# Patient Record
Sex: MALE | Race: WHITE | NOT HISPANIC OR LATINO
[De-identification: names, ages, dates, MRNs, and addresses within clinical notes are randomized per-mention and may not be internally consistent; named-entity substitution may affect disease eponyms.]

---

## 2017-01-12 ENCOUNTER — NON-APPOINTMENT (OUTPATIENT)
Age: 72
End: 2017-01-12

## 2017-01-12 ENCOUNTER — APPOINTMENT (OUTPATIENT)
Dept: CARDIOLOGY | Facility: CLINIC | Age: 72
End: 2017-01-12

## 2017-01-12 VITALS
WEIGHT: 190 LBS | HEIGHT: 72 IN | RESPIRATION RATE: 18 BRPM | HEART RATE: 63 BPM | BODY MASS INDEX: 25.73 KG/M2 | SYSTOLIC BLOOD PRESSURE: 124 MMHG | DIASTOLIC BLOOD PRESSURE: 75 MMHG | OXYGEN SATURATION: 99 %

## 2017-01-12 VITALS — SYSTOLIC BLOOD PRESSURE: 118 MMHG | DIASTOLIC BLOOD PRESSURE: 62 MMHG | HEART RATE: 68 BPM

## 2017-01-12 DIAGNOSIS — R00.1 BRADYCARDIA, UNSPECIFIED: ICD-10-CM

## 2017-01-12 DIAGNOSIS — Z81.8 FAMILY HISTORY OF OTHER MENTAL AND BEHAVIORAL DISORDERS: ICD-10-CM

## 2017-01-12 DIAGNOSIS — Z83.3 FAMILY HISTORY OF DIABETES MELLITUS: ICD-10-CM

## 2018-01-16 ENCOUNTER — APPOINTMENT (OUTPATIENT)
Dept: GASTROENTEROLOGY | Facility: CLINIC | Age: 73
End: 2018-01-16
Payer: MEDICARE

## 2018-01-16 VITALS
DIASTOLIC BLOOD PRESSURE: 80 MMHG | SYSTOLIC BLOOD PRESSURE: 120 MMHG | HEIGHT: 72 IN | BODY MASS INDEX: 25.73 KG/M2 | WEIGHT: 190 LBS | TEMPERATURE: 97.2 F

## 2018-01-16 VITALS
WEIGHT: 190 LBS | BODY MASS INDEX: 25.73 KG/M2 | HEIGHT: 72 IN | DIASTOLIC BLOOD PRESSURE: 80 MMHG | TEMPERATURE: 97.2 F | SYSTOLIC BLOOD PRESSURE: 120 MMHG

## 2018-01-16 DIAGNOSIS — Z80.42 FAMILY HISTORY OF MALIGNANT NEOPLASM OF PROSTATE: ICD-10-CM

## 2018-01-16 DIAGNOSIS — R10.30 LOWER ABDOMINAL PAIN, UNSPECIFIED: ICD-10-CM

## 2018-01-16 DIAGNOSIS — K46.9 UNSPECIFIED ABDOMINAL HERNIA W/OUT OBSTRUCTION OR GANGRENE: ICD-10-CM

## 2018-01-16 PROCEDURE — 99214 OFFICE O/P EST MOD 30 MIN: CPT

## 2018-01-16 RX ORDER — PAROXETINE HYDROCHLORIDE 20 MG/1
20 TABLET, FILM COATED ORAL
Refills: 0 | Status: DISCONTINUED | COMMUNITY
End: 2018-01-16

## 2018-01-16 RX ORDER — LORAZEPAM 1 MG/1
1 TABLET ORAL
Refills: 0 | Status: DISCONTINUED | COMMUNITY
End: 2018-01-16

## 2018-01-16 RX ORDER — QUETIAPINE 25 MG/1
25 TABLET, FILM COATED ORAL
Refills: 0 | Status: DISCONTINUED | COMMUNITY
End: 2018-01-16

## 2018-03-14 ENCOUNTER — APPOINTMENT (OUTPATIENT)
Dept: SURGERY | Facility: CLINIC | Age: 73
End: 2018-03-14

## 2018-03-15 ENCOUNTER — OUTPATIENT (OUTPATIENT)
Dept: OUTPATIENT SERVICES | Facility: HOSPITAL | Age: 73
LOS: 1 days | End: 2018-03-15
Payer: MEDICARE

## 2018-03-15 VITALS
HEIGHT: 72 IN | DIASTOLIC BLOOD PRESSURE: 69 MMHG | HEART RATE: 69 BPM | SYSTOLIC BLOOD PRESSURE: 115 MMHG | TEMPERATURE: 98 F | WEIGHT: 195.99 LBS

## 2018-03-15 DIAGNOSIS — F32.9 MAJOR DEPRESSIVE DISORDER, SINGLE EPISODE, UNSPECIFIED: Chronic | ICD-10-CM

## 2018-03-15 DIAGNOSIS — F41.9 ANXIETY DISORDER, UNSPECIFIED: Chronic | ICD-10-CM

## 2018-03-15 DIAGNOSIS — K40.90 UNILATERAL INGUINAL HERNIA, WITHOUT OBSTRUCTION OR GANGRENE, NOT SPECIFIED AS RECURRENT: ICD-10-CM

## 2018-03-15 DIAGNOSIS — Z98.890 OTHER SPECIFIED POSTPROCEDURAL STATES: Chronic | ICD-10-CM

## 2018-03-15 DIAGNOSIS — Z01.818 ENCOUNTER FOR OTHER PREPROCEDURAL EXAMINATION: ICD-10-CM

## 2018-03-15 DIAGNOSIS — K42.0 UMBILICAL HERNIA WITH OBSTRUCTION, WITHOUT GANGRENE: ICD-10-CM

## 2018-03-15 LAB
ANION GAP SERPL CALC-SCNC: 9 MMOL/L — SIGNIFICANT CHANGE UP (ref 5–17)
APPEARANCE UR: CLEAR — SIGNIFICANT CHANGE UP
BILIRUB UR-MCNC: ABNORMAL
BUN SERPL-MCNC: 25 MG/DL — HIGH (ref 7–23)
CALCIUM SERPL-MCNC: 8.6 MG/DL — SIGNIFICANT CHANGE UP (ref 8.5–10.1)
CHLORIDE SERPL-SCNC: 106 MMOL/L — SIGNIFICANT CHANGE UP (ref 96–108)
CO2 SERPL-SCNC: 26 MMOL/L — SIGNIFICANT CHANGE UP (ref 22–31)
COLOR SPEC: YELLOW — SIGNIFICANT CHANGE UP
CREAT SERPL-MCNC: 1.2 MG/DL — SIGNIFICANT CHANGE UP (ref 0.5–1.3)
DIFF PNL FLD: NEGATIVE — SIGNIFICANT CHANGE UP
GLUCOSE SERPL-MCNC: 83 MG/DL — SIGNIFICANT CHANGE UP (ref 70–99)
GLUCOSE UR QL: NEGATIVE — SIGNIFICANT CHANGE UP
HCT VFR BLD CALC: 45.8 % — SIGNIFICANT CHANGE UP (ref 39–50)
HGB BLD-MCNC: 15.7 G/DL — SIGNIFICANT CHANGE UP (ref 13–17)
KETONES UR-MCNC: NEGATIVE — SIGNIFICANT CHANGE UP
LEUKOCYTE ESTERASE UR-ACNC: NEGATIVE — SIGNIFICANT CHANGE UP
MCHC RBC-ENTMCNC: 31.2 PG — SIGNIFICANT CHANGE UP (ref 27–34)
MCHC RBC-ENTMCNC: 34.3 GM/DL — SIGNIFICANT CHANGE UP (ref 32–36)
MCV RBC AUTO: 90.9 FL — SIGNIFICANT CHANGE UP (ref 80–100)
NITRITE UR-MCNC: NEGATIVE — SIGNIFICANT CHANGE UP
PH UR: 5 — SIGNIFICANT CHANGE UP (ref 5–8)
PLATELET # BLD AUTO: 197 K/UL — SIGNIFICANT CHANGE UP (ref 150–400)
POTASSIUM SERPL-MCNC: 3.9 MMOL/L — SIGNIFICANT CHANGE UP (ref 3.5–5.3)
POTASSIUM SERPL-SCNC: 3.9 MMOL/L — SIGNIFICANT CHANGE UP (ref 3.5–5.3)
PROT UR-MCNC: NEGATIVE — SIGNIFICANT CHANGE UP
RBC # BLD: 5.04 M/UL — SIGNIFICANT CHANGE UP (ref 4.2–5.8)
RBC # FLD: 11.8 % — SIGNIFICANT CHANGE UP (ref 10.3–14.5)
SODIUM SERPL-SCNC: 141 MMOL/L — SIGNIFICANT CHANGE UP (ref 135–145)
SP GR SPEC: 1.02 — SIGNIFICANT CHANGE UP (ref 1.01–1.02)
UROBILINOGEN FLD QL: NEGATIVE — SIGNIFICANT CHANGE UP
WBC # BLD: 5.8 K/UL — SIGNIFICANT CHANGE UP (ref 3.8–10.5)
WBC # FLD AUTO: 5.8 K/UL — SIGNIFICANT CHANGE UP (ref 3.8–10.5)

## 2018-03-15 PROCEDURE — G0463: CPT

## 2018-03-15 PROCEDURE — 93005 ELECTROCARDIOGRAM TRACING: CPT

## 2018-03-15 PROCEDURE — 93010 ELECTROCARDIOGRAM REPORT: CPT | Mod: NC

## 2018-03-15 PROCEDURE — 81003 URINALYSIS AUTO W/O SCOPE: CPT

## 2018-03-15 PROCEDURE — 86900 BLOOD TYPING SEROLOGIC ABO: CPT

## 2018-03-15 PROCEDURE — 85027 COMPLETE CBC AUTOMATED: CPT

## 2018-03-15 PROCEDURE — 86850 RBC ANTIBODY SCREEN: CPT

## 2018-03-15 PROCEDURE — 86901 BLOOD TYPING SEROLOGIC RH(D): CPT

## 2018-03-15 PROCEDURE — 80048 BASIC METABOLIC PNL TOTAL CA: CPT

## 2018-03-15 NOTE — H&P PST ADULT - PMH
Anxiety    Bipolar disorder    BPH (benign prostatic hyperplasia)    Depression    Inguinal hernia of right side without obstruction or gangrene    Umbilical hernia, incarcerated

## 2018-03-15 NOTE — H&P PST ADULT - REASON FOR ADMISSION
"I have a hernia in my Right groin, another one in my umbilicus, and my other doctor is saying that I may have one in my Left groin"

## 2018-03-15 NOTE — H&P PST ADULT - NSANTHOSAYNRD_GEN_A_CORE
No. MAGDY screening performed.  STOP BANG Legend: 0-2 = LOW Risk; 3-4 = INTERMEDIATE Risk; 5-8 = HIGH Risk

## 2018-03-15 NOTE — H&P PST ADULT - HISTORY OF PRESENT ILLNESS
72 y.o. male with h/o depression and bipolar disorder c/o Right groin bulge and discomfort for two months. He was seen by Dr. NISSA Vo and was found to have a Right Inguinal hernia and an incarcerated umbilical hernia. He has been also told by another doctor that he may have a Left inguinal hernia. Patient is scheduled for Laparoscopic Repair Right Inguinal Hernia, Possible Left, Repair of Incarcerated Umbilical Hernia.

## 2018-03-15 NOTE — H&P PST ADULT - PROBLEM SELECTOR PLAN 1
Laparoscopic Repair Right Inguinal Hernia, Possible Left, Repair of Incarcerated Umbilical Hernia    Labs, EKG, MC. Pre op and Hibiclens instructions reviewed and given. Take routine am meds DOS with sip of water. Avoid NSAIDs and OTC supplements. Verbalized understanding Laparoscopic Repair Right Inguinal Hernia, Possible Left, Repair of Incarcerated Umbilical Hernia.    Labs, EKG, MC. Pre op and Hibiclens instructions reviewed and given. Take routine am meds DOS with sip of water. Avoid NSAIDs and OTC supplements. Verbalized understanding

## 2018-03-20 ENCOUNTER — OUTPATIENT (OUTPATIENT)
Dept: OUTPATIENT SERVICES | Facility: HOSPITAL | Age: 73
LOS: 1 days | End: 2018-03-20
Payer: MEDICARE

## 2018-03-20 ENCOUNTER — RESULT REVIEW (OUTPATIENT)
Age: 73
End: 2018-03-20

## 2018-03-20 VITALS
HEIGHT: 72 IN | WEIGHT: 195.99 LBS | TEMPERATURE: 98 F | OXYGEN SATURATION: 97 % | HEART RATE: 54 BPM | DIASTOLIC BLOOD PRESSURE: 72 MMHG | RESPIRATION RATE: 16 BRPM | SYSTOLIC BLOOD PRESSURE: 139 MMHG

## 2018-03-20 VITALS
RESPIRATION RATE: 16 BRPM | TEMPERATURE: 98 F | HEART RATE: 61 BPM | DIASTOLIC BLOOD PRESSURE: 70 MMHG | OXYGEN SATURATION: 97 % | SYSTOLIC BLOOD PRESSURE: 140 MMHG

## 2018-03-20 DIAGNOSIS — Z98.890 OTHER SPECIFIED POSTPROCEDURAL STATES: Chronic | ICD-10-CM

## 2018-03-20 DIAGNOSIS — K42.0 UMBILICAL HERNIA WITH OBSTRUCTION, WITHOUT GANGRENE: ICD-10-CM

## 2018-03-20 DIAGNOSIS — K40.90 UNILATERAL INGUINAL HERNIA, WITHOUT OBSTRUCTION OR GANGRENE, NOT SPECIFIED AS RECURRENT: ICD-10-CM

## 2018-03-20 PROCEDURE — 49650 LAP ING HERNIA REPAIR INIT: CPT | Mod: RT

## 2018-03-20 PROCEDURE — 49587: CPT

## 2018-03-20 PROCEDURE — C1781: CPT

## 2018-03-20 RX ORDER — CEFAZOLIN SODIUM 1 G
2000 VIAL (EA) INJECTION ONCE
Qty: 0 | Refills: 0 | Status: DISCONTINUED | OUTPATIENT
Start: 2018-03-20 | End: 2018-03-20

## 2018-03-20 RX ORDER — HYDROMORPHONE HYDROCHLORIDE 2 MG/ML
0.5 INJECTION INTRAMUSCULAR; INTRAVENOUS; SUBCUTANEOUS
Qty: 0 | Refills: 0 | Status: DISCONTINUED | OUTPATIENT
Start: 2018-03-20 | End: 2018-03-20

## 2018-03-20 RX ORDER — ONDANSETRON 8 MG/1
4 TABLET, FILM COATED ORAL ONCE
Qty: 0 | Refills: 0 | Status: DISCONTINUED | OUTPATIENT
Start: 2018-03-20 | End: 2018-03-20

## 2018-03-20 RX ORDER — OXYCODONE HYDROCHLORIDE 5 MG/1
5 TABLET ORAL EVERY 4 HOURS
Qty: 0 | Refills: 0 | Status: DISCONTINUED | OUTPATIENT
Start: 2018-03-20 | End: 2018-03-20

## 2018-03-20 RX ORDER — DOCUSATE SODIUM 100 MG
1 CAPSULE ORAL
Qty: 20 | Refills: 0
Start: 2018-03-20

## 2018-03-20 RX ORDER — SODIUM CHLORIDE 9 MG/ML
1000 INJECTION, SOLUTION INTRAVENOUS
Qty: 0 | Refills: 0 | Status: DISCONTINUED | OUTPATIENT
Start: 2018-03-20 | End: 2018-03-20

## 2018-03-20 RX ORDER — TAMSULOSIN HYDROCHLORIDE 0.4 MG/1
0.4 CAPSULE ORAL ONCE
Qty: 0 | Refills: 0 | Status: COMPLETED | OUTPATIENT
Start: 2018-03-20 | End: 2018-03-20

## 2018-03-20 RX ORDER — CEPHALEXIN 500 MG
1 CAPSULE ORAL
Qty: 2 | Refills: 0
Start: 2018-03-20

## 2018-03-20 RX ADMIN — TAMSULOSIN HYDROCHLORIDE 0.4 MILLIGRAM(S): 0.4 CAPSULE ORAL at 14:21

## 2018-03-20 RX ADMIN — SODIUM CHLORIDE 100 MILLILITER(S): 9 INJECTION, SOLUTION INTRAVENOUS at 13:21

## 2018-03-20 NOTE — ASU DISCHARGE PLAN (ADULT/PEDIATRIC). - NURSING INSTRUCTIONS
"3/2/17 Rec'd vm from client stating that she rec'd a letter from [employer] that they will no longer be paying her Medicare premium.  \"Am I still covered?\"  3/3/2017 Call placed to client, shared that her MA has lapsed, but still active with [employer]..  Explained that all of her paperwork has been turned into [facility], will need to wait for the paperwork to be processed.  CM to cont to follow   Yeni Savage RN, BC  Supervisor [facility]   [phone] (Fax)    " activity restrictions, signs and symptoms requiring immediate medical attention, infection prevention

## 2018-03-20 NOTE — ASU DISCHARGE PLAN (ADULT/PEDIATRIC). - MEDICATION SUMMARY - MEDICATIONS TO TAKE
I will START or STAY ON the medications listed below when I get home from the hospital:    oxyCODONE-acetaminophen 10 mg-325 mg oral tablet  -- 1 tab(s) by mouth every 6 hours, As Needed MDD:4   -- Caution federal law prohibits the transfer of this drug to any person other  than the person for whom it was prescribed.  May cause drowsiness.  Alcohol may intensify this effect.  Use care when operating dangerous machinery.  This prescription cannot be refilled.  This product contains acetaminophen.  Do not use  with any other product containing acetaminophen to prevent possible liver damage.  Using more of this medication than prescribed may cause serious breathing problems.    -- Indication: For pain control    TAMSULOSIN HCL 0.4 MG CAPSULE  -- Indication: For home med    LAMOTRIGINE 100 MG TABLET  -- Indication: For home med    MIRTAZAPINE 15 MG TABLET  -- Indication: For home med    Keflex 500 mg oral capsule  -- 1 cap(s) by mouth 2 times a day   -- Finish all this medication unless otherwise directed by prescriber.    -- Indication: For antibiotic    Colace 100 mg oral capsule  -- 1 cap(s) by mouth 3 times a day   -- Medication should be taken with plenty of water.    -- Indication: For constipation

## 2018-03-20 NOTE — BRIEF OPERATIVE NOTE - POST-OP DX
Incarcerated umbilical hernia  03/20/2018    Active  Zion Knott  Lipoma, spermatic cord  03/20/2018    Active  Zion Knott  Right inguinal hernia  03/20/2018    Active  Zion Knott

## 2018-03-20 NOTE — BRIEF OPERATIVE NOTE - PRE-OP DX
Incarcerated umbilical hernia  03/20/2018    Active  Zion Knott  Right inguinal hernia  03/20/2018    Active  Zion Knott

## 2018-03-20 NOTE — ASU DISCHARGE PLAN (ADULT/PEDIATRIC). - INSTRUCTIONS
take at least a glass of water with narcotic medication to prevent constipations, and take some food to prevent gastric irritation

## 2018-03-20 NOTE — ASU DISCHARGE PLAN (ADULT/PEDIATRIC). - NOTIFY
Fever greater than 101/Pain not relieved by Medications/Unable to Urinate Fever greater than 101/Bleeding that does not stop/Swelling that continues/Unable to Urinate/Pain not relieved by Medications/Inability to Tolerate Liquids or Foods/Persistent Nausea and Vomiting

## 2018-03-21 ENCOUNTER — TRANSCRIPTION ENCOUNTER (OUTPATIENT)
Age: 73
End: 2018-03-21

## 2018-03-21 LAB — SURGICAL PATHOLOGY FINAL REPORT - CH: SIGNIFICANT CHANGE UP

## 2021-05-06 PROBLEM — K42.0 UMBILICAL HERNIA WITH OBSTRUCTION, WITHOUT GANGRENE: Chronic | Status: ACTIVE | Noted: 2018-03-15

## 2021-05-06 PROBLEM — F32.9 MAJOR DEPRESSIVE DISORDER, SINGLE EPISODE, UNSPECIFIED: Chronic | Status: ACTIVE | Noted: 2018-03-15

## 2021-05-06 PROBLEM — F31.9 BIPOLAR DISORDER, UNSPECIFIED: Chronic | Status: ACTIVE | Noted: 2018-03-15

## 2021-05-06 PROBLEM — F41.9 ANXIETY DISORDER, UNSPECIFIED: Chronic | Status: ACTIVE | Noted: 2018-03-15

## 2021-05-06 PROBLEM — N40.0 BENIGN PROSTATIC HYPERPLASIA WITHOUT LOWER URINARY TRACT SYMPTOMS: Chronic | Status: ACTIVE | Noted: 2018-03-15

## 2021-05-08 ENCOUNTER — EMERGENCY (EMERGENCY)
Facility: HOSPITAL | Age: 76
LOS: 1 days | Discharge: HOME | End: 2021-05-08
Attending: EMERGENCY MEDICINE | Admitting: EMERGENCY MEDICINE
Payer: MEDICARE

## 2021-05-08 VITALS
HEART RATE: 55 BPM | RESPIRATION RATE: 18 BRPM | WEIGHT: 188.94 LBS | OXYGEN SATURATION: 100 % | DIASTOLIC BLOOD PRESSURE: 76 MMHG | SYSTOLIC BLOOD PRESSURE: 164 MMHG | HEIGHT: 75 IN | TEMPERATURE: 98 F

## 2021-05-08 DIAGNOSIS — Z98.890 OTHER SPECIFIED POSTPROCEDURAL STATES: Chronic | ICD-10-CM

## 2021-05-08 DIAGNOSIS — R33.9 RETENTION OF URINE, UNSPECIFIED: ICD-10-CM

## 2021-05-08 PROCEDURE — 99283 EMERGENCY DEPT VISIT LOW MDM: CPT | Mod: 25

## 2021-05-08 PROCEDURE — 87186 SC STD MICRODIL/AGAR DIL: CPT

## 2021-05-08 PROCEDURE — 81001 URINALYSIS AUTO W/SCOPE: CPT

## 2021-05-08 PROCEDURE — 51702 INSERT TEMP BLADDER CATH: CPT

## 2021-05-08 PROCEDURE — 87086 URINE CULTURE/COLONY COUNT: CPT

## 2021-05-08 PROCEDURE — 87077 CULTURE AEROBIC IDENTIFY: CPT

## 2021-05-08 NOTE — ED PROVIDER NOTE - OBJECTIVE STATEMENT
75yo male who presents with urinary retention. pt states he had the gerardo removed a week ago, and has not been compliant with his flomax, and today has had more difficulty urinating with pressure, no fever, chills, no vomiting no other complaints

## 2021-05-08 NOTE — ED PROVIDER NOTE - CARE PROVIDER_API CALL
Jean Torres  UROLOGY  [address], [address]  [place] [id number]  Phone: [phone]  Fax: [phone]  Follow Up Time:

## 2021-05-08 NOTE — ED PROVIDER NOTE - NSFOLLOWUPINSTRUCTIONS_ED_ALL_ED_FT
Urinary Retention in Men    WHAT YOU NEED TO KNOW:    Urinary retention is a condition that develops when your bladder does not empty completely when you urinate.     Male Reproductive System         DISCHARGE INSTRUCTIONS:    Medicines:   •Medicines can help decrease the size of your prostate, fight infection, and help you urinate more easily.      •Take your medicine as directed. Contact your healthcare provider if you think your medicine is not helping or if you have side effects. Tell him or her if you are allergic to any medicine. Keep a list of the medicines, vitamins, and herbs you take. Include the amounts, and when and why you take them. Bring the list or the pill bottles to follow-up visits. Carry your medicine list with you in case of an emergency.      Chandler catheter care: You may need a Chandler catheter for up to 2 weeks at home. Healthcare providers will give you a smaller leg bag to collect urine. Keep the bag below your waist. This will prevent urine from flowing back into your bladder and causing an infection or other problems. Also, keep the tube free of kinks so the urine will drain properly. Do not pull on the catheter. This can cause pain and bleeding, and may cause the catheter to come out. Ask your healthcare provider or urologist for more information on Chandler catheter care.    Urinate regularly: When your catheter is removed, do not let your bladder become too full before you urinate. Set regular times each day to urinate. Urinate as soon as you feel the need or at least every 3 hours while you are awake. Do not drink liquids before you go to bed. Urinate right before you go to bed.    Follow up with your healthcare provider or urologist as directed: Write down your questions so you remember to ask them during your visits.     Contact your healthcare provider or urologist if:   •You have a fever.      •You have pain when you urinate.      •You have blood in your urine.      •You have problems with your catheter.      •You have questions or concerns about your condition or care.      Return to the emergency department if:   •You have severe abdominal pain.      •You are breathing faster than usual.      •Your heartbeat is faster than usual.      •Your face, hands, feet, or ankles are swollen.

## 2021-05-08 NOTE — ED ADULT NURSE NOTE - OBJECTIVE STATEMENT
as per pt he was seen in a [place] ED for urinary retention; had urinary catheter for several days; was able to void. reports he has been having decreasing urine output that started today. reports good PO fluid intake during the day. denied pain, slight bladder distention. 16 fr catheter inserted by MD. clear yellow urine draining. pt tolerated insertion of catheter

## 2021-05-08 NOTE — ED PROCEDURE NOTE - CPROC ED TIME OUT STATEMENT1
SUBJECTIVE:    Patient is a 79y old Male who presents with a chief complaint of Dizziness (15 Apr 2019 06:06)    Currently admitted to medicine with the primary diagnosis of Chest pain     Today is hospital day 1d. This morning he is resting comfortably in bed and reports no new issues or overnight events.     PAST MEDICAL & SURGICAL HISTORY  Afib  BPH (benign prostatic hyperplasia)  Lymphoma: , s/p chemo&amp; radiation  Aortic valve stenosis: s/p replacement   CAD (coronary artery disease):  1 stent  H/O aortic valve replacement    SOCIAL HISTORY:  Negative for smoking/alcohol/drug use.     ALLERGIES:  No Known Allergies    MEDICATIONS:  STANDING MEDICATIONS  amiodarone    Tablet 200 milliGRAM(s) Oral daily  chlorhexidine 4% Liquid 1 Application(s) Topical <User Schedule>  clopidogrel Tablet 75 milliGRAM(s) Oral daily  doxazosin 1 milliGRAM(s) Oral at bedtime  lisinopril 20 milliGRAM(s) Oral at bedtime  metoprolol tartrate 25 milliGRAM(s) Oral two times a day  simvastatin 10 milliGRAM(s) Oral at bedtime    PRN MEDICATIONS    VITALS:   T(F): 96.3  HR: 56  BP: 132/68  RR: 18  SpO2: 99%    LABS:                        11.3   29.09 )-----------( 93       ( 15 Apr 2019 08:01 )             35.4     04-15    146  |  106  |  16  ----------------------------<  89  4.9   |  27  |  0.9    Ca    9.3      15 Apr 2019 08:01  Mg     2.1     04-15    TPro  6.7  /  Alb  4.3  /  TBili  0.5  /  DBili  x   /  AST  25  /  ALT  16  /  AlkPhos  75  04-14    PT/INR - ( 2019 14:54 )   PT: 32.40 sec;   INR: 2.85 ratio         PTT - ( 2019 14:54 )  PTT:41.7 sec  Urinalysis Basic - ( 2019 22:30 )    Color: Yellow / Appearance: Clear / S.025 / pH: x  Gluc: x / Ketone: Negative  / Bili: Negative / Urobili: 0.2 mg/dL   Blood: x / Protein: Trace mg/dL / Nitrite: Negative   Leuk Esterase: Negative / RBC: 3-5 /HPF / WBC x   Sq Epi: x / Non Sq Epi: x / Bacteria: x        Troponin T, Serum: <0.01 ng/mL (04-15-19 @ 08:01)  Troponin T, Serum: <0.01 ng/mL (19 @ 14:54)      CARDIAC MARKERS ( 15 Apr 2019 08:01 )  x     / <0.01 ng/mL / x     / x     / x      CARDIAC MARKERS ( 2019 14:54 )  x     / <0.01 ng/mL / x     / x     / x          RADIOLOGY:  CT Head No Cont (19)  No CT evidence for acute intracranial pathology.      Xray Chest 1 View AP/PA (19)  Stable left hemidiaphragm elevation. No focal consolidation.    PHYSICAL EXAM:  GEN: No acute distress  LUNGS: Clear to auscultation bilaterally   HEART: S1/S2 present. RRR.   ABD: Soft, non-tender, non-distended. Bowel sounds present  EXT: NC/NC/NE/2+PP/ZHU  NEURO: AAOX3 SUBJECTIVE:    Patient is a 79y old Male who presents with a chief complaint of Chest pain (15 Apr 2019 15:33)    Currently admitted to medicine with the primary diagnosis of BPPV (benign paroxysmal positional vertigo)     Today is hospital day 2d. This morning he is resting comfortably in bed and reports no new issues or overnight events.     PAST MEDICAL & SURGICAL HISTORY  Afib  BPH (benign prostatic hyperplasia)  Lymphoma: , s/p chemo&amp; radiation  Aortic valve stenosis: s/p replacement   CAD (coronary artery disease):  1 stent  H/O aortic valve replacement    SOCIAL HISTORY:  Negative for smoking/alcohol/drug use.     ALLERGIES:  No Known Allergies    MEDICATIONS:  STANDING MEDICATIONS  amiodarone    Tablet 200 milliGRAM(s) Oral daily  chlorhexidine 4% Liquid 1 Application(s) Topical <User Schedule>  clopidogrel Tablet 75 milliGRAM(s) Oral daily  doxazosin 1 milliGRAM(s) Oral at bedtime  lisinopril 20 milliGRAM(s) Oral at bedtime  metoprolol tartrate 25 milliGRAM(s) Oral two times a day  simvastatin 10 milliGRAM(s) Oral at bedtime    PRN MEDICATIONS    VITALS:   T(F): 96.5  HR: 58  BP: 124/58  RR: 18  SpO2: 97%    LABS:                        11.7   31.17 )-----------( 105      ( 15 Apr 2019 14:10 )             36.0     04-15    146  |  106  |  16  ----------------------------<  89  4.9   |  27  |  0.9    Ca    9.3      15 Apr 2019 08:01  Mg     2.1     -15    TPro  6.7  /  Alb  4.3  /  TBili  0.5  /  DBili  x   /  AST  25  /  ALT  16  /  AlkPhos  75  04-14    PT/INR - ( 2019 14:54 )   PT: 32.40 sec;   INR: 2.85 ratio         PTT - ( 2019 14:54 )  PTT:41.7 sec  Urinalysis Basic - ( 2019 22:30 )    Color: Yellow / Appearance: Clear / S.025 / pH: x  Gluc: x / Ketone: Negative  / Bili: Negative / Urobili: 0.2 mg/dL   Blood: x / Protein: Trace mg/dL / Nitrite: Negative   Leuk Esterase: Negative / RBC: 3-5 /HPF / WBC x   Sq Epi: x / Non Sq Epi: x / Bacteria: x      Culture - Urine (collected 2019 22:30)  Source: .Urine Clean Catch (Midstream)  Final Report (2019 00:23):    <10,000 CFU/mL Normal Urogenital [place]      CARDIAC MARKERS ( 15 Apr 2019 08:01 )  x     / <0.01 ng/mL / x     / x     / x      CARDIAC MARKERS ( 2019 14:54 )  x     / <0.01 ng/mL / x     / x     / x          RADIOLOGY:  Xray Chest 1 View AP/PA (19)  Stable left hemidiaphragm elevation. No focal consolidation.    CT head: no evidence of acute intracranial pathology    PHYSICAL EXAM:  GEN: No acute distress  LUNGS: Clear to auscultation bilaterally   HEART: S1/S2 present. RRR.   ABD: Soft, non-tender, non-distended. Bowel sounds present  EXT: NC/NC/NE/2+PP/ZHU  NEURO: AAOX3 “Patient's name, , procedure and correct site were confirmed during the [facility] Timeout.”

## 2021-05-08 NOTE — ED PROVIDER NOTE - PATIENT PORTAL LINK FT
You can access the FollowMyHealth Patient Portal offered by [facility] by registering at the following website: [email or web address]. By joining [employer]’s FollowMyHealth portal, you will also be able to view your health information using other applications (apps) compatible with our system.

## 2021-05-09 LAB
APPEARANCE UR: CLEAR — SIGNIFICANT CHANGE UP
BILIRUB UR-MCNC: NEGATIVE — SIGNIFICANT CHANGE UP
COLOR SPEC: YELLOW — SIGNIFICANT CHANGE UP
DIFF PNL FLD: ABNORMAL
GLUCOSE UR QL: NEGATIVE MG/DL — SIGNIFICANT CHANGE UP
KETONES UR-MCNC: NEGATIVE — SIGNIFICANT CHANGE UP
LEUKOCYTE ESTERASE UR-ACNC: NEGATIVE — SIGNIFICANT CHANGE UP
NITRITE UR-MCNC: NEGATIVE — SIGNIFICANT CHANGE UP
PH UR: 5 — SIGNIFICANT CHANGE UP (ref 5–8)
PROT UR-MCNC: NEGATIVE MG/DL — SIGNIFICANT CHANGE UP
SP GR SPEC: 1.02 — SIGNIFICANT CHANGE UP (ref 1.01–1.02)
UROBILINOGEN FLD QL: NEGATIVE MG/DL — SIGNIFICANT CHANGE UP

## 2021-05-09 NOTE — ED ADULT NURSE REASSESSMENT NOTE - NS ED NURSE REASSESS COMMENT FT1
after speaking with his wife, pt remembered he was having some burning upon urination. MD informed. urine specimens obtained and sent to lab. pt d/c to wife. both verbalized understanding of d/c instructions. left unit in NAD. ambulated unassisted. instructed to return to ED for fever, s/s of infections and care of urinary catheter. pt to follow up with urology

## 2021-05-11 NOTE — ED POST DISCHARGE NOTE - DETAILS
advised pt on + urine cx. will rx  bactrim. saw dr yates urologist yesterday advised pt is constipated causing urinary retention. currently on stool softener

## 2021-06-04 ENCOUNTER — APPOINTMENT (OUTPATIENT)
Dept: GASTROENTEROLOGY | Facility: CLINIC | Age: 76
End: 2021-06-04
Payer: MEDICARE

## 2021-06-04 VITALS
WEIGHT: 190 LBS | BODY MASS INDEX: 25.73 KG/M2 | SYSTOLIC BLOOD PRESSURE: 124 MMHG | TEMPERATURE: 97.1 F | OXYGEN SATURATION: 98 % | DIASTOLIC BLOOD PRESSURE: 73 MMHG | HEART RATE: 65 BPM | HEIGHT: 72 IN | RESPIRATION RATE: 17 BRPM

## 2021-06-04 DIAGNOSIS — Z23 ENCOUNTER FOR IMMUNIZATION: ICD-10-CM

## 2021-06-04 DIAGNOSIS — K59.03 DRUG INDUCED CONSTIPATION: ICD-10-CM

## 2021-06-04 DIAGNOSIS — Z86.59 PERSONAL HISTORY OF OTHER MENTAL AND BEHAVIORAL DISORDERS: ICD-10-CM

## 2021-06-04 PROCEDURE — 99214 OFFICE O/P EST MOD 30 MIN: CPT

## 2021-06-04 NOTE — ASSESSMENT
[FreeTextEntry1] : Patient has been treated for depression over the past year.  This seems to have caused him to have constipation and fecal impaction and urinary retention.  He takes Senokot and Colace but without complete relief of his symptoms.  He will be given Benefiber 1 tablespoon with breakfast and dinner and MiraLAX twice a week.\par FOBT will be sent to the lab.  I will see him in 3 months and assess him for colonoscopy at that time.

## 2021-06-04 NOTE — REVIEW OF SYSTEMS
[Feeling Tired] : feeling tired [As Noted in HPI] : as noted in HPI [Depression] : depression [Negative] : Endocrine

## 2021-06-04 NOTE — HISTORY OF PRESENT ILLNESS
[FreeTextEntry1] : Patient is a 76-year-old gentleman who had a hernia repair in 2018.  He has been depressed over the past year and was placed on Effexor, Abilify, and mirtazapine.  He has become more constipated and while he was in [place] had a fecal impaction and urinary retention 2 months ago.  He is currently seeing a urologist for his urinary retention.  Patient stopped taking his Flomax on a regular basis.\par His bowel movements more recently have been soft but he still has some incomplete evacuation of his stool.  He does take Senokot and Colace with some improvement of his bowel movements but they are still irregular.  His last colonoscopy was about 67 years ago and was normal.\par Patient denies seeing any blood or mucus in the stool.  He has no weight loss.  He denies any abdominal pain.

## 2021-06-10 LAB — HEMOCCULT STL QL IA: NEGATIVE

## 2021-09-10 ENCOUNTER — APPOINTMENT (OUTPATIENT)
Dept: GASTROENTEROLOGY | Facility: CLINIC | Age: 76
End: 2021-09-10

## 2022-05-11 ENCOUNTER — EMERGENCY (EMERGENCY)
Facility: HOSPITAL | Age: 77
LOS: 1 days | Discharge: ANOTHER HOSPITAL | End: 2022-05-11
Attending: STUDENT IN AN ORGANIZED HEALTH CARE EDUCATION/TRAINING PROGRAM | Admitting: STUDENT IN AN ORGANIZED HEALTH CARE EDUCATION/TRAINING PROGRAM
Payer: MEDICARE

## 2022-05-11 ENCOUNTER — APPOINTMENT (OUTPATIENT)
Dept: CARDIOLOGY | Facility: CLINIC | Age: 77
End: 2022-05-11

## 2022-05-11 ENCOUNTER — INPATIENT (INPATIENT)
Facility: HOSPITAL | Age: 77
LOS: 5 days | Discharge: HOME | DRG: 270 | End: 2022-05-17
Attending: INTERNAL MEDICINE | Admitting: INTERNAL MEDICINE
Payer: MEDICARE

## 2022-05-11 ENCOUNTER — NON-APPOINTMENT (OUTPATIENT)
Age: 77
End: 2022-05-11

## 2022-05-11 ENCOUNTER — APPOINTMENT (OUTPATIENT)
Dept: CARDIOLOGY | Facility: CLINIC | Age: 77
End: 2022-05-11
Payer: MEDICARE

## 2022-05-11 VITALS
HEART RATE: 84 BPM | SYSTOLIC BLOOD PRESSURE: 130 MMHG | DIASTOLIC BLOOD PRESSURE: 72 MMHG | OXYGEN SATURATION: 96 % | RESPIRATION RATE: 14 BRPM | TEMPERATURE: 98 F

## 2022-05-11 VITALS — HEIGHT: 72 IN | RESPIRATION RATE: 16 BRPM | HEART RATE: 114 BPM | WEIGHT: 189.6 LBS | OXYGEN SATURATION: 90 %

## 2022-05-11 VITALS
HEIGHT: 72 IN | RESPIRATION RATE: 95 BRPM | HEART RATE: 87 BPM | WEIGHT: 199.96 LBS | SYSTOLIC BLOOD PRESSURE: 129 MMHG | OXYGEN SATURATION: 95 % | DIASTOLIC BLOOD PRESSURE: 76 MMHG | TEMPERATURE: 98 F

## 2022-05-11 VITALS
OXYGEN SATURATION: 93 % | BODY MASS INDEX: 27.63 KG/M2 | TEMPERATURE: 97.5 F | SYSTOLIC BLOOD PRESSURE: 144 MMHG | DIASTOLIC BLOOD PRESSURE: 88 MMHG | HEIGHT: 72 IN | RESPIRATION RATE: 17 BRPM | WEIGHT: 204 LBS | HEART RATE: 93 BPM

## 2022-05-11 DIAGNOSIS — Z98.890 OTHER SPECIFIED POSTPROCEDURAL STATES: Chronic | ICD-10-CM

## 2022-05-11 DIAGNOSIS — R07.9 CHEST PAIN, UNSPECIFIED: ICD-10-CM

## 2022-05-11 DIAGNOSIS — R11.0 NAUSEA: ICD-10-CM

## 2022-05-11 DIAGNOSIS — R94.31 ABNORMAL ELECTROCARDIOGRAM [ECG] [EKG]: ICD-10-CM

## 2022-05-11 LAB
ALBUMIN SERPL ELPH-MCNC: 2.9 G/DL — LOW (ref 3.3–5)
ALBUMIN SERPL ELPH-MCNC: 3.2 G/DL — LOW (ref 3.3–5)
ALP SERPL-CCNC: 141 U/L — HIGH (ref 40–120)
ALP SERPL-CCNC: 158 U/L — HIGH (ref 40–120)
ALT FLD-CCNC: 53 U/L — HIGH (ref 10–45)
ALT FLD-CCNC: 86 U/L — HIGH (ref 10–45)
ANION GAP SERPL CALC-SCNC: 13 MMOL/L — SIGNIFICANT CHANGE UP (ref 5–17)
ANION GAP SERPL CALC-SCNC: 8 MMOL/L — SIGNIFICANT CHANGE UP (ref 5–17)
APTT BLD: 28.5 SEC — SIGNIFICANT CHANGE UP (ref 27.5–35.5)
APTT BLD: 30.6 SEC — SIGNIFICANT CHANGE UP (ref 27.5–35.5)
AST SERPL-CCNC: 39 U/L — SIGNIFICANT CHANGE UP (ref 10–40)
AST SERPL-CCNC: 55 U/L — HIGH (ref 10–40)
BASE EXCESS BLDMV CALC-SCNC: -0.8 MMOL/L — SIGNIFICANT CHANGE UP (ref -3–3)
BASE EXCESS BLDMV CALC-SCNC: -1.1 MMOL/L — SIGNIFICANT CHANGE UP (ref -3–3)
BASOPHILS # BLD AUTO: 0.03 K/UL — SIGNIFICANT CHANGE UP (ref 0–0.2)
BASOPHILS NFR BLD AUTO: 0.4 % — SIGNIFICANT CHANGE UP (ref 0–2)
BILIRUB SERPL-MCNC: 1.1 MG/DL — SIGNIFICANT CHANGE UP (ref 0.2–1.2)
BILIRUB SERPL-MCNC: 1.1 MG/DL — SIGNIFICANT CHANGE UP (ref 0.2–1.2)
BLD GP AB SCN SERPL QL: NEGATIVE — SIGNIFICANT CHANGE UP
BUN SERPL-MCNC: 21 MG/DL — SIGNIFICANT CHANGE UP (ref 7–23)
BUN SERPL-MCNC: 23 MG/DL — SIGNIFICANT CHANGE UP (ref 7–23)
CALCIUM SERPL-MCNC: 8.8 MG/DL — SIGNIFICANT CHANGE UP (ref 8.4–10.5)
CALCIUM SERPL-MCNC: 8.8 MG/DL — SIGNIFICANT CHANGE UP (ref 8.4–10.5)
CHLORIDE SERPL-SCNC: 103 MMOL/L — SIGNIFICANT CHANGE UP (ref 96–108)
CHLORIDE SERPL-SCNC: 106 MMOL/L — SIGNIFICANT CHANGE UP (ref 96–108)
CO2 BLDMV-SCNC: 24 MMOL/L — SIGNIFICANT CHANGE UP (ref 21–29)
CO2 BLDMV-SCNC: 25 MMOL/L — SIGNIFICANT CHANGE UP (ref 21–29)
CO2 SERPL-SCNC: 20 MMOL/L — LOW (ref 22–31)
CO2 SERPL-SCNC: 24 MMOL/L — SIGNIFICANT CHANGE UP (ref 22–31)
CREAT SERPL-MCNC: 0.95 MG/DL — SIGNIFICANT CHANGE UP (ref 0.5–1.3)
CREAT SERPL-MCNC: 1.24 MG/DL — SIGNIFICANT CHANGE UP (ref 0.5–1.3)
EGFR: 60 ML/MIN/1.73M2 — SIGNIFICANT CHANGE UP
EGFR: 82 ML/MIN/1.73M2 — SIGNIFICANT CHANGE UP
EOSINOPHIL # BLD AUTO: 0.07 K/UL — SIGNIFICANT CHANGE UP (ref 0–0.5)
EOSINOPHIL NFR BLD AUTO: 0.9 % — SIGNIFICANT CHANGE UP (ref 0–6)
GAS PNL BLDMV: SIGNIFICANT CHANGE UP
GAS PNL BLDMV: SIGNIFICANT CHANGE UP
GLUCOSE SERPL-MCNC: 105 MG/DL — HIGH (ref 70–99)
GLUCOSE SERPL-MCNC: 99 MG/DL — SIGNIFICANT CHANGE UP (ref 70–99)
HCO3 BLDMV-SCNC: 23 MMOL/L — SIGNIFICANT CHANGE UP (ref 20–28)
HCO3 BLDMV-SCNC: 24 MMOL/L — SIGNIFICANT CHANGE UP (ref 20–28)
HCT VFR BLD CALC: 40 % — SIGNIFICANT CHANGE UP (ref 39–50)
HCT VFR BLD CALC: 41.7 % — SIGNIFICANT CHANGE UP (ref 39–50)
HGB BLD-MCNC: 13 G/DL — SIGNIFICANT CHANGE UP (ref 13–17)
HGB BLD-MCNC: 13.9 G/DL — SIGNIFICANT CHANGE UP (ref 13–17)
HOROWITZ INDEX BLDMV+IHG-RTO: 36 — SIGNIFICANT CHANGE UP
HOROWITZ INDEX BLDMV+IHG-RTO: 36 — SIGNIFICANT CHANGE UP
IMM GRANULOCYTES NFR BLD AUTO: 0.4 % — SIGNIFICANT CHANGE UP (ref 0–1.5)
INR BLD: 1.22 RATIO — HIGH (ref 0.88–1.16)
LACTATE SERPL-SCNC: 1.2 MMOL/L — SIGNIFICANT CHANGE UP (ref 0.7–2)
LYMPHOCYTES # BLD AUTO: 1.1 K/UL — SIGNIFICANT CHANGE UP (ref 1–3.3)
LYMPHOCYTES # BLD AUTO: 13.6 % — SIGNIFICANT CHANGE UP (ref 13–44)
MAGNESIUM SERPL-MCNC: 2.1 MG/DL — SIGNIFICANT CHANGE UP (ref 1.6–2.6)
MCHC RBC-ENTMCNC: 30.7 PG — SIGNIFICANT CHANGE UP (ref 27–34)
MCHC RBC-ENTMCNC: 31.7 PG — SIGNIFICANT CHANGE UP (ref 27–34)
MCHC RBC-ENTMCNC: 32.5 GM/DL — SIGNIFICANT CHANGE UP (ref 32–36)
MCHC RBC-ENTMCNC: 33.3 GM/DL — SIGNIFICANT CHANGE UP (ref 32–36)
MCV RBC AUTO: 94.6 FL — SIGNIFICANT CHANGE UP (ref 80–100)
MCV RBC AUTO: 95 FL — SIGNIFICANT CHANGE UP (ref 80–100)
MONOCYTES # BLD AUTO: 1.03 K/UL — HIGH (ref 0–0.9)
MONOCYTES NFR BLD AUTO: 12.7 % — SIGNIFICANT CHANGE UP (ref 2–14)
NEUTROPHILS # BLD AUTO: 5.84 K/UL — SIGNIFICANT CHANGE UP (ref 1.8–7.4)
NEUTROPHILS NFR BLD AUTO: 72 % — SIGNIFICANT CHANGE UP (ref 43–77)
NRBC # BLD: 0 /100 WBCS — SIGNIFICANT CHANGE UP (ref 0–0)
NRBC # BLD: 0 /100 WBCS — SIGNIFICANT CHANGE UP (ref 0–0)
NT-PROBNP SERPL-SCNC: 4724 PG/ML — HIGH (ref 0–300)
O2 CT VFR BLD CALC: 34 MMHG — SIGNIFICANT CHANGE UP (ref 30–65)
O2 CT VFR BLD CALC: 40 MMHG — SIGNIFICANT CHANGE UP (ref 30–65)
PCO2 BLDMV: 34 MMHG — SIGNIFICANT CHANGE UP (ref 30–65)
PCO2 BLDMV: 37 MMHG — SIGNIFICANT CHANGE UP (ref 30–65)
PH BLDMV: 7.41 — SIGNIFICANT CHANGE UP (ref 7.32–7.45)
PH BLDMV: 7.43 — SIGNIFICANT CHANGE UP (ref 7.32–7.45)
PLATELET # BLD AUTO: 281 K/UL — SIGNIFICANT CHANGE UP (ref 150–400)
PLATELET # BLD AUTO: 297 K/UL — SIGNIFICANT CHANGE UP (ref 150–400)
POTASSIUM SERPL-MCNC: 4.3 MMOL/L — SIGNIFICANT CHANGE UP (ref 3.5–5.3)
POTASSIUM SERPL-MCNC: 4.6 MMOL/L — SIGNIFICANT CHANGE UP (ref 3.5–5.3)
POTASSIUM SERPL-SCNC: 4.3 MMOL/L — SIGNIFICANT CHANGE UP (ref 3.5–5.3)
POTASSIUM SERPL-SCNC: 4.6 MMOL/L — SIGNIFICANT CHANGE UP (ref 3.5–5.3)
PROT SERPL-MCNC: 6.1 G/DL — SIGNIFICANT CHANGE UP (ref 6–8.3)
PROT SERPL-MCNC: 6.9 G/DL — SIGNIFICANT CHANGE UP (ref 6–8.3)
PROTHROM AB SERPL-ACNC: 14.2 SEC — HIGH (ref 10.5–13.4)
RAPID RVP RESULT: SIGNIFICANT CHANGE UP
RBC # BLD: 4.23 M/UL — SIGNIFICANT CHANGE UP (ref 4.2–5.8)
RBC # BLD: 4.39 M/UL — SIGNIFICANT CHANGE UP (ref 4.2–5.8)
RBC # FLD: 14.4 % — SIGNIFICANT CHANGE UP (ref 10.3–14.5)
RBC # FLD: 14.5 % — SIGNIFICANT CHANGE UP (ref 10.3–14.5)
RH IG SCN BLD-IMP: POSITIVE — SIGNIFICANT CHANGE UP
SAO2 % BLDMV: 54.3 — LOW (ref 60–90)
SAO2 % BLDMV: 64.6 — SIGNIFICANT CHANGE UP (ref 60–90)
SARS-COV-2 RNA SPEC QL NAA+PROBE: SIGNIFICANT CHANGE UP
SODIUM SERPL-SCNC: 136 MMOL/L — SIGNIFICANT CHANGE UP (ref 135–145)
SODIUM SERPL-SCNC: 138 MMOL/L — SIGNIFICANT CHANGE UP (ref 135–145)
TROPONIN I, HIGH SENSITIVITY RESULT: 248.1 NG/L — HIGH
TROPONIN I, HIGH SENSITIVITY RESULT: 263.9 NG/L — HIGH
WBC # BLD: 6.59 K/UL — SIGNIFICANT CHANGE UP (ref 3.8–10.5)
WBC # BLD: 8.1 K/UL — SIGNIFICANT CHANGE UP (ref 3.8–10.5)
WBC # FLD AUTO: 6.59 K/UL — SIGNIFICANT CHANGE UP (ref 3.8–10.5)
WBC # FLD AUTO: 8.1 K/UL — SIGNIFICANT CHANGE UP (ref 3.8–10.5)

## 2022-05-11 PROCEDURE — 85610 PROTHROMBIN TIME: CPT

## 2022-05-11 PROCEDURE — 71045 X-RAY EXAM CHEST 1 VIEW: CPT

## 2022-05-11 PROCEDURE — 83735 ASSAY OF MAGNESIUM: CPT

## 2022-05-11 PROCEDURE — 71045 X-RAY EXAM CHEST 1 VIEW: CPT | Mod: 26

## 2022-05-11 PROCEDURE — 0225U NFCT DS DNA&RNA 21 SARSCOV2: CPT

## 2022-05-11 PROCEDURE — 36415 COLL VENOUS BLD VENIPUNCTURE: CPT

## 2022-05-11 PROCEDURE — 85025 COMPLETE CBC W/AUTO DIFF WBC: CPT

## 2022-05-11 PROCEDURE — 99292 CRITICAL CARE ADDL 30 MIN: CPT

## 2022-05-11 PROCEDURE — 96375 TX/PRO/DX INJ NEW DRUG ADDON: CPT

## 2022-05-11 PROCEDURE — 84484 ASSAY OF TROPONIN QUANT: CPT

## 2022-05-11 PROCEDURE — 99291 CRITICAL CARE FIRST HOUR: CPT | Mod: 25

## 2022-05-11 PROCEDURE — 85730 THROMBOPLASTIN TIME PARTIAL: CPT

## 2022-05-11 PROCEDURE — 99285 EMERGENCY DEPT VISIT HI MDM: CPT | Mod: 25

## 2022-05-11 PROCEDURE — 80053 COMPREHEN METABOLIC PANEL: CPT

## 2022-05-11 PROCEDURE — 93460 R&L HRT ART/VENTRICLE ANGIO: CPT | Mod: 26

## 2022-05-11 PROCEDURE — 93000 ELECTROCARDIOGRAM COMPLETE: CPT

## 2022-05-11 PROCEDURE — 96374 THER/PROPH/DIAG INJ IV PUSH: CPT

## 2022-05-11 PROCEDURE — 93010 ELECTROCARDIOGRAM REPORT: CPT | Mod: 76

## 2022-05-11 PROCEDURE — 93005 ELECTROCARDIOGRAM TRACING: CPT

## 2022-05-11 PROCEDURE — 33967 INSERT I-AORT PERCUT DEVICE: CPT

## 2022-05-11 PROCEDURE — 99205 OFFICE O/P NEW HI 60 MIN: CPT

## 2022-05-11 PROCEDURE — 99291 CRITICAL CARE FIRST HOUR: CPT

## 2022-05-11 PROCEDURE — 93010 ELECTROCARDIOGRAM REPORT: CPT | Mod: 59

## 2022-05-11 PROCEDURE — 83880 ASSAY OF NATRIURETIC PEPTIDE: CPT

## 2022-05-11 RX ORDER — HYDRALAZINE HCL 50 MG
10 TABLET ORAL THREE TIMES A DAY
Refills: 0 | Status: DISCONTINUED | OUTPATIENT
Start: 2022-05-11 | End: 2022-05-12

## 2022-05-11 RX ORDER — CHLORHEXIDINE GLUCONATE 213 G/1000ML
1 SOLUTION TOPICAL
Refills: 0 | Status: DISCONTINUED | OUTPATIENT
Start: 2022-05-11 | End: 2022-05-17

## 2022-05-11 RX ORDER — ASPIRIN/CALCIUM CARB/MAGNESIUM 324 MG
324 TABLET ORAL ONCE
Refills: 0 | Status: COMPLETED | OUTPATIENT
Start: 2022-05-11 | End: 2022-05-11

## 2022-05-11 RX ORDER — HEPARIN SODIUM 5000 [USP'U]/ML
4000 INJECTION INTRAVENOUS; SUBCUTANEOUS ONCE
Refills: 0 | Status: COMPLETED | OUTPATIENT
Start: 2022-05-11 | End: 2022-05-11

## 2022-05-11 RX ORDER — HEPARIN SODIUM 5000 [USP'U]/ML
4000 INJECTION INTRAVENOUS; SUBCUTANEOUS EVERY 6 HOURS
Refills: 0 | Status: DISCONTINUED | OUTPATIENT
Start: 2022-05-11 | End: 2022-05-14

## 2022-05-11 RX ORDER — HEPARIN SODIUM 5000 [USP'U]/ML
1700 INJECTION INTRAVENOUS; SUBCUTANEOUS
Qty: 25000 | Refills: 0 | Status: DISCONTINUED | OUTPATIENT
Start: 2022-05-11 | End: 2022-05-12

## 2022-05-11 RX ORDER — FUROSEMIDE 40 MG
40 TABLET ORAL ONCE
Refills: 0 | Status: COMPLETED | OUTPATIENT
Start: 2022-05-11 | End: 2022-05-11

## 2022-05-11 RX ORDER — CLOPIDOGREL BISULFATE 75 MG/1
75 TABLET, FILM COATED ORAL DAILY
Refills: 0 | Status: DISCONTINUED | OUTPATIENT
Start: 2022-05-12 | End: 2022-05-17

## 2022-05-11 RX ORDER — ATORVASTATIN CALCIUM 80 MG/1
80 TABLET, FILM COATED ORAL AT BEDTIME
Refills: 0 | Status: DISCONTINUED | OUTPATIENT
Start: 2022-05-11 | End: 2022-05-16

## 2022-05-11 RX ORDER — SODIUM BICARBONATE 1 MEQ/ML
50 SYRINGE (ML) INTRAVENOUS
Refills: 0 | Status: DISCONTINUED | OUTPATIENT
Start: 2022-05-11 | End: 2022-05-11

## 2022-05-11 RX ORDER — CLOPIDOGREL BISULFATE 75 MG/1
600 TABLET, FILM COATED ORAL ONCE
Refills: 0 | Status: COMPLETED | OUTPATIENT
Start: 2022-05-11 | End: 2022-05-11

## 2022-05-11 RX ORDER — ASPIRIN/CALCIUM CARB/MAGNESIUM 324 MG
81 TABLET ORAL DAILY
Refills: 0 | Status: DISCONTINUED | OUTPATIENT
Start: 2022-05-11 | End: 2022-05-17

## 2022-05-11 RX ORDER — HEPARIN SODIUM 5000 [USP'U]/ML
INJECTION INTRAVENOUS; SUBCUTANEOUS
Qty: 25000 | Refills: 0 | Status: DISCONTINUED | OUTPATIENT
Start: 2022-05-11 | End: 2022-05-14

## 2022-05-11 RX ADMIN — ATORVASTATIN CALCIUM 80 MILLIGRAM(S): 80 TABLET, FILM COATED ORAL at 21:48

## 2022-05-11 RX ADMIN — HEPARIN SODIUM 1000 UNIT(S)/HR: 5000 INJECTION INTRAVENOUS; SUBCUTANEOUS at 13:39

## 2022-05-11 RX ADMIN — CLOPIDOGREL BISULFATE 600 MILLIGRAM(S): 75 TABLET, FILM COATED ORAL at 13:38

## 2022-05-11 RX ADMIN — Medication 324 MILLIGRAM(S): at 12:32

## 2022-05-11 RX ADMIN — HEPARIN SODIUM 4000 UNIT(S): 5000 INJECTION INTRAVENOUS; SUBCUTANEOUS at 13:39

## 2022-05-11 RX ADMIN — Medication 40 MILLIGRAM(S): at 15:04

## 2022-05-11 RX ADMIN — Medication 10 MILLIGRAM(S): at 21:48

## 2022-05-11 RX ADMIN — HEPARIN SODIUM 17 UNIT(S)/HR: 5000 INJECTION INTRAVENOUS; SUBCUTANEOUS at 20:00

## 2022-05-11 NOTE — REVIEW OF SYSTEMS
[SOB] : shortness of breath [Chest Discomfort] : chest discomfort [Negative] : Heme/Lymph [FreeTextEntry5] : See HPI

## 2022-05-11 NOTE — ED ADULT NURSE NOTE - NSICDXFAMILYHX_GEN_ALL_CORE_FT
FAMILY HISTORY:  Mother  Still living? No  Family history of depression, Age at diagnosis: Age Unknown

## 2022-05-11 NOTE — REASON FOR VISIT
[Symptom and Test Evaluation] : symptom and test evaluation [CV Risk Factors and Non-Cardiac Disease] : CV risk factors and non-cardiac disease [Coronary Artery Disease] : coronary artery disease [FreeTextEntry3] : Dr. Lopez [FreeTextEntry1] : John recently reports depression and  nausea. He  is undergoing psychiatric evaluations on medical therapy. He feels like he has slowed down and feels tired and short of breath. He usually swims 3 times per week. He has anxiety and depression. His wife has noted a distinct difference recently. His brother is undergoing cardiac stents. He underwent vaccination November 20 2021 with Moderna. He suffered a COVID illness with the omicron variant on Christmas eve. The  PCR was positive after rapid was negative. He is being seen by GI service.   He comes today for clarification of  his overall cardiovascular risk. He was advised to undergo a complete cardiac evaluation.

## 2022-05-11 NOTE — ED ADULT NURSE NOTE - OBJECTIVE STATEMENT
Pt presents to ED with c/o SOB x several weeks.  SOB occurs with activity.  Pt states that "at first I thought it was my anxiety and I went to my doctor and was prescribed Clonazepam." Pt states that SOB persists.  Denies any chest pain, palpitations. EKG completed.  Pt placed on continuous cardiac monitoring.

## 2022-05-11 NOTE — ED ADULT NURSE NOTE - NSICDXPASTMEDICALHX_GEN_ALL_CORE_FT
PAST MEDICAL HISTORY:  Anxiety     Bipolar disorder     BPH (benign prostatic hyperplasia)     Depression     Inguinal hernia of right side without obstruction or gangrene     Umbilical hernia, incarcerated

## 2022-05-11 NOTE — PATIENT PROFILE ADULT - FALL HARM RISK - HARM RISK INTERVENTIONS

## 2022-05-11 NOTE — H&P ADULT - NSHPREVIEWOFSYSTEMS_GEN_ALL_CORE
General: denies fever, chills  HENT: denies nasal congestion, rhinorrhea  Eyes: denies visual changes, blurred vision  CV: intermittent palpitations  Resp: denies difficulty breathing, cough  Abdominal: intermittent nausea  : denies urinary pain or discharge  MSK: denies muscle aches, leg swelling  Neuro: denies headaches, numbness, tingling

## 2022-05-11 NOTE — ED PROVIDER NOTE - ST/T WAVE
v2 v3 ST elevations with reciprocal changes II III avf v2 v3 ST elevations with reciprocal changes I II

## 2022-05-11 NOTE — ED PROVIDER NOTE - PROGRESS NOTE DETAILS
Quita KAMINSKI:  I, Angle Devlin DO,  performed the initial face to face bedside interview with this patient regarding history of present illness, review of symptoms and relevant past medical, social and family history.  I completed an independent physical examination.  I was the initial provider who evaluated this patient.   I personally saw the patient and performed a substantive portion of the visit including all aspects of the medical decision making.  I have signed out the follow up of any pending tests (i.e. labs, radiological studies) to the ACP.  I have communicated the patient’s plan of care and disposition with the ACP. Quita KAMINSKI: patient was sent in by Dr. Walden for abnormal EKG; spoke with Dr. Walden who requested 2 troponins; I spoke to Dr. Walden who agreed patient needs to be transferred to [facility]; patient with concern of recent STEMI with q waves and nausea, dyspnea on exertion x 2 weeks; case discussed with Dr. Helms at [facility]- aspirin, heparin bolus and gtt; plavix ordered as instructed; to be transferred to [facility].

## 2022-05-11 NOTE — H&P ADULT - ATTENDING COMMENTS
Admitted for unstable angina, not revascularized, complicated by shock requiring an IABP  A+Ox3  Coronary angiogram with RCA and LAD disease; PCI deferred due to high filling pressures and cardiogenic shock  IABP was placed, still in cardiogenic shock with CI 1.9 - maintain IABP  Start afterload reduction with Bidil, may need Nipride  Once he is compensated he may need a viability study prior to revascularization - will discuss with Interventional  DAPT with ASA, Plavix  SpO2 mid 90s on nasal cannula - wean to room air  Normal renal function, elevated filling pressures - diurese for 1L overall negative  H/H acceptable - start Heparin drip for IABP  Afebrile, no antibiotics  Sugars controlled  IABP 5/11 Admitted for unstable angina, not revascularized, complicated by shock requiring an IABP  A+Ox3  Coronary angiogram with RCA and LAD disease; PCI deferred due to high filling pressures and cardiogenic shock  IABP was placed, still in cardiogenic shock with CI 1.9 - maintain IABP  Start afterload reduction with Bidil, may need Nipride  Once he is compensated he may need a viability study prior to revascularization - will discuss with Interventional  DAPT with ASA, Plavix  Check TTE  SpO2 mid 90s on nasal cannula - wean to room air  Normal renal function, elevated filling pressures - diurese for 1L overall negative  H/H acceptable - start Heparin drip for IABP  Afebrile, no antibiotics  Sugars controlled  IABP 5/11

## 2022-05-11 NOTE — H&P ADULT - ASSESSMENT
Assessment:    76 yo M w/ history of HLD, bipolar disorder, anxiety and BPH coming in with exertional chest pain and dyspnea found to have 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    Plan:    #Neuro:  - AOX3    #Respiratory      #C     Assessment:    78 yo M w/ history of HLD, bipolar disorder, anxiety and BPH coming in with exertional chest pain and dyspnea found to have 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    Plan:    #Neuro:  - AOX3    #Respiratory  - Breathing comfortably on RA      #CV    IABP right femoral  Right swanz    #GI    #endo    #Heme    #ID   Assessment:    76 yo M w/ history of HLD, bipolar disorder, anxiety and BPH coming in with exertional chest pain and dyspnea found to have 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    Plan:    #Neuro:  - AOX3    #Respiratory  - Breathing comfortably on RA  - Goal saturation >94%    #CV  - RCA OCT w/ 70% stenosis of LA  - s/p IABP right femoral and right swanz  - Heparin GTT    #GI  - DASH diet  - no active issues    #Endo  - Will follow up TSH and DM    #Heme  - Heparin GTT  - Will trend H&H    #ID  - Afebrile; will monitor fever curve

## 2022-05-11 NOTE — CARDIOLOGY SUMMARY
[de-identified] : 5/11/2022 normal sinus rhythm anteroseptal pattern anterior ST segment elevation \par

## 2022-05-11 NOTE — PROGRESS NOTE ADULT - SUBJECTIVE AND OBJECTIVE BOX
ELYSE MALAVE  MRN-[id number]  Patient is a 77y old  Male who presents with a chief complaint of Cardiogenic Shock (11 May 2022 17:36)    HPI:  76 yo M w/ history of bipolar disorder, anxiety and BPH coming in as a transfer from [facility] for further cardiac intervention. Patient presented to the hospital for intermittent SOB on exertion for two weeks associated with nausea, palpitations and chest pressure that self resolved. Patient typically active; swimming 3x a week at baseline but notes worsened fatigue while swimming. Found to have abnormal EKG concerning for impending occlusion and was transferred for catheterization.   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  (11 May 2022 17:36)      Surgery/Hospital course:    Overnight events:    REVIEW OF SYSTEMS:    CONSTITUTIONAL: No weakness, fevers or chills  EYES/ENT: No visual changes;  No vertigo or throat pain   NECK: No pain or stiffness  RESPIRATORY: No cough, wheezing, hemoptysis; No shortness of breath  CARDIOVASCULAR: No chest pain or palpitations  GASTROINTESTINAL: No abdominal or epigastric pain. No nausea, vomiting, or hematemesis; No diarrhea or constipation. No melena or hematochezia.  GENITOURINARY: No dysuria, frequency or hematuria  NEUROLOGICAL: No numbness or weakness  SKIN: No itching, rashes      Physical Exam:  Vital Signs Last 24 Hrs  T(C): 36 (11 May 2022 17:00), Max: 36 (11 May 2022 17:00)  T(F): 96.8 (11 May 2022 17:00), Max: 96.8 (11 May 2022 17:00)  HR: 74 (11 May 2022 18:30) (69 - 137)  BP: 135/75 (11 May 2022 17:00) (135/75 - 135/75)  BP(mean): 99 (11 May 2022 17:00) (99 - 99)  RR: 24 (11 May 2022 18:30) (16 - 26)  SpO2: 96% (11 May 2022 18:30) (90% - 97%)  Physical Exam:   Constitutional: NAD, well-groomed, well-developed  HEENT: PERRLA, EOMI, no drainage or redness  Neck: supple,  No JVD  Respiratory: Breath Sounds equal & clear bilaterally to auscultation, no rales/rhonchi/wheezing, no accessory muscle use noted  Cardiovascular: Regular rate, regular rhythm, normal S1, S2; no murmurs or rub  Gastrointestinal: Soft, non-tender, non distended, + bowel sounds  Extremities: GARCIA x 4, no peripheral edema, no cyanosis, no clubbing   Neurological: A+O x 3; speech clear and intact; no sensory, motor  deficits, normal reflexes  Skin: warm, dry, well perfused  Incisions:    ============================I/O===========================   I&O's Detail    11 May 2022 07:01  -  11 May 2022 20:23  --------------------------------------------------------  IN:    Oral Fluid: 240 mL  Total IN: 240 mL    OUT:    Voided (mL): 950 mL  Total OUT: 950 mL    Total NET: -710 mL        ============================ LABS =========================                        13.0   6.59  )-----------( 281      ( 11 May 2022 18:29 )             40.0     05-11    136  |  103  |  21  ----------------------------<  99  4.3   |  20<L>  |  0.95    Ca    8.8      11 May 2022 18:29    TPro  6.1  /  Alb  3.2<L>  /  TBili  1.1  /  DBili  x   /  AST  39  /  ALT  53<H>  /  AlkPhos  141<H>  05-11    LIVER FUNCTIONS - ( 11 May 2022 18:29 )  Alb: 3.2 g/dL / Pro: 6.1 g/dL / ALK PHOS: 141 U/L / ALT: 53 U/L / AST: 39 U/L / GGT: x           PTT - ( 11 May 2022 18:29 )  PTT:28.5 sec      ======================Micro/Rad/Cardio=================  Culture: Reviewed   CXR: Reviewed  Echo:Reviewed  ======================================================  PAST MEDICAL & SURGICAL HISTORY:  Depression      Constipation      Urinary retention      History of hernia repair        ====================ASSESSMENT ==============  CNS:  RES:  CVS:  Hem:  Sonny:  GI:  Endo:  ID:  Skin  Plan:  ====================== NEUROLOGY=====================    ==================== RESPIRATORY======================  Mechanical Ventilation:      ====================CARDIOVASCULAR==================    ===================HEMATOLOGIC/ONC ===================  aspirin enteric coated 81 milliGRAM(s) Oral daily  heparin  Infusion 1700 Unit(s)/Hr (17 mL/Hr) IV Continuous <Continuous>    ===================== RENAL =========================  Continue monitoring urine output    ==================== GASTROINTESTINAL===================    =======================    ENDOCRINE  =====================  atorvastatin 80 milliGRAM(s) Oral at bedtime    ========================INFECTIOUS DISEASE================      ========================PROPHYLACTIC MEASURE================  DVT  GI Protonix  Graft patency   Beta blocker      Patient requires continuous monitoring with bedside rhythm monitoring, arterial line, pulse oximetry, ventilator monitoring and intermittent blood gas analysis.  Care plan discussed with ICU care team.  Patient remained critical; required more than usual post op care; I have spent 35 minutes providing non-routine post op care, revaluated multiple times during the day.         ELYSE MALAVE  MRN-[id number]  Patient is a 77y old  Male who presents with a chief complaint of Cardiogenic Shock (11 May 2022 17:36)    HPI:  76 yo M w/ history of bipolar disorder, anxiety and BPH coming in as a transfer from [facility] for further cardiac intervention. Patient presented to the hospital for intermittent SOB on exertion for two weeks associated with nausea, palpitations and chest pressure that self resolved. Patient typically active; swimming 3x a week at baseline but notes worsened fatigue while swimming. Found to have abnormal EKG concerning for impending occlusion and was transferred for catheterization.   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  (11 May 2022 17:36)      24 hr events: s/p LHC w/  RCA, 70% LAD, IABP placed for CI 1.7    REVIEW OF SYSTEMS:    CONSTITUTIONAL: No weakness, fevers or chills  EYES/ENT: No visual changes;  No vertigo or throat pain   NECK: No pain or stiffness  RESPIRATORY: No cough, wheezing, hemoptysis; No shortness of breath  CARDIOVASCULAR: No chest pain or palpitations  GASTROINTESTINAL: No abdominal or epigastric pain. No nausea, vomiting, or hematemesis; No diarrhea or constipation. No melena or hematochezia.  GENITOURINARY: No dysuria, frequency or hematuria  NEUROLOGICAL: No numbness or weakness  SKIN: No itching, rashes      Physical Exam:  Vital Signs Last 24 Hrs  T(C): 36 (11 May 2022 17:00), Max: 36 (11 May 2022 17:00)  T(F): 96.8 (11 May 2022 17:00), Max: 96.8 (11 May 2022 17:00)  HR: 74 (11 May 2022 18:30) (69 - 137)  BP: 135/75 (11 May 2022 17:00) (135/75 - 135/75)  BP(mean): 99 (11 May 2022 17:00) (99 - 99)  RR: 24 (11 May 2022 18:30) (16 - 26)  SpO2: 96% (11 May 2022 18:30) (90% - 97%)      ============================I/O===========================   I&O's Detail    11 May 2022 07:01  -  11 May 2022 20:23  --------------------------------------------------------  IN:    Oral Fluid: 240 mL  Total IN: 240 mL    OUT:    Voided (mL): 950 mL  Total OUT: 950 mL    Total NET: -710 mL        ============================ LABS =========================                        13.0   6.59  )-----------( 281      ( 11 May 2022 18:29 )             40.0     05-11    136  |  103  |  21  ----------------------------<  99  4.3   |  20<L>  |  0.95    Ca    8.8      11 May 2022 18:29    TPro  6.1  /  Alb  3.2<L>  /  TBili  1.1  /  DBili  x   /  AST  39  /  ALT  53<H>  /  AlkPhos  141<H>  05-11    LIVER FUNCTIONS - ( 11 May 2022 18:29 )  Alb: 3.2 g/dL / Pro: 6.1 g/dL / ALK PHOS: 141 U/L / ALT: 53 U/L / AST: 39 U/L / GGT: x           PTT - ( 11 May 2022 18:29 )  PTT:28.5 sec      ======================Micro/Rad/Cardio=================  Culture: Reviewed   CXR: Reviewed  Echo:Reviewed  ======================================================  PAST MEDICAL & SURGICAL HISTORY:  Depression      Constipation      Urinary retention      History of hernia repair        ====================ASSESSMENT ==============  76 yo M w/ history of HLD, bipolar disorder, anxiety and BPH coming in with exertional chest pain and dyspnea found to have 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    Plan:  ====================== NEUROLOGY=====================  No active issues  - AAOx3     ==================== RESPIRATORY======================  No active issues  - comfortable on RA     ====================CARDIOVASCULAR==================  CAD  - RCA OCT w/ 70% stenosis of LA  - s/p IABP right femoral and right swanz  - Heparin GTT  - ASA and Plavix loaded  - continue with high intensity statin  - hydral for AL reduction     ===================HEMATOLOGIC/ONC ===================  No active issues  - continue to monitor H/H    aspirin enteric coated 81 milliGRAM(s) Oral daily  heparin  Infusion 1700 Unit(s)/Hr (17 mL/Hr) IV Continuous <Continuous>    ===================== RENAL =========================  No active issues  - continue to monitor SCr  - continue to monitor UO    ==================== GASTROINTESTINAL===================  No active issues  - DASH diet  - no active issues  =======================    ENDOCRINE  =====================  No active issues  - f/u A1C  - f/u TSH      atorvastatin 80 milliGRAM(s) Oral at bedtime    ========================INFECTIOUS DISEASE================  - Afebrile; will monitor fever curve      Patient requires continuous monitoring with bedside rhythm monitoring, pulse ox monitoring, and intermittent blood gas analysis. Care plan discussed with ICU care team. Patient remained critical and at risk for life threatening decompensation.  Patient seen, examined and plan discussed with CCU team during rounds.     I have personally provided 35 minutes of critical care time excluding time spent on separate procedures, in addition to initial critical care time provided by the CICU Attending, Dr. Mohamud     ELYSE MALAVE  MRN-[id number]  Patient is a 77y old  Male who presents with a chief complaint of Cardiogenic Shock (11 May 2022 17:36)    HPI:  76 yo M w/ history of bipolar disorder, anxiety and BPH [facility]. Patient presented to the hospital for intermittent SOB on exertion for two weeks associated with nausea, palpitations and chest pressure that self resolved. Patient typically active; swimming 3x a week at baseline but notes worsened fatigue while swimming. Found to have abnormal EKG concerning for impending occlusion and was transferred for catheterization.   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  (11 May 2022 17:36)      24 hr events: s/p LHC w/  RCA, 70% LAD, IABP placed for CI 1.7    REVIEW OF SYSTEMS:    CONSTITUTIONAL: No weakness, fevers or chills  EYES/ENT: No visual changes;  No vertigo or throat pain   NECK: No pain or stiffness  RESPIRATORY: No cough, wheezing, hemoptysis; No shortness of breath  CARDIOVASCULAR: No chest pain or palpitations  GASTROINTESTINAL: No abdominal or epigastric pain. No nausea, vomiting, or hematemesis; No diarrhea or constipation. No melena or hematochezia.  GENITOURINARY: No dysuria, frequency or hematuria  NEUROLOGICAL: No numbness or weakness  SKIN: No itching, rashes      Physical Exam:  Vital Signs Last 24 Hrs  T(C): 36 (11 May 2022 17:00), Max: 36 (11 May 2022 17:00)  T(F): 96.8 (11 May 2022 17:00), Max: 96.8 (11 May 2022 17:00)  HR: 74 (11 May 2022 18:30) (69 - 137)  BP: 135/75 (11 May 2022 17:00) (135/75 - 135/75)  BP(mean): 99 (11 May 2022 17:00) (99 - 99)  RR: 24 (11 May 2022 18:30) (16 - 26)  SpO2: 96% (11 May 2022 18:30) (90% - 97%)      ============================I/O===========================   I&O's Detail    11 May 2022 07:01  -  11 May 2022 20:23  --------------------------------------------------------  IN:    Oral Fluid: 240 mL  Total IN: 240 mL    OUT:    Voided (mL): 950 mL  Total OUT: 950 mL    Total NET: -710 mL        ============================ LABS =========================                        13.0   6.59  )-----------( 281      ( 11 May 2022 18:29 )             40.0     05-11    136  |  103  |  21  ----------------------------<  99  4.3   |  20<L>  |  0.95    Ca    8.8      11 May 2022 18:29    TPro  6.1  /  Alb  3.2<L>  /  TBili  1.1  /  DBili  x   /  AST  39  /  ALT  53<H>  /  AlkPhos  141<H>  05-11    LIVER FUNCTIONS - ( 11 May 2022 18:29 )  Alb: 3.2 g/dL / Pro: 6.1 g/dL / ALK PHOS: 141 U/L / ALT: 53 U/L / AST: 39 U/L / GGT: x           PTT - ( 11 May 2022 18:29 )  PTT:28.5 sec      ======================Micro/Rad/Cardio=================  Culture: Reviewed   CXR: Reviewed  Echo:Reviewed  ======================================================  PAST MEDICAL & SURGICAL HISTORY:  Depression      Constipation      Urinary retention      History of hernia repair        ====================ASSESSMENT ==============  76 yo M w/ history of HLD, bipolar disorder, anxiety and BPH coming in with exertional chest pain and dyspnea found to have 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    Plan:  ====================== NEUROLOGY=====================  No active issues  - AAOx3     ==================== RESPIRATORY======================  No active issues  - comfortable on RA     ====================CARDIOVASCULAR==================  ?STEMI  - RCA OCT w/ 70% stenosis of LA  - s/p IABP right femoral and right swanz  - Heparin GTT  - ASA and Plavix loaded  - continue with high intensity statin  - Nipride for AL reduction  - trend cardiac enzymes     ===================HEMATOLOGIC/ONC ===================  No active issues  - continue to monitor H/H    aspirin enteric coated 81 milliGRAM(s) Oral daily  heparin  Infusion 1700 Unit(s)/Hr (17 mL/Hr) IV Continuous <Continuous>    ===================== RENAL =========================  No active issues  - continue to monitor SCr  - continue to monitor UO    ==================== GASTROINTESTINAL===================  No active issues  - DASH diet  - no active issues  =======================    ENDOCRINE  =====================  No active issues  - f/u A1C  - f/u TSH      atorvastatin 80 milliGRAM(s) Oral at bedtime    ========================INFECTIOUS DISEASE================  - Afebrile; will monitor fever curve      Patient requires continuous monitoring with bedside rhythm monitoring, pulse ox monitoring, and intermittent blood gas analysis. Care plan discussed with ICU care team. Patient remained critical and at risk for life threatening decompensation.  Patient seen, examined and plan discussed with CCU team during rounds.     I have personally provided 35 minutes of critical care time excluding time spent on separate procedures, in addition to initial critical care time provided by the CICU Attending, Dr. Mohamud

## 2022-05-11 NOTE — ASSESSMENT
[FreeTextEntry1] : EKG is of concern with some ST segment elevation in V2 and V3 with a QS pattern consistent with infarction.  We discussed etiology of dyspnea which may be on the basis of coronary disease or even myocarditis and even COVID or inflammatory changes. He is referred to emergency room and initial troponin is elevated 260 with a BNP of 4724 which confers high risk. We discussed the options including cardiac catheterization and angiography vs medical therapy. Care is coordinated with Ivon in the emergency room with consideration to urgent cath based upon signs and symptoms. Consider heparin aspirin Plavix beta-blocker statin echo and urgent cath.We discussed the pros and cons of plain treadmill stress testing nuclear stress testing and angiography including a sensitivity analysis.\par More than half of the face to face encounter of  60 minutes  was spent in counseling the patient with respect to an abnormal EKG and cardiovascular risk\par \par Quality measures \par Tobacco intervention not indicated\par Statin for prevention of cardiovascular disease probably indicated\par Hypertension compensated\par Aspirin for ischemic vascular disease probably indicated\par Tobacco screening cessation and intervention not indicated\par \par Medical necessity\par This is a high encounter based upon two or more chronic illnesses with mild exacerbation requiring further management and evaluation.   .This represents high risk based upon referral to emergency room for possible urgent cardiac catheterization\par \par EKG is indicated for evaluation of shortness of breath chest pains and an abnormal EKG\par \par this patient has a high risk for major adverese cardiac events. \par risks benefits alternatives were discussed with the patient and his wife\par all questions were answered to their satisfaction.\par \par Discussed with Dr. Devlin in ER and Ivon\par

## 2022-05-11 NOTE — H&P ADULT - HISTORY OF PRESENT ILLNESS
76 yo M w/ history of bipolar disorder, anxiety and BPH coming in as a transfer from [facility] for further cardiac intervention. Patient presented to the hospital for intermittent SOB on exertion for two weeks associated with nausea, palpitations and chest pressure that self resolved. Patient typically active; swimming 3x a week at baseline but notes worsened fatigue while swimming. Found to have abnormal EKG concerning for impending occlusion and was transferred for catheterization.   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

## 2022-05-11 NOTE — ED ADULT NURSE NOTE - NSICDXPASTSURGICALHX_GEN_ALL_CORE_FT
PAST SURGICAL HISTORY:  History of arthroscopy of knee Right, 1987    History of tonsillectomy 1952

## 2022-05-11 NOTE — ED PROVIDER NOTE - CLINICAL SUMMARY MEDICAL DECISION MAKING FREE TEXT BOX
pt 78 yo m hx bipolar disorder, anxiety and bph p/w 2 weeks of SOB worse on excretion and "panic attacks" and gets nausea and palpitations and chest pressure that self resolve. hasn't taken anything for symptoms. no changed in meds   swims 3x a week normally but has been more fatigued swimming  denies fever, chills, numbness, tingling, weakness  sent in by Dr. Walden for EKG changes

## 2022-05-11 NOTE — H&P ADULT - NSHPPHYSICALEXAM_GEN_ALL_CORE
GENERAL: well appearing in no acute distress, non-toxic appearing  HEAD: normocephalic, atraumatic  HENT: airway intact, neck supple  EYES: normal conjunctiva  CARDIAC: regular rate and rhythm, normal S1S2, no appreciable murmurs, 2+ pulses in UE/LE b/l  PULM: normal breath sounds, clear to ascultation bilaterally, no rales, rhonchi, wheezing  GI: abdomen nondistended, soft, nontender, no guarding, rebound tenderness  NEURO: no focal motor or sensory deficits  MSK: no peripheral edema, no calf tenderness b/l  SKIN: well-perfused, extremities warm, no visible rashes

## 2022-05-12 LAB
A1C WITH ESTIMATED AVERAGE GLUCOSE RESULT: 5.9 % — HIGH (ref 4–5.6)
ALBUMIN SERPL ELPH-MCNC: 2.7 G/DL — LOW (ref 3.3–5)
ALBUMIN SERPL ELPH-MCNC: 2.8 G/DL — LOW (ref 3.3–5)
ALP SERPL-CCNC: 114 U/L — SIGNIFICANT CHANGE UP (ref 40–120)
ALP SERPL-CCNC: 120 U/L — SIGNIFICANT CHANGE UP (ref 40–120)
ALT FLD-CCNC: 40 U/L — SIGNIFICANT CHANGE UP (ref 10–45)
ALT FLD-CCNC: 45 U/L — SIGNIFICANT CHANGE UP (ref 10–45)
ANION GAP SERPL CALC-SCNC: 10 MMOL/L — SIGNIFICANT CHANGE UP (ref 5–17)
ANION GAP SERPL CALC-SCNC: 11 MMOL/L — SIGNIFICANT CHANGE UP (ref 5–17)
APTT BLD: 125.6 SEC — CRITICAL HIGH (ref 27.5–35.5)
APTT BLD: 138 SEC — CRITICAL HIGH (ref 27.5–35.5)
APTT BLD: 83.2 SEC — HIGH (ref 27.5–35.5)
AST SERPL-CCNC: 34 U/L — SIGNIFICANT CHANGE UP (ref 10–40)
AST SERPL-CCNC: 38 U/L — SIGNIFICANT CHANGE UP (ref 10–40)
BASE EXCESS BLDMV CALC-SCNC: -0.7 MMOL/L — SIGNIFICANT CHANGE UP (ref -3–3)
BASE EXCESS BLDMV CALC-SCNC: -0.8 MMOL/L — SIGNIFICANT CHANGE UP (ref -3–3)
BASE EXCESS BLDMV CALC-SCNC: 0.1 MMOL/L — SIGNIFICANT CHANGE UP (ref -3–3)
BASE EXCESS BLDMV CALC-SCNC: 0.8 MMOL/L — SIGNIFICANT CHANGE UP (ref -3–3)
BILIRUB SERPL-MCNC: 0.6 MG/DL — SIGNIFICANT CHANGE UP (ref 0.2–1.2)
BILIRUB SERPL-MCNC: 0.7 MG/DL — SIGNIFICANT CHANGE UP (ref 0.2–1.2)
BUN SERPL-MCNC: 26 MG/DL — HIGH (ref 7–23)
BUN SERPL-MCNC: 27 MG/DL — HIGH (ref 7–23)
CALCIUM SERPL-MCNC: 8.2 MG/DL — LOW (ref 8.4–10.5)
CALCIUM SERPL-MCNC: 8.5 MG/DL — SIGNIFICANT CHANGE UP (ref 8.4–10.5)
CHLORIDE SERPL-SCNC: 105 MMOL/L — SIGNIFICANT CHANGE UP (ref 96–108)
CHLORIDE SERPL-SCNC: 106 MMOL/L — SIGNIFICANT CHANGE UP (ref 96–108)
CHOLEST SERPL-MCNC: 126 MG/DL — SIGNIFICANT CHANGE UP
CK MB BLD-MCNC: 4 % — HIGH (ref 0–3.5)
CK MB CFR SERPL CALC: 5.4 NG/ML — SIGNIFICANT CHANGE UP (ref 0–6.7)
CK SERPL-CCNC: 136 U/L — SIGNIFICANT CHANGE UP (ref 30–200)
CO2 BLDMV-SCNC: 24 MMOL/L — SIGNIFICANT CHANGE UP (ref 21–29)
CO2 BLDMV-SCNC: 25 MMOL/L — SIGNIFICANT CHANGE UP (ref 21–29)
CO2 SERPL-SCNC: 20 MMOL/L — LOW (ref 22–31)
CO2 SERPL-SCNC: 21 MMOL/L — LOW (ref 22–31)
CREAT SERPL-MCNC: 1.08 MG/DL — SIGNIFICANT CHANGE UP (ref 0.5–1.3)
CREAT SERPL-MCNC: 1.22 MG/DL — SIGNIFICANT CHANGE UP (ref 0.5–1.3)
EGFR: 61 ML/MIN/1.73M2 — SIGNIFICANT CHANGE UP
EGFR: 71 ML/MIN/1.73M2 — SIGNIFICANT CHANGE UP
ESTIMATED AVERAGE GLUCOSE: 123 MG/DL — HIGH (ref 68–114)
GAS PNL BLDMV: SIGNIFICANT CHANGE UP
GLUCOSE SERPL-MCNC: 105 MG/DL — HIGH (ref 70–99)
GLUCOSE SERPL-MCNC: 131 MG/DL — HIGH (ref 70–99)
HCO3 BLDMV-SCNC: 23 MMOL/L — SIGNIFICANT CHANGE UP (ref 20–28)
HCO3 BLDMV-SCNC: 24 MMOL/L — SIGNIFICANT CHANGE UP (ref 20–28)
HCT VFR BLD CALC: 36.8 % — LOW (ref 39–50)
HCT VFR BLD CALC: 37.1 % — LOW (ref 39–50)
HCT VFR BLD CALC: 37.3 % — LOW (ref 39–50)
HCV AB S/CO SERPL IA: 0.12 S/CO — SIGNIFICANT CHANGE UP (ref 0–0.99)
HCV AB SERPL-IMP: SIGNIFICANT CHANGE UP
HDLC SERPL-MCNC: 50 MG/DL — SIGNIFICANT CHANGE UP
HGB BLD-MCNC: 12 G/DL — LOW (ref 13–17)
HGB BLD-MCNC: 12 G/DL — LOW (ref 13–17)
HGB BLD-MCNC: 12.1 G/DL — LOW (ref 13–17)
HGB BLD-MCNC: 12.1 G/DL — LOW (ref 13–17)
HGB BLD-MCNC: 12.2 G/DL — LOW (ref 13–17)
HOROWITZ INDEX BLDMV+IHG-RTO: 36 — SIGNIFICANT CHANGE UP
HOROWITZ INDEX BLDMV+IHG-RTO: 36 — SIGNIFICANT CHANGE UP
LACTATE SERPL-SCNC: 0.9 MMOL/L — SIGNIFICANT CHANGE UP (ref 0.7–2)
LIPID PNL WITH DIRECT LDL SERPL: 64 MG/DL — SIGNIFICANT CHANGE UP
MAGNESIUM SERPL-MCNC: 1.9 MG/DL — SIGNIFICANT CHANGE UP (ref 1.6–2.6)
MAGNESIUM SERPL-MCNC: 2 MG/DL — SIGNIFICANT CHANGE UP (ref 1.6–2.6)
MCHC RBC-ENTMCNC: 30.5 PG — SIGNIFICANT CHANGE UP (ref 27–34)
MCHC RBC-ENTMCNC: 30.6 PG — SIGNIFICANT CHANGE UP (ref 27–34)
MCHC RBC-ENTMCNC: 30.7 PG — SIGNIFICANT CHANGE UP (ref 27–34)
MCHC RBC-ENTMCNC: 30.9 PG — SIGNIFICANT CHANGE UP (ref 27–34)
MCHC RBC-ENTMCNC: 31 PG — SIGNIFICANT CHANGE UP (ref 27–34)
MCHC RBC-ENTMCNC: 32.3 GM/DL — SIGNIFICANT CHANGE UP (ref 32–36)
MCHC RBC-ENTMCNC: 32.4 GM/DL — SIGNIFICANT CHANGE UP (ref 32–36)
MCHC RBC-ENTMCNC: 32.6 GM/DL — SIGNIFICANT CHANGE UP (ref 32–36)
MCHC RBC-ENTMCNC: 32.6 GM/DL — SIGNIFICANT CHANGE UP (ref 32–36)
MCHC RBC-ENTMCNC: 32.9 GM/DL — SIGNIFICANT CHANGE UP (ref 32–36)
MCV RBC AUTO: 93.9 FL — SIGNIFICANT CHANGE UP (ref 80–100)
MCV RBC AUTO: 94.1 FL — SIGNIFICANT CHANGE UP (ref 80–100)
MCV RBC AUTO: 94.2 FL — SIGNIFICANT CHANGE UP (ref 80–100)
MCV RBC AUTO: 94.4 FL — SIGNIFICANT CHANGE UP (ref 80–100)
MCV RBC AUTO: 95.1 FL — SIGNIFICANT CHANGE UP (ref 80–100)
NON HDL CHOLESTEROL: 76 MG/DL — SIGNIFICANT CHANGE UP
NRBC # BLD: 0 /100 WBCS — SIGNIFICANT CHANGE UP (ref 0–0)
O2 CT VFR BLD CALC: 36 MMHG — SIGNIFICANT CHANGE UP (ref 30–65)
O2 CT VFR BLD CALC: 36 MMHG — SIGNIFICANT CHANGE UP (ref 30–65)
O2 CT VFR BLD CALC: 38 MMHG — SIGNIFICANT CHANGE UP (ref 30–65)
O2 CT VFR BLD CALC: 44 MMHG — SIGNIFICANT CHANGE UP (ref 30–65)
PCO2 BLDMV: 34 MMHG — SIGNIFICANT CHANGE UP (ref 30–65)
PCO2 BLDMV: 34 MMHG — SIGNIFICANT CHANGE UP (ref 30–65)
PCO2 BLDMV: 36 MMHG — SIGNIFICANT CHANGE UP (ref 30–65)
PCO2 BLDMV: 37 MMHG — SIGNIFICANT CHANGE UP (ref 30–65)
PH BLDMV: 7.41 — SIGNIFICANT CHANGE UP (ref 7.32–7.45)
PH BLDMV: 7.42 — SIGNIFICANT CHANGE UP (ref 7.32–7.45)
PH BLDMV: 7.45 — SIGNIFICANT CHANGE UP (ref 7.32–7.45)
PH BLDMV: 7.46 — HIGH (ref 7.32–7.45)
PHOSPHATE SERPL-MCNC: 2.6 MG/DL — SIGNIFICANT CHANGE UP (ref 2.5–4.5)
PHOSPHATE SERPL-MCNC: 3.6 MG/DL — SIGNIFICANT CHANGE UP (ref 2.5–4.5)
PLATELET # BLD AUTO: 259 K/UL — SIGNIFICANT CHANGE UP (ref 150–400)
PLATELET # BLD AUTO: 267 K/UL — SIGNIFICANT CHANGE UP (ref 150–400)
PLATELET # BLD AUTO: 268 K/UL — SIGNIFICANT CHANGE UP (ref 150–400)
PLATELET # BLD AUTO: 268 K/UL — SIGNIFICANT CHANGE UP (ref 150–400)
PLATELET # BLD AUTO: 284 K/UL — SIGNIFICANT CHANGE UP (ref 150–400)
POTASSIUM SERPL-MCNC: 3.9 MMOL/L — SIGNIFICANT CHANGE UP (ref 3.5–5.3)
POTASSIUM SERPL-MCNC: 4.1 MMOL/L — SIGNIFICANT CHANGE UP (ref 3.5–5.3)
POTASSIUM SERPL-SCNC: 3.9 MMOL/L — SIGNIFICANT CHANGE UP (ref 3.5–5.3)
POTASSIUM SERPL-SCNC: 4.1 MMOL/L — SIGNIFICANT CHANGE UP (ref 3.5–5.3)
PROT SERPL-MCNC: 5.4 G/DL — LOW (ref 6–8.3)
PROT SERPL-MCNC: 5.6 G/DL — LOW (ref 6–8.3)
RBC # BLD: 3.9 M/UL — LOW (ref 4.2–5.8)
RBC # BLD: 3.91 M/UL — LOW (ref 4.2–5.8)
RBC # BLD: 3.94 M/UL — LOW (ref 4.2–5.8)
RBC # BLD: 3.95 M/UL — LOW (ref 4.2–5.8)
RBC # BLD: 3.95 M/UL — LOW (ref 4.2–5.8)
RBC # FLD: 14.4 % — SIGNIFICANT CHANGE UP (ref 10.3–14.5)
RBC # FLD: 14.6 % — HIGH (ref 10.3–14.5)
SAO2 % BLDMV: 59.8 — LOW (ref 60–90)
SAO2 % BLDMV: 60.3 — SIGNIFICANT CHANGE UP (ref 60–90)
SAO2 % BLDMV: 61.6 — SIGNIFICANT CHANGE UP (ref 60–90)
SAO2 % BLDMV: 69.9 — SIGNIFICANT CHANGE UP (ref 60–90)
SODIUM SERPL-SCNC: 136 MMOL/L — SIGNIFICANT CHANGE UP (ref 135–145)
SODIUM SERPL-SCNC: 137 MMOL/L — SIGNIFICANT CHANGE UP (ref 135–145)
TRIGL SERPL-MCNC: 57 MG/DL — SIGNIFICANT CHANGE UP
TROPONIN T, HIGH SENSITIVITY RESULT: 94 NG/L — HIGH (ref 0–51)
TROPONIN T, HIGH SENSITIVITY RESULT: 99 NG/L — HIGH (ref 0–51)
TSH SERPL-MCNC: 1.55 UIU/ML — SIGNIFICANT CHANGE UP (ref 0.27–4.2)
WBC # BLD: 7.57 K/UL — SIGNIFICANT CHANGE UP (ref 3.8–10.5)
WBC # BLD: 7.79 K/UL — SIGNIFICANT CHANGE UP (ref 3.8–10.5)
WBC # BLD: 7.9 K/UL — SIGNIFICANT CHANGE UP (ref 3.8–10.5)
WBC # BLD: 8.11 K/UL — SIGNIFICANT CHANGE UP (ref 3.8–10.5)
WBC # BLD: 8.3 K/UL — SIGNIFICANT CHANGE UP (ref 3.8–10.5)
WBC # FLD AUTO: 7.57 K/UL — SIGNIFICANT CHANGE UP (ref 3.8–10.5)
WBC # FLD AUTO: 7.79 K/UL — SIGNIFICANT CHANGE UP (ref 3.8–10.5)
WBC # FLD AUTO: 7.9 K/UL — SIGNIFICANT CHANGE UP (ref 3.8–10.5)
WBC # FLD AUTO: 8.11 K/UL — SIGNIFICANT CHANGE UP (ref 3.8–10.5)
WBC # FLD AUTO: 8.3 K/UL — SIGNIFICANT CHANGE UP (ref 3.8–10.5)

## 2022-05-12 PROCEDURE — 99292 CRITICAL CARE ADDL 30 MIN: CPT

## 2022-05-12 PROCEDURE — 71045 X-RAY EXAM CHEST 1 VIEW: CPT | Mod: 26

## 2022-05-12 PROCEDURE — 99291 CRITICAL CARE FIRST HOUR: CPT

## 2022-05-12 PROCEDURE — 93306 TTE W/DOPPLER COMPLETE: CPT | Mod: 26

## 2022-05-12 PROCEDURE — 93010 ELECTROCARDIOGRAM REPORT: CPT | Mod: 76

## 2022-05-12 PROCEDURE — 71045 X-RAY EXAM CHEST 1 VIEW: CPT | Mod: 26,77

## 2022-05-12 RX ORDER — SENNA PLUS 8.6 MG/1
2 TABLET ORAL AT BEDTIME
Refills: 0 | Status: DISCONTINUED | OUTPATIENT
Start: 2022-05-12 | End: 2022-05-17

## 2022-05-12 RX ORDER — POTASSIUM CHLORIDE 20 MEQ
10 PACKET (EA) ORAL ONCE
Refills: 0 | Status: COMPLETED | OUTPATIENT
Start: 2022-05-12 | End: 2022-05-12

## 2022-05-12 RX ORDER — HEPARIN SODIUM 5000 [USP'U]/ML
1500 INJECTION INTRAVENOUS; SUBCUTANEOUS
Qty: 25000 | Refills: 0 | Status: DISCONTINUED | OUTPATIENT
Start: 2022-05-12 | End: 2022-05-12

## 2022-05-12 RX ORDER — TAMSULOSIN HYDROCHLORIDE 0.4 MG/1
0.4 CAPSULE ORAL AT BEDTIME
Refills: 0 | Status: DISCONTINUED | OUTPATIENT
Start: 2022-05-12 | End: 2022-05-17

## 2022-05-12 RX ORDER — LANOLIN ALCOHOL/MO/W.PET/CERES
5 CREAM (GRAM) TOPICAL ONCE
Refills: 0 | Status: COMPLETED | OUTPATIENT
Start: 2022-05-12 | End: 2022-05-12

## 2022-05-12 RX ORDER — VENLAFAXINE HCL 75 MG
150 CAPSULE, EXT RELEASE 24 HR ORAL DAILY
Refills: 0 | Status: DISCONTINUED | OUTPATIENT
Start: 2022-05-12 | End: 2022-05-17

## 2022-05-12 RX ORDER — METOPROLOL TARTRATE 50 MG
12.5 TABLET ORAL
Refills: 0 | Status: DISCONTINUED | OUTPATIENT
Start: 2022-05-12 | End: 2022-05-13

## 2022-05-12 RX ORDER — POLYETHYLENE GLYCOL 3350 17 G/17G
17 POWDER, FOR SOLUTION ORAL DAILY
Refills: 0 | Status: DISCONTINUED | OUTPATIENT
Start: 2022-05-12 | End: 2022-05-17

## 2022-05-12 RX ORDER — HEPARIN SODIUM 5000 [USP'U]/ML
1400 INJECTION INTRAVENOUS; SUBCUTANEOUS
Qty: 25000 | Refills: 0 | Status: DISCONTINUED | OUTPATIENT
Start: 2022-05-12 | End: 2022-05-13

## 2022-05-12 RX ORDER — SODIUM NITROPRUSSIDE 50 MG/2ML
0.5 INJECTION INTRAVENOUS
Qty: 100 | Refills: 0 | Status: DISCONTINUED | OUTPATIENT
Start: 2022-05-12 | End: 2022-05-14

## 2022-05-12 RX ORDER — HYDRALAZINE HCL 50 MG
25 TABLET ORAL THREE TIMES A DAY
Refills: 0 | Status: DISCONTINUED | OUTPATIENT
Start: 2022-05-12 | End: 2022-05-13

## 2022-05-12 RX ADMIN — Medication 5 MILLIGRAM(S): at 23:59

## 2022-05-12 RX ADMIN — SODIUM NITROPRUSSIDE 6.94 MICROGRAM(S)/KG/MIN: 50 INJECTION INTRAVENOUS at 02:00

## 2022-05-12 RX ADMIN — SODIUM NITROPRUSSIDE 6.94 MICROGRAM(S)/KG/MIN: 50 INJECTION INTRAVENOUS at 21:04

## 2022-05-12 RX ADMIN — Medication 25 MILLIGRAM(S): at 21:15

## 2022-05-12 RX ADMIN — TAMSULOSIN HYDROCHLORIDE 0.4 MILLIGRAM(S): 0.4 CAPSULE ORAL at 17:32

## 2022-05-12 RX ADMIN — Medication 25 MILLIGRAM(S): at 13:15

## 2022-05-12 RX ADMIN — CLOPIDOGREL BISULFATE 75 MILLIGRAM(S): 75 TABLET, FILM COATED ORAL at 11:52

## 2022-05-12 RX ADMIN — Medication 10 MILLIEQUIVALENT(S): at 05:59

## 2022-05-12 RX ADMIN — Medication 10 MILLIGRAM(S): at 05:59

## 2022-05-12 RX ADMIN — Medication 12.5 MILLIGRAM(S): at 17:30

## 2022-05-12 RX ADMIN — HEPARIN SODIUM 16.5 UNIT(S)/HR: 5000 INJECTION INTRAVENOUS; SUBCUTANEOUS at 04:20

## 2022-05-12 RX ADMIN — Medication 81 MILLIGRAM(S): at 11:52

## 2022-05-12 RX ADMIN — ATORVASTATIN CALCIUM 80 MILLIGRAM(S): 80 TABLET, FILM COATED ORAL at 21:15

## 2022-05-12 RX ADMIN — Medication 150 MILLIGRAM(S): at 10:02

## 2022-05-12 RX ADMIN — CHLORHEXIDINE GLUCONATE 1 APPLICATION(S): 213 SOLUTION TOPICAL at 05:28

## 2022-05-12 RX ADMIN — HEPARIN SODIUM 15 UNIT(S)/HR: 5000 INJECTION INTRAVENOUS; SUBCUTANEOUS at 11:48

## 2022-05-12 RX ADMIN — HEPARIN SODIUM 14 UNIT(S)/HR: 5000 INJECTION INTRAVENOUS; SUBCUTANEOUS at 19:56

## 2022-05-12 NOTE — CHART NOTE - NSCHARTNOTEFT_GEN_A_CORE
78 yo M w/ history of bipolar disorder, anxiety and BPH coming in as a transfer from [facility] for further cardiac intervention. Patient presented to the hospital for intermittent SOB on exertion for two weeks associated with nausea, palpitations and chest pressure that self resolved. Patient typically active; swimming 3x a week at baseline but notes worsened fatigue while swimming. Found to have abnormal EKG concerning for impending occlusion and was transferred for catheterization.   On catheterization, patient was noted to have complete occlusion of RCA and 70% occlusion of the LAD with a cardiac index of 1.7 and subsequently transferred to the CICU for concerns further cardiac management  s/p IABP inserted on 5/11. Site with no bleeding/ hematoma. Patient denies chest pain/ SOB. Pending cardiac viability. Patient management by CCU team

## 2022-05-12 NOTE — CHART NOTE - NSCHARTNOTEFT_GEN_A_CORE
Patient MvO2 on initial presentation to CICU 64.6 with co/ci 4.1/1.9 giving an SVR 1619. Started on Hydral 10 TID. Rpt HD numbers with decreasing MvO2 of 54.3 and 59.8 on repeat. Still with elevated SVR. Spoke to Dr. steiner and decided to start on nipride gtt with goal Augmenting MAP 70-75. Will recheck HD numbers 2hrs after initiation

## 2022-05-12 NOTE — PROGRESS NOTE ADULT - SUBJECTIVE AND OBJECTIVE BOX
ELYSE MALAVE  MRN-[id number]  Patient is a 77y old  Male who presents with a chief complaint of Cardiogenic Shock (11 May 2022 20:23)    HPI:  76 yo M w/ history of bipolar disorder, anxiety and BPH coming in as a transfer from [facility] for further cardiac intervention. Patient presented to the hospital for intermittent SOB on exertion for two weeks associated with nausea, palpitations and chest pressure that self resolved. Patient typically active; swimming 3x a week at baseline but notes worsened fatigue while swimming. Found to have abnormal EKG concerning for impending occlusion and was transferred for catheterization.   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  (11 May 2022 17:36)      Hospital Course:    24 HOUR EVENTS:    REVIEW OF SYSTEMS:    CONSTITUTIONAL: No weakness, fevers or chills  EYES/ENT: No visual changes;  No vertigo or throat pain   NECK: No pain or stiffness  RESPIRATORY: No cough, wheezing, hemoptysis; No shortness of breath  CARDIOVASCULAR: No chest pain or palpitations  GASTROINTESTINAL: No abdominal or epigastric pain. No nausea, vomiting, or hematemesis; No diarrhea or constipation. No melena or hematochezia.  GENITOURINARY: No dysuria, frequency or hematuria  NEUROLOGICAL: No numbness or weakness  SKIN: No itching, rashes      ICU Vital Signs Last 24 Hrs  T(C): 36.6 (12 May 2022 08:00), Max: 36.7 (11 May 2022 19:00)  T(F): 97.9 (12 May 2022 08:00), Max: 98.1 (11 May 2022 19:00)  HR: 80 (12 May 2022 10:00) (63 - 137)  BP: 135/75 (11 May 2022 17:00) (135/75 - 135/75)  BP(mean): 99 (11 May 2022 17:00) (99 - 99)  ABP: --  ABP(mean): --  RR: 13 (12 May 2022 10:00) (13 - 26)  SpO2: 92% (12 May 2022 10:00) (90% - 98%)      CVP(mm Hg): 7 (05-12-22 @ 10:00) (5 - 17)  CO: 6.2 (05-12-22 @ 04:00) (3.7 - 6.2)  CI: 2.8 (05-12-22 @ 04:00) (1.7 - 2.8)  PA: 40/15 (05-12-22 @ 10:00) (34/14 - 58/29)  PA(mean): 23 (05-12-22 @ 10:00) (22 - 38)  PA(direct): --  PCWP: --  LA: --  RA: --  SVR: 2000 (05-12-22 @ 04:00) (1153 - 2000)  SVRI: --  PVR: --  PVRI: --      CAPILLARY BLOOD GLUCOSE        PHYSICAL EXAM:  GENERAL: No acute distress, well-developed  HEAD:  Atraumatic, Normocephalic  EYES: EOMI, PERRLA, conjunctiva and sclera clear  NECK: Supple, no lymphadenopathy, no JVD  CHEST/LUNG: CTAB; No wheezes, rales, or rhonchi  HEART: Regular rate and rhythm. Normal S1/S2. No murmurs, rubs, or gallops  ABDOMEN: Soft, non-tender, non-distended; normal bowel sounds, no organomegaly  EXTREMITIES:  2+ peripheral pulses b/l, No clubbing, cyanosis, or edema  NEUROLOGY: A&O x 3, no focal deficits  SKIN: No rashes or lesions    ============================I/O===========================   I&O's Detail    11 May 2022 07:01  -  12 May 2022 07:00  --------------------------------------------------------  IN:    Heparin: 168.5 mL    IV PiggyBack: 60 mL    Nitroprusside: 76.4 mL    Oral Fluid: 240 mL  Total IN: 544.9 mL    OUT:    Voided (mL): 3000 mL  Total OUT: 3000 mL    Total NET: -2455.1 mL      12 May 2022 07:01  -  12 May 2022 11:11  --------------------------------------------------------  IN:    Heparin: 49.5 mL    Nitroprusside: 37.5 mL    Oral Fluid: 240 mL  Total IN: 327 mL    OUT:    Voided (mL): 400 mL  Total OUT: 400 mL    Total NET: -73 mL        ============================ LABS =========================                        12.0   7.90  )-----------( 267      ( 12 May 2022 10:01 )             37.1     05-12    137  |  106  |  26<H>  ----------------------------<  105<H>  3.9   |  21<L>  |  1.22    Ca    8.5      12 May 2022 03:14  Phos  3.6     05-12  Mg     2.0     05-12    TPro  5.6<L>  /  Alb  2.8<L>  /  TBili  0.7  /  DBili  x   /  AST  38  /  ALT  45  /  AlkPhos  120  05-12    Troponin T, High Sensitivity Result: 94 ng/L (05-12-22 @ 10:05)  Troponin T, High Sensitivity Result: 99 ng/L (05-12-22 @ 03:14)    CKMB Units: 5.4 ng/mL (05-12-22 @ 03:14)    Creatine Kinase, Serum: 136 U/L (05-12-22 @ 03:14)    CPK Mass Assay %: 4.0 % (05-12-22 @ 03:14)        LIVER FUNCTIONS - ( 12 May 2022 03:14 )  Alb: 2.8 g/dL / Pro: 5.6 g/dL / ALK PHOS: 120 U/L / ALT: 45 U/L / AST: 38 U/L / GGT: x           PTT - ( 12 May 2022 10:01 )  PTT:138.0 sec    Lactate, Blood: 0.9 mmol/L (05-12-22 @ 03:14)  Lactate, Blood: 1.2 mmol/L (05-11-22 @ 18:29)      ======================Micro/Rad/Cardio=================  Telemtry: Reviewed   EKG: Reviewed  CXR: Reviewed  Culture: Reviewed   Echo:   Cath:   ======================================================  PAST MEDICAL & SURGICAL HISTORY:  Depression      Constipation      Urinary retention      History of hernia repair    ====================ASSESSMENT ==============  76 yo M w/ history of HLD, bipolar disorder, anxiety and BPH coming in with exertional chest pain and dyspnea found to have 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    Plan:  ====================== NEUROLOGY=====================  No active issues  - AAOx3   - C/w home dose effexor    ==================== RESPIRATORY======================  No active issues  - 92% on RA: on NC for comfort  ====================CARDIOVASCULAR==================  ?STEMI  - RCA OCT w/ 70% stenosis of LA  - s/p IABP right femoral and right swanz  - Heparin GTT  - ASA and Plavix loaded  - continue with high intensity statin  - Nipride for AL reduction  - trend cardiac enzymes     ===================HEMATOLOGIC/ONC ===================  No active issues  - continue to monitor H/H    aspirin enteric coated 81 milliGRAM(s) Oral daily  heparin  Infusion 1700 Unit(s)/Hr (17 mL/Hr) IV Continuous <Continuous>    ===================== RENAL =========================  No active issues  - continue to monitor SCr  - continue to monitor UO    ==================== GASTROINTESTINAL===================  No active issues  - DASH diet  - no active issues  =======================    ENDOCRINE  =====================  No active issues  - f/u A1C  - f/u TSH      atorvastatin 80 milliGRAM(s) Oral at bedtime    ========================INFECTIOUS DISEASE================  - Afebrile; will monitor fever curve           ELYSE MALAVE  MRN-[id number]  Patient is a 77y old  Male who presents with a chief complaint of Cardiogenic Shock (11 May 2022 20:23)    HPI:  76 yo M w/ history of bipolar disorder, anxiety and BPH coming in as a transfer from [facility] for further cardiac intervention. Patient presented to the hospital for intermittent SOB on exertion for two weeks associated with nausea, palpitations and chest pressure that self resolved. Patient typically active; swimming 3x a week at baseline but notes worsened fatigue while swimming. Found to have abnormal EKG concerning for impending occlusion and was transferred for catheterization.   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  (11 May 2022 17:36)    Overnight:  Patient started on hydralazine 20 for increased SVR and subsequently added nipride for increasing SVR.     REVIEW OF SYSTEMS:    CONSTITUTIONAL: No weakness, fevers or chills  EYES/ENT: No visual changes;  No vertigo or throat pain   NECK: No pain or stiffness  RESPIRATORY: No cough, wheezing, hemoptysis; No shortness of breath  CARDIOVASCULAR: No chest pain or palpitations  GASTROINTESTINAL: No abdominal or epigastric pain. No nausea, vomiting, or hematemesis; No diarrhea or constipation. No melena or hematochezia.  GENITOURINARY: No dysuria, frequency or hematuria  NEUROLOGICAL: No numbness or weakness  SKIN: No itching, rashes      ICU Vital Signs Last 24 Hrs  T(C): 36.6 (12 May 2022 08:00), Max: 36.7 (11 May 2022 19:00)  T(F): 97.9 (12 May 2022 08:00), Max: 98.1 (11 May 2022 19:00)  HR: 80 (12 May 2022 10:00) (63 - 137)  BP: 135/75 (11 May 2022 17:00) (135/75 - 135/75)  BP(mean): 99 (11 May 2022 17:00) (99 - 99)  ABP: --  ABP(mean): --  RR: 13 (12 May 2022 10:00) (13 - 26)  SpO2: 92% (12 May 2022 10:00) (90% - 98%)      CVP(mm Hg): 7 (05-12-22 @ 10:00) (5 - 17)  CO: 6.2 (05-12-22 @ 04:00) (3.7 - 6.2)  CI: 2.8 (05-12-22 @ 04:00) (1.7 - 2.8)  PA: 40/15 (05-12-22 @ 10:00) (34/14 - 58/29)  PA(mean): 23 (05-12-22 @ 10:00) (22 - 38)  PA(direct): --  PCWP: --  LA: --  RA: --  SVR: 2000 (05-12-22 @ 04:00) (1153 - 2000)  SVRI: --  PVR: --  PVRI: --      CAPILLARY BLOOD GLUCOSE        PHYSICAL EXAM:  GENERAL: No acute distress, well-developed  HEAD:  Atraumatic, Normocephalic  EYES: EOMI, PERRLA, conjunctiva and sclera clear  NECK: Supple, no lymphadenopathy, no JVD  CHEST/LUNG: CTAB; No wheezes, rales, or rhonchi  HEART: Regular rate and rhythm. Normal S1/S2. No murmurs, rubs, or gallops  ABDOMEN: Soft, non-tender, non-distended; normal bowel sounds, no organomegaly  EXTREMITIES:  2+ peripheral pulses b/l, No clubbing, cyanosis, or edema  NEUROLOGY: A&O x 3, no focal deficits  SKIN: No rashes or lesions    ============================I/O===========================   I&O's Detail    11 May 2022 07:01  -  12 May 2022 07:00  --------------------------------------------------------  IN:    Heparin: 168.5 mL    IV PiggyBack: 60 mL    Nitroprusside: 76.4 mL    Oral Fluid: 240 mL  Total IN: 544.9 mL    OUT:    Voided (mL): 3000 mL  Total OUT: 3000 mL    Total NET: -2455.1 mL      12 May 2022 07:01  -  12 May 2022 11:11  --------------------------------------------------------  IN:    Heparin: 49.5 mL    Nitroprusside: 37.5 mL    Oral Fluid: 240 mL  Total IN: 327 mL    OUT:    Voided (mL): 400 mL  Total OUT: 400 mL    Total NET: -73 mL        ============================ LABS =========================                        12.0   7.90  )-----------( 267      ( 12 May 2022 10:01 )             37.1     05-12    137  |  106  |  26<H>  ----------------------------<  105<H>  3.9   |  21<L>  |  1.22    Ca    8.5      12 May 2022 03:14  Phos  3.6     05-12  Mg     2.0     05-12    TPro  5.6<L>  /  Alb  2.8<L>  /  TBili  0.7  /  DBili  x   /  AST  38  /  ALT  45  /  AlkPhos  120  05-12    Troponin T, High Sensitivity Result: 94 ng/L (05-12-22 @ 10:05)  Troponin T, High Sensitivity Result: 99 ng/L (05-12-22 @ 03:14)    CKMB Units: 5.4 ng/mL (05-12-22 @ 03:14)    Creatine Kinase, Serum: 136 U/L (05-12-22 @ 03:14)    CPK Mass Assay %: 4.0 % (05-12-22 @ 03:14)        LIVER FUNCTIONS - ( 12 May 2022 03:14 )  Alb: 2.8 g/dL / Pro: 5.6 g/dL / ALK PHOS: 120 U/L / ALT: 45 U/L / AST: 38 U/L / GGT: x           PTT - ( 12 May 2022 10:01 )  PTT:138.0 sec    Lactate, Blood: 0.9 mmol/L (05-12-22 @ 03:14)  Lactate, Blood: 1.2 mmol/L (05-11-22 @ 18:29)      ======================Micro/Rad/Cardio=================  Telemtry: Reviewed   EKG: Reviewed  CXR: Reviewed  Culture: Reviewed   Echo:   Cath:   ======================================================  PAST MEDICAL & SURGICAL HISTORY:  Depression      Constipation      Urinary retention      History of hernia repair    ====================ASSESSMENT ==============  76 yo M w/ history of HLD, bipolar disorder, anxiety and BPH coming in with exertional chest pain and dyspnea found to have 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    Plan:  ====================== NEUROLOGY=====================  No active issues  - AAOx3   - C/w home dose effexor    ==================== RESPIRATORY======================  No active issues  - 92% on RA: on NC for comfort  ====================CARDIOVASCULAR==================  ?STEMI  - RCA OCT w/ 70% stenosis of LA  - s/p IABP right femoral and right swanz  - Heparin GTT  - ASA and Plavix loaded  - continue with high intensity statin  - Nipride for AL reduction; wean as tolerated w/ 25mg hydralazine TID  - trend cardiac enzymes   - F/U Viability study  - F/U TTE      ===================HEMATOLOGIC/ONC ===================  No active issues  - continue to monitor H/H    aspirin enteric coated 81 milliGRAM(s) Oral daily  heparin  Infusion 1700 Unit(s)/Hr (17 mL/Hr) IV Continuous <Continuous>    ===================== RENAL =========================  No active issues  - continue to monitor SCr  - continue to monitor UO    ==================== GASTROINTESTINAL===================  No active issues  - DASH diet  - no active issues  =======================    ENDOCRINE  =====================  No active issues  - f/u A1C  - f/u TSH      atorvastatin 80 milliGRAM(s) Oral at bedtime    ========================INFECTIOUS DISEASE================  - Afebrile; will monitor fever curve

## 2022-05-12 NOTE — PROGRESS NOTE ADULT - SUBJECTIVE AND OBJECTIVE BOX
ELYSE MALAVE  MRN-[id number]  Patient is a 77y old  Male who presents with a chief complaint of Cardiogenic Shock (12 May 2022 11:11)    HPI:  76 yo M w/ history of bipolar disorder, anxiety and BPH coming in as a transfer from [facility] for further cardiac intervention. Patient presented to the hospital for intermittent SOB on exertion for two weeks associated with nausea, palpitations and chest pressure that self resolved. Patient typically active; swimming 3x a week at baseline but notes worsened fatigue while swimming. Found to have abnormal EKG concerning for impending occlusion and was transferred for catheterization.   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  (11 May 2022 17:36)      24 hr events:     REVIEW OF SYSTEMS:    CONSTITUTIONAL: No weakness, fevers or chills  EYES/ENT: No visual changes;  No vertigo or throat pain   NECK: No pain or stiffness  RESPIRATORY: No cough, wheezing, hemoptysis; No shortness of breath  CARDIOVASCULAR: No chest pain or palpitations  GASTROINTESTINAL: No abdominal or epigastric pain. No nausea, vomiting, or hematemesis; No diarrhea or constipation. No melena or hematochezia.  GENITOURINARY: No dysuria, frequency or hematuria  NEUROLOGICAL: No numbness or weakness  SKIN: No itching, rashes      Physical Exam:  Vital Signs Last 24 Hrs  T(C): 37 (12 May 2022 19:00), Max: 37 (12 May 2022 17:00)  T(F): 98.6 (12 May 2022 19:00), Max: 98.6 (12 May 2022 17:00)  HR: 84 (12 May 2022 21:00) (63 - 97)  RR: 17 (12 May 2022 21:00) (13 - 27)  SpO2: 93% (12 May 2022 21:00) (91% - 98%)      ============================I/O===========================   I&O's Detail    11 May 2022 07:01  -  12 May 2022 07:00  --------------------------------------------------------  IN:    Heparin: 168.5 mL    IV PiggyBack: 60 mL    Nitroprusside: 76.4 mL    Oral Fluid: 240 mL  Total IN: 544.9 mL    OUT:    Voided (mL): 3000 mL  Total OUT: 3000 mL    Total NET: -2455.1 mL      12 May 2022 07:01  -  12 May 2022 21:43  --------------------------------------------------------  IN:    Heparin: 49.5 mL    Heparin: 105 mL    Heparin: 42 mL    IV PiggyBack: 30 mL    Nitroprusside: 170.6 mL    Oral Fluid: 480 mL  Total IN: 877.1 mL    OUT:    Voided (mL): 700 mL  Total OUT: 700 mL    Total NET: 177.1 mL        ============================ LABS =========================                        12.1   8.30  )-----------( 268      ( 12 May 2022 17:15 )             37.1     05-12    136  |  105  |  27<H>  ----------------------------<  131<H>  4.1   |  20<L>  |  1.08    Ca    8.2<L>      12 May 2022 17:15  Phos  2.6     05-12  Mg     1.9     05-12    TPro  5.4<L>  /  Alb  2.7<L>  /  TBili  0.6  /  DBili  x   /  AST  34  /  ALT  40  /  AlkPhos  114  05-12    LIVER FUNCTIONS - ( 12 May 2022 17:15 )  Alb: 2.7 g/dL / Pro: 5.4 g/dL / ALK PHOS: 114 U/L / ALT: 40 U/L / AST: 34 U/L / GGT: x           PTT - ( 12 May 2022 17:16 )  PTT:125.6 sec      ======================Micro/Rad/Cardio=================  Culture: Reviewed   CXR: Reviewed  Echo:Reviewed  ======================================================  PAST MEDICAL & SURGICAL HISTORY:  Depression      Constipation      Urinary retention      History of hernia repair        ====================ASSESSMENT ==============      Plan:  ====================== NEUROLOGY=====================  venlafaxine XR. 150 milliGRAM(s) Oral daily    ==================== RESPIRATORY======================  Mechanical Ventilation:      ====================CARDIOVASCULAR==================  hydrALAZINE 25 milliGRAM(s) Oral three times a day  metoprolol tartrate 12.5 milliGRAM(s) Oral two times a day  nitroprusside Infusion 0.5 MICROgram(s)/kG/Min (6.94 mL/Hr) IV Continuous <Continuous>  tamsulosin 0.4 milliGRAM(s) Oral at bedtime    ===================HEMATOLOGIC/ONC ===================  aspirin enteric coated 81 milliGRAM(s) Oral daily  clopidogrel Tablet 75 milliGRAM(s) Oral daily  heparin  Infusion 1400 Unit(s)/Hr (14 mL/Hr) IV Continuous <Continuous>    ===================== RENAL =========================  Continue monitoring urine output    ==================== GASTROINTESTINAL===================  polyethylene glycol 3350 17 Gram(s) Oral daily  senna 2 Tablet(s) Oral at bedtime    =======================    ENDOCRINE  =====================  atorvastatin 80 milliGRAM(s) Oral at bedtime    ========================INFECTIOUS DISEASE================           ELYSE MALAVE  MRN-[id number]  Patient is a 77y old  Male who presents with a chief complaint of Cardiogenic Shock (12 May 2022 11:11)    HPI:  78 yo M w/ history of bipolar disorder, anxiety and BPH coming in as a transfer from [facility] for further cardiac intervention. Patient presented to the hospital for intermittent SOB on exertion for two weeks associated with nausea, palpitations and chest pressure that self resolved. Patient typically active; swimming 3x a week at baseline but notes worsened fatigue while swimming. Found to have abnormal EKG concerning for impending occlusion and was transferred for catheterization.   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  (11 May 2022 17:36)      24 hr events: no acute events    REVIEW OF SYSTEMS:    CONSTITUTIONAL: No weakness, fevers or chills  EYES/ENT: No visual changes;  No vertigo or throat pain   NECK: No pain or stiffness  RESPIRATORY: No cough, wheezing, hemoptysis; No shortness of breath  CARDIOVASCULAR: No chest pain or palpitations  GASTROINTESTINAL: No abdominal or epigastric pain. No nausea, vomiting, or hematemesis; No diarrhea or constipation. No melena or hematochezia.  GENITOURINARY: No dysuria, frequency or hematuria  NEUROLOGICAL: No numbness or weakness  SKIN: No itching, rashes      Physical Exam:  Vital Signs Last 24 Hrs  T(C): 37 (12 May 2022 19:00), Max: 37 (12 May 2022 17:00)  T(F): 98.6 (12 May 2022 19:00), Max: 98.6 (12 May 2022 17:00)  HR: 84 (12 May 2022 21:00) (63 - 97)  RR: 17 (12 May 2022 21:00) (13 - 27)  SpO2: 93% (12 May 2022 21:00) (91% - 98%)      ============================I/O===========================   I&O's Detail    11 May 2022 07:01  -  12 May 2022 07:00  --------------------------------------------------------  IN:    Heparin: 168.5 mL    IV PiggyBack: 60 mL    Nitroprusside: 76.4 mL    Oral Fluid: 240 mL  Total IN: 544.9 mL    OUT:    Voided (mL): 3000 mL  Total OUT: 3000 mL    Total NET: -2455.1 mL      12 May 2022 07:01  -  12 May 2022 21:43  --------------------------------------------------------  IN:    Heparin: 49.5 mL    Heparin: 105 mL    Heparin: 42 mL    IV PiggyBack: 30 mL    Nitroprusside: 170.6 mL    Oral Fluid: 480 mL  Total IN: 877.1 mL    OUT:    Voided (mL): 700 mL  Total OUT: 700 mL    Total NET: 177.1 mL        ============================ LABS =========================                        12.1   8.30  )-----------( 268      ( 12 May 2022 17:15 )             37.1     05-12    136  |  105  |  27<H>  ----------------------------<  131<H>  4.1   |  20<L>  |  1.08    Ca    8.2<L>      12 May 2022 17:15  Phos  2.6     05-12  Mg     1.9     05-12    TPro  5.4<L>  /  Alb  2.7<L>  /  TBili  0.6  /  DBili  x   /  AST  34  /  ALT  40  /  AlkPhos  114  05-12    LIVER FUNCTIONS - ( 12 May 2022 17:15 )  Alb: 2.7 g/dL / Pro: 5.4 g/dL / ALK PHOS: 114 U/L / ALT: 40 U/L / AST: 34 U/L / GGT: x           PTT - ( 12 May 2022 17:16 )  PTT:125.6 sec      ======================Micro/Rad/Cardio=================  Culture: Reviewed   CXR: Reviewed  Echo:Reviewed  ======================================================  PAST MEDICAL & SURGICAL HISTORY:  Depression      Constipation      Urinary retention      History of hernia repair        ====================ASSESSMENT ==============    ====================ASSESSMENT ==============  78 yo M w/ history of HLD, bipolar disorder, anxiety and BPH coming in with exertional chest pain and dyspnea found to have 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    Plan:  ====================== NEUROLOGY=====================  No active issues  - AAOx3   - C/w home dose effexor    ==================== RESPIRATORY======================  No active issues  - 92% on RA: on NC for comfort  ====================CARDIOVASCULAR==================  ?STEMI  - RCA OCT w/ 70% stenosis of LA  - s/p IABP right femoral and right swanz  - Heparin GTT  - ASA and Plavix loaded  - continue with high intensity statin  - Nipride for AL reduction; wean as tolerated w/ 25mg hydralazine TID  - trend cardiac enzymes   - F/U Viability study  - F/U TTE      ===================HEMATOLOGIC/ONC ===================  No active issues  - continue to monitor H/H    aspirin enteric coated 81 milliGRAM(s) Oral daily  heparin  Infusion 1700 Unit(s)/Hr (17 mL/Hr) IV Continuous <Continuous>    ===================== RENAL =========================  No active issues  - continue to monitor SCr  - continue to monitor UO    ==================== GASTROINTESTINAL===================  No active issues  - DASH diet  - no active issues  =======================    ENDOCRINE  =====================  No active issues  - f/u A1C  - f/u TSH      atorvastatin 80 milliGRAM(s) Oral at bedtime    ========================INFECTIOUS DISEASE================  - Afebrile; will monitor fever curve         ELYSE MALAVE  MRN-[id number]  Patient is a 77y old  Male who presents with a chief complaint of Cardiogenic Shock (12 May 2022 11:11)    HPI:  76 yo M w/ history of bipolar disorder, anxiety and BPH coming in as a transfer from [facility] for further cardiac intervention. Patient presented to the hospital for intermittent SOB on exertion for two weeks associated with nausea, palpitations and chest pressure that self resolved. Patient typically active; swimming 3x a week at baseline but notes worsened fatigue while swimming. Found to have abnormal EKG concerning for impending occlusion and was transferred for catheterization.   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  (11 May 2022 17:36)      24 hr events: no acute events    REVIEW OF SYSTEMS:    CONSTITUTIONAL: No weakness, fevers or chills  EYES/ENT: No visual changes;  No vertigo or throat pain   NECK: No pain or stiffness  RESPIRATORY: No cough, wheezing, hemoptysis; No shortness of breath  CARDIOVASCULAR: No chest pain or palpitations  GASTROINTESTINAL: No abdominal or epigastric pain. No nausea, vomiting, or hematemesis; No diarrhea or constipation. No melena or hematochezia.  GENITOURINARY: No dysuria, frequency or hematuria  NEUROLOGICAL: No numbness or weakness  SKIN: No itching, rashes      Physical Exam:  Vital Signs Last 24 Hrs  T(C): 37 (12 May 2022 19:00), Max: 37 (12 May 2022 17:00)  T(F): 98.6 (12 May 2022 19:00), Max: 98.6 (12 May 2022 17:00)  HR: 84 (12 May 2022 21:00) (63 - 97)  RR: 17 (12 May 2022 21:00) (13 - 27)  SpO2: 93% (12 May 2022 21:00) (91% - 98%)      ============================I/O===========================   I&O's Detail    11 May 2022 07:01  -  12 May 2022 07:00  --------------------------------------------------------  IN:    Heparin: 168.5 mL    IV PiggyBack: 60 mL    Nitroprusside: 76.4 mL    Oral Fluid: 240 mL  Total IN: 544.9 mL    OUT:    Voided (mL): 3000 mL  Total OUT: 3000 mL    Total NET: -2455.1 mL      12 May 2022 07:01  -  12 May 2022 21:43  --------------------------------------------------------  IN:    Heparin: 49.5 mL    Heparin: 105 mL    Heparin: 42 mL    IV PiggyBack: 30 mL    Nitroprusside: 170.6 mL    Oral Fluid: 480 mL  Total IN: 877.1 mL    OUT:    Voided (mL): 700 mL  Total OUT: 700 mL    Total NET: 177.1 mL        ============================ LABS =========================                        12.1   8.30  )-----------( 268      ( 12 May 2022 17:15 )             37.1     05-12    136  |  105  |  27<H>  ----------------------------<  131<H>  4.1   |  20<L>  |  1.08    Ca    8.2<L>      12 May 2022 17:15  Phos  2.6     05-12  Mg     1.9     05-12    TPro  5.4<L>  /  Alb  2.7<L>  /  TBili  0.6  /  DBili  x   /  AST  34  /  ALT  40  /  AlkPhos  114  05-12    LIVER FUNCTIONS - ( 12 May 2022 17:15 )  Alb: 2.7 g/dL / Pro: 5.4 g/dL / ALK PHOS: 114 U/L / ALT: 40 U/L / AST: 34 U/L / GGT: x           PTT - ( 12 May 2022 17:16 )  PTT:125.6 sec      ======================Micro/Rad/Cardio=================  Culture: Reviewed   CXR: Reviewed  Echo:Reviewed  ======================================================  PAST MEDICAL & SURGICAL HISTORY:  Depression      Constipation      Urinary retention      History of hernia repair        ====================ASSESSMENT ==============    ====================ASSESSMENT ==============  76 yo M w/ history of HLD, bipolar disorder, anxiety and BPH coming in with exertional chest pain and dyspnea found to have 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    Plan:  ====================== NEUROLOGY=====================  No active issues  - AAOx3   - C/w home dose effexor    ==================== RESPIRATORY======================  No active issues  - 92% on RA: on NC for comfort  ====================CARDIOVASCULAR==================  ?STEMI  - RCA OCT w/ 70% stenosis of LA  - s/p IABP right femoral and right swanz  - Heparin GTT  - ASA and Plavix loaded  - continue with high intensity statin  - Nipride for AL reduction; wean as tolerated w/ 50mg hydralazine TID  - c/w lopressor 25 BID  - trend cardiac enzymes   - F/U Viability study  - F/U TTE      ===================HEMATOLOGIC/ONC ===================  No active issues  - continue to monitor H/H    aspirin enteric coated 81 milliGRAM(s) Oral daily  heparin  Infusion 1700 Unit(s)/Hr (17 mL/Hr) IV Continuous <Continuous>    ===================== RENAL =========================  No active issues  - continue to monitor SCr  - continue to monitor UO    ==================== GASTROINTESTINAL===================  No active issues  - DASH diet  - no active issues  =======================    ENDOCRINE  =====================  No active issues  - f/u A1C  - f/u TSH      atorvastatin 80 milliGRAM(s) Oral at bedtime    ========================INFECTIOUS DISEASE================  - Afebrile; will monitor fever curve        Patient requires continuous monitoring with bedside rhythm monitoring, pulse ox monitoring, and intermittent blood gas analysis. Care plan discussed with ICU care team. Patient remained critical and at risk for life threatening decompensation.  Patient seen, examined and plan discussed with CCU team during rounds.     I have personally provided 35 minutes of critical care time excluding time spent on separate procedures, in addition to initial critical care time provided by the CICU Attending, Dr. Fraire

## 2022-05-13 LAB
ALBUMIN SERPL ELPH-MCNC: 2.6 G/DL — LOW (ref 3.3–5)
ALP SERPL-CCNC: 104 U/L — SIGNIFICANT CHANGE UP (ref 40–120)
ALT FLD-CCNC: 35 U/L — SIGNIFICANT CHANGE UP (ref 10–45)
ANION GAP SERPL CALC-SCNC: 10 MMOL/L — SIGNIFICANT CHANGE UP (ref 5–17)
APTT BLD: 84.4 SEC — HIGH (ref 27.5–35.5)
APTT BLD: 90.7 SEC — HIGH (ref 27.5–35.5)
AST SERPL-CCNC: 29 U/L — SIGNIFICANT CHANGE UP (ref 10–40)
BASE EXCESS BLDMV CALC-SCNC: -0.9 MMOL/L — SIGNIFICANT CHANGE UP (ref -3–3)
BILIRUB SERPL-MCNC: 0.7 MG/DL — SIGNIFICANT CHANGE UP (ref 0.2–1.2)
BUN SERPL-MCNC: 21 MG/DL — SIGNIFICANT CHANGE UP (ref 7–23)
CALCIUM SERPL-MCNC: 8.2 MG/DL — LOW (ref 8.4–10.5)
CHLORIDE SERPL-SCNC: 105 MMOL/L — SIGNIFICANT CHANGE UP (ref 96–108)
CO2 BLDMV-SCNC: 23 MMOL/L — SIGNIFICANT CHANGE UP (ref 21–29)
CO2 SERPL-SCNC: 20 MMOL/L — LOW (ref 22–31)
CREAT SERPL-MCNC: 0.87 MG/DL — SIGNIFICANT CHANGE UP (ref 0.5–1.3)
EGFR: 89 ML/MIN/1.73M2 — SIGNIFICANT CHANGE UP
GAS PNL BLDMV: SIGNIFICANT CHANGE UP
GLUCOSE SERPL-MCNC: 143 MG/DL — HIGH (ref 70–99)
HCO3 BLDMV-SCNC: 22 MMOL/L — SIGNIFICANT CHANGE UP (ref 20–28)
HCT VFR BLD CALC: 34.7 % — LOW (ref 39–50)
HGB BLD-MCNC: 11.7 G/DL — LOW (ref 13–17)
HOROWITZ INDEX BLDMV+IHG-RTO: 36 — SIGNIFICANT CHANGE UP
MAGNESIUM SERPL-MCNC: 1.9 MG/DL — SIGNIFICANT CHANGE UP (ref 1.6–2.6)
MCHC RBC-ENTMCNC: 31.3 PG — SIGNIFICANT CHANGE UP (ref 27–34)
MCHC RBC-ENTMCNC: 33.7 GM/DL — SIGNIFICANT CHANGE UP (ref 32–36)
MCV RBC AUTO: 92.8 FL — SIGNIFICANT CHANGE UP (ref 80–100)
NRBC # BLD: 0 /100 WBCS — SIGNIFICANT CHANGE UP (ref 0–0)
O2 CT VFR BLD CALC: 43 MMHG — SIGNIFICANT CHANGE UP (ref 30–65)
PCO2 BLDMV: 31 MMHG — SIGNIFICANT CHANGE UP (ref 30–65)
PH BLDMV: 7.46 — HIGH (ref 7.32–7.45)
PHOSPHATE SERPL-MCNC: 2.9 MG/DL — SIGNIFICANT CHANGE UP (ref 2.5–4.5)
PLATELET # BLD AUTO: 272 K/UL — SIGNIFICANT CHANGE UP (ref 150–400)
POTASSIUM SERPL-MCNC: 3.9 MMOL/L — SIGNIFICANT CHANGE UP (ref 3.5–5.3)
POTASSIUM SERPL-SCNC: 3.9 MMOL/L — SIGNIFICANT CHANGE UP (ref 3.5–5.3)
PROT SERPL-MCNC: 5.2 G/DL — LOW (ref 6–8.3)
RBC # BLD: 3.74 M/UL — LOW (ref 4.2–5.8)
RBC # FLD: 14.4 % — SIGNIFICANT CHANGE UP (ref 10.3–14.5)
SAO2 % BLDMV: 73.1 — SIGNIFICANT CHANGE UP (ref 60–90)
SODIUM SERPL-SCNC: 135 MMOL/L — SIGNIFICANT CHANGE UP (ref 135–145)
WBC # BLD: 9.27 K/UL — SIGNIFICANT CHANGE UP (ref 3.8–10.5)
WBC # FLD AUTO: 9.27 K/UL — SIGNIFICANT CHANGE UP (ref 3.8–10.5)

## 2022-05-13 PROCEDURE — 99222 1ST HOSP IP/OBS MODERATE 55: CPT

## 2022-05-13 PROCEDURE — 93010 ELECTROCARDIOGRAM REPORT: CPT

## 2022-05-13 PROCEDURE — 78452 HT MUSCLE IMAGE SPECT MULT: CPT | Mod: 26

## 2022-05-13 PROCEDURE — 99291 CRITICAL CARE FIRST HOUR: CPT

## 2022-05-13 PROCEDURE — 71045 X-RAY EXAM CHEST 1 VIEW: CPT | Mod: 26,77

## 2022-05-13 PROCEDURE — 99292 CRITICAL CARE ADDL 30 MIN: CPT | Mod: 25

## 2022-05-13 PROCEDURE — 71045 X-RAY EXAM CHEST 1 VIEW: CPT | Mod: 26

## 2022-05-13 RX ORDER — HYDRALAZINE HCL 50 MG
50 TABLET ORAL THREE TIMES A DAY
Refills: 0 | Status: DISCONTINUED | OUTPATIENT
Start: 2022-05-13 | End: 2022-05-13

## 2022-05-13 RX ORDER — HYDRALAZINE HCL 50 MG
75 TABLET ORAL THREE TIMES A DAY
Refills: 0 | Status: DISCONTINUED | OUTPATIENT
Start: 2022-05-13 | End: 2022-05-13

## 2022-05-13 RX ORDER — MAGNESIUM SULFATE 500 MG/ML
2 VIAL (ML) INJECTION ONCE
Refills: 0 | Status: COMPLETED | OUTPATIENT
Start: 2022-05-13 | End: 2022-05-13

## 2022-05-13 RX ORDER — MIRTAZAPINE 45 MG/1
7.5 TABLET, ORALLY DISINTEGRATING ORAL AT BEDTIME
Refills: 0 | Status: DISCONTINUED | OUTPATIENT
Start: 2022-05-13 | End: 2022-05-14

## 2022-05-13 RX ORDER — CLONAZEPAM 1 MG
0.25 TABLET ORAL THREE TIMES A DAY
Refills: 0 | Status: DISCONTINUED | OUTPATIENT
Start: 2022-05-13 | End: 2022-05-16

## 2022-05-13 RX ORDER — HEPARIN SODIUM 5000 [USP'U]/ML
12 INJECTION INTRAVENOUS; SUBCUTANEOUS
Qty: 25000 | Refills: 0 | Status: DISCONTINUED | OUTPATIENT
Start: 2022-05-13 | End: 2022-05-14

## 2022-05-13 RX ORDER — MIRTAZAPINE 45 MG/1
15 TABLET, ORALLY DISINTEGRATING ORAL ONCE
Refills: 0 | Status: COMPLETED | OUTPATIENT
Start: 2022-05-13 | End: 2022-05-13

## 2022-05-13 RX ORDER — METOPROLOL TARTRATE 50 MG
25 TABLET ORAL
Refills: 0 | Status: DISCONTINUED | OUTPATIENT
Start: 2022-05-13 | End: 2022-05-14

## 2022-05-13 RX ORDER — POTASSIUM CHLORIDE 20 MEQ
20 PACKET (EA) ORAL ONCE
Refills: 0 | Status: COMPLETED | OUTPATIENT
Start: 2022-05-13 | End: 2022-05-13

## 2022-05-13 RX ORDER — HYDRALAZINE HCL 50 MG
100 TABLET ORAL EVERY 8 HOURS
Refills: 0 | Status: DISCONTINUED | OUTPATIENT
Start: 2022-05-13 | End: 2022-05-15

## 2022-05-13 RX ADMIN — HEPARIN SODIUM 13.5 UNIT(S)/HR: 5000 INJECTION INTRAVENOUS; SUBCUTANEOUS at 04:03

## 2022-05-13 RX ADMIN — POLYETHYLENE GLYCOL 3350 17 GRAM(S): 17 POWDER, FOR SOLUTION ORAL at 12:57

## 2022-05-13 RX ADMIN — HEPARIN SODIUM 12 UNIT(S)/HR: 5000 INJECTION INTRAVENOUS; SUBCUTANEOUS at 16:30

## 2022-05-13 RX ADMIN — SENNA PLUS 2 TABLET(S): 8.6 TABLET ORAL at 21:11

## 2022-05-13 RX ADMIN — SODIUM NITROPRUSSIDE 6.94 MICROGRAM(S)/KG/MIN: 50 INJECTION INTRAVENOUS at 21:12

## 2022-05-13 RX ADMIN — MIRTAZAPINE 15 MILLIGRAM(S): 45 TABLET, ORALLY DISINTEGRATING ORAL at 02:37

## 2022-05-13 RX ADMIN — Medication 20 MILLIEQUIVALENT(S): at 05:05

## 2022-05-13 RX ADMIN — CHLORHEXIDINE GLUCONATE 1 APPLICATION(S): 213 SOLUTION TOPICAL at 04:00

## 2022-05-13 RX ADMIN — Medication 50 MILLIGRAM(S): at 05:05

## 2022-05-13 RX ADMIN — CLOPIDOGREL BISULFATE 75 MILLIGRAM(S): 75 TABLET, FILM COATED ORAL at 12:56

## 2022-05-13 RX ADMIN — HEPARIN SODIUM 12 UNIT(S)/HR: 5000 INJECTION INTRAVENOUS; SUBCUTANEOUS at 21:12

## 2022-05-13 RX ADMIN — Medication 25 MILLIGRAM(S): at 05:05

## 2022-05-13 RX ADMIN — Medication 100 MILLIGRAM(S): at 21:12

## 2022-05-13 RX ADMIN — TAMSULOSIN HYDROCHLORIDE 0.4 MILLIGRAM(S): 0.4 CAPSULE ORAL at 21:11

## 2022-05-13 RX ADMIN — Medication 81 MILLIGRAM(S): at 12:56

## 2022-05-13 RX ADMIN — Medication 150 MILLIGRAM(S): at 05:05

## 2022-05-13 RX ADMIN — ATORVASTATIN CALCIUM 80 MILLIGRAM(S): 80 TABLET, FILM COATED ORAL at 21:11

## 2022-05-13 RX ADMIN — MIRTAZAPINE 7.5 MILLIGRAM(S): 45 TABLET, ORALLY DISINTEGRATING ORAL at 21:11

## 2022-05-13 RX ADMIN — Medication 75 MILLIGRAM(S): at 15:30

## 2022-05-13 RX ADMIN — Medication 25 MILLIGRAM(S): at 18:11

## 2022-05-13 RX ADMIN — Medication 25 GRAM(S): at 05:05

## 2022-05-13 NOTE — BH CONSULTATION LIAISON ASSESSMENT NOTE - HPI (INCLUDE ILLNESS QUALITY, SEVERITY, DURATION, TIMING, CONTEXT, MODIFYING FACTORS, ASSOCIATED SIGNS AND SYMPTOMS)
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  Psychiatry consulted for anxiety. Chart reviewed. Patient currently on Effexor 150mg PO qD and melatonin 5mg qHS. Received mirtazapine 15mg PO once overnight.     Patient seen and examined. Alert/cooperative/oriented and anxious-appearing. Endorses refractory depression and anxiety with episodes of panic prior to medical presentation and continuing in the hospital. Reports symptoms sufficient for diagnosis of major depressive episode (pervasive depressed mood, anhedonia, poor sleep, fatigue/anergia, and passive suicidal ideation). Endorses passive suicidal ideation within the past month but firmly denies suicidal ideation/intent/plan. Denies subjective confusion and hallucinations. No delusions elicited. Denies history of discrete periods of elevated mood with increased energy and decreased need for sleep.  Patient states he is on Effexor, mirtazapine, and clonazepam PRN insomnia at home. Shows bottle of mirtazapine: dose is 7.5mg qHS. Unsure of clonazepam dose; states it is prior prescription and current psychiatrist is not planning to refill it. ISTOP Reference #: [id number] shows no prescriptions within the past 12 months.    Patient denies tobacco smoking, marijuana, and illicit drug use. Reports sparing ETOH; one glass of wine rarely.    Regarding psychiatric history, patient reports long (~25 years) of depression and anxiety. Was formerly on stable on Paxil until 2015 at which time he was tapered off and experienced a recurrence of depressive symptoms. Started seeing his current psychiatrist, Dr. Yu, in 2015. Since then, has tried numerous medications without significant improvement. States that Dr. Yu has diagnosed him with bipolar disorder.  Denies history of suicide attempt or psychiatric hospitalization.    Collateral form wife: affirms psychiatric history as above. Affirms refractory depression/anxiety at this time. Denies acute safety concerns for patient.

## 2022-05-13 NOTE — DIETITIAN INITIAL EVALUATION ADULT - EDUCATION DIETARY MODIFICATIONS
Provided education on heart healthy nutrition therapy. Recommended limited salt intake. Reviewed foods high in salt. Stressed the importance of limited fried foods and saturated fat consumption./teach back/(2) meets goals/outcomes/verbalization

## 2022-05-13 NOTE — PROGRESS NOTE ADULT - ASSESSMENT
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    Plan:  ====================== NEUROLOGY=====================  No active issues  - AAOx3   - C/w home dose effexor    ==================== RESPIRATORY======================  No active issues  - 92% on RA: on NC for comfort  ====================CARDIOVASCULAR==================  ?STEMI  - RCA OCT w/ 70% stenosis of LA  - s/p IABP right femoral and right swanz  - Heparin GTT  - ASA and Plavix loaded  - continue with high intensity statin  - Nipride for AL reduction; wean as tolerated w/ 75mg hydralazine TID; MAP>80  - TTE: . Severe segmental left ventricular systolic dysfunction w/ EF of 35%      ===================HEMATOLOGIC/ONC ===================  No active issues  - continue to monitor H/H    aspirin enteric coated 81 milliGRAM(s) Oral daily  heparin  Infusion 1700 Unit(s)/Hr (17 mL/Hr) IV Continuous <Continuous>    ===================== RENAL =========================  No active issues  - continue to monitor SCr  - continue to monitor UO    ==================== GASTROINTESTINAL===================  No active issues  - DASH diet  - no active issues  =======================    ENDOCRINE  =====================  No active issues      atorvastatin 80 milliGRAM(s) Oral at bedtime    ========================INFECTIOUS DISEASE================  - Afebrile; will monitor fever curve     ====================ASSESSMENT ==============  76 yo M w/ history of HLD, bipolar disorder, anxiety and BPH coming in with exertional chest pain and dyspnea found to have 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    Plan:  ====================== NEUROLOGY=====================  No active issues  - AAOx3   - C/w home dose effexor    ==================== RESPIRATORY======================  No active issues  - 92% on RA: on NC for comfort  ====================CARDIOVASCULAR==================  ?STEMI  - RCA OCT w/ 70% stenosis of LA  - s/p IABP right femoral and right swanz d/c on 5/14/22  - Heparin GTT  - ASA and Plavix loaded  - continue with high intensity statin  - Nipride for AL reduction; wean as tolerated w/ 75mg hydralazine TID; MAP>80  - TTE: Severe segmental left ventricular systolic dysfunction w/ EF of 35%  - Viability: mid to distal anterior, mid to distal anteroseptal, apical, and inferior walls do not demonstrate significant viability.  The septum demonstrates mild viability. The remaining segments are viable (predominantly the lateral wall only)      ===================HEMATOLOGIC/ONC ===================  No active issues  - continue to monitor H/H    aspirin enteric coated 81 milliGRAM(s) Oral daily  heparin  Infusion 1700 Unit(s)/Hr (17 mL/Hr) IV Continuous <Continuous>    ===================== RENAL =========================  No active issues  - continue to monitor SCr  - continue to monitor UO    ==================== GASTROINTESTINAL===================  No active issues  - DASH diet  - no active issues  =======================    ENDOCRINE  =====================  No active issues      atorvastatin 80 milliGRAM(s) Oral at bedtime    ========================INFECTIOUS DISEASE================  - Afebrile; will monitor fever curve     ====================ASSESSMENT ==============  76 yo M w/ history of HLD, bipolar disorder, anxiety and BPH coming in with exertional chest pain and dyspnea found to have 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    Plan:  ====================== NEUROLOGY=====================  No active issues  - AAOx3   - C/w home dose effexor and romeron; klonipin 0.25MG TID PRN for anxiety    ==================== RESPIRATORY======================  No active issues  - 92% on RA: on NC for comfort  ====================CARDIOVASCULAR==================  ?STEMI  - RCA OCT w/ 70% stenosis of LA  - s/p IABP right femoral and right swanz d/c on 5/14/22  - Heparin GTT  - ASA and Plavix loaded  - continue with high intensity statin  - Nipride for AL reduction; wean as tolerated w/ 75mg hydralazine TID; MAP>80  - TTE: Severe segmental left ventricular systolic dysfunction w/ EF of 35%  - Viability: mid to distal anterior, mid to distal anteroseptal, apical, and inferior walls do not demonstrate significant viability.  The septum demonstrates mild viability. The remaining segments are viable (predominantly the lateral wall only)      ===================HEMATOLOGIC/ONC ===================  No active issues  - continue to monitor H/H    aspirin enteric coated 81 milliGRAM(s) Oral daily  heparin  Infusion 1700 Unit(s)/Hr (17 mL/Hr) IV Continuous <Continuous>    ===================== RENAL =========================  No active issues  - continue to monitor SCr  - continue to monitor UO    ==================== GASTROINTESTINAL===================  No active issues  - DASH diet  - no active issues  =======================    ENDOCRINE  =====================  No active issues      atorvastatin 80 milliGRAM(s) Oral at bedtime    ========================INFECTIOUS DISEASE================  - Afebrile; will monitor fever curve

## 2022-05-13 NOTE — BH CONSULTATION LIAISON ASSESSMENT NOTE - SUMMARY
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 [facility] consulted for anxiety.    Patient alert/calm/cooperative/oriented and anxious-appearing. Reporting refractory depression and anxiety at home and here in the hospital. Reports some passive suicidal ideation in the past month but firmly denies active suicidal ideation/intent/plan. Denies confusion/hallucinations. States he wishes to resume his home medications in the hospital.    RECOMMENDATIONS:  1) C/w Effexor 150mg PO qD.  2) Restart mirtazapine 7.5mg PO qHS.  3) May use clonazepam 0.25mg up to TID PRN for breakthrough anxiety.  4) [facility] Psych to follow. No psychiatric contraindication for discharge to outpatient level of care. Patient and wife provided with [facility] clinic information for follow-up if preferred. 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  Psychiatry consulted for anxiety.    Patient alert/calm/cooperative/oriented and anxious-appearing. Reporting refractory depression and anxiety at home and here in the hospital. Reports some passive suicidal ideation in the past month but firmly denies active suicidal ideation/intent/plan. Denies confusion/hallucinations. States he wishes to resume his home medications in the hospital.    RECOMMENDATIONS:  1) C/w Effexor 150mg PO qD.  2) Restart mirtazapine 7.5mg PO qHS.  3) May use clonazepam 0.25mg at qhs standing and up to TID PRN for breakthrough anxiety.  4) [facility] Psych to follow. No psychiatric contraindication for discharge to outpatient level of care. Patient and wife provided with [facility] clinic information for follow-up if preferred.

## 2022-05-13 NOTE — BH CONSULTATION LIAISON ASSESSMENT NOTE - NSBHCHARTREVIEWINVESTIGATE_PSY_A_CORE FT
Ventricular Rate 82 BPM    Atrial Rate 82 BPM    P-R Interval 184 ms    QRS Duration 90 ms    Q-T Interval 410 ms    QTC Calculation(Bazett) 479 ms    P Axis 91 degrees    R Axis -88 degrees    T Axis 133 degrees    Diagnosis Line SINUS RHYTHM WITH OCCASIONAL PREMATURE VENTRICULAR COMPLEXES  LEFT ANTERIOR FASCICULAR BLOCK  ANTEROLATERAL INFARCT (CITED ON OR BEFORE 12-MAY-2022)  ABNORMAL ECG  WHEN COMPARED WITH ECG OF 12-MAY-2022 19:42, (UNCONFIRMED)  NO SIGNIFICANT CHANGE WAS FOUND  Confirmed by TAYE COURTNEY MD ([id number]) on 5/13/2022 1:28:12 PM    Reference #: [id number]

## 2022-05-13 NOTE — PROGRESS NOTE ADULT - SUBJECTIVE AND OBJECTIVE BOX
ELYSE MALAVE  [id number]  Patient is a 77y old  Male who presents with a chief complaint of Pt is a 76 yo M w/ history of HLD, bipolar disorder, anxiety and BPH coming in with exertional chest pain and dyspnea found to have 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     (13 May 2022 12:43)    HPI:  76 yo M w/ history of bipolar disorder, anxiety and BPH coming in as a transfer from [facility] for further cardiac intervention. Patient presented to the hospital for intermittent SOB on exertion for two weeks associated with nausea, palpitations and chest pressure that self resolved. Patient typically active; swimming 3x a week at baseline but notes worsened fatigue while swimming. Found to have abnormal EKG concerning for impending occlusion and was transferred for catheterization.   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  (11 May 2022 17:36)      Hospital Course:    24 HOUR EVENTS:    REVIEW OF SYSTEMS:    CONSTITUTIONAL: No weakness, fevers or chills  EYES/ENT: No visual changes;  No vertigo or throat pain   NECK: No pain or stiffness  RESPIRATORY: No cough, wheezing, hemoptysis; No shortness of breath  CARDIOVASCULAR: No chest pain or palpitations  GASTROINTESTINAL: No abdominal or epigastric pain. No nausea, vomiting, or hematemesis; No diarrhea or constipation. No melena or hematochezia.  GENITOURINARY: No dysuria, frequency or hematuria  NEUROLOGICAL: No numbness or weakness  SKIN: No itching, rashes      ICU Vital Signs Last 24 Hrs  T(C): 37.2 (13 May 2022 19:00), Max: 37.2 (13 May 2022 19:00)  T(F): 99 (13 May 2022 19:00), Max: 99 (13 May 2022 19:00)  HR: 80 (13 May 2022 20:30) (69 - 101)  BP: --  BP(mean): --  ABP: --  ABP(mean): --  RR: 20 (13 May 2022 20:30) (13 - 31)  SpO2: 93% (13 May 2022 20:30) (90% - 95%)      CVP(mm Hg): 10 (05-13-22 @ 20:30) (-3 - 313)  CO: 8.3 (05-13-22 @ 02:55) (3.7 - 8.3)  CI: 3.8 (05-13-22 @ 02:55) (1.7 - 3.8)  PA: 44/21 (05-13-22 @ 20:30) (12/7 - 60/31)  PA(mean): 29 (05-13-22 @ 20:30) (9 - 41)  PA(direct): --  PCWP: --  LA: --  RA: --  SVR: 2000 (05-12-22 @ 04:00) (1153 - 2000)  SVRI: 704 (05-13-22 @ 02:55) (704 - 704)  PVR: --  PVRI: --      CAPILLARY BLOOD GLUCOSE          PHYSICAL EXAM:  GENERAL: No acute distress, well-developed  HEAD:  Atraumatic, Normocephalic  EYES: EOMI, PERRLA, conjunctiva and sclera clear  NECK: Supple, no lymphadenopathy, no JVD  CHEST/LUNG: CTAB; No wheezes, rales, or rhonchi  HEART: Regular rate and rhythm. Normal S1/S2. No murmurs, rubs, or gallops  ABDOMEN: Soft, non-tender, non-distended; normal bowel sounds, no organomegaly  EXTREMITIES:  2+ peripheral pulses b/l, No clubbing, cyanosis, or edema  NEUROLOGY: A&O x 3, no focal deficits  SKIN: No rashes or lesions    ============================I/O===========================   I&O's Detail    12 May 2022 07:01  -  13 May 2022 07:00  --------------------------------------------------------  IN:    Heparin: 49.5 mL    Heparin: 105 mL    Heparin: 166.5 mL    IV PiggyBack: 150 mL    Nitroprusside: 417.2 mL    Oral Fluid: 480 mL  Total IN: 1368.2 mL    OUT:    Voided (mL): 2100 mL  Total OUT: 2100 mL    Total NET: -731.8 mL      13 May 2022 07:01  -  13 May 2022 21:51  --------------------------------------------------------  IN:    Heparin: 121.5 mL    Heparin: 36 mL    IV PiggyBack: 20 mL    Nitroprusside: 307 mL    Oral Fluid: 840 mL  Total IN: 1324.5 mL    OUT:    Voided (mL): 900 mL  Total OUT: 900 mL    Total NET: 424.5 mL        ============================ LABS =========================                        11.7   9.27  )-----------( 272      ( 13 May 2022 02:55 )             34.7     05-13    135  |  105  |  21  ----------------------------<  143<H>  3.9   |  20<L>  |  0.87    Ca    8.2<L>      13 May 2022 02:55  Phos  2.9     05-13  Mg     1.9     05-13    TPro  5.2<L>  /  Alb  2.6<L>  /  TBili  0.7  /  DBili  x   /  AST  29  /  ALT  35  /  AlkPhos  104  05-13                LIVER FUNCTIONS - ( 13 May 2022 02:55 )  Alb: 2.6 g/dL / Pro: 5.2 g/dL / ALK PHOS: 104 U/L / ALT: 35 U/L / AST: 29 U/L / GGT: x           PTT - ( 13 May 2022 13:07 )  PTT:90.7 sec        ======================Micro/Rad/Cardio=================  Telemtry: Reviewed   EKG: Reviewed  CXR: Reviewed  Culture: Reviewed   Echo:   Cath:   ======================================================  PAST MEDICAL & SURGICAL HISTORY:  Depression      Constipation      Urinary retention      History of hernia repair        ====================ASSESSMENT ==============            Plan:  ====================CARDIOVASCULAR==================    Mechaincal Circulatory Support [ ] IABP,  [ ] Impella 2.5,  [ ] Impella CP  Settings:     ==Hemodynamics==    CVP:   PCWP:  PA S/D:   Cardiac Output:  Cardiac Index:   SVR:      ==Coronaries==    Last ischemic workup:   Antiplatelet regimen:   Anticoagulant:   Statin:   Beta blocker:      ==Pump==    LVEF:                              Regional Wall Motion Abnormaility?:  [ ]Yes   [ ] No, If Yes, Details  Diastolic function:  RV function:   Any change frim prior?: [ ] Yes   [ ] No, If Yes, Details:       ==Rhythm==    Current rhythm:  AM EKG Interpretation:   Anti-arrhythmic therapies:   TVP with settings:     hydrALAZINE 100 milliGRAM(s) Oral every 8 hours  metoprolol tartrate 25 milliGRAM(s) Oral two times a day  nitroprusside Infusion 0.5 MICROgram(s)/kG/Min (6.94 mL/Hr) IV Continuous <Continuous>  tamsulosin 0.4 milliGRAM(s) Oral at bedtime      ====================== NEUROLOGY=====================  clonazePAM Oral Disintegrating Tablet 0.25 milliGRAM(s) Oral three times a day PRN severe anxiety  mirtazapine 7.5 milliGRAM(s) Oral at bedtime  venlafaxine XR. 150 milliGRAM(s) Oral daily    ==================== RESPIRATORY======================  Mechanical Ventilation:          ===================== RENAL =========================    05-12-22 @ 07:01  -  05-13-22 @ 07:00  --------------------------------------------------------  IN: 1368.2 mL / OUT: 2100 mL / NET: -731.8 mL    05-13-22 @ 07:01  - 05-13-22 @ 21:51  --------------------------------------------------------  IN: 1324.5 mL / OUT: 900 mL / NET: 424.5 mL      Renal Replacement Therapy:  [ ] CRRT      [ ] IHD, Last Session:    Fluid removal:     [ ] Diuretic therapy, Regimen:       ==================== GASTROINTESTINAL===================    Last BM:   Indication for Stress Ulcer Prophylaxis, [ ] Yes    [ ] No   If Yes, Medication:     polyethylene glycol 3350 17 Gram(s) Oral daily  senna 2 Tablet(s) Oral at bedtime    ========================INFECTIOUS DISEASE================  T(C): 37.2 (05-13-22 @ 19:00), Max: 37.2 (05-13-22 @ 19:00)  WBC Count: 9.27 K/uL (05-13-22 @ 02:55)  WBC Count: 8.30 K/uL (05-12-22 @ 17:15)  WBC Count: 7.90 K/uL (05-12-22 @ 10:01)  WBC Count: 7.79 K/uL (05-12-22 @ 04:27)  WBC Count: 8.11 K/uL (05-12-22 @ 03:14)  WBC Count: 7.57 K/uL (05-11-22 @ 23:53)  WBC Count: 6.59 K/uL (05-11-22 @ 18:29)        Current Antibiotics with start date:       ===================HEMATOLOGIC/ONC ===================  Hemoglobin: 11.7 g/dL (05-13-22 @ 02:55)  Hemoglobin: 12.1 g/dL (05-12-22 @ 17:15)  Hemoglobin: 12.0 g/dL (05-12-22 @ 10:01)  Hemoglobin: 12.0 g/dL (05-12-22 @ 04:27)  Hemoglobin: 12.2 g/dL (05-12-22 @ 03:14)  Hemoglobin: 12.1 g/dL (05-11-22 @ 23:53)  Hemoglobin: 13.0 g/dL (05-11-22 @ 18:29)    Platelet Count - Automated: 272 K/uL (05-13-22 @ 02:55)  Platelet Count - Automated: 268 K/uL (05-12-22 @ 17:15)  Platelet Count - Automated: 267 K/uL (05-12-22 @ 10:01)  Platelet Count - Automated: 268 K/uL (05-12-22 @ 04:27)  Platelet Count - Automated: 284 K/uL (05-12-22 @ 03:14)  Platelet Count - Automated: 259 K/uL (05-11-22 @ 23:53)  Platelet Count - Automated: 281 K/uL (05-11-22 @ 18:29)    Chemical VTE Prophylaxis:  [ ] Lovenox    [ ] SQH   [ ]NA  Systemic Anticogaulation:  [ ] Yes    [ ] No,  If Yes, Medication:     aspirin enteric coated 81 milliGRAM(s) Oral daily  clopidogrel Tablet 75 milliGRAM(s) Oral daily  heparin  Infusion 12 Unit(s)/Hr (12 mL/Hr) IV Continuous <Continuous>    =======================    ENDOCRINE  =====================        atorvastatin 80 milliGRAM(s) Oral at bedtime      Patient requires continuous monitoring with bedside rhythm monitoring, pulse ox monitoring, and intermittent blood gas analysis. Care plan discussed with ICU care team. Patient remained critical and at risk for life threatening decompensation.  Patient seen, examined and plan discussed with CCU team during rounds.       I have personally provided __30__ minutes of critical care time excluding time spent on separate procedures, in addition to initial critical care time provided by the CICU Attending, Dr. Mobley.       ELYSE MALAVE  MRN-[id number]  Patient is a 77y old  Male who presents with a chief complaint of Pt is a 76 yo M w/ history of HLD, bipolar disorder, anxiety and BPH coming in with exertional chest pain and dyspnea found to have 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     (13 May 2022 12:43)    HPI:  76 yo M w/ history of bipolar disorder, anxiety and BPH coming in as a transfer from [facility] for further cardiac intervention. Patient presented to the hospital for intermittent SOB on exertion for two weeks associated with nausea, palpitations and chest pressure that self resolved. Patient typically active; swimming 3x a week at baseline but notes worsened fatigue while swimming. Found to have abnormal EKG concerning for impending occlusion and was transferred for catheterization.   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  (11 May 2022 17:36)      Hospital Course:    24 HOUR EVENTS:    REVIEW OF SYSTEMS:    CONSTITUTIONAL: No weakness, fevers or chills  EYES/ENT: No visual changes;  No vertigo or throat pain   NECK: No pain or stiffness  RESPIRATORY: No cough, wheezing, hemoptysis; No shortness of breath  CARDIOVASCULAR: No chest pain or palpitations  GASTROINTESTINAL: No abdominal or epigastric pain. No nausea, vomiting, or hematemesis; No diarrhea or constipation. No melena or hematochezia.  GENITOURINARY: No dysuria, frequency or hematuria  NEUROLOGICAL: No numbness or weakness  SKIN: No itching, rashes      ICU Vital Signs Last 24 Hrs  T(C): 37.2 (13 May 2022 19:00), Max: 37.2 (13 May 2022 19:00)  T(F): 99 (13 May 2022 19:00), Max: 99 (13 May 2022 19:00)  HR: 80 (13 May 2022 20:30) (69 - 101)  BP: --  BP(mean): --  ABP: --  ABP(mean): --  RR: 20 (13 May 2022 20:30) (13 - 31)  SpO2: 93% (13 May 2022 20:30) (90% - 95%)      CVP(mm Hg): 10 (05-13-22 @ 20:30) (-3 - 313)  CO: 8.3 (05-13-22 @ 02:55) (3.7 - 8.3)  CI: 3.8 (05-13-22 @ 02:55) (1.7 - 3.8)  PA: 44/21 (05-13-22 @ 20:30) (12/7 - 60/31)  PA(mean): 29 (05-13-22 @ 20:30) (9 - 41)  PA(direct): --  PCWP: --  LA: --  RA: --  SVR: 2000 (05-12-22 @ 04:00) (1153 - 2000)  SVRI: 704 (05-13-22 @ 02:55) (704 - 704)  PVR: --  PVRI: --      CAPILLARY BLOOD GLUCOSE          PHYSICAL EXAM:  GENERAL: No acute distress, well-developed  HEAD:  Atraumatic, Normocephalic  EYES: EOMI, PERRLA, conjunctiva and sclera clear  NECK: Supple, no lymphadenopathy, no JVD  CHEST/LUNG: CTAB; No wheezes, rales, or rhonchi  HEART: Regular rate and rhythm. Normal S1/S2. No murmurs, rubs, or gallops  ABDOMEN: Soft, non-tender, non-distended; normal bowel sounds, no organomegaly  EXTREMITIES:  2+ peripheral pulses b/l, No clubbing, cyanosis, or edema  NEUROLOGY: A&O x 3, no focal deficits  SKIN: No rashes or lesions    ============================I/O===========================   I&O's Detail    12 May 2022 07:01  -  13 May 2022 07:00  --------------------------------------------------------  IN:    Heparin: 49.5 mL    Heparin: 105 mL    Heparin: 166.5 mL    IV PiggyBack: 150 mL    Nitroprusside: 417.2 mL    Oral Fluid: 480 mL  Total IN: 1368.2 mL    OUT:    Voided (mL): 2100 mL  Total OUT: 2100 mL    Total NET: -731.8 mL      13 May 2022 07:01  -  13 May 2022 21:51  --------------------------------------------------------  IN:    Heparin: 121.5 mL    Heparin: 36 mL    IV PiggyBack: 20 mL    Nitroprusside: 307 mL    Oral Fluid: 840 mL  Total IN: 1324.5 mL    OUT:    Voided (mL): 900 mL  Total OUT: 900 mL    Total NET: 424.5 mL        ============================ LABS =========================                        11.7   9.27  )-----------( 272      ( 13 May 2022 02:55 )             34.7     05-13    135  |  105  |  21  ----------------------------<  143<H>  3.9   |  20<L>  |  0.87    Ca    8.2<L>      13 May 2022 02:55  Phos  2.9     05-13  Mg     1.9     05-13    TPro  5.2<L>  /  Alb  2.6<L>  /  TBili  0.7  /  DBili  x   /  AST  29  /  ALT  35  /  AlkPhos  104  05-13                LIVER FUNCTIONS - ( 13 May 2022 02:55 )  Alb: 2.6 g/dL / Pro: 5.2 g/dL / ALK PHOS: 104 U/L / ALT: 35 U/L / AST: 29 U/L / GGT: x           PTT - ( 13 May 2022 13:07 )  PTT:90.7 sec        ======================Micro/Rad/Cardio=================  Telemtry: Reviewed   EKG: Reviewed  CXR: Reviewed  Culture: Reviewed   Echo:   Cath:   ======================================================  PAST MEDICAL & SURGICAL HISTORY:  Depression      Constipation      Urinary retention      History of hernia repair        ====================ASSESSMENT ==============            Plan:  ====================CARDIOVASCULAR==================  STEMI  -s/p LHC with pRCA 80%, mRCA 100% with , pLAD 80% D70% CI 1.7 IABP was placed  -s/p cardiac viability 5/13 showing no viability - medical management  -c/w DAPt and high dose statin  -c/w nipride gtt, wean as tolerated for augmented MAP 70-75  -c/w PAC and trending MVO2  -IABP 1:1 plan for removal in the AM  -c/w heparin gtt per protocol, holding at 4AM and Factor Xa at 6am   -hydralazine increase from 75 to 100   -started isosorbide 10 in the AM   -TTE showing EF 35% with severe LV systolic dysfunction   -c/w lopressor 25 BID    hydrALAZINE 100 milliGRAM(s) Oral every 8 hours  metoprolol tartrate 25 milliGRAM(s) Oral two times a day  nitroprusside Infusion 0.5 MICROgram(s)/kG/Min (6.94 mL/Hr) IV Continuous <Continuous>  tamsulosin 0.4 milliGRAM(s) Oral at bedtime      ====================== NEUROLOGY=====================  MDD w/Anxiety   -started Klonopin 0.25 at bedtime but can increase to TID as needed   -c/w Remeron 7.5 at bedtime   -c/w Effexor 150 daily   -f/u psych recs      clonazePAM Oral Disintegrating Tablet 0.25 milliGRAM(s) Oral three times a day PRN severe anxiety  mirtazapine 7.5 milliGRAM(s) Oral at bedtime  venlafaxine XR. 150 milliGRAM(s) Oral daily    ==================== RESPIRATORY======================  Saturating > 94% on room air  -no active issues     Mechanical Ventilation:          ===================== RENAL =========================    05-12-22 @ 07:01 - 05-13-22 @ 07:00  --------------------------------------------------------  IN: 1368.2 mL / OUT: 2100 mL / NET: -731.8 mL    05-13-22 @ 07:01  - 05-13-22 @ 21:51  --------------------------------------------------------  IN: 1324.5 mL / OUT: 900 mL / NET: 424.5 mL      Renal Replacement Therapy:  [ ] CRRT      [ ] IHD, Last Session:    Fluid removal:     [ ] Diuretic therapy, Regimen:       ==================== GASTROINTESTINAL===================  Tolerating PO diet     Last BM:   Indication for Stress Ulcer Prophylaxis, [ ] Yes    [ ] No   If Yes, Medication:     polyethylene glycol 3350 17 Gram(s) Oral daily  senna 2 Tablet(s) Oral at bedtime    ========================INFECTIOUS DISEASE================  No active issues     T(C): 37.2 (05-13-22 @ 19:00), Max: 37.2 (05-13-22 @ 19:00)  WBC Count: 9.27 K/uL (05-13-22 @ 02:55)  WBC Count: 8.30 K/uL (05-12-22 @ 17:15)  WBC Count: 7.90 K/uL (05-12-22 @ 10:01)  WBC Count: 7.79 K/uL (05-12-22 @ 04:27)  WBC Count: 8.11 K/uL (05-12-22 @ 03:14)  WBC Count: 7.57 K/uL (05-11-22 @ 23:53)  WBC Count: 6.59 K/uL (05-11-22 @ 18:29)        Current Antibiotics with start date:       ===================HEMATOLOGIC/ONC ===================  Stable H/H  -no active issues     Hemoglobin: 11.7 g/dL (05-13-22 @ 02:55)  Hemoglobin: 12.1 g/dL (05-12-22 @ 17:15)  Hemoglobin: 12.0 g/dL (05-12-22 @ 10:01)  Hemoglobin: 12.0 g/dL (05-12-22 @ 04:27)  Hemoglobin: 12.2 g/dL (05-12-22 @ 03:14)  Hemoglobin: 12.1 g/dL (05-11-22 @ 23:53)  Hemoglobin: 13.0 g/dL (05-11-22 @ 18:29)    Platelet Count - Automated: 272 K/uL (05-13-22 @ 02:55)  Platelet Count - Automated: 268 K/uL (05-12-22 @ 17:15)  Platelet Count - Automated: 267 K/uL (05-12-22 @ 10:01)  Platelet Count - Automated: 268 K/uL (05-12-22 @ 04:27)  Platelet Count - Automated: 284 K/uL (05-12-22 @ 03:14)  Platelet Count - Automated: 259 K/uL (05-11-22 @ 23:53)  Platelet Count - Automated: 281 K/uL (05-11-22 @ 18:29)    Chemical VTE Prophylaxis:  [ ] Lovenox    [ ] SQH   [ ]NA  Systemic Anticogaulation:  [ ] Yes    [ ] No,  If Yes, Medication:     aspirin enteric coated 81 milliGRAM(s) Oral daily  clopidogrel Tablet 75 milliGRAM(s) Oral daily  heparin  Infusion 12 Unit(s)/Hr (12 mL/Hr) IV Continuous <Continuous>    =======================    ENDOCRINE  =====================  Hgb A1C on admission 5.9%  -no active issues       atorvastatin 80 milliGRAM(s) Oral at bedtime      Patient requires continuous monitoring with bedside rhythm monitoring, pulse ox monitoring, and intermittent blood gas analysis. Care plan discussed with ICU care team. Patient remained critical and at risk for life threatening decompensation.  Patient seen, examined and plan discussed with CCU team during rounds.       I have personally provided __30__ minutes of critical care time excluding time spent on separate procedures, in addition to initial critical care time provided by the CICU Attending, Dr. Mobley.

## 2022-05-13 NOTE — BH CONSULTATION LIAISON ASSESSMENT NOTE - CASE SUMMARY
Pt is a 77yr old man with long PPHx depression and anxiety, admitted for chest pain/dyspnea found to have complete RCA occlusion and partial LAD occlusion. [facility] Psychiatry consulted for anxiety.  Patient alert/calm/cooperative/oriented and anxious-appearing. Reporting refractory depression and anxiety at home and here in the hospital. Denies having any thoughts of harming himself or others at this time and has no past suicidal behaviors.  Denies confusion/hallucinations. States he wishes to resume his home medications in the hospital. Agree with restarting Clonazepam 0.25mg at bed time and as a prn and continuing Effexor and Remeron 7.5mg at bedtime.

## 2022-05-13 NOTE — PROGRESS NOTE ADULT - SUBJECTIVE AND OBJECTIVE BOX
ELYSE MALAVE  [id number]  Patient is a 77y old  Male who presents with a chief complaint of Cardiogenic Shock (12 May 2022 21:43)    HPI:  78 yo M w/ history of bipolar disorder, anxiety and BPH coming in as a transfer from [facility] for further cardiac intervention. Patient presented to the hospital for intermittent SOB on exertion for two weeks associated with nausea, palpitations and chest pressure that self resolved. Patient typically active; swimming 3x a week at baseline but notes worsened fatigue while swimming. Found to have abnormal EKG concerning for impending occlusion and was transferred for catheterization.   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  (11 May 2022 17:36)      Hospital Course:    24 HOUR EVENTS:    REVIEW OF SYSTEMS:    CONSTITUTIONAL: No weakness, fevers or chills  EYES/ENT: No visual changes;  No vertigo or throat pain   NECK: No pain or stiffness  RESPIRATORY: No cough, wheezing, hemoptysis; No shortness of breath  CARDIOVASCULAR: No chest pain or palpitations  GASTROINTESTINAL: No abdominal or epigastric pain. No nausea, vomiting, or hematemesis; No diarrhea or constipation. No melena or hematochezia.  GENITOURINARY: No dysuria, frequency or hematuria  NEUROLOGICAL: No numbness or weakness  SKIN: No itching, rashes      ICU Vital Signs Last 24 Hrs  T(C): 36.6 (13 May 2022 03:00), Max: 37 (12 May 2022 17:00)  T(F): 97.9 (13 May 2022 03:00), Max: 98.6 (12 May 2022 17:00)  HR: 82 (13 May 2022 10:00) (69 - 97)  BP: --  BP(mean): --  ABP: --  ABP(mean): --  RR: 17 (13 May 2022 10:00) (13 - 28)  SpO2: 95% (13 May 2022 10:00) (90% - 96%)      CVP(mm Hg): 7 (05-13-22 @ 10:00) (-3 - 17)  CO: 8.3 (05-13-22 @ 02:55) (3.7 - 8.3)  CI: 3.8 (05-13-22 @ 02:55) (1.7 - 3.8)  PA: 42/17 (05-13-22 @ 10:00) (12/7 - 60/31)  PA(mean): 26 (05-13-22 @ 10:00) (9 - 41)  PA(direct): --  PCWP: --  LA: --  RA: --  SVR: 2000 (05-12-22 @ 04:00) (1153 - 2000)  SVRI: 704 (05-13-22 @ 02:55) (704 - 704)  PVR: --  PVRI: --      CAPILLARY BLOOD GLUCOSE          PHYSICAL EXAM:  GENERAL: No acute distress, well-developed  HEAD:  Atraumatic, Normocephalic  EYES: EOMI, PERRLA, conjunctiva and sclera clear  NECK: Supple, no lymphadenopathy, no JVD  CHEST/LUNG: CTAB; No wheezes, rales, or rhonchi  HEART: Regular rate and rhythm. Normal S1/S2. No murmurs, rubs, or gallops  ABDOMEN: Soft, non-tender, non-distended; normal bowel sounds, no organomegaly  EXTREMITIES:  2+ peripheral pulses b/l, No clubbing, cyanosis, or edema  NEUROLOGY: A&O x 3, no focal deficits  SKIN: No rashes or lesions    ============================I/O===========================   I&O's Detail    12 May 2022 07:01  -  13 May 2022 07:00  --------------------------------------------------------  IN:    Heparin: 166.5 mL    Heparin: 49.5 mL    Heparin: 105 mL    IV PiggyBack: 150 mL    Nitroprusside: 417.2 mL    Oral Fluid: 480 mL  Total IN: 1368.2 mL    OUT:    Voided (mL): 2100 mL  Total OUT: 2100 mL    Total NET: -731.8 mL      13 May 2022 07:01  -  13 May 2022 12:01  --------------------------------------------------------  IN:    Heparin: 27 mL    IV PiggyBack: 20 mL    Nitroprusside: 55.6 mL  Total IN: 102.6 mL    OUT:  Total OUT: 0 mL    Total NET: 102.6 mL        ============================ LABS =========================                        11.7   9.27  )-----------( 272      ( 13 May 2022 02:55 )             34.7     05-13    135  |  105  |  21  ----------------------------<  143<H>  3.9   |  20<L>  |  0.87    Ca    8.2<L>      13 May 2022 02:55  Phos  2.9     05-13  Mg     1.9     05-13    TPro  5.2<L>  /  Alb  2.6<L>  /  TBili  0.7  /  DBili  x   /  AST  29  /  ALT  35  /  AlkPhos  104  05-13               ELYSE MALAVE  [id number]  Patient is a 77y old  Male who presents with a chief complaint of Cardiogenic Shock (12 May 2022 21:43)    HPI:  78 yo M w/ history of bipolar disorder, anxiety and BPH coming in as a transfer from [facility] for further cardiac intervention. Patient presented to the hospital for intermittent SOB on exertion for two weeks associated with nausea, palpitations and chest pressure that self resolved. Patient typically active; swimming 3x a week at baseline but notes worsened fatigue while swimming. Found to have abnormal EKG concerning for impending occlusion and was transferred for catheterization.   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  (11 May 2022 17:36)        24 HOUR EVENTS:  - No acute events overnight.     REVIEW OF SYSTEMS:    CONSTITUTIONAL: No weakness, fevers or chills  EYES/ENT: No visual changes;  No vertigo or throat pain   NECK: No pain or stiffness  RESPIRATORY: No cough, wheezing, hemoptysis; No shortness of breath  CARDIOVASCULAR: No chest pain or palpitations  GASTROINTESTINAL: No abdominal or epigastric pain. No nausea, vomiting, or hematemesis; No diarrhea or constipation. No melena or hematochezia.  GENITOURINARY: No dysuria, frequency or hematuria  NEUROLOGICAL: No numbness or weakness  SKIN: No itching, rashes      ICU Vital Signs Last 24 Hrs  T(C): 36.6 (13 May 2022 03:00), Max: 37 (12 May 2022 17:00)  T(F): 97.9 (13 May 2022 03:00), Max: 98.6 (12 May 2022 17:00)  HR: 82 (13 May 2022 10:00) (69 - 97)  BP: --  BP(mean): --  ABP: --  ABP(mean): --  RR: 17 (13 May 2022 10:00) (13 - 28)  SpO2: 95% (13 May 2022 10:00) (90% - 96%)      CVP(mm Hg): 7 (05-13-22 @ 10:00) (-3 - 17)  CO: 8.3 (05-13-22 @ 02:55) (3.7 - 8.3)  CI: 3.8 (05-13-22 @ 02:55) (1.7 - 3.8)  PA: 42/17 (05-13-22 @ 10:00) (12/7 - 60/31)  PA(mean): 26 (05-13-22 @ 10:00) (9 - 41)  PA(direct): --  PCWP: --  LA: --  RA: --  SVR: 2000 (05-12-22 @ 04:00) (1153 - 2000)  SVRI: 704 (05-13-22 @ 02:55) (704 - 704)  PVR: --  PVRI: --      CAPILLARY BLOOD GLUCOSE          PHYSICAL EXAM:  GENERAL: No acute distress, well-developed  HEAD:  Atraumatic, Normocephalic  EYES: EOMI, PERRLA, conjunctiva and sclera clear  NECK: Supple, no lymphadenopathy, no JVD  CHEST/LUNG: CTAB; No wheezes, rales, or rhonchi  HEART: Regular rate and rhythm. Normal S1/S2. No murmurs, rubs, or gallops  ABDOMEN: Soft, non-tender, non-distended; normal bowel sounds, no organomegaly  EXTREMITIES:  2+ peripheral pulses b/l, No clubbing, cyanosis, or edema  NEUROLOGY: A&O x 3, no focal deficits  SKIN: No rashes or lesions    ============================I/O===========================   I&O's Detail    12 May 2022 07:01  -  13 May 2022 07:00  --------------------------------------------------------  IN:    Heparin: 166.5 mL    Heparin: 49.5 mL    Heparin: 105 mL    IV PiggyBack: 150 mL    Nitroprusside: 417.2 mL    Oral Fluid: 480 mL  Total IN: 1368.2 mL    OUT:    Voided (mL): 2100 mL  Total OUT: 2100 mL    Total NET: -731.8 mL      13 May 2022 07:01  -  13 May 2022 12:01  --------------------------------------------------------  IN:    Heparin: 27 mL    IV PiggyBack: 20 mL    Nitroprusside: 55.6 mL  Total IN: 102.6 mL    OUT:  Total OUT: 0 mL    Total NET: 102.6 mL        ============================ LABS =========================                        11.7   9.27  )-----------( 272      ( 13 May 2022 02:55 )             34.7     05-13    135  |  105  |  21  ----------------------------<  143<H>  3.9   |  20<L>  |  0.87    Ca    8.2<L>      13 May 2022 02:55  Phos  2.9     05-13  Mg     1.9     05-13    TPro  5.2<L>  /  Alb  2.6<L>  /  TBili  0.7  /  DBili  x   /  AST  29  /  ALT  35  /  AlkPhos  104  05-13               ELYSE MALAVE  [id number]  Patient is a 77y old  Male who presents with a chief complaint of Pt is a 76 yo M w/ history of HLD, bipolar disorder, anxiety and BPH coming in with exertional chest pain and dyspnea found to have 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     (13 May 2022 12:43)    HPI:  76 yo M w/ history of bipolar disorder, anxiety and BPH coming in as a transfer from [facility] for further cardiac intervention. Patient presented to the hospital for intermittent SOB on exertion for two weeks associated with nausea, palpitations and chest pressure that self resolved. Patient typically active; swimming 3x a week at baseline but notes worsened fatigue while swimming. Found to have abnormal EKG concerning for impending occlusion and was transferred for catheterization.   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  (11 May 2022 17:36)      Hospital Course:    24 HOUR EVENTS:    REVIEW OF SYSTEMS:    CONSTITUTIONAL: No weakness, fevers or chills  EYES/ENT: No visual changes;  No vertigo or throat pain   NECK: No pain or stiffness  RESPIRATORY: No cough, wheezing, hemoptysis; No shortness of breath  CARDIOVASCULAR: No chest pain or palpitations  GASTROINTESTINAL: No abdominal or epigastric pain. No nausea, vomiting, or hematemesis; No diarrhea or constipation. No melena or hematochezia.  GENITOURINARY: No dysuria, frequency or hematuria  NEUROLOGICAL: No numbness or weakness  SKIN: No itching, rashes      ICU Vital Signs Last 24 Hrs  T(C): 36.6 (13 May 2022 03:00), Max: 37 (12 May 2022 17:00)  T(F): 97.9 (13 May 2022 03:00), Max: 98.6 (12 May 2022 17:00)  HR: 76 (13 May 2022 13:00) (69 - 97)  BP: --  BP(mean): --  ABP: --  ABP(mean): --  RR: 18 (13 May 2022 13:00) (13 - 28)  SpO2: 91% (13 May 2022 13:00) (90% - 96%)      CVP(mm Hg): 6 (05-13-22 @ 13:00) (-3 - 313)  CO: 8.3 (05-13-22 @ 02:55) (3.7 - 8.3)  CI: 3.8 (05-13-22 @ 02:55) (1.7 - 3.8)  PA: 37/15 (05-13-22 @ 13:00) (12/7 - 60/31)  PA(mean): 23 (05-13-22 @ 13:00) (9 - 41)  PA(direct): --  PCWP: --  LA: --  RA: --  SVR: 2000 (05-12-22 @ 04:00) (1153 - 2000)  SVRI: 704 (05-13-22 @ 02:55) (704 - 704)  PVR: --  PVRI: --      CAPILLARY BLOOD GLUCOSE          PHYSICAL EXAM:  GENERAL: No acute distress, well-developed  HEAD:  Atraumatic, Normocephalic  EYES: EOMI, PERRLA, conjunctiva and sclera clear  NECK: Supple, no lymphadenopathy, no JVD  CHEST/LUNG: CTAB; No wheezes, rales, or rhonchi  HEART: Regular rate and rhythm. Normal S1/S2. No murmurs, rubs, or gallops  ABDOMEN: Soft, non-tender, non-distended; normal bowel sounds, no organomegaly  EXTREMITIES:  2+ peripheral pulses b/l, No clubbing, cyanosis, or edema  NEUROLOGY: A&O x 3, no focal deficits  SKIN: No rashes or lesions      ============================I/O===========================   I&O's Detail    12 May 2022 07:01  -  13 May 2022 07:00  --------------------------------------------------------  IN:    Heparin: 49.5 mL    Heparin: 105 mL    Heparin: 166.5 mL    IV PiggyBack: 150 mL    Nitroprusside: 417.2 mL    Oral Fluid: 480 mL  Total IN: 1368.2 mL    OUT:    Voided (mL): 2100 mL  Total OUT: 2100 mL    Total NET: -731.8 mL      13 May 2022 07:01  -  13 May 2022 13:38  --------------------------------------------------------  IN:    Heparin: 81 mL    IV PiggyBack: 20 mL    Nitroprusside: 166.8 mL    Oral Fluid: 240 mL  Total IN: 507.8 mL    OUT:  Total OUT: 0 mL    Total NET: 507.8 mL        ============================ LABS =========================                        11.7   9.27  )-----------( 272      ( 13 May 2022 02:55 )             34.7     05-13    135  |  105  |  21  ----------------------------<  143<H>  3.9   |  20<L>  |  0.87    Ca    8.2<L>      13 May 2022 02:55  Phos  2.9     05-13  Mg     1.9     05-13    TPro  5.2<L>  /  Alb  2.6<L>  /  TBili  0.7  /  DBili  x   /  AST  29  /  ALT  35  /  AlkPhos  104  05-13                LIVER FUNCTIONS - ( 13 May 2022 02:55 )  Alb: 2.6 g/dL / Pro: 5.2 g/dL / ALK PHOS: 104 U/L / ALT: 35 U/L / AST: 29 U/L / GGT: x           PTT - ( 13 May 2022 13:07 )  PTT:90.7 sec        ======================Micro/Rad/Cardio=================  Telemtry: Reviewed   EKG: Reviewed  CXR: Reviewed  Culture: Reviewed   Echo:   Cath:   ======================================================  PAST MEDICAL & SURGICAL HISTORY:  Depression      Constipation      Urinary retention      History of hernia repair         ELYSE MALAVE  [id number]  Patient is a 77y old  Male who presents with a chief complaint of Pt is a 76 yo M w/ history of HLD, bipolar disorder, anxiety and BPH coming in with exertional chest pain and dyspnea found to have 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     (13 May 2022 12:43)    HPI:  76 yo M w/ history of bipolar disorder, anxiety and BPH coming in as a transfer from [facility] for further cardiac intervention. Patient presented to the hospital for intermittent SOB on exertion for two weeks associated with nausea, palpitations and chest pressure that self resolved. Patient typically active; swimming 3x a week at baseline but notes worsened fatigue while swimming. Found to have abnormal EKG concerning for impending occlusion and was transferred for catheterization.   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  (11 May 2022 17:36)      24 HOUR EVENTS:  - No acute events overnight.     REVIEW OF SYSTEMS:    CONSTITUTIONAL: No weakness, fevers or chills  EYES/ENT: No visual changes;  No vertigo or throat pain   NECK: No pain or stiffness  RESPIRATORY: No cough, wheezing, hemoptysis; No shortness of breath  CARDIOVASCULAR: No chest pain or palpitations  GASTROINTESTINAL: No abdominal or epigastric pain. No nausea, vomiting, or hematemesis; No diarrhea or constipation. No melena or hematochezia.  GENITOURINARY: No dysuria, frequency or hematuria  NEUROLOGICAL: No numbness or weakness  SKIN: No itching, rashes      ICU Vital Signs Last 24 Hrs  T(C): 36.6 (13 May 2022 03:00), Max: 37 (12 May 2022 17:00)  T(F): 97.9 (13 May 2022 03:00), Max: 98.6 (12 May 2022 17:00)  HR: 76 (13 May 2022 13:00) (69 - 97)  BP: --  BP(mean): --  ABP: --  ABP(mean): --  RR: 18 (13 May 2022 13:00) (13 - 28)  SpO2: 91% (13 May 2022 13:00) (90% - 96%)      CVP(mm Hg): 6 (05-13-22 @ 13:00) (-3 - 313)  CO: 8.3 (05-13-22 @ 02:55) (3.7 - 8.3)  CI: 3.8 (05-13-22 @ 02:55) (1.7 - 3.8)  PA: 37/15 (05-13-22 @ 13:00) (12/7 - 60/31)  PA(mean): 23 (05-13-22 @ 13:00) (9 - 41)  PA(direct): --  PCWP: --  LA: --  RA: --  SVR: 2000 (05-12-22 @ 04:00) (1153 - 2000)  SVRI: 704 (05-13-22 @ 02:55) (704 - 704)  PVR: --  PVRI: --      CAPILLARY BLOOD GLUCOSE          PHYSICAL EXAM:  GENERAL: No acute distress, well-developed  HEAD:  Atraumatic, Normocephalic  EYES: EOMI, PERRLA, conjunctiva and sclera clear  NECK: Supple, no lymphadenopathy, no JVD  CHEST/LUNG: CTAB; No wheezes, rales, or rhonchi  HEART: Regular rate and rhythm. Normal S1/S2. No murmurs, rubs, or gallops  ABDOMEN: Soft, non-tender, non-distended; normal bowel sounds, no organomegaly  EXTREMITIES:  2+ peripheral pulses b/l, No clubbing, cyanosis, or edema  NEUROLOGY: A&O x 3, no focal deficits  SKIN: No rashes or lesions      ============================I/O===========================   I&O's Detail    12 May 2022 07:01  -  13 May 2022 07:00  --------------------------------------------------------  IN:    Heparin: 49.5 mL    Heparin: 105 mL    Heparin: 166.5 mL    IV PiggyBack: 150 mL    Nitroprusside: 417.2 mL    Oral Fluid: 480 mL  Total IN: 1368.2 mL    OUT:    Voided (mL): 2100 mL  Total OUT: 2100 mL    Total NET: -731.8 mL      13 May 2022 07:01  -  13 May 2022 13:38  --------------------------------------------------------  IN:    Heparin: 81 mL    IV PiggyBack: 20 mL    Nitroprusside: 166.8 mL    Oral Fluid: 240 mL  Total IN: 507.8 mL    OUT:  Total OUT: 0 mL    Total NET: 507.8 mL        ============================ LABS =========================                        11.7   9.27  )-----------( 272      ( 13 May 2022 02:55 )             34.7     05-13    135  |  105  |  21  ----------------------------<  143<H>  3.9   |  20<L>  |  0.87    Ca    8.2<L>      13 May 2022 02:55  Phos  2.9     05-13  Mg     1.9     05-13    TPro  5.2<L>  /  Alb  2.6<L>  /  TBili  0.7  /  DBili  x   /  AST  29  /  ALT  35  /  AlkPhos  104  05-13                LIVER FUNCTIONS - ( 13 May 2022 02:55 )  Alb: 2.6 g/dL / Pro: 5.2 g/dL / ALK PHOS: 104 U/L / ALT: 35 U/L / AST: 29 U/L / GGT: x           PTT - ( 13 May 2022 13:07 )  PTT:90.7 sec        ======================Micro/Rad/Cardio=================  Telemtry: Reviewed   EKG: Reviewed  CXR: Reviewed  Culture: Reviewed   Echo:   Cath:   ======================================================  PAST MEDICAL & SURGICAL HISTORY:  Depression      Constipation      Urinary retention      History of hernia repair

## 2022-05-13 NOTE — BH CONSULTATION LIAISON ASSESSMENT NOTE - DETAILS
mother: schizophrenia, brother: depression see HPI: passive suicidal ideation without active intent/plan within the past month

## 2022-05-13 NOTE — DIETITIAN INITIAL EVALUATION ADULT - PERTINENT LABORATORY DATA
05-13    135  |  105  |  21  ----------------------------<  143<H>  3.9   |  20<L>  |  0.87    Ca    8.2<L>      13 May 2022 02:55  Phos  2.9     05-13  Mg     1.9     05-13    TPro  5.2<L>  /  Alb  2.6<L>  /  TBili  0.7  /  DBili  x   /  AST  29  /  ALT  35  /  AlkPhos  104  05-13  A1C with Estimated Average Glucose Result: 5.9 % (05-11-22 @ 18:29)

## 2022-05-13 NOTE — BH CONSULTATION LIAISON ASSESSMENT NOTE - NSBHCONSULTFOLLOWAFTERCARE_PSY_A_CORE FT
Patient can follow up with his outpatient psychiatrist Dr. Yu. Also provided patient and his wife with information for [facility] ([phone]) if patient desires alternate follow up.

## 2022-05-13 NOTE — BH CONSULTATION LIAISON ASSESSMENT NOTE - RISK ASSESSMENT
Patient is at low acute and elevated chronic risk of suicide. Acute risk factors include mood episode, anxiety with panic, acute medical problems, and recent passive suicidal ideation. Patient denies active suicidal ideation/intent/plan. No history of suicide attempts, psychiatric hospitalizations, or substance abuse problems. No evidence of acute baldemar or substance intoxication. Chronic risk factors include age, male gender, and history of mood disorder/anxiety. Protective factors include strong relationship with wife, stable domicile, sobriety, future-oriented thinking, and strong engagement with current psychiatric outpatient treatment. Patient does not meet criteria for involuntary inpatient psychiatric hospitalization as he is not at imminent risk of harm. His risk is expected to be further mitigated by continued engagement in outpatient treatment for medication management; is also considering therapy/group.

## 2022-05-13 NOTE — BH CONSULTATION LIAISON ASSESSMENT NOTE - NSBHCHARTREVIEWLAB_PSY_A_CORE FT
11.7   9.27  )-----------( 272      ( 13 May 2022 02:55 )             34.7   05-13    135  |  105  |  21  ----------------------------<  143<H>  3.9   |  20<L>  |  0.87  05-12    136  |  105  |  27<H>  ----------------------------<  131<H>  4.1   |  20<L>  |  1.08  05-12    137  |  106  |  26<H>  ----------------------------<  105<H>  3.9   |  21<L>  |  1.22  05-11    136  |  103  |  21  ----------------------------<  99  4.3   |  20<L>  |  0.95    Ca   8.2<L>   13 May 2022 02:55 /  , Ca   8.2<L>   12 May 2022 17:15 /  , Ca   8.5   12 May 2022 03:14 /  , Ca   8.8   11 May 2022 18:29 /    Phos  2.9  05-13 /, Phos  2.6  05-12 /, Phos  3.6  05-12 /  Mg   1.9   05-13 /, Mg   1.9   05-12 /, Mg   2.0   05-12 /  TPro  5.2<L>  /  Alb  2.6<L>  /  TBili  0.7  /  DBili  x   /  AST  29  /  ALT  35  /  AlkPhos  104  05-13  TPro  5.4<L>  /  Alb  2.7<L>  /  TBili  0.6  /  DBili  x   /  AST  34  /  ALT  40  /  AlkPhos  114  05-12  TPro  5.6<L>  /  Alb  2.8<L>  /  TBili  0.7  /  DBili  x   /  AST  38  /  ALT  45  /  AlkPhos  120  05-12  TPro  6.1  /  Alb  3.2<L>  /  TBili  1.1  /  DBili  x   /  AST  39  /  ALT  53<H>  /  AlkPhos  141<H>  05-11

## 2022-05-13 NOTE — DIETITIAN INITIAL EVALUATION ADULT - ORAL INTAKE PTA/DIET HISTORY
Pt reports having a good appetite and PO intake PTA; consuming >75% of most meals. Follows regular diet without therapeutic restriction.

## 2022-05-13 NOTE — BH CONSULTATION LIAISON ASSESSMENT NOTE - NSBHCHARTREVIEWVS_PSY_A_CORE FT
Vital Signs Last 24 Hrs  T(C): 36.6 (13 May 2022 03:00), Max: 37 (12 May 2022 17:00)  T(F): 97.9 (13 May 2022 03:00), Max: 98.6 (12 May 2022 17:00)  HR: 76 (13 May 2022 13:00) (69 - 97)  BP: --  BP(mean): --  RR: 18 (13 May 2022 13:00) (13 - 28)  SpO2: 91% (13 May 2022 13:00) (90% - 96%)

## 2022-05-13 NOTE — DIETITIAN INITIAL EVALUATION ADULT - REASON FOR ADMISSION
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

## 2022-05-13 NOTE — DIETITIAN INITIAL EVALUATION ADULT - ADD RECOMMEND
Reinforce nutrition education as needed - RD remains available. Provide encouragement with PO intake, menu selections, and assistance with meals as needed. Continue to monitor nutritional intake, labs, weights, BM, skin, clinical course.

## 2022-05-13 NOTE — DIETITIAN INITIAL EVALUATION ADULT - OTHER INFO
-- Pt reports UBW 190lb, endorses weight gain 2/2 cardiac issues. Dosing wt 203.9lb with most recent wt 207lb. Will continue to monitor.  -- HbA1c of 5.9% noted, pt denies any hx of pre-DM/DM.

## 2022-05-14 LAB
ALBUMIN SERPL ELPH-MCNC: 2.8 G/DL — LOW (ref 3.3–5)
ALP SERPL-CCNC: 108 U/L — SIGNIFICANT CHANGE UP (ref 40–120)
ALT FLD-CCNC: 31 U/L — SIGNIFICANT CHANGE UP (ref 10–45)
ANION GAP SERPL CALC-SCNC: 10 MMOL/L — SIGNIFICANT CHANGE UP (ref 5–17)
APTT BLD: 62.6 SEC — HIGH (ref 27.5–35.5)
APTT BLD: 65 SEC — HIGH (ref 27.5–35.5)
AST SERPL-CCNC: 26 U/L — SIGNIFICANT CHANGE UP (ref 10–40)
BASE EXCESS BLDMV CALC-SCNC: -0.8 MMOL/L — SIGNIFICANT CHANGE UP (ref -3–3)
BILIRUB SERPL-MCNC: 0.7 MG/DL — SIGNIFICANT CHANGE UP (ref 0.2–1.2)
BUN SERPL-MCNC: 19 MG/DL — SIGNIFICANT CHANGE UP (ref 7–23)
CALCIUM SERPL-MCNC: 8.4 MG/DL — SIGNIFICANT CHANGE UP (ref 8.4–10.5)
CHLORIDE SERPL-SCNC: 108 MMOL/L — SIGNIFICANT CHANGE UP (ref 96–108)
CO2 BLDMV-SCNC: 26 MMOL/L — SIGNIFICANT CHANGE UP (ref 21–29)
CO2 SERPL-SCNC: 20 MMOL/L — LOW (ref 22–31)
CREAT SERPL-MCNC: 0.95 MG/DL — SIGNIFICANT CHANGE UP (ref 0.5–1.3)
EGFR: 82 ML/MIN/1.73M2 — SIGNIFICANT CHANGE UP
GAS PNL BLDMV: SIGNIFICANT CHANGE UP
GLUCOSE SERPL-MCNC: 144 MG/DL — HIGH (ref 70–99)
HCO3 BLDMV-SCNC: 24 MMOL/L — SIGNIFICANT CHANGE UP (ref 20–28)
HCT VFR BLD CALC: 35.6 % — LOW (ref 39–50)
HGB BLD-MCNC: 11.7 G/DL — LOW (ref 13–17)
HOROWITZ INDEX BLDMV+IHG-RTO: SIGNIFICANT CHANGE UP
MAGNESIUM SERPL-MCNC: 2.2 MG/DL — SIGNIFICANT CHANGE UP (ref 1.6–2.6)
MCHC RBC-ENTMCNC: 31.1 PG — SIGNIFICANT CHANGE UP (ref 27–34)
MCHC RBC-ENTMCNC: 32.9 GM/DL — SIGNIFICANT CHANGE UP (ref 32–36)
MCV RBC AUTO: 94.7 FL — SIGNIFICANT CHANGE UP (ref 80–100)
NRBC # BLD: 0 /100 WBCS — SIGNIFICANT CHANGE UP (ref 0–0)
O2 CT VFR BLD CALC: 43 MMHG — SIGNIFICANT CHANGE UP (ref 30–65)
PCO2 BLDMV: 41 MMHG — SIGNIFICANT CHANGE UP (ref 30–65)
PH BLDMV: 7.38 — SIGNIFICANT CHANGE UP (ref 7.32–7.45)
PHOSPHATE SERPL-MCNC: 3.3 MG/DL — SIGNIFICANT CHANGE UP (ref 2.5–4.5)
PLATELET # BLD AUTO: 246 K/UL — SIGNIFICANT CHANGE UP (ref 150–400)
POTASSIUM SERPL-MCNC: 4.1 MMOL/L — SIGNIFICANT CHANGE UP (ref 3.5–5.3)
POTASSIUM SERPL-SCNC: 4.1 MMOL/L — SIGNIFICANT CHANGE UP (ref 3.5–5.3)
PROT SERPL-MCNC: 5.4 G/DL — LOW (ref 6–8.3)
RBC # BLD: 3.76 M/UL — LOW (ref 4.2–5.8)
RBC # FLD: 15.1 % — HIGH (ref 10.3–14.5)
SAO2 % BLDMV: 70.1 — SIGNIFICANT CHANGE UP (ref 60–90)
SODIUM SERPL-SCNC: 138 MMOL/L — SIGNIFICANT CHANGE UP (ref 135–145)
UFH PPP CHRO-ACNC: 0.01 IU/ML — LOW (ref 0.3–0.7)
WBC # BLD: 9.16 K/UL — SIGNIFICANT CHANGE UP (ref 3.8–10.5)
WBC # FLD AUTO: 9.16 K/UL — SIGNIFICANT CHANGE UP (ref 3.8–10.5)

## 2022-05-14 PROCEDURE — 71045 X-RAY EXAM CHEST 1 VIEW: CPT | Mod: 26

## 2022-05-14 PROCEDURE — 33968 REMOVE AORTIC ASSIST DEVICE: CPT

## 2022-05-14 PROCEDURE — 93010 ELECTROCARDIOGRAM REPORT: CPT

## 2022-05-14 PROCEDURE — 99291 CRITICAL CARE FIRST HOUR: CPT

## 2022-05-14 RX ORDER — METOPROLOL TARTRATE 50 MG
50 TABLET ORAL
Refills: 0 | Status: DISCONTINUED | OUTPATIENT
Start: 2022-05-14 | End: 2022-05-16

## 2022-05-14 RX ORDER — ISOSORBIDE DINITRATE 5 MG/1
25 TABLET ORAL THREE TIMES A DAY
Refills: 0 | Status: DISCONTINUED | OUTPATIENT
Start: 2022-05-14 | End: 2022-05-16

## 2022-05-14 RX ORDER — ONDANSETRON 8 MG/1
4 TABLET, FILM COATED ORAL ONCE
Refills: 0 | Status: COMPLETED | OUTPATIENT
Start: 2022-05-14 | End: 2022-05-14

## 2022-05-14 RX ORDER — ISOSORBIDE DINITRATE 5 MG/1
10 TABLET ORAL THREE TIMES A DAY
Refills: 0 | Status: DISCONTINUED | OUTPATIENT
Start: 2022-05-14 | End: 2022-05-14

## 2022-05-14 RX ORDER — FENTANYL CITRATE 50 UG/ML
25 INJECTION INTRAVENOUS ONCE
Refills: 0 | Status: DISCONTINUED | OUTPATIENT
Start: 2022-05-14 | End: 2022-05-14

## 2022-05-14 RX ADMIN — Medication 25 MILLIGRAM(S): at 05:28

## 2022-05-14 RX ADMIN — Medication 100 MILLIGRAM(S): at 21:51

## 2022-05-14 RX ADMIN — CHLORHEXIDINE GLUCONATE 1 APPLICATION(S): 213 SOLUTION TOPICAL at 04:28

## 2022-05-14 RX ADMIN — ISOSORBIDE DINITRATE 10 MILLIGRAM(S): 5 TABLET ORAL at 05:28

## 2022-05-14 RX ADMIN — ISOSORBIDE DINITRATE 25 MILLIGRAM(S): 5 TABLET ORAL at 23:27

## 2022-05-14 RX ADMIN — ONDANSETRON 4 MILLIGRAM(S): 8 TABLET, FILM COATED ORAL at 01:15

## 2022-05-14 RX ADMIN — Medication 100 MILLIGRAM(S): at 05:28

## 2022-05-14 RX ADMIN — TAMSULOSIN HYDROCHLORIDE 0.4 MILLIGRAM(S): 0.4 CAPSULE ORAL at 21:51

## 2022-05-14 RX ADMIN — Medication 81 MILLIGRAM(S): at 13:24

## 2022-05-14 RX ADMIN — Medication 150 MILLIGRAM(S): at 05:28

## 2022-05-14 RX ADMIN — POLYETHYLENE GLYCOL 3350 17 GRAM(S): 17 POWDER, FOR SOLUTION ORAL at 13:03

## 2022-05-14 RX ADMIN — Medication 50 MILLIGRAM(S): at 17:12

## 2022-05-14 RX ADMIN — Medication 100 MILLIGRAM(S): at 14:45

## 2022-05-14 RX ADMIN — ISOSORBIDE DINITRATE 25 MILLIGRAM(S): 5 TABLET ORAL at 13:13

## 2022-05-14 RX ADMIN — CLOPIDOGREL BISULFATE 75 MILLIGRAM(S): 75 TABLET, FILM COATED ORAL at 13:02

## 2022-05-14 RX ADMIN — ATORVASTATIN CALCIUM 80 MILLIGRAM(S): 80 TABLET, FILM COATED ORAL at 21:50

## 2022-05-14 RX ADMIN — SODIUM NITROPRUSSIDE 6.94 MICROGRAM(S)/KG/MIN: 50 INJECTION INTRAVENOUS at 13:02

## 2022-05-14 NOTE — PROGRESS NOTE ADULT - SUBJECTIVE AND OBJECTIVE BOX
ELYSE MALAVE  MRN-[id number]  Patient is a 77y old  Male who presents with a chief complaint of Cardiogenic Shock (13 May 2022 21:51)    HPI:  76 yo M w/ history of bipolar disorder, anxiety and BPH coming in as a transfer from [facility] for further cardiac intervention. Patient presented to the hospital for intermittent SOB on exertion for two weeks associated with nausea, palpitations and chest pressure that self resolved. Patient typically active; swimming 3x a week at baseline but notes worsened fatigue while swimming. Found to have abnormal EKG concerning for impending occlusion and was transferred for catheterization.   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  (11 May 2022 17:36)      Surgery/Hospital course:  5/13  LHC: pRCA 80%, mRCA 100% w/ , pLAD 80%, Diag 70%. RA 15, PA 50/14/17, PCWP 31, CI 1.7- IABP placed. LVEDP 38     5/13 TTE: Severe LV systolic dysfunction w/ EF 35%     5/13 Viability: mid-distal ant, mid-distal anterosept, apical, and inf walls no viability. Septum mild viability    Overnight events:  no overnight events, patient afebrile and hemodynamically stable , continued on nipride gtt    REVIEW OF SYSTEMS:    CONSTITUTIONAL: No weakness, fevers or chills  EYES/ENT: No visual changes;  No vertigo or throat pain   NECK: No pain or stiffness  RESPIRATORY: No cough, wheezing, hemoptysis; No shortness of breath  CARDIOVASCULAR: No chest pain or palpitations  GASTROINTESTINAL: No abdominal or epigastric pain. No nausea, vomiting, or hematemesis; No diarrhea or constipation. No melena or hematochezia.  GENITOURINARY: No dysuria, frequency or hematuria  NEUROLOGICAL: No numbness or weakness  SKIN: No itching, rashes  Physical Exam:  Vital Signs Last 24 Hrs  T(C): 36.6 (14 May 2022 07:00), Max: 37.4 (13 May 2022 23:00)  T(F): 97.9 (14 May 2022 07:00), Max: 99.3 (13 May 2022 23:00)  HR: 76 (14 May 2022 08:00) (76 - 101)  BP: --  BP(mean): --  RR: 22 (14 May 2022 08:00) (13 - 31)  SpO2: 96% (14 May 2022 08:00) (90% - 96%)  Physical Exam:   Constitutional: NAD, well-groomed, well-developed  HEENT: PERRLA, EOMI, no drainage or redness  Neck: supple,  No JVD  Respiratory: Breath Sounds equal & clear bilaterally to auscultation, no rales/rhonchi/wheezing, no accessory muscle use noted  Cardiovascular: Regular rate, regular rhythm, normal S1, S2; no murmurs or rub  Gastrointestinal: Soft, non-tender, non distended, + bowel sounds  Extremities: GARCIA x 4, no peripheral edema, no cyanosis, no clubbing   Neurological: A+O x 3; speech clear and intact; no sensory, motor  deficits, normal reflexes  Skin: warm, dry, well perfused  Incisions: R Femoral: clean/dry/intact w/o surround erythema, non tender to active bleeding    ============================I/O===========================   I&O's Detail    13 May 2022 07:01  -  14 May 2022 07:00  --------------------------------------------------------  IN:    Heparin: 121.5 mL    Heparin: 144 mL    IV PiggyBack: 20 mL    Nitroprusside: 535.3 mL    Oral Fluid: 840 mL  Total IN: 1660.8 mL    OUT:    Voided (mL): 2150 mL  Total OUT: 2150 mL    Total NET: -489.2 mL    ============================ LABS =========================                        11.7   9.16  )-----------( 246      ( 14 May 2022 03:16 )             35.6     05-14    138  |  108  |  19  ----------------------------<  144<H>  4.1   |  20<L>  |  0.95    Ca    8.4      14 May 2022 03:16  Phos  3.3     05-14  Mg     2.2     05-14    TPro  5.4<L>  /  Alb  2.8<L>  /  TBili  0.7  /  DBili  x   /  AST  26  /  ALT  31  /  AlkPhos  108  05-14    LIVER FUNCTIONS - ( 14 May 2022 03:16 )  Alb: 2.8 g/dL / Pro: 5.4 g/dL / ALK PHOS: 108 U/L / ALT: 31 U/L / AST: 26 U/L / GGT: x           PTT - ( 14 May 2022 03:16 )  PTT:62.6 sec    ======================Micro/Rad/Cardio=================  Culture: Reviewed   CXR: Reviewed  Echo:Reviewed  ======================================================  PAST MEDICAL & SURGICAL HISTORY:  Depression  Constipation  Urinary retention  History of hernia repair  ====================ASSESSMENT ==============  76 yo M w/ history of HLD, bipolar disorder, anxiety and BPH coming in with exertional chest pain and dyspnea found to have 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    ====================== NEUROLOGY=====================  No active issues:  - AOx3, following commands  hx bipolar:  - continue home dose effexor/remeron    anxiety:  - klonipin 0.25mg TID PRN     clonazePAM Oral Disintegrating Tablet 0.25 milliGRAM(s) Oral three times a day PRN severe anxiety  mirtazapine 7.5 milliGRAM(s) Oral at bedtime  venlafaxine XR. 150 milliGRAM(s) Oral daily    ==================== RESPIRATORY======================  No active issues:  - stable on NC 96%    ====================CARDIOVASCULAR==================    ?STEMI  - RCA OCT w/ 70% stenosis of LA  - s/p IABP right femoral and right swanz d/c on 5/14/22  - Heparin GTT  - ASA and Plavix loaded  - continue with high intensity statin  - Nipride for AL reduction; wean as tolerated w/ 75mg hydralazine TID; MAP>80  - TTE: Severe segmental left ventricular systolic dysfunction w/ EF of 35%  - Viability: mid to distal anterior, mid to distal anteroseptal, apical, and inferior walls do not demonstrate significant viability.  The septum demonstrates mild viability. The remaining segments are viable (predominantly the lateral wall only)        hydrALAZINE 100 milliGRAM(s) Oral every 8 hours  isosorbide   dinitrate Tablet (ISORDIL) 10 milliGRAM(s) Oral three times a day  metoprolol tartrate 25 milliGRAM(s) Oral two times a day  nitroprusside Infusion 0.5 MICROgram(s)/kG/Min (6.94 mL/Hr) IV Continuous <Continuous>  tamsulosin 0.4 milliGRAM(s) Oral at bedtime    ===================HEMATOLOGIC/ONC ===================    No active issues  - continue to monitor H/H  - continue DAPT    aspirin enteric coated 81 milliGRAM(s) Oral daily  clopidogrel Tablet 75 milliGRAM(s) Oral daily    ===================== RENAL =========================  No active issues  - continue to monitor SCr  - continue to monitor UO    ==================== GASTROINTESTINAL===================  No active issues  - DASH diet  - no active issues  polyethylene glycol 3350 17 Gram(s) Oral daily  senna 2 Tablet(s) Oral at bedtime    =======================    ENDOCRINE  =====================  No active issues    atorvastatin 80 milliGRAM(s) Oral at bedtime    ========================INFECTIOUS DISEASE================      ========================PROPHYLACTIC MEASURE================  DVT  GI Protonix  Graft patency   Beta blocker      Patient requires continuous monitoring with bedside rhythm monitoring, arterial line, pulse oximetry, ventilator monitoring and intermittent blood gas analysis.  Care plan discussed with ICU care team.  Patient remained critical; required more than usual post op care; I have spent 35 minutes providing non-routine post op care, revaluated multiple times during the day.         ELYSE MALAVE  MRN-[id number]  Patient is a 77y old  Male who presents with a chief complaint of Cardiogenic Shock (13 May 2022 21:51)    HPI:  76 yo M w/ history of bipolar disorder, anxiety and BPH coming in as a transfer from [facility] for further cardiac intervention. Patient presented to the hospital for intermittent SOB on exertion for two weeks associated with nausea, palpitations and chest pressure that self resolved. Patient typically active; swimming 3x a week at baseline but notes worsened fatigue while swimming. Found to have abnormal EKG concerning for impending occlusion and was transferred for catheterization.   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  (11 May 2022 17:36)      Surgery/Hospital course:  5/13  LHC: pRCA 80%, mRCA 100% w/ , pLAD 80%, Diag 70%. RA 15, PA 50/14/17, PCWP 31, CI 1.7- IABP placed. LVEDP 38     5/13 TTE: Severe LV systolic dysfunction w/ EF 35%     5/13 Viability: mid-distal ant, mid-distal anterosept, apical, and inf walls no viability. Septum mild viability    Overnight events:  no overnight events, patient afebrile and hemodynamically stable , continued on nipride gtt    REVIEW OF SYSTEMS:    CONSTITUTIONAL: No weakness, fevers or chills  EYES/ENT: No visual changes;  No vertigo or throat pain   NECK: No pain or stiffness  RESPIRATORY: No cough, wheezing, hemoptysis; No shortness of breath  CARDIOVASCULAR: No chest pain or palpitations  GASTROINTESTINAL: No abdominal or epigastric pain. No nausea, vomiting, or hematemesis; No diarrhea or constipation. No melena or hematochezia.  GENITOURINARY: No dysuria, frequency or hematuria  NEUROLOGICAL: No numbness or weakness  SKIN: No itching, rashes    Physical Exam:  Vital Signs Last 24 Hrs  T(C): 36.6 (14 May 2022 07:00), Max: 37.4 (13 May 2022 23:00)  T(F): 97.9 (14 May 2022 07:00), Max: 99.3 (13 May 2022 23:00)  HR: 76 (14 May 2022 08:00) (76 - 101)  BP: --  BP(mean): --  RR: 22 (14 May 2022 08:00) (13 - 31)  SpO2: 96% (14 May 2022 08:00) (90% - 96%)  Physical Exam:   Constitutional: NAD, well-groomed, well-developed  HEENT: PERRLA, EOMI, no drainage or redness  Neck: supple,  No JVD  Respiratory: Breath Sounds equal & clear bilaterally to auscultation, no rales/rhonchi/wheezing, no accessory muscle use noted  Cardiovascular: Regular rate, regular rhythm, normal S1, S2; no murmurs or rub  Gastrointestinal: Soft, non-tender, non distended, + bowel sounds  Extremities: GARCIA x 4, no peripheral edema, no cyanosis, no clubbing   Neurological: A+O x 3; speech clear and intact; no sensory, motor  deficits, normal reflexes  Skin: warm, dry, well perfused  Incisions: R Femoral: IABP clean/dry/intact w/o surround erythema, non tender to active bleeding    ============================I/O===========================   I&O's Detail    13 May 2022 07:01  -  14 May 2022 07:00  --------------------------------------------------------  IN:    Heparin: 121.5 mL    Heparin: 144 mL    IV PiggyBack: 20 mL    Nitroprusside: 535.3 mL    Oral Fluid: 840 mL  Total IN: 1660.8 mL    OUT:    Voided (mL): 2150 mL  Total OUT: 2150 mL    Total NET: -489.2 mL    ============================ LABS =========================                        11.7   9.16  )-----------( 246      ( 14 May 2022 03:16 )             35.6     05-14    138  |  108  |  19  ----------------------------<  144<H>  4.1   |  20<L>  |  0.95    Ca    8.4      14 May 2022 03:16  Phos  3.3     05-14  Mg     2.2     05-14    TPro  5.4<L>  /  Alb  2.8<L>  /  TBili  0.7  /  DBili  x   /  AST  26  /  ALT  31  /  AlkPhos  108  05-14    LIVER FUNCTIONS - ( 14 May 2022 03:16 )  Alb: 2.8 g/dL / Pro: 5.4 g/dL / ALK PHOS: 108 U/L / ALT: 31 U/L / AST: 26 U/L / GGT: x           PTT - ( 14 May 2022 03:16 )  PTT:62.6 sec    ======================Micro/Rad/Cardio=================  Culture: Reviewed   CXR: Reviewed  Echo:Reviewed  ======================================================  PAST MEDICAL & SURGICAL HISTORY:  Depression  Constipation  Urinary retention  History of hernia repair  ====================ASSESSMENT ==============  76 yo M w/ history of HLD, bipolar disorder, anxiety and BPH coming in with exertional chest pain and dyspnea found to have 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    ====================== NEUROLOGY=====================  No active issues:  - AOx3, following commands    hx bipolar:  - continue home dose effexor    prolonged qtc 508:  - will hold remeron, monitor qtc    anxiety:  - klonipin 0.25mg TID PRN     clonazePAM Oral Disintegrating Tablet 0.25 milliGRAM(s) Oral three times a day PRN severe anxiety  mirtazapine 7.5 milliGRAM(s) Oral at bedtime  venlafaxine XR. 150 milliGRAM(s) Oral daily    ==================== RESPIRATORY======================  No active issues:  - stable on NC 96%    ====================CARDIOVASCULAR==================    ?STEMI  - RCA OCT w/ 70% stenosis of LA  - s/p IABP right femoral and right swanz d/c on 5/14/22.  - Heparin GTT  - ASA and Plavix loaded  - continue with high intensity statin  - weaned off Nipride for AL reduction;   - increased Lopressor from 25mg to 50mg BID and isordil 10mg to 25mg TID w/ 75mg hydralazine TID; MAP>80  - TTE: Severe segmental left ventricular systolic dysfunction w/ EF of 35%  - Viability: mid to distal anterior, mid to distal anteroseptal, apical, and inferior walls do not demonstrate significant viability.  The septum demonstrates mild viability. The remaining segments are viable (predominantly the lateral wall only)    hydrALAZINE 100 milliGRAM(s) Oral every 8 hours  isosorbide   dinitrate Tablet (ISORDIL) 10 milliGRAM(s) Oral three times a day  metoprolol tartrate 25 milliGRAM(s) Oral two times a day  nitroprusside Infusion 0.5 MICROgram(s)/kG/Min (6.94 mL/Hr) IV Continuous <Continuous>      ===================HEMATOLOGIC/ONC ===================    No active issues  - continue to monitor H/H  - continue DAPT    aspirin enteric coated 81 milliGRAM(s) Oral daily  clopidogrel Tablet 75 milliGRAM(s) Oral daily    ===================== RENAL =========================  No active issues  - continue to monitor SCr  - continue to monitor UO    ==================== GASTROINTESTINAL===================  No active issues  - DASH diet  - no active issues  - bowel regimen ppx    polyethylene glycol 3350 17 Gram(s) Oral daily  senna 2 Tablet(s) Oral at bedtime    ==================== GENITOURINE===================  hx BPH:  - continue home tamsulosin     tamsulosin 0.4 milliGRAM(s) Oral at bedtime    =======================    ENDOCRINE  =====================  No active issues    atorvastatin 80 milliGRAM(s) Oral at bedtime    ========================INFECTIOUS DISEASE================  No active issues    Patient requires continuous monitoring with bedside rhythm monitoring, arterial line, pulse oximetry, ventilator monitoring and intermittent blood gas analysis.  Care plan discussed with ICU care team.  Patient remained critical; required more than usual post op care; I have spent 35 minutes providing non-routine post op care, revaluated multiple times during the day.         ELYSE MALAVE  MRN-[id number]  Patient is a 77y old  Male who presents with a chief complaint of Cardiogenic Shock (13 May 2022 21:51)    HPI:  78 yo M w/ history of bipolar disorder, anxiety and BPH coming in as a transfer from [facility] for further cardiac intervention. Patient presented to the hospital for intermittent SOB on exertion for two weeks associated with nausea, palpitations and chest pressure that self resolved. Patient typically active; swimming 3x a week at baseline but notes worsened fatigue while swimming. Found to have abnormal EKG concerning for impending occlusion and was transferred for catheterization.   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  (11 May 2022 17:36)      Surgery/Hospital course:  5/13  LHC: pRCA 80%, mRCA 100% w/ , pLAD 80%, Diag 70%. RA 15, PA 50/14/17, PCWP 31, CI 1.7- IABP placed. LVEDP 38     5/13 TTE: Severe LV systolic dysfunction w/ EF 35%     5/13 Viability: mid-distal ant, mid-distal anterosept, apical, and inf walls no viability. Septum mild viability    Overnight events:  no overnight events, patient afebrile and hemodynamically stable , continued on nipride gtt    REVIEW OF SYSTEMS:    CONSTITUTIONAL: No weakness, fevers or chills  EYES/ENT: No visual changes;  No vertigo or throat pain   NECK: No pain or stiffness  RESPIRATORY: No cough, wheezing, hemoptysis; No shortness of breath  CARDIOVASCULAR: No chest pain or palpitations  GASTROINTESTINAL: No abdominal or epigastric pain. No nausea, vomiting, or hematemesis; No diarrhea or constipation. No melena or hematochezia.  GENITOURINARY: No dysuria, frequency or hematuria  NEUROLOGICAL: No numbness or weakness  SKIN: No itching, rashes    Physical Exam:  Vital Signs Last 24 Hrs  T(C): 36.6 (14 May 2022 07:00), Max: 37.4 (13 May 2022 23:00)  T(F): 97.9 (14 May 2022 07:00), Max: 99.3 (13 May 2022 23:00)  HR: 76 (14 May 2022 08:00) (76 - 101)  BP: --  BP(mean): --  RR: 22 (14 May 2022 08:00) (13 - 31)  SpO2: 96% (14 May 2022 08:00) (90% - 96%)  Physical Exam:   Constitutional: NAD, well-groomed, well-developed  HEENT: PERRLA, EOMI, no drainage or redness  Neck: supple,  No JVD  Respiratory: Breath Sounds equal & clear bilaterally to auscultation, no rales/rhonchi/wheezing, no accessory muscle use noted  Cardiovascular: Regular rate, regular rhythm, normal S1, S2; no murmurs or rub  Gastrointestinal: Soft, non-tender, non distended, + bowel sounds  Extremities: GARCIA x 4, no peripheral edema, no cyanosis, no clubbing   Neurological: A+O x 3; speech clear and intact; no sensory, motor  deficits, normal reflexes  Skin: warm, dry, well perfused  Incisions: R Femoral: IABP clean/dry/intact w/o surround erythema, non tender to active bleeding    ============================I/O===========================   I&O's Detail    13 May 2022 07:01  -  14 May 2022 07:00  --------------------------------------------------------  IN:    Heparin: 121.5 mL    Heparin: 144 mL    IV PiggyBack: 20 mL    Nitroprusside: 535.3 mL    Oral Fluid: 840 mL  Total IN: 1660.8 mL    OUT:    Voided (mL): 2150 mL  Total OUT: 2150 mL    Total NET: -489.2 mL    ============================ LABS =========================                        11.7   9.16  )-----------( 246      ( 14 May 2022 03:16 )             35.6     05-14    138  |  108  |  19  ----------------------------<  144<H>  4.1   |  20<L>  |  0.95    Ca    8.4      14 May 2022 03:16  Phos  3.3     05-14  Mg     2.2     05-14    TPro  5.4<L>  /  Alb  2.8<L>  /  TBili  0.7  /  DBili  x   /  AST  26  /  ALT  31  /  AlkPhos  108  05-14    LIVER FUNCTIONS - ( 14 May 2022 03:16 )  Alb: 2.8 g/dL / Pro: 5.4 g/dL / ALK PHOS: 108 U/L / ALT: 31 U/L / AST: 26 U/L / GGT: x           PTT - ( 14 May 2022 03:16 )  PTT:62.6 sec    ======================Micro/Rad/Cardio=================  Culture: Reviewed   CXR: Reviewed  Echo:Reviewed  ======================================================  PAST MEDICAL & SURGICAL HISTORY:  Depression  Constipation  Urinary retention  History of hernia repair  ====================ASSESSMENT ==============  78 yo M w/ history of HLD, bipolar disorder, anxiety and BPH coming in with exertional chest pain and dyspnea found to have 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    ====================== NEUROLOGY=====================  No active issues:  - AOx3, following commands    hx bipolar:  - continue home dose effexor  - prolonged qtc 508; will hold remeron, monitor qtc    anxiety:  - klonipin 0.25mg TID PRN     clonazePAM Oral Disintegrating Tablet 0.25 milliGRAM(s) Oral three times a day PRN severe anxiety  mirtazapine 7.5 milliGRAM(s) Oral at bedtime  venlafaxine XR. 150 milliGRAM(s) Oral daily    ==================== RESPIRATORY======================  No active issues:  - stable on NC 96%    ====================CARDIOVASCULAR==================    ?STEMI  - RCA OCT w/ 70% stenosis of LA  - s/p IABP right femoral and right swanz d/c on 5/14/22.  - Heparin GTT  - ASA and Plavix loaded  - continue with high intensity statin  - weaned off Nipride for AL reduction;   - increased Lopressor from 25mg to 50mg BID and isordil 10mg to 25mg TID w/ 75mg hydralazine TID; MAP>80  - TTE: Severe segmental left ventricular systolic dysfunction w/ EF of 35%  - Viability: mid to distal anterior, mid to distal anteroseptal, apical, and inferior walls do not demonstrate significant viability.  The septum demonstrates mild viability. The remaining segments are viable (predominantly the lateral wall only)    hydrALAZINE 100 milliGRAM(s) Oral every 8 hours  isosorbide   dinitrate Tablet (ISORDIL) 10 milliGRAM(s) Oral three times a day  metoprolol tartrate 25 milliGRAM(s) Oral two times a day  nitroprusside Infusion 0.5 MICROgram(s)/kG/Min (6.94 mL/Hr) IV Continuous <Continuous>      ===================HEMATOLOGIC/ONC ===================    No active issues  - continue to monitor H/H  - continue DAPT    aspirin enteric coated 81 milliGRAM(s) Oral daily  clopidogrel Tablet 75 milliGRAM(s) Oral daily    ===================== RENAL =========================  No active issues  - continue to monitor SCr  - continue to monitor UO    ==================== GASTROINTESTINAL===================  No active issues  - DASH diet  - no active issues  - bowel regimen ppx    polyethylene glycol 3350 17 Gram(s) Oral daily  senna 2 Tablet(s) Oral at bedtime    ==================== GENITOURINE===================  hx BPH:  - continue home tamsulosin     tamsulosin 0.4 milliGRAM(s) Oral at bedtime    =======================    ENDOCRINE  =====================  No active issues    atorvastatin 80 milliGRAM(s) Oral at bedtime    ========================INFECTIOUS DISEASE================  No active issues    Patient requires continuous monitoring with bedside rhythm monitoring, arterial line, pulse oximetry, ventilator monitoring and intermittent blood gas analysis.  Care plan discussed with ICU care team.  Patient remained critical; required more than usual post op care; I have spent 35 minutes providing non-routine post op care, revaluated multiple times during the day.

## 2022-05-14 NOTE — CHART NOTE - NSCHARTNOTEFT_GEN_A_CORE
CICU Transfer Note    Transfer from: CICU    Transfer to: (  ) Medicine    (  ) Telemetry     (   ) RCU        (    ) Palliative         (   ) Stroke Unit       (  ) MICU   (   ) __________________    Accepting Physician:    Team (MAR) or SARAH service:     Signout given to:     CCU COURSE:      PAST MEDICAL & SURGICAL HISTORY:  Depression      Constipation      Urinary retention      History of hernia repair          Vital Signs Last 24 Hrs  T(C): 36.6 (14 May 2022 12:00), Max: 37.4 (13 May 2022 23:00)  T(F): 97.9 (14 May 2022 12:00), Max: 99.3 (13 May 2022 23:00)  HR: 88 (14 May 2022 16:00) (63 - 101)  BP: 117/62 (14 May 2022 16:00) (84/52 - 129/76)  BP(mean): 83 (14 May 2022 16:00) (63 - 97)  RR: 17 (14 May 2022 16:00) (12 - 226)  SpO2: 95% (14 May 2022 16:00) (90% - 97%)  I&O's Summary    13 May 2022 07:01  -  14 May 2022 07:00  --------------------------------------------------------  IN: 1660.8 mL / OUT: 2150 mL / NET: -489.2 mL    14 May 2022 07:01  -  14 May 2022 16:34  --------------------------------------------------------  IN: 340.6 mL / OUT: 0 mL / NET: 340.6 mL      Allergies    No Known Allergies    Intolerances      MEDICATIONS  (STANDING):  aspirin enteric coated 81 milliGRAM(s) Oral daily  atorvastatin 80 milliGRAM(s) Oral at bedtime  chlorhexidine 4% Liquid 1 Application(s) Topical <User Schedule>  clopidogrel Tablet 75 milliGRAM(s) Oral daily  hydrALAZINE 100 milliGRAM(s) Oral every 8 hours  isosorbide   dinitrate Tablet (ISORDIL) 25 milliGRAM(s) Oral three times a day  metoprolol tartrate 50 milliGRAM(s) Oral two times a day  polyethylene glycol 3350 17 Gram(s) Oral daily  senna 2 Tablet(s) Oral at bedtime  tamsulosin 0.4 milliGRAM(s) Oral at bedtime  venlafaxine XR. 150 milliGRAM(s) Oral daily    MEDICATIONS  (PRN):  clonazePAM Oral Disintegrating Tablet 0.25 milliGRAM(s) Oral three times a day PRN severe anxiety      Adult Advanced Hemodynamics Last 24 Hrs  CVP(mm Hg): -47 (14 May 2022 10:30) (-47 - 20)  CVP(cm H2O): --  CO: --  CI: --  PA: 50/27 (14 May 2022 10:00) (36/15 - 56/29)  PA(mean): 35 (14 May 2022 10:00) (23 - 39)  PCWP: --  SVR: --  SVRI: --  PVR: --  PVRI: --                              11.7   9.16  )-----------( 246      ( 14 May 2022 03:16 )             35.6     05-14    138  |  108  |  19  ----------------------------<  144<H>  4.1   |  20<L>  |  0.95    Ca    8.4      14 May 2022 03:16  Phos  3.3     05-14  Mg     2.2     05-14    TPro  5.4<L>  /  Alb  2.8<L>  /  TBili  0.7  /  DBili  x   /  AST  26  /  ALT  31  /  AlkPhos  108  05-14    PTT - ( 14 May 2022 03:16 )  PTT:62.6 sec  PHYSICAL EXAM:      Constitutional:    Eyes:    ENMT:    Neck:    Breasts:    Back:    Respiratory:    Cardiovascular:    Gastrointestinal:    Genitourinary:    Rectal:    Extremities:    Vascular:    Neurological:    Skin:    Lymph Nodes:    Musculoskeletal:    Psychiatric:        Lactate:    Ekg:  Telemetry:  Echo:  Cardiac Cath:  Stress Test:  Imaging:    ASSESSMENT & PLAN:             FOR FOLLOW UP: CICU Transfer Note    Transfer from: CICU    Transfer to: ( x ) Medicine    (  ) Telemetry     (   ) RCU        (    ) Palliative         (   ) Stroke Unit       (  ) MICU   (   ) __________________    Accepting Physician: Dr. Calvin Landry    Team (MAR) or ACP service:     Signout given to:  Dr. Calvin Landry    HPI:  HPI:  76 yo M w/ history of bipolar disorder, anxiety and BPH coming in as a transfer from [facility] for further cardiac intervention. Patient presented to the hospital for intermittent SOB on exertion x 2 weeks associated with nausea, palpitations and chest pressure that self resolved. Patient typically active; swimming 3x a week at baseline but notes worsened fatigue while swimming. Found to have abnormal EKG concerning for impending occlusion and was transferred for catheterization.     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  (11 May 2022 17:36)    CCU COURSE:      PAST MEDICAL & SURGICAL HISTORY:  Depression  Constipation  Urinary retention  History of hernia repair    Vital Signs Last 24 Hrs  T(C): 36.6 (14 May 2022 12:00), Max: 37.4 (13 May 2022 23:00)  T(F): 97.9 (14 May 2022 12:00), Max: 99.3 (13 May 2022 23:00)  HR: 88 (14 May 2022 16:00) (63 - 101)  BP: 117/62 (14 May 2022 16:00) (84/52 - 129/76)  BP(mean): 83 (14 May 2022 16:00) (63 - 97)  RR: 17 (14 May 2022 16:00) (12 - 226)  SpO2: 95% (14 May 2022 16:00) (90% - 97%)  I&O's Summary    13 May 2022 07:01  -  14 May 2022 07:00  --------------------------------------------------------  IN: 1660.8 mL / OUT: 2150 mL / NET: -489.2 mL    14 May 2022 07:01  -  14 May 2022 16:34  --------------------------------------------------------  IN: 340.6 mL / OUT: 0 mL / NET: 340.6 mL      Allergies    No Known Allergies    MEDICATIONS  (STANDING):  aspirin enteric coated 81 milliGRAM(s) Oral daily  atorvastatin 80 milliGRAM(s) Oral at bedtime  chlorhexidine 4% Liquid 1 Application(s) Topical <User Schedule>  clopidogrel Tablet 75 milliGRAM(s) Oral daily  hydrALAZINE 100 milliGRAM(s) Oral every 8 hours  isosorbide   dinitrate Tablet (ISORDIL) 25 milliGRAM(s) Oral three times a day  metoprolol tartrate 50 milliGRAM(s) Oral two times a day  polyethylene glycol 3350 17 Gram(s) Oral daily  senna 2 Tablet(s) Oral at bedtime  tamsulosin 0.4 milliGRAM(s) Oral at bedtime  venlafaxine XR. 150 milliGRAM(s) Oral daily    MEDICATIONS  (PRN):  clonazePAM Oral Disintegrating Tablet 0.25 milliGRAM(s) Oral three times a day PRN severe anxiety      Adult Advanced Hemodynamics Last 24 Hrs  CVP(mm Hg): -47 (14 May 2022 10:30) (-47 - 20)  CVP(cm H2O): --  CO: --  CI: --  PA: 50/27 (14 May 2022 10:00) (36/15 - 56/29)  PA(mean): 35 (14 May 2022 10:00) (23 - 39)                          11.7   9.16  )-----------( 246      ( 14 May 2022 03:16 )             35.6     05-14    138  |  108  |  19  ----------------------------<  144<H>  4.1   |  20<L>  |  0.95    Ca    8.4      14 May 2022 03:16  Phos  3.3     05-14  Mg     2.2     05-14    TPro  5.4<L>  /  Alb  2.8<L>  /  TBili  0.7  /  DBili  x   /  AST  26  /  ALT  31  /  AlkPhos  108  05-14    PTT - ( 14 May 2022 03:16 )  PTT:62.6 sec  PHYSICAL EXAM:            Lactate:    5/13  LHC: pRCA 80%, mRCA 100% w/ , pLAD 80%, Diag 70%. RA 15, PA 50/14/17, PCWP 31, CI 1.7- IABP placed. LVEDP 38   5/13 TTE: Severe LV systolic dysfunction w/ EF 35%   5/13 Viability: mid-distal ant, mid-distal anterosept, apical, and inf walls no viability. Septum mild viability    ASSESSMENT & PLAN:             FOR FOLLOW UP: CICU Transfer Note    Transfer from: CICU    Transfer to: ( x ) Medicine    (  ) Telemetry     (   ) RCU        (    ) Palliative         (   ) Stroke Unit       (  ) MICU   (   ) __________________    Accepting Physician: Dr. Calvin Landry    Team (MAR) or ACP service:     Signout given to:  Dr. Calvin Landry    HPI:  HPI:  76 yo M w/ history of bipolar disorder, anxiety and BPH coming in as a transfer from [facility] for further cardiac intervention. Patient presented to the hospital for intermittent SOB on exertion x 2 weeks associated with nausea, palpitations and chest pressure that self resolved. Patient typically active; swimming 3x a week at baseline but notes worsened fatigue while swimming. Found to have abnormal EKG concerning for impending occlusion and was transferred for catheterization.     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  (11 May 2022 17:36)    CCU COURSE:      PAST MEDICAL & SURGICAL HISTORY:  Depression  Constipation  Urinary retention  History of hernia repair    Vital Signs Last 24 Hrs  T(C): 36.6 (14 May 2022 12:00), Max: 37.4 (13 May 2022 23:00)  T(F): 97.9 (14 May 2022 12:00), Max: 99.3 (13 May 2022 23:00)  HR: 88 (14 May 2022 16:00) (63 - 101)  BP: 117/62 (14 May 2022 16:00) (84/52 - 129/76)  BP(mean): 83 (14 May 2022 16:00) (63 - 97)  RR: 17 (14 May 2022 16:00) (12 - 226)  SpO2: 95% (14 May 2022 16:00) (90% - 97%)  I&O's Summary    13 May 2022 07:01  -  14 May 2022 07:00  --------------------------------------------------------  IN: 1660.8 mL / OUT: 2150 mL / NET: -489.2 mL    14 May 2022 07:01  -  14 May 2022 16:34  --------------------------------------------------------  IN: 340.6 mL / OUT: 0 mL / NET: 340.6 mL      Allergies    No Known Allergies    MEDICATIONS  (STANDING):  aspirin enteric coated 81 milliGRAM(s) Oral daily  atorvastatin 80 milliGRAM(s) Oral at bedtime  chlorhexidine 4% Liquid 1 Application(s) Topical <User Schedule>  clopidogrel Tablet 75 milliGRAM(s) Oral daily  hydrALAZINE 100 milliGRAM(s) Oral every 8 hours  isosorbide   dinitrate Tablet (ISORDIL) 25 milliGRAM(s) Oral three times a day  metoprolol tartrate 50 milliGRAM(s) Oral two times a day  polyethylene glycol 3350 17 Gram(s) Oral daily  senna 2 Tablet(s) Oral at bedtime  tamsulosin 0.4 milliGRAM(s) Oral at bedtime  venlafaxine XR. 150 milliGRAM(s) Oral daily    MEDICATIONS  (PRN):  clonazePAM Oral Disintegrating Tablet 0.25 milliGRAM(s) Oral three times a day PRN severe anxiety      Adult Advanced Hemodynamics Last 24 Hrs  CVP(mm Hg): -47 (14 May 2022 10:30) (-47 - 20)  CVP(cm H2O): --  CO: --  CI: --  PA: 50/27 (14 May 2022 10:00) (36/15 - 56/29)  PA(mean): 35 (14 May 2022 10:00) (23 - 39)                          11.7   9.16  )-----------( 246      ( 14 May 2022 03:16 )             35.6     05-14    138  |  108  |  19  ----------------------------<  144<H>  4.1   |  20<L>  |  0.95    Ca    8.4      14 May 2022 03:16  Phos  3.3     05-14  Mg     2.2     05-14    TPro  5.4<L>  /  Alb  2.8<L>  /  TBili  0.7  /  DBili  x   /  AST  26  /  ALT  31  /  AlkPhos  108  05-14    PTT - ( 14 May 2022 03:16 )  PTT:62.6 sec  PHYSICAL EXAM:    Constitutional: NAD, well-groomed, well-developed  HEENT: PERRLA, EOMI, no drainage or redness  Neck: supple,  No JVD  Respiratory: Breath Sounds equal & clear bilaterally to auscultation, no rales/rhonchi/wheezing, no accessory muscle use noted  Cardiovascular: Regular rate, regular rhythm, normal S1, S2; no murmurs or rub  Gastrointestinal: Soft, non-tender, non distended, + bowel sounds  Extremities: GARCIA x 4, no peripheral edema, no cyanosis, no clubbing   Neurological: A+O x 3; speech clear and intact; no sensory, motor  deficits, normal reflexes  Skin: warm, dry, well perfused  Incisions: R Femoral: IABP clean/dry/intact w/o surround erythema, non tender to active bleeding    Imagin/13  LHC: pRCA 80%, mRCA 100% w/ , pLAD 80%, Diag 70%. RA 15, PA 50//17, PCWP 31, CI 1.7- IABP placed. LVEDP 38    TTE: Severe LV systolic dysfunction w/ EF 35%    Viability: mid-distal ant, mid-distal anterosept, apical, and inf walls no viability. 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    ====================== NEUROLOGY=====================  No active issues:  - AOx3, following commands    hx bipolar:  - continue home dose effexor  - prolonged qtc 508; will hold remeron, monitor qtc    anxiety:  - klonipin 0.25mg TID PRN     clonazePAM Oral Disintegrating Tablet 0.25 milliGRAM(s) Oral three times a day PRN severe anxiety  mirtazapine 7.5 milliGRAM(s) Oral at bedtime  venlafaxine XR. 150 milliGRAM(s) Oral daily    ==================== RESPIRATORY======================  No active issues:  - stable on NC 96%    ====================CARDIOVASCULAR==================    STEMI  - RCA OCT w/ 70% stenosis of LA  - s/p IABP right femoral and right swanz d/c on 22.  - ASA and Plavix loaded- continue DAPT  - continue high intensity statin  - weaned off Nipride for AL reduction;   - increased Lopressor from 25mg to 50mg BID and isordil 10mg to 25mg TID w/ 75mg hydralazine TID; MAP>80  - TTE: Severe segmental left ventricular systolic dysfunction w/ EF of 35%  - Viability: mid to distal anterior, mid to distal anteroseptal, apical, and inferior walls do not demonstrate significant viability.  The septum demonstrates mild viability. The remaining segments are viable (predominantly the lateral wall only)    hydrALAZINE 100 milliGRAM(s) Oral every 8 hours  isosorbide   dinitrate Tablet (ISORDIL) 10 milliGRAM(s) Oral three times a day  metoprolol tartrate 25 milliGRAM(s) Oral two times a day    ===================HEMATOLOGIC/ONC ===================    No active issues  - continue to monitor H/H  - continue DAPT    aspirin enteric coated 81 milliGRAM(s) Oral daily  clopidogrel Tablet 75 milliGRAM(s) Oral daily    ===================== RENAL =========================  No active issues  - continue to monitor SCr  - continue to monitor UO    ==================== GASTROINTESTINAL===================  No active issues  - DASH diet  - no active issues  - bowel regimen ppx    polyethylene glycol 3350 17 Gram(s) Oral daily  senna 2 Tablet(s) Oral at bedtime    ==================== GENITOURINARY===================  hx BPH:  - continue home tamsulosin     tamsulosin 0.4 milliGRAM(s) Oral at bedtime    =======================    ENDOCRINE  =====================  No active issues    atorvastatin 80 milliGRAM(s) Oral at bedtime    ========================INFECTIOUS DISEASE================  No active issues      FOR FOLLOW UP:  - Continue GDMT as tolerated/ medical management in setting of non-viability.  - Monitor qtc; prolonged to 508; Remeron placed on hold, restart when able CICU Transfer Note    Transfer from: CICU    Transfer to: ( x ) Medicine    (  ) Telemetry     (   ) RCU        (    ) Palliative         (   ) Stroke Unit       (  ) MICU   (   ) __________________    Accepting Physician: Dr. Calvin Landry    Team (MAR) or ACP service: MAR    Signout given to:  Dr. Calvin Landry    HPI:  HPI:  76 yo M w/ history of bipolar disorder, anxiety and BPH coming in as a transfer from [facility] for further cardiac intervention. Patient presented to the hospital for intermittent SOB on exertion x 2 weeks associated with nausea, palpitations and chest pressure that self resolved. Patient typically active; swimming 3x a week at baseline but notes worsened fatigue while swimming. Found to have abnormal EKG concerning for impending occlusion and was transferred for catheterization.     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  (11 May 2022 17:36)    CCU COURSE:      PAST MEDICAL & SURGICAL HISTORY:  Depression  Constipation  Urinary retention  History of hernia repair    Vital Signs Last 24 Hrs  T(C): 36.6 (14 May 2022 12:00), Max: 37.4 (13 May 2022 23:00)  T(F): 97.9 (14 May 2022 12:00), Max: 99.3 (13 May 2022 23:00)  HR: 88 (14 May 2022 16:00) (63 - 101)  BP: 117/62 (14 May 2022 16:00) (84/52 - 129/76)  BP(mean): 83 (14 May 2022 16:00) (63 - 97)  RR: 17 (14 May 2022 16:00) (12 - 226)  SpO2: 95% (14 May 2022 16:00) (90% - 97%)  I&O's Summary    13 May 2022 07:01  -  14 May 2022 07:00  --------------------------------------------------------  IN: 1660.8 mL / OUT: 2150 mL / NET: -489.2 mL    14 May 2022 07:01  -  14 May 2022 16:34  --------------------------------------------------------  IN: 340.6 mL / OUT: 0 mL / NET: 340.6 mL      Allergies    No Known Allergies    MEDICATIONS  (STANDING):  aspirin enteric coated 81 milliGRAM(s) Oral daily  atorvastatin 80 milliGRAM(s) Oral at bedtime  chlorhexidine 4% Liquid 1 Application(s) Topical <User Schedule>  clopidogrel Tablet 75 milliGRAM(s) Oral daily  hydrALAZINE 100 milliGRAM(s) Oral every 8 hours  isosorbide   dinitrate Tablet (ISORDIL) 25 milliGRAM(s) Oral three times a day  metoprolol tartrate 50 milliGRAM(s) Oral two times a day  polyethylene glycol 3350 17 Gram(s) Oral daily  senna 2 Tablet(s) Oral at bedtime  tamsulosin 0.4 milliGRAM(s) Oral at bedtime  venlafaxine XR. 150 milliGRAM(s) Oral daily    MEDICATIONS  (PRN):  clonazePAM Oral Disintegrating Tablet 0.25 milliGRAM(s) Oral three times a day PRN severe anxiety      Adult Advanced Hemodynamics Last 24 Hrs  CVP(mm Hg): -47 (14 May 2022 10:30) (-47 - 20)  CVP(cm H2O): --  CO: --  CI: --  PA: 50/27 (14 May 2022 10:00) (36/15 - 56/29)  PA(mean): 35 (14 May 2022 10:00) (23 - 39)                          11.7   9.16  )-----------( 246      ( 14 May 2022 03:16 )             35.6     05-14    138  |  108  |  19  ----------------------------<  144<H>  4.1   |  20<L>  |  0.95    Ca    8.4      14 May 2022 03:16  Phos  3.3     05-14  Mg     2.2     05-14    TPro  5.4<L>  /  Alb  2.8<L>  /  TBili  0.7  /  DBili  x   /  AST  26  /  ALT  31  /  AlkPhos  108  05-14    PTT - ( 14 May 2022 03:16 )  PTT:62.6 sec  PHYSICAL EXAM:    Constitutional: NAD, well-groomed, well-developed  HEENT: PERRLA, EOMI, no drainage or redness  Neck: supple,  No JVD  Respiratory: Breath Sounds equal & clear bilaterally to auscultation, no rales/rhonchi/wheezing, no accessory muscle use noted  Cardiovascular: Regular rate, regular rhythm, normal S1, S2; no murmurs or rub  Gastrointestinal: Soft, non-tender, non distended, + bowel sounds  Extremities: GARCIA x 4, no peripheral edema, no cyanosis, no clubbing   Neurological: A+O x 3; speech clear and intact; no sensory, motor  deficits, normal reflexes  Skin: warm, dry, well perfused  Incisions: R Femoral: IABP clean/dry/intact w/o surround erythema, non tender to active bleeding    Imagin/13  LHC: pRCA 80%, mRCA 100% w/ , pLAD 80%, Diag 70%. RA 15, PA 50//, PCWP 31, CI 1.7- IABP placed. LVEDP 38    TTE: Severe LV systolic dysfunction w/ EF 35%    Viability: mid-distal ant, mid-distal anterosept, apical, and inf walls no viability. 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    ====================== NEUROLOGY=====================  No active issues:  - AOx3, following commands    hx bipolar:  - continue home dose effexor  - prolonged qtc 508; will hold remeron, monitor qtc    anxiety:  - klonipin 0.25mg TID PRN     clonazePAM Oral Disintegrating Tablet 0.25 milliGRAM(s) Oral three times a day PRN severe anxiety  mirtazapine 7.5 milliGRAM(s) Oral at bedtime  venlafaxine XR. 150 milliGRAM(s) Oral daily    ==================== RESPIRATORY======================  No active issues:  - stable on NC 96%    ====================CARDIOVASCULAR==================    STEMI  - RCA OCT w/ 70% stenosis of LA  - s/p IABP right femoral and right swanz d/c on 22.  - ASA and Plavix loaded- continue DAPT  - continue high intensity statin  - weaned off Nipride for AL reduction;   - increased Lopressor from 25mg to 50mg BID and isordil 10mg to 25mg TID w/ 75mg hydralazine TID; MAP>80  - TTE: Severe segmental left ventricular systolic dysfunction w/ EF of 35%  - Viability: mid to distal anterior, mid to distal anteroseptal, apical, and inferior walls do not demonstrate significant viability.  The septum demonstrates mild viability. The remaining segments are viable (predominantly the lateral wall only)    hydrALAZINE 100 milliGRAM(s) Oral every 8 hours  isosorbide   dinitrate Tablet (ISORDIL) 10 milliGRAM(s) Oral three times a day  metoprolol tartrate 25 milliGRAM(s) Oral two times a day    ===================HEMATOLOGIC/ONC ===================    No active issues  - continue to monitor H/H  - continue DAPT    aspirin enteric coated 81 milliGRAM(s) Oral daily  clopidogrel Tablet 75 milliGRAM(s) Oral daily    ===================== RENAL =========================  No active issues  - continue to monitor SCr  - continue to monitor UO    ==================== GASTROINTESTINAL===================  No active issues  - DASH diet  - no active issues  - bowel regimen ppx    polyethylene glycol 3350 17 Gram(s) Oral daily  senna 2 Tablet(s) Oral at bedtime    ==================== GENITOURINARY===================  hx BPH:  - continue home tamsulosin     tamsulosin 0.4 milliGRAM(s) Oral at bedtime    =======================    ENDOCRINE  =====================  No active issues    atorvastatin 80 milliGRAM(s) Oral at bedtime    ========================INFECTIOUS DISEASE================  No active issues      FOR FOLLOW UP:  - Continue GDMT as tolerated/ medical management in setting of non-viability.  - Monitor qtc; prolonged to 508; Remeron placed on hold, restart when able

## 2022-05-14 NOTE — CHART NOTE - NSCHARTNOTEFT_GEN_A_CORE
MAR Accept Note  Transfer to:  Medicine/ Tele   Accepting Attending Physician:  Dr. Calvin Landry  Assigned Room:  [address] [place] [address]     HPI/MICU COURSE:   Please refer to CICU transfer note for full details. Briefly, this is a 78 yo M w/ history of bipolar disorder, anxiety and BPH coming in as a transfer from [facility] for further cardiac intervention. Patient presented to the hospital for intermittent SOB on exertion x 2 weeks associated with nausea, palpitations and chest pressure that self resolved. Patient typically active; swimming 3x a week at baseline but notes worsened fatigue while swimming. Found to have abnormal EKG concerning for impending occlusion and was transferred for catheterization.     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 s/p IABP right femoral and right swanz d/c on 5/14/22.    5/13  LHC: pRCA 80%, mRCA 100% w/ , pLAD 80%, Diag 70%. RA 15, PA 50/14/17, PCWP 31, CI 1.7- IABP placed. LVEDP 38   5/13 TTE: Severe LV systolic dysfunction w/ EF 35%   5/13 Viability: mid to distal anterior, mid to distal anteroseptal, apical, and inferior walls do not demonstrate significant viability.  The septum demonstrates mild viability. The remaining segments are viable (predominantly the lateral wall only)  5/14: IPAP removed.     FOR FOLLOW-UP: MAR Accept Note  Transfer to:  Medicine/ Tele   Accepting Attending Physician:  Dr. Calvin Landry  Assigned Room:  [address] [place] [address]     HPI/MICU COURSE:   Please refer to CICU transfer note for full details. Briefly, this is a 76 yo M w/ history of bipolar disorder, anxiety and BPH coming in as a transfer from [facility] for further cardiac intervention. Patient presented to the hospital for intermittent SOB on exertion x 2 weeks associated with nausea, palpitations and chest pressure that self resolved. Patient typically active; swimming 3x a week at baseline but notes worsened fatigue while swimming. Found to have abnormal EKG concerning for impending occlusion and was transferred for catheterization.     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 s/p IABP right femoral and right swanz d/c on 5/14/22.    5/13  LHC: pRCA 80%, mRCA 100% w/ , pLAD 80%, Diag 70%. RA 15, PA 50/14/17, PCWP 31, CI 1.7- IABP placed. LVEDP 38   5/13 TTE: Severe LV systolic dysfunction w/ EF 35%   5/13 Viability: mid to distal anterior, mid to distal anteroseptal, apical, and inferior walls do not demonstrate significant viability.  The septum demonstrates mild viability. The remaining segments are viable (predominantly the lateral wall only)  5/14:  s/p IABP right femoral and right swanz d/c on 5/14/22.    FOR FOLLOW-UP:  - Continue GDMT as tolerated/ medical management in setting of non-viability.  - Monitor qtc; prolonged to 508; Remeron placed on hold, restart when able.  - May need life vest MAR Accept Note  Transfer to:  Medicine/ Tele   Accepting Attending Physician:  Dr. Calvin Landry  Assigned Room:  [address] [place] [address]     Assessed patient at bedside, patient hemodynamically stable, alert, oriented x3, normal respiratory efforts, lungs clear to ausculation bilaterally, CV exam regular rate rhythm, no murmur, no peripheral edema.   HPI/MICU COURSE:   Please refer to CICU transfer note for full details. Briefly, this is a 76 yo M w/ history of bipolar disorder, anxiety and BPH coming in as a transfer from [facility] for further cardiac intervention. Patient presented to the hospital for intermittent SOB on exertion x 2 weeks associated with nausea, palpitations and chest pressure that self resolved. Patient typically active; swimming 3x a week at baseline but notes worsened fatigue while swimming. Found to have abnormal EKG concerning for impending occlusion and was transferred for catheterization.     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 s/p IABP right femoral and right swanz d/c on 5/14/22.    5/13  LHC: pRCA 80%, mRCA 100% w/ , pLAD 80%, Diag 70%. RA 15, PA 50/14/17, PCWP 31, CI 1.7- IABP placed. LVEDP 38   5/13 TTE: Severe LV systolic dysfunction w/ EF 35%   5/13 Viability: mid to distal anterior, mid to distal anteroseptal, apical, and inferior walls do not demonstrate significant viability.  The septum demonstrates mild viability. The remaining segments are viable (predominantly the lateral wall only)  5/14:  s/p IABP right femoral and right swanz d/c on 5/14/22.    FOR FOLLOW-UP:  - Continue GDMT as tolerated/ medical management in setting of non-viability.  - Monitor qtc; prolonged to 508; Remeron placed on hold, restart when able.  - May need life vest

## 2022-05-15 DIAGNOSIS — Z29.9 ENCOUNTER FOR PROPHYLACTIC MEASURES, UNSPECIFIED: ICD-10-CM

## 2022-05-15 DIAGNOSIS — N40.0 BENIGN PROSTATIC HYPERPLASIA WITHOUT LOWER URINARY TRACT SYMPTOMS: ICD-10-CM

## 2022-05-15 DIAGNOSIS — I21.19 ST ELEVATION (STEMI) MYOCARDIAL INFARCTION INVOLVING OTHER CORONARY ARTERY OF INFERIOR WALL: ICD-10-CM

## 2022-05-15 DIAGNOSIS — E78.5 HYPERLIPIDEMIA, UNSPECIFIED: ICD-10-CM

## 2022-05-15 DIAGNOSIS — F31.9 BIPOLAR DISORDER, UNSPECIFIED: ICD-10-CM

## 2022-05-15 PROCEDURE — 99233 SBSQ HOSP IP/OBS HIGH 50: CPT | Mod: GC

## 2022-05-15 PROCEDURE — 93010 ELECTROCARDIOGRAM REPORT: CPT

## 2022-05-15 RX ORDER — MIRTAZAPINE 45 MG/1
7.5 TABLET, ORALLY DISINTEGRATING ORAL AT BEDTIME
Refills: 0 | Status: DISCONTINUED | OUTPATIENT
Start: 2022-05-15 | End: 2022-05-17

## 2022-05-15 RX ORDER — ONDANSETRON 8 MG/1
4 TABLET, FILM COATED ORAL EVERY 6 HOURS
Refills: 0 | Status: DISCONTINUED | OUTPATIENT
Start: 2022-05-15 | End: 2022-05-15

## 2022-05-15 RX ORDER — HYDRALAZINE HCL 50 MG
50 TABLET ORAL EVERY 8 HOURS
Refills: 0 | Status: DISCONTINUED | OUTPATIENT
Start: 2022-05-15 | End: 2022-05-16

## 2022-05-15 RX ORDER — ONDANSETRON 8 MG/1
4 TABLET, FILM COATED ORAL EVERY 6 HOURS
Refills: 0 | Status: DISCONTINUED | OUTPATIENT
Start: 2022-05-15 | End: 2022-05-17

## 2022-05-15 RX ADMIN — Medication 150 MILLIGRAM(S): at 05:25

## 2022-05-15 RX ADMIN — CLOPIDOGREL BISULFATE 75 MILLIGRAM(S): 75 TABLET, FILM COATED ORAL at 11:34

## 2022-05-15 RX ADMIN — MIRTAZAPINE 7.5 MILLIGRAM(S): 45 TABLET, ORALLY DISINTEGRATING ORAL at 21:41

## 2022-05-15 RX ADMIN — SENNA PLUS 2 TABLET(S): 8.6 TABLET ORAL at 21:42

## 2022-05-15 RX ADMIN — Medication 50 MILLIGRAM(S): at 05:24

## 2022-05-15 RX ADMIN — Medication 50 MILLIGRAM(S): at 17:50

## 2022-05-15 RX ADMIN — Medication 50 MILLIGRAM(S): at 13:55

## 2022-05-15 RX ADMIN — ATORVASTATIN CALCIUM 80 MILLIGRAM(S): 80 TABLET, FILM COATED ORAL at 21:40

## 2022-05-15 RX ADMIN — Medication 81 MILLIGRAM(S): at 11:34

## 2022-05-15 RX ADMIN — ONDANSETRON 4 MILLIGRAM(S): 8 TABLET, FILM COATED ORAL at 08:02

## 2022-05-15 RX ADMIN — ISOSORBIDE DINITRATE 25 MILLIGRAM(S): 5 TABLET ORAL at 23:49

## 2022-05-15 RX ADMIN — TAMSULOSIN HYDROCHLORIDE 0.4 MILLIGRAM(S): 0.4 CAPSULE ORAL at 21:42

## 2022-05-15 RX ADMIN — Medication 0.25 MILLIGRAM(S): at 23:48

## 2022-05-15 RX ADMIN — Medication 0.25 MILLIGRAM(S): at 16:19

## 2022-05-15 RX ADMIN — Medication 100 MILLIGRAM(S): at 05:23

## 2022-05-15 RX ADMIN — ISOSORBIDE DINITRATE 25 MILLIGRAM(S): 5 TABLET ORAL at 05:24

## 2022-05-15 RX ADMIN — ONDANSETRON 4 MILLIGRAM(S): 8 TABLET, FILM COATED ORAL at 22:25

## 2022-05-15 RX ADMIN — ISOSORBIDE DINITRATE 25 MILLIGRAM(S): 5 TABLET ORAL at 14:47

## 2022-05-15 RX ADMIN — ONDANSETRON 4 MILLIGRAM(S): 8 TABLET, FILM COATED ORAL at 16:19

## 2022-05-15 RX ADMIN — Medication 50 MILLIGRAM(S): at 21:41

## 2022-05-15 NOTE — PROGRESS NOTE ADULT - SUBJECTIVE AND OBJECTIVE BOX
Antoni Headley, PGY1    DATE OF SERVICE: 05-15-22 @ 07:12    Patient is a 77y old  Male who presents with a chief complaint of Cardiogenic Shock (14 May 2022 08:22)      SUBJECTIVE / OVERNIGHT EVENTS: Patient downgraded from CCU overnight. Admitted to CCU for cardiogenic shock in setting of inferior wall STEMI.  5/13  LHC: pRCA 80%, mRCA 100% w/ , pLAD 80%, Diag 70%. RA 15, PA 50/14/17, PCWP 31, CI 1.7- IABP placed. LVEDP 38. TTE revealed Severe LV systolic dysfunction w/ EF 35% No intervention as Viability study revealed mid-distal ant, mid-distal anterosept, apical, and inf walls no viability. Septum mild viability    MEDICATIONS  (STANDING):  aspirin enteric coated 81 milliGRAM(s) Oral daily  atorvastatin 80 milliGRAM(s) Oral at bedtime  chlorhexidine 4% Liquid 1 Application(s) Topical <User Schedule>  clopidogrel Tablet 75 milliGRAM(s) Oral daily  hydrALAZINE 100 milliGRAM(s) Oral every 8 hours  isosorbide   dinitrate Tablet (ISORDIL) 25 milliGRAM(s) Oral three times a day  metoprolol tartrate 50 milliGRAM(s) Oral two times a day  polyethylene glycol 3350 17 Gram(s) Oral daily  senna 2 Tablet(s) Oral at bedtime  tamsulosin 0.4 milliGRAM(s) Oral at bedtime  venlafaxine XR. 150 milliGRAM(s) Oral daily    MEDICATIONS  (PRN):  clonazePAM Oral Disintegrating Tablet 0.25 milliGRAM(s) Oral three times a day PRN severe anxiety      Vital Signs Last 24 Hrs  T(C): 36.7 (14 May 2022 20:24), Max: 36.7 (14 May 2022 16:00)  T(F): 98 (14 May 2022 20:24), Max: 98.1 (14 May 2022 16:00)  HR: 94 (14 May 2022 20:24) (63 - 94)  BP: 113/73 (14 May 2022 20:24) (84/52 - 129/76)  BP(mean): 76 (14 May 2022 17:30) (63 - 97)  RR: 19 (14 May 2022 20:24) (12 - 226)  SpO2: 95% (14 May 2022 20:24) (92% - 97%)  CAPILLARY BLOOD GLUCOSE        I&O's Summary    14 May 2022 07:01  -  15 May 2022 07:00  --------------------------------------------------------  IN: 580.6 mL / OUT: 650 mL / NET: -69.4 mL        PHYSICAL EXAM:  Constitutional: NAD, well-groomed, well-developed  HEENT: PERRLA, EOMI, no drainage or redness  Neck: supple,  No JVD  Respiratory: Breath Sounds equal & clear bilaterally to auscultation, no rales/rhonchi/wheezing, no accessory muscle use noted  Cardiovascular: Regular rate, regular rhythm, normal S1, S2; no murmurs or rub  Gastrointestinal: Soft, non-tender, non distended, + bowel sounds  Extremities: GARCIA x 4, no peripheral edema, no cyanosis, no clubbing   Neurological: A+O x 3; speech clear and intact; no sensory, motor  deficits, normal reflexes  Skin: warm, dry, well perfused  Incisions: R Femoral: IABP removed; site is clean/dry/intact w/o surround erythema, non tender to active bleeding    LABS:                        11.7   9.16  )-----------( 246      ( 14 May 2022 03:16 )             35.6     05-14    138  |  108  |  19  ----------------------------<  144<H>  4.1   |  20<L>  |  0.95    Ca    8.4      14 May 2022 03:16  Phos  3.3     05-14  Mg     2.2     05-14    TPro  5.4<L>  /  Alb  2.8<L>  /  TBili  0.7  /  DBili  x   /  AST  26  /  ALT  31  /  AlkPhos  108  05-14    PTT - ( 14 May 2022 03:16 )  PTT:62.6 sec          RADIOLOGY & ADDITIONAL TESTS:    Imaging Personally Reviewed:    Consultant(s) Notes Reviewed:      Care Discussed with Consultants/Other Providers:   Antoni Headley, PGY1    DATE OF SERVICE: 05-15-22 @ 07:12    Patient is a 77y old  Male who presents with a chief complaint of Cardiogenic Shock (14 May 2022 08:22)      SUBJECTIVE / OVERNIGHT EVENTS: Patient downgraded from CCU overnight. Admitted to CCU for cardiogenic shock in setting of inferior wall STEMI.  5/13  LHC: pRCA 80%, mRCA 100% w/ , pLAD 80%, Diag 70%. RA 15, PA 50/14/17, PCWP 31, CI 1.7- IABP placed. LVEDP 38. TTE revealed Severe LV systolic dysfunction w/ EF 35% No intervention as Viability study revealed mid-distal ant, mid-distal anterosept, apical, and inf walls no viability. Septum mild viability    TELE REVIEWED: sinus 70-100s    MEDICATIONS  (STANDING):  aspirin enteric coated 81 milliGRAM(s) Oral daily  atorvastatin 80 milliGRAM(s) Oral at bedtime  chlorhexidine 4% Liquid 1 Application(s) Topical <User Schedule>  clopidogrel Tablet 75 milliGRAM(s) Oral daily  hydrALAZINE 100 milliGRAM(s) Oral every 8 hours  isosorbide   dinitrate Tablet (ISORDIL) 25 milliGRAM(s) Oral three times a day  metoprolol tartrate 50 milliGRAM(s) Oral two times a day  polyethylene glycol 3350 17 Gram(s) Oral daily  senna 2 Tablet(s) Oral at bedtime  tamsulosin 0.4 milliGRAM(s) Oral at bedtime  venlafaxine XR. 150 milliGRAM(s) Oral daily    MEDICATIONS  (PRN):  clonazePAM Oral Disintegrating Tablet 0.25 milliGRAM(s) Oral three times a day PRN severe anxiety      Vital Signs Last 24 Hrs  T(C): 36.7 (14 May 2022 20:24), Max: 36.7 (14 May 2022 16:00)  T(F): 98 (14 May 2022 20:24), Max: 98.1 (14 May 2022 16:00)  HR: 94 (14 May 2022 20:24) (63 - 94)  BP: 113/73 (14 May 2022 20:24) (84/52 - 129/76)  BP(mean): 76 (14 May 2022 17:30) (63 - 97)  RR: 19 (14 May 2022 20:24) (12 - 226)  SpO2: 95% (14 May 2022 20:24) (92% - 97%)  CAPILLARY BLOOD GLUCOSE        I&O's Summary    14 May 2022 07:01  -  15 May 2022 07:00  --------------------------------------------------------  IN: 580.6 mL / OUT: 650 mL / NET: -69.4 mL        PHYSICAL EXAM:  Constitutional: NAD, well-groomed, well-developed  HEENT: PERRLA, EOMI, no drainage or redness  Neck: supple,  No JVD  Respiratory: Breath Sounds equal & clear bilaterally to auscultation, no rales/rhonchi/wheezing, no accessory muscle use noted  Cardiovascular: Regular rate, regular rhythm, normal S1, S2; no murmurs or rub  Gastrointestinal: Soft, non-tender, non distended, + bowel sounds  Extremities: GARCIA x 4, no peripheral edema, no cyanosis, no clubbing   Neurological: A+O x 3; speech clear and intact; no sensory, motor  deficits, normal reflexes  Skin: warm, dry, well perfused  Incisions: R Femoral: IABP removed; site is clean/dry/intact w/o surround erythema, non tender to active bleeding    LABS:                        11.7   9.16  )-----------( 246      ( 14 May 2022 03:16 )             35.6     05-14    138  |  108  |  19  ----------------------------<  144<H>  4.1   |  20<L>  |  0.95    Ca    8.4      14 May 2022 03:16  Phos  3.3     05-14  Mg     2.2     05-14    TPro  5.4<L>  /  Alb  2.8<L>  /  TBili  0.7  /  DBili  x   /  AST  26  /  ALT  31  /  AlkPhos  108  05-14    PTT - ( 14 May 2022 03:16 )  PTT:62.6 sec          RADIOLOGY & ADDITIONAL TESTS:    Imaging Personally Reviewed:    Consultant(s) Notes Reviewed:      Care Discussed with Consultants/Other Providers:   Antoni Headley, PGY1    DATE OF SERVICE: 05-15-22 @ 07:12    Patient is a 77y old  Male who presents with a chief complaint of Cardiogenic Shock (14 May 2022 08:22)      SUBJECTIVE / OVERNIGHT EVENTS: Patient downgraded from CCU overnight. Admitted to CCU for cardiogenic shock in setting of inferior wall STEMI.  5/13  LHC: pRCA 80%, mRCA 100% w/ , pLAD 80%, Diag 70%. RA 15, PA 50/14/17, PCWP 31, CI 1.7- IABP placed. LVEDP 38. TTE revealed Severe LV systolic dysfunction w/ EF 35% No intervention as Viability study revealed mid-distal ant, mid-distal anterosept, apical, and inf walls no viability. Septum mild viability.    Patient notes some nausea, but otherwise feels well this AM. No chest pain, dyspnea, or abdominal pain.     TELE REVIEWED: sinus 70-100s    MEDICATIONS  (STANDING):  aspirin enteric coated 81 milliGRAM(s) Oral daily  atorvastatin 80 milliGRAM(s) Oral at bedtime  chlorhexidine 4% Liquid 1 Application(s) Topical <User Schedule>  clopidogrel Tablet 75 milliGRAM(s) Oral daily  hydrALAZINE 100 milliGRAM(s) Oral every 8 hours  isosorbide   dinitrate Tablet (ISORDIL) 25 milliGRAM(s) Oral three times a day  metoprolol tartrate 50 milliGRAM(s) Oral two times a day  polyethylene glycol 3350 17 Gram(s) Oral daily  senna 2 Tablet(s) Oral at bedtime  tamsulosin 0.4 milliGRAM(s) Oral at bedtime  venlafaxine XR. 150 milliGRAM(s) Oral daily    MEDICATIONS  (PRN):  clonazePAM Oral Disintegrating Tablet 0.25 milliGRAM(s) Oral three times a day PRN severe anxiety      Vital Signs Last 24 Hrs  T(C): 36.7 (14 May 2022 20:24), Max: 36.7 (14 May 2022 16:00)  T(F): 98 (14 May 2022 20:24), Max: 98.1 (14 May 2022 16:00)  HR: 94 (14 May 2022 20:24) (63 - 94)  BP: 113/73 (14 May 2022 20:24) (84/52 - 129/76)  BP(mean): 76 (14 May 2022 17:30) (63 - 97)  RR: 19 (14 May 2022 20:24) (12 - 226)  SpO2: 95% (14 May 2022 20:24) (92% - 97%)  CAPILLARY BLOOD GLUCOSE        I&O's Summary    14 May 2022 07:01  -  15 May 2022 07:00  --------------------------------------------------------  IN: 580.6 mL / OUT: 650 mL / NET: -69.4 mL        PHYSICAL EXAM:  Constitutional: NAD, well-groomed, well-developed  HEENT: PERRLA, EOMI, no drainage or redness  Neck: supple,  No JVD  Respiratory: Breath Sounds equal & clear bilaterally to auscultation, no rales/rhonchi/wheezing, no accessory muscle use noted  Cardiovascular: Regular rate, regular rhythm, normal S1, S2; no murmurs or rub  Gastrointestinal: Soft, non-tender, non distended, + bowel sounds  Extremities: GARCIA x 4, no peripheral edema, no cyanosis, no clubbing   Neurological: A+O x 3; speech clear and intact; no sensory, motor  deficits, normal reflexes  Skin: warm, dry, well perfused  Incisions: R Femoral: IABP removed; site is clean/dry/intact w/o surround erythema, non tender to active bleeding    LABS:                        11.7   9.16  )-----------( 246      ( 14 May 2022 03:16 )             35.6     05-14    138  |  108  |  19  ----------------------------<  144<H>  4.1   |  20<L>  |  0.95    Ca    8.4      14 May 2022 03:16  Phos  3.3     05-14  Mg     2.2     05-14    TPro  5.4<L>  /  Alb  2.8<L>  /  TBili  0.7  /  DBili  x   /  AST  26  /  ALT  31  /  AlkPhos  108  05-14    PTT - ( 14 May 2022 03:16 )  PTT:62.6 sec          RADIOLOGY & ADDITIONAL TESTS:    Imaging Personally Reviewed:    Consultant(s) Notes Reviewed:      Care Discussed with Consultants/Other Providers:

## 2022-05-15 NOTE — PROGRESS NOTE ADULT - PROBLEM SELECTOR PLAN 1
- complicated by cardiogenic shock requiring CCU admission and IABP placement (now removed)  - LHC with significant occlusion pRCA 80%, mRCA 100%, pLAD 80%, diag 70%  - TTE with LV systolic dysfunction w/ EF 35%  - Viability: mid to distal anterior, mid to distal anteroseptal, apical, and inferior walls do not demonstrate significant viability.  The septum demonstrates mild viability. The remaining segments are viable (predominantly the lateral wall only)  - on ASA and clopidogrel  - high dose statin  - lopressor 50mg BID-> transition to metoprolol succinate 100mg  - isordil 25 mg TID  - hydralazine 100mg q8H - complicated by cardiogenic shock requiring CCU admission and IABP placement (now removed)  - LHC with significant occlusion pRCA 80%, mRCA 100%, pLAD 80%, diag 70%  - TTE with LV systolic dysfunction w/ EF 35%  - Viability: mid to distal anterior, mid to distal anteroseptal, apical, and inferior walls do not demonstrate significant viability.  The septum demonstrates mild viability. The remaining segments are viable (predominantly the lateral wall only)  - on ASA and clopidogrel  - high dose statin  - lopressor 50mg BID-> transition to metoprolol succinate 100mg when rec by cardiology  - isordil 25 mg TID  - hydralazine 50mg q8H  - will likely eventually transition to ace/arb or arni

## 2022-05-15 NOTE — PHYSICAL THERAPY INITIAL EVALUATION ADULT - PERTINENT HX OF CURRENT PROBLEM, REHAB EVAL
77M a/w cardiogenic shock presented to the hospital for intermittent SOB on exertion for two weeks associated with nausea, palpitations and chest pressure that self resolved. Found to have abnormal EKG concerning for impending occlusion and was transferred  for catheterization.

## 2022-05-15 NOTE — PROGRESS NOTE ADULT - PROBLEM SELECTOR PLAN 2
- home effexor  - holding remeron qith elevated qtc - home effexor  - restart remeron given QTc is now under 500

## 2022-05-15 NOTE — PROGRESS NOTE ADULT - ASSESSMENT
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 Admitted to medicine for further medical management.

## 2022-05-15 NOTE — PHYSICAL THERAPY INITIAL EVALUATION ADULT - PRECAUTIONS/LIMITATIONS, REHAB EVAL
Catheterization showed 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 Pt with STEMI of inferior wall. IABP placed on 5/13. Pt now s/p IABP right femoral and right swanz d/c on 5/14/22./fall precautions

## 2022-05-15 NOTE — PHYSICAL THERAPY INITIAL EVALUATION ADULT - ADDITIONAL COMMENTS
Pt lives with wife in a 2 story private home, with 3 steps to enter, b/l handrails available. Pt reports there is a first floor set up available. PTA pt was independent with ambulation no AD, and all ADLs including showering and dressing.

## 2022-05-15 NOTE — PROGRESS NOTE ADULT - PROBLEM SELECTOR PLAN 5
DVT ppx: subq heparin given recent procedures  Diet: DASH  [facility] per V[facility] assessment, patient is low risk  Diet: DASH  [facility] per [facility] assessment, patient is low risk  Diet: DASH  [facility]  communication: updated wife at bedside 5/15

## 2022-05-16 LAB
ALBUMIN SERPL ELPH-MCNC: 3.2 G/DL — LOW (ref 3.3–5)
ALP SERPL-CCNC: 254 U/L — HIGH (ref 40–120)
ALT FLD-CCNC: 91 U/L — HIGH (ref 10–45)
ANION GAP SERPL CALC-SCNC: 13 MMOL/L — SIGNIFICANT CHANGE UP (ref 5–17)
AST SERPL-CCNC: 88 U/L — HIGH (ref 10–40)
BASOPHILS # BLD AUTO: 0.05 K/UL — SIGNIFICANT CHANGE UP (ref 0–0.2)
BASOPHILS NFR BLD AUTO: 0.6 % — SIGNIFICANT CHANGE UP (ref 0–2)
BILIRUB SERPL-MCNC: 1 MG/DL — SIGNIFICANT CHANGE UP (ref 0.2–1.2)
BUN SERPL-MCNC: 30 MG/DL — HIGH (ref 7–23)
CALCIUM SERPL-MCNC: 9.1 MG/DL — SIGNIFICANT CHANGE UP (ref 8.4–10.5)
CHLORIDE SERPL-SCNC: 104 MMOL/L — SIGNIFICANT CHANGE UP (ref 96–108)
CO2 SERPL-SCNC: 18 MMOL/L — LOW (ref 22–31)
CREAT SERPL-MCNC: 1.04 MG/DL — SIGNIFICANT CHANGE UP (ref 0.5–1.3)
EGFR: 74 ML/MIN/1.73M2 — SIGNIFICANT CHANGE UP
EOSINOPHIL # BLD AUTO: 0.32 K/UL — SIGNIFICANT CHANGE UP (ref 0–0.5)
EOSINOPHIL NFR BLD AUTO: 3.8 % — SIGNIFICANT CHANGE UP (ref 0–6)
GLUCOSE SERPL-MCNC: 90 MG/DL — SIGNIFICANT CHANGE UP (ref 70–99)
HCT VFR BLD CALC: 37.5 % — LOW (ref 39–50)
HGB BLD-MCNC: 12.2 G/DL — LOW (ref 13–17)
IMM GRANULOCYTES NFR BLD AUTO: 0.7 % — SIGNIFICANT CHANGE UP (ref 0–1.5)
LYMPHOCYTES # BLD AUTO: 2.52 K/UL — SIGNIFICANT CHANGE UP (ref 1–3.3)
LYMPHOCYTES # BLD AUTO: 30.1 % — SIGNIFICANT CHANGE UP (ref 13–44)
MAGNESIUM SERPL-MCNC: 2.1 MG/DL — SIGNIFICANT CHANGE UP (ref 1.6–2.6)
MCHC RBC-ENTMCNC: 30.9 PG — SIGNIFICANT CHANGE UP (ref 27–34)
MCHC RBC-ENTMCNC: 32.5 GM/DL — SIGNIFICANT CHANGE UP (ref 32–36)
MCV RBC AUTO: 94.9 FL — SIGNIFICANT CHANGE UP (ref 80–100)
MONOCYTES # BLD AUTO: 1.01 K/UL — HIGH (ref 0–0.9)
MONOCYTES NFR BLD AUTO: 12.1 % — SIGNIFICANT CHANGE UP (ref 2–14)
NEUTROPHILS # BLD AUTO: 4.4 K/UL — SIGNIFICANT CHANGE UP (ref 1.8–7.4)
NEUTROPHILS NFR BLD AUTO: 52.7 % — SIGNIFICANT CHANGE UP (ref 43–77)
NRBC # BLD: 0 /100 WBCS — SIGNIFICANT CHANGE UP (ref 0–0)
PHOSPHATE SERPL-MCNC: 3.9 MG/DL — SIGNIFICANT CHANGE UP (ref 2.5–4.5)
PLATELET # BLD AUTO: 264 K/UL — SIGNIFICANT CHANGE UP (ref 150–400)
POTASSIUM SERPL-MCNC: 4.4 MMOL/L — SIGNIFICANT CHANGE UP (ref 3.5–5.3)
POTASSIUM SERPL-SCNC: 4.4 MMOL/L — SIGNIFICANT CHANGE UP (ref 3.5–5.3)
PROT SERPL-MCNC: 6.5 G/DL — SIGNIFICANT CHANGE UP (ref 6–8.3)
RBC # BLD: 3.95 M/UL — LOW (ref 4.2–5.8)
RBC # FLD: 14.9 % — HIGH (ref 10.3–14.5)
SODIUM SERPL-SCNC: 135 MMOL/L — SIGNIFICANT CHANGE UP (ref 135–145)
WBC # BLD: 8.36 K/UL — SIGNIFICANT CHANGE UP (ref 3.8–10.5)
WBC # FLD AUTO: 8.36 K/UL — SIGNIFICANT CHANGE UP (ref 3.8–10.5)

## 2022-05-16 PROCEDURE — 99232 SBSQ HOSP IP/OBS MODERATE 35: CPT

## 2022-05-16 PROCEDURE — 99232 SBSQ HOSP IP/OBS MODERATE 35: CPT | Mod: GC

## 2022-05-16 RX ORDER — LOSARTAN POTASSIUM 100 MG/1
25 TABLET, FILM COATED ORAL DAILY
Refills: 0 | Status: DISCONTINUED | OUTPATIENT
Start: 2022-05-16 | End: 2022-05-17

## 2022-05-16 RX ORDER — METOPROLOL TARTRATE 50 MG
100 TABLET ORAL ONCE
Refills: 0 | Status: COMPLETED | OUTPATIENT
Start: 2022-05-16 | End: 2022-05-16

## 2022-05-16 RX ORDER — CLONAZEPAM 1 MG
0.25 TABLET ORAL EVERY 8 HOURS
Refills: 0 | Status: DISCONTINUED | OUTPATIENT
Start: 2022-05-16 | End: 2022-05-17

## 2022-05-16 RX ORDER — ISOSORBIDE DINITRATE 5 MG/1
25 TABLET ORAL THREE TIMES A DAY
Refills: 0 | Status: DISCONTINUED | OUTPATIENT
Start: 2022-05-16 | End: 2022-05-17

## 2022-05-16 RX ORDER — CLONAZEPAM 1 MG
0.5 TABLET ORAL AT BEDTIME
Refills: 0 | Status: DISCONTINUED | OUTPATIENT
Start: 2022-05-16 | End: 2022-05-17

## 2022-05-16 RX ORDER — METOPROLOL TARTRATE 50 MG
100 TABLET ORAL DAILY
Refills: 0 | Status: DISCONTINUED | OUTPATIENT
Start: 2022-05-17 | End: 2022-05-17

## 2022-05-16 RX ADMIN — SENNA PLUS 2 TABLET(S): 8.6 TABLET ORAL at 21:23

## 2022-05-16 RX ADMIN — Medication 50 MILLIGRAM(S): at 13:46

## 2022-05-16 RX ADMIN — ISOSORBIDE DINITRATE 25 MILLIGRAM(S): 5 TABLET ORAL at 21:25

## 2022-05-16 RX ADMIN — Medication 81 MILLIGRAM(S): at 12:03

## 2022-05-16 RX ADMIN — MIRTAZAPINE 7.5 MILLIGRAM(S): 45 TABLET, ORALLY DISINTEGRATING ORAL at 21:22

## 2022-05-16 RX ADMIN — Medication 50 MILLIGRAM(S): at 06:22

## 2022-05-16 RX ADMIN — Medication 0.5 MILLIGRAM(S): at 21:22

## 2022-05-16 RX ADMIN — ISOSORBIDE DINITRATE 25 MILLIGRAM(S): 5 TABLET ORAL at 13:46

## 2022-05-16 RX ADMIN — Medication 100 MILLIGRAM(S): at 17:39

## 2022-05-16 RX ADMIN — CLOPIDOGREL BISULFATE 75 MILLIGRAM(S): 75 TABLET, FILM COATED ORAL at 12:03

## 2022-05-16 RX ADMIN — CHLORHEXIDINE GLUCONATE 1 APPLICATION(S): 213 SOLUTION TOPICAL at 06:20

## 2022-05-16 RX ADMIN — Medication 150 MILLIGRAM(S): at 06:22

## 2022-05-16 RX ADMIN — ISOSORBIDE DINITRATE 25 MILLIGRAM(S): 5 TABLET ORAL at 06:23

## 2022-05-16 RX ADMIN — TAMSULOSIN HYDROCHLORIDE 0.4 MILLIGRAM(S): 0.4 CAPSULE ORAL at 21:22

## 2022-05-16 RX ADMIN — ONDANSETRON 4 MILLIGRAM(S): 8 TABLET, FILM COATED ORAL at 20:12

## 2022-05-16 NOTE — BH CONSULTATION LIAISON PROGRESS NOTE - NSBHASSESSMENTFT_PSY_ALL_CORE
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  Psychiatry consulted for anxiety. On initial exam, patient alert/calm/cooperative/oriented and anxious-appearing. Reported refractory depression and anxiety at home and here in the hospital. Reported some passive suicidal ideation in the past month but firmly denies active suicidal ideation/intent/plan. Denied confusion/hallucinations. Stated he wishes to resume his home medications in the hospital.    5/16: Patient reassessed. Sitting up and appearing brighter with more reactive less constricted affect. Reports improvement in anxiety with resumption of mirtazapine. Denies suicidality/homicidality/confusion/psychosis. Discussed short-term role of small dose of clonazepam PRN for panic/severe anxiety. Patient expresses understanding. Considering transferring care to [facility].    RECOMMENDATIONS:  1) C/w venlafaxine XR 150mg PO qD and mirtazapine 7.5mg PO qHS.   2) May use clonazepam 0.25mg at qhs standing and up to TID PRN for breakthrough anxiety.  3)  Psych to follow. No psychiatric contraindication for discharge to outpatient level of care. Patient and wife provided with [facility] information; please provide clinic number as below for discharge papers. Patient should otherwise follow up with his outpatient psychiatrist. 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  Psychiatry consulted for anxiety. On initial exam, patient alert/calm/cooperative/oriented and anxious-appearing. Reported refractory depression and anxiety at home and here in the hospital. Reported some passive suicidal ideation in the past month but firmly denies active suicidal ideation/intent/plan. Denied confusion/hallucinations. Stated he wishes to resume his home medications in the hospital.    5/16: Patient reassessed. Sitting up and appearing brighter with more reactive less constricted affect. Reports improvement in anxiety with resumption of mirtazapine. Denies suicidality/homicidality/confusion/psychosis. Discussed short-term role of small dose of clonazepam PRN for panic/severe anxiety. Patient expresses understanding. Considering transferring care to [facility].    RECOMMENDATIONS:  1) C/w venlafaxine XR 150mg PO qD and mirtazapine 7.5mg PO qHS.   2) Changed to clonazepam 0.5mg at qhs standing and up to TID PRN for breakthrough anxiety.  3)  Psych to follow. No psychiatric contraindication for discharge to outpatient level of care. Patient and wife provided with [facility] information; please provide clinic number as below for discharge papers. Patient should otherwise follow up with his outpatient psychiatrist.

## 2022-05-16 NOTE — BH CONSULTATION LIAISON PROGRESS NOTE - NSBHFUPINTERVALHXFT_PSY_A_CORE
Chart reviewed. Patient received clonazepam 0.25mg PO twice yesterday PRN anxiety.    Patient seen and examined. Reports overall improvement in anxiety since restarting mirtazapine. Reports improved sleep overnight last night. States he is eager to return home. Denies suicidal/aggressive ideation/intent/plan, confusion, hallucinations, and paranoia. Reviewed use of clonazepam as a sparing PRN for the purposes of bridging to improved control of anxiety with titration of standing medications. Patient states he is considering transferring his outpatient care to the [facility]. Chart reviewed. Patient received clonazepam 0.25mg PO twice yesterday PRN anxiety. Mirtazapine held on 5/14 for EKG with QTc 508ms, repeat EKGs < 500ms as below and mirtazapine resumed.    Patient seen and examined. Reports overall improvement in anxiety since restarting mirtazapine. Reports improved sleep overnight last night. States he is eager to return home. Denies suicidal/aggressive ideation/intent/plan, confusion, hallucinations, and paranoia. Reviewed use of clonazepam as a sparing PRN for the purposes of bridging to improved control of anxiety with titration of standing medications. Patient states he is considering transferring his outpatient care to the [facility].

## 2022-05-16 NOTE — CONSULT NOTE ADULT - ASSESSMENT
78 y/o M with PMH of HLD, bipolar disorder, anxiety, and BPH presenting with OGLESBY for 2 weeks, palpitations, and chest pressure resolving with rest, found to have abnormal EKG, transferred from 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 Cardiology consulted for continued management of HFrEF with GDMT titration.      #HFrEF s/p STEMI  -Transferred from [facility] on 5/11 after presenting to ED with OGLESBY for past 2 weeks, palpitations, chest pressure. Symptoms seem to alleviate with rest, pt very physically active, goes swimming 3x a week. Transferred to [facility] for cardiac catheterization given EKGs showed EILEEN in V1-V3, TWI in V2-V5 at [facility], trop 99 -> 94, aspirin/plavix loaded, started on heparin gtt.  -LHC/RHC 5/11: pRCA 80%, mRCA 100% w/ , pLAD 80%, Diag 70%. RA 15, PA 50/14/17, PCWP 31, CI 1.7, LVEDP 38. IABP placed via right femoral access for support.  -Cardiac Viability study 5/12: LVEF 29%. No significant viability in distal anterior, mid to distal anteroseptal, apical, and inferior walls. The septum demonstrated mild viability. The remaining segments are viable (predominantly lateral wall only).   -TTE 5/12: LVEF 35% ([facility]), severe segmental LV systolic dysfunction, mid anterior septum, apical inferior wall, mid inferolateral wall, apical anterior wall, apical septum apical lateral wall, basal inferior wall, mid inferior wall, mid inferoseptum, and basal inferoseptum are akinetic. Stage I mild diastolic dysfunction. b/l pleural effusions.  -In CCU, pt was on Nipride gtt for afterload reduction, later weaned, started on lopressor later uptitrated to 50 mg BID and isordil uptitrated to 25 mg TID with hydralazine 50 mg TID.  -5/14: IABP and R [place] d/erika, remeron held due to QTc 508, transferred to medicine floors for further management.    -c/w DAPT, high intensity statin  -    Recommendations not final until note cosigned by attending.  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 Cardiology consulted for continued management of HFrEF with GDMT titration.      #HFrEF s/p STEMI  -Transferred from [facility] on 5/11 after presenting to ED with OGLESBY for past 2 weeks, palpitations, chest pressure. Symptoms seem to alleviate with rest, pt very physically active, goes swimming 3x a week. Transferred to [facility] for cardiac catheterization given EKGs showed EILEEN in V1-V3, TWI in V2-V5 at [facility], trop 99 -> 94, aspirin/plavix loaded, started on heparin gtt.  -LHC/RHC 5/11: pRCA 80%, mRCA 100% w/ , pLAD 80%, Diag 70%. RA 15, PA 50/14/17, PCWP 31, CI 1.7, LVEDP 38. IABP placed via right femoral access for support.  -Cardiac Viability study 5/12: LVEF 29%. No significant viability in distal anterior, mid to distal anteroseptal, apical, and inferior walls. The septum demonstrated mild viability. The remaining segments are viable (predominantly lateral wall only).   -TTE 5/12: LVEF 35% ([facility]), severe segmental LV systolic dysfunction, mid anterior septum, apical inferior wall, mid inferolateral wall, apical anterior wall, apical septum apical lateral wall, basal inferior wall, mid inferior wall, mid inferoseptum, and basal inferoseptum are akinetic. Stage I mild diastolic dysfunction. b/l pleural effusions.  -In CCU, pt was on Nipride gtt for afterload reduction, later weaned, started on lopressor later uptitrated to 50 mg BID and isordil uptitrated to 25 mg TID with hydralazine 50 mg TID.  -5/14: IABP and R [place] d/erika, remeron held due to QTc 508, transferred to medicine floors for further management.    -c/w DAPT  -would d/c atorvastatin in setting of newly elevated LFTs  -would d/c hydralazine and start losartan with eventual bridge to entresto  -give lopressor 100 mg tonight at 6 PM then start toprol xl 100 mg daily tomorrow AM    Recommendations not final until note cosigned by attending.  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 Cardiology consulted for continued management of HFrEF with GDMT titration.      #HFrEF s/p STEMI  -Transferred from [facility] on 5/11 after presenting to ED with OGLESBY for past 2 weeks, palpitations, chest pressure. Symptoms seem to alleviate with rest, pt very physically active, goes swimming 3x a week. Transferred to [facility] for cardiac catheterization given EKGs showed EILEEN in V1-V3, TWI in V2-V5 at [facility], trop 99 -> 94, aspirin/plavix loaded, started on heparin gtt.  -LHC/RHC 5/11: pRCA 80%, mRCA 100% w/ , pLAD 80%, Diag 70%. RA 15, PA 50/14/17, PCWP 31, CI 1.7, LVEDP 38. IABP placed via right femoral access for support.  -Cardiac Viability study 5/12: LVEF 29%. No significant viability in distal anterior, mid to distal anteroseptal, apical, and inferior walls. The septum demonstrated mild viability. The remaining segments are viable (predominantly lateral wall only).   -TTE 5/12: LVEF 35% ([facility]), severe segmental LV systolic dysfunction, mid anterior septum, apical inferior wall, mid inferolateral wall, apical anterior wall, apical septum apical lateral wall, basal inferior wall, mid inferior wall, mid inferoseptum, and basal inferoseptum are akinetic. Stage I mild diastolic dysfunction. b/l pleural effusions.  -In CCU, pt was on Nipride gtt for afterload reduction, later weaned, started on lopressor later uptitrated to 50 mg BID and isordil uptitrated to 25 mg TID with hydralazine 50 mg TID.  -5/14: IABP and R [place] d/erika, remeron held due to QTc 508, transferred to medicine floors for further management.    -c/w DAPT  -would d/c atorvastatin in setting of newly elevated LFTs  -would d/c hydralazine and start losartan with eventual bridge to entresto  -give lopressor 100 mg tonight at 6 PM then start toprol xl 100 mg daily tomorrow AM with hold parameters  -euvolemic on exam, currently no need for diuresis    Recommendations not final until note cosigned by attending.  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 Cardiology consulted for continued management of HFrEF with GDMT titration.      #HFrEF s/p STEMI  -Transferred from [facility] on 5/11 after presenting to ED with OGLESBY for past 2 weeks, palpitations, chest pressure. Symptoms seem to alleviate with rest, pt very physically active, goes swimming 3x a week. Transferred to [facility] for cardiac catheterization given EKGs showed EILEEN in V1-V3, TWI in V2-V5 at [facility], trop 99 -> 94, aspirin/plavix loaded, started on heparin gtt.  -LHC/RHC 5/11: pRCA 80%, mRCA 100% w/ , pLAD 80%, Diag 70%. RA 15, PA 50/14/17, PCWP 31, CI 1.7, LVEDP 38. IABP placed via right femoral access for support.  -Cardiac Viability study 5/12: LVEF 29%. No significant viability in distal anterior, mid to distal anteroseptal, apical, and inferior walls. The septum demonstrated mild viability. The remaining segments are viable (predominantly lateral wall only).   -TTE 5/12: LVEF 35% ([facility]), severe segmental LV systolic dysfunction, mid anterior septum, apical inferior wall, mid inferolateral wall, apical anterior wall, apical septum apical lateral wall, basal inferior wall, mid inferior wall, mid inferoseptum, and basal inferoseptum are akinetic. Stage I mild diastolic dysfunction. b/l pleural effusions.  -In CCU, pt was on Nipride gtt for afterload reduction, later weaned, started on lopressor later uptitrated to 50 mg BID and isordil uptitrated to 25 mg TID with hydralazine 50 mg TID.  -5/14: IABP and R [place] d/erika, remeron held due to QTc 508, transferred to medicine floors for further management.    -c/w DAPT  -would d/c atorvastatin in setting of newly elevated LFTs  -would d/c hydralazine and start losartan with eventual bridge to entresto  -goal HR 50-60 post-STEMI  -give lopressor 100 mg tonight at 6 PM then start toprol xl 100 mg daily tomorrow AM with hold parameters  -euvolemic on exam, currently no need for diuresis    Recommendations not final until note cosigned by attending.  78 y/o M with PMH of HLD, bipolar disorder, anxiety, and BPH presenting with OGLESBY for 2 weeks, palpitations, and chest pressure resolving with rest, found to have abnormal EKG, transferred from [facility] on 5/11 for cardiac catheterization with [facility] remarkable for complete occlusion of RCA, 70% occlusion of LAD, CI 1.7, s/p IABP, admitted to CCU for cardiogenic shock, downgraded to medicine floors on 5/14. Cardiology consulted for continued management of HFrEF with GDMT titration.    #HFrEF s/p STEMI  -Transferred from [facility] on 5/11 after presenting to ED with OGLESBY for past 2 weeks, palpitations, chest pressure. Symptoms seem to alleviate with rest, pt very physically active, goes swimming 3x a week. Transferred to [facility] for cardiac catheterization given EKGs showed EILEEN in V1-V3, TWI in V2-V5 at [facility], trop 99 -> 94, aspirin/plavix loaded, started on heparin gtt.  -LHC/RHC 5/11: pRCA 80%, mRCA 100% w/ , pLAD 80%, Diag 70%. RA 15, PA 50/14/17, PCWP 31, CI 1.7, LVEDP 38. IABP placed via right femoral access for support.  -Cardiac Viability study 5/12: LVEF 29%. No significant viability in distal anterior, mid to distal anteroseptal, apical, and inferior walls. The septum demonstrated mild viability. The remaining segments are viable (predominantly lateral wall only).   -TTE 5/12: LVEF 35% ([facility]), severe segmental LV systolic dysfunction, mid anterior septum, apical inferior wall, mid inferolateral wall, apical anterior wall, apical septum apical lateral wall, basal inferior wall, mid inferior wall, mid inferoseptum, and basal inferoseptum are akinetic. Stage I mild diastolic dysfunction. b/l pleural effusions.  -In CCU, pt was on Nipride gtt for afterload reduction, later weaned, started on lopressor later uptitrated to 50 mg BID and isordil uptitrated to 25 mg TID with hydralazine 50 mg TID.  -5/14: IABP and R [place] d/erika, remeron held due to QTc 508, transferred to medicine floors for further management.    -c/w DAPT  -would d/c atorvastatin in setting of newly elevated LFTs; resume once improved.   -would d/c hydralazine and isosorbide dinitrate and start losartan with eventual bridge to Entresto outpatient.   -goal HR <70 post-STEMI  -give lopressor 100 mg tonight at 6 PM then start toprol xl 150 mg daily tomorrow AM with hold parameters; uptitrtate as needed to meet goal.   -euvolemic on exam, currently no need for IV diuresis however may likely need some diuretic at home either low dose maintenance or prn. Consider doing patient directed weight based diuresis. Daily weights at home with instructions to take PO furosemide 40 mg if >3lb/day or >5 lb/week from dry weight.  -ordered pBNP for reference if patient admitted with decompensated heart failure,.

## 2022-05-16 NOTE — PROGRESS NOTE ADULT - PROBLEM SELECTOR PLAN 2
- home effexor  - restart remeron given QTc is now under 500 - home effexor  - restart remeron given QTc is now under 500  - clonazepam PRN  - appreciate psych recs

## 2022-05-16 NOTE — BH CONSULTATION LIAISON PROGRESS NOTE - CASE SUMMARY
Pt is a 77yr old man with long PPHx depression and anxiety, admitted for chest pain/dyspnea found to have complete RCA occlusion and partial LAD occlusion. [facility] consulted for anxiety.  Patient alert/calm/cooperative/oriented and anxious-appearing. Reporting refractory depression and anxiety at home and here in the hospital. Denies having any thoughts of harming himself or others at this time and has no past suicidal behaviors.  Denies confusion/hallucinations. States he wishes to resume his home medications in the hospital. Agree with restarting Clonazepam 0.25mg at bed time and as a prn and continuing Effexor and Remeron 7.5mg at bedtime.  Pt is a 77yr old man with long PPHx depression and anxiety, admitted for chest pain/dyspnea found to have complete RCA occlusion and partial LAD occlusion. [facility] consulted for anxiety.  Patient alert/calm/cooperative/oriented and anxious-appearing. Reporting refractory depression and anxiety at home and here in the hospital. Denies having any thoughts of harming himself or others at this time and has no past suicidal behaviors.  Denies confusion/hallucinations. States he wishes to resume his home medications in the hospital. Agree with changing to Clonazepam 0.5mg at bed time and as a prn and continuing Effexor and Remeron 7.5mg at bedtime.

## 2022-05-16 NOTE — CONSULT NOTE ADULT - SUBJECTIVE AND OBJECTIVE BOX
Cardiology Consult Note   Coty Fernando, PGY3  Internal Medicine Resident on Cardiology Consult Service   [phone] - [facility] [id number]    Patient is a 77y old  Male who presents with a chief complaint of Cardiogenic Shock (16 May 2022 07:46)      HPI:  78 yo M w/ history of bipolar disorder, anxiety and BPH coming in as a transfer from [facility] for further cardiac intervention. Patient presented to the hospital for intermittent SOB on exertion for two weeks associated with nausea, palpitations and chest pressure that self resolved. Patient typically active; swimming 3x a week at baseline but notes worsened fatigue while swimming. Found to have abnormal EKG concerning for impending occlusion and was transferred for catheterization.   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  (11 May 2022 17:36)      PAST MEDICAL & SURGICAL HISTORY:  Depression      Constipation      Urinary retention      History of hernia repair             MEDICATIONS  (STANDING):  aspirin enteric coated 81 milliGRAM(s) Oral daily  atorvastatin 80 milliGRAM(s) Oral at bedtime  chlorhexidine 4% Liquid 1 Application(s) Topical <User Schedule>  clopidogrel Tablet 75 milliGRAM(s) Oral daily  hydrALAZINE 50 milliGRAM(s) Oral every 8 hours  isosorbide   dinitrate Tablet (ISORDIL) 25 milliGRAM(s) Oral three times a day  metoprolol tartrate 50 milliGRAM(s) Oral two times a day  mirtazapine 7.5 milliGRAM(s) Oral at bedtime  polyethylene glycol 3350 17 Gram(s) Oral daily  senna 2 Tablet(s) Oral at bedtime  tamsulosin 0.4 milliGRAM(s) Oral at bedtime  venlafaxine XR. 150 milliGRAM(s) Oral daily    MEDICATIONS  (PRN):  clonazePAM Oral Disintegrating Tablet 0.25 milliGRAM(s) Oral three times a day PRN severe anxiety  ondansetron Injectable 4 milliGRAM(s) IV Push every 6 hours PRN Nausea and/or Vomiting      FAMILY HISTORY:      SOCIAL HISTORY:    Vital Signs Last 24 Hrs  T(C): 36.7 (16 May 2022 11:00), Max: 36.7 (16 May 2022 11:00)  T(F): 98.1 (16 May 2022 11:00), Max: 98.1 (16 May 2022 11:00)  HR: 89 (16 May 2022 11:00) (75 - 96)  BP: 107/69 (16 May 2022 11:00) (107/68 - 124/72)  BP(mean): --  RR: 18 (16 May 2022 11:00) (18 - 18)  SpO2: 95% (16 May 2022 11:00) (93% - 95%)    General: No acute distress.  HEENT: NCAT.  PERRL.  EOMI.  No scleral icterus or injection.  Moist MM.  No oropharyngeal exudates.    Neck: Supple.  Full ROM.  No JVD.  No thyromegaly. No lymphadenopathy.   Heart: RRR.  Normal S1 and S2.  No murmurs, rubs, or gallops.   Lungs: CTAB. No wheezes, crackles, or rhonchi.    Abdomen: BS+, soft, NT/ND.  No organomegaly.  Skin: Warm and dry.  No rashes.  Extremities: No edema, clubbing, or cyanosis.  2+ peripheral pulses b/l.  Musculoskeletal: No deformities.  No spinal or paraspinal tenderness.  Neuro: A&Ox3.  CN II-XII intact.  5/5 strength in UE and LE b/l.  Tactile sensation intact in UE and LE b/l.  Cerebellar function intact         LABS:                        12.2   8.36  )-----------( 264      ( 16 May 2022 06:33 )             37.5     05-16    135  |  104  |  30<H>  ----------------------------<  90  4.4   |  18<L>  |  1.04    Ca    9.1      16 May 2022 06:33  Phos  3.9     05-16  Mg     2.1     05-16    TPro  6.5  /  Alb  3.2<L>  /  TBili  1.0  /  DBili  x   /  AST  88<H>  /  ALT  91<H>  /  AlkPhos  254<H>  05-16            I&O's Summary    15 May 2022 07:01  -  16 May 2022 07:00  --------------------------------------------------------  IN: 780 mL / OUT: 0 mL / NET: 780 mL    16 May 2022 07:01  -  16 May 2022 12:11  --------------------------------------------------------  IN: 360 mL / OUT: 0 mL / NET: 360 mL      BNP  RADIOLOGY & ADDITIONAL STUDIES: Cardiology Consult Note   Justo Xavier  Internal Medicine Resident on Cardiology Consult Service   [phone] - [facility] [id number]    Patient is a 77y old  Male who presents with a chief complaint of Cardiogenic Shock (16 May 2022 07:46)      HPI:  76 yo M w/ history of bipolar disorder, anxiety and BPH coming in as a transfer from [facility] for further cardiac intervention. Patient presented to the hospital for intermittent SOB on exertion for two weeks associated with nausea, palpitations and chest pressure that self resolved. Patient typically active; swimming 3x a week at baseline but notes worsened fatigue while swimming. Found to have abnormal EKG concerning for impending occlusion and was transferred for catheterization.   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  (11 May 2022 17:36)      PAST MEDICAL & SURGICAL HISTORY:  Depression      Constipation      Urinary retention      History of hernia repair             MEDICATIONS  (STANDING):  aspirin enteric coated 81 milliGRAM(s) Oral daily  atorvastatin 80 milliGRAM(s) Oral at bedtime  chlorhexidine 4% Liquid 1 Application(s) Topical <User Schedule>  clopidogrel Tablet 75 milliGRAM(s) Oral daily  hydrALAZINE 50 milliGRAM(s) Oral every 8 hours  isosorbide   dinitrate Tablet (ISORDIL) 25 milliGRAM(s) Oral three times a day  metoprolol tartrate 50 milliGRAM(s) Oral two times a day  mirtazapine 7.5 milliGRAM(s) Oral at bedtime  polyethylene glycol 3350 17 Gram(s) Oral daily  senna 2 Tablet(s) Oral at bedtime  tamsulosin 0.4 milliGRAM(s) Oral at bedtime  venlafaxine XR. 150 milliGRAM(s) Oral daily    MEDICATIONS  (PRN):  clonazePAM Oral Disintegrating Tablet 0.25 milliGRAM(s) Oral three times a day PRN severe anxiety  ondansetron Injectable 4 milliGRAM(s) IV Push every 6 hours PRN Nausea and/or Vomiting      FAMILY HISTORY:      SOCIAL HISTORY:    Vital Signs Last 24 Hrs  T(C): 36.7 (16 May 2022 11:00), Max: 36.7 (16 May 2022 11:00)  T(F): 98.1 (16 May 2022 11:00), Max: 98.1 (16 May 2022 11:00)  HR: 89 (16 May 2022 11:00) (75 - 96)  BP: 107/69 (16 May 2022 11:00) (107/68 - 124/72)  BP(mean): --  RR: 18 (16 May 2022 11:00) (18 - 18)  SpO2: 95% (16 May 2022 11:00) (93% - 95%)    General: No acute distress.  HEENT: NCAT.  PERRL.  EOMI.  No scleral icterus or injection.  Moist MM.  No oropharyngeal exudates.    Neck: Supple.  Full ROM.  No JVD.  No thyromegaly. No lymphadenopathy.   Heart: RRR.  Normal S1 and S2.  No murmurs, rubs, or gallops.   Lungs: CTAB. No wheezes, crackles, or rhonchi.    Abdomen: BS+, soft, NT/ND.  No organomegaly.  Groin: R Femoral access site without bleeding, erythema, TTP, healing well.  Skin: Warm and dry.  No rashes.  Extremities: No edema, clubbing, or cyanosis.  2+ peripheral pulses b/l.  Musculoskeletal: No deformities.  No spinal or paraspinal tenderness.  Neuro: A&Ox3.  CN II-XII intact.  5/5 strength in UE and LE b/l.  Tactile sensation intact in UE and LE b/l.  Cerebellar function intact         LABS:                        12.2   8.36  )-----------( 264      ( 16 May 2022 06:33 )             37.5     05-16    135  |  104  |  30<H>  ----------------------------<  90  4.4   |  18<L>  |  1.04    Ca    9.1      16 May 2022 06:33  Phos  3.9     05-16  Mg     2.1     05-16    TPro  6.5  /  Alb  3.2<L>  /  TBili  1.0  /  DBili  x   /  AST  88<H>  /  ALT  91<H>  /  AlkPhos  254<H>  05-16            I&O's Summary    15 May 2022 07:01  -  16 May 2022 07:00  --------------------------------------------------------  IN: 780 mL / OUT: 0 mL / NET: 780 mL    16 May 2022 07:01  -  16 May 2022 12:11  --------------------------------------------------------  IN: 360 mL / OUT: 0 mL / NET: 360 mL      BNP  RADIOLOGY & ADDITIONAL STUDIES: Cardiology Consult Note   Justo Xavier  Internal Medicine Resident on Cardiology Consult Service   [phone] - [facility] [id number]    Patient is a 77y old  Male who presents with a chief complaint of Cardiogenic Shock (16 May 2022 07:46)    HPI:  76 yo M w/ history of bipolar disorder, anxiety and BPH coming in as a transfer from [facility] for further cardiac intervention. Patient presented to the hospital for intermittent SOB on exertion for two weeks associated with nausea, palpitations and chest pressure that self resolved. Patient typically active; swimming 3x a week at baseline but notes worsened fatigue while swimming. Found to have abnormal EKG concerning for impending occlusion and was transferred for catheterization.     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  (11 May 2022 17:36)    PAST MEDICAL & SURGICAL HISTORY:  Depression  Constipation  Urinary retention  History of hernia repair    MEDICATIONS  (STANDING):  aspirin enteric coated 81 milliGRAM(s) Oral daily  atorvastatin 80 milliGRAM(s) Oral at bedtime  chlorhexidine 4% Liquid 1 Application(s) Topical <User Schedule>  clopidogrel Tablet 75 milliGRAM(s) Oral daily  hydrALAZINE 50 milliGRAM(s) Oral every 8 hours  isosorbide   dinitrate Tablet (ISORDIL) 25 milliGRAM(s) Oral three times a day  metoprolol tartrate 50 milliGRAM(s) Oral two times a day  mirtazapine 7.5 milliGRAM(s) Oral at bedtime  polyethylene glycol 3350 17 Gram(s) Oral daily  senna 2 Tablet(s) Oral at bedtime  tamsulosin 0.4 milliGRAM(s) Oral at bedtime  venlafaxine XR. 150 milliGRAM(s) Oral daily    MEDICATIONS  (PRN):  clonazePAM Oral Disintegrating Tablet 0.25 milliGRAM(s) Oral three times a day PRN severe anxiety  ondansetron Injectable 4 milliGRAM(s) IV Push every 6 hours PRN Nausea and/or Vomiting      FAMILY HISTORY:  No ileph7p of premature sudden cardiac death.    SOCIAL HISTORY:    Vital Signs Last 24 Hrs  T(C): 36.7 (16 May 2022 11:00), Max: 36.7 (16 May 2022 11:00)  T(F): 98.1 (16 May 2022 11:00), Max: 98.1 (16 May 2022 11:00)  HR: 89 (16 May 2022 11:00) (75 - 96)  BP: 107/69 (16 May 2022 11:00) (107/68 - 124/72)  BP(mean): --  RR: 18 (16 May 2022 11:00) (18 - 18)  SpO2: 95% (16 May 2022 11:00) (93% - 95%)    General: No acute distress.  HEENT: NCAT.  PERRL.  EOMI.  No scleral icterus or injection.  Moist MM.  No oropharyngeal exudates.    Neck: Supple.  Full ROM.  No JVD.  No thyromegaly. No lymphadenopathy.   Heart: RRR.  Normal S1 and S2.  No murmurs, rubs, or gallops.   Lungs: CTAB. No wheezes, crackles, or rhonchi.    Abdomen: BS+, soft, NT/ND.  No organomegaly.  Groin: R Femoral access site without bleeding, erythema, TTP, healing well.  Skin: Warm and dry.  No rashes.  Extremities: No edema, clubbing, or cyanosis.  2+ peripheral pulses b/l.  Musculoskeletal: No deformities.  No spinal or paraspinal tenderness.  Neuro: A&Ox3.  CN II-XII intact.  5/5 strength in UE and LE b/l.  Tactile sensation intact in UE and LE b/l.  Cerebellar function intact     LABS:                        12.2   8.36  )-----------( 264      ( 16 May 2022 06:33 )             37.5     05-16    135  |  104  |  30<H>  ----------------------------<  90  4.4   |  18<L>  |  1.04    Ca    9.1      16 May 2022 06:33  Phos  3.9     05-16  Mg     2.1     05-16    TPro  6.5  /  Alb  3.2<L>  /  TBili  1.0  /  DBili  x   /  AST  88<H>  /  ALT  91<H>  /  AlkPhos  254<H>  05-16    I&O's Summary    15 May 2022 07:01  -  16 May 2022 07:00  --------------------------------------------------------  IN: 780 mL / OUT: 0 mL / NET: 780 mL    16 May 2022 07:01  -  16 May 2022 12:11  --------------------------------------------------------  IN: 360 mL / OUT: 0 mL / NET: 360 mL    BNP  RADIOLOGY & ADDITIONAL STUDIES:

## 2022-05-16 NOTE — PROGRESS NOTE ADULT - PROBLEM SELECTOR PLAN 5
DVT ppx: per VTE assessment, patient is low risk  Diet: DASH  [facility]  communication: updated wife at bedside 5/15 DVT ppx: per VTE assessment, patient is low risk  Diet: DASH  [facility]  communication: updated wife at bedside 5/16

## 2022-05-16 NOTE — PROGRESS NOTE ADULT - PROBLEM SELECTOR PLAN 1
- complicated by cardiogenic shock requiring CCU admission and IABP placement (now removed)  - LHC with significant occlusion pRCA 80%, mRCA 100%, pLAD 80%, diag 70%  - TTE with LV systolic dysfunction w/ EF 35%  - Viability: mid to distal anterior, mid to distal anteroseptal, apical, and inferior walls do not demonstrate significant viability.  The septum demonstrates mild viability. The remaining segments are viable (predominantly the lateral wall only)  - on ASA and clopidogrel  - high dose statin  - lopressor 50mg BID-> transition to metoprolol succinate 100mg when rec by cardiology  - isordil 25 mg TID  - hydralazine 50mg q8H  - will likely eventually transition to ace/arb or arni - complicated by cardiogenic shock requiring CCU admission and IABP placement (now removed)  - LHC with significant occlusion pRCA 80%, mRCA 100%, pLAD 80%, diag 70%  - TTE with LV systolic dysfunction w/ EF 35%  - Viability: mid to distal anterior, mid to distal anteroseptal, apical, and inferior walls do not demonstrate significant viability.  The septum demonstrates mild viability. The remaining segments are viable (predominantly the lateral wall only)  - on ASA and clopidogrel  - DC statin given elevated LFTs  - lopressor 50mg BID-> transition to metoprolol succinate 100mg when rec by cardiology  - isordil 25 mg TID  - losartan 25mg

## 2022-05-16 NOTE — PROGRESS NOTE ADULT - SUBJECTIVE AND OBJECTIVE BOX
Antoni Micheletomer, PGY1    DATE OF SERVICE: 05-16-22 @ 07:46    Patient is a 77y old  Male who presents with a chief complaint of Cardiogenic Shock (15 May 2022 07:11)      SUBJECTIVE / OVERNIGHT EVENTS: Patient had 6 beats of WCT, asymptomatic, BP stable.     Tele reviewed:    MEDICATIONS  (STANDING):  aspirin enteric coated 81 milliGRAM(s) Oral daily  atorvastatin 80 milliGRAM(s) Oral at bedtime  chlorhexidine 4% Liquid 1 Application(s) Topical <User Schedule>  clopidogrel Tablet 75 milliGRAM(s) Oral daily  hydrALAZINE 50 milliGRAM(s) Oral every 8 hours  isosorbide   dinitrate Tablet (ISORDIL) 25 milliGRAM(s) Oral three times a day  metoprolol tartrate 50 milliGRAM(s) Oral two times a day  mirtazapine 7.5 milliGRAM(s) Oral at bedtime  polyethylene glycol 3350 17 Gram(s) Oral daily  senna 2 Tablet(s) Oral at bedtime  tamsulosin 0.4 milliGRAM(s) Oral at bedtime  venlafaxine XR. 150 milliGRAM(s) Oral daily    MEDICATIONS  (PRN):  clonazePAM Oral Disintegrating Tablet 0.25 milliGRAM(s) Oral three times a day PRN severe anxiety  ondansetron Injectable 4 milliGRAM(s) IV Push every 6 hours PRN Nausea and/or Vomiting      Vital Signs Last 24 Hrs  T(C): 36.3 (16 May 2022 04:52), Max: 36.5 (15 May 2022 11:58)  T(F): 97.4 (16 May 2022 04:52), Max: 97.7 (15 May 2022 11:58)  HR: 88 (16 May 2022 04:52) (68 - 96)  BP: 124/72 (16 May 2022 04:52) (96/53 - 124/72)  BP(mean): --  RR: 18 (16 May 2022 04:52) (18 - 18)  SpO2: 94% (16 May 2022 04:52) (93% - 94%)  CAPILLARY BLOOD GLUCOSE        I&O's Summary    15 May 2022 07:01  -  16 May 2022 07:00  --------------------------------------------------------  IN: 780 mL / OUT: 0 mL / NET: 780 mL        PHYSICAL EXAM:  Constitutional: NAD, well-groomed, well-developed  HEENT: PERRLA, EOMI, no drainage or redness  Neck: supple,  No JVD  Respiratory: Breath Sounds equal & clear bilaterally to auscultation, no rales/rhonchi/wheezing, no accessory muscle use noted  Cardiovascular: Regular rate, regular rhythm, normal S1, S2; no murmurs or rub  Gastrointestinal: Soft, non-tender, non distended, + bowel sounds  Extremities: GARCIA x 4, no peripheral edema, no cyanosis, no clubbing   Neurological: A+O x 3; speech clear and intact; no sensory, motor  deficits, normal reflexes  Skin: warm, dry, well perfused  Incisions: R Femoral: IABP removed; site is clean/dry/intact w/o surround erythema, non tender to active bleeding    LABS:                        12.2   8.36  )-----------( 264      ( 16 May 2022 06:33 )             37.5     05-16    135  |  104  |  30<H>  ----------------------------<  90  4.4   |  18<L>  |  1.04    Ca    9.1      16 May 2022 06:33  Phos  3.9     05-16  Mg     2.1     05-16    TPro  6.5  /  Alb  3.2<L>  /  TBili  1.0  /  DBili  x   /  AST  88<H>  /  ALT  91<H>  /  AlkPhos  254<H>  05-16              RADIOLOGY & ADDITIONAL TESTS:    Imaging Personally Reviewed:    Consultant(s) Notes Reviewed:      Care Discussed with Consultants/Other Providers:   Antoni Micheletomer, PGY1    DATE OF SERVICE: 05-16-22 @ 07:46    Patient is a 77y old  Male who presents with a chief complaint of Cardiogenic Shock (15 May 2022 07:11)      SUBJECTIVE / OVERNIGHT EVENTS: Patient had 6 beats of WCT, asymptomatic, BP stable. Patient doing well. Notes nausea improving. No chest pain. Dyspnea on exertion after walking 10+min.     Tele reviewed: sinus 70-90s. 6 beats of WCT.     MEDICATIONS  (STANDING):  aspirin enteric coated 81 milliGRAM(s) Oral daily  atorvastatin 80 milliGRAM(s) Oral at bedtime  chlorhexidine 4% Liquid 1 Application(s) Topical <User Schedule>  clopidogrel Tablet 75 milliGRAM(s) Oral daily  hydrALAZINE 50 milliGRAM(s) Oral every 8 hours  isosorbide   dinitrate Tablet (ISORDIL) 25 milliGRAM(s) Oral three times a day  metoprolol tartrate 50 milliGRAM(s) Oral two times a day  mirtazapine 7.5 milliGRAM(s) Oral at bedtime  polyethylene glycol 3350 17 Gram(s) Oral daily  senna 2 Tablet(s) Oral at bedtime  tamsulosin 0.4 milliGRAM(s) Oral at bedtime  venlafaxine XR. 150 milliGRAM(s) Oral daily    MEDICATIONS  (PRN):  clonazePAM Oral Disintegrating Tablet 0.25 milliGRAM(s) Oral three times a day PRN severe anxiety  ondansetron Injectable 4 milliGRAM(s) IV Push every 6 hours PRN Nausea and/or Vomiting      Vital Signs Last 24 Hrs  T(C): 36.3 (16 May 2022 04:52), Max: 36.5 (15 May 2022 11:58)  T(F): 97.4 (16 May 2022 04:52), Max: 97.7 (15 May 2022 11:58)  HR: 88 (16 May 2022 04:52) (68 - 96)  BP: 124/72 (16 May 2022 04:52) (96/53 - 124/72)  BP(mean): --  RR: 18 (16 May 2022 04:52) (18 - 18)  SpO2: 94% (16 May 2022 04:52) (93% - 94%)  CAPILLARY BLOOD GLUCOSE        I&O's Summary    15 May 2022 07:01  -  16 May 2022 07:00  --------------------------------------------------------  IN: 780 mL / OUT: 0 mL / NET: 780 mL        PHYSICAL EXAM:  Constitutional: NAD, well-groomed, well-developed  HEENT: PERRLA, EOMI, no drainage or redness  Neck: supple,  No JVD  Respiratory: Breath Sounds equal & clear bilaterally to auscultation, no rales/rhonchi/wheezing, no accessory muscle use noted  Cardiovascular: Regular rate, regular rhythm, normal S1, S2; no murmurs or rub  Gastrointestinal: Soft, non-tender, non distended, + bowel sounds  Extremities: GARCIA x 4, no peripheral edema, no cyanosis, no clubbing   Neurological: A+O x 3; speech clear and intact; no sensory, motor  deficits, normal reflexes  Skin: warm, dry, well perfused  Incisions: R Femoral: IABP removed; site is clean/dry/intact w/o surround erythema, non tender to active bleeding    LABS:                        12.2   8.36  )-----------( 264      ( 16 May 2022 06:33 )             37.5     05-16    135  |  104  |  30<H>  ----------------------------<  90  4.4   |  18<L>  |  1.04    Ca    9.1      16 May 2022 06:33  Phos  3.9     05-16  Mg     2.1     05-16    TPro  6.5  /  Alb  3.2<L>  /  TBili  1.0  /  DBili  x   /  AST  88<H>  /  ALT  91<H>  /  AlkPhos  254<H>  05-16              RADIOLOGY & ADDITIONAL TESTS:    Imaging Personally Reviewed:    Consultant(s) Notes Reviewed:      Care Discussed with Consultants/Other Providers:

## 2022-05-16 NOTE — PROGRESS NOTE ADULT - ASSESSMENT
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 Admitted to medicine for further medical management.

## 2022-05-16 NOTE — CONSULT NOTE ADULT - ATTENDING COMMENTS
77 year old male with past medical history of HLD, bipolar disorder, anxiety, and BPH presenting with STEMI s/p C 5/11 remarkable for complete occlusion of RCA, 70% occlusion of LAD, CI 1.7, s/p IABP, undergoing viability scan with minimal viability in the infarcted territories; therefore no intervention planned and will continue management of HFrEF with GDMT titration.     Modifications made in bold above.    Laurence Dang MD, MPH, KI, RPVI, [facility]  Inpatient Cardiovascular Specialist; Corina Haro [facility], [facility] ([facility])  ; [employer] at [facility]  message: [employer] [phone] or Microsoft Teams (text preferred and/or call)  email: [email or web address] and Cardiovascular Surgery on-service contact/call information, go to [email or web address] and use "[email or web address]" to login.  Outpatient Cardiology appointments, call  [phone] to arrange with a colleague; I do not have outpatient Cardiology clinic.

## 2022-05-17 ENCOUNTER — TRANSCRIPTION ENCOUNTER (OUTPATIENT)
Age: 77
End: 2022-05-17

## 2022-05-17 VITALS
SYSTOLIC BLOOD PRESSURE: 91 MMHG | OXYGEN SATURATION: 95 % | TEMPERATURE: 97 F | RESPIRATION RATE: 18 BRPM | HEART RATE: 80 BPM | DIASTOLIC BLOOD PRESSURE: 65 MMHG

## 2022-05-17 LAB
ALBUMIN SERPL ELPH-MCNC: 3.5 G/DL — SIGNIFICANT CHANGE UP (ref 3.3–5)
ALP SERPL-CCNC: 299 U/L — HIGH (ref 40–120)
ALT FLD-CCNC: 102 U/L — HIGH (ref 10–45)
ANION GAP SERPL CALC-SCNC: 16 MMOL/L — SIGNIFICANT CHANGE UP (ref 5–17)
AST SERPL-CCNC: 86 U/L — HIGH (ref 10–40)
BILIRUB SERPL-MCNC: 0.9 MG/DL — SIGNIFICANT CHANGE UP (ref 0.2–1.2)
BUN SERPL-MCNC: 37 MG/DL — HIGH (ref 7–23)
CALCIUM SERPL-MCNC: 9.2 MG/DL — SIGNIFICANT CHANGE UP (ref 8.4–10.5)
CHLORIDE SERPL-SCNC: 101 MMOL/L — SIGNIFICANT CHANGE UP (ref 96–108)
CO2 SERPL-SCNC: 17 MMOL/L — LOW (ref 22–31)
CREAT SERPL-MCNC: 1.14 MG/DL — SIGNIFICANT CHANGE UP (ref 0.5–1.3)
EGFR: 66 ML/MIN/1.73M2 — SIGNIFICANT CHANGE UP
GGT SERPL-CCNC: 498 U/L — HIGH (ref 9–50)
GLUCOSE SERPL-MCNC: 103 MG/DL — HIGH (ref 70–99)
HCT VFR BLD CALC: 36.8 % — LOW (ref 39–50)
HGB BLD-MCNC: 11.9 G/DL — LOW (ref 13–17)
MAGNESIUM SERPL-MCNC: 2.2 MG/DL — SIGNIFICANT CHANGE UP (ref 1.6–2.6)
MCHC RBC-ENTMCNC: 31.3 PG — SIGNIFICANT CHANGE UP (ref 27–34)
MCHC RBC-ENTMCNC: 32.3 GM/DL — SIGNIFICANT CHANGE UP (ref 32–36)
MCV RBC AUTO: 96.8 FL — SIGNIFICANT CHANGE UP (ref 80–100)
NRBC # BLD: 0 /100 WBCS — SIGNIFICANT CHANGE UP (ref 0–0)
PHOSPHATE SERPL-MCNC: 4.2 MG/DL — SIGNIFICANT CHANGE UP (ref 2.5–4.5)
PLATELET # BLD AUTO: 266 K/UL — SIGNIFICANT CHANGE UP (ref 150–400)
POTASSIUM SERPL-MCNC: 4.8 MMOL/L — SIGNIFICANT CHANGE UP (ref 3.5–5.3)
POTASSIUM SERPL-SCNC: 4.8 MMOL/L — SIGNIFICANT CHANGE UP (ref 3.5–5.3)
PROT SERPL-MCNC: 6.8 G/DL — SIGNIFICANT CHANGE UP (ref 6–8.3)
RBC # BLD: 3.8 M/UL — LOW (ref 4.2–5.8)
RBC # FLD: 14.8 % — HIGH (ref 10.3–14.5)
SODIUM SERPL-SCNC: 134 MMOL/L — LOW (ref 135–145)
WBC # BLD: 8.29 K/UL — SIGNIFICANT CHANGE UP (ref 3.8–10.5)
WBC # FLD AUTO: 8.29 K/UL — SIGNIFICANT CHANGE UP (ref 3.8–10.5)

## 2022-05-17 PROCEDURE — 99239 HOSP IP/OBS DSCHRG MGMT >30: CPT

## 2022-05-17 PROCEDURE — 99232 SBSQ HOSP IP/OBS MODERATE 35: CPT

## 2022-05-17 RX ORDER — CLONAZEPAM 1 MG
1 TABLET ORAL
Qty: 21 | Refills: 0
Start: 2022-05-17 | End: 2022-05-23

## 2022-05-17 RX ORDER — ONDANSETRON 8 MG/1
1 TABLET, FILM COATED ORAL
Qty: 45 | Refills: 0
Start: 2022-05-17 | End: 2022-05-31

## 2022-05-17 RX ORDER — MIRTAZAPINE 45 MG/1
1 TABLET, ORALLY DISINTEGRATING ORAL
Qty: 30 | Refills: 0
Start: 2022-05-17 | End: 2022-06-15

## 2022-05-17 RX ORDER — LOSARTAN POTASSIUM 100 MG/1
1 TABLET, FILM COATED ORAL
Qty: 30 | Refills: 1
Start: 2022-05-17 | End: 2022-07-15

## 2022-05-17 RX ORDER — METOPROLOL TARTRATE 50 MG
3 TABLET ORAL
Qty: 90 | Refills: 0
Start: 2022-05-17 | End: 2022-06-15

## 2022-05-17 RX ORDER — VENLAFAXINE HCL 75 MG
1 CAPSULE, EXT RELEASE 24 HR ORAL
Qty: 30 | Refills: 0
Start: 2022-05-17 | End: 2022-06-15

## 2022-05-17 RX ORDER — METOPROLOL TARTRATE 50 MG
150 TABLET ORAL DAILY
Refills: 0 | Status: DISCONTINUED | OUTPATIENT
Start: 2022-05-18 | End: 2022-05-17

## 2022-05-17 RX ORDER — MIRTAZAPINE 45 MG/1
2 TABLET, ORALLY DISINTEGRATING ORAL
Qty: 60 | Refills: 0
Start: 2022-05-17 | End: 2022-06-15

## 2022-05-17 RX ORDER — METOPROLOL TARTRATE 50 MG
50 TABLET ORAL ONCE
Refills: 0 | Status: COMPLETED | OUTPATIENT
Start: 2022-05-17 | End: 2022-05-17

## 2022-05-17 RX ORDER — CLONAZEPAM 1 MG
0.5 TABLET ORAL
Refills: 0 | Status: DISCONTINUED | OUTPATIENT
Start: 2022-05-17 | End: 2022-05-17

## 2022-05-17 RX ORDER — ASPIRIN/CALCIUM CARB/MAGNESIUM 324 MG
1 TABLET ORAL
Qty: 0 | Refills: 0 | DISCHARGE
Start: 2022-05-17 | End: 2022-05-31

## 2022-05-17 RX ORDER — CX-024414 0.2 MG/ML
0.25 INJECTION, SUSPENSION INTRAMUSCULAR ONCE
Refills: 0 | Status: COMPLETED | OUTPATIENT
Start: 2022-05-17 | End: 2022-05-17

## 2022-05-17 RX ORDER — ASPIRIN/CALCIUM CARB/MAGNESIUM 324 MG
1 TABLET ORAL
Qty: 0 | Refills: 0 | DISCHARGE
Start: 2022-05-17

## 2022-05-17 RX ORDER — CLOPIDOGREL BISULFATE 75 MG/1
1 TABLET, FILM COATED ORAL
Qty: 30 | Refills: 0
Start: 2022-05-17 | End: 2022-06-15

## 2022-05-17 RX ORDER — CLONAZEPAM 1 MG
1 TABLET ORAL
Qty: 60 | Refills: 0
Start: 2022-05-17 | End: 2022-06-15

## 2022-05-17 RX ORDER — MIRTAZAPINE 45 MG/1
1 TABLET, ORALLY DISINTEGRATING ORAL
Qty: 0 | Refills: 0 | DISCHARGE
Start: 2022-05-17 | End: 2022-06-15

## 2022-05-17 RX ADMIN — CX-024414 0.25 MILLILITER(S): 0.2 INJECTION, SUSPENSION INTRAMUSCULAR at 14:07

## 2022-05-17 RX ADMIN — CLOPIDOGREL BISULFATE 75 MILLIGRAM(S): 75 TABLET, FILM COATED ORAL at 11:16

## 2022-05-17 RX ADMIN — Medication 50 MILLIGRAM(S): at 08:29

## 2022-05-17 RX ADMIN — CHLORHEXIDINE GLUCONATE 1 APPLICATION(S): 213 SOLUTION TOPICAL at 05:32

## 2022-05-17 RX ADMIN — Medication 100 MILLIGRAM(S): at 05:50

## 2022-05-17 RX ADMIN — POLYETHYLENE GLYCOL 3350 17 GRAM(S): 17 POWDER, FOR SOLUTION ORAL at 11:17

## 2022-05-17 RX ADMIN — Medication 150 MILLIGRAM(S): at 05:50

## 2022-05-17 RX ADMIN — LOSARTAN POTASSIUM 25 MILLIGRAM(S): 100 TABLET, FILM COATED ORAL at 05:50

## 2022-05-17 RX ADMIN — Medication 81 MILLIGRAM(S): at 11:16

## 2022-05-17 RX ADMIN — ISOSORBIDE DINITRATE 25 MILLIGRAM(S): 5 TABLET ORAL at 05:50

## 2022-05-17 NOTE — PROGRESS NOTE ADULT - ASSESSMENT
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 Admitted to medicine for further medical management. Plan for DC today.

## 2022-05-17 NOTE — DISCHARGE NOTE NURSING/CASE MANAGEMENT/SOCIAL WORK - PATIENT PORTAL LINK FT
You can access the FollowMyHealth Patient Portal offered by [facility] by registering at the following website: [email or web address]. By joining [facility]’s FollowMyHealth portal, you will also be able to view your health information using other applications (apps) compatible with our system.

## 2022-05-17 NOTE — PROGRESS NOTE ADULT - REASON FOR ADMISSION
Cardiogenic Shock

## 2022-05-17 NOTE — DISCHARGE NOTE PROVIDER - NSFOLLOWUPCLINICS_GEN_ALL_ED_FT
[facility] Psychiatry  Psychiatry  [address] [place] [address]  Phone: [phone]  Fax:   Follow Up Time: 2 weeks

## 2022-05-17 NOTE — BH CONSULTATION LIAISON PROGRESS NOTE - NSBHASSESSMENTFT_PSY_ALL_CORE
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  Psychiatry consulted for anxiety. On initial exam, patient alert/calm/cooperative/oriented and anxious-appearing. Reported refractory depression and anxiety at home and here in the hospital. Reported some passive suicidal ideation in the past month but firmly denies active suicidal ideation/intent/plan. Denied confusion/hallucinations. Stated he wishes to resume his home medications in the hospital.    5/16: Patient reassessed. Sitting up and appearing brighter with more reactive less constricted affect. Reports improvement in anxiety with resumption of mirtazapine. Denies suicidality/homicidality/confusion/psychosis. Discussed short-term role of small dose of clonazepam PRN for panic/severe anxiety. Patient expresses understanding. Considering transferring care to [facility].    RECOMMENDATIONS:  1) C/w venlafaxine XR 150mg PO qD and mirtazapine 7.5mg PO qHS.   2) Changed to clonazepam 0.5mg BID and  0.25mgTID PRN for breakthrough anxiety.  3)  Psych to follow. No psychiatric contraindication for discharge to outpatient level of care. Patient and wife provided with [facility] clinic information; please provide clinic number as below for discharge papers. Patient should otherwise follow up with his outpatient psychiatrist.

## 2022-05-17 NOTE — BH CONSULTATION LIAISON PROGRESS NOTE - NSBHCHARTREVIEWVS_PSY_A_CORE FT
Vital Signs Last 24 Hrs  T(C): 36.7 (16 May 2022 11:00), Max: 36.7 (16 May 2022 11:00)  T(F): 98.1 (16 May 2022 11:00), Max: 98.1 (16 May 2022 11:00)  HR: 89 (16 May 2022 11:00) (75 - 96)  BP: 107/69 (16 May 2022 11:00) (107/68 - 124/72)  BP(mean): --  RR: 18 (16 May 2022 11:00) (18 - 18)  SpO2: 95% (16 May 2022 11:00) (93% - 95%)
Vital Signs Last 24 Hrs  T(C): 36.3 (17 May 2022 12:00), Max: 36.8 (17 May 2022 04:59)  T(F): 97.4 (17 May 2022 12:00), Max: 98.2 (17 May 2022 04:59)  HR: 80 (17 May 2022 12:00) (65 - 86)  BP: 91/65 (17 May 2022 12:00) (91/65 - 117/71)  BP(mean): --  RR: 18 (17 May 2022 12:00) (17 - 18)  SpO2: 95% (17 May 2022 12:00) (94% - 96%)

## 2022-05-17 NOTE — DISCHARGE NOTE PROVIDER - NSDCCPTREATMENT_GEN_ALL_CORE_FT
PRINCIPAL PROCEDURE  Procedure: Left heart catheterization  Findings and Treatment: Diagnostic Findings:   Coronary Angiography   The coronary circulation is right dominant.    LM   Left main artery: Angiography shows no disease.    LAD   Proximal left anterior descending: There is a 70 % stenosis. First  diagonal: There is a 70 % stenosis.  CX   Circumflex: Angiography shows no disease.    RCA   Mid right coronary artery: There is a 100 % stenosis.          SECONDARY PROCEDURE  Procedure: Transthoracic echocardiogram  Findings and Treatment: Dimensions:    Normal Values:  LA:     4.0    2.0 - 4.0 cm  Ao:     3.5    2.0 - 3.8 cm  SEPTUM: 1.3    0.6 - 1.2 cm  PWT:    1.3    0.6 - 1.1 cm  LVIDd:  4.4    3.0 - 5.6 cm  LVIDs:  4.0    1.8 - 4.0 cm  Derived variables:  LVMI: 100 g/m2  RWT: 0.59  Fractional short: 9 %  EF (Pizarro Rule): 35 %Doppler Peak Velocity (m/sec):  AoV=1.0  ------------------------------------------------------------------------  Observations:  Mitral Valve: Normal mitral valve.  Flail mitral valve chordae.  Aortic Valve/Aorta: Normal trileaflet aortic valve. Peak  transaortic valve gradient equals 4 mm Hg, estimated aortic  valve area equals 2.9 sqcm. Mild aortic regurgitation.  Peak left ventricular outflow tract gradient equals 2 mm  Hg.  Aortic Root: 3.5 cm.  Left Atrium: Normal left atrium.  LA volume index = 28  cc/m2.  Left Ventricle: Severe segmental left ventricular systolic  dysfunction. No left ventricular thrombus. Endocardial  visualization enhanced with intravenous injection of  Ultrasonic Enhancing Agent (Lumason). The mid anterior  septum, the apical inferior wall, the mid inferolateral  wall, the apical anterior wall, the apical septum, the  apical lateral wall, the basal inferior wall, the mid  inferior wall, the mid inferoseptum, and the basal  inferoseptum are akinetic.  Mild concentric left  ventricular hypertrophy. Mild diastolic dysfunction (Stage  I).  Right Heart: Normal right atrium. Normal right ventricular  size and function. Normal tricuspid valve. Mild tricuspid  regurgitation. Normal pulmonic valve.  Pericardium/Pleura: Normal pericardium with trace  pericardial effusion.  Bilateral pleural effusions.  Hemodynamic: Estimated right atrial pressure is 8 mm Hg.  Estimated right ventricular systolic pressure equals 42 mm  Hg, assuming right atrial pressure equals 8 mm Hg,  consistent with mild pulmonary hypertension. Color Doppler  demonstrates no evidence of a patent foramen ovale.  ------------------------------------------------------------------------  Conclusions:  1. Normal trileaflet aortic valve. Mil

## 2022-05-17 NOTE — DISCHARGE NOTE NURSING/CASE MANAGEMENT/SOCIAL WORK - NSTOBACCONEVERSMOKERY/N_GEN_A
Patient is aware of results no further concerns. Patient is already scheduled for B/P check on 4/18/22 and scheduled for labs on 6/28/22.    No

## 2022-05-17 NOTE — DISCHARGE NOTE PROVIDER - NSDCMRMEDTOKEN_GEN_ALL_CORE_FT
Bactrim  mg-160 mg oral tablet: 1 tab(s) orally every 12 hours   Effexor: 150 milligram(s) orally once a day  Flomax 0.4 mg oral capsule: 1 cap(s) orally once a day  SEROquel 25 mg oral tablet: 1 tab(s) orally once a day (at bedtime)   aspirin 81 mg oral delayed release tablet: 1 tab(s) orally once a day  clonazePAM 0.25 mg oral tablet, disintegratin tab(s) orally every 8 hours, As Needed MDD:0.75  clopidogrel 75 mg oral tablet: 1 tab(s) orally once a day  Effexor  mg oral capsule, extended release: 1 cap(s) orally once a day   Flomax 0.4 mg oral capsule: 1 cap(s) orally once a day  losartan 25 mg oral tablet: 1 tab(s) orally once a day  metoprolol succinate 50 mg oral tablet, extended release: 3 tab(s) orally once a day  mirtazapine 7.5 mg oral tablet: 1 tab(s) orally once a day (at bedtime)  Zofran 4 mg oral tablet: 1 tab(s) orally every 8 hours, As Needed -for nausea    aspirin 81 mg oral delayed release tablet: 1 tab(s) orally once a day  clonazePAM 0.25 mg oral tablet, disintegratin tab(s) orally every 8 hours, As Needed MDD:3 pills  clopidogrel 75 mg oral tablet: 1 tab(s) orally once a day  Effexor  mg oral capsule, extended release: 1 cap(s) orally once a day   Flomax 0.4 mg oral capsule: 1 cap(s) orally once a day  losartan 25 mg oral tablet: 1 tab(s) orally once a day  metoprolol succinate 50 mg oral tablet, extended release: 3 tab(s) orally once a day  mirtazapine 7.5 mg oral tablet: 1 tab(s) orally once a day (at bedtime)  Zofran 4 mg oral tablet: 1 tab(s) orally every 8 hours, As Needed -for nausea    aspirin 81 mg oral delayed release tablet: 1 tab(s) orally once a day  clonazePAM 0.25 mg oral tablet, disintegratin tab(s) orally every 8 hours, As Needed MDD:3 pills  clopidogrel 75 mg oral tablet: 1 tab(s) orally once a day  Effexor  mg oral capsule, extended release: 1 cap(s) orally once a day   Flomax 0.4 mg oral capsule: 1 cap(s) orally once a day  losartan 25 mg oral tablet: 1 tab(s) orally once a day  metoprolol succinate 50 mg oral tablet, extended release: 3 tab(s) orally once a day  mirtazapine 7.5 mg oral tablet: 2 tab(s) orally once a day (at bedtime)   Zofran 4 mg oral tablet: 1 tab(s) orally every 8 hours, As Needed -for nausea    aspirin 81 mg oral delayed release tablet: 1 tab(s) orally once a day  clonazePAM 0.25 mg oral tablet, disintegratin tab(s) orally every 8 hours, As Needed MDD:3 pills  clonazePAM 0.5 mg oral tablet: 1 tab(s) orally 2 times a day x 30 days MDD:1mg  anxiety  clopidogrel 75 mg oral tablet: 1 tab(s) orally once a day  Effexor  mg oral capsule, extended release: 1 cap(s) orally once a day   Flomax 0.4 mg oral capsule: 1 cap(s) orally once a day  losartan 25 mg oral tablet: 1 tab(s) orally once a day  metoprolol succinate 50 mg oral tablet, extended release: 3 tab(s) orally once a day  mirtazapine 7.5 mg oral tablet: 2 tab(s) orally once a day (at bedtime)   Outpatient physical therapy: for inferior wall STEMI ICD 10-CM I21.19  Zofran 4 mg oral tablet: 1 tab(s) orally every 8 hours, As Needed -for nausea

## 2022-05-17 NOTE — PROGRESS NOTE ADULT - SUBJECTIVE AND OBJECTIVE BOX
Antoni Micheletomer, PGY1    DATE OF SERVICE: 05-17-22 @ 07:44    Patient is a 77y old  Male who presents with a chief complaint of Cardiogenic Shock (16 May 2022 12:05)      SUBJECTIVE / OVERNIGHT EVENTS: No acute events overnight.    Tele Reviewed:     MEDICATIONS  (STANDING):  aspirin enteric coated 81 milliGRAM(s) Oral daily  chlorhexidine 4% Liquid 1 Application(s) Topical <User Schedule>  clonazePAM Oral Disintegrating Tablet 0.5 milliGRAM(s) Oral at bedtime  clopidogrel Tablet 75 milliGRAM(s) Oral daily  isosorbide   dinitrate Tablet (ISORDIL) 25 milliGRAM(s) Oral three times a day  losartan 25 milliGRAM(s) Oral daily  metoprolol succinate  milliGRAM(s) Oral daily  mirtazapine 7.5 milliGRAM(s) Oral at bedtime  polyethylene glycol 3350 17 Gram(s) Oral daily  senna 2 Tablet(s) Oral at bedtime  tamsulosin 0.4 milliGRAM(s) Oral at bedtime  venlafaxine XR. 150 milliGRAM(s) Oral daily    MEDICATIONS  (PRN):  clonazePAM  Tablet 0.25 milliGRAM(s) Oral every 8 hours PRN anxiety  ondansetron Injectable 4 milliGRAM(s) IV Push every 6 hours PRN Nausea and/or Vomiting      Vital Signs Last 24 Hrs  T(C): 36.8 (17 May 2022 04:59), Max: 36.8 (17 May 2022 04:59)  T(F): 98.2 (17 May 2022 04:59), Max: 98.2 (17 May 2022 04:59)  HR: 65 (17 May 2022 04:59) (65 - 89)  BP: 110/62 (17 May 2022 04:59) (94/66 - 110/62)  BP(mean): --  RR: 18 (17 May 2022 04:59) (17 - 18)  SpO2: 96% (17 May 2022 04:59) (94% - 96%)  CAPILLARY BLOOD GLUCOSE        I&O's Summary    16 May 2022 07:01  -  17 May 2022 07:00  --------------------------------------------------------  IN: 1080 mL / OUT: 0 mL / NET: 1080 mL        PHYSICAL EXAM:  Constitutional: NAD, well-groomed, well-developed  HEENT: PERRLA, EOMI, no drainage or redness  Neck: supple,  No JVD  Respiratory: Breath Sounds equal & clear bilaterally to auscultation, no rales/rhonchi/wheezing, no accessory muscle use noted  Cardiovascular: Regular rate, regular rhythm, normal S1, S2; no murmurs or rub  Gastrointestinal: Soft, non-tender, non distended, + bowel sounds  Extremities: GARCIA x 4, no peripheral edema, no cyanosis, no clubbing   Neurological: A+O x 3; speech clear and intact; no sensory, motor  deficits, normal reflexes  Skin: warm, dry, well perfused  Incisions: R Femoral: IABP removed; site is clean/dry/intact w/o surround erythema, non tender to active bleeding    LABS:                        11.9   8.29  )-----------( 266      ( 17 May 2022 05:49 )             36.8     05-17    134<L>  |  101  |  37<H>  ----------------------------<  103<H>  4.8   |  17<L>  |  1.14    Ca    9.2      17 May 2022 05:52  Phos  4.2     05-17  Mg     2.2     05-17    TPro  6.8  /  Alb  3.5  /  TBili  0.9  /  DBili  x   /  AST  86<H>  /  ALT  102<H>  /  AlkPhos  299<H>  05-17              RADIOLOGY & ADDITIONAL TESTS:    Imaging Personally Reviewed:    Consultant(s) Notes Reviewed:      Care Discussed with Consultants/Other Providers:   Antoni Headley, PGY1    DATE OF SERVICE: 05-17-22 @ 07:44    Patient is a 77y old  Male who presents with a chief complaint of Cardiogenic Shock (16 May 2022 12:05)      SUBJECTIVE / OVERNIGHT EVENTS: No acute events overnight. Patient feels well. Slight nausea. No chest pain, dyspnea, abdominal pain, n/v.     Tele Reviewed: sinus 80-100s, PVC    MEDICATIONS  (STANDING):  aspirin enteric coated 81 milliGRAM(s) Oral daily  chlorhexidine 4% Liquid 1 Application(s) Topical <User Schedule>  clonazePAM Oral Disintegrating Tablet 0.5 milliGRAM(s) Oral at bedtime  clopidogrel Tablet 75 milliGRAM(s) Oral daily  isosorbide   dinitrate Tablet (ISORDIL) 25 milliGRAM(s) Oral three times a day  losartan 25 milliGRAM(s) Oral daily  metoprolol succinate  milliGRAM(s) Oral daily  mirtazapine 7.5 milliGRAM(s) Oral at bedtime  polyethylene glycol 3350 17 Gram(s) Oral daily  senna 2 Tablet(s) Oral at bedtime  tamsulosin 0.4 milliGRAM(s) Oral at bedtime  venlafaxine XR. 150 milliGRAM(s) Oral daily    MEDICATIONS  (PRN):  clonazePAM  Tablet 0.25 milliGRAM(s) Oral every 8 hours PRN anxiety  ondansetron Injectable 4 milliGRAM(s) IV Push every 6 hours PRN Nausea and/or Vomiting      Vital Signs Last 24 Hrs  T(C): 36.8 (17 May 2022 04:59), Max: 36.8 (17 May 2022 04:59)  T(F): 98.2 (17 May 2022 04:59), Max: 98.2 (17 May 2022 04:59)  HR: 65 (17 May 2022 04:59) (65 - 89)  BP: 110/62 (17 May 2022 04:59) (94/66 - 110/62)  BP(mean): --  RR: 18 (17 May 2022 04:59) (17 - 18)  SpO2: 96% (17 May 2022 04:59) (94% - 96%)  CAPILLARY BLOOD GLUCOSE        I&O's Summary    16 May 2022 07:01  -  17 May 2022 07:00  --------------------------------------------------------  IN: 1080 mL / OUT: 0 mL / NET: 1080 mL        PHYSICAL EXAM:  Constitutional: NAD, well-groomed, well-developed  HEENT: PERRLA, EOMI, no drainage or redness  Neck: supple,  No JVD  Respiratory: Breath Sounds equal & clear bilaterally to auscultation, no rales/rhonchi/wheezing, no accessory muscle use noted  Cardiovascular: Regular rate, regular rhythm, normal S1, S2; no murmurs or rub  Gastrointestinal: Soft, non-tender, non distended, + bowel sounds  Extremities: GARCIA x 4, no peripheral edema, no cyanosis, no clubbing   Neurological: A+O x 3; speech clear and intact; no sensory, motor  deficits, normal reflexes  Skin: warm, dry, well perfused  Incisions: R Femoral: IABP removed; site is clean/dry/intact w/o surround erythema, non tender to active bleeding    LABS:                        11.9   8.29  )-----------( 266      ( 17 May 2022 05:49 )             36.8     05-17    134<L>  |  101  |  37<H>  ----------------------------<  103<H>  4.8   |  17<L>  |  1.14    Ca    9.2      17 May 2022 05:52  Phos  4.2     05-17  Mg     2.2     05-17    TPro  6.8  /  Alb  3.5  /  TBili  0.9  /  DBili  x   /  AST  86<H>  /  ALT  102<H>  /  AlkPhos  299<H>  05-17              RADIOLOGY & ADDITIONAL TESTS:    Imaging Personally Reviewed:    Consultant(s) Notes Reviewed:      Care Discussed with Consultants/Other Providers:

## 2022-05-17 NOTE — BH CONSULTATION LIAISON PROGRESS NOTE - CURRENT MEDICATION
MEDICATIONS  (STANDING):  aspirin enteric coated 81 milliGRAM(s) Oral daily  chlorhexidine 4% Liquid 1 Application(s) Topical <User Schedule>  clonazePAM Oral Disintegrating Tablet 0.5 milliGRAM(s) Oral two times a day  clopidogrel Tablet 75 milliGRAM(s) Oral daily  coronavirus (EUA) Booster Vaccine (MODERNA) 0.25 milliLiter(s) IntraMuscular once  losartan 25 milliGRAM(s) Oral daily  mirtazapine 7.5 milliGRAM(s) Oral at bedtime  polyethylene glycol 3350 17 Gram(s) Oral daily  senna 2 Tablet(s) Oral at bedtime  tamsulosin 0.4 milliGRAM(s) Oral at bedtime  venlafaxine XR. 150 milliGRAM(s) Oral daily    MEDICATIONS  (PRN):  clonazePAM  Tablet 0.25 milliGRAM(s) Oral every 8 hours PRN anxiety  ondansetron Injectable 4 milliGRAM(s) IV Push every 6 hours PRN Nausea and/or Vomiting  
MEDICATIONS  (STANDING):  aspirin enteric coated 81 milliGRAM(s) Oral daily  atorvastatin 80 milliGRAM(s) Oral at bedtime  chlorhexidine 4% Liquid 1 Application(s) Topical <User Schedule>  clopidogrel Tablet 75 milliGRAM(s) Oral daily  hydrALAZINE 50 milliGRAM(s) Oral every 8 hours  isosorbide   dinitrate Tablet (ISORDIL) 25 milliGRAM(s) Oral three times a day  metoprolol tartrate 50 milliGRAM(s) Oral two times a day  mirtazapine 7.5 milliGRAM(s) Oral at bedtime  polyethylene glycol 3350 17 Gram(s) Oral daily  senna 2 Tablet(s) Oral at bedtime  tamsulosin 0.4 milliGRAM(s) Oral at bedtime  venlafaxine XR. 150 milliGRAM(s) Oral daily    MEDICATIONS  (PRN):  clonazePAM Oral Disintegrating Tablet 0.25 milliGRAM(s) Oral three times a day PRN severe anxiety  ondansetron Injectable 4 milliGRAM(s) IV Push every 6 hours PRN Nausea and/or Vomiting

## 2022-05-17 NOTE — PROGRESS NOTE ADULT - PROBLEM SELECTOR PLAN 1
- complicated by cardiogenic shock requiring CCU admission and IABP placement (now removed)  - LHC with significant occlusion pRCA 80%, mRCA 100%, pLAD 80%, diag 70%  - TTE with LV systolic dysfunction w/ EF 35%  - Viability: mid to distal anterior, mid to distal anteroseptal, apical, and inferior walls do not demonstrate significant viability.  The septum demonstrates mild viability. The remaining segments are viable (predominantly the lateral wall only)  - on ASA and clopidogrel  - DC statin given elevated LFTs  - toprol 150 daily  - losartan 25mg

## 2022-05-17 NOTE — DISCHARGE NOTE PROVIDER - NSDCFUADDAPPT_GEN_ALL_CORE_FT
Please call to schedule appointments with primary care doctor, cardiology, and [facility].

## 2022-05-17 NOTE — BH CONSULTATION LIAISON PROGRESS NOTE - NSBHCONSULTFOLLOWAFTERCARE_PSY_A_CORE FT
Patient can follow up with his outpatient psychiatrist Dr. Yu. Also provided patient and his wife with information for [facility] ([phone]) if patient desires alternate follow up. 
Patient can follow up with his outpatient psychiatrist Dr. Yu. Also provided patient and his wife with information for [facility] ([phone]) if patient desires alternate follow up.

## 2022-05-17 NOTE — BH CONSULTATION LIAISON PROGRESS NOTE - NSBHFUPINTERVALHXFT_PSY_A_CORE
Chart reviewed. Patient received clonazepam 0.5mg PO at bedtime and 0.25mg tabes twice yesterday PRN anxiety. Mirtazapine held on 5/14 for EKG with QTc 508ms, repeat EKGs < 500ms as below and mirtazapine resumed.    Patient seen and examined. Reports overall improvement in anxiety since restarting mirtazapine. Reports improved sleep overnight last night. States he is eager to return home. Denies suicidal/aggressive ideation/intent/plan, confusion, hallucinations, and paranoia. Reviewed use of clonazepam as a sparing PRN for the purposes of bridging to improved control of anxiety with titration of standing medications. Patient states he is considering transferring his outpatient care to the [facility].

## 2022-05-17 NOTE — DISCHARGE NOTE PROVIDER - PROVIDER TOKENS
Spoke with patient and it was the pharmacy requesting the refill. He is still good on metformin.  He also made an appt for 3/6/2020 PROVIDER:[TOKEN:[[id number]],FOLLOWUP:[1 week]]

## 2022-05-17 NOTE — PROGRESS NOTE ADULT - PROBLEM SELECTOR PLAN 5
DVT ppx: per VTE assessment, patient is low risk  Diet: DASH  [facility]; d/c 5/17  communication: updated wife at bedside 5/16 DVT ppx: per VTE assessment, patient is low risk  Diet: DASH  Dispo: admitted to medicine to [facility]; d/c 5/17  communication: updated wife at bedside 5/17, discussed with primary cardiologist Dr. Walden via email

## 2022-05-17 NOTE — PROGRESS NOTE ADULT - PROBLEM SELECTOR PROBLEM 1
ST-segment elevation myocardial infarction (STEMI) of inferior wall

## 2022-05-17 NOTE — DISCHARGE NOTE NURSING/CASE MANAGEMENT/SOCIAL WORK - NSDCVIVACCINE_GEN_ALL_CORE_FT
COVID-19, mRNA, LNP-S, PF, 100 mcg/ 0.5 mL dose (Moderna); 17-May-2022 14:07; Pankaj Velazco (RN); [employer] [id number] (Exp. Date: 05-Jun-2022); IntraMuscular; Deltoid Left.; 0.25 milliLiter(s);

## 2022-05-17 NOTE — DISCHARGE NOTE PROVIDER - CARE PROVIDER_API CALL
Niles Walden)  Cardiology; Internal Medicine  [address], [address]  [place] [address]  Phone: [phone]  Fax: [phone]  Follow Up Time: 1 week

## 2022-05-17 NOTE — PROGRESS NOTE ADULT - ATTENDING COMMENTS
Patient admitted to CICU with cardiogenic shock in the setting of an inferior wall STEMI.  IABP for hemodynamic support, afterload reduction, LV unloading, and coronary perfusion.  He remains on nitroprusside support for afterload reduction.  Wean nitroprusside as tolerated and transition to guideline directed medical therapy for heart failure with reduced ejection fraction.
Patient admitted to CICU with cardiogenic shock in the setting of an inferior wall STEMI.  IABP for hemodynamic support, afterload reduction, LV unloading, and coronary perfusion.  His nitroprusside support for afterload reduction has been converted to oral meds.     Plan to wean and explant IABP today.
Patient seen and examined. Agree with assessment and plan as outlined above.  77 year-old man with history of anxiety disorder and bipolar with NSTEMI with 100% RCA and 70% LAD with newly diagnosed severe ischemic cardiomyopathy. Viability study performed showing predominantly nonviable myocardium in LAD and RCA territories. Patient discussed with interventional cardiology. No role for PCI. Will plan for medical management of newly diagnosed ischemic cardiomyopathy. Plan to wean IABP and discontinue IABP on 5/14. Femoral swan-master catheter to potentially remove on 5/14. On dapt, high intensity statin, On nipride for afterload reduction. Wean nipride. Patient on hydralazine 75 mg TID. Titrate up afterload reduction. IV heparin for DVT prophylaxis. On DASH diet.
D/c home w f/u. D/c time 40 mins.
Transition to Toprol XL, Cozaar started.     D/c planning for tomorrow.
76 yo M w/ history of HLD, bipolar disorder, anxiety and BPH coming in with exertional chest pain and dyspnea found to have complete occlusion of RCA and 70% occlusion of the LAD with a cardiac index of 1.7, s/p CCU stay and IABP, transferred to medicine overnight  Today, still symptomatic with nausea and SOB  Awaiting further cardiology recs, Re: GDMT  Decrease hydralazine to 50mg TID given hypotension  PPT eval  Rest as documented above

## 2022-05-17 NOTE — BH CONSULTATION LIAISON PROGRESS NOTE - CASE SUMMARY
Pt is a 77yr old man with long PPHx depression and anxiety, admitted for chest pain/dyspnea found to have complete RCA occlusion and partial LAD occlusion. [facility] consulted for anxiety.  Patient alert/calm/cooperative/oriented and anxious-appearing. Reporting refractory depression and anxiety at home and here in the hospital. Denies having any thoughts of harming himself or others at this time and has no past suicidal behaviors.  Denies confusion/hallucinations. States he wishes to resume his home medications in the hospital. Agree with changing to Clonazepam 0.5mg at bed time and as a prn and continuing Effexor and Remeron 7.5mg at bedtime.

## 2022-05-17 NOTE — DISCHARGE NOTE PROVIDER - NSDCFUADDINST_GEN_ALL_CORE_FT
Please follow with Primary care doctor and cardiology to follow your liver enzymes as they were elevated in the hospital.

## 2022-05-17 NOTE — DISCHARGE NOTE NURSING/CASE MANAGEMENT/SOCIAL WORK - NSDCPEFALRISK_GEN_ALL_CORE
For information on Fall & Injury Prevention, visit: [email or web address] OR  [email or web address] OR  https://www.cdc.gov/steadi/patient.html

## 2022-05-17 NOTE — DISCHARGE NOTE PROVIDER - NSDCCPCAREPLAN_GEN_ALL_CORE_FT
PRINCIPAL DISCHARGE DIAGNOSIS  Diagnosis: ST elevation myocardial infarction (STEMI) of inferior wall  Assessment and Plan of Treatment: You came into the hospital because you were feeling more short of breath than usual.       PRINCIPAL DISCHARGE DIAGNOSIS  Diagnosis: ST elevation myocardial infarction (STEMI) of inferior wall  Assessment and Plan of Treatment: You came into the hospital because you were feeling more short of breath than usual. you were found to have an acute heart attack.  Left heart catheterization showed 100% occlusion for right coronary artery and 70% occlusion of left anterior descending artery.  The arteries were not intervened upon due to viability study showing no viability in the areas supplied by the occluded arteries. You were in cardiac ICU with an intra-aortic balloon pump in place for a couple days in setting of cardiogenic shock. Ejection fraction on echocardiogram was 35%. You were placed on medications to help your heart pumping function get stronger. Please follow with your cardiologist as outpatient in 1 week. You were not continued on atorvastatin because you liver enzymes were elevated.      SECONDARY DISCHARGE DIAGNOSES  Diagnosis: Anxiety  Assessment and Plan of Treatment: You had some concerns with anxiety during admission. You were seen by psychiatry who helped adjust medications. Please take all medications as prescribed. Please follow with [facility] after discharge for continued managment.

## 2022-05-17 NOTE — PROGRESS NOTE ADULT - PROBLEM SELECTOR PLAN 2
- home effexor  - restart remeron given QTc is now under 500  - clonazepam PRN  - appreciate psych recs

## 2022-05-17 NOTE — DISCHARGE NOTE PROVIDER - HOSPITAL COURSE
HPI:  76 yo M w/ history of bipolar disorder, anxiety and BPH coming in as a transfer from [facility] for further cardiac intervention. Patient presented to the hospital for intermittent SOB on exertion for two weeks associated with nausea, palpitations and chest pressure that self resolved. Patient typically active; swimming 3x a week at baseline but notes worsened fatigue while swimming. Found to have abnormal EKG concerning for impending occlusion and was transferred for catheterization.   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  (11 May 2022 17:36)       HPI:  76 yo M w/ history of bipolar disorder, anxiety and BPH coming in as a transfer from [facility] for further cardiac intervention. Patient presented to the hospital for intermittent SOB on exertion for two weeks associated with nausea, palpitations and chest pressure that self resolved. Patient typically active; swimming 3x a week at baseline but notes worsened fatigue while swimming. Found to have abnormal EKG concerning for impending occlusion and was transferred for catheterization.   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  (11 May 2022 17:36)    Hospital Course:  Mr. Becerra was found to have inferior wall STEMI at time of admission. Left heart catheterization showed 100% occlusion for right coronary artery and 70% occlusion of left anterior descending artery.  The arteries were not intervened upon due to viability study showing no viability in the areas supplied by the occluded arteries. The patient was in cardiac ICU with an intra-aortic balloon pump in place for a couple days in setting of cardiogenic shock. Ejection fraction on TTE was 35%. His condition improved and he was down graded to general medicine floors to uptitrate goal directed medical therapies. He will be discharged on the following cardiac medications:  Aspirin 81mg daily  Clopidogrel 75mg daily  Metoprolol succinate 150mg daily  Losartan 25mg daily (can eventually switch to Entresto)    Atorvastatin was started but then held given rise in LFTs. Can consider restarting as outpatient after repeat blood work. Medications can be further up-titrated as outpatient. Patient hemodynamically stable at time of discharge. He will follow with PCP and cardiologist.

## 2022-05-17 NOTE — PROGRESS NOTE ADULT - PROVIDER SPECIALTY LIST ADULT
Critical Care
BRIGHT
Critical Care
BRIGHT
Internal Medicine

## 2022-05-17 NOTE — BH CONSULTATION LIAISON PROGRESS NOTE - NSBHCHARTREVIEWLAB_PSY_A_CORE FT
CBC Full  -  ( 16 May 2022 06:33 )  WBC Count : 8.36 K/uL  RBC Count : 3.95 M/uL  Hemoglobin : 12.2 g/dL  Hematocrit : 37.5 %  Platelet Count - Automated : 264 K/uL  Mean Cell Volume : 94.9 fl  Mean Cell Hemoglobin : 30.9 pg  Mean Cell Hemoglobin Concentration : 32.5 gm/dL  Auto Neutrophil # : 4.40 K/uL  Auto Lymphocyte # : 2.52 K/uL  Auto Monocyte # : 1.01 K/uL  Auto Eosinophil # : 0.32 K/uL  Auto Basophil # : 0.05 K/uL  Auto Neutrophil % : 52.7 %  Auto Lymphocyte % : 30.1 %  Auto Monocyte % : 12.1 %  Auto Eosinophil % : 3.8 %  Auto Basophil % : 0.6 %    05-16    135  |  104  |  30<H>  ----------------------------<  90  4.4   |  18<L>  |  1.04    Ca   9.1   16 May 2022 06:33 /    Phos  3.9  05-16 /  Mg   2.1   05-16 /  TPro  6.5  /  Alb  3.2<L>  /  TBili  1.0  /  DBili  x   /  AST  88<H>  /  ALT  91<H>  /  AlkPhos  254<H>  05-16  
CBC Full  -  ( 16 May 2022 06:33 )  WBC Count : 8.36 K/uL  RBC Count : 3.95 M/uL  Hemoglobin : 12.2 g/dL  Hematocrit : 37.5 %  Platelet Count - Automated : 264 K/uL  Mean Cell Volume : 94.9 fl  Mean Cell Hemoglobin : 30.9 pg  Mean Cell Hemoglobin Concentration : 32.5 gm/dL  Auto Neutrophil # : 4.40 K/uL  Auto Lymphocyte # : 2.52 K/uL  Auto Monocyte # : 1.01 K/uL  Auto Eosinophil # : 0.32 K/uL  Auto Basophil # : 0.05 K/uL  Auto Neutrophil % : 52.7 %  Auto Lymphocyte % : 30.1 %  Auto Monocyte % : 12.1 %  Auto Eosinophil % : 3.8 %  Auto Basophil % : 0.6 %    05-16    135  |  104  |  30<H>  ----------------------------<  90  4.4   |  18<L>  |  1.04    Ca   9.1   16 May 2022 06:33 /    Phos  3.9  05-16 /  Mg   2.1   05-16 /  TPro  6.5  /  Alb  3.2<L>  /  TBili  1.0  /  DBili  x   /  AST  88<H>  /  ALT  91<H>  /  AlkPhos  254<H>  05-16

## 2022-05-17 NOTE — BH CONSULTATION LIAISON PROGRESS NOTE - NSICDXBHPRIMARYDX_PSY_ALL_CORE
Major depressive disorder, recurrent episode with anxious distress   F33.9  
Major depressive disorder, recurrent episode with anxious distress   F33.9

## 2022-05-19 ENCOUNTER — INPATIENT (INPATIENT)
Facility: HOSPITAL | Age: 77
LOS: 6 days | Discharge: HOME | DRG: 281 | End: 2022-05-26
Attending: STUDENT IN AN ORGANIZED HEALTH CARE EDUCATION/TRAINING PROGRAM | Admitting: STUDENT IN AN ORGANIZED HEALTH CARE EDUCATION/TRAINING PROGRAM
Payer: MEDICARE

## 2022-05-19 ENCOUNTER — NON-APPOINTMENT (OUTPATIENT)
Age: 77
End: 2022-05-19

## 2022-05-19 VITALS
HEART RATE: 66 BPM | HEIGHT: 72 IN | DIASTOLIC BLOOD PRESSURE: 61 MMHG | WEIGHT: 195.11 LBS | RESPIRATION RATE: 16 BRPM | OXYGEN SATURATION: 99 % | SYSTOLIC BLOOD PRESSURE: 84 MMHG | TEMPERATURE: 98 F

## 2022-05-19 DIAGNOSIS — Z98.890 OTHER SPECIFIED POSTPROCEDURAL STATES: Chronic | ICD-10-CM

## 2022-05-19 DIAGNOSIS — R06.00 DYSPNEA, UNSPECIFIED: ICD-10-CM

## 2022-05-19 LAB
ALBUMIN SERPL ELPH-MCNC: 3.5 G/DL — SIGNIFICANT CHANGE UP (ref 3.3–5)
ALP SERPL-CCNC: 284 U/L — HIGH (ref 40–120)
ALT FLD-CCNC: 79 U/L — HIGH (ref 10–45)
ANION GAP SERPL CALC-SCNC: 14 MMOL/L — SIGNIFICANT CHANGE UP (ref 5–17)
ANISOCYTOSIS BLD QL: SLIGHT — SIGNIFICANT CHANGE UP
AST SERPL-CCNC: 60 U/L — HIGH (ref 10–40)
BASOPHILS # BLD AUTO: 0.24 K/UL — HIGH (ref 0–0.2)
BASOPHILS NFR BLD AUTO: 2.6 % — HIGH (ref 0–2)
BILIRUB SERPL-MCNC: 0.9 MG/DL — SIGNIFICANT CHANGE UP (ref 0.2–1.2)
BUN SERPL-MCNC: 36 MG/DL — HIGH (ref 7–23)
BURR CELLS BLD QL SMEAR: PRESENT — SIGNIFICANT CHANGE UP
CALCIUM SERPL-MCNC: 9.1 MG/DL — SIGNIFICANT CHANGE UP (ref 8.4–10.5)
CHLORIDE SERPL-SCNC: 102 MMOL/L — SIGNIFICANT CHANGE UP (ref 96–108)
CO2 SERPL-SCNC: 18 MMOL/L — LOW (ref 22–31)
CREAT SERPL-MCNC: 1.35 MG/DL — HIGH (ref 0.5–1.3)
EGFR: 54 ML/MIN/1.73M2 — LOW
ELLIPTOCYTES BLD QL SMEAR: SLIGHT — SIGNIFICANT CHANGE UP
EOSINOPHIL # BLD AUTO: 0.16 K/UL — SIGNIFICANT CHANGE UP (ref 0–0.5)
EOSINOPHIL NFR BLD AUTO: 1.7 % — SIGNIFICANT CHANGE UP (ref 0–6)
FLUAV AG NPH QL: SIGNIFICANT CHANGE UP
FLUBV AG NPH QL: SIGNIFICANT CHANGE UP
GIANT PLATELETS BLD QL SMEAR: PRESENT — SIGNIFICANT CHANGE UP
GLUCOSE SERPL-MCNC: 134 MG/DL — HIGH (ref 70–99)
HCT VFR BLD CALC: 40.8 % — SIGNIFICANT CHANGE UP (ref 39–50)
HGB BLD-MCNC: 13 G/DL — SIGNIFICANT CHANGE UP (ref 13–17)
LYMPHOCYTES # BLD AUTO: 2.11 K/UL — SIGNIFICANT CHANGE UP (ref 1–3.3)
LYMPHOCYTES # BLD AUTO: 22.6 % — SIGNIFICANT CHANGE UP (ref 13–44)
MACROCYTES BLD QL: SLIGHT — SIGNIFICANT CHANGE UP
MAGNESIUM SERPL-MCNC: 2.3 MG/DL — SIGNIFICANT CHANGE UP (ref 1.6–2.6)
MANUAL SMEAR VERIFICATION: SIGNIFICANT CHANGE UP
MCHC RBC-ENTMCNC: 31.1 PG — SIGNIFICANT CHANGE UP (ref 27–34)
MCHC RBC-ENTMCNC: 31.9 GM/DL — LOW (ref 32–36)
MCV RBC AUTO: 97.6 FL — SIGNIFICANT CHANGE UP (ref 80–100)
MONOCYTES # BLD AUTO: 1.38 K/UL — HIGH (ref 0–0.9)
MONOCYTES NFR BLD AUTO: 14.8 % — HIGH (ref 2–14)
NEUTROPHILS # BLD AUTO: 5.44 K/UL — SIGNIFICANT CHANGE UP (ref 1.8–7.4)
NEUTROPHILS NFR BLD AUTO: 57.4 % — SIGNIFICANT CHANGE UP (ref 43–77)
NEUTS BAND # BLD: 0.9 % — SIGNIFICANT CHANGE UP (ref 0–8)
NT-PROBNP SERPL-SCNC: 8357 PG/ML — HIGH (ref 0–300)
PLAT MORPH BLD: NORMAL — SIGNIFICANT CHANGE UP
PLATELET # BLD AUTO: 304 K/UL — SIGNIFICANT CHANGE UP (ref 150–400)
POIKILOCYTOSIS BLD QL AUTO: SLIGHT — SIGNIFICANT CHANGE UP
POLYCHROMASIA BLD QL SMEAR: SLIGHT — SIGNIFICANT CHANGE UP
POTASSIUM SERPL-MCNC: 5.1 MMOL/L — SIGNIFICANT CHANGE UP (ref 3.5–5.3)
POTASSIUM SERPL-SCNC: 5.1 MMOL/L — SIGNIFICANT CHANGE UP (ref 3.5–5.3)
PROT SERPL-MCNC: 6.8 G/DL — SIGNIFICANT CHANGE UP (ref 6–8.3)
RBC # BLD: 4.18 M/UL — LOW (ref 4.2–5.8)
RBC # FLD: 15.1 % — HIGH (ref 10.3–14.5)
RBC BLD AUTO: ABNORMAL
RSV RNA NPH QL NAA+NON-PROBE: SIGNIFICANT CHANGE UP
SARS-COV-2 RNA SPEC QL NAA+PROBE: SIGNIFICANT CHANGE UP
SODIUM SERPL-SCNC: 134 MMOL/L — LOW (ref 135–145)
TROPONIN T, HIGH SENSITIVITY RESULT: 70 NG/L — HIGH (ref 0–51)
WBC # BLD: 9.33 K/UL — SIGNIFICANT CHANGE UP (ref 3.8–10.5)
WBC # FLD AUTO: 9.33 K/UL — SIGNIFICANT CHANGE UP (ref 3.8–10.5)

## 2022-05-19 PROCEDURE — 86901 BLOOD TYPING SEROLOGIC RH(D): CPT

## 2022-05-19 PROCEDURE — 97161 PT EVAL LOW COMPLEX 20 MIN: CPT

## 2022-05-19 PROCEDURE — A9505: CPT

## 2022-05-19 PROCEDURE — 85025 COMPLETE CBC W/AUTO DIFF WBC: CPT

## 2022-05-19 PROCEDURE — 86850 RBC ANTIBODY SCREEN: CPT

## 2022-05-19 PROCEDURE — 93010 ELECTROCARDIOGRAM REPORT: CPT | Mod: GC

## 2022-05-19 PROCEDURE — 84484 ASSAY OF TROPONIN QUANT: CPT

## 2022-05-19 PROCEDURE — 99285 EMERGENCY DEPT VISIT HI MDM: CPT | Mod: CS,GC

## 2022-05-19 PROCEDURE — 82550 ASSAY OF CK (CPK): CPT

## 2022-05-19 PROCEDURE — 78452 HT MUSCLE IMAGE SPECT MULT: CPT

## 2022-05-19 PROCEDURE — 85520 HEPARIN ASSAY: CPT

## 2022-05-19 PROCEDURE — 80053 COMPREHEN METABOLIC PANEL: CPT

## 2022-05-19 PROCEDURE — C1889: CPT

## 2022-05-19 PROCEDURE — 93010 ELECTROCARDIOGRAM REPORT: CPT

## 2022-05-19 PROCEDURE — 83735 ASSAY OF MAGNESIUM: CPT

## 2022-05-19 PROCEDURE — 82803 BLOOD GASES ANY COMBINATION: CPT

## 2022-05-19 PROCEDURE — C1769: CPT

## 2022-05-19 PROCEDURE — 82977 ASSAY OF GGT: CPT

## 2022-05-19 PROCEDURE — 85260 CLOT FACTOR X STUART-POWER: CPT

## 2022-05-19 PROCEDURE — 86900 BLOOD TYPING SEROLOGIC ABO: CPT

## 2022-05-19 PROCEDURE — 85027 COMPLETE CBC AUTOMATED: CPT

## 2022-05-19 PROCEDURE — 33967 INSERT I-AORT PERCUT DEVICE: CPT

## 2022-05-19 PROCEDURE — 84443 ASSAY THYROID STIM HORMONE: CPT

## 2022-05-19 PROCEDURE — 83036 HEMOGLOBIN GLYCOSYLATED A1C: CPT

## 2022-05-19 PROCEDURE — 86803 HEPATITIS C AB TEST: CPT

## 2022-05-19 PROCEDURE — 84100 ASSAY OF PHOSPHORUS: CPT

## 2022-05-19 PROCEDURE — 93460 R&L HRT ART/VENTRICLE ANGIO: CPT

## 2022-05-19 PROCEDURE — 71045 X-RAY EXAM CHEST 1 VIEW: CPT | Mod: 26

## 2022-05-19 PROCEDURE — 85730 THROMBOPLASTIN TIME PARTIAL: CPT

## 2022-05-19 PROCEDURE — 80061 LIPID PANEL: CPT

## 2022-05-19 PROCEDURE — 82553 CREATINE MB FRACTION: CPT

## 2022-05-19 PROCEDURE — C8929: CPT

## 2022-05-19 PROCEDURE — 93005 ELECTROCARDIOGRAM TRACING: CPT

## 2022-05-19 PROCEDURE — C1887: CPT

## 2022-05-19 PROCEDURE — 71045 X-RAY EXAM CHEST 1 VIEW: CPT

## 2022-05-19 PROCEDURE — C1894: CPT

## 2022-05-19 PROCEDURE — 36415 COLL VENOUS BLD VENIPUNCTURE: CPT

## 2022-05-19 PROCEDURE — 83605 ASSAY OF LACTIC ACID: CPT

## 2022-05-19 RX ORDER — ONDANSETRON 8 MG/1
4 TABLET, FILM COATED ORAL ONCE
Refills: 0 | Status: COMPLETED | OUTPATIENT
Start: 2022-05-19 | End: 2022-05-19

## 2022-05-19 RX ORDER — CLONAZEPAM 1 MG
0.5 TABLET ORAL ONCE
Refills: 0 | Status: DISCONTINUED | OUTPATIENT
Start: 2022-05-19 | End: 2022-05-19

## 2022-05-19 RX ORDER — ACETAMINOPHEN 500 MG
650 TABLET ORAL EVERY 6 HOURS
Refills: 0 | Status: DISCONTINUED | OUTPATIENT
Start: 2022-05-19 | End: 2022-05-26

## 2022-05-19 RX ORDER — SODIUM CHLORIDE 9 MG/ML
250 INJECTION INTRAMUSCULAR; INTRAVENOUS; SUBCUTANEOUS ONCE
Refills: 0 | Status: COMPLETED | OUTPATIENT
Start: 2022-05-19 | End: 2022-05-19

## 2022-05-19 RX ORDER — FUROSEMIDE 40 MG
20 TABLET ORAL ONCE
Refills: 0 | Status: COMPLETED | OUTPATIENT
Start: 2022-05-19 | End: 2022-05-19

## 2022-05-19 RX ADMIN — Medication 0.5 MILLIGRAM(S): at 23:46

## 2022-05-19 RX ADMIN — Medication 20 MILLIGRAM(S): at 19:11

## 2022-05-19 RX ADMIN — SODIUM CHLORIDE 500 MILLILITER(S): 9 INJECTION INTRAMUSCULAR; INTRAVENOUS; SUBCUTANEOUS at 18:11

## 2022-05-19 RX ADMIN — ONDANSETRON 4 MILLIGRAM(S): 8 TABLET, FILM COATED ORAL at 18:11

## 2022-05-19 NOTE — ED ADULT NURSE NOTE - OBJECTIVE STATEMENT
Pt is a 77y M c/o low BP. Pt recently dc'd on Wed after STEMI and low BP. Pt endorses increased sleepiness and feeling SOB upon exertion and some nausea since this morning. Pt denies fever, chills, cough, dizziness, fatigue, weakness, chest pain, abd pain, V/D, urinary symptoms. Pt A&Ox3, breathing unlabored and spontaneous, abd soft nontender nondistended, full strength and ROM. Pt resting in stretcher, placed on cardiac monitor, bed in lowest position, educated on call bell, wife at bedside, aware of plan of care. Comfort and safety measures maintained. Pt is a 77y M c/o low BP. Pt recently dc'd on Wed. Pt endorses increased sleepiness and feeling SOB upon exertion and some nausea since this morning. Pt denies fever, chills, cough, dizziness, fatigue, weakness, chest pain, abd pain, V/D, urinary symptoms. Pt A&Ox3, breathing unlabored and spontaneous, abd soft nontender nondistended, full strength and ROM. Pt resting in stretcher, placed on cardiac monitor, bed in lowest position, educated on call bell, wife at bedside, aware of plan of care. Comfort and safety measures maintained.

## 2022-05-19 NOTE — ED PROVIDER NOTE - PHYSICAL EXAMINATION
Physical Exam:  Gen: NAD, AOx3, non-toxic appearing  Head: NCAT  HEENT: EOMI, PEERLA, normal conjunctiva, tongue midline, oral mucosa moist  Lung: CTAB, no respiratory distress, no wheezes/rhonchi/rales B/L, speaking in full sentences  CV: RRR, no murmurs, rubs or gallops, distal pulses 2+ b/l  Abd: soft, NT, ND  MSK: no visible deformities, ROM normal in UE/LE  Skin: Warm, well perfused, no rash, no leg swelling  Psych: normal affect, calm

## 2022-05-19 NOTE — CHART NOTE - NSCHARTNOTEFT_GEN_A_CORE
Reference #: [id number]  Others' Prescriptions  Patient Name: John Connor Date: 1945  Address: [address] [place] [id number]: Male  Rx Written	Rx Dispensed	Drug	Quantity	Days Supply	Prescriber Name	Prescriber Mely #	Payment Method	Dispenser  05/17/2022	05/17/2022	clonazepam 0.25 mg odt	21	7	[facility]	[id number]	[facility]  05/17/2022	05/17/2022	clonazepam 0.5 mg tablet	60	30	Danya Rebolledo MD	[id number]	[facility]

## 2022-05-19 NOTE — ED PROVIDER NOTE - OBJECTIVE STATEMENT
76 yo M w/ history of bipolar disorder, anxiety and BPH recent admission for NSTEMI, cath showed complete occlusion of RCA and 70% occlusion of the LAD with a cardiac index of 1.7 without viability so no intervention done, EF 35% discharged 2 days ago and returning with persistent dyspnea on exertion, fatigue, occasional mild nausea, poor appetite. BP was consistently in 90's systolic since discharge. Called cards office and told him to return to ED.

## 2022-05-19 NOTE — ED PROVIDER NOTE - CLINICAL SUMMARY MEDICAL DECISION MAKING FREE TEXT BOX
78yo male recent NSTEMI with no cardiac viability no stenting done p/w persistent dyspnea on exertion and hypotension. BP stable from discharge, EKG without elevations. Likely deconditioning from recent NSTEMI as well as poor cardiac function without viability. Labs, fluids, cards consult, reassess. 76yo male recent NSTEMI with no cardiac viability no stenting done p/w persistent dyspnea on exertion and hypotension. BP stable from discharge, EKG without elevations. Likely deconditioning from recent NSTEMI as well as poor cardiac function without viability. Labs, fluids, cards consult, reassess.    FARA Sierra MD: Agree with resident/ACP MDM, assessment and plan as above. Pt with recent STEMI, medically managed, p/w SOB with exertion. Plan as above

## 2022-05-19 NOTE — ED CLERICAL - NS ED CLERK NOTE PRE-ARRIVAL INFORMATION; ADDITIONAL PRE-ARRIVAL INFORMATION
CC/Reason For referral: CYANOSIS; CARDIOMYOPATHY   Preferred Consultant(if applicable):  Who admits for you (if needed):  Do you have documents you would like to fax over?  Would you still like to speak to an ED attending? Y This patient is enrolled in the readmission program and has active care navigation. This patient can be followed up by the care navigation team within 24 hours. To arrange close follow-up or to obtain additional clinical information about this patient, please call the contact number above.      CC/Reason For referral: CYANOSIS; CARDIOMYOPATHY   Preferred Consultant(if applicable):  Who admits for you (if needed):  Do you have documents you would like to fax over?  Would you still like to speak to an ED attending? Y

## 2022-05-20 ENCOUNTER — APPOINTMENT (OUTPATIENT)
Dept: CARE COORDINATION | Facility: HOME HEALTH | Age: 77
End: 2022-05-20

## 2022-05-20 DIAGNOSIS — R79.89 OTHER SPECIFIED ABNORMAL FINDINGS OF BLOOD CHEMISTRY: ICD-10-CM

## 2022-05-20 DIAGNOSIS — Z29.9 ENCOUNTER FOR PROPHYLACTIC MEASURES, UNSPECIFIED: ICD-10-CM

## 2022-05-20 DIAGNOSIS — I50.23 ACUTE ON CHRONIC SYSTOLIC (CONGESTIVE) HEART FAILURE: ICD-10-CM

## 2022-05-20 DIAGNOSIS — I95.9 HYPOTENSION, UNSPECIFIED: ICD-10-CM

## 2022-05-20 DIAGNOSIS — R74.01 ELEVATION OF LEVELS OF LIVER TRANSAMINASE LEVELS: ICD-10-CM

## 2022-05-20 DIAGNOSIS — F32.9 MAJOR DEPRESSIVE DISORDER, SINGLE EPISODE, UNSPECIFIED: ICD-10-CM

## 2022-05-20 LAB
ALBUMIN SERPL ELPH-MCNC: 3.3 G/DL — SIGNIFICANT CHANGE UP (ref 3.3–5)
ALP SERPL-CCNC: 240 U/L — HIGH (ref 40–120)
ALT FLD-CCNC: 71 U/L — HIGH (ref 10–45)
ANION GAP SERPL CALC-SCNC: 13 MMOL/L — SIGNIFICANT CHANGE UP (ref 5–17)
APTT BLD: 120.2 SEC — CRITICAL HIGH (ref 27.5–35.5)
APTT BLD: 28.9 SEC — SIGNIFICANT CHANGE UP (ref 27.5–35.5)
AST SERPL-CCNC: 43 U/L — HIGH (ref 10–40)
BILIRUB SERPL-MCNC: 0.7 MG/DL — SIGNIFICANT CHANGE UP (ref 0.2–1.2)
BUN SERPL-MCNC: 37 MG/DL — HIGH (ref 7–23)
CALCIUM SERPL-MCNC: 8.7 MG/DL — SIGNIFICANT CHANGE UP (ref 8.4–10.5)
CHLORIDE SERPL-SCNC: 103 MMOL/L — SIGNIFICANT CHANGE UP (ref 96–108)
CO2 SERPL-SCNC: 18 MMOL/L — LOW (ref 22–31)
CREAT SERPL-MCNC: 1.33 MG/DL — HIGH (ref 0.5–1.3)
EGFR: 55 ML/MIN/1.73M2 — LOW
GLUCOSE SERPL-MCNC: 114 MG/DL — HIGH (ref 70–99)
HCT VFR BLD CALC: 39.6 % — SIGNIFICANT CHANGE UP (ref 39–50)
HCT VFR BLD CALC: 40.2 % — SIGNIFICANT CHANGE UP (ref 39–50)
HGB BLD-MCNC: 12.5 G/DL — LOW (ref 13–17)
HGB BLD-MCNC: 12.6 G/DL — LOW (ref 13–17)
INR BLD: 1.37 RATIO — HIGH (ref 0.88–1.16)
MAGNESIUM SERPL-MCNC: 2.2 MG/DL — SIGNIFICANT CHANGE UP (ref 1.6–2.6)
MCHC RBC-ENTMCNC: 30.8 PG — SIGNIFICANT CHANGE UP (ref 27–34)
MCHC RBC-ENTMCNC: 31.3 GM/DL — LOW (ref 32–36)
MCHC RBC-ENTMCNC: 31.4 PG — SIGNIFICANT CHANGE UP (ref 27–34)
MCHC RBC-ENTMCNC: 31.6 GM/DL — LOW (ref 32–36)
MCV RBC AUTO: 98.3 FL — SIGNIFICANT CHANGE UP (ref 80–100)
MCV RBC AUTO: 99.5 FL — SIGNIFICANT CHANGE UP (ref 80–100)
NRBC # BLD: 0 /100 WBCS — SIGNIFICANT CHANGE UP (ref 0–0)
NRBC # BLD: 0 /100 WBCS — SIGNIFICANT CHANGE UP (ref 0–0)
PHOSPHATE SERPL-MCNC: 3.7 MG/DL — SIGNIFICANT CHANGE UP (ref 2.5–4.5)
PLATELET # BLD AUTO: 245 K/UL — SIGNIFICANT CHANGE UP (ref 150–400)
PLATELET # BLD AUTO: 268 K/UL — SIGNIFICANT CHANGE UP (ref 150–400)
POTASSIUM SERPL-MCNC: 4.4 MMOL/L — SIGNIFICANT CHANGE UP (ref 3.5–5.3)
POTASSIUM SERPL-SCNC: 4.4 MMOL/L — SIGNIFICANT CHANGE UP (ref 3.5–5.3)
PROT SERPL-MCNC: 6.1 G/DL — SIGNIFICANT CHANGE UP (ref 6–8.3)
PROTHROM AB SERPL-ACNC: 16 SEC — HIGH (ref 10.5–13.4)
RBC # BLD: 3.98 M/UL — LOW (ref 4.2–5.8)
RBC # BLD: 4.09 M/UL — LOW (ref 4.2–5.8)
RBC # FLD: 15.2 % — HIGH (ref 10.3–14.5)
RBC # FLD: 15.3 % — HIGH (ref 10.3–14.5)
SODIUM SERPL-SCNC: 134 MMOL/L — LOW (ref 135–145)
WBC # BLD: 8.53 K/UL — SIGNIFICANT CHANGE UP (ref 3.8–10.5)
WBC # BLD: 8.71 K/UL — SIGNIFICANT CHANGE UP (ref 3.8–10.5)
WBC # FLD AUTO: 8.53 K/UL — SIGNIFICANT CHANGE UP (ref 3.8–10.5)
WBC # FLD AUTO: 8.71 K/UL — SIGNIFICANT CHANGE UP (ref 3.8–10.5)

## 2022-05-20 PROCEDURE — 93306 TTE W/DOPPLER COMPLETE: CPT | Mod: 26

## 2022-05-20 PROCEDURE — 12345: CPT | Mod: NC,GC

## 2022-05-20 PROCEDURE — 99223 1ST HOSP IP/OBS HIGH 75: CPT

## 2022-05-20 PROCEDURE — 99223 1ST HOSP IP/OBS HIGH 75: CPT | Mod: GC

## 2022-05-20 RX ORDER — HEPARIN SODIUM 5000 [USP'U]/ML
5000 INJECTION INTRAVENOUS; SUBCUTANEOUS EVERY 8 HOURS
Refills: 0 | Status: DISCONTINUED | OUTPATIENT
Start: 2022-05-20 | End: 2022-05-20

## 2022-05-20 RX ORDER — HEPARIN SODIUM 5000 [USP'U]/ML
7000 INJECTION INTRAVENOUS; SUBCUTANEOUS EVERY 6 HOURS
Refills: 0 | Status: DISCONTINUED | OUTPATIENT
Start: 2022-05-20 | End: 2022-05-24

## 2022-05-20 RX ORDER — WARFARIN SODIUM 2.5 MG/1
5 TABLET ORAL AT BEDTIME
Refills: 0 | Status: COMPLETED | OUTPATIENT
Start: 2022-05-20 | End: 2022-05-20

## 2022-05-20 RX ORDER — HEPARIN SODIUM 5000 [USP'U]/ML
INJECTION INTRAVENOUS; SUBCUTANEOUS
Qty: 25000 | Refills: 0 | Status: DISCONTINUED | OUTPATIENT
Start: 2022-05-20 | End: 2022-05-24

## 2022-05-20 RX ORDER — LANOLIN ALCOHOL/MO/W.PET/CERES
3 CREAM (GRAM) TOPICAL ONCE
Refills: 0 | Status: COMPLETED | OUTPATIENT
Start: 2022-05-20 | End: 2022-05-20

## 2022-05-20 RX ORDER — VENLAFAXINE HCL 75 MG
150 CAPSULE, EXT RELEASE 24 HR ORAL EVERY 24 HOURS
Refills: 0 | Status: DISCONTINUED | OUTPATIENT
Start: 2022-05-20 | End: 2022-05-26

## 2022-05-20 RX ORDER — FUROSEMIDE 40 MG
20 TABLET ORAL DAILY
Refills: 0 | Status: DISCONTINUED | OUTPATIENT
Start: 2022-05-20 | End: 2022-05-25

## 2022-05-20 RX ORDER — MIRTAZAPINE 45 MG/1
15 TABLET, ORALLY DISINTEGRATING ORAL AT BEDTIME
Refills: 0 | Status: DISCONTINUED | OUTPATIENT
Start: 2022-05-20 | End: 2022-05-26

## 2022-05-20 RX ORDER — HEPARIN SODIUM 5000 [USP'U]/ML
3500 INJECTION INTRAVENOUS; SUBCUTANEOUS EVERY 6 HOURS
Refills: 0 | Status: DISCONTINUED | OUTPATIENT
Start: 2022-05-20 | End: 2022-05-24

## 2022-05-20 RX ORDER — FUROSEMIDE 40 MG
20 TABLET ORAL DAILY
Refills: 0 | Status: DISCONTINUED | OUTPATIENT
Start: 2022-05-20 | End: 2022-05-20

## 2022-05-20 RX ORDER — ASPIRIN/CALCIUM CARB/MAGNESIUM 324 MG
81 TABLET ORAL DAILY
Refills: 0 | Status: DISCONTINUED | OUTPATIENT
Start: 2022-05-20 | End: 2022-05-26

## 2022-05-20 RX ORDER — LANOLIN ALCOHOL/MO/W.PET/CERES
3 CREAM (GRAM) TOPICAL AT BEDTIME
Refills: 0 | Status: DISCONTINUED | OUTPATIENT
Start: 2022-05-20 | End: 2022-05-20

## 2022-05-20 RX ORDER — CLOPIDOGREL BISULFATE 75 MG/1
75 TABLET, FILM COATED ORAL DAILY
Refills: 0 | Status: DISCONTINUED | OUTPATIENT
Start: 2022-05-20 | End: 2022-05-26

## 2022-05-20 RX ORDER — CLONAZEPAM 1 MG
0.5 TABLET ORAL EVERY 12 HOURS
Refills: 0 | Status: DISCONTINUED | OUTPATIENT
Start: 2022-05-20 | End: 2022-05-26

## 2022-05-20 RX ORDER — TAMSULOSIN HYDROCHLORIDE 0.4 MG/1
0.4 CAPSULE ORAL AT BEDTIME
Refills: 0 | Status: DISCONTINUED | OUTPATIENT
Start: 2022-05-20 | End: 2022-05-26

## 2022-05-20 RX ORDER — METOPROLOL TARTRATE 50 MG
25 TABLET ORAL
Refills: 0 | Status: DISCONTINUED | OUTPATIENT
Start: 2022-05-20 | End: 2022-05-24

## 2022-05-20 RX ADMIN — Medication 81 MILLIGRAM(S): at 11:50

## 2022-05-20 RX ADMIN — TAMSULOSIN HYDROCHLORIDE 0.4 MILLIGRAM(S): 0.4 CAPSULE ORAL at 21:30

## 2022-05-20 RX ADMIN — Medication 0.5 MILLIGRAM(S): at 17:14

## 2022-05-20 RX ADMIN — CLOPIDOGREL BISULFATE 75 MILLIGRAM(S): 75 TABLET, FILM COATED ORAL at 11:50

## 2022-05-20 RX ADMIN — Medication 650 MILLIGRAM(S): at 23:40

## 2022-05-20 RX ADMIN — MIRTAZAPINE 15 MILLIGRAM(S): 45 TABLET, ORALLY DISINTEGRATING ORAL at 21:29

## 2022-05-20 RX ADMIN — Medication 3 MILLIGRAM(S): at 03:32

## 2022-05-20 RX ADMIN — HEPARIN SODIUM 5000 UNIT(S): 5000 INJECTION INTRAVENOUS; SUBCUTANEOUS at 06:05

## 2022-05-20 RX ADMIN — HEPARIN SODIUM 1600 UNIT(S)/HR: 5000 INJECTION INTRAVENOUS; SUBCUTANEOUS at 13:46

## 2022-05-20 RX ADMIN — Medication 25 MILLIGRAM(S): at 17:15

## 2022-05-20 RX ADMIN — Medication 150 MILLIGRAM(S): at 23:40

## 2022-05-20 RX ADMIN — WARFARIN SODIUM 5 MILLIGRAM(S): 2.5 TABLET ORAL at 21:29

## 2022-05-20 RX ADMIN — HEPARIN SODIUM 1400 UNIT(S)/HR: 5000 INJECTION INTRAVENOUS; SUBCUTANEOUS at 21:48

## 2022-05-20 NOTE — H&P ADULT - ATTENDING COMMENTS
77M with PMH of recent IWSTEMI requiring CCU stay, discharged 2 days ago, CHF with EF 35%, hx of bipolar and anxiety, presents to the ED with hypotension and dyspnea. CXR showing new small b/l pleural effusions. BNP elevated to 8357. Will start lasix and repeat TTE given acute on chronic CHF, possible worsening cardiac function. For hypotension, will hold home antihypertensives. Monitor Cr, hold losartan. Rest of care per plan above.

## 2022-05-20 NOTE — PATIENT PROFILE ADULT - TRANSPORTATION
Problem: PHYSICAL THERAPY ADULT  Goal: Performs mobility at highest level of function for planned discharge setting  See evaluation for individualized goals  Description: Treatment/Interventions: Functional transfer training,LE strengthening/ROM,Therapeutic exercise,Endurance training,Cognitive reorientation,Patient/family training,Equipment eval/education,Bed mobility,Gait training  Equipment Recommended: Maryjane Taylor       See flowsheet documentation for full assessment, interventions and recommendations  Outcome: Progressing  Note: Prognosis: Good  Problem List: Decreased strength,Decreased endurance,Impaired balance,Decreased mobility,Decreased safety awareness  Assessment: pt began tx session lying supine in the bed and was agreeable to participate in PT intervention  pt continues to remain consistant with mod I as pt used bed rails to complete a supine<>sit EOB transfer and was able to sit EOB while performing TE activities w/o LOB in order to increase static/dynamic sitting balance  pt was able to perform 5STS in todays tx session in order to strengthen LE's and increase endurance and safety with all functional transfers  pt remains consistant with /s for all functional transfers as pt required VC's for hand placement for safety  pt remains consistant with ambulation distance and tolerance in todays tx session compared to previous tx session as pt ambulated 120'x1 ND and 90'x1 ND w/o LOB  post tx session pt in bathroom with 1:1 present post tx session with call bell in arms reach and instruction to pull wire when done  PT Discharge Recommendation: Home with home health rehabilitation          See flowsheet documentation for full assessment  no

## 2022-05-20 NOTE — PROGRESS NOTE ADULT - PROBLEM SELECTOR PLAN 3
Was present during previous hospitalization  Likely 2/2 cardiogenic shock and/or hepatic congestion  Held Statin previously   Downtrending     - continue to hold Statin   - CTM

## 2022-05-20 NOTE — H&P ADULT - ASSESSMENT
76 yo M w/ history of bipolar disorder, anxiety and BPH, and recent cardiogenic shock 2/2 IWSTEMI now presenting with some relative hypotension and OGLESBY, labs concerning for mild HF exacerbation

## 2022-05-20 NOTE — H&P ADULT - PROBLEM SELECTOR PLAN 1
TTE 5/12: EF 35 %, severe [facility] systolic [facility] for [facility] and 70% occlusion of left anterior descending artery.  The arteries were not intervened upon due to viability study showing no viability in the areas supplied by the occluded arteries.  Home meds: Aspirin 81mg,Clopidogrel 75mg, Metoprolol succinate 150mg daily and Losartan 25mg daily (with plans to eventually switch to Entresto). Atorvastatin was started but then held given rise in LFTs.   Now presenting with OGLESBY, nausea, and relative hypotension  CXR shows new small b/l pleural effusions  pro-BNP 8357 (no baseline)  EKG NSR   s/p 20 IV lasix with 500 cc urine output    - Strict I/0  - Lasix IV 20 mg daily with hold parameters  - Repeat TTE  - cards consult in AM   - continue ASA, Statin, low dose metoprolol   - hold losartan in setting of relative hypotension for patient

## 2022-05-20 NOTE — PROGRESS NOTE ADULT - PROBLEM SELECTOR PLAN 1
TTE 5/12: EF 35 %, severe LV systolic [facility] for right [facility] artery and 70% occlusion of left anterior descending artery.  The arteries were not intervened upon due to viability study showing no viability in the areas supplied by the occluded arteries.  Home meds: Aspirin 81mg,Clopidogrel 75mg, Metoprolol succinate 150mg daily and Losartan 25mg daily (with plans to eventually switch to Entresto). Atorvastatin was started but then held given rise in LFTs.   Now presenting with OGLESBY, nausea, and relative hypotension  CXR shows new small b/l pleural effusions  pro-BNP 8357 (no baseline)  EKG NSR   s/p 20 IV lasix with 500 cc urine output    - Strict I/0  - Lasix IV 20 mg daily with hold parameters  - Repeat TTE  - cards consult in AM   - continue ASA, Statin, low dose metoprolol   - hold losartan in setting of relative hypotension for patient TTE 5/12: EF 35 %, severe LV systolic dysfunction in setting of cardiogenic shock 2/2 IWSTEMI  [facility] showed 100% occlusion for right coronary artery and 70% occlusion of left anterior descending artery.  The arteries were not intervened upon due to viability study showing no viability in the areas supplied by the occluded arteries.  Home meds: Aspirin 81mg,Clopidogrel 75mg, Metoprolol succinate 150mg daily and Losartan 25mg daily (with plans to eventually switch to Entresto). Atorvastatin was started but then held given rise in LFTs.   Now presenting with OGLESBY, nausea, and relative hypotension  CXR shows new small b/l pleural effusions  pro-BNP 8357 (no baseline)  EKG NSR   s/p 20 IV lasix with 500 cc urine output    - Strict I/0  - Repeat TTE showing LV thrombus   - cards consulted, recs appreciated   - continue ASA, hold Statin, low dose metoprolol   - hold losartan in setting of relative hypotension/ questionable BRODY

## 2022-05-20 NOTE — H&P ADULT - PROBLEM SELECTOR PLAN 3
Was present during previous hospitalization  Likely 2/2 cardiogenic shock and/or hepatic congestion  Held Statin previously     - continue to hold Statin   - acute hepatitis panel  - RUQ US?   - CTM Was present during previous hospitalization  Likely 2/2 cardiogenic shock and/or hepatic congestion  Held Statin previously   Downtrending     - continue to hold Statin   - CTM

## 2022-05-20 NOTE — H&P ADULT - NSICDXPASTMEDICALHX_GEN_ALL_CORE_FT
PAST MEDICAL HISTORY:  CAD (coronary artery disease)     Constipation     Depression     Urinary retention

## 2022-05-20 NOTE — CONSULT NOTE ADULT - SUBJECTIVE AND OBJECTIVE BOX
Consult Note    HPI:  77 year old male with past medical history of HLD, bipolar disorder, anxiety, and BPH presenting with STEMI s/p C 5/11 remarkable for complete occlusion of RCA, 70% occlusion of LAD, CI 1.7, s/p IABP, undergoing viability scan with minimal viability in the infarcted territories; therefore no intervention planned and will continue management of HFrEF with GDMT titration. He was discharged some days ago on GDMT. Home meds: Aspirin 81mg,Clopidogrel 75mg, Metoprolol succinate 150mg daily and Losartan 25mg daily (with plans to eventually switch to Entresto). Atorvastatin was started but then held given rise in LFTs.     Now presenting with OGLESBY, nausea, and relative hypotension  CXR shows new small b/l pleural effusions  pro-BNP 8357 (no baseline)  EKG NSR   s/p 20 IV lasix with 500 cc urine output      TTE today done showing EF 30%, UZAIR 42, severe global LV systolic dysfunction, LV thrombus measuring 1.0 by 1.3 seen in LV apex, the mid anterior septum, the basal anterior septum, the mid anterior wall, and the mid inferoseptum are akinetic. Severe reversible diastolic dysfunction. PASP 35. Cardiology consulted for management of LV thrombus.    REVIEW OF SYSTEMS Negative unless otherwise mentioned    PMHx:   Depression  Constipation  Urinary retention  CAD (coronary artery disease)    PSHx:   History of hernia repair    Soc: no toxic habits    FAMILY HISTORY:  No pertinent family history in first degree relatives        Allergies:  No Known Allergies      Home Meds:    Current Medications:   acetaminophen     Tablet .. 650 milliGRAM(s) Oral every 6 hours PRN  aspirin enteric coated 81 milliGRAM(s) Oral daily  clonazePAM  Tablet 0.5 milliGRAM(s) Oral every 12 hours PRN  clopidogrel Tablet 75 milliGRAM(s) Oral daily  furosemide   Injectable 20 milliGRAM(s) IV Push daily  heparin   Injectable 5000 Unit(s) SubCutaneous every 8 hours  metoprolol tartrate 25 milliGRAM(s) Oral two times a day  mirtazapine 15 milliGRAM(s) Oral at bedtime  tamsulosin 0.4 milliGRAM(s) Oral at bedtime  venlafaxine XR. 150 milliGRAM(s) Oral every 24 hours      Physical Exam:  T(F): 97.4 (05-20), Max: 97.9 (05-19)  HR: 64 (05-20) (64 - 78)  BP: 98/62 (05-20) (84/61 - 106/70)  RR: 17 (05-20)  SpO2: 95% (05-20)    Well appearing  JVP  RRR  CTAB  Soft NTND  WWP, no pitting edema    Cardiovascular Diagnostic Testing:    Labs: reviewed    Assessment & Plan:    Recommendations not final unless countersigned by attending    Haim Jimenez PGY4  Cardiology Fellow Consult Note    HPI:  77 year old male with past medical history of HLD, bipolar disorder, anxiety, and BPH presenting with STEMI s/p C 5/11 remarkable for complete occlusion of RCA, 70% occlusion of LAD, CI 1.7, s/p IABP, undergoing viability scan with minimal viability in the infarcted territories; therefore no intervention planned and will continue management of HFrEF with GDMT titration. He was discharged some days ago on GDMT. Home meds: Aspirin 81mg, Clopidogrel 75mg, Metoprolol succinate 150mg daily and Losartan 25mg daily (with plans to eventually switch to Entresto). Atorvastatin was started but then held given rise in LFTs.     Now presenting with OGLESBY, nausea, and relative hypotension  CXR shows new small b/l pleural effusions  pro-BNP 8357 (no baseline)  EKG NSR   s/p 20 IV lasix with 500 cc urine output      TTE today done showing EF 30%, UZAIR 42, severe global LV systolic dysfunction, LV thrombus measuring 1.0 by 1.3 seen in LV apex, the mid anterior septum, the basal anterior septum, the mid anterior wall, and the mid inferoseptum are akinetic. Severe reversible diastolic dysfunction. PASP 35. Cardiology consulted for management of LV thrombus.    REVIEW OF SYSTEMS Negative unless otherwise mentioned    PMHx:   Depression  Constipation  Urinary retention  CAD (coronary artery disease)    PSHx:   History of hernia repair    Soc: no toxic habits    FAMILY HISTORY:  No pertinent family history in first degree relatives        Allergies:  No Known Allergies      Home Meds:    Current Medications:   acetaminophen     Tablet .. 650 milliGRAM(s) Oral every 6 hours PRN  aspirin enteric coated 81 milliGRAM(s) Oral daily  clonazePAM  Tablet 0.5 milliGRAM(s) Oral every 12 hours PRN  clopidogrel Tablet 75 milliGRAM(s) Oral daily  furosemide   Injectable 20 milliGRAM(s) IV Push daily  heparin   Injectable 5000 Unit(s) SubCutaneous every 8 hours  metoprolol tartrate 25 milliGRAM(s) Oral two times a day  mirtazapine 15 milliGRAM(s) Oral at bedtime  tamsulosin 0.4 milliGRAM(s) Oral at bedtime  venlafaxine XR. 150 milliGRAM(s) Oral every 24 hours      Physical Exam:  T(F): 97.4 (05-20), Max: 97.9 (05-19)  HR: 64 (05-20) (64 - 78)  BP: 98/62 (05-20) (84/61 - 106/70)  RR: 17 (05-20)  SpO2: 95% (05-20)    Well appearing, NAD  JVP normal  RRR  CTAB  Soft NTND  WWP, no pitting edema    Cardiovascular Diagnostic Testing:    Labs: reviewed    Assessment & Plan:    Recommendations not final unless countersigned by attending    Haim Jimenez PGY4  Cardiology Fellow Consult Note    HPI:  77 year old male with past medical history of HLD, bipolar disorder, anxiety, and BPH presenting with STEMI s/p C 5/11 remarkable for complete occlusion of RCA, 70% occlusion of LAD, CI 1.7, s/p IABP, undergoing viability scan with minimal viability in the infarcted territories; therefore no intervention planned and will continue management of HFrEF with GDMT titration. He was discharged some days ago on GDMT. Home meds: Aspirin 81mg, Clopidogrel 75mg, Metoprolol succinate 150mg daily and Losartan 25mg daily (with plans to eventually switch to Entresto). Atorvastatin was started but then held given rise in LFTs.     Now presenting with OGLESBY, nausea, and relative hypotension  CXR shows new small b/l pleural effusions  pro-BNP 8357 (no baseline)  EKG NSR   s/p 20 IV lasix with 500 cc urine output      TTE today done showing EF 30%, UZAIR 42, severe global LV systolic dysfunction, LV thrombus measuring 1.0 by 1.3 seen in LV apex, the mid anterior septum, the basal anterior septum, the mid anterior wall, and the mid inferoseptum are akinetic. Severe reversible diastolic dysfunction. PASP 35. Cardiology consulted for management of LV thrombus.    REVIEW OF SYSTEMS Negative unless otherwise mentioned    PMHx:   Depression  Constipation  Urinary retention  CAD (coronary artery disease)    PSHx:   History of hernia repair    Soc: no toxic habits    FAMILY HISTORY:  No pertinent family history in first degree relatives        Allergies:  No Known Allergies      Home Meds:    Current Medications:   acetaminophen     Tablet .. 650 milliGRAM(s) Oral every 6 hours PRN  aspirin enteric coated 81 milliGRAM(s) Oral daily  clonazePAM  Tablet 0.5 milliGRAM(s) Oral every 12 hours PRN  clopidogrel Tablet 75 milliGRAM(s) Oral daily  furosemide   Injectable 20 milliGRAM(s) IV Push daily  heparin   Injectable 5000 Unit(s) SubCutaneous every 8 hours  metoprolol tartrate 25 milliGRAM(s) Oral two times a day  mirtazapine 15 milliGRAM(s) Oral at bedtime  tamsulosin 0.4 milliGRAM(s) Oral at bedtime  venlafaxine XR. 150 milliGRAM(s) Oral every 24 hours      Physical Exam:  T(F): 97.4 (05-20), Max: 97.9 (05-19)  HR: 64 (05-20) (64 - 78)  BP: 98/62 (05-20) (84/61 - 106/70)  RR: 17 (05-20)  SpO2: 95% (05-20)    Well appearing, NAD  JVP normal  RRR  CTAB  Soft NTND  WWP, no pitting edema    Cardiovascular Diagnostic Testing:    Patient name: ELYSE MALAVE  YOB: 1945   Age: 77 (M)   MR#: [id number]  Study Date: 5/20/2022  Location: [address] [place], [place] [id number]: Gricelda [facility], [place]  Study quality: Technically fair  Referring Physician: Robby Bunch MD  Blood Pressure: 98/62 mmHg  Height: 183 cm  Weight: 88 kg  BSA: 2.1 m2  Heart Rate: 89 mmHg  ------------------------------------------------------------------------  PROCEDURE: Transthoracic echocardiogram with 2-D, M-Mode  and complete spectral and color flow Doppler.  INDICATION: Abnormal electrocardiogram  (R94.31 )  ------------------------------------------------------------------------  Dimensions:    Normal Values:  LA:     4.3    2.0 - 4.0 cm  Ao:     3.6    2.0 - 3.8 cm  SEPTUM: 1.3    0.6 - 1.2 cm  PWT:  0.9    0.6 - 1.1 cm  LVIDd:  5.7    3.0 - 5.6 cm  LVIDs:  5.0    1.8 - 4.0 cm  Derived variables:  LVMI: 122 g/m2  RWT: 0.31  Fractional short: 12 %  EF (Modified Pizarro Rule): 30 %  ------------------------------------------------------------------------  Observations:  Mitral Valve: Tethered mitral valve leaflets with normal  opening. Moderate mitral regurgitation.  Aortic Valve/Aorta: Calcified trileaflet aortic valve with  normal opening. Minimal aortic regurgitation.  Aortic Root: 3.6 cm.  Left Atrium: Moderately dilated left atrium.  LA volume  index = 42 cc/m2.  Left Ventricle: Endocardial visualization enhanced with  intravenous injection of Ultrasonic Enhancing Agent  (Lumason).  Severe global left ventricular systolic  dysfunction.  Left ventricular thrombus measuring 1.0 by  1.3 cm seen in LV apex. The mid anterior septum, the basal  anterior septum, the mid anterior wall, and the mid  inferoseptum are akinetic.  Mild left ventricular  enlargement. Severe reversible diastolic dysfunction (Stage  III).  Right Heart: Moderate right atrial enlargement. Right  ventricular enlargement with decreased right ventricular  systolic function. Normal tricuspid valve. Mild tricuspid  regurgitation. Normal pulmonic valve. Mild pulmonic  regurgitation.  Pericardium/Pleura: Small pericardial effusion. No evidence  of right atrial or right ventricular collapse. No  echocardiographic evidence of pericardial tamponade.  Bilateral pleural effusions.  Hemodynamic: Estimated right atrial pressure is 8 mm Hg.  Estimated right ventricular systolic pressure equals 35 mm  Hg, assuming right atrial pressure equals 8 mm Hg,  Impression: Acute Decompensated Heart Failure Class ## Stage ##. Currently there are no signs of developing shock.    --Please Diurese patient with ##. If diuresing multiple times a day, please obtain at least BID electrolytes  --Strict I/Os at all times. Generally, a gerardo catheter is discouraged due to risk for nosocomial infection.  --Continue Guideline Directed Medical Therapy  --Please monitor on telemetry  --Please obtain STANDING daily morning pre-prandial post-voiding weights  --K >4 Mg > 2  --If patient develops signs of shock, please call    Any Questions or Concerns please call    Thank you for this interesting consult    Haim Jimenez MD  Cardiology Fellow  [phone] with borderline pulmonary hypertension.  ------------------------------------------------------------------------  Conclusions:  1. Tethered mitral valve leaflets with normal opening.  Moderate mitral regurgitation.  2. Minimal aortic regurgitation.  3. Endocardial visualization enhanced with intravenous  injection of Ultrasonic Enhancing Agent (Lumason). Severe  global left ventricular systolic dysfunction.  Left  ventricular thrombus measuring 1.0 by 1.3 cm seen in LV  apex.  4. Severe reversible diastolic dysfunction (Stage III).  5. Right ventricular enlargement with decreased right  ventricular systolic function.  6. Small pericardial effusion. No evidence of right atrial  or right ventricular collapse. No echocardiographic  evidence of pericardial tamponade.  7. Bilateral pleural effusions.  *** Compared with echocardiogram of 5/12/2022, the trombus  is new.  Results communicated to Bradley Herbert MD.  ------------------------------------------------------------------------  Confirmed on  5/20/2022 - 11:41:03 by Dario Underwood M.D.  ------------------------------------------------------------------------    Labs: reviewed    Assessment & Plan:  77 year old male with past medical history of HLD, bipolar disorder, anxiety, and BPH presenting with STEMI s/p [facility] 5/11 remarkable for complete occlusion of RCA, 70% occlusion of LAD, CI 1.7, s/p IABP, viability scan with minimal viability in the infarcted territories; he is representing with SOB. TTE today done showing EF 30%, UZAIR 42, severe global LV systolic dysfunction, LV thrombus measuring 1.0 by 1.3 seen in LV apex, the mid anterior septum, the basal anterior septum, the mid anterior wall, and the mid inferoseptum are akinetic. Severe reversible diastolic dysfunction. PASP 35. Cardiology consulted for management of LV thrombus. Troponin 70 on Cr 1.3, low concern for ACS, no chest pain.    --Heparin gtt, begin warfarin dosing once stably therapeutic, goal 2-2.5  --Consider diuresis, would discharge on 20 oral lasix to treat SOB/maintain euvolemia  --c/w DAPT. Would uptitrate beta blocker as tolerated.  Start Lipitor. Please check lactate given low BP. Eventual reintroduction of losartan if tolerating.  --Strict I/Os at all times. Generally, a gerardo catheter is discouraged due to risk for nosocomial infection.  --Please monitor on telemetry  --Please obtain STANDING daily morning pre-prandial post-voiding weights  --K >4 Mg > 2    Recommendations not final unless countersigned by attending    Haim Jimenez PGY4  Cardiology Fellow 77 year old male with past medical history of HLD, bipolar disorder, anxiety, and BPH.    Presented recently with STEMI, s/p C 5/11 remarkable for complete occlusion of RCA, 70% occlusion of LAD, CI 1.7, s/p IABP.  Viability scan showed minimal viability in the infarcted territories, therefore no intervention performed.  Patient treated for management of HFrEF with GDMT titration and was discharged home on meds including aspirin 81mg, clopidogrel 75mg, metoprolol succinate 150mg daily and losartan 25mg daily (with plans to eventually switch to Entresto). Atorvastatin was started but then held given rise in LFTs.     Now presenting with OGLESBY, nausea, and relative hypotension.  CXR shows new small b/l pleural effusions.  Pro-BNP 8357 (no baseline).  EKG NSR.  Now s/p 20 IV Lasix with 500 cc urine output    TTE today shows EF 30%, UZAIR 42, & severe global LV systolic dysfunction.  Also, LV thrombus seen, measuring 1.0 by 1.3 seen in LV apex.  The mid anterior septum, the basal anterior septum, the mid anterior wall, and the mid inferoseptum are akinetic. Severe reversible diastolic dysfunction noted. PASP 35.   Cardiology consulted for management of LV thrombus and CHF.      PMHx:   Depression  Constipation  Urinary retention  CAD (coronary artery disease)    PSHx:   History of hernia repair    Soc: no toxic habits    FAMILY HISTORY:  No pertinent cardiac history in first degree relatives    Allergies:  No Known Allergies    Current Medications:   acetaminophen     Tablet .. 650 milliGRAM(s) Oral every 6 hours PRN  aspirin enteric coated 81 milliGRAM(s) Oral daily  clonazePAM  Tablet 0.5 milliGRAM(s) Oral every 12 hours PRN  clopidogrel Tablet 75 milliGRAM(s) Oral daily  furosemide   Injectable 20 milliGRAM(s) IV Push daily  heparin   Injectable 5000 Unit(s) SubCutaneous every 8 hours  metoprolol tartrate 25 milliGRAM(s) Oral two times a day  mirtazapine 15 milliGRAM(s) Oral at bedtime  tamsulosin 0.4 milliGRAM(s) Oral at bedtime  venlafaxine XR. 150 milliGRAM(s) Oral every 24 hours    ROS:  General: no fatigue/malaise, weight loss/gain.  Skin: no rashes.  Eyes: no blurred vision, no loss of vision. 	  ENT: no sore throat, rhinorrhea, sinus congestion.  Gastrointestinal:  no V/D, no melena/hematemesis/hematochezia.  Genitourinary: no dysuria/hesitancy or hematuria.  Musculoskeletal: no myalgias or arthralgias.  Neurological: no changes in vision or hearing, no lightheadedness/dizziness, no syncope/near syncope	  Psychiatric: no unusual stress/anxiety.   Hematology/Lymphatics: no unusual bleeding, bruising and no lymphadenopathy.  All others negative except as stated above and in HPI.         Physical Exam:  T(F): 97.4 (05-20), Max: 97.9 (05-19)  HR: 64 (05-20) (64 - 78)  BP: 98/62 (05-20) (84/61 - 106/70)  RR: 17 (05-20)  SpO2: 95% (05-20)    Well appearing, NAD  JVP normal  RRR  CTAB  Soft NTND  WWP, no pitting edema  Neuro:  non focal  No rash      12 Lead ECG (05.19.22 @ 17:01)   Sinus arrhythmia at 71 BPM   P-R Interval 200 ms, QRS Duration 96 ms, Q-T Interval 426 ms   Axis -84 degrees   LAD, ANTEROSEPTAL INFARCT      ECHO:  Patient name: ELYSE MALAVE  YOB: 1945   Age: 77 (M)   MR#: [id number]  Study Date: 5/20/2022  Location: [address] [place], [place] [id number]: Gricelda [facility] [place]  Study quality: Technically fair  Referring Physician: Robby Bunch MD  Blood Pressure: 98/62 mmHg  Height: 183 cm  Weight: 88 kg  BSA: 2.1 m2  Heart Rate: 89 mmHg  ------------------------------------------------------------------------  PROCEDURE: Transthoracic echocardiogram with 2-D, M-Mode  and complete spectral and color flow Doppler.  INDICATION: Abnormal electrocardiogram  (R94.31 )  ------------------------------------------------------------------------  Dimensions:    Normal Values:  LA:     4.3    2.0 - 4.0 cm  Ao:     3.6    2.0 - 3.8 cm  SEPTUM: 1.3    0.6 - 1.2 cm  PWT:  0.9    0.6 - 1.1 cm  LVIDd:  5.7    3.0 - 5.6 cm  LVIDs:  5.0    1.8 - 4.0 cm  Derived variables:  LVMI: 122 g/m2  RWT: 0.31  Fractional short: 12 %  EF (Modified Pizarro Rule): 30 %  ------------------------------------------------------------------------  Observations:  Mitral Valve: Tethered mitral valve leaflets with normal  opening. Moderate mitral regurgitation.  Aortic Valve/Aorta: Calcified trileaflet aortic valve with  normal opening. Minimal aortic regurgitation.  Aortic Root: 3.6 cm.  Left Atrium: Moderately dilated left atrium.  LA volume  index = 42 cc/m2.  Left Ventricle: Endocardial visualization enhanced with  intravenous injection of Ultrasonic Enhancing Agent  (Lumason).  Severe global left ventricular systolic  dysfunction.  Left ventricular thrombus measuring 1.0 by  1.3 cm seen in LV apex. The mid anterior septum, the basal  anterior septum, the mid anterior wall, and the mid  inferoseptum are akinetic.  Mild left ventricular  enlargement. Severe reversible diastolic dysfunction (Stage  III).  Right Heart: Moderate right atrial enlargement. Right  ventricular enlargement with decreased right ventricular  systolic function. Normal tricuspid valve. Mild tricuspid  regurgitation. Normal pulmonic valve. Mild pulmonic  regurgitation.  Pericardium/Pleura: Small pericardial effusion. No evidence  of right atrial or right ventricular collapse. No  echocardiographic evidence of pericardial tamponade.  Bilateral pleural effusions.  Hemodynamic: Estimated right atrial pressure is 8 mm Hg.  Estimated right ventricular systolic pressure equals 35 mm  Hg, assuming right atrial pressure equals 8 mm Hg, consistent with borderline pulmonary hypertension.  ------------------------------------------------------------------------  Conclusions:  1. Tethered mitral valve leaflets with normal opening.  Moderate mitral regurgitation.  2. Minimal aortic regurgitation.  3. Endocardial visualization enhanced with intravenous  injection of Ultrasonic Enhancing Agent (Lumason). Severe  global left ventricular systolic dysfunction.  Left  ventricular thrombus measuring 1.0 by 1.3 cm seen in LV  apex.  4. Severe reversible diastolic dysfunction (Stage III).  5. Right ventricular enlargement with decreased right  ventricular systolic function.  6. Small pericardial effusion. No evidence of right atrial  or right ventricular collapse. No echocardiographic  evidence of pericardial tamponade.  7. Bilateral pleural effusions.  *** Compared with echocardiogram of 5/12/2022, the trombus  is new.  Results communicated to Bradley Herbert MD.  ------------------------------------------------------------------------  Confirmed on  5/20/2022 - 11:41:03 by Dario Underwood M.D.  ------------------------------------------------------------------------    Labs:                       12.5   8.53  )-----------( 245      ( 20 May 2022 06:55 )             39.6     05-20    134<L>  |  103  |  37<H>  ----------------------------<  114<H>  4.4   |  18<L>  |  1.33<H>    Ca    8.7      20 May 2022 06:55  Phos  3.7     05-20  Mg     2.2     05-20    TPro  6.1  /  Alb  3.3  /  TBili  0.7  /  DBili  x   /  AST  43<H>  /  ALT  71<H>  /  AlkPhos  240<H>  05-20      BNP   8357

## 2022-05-20 NOTE — H&P ADULT - PROBLEM SELECTOR PLAN 4
SCr 1.35 while baseline 1.0-1.1  Likely in setting of new Losartan use and/or cardiorenal    - hold Losartan for now  - 20mg IV lasix daily   - Avoid nephrotoxic agents   - CTM

## 2022-05-20 NOTE — H&P ADULT - NSHPREVIEWOFSYSTEMS_GEN_ALL_CORE
CONSTITUTIONAL:  No weight loss, fever, chills, weakness or fatigue.  HEENT:  Eyes:  No visual loss, blurred vision, double vision or yellow sclerae. Ears, Nose, Throat:  No hearing loss, sneezing, congestion, runny nose or sore throat.  SKIN:  No rash or itching.  CARDIOVASCULAR:  No chest pain, chest pressure or chest discomfort. No palpitations.  RESPIRATORY:  (+) OGLESBY No shortness of breath, cough or sputum.  GASTROINTESTINAL:  (+) nausea No anorexia, vomiting or diarrhea. No abdominal pain or blood.  GENITOURINARY:  Denies hematuria, dysuria.   NEUROLOGICAL:  No headache, dizziness, syncope, paralysis, ataxia, numbness or tingling in the extremities. No change in bowel or bladder control.  MUSCULOSKELETAL:  No muscle, back pain, joint pain or stiffness.  HEMATOLOGIC:  No anemia, bleeding or bruising.  LYMPHATICS:  No enlarged nodes.   PSYCHIATRIC:  (+) depression and anxiety   ENDOCRINOLOGIC:  No reports of sweating, cold or heat intolerance. No polyuria or polydipsia.  ALLERGIES:  No history of asthma, hives, eczema or rhinitis.

## 2022-05-20 NOTE — H&P ADULT - NSHPPHYSICALEXAM_GEN_ALL_CORE
ICU Vital Signs Last 24 Hrs  T(C): 36.3 (20 May 2022 00:29), Max: 36.6 (19 May 2022 18:00)  T(F): 97.4 (20 May 2022 00:29), Max: 97.9 (19 May 2022 18:00)  HR: 73 (20 May 2022 00:29) (66 - 75)  BP: 95/62 (20 May 2022 00:29) (84/61 - 106/70)  BP(mean): 71 (19 May 2022 21:00) (71 - 78)  RR: 17 (20 May 2022 00:29) (16 - 20)  SpO2: 96% (20 May 2022 00:29) (96% - 99%)    PHYSICAL EXAM:  GENERAL: NAD, lying in bed comfortably  HEAD:  Atraumatic, Normocephalic  EYES: conjunctiva and sclera clear  CHEST/LUNG: on RA, mild crackles at bases bilaterally   HEART: Regular rate and rhythm; No murmurs, rubs, or gallops  ABDOMEN: Bowel sounds present; Soft, Nontender, Nondistended.   EXTREMITIES:  trace- 1+ pitting edema at LE b/l   NERVOUS SYSTEM:  Alert & Oriented X3, speech clear. No deficits   MSK: FROM all 4 extremities, full and equal strength  SKIN: No rashes or lesions

## 2022-05-20 NOTE — H&P ADULT - HISTORY OF PRESENT ILLNESS
78 yo M w/ history of bipolar disorder, anxiety and BPH, and recent cardiogenic shock 2/2 IWSTEMI now presenting with some nausea, and concern for hypotension. Patient states he was feeling nausea, no vomiting. Felt dyspneic on exertion but this was not new since discharge. States he came in due to concern for low BP ever since he was discharged, stating his SBP was in 90s, and diastolic was 50s.    Patient was recently hospitalized on May 11-17,  LHC showed 100% occlusion for right coronary artery and 70% occlusion of left anterior descending artery.  The arteries were not intervened upon due to viability study showing no viability in the areas supplied by the occluded arteries. The patient was in cardiac ICU with an intra-aortic balloon pump in place for a couple days in setting of cardiogenic shock. Ejection fraction on TTE was 35%. His condition improved and he was down graded to general medicine floors to uptitrate goal directed medical therapies. He was discharged 2 days prior on Aspirin 81mg,Clopidogrel 75mg, Metoprolol succinate 150mg daily and Losartan 25mg daily (with plans to eventually switch to Entresto). Atorvastatin was started but then held given rise in LFTs.   BP during hospitalization was 120s-110s.   Patient states he was taking medications as prescribed

## 2022-05-20 NOTE — PATIENT PROFILE ADULT - FALL HARM RISK - HARM RISK INTERVENTIONS

## 2022-05-20 NOTE — PROGRESS NOTE ADULT - ATTENDING COMMENTS
78 yo M w/ history of bipolar disorder, recent admission for NSTEMI, cath showed complete occlusion of RCA and 70% occlusion of the LAD with a cardiac index of 1.7 without viability so no intervention done, EF 35% readmitted w dyspnea.     Found to have an LV thrombus. On heparin gtt bridge to Coumadin. Will d/w Cardiology if Lovenox appropriate.     IV Lasix for mild CHF.

## 2022-05-20 NOTE — PROGRESS NOTE ADULT - ASSESSMENT
78 yo M w/ history of bipolar disorder, anxiety and BPH, and recent cardiogenic shock 2/2 IWSTEMI now presenting with some relative hypotension and OGLESBY, labs concerning for mild HF exacerbation  78 yo M w/ history of bipolar disorder, anxiety and BPH, and recent cardiogenic shock 2/2 IWSTEMI now presenting with some relative hypotension and OGLESBY here for question of mild CHF exacerbation at baseline ready for discharge today.

## 2022-05-20 NOTE — PROGRESS NOTE ADULT - PROBLEM SELECTOR PLAN 2
SBP 90s now   Per hospital records were 110s-120s prior to DC  Could be 2/2 Losartan    - hold Losartan   - low dose Metoprolol and increase as tolerated SBP 90s now   Per hospital records were 110s-120s prior to DC  Could be 2/2 Losartan, continue to hold     - hold Losartan   - low dose Metoprolol and increase as tolerated

## 2022-05-20 NOTE — PROGRESS NOTE ADULT - PROBLEM SELECTOR PLAN 6
DVT ppx: HSQ  Diet: DASH   PT consult   Dispo: pending HF work up DVT ppx: Will AC given new LV Thrombus   Diet: DASH   PT consult   Dispo: pending HF work up DVT ppx: Will AC given new LV Thrombus   Diet: DASH   PT consult   Dispo: pending HF work up  Communication: Spoke with patient and wife at bedside around 12:45PM 5/20/22

## 2022-05-20 NOTE — H&P ADULT - PROBLEM SELECTOR PLAN 2
SBP 90s now   Per hospital records were 110s-120s prior to DC  Could be 2/2 Losartan    - hold Losartan   - low dose Metoprolol and increase as tolerated

## 2022-05-20 NOTE — H&P ADULT - NSHPLABSRESULTS_GEN_ALL_CORE
LABS:                          13.0   9.33  )-----------( 304      ( 19 May 2022 18:17 )             40.8     05-19    134<L>  |  102  |  36<H>  ----------------------------<  134<H>  5.1   |  18<L>  |  1.35<H>    Ca    9.1      19 May 2022 18:17  Mg     2.3     05-19    TPro  6.8  /  Alb  3.5  /  TBili  0.9  /  DBili  x   /  AST  60<H>  /  ALT  79<H>  /  AlkPhos  284<H>  05-19    LIVER FUNCTIONS - ( 19 May 2022 18:17 )  Alb: 3.5 g/dL / Pro: 6.8 g/dL / ALK PHOS: 284 U/L / ALT: 79 U/L / AST: 60 U/L / GGT: x             CXR:     ACC: [id number] EXAM:  XR CHEST PORTABLE URGENT 1V                          PROCEDURE DATE:  05/19/2022    ******PRELIMINARY REPORT******      ******PRELIMINARY REPORT******           INTERPRETATION:  CLINICAL INFORMATION: Chest pain.    TECHNIQUE: AP radiograph of the chest.    COMPARISON: Chest radiograph dated 5/14/2022.    FINDINGS:    Small bilateral pleural effusions, right greater than left with layering   of the right effusion and associated bibasilar atelectasis. No   pneumothorax.  Cardiomediastinal silhouette is poorly evaluated on this projection.  No acute bony pathology.    IMPRESSION:    Small bilateral pleural effusions, new from 5/14/2022. LABS:                          13.0   9.33  )-----------( 304      ( 19 May 2022 18:17 )             40.8     05-19    134<L>  |  102  |  36<H>  ----------------------------<  134<H>  5.1   |  18<L>  |  1.35<H>    Ca    9.1      19 May 2022 18:17  Mg     2.3     05-19    TPro  6.8  /  Alb  3.5  /  TBili  0.9  /  DBili  x   /  AST  60<H>  /  ALT  79<H>  /  AlkPhos  284<H>  05-19    LIVER FUNCTIONS - ( 19 May 2022 18:17 )  Alb: 3.5 g/dL / Pro: 6.8 g/dL / ALK PHOS: 284 U/L / ALT: 79 U/L / AST: 60 U/L / GGT: x             CXR:     ACC: [id number] EXAM:  XR CHEST PORTABLE URGENT 1V                          PROCEDURE DATE:  05/19/2022    ******PRELIMINARY REPORT******      ******PRELIMINARY REPORT******           INTERPRETATION:  CLINICAL INFORMATION: Chest pain.    TECHNIQUE: AP radiograph of the chest.    COMPARISON: Chest radiograph dated 5/14/2022.    FINDINGS:    Small bilateral pleural effusions, right greater than left with layering   of the right effusion and associated bibasilar atelectasis. No   pneumothorax.  Cardiomediastinal silhouette is poorly evaluated on this projection.  No acute bony pathology.    IMPRESSION:    Small bilateral pleural effusions, new from 5/14/2022.    EKG, imaging, labs reviewed.

## 2022-05-21 LAB
ALBUMIN SERPL ELPH-MCNC: 3.3 G/DL — SIGNIFICANT CHANGE UP (ref 3.3–5)
ALP SERPL-CCNC: 246 U/L — HIGH (ref 40–120)
ALT FLD-CCNC: 66 U/L — HIGH (ref 10–45)
ANION GAP SERPL CALC-SCNC: 11 MMOL/L — SIGNIFICANT CHANGE UP (ref 5–17)
APTT BLD: 139.2 SEC — CRITICAL HIGH (ref 27.5–35.5)
APTT BLD: 73.2 SEC — HIGH (ref 27.5–35.5)
APTT BLD: 85.2 SEC — HIGH (ref 27.5–35.5)
AST SERPL-CCNC: 44 U/L — HIGH (ref 10–40)
BASOPHILS # BLD AUTO: 0.08 K/UL — SIGNIFICANT CHANGE UP (ref 0–0.2)
BASOPHILS NFR BLD AUTO: 0.9 % — SIGNIFICANT CHANGE UP (ref 0–2)
BILIRUB SERPL-MCNC: 1 MG/DL — SIGNIFICANT CHANGE UP (ref 0.2–1.2)
BUN SERPL-MCNC: 28 MG/DL — HIGH (ref 7–23)
CALCIUM SERPL-MCNC: 9 MG/DL — SIGNIFICANT CHANGE UP (ref 8.4–10.5)
CHLORIDE SERPL-SCNC: 105 MMOL/L — SIGNIFICANT CHANGE UP (ref 96–108)
CO2 SERPL-SCNC: 22 MMOL/L — SIGNIFICANT CHANGE UP (ref 22–31)
CREAT SERPL-MCNC: 1.15 MG/DL — SIGNIFICANT CHANGE UP (ref 0.5–1.3)
EGFR: 66 ML/MIN/1.73M2 — SIGNIFICANT CHANGE UP
EOSINOPHIL # BLD AUTO: 0.34 K/UL — SIGNIFICANT CHANGE UP (ref 0–0.5)
EOSINOPHIL NFR BLD AUTO: 3.6 % — SIGNIFICANT CHANGE UP (ref 0–6)
GLUCOSE SERPL-MCNC: 109 MG/DL — HIGH (ref 70–99)
HCT VFR BLD CALC: 41.7 % — SIGNIFICANT CHANGE UP (ref 39–50)
HGB BLD-MCNC: 12.9 G/DL — LOW (ref 13–17)
IMM GRANULOCYTES NFR BLD AUTO: 0.9 % — SIGNIFICANT CHANGE UP (ref 0–1.5)
INR BLD: 1.27 RATIO — HIGH (ref 0.88–1.16)
LACTATE SERPL-SCNC: 2.2 MMOL/L — HIGH (ref 0.7–2)
LYMPHOCYTES # BLD AUTO: 2.81 K/UL — SIGNIFICANT CHANGE UP (ref 1–3.3)
LYMPHOCYTES # BLD AUTO: 29.9 % — SIGNIFICANT CHANGE UP (ref 13–44)
MAGNESIUM SERPL-MCNC: 2.2 MG/DL — SIGNIFICANT CHANGE UP (ref 1.6–2.6)
MCHC RBC-ENTMCNC: 30.9 GM/DL — LOW (ref 32–36)
MCHC RBC-ENTMCNC: 30.9 PG — SIGNIFICANT CHANGE UP (ref 27–34)
MCV RBC AUTO: 100 FL — SIGNIFICANT CHANGE UP (ref 80–100)
MONOCYTES # BLD AUTO: 1.35 K/UL — HIGH (ref 0–0.9)
MONOCYTES NFR BLD AUTO: 14.3 % — HIGH (ref 2–14)
NEUTROPHILS # BLD AUTO: 4.75 K/UL — SIGNIFICANT CHANGE UP (ref 1.8–7.4)
NEUTROPHILS NFR BLD AUTO: 50.4 % — SIGNIFICANT CHANGE UP (ref 43–77)
NRBC # BLD: 0 /100 WBCS — SIGNIFICANT CHANGE UP (ref 0–0)
PHOSPHATE SERPL-MCNC: 3.3 MG/DL — SIGNIFICANT CHANGE UP (ref 2.5–4.5)
PLATELET # BLD AUTO: 255 K/UL — SIGNIFICANT CHANGE UP (ref 150–400)
POTASSIUM SERPL-MCNC: 4.3 MMOL/L — SIGNIFICANT CHANGE UP (ref 3.5–5.3)
POTASSIUM SERPL-SCNC: 4.3 MMOL/L — SIGNIFICANT CHANGE UP (ref 3.5–5.3)
PROT SERPL-MCNC: 6.6 G/DL — SIGNIFICANT CHANGE UP (ref 6–8.3)
PROTHROM AB SERPL-ACNC: 14.6 SEC — HIGH (ref 10.5–13.4)
RBC # BLD: 4.17 M/UL — LOW (ref 4.2–5.8)
RBC # FLD: 15.3 % — HIGH (ref 10.3–14.5)
SODIUM SERPL-SCNC: 138 MMOL/L — SIGNIFICANT CHANGE UP (ref 135–145)
WBC # BLD: 9.41 K/UL — SIGNIFICANT CHANGE UP (ref 3.8–10.5)
WBC # FLD AUTO: 9.41 K/UL — SIGNIFICANT CHANGE UP (ref 3.8–10.5)

## 2022-05-21 PROCEDURE — 99233 SBSQ HOSP IP/OBS HIGH 50: CPT | Mod: GC

## 2022-05-21 RX ORDER — WARFARIN SODIUM 2.5 MG/1
5 TABLET ORAL AT BEDTIME
Refills: 0 | Status: DISCONTINUED | OUTPATIENT
Start: 2022-05-21 | End: 2022-05-21

## 2022-05-21 RX ORDER — WARFARIN SODIUM 2.5 MG/1
5 TABLET ORAL AT BEDTIME
Refills: 0 | Status: COMPLETED | OUTPATIENT
Start: 2022-05-21 | End: 2022-05-21

## 2022-05-21 RX ADMIN — TAMSULOSIN HYDROCHLORIDE 0.4 MILLIGRAM(S): 0.4 CAPSULE ORAL at 23:02

## 2022-05-21 RX ADMIN — HEPARIN SODIUM 1100 UNIT(S)/HR: 5000 INJECTION INTRAVENOUS; SUBCUTANEOUS at 08:56

## 2022-05-21 RX ADMIN — Medication 20 MILLIGRAM(S): at 05:55

## 2022-05-21 RX ADMIN — Medication 25 MILLIGRAM(S): at 05:54

## 2022-05-21 RX ADMIN — Medication 650 MILLIGRAM(S): at 04:25

## 2022-05-21 RX ADMIN — MIRTAZAPINE 15 MILLIGRAM(S): 45 TABLET, ORALLY DISINTEGRATING ORAL at 23:04

## 2022-05-21 RX ADMIN — HEPARIN SODIUM 1100 UNIT(S)/HR: 5000 INJECTION INTRAVENOUS; SUBCUTANEOUS at 14:05

## 2022-05-21 RX ADMIN — HEPARIN SODIUM 1100 UNIT(S)/HR: 5000 INJECTION INTRAVENOUS; SUBCUTANEOUS at 05:55

## 2022-05-21 RX ADMIN — HEPARIN SODIUM 0 UNIT(S)/HR: 5000 INJECTION INTRAVENOUS; SUBCUTANEOUS at 04:21

## 2022-05-21 RX ADMIN — Medication 81 MILLIGRAM(S): at 11:40

## 2022-05-21 RX ADMIN — CLOPIDOGREL BISULFATE 75 MILLIGRAM(S): 75 TABLET, FILM COATED ORAL at 11:40

## 2022-05-21 RX ADMIN — Medication 150 MILLIGRAM(S): at 23:04

## 2022-05-21 RX ADMIN — WARFARIN SODIUM 5 MILLIGRAM(S): 2.5 TABLET ORAL at 23:03

## 2022-05-21 RX ADMIN — Medication 25 MILLIGRAM(S): at 17:24

## 2022-05-21 RX ADMIN — HEPARIN SODIUM 1100 UNIT(S)/HR: 5000 INJECTION INTRAVENOUS; SUBCUTANEOUS at 20:43

## 2022-05-21 RX ADMIN — HEPARIN SODIUM 1100 UNIT(S)/HR: 5000 INJECTION INTRAVENOUS; SUBCUTANEOUS at 07:43

## 2022-05-21 NOTE — PROGRESS NOTE ADULT - PROBLEM SELECTOR PLAN 6
DVT ppx: Will AC given new LV Thrombus   Diet: DASH   PT consult   Dispo: pending HF work up  Communication: Spoke with patient and wife at bedside around 12:45PM 5/20/22 DVT ppx: On heparin likely change to lovenox and coumadin bridge  Diet: DASH   Dispo: Home  Communication: Spoke with patient and wife at bedside around 12:45PM 5/20/22 DVT ppx: On heparin likely change to lovenox and coumadin bridge  Diet: DASH   Dispo: Home  Communication: Spoke with patient and wife at bedside around 12:45PM 5/20/22, wife Helder at 5/21 10:21AM

## 2022-05-21 NOTE — PROGRESS NOTE ADULT - PROBLEM SELECTOR PLAN 4
SCr 1.35 while baseline 1.0-1.1  Likely in setting of new Losartan use and/or cardiorenal    - hold Losartan for now  - 20mg IV lasix daily   - Avoid nephrotoxic agents   - CTM SCr 1.35 while baseline 1.0-1.1 likely 2/2 new Losartan use  - hold Losartan for now, c/w lasix 20 po qd   - Avoid nephrotoxic agents, ctm

## 2022-05-21 NOTE — PROGRESS NOTE ADULT - SUBJECTIVE AND OBJECTIVE BOX
%%%%INCOMPLETE NOTE%%%%%     Dr. Bradley Herbert PGY2    ELYSE MALAVE  77y  MRN: [id number]    Subjective:    Patient is a 77y old  Male who presents with a chief complaint of HF exacerbation (20 May 2022 12:30)      MEDICATIONS  (STANDING):  aspirin enteric coated 81 milliGRAM(s) Oral daily  clopidogrel Tablet 75 milliGRAM(s) Oral daily  furosemide    Tablet 20 milliGRAM(s) Oral daily  heparin  Infusion.  Unit(s)/Hr (16 mL/Hr) IV Continuous <Continuous>  metoprolol tartrate 25 milliGRAM(s) Oral two times a day  mirtazapine 15 milliGRAM(s) Oral at bedtime  tamsulosin 0.4 milliGRAM(s) Oral at bedtime  venlafaxine XR. 150 milliGRAM(s) Oral every 24 hours    MEDICATIONS  (PRN):  acetaminophen     Tablet .. 650 milliGRAM(s) Oral every 6 hours PRN Temp greater or equal to 38C (100.4F), Mild Pain (1 - 3)  clonazePAM  Tablet 0.5 milliGRAM(s) Oral every 12 hours PRN anxiety  heparin   Injectable 7000 Unit(s) IV Push every 6 hours PRN For aPTT less than 40  heparin   Injectable 3500 Unit(s) IV Push every 6 hours PRN For aPTT between 40 - 57        Objective:    Vitals: Vital Signs Last 24 Hrs  T(C): 36.3 (05-21-22 @ 04:37), Max: 36.3 (05-20-22 @ 20:49)  T(F): 97.4 (05-21-22 @ 04:37), Max: 97.4 (05-21-22 @ 04:37)  HR: 61 (05-21-22 @ 04:37) (61 - 69)  BP: 109/74 (05-21-22 @ 04:37) (100/74 - 109/74)  BP(mean): --  RR: 18 (05-21-22 @ 04:37) (17 - 18)  SpO2: 94% (05-21-22 @ 04:37) (94% - 94%)            I&O's Summary    20 May 2022 07:01  -  21 May 2022 06:14  --------------------------------------------------------  IN: 1300 mL / OUT: 1600 mL / NET: -300 mL        PHYSICAL EXAM:  GENERAL: NAD, well-groomed, well-developed  HEAD:  Atraumatic, Normocephalic  EYES: EOMI, PERRLA, conjunctiva and sclera clear  ENMT: No tonsillar erythema, exudates, or enlargement; Moist mucous membranes, Good dentition, No lesions  NECK: Supple, No JVD, Normal thyroid  CHEST/LUNG: Clear to auscultation bilaterally; No rales, rhonchi, wheezing, or rubs  HEART: Regular rate and rhythm; No murmurs, rubs, or gallops  ABDOMEN: Soft, Nontender, Nondistended; Bowel sounds present  EXTREMITIES:  2+ Peripheral Pulses, No clubbing, cyanosis, or edema  LYMPH: No lymphadenopathy noted  SKIN: No rashes or lesions  NERVOUS SYSTEM:  Alert & Oriented X4, Good concentration  PSYCH: Normal Affect. Speaking in Full Sentences. Laying in bed comfortably; not agitated     LABS:                        12.6   8.71  )-----------( 268      ( 20 May 2022 21:04 )             40.2                         12.5   8.53  )-----------( 245      ( 20 May 2022 06:55 )             39.6                         13.0   9.33  )-----------( 304      ( 19 May 2022 18:17 )             40.8     Hgb Trend: 12.6<--, 12.5<--, 13.0<--, 11.9<--, 12.2<--  05-20    134<L>  |  103  |  37<H>  ----------------------------<  114<H>  4.4   |  18<L>  |  1.33<H>  05-19    134<L>  |  102  |  36<H>  ----------------------------<  134<H>  5.1   |  18<L>  |  1.35<H>    Ca    8.7      20 May 2022 06:55  Ca    9.1      19 May 2022 18:17  Phos  3.7     05-20  Mg     2.2     05-20    TPro  6.1  /  Alb  3.3  /  TBili  0.7  /  DBili  x   /  AST  43<H>  /  ALT  71<H>  /  AlkPhos  240<H>  05-20  TPro  6.8  /  Alb  3.5  /  TBili  0.9  /  DBili  x   /  AST  60<H>  /  ALT  79<H>  /  AlkPhos  284<H>  05-19    Creatinine Trend: 1.33<--, 1.35<--, 1.14<--, 1.04<--, 0.95<--, 0.87<--  PT/INR - ( 20 May 2022 06:55 )   PT: 16.0 sec;   INR: 1.37 ratio         PTT - ( 21 May 2022 03:39 )  PTT:139.2 sec                  CAPILLARY BLOOD GLUCOSE         Dr. Bradley Herbert PGY2    ELYSE MALAVE  77y  MRN: [id number]    Subjective:    Patient is a 77y old  Male who presents with a chief complaint of HF exacerbation (20 May 2022 12:30)  Spoke with Patient at Bedside. Overnight heparin line had bleeding, currently packed. No active complaints. Patient denies Ha/F/N/V/CP/SOB/Abd Pain/D/Dysuria/Swelling      MEDICATIONS  (STANDING):  aspirin enteric coated 81 milliGRAM(s) Oral daily  clopidogrel Tablet 75 milliGRAM(s) Oral daily  furosemide    Tablet 20 milliGRAM(s) Oral daily  heparin  Infusion.  Unit(s)/Hr (16 mL/Hr) IV Continuous <Continuous>  metoprolol tartrate 25 milliGRAM(s) Oral two times a day  mirtazapine 15 milliGRAM(s) Oral at bedtime  tamsulosin 0.4 milliGRAM(s) Oral at bedtime  venlafaxine XR. 150 milliGRAM(s) Oral every 24 hours    MEDICATIONS  (PRN):  acetaminophen     Tablet .. 650 milliGRAM(s) Oral every 6 hours PRN Temp greater or equal to 38C (100.4F), Mild Pain (1 - 3)  clonazePAM  Tablet 0.5 milliGRAM(s) Oral every 12 hours PRN anxiety  heparin   Injectable 7000 Unit(s) IV Push every 6 hours PRN For aPTT less than 40  heparin   Injectable 3500 Unit(s) IV Push every 6 hours PRN For aPTT between 40 - 57        Objective:    Vitals: Vital Signs Last 24 Hrs  T(C): 36.3 (05-21-22 @ 04:37), Max: 36.3 (05-20-22 @ 20:49)  T(F): 97.4 (05-21-22 @ 04:37), Max: 97.4 (05-21-22 @ 04:37)  HR: 61 (05-21-22 @ 04:37) (61 - 69)  BP: 109/74 (05-21-22 @ 04:37) (100/74 - 109/74)  BP(mean): --  RR: 18 (05-21-22 @ 04:37) (17 - 18)  SpO2: 94% (05-21-22 @ 04:37) (94% - 94%)            I&O's Summary    20 May 2022 07:01  -  21 May 2022 06:14  --------------------------------------------------------  IN: 1300 mL / OUT: 1600 mL / NET: -300 mL        PHYSICAL EXAM:  GENERAL: NAD, lying in bed comfortably  HEAD:  Atraumatic, Normocephalic  EYES: conjunctiva and sclera clear  CHEST/LUNG: on RA, mild crackles at bases bilaterally   HEART: Regular rate and rhythm; No murmurs, rubs, or gallops  ABDOMEN: Bowel sounds present; Soft, Nontender, Nondistended.   EXTREMITIES:  trace- 1+ pitting edema at LE b/l. RUE heparin line bleeding   NERVOUS SYSTEM:  Alert & Oriented X3, speech clear. No deficits   MSK: FROM all 4 extremities, full and equal strength  SKIN: No rashes or lesions    LABS:                        12.6   8.71  )-----------( 268      ( 20 May 2022 21:04 )             40.2                         12.5   8.53  )-----------( 245      ( 20 May 2022 06:55 )             39.6                         13.0   9.33  )-----------( 304      ( 19 May 2022 18:17 )             40.8     Hgb Trend: 12.6<--, 12.5<--, 13.0<--, 11.9<--, 12.2<--  05-20    134<L>  |  103  |  37<H>  ----------------------------<  114<H>  4.4   |  18<L>  |  1.33<H>  05-19    134<L>  |  102  |  36<H>  ----------------------------<  134<H>  5.1   |  18<L>  |  1.35<H>    Ca    8.7      20 May 2022 06:55  Ca    9.1      19 May 2022 18:17  Phos  3.7     05-20  Mg     2.2     05-20    TPro  6.1  /  Alb  3.3  /  TBili  0.7  /  DBili  x   /  AST  43<H>  /  ALT  71<H>  /  AlkPhos  240<H>  05-20  TPro  6.8  /  Alb  3.5  /  TBili  0.9  /  DBili  x   /  AST  60<H>  /  ALT  79<H>  /  AlkPhos  284<H>  05-19    Creatinine Trend: 1.33<--, 1.35<--, 1.14<--, 1.04<--, 0.95<--, 0.87<--  PT/INR - ( 20 May 2022 06:55 )   PT: 16.0 sec;   INR: 1.37 ratio         PTT - ( 21 May 2022 03:39 )  PTT:139.2 sec                  CAPILLARY BLOOD GLUCOSE

## 2022-05-21 NOTE — PROGRESS NOTE ADULT - ASSESSMENT
78 yo M w/ history of bipolar disorder, anxiety and BPH, and recent cardiogenic shock 2/2 IWSTEMI now presenting with some relative hypotension and OGLESBY here for question of mild CHF exacerbation at baseline ready for discharge today.  77M Hx Bipolar disorder, anxiety and BPH, and recent cardiogenic shock 2/2 IW NSTEMI p/w relative hypotension with new LV Thrombus now on heparin to coumadin bridge

## 2022-05-21 NOTE — PROVIDER CONTACT NOTE (CRITICAL VALUE NOTIFICATION) - BACKGROUND
Dyspnea, cad. LV thrombus on hep gtt
Admitted for HF exacerbation. Currently on Heparin at 11/ml for coumadin bridge.
dyspnea,cad, urinary retention lv thrombus on heparin gtt

## 2022-05-21 NOTE — PROGRESS NOTE ADULT - PROBLEM SELECTOR PLAN 1
TTE 5/12: EF 35 %, severe LV systolic dysfunction in setting of cardiogenic shock 2/2 IWSTEMI  [facility] showed 100% occlusion for right coronary artery and 70% occlusion of left anterior descending artery.  The arteries were not intervened upon due to viability study showing no viability in the areas supplied by the occluded arteries.  Home meds: Aspirin 81mg,Clopidogrel 75mg, Metoprolol succinate 150mg daily and Losartan 25mg daily (with plans to eventually switch to Entresto). Atorvastatin was started but then held given rise in LFTs.   Now presenting with OGLESBY, nausea, and relative hypotension  CXR shows new small b/l pleural effusions  pro-BNP 8357 (no baseline)  EKG NSR   s/p 20 IV lasix with 500 cc urine output    - Strict I/0  - Repeat TTE showing LV thrombus   - cards consulted, recs appreciated   - continue ASA, hold Statin, low dose metoprolol   - hold losartan in setting of relative hypotension/ questionable BRODY

## 2022-05-21 NOTE — PROGRESS NOTE ADULT - PROBLEM SELECTOR PLAN 3
Was present during previous hospitalization  Likely 2/2 cardiogenic shock and/or hepatic congestion  Held Statin previously   Downtrending     - continue to hold Statin   - CTM Present at previous hospitalization Likely 2/2 cardiogenic shock and/or hepatic congestion  - continue to hold Statin, improving   - CTM

## 2022-05-21 NOTE — PROGRESS NOTE ADULT - ATTENDING COMMENTS
76 yo M w/ history of bipolar disorder, recent admission for NSTEMI, cath showed complete occlusion of RCA and 70% occlusion of the LAD with a cardiac index of 1.7 without viability so no intervention done, EF 35% readmitted w dyspnea.     Found to have an LV thrombus. On heparin gtt bridge to Coumadin. Will d/w Cardiology if Lovenox appropriate.     CHF improving, transitioned to oral

## 2022-05-21 NOTE — PROGRESS NOTE ADULT - PROBLEM SELECTOR PLAN 5
No SI or HI at this time    - continue Clonazepam and mirtazapine and venlafaxine No SI or HI at this time  - continue Clonazepam and mirtazapine and venlafaxine

## 2022-05-21 NOTE — PROGRESS NOTE ADULT - PROBLEM SELECTOR PLAN 2
SBP 90s now   Per hospital records were 110s-120s prior to DC  Could be 2/2 Losartan, continue to hold     - hold Losartan   - low dose Metoprolol and increase as tolerated SBP 100s now Per hospital records were 110s-120s prior to DC  Could be 2/2 Losartan, continue to hold   - hold Losartan; low dose Metoprolol and increase as tolerated

## 2022-05-22 ENCOUNTER — TRANSCRIPTION ENCOUNTER (OUTPATIENT)
Age: 77
End: 2022-05-22

## 2022-05-22 DIAGNOSIS — I51.3 INTRACARDIAC THROMBOSIS, NOT ELSEWHERE CLASSIFIED: ICD-10-CM

## 2022-05-22 LAB
ANION GAP SERPL CALC-SCNC: 12 MMOL/L — SIGNIFICANT CHANGE UP (ref 5–17)
APTT BLD: 78.7 SEC — HIGH (ref 27.5–35.5)
BUN SERPL-MCNC: 23 MG/DL — SIGNIFICANT CHANGE UP (ref 7–23)
CALCIUM SERPL-MCNC: 8.5 MG/DL — SIGNIFICANT CHANGE UP (ref 8.4–10.5)
CHLORIDE SERPL-SCNC: 106 MMOL/L — SIGNIFICANT CHANGE UP (ref 96–108)
CO2 SERPL-SCNC: 20 MMOL/L — LOW (ref 22–31)
CREAT SERPL-MCNC: 1.12 MG/DL — SIGNIFICANT CHANGE UP (ref 0.5–1.3)
EGFR: 68 ML/MIN/1.73M2 — SIGNIFICANT CHANGE UP
GLUCOSE SERPL-MCNC: 94 MG/DL — SIGNIFICANT CHANGE UP (ref 70–99)
HCT VFR BLD CALC: 36.3 % — LOW (ref 39–50)
HGB BLD-MCNC: 11.6 G/DL — LOW (ref 13–17)
INR BLD: 1.46 RATIO — HIGH (ref 0.88–1.16)
LACTATE SERPL-SCNC: 1.4 MMOL/L — SIGNIFICANT CHANGE UP (ref 0.7–2)
MAGNESIUM SERPL-MCNC: 2 MG/DL — SIGNIFICANT CHANGE UP (ref 1.6–2.6)
MCHC RBC-ENTMCNC: 31 PG — SIGNIFICANT CHANGE UP (ref 27–34)
MCHC RBC-ENTMCNC: 32 GM/DL — SIGNIFICANT CHANGE UP (ref 32–36)
MCV RBC AUTO: 97.1 FL — SIGNIFICANT CHANGE UP (ref 80–100)
NRBC # BLD: 0 /100 WBCS — SIGNIFICANT CHANGE UP (ref 0–0)
PHOSPHATE SERPL-MCNC: 3.7 MG/DL — SIGNIFICANT CHANGE UP (ref 2.5–4.5)
PLATELET # BLD AUTO: 243 K/UL — SIGNIFICANT CHANGE UP (ref 150–400)
POTASSIUM SERPL-MCNC: 4.1 MMOL/L — SIGNIFICANT CHANGE UP (ref 3.5–5.3)
POTASSIUM SERPL-SCNC: 4.1 MMOL/L — SIGNIFICANT CHANGE UP (ref 3.5–5.3)
PROTHROM AB SERPL-ACNC: 17 SEC — HIGH (ref 10.5–13.4)
RBC # BLD: 3.74 M/UL — LOW (ref 4.2–5.8)
RBC # FLD: 15.2 % — HIGH (ref 10.3–14.5)
SODIUM SERPL-SCNC: 138 MMOL/L — SIGNIFICANT CHANGE UP (ref 135–145)
WBC # BLD: 7.34 K/UL — SIGNIFICANT CHANGE UP (ref 3.8–10.5)
WBC # FLD AUTO: 7.34 K/UL — SIGNIFICANT CHANGE UP (ref 3.8–10.5)

## 2022-05-22 PROCEDURE — 99232 SBSQ HOSP IP/OBS MODERATE 35: CPT | Mod: GC

## 2022-05-22 PROCEDURE — 99233 SBSQ HOSP IP/OBS HIGH 50: CPT

## 2022-05-22 RX ORDER — LANOLIN ALCOHOL/MO/W.PET/CERES
5 CREAM (GRAM) TOPICAL AT BEDTIME
Refills: 0 | Status: DISCONTINUED | OUTPATIENT
Start: 2022-05-22 | End: 2022-05-26

## 2022-05-22 RX ORDER — WARFARIN SODIUM 2.5 MG/1
5 TABLET ORAL AT BEDTIME
Refills: 0 | Status: COMPLETED | OUTPATIENT
Start: 2022-05-22 | End: 2022-05-22

## 2022-05-22 RX ORDER — LANOLIN ALCOHOL/MO/W.PET/CERES
5 CREAM (GRAM) TOPICAL ONCE
Refills: 0 | Status: COMPLETED | OUTPATIENT
Start: 2022-05-22 | End: 2022-05-22

## 2022-05-22 RX ORDER — LANOLIN ALCOHOL/MO/W.PET/CERES
5 CREAM (GRAM) TOPICAL AT BEDTIME
Refills: 0 | Status: DISCONTINUED | OUTPATIENT
Start: 2022-05-22 | End: 2022-05-22

## 2022-05-22 RX ADMIN — MIRTAZAPINE 15 MILLIGRAM(S): 45 TABLET, ORALLY DISINTEGRATING ORAL at 21:56

## 2022-05-22 RX ADMIN — WARFARIN SODIUM 5 MILLIGRAM(S): 2.5 TABLET ORAL at 21:55

## 2022-05-22 RX ADMIN — Medication 20 MILLIGRAM(S): at 05:17

## 2022-05-22 RX ADMIN — Medication 0.5 MILLIGRAM(S): at 23:47

## 2022-05-22 RX ADMIN — Medication 150 MILLIGRAM(S): at 21:53

## 2022-05-22 RX ADMIN — Medication 81 MILLIGRAM(S): at 11:45

## 2022-05-22 RX ADMIN — HEPARIN SODIUM 1100 UNIT(S)/HR: 5000 INJECTION INTRAVENOUS; SUBCUTANEOUS at 08:29

## 2022-05-22 RX ADMIN — HEPARIN SODIUM 1100 UNIT(S)/HR: 5000 INJECTION INTRAVENOUS; SUBCUTANEOUS at 19:26

## 2022-05-22 RX ADMIN — Medication 25 MILLIGRAM(S): at 05:16

## 2022-05-22 RX ADMIN — HEPARIN SODIUM 1100 UNIT(S)/HR: 5000 INJECTION INTRAVENOUS; SUBCUTANEOUS at 20:08

## 2022-05-22 RX ADMIN — Medication 5 MILLIGRAM(S): at 21:55

## 2022-05-22 RX ADMIN — Medication 25 MILLIGRAM(S): at 17:13

## 2022-05-22 RX ADMIN — TAMSULOSIN HYDROCHLORIDE 0.4 MILLIGRAM(S): 0.4 CAPSULE ORAL at 21:53

## 2022-05-22 RX ADMIN — Medication 0.5 MILLIGRAM(S): at 02:50

## 2022-05-22 RX ADMIN — Medication 5 MILLIGRAM(S): at 03:18

## 2022-05-22 RX ADMIN — CLOPIDOGREL BISULFATE 75 MILLIGRAM(S): 75 TABLET, FILM COATED ORAL at 11:45

## 2022-05-22 NOTE — DISCHARGE NOTE PROVIDER - NSDCADMDATE_GEN_ALL_CORE_FT
-- DO NOT REPLY / DO NOT REPLY ALL --  -- Message is from the [facility]--    General Patient Message      Reason for Call: PATIENT IS REQUESTING A CALL BACK FROM A NURSE REGARDING QUESTIONS ABOUT LABS AS SOON AS POSSIBLE.    Caller Information       Type Contact Phone    02/06/2020 03:16 PM Phone (Incoming) Bal Bandar (Self) [phone] (M)          Alternative phone number: [phone]    Turnaround time given to caller:   \"This message will be sent to [state Provider's name]. The clinical team will fulfill your request as soon as they review your message.\"}    
19-May-2022 19:10

## 2022-05-22 NOTE — DISCHARGE NOTE PROVIDER - CARE PROVIDER_API CALL
Niles Walden)  Cardiology; Internal Medicine  [address], [address]  [place] [address]  Phone: [phone]  Fax: [phone]  Follow Up Time:    Niles Walden)  Cardiology; Internal Medicine  [address], [address]  [place] [address]  Phone: [phone]  Fax: [phone]  Follow Up Time: 1-3 days

## 2022-05-22 NOTE — PROGRESS NOTE ADULT - PROBLEM SELECTOR PLAN 1
TTE 5/12: EF 35 %, severe LV systolic dysfunction in setting of cardiogenic shock 2/2 IWSTEMI  [facility] showed 100% occlusion for right coronary artery and 70% occlusion of left anterior descending artery.  The arteries were not intervened upon due to viability study showing no viability in the areas supplied by the occluded arteries.  Home meds: Aspirin 81mg,Clopidogrel 75mg, Metoprolol succinate 150mg daily and Losartan 25mg daily (with plans to eventually switch to Entresto). Atorvastatin was started but then held given rise in LFTs.   Now presenting with OGLESBY, nausea, and relative hypotension  CXR shows new small b/l pleural effusions  pro-BNP 8357 (no baseline)  EKG NSR   s/p 20 IV lasix with 500 cc urine output    - Strict I/0  - Repeat TTE showing LV thrombus   - cards consulted, recs appreciated   - continue ASA, hold Statin, low dose metoprolol   - hold losartan in setting of relative hypotension/ questionable BRODY TTE 5/12: EF 35 %, severe LV systolic dysfunction in setting of cardiogenic shock 2/2 IWSTEMI  [facility] showed 100% occlusion for right coronary artery and 70% occlusion of left anterior descending artery.  The arteries were not intervened upon due to viability study showing no viability in the areas supplied by the occluded arteries.  Home meds: Aspirin 81mg,Clopidogrel 75mg, Metoprolol succinate 150mg daily and Losartan 25mg daily (with plans to eventually switch to Entresto). Atorvastatin was started but then held given rise in LFTs.   Now presenting with OGLESBY, nausea, and relative hypotension  CXR shows new small b/l pleural effusions  pro-BNP 8357 (no baseline)  EKG NSR   s/p 20 IV lasix with 500 cc urine output    - Strict I/0  - Repeat TTE showing LV thrombus   - cards consulted, recs appreciated   - diurese with 20mg PO lasix daily  - continue ASA, hold Statin, low dose metoprolol   - hold losartan in setting of relative hypotension/ questionable BRODY

## 2022-05-22 NOTE — DISCHARGE NOTE PROVIDER - NSDCFUADDAPPT_GEN_ALL_CORE_FT
You have a follow-up appointment with your Cardiologist (Dr. Niles Walden) on 5/27/22 at 11:00 AM. Please be sure to follow-up with your Cardiologist at this appointment or at most within 1 week after discharge. Please have your INR checked and discuss any potential adjustments in dosing of your warfarin ("Coumadin") and/or furosemide ("Lasix").

## 2022-05-22 NOTE — PROGRESS NOTE ADULT - PROBLEM SELECTOR PLAN 3
Present at previous hospitalization Likely 2/2 cardiogenic shock and/or hepatic congestion  - continue to hold Statin, improving   - CTM SBP 100s now Per hospital records were 110s-120s prior to DC  Could be 2/2 Losartan, continue to hold   - hold Losartan; low dose Metoprolol and increase as tolerated

## 2022-05-22 NOTE — DISCHARGE NOTE PROVIDER - NSDCCPCAREPLAN_GEN_ALL_CORE_FT
PRINCIPAL DISCHARGE DIAGNOSIS  Diagnosis: LV (left ventricular) mural thrombus following MI  Assessment and Plan of Treatment: You were found to have a blood clot in your heart after you had a heart attack a few days ago. Because of this you have to be on blood thinners in addition to your aspirin and plavix. You will now be taking warfarin for at least 3 months. You should continue to take all three medications for one month after discharge which afterwards you are to STOP taking aspirin. You need to follow up with your cardiologist as soon as possible. You should expect shortness or breath and mild nausea given that the original reason for your heart attack was not fixed because it would not have recovered the portion of your heart that was affected. Please come back if your nausea, shortness of breath, or new chest pain starts to occur. You will have to have your blood INR (warfarin/coumadin) levels checked regularly, likely with a goal of 2-3 as determined by your doctor       PRINCIPAL DISCHARGE DIAGNOSIS  Diagnosis: LV (left ventricular) mural thrombus following MI  Assessment and Plan of Treatment: You were found to have a blood clot in your heart after you had a heart attack a few days ago. Because of this you have to be on blood thinners in addition to your aspirin and plavix. You will now be taking warfarin for at least 3 months. You should continue to take all three medications for one month after discharge which afterwards you are to STOP taking aspirin. You need to follow up with your cardiologist as soon as possible. You should expect shortness or breath and mild nausea given that the original reason for your heart attack was not fixed because it would not have recovered the portion of your heart that was affected. Please come back if your nausea, shortness of breath, or new chest pain starts to occur. You will have to have your blood INR (warfarin/coumadin) levels checked regularly, with a goal of 2-3 as determined by your doctor. Please be sure to follow-up with your Primary Care Doctor or Cardiologist to check your INR and adjust your dose of warfarin ("Coumadin") as needed.      SECONDARY DISCHARGE DIAGNOSES  Diagnosis: Congestive heart failure  Assessment and Plan of Treatment: You have a history of heart failure after your heart attack. You were placed on a water pill ("Lasix") regimen and that was increased to optimize the fluid in your body and to improved your respiratory status. Please continue to take your water pill regimen as directed. Please be sure to follow-up closely with your cardiologist to determine any changes to the regimen within 1 week of discharge from the hospital.     PRINCIPAL DISCHARGE DIAGNOSIS  Diagnosis: LV (left ventricular) mural thrombus following MI  Assessment and Plan of Treatment: You were found to have a blood clot in your heart after you had a heart attack a few days prior to this admission. Because of this, you have to be on blood thinners in addition to your Aspirin and Plavix. You will now be taking warfarin for at least 3 months. You should continue to take all three medications until Tuesday 5/31/2022, after which you will STOP taking Aspirin, but continue with Warfarin and Plavix. You need to follow up with your cardiologist as soon as possible. You should expect a little bit of shortness or breath and mild nausea given that the original reason for your heart attack was not fixed because it would not have recovered the portion of your heart that was affected. Please come back if your nausea, shortness of breath, or new chest pain starts to occur. You will have to have your blood INR (warfarin/coumadin) levels checked regularly, with a goal of 2-3 as determined by your doctor. Please be sure to follow-up with your Primary Care Doctor or Cardiologist in the next couple of days to check your INR and adjust your dose of warfarin ("Coumadin") as needed. You have an appointment scheduled with your cardiologist, Dr. Niles Walden, for 5/27/22 at 11:00AM.      SECONDARY DISCHARGE DIAGNOSES  Diagnosis: Congestive heart failure  Assessment and Plan of Treatment: You have a history of heart failure after your heart attack. You were placed on a water pill ("Lasix") regimen and that was increased to optimize the fluid in your body and to improved your respiratory status. Please continue to take your water pill regimen as directed. Please be sure to follow-up closely with your cardiologist to determine any changes to the regimen within 1 week of discharge from the hospital.     PRINCIPAL DISCHARGE DIAGNOSIS  Diagnosis: LV (left ventricular) mural thrombus following MI  Assessment and Plan of Treatment: You were found to have a blood clot in your heart after you had a heart attack a few days prior to this admission. Because of this, you have to be on blood thinners in addition to your Aspirin and Plavix. You will now be taking warfarin for at least 3 months. You should continue to take all three medications until Tuesday 5/31/2022, after which you will STOP taking Aspirin, but continue with Warfarin and Plavix. You need to follow up with your cardiologist as soon as possible. You should expect a little bit of shortness or breath and mild nausea given that the original reason for your heart attack was not fixed because it would not have recovered the portion of your heart that was affected. Please come back if your nausea, shortness of breath, or new chest pain starts to occur. You will have to have your blood INR (warfarin/coumadin) levels checked regularly, with a goal of 2-3 as determined by your doctor. Please be sure to follow-up with your Primary Care Doctor or Cardiologist in the next couple of days to check your INR and adjust your dose of warfarin ("Coumadin") as needed. You have an appointment scheduled with your cardiologist, Dr. Niles Walden, for 5/27/22 at 11:00AM.      SECONDARY DISCHARGE DIAGNOSES  Diagnosis: Congestive heart failure  Assessment and Plan of Treatment: You have a history of heart failure after your heart attack. You were placed on a water pill ("Lasix") regimen and that was increased to optimize the fluid in your body and to improved your respiratory status. Please continue to take your water pill regimen as directed. Your "Losartan" medication was also stopped because of your kidney function; this medication may be resumed depending on what your cardiologist decides. Please be sure to follow-up closely with your cardiologist to determine any changes to the regimen within 1 week of discharge from the hospital.

## 2022-05-22 NOTE — PROGRESS NOTE ADULT - PROBLEM SELECTOR PLAN 5
No SI or HI at this time  - continue Clonazepam and mirtazapine and venlafaxine SCr 1.35 while baseline 1.0-1.1 likely 2/2 new Losartan use  - hold Losartan for now, c/w lasix 20 po qd   - Avoid nephrotoxic agents, ctm

## 2022-05-22 NOTE — DISCHARGE NOTE PROVIDER - NSDCMRMEDTOKEN_GEN_ALL_CORE_FT
aspirin 81 mg oral delayed release tablet: 1 tab(s) orally once a day  clonazePAM 0.25 mg oral tablet, disintegratin tab(s) orally every 8 hours, As Needed MDD:3 pills  clonazePAM 0.5 mg oral tablet: 1 tab(s) orally 2 times a day x 30 days MDD:1mg  anxiety  clopidogrel 75 mg oral tablet: 1 tab(s) orally once a day  Effexor  mg oral capsule, extended release: 1 cap(s) orally once a day   Flomax 0.4 mg oral capsule: 1 cap(s) orally once a day  losartan 25 mg oral tablet: 1 tab(s) orally once a day  metoprolol succinate 50 mg oral tablet, extended release: 3 tab(s) orally once a day  mirtazapine 7.5 mg oral tablet: 2 tab(s) orally once a day (at bedtime)   Zofran 4 mg oral tablet: 1 tab(s) orally every 8 hours, As Needed -for nausea    aspirin 81 mg oral delayed release tablet: 1 tab(s) orally once a day (Please STOP taking Aspirin after 2022  clonazePAM 0.25 mg oral tablet, disintegratin tab(s) orally every 8 hours, As Needed MDD:3 pills  clonazePAM 0.5 mg oral tablet: 1 tab(s) orally 2 times a day x 30 days MDD:1mg  anxiety  clopidogrel 75 mg oral tablet: 1 tab(s) orally once a day  Effexor  mg oral capsule, extended release: 1 cap(s) orally once a day   Flomax 0.4 mg oral capsule: 1 cap(s) orally once a day  Lasix 40 mg oral tablet: 1 tab(s) orally 2 times a day   Lipitor 80 mg oral tablet: 1 tab(s) orally once a day (at bedtime)  Metoprolol Succinate  mg oral tablet, extended release: 1 tab(s) orally once a day   mirtazapine 7.5 mg oral tablet: 2 tab(s) orally once a day (at bedtime)    aspirin 81 mg oral delayed release tablet: 1 tab(s) orally once a day (Please STOP taking Aspirin after 2022  clonazePAM 0.25 mg oral tablet, disintegratin tab(s) orally every 8 hours, As Needed MDD:3 pills  clonazePAM 0.5 mg oral tablet: 1 tab(s) orally 2 times a day x 30 days MDD:1mg  anxiety  clopidogrel 75 mg oral tablet: 1 tab(s) orally once a day  Effexor  mg oral capsule, extended release: 1 cap(s) orally once a day   Flomax 0.4 mg oral capsule: 1 cap(s) orally once a day  Lasix 40 mg oral tablet: 1 tab(s) orally 2 times a day   Lipitor 80 mg oral tablet: 1 tab(s) orally once a day (at bedtime)  Metoprolol Succinate  mg oral tablet, extended release: 1 tab(s) orally once a day   mirtazapine 7.5 mg oral tablet: 2 tab(s) orally once a day (at bedtime)   warfarin 4 mg oral tablet: 1 tab(s) orally once a day (at bedtime)

## 2022-05-22 NOTE — PROGRESS NOTE ADULT - ASSESSMENT
77 year old male with past medical history of HLD, bipolar disorder, anxiety, and BPH.  He presented recently  with STEMI, s/p [facility] 5/11; study remarkable for complete occlusion of RCA, 70% occlusion of LAD, CI 1.7.  Treated with IABP.  Viability scan with minimal viability in the infarcted territories.  Now representing with SOB. TTE today done showing EF 30%, UZAIR 42, severe global LV systolic dysfunction, LV thrombus measuring 1.0 by 1.3 seen in LV apex, the mid anterior septum, the basal anterior septum, the mid anterior wall, and the mid inferoseptum are akinetic. Severe reversible diastolic dysfunction. PASP 35.   Troponin 70 on Cr 1.3, low concern for ACS, no chest pain.    REC:  1.  LV thrombus  --Heparin gtt, begin warfarin dosing once stably therapeutic, goal 2-2.5.  -Today INR is 1.46    2.  CHF  --Consider diuresis, would discharge on 20 oral lasix to treat SOB/maintain euvolemia  --metoprolol 25mg BID, when ready for discharge change to toprol xl 50 mg daily    --Eventual reintroduction of losartan if tolerating.  --Strict I/Os at all times. Generally, a gerardo catheter is discouraged due to risk for nosocomial infection.  --Please monitor on telemetry  --Please obtain STANDING daily morning pre-prandial post-voiding weights  --K >4 Mg > 2    3.  CAD s/p recent STEMI  --c/w DAPT, will eventually d/c aspirin to avoid triple therapy once warf is therapeutic   --Start Lipitor.      77 year old male with past medical history of HLD, bipolar disorder, anxiety, and BPH.  He presented recently  with STEMI, s/p [facility] 5/11; study remarkable for complete occlusion of RCA, 70% occlusion of LAD, CI 1.7.  Treated with IABP.  Viability scan with minimal viability in the infarcted territories.  Now representing with SOB. TTE today done showing EF 30%, UZAIR 42, severe global LV systolic dysfunction, LV thrombus measuring 1.0 by 1.3 seen in LV apex, the mid anterior septum, the basal anterior septum, the mid anterior wall, and the mid inferoseptum are akinetic. Severe reversible diastolic dysfunction. PASP 35.   Troponin 70 on Cr 1.3, low concern for ACS, no chest pain.    REC:  1.  LV thrombus  --Heparin gtt, begin warfarin dosing once stably therapeutic, goal 2-2.5.  -Today INR is 1.46    2.  CHF  --continue with 20 oral lasix to treat SOB/maintain euvolemia  --metoprolol 25mg BID, when ready for discharge change to toprol xl 50 mg daily    --Eventual reintroduction of losartan if tolerating.  --Strict I/Os at all times. Generally, a gerardo catheter is discouraged due to risk for nosocomial infection.  --Please monitor on telemetry  --Please obtain STANDING daily morning pre-prandial post-voiding weights  --K >4 Mg > 2    3.  CAD s/p recent STEMI  --c/w DAPT, will eventually d/c aspirin to avoid triple therapy once warf is therapeutic   --Start Lipitor.

## 2022-05-22 NOTE — PROGRESS NOTE ADULT - PROBLEM SELECTOR PLAN 7
DVT ppx: On heparin and coumadin bridge  Diet: DASH   Dispo: Home  Communication: Spoke with wife Kenia on phone at 11:14AM 5/22

## 2022-05-22 NOTE — PROGRESS NOTE ADULT - ATTENDING COMMENTS
77M Hx Bipolar disorder, anxiety and BPH, and recent cardiogenic shock 2/2 IW NSTEMI p/w relative hypotension with new LV Thrombus now on heparin to coumadin bridge    #ADHF: improving on diuretics. c/w Aspirin 81mg,Clopidogrel 75mg, Metoprolol succinate 150mg daily and Losartan 25mg daily (with plans to eventually switch to Entresto). Atorvastatin was started but then held given rise in LFTs.   #CAD s/p IWSTEMI c/w DAPT, will discuss with cardiology about whether to continue triple therapy for this patient   #LV thrombus: c/w heparin bride to coumadin.

## 2022-05-22 NOTE — PROGRESS NOTE ADULT - PROBLEM SELECTOR PLAN 6
DVT ppx: On heparin likely change to lovenox and coumadin bridge  Diet: DASH   Dispo: Home  Communication: Spoke with patient and wife at bedside around 12:45PM 5/20/22, wife Helder at 5/21 10:21AM No SI or HI at this time  - continue Clonazepam and mirtazapine and venlafaxine

## 2022-05-22 NOTE — PROGRESS NOTE ADULT - PROBLEM SELECTOR PLAN 4
SCr 1.35 while baseline 1.0-1.1 likely 2/2 new Losartan use  - hold Losartan for now, c/w lasix 20 po qd   - Avoid nephrotoxic agents, ctm Present at previous hospitalization Likely 2/2 cardiogenic shock and/or hepatic congestion  - continue to hold Statin, improving   - CTM

## 2022-05-22 NOTE — DISCHARGE NOTE PROVIDER - NSDCCPTREATMENT_GEN_ALL_CORE_FT
PRINCIPAL PROCEDURE  Procedure: 2D echo  Findings and Treatment: Conclusions:  1. Tethered mitral valve leaflets with normal opening.  Moderate mitral regurgitation.  2. Minimal aortic regurgitation.  3. Endocardial visualization enhanced with intravenous  injection of Ultrasonic Enhancing Agent (Lumason). Severe  global left ventricular systolic dysfunction.  Left  ventricular thrombus measuring 1.0 by 1.3 cm seen in LV  apex.  4. Severe reversible diastolic dysfunction (Stage III).  5. Right ventricular enlargement with decreased right  ventricular systolic function.  6. Small pericardial effusion. No evidence of right atrial  or right ventricular collapse. No echocardiographic  evidence of pericardial tamponade.  7. Bilateral pleural effusions.< from: TTE with Doppler (w/Cont) (05.20.22 @ 09:51) >

## 2022-05-22 NOTE — PROGRESS NOTE ADULT - ASSESSMENT
77M Hx Bipolar disorder, anxiety and BPH, and recent cardiogenic shock 2/2 IW NSTEMI p/w relative hypotension with new LV Thrombus now on heparin to coumadin bridge

## 2022-05-22 NOTE — PROGRESS NOTE ADULT - PROBLEM SELECTOR PLAN 2
SBP 100s now Per hospital records were 110s-120s prior to DC  Could be 2/2 Losartan, continue to hold   - hold Losartan; low dose Metoprolol and increase as tolerated - new LV thrombus seen on TTE on admission  - continue heparin bridge to warfarin  - dose 5mg warfarin 5/22 night  - continue to monitor INR

## 2022-05-22 NOTE — DISCHARGE NOTE PROVIDER - CARE PROVIDERS DIRECT ADDRESSES
[email or web address]

## 2022-05-22 NOTE — PROGRESS NOTE ADULT - SUBJECTIVE AND OBJECTIVE BOX
Gypsy Cantrell MD  Cardiology Fellow  [phone]  All Cardiology service information can be found 24/7 on [email or web address], password: [email or web address]      Overnight Events:     Review Of Systems: No chest pain, shortness of breath, or palpitations            Current Meds:  acetaminophen     Tablet .. 650 milliGRAM(s) Oral every 6 hours PRN  aspirin enteric coated 81 milliGRAM(s) Oral daily  clonazePAM  Tablet 0.5 milliGRAM(s) Oral every 12 hours PRN  clopidogrel Tablet 75 milliGRAM(s) Oral daily  furosemide    Tablet 20 milliGRAM(s) Oral daily  heparin   Injectable 7000 Unit(s) IV Push every 6 hours PRN  heparin   Injectable 3500 Unit(s) IV Push every 6 hours PRN  heparin  Infusion.  Unit(s)/Hr IV Continuous <Continuous>  melatonin 5 milliGRAM(s) Oral at bedtime  metoprolol tartrate 25 milliGRAM(s) Oral two times a day  mirtazapine 15 milliGRAM(s) Oral at bedtime  tamsulosin 0.4 milliGRAM(s) Oral at bedtime  venlafaxine XR. 150 milliGRAM(s) Oral every 24 hours      Vitals:  T(F): 97.6 (05-21), Max: 98.3 (05-21)  HR: 75 (05-22) (70 - 76)  BP: 107/64 (05-22) (107/64 - 120/79)  RR: 18 (05-22)  SpO2: 94% (05-22)  I&O's Summary    21 May 2022 07:01  -  22 May 2022 07:00  --------------------------------------------------------  IN: 660 mL / OUT: 0 mL / NET: 660 mL        Physical Exam:  Appearance: No acute distress; well appearing  Eyes: PERRL, EOMI, pink conjunctiva  HEENT: Normal oral mucosa  Cardiovascular: RRR, S1, S2, no murmurs, rubs, or gallops; no edema; no JVD  Respiratory: Clear to auscultation bilaterally  Gastrointestinal: soft, non-tender, non-distended with normal bowel sounds  Musculoskeletal: No clubbing; no joint deformity   Neurologic: Non-focal  Lymphatic: No lymphadenopathy  Psychiatry: AAOx3, mood & affect appropriate  Skin: No rashes, ecchymoses, or cyanosis                          11.6   7.34  )-----------( 243      ( 22 May 2022 06:56 )             36.3     05-21    138  |  105  |  28<H>  ----------------------------<  109<H>  4.3   |  22  |  1.15    Ca    9.0      21 May 2022 06:34  Phos  3.3     05-21  Mg     2.2     05-21    TPro  6.6  /  Alb  3.3  /  TBili  1.0  /  DBili  x   /  AST  44<H>  /  ALT  66<H>  /  AlkPhos  246<H>  05-21    PT/INR - ( 22 May 2022 06:56 )   PT: 17.0 sec;   INR: 1.46 ratio         PTT - ( 22 May 2022 06:56 )  PTT:78.7 sec  CARDIAC MARKERS ( 19 May 2022 18:17 )  70 ng/L / x     / x     / x     / x     / x          Serum Pro-Brain Natriuretic Peptide: 8357 pg/mL (05-19 @ 18:17)       Gypsy Cantrell MD  Cardiology Fellow  [phone]  All Cardiology service information can be found 24/7 on [email or web address], password: [email or web address]      Overnight Events: Patient states he is feeling well. He is able to walk around without issue however he still has some SOB.     Review Of Systems: No chest pain, or palpitations            Current Meds:  acetaminophen     Tablet .. 650 milliGRAM(s) Oral every 6 hours PRN  aspirin enteric coated 81 milliGRAM(s) Oral daily  clonazePAM  Tablet 0.5 milliGRAM(s) Oral every 12 hours PRN  clopidogrel Tablet 75 milliGRAM(s) Oral daily  furosemide    Tablet 20 milliGRAM(s) Oral daily  heparin   Injectable 7000 Unit(s) IV Push every 6 hours PRN  heparin   Injectable 3500 Unit(s) IV Push every 6 hours PRN  heparin  Infusion.  Unit(s)/Hr IV Continuous <Continuous>  melatonin 5 milliGRAM(s) Oral at bedtime  metoprolol tartrate 25 milliGRAM(s) Oral two times a day  mirtazapine 15 milliGRAM(s) Oral at bedtime  tamsulosin 0.4 milliGRAM(s) Oral at bedtime  venlafaxine XR. 150 milliGRAM(s) Oral every 24 hours      Vitals:  T(F): 97.6 (05-21), Max: 98.3 (05-21)  HR: 75 (05-22) (70 - 76)  BP: 107/64 (05-22) (107/64 - 120/79)  RR: 18 (05-22)  SpO2: 94% (05-22)  I&O's Summary    21 May 2022 07:01  -  22 May 2022 07:00  --------------------------------------------------------  IN: 660 mL / OUT: 0 mL / NET: 660 mL        Physical Exam:  Appearance: No acute distress; well appearing  Eyes: PERRL, EOMI, pink conjunctiva  HEENT: Normal oral mucosa  Cardiovascular: RRR, S1, S2, no murmurs, rubs, or gallops; no edema; no JVD  Respiratory: Clear to auscultation bilaterally  Gastrointestinal: soft, non-tender, non-distended with normal bowel sounds  Musculoskeletal: No clubbing; no joint deformity   Neurologic: Non-focal  Lymphatic: No lymphadenopathy  Psychiatry: AAOx3, mood & affect appropriate  Skin: No rashes, ecchymoses, or cyanosis                          11.6   7.34  )-----------( 243      ( 22 May 2022 06:56 )             36.3     05-21    138  |  105  |  28<H>  ----------------------------<  109<H>  4.3   |  22  |  1.15    Ca    9.0      21 May 2022 06:34  Phos  3.3     05-21  Mg     2.2     05-21    TPro  6.6  /  Alb  3.3  /  TBili  1.0  /  DBili  x   /  AST  44<H>  /  ALT  66<H>  /  AlkPhos  246<H>  05-21    PT/INR - ( 22 May 2022 06:56 )   PT: 17.0 sec;   INR: 1.46 ratio         PTT - ( 22 May 2022 06:56 )  PTT:78.7 sec  CARDIAC MARKERS ( 19 May 2022 18:17 )  70 ng/L / x     / x     / x     / x     / x          Serum Pro-Brain Natriuretic Peptide: 8357 pg/mL (05-19 @ 18:17)

## 2022-05-22 NOTE — DISCHARGE NOTE PROVIDER - HOSPITAL COURSE
HPI:  78 yo M w/ history of bipolar disorder, anxiety and BPH, and recent cardiogenic shock 2/2 IWSTEMI now presenting with some nausea, and concern for hypotension. Patient states he was feeling nausea, no vomiting. Felt dyspneic on exertion but this was not new since discharge. States he came in due to concern for low BP ever since he was discharged, stating his SBP was in 90s, and diastolic was 50s.    Patient was recently hospitalized on May 11-17,  LHC showed 100% occlusion for right coronary artery and 70% occlusion of left anterior descending artery.  The arteries were not intervened upon due to viability study showing no viability in the areas supplied by the occluded arteries. The patient was in cardiac ICU with an intra-aortic balloon pump in place for a couple days in setting of cardiogenic shock. Ejection fraction on TTE was 35%. His condition improved and he was down graded to general medicine floors to uptitrate goal directed medical therapies. He was discharged 2 days prior on Aspirin 81mg,Clopidogrel 75mg, Metoprolol succinate 150mg daily and Losartan 25mg daily (with plans to eventually switch to Entresto). Atorvastatin was started but then held given rise in LFTs.   BP during hospitalization was 120s-110s.   Patient states he was taking medications as prescribed     Hospital Course:   Pt admitted to hospital, found to have small b/l pleural effusions on xray given lasix to be transitioned to 20 po lasix per day. Mild BRODY so withheld losartan. TTE done, found to have new 1x 1.3cm LV thrombus on heparin to warfarin bridge to be triple anticoagulated for 1 month which afterwards he should stop aspirin. Patient is stable to be discahrged home. HPI:  76 yo M w/ history of bipolar disorder, anxiety and BPH, and recent cardiogenic shock 2/2 IWSTEMI now presenting with some nausea, and concern for hypotension. Patient states he was feeling nausea, no vomiting. Felt dyspneic on exertion but this was not new since discharge. States he came in due to concern for low BP ever since he was discharged, stating his SBP was in 90s, and diastolic was 50s.    Patient was recently hospitalized on May 11-17,  LHC showed 100% occlusion for right coronary artery and 70% occlusion of left anterior descending artery.  The arteries were not intervened upon due to viability study showing no viability in the areas supplied by the occluded arteries. The patient was in cardiac ICU with an intra-aortic balloon pump in place for a couple days in setting of cardiogenic shock. Ejection fraction on TTE was 35%. His condition improved and he was down graded to general medicine floors to uptitrate goal directed medical therapies. He was discharged 2 days prior on Aspirin 81mg,Clopidogrel 75mg, Metoprolol succinate 150mg daily and Losartan 25mg daily (with plans to eventually switch to Entresto). Atorvastatin was started but then held given rise in LFTs.   BP during hospitalization was 120s-110s.   Patient states he was taking medications as prescribed     Hospital Course:   Pt admitted to hospital, found to have small b/l pleural effusions on xray given lasix to be transitioned to Lasix 20mg PO per day. Mild BRODY so withheld losartan. TTE done, found to have new 1x 1.3cm LV thrombus on heparin to warfarin bridge to be triple anticoagulated for 3 weeks which afterwards he should stop aspirin (5/31). Pt had 13 beats of WCT and metoprolol tartrate was increased to 50mg BID. Pt and wife noted more dyspnea on exertion and so Lasix was increased to 40mg PO BID to optimize his fluid status. Patient remained stable and discharged home with close follow-up outpatient with his cardiologist (Dr. Niles Walden).

## 2022-05-23 LAB
ALBUMIN SERPL ELPH-MCNC: 3.1 G/DL — LOW (ref 3.3–5)
ALP SERPL-CCNC: 181 U/L — HIGH (ref 40–120)
ALT FLD-CCNC: 48 U/L — HIGH (ref 10–45)
ANION GAP SERPL CALC-SCNC: 11 MMOL/L — SIGNIFICANT CHANGE UP (ref 5–17)
APTT BLD: 120.9 SEC — CRITICAL HIGH (ref 27.5–35.5)
APTT BLD: 68.2 SEC — HIGH (ref 27.5–35.5)
APTT BLD: 98.2 SEC — HIGH (ref 27.5–35.5)
AST SERPL-CCNC: 37 U/L — SIGNIFICANT CHANGE UP (ref 10–40)
BILIRUB SERPL-MCNC: 0.6 MG/DL — SIGNIFICANT CHANGE UP (ref 0.2–1.2)
BUN SERPL-MCNC: 20 MG/DL — SIGNIFICANT CHANGE UP (ref 7–23)
CALCIUM SERPL-MCNC: 9 MG/DL — SIGNIFICANT CHANGE UP (ref 8.4–10.5)
CHLORIDE SERPL-SCNC: 105 MMOL/L — SIGNIFICANT CHANGE UP (ref 96–108)
CO2 SERPL-SCNC: 23 MMOL/L — SIGNIFICANT CHANGE UP (ref 22–31)
CREAT SERPL-MCNC: 1.18 MG/DL — SIGNIFICANT CHANGE UP (ref 0.5–1.3)
EGFR: 64 ML/MIN/1.73M2 — SIGNIFICANT CHANGE UP
GLUCOSE SERPL-MCNC: 105 MG/DL — HIGH (ref 70–99)
HCT VFR BLD CALC: 38 % — LOW (ref 39–50)
HGB BLD-MCNC: 12.2 G/DL — LOW (ref 13–17)
INR BLD: 1.77 RATIO — HIGH (ref 0.88–1.16)
MAGNESIUM SERPL-MCNC: 2.1 MG/DL — SIGNIFICANT CHANGE UP (ref 1.6–2.6)
MCHC RBC-ENTMCNC: 31.3 PG — SIGNIFICANT CHANGE UP (ref 27–34)
MCHC RBC-ENTMCNC: 32.1 GM/DL — SIGNIFICANT CHANGE UP (ref 32–36)
MCV RBC AUTO: 97.4 FL — SIGNIFICANT CHANGE UP (ref 80–100)
NRBC # BLD: 0 /100 WBCS — SIGNIFICANT CHANGE UP (ref 0–0)
PHOSPHATE SERPL-MCNC: 3.6 MG/DL — SIGNIFICANT CHANGE UP (ref 2.5–4.5)
PLATELET # BLD AUTO: 245 K/UL — SIGNIFICANT CHANGE UP (ref 150–400)
POTASSIUM SERPL-MCNC: 4.1 MMOL/L — SIGNIFICANT CHANGE UP (ref 3.5–5.3)
POTASSIUM SERPL-SCNC: 4.1 MMOL/L — SIGNIFICANT CHANGE UP (ref 3.5–5.3)
PROT SERPL-MCNC: 6.4 G/DL — SIGNIFICANT CHANGE UP (ref 6–8.3)
PROTHROM AB SERPL-ACNC: 20.7 SEC — HIGH (ref 10.5–13.4)
RBC # BLD: 3.9 M/UL — LOW (ref 4.2–5.8)
RBC # FLD: 15.6 % — HIGH (ref 10.3–14.5)
SODIUM SERPL-SCNC: 139 MMOL/L — SIGNIFICANT CHANGE UP (ref 135–145)
WBC # BLD: 6.61 K/UL — SIGNIFICANT CHANGE UP (ref 3.8–10.5)
WBC # FLD AUTO: 6.61 K/UL — SIGNIFICANT CHANGE UP (ref 3.8–10.5)

## 2022-05-23 PROCEDURE — 99232 SBSQ HOSP IP/OBS MODERATE 35: CPT | Mod: GC

## 2022-05-23 RX ORDER — ATORVASTATIN CALCIUM 80 MG/1
80 TABLET, FILM COATED ORAL AT BEDTIME
Refills: 0 | Status: DISCONTINUED | OUTPATIENT
Start: 2022-05-23 | End: 2022-05-26

## 2022-05-23 RX ORDER — WARFARIN SODIUM 2.5 MG/1
5 TABLET ORAL AT BEDTIME
Refills: 0 | Status: DISCONTINUED | OUTPATIENT
Start: 2022-05-23 | End: 2022-05-23

## 2022-05-23 RX ORDER — WARFARIN SODIUM 2.5 MG/1
6 TABLET ORAL AT BEDTIME
Refills: 0 | Status: COMPLETED | OUTPATIENT
Start: 2022-05-23 | End: 2022-05-23

## 2022-05-23 RX ADMIN — HEPARIN SODIUM 900 UNIT(S)/HR: 5000 INJECTION INTRAVENOUS; SUBCUTANEOUS at 16:02

## 2022-05-23 RX ADMIN — Medication 5 MILLIGRAM(S): at 21:28

## 2022-05-23 RX ADMIN — TAMSULOSIN HYDROCHLORIDE 0.4 MILLIGRAM(S): 0.4 CAPSULE ORAL at 21:28

## 2022-05-23 RX ADMIN — Medication 0.5 MILLIGRAM(S): at 23:35

## 2022-05-23 RX ADMIN — ATORVASTATIN CALCIUM 80 MILLIGRAM(S): 80 TABLET, FILM COATED ORAL at 21:28

## 2022-05-23 RX ADMIN — CLOPIDOGREL BISULFATE 75 MILLIGRAM(S): 75 TABLET, FILM COATED ORAL at 11:39

## 2022-05-23 RX ADMIN — MIRTAZAPINE 15 MILLIGRAM(S): 45 TABLET, ORALLY DISINTEGRATING ORAL at 21:28

## 2022-05-23 RX ADMIN — WARFARIN SODIUM 6 MILLIGRAM(S): 2.5 TABLET ORAL at 21:28

## 2022-05-23 RX ADMIN — Medication 25 MILLIGRAM(S): at 06:06

## 2022-05-23 RX ADMIN — Medication 150 MILLIGRAM(S): at 21:27

## 2022-05-23 RX ADMIN — Medication 25 MILLIGRAM(S): at 17:34

## 2022-05-23 RX ADMIN — HEPARIN SODIUM 900 UNIT(S)/HR: 5000 INJECTION INTRAVENOUS; SUBCUTANEOUS at 23:58

## 2022-05-23 RX ADMIN — Medication 81 MILLIGRAM(S): at 11:39

## 2022-05-23 RX ADMIN — HEPARIN SODIUM 900 UNIT(S)/HR: 5000 INJECTION INTRAVENOUS; SUBCUTANEOUS at 20:01

## 2022-05-23 RX ADMIN — Medication 20 MILLIGRAM(S): at 06:05

## 2022-05-23 RX ADMIN — HEPARIN SODIUM 900 UNIT(S)/HR: 5000 INJECTION INTRAVENOUS; SUBCUTANEOUS at 08:09

## 2022-05-23 NOTE — PROGRESS NOTE ADULT - SUBJECTIVE AND OBJECTIVE BOX
PROGRESS NOTE:   Authored by Dr. Ian Baldwin MD    Patient is a 77y old  Male who presents with a chief complaint of HF exercerbation (22 May 2022 11:01)      SUBJECTIVE / OVERNIGHT EVENTS:  - Overnight, no acute events.  - Today, pt seen and examined at bedside in AM.    ADDITIONAL REVIEW OF SYSTEMS:  Denied f/c/n/v, CP, SOB, abdominal pain, dysuria, hematuria, hematochezia, melena, diarrhea, constipation.      MEDICATIONS  (STANDING):  aspirin enteric coated 81 milliGRAM(s) Oral daily  clopidogrel Tablet 75 milliGRAM(s) Oral daily  furosemide    Tablet 20 milliGRAM(s) Oral daily  heparin  Infusion.  Unit(s)/Hr (16 mL/Hr) IV Continuous <Continuous>  melatonin 5 milliGRAM(s) Oral at bedtime  metoprolol tartrate 25 milliGRAM(s) Oral two times a day  mirtazapine 15 milliGRAM(s) Oral at bedtime  tamsulosin 0.4 milliGRAM(s) Oral at bedtime  venlafaxine XR. 150 milliGRAM(s) Oral every 24 hours    MEDICATIONS  (PRN):  acetaminophen     Tablet .. 650 milliGRAM(s) Oral every 6 hours PRN Temp greater or equal to 38C (100.4F), Mild Pain (1 - 3)  clonazePAM  Tablet 0.5 milliGRAM(s) Oral every 12 hours PRN anxiety  heparin   Injectable 7000 Unit(s) IV Push every 6 hours PRN For aPTT less than 40  heparin   Injectable 3500 Unit(s) IV Push every 6 hours PRN For aPTT between 40 - 57      CAPILLARY BLOOD GLUCOSE        I&O's Summary    22 May 2022 07:01  -  23 May 2022 07:00  --------------------------------------------------------  IN: 1224 mL / OUT: 0 mL / NET: 1224 mL        PHYSICAL EXAM:  Vital Signs Last 24 Hrs  T(C): 36.4 (23 May 2022 04:12), Max: 36.7 (22 May 2022 17:10)  T(F): 97.5 (23 May 2022 04:12), Max: 98.1 (22 May 2022 17:10)  HR: 87 (23 May 2022 04:12) (73 - 87)  BP: 109/70 (23 May 2022 04:12) (101/67 - 110/69)  BP(mean): --  RR: 18 (23 May 2022 04:12) (18 - 18)  SpO2: 91% (23 May 2022 04:12) (91% - 96%)    GENERAL: NAD, well-developed  HEENT: sclera clear  CHEST/LUNG: Clear to auscultation bilaterally; No wheezes or rales  HEART: Regular rate and rhythm; No murmurs, rubs, or gallops  ABDOMEN: Soft, Nontender, Nondistended; Bowel sounds present  EXTREMITIES:  2+ Peripheral Pulses, No clubbing or cyanosis; trace pitting edema in b/l LEs  PSYCH: AAOx3  NEUROLOGY: non-focal  SKIN: No rashes or lesions    LABS:                        11.6   7.34  )-----------( 243      ( 22 May 2022 06:56 )             36.3     05-22    138  |  106  |  23  ----------------------------<  94  4.1   |  20<L>  |  1.12    Ca    8.5      22 May 2022 06:56  Phos  3.7     05-22  Mg     2.0     05-22      PT/INR - ( 22 May 2022 06:56 )   PT: 17.0 sec;   INR: 1.46 ratio         PTT - ( 22 May 2022 06:56 )  PTT:78.7 sec            RADIOLOGY & ADDITIONAL TESTS:  No new imaging   PROGRESS NOTE:   Authored by Dr. Ian Baldwin MD    Patient is a 77y old  Male who presents with a chief complaint of HF exercerbation (22 May 2022 11:01)      SUBJECTIVE / OVERNIGHT EVENTS:  - Overnight, no acute events.  - Today, pt seen and examined at bedside in AM. Pt reported feeling well and eager to go home. Noted feeling much better overall though still has a little bit of dyspnea on exertion. Denied f/c/n/v, CP, SOB, abdominal pain, dysuria, hematuria, hematochezia, melena, diarrhea, constipation.    MEDICATIONS  (STANDING):  aspirin enteric coated 81 milliGRAM(s) Oral daily  clopidogrel Tablet 75 milliGRAM(s) Oral daily  furosemide    Tablet 20 milliGRAM(s) Oral daily  heparin  Infusion.  Unit(s)/Hr (16 mL/Hr) IV Continuous <Continuous>  melatonin 5 milliGRAM(s) Oral at bedtime  metoprolol tartrate 25 milliGRAM(s) Oral two times a day  mirtazapine 15 milliGRAM(s) Oral at bedtime  tamsulosin 0.4 milliGRAM(s) Oral at bedtime  venlafaxine XR. 150 milliGRAM(s) Oral every 24 hours    MEDICATIONS  (PRN):  acetaminophen     Tablet .. 650 milliGRAM(s) Oral every 6 hours PRN Temp greater or equal to 38C (100.4F), Mild Pain (1 - 3)  clonazePAM  Tablet 0.5 milliGRAM(s) Oral every 12 hours PRN anxiety  heparin   Injectable 7000 Unit(s) IV Push every 6 hours PRN For aPTT less than 40  heparin   Injectable 3500 Unit(s) IV Push every 6 hours PRN For aPTT between 40 - 57      CAPILLARY BLOOD GLUCOSE        I&O's Summary    22 May 2022 07:01  -  23 May 2022 07:00  --------------------------------------------------------  IN: 1224 mL / OUT: 0 mL / NET: 1224 mL        PHYSICAL EXAM:  Vital Signs Last 24 Hrs  T(C): 36.4 (23 May 2022 04:12), Max: 36.7 (22 May 2022 17:10)  T(F): 97.5 (23 May 2022 04:12), Max: 98.1 (22 May 2022 17:10)  HR: 87 (23 May 2022 04:12) (73 - 87)  BP: 109/70 (23 May 2022 04:12) (101/67 - 110/69)  BP(mean): --  RR: 18 (23 May 2022 04:12) (18 - 18)  SpO2: 91% (23 May 2022 04:12) (91% - 96%)    GENERAL: NAD, well-developed  HEENT: sclera clear  CHEST/LUNG: Clear to auscultation bilaterally; No wheezes or rales  HEART: Regular rate and rhythm; No murmurs, rubs, or gallops  ABDOMEN: Soft, Nontender, Nondistended; Bowel sounds present  EXTREMITIES:  2+ Peripheral Pulses, No clubbing or cyanosis; trace pitting edema in b/l LEs  PSYCH: AAOx3  NEUROLOGY: non-focal  SKIN: No rashes or lesions    LABS:                        11.6   7.34  )-----------( 243      ( 22 May 2022 06:56 )             36.3     05-22    138  |  106  |  23  ----------------------------<  94  4.1   |  20<L>  |  1.12    Ca    8.5      22 May 2022 06:56  Phos  3.7     05-22  Mg     2.0     05-22      PT/INR - ( 22 May 2022 06:56 )   PT: 17.0 sec;   INR: 1.46 ratio         PTT - ( 22 May 2022 06:56 )  PTT:78.7 sec            RADIOLOGY & ADDITIONAL TESTS:  No new imaging

## 2022-05-23 NOTE — PROGRESS NOTE ADULT - PROBLEM SELECTOR PLAN 5
SCr 1.35 while baseline 1.0-1.1 likely 2/2 new Losartan use  - hold Losartan for now, c/w lasix 20 po qd   - Avoid nephrotoxic agents, ctm SCr 1.35 while baseline 1.0-1.1 likely 2/2 new Losartan use  - Hold Losartan for now, c/w lasix 20 po qd   - Avoid nephrotoxic agents  - Now downtrending back to baseline

## 2022-05-23 NOTE — PROGRESS NOTE ADULT - PROBLEM SELECTOR PLAN 4
Present at previous hospitalization Likely 2/2 cardiogenic shock and/or hepatic congestion  - continue to hold Statin, improving   - CTM Present at previous hospitalization Likely 2/2 cardiogenic shock and/or hepatic congestion  - downtrending back to wnl  - Per Cardiology, restarted atorvastatin for CAD and recent IWSTEMI

## 2022-05-23 NOTE — PHYSICAL THERAPY INITIAL EVALUATION ADULT - PERTINENT HX OF CURRENT PROBLEM, REHAB EVAL
78 y/o male admitted on 5/19/22  Hx Bipolar disorder, anxiety and BPH, and recent cardiogenic shock 2/2 IW NSTEMI p/w relative hypotension with new LV Thrombus now on heparin to coumadin bridge

## 2022-05-23 NOTE — PHARMACOTHERAPY INTERVENTION NOTE - COMMENTS
Counseled patient on warfarin indication, monitoring for possible side effects (bleeding), drug/food interactions (reviewed foods containing Vitamin K and to maintain a steady consistent diet), and drug-drug interactions. Explained the meaning of INR, INR goal, importance of adherence and outpatient provider follow- up. Counseled patient on taking acetaminophen over the counter if needed and to avoid ibuprofen. Patient was provided with a medication card for their new medication. "Taking Warfarin Safely" Booklet provided. Patient questions and concerns were answered and addressed. Patient demonstrated understanding. Patient understood importance of compliance and to follow up with cardiologist once discharged.     Emilee Beltran, PharmD  PGY-1 Pharmacy Resident  [facility] #[id number]

## 2022-05-23 NOTE — PROGRESS NOTE ADULT - PROBLEM SELECTOR PLAN 1
TTE 5/12: EF 35 %, severe LV systolic dysfunction in setting of cardiogenic shock 2/2 IWSTEMI  [facility] showed 100% occlusion for right coronary artery and 70% occlusion of left anterior descending artery.  The arteries were not intervened upon due to viability study showing no viability in the areas supplied by the occluded arteries.  Home meds: Aspirin 81mg,Clopidogrel 75mg, Metoprolol succinate 150mg daily and Losartan 25mg daily (with plans to eventually switch to Entresto). Atorvastatin was started but then held given rise in LFTs.   Now presenting with OGLESBY, nausea, and relative hypotension  CXR shows new small b/l pleural effusions  pro-BNP 8357 (no baseline)  EKG NSR   s/p 20 IV lasix with 500 cc urine output    - Strict I/0  - Repeat TTE showing LV thrombus   - cards consulted, recs appreciated   - diurese with 20mg PO lasix daily  - continue ASA, hold Statin, low dose metoprolol   - hold losartan in setting of relative hypotension/ questionable BRODY HFrEF with CAD s/p recent STEMI  TTE 5/12: EF 35 %, severe LV systolic dysfunction in setting of cardiogenic shock 2/2 [facility] showed 100% occlusion for right coronary artery and 70% occlusion of left anterior descending artery.  The arteries were not intervened upon due to viability study showing no viability in the areas supplied by the occluded arteries.  Home meds: Aspirin 81mg,Clopidogrel 75mg, Metoprolol succinate 150mg daily and Losartan 25mg daily (with plans to eventually switch to Entresto). Atorvastatin was started but then held given rise in LFTs.   Now presenting with OGLESBY, nausea, and relative hypotension  CXR shows new small b/l pleural effusions  pro-BNP 8357 (no baseline)  EKG NSR   s/p 20 IV lasix with 500 cc urine output    - Strict I/Os, standing weights daily  - Repeat TTE showing LV thrombus   - cards consulted, recs appreciated   - c/w lasix 20mg PO daily  - c/w low dose metoprolol 25mg BID  - held losartan in setting of relative hypotension/ questionable BRODY  - start atorvastatin 80mg qhs (was stopped last admission due to elevated LFTs)  - c/w DAPT and AC for now, will eventually d/c ASA to avoid triple therapy (will clarify duration)  - appreciate Cardiology recs

## 2022-05-23 NOTE — PROGRESS NOTE ADULT - PROBLEM SELECTOR PLAN 2
- new LV thrombus seen on TTE on admission  - continue heparin bridge to warfarin  - dose 5mg warfarin 5/22 night  - continue to monitor INR - new LV thrombus seen on TTE on admission  - continue heparin bridge to warfarin  - dose 6mg warfarin 5/23 night  - continue to monitor INR

## 2022-05-23 NOTE — PROGRESS NOTE ADULT - PROBLEM SELECTOR PLAN 3
SBP 100s now Per hospital records were 110s-120s prior to DC  Could be 2/2 Losartan, continue to hold   - hold Losartan; low dose Metoprolol and increase as tolerated SBP 100s now; Per hospital records were 110s-120s prior to DC  Could be 2/2 Losartan  -holding Losartan  -c/w low dose Metoprolol 25mg BID and increase as tolerated

## 2022-05-23 NOTE — PHYSICAL THERAPY INITIAL EVALUATION ADULT - ADDITIONAL COMMENTS
lives w/ lives w/wife in private home in [place], 2 steps to enter w/ handrails, does not use assistive device, glasses all the time / hearing good, pt is [employer]en[employer]

## 2022-05-23 NOTE — PROVIDER CONTACT NOTE (CRITICAL VALUE NOTIFICATION) - ASSESSMENT
Lactate was 0.9 on 5/12, presently 2.2 on 5/21. NO s/s of infection noted.
patient Axo 4. on RA. on heparin drip nomogram. no signs of active bleeding. denies discomfort.
bleeding noted for iv insertion site. no s/s of distress. pressure dressing applied.
no s/s of distress bleeding noted from right ac iv insertion site

## 2022-05-23 NOTE — PROVIDER CONTACT NOTE (CRITICAL VALUE NOTIFICATION) - RECOMMENDATIONS
hold hep x 1hr then decrease to 11cc/hr
Notify MD.
follow nomogram decrease heparin gtt top 14cc/hr
provider notify, follow heparin nomogram

## 2022-05-24 ENCOUNTER — APPOINTMENT (OUTPATIENT)
Dept: CARDIOLOGY | Facility: CLINIC | Age: 77
End: 2022-05-24

## 2022-05-24 LAB
ALBUMIN SERPL ELPH-MCNC: 3.1 G/DL — LOW (ref 3.3–5)
ALP SERPL-CCNC: 167 U/L — HIGH (ref 40–120)
ALT FLD-CCNC: 46 U/L — HIGH (ref 10–45)
ANION GAP SERPL CALC-SCNC: 10 MMOL/L — SIGNIFICANT CHANGE UP (ref 5–17)
APTT BLD: 37.7 SEC — HIGH (ref 27.5–35.5)
APTT BLD: 90.5 SEC — HIGH (ref 27.5–35.5)
AST SERPL-CCNC: 33 U/L — SIGNIFICANT CHANGE UP (ref 10–40)
BASOPHILS # BLD AUTO: 0.06 K/UL — SIGNIFICANT CHANGE UP (ref 0–0.2)
BASOPHILS NFR BLD AUTO: 0.9 % — SIGNIFICANT CHANGE UP (ref 0–2)
BILIRUB SERPL-MCNC: 0.6 MG/DL — SIGNIFICANT CHANGE UP (ref 0.2–1.2)
BUN SERPL-MCNC: 19 MG/DL — SIGNIFICANT CHANGE UP (ref 7–23)
CALCIUM SERPL-MCNC: 9.1 MG/DL — SIGNIFICANT CHANGE UP (ref 8.4–10.5)
CHLORIDE SERPL-SCNC: 106 MMOL/L — SIGNIFICANT CHANGE UP (ref 96–108)
CO2 SERPL-SCNC: 23 MMOL/L — SIGNIFICANT CHANGE UP (ref 22–31)
CREAT SERPL-MCNC: 1.05 MG/DL — SIGNIFICANT CHANGE UP (ref 0.5–1.3)
EGFR: 73 ML/MIN/1.73M2 — SIGNIFICANT CHANGE UP
EOSINOPHIL # BLD AUTO: 0.29 K/UL — SIGNIFICANT CHANGE UP (ref 0–0.5)
EOSINOPHIL NFR BLD AUTO: 4.4 % — SIGNIFICANT CHANGE UP (ref 0–6)
GLUCOSE SERPL-MCNC: 108 MG/DL — HIGH (ref 70–99)
HCT VFR BLD CALC: 38.1 % — LOW (ref 39–50)
HGB BLD-MCNC: 12.2 G/DL — LOW (ref 13–17)
IMM GRANULOCYTES NFR BLD AUTO: 0.6 % — SIGNIFICANT CHANGE UP (ref 0–1.5)
INR BLD: 2.42 RATIO — HIGH (ref 0.88–1.16)
INR BLD: 3.05 RATIO — HIGH (ref 0.88–1.16)
LYMPHOCYTES # BLD AUTO: 2.24 K/UL — SIGNIFICANT CHANGE UP (ref 1–3.3)
LYMPHOCYTES # BLD AUTO: 34.3 % — SIGNIFICANT CHANGE UP (ref 13–44)
MAGNESIUM SERPL-MCNC: 2 MG/DL — SIGNIFICANT CHANGE UP (ref 1.6–2.6)
MCHC RBC-ENTMCNC: 30.9 PG — SIGNIFICANT CHANGE UP (ref 27–34)
MCHC RBC-ENTMCNC: 32 GM/DL — SIGNIFICANT CHANGE UP (ref 32–36)
MCV RBC AUTO: 96.5 FL — SIGNIFICANT CHANGE UP (ref 80–100)
MONOCYTES # BLD AUTO: 0.83 K/UL — SIGNIFICANT CHANGE UP (ref 0–0.9)
MONOCYTES NFR BLD AUTO: 12.7 % — SIGNIFICANT CHANGE UP (ref 2–14)
NEUTROPHILS # BLD AUTO: 3.07 K/UL — SIGNIFICANT CHANGE UP (ref 1.8–7.4)
NEUTROPHILS NFR BLD AUTO: 47.1 % — SIGNIFICANT CHANGE UP (ref 43–77)
NRBC # BLD: 0 /100 WBCS — SIGNIFICANT CHANGE UP (ref 0–0)
PHOSPHATE SERPL-MCNC: 3.3 MG/DL — SIGNIFICANT CHANGE UP (ref 2.5–4.5)
PLATELET # BLD AUTO: 264 K/UL — SIGNIFICANT CHANGE UP (ref 150–400)
POTASSIUM SERPL-MCNC: 4.7 MMOL/L — SIGNIFICANT CHANGE UP (ref 3.5–5.3)
POTASSIUM SERPL-SCNC: 4.7 MMOL/L — SIGNIFICANT CHANGE UP (ref 3.5–5.3)
PROT SERPL-MCNC: 6.3 G/DL — SIGNIFICANT CHANGE UP (ref 6–8.3)
PROTHROM AB SERPL-ACNC: 28.3 SEC — HIGH (ref 10.5–13.4)
PROTHROM AB SERPL-ACNC: 35.5 SEC — HIGH (ref 10.5–13.4)
RBC # BLD: 3.95 M/UL — LOW (ref 4.2–5.8)
RBC # FLD: 15.4 % — HIGH (ref 10.3–14.5)
SODIUM SERPL-SCNC: 139 MMOL/L — SIGNIFICANT CHANGE UP (ref 135–145)
WBC # BLD: 6.53 K/UL — SIGNIFICANT CHANGE UP (ref 3.8–10.5)
WBC # FLD AUTO: 6.53 K/UL — SIGNIFICANT CHANGE UP (ref 3.8–10.5)

## 2022-05-24 PROCEDURE — 99232 SBSQ HOSP IP/OBS MODERATE 35: CPT | Mod: GC

## 2022-05-24 PROCEDURE — 71045 X-RAY EXAM CHEST 1 VIEW: CPT | Mod: 26

## 2022-05-24 RX ORDER — WARFARIN SODIUM 2.5 MG/1
3 TABLET ORAL AT BEDTIME
Refills: 0 | Status: COMPLETED | OUTPATIENT
Start: 2022-05-24 | End: 2022-05-24

## 2022-05-24 RX ORDER — WARFARIN SODIUM 2.5 MG/1
5 TABLET ORAL AT BEDTIME
Refills: 0 | Status: DISCONTINUED | OUTPATIENT
Start: 2022-05-24 | End: 2022-05-24

## 2022-05-24 RX ORDER — METOPROLOL TARTRATE 50 MG
50 TABLET ORAL
Refills: 0 | Status: DISCONTINUED | OUTPATIENT
Start: 2022-05-24 | End: 2022-05-26

## 2022-05-24 RX ADMIN — Medication 25 MILLIGRAM(S): at 05:43

## 2022-05-24 RX ADMIN — Medication 0.5 MILLIGRAM(S): at 20:27

## 2022-05-24 RX ADMIN — WARFARIN SODIUM 3 MILLIGRAM(S): 2.5 TABLET ORAL at 21:41

## 2022-05-24 RX ADMIN — TAMSULOSIN HYDROCHLORIDE 0.4 MILLIGRAM(S): 0.4 CAPSULE ORAL at 21:40

## 2022-05-24 RX ADMIN — MIRTAZAPINE 15 MILLIGRAM(S): 45 TABLET, ORALLY DISINTEGRATING ORAL at 21:40

## 2022-05-24 RX ADMIN — Medication 20 MILLIGRAM(S): at 05:43

## 2022-05-24 RX ADMIN — CLOPIDOGREL BISULFATE 75 MILLIGRAM(S): 75 TABLET, FILM COATED ORAL at 11:46

## 2022-05-24 RX ADMIN — ATORVASTATIN CALCIUM 80 MILLIGRAM(S): 80 TABLET, FILM COATED ORAL at 21:40

## 2022-05-24 RX ADMIN — Medication 5 MILLIGRAM(S): at 21:39

## 2022-05-24 RX ADMIN — Medication 150 MILLIGRAM(S): at 21:42

## 2022-05-24 RX ADMIN — Medication 50 MILLIGRAM(S): at 17:14

## 2022-05-24 RX ADMIN — Medication 81 MILLIGRAM(S): at 11:46

## 2022-05-24 RX ADMIN — HEPARIN SODIUM 900 UNIT(S)/HR: 5000 INJECTION INTRAVENOUS; SUBCUTANEOUS at 08:35

## 2022-05-24 NOTE — PROVIDER CONTACT NOTE (OTHER) - RECOMMENDATIONS
Notify MD, CXR, repeat INR, give klonoPIN for anxiety relief
notify provider
Notify provider. Continue w/am labs? Monitor.

## 2022-05-24 NOTE — PROGRESS NOTE ADULT - PROBLEM SELECTOR PLAN 4
Present at previous hospitalization Likely 2/2 cardiogenic shock and/or hepatic congestion  - downtrending back to wnl  - Per Cardiology, restarted atorvastatin for CAD and recent IWSTEMI

## 2022-05-24 NOTE — PROVIDER CONTACT NOTE (OTHER) - SITUATION
Pt complains of SOB
13 beats of WCTs
Pt had 6 beats wide complex with HR up to 150s lasting 2.4 seconds.

## 2022-05-24 NOTE — PROVIDER CONTACT NOTE (OTHER) - ASSESSMENT
Pt asymptomatic, denies CP palpitation. /74 Hr75
Pt a&ox4. VSS- /64, HR 70. Pt denies chest pain or palpitations.
wife states pt has had increased intermittent dyspnea throughout day which was not made known to staff until now. Pt complains of increased dyspnea and SOB, vss 100/69 HR 67 T 97.8 RR 21 Sp02 97% RA   pt bridged from heparin to coumadin hep gtt turned off this am INR was 2.4  pt states anxiety may be playing some role but unsure.

## 2022-05-24 NOTE — PROGRESS NOTE ADULT - PROBLEM SELECTOR PLAN 7
DVT ppx: On heparin and coumadin bridge  Diet: DASH   Dispo: Home when INR stable and in goal range of 2-3 DVT ppx: on therapeutic warfarin dose; off hep gtt  Diet: DASH   Dispo: Home when INR stable and in goal range of 2-3

## 2022-05-24 NOTE — PROGRESS NOTE ADULT - PROBLEM SELECTOR PLAN 3
SBP 100s now; Per hospital records were 110s-120s prior to DC  Could be 2/2 Losartan  -holding Losartan  -c/w low dose Metoprolol 25mg BID and increase as tolerated SBP 100s now; Per hospital records were 110s-120s prior to DC  Could be 2/2 Losartan  -holding Losartan  -increased Metoprolol to 50mg BID; uptitrate as tolerated

## 2022-05-24 NOTE — PROGRESS NOTE ADULT - ASSESSMENT
77M Hx Bipolar disorder, anxiety and BPH, and recent cardiogenic shock 2/2 IW NSTEMI p/w relative hypotension with new LV Thrombus now on heparin to coumadin bridge   77M Hx Bipolar disorder, anxiety and BPH, and recent cardiogenic shock 2/2 IW NSTEMI p/w relative hypotension with new LV Thrombus, s/p heparin bridge  to coumadin, now awaiting stable INR

## 2022-05-24 NOTE — PROGRESS NOTE ADULT - PROBLEM SELECTOR PLAN 1
HFrEF with CAD s/p recent STEMI  TTE 5/12: EF 35 %, severe LV systolic dysfunction in setting of cardiogenic shock 2/2 [facility] showed 100% occlusion for right coronary artery and 70% occlusion of left anterior descending artery.  The arteries were not intervened upon due to viability study showing no viability in the areas supplied by the occluded arteries.  Home meds: Aspirin 81mg,Clopidogrel 75mg, Metoprolol succinate 150mg daily and Losartan 25mg daily (with plans to eventually switch to Entresto). Atorvastatin was started but then held given rise in LFTs.   Now presenting with OGLESBY, nausea, and relative hypotension  CXR shows new small b/l pleural effusions  pro-BNP 8357 (no baseline)  EKG NSR   s/p 20 IV lasix with 500 cc urine output    - Strict I/Os, standing weights daily  - Repeat TTE showing LV thrombus   - cards consulted, recs appreciated   - c/w lasix 20mg PO daily  - c/w low dose metoprolol 25mg BID  - held losartan in setting of relative hypotension/ questionable BRODY  - start atorvastatin 80mg qhs (was stopped last admission due to elevated LFTs)  - c/w DAPT and AC for now, will eventually d/c ASA to avoid triple therapy (will clarify duration)  - appreciate Cardiology recs HFrEF with CAD s/p recent STEMI  TTE 5/12: EF 35 %, severe LV systolic dysfunction in setting of cardiogenic shock 2/2 [facility] showed 100% occlusion for right coronary artery and 70% occlusion of left anterior descending artery.  The arteries were not intervened upon due to viability study showing no viability in the areas supplied by the occluded arteries.  Home meds: Aspirin 81mg,Clopidogrel 75mg, Metoprolol succinate 150mg daily and Losartan 25mg daily (with plans to eventually switch to Entresto). Atorvastatin was started but then held given rise in LFTs.   Now presenting with OGLESBY, nausea, and relative hypotension  CXR shows new small b/l pleural effusions  pro-BNP 8357 (no baseline)  EKG NSR   s/p 20 IV lasix with 500 cc urine output    - Strict I/Os, standing weights daily  - Repeat TTE showing LV thrombus   - cards consulted, recs appreciated   - c/w lasix 20mg PO daily  - increased metoprolol to 50mg BID  - held losartan in setting of relative hypotension/ questionable BRODY  - c/w atorvastatin 80mg qhs (was stopped last admission due to elevated LFTs which have now downtrended)  - c/w DAPT and AC for now, will eventually d/c ASA to avoid triple therapy (per cards, reasonable to stop ASA in 1 week for total of 3 weeks)  - appreciate Cardiology recs

## 2022-05-24 NOTE — PROGRESS NOTE ADULT - ATTENDING COMMENTS
77M Hx Bipolar disorder, anxiety and BPH, and recent cardiogenic shock 2/2 IW NSTEMI p/w relative hypotension with new LV Thrombus now on heparin to coumadin bridge    #ADHF: improving on diuretics. c/w Aspirin 81mg,Clopidogrel 75mg, Metoprolol, Losartan 25mg daily (with plans to eventually switch to Entresto). NSVT overnight, c/w GDMT, increased metoprolol to 50mg BID  #CAD s/p IWSTEMI c/w DAPT, c/w statin. plan for total of 3 weeks of triple therapy then plavix and coumadin.   #LV thrombus: c/w  INR=2.42, dose coumadin 3mg Po tonight per pharmacy. daily INR.   Dispo: likely tomorrow if INR ok.

## 2022-05-24 NOTE — PROGRESS NOTE ADULT - PROBLEM SELECTOR PLAN 5
SCr 1.35 while baseline 1.0-1.1 likely 2/2 new Losartan use  - Hold Losartan for now, c/w lasix 20 po qd   - Avoid nephrotoxic agents  - Now downtrending back to baseline

## 2022-05-24 NOTE — PROVIDER CONTACT NOTE (OTHER) - BACKGROUND
Pt AOx4, on heparin gtt for LV thrombus
Pt admitted for dyspnea w/recent cardiogenic shock. PMH CAD, BPH, anxiety.
pt admitted with CHF exac, chest pain, LV thrombus  pt has hx of anxiety

## 2022-05-24 NOTE — PROGRESS NOTE ADULT - PROBLEM SELECTOR PLAN 2
- new LV thrombus seen on TTE on admission  - continue heparin bridge to warfarin  - dose 6mg warfarin 5/23 night  - continue to monitor INR - new LV thrombus seen on TTE on admission  - s/p heparin bridge to warfarin  - INR 2.4 (therapeutic x1), will continue to monitor for stable INR and warfarin regimen  - dose 3mg warfarin 5/24 night

## 2022-05-24 NOTE — PROGRESS NOTE ADULT - SUBJECTIVE AND OBJECTIVE BOX
PROGRESS NOTE:   Authored by Dr. Ian Baldwin MD    Patient is a 77y old  Male who presents with a chief complaint of HF exercerbation (23 May 2022 07:17)      SUBJECTIVE / OVERNIGHT EVENTS:  - Overnight, no acute events. Had 13 beats of wide complex tachycardia on telemetry, was asymptomatic.  - Today, pt seen and examined at bedside in AM. Denied f/c/n/v, CP, SOB, abdominal pain, dysuria, hematuria, hematochezia, melena, diarrhea, constipation.    MEDICATIONS  (STANDING):  aspirin enteric coated 81 milliGRAM(s) Oral daily  atorvastatin 80 milliGRAM(s) Oral at bedtime  clopidogrel Tablet 75 milliGRAM(s) Oral daily  furosemide    Tablet 20 milliGRAM(s) Oral daily  heparin  Infusion.  Unit(s)/Hr (16 mL/Hr) IV Continuous <Continuous>  melatonin 5 milliGRAM(s) Oral at bedtime  metoprolol tartrate 25 milliGRAM(s) Oral two times a day  mirtazapine 15 milliGRAM(s) Oral at bedtime  tamsulosin 0.4 milliGRAM(s) Oral at bedtime  venlafaxine XR. 150 milliGRAM(s) Oral every 24 hours    MEDICATIONS  (PRN):  acetaminophen     Tablet .. 650 milliGRAM(s) Oral every 6 hours PRN Temp greater or equal to 38C (100.4F), Mild Pain (1 - 3)  clonazePAM  Tablet 0.5 milliGRAM(s) Oral every 12 hours PRN anxiety  heparin   Injectable 7000 Unit(s) IV Push every 6 hours PRN For aPTT less than 40  heparin   Injectable 3500 Unit(s) IV Push every 6 hours PRN For aPTT between 40 - 57      CAPILLARY BLOOD GLUCOSE        I&O's Summary    23 May 2022 07:01  -  24 May 2022 07:00  --------------------------------------------------------  IN: 708 mL / OUT: 0 mL / NET: 708 mL        PHYSICAL EXAM:  Vital Signs Last 24 Hrs  T(C): 36.5 (23 May 2022 20:25), Max: 36.6 (23 May 2022 13:02)  T(F): 97.7 (23 May 2022 20:25), Max: 97.8 (23 May 2022 13:02)  HR: 74 (24 May 2022 04:13) (70 - 83)  BP: 125/74 (24 May 2022 04:13) (104/70 - 125/74)  BP(mean): --  RR: 18 (24 May 2022 04:13) (18 - 18)  SpO2: 99% (24 May 2022 04:13) (97% - 99%)    GENERAL: NAD, well-developed  HEENT: sclera clear  CHEST/LUNG: Clear to auscultation bilaterally; No wheezes or rales  HEART: Regular rate and rhythm; No murmurs, rubs, or gallops  ABDOMEN: Soft, Nontender, Nondistended; Bowel sounds present  EXTREMITIES:  2+ Peripheral Pulses, No clubbing or cyanosis; trace pitting edema in b/l LEs  PSYCH: AAOx3  NEUROLOGY: non-focal  SKIN: No rashes or lesions    LABS:                        12.2   6.61  )-----------( 245      ( 23 May 2022 07:35 )             38.0     05-23    139  |  105  |  20  ----------------------------<  105<H>  4.1   |  23  |  1.18    Ca    9.0      23 May 2022 07:35  Phos  3.6     05-23  Mg     2.1     05-23    TPro  6.4  /  Alb  3.1<L>  /  TBili  0.6  /  DBili  x   /  AST  37  /  ALT  48<H>  /  AlkPhos  181<H>  05-23    PT/INR - ( 23 May 2022 07:35 )   PT: 20.7 sec;   INR: 1.77 ratio         PTT - ( 23 May 2022 22:43 )  PTT:68.2 sec            RADIOLOGY & ADDITIONAL TESTS:  No new imaging   PROGRESS NOTE:   Authored by Dr. Ian Baldwin MD    Patient is a 77y old  Male who presents with a chief complaint of HF exercerbation (23 May 2022 07:17)      SUBJECTIVE / OVERNIGHT EVENTS:  - Overnight, no acute events. Had 13 beats of wide complex tachycardia on telemetry, was asymptomatic.  - Today, pt seen and examined at bedside in AM. Pt reported feeling well overall. Stated he had a moment of feeling anxious last night, which he noted he has from time to time. Denied f/c/n/v, CP, SOB, abdominal pain, dysuria, hematuria, hematochezia, melena, diarrhea, constipation.    MEDICATIONS  (STANDING):  aspirin enteric coated 81 milliGRAM(s) Oral daily  atorvastatin 80 milliGRAM(s) Oral at bedtime  clopidogrel Tablet 75 milliGRAM(s) Oral daily  furosemide    Tablet 20 milliGRAM(s) Oral daily  heparin  Infusion.  Unit(s)/Hr (16 mL/Hr) IV Continuous <Continuous>  melatonin 5 milliGRAM(s) Oral at bedtime  metoprolol tartrate 25 milliGRAM(s) Oral two times a day  mirtazapine 15 milliGRAM(s) Oral at bedtime  tamsulosin 0.4 milliGRAM(s) Oral at bedtime  venlafaxine XR. 150 milliGRAM(s) Oral every 24 hours    MEDICATIONS  (PRN):  acetaminophen     Tablet .. 650 milliGRAM(s) Oral every 6 hours PRN Temp greater or equal to 38C (100.4F), Mild Pain (1 - 3)  clonazePAM  Tablet 0.5 milliGRAM(s) Oral every 12 hours PRN anxiety  heparin   Injectable 7000 Unit(s) IV Push every 6 hours PRN For aPTT less than 40  heparin   Injectable 3500 Unit(s) IV Push every 6 hours PRN For aPTT between 40 - 57      CAPILLARY BLOOD GLUCOSE        I&O's Summary    23 May 2022 07:01  -  24 May 2022 07:00  --------------------------------------------------------  IN: 708 mL / OUT: 0 mL / NET: 708 mL        PHYSICAL EXAM:  Vital Signs Last 24 Hrs  T(C): 36.5 (23 May 2022 20:25), Max: 36.6 (23 May 2022 13:02)  T(F): 97.7 (23 May 2022 20:25), Max: 97.8 (23 May 2022 13:02)  HR: 74 (24 May 2022 04:13) (70 - 83)  BP: 125/74 (24 May 2022 04:13) (104/70 - 125/74)  BP(mean): --  RR: 18 (24 May 2022 04:13) (18 - 18)  SpO2: 99% (24 May 2022 04:13) (97% - 99%)    GENERAL: NAD, well-developed  HEENT: sclera clear  CHEST/LUNG: Clear to auscultation bilaterally; No wheezes or rales  HEART: Regular rate and rhythm; No murmurs, rubs, or gallops  ABDOMEN: Soft, Nontender, Nondistended; Bowel sounds present  EXTREMITIES:  2+ Peripheral Pulses, No clubbing or cyanosis; trace pitting edema in b/l LEs  PSYCH: AAOx3  NEUROLOGY: non-focal  SKIN: No rashes or lesions    LABS:                        12.2   6.61  )-----------( 245      ( 23 May 2022 07:35 )             38.0     05-23    139  |  105  |  20  ----------------------------<  105<H>  4.1   |  23  |  1.18    Ca    9.0      23 May 2022 07:35  Phos  3.6     05-23  Mg     2.1     05-23    TPro  6.4  /  Alb  3.1<L>  /  TBili  0.6  /  DBili  x   /  AST  37  /  ALT  48<H>  /  AlkPhos  181<H>  05-23    PT/INR - ( 23 May 2022 07:35 )   PT: 20.7 sec;   INR: 1.77 ratio         PTT - ( 23 May 2022 22:43 )  PTT:68.2 sec            RADIOLOGY & ADDITIONAL TESTS:  No new imaging

## 2022-05-24 NOTE — PROVIDER CONTACT NOTE (OTHER) - ACTION/TREATMENT ORDERED:
MD aware, CXR and INR repeat to confirm, night RN made aware of all
continue to monitor
MD Gatica of [facility] notified. Provider aware and will let day team know.

## 2022-05-24 NOTE — PHARMACOTHERAPY INTERVENTION NOTE - COMMENTS
Patient has been receiving warfarin 5 mg daily in the hospital with an increase to 6 mg last night. His INR increased by 0.65 from 1.77 to 2.42. Recommend decreasing the dose to 3 mg for tonight to avoid rapid increase to supratherapeutic INR.    Emilee Beltran, PharmD  PGY-1 Pharmacy Resident  [facility], [place] #[id number] Patient has been receiving warfarin 5 mg daily in the hospital with an increase to 6 mg last night for heparin drip bridge to warfarin. His INR increased by 0.65 from 1.77 to 2.42. Recommend decreasing the dose to 3 mg for tonight to avoid rapid increase to supratherapeutic INR.    Emilee Beltran, PharmD  PGY-1 Pharmacy Resident  [facility] #[id number]

## 2022-05-25 LAB
ALBUMIN SERPL ELPH-MCNC: 3.4 G/DL — SIGNIFICANT CHANGE UP (ref 3.3–5)
ALP SERPL-CCNC: 182 U/L — HIGH (ref 40–120)
ALT FLD-CCNC: 48 U/L — HIGH (ref 10–45)
ANION GAP SERPL CALC-SCNC: 12 MMOL/L — SIGNIFICANT CHANGE UP (ref 5–17)
APTT BLD: 38.9 SEC — HIGH (ref 27.5–35.5)
AST SERPL-CCNC: 36 U/L — SIGNIFICANT CHANGE UP (ref 10–40)
BILIRUB SERPL-MCNC: 0.7 MG/DL — SIGNIFICANT CHANGE UP (ref 0.2–1.2)
BUN SERPL-MCNC: 24 MG/DL — HIGH (ref 7–23)
CALCIUM SERPL-MCNC: 9.2 MG/DL — SIGNIFICANT CHANGE UP (ref 8.4–10.5)
CHLORIDE SERPL-SCNC: 104 MMOL/L — SIGNIFICANT CHANGE UP (ref 96–108)
CO2 SERPL-SCNC: 21 MMOL/L — LOW (ref 22–31)
CREAT SERPL-MCNC: 1.15 MG/DL — SIGNIFICANT CHANGE UP (ref 0.5–1.3)
EGFR: 66 ML/MIN/1.73M2 — SIGNIFICANT CHANGE UP
FACT X AG PPP IA-ACNC: 52 % — LOW
GLUCOSE SERPL-MCNC: 101 MG/DL — HIGH (ref 70–99)
HCT VFR BLD CALC: 37.2 % — LOW (ref 39–50)
HGB BLD-MCNC: 12 G/DL — LOW (ref 13–17)
INR BLD: 2.89 RATIO — HIGH (ref 0.88–1.16)
MAGNESIUM SERPL-MCNC: 2 MG/DL — SIGNIFICANT CHANGE UP (ref 1.6–2.6)
MCHC RBC-ENTMCNC: 31 PG — SIGNIFICANT CHANGE UP (ref 27–34)
MCHC RBC-ENTMCNC: 32.3 GM/DL — SIGNIFICANT CHANGE UP (ref 32–36)
MCV RBC AUTO: 96.1 FL — SIGNIFICANT CHANGE UP (ref 80–100)
NRBC # BLD: 0 /100 WBCS — SIGNIFICANT CHANGE UP (ref 0–0)
PHOSPHATE SERPL-MCNC: 3.5 MG/DL — SIGNIFICANT CHANGE UP (ref 2.5–4.5)
PLATELET # BLD AUTO: 246 K/UL — SIGNIFICANT CHANGE UP (ref 150–400)
POTASSIUM SERPL-MCNC: 4.4 MMOL/L — SIGNIFICANT CHANGE UP (ref 3.5–5.3)
POTASSIUM SERPL-SCNC: 4.4 MMOL/L — SIGNIFICANT CHANGE UP (ref 3.5–5.3)
PROT SERPL-MCNC: 6.4 G/DL — SIGNIFICANT CHANGE UP (ref 6–8.3)
PROTHROM AB SERPL-ACNC: 33.6 SEC — HIGH (ref 10.5–13.4)
RBC # BLD: 3.87 M/UL — LOW (ref 4.2–5.8)
RBC # FLD: 15.5 % — HIGH (ref 10.3–14.5)
SODIUM SERPL-SCNC: 137 MMOL/L — SIGNIFICANT CHANGE UP (ref 135–145)
WBC # BLD: 6.92 K/UL — SIGNIFICANT CHANGE UP (ref 3.8–10.5)
WBC # FLD AUTO: 6.92 K/UL — SIGNIFICANT CHANGE UP (ref 3.8–10.5)

## 2022-05-25 PROCEDURE — 99232 SBSQ HOSP IP/OBS MODERATE 35: CPT | Mod: GC

## 2022-05-25 RX ORDER — FUROSEMIDE 40 MG
40 TABLET ORAL
Refills: 0 | Status: DISCONTINUED | OUTPATIENT
Start: 2022-05-25 | End: 2022-05-26

## 2022-05-25 RX ORDER — WARFARIN SODIUM 2.5 MG/1
5 TABLET ORAL AT BEDTIME
Refills: 0 | Status: COMPLETED | OUTPATIENT
Start: 2022-05-25 | End: 2022-05-25

## 2022-05-25 RX ADMIN — Medication 81 MILLIGRAM(S): at 12:10

## 2022-05-25 RX ADMIN — WARFARIN SODIUM 5 MILLIGRAM(S): 2.5 TABLET ORAL at 21:27

## 2022-05-25 RX ADMIN — Medication 5 MILLIGRAM(S): at 21:26

## 2022-05-25 RX ADMIN — ATORVASTATIN CALCIUM 80 MILLIGRAM(S): 80 TABLET, FILM COATED ORAL at 21:27

## 2022-05-25 RX ADMIN — MIRTAZAPINE 15 MILLIGRAM(S): 45 TABLET, ORALLY DISINTEGRATING ORAL at 21:26

## 2022-05-25 RX ADMIN — Medication 150 MILLIGRAM(S): at 21:28

## 2022-05-25 RX ADMIN — Medication 20 MILLIGRAM(S): at 05:06

## 2022-05-25 RX ADMIN — Medication 40 MILLIGRAM(S): at 13:43

## 2022-05-25 RX ADMIN — TAMSULOSIN HYDROCHLORIDE 0.4 MILLIGRAM(S): 0.4 CAPSULE ORAL at 21:26

## 2022-05-25 RX ADMIN — Medication 50 MILLIGRAM(S): at 05:06

## 2022-05-25 RX ADMIN — CLOPIDOGREL BISULFATE 75 MILLIGRAM(S): 75 TABLET, FILM COATED ORAL at 12:11

## 2022-05-25 RX ADMIN — Medication 0.5 MILLIGRAM(S): at 12:35

## 2022-05-25 RX ADMIN — Medication 50 MILLIGRAM(S): at 17:23

## 2022-05-25 NOTE — PROGRESS NOTE ADULT - PROBLEM SELECTOR PLAN 2
- new LV thrombus seen on TTE on admission  - s/p heparin bridge to warfarin  - INR 2.4 (therapeutic x1), will continue to monitor for stable INR and warfarin regimen  - dose 3mg warfarin 5/24 night

## 2022-05-25 NOTE — PROGRESS NOTE ADULT - PROBLEM SELECTOR PLAN 3
SBP 100s now; Per hospital records were 110s-120s prior to DC  Could be 2/2 Losartan  - holding Losartan  - c/w Metoprolol 50mg BID; uptitrate as tolerated SBP 100s now; Per hospital records were 110s-120s prior to DC  Could be 2/2 Losartan  - holding Losartan  - c/w Lasix as above with hold parameters  - c/w Metoprolol 50mg BID; uptitrate as tolerated

## 2022-05-25 NOTE — PROGRESS NOTE ADULT - PROBLEM SELECTOR PLAN 1
HFrEF with CAD s/p recent STEMI  TTE 5/12: EF 35 %, severe LV systolic dysfunction in setting of cardiogenic shock 2/2 [facility] showed 100% occlusion for right coronary artery and 70% occlusion of left anterior descending artery.  The arteries were not intervened upon due to viability study showing no viability in the areas supplied by the occluded arteries.  Home meds: Aspirin 81mg,Clopidogrel 75mg, Metoprolol succinate 150mg daily and Losartan 25mg daily (with plans to eventually switch to Entresto). Atorvastatin was started but then held given rise in LFTs.   Now presenting with OGLESBY, nausea, and relative hypotension  CXR shows new small b/l pleural effusions  pro-BNP 8357 (no baseline)  EKG NSR   s/p 20 IV lasix with 500 cc urine output    - Strict I/Os, standing weights daily  - Repeat TTE showing LV thrombus   - cards consulted, recs appreciated   - c/w lasix 20mg PO daily  - c/w metoprolol 50mg BID  - held losartan in setting of relative hypotension/ questionable BRODY  - c/w atorvastatin 80mg qhs (was stopped last admission due to elevated LFTs which have now downtrended)  - c/w DAPT and AC for now, will eventually d/c ASA to avoid triple therapy (per cards, reasonable to stop ASA in 1 week on 5/31 for total of 3 weeks on ASA)  - appreciate Cardiology recs HFrEF with CAD s/p recent STEMI  TTE 5/12: EF 35 %, severe LV systolic dysfunction in setting of cardiogenic shock 2/2 [facility] showed 100% occlusion for right coronary artery and 70% occlusion of left anterior descending artery.  The arteries were not intervened upon due to viability study showing no viability in the areas supplied by the occluded arteries.  Home meds: Aspirin 81mg,Clopidogrel 75mg, Metoprolol succinate 150mg daily and Losartan 25mg daily (with plans to eventually switch to Entresto). Atorvastatin was started but then held given rise in LFTs.   Now presenting with OGLESBY, nausea, and relative hypotension  CXR shows new small b/l pleural effusions  pro-BNP 8357 (no baseline)  EKG NSR   s/p 20 IV lasix with 500 cc urine output    - Strict I/Os, standing weights daily  - Repeat TTE showing LV thrombus   - cards consulted, recs appreciated   - will increased Lasix 20mg PO daily to 40mg PO BID for now as BP tolerates; will continue to reassess  - c/w metoprolol 50mg BID  - held losartan in setting of relative hypotension/ questionable BRODY  - c/w atorvastatin 80mg qhs (was stopped last admission due to elevated LFTs which have now downtrended)  - c/w DAPT and AC for now, will eventually d/c ASA to avoid triple therapy (per cards, reasonable to stop ASA in 1 week on 5/31 for total of 3 weeks on ASA)  - appreciate Cardiology recs HFrEF with CAD s/p recent STEMI  TTE 5/12: EF 35 %, severe LV systolic dysfunction in setting of cardiogenic shock 2/2 [facility] showed 100% occlusion for right coronary artery and 70% occlusion of left anterior descending artery.  The arteries were not intervened upon due to viability study showing no viability in the areas supplied by the occluded arteries.  Home meds: Aspirin 81mg,Clopidogrel 75mg, Metoprolol succinate 150mg daily and Losartan 25mg daily (with plans to eventually switch to Entresto). Atorvastatin was started but then held given rise in LFTs.   Now presenting with OGLESBY, nausea, and relative hypotension  CXR shows new small b/l pleural effusions  pro-BNP 8357 (no baseline)  EKG NSR   s/p 20 IV lasix with 500 cc urine output    - Strict I/Os, standing weights daily  - Repeat TTE showing LV thrombus   - cards consulted, recs appreciated   - held losartan in setting of relative hypotension/ questionable BRODY  - c/w atorvastatin 80mg qhs (was stopped last admission due to elevated LFTs which have now downtrended)  - c/w DAPT and AC for now, will eventually d/c ASA to avoid triple therapy (per cards, reasonable to stop ASA in 1 week on 5/31 for total of 3 weeks on ASA)  - c/w metoprolol 50mg BID  - will increased Lasix 20mg PO daily to 40mg PO BID for now as BP tolerates; will continue to reassess  - appreciate Cardiology recs

## 2022-05-25 NOTE — GOALS OF CARE CONVERSATION - ADVANCED CARE PLANNING - CONVERSATION DETAILS
Discussed with pt (John Becerra) and pt's wife (Kenia Becerra) at bedside to clarify pt's goals of care given his recent MI c/b HFrEF (EF: 30%) and LV thrombus. Discussed how cath from last visit showed 100% occlusion in RCA and 70% occlusion in LAD but viability study showed no viability in areas supplied by occluded arteries and so stenting was not pursued, and instead was managed medically. Pt eventually placed on DAPT and AC (triple therapy) for finding of LV thrombus shortly after MI. Noted elevated risk of bleeding and plan to discontinue ASA after ~3 weeks of triple therapy (to stop ASA end of the month). Discussed reasons for his OGLESBY and plan to optimize his respiratory and fluid status with diuretic medication. Pt and wife voiced understanding of situation. Wife noted pt was very functional prior to this heart attack. Wife stated pt was doing everything right including staying active and eating healthy, but unfortunately this event happened out of nowhere. In discussion of goals of care, pt and wife mentioned goal is to get better. Wife emphasized that she does not want pt to push himself too much and wanted him to take things slowly in his recovery. If worst comes to worst and his heart was to stop and CPR needed to be performed and/or if his breathing worsened and needed to be put on a ventilator machine to help him breathe, pt and wife stated to do everything needed to keep pt alive.     At this time, confirmed with pt and wife that code status is FULL CODE.

## 2022-05-25 NOTE — PROGRESS NOTE ADULT - ASSESSMENT
77M Hx Bipolar disorder, anxiety and BPH, and recent cardiogenic shock 2/2 IW NSTEMI p/w relative hypotension with new LV Thrombus, s/p heparin bridge  to coumadin, now awaiting stable INR   77M Hx Bipolar disorder, anxiety and BPH, and recent cardiogenic shock 2/2 IW NSTEMI p/w relative hypotension with new LV Thrombus, s/p heparin bridge to coumadin, now awaiting stable INR and optimization of CHF

## 2022-05-25 NOTE — PROGRESS NOTE ADULT - PROBLEM SELECTOR PLAN 5
SCr 1.35 while baseline 1.0-1.1 likely 2/2 new Losartan use  - Hold Losartan for now, c/w lasix 20 po qd   - Avoid nephrotoxic agents  - Now downtrending back to baseline SCr 1.35 while baseline 1.0-1.1 likely 2/2 new Losartan use  - Hold Losartan for now  - c/w Lasix as above  - Avoid nephrotoxic agents  - Now downtrending back to baseline

## 2022-05-25 NOTE — PROGRESS NOTE ADULT - PROBLEM SELECTOR PLAN 7
DVT ppx: on therapeutic warfarin dose; off hep gtt  Diet: DASH   Dispo: Home when INR stable and in goal range of 2-3 DVT ppx: on therapeutic warfarin dose; off hep gtt  Diet: DASH   Dispo: Home when INR stable in goal range of 2-3 and CHF optimized

## 2022-05-25 NOTE — PROGRESS NOTE ADULT - ATTENDING COMMENTS
77M Hx Bipolar disorder, anxiety and BPH, and recent cardiogenic shock 2/2 IW NSTEMI p/w relative hypotension with new LV Thrombus now on heparin to coumadin bridge    #ADHF: Increased OGLESBY today and now reports a few days fo orthopnea. Will increased lasix to 40mg PO BID (after discussion with patient, but if further decompensates or not improved, will have to use IV lasix). monitor elytes. c/w Aspirin 81mg,Clopidogrel 75mg, Metoprolol, Losartan 25mg daily (with plans to eventually switch to Entresto). c/w GDMT  #CAD s/p IWSTEMI c/w DAPT, c/w statin. plan for total of 3 weeks of triple therapy (6/1) then plavix and coumadin.   #LV thrombus: c/w  INR=2.89, dose coumadin 5mg Po tonight. daily INR. Pt ot f/u wit the med station (his PCP after discharge).  Dispo: likely tomorrow if OGLESBY improved.

## 2022-05-25 NOTE — PROGRESS NOTE ADULT - SUBJECTIVE AND OBJECTIVE BOX
PROGRESS NOTE:   Authored by Dr. Ian Baldwin MD    Patient is a 77y old  Male who presents with a chief complaint of HF exercerbation (24 May 2022 07:34)      SUBJECTIVE / OVERNIGHT EVENTS:  - Overnight, pt with reports of slightly increased dyspnea; VS stable and pt noting that anxiety could have played some role but was unsure as hep gtt was turned off yesterday. CXR appeared largely unchanged/improved compared to prior. INR repeated to ensure therapeutic (was 3.05). Pt's home clonazepam was given.  - Today, pt seen and examined at bedside in AM. Denied f/c/n/v, CP, SOB, abdominal pain, dysuria, hematuria, hematochezia, melena, diarrhea, constipation.      MEDICATIONS  (STANDING):  aspirin enteric coated 81 milliGRAM(s) Oral daily  atorvastatin 80 milliGRAM(s) Oral at bedtime  clopidogrel Tablet 75 milliGRAM(s) Oral daily  furosemide    Tablet 20 milliGRAM(s) Oral daily  melatonin 5 milliGRAM(s) Oral at bedtime  metoprolol tartrate 50 milliGRAM(s) Oral two times a day  mirtazapine 15 milliGRAM(s) Oral at bedtime  tamsulosin 0.4 milliGRAM(s) Oral at bedtime  venlafaxine XR. 150 milliGRAM(s) Oral every 24 hours    MEDICATIONS  (PRN):  acetaminophen     Tablet .. 650 milliGRAM(s) Oral every 6 hours PRN Temp greater or equal to 38C (100.4F), Mild Pain (1 - 3)  clonazePAM  Tablet 0.5 milliGRAM(s) Oral every 12 hours PRN anxiety      CAPILLARY BLOOD GLUCOSE        I&O's Summary    24 May 2022 07:01  -  25 May 2022 07:00  --------------------------------------------------------  IN: 240 mL / OUT: 0 mL / NET: 240 mL        PHYSICAL EXAM:  Vital Signs Last 24 Hrs  T(C): 36.3 (25 May 2022 04:34), Max: 36.6 (24 May 2022 12:35)  T(F): 97.4 (25 May 2022 04:34), Max: 97.8 (24 May 2022 12:35)  HR: 75 (25 May 2022 04:34) (67 - 80)  BP: 113/61 (25 May 2022 04:34) (100/69 - 124/78)  BP(mean): --  RR: 20 (25 May 2022 04:34) (18 - 21)  SpO2: 96% (25 May 2022 04:34) (96% - 98%)    GENERAL: NAD, well-developed  HEENT: sclera clear  CHEST/LUNG: Clear to auscultation bilaterally; No wheezes or rales  HEART: Regular rate and rhythm; No murmurs, rubs, or gallops  ABDOMEN: Soft, Nontender, Nondistended; Bowel sounds present  EXTREMITIES:  2+ Peripheral Pulses, No clubbing or cyanosis; trace pitting edema in b/l LEs  PSYCH: AAOx3  NEUROLOGY: non-focal  SKIN: No rashes or lesions    LABS:                        12.0   6.92  )-----------( 246      ( 25 May 2022 06:15 )             37.2     05-25    137  |  104  |  24<H>  ----------------------------<  101<H>  4.4   |  21<L>  |  1.15    Ca    9.2      25 May 2022 06:15  Phos  3.5     05-25  Mg     2.0     05-25    TPro  6.4  /  Alb  3.4  /  TBili  0.7  /  DBili  x   /  AST  36  /  ALT  48<H>  /  AlkPhos  182<H>  05-25    PT/INR - ( 25 May 2022 06:15 )   PT: 33.6 sec;   INR: 2.89 ratio         PTT - ( 25 May 2022 06:15 )  PTT:38.9 sec            RADIOLOGY & ADDITIONAL TESTS:  Results Reviewed:   Imaging Personally Reviewed:  Electrocardiogram Personally Reviewed:    COORDINATION OF CARE:  Care Discussed with Consultants/Other Providers [Y/N]:  Prior or Outpatient Records Reviewed [Y/N]:   PROGRESS NOTE:   Authored by Dr. Ian Baldwin MD    Patient is a 77y old  Male who presents with a chief complaint of HF exercerbation (24 May 2022 07:34)      SUBJECTIVE / OVERNIGHT EVENTS:  - Overnight, pt with reports of slightly increased dyspnea; VS stable and pt noting that anxiety could have played some role but was unsure as hep gtt was turned off yesterday. CXR appeared largely unchanged/improved compared to prior. INR repeated to ensure therapeutic (was 3.05). Pt's home clonazepam was given.  - Today, pt seen and examined at bedside in AM. Pt reported feeling well. Denied f/c/n/v, CP, SOB, abdominal pain, dysuria, hematuria, hematochezia, melena, diarrhea, constipation.  - Later in day, with pt's wife at bedside, noted increased dyspnea on exertion after short walk around unit; also endorsed orthopnea.       MEDICATIONS  (STANDING):  aspirin enteric coated 81 milliGRAM(s) Oral daily  atorvastatin 80 milliGRAM(s) Oral at bedtime  clopidogrel Tablet 75 milliGRAM(s) Oral daily  furosemide    Tablet 20 milliGRAM(s) Oral daily  melatonin 5 milliGRAM(s) Oral at bedtime  metoprolol tartrate 50 milliGRAM(s) Oral two times a day  mirtazapine 15 milliGRAM(s) Oral at bedtime  tamsulosin 0.4 milliGRAM(s) Oral at bedtime  venlafaxine XR. 150 milliGRAM(s) Oral every 24 hours    MEDICATIONS  (PRN):  acetaminophen     Tablet .. 650 milliGRAM(s) Oral every 6 hours PRN Temp greater or equal to 38C (100.4F), Mild Pain (1 - 3)  clonazePAM  Tablet 0.5 milliGRAM(s) Oral every 12 hours PRN anxiety      CAPILLARY BLOOD GLUCOSE        I&O's Summary    24 May 2022 07:01  -  25 May 2022 07:00  --------------------------------------------------------  IN: 240 mL / OUT: 0 mL / NET: 240 mL        PHYSICAL EXAM:  Vital Signs Last 24 Hrs  T(C): 36.3 (25 May 2022 04:34), Max: 36.6 (24 May 2022 12:35)  T(F): 97.4 (25 May 2022 04:34), Max: 97.8 (24 May 2022 12:35)  HR: 75 (25 May 2022 04:34) (67 - 80)  BP: 113/61 (25 May 2022 04:34) (100/69 - 124/78)  BP(mean): --  RR: 20 (25 May 2022 04:34) (18 - 21)  SpO2: 96% (25 May 2022 04:34) (96% - 98%)    GENERAL: NAD, well-developed  HEENT: sclera clear  CHEST/LUNG: Slightly decreased breath sounds on auscultation of left base, otherwise clear to auscultation bilaterally; No wheezes or rales  HEART: Regular rate and rhythm; No murmurs, rubs, or gallops  ABDOMEN: Soft, Nontender, Nondistended; Bowel sounds present  EXTREMITIES:  2+ Peripheral Pulses, No clubbing or cyanosis; trace pitting edema in b/l LEs  PSYCH: AAOx3  NEUROLOGY: non-focal  SKIN: No rashes or lesions    LABS:                        12.0   6.92  )-----------( 246      ( 25 May 2022 06:15 )             37.2     05-25    137  |  104  |  24<H>  ----------------------------<  101<H>  4.4   |  21<L>  |  1.15    Ca    9.2      25 May 2022 06:15  Phos  3.5     05-25  Mg     2.0     05-25    TPro  6.4  /  Alb  3.4  /  TBili  0.7  /  DBili  x   /  AST  36  /  ALT  48<H>  /  AlkPhos  182<H>  05-25    PT/INR - ( 25 May 2022 06:15 )   PT: 33.6 sec;   INR: 2.89 ratio         PTT - ( 25 May 2022 06:15 )  PTT:38.9 sec            RADIOLOGY & ADDITIONAL TESTS:  < from: Xray Chest 1 View AP/PA (05.24.22 @ 21:48) >  IMPRESSION:    Redemonstration small bilateral pleural effusions with adjacent passive   atelectasis.  Increased patchy opacity at the left costophrenic angle likely   representing atelectasis and/or developing pneumonia.  < end of copied text >

## 2022-05-26 ENCOUNTER — TRANSCRIPTION ENCOUNTER (OUTPATIENT)
Age: 77
End: 2022-05-26

## 2022-05-26 VITALS
RESPIRATION RATE: 18 BRPM | OXYGEN SATURATION: 98 % | SYSTOLIC BLOOD PRESSURE: 117 MMHG | HEART RATE: 88 BPM | DIASTOLIC BLOOD PRESSURE: 77 MMHG

## 2022-05-26 LAB
ALBUMIN SERPL ELPH-MCNC: 3.3 G/DL — SIGNIFICANT CHANGE UP (ref 3.3–5)
ALP SERPL-CCNC: 182 U/L — HIGH (ref 40–120)
ALT FLD-CCNC: 46 U/L — HIGH (ref 10–45)
ANION GAP SERPL CALC-SCNC: 12 MMOL/L — SIGNIFICANT CHANGE UP (ref 5–17)
APTT BLD: 38.6 SEC — HIGH (ref 27.5–35.5)
AST SERPL-CCNC: 35 U/L — SIGNIFICANT CHANGE UP (ref 10–40)
BASOPHILS # BLD AUTO: 0.03 K/UL — SIGNIFICANT CHANGE UP (ref 0–0.2)
BASOPHILS NFR BLD AUTO: 0.5 % — SIGNIFICANT CHANGE UP (ref 0–2)
BILIRUB SERPL-MCNC: 0.7 MG/DL — SIGNIFICANT CHANGE UP (ref 0.2–1.2)
BUN SERPL-MCNC: 26 MG/DL — HIGH (ref 7–23)
CALCIUM SERPL-MCNC: 9.1 MG/DL — SIGNIFICANT CHANGE UP (ref 8.4–10.5)
CHLORIDE SERPL-SCNC: 105 MMOL/L — SIGNIFICANT CHANGE UP (ref 96–108)
CO2 SERPL-SCNC: 21 MMOL/L — LOW (ref 22–31)
CREAT SERPL-MCNC: 1.13 MG/DL — SIGNIFICANT CHANGE UP (ref 0.5–1.3)
EGFR: 67 ML/MIN/1.73M2 — SIGNIFICANT CHANGE UP
EOSINOPHIL # BLD AUTO: 0.2 K/UL — SIGNIFICANT CHANGE UP (ref 0–0.5)
EOSINOPHIL NFR BLD AUTO: 3 % — SIGNIFICANT CHANGE UP (ref 0–6)
GLUCOSE SERPL-MCNC: 107 MG/DL — HIGH (ref 70–99)
HCT VFR BLD CALC: 36.7 % — LOW (ref 39–50)
HGB BLD-MCNC: 12.1 G/DL — LOW (ref 13–17)
IMM GRANULOCYTES NFR BLD AUTO: 0.5 % — SIGNIFICANT CHANGE UP (ref 0–1.5)
INR BLD: 3.17 RATIO — HIGH (ref 0.88–1.16)
LYMPHOCYTES # BLD AUTO: 2.11 K/UL — SIGNIFICANT CHANGE UP (ref 1–3.3)
LYMPHOCYTES # BLD AUTO: 32.1 % — SIGNIFICANT CHANGE UP (ref 13–44)
MAGNESIUM SERPL-MCNC: 2.1 MG/DL — SIGNIFICANT CHANGE UP (ref 1.6–2.6)
MCHC RBC-ENTMCNC: 31.3 PG — SIGNIFICANT CHANGE UP (ref 27–34)
MCHC RBC-ENTMCNC: 33 GM/DL — SIGNIFICANT CHANGE UP (ref 32–36)
MCV RBC AUTO: 94.8 FL — SIGNIFICANT CHANGE UP (ref 80–100)
MONOCYTES # BLD AUTO: 0.84 K/UL — SIGNIFICANT CHANGE UP (ref 0–0.9)
MONOCYTES NFR BLD AUTO: 12.8 % — SIGNIFICANT CHANGE UP (ref 2–14)
NEUTROPHILS # BLD AUTO: 3.36 K/UL — SIGNIFICANT CHANGE UP (ref 1.8–7.4)
NEUTROPHILS NFR BLD AUTO: 51.1 % — SIGNIFICANT CHANGE UP (ref 43–77)
NRBC # BLD: 0 /100 WBCS — SIGNIFICANT CHANGE UP (ref 0–0)
PHOSPHATE SERPL-MCNC: 3.7 MG/DL — SIGNIFICANT CHANGE UP (ref 2.5–4.5)
PLATELET # BLD AUTO: 245 K/UL — SIGNIFICANT CHANGE UP (ref 150–400)
POTASSIUM SERPL-MCNC: 4.1 MMOL/L — SIGNIFICANT CHANGE UP (ref 3.5–5.3)
POTASSIUM SERPL-SCNC: 4.1 MMOL/L — SIGNIFICANT CHANGE UP (ref 3.5–5.3)
PROT SERPL-MCNC: 6.4 G/DL — SIGNIFICANT CHANGE UP (ref 6–8.3)
PROTHROM AB SERPL-ACNC: 37.2 SEC — HIGH (ref 10.5–13.4)
RBC # BLD: 3.87 M/UL — LOW (ref 4.2–5.8)
RBC # FLD: 15.5 % — HIGH (ref 10.3–14.5)
SARS-COV-2 RNA SPEC QL NAA+PROBE: SIGNIFICANT CHANGE UP
SODIUM SERPL-SCNC: 138 MMOL/L — SIGNIFICANT CHANGE UP (ref 135–145)
WBC # BLD: 6.57 K/UL — SIGNIFICANT CHANGE UP (ref 3.8–10.5)
WBC # FLD AUTO: 6.57 K/UL — SIGNIFICANT CHANGE UP (ref 3.8–10.5)

## 2022-05-26 PROCEDURE — 83880 ASSAY OF NATRIURETIC PEPTIDE: CPT

## 2022-05-26 PROCEDURE — 99239 HOSP IP/OBS DSCHRG MGMT >30: CPT

## 2022-05-26 PROCEDURE — 99285 EMERGENCY DEPT VISIT HI MDM: CPT | Mod: 25

## 2022-05-26 PROCEDURE — 85025 COMPLETE CBC W/AUTO DIFF WBC: CPT

## 2022-05-26 PROCEDURE — 85730 THROMBOPLASTIN TIME PARTIAL: CPT

## 2022-05-26 PROCEDURE — 87637 SARSCOV2&INF A&B&RSV AMP PRB: CPT

## 2022-05-26 PROCEDURE — 85610 PROTHROMBIN TIME: CPT

## 2022-05-26 PROCEDURE — 93306 TTE W/DOPPLER COMPLETE: CPT

## 2022-05-26 PROCEDURE — 36415 COLL VENOUS BLD VENIPUNCTURE: CPT

## 2022-05-26 PROCEDURE — 71045 X-RAY EXAM CHEST 1 VIEW: CPT

## 2022-05-26 PROCEDURE — 84484 ASSAY OF TROPONIN QUANT: CPT

## 2022-05-26 PROCEDURE — 80048 BASIC METABOLIC PNL TOTAL CA: CPT

## 2022-05-26 PROCEDURE — 97161 PT EVAL LOW COMPLEX 20 MIN: CPT

## 2022-05-26 PROCEDURE — U0005: CPT

## 2022-05-26 PROCEDURE — 84100 ASSAY OF PHOSPHORUS: CPT

## 2022-05-26 PROCEDURE — 85027 COMPLETE CBC AUTOMATED: CPT

## 2022-05-26 PROCEDURE — 83735 ASSAY OF MAGNESIUM: CPT

## 2022-05-26 PROCEDURE — 96374 THER/PROPH/DIAG INJ IV PUSH: CPT

## 2022-05-26 PROCEDURE — 83605 ASSAY OF LACTIC ACID: CPT

## 2022-05-26 PROCEDURE — 80053 COMPREHEN METABOLIC PANEL: CPT

## 2022-05-26 PROCEDURE — 96375 TX/PRO/DX INJ NEW DRUG ADDON: CPT

## 2022-05-26 PROCEDURE — U0003: CPT

## 2022-05-26 RX ORDER — WARFARIN SODIUM 2.5 MG/1
1 TABLET ORAL
Qty: 30 | Refills: 0
Start: 2022-05-26 | End: 2022-06-24

## 2022-05-26 RX ORDER — METOPROLOL TARTRATE 50 MG
1 TABLET ORAL
Qty: 30 | Refills: 0
Start: 2022-05-26 | End: 2022-06-24

## 2022-05-26 RX ORDER — ATORVASTATIN CALCIUM 80 MG/1
1 TABLET, FILM COATED ORAL
Qty: 30 | Refills: 0
Start: 2022-05-26 | End: 2022-06-24

## 2022-05-26 RX ORDER — FUROSEMIDE 40 MG
1 TABLET ORAL
Qty: 60 | Refills: 0
Start: 2022-05-26 | End: 2022-06-24

## 2022-05-26 RX ADMIN — Medication 40 MILLIGRAM(S): at 05:29

## 2022-05-26 RX ADMIN — Medication 81 MILLIGRAM(S): at 12:07

## 2022-05-26 RX ADMIN — CLOPIDOGREL BISULFATE 75 MILLIGRAM(S): 75 TABLET, FILM COATED ORAL at 12:08

## 2022-05-26 RX ADMIN — Medication 0.5 MILLIGRAM(S): at 00:38

## 2022-05-26 RX ADMIN — Medication 50 MILLIGRAM(S): at 05:29

## 2022-05-26 NOTE — PROGRESS NOTE ADULT - PROBLEM SELECTOR PLAN 3
SBP 100s now; Per hospital records were 110s-120s prior to DC  Could be 2/2 Losartan  - holding Losartan  - c/w Lasix as above with hold parameters  - c/w Metoprolol 50mg BID; uptitrate as tolerated

## 2022-05-26 NOTE — PROGRESS NOTE ADULT - PROBLEM SELECTOR PLAN 7
DVT ppx: on therapeutic warfarin dose; off hep gtt  Diet: DASH   Dispo: Home when INR stable in goal range of 2-3 and CHF optimized

## 2022-05-26 NOTE — DISCHARGE NOTE NURSING/CASE MANAGEMENT/SOCIAL WORK - NSDCVIVACCINE_GEN_ALL_CORE_FT
COVID-19, mRNA, LNP-S, PF, 100 mcg/ 0.5 mL dose ([employer]); 17-May-2022 14:07; Pankaj Velazco (RN); [employer] [id number] (Exp. Date: 05-Jun-2022); IntraMuscular; Deltoid Left.; 0.25 milliLiter(s);

## 2022-05-26 NOTE — PROGRESS NOTE ADULT - ATTENDING COMMENTS
77M Hx Bipolar disorder, anxiety and BPH, and recent cardiogenic shock 2/2 IW NSTEMI p/w relative hypotension with new LV Thrombus now on heparin to coumadin bridge    #ADHF: OGLESBY and orthopnea improved from yesterday, but not baseline. he walked for 10 minutes yesterday, but stated that he was SOB after a few hundered feet, he just kept going.   Will c/w lasix to 40mg PO BID (after discussion with patient, but if further decompensates or not improved, will have to use IV lasix). monitor elytes. c/w Aspirin 81mg,Clopidogrel 75mg, Metoprolol, Losartan 25mg daily (with plans to eventually switch to Entresto). c/w GDMT. f/u with his cardiologist Dr Walden afashlie discharge.   #CAD s/p IWSTEMI c/w DAPT, c/w statin. plan for total of 3 weeks of triple therapy (6/1) then plavix and coumadin.   #LV thrombus: c/w  INR=3.1, dose coumadin 4mg Po tonight. daily INR. Pt ot f/u with the med station (his PCP after discharge).  Dispo: likely tomorrow if OGLESBY improved. 77M Hx Bipolar disorder, anxiety and BPH, and recent cardiogenic shock 2/2 IW NSTEMI p/w relative hypotension with new LV Thrombus now on heparin to coumadin bridge    #ADHF: OGLESBY and orthopnea improved from yesterday, but not baseline. however, he walked for 10 minutes yesterday, but stated that he has some SOB, but he just kept going.   Will c/w lasix to 40mg PO BID and will f/u with his cardiologist tomorrow. . monitor elytes. c/w Aspirin 81mg,Clopidogrel 75mg, Metoprolol, Losartan 25mg daily (with plans to eventually switch to Entresto). c/w GDMT. f/u with his cardiologist Dr Walden after discharge.   #CAD s/p IWSTEMI c/w DAPT, c/w statin. plan for total of 3 weeks of triple therapy (6/1) then plavix and coumadin.   #LV thrombus: c/w  INR=3.1, dose coumadin 4mg Po tonight. daily INR. Pt ot f/u with the med station (his PCP after discharge).  Dispo: today    Discharge: Spent >35 minutes

## 2022-05-26 NOTE — DISCHARGE NOTE NURSING/CASE MANAGEMENT/SOCIAL WORK - NSDCPNINST_GEN_ALL_CORE
Call and make follow up appointment with MD after discharged. Return to the nearest emergency room or call 911 for worsening symptoms of shortness of breath, chest pain, dizziness, leg swelling or palpitations.

## 2022-05-26 NOTE — DIETITIAN INITIAL EVALUATION ADULT - REASON FOR ADMISSION
"77M Hx Bipolar disorder, anxiety and BPH, and recent cardiogenic shock 2/2 IW NSTEMI p/w relative hypotension with new LV Thrombus, s/p heparin bridge to coumadin, now awaiting stable INR and optimization of CHF"

## 2022-05-26 NOTE — DIETITIAN INITIAL EVALUATION ADULT - OTHER INFO
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  He is not able to provide much helpful history due to memory difficulties but according to ED notes he was sent to the ED by his primary care physician due to difficulty swallowing and dehydration resulting from that.  He does not know why he is here, but acknowledges that he is in a healthcare setting by asking, Am I real sick?  He denies having any complaints at this time other than being thirsty and wanting some water.  
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 Per his daughter, Mr. Pitts developed decreased PO intake which prompted presentation to the ED.    He was admitted to hospital medicine on 6/5/20. SLP consulted for evaluation of dysphagia. A rapid response was called on 6/9/2020 for episode of afib with RVR, hypotension, and unresponsiveness with upper airway gurgling noises. He was febrile earlier that morning. 2L NS given and broad spectrum abx were ordered however not given due to poor IV access. Critical care medicine consulted and patient transferred to [facility] for hypotension and AMS.   
Weight:  lbs. Pt with fluid retention causing wt gain. Wt was 197 lbs (5/22) and now 194.6 lbs (5/26) s/p diuresis.

## 2022-05-26 NOTE — DIETITIAN INITIAL EVALUATION ADULT - LITERATURE/VIDEOS GIVEN
Heart failure nutrition therapy, low sodium foods list, heart healthy shopping tips and label reading, and Vitamin K and medications nutrition education by [employer]

## 2022-05-26 NOTE — PROGRESS NOTE ADULT - PROVIDER SPECIALTY LIST ADULT
Cardiology
Internal Medicine

## 2022-05-26 NOTE — PROGRESS NOTE ADULT - SUBJECTIVE AND OBJECTIVE BOX
PROGRESS NOTE:   Authored by Dr. Ian Baldwin MD    Patient is a 77y old  Male who presents with a chief complaint of HF exacerbation (25 May 2022 07:28)      SUBJECTIVE / OVERNIGHT EVENTS:  - Overnight, no acute events.  - Today, pt seen and examined at bedside in AM. Denied f/c/n/v, CP, SOB, abdominal pain, dysuria, hematuria, hematochezia, melena, diarrhea, constipation.    MEDICATIONS  (STANDING):  aspirin enteric coated 81 milliGRAM(s) Oral daily  atorvastatin 80 milliGRAM(s) Oral at bedtime  clopidogrel Tablet 75 milliGRAM(s) Oral daily  furosemide    Tablet 40 milliGRAM(s) Oral two times a day  melatonin 5 milliGRAM(s) Oral at bedtime  metoprolol tartrate 50 milliGRAM(s) Oral two times a day  mirtazapine 15 milliGRAM(s) Oral at bedtime  tamsulosin 0.4 milliGRAM(s) Oral at bedtime  venlafaxine XR. 150 milliGRAM(s) Oral every 24 hours    MEDICATIONS  (PRN):  acetaminophen     Tablet .. 650 milliGRAM(s) Oral every 6 hours PRN Temp greater or equal to 38C (100.4F), Mild Pain (1 - 3)  clonazePAM  Tablet 0.5 milliGRAM(s) Oral every 12 hours PRN anxiety      CAPILLARY BLOOD GLUCOSE        I&O's Summary    25 May 2022 07:01  -  26 May 2022 07:00  --------------------------------------------------------  IN: 360 mL / OUT: 0 mL / NET: 360 mL        PHYSICAL EXAM:  Vital Signs Last 24 Hrs  T(C): 36.6 (26 May 2022 04:40), Max: 36.7 (25 May 2022 13:47)  T(F): 97.8 (26 May 2022 04:40), Max: 98 (25 May 2022 13:47)  HR: 70 (26 May 2022 04:40) (65 - 73)  BP: 105/72 (26 May 2022 04:40) (100/68 - 108/75)  BP(mean): --  RR: 18 (26 May 2022 04:40) (18 - 18)  SpO2: 98% (26 May 2022 04:40) (96% - 99%)    GENERAL: NAD, well-developed  HEENT: sclera clear  CHEST/LUNG: Slightly decreased breath sounds on auscultation of left base, otherwise clear to auscultation bilaterally; No wheezes or rales  HEART: Regular rate and rhythm; No murmurs, rubs, or gallops  ABDOMEN: Soft, Nontender, Nondistended; Bowel sounds present  EXTREMITIES:  2+ Peripheral Pulses, No clubbing or cyanosis; trace pitting edema in b/l LEs  PSYCH: AAOx3  NEUROLOGY: non-focal  SKIN: No rashes or lesions    LABS:                        12.1   6.57  )-----------( 245      ( 26 May 2022 06:12 )             36.7     05-26    138  |  105  |  26<H>  ----------------------------<  107<H>  4.1   |  21<L>  |  1.13    Ca    9.1      26 May 2022 06:13  Phos  3.7     05-26  Mg     2.1     05-26    TPro  6.4  /  Alb  3.3  /  TBili  0.7  /  DBili  x   /  AST  35  /  ALT  46<H>  /  AlkPhos  182<H>  05-26    PT/INR - ( 26 May 2022 06:09 )   PT: 37.2 sec;   INR: 3.17 ratio         PTT - ( 26 May 2022 06:09 )  PTT:38.6 sec            RADIOLOGY & ADDITIONAL TESTS:  No new imaging PROGRESS NOTE:   Authored by Dr. Ian Baldwin MD    Patient is a 77y old  Male who presents with a chief complaint of HF exacerbation (25 May 2022 07:28)      SUBJECTIVE / OVERNIGHT EVENTS:  - Overnight, no acute events.  - Today, pt seen and examined at bedside in AM. Pt reports feeling better overall, notes he had a little OGLESBY after walking around the unit this morning. Denied f/c/n/v, CP, SOB, abdominal pain, dysuria, hematuria, hematochezia, melena, diarrhea, constipation.    MEDICATIONS  (STANDING):  aspirin enteric coated 81 milliGRAM(s) Oral daily  atorvastatin 80 milliGRAM(s) Oral at bedtime  clopidogrel Tablet 75 milliGRAM(s) Oral daily  furosemide    Tablet 40 milliGRAM(s) Oral two times a day  melatonin 5 milliGRAM(s) Oral at bedtime  metoprolol tartrate 50 milliGRAM(s) Oral two times a day  mirtazapine 15 milliGRAM(s) Oral at bedtime  tamsulosin 0.4 milliGRAM(s) Oral at bedtime  venlafaxine XR. 150 milliGRAM(s) Oral every 24 hours    MEDICATIONS  (PRN):  acetaminophen     Tablet .. 650 milliGRAM(s) Oral every 6 hours PRN Temp greater or equal to 38C (100.4F), Mild Pain (1 - 3)  clonazePAM  Tablet 0.5 milliGRAM(s) Oral every 12 hours PRN anxiety      CAPILLARY BLOOD GLUCOSE        I&O's Summary    25 May 2022 07:01  -  26 May 2022 07:00  --------------------------------------------------------  IN: 360 mL / OUT: 0 mL / NET: 360 mL        PHYSICAL EXAM:  Vital Signs Last 24 Hrs  T(C): 36.6 (26 May 2022 04:40), Max: 36.7 (25 May 2022 13:47)  T(F): 97.8 (26 May 2022 04:40), Max: 98 (25 May 2022 13:47)  HR: 70 (26 May 2022 04:40) (65 - 73)  BP: 105/72 (26 May 2022 04:40) (100/68 - 108/75)  BP(mean): --  RR: 18 (26 May 2022 04:40) (18 - 18)  SpO2: 98% (26 May 2022 04:40) (96% - 99%)    GENERAL: NAD, well-developed  HEENT: sclera clear  CHEST/LUNG: Slightly decreased breath sounds on auscultation of left base, otherwise clear to auscultation bilaterally; No wheezes or rales  HEART: Regular rate and rhythm; No murmurs, rubs, or gallops  ABDOMEN: Soft, Nontender, Nondistended; Bowel sounds present  EXTREMITIES:  2+ Peripheral Pulses, No clubbing or cyanosis; trace pitting edema in b/l LEs  PSYCH: AAOx3  NEUROLOGY: non-focal  SKIN: No rashes or lesions    LABS:                        12.1   6.57  )-----------( 245      ( 26 May 2022 06:12 )             36.7     05-26    138  |  105  |  26<H>  ----------------------------<  107<H>  4.1   |  21<L>  |  1.13    Ca    9.1      26 May 2022 06:13  Phos  3.7     05-26  Mg     2.1     05-26    TPro  6.4  /  Alb  3.3  /  TBili  0.7  /  DBili  x   /  AST  35  /  ALT  46<H>  /  AlkPhos  182<H>  05-26    PT/INR - ( 26 May 2022 06:09 )   PT: 37.2 sec;   INR: 3.17 ratio         PTT - ( 26 May 2022 06:09 )  PTT:38.6 sec            RADIOLOGY & ADDITIONAL TESTS:  No new imaging

## 2022-05-26 NOTE — PROGRESS NOTE ADULT - ASSESSMENT
77M Hx Bipolar disorder, anxiety and BPH, and recent cardiogenic shock 2/2 IW NSTEMI p/w relative hypotension with new LV Thrombus, s/p heparin bridge to coumadin, now awaiting stable INR and optimization of CHF

## 2022-05-26 NOTE — DIETITIAN INITIAL EVALUATION ADULT - PERTINENT MEDS FT
MEDICATIONS  (STANDING):  aspirin enteric coated 81 milliGRAM(s) Oral daily  atorvastatin 80 milliGRAM(s) Oral at bedtime  clopidogrel Tablet 75 milliGRAM(s) Oral daily  furosemide    Tablet 40 milliGRAM(s) Oral two times a day  melatonin 5 milliGRAM(s) Oral at bedtime  metoprolol tartrate 50 milliGRAM(s) Oral two times a day  mirtazapine 15 milliGRAM(s) Oral at bedtime  tamsulosin 0.4 milliGRAM(s) Oral at bedtime  venlafaxine XR. 150 milliGRAM(s) Oral every 24 hours    MEDICATIONS  (PRN):  acetaminophen     Tablet .. 650 milliGRAM(s) Oral every 6 hours PRN Temp greater or equal to 38C (100.4F), Mild Pain (1 - 3)  clonazePAM  Tablet 0.5 milliGRAM(s) Oral every 12 hours PRN anxiety

## 2022-05-26 NOTE — DIETITIAN INITIAL EVALUATION ADULT - ORAL INTAKE PTA/DIET HISTORY
Pt reports good PO intake at home, eats a relatively healthy diet and exercises. Did not follow a low sodium diet PTA.

## 2022-05-26 NOTE — PROGRESS NOTE ADULT - PROBLEM SELECTOR PLAN 2
- new LV thrombus seen on TTE on admission  - s/p heparin bridge to warfarin  - INR 2.4 (therapeutic x1), will continue to monitor for stable INR and warfarin regimen  - dose warfarin daily

## 2022-05-26 NOTE — DIETITIAN INITIAL EVALUATION ADULT - HEIGHT FOR BMI (INCHES)
History of Present Illness





- Reason for Consult


Consult date: 21


GI bleed


Requesting physician: Juan J Wilson





- Chief Complaint


coffee ground emesis





- History of Present Illness





This is a 30-year-old male patient with multiple comorbidities including mental 

retardation, chronic contracture, autism long-standing seizure history who was 

brought in by his caretaker Mulu was his aunt because of dark red blood and PEG

tube.  After speaking today and on phone she states that yesterday she saw some 

dark red blood in the PEG tube, however denied that any nausea or vomiting.  

States that he had been anemic and she was concerned that there was a GI bleed. 

States he does take iron.  She does state he has a history of ulcers however 

unsure if he's had upper endoscopy recently.  Believes that last time he was 

diagnosed with ulcer was in 2018 he had [facility].  States he has a 

long-standing history as well of chronic constipation.  He is nonverbal.  

Admitting labs WBC 7.2 hemoglobin 9.6 hematocrit 28, platelet count 359,000, 

LFTs unremarkable.  Gastric occult blood positive.  Repeat labs today hemoglobin

stable at 9.6.  Nursing staff is reporting no nausea or vomiting no blood seen 

from the trach tube.  No signs or symptoms of GI bleed.





Review of Systems





Patient is nonverbal


ROS unobtainable: due to mental status





Past Medical History


Past Medical History: Asthma, Renal Disease, Seizure Disorder


Additional Past Medical History / Comment(s): Developmental delay, Autism, daily

seizures,  [place] Gasteat Seizures, last Grand mal Seizure (Status Epilepticus 

May 11 2018) has trach, PEG Tube, urinary retention, chronic constipation. Hx 

ulcers. Tenses up, clenches fists, shakes, a lot, screeches when excited, has a 

strong upper body. Arms  are permanently bent, he walks on toes. "Flight Risk."


History of Any Multi-Drug Resistant Organisms: VRE


Year Discovered:: 3/15/20


MDRO Source:: VRE URINE


Additional Past Surgical History / Comment(s): Vagal nerve stimulator, left UPJ 

endopylotomy, embolist surgery (renal artery), heel cord lengthening, 

transurethral bladder neck and prostate incision 2020. Traceostomy. PEG 

Tube palced. Exploratory Laprotomy.


Past Anesthesia/Blood Transfusion Reactions: No Reported Reaction


Past Psychological History: ADD/ADHD


Smoking Status: Never smoker


Past Alcohol Use History: None Reported


Past Drug Use History: None Reported





- Past Family History


  ** Mother


Additional Family Medical History / Comment(s):  of Suicide at 22.





Medications and Allergies


                                Home Medications











 Medication  Instructions  Recorded  Confirmed  Type


 


Lacosamide [Vimpat] 200 mg PEG/G-TUBE 16 History





 TID@1000,1700,0000   


 


Folic Acid 1 mg PEG/G-TUBE DAILY@1000 19 History


 


clonazePAM [KlonoPIN] 1 mg PEG/G-TUBE TID@1000,1700,0000 19 

History


 


levETIRAcetam [Keppra Oral 14 ml PEG/G-TUBE TID@1000,1700,0000 19

 History





Solution]    


 


polyethylene glycoL 3350 [Miralax] 17 gm PEG/G-TUBE TID@1000,1700,0000 19 History


 


Dexlansoprazole [Dexilant] 60 mg PEG/G-TUBE DAILY@1000 20 History


 


Tamsulosin [Flomax] 0.4 mg PEG/G-TUBE DAILY@1000 20 History


 


Cbd Capsule 750 mg PEG/G-TUBE HS@0000 20 History


 


Lactulose 30 gm PO TID@1000,1700,0000 20 History


 


Banzel 400mg 1,200 mg PEG/G-TUBE DAILY@1200 21 History


 


Banzel 400mg 1,600 mg PEG/G-TUBE HS@0000 21 History


 


Pyridoxine HCl (Vitamin B6) 100 mg PO HS@0000 21 History





[Vitamin B-6]    


 


Diazepam [Valium] 5 mg PO DAILY PRN 21 History


 


Vitamin B-6 Complex 1 tab PEG/G-TUBE DAILY@1000 21 History


 


cloBAZam [Clobazam] 20 mg PO BID@1200,0000 21 History


 


Scopolamine [Scopolamine 1 MG/72 1 patch TRANSDERM Q72H #10 patch 21 Rx





HR patch]    








                                    Allergies











Allergy/AdvReac Type Severity Reaction Status Date / Time


 


amoxicillin trihydrate Allergy  Unknown Verified 21 22:09





[From Augmentin]     


 


ceftriaxone sodium Allergy  Unknown Verified 21 22:09





[From Rocephin]     


 


cephalexin monohydrate Allergy  Unknown Verified 21 22:09





[From Keflex]     


 


Penicillins Allergy  Unknown Verified 21 22:09


 


phenobarbital Allergy  Unknown Verified 21 22:09


 


phenytoin sodium Allergy  Unknown Verified 21 22:09





[From Dilantin]     


 


phenytoin sodium extended Allergy  Unknown Verified 21 22:09





[From Dilantin]     


 


potassium clavulanate Allergy  Unknown Verified 21 22:09





[From Augmentin]     


 


Cephalosporins AdvReac  Unknown Verified 21 22:09


 


lorazepam [From Ativan] AdvReac  Unknown Verified 21 22:09


 


surgical tape AdvReac  hyperpigmentation Uncoded 21 21:04





   of skin  














Physical Exam


Vitals: 


                                   Vital Signs











  Temp Pulse Resp BP Pulse Ox


 


 21 07:53  98.9 F  91  18  93/51  100


 


 21 06:06  97.8 F  95  20  95/49  97


 


 21 02:11   71  15  107/69  98


 


 21 21:08  98.4 F  77  15  92/62  100








                                Intake and Output











 21





 22:59 06:59 14:59


 


Other:   


 


  Weight 54.885 kg  














General appearance: The patient is awake, nonverbal.


HET: Head is normocephalic with soft protective helmet.


Neck: Supple without lymphadenopathy.  Trachea midline.  Tracheostomy present


Heart: S1 S2.  Regular rate and rhythm.


Lungs: Clear to auscultation.


Abdomen: Soft, nontender, nondistended..  No guarding or rigidity.  PEG tube 


 present.Skin:  No rashes. No jaundice.


Extremities: Normal skin color and turgor.  No pedal edema.


Neurological: patient with mental retardation and nonverbal.





Results


CBC & Chem 7: 


                                 21 11:30





                                 21 21:29


Labs: 


                  Abnormal Lab Results - Last 24 Hours (Table)











  21 Range/Units





  21:29 21:29 21:29 


 


RBC  2.73 L    (4.30-5.90)  m/uL


 


Hgb  9.6 L    (13.0-17.5)  gm/dL


 


Hct  28.8 L    (39.0-53.0)  %


 


MCV  105.4 H    (80.0-100.0)  fL


 


RDW  17.2 H    (11.5-15.5)  %


 


APTT   21.8 L   (22.0-30.0)  sec


 


Chloride    114 H  ()  mmol/L


 


Total Protein    6.2 L  (6.3-8.2)  g/dL


 


Albumin    3.3 L  (3.5-5.0)  g/dL














Assessment and Plan


(1) Anemia


Narrative/Plan: 


30-year-old male with multiple comorbidities including history of mental 

retardation, autism, significant seizure disorder and reported history of peptic

ulcer disease came into the emergency department brought in by his aunt who is 

his caretaker who reported that she saw some dark red blood in his PEG tube.  

She states that he has been recently treated for anemia and has been on iron but

states that she believes that he may have some GI bleed and wanted further 

evaluation.  Once hospitalized he is had no further GI bleed.  Hemoglobin on 

admission was 9.6 and today remains 9.6.  Nursing has reported no further signs 

of GI bleed although gastric occult was positive.  Patient has a tracheostomy as

well as a PEG tube in place.  He is nonverbal.  Abdomen had been soft with no 

guarding or rigidity with palpation.  No planned then endoscopic evaluation.  

Continue to monitor for signs and symptoms of GI bleed.


Current Visit: Yes   Status: Acute   Code(s): D64.9 - ANEMIA, UNSPECIFIED   

SNOMED Code(s): 438486712


   





(2) Seizure disorder


Current Visit: No   Status: Acute   Code(s): G40.909 - EPILEPSY, UNSP, NOT 

INTRACTABLE, WITHOUT STATUS EPILEPTICUS   SNOMED Code(s): 733744210


   


Plan: 





1.  Continue symptomatic and supportive care


2.  Continue to monitor for signs and symptoms of GI bleed.


3.  Protonix 40 mg daily


4.  No plan for an endoscopic evaluation








Thank you for allowing us to participate in the care of the patient, the GI 

service will sign off, gastroenterology will not be available at the hospital 

this weekend and through next week.  If further evaluation by gastroenterology 

is required the patient will need transfer as per the primary team's discretion.










Dr. RICHARD Smith


I agree with the dictator's note, documented as a scribe by Emelia GUIDRY. 0

## 2022-05-26 NOTE — DISCHARGE NOTE NURSING/CASE MANAGEMENT/SOCIAL WORK - PATIENT PORTAL LINK FT
You can access the FollowMyHealth Patient Portal offered by [facility] by registering at the following website: [email or web address]. By joining [email or web address]’s FollowMyHealth portal, you will also be able to view your health information using other applications (apps) compatible with our system.

## 2022-05-26 NOTE — DIETITIAN INITIAL EVALUATION ADULT - ADD RECOMMEND
1) Continue DASH diet. 2) Monitor PO intake, GI tolerance, skin integrity and labs. RD remains available if needed, pt is aware.

## 2022-05-26 NOTE — DIETITIAN INITIAL EVALUATION ADULT - PERTINENT LABORATORY DATA
05-26    138  |  105  |  26<H>  ----------------------------<  107<H>  4.1   |  21<L>  |  1.13    Ca    9.1      26 May 2022 06:13  Phos  3.7     05-26  Mg     2.1     05-26    TPro  6.4  /  Alb  3.3  /  TBili  0.7  /  DBili  x   /  AST  35  /  ALT  46<H>  /  AlkPhos  182<H>  05-26  A1C with Estimated Average Glucose Result: 5.9 % (05-11-22 @ 18:29)

## 2022-05-26 NOTE — PROGRESS NOTE ADULT - PROBLEM SELECTOR PLAN 5
SCr 1.35 while baseline 1.0-1.1 likely 2/2 new Losartan use  - Hold Losartan for now  - c/w Lasix as above  - Avoid nephrotoxic agents  - Now downtrending back to baseline

## 2022-05-26 NOTE — PROGRESS NOTE ADULT - PROBLEM SELECTOR PLAN 1
HFrEF with CAD s/p recent STEMI  TTE 5/12: EF 35 %, severe LV systolic dysfunction in setting of cardiogenic shock 2/2 [facility] showed 100% occlusion for right coronary artery and 70% occlusion of left anterior descending artery.  The arteries were not intervened upon due to viability study showing no viability in the areas supplied by the occluded arteries.  Home meds: Aspirin 81mg,Clopidogrel 75mg, Metoprolol succinate 150mg daily and Losartan 25mg daily (with plans to eventually switch to Entresto). Atorvastatin was started but then held given rise in LFTs.   Now presenting with OGLESBY, nausea, and relative hypotension  CXR shows new small b/l pleural effusions  pro-BNP 8357 (no baseline)  EKG NSR   s/p 20 IV lasix with 500 cc urine output    - Strict I/Os, standing weights daily  - Repeat TTE showing LV thrombus   - cards consulted, recs appreciated   - held losartan in setting of relative hypotension/ questionable BRODY  - c/w atorvastatin 80mg qhs (was stopped last admission due to elevated LFTs which have now downtrended)  - c/w DAPT and AC for now, will eventually d/c ASA to avoid triple therapy (per cards, reasonable to stop ASA in 1 week on 5/31 for total of 3 weeks on ASA)  - c/w metoprolol 50mg BID  - will increased Lasix 20mg PO daily to 40mg PO BID for now as BP tolerates; will continue to reassess  - appreciate Cardiology recs

## 2022-05-26 NOTE — DISCHARGE NOTE NURSING/CASE MANAGEMENT/SOCIAL WORK - NSDCPEFALRISK_GEN_ALL_CORE
For information on Fall & Injury Prevention, visit: [email or web address] OR  [email or web address] OR  https://www.cdc.gov/steadi/patient.html

## 2022-05-26 NOTE — DIETITIAN INITIAL EVALUATION ADULT - PERSON TAUGHT/METHOD
CHF and Coumadin nutrition therapy provided./verbal instruction/written material/patient instructed/spouse instructed

## 2022-05-26 NOTE — CHART NOTE - NSCHARTNOTEFT_GEN_A_CORE
RD CHF education chart note    Patient was visited by RD for CHF education. Heart failure education provided to the patient in detail. Discussed heart failure nutrition therapy, sodium and fluid intake, importance of diet adherence, daily weights monitoring with the patient. Reinforced importance of weight gain parameters and importance of contacting MD’s about weight changes. Provided handouts on heart failure nutrition therapy, reading heart healthy nutrition labels, heart healthy shopping tips and low sodium food list. Patient verbalized understanding demonstrated by teach back method. RD contact information left with patient for any future questioning.  Maria E Villarreal RD, CDN, [facility]. Pager: [phone]

## 2022-05-27 ENCOUNTER — APPOINTMENT (OUTPATIENT)
Dept: CARDIOLOGY | Facility: CLINIC | Age: 77
End: 2022-05-27
Payer: MEDICARE

## 2022-05-27 ENCOUNTER — NON-APPOINTMENT (OUTPATIENT)
Age: 77
End: 2022-05-27

## 2022-05-27 VITALS
DIASTOLIC BLOOD PRESSURE: 68 MMHG | BODY MASS INDEX: 25.87 KG/M2 | HEIGHT: 72 IN | OXYGEN SATURATION: 92 % | RESPIRATION RATE: 19 BRPM | TEMPERATURE: 97.5 F | SYSTOLIC BLOOD PRESSURE: 109 MMHG | WEIGHT: 191 LBS | HEART RATE: 81 BPM

## 2022-05-27 LAB
INR PPP: 5.4 RATIO
POCT-PROTHROMBIN TIME: 65.1 SECS

## 2022-05-27 PROCEDURE — 99214 OFFICE O/P EST MOD 30 MIN: CPT

## 2022-05-27 PROCEDURE — 85610 PROTHROMBIN TIME: CPT | Mod: QW

## 2022-05-27 PROCEDURE — 93000 ELECTROCARDIOGRAM COMPLETE: CPT

## 2022-05-31 ENCOUNTER — APPOINTMENT (OUTPATIENT)
Dept: CARDIOLOGY | Facility: CLINIC | Age: 77
End: 2022-05-31
Payer: MEDICARE

## 2022-05-31 LAB
INR PPP: 1.3 RATIO
POCT-PROTHROMBIN TIME: 15.7 SECS
QUALITY CONTROL: YES

## 2022-05-31 PROCEDURE — 85610 PROTHROMBIN TIME: CPT | Mod: QW

## 2022-05-31 PROCEDURE — 99211 OFF/OP EST MAY X REQ PHY/QHP: CPT | Mod: 25

## 2022-06-03 ENCOUNTER — APPOINTMENT (OUTPATIENT)
Dept: CARDIOLOGY | Facility: CLINIC | Age: 77
End: 2022-06-03
Payer: MEDICARE

## 2022-06-03 LAB
INR PPP: 1.4 RATIO
POCT-PROTHROMBIN TIME: 16.4 SECS
QUALITY CONTROL: YES

## 2022-06-03 PROCEDURE — 99211 OFF/OP EST MAY X REQ PHY/QHP: CPT | Mod: 25

## 2022-06-03 PROCEDURE — 85610 PROTHROMBIN TIME: CPT | Mod: QW

## 2022-06-06 NOTE — REASON FOR VISIT
[Symptom and Test Evaluation] : symptom and test evaluation [CV Risk Factors and Non-Cardiac Disease] : CV risk factors and non-cardiac disease [Coronary Artery Disease] : coronary artery disease [FreeTextEntry3] : Dr. Lopez [FreeTextEntry1] : John recently reports depression and  nausea. He  is undergoing psychiatric evaluations on medical therapy. He feels like he has slowed down and feels tired and short of breath. He usually swims 3 times per week. He has anxiety and depression. His wife has noted a distinct difference recently. His brother is undergoing cardiac stents. He underwent vaccination November 20 2021 with Moderna. He suffered a COVID illness with the omicron variant on Christmas graciela. The  PCR was positive after rapid was negative. He is being seen by GI service.   He comes today for clarification of  his overall cardiovascular risk. He was advised to undergo a complete cardiac evaluation. \par \par 5/27/22\par John comes today after a prolonged hospitalization for a cardiomyopathy coronary disease and shock. He was placed on anticoagulation for LV thrombus and we note a marked elevation in INR today at 5 and his warfarin is held.

## 2022-06-06 NOTE — ASSESSMENT
[FreeTextEntry1] : EKG is of concern with some ST segment elevation in V2 and V3 with a QS pattern consistent with infarction.  We discussed etiology of dyspnea which may be on the basis of coronary disease or even myocarditis and even COVID or inflammatory changes. He is referred to emergency room and initial troponin is elevated 260 with a BNP of 4724 which confers high risk. We discussed the options including cardiac catheterization and angiography vs medical therapy. Care is coordinated with Ivon in the emergency room with consideration to urgent cath based upon signs and symptoms. Consider heparin aspirin Plavix beta-blocker statin echo and urgent cath.We discussed the pros and cons of plain treadmill stress testing nuclear stress testing and angiography including a sensitivity analysis.\par More than half of the face to face encounter of  60 minutes  was spent in counseling the patient with respect to an abnormal EKG and cardiovascular risk\par \par 5/27/22 addendum\par adjust warfarin. heart failure guidelines discussed. dc asa 5/31 CHF guidelines weight consider reintroduction of ACE ARB ARNI as per recommendations and guidelines. consider spironolactone FARXIGA as tolerated.\par \par Medical necessity\par This is a high encounter based upon two or more chronic illnesses with mild exacerbation requiring further management and evaluation.  \par \par discussed with wife at bedside

## 2022-06-06 NOTE — CARDIOLOGY SUMMARY
[de-identified] : 5/11/2022 normal sinus rhythm anteroseptal pattern anterior ST segment elevation \par 5/27/22 anterior mi AI lafb rsr pac [de-identified] : 5/20/22 EF .3  LVT 1x1.3 cm

## 2022-06-10 ENCOUNTER — APPOINTMENT (OUTPATIENT)
Dept: CARDIOLOGY | Facility: CLINIC | Age: 77
End: 2022-06-10
Payer: MEDICARE

## 2022-06-10 LAB
INR PPP: 1.5 RATIO
POCT-PROTHROMBIN TIME: 17.4 SECS
QUALITY CONTROL: YES

## 2022-06-10 PROCEDURE — 85610 PROTHROMBIN TIME: CPT | Mod: QW

## 2022-06-10 PROCEDURE — 99211 OFF/OP EST MAY X REQ PHY/QHP: CPT | Mod: 25

## 2022-06-16 ENCOUNTER — TRANSCRIPTION ENCOUNTER (OUTPATIENT)
Age: 77
End: 2022-06-16

## 2022-06-17 ENCOUNTER — APPOINTMENT (OUTPATIENT)
Dept: CARDIOLOGY | Facility: CLINIC | Age: 77
End: 2022-06-17
Payer: MEDICARE

## 2022-06-17 ENCOUNTER — TRANSCRIPTION ENCOUNTER (OUTPATIENT)
Age: 77
End: 2022-06-17

## 2022-06-17 LAB
INR PPP: 2 RATIO
POCT-PROTHROMBIN TIME: 23.8 SECS

## 2022-06-17 PROCEDURE — 99211 OFF/OP EST MAY X REQ PHY/QHP: CPT | Mod: 25

## 2022-06-17 PROCEDURE — 85610 PROTHROMBIN TIME: CPT | Mod: QW

## 2022-06-23 ENCOUNTER — APPOINTMENT (OUTPATIENT)
Dept: CARDIOLOGY | Facility: CLINIC | Age: 77
End: 2022-06-23
Payer: MEDICARE

## 2022-06-23 LAB
INR PPP: 2.9 RATIO
POCT-PROTHROMBIN TIME: 39.1 SECS
QUALITY CONTROL: YES

## 2022-06-23 PROCEDURE — 99211 OFF/OP EST MAY X REQ PHY/QHP: CPT | Mod: 25

## 2022-06-23 PROCEDURE — 85610 PROTHROMBIN TIME: CPT | Mod: QW

## 2022-06-28 NOTE — DISCHARGE NOTE NURSING/CASE MANAGEMENT/SOCIAL WORK - NSSCTYPOFSERV_GEN_ALL_CORE
Interval History: Pt states that his pain is improving.  He mentions that he has been having a better day and denies groin pain.  There were no other reports overnight.      Review of Systems   Constitutional: Negative for diaphoresis and fever.   HENT:  Negative for congestion and hearing loss.    Eyes:  Negative for blurred vision and pain.   Cardiovascular:  Negative for chest pain, claudication, dyspnea on exertion, leg swelling, near-syncope, palpitations and syncope.   Respiratory:  Negative for shortness of breath and sleep disturbances due to breathing.    Hematologic/Lymphatic: Negative for bleeding problem. Does not bruise/bleed easily.   Skin:  Positive for poor wound healing. Negative for color change.   Gastrointestinal:  Negative for abdominal pain and nausea.   Genitourinary:  Negative for bladder incontinence and flank pain.   Neurological:  Negative for focal weakness and light-headedness.   Objective:     Vital Signs (Most Recent):  Temp: 97.6 °F (36.4 °C) (06/28/22 0809)  Pulse: 60 (06/28/22 1100)  Resp: 16 (06/28/22 0908)  BP: 114/66 (06/28/22 1100)  SpO2: 96 % (06/28/22 0810)   Vital Signs (24h Range):  Temp:  [96.8 °F (36 °C)-98.5 °F (36.9 °C)] 97.6 °F (36.4 °C)  Pulse:  [60-61] 60  Resp:  [15-18] 16  SpO2:  [94 %-97 %] 96 %  BP: ()/(50-66) 114/66     Weight: 117.9 kg (259 lb 14.8 oz)  Body mass index is 34.29 kg/m².     SpO2: 96 %  O2 Device (Oxygen Therapy): room air    No intake or output data in the 24 hours ending 06/28/22 1112      Lines/Drains/Airways       Central Venous Catheter Line  Duration                  Hemodialysis AV Graft Right forearm -- days              Peripheral Intravenous Line  Duration                  Peripheral IV - Single Lumen 06/24/22 2325 18 G Anterior;Left Forearm 3 days                    Physical Exam  Constitutional:       Appearance: He is well-developed. He is obese. He is not diaphoretic.   HENT:      Head: Normocephalic and atraumatic.   Eyes:       General: No scleral icterus.     Pupils: Pupils are equal, round, and reactive to light.   Neck:      Vascular: No JVD.   Cardiovascular:      Rate and Rhythm: Normal rate and regular rhythm.      Pulses: Intact distal pulses.      Heart sounds: S1 normal and S2 normal. No murmur heard.    No friction rub. No gallop.   Pulmonary:      Effort: Pulmonary effort is normal. No respiratory distress.      Breath sounds: Normal breath sounds. No wheezing or rales.   Chest:      Chest wall: No tenderness.   Abdominal:      General: Bowel sounds are normal. There is no distension.      Palpations: Abdomen is soft. There is no mass.      Tenderness: There is no abdominal tenderness. There is no rebound.   Musculoskeletal:         General: Tenderness present. Normal range of motion.      Cervical back: Normal range of motion and neck supple.      Comments: 5/10 TTP of RLE    Skin:     General: Skin is warm and dry.      Coloration: Skin is not pale.   Neurological:      Mental Status: He is alert and oriented to person, place, and time.      Coordination: Coordination normal.      Deep Tendon Reflexes: Reflexes normal.   Psychiatric:         Behavior: Behavior normal.         Judgment: Judgment normal.       Significant Labs: BMP:   Recent Labs   Lab 06/28/22  1007   *   *   K 4.2   CL 97   CO2 20*   BUN 52*   CREATININE 10.8*   CALCIUM 8.6*     , CMP   Recent Labs   Lab 06/28/22  1007   *   K 4.2   CL 97   CO2 20*   *   BUN 52*   CREATININE 10.8*   CALCIUM 8.6*   ALBUMIN 2.3*   ANIONGAP 17*   ESTGFRAFRICA 5*   EGFRNONAA 4*     , CBC   Recent Labs   Lab 06/28/22  1007   WBC 13.55*   HGB 9.1*   HCT 28.4*   *     , INR No results for input(s): INR, PROTIME in the last 48 hours., Lipid Panel No results for input(s): CHOL, HDL, LDLCALC, TRIG, CHOLHDL in the last 48 hours., Troponin No results for input(s): TROPONINI in the last 48 hours., and All pertinent lab results from the last 24 hours have  been reviewed.    Significant Imaging: Echocardiogram: 2D echo with color flow doppler:   Results for orders placed or performed during the hospital encounter of 09/18/15   2D echo with color flow doppler   Result Value Ref Range    EF + QEF 50 55 - 65    Diastolic Dysfunction Yes (A)     Est. PA Systolic Pressure 8.57     Mitral Valve Mobility NORMAL     Tricuspid Valve Regurgitation TRIVIAL     Narrative    TEST DESCRIPTION   Technical Quality: This is a technically adequate study.     Aorta: The aortic root is normal in size, measuring 3.9 cm at sinotubular junction.     Left Atrium: The left atrial volume index is severely enlarged, measuring 49.98 cc/m2.     Left Ventricle: The left ventricle is normal in size, with an end-diastolic diameter of 6.2 cm, and an end-systolic diameter of 4.5 cm. LV wall thickness is normal, with the septum measuring 1.5 cm and the posterior wall measuring 1.7 cm across. Relative   wall thickness was increased at 0.55, and the LV mass index was increased at 229.0 g/m2 consistent with concentric left ventricular hypertrophy. Global left ventricular systolic function appears low normal to mildly depressed. Visually estimated   ejection fraction is 50-55%. The LV Doppler derived stroke volume equals 52.0 ccs.   The E/e'(lat) is 9.  This along with the following abnormalities (ERIC = 49.98 and LVMI = 229.00) suggests significant diastolic dysfunction.     Right Atrium: The right atrium is normal in size, measuring 5.3 cm in length in the apical view.     Right Ventricle: The right ventricle is normal in size measuring 3.0 cm at the base in the apical right ventricle-focused view. Global right ventricular systolic function appears normal. The estimated PA systolic pressure is 9 mmHg.     Aortic Valve:  The aortic valve is normal in structure with normal leaflet mobility. The aortic valve is tri-leaflet in structure.     Mitral Valve:  The mitral valve is normal in structure with normal  leaflet mobility.     Tricuspid Valve:  The tricuspid valve is normal in structure with normal leaflet mobility. There is trivial tricuspid regurgitation.     Pulmonary Valve:  The pulmonic valve is not well seen.     IVC: IVC is normal in size and collapses > 50% with a sniff, suggesting normal right atrial pressure of 3 mmHg.     Atrial Septum: The atrial septum is intact.     Intracavitary: There is no evidence of pericardial effusion, intracavity mass, thrombi, or vegetation.         CONCLUSIONS     1 - Low normal to mildly depressed left ventricular systolic function (EF 50-55%).     2 - Concentric hypertrophy.     3 - Severe left atrial enlargement.     4 - Left ventricular diastolic dysfunction.     5 - Normal right ventricular systolic function .     6 - The estimated PA systolic pressure is 9 mmHg.         This document has been electronically    SIGNED BY: Milagros Gross MD On: 09/18/2015 16:15    and Transthoracic echo (TTE) complete (Cupid Only):   Results for orders placed or performed during the hospital encounter of 12/21/21   Echo   Result Value Ref Range    Ascending aorta 3.30 cm    STJ 3.27 cm    AV mean gradient 4 mmHg    Ao peak son 1.17 m/s    Ao VTI 25.29 cm    IVS 1.61 (A) 0.6 - 1.1 cm    LA size 5.11 cm    Left Atrium Major Axis 7.34 cm    Left Atrium Minor Axis 6.11 cm    LVIDd 6.60 (A) 3.5 - 6.0 cm    LVIDs 5.24 (A) 2.1 - 4.0 cm    LVOT diameter 2.89 cm    LVOT peak VTI 17.22 cm    Posterior Wall 1.66 (A) 0.6 - 1.1 cm    MV Peak A Son 1.03 m/s    E wave deceleration time 128.00 msec    MV Peak E Son 0.75 m/s    RA Major Axis 5.72 cm    RA Width 3.45 cm    RVDD 3.28 cm    TAPSE 1.72 cm    TR Max Son 2.14 m/s    TDI LATERAL 0.04 m/s    LA WIDTH 4.32 cm    Ao root annulus 4.28 cm    PV PEAK VELOCITY 1.04 cm/s    MV stenosis pressure 1/2 time 37.12 ms    LV Diastolic Volume 223.81 mL    LV Systolic Volume 131.64 mL    LVOT peak son 0.79 m/s    LA volume (mod) 85.57 cm3    MV mean gradient 1  mmHg    MV peak gradient 4 mmHg    RV S' 9.54 cm/s    MV VTI 18.13 cm    LV LATERAL E/E' RATIO 18.75 m/s    FS 21 %    LA volume 125.13 cm3    LV mass 561.38 g    Left Ventricle Relative Wall Thickness 0.50 cm    AV valve area 4.46 cm2    AV Velocity Ratio 0.68     AV index (prosthetic) 0.68     MV valve area p 1/2 method 5.93 cm2    MV valve area by continuity eq 6.23 cm2    E/A ratio 0.73     LVOT area 6.6 cm2    LVOT stroke volume 112.90 cm3    AV peak gradient 5 mmHg    LV Systolic Volume Index 53.5 mL/m2    LV Diastolic Volume Index 90.98 mL/m2    LA Volume Index 50.9 mL/m2    LV Mass Index 228 g/m2    Triscuspid Valve Regurgitation Peak Gradient 18 mmHg    LA Volume Index (Mod) 34.8 mL/m2    BSA 2.53 m2    Right Atrial Pressure (from IVC) 3 mmHg    QEF 34 %    TV rest pulmonary artery pressure 21 mmHg    Narrative    · The left ventricle is moderately enlarged with mild concentric   hypertrophy and  · The quantitatively derived ejection fraction is 34%.  · Severe left atrial enlargement.  · Normal right ventricular size with normal right ventricular systolic   function.  · Normal central venous pressure (3 mmHg).  · The estimated PA systolic pressure is 21 mmHg.  · No vegetations per surface echo.      , EKG: reviewed, and X-Ray: CXR: X-Ray Chest 1 View (CXR): No results found for this visit on 06/13/22. and X-Ray Chest PA and Lateral (CXR): No results found for this visit on 06/13/22.   RN

## 2022-06-30 ENCOUNTER — APPOINTMENT (OUTPATIENT)
Dept: CARDIOLOGY | Facility: CLINIC | Age: 77
End: 2022-06-30

## 2022-06-30 ENCOUNTER — NON-APPOINTMENT (OUTPATIENT)
Age: 77
End: 2022-06-30

## 2022-06-30 VITALS
SYSTOLIC BLOOD PRESSURE: 99 MMHG | HEART RATE: 92 BPM | WEIGHT: 193 LBS | RESPIRATION RATE: 17 BRPM | TEMPERATURE: 97.2 F | BODY MASS INDEX: 26.14 KG/M2 | DIASTOLIC BLOOD PRESSURE: 66 MMHG | HEIGHT: 72 IN | OXYGEN SATURATION: 94 %

## 2022-06-30 LAB
INR PPP: 2.2 RATIO
POCT-PROTHROMBIN TIME: 22.1 SECS
QUALITY CONTROL: YES

## 2022-06-30 PROCEDURE — 99211 OFF/OP EST MAY X REQ PHY/QHP: CPT | Mod: 25

## 2022-06-30 PROCEDURE — 85610 PROTHROMBIN TIME: CPT | Mod: QW

## 2022-06-30 PROCEDURE — 93000 ELECTROCARDIOGRAM COMPLETE: CPT

## 2022-06-30 PROCEDURE — 99214 OFFICE O/P EST MOD 30 MIN: CPT

## 2022-07-05 ENCOUNTER — RESULT REVIEW (OUTPATIENT)
Age: 77
End: 2022-07-05

## 2022-07-05 ENCOUNTER — APPOINTMENT (OUTPATIENT)
Dept: RADIOLOGY | Facility: HOSPITAL | Age: 77
End: 2022-07-05

## 2022-07-05 ENCOUNTER — OUTPATIENT (OUTPATIENT)
Dept: OUTPATIENT SERVICES | Facility: HOSPITAL | Age: 77
LOS: 1 days | End: 2022-07-05
Payer: MEDICARE

## 2022-07-05 DIAGNOSIS — Z98.890 OTHER SPECIFIED POSTPROCEDURAL STATES: Chronic | ICD-10-CM

## 2022-07-05 DIAGNOSIS — Z00.8 ENCOUNTER FOR OTHER GENERAL EXAMINATION: ICD-10-CM

## 2022-07-05 LAB
ALBUMIN SERPL ELPH-MCNC: 3.8 G/DL
ALP BLD-CCNC: 110 U/L
ALT SERPL-CCNC: 40 U/L
ANION GAP SERPL CALC-SCNC: 11 MMOL/L
AST SERPL-CCNC: 34 U/L
BILIRUB SERPL-MCNC: 0.9 MG/DL
BUN SERPL-MCNC: 30 MG/DL
CALCIUM SERPL-MCNC: 9.1 MG/DL
CHLORIDE SERPL-SCNC: 104 MMOL/L
CHOLEST SERPL-MCNC: 115 MG/DL
CO2 SERPL-SCNC: 24 MMOL/L
CREAT SERPL-MCNC: 1.09 MG/DL
EGFR: 70 ML/MIN/1.73M2
GLUCOSE SERPL-MCNC: 99 MG/DL
HDLC SERPL-MCNC: 54 MG/DL
LDLC SERPL CALC-MCNC: 49 MG/DL
NONHDLC SERPL-MCNC: 61 MG/DL
NT-PROBNP SERPL-MCNC: 2278 PG/ML
POTASSIUM SERPL-SCNC: 4.4 MMOL/L
PROT SERPL-MCNC: 6.5 G/DL
SODIUM SERPL-SCNC: 140 MMOL/L
TRIGL SERPL-MCNC: 60 MG/DL

## 2022-07-05 PROCEDURE — 71046 X-RAY EXAM CHEST 2 VIEWS: CPT

## 2022-07-05 PROCEDURE — 71046 X-RAY EXAM CHEST 2 VIEWS: CPT | Mod: 26

## 2022-07-06 ENCOUNTER — NON-APPOINTMENT (OUTPATIENT)
Age: 77
End: 2022-07-06

## 2022-07-06 ENCOUNTER — RESULT REVIEW (OUTPATIENT)
Age: 77
End: 2022-07-06

## 2022-07-06 ENCOUNTER — APPOINTMENT (OUTPATIENT)
Dept: CT IMAGING | Facility: CLINIC | Age: 77
End: 2022-07-06

## 2022-07-06 ENCOUNTER — OUTPATIENT (OUTPATIENT)
Dept: OUTPATIENT SERVICES | Facility: HOSPITAL | Age: 77
LOS: 1 days | End: 2022-07-06
Payer: MEDICARE

## 2022-07-06 DIAGNOSIS — Z98.890 OTHER SPECIFIED POSTPROCEDURAL STATES: Chronic | ICD-10-CM

## 2022-07-06 DIAGNOSIS — R91.1 SOLITARY PULMONARY NODULE: ICD-10-CM

## 2022-07-06 PROCEDURE — 71250 CT THORAX DX C-: CPT

## 2022-07-06 PROCEDURE — 71250 CT THORAX DX C-: CPT | Mod: 26,MH

## 2022-07-06 RX ORDER — ASPIRIN 81 MG/1
81 TABLET, CHEWABLE ORAL
Refills: 0 | Status: DISCONTINUED | COMMUNITY
Start: 2022-06-06 | End: 2022-07-06

## 2022-07-06 NOTE — CARDIOLOGY SUMMARY
[de-identified] : 5/11/2022 normal sinus rhythm anteroseptal pattern anterior ST segment elevation \par 5/27/22 anterior mi AI lafb rsr pac [de-identified] : 5/20/22 EF .3  LVT 1x1.3 cm decreased RV function RV Enlargement MARCOS  [de-identified] : minimal viability in infarcted territories [de-identified] : 5/11/22 lad .7 diag .7 rca 1.00 circ normal

## 2022-07-06 NOTE — REASON FOR VISIT
[Symptom and Test Evaluation] : symptom and test evaluation [CV Risk Factors and Non-Cardiac Disease] : CV risk factors and non-cardiac disease [Coronary Artery Disease] : coronary artery disease [FreeTextEntry3] : Dr. Lopez [FreeTextEntry1] : John recently reports depression and  nausea. He  is undergoing psychiatric evaluations on medical therapy. He feels like he has slowed down and feels tired and short of breath. He usually swims 3 times per week. He has anxiety and depression. His wife has noted a distinct difference recently. His brother is undergoing cardiac stents. He underwent vaccination November 20 2021 with Moderna. He suffered a COVID illness with the omicron variant on Kimo graciela. The  PCR was positive after rapid was negative. He is being seen by GI service.   He comes today for clarification of  his overall cardiovascular risk. He was advised to undergo a complete cardiac evaluation. \par \par 5/27/22\par John comes today after a prolonged hospitalization for a cardiomyopathy coronary disease and shock. He was placed on anticoagulation for LV thrombus and we note a marked elevation in INR today at 5 and his warfarin is held.\par \par update 6/30/22\par John wishes to become more active even use a kayak although would restrict activities for time being given recent acute illness care coordinated with Dr. Yu and psychiatry notes hypotension blood pressure of 92/62 and heart rate of 60 mental depression and overall slowing of function and confusion

## 2022-07-06 NOTE — ASSESSMENT
[FreeTextEntry1] : EKG is of concern with some ST segment elevation in V2 and V3 with a QS pattern consistent with infarction.  We discussed etiology of dyspnea which may be on the basis of coronary disease or even myocarditis and even COVID or inflammatory changes. He is referred to emergency room and initial troponin is elevated 260 with a BNP of 4724 which confers high risk. We discussed the options including cardiac catheterization and angiography vs medical therapy. Care is coordinated with Ivon in the emergency room with consideration to urgent cath based upon signs and symptoms. Consider heparin aspirin Plavix beta-blocker statin echo and urgent cath.We discussed the pros and cons of plain treadmill stress testing nuclear stress testing and angiography including a sensitivity analysis.\par More than half of the face to face encounter of  60 minutes  was spent in counseling the patient with respect to an abnormal EKG and cardiovascular risk\par \par 5/27/22 addendum\par adjust warfarin. heart failure guidelines discussed. dc asa 5/31 CHF guidelines weight consider reintroduction of ACE ARB ARNI as per recommendations and guidelines. consider spironolactone FARXIGA as tolerated.\par \par 6/30/22\par will decrease metoprolol succinate to 50 mg daily restrict activities daily weights restrict salts anticoagulate\par \par Medical necessity\par This is a high encounter based upon two or more chronic illnesses with mild exacerbation requiring further management and evaluation.  \par \par discussed with wife at bedside

## 2022-07-15 ENCOUNTER — RX RENEWAL (OUTPATIENT)
Age: 77
End: 2022-07-15

## 2022-07-18 RX ORDER — METOPROLOL SUCCINATE 100 MG/1
100 TABLET, EXTENDED RELEASE ORAL
Qty: 30 | Refills: 0 | Status: DISCONTINUED | COMMUNITY
Start: 2022-06-06 | End: 2022-07-18

## 2022-07-21 ENCOUNTER — APPOINTMENT (OUTPATIENT)
Dept: CARDIOLOGY | Facility: CLINIC | Age: 77
End: 2022-07-21

## 2022-07-21 LAB
INR PPP: 1.8 RATIO
POCT-PROTHROMBIN TIME: 21.5 SECS
QUALITY CONTROL: YES

## 2022-07-21 PROCEDURE — 85610 PROTHROMBIN TIME: CPT | Mod: QW

## 2022-07-21 PROCEDURE — 99211 OFF/OP EST MAY X REQ PHY/QHP: CPT | Mod: 25

## 2022-07-21 NOTE — ED ADULT TRIAGE NOTE - CHIEF COMPLAINT QUOTE
CNM Prenatal Office Note  Subjective:  Sadie Chen is here for a return obstetrical visit. Today she is 33w1d weeks EGA. She is doing well, taking her PNV as directed, and has no complaints. She  does not have vaginal bleeding, n/v, syncope, or headaches. Pt does feel fetal movement regularly. Objective: Mother's Prenatal Vitals  BP: 107/61  Weight: 153 lb (69.4 kg)  Heart Rate: 88  Patient Position: Sitting  Prenatal Fetal Information  Fetal HR:   Movement: Present  Pt is A&Ox3, in no acute distress. Normocephalic, atraumatic. PERRL. Resp even and non-labored. Skin pink, warm & dry. Gravid abdomen. GARCIA's well. Gait steady. Assessment:    IUP at 33w1d wks      Diagnosis Orders   1. Encounter for supervision of other normal pregnancy in third trimester        2. 33 weeks gestation of pregnancy          Plan:  Pt counseled on balanced nutrition, adequate fluid intake, taking PNV daily, and exercise along with GHTN precautions, Kick count, and  labor  Continue with routine prenatal care.  surveillance not indicated  RTC in 2 wks for prenatal visit    MEDICATIONS:  No orders of the defined types were placed in this encounter. ORDERS:  No orders of the defined types were placed in this encounter. More than 50% of this 20 min visit was education and counseling. pt recently had urinary catheter while in [place]; reporting now decreasing urine output

## 2022-07-28 ENCOUNTER — APPOINTMENT (OUTPATIENT)
Dept: NUCLEAR MEDICINE | Facility: CLINIC | Age: 77
End: 2022-07-28

## 2022-07-28 ENCOUNTER — OUTPATIENT (OUTPATIENT)
Dept: OUTPATIENT SERVICES | Facility: HOSPITAL | Age: 77
LOS: 1 days | End: 2022-07-28
Payer: MEDICARE

## 2022-07-28 DIAGNOSIS — Z98.890 OTHER SPECIFIED POSTPROCEDURAL STATES: Chronic | ICD-10-CM

## 2022-07-28 DIAGNOSIS — Z00.8 ENCOUNTER FOR OTHER GENERAL EXAMINATION: ICD-10-CM

## 2022-07-28 PROCEDURE — A9552: CPT

## 2022-07-28 PROCEDURE — 78815 PET IMAGE W/CT SKULL-THIGH: CPT | Mod: 26,PI,MH

## 2022-07-28 PROCEDURE — 78815 PET IMAGE W/CT SKULL-THIGH: CPT | Mod: MH

## 2022-07-29 ENCOUNTER — APPOINTMENT (OUTPATIENT)
Dept: CARDIOLOGY | Facility: CLINIC | Age: 77
End: 2022-07-29

## 2022-07-29 LAB
INR PPP: 2.3 RATIO
POCT-PROTHROMBIN TIME: 20.9 SECS
QUALITY CONTROL: YES

## 2022-07-29 PROCEDURE — 99211 OFF/OP EST MAY X REQ PHY/QHP: CPT | Mod: 25

## 2022-07-29 PROCEDURE — 85610 PROTHROMBIN TIME: CPT | Mod: QW

## 2022-08-05 ENCOUNTER — NON-APPOINTMENT (OUTPATIENT)
Age: 77
End: 2022-08-05

## 2022-08-08 ENCOUNTER — APPOINTMENT (OUTPATIENT)
Dept: THORACIC SURGERY | Facility: CLINIC | Age: 77
End: 2022-08-08

## 2022-08-08 ENCOUNTER — NON-APPOINTMENT (OUTPATIENT)
Age: 77
End: 2022-08-08

## 2022-08-08 VITALS
HEIGHT: 72 IN | HEART RATE: 84 BPM | RESPIRATION RATE: 18 BRPM | DIASTOLIC BLOOD PRESSURE: 59 MMHG | WEIGHT: 182 LBS | SYSTOLIC BLOOD PRESSURE: 96 MMHG | BODY MASS INDEX: 24.65 KG/M2

## 2022-08-08 PROCEDURE — 99205 OFFICE O/P NEW HI 60 MIN: CPT

## 2022-08-10 ENCOUNTER — APPOINTMENT (OUTPATIENT)
Dept: MRI IMAGING | Facility: CLINIC | Age: 77
End: 2022-08-10

## 2022-08-10 ENCOUNTER — OUTPATIENT (OUTPATIENT)
Dept: OUTPATIENT SERVICES | Facility: HOSPITAL | Age: 77
LOS: 1 days | End: 2022-08-10
Payer: MEDICARE

## 2022-08-10 DIAGNOSIS — Z00.8 ENCOUNTER FOR OTHER GENERAL EXAMINATION: ICD-10-CM

## 2022-08-10 DIAGNOSIS — Z98.890 OTHER SPECIFIED POSTPROCEDURAL STATES: Chronic | ICD-10-CM

## 2022-08-10 PROCEDURE — 74182 MRI ABDOMEN W/CONTRAST: CPT | Mod: MH

## 2022-08-10 PROCEDURE — A9585: CPT

## 2022-08-10 PROCEDURE — 74182 MRI ABDOMEN W/CONTRAST: CPT | Mod: 26,MH

## 2022-08-10 RX ORDER — QUETIAPINE FUMARATE 200 MG/1
1 TABLET, FILM COATED ORAL
Qty: 0 | Refills: 0 | DISCHARGE

## 2022-08-10 RX ORDER — MIRTAZAPINE 45 MG/1
1 TABLET, ORALLY DISINTEGRATING ORAL
Qty: 0 | Refills: 0 | DISCHARGE

## 2022-08-10 RX ORDER — VENLAFAXINE HCL 75 MG
150 CAPSULE, EXT RELEASE 24 HR ORAL
Qty: 0 | Refills: 0 | DISCHARGE

## 2022-08-11 ENCOUNTER — APPOINTMENT (OUTPATIENT)
Dept: INTERVENTIONAL RADIOLOGY/VASCULAR | Facility: CLINIC | Age: 77
End: 2022-08-11

## 2022-08-11 ENCOUNTER — TRANSCRIPTION ENCOUNTER (OUTPATIENT)
Age: 77
End: 2022-08-11

## 2022-08-11 VITALS — WEIGHT: 182 LBS | HEIGHT: 72 IN | BODY MASS INDEX: 24.65 KG/M2

## 2022-08-11 PROCEDURE — 99443: CPT | Mod: 95

## 2022-08-11 RX ORDER — LAMOTRIGINE 100 MG/1
100 TABLET ORAL DAILY
Refills: 0 | Status: COMPLETED | COMMUNITY
End: 2022-08-11

## 2022-08-11 RX ORDER — LAMOTRIGINE 200 MG/1
200 TABLET ORAL
Qty: 30 | Refills: 0 | Status: COMPLETED | COMMUNITY
Start: 2017-09-20 | End: 2022-08-11

## 2022-08-12 NOTE — ASSESSMENT
[FreeTextEntry1] : Mr. ELYSE MALAVE, 77 year old male, never smoker, w/ hx of bipolar, BPH, SCC of leg, CHF, CAD, hospitalized on May 11-17/2022 for acute MI s/p cardiac Cath with no intervention (LHC showed 100% occlusion for right coronary artery and 70% occlusion of left anterior descending artery. The arteries were not intervened upon due to viability study showing no viability in the areas supplied by the occluded arteries), cardiogenic shock, found to have new 1x 1.3cm LV thrombus on Plavix and Coumadin (Coumadin will be completed on 08/26/2022), who f/u with cardiologist and c/o SOB, CXR showed RUL nodule, PET/CT showed FDG avid RUL mass, referred by Dr. Nicholas Espinal. \par \par I have reviewed the patient's medical records and diagnostic images at time of this office consultation and have made the following recommendation:\par 1. CT chest, PET/CT and PFTs reviewed and explained to patient, RUL mass is suspicious for primary lung cancer, however, given patient recent MI, high risk for lung surgery, I recommended a CT guided bx for definitive diagnosis before surgical intervention. \par 2. Will f/u with MRI of abdomen for adrenal nodule. \par 3. Will discuss with Dr. Walden and Dr. Nicholas Espinal. \par \par I personally performed the services described in the documentation, reviewed the documentation recorded by the scribe in my presence and it accurately and completely records my words and actions.\par \par I, Love Lopez NP, am scribing for and the presence of ERNESTINA Mandujano, the following sections HISTORY OF PRESENT ILLNESS, PAST MEDICAL/FAMILY/SOCIAL HISTORY; REVIEW OF SYSTEMS; VITAL SIGNS; PHYSICAL EXAM; DISPOSITION.\par

## 2022-08-12 NOTE — HISTORY OF PRESENT ILLNESS
[FreeTextEntry1] : Mr. ELYSE MALAVE, 77 year old male, never smoker, w/ hx of bipolar, BPH, SCC of leg, CHF, CAD, hospitalized on May 11-17/2022 for acute MI s/p cardiac Cath with no intervention (LHC showed 100% occlusion for right coronary artery and 70% occlusion of left anterior descending artery. The arteries were not intervened upon due to viability study showing no viability in the areas supplied by the occluded arteries), cardiogenic shock, found to have new 1x 1.3cm LV thrombus on Plavix and Coumadin (Coumadin will be completed on 08/26/2022), who f/u with cardiologist and c/o SOB, CXR showed RUL nodule, PET/CT showed FDG avid RUL mass, referred by Dr. Nicholas Espinal. \par \par CXR on 07/05/2022: for SOB\par -  Ill-defined nodular opacity seen in the right upper lobe for which further evaluation with CT scan imaging chest is recommended.\par \par CT chest on 07/06/2022: \par - A 3.5 x 2 cm part solid mass containing 2.8 cm solid component is noted in the right upper lobe. \par - 0.3 cm ill-defined nodule is noted in the left upper lobe.\par - Few linear opacities representing atelectasis are noted in both lungs. \par - Small bilateral pleural effusions are noted.\par \par PET/CT on 07/28/2022:\par - Mildly FDG avid mixed solid and groundglass right upper lung mass measuring 3.5 x 2 cm with SUV 4.8 (image 82). A 0.3 cm left upper lung nodule (image 75) is too small to characterize.\par - Small bilateral pleural effusion not significantly changed from comparison CT with minimal FDG avidity with SUV 2.2. \par - Large photopenic 8.4 x 7.7 cm low-attenuation mass along the inferoposterior aspect of the right kidney, compatible with cyst.\par - Nonspecific mildly FDG avid low-attenuation left adrenal nodule, possibly an adenoma. This may be further evaluated with contrast-enhanced CT or MRI.\par - Heterogeneous FDG activity in the soft tissue associated with surgical clips overlying the symphysis pubis, probably related from prior procedure. \par \par PFTs on 08/03/2022: FEV1 2.41 (70%), DLCO 62%. \par \par Patient is here today for CT surgery consultation. Patient denies shortness of breath, cough, chest pain, fever, chills, loss of appetite, weight loss, or hemoptysis.

## 2022-08-22 ENCOUNTER — INPATIENT (INPATIENT)
Facility: HOSPITAL | Age: 77
LOS: 3 days | Discharge: HOME | End: 2022-08-26
Attending: INTERNAL MEDICINE | Admitting: INTERNAL MEDICINE

## 2022-08-22 ENCOUNTER — APPOINTMENT (OUTPATIENT)
Dept: THORACIC SURGERY | Facility: CLINIC | Age: 77
End: 2022-08-22

## 2022-08-22 VITALS
HEART RATE: 81 BPM | WEIGHT: 183 LBS | DIASTOLIC BLOOD PRESSURE: 61 MMHG | OXYGEN SATURATION: 99 % | SYSTOLIC BLOOD PRESSURE: 95 MMHG | HEIGHT: 72 IN | RESPIRATION RATE: 16 BRPM | BODY MASS INDEX: 24.79 KG/M2

## 2022-08-22 VITALS
SYSTOLIC BLOOD PRESSURE: 90 MMHG | HEIGHT: 72 IN | RESPIRATION RATE: 18 BRPM | TEMPERATURE: 98 F | HEART RATE: 81 BPM | OXYGEN SATURATION: 99 % | DIASTOLIC BLOOD PRESSURE: 70 MMHG

## 2022-08-22 DIAGNOSIS — R91.8 OTHER NONSPECIFIC ABNORMAL FINDING OF LUNG FIELD: ICD-10-CM

## 2022-08-22 DIAGNOSIS — Z98.890 OTHER SPECIFIED POSTPROCEDURAL STATES: Chronic | ICD-10-CM

## 2022-08-22 DIAGNOSIS — R04.2 HEMOPTYSIS: ICD-10-CM

## 2022-08-22 DIAGNOSIS — Z29.9 ENCOUNTER FOR PROPHYLACTIC MEASURES, UNSPECIFIED: ICD-10-CM

## 2022-08-22 DIAGNOSIS — Z78.9 OTHER SPECIFIED HEALTH STATUS: ICD-10-CM

## 2022-08-22 DIAGNOSIS — I23.6 THROMBOSIS OF ATRIUM, AURICULAR APPENDAGE, AND VENTRICLE AS CURRENT COMPLICATIONS FOLLOWING ACUTE MYOCARDIAL INFARCTION: ICD-10-CM

## 2022-08-22 DIAGNOSIS — F32.9 MAJOR DEPRESSIVE DISORDER, SINGLE EPISODE, UNSPECIFIED: ICD-10-CM

## 2022-08-22 DIAGNOSIS — E78.5 HYPERLIPIDEMIA, UNSPECIFIED: ICD-10-CM

## 2022-08-22 DIAGNOSIS — I50.22 CHRONIC SYSTOLIC (CONGESTIVE) HEART FAILURE: ICD-10-CM

## 2022-08-22 PROBLEM — R33.9 RETENTION OF URINE, UNSPECIFIED: Chronic | Status: ACTIVE | Noted: 2021-05-08

## 2022-08-22 PROBLEM — I25.10 ATHEROSCLEROTIC HEART DISEASE OF NATIVE CORONARY ARTERY WITHOUT ANGINA PECTORIS: Chronic | Status: ACTIVE | Noted: 2022-05-20

## 2022-08-22 PROBLEM — K59.00 CONSTIPATION, UNSPECIFIED: Chronic | Status: ACTIVE | Noted: 2021-05-08

## 2022-08-22 LAB
ALBUMIN SERPL ELPH-MCNC: 4 G/DL — SIGNIFICANT CHANGE UP (ref 3.3–5)
ALP SERPL-CCNC: 129 U/L — HIGH (ref 40–120)
ALT FLD-CCNC: 66 U/L — HIGH (ref 4–41)
ANION GAP SERPL CALC-SCNC: 12 MMOL/L — SIGNIFICANT CHANGE UP (ref 7–14)
APTT BLD: 43.3 SEC — HIGH (ref 27–36.3)
AST SERPL-CCNC: 65 U/L — HIGH (ref 4–40)
BASE EXCESS BLDV CALC-SCNC: -0.6 MMOL/L — SIGNIFICANT CHANGE UP (ref -2–3)
BASOPHILS # BLD AUTO: 0.05 K/UL — SIGNIFICANT CHANGE UP (ref 0–0.2)
BASOPHILS NFR BLD AUTO: 0.8 % — SIGNIFICANT CHANGE UP (ref 0–2)
BILIRUB SERPL-MCNC: 1.3 MG/DL — HIGH (ref 0.2–1.2)
BLD GP AB SCN SERPL QL: NEGATIVE — SIGNIFICANT CHANGE UP
BLOOD GAS VENOUS COMPREHENSIVE RESULT: SIGNIFICANT CHANGE UP
BUN SERPL-MCNC: 33 MG/DL — HIGH (ref 7–23)
CALCIUM SERPL-MCNC: 9.3 MG/DL — SIGNIFICANT CHANGE UP (ref 8.4–10.5)
CHLORIDE BLDV-SCNC: 104 MMOL/L — SIGNIFICANT CHANGE UP (ref 96–108)
CHLORIDE SERPL-SCNC: 104 MMOL/L — SIGNIFICANT CHANGE UP (ref 98–107)
CO2 BLDV-SCNC: 26.8 MMOL/L — HIGH (ref 22–26)
CO2 SERPL-SCNC: 21 MMOL/L — LOW (ref 22–31)
CREAT SERPL-MCNC: 1.25 MG/DL — SIGNIFICANT CHANGE UP (ref 0.5–1.3)
EGFR: 59 ML/MIN/1.73M2 — LOW
EOSINOPHIL # BLD AUTO: 0.12 K/UL — SIGNIFICANT CHANGE UP (ref 0–0.5)
EOSINOPHIL NFR BLD AUTO: 1.8 % — SIGNIFICANT CHANGE UP (ref 0–6)
GAS PNL BLDV: 134 MMOL/L — LOW (ref 136–145)
GLUCOSE BLDV-MCNC: 98 MG/DL — SIGNIFICANT CHANGE UP (ref 70–99)
GLUCOSE SERPL-MCNC: 100 MG/DL — HIGH (ref 70–99)
HCO3 BLDV-SCNC: 25 MMOL/L — SIGNIFICANT CHANGE UP (ref 22–29)
HCT VFR BLD CALC: 41.2 % — SIGNIFICANT CHANGE UP (ref 39–50)
HCT VFR BLDA CALC: 41 % — SIGNIFICANT CHANGE UP (ref 39–51)
HGB BLD CALC-MCNC: 13.6 G/DL — SIGNIFICANT CHANGE UP (ref 13–17)
HGB BLD-MCNC: 13.4 G/DL — SIGNIFICANT CHANGE UP (ref 13–17)
IANC: 3.77 K/UL — SIGNIFICANT CHANGE UP (ref 1.8–7.4)
IMM GRANULOCYTES NFR BLD AUTO: 0.3 % — SIGNIFICANT CHANGE UP (ref 0–1.5)
INR BLD: 4.45 RATIO — HIGH (ref 0.88–1.16)
LACTATE BLDV-MCNC: 1.4 MMOL/L — SIGNIFICANT CHANGE UP (ref 0.5–2)
LYMPHOCYTES # BLD AUTO: 1.77 K/UL — SIGNIFICANT CHANGE UP (ref 1–3.3)
LYMPHOCYTES # BLD AUTO: 27.3 % — SIGNIFICANT CHANGE UP (ref 13–44)
MCHC RBC-ENTMCNC: 31.3 PG — SIGNIFICANT CHANGE UP (ref 27–34)
MCHC RBC-ENTMCNC: 32.5 GM/DL — SIGNIFICANT CHANGE UP (ref 32–36)
MCV RBC AUTO: 96.3 FL — SIGNIFICANT CHANGE UP (ref 80–100)
MONOCYTES # BLD AUTO: 0.76 K/UL — SIGNIFICANT CHANGE UP (ref 0–0.9)
MONOCYTES NFR BLD AUTO: 11.7 % — SIGNIFICANT CHANGE UP (ref 2–14)
NEUTROPHILS # BLD AUTO: 3.77 K/UL — SIGNIFICANT CHANGE UP (ref 1.8–7.4)
NEUTROPHILS NFR BLD AUTO: 58.1 % — SIGNIFICANT CHANGE UP (ref 43–77)
NRBC # BLD: 0 /100 WBCS — SIGNIFICANT CHANGE UP (ref 0–0)
NRBC # FLD: 0 K/UL — SIGNIFICANT CHANGE UP (ref 0–0)
PCO2 BLDV: 46 MMHG — SIGNIFICANT CHANGE UP (ref 42–55)
PH BLDV: 7.35 — SIGNIFICANT CHANGE UP (ref 7.32–7.43)
PLATELET # BLD AUTO: 206 K/UL — SIGNIFICANT CHANGE UP (ref 150–400)
PO2 BLDV: <20 MMHG — SIGNIFICANT CHANGE UP
POTASSIUM BLDV-SCNC: 4.6 MMOL/L — SIGNIFICANT CHANGE UP (ref 3.5–5.1)
POTASSIUM SERPL-MCNC: 4.7 MMOL/L — SIGNIFICANT CHANGE UP (ref 3.5–5.3)
POTASSIUM SERPL-SCNC: 4.7 MMOL/L — SIGNIFICANT CHANGE UP (ref 3.5–5.3)
PROT SERPL-MCNC: 7.1 G/DL — SIGNIFICANT CHANGE UP (ref 6–8.3)
PROTHROM AB SERPL-ACNC: 52.4 SEC — HIGH (ref 10.5–13.4)
RBC # BLD: 4.28 M/UL — SIGNIFICANT CHANGE UP (ref 4.2–5.8)
RBC # FLD: 16.1 % — HIGH (ref 10.3–14.5)
RH IG SCN BLD-IMP: POSITIVE — SIGNIFICANT CHANGE UP
SAO2 % BLDV: 15.6 % — SIGNIFICANT CHANGE UP
SARS-COV-2 RNA SPEC QL NAA+PROBE: SIGNIFICANT CHANGE UP
SODIUM SERPL-SCNC: 137 MMOL/L — SIGNIFICANT CHANGE UP (ref 135–145)
WBC # BLD: 6.49 K/UL — SIGNIFICANT CHANGE UP (ref 3.8–10.5)
WBC # FLD AUTO: 6.49 K/UL — SIGNIFICANT CHANGE UP (ref 3.8–10.5)

## 2022-08-22 PROCEDURE — 99214 OFFICE O/P EST MOD 30 MIN: CPT

## 2022-08-22 PROCEDURE — 71046 X-RAY EXAM CHEST 2 VIEWS: CPT | Mod: 26

## 2022-08-22 PROCEDURE — 99223 1ST HOSP IP/OBS HIGH 75: CPT

## 2022-08-22 PROCEDURE — 99222 1ST HOSP IP/OBS MODERATE 55: CPT | Mod: FS

## 2022-08-22 PROCEDURE — 99285 EMERGENCY DEPT VISIT HI MDM: CPT | Mod: GC

## 2022-08-22 RX ORDER — MIRTAZAPINE 45 MG/1
15 TABLET, ORALLY DISINTEGRATING ORAL AT BEDTIME
Refills: 0 | Status: DISCONTINUED | OUTPATIENT
Start: 2022-08-22 | End: 2022-08-26

## 2022-08-22 RX ORDER — LOVASTATIN 20 MG
1 TABLET ORAL
Qty: 0 | Refills: 0 | DISCHARGE

## 2022-08-22 RX ORDER — METOPROLOL TARTRATE 50 MG
0 TABLET ORAL
Qty: 0 | Refills: 0 | DISCHARGE

## 2022-08-22 RX ORDER — FUROSEMIDE 40 MG
40 TABLET ORAL
Refills: 0 | Status: DISCONTINUED | OUTPATIENT
Start: 2022-08-22 | End: 2022-08-26

## 2022-08-22 RX ORDER — ACETAMINOPHEN 500 MG
650 TABLET ORAL EVERY 6 HOURS
Refills: 0 | Status: DISCONTINUED | OUTPATIENT
Start: 2022-08-22 | End: 2022-08-26

## 2022-08-22 RX ORDER — LANOLIN ALCOHOL/MO/W.PET/CERES
3 CREAM (GRAM) TOPICAL AT BEDTIME
Refills: 0 | Status: DISCONTINUED | OUTPATIENT
Start: 2022-08-22 | End: 2022-08-26

## 2022-08-22 RX ORDER — CLONAZEPAM 1 MG
0 TABLET ORAL
Qty: 0 | Refills: 0 | DISCHARGE

## 2022-08-22 RX ORDER — METOPROLOL TARTRATE 50 MG
25 TABLET ORAL DAILY
Refills: 0 | Status: DISCONTINUED | OUTPATIENT
Start: 2022-08-22 | End: 2022-08-26

## 2022-08-22 RX ORDER — ATORVASTATIN CALCIUM 80 MG/1
80 TABLET, FILM COATED ORAL AT BEDTIME
Refills: 0 | Status: DISCONTINUED | OUTPATIENT
Start: 2022-08-22 | End: 2022-08-26

## 2022-08-22 RX ORDER — VENLAFAXINE HCL 75 MG
150 CAPSULE, EXT RELEASE 24 HR ORAL DAILY
Refills: 0 | Status: DISCONTINUED | OUTPATIENT
Start: 2022-08-22 | End: 2022-08-26

## 2022-08-22 RX ORDER — SODIUM CHLORIDE 9 MG/ML
1000 INJECTION INTRAMUSCULAR; INTRAVENOUS; SUBCUTANEOUS ONCE
Refills: 0 | Status: DISCONTINUED | OUTPATIENT
Start: 2022-08-22 | End: 2022-08-22

## 2022-08-22 RX ORDER — TAMSULOSIN HYDROCHLORIDE 0.4 MG/1
0.4 CAPSULE ORAL AT BEDTIME
Refills: 0 | Status: DISCONTINUED | OUTPATIENT
Start: 2022-08-22 | End: 2022-08-26

## 2022-08-22 RX ORDER — TAMSULOSIN HYDROCHLORIDE 0.4 MG/1
1 CAPSULE ORAL
Qty: 0 | Refills: 0 | DISCHARGE

## 2022-08-22 RX ORDER — METOPROLOL TARTRATE 50 MG
25 TABLET ORAL DAILY
Refills: 0 | Status: DISCONTINUED | OUTPATIENT
Start: 2022-08-22 | End: 2022-08-22

## 2022-08-22 RX ADMIN — MIRTAZAPINE 15 MILLIGRAM(S): 45 TABLET, ORALLY DISINTEGRATING ORAL at 22:27

## 2022-08-22 RX ADMIN — ATORVASTATIN CALCIUM 80 MILLIGRAM(S): 80 TABLET, FILM COATED ORAL at 22:26

## 2022-08-22 RX ADMIN — TAMSULOSIN HYDROCHLORIDE 0.4 MILLIGRAM(S): 0.4 CAPSULE ORAL at 22:26

## 2022-08-22 NOTE — ED ADULT NURSE REASSESSMENT NOTE - NS ED NURSE REASSESS COMMENT FT1
Break coverage RN: Pt A&Ox4 resting on stretcher. Respirations even and unlabored, sating 100% on RA. IV site patent. Pt well appearing, offers no complaints at this time. NAD noted. Medication given as per eMAR. Pt tolerated well. Bed in lowest position safety maintained.

## 2022-08-22 NOTE — H&P ADULT - PROBLEM SELECTOR PLAN 6
Assessment:  - patient has a R sided upper lung mass known and being worked up outpatient  - nuclear test showing avid signals from mass, concerning for malignancy    Plan:  - would discuss with cardiology if warfarin can be stopped early  - can consider getting IR biopsy while admitted when INR is back to normal and anticoagulation is no longer needed  - lung mass work up can also be done outpatient, should not delay discharge

## 2022-08-22 NOTE — H&P ADULT - PROBLEM SELECTOR PLAN 1
Assessment:  - patient presented for new onset hemoptysis, reported only minimal bleeding at home, no large volume bleeding  - found to have elevated INR of 4 with lung mass  - possible source of bleed is from lung mass in the setting of supratherapeutic INR  - patient is hemodynamically stable, also Hb has been stable at 13.4    Plan:  - monitor for bleeding closely  - hold warfarin for now, trend INR closely  - no need for Vitamin K reversal as the patient is no longer having hemoptysis and bleeding described is considered minimal, hb also stable  - will need to discuss with cardiology to see if warfarin can be stopped a few days earlier

## 2022-08-22 NOTE — H&P ADULT - NSHPLABSRESULTS_GEN_ALL_CORE
13.4   6.49  )-----------( 206      ( 22 Aug 2022 14:00 )             41.2       08-22    137  |  104  |  33<H>  ----------------------------<  100<H>  4.7   |  21<L>  |  1.25    Ca    9.3      22 Aug 2022 14:00    TPro  7.1  /  Alb  4.0  /  TBili  1.3<H>  /  DBili  x   /  AST  65<H>  /  ALT  66<H>  /  AlkPhos  129<H>  08-22            PT/INR - ( 22 Aug 2022 14:00 )   PT: 52.4 sec;   INR: 4.45 ratio         PTT - ( 22 Aug 2022 14:00 )  PTT:43.3 sec    14:00 - VBG - pH: 7.35  | pCO2: 46    | pO2: <20   | Lactate: 1.4

## 2022-08-22 NOTE — ED PROVIDER NOTE - NSICDXFAMILYHX_GEN_ALL_CORE_FT
FAMILY HISTORY:  No pertinent family history in first degree relatives    Mother  Still living? No  Family history of depression, Age at diagnosis: Age Unknown

## 2022-08-22 NOTE — CONSULT NOTE ADULT - ASSESSMENT
76yo M with h/o MI, LV thrombus and new RUL lung mass presents with concerns for hemoptysis, INR supratherapeutic

## 2022-08-22 NOTE — CONSULT NOTE ADULT - SUBJECTIVE AND OBJECTIVE BOX
HPI:  Mr. ELYSE MALAVE, 77 year old male, never smoker, w/ hx of bipolar, BPH, SCC of leg, CHF, CAD, hospitalized on May 11-17/2022 for acute MI s/p cardiac Cath with no intervention (LHC showed 100% occlusion for right coronary artery and 70% occlusion of left anterior descending artery. The arteries were not intervened upon due to viability study showing no viability in the areas supplied by the occluded arteries), cardiogenic shock, found to have new 1x 1.3cm LV thrombus on Plavix and Coumadin (Coumadin will be completed on 08/26/2022), who f/u with cardiologist and c/o SOB, CXR showed RUL nodule, PET/CT showed FDG avid RUL mass. Pt saw Dr Padilla in Thoracic office on 8/12/22 for surgical consultation of RUL mass. Pt was recommended to obtain an IR core mass biopsy for diagnosis. Pt presents to [facility] ER today and states that 1 time yesterday and once again this morning he coughed up a "glob" of phlegm with a streak of bright red blood in it. Pt also states that he had a brief episode of SOB yesterday that resolved on it's own. Pt states he was able to carry on his usual activities this am and went for a 25min walk without any issues or symptoms. No further episodes of bleeding. Pt/wife state that he is scheduled to stop taking Coumadin this Friday for good and is to FU with his Cardiologist this week to discuss the Plavix and when it can be held so that the IR biopsy can be scheduled. Pt currently very comfortable in ER, in no distress. Off oxygen. Denies HA, dizziness, CP, current SOB, abd pain, n/v/d or LE edema.         PAST MEDICAL & SURGICAL HISTORY:  BPH (benign prostatic hyperplasia)      Bipolar disorder      Depression      Anxiety      Umbilical hernia, incarcerated      Inguinal hernia of right side without obstruction or gangrene      Depression      Constipation      Urinary retention      CAD (coronary artery disease)      History of arthroscopy of knee  Right, 1987      History of tonsillectomy  1952      History of hernia repair          REVIEW OF SYSTEMS    Negative except for what's noted above.     MEDICATIONS  (STANDING):    MEDICATIONS  (PRN):      Allergies    No Known Allergies    Intolerances        SOCIAL HISTORY:  , lives with wife  non smoker    FAMILY HISTORY:  Family history of depression (Mother)    No pertinent family history in first degree relatives        Vital Signs Last 24 Hrs  T(C): 36.6 (22 Aug 2022 12:47), Max: 36.6 (22 Aug 2022 12:47)  T(F): 97.8 (22 Aug 2022 12:47), Max: 97.8 (22 Aug 2022 12:47)  HR: 76 (22 Aug 2022 14:49) (76 - 81)  BP: 94/66 (22 Aug 2022 14:49) (90/70 - 94/66)  BP(mean): --  RR: 18 (22 Aug 2022 14:49) (18 - 18)  SpO2: 100% (22 Aug 2022 14:49) (99% - 100%)    Parameters below as of 22 Aug 2022 14:49  Patient On (Oxygen Delivery Method): room air        PHYSICAL EXAM:    General: WN/WD NAD  Neurology: A&Ox3, nonfocal, GARCIA x 4  Eyes: PERRLA/ EOMI, Gross vision intact  ENT/Neck: Neck supple, trachea midline, No JVD, Gross hearing intact  Respiratory: CTA B/L, No wheezing, rales, rhonchi  CV: RRR  Abdominal: Soft, NT, ND +BS,   Extremities: No edema, + peripheral pulses  Skin: No Rashes, Hematoma        LABS:                        13.4   6.49  )-----------( 206      ( 22 Aug 2022 14:00 )             41.2     08-22    137  |  104  |  33<H>  ----------------------------<  100<H>  4.7   |  21<L>  |  1.25    Ca    9.3      22 Aug 2022 14:00    TPro  7.1  /  Alb  4.0  /  TBili  1.3<H>  /  DBili  x   /  AST  65<H>  /  ALT  66<H>  /  AlkPhos  129<H>  08-22    PT/INR - ( 22 Aug 2022 14:00 )   PT: 52.4 sec;   INR: 4.45 ratio         PTT - ( 22 Aug 2022 14:00 )  PTT:43.3 sec      RADIOLOGY & ADDITIONAL STUDIES:  CXR essentially clear. No obvious acute findings.

## 2022-08-22 NOTE — ED PROVIDER NOTE - ATTENDING CONTRIBUTION TO CARE
jerome: pt with known lung mass on coumadin presents with hemoptysis. pt with normal vs, controlling his secretions. ambulating.  ct surgery involved as pt will need fine needle biopsy.  plan imaging and labs.  pt also on coumadin    I performed a history and physical exam of the patient and discussed their management with the resident and /or advanced care provider. I reviewed the resident and /or ACP's note and agree with the documented findings and plan of care. My medical decison making and observations are found above.

## 2022-08-22 NOTE — H&P ADULT - PROBLEM SELECTOR PLAN 8
Patient provided list of medications but no doses. [facility] pharmacy emailed. Day team to follow

## 2022-08-22 NOTE — H&P ADULT - ASSESSMENT
76 y/o M with pmhx of Depression, bipolar, BPH, hx of STEMI and cardiogenic shock in may 2022, presented to the ED for new onset hemoptysis. Found to have INR of 4 and lung mass. No high volume bleed noted. Hemodynamically stable. Admit for hemoptysis work up.

## 2022-08-22 NOTE — ED PROVIDER NOTE - PHYSICAL EXAMINATION
PHYSICAL EXAM:    GENERAL: NAD, lying in bed comfortably  HEAD:  Atraumatic, Normocephalic  EYES: EOMI, PERRLA, conjunctiva and sclera clear  ENT: No erythema/pallor/petechiae/lesions  NECK: Supple  LUNG: CTA b/l; no r/r/w  HEART: RRR, +S1/S2; No m/r/g  ABDOMEN: soft, NT/ND; BS audible   EXTREMITIES:  2+ Peripheral Pulses, brisk cap refill. No clubbing, cyanosis, or edema  NERVOUS SYSTEM:  AAOx3, speech clear. No sensory/motor deficits   SKIN: No rashes or lesions

## 2022-08-22 NOTE — H&P ADULT - PROBLEM SELECTOR PLAN 2
Assessment:  - patient found to have an LV thrombus on echo after his MI in 5/2022  - Echo 5/2022 - Left ventricular thrombus measuring 1.0 by 1.3 cm seen in LV apex.  - has been on warfarin and plavix, suppose to stop warfarin on 8/26/2022    Plan:  - will obtain repeat echo to see if LV thrombus is still present, may help decide if warfarin is still needed  - cardiology consult  - hold warfarin for now in the setting of bleeding as above

## 2022-08-22 NOTE — ED PROVIDER NOTE - OBJECTIVE STATEMENT
77 yr male PMHx Depression h/o NSTEMI with relative hypotension and LV thrombus on Warfarin.   Per detail, patient had 2 episodes of hemoptysis for past 1 day. Today reports mild SOB.   Denies epistaxis, skin bruising/ecchymosis, hematuria, candelaria, palpitation, chest pain, LOC.   Patient informs having a CT Chest in May in 2022 which reported a RUL zone nodule 3.5 x 2 cm in size. Is in follow up with Dr Layne CT-Thoracic Surgery, sent to hospital for needle biopsy.

## 2022-08-22 NOTE — CONSULT NOTE ADULT - NS ATTEND AMEND GEN_ALL_CORE FT
Patient seen and examined agree with above note as modified, where appropriate, by me. Elevated INR and mild hemoptysis. Will allow INR to drift if continued hemoptysis will need bronch.

## 2022-08-22 NOTE — ED ADULT NURSE NOTE - NSICDXPASTMEDICALHX_GEN_ALL_CORE_FT
PAST MEDICAL HISTORY:  Anxiety     Bipolar disorder     BPH (benign prostatic hyperplasia)     CAD (coronary artery disease)     Constipation     Depression     Depression     Inguinal hernia of right side without obstruction or gangrene     Umbilical hernia, incarcerated     Urinary retention

## 2022-08-22 NOTE — ED PROVIDER NOTE - CLINICAL SUMMARY MEDICAL DECISION MAKING FREE TEXT BOX
77 yr male PMHx Depression h/o NSTEMI with relative hypotension and LV thrombus on Warfarin with recent CT significant for RUL zone nodule. Per detail, patient had 2 episodes of hemoptysis for past 1 day. Today reports mild SOB. Sent by Dr Layne CT-Thoracic Surgery, for needle biopsy. VS wnl, PE wnl. Plan for labs, consult CT Thoracic surgery. 77 yr male PMHx Depression h/o NSTEMI with relative hypotension and LV thrombus on Warfarin with recent CT significant for RUL zone nodule. Per detail, patient had 2 episodes of hemoptysis for past 1 day. Today reports mild SOB. Sent by Dr Layne CT-Thoracic Surgery, for needle biopsy. VS wnl, PE wnl. Plan for labs, consult CT Thoracic surgery.    jerome: pt with known lung mass on coumadin presents with hemoptysis. pt with normal vs, controlling his secretions. ambulating.  ct surgery involved as pt will need fine needle biopsy.  plan imaging and labs.  pt also on coumadin

## 2022-08-22 NOTE — H&P ADULT - NSHPREVIEWOFSYSTEMS_GEN_ALL_CORE
REVIEW OF SYSTEMS:    CONSTITUTIONAL: No weakness, fevers or chills  EYES/ENT: No visual changes;  No dysphagia; No sore throat; No rhinorrhea; No sinus pain/pressure  NECK: No pain or stiffness  RESPIRATORY: No cough, wheezing, + hemoptysis; No shortness of breath  CARDIOVASCULAR: No chest pain or palpitations; No lower extremity edema  GASTROINTESTINAL: No abdominal or epigastric pain. No nausea, vomiting, or hematemesis; No diarrhea or constipation. No melena or hematochezia.  GENITOURINARY: No dysuria, frequency or hematuria  NEUROLOGICAL: No numbness or weakness  MSK: ambulates without assistance  SKIN: No itching, burning, rashes, or lesions   All other review of systems is negative unless indicated above.

## 2022-08-22 NOTE — ED ADULT TRIAGE NOTE - CHIEF COMPLAINT QUOTE
Patient has a nodule in his lung and has been coughing up blood since yesterday. pATIENT IS ON BLOOD THINNERS (cOUMADIN).

## 2022-08-22 NOTE — ED PROVIDER NOTE - NSICDXPASTMEDICALHX_GEN_ALL_CORE_FT
PAST MEDICAL HISTORY:  Anxiety     Bipolar disorder     BPH (benign prostatic hyperplasia)     CAD (coronary artery disease)     Constipation     Depression     Depression     Inguinal hernia of right side without obstruction or gangrene     Umbilical hernia, incarcerated     Urinary retention      06-Nov-2021 16:09

## 2022-08-22 NOTE — H&P ADULT - HISTORY OF PRESENT ILLNESS
This is a 78 y/o M with pmhx of Depression, bipolar, BPH, hx of STEMI and cardiogenic shock in may 2022, presented to the ED for new onset hemoptysis. The patient 2 days ago had an episode of light red sputum production. He then had another episode this morning. Both episodes no clots noted. No profuse bleeding with coughing. The patient had a small episode of SOB but resolved, he was able to exercise and swim this morning. The patient known to have a STEMI in 5/2022 for which he had 100% occlusion of RCA and 70% occlusion of LAD however was not treated with stents because viability study did not show salvageable heart damage, was managed medically. The patient also found to have a LV thrombus which is 1x1.3 cm in size. He was started on warfarin and also started on ASA/plavix for STEMI event. The patient also found to have a R lung nodule which is suspicious for cancer, is pending IR biopsy. He needs to wait until he finishes his warfarin treatment for the LV thrombus (which is planned to stop on Friday 8/26/2022).    Cardiologist: Dr. Walden  Puljackie: Dr. Espinal  CT surg for lung mass: Dr. Padilla

## 2022-08-22 NOTE — ED ADULT NURSE NOTE - OBJECTIVE STATEMENT
pt A&ox4, came to ED for bloody sputum. pt states he woke up this morning and coughed up bloody sputum one time. pt states he is on warfarin due to pmh of MI. at this time pt denies Chest pain and SOB. pt denies H/A, Dizziness, lightheadedness, and radiating chest pain. NSR on telemetry. breathing is spontaneous and unlabored. sating 99% on RA. bilateral pedal and radial pulses palpable and strong. right ac 20g IV placed Labs drawn and sent as per ordered. Bed in lowest position, call bell within reach, all other safety and comfort measures provided. awaiting labs results and further orders.

## 2022-08-22 NOTE — H&P ADULT - NSHPPHYSICALEXAM_GEN_ALL_CORE
PHYSICAL EXAM:  VITALS: Vital Signs Last 24 Hrs  T(C): 36.7 (22 Aug 2022 19:11), Max: 36.7 (22 Aug 2022 19:11)  T(F): 98 (22 Aug 2022 19:11), Max: 98 (22 Aug 2022 19:11)  HR: 75 (22 Aug 2022 19:11) (75 - 81)  BP: 90/77 (22 Aug 2022 19:11) (90/70 - 94/66)  BP(mean): --  RR: 18 (22 Aug 2022 19:11) (18 - 18)  SpO2: 99% (22 Aug 2022 19:11) (99% - 100%)    Parameters below as of 22 Aug 2022 19:11  Patient On (Oxygen Delivery Method): room air      GENERAL: NAD, well-developed, well-nourished  HEAD:  Atraumatic, Normocephalic  EYES: EOMI, PERRL, conjunctiva and sclera clear  ENT: Moist Mucus Membranes present, no ulcers appreciated  NECK: Supple, No JVD  CHEST/LUNG: Clear to auscultation bilaterally; No wheezes, rales or rhonchi, no accessory muscle use  HEART: Regular rate and rhythm; No murmurs, rubs, or gallops, (+)S1, S2  ABDOMEN: Soft, Nontender, Nondistended; Normal Bowel sounds   EXTREMITIES:  2+ Peripheral Pulses, No clubbing, cyanosis, or edema  PSYCH: normal mood and affect  NEUROLOGY: AAOx3, non-focal  SKIN: No rashes or lesions

## 2022-08-22 NOTE — ED PROVIDER NOTE - NSICDXPASTSURGICALHX_GEN_ALL_CORE_FT
PAST SURGICAL HISTORY:  History of arthroscopy of knee Right, 1987    History of hernia repair     History of tonsillectomy 1952

## 2022-08-22 NOTE — CONSULT NOTE ADULT - PROBLEM SELECTOR RECOMMENDATION 9
Above d/w Dr. Padilla  No acute signs of hemoptysis. Pt had some streaking of blood mixed with phlegm.   Last episode this am. Likely related to elevated INR while also on Plavix  No thoracic surgical intervention indicated.  Pt needs to follow up with his Cardiologist and then have IR core biopsy of mass done  Pt should FU with Dr. Padilla 1-2 weeks after biopsy completed.   Will defer need to admit to ER team. If pt needs admission, recommend admit to medicine.   Above d/w ER attending and pt/pt's wife.

## 2022-08-22 NOTE — ED PROVIDER NOTE - PROGRESS NOTE DETAILS
Deven COLON: discussed labs with patient/family. INR elevated to 4.5. Will pause Warfarin until correction. CT Thoracics on board and will follow. No acute intervention at this time.

## 2022-08-22 NOTE — H&P ADULT - PROBLEM SELECTOR PLAN 3
- hx of STEMI in may, resulting EF 30% on echo  - euvolemic with no SOB symptoms, no LE swelling or pulmonary edema on exam  - c/w lasix, metoprolol for now with hold parameters

## 2022-08-22 NOTE — ED PROVIDER NOTE - NS ED ROS FT
CONSTITUTIONAL: No weakness, fevers or chills  EYES/ENT: No visual changes;  No vertigo or throat pain   NECK: No pain or stiffness  RESPIRATORY: c/o hemoptysis  CARDIOVASCULAR: No chest pain or palpitations  GASTROINTESTINAL: No abdominal or epigastric pain. No nausea, vomiting, or hematemesis; No diarrhea or constipation. No melena or hematochezia.  GENITOURINARY: No dysuria, frequency or hematuria  NEUROLOGICAL: No numbness or weakness  SKIN: No itching, burning, rashes, or lesions     All other review of systems is negative unless indicated above.

## 2022-08-23 DIAGNOSIS — D68.32 HEMORRHAGIC DISORDER DUE TO EXTRINSIC CIRCULATING ANTICOAGULANTS: ICD-10-CM

## 2022-08-23 LAB
ALBUMIN SERPL ELPH-MCNC: 3.6 G/DL — SIGNIFICANT CHANGE UP (ref 3.3–5)
ALP SERPL-CCNC: 118 U/L — SIGNIFICANT CHANGE UP (ref 40–120)
ALT FLD-CCNC: 50 U/L — HIGH (ref 4–41)
ANION GAP SERPL CALC-SCNC: 9 MMOL/L — SIGNIFICANT CHANGE UP (ref 7–14)
AST SERPL-CCNC: 45 U/L — HIGH (ref 4–40)
BASOPHILS # BLD AUTO: 0.03 K/UL — SIGNIFICANT CHANGE UP (ref 0–0.2)
BASOPHILS # BLD AUTO: 0.03 K/UL — SIGNIFICANT CHANGE UP (ref 0–0.2)
BASOPHILS NFR BLD AUTO: 0.4 % — SIGNIFICANT CHANGE UP (ref 0–2)
BASOPHILS NFR BLD AUTO: 0.4 % — SIGNIFICANT CHANGE UP (ref 0–2)
BILIRUB SERPL-MCNC: 1.2 MG/DL — SIGNIFICANT CHANGE UP (ref 0.2–1.2)
BUN SERPL-MCNC: 36 MG/DL — HIGH (ref 7–23)
CALCIUM SERPL-MCNC: 8.8 MG/DL — SIGNIFICANT CHANGE UP (ref 8.4–10.5)
CHLORIDE SERPL-SCNC: 107 MMOL/L — SIGNIFICANT CHANGE UP (ref 98–107)
CO2 SERPL-SCNC: 23 MMOL/L — SIGNIFICANT CHANGE UP (ref 22–31)
CREAT SERPL-MCNC: 1.2 MG/DL — SIGNIFICANT CHANGE UP (ref 0.5–1.3)
EGFR: 62 ML/MIN/1.73M2 — SIGNIFICANT CHANGE UP
EOSINOPHIL # BLD AUTO: 0.16 K/UL — SIGNIFICANT CHANGE UP (ref 0–0.5)
EOSINOPHIL # BLD AUTO: 0.18 K/UL — SIGNIFICANT CHANGE UP (ref 0–0.5)
EOSINOPHIL NFR BLD AUTO: 2.3 % — SIGNIFICANT CHANGE UP (ref 0–6)
EOSINOPHIL NFR BLD AUTO: 2.6 % — SIGNIFICANT CHANGE UP (ref 0–6)
GLUCOSE SERPL-MCNC: 96 MG/DL — SIGNIFICANT CHANGE UP (ref 70–99)
HCT VFR BLD CALC: 38.9 % — LOW (ref 39–50)
HCT VFR BLD CALC: 39.1 % — SIGNIFICANT CHANGE UP (ref 39–50)
HGB BLD-MCNC: 12.4 G/DL — LOW (ref 13–17)
HGB BLD-MCNC: 12.7 G/DL — LOW (ref 13–17)
IANC: 4.2 K/UL — SIGNIFICANT CHANGE UP (ref 1.8–7.4)
IANC: 4.35 K/UL — SIGNIFICANT CHANGE UP (ref 1.8–7.4)
IMM GRANULOCYTES NFR BLD AUTO: 0.3 % — SIGNIFICANT CHANGE UP (ref 0–1.5)
IMM GRANULOCYTES NFR BLD AUTO: 0.3 % — SIGNIFICANT CHANGE UP (ref 0–1.5)
INR BLD: 4.04 RATIO — HIGH (ref 0.88–1.16)
LYMPHOCYTES # BLD AUTO: 1.56 K/UL — SIGNIFICANT CHANGE UP (ref 1–3.3)
LYMPHOCYTES # BLD AUTO: 1.72 K/UL — SIGNIFICANT CHANGE UP (ref 1–3.3)
LYMPHOCYTES # BLD AUTO: 22.6 % — SIGNIFICANT CHANGE UP (ref 13–44)
LYMPHOCYTES # BLD AUTO: 25.1 % — SIGNIFICANT CHANGE UP (ref 13–44)
MAGNESIUM SERPL-MCNC: 2.1 MG/DL — SIGNIFICANT CHANGE UP (ref 1.6–2.6)
MCHC RBC-ENTMCNC: 30.5 PG — SIGNIFICANT CHANGE UP (ref 27–34)
MCHC RBC-ENTMCNC: 31.7 GM/DL — LOW (ref 32–36)
MCHC RBC-ENTMCNC: 31.7 PG — SIGNIFICANT CHANGE UP (ref 27–34)
MCHC RBC-ENTMCNC: 32.6 GM/DL — SIGNIFICANT CHANGE UP (ref 32–36)
MCV RBC AUTO: 96.3 FL — SIGNIFICANT CHANGE UP (ref 80–100)
MCV RBC AUTO: 97 FL — SIGNIFICANT CHANGE UP (ref 80–100)
MONOCYTES # BLD AUTO: 0.71 K/UL — SIGNIFICANT CHANGE UP (ref 0–0.9)
MONOCYTES # BLD AUTO: 0.77 K/UL — SIGNIFICANT CHANGE UP (ref 0–0.9)
MONOCYTES NFR BLD AUTO: 10.4 % — SIGNIFICANT CHANGE UP (ref 2–14)
MONOCYTES NFR BLD AUTO: 11.1 % — SIGNIFICANT CHANGE UP (ref 2–14)
NEUTROPHILS # BLD AUTO: 4.2 K/UL — SIGNIFICANT CHANGE UP (ref 1.8–7.4)
NEUTROPHILS # BLD AUTO: 4.35 K/UL — SIGNIFICANT CHANGE UP (ref 1.8–7.4)
NEUTROPHILS NFR BLD AUTO: 61.5 % — SIGNIFICANT CHANGE UP (ref 43–77)
NEUTROPHILS NFR BLD AUTO: 63 % — SIGNIFICANT CHANGE UP (ref 43–77)
NRBC # BLD: 0 /100 WBCS — SIGNIFICANT CHANGE UP (ref 0–0)
NRBC # BLD: 0 /100 WBCS — SIGNIFICANT CHANGE UP (ref 0–0)
NRBC # FLD: 0 K/UL — SIGNIFICANT CHANGE UP (ref 0–0)
NRBC # FLD: 0 K/UL — SIGNIFICANT CHANGE UP (ref 0–0)
PHOSPHATE SERPL-MCNC: 3.3 MG/DL — SIGNIFICANT CHANGE UP (ref 2.5–4.5)
PLATELET # BLD AUTO: 159 K/UL — SIGNIFICANT CHANGE UP (ref 150–400)
PLATELET # BLD AUTO: 178 K/UL — SIGNIFICANT CHANGE UP (ref 150–400)
POTASSIUM SERPL-MCNC: 4.9 MMOL/L — SIGNIFICANT CHANGE UP (ref 3.5–5.3)
POTASSIUM SERPL-SCNC: 4.9 MMOL/L — SIGNIFICANT CHANGE UP (ref 3.5–5.3)
PROT SERPL-MCNC: 6.4 G/DL — SIGNIFICANT CHANGE UP (ref 6–8.3)
PROTHROM AB SERPL-ACNC: 47.5 SEC — HIGH (ref 10.5–13.4)
RBC # BLD: 4.01 M/UL — LOW (ref 4.2–5.8)
RBC # BLD: 4.06 M/UL — LOW (ref 4.2–5.8)
RBC # FLD: 16.2 % — HIGH (ref 10.3–14.5)
RBC # FLD: 16.2 % — HIGH (ref 10.3–14.5)
SODIUM SERPL-SCNC: 139 MMOL/L — SIGNIFICANT CHANGE UP (ref 135–145)
WBC # BLD: 6.84 K/UL — SIGNIFICANT CHANGE UP (ref 3.8–10.5)
WBC # BLD: 6.91 K/UL — SIGNIFICANT CHANGE UP (ref 3.8–10.5)
WBC # FLD AUTO: 6.84 K/UL — SIGNIFICANT CHANGE UP (ref 3.8–10.5)
WBC # FLD AUTO: 6.91 K/UL — SIGNIFICANT CHANGE UP (ref 3.8–10.5)

## 2022-08-23 PROCEDURE — 99223 1ST HOSP IP/OBS HIGH 75: CPT

## 2022-08-23 PROCEDURE — 99233 SBSQ HOSP IP/OBS HIGH 50: CPT

## 2022-08-23 RX ADMIN — Medication 40 MILLIGRAM(S): at 06:58

## 2022-08-23 RX ADMIN — Medication 150 MILLIGRAM(S): at 02:37

## 2022-08-23 RX ADMIN — TAMSULOSIN HYDROCHLORIDE 0.4 MILLIGRAM(S): 0.4 CAPSULE ORAL at 23:19

## 2022-08-23 RX ADMIN — Medication 40 MILLIGRAM(S): at 13:00

## 2022-08-23 NOTE — ED ADULT NURSE REASSESSMENT NOTE - NS ED NURSE REASSESS COMMENT FT1
Report received from night RNKwaku. Pt A&Ox4 and ambulatory. Verbalizes improvement of symptoms, stating he has not spit up bloody mucus this morning. Appears comfortable laying in bed speaking on phone with wife, sitting up in bed. Airway patent, speaking full and complete sentences. Respirations even and unlabored. Pt ambulates with steady gait to restroom. Denies CP, SOB, nausea, vomiting, headache, fever or chills. Bed in lowest position, wheels locked, side rails up, safety maintained. Awaiting further orders.

## 2022-08-23 NOTE — PROGRESS NOTE ADULT - ASSESSMENT
78 y/o M with pmhx of Depression, bipolar, BPH, hx of STEMI and cardiogenic shock in may 2022, presented to the ED for new onset hemoptysis. Patient is known to have lung mass which he is being worked up outpatient and is planned for biopsy. Found to have INR of 4.4,

## 2022-08-23 NOTE — CHART NOTE - NSCHARTNOTEFT_GEN_A_CORE
Discussed with Cardiothoracic PA, patient is now admitted under Cardiothoracic service.     Corina Logan PA-C  pager [phone]

## 2022-08-23 NOTE — PROGRESS NOTE ADULT - SUBJECTIVE AND OBJECTIVE BOX
Patient is a 77y old  Male who presents with a chief complaint of hemoptysis (23 Aug 2022 15:26)    Jeronimo Hammond MD   [facility]   Pager [phone]  Reachable on [employer] Teams     SUBJECTIVE / OVERNIGHT EVENTS:  Patient seen and examined this moring with wife at bedside. He states that he has not had any further episodes of blood in his sputum.   Currently he is feeling well and has no complaints.   Denies any chest pain, SOB, palpitations, dizziness, vision changes.     MEDICATIONS  (STANDING):  atorvastatin 80 milliGRAM(s) Oral at bedtime  furosemide    Tablet 40 milliGRAM(s) Oral two times a day  metoprolol succinate ER 25 milliGRAM(s) Oral daily  mirtazapine 15 milliGRAM(s) Oral at bedtime  tamsulosin 0.4 milliGRAM(s) Oral at bedtime  venlafaxine XR. 150 milliGRAM(s) Oral daily    MEDICATIONS  (PRN):  acetaminophen     Tablet .. 650 milliGRAM(s) Oral every 6 hours PRN Temp greater or equal to 38C (100.4F), Mild Pain (1 - 3)  melatonin 3 milliGRAM(s) Oral at bedtime PRN Insomnia      Vital Signs Last 24 Hrs  T(C): 36.3 (23 Aug 2022 11:42), Max: 36.8 (23 Aug 2022 06:47)  T(F): 97.3 (23 Aug 2022 11:42), Max: 98.3 (23 Aug 2022 06:47)  HR: 80 (23 Aug 2022 11:42) (73 - 80)  BP: 104/72 (23 Aug 2022 11:42) (90/77 - 115/71)  BP(mean): 87 (23 Aug 2022 06:47) (87 - 87)  RR: 17 (23 Aug 2022 11:42) (16 - 18)  SpO2: 99% (23 Aug 2022 11:42) (95% - 99%)  CAPILLARY BLOOD GLUCOSE        I&O's Summary      General: elderly m,an sitting up in on side of bed, appears well in NAD, awake and alert  Eyes: conjunctiva clear, nonicteric sclera  HENMT: MMM   Respiratory: No respiratory distress, CTABL  Cardiovascular: S1,S2; Regular rate and rhythm; No m/g/r. 2+ peripheral pulses  Gastrointestinal: Soft, Nontender, Nondistended; +BS.   Extremities: No c/c/e; warm to touch  Skin: No rashes, No erythema   Psych: AAOx3; appropriate mood and affect    LABS:                        12.4   6.91  )-----------( 159      ( 23 Aug 2022 06:50 )             39.1     08-23    139  |  107  |  36<H>  ----------------------------<  96  4.9   |  23  |  1.20    Ca    8.8      23 Aug 2022 06:50  Phos  3.3     08-23  Mg     2.10     08-23    TPro  6.4  /  Alb  3.6  /  TBili  1.2  /  DBili  x   /  AST  45<H>  /  ALT  50<H>  /  AlkPhos  118  08-23    PT/INR - ( 23 Aug 2022 06:50 )   PT: 47.5 sec;   INR: 4.04 ratio         PTT - ( 22 Aug 2022 14:00 )  PTT:43.3 sec          RADIOLOGY & ADDITIONAL TESTS:    Imaging Personally Reviewed:    Consultant(s) Notes Reviewed:      Care Discussed with Consultants/Other Providers:

## 2022-08-23 NOTE — ED ADULT NURSE REASSESSMENT NOTE - NS ED NURSE REASSESS COMMENT FT1
Paged team for update in plan of care for pt. Pt denies bloody sputum over night and is requesting food. Resident stated that she will communicate with attending and probably resume diet order.

## 2022-08-23 NOTE — CONSULT NOTE ADULT - SUBJECTIVE AND OBJECTIVE BOX
CARDIOLOGY FELLOW CONSULT NOTE    HPI:  This is a 76 y/o M with pmhx of Depression, bipolar, BPH, COVID Winter 2021 (recovered), hx of STEMI and cardiogenic shock in may 2022, presented to the ED for new onset hemoptysis.   Started on Sunday, a couple scant drops mixed in with what he spat out. Less than 2 tablespoons, has not had any since admission to hospital. Known R lung nodule, suspicious for CA, pending IR biopsy.   Patient with recent hospitalization where he had a STMEI 5/2022, found to have 100% occlusion RCA and 70% occlusion LAD. Viability showing poor salvageability and so he was medically managed. Also during this time, TTE with LV thrombus for which he was started on Coumadin, DAPT. He was to continue Coumadin x 3 months, last dose up until 8/26 for which he was compliant. He said last INR check over the weekend with INR 2.4.   ROS otherwise negative for orthopnea, bendopnea, weight gain (stable @ 180lbs) or weight loss, CRISTIAN, anginal equivalents (in fact he swam on Sunday morning without any difficulty and is able to traverse several [facility] blocks without symptoms).     Cardiologist: Dr. Win Wilkinson: Dr. Espinal  CT surg for lung mass: Dr. Padilla (22 Aug 2022 20:33)      PMHx:   BPH (benign prostatic hyperplasia)    Bipolar disorder    Depression    Anxiety    Umbilical hernia, incarcerated    Inguinal hernia of right side without obstruction or gangrene    Depression    Constipation    Urinary retention    CAD (coronary artery disease)        PSHx:   Anxiety    Depression    BPH (benign prostatic hyperplasia)    History of arthroscopy of knee    History of tonsillectomy    History of hernia repair        Allergies:  No Known Allergies      Home Meds:  plavix 75mg, lasix 40mg daily, atorva 80, metop succ 25mg ER, Coumadin 4mg  Effexor XR 150mg XR, mirtazepine 7.5mg   Flomax 0.5    Current Medications:   acetaminophen     Tablet .. 650 milliGRAM(s) Oral every 6 hours PRN  atorvastatin 80 milliGRAM(s) Oral at bedtime  furosemide    Tablet 40 milliGRAM(s) Oral two times a day  melatonin 3 milliGRAM(s) Oral at bedtime PRN  metoprolol succinate ER 25 milliGRAM(s) Oral daily  mirtazapine 15 milliGRAM(s) Oral at bedtime  tamsulosin 0.4 milliGRAM(s) Oral at bedtime  venlafaxine XR. 150 milliGRAM(s) Oral daily      FAMILY HISTORY:  Family history of depression (Mother)        Social History:  Smoking History:  Alcohol Use:  Drug Use:    REVIEW OF SYSTEMS:  CONSTITUTIONAL: No weakness, fevers or chills  EYES/ENT: No visual changes;  No dysphagia  NECK: No pain or stiffness  RESPIRATORY: No cough, wheezing, hemoptysis; No shortness of breath  CARDIOVASCULAR: No chest pain or palpitations; No lower extremity edema  GASTROINTESTINAL: No abdominal or epigastric pain. No nausea, vomiting, or hematemesis; No diarrhea or constipation. No melena or hematochezia.  BACK: No back pain  GENITOURINARY: No dysuria, frequency or hematuria  NEUROLOGICAL: No numbness or weakness  SKIN: No itching, burning, rashes, or lesions   All other review of systems is negative unless indicated above.    Physical Exam:  T(F): 97.3 (08-23), Max: 98.3 (08-23)  HR: 80 (08-23) (73 - 80)  BP: 104/72 (08-23) (90/77 - 115/71)  RR: 17 (08-23)  SpO2: 99% (08-23)  GENERAL: No acute distress, well-developed. Appears stated age  HEAD:  Atraumatic, Normocephalic  ENT: EOMI, PERRLA, conjunctiva and sclera clear, Neck supple, No JVD, moist mucosa  CHEST/LUNG: Clear to auscultation bilaterally; No wheeze, equal breath sounds bilaterally   BACK: No spinal tenderness  HEART: Regular rate and rhythm; No murmurs, rubs, or gallops  ABDOMEN: Soft, Nontender, Nondistended; Bowel sounds present  EXTREMITIES:  No clubbing, cyanosis, or edema  PSYCH: Nl behavior, nl affect  NEUROLOGY: AAOx3, non-focal, cranial nerves intact  SKIN: Normal color, No rashes or lesions  LINES:    EKG: PVC, sinus rhythm, first degree AV block, left axis deviation. Anterior Q waves and poor R wave progression     TTE:  Dimensions:    Normal Values:  LA:     4.3    2.0 - 4.0 cm  Ao:     3.6    2.0 - 3.8 cm  SEPTUM: 1.3    0.6 - 1.2 cm  PWT:  0.9    0.6 - 1.1 cm  LVIDd:  5.7    3.0 - 5.6 cm  LVIDs:  5.0    1.8 - 4.0 cm  Derived variables:  LVMI: 122 g/m2  RWT: 0.31  Fractional short: 12 %  EF (Modified Pizarro Rule): 30 %  ------------------------------------------------------------------------  Observations:  Mitral Valve: Tethered mitral valve leaflets with normal  opening. Moderate mitral regurgitation.  Aortic Valve/Aorta: Calcified trileaflet aortic valve with  normal opening. Minimal aortic regurgitation.  Aortic Root: 3.6 cm.  Left Atrium: Moderately dilated left atrium.  LA volume  index = 42 cc/m2.  Left Ventricle: Endocardial visualization enhanced with  intravenous injection of Ultrasonic Enhancing Agent  (Lumason).  Severe global left ventricular systolic  dysfunction.  Left ventricular thrombus measuring 1.0 by  1.3 cm seen in LV apex. The mid anterior septum, the basal  anterior septum, the mid anterior wall, and the mid  inferoseptum are akinetic.  Mild left ventricular  enlargement. Severe reversible diastolic dysfunction (Stage  III).  Right Heart: Moderate right atrial enlargement. Right  ventricular enlargement with decreased right ventricular  systolic function. Normal tricuspid valve. Mild tricuspid  regurgitation. Normal pulmonic valve. Mild pulmonic  regurgitation.  Pericardium/Pleura: Small pericardial effusion. No evidence  of right atrial or right ventricular collapse. No  echocardiographic evidence of pericardial tamponade.  Bilateral pleural effusions.  Hemodynamic: Estimated right atrial pressure is 8 mm Hg.  Estimated right ventricular systolic pressure equals 35 mm  Hg, assuming right atrial pressure equals 8 mm Hg,  consistent with borderline pulmonary hypertension.  ------------------------------------------------------------------------  Conclusions:  1. Tethered mitral valve leaflets with normal opening.  Moderate mitral regurgitation.  2. Minimal aortic regurgitation.  3. Endocardial visualization enhanced with intravenous  injection of Ultrasonic Enhancing Agent (Lumason). Severe  global left ventricular systolic dysfunction.  Left  ventricular thrombus measuring 1.0 by 1.3 cm seen in LV  apex.  4. Severe reversible diastolic dysfunction (Stage III).  5. Right ventricular enlargement with decreased right  ventricular systolic function.  6. Small pericardial effusion. No evidence of right atrial  or right ventricular collapse. No echocardiographic  evidence of pericardial tamponade.  7. Bilateral pleural effusions.  *** Compared with echocardiogram of 5/12/2022, the trombus  is new.  Results communicated to Bradley Herbert MD.    Cath:  RCA  and significant LAD and diagonal lesions with LILLIANA 3 flow. IABP placed for elevated right sided pressures. Consider  PCI after medical management and possible viability    Viability  IMPRESSIONS:Abnormal Study  * The left ventricle was normal in size. The mid to distal  anterior, mid to distal anteroseptal, apical, and inferior  walls do not demonstrate significant viability.  The  septum demonstrates mild viability. Theremaining segments  are viable (predominantly the lateral wall only).  * Rest gated wall motion analysis was performed (LVEF = 29  %;LVEDV = 205 ml.), revealing markedly reduced  LV  function with regional variation.  Conclusion:  The left ventricle was normal in size. The mid to distal  anterior, mid to distal anteroseptal, apical, and inferior  walls do not demonstrate significant viability.  The  septum demonstrates mild viability. The remaining segments  are viable (predominantly the lateral wall only).                 12.4   6.91  )-----------( 159      ( 23 Aug 2022 06:50 )             39.1     08-23    139  |  107  |  36<H>  ----------------------------<  96  4.9   |  23  |  1.20    Ca    8.8      23 Aug 2022 06:50  Phos  3.3     08-23  Mg     2.10     08-23    TPro  6.4  /  Alb  3.6  /  TBili  1.2  /  DBili  x   /  AST  45<H>  /  ALT  50<H>  /  AlkPhos  118  08-23    PT/INR - ( 23 Aug 2022 06:50 )   PT: 47.5 sec;   INR: 4.04 ratio         PTT - ( 22 Aug 2022 14:00 )  PTT:43.3 sec   CARDIOLOGY FELLOW CONSULT NOTE    HPI:  This is a 76 y/o M with pmhx of Depression, bipolar, BPH, COVID Winter 2021 (recovered), hx of STEMI and cardiogenic shock in may 2022, presented to the ED for new onset hemoptysis.   Started on Sunday, a couple scant drops mixed in with what he spat out. Less than 2 tablespoons, has not had any since admission to hospital. Known R lung nodule, suspicious for CA, pending IR biopsy.   Patient with recent hospitalization where he had a STMEI 5/2022, found to have 100% occlusion RCA and 70% occlusion LAD, needed CCU for IABP support and then weaned off. Viability showing poor salvageability and so he was medically managed. Also during this time, TTE with EF 30%, LV thrombus for which he was started on Coumadin, DAPT. He was to continue Coumadin x 3 months, last dose up until 8/26 for which he was compliant. He said last INR check over the weekend with INR 2.4.   ROS otherwise negative for orthopnea, bendopnea, weight gain (stable @ 180lbs) or weight loss, CRISTIAN, anginal equivalents (in fact he swam on Sunday morning without any difficulty and is able to traverse several [facility] blocks without symptoms).     Cardiologist: Dr. Win Wilkinson: Dr. Espinal  CT surg for lung mass: Dr. Padilla (22 Aug 2022 20:33)      PMHx:   BPH (benign prostatic hyperplasia)    Bipolar disorder    Depression    Anxiety    Umbilical hernia, incarcerated    Inguinal hernia of right side without obstruction or gangrene    Depression    Constipation    Urinary retention    CAD (coronary artery disease)        PSHx:   Anxiety    Depression    BPH (benign prostatic hyperplasia)    History of arthroscopy of knee    History of tonsillectomy    History of hernia repair        Allergies:  No Known Allergies      Home Meds:  plavix 75mg, lasix 40mg daily, atorva 80, metop succ 25mg ER, Coumadin 4mg  Effexor XR 150mg XR, mirtazepine 7.5mg   Flomax 0.5    Current Medications:   acetaminophen     Tablet .. 650 milliGRAM(s) Oral every 6 hours PRN  atorvastatin 80 milliGRAM(s) Oral at bedtime  furosemide    Tablet 40 milliGRAM(s) Oral two times a day  melatonin 3 milliGRAM(s) Oral at bedtime PRN  metoprolol succinate ER 25 milliGRAM(s) Oral daily  mirtazapine 15 milliGRAM(s) Oral at bedtime  tamsulosin 0.4 milliGRAM(s) Oral at bedtime  venlafaxine XR. 150 milliGRAM(s) Oral daily      FAMILY HISTORY:  Family history of depression (Mother)        Social History:  Smoking History:  Alcohol Use:  Drug Use:    REVIEW OF SYSTEMS:  CONSTITUTIONAL: No weakness, fevers or chills  EYES/ENT: No visual changes;  No dysphagia  NECK: No pain or stiffness  RESPIRATORY: No cough, wheezing, hemoptysis; No shortness of breath  CARDIOVASCULAR: No chest pain or palpitations; No lower extremity edema  GASTROINTESTINAL: No abdominal or epigastric pain. No nausea, vomiting, or hematemesis; No diarrhea or constipation. No melena or hematochezia.  BACK: No back pain  GENITOURINARY: No dysuria, frequency or hematuria  NEUROLOGICAL: No numbness or weakness  SKIN: No itching, burning, rashes, or lesions   All other review of systems is negative unless indicated above.    Physical Exam:  T(F): 97.3 (08-23), Max: 98.3 (08-23)  HR: 80 (08-23) (73 - 80)  BP: 104/72 (08-23) (90/77 - 115/71)  RR: 17 (08-23)  SpO2: 99% (08-23)  GENERAL: No acute distress, well-developed. Appears stated age  HEAD:  Atraumatic, Normocephalic  ENT: EOMI, PERRLA, conjunctiva and sclera clear, Neck supple, No JVD, moist mucosa  CHEST/LUNG: Clear to auscultation bilaterally; No wheeze, equal breath sounds bilaterally   BACK: No spinal tenderness  HEART: Regular rate and rhythm; No murmurs, rubs, or gallops  ABDOMEN: Soft, Nontender, Nondistended; Bowel sounds present  EXTREMITIES:  No clubbing, cyanosis, or edema  PSYCH: Nl behavior, nl affect  NEUROLOGY: AAOx3, non-focal, cranial nerves intact  SKIN: Normal color, No rashes or lesions  LINES:    EKG: PVC, sinus rhythm, first degree AV block, left axis deviation. Anterior Q waves and poor R wave progression     TTE:  Dimensions:    Normal Values:  LA:     4.3    2.0 - 4.0 cm  Ao:     3.6    2.0 - 3.8 cm  SEPTUM: 1.3    0.6 - 1.2 cm  PWT:  0.9    0.6 - 1.1 cm  LVIDd:  5.7    3.0 - 5.6 cm  LVIDs:  5.0    1.8 - 4.0 cm  Derived variables:  LVMI: 122 g/m2  RWT: 0.31  Fractional short: 12 %  EF (Modified Pizarro Rule): 30 %  ------------------------------------------------------------------------  Observations:  Mitral Valve: Tethered mitral valve leaflets with normal  opening. Moderate mitral regurgitation.  Aortic Valve/Aorta: Calcified trileaflet aortic valve with  normal opening. Minimal aortic regurgitation.  Aortic Root: 3.6 cm.  Left Atrium: Moderately dilated left atrium.  LA volume  index = 42 cc/m2.  Left Ventricle: Endocardial visualization enhanced with  intravenous injection of Ultrasonic Enhancing Agent  (Lumason).  Severe global left ventricular systolic  dysfunction.  Left ventricular thrombus measuring 1.0 by  1.3 cm seen in LV apex. The mid anterior septum, the basal  anterior septum, the mid anterior wall, and the mid  inferoseptum are akinetic.  Mild left ventricular  enlargement. Severe reversible diastolic dysfunction (Stage  III).  Right Heart: Moderate right atrial enlargement. Right  ventricular enlargement with decreased right ventricular  systolic function. Normal tricuspid valve. Mild tricuspid  regurgitation. Normal pulmonic valve. Mild pulmonic  regurgitation.  Pericardium/Pleura: Small pericardial effusion. No evidence  of right atrial or right ventricular collapse. No  echocardiographic evidence of pericardial tamponade.  Bilateral pleural effusions.  Hemodynamic: Estimated right atrial pressure is 8 mm Hg.  Estimated right ventricular systolic pressure equals 35 mm  Hg, assuming right atrial pressure equals 8 mm Hg,  consistent with borderline pulmonary hypertension.  ------------------------------------------------------------------------  Conclusions:  1. Tethered mitral valve leaflets with normal opening.  Moderate mitral regurgitation.  2. Minimal aortic regurgitation.  3. Endocardial visualization enhanced with intravenous  injection of Ultrasonic Enhancing Agent (Lumason). Severe  global left ventricular systolic dysfunction.  Left  ventricular thrombus measuring 1.0 by 1.3 cm seen in LV  apex.  4. Severe reversible diastolic dysfunction (Stage III).  5. Right ventricular enlargement with decreased right  ventricular systolic function.  6. Small pericardial effusion. No evidence of right atrial  or right ventricular collapse. No echocardiographic  evidence of pericardial tamponade.  7. Bilateral pleural effusions.  *** Compared with echocardiogram of 5/12/2022, the trombus  is new.  Results communicated to Bradley Herbert MD.    Cath:  RCA  and significant LAD and diagonal lesions with LILLIANA 3 flow. IABP placed for elevated right sided pressures. Consider  PCI after medical management and possible viability    Viability  IMPRESSIONS:Abnormal Study  * The left ventricle was normal in size. The mid to distal  anterior, mid to distal anteroseptal, apical, and inferior  walls do not demonstrate significant viability.  The  septum demonstrates mild viability. Theremaining segments  are viable (predominantly the lateral wall only).  * Rest gated wall motion analysis was performed (LVEF = 29  %;LVEDV = 205 ml.), revealing markedly reduced  LV  function with regional variation.  Conclusion:  The left ventricle was normal in size. The mid to distal  anterior, mid to distal anteroseptal, apical, and inferior  walls do not demonstrate significant viability.  The  septum demonstrates mild viability. The remaining segments  are viable (predominantly the lateral wall only).                 12.4   6.91  )-----------( 159      ( 23 Aug 2022 06:50 )             39.1     08-23    139  |  107  |  36<H>  ----------------------------<  96  4.9   |  23  |  1.20    Ca    8.8      23 Aug 2022 06:50  Phos  3.3     08-23  Mg     2.10     08-23    TPro  6.4  /  Alb  3.6  /  TBili  1.2  /  DBili  x   /  AST  45<H>  /  ALT  50<H>  /  AlkPhos  118  08-23    PT/INR - ( 23 Aug 2022 06:50 )   PT: 47.5 sec;   INR: 4.04 ratio         PTT - ( 22 Aug 2022 14:00 )  PTT:43.3 sec

## 2022-08-23 NOTE — CONSULT NOTE ADULT - ASSESSMENT
76 y/o M with pmhx of Depression, bipolar, BPH, hx of STEMI and cardiogenic shock in may 2022 w/ viability suggesting against salvagability, LV thrombus, medically managed, who presents with hemoptysis in the setting of lung nodule (possibly malignant) and a supratherapeutic INR.     Plan:  - Repeat TTE  - Hold Coumadin (goal INR 2-3). Noted as per ACC, DOAC use is associated with higher rates of stroke and systemic embolism than warfarin for LV thrombi in a multicenter, retrospective analysis (but prospective studies are needed to directly compare DOAC and warfarin therapy for LV thrombi).  Patients with documented resolution of LV thrombus by echo may still experience stroke or systemic embolism.  Source: https://[email or web address]/journals/jamacardiology/article-abstract/6798235  - Regarding his iCMP with [facility], he is currently without any evidence of decompensation. Continue atorva 80, metop 25mg XL, and maintenance lasix 40mg bid for now.   Noted that if other GDMT i.e. entresto were to be started, which can certainly be trialed this hospitalization (24/26 bid to start), he would likely require a lower maintenance dose of diuretic. This medication, along with SGLT2i, MRCA, can be discussed with patient prior to trying as his BPs are currently 100s-110s systolic  78 y/o M with pmhx of Depression, bipolar, BPH, hx of STEMI and cardiogenic shock in may 2022 w/ viability suggesting against salvagability, LV thrombus, medically managed, who presents with hemoptysis in the setting of lung nodule (possibly malignant) and a supratherapeutic INR.     Plan:  - Repeat TTE  - Hold Coumadin (goal INR 2-3). Noted as per ACC, DOAC use is associated with higher rates of stroke and systemic embolism than warfarin for LV thrombi in a multicenter, retrospective analysis (but prospective studies are needed to directly compare DOAC and warfarin therapy for LV thrombi).  Patients with documented resolution of LV thrombus by echo may still experience stroke or systemic embolism.  Source: https://[email or web address]/journals/jamacardiology/article-abstract/7825457  - Regarding his iCMP with [facility], he is currently without any evidence of decompensation. Continue atorva 80, metop 25mg XL, and maintenance lasix 40mg bid for now.   Noted that if other GDMT i.e. entresto were to be started, which can certainly be trialed this hospitalization (24/26 bid to start), he would likely require a lower maintenance dose of diuretic. This medication, along with SGLT2i, MRCA, can be discussed with patient prior to trying as his BPs are currently 100s-110s systolic     This patient was seen and examined personally by me and the plan was discussed with the fellow and/or resident above. Amendments were made as necessary to the above. Agree with the excellent note and plan above. 77M w prior STEMI c/b shock, CHF and LV Thrombus on coumadin now w hemoptysis and lung nodule. Holding coumadin, rpt TTE; well compensated CHF cont meds.     Rafael Man MD, MPhil, [facility]  Cardiologist, [facility]  ; [facility] and [facility]  Email: [email or web address] Surgery on-service contact/call information, go to [email or web address] and use [email or web address]" to login.  Outpatient Cardiology appointments, call [phone] to arrange with a colleague; I do not have outpatient Cardiology clinic.

## 2022-08-23 NOTE — PATIENT PROFILE ADULT - DATE OF SECOND COVID-19 BOOSTER
Imiquimod Pregnancy And Lactation Text: This medication is Pregnancy Category C. It is unknown if this medication is excreted in breast milk. 24-Aug-2022

## 2022-08-23 NOTE — PATIENT PROFILE ADULT - FALL HARM RISK - RISK INTERVENTIONS

## 2022-08-24 LAB
ANION GAP SERPL CALC-SCNC: 11 MMOL/L — SIGNIFICANT CHANGE UP (ref 7–14)
BUN SERPL-MCNC: 38 MG/DL — HIGH (ref 7–23)
CALCIUM SERPL-MCNC: 9 MG/DL — SIGNIFICANT CHANGE UP (ref 8.4–10.5)
CHLORIDE SERPL-SCNC: 105 MMOL/L — SIGNIFICANT CHANGE UP (ref 98–107)
CO2 SERPL-SCNC: 22 MMOL/L — SIGNIFICANT CHANGE UP (ref 22–31)
CREAT SERPL-MCNC: 1.31 MG/DL — HIGH (ref 0.5–1.3)
EGFR: 56 ML/MIN/1.73M2 — LOW
GLUCOSE SERPL-MCNC: 103 MG/DL — HIGH (ref 70–99)
HCT VFR BLD CALC: 38.2 % — LOW (ref 39–50)
HGB BLD-MCNC: 12.6 G/DL — LOW (ref 13–17)
INR BLD: 2.49 RATIO — HIGH (ref 0.88–1.16)
MAGNESIUM SERPL-MCNC: 2.1 MG/DL — SIGNIFICANT CHANGE UP (ref 1.6–2.6)
MCHC RBC-ENTMCNC: 30.8 PG — SIGNIFICANT CHANGE UP (ref 27–34)
MCHC RBC-ENTMCNC: 33 GM/DL — SIGNIFICANT CHANGE UP (ref 32–36)
MCV RBC AUTO: 93.4 FL — SIGNIFICANT CHANGE UP (ref 80–100)
NRBC # BLD: 0 /100 WBCS — SIGNIFICANT CHANGE UP (ref 0–0)
NRBC # FLD: 0 K/UL — SIGNIFICANT CHANGE UP (ref 0–0)
PHOSPHATE SERPL-MCNC: 3.9 MG/DL — SIGNIFICANT CHANGE UP (ref 2.5–4.5)
PLATELET # BLD AUTO: 166 K/UL — SIGNIFICANT CHANGE UP (ref 150–400)
POTASSIUM SERPL-MCNC: 4.3 MMOL/L — SIGNIFICANT CHANGE UP (ref 3.5–5.3)
POTASSIUM SERPL-SCNC: 4.3 MMOL/L — SIGNIFICANT CHANGE UP (ref 3.5–5.3)
PROTHROM AB SERPL-ACNC: 29.2 SEC — HIGH (ref 10.5–13.4)
RBC # BLD: 4.09 M/UL — LOW (ref 4.2–5.8)
RBC # FLD: 16 % — HIGH (ref 10.3–14.5)
SODIUM SERPL-SCNC: 138 MMOL/L — SIGNIFICANT CHANGE UP (ref 135–145)
WBC # BLD: 6.81 K/UL — SIGNIFICANT CHANGE UP (ref 3.8–10.5)
WBC # FLD AUTO: 6.81 K/UL — SIGNIFICANT CHANGE UP (ref 3.8–10.5)

## 2022-08-24 PROCEDURE — 99231 SBSQ HOSP IP/OBS SF/LOW 25: CPT

## 2022-08-24 PROCEDURE — 99233 SBSQ HOSP IP/OBS HIGH 50: CPT

## 2022-08-24 PROCEDURE — 93306 TTE W/DOPPLER COMPLETE: CPT | Mod: 26

## 2022-08-24 RX ORDER — WARFARIN SODIUM 2.5 MG/1
3 TABLET ORAL AT BEDTIME
Refills: 0 | Status: DISCONTINUED | OUTPATIENT
Start: 2022-08-24 | End: 2022-08-24

## 2022-08-24 RX ORDER — WARFARIN SODIUM 2.5 MG/1
2.5 TABLET ORAL ONCE
Refills: 0 | Status: DISCONTINUED | OUTPATIENT
Start: 2022-08-24 | End: 2022-08-24

## 2022-08-24 RX ADMIN — Medication 40 MILLIGRAM(S): at 13:55

## 2022-08-24 RX ADMIN — MIRTAZAPINE 15 MILLIGRAM(S): 45 TABLET, ORALLY DISINTEGRATING ORAL at 21:18

## 2022-08-24 RX ADMIN — Medication 40 MILLIGRAM(S): at 06:36

## 2022-08-24 RX ADMIN — TAMSULOSIN HYDROCHLORIDE 0.4 MILLIGRAM(S): 0.4 CAPSULE ORAL at 21:18

## 2022-08-24 RX ADMIN — Medication 25 MILLIGRAM(S): at 06:36

## 2022-08-24 RX ADMIN — ATORVASTATIN CALCIUM 80 MILLIGRAM(S): 80 TABLET, FILM COATED ORAL at 21:18

## 2022-08-24 RX ADMIN — Medication 150 MILLIGRAM(S): at 13:56

## 2022-08-24 NOTE — PROGRESS NOTE ADULT - ASSESSMENT
HPI:  This is a 78 y/o M with pmhx of Depression, bipolar, BPH, hx of STEMI and cardiogenic shock in may 2022, presented to the ED for new onset hemoptysis. The patient 2 days ago had an episode of light red sputum production. He then had another episode this morning. Both episodes no clots noted. No profuse bleeding with coughing. The patient had a small episode of SOB but resolved, he was able to exercise and swim this morning. The patient known to have a STEMI in 5/2022 for which he had 100% occlusion of RCA and 70% occlusion of LAD however was not treated with stents because viability study did not show salvageable heart damage, was managed medically. The patient also found to have a LV thrombus which is 1x1.3 cm in size. He was started on warfarin and also started on ASA/plavix for STEMI event. The patient also found to have a R lung nodule which is suspicious for cancer, is pending IR biopsy. He needs to wait until he finishes his warfarin treatment for the LV thrombus (which is planned to stop on Friday 8/26/2022).    Cardiologist: Dr. Walden  Pulm: Dr. Espinal  CT surg for lung mass: Dr. Padilla (22 Aug 2022 20:33)      Pt no longer having hemoptysis, pt d/w cardiology fellow and Dr Padilla. Suspect hemoptysis related to supratherapeutic INR. Due to high risk for embolism 2/2 LV thrombus will restart coumadin at lower dose, cont hold plavix, echo ordered, Dr Padilla to speak w/ Dr Walden after echo to determine weather to cont coumadin and do IR as outpatient. Daily labs.    Neuro: Pain management  Pulm: Encourage coughing, deep breathing and use of incentive spirometry. Wean off supplemental oxygen as able. Daily CXR.   Cardio: Monitor telemetry/alarms  GI: Tolerating diet. Continue stool softeners.  Renal: monitor urine output, supplement electrolytes as needed  Vasc: Heparin SC/SCDs for DVT prophylaxis  Heme: Stable H/H. .   ID: Off antibiotics. Stable.  Therapy: OOB/ambulate  Discussed with Cardiothoracic Team at AM rounds.

## 2022-08-24 NOTE — PROGRESS NOTE ADULT - ASSESSMENT
76 y/o M with pmhx of Depression, bipolar, BPH, hx of STEMI and cardiogenic shock in may 2022, presented to the ED for new onset hemoptysis. Patient is known to have lung mass which he is being worked up outpatient and is planned for biopsy. Found to have INR of 4.4,

## 2022-08-24 NOTE — PROGRESS NOTE ADULT - ASSESSMENT
78 y/o M with pmhx of Depression, bipolar, BPH, hx of STEMI and cardiogenic shock in may 2022 w/ viability suggesting against salvagability, LV thrombus, medically managed, who presents with hemoptysis in the setting of lung nodule (possibly malignant) and a supratherapeutic INR.     Plan:  - Repeat TTE pending  - Hold Coumadin (goal INR 2-3). Noted as per ACC, DOAC use is associated with higher rates of stroke and systemic embolism than warfarin for LV thrombi in a multicenter, retrospective analysis (but prospective studies are needed to directly compare DOAC and warfarin therapy for LV thrombi).  Patients with documented resolution of LV thrombus by echo may still experience stroke or systemic embolism.  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 Continue atorva 80, metop 25mg XL, and maintenance lasix 40mg bid for now. He is currently well compensated. Will stay the course for now  * Noted that if other GDMT i.e. entresto were to be started, which can certainly be trialed this hospitalization (24/26 bid to start), he would likely require a lower maintenance dose of diuretic. This medication, along with SGLT2i, MRCA, can be discussed with patient prior to trying as his BPs are currently 100s-110s systolic   - Will defer to primary team re: inpatient vs. outpatient biopsy of lung lesion    78 y/o M with pmhx of Depression, bipolar, BPH, hx of STEMI and cardiogenic shock in may 2022 w/ viability suggesting against salvagability, LV thrombus, medically managed, who presents with hemoptysis in the setting of lung nodule (possibly malignant) and a supratherapeutic INR.     Plan:  - Repeat TTE pending  - Hold Coumadin (goal INR 2-3). Noted as per ACC, DOAC use is associated with higher rates of stroke and systemic embolism than warfarin for LV thrombi in a multicenter, retrospective analysis (but prospective studies are needed to directly compare DOAC and warfarin therapy for LV thrombi).  Patients with documented resolution of LV thrombus by echo may still experience stroke or systemic embolism.  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 Continue atorva 80, metop 25mg XL, and maintenance lasix 40mg bid for now. He is currently well compensated. Will stay the course for now  * Noted that if other GDMT i.e. entresto were to be started, which can certainly be trialed this hospitalization (24/26 bid to start), he would likely require a lower maintenance dose of diuretic. This medication, along with SGLT2i, MRCA, can be discussed with patient prior to trying as his BPs are currently 100s-110s systolic   - Will defer to primary team re: inpatient vs. outpatient biopsy of lung lesion     This patient was seen and examined personally by me and the plan was discussed with the fellow and/or resident above. Amendments were made as necessary to the above. Agree with the excellent note and plan above. Pending TTE; trending INR.    Rafael Man MD, MPhil, [facility]  Cardiologist, [facility]  ; [facility] and [facility]  Email: [email or web address] on-service contact/call information, go to [email or web address] and use "[email or web address]" to login.  Outpatient Cardiology appointments, call [phone] to arrange with a colleague; I do not have outpatient Cardiology clinic.

## 2022-08-24 NOTE — PROGRESS NOTE ADULT - SUBJECTIVE AND OBJECTIVE BOX
Patient is a 77y old  Male who presents with a chief complaint of hemoptysis (24 Aug 2022 13:13)    Jeronimo Hammond MD   [facility] Medicine   Pager [phone]  Reachable on [employer] Teams     SUBJECTIVE / OVERNIGHT EVENTS:  Patient seen and examined this morning. He states that he had one episode of bloody phlegm yesterday. Had 2 more episodes of phlegm production and     MEDICATIONS  (STANDING):  atorvastatin 80 milliGRAM(s) Oral at bedtime  furosemide    Tablet 40 milliGRAM(s) Oral two times a day  metoprolol succinate ER 25 milliGRAM(s) Oral daily  mirtazapine 15 milliGRAM(s) Oral at bedtime  tamsulosin 0.4 milliGRAM(s) Oral at bedtime  venlafaxine XR. 150 milliGRAM(s) Oral daily  warfarin 3 milliGRAM(s) Oral at bedtime    MEDICATIONS  (PRN):  acetaminophen     Tablet .. 650 milliGRAM(s) Oral every 6 hours PRN Temp greater or equal to 38C (100.4F), Mild Pain (1 - 3)  melatonin 3 milliGRAM(s) Oral at bedtime PRN Insomnia      Vital Signs Last 24 Hrs  T(C): 36.6 (24 Aug 2022 12:40), Max: 36.7 (24 Aug 2022 06:26)  T(F): 97.8 (24 Aug 2022 12:40), Max: 98 (24 Aug 2022 06:26)  HR: 80 (24 Aug 2022 12:40) (80 - 84)  BP: 93/58 (24 Aug 2022 12:40) (93/58 - 116/60)  BP(mean): --  RR: 18 (24 Aug 2022 12:40) (18 - 18)  SpO2: 99% (24 Aug 2022 12:40) (98% - 100%)  CAPILLARY BLOOD GLUCOSE        I&O's Summary    24 Aug 2022 07:01  -  24 Aug 2022 16:20  --------------------------------------------------------  IN: 0 mL / OUT: 800 mL / NET: -800 mL      General: elderly man sitting up in on side of bed, appears well in NAD, awake and alert  Eyes: conjunctiva clear, nonicteric sclera  HENMT: MMM   Respiratory: No respiratory distress, CTABL  Cardiovascular: S1,S2; Regular rate and rhythm; No m/g/r. 2+ peripheral pulses  Gastrointestinal: Soft, Nontender, Nondistended; +BS.   Extremities: No c/c/e; warm to touch  Skin: No rashes, No erythema   Psych: AAOx3; appropriate mood and affect      LABS:                        12.6   6.81  )-----------( 166      ( 24 Aug 2022 06:41 )             38.2     08-24    138  |  105  |  38<H>  ----------------------------<  103<H>  4.3   |  22  |  1.31<H>    Ca    9.0      24 Aug 2022 06:41  Phos  3.9     08-24  Mg     2.10     08-24    TPro  6.4  /  Alb  3.6  /  TBili  1.2  /  DBili  x   /  AST  45<H>  /  ALT  50<H>  /  AlkPhos  118  08-23    PT/INR - ( 24 Aug 2022 06:41 )   PT: 29.2 sec;   INR: 2.49 ratio                   RADIOLOGY & ADDITIONAL TESTS:    Imaging Personally Reviewed:    Consultant(s) Notes Reviewed:      Care Discussed with Consultants/Other Providers:

## 2022-08-24 NOTE — PROGRESS NOTE ADULT - SUBJECTIVE AND OBJECTIVE BOX
Papi Gan MD  Cardiology Fellow  [phone]  All Cardiology service information can be found 24/7 on [email or web address], password: [email or web address]    Patient seen and examined at bedside.  No events overnight. Did say he spit up more phlegm with scant blood. Instructed to save in cup next time  Tele with PVcs bigeminy and trigeminy   Holding Coumadin, pending TTE     Review Of Systems: No chest pain, shortness of breath, or palpitations            Current Meds:  acetaminophen     Tablet .. 650 milliGRAM(s) Oral every 6 hours PRN  atorvastatin 80 milliGRAM(s) Oral at bedtime  furosemide    Tablet 40 milliGRAM(s) Oral two times a day  melatonin 3 milliGRAM(s) Oral at bedtime PRN  metoprolol succinate ER 25 milliGRAM(s) Oral daily  mirtazapine 15 milliGRAM(s) Oral at bedtime  tamsulosin 0.4 milliGRAM(s) Oral at bedtime  venlafaxine XR. 150 milliGRAM(s) Oral daily      Vitals:  T(F): 98 (08-24), Max: 98 (08-24)  HR: 80 (08-24) (79 - 84)  BP: 107/59 (08-24) (104/72 - 116/60)  RR: 18 (08-24)  SpO2: 98% (08-24)  I&O's Summary      Physical Exam:  GENERAL: No acute distress, well-developed. Appears stated age  HEAD:  Atraumatic, Normocephalic  ENT: EOMI, PERRLA, conjunctiva and sclera clear, Neck supple, No JVD, moist mucosa  CHEST/LUNG: Clear to auscultation bilaterally; No wheeze, equal breath sounds bilaterally   BACK: No spinal tenderness  HEART: Regular rate and rhythm; No murmurs, rubs, or gallops  ABDOMEN: Soft, Nontender, Nondistended; Bowel sounds present  EXTREMITIES:  No clubbing, cyanosis, or edema  PSYCH: Nl behavior, nl affect  NEUROLOGY: AAOx3, non-focal, cranial nerves intact  SKIN: Normal color, No rashes or lesions                          12.4   6.91  )-----------( 159      ( 23 Aug 2022 06:50 )             39.1     08-23    139  |  107  |  36<H>  ----------------------------<  96  4.9   |  23  |  1.20    Ca    8.8      23 Aug 2022 06:50  Phos  3.3     08-23  Mg     2.10     08-23    TPro  6.4  /  Alb  3.6  /  TBili  1.2  /  DBili  x   /  AST  45<H>  /  ALT  50<H>  /  AlkPhos  118  08-23    PT/INR - ( 23 Aug 2022 06:50 )   PT: 47.5 sec;   INR: 4.04 ratio         PTT - ( 22 Aug 2022 14:00 )  PTT:43.3 sec      EKG: PVC, sinus rhythm, first degree AV block, left axis deviation. Anterior Q waves and poor R wave progression     TTE:  Dimensions:    Normal Values:  LA:     4.3    2.0 - 4.0 cm  Ao:     3.6    2.0 - 3.8 cm  SEPTUM: 1.3    0.6 - 1.2 cm  PWT:  0.9    0.6 - 1.1 cm  LVIDd:  5.7    3.0 - 5.6 cm  LVIDs:  5.0    1.8 - 4.0 cm  Derived variables:  LVMI: 122 g/m2  RWT: 0.31  Fractional short: 12 %  EF (Modified Pizarro Rule): 30 %  ------------------------------------------------------------------------  Observations:  Mitral Valve: Tethered mitral valve leaflets with normal  opening. Moderate mitral regurgitation.  Aortic Valve/Aorta: Calcified trileaflet aortic valve with  normal opening. Minimal aortic regurgitation.  Aortic Root: 3.6 cm.  Left Atrium: Moderately dilated left atrium.  LA volume  index = 42 cc/m2.  Left Ventricle: Endocardial visualization enhanced with  intravenous injection of Ultrasonic Enhancing Agent  (Lumason).  Severe global left ventricular systolic  dysfunction.  Left ventricular thrombus measuring 1.0 by  1.3 cm seen in LV apex. The mid anterior septum, the basal  anterior septum, the mid anterior wall, and the mid  inferoseptum are akinetic.  Mild left ventricular  enlargement. Severe reversible diastolic dysfunction (Stage  III).  Right Heart: Moderate right atrial enlargement. Right  ventricular enlargement with decreased right ventricular  systolic function. Normal tricuspid valve. Mild tricuspid  regurgitation. Normal pulmonic valve. Mild pulmonic  regurgitation.  Pericardium/Pleura: Small pericardial effusion. No evidence  of right atrial or right ventricular collapse. No  echocardiographic evidence of pericardial tamponade.  Bilateral pleural effusions.  Hemodynamic: Estimated right atrial pressure is 8 mm Hg.  Estimated right ventricular systolic pressure equals 35 mm  Hg, assuming right atrial pressure equals 8 mm Hg,  consistent with borderline pulmonary hypertension.  ------------------------------------------------------------------------  Conclusions:  1. Tethered mitral valve leaflets with normal opening.  Moderate mitral regurgitation.  2. Minimal aortic regurgitation.  3. Endocardial visualization enhanced with intravenous  injection of Ultrasonic Enhancing Agent (Lumason). Severe  global left ventricular systolic dysfunction.  Left  ventricular thrombus measuring 1.0 by 1.3 cm seen in LV  apex.  4. Severe reversible diastolic dysfunction (Stage III).  5. Right ventricular enlargement with decreased right  ventricular systolic function.  6. Small pericardial effusion. No evidence of right atrial  or right ventricular collapse. No echocardiographic  evidence of pericardial tamponade.  7. Bilateral pleural effusions.  *** Compared with echocardiogram of 5/12/2022, the trombus  is new.  Results communicated to Bradley Herbert MD.            New ECG(s): Personally reviewed    Echo:    Stress Testing:     Cath:    Imaging:    Interpretation of Telemetry:

## 2022-08-24 NOTE — PROGRESS NOTE ADULT - SUBJECTIVE AND OBJECTIVE BOX
Pt denies hemoptysis for 24 hours, denies CP or SOB.     ICU Vital Signs Last 24 Hrs  T(C): 36.6 (24 Aug 2022 12:40), Max: 36.7 (24 Aug 2022 06:26)  T(F): 97.8 (24 Aug 2022 12:40), Max: 98 (24 Aug 2022 06:26)  HR: 80 (24 Aug 2022 12:40) (80 - 84)  BP: 93/58 (24 Aug 2022 12:40) (93/58 - 116/60)  BP(mean): --  ABP: --  ABP(mean): --  RR: 18 (24 Aug 2022 12:40) (18 - 18)  SpO2: 99% (24 Aug 2022 12:40) (98% - 100%)    O2 Parameters below as of 24 Aug 2022 12:40  Patient On (Oxygen Delivery Method): room air        I&O's Detail    24 Aug 2022 07:01  -  24 Aug 2022 13:14  --------------------------------------------------------  IN:  Total IN: 0 mL    OUT:    Voided (mL): 800 mL  Total OUT: 800 mL    Total NET: -800 mL    MEDICATIONS  (STANDING):  atorvastatin 80 milliGRAM(s) Oral at bedtime  furosemide    Tablet 40 milliGRAM(s) Oral two times a day  metoprolol succinate ER 25 milliGRAM(s) Oral daily  mirtazapine 15 milliGRAM(s) Oral at bedtime  tamsulosin 0.4 milliGRAM(s) Oral at bedtime  venlafaxine XR. 150 milliGRAM(s) Oral daily  warfarin 2.5 milliGRAM(s) Oral once    MEDICATIONS  (PRN):  acetaminophen     Tablet .. 650 milliGRAM(s) Oral every 6 hours PRN Temp greater or equal to 38C (100.4F), Mild Pain (1 - 3)  melatonin 3 milliGRAM(s) Oral at bedtime PRN Insomnia                            12.6   6.81  )-----------( 166      ( 24 Aug 2022 06:41 )             38.2     PT/INR - ( 24 Aug 2022 06:41 )   PT: 29.2 sec;   INR: 2.49 ratio         PTT - ( 22 Aug 2022 14:00 )  PTT:43.3 sec    08-24    138  |  105  |  38<H>  ----------------------------<  103<H>  4.3   |  22  |  1.31<H>    Ca    9.0      24 Aug 2022 06:41  Phos  3.9     08-24  Mg     2.10     08-24    TPro  6.4  /  Alb  3.6  /  TBili  1.2  /  DBili  x   /  AST  45<H>  /  ALT  50<H>  /  AlkPhos  118  08-23    General: WN/WD NAD  Neurology: A&Ox3, nonfocal, GARCIA x 4  Eyes: PERRLA/ EOMI, Gross vision intact  ENT/Neck: Neck supple, trachea midline, No JVD, Gross hearing intact  Respiratory: CTA B/L, No wheezing, rales, rhonchi  CV: RRR, S1S2, no murmurs, rubs or gallops  Abdominal: Soft, NT, ND +BS,   Extremities: No edema, + peripheral pulses  Skin: No Rashes, Hematoma, Ecchymosis

## 2022-08-25 ENCOUNTER — APPOINTMENT (OUTPATIENT)
Dept: CARDIOLOGY | Facility: CLINIC | Age: 77
End: 2022-08-25

## 2022-08-25 ENCOUNTER — TRANSCRIPTION ENCOUNTER (OUTPATIENT)
Age: 77
End: 2022-08-25

## 2022-08-25 DIAGNOSIS — I25.10 ATHEROSCLEROTIC HEART DISEASE OF NATIVE CORONARY ARTERY WITHOUT ANGINA PECTORIS: ICD-10-CM

## 2022-08-25 LAB
ANION GAP SERPL CALC-SCNC: 13 MMOL/L — SIGNIFICANT CHANGE UP (ref 7–14)
APTT BLD: 33.3 SEC — SIGNIFICANT CHANGE UP (ref 27–36.3)
BUN SERPL-MCNC: 32 MG/DL — HIGH (ref 7–23)
CALCIUM SERPL-MCNC: 9.3 MG/DL — SIGNIFICANT CHANGE UP (ref 8.4–10.5)
CHLORIDE SERPL-SCNC: 106 MMOL/L — SIGNIFICANT CHANGE UP (ref 98–107)
CO2 SERPL-SCNC: 21 MMOL/L — LOW (ref 22–31)
CREAT SERPL-MCNC: 1.13 MG/DL — SIGNIFICANT CHANGE UP (ref 0.5–1.3)
EGFR: 67 ML/MIN/1.73M2 — SIGNIFICANT CHANGE UP
GLUCOSE SERPL-MCNC: 91 MG/DL — SIGNIFICANT CHANGE UP (ref 70–99)
HCT VFR BLD CALC: 39.2 % — SIGNIFICANT CHANGE UP (ref 39–50)
HGB BLD-MCNC: 13.2 G/DL — SIGNIFICANT CHANGE UP (ref 13–17)
INR BLD: 1.82 RATIO — HIGH (ref 0.88–1.16)
MCHC RBC-ENTMCNC: 31.2 PG — SIGNIFICANT CHANGE UP (ref 27–34)
MCHC RBC-ENTMCNC: 33.7 GM/DL — SIGNIFICANT CHANGE UP (ref 32–36)
MCV RBC AUTO: 92.7 FL — SIGNIFICANT CHANGE UP (ref 80–100)
NRBC # BLD: 0 /100 WBCS — SIGNIFICANT CHANGE UP (ref 0–0)
NRBC # FLD: 0 K/UL — SIGNIFICANT CHANGE UP (ref 0–0)
PLATELET # BLD AUTO: 187 K/UL — SIGNIFICANT CHANGE UP (ref 150–400)
POTASSIUM SERPL-MCNC: 4.1 MMOL/L — SIGNIFICANT CHANGE UP (ref 3.5–5.3)
POTASSIUM SERPL-SCNC: 4.1 MMOL/L — SIGNIFICANT CHANGE UP (ref 3.5–5.3)
PROTHROM AB SERPL-ACNC: 21.2 SEC — HIGH (ref 10.5–13.4)
RBC # BLD: 4.23 M/UL — SIGNIFICANT CHANGE UP (ref 4.2–5.8)
RBC # FLD: 15.9 % — HIGH (ref 10.3–14.5)
SODIUM SERPL-SCNC: 140 MMOL/L — SIGNIFICANT CHANGE UP (ref 135–145)
WBC # BLD: 6.16 K/UL — SIGNIFICANT CHANGE UP (ref 3.8–10.5)
WBC # FLD AUTO: 6.16 K/UL — SIGNIFICANT CHANGE UP (ref 3.8–10.5)

## 2022-08-25 PROCEDURE — 99233 SBSQ HOSP IP/OBS HIGH 50: CPT

## 2022-08-25 PROCEDURE — 99231 SBSQ HOSP IP/OBS SF/LOW 25: CPT

## 2022-08-25 PROCEDURE — 93306 TTE W/DOPPLER COMPLETE: CPT | Mod: 26

## 2022-08-25 PROCEDURE — 71045 X-RAY EXAM CHEST 1 VIEW: CPT | Mod: 26

## 2022-08-25 RX ADMIN — Medication 150 MILLIGRAM(S): at 22:01

## 2022-08-25 RX ADMIN — TAMSULOSIN HYDROCHLORIDE 0.4 MILLIGRAM(S): 0.4 CAPSULE ORAL at 22:01

## 2022-08-25 RX ADMIN — Medication 40 MILLIGRAM(S): at 04:52

## 2022-08-25 RX ADMIN — ATORVASTATIN CALCIUM 80 MILLIGRAM(S): 80 TABLET, FILM COATED ORAL at 22:00

## 2022-08-25 RX ADMIN — Medication 40 MILLIGRAM(S): at 13:55

## 2022-08-25 RX ADMIN — MIRTAZAPINE 15 MILLIGRAM(S): 45 TABLET, ORALLY DISINTEGRATING ORAL at 22:01

## 2022-08-25 NOTE — PROGRESS NOTE ADULT - SUBJECTIVE AND OBJECTIVE BOX
Papi Gan MD  Cardiology Fellow  [phone]  All Cardiology service information can be found 24/7 on [email or web address], password: [email or web address]    Patient seen and examined at bedside.  No events  Patient feels fine  TTE yesterday without obvious clot, but not performed with definity. Will repeat limited study. Patient also concerned about "down" ef of 25% comparative to his study from 5/2022 with EF 30%. Definity will also further clarify EF     Review Of Systems: No chest pain, shortness of breath, or palpitations            Current Meds:  acetaminophen     Tablet .. 650 milliGRAM(s) Oral every 6 hours PRN  atorvastatin 80 milliGRAM(s) Oral at bedtime  furosemide    Tablet 40 milliGRAM(s) Oral two times a day  melatonin 3 milliGRAM(s) Oral at bedtime PRN  metoprolol succinate ER 25 milliGRAM(s) Oral daily  mirtazapine 15 milliGRAM(s) Oral at bedtime  tamsulosin 0.4 milliGRAM(s) Oral at bedtime  venlafaxine XR. 150 milliGRAM(s) Oral daily      Vitals:  T(F): 97.9 (08-25), Max: 98.1 (08-25)  HR: 75 (08-25) (73 - 80)  BP: 101/62 (08-25) (93/58 - 118/56)  RR: 18 (08-25)  SpO2: 100% (08-25)  I&O's Summary    24 Aug 2022 07:01  -  25 Aug 2022 07:00  --------------------------------------------------------  IN: 200 mL / OUT: 1400 mL / NET: -1200 mL        Physical Exam:  GENERAL: No acute distress, well-developed. Appears stated age  HEAD:  Atraumatic, Normocephalic  ENT: EOMI, PERRLA, conjunctiva and sclera clear, Neck supple, No JVD, moist mucosa  CHEST/LUNG: Clear to auscultation bilaterally; No wheeze, equal breath sounds bilaterally   BACK: No spinal tenderness  HEART: Regular rate and rhythm; No murmurs, rubs, or gallops  ABDOMEN: Soft, Nontender, Nondistended; Bowel sounds present  EXTREMITIES:  No clubbing, cyanosis, or edema  PSYCH: Nl behavior, nl affect  NEUROLOGY: AAOx3, non-focal, cranial nerves intact  SKIN: Normal color, No rashes or lesions                          12.6   6.81  )-----------( 166      ( 24 Aug 2022 06:41 )             38.2     08-24    138  |  105  |  38<H>  ----------------------------<  103<H>  4.3   |  22  |  1.31<H>    Ca    9.0      24 Aug 2022 06:41  Phos  3.9     08-24  Mg     2.10     08-24      PT/INR - ( 24 Aug 2022 06:41 )   PT: 29.2 sec;   INR: 2.49 ratio             TTE:  Dimensions:    Normal Values:  LA:     4.3    2.0 - 4.0 cm  Ao:     3.6    2.0 - 3.8 cm  SEPTUM: 1.3    0.6 - 1.2 cm  PWT:  0.9    0.6 - 1.1 cm  LVIDd:  5.7    3.0 - 5.6 cm  LVIDs:  5.0    1.8 - 4.0 cm  Derived variables:  LVMI: 122 g/m2  RWT: 0.31  Fractional short: 12 %  EF (Modified Pizarro Rule): 30 %  ------------------------------------------------------------------------  Observations:  Mitral Valve: Tethered mitral valve leaflets with normal  opening. Moderate mitral regurgitation.  Aortic Valve/Aorta: Calcified trileaflet aortic valve with  normal opening. Minimal aortic regurgitation.  Aortic Root: 3.6 cm.  Left Atrium: Moderately dilated left atrium.  LA volume  index = 42 cc/m2.  Left Ventricle: Endocardial visualization enhanced with  intravenous injection of Ultrasonic Enhancing Agent  (Lumason).  Severe global left ventricular systolic  dysfunction.  Left ventricular thrombus measuring 1.0 by  1.3 cm seen in LV apex. The mid anterior septum, the basal  anterior septum, the mid anterior wall, and the mid  inferoseptum are akinetic.  Mild left ventricular  enlargement. Severe reversible diastolic dysfunction (Stage  III).  Right Heart: Moderate right atrial enlargement. Right  ventricular enlargement with decreased right ventricular  systolic function. Normal tricuspid valve. Mild tricuspid  regurgitation. Normal pulmonic valve. Mild pulmonic  regurgitation.  Pericardium/Pleura: Small pericardial effusion. No evidence  of right atrial or right ventricular collapse. No  echocardiographic evidence of pericardial tamponade.  Bilateral pleural effusions.  Hemodynamic: Estimated right atrial pressure is 8 mm Hg.  Estimated right ventricular systolic pressure equals 35 mm  Hg, assuming right atrial pressure equals 8 mm Hg,  consistent with borderline pulmonary hypertension.  ------------------------------------------------------------------------  Conclusions:  1. Tethered mitral valve leaflets with normal opening.  Moderate mitral regurgitation.  2. Minimal aortic regurgitation.  3. Endocardial visualization enhanced with intravenous  injection of Ultrasonic Enhancing Agent (Lumason). Severe  global left ventricular systolic dysfunction.  Left  ventricular thrombus measuring 1.0 by 1.3 cm seen in LV  apex.  4. Severe reversible diastolic dysfunction (Stage III).  5. Right ventricular enlargement with decreased right  ventricular systolic function.  6. Small pericardial effusion. No evidence of right atrial  or right ventricular collapse. No echocardiographic  evidence of pericardial tamponade.  7. Bilateral pleural effusions.  *** Compared with echocardiogram of 5/12/2022, the trombus  is new.  Results communicated to Bradley Herbert MD.    8/24/22:  DIMENSIONS:  Dimensions:     Normal Values:  LA:     3.2 cm    2.0 - 4.0 cm  Ao:     3.5 cm    2.0 - 3.8 cm  SEPTUM: 0.9 cm    0.6 - 1.2 cm  PWT:    0.8cm    0.6 - 1.1 cm  LVIDd:  6.0 cm    3.0 - 5.6 cm  LVIDs:  5.3 cm    1.8 - 4.0 cm  Derived Variables:  LVMI: 98 g/m2  RWT: 0.26  Fractional short: 12 %  Ejection Fraction (Pizarro Rule): 25 %  ------------------------------------------------------------------------  OBSERVATIONS:  Mitral Valve: Mitral annular calcification, otherwise  normal mitral valve. Moderate mitral regurgitation.  Aortic Root: Normal aortic root.  Aortic Valve: Calcified trileaflet aortic valve with normal  opening. Mild aortic regurgitation.  Left Atrium: Mildly dilated left atrium.  LA volume index =  40 cc/m2.  Left Ventricle: Severe segmental left ventricular systolic  dysfunction.  Hypokinesis of the apex, septum, mid to  distal anterior wall, and distal inferior wall. Mild left  ventricular enlargement. Severe diastolic dysfunction.  Right Heart: Normal right atrium. Normal right ventricular  size and function. Normal tricuspid valve.  Mild tricuspid  regurgitation. Normal pulmonic valve. Mild pulmonic  regurgitation.  Pericardium/PleuraNormal pericardium with no pericardial  effusion.  ------------------------------------------------------------------------  CONCLUSIONS:  1. Mitral annular calcification, otherwise normal mitral  valve. Moderate mitral regurgitation.  2. Calcified trileaflet aortic valve with normal opening.  Mild aortic regurgitation.  3. Mildly dilated left atrium.  LA volume index = 40 cc/m2.  4. Mild left ventricular enlargement.  5. Severe segmental left ventricular systolic dysfunction.  Hypokinesis of the apex, septum, mid to distal anterior  wall, and distal inferior wall.  6. Severe diastolic dysfunction.  7. Normal right ventricular size and function.     Papi Gan MD  Cardiology Fellow  [phone]  All Cardiology service information can be found 24/7 on [email or web address], password: [email or web address]    Patient seen and examined at bedside.  No events  Patient feels fine  TTE yesterday without obvious clot, but not performed with definity. Will repeat limited study. Patient also concerned about "down" ef of 25% comparative to his study from 5/2022 with EF 30%.   Limited study w/ definity showed no clot    Review Of Systems: No chest pain, shortness of breath, or palpitations            Current Meds:  acetaminophen     Tablet .. 650 milliGRAM(s) Oral every 6 hours PRN  atorvastatin 80 milliGRAM(s) Oral at bedtime  furosemide    Tablet 40 milliGRAM(s) Oral two times a day  melatonin 3 milliGRAM(s) Oral at bedtime PRN  metoprolol succinate ER 25 milliGRAM(s) Oral daily  mirtazapine 15 milliGRAM(s) Oral at bedtime  tamsulosin 0.4 milliGRAM(s) Oral at bedtime  venlafaxine XR. 150 milliGRAM(s) Oral daily      Vitals:  T(F): 97.9 (08-25), Max: 98.1 (08-25)  HR: 75 (08-25) (73 - 80)  BP: 101/62 (08-25) (93/58 - 118/56)  RR: 18 (08-25)  SpO2: 100% (08-25)  I&O's Summary    24 Aug 2022 07:01  -  25 Aug 2022 07:00  --------------------------------------------------------  IN: 200 mL / OUT: 1400 mL / NET: -1200 mL        Physical Exam:  GENERAL: No acute distress, well-developed. Appears stated age  HEAD:  Atraumatic, Normocephalic  ENT: EOMI, PERRLA, conjunctiva and sclera clear, Neck supple, No JVD, moist mucosa  CHEST/LUNG: Clear to auscultation bilaterally; No wheeze, equal breath sounds bilaterally   BACK: No spinal tenderness  HEART: Regular rate and rhythm; No murmurs, rubs, or gallops  ABDOMEN: Soft, Nontender, Nondistended; Bowel sounds present  EXTREMITIES:  No clubbing, cyanosis, or edema  PSYCH: Nl behavior, nl affect  NEUROLOGY: AAOx3, non-focal, cranial nerves intact  SKIN: Normal color, No rashes or lesions                          12.6   6.81  )-----------( 166      ( 24 Aug 2022 06:41 )             38.2     08-24    138  |  105  |  38<H>  ----------------------------<  103<H>  4.3   |  22  |  1.31<H>    Ca    9.0      24 Aug 2022 06:41  Phos  3.9     08-24  Mg     2.10     08-24      PT/INR - ( 24 Aug 2022 06:41 )   PT: 29.2 sec;   INR: 2.49 ratio             TTE:  Dimensions:    Normal Values:  LA:     4.3    2.0 - 4.0 cm  Ao:     3.6    2.0 - 3.8 cm  SEPTUM: 1.3    0.6 - 1.2 cm  PWT:  0.9    0.6 - 1.1 cm  LVIDd:  5.7    3.0 - 5.6 cm  LVIDs:  5.0    1.8 - 4.0 cm  Derived variables:  LVMI: 122 g/m2  RWT: 0.31  Fractional short: 12 %  EF (Modified Pizarro Rule): 30 %  ------------------------------------------------------------------------  Observations:  Mitral Valve: Tethered mitral valve leaflets with normal  opening. Moderate mitral regurgitation.  Aortic Valve/Aorta: Calcified trileaflet aortic valve with  normal opening. Minimal aortic regurgitation.  Aortic Root: 3.6 cm.  Left Atrium: Moderately dilated left atrium.  LA volume  index = 42 cc/m2.  Left Ventricle: Endocardial visualization enhanced with  intravenous injection of Ultrasonic Enhancing Agent  (Lumason).  Severe global left ventricular systolic  dysfunction.  Left ventricular thrombus measuring 1.0 by  1.3 cm seen in LV apex. The mid anterior septum, the basal  anterior septum, the mid anterior wall, and the mid  inferoseptum are akinetic.  Mild left ventricular  enlargement. Severe reversible diastolic dysfunction (Stage  III).  Right Heart: Moderate right atrial enlargement. Right  ventricular enlargement with decreased right ventricular  systolic function. Normal tricuspid valve. Mild tricuspid  regurgitation. Normal pulmonic valve. Mild pulmonic  regurgitation.  Pericardium/Pleura: Small pericardial effusion. No evidence  of right atrial or right ventricular collapse. No  echocardiographic evidence of pericardial tamponade.  Bilateral pleural effusions.  Hemodynamic: Estimated right atrial pressure is 8 mm Hg.  Estimated right ventricular systolic pressure equals 35 mm  Hg, assuming right atrial pressure equals 8 mm Hg,  consistent with borderline pulmonary hypertension.  ------------------------------------------------------------------------  Conclusions:  1. Tethered mitral valve leaflets with normal opening.  Moderate mitral regurgitation.  2. Minimal aortic regurgitation.  3. Endocardial visualization enhanced with intravenous  injection of Ultrasonic Enhancing Agent (Lumason). Severe  global left ventricular systolic dysfunction.  Left  ventricular thrombus measuring 1.0 by 1.3 cm seen in LV  apex.  4. Severe reversible diastolic dysfunction (Stage III).  5. Right ventricular enlargement with decreased right  ventricular systolic function.  6. Small pericardial effusion. No evidence of right atrial  or right ventricular collapse. No echocardiographic  evidence of pericardial tamponade.  7. Bilateral pleural effusions.  *** Compared with echocardiogram of 5/12/2022, the trombus  is new.  Results communicated to Bradley Herbert MD.    8/24/22:  DIMENSIONS:  Dimensions:     Normal Values:  LA:     3.2 cm    2.0 - 4.0 cm  Ao:     3.5 cm    2.0 - 3.8 cm  SEPTUM: 0.9 cm    0.6 - 1.2 cm  PWT:    0.8cm    0.6 - 1.1 cm  LVIDd:  6.0 cm    3.0 - 5.6 cm  LVIDs:  5.3 cm    1.8 - 4.0 cm  Derived Variables:  LVMI: 98 g/m2  RWT: 0.26  Fractional short: 12 %  Ejection Fraction (Pizarro Rule): 25 %  ------------------------------------------------------------------------  OBSERVATIONS:  Mitral Valve: Mitral annular calcification, otherwise  normal mitral valve. Moderate mitral regurgitation.  Aortic Root: Normal aortic root.  Aortic Valve: Calcified trileaflet aortic valve with normal  opening. Mild aortic regurgitation.  Left Atrium: Mildly dilated left atrium.  LA volume index =  40 cc/m2.  Left Ventricle: Severe segmental left ventricular systolic  dysfunction.  Hypokinesis of the apex, septum, mid to  distal anterior wall, and distal inferior wall. Mild left  ventricular enlargement. Severe diastolic dysfunction.  Right Heart: Normal right atrium. Normal right ventricular  size and function. Normal tricuspid valve.  Mild tricuspid  regurgitation. Normal pulmonic valve. Mild pulmonic  regurgitation.  Pericardium/PleuraNormal pericardium with no pericardial  effusion.  ------------------------------------------------------------------------  CONCLUSIONS:  1. Mitral annular calcification, otherwise normal mitral  valve. Moderate mitral regurgitation.  2. Calcified trileaflet aortic valve with normal opening.  Mild aortic regurgitation.  3. Mildly dilated left atrium.  LA volume index = 40 cc/m2.  4. Mild left ventricular enlargement.  5. Severe segmental left ventricular systolic dysfunction.  Hypokinesis of the apex, septum, mid to distal anterior  wall, and distal inferior wall.  6. Severe diastolic dysfunction.  7. Normal right ventricular size and function.

## 2022-08-25 NOTE — PROGRESS NOTE ADULT - SUBJECTIVE AND OBJECTIVE BOX
Patient seen at bedside, resting comfortably in bed, in NAD. Patient states that he is doing well this morning. Denies any episodes of hemoptysis overnight. Denies any new onset chest pain, SOB, dyspnea, n/v/d, fevers or chills.    Vital Signs:  Vital Signs Last 24 Hrs  T(C): 36.6 (08-25-22 @ 05:00), Max: 36.7 (08-25-22 @ 00:29)  T(F): 97.9 (08-25-22 @ 05:00), Max: 98.1 (08-25-22 @ 00:29)  HR: 75 (08-25-22 @ 05:00) (73 - 78)  BP: 101/62 (08-25-22 @ 05:00) (95/64 - 118/56)  RR: 18 (08-25-22 @ 05:00) (18 - 18)  SpO2: 100% (08-25-22 @ 05:00) (96% - 100%) on (O2)    Telemetry/Alarms:  General: WN/WD NAD  Neurology: A&Ox3, nonfocal, GARCIA x 4  Eyes: PERRLA/ EOMI, Gross vision intact  ENT/Neck: Neck supple, trachea midline, No JVD, Gross hearing intact  Respiratory: CTA B/L, No wheezing, rales, rhonchi  CV: RRR, S1S2, no murmurs, rubs or gallops  Abdominal: Soft, NT, ND +BS,   Extremities: No edema, + peripheral pulses  Skin: No Rashes, Hematoma, Ecchymosis  Psych: Appropriate affect  Relevant labs, radiology and Medications reviewed    < from: TTE with Doppler (w/Cont) (08.25.22 @ 07:26) >    Patient name: ELYSE MALAVE  YOB: 1945   Age: 77 (M)   MR#: [id number]  Study Date: 8/25/2022  Location: [id number]: Shira Rebolledo RDCS  Study quality: Limited  Referring Physician: Jeronimo Hammond MD  Blood Pressure: 101/62 mmHg  Height: 183 cm  Weight: 83 kg  BSA: 2.1 m2  ------------------------------------------------------------------------  PROCEDURE: Transthoracic echocardiogram with 2-D, M-Mode  and complete spectral and color flow Doppler.  Intravenous ultrasound enhancing agent was administered for  improved left ventricular endocardial border definition.  Following the intravenous injection of ultrasound enhancing  agent, harmonic imaging was performed.  INDICATION: Heart failure, unspecified (I50.9)  ------------------------------------------------------------------------  OBSERVATIONS:  Left Ventricle: Severe global left ventricular systolic  dysfunction. Endocardial visualization enhanced with  intravenous injection of echo contrast (Definity).  ------------------------------------------------------------------------  CONCLUSIONS:  1. Severe global left ventricular systolic dysfunction.  Endocardial visualization enhanced with intravenous  injection of echo contrast (Definity).  ------------------------------------------------------------------------  Confirmed on  8/25/2022 - 12:35:09 by NEW Andersen    < end of copied text >      TTE 8/24  < from: Transthoracic Echocardiogram (08.24.22 @ 11:45) >  Patient name: ELYSE MALAVE  YOB: 1945   Age: 77 (M)   MR#: [id number]  Study Date: 8/24/2022  Location: [id number]: MARYAN Gilbert  Study quality: Technically good  Referring Physician: Jeronimo Hammond MD  Blood Pressure: 107/59 mmHg  Height: 183 cm  Weight: 83 kg  BSA: 2.1 m2  ------------------------------------------------------------------------  PROCEDURE: Transthoracic echocardiogram with 2-D, M-Mode  and complete spectral and color flow Doppler.  INDICATION: Shock, unspecified (R57.9)  ------------------------------------------------------------------------  DIMENSIONS:  Dimensions:     Normal Values:  LA:     3.2 cm    2.0 - 4.0 cm  Ao:     3.5 cm    2.0 - 3.8 cm  SEPTUM: 0.9 cm    0.6 - 1.2 cm  PWT:    0.8cm    0.6 - 1.1 cm  LVIDd:  6.0 cm    3.0 - 5.6 cm  LVIDs:  5.3 cm    1.8 - 4.0 cm  Derived Variables:  LVMI: 98 g/m2  RWT: 0.26  Fractional short: 12 %  Ejection Fraction (Pizarro Rule): 25 %  ------------------------------------------------------------------------  OBSERVATIONS:  Mitral Valve: Mitral annular calcification, otherwise  normal mitral valve. Moderate mitral regurgitation.  Aortic Root: Normal aortic root.  Aortic Valve: Calcified trileaflet aortic valve with normal  opening. Mild aortic regurgitation.  Left Atrium: Mildly dilated left atrium.  LA volume index =  40 cc/m2.  Left Ventricle: Severe segmental left ventricular systolic  dysfunction.  Hypokinesis of the apex, septum, mid to  distal anterior wall, and distal inferior wall. Mild left  ventricular enlargement. Severe diastolic dysfunction.  Right Heart: Normal right atrium. Normal right ventricular  size and function. Normal tricuspid valve.  Mild tricuspid  regurgitation. Normal pulmonic valve. Mild pulmonic  regurgitation.  Pericardium/PleuraNormal pericardium with no pericardial  effusion.  ------------------------------------------------------------------------  CONCLUSIONS:  1. Mitral annular calcification, otherwise normal mitral  valve. Moderate mitral regurgitation.  2. Calcified trileaflet aortic valve with normal opening.  Mild aortic regurgitation.  3. Mildly dilated left atrium.  LA volume index = 40 cc/m2.  4. Mild left ventricular enlargement.  5. Severe segmental left ventricular systolic dysfunction.  Hypokinesis of the apex, septum, mid to distal anterior  wall, and distal inferior wall.  6. Severe diastolic dysfunction.  7. Normal right ventricular size and function.  *** No previous Echo exam.  ------------------------------------------------------------------------  Confirmed on  8/24/2022 - 12:47:28 by Nick Mead M.D.,  [facility], [facility]  ------------------------------------------------------------------------    < end of copied text >                          13.2   6.16  )-----------( 187      ( 25 Aug 2022 06:21 )             39.2     08-25    140  |  106  |  32<H>  ----------------------------<  91  4.1   |  21<L>  |  1.13    Ca    9.3      25 Aug 2022 06:21  Phos  3.9     08-24  Mg     2.10     08-24      PT/INR - ( 25 Aug 2022 06:21 )   PT: 21.2 sec;   INR: 1.82 ratio         PTT - ( 25 Aug 2022 06:21 )  PTT:33.3 sec  MEDICATIONS  (STANDING):  atorvastatin 80 milliGRAM(s) Oral at bedtime  furosemide    Tablet 40 milliGRAM(s) Oral two times a day  metoprolol succinate ER 25 milliGRAM(s) Oral daily  mirtazapine 15 milliGRAM(s) Oral at bedtime  tamsulosin 0.4 milliGRAM(s) Oral at bedtime  venlafaxine XR. 150 milliGRAM(s) Oral daily    MEDICATIONS  (PRN):  acetaminophen     Tablet .. 650 milliGRAM(s) Oral every 6 hours PRN Temp greater or equal to 38C (100.4F), Mild Pain (1 - 3)  melatonin 3 milliGRAM(s) Oral at bedtime PRN Insomnia    Pertinent Physical Exam  I&O's Summary    24 Aug 2022 07:01  -  25 Aug 2022 07:00  --------------------------------------------------------  IN: 200 mL / OUT: 1400 mL / NET: -1200 mL        Assessment  76 y/o M with pmhx of Depression, bipolar, BPH, hx of STEMI and cardiogenic shock in may 2022, presented to the ED for new onset hemoptysis. The patient 2 days ago had an episode of light red sputum production, then had another episode this morning. Both episodes no clots noted. No profuse bleeding with coughing. Patient on ASA/Plavix post MI, then found to have a LV thrombus which is 1x1.3 cm in size and was started on warfarin (planned to stop 8/26). The patient also found to have a R lung nodule which is suspicious for cancer, is pending IR biopsy but needs to wait until off Warfarin. Patient admitted and found to have supra-therapeutic INR, warfarin and plavix now on hold. Pt no longer having hemoptysis, suspect hemoptysis was from supra-therapeutic INR. Patient had TTE which showed no LV thrombus but EF 25%.  8/25 - patient had echo w/ Definity which found severe LV dysfunction, read did not mention EF or LV thrombus. Dr. Padilla still to speak with Dr. Walden, will f/u. Per Dr. Padilla, no resection this admission, ?IR biopsy vs ?home and outpt biopsy.      Neuro: Pain management  Pulm: Encourage coughing, deep breathing and use of incentive spirometry. Wean off supplemental oxygen as able. Daily CXR.   Cardio: Monitor telemetry/alarms. Severe LV dysfunction on Echo w/ Definity.  Dr. Padilla to speak with Dr. Walden. ?restarting Coumadin  GI: Tolerating diet. Continue stool softeners.  Renal: monitor urine output, supplement electrolytes as needed  Vasc: SCDs for DVT prophylaxis  Heme: Stable H/H. No longer having Hemoptysis, hemodynamically stable.  ID: Off antibiotics. Stable.  Therapy: OOB/ambulate  Dispo: No lung mass resection on this admission. ?IR biopsy as IP vs. ?home and outpatient biopsy  Discussed with Cardiothoracic Team at AM rounds.

## 2022-08-25 NOTE — PROGRESS NOTE ADULT - ASSESSMENT
76 y/o M with pmhx of Depression, bipolar, BPH, hx of STEMI and cardiogenic shock in may 2022 w/ viability suggesting against salvagability, LV thrombus, medically managed, who presents with hemoptysis in the setting of lung nodule (possibly malignant) and a supratherapeutic INR.     Plan:  - Repeat limited TTE today with Definity. This will further clarify clot status, and his ?EF 30>25%. if true decrease in EF, would further argue for titration of GDMT (entresto, MCRA). Otherwise, regarding his ischemic CMP with HFrEF, he is currently without any evidence of decompensation. Continue atorva 80, metop 25mg XL, and maintenance lasix 40mg bid for now. He is currently well compensated.  - Primary team restarting coumadin at lower dose 2.5mg for now  Noted as per ACC, DOAC use is associated with higher rates of stroke and systemic embolism than warfarin for LV thrombi in a multicenter, retrospective analysis (but prospective studies are needed to directly compare DOAC and warfarin therapy for LV thrombi).  Patients with documented resolution of LV thrombus by echo may still experience stroke or systemic embolism.  Source: https://[email or web address]/journals/jamacardiology/article-abstract/1883383  - Will defer to primary team re: inpatient vs. outpatient biopsy of lung lesion    78 y/o M with pmhx of Depression, bipolar, BPH, hx of STEMI and cardiogenic shock in may 2022 w/ viability suggesting against salvagability, LV thrombus, medically managed, who presents with hemoptysis in the setting of lung nodule (possibly malignant) and a supratherapeutic INR.     Plan:  - Repeat limited TTE today with Definity. This will further clarify clot status, and his ?EF 30>25%. if true decrease in EF, would further argue for titration of GDMT (entresto, MCRA). Otherwise, regarding his ischemic CMP with HFrEF, he is currently without any evidence of decompensation. Continue atorva 80, metop 25mg XL, and maintenance lasix 40mg bid for now. He is currently well compensated.  - Primary team restarting coumadin at lower dose 2.5mg for now  Noted as per ACC, DOAC use is associated with higher rates of stroke and systemic embolism than warfarin for LV thrombi in a multicenter, retrospective analysis (but prospective studies are needed to directly compare DOAC and warfarin therapy for LV thrombi).  Patients with documented resolution of LV thrombus by echo may still experience stroke or systemic embolism.  Source: https://jamanetwork.com/journals/jamacardiology/article-abstract/4372893  - Will defer to primary team re: inpatient vs. outpatient biopsy of lung lesion     This patient was seen and examined personally by me and the plan was discussed with the fellow and/or resident above. Amendments were made as necessary to the above. Agree with the excellent note and plan above. TTE without LV Thrombus -- rpt limited study with definity. Well compensated; restart coumadin 2.5mg.    Rafael Man MD, MPhil, [facility]  Cardiologist, [facility]  ; [facility] and [facility]  Email: [email or web address] on-service contact/call information, go to [email or web address] and use "[employer]" to login.  Outpatient Cardiology appointments, call [phone] to arrange with a colleague; I do not have outpatient Cardiology clinic. 76 y/o M with pmhx of Depression, bipolar, BPH, hx of STEMI and cardiogenic shock in may 2022 w/ viability suggesting against salvagability, LV thrombus, medically managed, who presents with hemoptysis in the setting of lung nodule (possibly malignant) and a supratherapeutic INR.     Plan:  - Repeat limited TTE today with Definity negative for clot. Otherwise, regarding his ischemic CMP with HFrEF, he is currently without any evidence of decompensation. Continue atorva 80, metop 25mg XL, and maintenance lasix 40mg bid for now. He is currently well compensated.  - dc Coumadin, patient is s/p 3 months of AC with resolution of clot   Patients with documented resolution of LV thrombus by echo may still experience stroke or systemic embolism. For this reason, paramount that he continue to follow with outpatient cardiology (further for GDMT titration as well)  Source: https://jamanetwork.com/journals/jamacardiology/article-abstract/7147798  - Will defer to primary team re: inpatient vs. outpatient biopsy of lung lesion     We will sign off for now. Please call with questions or concerns or change in clinical condition

## 2022-08-25 NOTE — DISCHARGE NOTE PROVIDER - CARE PROVIDERS DIRECT ADDRESSES
[email or web address]

## 2022-08-25 NOTE — PROGRESS NOTE ADULT - SUBJECTIVE AND OBJECTIVE BOX
Patient is a 77y old  Male who presents with a chief complaint of hemoptysis (25 Aug 2022 07:28)    Jeronimo Hammond MD   [facility]   Pager [phone]  Reachable on Microsoft Teams     SUBJECTIVE / OVERNIGHT EVENTS:  Patient seen and examined this morning with his wife at bedside.   No further episodes of blood in his sputum   Denies any cough fevers, chills, nausea or vomiting.     MEDICATIONS  (STANDING):  atorvastatin 80 milliGRAM(s) Oral at bedtime  furosemide    Tablet 40 milliGRAM(s) Oral two times a day  metoprolol succinate ER 25 milliGRAM(s) Oral daily  mirtazapine 15 milliGRAM(s) Oral at bedtime  tamsulosin 0.4 milliGRAM(s) Oral at bedtime  venlafaxine XR. 150 milliGRAM(s) Oral daily    MEDICATIONS  (PRN):  acetaminophen     Tablet .. 650 milliGRAM(s) Oral every 6 hours PRN Temp greater or equal to 38C (100.4F), Mild Pain (1 - 3)  melatonin 3 milliGRAM(s) Oral at bedtime PRN Insomnia      Vital Signs Last 24 Hrs  T(C): 36.6 (25 Aug 2022 05:00), Max: 36.7 (25 Aug 2022 00:29)  T(F): 97.9 (25 Aug 2022 05:00), Max: 98.1 (25 Aug 2022 00:29)  HR: 75 (25 Aug 2022 05:00) (73 - 80)  BP: 101/62 (25 Aug 2022 05:00) (93/58 - 118/56)  BP(mean): --  RR: 18 (25 Aug 2022 05:00) (18 - 18)  SpO2: 100% (25 Aug 2022 05:00) (96% - 100%)  CAPILLARY BLOOD GLUCOSE        I&O's Summary    24 Aug 2022 07:01  -  25 Aug 2022 07:00  --------------------------------------------------------  IN: 200 mL / OUT: 1400 mL / NET: -1200 mL        General: elderly man sitting up in on side of bed, appears well in NAD, awake and alert  Respiratory: No respiratory distress, CTABL  Cardiovascular: S1,S2; Regular rate and rhythm; No m/g/r.   Gastrointestinal: Soft, Nontender, Nondistended; +BS.   Extremities: No c/c/e; warm to touch  Skin: small purpura on extremities, No rashes, No erythema   Psych: appropriate mood and affect    LABS:                        13.2   6.16  )-----------( 187      ( 25 Aug 2022 06:21 )             39.2     08-25    140  |  106  |  32<H>  ----------------------------<  91  4.1   |  21<L>  |  1.13    Ca    9.3      25 Aug 2022 06:21  Phos  3.9     08-24  Mg     2.10     08-24      PT/INR - ( 25 Aug 2022 06:21 )   PT: 21.2 sec;   INR: 1.82 ratio         PTT - ( 25 Aug 2022 06:21 )  PTT:33.3 sec          RADIOLOGY & ADDITIONAL TESTS:    Imaging Personally Reviewed:    Consultant(s) Notes Reviewed:      Care Discussed with Consultants/Other Providers:

## 2022-08-25 NOTE — DISCHARGE NOTE PROVIDER - CARE PROVIDER_API CALL
Stevie Padilla)  Surgery; Thoracic Surgery  [address] [place] [address], [place] [address]  Phone: [phone]  Fax: [phone]  Established Patient  Follow Up Time: 2 weeks   Stevie Padilla)  Surgery; Thoracic Surgery  [address] [place] [address], [place] [address]  Phone: [phone]  Fax: [phone]  Established Patient  Follow Up Time: 2 weeks    Niles Walden)  Cardiology; Internal Medicine  [address] [facility], [address]  [place] [address]  Phone: [phone]  Fax: [phone]  Follow Up Time:

## 2022-08-25 NOTE — DISCHARGE NOTE PROVIDER - NSDCCPCAREPLAN_GEN_ALL_CORE_FT
PRINCIPAL DISCHARGE DIAGNOSIS  Diagnosis: Hemoptysis  Assessment and Plan of Treatment:        PRINCIPAL DISCHARGE DIAGNOSIS  Diagnosis: Hemoptysis  Assessment and Plan of Treatment: You were seen in the hospital for coughing up blood. Your INR level was highly elevated due to your Coumadin medication. You were taken off Coumadin until your INR level decreased to normal. Your coughing up blood resolved over your admission. Please discontinue taking Coumadin and Plavix as discussed.      SECONDARY DISCHARGE DIAGNOSES  Diagnosis: CAD (coronary artery disease)  Assessment and Plan of Treatment: Please discontinue Coumadin and Plavix. Continue taking Aspirin daily as discussed. Follow up with your PCP Dr Walden.    Diagnosis: Lung mass  Assessment and Plan of Treatment: You have a lung mass which will require a biopsy. An appointment has been made for you with Interventional Radiology on 9/6 as described in the discharge packet in detail. Please call to confirm appointment date and time and follow all instructions as listed. Additionally, please call to schedule an appointment with the Cardiothoracic team Dr Padilla within 7-10 days as listed in the discharge instructions.    Diagnosis: HLD (hyperlipidemia)  Assessment and Plan of Treatment: Continue prescribed medications to control your cholesterol levels and a DASH (Low fat/salt) diet. Follow up with your primary care provider upon discharge for further management and monitoring of cholesterol levels.

## 2022-08-25 NOTE — DISCHARGE NOTE PROVIDER - HOSPITAL COURSE
This 76 y/o M with PMH of Depression, bipolar disorder, BPH, and STEMI with cardiogenic shock in May 2022 presented to the ED with new onset hemoptysis while on Warfarin, ASA, and Plavix. He also had a known R lung nodule that was suspicious for cancer and for which an IR biopsy was pending completion of a Warfarin course on 8/26/2022.  After discussion between Aileen Padilla and Win (PCP), the pt was discharged home to have the IR bx as an outpatient. This 78 y/o M with PMH of Depression, bipolar disorder, BPH, and STEMI with cardiogenic shock in May 2022 presented to the ED with new onset hemoptysis while on Warfarin, ASA, and Plavix. He also had a known R lung nodule that was suspicious for cancer and for which an IR biopsy was pending completion of a Warfarin course on 8/26/2022.  After discussion between Aileen Padilla and Win (PCP), the pt was discharged home to have the IR bx as an outpatient.     Provider discussed with patient to follow up with Interventional Radiology for lung biopsy as outpatient, Cardiothoracic surgery Dr Padilla and PCP Dr Walden. Patient is to discontinue Coumadin and Plavix. Patient demonstrated understanding via feedback method.     Patient is medically stable for discharge. Discussed with Dr Hammond.

## 2022-08-25 NOTE — DISCHARGE NOTE PROVIDER - NSDCFUADDAPPT_GEN_ALL_CORE_FT
See Dr Padilla in 7 to 10 days.  Call for an appointment.   For lung biopsy, you have been made a scheduled appointment on Tuesday 9/6 at 10am, Please arrive at 9 am. Your procedure will be with Dr Arriaga. The address is [address] [place] [address] floor room [address]. Please do not eat food after midnight before surgery. Please call  to confirm appointment date and time. You will need a COVID test within 5 days of the surgery. If performed at an outside laboratory, please have results faxed to .     Please See Dr Padilla in 7 to 10 days.  Call for an appointment.      Follow up with PCP in 1-2 weeks.

## 2022-08-25 NOTE — DISCHARGE NOTE PROVIDER - NSDCMRMEDTOKEN_GEN_ALL_CORE_FT
clopidogrel 75 mg oral tablet: 1 tab(s) orally once a day  Effexor  mg oral capsule, extended release: 1 cap(s) orally once a day   Flomax 0.4 mg oral capsule: 1 cap(s) orally once a day  Lasix 40 mg oral tablet: 1 tab(s) orally 2 times a day   Lipitor 80 mg oral tablet: 1 tab(s) orally once a day (at bedtime)  METOPROLOL SUCC ER 25 MG TAB: 1 tab(s) orally once a day  mirtazapine 7.5 mg oral tablet: 2 tab(s) orally once a day (at bedtime)   warfarin 4 mg oral tablet: 1 tab(s) orally once a day (at bedtime), except sunday  warfarin 4 mg oral tablet: 0.5 tab(s) orally once a week on Sundays only   aspirin 81 mg oral capsule: 1 cap(s) orally once a day   Effexor  mg oral capsule, extended release: 1 cap(s) orally once a day   Flomax 0.4 mg oral capsule: 1 cap(s) orally once a day  Lasix 40 mg oral tablet: 1 tab(s) orally 2 times a day   Lipitor 80 mg oral tablet: 1 tab(s) orally once a day (at bedtime)  melatonin 3 mg oral tablet: 1 tab(s) orally once a day (at bedtime), As needed, Insomnia  METOPROLOL SUCC ER 25 MG TAB: 1 tab(s) orally once a day  mirtazapine 7.5 mg oral tablet: 2 tab(s) orally once a day (at bedtime)

## 2022-08-25 NOTE — DISCHARGE NOTE PROVIDER - PROVIDER TOKENS
PROVIDER:[TOKEN:[[id number]],FOLLOWUP:[2 weeks],ESTABLISHEDPATIENT:[T]] PROVIDER:[TOKEN:[[id number]],FOLLOWUP:[2 weeks],ESTABLISHEDPATIENT:[T]],PROVIDER:[TOKEN:[[id number]]]

## 2022-08-26 ENCOUNTER — TRANSCRIPTION ENCOUNTER (OUTPATIENT)
Age: 77
End: 2022-08-26

## 2022-08-26 VITALS
TEMPERATURE: 98 F | OXYGEN SATURATION: 98 % | DIASTOLIC BLOOD PRESSURE: 60 MMHG | HEART RATE: 89 BPM | SYSTOLIC BLOOD PRESSURE: 102 MMHG | RESPIRATION RATE: 18 BRPM

## 2022-08-26 LAB
ANION GAP SERPL CALC-SCNC: 11 MMOL/L — SIGNIFICANT CHANGE UP (ref 7–14)
APTT BLD: 32.1 SEC — SIGNIFICANT CHANGE UP (ref 27–36.3)
BUN SERPL-MCNC: 23 MG/DL — SIGNIFICANT CHANGE UP (ref 7–23)
CALCIUM SERPL-MCNC: 9.1 MG/DL — SIGNIFICANT CHANGE UP (ref 8.4–10.5)
CHLORIDE SERPL-SCNC: 104 MMOL/L — SIGNIFICANT CHANGE UP (ref 98–107)
CO2 SERPL-SCNC: 23 MMOL/L — SIGNIFICANT CHANGE UP (ref 22–31)
CREAT SERPL-MCNC: 1 MG/DL — SIGNIFICANT CHANGE UP (ref 0.5–1.3)
EGFR: 78 ML/MIN/1.73M2 — SIGNIFICANT CHANGE UP
GLUCOSE SERPL-MCNC: 98 MG/DL — SIGNIFICANT CHANGE UP (ref 70–99)
HCT VFR BLD CALC: 39.3 % — SIGNIFICANT CHANGE UP (ref 39–50)
HGB BLD-MCNC: 13 G/DL — SIGNIFICANT CHANGE UP (ref 13–17)
INR BLD: 1.45 RATIO — HIGH (ref 0.88–1.16)
MCHC RBC-ENTMCNC: 30.8 PG — SIGNIFICANT CHANGE UP (ref 27–34)
MCHC RBC-ENTMCNC: 33.1 GM/DL — SIGNIFICANT CHANGE UP (ref 32–36)
MCV RBC AUTO: 93.1 FL — SIGNIFICANT CHANGE UP (ref 80–100)
NRBC # BLD: 0 /100 WBCS — SIGNIFICANT CHANGE UP (ref 0–0)
NRBC # FLD: 0 K/UL — SIGNIFICANT CHANGE UP (ref 0–0)
PLATELET # BLD AUTO: 183 K/UL — SIGNIFICANT CHANGE UP (ref 150–400)
POTASSIUM SERPL-MCNC: 3.9 MMOL/L — SIGNIFICANT CHANGE UP (ref 3.5–5.3)
POTASSIUM SERPL-SCNC: 3.9 MMOL/L — SIGNIFICANT CHANGE UP (ref 3.5–5.3)
PROTHROM AB SERPL-ACNC: 16.9 SEC — HIGH (ref 10.5–13.4)
RBC # BLD: 4.22 M/UL — SIGNIFICANT CHANGE UP (ref 4.2–5.8)
RBC # FLD: 15.8 % — HIGH (ref 10.3–14.5)
SODIUM SERPL-SCNC: 138 MMOL/L — SIGNIFICANT CHANGE UP (ref 135–145)
WBC # BLD: 6.47 K/UL — SIGNIFICANT CHANGE UP (ref 3.8–10.5)
WBC # FLD AUTO: 6.47 K/UL — SIGNIFICANT CHANGE UP (ref 3.8–10.5)

## 2022-08-26 PROCEDURE — 99239 HOSP IP/OBS DSCHRG MGMT >30: CPT

## 2022-08-26 RX ORDER — LANOLIN ALCOHOL/MO/W.PET/CERES
1 CREAM (GRAM) TOPICAL
Qty: 0 | Refills: 0 | DISCHARGE
Start: 2022-08-26

## 2022-08-26 RX ORDER — ASPIRIN/CALCIUM CARB/MAGNESIUM 324 MG
1 TABLET ORAL
Qty: 30 | Refills: 0
Start: 2022-08-26 | End: 2022-09-24

## 2022-08-26 RX ORDER — WARFARIN SODIUM 2.5 MG/1
0.5 TABLET ORAL
Qty: 0 | Refills: 0 | DISCHARGE

## 2022-08-26 RX ADMIN — Medication 40 MILLIGRAM(S): at 05:18

## 2022-08-26 NOTE — DISCHARGE NOTE NURSING/CASE MANAGEMENT/SOCIAL WORK - PATIENT PORTAL LINK FT
You can access the FollowMyHealth Patient Portal offered by [facility] by registering at the following website: [email or web address]. By joining [employer]’s FollowMyHealth portal, you will also be able to view your health information using other applications (apps) compatible with our system.

## 2022-08-26 NOTE — DISCHARGE NOTE NURSING/CASE MANAGEMENT/SOCIAL WORK - NSDCVIVACCINE_GEN_ALL_CORE_FT
COVID-19, mRNA, LNP-S, PF, 100 mcg/ 0.5 mL dose (Moderna); 17-May-2022 14:07; Pankaj Velazco (RN); [employer] 711I50D (Exp. Date: 05-Jun-2022); IntraMuscular; Deltoid Left.; 0.25 milliLiter(s);

## 2022-08-26 NOTE — PROGRESS NOTE ADULT - PROBLEM SELECTOR PROBLEM 2
LV (left ventricular) mural thrombus following MI

## 2022-08-26 NOTE — DISCHARGE NOTE NURSING/CASE MANAGEMENT/SOCIAL WORK - NSDCFUADDAPPT_GEN_ALL_CORE_FT
For lung biopsy, you have been made a scheduled appointment on Tuesday 9/6 at 10am, Please arrive at 9 am. Your procedure will be with Dr Arriaga. The address is [address] [place] [address] floor room [address]. Please do not eat food after midnight before surgery. Please call  to confirm appointment date and time. You will need a COVID test within 5 days of the surgery. If performed at an outside laboratory, please have results faxed to .     Please See Dr Padilla in 7 to 10 days.  Call for an appointment.      Follow up with PCP in 1-2 weeks.

## 2022-08-26 NOTE — PROGRESS NOTE ADULT - PROVIDER SPECIALTY LIST ADULT
Cardiology
Cardiology
Thoracic Surgery
Thoracic Surgery
Hospitalist

## 2022-08-26 NOTE — CONSULT NOTE ADULT - ASSESSMENT
Interventional Radiology    Evaluate for Procedure: Lung mass biopsy    HPI:   78 y/o M with pmhx of Depression, bipolar, BPH, hx of STEMI (on ASA, plavix), HFrEF (EF 25%), LV thrombus presented to the ED for new onset hemoptysis in the setting of supra-therapeutic INR. Patient on ASA/Plavix post MI, then found to have a LV thrombus which is 1x1.3 cm in size and was started on warfarin (planned to stop 8/26). The patient also found to have a R lung nodule which is suspicious for cancer. IR consulted for R lung nodule biopsy.     Allergies:   Medications (Abx/Cardiac/Anticoagulation/Blood Products)    furosemide    Tablet: 40 milliGRAM(s) Oral (08-26 @ 05:18)  tamsulosin: 0.4 milliGRAM(s) Oral (08-25 @ 22:01)    Data:    82.3  T(C): 36.5  HR: 84  BP: 102/57  RR: 18  SpO2: 99%    -WBC 6.47 / HgB 13.0 / Hct 39.3 / Plt 183  -Na 138 / Cl 104 / BUN 23 / Glucose 98  -K 3.9 / CO2 23 / Cr 1.00  -ALT -- / Alk Phos -- / T.Bili --  -INR 1.45 / PTT 32.1      Radiology:   PET/CT 7/28: 3.5 FDG avid partly solid right upper lung nodule suspicious for malignancy.    Assessment/Plan:   78 y/o M with pmhx of Depression, bipolar, BPH, hx of STEMI (on ASA, plavix), HFrEF (EF 25%), LV thrombus presented to the ED for new onset hemoptysis in the setting of supra-therapeutic INR. Patient on ASA/Plavix post MI, then found to have a LV thrombus which is 1x1.3 cm in size and was started on warfarin (planned to stop 8/26). The patient also found to have a R lung nodule which is suspicious for cancer. IR consulted for R lung nodule biopsy.     -- Care reviewed with attending Dr. Velez  -- Recommend lung biopsy in the outpatient setting once hemoptysis resolves  -- Please schedule outpatient appointment with IR at [phone]

## 2022-08-26 NOTE — PROGRESS NOTE ADULT - PROBLEM SELECTOR PLAN 3
INR 4.4->4.0->2.4 -> 1.8  prior home dose is 4mg daily   coumadin discontinued as above
INR 4.4->4.0->2.4   prior home dose is 4mg daily   redose coumadin tonight at 3mg
INR 4.4->4.0  continue to hold coumadin
INR 4.4->4.0->2.4 -> 1.8  prior home dose is 4mg daily   coumadin discontinued as above

## 2022-08-26 NOTE — PROGRESS NOTE ADULT - PROBLEM SELECTOR PLAN 9
DVTppx: SCDs  Dispo: home pending stable INR
DVTppx: SCDs  Dispo: home today, d/c planning 40 min coordinating care

## 2022-08-26 NOTE — PROGRESS NOTE ADULT - PROBLEM SELECTOR PROBLEM 3
Warfarin-induced coagulopathy

## 2022-08-26 NOTE — PROGRESS NOTE ADULT - PROBLEM SELECTOR PLAN 5
- c/w effexor
- hx of STEMI in may, resulting EF 30% on echo  - euvolemic with no SOB symptoms, no LE swelling or pulmonary edema on exam  - c/w lasix, metoprolol for now with hold parameters  - appreciate Cardiology recs
- hx of STEMI in may, resulting EF 30% on echo  - euvolemic with no SOB symptoms, no LE swelling or pulmonary edema on exam  - c/w lasix, metoprolol for now with hold parameters  - appreciate Cardiology recs
- c/w effexor

## 2022-08-26 NOTE — PROGRESS NOTE ADULT - PROBLEM SELECTOR PLAN 4
s/p STEMI on 5/2022  [facility] showed 100% occlusion for right coronary artery and 70% occlusion of left anterior descending artery.  The arteries were not intervened upon due to viability study showing no viability in the areas supplied by the occluded arteries  - coumadin has been discontinued as above   - discussed with patient's cardiologist Dr. Walden no need for DAPT given that he is now 3 months from incident, will d/c Plavix and start on ASA   - [facility] cardiology recs
- hx of STEMI in may, resulting EF 30% on echo  - euvolemic with no SOB symptoms, no LE swelling or pulmonary edema on exam  - c/w lasix, metoprolol for now with hold parameters
s/p STEMI on 5/2022  [facility] showed 100% occlusion for right coronary artery and 70% occlusion of left anterior descending artery.  The arteries were not intervened upon due to viability study showing no viability in the areas supplied by the occluded arteries  - coumadin has been discontinued   - Plavix has been on hold for hemoptysis, possible procedure  - appreciate Cardiology recs
- hx of STEMI in may, resulting EF 30% on echo  - euvolemic with no SOB symptoms, no LE swelling or pulmonary edema on exam  - c/w lasix, metoprolol for now with hold parameters

## 2022-08-26 NOTE — PROGRESS NOTE ADULT - PROBLEM SELECTOR PROBLEM 4
CAD (coronary artery disease)
Chronic systolic heart failure
Chronic systolic heart failure
CAD (coronary artery disease)

## 2022-08-26 NOTE — PROGRESS NOTE ADULT - PROBLEM SELECTOR PLAN 1
- now resolved, reported only minimal bleeding at home,   - found to have elevated INR of 4 in setting on lung mass which is being worked up as outpatient  - possible source of bleed is from lung mass in the setting of supratherapeutic INR  - monitor for bleeding closely  - hold warfarin for now, trend INR closely  - no need for Vitamin K as the patient is no longer having active bleeding

## 2022-08-26 NOTE — PROGRESS NOTE ADULT - SUBJECTIVE AND OBJECTIVE BOX
Patient is a 77y old  Male who presents with a chief complaint of hemoptysis (26 Aug 2022 09:39)    Jeronimo Hammond MD   [facility]   Pager [phone]  Reachable on [employer] Teams     SUBJECTIVE / OVERNIGHT EVENTS:  Patient seen and examined this morning. Tele reviewed, multiple episode of PVCs, no afib.   No further episodes of hemoptysis.    MEDICATIONS  (STANDING):  atorvastatin 80 milliGRAM(s) Oral at bedtime  furosemide    Tablet 40 milliGRAM(s) Oral two times a day  metoprolol succinate ER 25 milliGRAM(s) Oral daily  mirtazapine 15 milliGRAM(s) Oral at bedtime  tamsulosin 0.4 milliGRAM(s) Oral at bedtime  venlafaxine XR. 150 milliGRAM(s) Oral daily    MEDICATIONS  (PRN):  acetaminophen     Tablet .. 650 milliGRAM(s) Oral every 6 hours PRN Temp greater or equal to 38C (100.4F), Mild Pain (1 - 3)  melatonin 3 milliGRAM(s) Oral at bedtime PRN Insomnia      Vital Signs Last 24 Hrs  T(C): 36.5 (26 Aug 2022 12:00), Max: 36.5 (26 Aug 2022 00:19)  T(F): 97.7 (26 Aug 2022 12:00), Max: 97.7 (26 Aug 2022 00:19)  HR: 89 (26 Aug 2022 12:00) (76 - 89)  BP: 102/60 (26 Aug 2022 12:00) (102/52 - 106/64)  BP(mean): --  RR: 18 (26 Aug 2022 12:00) (18 - 18)  SpO2: 98% (26 Aug 2022 12:00) (97% - 99%)  CAPILLARY BLOOD GLUCOSE        I&O's Summary    25 Aug 2022 07:01  -  26 Aug 2022 07:00  --------------------------------------------------------  IN: 400 mL / OUT: 2350 mL / NET: -1950 mL        General: elderly man sitting up in on side of bed, appears well in NAD, awake and alert  Respiratory: No respiratory distress, CTABL  Cardiovascular: S1,S2; Regular rate and rhythm; No m/g/r.   Gastrointestinal: Soft, Nontender, Nondistended; +BS.   Extremities: No c/c/e; warm to touch  Skin: small purpura on extremities, No rashes, No erythema   Psych: appropriate mood and affect    LABS:                        13.0   6.47  )-----------( 183      ( 26 Aug 2022 05:00 )             39.3     08-26    138  |  104  |  23  ----------------------------<  98  3.9   |  23  |  1.00    Ca    9.1      26 Aug 2022 05:00      PT/INR - ( 26 Aug 2022 05:00 )   PT: 16.9 sec;   INR: 1.45 ratio         PTT - ( 26 Aug 2022 05:00 )  PTT:32.1 sec          RADIOLOGY & ADDITIONAL TESTS:    Imaging Personally Reviewed:    Consultant(s) Notes Reviewed:      Care Discussed with Consultants/Other Providers:

## 2022-08-26 NOTE — PROGRESS NOTE ADULT - PROBLEM SELECTOR PLAN 8
- patient has a R sided upper lung mass known and being worked up outpatient  - nuclear test showing avid signals from mass, concerning for malignancy  - plan for lung mass work up to be completed inpatient vs outpatient per thoracic
- patient has a R sided upper lung mass known and being worked up outpatient  - nuclear test showing avid signals from mass, concerning for malignancy  - plan for lung mass work up to be completed inpatient vs outpatient per thoracic
DVTppx: on coumadin with theraputic INR  Dispo: home pending stable INR
DVTppx: on coumadin with theraputic INR  Dispo: home pending stable INR

## 2022-08-26 NOTE — PROGRESS NOTE ADULT - PROBLEM SELECTOR PLAN 7
- c/w statins
- c/w statins
- patient has a R sided upper lung mass known and being worked up outpatient  - nuclear test showing avid signals from mass, concerning for malignancy  - plan for lung mass work up to be completed as outpatient per thoracic
- patient has a R sided upper lung mass known and being worked up outpatient  - nuclear test showing avid signals from mass, concerning for malignancy  - plan for lung mass work up to be completed as outpatient

## 2022-08-26 NOTE — PROGRESS NOTE ADULT - PROBLEM SELECTOR PLAN 2
- patient found to have an LV thrombus on echo after his MI in 5/2022  - Echo 5/2022 - Left ventricular thrombus measuring 1.0 by 1.3 cm seen in LV apex.  - has been on warfarin and plavix, suppose to stop warfarin on 8/26/2022 per family   - repeat echo here showing resolution of thrombus  - cardiology consult appreciated   - d/c warfarin given that he is s/p 3 months of treatment with resolution of clot
- patient found to have an LV thrombus on echo after his MI in 5/2022  - Echo 5/2022 - Left ventricular thrombus measuring 1.0 by 1.3 cm seen in LV apex.  - has been on warfarin and plavix, suppose to stop warfarin on 8/26/2022 per family   [ ] repeat echo to see if LV thrombus is still present, pending read   - cardiology consult  - resume warfarin at lower dose
- patient found to have an LV thrombus on echo after his MI in 5/2022  - Echo 5/2022 - Left ventricular thrombus measuring 1.0 by 1.3 cm seen in LV apex.  - has been on warfarin and plavix, suppose to stop warfarin on 8/26/2022 per family   [ ] repeat echo to see if LV thrombus is still present  [ ] cardiology consult  - hold warfarin for now in the setting of bleeding as above
- patient found to have an LV thrombus on echo after his MI in 5/2022  - Echo 5/2022 - Left ventricular thrombus measuring 1.0 by 1.3 cm seen in LV apex.  - has been on warfarin and plavix, suppose to stop warfarin on 8/26/2022 per family   - repeat echo here showing resolution of thrombus  - cardiology consult appreciated   - d/c warfarin given that he is s/p 3 months of treatment with resolution of clot

## 2022-08-29 ENCOUNTER — RESULT REVIEW (OUTPATIENT)
Age: 77
End: 2022-08-29

## 2022-08-29 ENCOUNTER — APPOINTMENT (OUTPATIENT)
Dept: RADIOLOGY | Facility: HOSPITAL | Age: 77
End: 2022-08-29

## 2022-08-29 ENCOUNTER — APPOINTMENT (OUTPATIENT)
Dept: THORACIC SURGERY | Facility: CLINIC | Age: 77
End: 2022-08-29

## 2022-08-29 ENCOUNTER — OUTPATIENT (OUTPATIENT)
Dept: OUTPATIENT SERVICES | Facility: HOSPITAL | Age: 77
LOS: 1 days | End: 2022-08-29

## 2022-08-29 VITALS
OXYGEN SATURATION: 98 % | HEART RATE: 75 BPM | BODY MASS INDEX: 24.38 KG/M2 | HEIGHT: 72 IN | WEIGHT: 180 LBS | RESPIRATION RATE: 17 BRPM | SYSTOLIC BLOOD PRESSURE: 90 MMHG | DIASTOLIC BLOOD PRESSURE: 54 MMHG

## 2022-08-29 DIAGNOSIS — R91.1 SOLITARY PULMONARY NODULE: ICD-10-CM

## 2022-08-29 DIAGNOSIS — Z98.890 OTHER SPECIFIED POSTPROCEDURAL STATES: Chronic | ICD-10-CM

## 2022-08-29 PROCEDURE — 71046 X-RAY EXAM CHEST 2 VIEWS: CPT | Mod: 26

## 2022-08-29 PROCEDURE — 99214 OFFICE O/P EST MOD 30 MIN: CPT

## 2022-08-29 RX ORDER — WARFARIN 4 MG/1
4 TABLET ORAL
Qty: 90 | Refills: 1 | Status: COMPLETED | COMMUNITY
Start: 2022-06-06 | End: 2022-08-29

## 2022-08-29 RX ORDER — CLOPIDOGREL BISULFATE 75 MG/1
75 TABLET, FILM COATED ORAL
Qty: 90 | Refills: 0 | Status: COMPLETED | COMMUNITY
Start: 2022-06-06 | End: 2022-08-29

## 2022-08-30 DIAGNOSIS — Z01.818 ENCOUNTER FOR OTHER PREPROCEDURAL EXAMINATION: ICD-10-CM

## 2022-08-31 NOTE — ASSESSMENT
[FreeTextEntry1] : Mr. ELYSE MALAVE, 77 year old male, never smoker, w/ hx of bipolar, BPH, SCC of leg, CHF, CAD, hospitalized on May 11-17/2022 for acute MI s/p cardiac Cath with no intervention (LHC showed 100% occlusion for right coronary artery and 70% occlusion of left anterior descending artery. The arteries were not intervened upon due to viability study showing no viability in the areas supplied by the occluded arteries), cardiogenic shock, found to have new 1x 1.3cm LV thrombus on Plavix and Coumadin (Coumadin will be completed on 08/26/2022), who f/u with cardiologist and c/o SOB, CXR showed RUL nodule, PET/CT showed FDG avid RUL mass, referred by Dr. Nicholas Espinal. \par \par I have reviewed the patient's medical records and diagnostic images at time of this office consultation and have made the following recommendation:\par 1. Patient is doing well, no more episodes of hemoptysis after discharge,  I recommended a Right VATS, RULobectomy on 09/22/2022. Risks and benefits and alternatives explained to patient, all questions answered, patient agreed to proceed with surgery.\par 2. Cardiac clearance and PST. \par \par I personally performed the services described in the documentation, reviewed the documentation recorded by the scribe in my presence and it accurately and completely records my words and actions.\par \par LILLIANA, Love Lopez NP, am scribing for and the presence of ERNESTINA Mandujano, the following sections HISTORY OF PRESENT ILLNESS, PAST MEDICAL/FAMILY/SOCIAL HISTORY; REVIEW OF SYSTEMS; VITAL SIGNS; PHYSICAL EXAM; DISPOSITION.

## 2022-08-31 NOTE — CONSULT LETTER
[Dear  ___] : Dear  [unfilled], [Consult Letter:] : I had the pleasure of evaluating your patient, [unfilled]. [( Thank you for referring [unfilled] for consultation for _____ )] : Thank you for referring [unfilled] for consultation for [unfilled] [Please see my note below.] : Please see my note below. [Consult Closing:] : Thank you very much for allowing me to participate in the care of this patient.  If you have any questions, please do not hesitate to contact me. [Sincerely,] : Sincerely, [FreeTextEntry2] : Dr. Nicholas Espinal (Pulm/Ref) [FreeTextEntry3] : Stevie Padilla MD \par Attending Surgeon \par Division of Thoracic Surgery \par , Cardiovascular and Thoracic Surgery \par [facility] [place]\par \par

## 2022-08-31 NOTE — CONSULT LETTER
[Dear  ___] : Dear  [unfilled], [Consult Letter:] : I had the pleasure of evaluating your patient, [unfilled]. [( Thank you for referring [unfilled] for consultation for _____ )] : Thank you for referring [unfilled] for consultation for [unfilled] [Please see my note below.] : Please see my note below. [Consult Closing:] : Thank you very much for allowing me to participate in the care of this patient.  If you have any questions, please do not hesitate to contact me. [Sincerely,] : Sincerely, [FreeTextEntry2] : Dr. Nicholas Espinal (Pulm/Ref) [FreeTextEntry3] : Stevie Padilla MD \par Attending Surgeon \par Division of Thoracic Surgery \par , Cardiovascular and Thoracic Surgery \par [facility] at [facility]\par \par

## 2022-08-31 NOTE — ASSESSMENT
[FreeTextEntry1] : Mr. ELYSE MALAVE, 77 year old male, never smoker, w/ hx of bipolar, BPH, SCC of leg, CHF, CAD, hospitalized on May 11-17/2022 for acute MI s/p cardiac Cath with no intervention (LHC showed 100% occlusion for right coronary artery and 70% occlusion of left anterior descending artery. The arteries were not intervened upon due to viability study showing no viability in the areas supplied by the occluded arteries), cardiogenic shock, found to have new 1x 1.3cm LV thrombus on Plavix and Coumadin (Coumadin will be completed on 08/26/2022), who f/u with cardiologist and c/o SOB, CXR showed RUL nodule, PET/CT showed FDG avid RUL mass, referred by Dr. Nicholas Espinal. \par \par I have reviewed the patient's medical records and diagnostic images at time of this office consultation and have made the following recommendation:\par 1. Case was discussed with Dr. Espinal over the phone, recommended to admit patient to ED for further evaluation. \par \par I personally performed the services described in the documentation, reviewed the documentation recorded by the scribe in my presence and it accurately and completely records my words and actions.\par \par I, Love Lopez NP, am scribing for and the presence of ERNESTINA Mandujano, the following sections HISTORY OF PRESENT ILLNESS, PAST MEDICAL/FAMILY/SOCIAL HISTORY; REVIEW OF SYSTEMS; VITAL SIGNS; PHYSICAL EXAM; DISPOSITION. \par

## 2022-08-31 NOTE — HISTORY OF PRESENT ILLNESS
[FreeTextEntry1] : Mr. ELYSE MALAVE, 77 year old male, never smoker, w/ hx of bipolar, BPH, SCC of leg, CHF, CAD, hospitalized on May 11-17/2022 for acute MI s/p cardiac Cath with no intervention (LHC showed 100% occlusion for right coronary artery and 70% occlusion of left anterior descending artery. The arteries were not intervened upon due to viability study showing no viability in the areas supplied by the occluded arteries), cardiogenic shock, found to have new 1x 1.3cm LV thrombus on Plavix and Coumadin (Coumadin will be completed on 08/26/2022), who f/u with cardiologist and c/o SOB, CXR showed RUL nodule, PET/CT showed FDG avid RUL mass, referred by Dr. Nicholas Espinal. \par \par CXR on 07/05/2022: for SOB\par -  Ill-defined nodular opacity seen in the right upper lobe for which further evaluation with CT scan imaging chest is recommended.\par \par CT chest on 07/06/2022: \par - A 3.5 x 2 cm part solid mass containing 2.8 cm solid component is noted in the right upper lobe. \par - 0.3 cm ill-defined nodule is noted in the left upper lobe.\par - Few linear opacities representing atelectasis are noted in both lungs. \par - Small bilateral pleural effusions are noted.\par \par PET/CT on 07/28/2022:\par - Mildly FDG avid mixed solid and groundglass right upper lung mass measuring 3.5 x 2 cm with SUV 4.8 (image 82). A 0.3 cm left upper lung nodule (image 75) is too small to characterize.\par - Small bilateral pleural effusion not significantly changed from comparison CT with minimal FDG avidity with SUV 2.2. \par - Large photopenic 8.4 x 7.7 cm low-attenuation mass along the inferoposterior aspect of the right kidney, compatible with cyst.\par - Nonspecific mildly FDG avid low-attenuation left adrenal nodule, possibly an adenoma. This may be further evaluated with contrast-enhanced CT or MRI.\par - Heterogeneous FDG activity in the soft tissue associated with surgical clips overlying the symphysis pubis, probably related from prior procedure. \par \par PFTs on 08/03/2022: FEV1 2.41 (70%), DLCO 62%. \par \par Patient was seen on 08/08/2022, RUL mass is suspicious for primary lung cancer, however, given patient recent MI, high risk for lung surgery, I recommended a CT guided bx for definitive diagnosis before surgical intervention. Patient was evaluated by IR on 08/11/2022, recommended cardiac clearance prior to procedure. Patient is also currently on Plavix and Coumadin (Coumadin will be completed on 08/26/2022). \par \par MRI of Abdomen on 08/10/2022:\par - 2 cm left adrenal nodule with imaging features consistent with an adenoma\par - Right renal cyst measuring 8 cm\par \par Patient was seen on 08/22/2022 and sent to ED for hemoptysis. AC was stopped, and patient is currently on ASA 81 mg. Patient has cardiac clearance appointment on 09/08/2022, and CT guided bx on 09/14/2022. \par \par Patient is here today for a follow up.

## 2022-08-31 NOTE — HISTORY OF PRESENT ILLNESS
[FreeTextEntry1] : Mr. ELYSE MALAVE, 77 year old male, never smoker, w/ hx of bipolar, BPH, SCC of leg, CHF, CAD, hospitalized on May 11-17/2022 for acute MI s/p cardiac Cath with no intervention (LHC showed 100% occlusion for right coronary artery and 70% occlusion of left anterior descending artery. The arteries were not intervened upon due to viability study showing no viability in the areas supplied by the occluded arteries), cardiogenic shock, found to have new 1x 1.3cm LV thrombus on Plavix and Coumadin (Coumadin will be completed on 08/26/2022), who f/u with cardiologist and c/o SOB, CXR showed RUL nodule, PET/CT showed FDG avid RUL mass, referred by Dr. Nicholas Espinal. \par \par CXR on 07/05/2022: for SOB\par -  Ill-defined nodular opacity seen in the right upper lobe for which further evaluation with CT scan imaging chest is recommended.\par \par CT chest on 07/06/2022: \par - A 3.5 x 2 cm part solid mass containing 2.8 cm solid component is noted in the right upper lobe. \par - 0.3 cm ill-defined nodule is noted in the left upper lobe.\par - Few linear opacities representing atelectasis are noted in both lungs. \par - Small bilateral pleural effusions are noted.\par \par PET/CT on 07/28/2022:\par - Mildly FDG avid mixed solid and groundglass right upper lung mass measuring 3.5 x 2 cm with SUV 4.8 (image 82). A 0.3 cm left upper lung nodule (image 75) is too small to characterize.\par - Small bilateral pleural effusion not significantly changed from comparison CT with minimal FDG avidity with SUV 2.2. \par - Large photopenic 8.4 x 7.7 cm low-attenuation mass along the inferoposterior aspect of the right kidney, compatible with cyst.\par - Nonspecific mildly FDG avid low-attenuation left adrenal nodule, possibly an adenoma. This may be further evaluated with contrast-enhanced CT or MRI.\par - Heterogeneous FDG activity in the soft tissue associated with surgical clips overlying the symphysis pubis, probably related from prior procedure. \par \par PFTs on 08/03/2022: FEV1 2.41 (70%), DLCO 62%. \par \par Patient was seen on 08/08/2022, RUL mass is suspicious for primary lung cancer, however, given patient recent MI, high risk for lung surgery, I recommended a CT guided bx for definitive diagnosis before surgical intervention. Patient was evaluated by IR on 08/11/2022, recommended cardiac clearance prior to procedure. Patient is also currently on Plavix and Coumadin (Coumadin will be completed on 08/26/2022). \par \par MRI of Abdomen on 08/10/2022:\par - 2 cm left adrenal nodule with imaging features consistent with an adenoma\par - Right renal cyst measuring 8 cm\par \par Patient is here today for a follow up. Patient had an episode of coughing bright red sputum yesterday, today had two episodes. c/o increased SOB.

## 2022-09-02 ENCOUNTER — NON-APPOINTMENT (OUTPATIENT)
Age: 77
End: 2022-09-02

## 2022-09-06 ENCOUNTER — OUTPATIENT (OUTPATIENT)
Dept: OUTPATIENT SERVICES | Facility: HOSPITAL | Age: 77
LOS: 1 days | End: 2022-09-06

## 2022-09-06 VITALS
RESPIRATION RATE: 16 BRPM | DIASTOLIC BLOOD PRESSURE: 76 MMHG | HEIGHT: 71.5 IN | SYSTOLIC BLOOD PRESSURE: 112 MMHG | WEIGHT: 177.91 LBS | HEART RATE: 54 BPM | OXYGEN SATURATION: 98 % | TEMPERATURE: 97 F

## 2022-09-06 DIAGNOSIS — R91.8 OTHER NONSPECIFIC ABNORMAL FINDING OF LUNG FIELD: ICD-10-CM

## 2022-09-06 DIAGNOSIS — Z91.89 OTHER SPECIFIED PERSONAL RISK FACTORS, NOT ELSEWHERE CLASSIFIED: ICD-10-CM

## 2022-09-06 DIAGNOSIS — Z98.890 OTHER SPECIFIED POSTPROCEDURAL STATES: Chronic | ICD-10-CM

## 2022-09-06 DIAGNOSIS — I25.10 ATHEROSCLEROTIC HEART DISEASE OF NATIVE CORONARY ARTERY WITHOUT ANGINA PECTORIS: ICD-10-CM

## 2022-09-06 DIAGNOSIS — I10 ESSENTIAL (PRIMARY) HYPERTENSION: ICD-10-CM

## 2022-09-06 DIAGNOSIS — Z86.79 PERSONAL HISTORY OF OTHER DISEASES OF THE CIRCULATORY SYSTEM: ICD-10-CM

## 2022-09-06 LAB
ALBUMIN SERPL ELPH-MCNC: 4.2 G/DL — SIGNIFICANT CHANGE UP (ref 3.3–5)
ALP SERPL-CCNC: 160 U/L — HIGH (ref 40–120)
ALT FLD-CCNC: 82 U/L — HIGH (ref 4–41)
ANION GAP SERPL CALC-SCNC: 12 MMOL/L — SIGNIFICANT CHANGE UP (ref 7–14)
APTT BLD: 32.5 SEC — SIGNIFICANT CHANGE UP (ref 27–36.3)
AST SERPL-CCNC: 69 U/L — HIGH (ref 4–40)
BILIRUB SERPL-MCNC: 1 MG/DL — SIGNIFICANT CHANGE UP (ref 0.2–1.2)
BLD GP AB SCN SERPL QL: NEGATIVE — SIGNIFICANT CHANGE UP
BUN SERPL-MCNC: 31 MG/DL — HIGH (ref 7–23)
CALCIUM SERPL-MCNC: 9.5 MG/DL — SIGNIFICANT CHANGE UP (ref 8.4–10.5)
CHLORIDE SERPL-SCNC: 101 MMOL/L — SIGNIFICANT CHANGE UP (ref 98–107)
CO2 SERPL-SCNC: 26 MMOL/L — SIGNIFICANT CHANGE UP (ref 22–31)
CREAT SERPL-MCNC: 1.22 MG/DL — SIGNIFICANT CHANGE UP (ref 0.5–1.3)
EGFR: 61 ML/MIN/1.73M2 — SIGNIFICANT CHANGE UP
GLUCOSE SERPL-MCNC: 77 MG/DL — SIGNIFICANT CHANGE UP (ref 70–99)
HCT VFR BLD CALC: 42.2 % — SIGNIFICANT CHANGE UP (ref 39–50)
HGB BLD-MCNC: 13.7 G/DL — SIGNIFICANT CHANGE UP (ref 13–17)
INR BLD: 1.12 RATIO — SIGNIFICANT CHANGE UP (ref 0.88–1.16)
MCHC RBC-ENTMCNC: 31 PG — SIGNIFICANT CHANGE UP (ref 27–34)
MCHC RBC-ENTMCNC: 32.5 GM/DL — SIGNIFICANT CHANGE UP (ref 32–36)
MCV RBC AUTO: 95.5 FL — SIGNIFICANT CHANGE UP (ref 80–100)
NRBC # BLD: 0 /100 WBCS — SIGNIFICANT CHANGE UP (ref 0–0)
NRBC # FLD: 0 K/UL — SIGNIFICANT CHANGE UP (ref 0–0)
PLATELET # BLD AUTO: 209 K/UL — SIGNIFICANT CHANGE UP (ref 150–400)
POTASSIUM SERPL-MCNC: 4 MMOL/L — SIGNIFICANT CHANGE UP (ref 3.5–5.3)
POTASSIUM SERPL-SCNC: 4 MMOL/L — SIGNIFICANT CHANGE UP (ref 3.5–5.3)
PROT SERPL-MCNC: 7.2 G/DL — SIGNIFICANT CHANGE UP (ref 6–8.3)
PROTHROM AB SERPL-ACNC: 13 SEC — SIGNIFICANT CHANGE UP (ref 10.5–13.4)
RBC # BLD: 4.42 M/UL — SIGNIFICANT CHANGE UP (ref 4.2–5.8)
RBC # FLD: 15.9 % — HIGH (ref 10.3–14.5)
RH IG SCN BLD-IMP: POSITIVE — SIGNIFICANT CHANGE UP
SODIUM SERPL-SCNC: 139 MMOL/L — SIGNIFICANT CHANGE UP (ref 135–145)
WBC # BLD: 6.39 K/UL — SIGNIFICANT CHANGE UP (ref 3.8–10.5)
WBC # FLD AUTO: 6.39 K/UL — SIGNIFICANT CHANGE UP (ref 3.8–10.5)

## 2022-09-06 RX ORDER — SODIUM CHLORIDE 9 MG/ML
3 INJECTION INTRAMUSCULAR; INTRAVENOUS; SUBCUTANEOUS EVERY 8 HOURS
Refills: 0 | Status: DISCONTINUED | OUTPATIENT
Start: 2022-09-23 | End: 2022-09-24

## 2022-09-06 NOTE — H&P PST ADULT - RESPIRATORY
clear to auscultation bilaterally/no wheezes/no rales/no rhonchi no wheezes/no rales/no rhonchi/no respiratory distress/breath sounds equal

## 2022-09-06 NOTE — H&P PST ADULT - NSICDXFAMILYHX_GEN_ALL_CORE_FT
FAMILY HISTORY:  Mother  Still living? No  Family history of depression, Age at diagnosis: Age Unknown    Sibling  Still living? Yes, Estimated age: Age Unknown  FH: CAD (coronary artery disease), Age at diagnosis: Age Unknown  Family history of diabetes mellitus (DM), Age at diagnosis: Age Unknown  FH: CAD (coronary artery disease), Age at diagnosis: Age Unknown

## 2022-09-06 NOTE — H&P PST ADULT - PROBLEM SELECTOR PLAN 4
will obtain preop aspirin plan, patient has been instructed by cardio to stop warfarin and plavix 2 weeks ago

## 2022-09-06 NOTE — H&P PST ADULT - PROBLEM SELECTOR PLAN 1
Assessment and Plan: Patient scheduled for surgery on 9/14/22 & 9/22/22  Patient provided with verbal and written presurgical instructions; verbalized understanding  with teach back.    Patient provided with famotidine for GI prophylaxis; verbalized understanding.    Patient provided with Chlorhexidine wash, verbal and written instructions reviewed. Patient demonstrated understanding with teach back   Patient instructed to obtain preop covid pcr, lab directory provided (instructed will be required for each procedure     Cardiac evaluation requested by PST for aspirin preop plan hx of CAD and CHF and lasix instruction for DOS , patient verbalized understanding, will obtain  EKG, Echo and cath in chart Assessment and Plan: Patient scheduled for surgery on 9/14/22 & 9/22/22  Patient provided with verbal and written presurgical instructions; verbalized understanding  with teach back.    Patient provided with famotidine for GI prophylaxis; verbalized understanding.    Patient provided with Chlorhexidine wash, verbal and written instructions reviewed. Patient demonstrated understanding with teach back   Patient instructed to obtain preop covid pcr, lab directory provided (instructed will be required for each procedure     Cardiac evaluation requested by PST for aspirin preop plan; hx of CAD and CHF and lasix instruction for DOS , patient verbalized understanding, will obtain  EKG, Echo and cath in chart

## 2022-09-06 NOTE — H&P PST ADULT - NSICDXPASTSURGICALHX_GEN_ALL_CORE_FT
PAST SURGICAL HISTORY:  H/O coronary angiogram     History of arthroscopy of knee Right, 1987    History of hernia repair     History of tonsillectomy 1952

## 2022-09-06 NOTE — H&P PST ADULT - PATIENT ON (OXYGEN DELIVERY METHOD)
See [facility] [place] report. Remote monitoring reviewed over a 90 day period. End of 90 day monitoring period date of service 2.23.2021.
room air

## 2022-09-06 NOTE — H&P PST ADULT - ATTENDING COMMENTS
Patient seen and examined agree with above note as modified, where appropriate, by me. The risks, benefits and alternatives of the procedure were explained to the patient including but not limited to the risks of bleeding, infection, prolonged air leak, shortness of breath, oxygen dependance, chronic pain, arrythmia, lymphatic leak and nerve injury. All of the patients questions were answered, he demonstrated understanding and freely consented to the procedure.

## 2022-09-06 NOTE — H&P PST ADULT - NSICDXPASTMEDICALHX_GEN_ALL_CORE_FT
PAST MEDICAL HISTORY:  Anxiety     Bipolar disorder     BPH (benign prostatic hyperplasia)     CAD (coronary artery disease)     Constipation     Depression     Depression     History of CHF (congestive heart failure)     History of inguinal hernia     Inguinal hernia of right side without obstruction or gangrene     Lung mass     LV (left ventricular) mural thrombus     Other nonspecific abnormal finding of lung field     SCC (squamous cell carcinoma)     Severe left ventricular systolic dysfunction     Umbilical hernia, incarcerated     Urinary retention

## 2022-09-06 NOTE — H&P PST ADULT - LAST CARDIAC ANGIOGRAM/IMAGING
5/2022 100% occlusion for right coronary artery and 70% occlusion of left anterior descending artery. The arteries were not intervened upon due to viability study showing no viability in the areas supplied by the occluded arteries 5/2022 100% occlusion for right coronary artery and 70% occlusion of left anterior descending artery. "The arteries were not intervened upon due to viability study showing no viability in the areas supplied by the occluded arteries"

## 2022-09-06 NOTE — H&P PST ADULT - VENOUS THROMBOEMBOLISM CURRENT STATUS
(1) congestive heart failure (within 1 month)/(1) other risk factor (includes escalating BMI, pack-years of smoking, diabetes requiring insulin, chemotherapy, female gender and length of surgery)/(2) malignancy (present or previous)

## 2022-09-06 NOTE — H&P PST ADULT - PROBLEM SELECTOR PLAN 2
155 echo in chart- severe LV dysfunction EF 25%   preop lasix plan for DOS    **fluid restriction of 42 ounces per patient echo in chart- severe LV dysfunction EF 25%   preop plan requested for lasix plan for DOS    **fluid restriction of 42 ounces per patient

## 2022-09-06 NOTE — H&P PST ADULT - HISTORY OF PRESENT ILLNESS
77 yr old male presents with hx of bipolar, BPH, SCC of leg, CHF EF 25% severe segmental LV systolic dysfunction, CAD, hospitalized on May 11-17/2022 for acute MI s/p cardiac Cath with no intervention (LHC showed 100% occlusion for right coronary artery and 70% occlusion of left anterior descending artery- no intervention; cardiogenic shock, found to have new 1x 1.3cm LV thrombus on Plavix and Coumadin (Coumadin completed on 08/26/2022), c/o one episode of difficulty walking, chest discomfort  and SOB while in the pool, CXR showed RUL nodule, PET/CT showed FDG avid RUL mass    PET/CT on 07/28/2022:  - Mildly FDG avid mixed solid and groundglass right upper lung mass measuring 3.5 x 2 cm with SUV 4.8 (image 82). A 0.3 cm left upper lung nodule (image 75) is too small to characterize.  - Small bilateral pleural effusion not significantly changed from comparison CT with minimal FDG avidity with SUV 2.2.   - Large photopenic 8.4 x 7.7 cm low-attenuation mass along the inferoposterior aspect of the right kidney, compatible with cyst.  - Nonspecific mildly FDG avid low-attenuation left adrenal nodule, possibly an adenoma. This may be further evaluated with contrast-enhanced CT or MRI.    Patient reports stopping coumadin 2 weeks ago, echo 8/25/22 in chart    Scheduled for RUL mass biopsy on 9/14/22 and followed by right VATs, right upper lobectomy 9/22/22  Patient denies any claudication, chest pain or pressure, SOB, walked for 30 minutes yesterday without s/s 77 yr old male presents with hx of bipolar, BPH, SCC of leg, CHF EF 25% severe segmental LV systolic dysfunction, CAD, hospitalized on May 2022 for acute MI s/p cardiac Cath with no intervention (revealed  RCA and 70% occlusion prox LAD, 1st diagonal 70% stenosis- medical management-no intervention, IABP used for elevated right sided pressures; cardiogenic shock, found to have new 1x 1.3cm LV thrombus on Plavix and Coumadin (Coumadin completed on 08/26/2022, Plavix stopped around same time per pt), c/o one episode of difficulty walking, chest discomfort  and SOB while in the pool, CXR showed RUL nodule, PET/CT showed FDG avid RUL mass    PET/CT on 07/28/2022:  - Mildly FDG avid mixed solid and groundglass right upper lung mass measuring 3.5 x 2 cm with SUV 4.8 (image 82). A 0.3 cm left upper lung nodule (image 75) is too small to characterize.  - Small bilateral pleural effusion not significantly changed from comparison CT with minimal FDG avidity with SUV 2.2.   - Large photopenic 8.4 x 7.7 cm low-attenuation mass along the inferoposterior aspect of the right kidney, compatible with cyst.  - Nonspecific mildly FDG avid low-attenuation left adrenal nodule, possibly an adenoma. This may be further evaluated with contrast-enhanced CT or MRI.    Patient reports stopping coumadin 2 weeks ago, echo 8/25/22 in chart    Scheduled for RUL mass biopsy on 9/14/22 and followed by right VATs, right upper lobectomy 9/22/22  Patient denies any claudication, chest pain or pressure, SOB, walked for 30 minutes yesterday without s/s

## 2022-09-06 NOTE — H&P PST ADULT - NEGATIVE GENERAL GENITOURINARY SYMPTOMS
no hematuria/no flank pain L/no flank pain R no hematuria/no flank pain L/no flank pain R/no incontinence/no dysuria

## 2022-09-06 NOTE — H&P PST ADULT - PROBLEM SELECTOR PLAN 3
Assessment and Plan: Patient instructed to take metoprolol on day of procedure, verbalized understanding.

## 2022-09-08 ENCOUNTER — APPOINTMENT (OUTPATIENT)
Dept: CARDIOLOGY | Facility: CLINIC | Age: 77
End: 2022-09-08

## 2022-09-08 ENCOUNTER — NON-APPOINTMENT (OUTPATIENT)
Age: 77
End: 2022-09-08

## 2022-09-08 VITALS
WEIGHT: 180 LBS | DIASTOLIC BLOOD PRESSURE: 67 MMHG | HEART RATE: 83 BPM | SYSTOLIC BLOOD PRESSURE: 100 MMHG | HEIGHT: 72 IN | OXYGEN SATURATION: 99 % | TEMPERATURE: 97.5 F | RESPIRATION RATE: 16 BRPM | BODY MASS INDEX: 24.38 KG/M2

## 2022-09-08 PROCEDURE — 93000 ELECTROCARDIOGRAM COMPLETE: CPT

## 2022-09-08 PROCEDURE — 99215 OFFICE O/P EST HI 40 MIN: CPT

## 2022-09-12 PROBLEM — R91.8 OTHER NONSPECIFIC ABNORMAL FINDING OF LUNG FIELD: Chronic | Status: ACTIVE | Noted: 2022-09-06

## 2022-09-12 PROBLEM — C44.92 SQUAMOUS CELL CARCINOMA OF SKIN, UNSPECIFIED: Chronic | Status: ACTIVE | Noted: 2022-09-06

## 2022-09-12 LAB — SARS-COV-2 N GENE NPH QL NAA+PROBE: NOT DETECTED

## 2022-09-14 ENCOUNTER — RESULT REVIEW (OUTPATIENT)
Age: 77
End: 2022-09-14

## 2022-09-14 ENCOUNTER — OUTPATIENT (OUTPATIENT)
Dept: OUTPATIENT SERVICES | Facility: HOSPITAL | Age: 77
LOS: 1 days | End: 2022-09-14

## 2022-09-14 DIAGNOSIS — Z98.890 OTHER SPECIFIED POSTPROCEDURAL STATES: Chronic | ICD-10-CM

## 2022-09-14 DIAGNOSIS — R91.8 OTHER NONSPECIFIC ABNORMAL FINDING OF LUNG FIELD: ICD-10-CM

## 2022-09-14 PROCEDURE — 88360 TUMOR IMMUNOHISTOCHEM/MANUAL: CPT | Mod: 26

## 2022-09-14 PROCEDURE — 88342 IMHCHEM/IMCYTCHM 1ST ANTB: CPT | Mod: 26,59

## 2022-09-14 PROCEDURE — 32408 CORE NDL BX LNG/MED PERQ: CPT

## 2022-09-14 PROCEDURE — 71045 X-RAY EXAM CHEST 1 VIEW: CPT | Mod: 26

## 2022-09-14 PROCEDURE — 88173 CYTOPATH EVAL FNA REPORT: CPT | Mod: 26

## 2022-09-14 PROCEDURE — 88305 TISSUE EXAM BY PATHOLOGIST: CPT | Mod: 26

## 2022-09-15 LAB — NON-GYNECOLOGICAL CYTOLOGY STUDY: SIGNIFICANT CHANGE UP

## 2022-09-16 ENCOUNTER — NON-APPOINTMENT (OUTPATIENT)
Age: 77
End: 2022-09-16

## 2022-09-17 NOTE — CARDIOLOGY SUMMARY
[___] : [unfilled] [LVEF ___%] : LVEF [unfilled]% [___] : [unfilled] [de-identified] : 5/11/2022 normal sinus rhythm anteroseptal pattern anterior ST segment elevation \par 5/27/22 anterior mi AI lafb rsr pac [de-identified] : 5/20/22 EF .3  LVT 1x1.3 cm decreased RV function RV Enlargement MARCOS \par 8/25/2022 cardiomyopathy with resolution of a left ventricular thrombus [de-identified] : minimal viability in infarcted territories [de-identified] : 5/11/22 lad .7 diag .7 RCA 1.00 circ normal

## 2022-09-17 NOTE — HISTORY OF PRESENT ILLNESS
[Preoperative Visit] : for a medical evaluation prior to surgery [Scheduled Procedure ___] : a [unfilled] [Date of Surgery ___] : on [unfilled] [Surgeon Name ___] : surgeon: [unfilled] [de-identified] : BLANCA MRN [id number] [FreeTextEntry1] : This is a 76 y/o M with pmhx of Depression, bipolar, BPH, COVID Winter 2021(recovered), hx of STEMI and cardiogenic shock in may 2022, presented to the [facility] ED for new onset hemoptysis.  Known R lung\par nodule, suspicious for CA, pending IR biopsy. Patient with recent hospitalization where he had a STEMI 5/2022, found to have 100% occlusion RCA and 70% occlusion LAD, needed CCU for IABP support and then\par weaned off. Viability showing poor salvageability and so he was medically managed. Also during this time, TTE with EF 30%, LV thrombus for which he was started on Coumadin, DAPT. He was to continue Coumadin x 3 months, last dose up until 8/26 for which he was compliant.He was hospitalized at [facility] with hemoptysis . He underwent echo 8/25/22 that showed a cardiomyopathy and resolution of the left ventricular thrombus. I coordinated care with the Hospitalist service and warfarin was discontinued. He was discharged on aspirin alone.  He comes today for clarification of  his overall cardiovascular risk prior to a planned biopsy and surgical excision of a lung mass. He was advised to undergo a complete cardiac evaluation. He denies chest pains shortness of breath or loss of consciousnes.

## 2022-09-17 NOTE — ASSESSMENT
[FreeTextEntry1] : Without evidence for recent infarction overt heart failure or unstable angina and based upon satisfactory clinical exam, Mr Becerra  may undergo planned low risk needle biopsy at an  ASA class III anesthesia risk. Without evidence for recent infarction overt heart failure or unstable angina and based upon satisfactory clinical exam, Mr Becerra  may undergo planned high risk VATS RU Lobectomy at an  ASA class III anesthesia risk. We discussed recent myocardial event which poses increased risk along with severe left ventricular dysfunction and advanced coronary disease which both impose increased cardiovascular risk for high risk surgery , however the benefits of a diagnostic procedure along with surgical intervention outweigh the risk of the procedure given the concern over an advancing carcinoma now with hemoptysis.  Mr Becerra understands inherent risks and wishes to proceed. All routes remain hazardous. \par \par medication management for an ischemic cardiomyopathy\par Aspirin is held as of 9/9/22 , 5 days prior to a needle biopsy and resumed when able from an IR standpoint. Would consider continuing aspirin 81 mg post needle biopsy for planned VATS if OK from a surgical standpoint. Furosemide is continued at 40 mg per day and would continue atorvastatin 80 mg metoprolol succinate 100 mg and consider heart failure meds such as ACE inhibitor and spironolactone and Farxiga when stabilized surgically and hemodynamically. A defibrillator may also need to be considered if ejection fraction less than 30% after GDMT for 40 days.\par \par This represents a high amount of medical decision making based upon two or more more chronic illnesses  and perioperative management of  anticoagulants.\par \par discussed with wife at bedside dr Dahl\par \par Addendum\par 9/17/2022\par Recent biopsy demonstrates adenocarcinoma

## 2022-09-19 ENCOUNTER — APPOINTMENT (OUTPATIENT)
Dept: THORACIC SURGERY | Facility: CLINIC | Age: 77
End: 2022-09-19

## 2022-09-19 PROCEDURE — 99443: CPT | Mod: 95

## 2022-09-20 DIAGNOSIS — R91.8 OTHER NONSPECIFIC ABNORMAL FINDING OF LUNG FIELD: ICD-10-CM

## 2022-09-20 DIAGNOSIS — R25.2 CRAMP AND SPASM: ICD-10-CM

## 2022-09-20 LAB — SARS-COV-2 N GENE NPH QL NAA+PROBE: NOT DETECTED

## 2022-09-22 ENCOUNTER — APPOINTMENT (OUTPATIENT)
Dept: ULTRASOUND IMAGING | Facility: CLINIC | Age: 77
End: 2022-09-22

## 2022-09-22 ENCOUNTER — NON-APPOINTMENT (OUTPATIENT)
Age: 77
End: 2022-09-22

## 2022-09-22 ENCOUNTER — OUTPATIENT (OUTPATIENT)
Dept: OUTPATIENT SERVICES | Facility: HOSPITAL | Age: 77
LOS: 1 days | End: 2022-09-22
Payer: MEDICARE

## 2022-09-22 ENCOUNTER — RESULT REVIEW (OUTPATIENT)
Age: 77
End: 2022-09-22

## 2022-09-22 DIAGNOSIS — Z98.890 OTHER SPECIFIED POSTPROCEDURAL STATES: Chronic | ICD-10-CM

## 2022-09-22 DIAGNOSIS — R25.2 CRAMP AND SPASM: ICD-10-CM

## 2022-09-22 PROCEDURE — 93970 EXTREMITY STUDY: CPT | Mod: 26

## 2022-09-22 PROCEDURE — 93970 EXTREMITY STUDY: CPT

## 2022-09-22 NOTE — HISTORY OF PRESENT ILLNESS
[Home] : at home, [unfilled] , at the time of the visit. [Medical Office: ([facility])___] : at the medical office located in  [Verbal consent obtained from patient] : the patient, [unfilled] [FreeTextEntry1] : \par Mr. ELYSE MALAVE, 77 year old male, never smoker, w/ hx of bipolar, BPH, SCC of leg, CHF, CAD, hospitalized on May 11-17/2022 for acute MI s/p cardiac Cath with no intervention (LHC showed 100% occlusion for right coronary artery and 70% occlusion of left anterior descending artery. The arteries were not intervened upon due to viability study showing no viability in the areas supplied by the occluded arteries), cardiogenic shock, found to have new 1x 1.3cm LV thrombus on Plavix and Coumadin (Coumadin will be completed on 08/26/2022), who f/u with cardiologist and c/o SOB, CXR showed RUL nodule, PET/CT showed FDG avid RUL mass, referred by Dr. Nicholas Espinal. \par \par CXR on 07/05/2022: for SOB\par -  Ill-defined nodular opacity seen in the right upper lobe for which further evaluation with CT scan imaging chest is recommended.\par \par CT chest on 07/06/2022: \par - A 3.5 x 2 cm part solid mass containing 2.8 cm solid component is noted in the right upper lobe. \par - 0.3 cm ill-defined nodule is noted in the left upper lobe.\par - Few linear opacities representing atelectasis are noted in both lungs. \par - Small bilateral pleural effusions are noted.\par \par PET/CT on 07/28/2022:\par - Mildly FDG avid mixed solid and groundglass right upper lung mass measuring 3.5 x 2 cm with SUV 4.8 (image 82). A 0.3 cm left upper lung nodule (image 75) is too small to characterize.\par - Small bilateral pleural effusion not significantly changed from comparison CT with minimal FDG avidity with SUV 2.2. \par - Large photopenic 8.4 x 7.7 cm low-attenuation mass along the inferoposterior aspect of the right kidney, compatible with cyst.\par - Nonspecific mildly FDG avid low-attenuation left adrenal nodule, possibly an adenoma. This may be further evaluated with contrast-enhanced CT or MRI.\par - Heterogeneous FDG activity in the soft tissue associated with surgical clips overlying the symphysis pubis, probably related from prior procedure. \par \par PFTs on 08/03/2022: FEV1 2.41 (70%), DLCO 62%. \par \par Patient was seen on 08/08/2022, RUL mass is suspicious for primary lung cancer, however, given patient recent MI, high risk for lung surgery, I recommended a CT guided bx for definitive diagnosis before surgical intervention. Patient was evaluated by IR on 08/11/2022, recommended cardiac clearance prior to procedure. Patient is also currently on Plavix and Coumadin (Coumadin will be completed on 08/26/2022). \par \par MRI of Abdomen on 08/10/2022:\par - 2 cm left adrenal nodule with imaging features consistent with an adenoma\par - Right renal cyst measuring 8 cm\par \par Patient was seen on 08/22/2022 and sent to ED for hemoptysis. AC was stopped, and patient is currently on ASA 81 mg. \par \par Patient is s/p CT guided bx of RUL on 09/14/2022. Path showed positive for malignant cells. Adenocarcinoma. PD-L1 0%. \par \par Patient is followed today via Telephonic visit. \par

## 2022-09-22 NOTE — CONSULT LETTER
[Dear  ___] : Dear  [unfilled], [Consult Letter:] : I had the pleasure of evaluating your patient, [unfilled]. [( Thank you for referring [unfilled] for consultation for _____ )] : Thank you for referring [unfilled] for consultation for [unfilled] [Please see my note below.] : Please see my note below. [Consult Closing:] : Thank you very much for allowing me to participate in the care of this patient.  If you have any questions, please do not hesitate to contact me. [Sincerely,] : Sincerely, [FreeTextEntry2] : Dr. Nicholas Espinal (Pulm/Ref) [FreeTextEntry3] : Stevie Padilla MD \par Attending Surgeon \par Division of Thoracic Surgery \par , Cardiovascular and Thoracic Surgery \par [facility] [employer] [facility]\par \par

## 2022-09-22 NOTE — ASSESSMENT
[FreeTextEntry1] : \par Mr. ELYSE MALAVE, 77 year old male, never smoker, w/ hx of bipolar, BPH, SCC of leg, CHF, CAD, hospitalized on May 11-17/2022 for acute MI s/p cardiac Cath with no intervention (LHC showed 100% occlusion for right coronary artery and 70% occlusion of left anterior descending artery. The arteries were not intervened upon due to viability study showing no viability in the areas supplied by the occluded arteries), cardiogenic shock, found to have new 1x 1.3cm LV thrombus on Plavix and Coumadin (Coumadin will be completed on 08/26/2022), who f/u with cardiologist and c/o SOB, CXR showed RUL nodule, PET/CT showed FDG avid RUL mass, referred by Dr. Nicholas Espinal. \par \par Patient was seen on 08/08/2022, RUL mass is suspicious for primary lung cancer, however, given patient recent MI, high risk for lung surgery, I recommended a CT guided bx for definitive diagnosis before surgical intervention. Patient was evaluated by IR on 08/11/2022, recommended cardiac clearance prior to procedure. Patient is also currently on Plavix and Coumadin (Coumadin will be completed on 08/26/2022). \par \par Patient was seen on 08/22/2022 and sent to ED for hemoptysis. AC was stopped, and patient is currently on ASA 81 mg. \par \par Patient is s/p CT guided bx of RUL on 09/14/2022. Path showed positive for malignant cells. Adenocarcinoma. PD-L1 0%. \par \par I have reviewed the patient's medical records and diagnostic images at time of this office consultation and have made the following recommendation:\par 1. Path reviewed and explained to patient, bx proven lung cancer, will proceed with surgical resection as scheduled. Risks and benefits and alternatives explained to patient, all questions answered, patient agreed to proceed with surgery.\par \par \par I personally performed the services described in the documentation, reviewed the documentation recorded by the scribe in my presence and it accurately and completely records my words and actions.\par \par I, Love Lopez NP, am scribing for and the presence of ERNESTINA Mandujano, the following sections HISTORY OF PRESENT ILLNESS, PAST MEDICAL/FAMILY/SOCIAL HISTORY; REVIEW OF SYSTEMS; VITAL SIGNS; PHYSICAL EXAM; DISPOSITION.

## 2022-09-23 ENCOUNTER — APPOINTMENT (OUTPATIENT)
Dept: THORACIC SURGERY | Facility: HOSPITAL | Age: 77
End: 2022-09-23

## 2022-09-23 ENCOUNTER — INPATIENT (INPATIENT)
Facility: HOSPITAL | Age: 77
LOS: 7 days | Discharge: HOME | End: 2022-10-01
Attending: THORACIC SURGERY (CARDIOTHORACIC VASCULAR SURGERY) | Admitting: THORACIC SURGERY (CARDIOTHORACIC VASCULAR SURGERY)

## 2022-09-23 ENCOUNTER — TRANSCRIPTION ENCOUNTER (OUTPATIENT)
Age: 77
End: 2022-09-23

## 2022-09-23 ENCOUNTER — RESULT REVIEW (OUTPATIENT)
Age: 77
End: 2022-09-23

## 2022-09-23 ENCOUNTER — NON-APPOINTMENT (OUTPATIENT)
Age: 77
End: 2022-09-23

## 2022-09-23 VITALS
SYSTOLIC BLOOD PRESSURE: 100 MMHG | HEIGHT: 71.5 IN | HEART RATE: 83 BPM | TEMPERATURE: 98 F | WEIGHT: 177.91 LBS | DIASTOLIC BLOOD PRESSURE: 69 MMHG | OXYGEN SATURATION: 99 % | RESPIRATION RATE: 16 BRPM

## 2022-09-23 DIAGNOSIS — Z98.890 OTHER SPECIFIED POSTPROCEDURAL STATES: Chronic | ICD-10-CM

## 2022-09-23 DIAGNOSIS — R91.8 OTHER NONSPECIFIC ABNORMAL FINDING OF LUNG FIELD: ICD-10-CM

## 2022-09-23 LAB
ALBUMIN SERPL ELPH-MCNC: 3.5 G/DL — SIGNIFICANT CHANGE UP (ref 3.3–5)
ALBUMIN SERPL ELPH-MCNC: 3.5 G/DL — SIGNIFICANT CHANGE UP (ref 3.3–5)
ALP SERPL-CCNC: 165 U/L — HIGH (ref 40–120)
ALP SERPL-CCNC: 166 U/L — HIGH (ref 40–120)
ALT FLD-CCNC: 140 U/L — HIGH (ref 4–41)
ALT FLD-CCNC: 142 U/L — HIGH (ref 4–41)
ANION GAP SERPL CALC-SCNC: 16 MMOL/L — HIGH (ref 7–14)
ANION GAP SERPL CALC-SCNC: 17 MMOL/L — HIGH (ref 7–14)
ANION GAP SERPL CALC-SCNC: 18 MMOL/L — HIGH (ref 7–14)
APTT BLD: 25.6 SEC — LOW (ref 27–36.3)
AST SERPL-CCNC: 202 U/L — HIGH (ref 4–40)
AST SERPL-CCNC: 204 U/L — HIGH (ref 4–40)
B-OH-BUTYR SERPL-SCNC: <0 MMOL/L — SIGNIFICANT CHANGE UP (ref 0–0.4)
BASE EXCESS BLDV CALC-SCNC: -11.8 MMOL/L — LOW (ref -2–3)
BILIRUB SERPL-MCNC: 2.8 MG/DL — HIGH (ref 0.2–1.2)
BILIRUB SERPL-MCNC: 2.8 MG/DL — HIGH (ref 0.2–1.2)
BLD GP AB SCN SERPL QL: NEGATIVE — SIGNIFICANT CHANGE UP
BLOOD GAS ARTERIAL COMPREHENSIVE RESULT: SIGNIFICANT CHANGE UP
BLOOD GAS VENOUS COMPREHENSIVE RESULT: SIGNIFICANT CHANGE UP
BUN SERPL-MCNC: 46 MG/DL — HIGH (ref 7–23)
CALCIUM SERPL-MCNC: 8.1 MG/DL — LOW (ref 8.4–10.5)
CALCIUM SERPL-MCNC: 8.3 MG/DL — LOW (ref 8.4–10.5)
CALCIUM SERPL-MCNC: 8.4 MG/DL — SIGNIFICANT CHANGE UP (ref 8.4–10.5)
CHLORIDE BLDV-SCNC: 92 MMOL/L — LOW (ref 96–108)
CHLORIDE SERPL-SCNC: 94 MMOL/L — LOW (ref 98–107)
CK MB CFR SERPL CALC: 6 NG/ML — SIGNIFICANT CHANGE UP
CO2 BLDV-SCNC: 20.1 MMOL/L — LOW (ref 22–26)
CO2 SERPL-SCNC: 14 MMOL/L — LOW (ref 22–31)
CO2 SERPL-SCNC: 15 MMOL/L — LOW (ref 22–31)
CO2 SERPL-SCNC: 18 MMOL/L — LOW (ref 22–31)
CREAT SERPL-MCNC: 1.56 MG/DL — HIGH (ref 0.5–1.3)
CREAT SERPL-MCNC: 1.61 MG/DL — HIGH (ref 0.5–1.3)
CREAT SERPL-MCNC: 1.63 MG/DL — HIGH (ref 0.5–1.3)
EGFR: 43 ML/MIN/1.73M2 — LOW
EGFR: 44 ML/MIN/1.73M2 — LOW
EGFR: 45 ML/MIN/1.73M2 — LOW
GAS PNL BLDA: SIGNIFICANT CHANGE UP
GAS PNL BLDV: 120 MMOL/L — CRITICAL LOW (ref 136–145)
GLUCOSE BLDC GLUCOMTR-MCNC: 171 MG/DL — HIGH (ref 70–99)
GLUCOSE BLDV-MCNC: 159 MG/DL — HIGH (ref 70–99)
GLUCOSE SERPL-MCNC: 163 MG/DL — HIGH (ref 70–99)
GLUCOSE SERPL-MCNC: 165 MG/DL — HIGH (ref 70–99)
GLUCOSE SERPL-MCNC: 196 MG/DL — HIGH (ref 70–99)
HCO3 BLDV-SCNC: 18 MMOL/L — LOW (ref 22–29)
HCT VFR BLD CALC: 39.8 % — SIGNIFICANT CHANGE UP (ref 39–50)
HCT VFR BLDA CALC: 39 % — SIGNIFICANT CHANGE UP (ref 39–51)
HGB BLD CALC-MCNC: 13.1 G/DL — SIGNIFICANT CHANGE UP (ref 13–17)
HGB BLD-MCNC: 12.9 G/DL — LOW (ref 13–17)
INR BLD: 1.51 RATIO — HIGH (ref 0.88–1.16)
LACTATE BLDV-MCNC: 4.4 MMOL/L — CRITICAL HIGH (ref 0.5–2)
MAGNESIUM SERPL-MCNC: 2.4 MG/DL — SIGNIFICANT CHANGE UP (ref 1.6–2.6)
MAGNESIUM SERPL-MCNC: 2.5 MG/DL — SIGNIFICANT CHANGE UP (ref 1.6–2.6)
MAGNESIUM SERPL-MCNC: 2.5 MG/DL — SIGNIFICANT CHANGE UP (ref 1.6–2.6)
MCHC RBC-ENTMCNC: 30.8 PG — SIGNIFICANT CHANGE UP (ref 27–34)
MCHC RBC-ENTMCNC: 32.4 GM/DL — SIGNIFICANT CHANGE UP (ref 32–36)
MCV RBC AUTO: 95 FL — SIGNIFICANT CHANGE UP (ref 80–100)
NRBC # BLD: 0 /100 WBCS — SIGNIFICANT CHANGE UP (ref 0–0)
NRBC # FLD: 0.04 K/UL — HIGH (ref 0–0)
PCO2 BLDV: 59 MMHG — HIGH (ref 42–55)
PH BLDV: 7.1 — LOW (ref 7.32–7.43)
PHOSPHATE SERPL-MCNC: 6.2 MG/DL — HIGH (ref 2.5–4.5)
PHOSPHATE SERPL-MCNC: 7.5 MG/DL — HIGH (ref 2.5–4.5)
PHOSPHATE SERPL-MCNC: 7.6 MG/DL — HIGH (ref 2.5–4.5)
PLATELET # BLD AUTO: 265 K/UL — SIGNIFICANT CHANGE UP (ref 150–400)
PO2 BLDV: 53 MMHG — SIGNIFICANT CHANGE UP
POTASSIUM BLDV-SCNC: 6.8 MMOL/L — CRITICAL HIGH (ref 3.5–5.1)
POTASSIUM SERPL-MCNC: 4.4 MMOL/L — SIGNIFICANT CHANGE UP (ref 3.5–5.3)
POTASSIUM SERPL-MCNC: 6.6 MMOL/L — CRITICAL HIGH (ref 3.5–5.3)
POTASSIUM SERPL-MCNC: 6.6 MMOL/L — CRITICAL HIGH (ref 3.5–5.3)
POTASSIUM SERPL-SCNC: 4.4 MMOL/L — SIGNIFICANT CHANGE UP (ref 3.5–5.3)
POTASSIUM SERPL-SCNC: 6.6 MMOL/L — CRITICAL HIGH (ref 3.5–5.3)
POTASSIUM SERPL-SCNC: 6.6 MMOL/L — CRITICAL HIGH (ref 3.5–5.3)
PROT SERPL-MCNC: 6.7 G/DL — SIGNIFICANT CHANGE UP (ref 6–8.3)
PROT SERPL-MCNC: 6.8 G/DL — SIGNIFICANT CHANGE UP (ref 6–8.3)
PROTHROM AB SERPL-ACNC: 17.6 SEC — HIGH (ref 10.5–13.4)
RBC # BLD: 4.19 M/UL — LOW (ref 4.2–5.8)
RBC # FLD: 15.9 % — HIGH (ref 10.3–14.5)
RH IG SCN BLD-IMP: POSITIVE — SIGNIFICANT CHANGE UP
SAO2 % BLDV: 73.7 % — SIGNIFICANT CHANGE UP
SODIUM SERPL-SCNC: 125 MMOL/L — LOW (ref 135–145)
SODIUM SERPL-SCNC: 125 MMOL/L — LOW (ref 135–145)
SODIUM SERPL-SCNC: 130 MMOL/L — LOW (ref 135–145)
TROPONIN T, HIGH SENSITIVITY RESULT: 42 NG/L — SIGNIFICANT CHANGE UP
WBC # BLD: 13.65 K/UL — HIGH (ref 3.8–10.5)
WBC # FLD AUTO: 13.65 K/UL — HIGH (ref 3.8–10.5)

## 2022-09-23 PROCEDURE — 88305 TISSUE EXAM BY PATHOLOGIST: CPT | Mod: 26,59

## 2022-09-23 PROCEDURE — 88309 TISSUE EXAM BY PATHOLOGIST: CPT | Mod: 26

## 2022-09-23 PROCEDURE — 99291 CRITICAL CARE FIRST HOUR: CPT

## 2022-09-23 PROCEDURE — 31622 DX BRONCHOSCOPE/WASH: CPT

## 2022-09-23 PROCEDURE — 71045 X-RAY EXAM CHEST 1 VIEW: CPT | Mod: 26

## 2022-09-23 PROCEDURE — 32663 THORACOSCOPY W/LOBECTOMY: CPT | Mod: 80

## 2022-09-23 PROCEDURE — 88112 CYTOPATH CELL ENHANCE TECH: CPT | Mod: 26

## 2022-09-23 PROCEDURE — 88313 SPECIAL STAINS GROUP 2: CPT | Mod: 26

## 2022-09-23 PROCEDURE — 88360 TUMOR IMMUNOHISTOCHEM/MANUAL: CPT | Mod: 26

## 2022-09-23 PROCEDURE — 99223 1ST HOSP IP/OBS HIGH 75: CPT

## 2022-09-23 PROCEDURE — 32663 THORACOSCOPY W/LOBECTOMY: CPT

## 2022-09-23 PROCEDURE — 99292 CRITICAL CARE ADDL 30 MIN: CPT

## 2022-09-23 PROCEDURE — 88342 IMHCHEM/IMCYTCHM 1ST ANTB: CPT | Mod: 26,59

## 2022-09-23 PROCEDURE — 93010 ELECTROCARDIOGRAM REPORT: CPT

## 2022-09-23 DEVICE — STAPLER ETHICON ECHELON ENDOPATH VASCULAR 35MM WHITE RELOAD: Type: IMPLANTABLE DEVICE | Site: RIGHT | Status: FUNCTIONAL

## 2022-09-23 DEVICE — STAPLER ETHICON GST ECHELON 60MM GREEN RELOAD: Type: IMPLANTABLE DEVICE | Site: RIGHT | Status: FUNCTIONAL

## 2022-09-23 DEVICE — CLIP APPLIER COVIDIEN ENDOCLIP III 5MM: Type: IMPLANTABLE DEVICE | Site: RIGHT | Status: FUNCTIONAL

## 2022-09-23 DEVICE — KIT CVC 2LUM 7FRX16CM BLU FLX TIP: Type: IMPLANTABLE DEVICE | Site: RIGHT | Status: FUNCTIONAL

## 2022-09-23 DEVICE — STAPLER ETHICON GST ECHELON 60MM BLACK RELOAD: Type: IMPLANTABLE DEVICE | Site: RIGHT | Status: FUNCTIONAL

## 2022-09-23 DEVICE — CHEST DRAIN THORACIC ARGYLE PVC 24FR STRAIGHT: Type: IMPLANTABLE DEVICE | Site: RIGHT | Status: FUNCTIONAL

## 2022-09-23 RX ORDER — DEXMEDETOMIDINE HYDROCHLORIDE IN 0.9% SODIUM CHLORIDE 4 UG/ML
0.7 INJECTION INTRAVENOUS
Qty: 400 | Refills: 0 | Status: DISCONTINUED | OUTPATIENT
Start: 2022-09-23 | End: 2022-09-24

## 2022-09-23 RX ORDER — THIAMINE MONONITRATE (VIT B1) 100 MG
250 TABLET ORAL ONCE
Refills: 0 | Status: DISCONTINUED | OUTPATIENT
Start: 2022-09-23 | End: 2022-09-23

## 2022-09-23 RX ORDER — ALBUMIN HUMAN 25 %
250 VIAL (ML) INTRAVENOUS ONCE
Refills: 0 | Status: COMPLETED | OUTPATIENT
Start: 2022-09-23 | End: 2022-09-23

## 2022-09-23 RX ORDER — CHLORHEXIDINE GLUCONATE 213 G/1000ML
15 SOLUTION TOPICAL EVERY 12 HOURS
Refills: 0 | Status: DISCONTINUED | OUTPATIENT
Start: 2022-09-23 | End: 2022-09-23

## 2022-09-23 RX ORDER — INSULIN LISPRO 100/ML
VIAL (ML) SUBCUTANEOUS EVERY 6 HOURS
Refills: 0 | Status: DISCONTINUED | OUTPATIENT
Start: 2022-09-23 | End: 2022-09-24

## 2022-09-23 RX ORDER — SODIUM BICARBONATE 1 MEQ/ML
50 SYRINGE (ML) INTRAVENOUS ONCE
Refills: 0 | Status: COMPLETED | OUTPATIENT
Start: 2022-09-23 | End: 2022-09-23

## 2022-09-23 RX ORDER — ALBUTEROL 90 UG/1
2.5 AEROSOL, METERED ORAL EVERY 6 HOURS
Refills: 0 | Status: DISCONTINUED | OUTPATIENT
Start: 2022-09-23 | End: 2022-09-24

## 2022-09-23 RX ORDER — THIAMINE MONONITRATE (VIT B1) 100 MG
500 TABLET ORAL ONCE
Refills: 0 | Status: COMPLETED | OUTPATIENT
Start: 2022-09-23 | End: 2022-09-23

## 2022-09-23 RX ORDER — HEPARIN SODIUM 5000 [USP'U]/ML
5000 INJECTION INTRAVENOUS; SUBCUTANEOUS EVERY 8 HOURS
Refills: 0 | Status: DISCONTINUED | OUTPATIENT
Start: 2022-09-23 | End: 2022-10-01

## 2022-09-23 RX ORDER — HEPARIN SODIUM 5000 [USP'U]/ML
5000 INJECTION INTRAVENOUS; SUBCUTANEOUS ONCE
Refills: 0 | Status: COMPLETED | OUTPATIENT
Start: 2022-09-23 | End: 2022-09-23

## 2022-09-23 RX ORDER — CALCIUM GLUCONATE 100 MG/ML
2 VIAL (ML) INTRAVENOUS ONCE
Refills: 0 | Status: COMPLETED | OUTPATIENT
Start: 2022-09-23 | End: 2022-09-23

## 2022-09-23 RX ORDER — SENNA PLUS 8.6 MG/1
2 TABLET ORAL AT BEDTIME
Refills: 0 | Status: DISCONTINUED | OUTPATIENT
Start: 2022-09-23 | End: 2022-10-01

## 2022-09-23 RX ORDER — IPRATROPIUM BROMIDE 0.2 MG/ML
500 SOLUTION, NON-ORAL INHALATION EVERY 6 HOURS
Refills: 0 | Status: DISCONTINUED | OUTPATIENT
Start: 2022-09-23 | End: 2022-09-24

## 2022-09-23 RX ORDER — ALBUMIN HUMAN 25 %
250 VIAL (ML) INTRAVENOUS ONCE
Refills: 0 | Status: DISCONTINUED | OUTPATIENT
Start: 2022-09-23 | End: 2022-09-24

## 2022-09-23 RX ORDER — DOBUTAMINE HCL 250MG/20ML
2 VIAL (ML) INTRAVENOUS
Qty: 500 | Refills: 0 | Status: DISCONTINUED | OUTPATIENT
Start: 2022-09-23 | End: 2022-09-28

## 2022-09-23 RX ORDER — SODIUM CHLORIDE 9 MG/ML
10 INJECTION INTRAMUSCULAR; INTRAVENOUS; SUBCUTANEOUS
Refills: 0 | Status: DISCONTINUED | OUTPATIENT
Start: 2022-09-23 | End: 2022-09-24

## 2022-09-23 RX ORDER — CHLORHEXIDINE GLUCONATE 213 G/1000ML
1 SOLUTION TOPICAL
Refills: 0 | Status: DISCONTINUED | OUTPATIENT
Start: 2022-09-23 | End: 2022-10-01

## 2022-09-23 RX ORDER — DEXTROSE 50 % IN WATER 50 %
50 SYRINGE (ML) INTRAVENOUS ONCE
Refills: 0 | Status: COMPLETED | OUTPATIENT
Start: 2022-09-23 | End: 2022-09-23

## 2022-09-23 RX ORDER — INSULIN HUMAN 100 [IU]/ML
10 INJECTION, SOLUTION SUBCUTANEOUS ONCE
Refills: 0 | Status: COMPLETED | OUTPATIENT
Start: 2022-09-23 | End: 2022-09-23

## 2022-09-23 RX ORDER — SODIUM CHLORIDE 9 MG/ML
1000 INJECTION, SOLUTION INTRAVENOUS
Refills: 0 | Status: DISCONTINUED | OUTPATIENT
Start: 2022-09-23 | End: 2022-09-23

## 2022-09-23 RX ORDER — SODIUM CHLORIDE 9 MG/ML
250 INJECTION, SOLUTION INTRAVENOUS ONCE
Refills: 0 | Status: COMPLETED | OUTPATIENT
Start: 2022-09-23 | End: 2022-09-23

## 2022-09-23 RX ADMIN — CHLORHEXIDINE GLUCONATE 1 APPLICATION(S): 213 SOLUTION TOPICAL at 18:48

## 2022-09-23 RX ADMIN — Medication 125 MILLILITER(S): at 16:04

## 2022-09-23 RX ADMIN — SODIUM CHLORIDE 3 MILLILITER(S): 9 INJECTION INTRAMUSCULAR; INTRAVENOUS; SUBCUTANEOUS at 21:37

## 2022-09-23 RX ADMIN — Medication 105 MILLIGRAM(S): at 21:01

## 2022-09-23 RX ADMIN — DEXMEDETOMIDINE HYDROCHLORIDE IN 0.9% SODIUM CHLORIDE 14.1 MICROGRAM(S)/KG/HR: 4 INJECTION INTRAVENOUS at 14:22

## 2022-09-23 RX ADMIN — Medication 2.5 MICROGRAM(S)/KG/MIN: at 14:50

## 2022-09-23 RX ADMIN — Medication 50 MILLILITER(S): at 15:37

## 2022-09-23 RX ADMIN — DEXMEDETOMIDINE HYDROCHLORIDE IN 0.9% SODIUM CHLORIDE 14.1 MICROGRAM(S)/KG/HR: 4 INJECTION INTRAVENOUS at 22:21

## 2022-09-23 RX ADMIN — INSULIN HUMAN 10 UNIT(S): 100 INJECTION, SOLUTION SUBCUTANEOUS at 15:36

## 2022-09-23 RX ADMIN — HEPARIN SODIUM 5000 UNIT(S): 5000 INJECTION INTRAVENOUS; SUBCUTANEOUS at 15:38

## 2022-09-23 RX ADMIN — HEPARIN SODIUM 5000 UNIT(S): 5000 INJECTION INTRAVENOUS; SUBCUTANEOUS at 06:28

## 2022-09-23 RX ADMIN — SODIUM CHLORIDE 250 MILLILITER(S): 9 INJECTION, SOLUTION INTRAVENOUS at 16:05

## 2022-09-23 RX ADMIN — CHLORHEXIDINE GLUCONATE 15 MILLILITER(S): 213 SOLUTION TOPICAL at 19:15

## 2022-09-23 RX ADMIN — SODIUM CHLORIDE 3 MILLILITER(S): 9 INJECTION INTRAMUSCULAR; INTRAVENOUS; SUBCUTANEOUS at 15:00

## 2022-09-23 RX ADMIN — HEPARIN SODIUM 5000 UNIT(S): 5000 INJECTION INTRAVENOUS; SUBCUTANEOUS at 21:01

## 2022-09-23 RX ADMIN — Medication 1: at 23:27

## 2022-09-23 RX ADMIN — Medication 2: at 17:51

## 2022-09-23 RX ADMIN — Medication 50 MILLIEQUIVALENT(S): at 15:04

## 2022-09-23 RX ADMIN — Medication 50 MILLIEQUIVALENT(S): at 15:05

## 2022-09-23 RX ADMIN — Medication 200 GRAM(S): at 15:47

## 2022-09-23 RX ADMIN — Medication 2.5 MICROGRAM(S)/KG/MIN: at 22:21

## 2022-09-23 NOTE — CONSULT NOTE ADULT - SUBJECTIVE AND OBJECTIVE BOX
Patient seen and evaluated at bedside    Reason for consult: C/F ACS    Updates:   Alerted by surgery provider that there is concern for an acute ischemic event in the OR during surgery requiring escalating pressor requirements. Immediately, responded to bedside went through the patient cardiac history while awaiting patient to return from OR with OR providers.     Patient returned from the OR with anesthesia reporting escalation of pressors with un-responsive BP and new ST elevations on monitor in the anterior/anterior septal leads. Patient has a significant cardiac history listed below.     By the time the patient returned to the CTU he had weaned off pressors     HPI:  77 yr old male presents with hx of bipolar, BPH, SCC of leg, CHF EF 25% severe segmental LV systolic dysfunction, CAD, hospitalized on May 2022 for acute MI s/p cardiac Cath with no intervention (revealed  RCA and 70% occlusion prox LAD, 1st diagonal 70% stenosis- medical management-no intervention, IABP used for elevated right sided pressures; cardiogenic shock, found to have new 1x 1.3cm LV thrombus on Plavix and Coumadin (Coumadin completed on 08/26/2022, Plavix stopped around same time per pt), c/o one episode of difficulty walking, chest discomfort  and SOB while in the pool, CXR showed RUL nodule, PET/CT showed FDG avid RUL mass    PET/CT on 07/28/2022:  - Mildly FDG avid mixed solid and groundglass right upper lung mass measuring 3.5 x 2 cm with SUV 4.8 (image 82). A 0.3 cm left upper lung nodule (image 75) is too small to characterize.  - Small bilateral pleural effusion not significantly changed from comparison CT with minimal FDG avidity with SUV 2.2.   - Large photopenic 8.4 x 7.7 cm low-attenuation mass along the inferoposterior aspect of the right kidney, compatible with cyst.  - Nonspecific mildly FDG avid low-attenuation left adrenal nodule, possibly an adenoma. This may be further evaluated with contrast-enhanced CT or MRI.    Patient reports stopping coumadin 2 weeks ago, echo 8/25/22 in chart    Scheduled for RUL mass biopsy on 9/14/22 and followed by right VATs, right upper lobectomy 9/22/22  Patient denies any claudication, chest pain or pressure, SOB, walked for 30 minutes yesterday without s/s (06 Sep 2022 10:56)      PMHx:   BPH (benign prostatic hyperplasia)    Bipolar disorder    Depression    Anxiety    Umbilical hernia, incarcerated    Inguinal hernia of right side without obstruction or gangrene    Depression    Constipation    Urinary retention    CAD (coronary artery disease)    SCC (squamous cell carcinoma)    Lung mass    Other nonspecific abnormal finding of lung field    LV (left ventricular) mural thrombus    History of inguinal hernia    Severe left ventricular systolic dysfunction    History of CHF (congestive heart failure)        PSHx:   Anxiety    Depression    BPH (benign prostatic hyperplasia)    History of arthroscopy of knee    History of tonsillectomy    History of hernia repair    H/O coronary angiogram        Allergies:  No Known Allergies      Home Meds:    Current Medications:   ALBUTerol    0.083% 2.5 milliGRAM(s) Nebulizer every 6 hours PRN  chlorhexidine 0.12% Liquid 15 milliLiter(s) Oral Mucosa every 12 hours  chlorhexidine 4% Liquid 1 Application(s) Topical <User Schedule>  dexMEDEtomidine Infusion 0.7 MICROgram(s)/kG/Hr IV Continuous <Continuous>  DOBUTamine Infusion 1.033 MICROgram(s)/kG/Min IV Continuous <Continuous>  heparin   Injectable 5000 Unit(s) SubCutaneous every 8 hours  ipratropium    for Nebulization 500 MICROGram(s) Nebulizer every 6 hours PRN  senna 2 Tablet(s) Oral at bedtime  sodium chloride 0.9% lock flush 3 milliLiter(s) IV Push every 8 hours  sodium chloride 0.9% lock flush 10 milliLiter(s) IV Push every 1 hour PRN      FAMILY HISTORY:  Family history of depression (Mother)    FH: CAD (coronary artery disease) (Sibling, Sibling)    Family history of diabetes mellitus (DM) (Sibling)        Social History:  Smoking History:  Alcohol Use:  Drug Use:    Review of Systems:  REVIEW OF SYSTEMS:  CONSTITUTIONAL: No weakness, fevers or chills  EYES/ENT: No visual changes;  No dysphagia  NECK: No pain or stiffness  RESPIRATORY: No cough, wheezing, hemoptysis; No shortness of breath  CARDIOVASCULAR: No chest pain or palpitations; No lower extremity edema  GASTROINTESTINAL: No abdominal or epigastric pain. No nausea, vomiting, or hematemesis; No diarrhea or constipation. No melena or hematochezia.  BACK: No back pain  GENITOURINARY: No dysuria, frequency or hematuria  NEUROLOGICAL: No numbness or weakness  SKIN: No itching, burning, rashes, or lesions   All other review of systems is negative unless indicated above.    [ ] All other systems negative  [ ] Unable to assess ROS due to    Physical Exam:  T(F): 98.1 (09-23), Max: 98.1 (09-23)  HR: 78 (09-23) (78 - 83)  BP: 100/69 (09-23) (100/69 - 100/69)  RR: 16 (09-23)  SpO2: 100% (09-23)  GENERAL: No acute distress, well-developed  HEAD:  Atraumatic, Normocephalic  ENT: EOMI, PERRLA, conjunctiva and sclera clear, Neck supple, No JVD, moist mucosa  CHEST/LUNG: Clear to auscultation bilaterally; No wheeze, equal breath sounds bilaterally   BACK: No spinal tenderness  HEART: Regular rate and rhythm; No murmurs, rubs, or gallops  ABDOMEN: Soft, Nontender, Nondistended; Bowel sounds present  EXTREMITIES:  No clubbing, cyanosis, or edema  PSYCH: Nl behavior, nl affect  NEUROLOGY: AAOx3, non-focal, cranial nerves intact  SKIN: Normal color, No rashes or lesions  LINES:    Cardiovascular Diagnostic Testing:    CXR: Personally reviewed    Labs: Personally reviewed                   Patient seen and evaluated at bedside    Reason for consult: C/F ACS    Updates:   Alerted by surgery provider that there is concern for an acute ischemic event in the OR during surgery requiring escalating pressor requirements. Immediately, responded to bedside went through the patient cardiac history while awaiting patient to return from OR with OR providers.     Patient returned from the OR with anesthesia reporting escalation of pressors with un-responsive BP and new ST elevations on monitor in the anterior/anterior septal leads. Patient has a significant cardiac history listed below.     By the time the patient returned to the CTU he had weaned off pressors. EKG performed at bedside shows resolution of ST segment elevations previously identified by Anesthesia. Patient currently intubated and sedated.       5/11/22  Diagnostic Conclusions:     RCA  and significant LAD and diagonal lesions with LILLIANA 3 flow.  IABP placed for elevated right sided pressures.  Consider  PCI after medical management and possible viability     Procedure Narrative:   The risks and alternatives of the procedures and conscious sedation  were explained to the patient and informed consent was  obtained. The patient was brought to the cath lab and placed on the  exam table.  Access   Right femoral artery:   The puncture site was infiltrated with 1% Lidocaine. Vascular access  was obtained using modified seldinger technique.  Right femoral vein:   The puncture site was infiltrated with 1% Lidocaine. Vascular access  was obtained using modified seldinger technique.    Intra-aortic Balloon Pump   An intra-aortic balloon pump was inserted.      Diagnostic Findings:     Coronary Angiography   The coronary circulation is right dominant.      LM   Left main artery: Angiography shows no disease.      LAD   Proximal left anterior descending: There is a 70 % stenosis. First  diagonal: There is a 70 % stenosis.    Patient: ELYSE MALAVE            MRN: [id number]  Study Date: 05/11/2022   03:16 PM      Page 1 of 4          CX   Circumflex: Angiography shows no disease.      RCA   Mid right coronary artery: There is a 100 % stenosis.          HPI:  77 yr old male presents with hx of bipolar, BPH, SCC of leg, CHF EF 25% severe segmental LV systolic dysfunction, CAD, hospitalized on May 2022 for acute MI s/p cardiac Cath with no intervention (revealed  RCA and 70% occlusion prox LAD, 1st diagonal 70% stenosis- medical management-no intervention, IABP used for elevated right sided pressures; cardiogenic shock, found to have new 1x 1.3cm LV thrombus on Plavix and Coumadin (Coumadin completed on 08/26/2022, Plavix stopped around same time per pt), c/o one episode of difficulty walking, chest discomfort  and SOB while in the pool, CXR showed RUL nodule, PET/CT showed FDG avid RUL mass    PET/CT on 07/28/2022:  - Mildly FDG avid mixed solid and groundglass right upper lung mass measuring 3.5 x 2 cm with SUV 4.8 (image 82). A 0.3 cm left upper lung nodule (image 75) is too small to characterize.  - Small bilateral pleural effusion not significantly changed from comparison CT with minimal FDG avidity with SUV 2.2.   - Large photopenic 8.4 x 7.7 cm low-attenuation mass along the inferoposterior aspect of the right kidney, compatible with cyst.  - Nonspecific mildly FDG avid low-attenuation left adrenal nodule, possibly an adenoma. This may be further evaluated with contrast-enhanced CT or MRI.    Patient reports stopping coumadin 2 weeks ago, echo 8/25/22 in chart    Scheduled for RUL mass biopsy on 9/14/22 and followed by right VATs, right upper lobectomy 9/22/22  Patient denies any claudication, chest pain or pressure, SOB, walked for 30 minutes yesterday without s/s (06 Sep 2022 10:56)      PMHx:   BPH (benign prostatic hyperplasia)    Bipolar disorder    Depression    Anxiety    Umbilical hernia, incarcerated    Inguinal hernia of right side without obstruction or gangrene    Depression    Constipation    Urinary retention    CAD (coronary artery disease)    SCC (squamous cell carcinoma)    Lung mass    Other nonspecific abnormal finding of lung field    LV (left ventricular) mural thrombus    History of inguinal hernia    Severe left ventricular systolic dysfunction    History of CHF (congestive heart failure)        PSHx:   Anxiety    Depression    BPH (benign prostatic hyperplasia)    History of arthroscopy of knee    History of tonsillectomy    History of hernia repair    H/O coronary angiogram        Allergies:  No Known Allergies      Home Meds:    Current Medications:   ALBUTerol    0.083% 2.5 milliGRAM(s) Nebulizer every 6 hours PRN  chlorhexidine 0.12% Liquid 15 milliLiter(s) Oral Mucosa every 12 hours  chlorhexidine 4% Liquid 1 Application(s) Topical <User Schedule>  dexMEDEtomidine Infusion 0.7 MICROgram(s)/kG/Hr IV Continuous <Continuous>  DOBUTamine Infusion 1.033 MICROgram(s)/kG/Min IV Continuous <Continuous>  heparin   Injectable 5000 Unit(s) SubCutaneous every 8 hours  ipratropium    for Nebulization 500 MICROGram(s) Nebulizer every 6 hours PRN  senna 2 Tablet(s) Oral at bedtime  sodium chloride 0.9% lock flush 3 milliLiter(s) IV Push every 8 hours  sodium chloride 0.9% lock flush 10 milliLiter(s) IV Push every 1 hour PRN      FAMILY HISTORY:  Family history of depression (Mother)    FH: CAD (coronary artery disease) (Sibling, Sibling)    Family history of diabetes mellitus (DM) (Sibling)        Social History:  Smoking History:  Alcohol Use:  Drug Use:    Review of Systems:    [ ] Unable to assess ROS due to sedation/intubation    Physical Exam:  T(F): 98.1 (09-23), Max: 98.1 (09-23)  HR: 78 (09-23) (78 - 83)  BP: 100/69 (09-23) (100/69 - 100/69)  RR: 16 (09-23)  SpO2: 100% (09-23)    CXR: Personally reviewed    Labs: Personally reviewed

## 2022-09-23 NOTE — BRIEF OPERATIVE NOTE - OPERATION/FINDINGS
s/p FB, R VATS. Unable to complete RUL Wedge Resection due to tissue density, R Upper Lobectomy performed

## 2022-09-23 NOTE — PATIENT PROFILE ADULT - FALL HARM RISK - HARM RISK INTERVENTIONS

## 2022-09-23 NOTE — CONSULT NOTE ADULT - ASSESSMENT
77 yr old male presents with hx of bipolar, BPH, SCC of leg, CHF EF 25% severe segmental LV systolic dysfunction, CAD, hospitalized on May 2022 for acute MI s/p cardiac Cath with no intervention (revealed  RCA and 70% occlusion prox LAD, 1st diagonal 70% stenosis  Right VATs, right upper lobectomy 22  Hypotension sp IVF and sp pressors.  Hypotension from cardiogenic shock leading to BRODY   Metabolic acidosis   SP Hyperkalemia   Respiratory failure/ embarrassment leading to mechanical intubation     1 Renal- BRODY is from cariogenic shock at present and he is on  gtt.  Urine output was trended and now stable   Would like to keep CVP around 15 at present   MAP about 60 and now off pressors   Creatinine will increase in am as well   2 CVS-Severe global reduction in EF and on inotropes;  No need for swan at present   SP IVF earlier and no need for now   Diuretics as needed tonight but would give tomorrow when CPAP  3 Pulm-Leave intubated tonight and trial of extubation in am-CPAP    Critically sick but stable   DW Wife  DW CTS     [facility]   [facility]   [phone]

## 2022-09-23 NOTE — CONSULT NOTE ADULT - ASSESSMENT
Mr. Becerra is a 77yoM presenting to the [facility] for lung surgery for whom we are consulted to evaluate for acute ischemic event.     #C/F Acute ischemic event.   Patient with 100% RCA , 70% D1, 70% LAD lesion noted on Cath from May 2022 when her presented for a STEMI which was managed medically after viability testing indicated mostly infarcted tissue. He has HFrEF ischemic etiology with severe Bi-ventricular failure at baseline.   Review of the EKG baseline and currently at bedside show significant q-waves throughout the anterio-anteriolateral leads.     It is likely that the ST segments changes were related to ischemia 2/2 hypotension and not an acute new coronary blockage. This patient has severe bi-ventricular failure Mr. Becerra is a 77yoM [facility] for whom we are consulted to evaluate for acute ischemic event.     #C/F Acute ischemic event.   Patient with 100% RCA , 70% D1, 70% LAD lesion noted on Cath from May 2022 when her presented for a STEMI which was managed medically after viability testing indicated mostly infarcted tissue. He has HFrEF ischemic etiology with severe Bi-ventricular failure at baseline.   Review of the EKG baseline and currently at bedside show significant q-waves throughout the anterio-anteriolateral leads.     It is likely that the ST segments changes were related to ischemia 2/2 hypotension and not an acute new coronary blockage. This patient has severe bi-ventricular failure making him high risk for poorly tolerating vasopressors and large volume shifts.     Recommendations:  -Standard post-op care per surgical team  -Would avoid use of propofol for sedation given risk of negative inotropy and hypotension  -Recommend goal CVP of ~10 for volume resuscitation given his severe biventricular failure.   -Would not check troponin, will likely be elevated iso hypotension and will not  at this time  -Monitor U/O, c/f patient will develop ATN iso hypotension  -Tele  -Strict I&O  -Strict lytes: K:4, M    Note incomplete until attending cosign.

## 2022-09-23 NOTE — INPATIENT CERTIFICATION FOR MEDICARE PATIENTS - THE STATUS OF COMORBIDITIES.
2. The status of comorbities. (See ED/admit documents) VITAL SIGNS: I have reviewed nursing notes and confirm.  CONSTITUTIONAL:  in no acute distress.  SKIN: Skin exam is warm and dry, diffuse erythematous macular rash forehead and left cheek,   HEAD: Normocephalic; atraumatic.  EYES: PERRL, EOM intact; conjunctiva and sclera clear.  ENT: No nasal discharge; airway clear. Throat clear.  NECK: Supple; non tender.    CARD: Regular rate and rhythm.  RESP: No wheezes,  no rales or rhonchi.   ABD:  soft; non-distended; non-tender;

## 2022-09-23 NOTE — PROGRESS NOTE ADULT - SUBJECTIVE AND OBJECTIVE BOX
CHIEF COMPLAINT: FOLLOW UP IN ICU FOR POSTOPERATIVE CARE OF PATIENT WHO IS S/P  RUL lobectomy, cardiogenic shock            PROCEDURES:       FB, R VATS, R Upper Lobectomy. 23-Sep-2022         ISSUES:       Cardiogenic shock   Acute postop respiratory failure   CAD, biventricular failure, h/o LV thrombus   Hyperkalemia   Lactic acidosis, acidemic   Encephalopathy, likely cerebral hypoperfusion in shock state, improving   BRODY likely ATN   Lung nodule   Postop pain   Chest tube in place            INTERVAL EVENTS:         Reported to have hypotension in OR refractory phenylephrine pushes/drip, given 1.5 liters fluid, epi pushes, 1 to 2 unit vasopressin   Patient has h/o CAD with 100% RCA and 70% LAD from cath in 05/2022, nonviable myocardium, was being medically managed with dual antiplatelet and couamdin for LV thrombus      30ml urine output intraop     Brought back to ICU intubated, off pressors on arrival in ICU. Cardiology team was at bedside. EKG without acute ischemic changes, Q waves in anterolateral leads. First set of trop negative.   POCUS on arrival with LVEF<20%, inferior and inferolateral walls with some contractility however apex, septum akinetic, RV dilated and hypokinetic, IVC 2cm, CVP 17   Patient was started on dobutamine @3.5mcg/kg/min, repeat CVP 6, given additional 750ml fluids ICU, medical management for hyperkalemia/acidemia.   Urine output and mental status improving, initially dark and oliguric, improving to 60ml/h         HISTORY:      Intubated, unable to obtain         PHYSICAL EXAM:      Gen: Comfortable, No acute distress     Eyes: Sclera white, Conjunctiva normal, Eyelids normal, Pupils symmetrical      ENT: Mucous membranes moist,  ,  ,       Neck: Trachea midline,  ,  ,  ,  ,  ,       CV: Rate regular, Rhythm regular,  ,  ,       Resp: Breath sounds clear, No accessory muscles use, R chest tube in place,  ,       Abd: Soft, Non-distended, Non-tender, Bowel sounds normal,  ,  ,       Skin: Warm, No peripheral edema of lower extremities,  ,       :  gerardo     Neuro: Moving all 4 extremities,       Psych: A&Ox3               ASSESSMENT AND PLAN:           NEURO:     Neurochecks, encephalopathic initially for 3-4h postop - improving mental status, following commands, moving all ext   Precedex as needed for sedation to keep RASS 0 to -1              RESPIRATORY:     Acute postop respiratory failure - mechanical ventilation AC/VC - 450/22/5/40%. Kept intubated postop given cardiogenic shock and encephalopathy likely in setting of hypoperfusion  Mental status improving, SBT in AM if hemodynamics and mental status continue to improve  Bronchodilators as needed, Check CXR, vent bundle       Chest tube – Pleurevac regulated suctioning. Monitor chest tube output.                  CARDIOVASCULAR:     Hemodynamically unstable, cardiogenic shock, myocardial demand ischemia intraop when hypotensive with reported ST elevations - MAP maintained with dobutamine@3,5mcg/kg/min, given 1.5 liters intraop and an additional 750ml in ICU                                                                                                                                                                                                   Noted to have EF<20% when first brought to ICU postop, biventricular failure at baseline.                                                                                                                                                                                                    LV and RV contractility improved on POCUS with ongoing dobutamine, compared to stunned severe decreased BiV function immediate postop    Telemetry (medical test) - Reviewed by me today independently. Normal sinus rhythm.     Cardiology consult recs appreciated                 RENAL:     BRODY, acidemic, lactic acidosis, hyperkalemia, hyponatremia, hyperphosphatemia - Urine output improving with dobutamine and IVF. Given 5% albumin 500ml, LR 250ml.  CVP was 6.  Trend BMP, ABG, hourly urine output    dark and oliguric on arrival, improving to ~60ml/h after resuscitation                 GASTROINTESTINAL:     GI prophylaxis     Zofran and Reglan IV PRN for nausea     NPO      Transaminitis likely in setting of shock, trend and reassess        HEMATOLOGIC:     No signs of active bleeding. Monitor Hgb in CBC in AM     DVT prophylaxis with heparin subQ and SCDs.             INFECTIOUS DISEASE:     All surgical sites appear clean. No signs of active infection. Will monitor for fever and leukocytosis.           ENDOCRINE:     Stable – Monitor glucose fingersticks for goal 120-180.        ONCOLOGY:     Lung nodule - Improved. S/P resection. Follow up final pathology.         Pertinent clinical, laboratory, radiographic, hemodynamic, echocardiographic, respiratory data, microbiologic data and chart were reviewed by myself and analyzed frequently throughout the course of the day and night by myself.     Plan discussed at length with the CTICU staff and Attending CT Surgeon -   Dr Nick Conrad    Patient's status was discussed with patient's wife at bedside.    Critical care time spent 80 mins.       ________________________________________________     _________________________  VITAL SIGNS:  Vital Signs Last 24 Hrs  T(C): 36.8 (23 Sep 2022 14:00), Max: 36.8 (23 Sep 2022 14:00)  T(F): 98.3 (23 Sep 2022 14:00), Max: 98.3 (23 Sep 2022 14:00)  HR: 80 (23 Sep 2022 16:30) (71 - 87)  BP: 119/71 (23 Sep 2022 16:00) (97/67 - 131/89)  BP(mean): 85 (23 Sep 2022 16:00) (77 - 105)  RR: 20 (23 Sep 2022 16:30) (0 - 25)  SpO2: 99% (23 Sep 2022 16:30) (97% - 100%)    Parameters below as of 23 Sep 2022 14:00  Patient On (Oxygen Delivery Method): ventilator      I/Os:   I&O's Detail    22 Sep 2022 07:01  -  23 Sep 2022 07:00  --------------------------------------------------------  IN:    Lactated Ringers: 30 mL  Total IN: 30 mL    OUT:  Total OUT: 0 mL    Total NET: 30 mL      23 Sep 2022 07:01  -  23 Sep 2022 17:04  --------------------------------------------------------  IN:    Dexmedetomidine: 10.1 mL    DOBUTamine: 25.4 mL    IV PiggyBack: 100 mL    Lactated Ringers Bolus: 250 mL  Total IN: 385.5 mL    OUT:    Chest Tube (mL): 45 mL    Indwelling Catheter - Urethral (mL): 140 mL  Total OUT: 185 mL    Total NET: 200.5 mL          Mode: AC/ CMV (Assist Control/ Continuous Mandatory Ventilation)  RR (machine): 22  TV (machine): 450  FiO2: 40  PEEP: 5  MAP: 8  PIP: 18      MEDICATIONS:  MEDICATIONS  (STANDING):  albumin human  5% IVPB 250 milliLiter(s) IV Intermittent once  chlorhexidine 0.12% Liquid 15 milliLiter(s) Oral Mucosa every 12 hours  chlorhexidine 4% Liquid 1 Application(s) Topical <User Schedule>  dexMEDEtomidine Infusion 0.7 MICROgram(s)/kG/Hr (14.1 mL/Hr) IV Continuous <Continuous>  DOBUTamine Infusion 1.033 MICROgram(s)/kG/Min (2.5 mL/Hr) IV Continuous <Continuous>  heparin   Injectable 5000 Unit(s) SubCutaneous every 8 hours  insulin lispro (ADMELOG) corrective regimen sliding scale   SubCutaneous every 6 hours  senna 2 Tablet(s) Oral at bedtime  sodium chloride 0.9% lock flush 3 milliLiter(s) IV Push every 8 hours    MEDICATIONS  (PRN):  ALBUTerol    0.083% 2.5 milliGRAM(s) Nebulizer every 6 hours PRN Shortness of Breath and/or Wheezing  ipratropium    for Nebulization 500 MICROGram(s) Nebulizer every 6 hours PRN Shortness of Breath and/or Wheezing  sodium chloride 0.9% lock flush 10 milliLiter(s) IV Push every 1 hour PRN Pre/post blood products, medications, blood draw, and to maintain line patency      LABS:  Laboratory data was independently reviewed by me today.                           12.9   13.65 )-----------( 265      ( 23 Sep 2022 13:40 )             39.8     09-23    125<L>  |  94<L>  |  46<H>  ----------------------------<  165<H>  6.6<HH>   |  14<L>  |  1.61<H>    Ca    8.3<L>      23 Sep 2022 13:40  Phos  7.6     09-23  Mg     2.50     09-23    TPro  6.8  /  Alb  3.5  /  TBili  2.8<H>  /  DBili  x   /  AST  202<H>  /  ALT  142<H>  /  AlkPhos  166<H>  09-23    LIVER FUNCTIONS - ( 23 Sep 2022 13:40 )  Alb: 3.5 g/dL / Pro: 6.8 g/dL / ALK PHOS: 166 U/L / ALT: 142 U/L / AST: 202 U/L / GGT: x           PT/INR - ( 23 Sep 2022 13:40 )   PT: 17.6 sec;   INR: 1.51 ratio         PTT - ( 23 Sep 2022 13:40 )  PTT:25.6 sec  ABG - ( 23 Sep 2022 16:05 )  pH, Arterial: 7.24  pH, Blood: x     /  pCO2: 41    /  pO2: 175   / HCO3: 18    / Base Excess: -9.3  /  SaO2: 98.3                  RADIOLOGY:   Radiology images were independently reviewed by me today. Reports were reviewed by me today.    Xray Chest 1 View- PORTABLE-Urgent:   ACC: [id number] EXAM:  XR CHEST PORTABLE URGENT 1V                          PROCEDURE DATE:  09/23/2022          INTERPRETATION:  CLINICAL INFORMATION: Postop    TIME OF EXAMINATION: September 23, 2022 at 3:10 PM    EXAM: Portable chest    FINDINGS:  Right upper lobe mass has been surgically removed. Right-sided chest tube   in place with loss of volume in this lung but no consolidation, effusion   or pneumothorax. Left lung is clear. Right mediastinal widening.        COMPARISON: August 25        IMPRESSION: Status post right thoracotomy with chest tube.    --- End of Report ---            SARA KIMBLE MD; Attending Radiologist  This document has been electronically signed. Sep 23 2022  3:49PM (09-23-22 @ 15:22)  Xray Chest 1 View AP/PA:   ACC: [id number] EXAM:  XR CHEST AP OR PA 1V                          PROCEDURE DATE:  09/14/2022          INTERPRETATION:  CLINICAL INFORMATION: Right lung mass    TIME OF EXAMINATION: September 14 at 2:13 PM and 2:14 PM    EXAM: Portable chest    FINDINGS:  Tiny pneumothorax suggested on the radiograph obtained at 2:14 PM.  Right   upper lobe opacity is present. Remainder of the lungs are clear. Heart   size is stable. No pleural effusion.        COMPARISON: August 29, 2022        IMPRESSION: Possible tiny pneumothorax post lung biopsy.    --- End of Report ---            SARA KIMBLE MD; Attending Radiologist  This document has been electronically signed. Sep 14 2022  6:29PM (09-14-22 @ 14:23)  Xray Chest 2 Views PA/Lat:   ACC: [id number] EXAM:  XR CHEST PA LAT 2V                          PROCEDURE DATE:  08/29/2022          INTERPRETATION:  CLINICAL INFORMATION: Pulmonary nodule    TIME OF EXAMINATION: August 29, 2022 at 1:57 PM    EXAM: PA and lateral chest    FINDINGS:  The faint opacity in the right upper lobe is unchanged from the study of   4 days ago. Remainder of the lungs are clear, the heart is not enlarged   and there is no effusion or pneumothorax.        COMPARISON: August 25        IMPRESSION: Follow-up stable poorly defined right upper lobe density.    --- End of Report ---            SARA KIMBLE MD; Attending Radiologist  This document has been electronically signed. Aug 30 2022  5:59PM (08-29-22 @ 12:53)                       CHIEF COMPLAINT: FOLLOW UP IN ICU FOR POSTOPERATIVE CARE OF PATIENT WHO IS S/P  RUL lobectomy, cardiogenic shock            PROCEDURES:       FB, R VATS, R Upper Lobectomy. 23-Sep-2022         ISSUES:       Cardiogenic shock   Acute postop respiratory failure   CAD, biventricular failure, h/o LV thrombus   Hyperkalemia   Lactic acidosis, acidemic   Encephalopathy, likely cerebral hypoperfusion in shock state, improving   BRODY likely ATN   Lung nodule   Postop pain   Chest tube in place            INTERVAL EVENTS:         Reported to have hypotension in OR refractory phenylephrine pushes/drip, given 1.5 liters fluid, epi pushes, 1 to 2 unit vasopressin   Patient has h/o CAD with 100% RCA and 70% LAD from cath in 05/2022, nonviable myocardium, was being medically managed with dual antiplatelet and couamdin for LV thrombus      30ml urine output intraop     Brought back to ICU intubated, off pressors on arrival in ICU. Cardiology team was at bedside. EKG without acute ischemic changes, Q waves in anterolateral leads. First set of trop negative.   POCUS on arrival with LVEF<20%, inferior and inferolateral walls with some contractility however apex, septum akinetic, RV dilated and hypokinetic, IVC 2cm, CVP 17   Patient was started on dobutamine @3.5mcg/kg/min, repeat CVP 6, given additional 750ml fluids ICU, medical management for hyperkalemia/acidemia.   Urine output and mental status improving, initially dark and oliguric, improving to 60ml/h         HISTORY:      Intubated, unable to obtain         PHYSICAL EXAM:      Gen: Comfortable, No acute distress     Eyes: Sclera white, Conjunctiva normal, Eyelids normal, Pupils symmetrical      ENT: Mucous membranes moist,  ,  ,       Neck: Trachea midline,  ,  ,  ,  ,  ,       CV: Rate regular, Rhythm regular,  ,  ,       Resp: Breath sounds clear, No accessory muscles use, R chest tube in place,  ,       Abd: Soft, Non-distended, Non-tender, Bowel sounds normal,  ,  ,       Skin: Warm, No peripheral edema of lower extremities,  ,       :  gerardo     Neuro: Moving all 4 extremities,       Psych: A&Ox3               ASSESSMENT AND PLAN:           NEURO:     Neurochecks, encephalopathic initially for 3-4h postop - improving mental status, following commands, moving all ext   Precedex as needed for sedation to keep RASS 0 to -1              RESPIRATORY:     Acute postop respiratory failure - mechanical ventilation AC/VC - 450/22/5/40%. Kept intubated postop given cardiogenic shock and encephalopathy likely in setting of hypoperfusion  Mental status improving, SBT in AM if hemodynamics and mental status continue to improve  Bronchodilators as needed, Check CXR, vent bundle       Chest tube – Pleurevac regulated suctioning. Monitor chest tube output.                  CARDIOVASCULAR:     Hemodynamically unstable, cardiogenic shock, myocardial demand ischemia intraop when hypotensive with reported ST elevations - MAP maintained with dobutamine@3,5mcg/kg/min, given 1.5 liters intraop and an additional 750ml in ICU                                                                                                                                                                                                   Noted to have EF<20% when first brought to ICU postop, biventricular failure at baseline.                                                                                                                                                                                                    LV and RV contractility improved on POCUS with ongoing dobutamine, compared to stunned severe decreased BiV function immediate postop    Telemetry (medical test) - Reviewed by me today independently. Normal sinus rhythm.     Cardiology consult recs appreciated                 RENAL:     BRODY, acidemic, lactic acidosis, hyperkalemia, hyponatremia, hyperphosphatemia - Urine output improving with dobutamine and IVF. Given 5% albumin 500ml, LR 250ml.  CVP was 6.  Trend BMP, ABG, hourly urine output    dark and oliguric on arrival, improving to ~60ml/h after resuscitation                 GASTROINTESTINAL:     GI prophylaxis     Zofran and Reglan IV PRN for nausea     NPO      Transaminitis likely in setting of shock, trend and reassess        HEMATOLOGIC:     No signs of active bleeding. Monitor Hgb in CBC in AM     DVT prophylaxis with heparin subQ and SCDs.             INFECTIOUS DISEASE:     All surgical sites appear clean. No signs of active infection. Will monitor for fever and leukocytosis.           ENDOCRINE:     Stable – Monitor glucose fingersticks for goal 120-180.        ONCOLOGY:     Lung nodule - Improved. S/P resection. Follow up final pathology.         Pertinent clinical, laboratory, radiographic, hemodynamic, echocardiographic, respiratory data, microbiologic data and chart were reviewed by myself and analyzed frequently throughout the course of the day and night by myself.     Plan discussed at length with the CTICU staff and Attending CT Surgeon -   Dr Stevie Padilla    Patient's status was discussed with patient's wife at bedside.    Critical care time spent 80 mins.       ________________________________________________     _________________________  VITAL SIGNS:  Vital Signs Last 24 Hrs  T(C): 36.8 (23 Sep 2022 14:00), Max: 36.8 (23 Sep 2022 14:00)  T(F): 98.3 (23 Sep 2022 14:00), Max: 98.3 (23 Sep 2022 14:00)  HR: 80 (23 Sep 2022 16:30) (71 - 87)  BP: 119/71 (23 Sep 2022 16:00) (97/67 - 131/89)  BP(mean): 85 (23 Sep 2022 16:00) (77 - 105)  RR: 20 (23 Sep 2022 16:30) (0 - 25)  SpO2: 99% (23 Sep 2022 16:30) (97% - 100%)    Parameters below as of 23 Sep 2022 14:00  Patient On (Oxygen Delivery Method): ventilator      I/Os:   I&O's Detail    22 Sep 2022 07:01  -  23 Sep 2022 07:00  --------------------------------------------------------  IN:    Lactated Ringers: 30 mL  Total IN: 30 mL    OUT:  Total OUT: 0 mL    Total NET: 30 mL      23 Sep 2022 07:01  -  23 Sep 2022 17:04  --------------------------------------------------------  IN:    Dexmedetomidine: 10.1 mL    DOBUTamine: 25.4 mL    IV PiggyBack: 100 mL    Lactated Ringers Bolus: 250 mL  Total IN: 385.5 mL    OUT:    Chest Tube (mL): 45 mL    Indwelling Catheter - Urethral (mL): 140 mL  Total OUT: 185 mL    Total NET: 200.5 mL          Mode: AC/ CMV (Assist Control/ Continuous Mandatory Ventilation)  RR (machine): 22  TV (machine): 450  FiO2: 40  PEEP: 5  MAP: 8  PIP: 18      MEDICATIONS:  MEDICATIONS  (STANDING):  albumin human  5% IVPB 250 milliLiter(s) IV Intermittent once  chlorhexidine 0.12% Liquid 15 milliLiter(s) Oral Mucosa every 12 hours  chlorhexidine 4% Liquid 1 Application(s) Topical <User Schedule>  dexMEDEtomidine Infusion 0.7 MICROgram(s)/kG/Hr (14.1 mL/Hr) IV Continuous <Continuous>  DOBUTamine Infusion 1.033 MICROgram(s)/kG/Min (2.5 mL/Hr) IV Continuous <Continuous>  heparin   Injectable 5000 Unit(s) SubCutaneous every 8 hours  insulin lispro (ADMELOG) corrective regimen sliding scale   SubCutaneous every 6 hours  senna 2 Tablet(s) Oral at bedtime  sodium chloride 0.9% lock flush 3 milliLiter(s) IV Push every 8 hours    MEDICATIONS  (PRN):  ALBUTerol    0.083% 2.5 milliGRAM(s) Nebulizer every 6 hours PRN Shortness of Breath and/or Wheezing  ipratropium    for Nebulization 500 MICROGram(s) Nebulizer every 6 hours PRN Shortness of Breath and/or Wheezing  sodium chloride 0.9% lock flush 10 milliLiter(s) IV Push every 1 hour PRN Pre/post blood products, medications, blood draw, and to maintain line patency      LABS:  Laboratory data was independently reviewed by me today.                           12.9   13.65 )-----------( 265      ( 23 Sep 2022 13:40 )             39.8     09-23    125<L>  |  94<L>  |  46<H>  ----------------------------<  165<H>  6.6<HH>   |  14<L>  |  1.61<H>    Ca    8.3<L>      23 Sep 2022 13:40  Phos  7.6     09-23  Mg     2.50     09-23    TPro  6.8  /  Alb  3.5  /  TBili  2.8<H>  /  DBili  x   /  AST  202<H>  /  ALT  142<H>  /  AlkPhos  166<H>  09-23    LIVER FUNCTIONS - ( 23 Sep 2022 13:40 )  Alb: 3.5 g/dL / Pro: 6.8 g/dL / ALK PHOS: 166 U/L / ALT: 142 U/L / AST: 202 U/L / GGT: x           PT/INR - ( 23 Sep 2022 13:40 )   PT: 17.6 sec;   INR: 1.51 ratio         PTT - ( 23 Sep 2022 13:40 )  PTT:25.6 sec  ABG - ( 23 Sep 2022 16:05 )  pH, Arterial: 7.24  pH, Blood: x     /  pCO2: 41    /  pO2: 175   / HCO3: 18    / Base Excess: -9.3  /  SaO2: 98.3                  RADIOLOGY:   Radiology images were independently reviewed by me today. Reports were reviewed by me today.    Xray Chest 1 View- PORTABLE-Urgent:   ACC: [id number] EXAM:  XR CHEST PORTABLE URGENT 1V                          PROCEDURE DATE:  09/23/2022          INTERPRETATION:  CLINICAL INFORMATION: Postop    TIME OF EXAMINATION: September 23, 2022 at 3:10 PM    EXAM: Portable chest    FINDINGS:  Right upper lobe mass has been surgically removed. Right-sided chest tube   in place with loss of volume in this lung but no consolidation, effusion   or pneumothorax. Left lung is clear. Right mediastinal widening.        COMPARISON: August 25        IMPRESSION: Status post right thoracotomy with chest tube.    --- End of Report ---            SARA KIMBLE MD; Attending Radiologist  This document has been electronically signed. Sep 23 2022  3:49PM (09-23-22 @ 15:22)  Xray Chest 1 View AP/PA:   ACC: [id number] EXAM:  XR CHEST AP OR PA 1V                          PROCEDURE DATE:  09/14/2022          INTERPRETATION:  CLINICAL INFORMATION: Right lung mass    TIME OF EXAMINATION: September 14 at 2:13 PM and 2:14 PM    EXAM: Portable chest    FINDINGS:  Tiny pneumothorax suggested on the radiograph obtained at 2:14 PM.  Right   upper lobe opacity is present. Remainder of the lungs are clear. Heart   size is stable. No pleural effusion.        COMPARISON: August 29, 2022        IMPRESSION: Possible tiny pneumothorax post lung biopsy.    --- End of Report ---            SARA KIMBLE MD; Attending Radiologist  This document has been electronically signed. Sep 14 2022  6:29PM (09-14-22 @ 14:23)  Xray Chest 2 Views PA/Lat:   ACC: [id number] EXAM:  XR CHEST PA LAT 2V                          PROCEDURE DATE:  08/29/2022          INTERPRETATION:  CLINICAL INFORMATION: Pulmonary nodule    TIME OF EXAMINATION: August 29, 2022 at 1:57 PM    EXAM: PA and lateral chest    FINDINGS:  The faint opacity in the right upper lobe is unchanged from the study of   4 days ago. Remainder of the lungs are clear, the heart is not enlarged   and there is no effusion or pneumothorax.        COMPARISON: August 25        IMPRESSION: Follow-up stable poorly defined right upper lobe density.    --- End of Report ---            SARA KIMBLE MD; Attending Radiologist  This document has been electronically signed. Aug 30 2022  5:59PM (08-29-22 @ 12:53)

## 2022-09-23 NOTE — ASU PREOP CHECKLIST - LAST TOOK
less pain and improved function Discharge Instructions Total Hip Arthroplasty    1. Diet: Resume previous diet  2. Activity: WBAT. Rolling walker. Posterior Hip Dislocation Precautions. Abduction Pillow while in bed and Chair. Daily Physical Therapy.  3. Call with: fever over 101, wound redness, drainage or open area, calf pain/calf swelling.  4. Wound Care: Remove old and Place new Aquacel bandage to hip wound every 7days.   5. RN to Remove Staples Post Op Day #14 (2/22/18) so long as wound is healed, no drainage or open area.   6. OK to Shower with Aquacel. Must be an Aquacel. Avoid direct water beating on bandage.   7. DVT PE Prophylaxis: Managed by Anticoag Team. See Anticoagulation Instructions. See Med Rec.  8.  Continue Protonix daily while on Anticoagulant. An eRx has been sent to your pharmacy.  9. Labs: Check H&H weekly while on Anticoagulation. Check INR while on Coumadin.  10.  Follow Up: Dr. Jacinto in 1 month. Call to schedule.   11. Pain Medication: eRX sent to your pharmacy for  if you go home. clears

## 2022-09-23 NOTE — CONSULT NOTE ADULT - ATTENDING COMMENTS
Pt with hypotension and increasing ST depressions on cardiac monitor concerning for ACS. decreased  UOP  Pt initially started on aaron with minimal improvement, then levophed, then epinephrine with improvement.    post-op ECG with no significant ST elevations.  bedside TTE without pericardial effusion.    Upon review pt with CAD determined to be nonviable except for the lateral distribution; LCx without significant disease. TTE with biventricular failure.    Not ACS. troponin would be nondiagnostic.  suspect hemodynamically mediated ST changes and global cardiac ischemia.  given biventricular failure would avoid further pure vasopressors and negative inotropes. should pt require hemodynamic support favor dobutamine or milrinone, though suspect this will only be temporary.

## 2022-09-23 NOTE — CONSULT NOTE ADULT - SUBJECTIVE AND OBJECTIVE BOX
NEPHROLOGY - NSN    Patient seen and examined.    HPI:  77 yr old male presents with hx of bipolar, BPH, SCC of leg, CHF EF 25% severe segmental LV systolic dysfunction, CAD, hospitalized on May 2022 for acute MI s/p cardiac Cath with no intervention (revealed  RCA and 70% occlusion prox LAD, 1st diagonal 70% stenosis- medical management-no intervention, IABP used for elevated right sided pressures; cardiogenic shock, found to have new 1x 1.3cm LV thrombus on Plavix and Coumadin (Coumadin completed on 08/26/2022, Plavix stopped around same time per pt), c/o one episode of difficulty walking, chest discomfort  and SOB while in the pool, CXR showed RUL nodule, PET/CT showed FDG avid RUL mass    PET/CT on 07/28/2022:  - Mildly FDG avid mixed solid and groundglass right upper lung mass measuring 3.5 x 2 cm with SUV 4.8 (image 82). A 0.3 cm left upper lung nodule (image 75) is too small to characterize.  - Small bilateral pleural effusion not significantly changed from comparison CT with minimal FDG avidity with SUV 2.2.   - Large photopenic 8.4 x 7.7 cm low-attenuation mass along the inferoposterior aspect of the right kidney, compatible with cyst.  - Nonspecific mildly FDG avid low-attenuation left adrenal nodule, possibly an adenoma. This may be further evaluated with contrast-enhanced CT or MRI.    Patient reports stopping coumadin 2 weeks ago, echo 8/25/22 in chart    Scheduled for RUL mass biopsy on 9/14/22 and followed by right VATs, right upper lobectomy 9/22/22  Patient denies any claudication, chest pain or pressure, SOB, walked for 30 minutes yesterday without s/s (06 Sep 2022 10:56)      PAST MEDICAL & SURGICAL HISTORY:  BPH (benign prostatic hyperplasia)      Bipolar disorder      Depression      Anxiety      Umbilical hernia, incarcerated      Inguinal hernia of right side without obstruction or gangrene      Depression      Constipation      Urinary retention      CAD (coronary artery disease)      SCC (squamous cell carcinoma)      Lung mass      Other nonspecific abnormal finding of lung field      LV (left ventricular) mural thrombus      History of inguinal hernia      Severe left ventricular systolic dysfunction      History of CHF (congestive heart failure)      History of arthroscopy of knee  Right, 1987      History of tonsillectomy  1952      History of hernia repair      H/O coronary angiogram          MEDICATIONS  (STANDING):  albumin human  5% IVPB 250 milliLiter(s) IV Intermittent once  chlorhexidine 0.12% Liquid 15 milliLiter(s) Oral Mucosa every 12 hours  chlorhexidine 4% Liquid 1 Application(s) Topical <User Schedule>  dexMEDEtomidine Infusion 0.7 MICROgram(s)/kG/Hr (14.1 mL/Hr) IV Continuous <Continuous>  DOBUTamine Infusion 1.033 MICROgram(s)/kG/Min (2.5 mL/Hr) IV Continuous <Continuous>  heparin   Injectable 5000 Unit(s) SubCutaneous every 8 hours  insulin lispro (ADMELOG) corrective regimen sliding scale   SubCutaneous every 6 hours  senna 2 Tablet(s) Oral at bedtime  sodium chloride 0.9% lock flush 3 milliLiter(s) IV Push every 8 hours      Allergies    No Known Allergies    Intolerances        SOCIAL HISTORY:  Denies alcohol abuse, drug abuse or tobacco usage.     FAMILY HISTORY:  Family history of depression (Mother)    FH: CAD (coronary artery disease) (Sibling, Sibling)    Family history of diabetes mellitus (DM) (Sibling)        VITALS:  T(C): 36.2 (09-23-22 @ 16:00), Max: 36.8 (09-23-22 @ 14:00)  HR: 77 (09-23-22 @ 17:30) (71 - 87)  BP: 122/76 (09-23-22 @ 17:00) (97/67 - 131/89)  RR: 22 (09-23-22 @ 17:30) (0 - 29)  SpO2: 99% (09-23-22 @ 17:30) (97% - 100%)    REVIEW OF SYSTEMS:  Denies any nausea, vomiting, diarrhea, fever or chills. Denies chest pain, SOB, focal weakness, hematuria or dysuria. Good oral intake and denies fatigue or weakness. All other pertinent systems are reviewed and are negative.    PHYSICAL EXAM:  Constitutional: NAD  HEENT: EOMI  Neck:  No JVD, supple   Respiratory: CTA B/L  Cardiovascular: S1 and S2, RRR  Gastrointestinal: + BS, soft, NT, ND  Extremities: No peripheral edema, + peripheral pulses  Neurological: A/O x 3, CN2-12 intact  Psychiatric: Normal mood, normal affect  : No Chandler  Skin: No rashes, C/D/I  Access: Not applicable    I and O's:    09-22 @ 07:01  -  09-23 @ 07:00  --------------------------------------------------------  IN: 30 mL / OUT: 0 mL / NET: 30 mL    09-23 @ 07:01  -  09-23 @ 18:02  --------------------------------------------------------  IN: 396.4 mL / OUT: 185 mL / NET: 211.4 mL      Height (cm): 181.6 (09-23 @ 05:59)  Weight (kg): 80.7 (09-23 @ 05:59)  BMI (kg/m2): 24.5 (09-23 @ 05:59)  BSA (m2): 2.02 (09-23 @ 05:59)    LABS:                        12.9   13.65 )-----------( 265      ( 23 Sep 2022 13:40 )             39.8     09-23    x   |  x   |  46<H>  ----------------------------<  196<H>  x    |  18<L>  |  1.56<H>    Ca    8.1<L>      23 Sep 2022 17:33  Phos  6.2     09-23  Mg     2.40     09-23    TPro  6.8  /  Alb  3.5  /  TBili  2.8<H>  /  DBili  x   /  AST  202<H>  /  ALT  142<H>  /  AlkPhos  166<H>  09-23      URINE:      RADIOLOGY & ADDITIONAL STUDIES:     NEPHROLOGY - NSN    Patient seen and examined.    HPI:  77 yr old male presents with hx of bipolar, BPH, SCC of leg, CHF EF 25% severe segmental LV systolic dysfunction, CAD, hospitalized on May 2022 for acute MI s/p cardiac Cath with no intervention (revealed  RCA and 70% occlusion prox LAD, 1st diagonal 70% stenosis- medical management-no intervention, IABP used for elevated right sided pressures; cardiogenic shock, found to have new 1x 1.3cm LV thrombus on Plavix and Coumadin (Coumadin completed on 08/26/2022, Plavix stopped around same time per pt), c/o one episode of difficulty walking, chest discomfort  and SOB while in the pool, CXR showed RUL nodule, PET/CT showed FDG avid RUL mass    PET/CT on 07/28/2022:  - Mildly FDG avid mixed solid and groundglass right upper lung mass measuring 3.5 x 2 cm with SUV 4.8 (image 82). A 0.3 cm left upper lung nodule (image 75) is too small to characterize.  - Small bilateral pleural effusion not significantly changed from comparison CT with minimal FDG avidity with SUV 2.2.   - Large photopenic 8.4 x 7.7 cm low-attenuation mass along the inferoposterior aspect of the right kidney, compatible with cyst.  - Nonspecific mildly FDG avid low-attenuation left adrenal nodule, possibly an adenoma. This may be further evaluated with contrast-enhanced CT or MRI.    Patient reports stopping coumadin 2 weeks ago, echo 8/25/22 in chart    Scheduled for RUL mass biopsy on 9/14/22 and followed by right VATs, right upper lobectomy 9/22/22  Patient denies any claudication, chest pain or pressure, SOB, walked for 30 minutes yesterday without s/s (06 Sep 2022 10:56)       Brought back to ICU intubated, off pressors on arrival in ICU. Cardiology team was at bedside. EKG without acute ischemic changes, Q waves in anterolateral leads. First set of trop negative.   POCUS on arrival with LVEF<20%, inferior and inferolateral walls with some contractility however apex, septum akinetic, RV dilated and hypokinetic, IVC 2cm, CVP 17   Patient was started on dobutamine @3.5mcg/kg/min, repeat CVP 6, given additional 750ml fluids ICU, medical management for hyperkalemia/acidemia.   Urine output and mental status improving, initially dark and oliguric, improving to 60ml/h    PAST MEDICAL & SURGICAL HISTORY:  BPH (benign prostatic hyperplasia)      Bipolar disorder      Depression      Anxiety      Umbilical hernia, incarcerated      Inguinal hernia of right side without obstruction or gangrene      Depression      Constipation      Urinary retention      CAD (coronary artery disease)      SCC (squamous cell carcinoma)      Lung mass      Other nonspecific abnormal finding of lung field      LV (left ventricular) mural thrombus      History of inguinal hernia      Severe left ventricular systolic dysfunction      History of CHF (congestive heart failure)      History of arthroscopy of knee  Right, 1987      History of tonsillectomy  1952      History of hernia repair      H/O coronary angiogram          MEDICATIONS  (STANDING):  albumin human  5% IVPB 250 milliLiter(s) IV Intermittent once  chlorhexidine 0.12% Liquid 15 milliLiter(s) Oral Mucosa every 12 hours  chlorhexidine 4% Liquid 1 Application(s) Topical <User Schedule>  dexMEDEtomidine Infusion 0.7 MICROgram(s)/kG/Hr (14.1 mL/Hr) IV Continuous <Continuous>  DOBUTamine Infusion 1.033 MICROgram(s)/kG/Min (2.5 mL/Hr) IV Continuous <Continuous>  heparin   Injectable 5000 Unit(s) SubCutaneous every 8 hours  insulin lispro (ADMELOG) corrective regimen sliding scale   SubCutaneous every 6 hours  senna 2 Tablet(s) Oral at bedtime  sodium chloride 0.9% lock flush 3 milliLiter(s) IV Push every 8 hours      Allergies    No Known Allergies    Intolerances        SOCIAL HISTORY:  Denies alcohol abuse, drug abuse or tobacco usage.     FAMILY HISTORY:  Family history of depression (Mother)    FH: CAD (coronary artery disease) (Sibling, Sibling)    Family history of diabetes mellitus (DM) (Sibling)        VITALS:  T(C): 36.2 (09-23-22 @ 16:00), Max: 36.8 (09-23-22 @ 14:00)  HR: 77 (09-23-22 @ 17:30) (71 - 87)  BP: 122/76 (09-23-22 @ 17:00) (97/67 - 131/89)  RR: 22 (09-23-22 @ 17:30) (0 - 29)  SpO2: 99% (09-23-22 @ 17:30) (97% - 100%)    REVIEW OF SYSTEMS:  Denies any nausea, vomiting, diarrhea, fever or chills. Denies chest pain, SOB, focal weakness, hematuria or dysuria. Good oral intake and denies fatigue or weakness. All other pertinent systems are reviewed and are negative.    PHYSICAL EXAM:  Constitutional: NAD  HEENT: EOMI  Neck:  No JVD, supple   Respiratory: CTA B/L  Cardiovascular: S1 and S2, RRR  Gastrointestinal: + BS, soft, NT, ND  Extremities: No peripheral edema, + peripheral pulses  Neurological: A/O x 3, CN2-12 intact  Psychiatric: Normal mood, normal affect  : No Chandler  Skin: No rashes, C/D/I  Access: Not applicable    I and O's:    09-22 @ 07:01  -  09-23 @ 07:00  --------------------------------------------------------  IN: 30 mL / OUT: 0 mL / NET: 30 mL    09-23 @ 07:01  -  09-23 @ 18:02  --------------------------------------------------------  IN: 396.4 mL / OUT: 185 mL / NET: 211.4 mL      Height (cm): 181.6 (09-23 @ 05:59)  Weight (kg): 80.7 (09-23 @ 05:59)  BMI (kg/m2): 24.5 (09-23 @ 05:59)  BSA (m2): 2.02 (09-23 @ 05:59)    LABS:                        12.9   13.65 )-----------( 265      ( 23 Sep 2022 13:40 )             39.8     09-23    x   |  x   |  46<H>  ----------------------------<  196<H>  x    |  18<L>  |  1.56<H>    Ca    8.1<L>      23 Sep 2022 17:33  Phos  6.2     09-23  Mg     2.40     09-23    TPro  6.8  /  Alb  3.5  /  TBili  2.8<H>  /  DBili  x   /  AST  202<H>  /  ALT  142<H>  /  AlkPhos  166<H>  09-23      URINE:      RADIOLOGY & ADDITIONAL STUDIES:     NEPHROLOGY - NSN    Patient seen and examined.    HPI:  77 yr old male presents with hx of bipolar, BPH, SCC of leg, CHF EF 25% severe segmental LV systolic dysfunction, CAD, hospitalized on May 2022 for acute MI s/p cardiac Cath with no intervention (revealed  RCA and 70% occlusion prox LAD, 1st diagonal 70% stenosis- medical management-no intervention, IABP used for elevated right sided pressures; cardiogenic shock, found to have new 1x 1.3cm LV thrombus on Plavix and Coumadin (Coumadin completed on 08/26/2022, Plavix stopped around same time per pt), c/o one episode of difficulty walking, chest discomfort  and SOB while in the pool, CXR showed RUL nodule, PET/CT showed FDG avid RUL mass    PET/CT on 07/28/2022:  - Mildly FDG avid mixed solid and groundglass right upper lung mass measuring 3.5 x 2 cm with SUV 4.8 (image 82). A 0.3 cm left upper lung nodule (image 75) is too small to characterize.  - Small bilateral pleural effusion not significantly changed from comparison CT with minimal FDG avidity with SUV 2.2.   - Large photopenic 8.4 x 7.7 cm low-attenuation mass along the inferoposterior aspect of the right kidney, compatible with cyst.  - Nonspecific mildly FDG avid low-attenuation left adrenal nodule, possibly an adenoma. This may be further evaluated with contrast-enhanced CT or MRI.    Patient reports stopping coumadin 2 weeks ago, echo 8/25/22 in chart    Scheduled for RUL mass biopsy on 9/14/22 and followed by right VATs, right upper lobectomy 9/22/22  Patient denies any claudication, chest pain or pressure, SOB, walked for 30 minutes yesterday without s/s (06 Sep 2022 10:56)       Brought back to ICU intubated, off pressors on arrival in ICU. Cardiology team was at bedside. EKG without acute ischemic changes, Q waves in anterolateral leads. First set of trop negative.   POCUS on arrival with LVEF<20%, inferior and inferolateral walls with some contractility however apex, septum akinetic, RV dilated and hypokinetic, IVC 2cm, CVP 17   Patient was started on dobutamine @3.5mcg/kg/min, repeat CVP 6, given additional 750ml fluids ICU, medical management for hyperkalemia/acidemia.   Urine output and mental status improving, initially dark and oliguric, improving to 60ml/h  He is intubated at present and all hx from the chart     PAST MEDICAL & SURGICAL HISTORY:  BPH (benign prostatic hyperplasia)      Bipolar disorder      Depression      Anxiety      Umbilical hernia, incarcerated      Inguinal hernia of right side without obstruction or gangrene      Depression      Constipation      Urinary retention      CAD (coronary artery disease)      SCC (squamous cell carcinoma)      Lung mass      Other nonspecific abnormal finding of lung field      LV (left ventricular) mural thrombus      History of inguinal hernia      Severe left ventricular systolic dysfunction      History of CHF (congestive heart failure)      History of arthroscopy of knee  Right, 1987      History of tonsillectomy  1952      History of hernia repair      H/O coronary angiogram          MEDICATIONS  (STANDING):  albumin human  5% IVPB 250 milliLiter(s) IV Intermittent once  chlorhexidine 0.12% Liquid 15 milliLiter(s) Oral Mucosa every 12 hours  chlorhexidine 4% Liquid 1 Application(s) Topical <User Schedule>  dexMEDEtomidine Infusion 0.7 MICROgram(s)/kG/Hr (14.1 mL/Hr) IV Continuous <Continuous>  DOBUTamine Infusion 1.033 MICROgram(s)/kG/Min (2.5 mL/Hr) IV Continuous <Continuous>  heparin   Injectable 5000 Unit(s) SubCutaneous every 8 hours  insulin lispro (ADMELOG) corrective regimen sliding scale   SubCutaneous every 6 hours  senna 2 Tablet(s) Oral at bedtime  sodium chloride 0.9% lock flush 3 milliLiter(s) IV Push every 8 hours      Allergies    No Known Allergies    Intolerances        SOCIAL HISTORY:  Denies alcohol abuse, drug abuse or tobacco usage.     FAMILY HISTORY:  Family history of depression (Mother)    FH: CAD (coronary artery disease) (Sibling, Sibling)    Family history of diabetes mellitus (DM) (Sibling)        VITALS:  T(C): 36.2 (09-23-22 @ 16:00), Max: 36.8 (09-23-22 @ 14:00)  HR: 77 (09-23-22 @ 17:30) (71 - 87)  BP: 122/76 (09-23-22 @ 17:00) (97/67 - 131/89)  RR: 22 (09-23-22 @ 17:30) (0 - 29)  SpO2: 99% (09-23-22 @ 17:30) (97% - 100%)    REVIEW OF SYSTEMS:  unable   PHYSICAL EXAM:  Constitutional: NAD; intubated   HEENT: EOMI  Neck:  No JVD, supple   Respiratory: CTA B/L  Cardiovascular: S1 and S2, RRR  Gastrointestinal: + BS, soft,   Extremities: No peripheral edema, + peripheral pulses  Neurological:    : +  Chandler  Skin: No rashes, C/D/I  Access: Not applicable    I and O's:    09-22 @ 07:01  -  09-23 @ 07:00  --------------------------------------------------------  IN: 30 mL / OUT: 0 mL / NET: 30 mL    09-23 @ 07:01  -  09-23 @ 18:02  --------------------------------------------------------  IN: 396.4 mL / OUT: 185 mL / NET: 211.4 mL      Height (cm): 181.6 (09-23 @ 05:59)  Weight (kg): 80.7 (09-23 @ 05:59)  BMI (kg/m2): 24.5 (09-23 @ 05:59)  BSA (m2): 2.02 (09-23 @ 05:59)    LABS:                        12.9   13.65 )-----------( 265      ( 23 Sep 2022 13:40 )             39.8     09-23    x   |  x   |  46<H>  ----------------------------<  196<H>  x    |  18<L>  |  1.56<H>    Ca    8.1<L>      23 Sep 2022 17:33  Phos  6.2     09-23  Mg     2.40     09-23    TPro  6.8  /  Alb  3.5  /  TBili  2.8<H>  /  DBili  x   /  AST  202<H>  /  ALT  142<H>  /  AlkPhos  166<H>  09-23      URINE:      RADIOLOGY & ADDITIONAL STUDIES:    < from: TTE with Doppler (w/Cont) (08.25.22 @ 07:26) >    Patient name: ELYSE MALAVE  YOB: 1945   Age: 77 (M)   MR#: [id number]  Study Date: 8/25/2022  Location: [id number]: [facility]  Study quality: Limited  Referring Physician: Jeronimo Hammond MD  Blood Pressure: 101/62 mmHg  Height: 183 cm  Weight: 83 kg  BSA: 2.1 m2  ------------------------------------------------------------------------  PROCEDURE: Transthoracic echocardiogram with 2-D, M-Mode  and complete spectral and color flow Doppler.  Intravenous ultrasound enhancing agent was administered for  improved left ventricular endocardial border definition.  Following the intravenous injection of ultrasound enhancing  agent, harmonic imaging was performed.  INDICATION: Heart failure, unspecified (I50.9)  ------------------------------------------------------------------------  OBSERVATIONS:  Left Ventricle: Severe global left ventricular systolic  dysfunction. Endocardial visualization enhanced with  intravenous injection of echo contrast (Definity).  ------------------------------------------------------------------------  CONCLUSIONS:  1. Severe global left ventricular systolic dysfunction.  Endocardial visualization enhanced with intravenous  injection of echo contrast (Definity).  ------------------------------------------------------------------------  Confirmed on  8/25/2022 - 12:35:09 by Ngoc Salamanca M.D. RPVI  ------------------------------------------------------------------------    < end of copied text >

## 2022-09-24 LAB
A1C WITH ESTIMATED AVERAGE GLUCOSE RESULT: 6 % — HIGH (ref 4–5.6)
ALBUMIN SERPL ELPH-MCNC: 3.7 G/DL — SIGNIFICANT CHANGE UP (ref 3.3–5)
ALP SERPL-CCNC: 146 U/L — HIGH (ref 40–120)
ALT FLD-CCNC: 1653 U/L — HIGH (ref 4–41)
ANION GAP SERPL CALC-SCNC: 13 MMOL/L — SIGNIFICANT CHANGE UP (ref 7–14)
ANION GAP SERPL CALC-SCNC: 13 MMOL/L — SIGNIFICANT CHANGE UP (ref 7–14)
ANION GAP SERPL CALC-SCNC: 14 MMOL/L — SIGNIFICANT CHANGE UP (ref 7–14)
ANION GAP SERPL CALC-SCNC: 14 MMOL/L — SIGNIFICANT CHANGE UP (ref 7–14)
APTT BLD: 27.2 SEC — SIGNIFICANT CHANGE UP (ref 27–36.3)
AST SERPL-CCNC: 2768 U/L — HIGH (ref 4–40)
BASOPHILS # BLD AUTO: 0.02 K/UL — SIGNIFICANT CHANGE UP (ref 0–0.2)
BASOPHILS NFR BLD AUTO: 0.1 % — SIGNIFICANT CHANGE UP (ref 0–2)
BILIRUB DIRECT SERPL-MCNC: 1.9 MG/DL — HIGH (ref 0–0.3)
BILIRUB INDIRECT FLD-MCNC: 0.9 MG/DL — SIGNIFICANT CHANGE UP (ref 0–1)
BILIRUB SERPL-MCNC: 2.8 MG/DL — HIGH (ref 0.2–1.2)
BLOOD GAS ARTERIAL - LYTES,HGB,ICA,LACT RESULT: SIGNIFICANT CHANGE UP
BLOOD GAS ARTERIAL COMPREHENSIVE RESULT: SIGNIFICANT CHANGE UP
BUN SERPL-MCNC: 46 MG/DL — HIGH (ref 7–23)
BUN SERPL-MCNC: 46 MG/DL — HIGH (ref 7–23)
BUN SERPL-MCNC: 47 MG/DL — HIGH (ref 7–23)
BUN SERPL-MCNC: 48 MG/DL — HIGH (ref 7–23)
CALCIUM SERPL-MCNC: 8.7 MG/DL — SIGNIFICANT CHANGE UP (ref 8.4–10.5)
CALCIUM SERPL-MCNC: 8.8 MG/DL — SIGNIFICANT CHANGE UP (ref 8.4–10.5)
CALCIUM SERPL-MCNC: 8.8 MG/DL — SIGNIFICANT CHANGE UP (ref 8.4–10.5)
CALCIUM SERPL-MCNC: 8.9 MG/DL — SIGNIFICANT CHANGE UP (ref 8.4–10.5)
CHLORIDE SERPL-SCNC: 96 MMOL/L — LOW (ref 98–107)
CHLORIDE SERPL-SCNC: 96 MMOL/L — LOW (ref 98–107)
CHLORIDE SERPL-SCNC: 97 MMOL/L — LOW (ref 98–107)
CHLORIDE SERPL-SCNC: 98 MMOL/L — SIGNIFICANT CHANGE UP (ref 98–107)
CK MB BLD-MCNC: 0.9 % — SIGNIFICANT CHANGE UP (ref 0–2.5)
CK MB CFR SERPL CALC: 6 NG/ML — SIGNIFICANT CHANGE UP
CK SERPL-CCNC: 642 U/L — HIGH (ref 30–200)
CO2 SERPL-SCNC: 19 MMOL/L — LOW (ref 22–31)
CO2 SERPL-SCNC: 20 MMOL/L — LOW (ref 22–31)
CO2 SERPL-SCNC: 21 MMOL/L — LOW (ref 22–31)
CO2 SERPL-SCNC: 22 MMOL/L — SIGNIFICANT CHANGE UP (ref 22–31)
CREAT SERPL-MCNC: 1.16 MG/DL — SIGNIFICANT CHANGE UP (ref 0.5–1.3)
CREAT SERPL-MCNC: 1.27 MG/DL — SIGNIFICANT CHANGE UP (ref 0.5–1.3)
CREAT SERPL-MCNC: 1.28 MG/DL — SIGNIFICANT CHANGE UP (ref 0.5–1.3)
CREAT SERPL-MCNC: 1.3 MG/DL — SIGNIFICANT CHANGE UP (ref 0.5–1.3)
EGFR: 57 ML/MIN/1.73M2 — LOW
EGFR: 58 ML/MIN/1.73M2 — LOW
EGFR: 58 ML/MIN/1.73M2 — LOW
EGFR: 65 ML/MIN/1.73M2 — SIGNIFICANT CHANGE UP
EOSINOPHIL # BLD AUTO: 0 K/UL — SIGNIFICANT CHANGE UP (ref 0–0.5)
EOSINOPHIL NFR BLD AUTO: 0 % — SIGNIFICANT CHANGE UP (ref 0–6)
ESTIMATED AVERAGE GLUCOSE: 126 — SIGNIFICANT CHANGE UP
GLUCOSE BLDC GLUCOMTR-MCNC: 137 MG/DL — HIGH (ref 70–99)
GLUCOSE SERPL-MCNC: 116 MG/DL — HIGH (ref 70–99)
GLUCOSE SERPL-MCNC: 119 MG/DL — HIGH (ref 70–99)
GLUCOSE SERPL-MCNC: 130 MG/DL — HIGH (ref 70–99)
GLUCOSE SERPL-MCNC: 150 MG/DL — HIGH (ref 70–99)
HCT VFR BLD CALC: 33.5 % — LOW (ref 39–50)
HCT VFR BLD CALC: 34.5 % — LOW (ref 39–50)
HGB BLD-MCNC: 11.5 G/DL — LOW (ref 13–17)
HGB BLD-MCNC: 11.9 G/DL — LOW (ref 13–17)
IANC: 13.14 K/UL — HIGH (ref 1.8–7.4)
IMM GRANULOCYTES NFR BLD AUTO: 0.5 % — SIGNIFICANT CHANGE UP (ref 0–0.9)
INR BLD: 1.76 RATIO — HIGH (ref 0.88–1.16)
LYMPHOCYTES # BLD AUTO: 0.61 K/UL — LOW (ref 1–3.3)
LYMPHOCYTES # BLD AUTO: 4 % — LOW (ref 13–44)
MAGNESIUM SERPL-MCNC: 2.4 MG/DL — SIGNIFICANT CHANGE UP (ref 1.6–2.6)
MCHC RBC-ENTMCNC: 31.3 PG — SIGNIFICANT CHANGE UP (ref 27–34)
MCHC RBC-ENTMCNC: 31.5 PG — SIGNIFICANT CHANGE UP (ref 27–34)
MCHC RBC-ENTMCNC: 34.3 GM/DL — SIGNIFICANT CHANGE UP (ref 32–36)
MCHC RBC-ENTMCNC: 34.5 GM/DL — SIGNIFICANT CHANGE UP (ref 32–36)
MCV RBC AUTO: 91.3 FL — SIGNIFICANT CHANGE UP (ref 80–100)
MCV RBC AUTO: 91.3 FL — SIGNIFICANT CHANGE UP (ref 80–100)
MONOCYTES # BLD AUTO: 1.53 K/UL — HIGH (ref 0–0.9)
MONOCYTES NFR BLD AUTO: 9.9 % — SIGNIFICANT CHANGE UP (ref 2–14)
NEUTROPHILS # BLD AUTO: 13.14 K/UL — HIGH (ref 1.8–7.4)
NEUTROPHILS NFR BLD AUTO: 85.5 % — HIGH (ref 43–77)
NRBC # BLD: 0 /100 WBCS — SIGNIFICANT CHANGE UP (ref 0–0)
NRBC # BLD: 0 /100 WBCS — SIGNIFICANT CHANGE UP (ref 0–0)
NRBC # FLD: 0 K/UL — SIGNIFICANT CHANGE UP (ref 0–0)
NRBC # FLD: 0.02 K/UL — HIGH (ref 0–0)
PHOSPHATE SERPL-MCNC: 2.8 MG/DL — SIGNIFICANT CHANGE UP (ref 2.5–4.5)
PHOSPHATE SERPL-MCNC: 3.3 MG/DL — SIGNIFICANT CHANGE UP (ref 2.5–4.5)
PHOSPHATE SERPL-MCNC: 4 MG/DL — SIGNIFICANT CHANGE UP (ref 2.5–4.5)
PHOSPHATE SERPL-MCNC: 4.4 MG/DL — SIGNIFICANT CHANGE UP (ref 2.5–4.5)
PLATELET # BLD AUTO: 201 K/UL — SIGNIFICANT CHANGE UP (ref 150–400)
PLATELET # BLD AUTO: 213 K/UL — SIGNIFICANT CHANGE UP (ref 150–400)
POTASSIUM SERPL-MCNC: 4.5 MMOL/L — SIGNIFICANT CHANGE UP (ref 3.5–5.3)
POTASSIUM SERPL-SCNC: 4.5 MMOL/L — SIGNIFICANT CHANGE UP (ref 3.5–5.3)
PROT SERPL-MCNC: 6.3 G/DL — SIGNIFICANT CHANGE UP (ref 6–8.3)
PROTHROM AB SERPL-ACNC: 20.5 SEC — HIGH (ref 10.5–13.4)
RBC # BLD: 3.67 M/UL — LOW (ref 4.2–5.8)
RBC # BLD: 3.78 M/UL — LOW (ref 4.2–5.8)
RBC # FLD: 15.5 % — HIGH (ref 10.3–14.5)
RBC # FLD: 15.7 % — HIGH (ref 10.3–14.5)
SODIUM SERPL-SCNC: 129 MMOL/L — LOW (ref 135–145)
SODIUM SERPL-SCNC: 130 MMOL/L — LOW (ref 135–145)
SODIUM SERPL-SCNC: 131 MMOL/L — LOW (ref 135–145)
SODIUM SERPL-SCNC: 133 MMOL/L — LOW (ref 135–145)
TROPONIN T, HIGH SENSITIVITY RESULT: 44 NG/L — SIGNIFICANT CHANGE UP
WBC # BLD: 15.38 K/UL — HIGH (ref 3.8–10.5)
WBC # BLD: 17.47 K/UL — HIGH (ref 3.8–10.5)
WBC # FLD AUTO: 15.38 K/UL — HIGH (ref 3.8–10.5)
WBC # FLD AUTO: 17.47 K/UL — HIGH (ref 3.8–10.5)

## 2022-09-24 PROCEDURE — 93306 TTE W/DOPPLER COMPLETE: CPT | Mod: 26

## 2022-09-24 PROCEDURE — 71045 X-RAY EXAM CHEST 1 VIEW: CPT | Mod: 26

## 2022-09-24 PROCEDURE — 99292 CRITICAL CARE ADDL 30 MIN: CPT

## 2022-09-24 PROCEDURE — 99291 CRITICAL CARE FIRST HOUR: CPT

## 2022-09-24 RX ORDER — ASPIRIN/CALCIUM CARB/MAGNESIUM 324 MG
81 TABLET ORAL DAILY
Refills: 0 | Status: DISCONTINUED | OUTPATIENT
Start: 2022-09-24 | End: 2022-10-01

## 2022-09-24 RX ORDER — OXYCODONE HYDROCHLORIDE 5 MG/1
5 TABLET ORAL EVERY 6 HOURS
Refills: 0 | Status: DISCONTINUED | OUTPATIENT
Start: 2022-09-24 | End: 2022-09-30

## 2022-09-24 RX ORDER — TAMSULOSIN HYDROCHLORIDE 0.4 MG/1
0.4 CAPSULE ORAL AT BEDTIME
Refills: 0 | Status: DISCONTINUED | OUTPATIENT
Start: 2022-09-24 | End: 2022-10-01

## 2022-09-24 RX ORDER — POLYETHYLENE GLYCOL 3350 17 G/17G
17 POWDER, FOR SOLUTION ORAL DAILY
Refills: 0 | Status: DISCONTINUED | OUTPATIENT
Start: 2022-09-24 | End: 2022-10-01

## 2022-09-24 RX ORDER — MIRTAZAPINE 45 MG/1
7.5 TABLET, ORALLY DISINTEGRATING ORAL AT BEDTIME
Refills: 0 | Status: DISCONTINUED | OUTPATIENT
Start: 2022-09-24 | End: 2022-09-25

## 2022-09-24 RX ORDER — ATORVASTATIN CALCIUM 80 MG/1
80 TABLET, FILM COATED ORAL AT BEDTIME
Refills: 0 | Status: DISCONTINUED | OUTPATIENT
Start: 2022-09-24 | End: 2022-09-25

## 2022-09-24 RX ORDER — PANTOPRAZOLE SODIUM 20 MG/1
40 TABLET, DELAYED RELEASE ORAL DAILY
Refills: 0 | Status: DISCONTINUED | OUTPATIENT
Start: 2022-09-24 | End: 2022-10-01

## 2022-09-24 RX ORDER — HYDROMORPHONE HYDROCHLORIDE 2 MG/ML
0.25 INJECTION INTRAMUSCULAR; INTRAVENOUS; SUBCUTANEOUS EVERY 6 HOURS
Refills: 0 | Status: DISCONTINUED | OUTPATIENT
Start: 2022-09-24 | End: 2022-09-30

## 2022-09-24 RX ORDER — ACETAMINOPHEN 500 MG
1000 TABLET ORAL ONCE
Refills: 0 | Status: COMPLETED | OUTPATIENT
Start: 2022-09-24 | End: 2022-09-24

## 2022-09-24 RX ORDER — LIDOCAINE 4 G/100G
1 CREAM TOPICAL DAILY
Refills: 0 | Status: DISCONTINUED | OUTPATIENT
Start: 2022-09-24 | End: 2022-10-01

## 2022-09-24 RX ORDER — VENLAFAXINE HCL 75 MG
150 CAPSULE, EXT RELEASE 24 HR ORAL DAILY
Refills: 0 | Status: DISCONTINUED | OUTPATIENT
Start: 2022-09-25 | End: 2022-09-25

## 2022-09-24 RX ADMIN — PANTOPRAZOLE SODIUM 40 MILLIGRAM(S): 20 TABLET, DELAYED RELEASE ORAL at 15:15

## 2022-09-24 RX ADMIN — LIDOCAINE 1 PATCH: 4 CREAM TOPICAL at 22:17

## 2022-09-24 RX ADMIN — SENNA PLUS 2 TABLET(S): 8.6 TABLET ORAL at 21:55

## 2022-09-24 RX ADMIN — HEPARIN SODIUM 5000 UNIT(S): 5000 INJECTION INTRAVENOUS; SUBCUTANEOUS at 21:56

## 2022-09-24 RX ADMIN — TAMSULOSIN HYDROCHLORIDE 0.4 MILLIGRAM(S): 0.4 CAPSULE ORAL at 21:55

## 2022-09-24 RX ADMIN — LIDOCAINE 1 PATCH: 4 CREAM TOPICAL at 19:48

## 2022-09-24 RX ADMIN — Medication 1000 MILLIGRAM(S): at 03:30

## 2022-09-24 RX ADMIN — CHLORHEXIDINE GLUCONATE 1 APPLICATION(S): 213 SOLUTION TOPICAL at 05:35

## 2022-09-24 RX ADMIN — Medication 81 MILLIGRAM(S): at 12:02

## 2022-09-24 RX ADMIN — MIRTAZAPINE 7.5 MILLIGRAM(S): 45 TABLET, ORALLY DISINTEGRATING ORAL at 22:15

## 2022-09-24 RX ADMIN — HEPARIN SODIUM 5000 UNIT(S): 5000 INJECTION INTRAVENOUS; SUBCUTANEOUS at 14:06

## 2022-09-24 RX ADMIN — Medication 400 MILLIGRAM(S): at 03:00

## 2022-09-24 RX ADMIN — SODIUM CHLORIDE 3 MILLILITER(S): 9 INJECTION INTRAMUSCULAR; INTRAVENOUS; SUBCUTANEOUS at 05:14

## 2022-09-24 RX ADMIN — LIDOCAINE 1 PATCH: 4 CREAM TOPICAL at 12:02

## 2022-09-24 RX ADMIN — HEPARIN SODIUM 5000 UNIT(S): 5000 INJECTION INTRAVENOUS; SUBCUTANEOUS at 05:35

## 2022-09-24 RX ADMIN — POLYETHYLENE GLYCOL 3350 17 GRAM(S): 17 POWDER, FOR SOLUTION ORAL at 12:02

## 2022-09-24 NOTE — PROGRESS NOTE ADULT - SUBJECTIVE AND OBJECTIVE BOX
Patient seen and examined  No complaints  Up in chair  on NA    REVIEW OF SYSTEMS:  As per HPI, otherwise 8 full 10 ROS were unremarkable    MEDICATIONS  (STANDING):  albumin human  5% IVPB 250 milliLiter(s) IV Intermittent once  chlorhexidine 4% Liquid 1 Application(s) Topical <User Schedule>  dexMEDEtomidine Infusion 0.7 MICROgram(s)/kG/Hr (14.1 mL/Hr) IV Continuous <Continuous>  DOBUTamine Infusion 1.033 MICROgram(s)/kG/Min (2.5 mL/Hr) IV Continuous <Continuous>  heparin   Injectable 5000 Unit(s) SubCutaneous every 8 hours  insulin lispro (ADMELOG) corrective regimen sliding scale   SubCutaneous every 6 hours  senna 2 Tablet(s) Oral at bedtime  sodium chloride 0.9% lock flush 3 milliLiter(s) IV Push every 8 hours      VITAL:  T(C): , Max: 37 (22 @ 20:00)  T(F): , Max: 98.6 (22 @ 20:00)  HR: 77 (22 @ 07:00)  BP: 113/79 (22 @ 21:00)  BP(mean): 91 (22 @ 21:00)  RR: 18 (22 @ 07:00)  SpO2: 95% (22 @ 07:00)  Wt(kg): --    I and O's:     @ 07:01  -   @ 07:00  --------------------------------------------------------  IN: 759.1 mL / OUT: 1800 mL / NET: -1040.9 mL     @ 07:01  -   @ 07:56  --------------------------------------------------------  IN: 10.9 mL / OUT: 125 mL / NET: -114.1 mL          PHYSICAL EXAM:    Constitutional: NAD  HEENT: PERRLA, EOMI,  MMM  Neck: No LAD, No JVD  Respiratory: CTAB  Cardiovascular: S1 and S2  Gastrointestinal: BS+, soft, NT/ND  Extremities: No peripheral edema  Neurological: A/O x 3, no focal deficits  Psychiatric: Normal mood, normal affect  : No Chandler    LABS:                        11.5   15.38 )-----------( 201      ( 24 Sep 2022 03:10 )             33.5         129<L>  |  96<L>  |  46<H>  ----------------------------<  150<H>  4.5   |  19<L>  |  1.28    Ca    8.8      24 Sep 2022 03:10  Phos  4.4       Mg     2.40         TPro  6.3  /  Alb  3.7  /  TBili  2.8<H>  /  DBili  1.9<H>  /  AST  2768<H>  /  ALT  1653<H>  /  AlkPhos  146<H>        Assessment    77 yr old male presents with hx of bipolar, BPH, SCC of leg, CHF EF 25% severe segmental LV systolic dysfunction, CAD, hospitalized on May 2022 for acute MI s/p cardiac Cath with no intervention (revealed  RCA and 70% occlusion prox LAD, 1st diagonal 70% stenosis  Right VATs, right upper lobectomy 22  Hypotension sp IVF and sp pressors.  Hypotension from cardiogenic shock leading to BRODY   Metabolic acidosis   SP Hyperkalemia   Respiratory failure/ embarrassment leading to mechanical intubation     1 Renal- BRODY is from cariogenic shock at present and he is on  gtt.  Improving   2 CVS-Severe global reduction in EF and on inotropes;   Diuretics as needed   3 Pulm- CXR, BNP  4.  Fluid restriction 1.2L per day   5.  Trend WBC and LFTs    d/w CTU    Pamela Fung MD  [facility]  Cell: [phone]

## 2022-09-24 NOTE — PHYSICAL THERAPY INITIAL EVALUATION ADULT - DID THE PATIENT HAVE SURGERY?
s/p FB, R VATS. Unable to complete RUL Wedge Resection due to tissue density, R Upper Lobectomy performed/yes

## 2022-09-24 NOTE — PHYSICAL THERAPY INITIAL EVALUATION ADULT - ADDITIONAL COMMENTS
Pt lives in a private house with wife, +3 steps to enter, independent with all functional mobility and ADLs without use of an assistive device.     Pt left seated comfortably in recliner at bedside, all lines intact, call bell in reach, in NAD. BETTY Alba present and aware.

## 2022-09-24 NOTE — PHYSICAL THERAPY INITIAL EVALUATION ADULT - PERTINENT HX OF CURRENT PROBLEM, REHAB EVAL
77 year old male presents with hx of bipolar, BPH, SCC of leg, CHF EF 25% severe segmental LV systolic dysfunction, CAD, hospitalized on May 2022 for acute MI s/p cardiac Cath with no intervention. S/p FB, Right VATS. Unable to complete RUL Wedge Resection due to tissue density, Right Upper Lobectomy performed.

## 2022-09-24 NOTE — PROGRESS NOTE ADULT - SUBJECTIVE AND OBJECTIVE BOX
CHIEF COMPLAINT: FOLLOW UP IN ICU FOR POSTOPERATIVE CARE OF PATIENT WHO IS S/P  RUL lobectomy, cardiogenic shock            PROCEDURES:    R VATS, R Upper Lobectomy. 23-Sep-2022         ISSUES:    Cardiogenic shock requiring dobutamine gtt  Systolic heart failure (EF 25%)  Ischemic hepatitis  Lactic acidosis  BRODY  CAD  Lung mass  Post op pain  Chest tube in place  Anxiety  Depression  Bipolar disorder  BPH  Hyponatremia        INTERVAL EVENTS:    Patient extubated overnight.  Received patient on dobutamine gtt at 4.5mcg/kg.  Lactic acid remains elevated on repeat labs.  Good UOP while on dobutamine.  Chest tube with airleak       HISTORY:    Patient reports moderate pain at chest wall incision sites which is worse with coughing and deep breathing without associated fever or dyspnea. Pain is improved with use of pain meds.            PHYSICAL EXAM:   Gen: Comfortable, No acute distress  Eyes: Sclera white, Conjunctiva normal, Eyelids normal, Pupils symmetrical   ENT: Mucous membranes moist,  ,  ,    Neck: Trachea midline,  ,  ,  ,  ,  ,    CV: Rate regular, Rhythm regular,  ,  ,    Resp: Breath sounds clear, No accessory muscles use, R chest tube in place,  ,    Abd: Soft, Non-distended, Non-tender, Bowel sounds normal,  ,  ,    Skin: Warm, No peripheral edema of lower extremities,  ,    : Chandler in place  Neuro: Moving all 4 extremities,    Psych: A&Ox3              ASSESSMENT AND PLAN:           NEURO:     Post-operative Pain - Stable. Pain control with oxycodone PO    Depression, Anxiety, Bipolar disorder - stable. continue Effextor XR daily.         RESPIRATORY:     Hypoxia - Wean nasal cannula for goal O2sat above 92. Obtain CXR. Incentive spirometry. Chest PT and frequent suctioning. Continue bronchodilators. OOB to chair & ambulate w/ assistance. Continuous pulse oximetry for support & to prevent decompensation.    Pneumothorax - stable. Chest tube to Pleurevac regulated suctioning. Monitor chest tube output.            CARDIOVASCULAR:     Cardiogenic shock - dobutamine. Titrate based on end organ perfusion (urine output, lactic acid, mental status).  CAD - stable. Continue aspirin. Cardiology consult. Troponin x2 negative since post op on 9/23/22.          Telemetry (medical test) - Reviewed by me today independently. Normal sinus rhythm.         RENAL:     BRODY – Renally dose medications. Monitor for hyperkalemia and uremia. Avoid nephrotoxic medications. Monitor IOs and electrolytes.    BPH - Stable. Flomax qday    Hyponatremia - Secondary to low flow from cardiogenic shock. Improving. Monitor Na. Avoid overcorrection. Goal to correct no more than 10 every 24 hours.              GASTROINTESTINAL:     GI prophylaxis with pantoprazole    Zofran and Reglan IV PRN for nausea     Regular consistency diet    Ischemic hepatitis from shock liver - Stable. Trend LFTs and INR. Maintain organ perfusion         HEMATOLOGIC:     No signs of active bleeding. Monitor Hgb in CBC in AM     DVT prophylaxis with heparin subQ and SCDs.             INFECTIOUS DISEASE:     All surgical sites appear clean. No signs of active infection. Will monitor for fever and leukocytosis.           ENDOCRINE:     Stable – Monitor glucose fingersticks for goal 120-180.        ONCOLOGY:     Lung mass - Improved. S/P resection. Follow up final pathology.         Pertinent clinical, laboratory, radiographic, hemodynamic, echocardiographic, respiratory data, microbiologic data and chart were reviewed by myself and analyzed frequently throughout the course of the day and night by myself.     Plan discussed at length with the CTICU staff and Attending CT Surgeon -   Dr Loni Jones      Patient required critical care management.  75 minutes were spent evaluating, managing, providing, coordinating, and documenting care for this patient, exclusive of procedures from  7AM to 1159PM.	    _________________________  VITAL SIGNS:  Vital Signs Last 24 Hrs  T(C): 35.7 (24 Sep 2022 12:00), Max: 37 (23 Sep 2022 20:00)  T(F): 96.2 (24 Sep 2022 12:00), Max: 98.6 (23 Sep 2022 20:00)  HR: 77 (24 Sep 2022 12:00) (66 - 87)  BP: 113/79 (23 Sep 2022 21:00) (97/67 - 131/89)  BP(mean): 91 (23 Sep 2022 21:00) (77 - 105)  RR: 18 (24 Sep 2022 12:00) (0 - 29)  SpO2: 98% (24 Sep 2022 12:00) (95% - 100%)    Parameters below as of 24 Sep 2022 11:00  Patient On (Oxygen Delivery Method): room air      I/Os:   I&O's Detail    23 Sep 2022 07:01  -  24 Sep 2022 07:00  --------------------------------------------------------  IN:    Dexmedetomidine: 20.2 mL    DOBUTamine: 188.9 mL    IV PiggyBack: 300 mL    Lactated Ringers Bolus: 250 mL  Total IN: 759.1 mL    OUT:    Chest Tube (mL): 440 mL    Indwelling Catheter - Urethral (mL): 1360 mL  Total OUT: 1800 mL    Total NET: -1040.9 mL      24 Sep 2022 07:01  -  24 Sep 2022 12:39  --------------------------------------------------------  IN:    DOBUTamine: 54.5 mL  Total IN: 54.5 mL    OUT:    Chest Tube (mL): 100 mL    Indwelling Catheter - Urethral (mL): 475 mL  Total OUT: 575 mL    Total NET: -520.5 mL          Mode: AC/ CMV (Assist Control/ Continuous Mandatory Ventilation)  RR (machine): 22  TV (machine): 450  FiO2: 40  PEEP: 5  MAP: 9  PIP: 16      MEDICATIONS:  MEDICATIONS  (STANDING):  aspirin enteric coated 81 milliGRAM(s) Oral daily  atorvastatin 80 milliGRAM(s) Oral at bedtime  chlorhexidine 4% Liquid 1 Application(s) Topical <User Schedule>  DOBUTamine Infusion 1.033 MICROgram(s)/kG/Min (2.5 mL/Hr) IV Continuous <Continuous>  heparin   Injectable 5000 Unit(s) SubCutaneous every 8 hours  lidocaine   4% Patch 1 Patch Transdermal daily  mirtazapine 7.5 milliGRAM(s) Oral at bedtime  polyethylene glycol 3350 17 Gram(s) Oral daily  senna 2 Tablet(s) Oral at bedtime  tamsulosin 0.4 milliGRAM(s) Oral at bedtime    MEDICATIONS  (PRN):  HYDROmorphone  Injectable 0.25 milliGRAM(s) IV Push every 6 hours PRN Severe Pain (7 - 10)  oxyCODONE    IR 5 milliGRAM(s) Oral every 6 hours PRN Moderate Pain (4 - 6)      LABS:  Laboratory data was independently reviewed by me today.                           11.9   17.47 )-----------( 213      ( 24 Sep 2022 10:00 )             34.5     09-24    130<L>  |  96<L>  |  46<H>  ----------------------------<  119<H>  4.5   |  20<L>  |  1.30    Ca    8.8      24 Sep 2022 10:00  Phos  4.0     09-24  Mg     2.40     09-24    TPro  6.3  /  Alb  3.7  /  TBili  2.8<H>  /  DBili  1.9<H>  /  AST  2768<H>  /  ALT  1653<H>  /  AlkPhos  146<H>  09-24    LIVER FUNCTIONS - ( 24 Sep 2022 03:10 )  Alb: 3.7 g/dL / Pro: 6.3 g/dL / ALK PHOS: 146 U/L / ALT: 1653 U/L / AST: 2768 U/L / GGT: x           PT/INR - ( 24 Sep 2022 10:00 )   PT: 20.5 sec;   INR: 1.76 ratio         PTT - ( 24 Sep 2022 10:00 )  PTT:27.2 sec  ABG - ( 24 Sep 2022 10:00 )  pH, Arterial: 7.46  pH, Blood: x     /  pCO2: 30    /  pO2: 75    / HCO3: 21    / Base Excess: -1.7  /  SaO2: 94.1                  RADIOLOGY:   Radiology images were independently reviewed by me today. Reports were reviewed by me today.    Xray Chest 1 View- PORTABLE-Routine:   ACC: [id number] EXAM:  XR CHEST PORTABLE ROUTINE 1V                          PROCEDURE DATE:  09/24/2022          INTERPRETATION:  CLINICAL INFORMATION: Postop    TIME OF EXAMINATION: September 24 at 6:33 AM    EXAM: Portable chest    FINDINGS:  Right-sided chest tube unchanged. The endotracheal tube has been removed.   Right IJ line in position.    Lungs are clear with no pneumothorax appreciated. Heart size is stable.        COMPARISON: September 23        IMPRESSION: Follow-up right thoracotomy with chest tube post extubation.    --- End of Report ---            SARA KIMBLE MD; Attending Radiologist  This document has been electronically signed. Sep 24 2022 11:03AM (09-24-22 @ 09:35)  Xray Chest 1 View- PORTABLE-Urgent:   ACC: [id number] EXAM:  XR CHEST PORTABLE URGENT 1V                          PROCEDURE DATE:  09/23/2022          INTERPRETATION:  CLINICAL INFORMATION: Postop    TIME OF EXAMINATION: September 23, 2022 at 3:10 PM    EXAM: Portable chest    FINDINGS:  Right upper lobe mass has been surgically removed. Right-sided chest tube   in place with loss of volume in this lung but no consolidation, effusion   or pneumothorax. Left lung is clear. Right mediastinal widening.        COMPARISON: August 25        IMPRESSION: Status post right thoracotomy with chest tube.    --- End of Report ---            SARA KIMBLE MD; Attending Radiologist  This document has been electronically signed. Sep 23 2022  3:49PM (09-23-22 @ 15:22)  Xray Chest 1 View AP/PA:   ACC: [id number] EXAM:  XR CHEST AP OR PA 1V                          PROCEDURE DATE:  09/14/2022          INTERPRETATION:  CLINICAL INFORMATION: Right lung mass    TIME OF EXAMINATION: September 14 at 2:13 PM and 2:14 PM    EXAM: Portable chest    FINDINGS:  Tiny pneumothorax suggested on the radiograph obtained at 2:14 PM.  Right   upper lobe opacity is present. Remainder of the lungs are clear. Heart   size is stable. No pleural effusion.        COMPARISON: August 29, 2022        IMPRESSION: Possible tiny pneumothorax post lung biopsy.    --- End of Report ---            SARA KIMBLE MD; Attending Radiologist  This document has been electronically signed. Sep 14 2022  6:29PM (09-14-22 @ 14:23)         CHIEF COMPLAINT: FOLLOW UP IN ICU FOR POSTOPERATIVE CARE OF PATIENT WHO IS S/P  RUL lobectomy, cardiogenic shock            PROCEDURES:    R VATS, R Upper Lobectomy. 23-Sep-2022         ISSUES:    Cardiogenic shock requiring dobutamine gtt  Systolic heart failure (EF 25%)  Diastolic heart failure stage 2  Severe pulmonary hypertension  Ischemic hepatitis  Lactic acidosis  BRODY  CAD  Lung mass  Post op pain  Chest tube in place  Anxiety  Depression  Bipolar disorder  BPH  Hyponatremia        INTERVAL EVENTS:    Patient extubated overnight.  Received patient on dobutamine gtt at 4.5mcg/kg.  Lactic acid remains elevated on repeat labs.  Good UOP while on dobutamine.  Chest tube with airleak       HISTORY:    Patient reports moderate pain at chest wall incision sites which is worse with coughing and deep breathing without associated fever or dyspnea. Pain is improved with use of pain meds.            PHYSICAL EXAM:   Gen: Comfortable, No acute distress  Eyes: Sclera white, Conjunctiva normal, Eyelids normal, Pupils symmetrical   ENT: Mucous membranes moist,  ,  ,    Neck: Trachea midline,  ,  ,  ,  ,  ,    CV: Rate regular, Rhythm regular,  ,  ,    Resp: Breath sounds clear, No accessory muscles use, R chest tube in place,  ,    Abd: Soft, Non-distended, Non-tender, Bowel sounds normal,  ,  ,    Skin: Warm, No peripheral edema of lower extremities,  ,    : Chandler in place  Neuro: Moving all 4 extremities,    Psych: A&Ox3              ASSESSMENT AND PLAN:           NEURO:     Post-operative Pain - Stable. Pain control with oxycodone PO    Depression, Anxiety, Bipolar disorder - stable. continue Effextor XR daily.         RESPIRATORY:     Hypoxia - Wean nasal cannula for goal O2sat above 92. Obtain CXR. Incentive spirometry. Chest PT and frequent suctioning. Continue bronchodilators. OOB to chair & ambulate w/ assistance. Continuous pulse oximetry for support & to prevent decompensation.    Pneumothorax - stable. Chest tube to Pleurevac regulated suctioning. Monitor chest tube output.            CARDIOVASCULAR:     Cardiogenic shock - dobutamine. Titrate based on end organ perfusion (urine output, lactic acid, mental status).  CAD - stable. Continue aspirin. Cardiology consult. Troponin x2 negative since post op on 9/23/22.          Telemetry (medical test) - Reviewed by me today independently. Normal sinus rhythm.         RENAL:     BRODY – Renally dose medications. Monitor for hyperkalemia and uremia. Avoid nephrotoxic medications. Monitor IOs and electrolytes.    BPH - Stable. Flomax qday    Hyponatremia - Secondary to low flow from cardiogenic shock. Improving. Monitor Na. Avoid overcorrection. Goal to correct no more than 10 every 24 hours.              GASTROINTESTINAL:     GI prophylaxis with pantoprazole    Zofran and Reglan IV PRN for nausea     Regular consistency diet    Ischemic hepatitis from shock liver - Stable. Trend LFTs and INR. Maintain organ perfusion         HEMATOLOGIC:     No signs of active bleeding. Monitor Hgb in CBC in AM     DVT prophylaxis with heparin subQ and SCDs.             INFECTIOUS DISEASE:     All surgical sites appear clean. No signs of active infection. Will monitor for fever and leukocytosis.           ENDOCRINE:     Stable – Monitor glucose fingersticks for goal 120-180.        ONCOLOGY:     Lung mass - Improved. S/P resection. Follow up final pathology.         Pertinent clinical, laboratory, radiographic, hemodynamic, echocardiographic, respiratory data, microbiologic data and chart were reviewed by myself and analyzed frequently throughout the course of the day and night by myself.     Plan discussed at length with the CTICU staff and Attending CT Surgeon -   Dr Loni Jones      Patient required critical care management.  75 minutes were spent evaluating, managing, providing, coordinating, and documenting care for this patient, exclusive of procedures from  7AM to 1159PM.	    _________________________  VITAL SIGNS:  Vital Signs Last 24 Hrs  T(C): 35.7 (24 Sep 2022 12:00), Max: 37 (23 Sep 2022 20:00)  T(F): 96.2 (24 Sep 2022 12:00), Max: 98.6 (23 Sep 2022 20:00)  HR: 77 (24 Sep 2022 12:00) (66 - 87)  BP: 113/79 (23 Sep 2022 21:00) (97/67 - 131/89)  BP(mean): 91 (23 Sep 2022 21:00) (77 - 105)  RR: 18 (24 Sep 2022 12:00) (0 - 29)  SpO2: 98% (24 Sep 2022 12:00) (95% - 100%)    Parameters below as of 24 Sep 2022 11:00  Patient On (Oxygen Delivery Method): room air      I/Os:   I&O's Detail    23 Sep 2022 07:01  -  24 Sep 2022 07:00  --------------------------------------------------------  IN:    Dexmedetomidine: 20.2 mL    DOBUTamine: 188.9 mL    IV PiggyBack: 300 mL    Lactated Ringers Bolus: 250 mL  Total IN: 759.1 mL    OUT:    Chest Tube (mL): 440 mL    Indwelling Catheter - Urethral (mL): 1360 mL  Total OUT: 1800 mL    Total NET: -1040.9 mL      24 Sep 2022 07:01  -  24 Sep 2022 12:39  --------------------------------------------------------  IN:    DOBUTamine: 54.5 mL  Total IN: 54.5 mL    OUT:    Chest Tube (mL): 100 mL    Indwelling Catheter - Urethral (mL): 475 mL  Total OUT: 575 mL    Total NET: -520.5 mL          Mode: AC/ CMV (Assist Control/ Continuous Mandatory Ventilation)  RR (machine): 22  TV (machine): 450  FiO2: 40  PEEP: 5  MAP: 9  PIP: 16      MEDICATIONS:  MEDICATIONS  (STANDING):  aspirin enteric coated 81 milliGRAM(s) Oral daily  atorvastatin 80 milliGRAM(s) Oral at bedtime  chlorhexidine 4% Liquid 1 Application(s) Topical <User Schedule>  DOBUTamine Infusion 1.033 MICROgram(s)/kG/Min (2.5 mL/Hr) IV Continuous <Continuous>  heparin   Injectable 5000 Unit(s) SubCutaneous every 8 hours  lidocaine   4% Patch 1 Patch Transdermal daily  mirtazapine 7.5 milliGRAM(s) Oral at bedtime  polyethylene glycol 3350 17 Gram(s) Oral daily  senna 2 Tablet(s) Oral at bedtime  tamsulosin 0.4 milliGRAM(s) Oral at bedtime    MEDICATIONS  (PRN):  HYDROmorphone  Injectable 0.25 milliGRAM(s) IV Push every 6 hours PRN Severe Pain (7 - 10)  oxyCODONE    IR 5 milliGRAM(s) Oral every 6 hours PRN Moderate Pain (4 - 6)      LABS:  Laboratory data was independently reviewed by me today.                           11.9   17.47 )-----------( 213      ( 24 Sep 2022 10:00 )             34.5     09-24    130<L>  |  96<L>  |  46<H>  ----------------------------<  119<H>  4.5   |  20<L>  |  1.30    Ca    8.8      24 Sep 2022 10:00  Phos  4.0     09-24  Mg     2.40     09-24    TPro  6.3  /  Alb  3.7  /  TBili  2.8<H>  /  DBili  1.9<H>  /  AST  2768<H>  /  ALT  1653<H>  /  AlkPhos  146<H>  09-24    LIVER FUNCTIONS - ( 24 Sep 2022 03:10 )  Alb: 3.7 g/dL / Pro: 6.3 g/dL / ALK PHOS: 146 U/L / ALT: 1653 U/L / AST: 2768 U/L / GGT: x           PT/INR - ( 24 Sep 2022 10:00 )   PT: 20.5 sec;   INR: 1.76 ratio         PTT - ( 24 Sep 2022 10:00 )  PTT:27.2 sec  ABG - ( 24 Sep 2022 10:00 )  pH, Arterial: 7.46  pH, Blood: x     /  pCO2: 30    /  pO2: 75    / HCO3: 21    / Base Excess: -1.7  /  SaO2: 94.1                  RADIOLOGY:   Radiology images were independently reviewed by me today. Reports were reviewed by me today.    Xray Chest 1 View- PORTABLE-Routine:   ACC: [id number] EXAM:  XR CHEST PORTABLE ROUTINE 1V                          PROCEDURE DATE:  09/24/2022          INTERPRETATION:  CLINICAL INFORMATION: Postop    TIME OF EXAMINATION: September 24 at 6:33 AM    EXAM: Portable chest    FINDINGS:  Right-sided chest tube unchanged. The endotracheal tube has been removed.   Right IJ line in position.    Lungs are clear with no pneumothorax appreciated. Heart size is stable.        COMPARISON: September 23        IMPRESSION: Follow-up right thoracotomy with chest tube post extubation.    --- End of Report ---            SARA KIMBLE MD; Attending Radiologist  This document has been electronically signed. Sep 24 2022 11:03AM (09-24-22 @ 09:35)  Xray Chest 1 View- PORTABLE-Urgent:   ACC: [id number] EXAM:  XR CHEST PORTABLE URGENT 1V                          PROCEDURE DATE:  09/23/2022          INTERPRETATION:  CLINICAL INFORMATION: Postop    TIME OF EXAMINATION: September 23, 2022 at 3:10 PM    EXAM: Portable chest    FINDINGS:  Right upper lobe mass has been surgically removed. Right-sided chest tube   in place with loss of volume in this lung but no consolidation, effusion   or pneumothorax. Left lung is clear. Right mediastinal widening.        COMPARISON: August 25        IMPRESSION: Status post right thoracotomy with chest tube.    --- End of Report ---            SARA KIMBLE MD; Attending Radiologist  This document has been electronically signed. Sep 23 2022  3:49PM (09-23-22 @ 15:22)  Xray Chest 1 View AP/PA:   ACC: [id number] EXAM:  XR CHEST AP OR PA 1V                          PROCEDURE DATE:  09/14/2022          INTERPRETATION:  CLINICAL INFORMATION: Right lung mass    TIME OF EXAMINATION: September 14 at 2:13 PM and 2:14 PM    EXAM: Portable chest    FINDINGS:  Tiny pneumothorax suggested on the radiograph obtained at 2:14 PM.  Right   upper lobe opacity is present. Remainder of the lungs are clear. Heart   size is stable. No pleural effusion.        COMPARISON: August 29, 2022        IMPRESSION: Possible tiny pneumothorax post lung biopsy.    --- End of Report ---            SARA KIMBLE MD; Attending Radiologist  This document has been electronically signed. Sep 14 2022  6:29PM (09-14-22 @ 14:23)

## 2022-09-24 NOTE — PHYSICAL THERAPY INITIAL EVALUATION ADULT - REHAB POTENTIAL, PT EVAL
Patient refused to use an interpretor. The daughter, Patti Aguilar was given discharge instructions. The daughter confirmed and verbalized the understanding of discharge instructions.   good, to achieve stated therapy goals

## 2022-09-24 NOTE — PHYSICAL THERAPY INITIAL EVALUATION ADULT - GENERAL OBSERVATIONS, REHAB EVAL
Pt received seated in recliner at bedside, +telemetry, +gerardo, +chest tube, comfortable and in NAD. Pt agreeable to participate in PT evaluation

## 2022-09-25 LAB
ALBUMIN SERPL ELPH-MCNC: 3.3 G/DL — SIGNIFICANT CHANGE UP (ref 3.3–5)
ALBUMIN SERPL ELPH-MCNC: 3.3 G/DL — SIGNIFICANT CHANGE UP (ref 3.3–5)
ALP SERPL-CCNC: 166 U/L — HIGH (ref 40–120)
ALP SERPL-CCNC: 195 U/L — HIGH (ref 40–120)
ALT FLD-CCNC: 1069 U/L — HIGH (ref 4–41)
ALT FLD-CCNC: 1234 U/L — HIGH (ref 4–41)
ANION GAP SERPL CALC-SCNC: 10 MMOL/L — SIGNIFICANT CHANGE UP (ref 7–14)
ANION GAP SERPL CALC-SCNC: 10 MMOL/L — SIGNIFICANT CHANGE UP (ref 7–14)
APTT BLD: 29.3 SEC — SIGNIFICANT CHANGE UP (ref 27–36.3)
AST SERPL-CCNC: 1266 U/L — HIGH (ref 4–40)
AST SERPL-CCNC: 844 U/L — HIGH (ref 4–40)
BASE EXCESS BLDV CALC-SCNC: 1.9 MMOL/L — SIGNIFICANT CHANGE UP (ref -2–3)
BASE EXCESS BLDV CALC-SCNC: 2.4 MMOL/L — SIGNIFICANT CHANGE UP (ref -2–3)
BASE EXCESS BLDV CALC-SCNC: 3.2 MMOL/L — HIGH (ref -2–3)
BILIRUB DIRECT SERPL-MCNC: 1.4 MG/DL — HIGH (ref 0–0.3)
BILIRUB INDIRECT FLD-MCNC: 0.8 MG/DL — SIGNIFICANT CHANGE UP (ref 0–1)
BILIRUB SERPL-MCNC: 2.2 MG/DL — HIGH (ref 0.2–1.2)
BILIRUB SERPL-MCNC: 2.2 MG/DL — HIGH (ref 0.2–1.2)
BLOOD GAS ARTERIAL - LYTES,HGB,ICA,LACT RESULT: SIGNIFICANT CHANGE UP
BLOOD GAS ARTERIAL - LYTES,HGB,ICA,LACT RESULT: SIGNIFICANT CHANGE UP
BLOOD GAS VENOUS COMPREHENSIVE RESULT: SIGNIFICANT CHANGE UP
BUN SERPL-MCNC: 34 MG/DL — HIGH (ref 7–23)
BUN SERPL-MCNC: 38 MG/DL — HIGH (ref 7–23)
CALCIUM SERPL-MCNC: 8.5 MG/DL — SIGNIFICANT CHANGE UP (ref 8.4–10.5)
CALCIUM SERPL-MCNC: 9 MG/DL — SIGNIFICANT CHANGE UP (ref 8.4–10.5)
CHLORIDE BLDV-SCNC: 101 MMOL/L — SIGNIFICANT CHANGE UP (ref 96–108)
CHLORIDE BLDV-SCNC: 99 MMOL/L — SIGNIFICANT CHANGE UP (ref 96–108)
CHLORIDE BLDV-SCNC: 99 MMOL/L — SIGNIFICANT CHANGE UP (ref 96–108)
CHLORIDE SERPL-SCNC: 100 MMOL/L — SIGNIFICANT CHANGE UP (ref 98–107)
CHLORIDE SERPL-SCNC: 103 MMOL/L — SIGNIFICANT CHANGE UP (ref 98–107)
CO2 BLDV-SCNC: 27.9 MMOL/L — HIGH (ref 22–26)
CO2 BLDV-SCNC: 28.5 MMOL/L — HIGH (ref 22–26)
CO2 BLDV-SCNC: 29.2 MMOL/L — HIGH (ref 22–26)
CO2 SERPL-SCNC: 22 MMOL/L — SIGNIFICANT CHANGE UP (ref 22–31)
CO2 SERPL-SCNC: 24 MMOL/L — SIGNIFICANT CHANGE UP (ref 22–31)
CREAT SERPL-MCNC: 0.93 MG/DL — SIGNIFICANT CHANGE UP (ref 0.5–1.3)
CREAT SERPL-MCNC: 1 MG/DL — SIGNIFICANT CHANGE UP (ref 0.5–1.3)
EGFR: 78 ML/MIN/1.73M2 — SIGNIFICANT CHANGE UP
EGFR: 85 ML/MIN/1.73M2 — SIGNIFICANT CHANGE UP
GAS PNL BLDV: 130 MMOL/L — LOW (ref 136–145)
GAS PNL BLDV: 131 MMOL/L — LOW (ref 136–145)
GAS PNL BLDV: 132 MMOL/L — LOW (ref 136–145)
GAS PNL BLDV: 132 MMOL/L — LOW (ref 136–145)
GAS PNL BLDV: SIGNIFICANT CHANGE UP
GAS PNL BLDV: SIGNIFICANT CHANGE UP
GLUCOSE BLDV-MCNC: 108 MG/DL — HIGH (ref 70–99)
GLUCOSE BLDV-MCNC: 124 MG/DL — HIGH (ref 70–99)
GLUCOSE BLDV-MCNC: 125 MG/DL — HIGH (ref 70–99)
GLUCOSE SERPL-MCNC: 107 MG/DL — HIGH (ref 70–99)
GLUCOSE SERPL-MCNC: 125 MG/DL — HIGH (ref 70–99)
HCO3 BLDV-SCNC: 27 MMOL/L — SIGNIFICANT CHANGE UP (ref 22–29)
HCO3 BLDV-SCNC: 27 MMOL/L — SIGNIFICANT CHANGE UP (ref 22–29)
HCO3 BLDV-SCNC: 28 MMOL/L — SIGNIFICANT CHANGE UP (ref 22–29)
HCT VFR BLD CALC: 36 % — LOW (ref 39–50)
HCT VFR BLD CALC: 36.9 % — LOW (ref 39–50)
HCT VFR BLDA CALC: 37 % — LOW (ref 39–51)
HCT VFR BLDA CALC: 38 % — LOW (ref 39–51)
HCT VFR BLDA CALC: 38 % — LOW (ref 39–51)
HGB BLD CALC-MCNC: 12.4 G/DL — LOW (ref 13–17)
HGB BLD CALC-MCNC: 12.5 G/DL — LOW (ref 13–17)
HGB BLD CALC-MCNC: 12.5 G/DL — LOW (ref 13–17)
HGB BLD-MCNC: 11.9 G/DL — LOW (ref 13–17)
HGB BLD-MCNC: 12.4 G/DL — LOW (ref 13–17)
HOROWITZ INDEX BLDV+IHG-RTO: SIGNIFICANT CHANGE UP
INR BLD: 1.49 RATIO — HIGH (ref 0.88–1.16)
LACTATE BLDV-MCNC: 1.5 MMOL/L — SIGNIFICANT CHANGE UP (ref 0.5–2)
LACTATE BLDV-MCNC: 1.6 MMOL/L — SIGNIFICANT CHANGE UP (ref 0.5–2)
LACTATE BLDV-MCNC: 1.7 MMOL/L — SIGNIFICANT CHANGE UP (ref 0.5–2)
MAGNESIUM SERPL-MCNC: 2.3 MG/DL — SIGNIFICANT CHANGE UP (ref 1.6–2.6)
MCHC RBC-ENTMCNC: 30.8 PG — SIGNIFICANT CHANGE UP (ref 27–34)
MCHC RBC-ENTMCNC: 31 PG — SIGNIFICANT CHANGE UP (ref 27–34)
MCHC RBC-ENTMCNC: 33.1 GM/DL — SIGNIFICANT CHANGE UP (ref 32–36)
MCHC RBC-ENTMCNC: 33.6 GM/DL — SIGNIFICANT CHANGE UP (ref 32–36)
MCV RBC AUTO: 92.3 FL — SIGNIFICANT CHANGE UP (ref 80–100)
MCV RBC AUTO: 93.3 FL — SIGNIFICANT CHANGE UP (ref 80–100)
NRBC # BLD: 0 /100 WBCS — SIGNIFICANT CHANGE UP (ref 0–0)
NRBC # BLD: 0 /100 WBCS — SIGNIFICANT CHANGE UP (ref 0–0)
NRBC # FLD: 0.02 K/UL — HIGH (ref 0–0)
NRBC # FLD: 0.04 K/UL — HIGH (ref 0–0)
PCO2 BLDV: 41 MMHG — LOW (ref 42–55)
PCO2 BLDV: 42 MMHG — SIGNIFICANT CHANGE UP (ref 42–55)
PCO2 BLDV: 42 MMHG — SIGNIFICANT CHANGE UP (ref 42–55)
PH BLDV: 7.42 — SIGNIFICANT CHANGE UP (ref 7.32–7.43)
PH BLDV: 7.42 — SIGNIFICANT CHANGE UP (ref 7.32–7.43)
PH BLDV: 7.43 — SIGNIFICANT CHANGE UP (ref 7.32–7.43)
PHOSPHATE SERPL-MCNC: 2.7 MG/DL — SIGNIFICANT CHANGE UP (ref 2.5–4.5)
PLATELET # BLD AUTO: 196 K/UL — SIGNIFICANT CHANGE UP (ref 150–400)
PLATELET # BLD AUTO: 216 K/UL — SIGNIFICANT CHANGE UP (ref 150–400)
PO2 BLDV: 30 MMHG — SIGNIFICANT CHANGE UP
PO2 BLDV: 34 MMHG — SIGNIFICANT CHANGE UP
PO2 BLDV: 35 MMHG — SIGNIFICANT CHANGE UP
POTASSIUM BLDV-SCNC: 4.2 MMOL/L — SIGNIFICANT CHANGE UP (ref 3.5–5.1)
POTASSIUM BLDV-SCNC: 4.3 MMOL/L — SIGNIFICANT CHANGE UP (ref 3.5–5.1)
POTASSIUM BLDV-SCNC: 4.3 MMOL/L — SIGNIFICANT CHANGE UP (ref 3.5–5.1)
POTASSIUM BLDV-SCNC: 4.7 MMOL/L — SIGNIFICANT CHANGE UP (ref 3.5–5.1)
POTASSIUM SERPL-MCNC: 4.5 MMOL/L — SIGNIFICANT CHANGE UP (ref 3.5–5.3)
POTASSIUM SERPL-MCNC: 4.5 MMOL/L — SIGNIFICANT CHANGE UP (ref 3.5–5.3)
POTASSIUM SERPL-SCNC: 4.5 MMOL/L — SIGNIFICANT CHANGE UP (ref 3.5–5.3)
POTASSIUM SERPL-SCNC: 4.5 MMOL/L — SIGNIFICANT CHANGE UP (ref 3.5–5.3)
PROT SERPL-MCNC: 6.2 G/DL — SIGNIFICANT CHANGE UP (ref 6–8.3)
PROT SERPL-MCNC: 6.3 G/DL — SIGNIFICANT CHANGE UP (ref 6–8.3)
PROTHROM AB SERPL-ACNC: 17.4 SEC — HIGH (ref 10.5–13.4)
RBC # BLD: 3.86 M/UL — LOW (ref 4.2–5.8)
RBC # BLD: 4 M/UL — LOW (ref 4.2–5.8)
RBC # FLD: 16.2 % — HIGH (ref 10.3–14.5)
RBC # FLD: 16.3 % — HIGH (ref 10.3–14.5)
SAO2 % BLDV: 46.5 % — SIGNIFICANT CHANGE UP
SAO2 % BLDV: 53.1 % — SIGNIFICANT CHANGE UP
SAO2 % BLDV: 53.3 % — SIGNIFICANT CHANGE UP
SODIUM SERPL-SCNC: 134 MMOL/L — LOW (ref 135–145)
SODIUM SERPL-SCNC: 135 MMOL/L — SIGNIFICANT CHANGE UP (ref 135–145)
WBC # BLD: 12.69 K/UL — HIGH (ref 3.8–10.5)
WBC # BLD: 13.84 K/UL — HIGH (ref 3.8–10.5)
WBC # FLD AUTO: 12.69 K/UL — HIGH (ref 3.8–10.5)
WBC # FLD AUTO: 13.84 K/UL — HIGH (ref 3.8–10.5)

## 2022-09-25 PROCEDURE — 99291 CRITICAL CARE FIRST HOUR: CPT

## 2022-09-25 PROCEDURE — 71045 X-RAY EXAM CHEST 1 VIEW: CPT | Mod: 26

## 2022-09-25 RX ORDER — METOPROLOL TARTRATE 50 MG
12.5 TABLET ORAL EVERY 12 HOURS
Refills: 0 | Status: DISCONTINUED | OUTPATIENT
Start: 2022-09-25 | End: 2022-09-26

## 2022-09-25 RX ORDER — DIPHENHYDRAMINE HCL 50 MG
12.5 CAPSULE ORAL AT BEDTIME
Refills: 0 | Status: DISCONTINUED | OUTPATIENT
Start: 2022-09-25 | End: 2022-09-25

## 2022-09-25 RX ORDER — IPRATROPIUM/ALBUTEROL SULFATE 18-103MCG
3 AEROSOL WITH ADAPTER (GRAM) INHALATION EVERY 6 HOURS
Refills: 0 | Status: DISCONTINUED | OUTPATIENT
Start: 2022-09-25 | End: 2022-09-30

## 2022-09-25 RX ORDER — SODIUM CHLORIDE 9 MG/ML
4 INJECTION INTRAMUSCULAR; INTRAVENOUS; SUBCUTANEOUS EVERY 12 HOURS
Refills: 0 | Status: DISCONTINUED | OUTPATIENT
Start: 2022-09-25 | End: 2022-09-30

## 2022-09-25 RX ORDER — ZOLPIDEM TARTRATE 10 MG/1
5 TABLET ORAL AT BEDTIME
Refills: 0 | Status: DISCONTINUED | OUTPATIENT
Start: 2022-09-25 | End: 2022-09-26

## 2022-09-25 RX ORDER — DORNASE ALFA 1 MG/ML
2.5 SOLUTION RESPIRATORY (INHALATION)
Refills: 0 | Status: DISCONTINUED | OUTPATIENT
Start: 2022-09-25 | End: 2022-10-01

## 2022-09-25 RX ORDER — LANOLIN ALCOHOL/MO/W.PET/CERES
3 CREAM (GRAM) TOPICAL AT BEDTIME
Refills: 0 | Status: DISCONTINUED | OUTPATIENT
Start: 2022-09-25 | End: 2022-10-01

## 2022-09-25 RX ADMIN — ZOLPIDEM TARTRATE 5 MILLIGRAM(S): 10 TABLET ORAL at 22:39

## 2022-09-25 RX ADMIN — SENNA PLUS 2 TABLET(S): 8.6 TABLET ORAL at 22:39

## 2022-09-25 RX ADMIN — HYDROMORPHONE HYDROCHLORIDE 0.25 MILLIGRAM(S): 2 INJECTION INTRAMUSCULAR; INTRAVENOUS; SUBCUTANEOUS at 02:25

## 2022-09-25 RX ADMIN — HYDROMORPHONE HYDROCHLORIDE 0.25 MILLIGRAM(S): 2 INJECTION INTRAMUSCULAR; INTRAVENOUS; SUBCUTANEOUS at 02:10

## 2022-09-25 RX ADMIN — SODIUM CHLORIDE 4 MILLILITER(S): 9 INJECTION INTRAMUSCULAR; INTRAVENOUS; SUBCUTANEOUS at 22:01

## 2022-09-25 RX ADMIN — POLYETHYLENE GLYCOL 3350 17 GRAM(S): 17 POWDER, FOR SOLUTION ORAL at 12:15

## 2022-09-25 RX ADMIN — LIDOCAINE 1 PATCH: 4 CREAM TOPICAL at 23:56

## 2022-09-25 RX ADMIN — PANTOPRAZOLE SODIUM 40 MILLIGRAM(S): 20 TABLET, DELAYED RELEASE ORAL at 12:14

## 2022-09-25 RX ADMIN — LIDOCAINE 1 PATCH: 4 CREAM TOPICAL at 19:18

## 2022-09-25 RX ADMIN — Medication 3 MILLILITER(S): at 15:28

## 2022-09-25 RX ADMIN — LIDOCAINE 1 PATCH: 4 CREAM TOPICAL at 12:15

## 2022-09-25 RX ADMIN — Medication 12.5 MILLIGRAM(S): at 17:15

## 2022-09-25 RX ADMIN — Medication 3 MILLILITER(S): at 22:39

## 2022-09-25 RX ADMIN — TAMSULOSIN HYDROCHLORIDE 0.4 MILLIGRAM(S): 0.4 CAPSULE ORAL at 22:39

## 2022-09-25 RX ADMIN — HEPARIN SODIUM 5000 UNIT(S): 5000 INJECTION INTRAVENOUS; SUBCUTANEOUS at 13:30

## 2022-09-25 RX ADMIN — DORNASE ALFA 2.5 MILLIGRAM(S): 1 SOLUTION RESPIRATORY (INHALATION) at 22:00

## 2022-09-25 RX ADMIN — HEPARIN SODIUM 5000 UNIT(S): 5000 INJECTION INTRAVENOUS; SUBCUTANEOUS at 06:14

## 2022-09-25 RX ADMIN — CHLORHEXIDINE GLUCONATE 1 APPLICATION(S): 213 SOLUTION TOPICAL at 06:14

## 2022-09-25 RX ADMIN — HEPARIN SODIUM 5000 UNIT(S): 5000 INJECTION INTRAVENOUS; SUBCUTANEOUS at 22:39

## 2022-09-25 RX ADMIN — Medication 3 MILLIGRAM(S): at 02:11

## 2022-09-25 RX ADMIN — Medication 81 MILLIGRAM(S): at 12:14

## 2022-09-25 NOTE — PROGRESS NOTE ADULT - SUBJECTIVE AND OBJECTIVE BOX
Patient seen and examined  Feels better  remains on  gtt    REVIEW OF SYSTEMS:  As per HPI, otherwise 8 full 10 ROS were unremarkable    MEDICATIONS  (STANDING):  aspirin enteric coated 81 milliGRAM(s) Oral daily  atorvastatin 80 milliGRAM(s) Oral at bedtime  chlorhexidine 4% Liquid 1 Application(s) Topical <User Schedule>  DOBUTamine Infusion 4.5 MICROgram(s)/kG/Min (10.9 mL/Hr) IV Continuous <Continuous>  heparin   Injectable 5000 Unit(s) SubCutaneous every 8 hours  lidocaine   4% Patch 1 Patch Transdermal daily  mirtazapine 7.5 milliGRAM(s) Oral at bedtime  pantoprazole  Injectable 40 milliGRAM(s) IV Push daily  polyethylene glycol 3350 17 Gram(s) Oral daily  senna 2 Tablet(s) Oral at bedtime  tamsulosin 0.4 milliGRAM(s) Oral at bedtime  venlafaxine XR. 150 milliGRAM(s) Oral daily      VITAL:  T(C): , Max: 36.7 (22 @ 00:00)  T(F): , Max: 98 (22 @ 00:00)  HR: 82 (22 @ 07:00)  BP: --  BP(mean): --  RR: 19 (22 @ 07:00)  SpO2: 96% (22 @ 07:00)  Wt(kg): --    I and O's:     @ 07:01  -   @ 07:00  --------------------------------------------------------  IN: 261.6 mL / OUT: 3200 mL / NET: -2938.4 mL          PHYSICAL EXAM:    Constitutional: NAD  HEENT: PERRLA, EOMI,  MMM  Neck: No LAD, No JVD  Respiratory: CTAB  Cardiovascular: S1 and S2  Gastrointestinal: BS+, soft, NT/ND  Extremities: No peripheral edema  Neurological: A/O x 3, no focal deficits  Psychiatric: Normal mood, normal affect  : No Chandler      LABS:                        11.9   13.84 )-----------( 196      ( 25 Sep 2022 04:45 )             36.0     -    134<L>  |  100  |  38<H>  ----------------------------<  107<H>  4.5   |  24  |  1.00    Ca    9.0      25 Sep 2022 04:45  Phos  2.7       Mg     2.30         TPro  6.3  /  Alb  3.3  /  TBili  2.2<H>  /  DBili  1.4<H>  /  AST  1266<H>  /  ALT  1234<H>  /  AlkPhos  166<H>        Urine Studies:            Assessment    77 yr old male presents with hx of bipolar, BPH, SCC of leg, CHF EF 25% severe segmental LV systolic dysfunction, CAD, hospitalized on May 2022 for acute MI s/p cardiac Cath with no intervention (revealed  RCA and 70% occlusion prox LAD, 1st diagonal 70% stenosis  Right VATs, right upper lobectomy 22  Hypotension sp IVF and sp pressors.  Hypotension from cardiogenic shock leading to BRODY - off pressors, remains on  gtt   Volume status is acceptable  - 3.1L UOP in 24 hours     1 Renal- BRODY is from cariogenic shock at present and he is on  gtt.  resolved   2 CVS-Severe global reduction in EF and on inotropes;   3 Pulm- CXR still shows PVC, CVP was 17 last night     Recommendations  - Wean inotropes as able  - Would hold off on diuretics- Patient is auto-diuresing  - C/w FLuid restriction 1.2L per day     d/w CTU team     Pamela Fung MD  [facility]  Cell: [phone]

## 2022-09-25 NOTE — PROGRESS NOTE ADULT - SUBJECTIVE AND OBJECTIVE BOX
CHIEF COMPLAINT: FOLLOW UP IN ICU FOR POSTOPERATIVE CARE OF PATIENT WHO IS S/P  RUL lobectomy, cardiogenic shock            PROCEDURES:       FB, R VATS, R Upper Lobectomy. 23-Sep-2022         ISSUES:       Cardiogenic shock   Acute postop respiratory failure   CAD, biventricular failure, h/o LV thrombus   Hyperkalemia   Lactic acidosis, acidemic   Encephalopathy, likely cerebral hypoperfusion in shock state, improving   BRODY likely ATN   Lung nodule   Postop pain   Chest tube in place            INTERVAL EVENTS:      Extubated 9/23  Dobutamine decreased to 2.5 this morning however repeat Cvo2 53%, urine output decreased to 40ml/h from 100-150ml/h. Increasing dobutamine back to 4mcg/kg/min. Continuing to monitor Cvo2/lactate/hourly urine output  Chest tube placed to water seal         HISTORY:      Complaining of dizziness when he stands. Pain at site of chest tube, controlled. Good appetite         PHYSICAL EXAM:      Gen: Comfortable, No acute distress     Eyes: Sclera white, Conjunctiva normal, Eyelids normal, Pupils symmetrical      ENT: Mucous membranes moist,  ,  ,       Neck: Trachea midline,  ,  ,  ,  ,  ,       CV: Rate regular, Rhythm regular,  ,  ,       Resp: Breath sounds clear, No accessory muscles use, R chest tube in place,  ,       Abd: Soft, Non-distended, Non-tender, Bowel sounds normal,  ,  ,       Skin: Warm, No peripheral edema of lower extremities,  ,       :  gerardo     Neuro: Moving all 4 extremities,       Psych: A&Ox3               ASSESSMENT AND PLAN:           NEURO:     Neurochecks Q shift  Pain controlled          RESPIRATORY:     Hypoxia - wean o2 maintaining Spo2>92%, bronchodilators, OOb to chair, check CXR.    Chest tube – Pleurevac regulated water seal. Monitor chest tube output.                CARDIOVASCULAR:     Hemodynamically unstable, cardiogenic shock - Dobutamine was decreased to 2.5 however was noted to have Cvo2 53% and decreased urine output. Increased back up to 4, will continue trend hourly urine output/lactate/Cvo2  Restart on betablockers, net negative 3 liters without diuretics  Request for cardiology f/u for biventricular failure    Telemetry (medical test) - Reviewed by me today independently. Normal sinus rhythm.     CAD - Lipitor held. Continue ASA, metoprolol            RENAL:     BRODY improved - trend urine output, BMP            GASTROINTESTINAL:     GI prophylaxis     Zofran and Reglan IV PRN for nausea     Tolerating diet    Shock liver, LFTs down trending        HEMATOLOGIC:     No signs of active bleeding. Monitor Hgb in CBC in AM     DVT prophylaxis with heparin subQ and SCDs. Continue ASA 81            INFECTIOUS DISEASE:     All surgical sites appear clean. No signs of active infection. Will monitor for fever and leukocytosis.           ENDOCRINE:     Stable – Monitor glucose fingersticks for goal 120-180.        ONCOLOGY:     Lung nodule - Improved. S/P resection. Follow up final pathology.         Pertinent clinical, laboratory, radiographic, hemodynamic, echocardiographic, respiratory data, microbiologic data and chart were reviewed by myself and analyzed frequently throughout the course of the day and night by myself.     Plan discussed at length with the CTICU staff and Attending CT Surgeon -   Dr Loni Jones    Patient's status was discussed with patient's wife at bedside.    Critical care time spent 40 mins.     _________________________  VITAL SIGNS:  Vital Signs Last 24 Hrs  T(C): 36.6 (25 Sep 2022 12:00), Max: 36.7 (25 Sep 2022 00:00)  T(F): 97.9 (25 Sep 2022 12:00), Max: 98 (25 Sep 2022 00:00)  HR: 81 (25 Sep 2022 12:00) (79 - 90)  BP: --  BP(mean): --  RR: 22 (25 Sep 2022 12:00) (10 - 25)  SpO2: 100% (25 Sep 2022 12:00) (93% - 100%)    Parameters below as of 25 Sep 2022 12:00  Patient On (Oxygen Delivery Method): nasal cannula w/ humidification  O2 Flow (L/min): 2    I/Os:   I&O's Detail    24 Sep 2022 07:01  -  25 Sep 2022 07:00  --------------------------------------------------------  IN:    DOBUTamine: 261.6 mL  Total IN: 261.6 mL    OUT:    Chest Tube (mL): 400 mL    Indwelling Catheter - Urethral (mL): 2800 mL  Total OUT: 3200 mL    Total NET: -2938.4 mL      25 Sep 2022 07:01  -  25 Sep 2022 13:16  --------------------------------------------------------  IN:    DOBUTamine: 50.7 mL  Total IN: 50.7 mL    OUT:    Chest Tube (mL): 150 mL    Indwelling Catheter - Urethral (mL): 340 mL  Total OUT: 490 mL    Total NET: -439.3 mL              MEDICATIONS:  MEDICATIONS  (STANDING):  albuterol/ipratropium for Nebulization 3 milliLiter(s) Nebulizer every 6 hours  aspirin enteric coated 81 milliGRAM(s) Oral daily  chlorhexidine 4% Liquid 1 Application(s) Topical <User Schedule>  DOBUTamine Infusion 4.5 MICROgram(s)/kG/Min (10.9 mL/Hr) IV Continuous <Continuous>  dornase delilah Solution 2.5 milliGRAM(s) Inhalation two times a day  heparin   Injectable 5000 Unit(s) SubCutaneous every 8 hours  lidocaine   4% Patch 1 Patch Transdermal daily  metoprolol tartrate 12.5 milliGRAM(s) Oral every 12 hours  pantoprazole  Injectable 40 milliGRAM(s) IV Push daily  polyethylene glycol 3350 17 Gram(s) Oral daily  senna 2 Tablet(s) Oral at bedtime  sodium chloride 3%  Inhalation 4 milliLiter(s) Inhalation every 12 hours  tamsulosin 0.4 milliGRAM(s) Oral at bedtime    MEDICATIONS  (PRN):  HYDROmorphone  Injectable 0.25 milliGRAM(s) IV Push every 6 hours PRN Severe Pain (7 - 10)  melatonin 3 milliGRAM(s) Oral at bedtime PRN Insomnia  oxyCODONE    IR 5 milliGRAM(s) Oral every 6 hours PRN Moderate Pain (4 - 6)      LABS:  Laboratory data was independently reviewed by me today.                           11.9   13.84 )-----------( 196      ( 25 Sep 2022 04:45 )             36.0     09-25    134<L>  |  100  |  38<H>  ----------------------------<  107<H>  4.5   |  24  |  1.00    Ca    9.0      25 Sep 2022 04:45  Phos  2.7     09-25  Mg     2.30     09-25    TPro  6.3  /  Alb  3.3  /  TBili  2.2<H>  /  DBili  1.4<H>  /  AST  1266<H>  /  ALT  1234<H>  /  AlkPhos  166<H>  09-25    LIVER FUNCTIONS - ( 25 Sep 2022 04:45 )  Alb: 3.3 g/dL / Pro: 6.3 g/dL / ALK PHOS: 166 U/L / ALT: 1234 U/L / AST: 1266 U/L / GGT: x           PT/INR - ( 25 Sep 2022 04:45 )   PT: 17.4 sec;   INR: 1.49 ratio         PTT - ( 25 Sep 2022 04:45 )  PTT:29.3 sec  ABG - ( 25 Sep 2022 04:45 )  pH, Arterial: 7.45  pH, Blood: x     /  pCO2: 35    /  pO2: 85    / HCO3: 24    / Base Excess: 0.6   /  SaO2: 96.3                  RADIOLOGY:   Radiology images were independently reviewed by me today. Reports were reviewed by me today.    Xray Chest 1 View- PORTABLE-Routine:   ACC: [id number] EXAM:  XR CHEST PORTABLE ROUTINE 1V                          PROCEDURE DATE:  09/24/2022          INTERPRETATION:  CLINICAL INFORMATION: Postop    TIME OF EXAMINATION: September 24 at 6:33 AM    EXAM: Portable chest    FINDINGS:  Right-sided chest tube unchanged. The endotracheal tube has been removed.   Right IJ line in position.    Lungs are clear with no pneumothorax appreciated. Heart size is stable.        COMPARISON: September 23        IMPRESSION: Follow-up right thoracotomy with chest tube post extubation.    --- End of Report ---            SARA KIMBLE MD; Attending Radiologist  This document has been electronically signed. Sep 24 2022 11:03AM (09-24-22 @ 09:35)  Xray Chest 1 View- PORTABLE-Urgent:   ACC: [id number] EXAM:  XR CHEST PORTABLE URGENT 1V                          PROCEDURE DATE:  09/23/2022          INTERPRETATION:  CLINICAL INFORMATION: Postop    TIME OF EXAMINATION: September 23, 2022 at 3:10 PM    EXAM: Portable chest    FINDINGS:  Right upper lobe mass has been surgically removed. Right-sided chest tube   in place with loss of volume in this lung but no consolidation, effusion   or pneumothorax. Left lung is clear. Right mediastinal widening.        COMPARISON: August 25        IMPRESSION: Status post right thoracotomy with chest tube.    --- End of Report ---            SARA KIMBLE MD; Attending Radiologist  This document has been electronically signed. Sep 23 2022  3:49PM (09-23-22 @ 15:22)  Xray Chest 1 View AP/PA:   ACC: [id number] EXAM:  XR CHEST AP OR PA 1V                          PROCEDURE DATE:  09/14/2022          INTERPRETATION:  CLINICAL INFORMATION: Right lung mass    TIME OF EXAMINATION: September 14 at 2:13 PM and 2:14 PM    EXAM: Portable chest    FINDINGS:  Tiny pneumothorax suggested on the radiograph obtained at 2:14 PM.  Right   upper lobe opacity is present. Remainder of the lungs are clear. Heart   size is stable. No pleural effusion.        COMPARISON: August 29, 2022        IMPRESSION: Possible tiny pneumothorax post lung biopsy.    --- End of Report ---            SARA KIMBLE MD; Attending Radiologist  This document has been electronically signed. Sep 14 2022  6:29PM (09-14-22 @ 14:23)

## 2022-09-26 LAB
ALBUMIN SERPL ELPH-MCNC: 3.2 G/DL — LOW (ref 3.3–5)
ALP SERPL-CCNC: 190 U/L — HIGH (ref 40–120)
ALT FLD-CCNC: 872 U/L — HIGH (ref 4–41)
ANION GAP SERPL CALC-SCNC: 10 MMOL/L — SIGNIFICANT CHANGE UP (ref 7–14)
ANION GAP SERPL CALC-SCNC: 10 MMOL/L — SIGNIFICANT CHANGE UP (ref 7–14)
ANION GAP SERPL CALC-SCNC: 7 MMOL/L — SIGNIFICANT CHANGE UP (ref 7–14)
AST SERPL-CCNC: 632 U/L — HIGH (ref 4–40)
BASE EXCESS BLDV CALC-SCNC: 2 MMOL/L — SIGNIFICANT CHANGE UP (ref -2–3)
BASE EXCESS BLDV CALC-SCNC: 2.4 MMOL/L — SIGNIFICANT CHANGE UP (ref -2–3)
BILIRUB DIRECT SERPL-MCNC: 1.4 MG/DL — HIGH (ref 0–0.3)
BILIRUB INDIRECT FLD-MCNC: 0.9 MG/DL — SIGNIFICANT CHANGE UP (ref 0–1)
BILIRUB SERPL-MCNC: 2.3 MG/DL — HIGH (ref 0.2–1.2)
BLOOD GAS VENOUS COMPREHENSIVE RESULT: SIGNIFICANT CHANGE UP
BUN SERPL-MCNC: 24 MG/DL — HIGH (ref 7–23)
CALCIUM SERPL-MCNC: 8.3 MG/DL — LOW (ref 8.4–10.5)
CALCIUM SERPL-MCNC: 8.5 MG/DL — SIGNIFICANT CHANGE UP (ref 8.4–10.5)
CALCIUM SERPL-MCNC: 8.5 MG/DL — SIGNIFICANT CHANGE UP (ref 8.4–10.5)
CHLORIDE BLDV-SCNC: 101 MMOL/L — SIGNIFICANT CHANGE UP (ref 96–108)
CHLORIDE SERPL-SCNC: 101 MMOL/L — SIGNIFICANT CHANGE UP (ref 98–107)
CHLORIDE SERPL-SCNC: 103 MMOL/L — SIGNIFICANT CHANGE UP (ref 98–107)
CHLORIDE SERPL-SCNC: 104 MMOL/L — SIGNIFICANT CHANGE UP (ref 98–107)
CO2 BLDV-SCNC: 27.7 MMOL/L — HIGH (ref 22–26)
CO2 BLDV-SCNC: 28.5 MMOL/L — HIGH (ref 22–26)
CO2 SERPL-SCNC: 24 MMOL/L — SIGNIFICANT CHANGE UP (ref 22–31)
CO2 SERPL-SCNC: 25 MMOL/L — SIGNIFICANT CHANGE UP (ref 22–31)
CO2 SERPL-SCNC: 26 MMOL/L — SIGNIFICANT CHANGE UP (ref 22–31)
CREAT SERPL-MCNC: 0.83 MG/DL — SIGNIFICANT CHANGE UP (ref 0.5–1.3)
CREAT SERPL-MCNC: 0.87 MG/DL — SIGNIFICANT CHANGE UP (ref 0.5–1.3)
CREAT SERPL-MCNC: 1.01 MG/DL — SIGNIFICANT CHANGE UP (ref 0.5–1.3)
EGFR: 77 ML/MIN/1.73M2 — SIGNIFICANT CHANGE UP
EGFR: 89 ML/MIN/1.73M2 — SIGNIFICANT CHANGE UP
EGFR: 90 ML/MIN/1.73M2 — SIGNIFICANT CHANGE UP
GAS PNL BLDV: 134 MMOL/L — LOW (ref 136–145)
GAS PNL BLDV: SIGNIFICANT CHANGE UP
GLUCOSE BLDV-MCNC: 106 MG/DL — HIGH (ref 70–99)
GLUCOSE SERPL-MCNC: 114 MG/DL — HIGH (ref 70–99)
GLUCOSE SERPL-MCNC: 124 MG/DL — HIGH (ref 70–99)
GLUCOSE SERPL-MCNC: 125 MG/DL — HIGH (ref 70–99)
HCO3 BLDV-SCNC: 26 MMOL/L — SIGNIFICANT CHANGE UP (ref 22–29)
HCO3 BLDV-SCNC: 27 MMOL/L — SIGNIFICANT CHANGE UP (ref 22–29)
HCT VFR BLD CALC: 36.9 % — LOW (ref 39–50)
HCT VFR BLDA CALC: 37 % — LOW (ref 39–51)
HGB BLD CALC-MCNC: 12.3 G/DL — LOW (ref 13–17)
HGB BLD-MCNC: 12.2 G/DL — LOW (ref 13–17)
LACTATE BLDV-MCNC: 1.2 MMOL/L — SIGNIFICANT CHANGE UP (ref 0.5–2)
LACTATE BLDV-MCNC: 2.2 MMOL/L — HIGH (ref 0.5–2)
MAGNESIUM SERPL-MCNC: 2.2 MG/DL — SIGNIFICANT CHANGE UP (ref 1.6–2.6)
MCHC RBC-ENTMCNC: 31.4 PG — SIGNIFICANT CHANGE UP (ref 27–34)
MCHC RBC-ENTMCNC: 33.1 GM/DL — SIGNIFICANT CHANGE UP (ref 32–36)
MCV RBC AUTO: 95.1 FL — SIGNIFICANT CHANGE UP (ref 80–100)
NRBC # BLD: 0 /100 WBCS — SIGNIFICANT CHANGE UP (ref 0–0)
NRBC # FLD: 0.02 K/UL — HIGH (ref 0–0)
PCO2 BLDV: 40 MMHG — LOW (ref 42–55)
PCO2 BLDV: 42 MMHG — SIGNIFICANT CHANGE UP (ref 42–55)
PH BLDV: 7.42 — SIGNIFICANT CHANGE UP (ref 7.32–7.43)
PH BLDV: 7.43 — SIGNIFICANT CHANGE UP (ref 7.32–7.43)
PHOSPHATE SERPL-MCNC: 2.5 MG/DL — SIGNIFICANT CHANGE UP (ref 2.5–4.5)
PHOSPHATE SERPL-MCNC: 2.5 MG/DL — SIGNIFICANT CHANGE UP (ref 2.5–4.5)
PHOSPHATE SERPL-MCNC: 2.9 MG/DL — SIGNIFICANT CHANGE UP (ref 2.5–4.5)
PLATELET # BLD AUTO: 199 K/UL — SIGNIFICANT CHANGE UP (ref 150–400)
PO2 BLDV: 38 MMHG — SIGNIFICANT CHANGE UP
PO2 BLDV: 39 MMHG — SIGNIFICANT CHANGE UP
POTASSIUM BLDV-SCNC: 4.4 MMOL/L — SIGNIFICANT CHANGE UP (ref 3.5–5.1)
POTASSIUM SERPL-MCNC: 4.4 MMOL/L — SIGNIFICANT CHANGE UP (ref 3.5–5.3)
POTASSIUM SERPL-MCNC: 4.6 MMOL/L — SIGNIFICANT CHANGE UP (ref 3.5–5.3)
POTASSIUM SERPL-MCNC: 4.8 MMOL/L — SIGNIFICANT CHANGE UP (ref 3.5–5.3)
POTASSIUM SERPL-SCNC: 4.4 MMOL/L — SIGNIFICANT CHANGE UP (ref 3.5–5.3)
POTASSIUM SERPL-SCNC: 4.6 MMOL/L — SIGNIFICANT CHANGE UP (ref 3.5–5.3)
POTASSIUM SERPL-SCNC: 4.8 MMOL/L — SIGNIFICANT CHANGE UP (ref 3.5–5.3)
PROT SERPL-MCNC: 6.1 G/DL — SIGNIFICANT CHANGE UP (ref 6–8.3)
RBC # BLD: 3.88 M/UL — LOW (ref 4.2–5.8)
RBC # FLD: 16.6 % — HIGH (ref 10.3–14.5)
SAO2 % BLDV: 56.6 % — SIGNIFICANT CHANGE UP
SAO2 % BLDV: 67 % — SIGNIFICANT CHANGE UP
SODIUM SERPL-SCNC: 136 MMOL/L — SIGNIFICANT CHANGE UP (ref 135–145)
SODIUM SERPL-SCNC: 136 MMOL/L — SIGNIFICANT CHANGE UP (ref 135–145)
SODIUM SERPL-SCNC: 138 MMOL/L — SIGNIFICANT CHANGE UP (ref 135–145)
WBC # BLD: 10.67 K/UL — HIGH (ref 3.8–10.5)
WBC # FLD AUTO: 10.67 K/UL — HIGH (ref 3.8–10.5)

## 2022-09-26 PROCEDURE — 71045 X-RAY EXAM CHEST 1 VIEW: CPT | Mod: 26

## 2022-09-26 PROCEDURE — 99291 CRITICAL CARE FIRST HOUR: CPT

## 2022-09-26 PROCEDURE — 99233 SBSQ HOSP IP/OBS HIGH 50: CPT

## 2022-09-26 RX ORDER — ZOLPIDEM TARTRATE 10 MG/1
5 TABLET ORAL AT BEDTIME
Refills: 0 | Status: DISCONTINUED | OUTPATIENT
Start: 2022-09-26 | End: 2022-09-30

## 2022-09-26 RX ORDER — ZOLPIDEM TARTRATE 10 MG/1
5 TABLET ORAL AT BEDTIME
Refills: 0 | Status: DISCONTINUED | OUTPATIENT
Start: 2022-09-26 | End: 2022-10-01

## 2022-09-26 RX ORDER — FUROSEMIDE 40 MG
10 TABLET ORAL ONCE
Refills: 0 | Status: COMPLETED | OUTPATIENT
Start: 2022-09-26 | End: 2022-09-26

## 2022-09-26 RX ADMIN — HEPARIN SODIUM 5000 UNIT(S): 5000 INJECTION INTRAVENOUS; SUBCUTANEOUS at 13:13

## 2022-09-26 RX ADMIN — HEPARIN SODIUM 5000 UNIT(S): 5000 INJECTION INTRAVENOUS; SUBCUTANEOUS at 21:43

## 2022-09-26 RX ADMIN — DORNASE ALFA 2.5 MILLIGRAM(S): 1 SOLUTION RESPIRATORY (INHALATION) at 22:02

## 2022-09-26 RX ADMIN — SENNA PLUS 2 TABLET(S): 8.6 TABLET ORAL at 21:43

## 2022-09-26 RX ADMIN — Medication 10.9 MICROGRAM(S)/KG/MIN: at 13:13

## 2022-09-26 RX ADMIN — LIDOCAINE 1 PATCH: 4 CREAM TOPICAL at 13:13

## 2022-09-26 RX ADMIN — Medication 3 MILLIGRAM(S): at 22:50

## 2022-09-26 RX ADMIN — SODIUM CHLORIDE 4 MILLILITER(S): 9 INJECTION INTRAMUSCULAR; INTRAVENOUS; SUBCUTANEOUS at 22:00

## 2022-09-26 RX ADMIN — PANTOPRAZOLE SODIUM 40 MILLIGRAM(S): 20 TABLET, DELAYED RELEASE ORAL at 13:14

## 2022-09-26 RX ADMIN — LIDOCAINE 1 PATCH: 4 CREAM TOPICAL at 18:02

## 2022-09-26 RX ADMIN — Medication 3 MILLILITER(S): at 03:32

## 2022-09-26 RX ADMIN — Medication 3 MILLILITER(S): at 22:02

## 2022-09-26 RX ADMIN — POLYETHYLENE GLYCOL 3350 17 GRAM(S): 17 POWDER, FOR SOLUTION ORAL at 13:15

## 2022-09-26 RX ADMIN — Medication 10 MILLIGRAM(S): at 14:54

## 2022-09-26 RX ADMIN — HEPARIN SODIUM 5000 UNIT(S): 5000 INJECTION INTRAVENOUS; SUBCUTANEOUS at 05:15

## 2022-09-26 RX ADMIN — ZOLPIDEM TARTRATE 5 MILLIGRAM(S): 10 TABLET ORAL at 23:28

## 2022-09-26 RX ADMIN — ZOLPIDEM TARTRATE 5 MILLIGRAM(S): 10 TABLET ORAL at 22:23

## 2022-09-26 RX ADMIN — Medication 12.5 MILLIGRAM(S): at 05:16

## 2022-09-26 RX ADMIN — CHLORHEXIDINE GLUCONATE 1 APPLICATION(S): 213 SOLUTION TOPICAL at 05:15

## 2022-09-26 RX ADMIN — TAMSULOSIN HYDROCHLORIDE 0.4 MILLIGRAM(S): 0.4 CAPSULE ORAL at 21:43

## 2022-09-26 RX ADMIN — Medication 81 MILLIGRAM(S): at 13:14

## 2022-09-26 NOTE — PROGRESS NOTE ADULT - SUBJECTIVE AND OBJECTIVE BOX
ELYSE MALAVE      77y   Male   [id number]         No Known Allergies             Daily     Daily Drug Dosing Weight  Height (cm): 181.6 (23 Sep 2022 05:59)  Weight (kg): 80.7 (23 Sep 2022 05:59)  BMI (kg/m2): 24.5 (23 Sep 2022 05:59)  BSA (m2): 2.02 (23 Sep 2022 05:59)    HPI:  77 yr old male presents with hx of bipolar, BPH, SCC of leg, CHF EF 25% severe segmental LV systolic dysfunction, CAD, hospitalized on May 2022 for acute MI s/p cardiac Cath with no intervention (revealed  RCA and 70% occlusion prox LAD, 1st diagonal 70% stenosis- medical management-no intervention, IABP used for elevated right sided pressures; cardiogenic shock, found to have new 1x 1.3cm LV thrombus on Plavix and Coumadin (Coumadin completed on 08/26/2022, Plavix stopped around same time per pt), c/o one episode of difficulty walking, chest discomfort  and SOB while in the pool, CXR showed RUL nodule, PET/CT showed FDG avid RUL mass    PET/CT on 07/28/2022:  - Mildly FDG avid mixed solid and groundglass right upper lung mass measuring 3.5 x 2 cm with SUV 4.8 (image 82). A 0.3 cm left upper lung nodule (image 75) is too small to characterize.  - Small bilateral pleural effusion not significantly changed from comparison CT with minimal FDG avidity with SUV 2.2.   - Large photopenic 8.4 x 7.7 cm low-attenuation mass along the inferoposterior aspect of the right kidney, compatible with cyst.  - Nonspecific mildly FDG avid low-attenuation left adrenal nodule, possibly an adenoma. This may be further evaluated with contrast-enhanced CT or MRI.    Patient reports stopping coumadin 2 weeks ago, echo 8/25/22 in chart    Scheduled for RUL mass biopsy on 9/14/22 and followed by right VATs, right upper lobectomy 9/22/22  Patient denies any claudication, chest pain or pressure, SOB, walked for 30 minutes yesterday without s/s (06 Sep 2022 10:56)      CHIEF COMPLAINT: Follow up in ICU  for postoperative care of patient who is s/p     Procedure:  FB, R VATS, R Upper Lobectomy. 23-Sep-2022                       Issues:              Cardiogenic shock  CAD,   Biventricular failure, h/o LV thrombus  Lung nodule  Postop pain  Chest tube in place  BPH  Bipolar disorder / Depression / Anxiety  Depression      Postop course:     Patient reports moderate pain at chest wall incision sites which is worse with coughing and deep breathing without associated fever or dyspnea. Pain is improved with use of PCA and  oral pain meds.         Home Medications:  Flomax 0.4 mg oral capsule: 1 cap(s) orally once a day (23 Sep 2022 06:35)  METOPROLOL SUCC ER 25 MG TAB: 1 tab(s) orally once a day (23 Sep 2022 06:35)  mirtazapine 7.5 mg oral tablet: 1 tab(s) orally once a day (at bedtime) (23 Sep 2022 06:35)  warfarin 4 mg oral tablet: 1 tab(s) orally once a day (23 Sep 2022 06:35)    PAST MEDICAL & SURGICAL HISTORY:  BPH (benign prostatic hyperplasia)      Bipolar disorder      Depression      Anxiety      Umbilical hernia, incarcerated      Inguinal hernia of right side without obstruction or gangrene      Depression      Constipation      Urinary retention      CAD (coronary artery disease)      SCC (squamous cell carcinoma)      Lung mass      Other nonspecific abnormal finding of lung field      LV (left ventricular) mural thrombus      History of inguinal hernia      Severe left ventricular systolic dysfunction      History of CHF (congestive heart failure)      History of arthroscopy of knee  Right, 1987      History of tonsillectomy  1952      History of hernia repair      H/O coronary angiogram        Vital Signs Last 24 Hrs  T(C): 36.7 (26 Sep 2022 08:00), Max: 36.8 (25 Sep 2022 16:00)  T(F): 98.1 (26 Sep 2022 08:00), Max: 98.2 (25 Sep 2022 16:00)  HR: 88 (26 Sep 2022 09:00) (70 - 95)  BP: --  BP(mean): --  RR: 19 (26 Sep 2022 09:00) (12 - 27)  SpO2: 100% (26 Sep 2022 09:00) (96% - 100%)    Parameters below as of 26 Sep 2022 09:00  Patient On (Oxygen Delivery Method): nasal cannula w/ humidification  O2 Flow (L/min): 2    I&O's Detail    25 Sep 2022 07:01  -  26 Sep 2022 07:00  --------------------------------------------------------  IN:    DOBUTamine: 255.9 mL    Oral Fluid: 480 mL  Total IN: 735.9 mL    OUT:    Chest Tube (mL): 300 mL    Indwelling Catheter - Urethral (mL): 2540 mL  Total OUT: 2840 mL    Total NET: -2104.1 mL      26 Sep 2022 07:01  -  26 Sep 2022 09:32  --------------------------------------------------------  IN:    DOBUTamine: 21.8 mL    Oral Fluid: 240 mL  Total IN: 261.8 mL    OUT:    Chest Tube (mL): 50 mL    Indwelling Catheter - Urethral (mL): 150 mL  Total OUT: 200 mL    Total NET: 61.8 mL        CAPILLARY BLOOD GLUCOSE        Home Medications:  Flomax 0.4 mg oral capsule: 1 cap(s) orally once a day (23 Sep 2022 06:35)  METOPROLOL SUCC ER 25 MG TAB: 1 tab(s) orally once a day (23 Sep 2022 06:35)  mirtazapine 7.5 mg oral tablet: 1 tab(s) orally once a day (at bedtime) (23 Sep 2022 06:35)  warfarin 4 mg oral tablet: 1 tab(s) orally once a day (23 Sep 2022 06:35)    MEDICATIONS  (STANDING):  albuterol/ipratropium for Nebulization 3 milliLiter(s) Nebulizer every 6 hours  aspirin enteric coated 81 milliGRAM(s) Oral daily  chlorhexidine 4% Liquid 1 Application(s) Topical <User Schedule>  DOBUTamine Infusion 4.5 MICROgram(s)/kG/Min (10.9 mL/Hr) IV Continuous <Continuous>  dornase delilah Solution 2.5 milliGRAM(s) Inhalation two times a day  heparin   Injectable 5000 Unit(s) SubCutaneous every 8 hours  lidocaine   4% Patch 1 Patch Transdermal daily  pantoprazole  Injectable 40 milliGRAM(s) IV Push daily  polyethylene glycol 3350 17 Gram(s) Oral daily  senna 2 Tablet(s) Oral at bedtime  sodium chloride 3%  Inhalation 4 milliLiter(s) Inhalation every 12 hours  tamsulosin 0.4 milliGRAM(s) Oral at bedtime    MEDICATIONS  (PRN):  HYDROmorphone  Injectable 0.25 milliGRAM(s) IV Push every 6 hours PRN Severe Pain (7 - 10)  melatonin 3 milliGRAM(s) Oral at bedtime PRN Insomnia  oxyCODONE    IR 5 milliGRAM(s) Oral every 6 hours PRN Moderate Pain (4 - 6)  zolpidem 5 milliGRAM(s) Oral at bedtime PRN Insomnia        Physical exam:                             General:               Pt is awake, alert,  appears to be in pain but not in distress                                                  Neuro:                  Nonfocal                             Psych:                   A&Ox3                          Cardiovascular:   S1 & S2, regular                           Respiratory:         Air entry is fair and equal on both sides, has bilateral conducted sounds                           GI:                          Soft, nondistended and nontender, Bowel sounds active                            Ext:                        No cyanosis or edema     Labs:                                                                           12.2   10.67 )-----------( 199      ( 26 Sep 2022 03:25 )             36.9             09-26    136  |  103  |  24<H>  ----------------------------<  114<H>  4.4   |  26  |  0.83    Ca    8.5      26 Sep 2022 03:25  Phos  2.5     09-26  Mg     2.20     09-26    TPro  6.1  /  Alb  3.2<L>  /  TBili  2.3<H>  /  DBili  1.4<H>  /  AST  632<H>  /  ALT  872<H>  /  AlkPhos  190<H>  09-26                  PT/INR - ( 25 Sep 2022 04:45 )   PT: 17.4 sec;   INR: 1.49 ratio         PTT - ( 25 Sep 2022 04:45 )  PTT:29.3 sec  LIVER FUNCTIONS - ( 26 Sep 2022 03:25 )  Alb: 3.2 g/dL / Pro: 6.1 g/dL / ALK PHOS: 190 U/L / ALT: 872 U/L / AST: 632 U/L / GGT: x                 CXR:  < from: Xray Chest 1 View- PORTABLE-Urgent (Xray Chest 1 View- PORTABLE-Urgent .) (09.25.22 @ 15:11) >  Right-sided chest tube in place. No interval change after chest tube   clamping.        COMPARISON: September 25 at 5:12 AM        IMPRESSION: Follow-up without change post chest tube clamping.      Plan:  General: 77yMale s/p FB, R VATS, R Upper Lobectomy. 23-Sep-2022, experiencing  pain with deep breathing.                             Neuro:                                         Pain control with  Dilaudid /  Tylenol / Oxy  PRN    Bipolar disorder / Anxiety / Depression: Continue mirtazapine                             Cardiovascular:      Cardiogenic shock: On Dobutamine 5mcg  Clinically pt is not showing signs of decompensation  2D Echo on 9/24:  Severe global left ventricular systolic dysfunction, Moderate diastolic dysfunction (Stage II), Right ventricular enlargement with normal right ventricular systolic function  D/C Lopressor while on Dobutamine  Wean off Dobutamine and check BMP / Lactate   Continue gentle diuresis and monitor renal function  Cardiology f/u                                        Telemetry (medical test) - Reviewed by me today independently. Normal sinus rhythm /  with some PVCs .                                          Continue hemodynamic monitoring to prevent decompensation.                            Respiratory:                                         Postop hypoxemia requiring O2 via nasal cannula probably due to postop pain - Wean nasal cannula for goal O2sat above 92%.                                              Obtain CXR . Encourage incentive spirometry.                                                   Chest PT and frequent suctioning. Continue bronchodilators, Pulmozyme and inhaled 3% saline inhalations.                                                      OOB to chair & ambulate w/ assistance.                                                           Continuous pulse oximetry for support & to prevent decompensation.                                         Monitor chest tube output                                         Chest tube to  water seal                                                                                           GI                                         On DASH  diet as tolerated                                         Continue Zofran / Reglan for nausea - PRN                                         Continue bowel regimen     Shock liver: LFTs are trending down	                                                                 Renal:                                                                                  Monitor I/Os and electrolytes     Continue gentle diuresis     BPH: Continue Flomax                                                                                        Hem/ Onc:                                         DVT prophylaxis with SQ Heparin and SCDs                                         Monitor chest tube output &  signs of bleeding.                                          Follow CBC in AM                           Infectious disease:                                            Monitor for fever / leukocytosis.                                          All surgical incision / chest tube  sites look clean                            Endocrine                                             Continue Accu-Checks with coverage                                                 Pertinent clinical, laboratory, radiographic, hemodynamic, echocardiographic, respiratory data, microbiologic data and chart were reviewed and analyzed frequently throughout the course of the day and night.     Patient seen, examined and plan discussed with CT Surgeon Dr. Padilla / CTICU team during rounds.     Status discussed with patient and updated plan of care.     I have spent  40 minutes of critical care time with this pt between  7am and 11.59pm monitoring hemodynamic status, managing inotrope to prevent  worsening of shock.             Jacoby Carmona MD                                                                     ELYSE MALAVE      77y   Male   [id number]         No Known Allergies             Daily     Daily Drug Dosing Weight  Height (cm): 181.6 (23 Sep 2022 05:59)  Weight (kg): 80.7 (23 Sep 2022 05:59)  BMI (kg/m2): 24.5 (23 Sep 2022 05:59)  BSA (m2): 2.02 (23 Sep 2022 05:59)    HPI:  77 yr old male presents with hx of bipolar, BPH, SCC of leg, CHF EF 25% severe segmental LV systolic dysfunction, CAD, hospitalized on May 2022 for acute MI s/p cardiac Cath with no intervention (revealed  RCA and 70% occlusion prox LAD, 1st diagonal 70% stenosis- medical management-no intervention, IABP used for elevated right sided pressures; cardiogenic shock, found to have new 1x 1.3cm LV thrombus on Plavix and Coumadin (Coumadin completed on 08/26/2022, Plavix stopped around same time per pt), c/o one episode of difficulty walking, chest discomfort  and SOB while in the pool, CXR showed RUL nodule, PET/CT showed FDG avid RUL mass    PET/CT on 07/28/2022:  - Mildly FDG avid mixed solid and groundglass right upper lung mass measuring 3.5 x 2 cm with SUV 4.8 (image 82). A 0.3 cm left upper lung nodule (image 75) is too small to characterize.  - Small bilateral pleural effusion not significantly changed from comparison CT with minimal FDG avidity with SUV 2.2.   - Large photopenic 8.4 x 7.7 cm low-attenuation mass along the inferoposterior aspect of the right kidney, compatible with cyst.  - Nonspecific mildly FDG avid low-attenuation left adrenal nodule, possibly an adenoma. This may be further evaluated with contrast-enhanced CT or MRI.    Patient reports stopping coumadin 2 weeks ago, echo 8/25/22 in chart    Scheduled for RUL mass biopsy on 9/14/22 and followed by right VATs, right upper lobectomy 9/22/22  Patient denies any claudication, chest pain or pressure, SOB, walked for 30 minutes yesterday without s/s (06 Sep 2022 10:56)      CHIEF COMPLAINT: Follow up in ICU  for postoperative care of patient who is s/p R Upper Lobectomy and acute on chronic heart failure with shock.    Perioperative events were significant for acute cardiac decompensation with shock requiring pressor and inotropic support. Pt's respiratory status, renal function, metabolic status improved since surgery and is currently requiring inotropic support only.     Procedure:  FB, R VATS, R Upper Lobectomy. 23-Sep-2022                       Issues:              Cardiogenic shock  CAD,   Biventricular failure, h/o LV thrombus  Lung nodule  Postop pain  Chest tube in place  BPH  Bipolar disorder / Depression / Anxiety  Depression    Drips:  Dobutamine 5mcg    Postop course:     Patient reports moderate pain at chest wall incision sites which is worse with coughing and deep breathing without associated fever or dyspnea. Pain is improved with use of oral pain meds.         Home Medications:  Flomax 0.4 mg oral capsule: 1 cap(s) orally once a day (23 Sep 2022 06:35)  METOPROLOL SUCC ER 25 MG TAB: 1 tab(s) orally once a day (23 Sep 2022 06:35)  mirtazapine 7.5 mg oral tablet: 1 tab(s) orally once a day (at bedtime) (23 Sep 2022 06:35)  warfarin 4 mg oral tablet: 1 tab(s) orally once a day (23 Sep 2022 06:35)    PAST MEDICAL & SURGICAL HISTORY:  BPH (benign prostatic hyperplasia)    Bipolar disorder    Depression    Anxiety    Umbilical hernia, incarcerated    Inguinal hernia of right side without obstruction or gangrene    Depression    Constipation    Urinary retention    CAD (coronary artery disease)    SCC (squamous cell carcinoma)    Lung mass    Other nonspecific abnormal finding of lung field    LV (left ventricular) mural thrombus    History of inguinal hernia    Severe left ventricular systolic dysfunction    History of CHF (congestive heart failure)    History of arthroscopy of knee  Right, 1987      History of tonsillectomy  1952      History of hernia repair      H/O coronary angiogram        Vital Signs Last 24 Hrs  T(C): 36.7 (26 Sep 2022 08:00), Max: 36.8 (25 Sep 2022 16:00)  T(F): 98.1 (26 Sep 2022 08:00), Max: 98.2 (25 Sep 2022 16:00)  HR: 88 (26 Sep 2022 09:00) (70 - 95)  BP: --  BP(mean): --  RR: 19 (26 Sep 2022 09:00) (12 - 27)  SpO2: 100% (26 Sep 2022 09:00) (96% - 100%)    Parameters below as of 26 Sep 2022 09:00  Patient On (Oxygen Delivery Method): nasal cannula w/ humidification  O2 Flow (L/min): 2    I&O's Detail    25 Sep 2022 07:01  -  26 Sep 2022 07:00  --------------------------------------------------------  IN:    DOBUTamine: 255.9 mL    Oral Fluid: 480 mL  Total IN: 735.9 mL    OUT:    Chest Tube (mL): 300 mL    Indwelling Catheter - Urethral (mL): 2540 mL  Total OUT: 2840 mL    Total NET: -2104.1 mL      26 Sep 2022 07:01  -  26 Sep 2022 09:32  --------------------------------------------------------  IN:    DOBUTamine: 21.8 mL    Oral Fluid: 240 mL  Total IN: 261.8 mL    OUT:    Chest Tube (mL): 50 mL    Indwelling Catheter - Urethral (mL): 150 mL  Total OUT: 200 mL    Total NET: 61.8 mL        CAPILLARY BLOOD GLUCOSE        Home Medications:  Flomax 0.4 mg oral capsule: 1 cap(s) orally once a day (23 Sep 2022 06:35)  METOPROLOL SUCC ER 25 MG TAB: 1 tab(s) orally once a day (23 Sep 2022 06:35)  mirtazapine 7.5 mg oral tablet: 1 tab(s) orally once a day (at bedtime) (23 Sep 2022 06:35)  warfarin 4 mg oral tablet: 1 tab(s) orally once a day (23 Sep 2022 06:35)    MEDICATIONS  (STANDING):  albuterol/ipratropium for Nebulization 3 milliLiter(s) Nebulizer every 6 hours  aspirin enteric coated 81 milliGRAM(s) Oral daily  chlorhexidine 4% Liquid 1 Application(s) Topical <User Schedule>  DOBUTamine Infusion 4.5 MICROgram(s)/kG/Min (10.9 mL/Hr) IV Continuous <Continuous>  dornase delilah Solution 2.5 milliGRAM(s) Inhalation two times a day  heparin   Injectable 5000 Unit(s) SubCutaneous every 8 hours  lidocaine   4% Patch 1 Patch Transdermal daily  pantoprazole  Injectable 40 milliGRAM(s) IV Push daily  polyethylene glycol 3350 17 Gram(s) Oral daily  senna 2 Tablet(s) Oral at bedtime  sodium chloride 3%  Inhalation 4 milliLiter(s) Inhalation every 12 hours  tamsulosin 0.4 milliGRAM(s) Oral at bedtime    MEDICATIONS  (PRN):  HYDROmorphone  Injectable 0.25 milliGRAM(s) IV Push every 6 hours PRN Severe Pain (7 - 10)  melatonin 3 milliGRAM(s) Oral at bedtime PRN Insomnia  oxyCODONE    IR 5 milliGRAM(s) Oral every 6 hours PRN Moderate Pain (4 - 6)  zolpidem 5 milliGRAM(s) Oral at bedtime PRN Insomnia        Physical exam:                             General:               Pt is awake, alert,  appears to be in pain but not in distress                                                  Neuro:                  Nonfocal                             Psych:                   A&Ox3                          Cardiovascular:   S1 & S2, regular                           Respiratory:         Air entry is fair and equal on both sides, has bilateral conducted sounds                           GI:                          Soft, nondistended and nontender, Bowel sounds active                            Ext:                        No cyanosis or edema     Labs:                                                                           12.2   10.67 )-----------( 199      ( 26 Sep 2022 03:25 )             36.9             09-26    136  |  103  |  24<H>  ----------------------------<  114<H>  4.4   |  26  |  0.83    Ca    8.5      26 Sep 2022 03:25  Phos  2.5     09-26  Mg     2.20     09-26    TPro  6.1  /  Alb  3.2<L>  /  TBili  2.3<H>  /  DBili  1.4<H>  /  AST  632<H>  /  ALT  872<H>  /  AlkPhos  190<H>  09-26                  PT/INR - ( 25 Sep 2022 04:45 )   PT: 17.4 sec;   INR: 1.49 ratio         PTT - ( 25 Sep 2022 04:45 )  PTT:29.3 sec  LIVER FUNCTIONS - ( 26 Sep 2022 03:25 )  Alb: 3.2 g/dL / Pro: 6.1 g/dL / ALK PHOS: 190 U/L / ALT: 872 U/L / AST: 632 U/L / GGT: x                 CXR:  < from: Xray Chest 1 View- PORTABLE-Urgent (Xray Chest 1 View- PORTABLE-Urgent .) (09.25.22 @ 15:11) >  Right-sided chest tube in place. No interval change after chest tube   clamping.        COMPARISON: September 25 at 5:12 AM        IMPRESSION: Follow-up without change post chest tube clamping.      Plan:  General: 77yMale s/p FB, R VATS, R Upper Lobectomy. 23-Sep-2022, experiencing  pain with deep breathing.                             Neuro:                                         Pain control with  Dilaudid /  Tylenol / Oxy  PRN    Bipolar disorder / Anxiety / Depression: Continue mirtazapine                             Cardiovascular:      Cardiogenic shock: On Dobutamine 5mcg  Clinically pt is not showing signs of decompensation  2D Echo on 9/24:  Severe global left ventricular systolic dysfunction, Moderate diastolic dysfunction (Stage II), Right ventricular enlargement with normal right ventricular systolic function  D/C Lopressor while on Dobutamine  Wean off Dobutamine and check BMP / Lactate   Continue gentle diuresis and monitor renal function  Cardiology f/u                                          CAD: Continue ASA 81mg. Hold BB  HLD: Lipitor on hold because of shock liver. Trend LFTs.  Telemetry (medical test) - Reviewed by me today independently. Normal sinus rhythm /  with some PVCs .                                          Continue hemodynamic monitoring to prevent decompensation.                            Respiratory:                                         Postop hypoxemia requiring O2 via nasal cannula probably due to postop pain - Wean nasal cannula for goal O2sat above 92%.                                              Obtain CXR . Encourage incentive spirometry.                                                   Chest PT and frequent suctioning. Continue bronchodilators, Pulmozyme and inhaled 3% saline inhalations.                                                      OOB to chair & ambulate w/ assistance.                                                           Continuous pulse oximetry for support & to prevent decompensation.                                         Monitor chest tube output                                         Chest tube to  water seal                                                                                           GI                                         On DASH  diet as tolerated                                         Continue Zofran / Reglan for nausea - PRN                                         Continue bowel regimen     Shock liver: LFTs are trending down	                                                                 Renal:                                                                                  Monitor I/Os and electrolytes     Continue gentle diuresis     BPH: Continue Flomax                                                                                        Hem/ Onc:                                         DVT prophylaxis with SQ Heparin and SCDs                                         Monitor chest tube output &  signs of bleeding.                                          Follow CBC in AM                           Infectious disease:                                            Monitor for fever / leukocytosis.                                          All surgical incision / chest tube  sites look clean                            Endocrine                                             Continue Accu-Checks with coverage                                                 Pertinent clinical, laboratory, radiographic, hemodynamic, echocardiographic, respiratory data, microbiologic data and chart were reviewed and analyzed frequently throughout the course of the day and night.     Patient seen, examined and plan discussed with CT Surgeon Dr. Padilla / CTICU team during rounds.     Status discussed with patient and updated plan of care.     I have spent  40 minutes of critical care time with this pt between  7am and 11.59pm monitoring hemodynamic status, managing inotrope to prevent  worsening of shock.             Jacoby Carmona MD

## 2022-09-26 NOTE — PROGRESS NOTE ADULT - SUBJECTIVE AND OBJECTIVE BOX
Overnight Events: None     Review Of Systems: No chest pain or palpitations. Reports some shortness of breath after walking around the unit.           Current Meds:  albuterol/ipratropium for Nebulization 3 milliLiter(s) Nebulizer every 6 hours  aspirin enteric coated 81 milliGRAM(s) Oral daily  chlorhexidine 4% Liquid 1 Application(s) Topical <User Schedule>  DOBUTamine Infusion 4.5 MICROgram(s)/kG/Min IV Continuous <Continuous>  dornase delilah Solution 2.5 milliGRAM(s) Inhalation two times a day  furosemide   Injectable 10 milliGRAM(s) IV Push once  heparin   Injectable 5000 Unit(s) SubCutaneous every 8 hours  HYDROmorphone  Injectable 0.25 milliGRAM(s) IV Push every 6 hours PRN  lidocaine   4% Patch 1 Patch Transdermal daily  melatonin 3 milliGRAM(s) Oral at bedtime PRN  oxyCODONE    IR 5 milliGRAM(s) Oral every 6 hours PRN  pantoprazole  Injectable 40 milliGRAM(s) IV Push daily  polyethylene glycol 3350 17 Gram(s) Oral daily  senna 2 Tablet(s) Oral at bedtime  sodium chloride 3%  Inhalation 4 milliLiter(s) Inhalation every 12 hours  tamsulosin 0.4 milliGRAM(s) Oral at bedtime  zolpidem 5 milliGRAM(s) Oral at bedtime PRN      Vitals:  T(F): 98.2 (09-26), Max: 98.2 (09-25)  HR: 83 (09-26) (70 - 95)  BP: --  RR: 19 (09-26)  SpO2: 100% (09-26)  I&O's Summary    25 Sep 2022 07:01  -  26 Sep 2022 07:00  --------------------------------------------------------  IN: 735.9 mL / OUT: 2840 mL / NET: -2104.1 mL    26 Sep 2022 07:01  -  26 Sep 2022 14:44  --------------------------------------------------------  IN: 545.5 mL / OUT: 760 mL / NET: -214.5 mL        Physical Exam:  Appearance: Mild respiratory distress, seen after walking the unit; well appearing  Eyes: pink conjunctiva  HEENT: Normal oral mucosa  Cardiovascular: RRR, S1, S2, no murmurs, rubs, or gallops; no edema; no JVD  Respiratory: Clear to auscultation bilaterally  Gastrointestinal: soft, non-tender, non-distended   Musculoskeletal: No clubbing; no joint deformity   Neurologic: Normal speech   Psychiatry: AAOx3, mood & affect appropriate  Skin: No rashes, ecchymoses, or cyanosis                          12.2   10.67 )-----------( 199      ( 26 Sep 2022 03:25 )             36.9     09-26    138  |  104  |  24<H>  ----------------------------<  125<H>  4.8   |  24  |  0.87    Ca    8.3<L>      26 Sep 2022 13:01  Phos  2.9     09-26  Mg     2.20     09-26    TPro  6.1  /  Alb  3.2<L>  /  TBili  2.3<H>  /  DBili  1.4<H>  /  AST  632<H>  /  ALT  872<H>  /  AlkPhos  190<H>  09-26    PT/INR - ( 25 Sep 2022 04:45 )   PT: 17.4 sec;   INR: 1.49 ratio         PTT - ( 25 Sep 2022 04:45 )  PTT:29.3 sec  CARDIAC MARKERS ( 24 Sep 2022 03:10 )  44 ng/L / x     / x     / 642 U/L / x     / 6.0 ng/mL  CARDIAC MARKERS ( 23 Sep 2022 13:40 )  42 ng/L / x     / x     / x     / x     / 6.0 ng/mL        Interpretation of Telemetry: Frequent PVCs

## 2022-09-26 NOTE — PROGRESS NOTE ADULT - SUBJECTIVE AND OBJECTIVE BOX
[facility] [phone]        Patient seen and examined on 2L nasal cannula no new complaints remains on  gtt.         MEDICATIONS  (STANDING):  albuterol/ipratropium for Nebulization 3 milliLiter(s) Nebulizer every 6 hours  aspirin enteric coated 81 milliGRAM(s) Oral daily  chlorhexidine 4% Liquid 1 Application(s) Topical <User Schedule>  DOBUTamine Infusion 4.5 MICROgram(s)/kG/Min (10.9 mL/Hr) IV Continuous <Continuous>  dornase delilah Solution 2.5 milliGRAM(s) Inhalation two times a day  heparin   Injectable 5000 Unit(s) SubCutaneous every 8 hours  lidocaine   4% Patch 1 Patch Transdermal daily  pantoprazole  Injectable 40 milliGRAM(s) IV Push daily  polyethylene glycol 3350 17 Gram(s) Oral daily  senna 2 Tablet(s) Oral at bedtime  sodium chloride 3%  Inhalation 4 milliLiter(s) Inhalation every 12 hours  tamsulosin 0.4 milliGRAM(s) Oral at bedtime      VITAL:  T(C): , Max: 36.8 (22 @ 16:00)  T(F): , Max: 98.2 (22 @ 16:00)  HR: 83 (22 @ 12:00)  BP: --  BP(mean): --  RR: 18 (22 @ 12:00)  SpO2: 100% (22 @ 12:00)  Wt(kg): --    I and O's:     @ :  -   @ 07:00  --------------------------------------------------------  IN: 735.9 mL / OUT: 2840 mL / NET: -2104.1 mL     @ 07:  -   @ 13:47  --------------------------------------------------------  IN: 278.8 mL / OUT: 460 mL / NET: -181.2 mL          PHYSICAL EXAM:    Constitutional: NAD  Chest: R CT   Neck:  No JVD  Respiratory: CTAB/L  Cardiovascular: S1 and S2  Gastrointestinal: BS+, soft, NT/ND  Extremities: No peripheral edema  Neurological: A/O x 3, no focal deficits  Psychiatric: Normal mood, normal affect  : +Chandler  Skin: No rashes  Access: Not applicable    LABS:                        12.2   10.67 )-----------( 199      ( 26 Sep 2022 03:25 )             36.9         138  |  104  |  24<H>  ----------------------------<  125<H>  4.8   |  24  |  0.87    Ca    8.3<L>      26 Sep 2022 13:01  Phos  2.9       Mg     2.20         TPro  6.1  /  Alb  3.2<L>  /  TBili  2.3<H>  /  DBili  1.4<H>  /  AST  632<H>  /  ALT  872<H>  /  AlkPhos  190<H>          RADIOLOGY & ADDITIONAL STUDIES:    < from: Xray Chest 1 View- PORTABLE-Routine (Xray Chest 1 View- PORTABLE-Routine in AM.) (22 @ 05:41) >  Frontal expiratory view of the chest shows the heart to be similar in   size. Right chest tube and right jugular catheter remain present.    The lungs show slight progression of small right pneumothorax and there   is no evidence of pleural effusion.    IMPRESSION:  Right pneumothorax.        Thank you for the courtesy of this referral.    --- End of Report ---      < end of copied text >

## 2022-09-26 NOTE — PROGRESS NOTE ADULT - NS ATTEND AMEND GEN_ALL_CORE FT
Renal attd    -Maintain negative outpt at present and can use loop diuretics to achieve this goal  -Creatinine improving nicely   -Wean  gtt  -Eventual Toprol and possibly Entresto once off the inotropes    Sayed [facility]   [phone]

## 2022-09-26 NOTE — PROGRESS NOTE ADULT - ASSESSMENT
The patient is a 77yoM presenting to the [facility] for lung surgery. Cardiology was consulted for a post-op event concerning for ACS.     Recommendations:   - Patient with 100% RCA , 70% D1, 70% LAD lesion noted on Cath from May 2022 when her presented for a STEMI which was managed medically after viability testing indicated mostly infarcted tissue.  - HFrEF ischemic etiology with severe Bi-ventricular failure at baseline.   - This patient has severe bi-ventricular failure making him high risk for poorly tolerating vasopressors and large volume shifts.   -Would avoid use of propofol for sedation given risk of negative inotropy and hypotension  -Recommend goal CVP of ~10 for volume resuscitation given his severe biventricular failure.   -Tele  -Strict I&O    Natalie Nino MD   Fellow     Recommendations are preliminary until cosigned by the attending

## 2022-09-26 NOTE — PROGRESS NOTE ADULT - ASSESSMENT
Assessment    77 yr old male presents with hx of bipolar, BPH, SCC of leg, CHF EF 25% severe segmental LV systolic dysfunction, CAD, hospitalized on May 2022 for acute MI s/p cardiac Cath with no intervention (revealed  RCA and 70% occlusion prox LAD, 1st diagonal 70% stenosis  Right VATs, right upper lobectomy 22  Hypotension sp IVF and sp pressors.  Hypotension from cardiogenic shock leading to BRODY - off pressors, remains on  gtt   Volume status is acceptable  - 2.5L UOP in 24 hours     1 Renal- BRODY is from cariogenic shock at present and he is on  gtt.  resolved   2 CVS-Severe global reduction in EF and on inotropes   3 Pulm- CXR improving, no effusions     Recommendations  - Wean inotropes as able  - Would hold off on diuretics- Patient is auto-diuresing  - C/w fluid restriction 1.2L per day       Ching Aranda NP  [facility]- Nephrology  Cell: [phone]

## 2022-09-27 DIAGNOSIS — I50.23 ACUTE ON CHRONIC SYSTOLIC (CONGESTIVE) HEART FAILURE: ICD-10-CM

## 2022-09-27 LAB
ALBUMIN SERPL ELPH-MCNC: 2.9 G/DL — LOW (ref 3.3–5)
ALP SERPL-CCNC: 203 U/L — HIGH (ref 40–120)
ALT FLD-CCNC: 652 U/L — HIGH (ref 4–41)
ANION GAP SERPL CALC-SCNC: 9 MMOL/L — SIGNIFICANT CHANGE UP (ref 7–14)
AST SERPL-CCNC: 351 U/L — HIGH (ref 4–40)
BASE EXCESS BLDV CALC-SCNC: 1.2 MMOL/L — SIGNIFICANT CHANGE UP (ref -2–3)
BILIRUB SERPL-MCNC: 2.2 MG/DL — HIGH (ref 0.2–1.2)
BLOOD GAS ARTERIAL COMPREHENSIVE RESULT: SIGNIFICANT CHANGE UP
BLOOD GAS VENOUS COMPREHENSIVE RESULT: SIGNIFICANT CHANGE UP
BUN SERPL-MCNC: 21 MG/DL — SIGNIFICANT CHANGE UP (ref 7–23)
CALCIUM SERPL-MCNC: 8.4 MG/DL — SIGNIFICANT CHANGE UP (ref 8.4–10.5)
CHLORIDE BLDV-SCNC: 100 MMOL/L — SIGNIFICANT CHANGE UP (ref 96–108)
CHLORIDE SERPL-SCNC: 105 MMOL/L — SIGNIFICANT CHANGE UP (ref 98–107)
CO2 BLDV-SCNC: 27.3 MMOL/L — HIGH (ref 22–26)
CO2 SERPL-SCNC: 23 MMOL/L — SIGNIFICANT CHANGE UP (ref 22–31)
CREAT SERPL-MCNC: 0.87 MG/DL — SIGNIFICANT CHANGE UP (ref 0.5–1.3)
EGFR: 89 ML/MIN/1.73M2 — SIGNIFICANT CHANGE UP
GAS PNL BLDV: 132 MMOL/L — LOW (ref 136–145)
GLUCOSE BLDV-MCNC: 160 MG/DL — HIGH (ref 70–99)
GLUCOSE SERPL-MCNC: 105 MG/DL — HIGH (ref 70–99)
HCO3 BLDV-SCNC: 26 MMOL/L — SIGNIFICANT CHANGE UP (ref 22–29)
HCT VFR BLD CALC: 36 % — LOW (ref 39–50)
HCT VFR BLDA CALC: 37 % — LOW (ref 39–51)
HGB BLD CALC-MCNC: 12.4 G/DL — LOW (ref 13–17)
HGB BLD-MCNC: 11.7 G/DL — LOW (ref 13–17)
LACTATE BLDV-MCNC: 1.9 MMOL/L — SIGNIFICANT CHANGE UP (ref 0.5–2)
MAGNESIUM SERPL-MCNC: 2 MG/DL — SIGNIFICANT CHANGE UP (ref 1.6–2.6)
MCHC RBC-ENTMCNC: 30.4 PG — SIGNIFICANT CHANGE UP (ref 27–34)
MCHC RBC-ENTMCNC: 32.5 GM/DL — SIGNIFICANT CHANGE UP (ref 32–36)
MCV RBC AUTO: 93.5 FL — SIGNIFICANT CHANGE UP (ref 80–100)
NON-GYNECOLOGICAL CYTOLOGY STUDY: SIGNIFICANT CHANGE UP
NRBC # BLD: 0 /100 WBCS — SIGNIFICANT CHANGE UP (ref 0–0)
NRBC # FLD: 0 K/UL — SIGNIFICANT CHANGE UP (ref 0–0)
PCO2 BLDV: 41 MMHG — LOW (ref 42–55)
PH BLDV: 7.41 — SIGNIFICANT CHANGE UP (ref 7.32–7.43)
PHOSPHATE SERPL-MCNC: 3 MG/DL — SIGNIFICANT CHANGE UP (ref 2.5–4.5)
PLATELET # BLD AUTO: 211 K/UL — SIGNIFICANT CHANGE UP (ref 150–400)
PO2 BLDV: 37 MMHG — SIGNIFICANT CHANGE UP
POTASSIUM BLDV-SCNC: 4.1 MMOL/L — SIGNIFICANT CHANGE UP (ref 3.5–5.1)
POTASSIUM SERPL-MCNC: 4.3 MMOL/L — SIGNIFICANT CHANGE UP (ref 3.5–5.3)
POTASSIUM SERPL-SCNC: 4.3 MMOL/L — SIGNIFICANT CHANGE UP (ref 3.5–5.3)
PROT SERPL-MCNC: 5.4 G/DL — LOW (ref 6–8.3)
RBC # BLD: 3.85 M/UL — LOW (ref 4.2–5.8)
RBC # FLD: 16.3 % — HIGH (ref 10.3–14.5)
SAO2 % BLDV: 59.7 % — SIGNIFICANT CHANGE UP
SODIUM SERPL-SCNC: 137 MMOL/L — SIGNIFICANT CHANGE UP (ref 135–145)
WBC # BLD: 10.09 K/UL — SIGNIFICANT CHANGE UP (ref 3.8–10.5)
WBC # FLD AUTO: 10.09 K/UL — SIGNIFICANT CHANGE UP (ref 3.8–10.5)

## 2022-09-27 PROCEDURE — 71045 X-RAY EXAM CHEST 1 VIEW: CPT | Mod: 26

## 2022-09-27 PROCEDURE — 99291 CRITICAL CARE FIRST HOUR: CPT

## 2022-09-27 PROCEDURE — 99223 1ST HOSP IP/OBS HIGH 75: CPT | Mod: FS

## 2022-09-27 RX ORDER — VENLAFAXINE HCL 75 MG
150 CAPSULE, EXT RELEASE 24 HR ORAL DAILY
Refills: 0 | Status: DISCONTINUED | OUTPATIENT
Start: 2022-09-27 | End: 2022-10-01

## 2022-09-27 RX ORDER — MIRTAZAPINE 45 MG/1
7.5 TABLET, ORALLY DISINTEGRATING ORAL AT BEDTIME
Refills: 0 | Status: DISCONTINUED | OUTPATIENT
Start: 2022-09-27 | End: 2022-10-01

## 2022-09-27 RX ORDER — FUROSEMIDE 40 MG
40 TABLET ORAL DAILY
Refills: 0 | Status: DISCONTINUED | OUTPATIENT
Start: 2022-09-27 | End: 2022-10-01

## 2022-09-27 RX ADMIN — LIDOCAINE 1 PATCH: 4 CREAM TOPICAL at 19:12

## 2022-09-27 RX ADMIN — Medication 3 MILLILITER(S): at 22:47

## 2022-09-27 RX ADMIN — CHLORHEXIDINE GLUCONATE 1 APPLICATION(S): 213 SOLUTION TOPICAL at 05:23

## 2022-09-27 RX ADMIN — HEPARIN SODIUM 5000 UNIT(S): 5000 INJECTION INTRAVENOUS; SUBCUTANEOUS at 13:09

## 2022-09-27 RX ADMIN — SODIUM CHLORIDE 4 MILLILITER(S): 9 INJECTION INTRAMUSCULAR; INTRAVENOUS; SUBCUTANEOUS at 09:43

## 2022-09-27 RX ADMIN — DORNASE ALFA 2.5 MILLIGRAM(S): 1 SOLUTION RESPIRATORY (INHALATION) at 09:42

## 2022-09-27 RX ADMIN — Medication 4.84 MICROGRAM(S)/KG/MIN: at 12:02

## 2022-09-27 RX ADMIN — Medication 3 MILLILITER(S): at 03:44

## 2022-09-27 RX ADMIN — DORNASE ALFA 2.5 MILLIGRAM(S): 1 SOLUTION RESPIRATORY (INHALATION) at 22:47

## 2022-09-27 RX ADMIN — Medication 3 MILLILITER(S): at 09:44

## 2022-09-27 RX ADMIN — HEPARIN SODIUM 5000 UNIT(S): 5000 INJECTION INTRAVENOUS; SUBCUTANEOUS at 22:08

## 2022-09-27 RX ADMIN — SENNA PLUS 2 TABLET(S): 8.6 TABLET ORAL at 22:08

## 2022-09-27 RX ADMIN — Medication 3 MILLILITER(S): at 15:46

## 2022-09-27 RX ADMIN — LIDOCAINE 1 PATCH: 4 CREAM TOPICAL at 12:01

## 2022-09-27 RX ADMIN — Medication 81 MILLIGRAM(S): at 12:01

## 2022-09-27 RX ADMIN — HEPARIN SODIUM 5000 UNIT(S): 5000 INJECTION INTRAVENOUS; SUBCUTANEOUS at 05:22

## 2022-09-27 RX ADMIN — Medication 150 MILLIGRAM(S): at 14:15

## 2022-09-27 RX ADMIN — SODIUM CHLORIDE 4 MILLILITER(S): 9 INJECTION INTRAMUSCULAR; INTRAVENOUS; SUBCUTANEOUS at 22:47

## 2022-09-27 RX ADMIN — PANTOPRAZOLE SODIUM 40 MILLIGRAM(S): 20 TABLET, DELAYED RELEASE ORAL at 12:01

## 2022-09-27 RX ADMIN — TAMSULOSIN HYDROCHLORIDE 0.4 MILLIGRAM(S): 0.4 CAPSULE ORAL at 22:08

## 2022-09-27 RX ADMIN — Medication 40 MILLIGRAM(S): at 10:53

## 2022-09-27 RX ADMIN — POLYETHYLENE GLYCOL 3350 17 GRAM(S): 17 POWDER, FOR SOLUTION ORAL at 12:02

## 2022-09-27 RX ADMIN — LIDOCAINE 1 PATCH: 4 CREAM TOPICAL at 01:25

## 2022-09-27 RX ADMIN — MIRTAZAPINE 7.5 MILLIGRAM(S): 45 TABLET, ORALLY DISINTEGRATING ORAL at 22:09

## 2022-09-27 NOTE — DIETITIAN INITIAL EVALUATION ADULT - CALCULATED TO (ML/KG)
MEDICATION PRIOR AUTHORIZATION NEEDED:    1. Name of Medication:DEPO-TESTOSTERONE)    2. Requested By (Name of Pharmacy): [facility]     3. Is insurance on file current? YES    4. What is the name & phone number of the 3rd party payor? [phone]        [phone]

## 2022-09-27 NOTE — CONSULT NOTE ADULT - PROBLEM SELECTOR RECOMMENDATION 9
Has been diuresing well, net negative 2 L in 24 hrs.   Ok to continue Lasix 40 mg po qd.   Goal is to wean off dobutamine gtt. He has excelled SBP currently. D/w primary CTI team.   Will hopefully be able to tolerate HF GDMT over the next few days.   Continue to monitor CVP and CV sat.   Keep K 4.0-5.0 and mag >2.0.   Standing weights and strict I/O.   Post OP care by CTI team.

## 2022-09-27 NOTE — CONSULT NOTE ADULT - SUBJECTIVE AND OBJECTIVE BOX
Date of Admission:    CHIEF COMPLAINT:    HISTORY OF PRESENT ILLNESS:      Allergies    No Known Allergies    Intolerances    	    MEDICATIONS:  aspirin enteric coated 81 milliGRAM(s) Oral daily  DOBUTamine Infusion 2 MICROgram(s)/kG/Min IV Continuous <Continuous>  furosemide    Tablet 40 milliGRAM(s) Oral daily  heparin   Injectable 5000 Unit(s) SubCutaneous every 8 hours  tamsulosin 0.4 milliGRAM(s) Oral at bedtime      albuterol/ipratropium for Nebulization 3 milliLiter(s) Nebulizer every 6 hours  dornase delilah Solution 2.5 milliGRAM(s) Inhalation two times a day  sodium chloride 3%  Inhalation 4 milliLiter(s) Inhalation every 12 hours    HYDROmorphone  Injectable 0.25 milliGRAM(s) IV Push every 6 hours PRN  melatonin 3 milliGRAM(s) Oral at bedtime PRN  oxyCODONE    IR 5 milliGRAM(s) Oral every 6 hours PRN  zolpidem 5 milliGRAM(s) Oral at bedtime PRN  zolpidem 5 milliGRAM(s) Oral at bedtime PRN    pantoprazole  Injectable 40 milliGRAM(s) IV Push daily  polyethylene glycol 3350 17 Gram(s) Oral daily  senna 2 Tablet(s) Oral at bedtime      chlorhexidine 4% Liquid 1 Application(s) Topical <User Schedule>  lidocaine   4% Patch 1 Patch Transdermal daily      PAST MEDICAL & SURGICAL HISTORY:  BPH (benign prostatic hyperplasia)      Bipolar disorder      Depression      Anxiety      Umbilical hernia, incarcerated      Inguinal hernia of right side without obstruction or gangrene      Depression      Constipation      Urinary retention      CAD (coronary artery disease)      SCC (squamous cell carcinoma)      Lung mass      Other nonspecific abnormal finding of lung field      LV (left ventricular) mural thrombus      History of inguinal hernia      Severe left ventricular systolic dysfunction      History of CHF (congestive heart failure)      History of arthroscopy of knee  Right, 1987      History of tonsillectomy  1952      History of hernia repair      H/O coronary angiogram          FAMILY HISTORY:  Family history of depression (Mother)    FH: CAD (coronary artery disease) (Sibling, Sibling)    Family history of diabetes mellitus (DM) (Sibling)        SOCIAL HISTORY:    [ ] Non-smoker  [ ] Smoker  [ ] Alcohol      REVIEW OF SYSTEMS:  CONSTITUTIONAL: No fever, weight loss, or fatigue  EYES: No eye pain, visual disturbances, or discharge  ENMT:  No difficulty hearing, tinnitus, vertigo; No sinus or throat pain  NECK: No pain or stiffness  RESPIRATORY: No cough, wheezing, chills or hemoptysis; No Shortness of Breath  CARDIOVASCULAR: No chest pain, palpitations, passing out, dizziness, or leg swelling  GASTROINTESTINAL: No abdominal or epigastric pain. No nausea, vomiting, or hematemesis; No diarrhea or constipation. No melena or hematochezia.  GENITOURINARY: No dysuria, frequency, hematuria, or incontinence  NEUROLOGICAL: No headaches, memory loss, loss of strength, numbness, or tremors  SKIN: No itching, burning, rashes, or lesions   LYMPH Nodes: No enlarged glands  ENDOCRINE: No heat or cold intolerance; No hair loss  MUSCULOSKELETAL: No joint pain or swelling; No muscle, back, or extremity pain  PSYCHIATRIC: No depression, anxiety, mood swings, or difficulty sleeping  HEME/LYMPH: No easy bruising, or bleeding gums  ALLERY AND IMMUNOLOGIC: No hives or eczema	    [ ] All others negative	  [ ] Unable to obtain    PHYSICAL EXAM:  T(C): 36.2 (09-27-22 @ 08:00), Max: 37.1 (09-26-22 @ 16:00)  HR: 85 (09-27-22 @ 10:00) (75 - 94)  BP: --  RR: 22 (09-27-22 @ 10:00) (13 - 25)  SpO2: 100% (09-27-22 @ 10:00) (96% - 100%)  Wt(kg): --  I&O's Summary    26 Sep 2022 07:01  -  27 Sep 2022 07:00  --------------------------------------------------------  IN: 1012.3 mL / OUT: 3075 mL / NET: -2062.7 mL    27 Sep 2022 07:01  -  27 Sep 2022 11:27  --------------------------------------------------------  IN: 252 mL / OUT: 280 mL / NET: -28 mL        Appearance: Normal	  HEENT:   Normal oral mucosa, PERRL, EOMI	  Lymphatic: No lymphadenopathy  Cardiovascular: Normal S1 S2, No JVD, No murmurs, No edema  Respiratory: Lungs clear to auscultation	  Psychiatry: A & O x 3, Mood & affect appropriate  Gastrointestinal:  Soft, Non-tender, + BS	  Skin: No rashes, No ecchymoses, No cyanosis	  Neurologic: Non-focal  Extremities: Normal range of motion, No clubbing, cyanosis or edema  Vascular: Peripheral pulses palpable 2+ bilaterally        LABS:	 	    CBC Full  -  ( 27 Sep 2022 04:20 )  WBC Count : 10.09 K/uL  Hemoglobin : 11.7 g/dL  Hematocrit : 36.0 %  Platelet Count - Automated : 211 K/uL  Mean Cell Volume : 93.5 fL  Mean Cell Hemoglobin : 30.4 pg  Mean Cell Hemoglobin Concentration : 32.5 gm/dL  Auto Neutrophil # : x  Auto Lymphocyte # : x  Auto Monocyte # : x  Auto Eosinophil # : x  Auto Basophil # : x  Auto Neutrophil % : x  Auto Lymphocyte % : x  Auto Monocyte % : x  Auto Eosinophil % : x  Auto Basophil % : x    09-27    137  |  105  |  21  ----------------------------<  105<H>  4.3   |  23  |  0.87  09-26    136  |  101  |  24<H>  ----------------------------<  124<H>  4.6   |  25  |  1.01    Ca    8.4      27 Sep 2022 04:20  Ca    8.5      26 Sep 2022 21:04  Phos  3.0     09-27  Phos  2.5     09-26  Mg     2.00     09-27  Mg     2.20     09-26    TPro  5.4<L>  /  Alb  2.9<L>  /  TBili  2.2<H>  /  DBili  x   /  AST  351<H>  /  ALT  652<H>  /  AlkPhos  203<H>  09-27  TPro  6.1  /  Alb  3.2<L>  /  TBili  2.3<H>  /  DBili  1.4<H>  /  AST  632<H>  /  ALT  872<H>  /  AlkPhos  190<H>  09-26      < from: Transthoracic Echocardiogram (09.24.22 @ 14:33) >  DIMENSIONS:  Dimensions:     Normal Values:  LA:     4.1 cm    2.0 - 4.0 cm  Ao:     3.7 cm    2.0 - 3.8 cm  SEPTUM: 1.2 cm    0.6 - 1.2 cm  PWT:    1.2 cm    0.6 - 1.1 cm  LVIDd:  6.1 cm    3.0 - 5.6 cm  LVIDs:    ---     1.8 - 4.0 cm  Derived Variables:  LVMI: 161 g/m2  RWT: 0.39  Ejection Fraction (Modified Pizarro Rule): 34 %  ------------------------------------------------------------------------  OBSERVATIONS:  Mitral Valve: Mitral annular calcification, otherwise  normal mitral valve. Moderate mitral regurgitation.  Aortic Root: Normal aortic root.  Aortic Valve: Calcified trileaflet aortic valve with normal  opening. Mild aortic regurgitation.  Left Atrium: Normal left atrium.  LA volume index = 28  cc/m2.  Left Ventricle: Severe global left ventricular systolic  dysfunction. Severe left ventricular enlargement. Moderate  diastolic dysfunction (Stage II).  Right Heart: Normal right atrium. Right ventricular  enlargement with normal right ventricular systolic  function. Normal tricuspid valve.    Moderate tricuspid  regurgitation. Normal pulmonic valve. Mild pulmonic  regurgitation.  Pericardium/PleuraNormal pericardium with no pericardial  effusion.  Hemodynamic: Estimated right ventricular systolic pressure  equals 82 mm Hg, assuming right atrial pressure equals 10  mm Hg, consistent with severe pulmonary hypertension.      < from: Cardiac Catheterization (05.11.22 @ 15:16) >  Diagnostic Conclusions:     RCA  and significant LAD and diagonal lesions with LILLIANA 3 flow.  IABP placed for elevated right sided pressures.  Consider  PCI after medical management and possible viability     Procedure Narrative:   The risks and alternatives of the procedures and conscious sedation  were explained to the patient and informed consent was  obtained. The patient was brought to the cath lab and placed on the  exam table.  Access   Right femoral artery:   The puncture site was infiltrated with 1% Lidocaine. Vascular access  was obtained using modified seldinger technique.  Right femoral vein:   The puncture site was infiltrated with 1% Lidocaine. Vascular access  was obtained using modified seldinger technique.    Intra-aortic Balloon Pump   An intra-aortic balloon pump was inserted.      Diagnostic Findings:     Coronary Angiography   The coronary circulation is right dominant.      LM   Left main artery: Angiography shows no disease.      LAD   Proximal left anterior descending: There is a 70 % stenosis. First  diagonal: There is a 70 % stenosis.      < end of copied text >      < from: Xray Chest 1 View- PORTABLE-Routine (Xray Chest 1 View- PORTABLE-Routine in AM.) (09.27.22 @ 05:43) >  PULMONARY: The visualized lungs are clear of airspace consolidations or   effusions.   No pneumothorax.    HEART/VASCULAR: Vascular heartbeat    BONES: Visualized osseous structures are intact.    IMPRESSION:   No interval change..    < end of copied text >       Date of Admission:  9/23/22    CHIEF COMPLAINT:    HISTORY OF PRESENT ILLNESS:    78 y/o male with PMHX of HFrEF (9/25/22 TTE LVEF 34%, LV 6.1cm, LA 4.1 cm, mod MR, moderate diastolic dysfunction, RV enlargement w/ normal function, mod TR, mild VT, RVSP 82 with severe pulmonary HTN), CAD w/ recent STEMI  5/11/23 w/ LHC w/ IABP showing  mid %, LAD 70%, D1 70%- medical management recommended. During that admission TTE showed LV thrombus and was started on Coumadin, with last coumadin dose reported 8/26/22. Additional hx includes bipolar disorder, BPH, SCC of leg, right upper lobe mass. Patient is in CTI s/p 9/23/22 right VATS and right upper lobectomy which was complicated by cardiogenic shock requiring dobutamine. Labs on 9/24 significant for elevation in LFTs AST 2768, ALT 1653, which has since improved on dobutamine. HF service consulted by general cardiology service to help manage care in this patient who is in cardiogenic shock on Dobutamine. Currently SBP currently 120s. CVP 6, with 2 L net negative fluids.     Cardiac studies:  9/25/22 TTE LVEF 34%, LV 6.1cm, LA 4.1 cm, mod MR, moderate diastolic dysfunction, RV enlargement w/ normal function, mod TR, mild VT, RVSP 82 with severe pulmonary HTN  5/11/23  LHC w/ IABP showing  mid %, LAD 70%, D1 70%- medical management recommended  RHC /85/41, RA 15, PCWP 31, PA sat 59%, CO 3.63, CI 1.70, PVR 2.76.       Allergies    No Known Allergies    Intolerances    	    MEDICATIONS:  aspirin enteric coated 81 milliGRAM(s) Oral daily  DOBUTamine Infusion 2 MICROgram(s)/kG/Min IV Continuous <Continuous>  furosemide    Tablet 40 milliGRAM(s) Oral daily  heparin   Injectable 5000 Unit(s) SubCutaneous every 8 hours  tamsulosin 0.4 milliGRAM(s) Oral at bedtime      albuterol/ipratropium for Nebulization 3 milliLiter(s) Nebulizer every 6 hours  dornase delilah Solution 2.5 milliGRAM(s) Inhalation two times a day  sodium chloride 3%  Inhalation 4 milliLiter(s) Inhalation every 12 hours    HYDROmorphone  Injectable 0.25 milliGRAM(s) IV Push every 6 hours PRN  melatonin 3 milliGRAM(s) Oral at bedtime PRN  oxyCODONE    IR 5 milliGRAM(s) Oral every 6 hours PRN  zolpidem 5 milliGRAM(s) Oral at bedtime PRN  zolpidem 5 milliGRAM(s) Oral at bedtime PRN    pantoprazole  Injectable 40 milliGRAM(s) IV Push daily  polyethylene glycol 3350 17 Gram(s) Oral daily  senna 2 Tablet(s) Oral at bedtime      chlorhexidine 4% Liquid 1 Application(s) Topical <User Schedule>  lidocaine   4% Patch 1 Patch Transdermal daily      PAST MEDICAL & SURGICAL HISTORY:  BPH (benign prostatic hyperplasia)      Bipolar disorder      Depression      Anxiety      Umbilical hernia, incarcerated      Inguinal hernia of right side without obstruction or gangrene      Depression      Constipation      Urinary retention      CAD (coronary artery disease)      SCC (squamous cell carcinoma)      Lung mass      Other nonspecific abnormal finding of lung field      LV (left ventricular) mural thrombus      History of inguinal hernia      Severe left ventricular systolic dysfunction      History of CHF (congestive heart failure)      History of arthroscopy of knee  Right, 1987      History of tonsillectomy  1952      History of hernia repair      H/O coronary angiogram          FAMILY HISTORY:  Family history of depression (Mother)    FH: CAD (coronary artery disease) (Sibling, Sibling)    Family history of diabetes mellitus (DM) (Sibling)        SOCIAL HISTORY:    [ ] Non-smoker  [ ] Smoker  [ ] Alcohol      REVIEW OF SYSTEMS:  CONSTITUTIONAL: No fever, weight loss, or fatigue  EYES: No eye pain, visual disturbances, or discharge  ENMT:  No difficulty hearing, tinnitus, vertigo; No sinus or throat pain  NECK: No pain or stiffness  RESPIRATORY: No cough, wheezing, chills or hemoptysis; No Shortness of Breath  CARDIOVASCULAR: No chest pain, palpitations, passing out, dizziness, or leg swelling  GASTROINTESTINAL: No abdominal or epigastric pain. No nausea, vomiting, or hematemesis; No diarrhea or constipation. No melena or hematochezia.  GENITOURINARY: No dysuria, frequency, hematuria, or incontinence  NEUROLOGICAL: No headaches, memory loss, loss of strength, numbness, or tremors  SKIN: No itching, burning, rashes, or lesions   LYMPH Nodes: No enlarged glands  ENDOCRINE: No heat or cold intolerance; No hair loss  MUSCULOSKELETAL: No joint pain or swelling; No muscle, back, or extremity pain  PSYCHIATRIC: No depression, anxiety, mood swings, or difficulty sleeping  HEME/LYMPH: No easy bruising, or bleeding gums  ALLERY AND IMMUNOLOGIC: No hives or eczema	    [ ] All others negative	  [ ] Unable to obtain    PHYSICAL EXAM:  T(C): 36.2 (09-27-22 @ 08:00), Max: 37.1 (09-26-22 @ 16:00)  HR: 85 (09-27-22 @ 10:00) (75 - 94)  BP: --  RR: 22 (09-27-22 @ 10:00) (13 - 25)  SpO2: 100% (09-27-22 @ 10:00) (96% - 100%)  Wt(kg): --  I&O's Summary    26 Sep 2022 07:01  -  27 Sep 2022 07:00  --------------------------------------------------------  IN: 1012.3 mL / OUT: 3075 mL / NET: -2062.7 mL    27 Sep 2022 07:01  -  27 Sep 2022 11:27  --------------------------------------------------------  IN: 252 mL / OUT: 280 mL / NET: -28 mL        Appearance: Normal	  HEENT:   Normal oral mucosa, PERRL, EOMI	  Lymphatic: No lymphadenopathy  Cardiovascular: Normal S1 S2, No JVD, No murmurs, No edema  Respiratory: Lungs clear to auscultation	  Psychiatry: A & O x 3, Mood & affect appropriate  Gastrointestinal:  Soft, Non-tender, + BS	  Skin: No rashes, No ecchymoses, No cyanosis	  Neurologic: Non-focal  Extremities: Normal range of motion, No clubbing, cyanosis or edema  Vascular: Peripheral pulses palpable 2+ bilaterally        LABS:	 	    CBC Full  -  ( 27 Sep 2022 04:20 )  WBC Count : 10.09 K/uL  Hemoglobin : 11.7 g/dL  Hematocrit : 36.0 %  Platelet Count - Automated : 211 K/uL  Mean Cell Volume : 93.5 fL  Mean Cell Hemoglobin : 30.4 pg  Mean Cell Hemoglobin Concentration : 32.5 gm/dL  Auto Neutrophil # : x  Auto Lymphocyte # : x  Auto Monocyte # : x  Auto Eosinophil # : x  Auto Basophil # : x  Auto Neutrophil % : x  Auto Lymphocyte % : x  Auto Monocyte % : x  Auto Eosinophil % : x  Auto Basophil % : x    09-27    137  |  105  |  21  ----------------------------<  105<H>  4.3   |  23  |  0.87  09-26    136  |  101  |  24<H>  ----------------------------<  124<H>  4.6   |  25  |  1.01    Ca    8.4      27 Sep 2022 04:20  Ca    8.5      26 Sep 2022 21:04  Phos  3.0     09-27  Phos  2.5     09-26  Mg     2.00     09-27  Mg     2.20     09-26    TPro  5.4<L>  /  Alb  2.9<L>  /  TBili  2.2<H>  /  DBili  x   /  AST  351<H>  /  ALT  652<H>  /  AlkPhos  203<H>  09-27  TPro  6.1  /  Alb  3.2<L>  /  TBili  2.3<H>  /  DBili  1.4<H>  /  AST  632<H>  /  ALT  872<H>  /  AlkPhos  190<H>  09-26      < from: Transthoracic Echocardiogram (09.24.22 @ 14:33) >  DIMENSIONS:  Dimensions:     Normal Values:  LA:     4.1 cm    2.0 - 4.0 cm  Ao:     3.7 cm    2.0 - 3.8 cm  SEPTUM: 1.2 cm    0.6 - 1.2 cm  PWT:    1.2 cm    0.6 - 1.1 cm  LVIDd:  6.1 cm    3.0 - 5.6 cm  LVIDs:    ---     1.8 - 4.0 cm  Derived Variables:  LVMI: 161 g/m2  RWT: 0.39  Ejection Fraction (Modified Pizarro Rule): 34 %  ------------------------------------------------------------------------  OBSERVATIONS:  Mitral Valve: Mitral annular calcification, otherwise  normal mitral valve. Moderate mitral regurgitation.  Aortic Root: Normal aortic root.  Aortic Valve: Calcified trileaflet aortic valve with normal  opening. Mild aortic regurgitation.  Left Atrium: Normal left atrium.  LA volume index = 28  cc/m2.  Left Ventricle: Severe global left ventricular systolic  dysfunction. Severe left ventricular enlargement. Moderate  diastolic dysfunction (Stage II).  Right Heart: Normal right atrium. Right ventricular  enlargement with normal right ventricular systolic  function. Normal tricuspid valve.    Moderate tricuspid  regurgitation. Normal pulmonic valve. Mild pulmonic  regurgitation.  Pericardium/PleuraNormal pericardium with no pericardial  effusion.  Hemodynamic: Estimated right ventricular systolic pressure  equals 82 mm Hg, assuming right atrial pressure equals 10  mm Hg, consistent with severe pulmonary hypertension.      < from: Cardiac Catheterization (05.11.22 @ 15:16) >  Diagnostic Conclusions:     RCA  and significant LAD and diagonal lesions with LILLIANA 3 flow.  IABP placed for elevated right sided pressures.  Consider  PCI after medical management and possible viability     Procedure Narrative:   The risks and alternatives of the procedures and conscious sedation  were explained to the patient and informed consent was  obtained. The patient was brought to the cath lab and placed on the  exam table.  Access   Right femoral artery:   The puncture site was infiltrated with 1% Lidocaine. Vascular access  was obtained using modified seldinger technique.  Right femoral vein:   The puncture site was infiltrated with 1% Lidocaine. Vascular access  was obtained using modified seldinger technique.    Intra-aortic Balloon Pump   An intra-aortic balloon pump was inserted.      Diagnostic Findings:     Coronary Angiography   The coronary circulation is right dominant.      LM   Left main artery: Angiography shows no disease.      LAD   Proximal left anterior descending: There is a 70 % stenosis. First  diagonal: There is a 70 % stenosis.      < end of copied text >      < from: Xray Chest 1 View- PORTABLE-Routine (Xray Chest 1 View- PORTABLE-Routine in AM.) (09.27.22 @ 05:43) >  PULMONARY: The visualized lungs are clear of airspace consolidations or   effusions.   No pneumothorax.    HEART/VASCULAR: Vascular heartbeat    BONES: Visualized osseous structures are intact.    IMPRESSION:   No interval change..    < end of copied text >       Date of Admission:  9/23/22    CHIEF COMPLAINT:    HISTORY OF PRESENT ILLNESS:    76 y/o male with PMHX of HFrEF (9/25/22 TTE LVEF 34%, LV 6.1cm, LA 4.1 cm, mod MR, moderate diastolic dysfunction, RV enlargement w/ normal function, mod TR, mild ME, RVSP 82 with severe pulmonary HTN), CAD w/ recent STEMI  5/11/23 w/ LHC w/ IABP showing  mid %, LAD 70%, D1 70%- medical management recommended. During that admission TTE showed LV thrombus and was started on Coumadin, with last coumadin dose reported 8/26/22. Additional hx includes bipolar disorder, BPH, SCC of leg, right upper lobe mass. Patient is in CTI s/p 9/23/22 right VATS and right upper lobectomy which was complicated by cardiogenic shock requiring dobutamine. Labs on 9/24 significant for elevation in LFTs AST 2768, ALT 1653, which has since improved. HF service consulted by general cardiology service to help manage care in this patient who is in cardiogenic shock on Dobutamine. Currently SBP currently 120s. CVP 6, with 2 L net negative fluids. IV Lasix 10 mg x 1 was given on 9/26, with current Lasix 40 mg po qd.     Cardiac studies:  9/25/22 TTE LVEF 34%, LV 6.1cm, LA 4.1 cm, mod MR, moderate diastolic dysfunction, RV enlargement w/ normal function, mod TR, mild ME, RVSP 82 with severe pulmonary HTN  5/11/23  LHC w/ IABP showing  mid %, LAD 70%, D1 70%- medical management recommended  RHC /85/41, RA 15, PCWP 31, PA sat 59%, CO 3.63, CI 1.70, PVR 2.76.       Allergies    No Known Allergies    Intolerances    	    MEDICATIONS:  aspirin enteric coated 81 milliGRAM(s) Oral daily  DOBUTamine Infusion 2 MICROgram(s)/kG/Min IV Continuous <Continuous>  furosemide    Tablet 40 milliGRAM(s) Oral daily  heparin   Injectable 5000 Unit(s) SubCutaneous every 8 hours  tamsulosin 0.4 milliGRAM(s) Oral at bedtime      albuterol/ipratropium for Nebulization 3 milliLiter(s) Nebulizer every 6 hours  dornase delilah Solution 2.5 milliGRAM(s) Inhalation two times a day  sodium chloride 3%  Inhalation 4 milliLiter(s) Inhalation every 12 hours    HYDROmorphone  Injectable 0.25 milliGRAM(s) IV Push every 6 hours PRN  melatonin 3 milliGRAM(s) Oral at bedtime PRN  oxyCODONE    IR 5 milliGRAM(s) Oral every 6 hours PRN  zolpidem 5 milliGRAM(s) Oral at bedtime PRN  zolpidem 5 milliGRAM(s) Oral at bedtime PRN    pantoprazole  Injectable 40 milliGRAM(s) IV Push daily  polyethylene glycol 3350 17 Gram(s) Oral daily  senna 2 Tablet(s) Oral at bedtime      chlorhexidine 4% Liquid 1 Application(s) Topical <User Schedule>  lidocaine   4% Patch 1 Patch Transdermal daily      PAST MEDICAL & SURGICAL HISTORY:  BPH (benign prostatic hyperplasia)      Bipolar disorder      Depression      Anxiety      Umbilical hernia, incarcerated      Inguinal hernia of right side without obstruction or gangrene      Depression      Constipation      Urinary retention      CAD (coronary artery disease)      SCC (squamous cell carcinoma)      Lung mass      Other nonspecific abnormal finding of lung field      LV (left ventricular) mural thrombus      History of inguinal hernia      Severe left ventricular systolic dysfunction      History of CHF (congestive heart failure)      History of arthroscopy of knee  Right, 1987      History of tonsillectomy  1952      History of hernia repair      H/O coronary angiogram          FAMILY HISTORY:  Family history of depression (Mother)    FH: CAD (coronary artery disease) (Sibling, Sibling)    Family history of diabetes mellitus (DM) (Sibling)        SOCIAL HISTORY:    [ ] Non-smoker  [ ] Smoker  [ ] Alcohol      REVIEW OF SYSTEMS:  CONSTITUTIONAL: No fever, weight loss, or fatigue  EYES: No eye pain, visual disturbances, or discharge  ENMT:  No difficulty hearing, tinnitus, vertigo; No sinus or throat pain  NECK: No pain or stiffness  RESPIRATORY: No cough, wheezing, chills or hemoptysis; No Shortness of Breath  CARDIOVASCULAR: No chest pain, palpitations, passing out, dizziness, or leg swelling  GASTROINTESTINAL: No abdominal or epigastric pain. No nausea, vomiting, or hematemesis; No diarrhea or constipation. No melena or hematochezia.  GENITOURINARY: No dysuria, frequency, hematuria, or incontinence  NEUROLOGICAL: No headaches, memory loss, loss of strength, numbness, or tremors  SKIN: No itching, burning, rashes, or lesions   LYMPH Nodes: No enlarged glands  ENDOCRINE: No heat or cold intolerance; No hair loss  MUSCULOSKELETAL: No joint pain or swelling; No muscle, back, or extremity pain  PSYCHIATRIC: No depression, anxiety, mood swings, or difficulty sleeping  HEME/LYMPH: No easy bruising, or bleeding gums  ALLERY AND IMMUNOLOGIC: No hives or eczema	    [ ] All others negative	  [ ] Unable to obtain    PHYSICAL EXAM:  T(C): 36.2 (09-27-22 @ 08:00), Max: 37.1 (09-26-22 @ 16:00)  HR: 85 (09-27-22 @ 10:00) (75 - 94)  BP: --  RR: 22 (09-27-22 @ 10:00) (13 - 25)  SpO2: 100% (09-27-22 @ 10:00) (96% - 100%)  Wt(kg): --  I&O's Summary    26 Sep 2022 07:01  -  27 Sep 2022 07:00  --------------------------------------------------------  IN: 1012.3 mL / OUT: 3075 mL / NET: -2062.7 mL    27 Sep 2022 07:01  -  27 Sep 2022 11:27  --------------------------------------------------------  IN: 252 mL / OUT: 280 mL / NET: -28 mL        Appearance: Normal	  HEENT:   Normal oral mucosa, PERRL, EOMI	  Lymphatic: No lymphadenopathy  Cardiovascular: Normal S1 S2, No JVD, No murmurs, No edema  Respiratory: Lungs clear to auscultation	  Psychiatry: A & O x 3, Mood & affect appropriate  Gastrointestinal:  Soft, Non-tender, + BS	  Skin: No rashes, No ecchymoses, No cyanosis	  Neurologic: Non-focal  Extremities: Normal range of motion, No clubbing, cyanosis or edema  Vascular: Peripheral pulses palpable 2+ bilaterally        LABS:	 	    CBC Full  -  ( 27 Sep 2022 04:20 )  WBC Count : 10.09 K/uL  Hemoglobin : 11.7 g/dL  Hematocrit : 36.0 %  Platelet Count - Automated : 211 K/uL  Mean Cell Volume : 93.5 fL  Mean Cell Hemoglobin : 30.4 pg  Mean Cell Hemoglobin Concentration : 32.5 gm/dL  Auto Neutrophil # : x  Auto Lymphocyte # : x  Auto Monocyte # : x  Auto Eosinophil # : x  Auto Basophil # : x  Auto Neutrophil % : x  Auto Lymphocyte % : x  Auto Monocyte % : x  Auto Eosinophil % : x  Auto Basophil % : x    09-27    137  |  105  |  21  ----------------------------<  105<H>  4.3   |  23  |  0.87  09-26    136  |  101  |  24<H>  ----------------------------<  124<H>  4.6   |  25  |  1.01    Ca    8.4      27 Sep 2022 04:20  Ca    8.5      26 Sep 2022 21:04  Phos  3.0     09-27  Phos  2.5     09-26  Mg     2.00     09-27  Mg     2.20     09-26    TPro  5.4<L>  /  Alb  2.9<L>  /  TBili  2.2<H>  /  DBili  x   /  AST  351<H>  /  ALT  652<H>  /  AlkPhos  203<H>  09-27  TPro  6.1  /  Alb  3.2<L>  /  TBili  2.3<H>  /  DBili  1.4<H>  /  AST  632<H>  /  ALT  872<H>  /  AlkPhos  190<H>  09-26      < from: Transthoracic Echocardiogram (09.24.22 @ 14:33) >  DIMENSIONS:  Dimensions:     Normal Values:  LA:     4.1 cm    2.0 - 4.0 cm  Ao:     3.7 cm    2.0 - 3.8 cm  SEPTUM: 1.2 cm    0.6 - 1.2 cm  PWT:    1.2 cm    0.6 - 1.1 cm  LVIDd:  6.1 cm    3.0 - 5.6 cm  LVIDs:    ---     1.8 - 4.0 cm  Derived Variables:  LVMI: 161 g/m2  RWT: 0.39  Ejection Fraction (Modified Pizarro Rule): 34 %  ------------------------------------------------------------------------  OBSERVATIONS:  Mitral Valve: Mitral annular calcification, otherwise  normal mitral valve. Moderate mitral regurgitation.  Aortic Root: Normal aortic root.  Aortic Valve: Calcified trileaflet aortic valve with normal  opening. Mild aortic regurgitation.  Left Atrium: Normal left atrium.  LA volume index = 28  cc/m2.  Left Ventricle: Severe global left ventricular systolic  dysfunction. Severe left ventricular enlargement. Moderate  diastolic dysfunction (Stage II).  Right Heart: Normal right atrium. Right ventricular  enlargement with normal right ventricular systolic  function. Normal tricuspid valve.    Moderate tricuspid  regurgitation. Normal pulmonic valve. Mild pulmonic  regurgitation.  Pericardium/PleuraNormal pericardium with no pericardial  effusion.  Hemodynamic: Estimated right ventricular systolic pressure  equals 82 mm Hg, assuming right atrial pressure equals 10  mm Hg, consistent with severe pulmonary hypertension.      < from: Cardiac Catheterization (05.11.22 @ 15:16) >  Diagnostic Conclusions:     RCA  and significant LAD and diagonal lesions with LILLIANA 3 flow.  IABP placed for elevated right sided pressures.  Consider  PCI after medical management and possible viability     Procedure Narrative:   The risks and alternatives of the procedures and conscious sedation  were explained to the patient and informed consent was  obtained. The patient was brought to the cath lab and placed on the  exam table.  Access   Right femoral artery:   The puncture site was infiltrated with 1% Lidocaine. Vascular access  was obtained using modified seldinger technique.  Right femoral vein:   The puncture site was infiltrated with 1% Lidocaine. Vascular access  was obtained using modified seldinger technique.    Intra-aortic Balloon Pump   An intra-aortic balloon pump was inserted.      Diagnostic Findings:     Coronary Angiography   The coronary circulation is right dominant.      LM   Left main artery: Angiography shows no disease.      LAD   Proximal left anterior descending: There is a 70 % stenosis. First  diagonal: There is a 70 % stenosis.      < end of copied text >      < from: Xray Chest 1 View- PORTABLE-Routine (Xray Chest 1 View- PORTABLE-Routine in AM.) (09.27.22 @ 05:43) >  PULMONARY: The visualized lungs are clear of airspace consolidations or   effusions.   No pneumothorax.    HEART/VASCULAR: Vascular heartbeat    BONES: Visualized osseous structures are intact.    IMPRESSION:   No interval change..    < end of copied text >       Date of Admission:  9/23/22    CHIEF COMPLAINT:    HISTORY OF PRESENT ILLNESS:    76 y/o male with PMHX of HFrEF (9/25/22 TTE LVEF 34%, LV 6.1cm, LA 4.1 cm, mod MR, moderate diastolic dysfunction, RV enlargement w/ normal function, mod TR, mild PA, RVSP 82 with severe pulmonary HTN), CAD w/ recent STEMI  5/11/22 w/ LHC w/ IABP showing  mid %, LAD 70%, D1 70%- medical management recommended. During that admission TTE showed LV thrombus and was started on Coumadin, with last coumadin dose reported 8/26/22. Additional hx includes bipolar disorder, BPH, SCC of leg, right upper lobe mass. Patient is in CTI s/p 9/23/22 right VATS and right upper lobectomy which was complicated by cardiogenic shock requiring dobutamine. Labs on 9/24 significant for elevation in LFTs AST 2768, ALT 1653, which has since improved. HF service consulted by general cardiology service to help manage care in this patient who is in cardiogenic shock on Dobutamine. Currently SBP currently 120s. CVP 6, with 2 L net negative fluids. IV Lasix 10 mg x 1 was given on 9/26, with current Lasix 40 mg po qd. Patient reports walking 1/2 mile prior to admission w/o OGLESBY, and swimming 30 mins w/o difficulty.  No CP, palpitations, dizziness.     Cardiac studies:  9/25/22 TTE LVEF 34%, LV 6.1cm, LA 4.1 cm, mod MR, moderate diastolic dysfunction, RV enlargement w/ normal function, mod TR, mild PA, RVSP 82 with severe pulmonary HTN  5/11/23  [facility] w/ IABP showing  mid %, LAD 70%, D1 70%- medical management recommended  RHC /85/41, RA 15, PCWP 31, PA sat 59%, CO 3.63, CI 1.70, PVR 2.76.       Allergies    No Known Allergies    Intolerances    	    MEDICATIONS:  aspirin enteric coated 81 milliGRAM(s) Oral daily  DOBUTamine Infusion 2 MICROgram(s)/kG/Min IV Continuous <Continuous>  furosemide    Tablet 40 milliGRAM(s) Oral daily  heparin   Injectable 5000 Unit(s) SubCutaneous every 8 hours  tamsulosin 0.4 milliGRAM(s) Oral at bedtime      albuterol/ipratropium for Nebulization 3 milliLiter(s) Nebulizer every 6 hours  dornase delilah Solution 2.5 milliGRAM(s) Inhalation two times a day  sodium chloride 3%  Inhalation 4 milliLiter(s) Inhalation every 12 hours    HYDROmorphone  Injectable 0.25 milliGRAM(s) IV Push every 6 hours PRN  melatonin 3 milliGRAM(s) Oral at bedtime PRN  oxyCODONE    IR 5 milliGRAM(s) Oral every 6 hours PRN  zolpidem 5 milliGRAM(s) Oral at bedtime PRN  zolpidem 5 milliGRAM(s) Oral at bedtime PRN    pantoprazole  Injectable 40 milliGRAM(s) IV Push daily  polyethylene glycol 3350 17 Gram(s) Oral daily  senna 2 Tablet(s) Oral at bedtime      chlorhexidine 4% Liquid 1 Application(s) Topical <User Schedule>  lidocaine   4% Patch 1 Patch Transdermal daily      PAST MEDICAL & SURGICAL HISTORY:  BPH (benign prostatic hyperplasia)      Bipolar disorder      Depression      Anxiety      Umbilical hernia, incarcerated      Inguinal hernia of right side without obstruction or gangrene      Depression      Constipation      Urinary retention      CAD (coronary artery disease)      SCC (squamous cell carcinoma)      Lung mass      Other nonspecific abnormal finding of lung field      LV (left ventricular) mural thrombus      History of inguinal hernia      Severe left ventricular systolic dysfunction      History of CHF (congestive heart failure)      History of arthroscopy of knee  Right, 1987      History of tonsillectomy  1952      History of hernia repair      H/O coronary angiogram          FAMILY HISTORY:  Family history of depression (Mother)    FH: CAD (coronary artery disease) (Sibling, Sibling)    Family history of diabetes mellitus (DM) (Sibling)        SOCIAL HISTORY:    [ ] Non-smoker  [ ] Smoker  [ ] Alcohol      REVIEW OF SYSTEMS:  CONSTITUTIONAL: No fever, weight loss, or fatigue  EYES: No eye pain, visual disturbances, or discharge  ENMT:  No difficulty hearing, tinnitus, vertigo; No sinus or throat pain  NECK: No pain or stiffness  RESPIRATORY: No cough, wheezing, chills or hemoptysis; No Shortness of Breath  CARDIOVASCULAR: No chest pain, palpitations, passing out, dizziness, or leg swelling  GASTROINTESTINAL: No abdominal or epigastric pain. No nausea, vomiting, or hematemesis; No diarrhea or constipation. No melena or hematochezia.  GENITOURINARY: No dysuria, frequency, hematuria, or incontinence  NEUROLOGICAL: No headaches, memory loss, loss of strength, numbness, or tremors  SKIN: No itching, burning, rashes, or lesions   LYMPH Nodes: No enlarged glands  ENDOCRINE: No heat or cold intolerance; No hair loss  MUSCULOSKELETAL: No joint pain or swelling; No muscle, back, or extremity pain  PSYCHIATRIC: No depression, anxiety, mood swings, or difficulty sleeping  HEME/LYMPH: No easy bruising, or bleeding gums  ALLERY AND IMMUNOLOGIC: No hives or eczema	    [ ] All others negative	  [ ] Unable to obtain    PHYSICAL EXAM:  T(C): 36.2 (09-27-22 @ 08:00), Max: 37.1 (09-26-22 @ 16:00)  HR: 85 (09-27-22 @ 10:00) (75 - 94)  BP: --  RR: 22 (09-27-22 @ 10:00) (13 - 25)  SpO2: 100% (09-27-22 @ 10:00) (96% - 100%)  Wt(kg): --  I&O's Summary    26 Sep 2022 07:01  -  27 Sep 2022 07:00  --------------------------------------------------------  IN: 1012.3 mL / OUT: 3075 mL / NET: -2062.7 mL    27 Sep 2022 07:01  -  27 Sep 2022 11:27  --------------------------------------------------------  IN: 252 mL / OUT: 280 mL / NET: -28 mL        Appearance: Normal	  HEENT:   Normal oral mucosa, PERRL, EOMI	  Lymphatic: No lymphadenopathy  Cardiovascular: Normal S1 S2, No JVD, No murmurs, No edema  Respiratory: Lungs clear to auscultation	  Psychiatry: A & O x 3, Mood & affect appropriate  Gastrointestinal:  Soft, Non-tender, + BS	  Skin: No rashes, No ecchymoses, No cyanosis	  Neurologic: Non-focal  Extremities: Normal range of motion, No clubbing, cyanosis or edema  Vascular: Peripheral pulses palpable 2+ bilaterally        LABS:	 	    CBC Full  -  ( 27 Sep 2022 04:20 )  WBC Count : 10.09 K/uL  Hemoglobin : 11.7 g/dL  Hematocrit : 36.0 %  Platelet Count - Automated : 211 K/uL  Mean Cell Volume : 93.5 fL  Mean Cell Hemoglobin : 30.4 pg  Mean Cell Hemoglobin Concentration : 32.5 gm/dL  Auto Neutrophil # : x  Auto Lymphocyte # : x  Auto Monocyte # : x  Auto Eosinophil # : x  Auto Basophil # : x  Auto Neutrophil % : x  Auto Lymphocyte % : x  Auto Monocyte % : x  Auto Eosinophil % : x  Auto Basophil % : x    09-27    137  |  105  |  21  ----------------------------<  105<H>  4.3   |  23  |  0.87  09-26    136  |  101  |  24<H>  ----------------------------<  124<H>  4.6   |  25  |  1.01    Ca    8.4      27 Sep 2022 04:20  Ca    8.5      26 Sep 2022 21:04  Phos  3.0     09-27  Phos  2.5     09-26  Mg     2.00     09-27  Mg     2.20     09-26    TPro  5.4<L>  /  Alb  2.9<L>  /  TBili  2.2<H>  /  DBili  x   /  AST  351<H>  /  ALT  652<H>  /  AlkPhos  203<H>  09-27  TPro  6.1  /  Alb  3.2<L>  /  TBili  2.3<H>  /  DBili  1.4<H>  /  AST  632<H>  /  ALT  872<H>  /  AlkPhos  190<H>  09-26      < from: Transthoracic Echocardiogram (09.24.22 @ 14:33) >  DIMENSIONS:  Dimensions:     Normal Values:  LA:     4.1 cm    2.0 - 4.0 cm  Ao:     3.7 cm    2.0 - 3.8 cm  SEPTUM: 1.2 cm    0.6 - 1.2 cm  PWT:    1.2 cm    0.6 - 1.1 cm  LVIDd:  6.1 cm    3.0 - 5.6 cm  LVIDs:    ---     1.8 - 4.0 cm  Derived Variables:  LVMI: 161 g/m2  RWT: 0.39  Ejection Fraction (Modified Pizarro Rule): 34 %  ------------------------------------------------------------------------  OBSERVATIONS:  Mitral Valve: Mitral annular calcification, otherwise  normal mitral valve. Moderate mitral regurgitation.  Aortic Root: Normal aortic root.  Aortic Valve: Calcified trileaflet aortic valve with normal  opening. Mild aortic regurgitation.  Left Atrium: Normal left atrium.  LA volume index = 28  cc/m2.  Left Ventricle: Severe global left ventricular systolic  dysfunction. Severe left ventricular enlargement. Moderate  diastolic dysfunction (Stage II).  Right Heart: Normal right atrium. Right ventricular  enlargement with normal right ventricular systolic  function. Normal tricuspid valve.    Moderate tricuspid  regurgitation. Normal pulmonic valve. Mild pulmonic  regurgitation.  Pericardium/PleuraNormal pericardium with no pericardial  effusion.  Hemodynamic: Estimated right ventricular systolic pressure  equals 82 mm Hg, assuming right atrial pressure equals 10  mm Hg, consistent with severe pulmonary hypertension.      < from: Cardiac Catheterization (05.11.22 @ 15:16) >  Diagnostic Conclusions:     RCA  and significant LAD and diagonal lesions with LILLIANA 3 flow.  IABP placed for elevated right sided pressures.  Consider  PCI after medical management and possible viability     Procedure Narrative:   The risks and alternatives of the procedures and conscious sedation  were explained to the patient and informed consent was  obtained. The patient was brought to the cath lab and placed on the  exam table.  Access   Right femoral artery:   The puncture site was infiltrated with 1% Lidocaine. Vascular access  was obtained using modified seldinger technique.  Right femoral vein:   The puncture site was infiltrated with 1% Lidocaine. Vascular access  was obtained using modified seldinger technique.    Intra-aortic Balloon Pump   An intra-aortic balloon pump was inserted.      Diagnostic Findings:     Coronary Angiography   The coronary circulation is right dominant.      LM   Left main artery: Angiography shows no disease.      LAD   Proximal left anterior descending: There is a 70 % stenosis. First  diagonal: There is a 70 % stenosis.      < end of copied text >      < from: Xray Chest 1 View- PORTABLE-Routine (Xray Chest 1 View- PORTABLE-Routine in AM.) (09.27.22 @ 05:43) >  PULMONARY: The visualized lungs are clear of airspace consolidations or   effusions.   No pneumothorax.    HEART/VASCULAR: Vascular heartbeat    BONES: Visualized osseous structures are intact.    IMPRESSION:   No interval change..    < end of copied text >

## 2022-09-27 NOTE — DIETITIAN INITIAL EVALUATION ADULT - PERTINENT MEDS FT
MEDICATIONS  (STANDING):  albuterol/ipratropium for Nebulization 3 milliLiter(s) Nebulizer every 6 hours  aspirin enteric coated 81 milliGRAM(s) Oral daily  chlorhexidine 4% Liquid 1 Application(s) Topical <User Schedule>  DOBUTamine Infusion 2 MICROgram(s)/kG/Min (4.84 mL/Hr) IV Continuous <Continuous>  dornase delilah Solution 2.5 milliGRAM(s) Inhalation two times a day  furosemide    Tablet 40 milliGRAM(s) Oral daily  heparin   Injectable 5000 Unit(s) SubCutaneous every 8 hours  lidocaine   4% Patch 1 Patch Transdermal daily  pantoprazole  Injectable 40 milliGRAM(s) IV Push daily  polyethylene glycol 3350 17 Gram(s) Oral daily  senna 2 Tablet(s) Oral at bedtime  sodium chloride 3%  Inhalation 4 milliLiter(s) Inhalation every 12 hours  tamsulosin 0.4 milliGRAM(s) Oral at bedtime    MEDICATIONS  (PRN):  HYDROmorphone  Injectable 0.25 milliGRAM(s) IV Push every 6 hours PRN Severe Pain (7 - 10)  melatonin 3 milliGRAM(s) Oral at bedtime PRN Insomnia  oxyCODONE    IR 5 milliGRAM(s) Oral every 6 hours PRN Moderate Pain (4 - 6)  zolpidem 5 milliGRAM(s) Oral at bedtime PRN Insomnia  zolpidem 5 milliGRAM(s) Oral at bedtime PRN Insomnia

## 2022-09-27 NOTE — DIETITIAN INITIAL EVALUATION ADULT - OTHER INFO
76 y/o male hx of bipolar, BPH, SCC of leg, CHF EF 25% severe segmental LV systolic dysfunction, CAD, hospitalized on May 2022 for acute MI s/p cardiac Cath with no intervention. Pt now presents with lung mass s/p RVATS and RUL lobectomy 9/23. Visited with pt and family to obtain nutrition hx. Pt reports fair oral intake as present. His appetite had also been fair PTA due to ongoing medical issues, with subsequent 17 lb weight loss over the past 4 months. He denies food allergies, nausea/vomiting/diarrhea/constipation, or issues with chewing/swallowing. Food preferences taken and menu alternatives discussed. 8.8% weight loss with < 75% of estimated nutrition needs met over the past 4 months PTA present pt at severe risk for malnutrition. Pt also identified with severe muscle mass wasting of the temporal and clavicular regions. Discussed oral nutrition supplementation with Ensure Enlive 2x daily (700 agnes and 40 gm protein) to enhance oral intake and optimize overall nutrition. Pt amenable towards receiving. Pt aware of DASH/TLC dietary principles and declined review at this time. Encourage and monitor oral intake, especially of nutrition supplement. RDN services to remain available as needed.

## 2022-09-27 NOTE — DIETITIAN INITIAL EVALUATION ADULT - PERTINENT LABORATORY DATA
09-27    137  |  105  |  21  ----------------------------<  105<H>  4.3   |  23  |  0.87    Ca    8.4      27 Sep 2022 04:20  Phos  3.0     09-27  Mg     2.00     09-27    TPro  5.4<L>  /  Alb  2.9<L>  /  TBili  2.2<H>  /  DBili  x   /  AST  351<H>  /  ALT  652<H>  /  AlkPhos  203<H>  09-27  A1C with Estimated Average Glucose Result: 6.0 % (09-24-22 @ 03:10)  A1C with Estimated Average Glucose Result: 5.9 % (05-11-22 @ 18:29)

## 2022-09-27 NOTE — PROGRESS NOTE ADULT - NS ATTEND AMEND GEN_ALL_CORE FT
Renal attd    -Maintain negative outpt at present and can use loop diuretics to achieve this goal  -Creatinine improving nicely   -Wean  gtt  -Eventual Toprol and possibly Entresto once off the inotropes    [facility]   [phone]

## 2022-09-27 NOTE — PROGRESS NOTE ADULT - SUBJECTIVE AND OBJECTIVE BOX
ELYSE MALAVE                     [id number]    HPI:  77 yr old male presents with hx of bipolar, BPH, SCC of leg, CHF EF 25% severe segmental LV systolic dysfunction, CAD, hospitalized on May 2022 for acute MI s/p cardiac Cath with no intervention (revealed  RCA and 70% occlusion prox LAD, 1st diagonal 70% stenosis- medical management-no intervention, IABP used for elevated right sided pressures; cardiogenic shock, found to have new 1x 1.3cm LV thrombus on Plavix and Coumadin (Coumadin completed on 08/26/2022, Plavix stopped around same time per pt), c/o one episode of difficulty walking, chest discomfort  and SOB while in the pool, CXR showed RUL nodule, PET/CT showed FDG avid RUL mass      Procedure:  FB, R VATS, R Upper Lobectomy. 23-Sep-2022                       Issues:              Cardiogenic shock  CAD,   Biventricular failure, h/o LV thrombus, resolved   Lung nodule  Postop pain  Chest tube in place  BPH  Bipolar disorder / Depression / Anxiety  Depression    PAST MEDICAL & SURGICAL HISTORY:  BPH (benign prostatic hyperplasia)      Bipolar disorder      Depression      Anxiety      Umbilical hernia, incarcerated      Inguinal hernia of right side without obstruction or gangrene      Depression      Constipation      Urinary retention      CAD (coronary artery disease)      SCC (squamous cell carcinoma)      Lung mass      Other nonspecific abnormal finding of lung field      LV (left ventricular) mural thrombus      History of inguinal hernia      Severe left ventricular systolic dysfunction      History of CHF (congestive heart failure)      History of arthroscopy of knee  Right, 1987      History of tonsillectomy  1952      History of hernia repair      H/O coronary angiogram                VITAL SIGNS:  Vital Signs Last 24 Hrs  T(C): 36.7 (27 Sep 2022 12:00), Max: 36.7 (27 Sep 2022 04:00)  T(F): 98.1 (27 Sep 2022 12:00), Max: 98.1 (27 Sep 2022 12:00)  HR: 90 (27 Sep 2022 15:00) (75 - 94)  BP: --  BP(mean): --  RR: 28 (27 Sep 2022 15:00) (13 - 28)  SpO2: 100% (27 Sep 2022 15:00) (96% - 100%)    Parameters below as of 27 Sep 2022 16:00  Patient On (Oxygen Delivery Method): room air        I/Os:   I&O's Detail    26 Sep 2022 07:01  -  27 Sep 2022 07:00  --------------------------------------------------------  IN:    DOBUTamine: 57.6 mL    DOBUTamine: 84.7 mL    Oral Fluid: 870 mL  Total IN: 1012.3 mL    OUT:    Chest Tube (mL): 380 mL    Indwelling Catheter - Urethral (mL): 2695 mL  Total OUT: 3075 mL    Total NET: -2062.7 mL      27 Sep 2022 07:01  -  27 Sep 2022 16:12  --------------------------------------------------------  IN:    DOBUTamine: 28.8 mL    Oral Fluid: 580 mL  Total IN: 608.8 mL    OUT:    Chest Tube (mL): 30 mL    Indwelling Catheter - Urethral (mL): 875 mL  Total OUT: 905 mL    Total NET: -296.2 mL          CAPILLARY BLOOD GLUCOSE          =======================MEDICATIONS===================  MEDICATIONS  (STANDING):  albuterol/ipratropium for Nebulization 3 milliLiter(s) Nebulizer every 6 hours  aspirin enteric coated 81 milliGRAM(s) Oral daily  chlorhexidine 4% Liquid 1 Application(s) Topical <User Schedule>  DOBUTamine Infusion 2 MICROgram(s)/kG/Min (4.84 mL/Hr) IV Continuous <Continuous>  dornase delilah Solution 2.5 milliGRAM(s) Inhalation two times a day  furosemide    Tablet 40 milliGRAM(s) Oral daily  heparin   Injectable 5000 Unit(s) SubCutaneous every 8 hours  lidocaine   4% Patch 1 Patch Transdermal daily  mirtazapine 7.5 milliGRAM(s) Oral at bedtime  pantoprazole  Injectable 40 milliGRAM(s) IV Push daily  polyethylene glycol 3350 17 Gram(s) Oral daily  senna 2 Tablet(s) Oral at bedtime  sodium chloride 3%  Inhalation 4 milliLiter(s) Inhalation every 12 hours  tamsulosin 0.4 milliGRAM(s) Oral at bedtime  venlafaxine XR. 150 milliGRAM(s) Oral daily    MEDICATIONS  (PRN):  HYDROmorphone  Injectable 0.25 milliGRAM(s) IV Push every 6 hours PRN Severe Pain (7 - 10)  melatonin 3 milliGRAM(s) Oral at bedtime PRN Insomnia  oxyCODONE    IR 5 milliGRAM(s) Oral every 6 hours PRN Moderate Pain (4 - 6)  zolpidem 5 milliGRAM(s) Oral at bedtime PRN Insomnia  zolpidem 5 milliGRAM(s) Oral at bedtime PRN Insomnia        PHYSICAL EXAM============================  General:                         Awake, alert, not in any distress  Neuro:                            Moving all extremities to commands.   Respiratory:	Air entry fair and  bilateral conducted sounds                                           Effort even and unlabored.  CV:		Regular rate and rhythm. Normal S1/S2                                          Distal pulses present.  Abdomen:	                     Soft, non-distended. Bowel sounds present   Skin:		No rash.  Extremities:	Warm, no cyanosis or edema.  Palpable pulses    ============================LABS=========================                        11.7   10.09 )-----------( 211      ( 27 Sep 2022 04:20 )             36.0     09-27    137  |  105  |  21  ----------------------------<  105<H>  4.3   |  23  |  0.87    Ca    8.4      27 Sep 2022 04:20  Phos  3.0     09-27  Mg     2.00     09-27    TPro  5.4<L>  /  Alb  2.9<L>  /  TBili  2.2<H>  /  DBili  x   /  AST  351<H>  /  ALT  652<H>  /  AlkPhos  203<H>  09-27    LIVER FUNCTIONS - ( 27 Sep 2022 04:20 )  Alb: 2.9 g/dL / Pro: 5.4 g/dL / ALK PHOS: 203 U/L / ALT: 652 U/L / AST: 351 U/L / GGT: x             ABG - ( 27 Sep 2022 04:20 )  pH, Arterial: 7.49  pH, Blood: x     /  pCO2: 32    /  pO2: 136   / HCO3: 24    / Base Excess: 1.6   /  SaO2: 98.2    < from: Transthoracic Echocardiogram (09.24.22 @ 14:33) >    Patient name: ELYSE MALAVE  YOB: 1945   Age: 77 (M)   MR#: [id number]  Study Date: 9/24/2022  [facility] Nathalie Grace RDCS  Study quality: Technically good  Referring Physician: Stevie Padilla MD  Blood Pressure: 144/65 mmHg  Height: 181 cm  Weight: 80 kg  BSA: 2 m2  ------------------------------------------------------------------------  PROCEDURE: Transthoracic echocardiogram with 2-D, M-Mode  and complete spectral and color flow Doppler.  INDICATION: ST elevation (STEMI) myocardial infarction of  unspecified site (I21.3)  ------------------------------------------------------------------------  DIMENSIONS:  Dimensions:     Normal Values:  LA:     4.1 cm    2.0 - 4.0 cm  Ao:     3.7 cm    2.0 - 3.8 cm  SEPTUM: 1.2 cm    0.6 - 1.2 cm  PWT:    1.2 cm    0.6 - 1.1 cm  LVIDd:  6.1 cm    3.0 - 5.6 cm  LVIDs:    ---     1.8 - 4.0 cm  Derived Variables:  LVMI: 161 g/m2  RWT: 0.39  Ejection Fraction (Modified Pizarro Rule): 34 %  ------------------------------------------------------------------------  OBSERVATIONS:  Mitral Valve: Mitral annular calcification, otherwise  normal mitral valve. Moderate mitral regurgitation.  Aortic Root: Normal aortic root.  Aortic Valve: Calcified trileaflet aortic valve with normal  opening. Mild aortic regurgitation.  Left Atrium: Normal left atrium.  LA volume index = 28  cc/m2.  Left Ventricle: Severe global left ventricular systolic  dysfunction. Severe left ventricular enlargement. Moderate  diastolic dysfunction (Stage II).  Right Heart: Normal right atrium. Right ventricular  enlargement with normal right ventricular systolic  function. Normal tricuspid valve.    Moderate tricuspid  regurgitation. Normal pulmonic valve. Mild pulmonic  regurgitation.  Pericardium/PleuraNormal pericardium with no pericardial  effusion.  Hemodynamic: Estimated right ventricular systolic pressure  equals 82 mm Hg, assuming right atrial pressure equals 10  mm Hg, consistent with severe pulmonary hypertension.  ------------------------------------------------------------------------  CONCLUSIONS:  1. Mitral annular calcification, otherwise normal mitral  valve. Moderate mitral regurgitation.  2. Severe left ventricular enlargement.  3. Severe global left ventricularsystolic dysfunction.  4. Moderate diastolic dysfunction (Stage II).  5. Right ventricular enlargement with normal right  ventricular systolic function.  6. Estimated pulmonary artery systolic pressure equals 82  mm Hg, assuming right atrial pressureequals 10  mm Hg,  consistent with severe pulmonary hypertension.        A/P:  77yMale s/p FB, R VATS, R Upper Lobectomy. 23-Sep-2022, experiencing  pain with deep breathing.                             Neuro:                                         Pain control with  Dilaudid /  Tylenol / Oxy  PRN    Bipolar disorder / Anxiety / Depression: Continue mirtazapine                             Cardiovascular:      Cardiogenic shock: On Dobutamine support, SCVO2 56-67%, Monitor SCVO2, CVP  Clinically pt is not showing signs of decompensation  2D Echo on 9/24:  Severe global left ventricular systolic dysfunction, Moderate diastolic dysfunction (Stage II), Right ventricular enlargement with normal right ventricular systolic function    Wean Dobutamine and check BMP / Lactate , monitor CVP, SCVO2  Continue gentle diuresis and monitor renal function  Cardiology f/u. CHF team consulted                                           CAD: Continue ASA 81mg. Hold BB  HLD: Lipitor on hold because of shock liver. Trend LFTs.  Telemetry (medical test) - Reviewed by me today independently. Normal sinus rhythm /  with some PVCs .                                          Continue hemodynamic monitoring to prevent decompensation.                            Respiratory:                                         Postop hypoxemia requiring O2 via nasal cannula probably due to postop pain - Wean nasal cannula for goal O2sat above 92%.                                              Obtain CXR . Encourage incentive spirometry.                                                   Chest PT and frequent suctioning. Continue bronchodilators, Pulmozyme and inhaled 3% saline inhalations.                                                      OOB to chair & ambulate w/ assistance.                                                           Continuous pulse oximetry for support & to prevent decompensation.                                         Monitor chest tube output                                         Chest tube to  water seal                                                                                           GI                                         On DASH  diet as tolerated                                         Continue Zofran / Reglan for nausea - PRN                                         Continue bowel regimen     Shock liver: LFTs are trending down	                                                                 Renal:                                                                                  Monitor I/Os and electrolytes     Continue gentle diuresis with Lasix, add aldactone for CHF     BPH: Continue Flomax                                                                                        Hem/ Onc:                                         DVT prophylaxis with SQ Heparin and SCDs                                         Monitor chest tube output &  signs of bleeding.                                          Follow CBC in AM                           Infectious disease:                                            Monitor for fever / leukocytosis.                                          All surgical incision / chest tube  sites look clean                            Endocrine                                             Continue Accu-Checks with coverage                                                 Pertinent clinical, laboratory, radiographic, hemodynamic, echocardiographic, respiratory data, microbiologic data and chart were reviewed and analyzed frequently throughout the course of the day and night.     Patient seen, examined and plan discussed with CT Surgeon Dr. Padilla / CTICU team during rounds.     Status discussed with patient and updated plan of care.     I have spent  40 minutes of critical care time with this pt between  7am and 11.59pm monitoring hemodynamic status, managing inotrope to prevent  worsening of shock.           Kerrie AGOSTOP

## 2022-09-27 NOTE — CONSULT NOTE ADULT - NS ATTEND AMEND GEN_ALL_CORE FT
Excellent UO and nl CVP.  Monitor CVP and CV sat though central line.  If estimated CI by Swapnil > 2.2, will consider stopping dobutamine.  Pt may need RHC to evaluate for home inotropic therapy.  Eventually will need to be initiated on neurohormonal blockers if BP allows.    We will follow.

## 2022-09-27 NOTE — DIETITIAN INITIAL EVALUATION ADULT - NSFNSGIIOFT_GEN_A_CORE
09-26-22 @ 07:01  -  09-27-22 @ 07:00  --------------------------------------------------------  OUT:    Chest Tube (mL): 380 mL  Total OUT: 380 mL    Total NET: -380 mL      09-27-22 @ 07:01  -  09-27-22 @ 11:42  --------------------------------------------------------  OUT:    Chest Tube (mL): 30 mL  Total OUT: 30 mL    Total NET: -30 mL

## 2022-09-27 NOTE — PROGRESS NOTE ADULT - ASSESSMENT
Assessment    77 yr old male presents with hx of bipolar, BPH, SCC of leg, CHF EF 25% severe segmental LV systolic dysfunction, CAD, hospitalized on May 2022 for acute MI s/p cardiac Cath with no intervention (revealed  RCA and 70% occlusion prox LAD, 1st diagonal 70% stenosis  Right VATs, right upper lobectomy 22  Hypotension sp IVF and sp pressors.  Hypotension from cardiogenic shock leading to BORDY - off pressors, remains on  gtt   Volume status is acceptable  - 2.6L UOP in 24 hours     1 Renal- BRODY is from cariogenic shock at present and he is on  gtt.  resolved   2 CVS-Severe global reduction in EF and on inotropes   3 Pulm- CXR improving, no effusions     Recommendations  - Wean inotropes as able  - Would hold off on diuretics- Patient is auto-diuresing  - C/w fluid restriction 1.2L per day   - Eventual Toprol and possibly Entresto once off the inotropes    [facility] ELDON  [facility]- Nephrology  [phone]    Assessment    77 yr old male presents with hx of bipolar, BPH, SCC of leg, CHF EF 25% severe segmental LV systolic dysfunction, CAD, hospitalized on May 2022 for acute MI s/p cardiac Cath with no intervention (revealed  RCA and 70% occlusion prox LAD, 1st diagonal 70% stenosis  Right VATs, right upper lobectomy 22  Hypotension sp IVF and sp pressors.  Hypotension from cardiogenic shock leading to BRODY - off pressors, remains on  gtt   Volume status is acceptable  - 2.6L UOP in 24 hours     1 Renal- BRODY is from cariogenic shock at present and he is on  gtt.  resolved s/p lasix 10 mg IV x1 yesterday   2 CVS-Severe global reduction in EF and on inotropes   3 Pulm- CXR improving, no effusions     Recommendations  - Wean inotropes as able  - Would hold off on diuretics- Patient is auto-diuresing  - C/w fluid restriction 1.2L per day   - Eventual Toprol and possibly Entresto once off the inotropes    Ching Aranda NP  [facility]- Nephrology  [phone]    Assessment    77 yr old male presents with hx of bipolar, BPH, SCC of leg, CHF EF 25% severe segmental LV systolic dysfunction, CAD, hospitalized on May 2022 for acute MI s/p cardiac Cath with no intervention (revealed  RCA and 70% occlusion prox LAD, 1st diagonal 70% stenosis  Right VATs, right upper lobectomy 22  Hypotension sp IVF and sp pressors.  Hypotension from cardiogenic shock leading to BRODY - off pressors, remains on  gtt   Volume status is acceptable  - 2.6L UOP in 24 hours     1 Renal- BRODY is from cariogenic shock at present and he is on  gtt.  resolved s/p lasix 10 mg IV x1 yesterday   2 CVS-Severe global reduction in EF and on inotropes   3 Pulm- CXR improving, no effusions     Recommendations  - Wean inotropes as able.  On 1 mcg now   - Would hold off on diuretics- Patient is auto-diuresing  - C/w fluid restriction 1.2L per day   - Eventual Toprol and possibly Entresto once off the inotropes    Ching Aranda NP  [facility]- Nephrology  [phone]

## 2022-09-27 NOTE — PROGRESS NOTE ADULT - SUBJECTIVE AND OBJECTIVE BOX
NEPHROLOGY-[facility] [phone]        Patient seen and examined on room air, remains on  gtt.         MEDICATIONS  (STANDING):  albuterol/ipratropium for Nebulization 3 milliLiter(s) Nebulizer every 6 hours  aspirin enteric coated 81 milliGRAM(s) Oral daily  chlorhexidine 4% Liquid 1 Application(s) Topical <User Schedule>  DOBUTamine Infusion 2 MICROgram(s)/kG/Min (4.84 mL/Hr) IV Continuous <Continuous>  dornase delilah Solution 2.5 milliGRAM(s) Inhalation two times a day  heparin   Injectable 5000 Unit(s) SubCutaneous every 8 hours  lidocaine   4% Patch 1 Patch Transdermal daily  pantoprazole  Injectable 40 milliGRAM(s) IV Push daily  polyethylene glycol 3350 17 Gram(s) Oral daily  senna 2 Tablet(s) Oral at bedtime  sodium chloride 3%  Inhalation 4 milliLiter(s) Inhalation every 12 hours  tamsulosin 0.4 milliGRAM(s) Oral at bedtime      VITAL:  T(C): , Max: 37.1 (22 @ 16:00)  T(F): , Max: 98.8 (22 @ 16:00)  HR: 94 (22 @ 08:00)  BP: --  BP(mean): --  RR: 21 (22 @ 08:00)  SpO2: 99% (22 @ 08:00)  Wt(kg): --    I and O's:     @ 07:01  -   @ 07:00  --------------------------------------------------------  IN: 1012.3 mL / OUT: 3075 mL / NET: -2062.7 mL          PHYSICAL EXAM:    Constitutional: NAD  Chest: R CT   Neck:  No JVD  Respiratory: CTAB/L  Cardiovascular: S1 and S2  Gastrointestinal: BS+, soft, NT/ND  Extremities: No peripheral edema  Neurological: A/O x 3, no focal deficits  Psychiatric: Normal mood, normal affect  : + Chandler  Skin: No rashes  Access: Not applicable    LABS:                        11.7   10.09 )-----------( 211      ( 27 Sep 2022 04:20 )             36.0         137  |  105  |  21  ----------------------------<  105<H>  4.3   |  23  |  0.87    Ca    8.4      27 Sep 2022 04:20  Phos  3.0       Mg     2.00         TPro  5.4<L>  /  Alb  2.9<L>  /  TBili  2.2<H>  /  DBili  x   /  AST  351<H>  /  ALT  652<H>  /  AlkPhos  203<H>          RADIOLOGY & ADDITIONAL STUDIES:  < from: Xray Chest 1 View- PORTABLE-Routine (Xray Chest 1 View- PORTABLE-Routine in AM.) (22 @ 05:41) >    Frontal expiratory view of the chest shows the heart to be similar in   size. Right chest tube and right jugular catheter remain present.    The lungs show slight progression of small right pneumothorax and there   is no evidence of pleural effusion.    IMPRESSION:  Right pneumothorax.    < end of copied text >         NEPHROLOGY-[facility] [phone]        Patient seen and examined on room air, remains on  gtt.         MEDICATIONS  (STANDING):  albuterol/ipratropium for Nebulization 3 milliLiter(s) Nebulizer every 6 hours.  aspirin enteric coated 81 milliGRAM(s) Oral daily  chlorhexidine 4% Liquid 1 Application(s) Topical <User Schedule>  DOBUTamine Infusion 2 MICROgram(s)/kG/Min (4.84 mL/Hr) IV Continuous <Continuous>  dornase delilah Solution 2.5 milliGRAM(s) Inhalation two times a day  heparin   Injectable 5000 Unit(s) SubCutaneous every 8 hours  lidocaine   4% Patch 1 Patch Transdermal daily  pantoprazole  Injectable 40 milliGRAM(s) IV Push daily  polyethylene glycol 3350 17 Gram(s) Oral daily  senna 2 Tablet(s) Oral at bedtime  sodium chloride 3%  Inhalation 4 milliLiter(s) Inhalation every 12 hours  tamsulosin 0.4 milliGRAM(s) Oral at bedtime      VITAL:  T(C): , Max: 37.1 (22 @ 16:00)  T(F): , Max: 98.8 (22 @ 16:00)  HR: 94 (22 @ 08:00)  BP: --  BP(mean): --  RR: 21 (22 @ 08:00)  SpO2: 99% (22 @ 08:00).  Wt(kg): --    I and O's:     @ 07:01  -   @ 07:00  --------------------------------------------------------  IN: 1012.3 mL / OUT: 3075 mL / NET: -2062.7 mL          PHYSICAL EXAM:    Constitutional: NAD  Chest: R CT   Neck:  No JVD  Respiratory: CTAB/L.  Cardiovascular: S1 and S2  Gastrointestinal: BS+, soft, NT/ND  Extremities: No peripheral edema  Neurological: A/O x 3, no focal deficits  Psychiatric: Normal mood, normal affect  : + Chandler  Skin: No rashes  Access: Not applicable    LABS:                        11.7   10.09 )-----------( 211      ( 27 Sep 2022 04:20 )             36.0         137  |  105  |  21  ----------------------------<  105<H>  4.3   |  23  |  0.87    Ca    8.4      27 Sep 2022 04:20  Phos  3.0       Mg     2.00         TPro  5.4<L>  /  Alb  2.9<L>  /  TBili  2.2<H>  /  DBili  x   /  AST  351<H>  /  ALT  652<H>  /  AlkPhos  203<H>          RADIOLOGY & ADDITIONAL STUDIES:  < from: Xray Chest 1 View- PORTABLE-Routine (Xray Chest 1 View- PORTABLE-Routine in AM.) (22 @ 05:41) >    Frontal expiratory view of the chest shows the heart to be similar in   size. Right chest tube and right jugular catheter remain present.    The lungs show slight progression of small right pneumothorax and there   is no evidence of pleural effusion.    IMPRESSION:  Right pneumothorax.    < end of copied text >

## 2022-09-27 NOTE — CONSULT NOTE ADULT - ASSESSMENT
76 y/o male with PMHX of HFrEF (9/25/22 TTE LVEF 34%, LV 6.1cm, LA 4.1 cm, mod MR, moderate diastolic dysfunction, RV enlargement w/ normal function, mod TR, mild MI, RVSP 82 with severe pulmonary HTN), CAD w/ recent STEMI  5/11/23 w/ LHC w/ IABP showing  mid %, LAD 70%, D1 70%- medical management recommended. During that admission TTE showed LV thrombus and was started on Coumadin, with last coumadin dose reported 8/26/22. Additional hx includes bipolar disorder, BPH, SCC of leg, right upper lobe mass. Patient is in CTI s/p 9/23/22 right VATS and right upper lobectomy which was complicated by cardiogenic shock requiring dobutamine. Labs on 9/24 significant for elevation in LFTs AST 2768, ALT 1653, which has since improved on dobutamine. HF service consulted by general cardiology service to help manage care in this patient who is in cardiogenic shock on Dobutamine. Currently SBP currently 120s. CVP 6, with 2 L net negative fluids.     Cardiac studies:  9/25/22 TTE LVEF 34%, LV 6.1cm, LA 4.1 cm, mod MR, moderate diastolic dysfunction, RV enlargement w/ normal function, mod TR, mild MI, RVSP 82 with severe pulmonary HTN  5/11/23  LHC w/ IABP showing  mid %, LAD 70%, D1 70%- medical management recommended  [facility] /85/41, RA 15, PCWP 31, PA sat 59%, CO 3.63, CI 1.70, PVR 2.76.    76 y/o male with PMHX of HFrEF (9/25/22 TTE LVEF 34%, LV 6.1cm, LA 4.1 cm, mod MR, moderate diastolic dysfunction, RV enlargement w/ normal function, mod TR, mild UT, RVSP 82 with severe pulmonary HTN), CAD w/ recent STEMI  5/11/22 w/ LHC w/ IABP showing  mid %, LAD 70%, D1 70%- medical management recommended. During that admission TTE showed LV thrombus and was started on Coumadin, with last coumadin dose reported 8/26/22. Additional hx includes bipolar disorder, BPH, SCC of leg, right upper lobe mass. Patient is in CTI s/p 9/23/22 right VATS and right upper lobectomy which was complicated by cardiogenic shock requiring dobutamine. Labs on 9/24 significant for elevation in LFTs AST 2768, ALT 1653, which has since improved. HF service consulted by general cardiology service to help manage care in this patient who is in cardiogenic shock on Dobutamine. Currently SBP currently 120s. CVP 6, with 2 L net negative fluids. IV Lasix 10 mg x 1 was given on 9/26, with current Lasix 40 mg po qd. Patient reports walking 1/2 mile prior to admission w/o OGLESBY, and swimming 30 mins w/o difficulty.  No CP, palpitations, dizziness.     Cardiac studies:  9/25/22 TTE LVEF 34%, LV 6.1cm, LA 4.1 cm, mod MR, moderate diastolic dysfunction, RV enlargement w/ normal function, mod TR, mild UT, RVSP 82 with severe pulmonary HTN  5/11/23  LHC w/ IABP showing  mid %, LAD 70%, D1 70%- medical management recommended  RHC /85/41, RA 15, PCWP 31, PA sat 59%, CO 3.63, CI 1.70, PVR 2.76.

## 2022-09-28 LAB
ANION GAP SERPL CALC-SCNC: 12 MMOL/L — SIGNIFICANT CHANGE UP (ref 7–14)
BASE EXCESS BLDV CALC-SCNC: 3.7 MMOL/L — HIGH (ref -2–3)
BLOOD GAS ARTERIAL - LYTES,HGB,ICA,LACT RESULT: SIGNIFICANT CHANGE UP
BLOOD GAS VENOUS COMPREHENSIVE RESULT: SIGNIFICANT CHANGE UP
BUN SERPL-MCNC: 18 MG/DL — SIGNIFICANT CHANGE UP (ref 7–23)
CALCIUM SERPL-MCNC: 8.5 MG/DL — SIGNIFICANT CHANGE UP (ref 8.4–10.5)
CHLORIDE SERPL-SCNC: 101 MMOL/L — SIGNIFICANT CHANGE UP (ref 98–107)
CO2 BLDV-SCNC: 29.8 MMOL/L — HIGH (ref 22–26)
CO2 SERPL-SCNC: 22 MMOL/L — SIGNIFICANT CHANGE UP (ref 22–31)
CREAT SERPL-MCNC: 0.79 MG/DL — SIGNIFICANT CHANGE UP (ref 0.5–1.3)
EGFR: 92 ML/MIN/1.73M2 — SIGNIFICANT CHANGE UP
GLUCOSE SERPL-MCNC: 112 MG/DL — HIGH (ref 70–99)
HCO3 BLDV-SCNC: 28 MMOL/L — SIGNIFICANT CHANGE UP (ref 22–29)
HCT VFR BLD CALC: 36.4 % — LOW (ref 39–50)
HGB BLD-MCNC: 11.8 G/DL — LOW (ref 13–17)
MAGNESIUM SERPL-MCNC: 2.1 MG/DL — SIGNIFICANT CHANGE UP (ref 1.6–2.6)
MCHC RBC-ENTMCNC: 30.5 PG — SIGNIFICANT CHANGE UP (ref 27–34)
MCHC RBC-ENTMCNC: 32.4 GM/DL — SIGNIFICANT CHANGE UP (ref 32–36)
MCV RBC AUTO: 94.1 FL — SIGNIFICANT CHANGE UP (ref 80–100)
NRBC # BLD: 0 /100 WBCS — SIGNIFICANT CHANGE UP (ref 0–0)
NRBC # FLD: 0 K/UL — SIGNIFICANT CHANGE UP (ref 0–0)
PCO2 BLDV: 43 MMHG — SIGNIFICANT CHANGE UP (ref 42–55)
PH BLDV: 7.43 — SIGNIFICANT CHANGE UP (ref 7.32–7.43)
PHOSPHATE SERPL-MCNC: 2.9 MG/DL — SIGNIFICANT CHANGE UP (ref 2.5–4.5)
PLATELET # BLD AUTO: 231 K/UL — SIGNIFICANT CHANGE UP (ref 150–400)
PO2 BLDV: 39 MMHG — SIGNIFICANT CHANGE UP
POTASSIUM SERPL-MCNC: 4.1 MMOL/L — SIGNIFICANT CHANGE UP (ref 3.5–5.3)
POTASSIUM SERPL-SCNC: 4.1 MMOL/L — SIGNIFICANT CHANGE UP (ref 3.5–5.3)
RBC # BLD: 3.87 M/UL — LOW (ref 4.2–5.8)
RBC # FLD: 15.9 % — HIGH (ref 10.3–14.5)
SAO2 % BLDV: 61.8 % — SIGNIFICANT CHANGE UP
SODIUM SERPL-SCNC: 135 MMOL/L — SIGNIFICANT CHANGE UP (ref 135–145)
SURGICAL PATHOLOGY STUDY: SIGNIFICANT CHANGE UP
WBC # BLD: 9.73 K/UL — SIGNIFICANT CHANGE UP (ref 3.8–10.5)
WBC # FLD AUTO: 9.73 K/UL — SIGNIFICANT CHANGE UP (ref 3.8–10.5)

## 2022-09-28 PROCEDURE — 99233 SBSQ HOSP IP/OBS HIGH 50: CPT | Mod: FS

## 2022-09-28 PROCEDURE — 71045 X-RAY EXAM CHEST 1 VIEW: CPT | Mod: 26

## 2022-09-28 PROCEDURE — 99291 CRITICAL CARE FIRST HOUR: CPT

## 2022-09-28 RX ORDER — DOBUTAMINE HCL 250MG/20ML
1 VIAL (ML) INTRAVENOUS
Qty: 500 | Refills: 0 | Status: DISCONTINUED | OUTPATIENT
Start: 2022-09-28 | End: 2022-09-28

## 2022-09-28 RX ORDER — SACUBITRIL AND VALSARTAN 24; 26 MG/1; MG/1
1 TABLET, FILM COATED ORAL
Refills: 0 | Status: DISCONTINUED | OUTPATIENT
Start: 2022-09-28 | End: 2022-10-01

## 2022-09-28 RX ORDER — DOBUTAMINE HCL 250MG/20ML
1 VIAL (ML) INTRAVENOUS
Qty: 500 | Refills: 0 | Status: DISCONTINUED | OUTPATIENT
Start: 2022-09-28 | End: 2022-09-29

## 2022-09-28 RX ORDER — LACTULOSE 10 G/15ML
10 SOLUTION ORAL EVERY 8 HOURS
Refills: 0 | Status: COMPLETED | OUTPATIENT
Start: 2022-09-28 | End: 2022-09-30

## 2022-09-28 RX ORDER — POLYETHYLENE GLYCOL 3350 17 G/17G
17 POWDER, FOR SOLUTION ORAL DAILY
Refills: 0 | Status: DISCONTINUED | OUTPATIENT
Start: 2022-09-28 | End: 2022-10-01

## 2022-09-28 RX ADMIN — LACTULOSE 10 GRAM(S): 10 SOLUTION ORAL at 21:20

## 2022-09-28 RX ADMIN — HEPARIN SODIUM 5000 UNIT(S): 5000 INJECTION INTRAVENOUS; SUBCUTANEOUS at 21:20

## 2022-09-28 RX ADMIN — HEPARIN SODIUM 5000 UNIT(S): 5000 INJECTION INTRAVENOUS; SUBCUTANEOUS at 05:27

## 2022-09-28 RX ADMIN — DORNASE ALFA 2.5 MILLIGRAM(S): 1 SOLUTION RESPIRATORY (INHALATION) at 10:37

## 2022-09-28 RX ADMIN — LIDOCAINE 1 PATCH: 4 CREAM TOPICAL at 11:50

## 2022-09-28 RX ADMIN — LIDOCAINE 1 PATCH: 4 CREAM TOPICAL at 19:21

## 2022-09-28 RX ADMIN — ZOLPIDEM TARTRATE 5 MILLIGRAM(S): 10 TABLET ORAL at 01:31

## 2022-09-28 RX ADMIN — Medication 81 MILLIGRAM(S): at 11:50

## 2022-09-28 RX ADMIN — LIDOCAINE 1 PATCH: 4 CREAM TOPICAL at 23:42

## 2022-09-28 RX ADMIN — LIDOCAINE 1 PATCH: 4 CREAM TOPICAL at 00:28

## 2022-09-28 RX ADMIN — TAMSULOSIN HYDROCHLORIDE 0.4 MILLIGRAM(S): 0.4 CAPSULE ORAL at 21:20

## 2022-09-28 RX ADMIN — DORNASE ALFA 2.5 MILLIGRAM(S): 1 SOLUTION RESPIRATORY (INHALATION) at 22:54

## 2022-09-28 RX ADMIN — Medication 40 MILLIGRAM(S): at 05:27

## 2022-09-28 RX ADMIN — SODIUM CHLORIDE 4 MILLILITER(S): 9 INJECTION INTRAMUSCULAR; INTRAVENOUS; SUBCUTANEOUS at 22:54

## 2022-09-28 RX ADMIN — SODIUM CHLORIDE 4 MILLILITER(S): 9 INJECTION INTRAMUSCULAR; INTRAVENOUS; SUBCUTANEOUS at 10:34

## 2022-09-28 RX ADMIN — PANTOPRAZOLE SODIUM 40 MILLIGRAM(S): 20 TABLET, DELAYED RELEASE ORAL at 11:51

## 2022-09-28 RX ADMIN — LACTULOSE 10 GRAM(S): 10 SOLUTION ORAL at 15:22

## 2022-09-28 RX ADMIN — HEPARIN SODIUM 5000 UNIT(S): 5000 INJECTION INTRAVENOUS; SUBCUTANEOUS at 15:22

## 2022-09-28 RX ADMIN — Medication 2.42 MICROGRAM(S)/KG/MIN: at 11:51

## 2022-09-28 RX ADMIN — SENNA PLUS 2 TABLET(S): 8.6 TABLET ORAL at 21:20

## 2022-09-28 RX ADMIN — ZOLPIDEM TARTRATE 5 MILLIGRAM(S): 10 TABLET ORAL at 00:06

## 2022-09-28 RX ADMIN — POLYETHYLENE GLYCOL 3350 17 GRAM(S): 17 POWDER, FOR SOLUTION ORAL at 11:51

## 2022-09-28 RX ADMIN — MIRTAZAPINE 7.5 MILLIGRAM(S): 45 TABLET, ORALLY DISINTEGRATING ORAL at 21:19

## 2022-09-28 RX ADMIN — Medication 3 MILLILITER(S): at 10:33

## 2022-09-28 RX ADMIN — SACUBITRIL AND VALSARTAN 1 TABLET(S): 24; 26 TABLET, FILM COATED ORAL at 21:20

## 2022-09-28 RX ADMIN — Medication 150 MILLIGRAM(S): at 16:28

## 2022-09-28 RX ADMIN — Medication 3 MILLILITER(S): at 22:54

## 2022-09-28 NOTE — PROGRESS NOTE ADULT - NS ATTEND AMEND GEN_ALL_CORE FT
CV sat 56% on lower dose of dobutamine.  Start Entresto 24/26 mg po bid.  Will need evaluation for coronary revascularization after acute surgical issues have resolved. CV sat 56% on lower dose of dobutamine. Dobutamine turned off. Recheck CV sat.  Start Entresto 24/26 mg po bid.  Will need evaluation for coronary revascularization after acute surgical issues have resolved.

## 2022-09-28 NOTE — PROGRESS NOTE ADULT - SUBJECTIVE AND OBJECTIVE BOX
Interval History:  Patient resting comfortably in bed   Denies CP/SOB/palpitations/dizziness  No acute events overnight.      Medications:  albuterol/ipratropium for Nebulization 3 milliLiter(s) Nebulizer every 6 hours  aspirin enteric coated 81 milliGRAM(s) Oral daily  chlorhexidine 4% Liquid 1 Application(s) Topical <User Schedule>  DOBUTamine Infusion 1 MICROgram(s)/kG/Min IV Continuous <Continuous>  dornase delilah Solution 2.5 milliGRAM(s) Inhalation two times a day  furosemide    Tablet 40 milliGRAM(s) Oral daily  heparin   Injectable 5000 Unit(s) SubCutaneous every 8 hours  HYDROmorphone  Injectable 0.25 milliGRAM(s) IV Push every 6 hours PRN  lidocaine   4% Patch 1 Patch Transdermal daily  melatonin 3 milliGRAM(s) Oral at bedtime PRN  mirtazapine 7.5 milliGRAM(s) Oral at bedtime  oxyCODONE    IR 5 milliGRAM(s) Oral every 6 hours PRN  pantoprazole  Injectable 40 milliGRAM(s) IV Push daily  polyethylene glycol 3350 17 Gram(s) Oral daily  polyethylene glycol 3350 17 Gram(s) Oral daily PRN  senna 2 Tablet(s) Oral at bedtime  sodium chloride 3%  Inhalation 4 milliLiter(s) Inhalation every 12 hours  tamsulosin 0.4 milliGRAM(s) Oral at bedtime  venlafaxine XR. 150 milliGRAM(s) Oral daily  zolpidem 5 milliGRAM(s) Oral at bedtime PRN  zolpidem 5 milliGRAM(s) Oral at bedtime PRN      Vitals:  T(C): 36.9 (22 @ 08:00), Max: 36.9 (22 @ 08:00)  HR: 95 (22 @ 12:00) (76 - 102)  BP: --  BP(mean): --  RR: 18 (22 @ 12:00) (13 - 28)  SpO2: 100% (22 @ 12:00) (97% - 100%)    Daily     Daily Weight in k.1 (28 Sep 2022 04:00)        I&O's Summary    27 Sep 2022 07:01  -  28 Sep 2022 07:00  --------------------------------------------------------  IN: 1120.8 mL / OUT: 3020 mL / NET: -1899.2 mL    28 Sep 2022 07:01  -  28 Sep 2022 13:12  --------------------------------------------------------  IN: 252 mL / OUT: 635 mL / NET: -383 mL        Physical Exam:  Appearance: No Acute Distress  Neck: JVP  Cardiovascular: Normal S1 S2  Respiratory: Clear to auscultation bilaterally  Gastrointestinal: Soft, Non-tender	  Skin: No cyanosis	  Neurologic: Non-focal  Extremities: No LE edema  Psychiatry: A & O x 3, Mood & affect appropriate    Labs:                        11.8   9.73  )-----------( 231      ( 28 Sep 2022 04:50 )             36.4         135  |  101  |  18  ----------------------------<  112<H>  4.1   |  22  |  0.79    Ca    8.5      28 Sep 2022 04:50  Phos  2.9       Mg     2.10         TPro  5.4<L>  /  Alb  2.9<L>  /  TBili  2.2<H>  /  DBili  x   /  AST  351<H>  /  ALT  652<H>  /  AlkPhos  203<H>              TELEMETRY:    Echocardiogram:   Interval History:  Patient resting comfortably in bed   Denies CP/SOB/palpitations/dizziness  No acute events overnight.      Medications:  albuterol/ipratropium for Nebulization 3 milliLiter(s) Nebulizer every 6 hours  aspirin enteric coated 81 milliGRAM(s) Oral daily  chlorhexidine 4% Liquid 1 Application(s) Topical <User Schedule>  DOBUTamine Infusion 1 MICROgram(s)/kG/Min IV Continuous <Continuous>  dornase delilah Solution 2.5 milliGRAM(s) Inhalation two times a day  furosemide    Tablet 40 milliGRAM(s) Oral daily  heparin   Injectable 5000 Unit(s) SubCutaneous every 8 hours  HYDROmorphone  Injectable 0.25 milliGRAM(s) IV Push every 6 hours PRN  lidocaine   4% Patch 1 Patch Transdermal daily  melatonin 3 milliGRAM(s) Oral at bedtime PRN  mirtazapine 7.5 milliGRAM(s) Oral at bedtime  oxyCODONE    IR 5 milliGRAM(s) Oral every 6 hours PRN  pantoprazole  Injectable 40 milliGRAM(s) IV Push daily  polyethylene glycol 3350 17 Gram(s) Oral daily  polyethylene glycol 3350 17 Gram(s) Oral daily PRN  senna 2 Tablet(s) Oral at bedtime  sodium chloride 3%  Inhalation 4 milliLiter(s) Inhalation every 12 hours  tamsulosin 0.4 milliGRAM(s) Oral at bedtime  venlafaxine XR. 150 milliGRAM(s) Oral daily  zolpidem 5 milliGRAM(s) Oral at bedtime PRN  zolpidem 5 milliGRAM(s) Oral at bedtime PRN      Vitals:  T(C): 36.9 (22 @ 08:00), Max: 36.9 (22 @ 08:00)  HR: 95 (22 @ 12:00) (76 - 102)  BP: --  BP(mean): --  RR: 18 (22 @ 12:00) (13 - 28)  SpO2: 100% (22 @ 12:00) (97% - 100%)    Daily     Daily Weight in k.1 (28 Sep 2022 04:00)        I&O's Summary    27 Sep 2022 07:01  -  28 Sep 2022 07:00  --------------------------------------------------------  IN: 1120.8 mL / OUT: 3020 mL / NET: -1899.2 mL    28 Sep 2022 07:01  -  28 Sep 2022 13:12  --------------------------------------------------------  IN: 252 mL / OUT: 635 mL / NET: -383 mL        Physical Exam:  Appearance: No Acute Distress  Neck: JVP  Cardiovascular: Normal S1 S2  Respiratory: Clear to auscultation bilaterally  Gastrointestinal: Soft, Non-tender	  Skin: No cyanosis	  Neurologic: Non-focal  Extremities: No LE edema  Psychiatry: A & O x 3, Mood & affect appropriate    Labs:                        11.8   9.73  )-----------( 231      ( 28 Sep 2022 04:50 )             36.4         135  |  101  |  18  ----------------------------<  112<H>  4.1   |  22  |  0.79    Ca    8.5      28 Sep 2022 04:50  Phos  2.9       Mg     2.10         TPro  5.4<L>  /  Alb  2.9<L>  /  TBili  2.2<H>  /  DBili  x   /  AST  351<H>  /  ALT  652<H>  /  AlkPhos  203<H>          TELEMETRY: Sinus Rhythm     Echocardiogram:  Transthoracic Echocardiogram (22 @ 14:33)     DIMENSIONS:  Dimensions:     Normal Values:  LA:     4.1 cm    2.0 - 4.0 cm  Ao:     3.7 cm    2.0 - 3.8 cm  SEPTUM: 1.2 cm    0.6 - 1.2 cm  PWT:    1.2 cm    0.6 - 1.1 cm  LVIDd:  6.1 cm    3.0 - 5.6 cm  LVIDs:    ---     1.8 - 4.0 cm  Derived Variables:  LVMI: 161 g/m2  RWT: 0.39  Ejection Fraction (Modified Pizarro Rule): 34 %  ------------------------------------------------------------------------  OBSERVATIONS:  Mitral Valve: Mitral annular calcification, otherwise  normal mitral valve. Moderate mitral regurgitation.  Aortic Root: Normal aortic root.  Aortic Valve: Calcified trileaflet aortic valve with normal  opening. Mild aortic regurgitation.  Left Atrium: Normal left atrium.  LA volume index = 28  cc/m2.  Left Ventricle: Severe global left ventricular systolic  dysfunction. Severe left ventricular enlargement. Moderate  diastolic dysfunction (Stage II).  Right Heart: Normal right atrium. Right ventricular  enlargement with normal right ventricular systolic  function. Normal tricuspid valve.    Moderate tricuspid  regurgitation. Normal pulmonic valve. Mild pulmonic  regurgitation.  Pericardium/PleuraNormal pericardium with no pericardial  effusion.  Hemodynamic: Estimated right ventricular systolic pressure  equals 82 mm Hg, assuming right atrial pressure equals 10  mm Hg, consistent with severe pulmonary hypertension.  ------------------------------------------------------------------------  CONCLUSIONS:  1. Mitral annular calcification, otherwise normal mitral  valve. Moderate mitral regurgitation.  2. Severe left ventricular enlargement.  3. Severe global left ventricularsystolic dysfunction.  4. Moderate diastolic dysfunction (Stage II).  5. Right ventricular enlargement with normal right  ventricular systolic function.  6. Estimated pulmonary artery systolic pressure equals 82  mm Hg, assuming right atrial pressureequals 10  mm Hg,  consistent with severe pulmonary hypertension.      Cardiac Catheterization (22 @ 15:16)   Hemodynamic Pressures:     Baseline      RV                  s      50  ed      17         81  Baseline      PA                  s      56                d      31  m   41         81  Baseline    PCW                 a      32                v      34  m   31         77  Baseline      RA                  a      20                v      16  m   15         95    CO              3.63 l/min    HR  O2Insp  CI                 1.70 l/min/m2    PO2    Systemic Resistances, Phase: Baseline   SVR              1917.35 dyn*s/cm5                  TVR  2247.93 dyn*s/cm5    PVR            220.39 dyn*s/cm5                   TPR  903.58 dyn*s/cm5  PVRI             469.69 dyn*s*m2/cm5                TPRI  1925.72 dyn*s*m2/cm5  PVR              2.76 MERCADO                  LM   Left main artery: Angiography shows no disease.      LAD   Proximal left anterior descending: There is a 70 % stenosis. First  diagonal: There is a 70 % stenosis.    CX   Circumflex: Angiography shows no disease.      RCA   Mid right coronary artery: There is a 100 % stenosis.       Interval History:  Patient resting comfortably in chair   Denies CP/SOB/palpitations/dizziness  No acute events overnight      Medications:  albuterol/ipratropium for Nebulization 3 milliLiter(s) Nebulizer every 6 hours  aspirin enteric coated 81 milliGRAM(s) Oral daily  chlorhexidine 4% Liquid 1 Application(s) Topical <User Schedule>  DOBUTamine Infusion 1 MICROgram(s)/kG/Min IV Continuous <Continuous>  dornase delilah Solution 2.5 milliGRAM(s) Inhalation two times a day  furosemide    Tablet 40 milliGRAM(s) Oral daily  heparin   Injectable 5000 Unit(s) SubCutaneous every 8 hours  HYDROmorphone  Injectable 0.25 milliGRAM(s) IV Push every 6 hours PRN  lidocaine   4% Patch 1 Patch Transdermal daily  melatonin 3 milliGRAM(s) Oral at bedtime PRN  mirtazapine 7.5 milliGRAM(s) Oral at bedtime  oxyCODONE    IR 5 milliGRAM(s) Oral every 6 hours PRN  pantoprazole  Injectable 40 milliGRAM(s) IV Push daily  polyethylene glycol 3350 17 Gram(s) Oral daily  polyethylene glycol 3350 17 Gram(s) Oral daily PRN  senna 2 Tablet(s) Oral at bedtime  sodium chloride 3%  Inhalation 4 milliLiter(s) Inhalation every 12 hours  tamsulosin 0.4 milliGRAM(s) Oral at bedtime  venlafaxine XR. 150 milliGRAM(s) Oral daily  zolpidem 5 milliGRAM(s) Oral at bedtime PRN  zolpidem 5 milliGRAM(s) Oral at bedtime PRN      Vitals:  T(C): 36.9 (22 @ 08:00), Max: 36.9 (22 @ 08:00)  HR: 95 (22 @ 12:00) (76 - 102)  BP: --  BP(mean): --  RR: 18 (22 @ 12:00) (13 - 28)  SpO2: 100% (22 @ 12:00) (97% - 100%)    Daily     Daily Weight in k.1 (28 Sep 2022 04:00)        I&O's Summary    27 Sep 2022 07:01  -  28 Sep 2022 07:00  --------------------------------------------------------  IN: 1120.8 mL / OUT: 3020 mL / NET: -1899.2 mL    28 Sep 2022 07:01  -  28 Sep 2022 13:12  --------------------------------------------------------  IN: 252 mL / OUT: 635 mL / NET: -383 mL        Physical Exam:  Appearance: No Acute Distress  Neck: JVP~8  Cardiovascular: Normal S1 S2  Respiratory: Clear to auscultation diminished bilateral bases R>L (CT H20 seal with small amount serosanguinous drainage)  Gastrointestinal: Soft, Non-tender	  Skin: No cyanosis	  Neurologic: Non-focal  Extremities: No LE edema  Psychiatry: A & O x 3, Mood & affect appropriate    Labs:                        11.8   9.73  )-----------( 231      ( 28 Sep 2022 04:50 )             36.4         135  |  101  |  18  ----------------------------<  112<H>  4.1   |  22  |  0.79    Ca    8.5      28 Sep 2022 04:50  Phos  2.9       Mg     2.10         TPro  5.4<L>  /  Alb  2.9<L>  /  TBili  2.2<H>  /  DBili  x   /  AST  351<H>  /  ALT  652<H>  /  AlkPhos  203<H>          TELEMETRY: Sinus Rhythm     Echocardiogram:  Transthoracic Echocardiogram (22 @ 14:33)     DIMENSIONS:  Dimensions:     Normal Values:  LA:     4.1 cm    2.0 - 4.0 cm  Ao:     3.7 cm    2.0 - 3.8 cm  SEPTUM: 1.2 cm    0.6 - 1.2 cm  PWT:    1.2 cm    0.6 - 1.1 cm  LVIDd:  6.1 cm    3.0 - 5.6 cm  LVIDs:    ---     1.8 - 4.0 cm  Derived Variables:  LVMI: 161 g/m2  RWT: 0.39  Ejection Fraction (Modified Pizarro Rule): 34 %  ------------------------------------------------------------------------  OBSERVATIONS:  Mitral Valve: Mitral annular calcification, otherwise  normal mitral valve. Moderate mitral regurgitation.  Aortic Root: Normal aortic root.  Aortic Valve: Calcified trileaflet aortic valve with normal  opening. Mild aortic regurgitation.  Left Atrium: Normal left atrium.  LA volume index = 28  cc/m2.  Left Ventricle: Severe global left ventricular systolic  dysfunction. Severe left ventricular enlargement. Moderate  diastolic dysfunction (Stage II).  Right Heart: Normal right atrium. Right ventricular  enlargement with normal right ventricular systolic  function. Normal tricuspid valve.    Moderate tricuspid  regurgitation. Normal pulmonic valve. Mild pulmonic  regurgitation.  Pericardium/PleuraNormal pericardium with no pericardial  effusion.  Hemodynamic: Estimated right ventricular systolic pressure  equals 82 mm Hg, assuming right atrial pressure equals 10  mm Hg, consistent with severe pulmonary hypertension.  ------------------------------------------------------------------------  CONCLUSIONS:  1. Mitral annular calcification, otherwise normal mitral  valve. Moderate mitral regurgitation.  2. Severe left ventricular enlargement.  3. Severe global left ventricularsystolic dysfunction.  4. Moderate diastolic dysfunction (Stage II).  5. Right ventricular enlargement with normal right  ventricular systolic function.  6. Estimated pulmonary artery systolic pressure equals 82  mm Hg, assuming right atrial pressureequals 10  mm Hg,  consistent with severe pulmonary hypertension.      Cardiac Catheterization (22 @ 15:16)   Hemodynamic Pressures:     Baseline      RV                  s      50  ed      17         81  Baseline      PA                  s      56                d      31  m   41         81  Baseline    PCW                 a      32                v      34  m   31         77  Baseline      RA                  a      20                v      16  m   15         95    CO              3.63 l/min    HR  O2Insp  CI                 1.70 l/min/m2    PO2    Systemic Resistances, Phase: Baseline   SVR              1917.35 dyn*s/cm5                  TVR  2247.93 dyn*s/cm5    PVR            220.39 dyn*s/cm5                   TPR  903.58 dyn*s/cm5  PVRI             469.69 dyn*s*m2/cm5                TPRI  1925.72 dyn*s*m2/cm5  PVR              2.76 MERCADO                  LM   Left main artery: Angiography shows no disease.      LAD   Proximal left anterior descending: There is a 70 % stenosis. First  diagonal: There is a 70 % stenosis.    CX   Circumflex: Angiography shows no disease.      RCA   Mid right coronary artery: There is a 100 % stenosis.

## 2022-09-28 NOTE — PROGRESS NOTE ADULT - SUBJECTIVE AND OBJECTIVE BOX
CHIEF COMPLAINT: FOLLOW UP IN ICU FOR POSTOPERATIVE CARE OF PATIENT WHO IS S/P  RUL lobectomy, cardiogenic shock          PROCEDURES:       FB, R VATS, R Upper Lobectomy. 23-Sep-2022         ISSUES:       Cardiogenic shock   Acute postop respiratory failure   CAD, biventricular failure, h/o LV thrombus   Hyperkalemia improved   Lactic acidosis, acidemic improved   Encephalopathy, likely cerebral hypoperfusion in shock state, improving   BRODY likely ATN, recovering   Transaminitis, shock liver - improved   Lung nodule   Postop pain   Chest tube in place            INTERVAL EVENTS:      on RA, walking in ICU, not dizzy  Dobutamine weaned to 1mcg/kg/min, making urine, on lasix 40mg po daily  Good appetite, complains of constipation    Chest tube to water seal, small airleak, 300ml output         HISTORY:      Good appetite, not dizzy now. Denies pain/nausea.         PHYSICAL EXAM:      Gen: Comfortable, No acute distress     Eyes: Sclera white, Conjunctiva normal, Eyelids normal, Pupils symmetrical      ENT: Mucous membranes moist,  ,  ,       Neck: Trachea midline,  ,  ,  ,  ,  ,       CV: Rate regular, Rhythm regular,  ,  ,       Resp: Breath sounds clear, No accessory muscles use, R chest tube in place,  ,       Abd: Soft, Non-distended, Non-tender, Bowel sounds normal,  ,  ,       Skin: Warm, No peripheral edema of lower extremities,  ,       :  gerardo     Neuro: Moving all 4 extremities,       Psych: A&Ox3               ASSESSMENT AND PLAN:           NEURO:     No active issues  Pain controlled          RESPIRATORY:     Hypoxia - wean o2 maintaining Spo2>92%, bronchodilators, OOb to chair, check CXR.    Chest tube – Pleurevac regulated water seal. Monitor chest tube output.                CARDIOVASCULAR:     Hemodynamically unstable, cardiogenic shock - Dobutamine was decreased to 1, monitor end organ perfusion (lactate/urine output/mental status), Scvo2    Telemetry (medical test) - Reviewed by me today independently. Normal sinus rhythm.     CAD - Lipitor held. Continue ASA, metoprolol            RENAL:     BRODY improved - trend urine output, BMP            GASTROINTESTINAL:       GI prophylaxis     Zofran and Reglan IV PRN for nausea     Tolerating diet    Shock liver, LFTs down trending        HEMATOLOGIC:     No signs of active bleeding. Monitor Hgb in CBC in AM     DVT prophylaxis with heparin subQ and SCDs. Continue ASA 81            INFECTIOUS DISEASE:     All surgical sites appear clean. No signs of active infection. Will monitor for fever and leukocytosis.           ENDOCRINE:     Stable – Monitor glucose fingersticks for goal 120-180.        ONCOLOGY:     Lung nodule - Improved. S/P resection. Follow up final pathology.         Pertinent clinical, laboratory, radiographic, hemodynamic, echocardiographic, respiratory data, microbiologic data and chart were reviewed by myself and analyzed frequently throughout the course of the day and night by myself.     Plan discussed at length with the CTICU staff and Attending CT Surgeon -   Dr Stevie Padilla    Patient's status was discussed with patient  at bedside.    _________________________  VITAL SIGNS:  Vital Signs Last 24 Hrs  T(C): 36.9 (28 Sep 2022 08:00), Max: 36.9 (28 Sep 2022 08:00)  T(F): 98.5 (28 Sep 2022 08:00), Max: 98.5 (28 Sep 2022 08:00)  HR: 93 (28 Sep 2022 09:00) (76 - 102)  BP: --  BP(mean): --  RR: 13 (28 Sep 2022 09:00) (13 - 28)  SpO2: 100% (28 Sep 2022 09:00) (97% - 100%)    Parameters below as of 28 Sep 2022 05:00  Patient On (Oxygen Delivery Method): room air      I/Os:   I&O's Detail    27 Sep 2022 07:01  -  28 Sep 2022 07:00  --------------------------------------------------------  IN:    DOBUTamine: 100.8 mL    Oral Fluid: 1020 mL  Total IN: 1120.8 mL    OUT:    Chest Tube (mL): 250 mL    Indwelling Catheter - Urethral (mL): 2770 mL  Total OUT: 3020 mL    Total NET: -1899.2 mL      28 Sep 2022 07:01  -  28 Sep 2022 10:51  --------------------------------------------------------  IN:    DOBUTamine: 2.4 mL    Oral Fluid: 120 mL  Total IN: 122.4 mL    OUT:    Chest Tube (mL): 45 mL    Indwelling Catheter - Urethral (mL): 275 mL  Total OUT: 320 mL    Total NET: -197.6 mL              MEDICATIONS:  MEDICATIONS  (STANDING):  albuterol/ipratropium for Nebulization 3 milliLiter(s) Nebulizer every 6 hours  aspirin enteric coated 81 milliGRAM(s) Oral daily  chlorhexidine 4% Liquid 1 Application(s) Topical <User Schedule>  DOBUTamine Infusion 1 MICROgram(s)/kG/Min (2.42 mL/Hr) IV Continuous <Continuous>  dornase delilah Solution 2.5 milliGRAM(s) Inhalation two times a day  furosemide    Tablet 40 milliGRAM(s) Oral daily  heparin   Injectable 5000 Unit(s) SubCutaneous every 8 hours  lidocaine   4% Patch 1 Patch Transdermal daily  mirtazapine 7.5 milliGRAM(s) Oral at bedtime  pantoprazole  Injectable 40 milliGRAM(s) IV Push daily  polyethylene glycol 3350 17 Gram(s) Oral daily  senna 2 Tablet(s) Oral at bedtime  sodium chloride 3%  Inhalation 4 milliLiter(s) Inhalation every 12 hours  tamsulosin 0.4 milliGRAM(s) Oral at bedtime  venlafaxine XR. 150 milliGRAM(s) Oral daily    MEDICATIONS  (PRN):  HYDROmorphone  Injectable 0.25 milliGRAM(s) IV Push every 6 hours PRN Severe Pain (7 - 10)  melatonin 3 milliGRAM(s) Oral at bedtime PRN Insomnia  oxyCODONE    IR 5 milliGRAM(s) Oral every 6 hours PRN Moderate Pain (4 - 6)  polyethylene glycol 3350 17 Gram(s) Oral daily PRN Constipation  zolpidem 5 milliGRAM(s) Oral at bedtime PRN Insomnia  zolpidem 5 milliGRAM(s) Oral at bedtime PRN Insomnia      LABS:  Laboratory data was independently reviewed by me today.                           11.8   9.73  )-----------( 231      ( 28 Sep 2022 04:50 )             36.4     09-28    135  |  101  |  18  ----------------------------<  112<H>  4.1   |  22  |  0.79    Ca    8.5      28 Sep 2022 04:50  Phos  2.9     09-28  Mg     2.10     09-28    TPro  5.4<L>  /  Alb  2.9<L>  /  TBili  2.2<H>  /  DBili  x   /  AST  351<H>  /  ALT  652<H>  /  AlkPhos  203<H>  09-27    LIVER FUNCTIONS - ( 27 Sep 2022 04:20 )  Alb: 2.9 g/dL / Pro: 5.4 g/dL / ALK PHOS: 203 U/L / ALT: 652 U/L / AST: 351 U/L / GGT: x             ABG - ( 27 Sep 2022 04:20 )  pH, Arterial: 7.49  pH, Blood: x     /  pCO2: 32    /  pO2: 136   / HCO3: 24    / Base Excess: 1.6   /  SaO2: 98.2                  RADIOLOGY:   Radiology images were independently reviewed by me today. Reports were reviewed by me today.    Xray Chest 1 View- PORTABLE-Urgent:   ACC: [id number] EXAM:  XR CHEST PORTABLE URGENT 1V                          PROCEDURE DATE:  09/28/2022          INTERPRETATION:  CLINICAL INFORMATION: Chest tube dislodged    TIME OF EXAMINATION: September 28 at 4:20 AM    EXAM: Portable chest    FINDINGS:  Right-sided chest tube with tip overlying the apex of this hemithorax   unchanged from the study of the day before. Mild loss of volume in the   right lung without interval change. Right IJ line in place stable.        COMPARISON: September 27        IMPRESSION: Follow-up post right thoracotomy with chest tube in place.    --- End of Report ---            SRAA KIMBLE MD; Attending Radiologist  This document has been electronically signed. Sep 28 2022  7:24AM (09-28-22 @ 04:35)  Xray Chest 1 View- PORTABLE-Routine:   ACC: [id number] EXAM:  XR CHEST PORTABLE ROUTINE 1V                          PROCEDURE DATE:  09/27/2022          INTERPRETATION:  Portable chest radiograph    CLINICAL INFORMATION: Postoperative chest radiograph    TECHNIQUE:  Portable  AP chest radiograph.    COMPARISON: None. .    FINDINGS:  CATHETERS AND TUBES: RIGHT chest tube tip overlies apex.  RIGHT IJ catheter tip in SVC.    PULMONARY: The visualized lungs are clear of airspace consolidations or   effusions.   No pneumothorax.    HEART/VASCULAR: Vascular heartbeat    BONES: Visualized osseous structures are intact.    IMPRESSION:   No interval change..    --- End of Report ---            SOL ALLEN MD; Attending Radiologist  This document has been electronically signed. Sep 27 2022 10:36AM (09-27-22 @ 05:43)  Xray Chest 1 View- PORTABLE-Routine:   ACC: [id number] EXAM:  XR CHEST PORTABLE ROUTINE 1V                          PROCEDURE DATE:  09/26/2022          INTERPRETATION:  Chest one view    HISTORY: Follow-up chest tube    COMPARISON STUDY: 9/25/2022    Frontal expiratory view of the chest shows the heart to be similar in   size. Right chest tube and right jugular catheter remain present.    The lungs show slight progression of small right pneumothorax and there   is no evidence of pleural effusion.    IMPRESSION:  Right pneumothorax.        Thank you for the courtesy of this referral.    --- End of Report ---            VIDA SAVAGE MD; Attending Interventional Radiologist  This document has been electronically signed. Sep 26 2022 11:38AM (09-26-22 @ 05:41)

## 2022-09-28 NOTE — PROGRESS NOTE ADULT - ASSESSMENT
ASSESSMENT:   77 yr old male presents with hx of bipolar, BPH, SCC of leg, CHF EF 25% severe segmental LV systolic dysfunction, CAD, hospitalized on May 2022 for acute MI s/p cardiac Cath with no intervention (revealed  RCA and 70% occlusion prox LAD, 1st diagonal 70% stenosis  Right VATs, right upper lobectomy 22  Hypotension sp IVF and sp pressors.  Hypotension from cardiogenic shock leading to BRODY - off pressors, remains on  gtt   Volume status is acceptable  - 2.6L UOP in 24 hours     1 Renal- BRODY is from cariogenic shock at present and he is on  gtt.  resolved s/p lasix 10 mg IV ()   2 CVS-Severe global reduction in EF and on inotropes   3 Pulm- CXR improving, no effusions     RECOMMENDATIONS:   - Wean inotropes as able.  On 1 mcg now   - Would hold off on diuretics- Patient is auto-diuresing  - C/w fluid restriction 1.2L per day   - Eventual Toprol and possibly Entresto once off the inotropes     ASSESSMENT:   77 yr old male presents with hx of bipolar, BPH, SCC of leg, CHF EF 25% severe segmental LV systolic dysfunction, CAD, hospitalized on May 2022 for acute MI s/p cardiac Cath with no intervention (revealed  RCA and 70% occlusion prox LAD, 1st diagonal 70% stenosis  Right VATs, right upper lobectomy 22  Hypotension sp IVF and sp pressors.  Hypotension from cardiogenic shock leading to BRODY - off pressors, remains on  gtt   Volume status is acceptable  - 2.6L UOP in 24 hours     1 Renal- BRODY is from cariogenic shock at present and he is on  gtt.  resolved s/p lasix 10 mg IV ()   2 CVS-Severe global reduction in EF and on inotropes   3 Pulm- CXR improving, no effusions     RECOMMENDATIONS:      ASSESSMENT:   77 yr old male presents with hx of bipolar, BPH, SCC of leg, CHF EF 25% severe segmental LV systolic dysfunction, CAD, hospitalized on May 2022 for acute MI s/p cardiac Cath with no intervention (revealed  RCA and 70% occlusion prox LAD, 1st diagonal 70% stenosis  Right VATs, right upper lobectomy 22  Hypotension sp IVF and sp pressors.  Hypotension from cardiogenic shock leading to BRODY - off pressors, remains on  gtt   Volume status is acceptable  - 2.6L UOP in 24 hours     1 Renal- BRODY is from cariogenic shock at present and he is on  gtt.  resolved s/p lasix 10 mg IV ()   2 CVS-Severe global reduction in EF and on inotropes   3 Pulm- CXR improving, no effusions     RECOMMENDATIONS:   - Wean inotropes as able.  On 1 mcg now   - on lasix 40 mg po daily   - C/w fluid restriction 1.2L per day   - Eventual Toprol and possibly Entresto once off the inotropes    D/w Dr. Arlene Calderon, NP-C  [facility]  [phone]

## 2022-09-28 NOTE — PROGRESS NOTE ADULT - ASSESSMENT
77 year old Male with PMH of bipolar disorder, CAD/STEMI  (5/2022) s/p R/LHC with IABP support which showed   mid %, LAD 70%, D1 70% and RA 15, PCWP 31, PA sat 59%, CO 3.63, CI 1.70, PVR 2.76; medical management recommended; TTE showed LV thrombus treated with Coumadin, HFrEF (EF 34%, LVIDd 6.1, moderate TR and severe PH), RUL mass s/p R. VATS (9/23/2022) c/b cardiogenic shock requiring inotropic support and diuretics. Pertinent labs:  AST 2768, ALT 1653 RHC:  RA 15, PCWP 31, PA sat 59%, CO 3.63, CI 1.70, PVR 2.76.     Dobutamine mcg/kg/min   Lasix 40mg daily   Strict I/O  Daily standing weights  Monitor lytes replete K>4.0 and Mg>2.0  BNP in AM  Management per CTICU team  Pending final recommendations from HF attending   77 year old Male with PMH of bipolar disorder, CAD/STEMI  (5/2022) s/p R/LHC with IABP support which showed   mid %, LAD 70%, D1 70% and RA 15, PCWP 31, PA sat 59%, CO 3.63, CI 1.70, PVR 2.76; medical management recommended; TTE showed LV thrombus treated with Coumadin, HFrEF (EF 34%, LVIDd 6.1, moderate TR and severe PH), RUL mass s/p R. VATS (9/23/2022) c/b cardiogenic shock requiring inotropic support and diuretics. Pertinent labs:  AST 2768, ALT 1653 RHC:  RA 15, PCWP 31, PA sat 59%, CO 3.63, CI 1.70, PVR 2.76.     Dobutamine 1mcg/kg/min; wean as tolerated   Please start Entresto 24/26mg twice daily    Lasix 40mg daily   Strict I/O  Daily standing weights  Monitor lytes replete K>4.0 and Mg>2.0  Trending  lactate   BNP in AM  Management per CTICU team  Pending final recommendations from HF attending   77 year old Male with PMH of bipolar disorder, CAD/STEMI  (5/2022) s/p R/LHC with IABP support which showed   mid %, LAD 70%, D1 70% and RA 15, PCWP 31, PA sat 59%, CO 3.63, CI 1.70, PVR 2.76; medical management recommended; TTE showed LV thrombus treated with Coumadin, HFrEF (EF 34%, LVIDd 6.1, moderate TR and severe PH), RUL mass s/p R. VATS (9/23/2022) c/b cardiogenic shock requiring inotropic support and diuretics. Pertinent labs:  AST 2768, ALT 1653 RHC:  RA 15, PCWP 31, PA sat 59%, CO 3.63, CI 1.70, PVR 2.76.     Dobutamine 1mcg/kg/min; wean as tolerated   Please start Entresto 24/26mg twice daily    Lasix 40mg daily   Strict I/O  Daily standing weights  Monitor lytes replete K>4.0 and Mg>2.0  Lactate = 1.3 and VSat 65.5% one hour post discontinuation of dobutamine   BNP in AM  Management per [facility]ICU team  Pending final recommendations from HF attending

## 2022-09-28 NOTE — PROGRESS NOTE ADULT - NS ATTEND AMEND GEN_ALL_CORE FT
Renal attd    -Remains on  as could not be stopped yesterday and will try again today  When off we can then start metoprolol succinate   Eventual entresto   Improving renal parameters and cont the lasix     Sayed [facility]   [phone]

## 2022-09-28 NOTE — PROGRESS NOTE ADULT - SUBJECTIVE AND OBJECTIVE BOX
NEPHROLOGY     Patient seen and examined.    MEDICATIONS  (STANDING):  albuterol/ipratropium for Nebulization 3 milliLiter(s) Nebulizer every 6 hours  aspirin enteric coated 81 milliGRAM(s) Oral daily  chlorhexidine 4% Liquid 1 Application(s) Topical <User Schedule>  DOBUTamine Infusion 1 MICROgram(s)/kG/Min (2.42 mL/Hr) IV Continuous <Continuous>  dornase delilah Solution 2.5 milliGRAM(s) Inhalation two times a day  furosemide    Tablet 40 milliGRAM(s) Oral daily  heparin   Injectable 5000 Unit(s) SubCutaneous every 8 hours  lidocaine   4% Patch 1 Patch Transdermal daily  mirtazapine 7.5 milliGRAM(s) Oral at bedtime  pantoprazole  Injectable 40 milliGRAM(s) IV Push daily  polyethylene glycol 3350 17 Gram(s) Oral daily  senna 2 Tablet(s) Oral at bedtime  sodium chloride 3%  Inhalation 4 milliLiter(s) Inhalation every 12 hours  tamsulosin 0.4 milliGRAM(s) Oral at bedtime  venlafaxine XR. 150 milliGRAM(s) Oral daily    VITALS:  T(C): , Max: 36.9 (09-28-22 @ 08:00)  T(F): , Max: 98.5 (09-28-22 @ 08:00)  HR: 95 (09-28-22 @ 12:00)  BP: --  RR: 18 (09-28-22 @ 12:00)  SpO2: 100% (09-28-22 @ 12:00)    I and O's:    09-27 @ 07:01  -  09-28 @ 07:00  --------------------------------------------------------  IN: 1120.8 mL / OUT: 3020 mL / NET: -1899.2 mL    09-28 @ 07:01  -  09-28 @ 12:23  --------------------------------------------------------  IN: 249.6 mL / OUT: 635 mL / NET: -385.4 mL    PHYSICAL EXAM:    LABS:                        11.8   9.73  )-----------( 231      ( 28 Sep 2022 04:50 )             36.4     09-28    135  |  101  |  18  ----------------------------<  112<H>  4.1   |  22  |  0.79    Ca    8.5      28 Sep 2022 04:50  Phos  2.9     09-28  Mg     2.10     09-28    TPro  5.4<L>  /  Alb  2.9<L>  /  TBili  2.2<H>  /  DBili  x   /  AST  351<H>  /  ALT  652<H>  /  AlkPhos  203<H>  09-27    RADIOLOGY & ADDITIONAL STUDIES:    ACC: [id number] EXAM:  XR CHEST PORTABLE URGENT 1V                          PROCEDURE DATE:  09/28/2022          INTERPRETATION:  CLINICAL INFORMATION: Chest tube dislodged    TIME OF EXAMINATION: September 28 at 4:20 AM    EXAM: Portable chest    FINDINGS:  Right-sided chest tube with tip overlying the apex of this hemithorax   unchanged from the study of the day before. Mild loss of volume in the   right lung without interval change. Right IJ line in place stable.      COMPARISON: September 27      IMPRESSION: Follow-up post right thoracotomy with chest tube in place.    --- End of Report ---    SARA KIMBLE MD; Attending Radiologist  This document has been electronically signed. Sep 28 2022  7:24AM   NEPHROLOGY     Patient seen and examined ambulating, remains on  gtt, complains of constipation, denies cp, no sob, comfortable on room air, in no acute distress.     MEDICATIONS  (STANDING):  albuterol/ipratropium for Nebulization 3 milliLiter(s) Nebulizer every 6 hours  aspirin enteric coated 81 milliGRAM(s) Oral daily  chlorhexidine 4% Liquid 1 Application(s) Topical <User Schedule>  DOBUTamine Infusion 1 MICROgram(s)/kG/Min (2.42 mL/Hr) IV Continuous <Continuous>  dornase delilah Solution 2.5 milliGRAM(s) Inhalation two times a day  furosemide    Tablet 40 milliGRAM(s) Oral daily  heparin   Injectable 5000 Unit(s) SubCutaneous every 8 hours  lidocaine   4% Patch 1 Patch Transdermal daily  mirtazapine 7.5 milliGRAM(s) Oral at bedtime  pantoprazole  Injectable 40 milliGRAM(s) IV Push daily  polyethylene glycol 3350 17 Gram(s) Oral daily  senna 2 Tablet(s) Oral at bedtime  sodium chloride 3%  Inhalation 4 milliLiter(s) Inhalation every 12 hours  tamsulosin 0.4 milliGRAM(s) Oral at bedtime  venlafaxine XR. 150 milliGRAM(s) Oral daily    VITALS:  T(C): , Max: 36.9 (22 @ 08:00)  T(F): , Max: 98.5 (22 @ 08:00)  HR: 95 (22 @ 12:00)  BP: --  RR: 18 (22 @ 12:00)  SpO2: 100% (22 @ 12:00)    I and O's:     @ 07:01  -   @ 07:00  --------------------------------------------------------  IN: 1120.8 mL / OUT: 3020 mL / NET: -1899.2 mL     @ 07:01  -   @ 12:23  --------------------------------------------------------  IN: 249.6 mL / OUT: 635 mL / NET: -385.4 mL    PHYSICAL EXAM:  Constitutional: NAD  Chest: + R CT   Neck:  No JVD  Respiratory: CTAB/L  Cardiovascular: S1 and S2  Gastrointestinal: BS+, soft, NT/ND  Extremities: No peripheral edema  Neurological: A/O x 3, no focal deficits  Psychiatric: Normal mood, normal affect  : + Chandler  Skin: No rashes    LABS:                        11.8   9.73  )-----------( 231      ( 28 Sep 2022 04:50 )             36.4         135  |  101  |  18  ----------------------------<  112<H>  4.1   |  22  |  0.79    Ca    8.5      28 Sep 2022 04:50  Phos  2.9       Mg     2.10         TPro  5.4<L>  /  Alb  2.9<L>  /  TBili  2.2<H>  /  DBili  x   /  AST  351<H>  /  ALT  652<H>  /  AlkPhos  203<H>      RADIOLOGY & ADDITIONAL STUDIES:    ACC: [id number] EXAM:  XR CHEST PORTABLE URGENT 1V                          PROCEDURE DATE:  2022          INTERPRETATION:  CLINICAL INFORMATION: Chest tube dislodged    TIME OF EXAMINATION:  at 4:20 AM    EXAM: Portable chest    FINDINGS:  Right-sided chest tube with tip overlying the apex of this hemithorax   unchanged from the study of the day before. Mild loss of volume in the   right lung without interval change. Right IJ line in place stable.      COMPARISON:       IMPRESSION: Follow-up post right thoracotomy with chest tube in place.    --- End of Report ---    SARA KIMBLE MD; Attending Radiologist  This document has been electronically signed. Sep 28 2022  7:24AM   NEPHROLOGY     Patient seen and examined ambulating, remains on  gtt, complains of constipation, denies cp, no sob, comfortable on room air, in no acute distress.     MEDICATIONS  (STANDING):  albuterol/ipratropium for Nebulization 3 milliLiter(s) Nebulizer every 6 hours  aspirin enteric coated 81 milliGRAM(s) Oral daily  chlorhexidine 4% Liquid 1 Application(s) Topical <User Schedule>  DOBUTamine Infusion 1 MICROgram(s)/kG/Min (2.42 mL/Hr) IV Continuous <Continuous>  dornase delilah Solution 2.5 milliGRAM(s) Inhalation two times a day  furosemide    Tablet 40 milliGRAM(s) Oral daily  heparin   Injectable 5000 Unit(s) SubCutaneous every 8 hours  lidocaine   4% Patch 1 Patch Transdermal daily  mirtazapine 7.5 milliGRAM(s) Oral at bedtime  pantoprazole  Injectable 40 milliGRAM(s) IV Push daily  polyethylene glycol 3350 17 Gram(s) Oral daily.  senna 2 Tablet(s) Oral at bedtime  sodium chloride 3%  Inhalation 4 milliLiter(s) Inhalation every 12 hours  tamsulosin 0.4 milliGRAM(s) Oral at bedtime  venlafaxine XR. 150 milliGRAM(s) Oral daily    VITALS:  T(C): , Max: 36.9 (22 @ 08:00)  T(F): , Max: 98.5 (22 @ 08:00)  HR: 95 (22 @ 12:00)  BP: --  RR: 18 (22 @ 12:00)  SpO2: 100% (22 @ 12:00)    I and O's:     @ 07:01  -   @ 07:00  --------------------------------------------------------  IN: 1120.8 mL / OUT: 3020 mL / NET: -1899.2 mL     @ 07:01  -   @ 12:23  --------------------------------------------------------  IN: 249.6 mL / OUT: 635 mL / NET: -385.4 mL    PHYSICAL EXAM:  Constitutional: NAD  Chest: + R CT   Neck:  No JVD  Respiratory: CTAB/L  Cardiovascular: S1 and S2  Gastrointestinal: BS+, soft, NT/ND  Extremities: No peripheral edema  Neurological: A/O x 3, no focal deficits.  Psychiatric: Normal mood, normal affect  : + Chandler  Skin: No rashes    LABS:                        11.8   9.73  )-----------( 231      ( 28 Sep 2022 04:50 )             36.4         135  |  101  |  18  ----------------------------<  112<H>  4.1   |  22  |  0.79    Ca    8.5      28 Sep 2022 04:50  Phos  2.9       Mg     2.10         TPro  5.4<L>  /  Alb  2.9<L>  /  TBili  2.2<H>  /  DBili  x   /  AST  351<H>  /  ALT  652<H>  /  AlkPhos  203<H>      RADIOLOGY & ADDITIONAL STUDIES:    ACC: [id number] EXAM:  XR CHEST PORTABLE URGENT 1V                          PROCEDURE DATE:  2022          INTERPRETATION:  CLINICAL INFORMATION: Chest tube dislodged    TIME OF EXAMINATION:  at 4:20 AM    EXAM: Portable chest    FINDINGS:  Right-sided chest tube with tip overlying the apex of this hemithorax   unchanged from the study of the day before. Mild loss of volume in the   right lung without interval change. Right IJ line in place stable.      COMPARISON:       IMPRESSION: Follow-up post right thoracotomy with chest tube in place.    --- End of Report ---    SARA KIMBLE MD; Attending Radiologist  This document has been electronically signed. Sep 28 2022  7:24AM

## 2022-09-29 LAB
ANION GAP SERPL CALC-SCNC: 10 MMOL/L — SIGNIFICANT CHANGE UP (ref 7–14)
BASE EXCESS BLDV CALC-SCNC: -0.3 MMOL/L — SIGNIFICANT CHANGE UP (ref -2–3)
BASE EXCESS BLDV CALC-SCNC: -1 MMOL/L — SIGNIFICANT CHANGE UP (ref -2–3)
BASE EXCESS BLDV CALC-SCNC: -1 MMOL/L — SIGNIFICANT CHANGE UP (ref -2–3)
BASE EXCESS BLDV CALC-SCNC: 0.7 MMOL/L — SIGNIFICANT CHANGE UP (ref -2–3)
BLOOD GAS ARTERIAL - LYTES,HGB,ICA,LACT RESULT: SIGNIFICANT CHANGE UP
BLOOD GAS VENOUS COMPREHENSIVE RESULT: SIGNIFICANT CHANGE UP
BUN SERPL-MCNC: 19 MG/DL — SIGNIFICANT CHANGE UP (ref 7–23)
CA-I SERPL-SCNC: 1.17 MMOL/L — SIGNIFICANT CHANGE UP (ref 1.15–1.33)
CA-I SERPL-SCNC: 1.17 MMOL/L — SIGNIFICANT CHANGE UP (ref 1.15–1.33)
CA-I SERPL-SCNC: 1.2 MMOL/L — SIGNIFICANT CHANGE UP (ref 1.15–1.33)
CALCIUM SERPL-MCNC: 8.8 MG/DL — SIGNIFICANT CHANGE UP (ref 8.4–10.5)
CHLORIDE BLDV-SCNC: 100 MMOL/L — SIGNIFICANT CHANGE UP (ref 96–108)
CHLORIDE BLDV-SCNC: 102 MMOL/L — SIGNIFICANT CHANGE UP (ref 96–108)
CHLORIDE SERPL-SCNC: 103 MMOL/L — SIGNIFICANT CHANGE UP (ref 98–107)
CO2 BLDV-SCNC: 25.6 MMOL/L — SIGNIFICANT CHANGE UP (ref 22–26)
CO2 BLDV-SCNC: 25.6 MMOL/L — SIGNIFICANT CHANGE UP (ref 22–26)
CO2 BLDV-SCNC: 26.6 MMOL/L — HIGH (ref 22–26)
CO2 BLDV-SCNC: 26.8 MMOL/L — HIGH (ref 22–26)
CO2 SERPL-SCNC: 22 MMOL/L — SIGNIFICANT CHANGE UP (ref 22–31)
CREAT SERPL-MCNC: 0.78 MG/DL — SIGNIFICANT CHANGE UP (ref 0.5–1.3)
EGFR: 92 ML/MIN/1.73M2 — SIGNIFICANT CHANGE UP
GAS PNL BLDV: 129 MMOL/L — LOW (ref 136–145)
GAS PNL BLDV: 130 MMOL/L — LOW (ref 136–145)
GAS PNL BLDV: 132 MMOL/L — LOW (ref 136–145)
GAS PNL BLDV: 132 MMOL/L — LOW (ref 136–145)
GAS PNL BLDV: SIGNIFICANT CHANGE UP
GLUCOSE BLDV-MCNC: 100 MG/DL — HIGH (ref 70–99)
GLUCOSE BLDV-MCNC: 104 MG/DL — HIGH (ref 70–99)
GLUCOSE BLDV-MCNC: 106 MG/DL — HIGH (ref 70–99)
GLUCOSE BLDV-MCNC: 158 MG/DL — HIGH (ref 70–99)
GLUCOSE SERPL-MCNC: 115 MG/DL — HIGH (ref 70–99)
HCO3 BLDV-SCNC: 24 MMOL/L — SIGNIFICANT CHANGE UP (ref 22–29)
HCO3 BLDV-SCNC: 24 MMOL/L — SIGNIFICANT CHANGE UP (ref 22–29)
HCO3 BLDV-SCNC: 25 MMOL/L — SIGNIFICANT CHANGE UP (ref 22–29)
HCO3 BLDV-SCNC: 25 MMOL/L — SIGNIFICANT CHANGE UP (ref 22–29)
HCT VFR BLD CALC: 36 % — LOW (ref 39–50)
HCT VFR BLDA CALC: 35 % — LOW (ref 39–51)
HCT VFR BLDA CALC: 35 % — LOW (ref 39–51)
HCT VFR BLDA CALC: 36 % — LOW (ref 39–51)
HCT VFR BLDA CALC: 37 % — LOW (ref 39–51)
HGB BLD CALC-MCNC: 11.7 G/DL — LOW (ref 13–17)
HGB BLD CALC-MCNC: 11.8 G/DL — LOW (ref 13–17)
HGB BLD CALC-MCNC: 12.1 G/DL — LOW (ref 13–17)
HGB BLD CALC-MCNC: 12.2 G/DL — LOW (ref 13–17)
HGB BLD-MCNC: 11.8 G/DL — LOW (ref 13–17)
LACTATE BLDV-MCNC: 1.1 MMOL/L — SIGNIFICANT CHANGE UP (ref 0.5–2)
LACTATE BLDV-MCNC: 1.1 MMOL/L — SIGNIFICANT CHANGE UP (ref 0.5–2)
LACTATE BLDV-MCNC: 1.3 MMOL/L — SIGNIFICANT CHANGE UP (ref 0.5–2)
LACTATE BLDV-MCNC: 1.3 MMOL/L — SIGNIFICANT CHANGE UP (ref 0.5–2)
MAGNESIUM SERPL-MCNC: 2 MG/DL — SIGNIFICANT CHANGE UP (ref 1.6–2.6)
MCHC RBC-ENTMCNC: 30.3 PG — SIGNIFICANT CHANGE UP (ref 27–34)
MCHC RBC-ENTMCNC: 32.8 GM/DL — SIGNIFICANT CHANGE UP (ref 32–36)
MCV RBC AUTO: 92.5 FL — SIGNIFICANT CHANGE UP (ref 80–100)
NRBC # BLD: 0 /100 WBCS — SIGNIFICANT CHANGE UP (ref 0–0)
NRBC # FLD: 0 K/UL — SIGNIFICANT CHANGE UP (ref 0–0)
NT-PROBNP SERPL-SCNC: 3514 PG/ML — HIGH
PCO2 BLDV: 40 MMHG — LOW (ref 42–55)
PCO2 BLDV: 42 MMHG — SIGNIFICANT CHANGE UP (ref 42–55)
PCO2 BLDV: 42 MMHG — SIGNIFICANT CHANGE UP (ref 42–55)
PCO2 BLDV: 45 MMHG — SIGNIFICANT CHANGE UP (ref 42–55)
PH BLDV: 7.36 — SIGNIFICANT CHANGE UP (ref 7.32–7.43)
PH BLDV: 7.37 — SIGNIFICANT CHANGE UP (ref 7.32–7.43)
PH BLDV: 7.37 — SIGNIFICANT CHANGE UP (ref 7.32–7.43)
PH BLDV: 7.41 — SIGNIFICANT CHANGE UP (ref 7.32–7.43)
PHOSPHATE SERPL-MCNC: 3.1 MG/DL — SIGNIFICANT CHANGE UP (ref 2.5–4.5)
PLATELET # BLD AUTO: 238 K/UL — SIGNIFICANT CHANGE UP (ref 150–400)
PO2 BLDV: 32 MMHG — SIGNIFICANT CHANGE UP
PO2 BLDV: 35 MMHG — SIGNIFICANT CHANGE UP
PO2 BLDV: 37 MMHG — SIGNIFICANT CHANGE UP
PO2 BLDV: 42 MMHG — SIGNIFICANT CHANGE UP
POTASSIUM BLDV-SCNC: 4.3 MMOL/L — SIGNIFICANT CHANGE UP (ref 3.5–5.1)
POTASSIUM BLDV-SCNC: 4.5 MMOL/L — SIGNIFICANT CHANGE UP (ref 3.5–5.1)
POTASSIUM SERPL-MCNC: 4.4 MMOL/L — SIGNIFICANT CHANGE UP (ref 3.5–5.3)
POTASSIUM SERPL-SCNC: 4.4 MMOL/L — SIGNIFICANT CHANGE UP (ref 3.5–5.3)
RBC # BLD: 3.89 M/UL — LOW (ref 4.2–5.8)
RBC # FLD: 15.9 % — HIGH (ref 10.3–14.5)
SAO2 % BLDV: 50.2 % — SIGNIFICANT CHANGE UP
SAO2 % BLDV: 53.9 % — SIGNIFICANT CHANGE UP
SAO2 % BLDV: 56.9 % — SIGNIFICANT CHANGE UP
SAO2 % BLDV: 66.8 % — SIGNIFICANT CHANGE UP
SODIUM SERPL-SCNC: 135 MMOL/L — SIGNIFICANT CHANGE UP (ref 135–145)
WBC # BLD: 9.81 K/UL — SIGNIFICANT CHANGE UP (ref 3.8–10.5)
WBC # FLD AUTO: 9.81 K/UL — SIGNIFICANT CHANGE UP (ref 3.8–10.5)

## 2022-09-29 PROCEDURE — 71045 X-RAY EXAM CHEST 1 VIEW: CPT | Mod: 26,77

## 2022-09-29 PROCEDURE — 99233 SBSQ HOSP IP/OBS HIGH 50: CPT | Mod: FS

## 2022-09-29 PROCEDURE — 99291 CRITICAL CARE FIRST HOUR: CPT

## 2022-09-29 PROCEDURE — 71045 X-RAY EXAM CHEST 1 VIEW: CPT | Mod: 26

## 2022-09-29 RX ORDER — MIDODRINE HYDROCHLORIDE 2.5 MG/1
5 TABLET ORAL EVERY 8 HOURS
Refills: 0 | Status: DISCONTINUED | OUTPATIENT
Start: 2022-09-29 | End: 2022-09-30

## 2022-09-29 RX ORDER — ALBUMIN HUMAN 25 %
250 VIAL (ML) INTRAVENOUS ONCE
Refills: 0 | Status: COMPLETED | OUTPATIENT
Start: 2022-09-29 | End: 2022-09-29

## 2022-09-29 RX ORDER — MIDODRINE HYDROCHLORIDE 2.5 MG/1
5 TABLET ORAL EVERY 8 HOURS
Refills: 0 | Status: DISCONTINUED | OUTPATIENT
Start: 2022-09-29 | End: 2022-09-29

## 2022-09-29 RX ORDER — ROSUVASTATIN CALCIUM 5 MG/1
20 TABLET ORAL AT BEDTIME
Refills: 0 | Status: DISCONTINUED | OUTPATIENT
Start: 2022-09-29 | End: 2022-10-01

## 2022-09-29 RX ADMIN — DORNASE ALFA 2.5 MILLIGRAM(S): 1 SOLUTION RESPIRATORY (INHALATION) at 09:04

## 2022-09-29 RX ADMIN — CHLORHEXIDINE GLUCONATE 1 APPLICATION(S): 213 SOLUTION TOPICAL at 05:48

## 2022-09-29 RX ADMIN — LIDOCAINE 1 PATCH: 4 CREAM TOPICAL at 20:08

## 2022-09-29 RX ADMIN — SENNA PLUS 2 TABLET(S): 8.6 TABLET ORAL at 22:02

## 2022-09-29 RX ADMIN — LIDOCAINE 1 PATCH: 4 CREAM TOPICAL at 15:52

## 2022-09-29 RX ADMIN — PANTOPRAZOLE SODIUM 40 MILLIGRAM(S): 20 TABLET, DELAYED RELEASE ORAL at 14:51

## 2022-09-29 RX ADMIN — SACUBITRIL AND VALSARTAN 1 TABLET(S): 24; 26 TABLET, FILM COATED ORAL at 18:35

## 2022-09-29 RX ADMIN — ZOLPIDEM TARTRATE 5 MILLIGRAM(S): 10 TABLET ORAL at 23:19

## 2022-09-29 RX ADMIN — OXYCODONE HYDROCHLORIDE 5 MILLIGRAM(S): 5 TABLET ORAL at 05:00

## 2022-09-29 RX ADMIN — Medication 250 MILLILITER(S): at 14:51

## 2022-09-29 RX ADMIN — SODIUM CHLORIDE 4 MILLILITER(S): 9 INJECTION INTRAMUSCULAR; INTRAVENOUS; SUBCUTANEOUS at 09:05

## 2022-09-29 RX ADMIN — ZOLPIDEM TARTRATE 5 MILLIGRAM(S): 10 TABLET ORAL at 00:11

## 2022-09-29 RX ADMIN — TAMSULOSIN HYDROCHLORIDE 0.4 MILLIGRAM(S): 0.4 CAPSULE ORAL at 22:03

## 2022-09-29 RX ADMIN — POLYETHYLENE GLYCOL 3350 17 GRAM(S): 17 POWDER, FOR SOLUTION ORAL at 14:52

## 2022-09-29 RX ADMIN — MIRTAZAPINE 7.5 MILLIGRAM(S): 45 TABLET, ORALLY DISINTEGRATING ORAL at 22:03

## 2022-09-29 RX ADMIN — SODIUM CHLORIDE 4 MILLILITER(S): 9 INJECTION INTRAMUSCULAR; INTRAVENOUS; SUBCUTANEOUS at 22:30

## 2022-09-29 RX ADMIN — Medication 150 MILLIGRAM(S): at 14:52

## 2022-09-29 RX ADMIN — SACUBITRIL AND VALSARTAN 1 TABLET(S): 24; 26 TABLET, FILM COATED ORAL at 05:49

## 2022-09-29 RX ADMIN — HEPARIN SODIUM 5000 UNIT(S): 5000 INJECTION INTRAVENOUS; SUBCUTANEOUS at 05:48

## 2022-09-29 RX ADMIN — ROSUVASTATIN CALCIUM 20 MILLIGRAM(S): 5 TABLET ORAL at 23:18

## 2022-09-29 RX ADMIN — Medication 3 MILLILITER(S): at 08:57

## 2022-09-29 RX ADMIN — OXYCODONE HYDROCHLORIDE 5 MILLIGRAM(S): 5 TABLET ORAL at 04:13

## 2022-09-29 RX ADMIN — LACTULOSE 10 GRAM(S): 10 SOLUTION ORAL at 05:49

## 2022-09-29 RX ADMIN — Medication 81 MILLIGRAM(S): at 14:52

## 2022-09-29 RX ADMIN — Medication 3 MILLIGRAM(S): at 00:12

## 2022-09-29 RX ADMIN — HEPARIN SODIUM 5000 UNIT(S): 5000 INJECTION INTRAVENOUS; SUBCUTANEOUS at 22:02

## 2022-09-29 RX ADMIN — Medication 40 MILLIGRAM(S): at 05:49

## 2022-09-29 RX ADMIN — HEPARIN SODIUM 5000 UNIT(S): 5000 INJECTION INTRAVENOUS; SUBCUTANEOUS at 14:52

## 2022-09-29 RX ADMIN — Medication 3 MILLILITER(S): at 22:30

## 2022-09-29 RX ADMIN — DORNASE ALFA 2.5 MILLIGRAM(S): 1 SOLUTION RESPIRATORY (INHALATION) at 22:30

## 2022-09-29 RX ADMIN — MIDODRINE HYDROCHLORIDE 5 MILLIGRAM(S): 2.5 TABLET ORAL at 10:23

## 2022-09-29 NOTE — PROGRESS NOTE ADULT - ASSESSMENT
77 year old Male with PMH of bipolar disorder, CAD/STEMI  (5/2022) s/p R/LHC with IABP support which showed   mid %, LAD 70%, D1 70% and RA 15, PCWP 31, PA sat 59%, CO 3.63, CI 1.70, PVR 2.76; medical management recommended; TTE showed LV thrombus treated with Coumadin, HFrEF (EF 34%, LVIDd 6.1, moderate TR and severe PH), RUL mass s/p R. VATS (9/23/2022) c/b cardiogenic shock requiring inotropic support and diuretics. Pertinent labs:  AST 2768, ALT 1653 RHC:  RA 15, PCWP 31, PA sat 59%, CO 3.63, CI 1.70, PVR 2.76.     9/28 Dobutamine weaned off and hemodynamically stable   9/29 Lactate = 1.3 and VSat = 65.5%  BNP decreased to 3,514   AST/ALT decreased to 351/652    Lasix 40mg daily   Entresto 24/26mg twice daily    Midodrine 5mg Q8H ordered this AM; Please consider discontinuation     Strict I/O  Daily standing weights  Monitor lytes replete K>4.0 and Mg>2.0  Management per CTICU team  Pending final recommendations from HF attending   77 year old Male with PMH of bipolar disorder, CAD/STEMI  (5/2022) s/p R/LHC with IABP support which showed   mid %, LAD 70%, D1 70% and RA 15, PCWP 31, PA sat 59%, CO 3.63, CI 1.70, PVR 2.76; medical management recommended; TTE showed LV thrombus treated with Coumadin, HFrEF (EF 34%, LVIDd 6.1, moderate TR and severe PH), RUL mass s/p R. VATS (9/23/2022) c/b cardiogenic shock requiring inotropic support and diuretics. Pertinent labs:  AST 2768, ALT 1653 RHC:  RA 15, PCWP 31, PA sat 59%, CO 3.63, CI 1.70, PVR 2.76.     9/28 Dobutamine weaned off and hemodynamically stable   9/29 Lactate = 1.3 and VSat = 53.9%  BNP decreased to 3,514   AST/ALT decreased to 351/652 CO/CI 3.2/1.5    Lasix 40mg daily   Entresto 24/26mg twice daily (hold for SBP <90)     Midodrine 5mg Q8H ordered this AM; Please consider discontinuation     Strict I/O  Daily standing weights  Monitor lytes replete K>4.0 and Mg>2.0  Management per CTICU team  Pending final recommendations from HF attending   77 year old Male with PMH of bipolar disorder, CAD/STEMI  (5/2022) s/p R/LHC with IABP support which showed   mid %, LAD 70%, D1 70% and RA 15, PCWP 31, PA sat 59%, CO 3.63, CI 1.70, PVR 2.76; medical management recommended; TTE showed LV thrombus treated with Coumadin, HFrEF (EF 34%, LVIDd 6.1, moderate TR and severe PH), RUL mass s/p R. VATS (9/23/2022) c/b cardiogenic shock requiring inotropic support and diuretics. Pertinent labs:  AST 2768, ALT 1653 RHC:  RA 15, PCWP 31, PA sat 59%, CO 3.63, CI 1.70, PVR 2.76.     9/28 Dobutamine weaned off and hemodynamically stable   9/29 Lactate = 1.3 and VSat = 53.9%  CO/CI 3.2/1.5 (repeat pending)    BNP decreased to 3,514   AST/ALT decreased to 351/652   Lasix 40mg daily   Entresto 24/26mg twice daily (hold for SBP <90)     Midodrine 5mg Q8H ordered this AM; Please consider switching  to prn hold SBP>100      Strict I/O  Daily standing weights  Monitor lytes replete K>4.0 and Mg>2.0  Management per CTICU team (dc CT today and restart Plavix tomorrow and statin today)  Pending final recommendations from HF attending

## 2022-09-29 NOTE — PROGRESS NOTE ADULT - ASSESSMENT
ASSESSMENT:   77 yr old male presents with hx of bipolar, BPH, SCC of leg, CHF EF 25% severe segmental LV systolic dysfunction, CAD, hospitalized on May 2022 for acute MI s/p cardiac Cath with no intervention (revealed  RCA and 70% occlusion prox LAD, 1st diagonal 70% stenosis  Right VATs, right upper lobectomy 22  Hypotension sp IVF and sp pressors.  Hypotension from cardiogenic shock leading to BRODY - off pressors, and now off  gtt  Volume status is acceptable  - 2.8L UOP in 24 hours     1 Renal- BRODY was from cariogenic shock and he is now off  gtt, s/p lasix 10 mg IV () -resolved   2 CVS-Severe global reduction in EF and on inotropes   3 Pulm- CXR improving, no effusions     RECOMMENDATIONS:   - off  gtt, on midodrine 5 mg po tid  - Maintain negative outpt at present and can use loop diuretics to achieve this goal - on lasix 40 mg po daily   - C/w fluid restriction 1.2L per day   - started on Enresto po bid  - Eventual Toprol     D/w Dr. Arlene Calderon, NP-C  [facility]  [phone]    ASSESSMENT:   77 yr old male presents with hx of bipolar, BPH, SCC of leg, CHF EF 25% severe segmental LV systolic dysfunction, CAD, hospitalized on May 2022 for acute MI s/p cardiac Cath with no intervention (revealed  RCA and 70% occlusion prox LAD, 1st diagonal 70% stenosis  Right VATs, right upper lobectomy 22  Hypotension sp IVF and sp pressors.  Hypotension from cardiogenic shock leading to BRODY - off pressors, and now off  gtt  Volume status is acceptable  - 2.8L UOP in 24 hours     1 Renal- BRODY was from cariogenic shock and he is now off  gtt, s/p lasix 10 mg IV () -resolved   2 CVS-Severe global reduction in EF    3 Pulm- CXR improving, no effusions     RECOMMENDATIONS:   - off  gtt.  Long term would like the midodrine 5 mg po tid off  - Maintain negative outpt at present and can use loop diuretics to achieve this goal - on lasix 40 mg po daily   - C/w fluid restriction 1.2L per day   - started on Enresto po bid  - Eventual Toprol     D/w Dr. Arlene Calderon, NP-C  [facility]  [phone]

## 2022-09-29 NOTE — PROGRESS NOTE ADULT - NS ATTEND AMEND GEN_ALL_CORE FT
Wean midodrine to 5 mg po bid,  Continue to monitor CV sat bid. Likely can d/c central line tomorrow.  No BB for now.

## 2022-09-29 NOTE — PROGRESS NOTE ADULT - SUBJECTIVE AND OBJECTIVE BOX
ELYSE MALAVE                     [id number]    HPI:  77 yr old male presents with hx of bipolar, BPH, SCC of leg, CHF EF 25% severe segmental LV systolic dysfunction, CAD, hospitalized on May 2022 for acute MI s/p cardiac Cath with no intervention (revealed  RCA and 70% occlusion prox LAD, 1st diagonal 70% stenosis- medical management-no intervention, IABP used for elevated right sided pressures; cardiogenic shock, found to have new 1x 1.3cm LV thrombus on Plavix and Coumadin (Coumadin completed on 08/26/2022, Plavix stopped around same time per pt), c/o one episode of difficulty walking, chest discomfort  and SOB while in the pool, CXR showed RUL nodule, PET/CT showed FDG avid RUL mass    Procedure:  FB, R VATS, R Upper Lobectomy. 23-Sep-2022                       Issues:              Cardiogenic shock  CAD,   Biventricular failure, h/o LV thrombus, resolved   Lung nodule  Postop pain  Chest tube in place  BPH  Bipolar disorder / Depression / Anxiety  Depression    PAST MEDICAL & SURGICAL HISTORY:  BPH (benign prostatic hyperplasia)      Bipolar disorder      Depression      Anxiety      Umbilical hernia, incarcerated      Inguinal hernia of right side without obstruction or gangrene      Depression      Constipation      Urinary retention      CAD (coronary artery disease)      SCC (squamous cell carcinoma)      Lung mass      Other nonspecific abnormal finding of lung field      LV (left ventricular) mural thrombus      History of inguinal hernia      Severe left ventricular systolic dysfunction      History of CHF (congestive heart failure)      History of arthroscopy of knee  Right, 1987      History of tonsillectomy  1952      History of hernia repair      H/O coronary angiogram                VITAL SIGNS:  Vital Signs Last 24 Hrs  T(C): 35.6 (29 Sep 2022 08:00), Max: 36.9 (28 Sep 2022 12:00)  T(F): 96 (29 Sep 2022 08:00), Max: 98.5 (28 Sep 2022 12:00)  HR: 93 (29 Sep 2022 10:00) (73 - 103)  BP: 90/57 (29 Sep 2022 08:19) (90/57 - 90/57)  BP(mean): 66 (29 Sep 2022 08:19) (66 - 66)  RR: 22 (29 Sep 2022 10:00) (12 - 22)  SpO2: 100% (29 Sep 2022 10:00) (97% - 100%)    Parameters below as of 29 Sep 2022 10:00  Patient On (Oxygen Delivery Method): room air        I/Os:   I&O's Detail    28 Sep 2022 07:01  -  29 Sep 2022 07:00  --------------------------------------------------------  IN:    DOBUTamine: 2.4 mL    DOBUTamine: 14.4 mL    Oral Fluid: 510 mL  Total IN: 526.8 mL    OUT:    Chest Tube (mL): 215 mL    Indwelling Catheter - Urethral (mL): 2785 mL  Total OUT: 3000 mL    Total NET: -2473.2 mL      29 Sep 2022 07:01  -  29 Sep 2022 10:59  --------------------------------------------------------  IN:    Oral Fluid: 240 mL  Total IN: 240 mL    OUT:    Chest Tube (mL): 0 mL    Indwelling Catheter - Urethral (mL): 275 mL  Total OUT: 275 mL    Total NET: -35 mL          CAPILLARY BLOOD GLUCOSE          =======================MEDICATIONS===================  MEDICATIONS  (STANDING):  albuterol/ipratropium for Nebulization 3 milliLiter(s) Nebulizer every 6 hours  aspirin enteric coated 81 milliGRAM(s) Oral daily  chlorhexidine 4% Liquid 1 Application(s) Topical <User Schedule>  dornase delilah Solution 2.5 milliGRAM(s) Inhalation two times a day  furosemide    Tablet 40 milliGRAM(s) Oral daily  heparin   Injectable 5000 Unit(s) SubCutaneous every 8 hours  lactulose Syrup 10 Gram(s) Oral every 8 hours  lidocaine   4% Patch 1 Patch Transdermal daily  midodrine 5 milliGRAM(s) Oral every 8 hours  mirtazapine 7.5 milliGRAM(s) Oral at bedtime  pantoprazole  Injectable 40 milliGRAM(s) IV Push daily  polyethylene glycol 3350 17 Gram(s) Oral daily  sacubitril 24 mG/valsartan 26 mG 1 Tablet(s) Oral two times a day  senna 2 Tablet(s) Oral at bedtime  sodium chloride 3%  Inhalation 4 milliLiter(s) Inhalation every 12 hours  tamsulosin 0.4 milliGRAM(s) Oral at bedtime  venlafaxine XR. 150 milliGRAM(s) Oral daily    MEDICATIONS  (PRN):  HYDROmorphone  Injectable 0.25 milliGRAM(s) IV Push every 6 hours PRN Severe Pain (7 - 10)  melatonin 3 milliGRAM(s) Oral at bedtime PRN Insomnia  oxyCODONE    IR 5 milliGRAM(s) Oral every 6 hours PRN Moderate Pain (4 - 6)  polyethylene glycol 3350 17 Gram(s) Oral daily PRN Constipation  zolpidem 5 milliGRAM(s) Oral at bedtime PRN Insomnia  zolpidem 5 milliGRAM(s) Oral at bedtime PRN Insomnia      PHYSICAL EXAM============================  General:                         Awake, alert, not in any distress  Neuro:                            Moving all extremities to commands.   Respiratory:	Air entry fair and  bilateral conducted sounds                                           Effort even and unlabored.  CV:		Regular rate and rhythm. Normal S1/S2                                          Distal pulses present.  Abdomen:	                     Soft, non-distended. Bowel sounds present   Skin:		No rash.  Extremities:	Warm, no cyanosis or edema.  Palpable pulses    ============================LABS=========================                        11.8   9.81  )-----------( 238      ( 29 Sep 2022 04:33 )             36.0     09-29    135  |  103  |  19  ----------------------------<  115<H>  4.4   |  22  |  0.78    Ca    8.8      29 Sep 2022 04:33  Phos  3.1     09-29  Mg     2.00     09-29          ABG - ( 29 Sep 2022 04:33 )  pH, Arterial: 7.46  pH, Blood: x     /  pCO2: 33    /  pO2: 77    / HCO3: 24    / Base Excess: 0.2   /  SaO2: 95.7        YOB: 1945   Age: 77 (M)   MR#: [id number]  Study Date: 9/24/2022  [facility]  Study quality: Technically good  Referring Physician: Stevie Padilla MD  Blood Pressure: 144/65 mmHg  Height: 181 cm  Weight: 80 kg  BSA: 2 m2  ------------------------------------------------------------------------  PROCEDURE: Transthoracic echocardiogram with 2-D, M-Mode  and complete spectral and color flow Doppler.  INDICATION: ST elevation (STEMI) myocardial infarction of  unspecified site (I21.3)  ------------------------------------------------------------------------  DIMENSIONS:  Dimensions:     Normal Values:  LA:     4.1 cm    2.0 - 4.0 cm  Ao:     3.7 cm    2.0 - 3.8 cm  SEPTUM: 1.2 cm    0.6 - 1.2 cm  PWT:    1.2 cm    0.6 - 1.1 cm  LVIDd:  6.1 cm    3.0 - 5.6 cm  LVIDs:    ---     1.8 - 4.0 cm  Derived Variables:  LVMI: 161 g/m2  RWT: 0.39  Ejection Fraction (Modified Pizarro Rule): 34 %  ------------------------------------------------------------------------  OBSERVATIONS:  Mitral Valve: Mitral annular calcification, otherwise  normal mitral valve. Moderate mitral regurgitation.  Aortic Root: Normal aortic root.  Aortic Valve: Calcified trileaflet aortic valve with normal  opening. Mild aortic regurgitation.  Left Atrium: Normal left atrium.  LA volume index = 28  cc/m2.  Left Ventricle: Severe global left ventricular systolic  dysfunction. Severe left ventricular enlargement. Moderate  diastolic dysfunction (Stage II).  Right Heart: Normal right atrium. Right ventricular  enlargement with normal right ventricular systolic  function. Normal tricuspid valve.    Moderate tricuspid  regurgitation. Normal pulmonic valve. Mild pulmonic  regurgitation.  Pericardium/PleuraNormal pericardium with no pericardial  effusion.  Hemodynamic: Estimated right ventricular systolic pressure  equals 82 mm Hg, assuming right atrial pressure equals 10  mm Hg, consistent with severe pulmonary hypertension.  ------------------------------------------------------------------------  CONCLUSIONS:  1. Mitral annular calcification, otherwise normal mitral  valve. Moderate mitral regurgitation.  2. Severe left ventricular enlargement.  3. Severe global left ventricularsystolic dysfunction.  4. Moderate diastolic dysfunction (Stage II).  5. Right ventricular enlargement with normal right  ventricular systolic function.  6. Estimated pulmonary artery systolic pressure equals 82  mm Hg, assuming right atrial pressureequals 10  mm Hg,  consistent with severe pulmonary hypertension.        A/P:  77yMale s/p FB, R VATS, R Upper Lobectomy. 23-Sep-2022, experiencing  pain with deep breathing.                             Neuro:                                         Pain control with  Dilaudid /  Tylenol / Oxy  PRN    Bipolar disorder / Anxiety / Depression: Continue mirtazapine                             Cardiovascular:      Cardiogenic shock: On Dobutamine support, SCVO2 56-67%, Monitor SCVO2, CVP  Clinically pt is not showing signs of decompensation  2D Echo on 9/24:  Severe global left ventricular systolic dysfunction, Moderate diastolic dysfunction (Stage II), Right ventricular enlargement with normal right ventricular systolic function    Dobutamine weaned off, and check BMP / Lactate , monitor CVP, SCVO2  Continue gentle diuresis and monitor renal function  Cardiology f/u. CHF team consulted                                           CAD: Continue ASA 81mg. Hold BB  HLD: Lipitor on hold because of shock liver. Trend LFTs.  Telemetry (medical test) - Reviewed by me today independently. Normal sinus rhythm /  with some PVCs .                                          Continue hemodynamic monitoring to prevent decompensation.                            Respiratory:                                         Postop hypoxemia requiring O2 via nasal cannula probably due to postop pain - Wean nasal cannula for goal O2sat above 92%.                                              Obtain CXR . Encourage incentive spirometry.                                                   Chest PT and frequent suctioning. Continue bronchodilators, Pulmozyme and inhaled 3% saline inhalations.                                                      OOB to chair & ambulate w/ assistance.                                                           Continuous pulse oximetry for support & to prevent decompensation.                                         Monitor chest tube output                                         Chest tube to  water seal                                                                                           GI                                         On DASH  diet as tolerated                                         Continue Zofran / Reglan for nausea - PRN                                         Continue bowel regimen     Shock liver: LFTs are trending down	                                                                 Renal:                                                                                  Monitor I/Os and electrolytes     Continue gentle diuresis with Lasix, add aldactone for CHF     BPH: Continue Flomax                                                                                        Hem/ Onc:                                         DVT prophylaxis with SQ Heparin and SCDs                                         Monitor chest tube output &  signs of bleeding.                                          Follow CBC in AM                           Infectious disease:                                            Monitor for fever / leukocytosis.                                          All surgical incision / chest tube  sites look clean                            Endocrine                                             Continue Accu-Checks with coverage                                                 Pertinent clinical, laboratory, radiographic, hemodynamic, echocardiographic, respiratory data, microbiologic data and chart were reviewed and analyzed frequently throughout the course of the day and night.     Patient seen, examined and plan discussed with CT Surgeon Dr. Padilla / CTICU team during rounds.     Status discussed with patient and updated plan of care.     I have spent  30 minutes of critical care time with this pt between  7am and 11.59pm monitoring hemodynamic status, managing inotrope to prevent  worsening of shock.         Kerrie AGOSTOP

## 2022-09-29 NOTE — PROGRESS NOTE ADULT - SUBJECTIVE AND OBJECTIVE BOX
NEPHROLOGY     Patient seen and examined.    MEDICATIONS  (STANDING):  albuterol/ipratropium for Nebulization 3 milliLiter(s) Nebulizer every 6 hours  aspirin enteric coated 81 milliGRAM(s) Oral daily  chlorhexidine 4% Liquid 1 Application(s) Topical <User Schedule>  dornase delilah Solution 2.5 milliGRAM(s) Inhalation two times a day  furosemide    Tablet 40 milliGRAM(s) Oral daily  heparin   Injectable 5000 Unit(s) SubCutaneous every 8 hours  lactulose Syrup 10 Gram(s) Oral every 8 hours  lidocaine   4% Patch 1 Patch Transdermal daily  midodrine 5 milliGRAM(s) Oral every 8 hours  mirtazapine 7.5 milliGRAM(s) Oral at bedtime  pantoprazole  Injectable 40 milliGRAM(s) IV Push daily  polyethylene glycol 3350 17 Gram(s) Oral daily  sacubitril 24 mG/valsartan 26 mG 1 Tablet(s) Oral two times a day  senna 2 Tablet(s) Oral at bedtime  sodium chloride 3%  Inhalation 4 milliLiter(s) Inhalation every 12 hours  tamsulosin 0.4 milliGRAM(s) Oral at bedtime  venlafaxine XR. 150 milliGRAM(s) Oral daily    VITALS:  T(C): , Max: 36.9 (09-28-22 @ 12:00)  T(F): , Max: 98.5 (09-28-22 @ 12:00)  HR: 74 (09-29-22 @ 09:03)  BP: 90/57 (09-29-22 @ 08:19)  BP(mean): 66 (09-29-22 @ 08:19)  RR: 13 (09-29-22 @ 09:00)  SpO2: 100% (09-29-22 @ 09:03)    I and O's:    09-28 @ 07:01  -  09-29 @ 07:00  --------------------------------------------------------  IN: 526.8 mL / OUT: 3000 mL / NET: -2473.2 mL    09-29 @ 07:01  -  09-29 @ 10:19  --------------------------------------------------------  IN: 0 mL / OUT: 125 mL / NET: -125 mL    PHYSICAL EXAM:  Constitutional: NAD  Chest: + R CT   Neck:  No JVD  Respiratory: CTAB/L  Cardiovascular: S1 and S2  Gastrointestinal: BS+, soft, NT/ND  Extremities: No peripheral edema  Neurological: A/O x 3, no focal deficits.  Psychiatric: Normal mood, normal affect  : + Chandler  Skin: No rashes    LABS:                        11.8   9.81  )-----------( 238      ( 29 Sep 2022 04:33 )             36.0     09-29    135  |  103  |  19  ----------------------------<  115<H>  4.4   |  22  |  0.78    Ca    8.8      29 Sep 2022 04:33  Phos  3.1     09-29  Mg     2.00     09-29   NEPHROLOGY     Patient seen and examined sitting in chair, no new complaints, states he had bm this morning, he denies pain, no sob, reports ambulating around hallway earlier, in no acute distress.     MEDICATIONS  (STANDING):  albuterol/ipratropium for Nebulization 3 milliLiter(s) Nebulizer every 6 hours  aspirin enteric coated 81 milliGRAM(s) Oral daily  chlorhexidine 4% Liquid 1 Application(s) Topical <User Schedule>  dornase delilah Solution 2.5 milliGRAM(s) Inhalation two times a day  furosemide    Tablet 40 milliGRAM(s) Oral daily  heparin   Injectable 5000 Unit(s) SubCutaneous every 8 hours  lactulose Syrup 10 Gram(s) Oral every 8 hours  lidocaine   4% Patch 1 Patch Transdermal daily  midodrine 5 milliGRAM(s) Oral every 8 hours  mirtazapine 7.5 milliGRAM(s) Oral at bedtime  pantoprazole  Injectable 40 milliGRAM(s) IV Push daily  polyethylene glycol 3350 17 Gram(s) Oral daily  sacubitril 24 mG/valsartan 26 mG 1 Tablet(s) Oral two times a day  senna 2 Tablet(s) Oral at bedtime  sodium chloride 3%  Inhalation 4 milliLiter(s) Inhalation every 12 hours  tamsulosin 0.4 milliGRAM(s) Oral at bedtime  venlafaxine XR. 150 milliGRAM(s) Oral daily    VITALS:  T(C): , Max: 36.9 (09-28-22 @ 12:00)  T(F): , Max: 98.5 (09-28-22 @ 12:00)  HR: 74 (09-29-22 @ 09:03)  BP: 90/57 (09-29-22 @ 08:19)  BP(mean): 66 (09-29-22 @ 08:19)  RR: 13 (09-29-22 @ 09:00)  SpO2: 100% (09-29-22 @ 09:03)    I and O's:    09-28 @ 07:01  -  09-29 @ 07:00  --------------------------------------------------------  IN: 526.8 mL / OUT: 3000 mL / NET: -2473.2 mL    09-29 @ 07:01  -  09-29 @ 10:19  --------------------------------------------------------  IN: 0 mL / OUT: 125 mL / NET: -125 mL    PHYSICAL EXAM:  Constitutional: NAD  Chest: + R CT   Neck:  No JVD  Respiratory: CTAB/L  Cardiovascular: S1 and S2  Gastrointestinal: BS+, soft, NT/ND  Extremities: No peripheral edema  Neurological: A/O x 3, no focal deficits.  Psychiatric: Normal mood, normal affect  : + Chandler  Skin: No rashes    LABS:                        11.8   9.81  )-----------( 238      ( 29 Sep 2022 04:33 )             36.0     09-29    135  |  103  |  19  ----------------------------<  115<H>  4.4   |  22  |  0.78    Ca    8.8      29 Sep 2022 04:33  Phos  3.1     09-29  Mg     2.00     09-29   NEPHROLOGY     Patient seen and examined sitting in chair, no new complaints, states he had bm this morning, he denies pain, no sob, reports ambulating around hallway earlier, in no acute distress.     MEDICATIONS  (STANDING):  albuterol/ipratropium for Nebulization 3 milliLiter(s) Nebulizer every 6 hours.  aspirin enteric coated 81 milliGRAM(s) Oral daily  chlorhexidine 4% Liquid 1 Application(s) Topical <User Schedule>  dornase delilah Solution 2.5 milliGRAM(s) Inhalation two times a day  furosemide    Tablet 40 milliGRAM(s) Oral daily  heparin   Injectable 5000 Unit(s) SubCutaneous every 8 hours  lactulose Syrup 10 Gram(s) Oral every 8 hours  lidocaine   4% Patch 1 Patch Transdermal daily  midodrine 5 milliGRAM(s) Oral every 8 hours  mirtazapine 7.5 milliGRAM(s) Oral at bedtime  pantoprazole  Injectable 40 milliGRAM(s) IV Push daily  polyethylene glycol 3350 17 Gram(s) Oral daily  sacubitril 24 mG/valsartan 26 mG 1 Tablet(s) Oral two times a day  senna 2 Tablet(s) Oral at bedtime  sodium chloride 3%  Inhalation 4 milliLiter(s) Inhalation every 12 hours  tamsulosin 0.4 milliGRAM(s) Oral at bedtime  venlafaxine XR. 150 milliGRAM(s) Oral daily    VITALS:  T(C): , Max: 36.9 (09-28-22 @ 12:00)  T(F): , Max: 98.5 (09-28-22 @ 12:00)  HR: 74 (09-29-22 @ 09:03)  BP: 90/57 (09-29-22 @ 08:19)  BP(mean): 66 (09-29-22 @ 08:19)  RR: 13 (09-29-22 @ 09:00)  SpO2: 100% (09-29-22 @ 09:03)    I and O's:    09-28 @ 07:01  -  09-29 @ 07:00  --------------------------------------------------------  IN: 526.8 mL / OUT: 3000 mL / NET: -2473.2 mL    09-29 @ 07:01  -  09-29 @ 10:19  --------------------------------------------------------  IN: 0 mL / OUT: 125 mL / NET: -125 mL    PHYSICAL EXAM:  Constitutional: NAD  Chest: + R CT   Neck:  No JVD  Respiratory: CTAB/L  Cardiovascular: S1 and S2.  Gastrointestinal: BS+, soft, NT/ND  Extremities: No peripheral edema  Neurological: A/O x 3, no focal deficits.  Psychiatric: Normal mood, normal affect  : + Chandler  Skin: No rashes    LABS:                        11.8   9.81  )-----------( 238      ( 29 Sep 2022 04:33 )             36.0     09-29    135  |  103  |  19  ----------------------------<  115<H>  4.4   |  22  |  0.78    Ca    8.8      29 Sep 2022 04:33  Phos  3.1     09-29  Mg     2.00     09-29

## 2022-09-29 NOTE — PROGRESS NOTE ADULT - SUBJECTIVE AND OBJECTIVE BOX
Interval History:  Patient resting comfortably in bed   Denies CP/SOB/palpitations/dizziness  No acute events overnight      Medications:  albuterol/ipratropium for Nebulization 3 milliLiter(s) Nebulizer every 6 hours  aspirin enteric coated 81 milliGRAM(s) Oral daily  chlorhexidine 4% Liquid 1 Application(s) Topical <User Schedule>  dornase delilah Solution 2.5 milliGRAM(s) Inhalation two times a day  furosemide    Tablet 40 milliGRAM(s) Oral daily  heparin   Injectable 5000 Unit(s) SubCutaneous every 8 hours  HYDROmorphone  Injectable 0.25 milliGRAM(s) IV Push every 6 hours PRN  lactulose Syrup 10 Gram(s) Oral every 8 hours  lidocaine   4% Patch 1 Patch Transdermal daily  melatonin 3 milliGRAM(s) Oral at bedtime PRN  midodrine 5 milliGRAM(s) Oral every 8 hours  mirtazapine 7.5 milliGRAM(s) Oral at bedtime  oxyCODONE    IR 5 milliGRAM(s) Oral every 6 hours PRN  pantoprazole  Injectable 40 milliGRAM(s) IV Push daily  polyethylene glycol 3350 17 Gram(s) Oral daily  polyethylene glycol 3350 17 Gram(s) Oral daily PRN  sacubitril 24 mG/valsartan 26 mG 1 Tablet(s) Oral two times a day  senna 2 Tablet(s) Oral at bedtime  sodium chloride 3%  Inhalation 4 milliLiter(s) Inhalation every 12 hours  tamsulosin 0.4 milliGRAM(s) Oral at bedtime  venlafaxine XR. 150 milliGRAM(s) Oral daily  zolpidem 5 milliGRAM(s) Oral at bedtime PRN  zolpidem 5 milliGRAM(s) Oral at bedtime PRN      Vitals:  T(C): 35.6 (22 @ 12:00), Max: 36.4 (22 @ 16:00)  HR: 92 (22 @ 12:00) (73 - 103)  BP: 90/57 (22 @ 08:19) (90/57 - 90/57)  BP(mean): 66 (22 @ 08:19) (66 - 66)  RR: 15 (22 @ 12:00) (12 - 22)  SpO2: 100% (22 @ 12:00) (97% - 100%)    Daily     Daily Weight in k (29 Sep 2022 06:00)        I&O's Summary    28 Sep 2022 07:01  -  29 Sep 2022 07:00  --------------------------------------------------------  IN: 526.8 mL / OUT: 3000 mL / NET: -2473.2 mL    29 Sep 2022 07:01  -  29 Sep 2022 12:06  --------------------------------------------------------  IN: 240 mL / OUT: 455 mL / NET: -215 mL        Physical Exam:  Appearance: No Acute Distress  Neck: No JVD  Cardiovascular: Normal S1 S2  Respiratory: Clear to auscultation bilaterally  Gastrointestinal: Soft, Non-tender	  Skin: No cyanosis	  Neurologic: Non-focal  Extremities: No LE edema  Psychiatry: A & O x 3, Mood & affect appropriate    Labs:                        11.8   9.81  )-----------( 238      ( 29 Sep 2022 04:33 )             36.0         135  |  103  |  19  ----------------------------<  115<H>  4.4   |  22  |  0.78    Ca    8.8      29 Sep 2022 04:33  Phos  3.1       Mg     2.00         Serum Pro-Brain Natriuretic Peptide: 3514: (22 @ 04:33)    Blood Gas Venous - Lactate: 1.3 mmol/L (22 @ 11:45)    Aspartate Aminotransferase (AST/SGOT): 351 U/L (22 @ 04:20)    Alanine Aminotransferase (ALT/SGPT): 652 U/L (22 @ 04:20)        TELEMETRY: Sinus Rhythm     Echocardiogram:  Transthoracic Echocardiogram (22 @ 14:33)   ------------------------------------------------------------------------  DIMENSIONS:  Dimensions:     Normal Values:  LA:     4.1 cm    2.0 - 4.0 cm  Ao:     3.7 cm    2.0 - 3.8 cm  SEPTUM: 1.2 cm    0.6 - 1.2 cm  PWT:    1.2 cm    0.6 - 1.1 cm  LVIDd:  6.1 cm    3.0 - 5.6 cm  LVIDs:    ---     1.8 - 4.0 cm  Derived Variables:  LVMI: 161 g/m2  RWT: 0.39  Ejection Fraction (Modified Pizarro Rule): 34 %  ------------------------------------------------------------------------  OBSERVATIONS:  Mitral Valve: Mitral annular calcification, otherwise  normal mitral valve. Moderate mitral regurgitation.  Aortic Root: Normal aortic root.  Aortic Valve: Calcified trileaflet aortic valve with normal  opening. Mild aortic regurgitation.  Left Atrium: Normal left atrium.  LA volume index = 28  cc/m2.  Left Ventricle: Severe global left ventricular systolic  dysfunction. Severe left ventricular enlargement. Moderate  diastolic dysfunction (Stage II).  Right Heart: Normal right atrium. Right ventricular  enlargement with normal right ventricular systolic  function. Normal tricuspid valve.    Moderate tricuspid  regurgitation. Normal pulmonic valve. Mild pulmonic  regurgitation.  Pericardium/PleuraNormal pericardium with no pericardial  effusion.  Hemodynamic: Estimated right ventricular systolic pressure  equals 82 mm Hg, assuming right atrial pressure equals 10  mm Hg, consistent with severe pulmonary hypertension.  ------------------------------------------------------------------------  CONCLUSIONS:  1. Mitral annular calcification, otherwise normal mitral  valve. Moderate mitral regurgitation.  2. Severe left ventricular enlargement.  3. Severe global left ventricularsystolic dysfunction.  4. Moderate diastolic dysfunction (Stage II).  5. Right ventricular enlargement with normal right  ventricular systolic function.  6. Estimated pulmonary artery systolic pressure equals 82  mm Hg, assuming right atrial pressureequals 10  mm Hg,  consistent with severe pulmonary hypertension.         Interval History:  Patient resting comfortably in chair   Denies CP/SOB/palpitations/dizziness  No acute events overnight      Medications:  albuterol/ipratropium for Nebulization 3 milliLiter(s) Nebulizer every 6 hours  aspirin enteric coated 81 milliGRAM(s) Oral daily  chlorhexidine 4% Liquid 1 Application(s) Topical <User Schedule>  dornase delilah Solution 2.5 milliGRAM(s) Inhalation two times a day  furosemide    Tablet 40 milliGRAM(s) Oral daily  heparin   Injectable 5000 Unit(s) SubCutaneous every 8 hours  HYDROmorphone  Injectable 0.25 milliGRAM(s) IV Push every 6 hours PRN  lactulose Syrup 10 Gram(s) Oral every 8 hours  lidocaine   4% Patch 1 Patch Transdermal daily  melatonin 3 milliGRAM(s) Oral at bedtime PRN  midodrine 5 milliGRAM(s) Oral every 8 hours  mirtazapine 7.5 milliGRAM(s) Oral at bedtime  oxyCODONE    IR 5 milliGRAM(s) Oral every 6 hours PRN  pantoprazole  Injectable 40 milliGRAM(s) IV Push daily  polyethylene glycol 3350 17 Gram(s) Oral daily  polyethylene glycol 3350 17 Gram(s) Oral daily PRN  sacubitril 24 mG/valsartan 26 mG 1 Tablet(s) Oral two times a day  senna 2 Tablet(s) Oral at bedtime  sodium chloride 3%  Inhalation 4 milliLiter(s) Inhalation every 12 hours  tamsulosin 0.4 milliGRAM(s) Oral at bedtime  venlafaxine XR. 150 milliGRAM(s) Oral daily  zolpidem 5 milliGRAM(s) Oral at bedtime PRN  zolpidem 5 milliGRAM(s) Oral at bedtime PRN      Vitals:  T(C): 35.6 (22 @ 12:00), Max: 36.4 (22 @ 16:00)  HR: 92 (22 @ 12:00) (73 - 103)  BP: 90/57 (22 @ 08:19) (90/57 - 90/57)  BP(mean): 66 (22 @ 08:19) (66 - 66)  RR: 15 (22 @ 12:00) (12 - 22)  SpO2: 100% (22 @ 12:00) (97% - 100%)    Daily     Daily Weight in k (29 Sep 2022 06:00)        I&O's Summary    28 Sep 2022 07:01  -  29 Sep 2022 07:00  --------------------------------------------------------  IN: 526.8 mL / OUT: 3000 mL / NET: -2473.2 mL    29 Sep 2022 07:01  -  29 Sep 2022 12:06  --------------------------------------------------------  IN: 240 mL / OUT: 455 mL / NET: -215 mL        Physical Exam:  Appearance: No Acute Distress  Neck: No JVD  Cardiovascular: Normal S1 S2  Respiratory: Clear to auscultation bilaterally  Gastrointestinal: Soft, Non-tender	  Skin: No cyanosis	  Neurologic: Non-focal  Extremities: No LE edema  Psychiatry: A & O x 3, Mood & affect appropriate    Labs:                        11.8   9.81  )-----------( 238      ( 29 Sep 2022 04:33 )             36.0         135  |  103  |  19  ----------------------------<  115<H>  4.4   |  22  |  0.78    Ca    8.8      29 Sep 2022 04:33  Phos  3.1       Mg     2.00         Serum Pro-Brain Natriuretic Peptide: 3514: (22 @ 04:33)    Blood Gas Venous - Lactate: 1.3 mmol/L (22 @ 11:45)    Aspartate Aminotransferase (AST/SGOT): 351 U/L (22 @ 04:20)    Alanine Aminotransferase (ALT/SGPT): 652 U/L (22 @ 04:20)        TELEMETRY: Sinus Rhythm     Echocardiogram:  Transthoracic Echocardiogram (22 @ 14:33)   ------------------------------------------------------------------------  DIMENSIONS:  Dimensions:     Normal Values:  LA:     4.1 cm    2.0 - 4.0 cm  Ao:     3.7 cm    2.0 - 3.8 cm  SEPTUM: 1.2 cm    0.6 - 1.2 cm  PWT:    1.2 cm    0.6 - 1.1 cm  LVIDd:  6.1 cm    3.0 - 5.6 cm  LVIDs:    ---     1.8 - 4.0 cm  Derived Variables:  LVMI: 161 g/m2  RWT: 0.39  Ejection Fraction (Modified Pizarro Rule): 34 %  ------------------------------------------------------------------------  OBSERVATIONS:  Mitral Valve: Mitral annular calcification, otherwise  normal mitral valve. Moderate mitral regurgitation.  Aortic Root: Normal aortic root.  Aortic Valve: Calcified trileaflet aortic valve with normal  opening. Mild aortic regurgitation.  Left Atrium: Normal left atrium.  LA volume index = 28  cc/m2.  Left Ventricle: Severe global left ventricular systolic  dysfunction. Severe left ventricular enlargement. Moderate  diastolic dysfunction (Stage II).  Right Heart: Normal right atrium. Right ventricular  enlargement with normal right ventricular systolic  function. Normal tricuspid valve.    Moderate tricuspid  regurgitation. Normal pulmonic valve. Mild pulmonic  regurgitation.  Pericardium/PleuraNormal pericardium with no pericardial  effusion.  Hemodynamic: Estimated right ventricular systolic pressure  equals 82 mm Hg, assuming right atrial pressure equals 10  mm Hg, consistent with severe pulmonary hypertension.  ------------------------------------------------------------------------  CONCLUSIONS:  1. Mitral annular calcification, otherwise normal mitral  valve. Moderate mitral regurgitation.  2. Severe left ventricular enlargement.  3. Severe global left ventricularsystolic dysfunction.  4. Moderate diastolic dysfunction (Stage II).  5. Right ventricular enlargement with normal right  ventricular systolic function.  6. Estimated pulmonary artery systolic pressure equals 82  mm Hg, assuming right atrial pressureequals 10  mm Hg,  consistent with severe pulmonary hypertension.

## 2022-09-29 NOTE — PROGRESS NOTE ADULT - NS ATTEND AMEND GEN_ALL_CORE FT
Renal attd    -Monitor for hypotension on Entresto...  -On Midodrine  -Renal function largely improved     Sayed [facility]   [phone]

## 2022-09-30 LAB
ANION GAP SERPL CALC-SCNC: 7 MMOL/L — SIGNIFICANT CHANGE UP (ref 7–14)
BASE EXCESS BLDV CALC-SCNC: -0.2 MMOL/L — SIGNIFICANT CHANGE UP (ref -2–3)
BASOPHILS # BLD AUTO: 0.06 K/UL — SIGNIFICANT CHANGE UP (ref 0–0.2)
BASOPHILS NFR BLD AUTO: 0.6 % — SIGNIFICANT CHANGE UP (ref 0–2)
BUN SERPL-MCNC: 16 MG/DL — SIGNIFICANT CHANGE UP (ref 7–23)
CA-I SERPL-SCNC: 1.17 MMOL/L — SIGNIFICANT CHANGE UP (ref 1.15–1.33)
CALCIUM SERPL-MCNC: 8.3 MG/DL — LOW (ref 8.4–10.5)
CHLORIDE BLDV-SCNC: 103 MMOL/L — SIGNIFICANT CHANGE UP (ref 96–108)
CHLORIDE SERPL-SCNC: 104 MMOL/L — SIGNIFICANT CHANGE UP (ref 98–107)
CO2 BLDV-SCNC: 26.1 MMOL/L — HIGH (ref 22–26)
CO2 SERPL-SCNC: 23 MMOL/L — SIGNIFICANT CHANGE UP (ref 22–31)
CREAT SERPL-MCNC: 0.78 MG/DL — SIGNIFICANT CHANGE UP (ref 0.5–1.3)
EGFR: 92 ML/MIN/1.73M2 — SIGNIFICANT CHANGE UP
EOSINOPHIL # BLD AUTO: 1.22 K/UL — HIGH (ref 0–0.5)
EOSINOPHIL NFR BLD AUTO: 12.4 % — HIGH (ref 0–6)
GAS PNL BLDV: 131 MMOL/L — LOW (ref 136–145)
GAS PNL BLDV: SIGNIFICANT CHANGE UP
GLUCOSE BLDV-MCNC: 120 MG/DL — HIGH (ref 70–99)
GLUCOSE SERPL-MCNC: 121 MG/DL — HIGH (ref 70–99)
HCO3 BLDV-SCNC: 25 MMOL/L — SIGNIFICANT CHANGE UP (ref 22–29)
HCT VFR BLD CALC: 35.5 % — LOW (ref 39–50)
HCT VFR BLDA CALC: 35 % — LOW (ref 39–51)
HGB BLD CALC-MCNC: 11.7 G/DL — LOW (ref 13–17)
HGB BLD-MCNC: 11.3 G/DL — LOW (ref 13–17)
IANC: 5.06 K/UL — SIGNIFICANT CHANGE UP (ref 1.8–7.4)
IMM GRANULOCYTES NFR BLD AUTO: 1.5 % — HIGH (ref 0–0.9)
LACTATE BLDV-MCNC: 1.3 MMOL/L — SIGNIFICANT CHANGE UP (ref 0.5–2)
LYMPHOCYTES # BLD AUTO: 1.82 K/UL — SIGNIFICANT CHANGE UP (ref 1–3.3)
LYMPHOCYTES # BLD AUTO: 18.6 % — SIGNIFICANT CHANGE UP (ref 13–44)
MAGNESIUM SERPL-MCNC: 2 MG/DL — SIGNIFICANT CHANGE UP (ref 1.6–2.6)
MCHC RBC-ENTMCNC: 30.4 PG — SIGNIFICANT CHANGE UP (ref 27–34)
MCHC RBC-ENTMCNC: 31.8 GM/DL — LOW (ref 32–36)
MCV RBC AUTO: 95.4 FL — SIGNIFICANT CHANGE UP (ref 80–100)
MONOCYTES # BLD AUTO: 1.49 K/UL — HIGH (ref 0–0.9)
MONOCYTES NFR BLD AUTO: 15.2 % — HIGH (ref 2–14)
NEUTROPHILS # BLD AUTO: 5.06 K/UL — SIGNIFICANT CHANGE UP (ref 1.8–7.4)
NEUTROPHILS NFR BLD AUTO: 51.7 % — SIGNIFICANT CHANGE UP (ref 43–77)
NRBC # BLD: 0 /100 WBCS — SIGNIFICANT CHANGE UP (ref 0–0)
NRBC # FLD: 0 K/UL — SIGNIFICANT CHANGE UP (ref 0–0)
PCO2 BLDV: 41 MMHG — LOW (ref 42–55)
PH BLDV: 7.39 — SIGNIFICANT CHANGE UP (ref 7.32–7.43)
PHOSPHATE SERPL-MCNC: 2.7 MG/DL — SIGNIFICANT CHANGE UP (ref 2.5–4.5)
PLATELET # BLD AUTO: 254 K/UL — SIGNIFICANT CHANGE UP (ref 150–400)
PO2 BLDV: 45 MMHG — SIGNIFICANT CHANGE UP
POTASSIUM BLDV-SCNC: 4 MMOL/L — SIGNIFICANT CHANGE UP (ref 3.5–5.1)
POTASSIUM SERPL-MCNC: 4.2 MMOL/L — SIGNIFICANT CHANGE UP (ref 3.5–5.3)
POTASSIUM SERPL-SCNC: 4.2 MMOL/L — SIGNIFICANT CHANGE UP (ref 3.5–5.3)
RBC # BLD: 3.72 M/UL — LOW (ref 4.2–5.8)
RBC # FLD: 15.8 % — HIGH (ref 10.3–14.5)
SAO2 % BLDV: 71.6 % — SIGNIFICANT CHANGE UP
SODIUM SERPL-SCNC: 134 MMOL/L — LOW (ref 135–145)
WBC # BLD: 9.8 K/UL — SIGNIFICANT CHANGE UP (ref 3.8–10.5)
WBC # FLD AUTO: 9.8 K/UL — SIGNIFICANT CHANGE UP (ref 3.8–10.5)

## 2022-09-30 PROCEDURE — 71045 X-RAY EXAM CHEST 1 VIEW: CPT | Mod: 26

## 2022-09-30 PROCEDURE — 99233 SBSQ HOSP IP/OBS HIGH 50: CPT | Mod: FS

## 2022-09-30 PROCEDURE — 99233 SBSQ HOSP IP/OBS HIGH 50: CPT

## 2022-09-30 RX ORDER — LEVALBUTEROL 1.25 MG/.5ML
0.63 SOLUTION, CONCENTRATE RESPIRATORY (INHALATION) EVERY 6 HOURS
Refills: 0 | Status: DISCONTINUED | OUTPATIENT
Start: 2022-09-30 | End: 2022-10-01

## 2022-09-30 RX ORDER — SACUBITRIL AND VALSARTAN 24; 26 MG/1; MG/1
1 TABLET, FILM COATED ORAL
Qty: 60 | Refills: 0
Start: 2022-09-30 | End: 2022-10-29

## 2022-09-30 RX ORDER — METOPROLOL TARTRATE 50 MG
12.5 TABLET ORAL EVERY 12 HOURS
Refills: 0 | Status: DISCONTINUED | OUTPATIENT
Start: 2022-09-30 | End: 2022-09-30

## 2022-09-30 RX ORDER — SODIUM CHLORIDE 9 MG/ML
4 INJECTION INTRAMUSCULAR; INTRAVENOUS; SUBCUTANEOUS EVERY 6 HOURS
Refills: 0 | Status: DISCONTINUED | OUTPATIENT
Start: 2022-09-30 | End: 2022-10-01

## 2022-09-30 RX ORDER — OXYCODONE HYDROCHLORIDE 5 MG/1
5 TABLET ORAL EVERY 6 HOURS
Refills: 0 | Status: DISCONTINUED | OUTPATIENT
Start: 2022-09-30 | End: 2022-10-01

## 2022-09-30 RX ORDER — ACETAMINOPHEN 500 MG
1000 TABLET ORAL ONCE
Refills: 0 | Status: DISCONTINUED | OUTPATIENT
Start: 2022-09-30 | End: 2022-10-01

## 2022-09-30 RX ORDER — HYDROMORPHONE HYDROCHLORIDE 2 MG/ML
0.25 INJECTION INTRAMUSCULAR; INTRAVENOUS; SUBCUTANEOUS EVERY 6 HOURS
Refills: 0 | Status: DISCONTINUED | OUTPATIENT
Start: 2022-09-30 | End: 2022-10-01

## 2022-09-30 RX ORDER — METOPROLOL TARTRATE 50 MG
12.5 TABLET ORAL EVERY 12 HOURS
Refills: 0 | Status: DISCONTINUED | OUTPATIENT
Start: 2022-09-30 | End: 2022-10-01

## 2022-09-30 RX ORDER — ACETAMINOPHEN 500 MG
650 TABLET ORAL EVERY 6 HOURS
Refills: 0 | Status: DISCONTINUED | OUTPATIENT
Start: 2022-09-30 | End: 2022-10-01

## 2022-09-30 RX ADMIN — LIDOCAINE 1 PATCH: 4 CREAM TOPICAL at 16:48

## 2022-09-30 RX ADMIN — LEVALBUTEROL 0.63 MILLIGRAM(S): 1.25 SOLUTION, CONCENTRATE RESPIRATORY (INHALATION) at 15:49

## 2022-09-30 RX ADMIN — LEVALBUTEROL 0.63 MILLIGRAM(S): 1.25 SOLUTION, CONCENTRATE RESPIRATORY (INHALATION) at 21:52

## 2022-09-30 RX ADMIN — SODIUM CHLORIDE 4 MILLILITER(S): 9 INJECTION INTRAMUSCULAR; INTRAVENOUS; SUBCUTANEOUS at 15:50

## 2022-09-30 RX ADMIN — Medication 40 MILLIGRAM(S): at 05:05

## 2022-09-30 RX ADMIN — SACUBITRIL AND VALSARTAN 1 TABLET(S): 24; 26 TABLET, FILM COATED ORAL at 18:32

## 2022-09-30 RX ADMIN — Medication 12.5 MILLIGRAM(S): at 18:29

## 2022-09-30 RX ADMIN — CHLORHEXIDINE GLUCONATE 1 APPLICATION(S): 213 SOLUTION TOPICAL at 05:05

## 2022-09-30 RX ADMIN — HEPARIN SODIUM 5000 UNIT(S): 5000 INJECTION INTRAVENOUS; SUBCUTANEOUS at 13:38

## 2022-09-30 RX ADMIN — LIDOCAINE 1 PATCH: 4 CREAM TOPICAL at 02:35

## 2022-09-30 RX ADMIN — Medication 150 MILLIGRAM(S): at 13:38

## 2022-09-30 RX ADMIN — ZOLPIDEM TARTRATE 5 MILLIGRAM(S): 10 TABLET ORAL at 22:16

## 2022-09-30 RX ADMIN — MIRTAZAPINE 7.5 MILLIGRAM(S): 45 TABLET, ORALLY DISINTEGRATING ORAL at 22:16

## 2022-09-30 RX ADMIN — TAMSULOSIN HYDROCHLORIDE 0.4 MILLIGRAM(S): 0.4 CAPSULE ORAL at 22:15

## 2022-09-30 RX ADMIN — HEPARIN SODIUM 5000 UNIT(S): 5000 INJECTION INTRAVENOUS; SUBCUTANEOUS at 22:16

## 2022-09-30 RX ADMIN — HEPARIN SODIUM 5000 UNIT(S): 5000 INJECTION INTRAVENOUS; SUBCUTANEOUS at 05:05

## 2022-09-30 RX ADMIN — SENNA PLUS 2 TABLET(S): 8.6 TABLET ORAL at 22:15

## 2022-09-30 RX ADMIN — SACUBITRIL AND VALSARTAN 1 TABLET(S): 24; 26 TABLET, FILM COATED ORAL at 06:43

## 2022-09-30 RX ADMIN — ROSUVASTATIN CALCIUM 20 MILLIGRAM(S): 5 TABLET ORAL at 22:15

## 2022-09-30 RX ADMIN — POLYETHYLENE GLYCOL 3350 17 GRAM(S): 17 POWDER, FOR SOLUTION ORAL at 13:39

## 2022-09-30 RX ADMIN — DORNASE ALFA 2.5 MILLIGRAM(S): 1 SOLUTION RESPIRATORY (INHALATION) at 21:52

## 2022-09-30 RX ADMIN — LIDOCAINE 1 PATCH: 4 CREAM TOPICAL at 21:26

## 2022-09-30 RX ADMIN — Medication 3 MILLILITER(S): at 04:43

## 2022-09-30 RX ADMIN — Medication 81 MILLIGRAM(S): at 13:38

## 2022-09-30 RX ADMIN — SODIUM CHLORIDE 4 MILLILITER(S): 9 INJECTION INTRAMUSCULAR; INTRAVENOUS; SUBCUTANEOUS at 22:55

## 2022-09-30 NOTE — PROGRESS NOTE ADULT - NS ATTEND AMEND GEN_ALL_CORE FT
Renal attd    - Hyponatemia of little significance   -Eventual metoprolol succinate   Possible farxiga as outpt     Sayed [facility]   [phone]

## 2022-09-30 NOTE — PROGRESS NOTE ADULT - SUBJECTIVE AND OBJECTIVE BOX
CHIEF COMPLAINT: FOLLOW UP IN ICU FOR POSTOPERATIVE CARE OF PATIENT WHO IS S/P  RUL lobectomy, cardiogenic shock            PROCEDURES:    R VATS, R Upper Lobectomy. 23-Sep-2022         ISSUES:    Lung mass  Systolic heart failure (EF 25%)  Diastolic heart failure stage 2  Severe pulmonary hypertension  CAD  Post op pain  Anxiety  Depression  Bipolar disorder  BPH  Hyponatremia        INTERVAL EVENTS:    Chest tube removed yesterday. Remains off dobutamine.  Remains off pressors with midodrine PO wean.        HISTORY:    Patient reports moderate pain at chest wall incision sites which is worse with coughing and deep breathing without associated fever or dyspnea. Pain is improved with use of pain meds.            PHYSICAL EXAM:   Gen: Comfortable, No acute distress  Eyes: Sclera white, Conjunctiva normal, Eyelids normal, Pupils symmetrical   ENT: Mucous membranes moist,  ,  ,    Neck: Trachea midline,  ,  ,  ,  ,  ,    CV: Rate regular, Rhythm regular,  ,  ,    Resp: Breath sounds clear, No accessory muscles use,   Abd: Soft, Non-distended, Non-tender, Bowel sounds normal,  ,  ,    Skin: Warm, No peripheral edema of lower extremities,  ,    : No gerardo  Neuro: Moving all 4 extremities,    Psych: A&Ox3              ASSESSMENT AND PLAN:           NEURO:     Post-operative Pain - Stable. Pain control with oxycodone PO    Depression, Anxiety, Bipolar disorder - stable. continue Effextor XR daily.         RESPIRATORY:     Stable on room air - Incentive spirometry. Chest PT and suctioning of secretions. Out of bed to chair and ambulate with assistance. Continuous pulse oximetry for support & to prevent decompensation.             CARDIOVASCULAR:     Hemodynamically stable - Not on pressors. Continue hemodynamic monitoring.    CAD - stable. Continue aspirin. Cardiology consult. Troponin x2 negative since post op on 9/23/22.         CHF - stable. maintain euvolemia. Furthur cardiac workup per Cardiology consult.     Telemetry (medical test) - Reviewed by me today independently. Normal sinus rhythm.      RENAL:     BRODY – Resolved.    BPH - Stable. Flomax qday    Hyponatremia - Improving. Monitor Na. Avoid overcorrection. Goal to correct no more than 10 every 24 hours.              GASTROINTESTINAL:     GI prophylaxis with pantoprazole    Zofran and Reglan IV PRN for nausea     Regular consistency diet          HEMATOLOGIC:     No signs of active bleeding. Monitor Hgb in CBC in AM     DVT prophylaxis with heparin subQ and SCDs.             INFECTIOUS DISEASE:     All surgical sites appear clean. No signs of active infection. Will monitor for fever and leukocytosis.           ENDOCRINE:     Stable – Monitor glucose fingersticks for goal 120-180.        ONCOLOGY:     Lung mass - Improved. S/P resection. Follow up final pathology.         Pertinent clinical, laboratory, radiographic, hemodynamic, echocardiographic, respiratory data, microbiologic data and chart were reviewed by myself and analyzed frequently throughout the course of the day and night by myself.     Plan discussed at length with the CTICU staff and Attending CT Surgeon -   Dr Stevie Padilla        _________________________  VITAL SIGNS:  Vital Signs Last 24 Hrs  T(C): 36.6 (30 Sep 2022 12:00), Max: 36.7 (30 Sep 2022 00:00)  T(F): 97.8 (30 Sep 2022 12:00), Max: 98 (30 Sep 2022 00:00)  HR: 89 (30 Sep 2022 15:50) (81 - 100)  BP: 121/76 (30 Sep 2022 12:00) (113/71 - 121/76)  BP(mean): 87 (30 Sep 2022 12:00) (83 - 87)  RR: 17 (30 Sep 2022 12:00) (14 - 26)  SpO2: 95% (30 Sep 2022 15:50) (95% - 100%)    Parameters below as of 30 Sep 2022 15:50  Patient On (Oxygen Delivery Method): room air      I/Os:   I&O's Detail    29 Sep 2022 07:01  -  30 Sep 2022 07:00  --------------------------------------------------------  IN:    IV PiggyBack: 250 mL    Oral Fluid: 1510 mL  Total IN: 1760 mL    OUT:    Chest Tube (mL): 30 mL    Indwelling Catheter - Urethral (mL): 2170 mL  Total OUT: 2200 mL    Total NET: -440 mL      30 Sep 2022 07:01  -  30 Sep 2022 17:04  --------------------------------------------------------  IN:  Total IN: 0 mL    OUT:    Indwelling Catheter - Urethral (mL): 400 mL  Total OUT: 400 mL    Total NET: -400 mL              MEDICATIONS:  MEDICATIONS  (STANDING):  acetaminophen   IVPB .. 1000 milliGRAM(s) IV Intermittent once  aspirin enteric coated 81 milliGRAM(s) Oral daily  chlorhexidine 4% Liquid 1 Application(s) Topical <User Schedule>  dornase delilah Solution 2.5 milliGRAM(s) Inhalation two times a day  furosemide    Tablet 40 milliGRAM(s) Oral daily  heparin   Injectable 5000 Unit(s) SubCutaneous every 8 hours  levalbuterol Inhalation 0.63 milliGRAM(s) Inhalation every 6 hours  lidocaine   4% Patch 1 Patch Transdermal daily  metoprolol tartrate 12.5 milliGRAM(s) Oral every 12 hours  mirtazapine 7.5 milliGRAM(s) Oral at bedtime  pantoprazole  Injectable 40 milliGRAM(s) IV Push daily  polyethylene glycol 3350 17 Gram(s) Oral daily  rosuvastatin 20 milliGRAM(s) Oral at bedtime  sacubitril 24 mG/valsartan 26 mG 1 Tablet(s) Oral two times a day  senna 2 Tablet(s) Oral at bedtime  sodium chloride 3%  Inhalation 4 milliLiter(s) Inhalation every 6 hours  tamsulosin 0.4 milliGRAM(s) Oral at bedtime  venlafaxine XR. 150 milliGRAM(s) Oral daily    MEDICATIONS  (PRN):  acetaminophen     Tablet .. 650 milliGRAM(s) Oral every 6 hours PRN Mild Pain (1 - 3)  HYDROmorphone  Injectable 0.25 milliGRAM(s) IV Push every 6 hours PRN Severe Pain (7 - 10)  melatonin 3 milliGRAM(s) Oral at bedtime PRN Insomnia  oxyCODONE    IR 5 milliGRAM(s) Oral every 6 hours PRN Moderate Pain (4 - 6)  polyethylene glycol 3350 17 Gram(s) Oral daily PRN Constipation  zolpidem 5 milliGRAM(s) Oral at bedtime PRN Insomnia      LABS:  Laboratory data was independently reviewed by me today.                           11.3   9.80  )-----------( 254      ( 30 Sep 2022 04:30 )             35.5     09-30    134<L>  |  104  |  16  ----------------------------<  121<H>  4.2   |  23  |  0.78    Ca    8.3<L>      30 Sep 2022 04:30  Phos  2.7     09-30  Mg     2.00     09-30          ABG - ( 29 Sep 2022 04:33 )  pH, Arterial: 7.46  pH, Blood: x     /  pCO2: 33    /  pO2: 77    / HCO3: 24    / Base Excess: 0.2   /  SaO2: 95.7                  RADIOLOGY:   Radiology images were independently reviewed by me today. Reports were reviewed by me today.    Xray Chest 1 View- PORTABLE-Routine:   ACC: [id number] EXAM:  XR CHEST PORTABLE ROUTINE 1V                          PROCEDURE DATE:  09/30/2022          INTERPRETATION:  Clinical history: 77-year-old male, status post VATS,   rule out pneumothorax.    Single view of the chest is compared to 9/29/2022 and demonstrates mild   cardiomegaly and normal pulmonary vasculature with no consolidation,   effusion, pneumothorax or acute osseous findings.    Right IJ line with the tip at the junction of the SVC and right atrium,   unchanged.    Mild platelike atelectasis in the right midlung, slightly increased.    IMPRESSION:  No pneumothorax, finding unchanged.    Platelike atelectasis in the right midlung peripherally, slightly   increased    --- End of Report ---            YEN DIXON; Attending Radiologist  This document has been electronically signed. Sep 30 2022  3:47PM (09-30-22 @ 05:56)  Xray Chest 1 View- PORTABLE-Urgent:   ACC: [id number] EXAM:  XR CHEST PORTABLE URGENT 1V                        ACC: [id number] EXAM:  XR CHEST PORTABLE URGENT 1V                          PROCEDURE DATE:  09/29/2022          INTERPRETATION:  CLINICAL INDICATION:  Follow-up chest tube removal    COMPARISON: Chest radiograph dated 9/29/2022 at 3:17 AM and 6:13 PM    TECHNIQUE: Frontal radiographs of the chest.    FINDINGS:    2:59 PM:  Support devices: Right IJ central line terminates in the SVC. Interval   removal of right chest tube.    Cardiac/mediastinum/hilum: Heart size cannot be accurately assessed on   this projection.    Lung parenchyma/Pleura: Right lung atelectasis, unchanged. There is no   pleural effusion. No pneumothorax.    Skeleton/soft tissues: Cutaneous surgical staples overlie the right   axilla. No acute osseous abnormalities.    6:13 PM:  No interval change. Loss of volume in the right lung post thoracotomy. No   effusion or pneumothorax.        IMPRESSION: Status post chest tube removal without complications.            --- End of Report ---          UNA BENOIT MD; Resident Radiologist  This document has been electronically signed.  SARA KIMBLE MD; Attending Radiologist  This document has been electronically signed. Sep 29 2022  7:49PM (09-29-22 @ 18:32)  Xray Chest 1 View- PORTABLE-Urgent:   ACC: [id number] EXAM:  XR CHEST PORTABLE URGENT 1V                        ACC: [id number] EXAM:  XR CHEST PORTABLE URGENT 1V                          PROCEDURE DATE:  09/29/2022          INTERPRETATION:  CLINICAL INDICATION:  Follow-up chest tube removal    COMPARISON: Chest radiograph dated 9/29/2022 at 3:17 AM and 6:13 PM    TECHNIQUE: Frontal radiographs of the chest.    FINDINGS:    2:59 PM:  Support devices: Right IJ central line terminates in the SVC. Interval   removal of right chest tube.    Cardiac/mediastinum/hilum: Heart size cannot be accurately assessed on   this projection.    Lung parenchyma/Pleura: Right lung atelectasis, unchanged. There is no   pleural effusion. No pneumothorax.    Skeleton/soft tissues: Cutaneous surgical staples overlie the right   axilla. No acute osseous abnormalities.    6:13 PM:  No interval change. Loss of volume in the right lung post thoracotomy. No   effusion or pneumothorax.        IMPRESSION: Status post chest tube removal without complications.            --- End of Report ---          UNA BENOIT MD; Resident Radiologist  This document has been electronically signed.  SARA KIMBLE MD; Attending Radiologist  This document has been electronically signed. Sep 29 2022  7:49PM (09-29-22 @ 15:21)

## 2022-09-30 NOTE — PROGRESS NOTE ADULT - SUBJECTIVE AND OBJECTIVE BOX
NEPHROLOGY-[facility] [phone]        Patient seen and examined on room air, CT removed yesterday.         MEDICATIONS  (STANDING):  albuterol/ipratropium for Nebulization 3 milliLiter(s) Nebulizer every 6 hours  aspirin enteric coated 81 milliGRAM(s) Oral daily  chlorhexidine 4% Liquid 1 Application(s) Topical <User Schedule>  dornase delilah Solution 2.5 milliGRAM(s) Inhalation two times a day  furosemide    Tablet 40 milliGRAM(s) Oral daily  heparin   Injectable 5000 Unit(s) SubCutaneous every 8 hours  lidocaine   4% Patch 1 Patch Transdermal daily  mirtazapine 7.5 milliGRAM(s) Oral at bedtime  pantoprazole  Injectable 40 milliGRAM(s) IV Push daily  polyethylene glycol 3350 17 Gram(s) Oral daily  rosuvastatin 20 milliGRAM(s) Oral at bedtime  sacubitril 24 mG/valsartan 26 mG 1 Tablet(s) Oral two times a day  senna 2 Tablet(s) Oral at bedtime  sodium chloride 3%  Inhalation 4 milliLiter(s) Inhalation every 12 hours  tamsulosin 0.4 milliGRAM(s) Oral at bedtime  venlafaxine XR. 150 milliGRAM(s) Oral daily      VITAL:  T(C): , Max: 36.7 (09-30-22 @ 00:00)  T(F): , Max: 98 (09-30-22 @ 00:00)  HR: 100 (09-30-22 @ 08:00)  BP: 113/71 (09-30-22 @ 06:00)  BP(mean): 83 (09-30-22 @ 06:00)  RR: 24 (09-30-22 @ 08:00)  SpO2: 77% (09-30-22 @ 08:00)  Wt(kg): --    I and O's:    09-29 @ 07:01  -  09-30 @ 07:00  --------------------------------------------------------  IN: 1760 mL / OUT: 2200 mL / NET: -440 mL          PHYSICAL EXAM:    Constitutional: NAD  Neck:  No JVD  Respiratory: CTAB/L  Cardiovascular: S1 and S2  Gastrointestinal: BS+, soft, NT/ND  Extremities: No peripheral edema  Neurological: A/O x 3, no focal deficits  Psychiatric: Normal mood, normal affect  : + [place]  Skin: No rashes  Access: Not applicable    LABS:                        11.3   9.80  )-----------( 254      ( 30 Sep 2022 04:30 )             35.5     09-30    134<L>  |  104  |  16  ----------------------------<  121<H>  4.2   |  23  |  0.78    Ca    8.3<L>      30 Sep 2022 04:30  Phos  2.7     09-30  Mg     2.00     09-30      RADIOLOGY & ADDITIONAL STUDIES:    < from: Xray Chest 1 View- PORTABLE-Urgent (Xray Chest 1 View- PORTABLE-Urgent .) (09.29.22 @ 18:32) >  FINDINGS:    2:59 PM:  Support devices: Right IJ central line terminates in the SVC. Interval   removal of right chest tube.    Cardiac/mediastinum/hilum: Heart size cannot be accurately assessed on   this projection.    Lung parenchyma/Pleura: Right lung atelectasis, unchanged. There is no   pleural effusion. No pneumothorax.    Skeleton/soft tissues: Cutaneous surgical staples overlie the right   axilla. No acute osseous abnormalities.    6:13 PM:  No interval change. Loss of volume in the right lung post thoracotomy. No   effusion or pneumothorax.        IMPRESSION: Status post chest tube removal without complications.    < end of copied text >           Breath Sounds equal & clear to percussion & auscultation, no accessory muscle use

## 2022-09-30 NOTE — PROGRESS NOTE ADULT - ASSESSMENT
ASSESSMENT:   77 yr old male presents with hx of bipolar, BPH, SCC of leg, CHF EF 25% severe segmental LV systolic dysfunction, CAD, hospitalized on May 2022 for acute MI s/p cardiac Cath with no intervention (revealed  RCA and 70% occlusion prox LAD, 1st diagonal 70% stenosis  Right VATs, right upper lobectomy 22  Hypotension sp IVF and sp pressors.  Hypotension from cardiogenic shock leading to BRODY - off pressors, and off  gtt  Volume status is acceptable  - 2.8L UOP in 24 hours     1 Renal- BRODY was from cariogenic shock and he is now off  gtt, s/p lasix 10 mg IV () -resolved, now on lasix 40 mg po daily   2 CVS-Severe global reduction in EF , entresto 24-26 mg 1 tab bid started, BP acceptable   3 Pulm- CXR improving, no effusions, s/p CT removal yesterday     RECOMMENDATIONS:   - Off  gtt.  now on midodrine 5 mg prn but has not needed   - Maintain negative outpt, continue lasix 40 mg po daily   - C/w fluid restriction 1.2L per day   - Started on Enresto 24- 26 mg po bid  - Eventual Toprol       [facility] NP  [facility]  [phone]    ASSESSMENT:   77 yr old male presents with hx of bipolar, BPH, SCC of leg, CHF EF 25% severe segmental LV systolic dysfunction, CAD, hospitalized on May 2022 for acute MI s/p cardiac Cath with no intervention (revealed  RCA and 70% occlusion prox LAD, 1st diagonal 70% stenosis  Right VATs, right upper lobectomy 22  Hypotension sp IVF and sp pressors.  Hypotension from cardiogenic shock leading to BRODY - off pressors, and off  gtt  Volume status is acceptable  - 2.8L UOP in 24 hours     1 Renal- BRODY was from cariogenic shock  -resolved, now on lasix 40 mg po daily   2 CVS-Severe global reduction in EF , entresto 24-26 mg 1 tab bid started, BP acceptable   3 Pulm- CXR improving, no effusions, s/p CT removal yesterday     RECOMMENDATIONS:   - Off  gtt.    - Maintain negative outpt, continue lasix 40 mg po daily   - C/w fluid restriction 1.2L per day   - Started on Enresto 24- 26 mg po bid  - Eventual Toprol       [place] [facility]  [facility]  [phone]

## 2022-09-30 NOTE — PROGRESS NOTE ADULT - REASON FOR ADMISSION
RVATS, RUL lobectomy
SOB
SOB
RUL lobectomy
RVATS, RUL lobectomy
I need surgery
I need sugery
I need surgery
I need surgery

## 2022-09-30 NOTE — PROGRESS NOTE ADULT - ASSESSMENT
77 year old Male with PMH of bipolar disorder, CAD/STEMI  (5/2022) s/p R/LHC with IABP support which showed   mid %, LAD 70%, D1 70% and RA 15, PCWP 31, PA sat 59%, CO 3.63, CI 1.70, PVR 2.76; medical management recommended; TTE showed LV thrombus treated with Coumadin, HFrEF (EF 34%, LVIDd 6.1, moderate TR and severe PH), RUL mass s/p R. VATS (9/23/2022) c/b cardiogenic shock requiring inotropic support and diuretics. Pertinent labs:  AST 2768, ALT 1653 RHC:  RA 15, PCWP 31, PA sat 59%, CO 3.63, CI 1.70, PVR 2.76.   9/28 Dobutamine weaned off and hemodynamically stable   9/29 Lactate = 1.3 and VSat = 53.9%  CO/CI 3.2/1.5   BNP decreased to 3,514   AST/ALT decreased to 351/652       Lasix 40mg daily   Entresto 24/26mg twice daily (hold for SBP <90)     Midodrine 5mg Q8H prn; Please discontinue        Please restart Toprol XL 25mg daily (at 12noon)    Strict I/O  Daily standing weights  Monitor lytes replete K>4.0 and Mg>2.0  Management per CTICU team   Follow up appointment with Dr. Shell on 10/13 at 9am  Pending final recommendations from HF attending   77 year old Male with PMH of bipolar disorder, CAD/STEMI  (5/2022) s/p R/LHC with IABP support which showed   mid %, LAD 70%, D1 70% and RA 15, PCWP 31, PA sat 59%, CO 3.63, CI 1.70, PVR 2.76; medical management recommended; TTE showed LV thrombus treated with Coumadin, HFrEF (EF 34%, LVIDd 6.1, moderate TR and severe PH), RUL mass s/p R. VATS (9/23/2022) c/b cardiogenic shock requiring inotropic support and diuretics. Pertinent labs:  AST 2768, ALT 1653 RHC:  RA 15, PCWP 31, PA sat 59%, CO 3.63, CI 1.70, PVR 2.76.   9/28 Dobutamine weaned off and hemodynamically stable   9/29 Lactate = 1.3 and VSat = 53.9%  CO/CI 3.2/1.5   BNP decreased to 3,514   AST/ALT decreased to 351/652       Lasix 40mg daily   Entresto 24/26mg twice daily (hold for SBP <90)     Midodrine 5mg Q8H prn; Please discontinue        Please restart Toprol XL 12.5mg twice daily     Strict I/O  Daily standing weights  Monitor lytes replete K>4.0 and Mg>2.0  Management per CTICU team   Follow up appointment with Dr. Shell on 10/13 at 9am  Pending final recommendations from HF attending

## 2022-09-30 NOTE — PROGRESS NOTE ADULT - NS ATTEND OPT1 GEN_ALL_CORE

## 2022-09-30 NOTE — PROGRESS NOTE ADULT - SUBJECTIVE AND OBJECTIVE BOX
Interval History:    Medications:  aspirin enteric coated 81 milliGRAM(s) Oral daily  chlorhexidine 4% Liquid 1 Application(s) Topical <User Schedule>  dornase delilah Solution 2.5 milliGRAM(s) Inhalation two times a day  furosemide    Tablet 40 milliGRAM(s) Oral daily  heparin   Injectable 5000 Unit(s) SubCutaneous every 8 hours  HYDROmorphone  Injectable 0.25 milliGRAM(s) IV Push every 6 hours PRN  lidocaine   4% Patch 1 Patch Transdermal daily  melatonin 3 milliGRAM(s) Oral at bedtime PRN  midodrine 5 milliGRAM(s) Oral every 8 hours PRN  mirtazapine 7.5 milliGRAM(s) Oral at bedtime  oxyCODONE    IR 5 milliGRAM(s) Oral every 6 hours PRN  pantoprazole  Injectable 40 milliGRAM(s) IV Push daily  polyethylene glycol 3350 17 Gram(s) Oral daily  polyethylene glycol 3350 17 Gram(s) Oral daily PRN  rosuvastatin 20 milliGRAM(s) Oral at bedtime  sacubitril 24 mG/valsartan 26 mG 1 Tablet(s) Oral two times a day  senna 2 Tablet(s) Oral at bedtime  sodium chloride 3%  Inhalation 4 milliLiter(s) Inhalation every 6 hours  tamsulosin 0.4 milliGRAM(s) Oral at bedtime  venlafaxine XR. 150 milliGRAM(s) Oral daily  zolpidem 5 milliGRAM(s) Oral at bedtime PRN      Vitals:  T(C): 36.7 (09-30-22 @ 08:00), Max: 36.7 (09-30-22 @ 00:00)  HR: 100 (09-30-22 @ 08:00) (81 - 100)  BP: 113/71 (09-30-22 @ 08:00) (113/71 - 113/71)  BP(mean): 83 (09-30-22 @ 08:00) (83 - 83)  RR: 20 (09-30-22 @ 08:00) (13 - 26)  SpO2: 98% (09-30-22 @ 08:00) (92% - 100%)    Daily     Daily         I&O's Summary    29 Sep 2022 07:01  -  30 Sep 2022 07:00  --------------------------------------------------------  IN: 1760 mL / OUT: 2200 mL / NET: -440 mL    30 Sep 2022 07:01  -  30 Sep 2022 10:25  --------------------------------------------------------  IN: 0 mL / OUT: 400 mL / NET: -400 mL        Physical Exam:  Appearance: No Acute Distress  HEENT: PERRL  Neck: No JVD  Cardiovascular: Normal S1 S2, No murmurs/rubs/gallops  Respiratory: Clear to auscultation bilaterally  Gastrointestinal: Soft, Non-tender	  Skin: No cyanosis	  Neurologic: Non-focal  Extremities: No LE edema  Psychiatry: A & O x 3, Mood & affect appropriate    Labs:                        11.3   9.80  )-----------( 254      ( 30 Sep 2022 04:30 )             35.5     09-30    134<L>  |  104  |  16  ----------------------------<  121<H>  4.2   |  23  |  0.78    Ca    8.3<L>      30 Sep 2022 04:30  Phos  2.7     09-30  Mg     2.00     09-30              TELEMETRY:    Echocardiogram:   Interval History:  Patient resting comfortably in chair    He reports feeling well and ready for discharge   Denies CP/SOB/palpitations/dizziness  No acute events overnight      Medications:  aspirin enteric coated 81 milliGRAM(s) Oral daily  chlorhexidine 4% Liquid 1 Application(s) Topical <User Schedule>  dornase delilah Solution 2.5 milliGRAM(s) Inhalation two times a day  furosemide    Tablet 40 milliGRAM(s) Oral daily  heparin   Injectable 5000 Unit(s) SubCutaneous every 8 hours  HYDROmorphone  Injectable 0.25 milliGRAM(s) IV Push every 6 hours PRN  lidocaine   4% Patch 1 Patch Transdermal daily  melatonin 3 milliGRAM(s) Oral at bedtime PRN  midodrine 5 milliGRAM(s) Oral every 8 hours PRN  mirtazapine 7.5 milliGRAM(s) Oral at bedtime  oxyCODONE    IR 5 milliGRAM(s) Oral every 6 hours PRN  pantoprazole  Injectable 40 milliGRAM(s) IV Push daily  polyethylene glycol 3350 17 Gram(s) Oral daily  polyethylene glycol 3350 17 Gram(s) Oral daily PRN  rosuvastatin 20 milliGRAM(s) Oral at bedtime  sacubitril 24 mG/valsartan 26 mG 1 Tablet(s) Oral two times a day  senna 2 Tablet(s) Oral at bedtime  sodium chloride 3%  Inhalation 4 milliLiter(s) Inhalation every 6 hours  tamsulosin 0.4 milliGRAM(s) Oral at bedtime  venlafaxine XR. 150 milliGRAM(s) Oral daily  zolpidem 5 milliGRAM(s) Oral at bedtime PRN      Vitals:  T(C): 36.7 (09-30-22 @ 08:00), Max: 36.7 (09-30-22 @ 00:00)  HR: 100 (09-30-22 @ 08:00) (81 - 100)  BP: 113/71 (09-30-22 @ 08:00) (113/71 - 113/71)  BP(mean): 83 (09-30-22 @ 08:00) (83 - 83)  RR: 20 (09-30-22 @ 08:00) (13 - 26)  SpO2: 98% (09-30-22 @ 08:00) (92% - 100%)    Daily     Daily         I&O's Summary    29 Sep 2022 07:01  -  30 Sep 2022 07:00  --------------------------------------------------------  IN: 1760 mL / OUT: 2200 mL / NET: -440 mL    30 Sep 2022 07:01  -  30 Sep 2022 10:25  --------------------------------------------------------  IN: 0 mL / OUT: 400 mL / NET: -400 mL        Physical Exam:  Appearance: No Acute Distress  Neck: No JVD  Cardiovascular: Normal S1 S2  Respiratory: Clear to auscultation bilaterally diminished RLL     Gastrointestinal: Soft, Non-tender	  Skin: No cyanosis	  Neurologic: Non-focal  Extremities: No LE edema  Psychiatry: A & O x 3, Mood & affect appropriate    Labs:                        11.3   9.80  )-----------( 254      ( 30 Sep 2022 04:30 )             35.5     09-30    134<L>  |  104  |  16  ----------------------------<  121<H>  4.2   |  23  |  0.78    Ca    8.3<L>      30 Sep 2022 04:30  Phos  2.7     09-30  Mg     2.00     09-30      TELEMETRY: Sinus Rhythm     Echocardiogram:  Transthoracic Echocardiogram (09.24.22 @ 14:33)     DIMENSIONS:  Dimensions:     Normal Values:  LA:     4.1 cm    2.0 - 4.0 cm  Ao:     3.7 cm    2.0 - 3.8 cm  SEPTUM: 1.2 cm    0.6 - 1.2 cm  PWT:    1.2 cm    0.6 - 1.1 cm  LVIDd:  6.1 cm    3.0 - 5.6 cm  LVIDs:    ---     1.8 - 4.0 cm  Derived Variables:  LVMI: 161 g/m2  RWT: 0.39  Ejection Fraction (Modified Pizarro Rule): 34 %  ------------------------------------------------------------------------  OBSERVATIONS:  Mitral Valve: Mitral annular calcification, otherwise  normal mitral valve. Moderate mitral regurgitation.  Aortic Root: Normal aortic root.  Aortic Valve: Calcified trileaflet aortic valve with normal  opening. Mild aortic regurgitation.  Left Atrium: Normal left atrium.  LA volume index = 28  cc/m2.  Left Ventricle: Severe global left ventricular systolic  dysfunction. Severe left ventricular enlargement. Moderate  diastolic dysfunction (Stage II).  Right Heart: Normal right atrium. Right ventricular  enlargement with normal right ventricular systolic  function. Normal tricuspid valve.    Moderate tricuspid  regurgitation. Normal pulmonic valve. Mild pulmonic  regurgitation.  Pericardium/PleuraNormal pericardium with no pericardial  effusion.  Hemodynamic: Estimated right ventricular systolic pressure  equals 82 mm Hg, assuming right atrial pressure equals 10  mm Hg, consistent with severe pulmonary hypertension.  ------------------------------------------------------------------------  CONCLUSIONS:  1. Mitral annular calcification, otherwise normal mitral  valve. Moderate mitral regurgitation.  2. Severe left ventricular enlargement.  3. Severe global left ventricularsystolic dysfunction.  4. Moderate diastolic dysfunction (Stage II).  5. Right ventricular enlargement with normal right  ventricular systolic function.  6. Estimated pulmonary artery systolic pressure equals 82  mm Hg, assuming right atrial pressureequals 10  mm Hg,  consistent with severe pulmonary hypertension.      < from: Cardiac Catheterization (05.11.22 @ 15:16) >  Intra-aortic Balloon Pump   An intra-aortic balloon pump was inserted.      Diagnostic Findings:     Coronary Angiography   The coronary circulation is right dominant.      LM   Left main artery: Angiography shows no disease.      LAD   Proximal left anterior descending: There is a 70 % stenosis. First  diagonal: There is a 70 % stenosis.    Patient: ELYSE MALAVE            MRN: [id number]  Study Date: 05/11/2022   03:16 PM      Page 1 of 4          CX   Circumflex: Angiography shows no disease.      RCA   Mid right coronary artery: There is a 100 % stenosis.      History and Risk Factors:   Hypertension:                             Yes   Dyslipidemia:                             Yes   Prior MI:                                 Yes     X-Ray:   Diagnostic Flouro time:     9.5 min.                   Exam record  DAP:          4132.57 cGycm2  Interventional Flouro time:                             Exam fluoro  DAP:          4582.49 cGycm2  Total Flouro Time:           9.5 min.                   Exam total  DAP:      8715.06  cGycm2    Exam Start Time:   03:16 PM   Exam End Time:     03:53 PM   Exam Duration:     37 min     Contrast:   Description                    Dose         Unit       Serial No.   Omnipaque                         23.000   mL     Medication:   Description                 Dose, Unit, Route, Time     Inventory:   Description                 Quantity     Peoplesoft#       Reference  No.    Serial No.     Lot No.       CDM  HEPARIN SALINE 1000 USP   1.000        [id number]   -50  [id number]  Units / 500 mL                         397   HEPARIN SALINE 1000 USP   1.000        [id number]   -55  [id number]  Units / 500 mL                         397   CATH PACK                 1.000        [id number]   [id number]  [id number]    030   .035 X 150 GUIDERIGHT     1.000        [id number]   [id number]  [id number]    594   MEDRAD PROVIS SYRINGE     1.000        [id number]   [id number]  33662926    797   OMNIPAQUE 350 150ML       1.000        [id number]   [id number]  [id number]    368   6FR SHEATH PINNACLE 1.000              [id number]   [id number]  29042860    644   8FR SHEATH PINNACLE       1.000        [id number]   [id number]  [id number]    056   EXPO FL4                  1.000        [id number]   [id number]  [id number]    837   EXPO FR4                  1.000        [id number]   [id number]  [id number]    Patient: ELYSE MALAVE            MRN: [id number]  Study Date: 05/11/2022   03:16 PM      Page 2 of 4          838   EXPO PGTL 145             1.000  [id number]   [id number]  [id number]    839   7.5 VIP SWAN              1.000        [id number]   [id number]  25381343    249   EXPO PGTL 145             1.000        [id number]   [id number]  09396070    839   SENSATION PLUS 50CC1.000        [id number]  0377--01                                 [id number]  FIBER                                  267               U   8FR SHEATH PINNACLE       1.000        [id number]   [id number]  86005225    056   KIT DRSG CVC CHG 40/CA    1.000        [id number]   [id number]  [id number]    160   Hemodynamic Pressures:   Phase         Location          [mmHg]               [mmHg]  [mmHg]           HR  Baseline      LV                  s      115  ed      36         88  Baseline     Ao                  s      126               d      85  m      102        103  Baseline      RV                  s      50  ed      17         81  Baseline      PA                  s      56                d      31  m   41         81  Baseline    PCW                 a      32                v      34  m   31         77  Baseline      RA                  a      20                v      16  m   15         95    Oxygen Saturations   Phase          Location        Hb             Sat pO2  Content        Group  Baseline        AO                       14             99  19.33   SA  Baseline        PA                       14             59  11.48   PA    Oxygen Saturation Average, Phase: Baseline   PV Hb                PV Sat            PV pO2  PV Content  SA Hb      14.40 g/dL SA Sat     98.70 %    SA pO2  SA Content     19.33 %  PA Hb      14.40 g/dL PA Sat     58.60 %    PA pO2  PA Content     11.48 %  MV Hb                MV Sat                 MV pO2  MV Content  Strokework:   Phase:                   Baseline   LVSW:                    34.02 g*m                 LVSW (index):  15.96 g*m/m2  RVSW:                    15.84 g*m                 RVSW (index):  7.43 g*m/m2  Flow Calculations, Phase: Baseline   CO              3.63 l/min    HR  O2Insp  CI                 1.70 l/min/m2    PO2  O2Exp  SV                                  VO2                 285.00 ml/min  O2(ExAir)  SVI                                 A-VO2  Hb                 14.40 gm/d  Shunts:   Qs                    3.63 l/min    Qs Index              1.70  l/min/m2  Qp                    3.63 l/min    Qp Index              1.70  l/min/m2    Qp/Qs  EPBF                                EPBF Index   L-R                                 L-R Index   R-L                                 R-L Index   Systemic Resistances, Phase: Baseline   SVR              1917.35 dyn*s/cm5                  TVR  2247.93 dyn*s/cm5    Patient: ELYSE MALAVE            MRN: [id number]  Study Date: 05/11/2022   03:16PM      Page 3 of 4          SVRI 4086.28 dyn*s*m2/cm5 TVRI 4790.81 dyn*s*m2/cm5   SVR              23.97 MERCADO                           TVR  28.11 MERCADO  SVRI             51.09 MERCADO*m2                        TVRI  59.90 MERCADO*m2  Pulmonary Resistances:   PVR            220.39 dyn*s/cm5                   TPR  903.58 dyn*s/cm5  PVRI             469.69 dyn*s*m2/cm5                TPRI  1925.72 dyn*s*m2/cm5  PVR              2.76 MERCADO                            TPR  11.30 MERCADO  PVRI             5.87 MERCADO*m2                  TPRI  24.08 MERCADO*m2  Ratios:   PVR-SVR          0.11                                TPR-TVR  0.4  Shunts:   venO2                                                CO   R-L Shunt                                            R-L Shunt   Sammy. Method   L-R Shunt                                           L-R Shunt   R-L Shunt                                           R-L Shunt   Sammy. Method     Conscious sedation was administered and monitored by Cardiology  nursing staff,  under my direct supervision when applicable.     Electronically signed by Stew Helms MD on 05/11/2022 at 05:17 PM           Cardiac Viability (05.12.22 @ 15:17)   NUCLEAR FINDINGS:  The left ventricle was normal in size. The mid to distal  anterior, mid to distalanteroseptal, apical, and inferior  walls do not demonstrate significant viability.  The  septum demonstrates mild viability. The remaining segments  are viable (predominantly the lateral wall only).  ------------------------------------------------------------------------  GATED ANALYSIS:  Rest gated wall motion analysis was performed (LVEF = 29  %;LVEDV = 205 ml.), revealing markedly reduced  LV  function with regional variation.  ------------------------------------------------------------------------  IMPRESSIONS:Abnormal Study  * The left ventricle was normal in size. The mid to distal  anterior, mid to distal anteroseptal, apical, and inferior  walls do not demonstrate significant viability.  The  septum demonstrates mild viability. Theremaining segments  are viable (predominantly the lateral wall only).  * Rest gated wall motion analysis was performed (LVEF = 29  %;LVEDV = 205 ml.), revealing markedly reduced  LV  function with regional variation.  Conclusion:  The left ventricle was normal in size. The mid to distal  anterior, mid to distal anteroseptal, apical, and inferior  walls do not demonstrate significant viability.  The  septum demonstrates mild viability. The remaining segments  are viable (predominantly the lateral wall only).  ------------------------------------------------------------------------

## 2022-10-01 ENCOUNTER — TRANSCRIPTION ENCOUNTER (OUTPATIENT)
Age: 77
End: 2022-10-01

## 2022-10-01 VITALS
OXYGEN SATURATION: 98 % | DIASTOLIC BLOOD PRESSURE: 72 MMHG | HEART RATE: 94 BPM | RESPIRATION RATE: 20 BRPM | SYSTOLIC BLOOD PRESSURE: 110 MMHG

## 2022-10-01 LAB
ANION GAP SERPL CALC-SCNC: 9 MMOL/L — SIGNIFICANT CHANGE UP (ref 7–14)
APTT BLD: 40.3 SEC — HIGH (ref 27–36.3)
BUN SERPL-MCNC: 21 MG/DL — SIGNIFICANT CHANGE UP (ref 7–23)
CALCIUM SERPL-MCNC: 8.5 MG/DL — SIGNIFICANT CHANGE UP (ref 8.4–10.5)
CHLORIDE SERPL-SCNC: 106 MMOL/L — SIGNIFICANT CHANGE UP (ref 98–107)
CO2 SERPL-SCNC: 21 MMOL/L — LOW (ref 22–31)
CREAT SERPL-MCNC: 0.82 MG/DL — SIGNIFICANT CHANGE UP (ref 0.5–1.3)
EGFR: 90 ML/MIN/1.73M2 — SIGNIFICANT CHANGE UP
GLUCOSE SERPL-MCNC: 107 MG/DL — HIGH (ref 70–99)
HCT VFR BLD CALC: 38.5 % — LOW (ref 39–50)
HGB BLD-MCNC: 12.3 G/DL — LOW (ref 13–17)
INR BLD: 1.06 RATIO — SIGNIFICANT CHANGE UP (ref 0.88–1.16)
MAGNESIUM SERPL-MCNC: 2 MG/DL — SIGNIFICANT CHANGE UP (ref 1.6–2.6)
MCHC RBC-ENTMCNC: 30.9 PG — SIGNIFICANT CHANGE UP (ref 27–34)
MCHC RBC-ENTMCNC: 31.9 GM/DL — LOW (ref 32–36)
MCV RBC AUTO: 96.7 FL — SIGNIFICANT CHANGE UP (ref 80–100)
NRBC # BLD: 0 /100 WBCS — SIGNIFICANT CHANGE UP (ref 0–0)
NRBC # FLD: 0 K/UL — SIGNIFICANT CHANGE UP (ref 0–0)
PHOSPHATE SERPL-MCNC: 3 MG/DL — SIGNIFICANT CHANGE UP (ref 2.5–4.5)
PLATELET # BLD AUTO: 253 K/UL — SIGNIFICANT CHANGE UP (ref 150–400)
POTASSIUM SERPL-MCNC: 4.3 MMOL/L — SIGNIFICANT CHANGE UP (ref 3.5–5.3)
POTASSIUM SERPL-SCNC: 4.3 MMOL/L — SIGNIFICANT CHANGE UP (ref 3.5–5.3)
PROTHROM AB SERPL-ACNC: 12.3 SEC — SIGNIFICANT CHANGE UP (ref 10.5–13.4)
RBC # BLD: 3.98 M/UL — LOW (ref 4.2–5.8)
RBC # FLD: 15.9 % — HIGH (ref 10.3–14.5)
SODIUM SERPL-SCNC: 136 MMOL/L — SIGNIFICANT CHANGE UP (ref 135–145)
WBC # BLD: 9.3 K/UL — SIGNIFICANT CHANGE UP (ref 3.8–10.5)
WBC # FLD AUTO: 9.3 K/UL — SIGNIFICANT CHANGE UP (ref 3.8–10.5)

## 2022-10-01 PROCEDURE — 71045 X-RAY EXAM CHEST 1 VIEW: CPT | Mod: 26

## 2022-10-01 PROCEDURE — 99233 SBSQ HOSP IP/OBS HIGH 50: CPT

## 2022-10-01 RX ORDER — CLOPIDOGREL BISULFATE 75 MG/1
1 TABLET, FILM COATED ORAL
Qty: 30 | Refills: 0
Start: 2022-10-01 | End: 2022-10-30

## 2022-10-01 RX ORDER — OXYCODONE HYDROCHLORIDE 5 MG/1
1 TABLET ORAL
Qty: 20 | Refills: 0
Start: 2022-10-01 | End: 2022-10-05

## 2022-10-01 RX ORDER — POLYETHYLENE GLYCOL 3350 17 G/17G
17 POWDER, FOR SOLUTION ORAL
Qty: 0 | Refills: 0 | DISCHARGE
Start: 2022-10-01

## 2022-10-01 RX ORDER — ROSUVASTATIN CALCIUM 5 MG/1
1 TABLET ORAL
Qty: 30 | Refills: 0
Start: 2022-10-01 | End: 2022-10-30

## 2022-10-01 RX ORDER — SENNA PLUS 8.6 MG/1
2 TABLET ORAL
Qty: 0 | Refills: 0 | DISCHARGE
Start: 2022-10-01

## 2022-10-01 RX ORDER — WARFARIN SODIUM 2.5 MG/1
1 TABLET ORAL
Qty: 0 | Refills: 0 | DISCHARGE

## 2022-10-01 RX ADMIN — LIDOCAINE 1 PATCH: 4 CREAM TOPICAL at 07:28

## 2022-10-01 RX ADMIN — LIDOCAINE 1 PATCH: 4 CREAM TOPICAL at 12:33

## 2022-10-01 RX ADMIN — Medication 12.5 MILLIGRAM(S): at 06:15

## 2022-10-01 RX ADMIN — Medication 81 MILLIGRAM(S): at 12:33

## 2022-10-01 RX ADMIN — SACUBITRIL AND VALSARTAN 1 TABLET(S): 24; 26 TABLET, FILM COATED ORAL at 06:16

## 2022-10-01 RX ADMIN — HEPARIN SODIUM 5000 UNIT(S): 5000 INJECTION INTRAVENOUS; SUBCUTANEOUS at 06:15

## 2022-10-01 RX ADMIN — Medication 40 MILLIGRAM(S): at 06:15

## 2022-10-01 RX ADMIN — CHLORHEXIDINE GLUCONATE 1 APPLICATION(S): 213 SOLUTION TOPICAL at 06:14

## 2022-10-01 RX ADMIN — Medication 150 MILLIGRAM(S): at 12:33

## 2022-10-01 NOTE — DISCHARGE NOTE NURSING/CASE MANAGEMENT/SOCIAL WORK - PATIENT PORTAL LINK FT
You can access the FollowMyHealth Patient Portal offered by [facility] by registering at the following website: [email or web address]. By joining [email or web address]’s FollowMyHealth portal, you will also be able to view your health information using other applications (apps) compatible with our system.

## 2022-10-01 NOTE — DISCHARGE NOTE PROVIDER - CARE PROVIDERS DIRECT ADDRESSES
[email or web address]

## 2022-10-01 NOTE — PROGRESS NOTE ADULT - SUBJECTIVE AND OBJECTIVE BOX
ELYSE MALAVE                     [id number]    HPI:  77 yr old male presents with hx of bipolar, BPH, SCC of leg, CHF EF 25% severe segmental LV systolic dysfunction, CAD, hospitalized on May 2022 for acute MI s/p cardiac Cath with no intervention (revealed  RCA and 70% occlusion prox LAD, 1st diagonal 70% stenosis- medical management-no intervention, IABP used for elevated right sided pressures; cardiogenic shock, found to have new 1x 1.3cm LV thrombus on Plavix and Coumadin (Coumadin completed on 08/26/2022, Plavix stopped around same time per pt), c/o one episode of difficulty walking, chest discomfort  and SOB while in the pool, CXR showed RUL nodule, PET/CT showed FDG avid RUL mass    PROCEDURES:     FB, R VATS, R Upper Lobectomy. 23-Sep-2022      ISSUES:       Cardiogenic shock   Acute postop respiratory failure   CAD, biventricular failure, h/o LV thrombus   Hyperkalemia improved   Lactic acidosis, acidemic improved   Encephalopathy, likely cerebral hypoperfusion in shock state, improving   BRODY likely ATN, recovering   Transaminitis, shock liver - improved   Lung nodule   Postop pain   Chest tube in place         PAST MEDICAL & SURGICAL HISTORY:  BPH (benign prostatic hyperplasia)      Bipolar disorder      Depression      Anxiety      Umbilical hernia, incarcerated      Inguinal hernia of right side without obstruction or gangrene      Depression      Constipation      Urinary retention      CAD (coronary artery disease)      SCC (squamous cell carcinoma)      Lung mass      Other nonspecific abnormal finding of lung field      LV (left ventricular) mural thrombus      History of inguinal hernia      Severe left ventricular systolic dysfunction      History of CHF (congestive heart failure)      History of arthroscopy of knee  Right, 1987      History of tonsillectomy  1952      History of hernia repair      H/O coronary angiogram                VITAL SIGNS:  Vital Signs Last 24 Hrs  T(C): 36.6 (01 Oct 2022 08:00), Max: 36.8 (30 Sep 2022 16:00)  T(F): 97.8 (01 Oct 2022 08:00), Max: 98.2 (30 Sep 2022 16:00)  HR: 101 (01 Oct 2022 08:00) (83 - 103)  BP: 100/65 (01 Oct 2022 08:00) (100/65 - 122/85)  BP(mean): 76 (01 Oct 2022 08:00) (75 - 91)  RR: 21 (01 Oct 2022 08:00) (12 - 21)  SpO2: 100% (01 Oct 2022 08:00) (91% - 100%)    Parameters below as of 01 Oct 2022 08:00  Patient On (Oxygen Delivery Method): room air        I/Os:   I&O's Detail    30 Sep 2022 07:01  -  01 Oct 2022 07:00  --------------------------------------------------------  IN:    Oral Fluid: 50 mL  Total IN: 50 mL    OUT:    Indwelling Catheter - Urethral (mL): 400 mL    Voided (mL): 800 mL  Total OUT: 1200 mL    Total NET: -1150 mL          CAPILLARY BLOOD GLUCOSE          =======================MEDICATIONS===================  MEDICATIONS  (STANDING):  acetaminophen   IVPB .. 1000 milliGRAM(s) IV Intermittent once  aspirin enteric coated 81 milliGRAM(s) Oral daily  chlorhexidine 4% Liquid 1 Application(s) Topical <User Schedule>  dornase delilah Solution 2.5 milliGRAM(s) Inhalation two times a day  furosemide    Tablet 40 milliGRAM(s) Oral daily  heparin   Injectable 5000 Unit(s) SubCutaneous every 8 hours  levalbuterol Inhalation 0.63 milliGRAM(s) Inhalation every 6 hours  lidocaine   4% Patch 1 Patch Transdermal daily  metoprolol succinate ER 12.5 milliGRAM(s) Oral every 12 hours  mirtazapine 7.5 milliGRAM(s) Oral at bedtime  pantoprazole  Injectable 40 milliGRAM(s) IV Push daily  polyethylene glycol 3350 17 Gram(s) Oral daily  rosuvastatin 20 milliGRAM(s) Oral at bedtime  sacubitril 24 mG/valsartan 26 mG 1 Tablet(s) Oral two times a day  senna 2 Tablet(s) Oral at bedtime  sodium chloride 3%  Inhalation 4 milliLiter(s) Inhalation every 6 hours  tamsulosin 0.4 milliGRAM(s) Oral at bedtime  venlafaxine XR. 150 milliGRAM(s) Oral daily    MEDICATIONS  (PRN):  acetaminophen     Tablet .. 650 milliGRAM(s) Oral every 6 hours PRN Mild Pain (1 - 3)  HYDROmorphone  Injectable 0.25 milliGRAM(s) IV Push every 6 hours PRN Severe Pain (7 - 10)  melatonin 3 milliGRAM(s) Oral at bedtime PRN Insomnia  oxyCODONE    IR 5 milliGRAM(s) Oral every 6 hours PRN Moderate Pain (4 - 6)  polyethylene glycol 3350 17 Gram(s) Oral daily PRN Constipation  zolpidem 5 milliGRAM(s) Oral at bedtime PRN Insomnia      PHYSICAL EXAM============================  General:                         Awake, alert, not in any distress  Neuro:                            Moving all extremities to commands.   Respiratory:	Air entry fair and  bilateral conducted sounds                                           Effort even and unlabored.  CV:		Regular rate and rhythm. Normal S1/S2                                          Distal pulses present.  Abdomen:	                     Soft, non-distended. Bowel sounds present   Skin:		No rash.  Extremities:	Warm, no cyanosis or edema.  Palpable pulses    ============================LABS=========================                        12.3   9.30  )-----------( 253      ( 01 Oct 2022 04:20 )             38.5     10-01    136  |  106  |  21  ----------------------------<  107<H>  4.3   |  21<L>  |  0.82    Ca    8.5      01 Oct 2022 04:20  Phos  3.0     10-01  Mg     2.00     10-01        PT/INR - ( 01 Oct 2022 04:20 )   PT: 12.3 sec;   INR: 1.06 ratio         PTT - ( 01 Oct 2022 04:20 )  PTT:40.3 sec    PROCEDURE: Transthoracic echocardiogram with 2-D, M-Mode  and complete spectral and color flow Doppler.  INDICATION: ST elevation (STEMI) myocardial infarction of  unspecified site (I21.3)  ------------------------------------------------------------------------  DIMENSIONS:  Dimensions:     Normal Values:  LA:     4.1 cm    2.0 - 4.0 cm  Ao:     3.7 cm    2.0 - 3.8 cm  SEPTUM: 1.2 cm    0.6 - 1.2 cm  PWT:    1.2 cm    0.6 - 1.1 cm  LVIDd:  6.1 cm    3.0 - 5.6 cm  LVIDs:    ---     1.8 - 4.0 cm  Derived Variables:  LVMI: 161 g/m2  RWT: 0.39  Ejection Fraction (Modified Pizarro Rule): 34 %  ------------------------------------------------------------------------  OBSERVATIONS:  Mitral Valve: Mitral annular calcification, otherwise  normal mitral valve. Moderate mitral regurgitation.  Aortic Root: Normal aortic root.  Aortic Valve: Calcified trileaflet aortic valve with normal  opening. Mild aortic regurgitation.  Left Atrium: Normal left atrium.  LA volume index = 28  cc/m2.  Left Ventricle: Severe global left ventricular systolic  dysfunction. Severe left ventricular enlargement. Moderate  diastolic dysfunction (Stage II).  Right Heart: Normal right atrium. Right ventricular  enlargement with normal right ventricular systolic  function. Normal tricuspid valve.    Moderate tricuspid  regurgitation. Normal pulmonic valve. Mild pulmonic  regurgitation.  Pericardium/PleuraNormal pericardium with no pericardial  effusion.  Hemodynamic: Estimated right ventricular systolic pressure  equals 82 mm Hg, assuming right atrial pressure equals 10  mm Hg, consistent with severe pulmonary hypertension.  ------------------------------------------------------------------------  CONCLUSIONS:  1. Mitral annular calcification, otherwise normal mitral  valve. Moderate mitral regurgitation.  2. Severe left ventricular enlargement.  3. Severe global left ventricularsystolic dysfunction.  4. Moderate diastolic dysfunction (Stage II).  5. Right ventricular enlargement with normal right  ventricular systolic function.  6. Estimated pulmonary artery systolic pressure equals 82  mm Hg, assuming right atrial pressureequals 10  mm Hg,  consistent with severe pulmonary hypertension.        A/P:  77yMale s/p FB, R VATS, R Upper Lobectomy. 23-Sep-2022, experiencing  pain with deep breathing.                             Neuro:                                         Pain control with  Dilaudid /  Tylenol / Oxy  PRN    Bipolar disorder / Anxiety / Depression: Continue mirtazapine                             Cardiovascular:      Cardiogenic shock: On Dobutamine support, SCVO2 56-67%, Monitor SCVO2, CVP  Clinically pt is not showing signs of decompensation  2D Echo on 9/24:  Severe global left ventricular systolic dysfunction, Moderate diastolic dysfunction (Stage II), Right ventricular enlargement with normal right ventricular systolic function  Continue gentle diuresis and monitor renal function  Cardiology f/u. CHF team consulted                                           CAD: Continue ASA 81mg. Hold BB  HLD: Lipitor on hold because of shock liver. Trend LFTs.  Telemetry (medical test) - Reviewed by me today independently. Normal sinus rhythm /  with some PVCs .                                          Continue hemodynamic monitoring to prevent decompensation.                            Respiratory:  RA, NAD                                                                                  Encourage incentive spirometry.                                                   Chest PT and frequent suctioning. Continue bronchodilators, Pulmozyme and inhaled 3% saline inhalations.                                                      OOB to chair & ambulate w/ assistance.                                                           Continuous pulse oximetry for support & to prevent decompensation.                                                                                                                              GI                                         On DASH  diet as tolerated                                         Continue Zofran / Reglan for nausea - PRN                                         Continue bowel regimen     Shock liver: LFTs are trending down	                                                                 Renal:                                                                                  Monitor I/Os and electrolytes     Continue gentle diuresis with Lasix, add aldactone for CHF     BPH: Continue Flomax                                                                                        Hem/ Onc:                                         DVT prophylaxis with SQ Heparin and SCDs                                         Monitor chest tube output &  signs of bleeding.                                          Follow CBC in AM                           Infectious disease:                                            Monitor for fever / leukocytosis.                                          All surgical incision / chest tube  sites look clean                            Endocrine                                             Continue Accu-Checks with coverage                                                 Pertinent clinical, laboratory, radiographic, hemodynamic, echocardiographic, respiratory data, microbiologic data and chart were reviewed and analyzed frequently throughout the course of the day and night.     Patient seen, examined and plan discussed with CT Surgeon Dr. Padilla / CTICU team during rounds.     Status discussed with patient and updated plan of care.       Kerrie Schroeder DO FACEP

## 2022-10-01 NOTE — DISCHARGE NOTE PROVIDER - NSDCFUSCHEDAPPT_GEN_ALL_CORE_FT
Jay Shell  [facility] Physician [facility] [address]  Scheduled Appointment: 10/13/2022

## 2022-10-01 NOTE — DISCHARGE NOTE PROVIDER - DETAILS OF MALNUTRITION DIAGNOSIS/DIAGNOSES
This patient has been assessed with a concern for Malnutrition and was treated during this hospitalization for the following Nutrition diagnosis/diagnoses:     -  09/27/2022: Severe protein-calorie malnutrition

## 2022-10-01 NOTE — DISCHARGE NOTE PROVIDER - HOSPITAL COURSE
77 yr old male presents with hx of bipolar, BPH, SCC of leg, CHF EF 25% severe segmental LV systolic dysfunction, CAD, hospitalized on May 2022 for acute MI s/p cardiac Cath with no intervention (revealed  RCA and 70% occlusion prox LAD, 1st diagonal 70% stenosis- medical management-no intervention, IABP used for elevated right sided pressures; cardiogenic shock, found to have new 1x 1.3cm LV thrombus on Plavix and Coumadin (Coumadin completed on 08/26/2022, Plavix stopped around same time per pt), c/o one episode of difficulty walking, chest discomfort  and SOB while in the pool, CXR showed RUL nodule, PET/CT showed FDG avid RUL mass    Now s/p FB, R VATS, R Upper Lobectomy on 9/23/22. Intra-op patient hypotensive and requiring pressor support. Post operatively on inotropes which were weaned off and patient is stable. Heart failure team consulting on the patient. Started patient on entresto and continue lasix. Plavix to be restarted once tubes are out. Chest tubes removed and CXR stable. Will have o/p follow up with Heart failure for continued follow up and care management.     Vital Signs Last 24 Hrs  T(C): 36.4 (01 Oct 2022 04:00), Max: 36.8 (30 Sep 2022 16:00)  T(F): 97.5 (01 Oct 2022 04:00), Max: 98.2 (30 Sep 2022 16:00)  HR: 83 (01 Oct 2022 05:00) (83 - 103)  BP: 100/66 (01 Oct 2022 05:00) (100/66 - 122/85)  BP(mean): 78 (01 Oct 2022 05:00) (75 - 91)  RR: 13 (01 Oct 2022 05:00) (12 - 21)  SpO2: 99% (01 Oct 2022 05:00) (91% - 100%)    Parameters below as of 30 Sep 2022 22:57  Patient On (Oxygen Delivery Method): room air

## 2022-10-01 NOTE — DISCHARGE NOTE PROVIDER - NSDCFUADDINST_GEN_ALL_CORE_FT
Follow up with Dr. Padilla in 12-14 days. Call Thoracic Surgery office [phone] to schedule an appointment. Please, obtain a Chest Xray 1-2 days prior to your appointment and bring a copy with you.  Blue Stitch will be removed by Dr. Padilla in the office.  You may shower in 24 hours with dressing and remove in the shower. Keep the wound clean and dry it well after showering.  It’s OK if some fluid comes out of the chest tube hole unless blood or purulent.  No Driving while taking pain meds. Some coughing and discomfort is expected.

## 2022-10-01 NOTE — PROGRESS NOTE ADULT - PROVIDER SPECIALTY LIST ADULT
Critical Care
Nephrology
Nephrology
Cardiology
Critical Care
Heart Failure
Nephrology
Critical Care
Nephrology
Nephrology

## 2022-10-01 NOTE — PROGRESS NOTE ADULT - NUTRITIONAL ASSESSMENT
This patient has been assessed with a concern for Malnutrition and has been determined to have a diagnosis/diagnoses of Severe protein-calorie malnutrition.    This patient is being managed with:   Diet DASH/TLC-  Sodium & Cholesterol Restricted  Entered: Sep 24 2022  9:42AM    
This patient has been assessed with a concern for Malnutrition and has been determined to have a diagnosis/diagnoses of Severe protein-calorie malnutrition.    This patient is being managed with:   Diet DASH/TLC-  Sodium & Cholesterol Restricted  Entered: Sep 24 2022  9:42AM      This patient has been assessed with a concern for Malnutrition and has been determined to have a diagnosis/diagnoses of Severe protein-calorie malnutrition.    This patient is being managed with:   Diet DASH/TLC-  Sodium & Cholesterol Restricted  Entered: Sep 24 2022  9:42AM    
This patient has been assessed with a concern for Malnutrition and has been determined to have a diagnosis/diagnoses of Severe protein-calorie malnutrition.    This patient is being managed with:   Diet DASH/TLC-  Sodium & Cholesterol Restricted  Entered: Sep 24 2022  9:42AM

## 2022-10-01 NOTE — DISCHARGE NOTE NURSING/CASE MANAGEMENT/SOCIAL WORK - NSDPLANG ASIS_GEN_ALL_CORE
81y/o female with history of HTN , ESRD presents with hip pain s/p fall, found to have hip fracture. s/p L Hemiarthroplasty 02/12.     ESRD on HD ( MWF) via [facility]  Outpt unit [facility]   Consent obtained for HD   s/p L Hemiarthroplasty 02/12  Continue MWF schedule at present  Monitor BMP and BP  [facility]    Hyperkalemia  Monitor K  Low k diet    Anemia  Does not tolerate Epogen/ aranesp  On Mircera 100mg Bi Monthly  Last dose on 2/7   PRBC as needed to keep Hb >7.5    HTN  Bp  fluctuating  Monitor BP    CKD -BMD  PTH elevatd--hectoral with HD  Monitor serum calcium and PO4 daily  No

## 2022-10-01 NOTE — DISCHARGE NOTE NURSING/CASE MANAGEMENT/SOCIAL WORK - NSDCVIVACCINE_GEN_ALL_CORE_FT
COVID-19, mRNA, LNP-S, PF, 100 mcg/ 0.5 mL dose (Moderna); 17-May-2022 14:07; Pankaj Velazco (RN); [employer] [id number] (Exp. Date: 05-Jun-2022); IntraMuscular; Deltoid Left.; 0.25 milliLiter(s);

## 2022-10-01 NOTE — DISCHARGE NOTE PROVIDER - NSDCMRMEDTOKEN_GEN_ALL_CORE_FT
acetaminophen 500 mg oral tablet: 2 tab(s) orally every 6 hours, As Needed  aspirin 81 mg oral capsule: 1 cap(s) orally once a day   Chest Xray PA &amp; Lateral : Please send results to Dr Padilla [phone]  ICD code: J 93.9  Effexor  mg oral capsule, extended release: 1 cap(s) orally once a day   Flomax 0.4 mg oral capsule: 1 cap(s) orally once a day  Lasix 40 mg oral tablet: 1 tab(s) orally 2 times a day   Lipitor 80 mg oral tablet: 1 tab(s) orally once a day (at bedtime)  METOPROLOL SUCC ER 25 MG TAB: 1 tab(s) orally once a day  mirtazapine 7.5 mg oral tablet: 1 tab(s) orally once a day (at bedtime)  oxyCODONE 5 mg oral tablet: 1 tab(s) orally every 6 hours, As needed, Moderate - Severe Pain (4 - 10) MDD:20mg  polyethylene glycol 3350 oral powder for reconstitution: 17 gram(s) orally once a day  sacubitril-valsartan 24 mg-26 mg oral tablet: 1 tab(s) orally 2 times a day  senna leaf extract oral tablet: 2 tab(s) orally once a day (at bedtime)   acetaminophen 500 mg oral tablet: 2 tab(s) orally every 6 hours, As Needed  aspirin 81 mg oral capsule: 1 cap(s) orally once a day   Chest Xray PA &amp; Lateral : Please send results to Dr Padilla [phone]  ICD code: J 93.9  Effexor  mg oral capsule, extended release: 1 cap(s) orally once a day   Flomax 0.4 mg oral capsule: 1 cap(s) orally once a day  Lasix 40 mg oral tablet: 1 tab(s) orally 2 times a day   Lipitor 80 mg oral tablet: 1 tab(s) orally once a day (at bedtime)  METOPROLOL SUCC ER 25 MG TAB: 1 tab(s) orally once a day  mirtazapine 7.5 mg oral tablet: 1 tab(s) orally once a day (at bedtime)  oxyCODONE 5 mg oral tablet: 1 tab(s) orally every 6 hours, As needed, Moderate - Severe Pain (4 - 10) MDD:20mg  Plavix 75 mg oral tablet: 1 tab(s) orally once a day   polyethylene glycol 3350 oral powder for reconstitution: 17 gram(s) orally once a day  sacubitril-valsartan 24 mg-26 mg oral tablet: 1 tab(s) orally 2 times a day  senna leaf extract oral tablet: 2 tab(s) orally once a day (at bedtime)

## 2022-10-01 NOTE — DISCHARGE NOTE PROVIDER - CARE PROVIDER_API CALL
Stevie Padilla (MD)  Surgery; Thoracic Surgery  [address] [place] [address], Oncology [place] [address]  Phone: [phone]  Fax: [phone]  Follow Up Time: 2 weeks    Jay Shell; PhD)  Adv Heart Fail Trnsplnt Cardio; Cardiology  [address] [place] [id number]  Phone: [phone]  Fax: [phone]  Scheduled Appointment: 10/13/2022 09:00 AM

## 2022-10-01 NOTE — DISCHARGE NOTE NURSING/CASE MANAGEMENT/SOCIAL WORK - NSDCPEFALRISK_GEN_ALL_CORE
For information on Fall & Injury Prevention, visit: [email or web address] OR  [email or web address] OR  https://www.cdc.gov/steadi/patient.html

## 2022-10-01 NOTE — DISCHARGE NOTE PROVIDER - NSDCCPCAREPLAN_GEN_ALL_CORE_FT
PRINCIPAL DISCHARGE DIAGNOSIS  Diagnosis: Lung mass  Assessment and Plan of Treatment:       SECONDARY DISCHARGE DIAGNOSES  Diagnosis: Acute on chronic systolic congestive heart failure  Assessment and Plan of Treatment:

## 2022-10-01 NOTE — DISCHARGE NOTE PROVIDER - PROVIDER TOKENS
PROVIDER:[TOKEN:[[id number]],FOLLOWUP:[2 weeks]],PROVIDER:[TOKEN:[[id number]],SCHEDULEDAPPT:[10/13/2022],SCHEDULEDAPPTTIME:[09:00 AM]]

## 2022-10-13 ENCOUNTER — APPOINTMENT (OUTPATIENT)
Dept: HEART FAILURE | Facility: CLINIC | Age: 77
End: 2022-10-13

## 2022-10-13 ENCOUNTER — NON-APPOINTMENT (OUTPATIENT)
Age: 77
End: 2022-10-13

## 2022-10-13 VITALS
SYSTOLIC BLOOD PRESSURE: 113 MMHG | BODY MASS INDEX: 23.57 KG/M2 | WEIGHT: 174 LBS | HEIGHT: 72 IN | DIASTOLIC BLOOD PRESSURE: 75 MMHG | OXYGEN SATURATION: 99 % | HEART RATE: 98 BPM

## 2022-10-13 PROCEDURE — 99214 OFFICE O/P EST MOD 30 MIN: CPT

## 2022-10-13 PROCEDURE — 93000 ELECTROCARDIOGRAM COMPLETE: CPT

## 2022-10-13 PROCEDURE — 36415 COLL VENOUS BLD VENIPUNCTURE: CPT

## 2022-10-13 RX ORDER — CHLORHEXIDINE GLUCONATE, 0.12% ORAL RINSE 1.2 MG/ML
0.12 SOLUTION DENTAL
Qty: 473 | Refills: 0 | Status: DISCONTINUED | COMMUNITY
Start: 2017-11-22 | End: 2022-10-13

## 2022-10-13 RX ORDER — MIRTAZAPINE 7.5 MG/1
7.5 TABLET, FILM COATED ORAL
Qty: 30 | Refills: 0 | Status: DISCONTINUED | COMMUNITY
Start: 2017-11-20 | End: 2022-10-13

## 2022-10-13 RX ORDER — WHEAT DEXTRIN 3 G/4 G
POWDER (GRAM) ORAL
Qty: 1 | Refills: 0 | Status: DISCONTINUED | OUTPATIENT
Start: 2021-06-04 | End: 2022-10-13

## 2022-10-13 RX ORDER — ATORVASTATIN CALCIUM 80 MG/1
80 TABLET, FILM COATED ORAL
Qty: 90 | Refills: 3 | Status: DISCONTINUED | COMMUNITY
Start: 2022-06-21 | End: 2022-10-13

## 2022-10-13 RX ORDER — TAMSULOSIN HYDROCHLORIDE 0.4 MG/1
0.4 CAPSULE ORAL
Refills: 0 | Status: DISCONTINUED | COMMUNITY
Start: 2017-01-12 | End: 2022-10-13

## 2022-10-13 RX ORDER — MIRTAZAPINE 15 MG/1
15 TABLET, FILM COATED ORAL
Refills: 0 | Status: DISCONTINUED | COMMUNITY
End: 2022-10-13

## 2022-10-14 ENCOUNTER — NON-APPOINTMENT (OUTPATIENT)
Age: 77
End: 2022-10-14

## 2022-10-14 LAB
ALBUMIN SERPL ELPH-MCNC: 3.3 G/DL
ALP BLD-CCNC: 203 U/L
ALT SERPL-CCNC: 54 U/L
ANION GAP SERPL CALC-SCNC: 15 MMOL/L
AST SERPL-CCNC: 35 U/L
BASOPHILS # BLD AUTO: 0.07 K/UL
BASOPHILS NFR BLD AUTO: 0.8 %
BILIRUB SERPL-MCNC: 1 MG/DL
BUN SERPL-MCNC: 24 MG/DL
CALCIUM SERPL-MCNC: 9 MG/DL
CHLORIDE SERPL-SCNC: 102 MMOL/L
CO2 SERPL-SCNC: 21 MMOL/L
CREAT SERPL-MCNC: 1 MG/DL
EGFR: 78 ML/MIN/1.73M2
EOSINOPHIL # BLD AUTO: 0.96 K/UL
EOSINOPHIL NFR BLD AUTO: 10.8 %
ESTIMATED AVERAGE GLUCOSE: 131 MG/DL
HBA1C MFR BLD HPLC: 6.2 %
HCT VFR BLD CALC: 36.4 %
HGB BLD-MCNC: 12.1 G/DL
IMM GRANULOCYTES NFR BLD AUTO: 0.3 %
LYMPHOCYTES # BLD AUTO: 1.21 K/UL
LYMPHOCYTES NFR BLD AUTO: 13.6 %
MAN DIFF?: NORMAL
MCHC RBC-ENTMCNC: 30.5 PG
MCHC RBC-ENTMCNC: 33.2 GM/DL
MCV RBC AUTO: 91.7 FL
MONOCYTES # BLD AUTO: 1.3 K/UL
MONOCYTES NFR BLD AUTO: 14.6 %
NEUTROPHILS # BLD AUTO: 5.34 K/UL
NEUTROPHILS NFR BLD AUTO: 59.9 %
NT-PROBNP SERPL-MCNC: 3080 PG/ML
PLATELET # BLD AUTO: 434 K/UL
POTASSIUM SERPL-SCNC: 3.8 MMOL/L
PROT SERPL-MCNC: 7.2 G/DL
RBC # BLD: 3.97 M/UL
RBC # FLD: 15.8 %
SODIUM SERPL-SCNC: 137 MMOL/L
TSH SERPL-ACNC: 2.43 UIU/ML
WBC # FLD AUTO: 8.91 K/UL

## 2022-10-17 ENCOUNTER — OUTPATIENT (OUTPATIENT)
Dept: OUTPATIENT SERVICES | Facility: HOSPITAL | Age: 77
LOS: 1 days | End: 2022-10-17

## 2022-10-17 ENCOUNTER — APPOINTMENT (OUTPATIENT)
Dept: RADIOLOGY | Facility: HOSPITAL | Age: 77
End: 2022-10-17

## 2022-10-17 ENCOUNTER — APPOINTMENT (OUTPATIENT)
Dept: THORACIC SURGERY | Facility: CLINIC | Age: 77
End: 2022-10-17

## 2022-10-17 ENCOUNTER — RESULT REVIEW (OUTPATIENT)
Age: 77
End: 2022-10-17

## 2022-10-17 VITALS
BODY MASS INDEX: 23.43 KG/M2 | OXYGEN SATURATION: 95 % | SYSTOLIC BLOOD PRESSURE: 136 MMHG | WEIGHT: 173 LBS | HEIGHT: 72 IN | DIASTOLIC BLOOD PRESSURE: 67 MMHG | HEART RATE: 107 BPM

## 2022-10-17 DIAGNOSIS — C34.91 MALIGNANT NEOPLASM OF UNSPECIFIED PART OF RIGHT BRONCHUS OR LUNG: ICD-10-CM

## 2022-10-17 DIAGNOSIS — Z98.890 OTHER SPECIFIED POSTPROCEDURAL STATES: Chronic | ICD-10-CM

## 2022-10-17 PROCEDURE — 71046 X-RAY EXAM CHEST 2 VIEWS: CPT | Mod: 26

## 2022-10-17 PROCEDURE — 99024 POSTOP FOLLOW-UP VISIT: CPT

## 2022-10-17 NOTE — CARDIOLOGY SUMMARY
[de-identified] : 10/13/22 NSR 98, LAFB, PRWP, NSST with PVC's [de-identified] : The left ventricle was normal in size. The mid to distal\par anterior, mid to distal anteroseptal, apical, and inferior\par walls do not demonstrate significant viability. The\par septum demonstrates mild viability. The remaining segments\par are viable (predominantly the lateral wall only). [de-identified] : 9/2022 RHC: RA 15, PCWP 31, PA sat 59%, CO 3.63, CI 1.70, PVR 2.76.\par 9/2022:  s/p R/LHC with IABP support which showed  mid %, LAD 70%, D1 70% and RA 15,\par PCWP 31, PA sat 59%, CO 3.63, CI 1.70, PVR 2.76; medical management

## 2022-10-17 NOTE — DISCUSSION/SUMMARY
[EKG obtained to assist in diagnosis and management of assessed problem(s)] : EKG obtained to assist in diagnosis and management of assessed problem(s) [FreeTextEntry1] : 1. S/P  Acute on chronic systolic congestive heart failure.\par - Improving volume status\par - decrease Lasix 40mg to daily from twice a day\par - increase Entresto to 49-51 mg bid from 24/26mg twice daily (hold for SBP <90)\par - continue Toprol XL 25 mg daily and will increase to 25 mg bid by next week\par - will consider adding MRA brenda 25 mg daily\par -Daily  weight and B/P log, pt given HF booklet and digital scale\par - B/P monitor ordered to pharmacy\par - labs done today with K 3.8, BUN/creat 1.00 and decrease in serum pro BNP to 3080 from 3500\par - limit salt to less than 2000 mg per day\par - limit fluid to less than 2 liters per day\par - will repeat TTE on GDMT and if LVEF < 35%, will refer for ICD\par \par 2. CAD

## 2022-10-17 NOTE — PHYSICAL EXAM
[Well Nourished] : well nourished [No Acute Distress] : no acute distress [Normal Conjunctiva] : normal conjunctiva [Normal S1, S2] : normal S1, S2 [Soft] : abdomen soft [Non Tender] : non-tender [Normal Gait] : normal gait [No Edema] : no edema [Normal] : alert and oriented, normal memory [Alert and Oriented] : alert and oriented [de-identified] : JVP 8 cm  [de-identified] : RRR 90's [de-identified] : decreased right base with mild crackles, dry cough

## 2022-10-17 NOTE — ASSESSMENT
[FreeTextEntry1] : 77 year old Male with PMH of bipolar disorder, CAD/STEMI (5/2022) s/p R/LHC with IABP support which showed  mid %, LAD 70%, D1 70% and RA 15, PCWP 31, PA sat 59%, CO 3.63, CI 1.70, PVR 2.76; medical management recommended; TTE showed LV thrombus treated with Coumadin, HFrEF (EF 34%, LVIDd\par 6.1, moderate TR and severe PH), RUL mass s/p R. VATS (9/23/2022) c/b cardiogenic shock requiring inotropic support and diuretics. \par Pertinent labs:\par AST 2768, ALT 1653 RHC: RA 15, PCWP 31, PA sat 59%, CO 3.63, CI 1.70, PVR\par 2.7.  VSat = 53.9% CO/CI 3.2/1.5 BNP decreased to 3,514 AST/ALT decreased to 351/652\par ACC/AHA Stage, NYHA Class II symptoms\par Appears compensated and normotensive but with ongoing dry cough\par \par \par \par Problem/Plan - 1:\par - Problem: Acute on chronic systolic congestive heart failure.\par \par

## 2022-10-17 NOTE — HISTORY OF PRESENT ILLNESS
[FreeTextEntry1] : 77 year old Male with PMH of bipolar disorder, CAD/STEMI (5/2022) s/p R/LHC with IABP support which showed  mid %, LAD 70%, D1 70% and RA 15, PCWP 31, PA sat 59%, CO 3.63, CI 1.70, PVR 2.76; medical management recommended; TTE showed LV thrombus treated with Coumadin (completed 8/26/22) , HFrEF (EF 34%, LVIDd6.1, moderate TR and severe PH), RUL mass s/p R. VATS (9/23/2022) c/b cardiogenic shock requiring inotropic support and diuretics. Pertinent labs:AST 2768, ALT 1653 RHC: RA 15, PCWP 31, PA sat 59%, CO 3.63, CI 1.70, PVR.2.7. Pt is here for a post hospital follow up to establish care in the HF clinic. Accompanied by his wife. Card:Dr. Walden.\par \par Pt states he has been feeling better since d/c but has had an ongoing dry cough. He had a CXR before d/c with right pleural effusion.  States D/c weight was approx 174 lbs and he is 174 lbs at home and 178 lbs in office with clothes with B/P 113/75, HR 98.\par \par He was d/c on atorvastatin 80 mg daily but had generalized muscle aches that improved after changing to Crestor.\par He is taking Entresto 24-26 mg bid ( had previously been on  mg during summer), ASA, plavix, metoprolol 25 mg daily, flomax and is taking furosemide 40 mg bid, not daily. He had a viability study with reported left ventricle was normal in size. The mid to distal anterior, mid to distal anteroseptal, apical, and inferior walls do not demonstrate significant viability. The septum demonstrates mild viability. The remaining segments are viable (predominantly the lateral wall only).\par \par He is adhering to a low salt diet and is drinking less than 2 liters of fluid per day. \par \par States he can walk for 20 minutes and can climb 1-2 flights of stairs without dyspnea. He sleeps with 1 pillow with no orthopnea. He denies chest pain, palpitations, dizziness/LH and syncope and he does not have an ICD. \par Of note, he is  , non smoker, no ETOH, + family history of CAD in brothers; one with ICD and 3 V CABG, other with CHF, stents\par \par \par \par Echocardiogram:\par Transthoracic Echocardiogram (09.24.22 @ 14:33)\par DIMENSIONS:\par Dimensions: Normal Values:\par LA: 4.1 cm 2.0 - 4.0 cm\par Ao: 3.7 cm 2.0 - 3.8 cm\par SEPTUM: 1.2 cm 0.6 - 1.2 cm\par PWT: 1.2 cm 0.6 - 1.1 cm\par LVIDd: 6.1 cm 3.0 - 5.6 cm\par LVIDs: --- 1.8 - 4.0 cm\par Derived Variables:\par LVMI: 161 g/m2\par RWT: 0.39\par Ejection Fraction (Modified Pizarro Rule): 34 %\par ------------------------------------------------------------------------\par OBSERVATIONS:\par Mitral Valve: Mitral annular calcification, otherwise\par normal mitral valve. Moderate mitral regurgitation.\par Aortic Root: Normal aortic root.\par Aortic Valve: Calcified trileaflet aortic valve with normal\par opening. Mild aortic regurgitation.\par Left Atrium: Normal left atrium. LA volume index = 28\par cc/m2.\par Left Ventricle: Severe global left ventricular systolic\par dysfunction. Severe left ventricular enlargement. Moderate\par diastolic dysfunction (Stage II).\par Right Heart: Normal right atrium. Right ventricular\par enlargement with normal right ventricular systolic\par function. Normal tricuspid valve. Moderate tricuspid\par regurgitation. Normal pulmonic valve. Mild pulmonic\par regurgitation.\par Pericardium/PleuraNormal pericardium with no pericardial\par effusion.\par Hemodynamic: Estimated right ventricular systolic pressure\par equals 82 mm Hg, assuming right atrial pressure equals 10\par mm Hg, consistent with severe pulmonary hypertension.\par ------------------------------------------------------------------------\par CONCLUSIONS:\par 1. Mitral annular calcification, otherwise normal mitral\par valve. Moderate mitral regurgitation.\par 2. Severe left ventricular enlargement.\par 3. Severe global left ventricularsystolic dysfunction.\par 4. Moderate diastolic dysfunction (Stage II).\par 5. Right ventricular enlargement with normal right\par ventricular systolic function.\par 6. Estimated pulmonary artery systolic pressure equals 82\par mm Hg, assuming right atrial pressureequals 10 mm Hg,\par consistent with severe pulmonary hypertension.\par \par  Cardiac Catheterization (05.11.22 @ 15:16) >\par Intra-aortic Balloon Pump\par An intra-aortic balloon pump was inserted.\par Diagnostic Findings:\par Coronary Angiography\par The coronary circulation is right dominant.\par LM\par Left main artery: Angiography shows no disease.\par LAD\par Proximal left anterior descending: There is a 70 % stenosis. First\par diagonal: There is a 70 % stenosis.\par Patient: ELYSE MALAVE MRN: [id number]\par Study Date: 05/11/2022 03:16 PM Page 1 of 4\par CX\par Circumflex: Angiography shows no disease.\par RCA\par Mid right coronary artery: There is a 100 % stenosis.\par Exam Start Time: 03:16 PM\par Exam End Time: 03:53 PM\par Exam Duration: 37 min\par Patient: ELYSE MALAVE MRN: [id number]\par Study Date: 05/11/2022 03:16 PM Page 2 of 4\par Hemodynamic Pressures:\par Phase Location [mmHg] [mmHg]\par [mmHg] HR\par Baseline LV s 115\par ed 36 88\par Baseline Ao s 126 d 85\par m 102 103\par Baseline RV s 50\par ed 17 81\par Baseline PA s 56 d 31\par m 41 81\par Baseline PCW a 32 v 34\par m 31 77\par Baseline RA a 20 v 16\par m 15 95\par \par Oxygen Saturations\par Phase Location Hb Sat pO2\par Content Group\par Baseline AO 14 99\par 19.33 SA\par Baseline PA 14 59\par 11.48 PA\par \par Oxygen Saturation Average, Phase: Baseline\par PV Hb PV Sat PV pO2\par PV Content\par SA Hb 14.40 g/dL SA Sat 98.70 % SA pO2\par SA Content 19.33 %\par PA Hb 14.40 g/dL PA Sat 58.60 % PA pO2\par PA Content 11.48 %\par MV Hb MV Sat MV pO2\par MV Content\par Strokework:\par Phase: Baseline\par LVSW: 34.02 g*m LVSW (index):\par 15.96 g*m/m2\par RVSW: 15.84 g*m RVSW (index):\par 7.43 g*m/m2\par Flow Calculations, Phase: Baseline\par CO 3.63 l/min HR\par O2Insp\par CI 1.70 l/min/m2 PO2\par O2Exp\par SV VO2 285.00 ml/min\par O2(ExAir)\par SVI A-VO2\par Hb 14.40 gm/d\par Shunts:\par Qs 3.63 l/min Qs Index 1.70\par l/min/m2\par Qp 3.63 l/min Qp Index 1.70\par l/min/m2 Qp/Qs\par EPBF EPBF Index\par L-R L-R Index\par R-L R-L Index\par Systemic Resistances, Phase: Baseline\par SVR 1917.35 dyn*s/cm5 TVR\par 2247.93 dyn*s/cm5\par \par Patient: ELYSE MALAVE MRN: [id number]\par Study Date: 05/11/2022 03:16PM Page 3 of 4\par \par \par \par \par SVRI 4086.28 dyn*s*m2/cm5 TVRI 4790.81 dyn*s*m2/cm5\par SVR 23.97 MERCADO TVR\par 28.11 MERCADO\par SVRI 51.09 MERCADO*m2 TVRI\par 59.90 MERCADO*m2\par Pulmonary Resistances:\par .39 dyn*s/cm5 TPR\par 903.58 dyn*s/cm5\par PVRI 469.69 dyn*s*m2/cm5 TPRI\par 1925.72 dyn*s*m2/cm5\par PVR 2.76 MERCADO TPR\par 11.30 MERCADO\par PVRI 5.87 MERCADO*m2 TPRI\par 24.08 MERCADO*m2\par Ratios:\par PVR-SVR 0.11 TPR-TVR\par 0.4\par Shunts:\par venO2 CO\par R-L Shunt R-L Shunt\par Sammy. Method\par L-R Shunt L-R Shunt\par R-L Shunt R-L Shunt\par Sammy. Method\par \par Conscious sedation was administered and monitored by Cardiology\par nursing staff,\par under my direct supervision when applicable.\par \par Electronically signed by Stew Helms MD on 05/11/2022 at 05:17 PM\par \par \par \par \par Cardiac Viability (05.12.22 @ 15:17)\par NUCLEAR FINDINGS:\par The left ventricle was normal in size. The mid to distal\par anterior, mid to distalanteroseptal, apical, and inferior\par walls do not demonstrate significant viability. The\par septum demonstrates mild viability. The remaining segments\par are viable (predominantly the lateral wall only).\par ------------------------------------------------------------------------\par GATED ANALYSIS:\par Rest gated wall motion analysis was performed (LVEF = 29\par %;LVEDV = 205 ml.), revealing markedly reduced LV\par function with regional variation.\par ------------------------------------------------------------------------\par IMPRESSIONS:Abnormal Study\par * The left ventricle was normal in size. The mid to distal\par anterior, mid to distal anteroseptal, apical, and inferior\par walls do not demonstrate significant viability. The\par septum demonstrates mild viability. Theremaining segments\par are viable (predominantly the lateral wall only).\par * Rest gated wall motion analysis was performed (LVEF = 29\par %;LVEDV = 205 ml.), revealing markedly reduced LV\par function with regional variation.\par Conclusion:\par The left ventricle was normal in size. The mid to distal\par anterior, mid to distal anteroseptal, apical, and inferior\par walls do not demonstrate significant viability. The\par septum demonstrates mild viability. The remaining segments\par are viable (predominantly the lateral wall only).\par ------------------------------------------------------------------------\par \par \par \par \par Assessment and Plan:\par - Assessment \par 77 year old Male with PMH of bipolar disorder, CAD/STEMI (5/2022) s/p R/LHC\par with IABP support which showed  mid %, LAD 70%, D1 70% and RA 15,\par PCWP 31, PA sat 59%, CO 3.63, CI 1.70, PVR 2.76; medical management\par recommended; TTE showed LV thrombus treated with Coumadin, HFrEF (EF 34%, LVIDd\par 6.1, moderate TR and severe PH), RUL mass s/p R. VATS (9/23/2022) c/b\par cardiogenic shock requiring inotropic support and diuretics. Pertinent labs:\par AST 2768, ALT 1653 RHC: RA 15, PCWP 31, PA sat 59%, CO 3.63, CI 1.70, PVR\par 2.76.\par 9/28 Dobutamine weaned off and hemodynamically stable\par 9/29 Lactate = 1.3 and VSat = 53.9% CO/CI 3.2/1.5\par BNP decreased to 3,514 AST/ALT decreased to 351/652\par \par \par \par \par \par Problem/Plan - \par Attestation Statements:\par Attestation Statements:\par Attending and PA/NP shared services statement (NON-critical care):\par \par Attending to bill.\par \par I independently performed the documented:\par \par Medical decision making.\par \par \par \par                      \par \par PROCEDURE DATE:  10/01/2022  CXR\par INTERPRETATION:  INDICATION: S/P VATS\par COMPARISON: 9/30/22\par Technique: AP radiograph of the chest\par FINDINGS:\par S/P removal of right IJ line.\par Heart/Vascular: Stable mild cardiomegaly.\par Pulmonary: New, small pleural effusion. Remainder of the lungs are clear. \par No focal infiltrate. No pneumothorax\par Bones: No acute bony finding\par Impression:\par New, small right pleural effusion.\par \par \par \par

## 2022-10-17 NOTE — ADDENDUM
[FreeTextEntry1] : Called with results notable for normal K 3.8 and BUN/creat 24/1.0. Discussed with Dr. Shell and will increase Entresto to 49-51 mg bid and may decrease furosemide to 40 mg daily from twice a day. Will add spironalactone 12.5 mg daily which is 1/2 of a 25 mg tab. Schedule appt with interventional cardiologist Dr. Stew Helms at [place] [phone]. \par \par 10/17/22 at 10:25 am- Spoke to patient's wife. Weight 173 lbs today and B/P 116/69. WIll increase Entresto to 49-51 mg bid, decrease furosemide to 40 mg daily and start brenda 12.5 mg daily. WIll repeat labs in 2 weeks. Will call with B/P and weight readings. Pt to follow up with [facility] today.

## 2022-10-20 ENCOUNTER — NON-APPOINTMENT (OUTPATIENT)
Age: 77
End: 2022-10-20

## 2022-10-21 ENCOUNTER — OUTPATIENT (OUTPATIENT)
Dept: OUTPATIENT SERVICES | Facility: HOSPITAL | Age: 77
LOS: 1 days | Discharge: HOME | End: 2022-10-21

## 2022-10-21 DIAGNOSIS — Z98.890 OTHER SPECIFIED POSTPROCEDURAL STATES: Chronic | ICD-10-CM

## 2022-10-21 DIAGNOSIS — C34.00 MALIGNANT NEOPLASM OF UNSPECIFIED MAIN BRONCHUS: ICD-10-CM

## 2022-10-24 ENCOUNTER — APPOINTMENT (OUTPATIENT)
Dept: HEMATOLOGY ONCOLOGY | Facility: CLINIC | Age: 77
End: 2022-10-24

## 2022-10-24 ENCOUNTER — NON-APPOINTMENT (OUTPATIENT)
Age: 77
End: 2022-10-24

## 2022-10-24 VITALS
HEART RATE: 81 BPM | RESPIRATION RATE: 18 BRPM | DIASTOLIC BLOOD PRESSURE: 69 MMHG | BODY MASS INDEX: 23.92 KG/M2 | WEIGHT: 176.37 LBS | TEMPERATURE: 96.8 F | SYSTOLIC BLOOD PRESSURE: 98 MMHG | OXYGEN SATURATION: 86 %

## 2022-10-24 VITALS — OXYGEN SATURATION: 99 %

## 2022-10-24 DIAGNOSIS — Z78.9 OTHER SPECIFIED HEALTH STATUS: ICD-10-CM

## 2022-10-24 DIAGNOSIS — Z80.0 FAMILY HISTORY OF MALIGNANT NEOPLASM OF DIGESTIVE ORGANS: ICD-10-CM

## 2022-10-24 PROCEDURE — 99205 OFFICE O/P NEW HI 60 MIN: CPT

## 2022-10-24 NOTE — HISTORY OF PRESENT ILLNESS
[Disease: _____________________] : Disease: [unfilled] [N: ___] : N[unfilled] [AJCC Stage: ____] : AJCC Stage: [unfilled] [T: ___] : T[unfilled] [de-identified] : Patient presenting at the request of his pulmonologist for oncology consultation for diagnosis of lung cancer.  \par \par 77 year old male, never smoker, w/ hx of bipolar, BPH, SCC of leg, CHF, CAD, hospitalized on May 11-17/2022 for acute MI s/p cardiac Cath with no intervention (LHC showed 100% occlusion for right coronary artery and 70% occlusion of left anterior descending artery. The arteries were not intervened upon due to viability study showing no viability in the areas supplied by the occluded arteries), cardiogenic shock, found to have new 1x 1.3cm LV thrombus on Plavix and Coumadin (Coumadin completed on 08/26/2022), who f/u with cardiologist and c/o SOB, CXR showed RUL nodule.\par \par 7/11/2022-CT chest-3.5 cm part solid mass is noted in the right upper lobe as described above. Primary consideration is lung carcinoma.\par \par 7/28/2022–PET/CT scan–FDG avid partly solid right upper lung nodule suspicious for malignancy.  Small bilateral pleural effusion with minimal FDG avidity, nonspecific.  Nonspecific mildly FDG avid low-attenuation left adrenal nodule, possibly an adenoma.  Heterogeneous FDG activity in the soft tissue associated with surgical clips overlying the symphysis pubis, probably related from prior procedure.\par \par 9/14/2022–CT-guided biopsy of right upper lung mass–positive for malignant cells, adenocarcinoma.  PD-L1 () TPS 0%. ALK negative.  EGFR mutation analysis–+ EGFR mutation ([id number], CTG>CGG).\par \par 9/23/2022–Status post right VATS-right upper lobectomy-pathology revealed adenocarcinoma, acinar predominant, margin negative.  One level 10 lymph node excised and 5 interlobar lymph nodes negative for carcinoma.  Right pleural fluid negative for malignant cells.\par Intraoperative course complicated by cardiogenic shock requiring inotropic support and diuretics.\par \par Very anxious. No complaints of chest pain; + mild, intermittent, improving cough; no hemoptysis; +mild OGLESBY; no fevers.  No neurologic complaints.  No complaints of bone pain.  No GI complaints.\par Sees Dr. Yu regularly (Psych).\par \par Has had COVID vaccines (Moderna) x 4. Plans to get booster. [de-identified] : Moderately differentiated invasive acinar adenocarcinoma [de-identified] :  PD-L1 () TPS 0%. ALK negative.  EGFR mutation analysis–+ EGFR mutation ([id number], CTG>CGG).

## 2022-10-24 NOTE — CONSULT LETTER
[Dear  ___] : Dear  [unfilled], [Consult Letter:] : I had the pleasure of evaluating your patient, [unfilled]. [Please see my note below.] : Please see my note below. [Consult Closing:] : Thank you very much for allowing me to participate in the care of this patient.  If you have any questions, please do not hesitate to contact me. [Sincerely,] : Sincerely, [DrAnastacio  ___] : Dr. BLAKE [DrAnastacio ___] : Dr. BLAKE [FreeTextEntry3] : Mayelin Burden MD

## 2022-10-24 NOTE — REVIEW OF SYSTEMS
[Negative] : Allergic/Immunologic [Cough] : cough [SOB on Exertion] : shortness of breath during exertion [Anxiety] : anxiety

## 2022-10-24 NOTE — ASSESSMENT
[FreeTextEntry1] : 10/17/2022 Thoracic surgery note, 10/13/22 cardiology note, CT scan report, PET/CT scan report, lung pathology reports reviewed.\par #1) Stage IB (pT2aN0) adenocarcinoma of the lung, moderately differentiated–status post right upper lobectomy/lymph node sampling.  Intraoperative course complicated by cardiogenic shock.  PD-L1 () TPS 0%. ALK negative.  EGFR mutation analysis–+ EGFR mutation ([id number], CTG>CGG).\par Patient has now recovered from surgery.\par Reviewed diagnosis/prognosis with cancer recurrence risks and management options.  Discussed roles of surgery/radiation/systemic therapy in lung cancer management.  He has had surgery.  Margin negative, LN negative. No plans for radiation at this point.\par Per NCCN guidelines, may consider observation with expectant surveillance or chemotherapy for high risk patients and osimertinib (if EGFR exon 19 deletion or [id number]).  Examples of high risk features may include poorly differentiated tumors, vascular invasion, wedge resection, tumors greater than 4 cm, visceral pleural involvement or unknown lymph node status–these features do not apply in this case-in light of this and patient's comorbidities, favor expectant surveillance approach.  He has a follow-up CT scan of the chest scheduled in January per Dr. Padilla.\par Did discuss the EGFR mutation with potential implications in the future with treatment available if needed.\par \par #2) anemia/thrombocytosis-suspect recent surgery contributing. F/U lab work ordered. If refractory, can consider further evaluation.\par \par Patient was given the opportunity to ask questions.  His questions have been answered to his apparent satisfaction at this time.  He concurs with plans of care.\par Case d/w Dr. Espinal today.

## 2022-10-27 ENCOUNTER — LABORATORY RESULT (OUTPATIENT)
Age: 77
End: 2022-10-27

## 2022-10-27 ENCOUNTER — APPOINTMENT (OUTPATIENT)
Dept: HEART FAILURE | Facility: CLINIC | Age: 77
End: 2022-10-27

## 2022-10-27 ENCOUNTER — NON-APPOINTMENT (OUTPATIENT)
Age: 77
End: 2022-10-27

## 2022-10-27 VITALS
HEIGHT: 72 IN | TEMPERATURE: 96.8 F | SYSTOLIC BLOOD PRESSURE: 92 MMHG | OXYGEN SATURATION: 98 % | WEIGHT: 177 LBS | HEART RATE: 83 BPM | DIASTOLIC BLOOD PRESSURE: 61 MMHG | BODY MASS INDEX: 23.98 KG/M2

## 2022-10-27 PROCEDURE — 36415 COLL VENOUS BLD VENIPUNCTURE: CPT

## 2022-10-27 PROCEDURE — 93000 ELECTROCARDIOGRAM COMPLETE: CPT

## 2022-10-27 PROCEDURE — 99214 OFFICE O/P EST MOD 30 MIN: CPT

## 2022-10-27 NOTE — HISTORY OF PRESENT ILLNESS
[FreeTextEntry1] : 77 year old Male with PMH of bipolar disorder, CAD/STEMI (5/2022) s/p R/LHC with IABP support which showed  mid %, LAD 70%, D1 70% and RA 15, PCWP 31, PA sat 59%, CO 3.63, CI 1.70, PVR 2.76; medical management recommended; TTE showed LV thrombus treated with Coumadin (completed 8/26/22) , HFrEF (EF 34%, LVIDd6.1, moderate TR and severe PH), RUL mass s/p R. VATS (9/23/2022) c/b cardiogenic shock requiring inotropic support and diuretics. Pertinent labs:AST 2768, ALT 1653 RHC: RA 15, PCWP 31, PA sat 59%, CO 3.63, CI 1.70, PVR.2.7. Pt is here for a follow up. Accompanied by his wife. Card:Dr. Walden.\par \par Pt states he has been feeling better since last appt. on 10/13/22. Since last visit, he increased the Entresto to 49-51 mg bid, started brenda 12.5 mg daily and increased metoprolol to 25 mg bid. States D/c weight was approx 174 lbs and he is 171- 174 lbs at home and 177 lbs in office with clothes with B/P at home 96/64 to 113/72., HR 83\par \par He followed up with CTSX 10/17/22 and will be having a CT scan 12/2022. He has some improvement in dry cough. He had a prior  CXR before d/c with right pleural effusion.  \par He had a viability study with reported left ventricle was normal in size. The mid to distal anterior, mid to distal anteroseptal, apical, and inferior walls do not demonstrate significant viability. The septum demonstrates mild viability. The remaining segments are viable (predominantly the lateral wall only). He will be making an appt to follow up with interventional card Dr. Helms.\par \par States he can walk for 20 minutes and can climb 1-2 flights of stairs without dyspnea. He sleeps with 1 pillow with no orthopnea. He is adhering to a low salt diet and is drinking less than 2 liters of fluid per day\par He denies chest pain, palpitations, dizziness/LH and syncope and he does not have an ICD. \par Of note, he is  , non smoker, no ETOH, + family history of CAD in brothers; one with ICD and 3 V CABG, other with CHF, stents\par He will be going to [place] from 1/11-3/2/22. \par \par \par \par \par Echocardiogram:\par Transthoracic Echocardiogram (09.24.22 @ 14:33)\par DIMENSIONS:\par Dimensions: Normal Values:\par LA: 4.1 cm 2.0 - 4.0 cm\par Ao: 3.7 cm 2.0 - 3.8 cm\par SEPTUM: 1.2 cm 0.6 - 1.2 cm\par PWT: 1.2 cm 0.6 - 1.1 cm\par LVIDd: 6.1 cm 3.0 - 5.6 cm\par LVIDs: --- 1.8 - 4.0 cm\par Derived Variables:\par LVMI: 161 g/m2\par RWT: 0.39\par Ejection Fraction (Modified Pizarro Rule): 34 %\par ------------------------------------------------------------------------\par OBSERVATIONS:\par Mitral Valve: Mitral annular calcification, otherwise\par normal mitral valve. Moderate mitral regurgitation.\par Aortic Root: Normal aortic root.\par Aortic Valve: Calcified trileaflet aortic valve with normal\par opening. Mild aortic regurgitation.\par Left Atrium: Normal left atrium. LA volume index = 28\par cc/m2.\par Left Ventricle: Severe global left ventricular systolic\par dysfunction. Severe left ventricular enlargement. Moderate\par diastolic dysfunction (Stage II).\par Right Heart: Normal right atrium. Right ventricular\par enlargement with normal right ventricular systolic\par function. Normal tricuspid valve. Moderate tricuspid\par regurgitation. Normal pulmonic valve. Mild pulmonic\par regurgitation.\par Pericardium/PleuraNormal pericardium with no pericardial\par effusion.\par Hemodynamic: Estimated right ventricular systolic pressure\par equals 82 mm Hg, assuming right atrial pressure equals 10\par mm Hg, consistent with severe pulmonary hypertension.\par ------------------------------------------------------------------------\par CONCLUSIONS:\par 1. Mitral annular calcification, otherwise normal mitral\par valve. Moderate mitral regurgitation.\par 2. Severe left ventricular enlargement.\par 3. Severe global left ventricularsystolic dysfunction.\par 4. Moderate diastolic dysfunction (Stage II).\par 5. Right ventricular enlargement with normal right\par ventricular systolic function.\par 6. Estimated pulmonary artery systolic pressure equals 82\par mm Hg, assuming right atrial pressureequals 10 mm Hg,\par consistent with severe pulmonary hypertension.\par \par  Cardiac Catheterization (05.11.22 @ 15:16) >\par Intra-aortic Balloon Pump\par An intra-aortic balloon pump was inserted.\par Diagnostic Findings:\par Coronary Angiography\par The coronary circulation is right dominant.\par LM\par Left main artery: Angiography shows no disease.\par LAD\par Proximal left anterior descending: There is a 70 % stenosis. First\par diagonal: There is a 70 % stenosis.\par Patient: ELYSE MALAVE MRN: [id number]\par Study Date: 05/11/2022 03:16 PM Page 1 of 4\par CX\par Circumflex: Angiography shows no disease.\par RCA\par Mid right coronary artery: There is a 100 % stenosis.\par Exam Start Time: 03:16 PM\par Exam End Time: 03:53 PM\par Exam Duration: 37 min\par Patient: ELYSE MALAVE MRN: [id number]\par Study Date: 05/11/2022 03:16 PM Page 2 of 4\par Hemodynamic Pressures:\par Phase Location [mmHg] [mmHg]\par [mmHg] HR\par Baseline LV s 115\par ed 36 88\par Baseline Ao s 126 d 85\par m 102 103\par Baseline RV s 50\par ed 17 81\par Baseline PA s 56 d 31\par m 41 81\par Baseline PCW a 32 v 34\par m 31 77\par Baseline RA a 20 v 16\par m 15 95\par \par Oxygen Saturations\par Phase Location Hb Sat pO2\par Content Group\par Baseline AO 14 99\par 19.33 SA\par Baseline PA 14 59\par 11.48 PA\par \par Oxygen Saturation Average, Phase: Baseline\par PV Hb PV Sat PV pO2\par PV Content\par SA Hb 14.40 g/dL SA Sat 98.70 % SA pO2\par SA Content 19.33 %\par PA Hb 14.40 g/dL PA Sat 58.60 % PA pO2\par PA Content 11.48 %\par MV Hb MV Sat MV pO2\par MV Content\par Strokework:\par Phase: Baseline\par LVSW: 34.02 g*m LVSW (index):\par 15.96 g*m/m2\par RVSW: 15.84 g*m RVSW (index):\par 7.43 g*m/m2\par Flow Calculations, Phase: Baseline\par CO 3.63 l/min HR\par O2Insp\par CI 1.70 l/min/m2 PO2\par O2Exp\par SV VO2 285.00 ml/min\par O2(ExAir)\par SVI A-VO2\par Hb 14.40 gm/d\par Shunts:\par Qs 3.63 l/min Qs Index 1.70\par l/min/m2\par Qp 3.63 l/min Qp Index 1.70\par l/min/m2 Qp/Qs\par EPBF EPBF Index\par L-R L-R Index\par R-L R-L Index\par Systemic Resistances, Phase: Baseline\par SVR 1917.35 dyn*s/cm5 TVR\par 2247.93 dyn*s/cm5\par \par Patient: ELYSE MALAVE MRN: [id number]\par Study Date: 05/11/2022 03:16PM Page 3 of 4\par \par \par \par \par SVRI 4086.28 dyn*s*m2/cm5 TVRI 4790.81 dyn*s*m2/cm5\par SVR 23.97 MERCADO TVR\par 28.11 MERCADO\par SVRI 51.09 MERCADO*m2 TVRI\par 59.90 MERCADO*m2\par Pulmonary Resistances:\par .39 dyn*s/cm5 TPR\par 903.58 dyn*s/cm5\par PVRI 469.69 dyn*s*m2/cm5 TPRI\par 1925.72 dyn*s*m2/cm5\par PVR 2.76 MERCADO TPR\par 11.30 MERCADO\par PVRI 5.87 MERCADO*m2 TPRI\par 24.08 MERCADO*m2\par Ratios:\par PVR-SVR 0.11 TPR-TVR\par 0.4\par Shunts:\par venO2 CO\par R-L Shunt R-L Shunt\par Sammy. Method\par L-R Shunt L-R Shunt\par R-L Shunt R-L Shunt\par Sammy. Method\par \par Conscious sedation was administered and monitored by Cardiology\par nursing staff,\par under my direct supervision when applicable.\par \par Electronically signed by Stew Helms MD on 05/11/2022 at 05:17 PM\par \par \par \par \par Cardiac Viability (05.12.22 @ 15:17)\par NUCLEAR FINDINGS:\par The left ventricle was normal in size. The mid to distal\par anterior, mid to distalanteroseptal, apical, and inferior\par walls do not demonstrate significant viability. The\par septum demonstrates mild viability. The remaining segments\par are viable (predominantly the lateral wall only).\par ------------------------------------------------------------------------\par GATED ANALYSIS:\par Rest gated wall motion analysis was performed (LVEF = 29\par %;LVEDV = 205 ml.), revealing markedly reduced LV\par function with regional variation.\par ------------------------------------------------------------------------\par IMPRESSIONS:Abnormal Study\par * The left ventricle was normal in size. The mid to distal\par anterior, mid to distal anteroseptal, apical, and inferior\par walls do not demonstrate significant viability. The\par septum demonstrates mild viability. Theremaining segments\par are viable (predominantly the lateral wall only).\par * Rest gated wall motion analysis was performed (LVEF = 29\par %;LVEDV = 205 ml.), revealing markedly reduced LV\par function with regional variation.\par Conclusion:\par The left ventricle was normal in size. The mid to distal\par anterior, mid to distal anteroseptal, apical, and inferior\par walls do not demonstrate significant viability. The\par septum demonstrates mild viability. The remaining segments\par are viable (predominantly the lateral wall only).\par ------------------------------------------------------------------------\par \par \par \par \par Assessment and Plan:\par - Assessment \par 77 year old Male with PMH of bipolar disorder, CAD/STEMI (5/2022) s/p R/LHC\par with IABP support which showed  mid %, LAD 70%, D1 70% and RA 15,\par PCWP 31, PA sat 59%, CO 3.63, CI 1.70, PVR 2.76; medical management\par recommended; TTE showed LV thrombus treated with Coumadin, HFrEF (EF 34%, LVIDd\par 6.1, moderate TR and severe PH), RUL mass s/p R. VATS (9/23/2022) c/b\par cardiogenic shock requiring inotropic support and diuretics. Pertinent labs:\par AST 2768, ALT 1653 RHC: RA 15, PCWP 31, PA sat 59%, CO 3.63, CI 1.70, PVR\par 2.76.\par 9/28 Dobutamine weaned off and hemodynamically stable\par 9/29 Lactate = 1.3 and VSat = 53.9% CO/CI 3.2/1.5\par BNP decreased to 3,514 AST/ALT decreased to 351/652\par \par \par \par \par \par Problem/Plan - \par Attestation Statements:\par Attestation Statements:\par Attending and PA/NP shared services statement (NON-critical care):\par \par Attending to bill.\par \par I independently performed the documented:\par \par Medical decision making.\par \par \par \par                      \par \par PROCEDURE DATE:  10/01/2022  CXR\par INTERPRETATION:  INDICATION: S/P VATS\par COMPARISON: 9/30/22\par Technique: AP radiograph of the chest\par FINDINGS:\par S/P removal of right IJ line.\par Heart/Vascular: Stable mild cardiomegaly.\par Pulmonary: New, small pleural effusion. Remainder of the lungs are clear. \par No focal infiltrate. No pneumothorax\par Bones: No acute bony finding\par Impression:\par New, small right pleural effusion.\par \par \par \par

## 2022-10-27 NOTE — ASSESSMENT
[FreeTextEntry1] : 77 year old Male with PMH of bipolar disorder, CAD/STEMI (5/2022) s/p R/LHC with IABP support which showed  mid %, LAD 70%, D1 70% and RA 15, PCWP 31, PA sat 59%, CO 3.63, CI 1.70, PVR 2.76; medical management recommended; TTE showed LV thrombus treated with Coumadin, HFrEF (EF 34%, LVIDd\par 6.1, moderate TR and severe PH), RUL mass s/p R. VATS (9/23/2022) c/b cardiogenic shock requiring inotropic support and diuretics. \par Pertinent labs:\par AST 2768, ALT 1653 RHC: RA 15, PCWP 31, PA sat 59%, CO 3.63, CI 1.70, PVR\par 2.7.  VSat = 53.9% CO/CI 3.2/1.5 BNP decreased to 3,514 AST/ALT decreased to 351/652\par ACC/AHA Stage, NYHA Class II symptoms\par Appears compensated and with B/P 92.61\par \par \par \par \par

## 2022-10-27 NOTE — PHYSICAL EXAM
[Well Nourished] : well nourished [No Acute Distress] : no acute distress [Normal Conjunctiva] : normal conjunctiva [Normal S1, S2] : normal S1, S2 [Soft] : abdomen soft [Non Tender] : non-tender [Normal Gait] : normal gait [No Edema] : no edema [Normal] : alert and oriented, normal memory [Alert and Oriented] : alert and oriented [de-identified] : JVP 8 cm  [de-identified] : decreased right base , dry cough

## 2022-10-27 NOTE — DISCUSSION/SUMMARY
[Patient] : the patient [FreeTextEntry2] : wife [FreeTextEntry3] : 4 [FreeTextEntry1] : 1. S/P  Acute on chronic systolic congestive heart failure.\par - Improving volume status\par - start Farxiga 5 mg dally \par - continue Lasix 40mg daily with additional as needed for weight gain of 2-3 lbs in 2-3 days\par - continue Entresto 49-51 mg bid\par - continue Toprol XL 25 mg bid by next week\par - continue brenda 12.5 mg daily\par -Daily  weight and B/P log, pt given HF booklet and digital scale\par - labs 10/13/22  K 3.8, BUN/creat 1.00 and decrease in serum pro BNP to 3080 from 3500, will repeat today with hematology labs\par - limit salt to less than 2000 mg per day\par - limit fluid to less than 2 liters per day\par - will repeat TTE on GDMT and if LVEF < 35%, will refer for ICD\par \par 2. CAD \par - refer to Dr. Helms to discuss possible PCI\par \par Follow up in office in 4 weeks for further med up titration, will call with labs [EKG obtained to assist in diagnosis and management of assessed problem(s)] : EKG obtained to assist in diagnosis and management of assessed problem(s)

## 2022-10-27 NOTE — CARDIOLOGY SUMMARY
[de-identified] : 10/27/22  NSR 71,  LAFB, PRWP, NSST \par 10/13/22 NSR 98, LAFB, PRWP, NSST with PVC's [de-identified] : The left ventricle was normal in size. The mid to distal\par anterior, mid to distal anteroseptal, apical, and inferior\par walls do not demonstrate significant viability. The\par septum demonstrates mild viability. The remaining segments\par are viable (predominantly the lateral wall only). [de-identified] : 9/2022 RHC: RA 15, PCWP 31, PA sat 59%, CO 3.63, CI 1.70, PVR 2.76.\par 9/2022:  s/p R/LHC with IABP support which showed  mid %, LAD 70%, D1 70% and RA 15,\par PCWP 31, PA sat 59%, CO 3.63, CI 1.70, PVR 2.76; medical management

## 2022-10-28 ENCOUNTER — NON-APPOINTMENT (OUTPATIENT)
Age: 77
End: 2022-10-28

## 2022-10-28 LAB
ALBUMIN SERPL ELPH-MCNC: 3.4 G/DL
ALP BLD-CCNC: 148 U/L
ALT SERPL-CCNC: 23 U/L
ANION GAP SERPL CALC-SCNC: 15 MMOL/L
AST SERPL-CCNC: 29 U/L
BILIRUB SERPL-MCNC: 1 MG/DL
BUN SERPL-MCNC: 26 MG/DL
CALCIUM SERPL-MCNC: 9.4 MG/DL
CHLORIDE SERPL-SCNC: 101 MMOL/L
CO2 SERPL-SCNC: 21 MMOL/L
CREAT SERPL-MCNC: 1.2 MG/DL
EGFR: 62 ML/MIN/1.73M2
NT-PROBNP SERPL-MCNC: 2448 PG/ML
POTASSIUM SERPL-SCNC: 5.1 MMOL/L
PROT SERPL-MCNC: 7.1 G/DL
SODIUM SERPL-SCNC: 137 MMOL/L

## 2022-10-31 LAB
ESTIMATED AVERAGE GLUCOSE: 137 MG/DL
HBA1C MFR BLD HPLC: 6.4 %

## 2022-11-07 ENCOUNTER — NON-APPOINTMENT (OUTPATIENT)
Age: 77
End: 2022-11-07

## 2022-11-14 ENCOUNTER — APPOINTMENT (OUTPATIENT)
Dept: CARDIOLOGY | Facility: CLINIC | Age: 77
End: 2022-11-14
Payer: MEDICARE

## 2022-11-14 VITALS
BODY MASS INDEX: 23.98 KG/M2 | OXYGEN SATURATION: 100 % | SYSTOLIC BLOOD PRESSURE: 100 MMHG | WEIGHT: 177 LBS | HEIGHT: 72 IN | HEART RATE: 79 BPM | DIASTOLIC BLOOD PRESSURE: 63 MMHG

## 2022-11-14 PROCEDURE — 99205 OFFICE O/P NEW HI 60 MIN: CPT

## 2022-11-14 PROCEDURE — 93000 ELECTROCARDIOGRAM COMPLETE: CPT

## 2022-11-14 PROCEDURE — 99215 OFFICE O/P EST HI 40 MIN: CPT

## 2022-11-14 RX ORDER — OXYCODONE 5 MG/1
5 TABLET ORAL
Qty: 20 | Refills: 0 | Status: DISCONTINUED | COMMUNITY
Start: 2022-10-01

## 2022-11-14 RX ORDER — METOPROLOL SUCCINATE 50 MG/1
50 TABLET, EXTENDED RELEASE ORAL
Qty: 90 | Refills: 0 | Status: DISCONTINUED | COMMUNITY
Start: 2022-05-17

## 2022-11-14 RX ORDER — DESVENLAFAXINE 25 MG/1
25 TABLET, EXTENDED RELEASE ORAL
Qty: 30 | Refills: 0 | Status: DISCONTINUED | COMMUNITY
Start: 2022-07-19

## 2022-11-14 RX ORDER — LOSARTAN POTASSIUM 25 MG/1
25 TABLET, FILM COATED ORAL
Qty: 30 | Refills: 0 | Status: DISCONTINUED | COMMUNITY
Start: 2022-05-17

## 2022-11-14 RX ORDER — CLONAZEPAM 0.25 MG/1
0.25 TABLET, ORALLY DISINTEGRATING ORAL 3 TIMES DAILY
Refills: 0 | Status: DISCONTINUED | COMMUNITY
Start: 2022-06-06 | End: 2022-11-14

## 2022-11-14 RX ORDER — CLONAZEPAM 0.5 MG/1
0.5 TABLET ORAL
Qty: 60 | Refills: 0 | Status: DISCONTINUED | COMMUNITY
Start: 2022-05-17

## 2022-11-14 RX ORDER — ONDANSETRON 4 MG/1
4 TABLET ORAL
Qty: 45 | Refills: 0 | Status: DISCONTINUED | COMMUNITY
Start: 2022-05-17

## 2022-11-23 ENCOUNTER — NON-APPOINTMENT (OUTPATIENT)
Age: 77
End: 2022-11-23

## 2022-11-26 NOTE — REVIEW OF SYSTEMS
[Cough] : cough [SOB on Exertion] : shortness of breath during exertion [Anxiety] : anxiety [Negative] : Allergic/Immunologic

## 2022-11-28 ENCOUNTER — APPOINTMENT (OUTPATIENT)
Dept: CV DIAGNOSITCS | Facility: HOSPITAL | Age: 77
End: 2022-11-28

## 2022-11-28 ENCOUNTER — NON-APPOINTMENT (OUTPATIENT)
Age: 77
End: 2022-11-28

## 2022-11-28 ENCOUNTER — APPOINTMENT (OUTPATIENT)
Dept: HEART FAILURE | Facility: CLINIC | Age: 77
End: 2022-11-28

## 2022-11-28 ENCOUNTER — OUTPATIENT (OUTPATIENT)
Dept: OUTPATIENT SERVICES | Facility: HOSPITAL | Age: 77
LOS: 1 days | End: 2022-11-28

## 2022-11-28 VITALS
BODY MASS INDEX: 24.28 KG/M2 | DIASTOLIC BLOOD PRESSURE: 72 MMHG | SYSTOLIC BLOOD PRESSURE: 108 MMHG | TEMPERATURE: 97.4 F | WEIGHT: 179 LBS | RESPIRATION RATE: 16 BRPM | HEART RATE: 76 BPM

## 2022-11-28 DIAGNOSIS — Z98.890 OTHER SPECIFIED POSTPROCEDURAL STATES: Chronic | ICD-10-CM

## 2022-11-28 DIAGNOSIS — I50.9 HEART FAILURE, UNSPECIFIED: ICD-10-CM

## 2022-11-28 PROCEDURE — 93306 TTE W/DOPPLER COMPLETE: CPT | Mod: 26

## 2022-11-28 PROCEDURE — 36415 COLL VENOUS BLD VENIPUNCTURE: CPT

## 2022-11-28 PROCEDURE — 99215 OFFICE O/P EST HI 40 MIN: CPT

## 2022-11-28 PROCEDURE — 93000 ELECTROCARDIOGRAM COMPLETE: CPT

## 2022-11-28 RX ORDER — SACUBITRIL AND VALSARTAN 49; 51 MG/1; MG/1
49-51 TABLET, FILM COATED ORAL
Qty: 60 | Refills: 1 | Status: DISCONTINUED | COMMUNITY
Start: 2022-10-13 | End: 2022-11-28

## 2022-11-30 ENCOUNTER — APPOINTMENT (OUTPATIENT)
Dept: HEMATOLOGY ONCOLOGY | Facility: CLINIC | Age: 77
End: 2022-11-30

## 2022-11-30 ENCOUNTER — NON-APPOINTMENT (OUTPATIENT)
Age: 77
End: 2022-11-30

## 2022-11-30 VITALS
BODY MASS INDEX: 23.87 KG/M2 | WEIGHT: 176 LBS | SYSTOLIC BLOOD PRESSURE: 101 MMHG | OXYGEN SATURATION: 99 % | DIASTOLIC BLOOD PRESSURE: 61 MMHG | RESPIRATION RATE: 15 BRPM | TEMPERATURE: 96.7 F | HEART RATE: 80 BPM

## 2022-11-30 LAB
ALBUMIN SERPL ELPH-MCNC: 4.1 G/DL
ALP BLD-CCNC: 159 U/L
ALT SERPL-CCNC: 48 U/L
ANION GAP SERPL CALC-SCNC: 13 MMOL/L
AST SERPL-CCNC: 47 U/L
BASOPHILS # BLD AUTO: 0.05 K/UL
BASOPHILS NFR BLD AUTO: 0.8 %
BILIRUB SERPL-MCNC: 1 MG/DL
BUN SERPL-MCNC: 34 MG/DL
CALCIUM SERPL-MCNC: 9.5 MG/DL
CHLORIDE SERPL-SCNC: 103 MMOL/L
CO2 SERPL-SCNC: 21 MMOL/L
CREAT SERPL-MCNC: 1.16 MG/DL
EGFR: 65 ML/MIN/1.73M2
EOSINOPHIL # BLD AUTO: 0.19 K/UL
EOSINOPHIL NFR BLD AUTO: 3.2 %
ESTIMATED AVERAGE GLUCOSE: 128 MG/DL
HBA1C MFR BLD HPLC: 6.1 %
HCT VFR BLD CALC: 38.8 %
HGB BLD-MCNC: 12.4 G/DL
IMM GRANULOCYTES NFR BLD AUTO: 0.2 %
LYMPHOCYTES # BLD AUTO: 1.74 K/UL
LYMPHOCYTES NFR BLD AUTO: 29.4 %
MAN DIFF?: NORMAL
MCHC RBC-ENTMCNC: 30 PG
MCHC RBC-ENTMCNC: 32 GM/DL
MCV RBC AUTO: 93.9 FL
MONOCYTES # BLD AUTO: 0.77 K/UL
MONOCYTES NFR BLD AUTO: 13 %
NEUTROPHILS # BLD AUTO: 3.15 K/UL
NEUTROPHILS NFR BLD AUTO: 53.4 %
NT-PROBNP SERPL-MCNC: 3174 PG/ML
PLATELET # BLD AUTO: 215 K/UL
POTASSIUM SERPL-SCNC: 5.6 MMOL/L
PROT SERPL-MCNC: 7.1 G/DL
RBC # BLD: 4.13 M/UL
RBC # FLD: 16.6 %
SODIUM SERPL-SCNC: 138 MMOL/L
WBC # FLD AUTO: 5.91 K/UL

## 2022-11-30 PROCEDURE — 99213 OFFICE O/P EST LOW 20 MIN: CPT

## 2022-11-30 NOTE — DISCUSSION/SUMMARY
[Patient] : the patient [EKG obtained to assist in diagnosis and management of assessed problem(s)] : EKG obtained to assist in diagnosis and management of assessed problem(s) [FreeTextEntry2] : wife [FreeTextEntry3] : 4 [FreeTextEntry1] : 1. Chronic systolic congestive heart failure. with decline in LVEF today on TTE to 22% from 34%\par - low B/P and fatigue may be secondary to low flow\par - restart Farxiga 5 mg dally \par - continue Lasix 40mg daily with additional as needed for weight gain of 2-3 lbs in 2-3 days\par - will  d/c Entresto 49-51 mg bid and will start losartan 50 mg daily, will see if there is improvement in B/P and fatigue\par - continue Toprol XL 25 mg bid \par - continue brenda 12.5 mg daily\par - dontinue Daily  weight and B/P log\par - labs 10/27/22  with K 5.1 and BUN/creat 1.20 26/with decrease in serum pro BNP to 2448 from 3080 from 3500,  will repeat today \par - limit salt to less than 2000 mg per day\par - limit fluid to less than 2 liters per day\par - TTE  today on GDMT with LVEF 22%, will refer  to EP for ICD\par \par 2. CAD - no active chest pain\par - will repeat Cardiac viability study due to decline in LVEF and will  discuss possible PCI with interventional cardiologist Dr. Helms \par \par 3. Elevated HgA1C/ DM\par - consider starting metformin\par - Hg A1C 6.4 from 6.2%\par - restart Farxiga 5 mg daily, pt given coupon cards\par \par Follow up in office in 3 weeks,  will call with labs and viability study

## 2022-11-30 NOTE — HISTORY OF PRESENT ILLNESS
[Disease: _____________________] : Disease: [unfilled] [T: ___] : T[unfilled] [N: ___] : N[unfilled] [AJCC Stage: ____] : AJCC Stage: [unfilled] [6 - Distress Level] : Distress Level: 6 [de-identified] : 77 year old male, never smoker, w/ hx of bipolar, BPH, SCC of leg, CHF, CAD, hospitalized on May 11-17/2022 for acute MI s/p cardiac Cath with no intervention (LHC showed 100% occlusion for right coronary artery and 70% occlusion of left anterior descending artery. The arteries were not intervened upon due to viability study showing no viability in the areas supplied by the occluded arteries), cardiogenic shock, found to have new 1x 1.3cm LV thrombus on Plavix and Coumadin (Coumadin completed on 08/26/2022), who f/u with cardiologist and c/o SOB, CXR showed RUL nodule.\par \par 7/11/2022-CT chest-3.5 cm part solid mass is noted in the right upper lobe as described above. Primary consideration is lung carcinoma.\par \par 7/28/2022–PET/CT scan–FDG avid partly solid right upper lung nodule suspicious for malignancy.  Small bilateral pleural effusion with minimal FDG avidity, nonspecific.  Nonspecific mildly FDG avid low-attenuation left adrenal nodule, possibly an adenoma.  Heterogeneous FDG activity in the soft tissue associated with surgical clips overlying the symphysis pubis, probably related from prior procedure.\par \par 9/14/2022–CT-guided biopsy of right upper lung mass–positive for malignant cells, adenocarcinoma.  PD-L1 () TPS 0%. ALK negative.  EGFR mutation analysis–+ EGFR mutation ([id number], CTG>CGG).\par \par 9/23/2022–Status post right VATS-right upper lobectomy-pathology revealed adenocarcinoma, acinar predominant, margin negative.  One level 10 lymph node excised and 5 interlobar lymph nodes negative for carcinoma.  Right pleural fluid negative for malignant cells.\par Intraoperative course complicated by cardiogenic shock requiring inotropic support and diuretics.\par \par  [de-identified] : Moderately differentiated invasive acinar adenocarcinoma [de-identified] :  PD-L1 () TPS 0%. ALK negative.  EGFR mutation analysis–+ EGFR mutation ([id number], CTG>CGG). [de-identified] : Tires easily.Very anxious. No complaints of chest pain; no hemoptysis; +mild OGLESBY; no fevers.  No neurologic complaints.  No complaints of bone pain.  No GI complaints.\karlene Sees Dr. Yu regularly (Psych). Emotional support given.\karlene Has next cardiac eval. 1/3, and 1/4/23.\par Occasional small amount of nose bleeding. Remains on Plavix.\karlene Going to his place in [place] in January.\par \karlene Has had COVID vaccines (Moderna) x 4.

## 2022-11-30 NOTE — ADDENDUM
[FreeTextEntry1] : Called with results of labs notable for K 5.6, BUN/creat 34/1.16 and increase in serum pro BNP 3174 from 2440. WIll decrease the intake of K containing food and will repeat CMP to check K and LFTs that were mildly elevated. Pt will restart farxiga 5 mg daily.

## 2022-11-30 NOTE — ASSESSMENT
[FreeTextEntry1] : 77 year old Male with PMH of bipolar disorder, CAD/STEMI (5/2022) s/p R/LHC with IABP support which showed  mid %, LAD 70%, D1 70% and RA 15, PCWP 31, PA sat 59%, CO 3.63, CI 1.70, PVR 2.76; medical management recommended; TTE showed LV thrombus treated with Coumadin, HFrEF (EF 34%, LVIDd\par 6.1, moderate TR and severe PH), RUL mass s/p R. VATS (9/23/2022) c/b cardiogenic shock requiring inotropic support and diuretics.  TTE today with decrease in LVEF 22% from 34%\par Pertinent labs:\par AST 2768, ALT 1653 RHC: RA 15, PCWP 31, PA sat 59%, CO 3.63, CI 1.70, PVR\par 2.7.  VSat = 53.9% CO/CI 3.2/1.5 BNP decreased to 3,514 AST/ALT decreased to 351/652\par ACC/AHA Stage, NYHA Class II symptoms\par Appears compensated and normotensive with B/P 108/72 but home B/P range 92-96.60. Fatigue may be secondary to low flow.\par \par \par \par \par

## 2022-11-30 NOTE — PHYSICAL EXAM
[Well Nourished] : well nourished [No Acute Distress] : no acute distress [Normal Conjunctiva] : normal conjunctiva [Normal S1, S2] : normal S1, S2 [Soft] : abdomen soft [Non Tender] : non-tender [Normal Gait] : normal gait [No Edema] : no edema [Normal] : alert and oriented, normal memory [Alert and Oriented] : alert and oriented [Good Air Entry] : good air entry [No Respiratory Distress] : no respiratory distress  [No Rash] : no rash [de-identified] : JVP 8 cm  [de-identified] : left base crackles

## 2022-11-30 NOTE — ASSESSMENT
[FreeTextEntry1] : 10/17/2022 Thoracic surgery note, 10/13/22 cardiology note, CT scan report, PET/CT scan report, lung pathology reports reviewed.\par #1) Stage IB (pT2aN0) adenocarcinoma of the lung, moderately differentiated–status post right upper lobectomy/lymph node sampling.  Intraoperative course complicated by cardiogenic shock.  PD-L1 () TPS 0%. ALK negative.  EGFR mutation analysis–+ EGFR mutation ([id number], CTG>CGG).\par \par Per NCCN guidelines, may consider observation with expectant surveillance or chemotherapy for high risk patients and osimertinib (if EGFR exon 19 deletion or [id number]).  Examples of high risk features may include poorly differentiated tumors, vascular invasion, wedge resection, tumors greater than 4 cm, visceral pleural involvement or unknown lymph node status–these features do not apply in this case-in light of this and patient's comorbidities, favored expectant surveillance approach.  He has a follow-up CT scan of the chest scheduled in January per Dr. Padilla.\par Have discussed the EGFR mutation with potential implications in the future with treatment available if needed.\par \par #2) anemia/thrombocytosis-suspect antecedent surgery contributed. Intermittent mild epistaxis may continue to contribute. Hemoglobin improving , and platelet count normalized on most recent lab work available. Interval F/U lab work ordered. If refractory, can consider further evaluation.\par \par Patient was given the opportunity to ask questions.  His questions have been answered to his apparent satisfaction at this time.  He concurs with plans of care.\par

## 2022-11-30 NOTE — HISTORY OF PRESENT ILLNESS
[FreeTextEntry1] : 77 year old Male with PMH of bipolar disorder, CAD/STEMI (5/2022) s/p R/LHC with IABP support which showed  mid %, LAD 70%, D1 70% and RA 15, PCWP 31, PA sat 59%, CO 3.63, CI 1.70, PVR 2.76; medical management recommended; TTE showed LV thrombus treated with Coumadin (completed 8/26/22) , HFrEF (EF 34%, LVIDd6.1, moderate TR and severe PH with repeat 11/28/22 with decline in LVEF 22%), RUL mass s/p R. VATS (9/23/2022) c/b cardiogenic shock requiring inotropic support and diuretics. Pertinent labs:AST 2768, ALT 1653 RHC: RA 15, PCWP 31, PA sat 59%, CO 3.63, CI 1.70, PVR.2.7. Pt is here for a follow up after TTE. Accompanied by his wife. Card:Dr. Walden.\par \par Pt states he has been feeling a little better since last appt.  with the up titration of HF meds but still feels fatigued with after walking for 20 minutes or doing 1/2 lap in the pool which may be secondary to low flow. He is tolerating   Entresto to 49-51 mg bid, brenda 12.5 mg daily and  metoprolol to 25 mg bid. He ran out of Farxiga 5 mg daily this week after using the free 30 day coupon. States it will be expensive and is already payiing > $300 for other meds including Entresto since currently in the donut hole. \par \par States D/c weight was approx 174 lbs and he is 175 lbs at home and 179 lbs in office with clothes with B/P at home /64 and is 108/72 in office today with  HR 76 bpm. He is taking furosemide 40 mg daily with no additional doses. \par \par He followed up with CTSX 10/17/22 and will be having a CT scan 12/2022. He has some improvement in dry cough. He had a prior  CXR before d/c with right pleural effusion.  \par \par He had a viability study with reported left ventricle was normal in size. The mid to distal anterior, mid to distal anteroseptal, apical, and inferior walls do not demonstrate significant viability. The septum demonstrates mild viability. The remaining segments are viable (predominantly the lateral wall only). Discussed with interventional card Dr. Helms and opted to manage medically. Since there has been a decline in LVEF to 225, it was decided to repeat Cardiac viability to see if there were any areas that could be revascularized. \par \par Currently, he sleeps with 1 pillow with no orthopnea. He is adhering to a low salt diet and is drinking less than 2 liters of fluid per day\par He denies chest pain, palpitations, dizziness/LH and syncope and he does not have an ICD. \par Of note, he is  , non smoker, no ETOH, + family history of CAD in brothers; one with ICD and 3 V CABG, other with CHF, stents\par He will be going to [place] from 1/11-3/2/22. \par \par Patient name: ELYSE MALAVE\par YOB: 1945   Age: 77 (M)   MR#: [id number]\par Study Date: 11/28/2022\par Location: O/PSonographer: Venus Mcpherson RDCS\par Study quality: Technically good\par Referring Physician: VIPUL JEROME NP\par Blood Pressure: 98/62 mmHg\par Height: 183 cm\par Weight: 79 kg\par BSA: 2 m2\par ------------------------------------------------------------------------\par PROCEDURE: Transthoracic echocardiogram with 2-D, M-Mode\par and complete spectral and color flow Doppler.\par INDICATION: Heart failure, unspecified (I50.9)\par ------------------------------------------------------------------------\par DIMENSIONS:\par Dimensions:     Normal Values:\par LA:     4.6 cm    2.0 - 4.0 cm\par Ao:     3.5 cm    2.0 - 3.8 cm\par SEPTUM: 0.8 cm    0.6 - 1.2 cm\par PWT:    0.8 cm    0.6 - 1.1 cm\par LVIDd:  6.1 cm    3.0 - 5.6 cm\par LVIDs:  5.5 cm    1.8 - 4.0 cm\par Derived Variables:\par LVMI: 95 g/m2\par RWT: 0.26\par Fractional short: 10 %\par Ejection Fraction (Pizarro Rule): 22 %\par ------------------------------------------------------------------------\par OBSERVATIONS:\par Mitral Valve: Mitral annular calcification, otherwise\par normal mitral valve. Moderate mitral regurgitation.\par Aortic Root: Normal aortic root.\par Aortic Valve: Calcified trileaflet aortic valve with normal\par opening. Mild aortic regurgitation. \par Left Atrium: Severely dilated left atrium.  LA volume index\par = 49 cc/m2.\par Left Ventricle: Severe global left ventricular systolic\par dysfunction. Mild left ventricular enlargement. Moderate\par diastolic dysfunction (Stage II).\par Right Heart: Moderate right atrial enlargement. Right\par ventricular enlargement with decreased right ventricular\par systolic function. Normal tricuspid valve.  Mild-moderate\par tricuspid regurgitation. Normal pulmonic valve.  Mild\par pulmonic regurgitation.\par Pericardium/PleuraNormal pericardium with no pericardial\par effusion.\par Hemodynamic: Estimated right ventricular systolic pressure\par equals 62 mm Hg, assuming right atrial pressure equals 10\par mm Hg, consistent with severe pulmonary hypertension.\par ------------------------------------------------------------------------\par CONCLUSIONS:\par 1. Mitral annular calcification, otherwise normal mitral\par valve. Moderate mitral regurgitation.\par 2. Calcified trileaflet aortic valve with normal opening.\par Mild aortic regurgitation. \par 3. Severely dilated left atrium.  LA volume index = 49\par cc/m2.\par 4. Mild left ventricular enlargement.\par 5. Severe global left ventricular systolic dysfunction.\par 6. Moderate diastolic dysfunction (Stage II).\par 7. Moderate right atrial enlargement.\par 8. Right ventricular enlargement with decreased right\par ventricular systolic function.\par 9. Estimated right ventricular systolic pressure equals 62\par mm Hg, assuming right atrial pressure equals 10 mm Hg,\par consistent with severe pulmonary hypertension.\par *** Compared with echocardiogram of 9/24/2022, left\par ventricular systolic function has deteriorated.  The\par estimated pulmonary artery systolic pressure has decreased.\par ------------------------------------------------------------------------\par Confirmed on  11/28/2022 - 13:32:27 by Nick Mead M.D.,\par [facility], [facility]\par ------------------------------------------------------------------------\par \par \par Echocardiogram:\par Transthoracic Echocardiogram (09.24.22 @ 14:33)\par DIMENSIONS:\par Dimensions: Normal Values:\par LA: 4.1 cm 2.0 - 4.0 cm\par Ao: 3.7 cm 2.0 - 3.8 cm\par SEPTUM: 1.2 cm 0.6 - 1.2 cm\par PWT: 1.2 cm 0.6 - 1.1 cm\par LVIDd: 6.1 cm 3.0 - 5.6 cm\par LVIDs: --- 1.8 - 4.0 cm\par Derived Variables:\par LVMI: 161 g/m2\par RWT: 0.39\par Ejection Fraction (Modified Pizarro Rule): 34 %\par ------------------------------------------------------------------------\par OBSERVATIONS:\par Mitral Valve: Mitral annular calcification, otherwise\par normal mitral valve. Moderate mitral regurgitation.\par Aortic Root: Normal aortic root.\par Aortic Valve: Calcified trileaflet aortic valve with normal\par opening. Mild aortic regurgitation.\par Left Atrium: Normal left atrium. LA volume index = 28\par cc/m2.\par Left Ventricle: Severe global left ventricular systolic\par dysfunction. Severe left ventricular enlargement. Moderate\par diastolic dysfunction (Stage II).\par Right Heart: Normal right atrium. Right ventricular\par enlargement with normal right ventricular systolic\par function. Normal tricuspid valve. Moderate tricuspid\par regurgitation. Normal pulmonic valve. Mild pulmonic\par regurgitation.\par Pericardium/PleuraNormal pericardium with no pericardial\par effusion.\par Hemodynamic: Estimated right ventricular systolic pressure\par equals 82 mm Hg, assuming right atrial pressure equals 10\par mm Hg, consistent with severe pulmonary hypertension.\par ------------------------------------------------------------------------\par CONCLUSIONS:\par 1. Mitral annular calcification, otherwise normal mitral\par valve. Moderate mitral regurgitation.\par 2. Severe left ventricular enlargement.\par 3. Severe global left ventricularsystolic dysfunction.\par 4. Moderate diastolic dysfunction (Stage II).\par 5. Right ventricular enlargement with normal right\par ventricular systolic function.\par 6. Estimated pulmonary artery systolic pressure equals 82\par mm Hg, assuming right atrial pressureequals 10 mm Hg,\par consistent with severe pulmonary hypertension.\par \par  Cardiac Catheterization (05.11.22 @ 15:16) >\par Intra-aortic Balloon Pump\par An intra-aortic balloon pump was inserted.\par Diagnostic Findings:\par Coronary Angiography\par The coronary circulation is right dominant.\par LM\par Left main artery: Angiography shows no disease.\par LAD\par Proximal left anterior descending: There is a 70 % stenosis. First\par diagonal: There is a 70 % stenosis.\par Patient: ELYSE MALAVE MRN: [id number]\par Study Date: 05/11/2022 03:16 PM Page 1 of 4\par CX\par Circumflex: Angiography shows no disease.\par RCA\par Mid right coronary artery: There is a 100 % stenosis.\par Exam Start Time: 03:16 PM\par Exam End Time: 03:53 PM\par Exam Duration: 37 min\par Patient: ELYSE MALAVE MRN: [id number]\par Study Date: 05/11/2022 03:16 PM Page 2 of 4\par Hemodynamic Pressures:\par Phase Location [mmHg] [mmHg]\par [mmHg] HR\par Baseline LV s 115\par ed 36 88\par Baseline Ao s 126 d 85\par m 102 103\par Baseline RV s 50\par ed 17 81\par Baseline PA s 56 d 31\par m 41 81\par Baseline PCW a 32 v 34\par m 31 77\par Baseline RA a 20 v 16\par m 15 95\par \par Oxygen Saturations\par Phase Location Hb Sat pO2\par Content Group\par Baseline AO 14 99\par 19.33 SA\par Baseline PA 14 59\par 11.48 PA\par \par Oxygen Saturation Average, Phase: Baseline\par PV Hb PV Sat PV pO2\par PV Content\par SA Hb 14.40 g/dL SA Sat 98.70 % SA pO2\par SA Content 19.33 %\par PA Hb 14.40 g/dL PA Sat 58.60 % PA pO2\par PA Content 11.48 %\par MV Hb MV Sat MV pO2\par MV Content\par Strokework:\par Phase: Baseline\par LVSW: 34.02 g*m LVSW (index):\par 15.96 g*m/m2\par RVSW: 15.84 g*m RVSW (index):\par 7.43 g*m/m2\par Flow Calculations, Phase: Baseline\par CO 3.63 l/min HR\par O2Insp\par CI 1.70 l/min/m2 PO2\par O2Exp\par SV VO2 285.00 ml/min\par O2(ExAir)\par SVI A-VO2\par Hb 14.40 gm/d\par Shunts:\par Qs 3.63 l/min Qs Index 1.70\par l/min/m2\par Qp 3.63 l/min Qp Index 1.70\par l/min/m2 Qp/Qs\par EPBF EPBF Index\par L-R L-R Index\par R-L R-L Index\par Systemic Resistances, Phase: Baseline\par SVR 1917.35 dyn*s/cm5 TVR\par 2247.93 dyn*s/cm5\par \par Patient: ELYSE MALAVE MRN: [id number]\par Study Date: 05/11/2022 03:16PM Page 3 of 4\par \par \par \par \par SVRI 4086.28 dyn*s*m2/cm5 TVRI 4790.81 dyn*s*m2/cm5\par SVR 23.97 MERCADO TVR\par 28.11 MERCADO\par SVRI 51.09 MERCADO*m2 TVRI\par 59.90 MERCADO*m2\par Pulmonary Resistances:\par .39 dyn*s/cm5 TPR\par 903.58 dyn*s/cm5\par PVRI 469.69 dyn*s*m2/cm5 TPRI\par 1925.72 dyn*s*m2/cm5\par PVR 2.76 MERCADO TPR\par 11.30 MERCADO\par PVRI 5.87 MERCADO*m2 TPRI\par 24.08 MERCADO*m2\par Ratios:\par PVR-SVR 0.11 TPR-TVR\par 0.4\par Shunts:\par venO2 CO\par R-L Shunt R-L Shunt\par Sammy. Method\par L-R Shunt L-R Shunt\par R-L Shunt R-L Shunt\par Sammy. Method\par \par Conscious sedation was administered and monitored by Cardiology\par nursing staff,\par under my direct supervision when applicable.\par \par Electronically signed by Stew Helms MD on 05/11/2022 at 05:17 PM\par \par \par \par \par Cardiac Viability (05.12.22 @ 15:17)\par NUCLEAR FINDINGS:\par The left ventricle was normal in size. The mid to distal\par anterior, mid to distalanteroseptal, apical, and inferior\par walls do not demonstrate significant viability. The\par septum demonstrates mild viability. The remaining segments\par are viable (predominantly the lateral wall only).\par ------------------------------------------------------------------------\par GATED ANALYSIS:\par Rest gated wall motion analysis was performed (LVEF = 29\par %;LVEDV = 205 ml.), revealing markedly reduced LV\par function with regional variation.\par ------------------------------------------------------------------------\par IMPRESSIONS:Abnormal Study\par * The left ventricle was normal in size. The mid to distal\par anterior, mid to distal anteroseptal, apical, and inferior\par walls do not demonstrate significant viability. The\par septum demonstrates mild viability. Theremaining segments\par are viable (predominantly the lateral wall only).\par * Rest gated wall motion analysis was performed (LVEF = 29\par %;LVEDV = 205 ml.), revealing markedly reduced LV\par function with regional variation.\par Conclusion:\par The left ventricle was normal in size. The mid to distal\par anterior, mid to distal anteroseptal, apical, and inferior\par walls do not demonstrate significant viability. The\par septum demonstrates mild viability. The remaining segments\par are viable (predominantly the lateral wall only).\par ------------------------------------------------------------------------\par \par \par \par \par Assessment and Plan:\par - Assessment \par 77 year old Male with PMH of bipolar disorder, CAD/STEMI (5/2022) s/p R/LHC\par with IABP support which showed  mid %, LAD 70%, D1 70% and RA 15,\par PCWP 31, PA sat 59%, CO 3.63, CI 1.70, PVR 2.76; medical management\par recommended; TTE showed LV thrombus treated with Coumadin, HFrEF (EF 34%, LVIDd\par 6.1, moderate TR and severe PH), RUL mass s/p R. VATS (9/23/2022) c/b\par cardiogenic shock requiring inotropic support and diuretics. Pertinent labs:\par AST 2768, ALT 1653 RHC: RA 15, PCWP 31, PA sat 59%, CO 3.63, CI 1.70, PVR\par 2.76.\par 9/28 Dobutamine weaned off and hemodynamically stable\par 9/29 Lactate = 1.3 and VSat = 53.9% CO/CI 3.2/1.5\par BNP decreased to 3,514 AST/ALT decreased to 351/652\par \par \par \par \par \par Problem/Plan - \par Attestation Statements:\par Attestation Statements:\par Attending and PA/NP shared services statement (NON-critical care):\par \par Attending to bill.\par \par I independently performed the documented:\par \par Medical decision making.\par \par \par \par                      \par \par PROCEDURE DATE:  10/01/2022  CXR\par INTERPRETATION:  INDICATION: S/P VATS\par COMPARISON: 9/30/22\par Technique: AP radiograph of the chest\par FINDINGS:\par S/P removal of right IJ line.\par Heart/Vascular: Stable mild cardiomegaly.\par Pulmonary: New, small pleural effusion. Remainder of the lungs are clear. \par No focal infiltrate. No pneumothorax\par Bones: No acute bony finding\par Impression:\par New, small right pleural effusion.\par \par \par \par

## 2022-11-30 NOTE — CARDIOLOGY SUMMARY
[de-identified] : 11/28/22 NSR with 1 st degree AVB 70. LAFB, PRWP, NSST\par 10/27/22  NSR 71,  LAFB, PRWP, NSST \par 10/13/22 NSR 98, LAFB, PRWP, NSST with PVC's [de-identified] : 11/28/22 TTE; LVEF 22%, LVIDD 6.1 cm, mod MR, severe global LV systolic dysfunction, moderate diastolic dysfunction Stage II, with decreased RV systolic function, severe pHTN\par \par 9/24/22 TTE; LVEF 34%, LVIDD 6.1 cm, mod MR, mild AR, normal LA, severe global LV systolic dysfunction, moderate diastolic dysfunction Stage II, normal RV systolic function. mod TR, RV systolic function 82 mmHg c/q severe pHTN [de-identified] : The left ventricle was normal in size. The mid to distal\par anterior, mid to distal anteroseptal, apical, and inferior\par walls do not demonstrate significant viability. The\par septum demonstrates mild viability. The remaining segments\par are viable (predominantly the lateral wall only). [de-identified] : 9/2022 RHC: RA 15, PCWP 31, PA sat 59%, CO 3.63, CI 1.70, PVR 2.76.\par 9/2022:  s/p R/LHC with IABP support which showed  mid %, LAD 70%, D1 70% and RA 15,\par PCWP 31, PA sat 59%, CO 3.63, CI 1.70, PVR 2.76; medical management

## 2022-12-13 ENCOUNTER — NON-APPOINTMENT (OUTPATIENT)
Age: 77
End: 2022-12-13

## 2022-12-17 NOTE — HISTORY OF PRESENT ILLNESS
[FreeTextEntry1] : 77 year old male with complex cardiac history including HTN, HLD, CAD s/p cardiogenic shock presentation in 05/22 with RCA  and tight lesions in LAD and diag.  Pt sent for viability scan revealing no viability in anterior, apical or inferior walls and was treated for CHF medically.  Pt doing well now without any further CP, SOB or h/A

## 2022-12-17 NOTE — DISCUSSION/SUMMARY
[FreeTextEntry1] : HTN- controlled\par \par HLD- on statin and stable\par \par CHF- has stabilized medically.  Given viability scan, there is likely little value in potentially revasc the LAd but will discuss with Dr Shell.  Cont current meds\par \par CAD- no CP sx\par \par Followup with [facility] HF team\par \par Time spent reviewing complex history, charts, cath films, exam, pt discussion and note writing [EKG obtained to assist in diagnosis and management of assessed problem(s)] : EKG obtained to assist in diagnosis and management of assessed problem(s)

## 2022-12-27 ENCOUNTER — APPOINTMENT (OUTPATIENT)
Dept: CT IMAGING | Facility: CLINIC | Age: 77
End: 2022-12-27
Payer: MEDICARE

## 2022-12-27 ENCOUNTER — OUTPATIENT (OUTPATIENT)
Dept: OUTPATIENT SERVICES | Facility: HOSPITAL | Age: 77
LOS: 1 days | End: 2022-12-27
Payer: MEDICARE

## 2022-12-27 DIAGNOSIS — C34.91 MALIGNANT NEOPLASM OF UNSPECIFIED PART OF RIGHT BRONCHUS OR LUNG: ICD-10-CM

## 2022-12-27 DIAGNOSIS — Z98.890 OTHER SPECIFIED POSTPROCEDURAL STATES: Chronic | ICD-10-CM

## 2022-12-27 PROCEDURE — 71250 CT THORAX DX C-: CPT

## 2022-12-27 PROCEDURE — 71250 CT THORAX DX C-: CPT | Mod: 26,MH

## 2023-01-03 ENCOUNTER — NON-APPOINTMENT (OUTPATIENT)
Age: 78
End: 2023-01-03

## 2023-01-03 ENCOUNTER — INPATIENT (INPATIENT)
Facility: HOSPITAL | Age: 78
LOS: 6 days | Discharge: HOME HEALTH | DRG: 291 | End: 2023-01-10
Attending: HOSPITALIST | Admitting: STUDENT IN AN ORGANIZED HEALTH CARE EDUCATION/TRAINING PROGRAM
Payer: MEDICARE

## 2023-01-03 ENCOUNTER — APPOINTMENT (OUTPATIENT)
Dept: CARDIOLOGY | Facility: CLINIC | Age: 78
End: 2023-01-03
Payer: MEDICARE

## 2023-01-03 ENCOUNTER — APPOINTMENT (OUTPATIENT)
Dept: CV DIAGNOSTICS | Facility: HOSPITAL | Age: 78
End: 2023-01-03

## 2023-01-03 VITALS — SYSTOLIC BLOOD PRESSURE: 90 MMHG | DIASTOLIC BLOOD PRESSURE: 66 MMHG | HEART RATE: 72 BPM

## 2023-01-03 VITALS
RESPIRATION RATE: 20 BRPM | DIASTOLIC BLOOD PRESSURE: 60 MMHG | HEIGHT: 72 IN | WEIGHT: 175.05 LBS | OXYGEN SATURATION: 99 % | SYSTOLIC BLOOD PRESSURE: 86 MMHG | HEART RATE: 37 BPM | TEMPERATURE: 96 F

## 2023-01-03 VITALS
HEART RATE: 80 BPM | HEIGHT: 72 IN | BODY MASS INDEX: 23.7 KG/M2 | WEIGHT: 175 LBS | RESPIRATION RATE: 16 BRPM | OXYGEN SATURATION: 97 %

## 2023-01-03 DIAGNOSIS — R06.00 DYSPNEA, UNSPECIFIED: ICD-10-CM

## 2023-01-03 DIAGNOSIS — Z98.890 OTHER SPECIFIED POSTPROCEDURAL STATES: Chronic | ICD-10-CM

## 2023-01-03 DIAGNOSIS — R06.02 SHORTNESS OF BREATH: ICD-10-CM

## 2023-01-03 LAB
ALBUMIN SERPL ELPH-MCNC: 4 G/DL — SIGNIFICANT CHANGE UP (ref 3.3–5)
ALP SERPL-CCNC: 163 U/L — HIGH (ref 40–120)
ALT FLD-CCNC: 38 U/L — SIGNIFICANT CHANGE UP (ref 10–45)
ANION GAP SERPL CALC-SCNC: 16 MMOL/L — SIGNIFICANT CHANGE UP (ref 5–17)
APPEARANCE UR: CLEAR — SIGNIFICANT CHANGE UP
APTT BLD: 29.5 SEC — SIGNIFICANT CHANGE UP (ref 27.5–35.5)
AST SERPL-CCNC: 51 U/L — HIGH (ref 10–40)
BACTERIA # UR AUTO: NEGATIVE — SIGNIFICANT CHANGE UP
BASE EXCESS BLDV CALC-SCNC: -5.7 MMOL/L — LOW (ref -2–3)
BASOPHILS # BLD AUTO: 0.06 K/UL — SIGNIFICANT CHANGE UP (ref 0–0.2)
BASOPHILS NFR BLD AUTO: 0.9 % — SIGNIFICANT CHANGE UP (ref 0–2)
BILIRUB SERPL-MCNC: 1.3 MG/DL — HIGH (ref 0.2–1.2)
BILIRUB UR-MCNC: NEGATIVE — SIGNIFICANT CHANGE UP
BUN SERPL-MCNC: 52 MG/DL — HIGH (ref 7–23)
CA-I SERPL-SCNC: 1.27 MMOL/L — SIGNIFICANT CHANGE UP (ref 1.15–1.33)
CALCIUM SERPL-MCNC: 9.7 MG/DL — SIGNIFICANT CHANGE UP (ref 8.4–10.5)
CHLORIDE BLDV-SCNC: 104 MMOL/L — SIGNIFICANT CHANGE UP (ref 96–108)
CHLORIDE SERPL-SCNC: 105 MMOL/L — SIGNIFICANT CHANGE UP (ref 96–108)
CO2 BLDV-SCNC: 22 MMOL/L — SIGNIFICANT CHANGE UP (ref 22–26)
CO2 SERPL-SCNC: 18 MMOL/L — LOW (ref 22–31)
COLOR SPEC: YELLOW — SIGNIFICANT CHANGE UP
CREAT SERPL-MCNC: 1.77 MG/DL — HIGH (ref 0.5–1.3)
DIFF PNL FLD: NEGATIVE — SIGNIFICANT CHANGE UP
EGFR: 39 ML/MIN/1.73M2 — LOW
EOSINOPHIL # BLD AUTO: 0.09 K/UL — SIGNIFICANT CHANGE UP (ref 0–0.5)
EOSINOPHIL NFR BLD AUTO: 1.4 % — SIGNIFICANT CHANGE UP (ref 0–6)
EPI CELLS # UR: 3 /HPF — SIGNIFICANT CHANGE UP
GAS PNL BLDV: 136 MMOL/L — SIGNIFICANT CHANGE UP (ref 136–145)
GAS PNL BLDV: SIGNIFICANT CHANGE UP
GAS PNL BLDV: SIGNIFICANT CHANGE UP
GLUCOSE BLDV-MCNC: 114 MG/DL — HIGH (ref 70–99)
GLUCOSE SERPL-MCNC: 109 MG/DL — HIGH (ref 70–99)
GLUCOSE UR QL: ABNORMAL
HCO3 BLDV-SCNC: 21 MMOL/L — LOW (ref 22–29)
HCT VFR BLD CALC: 41.2 % — SIGNIFICANT CHANGE UP (ref 39–50)
HCT VFR BLDA CALC: 39 % — SIGNIFICANT CHANGE UP (ref 39–51)
HGB BLD CALC-MCNC: 13.1 G/DL — SIGNIFICANT CHANGE UP (ref 12.6–17.4)
HGB BLD-MCNC: 12.8 G/DL — LOW (ref 13–17)
HYALINE CASTS # UR AUTO: 18 /LPF — HIGH (ref 0–2)
IMM GRANULOCYTES NFR BLD AUTO: 0.2 % — SIGNIFICANT CHANGE UP (ref 0–0.9)
INR BLD: 1.34 RATIO — HIGH (ref 0.88–1.16)
KETONES UR-MCNC: NEGATIVE — SIGNIFICANT CHANGE UP
LACTATE BLDV-MCNC: 2.5 MMOL/L — HIGH (ref 0.5–2)
LEUKOCYTE ESTERASE UR-ACNC: NEGATIVE — SIGNIFICANT CHANGE UP
LIDOCAIN IGE QN: 32 U/L — SIGNIFICANT CHANGE UP (ref 7–60)
LYMPHOCYTES # BLD AUTO: 1.28 K/UL — SIGNIFICANT CHANGE UP (ref 1–3.3)
LYMPHOCYTES # BLD AUTO: 20.1 % — SIGNIFICANT CHANGE UP (ref 13–44)
MAGNESIUM SERPL-MCNC: 2.4 MG/DL — SIGNIFICANT CHANGE UP (ref 1.6–2.6)
MCHC RBC-ENTMCNC: 29.1 PG — SIGNIFICANT CHANGE UP (ref 27–34)
MCHC RBC-ENTMCNC: 31.1 GM/DL — LOW (ref 32–36)
MCV RBC AUTO: 93.6 FL — SIGNIFICANT CHANGE UP (ref 80–100)
MONOCYTES # BLD AUTO: 0.88 K/UL — SIGNIFICANT CHANGE UP (ref 0–0.9)
MONOCYTES NFR BLD AUTO: 13.8 % — SIGNIFICANT CHANGE UP (ref 2–14)
NEUTROPHILS # BLD AUTO: 4.05 K/UL — SIGNIFICANT CHANGE UP (ref 1.8–7.4)
NEUTROPHILS NFR BLD AUTO: 63.6 % — SIGNIFICANT CHANGE UP (ref 43–77)
NITRITE UR-MCNC: NEGATIVE — SIGNIFICANT CHANGE UP
NRBC # BLD: 0 /100 WBCS — SIGNIFICANT CHANGE UP (ref 0–0)
NT-PROBNP SERPL-SCNC: 5606 PG/ML — HIGH (ref 0–300)
PCO2 BLDV: 43 MMHG — SIGNIFICANT CHANGE UP (ref 42–55)
PH BLDV: 7.29 — LOW (ref 7.32–7.43)
PH UR: 5 — SIGNIFICANT CHANGE UP (ref 5–8)
PLATELET # BLD AUTO: 191 K/UL — SIGNIFICANT CHANGE UP (ref 150–400)
PO2 BLDV: 15 MMHG — LOW (ref 25–45)
POTASSIUM BLDV-SCNC: 4.9 MMOL/L — SIGNIFICANT CHANGE UP (ref 3.5–5.1)
POTASSIUM SERPL-MCNC: 4.7 MMOL/L — SIGNIFICANT CHANGE UP (ref 3.5–5.3)
POTASSIUM SERPL-SCNC: 4.7 MMOL/L — SIGNIFICANT CHANGE UP (ref 3.5–5.3)
PROT SERPL-MCNC: 7.4 G/DL — SIGNIFICANT CHANGE UP (ref 6–8.3)
PROT UR-MCNC: ABNORMAL
PROTHROM AB SERPL-ACNC: 15.6 SEC — HIGH (ref 10.5–13.4)
RAPID RVP RESULT: DETECTED
RBC # BLD: 4.4 M/UL — SIGNIFICANT CHANGE UP (ref 4.2–5.8)
RBC # FLD: 17.6 % — HIGH (ref 10.3–14.5)
RBC CASTS # UR COMP ASSIST: 1 /HPF — SIGNIFICANT CHANGE UP (ref 0–4)
RV+EV RNA SPEC QL NAA+PROBE: DETECTED
SAO2 % BLDV: 10.2 % — LOW (ref 67–88)
SARS-COV-2 RNA SPEC QL NAA+PROBE: SIGNIFICANT CHANGE UP
SODIUM SERPL-SCNC: 139 MMOL/L — SIGNIFICANT CHANGE UP (ref 135–145)
SP GR SPEC: 1.02 — SIGNIFICANT CHANGE UP (ref 1.01–1.02)
TROPONIN T, HIGH SENSITIVITY RESULT: 45 NG/L — SIGNIFICANT CHANGE UP (ref 0–51)
TROPONIN T, HIGH SENSITIVITY RESULT: 52 NG/L — HIGH (ref 0–51)
TSH SERPL-MCNC: 7.02 UIU/ML — HIGH (ref 0.27–4.2)
UROBILINOGEN FLD QL: NEGATIVE — SIGNIFICANT CHANGE UP
WBC # BLD: 6.37 K/UL — SIGNIFICANT CHANGE UP (ref 3.8–10.5)
WBC # FLD AUTO: 6.37 K/UL — SIGNIFICANT CHANGE UP (ref 3.8–10.5)
WBC UR QL: 3 /HPF — SIGNIFICANT CHANGE UP (ref 0–5)

## 2023-01-03 PROCEDURE — 99215 OFFICE O/P EST HI 40 MIN: CPT

## 2023-01-03 PROCEDURE — 71275 CT ANGIOGRAPHY CHEST: CPT | Mod: 26,QQ

## 2023-01-03 PROCEDURE — 71045 X-RAY EXAM CHEST 1 VIEW: CPT | Mod: 26

## 2023-01-03 PROCEDURE — 93308 TTE F-UP OR LMTD: CPT | Mod: 26

## 2023-01-03 PROCEDURE — 93000 ELECTROCARDIOGRAM COMPLETE: CPT | Mod: PD

## 2023-01-03 PROCEDURE — 99285 EMERGENCY DEPT VISIT HI MDM: CPT | Mod: CS

## 2023-01-03 RX ORDER — SODIUM CHLORIDE 9 MG/ML
250 INJECTION INTRAMUSCULAR; INTRAVENOUS; SUBCUTANEOUS ONCE
Refills: 0 | Status: DISCONTINUED | OUTPATIENT
Start: 2023-01-03 | End: 2023-01-03

## 2023-01-03 RX ORDER — ASPIRIN/CALCIUM CARB/MAGNESIUM 324 MG
162 TABLET ORAL ONCE
Refills: 0 | Status: COMPLETED | OUTPATIENT
Start: 2023-01-03 | End: 2023-01-03

## 2023-01-03 RX ADMIN — Medication 162 MILLIGRAM(S): at 17:31

## 2023-01-03 NOTE — ASSESSMENT
[facility] Dermatology Note  Encounter Date: Oct 19, 2021  Office Visit     Dermatology Problem List:  #. Tinea pedis with onychomycosis  - terbinafine  - labs 10/19/2021   #. NUB, right lateral back. Shave 10/19/2021   # skin cancer screening.   ____________________________________________    Assessment & Plan:    #. NUB, right lateral back. Ddx ISK vs pigmented BCC vs other  - Shave biopsy of lateral portion of the lesion performed today, see note below.    #. Tinea pedis with onychomycosis. Discussed treatment options, including topicals and oral medications as well as patient's need to clean or replace his shoes to prevent fungus recurrence.  - Start terbinafine 250 mg daily x6 weeks   - Labs ordered; CBC, CMP    #. Benign lesions: Multiple benign nevi, solar lentigos, seborrheic keratoses, cherry angiomas. Explained to patient benign nature of lesion. No treatment is necessary at this time unless the lesion changes or becomes symptomatic.   - ABCDs of melanoma were discussed and self skin checks were advised.  - Sun precaution was advised including the use of sun screens of SPF 30 or higher, sun protective clothing, and avoidance of tanning beds.     Procedures Performed:   - Shave biopsy procedure note, location(s): see above. After discussion of benefits and risks including but not limited to bleeding, infection, scar, incomplete removal, recurrence, and non-diagnostic biopsy, written consent and photographs were obtained. The area was cleaned with isopropyl alcohol. 0.5mL of 1% lidocaine with epinephrine was injected to obtain adequate anesthesia of lesion(s). Shave biopsy at site(s) performed. Hemostasis was achieved with aluminium chloride. Petrolatum ointment and a sterile dressing were applied. The patient tolerated the procedure and no complications were noted. The patient was provided with verbal and written post care instructions.     Follow-up: 6 weeks     Staff and Scribe:  [FreeTextEntry1] : In summary, the patient is a 77-year-old man with extremely poor left ventricular systolic dysfunction on appropriate medical therapy who is getting worsening symptomatology including paroxysmal nocturnal dyspnea.\par \par Given the fact that the patient has so little reserve and given the fact that his cardiac meds are essentially optimized have advised immediate hospitalization.  He will present to [facility] and be assessed by the heart failure team     Provider Disclosure:   The documentation recorded by the scribe accurately reflects the services I personally performed and the decisions made by me.    All risks, benefits and alternatives were discussed with patient.  Patient is in agreement and understands the assessment and plan.  All questions were answered.  Sun Screen Education was given.   Return to Clinic in 6 weeks or sooner as needed.   Tanvi Mix PA-C   [facility]     Scribe Disclosure:  I, Karen Dameon, am serving as a scribe to document services personally performed by Tanvi Mix PA-C based on data collection and the provider's statements to me.  ____________________________________________    CC: Derm Problem (FBS, concerned about moles on back and chest toenail fungus)      HPI:  Mr. Ayo Buckner is a(n) 71 year old male who presents today as a new patient for FBSE.    Today, he reports several spots of concern on the back and a few on his chest. He also states that he has had fungus on his feet and toenails for several years now and would like to discuss treatment options today.    The patient denies personal or family history of skin cancer or other skin disorders. The patient endorses modest sun exposure throughout his life and otherwise denies significant or blistering sunburns as well as tanning bed use.    Patient is otherwise feeling well, without additional skin concerns.    Labs Reviewed:  Lab Results   Component Value Date    WBC 7.3 10/19/2021    WBC 6.0 06/01/2018     Lab Results   Component Value Date    RBC 4.53 10/19/2021    RBC 4.35 06/01/2018     Lab Results   Component Value Date    HGB 15.2 10/19/2021    HGB 15.0 12/26/2019     Lab Results   Component Value Date    HCT 45.6 10/19/2021    HCT 44.1 06/01/2018     No components found for: MCT  Lab Results   Component Value Date     10/19/2021     06/01/2018     Lab Results   Component Value Date    MCH 33.6  10/19/2021    MCH 33.3 06/01/2018     Lab Results   Component Value Date    MCHC 33.3 10/19/2021    MCHC 32.9 06/01/2018     Lab Results   Component Value Date    RDW 12.8 10/19/2021    RDW 12.7 06/01/2018     Lab Results   Component Value Date     10/19/2021     06/01/2018     Last Comprehensive Metabolic Panel:  Sodium   Date Value Ref Range Status   10/19/2021 139 133 - 144 mmol/L Preliminary   11/19/2018 141 133 - 144 mmol/L Final     Potassium   Date Value Ref Range Status   10/19/2021 4.0 3.4 - 5.3 mmol/L Preliminary   11/19/2018 3.8 3.4 - 5.3 mmol/L Final     Chloride   Date Value Ref Range Status   10/19/2021 106 94 - 109 mmol/L Preliminary   11/19/2018 104 94 - 109 mmol/L Final     Carbon Dioxide   Date Value Ref Range Status   11/19/2018 31 20 - 32 mmol/L Final     Carbon Dioxide (CO2)   Date Value Ref Range Status   08/31/2021 28 20 - 32 mmol/L Final     Anion Gap   Date Value Ref Range Status   08/31/2021 5 3 - 14 mmol/L Final   11/19/2018 6 3 - 14 mmol/L Final     Glucose   Date Value Ref Range Status   08/31/2021 94 70 - 99 mg/dL Final   12/26/2019 92 70 - 99 mg/dL Final     Urea Nitrogen   Date Value Ref Range Status   08/31/2021 12 7 - 30 mg/dL Final   11/19/2018 16 7 - 30 mg/dL Final     Creatinine   Date Value Ref Range Status   08/31/2021 1.09 0.66 - 1.25 mg/dL Final   11/19/2018 0.96 0.66 - 1.25 mg/dL Final     GFR Estimate   Date Value Ref Range Status   08/31/2021 68 >60 mL/min/1.73m2 Final     Comment:     As of July 11, 2021, eGFR is calculated by the CKD-EPI creatinine equation, without race adjustment. eGFR can be influenced by muscle mass, exercise, and diet. The reported eGFR is an estimation only and is only applicable if the renal function is stable.   11/19/2018 78 >60 mL/min/1.7m2 Final     Comment:     Non  GFR Calc     Calcium   Date Value Ref Range Status   08/31/2021 8.6 8.5 - 10.1 mg/dL Final   11/19/2018 8.6 8.5 - 10.1 mg/dL Final     Bilirubin  Total   Date Value Ref Range Status   08/31/2021 0.9 0.2 - 1.3 mg/dL Final   10/14/2015 1.4 (H) 0.2 - 1.3 mg/dL Final     Alkaline Phosphatase   Date Value Ref Range Status   08/31/2021 54 40 - 150 U/L Final   10/14/2015 62 40 - 150 U/L Final     ALT   Date Value Ref Range Status   08/31/2021 18 0 - 70 U/L Final   10/14/2015 17 0 - 70 U/L Final     AST   Date Value Ref Range Status   08/31/2021 10 0 - 45 U/L Final   10/14/2015 9 0 - 45 U/L Final               Physical Exam:  Vitals: /62   SKIN: Full skin, which includes the head/face, both arms, chest, back, abdomen,both legs, genitalia and/or groin buttocks, digits and/or nails, was examined.  - Right lateral back 5.0 cm x 1.0 cm pink and brown scaly plaque  - There are pink scaly plaques to bilateral feet in a moccasin like distribution and interdigital scale. Toenail plates are yellow,hypertrophic with subungual debris.   - There are dome shaped bright red papules on the trunk and extremities.   - Multiple regular brown pigmented macules and papules are identified on the trunk and extermities.   - Scattered brown macules on sun exposed areas.  - There are waxy stuck on tan to brown papules on the trunk and extremities.   - No other lesions of concern on areas examined.     Medications:  Current Outpatient Medications   Medication     timolol maleate (TIMOPTIC) 0.5 % ophthalmic solution     No current facility-administered medications for this visit.      Past Medical History:   Patient Active Problem List   Diagnosis     Advanced directives, counseling/discussion     CARDIOVASCULAR SCREENING; LDL GOAL LESS THAN 160     Hoarseness     Past Medical History:   Diagnosis Date     Glaucoma      Radius shaft fracture     left       CC No referring provider defined for this encounter. on close of this encounter.

## 2023-01-03 NOTE — ED PROVIDER NOTE - PHYSICAL EXAMINATION
GEN - In mild distress, A&Ox3  HEAD - NC/AT  EYES - PERRL, EOMI  ENT - Airway patent, mucous membranes moist  PULMONARY - CTA b/l, decreased BS in RLL, no W/R/R  CARDIAC - +S1S2, RRR, no M/G/R, no JVD  ABDOMEN - ND, NT, soft, no guarding, no rebound, no masses, no rigidity   - No CVA TTP, no suprapubic TTP  EXTREMITIES - No LE edema b/l, 2+ radial and DP pulses b/l  SKIN - No rash or bruising  NEUROLOGIC - Alert, speech clear, no focal deficits  PSYCH - Normal mood/affect, normal insight

## 2023-01-03 NOTE — REASON FOR VISIT
[Cardiac Failure] : cardiac failure [FreeTextEntry1] : Patient presents for urgent assessment today.  He is normally seen by Dr. Geller by the heart failure team this is my first encounter with him.  He states for the last several days he has been short of breath walking up just 1 flight of stairs in his house and is waking up multiple times from sleep because he is short of breath and has to sit bolt right up in bed.  The patient for the most part has been taking his medication save for a recent switch from Entresto to losartan due to cost.

## 2023-01-03 NOTE — ED PROVIDER NOTE - CLINICAL SUMMARY MEDICAL DECISION MAKING FREE TEXT BOX
Chari: 77 year old male with pmhx of depression, bipoloar, stemi, cardiogenic shock severely depressed ejection fraction, lung cancer s/p right partial lobectomy here with sob x 1 week, worsening with exertion and worse today while going up a flight of stairs. Patient denies fever. no chills. no cp.  no recent travel.  normal bp 90/60. decrased BS on right lower lung. + pitting edema + 1 b/l le. will get labs, echo, cxr, cta chest, ekg, admit for further eval. r/o chf versus pe versus worsening EF. Chari: 77 year old male with pmhx of depression, bipoloar, stemi, cardiogenic shock severely depressed ejection fraction, lung cancer s/p right partial lobectomy here with sob x 1 week, worsening with exertion and worse today while going up a flight of stairs. Patient denies fever. no chills. no cp.  no recent travel.  normal bp 90/60. decreased BS on right lower lung. + pitting edema + 1 b/l le. will get labs, echo, cxr, cta chest, ekg, admit for further eval. r/o chf versus pe versus worsening EF.

## 2023-01-03 NOTE — ED ADULT NURSE NOTE - OBJECTIVE STATEMENT
77Y male, A&Ox4, pmh of CHF, MI (in may on Plavix), lobectomy of the right lung. pt presents to the ED c/o sob. in triage pt osvaldo to 38 and hypotensive at 86/60. pt states his bp normally is around 90's/60's. on exam pt HR was 71 with a BP of 90/75. pt states this morning he walked upstairs to his office, after walking up to his office pt states he was SOB and was unable to come back down stairs. pt wife at bedside stats that throughout the day he has been intermittently SOB and due to his PMH they wanted to come into the ED for eval. pt denies f/n/v/d, headache, vision changes, back pain, chest pain, abd pain, numbness/tinging, chills. 18g iv placed in right AC. wife at [facility]

## 2023-01-03 NOTE — ED PROVIDER NOTE - OBJECTIVE STATEMENT
The patient is a 77y Male with pmhx of bipolar disorder, BPH, SCC of leg, CHF EF 25% severe segmental LV systolic dysfunction, CAD, hospitalized on May 2022 for acute MI s/p cardiac Cath with no intervention (revealed  RCA and 70% occlusion prox LAD, 1st diagonal 70% stenosis- medical management-no intervention, IABP used for elevated right sided pressures; cardiogenic shock, found to have new 1x 1.3cm LV thrombus on Plavix and Coumadin (Coumadin completed on 08/26/2022), prior CXR showed RUL nodule, PET/CT showed FDG avid RUL mass, now s/p FB, R VATS, R Upper Lobectomy on 09/23/22 p/w sob and OGLESBY for the past 7-10 days. In triage, pt was osvaldo to 38 and hypotensive at 86/60. Pt states his BP is normally around 90s/60s. Pt states that this morning, he walked upstairs to his office and became acutely SOB, was unable to come back down the stairs. Pt wife is at bedside, states that throughout the day, he has been intermittently SOB. Denies fevers, chills, n/v/d, headache, vision changes, back pain, chest pain, abd pain, numbness/tinging, leg swelling, urinary symptoms. NKDA.    PCP: Dr. Niles Walden

## 2023-01-04 ENCOUNTER — NON-APPOINTMENT (OUTPATIENT)
Age: 78
End: 2023-01-04

## 2023-01-04 ENCOUNTER — APPOINTMENT (OUTPATIENT)
Dept: CV DIAGNOSTICS | Facility: HOSPITAL | Age: 78
End: 2023-01-04

## 2023-01-04 DIAGNOSIS — F33.9 MAJOR DEPRESSIVE DISORDER, RECURRENT, UNSPECIFIED: ICD-10-CM

## 2023-01-04 DIAGNOSIS — I25.10 ATHEROSCLEROTIC HEART DISEASE OF NATIVE CORONARY ARTERY WITHOUT ANGINA PECTORIS: ICD-10-CM

## 2023-01-04 DIAGNOSIS — Z29.9 ENCOUNTER FOR PROPHYLACTIC MEASURES, UNSPECIFIED: ICD-10-CM

## 2023-01-04 DIAGNOSIS — R91.1 SOLITARY PULMONARY NODULE: ICD-10-CM

## 2023-01-04 DIAGNOSIS — E03.9 HYPOTHYROIDISM, UNSPECIFIED: ICD-10-CM

## 2023-01-04 DIAGNOSIS — J90 PLEURAL EFFUSION, NOT ELSEWHERE CLASSIFIED: ICD-10-CM

## 2023-01-04 DIAGNOSIS — I34.0 NONRHEUMATIC MITRAL (VALVE) INSUFFICIENCY: ICD-10-CM

## 2023-01-04 DIAGNOSIS — B34.8 OTHER VIRAL INFECTIONS OF UNSPECIFIED SITE: ICD-10-CM

## 2023-01-04 DIAGNOSIS — R06.02 SHORTNESS OF BREATH: ICD-10-CM

## 2023-01-04 DIAGNOSIS — I50.23 ACUTE ON CHRONIC SYSTOLIC (CONGESTIVE) HEART FAILURE: ICD-10-CM

## 2023-01-04 DIAGNOSIS — N17.9 ACUTE KIDNEY FAILURE, UNSPECIFIED: ICD-10-CM

## 2023-01-04 DIAGNOSIS — Z87.438 PERSONAL HISTORY OF OTHER DISEASES OF MALE GENITAL ORGANS: ICD-10-CM

## 2023-01-04 LAB
ALBUMIN SERPL ELPH-MCNC: 3.5 G/DL — SIGNIFICANT CHANGE UP (ref 3.3–5)
ALP SERPL-CCNC: 147 U/L — HIGH (ref 40–120)
ALT FLD-CCNC: 36 U/L — SIGNIFICANT CHANGE UP (ref 10–45)
ANION GAP SERPL CALC-SCNC: 13 MMOL/L — SIGNIFICANT CHANGE UP (ref 5–17)
AST SERPL-CCNC: 45 U/L — HIGH (ref 10–40)
BILIRUB SERPL-MCNC: 1.1 MG/DL — SIGNIFICANT CHANGE UP (ref 0.2–1.2)
BUN SERPL-MCNC: 51 MG/DL — HIGH (ref 7–23)
CALCIUM SERPL-MCNC: 9 MG/DL — SIGNIFICANT CHANGE UP (ref 8.4–10.5)
CHLORIDE SERPL-SCNC: 105 MMOL/L — SIGNIFICANT CHANGE UP (ref 96–108)
CO2 SERPL-SCNC: 19 MMOL/L — LOW (ref 22–31)
CREAT ?TM UR-MCNC: 28 MG/DL — SIGNIFICANT CHANGE UP
CREAT SERPL-MCNC: 1.62 MG/DL — HIGH (ref 0.5–1.3)
CULTURE RESULTS: SIGNIFICANT CHANGE UP
EGFR: 43 ML/MIN/1.73M2 — LOW
GLUCOSE SERPL-MCNC: 90 MG/DL — SIGNIFICANT CHANGE UP (ref 70–99)
HCT VFR BLD CALC: 35.9 % — LOW (ref 39–50)
HGB BLD-MCNC: 11.3 G/DL — LOW (ref 13–17)
MCHC RBC-ENTMCNC: 29.4 PG — SIGNIFICANT CHANGE UP (ref 27–34)
MCHC RBC-ENTMCNC: 31.5 GM/DL — LOW (ref 32–36)
MCV RBC AUTO: 93.5 FL — SIGNIFICANT CHANGE UP (ref 80–100)
NRBC # BLD: 0 /100 WBCS — SIGNIFICANT CHANGE UP (ref 0–0)
PHOSPHATE SERPL-MCNC: 3.9 MG/DL — SIGNIFICANT CHANGE UP (ref 2.5–4.5)
PLATELET # BLD AUTO: 154 K/UL — SIGNIFICANT CHANGE UP (ref 150–400)
POTASSIUM SERPL-MCNC: 4 MMOL/L — SIGNIFICANT CHANGE UP (ref 3.5–5.3)
POTASSIUM SERPL-SCNC: 4 MMOL/L — SIGNIFICANT CHANGE UP (ref 3.5–5.3)
PROT SERPL-MCNC: 6.7 G/DL — SIGNIFICANT CHANGE UP (ref 6–8.3)
RBC # BLD: 3.84 M/UL — LOW (ref 4.2–5.8)
RBC # FLD: 17.6 % — HIGH (ref 10.3–14.5)
SODIUM SERPL-SCNC: 137 MMOL/L — SIGNIFICANT CHANGE UP (ref 135–145)
SODIUM UR-SCNC: 118 MMOL/L — SIGNIFICANT CHANGE UP
SPECIMEN SOURCE: SIGNIFICANT CHANGE UP
T4 AB SER-ACNC: 4.7 UG/DL — SIGNIFICANT CHANGE UP (ref 4.6–12)
TSH SERPL-MCNC: 4.75 UIU/ML — HIGH (ref 0.27–4.2)
UUN UR-MCNC: 350 MG/DL — SIGNIFICANT CHANGE UP
WBC # BLD: 5.8 K/UL — SIGNIFICANT CHANGE UP (ref 3.8–10.5)
WBC # FLD AUTO: 5.8 K/UL — SIGNIFICANT CHANGE UP (ref 3.8–10.5)

## 2023-01-04 PROCEDURE — 99223 1ST HOSP IP/OBS HIGH 75: CPT | Mod: GC

## 2023-01-04 PROCEDURE — 99223 1ST HOSP IP/OBS HIGH 75: CPT | Mod: FS

## 2023-01-04 PROCEDURE — 12345: CPT | Mod: NC,GC

## 2023-01-04 PROCEDURE — 93306 TTE W/DOPPLER COMPLETE: CPT | Mod: 26

## 2023-01-04 RX ORDER — TAMSULOSIN HYDROCHLORIDE 0.4 MG/1
0.4 CAPSULE ORAL AT BEDTIME
Refills: 0 | Status: DISCONTINUED | OUTPATIENT
Start: 2023-01-04 | End: 2023-01-10

## 2023-01-04 RX ORDER — MIRTAZAPINE 45 MG/1
30 TABLET, ORALLY DISINTEGRATING ORAL DAILY
Refills: 0 | Status: DISCONTINUED | OUTPATIENT
Start: 2023-01-04 | End: 2023-01-10

## 2023-01-04 RX ORDER — SACUBITRIL AND VALSARTAN 24; 26 MG/1; MG/1
1 TABLET, FILM COATED ORAL
Refills: 0 | Status: DISCONTINUED | OUTPATIENT
Start: 2023-01-05 | End: 2023-01-05

## 2023-01-04 RX ORDER — FUROSEMIDE 40 MG
40 TABLET ORAL ONCE
Refills: 0 | Status: COMPLETED | OUTPATIENT
Start: 2023-01-04 | End: 2023-01-04

## 2023-01-04 RX ORDER — SPIRONOLACTONE 25 MG/1
12.5 TABLET, FILM COATED ORAL DAILY
Refills: 0 | Status: DISCONTINUED | OUTPATIENT
Start: 2023-01-04 | End: 2023-01-05

## 2023-01-04 RX ORDER — CHOLECALCIFEROL (VITAMIN D3) 125 MCG
1 CAPSULE ORAL
Qty: 0 | Refills: 0 | DISCHARGE

## 2023-01-04 RX ORDER — CLOPIDOGREL BISULFATE 75 MG/1
75 TABLET, FILM COATED ORAL DAILY
Refills: 0 | Status: DISCONTINUED | OUTPATIENT
Start: 2023-01-04 | End: 2023-01-04

## 2023-01-04 RX ORDER — METOPROLOL TARTRATE 50 MG
1 TABLET ORAL
Qty: 0 | Refills: 0 | DISCHARGE

## 2023-01-04 RX ORDER — ASPIRIN/CALCIUM CARB/MAGNESIUM 324 MG
81 TABLET ORAL DAILY
Refills: 0 | Status: DISCONTINUED | OUTPATIENT
Start: 2023-01-04 | End: 2023-01-10

## 2023-01-04 RX ORDER — VENLAFAXINE HCL 75 MG
150 CAPSULE, EXT RELEASE 24 HR ORAL DAILY
Refills: 0 | Status: DISCONTINUED | OUTPATIENT
Start: 2023-01-04 | End: 2023-01-10

## 2023-01-04 RX ORDER — FUROSEMIDE 40 MG
40 TABLET ORAL
Refills: 0 | Status: DISCONTINUED | OUTPATIENT
Start: 2023-01-04 | End: 2023-01-05

## 2023-01-04 RX ORDER — LATANOPROST 0.05 MG/ML
1 SOLUTION/ DROPS OPHTHALMIC; TOPICAL AT BEDTIME
Refills: 0 | Status: DISCONTINUED | OUTPATIENT
Start: 2023-01-04 | End: 2023-01-10

## 2023-01-04 RX ORDER — METOPROLOL TARTRATE 50 MG
25 TABLET ORAL
Refills: 0 | Status: DISCONTINUED | OUTPATIENT
Start: 2023-01-04 | End: 2023-01-05

## 2023-01-04 RX ORDER — SPIRONOLACTONE 25 MG/1
12.5 TABLET, FILM COATED ORAL DAILY
Refills: 0 | Status: DISCONTINUED | OUTPATIENT
Start: 2023-01-04 | End: 2023-01-04

## 2023-01-04 RX ORDER — ACETAMINOPHEN 500 MG
2 TABLET ORAL
Qty: 0 | Refills: 0 | DISCHARGE

## 2023-01-04 RX ADMIN — LATANOPROST 1 DROP(S): 0.05 SOLUTION/ DROPS OPHTHALMIC; TOPICAL at 22:45

## 2023-01-04 RX ADMIN — SPIRONOLACTONE 12.5 MILLIGRAM(S): 25 TABLET, FILM COATED ORAL at 13:21

## 2023-01-04 RX ADMIN — Medication 150 MILLIGRAM(S): at 14:59

## 2023-01-04 RX ADMIN — Medication 40 MILLIGRAM(S): at 06:51

## 2023-01-04 RX ADMIN — MIRTAZAPINE 30 MILLIGRAM(S): 45 TABLET, ORALLY DISINTEGRATING ORAL at 22:45

## 2023-01-04 RX ADMIN — Medication 40 MILLIGRAM(S): at 15:07

## 2023-01-04 RX ADMIN — CLOPIDOGREL BISULFATE 75 MILLIGRAM(S): 75 TABLET, FILM COATED ORAL at 13:20

## 2023-01-04 RX ADMIN — Medication 25 MILLIGRAM(S): at 18:29

## 2023-01-04 RX ADMIN — TAMSULOSIN HYDROCHLORIDE 0.4 MILLIGRAM(S): 0.4 CAPSULE ORAL at 22:45

## 2023-01-04 RX ADMIN — Medication 81 MILLIGRAM(S): at 13:20

## 2023-01-04 NOTE — PROGRESS NOTE ADULT - PROBLEM SELECTOR PLAN 8
Diet: DASH/TLC  DVT PPx:   Dispo: pending clinical improvement  Code Status: full code - c/w home flomax 0.4mg qhs

## 2023-01-04 NOTE — H&P ADULT - NSHPREVIEWOFSYSTEMS_GEN_ALL_CORE
REVIEW OF SYSTEMS:    CONSTITUTIONAL: No weakness, fevers or chills  EYES/ENT: No visual changes;  No vertigo or throat pain +rhinorrhea   NECK: No pain or stiffness  RESPIRATORY: +cough, no wheezing, no hemoptysis; + shortness of breath  CARDIOVASCULAR: No chest pain or palpitations  GASTROINTESTINAL: No abdominal or epigastric pain. No nausea, vomiting, or hematemesis; No diarrhea or constipation. No melena or hematochezia.  GENITOURINARY: No dysuria, frequency or hematuria  NEUROLOGICAL: No numbness or weakness  All other review of systems is negative unless indicated above.

## 2023-01-04 NOTE — H&P ADULT - PROBLEM SELECTOR PLAN 5
- continue with home regimen of Effexor and Mirtazapine - TSH elevated to 7 on admission  - will repeat TSH and order a free T4

## 2023-01-04 NOTE — PROGRESS NOTE ADULT - PROBLEM SELECTOR PLAN 4
Hx of NSTEMI in 5/2022 s/p cardiac cath showing RCA  and significant LAD and diagonal lesions w/ LILLIANA 3 flow  - TTE (11/2022): EF 22% w/ severe LV systolic dysfunction, decreased RV systolic function, and severe pHTN  - f/u repeat TTE  - c/w ASA 81mg qd  - c/w rosuvastatin 20mg qhs (patient cannot tolerate atorvastatin) Hx of NSTEMI in 5/2022 s/p cardiac cath showing RCA  and significant LAD and diagonal lesions w/ LILLIANA 3 flow. EKG nonischemic and trops in ED unremarkable (52 --> 45)  - TTE (11/2022): EF 22% w/ severe LV systolic dysfunction, decreased RV systolic function, and severe pHTN  - TTE (1/4/2023): EF 27% w/ eccentric LVH, severe LVSD, akinesis of apical, septal, inferior walls and hypokinesis of remaining walls, severe LAE w/ dec RVSF  - c/w ASA 81mg qd  - c/w Plavix 75mg qd  - c/w rosuvastatin 20mg qhs (patient cannot tolerate atorvastatin) Hx of NSTEMI in 5/2022 s/p cardiac cath showing RCA  and significant LAD and diagonal lesions w/ LILLIANA 3 flow. EKG nonischemic and trops in ED unremarkable (52 --> 45)  - TTE (11/2022): EF 22% w/ severe LV systolic dysfunction, decreased RV systolic function, and severe pHTN  - TTE (1/4/2023): EF 27% w/ eccentric LVH, severe LVSD, akinesis of apical, septal, inferior walls and hypokinesis of remaining walls, severe LAE w/ dec RVSF  - c/w ASA 81mg qd  - hold Plavix 75mg qd x 5d for possible procedure  - c/w rosuvastatin 20mg qhs (patient cannot tolerate atorvastatin)

## 2023-01-04 NOTE — H&P ADULT - PROBLEM SELECTOR PLAN 4
- silent MI this past May  - continue with DAPT  - continue rosuvastatin 20mg (patient cannot tolerate atorvastatin)

## 2023-01-04 NOTE — PROGRESS NOTE ADULT - PROBLEM SELECTOR PLAN 1
Pt. p/w 2 weeks worsening dyspnea on exertion and orthopnea x 2 weeks. Pro-BNP 5600 on admission w/ CTA showing moderate loculated R pleural effusion and pt. w/ +entero/rhinovirus on RVP. Presentation most likely 2/2 acute on chronic CHF exacerbation i/s/o entero/rhinovirus vs. pleural effusion   - TTE (11/2022): EF 22% w/ severe LV systolic dysfunction, decreased RV systolic function, and severe pHTN  - HF consulted; f/u recs  - f/u repeat TTE  - s/p IV Lasix 40mg x1; c/w IV Lasix 40mg qd w/ net goal negative 500mg-1L  - c/w home Aldactone 12.5mg mg  - holding home metoprolol succinate 50mg qd i/s/o bradycardia (HR 37) in ED  - holding Entresto i/s/o BRODY   - strict I/Os, daily standing weights  - monitor electrolytes and replete for K>4 and Mg>2 Pt. p/w 2 weeks worsening dyspnea on exertion and orthopnea x 2 weeks. Pro-BNP 5600 on admission w/ CTA showing moderate loculated R pleural effusion and pt. w/ +entero/rhinovirus on RVP. Presentation most likely 2/2 acute on chronic CHF exacerbation i/s/o entero/rhinovirus vs. pleural effusion   - TTE (11/2022): EF 22% w/ severe LV systolic dysfunction, decreased RV systolic function, and severe pHTN  - TTE (1/4/2023): EF 27% w/ eccentric LVH, severe LVSD, akinesis of apical, septal, inferior walls and hypokinesis of remaining walls, severe LAE w/ dec RVSF  - HF consulted; appreciate recs  - s/p IV Lasix 40mg x1 in ED; c/w IV Lasix 40mg BID for continued diuresis  - c/w Aldactone 12.5mg qd  - c/w metoprolol succinate 50mg qd  - holding Entresto and Farxiga i/s/o BRODY   - strict I/Os, daily standing weights  - monitor electrolytes and replete for K>4 and Mg>2 Pt. p/w 2 weeks worsening dyspnea on exertion and orthopnea x 2 weeks. Pro-BNP 5600 on admission w/ CTA showing moderate loculated R pleural effusion and pt. w/ +entero/rhinovirus on RVP. Presentation most likely 2/2 acute on chronic CHF exacerbation i/s/o entero/rhinovirus vs. pleural effusion   - TTE (11/2022): EF 22% w/ severe LV systolic dysfunction, decreased RV systolic function, and severe pHTN  - TTE (1/4/2023): EF 27% w/ eccentric LVH, severe LVSD, akinesis of apical, septal, inferior walls and hypokinesis of remaining walls, severe LAE w/ dec RVSF  - HF consulted; appreciate recs  - s/p IV Lasix 40mg x1 in ED; c/w IV Lasix 40mg BID for continued diuresis, will reassess volume status and switch to daily dosing 1/5 AM  - c/w Aldactone 12.5mg qd  - c/w metoprolol succinate 50mg qd  - restart Entresto 24-26mg 1/5 AM  - holding Farxiga for now  - strict I/Os, daily standing weights  - monitor electrolytes and replete for K>4 and Mg>2

## 2023-01-04 NOTE — PROGRESS NOTE ADULT - PROBLEM SELECTOR PLAN 3
SCr on admission 1.77, baseline ~0.8; likely pre-renal i/s/o cardiorenal syndrome  - f/u urine studies   - avoid nephrotoxic meds and renally dose medications   - monitor creatinine daily SCr on admission 1.77, baseline ~0.8; likely pre-renal i/s/o cardiorenal syndrome  - f/u urine studies   - c/w IV Lasix 40mg BID for diuresis  - avoid nephrotoxic meds and renally dose medications   - monitor creatinine daily SCr on admission 1.77, baseline ~0.8; likely pre-renal i/s/o cardiorenal syndrome  - f/u urine studies   - s/p IV Lasix 40mg x1 in ED; c/w IV Lasix 40mg BID for continued diuresis, will reassess volume status and switch to daily dosing 1/5 AM  - avoid nephrotoxic meds and renally dose medications   - monitor creatinine daily

## 2023-01-04 NOTE — CONSULT NOTE ADULT - ATTENDING COMMENTS
Mr. Becerra is a 77 year old male with PMH significant for CAD with a silent MI this past May, CHF, lung nodule s/p partial right lobectomy c/b cardiogenic shock, and depression who presented due to worsening shortness of breath on exertion.  He is noted to have a right pleural effusion and a 1.7cm HARRY nodule.  Also positive for enterorhinovirus.  I personally reviewed imaging.  In 7/2022 prior to lung resection there was a 4mm lung nodule noted in the HARRY.  CXRs post lobectomy show small right pleural effusion in October.  Differential for lung nodule includes metastatic lesion, infectious focus, new primary lung cancer- growth however seems very rapid over 6 months.  Pleural effusion likely related to CHF however can also be malignant.  Would continue to diurese, hold plavix in anticipation of thoracentesis.  Will discuss bronchoscopy with biopsy with interventional pulmonology, as radial ebus will most likely be required.  Please consult cardiology regarding safety of holding Plavix.  Agree with plan as outlined above.

## 2023-01-04 NOTE — H&P ADULT - PROBLEM SELECTOR PLAN 2
- - CTA showed moderate loculated right pleural effusion  - not likely infectious given the fact that patient is afebrile without a WBC count, and non-toxic appearing   - would hold off on abx for now   - patient may benefit from a therapeutic thoracentesis if diuresis does not improve his shortness of breath

## 2023-01-04 NOTE — CONSULT NOTE ADULT - ASSESSMENT
77 year old male with PMH significant for CAD with a silent MI this past May,  ICM (HFrEF 20-25), lung nodule s/p partial right lobectomy presented to [facility] with shortness of breath and volume overloaded. He underwent CT Chest which showed new left upper lobe nodule and right pleural effusion. On exam he is noted to have elevated JVP and BRODY likely related to congestion. He is currently being diuresis with IV diuretics and will optimize his GDMT as his pressure allows. He will need further workup regarding this new lung nodule. Will continue to follow.  77 year old male with PMH significant for CAD with a silent MI this past May,  ICM (HFrEF 20-25), lung nodule s/p partial right lobectomy presented to [facility] with shortness of breath and volume overloaded. He underwent CT Chest which showed new left upper lobe nodule and right pleural effusion. On exam he is noted to have elevated JVP and BRODY likely related to congestion. He is currently being diuresis with IV diuretics and will optimize his GDMT as his pressure allows. He will need further workup regarding this new lung nodule. Will continue to follow.     Cardiac Testing:  Echo 1/4/2023: LVEF 27%, LVEDD 6.5cm, LVEDS 5.1cm, decreased RV function, severe MR (VC 0.5cm, MR vol 36mL. EROA 0.24cm), severe bi-AE  RHC 5/2022: RA 15, PA 56/134 (41), PCWP 32 with V waves to 40s, PA sat 59%, CO 3, CI 1.7

## 2023-01-04 NOTE — CONSULT NOTE ADULT - PROBLEM SELECTOR RECOMMENDATION 5
- noted to have tested positive for rhinovirus on 1/3 - hx of lung nodule s/p partial right lobectomy   - CT Angio on 1/3 revealed mildly increased moderate loculated right pleural effusion since 12/27/2022. Indeterminate 1.7 cm apicoposterior left upper lobe nodule, enlarged since July 2022 (4 mm). Consider follow-up chest CT in 3 months, PET/CT and/or tissue sampling for further evaluation.  - consult Pulm for further recommendations

## 2023-01-04 NOTE — H&P ADULT - ASSESSMENT
77 year old male with PMH significant for CAD with a silent MI this past May, CHF, lung nodule s/p partial right lobectomy c/b cardiogenic shock, and depression who presented due to worsening shortness of breath with exertion. Given symptoms, lab findings, and imaging, the most likely etiology is an acute on chronic exacerbation of CHF.      77 year old male with PMH significant for CAD with a silent MI this past May, CHF, lung nodule s/p partial right lobectomy c/b cardiogenic shock, and depression who presented due to worsening shortness of breath with exertion most likely related to acute decompensated HF 2/2 to entero/rhinovirus

## 2023-01-04 NOTE — CONSULT NOTE ADULT - ASSESSMENT
77 year old male with PMH significant for CAD with a silent MI this past May, CHF, lung nodule s/p partial right lobectomy c/b cardiogenic shock, and depression who presented due to worsening shortness of breath with exertion most likely related to acute decompensated HF 2/2 to entero/rhinovirus. Pulmonology consulted for right-sided pleural effusion and enlarging HARRY lung nodule.     #HARRY lung nodule  #Right pleural effusion  #Entero/rhinovirus    Recommendations:  - suspect dyspnea on exertion is secondary to CHF exacerbation   - heart failure following, agree with aggressive diuresis given volume overload on exam  - bedside ultrasound with simple right-sided pleural effusion, no left-sided effusion, will continue to monitor size of effusion with diuresis  - rapid growth of HARRY lung nodule is concerning for malignancy vs less likely infectious   - will speak with interventional pulm to discuss biopsy of HARRY lung nodule  - please hold plavix for 5 days starting now in preparation for possible intervention  - supportive care for viral illness    Bharath Healy MD  Internal Medicine, PGY-2    *Note not finalized until attending signature*

## 2023-01-04 NOTE — CONSULT NOTE ADULT - NS ATTEND AMEND GEN_ALL_CORE FT
Patient was seen and examined at bedside with the advanced care provider.    Briefly, Mr. Becerra is a 78 yo M with pmhx of ICM with recent STEMI in 5/2022 c/b cardiogenic shock requiring IABP but was not revascularized due to non viable myocardium, residual HFrEF, LV thrombus, and prior lung adenocarcinoma with RUL VATS in 11/2022.  He presents today with 7-10days of OGLESBY and orthopnea.  Of note, this coincided with the time he switched from Entresto to losartan.  He was also found to be + for Rhinovirus.  On presention BNP was elevated to 5606 with troponin 52 - 45.  Cr was elevated to 1.77 which improved to 1.62 with IV lasix.  He reports that his breathing is much improved now.  Work up in the ED unfortunately did show an enlarging left upper lobe nodule on CTA.  Please consult pulmonolgy/oncology team.  On exam, JVP remains elevated to mandible with no lower extremity edema.  He is warm on exam. Patient and his wife would like to change back to Entresto.  Start on Entresto 24-26mg BID tomorrow AM.  Stop losartan.  Previously they had financial concerns with the Entresto but are ok with paying for it now.  Continue with lasix 40mg IV daily.  Continue beta blocker and MRA at home doses.  I spoke with his outpatient HF cardiologist.  There were no plans for repeat viability testing and no PCI was going to be offered based on his visit with Dr. Helms.  His coronary disease appears stable with no active chest pain and stable troponins.  His current CHF exacerbation is likely in the setting of med change off Entresto + URI.  Echo today show LVEF 27% with dilated LV of 6.5cm.  He has severe MR which will need re-evaluation once more euvolemic to understand if this is 2/2 ischemia or functional.

## 2023-01-04 NOTE — H&P ADULT - ATTENDING COMMENTS
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 His evaluation reveal multifactorial etiology for his symptoms, including acute heart failure decompensation versus progression of severe heart failure and rhino enterovirus. He has evidence of hypothyroidism, perhaps associated with fatigue contributing to his symptoms. He has a 1.7 cm HARRY lung nodule, increased in size from July when it was just 4 mm. Associated with all this is moderate loculated pleural effusion which is mildly increased in size from prior studies.      Of note, he has a prior history of RUL lobectomy. Pathology report is positive for adenocarcinoma with clean margins. He was seen by outpatient oncology with plan for expectant surveillance.     #decompensated heart failure versus progression of chronic combined systolic and diastolic heart failure   #rapidly enlarging lung nodule   #BRODY—cardiorenal disease   #pleural effusion     Agree with starting the patient on lasix 40mg IV daily. Strict I/O. Monitor respiratory status and would consider thoracentesis if there is no improvement with lasix. Please obtain heart failure consult this morning. Obtain TSH with T4 levels this morning. Rest of plan as per resident note.

## 2023-01-04 NOTE — CHART NOTE - NSCHARTNOTEFT_GEN_A_CORE
Discussed with primary team possibility of holding plavix for 5 days prior to thoracentesis. Patient with 100% RCA , 70% D1, 70% LAD lesion noted on Cath from May 2022 when he presented for a STEMI which was managed medically after viability testing indicated mostly infarcted tissue.  -Can hold plavix for 5 days prior to thoracentesis/procedure  -Resume plavix after hemostasis achieved  -continue MICHAEL Steve MD  Cardiology fellow

## 2023-01-04 NOTE — CONSULT NOTE ADULT - SUBJECTIVE AND OBJECTIVE BOX
ADVANCED HEART FAILURE & TRANSPLANT- CONSULT NOTE  *To reach the NS1 Team from 8am to 5pm, please call [phone].  ___________________________________________________________________________    HPI:  77 year old male with PMH significant for CAD with a silent MI this past May, CHF, lung nodule s/p partial right lobectomy c/b cardiogenic shock, and depression who presented due to worsening shortness of breath with exertion. Patient states that over the last 2 weeks he was slightly short o breath when walk or going up a flight of stairs; however, it became significantly worse in the last 3-4 days. Patient states that in the last 1-2 weeks he has had to prop himself up more when sleeping and would wake up 7-8 times in the middle of the night to walk around because he could not get comfortable in bed. Patient states that during this time he had some rhinorrhea, but not cough, nasal congestion, or sore throat. Patient denies any fever, chills, weakness, chest pain, palpitations, lightheadedness, cough, abdominal pain, nausea, vomiting, urinary frequency or pain, constipation, diarrhea, or worsening lower extremity edema.    In the ED, patient's temp was 97.6, HR was 71, BP 90/75, RR of 20, saturating 99% on room air. Labs were notable for an elevated pro- BNP of 5605, Troponin was slightly elevated to 52, but downtrended to 45, creatinine of 1.77, and a lactate of 2.5 off the VBG. Additionally an RVP was positive for entero/rhinovirus. A CTA of the chest showed a mildly loculated right pleural effusion and an indeterminate 1.7cm apicoposterior left upper lobe nodule.    PAST MEDICAL & SURGICAL HISTORY:  BPH (benign prostatic hyperplasia)      Bipolar disorder      Depression      Anxiety      Umbilical hernia, incarcerated      Inguinal hernia of right side without obstruction or gangrene      Depression      Constipation      Urinary retention      CAD (coronary artery disease)      SCC (squamous cell carcinoma)      Lung mass      Other nonspecific abnormal finding of lung field      LV (left ventricular) mural thrombus      History of inguinal hernia      Severe left ventricular systolic dysfunction      History of CHF (congestive heart failure)      History of arthroscopy of knee  Right, 1987      History of tonsillectomy  1952      History of hernia repair      H/O coronary angiogram          FAMILY HISTORY:  Family history of depression (Mother)    FH: CAD (coronary artery disease) (Sibling, Sibling)    Family history of diabetes mellitus (DM) (Sibling)        Home Medications:  Aldactone: 12.5 milligram(s) orally once a day (04 Jan 2023 04:49)  Farxiga 5 mg oral tablet: 1 tab(s) orally once a day (04 Jan 2023 04:48)  Flomax 0.4 mg oral capsule: 1 cap(s) orally once a day (23 Sep 2022 06:35)  latanoprost 0.005% ophthalmic solution: 1 drop(s) to each affected eye once a day (in the evening) (04 Jan 2023 04:51)  losartan 50 mg oral tablet: 1 tab(s) orally once a day (04 Jan 2023 04:49)  Metoprolol Succinate ER 25 mg oral tablet, extended release: 1 tab(s) orally every 12 hours (04 Jan 2023 04:47)  mirtazapine 15 mg oral tablet: 2 tab(s) orally (04 Jan 2023 04:44)      Medications:  aspirin  chewable 81 milliGRAM(s) Oral daily  clopidogrel Tablet 75 milliGRAM(s) Oral daily  furosemide   Injectable 40 milliGRAM(s) IV Push <User Schedule>  latanoprost 0.005% Ophthalmic Solution 1 Drop(s) Both EYES at bedtime  mirtazapine 30 milliGRAM(s) Oral daily  spironolactone 12.5 milliGRAM(s) Oral daily  tamsulosin 0.4 milliGRAM(s) Oral at bedtime  venlafaxine XR. 150 milliGRAM(s) Oral daily    Vitals:  Vital Signs Last 24 Hours  T(C): 36.3 (01-04-23 @ 12:34), Max: 36.6 (01-04-23 @ 01:00)  HR: 82 (01-04-23 @ 12:34) (37 - 82)  BP: 109/73 (01-04-23 @ 12:34) (86/60 - 112/83)  RR: 17 (01-04-23 @ 12:34) (13 - 20)  SpO2: 97% (01-04-23 @ 12:34) (96% - 100%)        I&O's Summary      Physical Exam  General: No distress. Comfortable.  Neck: Neck supple. JVP not elevated. No masses  Chest: Clear to auscultation bilaterally  CV: Normal S1 and S2. No murmurs, rub, or gallops. Radial pulses normal.  Abdomen: Soft, non-distended, non-tender  Extremities: warm and well perfused  Neurology: Alert and oriented times three.     Labs:                        11.3   5.80  )-----------( 154      ( 04 Jan 2023 07:47 )             35.9     01-04    137  |  105  |  51<H>  ----------------------------<  90  4.0   |  19<L>  |  1.62<H>    Ca    9.0      04 Jan 2023 07:47  Phos  3.9     01-04  Mg     2.4     01-03    TPro  6.7  /  Alb  3.5  /  TBili  1.1  /  DBili  x   /  AST  45<H>  /  ALT  36  /  AlkPhos  147<H>  01-04    PT/INR - ( 03 Jan 2023 17:32 )   PT: 15.6 sec;   INR: 1.34 ratio         PTT - ( 03 Jan 2023 17:32 )  PTT:29.5 sec      Serum Pro-Brain Natriuretic Peptide: 5606 pg/mL (01-03 @ 17:32)          Imaging Studies     < from: Transthoracic Echocardiogram (01.04.23 @ 07:34) >  Dimensions:    Normal Values:  LA:     5.0    2.0 - 4.0 cm  Ao:     3.8    2.0 - 3.8 cm  SEPTUM: 0.8    0.6 - 1.2 cm  PWT:  1.2    0.6 - 1.1 cm  LVIDd:  6.5    3.0 - 5.6 cm  LVIDs:  5.1    1.8 - 4.0 cm  Derived variables:  LVMI: 144 g/m2  RWT: 0.36  Fractional short: 22 %  EF (Pizarro Rule): 27 %Doppler Peak Velocity (m/sec):  MV=0.9 AoV=0.8  ------------------------------------------------------------------------  Observations:  Mitral Valve: Normal mitral valve. Severe mitral  regurgitation. The EROA using flow convergence method is  0.24 cm2,  MR regurgitant volume is 36 mL/beat. MR vena  contracta is 0.51 cm. Peak mitral valve gradient equals 3  mm Hg, mean transmitral valve gradient equals 1 mm Hg.  Aortic Valve/Aorta: Normal trileaflet aortic valve. Peak  transaortic valve gradient equals 3 mm Hg. Mild-moderate  aortic regurgitation.  Aortic Root: 3.8 cm.  Ascending Aorta: 3.3 cm.  LVOT diameter: 2.5 cm.  Left Atrium: Severely dilated left atrium.  LA volume index  = 60 cc/m2.  Left Ventricle: Severe segmental left ventricular systolic  dysfunction. LVEF calculated using biplane Pizarro's method  is 27%. Akinesis of the apical, septal, inferior walls with  remaining walls hypokinetic.  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  Eccentric left ventricular  hypertrophy (dilated left ventricle with normal relative  wall thickness). Unableto determine diastolic function due  to mrerged E and A wave.  Right Heart: Severe right atrial enlargement. Right  ventricular enlargement with decreased right ventricular  systolic function. Normal tricuspid valve. Mild tricuspid  regurgitation. Normal pulmonic valve. Mild pulmonic  regurgitation.  Pericardium/Pleura: No pericardial effusion seen.  Hemodynamic: Estimated right atrial pressure is 8 mm Hg.  Estimated right ventricular systolic pressure equals 57 mm  Hg, assuming right atrial pressure equals 8 mm Hg,  consistent with moderate pulmonary hypertension. Color  Doppler demonstrates no evidence of a patent foramen ovale.  ------------------------------------------------------------------------  Conclusions:  1. Normal mitral valve. Severe mitral regurgitation. The  EROA using flow convergence method is 0.24 cm2,  MR  regurgitant volume is 36 mL/beat. MR vena contracta is 0.51  cm.  2. Normal trileaflet aortic valve. Mild-moderate aortic  regurgitation.  3. Eccentric left ventricular hypertrophy (dilated left  ventricle with normal relative wall thickness).Severe  segmental left ventricular systolic dysfunction. LVEF  calculated using biplane Pizarro's method is 27%. Akinesis  of the apical, septal, inferior walls with remaining walls  hypokinetic.  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 Unable to determine diastolic function due to  mrerged E and A wave.  4. Severe right atrial enlargement.Right ventricular  enlargement with decreased right ventricular systolic  function.  5. Normal tricuspid valve. Mild tricuspid regurgitation.  *** Compared with echocardiogram of 5/20/2022, there is no  longer an LV thrombus and no pericardial effusion.  ------------------------------------------------------------------    < end of copied text >

## 2023-01-04 NOTE — PROGRESS NOTE ADULT - PROBLEM SELECTOR PLAN 6
- c/w home Effexor 150mg qd  - c/w Mirtazapine 30mg qd TSH elevated to 7 on admission  - f/u repeat TSH and free T4

## 2023-01-04 NOTE — CONSULT NOTE ADULT - PROBLEM SELECTOR RECOMMENDATION 4
- hx of lung nodule s/p partial right lobectomy   - CT Angio on 1/3 revealed mildly increased moderate loculated right pleural effusion since 12/27/2022. Indeterminate 1.7 cm apicoposterior left upper lobe nodule, enlarged since July 2022 (4 mm). Consider follow-up chest CT in 3 months, PET/CT and/or tissue sampling for further evaluation.  - please consult Pulm for further recommendaitons - hx of lung nodule s/p partial right lobectomy   - CT Angio on 1/3 revealed mildly increased moderate loculated right pleural effusion since 12/27/2022. Indeterminate 1.7 cm apicoposterior left upper lobe nodule, enlarged since July 2022 (4 mm). Consider follow-up chest CT in 3 months, PET/CT and/or tissue sampling for further evaluation.  - consult Pulm for further recommendations - upon admission noted to have BRODY with creatinine 1.77, likely related to congestion  - continue with diuretics as stated above  - trend daily

## 2023-01-04 NOTE — PROGRESS NOTE ADULT - PROBLEM SELECTOR PLAN 2
CTA (1/3) showed moderate loculated R pleural effusion  - likely not infectious given patient is afebrile without a WBC count, and non-toxic appearing   - c/w IV Lasix 40mg qd   - monitor off on abx for now   - patient may benefit from a therapeutic thoracentesis if diuresis does not improve his shortness of breath CTA (1/3) showed moderate loculated R pleural effusion, slightly increased from prior. Given hx of lung adenocarcinoma and larger HARRY pulmonary nodule, suspect malignant effusion vs. less likely infectious given patient is afebrile without a WBC count, and non-toxic appearing   - pulm consulted for diagnostic +/- therapeutic thoracentesis and biopsy of pulmonary nodule; f/u recs  - c/w IV Lasix 40mg BID for diuresis  - monitor off on abx for now CTA (1/3) showed moderate loculated R pleural effusion, slightly increased from prior. Given hx of lung adenocarcinoma and larger HARRY pulmonary nodule, suspect malignant effusion vs. less likely infectious given patient is afebrile without a WBC count, and non-toxic appearing   - pulm consulted for diagnostic +/- therapeutic thoracentesis and biopsy of pulmonary nodule; appreciate recs  - s/p IV Lasix 40mg x1 in ED; c/w IV Lasix 40mg BID for continued diuresis, will reassess volume status and switch to daily dosing 1/5 AM  - hold Plavix 75mg qd x 5d for possible procedure  - monitor off on abx for now

## 2023-01-04 NOTE — CONSULT NOTE ADULT - TIME BILLING
Personal review of data, imaging and discussion with medical team.
Time spent at bedside interviewing and examining the patient, reviewing the chart and telemetry, and coordinating care with the primary team.  I counselled patient on the heart failure disease process and the ongoing medical therapy.

## 2023-01-04 NOTE — PROGRESS NOTE ADULT - ASSESSMENT
77M with CAD s/p silent MI in May 2022, HFrEF (EF 22% in 11/2022), lung nodule s/p partial right lobectomy c/b cardiogenic shock, and depression who presented due to worsening shortness of breath with exertion x 2 weeks, orthopnea, found to have elevated pro-BNP, +entero/rhinovirus w/ imaging showing R pleural effusion. Presentation likely 2/2 acute on chronic CHF exacerbation i/s/o entero/rhinovirus vs. pleural effusion.    77M with CAD s/p silent MI in May 2022, HFrEF (EF 22% in 11/2022), lung nodule s/p partial right lobectomy c/b cardiogenic shock, and depression who presented due to worsening shortness of breath with exertion x 2 weeks, orthopnea, found to have elevated pro-BNP, +entero/rhinovirus w/ imaging showing R pleural effusion. Presentation likely 2/2 acute on chronic CHF exacerbation i/s/o entero/rhinovirus.

## 2023-01-04 NOTE — PROGRESS NOTE ADULT - PROBLEM SELECTOR PLAN 5
TSH elevated to 7 on admission  - f/u repeat TSH and free T4 CTA (1/3) showing 1.7cm solid nodule in apicoposterior left upper lobe, enlarged since July 2022 (4mm). Pt w/ hx of lung nodule s/p partial right lobectomy w/ path showing adenocarcinoma  - pulm consulted for possible biopsy; f/u recs  - if no inpatient workup, then consider follow-up chest CT in 3 months, PET/CT outpatient CTA (1/3) showing 1.7cm solid nodule in apicoposterior left upper lobe, enlarged since July 2022 (4mm). Pt w/ hx of lung nodule s/p partial right lobectomy w/ path showing adenocarcinoma  - pulm consulted for possible biopsy; appreciate recs  - pulm to discuss w/ interventional pulm regarding biopsy  - if no inpatient workup, then consider follow-up chest CT in 3 months, PET/CT outpatient

## 2023-01-04 NOTE — H&P ADULT - NSHPPHYSICALEXAM_GEN_ALL_CORE
General: NAD, well groomed, well developed   Head: atraumatic, normocephalic   Eyes: Pupils equal, round, reactive to light, EOMI, anicteric sclera  ENT: slightly dry mucous membranes  Neck: supple, +JVD, no tender lymphadenopathy  Lungs: decreased breath sounds at base of right lung, no wheezes, rales, or rhonchi, no accessory muscle use for respirations   Heart: regular rate and rhythm, normal s1,s2, no murmurs, rubs, or gallops   Abdomen: soft, non-distended, non-tender, normoactive bowel sounds   Extremities: warm, well perfused, 1+ lower extremity edema   Neuro: AOx3, no focal neurological deficits

## 2023-01-04 NOTE — H&P ADULT - PROBLEM SELECTOR PLAN 3
- creatinine on admission was 1.77, unknown baseline   - would renally dose medications   - avoid nephrotoxic meds  - continue to monitor - creatinine on admission was 1.77, baseline ~0.8  - most likely cardiorenal  - would renally dose medications   - avoid nephrotoxic meds  - continue to monitor

## 2023-01-04 NOTE — CONSULT NOTE ADULT - PROBLEM SELECTOR RECOMMENDATION 2
- hx of CAD/STEMI in May 2022  - continue ASA/statin  - of note as an outpatient was discussing undergoing cardiac MRI to determine if patient would benefit from PCI - hx of CAD/STEMI in May 2022- was being medically treated   - continue ASA/statin  - of note as an outpatient schedule undergoing cardiac MRI however was cancelled as outpatient. Will be discussing with the interventionist here if patient would be a candidate for PCI

## 2023-01-04 NOTE — H&P ADULT - NSHPSOCIALHISTORY_GEN_ALL_CORE
Patient denies any tobacco use or illicit substance use. Patient occasionally drinks alcohol. Patient lives at home with his wife and performs all of his ADLs independently

## 2023-01-04 NOTE — PROGRESS NOTE ADULT - PROBLEM SELECTOR PLAN 9
Diet: DASH/TLC  DVT PPx: holding chemical VTE ppx in case pt. receive thoracentesis   Dispo: pending clinical improvement  Code Status: full code

## 2023-01-04 NOTE — PROGRESS NOTE ADULT - SUBJECTIVE AND OBJECTIVE BOX
*******************************  Pamella Brambila MD (PGY-1)  Internal Medicine  Contact via [employer] TEAMS  [facility] Pager: [phone]  [facility] Pager: [phone]  *******************************    PROGRESS NOTE:     Patient is a 77y old  Male who presents with a chief complaint of shortness of breath (2023 01:12)    INTERVAL EVENTS: No acute overnight events.     SUBJECTIVE: Patient seen and examined at bedside. This morning, the patient is comfortable and doing well. No acute complaints. Denies fevers, chills, N/V/D, chest pain, SOB, abdominal pain.    MEDICATIONS  (STANDING):  aspirin  chewable 81 milliGRAM(s) Oral daily  latanoprost 0.005% Ophthalmic Solution 1 Drop(s) Both EYES at bedtime  mirtazapine 30 milliGRAM(s) Oral daily  spironolactone Suspension 12.5 milliGRAM(s) Oral daily  tamsulosin 0.4 milliGRAM(s) Oral at bedtime  venlafaxine XR. 150 milliGRAM(s) Oral daily    MEDICATIONS  (PRN):    CAPILLARY BLOOD GLUCOSE    I&O's Summary    PHYSICAL EXAM:  Vital Signs Last 24 Hrs  T(C): 36.3 (2023 05:24), Max: 36.6 (2023 01:00)  T(F): 97.3 (2023 05:24), Max: 97.9 (2023 01:00)  HR: 67 (2023 05:24) (37 - 75)  BP: 103/67 (2023 05:24) (86/60 - 112/83)  BP(mean): 93 (2023 21:00) (68 - 93)  RR: 18 (2023 05:24) (13 - 20)  SpO2: 98% (2023 05:24) (96% - 100%)    Parameters below as of 2023 05:24  Patient On (Oxygen Delivery Method): room air    GENERAL: NAD, lying in bed comfortably  NECK: +JVD  HEART: S1, S2, Regular rate and rhythm, no murmurs, rubs, or gallops  LUNGS: decreased breath sounds at base of right lung. Unlabored respirations, no crackles, wheezing, or rhonchi  ABDOMEN: Soft, nontender, nondistended, +BS  EXTREMITIES: 2+ peripheral pulses bilaterally. 1+ LE edema  NERVOUS SYSTEM:  A&Ox3, no focal deficits   SKIN: No rashes or lesions    LABS:                        12.8   6.37  )-----------( 191      ( 2023 17:32 )             41.2         139  |  105  |  52<H>  ----------------------------<  109<H>  4.7   |  18<L>  |  1.77<H>    Ca    9.7      2023 17:32  Mg     2.4         TPro  7.4  /  Alb  4.0  /  TBili  1.3<H>  /  DBili  x   /  AST  51<H>  /  ALT  38  /  AlkPhos  163<H>  03    PT/INR - ( 2023 17:32 )   PT: 15.6 sec;   INR: 1.34 ratio    PTT - ( 2023 17:32 )  PTT:29.5 sec    Urinalysis Basic - ( 2023 21:11 )    Color: Yellow / Appearance: Clear / S.021 / pH: x  Gluc: x / Ketone: Negative  / Bili: Negative / Urobili: Negative   Blood: x / Protein: 30 mg/dL / Nitrite: Negative   Leuk Esterase: Negative / RBC: 1 /hpf / WBC 3 /HPF   Sq Epi: x / Non Sq Epi: 3 /hpf / Bacteria: Negative    RADIOLOGY & ADDITIONAL TESTS:  Results Reviewed:   Imaging Personally Reviewed:  Electrocardiogram Personally Reviewed:  Tele:    COORDINATION OF CARE:  Care Discussed with Consultants/Other Providers [Y/N]:  Prior or Outpatient Records Reviewed [Y/N]: *******************************  Pamella Brambila MD (PGY-1)  Internal Medicine  Contact via [employer] TEAMS  [facility] Pager: [phone]  [facility] Pager: [phone]  *******************************    PROGRESS NOTE:     Patient is a 77y old  Male who presents with a chief complaint of shortness of breath (2023 01:12)    INTERVAL EVENTS: Admitted overnight for SOB and acute heart failure exacerbation.    SUBJECTIVE: Patient seen and examined at bedside. This morning, the patient is comfortable and doing well. No acute complaints. Denies fevers, chills, N/V/D, chest pain, SOB, abdominal pain.    MEDICATIONS  (STANDING):  aspirin  chewable 81 milliGRAM(s) Oral daily  clopidogrel Tablet 75 milliGRAM(s) Oral daily  furosemide   Injectable 40 milliGRAM(s) IV Push <User Schedule>  latanoprost 0.005% Ophthalmic Solution 1 Drop(s) Both EYES at bedtime  metoprolol succinate ER 25 milliGRAM(s) Oral two times a day  mirtazapine 30 milliGRAM(s) Oral daily  spironolactone 12.5 milliGRAM(s) Oral daily  tamsulosin 0.4 milliGRAM(s) Oral at bedtime  venlafaxine XR. 150 milliGRAM(s) Oral daily    MEDICATIONS  (PRN):    CAPILLARY BLOOD GLUCOSE    I&O's Summary    PHYSICAL EXAM:  Vital Signs Last 24 Hrs  T(C): 36.3 (2023 05:24), Max: 36.6 (2023 01:00)  T(F): 97.3 (2023 05:24), Max: 97.9 (2023 01:00)  HR: 67 (2023 05:24) (37 - 75)  BP: 103/67 (2023 05:24) (86/60 - 112/83)  BP(mean): 93 (2023 21:00) (68 - 93)  RR: 18 (2023 05:24) (13 - 20)  SpO2: 98% (2023 05:24) (96% - 100%)    Parameters below as of 2023 05:24  Patient On (Oxygen Delivery Method): room air    GENERAL: NAD, lying in bed comfortably  NECK: +JVP up to mid-ear   HEART: S1, S2, Regular rate and rhythm, no murmurs, rubs, or gallops  LUNGS: decreased breath sounds at base of right lung. Unlabored respirations, no crackles, wheezing, or rhonchi  ABDOMEN: Soft, nontender, nondistended, +BS  EXTREMITIES: 2+ peripheral pulses bilaterally. 1-2+ LE edema  NERVOUS SYSTEM:  A&Ox3, no focal deficits   SKIN: No rashes or lesions    LABS:                        11.3   5.80  )-----------( 154      ( 2023 07:47 )             35.9     01-04    137  |  105  |  51<H>  ----------------------------<  90  4.0   |  19<L>  |  1.62<H>    Ca    9.0      2023 07:47  Phos  3.9     -04  Mg     2.4     -03    TPro  6.7  /  Alb  3.5  /  TBili  1.1  /  DBili  x   /  AST  45<H>  /  ALT  36  /  AlkPhos  147<H>  01-04    PT/INR - ( 2023 17:32 )   PT: 15.6 sec;   INR: 1.34 ratio    PTT - ( 2023 17:32 )  PTT:29.5 sec    Urinalysis Basic - ( 2023 21:11 )    Color: Yellow / Appearance: Clear / S.021 / pH: x  Gluc: x / Ketone: Negative  / Bili: Negative / Urobili: Negative   Blood: x / Protein: 30 mg/dL / Nitrite: Negative   Leuk Esterase: Negative / RBC: 1 /hpf / WBC 3 /HPF   Sq Epi: x / Non Sq Epi: 3 /hpf / Bacteria: Negative    RADIOLOGY & ADDITIONAL TESTS:  Results Reviewed:   < from: Transthoracic Echocardiogram (23 @ 07:34) >  ------------------------------------------------------------------------  Conclusions:  1. Normal mitral valve. Severe mitral regurgitation. The  EROA using flow convergence method is 0.24 cm2,  MR  regurgitant volume is 36 mL/beat. MR vena contracta is 0.51  cm.  2. Normal trileaflet aortic valve. Mild-moderate aortic  regurgitation.  3. Eccentric left ventricular hypertrophy (dilated left  ventricle with normal relative wall thickness).Severe  segmental left ventricular systolic dysfunction. LVEF  calculated using biplane Pizarro's method is 27%. Akinesis  of the apical, septal, inferior walls with remaining walls  hypokinetic.  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 Unable to determine diastolic function due to  mrerged E and A wave.  4. Severe right atrial enlargement.Right ventricular  enlargement with decreased right ventricular systolic  function.  5. Normal tricuspid valve. Mild tricuspid regurgitation.  *** Compared with echocardiogram of 2022, there is no  longer an LV thrombus and no pericardial effusion.    Imaging Personally Reviewed:  < from: CT Angio Chest PE Protocol w/ IV Cont (23 @ 18:29) >    FINDINGS:  CTA: The study is technically adequate with a good contrast bolus to the   pulmonary arteries. No pulmonary embolism. The great vessels are normal   in size. The heart is normal in size. No pericardial effusion. Coronary   arterial and aortic valvular calcification is present.    Lungs/Airways/Pleura: Status post right upper lobectomy. Mildly increased   moderate loculated right pleural effusion since 2022. No   pneumothorax. A 1.7 cm solid nodule in the apicoposterior left upper lobe   on series 5, image 45 is unchanged since 2022 and enlarged since   2022 where it measured 4 mm.    Mediastinum/Lymph nodes: No thoracic adenopathy.    Upper Abdomen: Small volume ascites. Stable left adrenal nodularity,   previously characterized as an adenoma on abdominal MRI 8/10/2022.   Cholelithiasis.    Bones and Soft Tissues: No aggressive osseous lesions.    IMPRESSION:  No pulmonary embolism.    Mildly increased moderateloculated right pleural effusion since   2022. Status post post right upper lobectomy.    Indeterminate 1.7 cm apicoposterior left upper lobe nodule, enlarged   since 2022 (4 mm). Consider follow-up chest CT in 3 months, PET/CT   and/or tissue sampling for further evaluation.    Electrocardiogram Personally Reviewed:   Tele:    COORDINATION OF CARE:  Care Discussed with Consultants/Other Providers [Y/N]:  Prior or Outpatient Records Reviewed [Y/N]: *******************************  Pamella Brambila MD (PGY-1)  Internal Medicine  Contact via [employer] TEAMS  [facility] Pager: [phone]  [facility] Pager: [phone]  *******************************    PROGRESS NOTE:     Patient is a 77y old  Male who presents with a chief complaint of shortness of breath (2023 01:12)    INTERVAL EVENTS: Admitted overnight for SOB and acute heart failure exacerbation.    SUBJECTIVE: Patient seen and examined at bedside. This morning, the patient is comfortable and doing well. No acute complaints. Denies fevers, chills, N/V/D, chest pain, SOB, abdominal pain.    MEDICATIONS  (STANDING):  aspirin  chewable 81 milliGRAM(s) Oral daily  furosemide   Injectable 40 milliGRAM(s) IV Push <User Schedule>  latanoprost 0.005% Ophthalmic Solution 1 Drop(s) Both EYES at bedtime  metoprolol succinate ER 25 milliGRAM(s) Oral two times a day  mirtazapine 30 milliGRAM(s) Oral daily  spironolactone 12.5 milliGRAM(s) Oral daily  tamsulosin 0.4 milliGRAM(s) Oral at bedtime  venlafaxine XR. 150 milliGRAM(s) Oral daily    MEDICATIONS  (PRN):    CAPILLARY BLOOD GLUCOSE    I&O's Summary    PHYSICAL EXAM:  Vital Signs Last 24 Hrs  T(C): 36.3 (2023 05:24), Max: 36.6 (2023 01:00)  T(F): 97.3 (2023 05:24), Max: 97.9 (2023 01:00)  HR: 67 (2023 05:24) (37 - 75)  BP: 103/67 (2023 05:24) (86/60 - 112/83)  BP(mean): 93 (2023 21:00) (68 - 93)  RR: 18 (2023 05:24) (13 - 20)  SpO2: 98% (2023 05:24) (96% - 100%)    Parameters below as of 2023 05:24  Patient On (Oxygen Delivery Method): room air    GENERAL: NAD, lying in bed comfortably  NECK: +JVP up to mid-ear   HEART: S1, S2, Regular rate and rhythm, no murmurs, rubs, or gallops  LUNGS: decreased breath sounds at base of right lung. Unlabored respirations, no crackles, wheezing, or rhonchi  ABDOMEN: Soft, nontender, nondistended, +BS  EXTREMITIES: 2+ peripheral pulses bilaterally. 1-2+ LE edema  NERVOUS SYSTEM:  A&Ox3, no focal deficits   SKIN: No rashes or lesions    LABS:                        11.3   5.80  )-----------( 154      ( 2023 07:47 )             35.9     01-04    137  |  105  |  51<H>  ----------------------------<  90  4.0   |  19<L>  |  1.62<H>    Ca    9.0      2023 07:47  Phos  3.9       Mg     2.4         TPro  6.7  /  Alb  3.5  /  TBili  1.1  /  DBili  x   /  AST  45<H>  /  ALT  36  /  AlkPhos  147<H>      PT/INR - ( 2023 17:32 )   PT: 15.6 sec;   INR: 1.34 ratio    PTT - ( 2023 17:32 )  PTT:29.5 sec    Urinalysis Basic - ( 2023 21:11 )    Color: Yellow / Appearance: Clear / S.021 / pH: x  Gluc: x / Ketone: Negative  / Bili: Negative / Urobili: Negative   Blood: x / Protein: 30 mg/dL / Nitrite: Negative   Leuk Esterase: Negative / RBC: 1 /hpf / WBC 3 /HPF   Sq Epi: x / Non Sq Epi: 3 /hpf / Bacteria: Negative    RADIOLOGY & ADDITIONAL TESTS:  Results Reviewed:   < from: Transthoracic Echocardiogram (23 @ 07:34) >  ------------------------------------------------------------------------  Conclusions:  1. Normal mitral valve. Severe mitral regurgitation. The  EROA using flow convergence method is 0.24 cm2,  MR  regurgitant volume is 36 mL/beat. MR vena contracta is 0.51  cm.  2. Normal trileaflet aortic valve. Mild-moderate aortic  regurgitation.  3. Eccentric left ventricular hypertrophy (dilated left  ventricle with normal relative wall thickness).Severe  segmental left ventricular systolic dysfunction. LVEF  calculated using biplane Pizarro's method is 27%. Akinesis  of the apical, septal, inferior walls with remaining walls  hypokinetic.  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 Unable to determine diastolic function due to  mrerged E and A wave.  4. Severe right atrial enlargement.Right ventricular  enlargement with decreased right ventricular systolic  function.  5. Normal tricuspid valve. Mild tricuspid regurgitation.  *** Compared with echocardiogram of 2022, there is no  longer an LV thrombus and no pericardial effusion.    Imaging Personally Reviewed:  < from: CT Angio Chest PE Protocol w/ IV Cont (23 @ 18:29) >    FINDINGS:  CTA: The study is technically adequate with a good contrast bolus to the   pulmonary arteries. No pulmonary embolism. The great vessels are normal   in size. The heart is normal in size. No pericardial effusion. Coronary   arterial and aortic valvular calcification is present.    Lungs/Airways/Pleura: Status post right upper lobectomy. Mildly increased   moderate loculated right pleural effusion since 2022. No   pneumothorax. A 1.7 cm solid nodule in the apicoposterior left upper lobe   on series 5, image 45 is unchanged since 2022 and enlarged since   2022 where it measured 4 mm.    Mediastinum/Lymph nodes: No thoracic adenopathy.    Upper Abdomen: Small volume ascites. Stable left adrenal nodularity,   previously characterized as an adenoma on abdominal MRI 8/10/2022.   Cholelithiasis.    Bones and Soft Tissues: No aggressive osseous lesions.    IMPRESSION:  No pulmonary embolism.    Mildly increased moderateloculated right pleural effusion since   2022. Status post post right upper lobectomy.    Indeterminate 1.7 cm apicoposterior left upper lobe nodule, enlarged   since 2022 (4 mm). Consider follow-up chest CT in 3 months, PET/CT   and/or tissue sampling for further evaluation.    Electrocardiogram Personally Reviewed:   Tele:    COORDINATION OF CARE:  Care Discussed with Consultants/Other Providers [Y/N]:  Prior or Outpatient Records Reviewed [Y/N]:

## 2023-01-04 NOTE — H&P ADULT - HISTORY OF PRESENT ILLNESS
77 year old male with PMH significant for CAD with a silent MI this past May, CHF, lung nodule s/p partial right lobectomy c/b cardiogenic shock, and depression who presented due to worsening shortness of breath with exertion. Patient states that over the last 2 weeks he was slightly short o breath when walk or going up a flight of stairs; however, it became significantly worse in the last 3-4 days. Patient states that in the last 1-2 weeks he has had to prop himself up more when sleeping and would wake up 7-8 times in the middle of the night to walk around because he could not get comfortable in bed. Patient states that during this time he had some rhinorrhea, but not cough, nasal congestion, or sore throat. Patient denies any fever, chills, weakness, chest pain, palpitations, lightheadedness, cough, abdominal pain, nausea, vomiting, urinary frequency or pain, constipation, diarrhea, or worsening  77 year old male with PMH significant for CAD with a silent MI this past May, CHF, lung nodule s/p partial right lobectomy c/b cardiogenic shock, and depression who presented due to worsening shortness of breath with exertion. Patient states that over the last 2 weeks he was slightly short o breath when walk or going up a flight of stairs; however, it became significantly worse in the last 3-4 days. Patient states that in the last 1-2 weeks he has had to prop himself up more when sleeping and would wake up 7-8 times in the middle of the night to walk around because he could not get comfortable in bed. Patient states that during this time he had some rhinorrhea, but not cough, nasal congestion, or sore throat. Patient denies any fever, chills, weakness, chest pain, palpitations, lightheadedness, cough, abdominal pain, nausea, vomiting, urinary frequency or pain, constipation, diarrhea, or worsening lower extremity edema.    In the ED, patient's initial BP was 77 year old male with PMH significant for CAD with a silent MI this past May, CHF, lung nodule s/p partial right lobectomy c/b cardiogenic shock, and depression who presented due to worsening shortness of breath with exertion. Patient states that over the last 2 weeks he was slightly short o breath when walk or going up a flight of stairs; however, it became significantly worse in the last 3-4 days. Patient states that in the last 1-2 weeks he has had to prop himself up more when sleeping and would wake up 7-8 times in the middle of the night to walk around because he could not get comfortable in bed. Patient states that during this time he had some rhinorrhea, but not cough, nasal congestion, or sore throat. Patient denies any fever, chills, weakness, chest pain, palpitations, lightheadedness, cough, abdominal pain, nausea, vomiting, urinary frequency or pain, constipation, diarrhea, or worsening lower extremity edema.    In the ED, patient's temp was 97.6, HR was 71, BP 90/75, RR of 20, saturating 99% on room air. Labs were notable for an elevated pro- BNP of 5605, Troponin was slightly elevated to 52, but downtrended to 45, creatinine of 1.77, and a lactate of 2.5 off the VBG. Additionally an RVP was positive for entero/rhinovirus. A CTA of the chest showed a mildly loculated right pleural effusion and an indeterminate 1.7cm apicoposterior left upper lobe nodule.

## 2023-01-04 NOTE — H&P ADULT - PROBLEM SELECTOR PLAN 1
- most likely relate to acute on chronic CHF exacerbation 2/2 to entero/rhinovirus infection  - would diuresis with IV lasix 40mg daily with net goal of negative 500mL-1L  - daily standing weight   - strict IOs   - repeat echo  - prior echo from 11/28/22 showed EF of 22% with severe LVSD, decreased right ventricular systolic function, and severe pulmonary hypertension. - most likely relate to acute on chronic CHF exacerbation 2/2 to entero/rhinovirus infection  - would diuresis with IV lasix 40mg daily with net goal of negative 500mL-1L  - would hold beta blocker and ARB in setting of softer BPs, would continue home doses of Farxiga and Aldactone   - repeat echo  - prior echo from 11/28/22 showed EF of 22% with severe LVSD, decreased right ventricular systolic function, and severe pulmonary hypertension.  - strict IOs  - daily weights - most likely relate to acute on chronic CHF exacerbation 2/2 to entero/rhinovirus infection  - would diuresis with IV lasix 40mg daily with net goal of negative 500mL-1L  - would hold beta blocker and ARB in setting of softer BPs, would continue home doses of Farxiga and Aldactone   - repeat echo  - prior echo from 11/28/22 showed EF of 22% with severe LVSD, decreased right ventricular systolic function, and severe pulmonary hypertension.  - strict IOs  - daily weights  - will consult HF in the morning

## 2023-01-04 NOTE — CONSULT NOTE ADULT - PROBLEM SELECTOR RECOMMENDATION 3
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  However, MR severity has worsened progressively since then.  Echo from this admission now shows severe MR.   - may need further evaluation with LEN.

## 2023-01-04 NOTE — H&P ADULT - NSHPLABSRESULTS_GEN_ALL_CORE
LABS:                          12.8   6.37  )-----------( 191      ( 2023 17:32 )             41.2         139  |  105  |  52<H>  ----------------------------<  109<H>  4.7   |  18<L>  |  1.77<H>    Ca    9.7      2023 17:32  Mg     2.4         TPro  7.4  /  Alb  4.0  /  TBili  1.3<H>  /  DBili  x   /  AST  51<H>  /  ALT  38  /  AlkPhos  163<H>      LIVER FUNCTIONS - ( 2023 17:32 )  Alb: 4.0 g/dL / Pro: 7.4 g/dL / ALK PHOS: 163 U/L / ALT: 38 U/L / AST: 51 U/L / GGT: x           PT/INR - ( 2023 17:32 )   PT: 15.6 sec;   INR: 1.34 ratio         PTT - ( 2023 17:32 )  PTT:29.5 sec  Urinalysis Basic - ( 2023 21:11 )    Color: Yellow / Appearance: Clear / S.021 / pH: x  Gluc: x / Ketone: Negative  / Bili: Negative / Urobili: Negative   Blood: x / Protein: 30 mg/dL / Nitrite: Negative   Leuk Esterase: Negative / RBC: 1 /hpf / WBC 3 /HPF   Sq Epi: x / Non Sq Epi: 3 /hpf / Bacteria: Negative

## 2023-01-04 NOTE — CONSULT NOTE ADULT - PROBLEM SELECTOR RECOMMENDATION 9
- hx of HFrEF EF 22%  - continue Toprol XL 25 mg PO BID  - continue Aldactone 12.5 mg PO QD - hx of HFrEF EF 22%  - continue Toprol XL 25 mg PO BID  - plan to start low dose Entresto 24-26 mg BID (first dose tomorrow AM)  - continue Aldactone 12.5 mg PO QD  - make Lasix 40 mg IV QD  - holding off on Farxiga at this time   - obtain daily standing weights  - strict I&Os

## 2023-01-04 NOTE — H&P ADULT - PROBLEM SELECTOR PLAN 7
- Ethics: FULL CODE  - Diet:: DASH/TLC  - DVT ppx: heparin subcutaneous  - Dispo: medically active - continue with flomax

## 2023-01-04 NOTE — CONSULT NOTE ADULT - SUBJECTIVE AND OBJECTIVE BOX
PULMONARY SERVICE INITIAL CONSULT NOTE    HPI:  77 year old male with PMH significant for CAD with a silent MI this past May, CHF, lung nodule s/p partial right lobectomy c/b cardiogenic shock, and depression who presented due to worsening shortness of breath with exertion. Patient states that over the last 2 weeks he was slightly short o breath when walk or going up a flight of stairs; however, it became significantly worse in the last 3-4 days. Patient states that in the last 1-2 weeks he has had to prop himself up more when sleeping and would wake up 7-8 times in the middle of the night to walk around because he could not get comfortable in bed. Patient states that during this time he had some rhinorrhea, but not cough, nasal congestion, or sore throat. Patient denies any fever, chills, weakness, chest pain, palpitations, lightheadedness, cough, abdominal pain, nausea, vomiting, urinary frequency or pain, constipation, diarrhea, or worsening lower extremity edema.    In the ED, patient's temp was 97.6, HR was 71, BP 90/75, RR of 20, saturating 99% on room air. Labs were notable for an elevated pro- BNP of 5605, Troponin was slightly elevated to 52, but downtrended to 45, creatinine of 1.77, and a lactate of 2.5 off the VBG. Additionally an RVP was positive for entero/rhinovirus. A CTA of the chest showed a mildly loculated right pleural effusion and an indeterminate 1.7cm apicoposterior left upper lobe nodule. (2023 01:12)      Patient seen and examined at bedside.     REVIEW OF SYSTEMS:  All additional ROS negative.    PAST MEDICAL & SURGICAL HISTORY:  BPH (benign prostatic hyperplasia)      Bipolar disorder      Depression      Anxiety      Umbilical hernia, incarcerated      Inguinal hernia of right side without obstruction or gangrene      Depression      Constipation      Urinary retention      CAD (coronary artery disease)      SCC (squamous cell carcinoma)      Lung mass      Other nonspecific abnormal finding of lung field      LV (left ventricular) mural thrombus      History of inguinal hernia      Severe left ventricular systolic dysfunction      History of CHF (congestive heart failure)      History of arthroscopy of knee  Right,       History of tonsillectomy        History of hernia repair      H/O coronary angiogram          FAMILY HISTORY:  Family history of depression (Mother)    FH: CAD (coronary artery disease) (Sibling, Sibling)    Family history of diabetes mellitus (DM) (Sibling)        SOCIAL HISTORY:  Smoking Status: [ ] Current, [ ] Former, [ ] Never  Pack Years:    MEDICATIONS:  Pulmonary:    Antimicrobials:    Anticoagulants:  aspirin  chewable 81 milliGRAM(s) Oral daily  clopidogrel Tablet 75 milliGRAM(s) Oral daily    Onc:    GI/:  tamsulosin 0.4 milliGRAM(s) Oral at bedtime    Endocrine:    Cardiac:  furosemide   Injectable 40 milliGRAM(s) IV Push <User Schedule>  metoprolol succinate ER 25 milliGRAM(s) Oral two times a day  spironolactone 12.5 milliGRAM(s) Oral daily    Other Medications:  latanoprost 0.005% Ophthalmic Solution 1 Drop(s) Both EYES at bedtime  mirtazapine 30 milliGRAM(s) Oral daily  venlafaxine XR. 150 milliGRAM(s) Oral daily      Allergies    No Known Allergies    Intolerances        PHYSICAL EXAM  Vital Signs Last 24 Hrs  T(C): 36.3 (2023 12:34), Max: 36.6 (2023 01:00)  T(F): 97.4 (2023 12:34), Max: 97.9 (2023 01:00)  HR: 82 (2023 12:34) (37 - 82)  BP: 109/73 (2023 12:34) (86/60 - 112/83)  BP(mean): 93 (2023 21:00) (68 - 93)  RR: 17 (2023 12:34) (13 - 20)  SpO2: 97% (2023 12:34) (96% - 100%)    Parameters below as of 2023 12:34  Patient On (Oxygen Delivery Method): room air            CONSTITUTIONAL: No acute distress.   HEENT:  Conjunctiva clear B/L.  Moist oral mucosa.   Cardiovascular: RRR with no murmurs. No JVD noted. No lower extremity edema B/L. Extremities are warm and well perfused.    Respiratory: Lungs CTAB. No wrr. No accessory muscle use.   Gastrointestinal:  Soft, nontender. Non-distended. Non-rigid.    Neurologic:  Alert and awake. Moving all extremities. Following commands.    Skin:  No gross rashes notes.      LABS:      CBC Full  -  ( 2023 07:47 )  WBC Count : 5.80 K/uL  RBC Count : 3.84 M/uL  Hemoglobin : 11.3 g/dL  Hematocrit : 35.9 %  Platelet Count - Automated : 154 K/uL  Mean Cell Volume : 93.5 fl  Mean Cell Hemoglobin : 29.4 pg  Mean Cell Hemoglobin Concentration : 31.5 gm/dL  Auto Neutrophil # : x  Auto Lymphocyte # : x  Auto Monocyte # : x  Auto Eosinophil # : x  Auto Basophil # : x  Auto Neutrophil % : x  Auto Lymphocyte % : x  Auto Monocyte % : x  Auto Eosinophil % : x  Auto Basophil % : x        137  |  105  |  51<H>  ----------------------------<  90  4.0   |  19<L>  |  1.62<H>    Ca    9.0      2023 07:47  Phos  3.9       Mg     2.4         TPro  6.7  /  Alb  3.5  /  TBili  1.1  /  DBili  x   /  AST  45<H>  /  ALT  36  /  AlkPhos  147<H>      PT/INR - ( 2023 17:32 )   PT: 15.6 sec;   INR: 1.34 ratio         PTT - ( 2023 17:32 )  PTT:29.5 sec      Urinalysis Basic - ( 2023 21:11 )    Color: Yellow / Appearance: Clear / S.021 / pH: x  Gluc: x / Ketone: Negative  / Bili: Negative / Urobili: Negative   Blood: x / Protein: 30 mg/dL / Nitrite: Negative   Leuk Esterase: Negative / RBC: 1 /hpf / WBC 3 /HPF   Sq Epi: x / Non Sq Epi: 3 /hpf / Bacteria: Negative                RADIOLOGY & ADDITIONAL STUDIES: PULMONARY SERVICE INITIAL CONSULT NOTE    HPI:  77 year old male with PMH significant for CAD with a silent MI this past May, CHF, lung nodule s/p partial right lobectomy c/b cardiogenic shock, and depression who presented due to worsening shortness of breath with exertion. Patient states that over the last 2 weeks he was slightly short o breath when walk or going up a flight of stairs; however, it became significantly worse in the last 3-4 days. Patient states that in the last 1-2 weeks he has had to prop himself up more when sleeping and would wake up 7-8 times in the middle of the night to walk around because he could not get comfortable in bed. Patient states that during this time he had some rhinorrhea, but not cough, nasal congestion, or sore throat. Patient denies any fever, chills, weakness, chest pain, palpitations, lightheadedness, cough, abdominal pain, nausea, vomiting, urinary frequency or pain, constipation, diarrhea, or worsening lower extremity edema.    In the ED, patient's temp was 97.6, HR was 71, BP 90/75, RR of 20, saturating 99% on room air. Labs were notable for an elevated pro- BNP of 5605, Troponin was slightly elevated to 52, but downtrended to 45, creatinine of 1.77, and a lactate of 2.5 off the VBG. Additionally an RVP was positive for entero/rhinovirus. A CTA of the chest showed a mildly loculated right pleural effusion and an indeterminate 1.7cm apicoposterior left upper lobe nodule.     Patient seen and examined at bedside. Patient appears comfortable on room air. He does not report any shortness of breath at rest. He reports dyspnea on exertion and increased lower extremity swelling, improved from prior. He reports having good urine output since coming to the hospital, responding well to diuretics. Denies any cough, fever, chills, recent pneumonia.       REVIEW OF SYSTEMS:  All additional ROS negative.    PAST MEDICAL & SURGICAL HISTORY:  BPH (benign prostatic hyperplasia)      Bipolar disorder      Depression      Anxiety      Umbilical hernia, incarcerated      Inguinal hernia of right side without obstruction or gangrene      Depression      Constipation      Urinary retention      CAD (coronary artery disease)      SCC (squamous cell carcinoma)      Lung mass      Other nonspecific abnormal finding of lung field      LV (left ventricular) mural thrombus      History of inguinal hernia      Severe left ventricular systolic dysfunction      History of CHF (congestive heart failure)      History of arthroscopy of knee  Right,       History of tonsillectomy        History of hernia repair      H/O coronary angiogram      FAMILY HISTORY:  Family history of depression (Mother)    FH: CAD (coronary artery disease) (Sibling, Sibling)    Family history of diabetes mellitus (DM) (Sibling)        SOCIAL HISTORY:  Smoking Status: [ ] Current, [ ] Former, [ ] Never  Pack Years:    MEDICATIONS:  Pulmonary:    Antimicrobials:    Anticoagulants:  aspirin  chewable 81 milliGRAM(s) Oral daily  clopidogrel Tablet 75 milliGRAM(s) Oral daily    Onc:    GI/:  tamsulosin 0.4 milliGRAM(s) Oral at bedtime    Endocrine:    Cardiac:  furosemide   Injectable 40 milliGRAM(s) IV Push <User Schedule>  metoprolol succinate ER 25 milliGRAM(s) Oral two times a day  spironolactone 12.5 milliGRAM(s) Oral daily    Other Medications:  latanoprost 0.005% Ophthalmic Solution 1 Drop(s) Both EYES at bedtime  mirtazapine 30 milliGRAM(s) Oral daily  venlafaxine XR. 150 milliGRAM(s) Oral daily      Allergies    No Known Allergies    Intolerances        PHYSICAL EXAM  Vital Signs Last 24 Hrs  T(C): 36.3 (2023 12:34), Max: 36.6 (2023 01:00)  T(F): 97.4 (2023 12:34), Max: 97.9 (2023 01:00)  HR: 82 (2023 12:34) (37 - 82)  BP: 109/73 (2023 12:34) (86/60 - 112/83)  BP(mean): 93 (2023 21:00) (68 - 93)  RR: 17 (2023 12:34) (13 - 20)  SpO2: 97% (2023 12:34) (96% - 100%)    Parameters below as of 2023 12:34  Patient On (Oxygen Delivery Method): room air            CONSTITUTIONAL: No acute distress.   HEENT:  Conjunctiva clear B/L.  Moist oral mucosa.   Cardiovascular: RRR with no murmurs. No JVD noted. No lower extremity edema B/L. Extremities are warm and well perfused.    Respiratory: Lungs CTAB. No wrr. No accessory muscle use.   Gastrointestinal:  Soft, nontender. Non-distended. Non-rigid.    Neurologic:  Alert and awake. Moving all extremities. Following commands.    Skin:  No gross rashes notes.      LABS:      CBC Full  -  ( 2023 07:47 )  WBC Count : 5.80 K/uL  RBC Count : 3.84 M/uL  Hemoglobin : 11.3 g/dL  Hematocrit : 35.9 %  Platelet Count - Automated : 154 K/uL  Mean Cell Volume : 93.5 fl  Mean Cell Hemoglobin : 29.4 pg  Mean Cell Hemoglobin Concentration : 31.5 gm/dL  Auto Neutrophil # : x  Auto Lymphocyte # : x  Auto Monocyte # : x  Auto Eosinophil # : x  Auto Basophil # : x  Auto Neutrophil % : x  Auto Lymphocyte % : x  Auto Monocyte % : x  Auto Eosinophil % : x  Auto Basophil % : x        137  |  105  |  51<H>  ----------------------------<  90  4.0   |  19<L>  |  1.62<H>    Ca    9.0      2023 07:47  Phos  3.9       Mg     2.4         TPro  6.7  /  Alb  3.5  /  TBili  1.1  /  DBili  x   /  AST  45<H>  /  ALT  36  /  AlkPhos  147<H>      PT/INR - ( 2023 17:32 )   PT: 15.6 sec;   INR: 1.34 ratio         PTT - ( 2023 17:32 )  PTT:29.5 sec      Urinalysis Basic - ( 2023 21:11 )    Color: Yellow / Appearance: Clear / S.021 / pH: x  Gluc: x / Ketone: Negative  / Bili: Negative / Urobili: Negative   Blood: x / Protein: 30 mg/dL / Nitrite: Negative   Leuk Esterase: Negative / RBC: 1 /hpf / WBC 3 /HPF   Sq Epi: x / Non Sq Epi: 3 /hpf / Bacteria: Negative                RADIOLOGY & ADDITIONAL STUDIES: PULMONARY SERVICE INITIAL CONSULT NOTE    HPI:  77 year old male with PMH significant for CAD with a silent MI this past May, CHF, lung nodule s/p partial right lobectomy c/b cardiogenic shock, and depression who presented due to worsening shortness of breath with exertion. Patient states that over the last 2 weeks he was slightly short o breath when walk or going up a flight of stairs; however, it became significantly worse in the last 3-4 days. Patient states that in the last 1-2 weeks he has had to prop himself up more when sleeping and would wake up 7-8 times in the middle of the night to walk around because he could not get comfortable in bed. Patient states that during this time he had some rhinorrhea, but not cough, nasal congestion, or sore throat. Patient denies any fever, chills, weakness, chest pain, palpitations, lightheadedness, cough, abdominal pain, nausea, vomiting, urinary frequency or pain, constipation, diarrhea, or worsening lower extremity edema.    In the ED, patient's temp was 97.6, HR was 71, BP 90/75, RR of 20, saturating 99% on room air. Labs were notable for an elevated pro- BNP of 5605, Troponin was slightly elevated to 52, but downtrended to 45, creatinine of 1.77, and a lactate of 2.5 off the VBG. Additionally an RVP was positive for entero/rhinovirus. A CTA of the chest showed a mildly loculated right pleural effusion and an indeterminate 1.7cm apicoposterior left upper lobe nodule.     Patient seen and examined at bedside. Patient appears comfortable on room air. He does not report any shortness of breath at rest. He reports dyspnea on exertion and increased lower extremity swelling, improved from prior. He reports having good urine output since coming to the hospital, responding well to diuretics. Denies any cough, fever, chills, recent pneumonia.       REVIEW OF SYSTEMS:  All additional ROS negative.    PAST MEDICAL & SURGICAL HISTORY:  BPH (benign prostatic hyperplasia)      Bipolar disorder      Depression      Anxiety      Umbilical hernia, incarcerated      Inguinal hernia of right side without obstruction or gangrene      Depression      Constipation      Urinary retention      CAD (coronary artery disease)      SCC (squamous cell carcinoma)      Lung mass      Other nonspecific abnormal finding of lung field      LV (left ventricular) mural thrombus      History of inguinal hernia      Severe left ventricular systolic dysfunction      History of CHF (congestive heart failure)      History of arthroscopy of knee  Right,       History of tonsillectomy        History of hernia repair      H/O coronary angiogram      FAMILY HISTORY:  Family history of depression (Mother)    FH: CAD (coronary artery disease) (Sibling, Sibling)    Family history of diabetes mellitus (DM) (Sibling)        SOCIAL HISTORY:  Smoking Status: [ ] Current, [ ] Former, [ ] Never  Pack Years:    MEDICATIONS:  Pulmonary:    Antimicrobials:    Anticoagulants:  aspirin  chewable 81 milliGRAM(s) Oral daily  clopidogrel Tablet 75 milliGRAM(s) Oral daily    Onc:    GI/:  tamsulosin 0.4 milliGRAM(s) Oral at bedtime    Endocrine:    Cardiac:  furosemide   Injectable 40 milliGRAM(s) IV Push <User Schedule>  metoprolol succinate ER 25 milliGRAM(s) Oral two times a day  spironolactone 12.5 milliGRAM(s) Oral daily    Other Medications:  latanoprost 0.005% Ophthalmic Solution 1 Drop(s) Both EYES at bedtime  mirtazapine 30 milliGRAM(s) Oral daily  venlafaxine XR. 150 milliGRAM(s) Oral daily      Allergies    No Known Allergies    Intolerances        PHYSICAL EXAM  Vital Signs Last 24 Hrs  T(C): 36.3 (2023 12:34), Max: 36.6 (2023 01:00)  T(F): 97.4 (2023 12:34), Max: 97.9 (2023 01:00)  HR: 82 (2023 12:34) (37 - 82)  BP: 109/73 (2023 12:34) (86/60 - 112/83)  BP(mean): 93 (2023 21:00) (68 - 93)  RR: 17 (2023 12:34) (13 - 20)  SpO2: 97% (2023 12:34) (96% - 100%)    Parameters below as of 2023 12:34  Patient On (Oxygen Delivery Method): room air            CONSTITUTIONAL: No acute distress.   HEENT:  Conjunctiva clear B/L.  Moist oral mucosa.   Cardiovascular: 2+ pitting edema to thighs   Respiratory: Lungs decreased breath sounds in RLL, otherwise CTAB  Gastrointestinal:  Soft, nontender. Non-distended. Non-rigid.    Neurologic:  Alert and awake. Moving all extremities. Following commands.    Skin:  No gross rashes notes.      LABS:      CBC Full  -  ( 2023 07:47 )  WBC Count : 5.80 K/uL  RBC Count : 3.84 M/uL  Hemoglobin : 11.3 g/dL  Hematocrit : 35.9 %  Platelet Count - Automated : 154 K/uL  Mean Cell Volume : 93.5 fl  Mean Cell Hemoglobin : 29.4 pg  Mean Cell Hemoglobin Concentration : 31.5 gm/dL  Auto Neutrophil # : x  Auto Lymphocyte # : x  Auto Monocyte # : x  Auto Eosinophil # : x  Auto Basophil # : x  Auto Neutrophil % : x  Auto Lymphocyte % : x  Auto Monocyte % : x  Auto Eosinophil % : x  Auto Basophil % : x        137  |  105  |  51<H>  ----------------------------<  90  4.0   |  19<L>  |  1.62<H>    Ca    9.0      2023 07:47  Phos  3.9       Mg     2.4         TPro  6.7  /  Alb  3.5  /  TBili  1.1  /  DBili  x   /  AST  45<H>  /  ALT  36  /  AlkPhos  147<H>      PT/INR - ( 2023 17:32 )   PT: 15.6 sec;   INR: 1.34 ratio         PTT - ( 2023 17:32 )  PTT:29.5 sec      Urinalysis Basic - ( 2023 21:11 )    Color: Yellow / Appearance: Clear / S.021 / pH: x  Gluc: x / Ketone: Negative  / Bili: Negative / Urobili: Negative   Blood: x / Protein: 30 mg/dL / Nitrite: Negative   Leuk Esterase: Negative / RBC: 1 /hpf / WBC 3 /HPF   Sq Epi: x / Non Sq Epi: 3 /hpf / Bacteria: Negative                RADIOLOGY & ADDITIONAL STUDIES:

## 2023-01-05 ENCOUNTER — APPOINTMENT (OUTPATIENT)
Dept: ELECTROPHYSIOLOGY | Facility: CLINIC | Age: 78
End: 2023-01-05

## 2023-01-05 ENCOUNTER — APPOINTMENT (OUTPATIENT)
Dept: HEART FAILURE | Facility: CLINIC | Age: 78
End: 2023-01-05

## 2023-01-05 LAB
ANION GAP SERPL CALC-SCNC: 14 MMOL/L — SIGNIFICANT CHANGE UP (ref 5–17)
BUN SERPL-MCNC: 45 MG/DL — HIGH (ref 7–23)
CALCIUM SERPL-MCNC: 8.8 MG/DL — SIGNIFICANT CHANGE UP (ref 8.4–10.5)
CHLORIDE SERPL-SCNC: 104 MMOL/L — SIGNIFICANT CHANGE UP (ref 96–108)
CO2 SERPL-SCNC: 20 MMOL/L — LOW (ref 22–31)
CREAT SERPL-MCNC: 1.6 MG/DL — HIGH (ref 0.5–1.3)
EGFR: 44 ML/MIN/1.73M2 — LOW
GLUCOSE SERPL-MCNC: 94 MG/DL — SIGNIFICANT CHANGE UP (ref 70–99)
HCT VFR BLD CALC: 36.7 % — LOW (ref 39–50)
HGB BLD-MCNC: 11.7 G/DL — LOW (ref 13–17)
MCHC RBC-ENTMCNC: 29.3 PG — SIGNIFICANT CHANGE UP (ref 27–34)
MCHC RBC-ENTMCNC: 31.9 GM/DL — LOW (ref 32–36)
MCV RBC AUTO: 92 FL — SIGNIFICANT CHANGE UP (ref 80–100)
NRBC # BLD: 0 /100 WBCS — SIGNIFICANT CHANGE UP (ref 0–0)
PHOSPHATE SERPL-MCNC: 3.6 MG/DL — SIGNIFICANT CHANGE UP (ref 2.5–4.5)
PLATELET # BLD AUTO: 162 K/UL — SIGNIFICANT CHANGE UP (ref 150–400)
POTASSIUM SERPL-MCNC: 3.7 MMOL/L — SIGNIFICANT CHANGE UP (ref 3.5–5.3)
POTASSIUM SERPL-SCNC: 3.7 MMOL/L — SIGNIFICANT CHANGE UP (ref 3.5–5.3)
RBC # BLD: 3.99 M/UL — LOW (ref 4.2–5.8)
RBC # FLD: 17.4 % — HIGH (ref 10.3–14.5)
SODIUM SERPL-SCNC: 138 MMOL/L — SIGNIFICANT CHANGE UP (ref 135–145)
WBC # BLD: 5.78 K/UL — SIGNIFICANT CHANGE UP (ref 3.8–10.5)
WBC # FLD AUTO: 5.78 K/UL — SIGNIFICANT CHANGE UP (ref 3.8–10.5)

## 2023-01-05 PROCEDURE — 99235 HOSP IP/OBS SAME DATE MOD 70: CPT

## 2023-01-05 PROCEDURE — 99233 SBSQ HOSP IP/OBS HIGH 50: CPT | Mod: GC

## 2023-01-05 RX ORDER — SPIRONOLACTONE 25 MG/1
12.5 TABLET, FILM COATED ORAL DAILY
Refills: 0 | Status: DISCONTINUED | OUTPATIENT
Start: 2023-01-05 | End: 2023-01-10

## 2023-01-05 RX ORDER — SACUBITRIL AND VALSARTAN 24; 26 MG/1; MG/1
1 TABLET, FILM COATED ORAL
Refills: 0 | Status: DISCONTINUED | OUTPATIENT
Start: 2023-01-05 | End: 2023-01-10

## 2023-01-05 RX ORDER — ZOLPIDEM TARTRATE 10 MG/1
5 TABLET ORAL AT BEDTIME
Refills: 0 | Status: DISCONTINUED | OUTPATIENT
Start: 2023-01-05 | End: 2023-01-10

## 2023-01-05 RX ORDER — METOPROLOL TARTRATE 50 MG
25 TABLET ORAL
Refills: 0 | Status: DISCONTINUED | OUTPATIENT
Start: 2023-01-05 | End: 2023-01-10

## 2023-01-05 RX ORDER — FUROSEMIDE 40 MG
40 TABLET ORAL
Refills: 0 | Status: DISCONTINUED | OUTPATIENT
Start: 2023-01-06 | End: 2023-01-08

## 2023-01-05 RX ADMIN — ZOLPIDEM TARTRATE 5 MILLIGRAM(S): 10 TABLET ORAL at 23:26

## 2023-01-05 RX ADMIN — Medication 25 MILLIGRAM(S): at 17:12

## 2023-01-05 RX ADMIN — SACUBITRIL AND VALSARTAN 1 TABLET(S): 24; 26 TABLET, FILM COATED ORAL at 17:13

## 2023-01-05 RX ADMIN — Medication 40 MILLIGRAM(S): at 06:45

## 2023-01-05 RX ADMIN — Medication 81 MILLIGRAM(S): at 12:25

## 2023-01-05 RX ADMIN — TAMSULOSIN HYDROCHLORIDE 0.4 MILLIGRAM(S): 0.4 CAPSULE ORAL at 21:30

## 2023-01-05 RX ADMIN — LATANOPROST 1 DROP(S): 0.05 SOLUTION/ DROPS OPHTHALMIC; TOPICAL at 21:31

## 2023-01-05 RX ADMIN — SPIRONOLACTONE 12.5 MILLIGRAM(S): 25 TABLET, FILM COATED ORAL at 21:50

## 2023-01-05 RX ADMIN — Medication 25 MILLIGRAM(S): at 06:46

## 2023-01-05 RX ADMIN — MIRTAZAPINE 30 MILLIGRAM(S): 45 TABLET, ORALLY DISINTEGRATING ORAL at 21:30

## 2023-01-05 RX ADMIN — Medication 150 MILLIGRAM(S): at 12:24

## 2023-01-05 RX ADMIN — Medication 40 MILLIGRAM(S): at 14:32

## 2023-01-05 RX ADMIN — SACUBITRIL AND VALSARTAN 1 TABLET(S): 24; 26 TABLET, FILM COATED ORAL at 06:46

## 2023-01-05 NOTE — PROGRESS NOTE ADULT - ASSESSMENT
77 year old male with PMH significant for CAD with a silent MI this past May,  ICM (HFrEF 20-25), lung nodule s/p partial right lobectomy presented to [facility] with shortness of breath and volume overloaded. He underwent CT Chest which showed new left upper lobe nodule and right pleural effusion. On exam he is noted to have elevated JVP and BRODY likely related to congestion. He is currently being diuresis with IV diuretics and will optimize his GDMT as his pressure allows. He will need further workup regarding this new lung nodule. HF consulted for assistance in management of CHF.     Cardiac Testing:  Echo 1/4/2023: LVEF 27%, LVEDD 6.5cm, LVEDS 5.1cm, decreased RV function, severe MR (VC 0.5cm, MR vol 36mL. EROA 0.24cm), severe bi-AE  RHC 5/2022: RA 15, PA 56/134 (41), PCWP 32 with V waves to 40s, PA sat 59%, CO 3, CI 1.7

## 2023-01-05 NOTE — PROGRESS NOTE ADULT - SUBJECTIVE AND OBJECTIVE BOX
Patient seen and examined at bedside.    Overnight Events:   Tired but SOB improved. No other events overnight.     REVIEW OF SYSTEMS:  Constitutional:     [x ] negative [ ] fevers [ ] chills [ ] weight loss [ ] weight gain  HEENT:                  [x ] negative [ ] dry eyes [ ] eye irritation [ ] postnasal drip [ ] nasal congestion  CV:                         [ x] negative  [ ] chest pain [ ] orthopnea [ ] palpitations [ ] murmur  Resp:                     [x ] negative [ ] cough [ ] shortness of breath [ ] dyspnea [ ] wheezing [ ] sputum [ ]hemoptysis  GI:                          [x ] negative [ ] nausea [ ] vomiting [ ] diarrhea [ ] constipation [ ] abd pain [ ] dysphagia   :                        [ x] negative [ ] dysuria [ ] nocturia [ ] hematuria [ ] increased urinary frequency  Musculoskeletal: [x ] negative [ ] back pain [ ] myalgias [ ] arthralgias [ ] fracture  Skin:                       [ x] negative [ ] rash [ ] itch  Neurological:        [ x] negative [ ] headache [ ] dizziness [ ] syncope [ ] weakness [ ] numbness  Psychiatric:           [ x] negative [ ] anxiety [ ] depression  Endocrine:            [ x] negative [ ] diabetes [ ] thyroid problem  Heme/Lymph:      [ x] negative [ ] anemia [ ] bleeding problem  Allergic/Immune: [ x] negative [ ] itchy eyes [ ] nasal discharge [ ] hives [ ] angioedema    [ x] All other systems negative          Current Meds:  aspirin  chewable 81 milliGRAM(s) Oral daily  latanoprost 0.005% Ophthalmic Solution 1 Drop(s) Both EYES at bedtime  metoprolol succinate ER 25 milliGRAM(s) Oral two times a day  mirtazapine 30 milliGRAM(s) Oral daily  sacubitril 24 mG/valsartan 26 mG 1 Tablet(s) Oral two times a day  spironolactone 12.5 milliGRAM(s) Oral daily  tamsulosin 0.4 milliGRAM(s) Oral at bedtime  venlafaxine XR. 150 milliGRAM(s) Oral daily      Vitals:  T(F): 97.2 (01-05), Max: 97.9 (01-04)  HR: 76 (01-05) (75 - 86)  BP: 99/65 (01-05) (90/55 - 114/76)  RR: 18 (01-05)  SpO2: 96% (01-05)  I&O's Summary    05 Jan 2023 07:01  -  05 Jan 2023 15:33  --------------------------------------------------------  IN: 240 mL / OUT: 700 mL / NET: -460 mL      PHYSICAL EXAM:  Appearance: No acute distress; well appearing  Eyes: EOMI, no scleral icterus   HEENT: Normal oral mucosa  Cardiovascular: RRR, S1, S2, no murmurs, rubs, or gallops; 1+ b/l LE edema; +JVP to mid neck  Respiratory: Clear to auscultation bilaterally, no auditory stridor   Gastrointestinal: soft, non-tender, non-distended with normal bowel sounds  Musculoskeletal: No clubbing; no joint deformity   Neurologic: Moving all 4 extremities spontaneously, sensation grossly intact  Psychiatry: AAOx3, mood & affect appropriate  Skin: No rashes, ecchymoses, or cyanosis                          11.7   5.78  )-----------( 162      ( 05 Jan 2023 07:27 )             36.7     01-05    138  |  104  |  45<H>  ----------------------------<  94  3.7   |  20<L>  |  1.60<H>    Ca    8.8      05 Jan 2023 07:25  Phos  3.6     01-05  Mg     2.4     01-03    TPro  6.7  /  Alb  3.5  /  TBili  1.1  /  DBili  x   /  AST  45<H>  /  ALT  36  /  AlkPhos  147<H>  01-04    PT/INR - ( 03 Jan 2023 17:32 )   PT: 15.6 sec;   INR: 1.34 ratio         PTT - ( 03 Jan 2023 17:32 )  PTT:29.5 sec  CARDIAC MARKERS ( 03 Jan 2023 22:45 )  45 ng/L / x     / x     / x     / x     / x      CARDIAC MARKERS ( 03 Jan 2023 17:32 )  52 ng/L / x     / x     / x     / x     / x          Serum Pro-Brain Natriuretic Peptide: 5606 pg/mL (01-03 @ 17:32)

## 2023-01-05 NOTE — PROGRESS NOTE ADULT - PROBLEM SELECTOR PLAN 3
SCr on admission 1.77, baseline ~0.8; likely pre-renal i/s/o cardiorenal syndrome  - f/u urine studies   - c/w IV Lasix 40mg BID for continued diuresis, will reassess volume status and switch to daily dosing  - avoid nephrotoxic meds and renally dose medications   - monitor creatinine daily SCr on admission 1.77, baseline ~0.8; likely i/s/o cardiorenal syndrome  - urine urea 350, UCr 28, FEUrea 40.4%  - decreased IV Lasix to 40mg qd for continued diuresis  - avoid nephrotoxic meds and renally dose medications   - monitor creatinine daily

## 2023-01-05 NOTE — PROGRESS NOTE ADULT - ASSESSMENT
77M with CAD s/p silent MI in May 2022, HFrEF (EF 22% in 11/2022), lung nodule s/p partial right lobectomy c/b cardiogenic shock, and depression who presented due to worsening shortness of breath with exertion x 2 weeks, orthopnea, paroxysmal nocturnal dyspnea, found to have elevated pro-BNP, +JVP, +entero/rhinovirus w/ imaging showing R pleural effusion. Presentation likely 2/2 acute on chronic CHF exacerbation i/s/o entero/rhinovirus.

## 2023-01-05 NOTE — PROGRESS NOTE ADULT - PROBLEM SELECTOR PLAN 5
CTA (1/3) showing 1.7cm solid nodule in apicoposterior left upper lobe, enlarged since July 2022 (4mm). Pt w/ hx of lung nodule s/p partial right lobectomy w/ path showing adenocarcinoma  - pulm consulted for possible biopsy; appreciate recs  - pulm to discuss w/ interventional pulm regarding biopsy  - if no inpatient workup, then consider follow-up chest CT in 3 months, PET/CT outpatient CTA (1/3) showing 1.7cm solid nodule in apicoposterior left upper lobe, enlarged since July 2022 (4mm). Pt w/ hx of lung nodule s/p partial right lobectomy w/ path showing adenocarcinoma  - pulm consulted for possible biopsy; appreciate recs- to discuss w/ interventional pulm regarding biopsy  - Dr. Padilla's team aware per wife request  - if no inpatient workup, then consider follow-up chest CT in 3 months, PET/CT outpatient

## 2023-01-05 NOTE — PROGRESS NOTE ADULT - SUBJECTIVE AND OBJECTIVE BOX
*******************************  Pamella Brambila MD (PGY-1)  Internal Medicine  Contact via [employer] TEAMS  [facility] Pager: [phone]  [facility] Pager: [phone]  *******************************    PROGRESS NOTE:     Patient is a 77y old  Male who presents with a chief complaint of shortness of breath (2023 15:21)    INTERVAL EVENTS: No acute overnight events.     SUBJECTIVE: Patient seen and examined at bedside. This morning, the patient is comfortable and doing well. No acute complaints. Denies fevers, chills, N/V/D, chest pain, SOB, abdominal pain.    MEDICATIONS  (STANDING):  aspirin  chewable 81 milliGRAM(s) Oral daily  furosemide   Injectable 40 milliGRAM(s) IV Push <User Schedule>  latanoprost 0.005% Ophthalmic Solution 1 Drop(s) Both EYES at bedtime  metoprolol succinate ER 25 milliGRAM(s) Oral two times a day  mirtazapine 30 milliGRAM(s) Oral daily  sacubitril 24 mG/valsartan 26 mG 1 Tablet(s) Oral two times a day  spironolactone 12.5 milliGRAM(s) Oral daily  tamsulosin 0.4 milliGRAM(s) Oral at bedtime  venlafaxine XR. 150 milliGRAM(s) Oral daily    MEDICATIONS  (PRN):    CAPILLARY BLOOD GLUCOSE    I&O's Summary    PHYSICAL EXAM:  Vital Signs Last 24 Hrs  T(C): 36.3 (2023 05:35), Max: 36.6 (2023 18:25)  T(F): 97.3 (2023 05:35), Max: 97.9 (2023 18:25)  HR: 75 (2023 05:35) (75 - 86)  BP: 90/55 (2023 05:35) (90/55 - 114/76)  BP(mean): --  RR: 18 (2023 05:35) (17 - 18)  SpO2: 96% (2023 05:35) (96% - 100%)    Parameters below as of 2023 05:35  Patient On (Oxygen Delivery Method): room air    GENERAL: NAD, lying in bed comfortably  NECK: +JVP up to mandible  HEART: S1, S2, Regular rate and rhythm, no murmurs, rubs, or gallops  LUNGS: decreased breath sounds at base of right lung. Unlabored respirations, no crackles, wheezing, or rhonchi  ABDOMEN: Soft, nontender, nondistended, +BS  EXTREMITIES: 2+ peripheral pulses bilaterally. 1-2+ LE edema  NERVOUS SYSTEM:  A&Ox3, no focal deficits   SKIN: No rashes or lesions    LABS:                        11.3   5.80  )-----------( 154      ( 2023 07:47 )             35.9     -    137  |  105  |  51<H>  ----------------------------<  90  4.0   |  19<L>  |  1.62<H>    Ca    9.0      2023 07:47  Phos  3.9       Mg     2.4         TPro  6.7  /  Alb  3.5  /  TBili  1.1  /  DBili  x   /  AST  45<H>  /  ALT  36  /  AlkPhos  147<H>      PT/INR - ( 2023 17:32 )   PT: 15.6 sec;   INR: 1.34 ratio    PTT - ( 2023 17:32 )  PTT:29.5 sec    Urinalysis Basic - ( 2023 21:11 )    Color: Yellow / Appearance: Clear / S.021 / pH: x  Gluc: x / Ketone: Negative  / Bili: Negative / Urobili: Negative   Blood: x / Protein: 30 mg/dL / Nitrite: Negative   Leuk Esterase: Negative / RBC: 1 /hpf / WBC 3 /HPF   Sq Epi: x / Non Sq Epi: 3 /hpf / Bacteria: Negative    Culture - Urine (collected 2023 21:11)  Source: Clean Catch Clean Catch (Midstream)  Final Report (2023 22:19):    <10,000 CFU/mL Normal Urogenital Jodee    RADIOLOGY & ADDITIONAL TESTS:  Results Reviewed:   Imaging Personally Reviewed:  Electrocardiogram Personally Reviewed:  Tele:    COORDINATION OF CARE:  Care Discussed with Consultants/Other Providers [Y/N]:  Prior or Outpatient Records Reviewed [Y/N]:   *******************************  Pamella Brambila MD (PGY-1)  Internal Medicine  Contact via [employer] TEAMS  [facility] Pager: [phone]  [facility] Pager: [phone]  *******************************    PROGRESS NOTE:     Patient is a 77y old  Male who presents with a chief complaint of shortness of breath (2023 15:21)    INTERVAL EVENTS: No acute overnight events.     SUBJECTIVE: Patient seen and examined at bedside. This morning, the patient is comfortable and doing well. No acute complaints. States he feels tired but his SOB is improving. Denies fevers, chills, N/V/D, chest pain, SOB, abdominal pain.    MEDICATIONS  (STANDING):  aspirin  chewable 81 milliGRAM(s) Oral daily  furosemide   Injectable 40 milliGRAM(s) IV Push <User Schedule>  latanoprost 0.005% Ophthalmic Solution 1 Drop(s) Both EYES at bedtime  metoprolol succinate ER 25 milliGRAM(s) Oral two times a day  mirtazapine 30 milliGRAM(s) Oral daily  sacubitril 24 mG/valsartan 26 mG 1 Tablet(s) Oral two times a day  spironolactone 12.5 milliGRAM(s) Oral daily  tamsulosin 0.4 milliGRAM(s) Oral at bedtime  venlafaxine XR. 150 milliGRAM(s) Oral daily    MEDICATIONS  (PRN):    CAPILLARY BLOOD GLUCOSE    I&O's Summary    PHYSICAL EXAM:  Vital Signs Last 24 Hrs  T(C): 36.3 (2023 05:35), Max: 36.6 (2023 18:25)  T(F): 97.3 (2023 05:35), Max: 97.9 (2023 18:25)  HR: 75 (2023 05:35) (75 - 86)  BP: 90/55 (2023 05:35) (90/55 - 114/76)  BP(mean): --  RR: 18 (2023 05:35) (17 - 18)  SpO2: 96% (2023 05:35) (96% - 100%)    Parameters below as of 2023 05:35  Patient On (Oxygen Delivery Method): room air    GENERAL: NAD, lying in bed comfortably  NECK: +JVP up to mandible  HEART: S1, S2, Regular rate and rhythm, no murmurs, rubs, or gallops  LUNGS: decreased breath sounds at base of right lung. Unlabored respirations, no crackles, wheezing, or rhonchi  ABDOMEN: Soft, nontender, nondistended, +BS  EXTREMITIES: 2+ peripheral pulses bilaterally. 1-2+ LE edema  NERVOUS SYSTEM:  A&Ox3, no focal deficits   SKIN: No rashes or lesions    LABS:                        11.7   5.78  )-----------( 162      ( 2023 07:27 )             36.7     01-05    138  |  104  |  45<H>  ----------------------------<  94  3.7   |  20<L>  |  1.60<H>    Ca    8.8      2023 07:25  Phos  3.6     01-05  Mg     2.4     01-03    TPro  6.7  /  Alb  3.5  /  TBili  1.1  /  DBili  x   /  AST  45<H>  /  ALT  36  /  AlkPhos  147<H>  01-04    PT/INR - ( 2023 17:32 )   PT: 15.6 sec;   INR: 1.34 ratio    PTT - ( 2023 17:32 )  PTT:29.5 sec    Urinalysis Basic - ( 2023 21:11 )    Color: Yellow / Appearance: Clear / S.021 / pH: x  Gluc: x / Ketone: Negative  / Bili: Negative / Urobili: Negative   Blood: x / Protein: 30 mg/dL / Nitrite: Negative   Leuk Esterase: Negative / RBC: 1 /hpf / WBC 3 /HPF   Sq Epi: x / Non Sq Epi: 3 /hpf / Bacteria: Negative    Culture - Urine (collected 2023 21:11)  Source: Clean Catch Clean Catch (Midstream)  Final Report (2023 22:19):    <10,000 CFU/mL Normal Urogenital Jodee    RADIOLOGY & ADDITIONAL TESTS:  Results Reviewed:   Imaging Personally Reviewed:  Electrocardiogram Personally Reviewed:  Tele:    COORDINATION OF CARE:  Care Discussed with Consultants/Other Providers [Y/N]:  Prior or Outpatient Records Reviewed [Y/N]:

## 2023-01-05 NOTE — PROGRESS NOTE ADULT - PROBLEM SELECTOR PLAN 4
upon admission noted to have BRODY with creatinine 1.77, likely related to congestion  Diuretics as per above and obtain chemistry at least daily to assess Cr and electrolytes

## 2023-01-05 NOTE — CHART NOTE - NSCHARTNOTEFT_GEN_A_CORE
Patient Name: John Connor Date: 1945  Address: [address] [place], [place] [address]Noc: Male  Rx Written	Rx Dispensed	Drug	Quantity	Days Supply	Prescriber Name	Prescriber Mely #	Payment Method	Dispenser  10/01/2022	10/01/2022	oxycodone hcl (ir) 5 mg tablet	20	5	Rachael Pedraza	[id number]	Medicare	[facility]  05/17/2022	05/17/2022	clonazepam 0.25 mg odt	21	7	[facility]	[id number]	[facility]  05/17/2022	05/17/2022	clonazepam 0.5 mg tablet	60	30	Danya Rebolledo MD	[id number]	[facility]    Patient Name: John Connor Date: 1945  Address: [address] [place], [place] [address]Rcx: Male  Rx Written	Rx Dispensed	Drug	Quantity	Days Supply	Prescriber Name	Prescriber Mely #	Payment Method	Dispenser  10/04/2022	10/07/2022	zolpidem tartrate 5 mg tablet	30	30	Justo Yu MD	[id number]	Medicare	[id number]    Cesia Lim, PGY-3

## 2023-01-05 NOTE — PROGRESS NOTE ADULT - PROBLEM SELECTOR PLAN 1
continue Toprol XL 25 mg PO BID, Aldactone 12.5mg QD, Entresto 24/26mg BID  Continue Lasix 40mg IVP BID for one more day, will reassess diuretic need tomorrow  obtain daily standing weights, strict I&Os.

## 2023-01-05 NOTE — PROGRESS NOTE ADULT - PROBLEM SELECTOR PLAN 5
HARRY concerning for malignancy vs infectious pathology; possibly biopsy by interventional pulm for further characterization  Appreciate pulmonary recommendations and management input

## 2023-01-05 NOTE — PROGRESS NOTE ADULT - SUBJECTIVE AND OBJECTIVE BOX
PULMONARY SERVICE FOLLOW UP CONSULT NOTE    SUBJECTIVE:  Denies chest pain, dyspnea, cough, or wheezing.  No acute complaints.     REVIEW OF SYSTEMS:  All additional ROS negative.    MEDICATIONS:  Pulmonary:    Antimicrobials:    Anticoagulants:  aspirin  chewable 81 milliGRAM(s) Oral daily    Onc:    GI/:  tamsulosin 0.4 milliGRAM(s) Oral at bedtime    Endocrine:    Cardiac:  furosemide   Injectable 40 milliGRAM(s) IV Push <User Schedule>  metoprolol succinate ER 25 milliGRAM(s) Oral two times a day  sacubitril 24 mG/valsartan 26 mG 1 Tablet(s) Oral two times a day  spironolactone 12.5 milliGRAM(s) Oral daily    Other Medications:  latanoprost 0.005% Ophthalmic Solution 1 Drop(s) Both EYES at bedtime  mirtazapine 30 milliGRAM(s) Oral daily  venlafaxine XR. 150 milliGRAM(s) Oral daily      PHYSICAL EXAM  Vital Signs Last 24 Hrs  T(C): 36.3 (2023 09:50), Max: 36.6 (2023 18:25)  T(F): 97.3 (2023 09:50), Max: 97.9 (2023 18:25)  HR: 78 (2023 09:50) (75 - 86)  BP: 103/66 (2023 09:50) (90/55 - 114/76)  BP(mean): --  RR: 18 (2023 09:50) (17 - 18)  SpO2: 97% (2023 09:50) (96% - 100%)    Parameters below as of 2023 09:50  Patient On (Oxygen Delivery Method): room air              CONSTITUTIONAL: No acute distress.   HEENT: Conjunctiva clear B/L.  Moist oral mucosa.   Cardiovascular: RRR with no murmurs. No JVD noted. No lower extremity edema B/L. Extremities are warm and well perfused.    Respiratory: decreased breath sounds in RLL  Gastrointestinal: Soft, nontender. Non-distended. Non-rigid.    Neurologic: Alert and awake. Moving all extremities. Following commands.    Skin:  No gross rashes notes.    LABS:      CBC Full  -  ( 2023 07:27 )  WBC Count : 5.78 K/uL  RBC Count : 3.99 M/uL  Hemoglobin : 11.7 g/dL  Hematocrit : 36.7 %  Platelet Count - Automated : 162 K/uL  Mean Cell Volume : 92.0 fl  Mean Cell Hemoglobin : 29.3 pg  Mean Cell Hemoglobin Concentration : 31.9 gm/dL  Auto Neutrophil # : x  Auto Lymphocyte # : x  Auto Monocyte # : x  Auto Eosinophil # : x  Auto Basophil # : x  Auto Neutrophil % : x  Auto Lymphocyte % : x  Auto Monocyte % : x  Auto Eosinophil % : x  Auto Basophil % : x        138  |  104  |  45<H>  ----------------------------<  94  3.7   |  20<L>  |  1.60<H>    Ca    8.8      2023 07:25  Phos  3.6       Mg     2.4         TPro  6.7  /  Alb  3.5  /  TBili  1.1  /  DBili  x   /  AST  45<H>  /  ALT  36  /  AlkPhos  147<H>      PT/INR - ( 2023 17:32 )   PT: 15.6 sec;   INR: 1.34 ratio         PTT - ( 2023 17:32 )  PTT:29.5 sec      Urinalysis Basic - ( 2023 21:11 )    Color: Yellow / Appearance: Clear / S.021 / pH: x  Gluc: x / Ketone: Negative  / Bili: Negative / Urobili: Negative   Blood: x / Protein: 30 mg/dL / Nitrite: Negative   Leuk Esterase: Negative / RBC: 1 /hpf / WBC 3 /HPF   Sq Epi: x / Non Sq Epi: 3 /hpf / Bacteria: Negative        RADIOLOGY & ADDITIONAL STUDIES:

## 2023-01-05 NOTE — PROGRESS NOTE ADULT - ASSESSMENT
77 year old male with PMH significant for CAD with a silent MI this past May, CHF, lung nodule s/p partial right lobectomy c/b cardiogenic shock, and depression who presented due to worsening shortness of breath with exertion most likely related to acute decompensated HF 2/2 to entero/rhinovirus. Pulmonology consulted for right-sided pleural effusion and enlarging HARRY lung nodule.     #HARRY lung nodule  #Right pleural effusion  #Entero/rhinovirus    Recommendations:  - suspect dyspnea on exertion is secondary to CHF exacerbation with viral illness  - heart failure following, agree with aggressive diuresis given volume overload on exam  - bedside ultrasound with simple right-sided pleural effusion, no left-sided effusion, will continue to monitor size of effusion with diuresis  - rapid growth of HARRY lung nodule is concerning for malignancy vs less likely infectious   - will speak with interventional pulm to discuss biopsy of HARRY lung nodule, plan to sample pleural fluid first because positive cytology would indicate mets (if negative would likely pursue biopsy of HARRY lung nodule)  - continue holding plavix for 5 days in preparation for possible intervention on Monday 1/9  - supportive care for viral illness    Bharath Healy MD  Internal Medicine, PGY-2    *Note not finalized until attending signature* 77 year old male with PMH significant for CAD with a silent MI this past May, CHF, lung nodule s/p partial right lobectomy c/b cardiogenic shock, and depression who presented due to worsening shortness of breath with exertion most likely related to acute decompensated HF 2/2 to entero/rhinovirus. Pulmonology consulted for right-sided pleural effusion and enlarging HARRY lung nodule.     #HARRY lung nodule  #Right pleural effusion  #Entero/rhinovirus    Recommendations:  - suspect dyspnea on exertion is secondary to CHF exacerbation with viral illness  - heart failure following, agree with aggressive diuresis given volume overload on exam  - bedside ultrasound with simple right-sided pleural effusion, no left-sided effusion, will continue to monitor size of effusion with diuresis  - rapid growth of HARRY lung nodule is concerning for malignancy vs less likely infectious   - will speak with interventional pulm to discuss biopsy of HARRY lung nodule, plan to sample pleural fluid first because positive cytology would indicate mets (if negative would likely pursue biopsy of HARRY lung nodule)  - continue holding plavix for 5 days in preparation for possible intervention on Monday or Tuesday  - supportive care for viral illness    Bharath Healy MD  Internal Medicine, PGY-2    *Note not finalized until attending signature*

## 2023-01-05 NOTE — PROGRESS NOTE ADULT - PROBLEM SELECTOR PLAN 9
Diet: DASH/TLC  DVT PPx: SCDs; holding chemical VTE ppx for possible procedure w/ pulm   Dispo: pending clinical improvement  Code Status: full code

## 2023-01-05 NOTE — PROGRESS NOTE ADULT - PROBLEM SELECTOR PLAN 6
TSH elevated to 7 on admission  - repeat TSH 4.7 w/ normal T4; likely subclinical hypothyroidism TSH elevated to 7 on admission  - repeat TSH 4.7 w/ normal total T4; likely subclinical hypothyroidism

## 2023-01-05 NOTE — PROGRESS NOTE ADULT - PROBLEM SELECTOR PLAN 4
Hx of NSTEMI in 5/2022 s/p cardiac cath showing RCA  and significant LAD and diagonal lesions w/ LILLIANA 3 flow. EKG nonischemic and trops in ED unremarkable (52 --> 45)  - TTE (11/2022): EF 22% w/ severe LV systolic dysfunction, decreased RV systolic function, and severe pHTN  - TTE (1/4/2023): EF 27% w/ eccentric LVH, severe LVSD, akinesis of apical, septal, inferior walls and hypokinesis of remaining walls, severe LAE w/ dec RVSF  - c/w ASA 81mg qd  - holding Plavix 75mg qd x 5d for possible procedure (safe to do so from cardiology perspective)  - c/w rosuvastatin 20mg qhs (patient cannot tolerate atorvastatin)

## 2023-01-05 NOTE — PROGRESS NOTE ADULT - PROBLEM SELECTOR PLAN 3
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  However, MR severity has worsened progressively since then.  Echo from this admission now shows severe MR.   may need further evaluation with LEN and structural cardiology evaluation once patient is more euvolemic as MR may be functional

## 2023-01-05 NOTE — PROGRESS NOTE ADULT - PROBLEM SELECTOR PLAN 2
CTA (1/3) showed moderate loculated R pleural effusion, slightly increased from prior. Given hx of lung adenocarcinoma and larger HARRY pulmonary nodule, suspect malignant effusion vs. less likely infectious given patient is afebrile without a WBC count, and non-toxic appearing   - pulm consulted for diagnostic +/- therapeutic thoracentesis and biopsy of pulmonary nodule; appreciate recs  - c/w IV Lasix 40mg BID for continued diuresis, will reassess volume status and switch to daily dosing  - holding Plavix 75mg qd x 5d for possible procedure (safe to do so from cardiology perspective)  - monitor off of abx for now as pt. is afebrile and without leukocytosis CTA (1/3) showed moderate loculated R pleural effusion, slightly increased from prior. Given hx of lung adenocarcinoma and larger HARRY pulmonary nodule, suspect malignant effusion vs. less likely infectious given patient is afebrile without a WBC count, and non-toxic appearing   - pulm consulted for diagnostic +/- therapeutic thoracentesis and biopsy of pulmonary nodule; appreciate recs  - decreased IV Lasix to 40mg qd for continued diuresis  - holding Plavix 75mg qd x 5d for possible procedure (safe to do so from cardiology perspective)  - monitor off of abx for now as pt. is afebrile and without leukocytosis

## 2023-01-05 NOTE — PROGRESS NOTE ADULT - CONVERSATION DETAILS
Patient was given the opportunity to discuss advance care planning. Explanation and discussion of advanced directives were performed with the patient and family member for 15 minutes of face-to-face time. He did not have any decisions at this time. Wife would like patient to remain full code with trial of intubation if ever needed. Pt remains full code at this time. He did express that he would never want a feeding tube.

## 2023-01-05 NOTE — PATIENT PROFILE ADULT - FALL HARM RISK - UNIVERSAL INTERVENTIONS
Bed in lowest position, wheels locked, appropriate side rails in place/Call bell, personal items and telephone in reach/Instruct patient to call for assistance before getting out of bed or chair/Non-slip footwear when patient is out of bed/[place] to call system/Physically safe environment - no spills, clutter or unnecessary equipment/Purposeful Proactive Rounding/Room/bathroom lighting operational, light cord in reach

## 2023-01-06 ENCOUNTER — TRANSCRIPTION ENCOUNTER (OUTPATIENT)
Age: 78
End: 2023-01-06

## 2023-01-06 LAB
ANION GAP SERPL CALC-SCNC: 12 MMOL/L — SIGNIFICANT CHANGE UP (ref 5–17)
BUN SERPL-MCNC: 43 MG/DL — HIGH (ref 7–23)
CALCIUM SERPL-MCNC: 8.7 MG/DL — SIGNIFICANT CHANGE UP (ref 8.4–10.5)
CHLORIDE SERPL-SCNC: 103 MMOL/L — SIGNIFICANT CHANGE UP (ref 96–108)
CO2 SERPL-SCNC: 21 MMOL/L — LOW (ref 22–31)
CREAT SERPL-MCNC: 1.56 MG/DL — HIGH (ref 0.5–1.3)
EGFR: 45 ML/MIN/1.73M2 — LOW
GLUCOSE SERPL-MCNC: 96 MG/DL — SIGNIFICANT CHANGE UP (ref 70–99)
HCT VFR BLD CALC: 34.3 % — LOW (ref 39–50)
HGB BLD-MCNC: 11.1 G/DL — LOW (ref 13–17)
MAGNESIUM SERPL-MCNC: 2 MG/DL — SIGNIFICANT CHANGE UP (ref 1.6–2.6)
MCHC RBC-ENTMCNC: 29.1 PG — SIGNIFICANT CHANGE UP (ref 27–34)
MCHC RBC-ENTMCNC: 32.4 GM/DL — SIGNIFICANT CHANGE UP (ref 32–36)
MCV RBC AUTO: 89.8 FL — SIGNIFICANT CHANGE UP (ref 80–100)
NRBC # BLD: 0 /100 WBCS — SIGNIFICANT CHANGE UP (ref 0–0)
PHOSPHATE SERPL-MCNC: 3.6 MG/DL — SIGNIFICANT CHANGE UP (ref 2.5–4.5)
PLATELET # BLD AUTO: 168 K/UL — SIGNIFICANT CHANGE UP (ref 150–400)
POTASSIUM SERPL-MCNC: 3.7 MMOL/L — SIGNIFICANT CHANGE UP (ref 3.5–5.3)
POTASSIUM SERPL-SCNC: 3.7 MMOL/L — SIGNIFICANT CHANGE UP (ref 3.5–5.3)
RBC # BLD: 3.82 M/UL — LOW (ref 4.2–5.8)
RBC # FLD: 17.4 % — HIGH (ref 10.3–14.5)
SODIUM SERPL-SCNC: 136 MMOL/L — SIGNIFICANT CHANGE UP (ref 135–145)
WBC # BLD: 5.77 K/UL — SIGNIFICANT CHANGE UP (ref 3.8–10.5)
WBC # FLD AUTO: 5.77 K/UL — SIGNIFICANT CHANGE UP (ref 3.8–10.5)

## 2023-01-06 PROCEDURE — 99232 SBSQ HOSP IP/OBS MODERATE 35: CPT | Mod: GC

## 2023-01-06 RX ADMIN — Medication 81 MILLIGRAM(S): at 12:46

## 2023-01-06 RX ADMIN — LATANOPROST 1 DROP(S): 0.05 SOLUTION/ DROPS OPHTHALMIC; TOPICAL at 22:00

## 2023-01-06 RX ADMIN — Medication 40 MILLIGRAM(S): at 06:06

## 2023-01-06 RX ADMIN — Medication 150 MILLIGRAM(S): at 12:46

## 2023-01-06 RX ADMIN — SACUBITRIL AND VALSARTAN 1 TABLET(S): 24; 26 TABLET, FILM COATED ORAL at 17:37

## 2023-01-06 RX ADMIN — Medication 25 MILLIGRAM(S): at 17:37

## 2023-01-06 RX ADMIN — SPIRONOLACTONE 12.5 MILLIGRAM(S): 25 TABLET, FILM COATED ORAL at 06:06

## 2023-01-06 RX ADMIN — SACUBITRIL AND VALSARTAN 1 TABLET(S): 24; 26 TABLET, FILM COATED ORAL at 06:06

## 2023-01-06 RX ADMIN — MIRTAZAPINE 30 MILLIGRAM(S): 45 TABLET, ORALLY DISINTEGRATING ORAL at 22:00

## 2023-01-06 RX ADMIN — ZOLPIDEM TARTRATE 5 MILLIGRAM(S): 10 TABLET ORAL at 23:31

## 2023-01-06 RX ADMIN — TAMSULOSIN HYDROCHLORIDE 0.4 MILLIGRAM(S): 0.4 CAPSULE ORAL at 22:00

## 2023-01-06 RX ADMIN — Medication 25 MILLIGRAM(S): at 06:06

## 2023-01-06 NOTE — DISCHARGE NOTE PROVIDER - NSDCFUSCHEDAPPT_GEN_ALL_CORE_FT
Stevie Padilla  [facility] Physician [facility]  [facility] ELLENSURG [id number]  Scheduled Appointment: 01/09/2023    Mayelin Burden  [facility] Physician [facility]  [address] Felton  Scheduled Appointment: 03/28/2023     Niles Walden  [facility]  CARDIOLOGY [address]  Scheduled Appointment: 01/24/2023    Mayelin Burden  [facility]  Suraj Felton  Scheduled Appointment: 03/28/2023

## 2023-01-06 NOTE — PROGRESS NOTE ADULT - SUBJECTIVE AND OBJECTIVE BOX
PULMONARY SERVICE FOLLOW UP CONSULT NOTE    SUBJECTIVE:  Denies chest pain, dyspnea, cough, or wheezing.  No acute complaints.     REVIEW OF SYSTEMS:  All additional ROS negative.    MEDICATIONS:  Pulmonary:    Antimicrobials:    Anticoagulants:  aspirin  chewable 81 milliGRAM(s) Oral daily    Onc:    GI/:  tamsulosin 0.4 milliGRAM(s) Oral at bedtime    Endocrine:    Cardiac:  furosemide   Injectable 40 milliGRAM(s) IV Push <User Schedule>  metoprolol succinate ER 25 milliGRAM(s) Oral two times a day  sacubitril 24 mG/valsartan 26 mG 1 Tablet(s) Oral two times a day  spironolactone 12.5 milliGRAM(s) Oral daily    Other Medications:  latanoprost 0.005% Ophthalmic Solution 1 Drop(s) Both EYES at bedtime  mirtazapine 30 milliGRAM(s) Oral daily  venlafaxine XR. 150 milliGRAM(s) Oral daily  zolpidem 5 milliGRAM(s) Oral at bedtime PRN      PHYSICAL EXAM  Vital Signs Last 24 Hrs  T(C): 36.3 (06 Jan 2023 06:06), Max: 36.6 (05 Jan 2023 17:08)  T(F): 97.4 (06 Jan 2023 06:06), Max: 97.9 (05 Jan 2023 22:03)  HR: 67 (06 Jan 2023 06:06) (63 - 78)  BP: 92/53 (06 Jan 2023 06:06) (85/56 - 103/66)  BP(mean): --  RR: 18 (06 Jan 2023 06:06) (17 - 18)  SpO2: 97% (06 Jan 2023 06:06) (96% - 97%)    Parameters below as of 06 Jan 2023 06:06  Patient On (Oxygen Delivery Method): room air        01-05 @ 07:01  -  01-06 @ 07:00  --------------------------------------------------------  IN: 1140 mL / OUT: 2800 mL / NET: -1660 mL            CONSTITUTIONAL: No acute distress.   HEENT:  Conjunctiva clear B/L.  Moist oral mucosa.   Cardiovascular: RRR with no murmurs. No JVD noted. No lower extremity edema B/L. Extremities are warm and well perfused.    Respiratory: Lungs CTAB. No wrr. No accessory muscle use.   Gastrointestinal:  Soft, nontender. Non-distended. Non-rigid.    Neurologic:  Alert and awake. Moving all extremities. Following commands.    Skin:  No gross rashes notes.    LABS:      CBC Full  -  ( 06 Jan 2023 07:19 )  WBC Count : 5.77 K/uL  RBC Count : 3.82 M/uL  Hemoglobin : 11.1 g/dL  Hematocrit : 34.3 %  Platelet Count - Automated : 168 K/uL  Mean Cell Volume : 89.8 fl  Mean Cell Hemoglobin : 29.1 pg  Mean Cell Hemoglobin Concentration : 32.4 gm/dL  Auto Neutrophil # : x  Auto Lymphocyte # : x  Auto Monocyte # : x  Auto Eosinophil # : x  Auto Basophil # : x  Auto Neutrophil % : x  Auto Lymphocyte % : x  Auto Monocyte % : x  Auto Eosinophil % : x  Auto Basophil % : x    01-06    136  |  103  |  43<H>  ----------------------------<  96  3.7   |  21<L>  |  1.56<H>    Ca    8.7      06 Jan 2023 07:19  Phos  3.6     01-06  Mg     2.0     01-06                        RADIOLOGY & ADDITIONAL STUDIES: PULMONARY SERVICE FOLLOW UP CONSULT NOTE    SUBJECTIVE:  Denies chest pain, dyspnea, cough, or wheezing. Reports constipation, takes colace 1x per day at home.      REVIEW OF SYSTEMS:  All additional ROS negative.    MEDICATIONS:  Pulmonary:    Antimicrobials:    Anticoagulants:  aspirin  chewable 81 milliGRAM(s) Oral daily    Onc:    GI/:  tamsulosin 0.4 milliGRAM(s) Oral at bedtime    Endocrine:    Cardiac:  furosemide   Injectable 40 milliGRAM(s) IV Push <User Schedule>  metoprolol succinate ER 25 milliGRAM(s) Oral two times a day  sacubitril 24 mG/valsartan 26 mG 1 Tablet(s) Oral two times a day  spironolactone 12.5 milliGRAM(s) Oral daily    Other Medications:  latanoprost 0.005% Ophthalmic Solution 1 Drop(s) Both EYES at bedtime  mirtazapine 30 milliGRAM(s) Oral daily  venlafaxine XR. 150 milliGRAM(s) Oral daily  zolpidem 5 milliGRAM(s) Oral at bedtime PRN      PHYSICAL EXAM  Vital Signs Last 24 Hrs  T(C): 36.3 (06 Jan 2023 06:06), Max: 36.6 (05 Jan 2023 17:08)  T(F): 97.4 (06 Jan 2023 06:06), Max: 97.9 (05 Jan 2023 22:03)  HR: 67 (06 Jan 2023 06:06) (63 - 78)  BP: 92/53 (06 Jan 2023 06:06) (85/56 - 103/66)  BP(mean): --  RR: 18 (06 Jan 2023 06:06) (17 - 18)  SpO2: 97% (06 Jan 2023 06:06) (96% - 97%)    Parameters below as of 06 Jan 2023 06:06  Patient On (Oxygen Delivery Method): room air        01-05 @ 07:01  -  01-06 @ 07:00  --------------------------------------------------------  IN: 1140 mL / OUT: 2800 mL / NET: -1660 mL            CONSTITUTIONAL: No acute distress.   HEENT:  Conjunctiva clear B/L.  Moist oral mucosa.   Cardiovascular: RRR with no murmurs. No JVD noted. No lower extremity edema B/L. Extremities are warm and well perfused.    Respiratory: Lungs CTAB. No wrr. No accessory muscle use.   Gastrointestinal:  Soft, nontender. Non-distended. Non-rigid.    Neurologic:  Alert and awake. Moving all extremities. Following commands.    Skin:  No gross rashes notes.    LABS:      CBC Full  -  ( 06 Jan 2023 07:19 )  WBC Count : 5.77 K/uL  RBC Count : 3.82 M/uL  Hemoglobin : 11.1 g/dL  Hematocrit : 34.3 %  Platelet Count - Automated : 168 K/uL  Mean Cell Volume : 89.8 fl  Mean Cell Hemoglobin : 29.1 pg  Mean Cell Hemoglobin Concentration : 32.4 gm/dL  Auto Neutrophil # : x  Auto Lymphocyte # : x  Auto Monocyte # : x  Auto Eosinophil # : x  Auto Basophil # : x  Auto Neutrophil % : x  Auto Lymphocyte % : x  Auto Monocyte % : x  Auto Eosinophil % : x  Auto Basophil % : x    01-06    136  |  103  |  43<H>  ----------------------------<  96  3.7   |  21<L>  |  1.56<H>    Ca    8.7      06 Jan 2023 07:19  Phos  3.6     01-06  Mg     2.0     01-06                        RADIOLOGY & ADDITIONAL STUDIES:

## 2023-01-06 NOTE — PROGRESS NOTE ADULT - PROBLEM SELECTOR PLAN 3
SCr on admission 1.77, baseline ~0.8; likely i/s/o cardiorenal syndrome  - urine urea 350, UCr 28, FEUrea 40.4%  - c/w IV Lasix 40mg qd for continued diuresis  - avoid nephrotoxic meds and renally dose medications   - monitor creatinine daily

## 2023-01-06 NOTE — PROGRESS NOTE ADULT - ASSESSMENT
77 year old male with PMH significant for CAD with a silent MI this past May, CHF, lung nodule s/p partial right lobectomy c/b cardiogenic shock, and depression who presented due to worsening shortness of breath with exertion most likely related to acute decompensated HF 2/2 to entero/rhinovirus. Pulmonology consulted for right-sided pleural effusion and enlarging HARRY lung nodule.     #HARRY lung nodule  #Right pleural effusion  #Entero/rhinovirus    Recommendations:  - suspect dyspnea on exertion is secondary to CHF exacerbation with viral illness  - heart failure following, agree with aggressive diuresis given volume overload on exam  - repeat bedside ultrasound today still with right sided pleural effusion despite good response to diuretics  - rapid growth of HARRY lung nodule is concerning for malignancy vs less likely infectious   - will speak with interventional pulm to discuss biopsy of HARRY lung nodule, plan to sample pleural fluid first because positive cytology would indicate mets (if negative would likely pursue biopsy of HARRY lung nodule)  - continue holding plavix for 5 days in preparation for possible intervention on Monday or Tuesday  - supportive care for viral illness    Bharath Healy MD  Internal Medicine, PGY-2    *Note not finalized until attending signature* 77 year old male with PMH significant for CAD with a silent MI this past May, CHF, lung nodule s/p partial right lobectomy c/b cardiogenic shock, and depression who presented due to worsening shortness of breath with exertion most likely related to acute decompensated HF 2/2 to entero/rhinovirus. Pulmonology consulted for right-sided pleural effusion and enlarging HARRY lung nodule.     #HARRY lung nodule  #Right pleural effusion  #Entero/rhinovirus    Recommendations:  - suspect dyspnea on exertion is secondary to CHF exacerbation with viral illness  - heart failure following, agree with aggressive diuresis given volume overload on exam  - rapid growth of HARRY lung nodule is concerning for malignancy vs less likely infectious   - repeat bedside ultrasound today still with right sided pleural effusion despite good response to diuretics  - plan to sample pleural fluid first because positive cytology would indicate mets (if negative would likely pursue biopsy of HARRY lung nodule)  - continue holding plavix for 5 days in preparation for possible intervention, likely Tuesday  - supportive care for viral illness    Bharath Healy MD  Internal Medicine, PGY-2    *Note not finalized until attending signature*

## 2023-01-06 NOTE — DISCHARGE NOTE PROVIDER - NSDCMRMEDTOKEN_GEN_ALL_CORE_FT
Aldactone: 12.5 milligram(s) orally once a day  aspirin 81 mg oral capsule: 1 cap(s) orally once a day   Chest Xray PA &amp; Lateral : Please send results to Dr Padilla [phone]  ICD code: J 93.9  Effexor  mg oral capsule, extended release: 1 cap(s) orally once a day   Farxiga 5 mg oral tablet: 1 tab(s) orally once a day  Flomax 0.4 mg oral capsule: 1 cap(s) orally once a day  Lasix 40 mg oral tablet: 1 tab(s) orally 2 times a day   latanoprost 0.005% ophthalmic solution: 1 drop(s) to each affected eye once a day (in the evening)  losartan 50 mg oral tablet: 1 tab(s) orally once a day  Metoprolol Succinate ER 25 mg oral tablet, extended release: 1 tab(s) orally every 12 hours  mirtazapine 15 mg oral tablet: 2 tab(s) orally  Plavix 75 mg oral tablet: 1 tab(s) orally once a day   rosuvastatin 20 mg oral tablet: 1 tab(s) orally once a day (at bedtime)   latanoprost 0.005% ophthalmic solution: 1 drop(s) to each affected eye once a day (in the evening)   Aldactone 25 mg oral tablet: 0.5 tab(s) orally 2 times a day   aspirin 81 mg oral capsule: 1 cap(s) orally once a day   Effexor  mg oral capsule, extended release: 1 cap(s) orally once a day   Entresto 24 mg-26 mg oral tablet: 1 tab(s) orally 2 times a day  Farxiga 5 mg oral tablet: 1 tab(s) orally once a day  Flomax 0.4 mg oral capsule: 1 cap(s) orally once a day  Lasix 40 mg oral tablet: 1 tab(s) orally 2 times a day   latanoprost 0.005% ophthalmic solution: 1 drop(s) to each affected eye once a day (in the evening)  Metoprolol Succinate ER 25 mg oral tablet, extended release: 1 tab(s) orally every 12 hours  mirtazapine 15 mg oral tablet: 2 tab(s) orally once a day (at bedtime)   Plavix 75 mg oral tablet: 1 tab(s) orally once a day   rosuvastatin 20 mg oral tablet: 1 tab(s) orally once a day (at bedtime)

## 2023-01-06 NOTE — PROGRESS NOTE ADULT - ASSESSMENT
77M with CAD s/p NSTEMI in May 2022, HFrEF (EF 22% in 11/2022), lung nodule s/p partial right lobectomy c/b cardiogenic shock, and depression who presented due to worsening shortness of breath with exertion x 2 weeks, orthopnea, paroxysmal nocturnal dyspnea, found to have elevated pro-BNP, +JVP, +entero/rhinovirus w/ imaging showing R pleural effusion. Presentation likely 2/2 acute on chronic CHF exacerbation i/s/o entero/rhinovirus, currently being diuresed and pending thoracentesis w/ pulmonology.    77M with CAD s/p NSTEMI in May 2022, HFrEF (EF 22% in 11/2022), lung nodule s/p partial right lobectomy c/b cardiogenic shock, and depression who presented due to worsening shortness of breath with exertion x 2 weeks, orthopnea, paroxysmal nocturnal dyspnea, found to have elevated pro-BNP, +JVP, +entero/rhinovirus w/ imaging showing R pleural effusion. Presentation likely 2/2 acute on chronic CHF exacerbation i/s/o entero/rhinovirus, currently being diuresed and pending possible thoracentesis w/ pulmonology.

## 2023-01-06 NOTE — DISCHARGE NOTE PROVIDER - NSDCCPCAREPLAN_GEN_ALL_CORE_FT
PRINCIPAL DISCHARGE DIAGNOSIS  Diagnosis: Acute decompensated heart failure  Assessment and Plan of Treatment: You came to the hospital with shortness of breath and you were found to have signs of fluid backup from your heart on physical exam. This is likely caused by your preexisting heart faliure and worsened by your viral infection we detected when you were admitted into the hospital. You were given lasix decrease your fluid overload and have reported that your shortness of breath has improved and that it is not worsened when you walk around the hospital. You were also given entresto to help improve symptoms. Please continue these medications and follow up with cardiologist one week after discharge. If you notice symptoms including but not limited to shortness of breath,  you notice increase lower extremity swelling, or chest pain return to the ED      SECONDARY DISCHARGE DIAGNOSES  Diagnosis: Pleural effusion  Assessment and Plan of Treatment: On imaging when accessing for your difficulty breathing we saw some fluid around your right lung. We also saw that a nodule in your left lung that has grown compared to imaging you had done previously. These signs are concerning for cancer and so we scheduled a procedure to drain the fluid aroubd the right lung and see if it was caused by cancer. If the fluid does not show signs for cancer then an outpatietient biopsy of the nodule will be needed.     PRINCIPAL DISCHARGE DIAGNOSIS  Diagnosis: Acute on chronic HFrEF (heart failure with reduced ejection fraction)  Assessment and Plan of Treatment: You came to the hospital with shortness of breath and you were found to have signs of fluid backup from your heart on physical exam. This is likely caused by your preexisting heart faliure and worsened by the viral infection we detected when you were admitted into the hospital. An echo was done which showed a low ejection fraction. You were given diuretics to decrease your fluid overload which improved your symptoms markedly. You were started on several medications to help with your heart failure: metoprolol 25mg twice daily, Farxiga 10mg daily, Entresto 24-26mg twice daily, and spirinolactone 12.5mg twice daily. You were also transitioned to your home diuretic dose of Lasix 40mg daily. PLease continue taking these medications as directed and monitor yourself for low blood pressures, dizziness, lightheadedness. Please follow up with your cardiologist and PCP on discharge.   If you notice worsening shortness of breath, leg swelling, chest pain, palpitations, dizziness, lightheadedness, difficulty laying flat, please return to the ED immediately.      SECONDARY DISCHARGE DIAGNOSES  Diagnosis: Pleural effusion  Assessment and Plan of Treatment: When you came to the hospital, we performed a CT scan of your chest which showed fluid buildup around your right lung as well as a left upper lobe pulmonary nodule. We consulted the pulmonology team to drain the fluid which was performed on 1/9 with 850cc of fluid removed. The initial fluid studies demonstrated that the effusion may be due to your heart failure but given your history of lung cancer, it may be due to recurrance. Please follow up with pulmonology and CT surgery for further management including possible biopsy of the lung nodule.   If you notice worsening shortness of breath, fevers, chills, cough, please return to the ED immediately.    Diagnosis: Lung nodule  Assessment and Plan of Treatment: When you came to the hospital, we performed a CT scan of your chest which showed fluid buildup around your right lung as well as a left upper lobe pulmonary nodule. We consulted the pulmonology team to drain the fluid which was performed on 1/9 with 850cc of fluid removed. The initial fluid studies demonstrated that the effusion may be due to your heart failure but given your history of lung cancer, it may be due to recurrance. Please follow up with pulmonology and CT surgery for further management including possible biopsy of the lung nodule.   If you notice worsening shortness of breath, fevers, chills, cough, please return to the ED immediately.

## 2023-01-06 NOTE — PROGRESS NOTE ADULT - PROBLEM SELECTOR PLAN 6
TSH elevated to 7 on admission  - repeat TSH 4.7 w/ normal total T4; likely subclinical hypothyroidism

## 2023-01-06 NOTE — DISCHARGE NOTE PROVIDER - CARE PROVIDER_API CALL
Niles Walden)  Cardiology; Internal Medicine  [address] [facility], [address]  [place] [address]  Phone: [phone]  Fax: [phone]  Scheduled Appointment: 01/24/2023    Stevie Padilla)  Surgery; Thoracic Surgery  [address] [place] [address]  [address]rd [place] [address]  Phone: [phone]  Fax: [phone]  Follow Up Time: 2 weeks    Nicholas Espinal ()  Critical Care Medicine; Internal Medicine; Pulmonary Disease  [address] [facility], [address]  [place] [address]  Phone: [phone]  Fax: [phone]  Follow Up Time: 1 week

## 2023-01-06 NOTE — PROGRESS NOTE ADULT - PROBLEM SELECTOR PLAN 5
CTA (1/3) showing 1.7cm solid nodule in apicoposterior left upper lobe, enlarged since July 2022 (4mm). Pt w/ hx of lung nodule s/p partial right lobectomy w/ path showing adenocarcinoma  - pulm consulted for possible biopsy; appreciate recs- to discuss w/ interventional pulm regarding biopsy  - Dr. Padilla's team aware per wife request  - if no inpatient workup, then consider follow-up chest CT in 3 months, PET/CT outpatient

## 2023-01-06 NOTE — DISCHARGE NOTE PROVIDER - HOSPITAL COURSE
77 year old male with PMH significant for CAD with a silent MI this past May, CHF, lung nodule s/p partial right lobectomy c/b cardiogenic shock, and depression who presented due to worsening shortness of breath with exertion. Patient states that over the last 2 weeks he was slightly short o breath when walk or going up a flight of stairs; however, it became significantly worse in the last 3-4 days. Patient states that in the last 1-2 weeks he has had to prop himself up more when sleeping and would wake up 7-8 times in the middle of the night to walk around because he could not get comfortable in bed.   Patient came into the ED with shortness of breath, physical exam showed significant JVD and labs that were notable for an elevated pro- BNP of 5605, Troponin was slightly elevated to 52, but downtrended to 45, creatinine of 1.77, and a lactate of 2.5 off the VBG. Additionally an RVP was positive for entero/rhinovirus. A CTA of the chest showed a mildly loculated right pleural effusion and an indeterminate 1.7cm apicoposterior left upper lobe nodule. Admitted overnight for CHF exacerbation and pleural effusion. Started on Lasix 40mg BID. HF consulted for further management. Pulm consulted for thoracentesis +/- biopsy of HARRY nodule found on CT. Holding Plavix for possible thoracentesis after discussing with cards, thoracentesis possibly 1/9 or 1/10. Entresto also given started 1/5 and Lasix brought down to 40 mg QD.   77M with PMH significant for CAD with a silent MI this past May, CHF, lung nodule s/p partial right lobectomy c/b cardiogenic shock, and depression who presented due to worsening shortness of breath with exertion       Patient states that over the last 2 weeks he was slightly short o breath when walk or going up a flight of stairs; however, it became significantly worse in the last 3-4 days. Patient states that in the last 1-2 weeks he has had to prop himself up more when sleeping and would wake up 7-8 times in the middle of the night to walk around because he could not get comfortable in bed.   Patient came into the ED with shortness of breath, physical exam showed significant JVD and labs that were notable for an elevated pro- BNP of 5605, Troponin was slightly elevated to 52, but downtrended to 45, creatinine of 1.77, and a lactate of 2.5 off the VBG. Additionally an RVP was positive for entero/rhinovirus. A CTA of the chest showed a mildly loculated right pleural effusion and an indeterminate 1.7cm apicoposterior left upper lobe nodule. Admitted overnight for CHF exacerbation and pleural effusion. Started on Lasix 40mg BID. HF consulted for further management. Pulm consulted for thoracentesis +/- biopsy of HARRY nodule found on CT. Holding Plavix for possible thoracentesis after discussing with cards, thoracentesis possibly 1/9 or 1/10. Entresto also given started 1/5 and Lasix brought down to 40 mg QD.        1/4: pt. admitted overnight for CHF exacerbation and pleural effusion. Started on diuretics. HF consulted for further management. Pulm consulted for thoracentesis +/- biopsy of HARRY nodule. Plan to start Entresto tmrw. Holding Plavix for possible thoracentesis (ok per cards  1/5: soft BPs this morning, gave Entresto and Lasix. per pulm, possible thora Monday. decreased Lasix to 40mg qd. Per pulm, thora likely Monday 1/9 1/6: continued soft BPs this AM, otherwise status quo  1/7: received 250cc fluid bolus for soft BPs  1/8: status quo, metamucil ordered for constipation. transitioned to PO lasix 40mg qd  1/9: thoracentesis today. psych c/s for depression  1/10: Plavix restarted. Farxiga started. likely dispo today     77M with CAD s/p NSTEMI in May 2022, HFrEF (EF 22% in 11/2022), lung nodule s/p partial right lobectomy c/b cardiogenic shock, and depression who presented due to worsening shortness of breath with exertion x 2 weeks, orthopnea, paroxysmal nocturnal dyspnea, found to have elevated pro-BNP, +JVD, +entero/rhinovirus w/ CTA chest showing mildly loculated R pleural effusion and a 1.7cm L upper lobe pulmonary nodule. Patient was seen by heart failure and was diuresed with clinical improvement. He was also seen by pulmonology for possible thoracentesis, which he underwent on 1/9 after Plavix was held for 5d. Fluid studies were consistent with a transudative process likely from heart failure exacerbation. The patient's GDMT for heart failure was optimized and he was started on Farxiga and Entresto. During this admission, the patient was also seen by psych due to worsening depression; no major changes were made.     The patient is afebrile, hemodynamically stable and medically optimized for discharge to home with follow-up with cardiology, pulmonology, CT surgery, and his PCP.   77M with CAD s/p NSTEMI in May 2022, HFrEF (EF 22% in 11/2022), lung nodule s/p partial right lobectomy c/b cardiogenic shock, and depression who presented due to worsening shortness of breath with exertion x 2 weeks, orthopnea, paroxysmal nocturnal dyspnea, found to have elevated pro-BNP, +JVD, +entero/rhinovirus w/ CTA chest showing mildly loculated R pleural effusion and a 1.7cm L upper lobe pulmonary nodule. Patient was seen by heart failure and was diuresed with clinical improvement. He was also seen by pulmonology for possible thoracentesis, which he underwent on 1/9 after Plavix was held for 5d. Fluid studies were consistent with a transudative process likely from heart failure exacerbation. The patient's GDMT for heart failure was optimized and he was started on Farxiga and Entresto. During this admission, the patient was also seen by psych due to worsening depression; no changes were made.     The patient is afebrile, hemodynamically stable and medically optimized for discharge to home with follow-up with cardiology, pulmonology, CT surgery, and his PCP.

## 2023-01-06 NOTE — DISCHARGE NOTE PROVIDER - NSDCCPTREATMENT_GEN_ALL_CORE_FT
PRINCIPAL PROCEDURE  Procedure: CT angiogram chest w contrast  Findings and Treatment: FINDINGS:  CTA: The study is technically adequate with a good contrast bolus to the   pulmonary arteries. No pulmonary embolism. The great vessels are normal   in size. The heart is normal in size. No pericardial effusion. Coronary   arterial and aortic valvular calcification is present.  Lungs/Airways/Pleura: Status post right upper lobectomy. Mildly increased   moderate loculated right pleural effusion since 12/27/2022. No   pneumothorax. A 1.7 cm solid nodule in the apicoposterior left upper lobe   on series 5, image 45 is unchanged since 12/27/2022 and enlarged since   7/28/2022 where it measured 4 mm.  Mediastinum/Lymph nodes: No thoracic adenopathy.  Upper Abdomen: Small volume ascites. Stable left adrenal nodularity,   previously characterized as an adenoma on abdominal MRI 8/10/2022.   Cholelithiasis.  Bones and Soft Tissues: No aggressive osseous lesions.  IMPRESSION:  No pulmonary embolism.  Mildly increased moderateloculated right pleural effusion since   12/27/2022. Status post post right upper lobectomy.  Indeterminate 1.7 cm apicoposterior left upper lobe nodule, enlarged   since July 2022 (4 mm). Consider follow-up chest CT in 3 months, PET/CT   and/or tissue sampling for further evaluation.      SECONDARY PROCEDURE  Procedure: Transthoracic echocardiography (TTE)  Findings and Treatment: EF (Pizarro Rule): 27 %Doppler Peak Velocity (m/sec):  MV=0.9 AoV=0.8  Conclusions:  1. Normal mitral valve. Severe mitral regurgitation. The  EROA using flow convergence method is 0.24 cm2,  MR  regurgitant volume is 36 mL/beat. MR vena contracta is 0.51  cm.  2. Normal trileaflet aortic valve. Mild-moderate aortic  regurgitation.  3. Eccentric left ventricular hypertrophy (dilated left  ventricle with normal relative wall thickness).Severe  segmental left ventricular systolic dysfunction. LVEF  calculated using biplane Pizarro's method is 27%. Akinesis  of the apical, septal, inferior walls with remaining walls  hypokinetic.  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 Unable to determine diastolic function due to  mrerged E and A wave.  4. Severe right atrial enlargement.Right ventricular  enlargement with decreased right ventricular systolic  function.  5. Normal tricuspid valve. Mild tricuspid regurgitation.  *** Compared with echocardiogram of 5/20/2022, there is no  longer an LV thrombus and no pericardial effusion.  ------------------------------------------------------------------------  Confirmed on  1/4/2023 - 12:25:08 by Julienne Foley M.D.  ------------------------------------------------------------------------

## 2023-01-06 NOTE — DISCHARGE NOTE PROVIDER - CARE PROVIDERS DIRECT ADDRESSES
,[email or web address]

## 2023-01-06 NOTE — PROGRESS NOTE ADULT - PROBLEM SELECTOR PLAN 2
CTA (1/3) showed moderate loculated R pleural effusion, slightly increased from prior. Given hx of lung adenocarcinoma and larger HARRY pulmonary nodule, suspect malignant effusion vs. less likely infectious given patient is afebrile without a WBC count, and non-toxic appearing   - pulm consulted for diagnostic +/- therapeutic thoracentesis and biopsy of pulmonary nodule; appreciate recs- tentative plan for Monday 1/9  - c/w IV Lasix 40mg qd for continued diuresis  - holding Plavix 75mg qd x 5d for possible procedure (safe to do so from cardiology perspective)  - monitor off of abx for now as pt. is afebrile and without leukocytosis CTA (1/3) showed moderate loculated R pleural effusion, slightly increased from prior. Given hx of lung adenocarcinoma and larger HARRY pulmonary nodule, suspect malignant effusion vs. less likely infectious given patient is afebrile without a WBC count, and non-toxic appearing   - pulm consulted for diagnostic +/- therapeutic thoracentesis and biopsy of pulmonary nodule; appreciate recs- tentative plan for Monday 1/9  - c/w IV Lasix 40mg qd for continued diuresis  - holding Plavix 75mg qd x 5d for possible procedure (safe to do so from cardiology perspective)  - monitor off abx for now as pt. is afebrile and without leukocytosis

## 2023-01-06 NOTE — PROGRESS NOTE ADULT - SUBJECTIVE AND OBJECTIVE BOX
*******************************  Pamella Brambila MD (PGY-1)  Internal Medicine  Contact via [employer] TEAMS  [facility] Pager: [phone]  [facility] Pager: [phone]  *******************************    PROGRESS NOTE:     Patient is a 77y old  Male who presents with a chief complaint of shortness of breath (05 Jan 2023 15:33)    INTERVAL EVENTS: No acute overnight events.     SUBJECTIVE: Patient seen and examined at bedside. This morning, the patient is comfortable and doing well. No acute complaints. Denies fevers, chills, N/V/D, chest pain, SOB, abdominal pain.    MEDICATIONS  (STANDING):  aspirin  chewable 81 milliGRAM(s) Oral daily  furosemide   Injectable 40 milliGRAM(s) IV Push <User Schedule>  latanoprost 0.005% Ophthalmic Solution 1 Drop(s) Both EYES at bedtime  metoprolol succinate ER 25 milliGRAM(s) Oral two times a day  mirtazapine 30 milliGRAM(s) Oral daily  sacubitril 24 mG/valsartan 26 mG 1 Tablet(s) Oral two times a day  spironolactone 12.5 milliGRAM(s) Oral daily  tamsulosin 0.4 milliGRAM(s) Oral at bedtime  venlafaxine XR. 150 milliGRAM(s) Oral daily    MEDICATIONS  (PRN):  zolpidem 5 milliGRAM(s) Oral at bedtime PRN Insomnia    CAPILLARY BLOOD GLUCOSE    I&O's Summary    05 Jan 2023 07:01  -  06 Jan 2023 07:00  --------------------------------------------------------  IN: 1140 mL / OUT: 2800 mL / NET: -1660 mL    PHYSICAL EXAM:  Vital Signs Last 24 Hrs  T(C): 36.3 (06 Jan 2023 06:06), Max: 36.6 (05 Jan 2023 17:08)  T(F): 97.4 (06 Jan 2023 06:06), Max: 97.9 (05 Jan 2023 22:03)  HR: 67 (06 Jan 2023 06:06) (63 - 78)  BP: 92/53 (06 Jan 2023 06:06) (85/56 - 103/66)  BP(mean): --  RR: 18 (06 Jan 2023 06:06) (17 - 18)  SpO2: 97% (06 Jan 2023 06:06) (96% - 97%)    Parameters below as of 06 Jan 2023 06:06  Patient On (Oxygen Delivery Method): room air    GENERAL: NAD, lying in bed comfortably  NECK: +JVP up to mandible  HEART: S1, S2, Regular rate and rhythm, no murmurs, rubs, or gallops  LUNGS: decreased breath sounds at base of right lung. Unlabored respirations, no crackles, wheezing, or rhonchi  ABDOMEN: Soft, nontender, nondistended, +BS  EXTREMITIES: 2+ peripheral pulses bilaterally. 1-2+ LE edema  NERVOUS SYSTEM:  A&Ox3, no focal deficits   SKIN: No rashes or lesions    LABS:                        11.7   5.78  )-----------( 162      ( 05 Jan 2023 07:27 )             36.7     01-05    138  |  104  |  45<H>  ----------------------------<  94  3.7   |  20<L>  |  1.60<H>    Ca    8.8      05 Jan 2023 07:25  Phos  3.6     01-05    Culture - Urine (collected 03 Jan 2023 21:11)  Source: Clean Catch Clean Catch (Midstream)  Final Report (04 Jan 2023 22:19):    <10,000 CFU/mL Normal Urogenital Jodee    RADIOLOGY & ADDITIONAL TESTS:  Results Reviewed:   Imaging Personally Reviewed:  Electrocardiogram Personally Reviewed:  Tele:    COORDINATION OF CARE:  Care Discussed with Consultants/Other Providers [Y/N]:  Prior or Outpatient Records Reviewed [Y/N]: *******************************  Pamella Brambila MD (PGY-1)  Internal Medicine  Contact via [employer] TEAMS  [facility] Pager: [phone]  [facility] Pager: [phone]  *******************************    PROGRESS NOTE:     Patient is a 77y old  Male who presents with a chief complaint of shortness of breath (05 Jan 2023 15:33)    INTERVAL EVENTS: No acute overnight events.     SUBJECTIVE: Patient seen and examined at bedside. This morning, the patient is comfortable and doing well. No acute complaints. Says his SOB is overall better and he has been ambulatory. Denies fevers, chills, N/V/D, chest pain, SOB, abdominal pain, palpitations.    MEDICATIONS  (STANDING):  aspirin  chewable 81 milliGRAM(s) Oral daily  furosemide   Injectable 40 milliGRAM(s) IV Push <User Schedule>  latanoprost 0.005% Ophthalmic Solution 1 Drop(s) Both EYES at bedtime  metoprolol succinate ER 25 milliGRAM(s) Oral two times a day  mirtazapine 30 milliGRAM(s) Oral daily  sacubitril 24 mG/valsartan 26 mG 1 Tablet(s) Oral two times a day  spironolactone 12.5 milliGRAM(s) Oral daily  tamsulosin 0.4 milliGRAM(s) Oral at bedtime  venlafaxine XR. 150 milliGRAM(s) Oral daily    MEDICATIONS  (PRN):  zolpidem 5 milliGRAM(s) Oral at bedtime PRN Insomnia    CAPILLARY BLOOD GLUCOSE    I&O's Summary    05 Jan 2023 07:01  -  06 Jan 2023 07:00  --------------------------------------------------------  IN: 1140 mL / OUT: 2800 mL / NET: -1660 mL    PHYSICAL EXAM:  Vital Signs Last 24 Hrs  T(C): 36.3 (06 Jan 2023 06:06), Max: 36.6 (05 Jan 2023 17:08)  T(F): 97.4 (06 Jan 2023 06:06), Max: 97.9 (05 Jan 2023 22:03)  HR: 67 (06 Jan 2023 06:06) (63 - 78)  BP: 92/53 (06 Jan 2023 06:06) (85/56 - 103/66)  BP(mean): --  RR: 18 (06 Jan 2023 06:06) (17 - 18)  SpO2: 97% (06 Jan 2023 06:06) (96% - 97%)    Parameters below as of 06 Jan 2023 06:06  Patient On (Oxygen Delivery Method): room air    GENERAL: NAD, lying in bed comfortably  NECK: +JVP up to mandible  HEART: S1, S2, Regular rate and rhythm, no murmurs, rubs, or gallops  LUNGS: decreased breath sounds at base of right lung. Unlabored respirations, no crackles, wheezing, or rhonchi  ABDOMEN: Soft, nontender, nondistended, +BS  EXTREMITIES: 2+ peripheral pulses bilaterally. 1-2+ LE edema  NERVOUS SYSTEM:  A&Ox3, no focal deficits   SKIN: No rashes or lesions    LABS:                        11.1   5.77  )-----------( 168      ( 06 Jan 2023 07:19 )             34.3     01-06    136  |  103  |  43<H>  ----------------------------<  96  3.7   |  21<L>  |  1.56<H>    Ca    8.7      06 Jan 2023 07:19  Phos  3.6     01-06  Mg     2.0     01-06    Culture - Urine (collected 03 Jan 2023 21:11)  Source: Clean Catch Clean Catch (Midstream)  Final Report (04 Jan 2023 22:19):    <10,000 CFU/mL Normal Urogenital Jodee    RADIOLOGY & ADDITIONAL TESTS:  Results Reviewed:   Imaging Personally Reviewed:  Electrocardiogram Personally Reviewed:  Tele:    COORDINATION OF CARE:  Care Discussed with Consultants/Other Providers [Y/N]:  Prior or Outpatient Records Reviewed [Y/N]: *******************************  Pamella Brambila MD (PGY-1)  Internal Medicine  Contact via [employer] TEAMS  [facility] Pager: [phone]  [facility] Pager: [phone]  *******************************    PROGRESS NOTE:     Patient is a 77y old  Male who presents with a chief complaint of shortness of breath (05 Jan 2023 15:33)    INTERVAL EVENTS: No acute overnight events.     SUBJECTIVE: Patient seen and examined at bedside. This morning, the patient is comfortable and doing well. No acute complaints. Says his SOB is overall better and he has been ambulatory. Reports poor sleep overnight due to anxiety about his brother passing away and about thoracentesis planned for next week. Denies fevers, chills, N/V/D, chest pain, SOB, abdominal pain, palpitations.    MEDICATIONS  (STANDING):  aspirin  chewable 81 milliGRAM(s) Oral daily  furosemide   Injectable 40 milliGRAM(s) IV Push <User Schedule>  latanoprost 0.005% Ophthalmic Solution 1 Drop(s) Both EYES at bedtime  metoprolol succinate ER 25 milliGRAM(s) Oral two times a day  mirtazapine 30 milliGRAM(s) Oral daily  sacubitril 24 mG/valsartan 26 mG 1 Tablet(s) Oral two times a day  spironolactone 12.5 milliGRAM(s) Oral daily  tamsulosin 0.4 milliGRAM(s) Oral at bedtime  venlafaxine XR. 150 milliGRAM(s) Oral daily    MEDICATIONS  (PRN):  zolpidem 5 milliGRAM(s) Oral at bedtime PRN Insomnia    CAPILLARY BLOOD GLUCOSE    I&O's Summary    05 Jan 2023 07:01  -  06 Jan 2023 07:00  --------------------------------------------------------  IN: 1140 mL / OUT: 2800 mL / NET: -1660 mL    PHYSICAL EXAM:  Vital Signs Last 24 Hrs  T(C): 36.3 (06 Jan 2023 06:06), Max: 36.6 (05 Jan 2023 17:08)  T(F): 97.4 (06 Jan 2023 06:06), Max: 97.9 (05 Jan 2023 22:03)  HR: 67 (06 Jan 2023 06:06) (63 - 78)  BP: 92/53 (06 Jan 2023 06:06) (85/56 - 103/66)  BP(mean): --  RR: 18 (06 Jan 2023 06:06) (17 - 18)  SpO2: 97% (06 Jan 2023 06:06) (96% - 97%)    Parameters below as of 06 Jan 2023 06:06  Patient On (Oxygen Delivery Method): room air    GENERAL: NAD, lying in bed comfortably  NECK: +JVP up to mandible  HEART: S1, S2, Regular rate and rhythm, no murmurs, rubs, or gallops  LUNGS: decreased breath sounds at base of right lung. Unlabored respirations, no crackles, wheezing, or rhonchi  ABDOMEN: Soft, nontender, nondistended, +BS  EXTREMITIES: 2+ peripheral pulses bilaterally. 1-2+ LE edema  NERVOUS SYSTEM:  A&Ox3, no focal deficits   SKIN: No rashes or lesions    LABS:                        11.1   5.77  )-----------( 168      ( 06 Jan 2023 07:19 )             34.3     01-06    136  |  103  |  43<H>  ----------------------------<  96  3.7   |  21<L>  |  1.56<H>    Ca    8.7      06 Jan 2023 07:19  Phos  3.6     01-06  Mg     2.0     01-06    Culture - Urine (collected 03 Jan 2023 21:11)  Source: Clean Catch Clean Catch (Midstream)  Final Report (04 Jan 2023 22:19):    <10,000 CFU/mL Normal Urogenital Jodee    RADIOLOGY & ADDITIONAL TESTS:  Results Reviewed:   Imaging Personally Reviewed:  Electrocardiogram Personally Reviewed:  Tele:    COORDINATION OF CARE:  Care Discussed with Consultants/Other Providers [Y/N]:  Prior or Outpatient Records Reviewed [Y/N]:

## 2023-01-06 NOTE — DISCHARGE NOTE PROVIDER - NSDCFUADDAPPT_GEN_ALL_CORE_FT
Please follow up with Dr. Walden (PCP/cardiologist), Dr. Padilla (thoracic surgery), and Dr. Vaughan (pulmonology) within 1-2 weeks of discharge from the hospital.

## 2023-01-06 NOTE — PROGRESS NOTE ADULT - PROBLEM SELECTOR PLAN 1
Pt. p/w 2 weeks worsening dyspnea on exertion, PND and orthopnea x 2 weeks. Pro-BNP 5600 on admission w/ CTA showing moderate loculated R pleural effusion and pt. w/ +entero/rhinovirus on RVP. Presentation most likely 2/2 acute on chronic CHF exacerbation i/s/o entero/rhinovirus vs. pleural effusion   - TTE (11/2022): EF 22% w/ severe LV systolic dysfunction, decreased RV systolic function, and severe pHTN  - TTE (1/4/2023): EF 27% w/ eccentric LVH, severe LVSD, akinesis of apical, septal, inferior walls and hypokinesis of remaining walls, severe LAE w/ dec RVSF  - HF following; appreciate recs  - c/w IV Lasix 40mg qd for continued diuresis  - c/w Aldactone 12.5mg qd  - c/w metoprolol succinate 50mg qd  - c/w Entresto 24-26mg qd  - holding Farxiga for now  - strict I/Os, daily standing weights  - monitor electrolytes and replete for K>4 and Mg>2 Pt. p/w 2 weeks worsening dyspnea on exertion, PND and orthopnea x 2 weeks. Pro-BNP 5600 on admission w/ CTA showing moderate loculated R pleural effusion and pt. w/ +entero/rhinovirus on RVP. Presentation most likely 2/2 acute on chronic CHF exacerbation i/s/o entero/rhinovirus  - TTE (11/2022): EF 22% w/ severe LV systolic dysfunction, decreased RV systolic function, and severe pHTN  - TTE (1/4/2023): EF 27% w/ eccentric LVH, severe LVSD, akinesis of apical, septal, inferior walls and hypokinesis of remaining walls, severe LAE w/ dec RVSF  - HF following; appreciate recs  - c/w IV Lasix 40mg qd for continued diuresis  - c/w Aldactone 12.5mg qd  - c/w metoprolol succinate 50mg qd  - c/w Entresto 24-26mg qd  - holding Farxiga for now  - strict I/Os, daily standing weights  - monitor electrolytes and replete for K>4 and Mg>2

## 2023-01-06 NOTE — DISCHARGE NOTE PROVIDER - PROVIDER TOKENS
PROVIDER:[TOKEN:[[id number]],SCHEDULEDAPPT:[01/24/2023]],PROVIDER:[TOKEN:[[id number]],FOLLOWUP:[2 weeks]],PROVIDER:[TOKEN:[[id number]],FOLLOWUP:[1 week]]

## 2023-01-07 LAB
ANION GAP SERPL CALC-SCNC: 11 MMOL/L — SIGNIFICANT CHANGE UP (ref 5–17)
BUN SERPL-MCNC: 34 MG/DL — HIGH (ref 7–23)
CALCIUM SERPL-MCNC: 9.1 MG/DL — SIGNIFICANT CHANGE UP (ref 8.4–10.5)
CHLORIDE SERPL-SCNC: 102 MMOL/L — SIGNIFICANT CHANGE UP (ref 96–108)
CO2 SERPL-SCNC: 24 MMOL/L — SIGNIFICANT CHANGE UP (ref 22–31)
CREAT SERPL-MCNC: 1.18 MG/DL — SIGNIFICANT CHANGE UP (ref 0.5–1.3)
EGFR: 64 ML/MIN/1.73M2 — SIGNIFICANT CHANGE UP
GLUCOSE SERPL-MCNC: 91 MG/DL — SIGNIFICANT CHANGE UP (ref 70–99)
HCT VFR BLD CALC: 37 % — LOW (ref 39–50)
HGB BLD-MCNC: 11.9 G/DL — LOW (ref 13–17)
MAGNESIUM SERPL-MCNC: 2.1 MG/DL — SIGNIFICANT CHANGE UP (ref 1.6–2.6)
MCHC RBC-ENTMCNC: 29 PG — SIGNIFICANT CHANGE UP (ref 27–34)
MCHC RBC-ENTMCNC: 32.2 GM/DL — SIGNIFICANT CHANGE UP (ref 32–36)
MCV RBC AUTO: 90 FL — SIGNIFICANT CHANGE UP (ref 80–100)
NRBC # BLD: 0 /100 WBCS — SIGNIFICANT CHANGE UP (ref 0–0)
PHOSPHATE SERPL-MCNC: 3.3 MG/DL — SIGNIFICANT CHANGE UP (ref 2.5–4.5)
PLATELET # BLD AUTO: 184 K/UL — SIGNIFICANT CHANGE UP (ref 150–400)
POTASSIUM SERPL-MCNC: 3.6 MMOL/L — SIGNIFICANT CHANGE UP (ref 3.5–5.3)
POTASSIUM SERPL-SCNC: 3.6 MMOL/L — SIGNIFICANT CHANGE UP (ref 3.5–5.3)
RBC # BLD: 4.11 M/UL — LOW (ref 4.2–5.8)
RBC # FLD: 17.2 % — HIGH (ref 10.3–14.5)
SODIUM SERPL-SCNC: 137 MMOL/L — SIGNIFICANT CHANGE UP (ref 135–145)
WBC # BLD: 5.35 K/UL — SIGNIFICANT CHANGE UP (ref 3.8–10.5)
WBC # FLD AUTO: 5.35 K/UL — SIGNIFICANT CHANGE UP (ref 3.8–10.5)

## 2023-01-07 PROCEDURE — 99233 SBSQ HOSP IP/OBS HIGH 50: CPT | Mod: GC

## 2023-01-07 RX ORDER — SODIUM CHLORIDE 9 MG/ML
250 INJECTION, SOLUTION INTRAVENOUS ONCE
Refills: 0 | Status: COMPLETED | OUTPATIENT
Start: 2023-01-07 | End: 2023-01-07

## 2023-01-07 RX ORDER — POLYETHYLENE GLYCOL 3350 17 G/17G
17 POWDER, FOR SOLUTION ORAL DAILY
Refills: 0 | Status: DISCONTINUED | OUTPATIENT
Start: 2023-01-07 | End: 2023-01-10

## 2023-01-07 RX ADMIN — POLYETHYLENE GLYCOL 3350 17 GRAM(S): 17 POWDER, FOR SOLUTION ORAL at 10:30

## 2023-01-07 RX ADMIN — SODIUM CHLORIDE 500 MILLILITER(S): 9 INJECTION, SOLUTION INTRAVENOUS at 07:06

## 2023-01-07 RX ADMIN — SACUBITRIL AND VALSARTAN 1 TABLET(S): 24; 26 TABLET, FILM COATED ORAL at 17:43

## 2023-01-07 RX ADMIN — TAMSULOSIN HYDROCHLORIDE 0.4 MILLIGRAM(S): 0.4 CAPSULE ORAL at 22:24

## 2023-01-07 RX ADMIN — Medication 150 MILLIGRAM(S): at 12:12

## 2023-01-07 RX ADMIN — LATANOPROST 1 DROP(S): 0.05 SOLUTION/ DROPS OPHTHALMIC; TOPICAL at 22:23

## 2023-01-07 RX ADMIN — Medication 81 MILLIGRAM(S): at 12:25

## 2023-01-07 RX ADMIN — Medication 25 MILLIGRAM(S): at 17:44

## 2023-01-07 RX ADMIN — ZOLPIDEM TARTRATE 5 MILLIGRAM(S): 10 TABLET ORAL at 22:24

## 2023-01-07 RX ADMIN — MIRTAZAPINE 30 MILLIGRAM(S): 45 TABLET, ORALLY DISINTEGRATING ORAL at 22:24

## 2023-01-07 NOTE — PROGRESS NOTE ADULT - ASSESSMENT
77M with CAD s/p NSTEMI in May 2022, HFrEF (EF 22% in 11/2022), lung nodule s/p partial right lobectomy c/b cardiogenic shock, and depression who presented due to worsening shortness of breath with exertion x 2 weeks, orthopnea, paroxysmal nocturnal dyspnea, found to have elevated pro-BNP, +JVP, +entero/rhinovirus w/ imaging showing R pleural effusion. Presentation likely 2/2 acute on chronic CHF exacerbation i/s/o entero/rhinovirus, currently being diuresed and pending possible thoracentesis w/ pulmonology.

## 2023-01-07 NOTE — PROGRESS NOTE ADULT - PROBLEM SELECTOR PLAN 2
CTA (1/3) showed moderate loculated R pleural effusion, slightly increased from prior. Given hx of lung adenocarcinoma and larger HARRY pulmonary nodule, suspect malignant effusion vs. less likely infectious given patient is afebrile without a WBC count, and non-toxic appearing   - pulm consulted for diagnostic +/- therapeutic thoracentesis and biopsy of pulmonary nodule; appreciate recs- tentative plan for Monday 1/9  - c/w IV Lasix 40mg qd for continued diuresis  - holding Plavix 75mg qd x 5d for possible procedure (safe to do so from cardiology perspective)  - monitor off abx for now as pt. is afebrile and without leukocytosis

## 2023-01-07 NOTE — PROVIDER CONTACT NOTE (OTHER) - REASON
EXAMINATION TYPE: XR chest 2V

 

DATE OF EXAM: 1/22/2020

 

COMPARISON: Prior chest x-ray dated 9/6/2010

 

HISTORY: COPD and cough, D 64.9, J 44.9, E 78.5, E 55.9

 

TECHNIQUE:  Frontal and lateral views of the chest are obtained.

 

FINDINGS:  There is no focal air space opacity, pleural effusion, or pneumothorax seen.  The cardiac 
silhouette size is within normal limits.   The osseous structures are intact.

 

IMPRESSION:  No acute cardiopulmonary process.
pt blood pressure is 82/50
Pt BP is low, Pt IV was self removed accidentally with bleeding from IV site noted on bed sheets and throughout room and bathroom floor

## 2023-01-07 NOTE — PROVIDER CONTACT NOTE (OTHER) - BACKGROUND
Pt was admitted on 1/3/23 with worsening shortness of breath with exertion x 2 weeks, orthopnea
Pt was admitted on 1/3/23 with worsening shortness of breath with exertion x 2 weeks, orthopnea

## 2023-01-07 NOTE — PROVIDER CONTACT NOTE (OTHER) - SITUATION
pt blood pressure is 82/50
Pt BP is low, Pt IV was self removed accidentally with excessive bleeding from IV site

## 2023-01-07 NOTE — PROGRESS NOTE ADULT - PROBLEM SELECTOR PLAN 1
Pt. p/w 2 weeks worsening dyspnea on exertion, PND and orthopnea x 2 weeks. Pro-BNP 5600 on admission w/ CTA showing moderate loculated R pleural effusion and pt. w/ +entero/rhinovirus on RVP. Presentation most likely 2/2 acute on chronic CHF exacerbation i/s/o entero/rhinovirus  - TTE (11/2022): EF 22% w/ severe LV systolic dysfunction, decreased RV systolic function, and severe pHTN  - TTE (1/4/2023): EF 27% w/ eccentric LVH, severe LVSD, akinesis of apical, septal, inferior walls and hypokinesis of remaining walls, severe LAE w/ dec RVSF  - HF following; appreciate recs  - c/w IV Lasix 40mg qd for continued diuresis  - c/w Aldactone 12.5mg qd  - c/w metoprolol succinate 50mg qd  - c/w Entresto 24-26mg qd  - holding Farxiga for now  - strict I/Os, daily standing weights  - monitor electrolytes and replete for K>4 and Mg>2

## 2023-01-07 NOTE — PROVIDER CONTACT NOTE (OTHER) - ASSESSMENT
Pt is AAOx4, pt denies dizziness or lightheadedness. BP this am is 84/45. No c/o of CP or SOB. Pt reports peripheral IV was accidentally removed. Telemetry monitoring in place.
Pt is AAOx4. BP noted to be 82/50 manually. Pt denies dizziness or lightheadedness, no N/V. Tele monitoring in place

## 2023-01-07 NOTE — PROGRESS NOTE ADULT - SUBJECTIVE AND OBJECTIVE BOX
Shahbaz Sosa MD  PGY2  Preferred contact via [employer] Teams      Patient is a 77y old  Male who presents with a chief complaint of shortness of breath (06 Jan 2023 11:07)      SUBJECTIVE / OVERNIGHT EVENTS: Hypotensive this AM to 84/45. 250 CC Fluid Bolus ordered.     MEDICATIONS  (STANDING):  aspirin  chewable 81 milliGRAM(s) Oral daily  furosemide   Injectable 40 milliGRAM(s) IV Push <User Schedule>  latanoprost 0.005% Ophthalmic Solution 1 Drop(s) Both EYES at bedtime  metoprolol succinate ER 25 milliGRAM(s) Oral two times a day  mirtazapine 30 milliGRAM(s) Oral daily  sacubitril 24 mG/valsartan 26 mG 1 Tablet(s) Oral two times a day  spironolactone 12.5 milliGRAM(s) Oral daily  tamsulosin 0.4 milliGRAM(s) Oral at bedtime  venlafaxine XR. 150 milliGRAM(s) Oral daily    MEDICATIONS  (PRN):  zolpidem 5 milliGRAM(s) Oral at bedtime PRN Insomnia      CAPILLARY BLOOD GLUCOSE        I&O's Summary    06 Jan 2023 07:01  -  07 Jan 2023 07:00  --------------------------------------------------------  IN: 1050 mL / OUT: 450 mL / NET: 600 mL        Vital Signs Last 24 Hrs  T(C): 36.7 (07 Jan 2023 05:32), Max: 36.8 (07 Jan 2023 01:41)  T(F): 98.1 (07 Jan 2023 05:32), Max: 98.2 (07 Jan 2023 01:41)  HR: 64 (07 Jan 2023 05:32) (61 - 69)  BP: 84/45 (07 Jan 2023 05:32) (82/50 - 97/64)  BP(mean): --  RR: 18 (07 Jan 2023 05:32) (18 - 18)  SpO2: 96% (07 Jan 2023 05:32) (95% - 97%)    Parameters below as of 07 Jan 2023 05:32  Patient On (Oxygen Delivery Method): room air        PHYSICAL EXAM:      LABS:                        11.1   5.77  )-----------( 168      ( 06 Jan 2023 07:19 )             34.3      01-06    136  |  103  |  43<H>  ----------------------------<  96  3.7   |  21<L>  |  1.56<H>    Ca    8.7      06 Jan 2023 07:19  Phos  3.6     01-06  Mg     2.0     01-06                RADIOLOGY & ADDITIONAL TESTS:    Imaging Personally Reviewed:    Consultant(s) Notes Reviewed:      Care Discussed with Consultants/Other Providers:   Shahbaz Sosa MD  PGY2  Preferred contact via [employer] Teams      Patient is a 77y old  Male who presents with a chief complaint of shortness of breath (06 Jan 2023 11:07)      SUBJECTIVE / OVERNIGHT EVENTS: Hypotensive this AM to 84/45. 250 CC Fluid Bolus ordered. Patient seen and examined at bedside. Appears well and states he is no longer SOB. Additionally denies chest pain, headache. N/V/D, abdominal pain.     MEDICATIONS  (STANDING):  aspirin  chewable 81 milliGRAM(s) Oral daily  furosemide   Injectable 40 milliGRAM(s) IV Push <User Schedule>  latanoprost 0.005% Ophthalmic Solution 1 Drop(s) Both EYES at bedtime  metoprolol succinate ER 25 milliGRAM(s) Oral two times a day  mirtazapine 30 milliGRAM(s) Oral daily  sacubitril 24 mG/valsartan 26 mG 1 Tablet(s) Oral two times a day  spironolactone 12.5 milliGRAM(s) Oral daily  tamsulosin 0.4 milliGRAM(s) Oral at bedtime  venlafaxine XR. 150 milliGRAM(s) Oral daily    MEDICATIONS  (PRN):  zolpidem 5 milliGRAM(s) Oral at bedtime PRN Insomnia      CAPILLARY BLOOD GLUCOSE        I&O's Summary    06 Jan 2023 07:01  -  07 Jan 2023 07:00  --------------------------------------------------------  IN: 1050 mL / OUT: 450 mL / NET: 600 mL      Vital Signs Last 24 Hrs  T(C): 36.3 (07 Jan 2023 10:03), Max: 36.8 (07 Jan 2023 01:41)  T(F): 97.4 (07 Jan 2023 10:03), Max: 98.2 (07 Jan 2023 01:41)  HR: 73 (07 Jan 2023 10:03) (64 - 73)  BP: 90/59 (07 Jan 2023 10:03) (82/50 - 97/64)  BP(mean): --  RR: 18 (07 Jan 2023 10:03) (18 - 18)  SpO2: 99% (07 Jan 2023 10:03) (95% - 99%)    Parameters below as of 07 Jan 2023 10:03  Patient On (Oxygen Delivery Method): room air        PHYSICAL EXAM:  GENERAL: NAD, well-groomed, well-developed  HEAD:  Atraumatic, Normocephalic  EYES: EOMI, PERRLA, conjunctiva and sclera clear  ENMT: No tonsillar erythema, exudates, or enlargement; Moist mucous membranes, Good dentition  NECK: Supple, No JVD  NERVOUS SYSTEM: AOX3, motor and sensation grossly intact in b/l UE and b/l LE  PSYCHIATRIC: Appropriate affect and mood  CHEST/LUNG: Clear to auscultation bilaterally; No rales, rhonchi, wheezing, or rubs  HEART: Regular rate and rhythm; No murmurs, rubs, or gallops. No LE edema  ABDOMEN: Soft, Nontender, Nondistended; Bowel sounds present  EXTREMITIES:  2+ Peripheral Pulses, No clubbing, cyanosis  SKIN: No rashes or lesions    LABS:                        11.1   5.77  )-----------( 168      ( 06 Jan 2023 07:19 )             34.3      01-06    136  |  103  |  43<H>  ----------------------------<  96  3.7   |  21<L>  |  1.56<H>    Ca    8.7      06 Jan 2023 07:19  Phos  3.6     01-06  Mg     2.0     01-06                RADIOLOGY & ADDITIONAL TESTS:    Imaging Personally Reviewed:    Consultant(s) Notes Reviewed:      Care Discussed with Consultants/Other Providers:

## 2023-01-08 DIAGNOSIS — G47.00 INSOMNIA, UNSPECIFIED: ICD-10-CM

## 2023-01-08 LAB
ANION GAP SERPL CALC-SCNC: 12 MMOL/L — SIGNIFICANT CHANGE UP (ref 5–17)
BUN SERPL-MCNC: 32 MG/DL — HIGH (ref 7–23)
CALCIUM SERPL-MCNC: 9.4 MG/DL — SIGNIFICANT CHANGE UP (ref 8.4–10.5)
CHLORIDE SERPL-SCNC: 102 MMOL/L — SIGNIFICANT CHANGE UP (ref 96–108)
CO2 SERPL-SCNC: 22 MMOL/L — SIGNIFICANT CHANGE UP (ref 22–31)
CREAT SERPL-MCNC: 1.12 MG/DL — SIGNIFICANT CHANGE UP (ref 0.5–1.3)
EGFR: 68 ML/MIN/1.73M2 — SIGNIFICANT CHANGE UP
GLUCOSE SERPL-MCNC: 97 MG/DL — SIGNIFICANT CHANGE UP (ref 70–99)
HCT VFR BLD CALC: 39.1 % — SIGNIFICANT CHANGE UP (ref 39–50)
HGB BLD-MCNC: 12.2 G/DL — LOW (ref 13–17)
MAGNESIUM SERPL-MCNC: 2.4 MG/DL — SIGNIFICANT CHANGE UP (ref 1.6–2.6)
MCHC RBC-ENTMCNC: 28.9 PG — SIGNIFICANT CHANGE UP (ref 27–34)
MCHC RBC-ENTMCNC: 31.2 GM/DL — LOW (ref 32–36)
MCV RBC AUTO: 92.7 FL — SIGNIFICANT CHANGE UP (ref 80–100)
NRBC # BLD: 0 /100 WBCS — SIGNIFICANT CHANGE UP (ref 0–0)
PHOSPHATE SERPL-MCNC: 3.6 MG/DL — SIGNIFICANT CHANGE UP (ref 2.5–4.5)
PLATELET # BLD AUTO: 204 K/UL — SIGNIFICANT CHANGE UP (ref 150–400)
POTASSIUM SERPL-MCNC: 4.3 MMOL/L — SIGNIFICANT CHANGE UP (ref 3.5–5.3)
POTASSIUM SERPL-SCNC: 4.3 MMOL/L — SIGNIFICANT CHANGE UP (ref 3.5–5.3)
RBC # BLD: 4.22 M/UL — SIGNIFICANT CHANGE UP (ref 4.2–5.8)
RBC # FLD: 17.2 % — HIGH (ref 10.3–14.5)
SODIUM SERPL-SCNC: 136 MMOL/L — SIGNIFICANT CHANGE UP (ref 135–145)
WBC # BLD: 5.96 K/UL — SIGNIFICANT CHANGE UP (ref 3.8–10.5)
WBC # FLD AUTO: 5.96 K/UL — SIGNIFICANT CHANGE UP (ref 3.8–10.5)

## 2023-01-08 PROCEDURE — 99232 SBSQ HOSP IP/OBS MODERATE 35: CPT | Mod: GC

## 2023-01-08 RX ORDER — PSYLLIUM SEED (WITH DEXTROSE)
1 POWDER (GRAM) ORAL THREE TIMES A DAY
Refills: 0 | Status: DISCONTINUED | OUTPATIENT
Start: 2023-01-08 | End: 2023-01-10

## 2023-01-08 RX ORDER — FUROSEMIDE 40 MG
40 TABLET ORAL
Refills: 0 | Status: DISCONTINUED | OUTPATIENT
Start: 2023-01-08 | End: 2023-01-10

## 2023-01-08 RX ADMIN — Medication 40 MILLIGRAM(S): at 06:26

## 2023-01-08 RX ADMIN — SPIRONOLACTONE 12.5 MILLIGRAM(S): 25 TABLET, FILM COATED ORAL at 11:18

## 2023-01-08 RX ADMIN — ZOLPIDEM TARTRATE 5 MILLIGRAM(S): 10 TABLET ORAL at 23:04

## 2023-01-08 RX ADMIN — MIRTAZAPINE 30 MILLIGRAM(S): 45 TABLET, ORALLY DISINTEGRATING ORAL at 21:36

## 2023-01-08 RX ADMIN — Medication 81 MILLIGRAM(S): at 11:17

## 2023-01-08 RX ADMIN — SACUBITRIL AND VALSARTAN 1 TABLET(S): 24; 26 TABLET, FILM COATED ORAL at 22:04

## 2023-01-08 RX ADMIN — Medication 25 MILLIGRAM(S): at 06:26

## 2023-01-08 RX ADMIN — SACUBITRIL AND VALSARTAN 1 TABLET(S): 24; 26 TABLET, FILM COATED ORAL at 11:17

## 2023-01-08 RX ADMIN — POLYETHYLENE GLYCOL 3350 17 GRAM(S): 17 POWDER, FOR SOLUTION ORAL at 11:18

## 2023-01-08 RX ADMIN — TAMSULOSIN HYDROCHLORIDE 0.4 MILLIGRAM(S): 0.4 CAPSULE ORAL at 21:36

## 2023-01-08 RX ADMIN — LATANOPROST 1 DROP(S): 0.05 SOLUTION/ DROPS OPHTHALMIC; TOPICAL at 21:36

## 2023-01-08 RX ADMIN — Medication 150 MILLIGRAM(S): at 11:17

## 2023-01-08 NOTE — PROGRESS NOTE ADULT - PROBLEM SELECTOR PLAN 2
CTA (1/3) showed moderate loculated R pleural effusion, slightly increased from prior. Given hx of lung adenocarcinoma and larger HARRY pulmonary nodule, suspect malignant effusion vs. less likely infectious given patient is afebrile without a WBC count, and non-toxic appearing   - pulm consulted for diagnostic +/- therapeutic thoracentesis and biopsy of pulmonary nodule; appreciate recs- tentative plan for Monday 1/9  - c/w IV Lasix 40mg qd for continued diuresis  - holding Plavix 75mg qd x 5d for possible procedure (safe to do so from cardiology perspective)  - monitor off abx for now as pt. is afebrile and without leukocytosis CTA (1/3) showed moderate loculated R pleural effusion, slightly increased from prior. Given hx of lung adenocarcinoma and larger HARRY pulmonary nodule, suspect malignant effusion vs. less likely infectious given patient is afebrile without a WBC count, and non-toxic appearing   - pulm consulted for diagnostic +/- therapeutic thoracentesis and biopsy of pulmonary nodule; appreciate recs- tentatively planned for Tuesday 1/10  - c/w IV Lasix 40mg qd for continued diuresis  - holding Plavix 75mg qd (safe to do so from cardiology perspective)  - monitor off abx for now as pt. is afebrile and without leukocytosis CTA (1/3) showed moderate loculated R pleural effusion, slightly increased from prior. Given hx of lung adenocarcinoma and larger HARRY pulmonary nodule, suspect malignant effusion vs. less likely infectious given patient is afebrile without a WBC count, and non-toxic appearing   - pulm consulted for diagnostic +/- therapeutic thoracentesis and biopsy of pulmonary nodule; appreciate recs- tentatively planned for Tuesday 1/10  - s/p IV Lasix 40mg qd; transitioned to PO Lasix 40mg qd for continued diuresis  - holding Plavix 75mg qd (safe to do so from cardiology perspective)  - monitor off abx for now as pt. is afebrile and without leukocytosis

## 2023-01-08 NOTE — PROGRESS NOTE ADULT - PROBLEM SELECTOR PLAN 1
Pt. p/w 2 weeks worsening dyspnea on exertion, PND and orthopnea x 2 weeks. Pro-BNP 5600 on admission w/ CTA showing moderate loculated R pleural effusion and pt. w/ +entero/rhinovirus on RVP. Presentation most likely 2/2 acute on chronic CHF exacerbation i/s/o entero/rhinovirus  - TTE (11/2022): EF 22% w/ severe LV systolic dysfunction, decreased RV systolic function, and severe pHTN  - TTE (1/4/2023): EF 27% w/ eccentric LVH, severe LVSD, akinesis of apical, septal, inferior walls and hypokinesis of remaining walls, severe LAE w/ dec RVSF  - HF following; appreciate recs  - c/w IV Lasix 40mg qd for continued diuresis  - c/w Aldactone 12.5mg qd  - c/w metoprolol succinate 50mg qd  - c/w Entresto 24-26mg qd  - holding Farxiga for now  - strict I/Os, daily standing weights  - monitor electrolytes and replete for K>4 and Mg>2 Pt. p/w 2 weeks worsening dyspnea on exertion, PND and orthopnea x 2 weeks. Pro-BNP 5600 on admission w/ CTA showing moderate loculated R pleural effusion and pt. w/ +entero/rhinovirus on RVP. Presentation most likely 2/2 acute on chronic CHF exacerbation i/s/o entero/rhinovirus  - TTE (11/2022): EF 22% w/ severe LV systolic dysfunction, decreased RV systolic function, and severe pHTN  - TTE (1/4/2023): EF 27% w/ eccentric LVH, severe LVSD, akinesis of apical, septal, inferior walls and hypokinesis of remaining walls, severe LAE w/ dec RVSF  - HF following; appreciate recs  - s/p IV Lasix 40mg qd; transitioned to PO Lasix 40mg qd for continued diuresis  - c/w Aldactone 12.5mg qd  - c/w metoprolol succinate 50mg qd  - c/w Entresto 24-26mg qd  - holding Farxiga for now  - strict I/Os, daily standing weights  - monitor electrolytes and replete for K>4 and Mg>2

## 2023-01-08 NOTE — PROGRESS NOTE ADULT - PROBLEM SELECTOR PLAN 4
Hx of NSTEMI in 5/2022 s/p cardiac cath showing RCA  and significant LAD and diagonal lesions w/ LILLIANA 3 flow. EKG nonischemic and trops in ED unremarkable (52 --> 45)  - TTE (11/2022): EF 22% w/ severe LV systolic dysfunction, decreased RV systolic function, and severe pHTN  - TTE (1/4/2023): EF 27% w/ eccentric LVH, severe LVSD, akinesis of apical, septal, inferior walls and hypokinesis of remaining walls, severe LAE w/ dec RVSF  - c/w ASA 81mg qd  - holding Plavix 75mg qd x 5d for possible procedure (safe to do so from cardiology perspective)  - c/w rosuvastatin 20mg qhs (patient cannot tolerate atorvastatin) Hx of NSTEMI in 5/2022 s/p cardiac cath showing RCA  and significant LAD and diagonal lesions w/ LILLIANA 3 flow. EKG nonischemic and trops in ED unremarkable (52 --> 45)  - TTE (11/2022): EF 22% w/ severe LV systolic dysfunction, decreased RV systolic function, and severe pHTN  - TTE (1/4/2023): EF 27% w/ eccentric LVH, severe LVSD, akinesis of apical, septal, inferior walls and hypokinesis of remaining walls, severe LAE w/ dec RVSF  - c/w ASA 81mg qd  - holding Plavix 75mg qd (safe to do so from cardiology perspective)  - c/w rosuvastatin 20mg qhs (patient cannot tolerate atorvastatin)

## 2023-01-08 NOTE — PROGRESS NOTE ADULT - PROBLEM SELECTOR PLAN 3
SCr on admission 1.77, baseline ~0.8; likely i/s/o cardiorenal syndrome  - urine urea 350, UCr 28, FEUrea 40.4%  - c/w IV Lasix 40mg qd for continued diuresis  - avoid nephrotoxic meds and renally dose medications   - monitor creatinine daily SCr on admission 1.77, baseline ~0.8; likely i/s/o cardiorenal syndrome  - urine urea 350, UCr 28, FEUrea 40.4%  - s/p IV Lasix 40mg qd; transitioned to PO Lasix 40mg qd for continued diuresis  - avoid nephrotoxic meds and renally dose medications   - monitor creatinine daily

## 2023-01-08 NOTE — PROGRESS NOTE ADULT - SUBJECTIVE AND OBJECTIVE BOX
*******************************  Pamella Brambila MD (PGY-1)  Internal Medicine  Contact via [employer] TEAMS  [facility] Pager: [phone]  [facility] Pager: [phone]  *******************************    PROGRESS NOTE:     Patient is a 77y old  Male who presents with a chief complaint of shortness of breath (07 Jan 2023 07:35)    INTERVAL EVENTS: No acute overnight events.     SUBJECTIVE: Patient seen and examined at bedside. This morning, the patient is comfortable and doing well. No acute complaints. Denies fevers, chills, N/V/D, chest pain, SOB, abdominal pain.    MEDICATIONS  (STANDING):  aspirin  chewable 81 milliGRAM(s) Oral daily  furosemide   Injectable 40 milliGRAM(s) IV Push <User Schedule>  latanoprost 0.005% Ophthalmic Solution 1 Drop(s) Both EYES at bedtime  metoprolol succinate ER 25 milliGRAM(s) Oral two times a day  mirtazapine 30 milliGRAM(s) Oral daily  polyethylene glycol 3350 17 Gram(s) Oral daily  sacubitril 24 mG/valsartan 26 mG 1 Tablet(s) Oral two times a day  spironolactone 12.5 milliGRAM(s) Oral daily  tamsulosin 0.4 milliGRAM(s) Oral at bedtime  venlafaxine XR. 150 milliGRAM(s) Oral daily    MEDICATIONS  (PRN):  zolpidem 5 milliGRAM(s) Oral at bedtime PRN Insomnia    CAPILLARY BLOOD GLUCOSE    I&O's Summary    07 Jan 2023 07:01  -  08 Jan 2023 07:00  --------------------------------------------------------  IN: 1230 mL / OUT: 1300 mL / NET: -70 mL    PHYSICAL EXAM:  Vital Signs Last 24 Hrs  T(C): 36.3 (08 Jan 2023 06:15), Max: 36.8 (07 Jan 2023 17:44)  T(F): 97.3 (08 Jan 2023 06:15), Max: 98.2 (07 Jan 2023 17:44)  HR: 65 (08 Jan 2023 06:15) (65 - 84)  BP: 95/67 (08 Jan 2023 06:15) (90/59 - 110/66)  BP(mean): --  RR: 18 (08 Jan 2023 06:15) (18 - 18)  SpO2: 98% (08 Jan 2023 06:15) (95% - 99%)    Parameters below as of 08 Jan 2023 06:15  Patient On (Oxygen Delivery Method): room air    GENERAL: NAD, lying in bed comfortably  NECK: +JVP up to mandible  HEART: S1, S2, Regular rate and rhythm, no murmurs, rubs, or gallops  LUNGS: decreased breath sounds at base of right lung. Unlabored respirations, no crackles, wheezing, or rhonchi  ABDOMEN: Soft, nontender, nondistended, +BS  EXTREMITIES: 2+ peripheral pulses bilaterally. 1-2+ LE edema  NERVOUS SYSTEM:  A&Ox3, no focal deficits   SKIN: No rashes or lesions    LABS:                        12.2   5.96  )-----------( 204      ( 08 Jan 2023 07:33 )             39.1     01-08    136  |  102  |  32<H>  ----------------------------<  97  4.3   |  22  |  1.12    Ca    9.4      08 Jan 2023 07:31  Phos  3.6     01-08  Mg     2.4     01-08    RADIOLOGY & ADDITIONAL TESTS:  Results Reviewed:   Imaging Personally Reviewed:  Electrocardiogram Personally Reviewed:  Tele:    COORDINATION OF CARE:  Care Discussed with Consultants/Other Providers [Y/N]:  Prior or Outpatient Records Reviewed [Y/N]:   *******************************  Pamella Brambila MD (PGY-1)  Internal Medicine  Contact via [employer] TEAMS  [facility] Pager: [phone]  [facility] Pager: [phone]  *******************************    PROGRESS NOTE:     Patient is a 77y old  Male who presents with a chief complaint of shortness of breath (07 Jan 2023 07:35)    INTERVAL EVENTS: No acute overnight events.     SUBJECTIVE: Patient seen and examined at bedside. This morning, the patient is comfortable and doing well. No acute complaints. Reports no BM since past 2 days. Pt. has been ambulating without issues, states his breathing is improved. Denies fevers, chills, N/V/D, chest pain, SOB, abdominal pain.    MEDICATIONS  (STANDING):  aspirin  chewable 81 milliGRAM(s) Oral daily  furosemide   Injectable 40 milliGRAM(s) IV Push <User Schedule>  latanoprost 0.005% Ophthalmic Solution 1 Drop(s) Both EYES at bedtime  metoprolol succinate ER 25 milliGRAM(s) Oral two times a day  mirtazapine 30 milliGRAM(s) Oral daily  polyethylene glycol 3350 17 Gram(s) Oral daily  sacubitril 24 mG/valsartan 26 mG 1 Tablet(s) Oral two times a day  spironolactone 12.5 milliGRAM(s) Oral daily  tamsulosin 0.4 milliGRAM(s) Oral at bedtime  venlafaxine XR. 150 milliGRAM(s) Oral daily    MEDICATIONS  (PRN):  zolpidem 5 milliGRAM(s) Oral at bedtime PRN Insomnia    CAPILLARY BLOOD GLUCOSE    I&O's Summary    07 Jan 2023 07:01  -  08 Jan 2023 07:00  --------------------------------------------------------  IN: 1230 mL / OUT: 1300 mL / NET: -70 mL    PHYSICAL EXAM:  Vital Signs Last 24 Hrs  T(C): 36.3 (08 Jan 2023 06:15), Max: 36.8 (07 Jan 2023 17:44)  T(F): 97.3 (08 Jan 2023 06:15), Max: 98.2 (07 Jan 2023 17:44)  HR: 65 (08 Jan 2023 06:15) (65 - 84)  BP: 95/67 (08 Jan 2023 06:15) (90/59 - 110/66)  BP(mean): --  RR: 18 (08 Jan 2023 06:15) (18 - 18)  SpO2: 98% (08 Jan 2023 06:15) (95% - 99%)    Parameters below as of 08 Jan 2023 06:15  Patient On (Oxygen Delivery Method): room air    GENERAL: NAD, lying in bed comfortably  NECK: +JVP up to mid neck  HEART: S1, S2, Regular rate and rhythm, no murmurs, rubs, or gallops  LUNGS: decreased breath sounds at base of right lung. Unlabored respirations, no crackles, wheezing, or rhonchi  ABDOMEN: Soft, nontender, nondistended, +BS  EXTREMITIES: 2+ peripheral pulses bilaterally. 1-2+ LE edema, improving  NERVOUS SYSTEM:  A&Ox3, no focal deficits   SKIN: No rashes or lesions    LABS:                        12.2   5.96  )-----------( 204      ( 08 Jan 2023 07:33 )             39.1     01-08    136  |  102  |  32<H>  ----------------------------<  97  4.3   |  22  |  1.12    Ca    9.4      08 Jan 2023 07:31  Phos  3.6     01-08  Mg     2.4     01-08    RADIOLOGY & ADDITIONAL TESTS:  Results Reviewed:   Imaging Personally Reviewed:  Electrocardiogram Personally Reviewed:  Tele: NSR 60s-80s, PACs, PVCs    COORDINATION OF CARE:  Care Discussed with Consultants/Other Providers [Y/N]:  Prior or Outpatient Records Reviewed [Y/N]: *******************************  Pamella Brambila MD (PGY-1)  Internal Medicine  Contact via [employer] TEAMS  [facility] Pager: [phone]  [facility] Pager: [phone]  *******************************    PROGRESS NOTE:     Patient is a 77y old  Male who presents with a chief complaint of shortness of breath (07 Jan 2023 07:35)    INTERVAL EVENTS: No acute overnight events.     SUBJECTIVE: Patient seen and examined at bedside. This morning, the patient is comfortable and doing well. No acute complaints. Reports no BM since past 2 days. Pt. has been ambulating without issues, states his breathing is improved. Denies fevers, chills, N/V/D, chest pain, SOB, abdominal pain.    MEDICATIONS  (STANDING):  aspirin  chewable 81 milliGRAM(s) Oral daily  furosemide   Injectable 40 milliGRAM(s) IV Push <User Schedule>  latanoprost 0.005% Ophthalmic Solution 1 Drop(s) Both EYES at bedtime  metoprolol succinate ER 25 milliGRAM(s) Oral two times a day  mirtazapine 30 milliGRAM(s) Oral daily  polyethylene glycol 3350 17 Gram(s) Oral daily  sacubitril 24 mG/valsartan 26 mG 1 Tablet(s) Oral two times a day  spironolactone 12.5 milliGRAM(s) Oral daily  tamsulosin 0.4 milliGRAM(s) Oral at bedtime  venlafaxine XR. 150 milliGRAM(s) Oral daily    MEDICATIONS  (PRN):  psyllium Powder 1 Packet(s) Oral three times a day PRN constipation  zolpidem 5 milliGRAM(s) Oral at bedtime PRN Insomnia    CAPILLARY BLOOD GLUCOSE    I&O's Summary    07 Jan 2023 07:01  -  08 Jan 2023 07:00  --------------------------------------------------------  IN: 1230 mL / OUT: 1300 mL / NET: -70 mL    PHYSICAL EXAM:  Vital Signs Last 24 Hrs  T(C): 36.3 (08 Jan 2023 06:15), Max: 36.8 (07 Jan 2023 17:44)  T(F): 97.3 (08 Jan 2023 06:15), Max: 98.2 (07 Jan 2023 17:44)  HR: 65 (08 Jan 2023 06:15) (65 - 84)  BP: 95/67 (08 Jan 2023 06:15) (90/59 - 110/66)  BP(mean): --  RR: 18 (08 Jan 2023 06:15) (18 - 18)  SpO2: 98% (08 Jan 2023 06:15) (95% - 99%)    Parameters below as of 08 Jan 2023 06:15  Patient On (Oxygen Delivery Method): room air    GENERAL: NAD, lying in bed comfortably  NECK: +JVP up to mid neck  HEART: S1, S2, Regular rate and rhythm, no murmurs, rubs, or gallops  LUNGS: decreased breath sounds at base of right lung. Unlabored respirations, no crackles, wheezing, or rhonchi  ABDOMEN: Soft, nontender, nondistended, +BS  EXTREMITIES: 2+ peripheral pulses bilaterally. 1-2+ LE edema, improving  NERVOUS SYSTEM:  A&Ox3, no focal deficits   SKIN: No rashes or lesions    LABS:                        12.2   5.96  )-----------( 204      ( 08 Jan 2023 07:33 )             39.1     01-08    136  |  102  |  32<H>  ----------------------------<  97  4.3   |  22  |  1.12    Ca    9.4      08 Jan 2023 07:31  Phos  3.6     01-08  Mg     2.4     01-08    RADIOLOGY & ADDITIONAL TESTS:  Results Reviewed:   Imaging Personally Reviewed:  Electrocardiogram Personally Reviewed:  Tele: NSR 60s-80s, PACs, PVCs    COORDINATION OF CARE:  Care Discussed with Consultants/Other Providers [Y/N]:  Prior or Outpatient Records Reviewed [Y/N]: *******************************  Pamella Brambila MD (PGY-1)  Internal Medicine  Contact via [employer] TEAMS  [facility] Pager: [phone]  [facility] Pager: [phone]  *******************************    PROGRESS NOTE:     Patient is a 77y old  Male who presents with a chief complaint of shortness of breath (07 Jan 2023 07:35)    INTERVAL EVENTS: No acute overnight events.     SUBJECTIVE: Patient seen and examined at bedside. This morning, the patient is comfortable and doing well. No acute complaints. Reports no BM since past 2 days. Pt. has been ambulating without issues, states his breathing is improved. Denies fevers, chills, N/V/D, chest pain, SOB, abdominal pain.    MEDICATIONS  (STANDING):  aspirin  chewable 81 milliGRAM(s) Oral daily  furosemide    Tablet 40 milliGRAM(s) Oral <User Schedule>  latanoprost 0.005% Ophthalmic Solution 1 Drop(s) Both EYES at bedtime  metoprolol succinate ER 25 milliGRAM(s) Oral two times a day  mirtazapine 30 milliGRAM(s) Oral daily  polyethylene glycol 3350 17 Gram(s) Oral daily  sacubitril 24 mG/valsartan 26 mG 1 Tablet(s) Oral two times a day  spironolactone 12.5 milliGRAM(s) Oral daily  tamsulosin 0.4 milliGRAM(s) Oral at bedtime  venlafaxine XR. 150 milliGRAM(s) Oral daily    MEDICATIONS  (PRN):  psyllium Powder 1 Packet(s) Oral three times a day PRN constipation  zolpidem 5 milliGRAM(s) Oral at bedtime PRN Insomnia    CAPILLARY BLOOD GLUCOSE    I&O's Summary    07 Jan 2023 07:01  -  08 Jan 2023 07:00  --------------------------------------------------------  IN: 1230 mL / OUT: 1300 mL / NET: -70 mL    PHYSICAL EXAM:  Vital Signs Last 24 Hrs  T(C): 36.3 (08 Jan 2023 06:15), Max: 36.8 (07 Jan 2023 17:44)  T(F): 97.3 (08 Jan 2023 06:15), Max: 98.2 (07 Jan 2023 17:44)  HR: 65 (08 Jan 2023 06:15) (65 - 84)  BP: 95/67 (08 Jan 2023 06:15) (90/59 - 110/66)  BP(mean): --  RR: 18 (08 Jan 2023 06:15) (18 - 18)  SpO2: 98% (08 Jan 2023 06:15) (95% - 99%)    Parameters below as of 08 Jan 2023 06:15  Patient On (Oxygen Delivery Method): room air    GENERAL: NAD, lying in bed comfortably  NECK: +JVP up to mid neck  HEART: S1, S2, Regular rate and rhythm, no murmurs, rubs, or gallops  LUNGS: decreased breath sounds at base of right lung. Unlabored respirations, no crackles, wheezing, or rhonchi  ABDOMEN: Soft, nontender, nondistended, +BS  EXTREMITIES: 2+ peripheral pulses bilaterally. 1-2+ LE edema, improving  NERVOUS SYSTEM:  A&Ox3, no focal deficits   SKIN: No rashes or lesions    LABS:                        12.2   5.96  )-----------( 204      ( 08 Jan 2023 07:33 )             39.1     01-08    136  |  102  |  32<H>  ----------------------------<  97  4.3   |  22  |  1.12    Ca    9.4      08 Jan 2023 07:31  Phos  3.6     01-08  Mg     2.4     01-08    RADIOLOGY & ADDITIONAL TESTS:  Results Reviewed:   Imaging Personally Reviewed:  Electrocardiogram Personally Reviewed:  Tele: NSR 60s-80s, PACs, PVCs    COORDINATION OF CARE:  Care Discussed with Consultants/Other Providers [Y/N]:  Prior or Outpatient Records Reviewed [Y/N]:

## 2023-01-08 NOTE — PROGRESS NOTE ADULT - ASSESSMENT
77M with CAD s/p NSTEMI in May 2022, HFrEF (EF 22% in 11/2022), lung nodule s/p partial right lobectomy c/b cardiogenic shock, and depression who presented due to worsening shortness of breath with exertion x 2 weeks, orthopnea, paroxysmal nocturnal dyspnea, found to have elevated pro-BNP, +JVP, +entero/rhinovirus w/ imaging showing R pleural effusion. Presentation likely 2/2 acute on chronic CHF exacerbation i/s/o entero/rhinovirus, currently being diuresed and pending thoracentesis w/ pulmonology.

## 2023-01-08 NOTE — PROGRESS NOTE ADULT - PROBLEM SELECTOR PLAN 9
Diet: DASH/TLC  DVT PPx: SCDs; holding chemical VTE ppx for possible procedure w/ pulm   Dispo: pending clinical improvement  Code Status: full code Diet: DASH/TLC  DVT PPx: SCDs; holding chemical VTE ppx for thoracentesis w/ pulm  Dispo: pending clinical improvement  Code Status: full code - c/w home flomax 0.4mg qhs

## 2023-01-09 ENCOUNTER — APPOINTMENT (OUTPATIENT)
Dept: THORACIC SURGERY | Facility: CLINIC | Age: 78
End: 2023-01-09

## 2023-01-09 LAB
A1C WITH ESTIMATED AVERAGE GLUCOSE RESULT: 6.2 % — HIGH (ref 4–5.6)
ALBUMIN FLD-MCNC: 1.4 G/DL — SIGNIFICANT CHANGE UP
ANION GAP SERPL CALC-SCNC: 9 MMOL/L — SIGNIFICANT CHANGE UP (ref 5–17)
APTT BLD: 29.4 SEC — SIGNIFICANT CHANGE UP (ref 27.5–35.5)
B PERT IGG+IGM PNL SER: ABNORMAL
BUN SERPL-MCNC: 28 MG/DL — HIGH (ref 7–23)
CALCIUM SERPL-MCNC: 9 MG/DL — SIGNIFICANT CHANGE UP (ref 8.4–10.5)
CHLORIDE SERPL-SCNC: 105 MMOL/L — SIGNIFICANT CHANGE UP (ref 96–108)
CO2 SERPL-SCNC: 23 MMOL/L — SIGNIFICANT CHANGE UP (ref 22–31)
COLOR FLD: YELLOW — SIGNIFICANT CHANGE UP
CREAT SERPL-MCNC: 1.15 MG/DL — SIGNIFICANT CHANGE UP (ref 0.5–1.3)
EGFR: 66 ML/MIN/1.73M2 — SIGNIFICANT CHANGE UP
EOSINOPHIL # FLD: 1 % — SIGNIFICANT CHANGE UP
ESTIMATED AVERAGE GLUCOSE: 131 MG/DL — HIGH (ref 68–114)
FLUID INTAKE SUBSTANCE CLASS: SIGNIFICANT CHANGE UP
GLUCOSE FLD-MCNC: 119 MG/DL — SIGNIFICANT CHANGE UP
GLUCOSE SERPL-MCNC: 98 MG/DL — SIGNIFICANT CHANGE UP (ref 70–99)
GRAM STN FLD: SIGNIFICANT CHANGE UP
HCT VFR BLD CALC: 35.9 % — LOW (ref 39–50)
HGB BLD-MCNC: 11.4 G/DL — LOW (ref 13–17)
INR BLD: 1.08 RATIO — SIGNIFICANT CHANGE UP (ref 0.88–1.16)
LDH SERPL L TO P-CCNC: 78 U/L — SIGNIFICANT CHANGE UP
LYMPHOCYTES # FLD: 96 % — SIGNIFICANT CHANGE UP
MAGNESIUM SERPL-MCNC: 2.3 MG/DL — SIGNIFICANT CHANGE UP (ref 1.6–2.6)
MCHC RBC-ENTMCNC: 28.7 PG — SIGNIFICANT CHANGE UP (ref 27–34)
MCHC RBC-ENTMCNC: 31.8 GM/DL — LOW (ref 32–36)
MCV RBC AUTO: 90.4 FL — SIGNIFICANT CHANGE UP (ref 80–100)
MONOS+MACROS # FLD: 3 % — SIGNIFICANT CHANGE UP
NEUTROPHILS-BODY FLUID: 0 % — SIGNIFICANT CHANGE UP
NRBC # BLD: 0 /100 WBCS — SIGNIFICANT CHANGE UP (ref 0–0)
PH FLD: 7.54 — SIGNIFICANT CHANGE UP
PHOSPHATE SERPL-MCNC: 3 MG/DL — SIGNIFICANT CHANGE UP (ref 2.5–4.5)
PLATELET # BLD AUTO: 186 K/UL — SIGNIFICANT CHANGE UP (ref 150–400)
POTASSIUM SERPL-MCNC: 4.4 MMOL/L — SIGNIFICANT CHANGE UP (ref 3.5–5.3)
POTASSIUM SERPL-SCNC: 4.4 MMOL/L — SIGNIFICANT CHANGE UP (ref 3.5–5.3)
PROT FLD-MCNC: 2.3 G/DL — SIGNIFICANT CHANGE UP
PROTHROM AB SERPL-ACNC: 12.5 SEC — SIGNIFICANT CHANGE UP (ref 10.5–13.4)
RBC # BLD: 3.97 M/UL — LOW (ref 4.2–5.8)
RBC # FLD: 17.1 % — HIGH (ref 10.3–14.5)
RCV VOL RI: 5000 /UL — HIGH (ref 0–0)
SODIUM SERPL-SCNC: 137 MMOL/L — SIGNIFICANT CHANGE UP (ref 135–145)
SPECIMEN SOURCE: SIGNIFICANT CHANGE UP
TOTAL NUCLEATED CELL COUNT, BODY FLUID: 206 /UL — SIGNIFICANT CHANGE UP
TUBE TYPE: SIGNIFICANT CHANGE UP
WBC # BLD: 5.6 K/UL — SIGNIFICANT CHANGE UP (ref 3.8–10.5)
WBC # FLD AUTO: 5.6 K/UL — SIGNIFICANT CHANGE UP (ref 3.8–10.5)

## 2023-01-09 PROCEDURE — 99233 SBSQ HOSP IP/OBS HIGH 50: CPT | Mod: GC,25

## 2023-01-09 PROCEDURE — 88112 CYTOPATH CELL ENHANCE TECH: CPT | Mod: 26

## 2023-01-09 PROCEDURE — 99233 SBSQ HOSP IP/OBS HIGH 50: CPT | Mod: GC

## 2023-01-09 PROCEDURE — 71045 X-RAY EXAM CHEST 1 VIEW: CPT | Mod: 26

## 2023-01-09 PROCEDURE — 88305 TISSUE EXAM BY PATHOLOGIST: CPT | Mod: 26

## 2023-01-09 PROCEDURE — 32555 ASPIRATE PLEURA W/ IMAGING: CPT | Mod: GC

## 2023-01-09 RX ADMIN — SACUBITRIL AND VALSARTAN 1 TABLET(S): 24; 26 TABLET, FILM COATED ORAL at 08:31

## 2023-01-09 RX ADMIN — POLYETHYLENE GLYCOL 3350 17 GRAM(S): 17 POWDER, FOR SOLUTION ORAL at 12:45

## 2023-01-09 RX ADMIN — LATANOPROST 1 DROP(S): 0.05 SOLUTION/ DROPS OPHTHALMIC; TOPICAL at 23:03

## 2023-01-09 RX ADMIN — MIRTAZAPINE 30 MILLIGRAM(S): 45 TABLET, ORALLY DISINTEGRATING ORAL at 23:02

## 2023-01-09 RX ADMIN — Medication 150 MILLIGRAM(S): at 12:45

## 2023-01-09 RX ADMIN — Medication 81 MILLIGRAM(S): at 12:45

## 2023-01-09 RX ADMIN — ZOLPIDEM TARTRATE 5 MILLIGRAM(S): 10 TABLET ORAL at 23:06

## 2023-01-09 RX ADMIN — TAMSULOSIN HYDROCHLORIDE 0.4 MILLIGRAM(S): 0.4 CAPSULE ORAL at 23:02

## 2023-01-09 RX ADMIN — SACUBITRIL AND VALSARTAN 1 TABLET(S): 24; 26 TABLET, FILM COATED ORAL at 23:02

## 2023-01-09 RX ADMIN — Medication 25 MILLIGRAM(S): at 18:35

## 2023-01-09 RX ADMIN — Medication 40 MILLIGRAM(S): at 08:31

## 2023-01-09 NOTE — BH CONSULTATION LIAISON ASSESSMENT NOTE - NSBHCHARTREVIEWLAB_PSY_A_CORE FT
11.4   5.60  )-----------( 186      ( 09 Jan 2023 08:27 )             35.9   01-09    137  |  105  |  28<H>  ----------------------------<  98  4.4   |  23  |  1.15    Ca    9.0      09 Jan 2023 08:27  Phos  3.0     01-09  Mg     2.3     01-09

## 2023-01-09 NOTE — BH CONSULTATION LIAISON ASSESSMENT NOTE - NSBHATTESTCOMMENTATTENDFT_PSY_A_CORE
77 year-old man who is , domiciled with his wife, with no children,   PPHx of likely bipolar 2,  depression and anxiety, with PMHx of MI in May 2022, CHF, HLD and BPH, denying history of psychiatric admissions/suicide attempts, in outpatient psychiatric/therapy tx w/ Dr. Mcgee [phone] admitted to [facility] for HF 2/2 virus.  --  Patient seen and case discussed w/ fellow Dr. Partida, agree w/ above assessment. Patient notes severe episodes of apathy post initial MI and believes he has memory issues subsequently w/ inability to make decisions. Patient deemed to have MDD, moderate, recurrent episodes, and would rule out pseudodementia. Patient wishes psychiatry to inform him if he should leave current psychiatrist. Resources and education of medications provided, patient agrees with recommendations and wishes to remain on current psychotropic medications, but will reassess alternate medication options in outpatient setting either w/ current or new provider. No SI/HI/AVH currently, has alluded to passive SI in past but identifies spouse as protective factor. Crisis intervention resources reviewed and patient verbalized understanding.

## 2023-01-09 NOTE — PROGRESS NOTE ADULT - PROBLEM SELECTOR PLAN 5
CTA (1/3) showing 1.7cm solid nodule in apicoposterior left upper lobe, enlarged since July 2022 (4mm). Pt w/ hx of lung nodule s/p partial right lobectomy w/ path showing adenocarcinoma  - pulm consulted for possible biopsy; appreciate recs- to discuss w/ interventional pulm regarding biopsy  - Dr. Padilla's team aware per wife request  - if no inpatient workup, then consider follow-up chest CT in 3 months, PET/CT outpatient CTA (1/3) showing 1.7cm solid nodule in apicoposterior left upper lobe, enlarged since July 2022 (4mm). Pt w/ hx of lung nodule s/p partial right lobectomy w/ path showing adenocarcinoma  - pulm consulted for possible biopsy; appreciate recs- to discuss w/ interventional pulm regarding biopsy, however will pursue with thoracentesis first, likely today 1/9  - Dr. Padilla's team aware per wife request  - if no inpatient workup, then consider follow-up chest CT in 3 months, PET/CT outpatient

## 2023-01-09 NOTE — BH CONSULTATION LIAISON ASSESSMENT NOTE - HPI (INCLUDE ILLNESS QUALITY, SEVERITY, DURATION, TIMING, CONTEXT, MODIFYING FACTORS, ASSOCIATED SIGNS AND SYMPTOMS)
Patient is a 77 year-old man who is , domiciled with his wife, with no children,   PPHx of likely bipolar 2,  depression and anxiety, with PMHx of MI in May 2022, CHF, HLD and BPH, denying history of psychiatric admissions/suicide attempts, in outpatient psychiatric/therapy tx w/ Dr. Mcgee [phone] admitted to [facility] for HF 2/2 virus.    Psychiatry consulted for depression.     Chart reviewed. Patient currently on Effexor xr 150mg Qday , mirtazapine 30 QHS (increased 1-2 weeks ago) and ambien 5mg QHS PRN (started 1 week ago but onl ybegan while in hospital at [facility]).     Patient seen and examined. Alert/cooperative/oriented, depressed and anxious-appearing. He and wife share that patient's issues with mental health began  around 2002 when patient had been working as an . He was placed on paxil and stabilized for ~ 15 years until changing psychiatrists. At  that time, his new psychiatrist first tapered paxil and depressive sxs returned. Pt trialed on multiple medications since then including antipsychotics (only remembers abilfiy and seroquel at this time) with minimal to no improvement in depressive sxs since then. Patient then suffered MI in May of this year and his depression has worsened since then. States he has been experiencing no motivation, poor sleep (4-5 hours only), low energy, anhedonia, decreased concentration. Intermittent thoughts of wanting to die, occasional SI but  denies intention, method, planning. Denies issues with appetite. Identifies family as protective factors.    Also endorses constant worry and rumination. Lots of difficulty with making decisions. Denies panic attacks.  Denies manic symptoms. Denies psychotic sxs.   Of note, patient and wife are interested in adjusting his medications as they feel current regimen  is not working. Discuss keeping medications the same for right now but will discuss further with outpatient psychiatrist.     PPHX: No history of hospitalizations. Outpatient tx as above. No hx of SIB or SA.    SxHx:  x 40 years. Worked as  prior to custodial. No children    Substance: Patient denies tobacco smoking, marijuana, alcohol and illicit drug use    Famhx: Patient's mother had schizophrenia, required ECT.  One brother nad nephew also with schizophrenia. Another brother with depression    Collateral form wife: affirms psychiatric history as above. Affirms refractory depression/anxiety at this time. Denies acute safety concerns for patient. Patient is a 77 year-old man who is , domiciled with his wife, with no children,   PPHx of likely bipolar 2,  depression and anxiety, with PMHx of MI in May 2022, CHF, HLD and BPH, denying history of psychiatric admissions/suicide attempts, in outpatient psychiatric/therapy tx w/ Dr. Mcgee [phone] admitted to [facility] for HF 2/2 virus.    Psychiatry consulted for depression.     Chart reviewed. Patient currently on Effexor xr 150mg Qday , mirtazapine 30 QHS (increased 1-2 weeks ago) and ambien 5mg QHS PRN (started 1 week ago but only began while in hospital at [facility]).     Patient seen and examined. Alert/cooperative/oriented, depressed and anxious-appearing. He and wife share that patient's issues with mental health began  around 2002 when patient had been working as an . He was placed on paxil and stabilized for ~ 15 years until changing psychiatrists. At  that time, his new psychiatrist first tapered paxil and depressive sxs returned. Pt trialed on multiple medications since then including antipsychotics (only remembers abilfiy and seroquel at this time) with minimal to no improvement in depressive sxs since then. Patient then suffered MI in May of this year and his depression has worsened since then. States he has been experiencing no motivation, poor sleep (4-5 hours only), low energy, anhedonia, decreased concentration, psychomotor retardation. Intermittent thoughts of wanting to die, occasional SI but  denies intention, method, planning. Denies issues with appetite. Identifies family as protective factors.    Also endorses constant worry and rumination. Lots of difficulty with making decisions. Denies panic attacks.  Denies manic symptoms. Denies psychotic sxs.   Of note, patient and wife are interested in adjusting his medications as they feel current regimen  is not working. Discuss keeping medications the same for right now but will discuss further with outpatient psychiatrist.     PPHX: No history of hospitalizations. Outpatient tx as above. No hx of SIB or SA.  Patient denies of manic episodes but does describe history of hypomanic episodes.   By history, patient reports that prior to his first depressive episode in 2002, he had felt more than usual amounts of energy, was able to take on multiple tasks at once and felt his mood was very happy / elevated. It normalized after the first depressive episode in 2002 while on Paxil. THen in 2016 in [facility], patient experienced another similar episode in which he was not sleeping, had increased energy, began obsessing over "population research" It lasted for a few months until he immediately went into a depressive episode. No     SxHx:  x 40 years. Worked as  prior to half-way. No children    Substance: Patient denies tobacco smoking, marijuana, alcohol and illicit drug use    Famhx: Patient's mother had schizophrenia, required ECT.  One brother nad nephew also with schizophrenia. Another brother with depression    Collateral form wife: affirms psychiatric history as above. Affirms refractory depression/anxiety at this time. Denies acute safety concerns for patient.  77 year-old man who is , domiciled with his wife, with no children,   PPHx of likely bipolar 2,  depression and anxiety, with PMHx of MI in May 2022, CHF, HLD and BPH, denying history of psychiatric admissions/suicide attempts, in outpatient psychiatric/therapy tx w/ Dr. Mcgee [phone] admitted to [facility] for HF 2/2 virus.    Psychiatry consulted for depression.     Chart reviewed. Patient currently on Effexor xr 150mg Qday , mirtazapine 30 QHS (increased 1-2 weeks ago) and ambien 5mg QHS PRN (started 1 week ago but only began while in hospital at [facility]).     Patient seen and examined. Alert/cooperative/oriented, depressed and anxious-appearing. He and wife share that patient's issues with mental health began  around 2002 when patient had been working as an . He was placed on paxil and stabilized for ~ 15 years until changing psychiatrists. At  that time, his new psychiatrist first tapered paxil and depressive sxs returned. Pt trialed on multiple medications since then including antipsychotics (only remembers abilfiy and seroquel at this time) with minimal to no improvement in depressive sxs since then. Patient then suffered MI in May of this year and his depression has worsened since then. States he has been experiencing no motivation, poor sleep (4-5 hours only), low energy, anhedonia, decreased concentration, psychomotor retardation. Intermittent thoughts of wanting to die, occasional SI but  denies intention, method, planning. Denies issues with appetite. Identifies family as protective factors.    Also endorses constant worry and rumination. Lots of difficulty with making decisions. Denies panic attacks.  Denies manic symptoms. Denies psychotic sxs.   Of note, patient and wife are interested in adjusting his medications as they feel current regimen  is not working. Discuss keeping medications the same for right now but will discuss further with outpatient psychiatrist.     PPHX: No history of hospitalizations. Outpatient tx as above. No hx of SIB or SA.  Patient denies of manic episodes but does describe history of hypomanic episodes.   By history, patient reports that prior to his first depressive episode in 2002, he had felt more than usual amounts of energy, was able to take on multiple tasks at once and felt his mood was very happy / elevated. It normalized after the first depressive episode in 2002 while on Paxil. THen in 2016 in [facility], patient experienced another similar episode in which he was not sleeping, had increased energy, began obsessing over "population research" It lasted for a few months until he immediately went into a depressive episode. No     SxHx:  x 40 years. Worked as  prior to half-way. No children    Substance: Patient denies tobacco smoking, marijuana, alcohol and illicit drug use    Famhx: Patient's mother had schizophrenia, required ECT.  One brother nad nephew also with schizophrenia. Another brother with depression    Collateral form wife: affirms psychiatric history as above. Affirms refractory depression/anxiety at this time. Denies acute safety concerns for patient.

## 2023-01-09 NOTE — BH CONSULTATION LIAISON ASSESSMENT NOTE - NSBHCONSULTFOLLOWAFTERCARE_PSY_A_CORE FT
Patient can follow up with his outpatient psychiatrist Dr. Yu. Also provided patient and his wife with information for [facility] ([phone]) if patient desires alternate follow up.  Patient can follow up with his outpatient psychiatrist Dr. Yu.    [facility] ([phone]) if patient desires alternate follow up.

## 2023-01-09 NOTE — PROGRESS NOTE ADULT - PROBLEM SELECTOR PLAN 2
CTA (1/3) showed moderate loculated R pleural effusion, slightly increased from prior. Given hx of lung adenocarcinoma and larger HARRY pulmonary nodule, suspect malignant effusion vs. less likely infectious given patient is afebrile without a WBC count, and non-toxic appearing   - pulm consulted for diagnostic +/- therapeutic thoracentesis and biopsy of pulmonary nodule; appreciate recs- tentatively planned for Tuesday 1/10  - c/w PO Lasix 40mg qd for continued diuresis  - holding Plavix 75mg qd (safe to do so from cardiology perspective)  - monitor off abx for now as pt. is afebrile and without leukocytosis CTA (1/3) showed moderate loculated R pleural effusion, slightly increased from prior. Given hx of lung adenocarcinoma and larger HARRY pulmonary nodule, suspect malignant effusion vs. less likely infectious given patient is afebrile without a WBC count, and non-toxic appearing   - pulm consulted for thoracentesis +/- biopsy of HARRY pulmonary nodule; tentatively planned for today 1/9- if fluid studies negative, will likely pursue biopsy of HARRY pulmonary nodule  - c/w PO Lasix 40mg qd for continued diuresis  - holding Plavix 75mg qd (safe to do so from cardiology perspective); restart after thoracentesis  - monitor off abx for now as pt. is afebrile and without leukocytosis

## 2023-01-09 NOTE — PROGRESS NOTE ADULT - PROBLEM SELECTOR PLAN 1
Pt. p/w 2 weeks worsening dyspnea on exertion, PND and orthopnea x 2 weeks. Pro-BNP 5600 on admission w/ CTA showing moderate loculated R pleural effusion and pt. w/ +entero/rhinovirus on RVP. Presentation most likely 2/2 acute on chronic CHF exacerbation i/s/o entero/rhinovirus  - TTE (11/2022): EF 22% w/ severe LV systolic dysfunction, decreased RV systolic function, and severe pHTN  - TTE (1/4/2023): EF 27% w/ eccentric LVH, severe LVSD, akinesis of apical, septal, inferior walls and hypokinesis of remaining walls, severe LAE w/ dec RVSF  - HF following; appreciate recs  - c/w PO Lasix 40mg qd for continued diuresis  - c/w Aldactone 12.5mg qd  - c/w metoprolol succinate 50mg qd  - c/w Entresto 24-26mg qd  - holding Farxiga for now  - strict I/Os, daily standing weights  - monitor electrolytes and replete for K>4 and Mg>2 Pt. p/w 2 weeks worsening dyspnea on exertion, PND and orthopnea x 2 weeks. Pro-BNP 5600 on admission w/ CTA showing moderate loculated R pleural effusion and pt. w/ +entero/rhinovirus on RVP. Presentation most likely 2/2 acute on chronic CHF exacerbation i/s/o entero/rhinovirus  - TTE (11/2022): EF 22% w/ severe LV systolic dysfunction, decreased RV systolic function, and severe pHTN  - TTE (1/4/2023): EF 27% w/ eccentric LVH, severe LVSD, akinesis of apical, septal, inferior walls and hypokinesis of remaining walls, severe LAE w/ dec RVSF  - HF following; appreciate recs  - c/w PO Lasix 40mg qd for continued diuresis  - c/w Aldactone 12.5mg qd  - c/w metoprolol succinate 50mg qd  - c/w Entresto 24-26mg qd  - start Farxiga 10mg qd  - strict I/Os, daily standing weights  - monitor electrolytes and replete for K>4 and Mg>2

## 2023-01-09 NOTE — BH CONSULTATION LIAISON ASSESSMENT NOTE - SUMMARY
Patient is a 77 year-old man who is , domiciled with his wife, with no children,   PPHx of likely bipolar 2,  depression and anxiety, with PMHx of MI in May 2022, CHF, HLD and BPH, denying history of psychiatric admissions/suicide attempts, in outpatient psychiatric/therapy tx w/ Dr. Mcgee [phone] admitted to [facility] for HF 2/2 virus.    Psychiatry consulted for depression.    Patient is a 77 year-old man who is , domiciled with his wife, with no children,   PPHx of likely bipolar 2,  depression and anxiety, with PMHx of MI in May 2022, CHF, HLD and BPH, denying history of psychiatric admissions/suicide attempts, in outpatient psychiatric/therapy tx w/ Dr. Mcgee [phone] admitted to [facility] for HF 2/2 virus.    Psychiatry consulted for depression.     Chart reviewed. Patient currently on Effexor xr 150mg Qday , mirtazapine 30 QHS (increased 1-2 weeks ago) and ambien 5mg QHS PRN (started 1 week ago but only began while in hospital at [facility]).     Patient seen and examined. Alert/cooperative/oriented, depressed and anxious-appearing. He and wife share that patient's issues with mental health began  around  when patient had been working as an . He was placed on paxil and stabilized for ~ 15 years until changing psychiatrists. At  that time, his new psychiatrist first tapered paxil and depressive sxs returned. Pt trialed on multiple medications since then including antipsychotics (only remembers abilfiy and seroquel at this time) with minimal to no improvement in depressive sxs since then. Patient then suffered MI in May of this year and his depression has worsened since then. States he has been experiencing no motivation, poor sleep (4-5 hours only), low energy, anhedonia, decreased concentration, psychomotor retardation. Intermittent thoughts of wanting to die, occasional SI but  denies intention, method, planning. Denies issues with appetite. Identifies family as protective factors.    Also endorses constant worry and rumination. Lots of difficulty with making decisions. Denies panic attacks.  Denies manic symptoms. Denies psychotic sxs.   Of note, patient and wife are interested in adjusting his medications as they feel current regimen  is not working. Discuss keeping medications the same for right now but will discuss further with outpatient psychiatrist. He also reports worry over attending his brother's  planned for this week but states he will have support in helping him get through and process it.    Patient currently endorsing several sxs of depression and anxiety and reports hx of hypomanic sxs. Given current clinical picture, chronicity of unremitting depressive sxs not improving w/ antidepressants. pt likely suffers from bipolar 2 and, currently in depressive episode exacerbate by recent MI in May 2022. Endorses several failed medication trials of antidepressants, some trials of antipsychotics but does not know which ones. Described interventional methods with pt e.g. ECT. He wants to think about options with wife. Decided to keep medication regimen the same for now while in hospital until writer is able to contact psychiatrist to discuss previous medication trials.

## 2023-01-09 NOTE — BH CONSULTATION LIAISON ASSESSMENT NOTE - RISK ASSESSMENT
Patient is at low acute and elevated chronic risk of suicide. Acute risk factors include mood episode, anxiety with panic, acute medical problems, and recent passive suicidal ideation. Patient denies active suicidal ideation/intent/plan. No history of suicide attempts, psychiatric hospitalizations, or substance abuse problems. No evidence of acute baldemar or substance intoxication. Chronic risk factors include age, male gender, and history of mood disorder/anxiety. Protective factors include strong relationship with wife, stable domicile, sobriety, future-oriented thinking, and strong engagement with current psychiatric outpatient treatment. Patient does not meet criteria for involuntary inpatient psychiatric hospitalization as he is not at imminent risk of harm. His risk is expected to be further mitigated by continued engagement in outpatient treatment for medication management; is also considering therapy/group.  Patient is at low acute and elevated chronic risk of suicide. Acute risk factors include mood episode, anxiety, acute medical problems, and recent passive suicidal ideation. Patient denies active suicidal ideation/intent/plan. No history of suicide attempts, psychiatric hospitalizations, or substance abuse problems. No evidence of acute baldemar or substance intoxication. Chronic risk factors include age, male gender, and history of mood disorder/anxiety. Protective factors include strong relationship with wife, stable domicile, sobriety, future-oriented thinking, and strong engagement with current psychiatric outpatient treatment. Patient does not meet criteria for involuntary inpatient psychiatric hospitalization as he is not at imminent risk of harm. His risk is expected to be further mitigated by continued engagement in outpatient treatment for medication management; is also considering therapy/group.

## 2023-01-09 NOTE — PROGRESS NOTE ADULT - PROBLEM SELECTOR PLAN 7
- c/w home Effexor 150mg qd  - c/w Mirtazapine 30mg qd Hx of depression; symptoms noting to be worsening this admission after recent passing of pt's brother. No SI/HI  - psych consulted; f/u recs  - c/w home Effexor 150mg qd  - c/w Mirtazapine 30mg qd

## 2023-01-09 NOTE — PROGRESS NOTE ADULT - PROBLEM SELECTOR PLAN 4
Hx of NSTEMI in 5/2022 s/p cardiac cath showing RCA  and significant LAD and diagonal lesions w/ LILLIANA 3 flow. EKG nonischemic and trops in ED unremarkable (52 --> 45)  - TTE (11/2022): EF 22% w/ severe LV systolic dysfunction, decreased RV systolic function, and severe pHTN  - TTE (1/4/2023): EF 27% w/ eccentric LVH, severe LVSD, akinesis of apical, septal, inferior walls and hypokinesis of remaining walls, severe LAE w/ dec RVSF  - c/w ASA 81mg qd  - holding Plavix 75mg qd (safe to do so from cardiology perspective)  - c/w rosuvastatin 20mg qhs (patient cannot tolerate atorvastatin)

## 2023-01-09 NOTE — BH CONSULTATION LIAISON ASSESSMENT NOTE - NSBHCONSULTRECOMMENDOTHER_PSY_A_CORE FT
Safety: no indication for psychiatric CO  labs: Can check TSH, B12, folate, Vit, Mg, Phos, UA  EKG to monitor qtc, if > 500 HOLD antipsychotics  Medications: C/w venlafaxine xr 150mg Qday, mirtazapine 30mg po QHS, ambien 5mg po QHS PRN insomnia  Anxiety: Can use ativan 0.5 Q8H, PRN; HOLD for sedation, monitor for respiratory depression  Dispo: Patient not meeting criteria for involuntary hospitalization, can be followed up on an outpatient basis

## 2023-01-09 NOTE — PROGRESS NOTE ADULT - ASSESSMENT
77 year old male with PMH significant for CAD with a silent MI this past May, CHF, lung nodule s/p partial right lobectomy c/b cardiogenic shock, and depression who presented due to worsening shortness of breath with exertion most likely related to acute decompensated HF 2/2 to entero/rhinovirus. Pulmonology consulted for right-sided pleural effusion and enlarging HARRY lung nodule.     Recommendations:  - suspect dyspnea on exertion is secondary to CHF exacerbation with viral illness  - heart failure following, agree with aggressive diuresis given volume overload on exam  - rapid growth of HARRY lung nodule is concerning for malignancy vs less likely infectious   - Plan for thoracentesis today or tomorrow   - Discussed risk and benefits with the patient   - check Coags   - Contacted  from CT surgery as patient's previous surgery was done by him     Shahram Reza MD   Pulmonary/Critical Care Fellow PGY-7  [facility] Pager #: [phone]  Spectra #: [id number]

## 2023-01-09 NOTE — PROCEDURE NOTE - ADDITIONAL PROCEDURE DETAILS
Thoracentesis performed with US guidance. Lung sliding confirmed prior to and after the procedure. Pleural fluid sent for further testing. CXR ordered post procedure for further evaluation. Patient tolerated procedure well with no complications.       Shahram Reza MD   Pulmonary/Critical Care Fellow PGY-7  [facility] Pager #: [phone]  [facility] Pager #: [phone]

## 2023-01-09 NOTE — PROGRESS NOTE ADULT - PROBLEM SELECTOR PLAN 3
SCr on admission 1.77, baseline ~0.8; likely i/s/o cardiorenal syndrome  - urine urea 350, UCr 28, FEUrea 40.4%  - c/w PO Lasix 40mg qd for continued diuresis  - avoid nephrotoxic meds and renally dose medications   - monitor creatinine daily SCr on admission 1.77, baseline ~0.8; likely i/s/o cardiorenal syndrome; resolved  - urine urea 350, UCr 28, FEUrea 40.4%  - c/w PO Lasix 40mg qd for continued diuresis  - avoid nephrotoxic meds and renally dose medications   - monitor creatinine daily

## 2023-01-09 NOTE — PROGRESS NOTE ADULT - SUBJECTIVE AND OBJECTIVE BOX
*******************************  Pamella Brambila MD (PGY-1)  Internal Medicine  Contact via [employer] TEAMS  [facility] Pager: [phone]  [facility] Pager: [phone]  *******************************    PROGRESS NOTE:     Patient is a 77y old  Male who presents with a chief complaint of shortness of breath (08 Jan 2023 08:38)    INTERVAL EVENTS: No acute overnight events.     SUBJECTIVE: Patient seen and examined at bedside. This morning, the patient is comfortable and doing well. No acute complaints. Denies fevers, chills, N/V/D, chest pain, SOB, abdominal pain.    MEDICATIONS  (STANDING):  aspirin  chewable 81 milliGRAM(s) Oral daily  furosemide    Tablet 40 milliGRAM(s) Oral <User Schedule>  latanoprost 0.005% Ophthalmic Solution 1 Drop(s) Both EYES at bedtime  metoprolol succinate ER 25 milliGRAM(s) Oral two times a day  mirtazapine 30 milliGRAM(s) Oral daily  polyethylene glycol 3350 17 Gram(s) Oral daily  sacubitril 24 mG/valsartan 26 mG 1 Tablet(s) Oral two times a day  spironolactone 12.5 milliGRAM(s) Oral daily  tamsulosin 0.4 milliGRAM(s) Oral at bedtime  venlafaxine XR. 150 milliGRAM(s) Oral daily    MEDICATIONS  (PRN):  psyllium Powder 1 Packet(s) Oral three times a day PRN constipation  zolpidem 5 milliGRAM(s) Oral at bedtime PRN Insomnia    CAPILLARY BLOOD GLUCOSE    I&O's Summary    08 Jan 2023 07:01  -  09 Jan 2023 07:00  --------------------------------------------------------  IN: 1010 mL / OUT: 2750 mL / NET: -1740 mL    PHYSICAL EXAM:  Vital Signs Last 24 Hrs  T(C): 36.8 (08 Jan 2023 21:48), Max: 36.8 (08 Jan 2023 18:18)  T(F): 98.2 (08 Jan 2023 21:48), Max: 98.2 (08 Jan 2023 18:18)  HR: 68 (08 Jan 2023 21:48) (68 - 77)  BP: 116/69 (08 Jan 2023 21:48) (91/59 - 116/69)  BP(mean): --  RR: 18 (08 Jan 2023 21:48) (18 - 18)  SpO2: 98% (08 Jan 2023 21:48) (96% - 98%)    Parameters below as of 08 Jan 2023 21:48  Patient On (Oxygen Delivery Method): room air    GENERAL: NAD, lying in bed comfortably  NECK: +JVP up to mid neck  HEART: S1, S2, Regular rate and rhythm, no murmurs, rubs, or gallops  LUNGS: decreased breath sounds at base of right lung. Unlabored respirations, no crackles, wheezing, or rhonchi  ABDOMEN: Soft, nontender, nondistended, +BS  EXTREMITIES: 2+ peripheral pulses bilaterally. 1-2+ LE edema, improving  NERVOUS SYSTEM:  A&Ox3, no focal deficits   SKIN: No rashes or lesions    LABS:                        12.2   5.96  )-----------( 204      ( 08 Jan 2023 07:33 )             39.1     01-08    136  |  102  |  32<H>  ----------------------------<  97  4.3   |  22  |  1.12    Ca    9.4      08 Jan 2023 07:31  Phos  3.6     01-08  Mg     2.4     01-08    RADIOLOGY & ADDITIONAL TESTS:  Results Reviewed:   Imaging Personally Reviewed:  Electrocardiogram Personally Reviewed:  Tele:    COORDINATION OF CARE:  Care Discussed with Consultants/Other Providers [Y/N]:  Prior or Outpatient Records Reviewed [Y/N]:   *******************************  Pamella Brambila MD (PGY-1)  Internal Medicine  Contact via [employer] TEAMS  [facility] Pager: [phone]  [facility] Pager: [phone]  *******************************    PROGRESS NOTE:     Patient is a 77y old  Male who presents with a chief complaint of shortness of breath (08 Jan 2023 08:38)    INTERVAL EVENTS: No acute overnight events.     SUBJECTIVE: Patient seen and examined at bedside. This morning, the patient is comfortable and doing well. No acute complaints. Denies fevers, chills, N/V/D, chest pain, SOB, abdominal pain.    MEDICATIONS  (STANDING):  aspirin  chewable 81 milliGRAM(s) Oral daily  furosemide    Tablet 40 milliGRAM(s) Oral <User Schedule>  latanoprost 0.005% Ophthalmic Solution 1 Drop(s) Both EYES at bedtime  metoprolol succinate ER 25 milliGRAM(s) Oral two times a day  mirtazapine 30 milliGRAM(s) Oral daily  polyethylene glycol 3350 17 Gram(s) Oral daily  sacubitril 24 mG/valsartan 26 mG 1 Tablet(s) Oral two times a day  spironolactone 12.5 milliGRAM(s) Oral daily  tamsulosin 0.4 milliGRAM(s) Oral at bedtime  venlafaxine XR. 150 milliGRAM(s) Oral daily    MEDICATIONS  (PRN):  psyllium Powder 1 Packet(s) Oral three times a day PRN constipation  zolpidem 5 milliGRAM(s) Oral at bedtime PRN Insomnia    CAPILLARY BLOOD GLUCOSE    I&O's Summary    08 Jan 2023 07:01  -  09 Jan 2023 07:00  --------------------------------------------------------  IN: 1010 mL / OUT: 2750 mL / NET: -1740 mL    PHYSICAL EXAM:  Vital Signs Last 24 Hrs  T(C): 36.8 (08 Jan 2023 21:48), Max: 36.8 (08 Jan 2023 18:18)  T(F): 98.2 (08 Jan 2023 21:48), Max: 98.2 (08 Jan 2023 18:18)  HR: 68 (08 Jan 2023 21:48) (68 - 77)  BP: 116/69 (08 Jan 2023 21:48) (91/59 - 116/69)  BP(mean): --  RR: 18 (08 Jan 2023 21:48) (18 - 18)  SpO2: 98% (08 Jan 2023 21:48) (96% - 98%)    Parameters below as of 08 Jan 2023 21:48  Patient On (Oxygen Delivery Method): room air    GENERAL: NAD, lying in bed comfortably  NECK: +JVP up to mid neck  HEART: S1, S2, Regular rate and rhythm, no murmurs, rubs, or gallops  LUNGS: decreased breath sounds at base of right lung. Unlabored respirations, no crackles, wheezing, or rhonchi  ABDOMEN: Soft, nontender, nondistended, +BS  EXTREMITIES: 2+ peripheral pulses bilaterally. 1-2+ LE edema, improving  NERVOUS SYSTEM:  A&Ox3, no focal deficits   SKIN: No rashes or lesions    LABS:                         11.4   5.60  )-----------( 186      ( 09 Jan 2023 08:27 )             35.9     01-09    137  |  105  |  28<H>  ----------------------------<  98  4.4   |  23  |  1.15    Ca    9.0      09 Jan 2023 08:27  Phos  3.0     01-09  Mg     2.3     01-09    RADIOLOGY & ADDITIONAL TESTS:  Results Reviewed:   Imaging Personally Reviewed:  Electrocardiogram Personally Reviewed:  Tele: NSR 60s-90s, PVCs    COORDINATION OF CARE:  Care Discussed with Consultants/Other Providers [Y/N]:  Prior or Outpatient Records Reviewed [Y/N]:

## 2023-01-09 NOTE — PROGRESS NOTE ADULT - SUBJECTIVE AND OBJECTIVE BOX
CHIEF COMPLAINT:Patient is a 77y old  Male who presents with a chief complaint of shortness of breath (09 Jan 2023 07:08)      Interval Events: No issues overnight. Respiratory symptoms have been stable. Discussed thoracentesis risk and benefits with the patient and his wife.     REVIEW OF SYSTEMS:  [x] All other systems negative except per HPI   [ ] Unable to assess ROS because ________    OBJECTIVE:  ICU Vital Signs Last 24 Hrs  T(C): 36.7 (09 Jan 2023 09:24), Max: 36.8 (08 Jan 2023 18:18)  T(F): 98.1 (09 Jan 2023 09:24), Max: 98.2 (08 Jan 2023 18:18)  HR: 87 (09 Jan 2023 09:24) (68 - 87)  BP: 95/64 (09 Jan 2023 09:24) (91/59 - 116/69)  BP(mean): --  ABP: --  ABP(mean): --  RR: 16 (09 Jan 2023 09:24) (16 - 18)  SpO2: 95% (09 Jan 2023 09:24) (95% - 98%)    O2 Parameters below as of 09 Jan 2023 09:24  Patient On (Oxygen Delivery Method): room air              01-08 @ 07:01 - 01-09 @ 07:00  --------------------------------------------------------  IN: 1010 mL / OUT: 2750 mL / NET: -1740 mL    01-09 @ 07:01  -  01-09 @ 12:04  --------------------------------------------------------  IN: 240 mL / OUT: 0 mL / NET: 240 mL        PHYSICAL EXAM:  GENERAL: NAD, well-groomed, well-developed  EYES: EOMI, PERRLA, conjunctiva and sclera clear  ENMT: No tonsillar erythema, exudates, or enlargement; Moist mucous membranes, Good dentition, No lesions  CHEST/LUNG: Decreased BS at the right base   HEART: Regular rate and rhythm; No murmurs, rubs, or gallops  ABDOMEN: Soft, Nontender, Nondistended; Bowel sounds present  VASCULAR:  2+ Peripheral Pulses, No clubbing, cyanosis, or edema  LYMPH: No lymphadenopathy noted  SKIN: No rashes or lesions  NERVOUS SYSTEM:  Alert & Oriented X3, Good concentration; Motor Strength 5/5 B/L upper and lower extremities;    HOSPITAL MEDICATIONS:  MEDICATIONS  (STANDING):  aspirin  chewable 81 milliGRAM(s) Oral daily  furosemide    Tablet 40 milliGRAM(s) Oral <User Schedule>  latanoprost 0.005% Ophthalmic Solution 1 Drop(s) Both EYES at bedtime  metoprolol succinate ER 25 milliGRAM(s) Oral two times a day  mirtazapine 30 milliGRAM(s) Oral daily  polyethylene glycol 3350 17 Gram(s) Oral daily  sacubitril 24 mG/valsartan 26 mG 1 Tablet(s) Oral two times a day  spironolactone 12.5 milliGRAM(s) Oral daily  tamsulosin 0.4 milliGRAM(s) Oral at bedtime  venlafaxine XR. 150 milliGRAM(s) Oral daily    MEDICATIONS  (PRN):  psyllium Powder 1 Packet(s) Oral three times a day PRN constipation  zolpidem 5 milliGRAM(s) Oral at bedtime PRN Insomnia      LABS:    The Labs were reviewed by me   The Radiology was reviewed by me    EKG tracing reviewed by me    01-09    137  |  105  |  28<H>  ----------------------------<  98  4.4   |  23  |  1.15  01-08    136  |  102  |  32<H>  ----------------------------<  97  4.3   |  22  |  1.12  01-07    137  |  102  |  34<H>  ----------------------------<  91  3.6   |  24  |  1.18    Ca    9.0      09 Jan 2023 08:27  Ca    9.4      08 Jan 2023 07:31  Ca    9.1      07 Jan 2023 07:18  Phos  3.0     01-09  Mg     2.3     01-09      Magnesium, Serum: 2.3 mg/dL (01-09-23 @ 08:27)  Magnesium, Serum: 2.4 mg/dL (01-08-23 @ 07:31)  Magnesium, Serum: 2.1 mg/dL (01-07-23 @ 07:18)    Phosphorus Level, Serum: 3.0 mg/dL (01-09-23 @ 08:27)  Phosphorus Level, Serum: 3.6 mg/dL (01-08-23 @ 07:31)  Phosphorus Level, Serum: 3.3 mg/dL (01-07-23 @ 07:18)                                              11.4   5.60  )-----------( 186      ( 09 Jan 2023 08:27 )             35.9                         12.2   5.96  )-----------( 204      ( 08 Jan 2023 07:33 )             39.1                         11.9   5.35  )-----------( 184      ( 07 Jan 2023 07:19 )             37.0     CAPILLARY BLOOD GLUCOSE            MICROBIOLOGY:     RADIOLOGY:  [ ] Reviewed and interpreted by me    < from: CT Angio Chest PE Protocol w/ IV Cont (01.03.23 @ 18:29) >  IMPRESSION:  No pulmonary embolism.    Mildly increased moderateloculated right pleural effusion since   12/27/2022. Status post post right upper lobectomy.    Indeterminate 1.7 cm apicoposterior left upper lobe nodule, enlarged   since July 2022 (4 mm). Consider follow-up chest CT in 3 months, PET/CT   and/or tissue sampling for further evaluation.    < end of copied text >      Point of Care Ultrasound Findings:    PFT:    EKG:

## 2023-01-09 NOTE — BH CONSULTATION LIAISON ASSESSMENT NOTE - CURRENT MEDICATION
MEDICATIONS  (STANDING):  aspirin  chewable 81 milliGRAM(s) Oral daily  furosemide    Tablet 40 milliGRAM(s) Oral <User Schedule>  latanoprost 0.005% Ophthalmic Solution 1 Drop(s) Both EYES at bedtime  metoprolol succinate ER 25 milliGRAM(s) Oral two times a day  mirtazapine 30 milliGRAM(s) Oral daily  polyethylene glycol 3350 17 Gram(s) Oral daily  sacubitril 24 mG/valsartan 26 mG 1 Tablet(s) Oral two times a day  spironolactone 12.5 milliGRAM(s) Oral daily  tamsulosin 0.4 milliGRAM(s) Oral at bedtime  venlafaxine XR. 150 milliGRAM(s) Oral daily    MEDICATIONS  (PRN):  psyllium Powder 1 Packet(s) Oral three times a day PRN constipation  zolpidem 5 milliGRAM(s) Oral at bedtime PRN Insomnia

## 2023-01-09 NOTE — BH CONSULTATION LIAISON ASSESSMENT NOTE - NSBHCHARTREVIEWVS_PSY_A_CORE FT
Vital Signs Last 24 Hrs  T(C): 36.6 (09 Jan 2023 13:30), Max: 36.8 (08 Jan 2023 18:18)  T(F): 97.9 (09 Jan 2023 13:30), Max: 98.2 (08 Jan 2023 18:18)  HR: 71 (09 Jan 2023 13:30) (68 - 87)  BP: 104/64 (09 Jan 2023 13:30) (91/59 - 116/69)  BP(mean): --  RR: 18 (09 Jan 2023 13:30) (16 - 18)  SpO2: 97% (09 Jan 2023 13:30) (95% - 98%)    Parameters below as of 09 Jan 2023 13:30  Patient On (Oxygen Delivery Method): room air

## 2023-01-09 NOTE — BH CONSULTATION LIAISON ASSESSMENT NOTE - NSICDXBHSECONDARYDX_PSY_ALL_CORE
CAD (coronary artery disease)   I25.10  Unipolar depression   F33.9  Prophylactic measure   Z29.9  BRODY (acute kidney injury)   N17.9  Pleural effusion   J90  History of BPH   Z87.438  Hypothyroidism   E03.9  Acute on chronic HFrEF (heart failure with reduced ejection fraction)   I50.23  Lung nodule   R91.1  Rhinovirus   B34.8  Severe mitral regurgitation   I34.0  Insomnia   G47.00

## 2023-01-09 NOTE — BH CONSULTATION LIAISON ASSESSMENT NOTE - NSBHCHARTREVIEWINVESTIGATE_PSY_A_CORE FT
Ventricular Rate 82 BPM    Atrial Rate 82 BPM    P-R Interval 184 ms    QRS Duration 90 ms    Q-T Interval 410 ms    QTC Calculation(Bazett) 479 ms    P Axis 91 degrees    R Axis -88 degrees    T Axis 133 degrees    Diagnosis Line SINUS RHYTHM WITH OCCASIONAL PREMATURE VENTRICULAR COMPLEXES  LEFT ANTERIOR FASCICULAR BLOCK  ANTEROLATERAL INFARCT (CITED ON OR BEFORE 12-MAY-2022)  ABNORMAL ECG  WHEN COMPARED WITH ECG OF 12-MAY-2022 19:42, (UNCONFIRMED)  NO SIGNIFICANT CHANGE WAS FOUND  Confirmed by TAYE COURTNEY MD ([id number]) on 5/13/2022 1:28:12 PM    Reference #: [id number]

## 2023-01-10 ENCOUNTER — TRANSCRIPTION ENCOUNTER (OUTPATIENT)
Age: 78
End: 2023-01-10

## 2023-01-10 VITALS
HEART RATE: 70 BPM | RESPIRATION RATE: 18 BRPM | SYSTOLIC BLOOD PRESSURE: 110 MMHG | TEMPERATURE: 98 F | DIASTOLIC BLOOD PRESSURE: 62 MMHG | OXYGEN SATURATION: 97 %

## 2023-01-10 LAB
ANION GAP SERPL CALC-SCNC: 9 MMOL/L — SIGNIFICANT CHANGE UP (ref 5–17)
BUN SERPL-MCNC: 26 MG/DL — HIGH (ref 7–23)
CALCIUM SERPL-MCNC: 9.1 MG/DL — SIGNIFICANT CHANGE UP (ref 8.4–10.5)
CHLORIDE SERPL-SCNC: 103 MMOL/L — SIGNIFICANT CHANGE UP (ref 96–108)
CO2 SERPL-SCNC: 26 MMOL/L — SIGNIFICANT CHANGE UP (ref 22–31)
CREAT SERPL-MCNC: 1.21 MG/DL — SIGNIFICANT CHANGE UP (ref 0.5–1.3)
EGFR: 62 ML/MIN/1.73M2 — SIGNIFICANT CHANGE UP
GLUCOSE SERPL-MCNC: 97 MG/DL — SIGNIFICANT CHANGE UP (ref 70–99)
HCT VFR BLD CALC: 36.1 % — LOW (ref 39–50)
HGB BLD-MCNC: 11.3 G/DL — LOW (ref 13–17)
MAGNESIUM SERPL-MCNC: 2.3 MG/DL — SIGNIFICANT CHANGE UP (ref 1.6–2.6)
MCHC RBC-ENTMCNC: 28.8 PG — SIGNIFICANT CHANGE UP (ref 27–34)
MCHC RBC-ENTMCNC: 31.3 GM/DL — LOW (ref 32–36)
MCV RBC AUTO: 92.1 FL — SIGNIFICANT CHANGE UP (ref 80–100)
NRBC # BLD: 0 /100 WBCS — SIGNIFICANT CHANGE UP (ref 0–0)
PHOSPHATE SERPL-MCNC: 3.3 MG/DL — SIGNIFICANT CHANGE UP (ref 2.5–4.5)
PLATELET # BLD AUTO: 178 K/UL — SIGNIFICANT CHANGE UP (ref 150–400)
POTASSIUM SERPL-MCNC: 4.5 MMOL/L — SIGNIFICANT CHANGE UP (ref 3.5–5.3)
POTASSIUM SERPL-SCNC: 4.5 MMOL/L — SIGNIFICANT CHANGE UP (ref 3.5–5.3)
RBC # BLD: 3.92 M/UL — LOW (ref 4.2–5.8)
RBC # FLD: 17.1 % — HIGH (ref 10.3–14.5)
SODIUM SERPL-SCNC: 138 MMOL/L — SIGNIFICANT CHANGE UP (ref 135–145)
WBC # BLD: 6.36 K/UL — SIGNIFICANT CHANGE UP (ref 3.8–10.5)
WBC # FLD AUTO: 6.36 K/UL — SIGNIFICANT CHANGE UP (ref 3.8–10.5)

## 2023-01-10 PROCEDURE — 83690 ASSAY OF LIPASE: CPT

## 2023-01-10 PROCEDURE — 99239 HOSP IP/OBS DSCHRG MGMT >30: CPT

## 2023-01-10 PROCEDURE — 0225U NFCT DS DNA&RNA 21 SARSCOV2: CPT

## 2023-01-10 PROCEDURE — 83036 HEMOGLOBIN GLYCOSYLATED A1C: CPT

## 2023-01-10 PROCEDURE — 87070 CULTURE OTHR SPECIMN AEROBIC: CPT

## 2023-01-10 PROCEDURE — 83880 ASSAY OF NATRIURETIC PEPTIDE: CPT

## 2023-01-10 PROCEDURE — 82570 ASSAY OF URINE CREATININE: CPT

## 2023-01-10 PROCEDURE — 87086 URINE CULTURE/COLONY COUNT: CPT

## 2023-01-10 PROCEDURE — 36415 COLL VENOUS BLD VENIPUNCTURE: CPT

## 2023-01-10 PROCEDURE — 82947 ASSAY GLUCOSE BLOOD QUANT: CPT

## 2023-01-10 PROCEDURE — 83605 ASSAY OF LACTIC ACID: CPT

## 2023-01-10 PROCEDURE — 87075 CULTR BACTERIA EXCEPT BLOOD: CPT

## 2023-01-10 PROCEDURE — 85610 PROTHROMBIN TIME: CPT

## 2023-01-10 PROCEDURE — 84295 ASSAY OF SERUM SODIUM: CPT

## 2023-01-10 PROCEDURE — 84300 ASSAY OF URINE SODIUM: CPT

## 2023-01-10 PROCEDURE — 83615 LACTATE (LD) (LDH) ENZYME: CPT

## 2023-01-10 PROCEDURE — 84100 ASSAY OF PHOSPHORUS: CPT

## 2023-01-10 PROCEDURE — 84436 ASSAY OF TOTAL THYROXINE: CPT

## 2023-01-10 PROCEDURE — 99285 EMERGENCY DEPT VISIT HI MDM: CPT | Mod: 25

## 2023-01-10 PROCEDURE — 88112 CYTOPATH CELL ENHANCE TECH: CPT

## 2023-01-10 PROCEDURE — 84132 ASSAY OF SERUM POTASSIUM: CPT

## 2023-01-10 PROCEDURE — 80053 COMPREHEN METABOLIC PANEL: CPT

## 2023-01-10 PROCEDURE — 71275 CT ANGIOGRAPHY CHEST: CPT | Mod: QQ

## 2023-01-10 PROCEDURE — 85014 HEMATOCRIT: CPT

## 2023-01-10 PROCEDURE — 82330 ASSAY OF CALCIUM: CPT

## 2023-01-10 PROCEDURE — 82945 GLUCOSE OTHER FLUID: CPT

## 2023-01-10 PROCEDURE — 87102 FUNGUS ISOLATION CULTURE: CPT

## 2023-01-10 PROCEDURE — 82042 OTHER SOURCE ALBUMIN QUAN EA: CPT

## 2023-01-10 PROCEDURE — 83986 ASSAY PH BODY FLUID NOS: CPT

## 2023-01-10 PROCEDURE — 84443 ASSAY THYROID STIM HORMONE: CPT

## 2023-01-10 PROCEDURE — 88305 TISSUE EXAM BY PATHOLOGIST: CPT

## 2023-01-10 PROCEDURE — 82435 ASSAY OF BLOOD CHLORIDE: CPT

## 2023-01-10 PROCEDURE — 87205 SMEAR GRAM STAIN: CPT

## 2023-01-10 PROCEDURE — 85027 COMPLETE CBC AUTOMATED: CPT

## 2023-01-10 PROCEDURE — 80048 BASIC METABOLIC PNL TOTAL CA: CPT

## 2023-01-10 PROCEDURE — 81001 URINALYSIS AUTO W/SCOPE: CPT

## 2023-01-10 PROCEDURE — 71045 X-RAY EXAM CHEST 1 VIEW: CPT

## 2023-01-10 PROCEDURE — 89051 BODY FLUID CELL COUNT: CPT

## 2023-01-10 PROCEDURE — 84484 ASSAY OF TROPONIN QUANT: CPT

## 2023-01-10 PROCEDURE — 84157 ASSAY OF PROTEIN OTHER: CPT

## 2023-01-10 PROCEDURE — 85025 COMPLETE CBC W/AUTO DIFF WBC: CPT

## 2023-01-10 PROCEDURE — 93306 TTE W/DOPPLER COMPLETE: CPT

## 2023-01-10 PROCEDURE — 93308 TTE F-UP OR LMTD: CPT

## 2023-01-10 PROCEDURE — 85730 THROMBOPLASTIN TIME PARTIAL: CPT

## 2023-01-10 PROCEDURE — 84540 ASSAY OF URINE/UREA-N: CPT

## 2023-01-10 PROCEDURE — 99233 SBSQ HOSP IP/OBS HIGH 50: CPT | Mod: GC

## 2023-01-10 PROCEDURE — 82803 BLOOD GASES ANY COMBINATION: CPT

## 2023-01-10 PROCEDURE — 85018 HEMOGLOBIN: CPT

## 2023-01-10 PROCEDURE — 83735 ASSAY OF MAGNESIUM: CPT

## 2023-01-10 RX ORDER — ROSUVASTATIN CALCIUM 5 MG/1
1 TABLET ORAL
Qty: 30 | Refills: 0
Start: 2023-01-10 | End: 2023-02-08

## 2023-01-10 RX ORDER — LOSARTAN POTASSIUM 100 MG/1
1 TABLET, FILM COATED ORAL
Qty: 0 | Refills: 0 | DISCHARGE

## 2023-01-10 RX ORDER — SPIRONOLACTONE 25 MG/1
0.5 TABLET, FILM COATED ORAL
Qty: 15 | Refills: 0
Start: 2023-01-10 | End: 2023-02-08

## 2023-01-10 RX ORDER — SPIRONOLACTONE 25 MG/1
12.5 TABLET, FILM COATED ORAL
Qty: 0 | Refills: 0 | DISCHARGE

## 2023-01-10 RX ORDER — METOPROLOL TARTRATE 50 MG
1 TABLET ORAL
Qty: 0 | Refills: 0 | DISCHARGE

## 2023-01-10 RX ORDER — TAMSULOSIN HYDROCHLORIDE 0.4 MG/1
1 CAPSULE ORAL
Qty: 30 | Refills: 0
Start: 2023-01-10 | End: 2023-02-08

## 2023-01-10 RX ORDER — SACUBITRIL AND VALSARTAN 24; 26 MG/1; MG/1
1 TABLET, FILM COATED ORAL
Qty: 60 | Refills: 0
Start: 2023-01-10 | End: 2023-02-08

## 2023-01-10 RX ORDER — CLOPIDOGREL BISULFATE 75 MG/1
1 TABLET, FILM COATED ORAL
Qty: 30 | Refills: 0
Start: 2023-01-10 | End: 2023-02-08

## 2023-01-10 RX ORDER — METOPROLOL TARTRATE 50 MG
1 TABLET ORAL
Qty: 60 | Refills: 0
Start: 2023-01-10 | End: 2023-02-08

## 2023-01-10 RX ORDER — DAPAGLIFLOZIN 10 MG/1
1 TABLET, FILM COATED ORAL
Qty: 0 | Refills: 0 | DISCHARGE

## 2023-01-10 RX ORDER — FUROSEMIDE 40 MG
1 TABLET ORAL
Qty: 60 | Refills: 0
Start: 2023-01-10 | End: 2023-02-08

## 2023-01-10 RX ORDER — SPIRONOLACTONE 25 MG/1
0.5 TABLET, FILM COATED ORAL
Qty: 30 | Refills: 0
Start: 2023-01-10 | End: 2023-02-08

## 2023-01-10 RX ORDER — MIRTAZAPINE 45 MG/1
2 TABLET, ORALLY DISINTEGRATING ORAL
Qty: 0 | Refills: 0 | DISCHARGE

## 2023-01-10 RX ORDER — MIRTAZAPINE 45 MG/1
2 TABLET, ORALLY DISINTEGRATING ORAL
Qty: 60 | Refills: 0
Start: 2023-01-10 | End: 2023-02-08

## 2023-01-10 RX ORDER — VENLAFAXINE HCL 75 MG
1 CAPSULE, EXT RELEASE 24 HR ORAL
Qty: 30 | Refills: 0
Start: 2023-01-10 | End: 2023-02-08

## 2023-01-10 RX ORDER — MIRTAZAPINE 45 MG/1
1 TABLET, ORALLY DISINTEGRATING ORAL
Qty: 60 | Refills: 0 | DISCHARGE
Start: 2023-01-10 | End: 2023-02-08

## 2023-01-10 RX ORDER — METOPROLOL TARTRATE 50 MG
1 TABLET ORAL
Qty: 60 | Refills: 0 | DISCHARGE
Start: 2023-01-10 | End: 2023-02-08

## 2023-01-10 RX ORDER — ASPIRIN/CALCIUM CARB/MAGNESIUM 324 MG
1 TABLET ORAL
Qty: 30 | Refills: 0
Start: 2023-01-10 | End: 2023-02-08

## 2023-01-10 RX ORDER — TAMSULOSIN HYDROCHLORIDE 0.4 MG/1
1 CAPSULE ORAL
Qty: 0 | Refills: 0 | DISCHARGE

## 2023-01-10 RX ORDER — DAPAGLIFLOZIN 10 MG/1
10 TABLET, FILM COATED ORAL DAILY
Refills: 0 | Status: DISCONTINUED | OUTPATIENT
Start: 2023-01-10 | End: 2023-01-10

## 2023-01-10 RX ORDER — CLOPIDOGREL BISULFATE 75 MG/1
75 TABLET, FILM COATED ORAL DAILY
Refills: 0 | Status: DISCONTINUED | OUTPATIENT
Start: 2023-01-10 | End: 2023-01-10

## 2023-01-10 RX ORDER — DAPAGLIFLOZIN 10 MG/1
1 TABLET, FILM COATED ORAL
Qty: 30 | Refills: 0
Start: 2023-01-10 | End: 2023-02-08

## 2023-01-10 RX ADMIN — Medication 81 MILLIGRAM(S): at 12:41

## 2023-01-10 RX ADMIN — CLOPIDOGREL BISULFATE 75 MILLIGRAM(S): 75 TABLET, FILM COATED ORAL at 12:41

## 2023-01-10 RX ADMIN — Medication 40 MILLIGRAM(S): at 05:58

## 2023-01-10 RX ADMIN — Medication 150 MILLIGRAM(S): at 12:42

## 2023-01-10 RX ADMIN — SACUBITRIL AND VALSARTAN 1 TABLET(S): 24; 26 TABLET, FILM COATED ORAL at 09:24

## 2023-01-10 RX ADMIN — Medication 25 MILLIGRAM(S): at 05:57

## 2023-01-10 RX ADMIN — Medication 1 PACKET(S): at 05:59

## 2023-01-10 RX ADMIN — SPIRONOLACTONE 12.5 MILLIGRAM(S): 25 TABLET, FILM COATED ORAL at 09:24

## 2023-01-10 RX ADMIN — DAPAGLIFLOZIN 10 MILLIGRAM(S): 10 TABLET, FILM COATED ORAL at 12:42

## 2023-01-10 RX ADMIN — POLYETHYLENE GLYCOL 3350 17 GRAM(S): 17 POWDER, FOR SOLUTION ORAL at 12:42

## 2023-01-10 NOTE — PROGRESS NOTE ADULT - ATTENDING COMMENTS
77 M with CAD, HFrEF, severe MR, s/p partial right lobectomy for lung nodule here with sob, acute hypoxemic respiratory failure due to acute on chronic decompensated systolic heart failure due to entero/rhinovirus, found to have R pleural effusion and abnormal ct chest with HARRY nodule growing    # acute hypoxemic respiratory failure   # acute on chronic decompensated systolic heart failure  # abnormal ct chest  # pleural effusion  - concern this could be malignant effusion versus related to HF/severe MR; will plan for thoracentesis this afternoon/tomorrow, f/u coags  - supportive care for enterovirus /rhinovirus  - agree with standing diuretics  - afterload control given severe MR  - will need follow up for lung nodule, especially if pleural fluid comes back as not malignant
77 M with CAD, HFrEF, severe MR, s/p partial right lobectomy for lung nodule here with sob, acute hypoxemic respiratory failure due to acute on chronic decompensated systolic heart failure due to entero/rhinovirus, found to have R pleural effusion and abnormal ct chest with HARRY nodule growing    # acute hypoxemic respiratory failure   # acute on chronic decompensated systolic heart failure  # abnormal ct chest  # pleural effusion  - s/p thoracentesis 1/9 that appears to be transudative, perhaps related ot his HF/severe MR  - post thora chest xray reviewed and interpreted by me, no ptx  - standing diuretics on discharge  - needs BP control as outpatient given MR  - recommend outpatient follow up with patient's pulmonologist Dr. Espinal re: cytology from thoracentesis as well as follow up for lung nodule    ok to discharge from pulm perspective
Mr. Becerra is a 77 year old male with PMH significant for CAD with a silent MI this past May, CHF, lung nodule s/p partial right lobectomy c/b cardiogenic shock, and depression who presented due to worsening shortness of breath on exertion.  He is noted to have a right pleural effusion and a 1.7cm HARRY nodule.  Also positive for enterorhinovirus.   Currently being diuresed and Plavix is being held in anticipation of thoracentesis.  Will likely pursue biopsy after patient has improved from heart failure perspective and he is off Plavix for 5 days.  Feels better today.  Agree with plan as outlined above.
Mr. Becerra is a 77 year old male with PMH significant for CAD with a silent MI this past May, CHF, lung nodule s/p partial right lobectomy c/b cardiogenic shock, and depression who presented due to worsening shortness of breath on exertion.  He is noted to have a right pleural effusion and a 1.7cm HARRY nodule.  Also positive for enterorhinovirus.   Currently being diuresed and Plavix is being held in anticipation of thoracentesis on Tuesday.  US examination today shows persistent moderately large effusion on the right.  Agree with plan as outlined above.  Please call with questions over the weekend.
76 y/o M with CAD, ischemic cardiomyopathy, lung nodule s/p recent lobectomy in 9/2022, admitted with shortness of breath. Diuresing on IV diuretics. Continue Lasix 40mg IV BID. Continue HF medical therapy with Toprol XL 25mg BID, Entresto 24-26mg BID, Aldactone 12.5mg daily.   Monitor strict intake/output and daily standing weight. Monitor and replete electrolytes to keep K >4 and Mag >2.
Pt clinically euvolemic, warm extremities.  Feels much better.  S/p pleural effusion tap w transudative fluid. He has new pulmonary nodule. Pulmonology is working up, may need biopsy.  He is tolerating GDMT. Main limitation for further uptitration is BP.  Should f/u w Dr. Shell at [facility].   Repeat TTE as outpt to assess MR.
77M p/w Acute on Chronic Systolic Heart Failure c/w BRODY and found to incidentally have a 1.7cm left lung nodule  pt feeling better  orthopnea resolved  JVD improving    Acute on Chronic Systolic Heart failure  - Strict i/o's, Daily weights, +100mls  - monitor on telemetry   - diuresis: c/w lasix 40mg IV daily  - heart failure eval appreciated    BRODY  - resolved  - due to cardiorenal syndrome given improvement with diuresis  - Creatinine Trend: 1.18<--, 1.56<--, 1.60<--, 1.62<--, 1.77<--    Lung Nodule  - pt with h/o Lung CA s/p resection  - CTA (1/3) showing 1.7cm solid nodule in apicoposterior left upper lobe, enlarged since July 2022 (4mm). 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    wife at bedside updated.
77M p/w Acute on Chronic Systolic Heart Failure c/w BRODY and found to incidentally have a 1.7cm left lung nodule    Acute on Chronic Systolic Heart failure  - Strict i/o's, Daily weights   - monitor on telemetry   - diuresis: 40mg BID  - heart failure eval appreciated  BRODY  - resumed to have a cardiorenal syndrome with should improve with diuresis  - Cr improving with diuresis. Creatinine Trend: 1.60<--, 1.62<--, 1.77<--    Lung Nodule  - pt with h/o Lung CA s/p resection  - CTA (1/3) showing 1.7cm solid nodule in apicoposterior left upper lobe, enlarged since July 2022 (4mm). 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    wife at bedside updated.
77M p/w Acute on Chronic Systolic Heart Failure c/w BRODY and found to incidentally have a 1.7cm left lung nodule  pt feeling better  orthopnea resolved  JVD improving    Acute on Chronic Systolic Heart failure  - Strict i/o's, Daily weights, -1.6L  - monitor on telemetry   - diuresis: decrease to lasix 40mg daily  - heart failure eval appreciated    BRODY  - due to cardiorenal syndrome given improvement with diuresis  - Cr improving with diuresis. Creatinine Trend: 1.56<--, 1.60<--, 1.62<--, 1.77<--    Lung Nodule  - pt with h/o Lung CA s/p resection  - CTA (1/3) showing 1.7cm solid nodule in apicoposterior left upper lobe, enlarged since July 2022 (4mm). 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    wife at bedside updated.
77M p/w Acute on Chronic Systolic Heart Failure c/w BRODY and found to incidentally have a 1.7cm left lung nodule  pt feeling better  orthopnea resolved  JVD improving    Acute on Chronic Systolic Heart failure  - Strict i/o's, Daily weights, +100mls  - monitor on telemetry   - diuresis: transitioned to lasix 40mg po  - heart failure eval appreciated    BRODY  - resolved  - due to cardiorenal syndrome given improvement with diuresis  - Creatinine Trend: 1.12<--, 1.18<--, 1.56<--, 1.60<--, 1.62<--, 1.77<--    Lung Nodule  - pt with h/o Lung CA s/p resection  - CTA (1/3) showing 1.7cm solid nodule in apicoposterior left upper lobe, enlarged since July 2022 (4mm). Pt w/ hx of lung nodule s/p partial right lobectomy w/ path showing adenocarcinoma  - s/p Thoracentesis, sample c/w transudative effusion---> pt will need  a biopsy of the left up lobe nodule    wife at bedside updated.
77M p/w Acute on Chronic Systolic Heart Failure c/w BRODY and found to incidentally have a 1.7cm left lung nodule  pt feeling better  orthopnea resolved  JVD improving    Acute on Chronic Systolic Heart failure  - Strict i/o's, Daily weights, +100mls  - monitor on telemetry   - diuresis: transitioned to lasix 40mg po  - heart failure eval appreciated    BRODY  - resolved  - due to cardiorenal syndrome given improvement with diuresis  - Creatinine Trend: 1.12<--, 1.18<--, 1.56<--, 1.60<--, 1.62<--, 1.77<--    Lung Nodule  - pt with h/o Lung CA s/p resection  - CTA (1/3) showing 1.7cm solid nodule in apicoposterior left upper lobe, enlarged since July 2022 (4mm). Pt w/ hx of lung nodule s/p partial right lobectomy w/ path showing adenocarcinoma  - pulm eval appreciated holding Plavix for possible diagnostic Thora planned for today    wife at bedside updated.
77M p/w Acute on Chronic Systolic Heart Failure c/w BRODY and found to incidentally have a 1.7cm left lung nodule    Acute on Chronic Systolic Heart failure  - Strict i/o's, Daily weights   - monitor on telemetry   - diuresis: 40mg BID  - heart failure team contacted for evaluation    BRODY  - resumed to have a cardiorenal syndrome with should improve with diuresis    Lung Nodule  - pt with h/o Lung CA s/p resection  - CTA (1/3) showing 1.7cm solid nodule in apicoposterior left upper lobe, enlarged since July 2022 (4mm). Pt w/ hx of lung nodule s/p partial right lobectomy w/ path showing adenocarcinoma  - will get pulm eval for possible biopsy +/- diagnostic thoracentesis given moderate pleural effusion
77M p/w Acute on Chronic Systolic Heart Failure c/w BRODY and found to incidentally have a 1.7cm left lung nodule  pt feeling better  orthopnea resolved  JVD improving    Acute on Chronic Systolic Heart failure  - Strict i/o's, Daily weights, +100mls  - monitor on telemetry   - diuresis: transition to lasix 40mg po  - heart failure eval appreciated    BRODY  - resolved  - due to cardiorenal syndrome given improvement with diuresis  - Creatinine Trend: 1.12<--, 1.18<--, 1.56<--, 1.60<--, 1.62<--, 1.77<--    Lung Nodule  - pt with h/o Lung CA s/p resection  - CTA (1/3) showing 1.7cm solid nodule in apicoposterior left upper lobe, enlarged since July 2022 (4mm). 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    wife at bedside updated.

## 2023-01-10 NOTE — PROGRESS NOTE ADULT - ASSESSMENT
77 year old male with PMH significant for CAD with a silent MI this past May, CHF, lung nodule s/p partial right lobectomy c/b cardiogenic shock, and depression who presented due to worsening shortness of breath with exertion most likely related to acute decompensated HF 2/2 to entero/rhinovirus. Pulmonology consulted for right-sided pleural effusion and enlarging HARRY lung nodule.     Recommendations:  - Patient is s/p thoracentesis with 850cc fluid removed yesterday, 1/9/23  - Fluid appears transudative based on protein and LDH, will need to follow up additional studies  - No pulmonary contraindication to discharge  - Patient can follow up with Dr. Espinal, his pulmonologist, within 1 week of discharge for follow up  - He can also follow up with Dr. Padilla for pulmonary nodule    Pasquale Fisher, PGY-7  Pulmonary and Critical Care Medicine  [phone]       77 year old male with PMH significant for CAD with a silent MI this past May, CHF, lung nodule s/p partial right lobectomy c/b cardiogenic shock, and depression who presented due to worsening shortness of breath with exertion most likely related to acute decompensated HF 2/2 to entero/rhinovirus. Pulmonology consulted for right-sided pleural effusion and enlarging HARRY lung nodule.     Recommendations:  - Patient is s/p thoracentesis with 850cc fluid removed yesterday, 1/9/23  - Fluid appears transudative based on protein and LDH, will need to follow up additional studies  - No pulmonary contraindication to discharge  - Patient can follow up with Dr. Espinal, his pulmonologist, within 1 week of discharge for follow up  - He can also follow up with Dr. Padilla for pulmonary nodule  - If after these providers further eval of the nodule is needed, he could follow up with Dr. Machado (Interventional pulmonologist). Clinic phone number provided to patient and wife, would see Dr. Teddy Fisher, PGY-7  Pulmonary and Critical Care Medicine  [phone]

## 2023-01-10 NOTE — PROGRESS NOTE ADULT - SUBJECTIVE AND OBJECTIVE BOX
Patient seen and examined at bedside.    Overnight Events:   No events. Denies CP, palpitations, SOB. Feels much better. Wife at bedside. Discussed overall management and continued care on outpatient basis.     REVIEW OF SYSTEMS:  Constitutional:     [x ] negative [ ] fevers [ ] chills [ ] weight loss [ ] weight gain  HEENT:                  [x ] negative [ ] dry eyes [ ] eye irritation [ ] postnasal drip [ ] nasal congestion  CV:                         [ x] negative  [ ] chest pain [ ] orthopnea [ ] palpitations [ ] murmur  Resp:                     [x ] negative [ ] cough [ ] shortness of breath [ ] dyspnea [ ] wheezing [ ] sputum [ ]hemoptysis  GI:                          [x ] negative [ ] nausea [ ] vomiting [ ] diarrhea [ ] constipation [ ] abd pain [ ] dysphagia   :                        [ x] negative [ ] dysuria [ ] nocturia [ ] hematuria [ ] increased urinary frequency  Musculoskeletal: [x ] negative [ ] back pain [ ] myalgias [ ] arthralgias [ ] fracture  Skin:                       [ x] negative [ ] rash [ ] itch  Neurological:        [ x] negative [ ] headache [ ] dizziness [ ] syncope [ ] weakness [ ] numbness  Psychiatric:           [ x] negative [ ] anxiety [ ] depression  Endocrine:            [ x] negative [ ] diabetes [ ] thyroid problem  Heme/Lymph:      [ x] negative [ ] anemia [ ] bleeding problem  Allergic/Immune: [ x] negative [ ] itchy eyes [ ] nasal discharge [ ] hives [ ] angioedema    [ x] All other systems negative          Current Meds:  aspirin  chewable 81 milliGRAM(s) Oral daily  clopidogrel Tablet 75 milliGRAM(s) Oral daily  dapagliflozin 10 milliGRAM(s) Oral daily  furosemide    Tablet 40 milliGRAM(s) Oral <User Schedule>  latanoprost 0.005% Ophthalmic Solution 1 Drop(s) Both EYES at bedtime  metoprolol succinate ER 25 milliGRAM(s) Oral two times a day  mirtazapine 30 milliGRAM(s) Oral daily  polyethylene glycol 3350 17 Gram(s) Oral daily  psyllium Powder 1 Packet(s) Oral three times a day PRN  sacubitril 24 mG/valsartan 26 mG 1 Tablet(s) Oral two times a day  spironolactone 12.5 milliGRAM(s) Oral daily  tamsulosin 0.4 milliGRAM(s) Oral at bedtime  venlafaxine XR. 150 milliGRAM(s) Oral daily  zolpidem 5 milliGRAM(s) Oral at bedtime PRN      Vitals:  T(F): 97.9 (01-10), Max: 97.9 (01-10)  HR: 74 (01-10) (72 - 80)  BP: 100/65 (01-10) (98/60 - 100/65)  RR: 17 (01-10)  SpO2: 99% (01-10)  I&O's Summary    09 Jan 2023 07:01  -  10 Kimani 2023 07:00  --------------------------------------------------------  IN: 1140 mL / OUT: 0 mL / NET: 1140 mL    10 Kimani 2023 07:01  -  10 Kimani 2023 14:44  --------------------------------------------------------  IN: 0 mL / OUT: 1100 mL / NET: -1100 mL      PHYSICAL EXAM:  Appearance: No acute distress; well appearing  Eyes: no scleral icterus   HEENT: Normal oral mucosa  Cardiovascular: RRR, S1, S2, no murmurs, rubs, or gallops; no edema; minimal JVP  Respiratory: Clear to auscultation bilaterally, no auditory stridor   Gastrointestinal: soft, non-tender, non-distended with normal bowel sounds  Musculoskeletal: No clubbing; no joint deformity   Neurologic: Moving all 4 extremities spontaneously, sensation grossly intact  Psychiatry: AAOx3, mood & affect appropriate  Skin: No rashes, ecchymoses, or cyanosis                          11.3   6.36  )-----------( 178      ( 10 Kimani 2023 07:08 )             36.1     01-10    138  |  103  |  26<H>  ----------------------------<  97  4.5   |  26  |  1.21    Ca    9.1      10 Kimani 2023 07:06  Phos  3.3     01-10  Mg     2.3     01-10      PT/INR - ( 09 Jan 2023 13:32 )   PT: 12.5 sec;   INR: 1.08 ratio         PTT - ( 09 Jan 2023 13:32 )  PTT:29.4 sec  CARDIAC MARKERS ( 03 Jan 2023 22:45 )  45 ng/L / x     / x     / x     / x     / x      CARDIAC MARKERS ( 03 Jan 2023 17:32 )  52 ng/L / x     / x     / x     / x     / x          Serum Pro-Brain Natriuretic Peptide: 5606 pg/mL (01-03 @ 17:32)       WDL No complaints

## 2023-01-10 NOTE — DISCHARGE NOTE NURSING/CASE MANAGEMENT/SOCIAL WORK - NSDCPEFALRISK_GEN_ALL_CORE
For information on Fall & Injury Prevention, visit: [email or web address] OR  [email or web address] OR  https://www.cdc.gov/steadi/patient.html

## 2023-01-10 NOTE — PROGRESS NOTE ADULT - PROBLEM SELECTOR PLAN 2
CTA (1/3) showed moderate loculated R pleural effusion, slightly increased from prior. Given hx of lung adenocarcinoma and larger HARRY pulmonary nodule, suspect malignant effusion vs. less likely infectious given patient is afebrile without a WBC count, and non-toxic appearing   - pulm consulted for thoracentesis +/- biopsy of HARRY pulmonary nodule; appreciate recs  - s/p bedside thoracentesis 1/9; f/u final fluid studies- if negative, will likely pursue biopsy of HARRY pulmonary nodule per pulm (can be done outpatient)  - c/w PO Lasix 40mg qd for continued diuresis  - restarted Plavix 75mg qd  - monitor off abx for now as pt. is afebrile and without leukocytosis CTA (1/3) showed moderate loculated R pleural effusion, slightly increased from prior. Given hx of lung adenocarcinoma and larger HARRY pulmonary nodule, suspect malignant effusion vs. less likely infectious given patient is afebrile without a WBC count, and non-toxic appearing   - pulm consulted for thoracentesis +/- biopsy of HARRY pulmonary nodule; appreciate recs  - s/p bedside thoracentesis 1/9; fluid studies c/w transudative exudate likely 2/2 CHF exacerbation as above  - c/w PO Lasix 40mg qd for continued diuresis  - restarted Plavix 75mg qd  - monitor off abx for now as pt. is afebrile and without leukocytosis  - pt. to f/u w/ pulm outpatient for final cytology and possible biopsy of HARRY pulmonary nodule

## 2023-01-10 NOTE — PROGRESS NOTE ADULT - PROBLEM SELECTOR PLAN 5
HARRY concerning for malignancy vs infectious pathology; possibly biopsy by interventional pulm for further characterization  Appreciate pulmonary recommendations and management input.

## 2023-01-10 NOTE — PROGRESS NOTE ADULT - SUBJECTIVE AND OBJECTIVE BOX
*******************************  Pamella Brambila MD (PGY-1)  Internal Medicine  Contact via [employer] TEAMS  [facility] Pager: [phone]  [facility] Pager: [phone]  *******************************    PROGRESS NOTE:     Patient is a 77y old  Male who presents with a chief complaint of shortness of breath (09 Jan 2023 12:04)    INTERVAL EVENTS: No acute overnight events.     SUBJECTIVE: Patient seen and examined at bedside. This morning, the patient is comfortable and doing well. No acute complaints. Denies fevers, chills, N/V/D, chest pain, SOB, abdominal pain.    MEDICATIONS  (STANDING):  aspirin  chewable 81 milliGRAM(s) Oral daily  clopidogrel Tablet 75 milliGRAM(s) Oral daily  dapagliflozin 10 milliGRAM(s) Oral daily  furosemide    Tablet 40 milliGRAM(s) Oral <User Schedule>  latanoprost 0.005% Ophthalmic Solution 1 Drop(s) Both EYES at bedtime  metoprolol succinate ER 25 milliGRAM(s) Oral two times a day  mirtazapine 30 milliGRAM(s) Oral daily  polyethylene glycol 3350 17 Gram(s) Oral daily  sacubitril 24 mG/valsartan 26 mG 1 Tablet(s) Oral two times a day  spironolactone 12.5 milliGRAM(s) Oral daily  tamsulosin 0.4 milliGRAM(s) Oral at bedtime  venlafaxine XR. 150 milliGRAM(s) Oral daily    MEDICATIONS  (PRN):  psyllium Powder 1 Packet(s) Oral three times a day PRN constipation  zolpidem 5 milliGRAM(s) Oral at bedtime PRN Insomnia    CAPILLARY BLOOD GLUCOSE    I&O's Summary    09 Jan 2023 07:01  -  10 Kimani 2023 07:00  --------------------------------------------------------  IN: 840 mL / OUT: 0 mL / NET: 840 mL    PHYSICAL EXAM:  Vital Signs Last 24 Hrs  T(C): 36.3 (10 Kimani 2023 05:38), Max: 36.7 (09 Jan 2023 09:24)  T(F): 97.4 (10 Kimani 2023 05:38), Max: 98.1 (09 Jan 2023 09:24)  HR: 73 (10 Kimani 2023 05:38) (71 - 87)  BP: 98/62 (10 Kimani 2023 05:38) (95/64 - 104/64)  BP(mean): --  RR: 18 (10 Kimani 2023 05:38) (16 - 18)  SpO2: 98% (10 Kimani 2023 05:38) (95% - 98%)    Parameters below as of 10 Kimani 2023 05:38  Patient On (Oxygen Delivery Method): room air    GENERAL: NAD, lying in bed comfortably  NECK: +JVP up to mid-lower neck  HEART: S1, S2, Regular rate and rhythm, no murmurs, rubs, or gallops  LUNGS: clear to auscultation b/l. Unlabored respirations, no crackles, wheezing, or rhonchi  ABDOMEN: Soft, nontender, nondistended, +BS  EXTREMITIES: 2+ peripheral pulses bilaterally. 1-2+ LE edema, improving  NERVOUS SYSTEM:  A&Ox3, no focal deficits   SKIN: No rashes or lesions    LABS:                        11.4   5.60  )-----------( 186      ( 09 Jan 2023 08:27 )             35.9     01-09    137  |  105  |  28<H>  ----------------------------<  98  4.4   |  23  |  1.15    Ca    9.0      09 Jan 2023 08:27  Phos  3.0     01-09  Mg     2.3     01-09    PT/INR - ( 09 Jan 2023 13:32 )   PT: 12.5 sec;   INR: 1.08 ratio    PTT - ( 09 Jan 2023 13:32 )  PTT:29.4 sec    Culture - Fungal, Body Fluid (collected 09 Jan 2023 18:26)  Source: Pleural Fl Pleural Fluid  Preliminary Report (10 Kimani 2023 06:52):    Testing in progress    Culture - Body Fluid with Gram Stain (collected 09 Jan 2023 18:26)  Source: Pleural Fl Pleural Fluid  Gram Stain (09 Jan 2023 23:54):    polymorphonuclear leukocytes    No organisms seen    by cytocentrifuge    Cell Count, Body Fluid (01.09.23 @ 18:26)    Tube Type: Sterile    Fluid Type: Pleural    Body Fluid Appearance: Hazy    Color - Body Fluid: Yellow    Total Nucleated Cell Count, Body Fluid: 206: Reference Ranges:  Synovial Fluid  Total nucleated cells < 150 cells/uL  Neutrophils <25%  Lymphocytes 10-15%  Monocytes/Macrophages </=70%  Other parameters- No established reference ranges.  Bronchoalveolar lavage  Macrophages >85%  Lymphocytes 10-15%  Neutrophils <3%  Eosinophils <1%  Other parameters- No established reference ranges.  Peritoneal/pleural/pericardial fluid/other body fluid types  No established reference ranges. /uL    Total RBC Count: 5000 /uL    Neutrophils-Body Fluid: 0 %    BF Lymphocytes: 96 %    Monocyte/Macrophage Count - Body Fluid: 3 %    Eosinophil Count - Body Fluid: 1 %    RADIOLOGY & ADDITIONAL TESTS:  Results Reviewed:   Imaging Personally Reviewed:  Electrocardiogram Personally Reviewed:  Tele:    COORDINATION OF CARE:  Care Discussed with Consultants/Other Providers [Y/N]:  Prior or Outpatient Records Reviewed [Y/N]:   *******************************  Pamella Brambila MD (PGY-1)  Internal Medicine  Contact via [employer] TEAMS  [facility] Pager: [phone]  [facility] Pager: [phone]  *******************************    PROGRESS NOTE:     Patient is a 77y old  Male who presents with a chief complaint of shortness of breath (09 Jan 2023 12:04)    INTERVAL EVENTS: s/p bedside thoracentesis yesterday w/ 850cc of serosanguinous fluid removed.    SUBJECTIVE: Patient seen and examined at bedside. This morning, the patient is comfortable and doing well. States his procedure went well and he has no pain. States his breathing is normal and he has been ambulating and having BMs. Denies fevers, chills, N/V/D, chest pain, SOB, abdominal pain.    MEDICATIONS  (STANDING):  aspirin  chewable 81 milliGRAM(s) Oral daily  clopidogrel Tablet 75 milliGRAM(s) Oral daily  dapagliflozin 10 milliGRAM(s) Oral daily  furosemide    Tablet 40 milliGRAM(s) Oral <User Schedule>  latanoprost 0.005% Ophthalmic Solution 1 Drop(s) Both EYES at bedtime  metoprolol succinate ER 25 milliGRAM(s) Oral two times a day  mirtazapine 30 milliGRAM(s) Oral daily  polyethylene glycol 3350 17 Gram(s) Oral daily  sacubitril 24 mG/valsartan 26 mG 1 Tablet(s) Oral two times a day  spironolactone 12.5 milliGRAM(s) Oral daily  tamsulosin 0.4 milliGRAM(s) Oral at bedtime  venlafaxine XR. 150 milliGRAM(s) Oral daily    MEDICATIONS  (PRN):  psyllium Powder 1 Packet(s) Oral three times a day PRN constipation  zolpidem 5 milliGRAM(s) Oral at bedtime PRN Insomnia    CAPILLARY BLOOD GLUCOSE    I&O's Summary    09 Jan 2023 07:01  -  10 Kimani 2023 07:00  --------------------------------------------------------  IN: 840 mL / OUT: 0 mL / NET: 840 mL    PHYSICAL EXAM:  Vital Signs Last 24 Hrs  T(C): 36.3 (10 Kimani 2023 05:38), Max: 36.7 (09 Jan 2023 09:24)  T(F): 97.4 (10 Kimani 2023 05:38), Max: 98.1 (09 Jan 2023 09:24)  HR: 73 (10 Kimani 2023 05:38) (71 - 87)  BP: 98/62 (10 Kimani 2023 05:38) (95/64 - 104/64)  BP(mean): --  RR: 18 (10 Kimani 2023 05:38) (16 - 18)  SpO2: 98% (10 Kimani 2023 05:38) (95% - 98%)    Parameters below as of 10 Kimani 2023 05:38  Patient On (Oxygen Delivery Method): room air    GENERAL: NAD, lying in bed comfortably  NECK: +JVP up to mid-lower neck, improving  HEART: S1, S2, Regular rate and rhythm, no murmurs, rubs, or gallops  LUNGS: clear to auscultation b/l. Unlabored respirations, no crackles, wheezing, or rhonchi  ABDOMEN: Soft, nontender, nondistended, +BS  EXTREMITIES: 2+ peripheral pulses bilaterally. 1-2+ LE edema, improving  NERVOUS SYSTEM:  A&Ox3, no focal deficits   SKIN: No rashes or lesions    LABS:                        11.4   5.60  )-----------( 186      ( 09 Jan 2023 08:27 )             35.9     01-09    137  |  105  |  28<H>  ----------------------------<  98  4.4   |  23  |  1.15    Ca    9.0      09 Jan 2023 08:27  Phos  3.0     01-09  Mg     2.3     01-09    PT/INR - ( 09 Jan 2023 13:32 )   PT: 12.5 sec;   INR: 1.08 ratio    PTT - ( 09 Jan 2023 13:32 )  PTT:29.4 sec    Culture - Fungal, Body Fluid (collected 09 Jan 2023 18:26)  Source: Pleural Fl Pleural Fluid  Preliminary Report (10 Kimani 2023 06:52):    Testing in progress    Culture - Body Fluid with Gram Stain (collected 09 Jan 2023 18:26)  Source: Pleural Fl Pleural Fluid  Gram Stain (09 Jan 2023 23:54):    polymorphonuclear leukocytes    No organisms seen    by cytocentrifuge    Cell Count, Body Fluid (01.09.23 @ 18:26)    Tube Type: Sterile    Fluid Type: Pleural    Body Fluid Appearance: Hazy    Color - Body Fluid: Yellow    Total Nucleated Cell Count, Body Fluid: 206: Reference Ranges:  Synovial Fluid  Total nucleated cells < 150 cells/uL  Neutrophils <25%  Lymphocytes 10-15%  Monocytes/Macrophages </=70%  Other parameters- No established reference ranges.  Bronchoalveolar lavage  Macrophages >85%  Lymphocytes 10-15%  Neutrophils <3%  Eosinophils <1%  Other parameters- No established reference ranges.  Peritoneal/pleural/pericardial fluid/other body fluid types  No established reference ranges. /uL    Total RBC Count: 5000 /uL    Neutrophils-Body Fluid: 0 %    BF Lymphocytes: 96 %    Monocyte/Macrophage Count - Body Fluid: 3 %    Eosinophil Count - Body Fluid: 1 %    RADIOLOGY & ADDITIONAL TESTS:  Results Reviewed:   Imaging Personally Reviewed:  Electrocardiogram Personally Reviewed:  Tele:    COORDINATION OF CARE:  Care Discussed with Consultants/Other Providers [Y/N]:  Prior or Outpatient Records Reviewed [Y/N]:   *******************************  Pamella Brambila MD (PGY-1)  Internal Medicine  Contact via [employer] TEAMS  [facility] Pager: [phone]  [facility] Pager: [phone]  *******************************    PROGRESS NOTE:     Patient is a 77y old  Male who presents with a chief complaint of shortness of breath (09 Jan 2023 12:04)    INTERVAL EVENTS: s/p bedside thoracentesis yesterday w/ 850cc of serosanguinous fluid removed.    SUBJECTIVE: Patient seen and examined at bedside. This morning, the patient is comfortable and doing well. States his procedure went well and he has no pain. States his breathing is normal and he has been ambulating and having BMs. Denies fevers, chills, N/V/D, chest pain, SOB, abdominal pain.    MEDICATIONS  (STANDING):  aspirin  chewable 81 milliGRAM(s) Oral daily  clopidogrel Tablet 75 milliGRAM(s) Oral daily  dapagliflozin 10 milliGRAM(s) Oral daily  furosemide    Tablet 40 milliGRAM(s) Oral <User Schedule>  latanoprost 0.005% Ophthalmic Solution 1 Drop(s) Both EYES at bedtime  metoprolol succinate ER 25 milliGRAM(s) Oral two times a day  mirtazapine 30 milliGRAM(s) Oral daily  polyethylene glycol 3350 17 Gram(s) Oral daily  sacubitril 24 mG/valsartan 26 mG 1 Tablet(s) Oral two times a day  spironolactone 12.5 milliGRAM(s) Oral daily  tamsulosin 0.4 milliGRAM(s) Oral at bedtime  venlafaxine XR. 150 milliGRAM(s) Oral daily    MEDICATIONS  (PRN):  psyllium Powder 1 Packet(s) Oral three times a day PRN constipation  zolpidem 5 milliGRAM(s) Oral at bedtime PRN Insomnia    CAPILLARY BLOOD GLUCOSE    I&O's Summary    09 Jan 2023 07:01  -  10 Kimani 2023 07:00  --------------------------------------------------------  IN: 840 mL / OUT: 0 mL / NET: 840 mL    PHYSICAL EXAM:  Vital Signs Last 24 Hrs  T(C): 36.3 (10 Kimani 2023 05:38), Max: 36.7 (09 Jan 2023 09:24)  T(F): 97.4 (10 Kimani 2023 05:38), Max: 98.1 (09 Jan 2023 09:24)  HR: 73 (10 Kimani 2023 05:38) (71 - 87)  BP: 98/62 (10 Kimani 2023 05:38) (95/64 - 104/64)  BP(mean): --  RR: 18 (10 Kimani 2023 05:38) (16 - 18)  SpO2: 98% (10 Kimani 2023 05:38) (95% - 98%)    Parameters below as of 10 Kimani 2023 05:38  Patient On (Oxygen Delivery Method): room air    GENERAL: NAD, lying in bed comfortably  NECK: +JVP up to mid-lower neck, improving  HEART: S1, S2, Regular rate and rhythm, no murmurs, rubs, or gallops  LUNGS: clear to auscultation b/l. Unlabored respirations, no crackles, wheezing, or rhonchi  ABDOMEN: Soft, nontender, nondistended, +BS  EXTREMITIES: 2+ peripheral pulses bilaterally. 1-2+ LE edema, improving  NERVOUS SYSTEM:  A&Ox3, no focal deficits   SKIN: No rashes or lesions    LABS:                        11.3   6.36  )-----------( 178      ( 10 Kimani 2023 07:08 )             36.1     01-10    138  |  103  |  26<H>  ----------------------------<  97  4.5   |  26  |  1.21    Ca    9.1      10 Kimani 2023 07:06  Phos  3.3     01-10  Mg     2.3     01-10    PT/INR - ( 09 Jan 2023 13:32 )   PT: 12.5 sec;   INR: 1.08 ratio    PTT - ( 09 Jan 2023 13:32 )  PTT:29.4 sec    Culture - Fungal, Body Fluid (collected 09 Jan 2023 18:26)  Source: Pleural Fl Pleural Fluid  Preliminary Report (10 Kimani 2023 06:52):    Testing in progress    Culture - Body Fluid with Gram Stain (collected 09 Jan 2023 18:26)  Source: Pleural Fl Pleural Fluid  Gram Stain (09 Jan 2023 23:54):    polymorphonuclear leukocytes    No organisms seen    by cytocentrifuge    Cell Count, Body Fluid (01.09.23 @ 18:26)    Tube Type: Sterile    Fluid Type: Pleural    Body Fluid Appearance: Hazy    Color - Body Fluid: Yellow    Total Nucleated Cell Count, Body Fluid: 206: Reference Ranges:  Synovial Fluid  Total nucleated cells < 150 cells/uL  Neutrophils <25%  Lymphocytes 10-15%  Monocytes/Macrophages </=70%  Other parameters- No established reference ranges.  Bronchoalveolar lavage  Macrophages >85%  Lymphocytes 10-15%  Neutrophils <3%  Eosinophils <1%  Other parameters- No established reference ranges.  Peritoneal/pleural/pericardial fluid/other body fluid types  No established reference ranges. /uL    Total RBC Count: 5000 /uL    Neutrophils-Body Fluid: 0 %    BF Lymphocytes: 96 %    Monocyte/Macrophage Count - Body Fluid: 3 %    Eosinophil Count - Body Fluid: 1 %    RADIOLOGY & ADDITIONAL TESTS:  Results Reviewed:   Imaging Personally Reviewed:  Electrocardiogram Personally Reviewed:  Tele:    COORDINATION OF CARE:  Care Discussed with Consultants/Other Providers [Y/N]:  Prior or Outpatient Records Reviewed [Y/N]:

## 2023-01-10 NOTE — PROGRESS NOTE ADULT - PROBLEM SELECTOR PROBLEM 4
CAD (coronary artery disease)
BRODY (acute kidney injury)
CAD (coronary artery disease)
BRODY (acute kidney injury)
CAD (coronary artery disease)

## 2023-01-10 NOTE — PROGRESS NOTE ADULT - PROBLEM SELECTOR PLAN 1
Continue Toprol XL 25 mg PO BID, Aldactone 12.5mg QD, Entresto 24/26mg BID, Farxiga 10mg QD  Continue Lasix 40mg PO QD  obtain daily standing weights, strict I&Os.  Patient should follow up with Dr. Shell () on outpatient basis Continue Toprol XL 25 mg PO BID, Aldactone 12.5mg QD, Entresto 24/26mg BID, Farxiga 10mg QD  Continue Lasix 40mg PO QD  obtain daily standing weights, strict I&Os.  Patient should follow up with Dr. LEANDER Shell () on outpatient basis

## 2023-01-10 NOTE — PROGRESS NOTE ADULT - ASSESSMENT
77 year old male with PMH significant for CAD with a silent MI this past May,  ICM (HFrEF 20-25), lung nodule s/p partial right lobectomy presented to [facility] with shortness of breath and volume overloaded. He underwent CT Chest which showed new left upper lobe nodule and right pleural effusion. On exam he is noted to have elevated JVP and BRODY likely related to congestion. He is currently being diuresis with IV diuretics and will optimize his GDMT as his pressure allows. He will need further workup regarding this new lung nodule. HF consulted for assistance in management of CHF.     Cardiac Testing:  Echo 1/4/2023: LVEF 27%, LVEDD 6.5cm, LVEDS 5.1cm, decreased RV function, severe MR (VC 0.5cm, MR vol 36mL. EROA 0.24cm), severe bi-AE  RHC 5/2022: RA 15, PA 56/134 (41), PCWP 32 with V waves to 40s, PA sat 59%, CO 3, CI 1.7

## 2023-01-10 NOTE — PROGRESS NOTE ADULT - PROBLEM SELECTOR PLAN 1
Pt. p/w 2 weeks worsening dyspnea on exertion, PND and orthopnea x 2 weeks. Pro-BNP 5600 on admission w/ CTA showing moderate loculated R pleural effusion and pt. w/ +entero/rhinovirus on RVP. Presentation most likely 2/2 acute on chronic CHF exacerbation i/s/o entero/rhinovirus  - TTE (11/2022): EF 22% w/ severe LV systolic dysfunction, decreased RV systolic function, and severe pHTN  - TTE (1/4/2023): EF 27% w/ eccentric LVH, severe LVSD, akinesis of apical, septal, inferior walls and hypokinesis of remaining walls, severe LAE w/ dec RVSF  - HF following; appreciate recs  - c/w PO Lasix 40mg qd for continued diuresis  - c/w Aldactone 12.5mg qd  - c/w metoprolol succinate 50mg qd  - c/w Entresto 24-26mg qd  - c/w Farxiga 10mg qd  - strict I/Os, daily standing weights  - monitor electrolytes and replete for K>4 and Mg>2

## 2023-01-10 NOTE — DISCHARGE NOTE NURSING/CASE MANAGEMENT/SOCIAL WORK - NSDCVIVACCINE_GEN_ALL_CORE_FT
COVID-19, mRNA, LNP-S, PF, 100 mcg/ 0.5 mL dose (Moderna); 17-May-2022 14:07; Pankaj Velazco (RN); [employer] [id number] (Exp. Date: 05-Jun-2022); IntraMuscular; Deltoid Left.; 0.25 milliLiter(s);

## 2023-01-10 NOTE — PROGRESS NOTE ADULT - PROBLEM SELECTOR PROBLEM 2
Pleural effusion
Pleural effusion
CAD (coronary artery disease)
Pleural effusion
CAD (coronary artery disease)
Pleural effusion

## 2023-01-10 NOTE — PROGRESS NOTE ADULT - PROBLEM SELECTOR PLAN 5
CTA (1/3) showing 1.7cm solid nodule in apicoposterior left upper lobe, enlarged since July 2022 (4mm). Pt w/ hx of lung nodule s/p partial right lobectomy w/ path showing adenocarcinoma  - pulm consulted for possible biopsy; appreciate recs- to discuss w/ interventional pulm regarding biopsy, however will pursue with thoracentesis first (performed 1/9)  - Dr. Padilla's team aware per wife request  - if no inpatient workup, then consider follow-up chest CT in 3 months, PET/CT outpatient CTA (1/3) showing 1.7cm solid nodule in apicoposterior left upper lobe, enlarged since July 2022 (4mm). Pt w/ hx of lung nodule s/p partial right lobectomy w/ path showing adenocarcinoma  - pulm consulted for possible biopsy; appreciate recs- to discuss w/ interventional pulm regarding biopsy, however will pursue with thoracentesis first (performed 1/9)  - s/p bedside thoracentesis 1/9; fluid studies c/w transudative exudate likely 2/2 CHF exacerbation as above  - pt. to f/u w/ pulm outpatient for final cytology and possible biopsy of HARRY pulmonary nodule +/- mirta w/ Dr. Padilla for further management

## 2023-01-10 NOTE — PROGRESS NOTE ADULT - PROBLEM SELECTOR PLAN 3
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  However, MR severity has worsened progressively since then.  Echo from this admission now shows severe MR.   Can be further investigated on outpatient basis with structural cardiology or repeat TTE TTE from 5/12/22 describes flail mitral chordea with no clear MR.  However, MR severity has worsened progressively since then.  Echo from this admission now shows severe MR.   Recommend repeat TTE as outpt now that he's euvolemic and on GDMT

## 2023-01-10 NOTE — DISCHARGE NOTE NURSING/CASE MANAGEMENT/SOCIAL WORK - NSDCFUADDAPPT_GEN_ALL_CORE_FT
Medication refill escribed per protocol/Dr Christina Pitts.     Please follow up with Dr. Walden (PCP/cardiologist), Dr. Padilla (thoracic surgery), and Dr. Vaughan (pulmonology) within 1-2 weeks of discharge from the hospital.

## 2023-01-10 NOTE — PROGRESS NOTE ADULT - ASSESSMENT
77M with CAD s/p NSTEMI in May 2022, HFrEF (EF 22% in 11/2022), lung nodule s/p partial right lobectomy c/b cardiogenic shock, and depression who presented due to worsening shortness of breath with exertion x 2 weeks, orthopnea, paroxysmal nocturnal dyspnea, found to have elevated pro-BNP, +JVP, +entero/rhinovirus w/ imaging showing R pleural effusion. Presentation likely 2/2 acute on chronic CHF exacerbation i/s/o entero/rhinovirus, currently being diuresed and pending thoracentesis w/ pulmonology.    77M with CAD s/p NSTEMI in May 2022, HFrEF (EF 22% in 11/2022), lung nodule s/p partial right lobectomy c/b cardiogenic shock, and depression who presented due to worsening shortness of breath with exertion x 2 weeks, orthopnea, paroxysmal nocturnal dyspnea, found to have elevated pro-BNP, +JVP, +entero/rhinovirus w/ imaging showing R pleural effusion. Presentation likely 2/2 acute on chronic CHF exacerbation i/s/o entero/rhinovirus, currently being diuresed, s/p thoracentesis w/ pulmonology w/ fluid studies c/w transudative exudate.

## 2023-01-10 NOTE — PROGRESS NOTE ADULT - PROBLEM SELECTOR PLAN 10
Diet: DASH/TLC  DVT PPx: SCDs; holding chemical VTE ppx for thoracentesis w/ pulm  Dispo: pending clinical improvement  Code Status: full code
Diet: DASH/TLC  DVT PPx: pt. ambulatory  Dispo: pending clinical improvement  Code Status: full code
Diet: DASH/TLC  DVT PPx: SCDs; holding chemical VTE ppx for thoracentesis w/ pulm  Dispo: pending clinical improvement  Code Status: full code

## 2023-01-10 NOTE — PROGRESS NOTE ADULT - PROBLEM SELECTOR PROBLEM 3
BRODY (acute kidney injury)
BRODY (acute kidney injury)
Severe mitral regurgitation
BRODY (acute kidney injury)
Severe mitral regurgitation
BRODY (acute kidney injury)

## 2023-01-10 NOTE — DISCHARGE NOTE NURSING/CASE MANAGEMENT/SOCIAL WORK - PATIENT PORTAL LINK FT
You can access the FollowMyHealth Patient Portal offered by [facility] by registering at the following website: [email or web address]. By joining "[employer]’s FollowMyHealth portal, you will also be able to view your health information using other applications (apps) compatible with our system.

## 2023-01-10 NOTE — PROGRESS NOTE ADULT - PROBLEM SELECTOR PLAN 7
Hx of depression; symptoms noting to be worsening this admission after recent passing of pt's brother. No SI/HI  - psych consulted; appreciate recs- no acute changes to regimen at this time   - c/w home Effexor 150mg qd  - c/w Mirtazapine 30mg qd

## 2023-01-10 NOTE — PROGRESS NOTE ADULT - SUBJECTIVE AND OBJECTIVE BOX
CHIEF COMPLAINT: Shortness of breath    Interval Events: Patient is s/p thoracentesis yesterday, tolerated well. He reports feeling well today.    REVIEW OF SYSTEMS:  Constitutional: No fevers or chills.   Eyes: No itching or discharge from the eyes  ENT: No ear pain. No ear discharge. No nasal congestion.   CV: No chest pain. No palpitations. No lightheadedness or dizziness.   Resp: No dyspnea at rest. No dyspnea on exertion.  GI: No nausea. No vomiting. No diarrhea.  MSK: No joint pain or pain in any extremities  Integumentary: No skin lesions. No pedal edema.  Neurological: No gross motor weakness. No sensory changes.  [x] All other systems negative  [ ] Unable to assess ROS because ________    OBJECTIVE:  ICU Vital Signs Last 24 Hrs  T(C): 36.6 (10 Kimani 2023 09:03), Max: 36.6 (09 Jan 2023 13:30)  T(F): 97.8 (10 Kimani 2023 09:03), Max: 97.9 (09 Jan 2023 13:30)  HR: 77 (10 Kimani 2023 09:03) (71 - 80)  BP: 99/62 (10 Kimani 2023 09:03) (98/60 - 104/64)  BP(mean): --  ABP: --  ABP(mean): --  RR: 18 (10 Kimani 2023 09:03) (18 - 18)  SpO2: 98% (10 Kimani 2023 09:03) (95% - 98%)    O2 Parameters below as of 10 Kimani 2023 09:03  Patient On (Oxygen Delivery Method): room air    01-09 @ 07:01  -  01-10 @ 07:00  --------------------------------------------------------  IN: 1140 mL / OUT: 0 mL / NET: 1140 mL    01-10 @ 07:01  -  01-10 @ 12:24  --------------------------------------------------------  IN: 0 mL / OUT: 1100 mL / NET: -1100 mL    CAPILLARY BLOOD GLUCOSE    PHYSICAL EXAM:  General: Awake, alert, oriented X 3.   HEENT: Atraumatic, normocephalic.   Lymph Nodes: No palpable lymphadenopathy  Neck: No JVD. No carotid bruit.   Respiratory: Normal chest expansion. Clear to ausculation bilaterally.  Cardiovascular: S1 S2 normal. No murmurs, rubs or gallops.   Abdomen: Soft, non-tender, non-distended. No organomegaly.  Extremities: Warm to touch. Peripheral pulse palpable. No pedal edema.   Skin: No rashes or skin lesions  Neurological: Motor and sensory examination equal and normal in all four extremities.  Psychiatry: Appropriate mood and affect.    HOSPITAL MEDICATIONS:  MEDICATIONS  (STANDING):  aspirin  chewable 81 milliGRAM(s) Oral daily  clopidogrel Tablet 75 milliGRAM(s) Oral daily  dapagliflozin 10 milliGRAM(s) Oral daily  furosemide    Tablet 40 milliGRAM(s) Oral <User Schedule>  latanoprost 0.005% Ophthalmic Solution 1 Drop(s) Both EYES at bedtime  metoprolol succinate ER 25 milliGRAM(s) Oral two times a day  mirtazapine 30 milliGRAM(s) Oral daily  polyethylene glycol 3350 17 Gram(s) Oral daily  sacubitril 24 mG/valsartan 26 mG 1 Tablet(s) Oral two times a day  spironolactone 12.5 milliGRAM(s) Oral daily  tamsulosin 0.4 milliGRAM(s) Oral at bedtime  venlafaxine XR. 150 milliGRAM(s) Oral daily    MEDICATIONS  (PRN):  psyllium Powder 1 Packet(s) Oral three times a day PRN constipation  zolpidem 5 milliGRAM(s) Oral at bedtime PRN Insomnia      LABS:                        11.3   6.36  )-----------( 178      ( 10 Kimani 2023 07:08 )             36.1     01-10    138  |  103  |  26<H>  ----------------------------<  97  4.5   |  26  |  1.21    Ca    9.1      10 Kimani 2023 07:06  Phos  3.3     01-10  Mg     2.3     01-10      PT/INR - ( 09 Jan 2023 13:32 )   PT: 12.5 sec;   INR: 1.08 ratio         PTT - ( 09 Jan 2023 13:32 )  PTT:29.4 sec          MICROBIOLOGY:     RADIOLOGY:  [x] Reviewed and interpreted by me  CXR 1/9: No PTX (my read)    Point of Care Ultrasound Findings:    PFT:    EKG:

## 2023-01-10 NOTE — PROGRESS NOTE ADULT - PROVIDER SPECIALTY LIST ADULT
Pulmonology
Heart Failure
Internal Medicine
Heart Failure
Internal Medicine

## 2023-01-10 NOTE — PROGRESS NOTE ADULT - PROBLEM SELECTOR PLAN 3
SCr on admission 1.77, baseline ~0.8; likely i/s/o cardiorenal syndrome; resolved  - urine urea 350, UCr 28, FEUrea 40.4%  - c/w PO Lasix 40mg qd for continued diuresis  - avoid nephrotoxic meds and renally dose medications   - monitor creatinine daily

## 2023-01-10 NOTE — PROGRESS NOTE ADULT - TIME BILLING
reviewing chart and coordinating care with primary team/staff, as well as reviewing vitals, radiology, medication list, recent labs, and prior records.
Personal review of data, imaging and discussion with medical team.
Personal review of data, imaging and discussion with medical team.
reviewing chart and coordinating care with primary team/staff, as well as reviewing vitals, radiology, medication list, recent labs, and prior records.  pleural effusion, etiology
- Review of notes/records, telemetry, vital signs and daily labs.   - General and cardiovascular physical examination.  - Generation of cardiovascular treatment plan.  - Coordination of care with primary team.

## 2023-01-11 ENCOUNTER — TRANSCRIPTION ENCOUNTER (OUTPATIENT)
Age: 78
End: 2023-01-11

## 2023-01-12 ENCOUNTER — TRANSCRIPTION ENCOUNTER (OUTPATIENT)
Age: 78
End: 2023-01-12

## 2023-01-12 LAB — NON-GYNECOLOGICAL CYTOLOGY STUDY: SIGNIFICANT CHANGE UP

## 2023-01-13 ENCOUNTER — TRANSCRIPTION ENCOUNTER (OUTPATIENT)
Age: 78
End: 2023-01-13

## 2023-01-14 LAB
CULTURE RESULTS: SIGNIFICANT CHANGE UP
SPECIMEN SOURCE: SIGNIFICANT CHANGE UP

## 2023-01-17 ENCOUNTER — APPOINTMENT (OUTPATIENT)
Dept: CARE COORDINATION | Facility: HOME HEALTH | Age: 78
End: 2023-01-17
Payer: MEDICARE

## 2023-01-17 ENCOUNTER — TRANSCRIPTION ENCOUNTER (OUTPATIENT)
Age: 78
End: 2023-01-17

## 2023-01-17 DIAGNOSIS — F32.A DEPRESSION, UNSPECIFIED: ICD-10-CM

## 2023-01-17 PROCEDURE — 99443: CPT | Mod: 95

## 2023-01-17 RX ORDER — ZOLPIDEM TARTRATE 5 MG/1
5 TABLET ORAL
Qty: 30 | Refills: 0 | Status: DISCONTINUED | COMMUNITY
Start: 2022-10-04 | End: 2023-01-17

## 2023-01-17 RX ORDER — LOSARTAN POTASSIUM 50 MG/1
50 TABLET, FILM COATED ORAL
Qty: 90 | Refills: 1 | Status: DISCONTINUED | COMMUNITY
Start: 2022-11-28 | End: 2023-01-17

## 2023-01-17 RX ORDER — MIRTAZAPINE 7.5 MG/1
7.5 TABLET, FILM COATED ORAL
Refills: 0 | Status: DISCONTINUED | COMMUNITY
Start: 2022-06-06 | End: 2023-01-17

## 2023-01-17 NOTE — HISTORY OF PRESENT ILLNESS
[Home] : at home, [unfilled] , at the time of the visit. [Other Location: e.g. Home (Enter Location, City,State)___] : at [unfilled] [Spouse] : spouse [Verbal consent obtained from patient] : the patient, [unfilled] [Post-hospitalization from ___ Hospital] : Post-hospitalization from [unfilled] Hospital [Admitted on: ___] : The patient was admitted on [unfilled] [Discharged on ___] : discharged on [unfilled] [Discharge Summary] : discharge summary [Discharge Med List] : discharge medication list [Med Reconciliation] : medication reconciliation has been completed [Patient Contacted By: ____] : and contacted by [unfilled] [FreeTextEntry3] : Patient was contacted by TCM RN on 1/11/23 for 24/48 hour call and documentation is present in the Clinical Viewer  [FreeTextEntry2] : 77M with CAD s/p NSTEMI in May 2022, HFrEF (EF 22% in 11/2022), lung nodule s/p partial right lobectomy c/b cardiogenic shock, and depression who presented due to worsening shortness of breath with exertion x 2 weeks, orthopnea, paroxysmal nocturnal dyspnea, found to have elevated pro-BNP, +JVD, +entero/rhinovirus w/ CTA chest showing mildly loculated R pleural effusion and a 1.7cm L upper lobe pulmonary nodule. Patient was seen by heart failure and was diuresed with clinical improvement. He was also seen by pulmonology for possible thoracentesis, which he underwent on 1/9 after Plavix was held for 5d. Fluid studies were consistent with a transudative process likely from heart failure exacerbation. The patient's GDMT for heart failure was optimized and he was started on Farxiga and Entresto. During this admission, the patient was also seen by psych due to worsening depression; no changes were made. \par \par Since Dc, pt states he is feeling better but still has occasional episodes of OGLESBY.  He denies cp/pnd/orthopnea.  Uses 2 pillows to sleep.  Denies any edema.  Has not been weighing himself daily, last weight was 2 days ago and he was 174lbs.  He has multiple f/u's.  Dr. Padilla (CT) on 1/23, Dr. Walden (cardio) 1/24; CHF Dr. Dumont 1/26; EP Dr. Freeman 1/26; pulm Dr. Phillips 1/25.  He is planning on going to [place] on 2/7 for 3.5 weeks.  Refusing HC/PT due to reporting no needs currently, HC had 1 visit and told him he no longer qualiies.  TCM numbers provided for any questions/concerns.

## 2023-01-17 NOTE — PHYSICAL EXAM
[No Acute Distress] : no acute distress [No Respiratory Distress] : no respiratory distress  [de-identified] : per TH

## 2023-01-18 PROBLEM — J90 PLEURAL EFFUSION: Status: ACTIVE | Noted: 2023-01-18

## 2023-01-19 ENCOUNTER — OUTPATIENT (OUTPATIENT)
Dept: OUTPATIENT SERVICES | Facility: HOSPITAL | Age: 78
LOS: 1 days | Discharge: HOME | End: 2023-01-19

## 2023-01-19 DIAGNOSIS — Z98.890 OTHER SPECIFIED POSTPROCEDURAL STATES: Chronic | ICD-10-CM

## 2023-01-19 DIAGNOSIS — C34.00 MALIGNANT NEOPLASM OF UNSPECIFIED MAIN BRONCHUS: ICD-10-CM

## 2023-01-20 ENCOUNTER — RESULT REVIEW (OUTPATIENT)
Age: 78
End: 2023-01-20

## 2023-01-20 ENCOUNTER — LABORATORY RESULT (OUTPATIENT)
Age: 78
End: 2023-01-20

## 2023-01-20 ENCOUNTER — APPOINTMENT (OUTPATIENT)
Dept: HEMATOLOGY ONCOLOGY | Facility: CLINIC | Age: 78
End: 2023-01-20

## 2023-01-20 LAB
BASOPHILS # BLD AUTO: 0.05 K/UL — SIGNIFICANT CHANGE UP (ref 0–0.2)
BASOPHILS NFR BLD AUTO: 1.1 % — SIGNIFICANT CHANGE UP (ref 0–2)
EOSINOPHIL # BLD AUTO: 0.11 K/UL — SIGNIFICANT CHANGE UP (ref 0–0.5)
EOSINOPHIL NFR BLD AUTO: 2.3 % — SIGNIFICANT CHANGE UP (ref 0–6)
HCT VFR BLD CALC: 39.6 % — SIGNIFICANT CHANGE UP (ref 39–50)
HGB BLD-MCNC: 12.3 G/DL — LOW (ref 13–17)
IMM GRANULOCYTES NFR BLD AUTO: 0.2 % — SIGNIFICANT CHANGE UP (ref 0–0.9)
LYMPHOCYTES # BLD AUTO: 1.06 K/UL — SIGNIFICANT CHANGE UP (ref 1–3.3)
LYMPHOCYTES # BLD AUTO: 22.3 % — SIGNIFICANT CHANGE UP (ref 13–44)
MCHC RBC-ENTMCNC: 28.7 PG — SIGNIFICANT CHANGE UP (ref 27–34)
MCHC RBC-ENTMCNC: 31.1 G/DL — LOW (ref 32–36)
MCV RBC AUTO: 92.3 FL — SIGNIFICANT CHANGE UP (ref 80–100)
MONOCYTES # BLD AUTO: 0.74 K/UL — SIGNIFICANT CHANGE UP (ref 0–0.9)
MONOCYTES NFR BLD AUTO: 15.5 % — HIGH (ref 2–14)
NEUTROPHILS # BLD AUTO: 2.79 K/UL — SIGNIFICANT CHANGE UP (ref 1.8–7.4)
NEUTROPHILS NFR BLD AUTO: 58.6 % — SIGNIFICANT CHANGE UP (ref 43–77)
NRBC # BLD: 0 /100 WBCS — SIGNIFICANT CHANGE UP (ref 0–0)
PLATELET # BLD AUTO: 202 K/UL — SIGNIFICANT CHANGE UP (ref 150–400)
RBC # BLD: 4.29 M/UL — SIGNIFICANT CHANGE UP (ref 4.2–5.8)
RBC # FLD: 17.5 % — HIGH (ref 10.3–14.5)
RETICS #: 99.1 K/UL — SIGNIFICANT CHANGE UP (ref 25–125)
RETICS/RBC NFR: 2.3 % — SIGNIFICANT CHANGE UP (ref 0.5–2.5)
WBC # BLD: 4.76 K/UL — SIGNIFICANT CHANGE UP (ref 3.8–10.5)
WBC # FLD AUTO: 4.76 K/UL — SIGNIFICANT CHANGE UP (ref 3.8–10.5)

## 2023-01-23 ENCOUNTER — APPOINTMENT (OUTPATIENT)
Dept: THORACIC SURGERY | Facility: CLINIC | Age: 78
End: 2023-01-23
Payer: MEDICARE

## 2023-01-23 VITALS
HEART RATE: 71 BPM | SYSTOLIC BLOOD PRESSURE: 97 MMHG | BODY MASS INDEX: 23.7 KG/M2 | OXYGEN SATURATION: 97 % | RESPIRATION RATE: 17 BRPM | HEIGHT: 72 IN | DIASTOLIC BLOOD PRESSURE: 62 MMHG | WEIGHT: 175 LBS

## 2023-01-23 DIAGNOSIS — J90 PLEURAL EFFUSION, NOT ELSEWHERE CLASSIFIED: ICD-10-CM

## 2023-01-23 PROCEDURE — 99214 OFFICE O/P EST MOD 30 MIN: CPT

## 2023-01-23 NOTE — PHYSICAL EXAM
[] : no respiratory distress [Auscultation Breath Sounds / Voice Sounds] : lungs were clear to auscultation bilaterally [Heart Rate And Rhythm] : heart rate was normal and rhythm regular [Heart Sounds] : normal S1 and S2 [Heart Sounds Gallop] : no gallops [Murmurs] : no murmurs [Heart Sounds Pericardial Friction Rub] : no pericardial rub [Chest Visual Inspection Thoracic Asymmetry] : no chest asymmetry [Examination Of The Chest] : the chest was normal in appearance [Diminished Respiratory Excursion] : normal chest expansion

## 2023-01-24 ENCOUNTER — TRANSCRIPTION ENCOUNTER (OUTPATIENT)
Age: 78
End: 2023-01-24

## 2023-01-24 ENCOUNTER — NON-APPOINTMENT (OUTPATIENT)
Age: 78
End: 2023-01-24

## 2023-01-24 ENCOUNTER — APPOINTMENT (OUTPATIENT)
Dept: CARDIOLOGY | Facility: CLINIC | Age: 78
End: 2023-01-24
Payer: MEDICARE

## 2023-01-24 VITALS
DIASTOLIC BLOOD PRESSURE: 78 MMHG | RESPIRATION RATE: 16 BRPM | WEIGHT: 175 LBS | SYSTOLIC BLOOD PRESSURE: 98 MMHG | HEIGHT: 72 IN | OXYGEN SATURATION: 95 % | BODY MASS INDEX: 23.7 KG/M2

## 2023-01-24 PROCEDURE — 93000 ELECTROCARDIOGRAM COMPLETE: CPT

## 2023-01-24 PROCEDURE — 99214 OFFICE O/P EST MOD 30 MIN: CPT

## 2023-01-25 ENCOUNTER — OUTPATIENT (OUTPATIENT)
Dept: OUTPATIENT SERVICES | Facility: HOSPITAL | Age: 78
LOS: 1 days | End: 2023-01-25
Payer: MEDICARE

## 2023-01-25 ENCOUNTER — APPOINTMENT (OUTPATIENT)
Dept: NUCLEAR MEDICINE | Facility: CLINIC | Age: 78
End: 2023-01-25

## 2023-01-25 ENCOUNTER — APPOINTMENT (OUTPATIENT)
Dept: CARDIOLOGY | Facility: CLINIC | Age: 78
End: 2023-01-25
Payer: MEDICARE

## 2023-01-25 ENCOUNTER — APPOINTMENT (OUTPATIENT)
Dept: ULTRASOUND IMAGING | Facility: CLINIC | Age: 78
End: 2023-01-25
Payer: MEDICARE

## 2023-01-25 DIAGNOSIS — R60.9 EDEMA, UNSPECIFIED: ICD-10-CM

## 2023-01-25 DIAGNOSIS — I42.9 CARDIOMYOPATHY, UNSPECIFIED: ICD-10-CM

## 2023-01-25 PROCEDURE — 93970 EXTREMITY STUDY: CPT

## 2023-01-25 PROCEDURE — 78815 PET IMAGE W/CT SKULL-THIGH: CPT | Mod: MH

## 2023-01-25 PROCEDURE — 93306 TTE W/DOPPLER COMPLETE: CPT

## 2023-01-25 PROCEDURE — 93970 EXTREMITY STUDY: CPT | Mod: 26

## 2023-01-25 PROCEDURE — A9552: CPT

## 2023-01-25 PROCEDURE — 78815 PET IMAGE W/CT SKULL-THIGH: CPT | Mod: 26,PS,MH

## 2023-01-26 ENCOUNTER — NON-APPOINTMENT (OUTPATIENT)
Age: 78
End: 2023-01-26

## 2023-01-26 ENCOUNTER — APPOINTMENT (OUTPATIENT)
Dept: HEART FAILURE | Facility: CLINIC | Age: 78
End: 2023-01-26
Payer: MEDICARE

## 2023-01-26 ENCOUNTER — APPOINTMENT (OUTPATIENT)
Dept: ELECTROPHYSIOLOGY | Facility: CLINIC | Age: 78
End: 2023-01-26
Payer: MEDICARE

## 2023-01-26 VITALS
OXYGEN SATURATION: 99 % | HEART RATE: 76 BPM | DIASTOLIC BLOOD PRESSURE: 65 MMHG | BODY MASS INDEX: 23.7 KG/M2 | HEIGHT: 72 IN | SYSTOLIC BLOOD PRESSURE: 105 MMHG | WEIGHT: 175 LBS | TEMPERATURE: 96.7 F

## 2023-01-26 PROCEDURE — 99215 OFFICE O/P EST HI 40 MIN: CPT

## 2023-01-26 PROCEDURE — 99205 OFFICE O/P NEW HI 60 MIN: CPT

## 2023-01-26 NOTE — HISTORY OF PRESENT ILLNESS
[FreeTextEntry1] : John Becerra is a 76y/o man with Hx of bipolar disorder, CAD/STEMI (5/2022) s/p R/LHC with IABP support which showed  mid %, LAD 70%, D1 70% and RA 15, PCWP 31, PA sat 59%, CO 3.63, CI 1.70, PVR 2.76; medical management recommended; TTE showed LV thrombus treated with Coumadin (completed 8/26/22) , HFrEF (EF 34%, LVIDd6.1, moderate TR and severe PH with repeat 11/28/22 with decline in LVEF 22%), RUL mass/adenocarcinoma s/p R. VATS (9/23/2022) c/b cardiogenic shock requiring inotropic support and diuretics, and recent hospitalization (1/2023) for acute HF exacerbation c/b pleural effusion s/p thoracentesis who presents today for initial evaluation. Seeing HF today in office. Struggling with severe LV dysfunction since May 2022. Treated on OMT, now on Farxiga and Entresto. Stable dyspnea on exertion. Follows with CTsx as well as HemeOnc for newly found pulmonary nodule. Struggling with depression, medically managed. Seen by Psych during recent hospitalization. Feels well at present. Denies chest pain, palpitations, SOB at rest, syncope or near syncope.\par

## 2023-01-26 NOTE — ASSESSMENT
[FreeTextEntry1] : Mr. ELYSE MALAVE, 77 year old male, never smoker, w/ hx of bipolar, BPH, SCC of leg, CHF, CAD, hospitalized on May 11-17/2022 for acute MI s/p cardiac Cath with no intervention (LHC showed 100% occlusion for right coronary artery and 70% occlusion of left anterior descending artery. The arteries were not intervened upon due to viability study showing no viability in the areas supplied by the occluded arteries), cardiogenic shock, found to have new 1x 1.3cm LV thrombus on Plavix and Coumadin (Coumadin will be completed on 08/26/2022), who f/u with cardiologist and c/o SOB, CXR showed RUL nodule, PET/CT showed FDG avid RUL mass, referred by Dr. Nicholas Espinal. \par \par Patient is s/p CT guided bx of RUL on 09/14/2022. Path showed positive for malignant cells. Adenocarcinoma. PD-L1 0%. \par \par Patient is s/p FB, Right VATS, RULobecotmy on 09/23/2022. Path of RULobectomy revealed Adenocarcinoma, acinar predominant, 3.5 cm, G2, 10R and 11R are negative, pT2aN0. Path of right pleural effusion revealed negative for malignant cells. \par \par Intra-op patient hypotensive and requiring pressor support. The upper lobe was then  from the middle lobe and the lower lobe using multiple firings of the [place] thick tissue stapler, this was placed in an Endo Catch bag and removed from the chest. Given the instability of the patient, the decision was made not to continue with a lymph node dissection.\par \par Post operatively on inotropes which were weaned off and patient is stable. Heart failure team consulting on the patient. Started patient on Entresto and continue Lasix. Plavix restarted. \par \par F/u with Dr. Amee Garcia (Card) on 10/13/2022 for CHF. \par \par Refer patient to Dr. Mayelin Schwartz (Hem/Onc) on 11/23/3022, favor expectant surveillance approach. \par \par CT chest on 12/27/2022: \par - Patient is status post right upper lobectomy. An approximately 2 cm lobulated opacity is present in the left upper lobe. It is new when compared to previous exam. Minimal compressive atelectasis is noted involving portion of the right lower lobe. This is secondary to small size right pleural effusion. Very small left pleural effusion is also noted.\par \par Patient is c/o worsening SOB, evaluated by Dr. Oh on 01/03/2023, sent to ED and positive for rhinovirus. suspect dyspnea on exertion is secondary to CHF exacerbation,  CTA  on 1/3/2023 revealed mildly increased moderate loculated right pleural effusion since 12/27/2022. Indeterminate 1.7 cm apicoposterior left upper lobe nodule, enlarged since July 2022 (4 mm). consulted Pulm recommended plan to sample pleural fluid first because positive cytology would indicate mets (if negative would likely pursue biopsy of HARRY lung nodule). Patient is s/p Right thoracentesis on 01/09/2023, 850 ml fluid was drained. Path revealed negative for malignant cells. \par \par I have reviewed the patient's medical records and diagnostic images at time of this office consultation and have made the following recommendation:\par 1. CTA reviewed and explained to patient and his wife, HARRY nodule increase in size, patient is scheduled for PET/CT on 01/25/2023 and consult with Dr. Que Alejandre on 01/30/2023, recommended patient to f/u with Dr. Alejandre and RTC in 3 months with CT chest w/o contrast. \par \par I, ERNESTINA Mandujano, personally performed the evaluation and management (E/M) services for this established patient who follow up today with an existing condition.  That E/M includes conducting the examination, assessing all new/exacerbated/existing conditions, and establishing a plan of care.  Today, my ACP, ANGEL Gustafson, was here to observe my evaluation and management services for this existing condition to be followed going forward.

## 2023-01-26 NOTE — HISTORY OF PRESENT ILLNESS
[FreeTextEntry1] : Mr. ELYSE MALAVE, 77 year old male, never smoker, w/ hx of bipolar, BPH, SCC of leg, CHF, CAD, hospitalized on May 11-17/2022 for acute MI s/p cardiac Cath with no intervention (LHC showed 100% occlusion for right coronary artery and 70% occlusion of left anterior descending artery. The arteries were not intervened upon due to viability study showing no viability in the areas supplied by the occluded arteries), cardiogenic shock, found to have new 1x 1.3cm LV thrombus on Plavix and Coumadin (Coumadin will be completed on 08/26/2022), who f/u with cardiologist and c/o SOB, CXR showed RUL nodule, PET/CT showed FDG avid RUL mass, referred by Dr. Nicholas Espinal. \par \par Patient is s/p CT guided bx of RUL on 09/14/2022. Path showed positive for malignant cells. Adenocarcinoma. PD-L1 0%. \par \par Patient is s/p FB, Right VATS, RULobecotmy on 09/23/2022. Path of RULobectomy revealed Adenocarcinoma, acinar predominant, 3.5 cm, G2, 10R and 11R are negative, pT2aN0. Path of right pleural effusion revealed negative for malignant cells. \par \par Intra-op patient hypotensive and requiring pressor support. The upper lobe was then  from the middle lobe and the lower lobe using multiple firings of the [place] thick tissue stapler, this was placed in an Endo Catch bag and removed from the chest. Given the instability of the patient, the decision was made not to continue with a lymph node dissection.\par \par Post operatively on inotropes which were weaned off and patient is stable. Heart failure team consulting on the patient. Started patient on Entresto and continue Lasix. Plavix restarted. \par \par F/u with Dr. Amee Garcia (Card) on 10/13/2022 for CHF. \par \par Refer patient to Dr. Mayelin Schwartz (Hem/Onc) on 11/23/3022, favor expectant surveillance approach. \par \par CT chest on 12/27/2022: \par - Patient is status post right upper lobectomy. An approximately 2 cm lobulated opacity is present in the left upper lobe. It is new when compared to previous exam. Minimal compressive atelectasis is noted involving portion of the right lower lobe. This is secondary to small size right pleural effusion. Very small left pleural effusion is also noted.\par \par Patient is c/o worsening SOB, evaluated by Dr. Oh on 01/03/2023, sent to ED and positive for rhinovirus. suspect dyspnea on exertion is secondary to CHF exacerbation,  CTA  on 1/3/2023 revealed mildly increased moderate loculated right pleural effusion since 12/27/2022. Indeterminate 1.7 cm apicoposterior left upper lobe nodule, enlarged since July 2022 (4 mm). consulted Pulm recommended plan to sample pleural fluid first because positive cytology would indicate mets (if negative would likely pursue biopsy of HARRY lung nodule). Patient is s/p Right thoracentesis on 01/09/2023, 850 ml fluid was drained. Path revealed negative for malignant cells. \par \par Patient is here today for a follow up. Patient c/o SOB on exertion, denies cough, chest pain, fever, chills.

## 2023-01-26 NOTE — DISCUSSION/SUMMARY
[FreeTextEntry1] : Impression:\par \par 1. Chronic systolic CHF: NYHA Class II symptoms. EKG reviewed with NSR. Review of ECHOs reveals severe LV dysfunction. Given NYHA Class II symptoms and severe LV dysfunction despite > 3 mo of OMT, recommend undergoing ICD placement for primary prevention of SCD. A thorough discussion was had with the patient concerning all aspects of ICD therapy. We reviewed the data supporting ICD therapy and how it applies individually.  We discussed the procedures, risks and outcomes of ICD implantation an living with an ICD. We discussed management of ICD therapy throughout life, including deactivation of the ICD. Will consider ICD placement in future. First, follow up with [facility] for newly found pulmonary nodule. Resume OMT as prescribed, encouraged heart healthy diet, daily weight, and regular f/u with Cardiology/Heart failure team as scheduled.\par \par 2. HTN: resume oral antihypertensives as prescribed. Encouraged heart healthy diet, sodium restriction, and weight loss. Continue regular f/u with Cardiologist for further HTN management.\par \par 3. HLD: resume statin therapy as prescribed and regular f/u with Cardiologist for routine lipid monitoring and management.\par \par Consider ICD in future.\par Will continue f/u with Cardiologist and may RTO as needed or if any new or worsening symptoms or findings occur.

## 2023-01-26 NOTE — CONSULT LETTER
[FreeTextEntry2] : Dr. Nicholas Espinal (Pulm/Ref) [FreeTextEntry3] : Stevie Padilla MD \par Attending Surgeon \par Division of Thoracic Surgery \par , Cardiovascular and Thoracic Surgery \par [facility] [employer] [facility]\par \par

## 2023-01-26 NOTE — CARDIOLOGY SUMMARY
[de-identified] : 1/4/2023: Conclusions: \par 1. Normal mitral valve. Severe mitral regurgitation. The \par EROA using flow convergence method is 0.24 cm2,  MR \par regurgitant volume is 36 mL/beat. MR vena contracta is 0.51 \par cm. \par 2. Normal trileaflet aortic valve. Mild-moderate aortic \par regurgitation. \par 3. Eccentric left ventricular hypertrophy (dilated left \par ventricle with normal relative wall thickness).Severe \par segmental left ventricular systolic dysfunction. LVEF \par calculated using biplane Pizarro's method is 27%. Akinesis \par of the apical, septal, inferior walls with remaining walls \par hypokinetic.  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 Unable to determine diastolic function due to \par mrerged E and A wave. \par 4. Severe right atrial enlargement.Right ventricular \par enlargement with decreased right ventricular systolic \par function. \par 5. Normal tricuspid valve. Mild tricuspid regurgitation. \par *** Compared with echocardiogram of 5/20/2022, there is no \par longer an LV thrombus and no pericardial effusion. \par \par 9/24/2022\par CONCLUSIONS:\par 1. Mitral annular calcification, otherwise normal mitral\par valve. Moderate mitral regurgitation.\par 2. Severe left ventricular enlargement.\par 3. Severe global left ventricularsystolic dysfunction.\par 4. Moderate diastolic dysfunction (Stage II).\par 5. Right ventricular enlargement with normal right\par ventricular systolic function.\par 6. Estimated pulmonary artery systolic pressure equals 82\par mm Hg, assuming right atrial pressureequals 10 mm Hg,\par consistent with severe pulmonary hypertension. [de-identified] : Cardiac Viability (05.12.22 @ 15:17)\par NUCLEAR FINDINGS:\par The left ventricle was normal in size. The mid to distal\par anterior, mid to distalanteroseptal, apical, and inferior\par walls do not demonstrate significant viability. The\par septum demonstrates mild viability. The remaining segments\par are viable (predominantly the lateral wall only).\par ------------------------------------------------------------------------\par GATED ANALYSIS:\par Rest gated wall motion analysis was performed (LVEF = 29\par %;LVEDV = 205 ml.), revealing markedly reduced LV\par function with regional variation.\par ------------------------------------------------------------------------\par IMPRESSIONS:Abnormal Study\par * The left ventricle was normal in size. The mid to distal\par anterior, mid to distal anteroseptal, apical, and inferior\par walls do not demonstrate significant viability. The\par septum demonstrates mild viability. Theremaining segments\par are viable (predominantly the lateral wall only).\par * Rest gated wall motion analysis was performed (LVEF = 29\par %;LVEDV = 205 ml.), revealing markedly reduced LV\par function with regional variation.\par Conclusion:\par The left ventricle was normal in size. The mid to distal\par anterior, mid to distal anteroseptal, apical, and inferior\par walls do not demonstrate significant viability. The\par septum demonstrates mild viability. The remaining segments\par are viable (predominantly the lateral wall only). [de-identified] : Study Date: 05/11/2022 03:16PM Page 3 of 4\par \par Cardiac Catheterization (05.11.22 @ 15:16) >\par Intra-aortic Balloon Pump\par An intra-aortic balloon pump was inserted.\par Diagnostic Findings:\par Coronary Angiography\par The coronary circulation is right dominant.\par LM\par Left main artery: Angiography shows no disease.\par LAD\par Proximal left anterior descending: There is a 70 % stenosis. First\par diagonal: There is a 70 % stenosis.\par Patient: ELYSE MALAVE MRN: [id number]\par Study Date: 05/11/2022 03:16 PM Page 1 of 4\par CX\par Circumflex: Angiography shows no disease.\par RCA\par Mid right coronary artery: There is a 100 % stenosis.\par Exam Start Time: 03:16 PM\par Exam End Time: 03:53 PM\par Exam Duration: 37 min\par Patient: ELYSE MALAVE MRN: [id number]\par Study Date: 05/11/2022 03:16 PM Page 2 of 4\par Hemodynamic Pressures:\par Phase Location [mmHg] [mmHg]\par [mmHg] HR\par Baseline LV s 115\par ed 36 88\par Baseline Ao s 126 d 85\par m 102 103\par Baseline RV s 50\par ed 17 81\par Baseline PA s 56 d 31\par m 41 81\par Baseline PCW a 32 v 34\par m 31 77\par Baseline RA a 20 v 16\par m 15 95\par \par Oxygen Saturations\par Phase Location Hb Sat pO2\par Content Group\par Baseline AO 14 99\par 19.33 SA\par Baseline PA 14 59\par 11.48 PA\par \par Oxygen Saturation Average, Phase: Baseline\par PV Hb PV Sat PV pO2\par PV Content\par SA Hb 14.40 g/dL SA Sat 98.70 % SA pO2\par SA Content 19.33 %\par PA Hb 14.40 g/dL PA Sat 58.60 % PA pO2\par PA Content 11.48 %\par MV Hb MV Sat MV pO2\par MV Content\par Strokework:\par Phase: Baseline\par LVSW: 34.02 g*m LVSW (index):\par 15.96 g*m/m2\par RVSW: 15.84 g*m RVSW (index):\par 7.43 g*m/m2\par Flow Calculations, Phase: Baseline\par CO 3.63 l/min HR\par O2Insp\par CI 1.70 l/min/m2 PO2\par O2Exp\par SV VO2 285.00 ml/min\par O2(ExAir)\par SVI A-VO2\par Hb 14.40 gm/d\par Shunts:\par Qs 3.63 l/min Qs Index 1.70\par l/min/m2\par Qp 3.63 l/min Qp Index 1.70\par l/min/m2 Qp/Qs\par EPBF EPBF Index\par L-R L-R Index\par R-L R-L Index\par Systemic Resistances, Phase: Baseline\par SVR 1917.35 dyn*s/cm5 TVR\par 2247.93 dyn*s/cm5\par \par \par \par SVRI 4086.28 dyn*s*m2/cm5 TVRI 4790.81 dyn*s*m2/cm5\par SVR 23.97 MERCADO TVR\par 28.11 MERCADO\par SVRI 51.09 MERCADO*m2 TVRI\par 59.90 MERCADO*m2\par Pulmonary Resistances:\par .39 dyn*s/cm5 TPR\par 903.58 dyn*s/cm5\par PVRI 469.69 dyn*s*m2/cm5 TPRI\par 1925.72 dyn*s*m2/cm5\par PVR 2.76 MERCADO TPR\par 11.30 MERCADO\par PVRI 5.87 MERCADO*m2 TPRI\par 24.08 MERCADO*m2\par Ratios:\par PVR-SVR 0.11 TPR-TVR\par 0.4\par Shunts:\par venO2 CO\par R-L Shunt R-L Shunt\par Sammy. Method\par L-R Shunt L-R Shunt\par R-L Shunt R-L Shunt\par Sammy. Method\par \par Conscious sedation was administered and monitored by Cardiology\par nursing staff,\par under my direct supervision when applicable.

## 2023-01-27 NOTE — REVIEW OF SYSTEMS
[SOB on Exertion] : shortness of breath during exertion [Anxiety] : anxiety [Negative] : Allergic/Immunologic

## 2023-01-27 NOTE — DISCUSSION/SUMMARY
[Patient] : the patient [FreeTextEntry3] : 4 [___ Week(s)] : in [unfilled] week(s) [FreeTextEntry2] : wife [FreeTextEntry1] : 1. Chronic systolic congestive heart failure. LVEF 27% ( TTE 1/4/23)\par - continue  Entresto 24-26 mg bid,  will not increase to 49-51 mg bid due to low B/P readings\par - continue Farxiga 5 mg dally \par - decrease Lasix  to 20 mg daily from 40mg daily with additional as needed for weight gain of 2-3 lbs in 2-3 days\par - continue Toprol XL 25 mg bid \par - continue brenda 12.5 mg daily\par - continue Daily  weight and B/P log\par - labs 1/20/23 K 4.9 BUN/creat 32/1.30\par - limit salt to less than 2000 mg per day\par - limit fluid to less than 2 liters per day\par - Seen by EP and will consider ICD placement after he follows with hem/oncology for pulmonary nodule. \par \par 2. CAD - no active chest pain\par - medical management of  mid %, LAD 70%, D1 70%\par -continue plavix, ASA, statin and beta blocker metoprolol\par \par 3. Elevated HgA1C/ DM\par - continue Farxiga 5 mg daily,\par \par Follow up in office in 4 weeks, will call with any change in symptoms [EKG obtained to assist in diagnosis and management of assessed problem(s)] : EKG obtained to assist in diagnosis and management of assessed problem(s)

## 2023-01-27 NOTE — ASSESSMENT
[FreeTextEntry1] : 77 year old Male with PMH of bipolar disorder, CAD/STEMI (5/2022) s/p R/LHC with IABP support which showed  mid %, LAD 70%, D1 70% and RA 15, PCWP 31, PA sat 59%, CO 3.63, CI 1.70, PVR 2.76; medical management recommended; TTE showed LV thrombus treated with Coumadin (completed 8/26/22) , HFrEF (EF 34%, LVIDd6.1, moderate TR and severe PH with repeat 11/28/22 with decline in LVEF 22%), RUL mass s/p R. VATS (9/23/2022) c/b cardiogenic shock requiring inotropic support and diuretics. Pertinent labs:AST 2768, ALT 1653 RHC: RA 15, PCWP 31, PA sat 59%, CO 3.63, CI 1.70, PVR.2.7. S/p thoracentesis 1/9/23.Pt is here for a follow up after readmission 1/3-/23-1/10/23 secondary to ADHF and is here for a post hospital follow up with HF and EP to discuss ICD.\par \par ACC/AHA Stage, NYHA Class II-III symptoms\par Appears compensated and normotensive with B/P 105/65 but home B/P range \par \par \par \par \par

## 2023-01-27 NOTE — HISTORY OF PRESENT ILLNESS
[FreeTextEntry1] : 77 year old Male with PMH of bipolar disorder, CAD/STEMI (5/2022) s/p R/LHC with IABP support which showed  mid %, LAD 70%, D1 70% and RA 15, PCWP 31, PA sat 59%, CO 3.63, CI 1.70, PVR 2.76; medical management recommended; TTE showed LV thrombus treated with Coumadin (completed 8/26/22) , HFrEF (EF 34%, LVIDd6.1, moderate TR and severe PH with repeat 11/28/22 with decline in LVEF 22%), RUL mass s/p R. VATS (9/23/2022) c/b cardiogenic shock requiring inotropic support and diuretics. Pertinent labs:AST 2768, ALT 1653 RHC: RA 15, PCWP 31, PA sat 59%, CO 3.63, CI 1.70, PVR.2.7. S/p thoracentesis 1/9/23.Pt is here for a follow up after readmission 1/3-/23-1/10/23 secondary to ADHF and is here for a post hospital follow up with HF and EP to discuss IC . Accompanied by his wife. Card:Dr. Walden.\par \par Course in hospital: presented 1/3/23 worsening shortness of breath with exertion x 2 weeks, orthopnea, paroxysmal  nocturnal dyspnea, found to have elevated pro-BNP, +JVD, +entero/rhinovirus w/  CTA chest showing mildly loculated R pleural effusion and a 1.7cm L upper lobe  pulmonary nodule. Patient was seen by heart failure and was diuresed with  clinical improvement. He was also seen by pulmonology for possible thoracentesis, which he underwent on 1/9 after Plavix was held for 5d. Fluid  studies were consistent with a transudative process likely from heart failure exacerbation. The patient's GDMT for heart failure was optimized and he was started on Farxiga and Entresto. During this admission, the patient was also seen by psych due to worsening depression; no changes were made. \par \par Pt  is here for a post hospital follow up today with his wife. States he has been feeling a better since D/C. lHe is tolerating his GDMT including restarting Entresto at lower dose 24-26 mg bid ( previously on 49-51 mg bid but switched to losartan due to cost). \par \par States D/c weight was approx 173 lbs and he is 173 lbs at home and 175 lbs in office with clothes with B/P at home  and is 105/65 in office today with  HR 76 bpm. He is taking furosemide 40 mg daily with no additional doses. \par \par States he followed up with his PCP/cardiologist Dr. Walden and ordered a PET/CT 1/25/23. Dr. Jaime reviewed results with patient and his wife notable for hypermetabolic HARRY opacity highly suspicious for malignancy, small left pleural effusion. He is scheduled to follow up with oncologist Dr. Schwartz and radiation oncologist Dr. Mann next week.\par He had a doppler of legs 1/25/23 with no DVT bilaterally and a TTE with results pending. Last TTE during admission 1/4/23 with LVEF 27%, LVIDD 6.5 cm , no LV thrombus, severe LV systolic dysfunction, decreased RV systolic dysfunction, severe MR, mild TR. \par \par Currently, he is able to walk for 20 minutes and can climb a flight of stairs without dyspnea. sleeps with 2 pillows with no orthopnea. He is adhering to a low salt diet and is drinking less than 2 liters of fluid per day\par \par He denies chest pain, palpitations, dizziness/LH and syncope and he does not have an ICD. Seen by EP and will consider ICD placement after he follows with hem/oncology for pulmonary nodule. \par \par Of note, he is  , non smoker, no ETOH, + family history of CAD in brothers; one with ICD and 3 V CABG, other with CHF, stents\par \par \par Patient name: ELYSE MALAVE\par YOB: 1945   Age: 77 (M)   MR#: [id number]\par Study Date: 11/28/2022\par Location: O/PSonographer: Venus Mcpherson RDCS\par Study quality: Technically good\par Referring Physician: VIPUL JEROME NP\par Blood Pressure: 98/62 mmHg\par Height: 183 cm\par Weight: 79 kg\par BSA: 2 m2\par ------------------------------------------------------------------------\par PROCEDURE: Transthoracic echocardiogram with 2-D, M-Mode\par and complete spectral and color flow Doppler.\par INDICATION: Heart failure, unspecified (I50.9)\par ------------------------------------------------------------------------\par DIMENSIONS:\par Dimensions:     Normal Values:\par LA:     4.6 cm    2.0 - 4.0 cm\par Ao:     3.5 cm    2.0 - 3.8 cm\par SEPTUM: 0.8 cm    0.6 - 1.2 cm\par PWT:    0.8 cm    0.6 - 1.1 cm\par LVIDd:  6.1 cm    3.0 - 5.6 cm\par LVIDs:  5.5 cm    1.8 - 4.0 cm\par Derived Variables:\par LVMI: 95 g/m2\par RWT: 0.26\par Fractional short: 10 %\par Ejection Fraction (Pizarro Rule): 22 %\par ------------------------------------------------------------------------\par OBSERVATIONS:\par Mitral Valve: Mitral annular calcification, otherwise\par normal mitral valve. Moderate mitral regurgitation.\par Aortic Root: Normal aortic root.\par Aortic Valve: Calcified trileaflet aortic valve with normal\par opening. Mild aortic regurgitation. \par Left Atrium: Severely dilated left atrium.  LA volume index\par = 49 cc/m2.\par Left Ventricle: Severe global left ventricular systolic\par dysfunction. Mild left ventricular enlargement. Moderate\par diastolic dysfunction (Stage II).\par Right Heart: Moderate right atrial enlargement. Right\par ventricular enlargement with decreased right ventricular\par systolic function. Normal tricuspid valve.  Mild-moderate\par tricuspid regurgitation. Normal pulmonic valve.  Mild\par pulmonic regurgitation.\par Pericardium/PleuraNormal pericardium with no pericardial\par effusion.\par Hemodynamic: Estimated right ventricular systolic pressure\par equals 62 mm Hg, assuming right atrial pressure equals 10\par mm Hg, consistent with severe pulmonary hypertension.\par ------------------------------------------------------------------------\par CONCLUSIONS:\par 1. Mitral annular calcification, otherwise normal mitral\par valve. Moderate mitral regurgitation.\par 2. Calcified trileaflet aortic valve with normal opening.\par Mild aortic regurgitation. \par 3. Severely dilated left atrium.  LA volume index = 49\par cc/m2.\par 4. Mild left ventricular enlargement.\par 5. Severe global left ventricular systolic dysfunction.\par 6. Moderate diastolic dysfunction (Stage II).\par 7. Moderate right atrial enlargement.\par 8. Right ventricular enlargement with decreased right\par ventricular systolic function.\par 9. Estimated right ventricular systolic pressure equals 62\par mm Hg, assuming right atrial pressure equals 10 mm Hg,\par consistent with severe pulmonary hypertension.\par *** Compared with echocardiogram of 9/24/2022, left\par ventricular systolic function has deteriorated.  The\par estimated pulmonary artery systolic pressure has decreased.\par ------------------------------------------------------------------------\par Confirmed on  11/28/2022 - 13:32:27 by Nick Mead M.D.,\par ASHIAREAL\par ------------------------------------------------------------------------\par \par \par Echocardiogram:\par Transthoracic Echocardiogram (09.24.22 @ 14:33)\par DIMENSIONS:\par Dimensions: Normal Values:\par LA: 4.1 cm 2.0 - 4.0 cm\par Ao: 3.7 cm 2.0 - 3.8 cm\par SEPTUM: 1.2 cm 0.6 - 1.2 cm\par PWT: 1.2 cm 0.6 - 1.1 cm\par LVIDd: 6.1 cm 3.0 - 5.6 cm\par LVIDs: --- 1.8 - 4.0 cm\par Derived Variables:\par LVMI: 161 g/m2\par RWT: 0.39\par Ejection Fraction (Modified Pizarro Rule): 34 %\par ------------------------------------------------------------------------\par OBSERVATIONS:\par Mitral Valve: Mitral annular calcification, otherwise\par normal mitral valve. Moderate mitral regurgitation.\par Aortic Root: Normal aortic root.\par Aortic Valve: Calcified trileaflet aortic valve with normal\par opening. Mild aortic regurgitation.\par Left Atrium: Normal left atrium. LA volume index = 28\par cc/m2.\par Left Ventricle: Severe global left ventricular systolic\par dysfunction. Severe left ventricular enlargement. Moderate\par diastolic dysfunction (Stage II).\par Right Heart: Normal right atrium. Right ventricular\par enlargement with normal right ventricular systolic\par function. Normal tricuspid valve. Moderate tricuspid\par regurgitation. Normal pulmonic valve. Mild pulmonic\par regurgitation.\par Pericardium/PleuraNormal pericardium with no pericardial\par effusion.\par Hemodynamic: Estimated right ventricular systolic pressure\par equals 82 mm Hg, assuming right atrial pressure equals 10\par mm Hg, consistent with severe pulmonary hypertension.\par ------------------------------------------------------------------------\par CONCLUSIONS:\par 1. Mitral annular calcification, otherwise normal mitral\par valve. Moderate mitral regurgitation.\par 2. Severe left ventricular enlargement.\par 3. Severe global left ventricularsystolic dysfunction.\par 4. Moderate diastolic dysfunction (Stage II).\par 5. Right ventricular enlargement with normal right\par ventricular systolic function.\par 6. Estimated pulmonary artery systolic pressure equals 82\par mm Hg, assuming right atrial pressureequals 10 mm Hg,\par consistent with severe pulmonary hypertension.\par \par  Cardiac Catheterization (05.11.22 @ 15:16) >\par Intra-aortic Balloon Pump\par An intra-aortic balloon pump was inserted.\par Diagnostic Findings:\par Coronary Angiography\par The coronary circulation is right dominant.\par LM\par Left main artery: Angiography shows no disease.\par LAD\par Proximal left anterior descending: There is a 70 % stenosis. First\par diagonal: There is a 70 % stenosis.\par Patient: ELYSE MALAVE MRN: [id number]\par Study Date: 05/11/2022 03:16 PM Page 1 of 4\par CX\par Circumflex: Angiography shows no disease.\par RCA\par Mid right coronary artery: There is a 100 % stenosis.\par Exam Start Time: 03:16 PM\par Exam End Time: 03:53 PM\par Exam Duration: 37 min\par Patient: ELYSE MALAVE MRN: [id number]\par Study Date: 05/11/2022 03:16 PM Page 2 of 4\par Hemodynamic Pressures:\par Phase Location [mmHg] [mmHg]\par [mmHg] HR\par Baseline LV s 115\par ed 36 88\par Baseline Ao s 126 d 85\par m 102 103\par Baseline RV s 50\par ed 17 81\par Baseline PA s 56 d 31\par m 41 81\par Baseline PCW a 32 v 34\par m 31 77\par Baseline RA a 20 v 16\par m 15 95\par \par Oxygen Saturations\par Phase Location Hb Sat pO2\par Content Group\par Baseline AO 14 99\par 19.33 SA\par Baseline PA 14 59\par 11.48 PA\par \par Oxygen Saturation Average, Phase: Baseline\par PV Hb PV Sat PV pO2\par PV Content\par SA Hb 14.40 g/dL SA Sat 98.70 % SA pO2\par SA Content 19.33 %\par PA Hb 14.40 g/dL PA Sat 58.60 % PA pO2\par PA Content 11.48 %\par MV Hb MV Sat MV pO2\par MV Content\par Strokework:\par Phase: Baseline\par LVSW: 34.02 g*m LVSW (index):\par 15.96 g*m/m2\par RVSW: 15.84 g*m RVSW (index):\par 7.43 g*m/m2\par Flow Calculations, Phase: Baseline\par CO 3.63 l/min HR\par O2Insp\par CI 1.70 l/min/m2 PO2\par O2Exp\par SV VO2 285.00 ml/min\par O2(ExAir)\par SVI A-VO2\par Hb 14.40 gm/d\par Shunts:\par Qs 3.63 l/min Qs Index 1.70\par l/min/m2\par Qp 3.63 l/min Qp Index 1.70\par l/min/m2 Qp/Qs\par EPBF EPBF Index\par L-R L-R Index\par R-L R-L Index\par Systemic Resistances, Phase: Baseline\par SVR 1917.35 dyn*s/cm5 TVR\par 2247.93 dyn*s/cm5\par \par Patient: ELYSE MALAVE MRN: [id number]\par Study Date: 05/11/2022 03:16PM Page 3 of 4\par \par \par \par \par SVRI 4086.28 dyn*s*m2/cm5 TVRI 4790.81 dyn*s*m2/cm5\par SVR 23.97 MERCADO TVR\par 28.11 MERCADO\par SVRI 51.09 MERCADO*m2 TVRI\par 59.90 MERCADO*m2\par Pulmonary Resistances:\par .39 dyn*s/cm5 TPR\par 903.58 dyn*s/cm5\par PVRI 469.69 dyn*s*m2/cm5 TPRI\par 1925.72 dyn*s*m2/cm5\par PVR 2.76 MERCADO TPR\par 11.30 MERCADO\par PVRI 5.87 MERCADO*m2 TPRI\par 24.08 MERCADO*m2\par Ratios:\par PVR-SVR 0.11 TPR-TVR\par 0.4\par Shunts:\par venO2 CO\par R-L Shunt R-L Shunt\par Sammy. Method\par L-R Shunt L-R Shunt\par R-L Shunt R-L Shunt\par Sammy. Method\par \par Conscious sedation was administered and monitored by Cardiology\par nursing staff,\par under my direct supervision when applicable.\par \par Electronically signed by Stew Helms MD on 05/11/2022 at 05:17 PM\par \par \par \par \par Cardiac Viability (05.12.22 @ 15:17)\par NUCLEAR FINDINGS:\par The left ventricle was normal in size. The mid to distal\par anterior, mid to distalanteroseptal, apical, and inferior\par walls do not demonstrate significant viability. The\par septum demonstrates mild viability. The remaining segments\par are viable (predominantly the lateral wall only).\par ------------------------------------------------------------------------\par GATED ANALYSIS:\par Rest gated wall motion analysis was performed (LVEF = 29\par %;LVEDV = 205 ml.), revealing markedly reduced LV\par function with regional variation.\par ------------------------------------------------------------------------\par IMPRESSIONS:Abnormal Study\par * The left ventricle was normal in size. The mid to distal\par anterior, mid to distal anteroseptal, apical, and inferior\par walls do not demonstrate significant viability. The\par septum demonstrates mild viability. Theremaining segments\par are viable (predominantly the lateral wall only).\par * Rest gated wall motion analysis was performed (LVEF = 29\par %;LVEDV = 205 ml.), revealing markedly reduced LV\par function with regional variation.\par Conclusion:\par The left ventricle was normal in size. The mid to distal\par anterior, mid to distal anteroseptal, apical, and inferior\par walls do not demonstrate significant viability. The\par septum demonstrates mild viability. The remaining segments\par are viable (predominantly the lateral wall only).\par ------------------------------------------------------------------------\par \par \par \par \par Assessment and Plan:\par - Assessment \par 77 year old Male with PMH of bipolar disorder, CAD/STEMI (5/2022) s/p R/LHC\par with IABP support which showed  mid %, LAD 70%, D1 70% and RA 15,\par PCWP 31, PA sat 59%, CO 3.63, CI 1.70, PVR 2.76; medical management\par recommended; TTE showed LV thrombus treated with Coumadin, HFrEF (EF 34%, LVIDd\par 6.1, moderate TR and severe PH), RUL mass s/p R. VATS (9/23/2022) c/b\par cardiogenic shock requiring inotropic support and diuretics. Pertinent labs:\par AST 2768, ALT 1653 RHC: RA 15, PCWP 31, PA sat 59%, CO 3.63, CI 1.70, PVR\par 2.76.\par 9/28 Dobutamine weaned off and hemodynamically stable\par 9/29 Lactate = 1.3 and VSat = 53.9% CO/CI 3.2/1.5\par BNP decreased to 3,514 AST/ALT decreased to 351/652\par \par \par \par \par \par Problem/Plan - \par Attestation Statements:\par Attestation Statements:\par Attending and PA/NP shared services statement (NON-critical care):\par \par Attending to bill.\par \par I independently performed the documented:\par \par Medical decision making.\par \par \par \par                      \par \par PROCEDURE DATE:  10/01/2022  CXR\par INTERPRETATION:  INDICATION: S/P VATS\par COMPARISON: 9/30/22\par Technique: AP radiograph of the chest\par FINDINGS:\par S/P removal of right IJ line.\par Heart/Vascular: Stable mild cardiomegaly.\par Pulmonary: New, small pleural effusion. Remainder of the lungs are clear. \par No focal infiltrate. No pneumothorax\par Bones: No acute bony finding\par Impression:\par New, small right pleural effusion.\par \par \par \par

## 2023-01-27 NOTE — PHYSICAL EXAM
[Well Nourished] : well nourished [No Acute Distress] : no acute distress [Normal Conjunctiva] : normal conjunctiva [Normal S1, S2] : normal S1, S2 [Good Air Entry] : good air entry [No Respiratory Distress] : no respiratory distress  [Soft] : abdomen soft [Non Tender] : non-tender [Normal Gait] : normal gait [No Edema] : no edema [No Rash] : no rash [Normal] : alert and oriented, normal memory [Alert and Oriented] : alert and oriented [de-identified] : JVP 6 cm  [de-identified] : left base crackles

## 2023-01-27 NOTE — CARDIOLOGY SUMMARY
[de-identified] : 1/26/23 NSR with 1 st degree AVB 74. LAFB, PRWP, NSST\par 11/28/22 NSR with 1 st degree AVB 70. LAFB, PRWP, NSST\par 10/27/22  NSR 71,  LAFB, PRWP, NSST \par 10/13/22 NSR 98, LAFB, PRWP, NSST with PVC's [de-identified] : TTE 1/4/23 with LVEF 27%, LVIDD 6.5 cm , no LV thrombus, severe LV systolic dysfunction, decreased RV systolic dysfunction, severe MR, mild TR. \par \par \par 11/28/22 TTE; LVEF 22%, LVIDD 6.1 cm, mod MR, severe global LV systolic dysfunction, moderate diastolic dysfunction Stage II, with decreased RV systolic function, severe pHTN\par \par 9/24/22 TTE; LVEF 34%, LVIDD 6.1 cm, mod MR, mild AR, normal LA, severe global LV systolic dysfunction, moderate diastolic dysfunction Stage II, normal RV systolic function. mod TR, RV systolic function 82 mmHg c/q severe pHTN [de-identified] : The left ventricle was normal in size. The mid to distal\par anterior, mid to distal anteroseptal, apical, and inferior\par walls do not demonstrate significant viability. The\par septum demonstrates mild viability. The remaining segments\par are viable (predominantly the lateral wall only).\par He had a viability study with reported left ventricle was normal in size. The mid to distal anterior, mid to distal anteroseptal, apical, and inferior walls do not demonstrate significant viability. The septum demonstrates mild viability. The remaining segments are viable (predominantly the lateral wall only). Discussed with interventional card Dr. Helms and opted to manage medically. Since there has been a decline in LVEF to 225, it was decided to repeat Cardiac viability to see if there were any areas that could be revascularized. [de-identified] : 1/25/23 doppler of legs with no DVT bilaterally  [de-identified] : 9/2022 RHC: RA 15, PCWP 31, PA sat 59%, CO 3.63, CI 1.70, PVR 2.76.\par 9/2022:  s/p R/LHC with IABP support which showed  mid %, LAD 70%, D1 70% and RA 15,\par PCWP 31, PA sat 59%, CO 3.63, CI 1.70, PVR 2.76; medical management\par \par Cardiac Catheterization (05.11.22 @ 15:16) >\par Intra-aortic Balloon Pump\par An intra-aortic balloon pump was inserted.\par Diagnostic Findings:\par Coronary Angiography\par The coronary circulation is right dominant.\par LM\par Left main artery: Angiography shows no disease.\par LAD\par Proximal left anterior descending: There is a 70 % stenosis. First\par diagonal: There is a 70 % stenosis.\par Patient: ELYSE MALAVE MRN: [id number]\par Study Date: 05/11/2022 03:16 PM Page 1 of 4\par CX\par Circumflex: Angiography shows no disease.\par RCA\par Mid right coronary artery: There is a 100 % stenosis.\par Exam Start Time: 03:16 PM\par Exam End Time: 03:53 PM\par Exam Duration: 37 min\par Patient: ELYSE MALAVE MRN: [id number]\par Study Date: 05/11/2022 03:16 PM Page 2 of 4\par Hemodynamic Pressures:\par Phase Location [mmHg] [mmHg]\par [mmHg] HR\par Baseline LV s 115\par ed 36 88\par Baseline Ao s 126 d 85\par m 102 103\par Baseline RV s 50\par ed 17 81\par Baseline PA s 56 d 31\par m 41 81\par Baseline PCW a 32 v 34\par m 31 77\par Baseline RA a 20 v 16\par m 15 95\par \par  [de-identified] : PET/CT 1/25/23. Dr. Jaime reviewed results with patient and his wife notable for hypermetabolic HARRY opacity highly suspicious for malignancy, small left pleural effusion. \par \par 1/3/2023- CTA chest showing mildly loculated R pleural effusion and a 1.7cm L upper lobe \par pulmonary nodule

## 2023-01-30 ENCOUNTER — NON-APPOINTMENT (OUTPATIENT)
Age: 78
End: 2023-01-30

## 2023-01-30 ENCOUNTER — OUTPATIENT (OUTPATIENT)
Dept: OUTPATIENT SERVICES | Facility: HOSPITAL | Age: 78
LOS: 1 days | Discharge: HOME | End: 2023-01-30
Payer: MEDICARE

## 2023-01-30 ENCOUNTER — APPOINTMENT (OUTPATIENT)
Dept: RADIATION ONCOLOGY | Facility: CLINIC | Age: 78
End: 2023-01-30
Payer: MEDICARE

## 2023-01-30 VITALS
HEART RATE: 64 BPM | SYSTOLIC BLOOD PRESSURE: 104 MMHG | DIASTOLIC BLOOD PRESSURE: 67 MMHG | HEIGHT: 72 IN | TEMPERATURE: 97.7 F | RESPIRATION RATE: 17 BRPM | WEIGHT: 172 LBS | BODY MASS INDEX: 23.3 KG/M2 | OXYGEN SATURATION: 100 %

## 2023-01-30 DIAGNOSIS — Z98.890 OTHER SPECIFIED POSTPROCEDURAL STATES: Chronic | ICD-10-CM

## 2023-01-30 PROCEDURE — 77263 THER RADIOLOGY TX PLNG CPLX: CPT

## 2023-01-30 PROCEDURE — 99205 OFFICE O/P NEW HI 60 MIN: CPT | Mod: GC,25

## 2023-01-31 ENCOUNTER — NON-APPOINTMENT (OUTPATIENT)
Age: 78
End: 2023-01-31

## 2023-01-31 ENCOUNTER — TRANSCRIPTION ENCOUNTER (OUTPATIENT)
Age: 78
End: 2023-01-31

## 2023-01-31 ENCOUNTER — RESULT REVIEW (OUTPATIENT)
Age: 78
End: 2023-01-31

## 2023-01-31 ENCOUNTER — APPOINTMENT (OUTPATIENT)
Dept: HEMATOLOGY ONCOLOGY | Facility: CLINIC | Age: 78
End: 2023-01-31
Payer: MEDICARE

## 2023-01-31 VITALS
WEIGHT: 173.99 LBS | OXYGEN SATURATION: 99 % | DIASTOLIC BLOOD PRESSURE: 68 MMHG | HEIGHT: 72 IN | BODY MASS INDEX: 23.57 KG/M2 | HEART RATE: 72 BPM | TEMPERATURE: 96.8 F | RESPIRATION RATE: 16 BRPM | SYSTOLIC BLOOD PRESSURE: 102 MMHG

## 2023-01-31 LAB
BASOPHILS # BLD AUTO: 0.04 K/UL — SIGNIFICANT CHANGE UP (ref 0–0.2)
BASOPHILS NFR BLD AUTO: 0.6 % — SIGNIFICANT CHANGE UP (ref 0–2)
EOSINOPHIL # BLD AUTO: 0.09 K/UL — SIGNIFICANT CHANGE UP (ref 0–0.5)
EOSINOPHIL NFR BLD AUTO: 1.3 % — SIGNIFICANT CHANGE UP (ref 0–6)
HCT VFR BLD CALC: 40 % — SIGNIFICANT CHANGE UP (ref 39–50)
HGB BLD-MCNC: 12.7 G/DL — LOW (ref 13–17)
IMM GRANULOCYTES NFR BLD AUTO: 0.3 % — SIGNIFICANT CHANGE UP (ref 0–0.9)
LYMPHOCYTES # BLD AUTO: 1.27 K/UL — SIGNIFICANT CHANGE UP (ref 1–3.3)
LYMPHOCYTES # BLD AUTO: 18.4 % — SIGNIFICANT CHANGE UP (ref 13–44)
MCHC RBC-ENTMCNC: 29.1 PG — SIGNIFICANT CHANGE UP (ref 27–34)
MCHC RBC-ENTMCNC: 31.8 G/DL — LOW (ref 32–36)
MCV RBC AUTO: 91.5 FL — SIGNIFICANT CHANGE UP (ref 80–100)
MONOCYTES # BLD AUTO: 1.14 K/UL — HIGH (ref 0–0.9)
MONOCYTES NFR BLD AUTO: 16.5 % — HIGH (ref 2–14)
NEUTROPHILS # BLD AUTO: 4.34 K/UL — SIGNIFICANT CHANGE UP (ref 1.8–7.4)
NEUTROPHILS NFR BLD AUTO: 62.9 % — SIGNIFICANT CHANGE UP (ref 43–77)
NRBC # BLD: 0 /100 WBCS — SIGNIFICANT CHANGE UP (ref 0–0)
PLATELET # BLD AUTO: 202 K/UL — SIGNIFICANT CHANGE UP (ref 150–400)
RBC # BLD: 4.37 M/UL — SIGNIFICANT CHANGE UP (ref 4.2–5.8)
RBC # FLD: 17.5 % — HIGH (ref 10.3–14.5)
WBC # BLD: 6.9 K/UL — SIGNIFICANT CHANGE UP (ref 3.8–10.5)
WBC # FLD AUTO: 6.9 K/UL — SIGNIFICANT CHANGE UP (ref 3.8–10.5)

## 2023-01-31 PROCEDURE — 99215 OFFICE O/P EST HI 40 MIN: CPT

## 2023-01-31 NOTE — HISTORY OF PRESENT ILLNESS
[FreeTextEntry1] : This is a 78 y/o M with pmhx of Depression, bipolar, BPH, COVID Winter 2021(recovered), hx of STEMI and cardiogenic shock in may 2022, presented to the [facility] ED for new onset hemoptysis.  Known R lung\par nodule, suspicious for CA, pending IR biopsy. Patient with recent hospitalization where he had a STEMI 5/2022, found to have 100% occlusion RCA and 70% occlusion LAD, needed CCU for IABP support and then\par weaned off. Viability showing poor salvageability and so he was medically managed. Also during this time, TTE with EF 30%, LV thrombus for which he was started on Coumadin, DAPT. He was to continue Coumadin x 3 months, last dose up until 8/26 for which he was compliant.He was hospitalized at [facility] with hemoptysis . He underwent echo 8/25/22 that showed a cardiomyopathy and resolution of the left ventricular thrombus. I coordinated care with the Hospitalist service and warfarin was discontinued. He was discharged on aspirin alone.  He comes today for clarification of  his overall cardiovascular risk prior to a planned biopsy and surgical excision of a lung mass. He was advised to undergo a complete cardiac evaluation. He denies chest pains shortness of breath or loss of consciousnes.\par \par 1/24/2023\karlene oJhn is seen today after prolonged hospitalization we reinstituted heart failure meds in the setting of possible metastatic carcinoma.  Concern was raised regarding edema and possible thrombosis\par \par

## 2023-01-31 NOTE — ASSESSMENT
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  Intraoperative course complicated by cardiogenic shock.  PD-L1 () TPS 0%. ALK negative.  EGFR mutation analysis–+ EGFR mutation ([id number], CTG>CGG).\par \par Per NCCN guidelines, may consider observation with expectant surveillance or chemotherapy for high risk patients and osimertinib (if EGFR exon 19 deletion or L858R).  Examples of high risk features may include poorly differentiated tumors, vascular invasion, wedge resection, tumors greater than 4 cm, visceral pleural involvement or unknown lymph node status–these features do not apply in this case-in light of this and patient's comorbidities, favored expectant surveillance approach. \par Had discussed the EGFR mutation with potential implications in the future with treatment available if needed.\par \par 1/3/2023–CT angiogram of the chest–negative for PE.  Mildly increased moderate loculated right pleural effusion since 12/27/2022.  Indeterminant 1.7 cm apical posterior left upper lobe nodule, enlarged since 7/2022 (4 mm).\par \par 1/25/2023–PET/CT scan–hypermetabolic left upper lobe opacity highly suspicious for malignancy.  Small left pleural effusion.  No hypermetabolic hilar or mediastinal nodes.  Postsurgical changes in the right upper lung field.  No suspicious FDG avid lesion at the surgical site.  Right pleural effusion slightly decreased from the recent CT chest.  Contiguous foci of activity localizing to the vertebral ends of the left 10th-12th ribs with a new fracture in the 11th rib since the recent CT angiogram of the chest.  Pattern of abnormalities compatible with traumatic injury, however, in the presence of a hypermetabolic left upper lobe mass, need to follow closely.\par \par 1/9/2023-S/P Right thoracentesis, 850 ml fluid was drained. Path revealed negative for malignant cells. \par \par Now with enlarging left lung lesion suggestive of neoplastic disease–?second primary vs. met. lesion.  Patient saw thoracic surgery and was deemed not to be a surgical candidate in light of comorbidities.  He was referred to radiation oncology for radiation therapy-planning in progress.  Discussed potential role for systemic therapy–note +EGFR mutation with right-sided tumor (?may consider osimertinib post RT-sending liquid biopsy for f/u molecular testing).\par \par #2) h/o anemia/thrombocytosis-suspect antecedent surgery contributed. h/o Intermittent mild epistaxis may contribute. Decreased iron sat. noted-PO iron supplement. Interval F/U lab work to be done. Should hematologic scenario worsen, can consider further evaluation.\par \par Patient was given the opportunity to ask questions.  His questions have been answered to his apparent satisfaction at this time.  He concurs with plans of care.\par

## 2023-01-31 NOTE — ASSESSMENT
[FreeTextEntry1] : Without evidence for recent infarction overt heart failure or unstable angina and based upon satisfactory clinical exam, Mr Becerra  may undergo planned low risk needle biopsy at an  ASA class III anesthesia risk. Without evidence for recent infarction overt heart failure or unstable angina and based upon satisfactory clinical exam, Mr Becerra  may undergo planned high risk VATS RU Lobectomy at an  ASA class III anesthesia risk. We discussed recent myocardial event which poses increased risk along with severe left ventricular dysfunction and advanced coronary disease which both impose increased cardiovascular risk for high risk surgery , however the benefits of a diagnostic procedure along with surgical intervention outweigh the risk of the procedure given the concern over an advancing carcinoma now with hemoptysis.  Mr Becerra understands inherent risks and wishes to proceed. All routes remain hazardous. \par \par medication management for an ischemic cardiomyopathy\par Aspirin is held as of 9/9/22 , 5 days prior to a needle biopsy and resumed when able from an IR standpoint. Would consider continuing aspirin 81 mg post needle biopsy for planned VATS if OK from a surgical standpoint. Furosemide is continued at 40 mg per day and would continue atorvastatin 80 mg metoprolol succinate 100 mg and consider heart failure meds such as ACE inhibitor and spironolactone and Farxiga when stabilized surgically and hemodynamically. A defibrillator may also need to be considered if ejection fraction less than 30% after GDMT for 40 days.\par \par This represents a high amount of medical decision making based upon two or more more chronic illnesses  and perioperative management of  anticoagulants.\par \par discussed with wife at bedside dr Padilla and Teddy\par \par Addendum\par 9/17/2022\par Recent biopsy demonstrates adenocarcinoma\par \par Addendum 1/24/2023\par Sonogram rule out DVT is negative.  Discussed repeat echo.  I discussed pros and cons of dual antiplatelet therapy currently on aspirin Plavix would continue for now increased risk of bleeding discussed\par \par Medical necessity there is a high risk encounter based upon concern for deep vein thrombosis.  Along with possible need for ICD

## 2023-01-31 NOTE — CARDIOLOGY SUMMARY
[___] : [unfilled] [LVEF ___%] : LVEF [unfilled]% [de-identified] : 5/11/2022 normal sinus rhythm anteroseptal pattern anterior ST segment elevation \par 5/27/22 anterior mi AI lafb rsr pac [de-identified] : 5/20/22 EF .3  LVT 1x1.3 cm decreased RV function RV Enlargement MARCOS \par 8/25/2022 cardiomyopathy with resolution of a left ventricular thrombus [de-identified] : minimal viability in infarcted territories [de-identified] : 5/11/22 lad .7 diag .7 RCA 1.00 circ normal

## 2023-01-31 NOTE — HISTORY OF PRESENT ILLNESS
[Disease: _____________________] : Disease: [unfilled] [T: ___] : T[unfilled] [N: ___] : N[unfilled] [AJCC Stage: ____] : AJCC Stage: [unfilled] [de-identified] : 77 year old male, never smoker, w/ hx of bipolar, BPH, SCC of leg, CHF, CAD, hospitalized on May 11-17/2022 for acute MI s/p cardiac Cath with no intervention (LHC showed 100% occlusion for right coronary artery and 70% occlusion of left anterior descending artery. The arteries were not intervened upon due to viability study showing no viability in the areas supplied by the occluded arteries), cardiogenic shock, found to have new 1x 1.3cm LV thrombus on Plavix and Coumadin (Coumadin completed on 08/26/2022), who f/u with cardiologist and c/o SOB, CXR showed RUL nodule.\par \par 7/11/2022-CT chest-3.5 cm part solid mass is noted in the right upper lobe as described above. Primary consideration is lung carcinoma.\par \par 7/28/2022–PET/CT scan–FDG avid partly solid right upper lung nodule suspicious for malignancy.  Small bilateral pleural effusion with minimal FDG avidity, nonspecific.  Nonspecific mildly FDG avid low-attenuation left adrenal nodule, possibly an adenoma.  Heterogeneous FDG activity in the soft tissue associated with surgical clips overlying the symphysis pubis, probably related from prior procedure.\par \par 9/14/2022–CT-guided biopsy of right upper lung mass–positive for malignant cells, adenocarcinoma.  PD-L1 () TPS 0%. ALK negative.  EGFR mutation analysis–+ EGFR mutation ([id number], CTG>CGG).\par \par 9/23/2022–Status post right VATS-right upper lobectomy-pathology revealed adenocarcinoma, acinar predominant, margin negative.  One level 10 lymph node excised and 5 interlobar lymph nodes negative for carcinoma.  Right pleural fluid negative for malignant cells.\par Intraoperative course complicated by cardiogenic shock requiring inotropic support and diuretics.\par \par 10/21/2020 2-2 cm lobulated opacity in the left upper lobe, new compared to prior exam.\par \par 1/3/2023–CT angiogram of the chest–negative for PE.  Mildly increased moderate loculated right pleural effusion since 12/27/2022.  Indeterminant 1.7 cm apical posterior left upper lobe nodule, enlarged since 7/2022 (4 mm).\par \par 1/25/2023–PET/CT scan–hypermetabolic left upper lobe opacity highly suspicious for malignancy.  Small left pleural effusion.  No hypermetabolic hilar or mediastinal nodes.  Postsurgical changes in the right upper lung field.  No suspicious FDG avid lesion at the surgical site.  Right pleural effusion slightly decreased from the recent CT chest.  Contiguous foci of activity localizing to the vertebral ends of the left 10th-12th ribs with a new fracture in the 11th rib since the recent CT angiogram of the chest.  Pattern of abnormalities compatible with traumatic injury, however, in the presence of a hypermetabolic left upper lobe mass, need to follow closely.\par \par 1/9/2023-S/P Right thoracentesis, 850 ml fluid was drained. Path revealed negative for malignant cells.  [de-identified] : Moderately differentiated invasive acinar adenocarcinoma [de-identified] : 9/2022-PD-L1 () TPS 0%. ALK negative.  EGFR mutation analysis–+ EGFR mutation ([id number], CTG>CGG). [de-identified] : No SOB at rest. Has seen Dr. Padilla in thoracic surgery follow-up–he does not feel that he is a surgical candidate and has referred him to radiation oncology for consideration of radiation for his growing left lung lesion.\par Saw RT MD yesterday-recommending treatment QOD x 4-5 treatments-starts simulation tomorrow.\par Very anxious. No current complaints of chest pain; no hemoptysis; no fevers.  No neurologic complaints.  No complaints of bone pain.  No GI complaints.\par Sees Dr. Yu regularly (Psych). \par Remains on Plavix.\par Postponing going to his place in [place] for now.\par \par Has had COVID vaccines (Moderna) x 4.

## 2023-01-31 NOTE — CONSULT LETTER
[Dear  ___] : Dear  [unfilled], [Courtesy Letter:] : I had the pleasure of seeing your patient, [unfilled], in my office today. [Please see my note below.] : Please see my note below. [Consult Closing:] : Thank you very much for allowing me to participate in the care of this patient.  If you have any questions, please do not hesitate to contact me. [Sincerely,] : Sincerely, [DrAnastacio  ___] : Dr. BLAKE [DrAnastacio ___] : Dr. BLAKE [FreeTextEntry3] : Mayelin Burden MD

## 2023-02-01 ENCOUNTER — NON-APPOINTMENT (OUTPATIENT)
Age: 78
End: 2023-02-01

## 2023-02-01 ENCOUNTER — APPOINTMENT (OUTPATIENT)
Dept: MRI IMAGING | Facility: CLINIC | Age: 78
End: 2023-02-01
Payer: MEDICARE

## 2023-02-01 ENCOUNTER — OUTPATIENT (OUTPATIENT)
Dept: OUTPATIENT SERVICES | Facility: HOSPITAL | Age: 78
LOS: 1 days | End: 2023-02-01
Payer: MEDICARE

## 2023-02-01 DIAGNOSIS — Z98.890 OTHER SPECIFIED POSTPROCEDURAL STATES: Chronic | ICD-10-CM

## 2023-02-01 DIAGNOSIS — C34.91 MALIGNANT NEOPLASM OF UNSPECIFIED PART OF RIGHT BRONCHUS OR LUNG: ICD-10-CM

## 2023-02-01 PROCEDURE — 77290 THER RAD SIMULAJ FIELD CPLX: CPT | Mod: 26

## 2023-02-01 PROCEDURE — 70553 MRI BRAIN STEM W/O & W/DYE: CPT

## 2023-02-01 PROCEDURE — A9585: CPT

## 2023-02-01 PROCEDURE — 77333 RADIATION TREATMENT AID(S): CPT | Mod: 26,59

## 2023-02-01 PROCEDURE — 70553 MRI BRAIN STEM W/O & W/DYE: CPT | Mod: 26,MH

## 2023-02-01 PROCEDURE — 77334 RADIATION TREATMENT AID(S): CPT | Mod: 26

## 2023-02-03 NOTE — VITALS
[Maximal Pain Intensity: 0/10] : 0/10 [Least Pain Intensity: 0/10] : 0/10 [80: Normal activity with effort; some signs or symptoms of disease.] : 80: Normal activity with effort; some signs or symptoms of disease.  [Date: ____________] : Patient's last distress assessment performed on [unfilled]. [6 - Distress Level] : Distress Level: 6 [Referred Patient  to social work for follow-up] : Patient was referred to social work for follow-up [Patient given social work contact information and resource sheet] : Patient was given social work contact information and resource sheet [FreeTextEntry7] : Patient with current stressors re: diagnosis as well recent brother passing.

## 2023-02-03 NOTE — PHYSICAL EXAM
[Outer Ear] : the ears and nose were normal in appearance [Hearing Threshold Finger Rub Not Pershing] : hearing was normal [Examination Of The Oral Cavity] : the lips and gums were normal [Normal Oral Cavity] : oral cavity was normal [] : no respiratory distress [Exaggerated Use Of Accessory Muscles For Inspiration] : no accessory muscle use [Auscultation Breath Sounds / Voice Sounds] : lungs were clear to auscultation bilaterally [Abdomen Soft] : soft [Nondistended] : nondistended [Abdomen Tenderness] : non-tender [Normal] : oriented to person, place and time, the affect was normal, the mood was normal and not anxious [de-identified] : poor inspiratory effort

## 2023-02-03 NOTE — HISTORY OF PRESENT ILLNESS
[FreeTextEntry1] : Mr. Becerra is a 77 year old man with history of lung cancer. 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 He has extensive cardiac comorbidities and intraoperative course was complicated by cardiogenic shock. \par \par Subsequent imaging detected what appears to be a contralateral new primary lung cancer vs. contralateral metastasis -- 1.7cm apical posterior HARRY nodule detected on CTA 1/3/23, confirmed to be hypermetabolic on PET CT of 1/25/23. No pathologic diagnosis of the HARRY nodule has been made. At time of presentation he is physically asymptomatic apart from OGLESBY, although anxious about his diagnosis. He has been deemed a poor candidate for further surgery. \par \par Referred for consideration of definitive radiation therapy for new clinical stage I primary cancer of the left lung vs. small contralateral oligometastasis.

## 2023-02-08 LAB
CULTURE RESULTS: SIGNIFICANT CHANGE UP
SPECIMEN SOURCE: SIGNIFICANT CHANGE UP

## 2023-02-15 ENCOUNTER — EMERGENCY (EMERGENCY)
Facility: HOSPITAL | Age: 78
LOS: 1 days | Discharge: HOME | End: 2023-02-15
Attending: INTERNAL MEDICINE | Admitting: EMERGENCY MEDICINE
Payer: MEDICARE

## 2023-02-15 VITALS
RESPIRATION RATE: 18 BRPM | WEIGHT: 175.05 LBS | DIASTOLIC BLOOD PRESSURE: 58 MMHG | HEIGHT: 72 IN | TEMPERATURE: 98 F | OXYGEN SATURATION: 99 % | SYSTOLIC BLOOD PRESSURE: 84 MMHG | HEART RATE: 74 BPM

## 2023-02-15 VITALS
RESPIRATION RATE: 18 BRPM | OXYGEN SATURATION: 99 % | DIASTOLIC BLOOD PRESSURE: 60 MMHG | TEMPERATURE: 98 F | HEART RATE: 82 BPM | SYSTOLIC BLOOD PRESSURE: 103 MMHG

## 2023-02-15 DIAGNOSIS — Z98.890 OTHER SPECIFIED POSTPROCEDURAL STATES: Chronic | ICD-10-CM

## 2023-02-15 LAB
ALBUMIN SERPL ELPH-MCNC: 2.7 G/DL — LOW (ref 3.3–5)
ALP SERPL-CCNC: 147 U/L — HIGH (ref 40–120)
ALT FLD-CCNC: 19 U/L — SIGNIFICANT CHANGE UP (ref 10–45)
ANION GAP SERPL CALC-SCNC: 10 MMOL/L — SIGNIFICANT CHANGE UP (ref 5–17)
AST SERPL-CCNC: 40 U/L — SIGNIFICANT CHANGE UP (ref 10–40)
BASOPHILS # BLD AUTO: 0.03 K/UL — SIGNIFICANT CHANGE UP (ref 0–0.2)
BASOPHILS NFR BLD AUTO: 0.5 % — SIGNIFICANT CHANGE UP (ref 0–2)
BILIRUB SERPL-MCNC: 0.9 MG/DL — SIGNIFICANT CHANGE UP (ref 0.2–1.2)
BUN SERPL-MCNC: 45 MG/DL — HIGH (ref 7–23)
CALCIUM SERPL-MCNC: 8.6 MG/DL — SIGNIFICANT CHANGE UP (ref 8.4–10.5)
CHLORIDE SERPL-SCNC: 107 MMOL/L — SIGNIFICANT CHANGE UP (ref 96–108)
CO2 SERPL-SCNC: 22 MMOL/L — SIGNIFICANT CHANGE UP (ref 22–31)
CREAT SERPL-MCNC: 1.44 MG/DL — HIGH (ref 0.5–1.3)
EGFR: 50 ML/MIN/1.73M2 — LOW
EOSINOPHIL # BLD AUTO: 0.1 K/UL — SIGNIFICANT CHANGE UP (ref 0–0.5)
EOSINOPHIL NFR BLD AUTO: 1.7 % — SIGNIFICANT CHANGE UP (ref 0–6)
GLUCOSE SERPL-MCNC: 100 MG/DL — HIGH (ref 70–99)
HCT VFR BLD CALC: 36.1 % — LOW (ref 39–50)
HGB BLD-MCNC: 11.8 G/DL — LOW (ref 13–17)
IMM GRANULOCYTES NFR BLD AUTO: 0.3 % — SIGNIFICANT CHANGE UP (ref 0–0.9)
LYMPHOCYTES # BLD AUTO: 0.74 K/UL — LOW (ref 1–3.3)
LYMPHOCYTES # BLD AUTO: 12.4 % — LOW (ref 13–44)
MCHC RBC-ENTMCNC: 29.5 PG — SIGNIFICANT CHANGE UP (ref 27–34)
MCHC RBC-ENTMCNC: 32.7 GM/DL — SIGNIFICANT CHANGE UP (ref 32–36)
MCV RBC AUTO: 90.3 FL — SIGNIFICANT CHANGE UP (ref 80–100)
MONOCYTES # BLD AUTO: 0.62 K/UL — SIGNIFICANT CHANGE UP (ref 0–0.9)
MONOCYTES NFR BLD AUTO: 10.4 % — SIGNIFICANT CHANGE UP (ref 2–14)
NEUTROPHILS # BLD AUTO: 4.47 K/UL — SIGNIFICANT CHANGE UP (ref 1.8–7.4)
NEUTROPHILS NFR BLD AUTO: 74.7 % — SIGNIFICANT CHANGE UP (ref 43–77)
NRBC # BLD: 0 /100 WBCS — SIGNIFICANT CHANGE UP (ref 0–0)
NT-PROBNP SERPL-SCNC: 4053 PG/ML — HIGH (ref 0–300)
PLATELET # BLD AUTO: 181 K/UL — SIGNIFICANT CHANGE UP (ref 150–400)
POTASSIUM SERPL-MCNC: 4.7 MMOL/L — SIGNIFICANT CHANGE UP (ref 3.5–5.3)
POTASSIUM SERPL-SCNC: 4.7 MMOL/L — SIGNIFICANT CHANGE UP (ref 3.5–5.3)
PROT SERPL-MCNC: 6.5 G/DL — SIGNIFICANT CHANGE UP (ref 6–8.3)
RBC # BLD: 4 M/UL — LOW (ref 4.2–5.8)
RBC # FLD: 17.9 % — HIGH (ref 10.3–14.5)
SARS-COV-2 RNA SPEC QL NAA+PROBE: SIGNIFICANT CHANGE UP
SODIUM SERPL-SCNC: 139 MMOL/L — SIGNIFICANT CHANGE UP (ref 135–145)
TROPONIN I, HIGH SENSITIVITY RESULT: 49.7 NG/L — SIGNIFICANT CHANGE UP
WBC # BLD: 5.98 K/UL — SIGNIFICANT CHANGE UP (ref 3.8–10.5)
WBC # FLD AUTO: 5.98 K/UL — SIGNIFICANT CHANGE UP (ref 3.8–10.5)

## 2023-02-15 PROCEDURE — 99285 EMERGENCY DEPT VISIT HI MDM: CPT | Mod: 25

## 2023-02-15 PROCEDURE — 77334 RADIATION TREATMENT AID(S): CPT | Mod: 26

## 2023-02-15 PROCEDURE — 87635 SARS-COV-2 COVID-19 AMP PRB: CPT

## 2023-02-15 PROCEDURE — 77300 RADIATION THERAPY DOSE PLAN: CPT | Mod: 26

## 2023-02-15 PROCEDURE — 77295 3-D RADIOTHERAPY PLAN: CPT | Mod: 26

## 2023-02-15 PROCEDURE — 71045 X-RAY EXAM CHEST 1 VIEW: CPT

## 2023-02-15 PROCEDURE — 96374 THER/PROPH/DIAG INJ IV PUSH: CPT

## 2023-02-15 PROCEDURE — 80053 COMPREHEN METABOLIC PANEL: CPT

## 2023-02-15 PROCEDURE — 71045 X-RAY EXAM CHEST 1 VIEW: CPT | Mod: 26

## 2023-02-15 PROCEDURE — 84484 ASSAY OF TROPONIN QUANT: CPT

## 2023-02-15 PROCEDURE — 93010 ELECTROCARDIOGRAM REPORT: CPT

## 2023-02-15 PROCEDURE — 99285 EMERGENCY DEPT VISIT HI MDM: CPT

## 2023-02-15 PROCEDURE — 83880 ASSAY OF NATRIURETIC PEPTIDE: CPT

## 2023-02-15 PROCEDURE — 77293 RESPIRATOR MOTION MGMT SIMUL: CPT | Mod: 26

## 2023-02-15 PROCEDURE — 93005 ELECTROCARDIOGRAM TRACING: CPT

## 2023-02-15 PROCEDURE — 36415 COLL VENOUS BLD VENIPUNCTURE: CPT

## 2023-02-15 PROCEDURE — 85025 COMPLETE CBC W/AUTO DIFF WBC: CPT

## 2023-02-15 RX ORDER — FUROSEMIDE 40 MG
40 TABLET ORAL ONCE
Refills: 0 | Status: COMPLETED | OUTPATIENT
Start: 2023-02-15 | End: 2023-02-15

## 2023-02-15 RX ADMIN — Medication 40 MILLIGRAM(S): at 17:50

## 2023-02-15 NOTE — ED PROVIDER NOTE - CLINICAL SUMMARY MEDICAL DECISION MAKING FREE TEXT BOX
77-year-old male with a past history of coronary artery disease CHF adenocarcinoma of the lung came to the emergency room with chief complaint of bleeding from the left nostril started today has been bleeding for 4 hours and the other complaint was of shortness of breath and wheezing and patient is on a salt restriction and fluid restriction for that but stated that the shortness of breath is getting worse for like 5 daPatient left nostril was packed with a Rhino Rocket the bleeding stopped the labs were done the proBNP is 4400 chest x-ray is shows that there is a right lower lobe haziness patient was given Lasix 40 mg IV with relief 77-year-old male with a past history of coronary artery disease CHF adenocarcinoma of the lung came to the emergency room with chief complaint of bleeding from the left nostril started today has been bleeding for 4 hours and the other complaint was of shortness of breath and wheezing and patient is on a salt restriction and fluid restriction for that but stated that the shortness of breath is getting worse for like 5 daPatient left nostril was packed with a Rhino Rocket the bleeding stopped the labs were done the proBNP is 4400 chest x-ray is shows that there is a right lower lobe haziness patient was given Lasix 40 mg IV with relief  Patient was seen by Dr. Chapman the internist who recommended the patient does not need to be hospitalized patient has a good follow-up with the heart failure team

## 2023-02-15 NOTE — ED PROVIDER NOTE - CARE PROVIDER_API CALL
Collin Baltazar  OTOLARYNGOLOGY  [address] [place] [id number]  Phone: [phone]  Fax: [phone]  Follow Up Time:

## 2023-02-15 NOTE — ED PROVIDER NOTE - PATIENT PORTAL LINK FT
You can access the FollowMyHealth Patient Portal offered by [facility] by registering at the following website: [email or web address]. By joining [facility]’s FollowMyHealth portal, you will also be able to view your health information using other applications (apps) compatible with our system.

## 2023-02-15 NOTE — ED PROVIDER NOTE - CARE PLAN
Principal Discharge DX:	Epistaxis  Secondary Diagnosis:	CHF exacerbation   1 Principal Discharge DX:	Epistaxis  Secondary Diagnosis:	Chronic CHF

## 2023-02-15 NOTE — ED PROVIDER NOTE - PHYSICAL EXAMINATION
General:     NAD, well-nourished, well-appearing  Head:     NC/AT, EOMI, oral mucosa dry   HEENT Positive active bleeding from the left nostril  Neck:     trachea midline  Lungs:     Bilateral crackles  CVS:     S1S2, RRR, no m/g/r  Abd:     +BS, s/nt/nd, no organomegaly  Ext:    2+ radial and pedal pulses, no c/c/e  Neuro: AAOx3, no sensory/motor deficits

## 2023-02-15 NOTE — ED PROVIDER NOTE - OBJECTIVE STATEMENT
77-year-old male with a past history of coronary artery disease CHF adenocarcinoma of the lung came to the emergency room with chief complaint of bleeding from the left nostril started today has been bleeding for 4 hours and the other complaint was of shortness of breath and wheezing and patient is on a salt restriction and fluid restriction for that but stated that the shortness of breath is getting worse for like 5 days

## 2023-02-15 NOTE — ED ADULT NURSE REASSESSMENT NOTE - NS ED NURSE REASSESS COMMENT FT1
Hospitalist at bedside. Dinner tray provided to patient. No nasal bleeding at this time. Wife remains at bedside. Will continue to monitor.

## 2023-02-15 NOTE — ED ADULT NURSE NOTE - OBJECTIVE STATEMENT
Pt presents to ED from home for left sided epistaxis. As per wife, pt has been bleeding for 2 hours. They went to the med station and was sent to ED for management of bleeding. Left nasal awa rocket placed by Dr Singer, bleeding controlled at this time. Pt presents hypotensive, as per wife pt's blood pressure runs approximately 90/60. Pt placed on cardiac monitor. Call bell within reach.

## 2023-02-21 ENCOUNTER — NON-APPOINTMENT (OUTPATIENT)
Age: 78
End: 2023-02-21

## 2023-02-21 RX ORDER — FUROSEMIDE 20 MG/1
20 TABLET ORAL
Qty: 30 | Refills: 3 | Status: DISCONTINUED | COMMUNITY
Start: 2022-06-06 | End: 2023-02-21

## 2023-02-22 ENCOUNTER — NON-APPOINTMENT (OUTPATIENT)
Age: 78
End: 2023-02-22

## 2023-02-23 ENCOUNTER — NON-APPOINTMENT (OUTPATIENT)
Age: 78
End: 2023-02-23

## 2023-02-23 VITALS
TEMPERATURE: 96.7 F | WEIGHT: 169 LBS | RESPIRATION RATE: 17 BRPM | SYSTOLIC BLOOD PRESSURE: 96 MMHG | HEART RATE: 74 BPM | BODY MASS INDEX: 22.92 KG/M2 | DIASTOLIC BLOOD PRESSURE: 54 MMHG | OXYGEN SATURATION: 97 %

## 2023-02-23 NOTE — CHART NOTE - NSCHARTNOTEFT_GEN_A_CORE
SW called pt to discuss and assist with follow up care.  Pt is a 78 y/o male presented to ED for epistaxis.  Pt reports that pt is feeling better and has not seen primary or Otolaryngology and is planning to schedule one soon, declined SW assistance.  Pt did not mention any other concerns, encouraged pt to call SW if further assistance is needed.

## 2023-03-01 VITALS
OXYGEN SATURATION: 98 % | BODY MASS INDEX: 22.89 KG/M2 | WEIGHT: 169 LBS | HEART RATE: 78 BPM | TEMPERATURE: 95.18 F | HEIGHT: 72 IN | RESPIRATION RATE: 17 BRPM | DIASTOLIC BLOOD PRESSURE: 65 MMHG | SYSTOLIC BLOOD PRESSURE: 99 MMHG

## 2023-03-01 PROCEDURE — 77435 SBRT MANAGEMENT: CPT

## 2023-03-06 ENCOUNTER — LABORATORY RESULT (OUTPATIENT)
Age: 78
End: 2023-03-06

## 2023-03-06 ENCOUNTER — APPOINTMENT (OUTPATIENT)
Dept: HEMATOLOGY ONCOLOGY | Facility: CLINIC | Age: 78
End: 2023-03-06

## 2023-03-06 ENCOUNTER — RESULT REVIEW (OUTPATIENT)
Age: 78
End: 2023-03-06

## 2023-03-06 ENCOUNTER — APPOINTMENT (OUTPATIENT)
Dept: CARDIOLOGY | Facility: CLINIC | Age: 78
End: 2023-03-06
Payer: MEDICARE

## 2023-03-06 VITALS
TEMPERATURE: 97.7 F | HEART RATE: 63 BPM | WEIGHT: 168 LBS | DIASTOLIC BLOOD PRESSURE: 53 MMHG | BODY MASS INDEX: 22.75 KG/M2 | HEIGHT: 72 IN | SYSTOLIC BLOOD PRESSURE: 86 MMHG | OXYGEN SATURATION: 100 %

## 2023-03-06 LAB
BASOPHILS # BLD AUTO: 0.04 K/UL — SIGNIFICANT CHANGE UP (ref 0–0.2)
BASOPHILS NFR BLD AUTO: 0.9 % — SIGNIFICANT CHANGE UP (ref 0–2)
EOSINOPHIL # BLD AUTO: 0.17 K/UL — SIGNIFICANT CHANGE UP (ref 0–0.5)
EOSINOPHIL NFR BLD AUTO: 4 % — SIGNIFICANT CHANGE UP (ref 0–6)
HCT VFR BLD CALC: 37.6 % — LOW (ref 39–50)
HGB BLD-MCNC: 12 G/DL — LOW (ref 13–17)
IMM GRANULOCYTES NFR BLD AUTO: 0.2 % — SIGNIFICANT CHANGE UP (ref 0–0.9)
LYMPHOCYTES # BLD AUTO: 0.86 K/UL — LOW (ref 1–3.3)
LYMPHOCYTES # BLD AUTO: 20.2 % — SIGNIFICANT CHANGE UP (ref 13–44)
MCHC RBC-ENTMCNC: 28.7 PG — SIGNIFICANT CHANGE UP (ref 27–34)
MCHC RBC-ENTMCNC: 31.9 G/DL — LOW (ref 32–36)
MCV RBC AUTO: 90 FL — SIGNIFICANT CHANGE UP (ref 80–100)
MONOCYTES # BLD AUTO: 0.58 K/UL — SIGNIFICANT CHANGE UP (ref 0–0.9)
MONOCYTES NFR BLD AUTO: 13.6 % — SIGNIFICANT CHANGE UP (ref 2–14)
NEUTROPHILS # BLD AUTO: 2.59 K/UL — SIGNIFICANT CHANGE UP (ref 1.8–7.4)
NEUTROPHILS NFR BLD AUTO: 61.1 % — SIGNIFICANT CHANGE UP (ref 43–77)
NRBC # BLD: 0 /100 WBCS — SIGNIFICANT CHANGE UP (ref 0–0)
PLATELET # BLD AUTO: 165 K/UL — SIGNIFICANT CHANGE UP (ref 150–400)
RBC # BLD: 4.18 M/UL — LOW (ref 4.2–5.8)
RBC # FLD: 18.2 % — HIGH (ref 10.3–14.5)
RETICS #: 47.2 K/UL — SIGNIFICANT CHANGE UP (ref 25–125)
RETICS/RBC NFR: 1.1 % — SIGNIFICANT CHANGE UP (ref 0.5–2.5)
WBC # BLD: 4.25 K/UL — SIGNIFICANT CHANGE UP (ref 3.8–10.5)
WBC # FLD AUTO: 4.25 K/UL — SIGNIFICANT CHANGE UP (ref 3.8–10.5)

## 2023-03-06 PROCEDURE — 93000 ELECTROCARDIOGRAM COMPLETE: CPT

## 2023-03-06 PROCEDURE — 99214 OFFICE O/P EST MOD 30 MIN: CPT

## 2023-03-06 NOTE — HISTORY OF PRESENT ILLNESS
[FreeTextEntry1] : 77 year old Male with PMH of bipolar disorder, CAD/STEMI (5/2022) s/p R/LHC with IABP support which showed  mid %, LAD 70%, D1 70% and RA 15, PCWP 31, PA sat 59%, CO 3.63, CI 1.70, PVR 2.76; medical management recommended; TTE showed LV thrombus treated with Coumadin (completed 8/26/22) , HFrEF (EF 34%, LVIDd6.1, moderate TR and severe PH with repeat 11/28/22 with decline in LVEF 22%), RUL mass s/p R. VATS (9/23/2022) c/b cardiogenic shock requiring inotropic support and diuretics. Pertinent labs:AST 2768, ALT 1653 RHC: RA 15, PCWP 31, PA sat 59%, CO 3.63, CI 1.70, PVR.2.7. S/p thoracentesis 1/9/23. Last admission 1/3-/23-1/10/23 secondary to ADHF.  S/P RT 2/023. Pt is here for a follow up . Accompanied by his wife. Card:Dr. Walden.\par \par Course in hospital: presented 1/3/23 worsening shortness of breath with exertion x 2 weeks, orthopnea, paroxysmal  nocturnal dyspnea, found to have elevated pro-BNP, +JVD, +entero/rhinovirus w/  CTA chest showing mildly loculated R pleural effusion and a 1.7cm L upper lobe  pulmonary nodule. Patient was seen by heart failure and was diuresed with  clinical improvement. He was also seen by pulmonology for possible thoracentesis, which he underwent on 1/9 after Plavix was held for 5d. Fluid  studies were consistent with a transudative process likely from heart failure exacerbation. The patient's GDMT for heart failure was optimized and he was started on Farxiga and Entresto. During this admission, the patient was also seen by psych due to worsening depression; no changes were made. \par \par Pt  is here for a follow up today with his wife. Since last visit, he completed RT therapy for hypermetabolic HARRY opacity highly suspicious for malignancy, small left pleural effusion on PET/CT 1/25/23. He has been feeling well. \par  He was taking bumex 2 mg daily but has not taken in 4 days due to weight going down to 160 lbs and B/P 86-90 range with no associated dizziness/LH.  He is due to repeat CT scan and follow up with oncology 5/2023. Plans on going to [place] for 3 weeks 3/25/23. \par \par States D/c weight was approx 173 lbs and he is 160 lbs at home from prior 173 lbs at home with B/P at home  and is 86/53 in office today with  HR 63 bpm. \par \par Prior testing with doppler of legs 1/25/23 with no DVT bilaterally and a TTE with results pending. Last TTE during admission 1/4/23 with LVEF 27%, LVIDD 6.5 cm , no LV thrombus, severe LV systolic dysfunction, decreased RV systolic dysfunction, severe MR, mild TR. \par \par Currently, he is able to walk for 25-30 minutes and can climb a flight of stairs without dyspnea. sleeps with 2 pillows with no orthopnea. He is adhering to a low salt diet and is drinking less than 2 liters of fluid per day\par \par He denies chest pain, palpitations, dizziness/LH and syncope and he does not have an ICD. Seen by EP last vist and will consider ICD placement after he follows with hem/oncology for pulmonary nodule. \par \par Of note, he is  , non smoker, no ETOH, + family history of CAD in brothers; one with ICD and 3 V CABG, other with CHF, stents\par \par \par Patient name: ELYSE MALAVE\par YOB: 1945   Age: 77 (M)   MR#: [id number]\par Study Date: 11/28/2022\par Location: O/PSonographer: Venus Mcpherson RDCS\par Study quality: Technically good\par Referring Physician: VIPUL JEROME NP\par Blood Pressure: 98/62 mmHg\par Height: 183 cm\par Weight: 79 kg\par BSA: 2 m2\par ------------------------------------------------------------------------\par PROCEDURE: Transthoracic echocardiogram with 2-D, M-Mode\par and complete spectral and color flow Doppler.\par INDICATION: Heart failure, unspecified (I50.9)\par ------------------------------------------------------------------------\par DIMENSIONS:\par Dimensions:     Normal Values:\par LA:     4.6 cm    2.0 - 4.0 cm\par Ao:     3.5 cm    2.0 - 3.8 cm\par SEPTUM: 0.8 cm    0.6 - 1.2 cm\par PWT:    0.8 cm    0.6 - 1.1 cm\par LVIDd:  6.1 cm    3.0 - 5.6 cm\par LVIDs:  5.5 cm    1.8 - 4.0 cm\par Derived Variables:\par LVMI: 95 g/m2\par RWT: 0.26\par Fractional short: 10 %\par Ejection Fraction (Pizarro Rule): 22 %\par ------------------------------------------------------------------------\par OBSERVATIONS:\par Mitral Valve: Mitral annular calcification, otherwise\par normal mitral valve. Moderate mitral regurgitation.\par Aortic Root: Normal aortic root.\par Aortic Valve: Calcified trileaflet aortic valve with normal\par opening. Mild aortic regurgitation. \par Left Atrium: Severely dilated left atrium.  LA volume index\par = 49 cc/m2.\par Left Ventricle: Severe global left ventricular systolic\par dysfunction. Mild left ventricular enlargement. Moderate\par diastolic dysfunction (Stage II).\par Right Heart: Moderate right atrial enlargement. Right\par ventricular enlargement with decreased right ventricular\par systolic function. Normal tricuspid valve.  Mild-moderate\par tricuspid regurgitation. Normal pulmonic valve.  Mild\par pulmonic regurgitation.\par Pericardium/PleuraNormal pericardium with no pericardial\par effusion.\par Hemodynamic: Estimated right ventricular systolic pressure\par equals 62 mm Hg, assuming right atrial pressure equals 10\par mm Hg, consistent with severe pulmonary hypertension.\par ------------------------------------------------------------------------\par CONCLUSIONS:\par 1. Mitral annular calcification, otherwise normal mitral\par valve. Moderate mitral regurgitation.\par 2. Calcified trileaflet aortic valve with normal opening.\par Mild aortic regurgitation. \par 3. Severely dilated left atrium.  LA volume index = 49\par cc/m2.\par 4. Mild left ventricular enlargement.\par 5. Severe global left ventricular systolic dysfunction.\par 6. Moderate diastolic dysfunction (Stage II).\par 7. Moderate right atrial enlargement.\par 8. Right ventricular enlargement with decreased right\par ventricular systolic function.\par 9. Estimated right ventricular systolic pressure equals 62\par mm Hg, assuming right atrial pressure equals 10 mm Hg,\par consistent with severe pulmonary hypertension.\par *** Compared with echocardiogram of 9/24/2022, left\par ventricular systolic function has deteriorated.  The\par estimated pulmonary artery systolic pressure has decreased.\par ------------------------------------------------------------------------\par Confirmed on  11/28/2022 - 13:32:27 by Nick Mead M.D.,\par ASHI[facility]\par ------------------------------------------------------------------------\par \par \par Echocardiogram:\par Transthoracic Echocardiogram (09.24.22 @ 14:33)\par DIMENSIONS:\par Dimensions: Normal Values:\par LA: 4.1 cm 2.0 - 4.0 cm\par Ao: 3.7 cm 2.0 - 3.8 cm\par SEPTUM: 1.2 cm 0.6 - 1.2 cm\par PWT: 1.2 cm 0.6 - 1.1 cm\par LVIDd: 6.1 cm 3.0 - 5.6 cm\par LVIDs: --- 1.8 - 4.0 cm\par Derived Variables:\par LVMI: 161 g/m2\par RWT: 0.39\par Ejection Fraction (Modified Pizarro Rule): 34 %\par ------------------------------------------------------------------------\par OBSERVATIONS:\par Mitral Valve: Mitral annular calcification, otherwise\par normal mitral valve. Moderate mitral regurgitation.\par Aortic Root: Normal aortic root.\par Aortic Valve: Calcified trileaflet aortic valve with normal\par opening. Mild aortic regurgitation.\par Left Atrium: Normal left atrium. LA volume index = 28\par cc/m2.\par Left Ventricle: Severe global left ventricular systolic\par dysfunction. Severe left ventricular enlargement. Moderate\par diastolic dysfunction (Stage II).\par Right Heart: Normal right atrium. Right ventricular\par enlargement with normal right ventricular systolic\par function. Normal tricuspid valve. Moderate tricuspid\par regurgitation. Normal pulmonic valve. Mild pulmonic\par regurgitation.\par Pericardium/PleuraNormal pericardium with no pericardial\par effusion.\par Hemodynamic: Estimated right ventricular systolic pressure\par equals 82 mm Hg, assuming right atrial pressure equals 10\par mm Hg, consistent with severe pulmonary hypertension.\par ------------------------------------------------------------------------\par CONCLUSIONS:\par 1. Mitral annular calcification, otherwise normal mitral\par valve. Moderate mitral regurgitation.\par 2. Severe left ventricular enlargement.\par 3. Severe global left ventricularsystolic dysfunction.\par 4. Moderate diastolic dysfunction (Stage II).\par 5. Right ventricular enlargement with normal right\par ventricular systolic function.\par 6. Estimated pulmonary artery systolic pressure equals 82\par mm Hg, assuming right atrial pressureequals 10 mm Hg,\par consistent with severe pulmonary hypertension.\par \par  Cardiac Catheterization (05.11.22 @ 15:16) >\par Intra-aortic Balloon Pump\par An intra-aortic balloon pump was inserted.\par Diagnostic Findings:\par Coronary Angiography\par The coronary circulation is right dominant.\par LM\par Left main artery: Angiography shows no disease.\par LAD\par Proximal left anterior descending: There is a 70 % stenosis. First\par diagonal: There is a 70 % stenosis.\par Patient: ELYSE MALAVE MRN: [id number]\par Study Date: 05/11/2022 03:16 PM Page 1 of 4\par CX\par Circumflex: Angiography shows no disease.\par RCA\par Mid right coronary artery: There is a 100 % stenosis.\par Exam Start Time: 03:16 PM\par Exam End Time: 03:53 PM\par Exam Duration: 37 min\par Patient: ELYSE MALAVE MRN: [id number]\par Study Date: 05/11/2022 03:16 PM Page 2 of 4\par Hemodynamic Pressures:\par Phase Location [mmHg] [mmHg]\par [mmHg] HR\par Baseline LV s 115\par ed 36 88\par Baseline Ao s 126 d 85\par m 102 103\par Baseline RV s 50\par ed 17 81\par Baseline PA s 56 d 31\par m 41 81\par Baseline PCW a 32 v 34\par m 31 77\par Baseline RA a 20 v 16\par m 15 95\par \par Oxygen Saturations\par Phase Location Hb Sat pO2\par Content Group\par Baseline AO 14 99\par 19.33 SA\par Baseline PA 14 59\par 11.48 PA\par \par Oxygen Saturation Average, Phase: Baseline\par PV Hb PV Sat PV pO2\par PV Content\par SA Hb 14.40 g/dL SA Sat 98.70 % SA pO2\par SA Content 19.33 %\par PA Hb 14.40 g/dL PA Sat 58.60 % PA pO2\par PA Content 11.48 %\par MV Hb MV Sat MV pO2\par MV Content\par Strokework:\par Phase: Baseline\par LVSW: 34.02 g*m LVSW (index):\par 15.96 g*m/m2\par RVSW: 15.84 g*m RVSW (index):\par 7.43 g*m/m2\par Flow Calculations, Phase: Baseline\par CO 3.63 l/min HR\par O2Insp\par CI 1.70 l/min/m2 PO2\par O2Exp\par SV VO2 285.00 ml/min\par O2(ExAir)\par SVI A-VO2\par Hb 14.40 gm/d\par Shunts:\par Qs 3.63 l/min Qs Index 1.70\par l/min/m2\par Qp 3.63 l/min Qp Index 1.70\par l/min/m2 Qp/Qs\par EPBF EPBF Index\par L-R L-R Index\par R-L R-L Index\par Systemic Resistances, Phase: Baseline\par SVR 1917.35 dyn*s/cm5 TVR\par 2247.93 dyn*s/cm5\par \par Patient: ELYSE MALAVE MRN: [id number]\par Study Date: 05/11/2022 03:16PM Page 3 of 4\par \par \par \par \par SVRI 4086.28 dyn*s*m2/cm5 TVRI 4790.81 dyn*s*m2/cm5\par SVR 23.97 MERCADO TVR\par 28.11 MERCADO\par SVRI 51.09 MERCADO*m2 TVRI\par 59.90 MERCADO*m2\par Pulmonary Resistances:\par .39 dyn*s/cm5 TPR\par 903.58 dyn*s/cm5\par PVRI 469.69 dyn*s*m2/cm5 TPRI\par 1925.72 dyn*s*m2/cm5\par PVR 2.76 MERCADO TPR\par 11.30 MERCADO\par PVRI 5.87 MERCADO*m2 TPRI\par 24.08 MERCADO*m2\par Ratios:\par PVR-SVR 0.11 TPR-TVR\par 0.4\par Shunts:\par venO2 CO\par R-L Shunt R-L Shunt\par Sammy. Method\par L-R Shunt L-R Shunt\par R-L Shunt R-L Shunt\par Sammy. Method\par \par Conscious sedation was administered and monitored by Cardiology\par nursing staff,\par under my direct supervision when applicable.\par \par Electronically signed by Stew Helms MD on 05/11/2022 at 05:17 PM\par \par \par \par \par Cardiac Viability (05.12.22 @ 15:17)\par NUCLEAR FINDINGS:\par The left ventricle was normal in size. The mid to distal\par anterior, mid to distalanteroseptal, apical, and inferior\par walls do not demonstrate significant viability. The\par septum demonstrates mild viability. The remaining segments\par are viable (predominantly the lateral wall only).\par ------------------------------------------------------------------------\par GATED ANALYSIS:\par Rest gated wall motion analysis was performed (LVEF = 29\par %;LVEDV = 205 ml.), revealing markedly reduced LV\par function with regional variation.\par ------------------------------------------------------------------------\par IMPRESSIONS:Abnormal Study\par * The left ventricle was normal in size. The mid to distal\par anterior, mid to distal anteroseptal, apical, and inferior\par walls do not demonstrate significant viability. The\par septum demonstrates mild viability. Theremaining segments\par are viable (predominantly the lateral wall only).\par * Rest gated wall motion analysis was performed (LVEF = 29\par %;LVEDV = 205 ml.), revealing markedly reduced LV\par function with regional variation.\par Conclusion:\par The left ventricle was normal in size. The mid to distal\par anterior, mid to distal anteroseptal, apical, and inferior\par walls do not demonstrate significant viability. The\par septum demonstrates mild viability. The remaining segments\par are viable (predominantly the lateral wall only).\par ------------------------------------------------------------------------\par \par \par \par \par Assessment and Plan:\par - Assessment \par 77 year old Male with PMH of bipolar disorder, CAD/STEMI (5/2022) s/p R/LHC\par with IABP support which showed  mid %, LAD 70%, D1 70% and RA 15,\par PCWP 31, PA sat 59%, CO 3.63, CI 1.70, PVR 2.76; medical management\par recommended; TTE showed LV thrombus treated with Coumadin, HFrEF (EF 34%, LVIDd\par 6.1, moderate TR and severe PH), RUL mass s/p R. VATS (9/23/2022) c/b\par cardiogenic shock requiring inotropic support and diuretics. Pertinent labs:\par AST 2768, ALT 1653 RHC: RA 15, PCWP 31, PA sat 59%, CO 3.63, CI 1.70, PVR\par 2.76.\par 9/28 Dobutamine weaned off and hemodynamically stable\par 9/29 Lactate = 1.3 and VSat = 53.9% CO/CI 3.2/1.5\par BNP decreased to 3,514 AST/ALT decreased to 351/652\par \par \par \par \par \par Problem/Plan - \par Attestation Statements:\par Attestation Statements:\par Attending and PA/NP shared services statement (NON-critical care):\par \par Attending to bill.\par \par I independently performed the documented:\par \par Medical decision making.\par \par \par \par                      \par \par PROCEDURE DATE:  10/01/2022  CXR\par INTERPRETATION:  INDICATION: S/P VATS\par COMPARISON: 9/30/22\par Technique: AP radiograph of the chest\par FINDINGS:\par S/P removal of right IJ line.\par Heart/Vascular: Stable mild cardiomegaly.\par Pulmonary: New, small pleural effusion. Remainder of the lungs are clear. \par No focal infiltrate. No pneumothorax\par Bones: No acute bony finding\par Impression:\par New, small right pleural effusion.\par \par \par \par

## 2023-03-06 NOTE — DISCUSSION/SUMMARY
[Patient] : the patient [___ Week(s)] : in [unfilled] week(s) [FreeTextEntry2] : wife [FreeTextEntry1] : 1. Chronic systolic congestive heart failure. LVEF 27% ( TTE 1/4/23)\par - continue  Entresto 24-26 mg bid,  will not increase to 49-51 mg bid due to low B/P readings\par - continue Farxiga 5 mg dally \par - decrease bumex to 1 mg only as needed for weight gain of 2-3 lbs in 2-3 days with dry weight 160 lbs.  \par - continue Toprol XL 25 mg bid \par - continue brenda 12.5 mg daily\par - continue Daily  weight and B/P log\par - labs 1/20/23 K 4.9 BUN/creat 32/1.30\par - limit salt to less than 2000 mg per day\par - limit fluid to less than 2 liters per day. He was drinking < 48 oz per day\par - Seen by EP and will consider ICD placement after he follows with hem/oncology for pulmonary nodule. \par \par 2. CAD - no active chest pain\par - medical management of  mid %, LAD 70%, D1 70%\par -continue plavix, ASA, statin and beta blocker metoprolol\par \par 3. Elevated HgA1C/ DM\par - continue Farxiga 5 mg daily,\par \par Follow up in office in 8 weeks, will call with any change in symptoms [EKG obtained to assist in diagnosis and management of assessed problem(s)] : EKG obtained to assist in diagnosis and management of assessed problem(s)

## 2023-03-06 NOTE — PHYSICAL EXAM
[Well Nourished] : well nourished [No Acute Distress] : no acute distress [Normal Conjunctiva] : normal conjunctiva [Normal S1, S2] : normal S1, S2 [Good Air Entry] : good air entry [No Respiratory Distress] : no respiratory distress  [Soft] : abdomen soft [Non Tender] : non-tender [Normal Gait] : normal gait [No Edema] : no edema [No Rash] : no rash [Normal] : alert and oriented, normal memory [Alert and Oriented] : alert and oriented [de-identified] : JVP 6 cm  [de-identified] : left base crackles

## 2023-03-06 NOTE — CARDIOLOGY SUMMARY
[de-identified] : 3/6/23 NSR with 1 st degree AVB 63. LAFB, PRWP, NSST\par 1/26/23 NSR with 1 st degree AVB 74. LAFB, PRWP, NSST\par 11/28/22 NSR with 1 st degree AVB 70. LAFB, PRWP, NSST\par 10/27/22  NSR 71,  LAFB, PRWP, NSST \par 10/13/22 NSR 98, LAFB, PRWP, NSST with PVC's [de-identified] : TTE 1/4/23 with LVEF 27%, LVIDD 6.5 cm , no LV thrombus, severe LV systolic dysfunction, decreased RV systolic dysfunction, severe MR, mild TR. \par \par \par 11/28/22 TTE; LVEF 22%, LVIDD 6.1 cm, mod MR, severe global LV systolic dysfunction, moderate diastolic dysfunction Stage II, with decreased RV systolic function, severe pHTN\par \par 9/24/22 TTE; LVEF 34%, LVIDD 6.1 cm, mod MR, mild AR, normal LA, severe global LV systolic dysfunction, moderate diastolic dysfunction Stage II, normal RV systolic function. mod TR, RV systolic function 82 mmHg c/q severe pHTN [de-identified] : The left ventricle was normal in size. The mid to distal\par anterior, mid to distal anteroseptal, apical, and inferior\par walls do not demonstrate significant viability. The\par septum demonstrates mild viability. The remaining segments\par are viable (predominantly the lateral wall only).\par He had a viability study with reported left ventricle was normal in size. The mid to distal anterior, mid to distal anteroseptal, apical, and inferior walls do not demonstrate significant viability. The septum demonstrates mild viability. The remaining segments are viable (predominantly the lateral wall only). Discussed with interventional card Dr. Helms and opted to manage medically. Since there has been a decline in LVEF to 225, it was decided to repeat Cardiac viability to see if there were any areas that could be revascularized. [de-identified] : 9/2022 RHC: RA 15, PCWP 31, PA sat 59%, CO 3.63, CI 1.70, PVR 2.76.\par 9/2022:  s/p R/LHC with IABP support which showed  mid %, LAD 70%, D1 70% and RA 15,\par PCWP 31, PA sat 59%, CO 3.63, CI 1.70, PVR 2.76; medical management\par \par Cardiac Catheterization (05.11.22 @ 15:16) >\par Intra-aortic Balloon Pump\par An intra-aortic balloon pump was inserted.\par Diagnostic Findings:\par Coronary Angiography\par The coronary circulation is right dominant.\par LM\par Left main artery: Angiography shows no disease.\par LAD\par Proximal left anterior descending: There is a 70 % stenosis. First\par diagonal: There is a 70 % stenosis.\par Patient: ELYSE MALAVE MRN: [id number]\par Study Date: 05/11/2022 03:16 PM Page 1 of 4\par CX\par Circumflex: Angiography shows no disease.\par RCA\par Mid right coronary artery: There is a 100 % stenosis.\par Exam Start Time: 03:16 PM\par Exam End Time: 03:53 PM\par Exam Duration: 37 min\par Patient: ELYSE MALAVE MRN: [id number]\par Study Date: 05/11/2022 03:16 PM Page 2 of 4\par Hemodynamic Pressures:\par Phase Location [mmHg] [mmHg]\par [mmHg] HR\par Baseline LV s 115\par ed 36 88\par Baseline Ao s 126 d 85\par m 102 103\par Baseline RV s 50\par ed 17 81\par Baseline PA s 56 d 31\par m 41 81\par Baseline PCW a 32 v 34\par m 31 77\par Baseline RA a 20 v 16\par m 15 95\par \par  [de-identified] : 1/25/23 doppler of legs with no DVT bilaterally  [de-identified] : PET/CT 1/25/23. Dr. Jaime reviewed results with patient and his wife notable for hypermetabolic HARRY opacity highly suspicious for malignancy, small left pleural effusion. \par \par 1/3/2023- CTA chest showing mildly loculated R pleural effusion and a 1.7cm L upper lobe \par pulmonary nodule

## 2023-03-06 NOTE — ASSESSMENT
[FreeTextEntry1] : 77 year old Male with PMH of bipolar disorder, CAD/STEMI (5/2022) s/p R/LHC with IABP support which showed  mid %, LAD 70%, D1 70% and RA 15, PCWP 31, PA sat 59%, CO 3.63, CI 1.70, PVR 2.76; medical management recommended; TTE showed LV thrombus treated with Coumadin (completed 8/26/22) , HFrEF (EF 34%, LVIDd6.1, moderate TR and severe PH with repeat 11/28/22 with decline in LVEF 22%), RUL mass s/p R. VATS (9/23/2022) c/b cardiogenic shock requiring inotropic support and diuretics. Pertinent labs:AST 2768, ALT 1653 RHC: RA 15, PCWP 31, PA sat 59%, CO 3.63, CI 1.70, PVR.2.7. S/p thoracentesis 1/9/23. Last admission 1/3-/23-1/10/23 secondary to ADHF. S/P RT \par \par ACC/AHA Stage, NYHA Class II-III symptoms\par Appears compensated and with low B/P 86/53, not symptomatic\par \par \par \par \par

## 2023-03-15 ENCOUNTER — APPOINTMENT (OUTPATIENT)
Dept: HEMATOLOGY ONCOLOGY | Facility: CLINIC | Age: 78
End: 2023-03-15
Payer: MEDICARE

## 2023-03-15 ENCOUNTER — NON-APPOINTMENT (OUTPATIENT)
Age: 78
End: 2023-03-15

## 2023-03-15 VITALS
OXYGEN SATURATION: 99 % | TEMPERATURE: 96.4 F | BODY MASS INDEX: 22.11 KG/M2 | RESPIRATION RATE: 16 BRPM | HEART RATE: 70 BPM | WEIGHT: 162.99 LBS | DIASTOLIC BLOOD PRESSURE: 58 MMHG | SYSTOLIC BLOOD PRESSURE: 89 MMHG

## 2023-03-15 PROCEDURE — 99214 OFFICE O/P EST MOD 30 MIN: CPT

## 2023-03-17 NOTE — HISTORY OF PRESENT ILLNESS
[de-identified] : 77 year old male, never smoker, w/ hx of bipolar, BPH, SCC of leg, CHF, CAD, hospitalized on May 11-17/2022 for acute MI s/p cardiac Cath with no intervention (LHC showed 100% occlusion for right coronary artery and 70% occlusion of left anterior descending artery. The arteries were not intervened upon due to viability study showing no viability in the areas supplied by the occluded arteries), cardiogenic shock, found to have new 1x 1.3cm LV thrombus on Plavix and Coumadin (Coumadin completed on 08/26/2022), who f/u with cardiologist and c/o SOB, CXR showed RUL nodule.\par \par 7/11/2022-CT chest-3.5 cm part solid mass is noted in the right upper lobe as described above. Primary consideration is lung carcinoma.\par \par 7/28/2022–PET/CT scan–FDG avid partly solid right upper lung nodule suspicious for malignancy.  Small bilateral pleural effusion with minimal FDG avidity, nonspecific.  Nonspecific mildly FDG avid low-attenuation left adrenal nodule, possibly an adenoma.  Heterogeneous FDG activity in the soft tissue associated with surgical clips overlying the symphysis pubis, probably related from prior procedure.\par \par 9/14/2022–CT-guided biopsy of right upper lung mass–positive for malignant cells, adenocarcinoma.  PD-L1 () TPS 0%. ALK negative.  EGFR mutation analysis–+ EGFR mutation ([id number], CTG>CGG).\par \par 9/23/2022–Status post right VATS-right upper lobectomy-pathology revealed adenocarcinoma, acinar predominant, margin negative.  One level 10 lymph node excised and 5 interlobar lymph nodes negative for carcinoma.  Right pleural fluid negative for malignant cells.\par Intraoperative course complicated by cardiogenic shock requiring inotropic support and diuretics.\par \par 10/21/2020 2-2 cm lobulated opacity in the left upper lobe, new compared to prior exam.\par \par 1/3/2023–CT angiogram of the chest–negative for PE.  Mildly increased moderate loculated right pleural effusion since 12/27/2022.  Indeterminant 1.7 cm apical posterior left upper lobe nodule, enlarged since 7/2022 (4 mm).\par \par 1/25/2023–PET/CT scan–hypermetabolic left upper lobe opacity highly suspicious for malignancy.  Small left pleural effusion.  No hypermetabolic hilar or mediastinal nodes.  Postsurgical changes in the right upper lung field.  No suspicious FDG avid lesion at the surgical site.  Right pleural effusion slightly decreased from the recent CT chest.  Contiguous foci of activity localizing to the vertebral ends of the left 10th-12th ribs with a new fracture in the 11th rib since the recent CT angiogram of the chest.  Pattern of abnormalities compatible with traumatic injury, however, in the presence of a hypermetabolic left upper lobe mass, need to follow closely.\par \par 1/9/2023-S/P Right thoracentesis, 850 ml fluid was drained. Path revealed negative for malignant cells. \par \par 3/1/2023-Completed radiation therapy. Planned Dose: 5 Fx/ 5000 cGy. Treatment Site: Left Lung. [de-identified] : Moderately differentiated invasive acinar adenocarcinoma [de-identified] : 9/2022-PD-L1 () TPS 0%. ALK negative.  EGFR mutation analysis–+ EGFR mutation ([id number], CTG>CGG).\par 1/31/2023-Liquid biopsy-no therapies associated with the genomic findings in this sample. KATHY, 3 Muts/Mb.  [de-identified] : Completed radiation therapy 3/1/23.\par Next CT chest planned in May, per surgeon.\par Had epistaxis 2 weeks ago-saw ENT MD-no recurrence.\par No SOB at rest.\par Anxious. No current complaints of chest pain; no hemoptysis; no fevers.  No neurologic complaints.  No complaints of bone pain.  No GI complaints.\par Sees Dr. Yu regularly (Psych). \par Remains on Plavix.\par \par \par Has had COVID vaccines (Moderna) x 4.

## 2023-03-17 NOTE — ASSESSMENT
[FreeTextEntry1] : Lab work, 3/6/23 cardiology note, 2/23/23 radiation oncology note note reviewed.\par Wife on speaker phone for discussion.\par #1) 9/2022-Stage IB (pT2aN0) adenocarcinoma of the lung, moderately differentiated–status post right upper lobectomy/lymph node sampling.  Intraoperative course complicated by cardiogenic shock.  PD-L1 () TPS 0%. ALK negative.  EGFR mutation analysis–+ EGFR mutation ([id number], CTG>CGG).\par \par Per NCCN guidelines, may consider observation with expectant surveillance or chemotherapy for high risk patients and osimertinib (if EGFR exon 19 deletion or [id number]).  Examples of high risk features may include poorly differentiated tumors, vascular invasion, wedge resection, tumors greater than 4 cm, visceral pleural involvement or unknown lymph node status–these features do not apply in this case-in light of this and patient's comorbidities, favored expectant surveillance approach. \par Had discussed the EGFR mutation with potential implications in the future with treatment available if needed.\par \par 1/3/2023–CT angiogram of the chest–negative for PE.  Mildly increased moderate loculated right pleural effusion since 12/27/2022.  Indeterminant 1.7 cm apical posterior left upper lobe nodule, enlarged since 7/2022 (4 mm).\par \par 1/25/2023–PET/CT scan–hypermetabolic left upper lobe opacity highly suspicious for malignancy.  Small left pleural effusion.  No hypermetabolic hilar or mediastinal nodes.  Postsurgical changes in the right upper lung field.  No suspicious FDG avid lesion at the surgical site.  Right pleural effusion slightly decreased from the recent CT chest.  Contiguous foci of activity localizing to the vertebral ends of the left 10th-12th ribs with a new fracture in the 11th rib since the recent CT angiogram of the chest.  Pattern of abnormalities compatible with traumatic injury, however, in the presence of a hypermetabolic left upper lobe mass, need to follow closely.\par \par 1/9/2023-S/P Right thoracentesis, 850 ml fluid was drained. Path revealed negative for malignant cells. \par \par So, had enlarging left lung lesion suggestive of neoplastic disease–?second primary vs. met. lesion.  Patient saw thoracic surgery and was deemed not to be a surgical candidate in light of comorbidities.  He was referred to radiation oncology for radiation therapy-completed 3/1/2023-5 Fx/ 5000 cGy. Treatment Site: Left Lung.  Had discussed potential role for systemic therapy–note +EGFR mutation with right-sided tumor. However, F/U liquid biopsy at this time -1/31/2023-no therapies associated with the genomic findings in this sample. KATHY, 3 Muts/Mb. Plan expectant surveillance at this time. Patient with multiple comorbidities. Patient and wife concur with plans.\par \par #2) h/o anemia/thrombocytosis-suspect antecedent surgery contributed. h/o Intermittent mild epistaxis may contribute. h/o decreased iron sat.-PO iron supplement (potential side effect profile reviewed). F/U lab work to be done. Should hematologic scenario worsen, can consider further evaluation.\par \par Patient was given the opportunity to ask questions.  His questions have been answered to his apparent satisfaction at this time.  He concurs with plans of care.\par

## 2023-03-28 ENCOUNTER — APPOINTMENT (OUTPATIENT)
Dept: HEMATOLOGY ONCOLOGY | Facility: CLINIC | Age: 78
End: 2023-03-28

## 2023-04-04 ENCOUNTER — TRANSCRIPTION ENCOUNTER (OUTPATIENT)
Age: 78
End: 2023-04-04

## 2023-04-05 ENCOUNTER — NON-APPOINTMENT (OUTPATIENT)
Age: 78
End: 2023-04-05

## 2023-04-07 ENCOUNTER — NON-APPOINTMENT (OUTPATIENT)
Age: 78
End: 2023-04-07

## 2023-04-07 NOTE — HISTORY OF PRESENT ILLNESS
[FreeTextEntry1] : Mr. Becerra is a 77 year old man with history of lung cancer. 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 He has extensive cardiac comorbidities and intraoperative course was complicated by cardiogenic shock. \par \par Subsequent imaging detected what appears to be a contralateral new primary lung cancer vs. contralateral metastasis -- 1.7cm apical posterior AHRRY nodule detected on CTA 1/3/23, confirmed to be hypermetabolic on PET CT of 1/25/23. No pathologic diagnosis of the HARRY nodule has been made. At time of presentation he is physically asymptomatic apart from OGLESBY, although anxious about his diagnosis. He has been deemed a poor candidate for further surgery. \par \par Referred for consideration of definitive radiation therapy for new clinical stage I primary cancer of the left lung vs. small contralateral oligometastasis. \par \par 2/23/2023: Patient presents to clinic for OTV s/p 3/5 FX of 5000 cGy to the left lung. Patient doing well overall, denies respiratory symptoms beyond patient baseline. Denies dysphasia, dyspepsia or esophagitis. Patient will be leaving for [place] for 3 weeks. \par Patient provided with discharge folder, PTE date and discharge education and verbalized understanding. Instructed to contact office should any questions/concerns arise prior to PTE appointment.

## 2023-04-07 NOTE — REVIEW OF SYSTEMS
[Constipation: Grade 1 - Occasional or intermittent symptoms; occasional use of stool softeners, laxatives, dietary modification, or enema] : Constipation: Grade 1 - Occasional or intermittent symptoms; occasional use of stool softeners, laxatives, dietary modification, or enema [Dyspepsia: Grade 0] : Dyspepsia: Grade 0 [Dysphagia: Grade 0] : Dysphagia: Grade 0 [Esophagitis: Grade 0] : Esophagitis: Grade 0 [Nausea: Grade 0] : Nausea: Grade 0 [Vomiting: Grade 0] : Vomiting: Grade 0 [Fatigue: Grade 1 - Fatigue relieved by rest] : Fatigue: Grade 1 - Fatigue relieved by rest [Cough: Grade 0] : Cough: Grade 0 [Dyspnea: Grade 0] : Dyspnea: Grade 0 [Skin Hyperpigmentation: Grade 0] : Skin Hyperpigmentation: Grade 0 [Dermatitis Radiation: Grade 0] : Dermatitis Radiation: Grade 0 [FreeTextEntry2] : patient baseline

## 2023-04-07 NOTE — DISEASE MANAGEMENT
[Clinical] : TNM Stage: c [IA2] : IA2 [TTNM] : 1 [NTNM] : 0 [MTNM] : 0 [de-identified] : 3 fx/ 3000 cGy [de-identified] : 5 Fx/ 5000 cGy [de-identified] : Left Lung

## 2023-04-10 ENCOUNTER — NON-APPOINTMENT (OUTPATIENT)
Age: 78
End: 2023-04-10

## 2023-04-15 ENCOUNTER — TRANSCRIPTION ENCOUNTER (OUTPATIENT)
Age: 78
End: 2023-04-15

## 2023-04-15 ENCOUNTER — NON-APPOINTMENT (OUTPATIENT)
Age: 78
End: 2023-04-15

## 2023-04-17 ENCOUNTER — NON-APPOINTMENT (OUTPATIENT)
Age: 78
End: 2023-04-17

## 2023-04-18 ENCOUNTER — NON-APPOINTMENT (OUTPATIENT)
Age: 78
End: 2023-04-18

## 2023-04-18 ENCOUNTER — APPOINTMENT (OUTPATIENT)
Dept: CARDIOLOGY | Facility: CLINIC | Age: 78
End: 2023-04-18
Payer: MEDICARE

## 2023-04-18 VITALS
TEMPERATURE: 97.7 F | HEIGHT: 72 IN | RESPIRATION RATE: 17 BRPM | WEIGHT: 170 LBS | HEART RATE: 81 BPM | BODY MASS INDEX: 23.03 KG/M2 | DIASTOLIC BLOOD PRESSURE: 65 MMHG | SYSTOLIC BLOOD PRESSURE: 95 MMHG | OXYGEN SATURATION: 97 %

## 2023-04-18 PROCEDURE — 93000 ELECTROCARDIOGRAM COMPLETE: CPT

## 2023-04-18 PROCEDURE — 99214 OFFICE O/P EST MOD 30 MIN: CPT

## 2023-04-24 ENCOUNTER — APPOINTMENT (OUTPATIENT)
Dept: THORACIC SURGERY | Facility: CLINIC | Age: 78
End: 2023-04-24

## 2023-04-24 NOTE — ASSESSMENT
[FreeTextEntry1] : Without evidence for recent infarction overt heart failure or unstable angina and based upon satisfactory clinical exam, Mr Becerra  may undergo planned low risk needle biopsy at an  ASA class III anesthesia risk. Without evidence for recent infarction overt heart failure or unstable angina and based upon satisfactory clinical exam, Mr Becerra  may undergo planned high risk VATS RU Lobectomy at an  ASA class III anesthesia risk. We discussed recent myocardial event which poses increased risk along with severe left ventricular dysfunction and advanced coronary disease which both impose increased cardiovascular risk for high risk surgery , however the benefits of a diagnostic procedure along with surgical intervention outweigh the risk of the procedure given the concern over an advancing carcinoma now with hemoptysis.  Mr Becerra understands inherent risks and wishes to proceed. All routes remain hazardous. \par \par medication management for an ischemic cardiomyopathy\par Aspirin is held as of 9/9/22 , 5 days prior to a needle biopsy and resumed when able from an IR standpoint. Would consider continuing aspirin 81 mg post needle biopsy for planned VATS if OK from a surgical standpoint. Furosemide is continued at 40 mg per day and would continue atorvastatin 80 mg metoprolol succinate 100 mg and consider heart failure meds such as ACE inhibitor and spironolactone and Farxiga when stabilized surgically and hemodynamically. A defibrillator may also need to be considered if ejection fraction less than 30% after GDMT for 40 days\par \par 4/18/23\par we will decrease medications given relative hypotension.entresto is decreased to 1/2 tab twice a day. he is referred to DR Sheehan for possible ICD give EF <.3\par \par This represents a high amount of medical decision making based upon two or more more chronic illnesses  and drug evaluation and management and subspecialty referral dr Sheehan ep service [facility]\par \par discussed with wife at bedside \par \par Addendum\par 9/17/2022\par Recent biopsy demonstrates adenocarcinoma\par \par Addendum 1/24/2023\par Sonogram rule out DVT is negative.  Discussed repeat echo.  I discussed pros and cons of dual antiplatelet therapy currently on aspirin Plavix would continue for now increased risk of bleeding discussed\par \par Medical necessity there is a high risk encounter based upon concern for deep vein thrombosis.  Along with possible need for ICD

## 2023-04-24 NOTE — CARDIOLOGY SUMMARY
[___] : [unfilled] [LVEF ___%] : LVEF [unfilled]% [de-identified] : 5/11/2022 normal sinus rhythm anteroseptal pattern anterior ST segment elevation \par 5/27/22 anterior mi AI lafb rsr pac\par rsr lafb qrsd 110 [de-identified] : 5/20/22 EF .3  LVT 1x1.3 cm decreased RV function RV Enlargement MARCOS \par 8/25/2022 cardiomyopathy with resolution of a left ventricular thrombus [de-identified] : minimal viability in infarcted territories [de-identified] : 5/11/22 lad .7 diag .7 RCA 1.00 circ normal

## 2023-04-24 NOTE — HISTORY OF PRESENT ILLNESS
[FreeTextEntry1] : This is a 76 y/o M with pmhx of Depression, bipolar, BPH, COVID Winter 2021(recovered), hx of STEMI and cardiogenic shock in may 2022, presented to the [facility] ED for new onset hemoptysis.  Known R lung\par nodule, suspicious for CA, pending IR biopsy. Patient with recent hospitalization where he had a STEMI 5/2022, found to have 100% occlusion RCA and 70% occlusion LAD, needed CCU for IABP support and then\par weaned off. Viability showing poor salvageability and so he was medically managed. Also during this time, TTE with EF 30%, LV thrombus for which he was started on Coumadin, DAPT. He was to continue Coumadin x 3 months, last dose up until 8/26 for which he was compliant.He was hospitalized at [facility] with hemoptysis . He underwent echo 8/25/22 that showed a cardiomyopathy and resolution of the left ventricular thrombus. I coordinated care with the Hospitalist service and warfarin was discontinued. He was discharged on aspirin alone.  He comes today for clarification of  his overall cardiovascular risk prior to a planned biopsy and surgical excision of a lung mass. He was advised to undergo a complete cardiac evaluation. He denies chest pains shortness of breath or loss of consciousnes.\par \par 1/24/2023\par John is seen today after prolonged hospitalization we reinstituted heart failure meds in the setting of possible metastatic carcinoma.  Concern was raised regarding edema and possible thrombosis\par \par 4/18/23\par \par 4/13/223 John was recently seen in rehab he felt fatigue his blood pressure was 90/54 and reported lightheadedness his blood pressure dropped to 78/50\par \par

## 2023-05-01 ENCOUNTER — OUTPATIENT (OUTPATIENT)
Dept: OUTPATIENT SERVICES | Facility: HOSPITAL | Age: 78
LOS: 1 days | End: 2023-05-01
Payer: MEDICARE

## 2023-05-01 ENCOUNTER — APPOINTMENT (OUTPATIENT)
Dept: CT IMAGING | Facility: HOSPITAL | Age: 78
End: 2023-05-01
Payer: MEDICARE

## 2023-05-01 DIAGNOSIS — Z98.890 OTHER SPECIFIED POSTPROCEDURAL STATES: Chronic | ICD-10-CM

## 2023-05-01 DIAGNOSIS — R91.1 SOLITARY PULMONARY NODULE: ICD-10-CM

## 2023-05-01 DIAGNOSIS — C34.91 MALIGNANT NEOPLASM OF UNSPECIFIED PART OF RIGHT BRONCHUS OR LUNG: ICD-10-CM

## 2023-05-01 PROCEDURE — 71250 CT THORAX DX C-: CPT | Mod: 26,MH

## 2023-05-01 PROCEDURE — 71250 CT THORAX DX C-: CPT

## 2023-05-03 ENCOUNTER — APPOINTMENT (OUTPATIENT)
Dept: RADIATION ONCOLOGY | Facility: CLINIC | Age: 78
End: 2023-05-03
Payer: MEDICARE

## 2023-05-03 VITALS
HEIGHT: 72 IN | BODY MASS INDEX: 23.43 KG/M2 | RESPIRATION RATE: 16 BRPM | OXYGEN SATURATION: 97 % | DIASTOLIC BLOOD PRESSURE: 61 MMHG | SYSTOLIC BLOOD PRESSURE: 101 MMHG | WEIGHT: 173 LBS | HEART RATE: 67 BPM

## 2023-05-03 PROCEDURE — 99024 POSTOP FOLLOW-UP VISIT: CPT

## 2023-05-03 RX ORDER — MIRTAZAPINE 15 MG/1
15 TABLET, FILM COATED ORAL
Refills: 0 | Status: DISCONTINUED | COMMUNITY
Start: 2023-01-17 | End: 2023-05-03

## 2023-05-03 NOTE — REVIEW OF SYSTEMS
[Dyspepsia: Grade 0] : Dyspepsia: Grade 0 [Dysphagia: Grade 0] : Dysphagia: Grade 0 [Esophagitis: Grade 0] : Esophagitis: Grade 0 [Nausea: Grade 0] : Nausea: Grade 0 [Vomiting: Grade 0] : Vomiting: Grade 0 [Fatigue: Grade 0] : Fatigue: Grade 0 [Cognitive Disturbance: Grade 0] : Cognitive Disturbance: Grade 0 [Dizziness: Grade 0] : Dizziness: Grade 0  [Headache: Grade 0] : Headache: Grade 0 [Dyspnea: Grade 0] : Dyspnea: Grade 0 [Skin Hyperpigmentation: Grade 0] : Skin Hyperpigmentation: Grade 0 [Dermatitis Radiation: Grade 0] : Dermatitis Radiation: Grade 0

## 2023-05-04 ENCOUNTER — NON-APPOINTMENT (OUTPATIENT)
Age: 78
End: 2023-05-04

## 2023-05-04 ENCOUNTER — APPOINTMENT (OUTPATIENT)
Dept: HEART FAILURE | Facility: CLINIC | Age: 78
End: 2023-05-04
Payer: MEDICARE

## 2023-05-04 ENCOUNTER — APPOINTMENT (OUTPATIENT)
Dept: ELECTROPHYSIOLOGY | Facility: CLINIC | Age: 78
End: 2023-05-04
Payer: MEDICARE

## 2023-05-04 VITALS
HEART RATE: 67 BPM | DIASTOLIC BLOOD PRESSURE: 69 MMHG | HEIGHT: 72 IN | WEIGHT: 177 LBS | OXYGEN SATURATION: 98 % | SYSTOLIC BLOOD PRESSURE: 103 MMHG | BODY MASS INDEX: 23.98 KG/M2

## 2023-05-04 DIAGNOSIS — R91.8 OTHER NONSPECIFIC ABNORMAL FINDING OF LUNG FIELD: ICD-10-CM

## 2023-05-04 DIAGNOSIS — R06.09 OTHER FORMS OF DYSPNEA: ICD-10-CM

## 2023-05-04 PROCEDURE — 99214 OFFICE O/P EST MOD 30 MIN: CPT

## 2023-05-04 PROCEDURE — 93000 ELECTROCARDIOGRAM COMPLETE: CPT

## 2023-05-04 NOTE — DISCUSSION/SUMMARY
[Patient] : the patient [___ Week(s)] : in [unfilled] week(s) [FreeTextEntry2] : wife [FreeTextEntry1] : 1. Chronic systolic congestive heart failure. LVEF 27% ( TTE 1/4/23)\par - continue  Entresto 24-26 mg ( 1/2 tab 12-13 mg)  bid\par - continue Farxiga 5 mg dally \par - continue bumex 1 mg daily and as needed for weight gain of 2-3 lbs in 2-3 days . \par  - continue Toprol XL 25 mg bid \par - continue brenda 12.5 mg daily\par - continue Daily  weight and B/P log\par - labs 3/6/23 K 5, BUN/creat 41/1.25 \par - limit salt to less than 2000 mg per day\par - limit fluid to less than 2 liters per day. He was drinking < 48 oz per day\par - repeat TTE and if LVEF < 35%, will  consider ICD placement. Pt is following up with EP today\par \par 2. CAD - no active chest pain\par - medical management of  mid %, LAD 70%, D1 70%\par -continue plavix, ASA, statin and beta blocker metoprolol\par \par 3. Elevated HgA1C/ DM\par - continue Farxiga 5 mg daily,\par \par Follow up in office in 8 weeks, will call with any change in symptoms [EKG obtained to assist in diagnosis and management of assessed problem(s)] : EKG obtained to assist in diagnosis and management of assessed problem(s)

## 2023-05-04 NOTE — ASSESSMENT
[FreeTextEntry1] : 78 year old Male with PMH of bipolar disorder, CAD/STEMI (5/2022) s/p R/LHC with IABP support which showed  mid %, LAD 70%, D1 70% and RA 15, PCWP 31, PA sat 59%, CO 3.63, CI 1.70, PVR 2.76; medical management recommended; TTE showed LV thrombus treated with Coumadin (completed 8/26/22) , HFrEF (EF 34%, LVIDd6.1, moderate TR and severe PH with repeat 11/28/22 with decline in LVEF 22%), RUL mass s/p R. VATS (9/23/2022) c/b cardiogenic shock requiring inotropic support and diuretics. Pertinent labs:AST 2768, ALT 1653 RHC: RA 15, PCWP 31, PA sat 59%, CO 3.63, CI 1.70, PVR.2.7. S/p thoracentesis 1/9/23. Last admission 1/3-/23-1/10/23 secondary to ADHF. S/P RT \par \par ACC/AHA Stage, NYHA Class II  symptoms\par Appears compensated and low normotensive\par \par \par \par \par

## 2023-05-04 NOTE — CARDIOLOGY SUMMARY
[de-identified] : 5/4/23 NSR 67 with 1 st degree AVB 63. LAFB, PRWP, NSST\par 3/6/23 NSR with 1 st degree AVB 63. LAFB, PRWP, NSST\par 1/26/23 NSR with 1 st degree AVB 74. LAFB, PRWP, NSST\par 11/28/22 NSR with 1 st degree AVB 70. LAFB, PRWP, NSST\par 10/27/22  NSR 71,  LAFB, PRWP, NSST \par 10/13/22 NSR 98, LAFB, PRWP, NSST with PVC's [de-identified] : TTE 1/4/23 with LVEF 27%, LVIDD 6.5 cm , no LV thrombus, severe LV systolic dysfunction, decreased RV systolic dysfunction, severe MR, mild TR. \par \par \par 11/28/22 TTE; LVEF 22%, LVIDD 6.1 cm, mod MR, severe global LV systolic dysfunction, moderate diastolic dysfunction Stage II, with decreased RV systolic function, severe pHTN\par \par 9/24/22 TTE; LVEF 34%, LVIDD 6.1 cm, mod MR, mild AR, normal LA, severe global LV systolic dysfunction, moderate diastolic dysfunction Stage II, normal RV systolic function. mod TR, RV systolic function 82 mmHg c/q severe pHTN [de-identified] : The left ventricle was normal in size. The mid to distal\par anterior, mid to distal anteroseptal, apical, and inferior\par walls do not demonstrate significant viability. The\par septum demonstrates mild viability. The remaining segments\par are viable (predominantly the lateral wall only).\par He had a viability study with reported left ventricle was normal in size. The mid to distal anterior, mid to distal anteroseptal, apical, and inferior walls do not demonstrate significant viability. The septum demonstrates mild viability. The remaining segments are viable (predominantly the lateral wall only). Discussed with interventional card Dr. Helms and opted to manage medically. Since there has been a decline in LVEF to 225, it was decided to repeat Cardiac viability to see if there were any areas that could be revascularized. [de-identified] : 9/2022 RHC: RA 15, PCWP 31, PA sat 59%, CO 3.63, CI 1.70, PVR 2.76.\par 9/2022:  s/p R/LHC with IABP support which showed  mid %, LAD 70%, D1 70% and RA 15,\par PCWP 31, PA sat 59%, CO 3.63, CI 1.70, PVR 2.76; medical management\par \par Cardiac Catheterization (05.11.22 @ 15:16) >\par Intra-aortic Balloon Pump\par An intra-aortic balloon pump was inserted.\par Diagnostic Findings:\par Coronary Angiography\par The coronary circulation is right dominant.\par LM\par Left main artery: Angiography shows no disease.\par LAD\par Proximal left anterior descending: There is a 70 % stenosis. First\par diagonal: There is a 70 % stenosis.\par Patient: ELYSE MALAVE MRN: [id number]\par Study Date: 05/11/2022 03:16 PM Page 1 of 4\par CX\par Circumflex: Angiography shows no disease.\par RCA\par Mid right coronary artery: There is a 100 % stenosis.\par Exam Start Time: 03:16 PM\par Exam End Time: 03:53 PM\par Exam Duration: 37 min\par Patient: ELYSE MALAVE MRN: [id number]\par Study Date: 05/11/2022 03:16 PM Page 2 of 4\par Hemodynamic Pressures:\par Phase Location [mmHg] [mmHg]\par [mmHg] HR\par Baseline LV s 115\par ed 36 88\par Baseline Ao s 126 d 85\par m 102 103\par Baseline RV s 50\par ed 17 81\par Baseline PA s 56 d 31\par m 41 81\par Baseline PCW a 32 v 34\par m 31 77\par Baseline RA a 20 v 16\par m 15 95\par \par  [de-identified] : 1/25/23 doppler of legs with no DVT bilaterally  [de-identified] : PET/CT 1/25/23. Dr. Jaime reviewed results with patient and his wife notable for hypermetabolic HARRY opacity highly suspicious for malignancy, small left pleural effusion. \par \par 1/3/2023- CTA chest showing mildly loculated R pleural effusion and a 1.7cm L upper lobe \par pulmonary nodule

## 2023-05-04 NOTE — PHYSICAL EXAM
[Well Nourished] : well nourished [No Acute Distress] : no acute distress [Normal Conjunctiva] : normal conjunctiva [Normal S1, S2] : normal S1, S2 [Good Air Entry] : good air entry [No Respiratory Distress] : no respiratory distress  [Soft] : abdomen soft [Non Tender] : non-tender [Normal Gait] : normal gait [No Rash] : no rash [Normal] : alert and oriented, normal memory [Alert and Oriented] : alert and oriented [de-identified] : JVP 6 cm  [de-identified] : left base crackles [de-identified] : trace ankle edema bilaterally

## 2023-05-04 NOTE — HISTORY OF PRESENT ILLNESS
[FreeTextEntry1] : 78 year old Male with PMH of bipolar disorder, CAD/STEMI (5/2022) s/p R/LHC with IABP support which showed  mid %, LAD 70%, D1 70% and RA 15, PCWP 31, PA sat 59%, CO 3.63, CI 1.70, PVR 2.76; medical management recommended; TTE showed LV thrombus treated with Coumadin (completed 8/26/22) , HFrEF (EF 34%, LVIDd6.1, moderate TR and severe PH with repeat 11/28/22 with decline in LVEF 22%), RUL mass s/p R. VATS (9/23/2022) c/b cardiogenic shock requiring inotropic support and diuretics. Pertinent labs:AST 2768, ALT 1653 RHC: RA 15, PCWP 31, PA sat 59%, CO 3.63, CI 1.70, PVR.2.7. S/p thoracentesis 1/9/23. Last admission 1/3-/23-1/10/23 secondary to ADHF.  S/P RT 2/023. Pt is here for a follow up . Accompanied by his wife. Card:Dr. Walden.\par \par Prior course in hospital: presented 1/3/23 worsening shortness of breath with exertion x 2 weeks, orthopnea, paroxysmal  nocturnal dyspnea, found to have elevated pro-BNP, +JVD, +entero/rhinovirus w/  CTA chest showing mildly loculated R pleural effusion and a 1.7cm L upper lobe  pulmonary nodule. Patient was seen by heart failure and was diuresed with  clinical improvement. He was also seen by pulmonology for possible thoracentesis, which he underwent on 1/9 after Plavix was held for 5d. Fluid  studies were consistent with a transudative process likely from heart failure exacerbation. The patient's GDMT for heart failure was optimized and he was started on Farxiga and Entresto. During this admission, the patient was also seen by psych due to worsening depression; no changes were made. \par \par Pt  is here for a follow up today with his wife. 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 small left pleural effusion on \par  \par Currently, he has been feeling well. He  taking bumex 1 mg daily and 1/2 dose Entresto 12-13 mg bid. Dosage had been decreased due to low blood pressure with dizziness/LH with no further episodes since decreasing the Entresto. \par \par States his appetite has improved and he feels he has gained some calorie weight with home weight range 174 lbs. Home B/P range 100/58 and is 103/69 in office today. \par  \par  Last TTE during admission 1/4/23 with LVEF 27%, LVIDD 6.5 cm , no LV thrombus, severe LV systolic dysfunction, decreased RV systolic dysfunction, severe MR, mild TR. \par \par Currently, he is able to walk for 25-30 minutes and can climb a flight of stairs without dyspnea. sleeps with 2 pillows with no orthopnea. He is attending Cardiac rehab at the [facility] and also swims laps in the pool without issues.. He is adhering to a low salt diet and is drinking less than 2 liters of fluid per day\par \par He denies chest pain, palpitations, dizziness/LH and syncope and he does not have an ICD. Following up with EP today to discuss ICD placement. He was waiting til he finished his RT. \par \par Of note, he is  , non smoker, no ETOH, + family history of CAD in brothers; one with ICD and 3 V CABG, other with CHF, stents\par \par \par Patient name: ELYSE MALAVE\par YOB: 1945   Age: 77 (M)   MR#: [id number]\par Study Date: 11/28/2022\par Location: O/PSonographer: Venus Mcpherson RDCS\par Study quality: Technically good\par Referring Physician: VIPUL JEROME NP\par Blood Pressure: 98/62 mmHg\par Height: 183 cm\par Weight: 79 kg\par BSA: 2 m2\par ------------------------------------------------------------------------\par PROCEDURE: Transthoracic echocardiogram with 2-D, M-Mode\par and complete spectral and color flow Doppler.\par INDICATION: Heart failure, unspecified (I50.9)\par ------------------------------------------------------------------------\par DIMENSIONS:\par Dimensions:     Normal Values:\par LA:     4.6 cm    2.0 - 4.0 cm\par Ao:     3.5 cm    2.0 - 3.8 cm\par SEPTUM: 0.8 cm    0.6 - 1.2 cm\par PWT:    0.8 cm    0.6 - 1.1 cm\par LVIDd:  6.1 cm    3.0 - 5.6 cm\par LVIDs:  5.5 cm    1.8 - 4.0 cm\par Derived Variables:\par LVMI: 95 g/m2\par RWT: 0.26\par Fractional short: 10 %\par Ejection Fraction (Pizarro Rule): 22 %\par ------------------------------------------------------------------------\par OBSERVATIONS:\par Mitral Valve: Mitral annular calcification, otherwise\par normal mitral valve. Moderate mitral regurgitation.\par Aortic Root: Normal aortic root.\par Aortic Valve: Calcified trileaflet aortic valve with normal\par opening. Mild aortic regurgitation. \par Left Atrium: Severely dilated left atrium.  LA volume index\par = 49 cc/m2.\par Left Ventricle: Severe global left ventricular systolic\par dysfunction. Mild left ventricular enlargement. Moderate\par diastolic dysfunction (Stage II).\par Right Heart: Moderate right atrial enlargement. Right\par ventricular enlargement with decreased right ventricular\par systolic function. Normal tricuspid valve.  Mild-moderate\par tricuspid regurgitation. Normal pulmonic valve.  Mild\par pulmonic regurgitation.\par Pericardium/PleuraNormal pericardium with no pericardial\par effusion.\par Hemodynamic: Estimated right ventricular systolic pressure\par equals 62 mm Hg, assuming right atrial pressure equals 10\par mm Hg, consistent with severe pulmonary hypertension.\par ------------------------------------------------------------------------\par CONCLUSIONS:\par 1. Mitral annular calcification, otherwise normal mitral\par valve. Moderate mitral regurgitation.\par 2. Calcified trileaflet aortic valve with normal opening.\par Mild aortic regurgitation. \par 3. Severely dilated left atrium.  LA volume index = 49\par cc/m2.\par 4. Mild left ventricular enlargement.\par 5. Severe global left ventricular systolic dysfunction.\par 6. Moderate diastolic dysfunction (Stage II).\par 7. Moderate right atrial enlargement.\par 8. Right ventricular enlargement with decreased right\par ventricular systolic function.\par 9. Estimated right ventricular systolic pressure equals 62\par mm Hg, assuming right atrial pressure equals 10 mm Hg,\par consistent with severe pulmonary hypertension.\par *** Compared with echocardiogram of 9/24/2022, left\par ventricular systolic function has deteriorated.  The\par estimated pulmonary artery systolic pressure has decreased.\par ------------------------------------------------------------------------\par Confirmed on  11/28/2022 - 13:32:27 by Nick Mead M.D.,\par REAL LARSON\par ------------------------------------------------------------------------\par \par \par Echocardiogram:\par Transthoracic Echocardiogram (09.24.22 @ 14:33)\par DIMENSIONS:\par Dimensions: Normal Values:\par LA: 4.1 cm 2.0 - 4.0 cm\par Ao: 3.7 cm 2.0 - 3.8 cm\par SEPTUM: 1.2 cm 0.6 - 1.2 cm\par PWT: 1.2 cm 0.6 - 1.1 cm\par LVIDd: 6.1 cm 3.0 - 5.6 cm\par LVIDs: --- 1.8 - 4.0 cm\par Derived Variables:\par LVMI: 161 g/m2\par RWT: 0.39\par Ejection Fraction (Modified Pizarro Rule): 34 %\par ------------------------------------------------------------------------\par OBSERVATIONS:\par Mitral Valve: Mitral annular calcification, otherwise\par normal mitral valve. Moderate mitral regurgitation.\par Aortic Root: Normal aortic root.\par Aortic Valve: Calcified trileaflet aortic valve with normal\par opening. Mild aortic regurgitation.\par Left Atrium: Normal left atrium. LA volume index = 28\par cc/m2.\par Left Ventricle: Severe global left ventricular systolic\par dysfunction. Severe left ventricular enlargement. Moderate\par diastolic dysfunction (Stage II).\par Right Heart: Normal right atrium. Right ventricular\par enlargement with normal right ventricular systolic\par function. Normal tricuspid valve. Moderate tricuspid\par regurgitation. Normal pulmonic valve. Mild pulmonic\par regurgitation.\par Pericardium/PleuraNormal pericardium with no pericardial\par effusion.\par Hemodynamic: Estimated right ventricular systolic pressure\par equals 82 mm Hg, assuming right atrial pressure equals 10\par mm Hg, consistent with severe pulmonary hypertension.\par ------------------------------------------------------------------------\par CONCLUSIONS:\par 1. Mitral annular calcification, otherwise normal mitral\par valve. Moderate mitral regurgitation.\par 2. Severe left ventricular enlargement.\par 3. Severe global left ventricularsystolic dysfunction.\par 4. Moderate diastolic dysfunction (Stage II).\par 5. Right ventricular enlargement with normal right\par ventricular systolic function.\par 6. Estimated pulmonary artery systolic pressure equals 82\par mm Hg, assuming right atrial pressureequals 10 mm Hg,\par consistent with severe pulmonary hypertension.\par \par  Cardiac Catheterization (05.11.22 @ 15:16) >\par Intra-aortic Balloon Pump\par An intra-aortic balloon pump was inserted.\par Diagnostic Findings:\par Coronary Angiography\par The coronary circulation is right dominant.\par LM\par Left main artery: Angiography shows no disease.\par LAD\par Proximal left anterior descending: There is a 70 % stenosis. First\par diagonal: There is a 70 % stenosis.\par Patient: ELYSE MALAVE MRN: [id number]\par Study Date: 05/11/2022 03:16 PM Page 1 of 4\par CX\par Circumflex: Angiography shows no disease.\par RCA\par Mid right coronary artery: There is a 100 % stenosis.\par Exam Start Time: 03:16 PM\par Exam End Time: 03:53 PM\par Exam Duration: 37 min\par Patient: ELYSE MALAVE MRN: [id number]\par Study Date: 05/11/2022 03:16 PM Page 2 of 4\par Hemodynamic Pressures:\par Phase Location [mmHg] [mmHg]\par [mmHg] HR\par Baseline LV s 115\par ed 36 88\par Baseline Ao s 126 d 85\par m 102 103\par Baseline RV s 50\par ed 17 81\par Baseline PA s 56 d 31\par m 41 81\par Baseline PCW a 32 v 34\par m 31 77\par Baseline RA a 20 v 16\par m 15 95\par \par Oxygen Saturations\par Phase Location Hb Sat pO2\par Content Group\par Baseline AO 14 99\par 19.33 SA\par Baseline PA 14 59\par 11.48 PA\par \par Oxygen Saturation Average, Phase: Baseline\par PV Hb PV Sat PV pO2\par PV Content\par SA Hb 14.40 g/dL SA Sat 98.70 % SA pO2\par SA Content 19.33 %\par PA Hb 14.40 g/dL PA Sat 58.60 % PA pO2\par PA Content 11.48 %\par MV Hb MV Sat MV pO2\par MV Content\par Strokework:\par Phase: Baseline\par LVSW: 34.02 g*m LVSW (index):\par 15.96 g*m/m2\par RVSW: 15.84 g*m RVSW (index):\par 7.43 g*m/m2\par Flow Calculations, Phase: Baseline\par CO 3.63 l/min HR\par O2Insp\par CI 1.70 l/min/m2 PO2\par O2Exp\par SV VO2 285.00 ml/min\par O2(ExAir)\par SVI A-VO2\par Hb 14.40 gm/d\par Shunts:\par Qs 3.63 l/min Qs Index 1.70\par l/min/m2\par Qp 3.63 l/min Qp Index 1.70\par l/min/m2 Qp/Qs\par EPBF EPBF Index\par L-R L-R Index\par R-L R-L Index\par Systemic Resistances, Phase: Baseline\par SVR 1917.35 dyn*s/cm5 TVR\par 2247.93 dyn*s/cm5\par \par Patient: ELYSE MALAVE MRN: [id number]\par Study Date: 05/11/2022 03:16PM Page 3 of 4\par \par \par \par \par SVRI 4086.28 dyn*s*m2/cm5 TVRI 4790.81 dyn*s*m2/cm5\par SVR 23.97 MERCADO TVR\par 28.11 MERCADO\par SVRI 51.09 MERCADO*m2 TVRI\par 59.90 MERCADO*m2\par Pulmonary Resistances:\par .39 dyn*s/cm5 TPR\par 903.58 dyn*s/cm5\par PVRI 469.69 dyn*s*m2/cm5 TPRI\par 1925.72 dyn*s*m2/cm5\par PVR 2.76 MERCADO TPR\par 11.30 MERCADO\par PVRI 5.87 MERCADO*m2 TPRI\par 24.08 MERCADO*m2\par Ratios:\par PVR-SVR 0.11 TPR-TVR\par 0.4\par Shunts:\par venO2 CO\par R-L Shunt R-L Shunt\par Sammy. Method\par L-R Shunt L-R Shunt\par R-L Shunt R-L Shunt\par Sammy. Method\par \par Conscious sedation was administered and monitored by Cardiology\par nursing staff,\par under my direct supervision when applicable.\par \par Electronically signed by Stew Helms MD on 05/11/2022 at 05:17 PM\par \par \par \par \par Cardiac Viability (05.12.22 @ 15:17)\par NUCLEAR FINDINGS:\par The left ventricle was normal in size. The mid to distal\par anterior, mid to distalanteroseptal, apical, and inferior\par walls do not demonstrate significant viability. The\par septum demonstrates mild viability. The remaining segments\par are viable (predominantly the lateral wall only).\par ------------------------------------------------------------------------\par GATED ANALYSIS:\par Rest gated wall motion analysis was performed (LVEF = 29\par %;LVEDV = 205 ml.), revealing markedly reduced LV\par function with regional variation.\par ------------------------------------------------------------------------\par IMPRESSIONS:Abnormal Study\par * The left ventricle was normal in size. The mid to distal\par anterior, mid to distal anteroseptal, apical, and inferior\par walls do not demonstrate significant viability. The\par septum demonstrates mild viability. Theremaining segments\par are viable (predominantly the lateral wall only).\par * Rest gated wall motion analysis was performed (LVEF = 29\par %;LVEDV = 205 ml.), revealing markedly reduced LV\par function with regional variation.\par Conclusion:\par The left ventricle was normal in size. The mid to distal\par anterior, mid to distal anteroseptal, apical, and inferior\par walls do not demonstrate significant viability. The\par septum demonstrates mild viability. The remaining segments\par are viable (predominantly the lateral wall only).\par ------------------------------------------------------------------------\par \par \par \par \par Assessment and Plan:\par - Assessment \par 77 year old Male with PMH of bipolar disorder, CAD/STEMI (5/2022) s/p R/LHC\par with IABP support which showed  mid %, LAD 70%, D1 70% and RA 15,\par PCWP 31, PA sat 59%, CO 3.63, CI 1.70, PVR 2.76; medical management\par recommended; TTE showed LV thrombus treated with Coumadin, HFrEF (EF 34%, LVIDd\par 6.1, moderate TR and severe PH), RUL mass s/p R. VATS (9/23/2022) c/b\par cardiogenic shock requiring inotropic support and diuretics. Pertinent labs:\par AST 2768, ALT 1653 RHC: RA 15, PCWP 31, PA sat 59%, CO 3.63, CI 1.70, PVR\par 2.76.\par 9/28 Dobutamine weaned off and hemodynamically stable\par 9/29 Lactate = 1.3 and VSat = 53.9% CO/CI 3.2/1.5\par BNP decreased to 3,514 AST/ALT decreased to 351/652\par \par \par \par \par \par Problem/Plan - \par Attestation Statements:\par Attestation Statements:\par Attending and PA/NP shared services statement (NON-critical care):\par \par Attending to bill.\par \par I independently performed the documented:\par \par Medical decision making.\par \par \par \par                      \par \par PROCEDURE DATE:  10/01/2022  CXR\par INTERPRETATION:  INDICATION: S/P VATS\par COMPARISON: 9/30/22\par Technique: AP radiograph of the chest\par FINDINGS:\par S/P removal of right IJ line.\par Heart/Vascular: Stable mild cardiomegaly.\par Pulmonary: New, small pleural effusion. Remainder of the lungs are clear. \par No focal infiltrate. No pneumothorax\par Bones: No acute bony finding\par Impression:\par New, small right pleural effusion.\par \par \par \par

## 2023-05-05 PROBLEM — R06.09 DYSPNEA ON EXERTION: Status: ACTIVE | Noted: 2023-01-26

## 2023-05-05 NOTE — HISTORY OF PRESENT ILLNESS
[FreeTextEntry1] : John Becerra is a 77y/o man with Hx of bipolar disorder, CAD/STEMI (5/2022) s/p R/LHC with IABP support which showed  mid %, LAD 70%, D1 70% and RA 15, PCWP 31, PA sat 59%, CO 3.63, CI 1.70, PVR 2.76; medical management recommended; TTE showed LV thrombus treated with Coumadin (completed 8/26/22) , HFrEF (EF 34%, LVIDd6.1, moderate TR and severe PH with repeat 11/28/22 with decline in LVEF 22%), RUL mass/adenocarcinoma s/p R. VATS (9/23/2022) c/b cardiogenic shock requiring inotropic support and diuretics, and recent hospitalization (1/2023) for acute HF exacerbation c/b pleural effusion s/p thoracentesis who presents today for routine f/u. Seeing HF today in office. Struggling with severe LV dysfunction since May 2022. Treated on OMT, now on Farxiga and Entresto. Stable dyspnea on exertion. Follows with CTsx as well as HemeOnc for newly found pulmonary nodule. Struggling with depression, medically managed. Seen by Psych during recent hospitalization. Feels well at present. Denies chest pain, palpitations, SOB at rest, syncope or near syncope.\par

## 2023-05-05 NOTE — DISCUSSION/SUMMARY
[EKG obtained to assist in diagnosis and management of assessed problem(s)] : EKG obtained to assist in diagnosis and management of assessed problem(s) [FreeTextEntry1] : Impression:\par \par 1. Chronic systolic CHF: NYHA Class II symptoms. EKG reviewed with NSR. Review of ECHOs reveals severe LV dysfunction. Given NYHA Class II symptoms and severe LV dysfunction despite > 3 mo of OMT, recommend undergoing ICD placement for primary prevention of SCD. A thorough discussion was had with the patient concerning all aspects of ICD therapy. We reviewed the data supporting ICD therapy and how it applies individually.  We discussed the procedures, risks and outcomes of ICD implantation an living with an ICD. We discussed management of ICD therapy throughout life, including deactivation of the ICD. Will consider ICD placement in future, wants to wait until post repeat ECHO. Resume OMT as prescribed, encouraged heart healthy diet, daily weight, and regular f/u with Cardiology/Heart failure team as scheduled.\par \par 2. HTN: resume oral antihypertensives as prescribed. Encouraged heart healthy diet, sodium restriction, and weight loss. Continue regular f/u with Cardiologist for further HTN management.\par \par 3. HLD: resume statin therapy as prescribed and regular f/u with Cardiologist for routine lipid monitoring and management.\par \par Consider ICD in future.\par Will continue f/u with Cardiologist and may RTO as needed or if any new or worsening symptoms or findings occur.

## 2023-05-05 NOTE — CARDIOLOGY SUMMARY
[de-identified] : 1/4/2023: Conclusions: \par 1. Normal mitral valve. Severe mitral regurgitation. The \par EROA using flow convergence method is 0.24 cm2,  MR \par regurgitant volume is 36 mL/beat. MR vena contracta is 0.51 \par cm. \par 2. Normal trileaflet aortic valve. Mild-moderate aortic \par regurgitation. \par 3. Eccentric left ventricular hypertrophy (dilated left \par ventricle with normal relative wall thickness).Severe \par segmental left ventricular systolic dysfunction. LVEF \par calculated using biplane Pizarro's method is 27%. Akinesis \par of the apical, septal, inferior walls with remaining walls \par hypokinetic.  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 Unable to determine diastolic function due to \par mrerged E and A wave. \par 4. Severe right atrial enlargement.Right ventricular \par enlargement with decreased right ventricular systolic \par function. \par 5. Normal tricuspid valve. Mild tricuspid regurgitation. \par *** Compared with echocardiogram of 5/20/2022, there is no \par longer an LV thrombus and no pericardial effusion. \par \par 9/24/2022\par CONCLUSIONS:\par 1. Mitral annular calcification, otherwise normal mitral\par valve. Moderate mitral regurgitation.\par 2. Severe left ventricular enlargement.\par 3. Severe global left ventricularsystolic dysfunction.\par 4. Moderate diastolic dysfunction (Stage II).\par 5. Right ventricular enlargement with normal right\par ventricular systolic function.\par 6. Estimated pulmonary artery systolic pressure equals 82\par mm Hg, assuming right atrial pressureequals 10 mm Hg,\par consistent with severe pulmonary hypertension. [de-identified] : Cardiac Viability (05.12.22 @ 15:17)\par NUCLEAR FINDINGS:\par The left ventricle was normal in size. The mid to distal\par anterior, mid to distalanteroseptal, apical, and inferior\par walls do not demonstrate significant viability. The\par septum demonstrates mild viability. The remaining segments\par are viable (predominantly the lateral wall only).\par ------------------------------------------------------------------------\par GATED ANALYSIS:\par Rest gated wall motion analysis was performed (LVEF = 29\par %;LVEDV = 205 ml.), revealing markedly reduced LV\par function with regional variation.\par ------------------------------------------------------------------------\par IMPRESSIONS:Abnormal Study\par * The left ventricle was normal in size. The mid to distal\par anterior, mid to distal anteroseptal, apical, and inferior\par walls do not demonstrate significant viability. The\par septum demonstrates mild viability. Theremaining segments\par are viable (predominantly the lateral wall only).\par * Rest gated wall motion analysis was performed (LVEF = 29\par %;LVEDV = 205 ml.), revealing markedly reduced LV\par function with regional variation.\par Conclusion:\par The left ventricle was normal in size. The mid to distal\par anterior, mid to distal anteroseptal, apical, and inferior\par walls do not demonstrate significant viability. The\par septum demonstrates mild viability. The remaining segments\par are viable (predominantly the lateral wall only). [de-identified] : Study Date: 05/11/2022 03:16PM Page 3 of 4\par \par Cardiac Catheterization (05.11.22 @ 15:16) >\par Intra-aortic Balloon Pump\par An intra-aortic balloon pump was inserted.\par Diagnostic Findings:\par Coronary Angiography\par The coronary circulation is right dominant.\par LM\par Left main artery: Angiography shows no disease.\par LAD\par Proximal left anterior descending: There is a 70 % stenosis. First\par diagonal: There is a 70 % stenosis.\par Patient: ELYSE MALAVE MRN: [id number]\par Study Date: 05/11/2022 03:16 PM Page 1 of 4\par CX\par Circumflex: Angiography shows no disease.\par RCA\par Mid right coronary artery: There is a 100 % stenosis.\par Exam Start Time: 03:16 PM\par Exam End Time: 03:53 PM\par Exam Duration: 37 min\par Patient: ELYSE MALAVE MRN: [id number]\par Study Date: 05/11/2022 03:16 PM Page 2 of 4\par Hemodynamic Pressures:\par Phase Location [mmHg] [mmHg]\par [mmHg] HR\par Baseline LV s 115\par ed 36 88\par Baseline Ao s 126 d 85\par m 102 103\par Baseline RV s 50\par ed 17 81\par Baseline PA s 56 d 31\par m 41 81\par Baseline PCW a 32 v 34\par m 31 77\par Baseline RA a 20 v 16\par m 15 95\par \par Oxygen Saturations\par Phase Location Hb Sat pO2\par Content Group\par Baseline AO 14 99\par 19.33 SA\par Baseline PA 14 59\par 11.48 PA\par \par Oxygen Saturation Average, Phase: Baseline\par PV Hb PV Sat PV pO2\par PV Content\par SA Hb 14.40 g/dL SA Sat 98.70 % SA pO2\par SA Content 19.33 %\par PA Hb 14.40 g/dL PA Sat 58.60 % PA pO2\par PA Content 11.48 %\par MV Hb MV Sat MV pO2\par MV Content\par Strokework:\par Phase: Baseline\par LVSW: 34.02 g*m LVSW (index):\par 15.96 g*m/m2\par RVSW: 15.84 g*m RVSW (index):\par 7.43 g*m/m2\par Flow Calculations, Phase: Baseline\par CO 3.63 l/min HR\par O2Insp\par CI 1.70 l/min/m2 PO2\par O2Exp\par SV VO2 285.00 ml/min\par O2(ExAir)\par SVI A-VO2\par Hb 14.40 gm/d\par Shunts:\par Qs 3.63 l/min Qs Index 1.70\par l/min/m2\par Qp 3.63 l/min Qp Index 1.70\par l/min/m2 Qp/Qs\par EPBF EPBF Index\par L-R L-R Index\par R-L R-L Index\par Systemic Resistances, Phase: Baseline\par SVR 1917.35 dyn*s/cm5 TVR\par 2247.93 dyn*s/cm5\par \par \par \par SVRI 4086.28 dyn*s*m2/cm5 TVRI 4790.81 dyn*s*m2/cm5\par SVR 23.97 MERCADO TVR\par 28.11 MERCADO\par SVRI 51.09 MERCADO*m2 TVRI\par 59.90 MERCADO*m2\par Pulmonary Resistances:\par .39 dyn*s/cm5 TPR\par 903.58 dyn*s/cm5\par PVRI 469.69 dyn*s*m2/cm5 TPRI\par 1925.72 dyn*s*m2/cm5\par PVR 2.76 MERCADO TPR\par 11.30 MERCADO\par PVRI 5.87 MERCADO*m2 TPRI\par 24.08 MERCADO*m2\par Ratios:\par PVR-SVR 0.11 TPR-TVR\par 0.4\par Shunts:\par venO2 CO\par R-L Shunt R-L Shunt\par Sammy. Method\par L-R Shunt L-R Shunt\par R-L Shunt R-L Shunt\par Sammy. Method\par \par Conscious sedation was administered and monitored by Cardiology\par nursing staff,\par under my direct supervision when applicable.

## 2023-05-17 ENCOUNTER — OUTPATIENT (OUTPATIENT)
Dept: OUTPATIENT SERVICES | Facility: HOSPITAL | Age: 78
LOS: 1 days | Discharge: HOME | End: 2023-05-17

## 2023-05-17 DIAGNOSIS — Z98.890 OTHER SPECIFIED POSTPROCEDURAL STATES: Chronic | ICD-10-CM

## 2023-05-17 DIAGNOSIS — C34.00 MALIGNANT NEOPLASM OF UNSPECIFIED MAIN BRONCHUS: ICD-10-CM

## 2023-06-01 ENCOUNTER — APPOINTMENT (OUTPATIENT)
Dept: HEMATOLOGY ONCOLOGY | Facility: CLINIC | Age: 78
End: 2023-06-01

## 2023-06-01 ENCOUNTER — LABORATORY RESULT (OUTPATIENT)
Age: 78
End: 2023-06-01

## 2023-06-01 ENCOUNTER — RESULT REVIEW (OUTPATIENT)
Age: 78
End: 2023-06-01

## 2023-06-01 LAB
BASOPHILS # BLD AUTO: 0.04 K/UL — SIGNIFICANT CHANGE UP (ref 0–0.2)
BASOPHILS NFR BLD AUTO: 0.6 % — SIGNIFICANT CHANGE UP (ref 0–2)
EOSINOPHIL # BLD AUTO: 0.4 K/UL — SIGNIFICANT CHANGE UP (ref 0–0.5)
EOSINOPHIL NFR BLD AUTO: 6.2 % — HIGH (ref 0–6)
HCT VFR BLD CALC: 35.8 % — LOW (ref 39–50)
HGB BLD-MCNC: 11.8 G/DL — LOW (ref 13–17)
IMM GRANULOCYTES NFR BLD AUTO: 0.3 % — SIGNIFICANT CHANGE UP (ref 0–0.9)
LYMPHOCYTES # BLD AUTO: 1.17 K/UL — SIGNIFICANT CHANGE UP (ref 1–3.3)
LYMPHOCYTES # BLD AUTO: 18.2 % — SIGNIFICANT CHANGE UP (ref 13–44)
MCHC RBC-ENTMCNC: 30.6 PG — SIGNIFICANT CHANGE UP (ref 27–34)
MCHC RBC-ENTMCNC: 33 G/DL — SIGNIFICANT CHANGE UP (ref 32–36)
MCV RBC AUTO: 93 FL — SIGNIFICANT CHANGE UP (ref 80–100)
MONOCYTES # BLD AUTO: 0.86 K/UL — SIGNIFICANT CHANGE UP (ref 0–0.9)
MONOCYTES NFR BLD AUTO: 13.4 % — SIGNIFICANT CHANGE UP (ref 2–14)
NEUTROPHILS # BLD AUTO: 3.94 K/UL — SIGNIFICANT CHANGE UP (ref 1.8–7.4)
NEUTROPHILS NFR BLD AUTO: 61.3 % — SIGNIFICANT CHANGE UP (ref 43–77)
NRBC # BLD: 0 /100 WBCS — SIGNIFICANT CHANGE UP (ref 0–0)
PLATELET # BLD AUTO: 154 K/UL — SIGNIFICANT CHANGE UP (ref 150–400)
RBC # BLD: 3.85 M/UL — LOW (ref 4.2–5.8)
RBC # FLD: 17.8 % — HIGH (ref 10.3–14.5)
RETICS #: 53.5 K/UL — SIGNIFICANT CHANGE UP (ref 25–125)
RETICS/RBC NFR: 1.4 % — SIGNIFICANT CHANGE UP (ref 0.5–2.5)
WBC # BLD: 6.43 K/UL — SIGNIFICANT CHANGE UP (ref 3.8–10.5)
WBC # FLD AUTO: 6.43 K/UL — SIGNIFICANT CHANGE UP (ref 3.8–10.5)

## 2023-06-05 ENCOUNTER — APPOINTMENT (OUTPATIENT)
Dept: HEMATOLOGY ONCOLOGY | Facility: CLINIC | Age: 78
End: 2023-06-05
Payer: MEDICARE

## 2023-06-05 VITALS
SYSTOLIC BLOOD PRESSURE: 86 MMHG | BODY MASS INDEX: 23.33 KG/M2 | RESPIRATION RATE: 16 BRPM | WEIGHT: 171.98 LBS | HEART RATE: 59 BPM | DIASTOLIC BLOOD PRESSURE: 66 MMHG | TEMPERATURE: 96.8 F | OXYGEN SATURATION: 96 %

## 2023-06-05 PROCEDURE — 99214 OFFICE O/P EST MOD 30 MIN: CPT

## 2023-06-05 NOTE — REVIEW OF SYSTEMS
[Anxiety] : anxiety [Negative] : Allergic/Immunologic [Shortness Of Breath] : no shortness of breath

## 2023-06-05 NOTE — ASSESSMENT
[FreeTextEntry1] : Lab work, CT chest report, 5/3/23 radiation oncology note note reviewed.\par Wife on speaker phone for discussion.\par #1) 9/2022-Stage IB (pT2aN0) adenocarcinoma of the lung, moderately differentiated–status post right upper lobectomy/lymph node sampling.  Intraoperative course complicated by cardiogenic shock.  PD-L1 () TPS 0%. ALK negative.  EGFR mutation analysis–+ EGFR mutation ([id number], CTG>CGG).\par \par Per NCCN guidelines, may consider observation with expectant surveillance or chemotherapy for high risk patients and osimertinib (if EGFR exon 19 deletion or L858R).  Examples of high risk features may include poorly differentiated tumors, vascular invasion, wedge resection, tumors greater than 4 cm, visceral pleural involvement or unknown lymph node status–these features do not apply in this case-in light of this and patient's comorbidities, favored expectant surveillance approach. \par Had discussed the EGFR mutation with potential implications in the future with treatment available if needed.\par \par 1/3/2023–CT angiogram of the chest–negative for PE.  Mildly increased moderate loculated right pleural effusion since 12/27/2022.  Indeterminant 1.7 cm apical posterior left upper lobe nodule, enlarged since 7/2022 (4 mm).\par \par 1/25/2023–PET/CT scan–hypermetabolic left upper lobe opacity highly suspicious for malignancy.  Small left pleural effusion.  No hypermetabolic hilar or mediastinal nodes.  Postsurgical changes in the right upper lung field.  No suspicious FDG avid lesion at the surgical site.  Right pleural effusion slightly decreased from the recent CT chest.  Contiguous foci of activity localizing to the vertebral ends of the left 10th-12th ribs with a new fracture in the 11th rib since the recent CT angiogram of the chest.  Pattern of abnormalities compatible with traumatic injury, however, in the presence of a hypermetabolic left upper lobe mass, need to follow closely.\par \par 1/9/2023-S/P Right thoracentesis, 850 ml fluid was drained. Path revealed negative for malignant cells. \par \par So, had enlarging left lung lesion suggestive of neoplastic disease–?second primary vs. met. lesion.  Patient saw thoracic surgery and was deemed not to be a surgical candidate in light of comorbidities.  He was referred to radiation oncology for radiation therapy-completed 3/1/2023-5 Fx/ 5000 cGy. Treatment Site: Left Lung.  Had discussed potential role for systemic therapy–note +EGFR mutation with right-sided tumor. However, F/U liquid biopsy at this time -1/31/2023-no therapies associated with the genomic findings in this sample. KATHY, 3 Muts/Mb. \par 5/1/2023-CT chest-Status post right upper lobectomy. No significant change in the 1.6 cm lobulated nodule in the left upper lobe when compared to previous exam. Once again, exact etiology is unclear.\par --Plan expectant surveillance at this time. Patient with multiple comorbidities. \par Next CT chest 7/31/23.\par \par #2) h/o anemia/thrombocytosis-suspect antecedent surgery contributed. h/o Intermittent mild epistaxis may contribute. h/o decreased iron sat.-PO iron supplement. Interval F/U lab work to be done. Should hematologic scenario worsen, can consider further evaluation.\par \par #3) F/U with cardiologist for elevated creatinine/BP med adjustment as needed; f/u with PCP for primary care issues.\par Note-advised to go to ED immediately if any light headedness, symptoms or concerns.\par \par Patient was given the opportunity to ask questions.  His questions have been answered to his apparent satisfaction at this time. \par

## 2023-06-05 NOTE — HISTORY OF PRESENT ILLNESS
[Disease: _____________________] : Disease: [unfilled] [T: ___] : T[unfilled] [N: ___] : N[unfilled] [AJCC Stage: ____] : AJCC Stage: [unfilled] [de-identified] : 77 year old male, never smoker, w/ hx of bipolar, BPH, SCC of leg, CHF, CAD, hospitalized on May 11-17/2022 for acute MI s/p cardiac Cath with no intervention (LHC showed 100% occlusion for right coronary artery and 70% occlusion of left anterior descending artery. The arteries were not intervened upon due to viability study showing no viability in the areas supplied by the occluded arteries), cardiogenic shock, found to have new 1x 1.3cm LV thrombus on Plavix and Coumadin (Coumadin completed on 08/26/2022), who f/u with cardiologist and c/o SOB, CXR showed RUL nodule.\par \par 7/11/2022-CT chest-3.5 cm part solid mass is noted in the right upper lobe as described above. Primary consideration is lung carcinoma.\par \par 7/28/2022–PET/CT scan–FDG avid partly solid right upper lung nodule suspicious for malignancy.  Small bilateral pleural effusion with minimal FDG avidity, nonspecific.  Nonspecific mildly FDG avid low-attenuation left adrenal nodule, possibly an adenoma.  Heterogeneous FDG activity in the soft tissue associated with surgical clips overlying the symphysis pubis, probably related from prior procedure.\par \par 9/14/2022–CT-guided biopsy of right upper lung mass–positive for malignant cells, adenocarcinoma.  PD-L1 () TPS 0%. ALK negative.  EGFR mutation analysis–+ EGFR mutation ([id number], CTG>CGG).\par \par 9/23/2022–Status post right VATS-right upper lobectomy-pathology revealed adenocarcinoma, acinar predominant, margin negative.  One level 10 lymph node excised and 5 interlobar lymph nodes negative for carcinoma.  Right pleural fluid negative for malignant cells.\par Intraoperative course complicated by cardiogenic shock requiring inotropic support and diuretics.\par \par 10/21/2020 2-2 cm lobulated opacity in the left upper lobe, new compared to prior exam.\par \par 1/3/2023–CT angiogram of the chest–negative for PE.  Mildly increased moderate loculated right pleural effusion since 12/27/2022.  Indeterminant 1.7 cm apical posterior left upper lobe nodule, enlarged since 7/2022 (4 mm).\par \par 1/25/2023–PET/CT scan–hypermetabolic left upper lobe opacity highly suspicious for malignancy.  Small left pleural effusion.  No hypermetabolic hilar or mediastinal nodes.  Postsurgical changes in the right upper lung field.  No suspicious FDG avid lesion at the surgical site.  Right pleural effusion slightly decreased from the recent CT chest.  Contiguous foci of activity localizing to the vertebral ends of the left 10th-12th ribs with a new fracture in the 11th rib since the recent CT angiogram of the chest.  Pattern of abnormalities compatible with traumatic injury, however, in the presence of a hypermetabolic left upper lobe mass, need to follow closely.\par \par 1/9/2023-S/P Right thoracentesis, 850 ml fluid was drained. Path revealed negative for malignant cells. \par \par 3/1/2023-Completed radiation therapy. Planned Dose: 5 Fx/ 5000 cGy. Treatment Site: Left Lung. [de-identified] : Moderately differentiated invasive acinar adenocarcinoma [de-identified] : 9/2022-PD-L1 () TPS 0%. ALK negative.  EGFR mutation analysis–+ EGFR mutation ([id number], CTG>CGG).\par 1/31/2023-Liquid biopsy-no therapies associated with the genomic findings in this sample. AKTHY, 3 Muts/Mb.  [de-identified] : Has cardiologist f/u scheduled.\par Then f/u with Dr. Alejandre.\par Going to [place] x 3 weeks.\par No SOB at rest.\par No current complaints of chest pain; no hemoptysis; no fevers.  No complaints of bone pain.  No GI complaints.\par Sees Dr. Yu regularly (Psych). \par Remains on Plavix.\par \par \par Has had COVID vaccines (Moderna) x 4.

## 2023-06-06 ENCOUNTER — NON-APPOINTMENT (OUTPATIENT)
Age: 78
End: 2023-06-06

## 2023-06-14 NOTE — PATIENT PROFILE ADULT - HOME ACCESSIBILITY CONCERNS
Bed in lowest position, wheels locked, appropriate side rails in place/Call bell, personal items and telephone in reach/Instruct patient to call for assistance before getting out of bed or chair/Non-slip footwear when patient is out of bed/[place] to call system/Physically safe environment - no spills, clutter or unnecessary equipment/Purposeful Proactive Rounding/Room/bathroom lighting operational, light cord in reach
none

## 2023-06-19 ENCOUNTER — NON-APPOINTMENT (OUTPATIENT)
Age: 78
End: 2023-06-19

## 2023-06-19 RX ORDER — SACUBITRIL AND VALSARTAN 24; 26 MG/1; MG/1
24-26 TABLET, FILM COATED ORAL
Qty: 180 | Refills: 3 | Status: DISCONTINUED | COMMUNITY
Start: 2023-01-17 | End: 2023-06-19

## 2023-06-20 RX ORDER — SACUBITRIL AND VALSARTAN 24; 26 MG/1; MG/1
24-26 TABLET, FILM COATED ORAL
Refills: 0 | Status: DISCONTINUED | COMMUNITY
End: 2023-06-20

## 2023-06-23 RX ORDER — FUROSEMIDE 40 MG/1
40 TABLET ORAL
Qty: 60 | Refills: 0 | Status: DISCONTINUED | COMMUNITY
Start: 2023-01-10 | End: 2023-06-23

## 2023-06-23 RX ORDER — BUMETANIDE 2 MG/1
2 TABLET ORAL
Qty: 60 | Refills: 0 | Status: DISCONTINUED | COMMUNITY
Start: 2023-03-01 | End: 2023-06-23

## 2023-07-07 ENCOUNTER — NON-APPOINTMENT (OUTPATIENT)
Age: 78
End: 2023-07-07

## 2023-07-07 ENCOUNTER — APPOINTMENT (OUTPATIENT)
Dept: CARDIOLOGY | Facility: CLINIC | Age: 78
End: 2023-07-07
Payer: MEDICARE

## 2023-07-07 VITALS
OXYGEN SATURATION: 97 % | BODY MASS INDEX: 23.98 KG/M2 | HEIGHT: 72 IN | HEART RATE: 67 BPM | WEIGHT: 177 LBS | DIASTOLIC BLOOD PRESSURE: 70 MMHG | SYSTOLIC BLOOD PRESSURE: 106 MMHG

## 2023-07-07 PROCEDURE — 93000 ELECTROCARDIOGRAM COMPLETE: CPT

## 2023-07-07 PROCEDURE — 99214 OFFICE O/P EST MOD 30 MIN: CPT

## 2023-07-08 NOTE — HISTORY OF PRESENT ILLNESS
[FreeTextEntry1] : This is a 78 y/o M with pmhx of Depression, bipolar, BPH, COVID Winter 2021(recovered), hx of STEMI and cardiogenic shock in may 2022, presented to the [facility] ED for new onset hemoptysis.  Known R lung\par nodule, suspicious for CA, pending IR biopsy. Patient with recent hospitalization where he had a STEMI 5/2022, found to have 100% occlusion RCA and 70% occlusion LAD, needed CCU for IABP support and then\par weaned off. Viability showing poor salvageability and so he was medically managed. Also during this time, TTE with EF 30%, LV thrombus for which he was started on Coumadin, DAPT. He was to continue Coumadin x 3 months, last dose up until 8/26 for which he was compliant.He was hospitalized at [facility] with hemoptysis . He underwent echo 8/25/22 that showed a cardiomyopathy and resolution of the left ventricular thrombus. I coordinated care with the Hospitalist service and warfarin was discontinued. He was discharged on aspirin alone.  He comes today for clarification of  his overall cardiovascular risk prior to a planned biopsy and surgical excision of a lung mass. He was advised to undergo a complete cardiac evaluation. He denies chest pains shortness of breath or loss of consciousnes.\par \par 1/24/2023\par John is seen today after prolonged hospitalization we reinstituted heart failure meds in the setting of possible metastatic carcinoma.  Concern was raised regarding edema and possible thrombosis\par \par 4/18/23\par \par 4/13/223 John was recently seen in rehab he felt fatigue his blood pressure was 90/54 and reported lightheadedness his blood pressure dropped to 78/50\par \par 6/19/23\par azalia Aquino bp 88/60. will make metoprolol once per day. hold bumetanide (prn dosing as noted) and entresto. Kenia/John will call with bp readings in am.\par \par 7/7/23 \par John returns from [place] he had occasional short lived bout of shortness of breath 2 to 3 days ago now resolved weight 178 on PRN dosing of diuretic bumetidine\par \par

## 2023-07-08 NOTE — ASSESSMENT
[FreeTextEntry1] : Without evidence for recent infarction overt heart failure or unstable angina and based upon satisfactory clinical exam, Mr Becerra  may undergo planned low risk needle biopsy at an  ASA class III anesthesia risk. Without evidence for recent infarction overt heart failure or unstable angina and based upon satisfactory clinical exam, Mr Becerra  may undergo planned high risk VATS RU Lobectomy at an  ASA class III anesthesia risk. We discussed recent myocardial event which poses increased risk along with severe left ventricular dysfunction and advanced coronary disease which both impose increased cardiovascular risk for high risk surgery , however the benefits of a diagnostic procedure along with surgical intervention outweigh the risk of the procedure given the concern over an advancing carcinoma now with hemoptysis.  Mr Becerra understands inherent risks and wishes to proceed. All routes remain hazardous. \par \par medication management for an ischemic cardiomyopathy\par Aspirin is held as of 9/9/22 , 5 days prior to a needle biopsy and resumed when able from an IR standpoint. Would consider continuing aspirin 81 mg post needle biopsy for planned VATS if OK from a surgical standpoint. Furosemide is continued at 40 mg per day and would continue atorvastatin 80 mg metoprolol succinate 100 mg and consider heart failure meds such as ACE inhibitor and spironolactone and Farxiga when stabilized surgically and hemodynamically. A defibrillator may also need to be considered if ejection fraction less than 30% after GDMT for 40 days\par \par 4/18/23\par we will decrease medications given relative hypotension.entresto is decreased to 1/2 tab twice a day. he is referred to DR Sheehan for possible ICD give EF <.3This represents a high amount of medical decision making based upon two or more more chronic illnesses  and drug evaluation and management and subspecialty referral dr Sheehan ep service LIJdiscussed with wife at bedside \par \par Addendum\par 9/17/2022\par Recent biopsy demonstrates adenocarcinoma\par \par Addendum 1/24/2023\par Sonogram rule out DVT is negative.  Discussed repeat echo.  I discussed pros and cons of dual antiplatelet therapy currently on aspirin Plavix would continue for now increased risk of bleeding discussed\par \par 7/7/23\par offof entresto. prn dosing bumetidine\par \par Medical necessity there is a high risk encounter based upon drug evaluation and management of high-risk medications such as ACE inhibitors in the setting of hypotension

## 2023-07-08 NOTE — CARDIOLOGY SUMMARY
[___] : [unfilled] [LVEF ___%] : LVEF [unfilled]% [de-identified] : 5/11/2022 normal sinus rhythm anteroseptal pattern anterior ST segment elevation \par 5/27/22 anterior mi AI lafb rsr pac\par rsr lafb qrsd 110 [de-identified] : 5/20/22 EF .3  LVT 1x1.3 cm decreased RV function RV Enlargement MARCOS \par 8/25/2022 cardiomyopathy with resolution of a left ventricular thrombus [de-identified] : minimal viability in infarcted territories [de-identified] : 5/11/22 lad .7 diag .7 RCA 1.00 circ normal

## 2023-07-31 ENCOUNTER — APPOINTMENT (OUTPATIENT)
Dept: CV DIAGNOSITCS | Facility: HOSPITAL | Age: 78
End: 2023-07-31
Payer: MEDICARE

## 2023-07-31 ENCOUNTER — OUTPATIENT (OUTPATIENT)
Dept: OUTPATIENT SERVICES | Facility: HOSPITAL | Age: 78
LOS: 1 days | End: 2023-07-31

## 2023-07-31 DIAGNOSIS — I50.22 CHRONIC SYSTOLIC (CONGESTIVE) HEART FAILURE: ICD-10-CM

## 2023-07-31 DIAGNOSIS — Z98.890 OTHER SPECIFIED POSTPROCEDURAL STATES: Chronic | ICD-10-CM

## 2023-07-31 PROCEDURE — 93306 TTE W/DOPPLER COMPLETE: CPT | Mod: 26

## 2023-08-08 ENCOUNTER — APPOINTMENT (OUTPATIENT)
Dept: NUCLEAR MEDICINE | Facility: CLINIC | Age: 78
End: 2023-08-08
Payer: MEDICARE

## 2023-08-08 ENCOUNTER — OUTPATIENT (OUTPATIENT)
Dept: OUTPATIENT SERVICES | Facility: HOSPITAL | Age: 78
LOS: 1 days | End: 2023-08-08
Payer: MEDICARE

## 2023-08-08 DIAGNOSIS — Z98.890 OTHER SPECIFIED POSTPROCEDURAL STATES: Chronic | ICD-10-CM

## 2023-08-08 DIAGNOSIS — Z00.8 ENCOUNTER FOR OTHER GENERAL EXAMINATION: ICD-10-CM

## 2023-08-08 PROCEDURE — 78815 PET IMAGE W/CT SKULL-THIGH: CPT | Mod: 26,PS,MH

## 2023-08-08 PROCEDURE — A9552: CPT

## 2023-08-08 PROCEDURE — 78815 PET IMAGE W/CT SKULL-THIGH: CPT | Mod: MH

## 2023-08-10 ENCOUNTER — APPOINTMENT (OUTPATIENT)
Dept: HEART FAILURE | Facility: CLINIC | Age: 78
End: 2023-08-10
Payer: MEDICARE

## 2023-08-10 ENCOUNTER — NON-APPOINTMENT (OUTPATIENT)
Age: 78
End: 2023-08-10

## 2023-08-10 VITALS
OXYGEN SATURATION: 96 % | SYSTOLIC BLOOD PRESSURE: 100 MMHG | DIASTOLIC BLOOD PRESSURE: 57 MMHG | HEIGHT: 72 IN | HEART RATE: 75 BPM

## 2023-08-10 VITALS — WEIGHT: 182 LBS | BODY MASS INDEX: 24.68 KG/M2

## 2023-08-10 PROCEDURE — 99214 OFFICE O/P EST MOD 30 MIN: CPT

## 2023-08-10 PROCEDURE — 93000 ELECTROCARDIOGRAM COMPLETE: CPT

## 2023-08-10 NOTE — HISTORY OF PRESENT ILLNESS
[FreeTextEntry1] : 78 year old Male with PMH of bipolar disorder, CAD/STEMI (5/2022) s/p R/LHC with IABP support which showed  mid %, LAD 70%, D1 70% and RA 15, PCWP 31, PA sat 59%, CO 3.63, CI 1.70, PVR 2.76; medical management recommended; TTE showed LV thrombus treated with Coumadin (completed 8/26/22) , HFrEF (EF 34%, LVIDd6.1, moderate TR and severe PH with repeat 11/28/22 with decline in LVEF 22%), RUL mass s/p R. VATS (9/23/2022) c/b cardiogenic shock requiring inotropic support and diuretics. Pertinent labs:AST 2768, ALT 1653 RHC: RA 15, PCWP 31, PA sat 59%, CO 3.63, CI 1.70, PVR.2.7. S/p thoracentesis 1/9/23. Last admission 1/3-/23-1/10/23 secondary to ADHF.  S/P RT 3/023. Pt is here for a follow up . Accompanied by his wife. Card:Dr. Walden.  Pt  is here for a follow up today with his wife after return from [place]. He was in the ED in [place] around 6/19/23 after a fall  with dizziness and said he may have irregular heart beat. B/P rang 98/58 weight 170 lbs and is taking bumex 1 mg daily. He was instructed to only take bumex 1 mg if weight is above 172 lbs.  He was seen bu his cardiologist Dr. Walden  7/7/23 and Entresto 12-13 mg bid was stopped due to low B/P. He had a PET-CT on 8/9 at [facility] and is waiting for results.    Currently, he has been feeling well. His weight was 179 lbs on 8/9 and he took a 1 mg bumex and is 176 lbs today and 182 lbs in office with clothes. He  taking bumex 1 mg daily and 1/2 dose Entresto 12-13 mg bid. Dosage had been decreased and then discontinued due to low blood pressure with no further episodes.  He is taking Farxiga 5 mg daily and is asking for it to be ordered mail order to receive a less expensive price.  States his appetite has improved and he feels he has gained some calorie weight . Home B/P range 105/62 and is  105/57 in office today.    7/31/23 TTE with LVEF 23%, LVIDD 6 cm, mod severe MR, mild diastolic dysfunction, severe LV systolic dysfunction, no LV thrombus, decreased RV systolic function with prior TTE during admission 1/4/23 with LVEF 27%, LVIDD 6.5 cm , no LV thrombus, severe LV systolic dysfunction, decreased RV systolic dysfunction, severe MR, mild TR.   Currently, he is able to walk for 25 minutes and can climb a flight of stairs without dyspnea. He sleeps with 2 pillows with no orthopnea but was restless at night and is taking mirtazapine 15 mg and lorazepam 0.5 mg as needed. Previously. he attended Cardiac rehab at the [facility] with increase in his endurance. He is adhering to a low salt diet and is drinking less than 2 liters of fluid per day  He denies chest pain, palpitations, dizziness/LH and syncope and he does not have an ICD. Of note, pt will be going to [place] 9/15-10/19/23.   Of note, he is  , non smoker, no ETOH, + family history of CAD in brothers; one with ICD and 3 V CABG, other with CHF, stents  Patient name: ELYSE MALAVE YOB: 1945   Age: 78 (M)   MR#: [id number] Study Date: 7/31/2023 Location: O/PSonographer: Ivon Cruz RDCS 2nd Sonographer: Glen Kinney M.D. Study quality: Technically good Referring Physician: VIPUL JEROME NP Blood Pressure: 101/61 mmHg Height: 183 cm Weight: 79 kg BSA: 2 m2 ------------------------------------------------------------------------ PROCEDURE: Transthoracic echocardiogram with 2-D, M-Mode and complete spectral and color flow Doppler. Intravenous ultrasound enhancing agent was administered for improved left ventricular endocardial border definition.  Following the intravenous injection of ultrasound enhancing agent, harmonic imaging was performed. INDICATION: Chronic systolic (congestive) heart failure (I50.22) ------------------------------------------------------------------------ DIMENSIONS: Dimensions:     Normal Values: LA:     4.0 cm    2.0 - 4.0 cm Ao:     3.6 cm    2.0 - 3.8 cm SEPTUM: 0.9 cm    0.6 - 1.2 cm PWT:    0.8 cm    0.6 - 1.1 cm LVIDd:  6.0 cm    3.0 - 5.6 cm LVIDs:  5.4 cm    1.8 - 4.0 cm Derived Variables: LVMI: 100 g/m2 RWT: 0.26 Fractional short: 10 % Ejection Fraction (Modified Pizarro Rule): 23 % ------------------------------------------------------------------------ OBSERVATIONS: Mitral Valve: Mitral annular calcification, otherwise normal mitral valve. Moderate-severe mitral regurgitation. Aortic Root: Normal aortic root. Aortic Valve: Calcified trileaflet aortic valve with normal opening. Mild aortic regurgitation.  Left Atrium: Moderately dilated left atrium.  LA volume index = 42 cc/m2. Left Ventricle: Severe global left ventricular systolic dysfunction.  Endocardial visualization enhanced with intravenous injection of echo contrast (Definity).  No LV thrombus seen. Mild left ventricular enlargement. Moderate diastolic dysfunction (Stage II). Right Heart: Severe right atrial enlargement. Right ventricular enlargement with decreased right ventricular systolic function. Normal tricuspid valve.  Mild-moderate tricuspid regurgitation. Normal pulmonic valve.  Mild pulmonic regurgitation. Pericardium/PleuraNormal pericardium with no pericardial effusion. Hemodynamic: Estimated right ventricular systolic pressure equals 59 mm Hg, assuming right atrial pressure equals 10 mm Hg, consistent with moderate pulmonary hypertension. ------------------------------------------------------------------------ CONCLUSIONS: 1. Mitral annular calcification, otherwise normal mitral valve. Moderate-severe mitral regurgitation. 2. Calcified trileaflet aortic valve with normal opening. Mild aortic regurgitation.  3. Moderately dilated left atrium.  LA volume index = 42 cc/m2. 4. Mild left ventricular enlargement. 5. Severe global left ventricular systolic dysfunction.  Endocardial visualization enhanced with intravenous injection of echo contrast (Definity).  No LV thrombus seen. 6. Moderate diastolic dysfunction (Stage II). 7. Severe right atrial enlargement. 8. Right ventricular enlargement with decreased right ventricular systolic function. 9. Estimated right ventricular systolic pressure equals 59 mm Hg, assuming right atrial pressure equals 10 mm Hg, consistent with moderate pulmonary hypertension. *** Compared with echocardiogram of 11/28/2022, no significant changes noted. ------------------------------------------------------------------------ Confirmed on  7/31/2023 - 16:33:07 by Nick Mead M.D., [facility], [facility]   Cardiac Catheterization (05.11.22 @ 15:16) > Intra-aortic Balloon Pump An intra-aortic balloon pump was inserted. Diagnostic Findings: Coronary Angiography The coronary circulation is right dominant. LM Left main artery: Angiography shows no disease. LAD Proximal left anterior descending: There is a 70 % stenosis. First diagonal: There is a 70 % stenosis. Patient: ELYSE MALAVE MRN: [id number] Study Date: 05/11/2022 03:16 PM Page 1 of 4 CX Circumflex: Angiography shows no disease. RCA Mid right coronary artery: There is a 100 % stenosis. Exam Start Time: 03:16 PM Exam End Time: 03:53 PM Exam Duration: 37 min Patient: ELYSE MALAVE MRN: [id number] Study Date: 05/11/2022 03:16 PM Page 2 of 4 Hemodynamic Pressures: Phase Location [mmHg] [mmHg] [mmHg] HR Baseline LV s 115 ed 36 88 Baseline Ao s 126 d 85 m 102 103 Baseline RV s 50 ed 17 81 Baseline PA s 56 d 31 m 41 81 Baseline PCW a 32 v 34 m 31 77 Baseline RA a 20 v 16 m 15 95  Oxygen Saturations Phase Location Hb Sat pO2 Content Group Baseline AO 14 99 19.33 SA Baseline PA 14 59 11.48 PA  Oxygen Saturation Average, Phase: Baseline PV Hb PV Sat PV pO2 PV Content SA Hb 14.40 g/dL SA Sat 98.70 % SA pO2 SA Content 19.33 % PA Hb 14.40 g/dL PA Sat 58.60 % PA pO2 PA Content 11.48 % MV Hb MV Sat MV pO2 MV Content Strokework: Phase: Baseline LVSW: 34.02 g*m LVSW (index): 15.96 g*m/m2 RVSW: 15.84 g*m RVSW (index): 7.43 g*m/m2 Flow Calculations, Phase: Baseline CO 3.63 l/min HR O2Insp CI 1.70 l/min/m2 PO2 O2Exp SV VO2 285.00 ml/min O2(ExAir) SVI A-VO2 Hb 14.40 gm/d Shunts: Qs 3.63 l/min Qs Index 1.70 l/min/m2 Qp 3.63 l/min Qp Index 1.70 l/min/m2 Qp/Qs EPBF EPBF Index L-R L-R Index R-L R-L Index Systemic Resistances, Phase: Baseline SVR 1917.35 dyn*s/cm5 TVR 2247.93 dyn*s/cm5  Patient: ELYSE MALAVE MRN: [id number] Study Date: 05/11/2022 03:16PM Page 3 of 4     SVRI 4086.28 dyn*s*m2/cm5 TVRI 4790.81 dyn*s*m2/cm5 SVR 23.97 MERCADO TVR 28.11 MERCADO SVRI 51.09 MERCADO*m2 TVRI 59.90 MERCADO*m2 Pulmonary Resistances: .39 dyn*s/cm5 .58 dyn*s/cm5 PVRI 469.69 dyn*s*m2/cm5 TPRI 1925.72 dyn*s*m2/cm5 PVR 2.76 MERCADO TPR 11.30 MERCADO PVRI 5.87 MERCADO*m2 TPRI 24.08 MERCADO*m2 Ratios: PVR-SVR 0.11 TPR-TVR 0.4 Shunts: venO2 CO R-L Shunt R-L Shunt Sammy. Method L-R Shunt L-R Shunt R-L Shunt R-L Shunt Sammy. Method  Conscious sedation was administered and monitored by Cardiology nursing staff, under my direct supervision when applicable.  Electronically signed by Stew Helms MD on 05/11/2022 at 05:17 PM   Cardiac Viability (05.12.22 @ 15:17) NUCLEAR FINDINGS: The left ventricle was normal in size. The mid to distal anterior, mid to distalanteroseptal, apical, and inferior walls do not demonstrate significant viability. The septum demonstrates mild viability. The remaining segments are viable (predominantly the lateral wall only). ------------------------------------------------------------------------ GATED ANALYSIS: Rest gated wall motion analysis was performed (LVEF = 29 %;LVEDV = 205 ml.), revealing markedly reduced LV function with regional variation. ------------------------------------------------------------------------ IMPRESSIONS:Abnormal Study * The left ventricle was normal in size. The mid to distal anterior, mid to distal anteroseptal, apical, and inferior walls do not demonstrate significant viability. The septum demonstrates mild viability. Theremaining segments are viable (predominantly the lateral wall only). * Rest gated wall motion analysis was performed (LVEF = 29 %;LVEDV = 205 ml.), revealing markedly reduced LV function with regional variation. Conclusion: The left ventricle was normal in size. The mid to distal anterior, mid to distal anteroseptal, apical, and inferior walls do not demonstrate significant viability. The septum demonstrates mild viability. The remaining segments are viable (predominantly the lateral wall only). ------------------------------------------------------------------------     Assessment and Plan: - Assessment  77 year old Male with PMH of bipolar disorder, CAD/STEMI (5/2022) s/p R/LHC with IABP support which showed  mid %, LAD 70%, D1 70% and RA 15, PCWP 31, PA sat 59%, CO 3.63, CI 1.70, PVR 2.76; medical management recommended; TTE showed LV thrombus treated with Coumadin, HFrEF (EF 34%, LVIDd 6.1, moderate TR and severe PH), RUL mass s/p R. VATS (9/23/2022) c/b cardiogenic shock requiring inotropic support and diuretics. Pertinent labs: AST 2768, ALT 1653 RHC: RA 15, PCWP 31, PA sat 59%, CO 3.63, CI 1.70, PVR 2.76. 9/28 Dobutamine weaned off and hemodynamically stable 9/29 Lactate = 1.3 and VSat = 53.9% CO/CI 3.2/1.5 BNP decreased to 3,514 AST/ALT decreased to 351/652      Problem/Plan -  Attestation Statements: Attestation Statements: Attending and PA/NP shared services statement (NON-critical care):  Attending to bill.  I independently performed the documented:  Medical decision making.                           PROCEDURE DATE:  10/01/2022  CXR INTERPRETATION:  INDICATION: S/P VATS COMPARISON: 9/30/22 Technique: AP radiograph of the chest FINDINGS: S/P removal of right IJ line. Heart/Vascular: Stable mild cardiomegaly. Pulmonary: New, small pleural effusion. Remainder of the lungs are clear.  No focal infiltrate. No pneumothorax Bones: No acute bony finding Impression: New, small right pleural effusion.

## 2023-08-10 NOTE — ASSESSMENT
[FreeTextEntry1] : 78 year old Male with PMH of bipolar disorder, CAD/STEMI (5/2022) s/p R/LHC with IABP support which showed  mid %, LAD 70%, D1 70% and RA 15, PCWP 31, PA sat 59%, CO 3.63, CI 1.70, PVR 2.76; medical management recommended; TTE showed LV thrombus treated with Coumadin (completed 8/26/22) , HFrEF (EF 34%, LVIDd6.1, moderate TR and severe PH with repeat 11/28/22 with decline in LVEF 22%), RUL mass s/p R. VATS (9/23/2022) c/b cardiogenic shock requiring inotropic support and diuretics. Pertinent labs:AST 2768, ALT 1653 RHC: RA 15, PCWP 31, PA sat 59%, CO 3.63, CI 1.70, PVR.2.7. S/p thoracentesis 1/9/23. Last admission 1/3-/23-1/10/23 secondary to ADHF. S/P RT   ACC/AHA Stage, NYHA Class II  symptoms Appears mildly volume over and low normotensive

## 2023-08-10 NOTE — DISCUSSION/SUMMARY
[Patient] : the patient [___ Week(s)] : in [unfilled] week(s) [FreeTextEntry2] : wife [FreeTextEntry1] : 1. Chronic systolic congestive heart failure. LVEF 27% ( TTE 1/4/23) with repeat 7/31/23 23% - continue  Entresto 24-26 mg ( 1/2 tab 12-13 mg)  bid - continue Farxiga 5 mg dally  - change bumex  to 1 mg twice a week on Tuesday and Thursday and 0.5 mg 5 days a week with additional  as needed for weight gain of 2-3 lbs in 2-3 days .Goal weight 175 lbs or less  - continue Toprol XL 25 mg bid  - continue brenda 12.5 mg daily - continue Daily weight and B/P log - repeat labs on 9/7 and will consider adding back losartan 12.5 mg daily. Entresto 12-13 mg bid d/c due to low B/P and dizziness - limit salt to less than 2000 mg per day - limit fluid to less than 2 liters per day. He was drinking < 48 oz per day - repeat TTE with LVEF 23%, will  consider ICD placement but wants to get results of PET CT done on 8/9/23.   2. CAD - no active chest pain - medical management of  mid %, LAD 70%, D1 70% -continue plavix, ASA, statin and beta blocker metoprolol  3. Elevated HgA1C/ DM - continue Farxiga 5 mg daily,  Follow up in office in 3 months, will call with any change in symptoms. [EKG obtained to assist in diagnosis and management of assessed problem(s)] : EKG obtained to assist in diagnosis and management of assessed problem(s)

## 2023-08-10 NOTE — PHYSICAL EXAM
[Well Nourished] : well nourished [No Acute Distress] : no acute distress [Normal Conjunctiva] : normal conjunctiva [Normal S1, S2] : normal S1, S2 [Good Air Entry] : good air entry [No Respiratory Distress] : no respiratory distress  [Soft] : abdomen soft [Non Tender] : non-tender [Normal Gait] : normal gait [No Rash] : no rash [Normal] : alert and oriented, normal memory [Alert and Oriented] : alert and oriented [No Edema] : no edema [de-identified] : JVP 8-10 cm  [de-identified] : left base crackles

## 2023-08-10 NOTE — CARDIOLOGY SUMMARY
[de-identified] : 8/10/23 NSR 67 with 1 st degree AVB 63. LAFB, PRWP, NSST 5/4/23 NSR 67 with 1 st degree AVB 63. LAFB, PRWP, NSST 3/6/23 NSR with 1 st degree AVB 63. LAFB, PRWP, NSST 1/26/23 NSR with 1 st degree AVB 74. LAFB, PRWP, NSST 11/28/22 NSR with 1 st degree AVB 70. LAFB, PRWP, NSST 10/27/22  NSR 71,  LAFB, PRWP, NSST  10/13/22 NSR 98, LAFB, PRWP, NSST with PVC's [de-identified] : 7/31/23 TTE with LVEF 23%, LVIDD 6 cm, mod severe MR, mild diastolic dysfunction, severe LV systolic dysfunction, no LV thrombus, decreased RV systolic function  TTE 1/4/23 with LVEF 27%, LVIDD 6.5 cm , no LV thrombus, severe LV systolic dysfunction, decreased RV systolic dysfunction, severe MR, mild TR.    11/28/22 TTE; LVEF 22%, LVIDD 6.1 cm, mod MR, severe global LV systolic dysfunction, moderate diastolic dysfunction Stage II, with decreased RV systolic function, severe pHTN  9/24/22 TTE; LVEF 34%, LVIDD 6.1 cm, mod MR, mild AR, normal LA, severe global LV systolic dysfunction, moderate diastolic dysfunction Stage II, normal RV systolic function. mod TR, RV systolic function 82 mmHg c/q severe pHTN [de-identified] : The left ventricle was normal in size. The mid to distal\par  anterior, mid to distal anteroseptal, apical, and inferior\par  walls do not demonstrate significant viability. The\par  septum demonstrates mild viability. The remaining segments\par  are viable (predominantly the lateral wall only).\par  He had a viability study with reported left ventricle was normal in size. The mid to distal anterior, mid to distal anteroseptal, apical, and inferior walls do not demonstrate significant viability. The septum demonstrates mild viability. The remaining segments are viable (predominantly the lateral wall only). Discussed with interventional card Dr. Helms and opted to manage medically. Since there has been a decline in LVEF to 225, it was decided to repeat Cardiac viability to see if there were any areas that could be revascularized. [de-identified] : 9/2022 RHC: RA 15, PCWP 31, PA sat 59%, CO 3.63, CI 1.70, PVR 2.76.\par  9/2022:  s/p R/LHC with IABP support which showed  mid %, LAD 70%, D1 70% and RA 15,\par  PCWP 31, PA sat 59%, CO 3.63, CI 1.70, PVR 2.76; medical management\par  \par  Cardiac Catheterization (05.11.22 @ 15:16) >\par  Intra-aortic Balloon Pump\par  An intra-aortic balloon pump was inserted.\par  Diagnostic Findings:\par  Coronary Angiography\par  The coronary circulation is right dominant.\par  LM\par  Left main artery: Angiography shows no disease.\par  LAD\par  Proximal left anterior descending: There is a 70 % stenosis. First\par  diagonal: There is a 70 % stenosis.\par  Patient: ELYSE MALAVE MRN: [id number]\par  Study Date: 05/11/2022 03:16 PM Page 1 of 4\par  CX\par  Circumflex: Angiography shows no disease.\par  RCA\par  Mid right coronary artery: There is a 100 % stenosis.\par  Exam Start Time: 03:16 PM\par  Exam End Time: 03:53 PM\par  Exam Duration: 37 min\par  Patient: ELYSE MALAVE MRN: [id number]\par  Study Date: 05/11/2022 03:16 PM Page 2 of 4\par  Hemodynamic Pressures:\par  Phase Location [mmHg] [mmHg]\par  [mmHg] HR\par  Baseline LV s 115\par  ed 36 88\par  Baseline Ao s 126 d 85\par  m 102 103\par  Baseline RV s 50\par  ed 17 81\par  Baseline PA s 56 d 31\par  m 41 81\par  Baseline PCW a 32 v 34\par  m 31 77\par  Baseline RA a 20 v 16\par  m 15 95\par  \par   [de-identified] : 1/25/23 doppler of legs with no DVT bilaterally  [de-identified] : PET/CT 1/25/23. Dr. Au reviewed results with patient and his wife notable for hypermetabolic HARRY opacity highly suspicious for malignancy, small left pleural effusion.  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 small left pleural effusion on   1/3/2023- CTA chest showing mildly loculated R pleural effusion and a 1.7cm L upper lobe  pulmonary nodule

## 2023-08-17 ENCOUNTER — APPOINTMENT (OUTPATIENT)
Dept: RADIATION ONCOLOGY | Facility: CLINIC | Age: 78
End: 2023-08-17
Payer: MEDICARE

## 2023-08-17 VITALS
BODY MASS INDEX: 24.93 KG/M2 | HEART RATE: 81 BPM | DIASTOLIC BLOOD PRESSURE: 68 MMHG | TEMPERATURE: 97.7 F | SYSTOLIC BLOOD PRESSURE: 102 MMHG | HEIGHT: 72 IN | OXYGEN SATURATION: 97 % | WEIGHT: 184.09 LBS

## 2023-08-17 PROCEDURE — 99213 OFFICE O/P EST LOW 20 MIN: CPT | Mod: GC

## 2023-08-17 NOTE — PHYSICAL EXAM
[General Appearance - Well Developed] : well developed [General Appearance - Well Nourished] : well nourished [Sclera] : the sclera and conjunctiva were normal [Extraocular Movements] : extraocular movements were intact [Outer Ear] : the ears and nose were normal in appearance [Hearing Threshold Finger Rub Not [place]] : hearing was normal [Examination Of The Oral Cavity] : the lips and gums were normal [Skin Color & Pigmentation] : normal skin color and pigmentation [] : no rash [No Focal Deficits] : no focal deficits [Normal] : oriented to person, place and time, the affect was normal, the mood was normal and not anxious

## 2023-08-17 NOTE — HISTORY OF PRESENT ILLNESS
[FreeTextEntry1] : Mr. Becerra is a 77 year old man with history of lung cancer. 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 He has extensive cardiac comorbidities and intraoperative course was complicated by cardiogenic shock.  Subsequent imaging detected what appears to be a contralateral new primary lung cancer vs. contralateral metastasis -- 1.7cm apical posterior HARRY nodule detected on CTA 1/3/23, confirmed to be hypermetabolic on PET CT of 1/25/23. No pathologic diagnosis of the HARRY nodule has been made. At time of presentation he is physically asymptomatic apart from OGLESBY, although anxious about his diagnosis. He has been deemed a poor candidate for further surgery.  Referred for consideration of definitive radiation therapy for new clinical stage I primary cancer of the left lung vs. small contralateral oligometastasis. Patient received  50 Gy in 5 fractions to the left lung from 2/17/2023- 3/1/2023.  5/3/2023: Patient presents to clinic for PTE s/p 50 Gy in 5 fractions to the left lung from 2/17/2023- 3/1/2023, patient tolerated treatments well. Patient had CT-Chest performed 5/1/2023. Report pending. Patient reports doing well, OGLESBY and SOB much improved, back to swimming 25 min per day. States been able to mostly resume all pre diagnosis activities. Denies dysphasia, dyspepsia or esophagitis symptoms.

## 2023-08-17 NOTE — DISEASE MANAGEMENT
[Clinical] : TNM Stage: c [IA2] : IA2 [TTNM] : 1 [NTNM] : 0 [MTNM] : 0 [de-identified] : 3 fx/ 3000 cGy [de-identified] : 5 Fx/ 5000 cGy [de-identified] : Left Lung

## 2023-08-21 NOTE — REASON FOR VISIT
[Post-Treatment Evaluation] : post-treatment evaluation for [Lung Cancer] : lung cancer [Spouse] : spouse [Routine Follow-Up] : routine follow-up visit for

## 2023-08-21 NOTE — HISTORY OF PRESENT ILLNESS
[FreeTextEntry1] : Mr. Becerra is a 77 year old man with history of lung cancer. 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 He has extensive cardiac comorbidities and intraoperative course was complicated by cardiogenic shock.  Subsequent imaging detected what appears to be a contralateral new primary lung cancer vs. contralateral metastasis -- 1.7cm apical posterior HARRY nodule detected on CTA 1/3/23, confirmed to be hypermetabolic on PET CT of 1/25/23. No pathologic diagnosis of the HARRY nodule has been made. At time of presentation he is physically asymptomatic apart from OGLESBY, although anxious about his diagnosis. He has been deemed a poor candidate for further surgery.  Referred for consideration of definitive radiation therapy for new clinical stage I primary cancer of the left lung vs. small contralateral oligometastasis. Patient received 50 Gy in 5 fractions to the left lung from 2/17/2023- 3/1/2023.  5/3/2023: Patient presents to clinic for PTE s/p 50 Gy in 5 fractions to the left lung from 2/17/2023- 3/1/2023, patient tolerated treatments well. Patient had CT-Chest performed 5/1/2023. Report pending. Patient reports doing well, OGLESBY and SOB much improved, back to swimming 25 min per day. States been able to mostly resume all pre diagnosis activities. Denies dysphasia, dyspepsia or esophagitis symptoms.   8/17/2023: Patient presents for follow up. PET-CT perforemd 8/8/2023 Impression: Redemonstration of a left upper lobe lobulated lung nodule that is stable in size (anatomically) and has slightly decreased metabolically (FDG uptake). Continued follow-up as clinically indicated.  He is doing well.  Denies new c/o.

## 2023-08-21 NOTE — DISEASE MANAGEMENT
[Clinical] : TNM Stage: c [IA2] : IA2 [TTNM] : 1 [NTNM] : 0 [MTNM] : 0 [de-identified] : 3 fx/ 3000 cGy [de-identified] : 5 Fx/ 5000 cGy [de-identified] : Left Lung

## 2023-08-21 NOTE — REVIEW OF SYSTEMS
[Dyspepsia: Grade 0] : Dyspepsia: Grade 0 [Dysphagia: Grade 0] : Dysphagia: Grade 0 [Esophagitis: Grade 0] : Esophagitis: Grade 0 [Nausea: Grade 0] : Nausea: Grade 0 [Vomiting: Grade 0] : Vomiting: Grade 0 [Fatigue: Grade 0] : Fatigue: Grade 0 [Cognitive Disturbance: Grade 0] : Cognitive Disturbance: Grade 0 [Dizziness: Grade 0] : Dizziness: Grade 0  [Headache: Grade 0] : Headache: Grade 0 [Dyspnea: Grade 0] : Dyspnea: Grade 0 [Skin Hyperpigmentation: Grade 0] : Skin Hyperpigmentation: Grade 0 [Dermatitis Radiation: Grade 0] : Dermatitis Radiation: Grade 0 [SOB on Exertion] : shortness of breath during exertion [Negative] : Heme/Lymph [Chest Pain] : no chest pain

## 2023-08-28 ENCOUNTER — OUTPATIENT (OUTPATIENT)
Dept: OUTPATIENT SERVICES | Facility: HOSPITAL | Age: 78
LOS: 1 days | Discharge: HOME | End: 2023-08-28

## 2023-08-28 DIAGNOSIS — Z98.890 OTHER SPECIFIED POSTPROCEDURAL STATES: Chronic | ICD-10-CM

## 2023-08-28 DIAGNOSIS — C34.00 MALIGNANT NEOPLASM OF UNSPECIFIED MAIN BRONCHUS: ICD-10-CM

## 2023-09-07 ENCOUNTER — LABORATORY RESULT (OUTPATIENT)
Age: 78
End: 2023-09-07

## 2023-09-07 ENCOUNTER — APPOINTMENT (OUTPATIENT)
Dept: HEMATOLOGY ONCOLOGY | Facility: CLINIC | Age: 78
End: 2023-09-07

## 2023-09-07 ENCOUNTER — RESULT REVIEW (OUTPATIENT)
Age: 78
End: 2023-09-07

## 2023-09-07 LAB
BASOPHILS # BLD AUTO: 0.07 K/UL — SIGNIFICANT CHANGE UP (ref 0–0.2)
BASOPHILS NFR BLD AUTO: 1.1 % — SIGNIFICANT CHANGE UP (ref 0–2)
EOSINOPHIL # BLD AUTO: 0.35 K/UL — SIGNIFICANT CHANGE UP (ref 0–0.5)
EOSINOPHIL NFR BLD AUTO: 5.7 % — SIGNIFICANT CHANGE UP (ref 0–6)
HCT VFR BLD CALC: 39.5 % — SIGNIFICANT CHANGE UP (ref 39–50)
HGB BLD-MCNC: 13 G/DL — SIGNIFICANT CHANGE UP (ref 13–17)
IMM GRANULOCYTES NFR BLD AUTO: 0.8 % — SIGNIFICANT CHANGE UP (ref 0–0.9)
LYMPHOCYTES # BLD AUTO: 0.83 K/UL — LOW (ref 1–3.3)
LYMPHOCYTES # BLD AUTO: 13.5 % — SIGNIFICANT CHANGE UP (ref 13–44)
MCHC RBC-ENTMCNC: 31.8 PG — SIGNIFICANT CHANGE UP (ref 27–34)
MCHC RBC-ENTMCNC: 32.9 G/DL — SIGNIFICANT CHANGE UP (ref 32–36)
MCV RBC AUTO: 96.6 FL — SIGNIFICANT CHANGE UP (ref 80–100)
MONOCYTES # BLD AUTO: 0.96 K/UL — HIGH (ref 0–0.9)
MONOCYTES NFR BLD AUTO: 15.6 % — HIGH (ref 2–14)
NEUTROPHILS # BLD AUTO: 3.91 K/UL — SIGNIFICANT CHANGE UP (ref 1.8–7.4)
NEUTROPHILS NFR BLD AUTO: 63.3 % — SIGNIFICANT CHANGE UP (ref 43–77)
NRBC # BLD: 0 /100 WBCS — SIGNIFICANT CHANGE UP (ref 0–0)
PLATELET # BLD AUTO: 168 K/UL — SIGNIFICANT CHANGE UP (ref 150–400)
RBC # BLD: 4.09 M/UL — LOW (ref 4.2–5.8)
RBC # FLD: 17.5 % — HIGH (ref 10.3–14.5)
RETICS #: 137.4 K/UL — HIGH (ref 25–125)
RETICS/RBC NFR: 3.4 % — HIGH (ref 0.5–2.5)
WBC # BLD: 6.17 K/UL — SIGNIFICANT CHANGE UP (ref 3.8–10.5)
WBC # FLD AUTO: 6.17 K/UL — SIGNIFICANT CHANGE UP (ref 3.8–10.5)

## 2023-09-08 NOTE — ASSESSMENT
[FreeTextEntry1] : Lab work, CT chest report, 8/17/23 radiation oncology note note reviewed. Wife on speaker phone for discussion. #1) 9/2022-Stage IB (pT2aN0) adenocarcinoma of the lung, moderately differentiated-status post right upper lobectomy/lymph node sampling.  Intraoperative course complicated by cardiogenic shock.  PD-L1 () TPS 0%. ALK negative.  EGFR mutation analysis-+ EGFR mutation ([id number], CTG>CGG).  Per NCCN guidelines, may consider observation with expectant surveillance or chemotherapy for high risk patients and osimertinib (if EGFR exon 19 deletion or L858R).  Examples of high risk features may include poorly differentiated tumors, vascular invasion, wedge resection, tumors greater than 4 cm, visceral pleural involvement or unknown lymph node status-these features do not apply in this case-in light of this and patient's comorbidities, favored expectant surveillance approach.  Had discussed the EGFR mutation with potential implications in the future with treatment available if needed.  1/3/2023-CT angiogram of the chest-negative for PE.  Mildly increased moderate loculated right pleural effusion since 12/27/2022.  Indeterminant 1.7 cm apical posterior left upper lobe nodule, enlarged since 7/2022 (4 mm). 1/9/2023-S/P Right thoracentesis, 850 ml fluid was drained. Path revealed negative for malignant cells.   1/25/2023-PET/CT scan-hypermetabolic left upper lobe opacity highly suspicious for malignancy.  Small left pleural effusion.  No hypermetabolic hilar or mediastinal nodes.  Postsurgical changes in the right upper lung field.  No suspicious FDG avid lesion at the surgical site.  Right pleural effusion slightly decreased from the recent CT chest.  Contiguous foci of activity localizing to the vertebral ends of the left 10th-12th ribs with a new fracture in the 11th rib since the recent CT angiogram of the chest.  Pattern of abnormalities compatible with traumatic injury, however, in the presence of a hypermetabolic left upper lobe mass, need to follow closely.  So, had enlarging left lung lesion suggestive of neoplastic disease-?second primary vs. met. lesion.  Patient saw thoracic surgery and was deemed not to be a surgical candidate in light of comorbidities.  He was referred to radiation oncology for radiation therapy-completed 3/1/2023-5 Fx/ 5000 cGy. Treatment Site: Left Lung.  Had discussed potential role for systemic therapy-note +EGFR mutation with right-sided tumor. However, F/U liquid biopsy at this time -1/31/2023-no therapies associated with the genomic findings in this sample. KATHY, 3 Muts/Mb.   5/1/2023-CT chest-Status post right upper lobectomy. No significant change in the 1.6 cm lobulated nodule in the left upper lobe when compared to previous exam. Once again, exact etiology is unclear. --Plan expectant surveillance at this time. Patient with multiple comorbidities.  Next CT chest 12/2023 per radiation oncology.  #2) h/o anemia/thrombocytosis-suspect antecedent surgery contributed. h/o Intermittent mild epistaxis may contribute. h/o decreased iron sat., now increased-hold PO iron supplement. Interval F/U lab work to be done. Should hematologic scenario worsen, can consider further evaluation.  #3) F/U with cardiologist for elevated creatinine/BP med adjustment as needed; f/u with PCP for primary care issues.  Patient was given the opportunity to ask questions.  His questions have been answered to his apparent satisfaction at this time.   -->RTO 4 months.

## 2023-09-08 NOTE — HISTORY OF PRESENT ILLNESS
[Disease: _____________________] : Disease: [unfilled] [T: ___] : T[unfilled] [N: ___] : N[unfilled] [AJCC Stage: ____] : AJCC Stage: [unfilled] [de-identified] : 77 year old male, never smoker, w/ hx of bipolar, BPH, SCC of leg, CHF, CAD, hospitalized on May 11-17/2022 for acute MI s/p cardiac Cath with no intervention (LHC showed 100% occlusion for right coronary artery and 70% occlusion of left anterior descending artery. The arteries were not intervened upon due to viability study showing no viability in the areas supplied by the occluded arteries), cardiogenic shock, found to have new 1x 1.3cm LV thrombus on Plavix and Coumadin (Coumadin completed on 08/26/2022), who f/u with cardiologist and c/o SOB, CXR showed RUL nodule.  7/11/2022-CT chest-3.5 cm part solid mass is noted in the right upper lobe as described above. Primary consideration is lung carcinoma.  7/28/2022-PET/CT scan-FDG avid partly solid right upper lung nodule suspicious for malignancy.  Small bilateral pleural effusion with minimal FDG avidity, nonspecific.  Nonspecific mildly FDG avid low-attenuation left adrenal nodule, possibly an adenoma.  Heterogeneous FDG activity in the soft tissue associated with surgical clips overlying the symphysis pubis, probably related from prior procedure.  9/14/20225702-[id number] biopsy of right upper lung mass-positive for malignant cells, adenocarcinoma.  PD-L1 () TPS 0%. ALK negative.  EGFR mutation analysis-+ EGFR mutation ([id number], CTG>CGG).  9/23/2022-Status post right VATS-right upper lobectomy-pathology revealed adenocarcinoma, acinar predominant, margin negative.  One level 10 lymph node excised and 5 interlobar lymph nodes negative for carcinoma.  Right pleural fluid negative for malignant cells. Intraoperative course complicated by cardiogenic shock requiring inotropic support and diuretics.  10/21/2020 2-2 cm lobulated opacity in the left upper lobe, new compared to prior exam.  1/3/2023-CT angiogram of the chest-negative for PE.  Mildly increased moderate loculated right pleural effusion since 12/27/2022.  Indeterminant 1.7 cm apical posterior left upper lobe nodule, enlarged since 7/2022 (4 mm).  1/25/2023-PET/CT scan-hypermetabolic left upper lobe opacity highly suspicious for malignancy.  Small left pleural effusion.  No hypermetabolic hilar or mediastinal nodes.  Postsurgical changes in the right upper lung field.  No suspicious FDG avid lesion at the surgical site.  Right pleural effusion slightly decreased from the recent CT chest.  Contiguous foci of activity localizing to the vertebral ends of the left 10th-12th ribs with a new fracture in the 11th rib since the recent CT angiogram of the chest.  Pattern of abnormalities compatible with traumatic injury, however, in the presence of a hypermetabolic left upper lobe mass, need to follow closely.  1/9/2023-S/P Right thoracentesis, 850 ml fluid was drained. Path revealed negative for malignant cells.   3/1/2023-Completed radiation therapy. Planned Dose: 5 Fx/ 5000 cGy. Treatment Site: Left Lung. [de-identified] : Moderately differentiated invasive acinar adenocarcinoma [de-identified] : 9/2022-PD-L1 () TPS 0%. ALK negative.  EGFR mutation analysis-+ EGFR mutation ([id number], CTG>CGG).\par  1/31/2023-Liquid biopsy-no therapies associated with the genomic findings in this sample. KATHY, 3 Muts/Mb.  [de-identified] :   --Has cardiologist f/u scheduled. Then f/u with Dr. Alejandre. Going to [place] x 3 weeks. No SOB at rest. No current complaints of chest pain; no hemoptysis; no fevers.  No complaints of bone pain.  No GI complaints. Sees Dr. Yu regularly (Psych).  Remains on Plavix.   Has had COVID vaccines (Moderna) x 4.

## 2023-09-11 ENCOUNTER — APPOINTMENT (OUTPATIENT)
Dept: HEMATOLOGY ONCOLOGY | Facility: CLINIC | Age: 78
End: 2023-09-11
Payer: MEDICARE

## 2023-09-11 VITALS
SYSTOLIC BLOOD PRESSURE: 114 MMHG | WEIGHT: 179.02 LBS | HEART RATE: 89 BPM | BODY MASS INDEX: 24.25 KG/M2 | OXYGEN SATURATION: 98 % | DIASTOLIC BLOOD PRESSURE: 71 MMHG | TEMPERATURE: 97.4 F | HEIGHT: 71.97 IN | RESPIRATION RATE: 16 BRPM

## 2023-09-11 PROCEDURE — 99213 OFFICE O/P EST LOW 20 MIN: CPT

## 2023-09-27 ENCOUNTER — RESULT CHARGE (OUTPATIENT)
Age: 78
End: 2023-09-27

## 2023-09-27 ENCOUNTER — NON-APPOINTMENT (OUTPATIENT)
Age: 78
End: 2023-09-27

## 2023-09-28 ENCOUNTER — NON-APPOINTMENT (OUTPATIENT)
Age: 78
End: 2023-09-28

## 2023-10-09 ENCOUNTER — APPOINTMENT (OUTPATIENT)
Dept: HEART FAILURE | Facility: CLINIC | Age: 78
End: 2023-10-09
Payer: MEDICARE

## 2023-10-09 ENCOUNTER — NON-APPOINTMENT (OUTPATIENT)
Age: 78
End: 2023-10-09

## 2023-10-09 VITALS
HEIGHT: 71 IN | BODY MASS INDEX: 26.32 KG/M2 | WEIGHT: 188 LBS | DIASTOLIC BLOOD PRESSURE: 50 MMHG | SYSTOLIC BLOOD PRESSURE: 85 MMHG | OXYGEN SATURATION: 92 % | HEART RATE: 81 BPM

## 2023-10-09 VITALS — SYSTOLIC BLOOD PRESSURE: 93 MMHG | DIASTOLIC BLOOD PRESSURE: 59 MMHG

## 2023-10-09 PROCEDURE — 99215 OFFICE O/P EST HI 40 MIN: CPT

## 2023-10-09 PROCEDURE — 93000 ELECTROCARDIOGRAM COMPLETE: CPT

## 2023-10-10 ENCOUNTER — NON-APPOINTMENT (OUTPATIENT)
Age: 78
End: 2023-10-10

## 2023-10-10 ENCOUNTER — APPOINTMENT (OUTPATIENT)
Dept: CARDIOLOGY | Facility: CLINIC | Age: 78
End: 2023-10-10
Payer: MEDICARE

## 2023-10-10 VITALS
OXYGEN SATURATION: 96 % | TEMPERATURE: 94.5 F | DIASTOLIC BLOOD PRESSURE: 60 MMHG | WEIGHT: 192 LBS | HEIGHT: 71 IN | SYSTOLIC BLOOD PRESSURE: 101 MMHG | RESPIRATION RATE: 19 BRPM | HEART RATE: 80 BPM | BODY MASS INDEX: 26.88 KG/M2

## 2023-10-10 PROCEDURE — 99214 OFFICE O/P EST MOD 30 MIN: CPT

## 2023-10-10 PROCEDURE — 93000 ELECTROCARDIOGRAM COMPLETE: CPT

## 2023-10-12 ENCOUNTER — APPOINTMENT (OUTPATIENT)
Dept: RADIATION ONCOLOGY | Facility: CLINIC | Age: 78
End: 2023-10-12
Payer: MEDICARE

## 2023-10-12 PROCEDURE — 99024 POSTOP FOLLOW-UP VISIT: CPT

## 2023-10-16 ENCOUNTER — NON-APPOINTMENT (OUTPATIENT)
Age: 78
End: 2023-10-16

## 2023-10-16 ENCOUNTER — APPOINTMENT (OUTPATIENT)
Dept: HEART FAILURE | Facility: CLINIC | Age: 78
End: 2023-10-16
Payer: MEDICARE

## 2023-10-16 VITALS
HEART RATE: 65 BPM | TEMPERATURE: 97 F | HEIGHT: 71 IN | SYSTOLIC BLOOD PRESSURE: 99 MMHG | WEIGHT: 187 LBS | BODY MASS INDEX: 26.18 KG/M2 | OXYGEN SATURATION: 99 % | DIASTOLIC BLOOD PRESSURE: 65 MMHG

## 2023-10-16 DIAGNOSIS — L03.90 CELLULITIS, UNSPECIFIED: ICD-10-CM

## 2023-10-16 PROCEDURE — 99214 OFFICE O/P EST MOD 30 MIN: CPT

## 2023-10-16 PROCEDURE — 93000 ELECTROCARDIOGRAM COMPLETE: CPT

## 2023-10-16 PROCEDURE — 36415 COLL VENOUS BLD VENIPUNCTURE: CPT

## 2023-10-16 RX ORDER — CARVEDILOL 3.12 MG/1
3.12 TABLET, FILM COATED ORAL TWICE DAILY
Qty: 60 | Refills: 2 | Status: DISCONTINUED | COMMUNITY
Start: 2023-10-16 | End: 2023-10-16

## 2023-10-18 LAB
ALBUMIN SERPL ELPH-MCNC: 3.8 G/DL
ALP BLD-CCNC: 380 U/L
ALT SERPL-CCNC: 39 U/L
ANION GAP SERPL CALC-SCNC: 14 MMOL/L
AST SERPL-CCNC: 55 U/L
BILIRUB SERPL-MCNC: 1.3 MG/DL
BUN SERPL-MCNC: 57 MG/DL
CALCIUM SERPL-MCNC: 9.3 MG/DL
CHLORIDE SERPL-SCNC: 96 MMOL/L
CO2 SERPL-SCNC: 25 MMOL/L
CREAT SERPL-MCNC: 1.49 MG/DL
EGFR: 48 ML/MIN/1.73M2
ESTIMATED AVERAGE GLUCOSE: 140 MG/DL
HBA1C MFR BLD HPLC: 6.5 %
HCT VFR BLD CALC: 37.6 %
HGB BLD-MCNC: 12.1 G/DL
MCHC RBC-ENTMCNC: 31.3 PG
MCHC RBC-ENTMCNC: 32.2 GM/DL
MCV RBC AUTO: 97.2 FL
NT-PROBNP SERPL-MCNC: 3118 PG/ML
PLATELET # BLD AUTO: 160 K/UL
POTASSIUM SERPL-SCNC: 4.3 MMOL/L
PROT SERPL-MCNC: 7.2 G/DL
RBC # BLD: 3.87 M/UL
RBC # FLD: 17.4 %
SODIUM SERPL-SCNC: 135 MMOL/L
WBC # FLD AUTO: 5.24 K/UL

## 2023-10-20 ENCOUNTER — EMERGENCY (EMERGENCY)
Facility: HOSPITAL | Age: 78
LOS: 1 days | Discharge: HOME | End: 2023-10-20
Attending: EMERGENCY MEDICINE | Admitting: INTERNAL MEDICINE
Payer: MEDICARE

## 2023-10-20 VITALS
HEART RATE: 100 BPM | SYSTOLIC BLOOD PRESSURE: 81 MMHG | DIASTOLIC BLOOD PRESSURE: 53 MMHG | HEIGHT: 72 IN | WEIGHT: 177.91 LBS | RESPIRATION RATE: 20 BRPM | OXYGEN SATURATION: 97 % | TEMPERATURE: 98 F

## 2023-10-20 VITALS
OXYGEN SATURATION: 96 % | RESPIRATION RATE: 15 BRPM | HEART RATE: 92 BPM | DIASTOLIC BLOOD PRESSURE: 66 MMHG | SYSTOLIC BLOOD PRESSURE: 92 MMHG

## 2023-10-20 DIAGNOSIS — Z98.890 OTHER SPECIFIED POSTPROCEDURAL STATES: Chronic | ICD-10-CM

## 2023-10-20 PROCEDURE — 30901 CONTROL OF NOSEBLEED: CPT | Mod: LT

## 2023-10-20 PROCEDURE — 99282 EMERGENCY DEPT VISIT SF MDM: CPT

## 2023-10-20 PROCEDURE — 30903 CONTROL OF NOSEBLEED: CPT | Mod: LT

## 2023-10-20 PROCEDURE — 99283 EMERGENCY DEPT VISIT LOW MDM: CPT | Mod: 25

## 2023-10-20 RX ORDER — PHENYLEPHRINE HCL 0.25 %
1 AEROSOL, SPRAY WITH PUMP (ML) NASAL ONCE
Refills: 0 | Status: COMPLETED | OUTPATIENT
Start: 2023-10-20 | End: 2023-10-20

## 2023-10-20 RX ADMIN — Medication 1 SPRAY(S): at 19:45

## 2023-10-20 NOTE — ED ADULT NURSE NOTE - CHIEF COMPLAINT
Lab Results   Component Value Date    VITD25 28.9 (L) 12/28/2018     Continue to supplement @ 5000 IU QD The patient is a 78y Male complaining of epistaxis.

## 2023-10-20 NOTE — ED ADULT NURSE NOTE - OBJECTIVE STATEMENT
left nares nosebleed, spontaneous. left nostril, minimal blood noted at this time, in no apparent distress

## 2023-10-20 NOTE — ED PROVIDER NOTE - PATIENT PORTAL LINK FT
You can access the FollowMyHealth Patient Portal offered by [facility] by registering at the following website: [email or web address]. By joining [employer]’s FollowMyHealth portal, you will also be able to view your health information using other applications (apps) compatible with our system.

## 2023-10-20 NOTE — ED ADULT NURSE NOTE - NSFALLUNIVINTERV_ED_ALL_ED
Bed/Stretcher in lowest position, wheels locked, appropriate side rails in place/Call bell, personal items and telephone in reach/Instruct patient to call for assistance before getting out of bed/chair/stretcher/Non-slip footwear applied when patient is off stretcher/[place] to call system/Physically safe environment - no spills, clutter or unnecessary equipment/Purposeful proactive rounding/Room/bathroom lighting operational, light cord in reach

## 2023-10-20 NOTE — ED PROVIDER NOTE - OBJECTIVE STATEMENT
78-year-old male with history of hypertension on aspirin and Plavix presents to ED complaining of nosebleed from the left nostril started suddenly minimal bleeding now already has resolved no trauma

## 2023-10-20 NOTE — ED PROVIDER NOTE - CLINICAL SUMMARY MEDICAL DECISION MAKING FREE TEXT BOX
pt on plavix,  and asa presented c/o nose bleed sprayed with Emeka-Synephrine and packed with 5.5 Rhino rocket and advised to f.u with ent in 2 days

## 2023-10-23 ENCOUNTER — APPOINTMENT (OUTPATIENT)
Dept: HEART FAILURE | Facility: CLINIC | Age: 78
End: 2023-10-23
Payer: MEDICARE

## 2023-10-23 ENCOUNTER — NON-APPOINTMENT (OUTPATIENT)
Age: 78
End: 2023-10-23

## 2023-10-23 VITALS
BODY MASS INDEX: 26.32 KG/M2 | SYSTOLIC BLOOD PRESSURE: 90 MMHG | WEIGHT: 188 LBS | HEART RATE: 68 BPM | HEIGHT: 71 IN | DIASTOLIC BLOOD PRESSURE: 53 MMHG

## 2023-10-23 LAB
HCT VFR BLD CALC: 38.8 %
HGB BLD-MCNC: 12.6 G/DL
MCHC RBC-ENTMCNC: 31.7 PG
MCHC RBC-ENTMCNC: 32.5 GM/DL
MCV RBC AUTO: 97.7 FL
PLATELET # BLD AUTO: 150 K/UL
RBC # BLD: 3.97 M/UL
RBC # FLD: 17.7 %
WBC # FLD AUTO: 5.79 K/UL

## 2023-10-23 PROCEDURE — 93000 ELECTROCARDIOGRAM COMPLETE: CPT

## 2023-10-23 PROCEDURE — 99214 OFFICE O/P EST MOD 30 MIN: CPT

## 2023-10-23 PROCEDURE — 36415 COLL VENOUS BLD VENIPUNCTURE: CPT

## 2023-10-24 ENCOUNTER — APPOINTMENT (OUTPATIENT)
Dept: FAMILY MEDICINE | Facility: CLINIC | Age: 78
End: 2023-10-24
Payer: MEDICARE

## 2023-10-24 VITALS
SYSTOLIC BLOOD PRESSURE: 114 MMHG | DIASTOLIC BLOOD PRESSURE: 60 MMHG | HEART RATE: 68 BPM | BODY MASS INDEX: 25.76 KG/M2 | WEIGHT: 184 LBS | HEIGHT: 71 IN | RESPIRATION RATE: 20 BRPM

## 2023-10-24 DIAGNOSIS — Z00.00 ENCOUNTER FOR GENERAL ADULT MEDICAL EXAMINATION W/OUT ABNORMAL FINDINGS: ICD-10-CM

## 2023-10-24 DIAGNOSIS — Z23 ENCOUNTER FOR IMMUNIZATION: ICD-10-CM

## 2023-10-24 PROCEDURE — 99205 OFFICE O/P NEW HI 60 MIN: CPT | Mod: 25

## 2023-10-24 PROCEDURE — 36415 COLL VENOUS BLD VENIPUNCTURE: CPT

## 2023-10-24 PROCEDURE — 90662 IIV NO PRSV INCREASED AG IM: CPT

## 2023-10-24 PROCEDURE — G0008: CPT

## 2023-10-25 LAB
ALBUMIN SERPL ELPH-MCNC: 3.7 G/DL
ALBUMIN SERPL ELPH-MCNC: 4.1 G/DL
ALP BLD-CCNC: 358 U/L
ALP BLD-CCNC: 389 U/L
ALT SERPL-CCNC: 35 U/L
ALT SERPL-CCNC: 40 U/L
ANION GAP SERPL CALC-SCNC: 13 MMOL/L
ANION GAP SERPL CALC-SCNC: 16 MMOL/L
AST SERPL-CCNC: 44 U/L
AST SERPL-CCNC: 49 U/L
BILIRUB SERPL-MCNC: 1.4 MG/DL
BILIRUB SERPL-MCNC: 1.6 MG/DL
BUN SERPL-MCNC: 67 MG/DL
BUN SERPL-MCNC: 67 MG/DL
CALCIUM SERPL-MCNC: 9.3 MG/DL
CALCIUM SERPL-MCNC: 9.8 MG/DL
CHLORIDE SERPL-SCNC: 96 MMOL/L
CHLORIDE SERPL-SCNC: 99 MMOL/L
CHOLEST SERPL-MCNC: 116 MG/DL
CO2 SERPL-SCNC: 24 MMOL/L
CO2 SERPL-SCNC: 25 MMOL/L
CREAT SERPL-MCNC: 1.67 MG/DL
CREAT SERPL-MCNC: 1.71 MG/DL
EGFR: 40 ML/MIN/1.73M2
EGFR: 42 ML/MIN/1.73M2
ESTIMATED AVERAGE GLUCOSE: 151 MG/DL
FOLATE SERPL-MCNC: 12.1 NG/ML
GLUCOSE SERPL-MCNC: 98 MG/DL
HBA1C MFR BLD HPLC: 6.9 %
HCT VFR BLD CALC: 35.6 %
HDLC SERPL-MCNC: 59 MG/DL
HGB BLD-MCNC: 11.5 G/DL
LDLC SERPL CALC-MCNC: 38 MG/DL
MAGNESIUM SERPL-MCNC: 2.8 MG/DL
MCHC RBC-ENTMCNC: 31.6 PG
MCHC RBC-ENTMCNC: 32.3 GM/DL
MCV RBC AUTO: 97.8 FL
NONHDLC SERPL-MCNC: 57 MG/DL
NT-PROBNP SERPL-MCNC: 6548 PG/ML
PLATELET # BLD AUTO: 144 K/UL
POTASSIUM SERPL-SCNC: 4.1 MMOL/L
POTASSIUM SERPL-SCNC: 4.6 MMOL/L
PROT SERPL-MCNC: 6.9 G/DL
PROT SERPL-MCNC: 7.7 G/DL
PSA SERPL-MCNC: 0.5 NG/ML
RBC # BLD: 3.64 M/UL
RBC # FLD: 17.7 %
SODIUM SERPL-SCNC: 136 MMOL/L
SODIUM SERPL-SCNC: 138 MMOL/L
T4 FREE SERPL-MCNC: 1.1 NG/DL
TESTOST SERPL-MCNC: 44.8 NG/DL
TRIGL SERPL-MCNC: 103 MG/DL
TSH SERPL-ACNC: 4.11 UIU/ML
VIT B12 SERPL-MCNC: 863 PG/ML
WBC # FLD AUTO: 6.12 K/UL

## 2023-10-27 NOTE — CHART NOTE - NSCHARTNOTEFT_GEN_A_CORE
The patient is a 78y Male presenting to [facility] ED on 10/20 complaining of epistaxis.  As Per HICALEB patient has scheduled follow up appointment on 10/24/23 @ 2:15pm with Family Practice.

## 2023-11-02 ENCOUNTER — APPOINTMENT (OUTPATIENT)
Dept: HEART FAILURE | Facility: CLINIC | Age: 78
End: 2023-11-02
Payer: MEDICARE

## 2023-11-02 ENCOUNTER — NON-APPOINTMENT (OUTPATIENT)
Age: 78
End: 2023-11-02

## 2023-11-02 VITALS
TEMPERATURE: 97 F | WEIGHT: 189 LBS | HEIGHT: 71 IN | HEART RATE: 60 BPM | DIASTOLIC BLOOD PRESSURE: 64 MMHG | BODY MASS INDEX: 26.46 KG/M2 | SYSTOLIC BLOOD PRESSURE: 93 MMHG

## 2023-11-02 PROCEDURE — 36415 COLL VENOUS BLD VENIPUNCTURE: CPT

## 2023-11-02 PROCEDURE — 99214 OFFICE O/P EST MOD 30 MIN: CPT

## 2023-11-02 PROCEDURE — 93000 ELECTROCARDIOGRAM COMPLETE: CPT

## 2023-11-03 LAB
ALBUMIN SERPL ELPH-MCNC: 4.1 G/DL
ALP BLD-CCNC: 379 U/L
ALT SERPL-CCNC: 37 U/L
ANION GAP SERPL CALC-SCNC: 15 MMOL/L
AST SERPL-CCNC: 48 U/L
BILIRUB SERPL-MCNC: 1.5 MG/DL
BUN SERPL-MCNC: 76 MG/DL
CALCIUM SERPL-MCNC: 9.4 MG/DL
CHLORIDE SERPL-SCNC: 97 MMOL/L
CO2 SERPL-SCNC: 24 MMOL/L
CREAT SERPL-MCNC: 1.8 MG/DL
EGFR: 38 ML/MIN/1.73M2
ESTIMATED AVERAGE GLUCOSE: 146 MG/DL
HBA1C MFR BLD HPLC: 6.7 %
HCT VFR BLD CALC: 37.4 %
HGB BLD-MCNC: 12.2 G/DL
MCHC RBC-ENTMCNC: 31.6 PG
MCHC RBC-ENTMCNC: 32.6 GM/DL
MCV RBC AUTO: 96.9 FL
NT-PROBNP SERPL-MCNC: 6188 PG/ML
PLATELET # BLD AUTO: 124 K/UL
POTASSIUM SERPL-SCNC: 4.6 MMOL/L
PROT SERPL-MCNC: 7.4 G/DL
RBC # BLD: 3.86 M/UL
RBC # FLD: 18.1 %
SODIUM SERPL-SCNC: 136 MMOL/L
URATE SERPL-MCNC: 11.8 MG/DL
WBC # FLD AUTO: 8.35 K/UL

## 2023-11-13 ENCOUNTER — APPOINTMENT (OUTPATIENT)
Dept: CV DIAGNOSITCS | Facility: HOSPITAL | Age: 78
End: 2023-11-13

## 2023-11-16 ENCOUNTER — APPOINTMENT (OUTPATIENT)
Dept: HEART FAILURE | Facility: CLINIC | Age: 78
End: 2023-11-16
Payer: MEDICARE

## 2023-11-16 ENCOUNTER — NON-APPOINTMENT (OUTPATIENT)
Age: 78
End: 2023-11-16

## 2023-11-16 VITALS
HEART RATE: 63 BPM | BODY MASS INDEX: 26.88 KG/M2 | DIASTOLIC BLOOD PRESSURE: 62 MMHG | SYSTOLIC BLOOD PRESSURE: 97 MMHG | WEIGHT: 192 LBS | HEIGHT: 71 IN | OXYGEN SATURATION: 99 %

## 2023-11-16 DIAGNOSIS — I50.23 ACUTE ON CHRONIC SYSTOLIC (CONGESTIVE) HEART FAILURE: ICD-10-CM

## 2023-11-16 PROCEDURE — 36415 COLL VENOUS BLD VENIPUNCTURE: CPT

## 2023-11-16 PROCEDURE — 99214 OFFICE O/P EST MOD 30 MIN: CPT

## 2023-11-16 PROCEDURE — 93000 ELECTROCARDIOGRAM COMPLETE: CPT

## 2023-11-16 RX ORDER — POLYETHYLENE GLYCOL 3350 17 G/17G
17 POWDER, FOR SOLUTION ORAL
Qty: 1 | Refills: 0 | Status: DISCONTINUED | OUTPATIENT
Start: 2021-06-04 | End: 2023-11-16

## 2023-11-16 RX ORDER — CIPROFLOXACIN HYDROCHLORIDE 500 MG/1
500 TABLET, FILM COATED ORAL
Qty: 14 | Refills: 0 | Status: DISCONTINUED | COMMUNITY
Start: 2023-10-16 | End: 2023-11-16

## 2023-11-16 RX ORDER — ZOLPIDEM TARTRATE 12.5 MG/1
12.5 TABLET, EXTENDED RELEASE ORAL
Qty: 15 | Refills: 0 | Status: DISCONTINUED | COMMUNITY
Start: 2023-01-03 | End: 2023-11-16

## 2023-11-16 RX ORDER — ISOSORBIDE MONONITRATE 10 MG/1
10 TABLET ORAL
Qty: 90 | Refills: 2 | Status: DISCONTINUED | COMMUNITY
Start: 2023-07-07 | End: 2023-11-16

## 2023-11-16 RX ORDER — VENLAFAXINE HYDROCHLORIDE 150 MG/1
150 CAPSULE, EXTENDED RELEASE ORAL DAILY
Refills: 0 | Status: DISCONTINUED | COMMUNITY
Start: 2022-06-06 | End: 2023-11-16

## 2023-11-16 RX ORDER — POLYETHYLENE GLYCOL 3350 17 G/17G
17 POWDER, FOR SOLUTION ORAL
Qty: 30 | Refills: 0 | Status: DISCONTINUED | COMMUNITY
Start: 2022-10-13 | End: 2023-11-16

## 2023-11-16 RX ORDER — LORAZEPAM 0.5 MG/1
0.5 TABLET ORAL
Refills: 0 | Status: DISCONTINUED | COMMUNITY
Start: 2023-08-10 | End: 2023-11-16

## 2023-11-17 LAB
ALBUMIN SERPL ELPH-MCNC: 3.7 G/DL
ALP BLD-CCNC: 357 U/L
ALT SERPL-CCNC: 45 U/L
ANION GAP SERPL CALC-SCNC: 15 MMOL/L
AST SERPL-CCNC: 46 U/L
BILIRUB SERPL-MCNC: 1.5 MG/DL
BUN SERPL-MCNC: 69 MG/DL
CALCIUM SERPL-MCNC: 9.2 MG/DL
CHLORIDE SERPL-SCNC: 96 MMOL/L
CO2 SERPL-SCNC: 24 MMOL/L
CREAT SERPL-MCNC: 1.69 MG/DL
EGFR: 41 ML/MIN/1.73M2
ESTIMATED AVERAGE GLUCOSE: 143 MG/DL
HBA1C MFR BLD HPLC: 6.6 %
HCT VFR BLD CALC: 32.4 %
HGB BLD-MCNC: 10.6 G/DL
MCHC RBC-ENTMCNC: 31.7 PG
MCHC RBC-ENTMCNC: 32.7 GM/DL
MCV RBC AUTO: 97 FL
NT-PROBNP SERPL-MCNC: 3798 PG/ML
PLATELET # BLD AUTO: 90 K/UL
POTASSIUM SERPL-SCNC: 4.3 MMOL/L
PROT SERPL-MCNC: 6.8 G/DL
RBC # BLD: 3.34 M/UL
RBC # FLD: 18.6 %
SODIUM SERPL-SCNC: 135 MMOL/L
WBC # FLD AUTO: 5.67 K/UL

## 2023-11-22 NOTE — ED ADULT TRIAGE NOTE - NSTRIAGECARE_GEN_A_ER
[facility] Jermaine Chiang  Gastroenterology  [address] [place] [id number]  Phone: [phone]  Fax: [phone]  Follow Up Time: 4-6 Days

## 2023-11-23 ENCOUNTER — TRANSCRIPTION ENCOUNTER (OUTPATIENT)
Age: 78
End: 2023-11-23

## 2023-11-25 LAB
HCT VFR BLD CALC: 34.2 %
HGB BLD-MCNC: 11.6 G/DL
MCHC RBC-ENTMCNC: 32.1 PG
MCHC RBC-ENTMCNC: 33.9 GM/DL
MCV RBC AUTO: 94.7 FL
PLATELET # BLD AUTO: 123 K/UL
RBC # BLD: 3.61 M/UL
RBC # FLD: 17.3 %
WBC # FLD AUTO: 4.49 K/UL

## 2023-11-27 ENCOUNTER — NON-APPOINTMENT (OUTPATIENT)
Age: 78
End: 2023-11-27

## 2023-11-27 LAB
ALBUMIN SERPL ELPH-MCNC: 4 G/DL
ALP BLD-CCNC: 340 U/L
ALT SERPL-CCNC: 40 U/L
ANION GAP SERPL CALC-SCNC: 15 MMOL/L
AST SERPL-CCNC: 51 U/L
BILIRUB SERPL-MCNC: 1.8 MG/DL
BUN SERPL-MCNC: 104 MG/DL
CALCIUM SERPL-MCNC: 9.4 MG/DL
CHLORIDE SERPL-SCNC: 85 MMOL/L
CO2 SERPL-SCNC: 27 MMOL/L
CREAT SERPL-MCNC: 1.98 MG/DL
EGFR: 34 ML/MIN/1.73M2
GLUCOSE SERPL-MCNC: 88 MG/DL
NT-PROBNP SERPL-MCNC: 3975 PG/ML
POTASSIUM SERPL-SCNC: 4.4 MMOL/L
PROT SERPL-MCNC: 7 G/DL
SODIUM SERPL-SCNC: 126 MMOL/L

## 2023-11-30 ENCOUNTER — APPOINTMENT (OUTPATIENT)
Dept: FAMILY MEDICINE | Facility: CLINIC | Age: 78
End: 2023-11-30
Payer: MEDICARE

## 2023-11-30 VITALS
RESPIRATION RATE: 20 BRPM | HEART RATE: 68 BPM | SYSTOLIC BLOOD PRESSURE: 112 MMHG | HEIGHT: 71 IN | DIASTOLIC BLOOD PRESSURE: 60 MMHG | WEIGHT: 187 LBS | BODY MASS INDEX: 26.18 KG/M2

## 2023-11-30 VITALS — SYSTOLIC BLOOD PRESSURE: 114 MMHG | DIASTOLIC BLOOD PRESSURE: 65 MMHG

## 2023-11-30 PROCEDURE — 99214 OFFICE O/P EST MOD 30 MIN: CPT

## 2023-12-07 ENCOUNTER — APPOINTMENT (OUTPATIENT)
Dept: HEART FAILURE | Facility: CLINIC | Age: 78
End: 2023-12-07
Payer: MEDICARE

## 2023-12-07 VITALS
DIASTOLIC BLOOD PRESSURE: 57 MMHG | BODY MASS INDEX: 26.08 KG/M2 | HEART RATE: 65 BPM | SYSTOLIC BLOOD PRESSURE: 92 MMHG | WEIGHT: 187 LBS

## 2023-12-07 DIAGNOSIS — R60.9 EDEMA, UNSPECIFIED: ICD-10-CM

## 2023-12-07 PROCEDURE — 36415 COLL VENOUS BLD VENIPUNCTURE: CPT

## 2023-12-07 PROCEDURE — 99214 OFFICE O/P EST MOD 30 MIN: CPT

## 2023-12-08 PROBLEM — R60.9 EDEMA: Status: ACTIVE | Noted: 2023-01-24

## 2023-12-08 LAB
ALBUMIN SERPL ELPH-MCNC: 3.7 G/DL
ALP BLD-CCNC: 330 U/L
ALT SERPL-CCNC: 41 U/L
ANION GAP SERPL CALC-SCNC: 13 MMOL/L
AST SERPL-CCNC: 42 U/L
BILIRUB SERPL-MCNC: 1.5 MG/DL
BUN SERPL-MCNC: 75 MG/DL
CALCIUM SERPL-MCNC: 9.3 MG/DL
CHLORIDE SERPL-SCNC: 96 MMOL/L
CO2 SERPL-SCNC: 25 MMOL/L
CREAT SERPL-MCNC: 2 MG/DL
EGFR: 34 ML/MIN/1.73M2
HCT VFR BLD CALC: 33.4 %
HGB BLD-MCNC: 11.2 G/DL
MCHC RBC-ENTMCNC: 33.5 GM/DL
MCHC RBC-ENTMCNC: 33.5 PG
MCV RBC AUTO: 100 FL
NT-PROBNP SERPL-MCNC: 6296 PG/ML
PLATELET # BLD AUTO: 119 K/UL
POTASSIUM SERPL-SCNC: 4.1 MMOL/L
PROT SERPL-MCNC: 6.8 G/DL
RBC # BLD: 3.34 M/UL
RBC # FLD: 18.2 %
SODIUM SERPL-SCNC: 133 MMOL/L
WBC # FLD AUTO: 6.08 K/UL

## 2023-12-12 ENCOUNTER — NON-APPOINTMENT (OUTPATIENT)
Age: 78
End: 2023-12-12

## 2023-12-12 ENCOUNTER — OUTPATIENT (OUTPATIENT)
Dept: OUTPATIENT SERVICES | Facility: HOSPITAL | Age: 78
LOS: 1 days | Discharge: HOME | End: 2023-12-12

## 2023-12-12 DIAGNOSIS — Z98.890 OTHER SPECIFIED POSTPROCEDURAL STATES: Chronic | ICD-10-CM

## 2023-12-12 DIAGNOSIS — C34.00 MALIGNANT NEOPLASM OF UNSPECIFIED MAIN BRONCHUS: ICD-10-CM

## 2023-12-16 ENCOUNTER — APPOINTMENT (OUTPATIENT)
Dept: AFTER HOURS CARE | Facility: EMERGENCY ROOM | Age: 78
End: 2023-12-16
Payer: MEDICARE

## 2023-12-16 ENCOUNTER — TRANSCRIPTION ENCOUNTER (OUTPATIENT)
Age: 78
End: 2023-12-16

## 2023-12-16 PROCEDURE — 99214 OFFICE O/P EST MOD 30 MIN: CPT | Mod: 95

## 2023-12-16 PROCEDURE — 99204 OFFICE O/P NEW MOD 45 MIN: CPT | Mod: 95

## 2023-12-17 NOTE — HISTORY OF PRESENT ILLNESS
[Home] : at home, [unfilled] , at the time of the visit. [Other Location: e.g. Home (Enter Location, City,State)___] : at [unfilled] [Verbal consent obtained from patient] : the patient, [unfilled] [Spouse] : spouse [FreeTextEntry8] : 78 yom with history of lung CA RUL removed 9/2022, 1/23 another spot on left lung - radiation x 5 here with sob.  Pt states that has a cough x 5 days.  Abx - amoxicillin x 3 days.  No pulse oximeter at home BP 91/62, pulse   Covid negative   Last night nausea  COVID vaccine x 4, influenza

## 2023-12-17 NOTE — PLAN
[FreeTextEntry1] : 78 yom with lung ca here with possible URI vs pna.  Trying to avoid going to the ED.  Given his comorbidities, BP 90/60 and already on abx, will recommend going to the ER.  Currently 87/58 I strongly recommended going to the ER.

## 2023-12-17 NOTE — PHYSICAL EXAM
[No Acute Distress] : no acute distress [Well Nourished] : well nourished [No Rash] : no rash [Speech Grossly Normal] : speech grossly normal [Memory Grossly Normal] : memory grossly normal [Normal Affect] : the affect was normal [de-identified] : audible wheezing

## 2023-12-18 ENCOUNTER — INPATIENT (INPATIENT)
Facility: HOSPITAL | Age: 78
LOS: 2 days | Discharge: HOME | DRG: 202 | End: 2023-12-21
Attending: HOSPITALIST | Admitting: INTERNAL MEDICINE
Payer: MEDICARE

## 2023-12-18 VITALS
TEMPERATURE: 98 F | OXYGEN SATURATION: 100 % | RESPIRATION RATE: 20 BRPM | DIASTOLIC BLOOD PRESSURE: 59 MMHG | HEIGHT: 72 IN | WEIGHT: 181 LBS | SYSTOLIC BLOOD PRESSURE: 87 MMHG | HEART RATE: 58 BPM

## 2023-12-18 DIAGNOSIS — I50.9 HEART FAILURE, UNSPECIFIED: ICD-10-CM

## 2023-12-18 DIAGNOSIS — Z98.890 OTHER SPECIFIED POSTPROCEDURAL STATES: Chronic | ICD-10-CM

## 2023-12-18 LAB
ALBUMIN SERPL ELPH-MCNC: 3.2 G/DL — LOW (ref 3.3–5)
ALBUMIN SERPL ELPH-MCNC: 3.2 G/DL — LOW (ref 3.3–5)
ALP SERPL-CCNC: 343 U/L — HIGH (ref 40–120)
ALP SERPL-CCNC: 343 U/L — HIGH (ref 40–120)
ALT FLD-CCNC: 45 U/L — SIGNIFICANT CHANGE UP (ref 10–45)
ALT FLD-CCNC: 45 U/L — SIGNIFICANT CHANGE UP (ref 10–45)
ANION GAP SERPL CALC-SCNC: 12 MMOL/L — SIGNIFICANT CHANGE UP (ref 5–17)
ANION GAP SERPL CALC-SCNC: 12 MMOL/L — SIGNIFICANT CHANGE UP (ref 5–17)
AST SERPL-CCNC: 59 U/L — HIGH (ref 10–40)
AST SERPL-CCNC: 59 U/L — HIGH (ref 10–40)
BASOPHILS # BLD AUTO: 0.03 K/UL — SIGNIFICANT CHANGE UP (ref 0–0.2)
BASOPHILS # BLD AUTO: 0.03 K/UL — SIGNIFICANT CHANGE UP (ref 0–0.2)
BASOPHILS NFR BLD AUTO: 0.5 % — SIGNIFICANT CHANGE UP (ref 0–2)
BASOPHILS NFR BLD AUTO: 0.5 % — SIGNIFICANT CHANGE UP (ref 0–2)
BILIRUB SERPL-MCNC: 1.7 MG/DL — HIGH (ref 0.2–1.2)
BILIRUB SERPL-MCNC: 1.7 MG/DL — HIGH (ref 0.2–1.2)
BUN SERPL-MCNC: 103 MG/DL — HIGH (ref 7–23)
BUN SERPL-MCNC: 103 MG/DL — HIGH (ref 7–23)
CALCIUM SERPL-MCNC: 9.2 MG/DL — SIGNIFICANT CHANGE UP (ref 8.4–10.5)
CALCIUM SERPL-MCNC: 9.2 MG/DL — SIGNIFICANT CHANGE UP (ref 8.4–10.5)
CHLORIDE SERPL-SCNC: 97 MMOL/L — SIGNIFICANT CHANGE UP (ref 96–108)
CHLORIDE SERPL-SCNC: 97 MMOL/L — SIGNIFICANT CHANGE UP (ref 96–108)
CO2 SERPL-SCNC: 23 MMOL/L — SIGNIFICANT CHANGE UP (ref 22–31)
CO2 SERPL-SCNC: 23 MMOL/L — SIGNIFICANT CHANGE UP (ref 22–31)
CREAT SERPL-MCNC: 2.29 MG/DL — HIGH (ref 0.5–1.3)
CREAT SERPL-MCNC: 2.29 MG/DL — HIGH (ref 0.5–1.3)
EGFR: 29 ML/MIN/1.73M2 — LOW
EGFR: 29 ML/MIN/1.73M2 — LOW
EOSINOPHIL # BLD AUTO: 0.1 K/UL — SIGNIFICANT CHANGE UP (ref 0–0.5)
EOSINOPHIL # BLD AUTO: 0.1 K/UL — SIGNIFICANT CHANGE UP (ref 0–0.5)
EOSINOPHIL NFR BLD AUTO: 1.8 % — SIGNIFICANT CHANGE UP (ref 0–6)
EOSINOPHIL NFR BLD AUTO: 1.8 % — SIGNIFICANT CHANGE UP (ref 0–6)
GLUCOSE SERPL-MCNC: 113 MG/DL — HIGH (ref 70–99)
GLUCOSE SERPL-MCNC: 113 MG/DL — HIGH (ref 70–99)
HCT VFR BLD CALC: 34.1 % — LOW (ref 39–50)
HCT VFR BLD CALC: 34.1 % — LOW (ref 39–50)
HGB BLD-MCNC: 11.7 G/DL — LOW (ref 13–17)
HGB BLD-MCNC: 11.7 G/DL — LOW (ref 13–17)
IMM GRANULOCYTES NFR BLD AUTO: 0.5 % — SIGNIFICANT CHANGE UP (ref 0–0.9)
IMM GRANULOCYTES NFR BLD AUTO: 0.5 % — SIGNIFICANT CHANGE UP (ref 0–0.9)
LYMPHOCYTES # BLD AUTO: 0.71 K/UL — LOW (ref 1–3.3)
LYMPHOCYTES # BLD AUTO: 0.71 K/UL — LOW (ref 1–3.3)
LYMPHOCYTES # BLD AUTO: 12.8 % — LOW (ref 13–44)
LYMPHOCYTES # BLD AUTO: 12.8 % — LOW (ref 13–44)
MCHC RBC-ENTMCNC: 32.5 PG — SIGNIFICANT CHANGE UP (ref 27–34)
MCHC RBC-ENTMCNC: 32.5 PG — SIGNIFICANT CHANGE UP (ref 27–34)
MCHC RBC-ENTMCNC: 34.3 GM/DL — SIGNIFICANT CHANGE UP (ref 32–36)
MCHC RBC-ENTMCNC: 34.3 GM/DL — SIGNIFICANT CHANGE UP (ref 32–36)
MCV RBC AUTO: 94.7 FL — SIGNIFICANT CHANGE UP (ref 80–100)
MCV RBC AUTO: 94.7 FL — SIGNIFICANT CHANGE UP (ref 80–100)
MONOCYTES # BLD AUTO: 0.78 K/UL — SIGNIFICANT CHANGE UP (ref 0–0.9)
MONOCYTES # BLD AUTO: 0.78 K/UL — SIGNIFICANT CHANGE UP (ref 0–0.9)
MONOCYTES NFR BLD AUTO: 14 % — SIGNIFICANT CHANGE UP (ref 2–14)
MONOCYTES NFR BLD AUTO: 14 % — SIGNIFICANT CHANGE UP (ref 2–14)
NEUTROPHILS # BLD AUTO: 3.91 K/UL — SIGNIFICANT CHANGE UP (ref 1.8–7.4)
NEUTROPHILS # BLD AUTO: 3.91 K/UL — SIGNIFICANT CHANGE UP (ref 1.8–7.4)
NEUTROPHILS NFR BLD AUTO: 70.4 % — SIGNIFICANT CHANGE UP (ref 43–77)
NEUTROPHILS NFR BLD AUTO: 70.4 % — SIGNIFICANT CHANGE UP (ref 43–77)
NRBC # BLD: 0 /100 WBCS — SIGNIFICANT CHANGE UP (ref 0–0)
NRBC # BLD: 0 /100 WBCS — SIGNIFICANT CHANGE UP (ref 0–0)
NT-PROBNP SERPL-SCNC: 8770 PG/ML — HIGH (ref 0–300)
NT-PROBNP SERPL-SCNC: 8770 PG/ML — HIGH (ref 0–300)
PLATELET # BLD AUTO: 95 K/UL — LOW (ref 150–400)
PLATELET # BLD AUTO: 95 K/UL — LOW (ref 150–400)
POTASSIUM SERPL-MCNC: 4.3 MMOL/L — SIGNIFICANT CHANGE UP (ref 3.5–5.3)
POTASSIUM SERPL-MCNC: 4.3 MMOL/L — SIGNIFICANT CHANGE UP (ref 3.5–5.3)
POTASSIUM SERPL-SCNC: 4.3 MMOL/L — SIGNIFICANT CHANGE UP (ref 3.5–5.3)
POTASSIUM SERPL-SCNC: 4.3 MMOL/L — SIGNIFICANT CHANGE UP (ref 3.5–5.3)
PROT SERPL-MCNC: 7.9 G/DL — SIGNIFICANT CHANGE UP (ref 6–8.3)
PROT SERPL-MCNC: 7.9 G/DL — SIGNIFICANT CHANGE UP (ref 6–8.3)
RAPID RVP RESULT: DETECTED
RAPID RVP RESULT: DETECTED
RBC # BLD: 3.6 M/UL — LOW (ref 4.2–5.8)
RBC # BLD: 3.6 M/UL — LOW (ref 4.2–5.8)
RBC # FLD: 17.5 % — HIGH (ref 10.3–14.5)
RBC # FLD: 17.5 % — HIGH (ref 10.3–14.5)
RSV RNA SPEC QL NAA+PROBE: DETECTED
RSV RNA SPEC QL NAA+PROBE: DETECTED
SARS-COV-2 RNA SPEC QL NAA+PROBE: SIGNIFICANT CHANGE UP
SARS-COV-2 RNA SPEC QL NAA+PROBE: SIGNIFICANT CHANGE UP
SODIUM SERPL-SCNC: 132 MMOL/L — LOW (ref 135–145)
SODIUM SERPL-SCNC: 132 MMOL/L — LOW (ref 135–145)
TROPONIN I, HIGH SENSITIVITY RESULT: 79.7 NG/L — HIGH
TROPONIN I, HIGH SENSITIVITY RESULT: 79.7 NG/L — HIGH
TROPONIN I, HIGH SENSITIVITY RESULT: 95.9 NG/L — HIGH
TROPONIN I, HIGH SENSITIVITY RESULT: 95.9 NG/L — HIGH
WBC # BLD: 5.56 K/UL — SIGNIFICANT CHANGE UP (ref 3.8–10.5)
WBC # BLD: 5.56 K/UL — SIGNIFICANT CHANGE UP (ref 3.8–10.5)
WBC # FLD AUTO: 5.56 K/UL — SIGNIFICANT CHANGE UP (ref 3.8–10.5)
WBC # FLD AUTO: 5.56 K/UL — SIGNIFICANT CHANGE UP (ref 3.8–10.5)

## 2023-12-18 PROCEDURE — 99222 1ST HOSP IP/OBS MODERATE 55: CPT | Mod: GC

## 2023-12-18 PROCEDURE — 93010 ELECTROCARDIOGRAM REPORT: CPT

## 2023-12-18 PROCEDURE — 99285 EMERGENCY DEPT VISIT HI MDM: CPT

## 2023-12-18 PROCEDURE — 71250 CT THORAX DX C-: CPT | Mod: 26,MA

## 2023-12-18 PROCEDURE — 71045 X-RAY EXAM CHEST 1 VIEW: CPT | Mod: 26

## 2023-12-18 RX ORDER — SPIRONOLACTONE 25 MG/1
25 TABLET, FILM COATED ORAL DAILY
Refills: 0 | Status: DISCONTINUED | OUTPATIENT
Start: 2023-12-18 | End: 2023-12-18

## 2023-12-18 RX ORDER — SPIRONOLACTONE 25 MG/1
25 TABLET, FILM COATED ORAL DAILY
Refills: 0 | Status: DISCONTINUED | OUTPATIENT
Start: 2023-12-18 | End: 2023-12-19

## 2023-12-18 RX ORDER — AZITHROMYCIN 500 MG/1
500 TABLET, FILM COATED ORAL EVERY 24 HOURS
Refills: 0 | Status: DISCONTINUED | OUTPATIENT
Start: 2023-12-19 | End: 2023-12-19

## 2023-12-18 RX ORDER — METOPROLOL TARTRATE 50 MG
25 TABLET ORAL DAILY
Refills: 0 | Status: DISCONTINUED | OUTPATIENT
Start: 2023-12-18 | End: 2023-12-18

## 2023-12-18 RX ORDER — TAMSULOSIN HYDROCHLORIDE 0.4 MG/1
0.4 CAPSULE ORAL AT BEDTIME
Refills: 0 | Status: DISCONTINUED | OUTPATIENT
Start: 2023-12-18 | End: 2023-12-21

## 2023-12-18 RX ORDER — CLOPIDOGREL BISULFATE 75 MG/1
75 TABLET, FILM COATED ORAL DAILY
Refills: 0 | Status: DISCONTINUED | OUTPATIENT
Start: 2023-12-18 | End: 2023-12-21

## 2023-12-18 RX ORDER — IPRATROPIUM/ALBUTEROL SULFATE 18-103MCG
3 AEROSOL WITH ADAPTER (GRAM) INHALATION EVERY 6 HOURS
Refills: 0 | Status: DISCONTINUED | OUTPATIENT
Start: 2023-12-18 | End: 2023-12-21

## 2023-12-18 RX ORDER — FLUOXETINE HCL 10 MG
40 CAPSULE ORAL
Refills: 0 | Status: DISCONTINUED | OUTPATIENT
Start: 2023-12-20 | End: 2023-12-20

## 2023-12-18 RX ORDER — LANOLIN ALCOHOL/MO/W.PET/CERES
3 CREAM (GRAM) TOPICAL AT BEDTIME
Refills: 0 | Status: DISCONTINUED | OUTPATIENT
Start: 2023-12-18 | End: 2023-12-21

## 2023-12-18 RX ORDER — METOPROLOL TARTRATE 50 MG
25 TABLET ORAL DAILY
Refills: 0 | Status: DISCONTINUED | OUTPATIENT
Start: 2023-12-18 | End: 2023-12-21

## 2023-12-18 RX ORDER — LATANOPROST 0.05 MG/ML
1 SOLUTION/ DROPS OPHTHALMIC; TOPICAL
Qty: 0 | Refills: 0 | DISCHARGE

## 2023-12-18 RX ORDER — MIRTAZAPINE 45 MG/1
7.5 TABLET, ORALLY DISINTEGRATING ORAL AT BEDTIME
Refills: 0 | Status: DISCONTINUED | OUTPATIENT
Start: 2023-12-18 | End: 2023-12-21

## 2023-12-18 RX ORDER — AZITHROMYCIN 500 MG/1
TABLET, FILM COATED ORAL
Refills: 0 | Status: DISCONTINUED | OUTPATIENT
Start: 2023-12-18 | End: 2023-12-19

## 2023-12-18 RX ORDER — AZITHROMYCIN 500 MG/1
500 TABLET, FILM COATED ORAL ONCE
Refills: 0 | Status: COMPLETED | OUTPATIENT
Start: 2023-12-18 | End: 2023-12-18

## 2023-12-18 RX ORDER — ASPIRIN/CALCIUM CARB/MAGNESIUM 324 MG
81 TABLET ORAL DAILY
Refills: 0 | Status: DISCONTINUED | OUTPATIENT
Start: 2023-12-18 | End: 2023-12-21

## 2023-12-18 RX ORDER — ONDANSETRON 8 MG/1
4 TABLET, FILM COATED ORAL EVERY 8 HOURS
Refills: 0 | Status: DISCONTINUED | OUTPATIENT
Start: 2023-12-18 | End: 2023-12-21

## 2023-12-18 RX ORDER — ACETAMINOPHEN 500 MG
650 TABLET ORAL EVERY 6 HOURS
Refills: 0 | Status: DISCONTINUED | OUTPATIENT
Start: 2023-12-18 | End: 2023-12-21

## 2023-12-18 RX ORDER — FLUOXETINE HCL 10 MG
20 CAPSULE ORAL
Refills: 0 | Status: DISCONTINUED | OUTPATIENT
Start: 2023-12-19 | End: 2023-12-20

## 2023-12-18 RX ORDER — TEMAZEPAM 15 MG/1
15 CAPSULE ORAL AT BEDTIME
Refills: 0 | Status: DISCONTINUED | OUTPATIENT
Start: 2023-12-18 | End: 2023-12-21

## 2023-12-18 NOTE — ED PROVIDER NOTE - CLINICAL SUMMARY MEDICAL DECISION MAKING FREE TEXT BOX
78-year-old male past medical history of CAD lung cancer status post right lower lobe lobectomy in 2022, CHF, hypertension hyperlipidemia, presents with flulike symptoms, shortness of breath, cough, since Saturday.  Patient endorses he is still able to complete his ADLs, including getting dressed going from room to room without feeling too fatigued or short of breath.  Patient denies fevers chills, GI symptoms, urinary symptoms, chest pain.    On exam, patient noted to have wet sounding cough, no rhonchi, no Rales, no wheezing, nonlabored breathing while at rest, not tachypneic, not hypoxic, regular rate and rhythm, soft nontender abdomen, bilateral lower extremity pitting edema +1 to mid calf.  Moving all extremities alert and oriented x 3.    Pneumonia, viral infection, bronchitis, anemia, CHF exacerbation, ACS, electrolyte abnormalities.  Lower suspicion for PE.     Will obtain ED chest pain labs, x-ray, EKG.  Reassess for need for additional imaging such as CT or lab work or transfusion or electrolyte repletion.  Low threshold for admission given patient's age and other comorbidities.  Will reassess for dispo

## 2023-12-18 NOTE — H&P ADULT - HISTORY OF PRESENT ILLNESS
Patient is a 78 year old male with a PMHX of CAD, lung adenocarcinoma s/p RUL lobectomy 09/2022, CHFrEF, HTN, HLD, depression complicated by insomnia presenting to  ED for 4 days of worsening wheezing. He reports he went out in the cold without a jacket last week and subsequently developed a cold followed by audible wheezing. He reports no cough, sore throat, rhinorrhea, earache, headache, sputum production. Patient also reports he has been monitoring his daily weights and has been 181 lbs which is his baseline. He denies any chest pain, palpitations, worsening LE edema, orthopnea.     In the ED patient's temp was 98.1F, HR was 58, BP was 87/59. His troponin notably was 95.9>79.7. He is saturating well on RA.

## 2023-12-18 NOTE — H&P ADULT - NSHPPHYSICALEXAM_GEN_ALL_CORE
Constitutional: Pt lying in bed, awake and alert, NAD  HEENT: EOMI, normal hearing, moist mucous membranes  Neck: Soft and supple, +JVD  Respiratory: +diffuse rhonci across all lung fields +mild crackles bibasilar   Cardiovascular: S1S2+, RRR, no M/G/R  Gastrointestinal: BS+, soft, NT/ND, no guarding, no rebound  Extremities: +1 pitting edema   Vascular: 2+ peripheral pulses  Neurological: AAOx3, no focal deficits  Musculoskeletal: 5/5 strength b/l upper and lower extremities  Skin: No rashes

## 2023-12-18 NOTE — H&P ADULT - NSICDXPASTMEDICALHX_GEN_ALL_CORE_FT
PAST MEDICAL HISTORY:  Anxiety     Bipolar disorder     BPH (benign prostatic hyperplasia)     CAD (coronary artery disease)     Constipation     Depression     Depression     History of CHF (congestive heart failure)     History of inguinal hernia     Lung mass     LV (left ventricular) mural thrombus     Other nonspecific abnormal finding of lung field     SCC (squamous cell carcinoma)     Umbilical hernia, incarcerated     Urinary retention

## 2023-12-18 NOTE — ED PROVIDER NOTE - OBJECTIVE STATEMENT
78-year-old male past medical history of CAD lung cancer status post right lower lobe lobectomy in 2022, CHF, hypertension hyperlipidemia, presents with flulike symptoms, shortness of breath, cough, since Saturday.  Patient endorses he is still able to complete his ADLs, including getting dressed going from room to room without feeling too fatigued or short of breath.  Patient denies fevers chills, GI symptoms, urinary symptoms, chest pain.

## 2023-12-18 NOTE — ED ADULT NURSE NOTE - NSFALLHARMRISKINTERV_ED_ALL_ED
Assistance OOB with selected safe patient handling equipment if applicable/Communicate risk of Fall with Harm to all staff, patient, and family/Provide visual cue: red socks, yellow wristband, yellow gown, etc/Reinforce activity limits and safety measures with patient and family/Bed in lowest position, wheels locked, appropriate side rails in place/Call bell, personal items and telephone in reach/Instruct patient to call for assistance before getting out of bed/chair/stretcher/Non-slip footwear applied when patient is off stretcher/[place] to call system/Physically safe environment - no spills, clutter or unnecessary equipment/Purposeful Proactive Rounding/Room/bathroom lighting operational, light cord in reach Assistance OOB with selected safe patient handling equipment if applicable/Communicate risk of Fall with Harm to all staff, patient, and family/Provide visual cue: red socks, yellow wristband, yellow gown, etc/Reinforce activity limits and safety measures with patient and family/Bed in lowest position, wheels locked, appropriate side rails in place/Call bell, personal items and telephone in reach/Instruct patient to call for assistance before getting out of bed/chair/stretcher/Non-slip footwear applied when patient is off stretcher/[place] to call system/Physically safe environment - no spills, clutter or unnecessary equipment/Purposeful Proactive Rounding/Room/bathroom lighting operational, light cord in reach

## 2023-12-18 NOTE — H&P ADULT - NSHPREVIEWOFSYSTEMS_GEN_ALL_CORE
REVIEW OF SYSTEMS:    CONSTITUTIONAL: +weakness, no fevers or chills  EYES/ENT: No visual changes;  No vertigo or throat pain   NECK: No pain or stiffness  RESPIRATORY: No cough, no hemoptysis; +wheezing   CARDIOVASCULAR: No chest pain or palpitations  GASTROINTESTINAL: No abdominal or epigastric pain. No nausea, vomiting, or hematemesis; No diarrhea or constipation. No melena or hematochezia.  GENITOURINARY: No dysuria, frequency or hematuria  NEUROLOGICAL: No numbness or weakness  SKIN: No itching, rashes

## 2023-12-18 NOTE — H&P ADULT - TREATMENT GUIDELINE COMMENT
Patient agrees to any and all life saving procedures including but not limited to intubation and compressions.

## 2023-12-18 NOTE — H&P ADULT - ATTENDING COMMENTS
I have personally seen and examined patient on the above date.  I discussed the case with Dr Sanz and I agree with findings and plan as detailed per note above, which I have amended where appropriate.    Superseding the above.  78 M with a PMHX of CAD, lung adenocarcinoma s/p RUL lobectomy 09/2022, CHFrEF, HTN, HLD, depression pw URI sxs of cough mostly clear occ yellow phlegm associated with wheezing for the past several days after going out without a jacket several days ago. Denied any associated fevers, sore throat, CP, NVD. +SOB on overexertion.   In ED, I have personally seen and examined patient on the above date.  I discussed the case with Dr Sanz and I agree with findings and plan as detailed per note above, which I have amended where appropriate.    Superseding the above.  78 M with a PMHX of CAD, lung adenocarcinoma s/p RUL lobectomy 09/2022, CHFrEF, HTN, HLD, depression pw URI sxs of cough mostly clear occ yellow phlegm associated with wheezing for the past several days after going out without a jacket several days ago. Denied any associated fevers, sore throat, CP, NVD. +SOB on overexertion.   EKG personally rev. sinus osvaldo at 58bpm +PVC QV1-V6  CT chest as noted above.   AP: COPD exacerabation with +RSV  +/- CHF with BRODY   standing duonebs  IV steroids.   pulmonary consult  weights, I/Os   hold diuretics for a day  prn diuretics to keep weights stable with careful eye on creatinine.   trend trops, serial EKGs, ECHO, cardiology consult.   renal consult.  Fatou ETIENNE reviewed  D/W Dr Alcazar

## 2023-12-18 NOTE — ED PROVIDER NOTE - PHYSICAL EXAMINATION
GEN: Patient awake alert NAD.   HEENT: normocephalic, atraumatic, EOMI, no scleral icterus, moist MM  CARDIAC: RRR, S1, S2, no murmur.   PULM: CTA B/L no wheeze, rhonchi, rales.   ABD: soft NT, ND, no rebound no guarding  MSK: Moving all extremities, +2 pitting edema, 5/5 strength and full ROM in all extremities.     NEURO: A&Ox3, no focal neurological deficits, CN 2-12 grossly intact  SKIN: warm, dry, no rash.

## 2023-12-18 NOTE — ED ADULT NURSE NOTE - OBJECTIVE STATEMENT
Pt presents to ED as sent from urgent care with c/o SOB.  Hx of CHF and Lung CA with resection.  Pt reports worsening SOB and congestion.  On arrival, pt pulse o2 WNL.  Noted to have crackles. Pt presents to ED as sent from urgent care with c/o SOB x 2 weeks.  Hx of CHF and Lung CA with resection.  Pt reports worsening SOB and congestion.  On arrival, pt pulse o2 WNL.  Noted to have crackles.  Maintained on continuous O2 monitoring.

## 2023-12-19 DIAGNOSIS — C34.90 MALIGNANT NEOPLASM OF UNSPECIFIED PART OF UNSPECIFIED BRONCHUS OR LUNG: ICD-10-CM

## 2023-12-19 LAB
ALBUMIN SERPL ELPH-MCNC: 3.1 G/DL — LOW (ref 3.3–5)
ALBUMIN SERPL ELPH-MCNC: 3.1 G/DL — LOW (ref 3.3–5)
ALP SERPL-CCNC: 334 U/L — HIGH (ref 40–120)
ALP SERPL-CCNC: 334 U/L — HIGH (ref 40–120)
ALT FLD-CCNC: 44 U/L — SIGNIFICANT CHANGE UP (ref 10–45)
ALT FLD-CCNC: 44 U/L — SIGNIFICANT CHANGE UP (ref 10–45)
ANION GAP SERPL CALC-SCNC: 13 MMOL/L — SIGNIFICANT CHANGE UP (ref 5–17)
ANION GAP SERPL CALC-SCNC: 13 MMOL/L — SIGNIFICANT CHANGE UP (ref 5–17)
APPEARANCE UR: CLEAR — SIGNIFICANT CHANGE UP
APPEARANCE UR: CLEAR — SIGNIFICANT CHANGE UP
AST SERPL-CCNC: 52 U/L — HIGH (ref 10–40)
AST SERPL-CCNC: 52 U/L — HIGH (ref 10–40)
BASOPHILS # BLD AUTO: 0.02 K/UL — SIGNIFICANT CHANGE UP (ref 0–0.2)
BASOPHILS # BLD AUTO: 0.02 K/UL — SIGNIFICANT CHANGE UP (ref 0–0.2)
BASOPHILS NFR BLD AUTO: 0.4 % — SIGNIFICANT CHANGE UP (ref 0–2)
BASOPHILS NFR BLD AUTO: 0.4 % — SIGNIFICANT CHANGE UP (ref 0–2)
BILIRUB SERPL-MCNC: 1.7 MG/DL — HIGH (ref 0.2–1.2)
BILIRUB SERPL-MCNC: 1.7 MG/DL — HIGH (ref 0.2–1.2)
BILIRUB UR-MCNC: NEGATIVE — SIGNIFICANT CHANGE UP
BILIRUB UR-MCNC: NEGATIVE — SIGNIFICANT CHANGE UP
BUN SERPL-MCNC: 101 MG/DL — HIGH (ref 7–23)
BUN SERPL-MCNC: 101 MG/DL — HIGH (ref 7–23)
CALCIUM SERPL-MCNC: 9.3 MG/DL — SIGNIFICANT CHANGE UP (ref 8.4–10.5)
CALCIUM SERPL-MCNC: 9.3 MG/DL — SIGNIFICANT CHANGE UP (ref 8.4–10.5)
CHLORIDE SERPL-SCNC: 93 MMOL/L — LOW (ref 96–108)
CHLORIDE SERPL-SCNC: 93 MMOL/L — LOW (ref 96–108)
CO2 SERPL-SCNC: 22 MMOL/L — SIGNIFICANT CHANGE UP (ref 22–31)
CO2 SERPL-SCNC: 22 MMOL/L — SIGNIFICANT CHANGE UP (ref 22–31)
COLOR SPEC: YELLOW — SIGNIFICANT CHANGE UP
COLOR SPEC: YELLOW — SIGNIFICANT CHANGE UP
CREAT SERPL-MCNC: 2.15 MG/DL — HIGH (ref 0.5–1.3)
CREAT SERPL-MCNC: 2.15 MG/DL — HIGH (ref 0.5–1.3)
DIFF PNL FLD: NEGATIVE — SIGNIFICANT CHANGE UP
DIFF PNL FLD: NEGATIVE — SIGNIFICANT CHANGE UP
EGFR: 31 ML/MIN/1.73M2 — LOW
EGFR: 31 ML/MIN/1.73M2 — LOW
EOSINOPHIL # BLD AUTO: 0.02 K/UL — SIGNIFICANT CHANGE UP (ref 0–0.5)
EOSINOPHIL # BLD AUTO: 0.02 K/UL — SIGNIFICANT CHANGE UP (ref 0–0.5)
EOSINOPHIL NFR BLD AUTO: 0.4 % — SIGNIFICANT CHANGE UP (ref 0–6)
EOSINOPHIL NFR BLD AUTO: 0.4 % — SIGNIFICANT CHANGE UP (ref 0–6)
GLUCOSE SERPL-MCNC: 140 MG/DL — HIGH (ref 70–99)
GLUCOSE SERPL-MCNC: 140 MG/DL — HIGH (ref 70–99)
GLUCOSE UR QL: NEGATIVE MG/DL — SIGNIFICANT CHANGE UP
GLUCOSE UR QL: NEGATIVE MG/DL — SIGNIFICANT CHANGE UP
HCT VFR BLD CALC: 32.7 % — LOW (ref 39–50)
HCT VFR BLD CALC: 32.7 % — LOW (ref 39–50)
HGB BLD-MCNC: 11 G/DL — LOW (ref 13–17)
HGB BLD-MCNC: 11 G/DL — LOW (ref 13–17)
IMM GRANULOCYTES NFR BLD AUTO: 0.4 % — SIGNIFICANT CHANGE UP (ref 0–0.9)
IMM GRANULOCYTES NFR BLD AUTO: 0.4 % — SIGNIFICANT CHANGE UP (ref 0–0.9)
KETONES UR-MCNC: NEGATIVE MG/DL — SIGNIFICANT CHANGE UP
KETONES UR-MCNC: NEGATIVE MG/DL — SIGNIFICANT CHANGE UP
LEUKOCYTE ESTERASE UR-ACNC: NEGATIVE — SIGNIFICANT CHANGE UP
LEUKOCYTE ESTERASE UR-ACNC: NEGATIVE — SIGNIFICANT CHANGE UP
LYMPHOCYTES # BLD AUTO: 0.64 K/UL — LOW (ref 1–3.3)
LYMPHOCYTES # BLD AUTO: 0.64 K/UL — LOW (ref 1–3.3)
LYMPHOCYTES # BLD AUTO: 13.3 % — SIGNIFICANT CHANGE UP (ref 13–44)
LYMPHOCYTES # BLD AUTO: 13.3 % — SIGNIFICANT CHANGE UP (ref 13–44)
MCHC RBC-ENTMCNC: 32.4 PG — SIGNIFICANT CHANGE UP (ref 27–34)
MCHC RBC-ENTMCNC: 32.4 PG — SIGNIFICANT CHANGE UP (ref 27–34)
MCHC RBC-ENTMCNC: 33.6 GM/DL — SIGNIFICANT CHANGE UP (ref 32–36)
MCHC RBC-ENTMCNC: 33.6 GM/DL — SIGNIFICANT CHANGE UP (ref 32–36)
MCV RBC AUTO: 96.5 FL — SIGNIFICANT CHANGE UP (ref 80–100)
MCV RBC AUTO: 96.5 FL — SIGNIFICANT CHANGE UP (ref 80–100)
MONOCYTES # BLD AUTO: 0.2 K/UL — SIGNIFICANT CHANGE UP (ref 0–0.9)
MONOCYTES # BLD AUTO: 0.2 K/UL — SIGNIFICANT CHANGE UP (ref 0–0.9)
MONOCYTES NFR BLD AUTO: 4.2 % — SIGNIFICANT CHANGE UP (ref 2–14)
MONOCYTES NFR BLD AUTO: 4.2 % — SIGNIFICANT CHANGE UP (ref 2–14)
NEUTROPHILS # BLD AUTO: 3.91 K/UL — SIGNIFICANT CHANGE UP (ref 1.8–7.4)
NEUTROPHILS # BLD AUTO: 3.91 K/UL — SIGNIFICANT CHANGE UP (ref 1.8–7.4)
NEUTROPHILS NFR BLD AUTO: 81.3 % — HIGH (ref 43–77)
NEUTROPHILS NFR BLD AUTO: 81.3 % — HIGH (ref 43–77)
NITRITE UR-MCNC: NEGATIVE — SIGNIFICANT CHANGE UP
NITRITE UR-MCNC: NEGATIVE — SIGNIFICANT CHANGE UP
NRBC # BLD: 0 /100 WBCS — SIGNIFICANT CHANGE UP (ref 0–0)
NRBC # BLD: 0 /100 WBCS — SIGNIFICANT CHANGE UP (ref 0–0)
PH UR: 5 — SIGNIFICANT CHANGE UP (ref 5–8)
PH UR: 5 — SIGNIFICANT CHANGE UP (ref 5–8)
PLATELET # BLD AUTO: 91 K/UL — LOW (ref 150–400)
PLATELET # BLD AUTO: 91 K/UL — LOW (ref 150–400)
POTASSIUM SERPL-MCNC: 4.4 MMOL/L — SIGNIFICANT CHANGE UP (ref 3.5–5.3)
POTASSIUM SERPL-MCNC: 4.4 MMOL/L — SIGNIFICANT CHANGE UP (ref 3.5–5.3)
POTASSIUM SERPL-SCNC: 4.4 MMOL/L — SIGNIFICANT CHANGE UP (ref 3.5–5.3)
POTASSIUM SERPL-SCNC: 4.4 MMOL/L — SIGNIFICANT CHANGE UP (ref 3.5–5.3)
PROT SERPL-MCNC: 7.7 G/DL — SIGNIFICANT CHANGE UP (ref 6–8.3)
PROT SERPL-MCNC: 7.7 G/DL — SIGNIFICANT CHANGE UP (ref 6–8.3)
PROT UR-MCNC: NEGATIVE MG/DL — SIGNIFICANT CHANGE UP
PROT UR-MCNC: NEGATIVE MG/DL — SIGNIFICANT CHANGE UP
RBC # BLD: 3.39 M/UL — LOW (ref 4.2–5.8)
RBC # BLD: 3.39 M/UL — LOW (ref 4.2–5.8)
RBC # FLD: 17.3 % — HIGH (ref 10.3–14.5)
RBC # FLD: 17.3 % — HIGH (ref 10.3–14.5)
SODIUM SERPL-SCNC: 128 MMOL/L — LOW (ref 135–145)
SODIUM SERPL-SCNC: 128 MMOL/L — LOW (ref 135–145)
SP GR SPEC: 1.01 — SIGNIFICANT CHANGE UP (ref 1–1.03)
SP GR SPEC: 1.01 — SIGNIFICANT CHANGE UP (ref 1–1.03)
TROPONIN I, HIGH SENSITIVITY RESULT: 81.4 NG/L — HIGH
TROPONIN I, HIGH SENSITIVITY RESULT: 81.4 NG/L — HIGH
UROBILINOGEN FLD QL: 0.2 MG/DL — SIGNIFICANT CHANGE UP (ref 0.2–1)
UROBILINOGEN FLD QL: 0.2 MG/DL — SIGNIFICANT CHANGE UP (ref 0.2–1)
WBC # BLD: 4.81 K/UL — SIGNIFICANT CHANGE UP (ref 3.8–10.5)
WBC # BLD: 4.81 K/UL — SIGNIFICANT CHANGE UP (ref 3.8–10.5)
WBC # FLD AUTO: 4.81 K/UL — SIGNIFICANT CHANGE UP (ref 3.8–10.5)
WBC # FLD AUTO: 4.81 K/UL — SIGNIFICANT CHANGE UP (ref 3.8–10.5)

## 2023-12-19 PROCEDURE — 93010 ELECTROCARDIOGRAM REPORT: CPT

## 2023-12-19 PROCEDURE — 76775 US EXAM ABDO BACK WALL LIM: CPT | Mod: 26

## 2023-12-19 PROCEDURE — 99223 1ST HOSP IP/OBS HIGH 75: CPT

## 2023-12-19 PROCEDURE — 93306 TTE W/DOPPLER COMPLETE: CPT | Mod: 26

## 2023-12-19 PROCEDURE — 99233 SBSQ HOSP IP/OBS HIGH 50: CPT

## 2023-12-19 PROCEDURE — 99222 1ST HOSP IP/OBS MODERATE 55: CPT

## 2023-12-19 RX ORDER — BUMETANIDE 0.25 MG/ML
2 INJECTION INTRAMUSCULAR; INTRAVENOUS EVERY 12 HOURS
Refills: 0 | Status: DISCONTINUED | OUTPATIENT
Start: 2023-12-19 | End: 2023-12-19

## 2023-12-19 RX ORDER — CEFTRIAXONE 500 MG/1
1000 INJECTION, POWDER, FOR SOLUTION INTRAMUSCULAR; INTRAVENOUS EVERY 24 HOURS
Refills: 0 | Status: DISCONTINUED | OUTPATIENT
Start: 2023-12-19 | End: 2023-12-19

## 2023-12-19 RX ORDER — BUDESONIDE AND FORMOTEROL FUMARATE DIHYDRATE 160; 4.5 UG/1; UG/1
2 AEROSOL RESPIRATORY (INHALATION)
Refills: 0 | Status: DISCONTINUED | OUTPATIENT
Start: 2023-12-19 | End: 2023-12-21

## 2023-12-19 RX ORDER — BUMETANIDE 0.25 MG/ML
2 INJECTION INTRAMUSCULAR; INTRAVENOUS DAILY
Refills: 0 | Status: DISCONTINUED | OUTPATIENT
Start: 2023-12-19 | End: 2023-12-21

## 2023-12-19 RX ORDER — FLUOXETINE HCL 10 MG
1 CAPSULE ORAL
Refills: 0 | DISCHARGE
Start: 2023-12-19

## 2023-12-19 RX ORDER — HEPARIN SODIUM 5000 [USP'U]/ML
5000 INJECTION INTRAVENOUS; SUBCUTANEOUS EVERY 8 HOURS
Refills: 0 | Status: DISCONTINUED | OUTPATIENT
Start: 2023-12-19 | End: 2023-12-21

## 2023-12-19 RX ADMIN — TAMSULOSIN HYDROCHLORIDE 0.4 MILLIGRAM(S): 0.4 CAPSULE ORAL at 23:22

## 2023-12-19 RX ADMIN — Medication 81 MILLIGRAM(S): at 10:03

## 2023-12-19 RX ADMIN — Medication 3 MILLILITER(S): at 09:50

## 2023-12-19 RX ADMIN — HEPARIN SODIUM 5000 UNIT(S): 5000 INJECTION INTRAVENOUS; SUBCUTANEOUS at 14:25

## 2023-12-19 RX ADMIN — AZITHROMYCIN 255 MILLIGRAM(S): 500 TABLET, FILM COATED ORAL at 00:32

## 2023-12-19 RX ADMIN — Medication 1200 MILLIGRAM(S): at 22:41

## 2023-12-19 RX ADMIN — Medication 3 MILLILITER(S): at 16:14

## 2023-12-19 RX ADMIN — Medication 3 MILLILITER(S): at 21:18

## 2023-12-19 RX ADMIN — Medication 40 MILLIGRAM(S): at 00:32

## 2023-12-19 RX ADMIN — MIRTAZAPINE 7.5 MILLIGRAM(S): 45 TABLET, ORALLY DISINTEGRATING ORAL at 22:41

## 2023-12-19 RX ADMIN — CEFTRIAXONE 100 MILLIGRAM(S): 500 INJECTION, POWDER, FOR SOLUTION INTRAMUSCULAR; INTRAVENOUS at 10:42

## 2023-12-19 RX ADMIN — Medication 40 MILLIGRAM(S): at 10:02

## 2023-12-19 RX ADMIN — Medication 20 MILLIGRAM(S): at 10:02

## 2023-12-19 RX ADMIN — Medication 1200 MILLIGRAM(S): at 10:03

## 2023-12-19 RX ADMIN — CLOPIDOGREL BISULFATE 75 MILLIGRAM(S): 75 TABLET, FILM COATED ORAL at 10:03

## 2023-12-19 RX ADMIN — HEPARIN SODIUM 5000 UNIT(S): 5000 INJECTION INTRAVENOUS; SUBCUTANEOUS at 22:40

## 2023-12-19 RX ADMIN — Medication 3 MILLILITER(S): at 06:11

## 2023-12-19 RX ADMIN — BUDESONIDE AND FORMOTEROL FUMARATE DIHYDRATE 2 PUFF(S): 160; 4.5 AEROSOL RESPIRATORY (INHALATION) at 21:18

## 2023-12-19 NOTE — PHYSICAL THERAPY INITIAL EVALUATION ADULT - PERTINENT HX OF CURRENT PROBLEM, REHAB EVAL
Patient is a 78 year old male with a PMHX of CAD, lung adenocarcinoma s/p RUL lobectomy 09/2022, CHFrEF, HTN, HLD, depression complicated by insomnia presenting to  ED for 4 days of worsening wheezing. He reports he went out in the cold without a jacket last week and subsequently developed a cold followed by audible wheezing. He reports no cough, sore throat, rhinorrhea, earache, headache, sputum production. Patient also reports he has been monitoring his daily weights and has been 181 lbs which is his baseline. He denies any chest pain, palpitations, worsening LE edema, orthopnea.

## 2023-12-19 NOTE — PATIENT PROFILE ADULT - FALL HARM RISK - UNIVERSAL INTERVENTIONS
Bed in lowest position, wheels locked, appropriate side rails in place/Call bell, personal items and telephone in reach/Instruct patient to call for assistance before getting out of bed or chair/Non-slip footwear when patient is out of bed/[place] to call system/Physically safe environment - no spills, clutter or unnecessary equipment/Purposeful Proactive Rounding/Room/bathroom lighting operational, light cord in reach Bed in lowest position, wheels locked, appropriate side rails in place/Call bell, personal items and telephone in reach/Instruct patient to call for assistance before getting out of bed or chair/Non-slip footwear when patient is out of bed/[place] to call system/Physically safe environment - no spills, clutter or unnecessary equipment/Purposeful Proactive Rounding/Room/bathroom lighting operational, light cord in reach

## 2023-12-19 NOTE — CONSULT NOTE ADULT - SUBJECTIVE AND OBJECTIVE BOX
ELYSE MALAVE  [id number]      HPI: 78 year old male with a PMHX of CAD, lung adenocarcinoma s/p RUL lobectomy 2022, CHFrEF, HTN, HLD, c/o 4 days of worsening wheezing. denies cough, sore throat, rhinorrhea, earache, headache, sputum production. pt states he is at his  baseline weight, no report of chest pain, palpitations, worsening LE edema, orthopnea.       ALLERGIES:  No Known Allergies      PAST MEDICAL & SURGICAL HISTORY:  BPH (benign prostatic hyperplasia)      Bipolar disorder      Depression      Anxiety      Umbilical hernia, incarcerated      Depression      Constipation      Urinary retention      CAD (coronary artery disease)      SCC (squamous cell carcinoma)      Lung mass      Other nonspecific abnormal finding of lung field      LV (left ventricular) mural thrombus      History of inguinal hernia      History of CHF (congestive heart failure)      History of arthroscopy of knee  Right,       History of tonsillectomy        History of hernia repair      H/O coronary angiogram            CURRENT MEDICATIONS:  MEDICATIONS  (STANDING):  albuterol/ipratropium for Nebulization 3 milliLiter(s) Nebulizer every 6 hours  aspirin enteric coated 81 milliGRAM(s) Oral daily  azithromycin  IVPB      azithromycin  IVPB 500 milliGRAM(s) IV Intermittent every 24 hours  budesonide 160 MICROgram(s)/formoterol 4.5 MICROgram(s) Inhaler 2 Puff(s) Inhalation two times a day  cefTRIAXone   IVPB 1000 milliGRAM(s) IV Intermittent every 24 hours  clopidogrel Tablet 75 milliGRAM(s) Oral daily  FLUoxetine 20 milliGRAM(s) Oral <User Schedule>  guaiFENesin ER 1200 milliGRAM(s) Oral every 12 hours  heparin   Injectable 5000 Unit(s) SubCutaneous every 8 hours  methylPREDNISolone sodium succinate Injectable 40 milliGRAM(s) IV Push daily  metoprolol succinate ER 25 milliGRAM(s) Oral daily  mirtazapine 7.5 milliGRAM(s) Oral at bedtime  tamsulosin 0.4 milliGRAM(s) Oral at bedtime    MEDICATIONS  (PRN):  acetaminophen     Tablet .. 650 milliGRAM(s) Oral every 6 hours PRN Temp greater or equal to 38C (100.4F), Mild Pain (1 - 3)  aluminum hydroxide/magnesium hydroxide/simethicone Suspension 30 milliLiter(s) Oral every 4 hours PRN Dyspepsia  melatonin 3 milliGRAM(s) Oral at bedtime PRN Insomnia  ondansetron Injectable 4 milliGRAM(s) IV Push every 8 hours PRN Nausea and/or Vomiting  temazepam 15 milliGRAM(s) Oral at bedtime PRN Insomnia      SOCIAL HISTORY:  denies etoh, tobacco and drug use    FAMILY HISTORY:  Family history of depression (Mother)    FH: CAD (coronary artery disease) (brothers)    Family history of diabetes mellitus (DM) (Sibling)        ROS:  All 10 systems reviewed and positives noted in HPI    OBJECTIVE:    VITAL SIGNS:  Vital Signs Last 24 Hrs  T(C): 36.4 (18 Dec 2023 20:00), Max: 36.7 (18 Dec 2023 15:16)  T(F): 97.6 (18 Dec 2023 20:00), Max: 98.1 (18 Dec 2023 15:16)  HR: 67 (19 Dec 2023 10:26) (58 - 73)  BP: 96/62 (18 Dec 2023 20:00) (87/59 - 103/68)  BP(mean): --  RR: 15 (18 Dec 2023 20:00) (12 - 20)  SpO2: 96% (19 Dec 2023 10:26) (96% - 100%)    Parameters below as of 19 Dec 2023 10:26  Patient On (Oxygen Delivery Method): room air        PHYSICAL EXAM:  General: no distress  HEENT: sclera anicteric  Neck: supple, no carotid bruits b/l  CVS: JVP ~ 7 cm H20, RRR, +murmur  Chest: audible coarse crackles  Abdomen: non-distended  Extremities: b/l l/e edema +1  Neuro: awake, alert & oriented x 3  Psych: normal affect      LABS:                        11.0   4.81  )-----------( 91       ( 19 Dec 2023 07:52 )             32.7     12-    128<L>  |  93<L>  |  101<H>  ----------------------------<  140<H>  4.4   |  22  |  2.15<H>    Ca    9.3      19 Dec 2023 07:52    TPro  7.7  /  Alb  3.1<L>  /  TBili  1.7<H>  /  DBili  x   /  AST  52<H>  /  ALT  44  /  AlkPhos  334<H>  12-        EC2023 Sinus with 1st degree block, similar to 2023      TTE: < from: TTE with Doppler (w/Cont) (23 @ 11:49) >  1. Mitral annular calcification, otherwise normal mitral  valve. Moderate-severe mitral regurgitation.  2. Calcified trileaflet aortic valve with normal opening.  Mild aortic regurgitation.  3. Moderately dilated left atrium.  LA volume index = 42  cc/m2.  4. Mild left ventricular enlargement.  5. Severe global left ventricular systolic dysfunction.  Endocardial visualization enhanced with intravenous  injection of echo contrast (Definity).  No LV thrombus  seen.  6. Moderate diastolic dysfunction (Stage II).  7. Severe right atrial enlargement.  8. Right ventricular enlargement with decreased right  ventricular systolic function.  9. Estimated right ventricular systolic pressure equals 59  mm Hg, assuming right atrial pressure equals 10 mm Hg,  consistent with moderate pulmonary hypertension.  Ejection Fraction (Modified Pizarro Rule): 23 %       EYLSE MALAVE  [id number]      HPI: 78 year old male with a PMHX of CAD, lung adenocarcinoma s/p RUL lobectomy 2022, CHFrEF, HTN, HLD, c/o 4 days of worsening wheezing. denies cough, sore throat, rhinorrhea, earache, headache, sputum production. pt states he is at his  baseline weight, no report of chest pain, palpitations, worsening LE edema, orthopnea.       ALLERGIES:  No Known Allergies      PAST MEDICAL & SURGICAL HISTORY:  BPH (benign prostatic hyperplasia)      Bipolar disorder      Depression      Anxiety      Umbilical hernia, incarcerated      Depression      Constipation      Urinary retention      CAD (coronary artery disease)      SCC (squamous cell carcinoma)      Lung mass      Other nonspecific abnormal finding of lung field      LV (left ventricular) mural thrombus      History of inguinal hernia      History of CHF (congestive heart failure)      History of arthroscopy of knee  Right,       History of tonsillectomy        History of hernia repair      H/O coronary angiogram            CURRENT MEDICATIONS:  MEDICATIONS  (STANDING):  albuterol/ipratropium for Nebulization 3 milliLiter(s) Nebulizer every 6 hours  aspirin enteric coated 81 milliGRAM(s) Oral daily  azithromycin  IVPB      azithromycin  IVPB 500 milliGRAM(s) IV Intermittent every 24 hours  budesonide 160 MICROgram(s)/formoterol 4.5 MICROgram(s) Inhaler 2 Puff(s) Inhalation two times a day  cefTRIAXone   IVPB 1000 milliGRAM(s) IV Intermittent every 24 hours  clopidogrel Tablet 75 milliGRAM(s) Oral daily  FLUoxetine 20 milliGRAM(s) Oral <User Schedule>  guaiFENesin ER 1200 milliGRAM(s) Oral every 12 hours  heparin   Injectable 5000 Unit(s) SubCutaneous every 8 hours  methylPREDNISolone sodium succinate Injectable 40 milliGRAM(s) IV Push daily  metoprolol succinate ER 25 milliGRAM(s) Oral daily  mirtazapine 7.5 milliGRAM(s) Oral at bedtime  tamsulosin 0.4 milliGRAM(s) Oral at bedtime    MEDICATIONS  (PRN):  acetaminophen     Tablet .. 650 milliGRAM(s) Oral every 6 hours PRN Temp greater or equal to 38C (100.4F), Mild Pain (1 - 3)  aluminum hydroxide/magnesium hydroxide/simethicone Suspension 30 milliLiter(s) Oral every 4 hours PRN Dyspepsia  melatonin 3 milliGRAM(s) Oral at bedtime PRN Insomnia  ondansetron Injectable 4 milliGRAM(s) IV Push every 8 hours PRN Nausea and/or Vomiting  temazepam 15 milliGRAM(s) Oral at bedtime PRN Insomnia      SOCIAL HISTORY:  denies etoh, tobacco and drug use    FAMILY HISTORY:  Family history of depression (Mother)    FH: CAD (coronary artery disease) (brothers)    Family history of diabetes mellitus (DM) (Sibling)        ROS:  All 10 systems reviewed and positives noted in HPI    OBJECTIVE:    VITAL SIGNS:  Vital Signs Last 24 Hrs  T(C): 36.4 (18 Dec 2023 20:00), Max: 36.7 (18 Dec 2023 15:16)  T(F): 97.6 (18 Dec 2023 20:00), Max: 98.1 (18 Dec 2023 15:16)  HR: 67 (19 Dec 2023 10:26) (58 - 73)  BP: 96/62 (18 Dec 2023 20:00) (87/59 - 103/68)  BP(mean): --  RR: 15 (18 Dec 2023 20:00) (12 - 20)  SpO2: 96% (19 Dec 2023 10:26) (96% - 100%)    Parameters below as of 19 Dec 2023 10:26  Patient On (Oxygen Delivery Method): room air        PHYSICAL EXAM:  General: no distress  HEENT: sclera anicteric  Neck: supple, no carotid bruits b/l  CVS: JVP ~ 7 cm H20, RRR, +murmur  Chest: audible coarse crackles  Abdomen: non-distended  Extremities: b/l l/e edema +1  Neuro: awake, alert & oriented x 3  Psych: normal affect      LABS:                        11.0   4.81  )-----------( 91       ( 19 Dec 2023 07:52 )             32.7     12-    128<L>  |  93<L>  |  101<H>  ----------------------------<  140<H>  4.4   |  22  |  2.15<H>    Ca    9.3      19 Dec 2023 07:52    TPro  7.7  /  Alb  3.1<L>  /  TBili  1.7<H>  /  DBili  x   /  AST  52<H>  /  ALT  44  /  AlkPhos  334<H>  12-        EC2023 Sinus with 1st degree block, similar to 2023      TTE: < from: TTE with Doppler (w/Cont) (23 @ 11:49) >  1. Mitral annular calcification, otherwise normal mitral  valve. Moderate-severe mitral regurgitation.  2. Calcified trileaflet aortic valve with normal opening.  Mild aortic regurgitation.  3. Moderately dilated left atrium.  LA volume index = 42  cc/m2.  4. Mild left ventricular enlargement.  5. Severe global left ventricular systolic dysfunction.  Endocardial visualization enhanced with intravenous  injection of echo contrast (Definity).  No LV thrombus  seen.  6. Moderate diastolic dysfunction (Stage II).  7. Severe right atrial enlargement.  8. Right ventricular enlargement with decreased right  ventricular systolic function.  9. Estimated right ventricular systolic pressure equals 59  mm Hg, assuming right atrial pressure equals 10 mm Hg,  consistent with moderate pulmonary hypertension.  Ejection Fraction (Modified Pizarro Rule): 23 %       ELYSE MALAVE  [id number]      HPI: 78 year old male with a PMHX of CAD, lung adenocarcinoma s/p RUL lobectomy 2022, CHFrEF, HTN, HLD, c/o 4 days of worsening wheezing. Denies cough, sore throat, rhinorrhea, earache, headache, sputum production. Patient states he is at his baseline weight, no report of chest pain, palpitations, worsening lower extremity edema or orthopnea.     ALLERGIES:  No Known Allergies      CURRENT MEDICATIONS:  MEDICATIONS  (STANDING):  albuterol/ipratropium for Nebulization 3 milliLiter(s) Nebulizer every 6 hours  aspirin enteric coated 81 milliGRAM(s) Oral daily  azithromycin  IVPB      azithromycin  IVPB 500 milliGRAM(s) IV Intermittent every 24 hours  budesonide 160 MICROgram(s)/formoterol 4.5 MICROgram(s) Inhaler 2 Puff(s) Inhalation two times a day  cefTRIAXone   IVPB 1000 milliGRAM(s) IV Intermittent every 24 hours  clopidogrel Tablet 75 milliGRAM(s) Oral daily  FLUoxetine 20 milliGRAM(s) Oral <User Schedule>  guaiFENesin ER 1200 milliGRAM(s) Oral every 12 hours  heparin   Injectable 5000 Unit(s) SubCutaneous every 8 hours  methylPREDNISolone sodium succinate Injectable 40 milliGRAM(s) IV Push daily  metoprolol succinate ER 25 milliGRAM(s) Oral daily  mirtazapine 7.5 milliGRAM(s) Oral at bedtime  tamsulosin 0.4 milliGRAM(s) Oral at bedtime    ROS:  All 10 systems reviewed and positives noted in HPI    OBJECTIVE:    VITAL SIGNS:  Vital Signs Last 24 Hrs  T(C): 36.4 (18 Dec 2023 20:00), Max: 36.7 (18 Dec 2023 15:16)  T(F): 97.6 (18 Dec 2023 20:00), Max: 98.1 (18 Dec 2023 15:16)  HR: 67 (19 Dec 2023 10:26) (58 - 73)  BP: 96/62 (18 Dec 2023 20:00) (87/59 - 103/68)  BP(mean): --  RR: 15 (18 Dec 2023 20:00) (12 - 20)  SpO2: 96% (19 Dec 2023 10:26) (96% - 100%)    Parameters below as of 19 Dec 2023 10:26  Patient On (Oxygen Delivery Method): room air        PHYSICAL EXAM:  General: no distress  HEENT: sclera anicteric  Neck: supple  CVS: JVP ~ 7 cm H20, RRR, +murmur  Chest: audible coarse crackles  Abdomen: non-distended  Extremities: b/l l/e edema +1  Neuro: awake, alert & oriented x 3  Psych: normal affect      LABS:                        11.0   4.81  )-----------( 91       ( 19 Dec 2023 07:52 )             32.7         128<L>  |  93<L>  |  101<H>  ----------------------------<  140<H>  4.4   |  22  |  2.15<H>    Ca    9.3      19 Dec 2023 07:52    TPro  7.7  /  Alb  3.1<L>  /  TBili  1.7<H>  /  DBili  x   /  AST  52<H>  /  ALT  44  /  AlkPhos  334<H>          EC2023 Sinus with 1st degree block, similar to 2023      TTE: < from: TTE with Doppler (w/Cont) (23 @ 11:49) >  1. Mitral annular calcification, otherwise normal mitral  valve. Moderate-severe mitral regurgitation.  2. Calcified trileaflet aortic valve with normal opening.  Mild aortic regurgitation.  3. Moderately dilated left atrium.  LA volume index = 42  cc/m2.  4. Mild left ventricular enlargement.  5. Severe global left ventricular systolic dysfunction.  Endocardial visualization enhanced with intravenous  injection of echo contrast (Definity).  No LV thrombus  seen.  6. Moderate diastolic dysfunction (Stage II).  7. Severe right atrial enlargement.  8. Right ventricular enlargement with decreased right  ventricular systolic function.  9. Estimated right ventricular systolic pressure equals 59  mm Hg, assuming right atrial pressure equals 10 mm Hg,  consistent with moderate pulmonary hypertension.  Ejection Fraction (Modified Pizarro Rule): 23 %       ELYSE MALAVE  [id number]      HPI: 78 year old male with a PMHX of CAD, lung adenocarcinoma s/p RUL lobectomy 2022, CHFrEF, HTN, HLD, c/o 4 days of worsening wheezing. Denies cough, sore throat, rhinorrhea, earache, headache, sputum production. Patient states he is at his baseline weight, no report of chest pain, palpitations, worsening lower extremity edema or orthopnea.     ALLERGIES:  No Known Allergies      CURRENT MEDICATIONS:  MEDICATIONS  (STANDING):  albuterol/ipratropium for Nebulization 3 milliLiter(s) Nebulizer every 6 hours  aspirin enteric coated 81 milliGRAM(s) Oral daily  azithromycin  IVPB      azithromycin  IVPB 500 milliGRAM(s) IV Intermittent every 24 hours  budesonide 160 MICROgram(s)/formoterol 4.5 MICROgram(s) Inhaler 2 Puff(s) Inhalation two times a day  cefTRIAXone   IVPB 1000 milliGRAM(s) IV Intermittent every 24 hours  clopidogrel Tablet 75 milliGRAM(s) Oral daily  FLUoxetine 20 milliGRAM(s) Oral <User Schedule>  guaiFENesin ER 1200 milliGRAM(s) Oral every 12 hours  heparin   Injectable 5000 Unit(s) SubCutaneous every 8 hours  methylPREDNISolone sodium succinate Injectable 40 milliGRAM(s) IV Push daily  metoprolol succinate ER 25 milliGRAM(s) Oral daily  mirtazapine 7.5 milliGRAM(s) Oral at bedtime  tamsulosin 0.4 milliGRAM(s) Oral at bedtime    ROS:  All 10 systems reviewed and positives noted in HPI    OBJECTIVE:    VITAL SIGNS:  Vital Signs Last 24 Hrs  T(C): 36.4 (18 Dec 2023 20:00), Max: 36.7 (18 Dec 2023 15:16)  T(F): 97.6 (18 Dec 2023 20:00), Max: 98.1 (18 Dec 2023 15:16)  HR: 67 (19 Dec 2023 10:26) (58 - 73)  BP: 96/62 (18 Dec 2023 20:00) (87/59 - 103/68)  BP(mean): --  RR: 15 (18 Dec 2023 20:00) (12 - 20)  SpO2: 96% (19 Dec 2023 10:26) (96% - 100%)    Parameters below as of 19 Dec 2023 10:26  Patient On (Oxygen Delivery Method): room air        PHYSICAL EXAM:  General: no distress  HEENT: sclera anicteric  Neck: supple  CVS: JVP ~ 7 cm H20, RRR, +murmur  Chest: audible coarse crackles  Abdomen: non-distended  Extremities: b/l l/e edema +1  Neuro: awake, alert & oriented x 3  Psych: normal affect      LABS:                        11.0   4.81  )-----------( 91       ( 19 Dec 2023 07:52 )             32.7         128<L>  |  93<L>  |  101<H>  ----------------------------<  140<H>  4.4   |  22  |  2.15<H>    Ca    9.3      19 Dec 2023 07:52    TPro  7.7  /  Alb  3.1<L>  /  TBili  1.7<H>  /  DBili  x   /  AST  52<H>  /  ALT  44  /  AlkPhos  334<H>          EC2023 Sinus with 1st degree block, similar to 2023      TTE: < from: TTE with Doppler (w/Cont) (23 @ 11:49) >  1. Mitral annular calcification, otherwise normal mitral  valve. Moderate-severe mitral regurgitation.  2. Calcified trileaflet aortic valve with normal opening.  Mild aortic regurgitation.  3. Moderately dilated left atrium.  LA volume index = 42  cc/m2.  4. Mild left ventricular enlargement.  5. Severe global left ventricular systolic dysfunction.  Endocardial visualization enhanced with intravenous  injection of echo contrast (Definity).  No LV thrombus  seen.  6. Moderate diastolic dysfunction (Stage II).  7. Severe right atrial enlargement.  8. Right ventricular enlargement with decreased right  ventricular systolic function.  9. Estimated right ventricular systolic pressure equals 59  mm Hg, assuming right atrial pressure equals 10 mm Hg,  consistent with moderate pulmonary hypertension.  Ejection Fraction (Modified Pizarro Rule): 23 %

## 2023-12-19 NOTE — CONSULT NOTE ADULT - ASSESSMENT
Assessment: 78 year old male with a PMHX of CAD, lung adenocarcinoma s/p RUL lobectomy 09/2022, HFrEF, HTN, HLD, depression complicated by insomnia c/o 4 days of worsening wheezing being admitted for severe bronchitis 2/2 to RSV infection and course complicated by BRODY and hyponatremia. cardiology consulted for CHF and elevated trops. pt denies sob and chest pain. no fevers, chills or cough.     Trops> 95>79>81  RVP + RSV  CT chest with bibasilar pleural effusions, GGO 2/2 pna vs malignancy, and HARRY nodular lesion  Previous TTE 7/2023 with ef 23%, severe right atrial enlargement, rv enlargement with reduced rv systolic function and severe MR     Recommendations;  #HFrEF  - appears to be on GDMT at home (bumex, farxiga, spirinolactone, metoprolol, entresto)  - bumex was on hold d/t BRODY, restarted bumex 2 bid as per pulm, recc renal assistance re: diuresis iso brody and close monitoring of kidney function and electrolytes  - repeat TTE to eval EF   - can restart previous meds (entresto, spirinolactone) when kidney function stabilizes    # elevated trops  - likely demand iso viral infection vs fluid overload  - trend to peak  - no active signs of ACS and ekg non ischemic    Pt follows Dr Win SCHUMACHER-BC Assessment: 78 year old male with a PMHX of CAD, lung adenocarcinoma s/p RUL lobectomy 09/2022, HFrEF, HTN, HLD, depression complicated by insomnia c/o 4 days of worsening wheezing being admitted for severe bronchitis 2/2 to RSV infection and course complicated by BRODY and hyponatremia. cardiology consulted for CHF and elevated trops. pt denies sob and chest pain. no fevers, chills or cough.     Trops> 95>79>81  RVP + RSV  CT chest with bibasilar pleural effusions, GGO 2/2 pna vs malignancy, and HARRY nodular lesion  Previous TTE 7/2023 with ef 23%, severe right atrial enlargement, rv enlargement with reduced rv systolic function and severe MR     Recommendations;  #HFrEF  - appears to be on GDMT at home (bumex, farxiga, spirinolactone, metoprolol, entresto)  - bumex was on hold d/t BRODY, restarted bumex 2 bid as per pulm, recc renal assistance re: diuresis iso brody and close monitoring of kidney function and electrolytes  - repeat TTE to eval EF   - can restart previous meds (entresto, spirinolactone) when kidney function stabilizes    # elevated trops  - likely demand iso viral infection vs fluid overload  - trend to peak  - no active signs of ACS and ekg non ischemic    Pt follows Dr Walden cardiology and Dr Shell HF    Cathy TAPIACNP-BC Assessment: 78 year old male with a PMHX of CAD, Lung adenocarcinoma s/p RUL lobectomy 09/2022, HFrEF, HTN, HLD presents with 4 days of worsening wheezing being admitted for severe bronchitis 2/2 to RSV infection and course complicated by acute renal injury and hyponatremia. Cardiology consulted for CHF and elevated troponins. Patient denies dyspnea or angina.      Trops> 95>79>81  RVP + RSV  CT chest with bibasilar pleural effusions, pna vs malignancy, and HARRY nodular lesion  Previous TTE 7/2023 with ef 23%, severe right atrial enlargement, rv enlargement with reduced rv systolic function and severe MR     Recommendations;  #HFrEF  - appears to be on GDMT at home (bumex, farxiga, spirinolactone, metoprolol, entresto)  - Bumex was on hold due to BRODY, restarted bumex 2 bid as per pulm, recc renal assistance re: diuresis in setting of BRODY nd close monitoring of kidney function and electrolytes  - repeat TTE to eval EF   - can restart previous meds (entresto, spirinolactone) when kidney function stabilizes    # elevated trops  - likely demand iso viral infection vs fluid overload  - trend to peak  - no active signs of ACS and ekg non ischemic    Patient follows Dr Walden cardiology and Dr Shell HF    Cathy LEONARD-BC

## 2023-12-19 NOTE — CONSULT NOTE ADULT - PROBLEM SELECTOR RECOMMENDATION 9
Bilateral lung adenocarcinoma. Reviewed recent CT showing posterior right upper lobe lobectomy, small large airways inflammation more pronounced compared to prior imaging, a nodular lesion within the apical posterior segment of the left upper lobe, appearing more nodular in configuration, measuring 3.2 x 2.0 cm.  Findings concerning for left upper lobe neoplasm.  Additional bibasilar pleural effusions decrease in extent on the right side and more pronounced mediastinal and paraesophageal lymphadenopathy.  There was a new groundglass opacity within the superior aspect of the residual RML for which short interval surveillance suggested.    Patient underwent thoracic surgery 5/1/2023; previously s/p RUL lobectomy in September 2022.  No targetable alterations, patient under oncologic surveillance.  Received RT to HARRY earlier this year.  Evaluated by pulmonary–supportive care with po steroids.  Given progression of disease on CT patient was advised to have restaging PET in ambulatory and will follow-up at [facility]. Bilateral lung adenocarcinoma. Reviewed recent CT showing posterior right upper lobe lobectomy, small large airways inflammation more pronounced compared to prior imaging, a nodular lesion within the apical posterior segment of the left upper lobe, appearing more nodular in configuration, measuring 3.2 x 2.0 cm.  Findings concerning for left upper lobe neoplasm.  Additional bibasilar pleural effusions decrease in extent on the right side and more pronounced mediastinal and paraesophageal lymphadenopathy.  There was a new groundglass opacity within the superior aspect of the residual RML for which short interval surveillance suggested.    Patient underwent thoracic surgery 5/1/2023; previously s/p RUL lobectomy in September 2022.  No targetable alterations, patient under oncologic surveillance.  Received RT to HARRY earlier this year.  Evaluated by pulmonary–supportive care with po steroids.  Given progression of disease on CT patient was advised to have restaging PET in ambulatory and will follow-up at [facility].

## 2023-12-19 NOTE — CONSULT NOTE ADULT - NS ATTEND AMEND GEN_ALL_CORE FT
I have seen and examined the patient. I have discussed case with ELDON Bowser.    John Becerra is a 78 year old man with past medical history of Multivessel Severe Coronary artery disease, Chronic systolic heart failure/HFrEF (LVEF 35% in May 2022, LVEF 15-20% in September 2023), Lung adenocarcinoma (s/p RUL lobectomy) presented with progressive dyspnea and wheezing, found to have acute respiratory failure secondary to RSV.     ECG consistent with sinus rhythm, left axis deviation and old anterior infarct, similar to prior ECG. Troponins elevated and have peaked, patient denies angina, this may be due to renal dysfunction. CT chest with consistent with left upper lobe neoplasm and new groundglass opacity of right middle lobe and the pleural effusions are decreased in size.     Prior coronary angiogram from 2022 with normal left main, 70% stenosis of LAD, 70% stenosis of first diagonal, normal LCx and 100% stenosis of RCA that was medically managed after viability testing indicated mostly infarcted tissue. Most recent echo report from July with LVEF 23%, RV dysfunction and moderate pulmonary hypertension.     Overall, symptoms do not appear to be primary systolic heart failure at this time. Follow up Pulmonary evaluation given multiple pathologic findings on CT Chest. In regards to HFrEF, resume home meds including Bumex, Metoprolol. Would hold ARB and MRA due to BRODY on CKD. Follow up echo.    We will continue to follow along.     Adam Gilliam MD  Cardiology

## 2023-12-19 NOTE — CONSULT NOTE ADULT - SUBJECTIVE AND OBJECTIVE BOX
NEPHROLOGY CONSULTATION    CHIEF COMPLAINT: SOB    HPI:  Patient is 78 year old male with a PMH of CAD, lung adenocarcinoma s/p RUL lobectomy 09/2022, CHFrEF, HTN, HLD, depression complicated by insomnia presenting to  ED w/worsening wheezing. No cough, sore throat, rhinorrhea, earache, headache, sputum production. No chest pain, palpitations, worsening LE edema, orthopnea. Noted to have hypotension, BRODY/CKD, hyponatremia. Was on ARB and Bumex as op.    ROS:  as above    Allergies:  No Known Allergies    PAST MEDICAL & SURGICAL HISTORY:  BPH (benign prostatic hyperplasia)  Bipolar disorder  Depression  Anxiety  Umbilical hernia, incarcerated  Depression  Constipation  Urinary retention  CAD (coronary artery disease)  SCC (squamous cell carcinoma)  Lung mass  Other nonspecific abnormal finding of lung field  LV (left ventricular) mural thrombus  History of inguinal hernia repair  History of CHF (congestive heart failure)  History of arthroscopy of knee  Right, 1987  History of tonsillectomy  1952  H/O coronary angiogram    SOCIAL HISTORY:  negative    FAMILY HISTORY:  depression (Mother)  CAD (coronary artery disease) (Sibling, Sibling)  diabetes mellitus (DM) (Sibling)    MEDICATIONS  (STANDING):  albuterol/ipratropium for Nebulization 3 milliLiter(s) Nebulizer every 6 hours  aspirin enteric coated 81 milliGRAM(s) Oral daily  budesonide 160 MICROgram(s)/formoterol 4.5 MICROgram(s) Inhaler 2 Puff(s) Inhalation two times a day  buMETAnide 2 milliGRAM(s) Oral daily  clopidogrel Tablet 75 milliGRAM(s) Oral daily  FLUoxetine 20 milliGRAM(s) Oral <User Schedule>  guaiFENesin ER 1200 milliGRAM(s) Oral every 12 hours  heparin   Injectable 5000 Unit(s) SubCutaneous every 8 hours  metoprolol succinate ER 25 milliGRAM(s) Oral daily  mirtazapine 7.5 milliGRAM(s) Oral at bedtime  tamsulosin 0.4 milliGRAM(s) Oral at bedtime    Home Medications:  Bumex 2 mg oral tablet: 1 tab(s) orally 2 times a day (18 Dec 2023 23:12)  losartan 25 mg oral tablet: 0.5 tab(s) orally once a day (18 Dec 2023 23:06)  PROzac 20 mg oral capsule: 1 cap(s) orally every other day (18 Dec 2023 23:07)  PROzac 40 mg oral capsule: 1 cap(s) orally every other day (18 Dec 2023 23:08)  Restoril 15 mg oral capsule: 1 cap(s) orally once a day (at bedtime) as needed for  insomnia (18 Dec 2023 23:09)    Vital Signs Last 24 Hrs  T(C): 36.4 (12-18-23 @ 20:00), Max: 36.7 (12-18-23 @ 15:16)  T(F): 97.6 (12-18-23 @ 20:00), Max: 98.1 (12-18-23 @ 15:16)  HR: 67 (12-19-23 @ 10:26) (58 - 73)  BP: 96/62 (12-18-23 @ 20:00) (87/59 - 103/68)  RR: 15 (12-18-23 @ 20:00) (12 - 20)  SpO2: 96% (12-19-23 @ 10:26) (96% - 100%)    LABS:                        11.0   4.81  )-----------( 91       ( 19 Dec 2023 07:52 )             32.7     12-19    128<L>  |  93<L>  |  101<H>  ----------------------------<  140<H>  4.4   |  22  |  2.15<H>    Ca    9.3      19 Dec 2023 07:52    TPro  7.7  /  Alb  3.1<L>  /  TBili  1.7<H>  /  DBili  x   /  AST  52<H>  /  ALT  44  /  AlkPhos  334<H>  12-19    LIVER FUNCTIONS - ( 19 Dec 2023 07:52 )  Alb: 3.1 g/dL / Pro: 7.7 g/dL / ALK PHOS: 334 U/L / ALT: 44 U/L / AST: 52 U/L / GGT: x           A/P:    full consult to follow    [phone]       NEPHROLOGY CONSULTATION    CHIEF COMPLAINT: SOB    HPI:  Patient is 78 year old male with a PMH of CAD, lung adenocarcinoma s/p RUL lobectomy 09/2022, CHFrEF, HTN, HLD, depression complicated by insomnia presenting to  ED w/worsening wheezing. No cough, sore throat, rhinorrhea, earache, headache, sputum production. No chest pain, palpitations, worsening LE edema, orthopnea. Noted to have hypotension, BRODY/CKD, hyponatremia. Was on ARB and Bumex as op.    ROS:  as above    Allergies:  No Known Allergies    PAST MEDICAL & SURGICAL HISTORY:  BPH (benign prostatic hyperplasia)  Bipolar disorder  Depression  Anxiety  Umbilical hernia, incarcerated  Depression  Constipation  Urinary retention  CAD (coronary artery disease)  SCC (squamous cell carcinoma)  Lung mass  Other nonspecific abnormal finding of lung field  LV (left ventricular) mural thrombus  History of inguinal hernia repair  History of CHF (congestive heart failure)  History of arthroscopy of knee  Right, 1987  History of tonsillectomy  1952  H/O coronary angiogram    SOCIAL HISTORY:  negative    FAMILY HISTORY:  depression (Mother)  CAD (coronary artery disease) (Sibling, Sibling)  diabetes mellitus (DM) (Sibling)    MEDICATIONS  (STANDING):  albuterol/ipratropium for Nebulization 3 milliLiter(s) Nebulizer every 6 hours  aspirin enteric coated 81 milliGRAM(s) Oral daily  budesonide 160 MICROgram(s)/formoterol 4.5 MICROgram(s) Inhaler 2 Puff(s) Inhalation two times a day  buMETAnide 2 milliGRAM(s) Oral daily  clopidogrel Tablet 75 milliGRAM(s) Oral daily  FLUoxetine 20 milliGRAM(s) Oral <User Schedule>  guaiFENesin ER 1200 milliGRAM(s) Oral every 12 hours  heparin   Injectable 5000 Unit(s) SubCutaneous every 8 hours  metoprolol succinate ER 25 milliGRAM(s) Oral daily  mirtazapine 7.5 milliGRAM(s) Oral at bedtime  tamsulosin 0.4 milliGRAM(s) Oral at bedtime    Home Medications:  Bumex 2 mg oral tablet: 1 tab(s) orally 2 times a day (18 Dec 2023 23:12)  losartan 25 mg oral tablet: 0.5 tab(s) orally once a day (18 Dec 2023 23:06)  PROzac 20 mg oral capsule: 1 cap(s) orally every other day (18 Dec 2023 23:07)  PROzac 40 mg oral capsule: 1 cap(s) orally every other day (18 Dec 2023 23:08)  Restoril 15 mg oral capsule: 1 cap(s) orally once a day (at bedtime) as needed for  insomnia (18 Dec 2023 23:09)    Vital Signs Last 24 Hrs  T(C): 36.4 (12-18-23 @ 20:00), Max: 36.7 (12-18-23 @ 15:16)  T(F): 97.6 (12-18-23 @ 20:00), Max: 98.1 (12-18-23 @ 15:16)  HR: 67 (12-19-23 @ 10:26) (58 - 73)  BP: 96/62 (12-18-23 @ 20:00) (87/59 - 103/68)  RR: 15 (12-18-23 @ 20:00) (12 - 20)  SpO2: 96% (12-19-23 @ 10:26) (96% - 100%)    LABS:                        11.0   4.81  )-----------( 91       ( 19 Dec 2023 07:52 )             32.7     12-19    128<L>  |  93<L>  |  101<H>  ----------------------------<  140<H>  4.4   |  22  |  2.15<H>    Ca    9.3      19 Dec 2023 07:52    TPro  7.7  /  Alb  3.1<L>  /  TBili  1.7<H>  /  DBili  x   /  AST  52<H>  /  ALT  44  /  AlkPhos  334<H>  12-19    LIVER FUNCTIONS - ( 19 Dec 2023 07:52 )  Alb: 3.1 g/dL / Pro: 7.7 g/dL / ALK PHOS: 334 U/L / ALT: 44 U/L / AST: 52 U/L / GGT: x           A/P:    full consult to follow    [phone]       NEPHROLOGY CONSULTATION    CHIEF COMPLAINT: SOB    HPI:  Patient is 78 year old male with a PMH of CAD, lung adenocarcinoma s/p RUL lobectomy 09/2022, CHFrEF, HTN, HLD, depression complicated by insomnia presenting to  ED w/worsening wheezing. No cough, sore throat, rhinorrhea, earache, headache, sputum production. No chest pain, palpitations, worsening LE edema, orthopnea. Noted to have hypotension, BRODY/CKD, hyponatremia. Was on ARB and Bumex as op. Seen in ED.     ROS:  as above    Allergies:  No Known Allergies    PAST MEDICAL & SURGICAL HISTORY:  BPH (benign prostatic hyperplasia)  Bipolar disorder  Depression  Anxiety  Umbilical hernia, incarcerated  Depression  Constipation  Urinary retention  CAD (coronary artery disease)  SCC (squamous cell carcinoma)  Lung mass  Other nonspecific abnormal finding of lung field  LV (left ventricular) mural thrombus  History of inguinal hernia repair  History of CHF (congestive heart failure)  History of arthroscopy of knee  Right, 1987  History of tonsillectomy  1952  H/O coronary angiogram    SOCIAL HISTORY:  negative    FAMILY HISTORY:  depression (Mother)  CAD (coronary artery disease) (Sibling, Sibling)  diabetes mellitus (DM) (Sibling)    MEDICATIONS  (STANDING):  albuterol/ipratropium for Nebulization 3 milliLiter(s) Nebulizer every 6 hours  aspirin enteric coated 81 milliGRAM(s) Oral daily  budesonide 160 MICROgram(s)/formoterol 4.5 MICROgram(s) Inhaler 2 Puff(s) Inhalation two times a day  buMETAnide 2 milliGRAM(s) Oral daily  clopidogrel Tablet 75 milliGRAM(s) Oral daily  FLUoxetine 20 milliGRAM(s) Oral <User Schedule>  guaiFENesin ER 1200 milliGRAM(s) Oral every 12 hours  heparin   Injectable 5000 Unit(s) SubCutaneous every 8 hours  metoprolol succinate ER 25 milliGRAM(s) Oral daily  mirtazapine 7.5 milliGRAM(s) Oral at bedtime  tamsulosin 0.4 milliGRAM(s) Oral at bedtime    Home Medications:  Bumex 2 mg oral tablet: 1 tab(s) orally 2 times a day (18 Dec 2023 23:12)  losartan 25 mg oral tablet: 0.5 tab(s) orally once a day (18 Dec 2023 23:06)  PROzac 20 mg oral capsule: 1 cap(s) orally every other day (18 Dec 2023 23:07)  PROzac 40 mg oral capsule: 1 cap(s) orally every other day (18 Dec 2023 23:08)  Restoril 15 mg oral capsule: 1 cap(s) orally once a day (at bedtime) as needed for  insomnia (18 Dec 2023 23:09)    Vital Signs Last 24 Hrs  T(C): 36.4 (12-18-23 @ 20:00), Max: 36.7 (12-18-23 @ 15:16)  T(F): 97.6 (12-18-23 @ 20:00), Max: 98.1 (12-18-23 @ 15:16)  HR: 67 (12-19-23 @ 10:26) (58 - 73)  BP: 96/62 (12-18-23 @ 20:00) (87/59 - 103/68)  RR: 15 (12-18-23 @ 20:00) (12 - 20)  SpO2: 96% (12-19-23 @ 10:26) (96% - 100%)    s1s2  b/l air entry  soft, ND   no edema    LABS:                        11.0   4.81  )-----------( 91       ( 19 Dec 2023 07:52 )             32.7     12-19    128<L>  |  93<L>  |  101<H>  ----------------------------<  140<H>  4.4   |  22  |  2.15<H>    Ca    9.3      19 Dec 2023 07:52    TPro  7.7  /  Alb  3.1<L>  /  TBili  1.7<H>  /  DBili  x   /  AST  52<H>  /  ALT  44  /  AlkPhos  334<H>  12-19    LIVER FUNCTIONS - ( 19 Dec 2023 07:52 )  Alb: 3.1 g/dL / Pro: 7.7 g/dL / ALK PHOS: 334 U/L / ALT: 44 U/L / AST: 52 U/L / GGT: x           A/P:    CM, EF 20-25%, mod MR, TR  Cardio-renal/hemodynamic BRODY/CKD   Hyponatremia iso BRODY, malignancy   UA negative  SONO w/o hydro   Possibly recurrence of lung Ca  Pt is not a candidate for ACE/ARB/Entresto at this time  Continue Bumex for now  BMP in am  D/w family at bedside     [phone]       NEPHROLOGY CONSULTATION    CHIEF COMPLAINT: SOB    HPI:  Patient is 78 year old male with a PMH of CAD, lung adenocarcinoma s/p RUL lobectomy 09/2022, CHFrEF, HTN, HLD, depression complicated by insomnia presenting to  ED w/worsening wheezing. No cough, sore throat, rhinorrhea, earache, headache, sputum production. No chest pain, palpitations, worsening LE edema, orthopnea. Noted to have hypotension, BRODY/CKD, hyponatremia. Was on ARB and Bumex as op. Seen in ED.     ROS:  as above    Allergies:  No Known Allergies    PAST MEDICAL & SURGICAL HISTORY:  BPH (benign prostatic hyperplasia)  Bipolar disorder  Depression  Anxiety  Umbilical hernia, incarcerated  Depression  Constipation  Urinary retention  CAD (coronary artery disease)  SCC (squamous cell carcinoma)  Lung mass  Other nonspecific abnormal finding of lung field  LV (left ventricular) mural thrombus  History of inguinal hernia repair  History of CHF (congestive heart failure)  History of arthroscopy of knee  Right, 1987  History of tonsillectomy  1952  H/O coronary angiogram    SOCIAL HISTORY:  negative    FAMILY HISTORY:  depression (Mother)  CAD (coronary artery disease) (Sibling, Sibling)  diabetes mellitus (DM) (Sibling)    MEDICATIONS  (STANDING):  albuterol/ipratropium for Nebulization 3 milliLiter(s) Nebulizer every 6 hours  aspirin enteric coated 81 milliGRAM(s) Oral daily  budesonide 160 MICROgram(s)/formoterol 4.5 MICROgram(s) Inhaler 2 Puff(s) Inhalation two times a day  buMETAnide 2 milliGRAM(s) Oral daily  clopidogrel Tablet 75 milliGRAM(s) Oral daily  FLUoxetine 20 milliGRAM(s) Oral <User Schedule>  guaiFENesin ER 1200 milliGRAM(s) Oral every 12 hours  heparin   Injectable 5000 Unit(s) SubCutaneous every 8 hours  metoprolol succinate ER 25 milliGRAM(s) Oral daily  mirtazapine 7.5 milliGRAM(s) Oral at bedtime  tamsulosin 0.4 milliGRAM(s) Oral at bedtime    Home Medications:  Bumex 2 mg oral tablet: 1 tab(s) orally 2 times a day (18 Dec 2023 23:12)  losartan 25 mg oral tablet: 0.5 tab(s) orally once a day (18 Dec 2023 23:06)  PROzac 20 mg oral capsule: 1 cap(s) orally every other day (18 Dec 2023 23:07)  PROzac 40 mg oral capsule: 1 cap(s) orally every other day (18 Dec 2023 23:08)  Restoril 15 mg oral capsule: 1 cap(s) orally once a day (at bedtime) as needed for  insomnia (18 Dec 2023 23:09)    Vital Signs Last 24 Hrs  T(C): 36.4 (12-18-23 @ 20:00), Max: 36.7 (12-18-23 @ 15:16)  T(F): 97.6 (12-18-23 @ 20:00), Max: 98.1 (12-18-23 @ 15:16)  HR: 67 (12-19-23 @ 10:26) (58 - 73)  BP: 96/62 (12-18-23 @ 20:00) (87/59 - 103/68)  RR: 15 (12-18-23 @ 20:00) (12 - 20)  SpO2: 96% (12-19-23 @ 10:26) (96% - 100%)    s1s2  b/l air entry  soft, ND   no edema    LABS:                        11.0   4.81  )-----------( 91       ( 19 Dec 2023 07:52 )             32.7     12-19    128<L>  |  93<L>  |  101<H>  ----------------------------<  140<H>  4.4   |  22  |  2.15<H>    Ca    9.3      19 Dec 2023 07:52    TPro  7.7  /  Alb  3.1<L>  /  TBili  1.7<H>  /  DBili  x   /  AST  52<H>  /  ALT  44  /  AlkPhos  334<H>  12-19    LIVER FUNCTIONS - ( 19 Dec 2023 07:52 )  Alb: 3.1 g/dL / Pro: 7.7 g/dL / ALK PHOS: 334 U/L / ALT: 44 U/L / AST: 52 U/L / GGT: x           A/P:    CM, EF 20-25%, mod MR, TR  Cardio-renal/hemodynamic BRODY/CKD   Hyponatremia iso BRODY, malignancy   UA negative  SONO w/o hydro   Possibly recurrence of lung Ca  Pt is not a candidate for ACE/ARB/Entresto at this time  Continue Bumex for now  BMP in am  D/w family at bedside     [phone]

## 2023-12-19 NOTE — PROGRESS NOTE ADULT - ASSESSMENT
78 year old male with a PMHX of CAD, lung adenocarcinoma s/p RUL lobectomy 09/2022, CHFrEF, HTN, HLD, depression complicated by insomnia presenting to  ED for 4 days of worsening wheezing being admitted for severe bronchitis 2/2 to RSV infection and course complicated by BRODY and hyponatremia     #Acute bronchitis 2/2 acute RSV infection  - CT Chest significant for new onset small large airways inflammation  - audible wheezing with rales and rhonchi on ascultation  - Also with new right middle lobe ground glass opacity concerning for PNA vs malignancy  - Cover for CAP pna with ceft and azithro  - IV Solumedrol 40mg daily  - duonebs and Mucinex supportive care  - Pulmonary consult requested    # CAP PNA  Patient with new right middle lobe ground glass opacities  possible superimposed bacterial PNA  treat for CAP with ceft/azitho.    #Troponinemia  - Trop 95.9>79.7->81.4  - likely 2/2 to demand ischemia in setting of viral infection  -no active chest pain    #BRODY  -Cr 2.29 (1.4 02/2023)   -likely pre-renal from dehydration given >BUN/Cr  -avoid nephrotoxic agents (holding Losartan)  -hold diuretics for now  -renal US to eval hydronephrosis   -renal consult - awaiting recs     #Hyponatremia   -check urine Na and osm to eval for SIADH  -monitor Na for now  -renal consulted - awaiting recs    #CHFrEF  - unlikely to be acute exacerbation of HF; patient appears clinically dry  - no changes in weight, no cough, no orthopnea  - patient takes Bumex 2mg BID and spironolactone will hold as patient currently in ARF and appears dry  - TTE requested for AM  - cardiology consult requested    #Lung Adenocarcinoma  - RUL Lobectomy 09/2022 s/p radiation tx  - HARRY now concerning for neoplasm with mediastinal/paraesophageal lymphadenopathy more pronounced  - Hem/Onc consult requested. Likely will need outpatient follow up.     #CAD  -asa and plavix    #DVT PPx   -HSQ 78 year old male with a PMHX of CAD, lung adenocarcinoma s/p RUL lobectomy 09/2022, CHFrEF, HTN, HLD, depression complicated by insomnia presenting to  ED for 4 days of worsening wheezing being admitted for severe bronchitis 2/2 to RSV infection and course complicated by BRODY and hyponatremia     #Acute bronchitis 2/2 acute RSV infection  - CT Chest significant for new onset small large airways inflammation  - audible wheezing with rales and rhonchi on ascultation  - Also with new right middle lobe ground glass opacity concerning for PNA vs malignancy. Given no fevers and leukocytosis, less likely bacterial etiology hence will stop antibiotics  -transition from IV steroids to prednisone 40 daily  - duonebs and Mucinex supportive care  - Pulmonary consult appreciated      #Troponinemia  - Trop 95.9>79.7->81.4  - likely 2/2 to demand ischemia in setting of viral infection  -no active chest pain    #BRODY  -Cr 2.29 (1.4 02/2023)   -likely pre-renal from dehydration given >BUN/Cr  -avoid nephrotoxic agents (holding Losartan)  -renal US to eval hydronephrosis   -renal consult - awaiting recs     #Hyponatremia   -check urine Na and osm to eval for SIADH  -monitor Na for now  -renal consulted - awaiting recs    #CHFrEF  - unlikely to be acute exacerbation of HF; patient appears clinically dry  - no changes in weight, no cough, no orthopnea  - patient takes Bumex 2mg BID and spironolactone at home. Restarted bumex per pulm recs.   - TTE requested for AM  - cardiology consult requested    #Lung Adenocarcinoma  - RUL Lobectomy 09/2022 s/p radiation tx  - HARRY now concerning for neoplasm with mediastinal/paraesophageal lymphadenopathy more pronounced  - Hem/Onc consult requested. Likely will need outpatient follow up.     #CAD  -asa and plavix    #DVT PPx   -HSQ

## 2023-12-19 NOTE — PROGRESS NOTE ADULT - SUBJECTIVE AND OBJECTIVE BOX
Dr. Slater [facility] Medicine  Progress Note    Patient is a 78y old  Male who presents with a chief complaint of Wheezing (18 Dec 2023 23:13)    SUBJECTIVE / OVERNIGHT EVENTS: Patient seen and examined. Denies any cough today. Still wheezing. No chest pain.     MEDICATIONS  (STANDING):  albuterol/ipratropium for Nebulization 3 milliLiter(s) Nebulizer every 6 hours  aspirin enteric coated 81 milliGRAM(s) Oral daily  azithromycin  IVPB      azithromycin  IVPB 500 milliGRAM(s) IV Intermittent every 24 hours  budesonide 160 MICROgram(s)/formoterol 4.5 MICROgram(s) Inhaler 2 Puff(s) Inhalation two times a day  clopidogrel Tablet 75 milliGRAM(s) Oral daily  FLUoxetine 20 milliGRAM(s) Oral <User Schedule>  guaiFENesin ER 1200 milliGRAM(s) Oral every 12 hours  methylPREDNISolone sodium succinate Injectable 40 milliGRAM(s) IV Push daily  metoprolol succinate ER 25 milliGRAM(s) Oral daily  mirtazapine 7.5 milliGRAM(s) Oral at bedtime  tamsulosin 0.4 milliGRAM(s) Oral at bedtime    MEDICATIONS  (PRN):  acetaminophen     Tablet .. 650 milliGRAM(s) Oral every 6 hours PRN Temp greater or equal to 38C (100.4F), Mild Pain (1 - 3)  aluminum hydroxide/magnesium hydroxide/simethicone Suspension 30 milliLiter(s) Oral every 4 hours PRN Dyspepsia  melatonin 3 milliGRAM(s) Oral at bedtime PRN Insomnia  ondansetron Injectable 4 milliGRAM(s) IV Push every 8 hours PRN Nausea and/or Vomiting  temazepam 15 milliGRAM(s) Oral at bedtime PRN Insomnia      Vital Signs Last 24 Hrs  T(C): 36.4 (18 Dec 2023 20:00), Max: 36.7 (18 Dec 2023 15:16)  T(F): 97.6 (18 Dec 2023 20:00), Max: 98.1 (18 Dec 2023 15:16)  HR: 73 (18 Dec 2023 20:00) (58 - 73)  BP: 96/62 (18 Dec 2023 20:00) (87/59 - 103/68)  BP(mean): --  RR: 15 (18 Dec 2023 20:00) (12 - 20)  SpO2: 98% (18 Dec 2023 20:00) (97% - 100%)    Parameters below as of 18 Dec 2023 20:00  Patient On (Oxygen Delivery Method): room air    Physical Exam: Constitutional: Pt lying in bed, awake and alert, NAD  HEENT: EOMI, normal hearing, moist mucous membranes  Neck: Soft and supple, +JVD  Respiratory: +diffuse rhonci across all lung fields +mild crackles bibasilar   Cardiovascular: S1S2+, RRR, no M/G/R  Gastrointestinal: BS+, soft, NT/ND, no guarding, no rebound  Extremities: +1 pitting edema   Vascular: 2+ peripheral pulses  Neurological: AAOx3, no focal deficits  Musculoskeletal: 5/5 strength b/l upper and lower extremities  Skin: No rashes    LABS:                        11.0   4.81  )-----------( 91       ( 19 Dec 2023 07:52 )             32.7     12-19    128<L>  |  93<L>  |  101<H>  ----------------------------<  140<H>  4.4   |  22  |  2.15<H>    Ca    9.3      19 Dec 2023 07:52    TPro  7.7  /  Alb  3.1<L>  /  TBili  1.7<H>  /  DBili  x   /  AST  52<H>  /  ALT  44  /  AlkPhos  334<H>  12-19      Urinalysis Basic - ( 19 Dec 2023 07:52 )    Color: x / Appearance: x / SG: x / pH: x  Gluc: 140 mg/dL / Ketone: x  / Bili: x / Urobili: x   Blood: x / Protein: x / Nitrite: x   Leuk Esterase: x / RBC: x / WBC x   Sq Epi: x / Non Sq Epi: x / Bacteria: x        RADIOLOGY & ADDITIONAL TESTS:    Imaging Personally Reviewed:    Consultant(s) Notes Reviewed:      Care Discussed with Consultants/Other Providers:    Assessment and Plan: Dr. Slater [facility] Medicine  Progress Note    Patient is a 78y old  Male who presents with a chief complaint of Wheezing (18 Dec 2023 23:13)    SUBJECTIVE / OVERNIGHT EVENTS: Patient seen and examined. Denies any cough today. Still wheezing. No chest pain.     MEDICATIONS  (STANDING):  albuterol/ipratropium for Nebulization 3 milliLiter(s) Nebulizer every 6 hours  aspirin enteric coated 81 milliGRAM(s) Oral daily  azithromycin  IVPB      azithromycin  IVPB 500 milliGRAM(s) IV Intermittent every 24 hours  budesonide 160 MICROgram(s)/formoterol 4.5 MICROgram(s) Inhaler 2 Puff(s) Inhalation two times a day  clopidogrel Tablet 75 milliGRAM(s) Oral daily  FLUoxetine 20 milliGRAM(s) Oral <User Schedule>  guaiFENesin ER 1200 milliGRAM(s) Oral every 12 hours  methylPREDNISolone sodium succinate Injectable 40 milliGRAM(s) IV Push daily  metoprolol succinate ER 25 milliGRAM(s) Oral daily  mirtazapine 7.5 milliGRAM(s) Oral at bedtime  tamsulosin 0.4 milliGRAM(s) Oral at bedtime    MEDICATIONS  (PRN):  acetaminophen     Tablet .. 650 milliGRAM(s) Oral every 6 hours PRN Temp greater or equal to 38C (100.4F), Mild Pain (1 - 3)  aluminum hydroxide/magnesium hydroxide/simethicone Suspension 30 milliLiter(s) Oral every 4 hours PRN Dyspepsia  melatonin 3 milliGRAM(s) Oral at bedtime PRN Insomnia  ondansetron Injectable 4 milliGRAM(s) IV Push every 8 hours PRN Nausea and/or Vomiting  temazepam 15 milliGRAM(s) Oral at bedtime PRN Insomnia      Vital Signs Last 24 Hrs  T(C): 36.4 (18 Dec 2023 20:00), Max: 36.7 (18 Dec 2023 15:16)  T(F): 97.6 (18 Dec 2023 20:00), Max: 98.1 (18 Dec 2023 15:16)  HR: 73 (18 Dec 2023 20:00) (58 - 73)  BP: 96/62 (18 Dec 2023 20:00) (87/59 - 103/68)  BP(mean): --  RR: 15 (18 Dec 2023 20:00) (12 - 20)  SpO2: 98% (18 Dec 2023 20:00) (97% - 100%)    Parameters below as of 18 Dec 2023 20:00  Patient On (Oxygen Delivery Method): room air    Physical Exam: Constitutional: Pt lying in bed, awake and alert, NAD  HEENT: EOMI, normal hearing, moist mucous membranes  Neck: Soft and supple, +JVD  Respiratory: +diffuse rhonci across all lung fields +mild crackles bibasilar   Cardiovascular: S1S2+, RRR, no M/G/R  Gastrointestinal: BS+, soft, NT/ND, no guarding, no rebound  Extremities: +1 pitting edema   Vascular: 2+ peripheral pulses  Neurological: AAOx3, no focal deficits  Musculoskeletal: 5/5 strength b/l upper and lower extremities  Skin: No rashes    LABS:                        11.0   4.81  )-----------( 91       ( 19 Dec 2023 07:52 )             32.7     12-19    128<L>  |  93<L>  |  101<H>  ----------------------------<  140<H>  4.4   |  22  |  2.15<H>    Ca    9.3      19 Dec 2023 07:52    TPro  7.7  /  Alb  3.1<L>  /  TBili  1.7<H>  /  DBili  x   /  AST  52<H>  /  ALT  44  /  AlkPhos  334<H>  12-19      Urinalysis Basic - ( 19 Dec 2023 07:52 )    Color: x / Appearance: x / SG: x / pH: x  Gluc: 140 mg/dL / Ketone: x  / Bili: x / Urobili: x   Blood: x / Protein: x / Nitrite: x   Leuk Esterase: x / RBC: x / WBC x   Sq Epi: x / Non Sq Epi: x / Bacteria: x        RADIOLOGY & ADDITIONAL TESTS:    Imaging Personally Reviewed:    Consultant(s) Notes Reviewed:      Care Discussed with Consultants/Other Providers:    Assessment and Plan:

## 2023-12-19 NOTE — CONSULT NOTE ADULT - ASSESSMENT
Assessment  Acute Respiratory distress due to RSV    as per history patient wheezing and no longer wheezing  left upper lobe Lung cancer s/p radiation, increasing in size on CT  Chronic Systolic CHF    Underlying BPH, Bipolar d/o, Depression, Anxiety, CAD (coronary artery disease)      Plan  N/c o2 to maintain sat  Supportive care for RSV  on IV steroids, will change to prednisone daily  Nebs as needed  never smoker doubt copd, albuterol PRN   doubt pneumonia, consider to stop abx  restart home bumex  will need out patient PET scan again to f/u lesion, patient had one 8/8/23 but CT scan showing slight increase in size   Assessment  Acute Respiratory distress due to RSV    as per history patient wheezing and no longer wheezing  left upper lobe Lung cancer s/p radiation, increasing in size on CT  Chronic Systolic CHF    Underlying BPH, Bipolar d/o, Depression, Anxiety, CAD (coronary artery disease)      Plan  N/c o2 to maintain sat  Supportive care for RSV  on IV steroids, will change to prednisone daily  Nebs as needed  never smoker doubt copd, albuterol PRN   doubt pneumonia, consider to stop abx  restart home bumex  will need out patient PET scan again to f/u lesion, patient had one 8/8/23 but CT scan showing slight increase in size    consider heme onc f/u as out patient   Wife called [phone]- called msg left for call back    Assessment  Acute Respiratory distress due to RSV    as per history patient wheezing and no longer wheezing  left upper lobe Lung cancer s/p radiation, increasing in size on CT  Chronic Systolic CHF    Underlying BPH, Bipolar d/o, Depression, Anxiety, CAD (coronary artery disease)      Plan  N/c o2 to maintain sat  Supportive care for RSV  on IV steroids, will change to prednisone daily  Nebs as needed  never smoker doubt copd, albuterol PRN   doubt pneumonia, consider to stop abx  restart home bumex  will need out patient PET scan again to f/u lesion, patient had one 8/8/23 but CT scan showing slight increase in size    consider heme onc f/u as out patient   Wife called [phone]- called msg left for call back

## 2023-12-19 NOTE — PHYSICAL THERAPY INITIAL EVALUATION ADULT - DIAGNOSIS, PT EVAL
Patient: Ilana Shin    Procedure: Procedure(s):  Ablation Focal Atrial Fibrillation       Diagnosis: afib  Diagnosis Additional Information: No value filed.    Anesthesia Type:   General     Note:    Oropharynx: oropharynx clear of all foreign objects and spontaneously breathing  Level of Consciousness: awake  Oxygen Supplementation: face mask  Level of Supplemental Oxygen (L/min / FiO2): 6  Independent Airway: airway patency satisfactory and stable  Dentition: dentition unchanged  Vital Signs Stable: post-procedure vital signs reviewed and stable  Report to RN Given: handoff report given  Patient transferred to: PACU    Handoff Report: Identifed the Patient, Identified the Reponsible Provider, Reviewed the pertinent medical history, Discussed the surgical course, Reviewed Intra-OP anesthesia mangement and issues during anesthesia, Set expectations for post-procedure period and Allowed opportunity for questions and acknowledgement of understanding      Vitals:  Vitals Value Taken Time   /73 03/24/22 1445   Temp     Pulse 94 03/24/22 1446   Resp 41 03/24/22 1446   SpO2 100 % 03/24/22 1446   Vitals shown include unvalidated device data.    Electronically Signed By: MAURO Pacheco CRNA  March 24, 2022  2:47 PM  
weakness

## 2023-12-19 NOTE — PHYSICAL THERAPY INITIAL EVALUATION ADULT - ADDITIONAL COMMENTS
Pt lives with wife in private home 3 [address] flight of steps to study, independent with ambulation and ADLs.

## 2023-12-19 NOTE — CONSULT NOTE ADULT - SUBJECTIVE AND OBJECTIVE BOX
PULMONARY CONSULT  Location of Patient : [facility] 19 ( ER)  Attending requesting Consult:Nohemy Alejandre  Chief Complaint :  SOB  Reason For consult : Sob      Initial HPI on admission:  HPI:  78 year old male with a PMHX of CAD, lung adenocarcinoma s/p RUL lobectomy 09/2022, Sever Systolic CHF, HTN, HLD, depression complicated by insomnia presenting to  ED for 4 days of worsening wheezing.   He reports he went out in the cold without a jacket last week and subsequently developed a cold followed by audible wheezing. He reports no cough, sore throat, rhinorrhea, earache, headache, sputum production. Patient also reports he has been monitoring his daily weights and has been 181 lbs which is his baseline. He denies any chest pain, palpitations, worsening LE edema, orthopnea.     In the ED patient's temp was 98.1F, HR was 58, BP was 87/59. His troponin notably was 95.9>79.7. He is saturating well on RA.  (18 Dec 2023 23:13)      BRIEF HOSPITAL COURSE: ***    PAST MEDICAL & SURGICAL HISTORY:  BPH (benign prostatic hyperplasia)  Bipolar disorder  Depression  Anxiety  Umbilical hernia, incarcerated  Depression  Constipation  Urinary retention  CAD (coronary artery disease)  SCC (squamous cell carcinoma)  Lung mass  Other nonspecific abnormal finding of lung field  LV (left ventricular) mural thrombus  History of inguinal hernia  History of CHF (congestive heart failure)  History of arthroscopy of knee Right, 1987  History of tonsillectomy 1952  History of hernia repair  H/O coronary angiogram    Allergies    No Known Allergies    Intolerances      FAMILY HISTORY:  Family history of depression (Mother)    FH: CAD (coronary artery disease) (Sibling, Sibling)    Family history of diabetes mellitus (DM) (Sibling)      Social history:        Smoking:     Drinking:     Drug use:    Review of Systems: as stated above    CONSTITUTIONAL: No fever, No chills, No fatigue  EYES: No eye pain, No visual disturbances, No discharge  ENMT:  No difficulty hearing, No tinnitus, No vertigo; No sinus or throat pain  NECK: No pain, No stiffness  RESPIRATORY: No Cough, No SOB, No Secretions  CARDIOVASCULAR: No chest pain, No palpitations, No dizziness, or No leg swelling  GASTROINTESTINAL: No abdominal or epigastric pain. No nausea, No vomiting, No hematemesis; No diarrhea, No constipation. No melena, No hematochezia.  GENITOURINARY: No dysuria, No frequency, No hematuria, No incontinence  NEUROLOGICAL: No headaches, No memory loss, No loss of strength, No numbness, No tremors  SKIN: No itching, No burning, No rashes, No lesions   MUSCULOSKELETAL: No joint pain or swelling; No muscle, back, No extremity pain  PSYCHIATRIC: No depression, No anxiety, No mood swings, No difficulty sleeping      Medications:  MEDICATIONS  (STANDING):  albuterol/ipratropium for Nebulization 3 milliLiter(s) Nebulizer every 6 hours  aspirin enteric coated 81 milliGRAM(s) Oral daily  azithromycin  IVPB      azithromycin  IVPB 500 milliGRAM(s) IV Intermittent every 24 hours  budesonide 160 MICROgram(s)/formoterol 4.5 MICROgram(s) Inhaler 2 Puff(s) Inhalation two times a day  cefTRIAXone   IVPB 1000 milliGRAM(s) IV Intermittent every 24 hours  clopidogrel Tablet 75 milliGRAM(s) Oral daily  FLUoxetine 20 milliGRAM(s) Oral <User Schedule>  guaiFENesin ER 1200 milliGRAM(s) Oral every 12 hours  heparin   Injectable 5000 Unit(s) SubCutaneous every 8 hours  methylPREDNISolone sodium succinate Injectable 40 milliGRAM(s) IV Push daily  metoprolol succinate ER 25 milliGRAM(s) Oral daily  mirtazapine 7.5 milliGRAM(s) Oral at bedtime  tamsulosin 0.4 milliGRAM(s) Oral at bedtime    MEDICATIONS  (PRN):  acetaminophen     Tablet .. 650 milliGRAM(s) Oral every 6 hours PRN Temp greater or equal to 38C (100.4F), Mild Pain (1 - 3)  aluminum hydroxide/magnesium hydroxide/simethicone Suspension 30 milliLiter(s) Oral every 4 hours PRN Dyspepsia  melatonin 3 milliGRAM(s) Oral at bedtime PRN Insomnia  ondansetron Injectable 4 milliGRAM(s) IV Push every 8 hours PRN Nausea and/or Vomiting  temazepam 15 milliGRAM(s) Oral at bedtime PRN Insomnia      Antibiotics History  azithromycin  IVPB 500 milliGRAM(s) IV Intermittent once, 12-18-23 @ 23:48  azithromycin  IVPB    , 12-18-23 @ 23:48  azithromycin  IVPB 500 milliGRAM(s) IV Intermittent every 24 hours, 12-19-23 @ 23:48  cefTRIAXone   IVPB 1000 milliGRAM(s) IV Intermittent every 24 hours, 12-19-23 @ 10:16, Stop order after: 5 Days      Heme Medications   aspirin enteric coated 81 milliGRAM(s) Oral daily, 12-18-23 @ 23:03  clopidogrel Tablet 75 milliGRAM(s) Oral daily, 12-18-23 @ 23:05  heparin   Injectable 5000 Unit(s) SubCutaneous every 8 hours, 12-19-23 @ 10:24      GI Medications  aluminum hydroxide/magnesium hydroxide/simethicone Suspension 30 milliLiter(s) Oral every 4 hours, 12-18-23 @ 23:44, Routine PRN        Home Medications:  Last Order Reconciliation Date: 12-18-23 @ 23:38 (Admission Reconciliation)  Aldactone 25 mg oral tablet: 0.5 tab(s) orally once a day  (10-20-23 @ 20:30)  aspirin 81 mg oral capsule: 1 cap(s) orally once a day  (10-20-23 @ 20:30)  Bumex 2 mg oral tablet: 1 tab(s) orally 2 times a day (12-18-23 @ 23:12)  Flomax 0.4 mg oral capsule: 1 cap(s) orally once a day (01-10-23 @ 13:39)  losartan 25 mg oral tablet: 0.5 tab(s) orally once a day (12-18-23 @ 23:06)  Metoprolol Succinate ER 25 mg oral tablet, extended release: 1 tab(s) orally every 12 hours (01-10-23 @ 13:39)  mirtazapine 15 mg oral tablet: 2 tab(s) orally once a day (at bedtime)  (01-10-23 @ 13:39)  Plavix 75 mg oral tablet: 1 tab(s) orally once a day  (01-10-23 @ 13:39)  PROzac 20 mg oral capsule: 1 cap(s) orally every other day (12-18-23 @ 23:07)  PROzac 40 mg oral capsule: 1 cap(s) orally every other day (12-18-23 @ 23:08)  Restoril 15 mg oral capsule: 1 cap(s) orally once a day (at bedtime) as needed for  insomnia (12-18-23 @ 23:09)  rosuvastatin 20 mg oral tablet: 1 tab(s) orally once a day (at bedtime) (01-10-23 @ 13:39)      LABS:                        11.0   4.81  )-----------( 91       ( 19 Dec 2023 07:52 )             32.7     12-19    128<L>  |  93<L>  |  101<H>  ----------------------------<  140<H>  4.4   |  22  |  2.15<H>    Ca    9.3      19 Dec 2023 07:52    TPro  7.7  /  Alb  3.1<L>  /  TBili  1.7<H>  /  DBili  x   /  AST  52<H>  /  ALT  44  /  AlkPhos  334<H>  12-19              Urinalysis Basic - ( 19 Dec 2023 07:52 )    Color: x / Appearance: x / SG: x / pH: x  Gluc: 140 mg/dL / Ketone: x  / Bili: x / Urobili: x   Blood: x / Protein: x / Nitrite: x   Leuk Esterase: x / RBC: x / WBC x   Sq Epi: x / Non Sq Epi: x / Bacteria: x              CULTURES: (if applicable)    Rapid RVP Result: Detected (12-18-23 @ 15:55)        CAPILLARY BLOOD GLUCOSE          RADIOLOGY  CXR:      CT:    ECHO:      VITALS:  T(C): 36.4 (12-18-23 @ 20:00), Max: 36.7 (12-18-23 @ 15:16)  T(F): 97.6 (12-18-23 @ 20:00), Max: 98.1 (12-18-23 @ 15:16)  HR: 67 (12-19-23 @ 10:26) (58 - 73)  BP: 96/62 (12-18-23 @ 20:00) (87/59 - 103/68)  BP(mean): --  ABP: --  ABP(mean): --  RR: 15 (12-18-23 @ 20:00) (12 - 20)  SpO2: 96% (12-19-23 @ 10:26) (96% - 100%)  CVP(mm Hg): --  CVP(cm H2O): --    Ins and Outs       Height (cm): 182.9 (12-18-23 @ 15:16)  Weight (kg): 82.1 (12-18-23 @ 15:16)  BMI (kg/m2): 24.5 (12-18-23 @ 15:16)        I&O's Detail      Physical Examination:  GENERAL:               Alert, Oriented, No acute distress.    HEENT:                    Pupils equal, reactive to light.  Symmetric. No JVD, Moist MM  PULM:                     Bilateral air entry, Clear to auscultation bilaterally, no significant sputum production, No Rales, No Rhonchi, No Wheezing  CVS:                         S1, S2,  No Murmur  ABD:                        Soft, nondistended, nontender, normoactive bowel sounds,   EXT:                         No edema, nontender, No Cyanosis or Clubbing   Vascular:                Warm Extremities, Normal Capillary refill, Normal Distal Pulses  SKIN:                       Warm and well perfused, no rashes noted.   NEURO:                  Alert, oriented, interactive, nonfocal, follows commands  PSYC:                      Calm, + Insight.     PULMONARY CONSULT  Location of Patient : [facility] 19 ( ER)  Attending requesting Consult:Nohemy Alejandre  Chief Complaint :  SOB  Reason For consult : Sob      Initial HPI on admission:  HPI:  78 year old male with a PMHX of CAD, lung adenocarcinoma s/p RUL lobectomy 09/2022, Sever Systolic CHF, HTN, HLD, depression complicated by insomnia presenting to  ED for 4 days of worsening wheezing.   He reports he went out in the cold without a jacket last week and subsequently developed a cold followed by audible wheezing. He reports no cough, sore throat, rhinorrhea, earache, headache, sputum production. Patient also reports he has been monitoring his daily weights and has been 181 lbs which is his baseline. He denies any chest pain, palpitations, worsening LE edema, orthopnea.     In the ED patient's temp was 98.1F, HR was 58, BP was 87/59. His troponin notably was 95.9>79.7. He is saturating well on RA.  (18 Dec 2023 23:13)      BRIEF HOSPITAL COURSE: ***    PAST MEDICAL & SURGICAL HISTORY:  BPH (benign prostatic hyperplasia)  Bipolar disorder  Depression  Anxiety  Umbilical hernia, incarcerated  Depression  Constipation  Urinary retention  CAD (coronary artery disease)  SCC (squamous cell carcinoma)  Lung mass  Other nonspecific abnormal finding of lung field  LV (left ventricular) mural thrombus  History of inguinal hernia  History of CHF (congestive heart failure)  History of arthroscopy of knee Right, 1987  History of tonsillectomy 1952  History of hernia repair  H/O coronary angiogram    Allergies    No Known Allergies    Intolerances      FAMILY HISTORY:  Family history of depression (Mother)    FH: CAD (coronary artery disease) (Sibling, Sibling)    Family history of diabetes mellitus (DM) (Sibling)      Social history:        Smoking:     Drinking:     Drug use:    Review of Systems: as stated above    CONSTITUTIONAL: No fever, No chills, No fatigue  EYES: No eye pain, No visual disturbances, No discharge  ENMT:  No difficulty hearing, No tinnitus, No vertigo; No sinus or throat pain  NECK: No pain, No stiffness  RESPIRATORY: No Cough, No SOB, No Secretions  CARDIOVASCULAR: No chest pain, No palpitations, No dizziness, or No leg swelling  GASTROINTESTINAL: No abdominal or epigastric pain. No nausea, No vomiting, No hematemesis; No diarrhea, No constipation. No melena, No hematochezia.  GENITOURINARY: No dysuria, No frequency, No hematuria, No incontinence  NEUROLOGICAL: No headaches, No memory loss, No loss of strength, No numbness, No tremors  SKIN: No itching, No burning, No rashes, No lesions   MUSCULOSKELETAL: No joint pain or swelling; No muscle, back, No extremity pain  PSYCHIATRIC: No depression, No anxiety, No mood swings, No difficulty sleeping      Medications:  MEDICATIONS  (STANDING):  albuterol/ipratropium for Nebulization 3 milliLiter(s) Nebulizer every 6 hours  aspirin enteric coated 81 milliGRAM(s) Oral daily  azithromycin  IVPB      azithromycin  IVPB 500 milliGRAM(s) IV Intermittent every 24 hours  budesonide 160 MICROgram(s)/formoterol 4.5 MICROgram(s) Inhaler 2 Puff(s) Inhalation two times a day  cefTRIAXone   IVPB 1000 milliGRAM(s) IV Intermittent every 24 hours  clopidogrel Tablet 75 milliGRAM(s) Oral daily  FLUoxetine 20 milliGRAM(s) Oral <User Schedule>  guaiFENesin ER 1200 milliGRAM(s) Oral every 12 hours  heparin   Injectable 5000 Unit(s) SubCutaneous every 8 hours  methylPREDNISolone sodium succinate Injectable 40 milliGRAM(s) IV Push daily  metoprolol succinate ER 25 milliGRAM(s) Oral daily  mirtazapine 7.5 milliGRAM(s) Oral at bedtime  tamsulosin 0.4 milliGRAM(s) Oral at bedtime    MEDICATIONS  (PRN):  acetaminophen     Tablet .. 650 milliGRAM(s) Oral every 6 hours PRN Temp greater or equal to 38C (100.4F), Mild Pain (1 - 3)  aluminum hydroxide/magnesium hydroxide/simethicone Suspension 30 milliLiter(s) Oral every 4 hours PRN Dyspepsia  melatonin 3 milliGRAM(s) Oral at bedtime PRN Insomnia  ondansetron Injectable 4 milliGRAM(s) IV Push every 8 hours PRN Nausea and/or Vomiting  temazepam 15 milliGRAM(s) Oral at bedtime PRN Insomnia      Antibiotics History  azithromycin  IVPB 500 milliGRAM(s) IV Intermittent once, 12-18-23 @ 23:48  azithromycin  IVPB    , 12-18-23 @ 23:48  azithromycin  IVPB 500 milliGRAM(s) IV Intermittent every 24 hours, 12-19-23 @ 23:48  cefTRIAXone   IVPB 1000 milliGRAM(s) IV Intermittent every 24 hours, 12-19-23 @ 10:16, Stop order after: 5 Days      Heme Medications   aspirin enteric coated 81 milliGRAM(s) Oral daily, 12-18-23 @ 23:03  clopidogrel Tablet 75 milliGRAM(s) Oral daily, 12-18-23 @ 23:05  heparin   Injectable 5000 Unit(s) SubCutaneous every 8 hours, 12-19-23 @ 10:24      GI Medications  aluminum hydroxide/magnesium hydroxide/simethicone Suspension 30 milliLiter(s) Oral every 4 hours, 12-18-23 @ 23:44, Routine PRN        Home Medications:  Last Order Reconciliation Date: 12-18-23 @ 23:38 (Admission Reconciliation)  Aldactone 25 mg oral tablet: 0.5 tab(s) orally once a day  (10-20-23 @ 20:30)  aspirin 81 mg oral capsule: 1 cap(s) orally once a day  (10-20-23 @ 20:30)  Bumex 2 mg oral tablet: 1 tab(s) orally 2 times a day (12-18-23 @ 23:12)  Flomax 0.4 mg oral capsule: 1 cap(s) orally once a day (01-10-23 @ 13:39)  losartan 25 mg oral tablet: 0.5 tab(s) orally once a day (12-18-23 @ 23:06)  Metoprolol Succinate ER 25 mg oral tablet, extended release: 1 tab(s) orally every 12 hours (01-10-23 @ 13:39)  mirtazapine 15 mg oral tablet: 2 tab(s) orally once a day (at bedtime)  (01-10-23 @ 13:39)  Plavix 75 mg oral tablet: 1 tab(s) orally once a day  (01-10-23 @ 13:39)  PROzac 20 mg oral capsule: 1 cap(s) orally every other day (12-18-23 @ 23:07)  PROzac 40 mg oral capsule: 1 cap(s) orally every other day (12-18-23 @ 23:08)  Restoril 15 mg oral capsule: 1 cap(s) orally once a day (at bedtime) as needed for  insomnia (12-18-23 @ 23:09)  rosuvastatin 20 mg oral tablet: 1 tab(s) orally once a day (at bedtime) (01-10-23 @ 13:39)      LABS:                        11.0   4.81  )-----------( 91       ( 19 Dec 2023 07:52 )             32.7     12-19    128<L>  |  93<L>  |  101<H>  ----------------------------<  140<H>  4.4   |  22  |  2.15<H>    Ca    9.3      19 Dec 2023 07:52    TPro  7.7  /  Alb  3.1<L>  /  TBili  1.7<H>  /  DBili  x   /  AST  52<H>  /  ALT  44  /  AlkPhos  334<H>  12-19              Urinalysis Basic - ( 19 Dec 2023 07:52 )    Color: x / Appearance: x / SG: x / pH: x  Gluc: 140 mg/dL / Ketone: x  / Bili: x / Urobili: x   Blood: x / Protein: x / Nitrite: x   Leuk Esterase: x / RBC: x / WBC x   Sq Epi: x / Non Sq Epi: x / Bacteria: x              CULTURES: (if applicable)    Rapid RVP Result: Detected (12-18-23 @ 15:55)        CAPILLARY BLOOD GLUCOSE          RADIOLOGY  CXR:      CT:    ECHO:      VITALS:  T(C): 36.4 (12-18-23 @ 20:00), Max: 36.7 (12-18-23 @ 15:16)  T(F): 97.6 (12-18-23 @ 20:00), Max: 98.1 (12-18-23 @ 15:16)  HR: 67 (12-19-23 @ 10:26) (58 - 73)  BP: 96/62 (12-18-23 @ 20:00) (87/59 - 103/68)  BP(mean): --  ABP: --  ABP(mean): --  RR: 15 (12-18-23 @ 20:00) (12 - 20)  SpO2: 96% (12-19-23 @ 10:26) (96% - 100%)  CVP(mm Hg): --  CVP(cm H2O): --    Ins and Outs       Height (cm): 182.9 (12-18-23 @ 15:16)  Weight (kg): 82.1 (12-18-23 @ 15:16)  BMI (kg/m2): 24.5 (12-18-23 @ 15:16)        I&O's Detail      Physical Examination:  GENERAL:               Alert, Oriented, No acute distress.    HEENT:                    Pupils equal, reactive to light.  Symmetric. No JVD, Moist MM  PULM:                     Bilateral air entry, Clear to auscultation bilaterally, no significant sputum production, No Rales, No Rhonchi, No Wheezing  CVS:                         S1, S2,  No Murmur  ABD:                        Soft, nondistended, nontender, normoactive bowel sounds,   EXT:                         No edema, nontender, No Cyanosis or Clubbing   Vascular:                Warm Extremities, Normal Capillary refill, Normal Distal Pulses  SKIN:                       Warm and well perfused, no rashes noted.   NEURO:                  Alert, oriented, interactive, nonfocal, follows commands  PSYC:                      Calm, + Insight.     PULMONARY CONSULT  Location of Patient : [facility] 19 ( ER)  Attending requesting Consult: Nohemy Alejandre  Chief Complaint :  SOB  Reason For consult : Sob      Initial HPI on admission:  HPI:  78 year old male with a PMHX of CAD, lung adenocarcinoma s/p RUL lobectomy 09/2022, Sever Systolic CHF, HTN, HLD, depression complicated by insomnia presenting to  ED for 4 days of worsening wheezing.   He reports he went out in the cold without a jacket last week and subsequently developed a cold followed by audible wheezing. He reports no cough, sore throat, rhinorrhea, earache, headache, sputum production. Patient also reports he has been monitoring his daily weights and has been 181 lbs which is his baseline. He denies any chest pain, palpitations, worsening LE edema, orthopnea.     In the ED patient's temp was 98.1F, HR was 58, BP was 87/59. His troponin notably was 95.9>79.7. He is saturating well on RA.  (18 Dec 2023 23:13)      Patient admited to medical floor  seen in ED  states has had sob, and rales  no cp, palp, n/v, denies new LE edema      PAST MEDICAL & SURGICAL HISTORY:  BPH (benign prostatic hyperplasia)  Bipolar disorder  Depression  Anxiety  Umbilical hernia, incarcerated  Depression  Constipation  Urinary retention  CAD (coronary artery disease)  SCC (squamous cell carcinoma)  Lung mass  Other nonspecific abnormal finding of lung field  LV (left ventricular) mural thrombus  History of inguinal hernia  History of CHF (congestive heart failure)  History of arthroscopy of knee Right, 1987  History of tonsillectomy 1952  History of hernia repair  H/O coronary angiogram    Allergies    No Known Allergies    Intolerances      FAMILY HISTORY:  Family history of depression (Mother)    FH: CAD (coronary artery disease) (Sibling, Sibling)    Family history of diabetes mellitus (DM) (Sibling)      Social history:      Smoking: never smoker    Review of Systems: as stated above    CONSTITUTIONAL: No fever, No chills, +fatigue  EYES: No eye pain, No visual disturbances, No discharge  ENMT:  No difficulty hearing, No tinnitus, No vertigo; No sinus or throat pain  NECK: No pain, No stiffness  RESPIRATORY: No Cough, +SOB, No Secretions  CARDIOVASCULAR: No chest pain, No palpitations, No dizziness, or No leg swelling  GASTROINTESTINAL: No abdominal or epigastric pain. No nausea, No vomiting, No hematemesis; No diarrhea, No constipation. No melena, No hematochezia.  GENITOURINARY: No dysuria, No frequency, No hematuria, No incontinence  NEUROLOGICAL: No headaches, No memory loss, No loss of strength, No numbness, No tremors  SKIN: No itching, No burning, No rashes, No lesions   MUSCULOSKELETAL: No joint pain or swelling; No muscle, back, No extremity pain  PSYCHIATRIC: No depression, No anxiety, No mood swings, No difficulty sleeping      Medications:  MEDICATIONS  (STANDING):  albuterol/ipratropium for Nebulization 3 milliLiter(s) Nebulizer every 6 hours  aspirin enteric coated 81 milliGRAM(s) Oral daily  azithromycin  IVPB      azithromycin  IVPB 500 milliGRAM(s) IV Intermittent every 24 hours  budesonide 160 MICROgram(s)/formoterol 4.5 MICROgram(s) Inhaler 2 Puff(s) Inhalation two times a day  cefTRIAXone   IVPB 1000 milliGRAM(s) IV Intermittent every 24 hours  clopidogrel Tablet 75 milliGRAM(s) Oral daily  FLUoxetine 20 milliGRAM(s) Oral <User Schedule>  guaiFENesin ER 1200 milliGRAM(s) Oral every 12 hours  heparin   Injectable 5000 Unit(s) SubCutaneous every 8 hours  methylPREDNISolone sodium succinate Injectable 40 milliGRAM(s) IV Push daily  metoprolol succinate ER 25 milliGRAM(s) Oral daily  mirtazapine 7.5 milliGRAM(s) Oral at bedtime  tamsulosin 0.4 milliGRAM(s) Oral at bedtime    MEDICATIONS  (PRN):  acetaminophen     Tablet .. 650 milliGRAM(s) Oral every 6 hours PRN Temp greater or equal to 38C (100.4F), Mild Pain (1 - 3)  aluminum hydroxide/magnesium hydroxide/simethicone Suspension 30 milliLiter(s) Oral every 4 hours PRN Dyspepsia  melatonin 3 milliGRAM(s) Oral at bedtime PRN Insomnia  ondansetron Injectable 4 milliGRAM(s) IV Push every 8 hours PRN Nausea and/or Vomiting  temazepam 15 milliGRAM(s) Oral at bedtime PRN Insomnia      Antibiotics History  azithromycin  IVPB 500 milliGRAM(s) IV Intermittent once, 12-18-23 @ 23:48  azithromycin  IVPB    , 12-18-23 @ 23:48  azithromycin  IVPB 500 milliGRAM(s) IV Intermittent every 24 hours, 12-19-23 @ 23:48  cefTRIAXone   IVPB 1000 milliGRAM(s) IV Intermittent every 24 hours, 12-19-23 @ 10:16, Stop order after: 5 Days      Heme Medications   aspirin enteric coated 81 milliGRAM(s) Oral daily, 12-18-23 @ 23:03  clopidogrel Tablet 75 milliGRAM(s) Oral daily, 12-18-23 @ 23:05  heparin   Injectable 5000 Unit(s) SubCutaneous every 8 hours, 12-19-23 @ 10:24      GI Medications  aluminum hydroxide/magnesium hydroxide/simethicone Suspension 30 milliLiter(s) Oral every 4 hours, 12-18-23 @ 23:44, Routine PRN        Home Medications:  Last Order Reconciliation Date: 12-18-23 @ 23:38 (Admission Reconciliation)  Aldactone 25 mg oral tablet: 0.5 tab(s) orally once a day  (10-20-23 @ 20:30)  aspirin 81 mg oral capsule: 1 cap(s) orally once a day  (10-20-23 @ 20:30)  Bumex 2 mg oral tablet: 1 tab(s) orally 2 times a day (12-18-23 @ 23:12)  Flomax 0.4 mg oral capsule: 1 cap(s) orally once a day (01-10-23 @ 13:39)  losartan 25 mg oral tablet: 0.5 tab(s) orally once a day (12-18-23 @ 23:06)  Metoprolol Succinate ER 25 mg oral tablet, extended release: 1 tab(s) orally every 12 hours (01-10-23 @ 13:39)  mirtazapine 15 mg oral tablet: 2 tab(s) orally once a day (at bedtime)  (01-10-23 @ 13:39)  Plavix 75 mg oral tablet: 1 tab(s) orally once a day  (01-10-23 @ 13:39)  PROzac 20 mg oral capsule: 1 cap(s) orally every other day (12-18-23 @ 23:07)  PROzac 40 mg oral capsule: 1 cap(s) orally every other day (12-18-23 @ 23:08)  Restoril 15 mg oral capsule: 1 cap(s) orally once a day (at bedtime) as needed for  insomnia (12-18-23 @ 23:09)  rosuvastatin 20 mg oral tablet: 1 tab(s) orally once a day (at bedtime) (01-10-23 @ 13:39)      LABS:                        11.0   4.81  )-----------( 91       ( 19 Dec 2023 07:52 )             32.7     12-19    128<L>  |  93<L>  |  101<H>  ----------------------------<  140<H>  4.4   |  22  |  2.15<H>    Ca    9.3      19 Dec 2023 07:52    TPro  7.7  /  Alb  3.1<L>  /  TBili  1.7<H>  /  DBili  x   /  AST  52<H>  /  ALT  44  /  AlkPhos  334<H>  12-19      Resp Syncytial Virus (RapRVP): Detected (12.18.23 @ 15:55)     RADIOLOGY  CXR:  Mild vascular congestion b/l    CT:  < from: CT Chest No Cont (12.18.23 @ 18:44) >  IMPRESSION:    1. Post right upper lobe lobectomy. No evidence of local disease recurrence.  2. Findings compatible with small large airways inflammation more pronounced when compared to prior imaging.  3. A nodular lesion within the apical posterior segment of the left upper lobe which appears more nodular configuration measuring 32 x 20 mm (333:142). Findings concerning for left upper lobe neoplasm and   correlation with whole-body PET/CT is suggested.  4. Bibasilar pleural effusions which have decreased slightly in extent on the right side.  5. Mediastinal as well as paraesophageal lymphadenopath slightly more pronounced.  6. New groundglass opacity within the superior aspect of the residual right middle lobe. Short interval surveillance in 3-6 months is suggested.  --- End of Report ---  < end of copied text >    ECHO:    PET  < from: NM PET/CT Onc FDG Skull to Thigh, Subsq (08.08.23 @ 18:00) >  IMPRESSION:    Redemonstration of a left upper lobe lobulated lung nodule that is stable   in size (anatomically) and has slightly decreased metabolically (FDG   uptake). Continued follow-up as clinically indicated.    --- End of Report ---      < end of copied text >    VITALS:  T(C): 36.4 (12-18-23 @ 20:00), Max: 36.7 (12-18-23 @ 15:16)  T(F): 97.6 (12-18-23 @ 20:00), Max: 98.1 (12-18-23 @ 15:16)  HR: 67 (12-19-23 @ 10:26) (58 - 73)  BP: 96/62 (12-18-23 @ 20:00) (87/59 - 103/68)  BP(mean): --  ABP: --  ABP(mean): --  RR: 15 (12-18-23 @ 20:00) (12 - 20)  SpO2: 96% (12-19-23 @ 10:26) (96% - 100%)  CVP(mm Hg): --  CVP(cm H2O): --    Ins and Outs       Height (cm): 182.9 (12-18-23 @ 15:16)  Weight (kg): 82.1 (12-18-23 @ 15:16)  BMI (kg/m2): 24.5 (12-18-23 @ 15:16)        I&O's Detail      Physical Examination:  GENERAL:               Alert, Oriented, No acute distress.    HEENT:                 No JVD, Moist MM + upper airway rales  PULM:                     Bilateral air entry, Clear to auscultation bilaterally with transmitted upper airway sounds, no significant sputum production, + Rales, No Rhonchi, No Wheezing  CVS:                         S1, S2,  + Murmur  ABD:                        Soft, nondistended, nontender, normoactive bowel sounds,   EXT:                         + edema, nontender, No Cyanosis or Clubbing   Vascular:                Warm Extremities, Normal Capillary refill, Normal Distal Pulses  SKIN:                       Warm and well perfused, no rashes noted.   NEURO:                  Alert, oriented, interactive, nonfocal, follows commands  PSYC:                      Calm, + Insight.     PULMONARY CONSULT  Location of Patient : [facility] ( ER)  Attending requesting Consult: Nohemy Alejandre  Chief Complaint :  SOB  Reason For consult : Sob      Initial HPI on admission:  HPI:  78 year old male with a PMHX of CAD, lung adenocarcinoma s/p RUL lobectomy 09/2022, Sever Systolic CHF, HTN, HLD, depression complicated by insomnia presenting to  ED for 4 days of worsening wheezing.   He reports he went out in the cold without a jacket last week and subsequently developed a cold followed by audible wheezing. He reports no cough, sore throat, rhinorrhea, earache, headache, sputum production. Patient also reports he has been monitoring his daily weights and has been 181 lbs which is his baseline. He denies any chest pain, palpitations, worsening LE edema, orthopnea.     In the ED patient's temp was 98.1F, HR was 58, BP was 87/59. His troponin notably was 95.9>79.7. He is saturating well on RA.  (18 Dec 2023 23:13)      Patient admited to medical floor  seen in ED  states has had sob, and rales  no cp, palp, n/v, denies new LE edema      PAST MEDICAL & SURGICAL HISTORY:  BPH (benign prostatic hyperplasia)  Bipolar disorder  Depression  Anxiety  Umbilical hernia, incarcerated  Depression  Constipation  Urinary retention  CAD (coronary artery disease)  SCC (squamous cell carcinoma)  Lung mass  Other nonspecific abnormal finding of lung field  LV (left ventricular) mural thrombus  History of inguinal hernia  History of CHF (congestive heart failure)  History of arthroscopy of knee Right, 1987  History of tonsillectomy 1952  History of hernia repair  H/O coronary angiogram    Allergies    No Known Allergies    Intolerances      FAMILY HISTORY:  Family history of depression (Mother)    FH: CAD (coronary artery disease) (Sibling, Sibling)    Family history of diabetes mellitus (DM) (Sibling)      Social history:      Smoking: never smoker    Review of Systems: as stated above    CONSTITUTIONAL: No fever, No chills, +fatigue  EYES: No eye pain, No visual disturbances, No discharge  ENMT:  No difficulty hearing, No tinnitus, No vertigo; No sinus or throat pain  NECK: No pain, No stiffness  RESPIRATORY: No Cough, +SOB, No Secretions  CARDIOVASCULAR: No chest pain, No palpitations, No dizziness, or No leg swelling  GASTROINTESTINAL: No abdominal or epigastric pain. No nausea, No vomiting, No hematemesis; No diarrhea, No constipation. No melena, No hematochezia.  GENITOURINARY: No dysuria, No frequency, No hematuria, No incontinence  NEUROLOGICAL: No headaches, No memory loss, No loss of strength, No numbness, No tremors  SKIN: No itching, No burning, No rashes, No lesions   MUSCULOSKELETAL: No joint pain or swelling; No muscle, back, No extremity pain  PSYCHIATRIC: No depression, No anxiety, No mood swings, No difficulty sleeping      Medications:  MEDICATIONS  (STANDING):  albuterol/ipratropium for Nebulization 3 milliLiter(s) Nebulizer every 6 hours  aspirin enteric coated 81 milliGRAM(s) Oral daily  azithromycin  IVPB      azithromycin  IVPB 500 milliGRAM(s) IV Intermittent every 24 hours  budesonide 160 MICROgram(s)/formoterol 4.5 MICROgram(s) Inhaler 2 Puff(s) Inhalation two times a day  cefTRIAXone   IVPB 1000 milliGRAM(s) IV Intermittent every 24 hours  clopidogrel Tablet 75 milliGRAM(s) Oral daily  FLUoxetine 20 milliGRAM(s) Oral <User Schedule>  guaiFENesin ER 1200 milliGRAM(s) Oral every 12 hours  heparin   Injectable 5000 Unit(s) SubCutaneous every 8 hours  methylPREDNISolone sodium succinate Injectable 40 milliGRAM(s) IV Push daily  metoprolol succinate ER 25 milliGRAM(s) Oral daily  mirtazapine 7.5 milliGRAM(s) Oral at bedtime  tamsulosin 0.4 milliGRAM(s) Oral at bedtime    MEDICATIONS  (PRN):  acetaminophen     Tablet .. 650 milliGRAM(s) Oral every 6 hours PRN Temp greater or equal to 38C (100.4F), Mild Pain (1 - 3)  aluminum hydroxide/magnesium hydroxide/simethicone Suspension 30 milliLiter(s) Oral every 4 hours PRN Dyspepsia  melatonin 3 milliGRAM(s) Oral at bedtime PRN Insomnia  ondansetron Injectable 4 milliGRAM(s) IV Push every 8 hours PRN Nausea and/or Vomiting  temazepam 15 milliGRAM(s) Oral at bedtime PRN Insomnia      Antibiotics History  azithromycin  IVPB 500 milliGRAM(s) IV Intermittent once, 12-18-23 @ 23:48  azithromycin  IVPB    , 12-18-23 @ 23:48  azithromycin  IVPB 500 milliGRAM(s) IV Intermittent every 24 hours, 12-19-23 @ 23:48  cefTRIAXone   IVPB 1000 milliGRAM(s) IV Intermittent every 24 hours, 12-19-23 @ 10:16, Stop order after: 5 Days      Heme Medications   aspirin enteric coated 81 milliGRAM(s) Oral daily, 12-18-23 @ 23:03  clopidogrel Tablet 75 milliGRAM(s) Oral daily, 12-18-23 @ 23:05  heparin   Injectable 5000 Unit(s) SubCutaneous every 8 hours, 12-19-23 @ 10:24      GI Medications  aluminum hydroxide/magnesium hydroxide/simethicone Suspension 30 milliLiter(s) Oral every 4 hours, 12-18-23 @ 23:44, Routine PRN        Home Medications:  Last Order Reconciliation Date: 12-18-23 @ 23:38 (Admission Reconciliation)  Aldactone 25 mg oral tablet: 0.5 tab(s) orally once a day  (10-20-23 @ 20:30)  aspirin 81 mg oral capsule: 1 cap(s) orally once a day  (10-20-23 @ 20:30)  Bumex 2 mg oral tablet: 1 tab(s) orally 2 times a day (12-18-23 @ 23:12)  Flomax 0.4 mg oral capsule: 1 cap(s) orally once a day (01-10-23 @ 13:39)  losartan 25 mg oral tablet: 0.5 tab(s) orally once a day (12-18-23 @ 23:06)  Metoprolol Succinate ER 25 mg oral tablet, extended release: 1 tab(s) orally every 12 hours (01-10-23 @ 13:39)  mirtazapine 15 mg oral tablet: 2 tab(s) orally once a day (at bedtime)  (01-10-23 @ 13:39)  Plavix 75 mg oral tablet: 1 tab(s) orally once a day  (01-10-23 @ 13:39)  PROzac 20 mg oral capsule: 1 cap(s) orally every other day (12-18-23 @ 23:07)  PROzac 40 mg oral capsule: 1 cap(s) orally every other day (12-18-23 @ 23:08)  Restoril 15 mg oral capsule: 1 cap(s) orally once a day (at bedtime) as needed for  insomnia (12-18-23 @ 23:09)  rosuvastatin 20 mg oral tablet: 1 tab(s) orally once a day (at bedtime) (01-10-23 @ 13:39)      LABS:                        11.0   4.81  )-----------( 91       ( 19 Dec 2023 07:52 )             32.7     12-19    128<L>  |  93<L>  |  101<H>  ----------------------------<  140<H>  4.4   |  22  |  2.15<H>    Ca    9.3      19 Dec 2023 07:52    TPro  7.7  /  Alb  3.1<L>  /  TBili  1.7<H>  /  DBili  x   /  AST  52<H>  /  ALT  44  /  AlkPhos  334<H>  12-19      Resp Syncytial Virus (RapRVP): Detected (12.18.23 @ 15:55)     RADIOLOGY  CXR:  Mild vascular congestion b/l    CT:  < from: CT Chest No Cont (12.18.23 @ 18:44) >  IMPRESSION:    1. Post right upper lobe lobectomy. No evidence of local disease recurrence.  2. Findings compatible with small large airways inflammation more pronounced when compared to prior imaging.  3. A nodular lesion within the apical posterior segment of the left upper lobe which appears more nodular configuration measuring 32 x 20 mm (333:142). Findings concerning for left upper lobe neoplasm and   correlation with whole-body PET/CT is suggested.  4. Bibasilar pleural effusions which have decreased slightly in extent on the right side.  5. Mediastinal as well as paraesophageal lymphadenopath slightly more pronounced.  6. New groundglass opacity within the superior aspect of the residual right middle lobe. Short interval surveillance in 3-6 months is suggested.  --- End of Report ---  < end of copied text >    ECHO:    PET  < from: NM PET/CT Onc FDG Skull to Thigh, Subsq (08.08.23 @ 18:00) >  IMPRESSION:    Redemonstration of a left upper lobe lobulated lung nodule that is stable   in size (anatomically) and has slightly decreased metabolically (FDG   uptake). Continued follow-up as clinically indicated.    --- End of Report ---      < end of copied text >    VITALS:  T(C): 36.4 (12-18-23 @ 20:00), Max: 36.7 (12-18-23 @ 15:16)  T(F): 97.6 (12-18-23 @ 20:00), Max: 98.1 (12-18-23 @ 15:16)  HR: 67 (12-19-23 @ 10:26) (58 - 73)  BP: 96/62 (12-18-23 @ 20:00) (87/59 - 103/68)  BP(mean): --  ABP: --  ABP(mean): --  RR: 15 (12-18-23 @ 20:00) (12 - 20)  SpO2: 96% (12-19-23 @ 10:26) (96% - 100%)  CVP(mm Hg): --  CVP(cm H2O): --    Ins and Outs       Height (cm): 182.9 (12-18-23 @ 15:16)  Weight (kg): 82.1 (12-18-23 @ 15:16)  BMI (kg/m2): 24.5 (12-18-23 @ 15:16)        I&O's Detail      Physical Examination:  GENERAL:               Alert, Oriented, No acute distress.    HEENT:                 No JVD, Moist MM + upper airway rales  PULM:                     Bilateral air entry, Clear to auscultation bilaterally with transmitted upper airway sounds, no significant sputum production, + Rales, No Rhonchi, No Wheezing  CVS:                         S1, S2,  + Murmur  ABD:                        Soft, nondistended, nontender, normoactive bowel sounds,   EXT:                         + edema, nontender, No Cyanosis or Clubbing   Vascular:                Warm Extremities, Normal Capillary refill, Normal Distal Pulses  SKIN:                       Warm and well perfused, no rashes noted.   NEURO:                  Alert, oriented, interactive, nonfocal, follows commands  PSYC:                      Calm, + Insight.

## 2023-12-19 NOTE — CONSULT NOTE ADULT - SUBJECTIVE AND OBJECTIVE BOX
ELYSE MALAVE,  78y Male  MRN: [id number]  ATTENDING: Dr. Nohemy Alejandre      HPI:  78M, never smoker PMHx HTN, HLD C HFrEF, CAD, lung adenocarcinoma, s/p RUL lobectomy September 2022, depression, admitted to [facility] with increasing dyspnea and wheezing x 4 days.  Denies associated sore throat, postnasal drip, cough or sputum production.  In ED was found mildly anemic and thrombocytopenic.  Patient's initial diagnosis was in July 2022, when he presented with a right upper lung mass, stage Ib; pathology positive for adenocarcinoma PD-L1 0% ALK negative.  Underwent right VATS right upper lobectomy on 9/23/2022.  Patient received 5000 cGy to left lung in March of this year, after restaging PET from 1/25/2023 showed a hypermetabolic HARRY opacity highly suspicious for malignancy.  Today's CT chest consistent with posterior right upper lobe lobectomy, small large airways inflammation more pronounced compared to prior imaging, a nodular lesion within the apical posterior segment of the left upper lobe, appearing more nodular in configuration, measuring 3.2 x 2.0 cm.  Findings concerning for left upper lobe neoplasm.  Additional bibasilar pleural effusions decrease in extent on the right side and more pronounced mediastinal and paraesophageal lymphadenopathy.  There was a new groundglass opacity within the superior aspect of the residual RML for which short interval surveillance suggested.  Oncology consulted as above.    PAST MEDICAL & SURGICAL HISTORY:  BPH (benign prostatic hyperplasia)  Bipolar disorder  Depression  Anxiety  Umbilical hernia, incarcerated  Depression  Constipation  Urinary retention  CAD (coronary artery disease)  SCC (squamous cell carcinoma)  Lung mass  Other nonspecific abnormal finding of lung field  LV (left ventricular) mural thrombus  History of inguinal hernia  History of CHF (congestive heart failure)  History of arthroscopy of knee-Right, 1987  History of tonsillectomy-1952  History of hernia repair  H/O coronary angiogram    MEDICATION:  albuterol/ipratropium for Nebulization 3 milliLiter(s) Nebulizer every 6 hours  aspirin enteric coated 81 milliGRAM(s) Oral daily  budesonide 160 MICROgram(s)/formoterol 4.5 MICROgram(s) Inhaler 2 Puff(s) Inhalation two times a day  buMETAnide 2 milliGRAM(s) Oral daily  clopidogrel Tablet 75 milliGRAM(s) Oral daily  FLUoxetine 20 milliGRAM(s) Oral <User Schedule>  guaiFENesin ER 1200 milliGRAM(s) Oral every 12 hours  heparin   Injectable 5000 Unit(s) SubCutaneous every 8 hours  metoprolol succinate ER 25 milliGRAM(s) Oral daily  mirtazapine 7.5 milliGRAM(s) Oral at bedtime  tamsulosin 0.4 milliGRAM(s) Oral at bedtime    ALLERGIES:  No Known Allergies    FAMILY HISTORY:  Reviewed, non-contributory: [ x]     SOCIAL HISTORY:  Tobacco: YES [ ]  ; NO [x ]; Former smoker [ ]  Alcohol:   YES [ ]  ; NO [ x]; Social alcohol user [ ]    REVIEW SYSTEMS:  Constitutional: no fever, chills, night sweats, no weight loss  HEENT: denies visual changes; no oral ulcers, dysphagia, no epistaxis;   Respiratory: no dyspnea , wheezing, cough, hemoptysis  Cardiovascular: denies acute chest pain, palpitations  GI: no loss of appetite, dark stools, or abdominal tenderness / pain; no change in bowel habits.  Musculoskeletal: no new back pain, bone/ joint pain ,no extremity swelling  Integumentary: denies pruritus; no skin rash, bruises, no  suspicious skin lesions  Neurologic: denies peripheral numbness, no dizziness, no gait problems.  Heme: no reported easy bruisability; no lymph node enlargement    VITALS:  T(C): 36.4, Max: 36.4 (12-18-23 @ 20:00)  T(F): 97.6, Max: 97.6 (12-18-23 @ 20:00)  HR: 67 (60 - 73)  BP: 96/62 (96/62 - 103/68)  SpO2: 96% (96% - 99%)    PHYSICAL EXAM:  Constitutional: alert, well developed  HEENT: normocephalic, anicteric sclerae, no mucositis or thrush  Respiratory: bilateral clear to auscultation anteriorly  Cardiovascular : S1, S2 regular, rhythmic, no murmurs, gallops or rubs  Abdomen: soft, distended, + normoactive BS, no palpable HS- megaly  Extremities: no tenderness;  -c/c/e, pulses equal bilaterally  Integumentary: no rashes, scars, or lesions suggestive of malignancy  Neurologic: no gross focal deficits    LABS:  (12-19) WBC: 4.81 K/uL,Hemoglobin: 11.0 g/dL, Hematocrit: 32.7 %,  Platelet: 91 K/uL  (12-19) Na: 128 mmol/L ; K: 4.4 mmol/L ; BUN: 101 mg/dL ; Cr: 2.15 mg/dL.    RADIOLOGY:  ACC: [id number] EXAM:  CT CHEST   ORDERED BY:  RAJI GUTHRIE     PROCEDURE DATE:  12/18/2023          INTERPRETATION:  CLINICAL INFORMATION: Torus of breath 78-year-old male   with shortness of breath    COMPARISON: This radiograph 12/18/2023 and CT chest 9/27/2023   CONTRAST/COMPLICATIONS:  IV Contrast: NONE  Oral Contrast: NONE  Complications: None reported at time of study completion    PROCEDURE:  CT of the Chest was performed.  Sagittal and coronal reformats were performed.    FINDINGS:    LUNGS AND AIRWAYS: Central airways are patent. Post right upper lobe   lobectomy with stable regional postsurgical change. Confluent   centrilobular emphysema is noted most pronounced within the right upper   lobe. There is bronchial wall thickening with areas of mucous plugging   bilaterally consistent with large airways disease. Additional numerous   tree-in-bud micronodules are noted compatible with small airways   inflammation. These findings are more pronounced when compared to prior   imaging.  There may be some minimal interlobular septal thickening to   suggest pulmonary vascular congestion. There is a new again noted is a   linear and nodular opacity within the apical posterior segment left upper   lobe 14 x 16 mm a groundglass nodule within the superior aspect of the   residual right middle lobe (303:101). Again noted is a linear nodular   opacity within the apical posterior segment of the left upper lobe which   appears more rounded in configuration measuring approximately 20 x 32 mm   (it measured 32 x 16 mm on prior imaging). There may be some increased   adjacent interlobular septal thickening concerning for lymphangitic   spread of disease.  PLEURA: There are bibasilar pleural effusions trace on the left side and   mild on the right. Has been an interval decrease in the size of the right   pleural effusion when compared to prior imaging.  Slightly more pronounced when compared to prior imaging. MEDIASTINUM AND   MICHELLE: There are increased number of top normal size mediastinal lymph   nodes the largest in the subcarinal region measuring 12 mm in short axis   (301:55) multiple stable periesophageal lymph nodes which are mildly   enlarged measuring up to 11 mm in short axis (301:80 and 3 1:113)  VESSELS: Mild calcified aortic mural plaque most pronounced in the aortic   arch descending and proximal abdominal aorta and major side branches.   There is no evidence of aortic aneurysm. Main pulmonary artery is upper   limits of normal caliber unchanged from prior imaging measuring 2.2 cm.   Underlying mild pulmonary hypertension cannot be excluded.  HEART: Cardiomegaly with multiple chamber enlargement. Small pericardial   effusion unchanged. Severe coronary artery calcification/stents.  CHEST WALL AND LOWER NECK: Moderate symmetric gynecomastia.  VISUALIZED UPPER ABDOMEN: Stable, too small to characterize right hepatic   dome low-attenuation structure measuring 10 mm. Cholelithiasis   redemonstrated. Thickening of the limbs of the adrenal glands bilaterally   consistent with adrenal gland hyperplasia unchanged. Small type I hiatal   hernia.  BONES: Moderate multilevel degenerative disc disease of the midthoracic   spine. No aggressive osseous lesions. Degenerative changes of both   glenohumeral joints    IMPRESSION:    1. Post right upper lobe lobectomy. No evidence of local disease   recurrence.  2. Findings compatible with small large airways inflammation more   pronounced when compared to prior imaging.  3. A nodular lesion within the apical posterior segment of the left upper   lobe which appears more nodular configuration measuring 32 x 20 mm   (333:142). Findings concerning for left upper lobe neoplasm and   correlation with whole-body PET/CT is suggested.  4. Bibasilar pleural effusions which have decreased slightly in extent on   the right side.  5. Mediastinal as well as paraesophageal lymphadenopath slightly more   pronounced.  6. New groundglass opacity within the superior aspect of the residual   right middle lobe. Short interval surveillance in 3-6 months is suggested.                 ELYSE MALAVE,  78y Male  MRN: [id number]  ATTENDING: Dr. Nohemy Alejandre      HPI:  78M, never smoker PMHx HTN, HLD C HFrEF, CAD, lung adenocarcinoma, s/p RUL lobectomy September 2022, depression, admitted to [facility] with increasing dyspnea and wheezing x 4 days.  Denies associated sore throat, postnasal drip, cough or sputum production.  In ED was found mildly anemic and thrombocytopenic.  Patient's initial diagnosis was in July 2022, when he presented with a right upper lung mass, stage Ib; pathology positive for adenocarcinoma PD-L1 0% ALK negative.  Underwent right VATS right upper lobectomy on 9/23/2022.  Patient received 5000 cGy to left lung in March of this year, after restaging PET from 1/25/2023 showed a hypermetabolic HARRY opacity highly suspicious for malignancy.  Today's CT chest consistent with posterior right upper lobe lobectomy, small large airways inflammation more pronounced compared to prior imaging, a nodular lesion within the apical posterior segment of the left upper lobe, appearing more nodular in configuration, measuring 3.2 x 2.0 cm.  Findings concerning for left upper lobe neoplasm.  Additional bibasilar pleural effusions decrease in extent on the right side and more pronounced mediastinal and paraesophageal lymphadenopathy.  There was a new groundglass opacity within the superior aspect of the residual RML for which short interval surveillance suggested.  Oncology consulted as above.    PAST MEDICAL & SURGICAL HISTORY:  BPH (benign prostatic hyperplasia)  Bipolar disorder  Depression  Anxiety  Umbilical hernia, incarcerated  Depression  Constipation  Urinary retention  CAD (coronary artery disease)  SCC (squamous cell carcinoma)  Lung mass  Other nonspecific abnormal finding of lung field  LV (left ventricular) mural thrombus  History of inguinal hernia  History of CHF (congestive heart failure)  History of arthroscopy of knee-Right, 1987  History of tonsillectomy-1952  History of hernia repair  H/O coronary angiogram    MEDICATION:  albuterol/ipratropium for Nebulization 3 milliLiter(s) Nebulizer every 6 hours  aspirin enteric coated 81 milliGRAM(s) Oral daily  budesonide 160 MICROgram(s)/formoterol 4.5 MICROgram(s) Inhaler 2 Puff(s) Inhalation two times a day  buMETAnide 2 milliGRAM(s) Oral daily  clopidogrel Tablet 75 milliGRAM(s) Oral daily  FLUoxetine 20 milliGRAM(s) Oral <User Schedule>  guaiFENesin ER 1200 milliGRAM(s) Oral every 12 hours  heparin   Injectable 5000 Unit(s) SubCutaneous every 8 hours  metoprolol succinate ER 25 milliGRAM(s) Oral daily  mirtazapine 7.5 milliGRAM(s) Oral at bedtime  tamsulosin 0.4 milliGRAM(s) Oral at bedtime    ALLERGIES:  No Known Allergies    FAMILY HISTORY:  Reviewed, non-contributory: [ x]     SOCIAL HISTORY:  Tobacco: YES [ ]  ; NO [x ]; Former smoker [ ]  Alcohol:   YES [ ]  ; NO [ x]; Social alcohol user [ ]    REVIEW SYSTEMS:  Constitutional: no fever, chills, night sweats, no weight loss  HEENT: denies visual changes; no oral ulcers, dysphagia, no epistaxis;   Respiratory: no dyspnea , wheezing, cough, hemoptysis  Cardiovascular: denies acute chest pain, palpitations  GI: no loss of appetite, dark stools, or abdominal tenderness / pain; no change in bowel habits.  Musculoskeletal: no new back pain, bone/ joint pain ,no extremity swelling  Integumentary: denies pruritus; no skin rash, bruises, no  suspicious skin lesions  Neurologic: denies peripheral numbness, no dizziness, no gait problems.  Heme: no reported easy bruisability; no lymph node enlargement    VITALS:  T(C): 36.4, Max: 36.4 (12-18-23 @ 20:00)  T(F): 97.6, Max: 97.6 (12-18-23 @ 20:00)  HR: 67 (60 - 73)  BP: 96/62 (96/62 - 103/68)  SpO2: 96% (96% - 99%)    PHYSICAL EXAM:  Constitutional: alert, well developed  HEENT: normocephalic, anicteric sclerae, no mucositis or thrush  Respiratory: bilateral clear to auscultation anteriorly  Cardiovascular : S1, S2 regular, rhythmic, no murmurs, gallops or rubs  Abdomen: soft, distended, + normoactive BS, no palpable HS- megaly  Extremities: no tenderness;  -c/c/e, pulses equal bilaterally  Integumentary: no rashes, scars, or lesions suggestive of malignancy  Neurologic: no gross focal deficits    LABS:  (12-19) WBC: 4.81 K/uL,Hemoglobin: 11.0 g/dL, Hematocrit: 32.7 %,  Platelet: 91 K/uL  (12-19) Na: 128 mmol/L ; K: 4.4 mmol/L ; BUN: 101 mg/dL ; Cr: 2.15 mg/dL.    RADIOLOGY:  ACC: [id number] EXAM:  CT CHEST   ORDERED BY:  RAJI GUTHRIE     PROCEDURE DATE:  12/18/2023          INTERPRETATION:  CLINICAL INFORMATION: Torus of breath 78-year-old male   with shortness of breath    COMPARISON: This radiograph 12/18/2023 and CT chest 9/27/2023   CONTRAST/COMPLICATIONS:  IV Contrast: NONE  Oral Contrast: NONE  Complications: None reported at time of study completion    PROCEDURE:  CT of the Chest was performed.  Sagittal and coronal reformats were performed.    FINDINGS:    LUNGS AND AIRWAYS: Central airways are patent. Post right upper lobe   lobectomy with stable regional postsurgical change. Confluent   centrilobular emphysema is noted most pronounced within the right upper   lobe. There is bronchial wall thickening with areas of mucous plugging   bilaterally consistent with large airways disease. Additional numerous   tree-in-bud micronodules are noted compatible with small airways   inflammation. These findings are more pronounced when compared to prior   imaging.  There may be some minimal interlobular septal thickening to   suggest pulmonary vascular congestion. There is a new again noted is a   linear and nodular opacity within the apical posterior segment left upper   lobe 14 x 16 mm a groundglass nodule within the superior aspect of the   residual right middle lobe (303:101). Again noted is a linear nodular   opacity within the apical posterior segment of the left upper lobe which   appears more rounded in configuration measuring approximately 20 x 32 mm   (it measured 32 x 16 mm on prior imaging). There may be some increased   adjacent interlobular septal thickening concerning for lymphangitic   spread of disease.  PLEURA: There are bibasilar pleural effusions trace on the left side and   mild on the right. Has been an interval decrease in the size of the right   pleural effusion when compared to prior imaging.  Slightly more pronounced when compared to prior imaging. MEDIASTINUM AND   MICHELLE: There are increased number of top normal size mediastinal lymph   nodes the largest in the subcarinal region measuring 12 mm in short axis   (301:55) multiple stable periesophageal lymph nodes which are mildly   enlarged measuring up to 11 mm in short axis (301:80 and 3 1:113)  VESSELS: Mild calcified aortic mural plaque most pronounced in the aortic   arch descending and proximal abdominal aorta and major side branches.   There is no evidence of aortic aneurysm. Main pulmonary artery is upper   limits of normal caliber unchanged from prior imaging measuring 2.2 cm.   Underlying mild pulmonary hypertension cannot be excluded.  HEART: Cardiomegaly with multiple chamber enlargement. Small pericardial   effusion unchanged. Severe coronary artery calcification/stents.  CHEST WALL AND LOWER NECK: Moderate symmetric gynecomastia.  VISUALIZED UPPER ABDOMEN: Stable, too small to characterize right hepatic   dome low-attenuation structure measuring 10 mm. Cholelithiasis   redemonstrated. Thickening of the limbs of the adrenal glands bilaterally   consistent with adrenal gland hyperplasia unchanged. Small type I hiatal   hernia.  BONES: Moderate multilevel degenerative disc disease of the midthoracic   spine. No aggressive osseous lesions. Degenerative changes of both   glenohumeral joints    IMPRESSION:    1. Post right upper lobe lobectomy. No evidence of local disease   recurrence.  2. Findings compatible with small large airways inflammation more   pronounced when compared to prior imaging.  3. A nodular lesion within the apical posterior segment of the left upper   lobe which appears more nodular configuration measuring 32 x 20 mm   (333:142). Findings concerning for left upper lobe neoplasm and   correlation with whole-body PET/CT is suggested.  4. Bibasilar pleural effusions which have decreased slightly in extent on   the right side.  5. Mediastinal as well as paraesophageal lymphadenopath slightly more   pronounced.  6. New groundglass opacity within the superior aspect of the residual   right middle lobe. Short interval surveillance in 3-6 months is suggested.

## 2023-12-20 LAB
ALBUMIN SERPL ELPH-MCNC: 2.9 G/DL — LOW (ref 3.3–5)
ALBUMIN SERPL ELPH-MCNC: 2.9 G/DL — LOW (ref 3.3–5)
ALP SERPL-CCNC: 296 U/L — HIGH (ref 40–120)
ALP SERPL-CCNC: 296 U/L — HIGH (ref 40–120)
ALT FLD-CCNC: 43 U/L — SIGNIFICANT CHANGE UP (ref 10–45)
ALT FLD-CCNC: 43 U/L — SIGNIFICANT CHANGE UP (ref 10–45)
ANION GAP SERPL CALC-SCNC: 11 MMOL/L — SIGNIFICANT CHANGE UP (ref 5–17)
ANION GAP SERPL CALC-SCNC: 11 MMOL/L — SIGNIFICANT CHANGE UP (ref 5–17)
AST SERPL-CCNC: 51 U/L — HIGH (ref 10–40)
AST SERPL-CCNC: 51 U/L — HIGH (ref 10–40)
BILIRUB SERPL-MCNC: 1.3 MG/DL — HIGH (ref 0.2–1.2)
BILIRUB SERPL-MCNC: 1.3 MG/DL — HIGH (ref 0.2–1.2)
BUN SERPL-MCNC: 107 MG/DL — HIGH (ref 7–23)
BUN SERPL-MCNC: 107 MG/DL — HIGH (ref 7–23)
CALCIUM SERPL-MCNC: 9.5 MG/DL — SIGNIFICANT CHANGE UP (ref 8.4–10.5)
CALCIUM SERPL-MCNC: 9.5 MG/DL — SIGNIFICANT CHANGE UP (ref 8.4–10.5)
CHLORIDE SERPL-SCNC: 96 MMOL/L — SIGNIFICANT CHANGE UP (ref 96–108)
CHLORIDE SERPL-SCNC: 96 MMOL/L — SIGNIFICANT CHANGE UP (ref 96–108)
CO2 SERPL-SCNC: 22 MMOL/L — SIGNIFICANT CHANGE UP (ref 22–31)
CO2 SERPL-SCNC: 22 MMOL/L — SIGNIFICANT CHANGE UP (ref 22–31)
CREAT SERPL-MCNC: 1.73 MG/DL — HIGH (ref 0.5–1.3)
CREAT SERPL-MCNC: 1.73 MG/DL — HIGH (ref 0.5–1.3)
EGFR: 40 ML/MIN/1.73M2 — LOW
EGFR: 40 ML/MIN/1.73M2 — LOW
GLUCOSE SERPL-MCNC: 154 MG/DL — HIGH (ref 70–99)
GLUCOSE SERPL-MCNC: 154 MG/DL — HIGH (ref 70–99)
HCT VFR BLD CALC: 31.2 % — LOW (ref 39–50)
HCT VFR BLD CALC: 31.2 % — LOW (ref 39–50)
HGB BLD-MCNC: 10.4 G/DL — LOW (ref 13–17)
HGB BLD-MCNC: 10.4 G/DL — LOW (ref 13–17)
MCHC RBC-ENTMCNC: 31.8 PG — SIGNIFICANT CHANGE UP (ref 27–34)
MCHC RBC-ENTMCNC: 31.8 PG — SIGNIFICANT CHANGE UP (ref 27–34)
MCHC RBC-ENTMCNC: 33.3 GM/DL — SIGNIFICANT CHANGE UP (ref 32–36)
MCHC RBC-ENTMCNC: 33.3 GM/DL — SIGNIFICANT CHANGE UP (ref 32–36)
MCV RBC AUTO: 95.4 FL — SIGNIFICANT CHANGE UP (ref 80–100)
MCV RBC AUTO: 95.4 FL — SIGNIFICANT CHANGE UP (ref 80–100)
NRBC # BLD: 0 /100 WBCS — SIGNIFICANT CHANGE UP (ref 0–0)
NRBC # BLD: 0 /100 WBCS — SIGNIFICANT CHANGE UP (ref 0–0)
OSMOLALITY UR: 418 MOSM/KG — SIGNIFICANT CHANGE UP (ref 50–1200)
OSMOLALITY UR: 418 MOSM/KG — SIGNIFICANT CHANGE UP (ref 50–1200)
PLATELET # BLD AUTO: 96 K/UL — LOW (ref 150–400)
PLATELET # BLD AUTO: 96 K/UL — LOW (ref 150–400)
POTASSIUM SERPL-MCNC: 4.2 MMOL/L — SIGNIFICANT CHANGE UP (ref 3.5–5.3)
POTASSIUM SERPL-MCNC: 4.2 MMOL/L — SIGNIFICANT CHANGE UP (ref 3.5–5.3)
POTASSIUM SERPL-SCNC: 4.2 MMOL/L — SIGNIFICANT CHANGE UP (ref 3.5–5.3)
POTASSIUM SERPL-SCNC: 4.2 MMOL/L — SIGNIFICANT CHANGE UP (ref 3.5–5.3)
PROT SERPL-MCNC: 7.3 G/DL — SIGNIFICANT CHANGE UP (ref 6–8.3)
PROT SERPL-MCNC: 7.3 G/DL — SIGNIFICANT CHANGE UP (ref 6–8.3)
RBC # BLD: 3.27 M/UL — LOW (ref 4.2–5.8)
RBC # BLD: 3.27 M/UL — LOW (ref 4.2–5.8)
RBC # FLD: 17.2 % — HIGH (ref 10.3–14.5)
RBC # FLD: 17.2 % — HIGH (ref 10.3–14.5)
SODIUM SERPL-SCNC: 129 MMOL/L — LOW (ref 135–145)
SODIUM SERPL-SCNC: 129 MMOL/L — LOW (ref 135–145)
SODIUM UR-SCNC: <20 MMOL/L — SIGNIFICANT CHANGE UP
SODIUM UR-SCNC: <20 MMOL/L — SIGNIFICANT CHANGE UP
WBC # BLD: 8.93 K/UL — SIGNIFICANT CHANGE UP (ref 3.8–10.5)
WBC # BLD: 8.93 K/UL — SIGNIFICANT CHANGE UP (ref 3.8–10.5)
WBC # FLD AUTO: 8.93 K/UL — SIGNIFICANT CHANGE UP (ref 3.8–10.5)
WBC # FLD AUTO: 8.93 K/UL — SIGNIFICANT CHANGE UP (ref 3.8–10.5)

## 2023-12-20 PROCEDURE — 99232 SBSQ HOSP IP/OBS MODERATE 35: CPT

## 2023-12-20 PROCEDURE — 99233 SBSQ HOSP IP/OBS HIGH 50: CPT

## 2023-12-20 RX ORDER — FLUOXETINE HCL 10 MG
20 CAPSULE ORAL
Refills: 0 | Status: DISCONTINUED | OUTPATIENT
Start: 2023-12-20 | End: 2023-12-21

## 2023-12-20 RX ORDER — FLUOXETINE HCL 10 MG
1 CAPSULE ORAL
Refills: 0 | DISCHARGE
Start: 2023-12-20

## 2023-12-20 RX ADMIN — Medication 3 MILLILITER(S): at 22:04

## 2023-12-20 RX ADMIN — BUDESONIDE AND FORMOTEROL FUMARATE DIHYDRATE 2 PUFF(S): 160; 4.5 AEROSOL RESPIRATORY (INHALATION) at 08:40

## 2023-12-20 RX ADMIN — HEPARIN SODIUM 5000 UNIT(S): 5000 INJECTION INTRAVENOUS; SUBCUTANEOUS at 21:18

## 2023-12-20 RX ADMIN — Medication 81 MILLIGRAM(S): at 12:21

## 2023-12-20 RX ADMIN — TEMAZEPAM 15 MILLIGRAM(S): 15 CAPSULE ORAL at 23:30

## 2023-12-20 RX ADMIN — MIRTAZAPINE 7.5 MILLIGRAM(S): 45 TABLET, ORALLY DISINTEGRATING ORAL at 21:18

## 2023-12-20 RX ADMIN — Medication 3 MILLILITER(S): at 08:40

## 2023-12-20 RX ADMIN — CLOPIDOGREL BISULFATE 75 MILLIGRAM(S): 75 TABLET, FILM COATED ORAL at 12:22

## 2023-12-20 RX ADMIN — Medication 3 MILLILITER(S): at 15:06

## 2023-12-20 RX ADMIN — Medication 40 MILLIGRAM(S): at 12:21

## 2023-12-20 RX ADMIN — TAMSULOSIN HYDROCHLORIDE 0.4 MILLIGRAM(S): 0.4 CAPSULE ORAL at 21:19

## 2023-12-20 RX ADMIN — BUDESONIDE AND FORMOTEROL FUMARATE DIHYDRATE 2 PUFF(S): 160; 4.5 AEROSOL RESPIRATORY (INHALATION) at 22:04

## 2023-12-20 RX ADMIN — Medication 1200 MILLIGRAM(S): at 12:22

## 2023-12-20 RX ADMIN — BUMETANIDE 2 MILLIGRAM(S): 0.25 INJECTION INTRAMUSCULAR; INTRAVENOUS at 12:22

## 2023-12-20 RX ADMIN — HEPARIN SODIUM 5000 UNIT(S): 5000 INJECTION INTRAVENOUS; SUBCUTANEOUS at 12:25

## 2023-12-20 RX ADMIN — TEMAZEPAM 15 MILLIGRAM(S): 15 CAPSULE ORAL at 00:22

## 2023-12-20 RX ADMIN — Medication 1200 MILLIGRAM(S): at 17:48

## 2023-12-20 NOTE — PROGRESS NOTE ADULT - SUBJECTIVE AND OBJECTIVE BOX
ELYSE MALAVE  [id number]      Chief Complaint: RSV/Lung cancer/Chronic HFrEF    Interval History: The patient reports mild coughing.     Tele: sinus rhythm 60s BPM      Current meds:   acetaminophen     Tablet .. 650 milliGRAM(s) Oral every 6 hours PRN  albuterol/ipratropium for Nebulization 3 milliLiter(s) Nebulizer every 6 hours  aluminum hydroxide/magnesium hydroxide/simethicone Suspension 30 milliLiter(s) Oral every 4 hours PRN  aspirin enteric coated 81 milliGRAM(s) Oral daily  budesonide 160 MICROgram(s)/formoterol 4.5 MICROgram(s) Inhaler 2 Puff(s) Inhalation two times a day  buMETAnide 2 milliGRAM(s) Oral daily  clopidogrel Tablet 75 milliGRAM(s) Oral daily  FLUoxetine 20 milliGRAM(s) Oral <User Schedule>  FLUoxetine 40 milliGRAM(s) Oral <User Schedule>  guaiFENesin ER 1200 milliGRAM(s) Oral every 12 hours  heparin   Injectable 5000 Unit(s) SubCutaneous every 8 hours  melatonin 3 milliGRAM(s) Oral at bedtime PRN  metoprolol succinate ER 25 milliGRAM(s) Oral daily  mirtazapine 7.5 milliGRAM(s) Oral at bedtime  ondansetron Injectable 4 milliGRAM(s) IV Push every 8 hours PRN  predniSONE   Tablet 40 milliGRAM(s) Oral daily  tamsulosin 0.4 milliGRAM(s) Oral at bedtime  temazepam 15 milliGRAM(s) Oral at bedtime PRN      Objective:     Vital Signs:   T(C): 36 (12-20-23 @ 05:28), Max: 36.5 (12-19-23 @ 22:30)  HR: 64 (12-20-23 @ 05:28) (64 - 68)  BP: 110/63 (12-20-23 @ 05:28) (98/56 - 115/66)  RR: 18 (12-20-23 @ 05:28) (18 - 18)  SpO2: 98% (12-20-23 @ 09:03) (96% - 98%)  Wt(kg): --    Physical Exam:   General: coughing   Neck: supple   CVS: JVP ~ 7 cm H20, RRR, s1, s2  Pulm: coarse breath sounds, wheezing   Ext: no lower extremity edema b/l   Neuro: awake, alert and oriented   Psych: Normal affect      Labs:   20 Dec 2023 06:00    129    |  96     |  107    ----------------------------<  154    4.2     |  22     |  1.73     Ca    9.5        20 Dec 2023 06:00    TPro  7.3    /  Alb  2.9    /  TBili  1.3    /  DBili  x      /  AST  51     /  ALT  43     /  AlkPhos  296    20 Dec 2023 06:00                          10.4   8.93  )-----------( 96       ( 20 Dec 2023 06:00 )             31.2         TTE (11/2022):  LVEF 22%    ECG (12/19/23): sinus bradycardia, left axis deviation, PVCs, 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 Severely decreased global left ventricular systolic function.   2. Left ventricular ejection fraction, by visual estimation, is 25 to 30%.   3. Severe global left ventricle hypokinesis with regional variation; the   entire apex and mid inferoseptal wall appear akinetic.   4. Mildly increased LV wall thickness.   5. Normal left ventricular internal cavity size.   6. Severely enlarged left atrium.   7. Right atrial enlargement.   8. Mild mitral annular calcification.   9. Moderate mitral valve regurgitation.  10. Moderate tricuspid regurgitation.  11. Mild aortic regurgitation.  12. No aortic valve stenosis.  13. Mild pulmonic valve regurgitation.  14. Top normal sized aorta at the Sinuses of Valsalva (3.9 cm).  15. Estimated pulmonary artery systolic pressure is 43.7 mmHg assuming a right atrial pressure of 15 mmHg, which is consistent with mild pulmonary hypertension.  16. There is no evidence of pericardial effusion.   ELYSE MALAVE  [id number]      Chief Complaint: RSV/Lung cancer/Chronic HFrEF    Interval History: The patient reports mild coughing.     Tele: sinus rhythm 60s BPM      Current meds:   acetaminophen     Tablet .. 650 milliGRAM(s) Oral every 6 hours PRN  albuterol/ipratropium for Nebulization 3 milliLiter(s) Nebulizer every 6 hours  aluminum hydroxide/magnesium hydroxide/simethicone Suspension 30 milliLiter(s) Oral every 4 hours PRN  aspirin enteric coated 81 milliGRAM(s) Oral daily  budesonide 160 MICROgram(s)/formoterol 4.5 MICROgram(s) Inhaler 2 Puff(s) Inhalation two times a day  buMETAnide 2 milliGRAM(s) Oral daily  clopidogrel Tablet 75 milliGRAM(s) Oral daily  FLUoxetine 20 milliGRAM(s) Oral <User Schedule>  FLUoxetine 40 milliGRAM(s) Oral <User Schedule>  guaiFENesin ER 1200 milliGRAM(s) Oral every 12 hours  heparin   Injectable 5000 Unit(s) SubCutaneous every 8 hours  melatonin 3 milliGRAM(s) Oral at bedtime PRN  metoprolol succinate ER 25 milliGRAM(s) Oral daily  mirtazapine 7.5 milliGRAM(s) Oral at bedtime  ondansetron Injectable 4 milliGRAM(s) IV Push every 8 hours PRN  predniSONE   Tablet 40 milliGRAM(s) Oral daily  tamsulosin 0.4 milliGRAM(s) Oral at bedtime  temazepam 15 milliGRAM(s) Oral at bedtime PRN      Objective:     Vital Signs:   T(C): 36 (12-20-23 @ 05:28), Max: 36.5 (12-19-23 @ 22:30)  HR: 64 (12-20-23 @ 05:28) (64 - 68)  BP: 110/63 (12-20-23 @ 05:28) (98/56 - 115/66)  RR: 18 (12-20-23 @ 05:28) (18 - 18)  SpO2: 98% (12-20-23 @ 09:03) (96% - 98%)  Wt(kg): --    Physical Exam:   General: coughing   Neck: supple   CVS: JVP ~ 7 cm H20, RRR, s1, s2  Pulm: coarse breath sounds, wheezing   Ext: no lower extremity edema b/l   Neuro: awake, alert and oriented   Psych: Normal affect      Labs:   20 Dec 2023 06:00    129    |  96     |  107    ----------------------------<  154    4.2     |  22     |  1.73     Ca    9.5        20 Dec 2023 06:00    TPro  7.3    /  Alb  2.9    /  TBili  1.3    /  DBili  x      /  AST  51     /  ALT  43     /  AlkPhos  296    20 Dec 2023 06:00                          10.4   8.93  )-----------( 96       ( 20 Dec 2023 06:00 )             31.2         TTE (11/2022):  LVEF 22%    ECG (12/19/23): sinus bradycardia, left axis deviation, PVCs, 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 Severely decreased global left ventricular systolic function.   2. Left ventricular ejection fraction, by visual estimation, is 25 to 30%.   3. Severe global left ventricle hypokinesis with regional variation; the   entire apex and mid inferoseptal wall appear akinetic.   4. Mildly increased LV wall thickness.   5. Normal left ventricular internal cavity size.   6. Severely enlarged left atrium.   7. Right atrial enlargement.   8. Mild mitral annular calcification.   9. Moderate mitral valve regurgitation.  10. Moderate tricuspid regurgitation.  11. Mild aortic regurgitation.  12. No aortic valve stenosis.  13. Mild pulmonic valve regurgitation.  14. Top normal sized aorta at the Sinuses of Valsalva (3.9 cm).  15. Estimated pulmonary artery systolic pressure is 43.7 mmHg assuming a right atrial pressure of 15 mmHg, which is consistent with mild pulmonary hypertension.  16. There is no evidence of pericardial effusion.

## 2023-12-20 NOTE — PROGRESS NOTE ADULT - ASSESSMENT
Assessment:  John Becerra is a 78 year old man with past medical history of Multivessel Severe Coronary artery disease, Chronic systolic heart failure/HFrEF (LVEF 35% in May 2022, LVEF 15-20% in September 2023), Lung adenocarcinoma (s/p RUL lobectomy) presented with progressive dyspnea and wheezing, found to have acute respiratory failure secondary to RSV.     ECG consistent with sinus rhythm, left axis deviation and old anterior infarct, similar to prior ECG. Troponins elevated and have peaked, patient denies angina, this may be due to renal dysfunction. CT chest with consistent with left upper lobe neoplasm and new groundglass opacity of right middle lobe and the pleural effusions are decreased in size.     Prior coronary angiogram from 2022 with normal left main, 70% stenosis of LAD, 70% stenosis of first diagonal, normal LCx and 100% stenosis of RCA that was medically managed after viability testing indicated mostly infarcted tissue. Most recent echo report from July with LVEF 23%, RV dysfunction and moderate pulmonary hypertension.     Echo repeated and consistent with known severe cardiomyopathy, LVEF 25-30% and mild pulmonary hypertension.     Recommendations:  [] RSV, Lung cancer: Follow up Pulmonary recommendations regarding medication optimization, patient will need Oncology re-evaluation given CT chest findings  [] HFrEF: Chronic and patient appears near-euvolemic. Overall, symptoms do not appear to be primary systolic heart failure at this time. Resume home meds; including Bumex, Metoprolol. Would hold ARB and MRA due to BORDY on CKD. Further follow up for GDMT as outpatient with his cardiologist.     Discussed with Pulmonary/Dr. Brewster. We will sign off, please re-consult if needed. The patient should follow up with his cardiologist when discharged.     Adam Gilliam MD  Cardiology.   Assessment:  John Becerra is a 78 year old man with past medical history of Multivessel Severe Coronary artery disease, Chronic systolic heart failure/HFrEF (LVEF 35% in May 2022, LVEF 15-20% in September 2023), Lung adenocarcinoma (s/p RUL lobectomy) presented with progressive dyspnea and wheezing, found to have acute respiratory failure secondary to RSV.     ECG consistent with sinus rhythm, left axis deviation and old anterior infarct, similar to prior ECG. Troponins elevated and have peaked, patient denies angina, this may be due to renal dysfunction. CT chest with consistent with left upper lobe neoplasm and new groundglass opacity of right middle lobe and the pleural effusions are decreased in size.     Prior coronary angiogram from 2022 with normal left main, 70% stenosis of LAD, 70% stenosis of first diagonal, normal LCx and 100% stenosis of RCA that was medically managed after viability testing indicated mostly infarcted tissue. Most recent echo report from July with LVEF 23%, RV dysfunction and moderate pulmonary hypertension.     Echo repeated and consistent with known severe cardiomyopathy, LVEF 25-30% and mild pulmonary hypertension.     Recommendations:  [] RSV, Lung cancer: Follow up Pulmonary recommendations regarding medication optimization, patient will need Oncology re-evaluation given CT chest findings  [] HFrEF: Chronic and patient appears near-euvolemic. Overall, symptoms do not appear to be primary systolic heart failure at this time. Resume home meds; including Bumex, Metoprolol. Would hold ARB and MRA due to BRODY on CKD. Further follow up for GDMT as outpatient with his cardiologist.     Discussed with Pulmonary/Dr. Brewster. We will sign off, please re-consult if needed. The patient should follow up with his cardiologist when discharged.     Adam Gilliam MD  Cardiology.

## 2023-12-20 NOTE — PROGRESS NOTE ADULT - SUBJECTIVE AND OBJECTIVE BOX
ELYSE MALAVE, 78y Male  MRN: [id number]  ATTENDING: Sulma Ferrell    HPI:  78M, never smoker PMHx HTN, HLD C HFrEF, CAD, lung adenocarcinoma, s/p RUL lobectomy September 2022, depression, admitted to [facility] with increasing dyspnea and wheezing x 4 days.  Denies associated sore throat, postnasal drip, cough or sputum production.  In ED was found mildly anemic and thrombocytopenic.  Patient's initial diagnosis was in July 2022, when he presented with a right upper lung mass, stage Ib; pathology positive for adenocarcinoma PD-L1 0% ALK negative.  Underwent right VATS right upper lobectomy on 9/23/2022.  Patient received 5000 cGy to left lung in March of this year, after restaging PET from 1/25/2023 showed a hypermetabolic HARRY opacity highly suspicious for malignancy.  Today's CT chest consistent with posterior right upper lobe lobectomy, small large airways inflammation more pronounced compared to prior imaging, a nodular lesion within the apical posterior segment of the left upper lobe, appearing more nodular in configuration, measuring 3.2 x 2.0 cm.  Findings concerning for left upper lobe neoplasm.  Additional bibasilar pleural effusions decrease in extent on the right side and more pronounced mediastinal and paraesophageal lymphadenopathy.  There was a new groundglass opacity within the superior aspect of the residual RML for which short interval surveillance suggested.  Oncology consulted as above.    MEDICATIONS:  acetaminophen     Tablet .. 650 milliGRAM(s) Oral every 6 hours PRN  albuterol/ipratropium for Nebulization 3 milliLiter(s) Nebulizer every 6 hours  aluminum hydroxide/magnesium hydroxide/simethicone Suspension 30 milliLiter(s) Oral every 4 hours PRN  aspirin enteric coated 81 milliGRAM(s) Oral daily  budesonide 160 MICROgram(s)/formoterol 4.5 MICROgram(s) Inhaler 2 Puff(s) Inhalation two times a day  buMETAnide 2 milliGRAM(s) Oral daily  clopidogrel Tablet 75 milliGRAM(s) Oral daily  FLUoxetine 20 milliGRAM(s) Oral <User Schedule>  FLUoxetine 40 milliGRAM(s) Oral <User Schedule>  guaiFENesin ER 1200 milliGRAM(s) Oral every 12 hours  heparin   Injectable 5000 Unit(s) SubCutaneous every 8 hours  melatonin 3 milliGRAM(s) Oral at bedtime PRN  metoprolol succinate ER 25 milliGRAM(s) Oral daily  mirtazapine 7.5 milliGRAM(s) Oral at bedtime  ondansetron Injectable 4 milliGRAM(s) IV Push every 8 hours PRN  predniSONE   Tablet 40 milliGRAM(s) Oral daily  tamsulosin 0.4 milliGRAM(s) Oral at bedtime  temazepam 15 milliGRAM(s) Oral at bedtime PRN    All other medications reviewed.    SUBJECTIVE:  patient weak, wheezing under slightly better control    VITALS:  T(C): 36 (12-20-23 @ 05:28), Max: 36.5 (12-19-23 @ 22:30)  T(F): 96.8 (12-20-23 @ 05:28), Max: 97.7 (12-19-23 @ 22:30)  HR: 64 (12-20-23 @ 05:28) (64 - 68)  BP: 110/63 (12-20-23 @ 05:28) (98/56 - 115/66)    PHYSICAL EXAM:  Constitutional: alert, well developed  HEENT: normocephalic, anicteric sclerae, no mucositis or thrush  Respiratory: bilateral crackles to auscultation anteriorly  Cardiovascular : S1, S2 regular, rhythmic, no murmurs, gallops or rubs  Abdomen: soft, distended, + normoactive BS, no palpable HS- megaly  Extremities: no tenderness;  -c/c/e, pulses equal bilaterally    LABS:  (12-20) WBC: 8.93 K/uL,Hemoglobin: 10.4 g/dL, Hematocrit: 31.2 %,  Platelet: 96 K/uL  (12-20) Na: 129 mmol/L ; K: 4.2 mmol/L ; BUN: 107 mg/dL ; Cr: 1.73 mg/dL.    RADIOLOGY:  ACC: [id number] EXAM:  CT CHEST   ORDERED BY:  RAJI GUTHRIE     PROCEDURE DATE:  12/18/2023          INTERPRETATION:  CLINICAL INFORMATION: Torus of breath 78-year-old male   with shortness of breath    COMPARISON: This radiograph 12/18/2023 and CT chest 9/27/2023   CONTRAST/COMPLICATIONS:  IV Contrast: NONE  Oral Contrast: NONE  Complications: None reported at time of study completion    PROCEDURE:  CT of the Chest was performed.  Sagittal and coronal reformats were performed.    FINDINGS:    LUNGS AND AIRWAYS: Central airways are patent. Post right upper lobe   lobectomy with stable regional postsurgical change. Confluent   centrilobular emphysema is noted most pronounced within the right upper   lobe. There is bronchial wall thickening with areas of mucous plugging   bilaterally consistent with large airways disease. Additional numerous   tree-in-bud micronodules are noted compatible with small airways   inflammation. These findings are more pronounced when compared to prior   imaging.  There may be some minimal interlobular septal thickening to   suggest pulmonary vascular congestion. There is a new again noted is a   linear and nodular opacity within the apical posterior segment left upper   lobe 14 x 16 mm a groundglass nodule within the superior aspect of the   residual right middle lobe (303:101). Again noted is a linear nodular   opacity within the apical posterior segment of the left upper lobe which   appears more rounded in configuration measuring approximately 20 x 32 mm   (it measured 32 x 16 mm on prior imaging). There may be some increased   adjacent interlobular septal thickening concerning for lymphangitic   spread of disease.  PLEURA: There are bibasilar pleural effusions trace on the left side and   mild on the right. Has been an interval decrease in the size of the right   pleural effusion when compared to prior imaging.  Slightly more pronounced when compared to prior imaging. MEDIASTINUM AND   MICHELLE: There are increased number of top normal size mediastinal lymph   nodes the largest in the subcarinal region measuring 12 mm in short axis   (301:55) multiple stable periesophageal lymph nodes which are mildly   enlarged measuring up to 11 mm in short axis (301:80 and 3 1:113)  VESSELS: Mild calcified aortic mural plaque most pronounced in the aortic   arch descending and proximal abdominal aorta and major side branches.   There is no evidence of aortic aneurysm. Main pulmonary artery is upper   limits of normal caliber unchanged from prior imaging measuring 2.2 cm.   Underlying mild pulmonary hypertension cannot be excluded.  HEART: Cardiomegaly with multiple chamber enlargement. Small pericardial   effusion unchanged. Severe coronary artery calcification/stents.  CHEST WALL AND LOWER NECK: Moderate symmetric gynecomastia.  VISUALIZED UPPER ABDOMEN: Stable, too small to characterize right hepatic   dome low-attenuation structure measuring 10 mm. Cholelithiasis   redemonstrated. Thickening of the limbs of the adrenal glands bilaterally   consistent with adrenal gland hyperplasia unchanged. Small type I hiatal   hernia.  BONES: Moderate multilevel degenerative disc disease of the midthoracic   spine. No aggressive osseous lesions. Degenerative changes of both   glenohumeral joints    IMPRESSION:    1. Post right upper lobe lobectomy. No evidence of local disease   recurrence.  2. Findings compatible with small large airways inflammation more   pronounced when compared to prior imaging.  3. A nodular lesion within the apical posterior segment of the left upper   lobe which appears more nodular configuration measuring 32 x 20 mm   (333:142). Findings concerning for left upper lobe neoplasm and   correlation with whole-body PET/CT is suggested.  4. Bibasilar pleural effusions which have decreased slightly in extent on   the right side.  5. Mediastinal as well as paraesophageal lymphadenopath slightly more   pronounced.  6. New groundglass opacity within the superior aspect of the residual   right middle lobe. Short interval surveillance in 3-6 months is suggested.           ELYSE MALAVE, 78y Male  MRN: [id number]  ATTENDING: Sulma Ferrell    HPI:  78M, never smoker PMHx HTN, HLD C HFrEF, CAD, lung adenocarcinoma, s/p RUL lobectomy September 2022, depression, admitted to [facility] with increasing dyspnea and wheezing x 4 days.  Denies associated sore throat, postnasal drip, cough or sputum production.  In ED was found mildly anemic and thrombocytopenic.  Patient's initial diagnosis was in July 2022, when he presented with a right upper lung mass, stage Ib; pathology positive for adenocarcinoma PD-L1 0% ALK negative.  Underwent right VATS right upper lobectomy on 9/23/2022.  Patient received 5000 cGy to left lung in March of this year, after restaging PET from 1/25/2023 showed a hypermetabolic HARRY opacity highly suspicious for malignancy.  Today's CT chest consistent with posterior right upper lobe lobectomy, small large airways inflammation more pronounced compared to prior imaging, a nodular lesion within the apical posterior segment of the left upper lobe, appearing more nodular in configuration, measuring 3.2 x 2.0 cm.  Findings concerning for left upper lobe neoplasm.  Additional bibasilar pleural effusions decrease in extent on the right side and more pronounced mediastinal and paraesophageal lymphadenopathy.  There was a new groundglass opacity within the superior aspect of the residual RML for which short interval surveillance suggested.  Oncology consulted as above.    MEDICATIONS:  acetaminophen     Tablet .. 650 milliGRAM(s) Oral every 6 hours PRN  albuterol/ipratropium for Nebulization 3 milliLiter(s) Nebulizer every 6 hours  aluminum hydroxide/magnesium hydroxide/simethicone Suspension 30 milliLiter(s) Oral every 4 hours PRN  aspirin enteric coated 81 milliGRAM(s) Oral daily  budesonide 160 MICROgram(s)/formoterol 4.5 MICROgram(s) Inhaler 2 Puff(s) Inhalation two times a day  buMETAnide 2 milliGRAM(s) Oral daily  clopidogrel Tablet 75 milliGRAM(s) Oral daily  FLUoxetine 20 milliGRAM(s) Oral <User Schedule>  FLUoxetine 40 milliGRAM(s) Oral <User Schedule>  guaiFENesin ER 1200 milliGRAM(s) Oral every 12 hours  heparin   Injectable 5000 Unit(s) SubCutaneous every 8 hours  melatonin 3 milliGRAM(s) Oral at bedtime PRN  metoprolol succinate ER 25 milliGRAM(s) Oral daily  mirtazapine 7.5 milliGRAM(s) Oral at bedtime  ondansetron Injectable 4 milliGRAM(s) IV Push every 8 hours PRN  predniSONE   Tablet 40 milliGRAM(s) Oral daily  tamsulosin 0.4 milliGRAM(s) Oral at bedtime  temazepam 15 milliGRAM(s) Oral at bedtime PRN    All other medications reviewed.    SUBJECTIVE:  patient weak, wheezing under slightly better control    VITALS:  T(C): 36 (12-20-23 @ 05:28), Max: 36.5 (12-19-23 @ 22:30)  T(F): 96.8 (12-20-23 @ 05:28), Max: 97.7 (12-19-23 @ 22:30)  HR: 64 (12-20-23 @ 05:28) (64 - 68)  BP: 110/63 (12-20-23 @ 05:28) (98/56 - 115/66)    PHYSICAL EXAM:  Constitutional: alert, well developed  HEENT: normocephalic, anicteric sclerae, no mucositis or thrush  Respiratory: bilateral crackles to auscultation anteriorly  Cardiovascular : S1, S2 regular, rhythmic, no murmurs, gallops or rubs  Abdomen: soft, distended, + normoactive BS, no palpable HS- megaly  Extremities: no tenderness;  -c/c/e, pulses equal bilaterally    LABS:  (12-20) WBC: 8.93 K/uL,Hemoglobin: 10.4 g/dL, Hematocrit: 31.2 %,  Platelet: 96 K/uL  (12-20) Na: 129 mmol/L ; K: 4.2 mmol/L ; BUN: 107 mg/dL ; Cr: 1.73 mg/dL.    RADIOLOGY:  ACC: [id number] EXAM:  CT CHEST   ORDERED BY:  RAJI GUTHRIE     PROCEDURE DATE:  12/18/2023          INTERPRETATION:  CLINICAL INFORMATION: Torus of breath 78-year-old male   with shortness of breath    COMPARISON: This radiograph 12/18/2023 and CT chest 9/27/2023   CONTRAST/COMPLICATIONS:  IV Contrast: NONE  Oral Contrast: NONE  Complications: None reported at time of study completion    PROCEDURE:  CT of the Chest was performed.  Sagittal and coronal reformats were performed.    FINDINGS:    LUNGS AND AIRWAYS: Central airways are patent. Post right upper lobe   lobectomy with stable regional postsurgical change. Confluent   centrilobular emphysema is noted most pronounced within the right upper   lobe. There is bronchial wall thickening with areas of mucous plugging   bilaterally consistent with large airways disease. Additional numerous   tree-in-bud micronodules are noted compatible with small airways   inflammation. These findings are more pronounced when compared to prior   imaging.  There may be some minimal interlobular septal thickening to   suggest pulmonary vascular congestion. There is a new again noted is a   linear and nodular opacity within the apical posterior segment left upper   lobe 14 x 16 mm a groundglass nodule within the superior aspect of the   residual right middle lobe (303:101). Again noted is a linear nodular   opacity within the apical posterior segment of the left upper lobe which   appears more rounded in configuration measuring approximately 20 x 32 mm   (it measured 32 x 16 mm on prior imaging). There may be some increased   adjacent interlobular septal thickening concerning for lymphangitic   spread of disease.  PLEURA: There are bibasilar pleural effusions trace on the left side and   mild on the right. Has been an interval decrease in the size of the right   pleural effusion when compared to prior imaging.  Slightly more pronounced when compared to prior imaging. MEDIASTINUM AND   MICHELLE: There are increased number of top normal size mediastinal lymph   nodes the largest in the subcarinal region measuring 12 mm in short axis   (301:55) multiple stable periesophageal lymph nodes which are mildly   enlarged measuring up to 11 mm in short axis (301:80 and 3 1:113)  VESSELS: Mild calcified aortic mural plaque most pronounced in the aortic   arch descending and proximal abdominal aorta and major side branches.   There is no evidence of aortic aneurysm. Main pulmonary artery is upper   limits of normal caliber unchanged from prior imaging measuring 2.2 cm.   Underlying mild pulmonary hypertension cannot be excluded.  HEART: Cardiomegaly with multiple chamber enlargement. Small pericardial   effusion unchanged. Severe coronary artery calcification/stents.  CHEST WALL AND LOWER NECK: Moderate symmetric gynecomastia.  VISUALIZED UPPER ABDOMEN: Stable, too small to characterize right hepatic   dome low-attenuation structure measuring 10 mm. Cholelithiasis   redemonstrated. Thickening of the limbs of the adrenal glands bilaterally   consistent with adrenal gland hyperplasia unchanged. Small type I hiatal   hernia.  BONES: Moderate multilevel degenerative disc disease of the midthoracic   spine. No aggressive osseous lesions. Degenerative changes of both   glenohumeral joints    IMPRESSION:    1. Post right upper lobe lobectomy. No evidence of local disease   recurrence.  2. Findings compatible with small large airways inflammation more   pronounced when compared to prior imaging.  3. A nodular lesion within the apical posterior segment of the left upper   lobe which appears more nodular configuration measuring 32 x 20 mm   (333:142). Findings concerning for left upper lobe neoplasm and   correlation with whole-body PET/CT is suggested.  4. Bibasilar pleural effusions which have decreased slightly in extent on   the right side.  5. Mediastinal as well as paraesophageal lymphadenopath slightly more   pronounced.  6. New groundglass opacity within the superior aspect of the residual   right middle lobe. Short interval surveillance in 3-6 months is suggested.

## 2023-12-20 NOTE — PROGRESS NOTE ADULT - SUBJECTIVE AND OBJECTIVE BOX
Patient is a 78y old  Male who presents with a chief complaint of Wheezing (19 Dec 2023 15:38)    Patient seen and examined at bedside.  no acute overnight events    ALLERGIES:  No Known Allergies        Vital Signs Last 24 Hrs  T(F): 96.8 (20 Dec 2023 05:28), Max: 97.7 (19 Dec 2023 22:30)  HR: 64 (20 Dec 2023 05:28) (64 - 68)  BP: 110/63 (20 Dec 2023 05:28) (98/56 - 115/66)  RR: 18 (20 Dec 2023 05:28) (18 - 18)  SpO2: 98% (20 Dec 2023 05:28) (96% - 98%)  I&O's Summary    MEDICATIONS:  acetaminophen     Tablet .. 650 milliGRAM(s) Oral every 6 hours PRN  albuterol/ipratropium for Nebulization 3 milliLiter(s) Nebulizer every 6 hours  aluminum hydroxide/magnesium hydroxide/simethicone Suspension 30 milliLiter(s) Oral every 4 hours PRN  aspirin enteric coated 81 milliGRAM(s) Oral daily  budesonide 160 MICROgram(s)/formoterol 4.5 MICROgram(s) Inhaler 2 Puff(s) Inhalation two times a day  buMETAnide 2 milliGRAM(s) Oral daily  clopidogrel Tablet 75 milliGRAM(s) Oral daily  FLUoxetine 20 milliGRAM(s) Oral <User Schedule>  FLUoxetine 40 milliGRAM(s) Oral <User Schedule>  guaiFENesin ER 1200 milliGRAM(s) Oral every 12 hours  heparin   Injectable 5000 Unit(s) SubCutaneous every 8 hours  melatonin 3 milliGRAM(s) Oral at bedtime PRN  metoprolol succinate ER 25 milliGRAM(s) Oral daily  mirtazapine 7.5 milliGRAM(s) Oral at bedtime  ondansetron Injectable 4 milliGRAM(s) IV Push every 8 hours PRN  predniSONE   Tablet 40 milliGRAM(s) Oral daily  tamsulosin 0.4 milliGRAM(s) Oral at bedtime  temazepam 15 milliGRAM(s) Oral at bedtime PRN      PHYSICAL EXAM:  General: NAD, A/O x 3  ENT: MMM, no thrush  Neck: Supple, No JVD  Lungs: Diffuse Rhonchi, mild crackles, bilateral air entry, non labored  Cardio: S1S2 regular  Abdomen: Soft, Nontender, Nondistended; Bowel sounds present  Extremities: No cyanosis, No edema    LABS:                        10.4   8.93  )-----------( 96       ( 20 Dec 2023 06:00 )             31.2     12-20    129  |  96  |  107  ----------------------------<  154  4.2   |  22  |  1.73    Ca    9.5      20 Dec 2023 06:00    TPro  7.3  /  Alb  2.9  /  TBili  1.3  /  DBili  x   /  AST  51  /  ALT  43  /  AlkPhos  296  12-20          CARDIAC MARKERS ( 19 Dec 2023 07:52 )  x     / 81.4 ng/L / x     / x     / x      CARDIAC MARKERS ( 18 Dec 2023 20:50 )  x     / 79.7 ng/L / x     / x     / x      CARDIAC MARKERS ( 18 Dec 2023 15:55 )  x     / 95.9 ng/L / x     / x     / x                            Urinalysis Basic - ( 20 Dec 2023 06:00 )    Color: x / Appearance: x / SG: x / pH: x  Gluc: 154 mg/dL / Ketone: x  / Bili: x / Urobili: x   Blood: x / Protein: x / Nitrite: x   Leuk Esterase: x / RBC: x / WBC x   Sq Epi: x / Non Sq Epi: x / Bacteria: x            RADIOLOGY & ADDITIONAL TESTS:    Care Discussed with Consultants/Other Providers:

## 2023-12-20 NOTE — PROGRESS NOTE ADULT - SUBJECTIVE AND OBJECTIVE BOX
Feels better today    Vital Signs Last 24 Hrs  T(C): 36.2 (12-20-23 @ 21:30), Max: 36.3 (12-20-23 @ 16:06)  T(F): 97.2 (12-20-23 @ 21:30), Max: 97.3 (12-20-23 @ 16:06)  HR: 82 (12-20-23 @ 21:30) (64 - 82)  BP: 108/47 (12-20-23 @ 21:30) (100/56 - 112/62)  RR: 18 (12-20-23 @ 21:30) (18 - 18)  SpO2: 95% (12-20-23 @ 21:30) (94% - 98%)    s1s2  b/l air entry  soft, ND   sm edema                         10.4   8.93  )-----------( 96       ( 20 Dec 2023 06:00 )             31.2     20 Dec 2023 06:00    129    |  96     |  107    ----------------------------<  154    4.2     |  22     |  1.73     Ca    9.5        20 Dec 2023 06:00    TPro  7.3    /  Alb  2.9    /  TBili  1.3    /  DBili  x      /  AST  51     /  ALT  43     /  AlkPhos  296    20 Dec 2023 06:00    LIVER FUNCTIONS - ( 20 Dec 2023 06:00 )  Alb: 2.9 g/dL / Pro: 7.3 g/dL / ALK PHOS: 296 U/L / ALT: 43 U/L / AST: 51 U/L / GGT: x           acetaminophen     Tablet .. 650 milliGRAM(s) Oral every 6 hours PRN  albuterol/ipratropium for Nebulization 3 milliLiter(s) Nebulizer every 6 hours  aluminum hydroxide/magnesium hydroxide/simethicone Suspension 30 milliLiter(s) Oral every 4 hours PRN  aspirin enteric coated 81 milliGRAM(s) Oral daily  budesonide 160 MICROgram(s)/formoterol 4.5 MICROgram(s) Inhaler 2 Puff(s) Inhalation two times a day  buMETAnide 2 milliGRAM(s) Oral daily  clopidogrel Tablet 75 milliGRAM(s) Oral daily  FLUoxetine 20 milliGRAM(s) Oral <User Schedule>  guaiFENesin ER 1200 milliGRAM(s) Oral every 12 hours  heparin   Injectable 5000 Unit(s) SubCutaneous every 8 hours  melatonin 3 milliGRAM(s) Oral at bedtime PRN  metoprolol succinate ER 25 milliGRAM(s) Oral daily  mirtazapine 7.5 milliGRAM(s) Oral at bedtime  ondansetron Injectable 4 milliGRAM(s) IV Push every 8 hours PRN  predniSONE   Tablet 40 milliGRAM(s) Oral daily  tamsulosin 0.4 milliGRAM(s) Oral at bedtime  temazepam 15 milliGRAM(s) Oral at bedtime PRN    A/P:    CM, EF 20-25%, mod MR, TR  Cardio-renal/hemodynamic BRODY/CKD   Mild hyponatremia iso BRODY, malignancy   UA negative  SONO w/o hydro   Possibly recurrence of lung Ca  Pt is not a candidate for ACE/ARB/Entresto at this time  Continue Bumex   BMP in am  If Na is not improving, will order Tolvaptan   D/w family at bedside     [phone]       Feels better today    Vital Signs Last 24 Hrs  T(C): 36.2 (12-20-23 @ 21:30), Max: 36.3 (12-20-23 @ 16:06)  T(F): 97.2 (12-20-23 @ 21:30), Max: 97.3 (12-20-23 @ 16:06)  HR: 82 (12-20-23 @ 21:30) (64 - 82)  BP: 108/47 (12-20-23 @ 21:30) (100/56 - 112/62)  RR: 18 (12-20-23 @ 21:30) (18 - 18)  SpO2: 95% (12-20-23 @ 21:30) (94% - 98%)    s1s2  b/l air entry  soft, ND   sm edema                         10.4   8.93  )-----------( 96       ( 20 Dec 2023 06:00 )             31.2     20 Dec 2023 06:00    129    |  96     |  107    ----------------------------<  154    4.2     |  22     |  1.73     Ca    9.5        20 Dec 2023 06:00    TPro  7.3    /  Alb  2.9    /  TBili  1.3    /  DBili  x      /  AST  51     /  ALT  43     /  AlkPhos  296    20 Dec 2023 06:00    LIVER FUNCTIONS - ( 20 Dec 2023 06:00 )  Alb: 2.9 g/dL / Pro: 7.3 g/dL / ALK PHOS: 296 U/L / ALT: 43 U/L / AST: 51 U/L / GGT: x           acetaminophen     Tablet .. 650 milliGRAM(s) Oral every 6 hours PRN  albuterol/ipratropium for Nebulization 3 milliLiter(s) Nebulizer every 6 hours  aluminum hydroxide/magnesium hydroxide/simethicone Suspension 30 milliLiter(s) Oral every 4 hours PRN  aspirin enteric coated 81 milliGRAM(s) Oral daily  budesonide 160 MICROgram(s)/formoterol 4.5 MICROgram(s) Inhaler 2 Puff(s) Inhalation two times a day  buMETAnide 2 milliGRAM(s) Oral daily  clopidogrel Tablet 75 milliGRAM(s) Oral daily  FLUoxetine 20 milliGRAM(s) Oral <User Schedule>  guaiFENesin ER 1200 milliGRAM(s) Oral every 12 hours  heparin   Injectable 5000 Unit(s) SubCutaneous every 8 hours  melatonin 3 milliGRAM(s) Oral at bedtime PRN  metoprolol succinate ER 25 milliGRAM(s) Oral daily  mirtazapine 7.5 milliGRAM(s) Oral at bedtime  ondansetron Injectable 4 milliGRAM(s) IV Push every 8 hours PRN  predniSONE   Tablet 40 milliGRAM(s) Oral daily  tamsulosin 0.4 milliGRAM(s) Oral at bedtime  temazepam 15 milliGRAM(s) Oral at bedtime PRN    A/P:    CM, EF 20-25%, mod MR, TR  Cardio-renal/hemodynamic BRODY/CKD   Mild hyponatremia iso BRDOY, malignancy   UA negative  SONO w/o hydro   Possibly recurrence of lung Ca  Pt is not a candidate for ACE/ARB/Entresto at this time  Continue Bumex   BMP in am  If Na is not improving, will order Tolvaptan   D/w family at bedside     [phone]

## 2023-12-20 NOTE — PROGRESS NOTE ADULT - SUBJECTIVE AND OBJECTIVE BOX
Follow-up Pulmonary Progress Note  Chief Complaint : Heart failure    No new events overnight.  Denies SOB/CP.     Allergies :No Known Allergies      PAST MEDICAL & SURGICAL HISTORY:  BPH (benign prostatic hyperplasia)    Bipolar disorder    Depression    Anxiety    Umbilical hernia, incarcerated    Depression    Constipation    Urinary retention    CAD (coronary artery disease)    SCC (squamous cell carcinoma)    Lung mass    Other nonspecific abnormal finding of lung field    LV (left ventricular) mural thrombus    History of inguinal hernia    History of CHF (congestive heart failure)    History of arthroscopy of knee  Right, 1987    History of tonsillectomy  1952    History of hernia repair    H/O coronary angiogram        Medications:  MEDICATIONS  (STANDING):  albuterol/ipratropium for Nebulization 3 milliLiter(s) Nebulizer every 6 hours  aspirin enteric coated 81 milliGRAM(s) Oral daily  budesonide 160 MICROgram(s)/formoterol 4.5 MICROgram(s) Inhaler 2 Puff(s) Inhalation two times a day  buMETAnide 2 milliGRAM(s) Oral daily  clopidogrel Tablet 75 milliGRAM(s) Oral daily  FLUoxetine 20 milliGRAM(s) Oral <User Schedule>  FLUoxetine 40 milliGRAM(s) Oral <User Schedule>  guaiFENesin ER 1200 milliGRAM(s) Oral every 12 hours  heparin   Injectable 5000 Unit(s) SubCutaneous every 8 hours  metoprolol succinate ER 25 milliGRAM(s) Oral daily  mirtazapine 7.5 milliGRAM(s) Oral at bedtime  predniSONE   Tablet 40 milliGRAM(s) Oral daily  tamsulosin 0.4 milliGRAM(s) Oral at bedtime    MEDICATIONS  (PRN):  acetaminophen     Tablet .. 650 milliGRAM(s) Oral every 6 hours PRN Temp greater or equal to 38C (100.4F), Mild Pain (1 - 3)  aluminum hydroxide/magnesium hydroxide/simethicone Suspension 30 milliLiter(s) Oral every 4 hours PRN Dyspepsia  melatonin 3 milliGRAM(s) Oral at bedtime PRN Insomnia  ondansetron Injectable 4 milliGRAM(s) IV Push every 8 hours PRN Nausea and/or Vomiting  temazepam 15 milliGRAM(s) Oral at bedtime PRN Insomnia      Antibiotics History  azithromycin  IVPB    , 12-18-23 @ 23:48  azithromycin  IVPB 500 milliGRAM(s) IV Intermittent every 24 hours, 12-19-23 @ 23:48  azithromycin  IVPB 500 milliGRAM(s) IV Intermittent once, 12-18-23 @ 23:48  cefTRIAXone   IVPB 1000 milliGRAM(s) IV Intermittent every 24 hours, 12-19-23 @ 10:16, Stop order after: 5 Days      Heme Medications   aspirin enteric coated 81 milliGRAM(s) Oral daily, 12-18-23 @ 23:03  clopidogrel Tablet 75 milliGRAM(s) Oral daily, 12-18-23 @ 23:05  heparin   Injectable 5000 Unit(s) SubCutaneous every 8 hours, 12-19-23 @ 10:24      GI Medications  aluminum hydroxide/magnesium hydroxide/simethicone Suspension 30 milliLiter(s) Oral every 4 hours, 12-18-23 @ 23:44, Routine PRN    LABS:                        10.4   8.93  )-----------( 96       ( 20 Dec 2023 06:00 )             31.2     12-20    129<L>  |  96  |  107<H>  ----------------------------<  154<H>  4.2   |  22  |  1.73<H>    Ca    9.5      20 Dec 2023 06:00    TPro  7.3  /  Alb  2.9<L>  /  TBili  1.3<H>  /  DBili  x   /  AST  51<H>  /  ALT  43  /  AlkPhos  296<H>  12-20      Urinalysis Basic - ( 20 Dec 2023 06:00 )    Color: x / Appearance: x / SG: x / pH: x  Gluc: 154 mg/dL / Ketone: x  / Bili: x / Urobili: x   Blood: x / Protein: x / Nitrite: x   Leuk Esterase: x / RBC: x / WBC x   Sq Epi: x / Non Sq Epi: x / Bacteria: x    CULTURES: (if applicable)    Rapid RVP Result: Detected (12-18-23 @ 15:55)    VITALS:  T(C): 36 (12-20-23 @ 05:28), Max: 36.5 (12-19-23 @ 22:30)  T(F): 96.8 (12-20-23 @ 05:28), Max: 97.7 (12-19-23 @ 22:30)  HR: 64 (12-20-23 @ 05:28) (64 - 68)  BP: 110/63 (12-20-23 @ 05:28) (98/56 - 115/66)  BP(mean): --  ABP: --  ABP(mean): --  RR: 18 (12-20-23 @ 05:28) (18 - 18)  SpO2: 98% (12-20-23 @ 09:03) (96% - 98%)  CVP(mm Hg): --  CVP(cm H2O): --    Ins and Outs       Height (cm): 182.9 (12-18-23 @ 15:16)  Weight (kg): 82.1 (12-18-23 @ 15:16)  BMI (kg/m2): 24.5 (12-18-23 @ 15:16)        I&O's Detail

## 2023-12-20 NOTE — PROGRESS NOTE ADULT - PROBLEM SELECTOR PLAN 1
Bilateral lung adenocarcinoma. CT showing posterior right upper lobe lobectomy, small large airways inflammation more pronounced compared to prior imaging, a nodular lesion within the apical posterior segment of the left upper lobe, appearing more nodular in configuration, measuring 3.2 x 2.0 cm.  Findings concerning for left upper lobe neoplasm. Will need work up in ambulatory.  Awaiting work up to r/o viral infection. On prophylactic antibiotics.   Will monitor CBC. Case d/w with patient's wife, at bedside.

## 2023-12-20 NOTE — PROGRESS NOTE ADULT - ASSESSMENT
78 year old male with a PMHX of CAD, lung adenocarcinoma s/p RUL lobectomy 09/2022, CHFrEF, HTN, HLD, depression complicated by insomnia presenting to  ED for 4 days of worsening wheezing being admitted for severe bronchitis 2/2 to RSV infection and course complicated by BRODY and hyponatremia     Acute bronchitis 2/2 acute RSV infection  Lung Cancer, left upper lobe, increasing in size on CT  - CT Chest significant for new onset small large airways inflammation, also with RML GGO concerning for PNA vs malignancy, more likely malignancy  -Pulmonary following, maintain supplemental oxygen as needed, wean as tolerated  -Continue oral steroids, prednisone 40, supportive care for RSV  -Continue nebulizers  -Will need outpatient PET scan again to follow up lesion    Chronic Systolic CHF  Elevated Tn  Cardiology appreciated, Tn 95>79>81  Continue bumex, appears to be on GDMT at home (bumex, farxiga, spirinolactone, metoprolol, entresto)  TTE 12/19 shows EF 25-30%, severe global left ventricle hypokinesis        #Troponinemia  - Trop 95.9>79.7->81.4  - likely 2/2 to demand ischemia in setting of viral infection  -no active chest pain    #BRODY  -Cr 2.29 (1.4 02/2023)   -likely pre-renal from dehydration given >BUN/Cr  -avoid nephrotoxic agents (holding Losartan)  -renal US to eval hydronephrosis   -renal consult - awaiting recs     #Hyponatremia   -check urine Na and osm to eval for SIADH  -monitor Na for now  -renal consulted - awaiting recs    #CHFrEF  - unlikely to be acute exacerbation of HF; patient appears clinically dry  - no changes in weight, no cough, no orthopnea  - patient takes Bumex 2mg BID and spironolactone at home. Restarted bumex per pulm recs.   - TTE requested for AM  - cardiology consult requested    #Lung Adenocarcinoma  - RUL Lobectomy 09/2022 s/p radiation tx  - HARRY now concerning for neoplasm with mediastinal/paraesophageal lymphadenopathy more pronounced  - Hem/Onc consult requested. Likely will need outpatient follow up.     #CAD  -asa and plavix    #DVT PPx   -HSQ 78 year old male with a PMHX of CAD, lung adenocarcinoma s/p RUL lobectomy 09/2022, CHFrEF, HTN, HLD, depression complicated by insomnia presenting to  ED for 4 days of worsening wheezing being admitted for severe bronchitis 2/2 to RSV infection and course complicated by BRODY and hyponatremia     Acute bronchitis 2/2 acute RSV infection  Lung Cancer, left upper lobe, increasing in size on CT  - CT Chest significant for new onset small large airways inflammation, also with RML GGO concerning for PNA vs malignancy, more likely malignancy  -Pulmonary following, maintain supplemental oxygen as needed, wean as tolerated  -Continue oral steroids, prednisone 40, supportive care for RSV  -Continue nebulizers  -Will need outpatient PET scan again to follow up lesion    Chronic Systolic CHF  Elevated Tn  Cardiology appreciated, Tn 95>79>81  Continue bumex, appears to be on GDMT at home (bumex, farxiga, spirinolactone, metoprolol, entresto)  TTE 12/19 shows EF 25-30%, severe global left ventricle hypokinesis        #Troponinemia  - Trop 95.9>79.7->81.4  - likely 2/2 to demand ischemia in setting of viral infection  -no active chest pain    #BRODY  -Cr 2.29 (1.4 02/2023)   -likely pre-renal from dehydration given >BUN/Cr  -avoid nephrotoxic agents (holding Losartan)  -renal US to eval hydronephrosis   -renal consult - awaiting recs     #Hyponatremia   -check urine Na and osm to eval for SIADH  -monitor Na for now  -renal consulted - awaiting recs    #CHFrEF  - unlikely to be acute exacerbation of HF; patient appears clinically dry  - no changes in weight, no cough, no orthopnea  - patient takes Bumex 2mg BID and spironolactone at home. Restarted bumex per pulm recs.   - TTE requested for AM  - cardiology consult requested    #Lung Adenocarcinoma  - RUL Lobectomy 09/2022 s/p radiation tx  - HARRY now concerning for neoplasm with mediastinal/paraesophageal lymphadenopathy more pronounced  - Hem/Onc consult requested. Likely will need outpatient follow up.     #CAD  -asa and plavix    #DVT PPx   -HSQ    [phone] 78 year old male with a PMHX of CAD, lung adenocarcinoma s/p RUL lobectomy 09/2022, CHFrEF, HTN, HLD, depression complicated by insomnia presenting to  ED for 4 days of worsening wheezing being admitted for severe bronchitis 2/2 to RSV infection and course complicated by BRODY and hyponatremia     Acute bronchitis 2/2 acute RSV infection  Lung Cancer, left upper lobe, increasing in size on CT  - CT Chest significant for new onset small large airways inflammation, also with RML GGO concerning for PNA vs malignancy, more likely malignancy  -Pulmonary following, maintain supplemental oxygen as needed, wean as tolerated  -Continue oral steroids, prednisone 40, supportive care for RSV  -Continue nebulizers  -Will need outpatient PET scan again to follow up lesion    Chronic Systolic CHF  Elevated Tn  Cardiology appreciated, Tn 95>79>81  Continue bumex, appears to be on GDMT at home (bumex, farxiga, spirinolactone, metoprolol, entresto)  TTE 12/19 shows EF 25-30%, severe global left ventricle hypokinesis        #Troponinemia  - Trop 95.9>79.7->81.4  - likely 2/2 to demand ischemia in setting of viral infection  -no active chest pain    #BRODY  -Cr 2.29 (1.4 02/2023)   -likely pre-renal from dehydration given >BUN/Cr  -avoid nephrotoxic agents (holding Losartan)  -renal US to eval hydronephrosis   -renal consult - awaiting recs     #Hyponatremia   -check urine Na and osm to eval for SIADH  -monitor Na for now  -renal consulted - awaiting recs    #CHFrEF  - unlikely to be acute exacerbation of HF; patient appears clinically dry  - no changes in weight, no cough, no orthopnea  - patient takes Bumex 2mg BID and spironolactone at home. Restarted bumex per pulm recs.   - TTE requested for AM  - cardiology consult requested    #Lung Adenocarcinoma  - RUL Lobectomy 09/2022 s/p radiation tx  - HARRY now concerning for neoplasm with mediastinal/paraesophageal lymphadenopathy more pronounced  - Hem/Onc consult requested. Likely will need outpatient follow up.     #CAD  -asa and plavix    #DVT PPx   -HSQ    [phone] 78 year old male with a PMHX of CAD, lung adenocarcinoma s/p RUL lobectomy 09/2022, CHFrEF, HTN, HLD, depression complicated by insomnia presenting to  ED for 4 days of worsening wheezing being admitted for severe bronchitis 2/2 to RSV infection and course complicated by BRODY and hyponatremia     Acute bronchitis 2/2 acute RSV infection  Lung Cancer, left upper lobe, increasing in size on CT  - CT Chest significant for new onset small large airways inflammation, also nodular lesion left upper lobe concerning for neoplasm  -Pulmonary following, maintain supplemental oxygen as needed, wean as tolerated  -Pt with hx of RUL lobectomy 9/22 status post radiation therapy, HARRY now with increased paraesophageal lymphadenopathy  -Continue oral steroids, prednisone 40, supportive care for RSV  -Continue nebulizers  -Will need outpatient PET scan again to follow up lesion    Chronic Systolic CHF  Elevated Tn  -Cardiology appreciated, Tn 95>79>81, ECG c/w sinus, LAD, old anterior infarct, similar to prior  -Tn elevated have peaked, no signs of ACS, likely due to renal dysfunction and underlying viral syndrome  -Continue bumex, appears to be on GDMT at home (bumex, farxiga, spirinolactone, metoprolol, entresto)  -TTE 12/19 shows EF 25-30%, severe global left ventricle hypokinesis, Severely enlarged RA, mod MR  -pt appears euvolemic, not in acute decompensated CHF, resume home meds bumex and metoprolol  -Hold ACE/AB, entresto due to BRODY  -Further follow up for GDMT as outpatient with cardiologist    Cardio-Renal/Hemodynamic BRODY/CKD  Hyponatremia in s/o BRODY and probable malignancy  -renal following, UA negative, renal sono   -possible recurrent lung Ca, not a candidate for ACE/ARB/Entresto at this time  -continue bumex, follow BMP    CAD  -asa and plavix    DVT PPx   -HSQ    [phone] 78 year old male with a PMHX of CAD, lung adenocarcinoma s/p RUL lobectomy 09/2022, CHFrEF, HTN, HLD, depression complicated by insomnia presenting to  ED for 4 days of worsening wheezing being admitted for severe bronchitis 2/2 to RSV infection and course complicated by BRODY and hyponatremia     Acute bronchitis 2/2 acute RSV infection  Lung Cancer, left upper lobe, increasing in size on CT  - CT Chest significant for new onset small large airways inflammation, also nodular lesion left upper lobe concerning for neoplasm  -Pulmonary following, maintain supplemental oxygen as needed, wean as tolerated  -Pt with hx of RUL lobectomy 9/22 status post radiation therapy, HARRY now with increased paraesophageal lymphadenopathy  -Continue oral steroids, prednisone 40, supportive care for RSV  -Continue nebulizers  -Will need outpatient PET scan again to follow up lesion    Chronic Systolic CHF  Elevated Tn  -Cardiology appreciated, Tn 95>79>81, ECG c/w sinus, LAD, old anterior infarct, similar to prior  -Tn elevated have peaked, no signs of ACS, likely due to renal dysfunction and underlying viral syndrome  -Continue bumex, appears to be on GDMT at home (bumex, farxiga, spirinolactone, metoprolol, entresto)  -TTE 12/19 shows EF 25-30%, severe global left ventricle hypokinesis, Severely enlarged RA, mod MR  -pt appears euvolemic, not in acute decompensated CHF, resume home meds bumex and metoprolol  -Hold ACE/AB, entresto due to BRODY  -Further follow up for GDMT as outpatient with cardiologist    Cardio-Renal/Hemodynamic BRODY/CKD  Hyponatremia in s/o BRODY and probable malignancy  -renal following, UA negative, renal sono   -possible recurrent lung Ca, not a candidate for ACE/ARB/Entresto at this time  -continue bumex, follow BMP    CAD  -asa and plavix    DVT PPx   -HSQ    [phone]

## 2023-12-20 NOTE — PROGRESS NOTE ADULT - ASSESSMENT
Physical Examination:  GENERAL:               Alert, Oriented, No acute distress.    HEENT:                 No JVD, Moist MM  PULM:                     Bilateral air entry, no significant sputum production, No Rales, No Rhonchi, No Wheezing  CVS:                         S1, S2,  + Murmur  ABD:                        Soft, nondistended, nontender, normoactive bowel sounds,   EXT:                         Trace edema, nontender, No Cyanosis or Clubbing   Vascular:                Warm Extremities, Normal Capillary refill, Normal Distal Pulses  SKIN:                       Warm and well perfused, no rashes noted.   NEURO:                  Alert, oriented, interactive, nonfocal, follows commands  PSYC:                      Calm, + Insight.    Assessment  Acute Respiratory distress due to RSV    as per history patient wheezing and no longer wheezing  left upper lobe Lung cancer s/p radiation, increasing in size on CT  Chronic Systolic CHF    Underlying BPH, Bipolar d/o, Depression, Anxiety, CAD (coronary artery disease)      Plan  Off oxygen   Supportive care for RSV  Taper steroids   Nebs as needed  never smoker doubt copd, albuterol PRN   Maintain   will need out patient PET scan again to f/u lesion, patient had one 8/8/23 but CT scan showing slight increase in size  heme onc f/u as out patient

## 2023-12-21 ENCOUNTER — TRANSCRIPTION ENCOUNTER (OUTPATIENT)
Age: 78
End: 2023-12-21

## 2023-12-21 VITALS — OXYGEN SATURATION: 98 %

## 2023-12-21 LAB
ALBUMIN SERPL ELPH-MCNC: 2.9 G/DL — LOW (ref 3.3–5)
ALBUMIN SERPL ELPH-MCNC: 2.9 G/DL — LOW (ref 3.3–5)
ALP SERPL-CCNC: 290 U/L — HIGH (ref 40–120)
ALP SERPL-CCNC: 290 U/L — HIGH (ref 40–120)
ALT FLD-CCNC: 48 U/L — HIGH (ref 10–45)
ALT FLD-CCNC: 48 U/L — HIGH (ref 10–45)
ANION GAP SERPL CALC-SCNC: 10 MMOL/L — SIGNIFICANT CHANGE UP (ref 5–17)
ANION GAP SERPL CALC-SCNC: 10 MMOL/L — SIGNIFICANT CHANGE UP (ref 5–17)
AST SERPL-CCNC: 54 U/L — HIGH (ref 10–40)
AST SERPL-CCNC: 54 U/L — HIGH (ref 10–40)
BILIRUB SERPL-MCNC: 1.3 MG/DL — HIGH (ref 0.2–1.2)
BILIRUB SERPL-MCNC: 1.3 MG/DL — HIGH (ref 0.2–1.2)
BUN SERPL-MCNC: 88 MG/DL — HIGH (ref 7–23)
BUN SERPL-MCNC: 88 MG/DL — HIGH (ref 7–23)
CALCIUM SERPL-MCNC: 9.3 MG/DL — SIGNIFICANT CHANGE UP (ref 8.4–10.5)
CALCIUM SERPL-MCNC: 9.3 MG/DL — SIGNIFICANT CHANGE UP (ref 8.4–10.5)
CHLORIDE SERPL-SCNC: 100 MMOL/L — SIGNIFICANT CHANGE UP (ref 96–108)
CHLORIDE SERPL-SCNC: 100 MMOL/L — SIGNIFICANT CHANGE UP (ref 96–108)
CO2 SERPL-SCNC: 25 MMOL/L — SIGNIFICANT CHANGE UP (ref 22–31)
CO2 SERPL-SCNC: 25 MMOL/L — SIGNIFICANT CHANGE UP (ref 22–31)
CREAT SERPL-MCNC: 1.51 MG/DL — HIGH (ref 0.5–1.3)
CREAT SERPL-MCNC: 1.51 MG/DL — HIGH (ref 0.5–1.3)
EGFR: 47 ML/MIN/1.73M2 — LOW
EGFR: 47 ML/MIN/1.73M2 — LOW
GLUCOSE SERPL-MCNC: 101 MG/DL — HIGH (ref 70–99)
GLUCOSE SERPL-MCNC: 101 MG/DL — HIGH (ref 70–99)
HCT VFR BLD CALC: 30.8 % — LOW (ref 39–50)
HCT VFR BLD CALC: 30.8 % — LOW (ref 39–50)
HGB BLD-MCNC: 10.3 G/DL — LOW (ref 13–17)
HGB BLD-MCNC: 10.3 G/DL — LOW (ref 13–17)
MCHC RBC-ENTMCNC: 32.3 PG — SIGNIFICANT CHANGE UP (ref 27–34)
MCHC RBC-ENTMCNC: 32.3 PG — SIGNIFICANT CHANGE UP (ref 27–34)
MCHC RBC-ENTMCNC: 33.4 GM/DL — SIGNIFICANT CHANGE UP (ref 32–36)
MCHC RBC-ENTMCNC: 33.4 GM/DL — SIGNIFICANT CHANGE UP (ref 32–36)
MCV RBC AUTO: 96.6 FL — SIGNIFICANT CHANGE UP (ref 80–100)
MCV RBC AUTO: 96.6 FL — SIGNIFICANT CHANGE UP (ref 80–100)
NRBC # BLD: 0 /100 WBCS — SIGNIFICANT CHANGE UP (ref 0–0)
NRBC # BLD: 0 /100 WBCS — SIGNIFICANT CHANGE UP (ref 0–0)
PLATELET # BLD AUTO: 106 K/UL — LOW (ref 150–400)
PLATELET # BLD AUTO: 106 K/UL — LOW (ref 150–400)
POTASSIUM SERPL-MCNC: 4.3 MMOL/L — SIGNIFICANT CHANGE UP (ref 3.5–5.3)
POTASSIUM SERPL-MCNC: 4.3 MMOL/L — SIGNIFICANT CHANGE UP (ref 3.5–5.3)
POTASSIUM SERPL-SCNC: 4.3 MMOL/L — SIGNIFICANT CHANGE UP (ref 3.5–5.3)
POTASSIUM SERPL-SCNC: 4.3 MMOL/L — SIGNIFICANT CHANGE UP (ref 3.5–5.3)
PROT SERPL-MCNC: 7.2 G/DL — SIGNIFICANT CHANGE UP (ref 6–8.3)
PROT SERPL-MCNC: 7.2 G/DL — SIGNIFICANT CHANGE UP (ref 6–8.3)
RBC # BLD: 3.19 M/UL — LOW (ref 4.2–5.8)
RBC # BLD: 3.19 M/UL — LOW (ref 4.2–5.8)
RBC # FLD: 17.3 % — HIGH (ref 10.3–14.5)
RBC # FLD: 17.3 % — HIGH (ref 10.3–14.5)
SODIUM SERPL-SCNC: 135 MMOL/L — SIGNIFICANT CHANGE UP (ref 135–145)
SODIUM SERPL-SCNC: 135 MMOL/L — SIGNIFICANT CHANGE UP (ref 135–145)
TSH SERPL-MCNC: 1.06 UIU/ML — SIGNIFICANT CHANGE UP (ref 0.36–3.74)
TSH SERPL-MCNC: 1.06 UIU/ML — SIGNIFICANT CHANGE UP (ref 0.36–3.74)
WBC # BLD: 7.63 K/UL — SIGNIFICANT CHANGE UP (ref 3.8–10.5)
WBC # BLD: 7.63 K/UL — SIGNIFICANT CHANGE UP (ref 3.8–10.5)
WBC # FLD AUTO: 7.63 K/UL — SIGNIFICANT CHANGE UP (ref 3.8–10.5)
WBC # FLD AUTO: 7.63 K/UL — SIGNIFICANT CHANGE UP (ref 3.8–10.5)

## 2023-12-21 PROCEDURE — 85027 COMPLETE CBC AUTOMATED: CPT

## 2023-12-21 PROCEDURE — 71250 CT THORAX DX C-: CPT | Mod: MA

## 2023-12-21 PROCEDURE — 85025 COMPLETE CBC W/AUTO DIFF WBC: CPT

## 2023-12-21 PROCEDURE — 99285 EMERGENCY DEPT VISIT HI MDM: CPT

## 2023-12-21 PROCEDURE — 76775 US EXAM ABDO BACK WALL LIM: CPT

## 2023-12-21 PROCEDURE — 84443 ASSAY THYROID STIM HORMONE: CPT

## 2023-12-21 PROCEDURE — 83935 ASSAY OF URINE OSMOLALITY: CPT

## 2023-12-21 PROCEDURE — 93005 ELECTROCARDIOGRAM TRACING: CPT

## 2023-12-21 PROCEDURE — 0225U NFCT DS DNA&RNA 21 SARSCOV2: CPT

## 2023-12-21 PROCEDURE — 83880 ASSAY OF NATRIURETIC PEPTIDE: CPT

## 2023-12-21 PROCEDURE — 94760 N-INVAS EAR/PLS OXIMETRY 1: CPT

## 2023-12-21 PROCEDURE — 80053 COMPREHEN METABOLIC PANEL: CPT

## 2023-12-21 PROCEDURE — 99239 HOSP IP/OBS DSCHRG MGMT >30: CPT

## 2023-12-21 PROCEDURE — 84300 ASSAY OF URINE SODIUM: CPT

## 2023-12-21 PROCEDURE — 71045 X-RAY EXAM CHEST 1 VIEW: CPT

## 2023-12-21 PROCEDURE — 84484 ASSAY OF TROPONIN QUANT: CPT

## 2023-12-21 PROCEDURE — 94640 AIRWAY INHALATION TREATMENT: CPT

## 2023-12-21 PROCEDURE — 97162 PT EVAL MOD COMPLEX 30 MIN: CPT

## 2023-12-21 PROCEDURE — 93306 TTE W/DOPPLER COMPLETE: CPT

## 2023-12-21 PROCEDURE — 36415 COLL VENOUS BLD VENIPUNCTURE: CPT

## 2023-12-21 RX ORDER — LOSARTAN POTASSIUM 100 MG/1
0.5 TABLET, FILM COATED ORAL
Refills: 0 | DISCHARGE

## 2023-12-21 RX ADMIN — HEPARIN SODIUM 5000 UNIT(S): 5000 INJECTION INTRAVENOUS; SUBCUTANEOUS at 06:48

## 2023-12-21 RX ADMIN — Medication 81 MILLIGRAM(S): at 12:13

## 2023-12-21 RX ADMIN — Medication 20 MILLIGRAM(S): at 10:06

## 2023-12-21 RX ADMIN — Medication 25 MILLIGRAM(S): at 06:47

## 2023-12-21 RX ADMIN — BUDESONIDE AND FORMOTEROL FUMARATE DIHYDRATE 2 PUFF(S): 160; 4.5 AEROSOL RESPIRATORY (INHALATION) at 08:54

## 2023-12-21 RX ADMIN — Medication 3 MILLILITER(S): at 08:54

## 2023-12-21 RX ADMIN — CLOPIDOGREL BISULFATE 75 MILLIGRAM(S): 75 TABLET, FILM COATED ORAL at 12:13

## 2023-12-21 RX ADMIN — Medication 1200 MILLIGRAM(S): at 06:47

## 2023-12-21 RX ADMIN — Medication 40 MILLIGRAM(S): at 07:39

## 2023-12-21 NOTE — PROGRESS NOTE ADULT - PROVIDER SPECIALTY LIST ADULT
Cardiology
Nephrology
Nephrology
Pulmonology
Pulmonology
Heme/Onc
Hospitalist

## 2023-12-21 NOTE — DISCHARGE NOTE PROVIDER - NSDCMRMEDTOKEN_GEN_ALL_CORE_FT
aspirin 81 mg oral capsule: 1 cap(s) orally once a day   Bumex 2 mg oral tablet: 1 tab(s) orally 2 times a day  Flomax 0.4 mg oral capsule: 1 cap(s) orally once a day  Metoprolol Succinate ER 25 mg oral tablet, extended release: 1 tab(s) orally every 12 hours  mirtazapine 15 mg oral tablet: 2 tab(s) orally once a day (at bedtime)   Plavix 75 mg oral tablet: 1 tab(s) orally once a day   predniSONE 10 mg oral tablet: 3 tab(s) orally once a day 30 mg oral x 2 days, then 20 mg oral x 2 days, then 10 mg oral x 2 days, then stop  PROzac 20 mg oral capsule: 1 cap(s) orally every other day  PROzac 40 mg oral capsule: 1 cap(s) orally every other day  Restoril 15 mg oral capsule: 1 cap(s) orally once a day (at bedtime) as needed for  insomnia  rosuvastatin 20 mg oral tablet: 1 tab(s) orally once a day (at bedtime)

## 2023-12-21 NOTE — DISCHARGE NOTE PROVIDER - CARE PROVIDER_API CALL
Delroy Boston.  Family Medicine  [address] [place] [id number]  Phone: [phone]  Fax: [phone]  Follow Up Time:     Nicholas Espinal  Critical Care Medicine  [address] [place] [id number]  Phone: [phone]  Fax: [phone]  Follow Up Time:    Delroy Boston.  Family Medicine  [address] [place] [id number]  Phone: [phone]  Fax: [phone]  Follow Up Time:     Nicholas Espinal  Critical Care Medicine  [address] [place] [id number]  Phone: [phone]  Fax: [phone]  Follow Up Time:

## 2023-12-21 NOTE — DISCHARGE NOTE NURSING/CASE MANAGEMENT/SOCIAL WORK - NSDCPEFALRISK_GEN_ALL_CORE
For information on Fall & Injury Prevention, visit: [email or web address] OR  [email or web address] OR  https://www.cdc.gov/steadi/patient.html For information on Fall & Injury Prevention, visit: [email or web address] OR  [email or web address] OR  https://www.cdc.gov/steadi/patient.html

## 2023-12-21 NOTE — PROGRESS NOTE ADULT - NS ATTEND AMEND GEN_ALL_CORE FT
As we discussed, we did not find the exact cause of your symptoms today in the emergency department. Therefore, it is important for you to be reevaluated by your primary care doctor. Please return with any fevers, weakness, bowel or bladder problems, worsening symptoms, or additional concerns.
Patient is medically stable. Can go home with home PT
Patient to ambulate and OOB. Will continu nebulizer treatment. Possible D/C home in 24hrs

## 2023-12-21 NOTE — PROGRESS NOTE ADULT - SUBJECTIVE AND OBJECTIVE BOX
Patient is a 78y old  Male who presents with a chief complaint of Wheezing (20 Dec 2023 23:26)    Patient seen and examined at bedside.  no acute overnight events    ALLERGIES:  No Known Allergies        Vital Signs Last 24 Hrs  T(F): 97.5 (21 Dec 2023 05:31), Max: 97.5 (21 Dec 2023 05:31)  HR: 80 (21 Dec 2023 05:31) (70 - 82)  BP: 107/61 (21 Dec 2023 05:31) (100/56 - 112/62)  RR: 18 (21 Dec 2023 05:31) (18 - 18)  SpO2: 96% (21 Dec 2023 05:31) (94% - 98%)  I&O's Summary    20 Dec 2023 07:01  -  21 Dec 2023 07:00  --------------------------------------------------------  IN: 720 mL / OUT: 0 mL / NET: 720 mL      MEDICATIONS:  acetaminophen     Tablet .. 650 milliGRAM(s) Oral every 6 hours PRN  albuterol/ipratropium for Nebulization 3 milliLiter(s) Nebulizer every 6 hours  aluminum hydroxide/magnesium hydroxide/simethicone Suspension 30 milliLiter(s) Oral every 4 hours PRN  aspirin enteric coated 81 milliGRAM(s) Oral daily  budesonide 160 MICROgram(s)/formoterol 4.5 MICROgram(s) Inhaler 2 Puff(s) Inhalation two times a day  buMETAnide 2 milliGRAM(s) Oral daily  clopidogrel Tablet 75 milliGRAM(s) Oral daily  FLUoxetine 20 milliGRAM(s) Oral <User Schedule>  guaiFENesin ER 1200 milliGRAM(s) Oral every 12 hours  heparin   Injectable 5000 Unit(s) SubCutaneous every 8 hours  melatonin 3 milliGRAM(s) Oral at bedtime PRN  metoprolol succinate ER 25 milliGRAM(s) Oral daily  mirtazapine 7.5 milliGRAM(s) Oral at bedtime  ondansetron Injectable 4 milliGRAM(s) IV Push every 8 hours PRN  predniSONE   Tablet 40 milliGRAM(s) Oral daily  tamsulosin 0.4 milliGRAM(s) Oral at bedtime  temazepam 15 milliGRAM(s) Oral at bedtime PRN      PHYSICAL EXAM:  General: NAD, A/O x 3  ENT: MMM, no thrush  Neck: Supple, No JVD  Lungs: Mostly clear, non labored, good inspiratory effort  Cardio: RRR, S1/S2, No murmurs  Abdomen: Soft, Nontender, Nondistended; Bowel sounds present  Extremities: No cyanosis, No edema    LABS:                        10.3   7.63  )-----------( 106      ( 21 Dec 2023 07:01 )             30.8     12-21    135  |  100  |  88  ----------------------------<  101  4.3   |  25  |  1.51    Ca    9.3      21 Dec 2023 07:01    TPro  7.2  /  Alb  2.9  /  TBili  1.3  /  DBili  x   /  AST  54  /  ALT  48  /  AlkPhos  290  12-21          CARDIAC MARKERS ( 19 Dec 2023 07:52 )  x     / 81.4 ng/L / x     / x     / x      CARDIAC MARKERS ( 18 Dec 2023 20:50 )  x     / 79.7 ng/L / x     / x     / x      CARDIAC MARKERS ( 18 Dec 2023 15:55 )  x     / 95.9 ng/L / x     / x     / x            TSH 1.060   TSH with FT4 reflex --  Total T3 --                  Urinalysis Basic - ( 21 Dec 2023 07:01 )    Color: x / Appearance: x / SG: x / pH: x  Gluc: 101 mg/dL / Ketone: x  / Bili: x / Urobili: x   Blood: x / Protein: x / Nitrite: x   Leuk Esterase: x / RBC: x / WBC x   Sq Epi: x / Non Sq Epi: x / Bacteria: x            RADIOLOGY & ADDITIONAL TESTS:    Care Discussed with Consultants/Other Providers:

## 2023-12-21 NOTE — PROGRESS NOTE ADULT - ASSESSMENT
78 year old male with a PMHX of CAD, lung adenocarcinoma s/p RUL lobectomy 09/2022, CHFrEF, HTN, HLD, depression complicated by insomnia presenting to  ED for 4 days of worsening wheezing being admitted for severe bronchitis 2/2 to RSV infection and course complicated by BRODY and hyponatremia     Acute bronchitis 2/2 acute RSV infection  Lung Cancer, left upper lobe, increasing in size on CT  - CT Chest significant for new onset small large airways inflammation, also nodular lesion left upper lobe concerning for neoplasm  -Pulmonary following, now off supplemental oxygen, stable respiratory status  -Pt with hx of RUL lobectomy 9/22 status post radiation therapy, HARRY now with increased paraesophageal lymphadenopathy  -Continue oral steroids, taper steroids, supportive care for RSV  -Continue nebulizers as needed  -Will need outpatient PET scan again to follow up lesion  Anticipate medical clearance today, dc planning home    Chronic Systolic CHF  Elevated Tn  -Cardiology appreciated, Tn 95>79>81, ECG c/w sinus, LAD, old anterior infarct, similar to prior  -Tn elevated have peaked, no signs of ACS, likely due to renal dysfunction and underlying viral syndrome  -Continue bumex, appears to be on GDMT at home (bumex, farxiga, spirinolactone, metoprolol, entresto)  -TTE 12/19 shows EF 25-30%, severe global left ventricle hypokinesis, Severely enlarged RA, mod MR  -pt appears euvolemic, not in acute decompensated CHF, resume home meds bumex and metoprolol  -Hold ACE/AB, entresto due to BRODY  -Further follow up for GDMT as outpatient with cardiologist    Cardio-Renal/Hemodynamic BRODY/CKD  Hyponatremia in s/o BRODY and probable malignancy  -renal following, UA negative, renal sono w/o evidence of hydronephrosis  -possible recurrent lung Ca, not a candidate for ACE/ARB/Entresto at this time  -continue bumex, follow BMP  hyponatremia resolved, serum Na wnl today, Cr improving    CAD  -asa and plavix    DVT PPx   -HSQ    [phone] 78 year old male with a PMHX of CAD, lung adenocarcinoma s/p RUL lobectomy 09/2022, CHFrEF, HTN, HLD, depression complicated by insomnia presenting to  ED for 4 days of worsening wheezing being admitted for severe bronchitis 2/2 to RSV infection and course complicated by BRODY and hyponatremia     Acute bronchitis 2/2 acute RSV infection  Lung Cancer, left upper lobe, increasing in size on CT  - CT Chest significant for new onset small large airways inflammation, also nodular lesion left upper lobe concerning for neoplasm  -Pulmonary following, now off supplemental oxygen, stable respiratory status  -Pt with hx of RUL lobectomy 9/22 status post radiation therapy, HARRY now with increased paraesophageal lymphadenopathy  -Continue oral steroids, taper steroids, supportive care for RSV  -Continue nebulizers as needed  -Will need outpatient PET scan again to follow up lesion  Anticipate medical clearance today, dc planning home    Chronic Systolic CHF  Elevated Tn  -Cardiology appreciated, Tn 95>79>81, ECG c/w sinus, LAD, old anterior infarct, similar to prior  -Tn elevated have peaked, no signs of ACS, likely due to renal dysfunction and underlying viral syndrome  -Continue bumex, appears to be on GDMT at home (bumex, farxiga, spirinolactone, metoprolol, entresto)  -TTE 12/19 shows EF 25-30%, severe global left ventricle hypokinesis, Severely enlarged RA, mod MR  -pt appears euvolemic, not in acute decompensated CHF, resume home meds bumex and metoprolol  -Hold ACE/AB, entresto due to BRODY  -Further follow up for GDMT as outpatient with cardiologist    Cardio-Renal/Hemodynamic BRODY/CKD  Hyponatremia in s/o BRODY and probable malignancy  -renal following, UA negative, renal sono w/o evidence of hydronephrosis  -possible recurrent lung Ca, not a candidate for ACE/ARB/Entresto at this time  -continue bumex, follow BMP  hyponatremia resolved, serum Na wnl today, Cr improving    CAD  -asa and plavix    DVT PPx   -HSQ    [phone]

## 2023-12-21 NOTE — DISCHARGE NOTE PROVIDER - ATTENDING DISCHARGE PHYSICAL EXAMINATION:
GENERAL: NAD  HEART: Regular rate and rhythm; No murmurs, rubs, or gallops  PSYCH: Appropriate affect, Alert & Oriented x 3

## 2023-12-21 NOTE — DISCHARGE NOTE NURSING/CASE MANAGEMENT/SOCIAL WORK - NSDCPETBCESMAN_GEN_ALL_CORE
Individual Psychotherapy (PhD/LCSW)    8/5/2022    Site:  [facility]         If you are a smoker, it is important for your health to stop smoking. Please be aware that second hand smoke is also harmful.

## 2023-12-21 NOTE — DISCHARGE NOTE NURSING/CASE MANAGEMENT/SOCIAL WORK - NSDCFUADDAPPT_GEN_ALL_CORE_FT
Follow up appointment with Dr. Boston [phone] on 12/29/2023 @ 8:30am Follow up appointment with Dr. Boston [phone] on 12/29/2023 @ 8:30am

## 2023-12-21 NOTE — DISCHARGE NOTE NURSING/CASE MANAGEMENT/SOCIAL WORK - NSDCFUADDAPPT_GEN_ALL_CORE_FT
Tested the atrial lead. Please call to schedule appointments with primary care doctor, cardiology, and [facility].

## 2023-12-21 NOTE — DISCHARGE NOTE PROVIDER - NSDCFUSCHEDAPPT_GEN_ALL_CORE_FT
[facility]  [facility]  Scheduled Appointment: 12/27/2023    [facility]  CATSCAN [address]  Scheduled Appointment: 12/27/2023    Delroy Boston  [facility]  [facility] [address]  Scheduled Appointment: 12/29/2023    Mayelin Burden  [facility]  [facility]  Scheduled Appointment: 01/02/2024    Que Alejandre  [facility]  RADMED [address]  Scheduled Appointment: 01/03/2024    Jay Shell  [facility]  HEARTFAIL [address]  Scheduled Appointment: 01/04/2024    Rogelio Lockett  [facility]  OTOLARYNG [address]  Scheduled Appointment: 01/10/2024     [facility]  [facility]  Scheduled Appointment: 12/27/2023    [facility]  CATSCAN [address]  Scheduled Appointment: 12/27/2023    Delroy Boston  [facility]  [facility] [address]  Scheduled Appointment: 12/29/2023    Mayelin Burden  [facility]  [facility]  Scheduled Appointment: 01/02/2024    Que Alejandre  [facility]  RADMED [address]  Scheduled Appointment: 01/03/2024    Jay Shell  [facility]  HEARTFAIL [address]  Scheduled Appointment: 01/04/2024    Rogelio Lockett  [facility]  OTOLARYNG [address]  Scheduled Appointment: 01/10/2024     [facility]  [facility]  Scheduled Appointment: 12/27/2023    [facility]  CATSCAN [address]  Scheduled Appointment: 12/27/2023    Delroy Boston  [facility]  [facility] [address]  Scheduled Appointment: 12/29/2023    Delroy Boston  [facility]  [facility] [address]  Scheduled Appointment: 12/29/2023    Mayelin Burden  [facility]  [facility]  Scheduled Appointment: 01/02/2024    Que Alejandre  [facility]  RADMED [address]  Scheduled Appointment: 01/03/2024    Jay Shell  [facility]  HEARTFAIL [address]  Scheduled Appointment: 01/04/2024    Rogelio Lockett  [facility]  OT[address]ARYNG [address]  Scheduled Appointment: 01/10/2024     [facility]  [facility]  Scheduled Appointment: 12/27/2023    [facility]  CATSCAN [address]  Scheduled Appointment: 12/27/2023    Delroy Boston  [facility]  [facility] [address]  Scheduled Appointment: 12/29/2023    Delroy Boston  [facility]  [facility] [address]  Scheduled Appointment: 12/29/2023    Mayelin Burden  [facility]  [facility]  Scheduled Appointment: 01/02/2024    Que Alejandre  [facility]  RADMED [address]  Scheduled Appointment: 01/03/2024    Jay Shell  [facility]  HEARTFAIL [address]  Scheduled Appointment: 01/04/2024    Rogelio Lockett  [facility]  OT[address]ARYNG [address]  Scheduled Appointment: 01/10/2024

## 2023-12-21 NOTE — DISCHARGE NOTE PROVIDER - HOSPITAL COURSE
Hospital Course  Patient is a 78 year old male with a PMHX of CAD, lung adenocarcinoma s/p RUL lobectomy 09/2022, CHFrEF, HTN, HLD, depression complicated by insomnia presenting to  ED for 4 days of worsening wheezing. He reports he went out in the cold without a jacket last week and subsequently developed a cold followed by audible wheezing. He reports no cough, sore throat, rhinorrhea, earache, headache, sputum production. Patient also reports he has been monitoring his daily weights and has been 181 lbs which is his baseline. He denies any chest pain, palpitations, worsening LE edema, orthopnea.     In the ED patient's temp was 98.1F, HR was 58, BP was 87/59. His troponin notably was 95.9>79.7. He is saturating well on RA.     Patient admitted to the hospital on 12/18 with worsening wheezing found with acute RSV infection, severe bronchitis.  Imaging during admission showed new onset large airway inflammation, also a nodular lesion left upper lobe concerning for neoplasm with associated increased paraesophageal lymphadenopathy.  Pulmonology followed closely, managed with supplemental oxygen as needed, course of steroids, nebulizers.  Pt has hx of chronic systolic CHF, troponin were found to be elevated on admission.  Cardiology saw the patient.  No signs of decompensated CHF, elvated troponin likely dur to reanl dysfunction and acute viral infection.  Pt clinically improved, now off oxygen requirements.  Pt appears to be on GDMT at home for CHF (bumex, spirinolactone, metoprolol, Entrest).  Recommend further follow up with outpatient cardiologist, not a good candidate for ACE/ARB/entresto due to renal insufficiency.  Recommend continuing bumex and metoprolol. Recommend outpatient PET scan to follow up lesion.     Source of Infection:  Antibiotic / Last Day: N/A    Palliative Care / Advanced Care Planning  Code Status: full code  Patient/Family agreeable to Hospice/Palliative (Y/N)?  Summary of Goals of Care Conversation:    Discharging Provider:  Dell Hugo NP  Contact Info: Cell [phone] - Please call with any questions or concerns.    Outpatient Provider: Dr. Boston           Hospital Course  Patient is a 78 year old male with a PMHX of CAD, lung adenocarcinoma s/p RUL lobectomy 09/2022, CHFrEF, HTN, HLD, depression complicated by insomnia presenting to  ED for 4 days of worsening wheezing. He reports he went out in the cold without a jacket last week and subsequently developed a cold followed by audible wheezing. He reports no cough, sore throat, rhinorrhea, earache, headache, sputum production. Patient also reports he has been monitoring his daily weights and has been 181 lbs which is his baseline. He denies any chest pain, palpitations, worsening LE edema, orthopnea.     In the ED patient's temp was 98.1F, HR was 58, BP was 87/59. His troponin notably was 95.9>79.7. He is saturating well on RA.     Patient admitted to the hospital on 12/18 with worsening wheezing found with acute RSV infection, severe bronchitis.  Imaging during admission showed new onset large airway inflammation, also a nodular lesion left upper lobe concerning for neoplasm with associated increased paraesophageal lymphadenopathy.  Pulmonology followed closely, managed with supplemental oxygen as needed, course of steroids, nebulizers.  Pt has hx of chronic systolic CHF, troponin were found to be elevated on admission.  Cardiology saw the patient.  No signs of decompensated CHF, elvated troponin likely dur to reanl dysfunction and acute viral infection.  Pt clinically improved, now off oxygen requirements.  Pt appears to be on GDMT at home for CHF (bumex, spirinolactone, metoprolol, Entrest).  Recommend further follow up with outpatient cardiologist, not a good candidate for ACE/ARB/entresto due to renal insufficiency.  Recommend continuing bumex and metoprolol. Recommend outpatient PET scan to follow up lesion.     Source of Infection:  Antibiotic / Last Day: N/A    Palliative Care / Advanced Care Planning  Code Status: full code  Patient/Family agreeable to Hospice/Palliative (Y/N)?  Summary of Goals of Care Conversation:    Discharging Provider:  Dell Hugo NP  Contact Info: Cell [phone] - Please call with any questions or concerns.    Outpatient Provider: Dr. Boston

## 2023-12-21 NOTE — PROGRESS NOTE ADULT - ASSESSMENT
Physical Examination:  GENERAL:               Alert, Oriented, No acute distress.    HEENT:                   No JVD, Moist MM  PULM:                     Bilateral air entry, No Rales, No Rhonchi, No Wheezing  CVS:                         S1, S2,  + Murmur  ABD:                        Soft, nondistended, nontender, normoactive bowel sounds,   EXT:                         Trace edema, nontender, No Cyanosis or Clubbing   Vascular:                 Warm Extremities, Normal Capillary refill, Normal Distal Pulses  SKIN:                       Warm and well perfused, no rashes noted.   NEURO:                  Alert, oriented, interactive, nonfocal, follows commands  PSYC:                      Calm, + Insight.    Assessment  Acute Respiratory distress due to RSV    as per history patient wheezing and no longer wheezing  left upper lobe Lung cancer s/p radiation, increasing in size on CT  Chronic Systolic CHF    Underlying BPH, Bipolar d/o, Depression, Anxiety, CAD (coronary artery disease)    Plan  Off oxygen   Supportive care for RSV  Tapering steroids dose  Nebs as needed  never smoker doubt copd, albuterol PRN   will need out patient PET scan again to f/u lesion, patient had one 8/8/23 but CT scan showing slight increase in size  heme onc f/u as out patient

## 2023-12-21 NOTE — DISCHARGE NOTE PROVIDER - CARE PROVIDERS DIRECT ADDRESSES
[email or web address],DirectAddress_Unknown [email or web address],DirectAddress_Unknown

## 2023-12-21 NOTE — DISCHARGE NOTE PROVIDER - PROVIDER TOKENS
PROVIDER:[TOKEN:[[id number]]],PROVIDER:[TOKEN:[[id number]]] PROVIDER:[TOKEN:[[id number]]],PROVIDER:[TOKEN:[[id number]]]

## 2023-12-21 NOTE — PROGRESS NOTE ADULT - SUBJECTIVE AND OBJECTIVE BOX
No distress    Vital Signs Last 24 Hrs  T(C): 36.4 (12-21-23 @ 05:31), Max: 36.4 (12-21-23 @ 05:31)  T(F): 97.5 (12-21-23 @ 05:31), Max: 97.5 (12-21-23 @ 05:31)  HR: 80 (12-21-23 @ 05:31) (80 - 82)  BP: 107/61 (12-21-23 @ 05:31) (107/61 - 108/47)  RR: 18 (12-21-23 @ 05:31) (18 - 18)  SpO2: 98% (12-21-23 @ 09:05) (95% - 98%)    s1s2  b/l air entry  soft, ND   sm edema                                 10.3   7.63  )-----------( 106      ( 21 Dec 2023 07:01 )             30.8     21 Dec 2023 07:01    135    |  100    |  88     ----------------------------<  101    4.3     |  25     |  1.51     Ca    9.3        21 Dec 2023 07:01    TPro  7.2    /  Alb  2.9    /  TBili  1.3    /  DBili  x      /  AST  54     /  ALT  48     /  AlkPhos  290    21 Dec 2023 07:01    LIVER FUNCTIONS - ( 21 Dec 2023 07:01 )  Alb: 2.9 g/dL / Pro: 7.2 g/dL / ALK PHOS: 290 U/L / ALT: 48 U/L / AST: 54 U/L / GGT: x           acetaminophen     Tablet .. 650 milliGRAM(s) Oral every 6 hours PRN  albuterol/ipratropium for Nebulization 3 milliLiter(s) Nebulizer every 6 hours  aluminum hydroxide/magnesium hydroxide/simethicone Suspension 30 milliLiter(s) Oral every 4 hours PRN  aspirin enteric coated 81 milliGRAM(s) Oral daily  budesonide 160 MICROgram(s)/formoterol 4.5 MICROgram(s) Inhaler 2 Puff(s) Inhalation two times a day  buMETAnide 2 milliGRAM(s) Oral daily  clopidogrel Tablet 75 milliGRAM(s) Oral daily  FLUoxetine 20 milliGRAM(s) Oral <User Schedule>  guaiFENesin ER 1200 milliGRAM(s) Oral every 12 hours  heparin   Injectable 5000 Unit(s) SubCutaneous every 8 hours  melatonin 3 milliGRAM(s) Oral at bedtime PRN  metoprolol succinate ER 25 milliGRAM(s) Oral daily  mirtazapine 7.5 milliGRAM(s) Oral at bedtime  ondansetron Injectable 4 milliGRAM(s) IV Push every 8 hours PRN  predniSONE   Tablet 40 milliGRAM(s) Oral daily  tamsulosin 0.4 milliGRAM(s) Oral at bedtime  temazepam 15 milliGRAM(s) Oral at bedtime PRN    A/P:    CM, EF 20-25%, mod MR, TR  Cardio-renal/hemodynamic BRODY/CKD   Mild hyponatremia iso BRODY, possible malignancy   Improved   UA negative  SONO w/o hydro   Cr is improving   Continue Bumex   Cards, Pulm, renal f/u as op  Avoid nephrotoxins, no NSAID's     [phone]       No distress    Vital Signs Last 24 Hrs  T(C): 36.4 (12-21-23 @ 05:31), Max: 36.4 (12-21-23 @ 05:31)  T(F): 97.5 (12-21-23 @ 05:31), Max: 97.5 (12-21-23 @ 05:31)  HR: 80 (12-21-23 @ 05:31) (80 - 82)  BP: 107/61 (12-21-23 @ 05:31) (107/61 - 108/47)  RR: 18 (12-21-23 @ 05:31) (18 - 18)  SpO2: 98% (12-21-23 @ 09:05) (95% - 98%)    s1s2  b/l air entry  soft, ND   sm edema                                 10.3   7.63  )-----------( 106      ( 21 Dec 2023 07:01 )             30.8     21 Dec 2023 07:01    135    |  100    |  88     ----------------------------<  101    4.3     |  25     |  1.51     Ca    9.3        21 Dec 2023 07:01    TPro  7.2    /  Alb  2.9    /  TBili  1.3    /  DBili  x      /  AST  54     /  ALT  48     /  AlkPhos  290    21 Dec 2023 07:01    LIVER FUNCTIONS - ( 21 Dec 2023 07:01 )  Alb: 2.9 g/dL / Pro: 7.2 g/dL / ALK PHOS: 290 U/L / ALT: 48 U/L / AST: 54 U/L / GGT: x           acetaminophen     Tablet .. 650 milliGRAM(s) Oral every 6 hours PRN  albuterol/ipratropium for Nebulization 3 milliLiter(s) Nebulizer every 6 hours  aluminum hydroxide/magnesium hydroxide/simethicone Suspension 30 milliLiter(s) Oral every 4 hours PRN  aspirin enteric coated 81 milliGRAM(s) Oral daily  budesonide 160 MICROgram(s)/formoterol 4.5 MICROgram(s) Inhaler 2 Puff(s) Inhalation two times a day  buMETAnide 2 milliGRAM(s) Oral daily  clopidogrel Tablet 75 milliGRAM(s) Oral daily  FLUoxetine 20 milliGRAM(s) Oral <User Schedule>  guaiFENesin ER 1200 milliGRAM(s) Oral every 12 hours  heparin   Injectable 5000 Unit(s) SubCutaneous every 8 hours  melatonin 3 milliGRAM(s) Oral at bedtime PRN  metoprolol succinate ER 25 milliGRAM(s) Oral daily  mirtazapine 7.5 milliGRAM(s) Oral at bedtime  ondansetron Injectable 4 milliGRAM(s) IV Push every 8 hours PRN  predniSONE   Tablet 40 milliGRAM(s) Oral daily  tamsulosin 0.4 milliGRAM(s) Oral at bedtime  temazepam 15 milliGRAM(s) Oral at bedtime PRN    A/P:    CM, EF 20-25%, mod MR, TR  Cardio-renal/hemodynamic BRODY/CKD   Mild hyponatremia iso BRODY, possible malignancy   Improved   UA negative  SONO w/o hydro   Cr is improving   Continue Bumex   Cards, Pulm, renal f/u as op  Avoid nephrotoxins, no NSAID's     [phone]

## 2023-12-21 NOTE — PROGRESS NOTE ADULT - SUBJECTIVE AND OBJECTIVE BOX
Follow-up Pulmonary Progress Note  Chief Complaint : Heart failure    Comfortable, not in distress. Feels better.     Allergies :No Known Allergies      PAST MEDICAL & SURGICAL HISTORY:  BPH (benign prostatic hyperplasia)    Bipolar disorder    Depression    Anxiety    Umbilical hernia, incarcerated    Depression    Constipation    Urinary retention    CAD (coronary artery disease)    SCC (squamous cell carcinoma)    Lung mass    Other nonspecific abnormal finding of lung field    LV (left ventricular) mural thrombus    History of inguinal hernia    History of CHF (congestive heart failure)    History of arthroscopy of knee  Right, 1987    History of tonsillectomy  1952    History of hernia repair    H/O coronary angiogram        Medications:  MEDICATIONS  (STANDING):  albuterol/ipratropium for Nebulization 3 milliLiter(s) Nebulizer every 6 hours  aspirin enteric coated 81 milliGRAM(s) Oral daily  budesonide 160 MICROgram(s)/formoterol 4.5 MICROgram(s) Inhaler 2 Puff(s) Inhalation two times a day  buMETAnide 2 milliGRAM(s) Oral daily  clopidogrel Tablet 75 milliGRAM(s) Oral daily  FLUoxetine 20 milliGRAM(s) Oral <User Schedule>  guaiFENesin ER 1200 milliGRAM(s) Oral every 12 hours  heparin   Injectable 5000 Unit(s) SubCutaneous every 8 hours  metoprolol succinate ER 25 milliGRAM(s) Oral daily  mirtazapine 7.5 milliGRAM(s) Oral at bedtime  predniSONE   Tablet 40 milliGRAM(s) Oral daily  tamsulosin 0.4 milliGRAM(s) Oral at bedtime    MEDICATIONS  (PRN):  acetaminophen     Tablet .. 650 milliGRAM(s) Oral every 6 hours PRN Temp greater or equal to 38C (100.4F), Mild Pain (1 - 3)  aluminum hydroxide/magnesium hydroxide/simethicone Suspension 30 milliLiter(s) Oral every 4 hours PRN Dyspepsia  melatonin 3 milliGRAM(s) Oral at bedtime PRN Insomnia  ondansetron Injectable 4 milliGRAM(s) IV Push every 8 hours PRN Nausea and/or Vomiting  temazepam 15 milliGRAM(s) Oral at bedtime PRN Insomnia      Antibiotics History  azithromycin  IVPB 500 milliGRAM(s) IV Intermittent once, 12-18-23 @ 23:48  azithromycin  IVPB    , 12-18-23 @ 23:48  azithromycin  IVPB 500 milliGRAM(s) IV Intermittent every 24 hours, 12-19-23 @ 23:48  cefTRIAXone   IVPB 1000 milliGRAM(s) IV Intermittent every 24 hours, 12-19-23 @ 10:16, Stop order after: 5 Days      Heme Medications   aspirin enteric coated 81 milliGRAM(s) Oral daily, 12-18-23 @ 23:03  clopidogrel Tablet 75 milliGRAM(s) Oral daily, 12-18-23 @ 23:05  heparin   Injectable 5000 Unit(s) SubCutaneous every 8 hours, 12-19-23 @ 10:24      GI Medications  aluminum hydroxide/magnesium hydroxide/simethicone Suspension 30 milliLiter(s) Oral every 4 hours, 12-18-23 @ 23:44, Routine PRN        LABS:                        10.3   7.63  )-----------( 106      ( 21 Dec 2023 07:01 )             30.8     12-21    135  |  100  |  88<H>  ----------------------------<  101<H>  4.3   |  25  |  1.51<H>    Ca    9.3      21 Dec 2023 07:01    TPro  7.2  /  Alb  2.9<L>  /  TBili  1.3<H>  /  DBili  x   /  AST  54<H>  /  ALT  48<H>  /  AlkPhos  290<H>  12-21              Urinalysis Basic - ( 21 Dec 2023 07:01 )    Color: x / Appearance: x / SG: x / pH: x  Gluc: 101 mg/dL / Ketone: x  / Bili: x / Urobili: x   Blood: x / Protein: x / Nitrite: x   Leuk Esterase: x / RBC: x / WBC x   Sq Epi: x / Non Sq Epi: x / Bacteria: x              CULTURES: (if applicable)    Rapid RVP Result: Detected (12-18-23 @ 15:55)        CAPILLARY BLOOD GLUCOSE          RADIOLOGY  CXR:      CT:    ECHO:      VITALS:  T(C): 36.4 (12-21-23 @ 05:31), Max: 36.4 (12-21-23 @ 05:31)  T(F): 97.5 (12-21-23 @ 05:31), Max: 97.5 (12-21-23 @ 05:31)  HR: 80 (12-21-23 @ 05:31) (70 - 82)  BP: 107/61 (12-21-23 @ 05:31) (100/56 - 112/62)  BP(mean): --  ABP: --  ABP(mean): --  RR: 18 (12-21-23 @ 05:31) (18 - 18)  SpO2: 98% (12-21-23 @ 09:05) (94% - 98%)  CVP(mm Hg): --  CVP(cm H2O): --    Ins and Outs     12-20-23 @ 07:01  -  12-21-23 @ 07:00  --------------------------------------------------------  IN: 720 mL / OUT: 0 mL / NET: 720 mL    12-21-23 @ 07:01  -  12-21-23 @ 11:41  --------------------------------------------------------  IN: 760 mL / OUT: 0 mL / NET: 760 mL        Height (cm): 182.9 (12-18-23 @ 15:16)  Weight (kg): 82.1 (12-18-23 @ 15:16)  BMI (kg/m2): 24.5 (12-18-23 @ 15:16)        I&O's Detail    20 Dec 2023 07:01  -  21 Dec 2023 07:00  --------------------------------------------------------  IN:    Oral Fluid: 720 mL  Total IN: 720 mL    OUT:  Total OUT: 0 mL    Total NET: 720 mL      21 Dec 2023 07:01  -  21 Dec 2023 11:41  --------------------------------------------------------  IN:    Oral Fluid: 760 mL  Total IN: 760 mL    OUT:  Total OUT: 0 mL    Total NET: 760 mL

## 2023-12-21 NOTE — DISCHARGE NOTE NURSING/CASE MANAGEMENT/SOCIAL WORK - PATIENT PORTAL LINK FT
You can access the FollowMyHealth Patient Portal offered by [facility] by registering at the following website: [email or web address]. By joining [employer]’s FollowMyHealth portal, you will also be able to view your health information using other applications (apps) compatible with our system. You can access the FollowMyHealth Patient Portal offered by [facility] by registering at the following website: [email or web address]. By joining [employer]’s FollowMyHealth portal, you will also be able to view your health information using other applications (apps) compatible with our system.

## 2023-12-21 NOTE — DISCHARGE NOTE NURSING/CASE MANAGEMENT/SOCIAL WORK - NSDCVIVACCINE_GEN_ALL_CORE_FT
COVID-19, mRNA, LNP-S, PF, 100 mcg/ 0.5 mL dose (Moderna); 17-May-2022 14:07; Pankaj Velazco (RN); [employer] [id number] (Exp. Date: 05-Jun-2022); IntraMuscular; Deltoid Left.; 0.25 milliLiter(s);    COVID-19, mRNA, LNP-S, PF, 100 mcg/ 0.5 mL dose (Modern[employer]); 17-May-2022 14:07; Pankaj Velazco (RN); [employer] [id number] (Exp. Date: 05-Jun-2022); IntraMuscular; Deltoid Left.; 0.25 milliLiter(s);

## 2023-12-22 ENCOUNTER — INPATIENT (INPATIENT)
Facility: HOSPITAL | Age: 78
LOS: 0 days | Discharge: HOME | DRG: 153 | End: 2023-12-23
Attending: HOSPITALIST | Admitting: HOSPITALIST
Payer: COMMERCIAL

## 2023-12-22 VITALS
HEART RATE: 61 BPM | DIASTOLIC BLOOD PRESSURE: 69 MMHG | HEIGHT: 72 IN | TEMPERATURE: 98 F | WEIGHT: 184.97 LBS | RESPIRATION RATE: 18 BRPM | OXYGEN SATURATION: 97 % | SYSTOLIC BLOOD PRESSURE: 109 MMHG

## 2023-12-22 DIAGNOSIS — J18.9 PNEUMONIA, UNSPECIFIED ORGANISM: ICD-10-CM

## 2023-12-22 DIAGNOSIS — Z98.890 OTHER SPECIFIED POSTPROCEDURAL STATES: Chronic | ICD-10-CM

## 2023-12-22 DIAGNOSIS — R35.0 FREQUENCY OF MICTURITION: ICD-10-CM

## 2023-12-22 LAB
ALBUMIN SERPL ELPH-MCNC: 3 G/DL — LOW (ref 3.3–5)
ALBUMIN SERPL ELPH-MCNC: 3 G/DL — LOW (ref 3.3–5)
ALP SERPL-CCNC: 295 U/L — HIGH (ref 40–120)
ALP SERPL-CCNC: 295 U/L — HIGH (ref 40–120)
ALT FLD-CCNC: 84 U/L — HIGH (ref 10–45)
ALT FLD-CCNC: 84 U/L — HIGH (ref 10–45)
ANION GAP SERPL CALC-SCNC: 13 MMOL/L — SIGNIFICANT CHANGE UP (ref 5–17)
ANION GAP SERPL CALC-SCNC: 13 MMOL/L — SIGNIFICANT CHANGE UP (ref 5–17)
APTT BLD: 29.7 SEC — SIGNIFICANT CHANGE UP (ref 24.5–35.6)
APTT BLD: 29.7 SEC — SIGNIFICANT CHANGE UP (ref 24.5–35.6)
AST SERPL-CCNC: 85 U/L — HIGH (ref 10–40)
AST SERPL-CCNC: 85 U/L — HIGH (ref 10–40)
BASOPHILS # BLD AUTO: 0.04 K/UL — SIGNIFICANT CHANGE UP (ref 0–0.2)
BASOPHILS # BLD AUTO: 0.04 K/UL — SIGNIFICANT CHANGE UP (ref 0–0.2)
BASOPHILS NFR BLD AUTO: 0.5 % — SIGNIFICANT CHANGE UP (ref 0–2)
BASOPHILS NFR BLD AUTO: 0.5 % — SIGNIFICANT CHANGE UP (ref 0–2)
BILIRUB SERPL-MCNC: 2.1 MG/DL — HIGH (ref 0.2–1.2)
BILIRUB SERPL-MCNC: 2.1 MG/DL — HIGH (ref 0.2–1.2)
BUN SERPL-MCNC: 89 MG/DL — HIGH (ref 7–23)
BUN SERPL-MCNC: 89 MG/DL — HIGH (ref 7–23)
CALCIUM SERPL-MCNC: 9 MG/DL — SIGNIFICANT CHANGE UP (ref 8.4–10.5)
CALCIUM SERPL-MCNC: 9 MG/DL — SIGNIFICANT CHANGE UP (ref 8.4–10.5)
CHLORIDE SERPL-SCNC: 98 MMOL/L — SIGNIFICANT CHANGE UP (ref 96–108)
CHLORIDE SERPL-SCNC: 98 MMOL/L — SIGNIFICANT CHANGE UP (ref 96–108)
CO2 SERPL-SCNC: 22 MMOL/L — SIGNIFICANT CHANGE UP (ref 22–31)
CO2 SERPL-SCNC: 22 MMOL/L — SIGNIFICANT CHANGE UP (ref 22–31)
CREAT SERPL-MCNC: 1.65 MG/DL — HIGH (ref 0.5–1.3)
CREAT SERPL-MCNC: 1.65 MG/DL — HIGH (ref 0.5–1.3)
D DIMER BLD IA.RAPID-MCNC: 444 NG/ML DDU — HIGH
D DIMER BLD IA.RAPID-MCNC: 444 NG/ML DDU — HIGH
EGFR: 42 ML/MIN/1.73M2 — LOW
EGFR: 42 ML/MIN/1.73M2 — LOW
EOSINOPHIL # BLD AUTO: 0.01 K/UL — SIGNIFICANT CHANGE UP (ref 0–0.5)
EOSINOPHIL # BLD AUTO: 0.01 K/UL — SIGNIFICANT CHANGE UP (ref 0–0.5)
EOSINOPHIL NFR BLD AUTO: 0.1 % — SIGNIFICANT CHANGE UP (ref 0–6)
EOSINOPHIL NFR BLD AUTO: 0.1 % — SIGNIFICANT CHANGE UP (ref 0–6)
GLUCOSE SERPL-MCNC: 131 MG/DL — HIGH (ref 70–99)
GLUCOSE SERPL-MCNC: 131 MG/DL — HIGH (ref 70–99)
HCT VFR BLD CALC: 34.7 % — LOW (ref 39–50)
HCT VFR BLD CALC: 34.7 % — LOW (ref 39–50)
HGB BLD-MCNC: 11.7 G/DL — LOW (ref 13–17)
HGB BLD-MCNC: 11.7 G/DL — LOW (ref 13–17)
IMM GRANULOCYTES NFR BLD AUTO: 2.5 % — HIGH (ref 0–0.9)
IMM GRANULOCYTES NFR BLD AUTO: 2.5 % — HIGH (ref 0–0.9)
INR BLD: 1.12 RATIO — SIGNIFICANT CHANGE UP (ref 0.85–1.18)
INR BLD: 1.12 RATIO — SIGNIFICANT CHANGE UP (ref 0.85–1.18)
LYMPHOCYTES # BLD AUTO: 0.95 K/UL — LOW (ref 1–3.3)
LYMPHOCYTES # BLD AUTO: 0.95 K/UL — LOW (ref 1–3.3)
LYMPHOCYTES # BLD AUTO: 10.8 % — LOW (ref 13–44)
LYMPHOCYTES # BLD AUTO: 10.8 % — LOW (ref 13–44)
MCHC RBC-ENTMCNC: 32.1 PG — SIGNIFICANT CHANGE UP (ref 27–34)
MCHC RBC-ENTMCNC: 32.1 PG — SIGNIFICANT CHANGE UP (ref 27–34)
MCHC RBC-ENTMCNC: 33.7 GM/DL — SIGNIFICANT CHANGE UP (ref 32–36)
MCHC RBC-ENTMCNC: 33.7 GM/DL — SIGNIFICANT CHANGE UP (ref 32–36)
MCV RBC AUTO: 95.1 FL — SIGNIFICANT CHANGE UP (ref 80–100)
MCV RBC AUTO: 95.1 FL — SIGNIFICANT CHANGE UP (ref 80–100)
MONOCYTES # BLD AUTO: 0.85 K/UL — SIGNIFICANT CHANGE UP (ref 0–0.9)
MONOCYTES # BLD AUTO: 0.85 K/UL — SIGNIFICANT CHANGE UP (ref 0–0.9)
MONOCYTES NFR BLD AUTO: 9.7 % — SIGNIFICANT CHANGE UP (ref 2–14)
MONOCYTES NFR BLD AUTO: 9.7 % — SIGNIFICANT CHANGE UP (ref 2–14)
NEUTROPHILS # BLD AUTO: 6.7 K/UL — SIGNIFICANT CHANGE UP (ref 1.8–7.4)
NEUTROPHILS # BLD AUTO: 6.7 K/UL — SIGNIFICANT CHANGE UP (ref 1.8–7.4)
NEUTROPHILS NFR BLD AUTO: 76.4 % — SIGNIFICANT CHANGE UP (ref 43–77)
NEUTROPHILS NFR BLD AUTO: 76.4 % — SIGNIFICANT CHANGE UP (ref 43–77)
NRBC # BLD: 0 /100 WBCS — SIGNIFICANT CHANGE UP (ref 0–0)
NRBC # BLD: 0 /100 WBCS — SIGNIFICANT CHANGE UP (ref 0–0)
NT-PROBNP SERPL-SCNC: HIGH PG/ML (ref 0–300)
NT-PROBNP SERPL-SCNC: HIGH PG/ML (ref 0–300)
PLATELET # BLD AUTO: 153 K/UL — SIGNIFICANT CHANGE UP (ref 150–400)
PLATELET # BLD AUTO: 153 K/UL — SIGNIFICANT CHANGE UP (ref 150–400)
POTASSIUM SERPL-MCNC: 4.5 MMOL/L — SIGNIFICANT CHANGE UP (ref 3.5–5.3)
POTASSIUM SERPL-MCNC: 4.5 MMOL/L — SIGNIFICANT CHANGE UP (ref 3.5–5.3)
POTASSIUM SERPL-SCNC: 4.5 MMOL/L — SIGNIFICANT CHANGE UP (ref 3.5–5.3)
POTASSIUM SERPL-SCNC: 4.5 MMOL/L — SIGNIFICANT CHANGE UP (ref 3.5–5.3)
PROT SERPL-MCNC: 7.5 G/DL — SIGNIFICANT CHANGE UP (ref 6–8.3)
PROT SERPL-MCNC: 7.5 G/DL — SIGNIFICANT CHANGE UP (ref 6–8.3)
PROTHROM AB SERPL-ACNC: 12.7 SEC — SIGNIFICANT CHANGE UP (ref 9.5–13)
PROTHROM AB SERPL-ACNC: 12.7 SEC — SIGNIFICANT CHANGE UP (ref 9.5–13)
RAPID RVP RESULT: DETECTED
RAPID RVP RESULT: DETECTED
RBC # BLD: 3.65 M/UL — LOW (ref 4.2–5.8)
RBC # BLD: 3.65 M/UL — LOW (ref 4.2–5.8)
RBC # FLD: 17.7 % — HIGH (ref 10.3–14.5)
RBC # FLD: 17.7 % — HIGH (ref 10.3–14.5)
RSV RNA SPEC QL NAA+PROBE: DETECTED
RSV RNA SPEC QL NAA+PROBE: DETECTED
SARS-COV-2 RNA SPEC QL NAA+PROBE: SIGNIFICANT CHANGE UP
SARS-COV-2 RNA SPEC QL NAA+PROBE: SIGNIFICANT CHANGE UP
SODIUM SERPL-SCNC: 133 MMOL/L — LOW (ref 135–145)
SODIUM SERPL-SCNC: 133 MMOL/L — LOW (ref 135–145)
TROPONIN I, HIGH SENSITIVITY RESULT: 85.8 NG/L — HIGH
TROPONIN I, HIGH SENSITIVITY RESULT: 85.8 NG/L — HIGH
TROPONIN I, HIGH SENSITIVITY RESULT: 91.4 NG/L — HIGH
TROPONIN I, HIGH SENSITIVITY RESULT: 91.4 NG/L — HIGH
WBC # BLD: 8.77 K/UL — SIGNIFICANT CHANGE UP (ref 3.8–10.5)
WBC # BLD: 8.77 K/UL — SIGNIFICANT CHANGE UP (ref 3.8–10.5)
WBC # FLD AUTO: 8.77 K/UL — SIGNIFICANT CHANGE UP (ref 3.8–10.5)
WBC # FLD AUTO: 8.77 K/UL — SIGNIFICANT CHANGE UP (ref 3.8–10.5)

## 2023-12-22 PROCEDURE — 99285 EMERGENCY DEPT VISIT HI MDM: CPT

## 2023-12-22 PROCEDURE — 99223 1ST HOSP IP/OBS HIGH 75: CPT

## 2023-12-22 PROCEDURE — 71045 X-RAY EXAM CHEST 1 VIEW: CPT | Mod: 26,77

## 2023-12-22 PROCEDURE — 93010 ELECTROCARDIOGRAM REPORT: CPT

## 2023-12-22 PROCEDURE — 71045 X-RAY EXAM CHEST 1 VIEW: CPT | Mod: 26

## 2023-12-22 RX ORDER — MIRTAZAPINE 45 MG/1
30 TABLET, ORALLY DISINTEGRATING ORAL AT BEDTIME
Refills: 0 | Status: DISCONTINUED | OUTPATIENT
Start: 2023-12-22 | End: 2023-12-23

## 2023-12-22 RX ORDER — FLUOXETINE HYDROCHLORIDE 20 MG/1
20 CAPSULE ORAL
Refills: 0 | Status: COMPLETED | COMMUNITY
Start: 2023-11-16 | End: 2023-12-22

## 2023-12-22 RX ORDER — PIPERACILLIN AND TAZOBACTAM 4; .5 G/20ML; G/20ML
3.38 INJECTION, POWDER, LYOPHILIZED, FOR SOLUTION INTRAVENOUS ONCE
Refills: 0 | Status: COMPLETED | OUTPATIENT
Start: 2023-12-22 | End: 2023-12-22

## 2023-12-22 RX ORDER — IPRATROPIUM/ALBUTEROL SULFATE 18-103MCG
3 AEROSOL WITH ADAPTER (GRAM) INHALATION ONCE
Refills: 0 | Status: COMPLETED | OUTPATIENT
Start: 2023-12-22 | End: 2023-12-22

## 2023-12-22 RX ORDER — TAMSULOSIN HYDROCHLORIDE 0.4 MG/1
0.4 CAPSULE ORAL AT BEDTIME
Refills: 0 | Status: DISCONTINUED | OUTPATIENT
Start: 2023-12-22 | End: 2023-12-22

## 2023-12-22 RX ORDER — LOSARTAN POTASSIUM 25 MG/1
25 TABLET, FILM COATED ORAL
Qty: 90 | Refills: 2 | Status: DISCONTINUED | COMMUNITY
Start: 2023-08-21 | End: 2023-12-22

## 2023-12-22 RX ORDER — SODIUM CHLORIDE 0.65 %
0.65 AEROSOL, SPRAY (ML) NASAL TWICE DAILY
Qty: 1 | Refills: 3 | Status: COMPLETED | COMMUNITY
Start: 2023-02-15 | End: 2023-12-22

## 2023-12-22 RX ORDER — LANOLIN ALCOHOL/MO/W.PET/CERES
3 CREAM (GRAM) TOPICAL AT BEDTIME
Refills: 0 | Status: DISCONTINUED | OUTPATIENT
Start: 2023-12-22 | End: 2023-12-23

## 2023-12-22 RX ORDER — PIPERACILLIN AND TAZOBACTAM 4; .5 G/20ML; G/20ML
3.38 INJECTION, POWDER, LYOPHILIZED, FOR SOLUTION INTRAVENOUS EVERY 8 HOURS
Refills: 0 | Status: DISCONTINUED | OUTPATIENT
Start: 2023-12-22 | End: 2023-12-23

## 2023-12-22 RX ORDER — TAMSULOSIN HYDROCHLORIDE 0.4 MG/1
0.4 CAPSULE ORAL
Refills: 0 | Status: COMPLETED | COMMUNITY
Start: 2022-06-06 | End: 2023-12-22

## 2023-12-22 RX ORDER — ACETAMINOPHEN 500 MG
650 TABLET ORAL EVERY 6 HOURS
Refills: 0 | Status: DISCONTINUED | OUTPATIENT
Start: 2023-12-22 | End: 2023-12-23

## 2023-12-22 RX ORDER — ASPIRIN/CALCIUM CARB/MAGNESIUM 324 MG
81 TABLET ORAL DAILY
Refills: 0 | Status: DISCONTINUED | OUTPATIENT
Start: 2023-12-22 | End: 2023-12-23

## 2023-12-22 RX ORDER — METOPROLOL TARTRATE 50 MG
25 TABLET ORAL EVERY 12 HOURS
Refills: 0 | Status: DISCONTINUED | OUTPATIENT
Start: 2023-12-22 | End: 2023-12-23

## 2023-12-22 RX ORDER — FUROSEMIDE 40 MG
40 TABLET ORAL DAILY
Refills: 0 | Status: DISCONTINUED | OUTPATIENT
Start: 2023-12-22 | End: 2023-12-23

## 2023-12-22 RX ORDER — SPIRONOLACTONE 25 MG/1
25 TABLET ORAL
Qty: 90 | Refills: 3 | Status: DISCONTINUED | COMMUNITY
Start: 2022-10-14 | End: 2023-12-22

## 2023-12-22 RX ORDER — TEMAZEPAM 15 MG/1
15 CAPSULE ORAL AT BEDTIME
Refills: 0 | Status: DISCONTINUED | OUTPATIENT
Start: 2023-12-22 | End: 2023-12-23

## 2023-12-22 RX ORDER — ENOXAPARIN SODIUM 100 MG/ML
40 INJECTION SUBCUTANEOUS EVERY 24 HOURS
Refills: 0 | Status: DISCONTINUED | OUTPATIENT
Start: 2023-12-22 | End: 2023-12-23

## 2023-12-22 RX ORDER — ARIPIPRAZOLE 2 MG/1
2 TABLET ORAL
Qty: 90 | Refills: 0 | Status: COMPLETED | COMMUNITY
Start: 2023-02-07 | End: 2023-12-22

## 2023-12-22 RX ORDER — FUROSEMIDE 40 MG
40 TABLET ORAL ONCE
Refills: 0 | Status: COMPLETED | OUTPATIENT
Start: 2023-12-22 | End: 2023-12-22

## 2023-12-22 RX ORDER — CLOPIDOGREL BISULFATE 75 MG/1
75 TABLET, FILM COATED ORAL DAILY
Refills: 0 | Status: DISCONTINUED | OUTPATIENT
Start: 2023-12-22 | End: 2023-12-23

## 2023-12-22 RX ORDER — ATORVASTATIN CALCIUM 80 MG/1
80 TABLET, FILM COATED ORAL AT BEDTIME
Refills: 0 | Status: DISCONTINUED | OUTPATIENT
Start: 2023-12-22 | End: 2023-12-23

## 2023-12-22 RX ORDER — FUROSEMIDE INJECTION 80 MG/ 10 ML 8 MG/ML
80 INJECTION SUBCUTANEOUS
Qty: 3 | Refills: 1 | Status: COMPLETED | COMMUNITY
Start: 2023-10-09 | End: 2023-12-22

## 2023-12-22 RX ORDER — ONDANSETRON 8 MG/1
4 TABLET, FILM COATED ORAL EVERY 8 HOURS
Refills: 0 | Status: DISCONTINUED | OUTPATIENT
Start: 2023-12-22 | End: 2023-12-23

## 2023-12-22 RX ORDER — VANCOMYCIN HCL 1 G
1000 VIAL (EA) INTRAVENOUS ONCE
Refills: 0 | Status: COMPLETED | OUTPATIENT
Start: 2023-12-22 | End: 2023-12-22

## 2023-12-22 RX ADMIN — ATORVASTATIN CALCIUM 80 MILLIGRAM(S): 80 TABLET, FILM COATED ORAL at 22:59

## 2023-12-22 RX ADMIN — PIPERACILLIN AND TAZOBACTAM 200 GRAM(S): 4; .5 INJECTION, POWDER, LYOPHILIZED, FOR SOLUTION INTRAVENOUS at 20:14

## 2023-12-22 RX ADMIN — Medication 250 MILLIGRAM(S): at 21:05

## 2023-12-22 RX ADMIN — TAMSULOSIN HYDROCHLORIDE 0.4 MILLIGRAM(S): 0.4 CAPSULE ORAL at 22:59

## 2023-12-22 RX ADMIN — Medication 40 MILLIGRAM(S): at 20:14

## 2023-12-22 RX ADMIN — MIRTAZAPINE 30 MILLIGRAM(S): 45 TABLET, ORALLY DISINTEGRATING ORAL at 23:07

## 2023-12-22 RX ADMIN — Medication 3 MILLILITER(S): at 16:30

## 2023-12-22 NOTE — H&P ADULT - HISTORY OF PRESENT ILLNESS
78M PMHx of CAD, lung adenocarcinoma s/p RUL lobectomy 09/2022, CHFrEF, HTN, HLD, depression presenting with shortness of breath x few days. Was recently admitted for RSV and discharged yesterday from [facility]. Today, was having difficulty breathing while at rest and worsened while on exertion. Also has been noticing worsening LE pitting edema b/l. Compliant with his home medications. A/w mild cough, nonproductive. Denies any chest pain, abdominal pain,  nausea/vomiting, headaches, fevers, chills, diarrhea  weakness, syncope, hematuria, dysuria, urinary symptoms, subjective neurological deficits. 78M PMHx of CAD, lung adenocarcinoma s/p RUL lobectomy 09/2022, CHFrEF, HTN, HLD, depression presenting with shortness of breath x few days. Was recently admitted for RSV and discharged yesterday from [facility]. Today, was having difficulty breathing while at rest and worsened while on exertion. Also has been noticing worsening LE pitting edema b/l. Compliant with his home medications. A/w mild cough, nonproductive. Denies any chest pain, abdominal pain,  nausea/vomiting, headaches, fevers, chills, diarrhea  weakness, syncope, hematuria, dysuria, urinary symptoms, subjective neurological deficits. 78M PMHx of CAD, lung adenocarcinoma s/p RUL lobectomy 09/2022, CHFrEF, HTN, HLD, depression presenting with shortness of breath x few days. Was admitted 12/18 - 12/21/23 for RSV treated solumedrol 40 iv and d/c with prednisone taper, he took 30 mg pred today, now comes with c/o having more difficulty breathing since this am while at rest and worsened while on exertion. Also has been noticing worsening LE pitting edema b/l since last admission   A/w mild cough, nonproductive. Denies any chest pain, abdominal pain,  nausea/vomiting, headaches, fevers, chills, diarrhea  weakness, syncope, hematuria, dysuria, urinary symptoms, subjective neurological deficits.    wife at the bedside.    in ED - cxr with worsening HARRY findings and elevated trops and bnp

## 2023-12-22 NOTE — H&P ADULT - ASSESSMENT
78M PMHx of CAD, lung adenocarcinoma s/p RUL lobectomy 09/2022, CHFrEF, HTN, HLD, depression presenting with shortness of breath x few days. Was admitted 12/18 - 12/21/23 for RSV treated solumedrol 40 iv and d/c with prednisone taper, he took 30 mg pred today, now comes with c/o having more difficulty breathing since this am while at rest and worsened while on exertion. Also has been noticing worsening LE pitting edema b/l since last admission   A/w mild cough, nonproductive. Denies any chest pain, abdominal pain,  nausea/vomiting, headaches, fevers, chills, diarrhea  weakness, syncope, hematuria, dysuria, urinary symptoms, subjective neurological deficits.    worsening sob likely due to worsening underlying  HARRY infiltrate and recent RSV infection 12/18/23 in the setting of lung cancer, no hypoxia  admit to tele  cxr with worsening HARRY findings and elevated trops and bnp  ct chest - noted as above  trend trops- elevated likely demand  d dimer mildly elevated- unable to get contrast will order vq scan for am  recent echo as above  diuretics  am labs  BNP elevated since 12/18/23, repeat BNP in am   will treat with epimeric abtx- ZOSYN , reassess in am  pulm dr. rodriguez consult for am  hold prednisone due to fluid retention    hyponatremia  likely due to chf, diuretics  monitor  1L, fluid restriction     ckd 3-  creat baseline  will monitor    worsening  leg edema in the setting of h/o HFrEF  Diuretics  f/u BNP  doppler r/o DVT    HFrEF, CAD  echo as above  diuretics, bb, asa, Plavix  home meds- Bumex will change to Lasix iv 40 qd    HLD- statin    BPH- Flomax    depression- Prozac    vte ppx- Lovenox       78M PMHx of CAD, lung adenocarcinoma s/p RUL lobectomy 09/2022, CHFrEF, HTN, HLD, depression presenting with shortness of breath x few days. Was admitted 12/18 - 12/21/23 for RSV treated solumedrol 40 iv and d/c with prednisone taper, he took 30 mg pred today, now comes with c/o having more difficulty breathing since this am while at rest and worsened while on exertion. Also has been noticing worsening LE pitting edema b/l since last admission   A/w mild cough, nonproductive. Denies any chest pain, abdominal pain,  nausea/vomiting, headaches, fevers, chills, diarrhea  weakness, syncope, hematuria, dysuria, urinary symptoms, subjective neurological deficits.    worsening sob likely due to worsening underlying  HARRY infiltrate and recent RSV infection 12/18/23 in the setting of lung cancer, no hypoxia  admit to tele  cxr with worsening HARRY findings and elevated trops and bnp  ct chest - noted as above  trend trops- elevated likely demand  d dimer mildly elevated- unable to get contrast will order vq scan for am  recent echo as above  diuretics  am labs  BNP elevated since 12/18/23, repeat BNP in am   will treat with epimeric abtx- ZOSYN , reassess in am  pulm dr. rodriguez consult for am  hold prednisone due to fluid retention    hyponatremia  likely due to chf, diuretics  monitor  1L, fluid restriction     transaminitis elevated bili- likely chronic in the setting of lung cancer   s/p radiation , last dose march/23  NM PET/CT Onc FDG Skull to Thigh, Subsq (08.08.23 @ 18:00) >Redemonstration of a left upper lobe lobulated lung nodule that is stable in size (anatomically) and has slightly decreased metabolically (FDG uptake). Continued follow-up as clinically indicated  scheduled for PET scan jan 8 2024    ckd 3-  creat baseline  will monitor    worsening  leg edema in the setting of h/o HFrEF  Diuretics  f/u BNP  doppler r/o DVT    HFrEF, CAD  echo as above  diuretics, bb, asa, Plavix  home meds- Bumex will change to Lasix iv 40 qd    HLD- statin    BPH- Flomax    depression- Prozac    vte ppx- Lovenox

## 2023-12-22 NOTE — ED PROVIDER NOTE - CLINICAL SUMMARY MEDICAL DECISION MAKING FREE TEXT BOX
78M PMHx of CAD, lung adenocarcinoma s/p RUL lobectomy 09/2022, CHFrEF, HTN, HLD, depression presenting with shortness of breath x few days. Was recently admitted for RSV and discharged yesterday from [facility]. Today, was having difficulty breathing while at rest and worsened while on exertion. Also has been noticing worsening LE pitting edema b/l. Compliant with his home medications. A/w mild cough, nonproductive. Denies any chest pain, abdominal pain,  nausea/vomiting, headaches, fevers, chills, diarrhea  weakness, syncope, hematuria, dysuria, urinary symptoms, subjective neurological deficits.     Patient presents with signs and symptoms consistent with an acute exacerbation of chronic CHF, likely due to exacerbated w/ RSV. Differential diagnosis: alternate cardiopulmonary causes (i.e. ischemia, PE, pneumothorax, pneumonia), other causes of dyspnea (i.e. asthma/reactive airway disease, COPD, flash pulmonary edema, cardiac dysrhythmia); but these are less likely given the history, presentation, and physical exam.  PLAN:  - CBC, CMP,  Troponin, Pro-BNP,   EKG, CXR  -  IV diuresis with furosemide, Electrolyte repletion PRN, Supplemental oxygen PRN, BIPAP if worsening respiratory status.  - Recently dx RSV, CXR showing new RUL infiltrate. ? RSV  - 78M PMHx of CAD, lung adenocarcinoma s/p RUL lobectomy 09/2022, CHFrEF, HTN, HLD, depression presenting with shortness of breath x few days. Was recently admitted for RSV and discharged yesterday from [facility]. Today, was having difficulty breathing while at rest and worsened while on exertion. Also has been noticing worsening LE pitting edema b/l. Compliant with his home medications. A/w mild cough, nonproductive. Denies any chest pain, abdominal pain,  nausea/vomiting, headaches, fevers, chills, diarrhea  weakness, syncope, hematuria, dysuria, urinary symptoms, subjective neurological deficits.     Patient presents with signs and symptoms consistent with an acute exacerbation of chronic CHF, likely due to exacerbated w/ RSV. Differential diagnosis: alternate cardiopulmonary causes (i.e. ischemia, PE, pneumothorax, pneumonia), other causes of dyspnea (i.e. asthma/reactive airway disease, COPD, flash pulmonary edema, cardiac dysrhythmia); but these are less likely given the history, presentation, and physical exam.  PLAN:  - CBC, CMP,  Troponin, Pro-BNP,   EKG, CXR  -  IV diuresis with furosemide, Electrolyte repletion PRN, Supplemental oxygen PRN, BIPAP if worsening respiratory status.  - Recently dx RSV, CXR showing new RUL infiltrate. ? RSV  - 78M PMHx of CAD, lung adenocarcinoma s/p RUL lobectomy 09/2022, CHFrEF, HTN, HLD, depression presenting with shortness of breath x few days. Was recently admitted for RSV and discharged yesterday from [facility]. Today, was having difficulty breathing while at rest and worsened while on exertion. Also has been noticing worsening LE pitting edema b/l. Compliant with his home medications. A/w mild cough, nonproductive. Denies any chest pain, abdominal pain,  nausea/vomiting, headaches, fevers, chills, diarrhea  weakness, syncope, hematuria, dysuria, urinary symptoms, subjective neurological deficits.     Patient presents with signs and symptoms consistent with an acute exacerbation of chronic CHF, likely due to exacerbated w/ RSV. Differential diagnosis: alternate cardiopulmonary causes (i.e. ischemia, PE, pneumothorax, pneumonia), other causes of dyspnea (i.e. asthma/reactive airway disease, COPD, flash pulmonary edema, cardiac dysrhythmia); but these are less likely given the history, presentation, and physical exam.  PLAN:  - CBC, CMP,  Troponin, Pro-BNP,   EKG, CXR  -  IV diuresis with furosemide, Electrolyte repletion PRN, Supplemental oxygen PRN, BIPAP if worsening respiratory status.  - Recently dx RSV, CXR showing new HARRY infiltrate. ? RSV  - discussed with hospitalist Dr. Alejandre about admit for chf and pna, accepts. Will send RVP and blood cultures. Tx vanco/zosyn for HCAP. 78M PMHx of CAD, lung adenocarcinoma s/p RUL lobectomy 09/2022, CHFrEF, HTN, HLD, depression presenting with shortness of breath x few days. Was recently admitted for RSV and discharged yesterday from [facility]. Today, was having difficulty breathing while at rest and worsened while on exertion. Also has been noticing worsening LE pitting edema b/l. Compliant with his home medications. A/w mild cough, nonproductive. Denies any chest pain, abdominal pain,  nausea/vomiting, headaches, fevers, chills, diarrhea  weakness, syncope, hematuria, dysuria, urinary symptoms, subjective neurological deficits.     Patient presents with signs and symptoms consistent with an acute exacerbation of chronic CHF, likely due to exacerbated w/ RSV. Differential diagnosis: alternate cardiopulmonary causes (i.e. ischemia, PE, pneumothorax, pneumonia), other causes of dyspnea (i.e. asthma/reactive airway disease, COPD, flash pulmonary edema, cardiac dysrhythmia); but these are less likely given the history, presentation, and physical exam.  PLAN:  - CBC, CMP,  Troponin, Pro-BNP,   EKG, CXR  -  IV diuresis with furosemide, Electrolyte repletion PRN, Supplemental oxygen PRN, BIPAP if worsening respiratory status.  - Recently dx RSV, CXR showing new HARRY infiltrate. ? RSV  - discussed with hospitalist Dr. Alejandre about admit for chf and pna, accepts. Will send RVP and blood cultures. Tx vanco/zosyn for HCAP.

## 2023-12-22 NOTE — H&P ADULT - NSHPPHYSICALEXAM_GEN_ALL_CORE
Vital Signs Last 24 Hrs  T(C): 36.6 (22 Dec 2023 15:17), Max: 36.6 (22 Dec 2023 15:17)  T(F): 97.9 (22 Dec 2023 15:17), Max: 97.9 (22 Dec 2023 15:17)  HR: 61 (22 Dec 2023 15:17) (61 - 61)  BP: 109/69 (22 Dec 2023 15:17) (109/69 - 109/69)  BP(mean): --  RR: 18 (22 Dec 2023 15:17) (18 - 18)  SpO2: 97% (22 Dec 2023 15:17) (97% - 97%)    Parameters below as of 22 Dec 2023 15:17  Patient On (Oxygen Delivery Method): room air      GENERAL- NAD  EAR/NOSE/MOUTH/THROAT - MMM  EYES- MEGHA, conjunctiva and Sclera clear  NECK- supple  RESPIRATORY-  rales  to auscultation bilaterally, non laboured breathing  CARDIOVASCULAR - SIS2, RRR  GI - soft NT BS present  EXTREMITIES- b/l LE  edema  NEUROLOGY- no gross focal deficits  PSYCHIATRY- AAO X 3

## 2023-12-22 NOTE — ED PROVIDER NOTE - PHYSICAL EXAMINATION
VITAL SIGNS: I have reviewed nursing notes and confirm.   GEN: Well-developed; well-nourished; in mild acute respiratory distress. Speaking full sentences.  SKIN: Warm, pink, no rash, no diaphoresis, no cyanosis, well perfused.   HEAD: Normocephalic; atraumatic.    NECK: Supple; non tender.   EYES: Pupils 3mm equal, round, reactive to light and accomodation, conjunctiva and sclera clear.   ENT: No nasal discharge; airway clear. Trachea is midline. Normal dentition.  CV: RRR. S1, S2 normal; no murmurs, gallops, or rubs. Capillary refill < 2 seconds throughout.   RESP: (+) diffuse expiratory wheezing, rhonchi throughout, (+) tachypnea.  ABD: Normal bowel sounds, soft, non-distended, non-tender, no rebound, no guarding, no rigidity   NEURO: Alert & oriented x 3, Gait: Fluid. Normal speech and coordination.

## 2023-12-22 NOTE — ED ADULT NURSE NOTE - NSFALLUNIVINTERV_ED_ALL_ED
Bed/Stretcher in lowest position, wheels locked, appropriate side rails in place/Call bell, personal items and telephone in reach/Instruct patient to call for assistance before getting out of bed/chair/stretcher/Non-slip footwear applied when patient is off stretcher/[place] to call system/Physically safe environment - no spills, clutter or unnecessary equipment/Purposeful proactive rounding/Room/bathroom lighting operational, light cord in reach Bed/Stretcher in lowest position, wheels locked, appropriate side rails in place/Call bell, personal items and telephone in reach/Instruct patient to call for assistance before getting out of bed/chair/stretcher/Non-slip footwear applied when patient is off stretcher/[place] to call system/Physically safe environment - no spills, clutter or unnecessary equipment/Purposeful proactive rounding/Room/bathroom lighting operational, light cord in reach

## 2023-12-22 NOTE — ED ADULT NURSE NOTE - OBJECTIVE STATEMENT
Pt from home,stated was seen by visiting nurse and was  told to come in because of his difficulty breathing since lastnight, wasrecently discharged from hospital,bilateral leg swelling deniespain

## 2023-12-22 NOTE — H&P ADULT - NSHPLABSRESULTS_GEN_ALL_CORE
11.7                 133  | 22   | 89           8.77  >-----------< 153     ------------------------< 131                   34.7                 4.5  | 98   | 1.65                                         Ca 9.0   Mg x     Ph x          Urinalysis Basic - ( 22 Dec 2023 16:35 )    Color: x / Appearance: x / SG: x / pH: x  Gluc: 131 mg/dL / Ketone: x  / Bili: x / Urobili: x   Blood: x / Protein: x / Nitrite: x   Leuk Esterase: x / RBC: x / WBC x   Sq Epi: x / Non Sq Epi: x / Bacteria: x      Labs reviewed:     CXR personally reviewed: < from: Xray Chest 1 View- PORTABLE-Urgent (Xray Chest 1 View- PORTABLE-Urgent .) (12.22.23 @ 16:22) >      IMPRESSION: Increasing atelectatic infiltrate left upper lobe.    < end of copied text >    < from: Xray Chest 1 View- PORTABLE-Urgent (Xray Chest 1 View- PORTABLE-Urgent .) (12.22.23 @ 16:22) >      IMPRESSION: Increasing atelectatic infiltrate left upper lobe.    ECG reviewed and interpreted: sinus at 60     < from: TTE Echo Complete w/o Contrast w/ Doppler (12.19.23 @ 08:34) >    Summary:   1. Severely decreased global left ventricular systolic function.   2. Left ventricular ejection fraction, by visual estimation, is 25 to   30%.   3. Severe global left ventricle hypokinesis with regional variation; the   entire apex and mid inferoseptal wall appear akinetic.    < end of copied text >    < from: CT Chest No Cont (12.18.23 @ 18:44) >    IMPRESSION:    1. Post right upper lobe lobectomy. No evidence of local disease   recurrence.  2. Findings compatible with small large airways inflammation more   pronounced when compared to prior imaging.  3. A nodular lesion within the apical posterior segment of the left upper   lobe which appears more nodular configuration measuring 32 x 20 mm   (333:142). Findings concerning for left upper lobe neoplasm and   correlation with whole-body PET/CT is suggested.  4. Bibasilar pleural effusions which have decreased slightly in extent on   the right side.  5. Mediastinal as well as paraesophageal lymphadenopathy slightly more   pronounced.  6. New ground glass opacity within the superior aspect of the residual   right middle lobe. Short interval surveillance in 3-6 months is suggested.    < end of copied text >

## 2023-12-22 NOTE — ED PROVIDER NOTE - CARE PLAN
Principal Discharge DX:	Pneumonia  Secondary Diagnosis:	Dyspnea on exertion  Secondary Diagnosis:	CHF exacerbation   1

## 2023-12-22 NOTE — ED PROVIDER NOTE - OBJECTIVE STATEMENT
78M PMHx of CAD, lung adenocarcinoma s/p RUL lobectomy 09/2022, CHFrEF, HTN, HLD, depression presenting with shortness of breath x few days. Was recently admitted for RSV and discharged yesterday from [facility]. Today, was having difficulty breathing while at rest and worsened while on exertion. Also has been noticing worsening LE pitting edema b/l. Compliant with his home medications. A/w mild cough, nonproductive. Denies any chest pain, abdominal pain,  nausea/vomiting, headaches, fevers, chills, diarrhea  weakness, syncope, hematuria, dysuria, urinary symptoms, subjective neurological deficits. 78M PMHx of CAD, lung adenocarcinoma s/p RUL lobectomy 09/2022, CHFrEF, HTN, HLD, depression presenting with shortness of breath x few days. Was recently admitted for RSV and discharged yesterday from [facility]. Today, was having difficulty breathing while at rest and worsened while on exertion. Also has been noticing worsening LE pitting edema b/l. Compliant with his home medications. A/w mild cough, nonproductive. Denies any chest pain, abdominal pain,  nausea/vomiting, headaches, fevers, chills, diarrhea  weakness, syncope, hematuria, dysuria, urinary symptoms, subjective neurological deficits.

## 2023-12-23 ENCOUNTER — TRANSCRIPTION ENCOUNTER (OUTPATIENT)
Age: 78
End: 2023-12-23

## 2023-12-23 VITALS
SYSTOLIC BLOOD PRESSURE: 107 MMHG | OXYGEN SATURATION: 97 % | RESPIRATION RATE: 18 BRPM | HEART RATE: 84 BPM | DIASTOLIC BLOOD PRESSURE: 64 MMHG

## 2023-12-23 DIAGNOSIS — N18.30 CHRONIC KIDNEY DISEASE, STAGE 3 UNSPECIFIED: ICD-10-CM

## 2023-12-23 DIAGNOSIS — Z29.9 ENCOUNTER FOR PROPHYLACTIC MEASURES, UNSPECIFIED: ICD-10-CM

## 2023-12-23 DIAGNOSIS — I25.10 ATHEROSCLEROTIC HEART DISEASE OF NATIVE CORONARY ARTERY WITHOUT ANGINA PECTORIS: ICD-10-CM

## 2023-12-23 DIAGNOSIS — I50.22 CHRONIC SYSTOLIC (CONGESTIVE) HEART FAILURE: ICD-10-CM

## 2023-12-23 DIAGNOSIS — E87.1 HYPO-OSMOLALITY AND HYPONATREMIA: ICD-10-CM

## 2023-12-23 DIAGNOSIS — R06.02 SHORTNESS OF BREATH: ICD-10-CM

## 2023-12-23 DIAGNOSIS — R74.01 ELEVATION OF LEVELS OF LIVER TRANSAMINASE LEVELS: ICD-10-CM

## 2023-12-23 DIAGNOSIS — F32.9 MAJOR DEPRESSIVE DISORDER, SINGLE EPISODE, UNSPECIFIED: ICD-10-CM

## 2023-12-23 DIAGNOSIS — N40.0 BENIGN PROSTATIC HYPERPLASIA WITHOUT LOWER URINARY TRACT SYMPTOMS: ICD-10-CM

## 2023-12-23 DIAGNOSIS — R60.0 LOCALIZED EDEMA: ICD-10-CM

## 2023-12-23 DIAGNOSIS — E78.5 HYPERLIPIDEMIA, UNSPECIFIED: ICD-10-CM

## 2023-12-23 LAB
ALBUMIN SERPL ELPH-MCNC: 2.9 G/DL — LOW (ref 3.3–5)
ALBUMIN SERPL ELPH-MCNC: 2.9 G/DL — LOW (ref 3.3–5)
ALP SERPL-CCNC: 278 U/L — HIGH (ref 40–120)
ALP SERPL-CCNC: 278 U/L — HIGH (ref 40–120)
ALT FLD-CCNC: 76 U/L — HIGH (ref 10–45)
ALT FLD-CCNC: 76 U/L — HIGH (ref 10–45)
ANION GAP SERPL CALC-SCNC: 11 MMOL/L — SIGNIFICANT CHANGE UP (ref 5–17)
ANION GAP SERPL CALC-SCNC: 11 MMOL/L — SIGNIFICANT CHANGE UP (ref 5–17)
AST SERPL-CCNC: 68 U/L — HIGH (ref 10–40)
AST SERPL-CCNC: 68 U/L — HIGH (ref 10–40)
BASOPHILS # BLD AUTO: 0.03 K/UL — SIGNIFICANT CHANGE UP (ref 0–0.2)
BASOPHILS # BLD AUTO: 0.03 K/UL — SIGNIFICANT CHANGE UP (ref 0–0.2)
BASOPHILS NFR BLD AUTO: 0.3 % — SIGNIFICANT CHANGE UP (ref 0–2)
BASOPHILS NFR BLD AUTO: 0.3 % — SIGNIFICANT CHANGE UP (ref 0–2)
BILIRUB SERPL-MCNC: 1.7 MG/DL — HIGH (ref 0.2–1.2)
BILIRUB SERPL-MCNC: 1.7 MG/DL — HIGH (ref 0.2–1.2)
BUN SERPL-MCNC: 83 MG/DL — HIGH (ref 7–23)
BUN SERPL-MCNC: 83 MG/DL — HIGH (ref 7–23)
CALCIUM SERPL-MCNC: 9.2 MG/DL — SIGNIFICANT CHANGE UP (ref 8.4–10.5)
CALCIUM SERPL-MCNC: 9.2 MG/DL — SIGNIFICANT CHANGE UP (ref 8.4–10.5)
CHLORIDE SERPL-SCNC: 98 MMOL/L — SIGNIFICANT CHANGE UP (ref 96–108)
CHLORIDE SERPL-SCNC: 98 MMOL/L — SIGNIFICANT CHANGE UP (ref 96–108)
CO2 SERPL-SCNC: 25 MMOL/L — SIGNIFICANT CHANGE UP (ref 22–31)
CO2 SERPL-SCNC: 25 MMOL/L — SIGNIFICANT CHANGE UP (ref 22–31)
CREAT SERPL-MCNC: 1.79 MG/DL — HIGH (ref 0.5–1.3)
CREAT SERPL-MCNC: 1.79 MG/DL — HIGH (ref 0.5–1.3)
EGFR: 38 ML/MIN/1.73M2 — LOW
EGFR: 38 ML/MIN/1.73M2 — LOW
EOSINOPHIL # BLD AUTO: 0.07 K/UL — SIGNIFICANT CHANGE UP (ref 0–0.5)
EOSINOPHIL # BLD AUTO: 0.07 K/UL — SIGNIFICANT CHANGE UP (ref 0–0.5)
EOSINOPHIL NFR BLD AUTO: 0.8 % — SIGNIFICANT CHANGE UP (ref 0–6)
EOSINOPHIL NFR BLD AUTO: 0.8 % — SIGNIFICANT CHANGE UP (ref 0–6)
GLUCOSE SERPL-MCNC: 123 MG/DL — HIGH (ref 70–99)
GLUCOSE SERPL-MCNC: 123 MG/DL — HIGH (ref 70–99)
HCT VFR BLD CALC: 33.1 % — LOW (ref 39–50)
HCT VFR BLD CALC: 33.1 % — LOW (ref 39–50)
HGB BLD-MCNC: 11.1 G/DL — LOW (ref 13–17)
HGB BLD-MCNC: 11.1 G/DL — LOW (ref 13–17)
IMM GRANULOCYTES NFR BLD AUTO: 3.5 % — HIGH (ref 0–0.9)
IMM GRANULOCYTES NFR BLD AUTO: 3.5 % — HIGH (ref 0–0.9)
LYMPHOCYTES # BLD AUTO: 1.5 K/UL — SIGNIFICANT CHANGE UP (ref 1–3.3)
LYMPHOCYTES # BLD AUTO: 1.5 K/UL — SIGNIFICANT CHANGE UP (ref 1–3.3)
LYMPHOCYTES # BLD AUTO: 17.1 % — SIGNIFICANT CHANGE UP (ref 13–44)
LYMPHOCYTES # BLD AUTO: 17.1 % — SIGNIFICANT CHANGE UP (ref 13–44)
MCHC RBC-ENTMCNC: 32.3 PG — SIGNIFICANT CHANGE UP (ref 27–34)
MCHC RBC-ENTMCNC: 32.3 PG — SIGNIFICANT CHANGE UP (ref 27–34)
MCHC RBC-ENTMCNC: 33.5 GM/DL — SIGNIFICANT CHANGE UP (ref 32–36)
MCHC RBC-ENTMCNC: 33.5 GM/DL — SIGNIFICANT CHANGE UP (ref 32–36)
MCV RBC AUTO: 96.2 FL — SIGNIFICANT CHANGE UP (ref 80–100)
MCV RBC AUTO: 96.2 FL — SIGNIFICANT CHANGE UP (ref 80–100)
MONOCYTES # BLD AUTO: 1.35 K/UL — HIGH (ref 0–0.9)
MONOCYTES # BLD AUTO: 1.35 K/UL — HIGH (ref 0–0.9)
MONOCYTES NFR BLD AUTO: 15.4 % — HIGH (ref 2–14)
MONOCYTES NFR BLD AUTO: 15.4 % — HIGH (ref 2–14)
NEUTROPHILS # BLD AUTO: 5.52 K/UL — SIGNIFICANT CHANGE UP (ref 1.8–7.4)
NEUTROPHILS # BLD AUTO: 5.52 K/UL — SIGNIFICANT CHANGE UP (ref 1.8–7.4)
NEUTROPHILS NFR BLD AUTO: 62.9 % — SIGNIFICANT CHANGE UP (ref 43–77)
NEUTROPHILS NFR BLD AUTO: 62.9 % — SIGNIFICANT CHANGE UP (ref 43–77)
NRBC # BLD: 0 /100 WBCS — SIGNIFICANT CHANGE UP (ref 0–0)
NRBC # BLD: 0 /100 WBCS — SIGNIFICANT CHANGE UP (ref 0–0)
PLATELET # BLD AUTO: 138 K/UL — LOW (ref 150–400)
PLATELET # BLD AUTO: 138 K/UL — LOW (ref 150–400)
POTASSIUM SERPL-MCNC: 3.9 MMOL/L — SIGNIFICANT CHANGE UP (ref 3.5–5.3)
POTASSIUM SERPL-MCNC: 3.9 MMOL/L — SIGNIFICANT CHANGE UP (ref 3.5–5.3)
POTASSIUM SERPL-SCNC: 3.9 MMOL/L — SIGNIFICANT CHANGE UP (ref 3.5–5.3)
POTASSIUM SERPL-SCNC: 3.9 MMOL/L — SIGNIFICANT CHANGE UP (ref 3.5–5.3)
PROT SERPL-MCNC: 7 G/DL — SIGNIFICANT CHANGE UP (ref 6–8.3)
PROT SERPL-MCNC: 7 G/DL — SIGNIFICANT CHANGE UP (ref 6–8.3)
RBC # BLD: 3.44 M/UL — LOW (ref 4.2–5.8)
RBC # BLD: 3.44 M/UL — LOW (ref 4.2–5.8)
RBC # FLD: 17.8 % — HIGH (ref 10.3–14.5)
RBC # FLD: 17.8 % — HIGH (ref 10.3–14.5)
SODIUM SERPL-SCNC: 134 MMOL/L — LOW (ref 135–145)
SODIUM SERPL-SCNC: 134 MMOL/L — LOW (ref 135–145)
TROPONIN I, HIGH SENSITIVITY RESULT: 92.7 NG/L — HIGH
TROPONIN I, HIGH SENSITIVITY RESULT: 92.7 NG/L — HIGH
WBC # BLD: 8.78 K/UL — SIGNIFICANT CHANGE UP (ref 3.8–10.5)
WBC # BLD: 8.78 K/UL — SIGNIFICANT CHANGE UP (ref 3.8–10.5)
WBC # FLD AUTO: 8.78 K/UL — SIGNIFICANT CHANGE UP (ref 3.8–10.5)
WBC # FLD AUTO: 8.78 K/UL — SIGNIFICANT CHANGE UP (ref 3.8–10.5)

## 2023-12-23 PROCEDURE — 84484 ASSAY OF TROPONIN QUANT: CPT

## 2023-12-23 PROCEDURE — 87040 BLOOD CULTURE FOR BACTERIA: CPT

## 2023-12-23 PROCEDURE — 85025 COMPLETE CBC W/AUTO DIFF WBC: CPT

## 2023-12-23 PROCEDURE — 99285 EMERGENCY DEPT VISIT HI MDM: CPT | Mod: 25

## 2023-12-23 PROCEDURE — 36415 COLL VENOUS BLD VENIPUNCTURE: CPT

## 2023-12-23 PROCEDURE — 99239 HOSP IP/OBS DSCHRG MGMT >30: CPT

## 2023-12-23 PROCEDURE — 83880 ASSAY OF NATRIURETIC PEPTIDE: CPT

## 2023-12-23 PROCEDURE — 85610 PROTHROMBIN TIME: CPT

## 2023-12-23 PROCEDURE — A9540: CPT

## 2023-12-23 PROCEDURE — 0225U NFCT DS DNA&RNA 21 SARSCOV2: CPT

## 2023-12-23 PROCEDURE — 93005 ELECTROCARDIOGRAM TRACING: CPT

## 2023-12-23 PROCEDURE — 78582 LUNG VENTILAT&PERFUS IMAGING: CPT | Mod: 26

## 2023-12-23 PROCEDURE — 85730 THROMBOPLASTIN TIME PARTIAL: CPT

## 2023-12-23 PROCEDURE — A9567: CPT

## 2023-12-23 PROCEDURE — 71045 X-RAY EXAM CHEST 1 VIEW: CPT

## 2023-12-23 PROCEDURE — 80053 COMPREHEN METABOLIC PANEL: CPT

## 2023-12-23 PROCEDURE — 85379 FIBRIN DEGRADATION QUANT: CPT

## 2023-12-23 PROCEDURE — 94640 AIRWAY INHALATION TREATMENT: CPT

## 2023-12-23 PROCEDURE — 78582 LUNG VENTILAT&PERFUS IMAGING: CPT

## 2023-12-23 RX ORDER — TAMSULOSIN HYDROCHLORIDE 0.4 MG/1
0.8 CAPSULE ORAL AT BEDTIME
Refills: 0 | Status: DISCONTINUED | OUTPATIENT
Start: 2023-12-22 | End: 2023-12-23

## 2023-12-23 RX ORDER — FLUOXETINE HCL 10 MG
20 CAPSULE ORAL
Refills: 0 | Status: DISCONTINUED | OUTPATIENT
Start: 2023-12-24 | End: 2023-12-23

## 2023-12-23 RX ORDER — FLUOXETINE HCL 10 MG
40 CAPSULE ORAL
Refills: 0 | Status: DISCONTINUED | OUTPATIENT
Start: 2023-12-23 | End: 2023-12-23

## 2023-12-23 RX ORDER — BUMETANIDE 0.25 MG/ML
2 INJECTION INTRAMUSCULAR; INTRAVENOUS
Refills: 0 | Status: DISCONTINUED | OUTPATIENT
Start: 2023-12-23 | End: 2023-12-23

## 2023-12-23 RX ADMIN — Medication 10 MILLIGRAM(S): at 13:29

## 2023-12-23 RX ADMIN — Medication 20 MILLIGRAM(S): at 13:30

## 2023-12-23 RX ADMIN — PIPERACILLIN AND TAZOBACTAM 25 GRAM(S): 4; .5 INJECTION, POWDER, LYOPHILIZED, FOR SOLUTION INTRAVENOUS at 04:34

## 2023-12-23 RX ADMIN — TEMAZEPAM 15 MILLIGRAM(S): 15 CAPSULE ORAL at 01:50

## 2023-12-23 RX ADMIN — Medication 81 MILLIGRAM(S): at 17:52

## 2023-12-23 RX ADMIN — CLOPIDOGREL BISULFATE 75 MILLIGRAM(S): 75 TABLET, FILM COATED ORAL at 17:51

## 2023-12-23 RX ADMIN — ENOXAPARIN SODIUM 40 MILLIGRAM(S): 100 INJECTION SUBCUTANEOUS at 06:49

## 2023-12-23 RX ADMIN — Medication 40 MILLIGRAM(S): at 09:06

## 2023-12-23 NOTE — DISCHARGE NOTE NURSING/CASE MANAGEMENT/SOCIAL WORK - PATIENT PORTAL LINK FT
You can access the FollowMyHealth Patient Portal offered by [facility] by registering at the following website: [email or web address]. By joining [facility]’s FollowMyHealth portal, you will also be able to view your health information using other applications (apps) compatible with our system. You can access the FollowMyHealth Patient Portal offered by [facility] by registering at the following website: [email or web address]. By joining [facility]’s FollowMyHealth portal, you will also be able to view your health information using other applications (apps) compatible with our system.

## 2023-12-23 NOTE — PROGRESS NOTE ADULT - PROBLEM SELECTOR PLAN 3
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 Continued follow-up as clinically indicated  - scheduled for PET scan jan 8 2024

## 2023-12-23 NOTE — CONSULT NOTE ADULT - SUBJECTIVE AND OBJECTIVE BOX
PULMONARY CONSULT  Location of Patient :  3EST E330 W1 ( 3EST)  Attending requesting Consult:Nohemy Alejandre  Chief Complaint :     Reason For consult :      Initial HPI on admission:  HPI:  78M PMHx of CAD, lung adenocarcinoma s/p RUL lobectomy 09/2022, CHFrEF, HTN, HLD, depression presenting with shortness of breath x few days. Was admitted 12/18 - 12/21/23 for RSV treated solumedrol 40 iv and d/c with prednisone taper, he took 30 mg pred today, now comes with c/o having more difficulty breathing since this am while at rest and worsened while on exertion. Also has been noticing worsening LE pitting edema b/l since last admission   A/w mild cough, nonproductive. Denies any chest pain, abdominal pain,  nausea/vomiting, headaches, fevers, chills, diarrhea  weakness, syncope, hematuria, dysuria, urinary symptoms, subjective neurological deficits.    wife at the bedside.    in ED - cxr with worsening HARRY findings and elevated trops and bnp (22 Dec 2023 20:21)      BRIEF HOSPITAL COURSE: ***    PAST MEDICAL & SURGICAL HISTORY:  BPH (benign prostatic hyperplasia)      Bipolar disorder      Depression      Anxiety      Umbilical hernia, incarcerated      Depression      Constipation      Urinary retention      CAD (coronary artery disease)      SCC (squamous cell carcinoma)      Lung mass      Other nonspecific abnormal finding of lung field      LV (left ventricular) mural thrombus      History of inguinal hernia      History of CHF (congestive heart failure)      History of arthroscopy of knee  Right, 1987      History of tonsillectomy  1952      History of hernia repair      H/O coronary angiogram        Allergies    No Known Allergies    Intolerances      FAMILY HISTORY:  Family history of depression (Mother)    FH: CAD (coronary artery disease) (Sibling, Sibling)    Family history of diabetes mellitus (DM) (Sibling)      Social history: Social History:       Smoking:     Drinking:     Drug use:    Review of Systems: as stated above    CONSTITUTIONAL: No fever, No chills, No fatigue  EYES: No eye pain, No visual disturbances, No discharge  ENMT:  No difficulty hearing, No tinnitus, No vertigo; No sinus or throat pain  NECK: No pain, No stiffness  RESPIRATORY: No Cough, No SOB, No Secretions  CARDIOVASCULAR: No chest pain, No palpitations, No dizziness, or No leg swelling  GASTROINTESTINAL: No abdominal or epigastric pain. No nausea, No vomiting, No hematemesis; No diarrhea, No constipation. No melena, No hematochezia.  GENITOURINARY: No dysuria, No frequency, No hematuria, No incontinence  NEUROLOGICAL: No headaches, No memory loss, No loss of strength, No numbness, No tremors  SKIN: No itching, No burning, No rashes, No lesions   MUSCULOSKELETAL: No joint pain or swelling; No muscle, back, No extremity pain  PSYCHIATRIC: No depression, No anxiety, No mood swings, No difficulty sleeping      Medications:  MEDICATIONS  (STANDING):  aspirin  chewable 81 milliGRAM(s) Oral daily  atorvastatin 80 milliGRAM(s) Oral at bedtime  buMETAnide 2 milliGRAM(s) Oral two times a day  clopidogrel Tablet 75 milliGRAM(s) Oral daily  enoxaparin Injectable 40 milliGRAM(s) SubCutaneous every 24 hours  FLUoxetine 40 milliGRAM(s) Oral <User Schedule>  metoprolol succinate ER 25 milliGRAM(s) Oral every 12 hours  mirtazapine 30 milliGRAM(s) Oral at bedtime  predniSONE   Tablet 20 milliGRAM(s) Oral daily  predniSONE   Tablet 10 milliGRAM(s) Oral once  tamsulosin 0.8 milliGRAM(s) Oral at bedtime    MEDICATIONS  (PRN):  acetaminophen     Tablet .. 650 milliGRAM(s) Oral every 6 hours PRN Temp greater or equal to 38C (100.4F), Mild Pain (1 - 3)  aluminum hydroxide/magnesium hydroxide/simethicone Suspension 30 milliLiter(s) Oral every 4 hours PRN Dyspepsia  melatonin 3 milliGRAM(s) Oral at bedtime PRN Insomnia  ondansetron Injectable 4 milliGRAM(s) IV Push every 8 hours PRN Nausea and/or Vomiting  temazepam 15 milliGRAM(s) Oral at bedtime PRN Insomnia      Antibiotics History  piperacillin/tazobactam IVPB.. 3.375 Gram(s) IV Intermittent every 8 hours, 12-22-23 @ 21:22, Stop order after: 7 Days  piperacillin/tazobactam IVPB... 3.375 Gram(s) IV Intermittent once, 12-22-23 @ 19:22, Stop order after: 1 Doses  vancomycin  IVPB. 1000 milliGRAM(s) IV Intermittent once, 12-22-23 @ 19:22, Stop order after: 1 Doses      Heme Medications   aspirin  chewable 81 milliGRAM(s) Oral daily, 12-22-23 @ 20:42  clopidogrel Tablet 75 milliGRAM(s) Oral daily, 12-22-23 @ 20:43  enoxaparin Injectable 40 milliGRAM(s) SubCutaneous every 24 hours, 12-22-23 @ 21:24      GI Medications  aluminum hydroxide/magnesium hydroxide/simethicone Suspension 30 milliLiter(s) Oral every 4 hours, 12-22-23 @ 21:20, Routine PRN        Home Medications:  Last Order Reconciliation Date: 12-23-23 @ 00:02 (Admission Reconciliation)  aspirin 81 mg oral capsule: 1 cap(s) orally once a day  (12-22-23 @ 15:40)  Bumex 2 mg oral tablet: 1 tab(s) orally 2 times a day (12-22-23 @ 15:40)  Flomax 0.4 mg oral capsule: 2 cap(s) orally once a day (12-22-23 @ 21:18)  Metoprolol Succinate ER 25 mg oral tablet, extended release: 1 tab(s) orally every 12 hours (12-22-23 @ 15:40)  mirtazapine 15 mg oral tablet: 2 tab(s) orally once a day (at bedtime)  (12-22-23 @ 15:40)  Plavix 75 mg oral tablet: 1 tab(s) orally once a day  (12-22-23 @ 15:40)  predniSONE 10 mg oral tablet: 3 tab(s) orally once a day 30 mg oral x 2 days, then 20 mg oral x 2 days, then 10 mg oral x 2 days, then stop (12-22-23 @ 15:40)  PROzac 20 mg oral capsule: 1 cap(s) orally every other day (12-22-23 @ 15:40)  PROzac 40 mg oral capsule: 1 cap(s) orally every other day (12-22-23 @ 15:40)  Restoril 15 mg oral capsule: 1 cap(s) orally once a day (at bedtime) as needed for  insomnia (12-22-23 @ 15:40)  rosuvastatin 20 mg oral tablet: 1 tab(s) orally once a day (at bedtime) (12-22-23 @ 16:04)      LABS:                        11.1   8.78  )-----------( 138      ( 23 Dec 2023 06:50 )             33.1     12-23    134<L>  |  98  |  83<H>  ----------------------------<  123<H>  3.9   |  25  |  1.79<H>    Ca    9.2      23 Dec 2023 06:50    TPro  7.0  /  Alb  2.9<L>  /  TBili  1.7<H>  /  DBili  x   /  AST  68<H>  /  ALT  76<H>  /  AlkPhos  278<H>  12-23    HIT ab -- 12-22 @ 16:35  HIT ab EIA --  D Dimer -444          PT/INR - ( 22 Dec 2023 16:35 )   PT: 12.7 sec;   INR: 1.12 ratio         PTT - ( 22 Dec 2023 16:35 )  PTT:29.7 sec  Urinalysis Basic - ( 23 Dec 2023 06:50 )    Color: x / Appearance: x / SG: x / pH: x  Gluc: 123 mg/dL / Ketone: x  / Bili: x / Urobili: x   Blood: x / Protein: x / Nitrite: x   Leuk Esterase: x / RBC: x / WBC x   Sq Epi: x / Non Sq Epi: x / Bacteria: x              CULTURES: (if applicable)    Rapid RVP Result: Detected (12-22-23 @ 21:20)  Rapid RVP Result: Detected (12-18-23 @ 15:55)        CAPILLARY BLOOD GLUCOSE          RADIOLOGY  CXR:      CT:    ECHO:      VITALS:  T(C): 36.9 (12-23-23 @ 05:16), Max: 36.9 (12-23-23 @ 05:16)  T(F): 98.4 (12-23-23 @ 05:16), Max: 98.4 (12-23-23 @ 05:16)  HR: 59 (12-23-23 @ 12:30) (59 - 88)  BP: 89/49 (12-23-23 @ 12:30) (89/49 - 116/65)  BP(mean): --  ABP: --  ABP(mean): --  RR: 17 (12-23-23 @ 12:30) (17 - 18)  SpO2: 99% (12-23-23 @ 12:30) (95% - 99%)  CVP(mm Hg): --  CVP(cm H2O): --    Ins and Outs     12-23-23 @ 07:01  -  12-23-23 @ 12:38  --------------------------------------------------------  IN: 120 mL / OUT: 700 mL / NET: -580 mL        Height (cm): 182.9 (12-23-23 @ 09:44)  Weight (kg): 83.9 (12-23-23 @ 09:44)  BMI (kg/m2): 25.1 (12-23-23 @ 09:44)        I&O's Detail    23 Dec 2023 07:01  -  23 Dec 2023 12:38  --------------------------------------------------------  IN:    Oral Fluid: 120 mL  Total IN: 120 mL    OUT:    Voided (mL): 700 mL  Total OUT: 700 mL    Total NET: -580 mL          Physical Examination:  GENERAL:               Alert, Oriented, No acute distress.    HEENT:                    Pupils equal, reactive to light.  Symmetric. No JVD, Moist MM  PULM:                     Bilateral air entry, Clear to auscultation bilaterally, no significant sputum production, No Rales, No Rhonchi, No Wheezing  CVS:                         S1, S2,  No Murmur  ABD:                        Soft, nondistended, nontender, normoactive bowel sounds,   EXT:                         No edema, nontender, No Cyanosis or Clubbing   Vascular:                Warm Extremities, Normal Capillary refill, Normal Distal Pulses  SKIN:                       Warm and well perfused, no rashes noted.   NEURO:                  Alert, oriented, interactive, nonfocal, follows commands  PSYC:                      Calm, + Insight.     PULMONARY CONSULT  Location of Patient :  [facility] E330 W1 ( 3[facility])  Attending requesting Consult:Nohemy Alejandre  Chief Complaint :     Reason For consult :      Initial HPI on admission:  HPI:  78M PMHx of CAD, lung adenocarcinoma s/p RUL lobectomy 09/2022, CHFrEF, HTN, HLD, depression presenting with shortness of breath x few days. Was admitted 12/18 - 12/21/23 for RSV treated solumedrol 40 iv and d/c with prednisone taper, he took 30 mg pred today, now comes with c/o having more difficulty breathing since this am while at rest and worsened while on exertion. Also has been noticing worsening LE pitting edema b/l since last admission   A/w mild cough, nonproductive. Denies any chest pain, abdominal pain,  nausea/vomiting, headaches, fevers, chills, diarrhea  weakness, syncope, hematuria, dysuria, urinary symptoms, subjective neurological deficits.    wife at the bedside.    in ED - cxr with worsening HARRY findings and elevated trops and bnp (22 Dec 2023 20:21)      BRIEF HOSPITAL COURSE: patient seen and examined  known to me from last visit and out patient   patient back to baseline this am  patient taken off steroids  s/p diuretics feeling better      PAST MEDICAL & SURGICAL HISTORY:  BPH (benign prostatic hyperplasia)  Bipolar disorder  Depression  Anxiety  Umbilical hernia, incarcerated  Depression  Constipation  Urinary retention  CAD (coronary artery disease)  SCC (squamous cell carcinoma)  Lung mass  Other nonspecific abnormal finding of lung field  LV (left ventricular) mural thrombus  History of inguinal hernia  History of CHF (congestive heart failure)  History of arthroscopy of knee Right, 1987  History of tonsillectomy 1952  History of hernia repair  H/O coronary angiogram        Allergies    No Known Allergies    Intolerances      FAMILY HISTORY:  Family history of depression (Mother)    FH: CAD (coronary artery disease) (Sibling, Sibling)    Family history of diabetes mellitus (DM) (Sibling)      Social history: never smoker     CONSTITUTIONAL: No fever, No chills, +fatigue  EYES: No eye pain, No visual disturbances, No discharge  ENMT:  No difficulty hearing, No tinnitus, No vertigo; No sinus or throat pain  NECK: No pain, No stiffness  RESPIRATORY: No Cough, +SOB, No Secretions  CARDIOVASCULAR: No chest pain, No palpitations, No dizziness, or +leg swelling  GASTROINTESTINAL: No abdominal or epigastric pain. No nausea, No vomiting, No hematemesis; No diarrhea, No constipation. No melena, No hematochezia.  GENITOURINARY: No dysuria, No frequency, No hematuria, No incontinence  NEUROLOGICAL: No headaches, No memory loss, No loss of strength, No numbness, No tremors  SKIN: No itching, No burning, No rashes, No lesions   MUSCULOSKELETAL: No joint pain or swelling; No muscle, back, No extremity pain  PSYCHIATRIC: Chronic depression, No anxiety, No mood swings, No difficulty sleeping      Medications:  MEDICATIONS  (STANDING):  aspirin  chewable 81 milliGRAM(s) Oral daily  atorvastatin 80 milliGRAM(s) Oral at bedtime  buMETAnide 2 milliGRAM(s) Oral two times a day  clopidogrel Tablet 75 milliGRAM(s) Oral daily  enoxaparin Injectable 40 milliGRAM(s) SubCutaneous every 24 hours  FLUoxetine 40 milliGRAM(s) Oral <User Schedule>  metoprolol succinate ER 25 milliGRAM(s) Oral every 12 hours  mirtazapine 30 milliGRAM(s) Oral at bedtime  predniSONE   Tablet 20 milliGRAM(s) Oral daily  predniSONE   Tablet 10 milliGRAM(s) Oral once  tamsulosin 0.8 milliGRAM(s) Oral at bedtime    MEDICATIONS  (PRN):  acetaminophen     Tablet .. 650 milliGRAM(s) Oral every 6 hours PRN Temp greater or equal to 38C (100.4F), Mild Pain (1 - 3)  aluminum hydroxide/magnesium hydroxide/simethicone Suspension 30 milliLiter(s) Oral every 4 hours PRN Dyspepsia  melatonin 3 milliGRAM(s) Oral at bedtime PRN Insomnia  ondansetron Injectable 4 milliGRAM(s) IV Push every 8 hours PRN Nausea and/or Vomiting  temazepam 15 milliGRAM(s) Oral at bedtime PRN Insomnia      Antibiotics History  piperacillin/tazobactam IVPB.. 3.375 Gram(s) IV Intermittent every 8 hours, 12-22-23 @ 21:22, Stop order after: 7 Days  piperacillin/tazobactam IVPB... 3.375 Gram(s) IV Intermittent once, 12-22-23 @ 19:22, Stop order after: 1 Doses  vancomycin  IVPB. 1000 milliGRAM(s) IV Intermittent once, 12-22-23 @ 19:22, Stop order after: 1 Doses      Heme Medications   aspirin  chewable 81 milliGRAM(s) Oral daily, 12-22-23 @ 20:42  clopidogrel Tablet 75 milliGRAM(s) Oral daily, 12-22-23 @ 20:43  enoxaparin Injectable 40 milliGRAM(s) SubCutaneous every 24 hours, 12-22-23 @ 21:24      GI Medications  aluminum hydroxide/magnesium hydroxide/simethicone Suspension 30 milliLiter(s) Oral every 4 hours, 12-22-23 @ 21:20, Routine PRN        Home Medications:  Last Order Reconciliation Date: 12-23-23 @ 00:02 (Admission Reconciliation)  aspirin 81 mg oral capsule: 1 cap(s) orally once a day  (12-22-23 @ 15:40)  Bumex 2 mg oral tablet: 1 tab(s) orally 2 times a day (12-22-23 @ 15:40)  Flomax 0.4 mg oral capsule: 2 cap(s) orally once a day (12-22-23 @ 21:18)  Metoprolol Succinate ER 25 mg oral tablet, extended release: 1 tab(s) orally every 12 hours (12-22-23 @ 15:40)  mirtazapine 15 mg oral tablet: 2 tab(s) orally once a day (at bedtime)  (12-22-23 @ 15:40)  Plavix 75 mg oral tablet: 1 tab(s) orally once a day  (12-22-23 @ 15:40)  predniSONE 10 mg oral tablet: 3 tab(s) orally once a day 30 mg oral x 2 days, then 20 mg oral x 2 days, then 10 mg oral x 2 days, then stop (12-22-23 @ 15:40)  PROzac 20 mg oral capsule: 1 cap(s) orally every other day (12-22-23 @ 15:40)  PROzac 40 mg oral capsule: 1 cap(s) orally every other day (12-22-23 @ 15:40)  Restoril 15 mg oral capsule: 1 cap(s) orally once a day (at bedtime) as needed for  insomnia (12-22-23 @ 15:40)  rosuvastatin 20 mg oral tablet: 1 tab(s) orally once a day (at bedtime) (12-22-23 @ 16:04)      LABS:                        11.1   8.78  )-----------( 138      ( 23 Dec 2023 06:50 )             33.1     12-23    134<L>  |  98  |  83<H>  ----------------------------<  123<H>  3.9   |  25  |  1.79<H>    Ca    9.2      23 Dec 2023 06:50    TPro  7.0  /  Alb  2.9<L>  /  TBili  1.7<H>  /  DBili  x   /  AST  68<H>  /  ALT  76<H>  /  AlkPhos  278<H>  12-23    HIT ab -- 12-22 @ 16:35  HIT ab EIA --  D Dimer -444          PT/INR - ( 22 Dec 2023 16:35 )   PT: 12.7 sec;   INR: 1.12 ratio         PTT - ( 22 Dec 2023 16:35 )  PTT:29.7 sec  Urinalysis Basic - ( 23 Dec 2023 06:50 )    Color: x / Appearance: x / SG: x / pH: x  Gluc: 123 mg/dL / Ketone: x  / Bili: x / Urobili: x   Blood: x / Protein: x / Nitrite: x   Leuk Esterase: x / RBC: x / WBC x   Sq Epi: x / Non Sq Epi: x / Bacteria: x              CULTURES: (if applicable)    Rapid RVP Result: Detected (12-22-23 @ 21:20)  Rapid RVP Result: Detected (12-18-23 @ 15:55)       RADIOLOGY  CXR:  < from: Xray Chest 1 View-PORTABLE IMMEDIATE (Xray Chest 1 View-PORTABLE IMMEDIATE .) (12.22.23 @ 22:41) >    Findings:    No change in tethering of the right lower pleura. The lungs are clear.   There are no pleural effusions. The cardiac silhouette is enlarged. Bones   are grossly normal.    IMPRESSION: Cardiomegaly.    --- End of Report ---    < end of copied text >      CT:  < from: CT Chest No Cont (12.18.23 @ 18:44) >    IMPRESSION:    1. Post right upper lobe lobectomy. No evidence of local disease   recurrence.  2. Findings compatible with small large airways inflammation more   pronounced when compared to prior imaging.  3. A nodular lesion within the apical posterior segment of the left upper   lobe which appears more nodular configuration measuring 32 x 20 mm   (333:142). Findings concerning for left upper lobe neoplasm and   correlation with whole-body PET/CT is suggested.  4. Bibasilar pleural effusions which have decreased slightly in extent on   the right side.  5. Mediastinal as well as paraesophageal lymphadenopath slightly more   pronounced.  6. New groundglass opacity within the superior aspect of the residual   right middle lobe. Short interval surveillance in 3-6 months is suggested.        --- End of Report ---    < end of copied text >    ECHO:    < from: TTE Echo Complete w/o Contrast w/ Doppler (12.19.23 @ 08:34) >    Summary:   1. Severely decreased global left ventricular systolic function.   2. Left ventricular ejection fraction, by visual estimation, is 25 to   30%.   3. Severe global left ventricle hypokinesis with regional variation; the   entire apex and mid inferoseptal wall appear akinetic.   4. Mildly increased LV wall thickness.   5. Normal left ventricular internal cavity size.   6. Severely enlarged left atrium.   7. Right atrial enlargement.   8. Mild mitral annular calcification.   9. Moderate mitral valve regurgitation.  10. Moderate tricuspid regurgitation.  11. Mild aortic regurgitation.  12. No aortic valve stenosis.  13. Mild pulmonic valve regurgitation.  14. Top normal sized aortaat the Sinuses of Valsalva (3.9 cm).  15. Estimated pulmonary artery systolic pressure is 43.7 mmHg assuming a   right atrial pressure of 15 mmHg, which is consistent with mild pulmonary   hypertension.  16. There is no evidence of pericardial effusion.    [id number] [facility] Gilliam MD Electronically signed on   12/19/2023 at 1:09:05 PM      < end of copied text >    vq    < from: NM Pulmonary Ventilation/Perfusion Scan (12.23.23 @ 10:57) >    IMPRESSION:    Low probability of pulmonary embolus.      < end of copied text >    VITALS:  T(C): 36.9 (12-23-23 @ 05:16), Max: 36.9 (12-23-23 @ 05:16)  T(F): 98.4 (12-23-23 @ 05:16), Max: 98.4 (12-23-23 @ 05:16)  HR: 59 (12-23-23 @ 12:30) (59 - 88)  BP: 89/49 (12-23-23 @ 12:30) (89/49 - 116/65)  BP(mean): --  ABP: --  ABP(mean): --  RR: 17 (12-23-23 @ 12:30) (17 - 18)  SpO2: 99% (12-23-23 @ 12:30) (95% - 99%)  CVP(mm Hg): --  CVP(cm H2O): --    Ins and Outs     12-23-23 @ 07:01  -  12-23-23 @ 12:38  --------------------------------------------------------  IN: 120 mL / OUT: 700 mL / NET: -580 mL        Height (cm): 182.9 (12-23-23 @ 09:44)  Weight (kg): 83.9 (12-23-23 @ 09:44)  BMI (kg/m2): 25.1 (12-23-23 @ 09:44)        I&O's Detail    23 Dec 2023 07:01  -  23 Dec 2023 12:38  --------------------------------------------------------  IN:    Oral Fluid: 120 mL  Total IN: 120 mL    OUT:    Voided (mL): 700 mL  Total OUT: 700 mL    Total NET: -580 mL          PULMONARY CONSULT  Location of Patient :  3EST E330 W1 ( 3EST)  Attending requesting Consult:Nohemy Alejandre  Chief Complaint :     Reason For consult :      Initial HPI on admission:  HPI:  78M PMHx of CAD, lung adenocarcinoma s/p RUL lobectomy 09/2022, CHFrEF, HTN, HLD, depression presenting with shortness of breath x few days. Was admitted 12/18 - 12/21/23 for RSV treated solumedrol 40 iv and d/c with prednisone taper, he took 30 mg pred today, now comes with c/o having more difficulty breathing since this am while at rest and worsened while on exertion. Also has been noticing worsening LE pitting edema b/l since last admission   A/w mild cough, nonproductive. Denies any chest pain, abdominal pain,  nausea/vomiting, headaches, fevers, chills, diarrhea  weakness, syncope, hematuria, dysuria, urinary symptoms, subjective neurological deficits.    wife at the bedside.    in ED - cxr with worsening HARRY findings and elevated trops and bnp (22 Dec 2023 20:21)      BRIEF HOSPITAL COURSE: patient seen and examined  known to me from last visit and out patient   patient back to baseline this am  patient taken off steroids  s/p diuretics feeling better      PAST MEDICAL & SURGICAL HISTORY:  BPH (benign prostatic hyperplasia)  Bipolar disorder  Depression  Anxiety  Umbilical hernia, incarcerated  Depression  Constipation  Urinary retention  CAD (coronary artery disease)  SCC (squamous cell carcinoma)  Lung mass  Other nonspecific abnormal finding of lung field  LV (left ventricular) mural thrombus  History of inguinal hernia  History of CHF (congestive heart failure)  History of arthroscopy of knee Right, 1987  History of tonsillectomy 1952  History of hernia repair  H/O coronary angiogram        Allergies    No Known Allergies    Intolerances      FAMILY HISTORY:  Family history of depression (Mother)    FH: CAD (coronary artery disease) (Sibling, Sibling)    Family history of diabetes mellitus (DM) (Sibling)      Social history: never smoker     CONSTITUTIONAL: No fever, No chills, +fatigue  EYES: No eye pain, No visual disturbances, No discharge  ENMT:  No difficulty hearing, No tinnitus, No vertigo; No sinus or throat pain  NECK: No pain, No stiffness  RESPIRATORY: No Cough, +SOB, No Secretions  CARDIOVASCULAR: No chest pain, No palpitations, No dizziness, or +leg swelling  GASTROINTESTINAL: No abdominal or epigastric pain. No nausea, No vomiting, No hematemesis; No diarrhea, No constipation. No melena, No hematochezia.  GENITOURINARY: No dysuria, No frequency, No hematuria, No incontinence  NEUROLOGICAL: No headaches, No memory loss, No loss of strength, No numbness, No tremors  SKIN: No itching, No burning, No rashes, No lesions   MUSCULOSKELETAL: No joint pain or swelling; No muscle, back, No extremity pain  PSYCHIATRIC: Chronic depression, No anxiety, No mood swings, No difficulty sleeping      Medications:  MEDICATIONS  (STANDING):  aspirin  chewable 81 milliGRAM(s) Oral daily  atorvastatin 80 milliGRAM(s) Oral at bedtime  buMETAnide 2 milliGRAM(s) Oral two times a day  clopidogrel Tablet 75 milliGRAM(s) Oral daily  enoxaparin Injectable 40 milliGRAM(s) SubCutaneous every 24 hours  FLUoxetine 40 milliGRAM(s) Oral <User Schedule>  metoprolol succinate ER 25 milliGRAM(s) Oral every 12 hours  mirtazapine 30 milliGRAM(s) Oral at bedtime  predniSONE   Tablet 20 milliGRAM(s) Oral daily  predniSONE   Tablet 10 milliGRAM(s) Oral once  tamsulosin 0.8 milliGRAM(s) Oral at bedtime    MEDICATIONS  (PRN):  acetaminophen     Tablet .. 650 milliGRAM(s) Oral every 6 hours PRN Temp greater or equal to 38C (100.4F), Mild Pain (1 - 3)  aluminum hydroxide/magnesium hydroxide/simethicone Suspension 30 milliLiter(s) Oral every 4 hours PRN Dyspepsia  melatonin 3 milliGRAM(s) Oral at bedtime PRN Insomnia  ondansetron Injectable 4 milliGRAM(s) IV Push every 8 hours PRN Nausea and/or Vomiting  temazepam 15 milliGRAM(s) Oral at bedtime PRN Insomnia      Antibiotics History  piperacillin/tazobactam IVPB.. 3.375 Gram(s) IV Intermittent every 8 hours, 12-22-23 @ 21:22, Stop order after: 7 Days  piperacillin/tazobactam IVPB... 3.375 Gram(s) IV Intermittent once, 12-22-23 @ 19:22, Stop order after: 1 Doses  vancomycin  IVPB. 1000 milliGRAM(s) IV Intermittent once, 12-22-23 @ 19:22, Stop order after: 1 Doses      Heme Medications   aspirin  chewable 81 milliGRAM(s) Oral daily, 12-22-23 @ 20:42  clopidogrel Tablet 75 milliGRAM(s) Oral daily, 12-22-23 @ 20:43  enoxaparin Injectable 40 milliGRAM(s) SubCutaneous every 24 hours, 12-22-23 @ 21:24      GI Medications  aluminum hydroxide/magnesium hydroxide/simethicone Suspension 30 milliLiter(s) Oral every 4 hours, 12-22-23 @ 21:20, Routine PRN        Home Medications:  Last Order Reconciliation Date: 12-23-23 @ 00:02 (Admission Reconciliation)  aspirin 81 mg oral capsule: 1 cap(s) orally once a day  (12-22-23 @ 15:40)  Bumex 2 mg oral tablet: 1 tab(s) orally 2 times a day (12-22-23 @ 15:40)  Flomax 0.4 mg oral capsule: 2 cap(s) orally once a day (12-22-23 @ 21:18)  Metoprolol Succinate ER 25 mg oral tablet, extended release: 1 tab(s) orally every 12 hours (12-22-23 @ 15:40)  mirtazapine 15 mg oral tablet: 2 tab(s) orally once a day (at bedtime)  (12-22-23 @ 15:40)  Plavix 75 mg oral tablet: 1 tab(s) orally once a day  (12-22-23 @ 15:40)  predniSONE 10 mg oral tablet: 3 tab(s) orally once a day 30 mg oral x 2 days, then 20 mg oral x 2 days, then 10 mg oral x 2 days, then stop (12-22-23 @ 15:40)  PROzac 20 mg oral capsule: 1 cap(s) orally every other day (12-22-23 @ 15:40)  PROzac 40 mg oral capsule: 1 cap(s) orally every other day (12-22-23 @ 15:40)  Restoril 15 mg oral capsule: 1 cap(s) orally once a day (at bedtime) as needed for  insomnia (12-22-23 @ 15:40)  rosuvastatin 20 mg oral tablet: 1 tab(s) orally once a day (at bedtime) (12-22-23 @ 16:04)      LABS:                        11.1   8.78  )-----------( 138      ( 23 Dec 2023 06:50 )             33.1     12-23    134<L>  |  98  |  83<H>  ----------------------------<  123<H>  3.9   |  25  |  1.79<H>    Ca    9.2      23 Dec 2023 06:50    TPro  7.0  /  Alb  2.9<L>  /  TBili  1.7<H>  /  DBili  x   /  AST  68<H>  /  ALT  76<H>  /  AlkPhos  278<H>  12-23    HIT ab -- 12-22 @ 16:35  HIT ab EIA --  D Dimer -444          PT/INR - ( 22 Dec 2023 16:35 )   PT: 12.7 sec;   INR: 1.12 ratio         PTT - ( 22 Dec 2023 16:35 )  PTT:29.7 sec  Urinalysis Basic - ( 23 Dec 2023 06:50 )    Color: x / Appearance: x / SG: x / pH: x  Gluc: 123 mg/dL / Ketone: x  / Bili: x / Urobili: x   Blood: x / Protein: x / Nitrite: x   Leuk Esterase: x / RBC: x / WBC x   Sq Epi: x / Non Sq Epi: x / Bacteria: x              CULTURES: (if applicable)    Rapid RVP Result: Detected (12-22-23 @ 21:20)  Rapid RVP Result: Detected (12-18-23 @ 15:55)       RADIOLOGY  CXR:  < from: Xray Chest 1 View-PORTABLE IMMEDIATE (Xray Chest 1 View-PORTABLE IMMEDIATE .) (12.22.23 @ 22:41) >    Findings:    No change in tethering of the right lower pleura. The lungs are clear.   There are no pleural effusions. The cardiac silhouette is enlarged. Bones   are grossly normal.    IMPRESSION: Cardiomegaly.    --- End of Report ---    < end of copied text >      CT:  < from: CT Chest No Cont (12.18.23 @ 18:44) >    IMPRESSION:    1. Post right upper lobe lobectomy. No evidence of local disease   recurrence.  2. Findings compatible with small large airways inflammation more   pronounced when compared to prior imaging.  3. A nodular lesion within the apical posterior segment of the left upper   lobe which appears more nodular configuration measuring 32 x 20 mm   (333:142). Findings concerning for left upper lobe neoplasm and   correlation with whole-body PET/CT is suggested.  4. Bibasilar pleural effusions which have decreased slightly in extent on   the right side.  5. Mediastinal as well as paraesophageal lymphadenopath slightly more   pronounced.  6. New groundglass opacity within the superior aspect of the residual   right middle lobe. Short interval surveillance in 3-6 months is suggested.        --- End of Report ---    < end of copied text >    ECHO:    < from: TTE Echo Complete w/o Contrast w/ Doppler (12.19.23 @ 08:34) >    Summary:   1. Severely decreased global left ventricular systolic function.   2. Left ventricular ejection fraction, by visual estimation, is 25 to   30%.   3. Severe global left ventricle hypokinesis with regional variation; the   entire apex and mid inferoseptal wall appear akinetic.   4. Mildly increased LV wall thickness.   5. Normal left ventricular internal cavity size.   6. Severely enlarged left atrium.   7. Right atrial enlargement.   8. Mild mitral annular calcification.   9. Moderate mitral valve regurgitation.  10. Moderate tricuspid regurgitation.  11. Mild aortic regurgitation.  12. No aortic valve stenosis.  13. Mild pulmonic valve regurgitation.  14. Top normal sized aortaat the Sinuses of Valsalva (3.9 cm).  15. Estimated pulmonary artery systolic pressure is 43.7 mmHg assuming a   right atrial pressure of 15 mmHg, which is consistent with mild pulmonary   hypertension.  16. There is no evidence of pericardial effusion.    [id number] [facility] Hospitalb[facility] Gilliam MD Electronically signed on   12/19/2023 at 1:09:05 PM      < end of copied text >    vq    < from: NM Pulmonary Ventilation/Perfusion Scan (12.23.23 @ 10:57) >    IMPRESSION:    Low probability of pulmonary embolus.      < end of copied text >    VITALS:  T(C): 36.9 (12-23-23 @ 05:16), Max: 36.9 (12-23-23 @ 05:16)  T(F): 98.4 (12-23-23 @ 05:16), Max: 98.4 (12-23-23 @ 05:16)  HR: 59 (12-23-23 @ 12:30) (59 - 88)  BP: 89/49 (12-23-23 @ 12:30) (89/49 - 116/65)  BP(mean): --  ABP: --  ABP(mean): --  RR: 17 (12-23-23 @ 12:30) (17 - 18)  SpO2: 99% (12-23-23 @ 12:30) (95% - 99%)  CVP(mm Hg): --  CVP(cm H2O): --    Ins and Outs     12-23-23 @ 07:01  -  12-23-23 @ 12:38  --------------------------------------------------------  IN: 120 mL / OUT: 700 mL / NET: -580 mL        Height (cm): 182.9 (12-23-23 @ 09:44)  Weight (kg): 83.9 (12-23-23 @ 09:44)  BMI (kg/m2): 25.1 (12-23-23 @ 09:44)        I&O's Detail    23 Dec 2023 07:01  -  23 Dec 2023 12:38  --------------------------------------------------------  IN:    Oral Fluid: 120 mL  Total IN: 120 mL    OUT:    Voided (mL): 700 mL  Total OUT: 700 mL    Total NET: -580 mL

## 2023-12-23 NOTE — DISCHARGE NOTE PROVIDER - NSDCCPCAREPLAN_GEN_ALL_CORE_FT
PRINCIPAL DISCHARGE DIAGNOSIS  Diagnosis: Shortness of breath  Assessment and Plan of Treatment: You were seen in the hospital with shortness of breath. You improved symptomatically and are now ready for discharge home. please follow up with your primary care doctor and pulmonologist. Please take your medications as precribed. You may restart your prednisone tomorrow.      SECONDARY DISCHARGE DIAGNOSES  Diagnosis: Dyspnea on exertion  Assessment and Plan of Treatment:     Diagnosis: CHF exacerbation  Assessment and Plan of Treatment:      PRINCIPAL DISCHARGE DIAGNOSIS  Diagnosis: Shortness of breath  Assessment and Plan of Treatment: You were seen in the hospital with shortness of breath. You improved symptomatically and are now ready for discharge home. please follow up with your primary care doctor and pulmonologist. Please take your medications as precribed. You may restart your prednisone tomorrow.

## 2023-12-23 NOTE — DISCHARGE NOTE PROVIDER - NSDCMRMEDTOKEN_GEN_ALL_CORE_FT
aspirin 81 mg oral capsule: 1 cap(s) orally once a day   Bumex 2 mg oral tablet: 1 tab(s) orally 2 times a day  Flomax 0.4 mg oral capsule: 2 cap(s) orally once a day  Metoprolol Succinate ER 25 mg oral tablet, extended release: 1 tab(s) orally every 12 hours  mirtazapine 15 mg oral tablet: 2 tab(s) orally once a day (at bedtime)   Plavix 75 mg oral tablet: 1 tab(s) orally once a day   predniSONE 10 mg oral tablet: 3 tab(s) orally once a day 30 mg oral x 2 days, then 20 mg oral x 2 days, then 10 mg oral x 2 days, then stop  PROzac 20 mg oral capsule: 1 cap(s) orally every other day  PROzac 40 mg oral capsule: 1 cap(s) orally every other day  Restoril 15 mg oral capsule: 1 cap(s) orally once a day (at bedtime) as needed for  insomnia  rosuvastatin 20 mg oral tablet: 1 tab(s) orally once a day (at bedtime)   aspirin 81 mg oral capsule: 1 cap(s) orally once a day   Bumex 2 mg oral tablet: 1 tab(s) orally 2 times a day  Flomax 0.4 mg oral capsule: 2 cap(s) orally once a day  Home PT: Please provide patient with home PT  Metoprolol Succinate ER 25 mg oral tablet, extended release: 1 tab(s) orally every 12 hours  mirtazapine 15 mg oral tablet: 2 tab(s) orally once a day (at bedtime)   Plavix 75 mg oral tablet: 1 tab(s) orally once a day   predniSONE 10 mg oral tablet: 3 tab(s) orally once a day 30 mg oral x 2 days, then 20 mg oral x 2 days, then 10 mg oral x 2 days, then stop  PROzac 20 mg oral capsule: 1 cap(s) orally every other day  PROzac 40 mg oral capsule: 1 cap(s) orally every other day  Restoril 15 mg oral capsule: 1 cap(s) orally once a day (at bedtime) as needed for  insomnia  rosuvastatin 20 mg oral tablet: 1 tab(s) orally once a day (at bedtime)

## 2023-12-23 NOTE — DISCHARGE NOTE NURSING/CASE MANAGEMENT/SOCIAL WORK - NSDCPEFALRISK_GEN_ALL_CORE
For information on Fall & Injury Prevention, visit: [email or web address] OR  [email or web address] OR  https://www.cdc.gov/steadi/patient.html For information on Fall & Injury Prevention, visit: [email or web address] OR  [email or web address] OR  https://www.cdc.gov/steadi/patient.html

## 2023-12-23 NOTE — DISCHARGE NOTE PROVIDER - CARE PROVIDERS DIRECT ADDRESSES
[email or web address]

## 2023-12-23 NOTE — PROGRESS NOTE ADULT - PROBLEM SELECTOR PLAN 4
-creatinine at baseline (baseline ~1.6)  -continue to monitor  -avoid nephrotoxic agents, hypotension

## 2023-12-23 NOTE — DISCHARGE NOTE PROVIDER - NSDCCAREPROVSEEN_GEN_ALL_CORE_FT
Svetlana, Becka Alejandre, Nohemy Sánchez, Jericho Wynne, Renuka Escalante, Bridger Alcazar, Maria Monk

## 2023-12-23 NOTE — PROGRESS NOTE ADULT - ASSESSMENT
78M PMHx of CAD, lung adenocarcinoma s/p RUL lobectomy 09/2022, CHFrEF, HTN, HLD, depression presenting with shortness of breath x few days. Was admitted 12/18 - 12/21/23 for RSV treated solumedrol 40 iv and d/c with prednisone taper, he took 30 mg pred today, now comes with c/o having more difficulty breathing since this am while at rest and worsened while on exertion. Also has been noticing worsening LE pitting edema b/l since last admission   A/w mild cough, nonproductive. Denies any chest pain, abdominal pain,  nausea/vomiting, headaches, fevers, chills, diarrhea  weakness, syncope, hematuria, dysuria, urinary symptoms, subjective neurological deficits.    worsening sob likely due to worsening underlying  HARRY infiltrate and recent RSV infection 12/18/23 in the setting of lung cancer, no hypoxia  admit to tele  cxr with worsening HARRY findings and elevated trops and bnp  ct chest - noted as above  trend trops- elevated likely demand  d dimer mildly elevated- unable to get contrast will order vq scan for am  recent echo as above  diuretics  am labs  BNP elevated since 12/18/23, repeat BNP in am   will treat with epimeric abtx- ZOSYN , reassess in am  pulm dr. rodriguez consult for am  hold prednisone due to fluid retention    hyponatremia  likely due to chf, diuretics  monitor  1L, fluid restriction     transaminitis elevated bili- likely chronic in the setting of lung cancer   s/p radiation , last dose march/23  NM PET/CT Onc FDG Skull to Thigh, Subsq (08.08.23 @ 18:00) >Redemonstration of a left upper lobe lobulated lung nodule that is stable in size (anatomically) and has slightly decreased metabolically (FDG uptake). Continued follow-up as clinically indicated  scheduled for PET scan jan 8 2024    ckd 3-  creat baseline  will monitor    worsening  leg edema in the setting of h/o HFrEF  Diuretics  f/u BNP  doppler r/o DVT    HFrEF, CAD  echo as above  diuretics, bb, asa, Plavix  home meds- Bumex will change to Lasix iv 40 qd    HLD- statin    BPH- Flomax    depression- Prozac    vte ppx- Lovenox       78M PMHx of CAD, lung adenocarcinoma s/p RUL lobectomy 09/2022, CHFrEF, HTN, HLD, depression presenting with shortness of breath x few days. Was admitted 12/18 - 12/21/23 for RSV treated solumedrol 40 iv and d/c with prednisone taper, he took 30 mg pred today, now comes with c/o having more difficulty breathing, likely 2/2 RSV, r/o PE.     d/w Dr. Sánchez

## 2023-12-23 NOTE — PATIENT PROFILE ADULT - FUNCTIONAL ASSESSMENT - BASIC MOBILITY 6.
4-calculated by average/Not able to assess (calculate score using [facility] averaging method) 4-calculated by average/Not able to assess (calculate score using [facility] averaging method)

## 2023-12-23 NOTE — DISCHARGE NOTE PROVIDER - HOSPITAL COURSE
78M PMHx of CAD, lung adenocarcinoma s/p RUL lobectomy 09/2022, CHFrEF, HTN, HLD, depression presenting with shortness of breath x few days. Was admitted 12/18 - 12/21/23 for RSV treated solumedrol 40 iv and d/c with prednisone taper, now comes with c/o having more difficulty breathing, likely 2/2 RSV, r/o PE. Patient was seen by pulmonology. V/Q scan and b/l LE venous dopplers were both negative. Patient improved symptomatically, is not requiring oxygen and is now medically optimized for discharge home [facility] home PT and follow up with his PCP and pulmonologist.     Overnight Events: None   Interval History: Patient was seen and examined by me this morning at bedside. Patient feels well and would like to go home.     REVIEW OF SYSTEMS:  CONSTITUTIONAL: No weakness, fevers or chills  EYES/ENT: No visual changes;  No vertigo or throat pain   NECK: No pain or stiffness  RESPIRATORY: No cough, wheezing, hemoptysis; No shortness of breath  CARDIOVASCULAR: No chest pain or palpitations  GASTROINTESTINAL: No abdominal or epigastric pain. No nausea, vomiting; No diarrhea or constipation.   GENITOURINARY: No dysuria, frequency or hematuria  NEUROLOGICAL: No numbness or weakness  SKIN: No itching, burning, rashes, or lesions       Vital Signs Last 24 Hrs  T(C): 36.9 (23 Dec 2023 05:16), Max: 36.9 (23 Dec 2023 05:16)  T(F): 98.4 (23 Dec 2023 05:16), Max: 98.4 (23 Dec 2023 05:16)  HR: 66 (23 Dec 2023 05:16) (61 - 88)  BP: 109/63 (23 Dec 2023 05:16) (109/58 - 116/65)  BP(mean): --  RR: 17 (23 Dec 2023 05:16) (17 - 18)  SpO2: 95% (23 Dec 2023 05:16) (95% - 98%)    Parameters below as of 23 Dec 2023 05:16  Patient On (Oxygen Delivery Method): room airPHYSICAL EXAM  Constitutional: Pt lying in bed, awake and alert, NAD  HEENT: EOMI, normocephalic, moist mucous membranes  Neck: Soft and supple  Respiratory: CTABL, No wheezing, rales or rhonchi  Cardiovascular: S1S2+, RRR, no M/G/R  Gastrointestinal: BS+, soft, NT/ND, no guarding, no rebound  Extremities: No calf pain. Trace LE edema b/l  Vascular: Peripheral pulses present  Neurological: AAOx3, no focal deficits  Musculoskeletal: Normal muscle tone, no atrophy, no rigidity, no contractions  Skin: No significant new skin lesions or rashes      RADIOLOGY/EKG (personally reviewed):  EKG: Sinus rhythm with 1st degree AV block with frequent premature ventricular complexes Possible Left atrial enlargement Left axis deviation Low voltage QRS Incomplete right bundle branch block Cannot rule out Anteroseptal infarct (cited on or before 11-MAY-2022) Abnormal ECG When compared with ECG of 19-DEC-2023 09:34, premature ventricular complexes are now present Confirmed by LUTHER COLON, ERNA TABOR ([id number]) on 12/23/2023 1:33:50 PM  < from: Xray Chest 1 View-PORTABLE IMMEDIATE (Xray Chest 1 View-PORTABLE IMMEDIATE .) (12.22.23 @ 22:41) >  Findings:    No change in tethering of the right lower pleura. The lungs are clear.   There are no pleural effusions. The cardiac silhouette is enlarged. Bones   are grossly normal.    IMPRESSION: Cardiomegaly.    --- End of Report ---    < end of copied text >  < from: NM Pulmonary Ventilation/Perfusion Scan (12.23.23 @ 10:57) >    FINDINGS: There is heterogeneous distribution of radiopharmaceuticals in   both lungs on the ventilation and perfusion images. Overall, ventilation   is more heterogeneous than perfusion and there are multiple matched   defects. Some defects appear worse on ventilation. There are no   mismatched segmental perfusion defects.    IMPRESSION:    Low probability of pulmonary embolus.    < end of copied text >       78M PMHx of CAD, lung adenocarcinoma s/p RUL lobectomy 09/2022, CHFrEF, HTN, HLD, depression presenting with shortness of breath x few days. Was admitted 12/18 - 12/21/23 for RSV treated solumedrol 40 iv and d/c with prednisone taper, now comes with c/o having more difficulty breathing, likely 2/2 RSV, r/o PE. Patient was seen by pulmonology. V/Q scan and b/l LE venous dopplers were both negative. Patient improved symptomatically, is not requiring oxygen and is now medically optimized for discharge home [facility] home PT and follow up with his PCP and pulmonologist.     Overnight Events: None   Interval History: Patient was seen and examined by me this morning at bedside. Patient feels well and would like to go home.     REVIEW OF SYSTEMS:  CONSTITUTIONAL: No weakness, fevers or chills  EYES/ENT: No visual changes;  No vertigo or throat pain   NECK: No pain or stiffness  RESPIRATORY: No cough, wheezing, hemoptysis; No shortness of breath  CARDIOVASCULAR: No chest pain or palpitations  GASTROINTESTINAL: No abdominal or epigastric pain. No nausea, vomiting; No diarrhea or constipation.   GENITOURINARY: No dysuria, frequency or hematuria  NEUROLOGICAL: No numbness or weakness  SKIN: No itching, burning, rashes, or lesions       Vital Signs Last 24 Hrs  T(C): 36.9 (23 Dec 2023 05:16), Max: 36.9 (23 Dec 2023 05:16)  T(F): 98.4 (23 Dec 2023 05:16), Max: 98.4 (23 Dec 2023 05:16)  HR: 66 (23 Dec 2023 05:16) (61 - 88)  BP: 109/63 (23 Dec 2023 05:16) (109/58 - 116/65)  BP(mean): --  RR: 17 (23 Dec 2023 05:16) (17 - 18)  SpO2: 95% (23 Dec 2023 05:16) (95% - 98%)    Parameters below as of 23 Dec 2023 05:16  Patient On (Oxygen Delivery Method): room airPHYSICAL EXAM  Constitutional: Pt lying in bed, awake and alert, NAD  HEENT: EOMI, normocephalic, moist mucous membranes  Neck: Soft and supple  Respiratory: CTABL, No wheezing, rales or rhonchi  Cardiovascular: S1S2+, RRR, no M/G/R  Gastrointestinal: BS+, soft, NT/ND, no guarding, no rebound  Extremities: No calf pain. Trace LE edema b/l  Vascular: Peripheral pulses present  Neurological: AAOx3, no focal deficits  Musculoskeletal: Normal muscle tone, no atrophy, no rigidity, no contractions  Skin: No significant new skin lesions or rashes      RADIOLOGY/EKG (personally reviewed):  EKG: Sinus rhythm with 1st degree AV block with frequent premature ventricular complexes Possible Left atrial enlargement Left axis deviation Low voltage QRS Incomplete right bundle branch block Cannot rule out Anteroseptal infarct (cited on or before 11-MAY-2022) Abnormal ECG When compared with ECG of 19-DEC-2023 09:34, premature ventricular complexes are now present Confirmed by LUTHER COLON, ERNA TABOR ([id number]) on 12/23/2023 1:33:50 PM  < from: Xray Chest 1 View-PORTABLE IMMEDIATE (Xray Chest 1 View-PORTABLE IMMEDIATE .) (12.22.23 @ 22:41) >  Findings:    No change in tethering of the right lower pleura. The lungs are clear.   There are no pleural effusions. The cardiac silhouette is enlarged. Bones   are grossly normal.    IMPRESSION: Cardiomegaly.    --- End of Report ---    < end of copied text >  < from: NM Pulmonary Ventilation/Perfusion Scan (12.23.23 @ 10:57) >    FINDINGS: There is heterogeneous distribution of radiopharmaceuticals in   both lungs on the ventilation and perfusion images. Overall, ventilation   is more heterogeneous than perfusion and there are multiple matched   defects. Some defects appear worse on ventilation. There are no   mismatched segmental perfusion defects.    IMPRESSION:    Low probability of pulmonary embolus.    < end of copied text >       78M PMHx of CAD, lung adenocarcinoma s/p RUL lobectomy 09/2022, CHFrEF, HTN, HLD, depression presenting with shortness of breath x few days. Was admitted 12/18 - 12/21/23 for RSV treated solumedrol 40 iv and d/c with prednisone taper, now comes with c/o having more difficulty breathing, likely 2/2 RSV, r/o PE. Patient was seen by pulmonology. V/Q scan awas negative. Patient improved symptomatically, is not requiring oxygen and is now medically optimized for discharge home [facility] home PT and follow up with his PCP and pulmonologist.     Overnight Events: None   Interval History: Patient was seen and examined by me this morning at bedside. Patient feels well and would like to go home.     REVIEW OF SYSTEMS:  CONSTITUTIONAL: No weakness, fevers or chills  EYES/ENT: No visual changes;  No vertigo or throat pain   NECK: No pain or stiffness  RESPIRATORY: No cough, wheezing, hemoptysis; No shortness of breath  CARDIOVASCULAR: No chest pain or palpitations  GASTROINTESTINAL: No abdominal or epigastric pain. No nausea, vomiting; No diarrhea or constipation.   GENITOURINARY: No dysuria, frequency or hematuria  NEUROLOGICAL: No numbness or weakness  SKIN: No itching, burning, rashes, or lesions       Vital Signs Last 24 Hrs  T(C): 36.9 (23 Dec 2023 05:16), Max: 36.9 (23 Dec 2023 05:16)  T(F): 98.4 (23 Dec 2023 05:16), Max: 98.4 (23 Dec 2023 05:16)  HR: 66 (23 Dec 2023 05:16) (61 - 88)  BP: 109/63 (23 Dec 2023 05:16) (109/58 - 116/65)  BP(mean): --  RR: 17 (23 Dec 2023 05:16) (17 - 18)  SpO2: 95% (23 Dec 2023 05:16) (95% - 98%)    Parameters below as of 23 Dec 2023 05:16  Patient On (Oxygen Delivery Method): room airPHYSICAL EXAM  Constitutional: Pt lying in bed, awake and alert, NAD  HEENT: EOMI, normocephalic, moist mucous membranes  Neck: Soft and supple  Respiratory: CTABL, No wheezing, rales or rhonchi  Cardiovascular: S1S2+, RRR, no M/G/R  Gastrointestinal: BS+, soft, NT/ND, no guarding, no rebound  Extremities: No calf pain. Trace LE edema b/l  Vascular: Peripheral pulses present  Neurological: AAOx3, no focal deficits  Musculoskeletal: Normal muscle tone, no atrophy, no rigidity, no contractions  Skin: No significant new skin lesions or rashes      RADIOLOGY/EKG (personally reviewed):  EKG: Sinus rhythm with 1st degree AV block with frequent premature ventricular complexes Possible Left atrial enlargement Left axis deviation Low voltage QRS Incomplete right bundle branch block Cannot rule out Anteroseptal infarct (cited on or before 11-MAY-2022) Abnormal ECG When compared with ECG of 19-DEC-2023 09:34, premature ventricular complexes are now present Confirmed by LUTHER COLON, ERNA TABOR ([id number]) on 12/23/2023 1:33:50 PM  < from: Xray Chest 1 View-PORTABLE IMMEDIATE (Xray Chest 1 View-PORTABLE IMMEDIATE .) (12.22.23 @ 22:41) >  Findings:    No change in tethering of the right lower pleura. The lungs are clear.   There are no pleural effusions. The cardiac silhouette is enlarged. Bones   are grossly normal.    IMPRESSION: Cardiomegaly.    --- End of Report ---    < end of copied text >  < from: NM Pulmonary Ventilation/Perfusion Scan (12.23.23 @ 10:57) >    FINDINGS: There is heterogeneous distribution of radiopharmaceuticals in   both lungs on the ventilation and perfusion images. Overall, ventilation   is more heterogeneous than perfusion and there are multiple matched   defects. Some defects appear worse on ventilation. There are no   mismatched segmental perfusion defects.    IMPRESSION:    Low probability of pulmonary embolus.    < end of copied text >       78M PMHx of CAD, lung adenocarcinoma s/p RUL lobectomy 09/2022, CHFrEF, HTN, HLD, depression presenting with shortness of breath x few days. Was admitted 12/18 - 12/21/23 for RSV treated solumedrol 40 iv and d/c with prednisone taper, now comes with c/o having more difficulty breathing, likely 2/2 RSV, r/o PE. Patient was seen by pulmonology. V/Q scan awas negative. Patient improved symptomatically, is not requiring oxygen and is now medically optimized for discharge home [facility] home PT and follow up with his PCP and pulmonologist.     Overnight Events: None   Interval History: Patient was seen and examined by me this morning at bedside. Patient feels well and would like to go home.     REVIEW OF SYSTEMS:  CONSTITUTIONAL: No weakness, fevers or chills  EYES/ENT: No visual changes;  No vertigo or throat pain   NECK: No pain or stiffness  RESPIRATORY: No cough, wheezing, hemoptysis; No shortness of breath  CARDIOVASCULAR: No chest pain or palpitations  GASTROINTESTINAL: No abdominal or epigastric pain. No nausea, vomiting; No diarrhea or constipation.   GENITOURINARY: No dysuria, frequency or hematuria  NEUROLOGICAL: No numbness or weakness  SKIN: No itching, burning, rashes, or lesions       Vital Signs Last 24 Hrs  T(C): 36.9 (23 Dec 2023 05:16), Max: 36.9 (23 Dec 2023 05:16)  T(F): 98.4 (23 Dec 2023 05:16), Max: 98.4 (23 Dec 2023 05:16)  HR: 66 (23 Dec 2023 05:16) (61 - 88)  BP: 109/63 (23 Dec 2023 05:16) (109/58 - 116/65)  BP(mean): --  RR: 17 (23 Dec 2023 05:16) (17 - 18)  SpO2: 95% (23 Dec 2023 05:16) (95% - 98%)    Parameters below as of 23 Dec 2023 05:16  Patient On (Oxygen Delivery Method): room airPHYSICAL EXAM  Constitutional: Pt lying in bed, awake and alert, NAD  HEENT: EOMI, normocephalic, moist mucous membranes  Neck: Soft and supple  Respiratory: CTABL, No wheezing, rales or rhonchi  Cardiovascular: S1S2+, RRR, no M/G/R  Gastrointestinal: BS+, soft, NT/ND, no guarding, no rebound  Extremities: No calf pain. Trace LE edema b/l  Vascular: Peripheral pulses present  Neurological: AAOx3, no focal deficits  Musculoskeletal: Normal muscle tone, no atrophy, no rigidity, no contractions  Skin: No significant new skin lesions or rashes      RADIOLOGY/EKG (personally reviewed):  EKG: Sinus rhythm with 1st degree AV block with frequent premature ventricular complexes Possible Left atrial enlargement Left axis deviation Low voltage QRS Incomplete right bundle branch block Cannot rule out Anteroseptal infarct (cited on or before 11-MAY-2022) Abnormal ECG When compared with ECG of 19-DEC-2023 09:34, premature ventricular complexes are now present Confirmed by LUTHER COLON, ERNA TABOR ([id number]) on 12/23/2023 1:33:50 PM  < from: Xray Chest 1 View-PORTABLE IMMEDIATE (Xray Chest 1 View-PORTABLE IMMEDIATE .) (12.22.23 @ 22:41) >  Findings:    No change in tethering of the right lower pleura. The lungs are clear.   There are no pleural effusions. The cardiac silhouette is enlarged. Bones   are grossly normal.    IMPRESSION: Cardiomegaly.    --- End of Report ---    < end of copied text >  < from: NM Pulmonary Ventilation/Perfusion Scan (12.23.23 @ 10:57) >    FINDINGS: There is heterogeneous distribution of radiopharmaceuticals in   both lungs on the ventilation and perfusion images. Overall, ventilation   is more heterogeneous than perfusion and there are multiple matched   defects. Some defects appear worse on ventilation. There are no   mismatched segmental perfusion defects.    IMPRESSION:    Low probability of pulmonary embolus.    < end of copied text >       78M PMHx of CAD, lung adenocarcinoma s/p RUL lobectomy 09/2022, CHFrEF, HTN, HLD, depression presenting with shortness of breath x few days. Was admitted 12/18 - 12/21/23 for RSV treated solumedrol 40 iv and d/c with prednisone taper, now comes with c/o having more difficulty breathing, likely 2/2 RSV, r/o PE. Patient was seen by pulmonology. V/Q scan was negative. Patient improved symptomatically, is not requiring oxygen and is now medically optimized for discharge home with home PT and follow up with his PCP and pulmonologist. He will continue his prednisone taper from previous admission.     Overnight Events: None   Interval History: Patient was seen and examined by me this morning at bedside. Patient feels well and would like to go home.     REVIEW OF SYSTEMS:  CONSTITUTIONAL: No weakness, fevers or chills  EYES/ENT: No visual changes;  No vertigo or throat pain   NECK: No pain or stiffness  RESPIRATORY: No cough, wheezing, hemoptysis; No shortness of breath  CARDIOVASCULAR: No chest pain or palpitations  GASTROINTESTINAL: No abdominal or epigastric pain. No nausea, vomiting; No diarrhea or constipation.   GENITOURINARY: No dysuria, frequency or hematuria  NEUROLOGICAL: No numbness or weakness  SKIN: No itching, burning, rashes, or lesions       Vital Signs Last 24 Hrs  T(C): 36.9 (23 Dec 2023 05:16), Max: 36.9 (23 Dec 2023 05:16)  T(F): 98.4 (23 Dec 2023 05:16), Max: 98.4 (23 Dec 2023 05:16)  HR: 66 (23 Dec 2023 05:16) (61 - 88)  BP: 109/63 (23 Dec 2023 05:16) (109/58 - 116/65)  BP(mean): --  RR: 17 (23 Dec 2023 05:16) (17 - 18)  SpO2: 95% (23 Dec 2023 05:16) (95% - 98%)    Parameters below as of 23 Dec 2023 05:16  Patient On (Oxygen Delivery Method): room airPHYSICAL EXAM  Constitutional: Pt lying in bed, awake and alert, NAD  HEENT: EOMI, normocephalic, moist mucous membranes  Neck: Soft and supple  Respiratory: CTABL, No wheezing, rales or rhonchi  Cardiovascular: S1S2+, RRR, no M/G/R  Gastrointestinal: BS+, soft, NT/ND, no guarding, no rebound  Extremities: No calf pain. Trace LE edema b/l  Vascular: Peripheral pulses present  Neurological: AAOx3, no focal deficits  Musculoskeletal: Normal muscle tone, no atrophy, no rigidity, no contractions  Skin: No significant new skin lesions or rashes      RADIOLOGY/EKG (personally reviewed):  EKG: Sinus rhythm with 1st degree AV block with frequent premature ventricular complexes Possible Left atrial enlargement Left axis deviation Low voltage QRS Incomplete right bundle branch block Cannot rule out Anteroseptal infarct (cited on or before 11-MAY-2022) Abnormal ECG When compared with ECG of 19-DEC-2023 09:34, premature ventricular complexes are now present Confirmed by LUTHER COLON, ERNA TABOR ([id number]) on 12/23/2023 1:33:50 PM  < from: Xray Chest 1 View-PORTABLE IMMEDIATE (Xray Chest 1 View-PORTABLE IMMEDIATE .) (12.22.23 @ 22:41) >  Findings:    No change in tethering of the right lower pleura. The lungs are clear.   There are no pleural effusions. The cardiac silhouette is enlarged. Bones   are grossly normal.    IMPRESSION: Cardiomegaly.    --- End of Report ---    < end of copied text >  < from: NM Pulmonary Ventilation/Perfusion Scan (12.23.23 @ 10:57) >    FINDINGS: There is heterogeneous distribution of radiopharmaceuticals in   both lungs on the ventilation and perfusion images. Overall, ventilation   is more heterogeneous than perfusion and there are multiple matched   defects. Some defects appear worse on ventilation. There are no   mismatched segmental perfusion defects.    IMPRESSION:    Low probability of pulmonary embolus.    < end of copied text >       78M PMHx of CAD, lung adenocarcinoma s/p RUL lobectomy 09/2022, CHFrEF, HTN, HLD, depression presenting with shortness of breath x few days. Was admitted 12/18 - 12/21/23 for RSV treated solumedrol 40 iv and d/c with prednisone taper, now comes with c/o having more difficulty breathing, likely 2/2 RSV, r/o PE. Patient was seen by pulmonology. V/Q scan was negative. Patient improved symptomatically, is not requiring oxygen and is now medically optimized for discharge home with home PT and follow up with his PCP and pulmonologist. He will continue his prednisone taper from previous admission.     Based on my assessment, this patient’s condition has improved and no longer warrants inpatient status and will be changed to outpatient status prior to being discharged from the hospital.  This decision is based on the following reasons: The patient is not requiring oxygen, has no pulmonary embolism on VQ scan and is symptomatically improved. No longer requiring an IV medications.     Overnight Events: None   Interval History: Patient was seen and examined by me this morning at bedside. Patient feels well and would like to go home.     REVIEW OF SYSTEMS:  CONSTITUTIONAL: No weakness, fevers or chills  EYES/ENT: No visual changes;  No vertigo or throat pain   NECK: No pain or stiffness  RESPIRATORY: No cough, wheezing, hemoptysis; No shortness of breath  CARDIOVASCULAR: No chest pain or palpitations  GASTROINTESTINAL: No abdominal or epigastric pain. No nausea, vomiting; No diarrhea or constipation.   GENITOURINARY: No dysuria, frequency or hematuria  NEUROLOGICAL: No numbness or weakness  SKIN: No itching, burning, rashes, or lesions       Vital Signs Last 24 Hrs  T(C): 36.9 (23 Dec 2023 05:16), Max: 36.9 (23 Dec 2023 05:16)  T(F): 98.4 (23 Dec 2023 05:16), Max: 98.4 (23 Dec 2023 05:16)  HR: 66 (23 Dec 2023 05:16) (61 - 88)  BP: 109/63 (23 Dec 2023 05:16) (109/58 - 116/65)  BP(mean): --  RR: 17 (23 Dec 2023 05:16) (17 - 18)  SpO2: 95% (23 Dec 2023 05:16) (95% - 98%)    Parameters below as of 23 Dec 2023 05:16  Patient On (Oxygen Delivery Method): room airPHYSICAL EXAM  Constitutional: Pt lying in bed, awake and alert, NAD  HEENT: EOMI, normocephalic, moist mucous membranes  Neck: Soft and supple  Respiratory: CTABL, No wheezing, rales or rhonchi  Cardiovascular: S1S2+, RRR, no M/G/R  Gastrointestinal: BS+, soft, NT/ND, no guarding, no rebound  Extremities: No calf pain. Trace LE edema b/l  Vascular: Peripheral pulses present  Neurological: AAOx3, no focal deficits  Musculoskeletal: Normal muscle tone, no atrophy, no rigidity, no contractions  Skin: No significant new skin lesions or rashes      RADIOLOGY/EKG (personally reviewed):  EKG: Sinus rhythm with 1st degree AV block with frequent premature ventricular complexes Possible Left atrial enlargement Left axis deviation Low voltage QRS Incomplete right bundle branch block Cannot rule out Anteroseptal infarct (cited on or before 11-MAY-2022) Abnormal ECG When compared with ECG of 19-DEC-2023 09:34, premature ventricular complexes are now present Confirmed by LUTHER COLON, ERNA TABOR ([id number]) on 12/23/2023 1:33:50 PM  < from: Xray Chest 1 View-PORTABLE IMMEDIATE (Xray Chest 1 View-PORTABLE IMMEDIATE .) (12.22.23 @ 22:41) >  Findings:    No change in tethering of the right lower pleura. The lungs are clear.   There are no pleural effusions. The cardiac silhouette is enlarged. Bones   are grossly normal.    IMPRESSION: Cardiomegaly.    --- End of Report ---    < end of copied text >  < from: NM Pulmonary Ventilation/Perfusion Scan (12.23.23 @ 10:57) >    FINDINGS: There is heterogeneous distribution of radiopharmaceuticals in   both lungs on the ventilation and perfusion images. Overall, ventilation   is more heterogeneous than perfusion and there are multiple matched   defects. Some defects appear worse on ventilation. There are no   mismatched segmental perfusion defects.    IMPRESSION:    Low probability of pulmonary embolus.    < end of copied text >       78M PMHx of CAD, lung adenocarcinoma s/p RUL lobectomy 09/2022, CHFrEF, HTN, HLD, depression presenting with shortness of breath x few days. Was admitted 12/18 - 12/21/23 for RSV treated solumedrol 40 iv and d/c with prednisone taper, now comes with c/o having more difficulty breathing, likely 2/2 RSV, r/o PE. Patient was seen by pulmonology. V/Q scan was negative. Patient improved symptomatically, is not requiring oxygen and is now medically optimized for discharge home with home PT and follow up with his PCP and pulmonologist. He will continue his prednisone taper from previous admission.     Based on my assessment, this patient’s condition has improved and no longer warrants inpatient status and will be changed to outpatient status prior to being discharged from the hospital.  This decision is based on the following reasons: The patient is not requiring oxygen, has no pulmonary embolism on VQ scan and is symptomatically improved. No longer requiring an IV medications.   Discussed with Attending Dr. Sánchez.    Overnight Events: None   Interval History: Patient was seen and examined by me this morning at bedside. Patient feels well and would like to go home.     REVIEW OF SYSTEMS:  CONSTITUTIONAL: No weakness, fevers or chills  EYES/ENT: No visual changes;  No vertigo or throat pain   NECK: No pain or stiffness  RESPIRATORY: No cough, wheezing, hemoptysis; No shortness of breath  CARDIOVASCULAR: No chest pain or palpitations  GASTROINTESTINAL: No abdominal or epigastric pain. No nausea, vomiting; No diarrhea or constipation.   GENITOURINARY: No dysuria, frequency or hematuria  NEUROLOGICAL: No numbness or weakness  SKIN: No itching, burning, rashes, or lesions       Vital Signs Last 24 Hrs  T(C): 36.9 (23 Dec 2023 05:16), Max: 36.9 (23 Dec 2023 05:16)  T(F): 98.4 (23 Dec 2023 05:16), Max: 98.4 (23 Dec 2023 05:16)  HR: 66 (23 Dec 2023 05:16) (61 - 88)  BP: 109/63 (23 Dec 2023 05:16) (109/58 - 116/65)  BP(mean): --  RR: 17 (23 Dec 2023 05:16) (17 - 18)  SpO2: 95% (23 Dec 2023 05:16) (95% - 98%)    Parameters below as of 23 Dec 2023 05:16  Patient On (Oxygen Delivery Method): room airPHYSICAL EXAM  Constitutional: Pt lying in bed, awake and alert, NAD  HEENT: EOMI, normocephalic, moist mucous membranes  Neck: Soft and supple  Respiratory: CTABL, No wheezing, rales or rhonchi  Cardiovascular: S1S2+, RRR, no M/G/R  Gastrointestinal: BS+, soft, NT/ND, no guarding, no rebound  Extremities: No calf pain. Trace LE edema b/l  Vascular: Peripheral pulses present  Neurological: AAOx3, no focal deficits  Musculoskeletal: Normal muscle tone, no atrophy, no rigidity, no contractions  Skin: No significant new skin lesions or rashes      RADIOLOGY/EKG (personally reviewed):  EKG: Sinus rhythm with 1st degree AV block with frequent premature ventricular complexes Possible Left atrial enlargement Left axis deviation Low voltage QRS Incomplete right bundle branch block Cannot rule out Anteroseptal infarct (cited on or before 11-MAY-2022) Abnormal ECG When compared with ECG of 19-DEC-2023 09:34, premature ventricular complexes are now present Confirmed by LUTHER COLON, ERNA TABOR ([id number]) on 12/23/2023 1:33:50 PM  < from: Xray Chest 1 View-PORTABLE IMMEDIATE (Xray Chest 1 View-PORTABLE IMMEDIATE .) (12.22.23 @ 22:41) >  Findings:    No change in tethering of the right lower pleura. The lungs are clear.   There are no pleural effusions. The cardiac silhouette is enlarged. Bones   are grossly normal.    IMPRESSION: Cardiomegaly.    --- End of Report ---    < end of copied text >  < from: NM Pulmonary Ventilation/Perfusion Scan (12.23.23 @ 10:57) >    FINDINGS: There is heterogeneous distribution of radiopharmaceuticals in   both lungs on the ventilation and perfusion images. Overall, ventilation   is more heterogeneous than perfusion and there are multiple matched   defects. Some defects appear worse on ventilation. There are no   mismatched segmental perfusion defects.    IMPRESSION:    Low probability of pulmonary embolus.    < end of copied text >

## 2023-12-23 NOTE — DISCHARGE NOTE PROVIDER - CARE PROVIDER_API CALL
Delroy Boston.  [facility]  [address] [place] [id number]  Phone: [phone]  Fax: [phone]  Follow Up Time:    Delroy Boston.  [facility]  [address] [place] [id number]  Phone: [phone]  Fax: [phone]  Follow Up Time:

## 2023-12-23 NOTE — PROGRESS NOTE ADULT - PROBLEM SELECTOR PROBLEM 10
Received referral for Ambulatory Care Management. Patient will be outreached for CM engagement.    Major depression

## 2023-12-23 NOTE — PROGRESS NOTE ADULT - SUBJECTIVE AND OBJECTIVE BOX
Subjective and Objective:       Overnight Events: None   Interval History: Patient was seen and examined by me this morning at bedside. Patient feels well and would like to go home.     REVIEW OF SYSTEMS:  CONSTITUTIONAL: No weakness, fevers or chills  EYES/ENT: No visual changes;  No vertigo or throat pain   NECK: No pain or stiffness  RESPIRATORY: No cough, wheezing, hemoptysis; No shortness of breath  CARDIOVASCULAR: No chest pain or palpitations  GASTROINTESTINAL: No abdominal or epigastric pain. No nausea, vomiting; No diarrhea or constipation.   GENITOURINARY: No dysuria, frequency or hematuria  NEUROLOGICAL: No numbness or weakness  SKIN: No itching, burning, rashes, or lesions       Vital Signs Last 24 Hrs  T(C): 36.9 (23 Dec 2023 05:16), Max: 36.9 (23 Dec 2023 05:16)  T(F): 98.4 (23 Dec 2023 05:16), Max: 98.4 (23 Dec 2023 05:16)  HR: 66 (23 Dec 2023 05:16) (61 - 88)  BP: 109/63 (23 Dec 2023 05:16) (109/58 - 116/65)  BP(mean): --  RR: 17 (23 Dec 2023 05:16) (17 - 18)  SpO2: 95% (23 Dec 2023 05:16) (95% - 98%)    Parameters below as of 23 Dec 2023 05:16  Patient On (Oxygen Delivery Method): room air        I&O's Summary    23 Dec 2023 07:01  -  23 Dec 2023 09:47  --------------------------------------------------------  IN: 120 mL / OUT: 700 mL / NET: -580 mL      PHYSICAL EXAM  Constitutional: Pt lying in bed, awake and alert, NAD  HEENT: EOMI, normocephalic, moist mucous membranes  Neck: Soft and supple  Respiratory: CTABL, No wheezing, rales or rhonchi  Cardiovascular: S1S2+, RRR, no M/G/R  Gastrointestinal: BS+, soft, NT/ND, no guarding, no rebound  Extremities: No calf pain. Trace LE edema b/l  Vascular: Peripheral pulses present  Neurological: AAOx3, no focal deficits  Musculoskeletal: Normal muscle tone, no atrophy, no rigidity, no contractions  Skin: No significant new skin lesions or rashes    MEDICATIONS:  MEDICATIONS  (STANDING):  aspirin  chewable 81 milliGRAM(s) Oral daily  atorvastatin 80 milliGRAM(s) Oral at bedtime  buMETAnide 2 milliGRAM(s) Oral two times a day  clopidogrel Tablet 75 milliGRAM(s) Oral daily  enoxaparin Injectable 40 milliGRAM(s) SubCutaneous every 24 hours  FLUoxetine 40 milliGRAM(s) Oral <User Schedule>  metoprolol succinate ER 25 milliGRAM(s) Oral every 12 hours  mirtazapine 30 milliGRAM(s) Oral at bedtime  piperacillin/tazobactam IVPB.. 3.375 Gram(s) IV Intermittent every 8 hours  tamsulosin 0.8 milliGRAM(s) Oral at bedtime    MEDICATIONS  (PRN):  acetaminophen     Tablet .. 650 milliGRAM(s) Oral every 6 hours PRN Temp greater or equal to 38C (100.4F), Mild Pain (1 - 3)  aluminum hydroxide/magnesium hydroxide/simethicone Suspension 30 milliLiter(s) Oral every 4 hours PRN Dyspepsia  melatonin 3 milliGRAM(s) Oral at bedtime PRN Insomnia  ondansetron Injectable 4 milliGRAM(s) IV Push every 8 hours PRN Nausea and/or Vomiting  temazepam 15 milliGRAM(s) Oral at bedtime PRN Insomnia      LABS: All Labs Reviewed:                        11.1   8.78  )-----------( 138      ( 23 Dec 2023 06:50 )             33.1     12-23    134<L>  |  98  |  83<H>  ----------------------------<  123<H>  3.9   |  25  |  1.79<H>    Ca    9.2      23 Dec 2023 06:50    TPro  7.0  /  Alb  2.9<L>  /  TBili  1.7<H>  /  DBili  x   /  AST  68<H>  /  ALT  76<H>  /  AlkPhos  278<H>  12-23    PT/INR - ( 22 Dec 2023 16:35 )   PT: 12.7 sec;   INR: 1.12 ratio         PTT - ( 22 Dec 2023 16:35 )  PTT:29.7 sec      Blood Culture:   CAPILLARY BLOOD GLUCOSE      RADIOLOGY/EKG (personally reviewed):  EKG 12/22/23    < from: Xray Chest 1 View-PORTABLE IMMEDIATE (Xray Chest 1 View-PORTABLE IMMEDIATE .) (12.22.23 @ 22:41) >  Findings:    No change in tethering of the right lower pleura. The lungs are clear.   There are no pleural effusions. The cardiac silhouette is enlarged. Bones   are grossly normal.    IMPRESSION: Cardiomegaly.    --- End of Report ---    < end of copied text >   Subjective and Objective:   78M PMHx of CAD, lung adenocarcinoma s/p RUL lobectomy 09/2022, CHFrEF, HTN, HLD, depression presenting with shortness of breath x few days. Was admitted 12/18 - 12/21/23 for RSV treated solumedrol 40 iv and d/c with prednisone taper, now comes with c/o having more difficulty breathing, likely 2/2 RSV, r/o PE.     Overnight Events: None   Interval History: Patient was seen and examined by me this morning at bedside. Patient feels well and would like to go home.     REVIEW OF SYSTEMS:  CONSTITUTIONAL: No weakness, fevers or chills  EYES/ENT: No visual changes;  No vertigo or throat pain   NECK: No pain or stiffness  RESPIRATORY: No cough, wheezing, hemoptysis; No shortness of breath  CARDIOVASCULAR: No chest pain or palpitations  GASTROINTESTINAL: No abdominal or epigastric pain. No nausea, vomiting; No diarrhea or constipation.   GENITOURINARY: No dysuria, frequency or hematuria  NEUROLOGICAL: No numbness or weakness  SKIN: No itching, burning, rashes, or lesions       Vital Signs Last 24 Hrs  T(C): 36.9 (23 Dec 2023 05:16), Max: 36.9 (23 Dec 2023 05:16)  T(F): 98.4 (23 Dec 2023 05:16), Max: 98.4 (23 Dec 2023 05:16)  HR: 66 (23 Dec 2023 05:16) (61 - 88)  BP: 109/63 (23 Dec 2023 05:16) (109/58 - 116/65)  BP(mean): --  RR: 17 (23 Dec 2023 05:16) (17 - 18)  SpO2: 95% (23 Dec 2023 05:16) (95% - 98%)    Parameters below as of 23 Dec 2023 05:16  Patient On (Oxygen Delivery Method): room air        I&O's Summary    23 Dec 2023 07:01  -  23 Dec 2023 09:47  --------------------------------------------------------  IN: 120 mL / OUT: 700 mL / NET: -580 mL      PHYSICAL EXAM  Constitutional: Pt lying in bed, awake and alert, NAD  HEENT: EOMI, normocephalic, moist mucous membranes  Neck: Soft and supple  Respiratory: CTABL, No wheezing, rales or rhonchi  Cardiovascular: S1S2+, RRR, no M/G/R  Gastrointestinal: BS+, soft, NT/ND, no guarding, no rebound  Extremities: No calf pain. Trace LE edema b/l  Vascular: Peripheral pulses present  Neurological: AAOx3, no focal deficits  Musculoskeletal: Normal muscle tone, no atrophy, no rigidity, no contractions  Skin: No significant new skin lesions or rashes    MEDICATIONS:  MEDICATIONS  (STANDING):  aspirin  chewable 81 milliGRAM(s) Oral daily  atorvastatin 80 milliGRAM(s) Oral at bedtime  buMETAnide 2 milliGRAM(s) Oral two times a day  clopidogrel Tablet 75 milliGRAM(s) Oral daily  enoxaparin Injectable 40 milliGRAM(s) SubCutaneous every 24 hours  FLUoxetine 40 milliGRAM(s) Oral <User Schedule>  metoprolol succinate ER 25 milliGRAM(s) Oral every 12 hours  mirtazapine 30 milliGRAM(s) Oral at bedtime  piperacillin/tazobactam IVPB.. 3.375 Gram(s) IV Intermittent every 8 hours  tamsulosin 0.8 milliGRAM(s) Oral at bedtime    MEDICATIONS  (PRN):  acetaminophen     Tablet .. 650 milliGRAM(s) Oral every 6 hours PRN Temp greater or equal to 38C (100.4F), Mild Pain (1 - 3)  aluminum hydroxide/magnesium hydroxide/simethicone Suspension 30 milliLiter(s) Oral every 4 hours PRN Dyspepsia  melatonin 3 milliGRAM(s) Oral at bedtime PRN Insomnia  ondansetron Injectable 4 milliGRAM(s) IV Push every 8 hours PRN Nausea and/or Vomiting  temazepam 15 milliGRAM(s) Oral at bedtime PRN Insomnia      LABS: All Labs Reviewed:                        11.1   8.78  )-----------( 138      ( 23 Dec 2023 06:50 )             33.1     12-23    134<L>  |  98  |  83<H>  ----------------------------<  123<H>  3.9   |  25  |  1.79<H>    Ca    9.2      23 Dec 2023 06:50    TPro  7.0  /  Alb  2.9<L>  /  TBili  1.7<H>  /  DBili  x   /  AST  68<H>  /  ALT  76<H>  /  AlkPhos  278<H>  12-23    PT/INR - ( 22 Dec 2023 16:35 )   PT: 12.7 sec;   INR: 1.12 ratio         PTT - ( 22 Dec 2023 16:35 )  PTT:29.7 sec      Blood Culture:   CAPILLARY BLOOD GLUCOSE      RADIOLOGY/EKG (personally reviewed):    < from: Xray Chest 1 View-PORTABLE IMMEDIATE (Xray Chest 1 View-PORTABLE IMMEDIATE .) (12.22.23 @ 22:41) >  Findings:    No change in tethering of the right lower pleura. The lungs are clear.   There are no pleural effusions. The cardiac silhouette is enlarged. Bones   are grossly normal.    IMPRESSION: Cardiomegaly.    --- End of Report ---    < end of copied text >

## 2023-12-23 NOTE — PROGRESS NOTE ADULT - PROBLEM SELECTOR PLAN 1
Likely 2/2 worsening underlying  HARRY infiltrate and recent RSV infection 12/18/23 ISO lung cancer, no hypoxia  - cxr with worsening HARRY findings   - echo on 12/19/23 with ED 25-30%, mild pulmonary HTN  -BNP   - d dimer 444 (age adjusted with VTE unlikely)  - pulm dr. rodriguez consult for am  - BNP 40533, previously 8000s  - d/c lasix given euvolemic exam, restart home Bume  - c/w zosyn  - restart prednisone   - f/u V/Q scan  - f/u pulm recs  - hold prednisone due to fluid retention Likely 2/2 worsening underlying  HARRY infiltrate and recent RSV infection 12/18/23 ISO lung cancer, no hypoxia  - cxr with worsening HARRY findings   - echo on 12/19/23 with ED 25-30%, mild pulmonary HTN  -BNP   - d dimer 444 (age adjusted with VTE unlikely)  - pulm dr. rodriguez consult for am  - BNP 03673, previously 8000s  - d/c lasix given euvolemic exam, restart home Bume  - c/w zosyn  - restart prednisone   - f/u V/Q scan  - f/u pulm recs  - hold prednisone due to fluid retention Likely 2/2 worsening underlying  HARRY infiltrate and recent RSV infection 12/18/23 ISO lung cancer, no hypoxia  - CXR with No change in tethering of the right lower pleura. The lungs are clear.   There are no pleural effusions. The cardiac silhouette is enlarged. Bones   are grossly normal.  - echo on 12/19/23 with ED 25-30%, mild pulmonary HTN  - BNP   - d dimer 444 (age adjusted with VTE unlikely)  - pulm dr. rodriguez consult for am  - BNP 89889, previously 8000s  - d/c lasix given euvolemic exam, restart home Bume  - c/w zosyn  - restart prednisone   - f/u V/Q scan  - f/u pulm recs  - hold prednisone due to fluid retention Likely 2/2 worsening underlying  HARRY infiltrate and recent RSV infection 12/18/23 ISO lung cancer, no hypoxia  - CXR with No change in tethering of the right lower pleura. The lungs are clear.   There are no pleural effusions. The cardiac silhouette is enlarged. Bones   are grossly normal.  - echo on 12/19/23 with ED 25-30%, mild pulmonary HTN  - BNP   - d dimer 444 (age adjusted with VTE unlikely)  - pulm dr. rodriguez consult for am  - BNP 89737, previously 8000s  - d/c lasix given euvolemic exam, restart home Bume  - c/w zosyn  - restart prednisone   - f/u V/Q scan  - f/u pulm recs  - hold prednisone due to fluid retention Likely 2/2 worsening underlying  HARRY infiltrate and recent RSV infection 12/18/23 ISO lung cancer, no hypoxia  - CXR with No change in tethering of the right lower pleura. The lungs are clear. No pleural effusions. Cardiac silhouette is enlarged.   - echo on 12/19/23 with ED 25-30%, mild pulmonary HTN  - d-dimer 444   - pulm consult for am  - BNP 23175, previously 8000s  - d/c lasix given euvolemic exam, restart home Bumex  - c/w zosyn  - restart prednisone taper- 30 today, 20 tomorrow  - f/u V/Q scan  - f/u pulm recs Likely 2/2 worsening underlying  HARRY infiltrate and recent RSV infection 12/18/23 ISO lung cancer, no hypoxia  - CXR with No change in tethering of the right lower pleura. The lungs are clear. No pleural effusions. Cardiac silhouette is enlarged.   - echo on 12/19/23 with ED 25-30%, mild pulmonary HTN  - d-dimer 444   - pulm consult for am  - BNP 88985, previously 8000s  - d/c lasix given euvolemic exam, restart home Bumex  - c/w zosyn  - restart prednisone taper- 30 today, 20 tomorrow  - f/u V/Q scan  - f/u pulm recs

## 2023-12-23 NOTE — DISCHARGE NOTE NURSING/CASE MANAGEMENT/SOCIAL WORK - NSDCVIVACCINE_GEN_ALL_CORE_FT
COVID-19, mRNA, LNP-S, PF, 100 mcg/ 0.5 mL dose (Moderna); 17-May-2022 14:07; Pankaj Velazco (RN); [employer] [id number] (Exp. Date: 05-Jun-2022); IntraMuscular; Deltoid Left.; 0.25 milliLiter(s);    COVID-19, mRNA, LNP-S, PF, 100 mcg/ 0.5 mL dose (Moderna); 17-May-2022 14:07; Pankaj Velazco (RN); [employer] [id number] (Exp. Date: 05-Jun-2022); IntraMuscular; Deltoid Left.; 0.25 milliLiter(s);

## 2023-12-23 NOTE — PATIENT PROFILE ADULT - NSPROIMPLANTSMEDDEV_GEN_A_NUR
Quality 111:Pneumonia Vaccination Status For Older Adults: Pneumococcal vaccine (PPSV23) was not administered on or after patientâs 60th birthday and before the end of the measurement period, reason not otherwise specified Quality 226: Preventive Care And Screening: Tobacco Use: Screening And Cessation Intervention: Patient screened for tobacco use and is an ex/non-smoker Quality 130: Documentation Of Current Medications In The Medical Record: Current Medications Documented Detail Level: Detailed Quality 431: Preventive Care And Screening: Unhealthy Alcohol Use - Screening: Patient not identified as an unhealthy alcohol user when screened for unhealthy alcohol use using a systematic screening method Quality 110: Preventive Care And Screening: Influenza Immunization: Influenza immunization was not ordered or administered, reason not given Quality 47: Advance Care Plan: Advance Care Planning discussed and documented in the medical record; patient did not wish or was not able to name a surrogate decision maker or provide an advance care plan. None

## 2023-12-23 NOTE — DISCHARGE NOTE PROVIDER - NSDCHHCONTACT_GEN_ALL_CORE_FT
[facility] NURSING DISCHARGE INSTRUCTIONS    Blood Pressure : 134/55  Weight: 71 kg (156 lb 8.4 oz)  Nursing recommendations for Anita Beard at time of discharge are as follows:  Client and Family Member verbalized understanding of all discharge instructions and prescriptions.     Review all your home medications and newly ordered medications with your doctor and/or pharmacist. Follow medication instructions as directed by your doctor and/or pharmacist.    Pain Management:   Discharge Pain Medication Instructions:  Comfort Goal: Comfort with Movement, Sleep Comfortably  Notify your primary care provider if pain is unrelieved with these measures, if the pain is new, or increased in intensity.    Discharge Skin Characteristics: Warm, Dry  Discharge Skin Exam: Clear  Wound 11/04/23 Incision Head Right bacitracin ointment, 4x4, kirlex (Active)   Wound Image   11/18/23 2030   Site Assessment Clean;Dry;Intact 11/20/23 1910   Periwound Assessment Clean;Dry;Intact 11/20/23 1910   Margins Attached edges 11/20/23 1438   Closure Approximated;Open to air 11/20/23 1910   Drainage Amount None 11/19/23 2300   Dressing Status Open to Air 11/20/23 1910   Dressing Changed Observed 11/20/23 1910   Dressing Cleansing/Solutions 1/4 Strength Dakin's Solution;3% Betadine 11/19/23 2300   NEXT Weekly Photo (Inpatient Only) 11/27/23 11/20/23 1910     Skin / Wound Care Instructions: Please contact your primary care physician for any change in skin integrity.     If You Have Surgical Incisions / Wounds:  Monitor surgical site(s) for signs of increased swelling, redness or symptoms of drainage from the site or fever as this could indicate signs and symptoms of infection. If these symptoms are noted, notifiy your primary care provider.      Discharge Safety Instructions: Should Not Be Left Alone In The House     Discharge Safety Concerns: Balance Problems (Dizziness, Light Headedness), Weakness  The  interdisciplinary team has made recommendation that you should have adult supervision in the house due to weakness and unsteady gait  Anti-embolic stockings are not required to increase circulation to the lower extremities.    Discharge Diet: Regluar     Discharge Liquids: Thin  Discharge Bowel Function: Continent  Please contact your primary care physician for any changes in bowel habits.  Discharge Bowel Program:    Discharge Bladder Function: Continent  Discharge Urinary Devices: None      Nursing Discharge Plan:   Influenza Vaccine Indication: Patient Refuses    Case Management Discharge Instructions:   Discharge Location:    Agency Name/Address/Phone:    Home Health:    Outpatient Services:    DME Provider/Phone:    Medical Equipment Ordered:    Prescription Faxed to:        Discharge Medication Instructions:  Below are the medications your physician expects you to take upon discharge:    Understanding Your Risk for Falls  Each year, millions of people have serious injuries from falls. It is important to understand your risk for falling. Talk with your health care provider about your risk and what you can do to lower it. There are actions you can take at home to lower your risk and prevent falls.  If you do have a serious fall, make sure to tell your health care provider. Falling once raises your risk of falling again.  How can falls affect me?  Serious injuries from falls are common. These include:  Broken bones, such as hip fractures.  Head injuries, such as traumatic brain injuries (TBI) or concussion.  A fear of falling can cause you to avoid activities and stay at home. This can make your muscles weaker and actually raise your risk for a fall.  What can increase my risk?  There are a number of risk factors that increase your risk for falling. The more risk factors you have, the higher your risk of falling. Serious injuries from a fall happen most often to people older than age 65. Children and young adults  ages 15-29 are also at higher risk.  Common risk factors include:  Weakness in the lower body.  Lack (deficiency) of vitamin D.  Being generally weak or confused due to long-term (chronic) illness.  Dizziness or balance problems.  Poor vision.  Medicines that cause dizziness or drowsiness. These can include medicines for your blood pressure, heart, anxiety, insomnia, or edema, as well as pain medicines and muscle relaxants.  Other risk factors include:  Drinking alcohol.  Having had a fall in the past.  Having depression.  Having foot pain or wearing improper footwear.  Working at a dangerous job.  Having any of the following in your home:  Tripping hazards, such as floor clutter or loose rugs.  Poor lighting.  Pets.  Having dementia or memory loss.  What actions can I take to lower my risk of falling?         Physical activity  Maintain physical fitness. Do strength and balance exercises. Consider taking a regular class to build strength and balance. Yoga and janae chi are good options.  Vision  Have your eyes checked every year and your vision prescription updated as needed.  Walking aids and footwear  Wear nonskid shoes. Do not wear high heels.  Do not walk around the house in socks or slippers.  Use a cane or walker as told by your health care provider.  Home safety  Attach secure railings on both sides of your stairs.  Install grab bars for your tub, shower, and toilet. Use a bath mat in your tub or shower.  Use good lighting in all rooms. Keep a flashlight near your bed.  Make sure there is a clear path from your bed to the bathroom. Use night-lights.  Do not use throw rugs. Make sure all carpeting is taped or tacked down securely.  Remove all clutter from walkways and stairways, including extension cords.  Repair uneven or broken steps.  Avoid walking on icy or slippery surfaces. Walk on the grass instead of on icy or slick sidewalks. Use ice melt to get rid of ice on walkways.  Use a cordless phone.  Questions  to ask your health care provider  Can you help me check my risk for a fall?  Do any of my medicines make me more likely to fall?  Should I take a vitamin D supplement?  What exercises can I do to improve my strength and balance?  Should I make an appointment to have my vision checked?  Do I need a bone density test to check for weak bones or osteoporosis?  Would it help to use a cane or a walker?  Where to find more information  Centers for Disease Control and Prevention, [place]: www.cdc.gov  Community-Based Fall Prevention Programs: www.cdc.gov  National [place] on Aging: www.codey.nih.gov  Contact a health care provider if:  You fall at home.  You are afraid of falling at home.  You feel weak, drowsy, or dizzy.  Summary  Serious injuries from a fall happen most often to people older than age 65. Children and young adults ages 15-29 are also at higher risk.  Talk with your health care provider about your risks for falling and how to lower those risks.  Taking certain precautions at home can lower your risk for falling.  If you fall, always tell your health care provider.  This information is not intended to replace advice given to you by your health care provider. Make sure you discuss any questions you have with your health care provider.  Document Revised: 07/21/2021 Document Reviewed: 07/21/2021  [employer] Patient Education © 2023 [employer]  Hypertension, Adult  Hypertension is another name for high blood pressure. High blood pressure forces your heart to work harder to pump blood. This can cause problems over time.  There are two numbers in a blood pressure reading. There is a top number (systolic) over a bottom number (diastolic). It is best to have a blood pressure that is below 120/80.  What are the causes?  The cause of this condition is not known. Some other conditions can lead to high blood pressure.  What increases the risk?  Some lifestyle factors can make you more likely to develop high blood  pressure:  Smoking.  Not getting enough exercise or physical activity.  Being overweight.  Having too much fat, sugar, calories, or salt (sodium) in your diet.  Drinking too much alcohol.  Other risk factors include:  Having any of these conditions:  Heart disease.  Diabetes.  High cholesterol.  Kidney disease.  Obstructive sleep apnea.  Having a family history of high blood pressure and high cholesterol.  Age. The risk increases with age.  Stress.  What are the signs or symptoms?  High blood pressure may not cause symptoms. Very high blood pressure (hypertensive crisis) may cause:  Headache.  Fast or uneven heartbeats (palpitations).  Shortness of breath.  Nosebleed.  Vomiting or feeling like you may vomit (nauseous).  Changes in how you see.  Very bad chest pain.  Feeling dizzy.  Seizures.  How is this treated?  This condition is treated by making healthy lifestyle changes, such as:  Eating healthy foods.  Exercising more.  Drinking less alcohol.  Your doctor may prescribe medicine if lifestyle changes do not help enough and if:  Your top number is above 130.  Your bottom number is above 80.  Your personal target blood pressure may vary.  Follow these instructions at home:  Eating and drinking    If told, follow the DASH eating plan. To follow this plan:  Fill one half of your plate at each meal with fruits and vegetables.  Fill one fourth of your plate at each meal with whole grains. Whole grains include whole-wheat pasta, brown rice, and whole-grain bread.  Eat or drink low-fat dairy products, such as skim milk or low-fat yogurt.  Fill one fourth of your plate at each meal with low-fat (lean) proteins. Low-fat proteins include fish, chicken without skin, eggs, beans, and tofu.  Avoid fatty meat, cured and processed meat, or chicken with skin.  Avoid pre-made or processed food.  Limit the amount of salt in your diet to less than 1,500 mg each day.  Do not drink alcohol if:  Your doctor tells you not to  drink.  You are pregnant, may be pregnant, or are planning to become pregnant.  If you drink alcohol:  Limit how much you have to:  0-1 drink a day for women.  0-2 drinks a day for men.  Know how much alcohol is in your drink. In the [place] one drink equals one 12 oz bottle of beer (355 mL), one 5 oz glass of wine (148 mL), or one 1½ oz glass of hard liquor (44 mL).  Lifestyle    Work with your doctor to stay at a healthy weight or to lose weight. Ask your doctor what the best weight is for you.  Get at least 30 minutes of exercise that causes your heart to beat faster (aerobic exercise) most days of the week. This may include walking, swimming, or biking.  Get at least 30 minutes of exercise that strengthens your muscles (resistance exercise) at least 3 days a week. This may include lifting weights or doing Pilates.  Do not smoke or use any products that contain nicotine or tobacco. If you need help quitting, ask your doctor.  Check your blood pressure at home as told by your doctor.  Keep all follow-up visits.  Medicines  Take over-the-counter and prescription medicines only as told by your doctor. Follow directions carefully.  Do not skip doses of blood pressure medicine. The medicine does not work as well if you skip doses. Skipping doses also puts you at risk for problems.  Ask your doctor about side effects or reactions to medicines that you should watch for.  Contact a doctor if:  You think you are having a reaction to the medicine you are taking.  You have headaches that keep coming back.  You feel dizzy.  You have swelling in your ankles.  You have trouble with your vision.  Get help right away if:  You get a very bad headache.  You start to feel mixed up (confused).  You feel weak or numb.  You feel faint.  You have very bad pain in your:  Chest.  Belly (abdomen).  You vomit more than once.  You have trouble breathing.  These symptoms may be an emergency. Get help right away. Call 911.  Do not wait to see  if the symptoms will go away.  Do not drive yourself to the hospital.  Summary  Hypertension is another name for high blood pressure.  High blood pressure forces your heart to work harder to pump blood.  For most people, a normal blood pressure is less than 120/80.  Making healthy choices can help lower blood pressure. If your blood pressure does not get lower with healthy choices, you may need to take medicine.  This information is not intended to replace advice given to you by your health care provider. Make sure you discuss any questions you have with your health care provider.  Document Revised: 10/06/2022 Document Reviewed: 10/06/2022  [employer] Patient Education © 2023 [employer]  Depression / Suicide Risk    As you are discharged from this [facility] facility, it is important to learn how to keep safe from harming yourself.    Recognize the warning signs:  Abrupt changes in personality, positive or negative- including increase in energy   Giving away possessions  Change in eating patterns- significant weight changes-  positive or negative  Change in sleeping patterns- unable to sleep or sleeping all the time   Unwillingness or inability to communicate  Depression  Unusual sadness, discouragement and loneliness  Talk of wanting to die  Neglect of personal appearance   Rebelliousness- reckless behavior  Withdrawal from people/activities they love  Confusion- inability to concentrate     If you or a loved one observes any of these behaviors or has concerns about self-harm, here's what you can do:  Talk about it- your feelings and reasons for harming yourself  Remove any means that you might use to hurt yourself (examples: pills, rope, extension cords, firearm)  Get professional help from the community (Mental Health, Substance Abuse, psychological counseling)  Do not be alone:Call your Safe Contact- someone whom you trust who will be there for you.  Call your local CRISIS HOTLINE 422-8489 or  [phone]  Call your local Children's Mobile Crisis Response Team [place] (857) 245-5665 or www.[facility]  Call the toll free National Suicide Prevention Hotlines   National Suicide Prevention Lifeline 972-557-FGKJ (4594)  [place] Hope Line Network 800-SUICIDE (813-8417)  Speech Therapy Discharge Instructions for Anita Beard    11/20/2023    Diet: Regular (7), Thin (0)  Swallow Strategies: NA  Other Diet Instructions: NA  Supervision: Intermittent supervision and assistance with transportation.  Cognition / Communication:  Executive function is a set of mental skills that help you get things done. These skills are controlled by an area of the brain called the frontal lobe.    Executive function helps you:  Manage time  Pay attention  Switch focus  Plan and organize  Remember details  Avoid saying or doing the wrong thing  Do things based on your experience  Multitask    When executive function isn’t working as it should, your behavior is less controlled. This can affect your ability to:  Work or go to school  Do things independently  Maintain relationships    How to Manage Executive Function Problems  Take a step-by-step approach to work.  Rely on visual organizational aids.  Use tools like time organizers, computers, or watches with alarms.  Make schedules and look at them several times a day.  Ask for written and oral instructions whenever possible.  Plan for transition times and shifts in activities.  Allow and budget extra time to get tasks done.    When you are about to try a task that you haven’t done in a while (or struggled with the last time), think about the steps involved, try to anticipate possible challenges and identify what might give you trouble, come up with a plan to compensate of those challenges, and evaluate after the fact - to determine if your plan worked or if it needs adjustment.   If you experience an outcome that you didn’t expect, think back to what may have contributed  to the problem.   Break complex problems down into smaller parts.   Sometimes a complicated task can be overwhelming, but if you break it down into components, you will be able to find a place where you can cope with the information.     Be patient and persistent.    Develop tools and strategies that will help organize, filter and narrow down information so that you can deal with it effectively.     Anita, you have done so well here at Rehab! Keep up the great work at home and best of luck in your continued recovery! ~ Jennifer Gordon MS, CCC-SLP   As certified below, I, or a nurse practitioner or physician assistant working with me, had a face-to-face encounter that meets the physician face-to-face encounter requirements.

## 2023-12-23 NOTE — PROGRESS NOTE ADULT - ATTENDING COMMENTS
Feels better this morning     Was discharged from hospital with prednisone taper, states he had SOB at home which prompted return to ER    CXR unremarkable. given empiric zosyn, will d/c  V/Q reviewed - low probability of PE    Pulm to eval. Unclear etiology of shortness of breath which has now resolved - may all be due to viral illness. Not in HF exacerbation

## 2023-12-23 NOTE — DISCHARGE NOTE PROVIDER - NSDCFUSCHEDAPPT_GEN_ALL_CORE_FT
[facility]  [facility]  Scheduled Appointment: 12/27/2023    [facility]  CATSCAN [address]  Scheduled Appointment: 12/27/2023    Delroy Boston  [facility]  FAMILYMED [address]  Scheduled Appointment: 12/29/2023    Mayelin Burden  [facility]  [facility]  Scheduled Appointment: 01/02/2024    Que Alejandre  [facility]  RADMED [address]  Scheduled Appointment: 01/03/2024    Jay Shell  [facility]  HEARTFAIL [address]  Scheduled Appointment: 01/04/2024    [facility]  NUCMED  [address]ilo [address]  Scheduled Appointment: 01/08/2024    [facility]  NUCMED  [address]  Scheduled Appointment: 01/08/2024    Rogelio Lockett  [facility]  OTOLARYNG [address] [facility]  Scheduled Appointment: 01/10/2024     [facility]  [facility]  Scheduled Appointment: 12/27/2023    [facility]  CATSCAN [address]  Scheduled Appointment: 12/27/2023    Delroy Boston  [facility]  FAMILYMED [address]  Scheduled Appointment: 12/29/2023    Mayelin Burden  [facility]  [facility]  Scheduled Appointment: 01/02/2024    Que Alejandre  [facility]  RADMED [address]  Scheduled Appointment: 01/03/2024    Jay Shell  [facility]  HEARTFAIL [address]  Scheduled Appointment: 01/04/2024    [facility]  NUCMED  Danilo D  Scheduled Appointment: 01/08/2024    [facility]  NUCMED  [address]  Scheduled Appointment: 01/08/2024    Rogelio Lockett  [facility]  OTOLARYNG [address] [facility]  Scheduled Appointment: 01/10/2024

## 2023-12-23 NOTE — PATIENT PROFILE ADULT - FALL HARM RISK - UNIVERSAL INTERVENTIONS
Bed in lowest position, wheels locked, appropriate side rails in place/Call bell, personal items and telephone in reach/Instruct patient to call for assistance before getting out of bed or chair/Non-slip footwear when patient is out of bed/[place] to call system/Physically safe environment - no spills, clutter or unnecessary equipment/Purposeful Proactive Rounding/Room/bathroom lighting operational, light cord in reach Bed in lowest position, wheels locked, appropriate side rails in place/Call bell, personal items and telephone in reach/Instruct patient to call for assistance before getting out of bed or chair/Non-slip footwear when patient is out of bed/[place] to call system/Physically safe environment - no spills, clutter or unnecessary equipment/Purposeful Proactive Rounding/Room/bathroom lighting operational, light cord in reach

## 2023-12-23 NOTE — CONSULT NOTE ADULT - ASSESSMENT
Physical Examination:  GENERAL:               Alert, Oriented, No acute distress.    HEENT:                   No JVD, Moist MM  PULM:                     Bilateral air entry, No Rales, No Rhonchi, No Wheezing  CVS:                         S1, S2,  + Murmur  ABD:                        Soft, nondistended, nontender, normoactive bowel sounds,   EXT:                         Trace edema, nontender, No Cyanosis or Clubbing   Vascular:                 Warm Extremities, Normal Capillary refill, Normal Distal Pulses  SKIN:                       Warm and well perfused, no rashes noted.   NEURO:                  Alert, oriented, interactive, nonfocal, follows commands  PSYC:                      Calm, + Insight.    Assessment  Acute Respiratory distress due to RSV    as per history patient wheezing and no longer wheezing  left upper lobe Lung cancer s/p radiation, increasing in size on CT  Chronic Systolic CHF    Underlying BPH, Bipolar d/o, Depression, Anxiety, CAD (coronary artery disease)    Plan  Off oxygen   Supportive care for RSV  Tapering steroids dose   Nebs as needed  never smoker doubt copd, albuterol PRN   Script for out patent PET scan given to wife and pending  heme onc f/u as out patient   low salt diet  diurese as tolerated  d/w Dr. Sánchez

## 2023-12-26 ENCOUNTER — TRANSCRIPTION ENCOUNTER (OUTPATIENT)
Age: 78
End: 2023-12-26

## 2023-12-26 RX ORDER — ACETAMINOPHEN 500 MG/1
500 TABLET ORAL
Refills: 0 | Status: COMPLETED | COMMUNITY
Start: 2022-10-13 | End: 2023-12-26

## 2023-12-26 RX ORDER — LATANOPROST/PF 0.005 %
0.01 DROPS OPHTHALMIC (EYE)
Refills: 0 | Status: COMPLETED | COMMUNITY
Start: 2022-09-08 | End: 2023-12-26

## 2023-12-27 ENCOUNTER — APPOINTMENT (OUTPATIENT)
Dept: CT IMAGING | Facility: HOSPITAL | Age: 78
End: 2023-12-27

## 2023-12-27 ENCOUNTER — APPOINTMENT (OUTPATIENT)
Dept: HEMATOLOGY ONCOLOGY | Facility: CLINIC | Age: 78
End: 2023-12-27

## 2023-12-27 ENCOUNTER — LABORATORY RESULT (OUTPATIENT)
Age: 78
End: 2023-12-27

## 2023-12-28 LAB
CULTURE RESULTS: SIGNIFICANT CHANGE UP
SPECIMEN SOURCE: SIGNIFICANT CHANGE UP

## 2023-12-29 ENCOUNTER — APPOINTMENT (OUTPATIENT)
Dept: FAMILY MEDICINE | Facility: CLINIC | Age: 78
End: 2023-12-29
Payer: MEDICARE

## 2023-12-29 VITALS
SYSTOLIC BLOOD PRESSURE: 105 MMHG | HEIGHT: 71 IN | BODY MASS INDEX: 26.18 KG/M2 | DIASTOLIC BLOOD PRESSURE: 60 MMHG | HEART RATE: 76 BPM | RESPIRATION RATE: 20 BRPM | WEIGHT: 187 LBS

## 2023-12-29 DIAGNOSIS — B33.8 OTHER SPECIFIED VIRAL DISEASES: ICD-10-CM

## 2023-12-29 DIAGNOSIS — R06.02 SHORTNESS OF BREATH: ICD-10-CM

## 2023-12-29 PROCEDURE — 99496 TRANSJ CARE MGMT HIGH F2F 7D: CPT

## 2023-12-29 NOTE — REVIEW OF SYSTEMS
[Postnasal Drip] : postnasal drip [Shortness Of Breath] : no shortness of breath [Wheezing] : no wheezing [Cough] : cough [Dyspnea on Exertion] : not dyspnea on exertion [Negative] : Genitourinary

## 2023-12-29 NOTE — HISTORY OF PRESENT ILLNESS
[Post-hospitalization from ___ Hospital] : Post-hospitalization from [unfilled] Hospital [Admitted on: ___] : The patient was admitted on [unfilled] [Discharged on ___] : discharged on [unfilled] [Discharge Summary] : discharge summary [Pertinent Labs] : pertinent labs [Radiology Findings] : radiology findings [Discharge Med List] : discharge medication list [Med Reconciliation] : medication reconciliation has been completed [Patient Contacted By: ____] : and contacted by [unfilled] [FreeTextEntry2] : Presented to the ER with increasing congestion, SOB.  Diagnosed with an acute RSV infection with bronchospasm - responded to IV steroids transitioned to oral for discharge.  Pt now reports feeling much improved; still has some congestion but denies SOB.  Completed the steroid taper.

## 2023-12-29 NOTE — PHYSICAL EXAM
[No Acute Distress] : no acute distress [No Respiratory Distress] : no respiratory distress  [No Accessory Muscle Use] : no accessory muscle use [Normal] : normal rate, regular rhythm, normal S1 and S2 and no murmur heard [No Edema] : there was no peripheral edema [Soft] : abdomen soft [Non Tender] : non-tender [Normal Posterior Cervical Nodes] : no posterior cervical lymphadenopathy [Normal Anterior Cervical Nodes] : no anterior cervical lymphadenopathy [No Focal Deficits] : no focal deficits [Alert and Oriented x3] : oriented to person, place, and time [de-identified] : few scattered rhonchi; no wheezes; no rales

## 2024-01-01 ENCOUNTER — INPATIENT (INPATIENT)
Facility: HOSPITAL | Age: 79
LOS: 16 days | DRG: 293 | End: 2024-09-24
Attending: INTERNAL MEDICINE | Admitting: INTERNAL MEDICINE
Payer: MEDICARE

## 2024-01-01 VITALS
SYSTOLIC BLOOD PRESSURE: 67 MMHG | DIASTOLIC BLOOD PRESSURE: 32 MMHG | OXYGEN SATURATION: 91 % | HEART RATE: 70 BPM | RESPIRATION RATE: 18 BRPM | TEMPERATURE: 96 F

## 2024-01-01 VITALS
OXYGEN SATURATION: 96 % | HEIGHT: 72 IN | SYSTOLIC BLOOD PRESSURE: 81 MMHG | HEART RATE: 67 BPM | WEIGHT: 179.9 LBS | DIASTOLIC BLOOD PRESSURE: 52 MMHG | RESPIRATION RATE: 17 BRPM

## 2024-01-01 DIAGNOSIS — R06.00 DYSPNEA, UNSPECIFIED: ICD-10-CM

## 2024-01-01 DIAGNOSIS — R45.1 RESTLESSNESS AND AGITATION: ICD-10-CM

## 2024-01-01 DIAGNOSIS — Z71.89 OTHER SPECIFIED COUNSELING: ICD-10-CM

## 2024-01-01 DIAGNOSIS — Z98.890 OTHER SPECIFIED POSTPROCEDURAL STATES: Chronic | ICD-10-CM

## 2024-01-01 DIAGNOSIS — F41.9 ANXIETY DISORDER, UNSPECIFIED: ICD-10-CM

## 2024-01-01 DIAGNOSIS — R52 PAIN, UNSPECIFIED: ICD-10-CM

## 2024-01-01 DIAGNOSIS — I50.84 END STAGE HEART FAILURE: ICD-10-CM

## 2024-01-01 DIAGNOSIS — I50.9 HEART FAILURE, UNSPECIFIED: ICD-10-CM

## 2024-01-01 DIAGNOSIS — R53.2 FUNCTIONAL QUADRIPLEGIA: ICD-10-CM

## 2024-01-01 DIAGNOSIS — Z51.5 ENCOUNTER FOR PALLIATIVE CARE: ICD-10-CM

## 2024-01-01 LAB
A1C WITH ESTIMATED AVERAGE GLUCOSE RESULT: 5.8 % — HIGH (ref 4–5.6)
ALBUMIN SERPL ELPH-MCNC: 2.6 G/DL — LOW (ref 3.3–5)
ALBUMIN SERPL ELPH-MCNC: 2.6 G/DL — LOW (ref 3.3–5)
ALBUMIN SERPL ELPH-MCNC: 2.7 G/DL — LOW (ref 3.3–5)
ALBUMIN SERPL ELPH-MCNC: 2.8 G/DL — LOW (ref 3.3–5)
ALBUMIN SERPL ELPH-MCNC: 2.9 G/DL — LOW (ref 3.3–5)
ALBUMIN SERPL ELPH-MCNC: 3 G/DL — LOW (ref 3.3–5)
ALBUMIN SERPL ELPH-MCNC: 3 G/DL — LOW (ref 3.3–5)
ALBUMIN SERPL ELPH-MCNC: 3.2 G/DL — LOW (ref 3.3–5)
ALP SERPL-CCNC: 211 U/L — HIGH (ref 40–120)
ALP SERPL-CCNC: 211 U/L — HIGH (ref 40–120)
ALP SERPL-CCNC: 215 U/L — HIGH (ref 40–120)
ALP SERPL-CCNC: 220 U/L — HIGH (ref 40–120)
ALP SERPL-CCNC: 223 U/L — HIGH (ref 40–120)
ALP SERPL-CCNC: 224 U/L — HIGH (ref 40–120)
ALP SERPL-CCNC: 229 U/L — HIGH (ref 40–120)
ALP SERPL-CCNC: 230 U/L — HIGH (ref 40–120)
ALP SERPL-CCNC: 234 U/L — HIGH (ref 40–120)
ALP SERPL-CCNC: 235 U/L — HIGH (ref 40–120)
ALP SERPL-CCNC: 236 U/L — HIGH (ref 40–120)
ALP SERPL-CCNC: 240 U/L — HIGH (ref 40–120)
ALP SERPL-CCNC: 241 U/L — HIGH (ref 40–120)
ALP SERPL-CCNC: 245 U/L — HIGH (ref 40–120)
ALP SERPL-CCNC: 249 U/L — HIGH (ref 40–120)
ALP SERPL-CCNC: 249 U/L — HIGH (ref 40–120)
ALT FLD-CCNC: 23 U/L — SIGNIFICANT CHANGE UP (ref 10–45)
ALT FLD-CCNC: 25 U/L — SIGNIFICANT CHANGE UP (ref 10–45)
ALT FLD-CCNC: 25 U/L — SIGNIFICANT CHANGE UP (ref 10–45)
ALT FLD-CCNC: 26 U/L — SIGNIFICANT CHANGE UP (ref 10–45)
ALT FLD-CCNC: 27 U/L — SIGNIFICANT CHANGE UP (ref 10–45)
ALT FLD-CCNC: 28 U/L — SIGNIFICANT CHANGE UP (ref 10–45)
ALT FLD-CCNC: 29 U/L — SIGNIFICANT CHANGE UP (ref 10–45)
ALT FLD-CCNC: 29 U/L — SIGNIFICANT CHANGE UP (ref 10–45)
ANION GAP SERPL CALC-SCNC: 15 MMOL/L — SIGNIFICANT CHANGE UP (ref 5–17)
ANION GAP SERPL CALC-SCNC: 16 MMOL/L — SIGNIFICANT CHANGE UP (ref 5–17)
ANION GAP SERPL CALC-SCNC: 17 MMOL/L — SIGNIFICANT CHANGE UP (ref 5–17)
ANION GAP SERPL CALC-SCNC: 18 MMOL/L — HIGH (ref 5–17)
ANION GAP SERPL CALC-SCNC: 19 MMOL/L — HIGH (ref 5–17)
ANION GAP SERPL CALC-SCNC: 19 MMOL/L — HIGH (ref 5–17)
ANION GAP SERPL CALC-SCNC: 20 MMOL/L — HIGH (ref 5–17)
ANION GAP SERPL CALC-SCNC: 21 MMOL/L — HIGH (ref 5–17)
ANION GAP SERPL CALC-SCNC: 21 MMOL/L — HIGH (ref 5–17)
ANISOCYTOSIS BLD QL: SIGNIFICANT CHANGE UP
ANISOCYTOSIS BLD QL: SLIGHT — SIGNIFICANT CHANGE UP
APPEARANCE UR: ABNORMAL
APTT BLD: 26.6 SEC — SIGNIFICANT CHANGE UP (ref 24.5–35.6)
APTT BLD: 28.6 SEC — SIGNIFICANT CHANGE UP (ref 24.5–35.6)
APTT BLD: 30.2 SEC — SIGNIFICANT CHANGE UP (ref 24.5–35.6)
APTT BLD: 31 SEC — SIGNIFICANT CHANGE UP (ref 24.5–35.6)
APTT BLD: 31.1 SEC — SIGNIFICANT CHANGE UP (ref 24.5–35.6)
APTT BLD: 32.8 SEC — SIGNIFICANT CHANGE UP (ref 24.5–35.6)
AST SERPL-CCNC: 52 U/L — HIGH (ref 10–40)
AST SERPL-CCNC: 55 U/L — HIGH (ref 10–40)
AST SERPL-CCNC: 56 U/L — HIGH (ref 10–40)
AST SERPL-CCNC: 57 U/L — HIGH (ref 10–40)
AST SERPL-CCNC: 58 U/L — HIGH (ref 10–40)
AST SERPL-CCNC: 59 U/L — HIGH (ref 10–40)
AST SERPL-CCNC: 61 U/L — HIGH (ref 10–40)
AST SERPL-CCNC: 61 U/L — HIGH (ref 10–40)
AST SERPL-CCNC: 63 U/L — HIGH (ref 10–40)
AST SERPL-CCNC: 67 U/L — HIGH (ref 10–40)
BACTERIA # UR AUTO: NEGATIVE /HPF — SIGNIFICANT CHANGE UP
BASOPHILS # BLD AUTO: 0 K/UL — SIGNIFICANT CHANGE UP (ref 0–0.2)
BASOPHILS # BLD AUTO: 0.01 K/UL — SIGNIFICANT CHANGE UP (ref 0–0.2)
BASOPHILS NFR BLD AUTO: 0 % — SIGNIFICANT CHANGE UP (ref 0–2)
BASOPHILS NFR BLD AUTO: 0.2 % — SIGNIFICANT CHANGE UP (ref 0–2)
BILIRUB SERPL-MCNC: 1.8 MG/DL — HIGH (ref 0.2–1.2)
BILIRUB SERPL-MCNC: 1.9 MG/DL — HIGH (ref 0.2–1.2)
BILIRUB SERPL-MCNC: 2 MG/DL — HIGH (ref 0.2–1.2)
BILIRUB SERPL-MCNC: 2.1 MG/DL — HIGH (ref 0.2–1.2)
BILIRUB SERPL-MCNC: 2.2 MG/DL — HIGH (ref 0.2–1.2)
BILIRUB SERPL-MCNC: 2.3 MG/DL — HIGH (ref 0.2–1.2)
BILIRUB SERPL-MCNC: 2.3 MG/DL — HIGH (ref 0.2–1.2)
BILIRUB SERPL-MCNC: 2.4 MG/DL — HIGH (ref 0.2–1.2)
BILIRUB UR-MCNC: NEGATIVE — SIGNIFICANT CHANGE UP
BLD GP AB SCN SERPL QL: NEGATIVE — SIGNIFICANT CHANGE UP
BUN SERPL-MCNC: 100 MG/DL — HIGH (ref 7–23)
BUN SERPL-MCNC: 104 MG/DL — HIGH (ref 7–23)
BUN SERPL-MCNC: 104 MG/DL — HIGH (ref 7–23)
BUN SERPL-MCNC: 107 MG/DL — HIGH (ref 7–23)
BUN SERPL-MCNC: 107 MG/DL — HIGH (ref 7–23)
BUN SERPL-MCNC: 79 MG/DL — HIGH (ref 7–23)
BUN SERPL-MCNC: 80 MG/DL — HIGH (ref 7–23)
BUN SERPL-MCNC: 87 MG/DL — HIGH (ref 7–23)
BUN SERPL-MCNC: 88 MG/DL — HIGH (ref 7–23)
BUN SERPL-MCNC: 88 MG/DL — HIGH (ref 7–23)
BUN SERPL-MCNC: 92 MG/DL — HIGH (ref 7–23)
BUN SERPL-MCNC: 94 MG/DL — HIGH (ref 7–23)
BUN SERPL-MCNC: 95 MG/DL — HIGH (ref 7–23)
BUN SERPL-MCNC: 96 MG/DL — HIGH (ref 7–23)
BUN SERPL-MCNC: 96 MG/DL — HIGH (ref 7–23)
BUN SERPL-MCNC: 97 MG/DL — HIGH (ref 7–23)
BUN SERPL-MCNC: 98 MG/DL — HIGH (ref 7–23)
CALCIUM SERPL-MCNC: 7.8 MG/DL — LOW (ref 8.4–10.5)
CALCIUM SERPL-MCNC: 8.1 MG/DL — LOW (ref 8.4–10.5)
CALCIUM SERPL-MCNC: 8.2 MG/DL — LOW (ref 8.4–10.5)
CALCIUM SERPL-MCNC: 8.3 MG/DL — LOW (ref 8.4–10.5)
CALCIUM SERPL-MCNC: 8.3 MG/DL — LOW (ref 8.4–10.5)
CALCIUM SERPL-MCNC: 8.4 MG/DL — SIGNIFICANT CHANGE UP (ref 8.4–10.5)
CALCIUM SERPL-MCNC: 8.5 MG/DL — SIGNIFICANT CHANGE UP (ref 8.4–10.5)
CALCIUM SERPL-MCNC: 8.6 MG/DL — SIGNIFICANT CHANGE UP (ref 8.4–10.5)
CALCIUM SERPL-MCNC: 8.6 MG/DL — SIGNIFICANT CHANGE UP (ref 8.4–10.5)
CALCIUM SERPL-MCNC: 8.8 MG/DL — SIGNIFICANT CHANGE UP (ref 8.4–10.5)
CALCIUM SERPL-MCNC: 8.8 MG/DL — SIGNIFICANT CHANGE UP (ref 8.4–10.5)
CALCIUM SERPL-MCNC: 8.9 MG/DL — SIGNIFICANT CHANGE UP (ref 8.4–10.5)
CALCIUM SERPL-MCNC: 9 MG/DL — SIGNIFICANT CHANGE UP (ref 8.4–10.5)
CAST: 10 /LPF — HIGH (ref 0–4)
CHLORIDE SERPL-SCNC: 86 MMOL/L — LOW (ref 96–108)
CHLORIDE SERPL-SCNC: 87 MMOL/L — LOW (ref 96–108)
CHLORIDE SERPL-SCNC: 87 MMOL/L — LOW (ref 96–108)
CHLORIDE SERPL-SCNC: 88 MMOL/L — LOW (ref 96–108)
CHLORIDE SERPL-SCNC: 89 MMOL/L — LOW (ref 96–108)
CHLORIDE SERPL-SCNC: 90 MMOL/L — LOW (ref 96–108)
CHLORIDE SERPL-SCNC: 90 MMOL/L — LOW (ref 96–108)
CHLORIDE SERPL-SCNC: 91 MMOL/L — LOW (ref 96–108)
CHLORIDE SERPL-SCNC: 92 MMOL/L — LOW (ref 96–108)
CHLORIDE SERPL-SCNC: 94 MMOL/L — LOW (ref 96–108)
CHOLEST SERPL-MCNC: 99 MG/DL — SIGNIFICANT CHANGE UP
CO2 SERPL-SCNC: 14 MMOL/L — LOW (ref 22–31)
CO2 SERPL-SCNC: 14 MMOL/L — LOW (ref 22–31)
CO2 SERPL-SCNC: 15 MMOL/L — LOW (ref 22–31)
CO2 SERPL-SCNC: 15 MMOL/L — LOW (ref 22–31)
CO2 SERPL-SCNC: 17 MMOL/L — LOW (ref 22–31)
CO2 SERPL-SCNC: 20 MMOL/L — LOW (ref 22–31)
CO2 SERPL-SCNC: 21 MMOL/L — LOW (ref 22–31)
CO2 SERPL-SCNC: 21 MMOL/L — LOW (ref 22–31)
CO2 SERPL-SCNC: 22 MMOL/L — SIGNIFICANT CHANGE UP (ref 22–31)
CO2 SERPL-SCNC: 23 MMOL/L — SIGNIFICANT CHANGE UP (ref 22–31)
CO2 SERPL-SCNC: 24 MMOL/L — SIGNIFICANT CHANGE UP (ref 22–31)
COD CRY URNS QL: PRESENT
COLOR SPEC: YELLOW — SIGNIFICANT CHANGE UP
CREAT SERPL-MCNC: 3.35 MG/DL — HIGH (ref 0.5–1.3)
CREAT SERPL-MCNC: 3.42 MG/DL — HIGH (ref 0.5–1.3)
CREAT SERPL-MCNC: 3.62 MG/DL — HIGH (ref 0.5–1.3)
CREAT SERPL-MCNC: 3.81 MG/DL — HIGH (ref 0.5–1.3)
CREAT SERPL-MCNC: 3.83 MG/DL — HIGH (ref 0.5–1.3)
CREAT SERPL-MCNC: 3.84 MG/DL — HIGH (ref 0.5–1.3)
CREAT SERPL-MCNC: 3.92 MG/DL — HIGH (ref 0.5–1.3)
CREAT SERPL-MCNC: 3.92 MG/DL — HIGH (ref 0.5–1.3)
CREAT SERPL-MCNC: 3.98 MG/DL — HIGH (ref 0.5–1.3)
CREAT SERPL-MCNC: 4.01 MG/DL — HIGH (ref 0.5–1.3)
CREAT SERPL-MCNC: 4.01 MG/DL — HIGH (ref 0.5–1.3)
CREAT SERPL-MCNC: 4.09 MG/DL — HIGH (ref 0.5–1.3)
CREAT SERPL-MCNC: 4.15 MG/DL — HIGH (ref 0.5–1.3)
CREAT SERPL-MCNC: 4.2 MG/DL — HIGH (ref 0.5–1.3)
CREAT SERPL-MCNC: 4.27 MG/DL — HIGH (ref 0.5–1.3)
CREAT SERPL-MCNC: 4.59 MG/DL — HIGH (ref 0.5–1.3)
CREAT SERPL-MCNC: 4.68 MG/DL — HIGH (ref 0.5–1.3)
CREAT SERPL-MCNC: 4.68 MG/DL — HIGH (ref 0.5–1.3)
CREAT SERPL-MCNC: 4.74 MG/DL — HIGH (ref 0.5–1.3)
CULTURE RESULTS: SIGNIFICANT CHANGE UP
CULTURE RESULTS: SIGNIFICANT CHANGE UP
DIFF PNL FLD: ABNORMAL
EGFR: 12 ML/MIN/1.73M2 — LOW
EGFR: 13 ML/MIN/1.73M2 — LOW
EGFR: 14 ML/MIN/1.73M2 — LOW
EGFR: 15 ML/MIN/1.73M2 — LOW
EGFR: 16 ML/MIN/1.73M2 — LOW
EGFR: 18 ML/MIN/1.73M2 — LOW
EGFR: 18 ML/MIN/1.73M2 — LOW
ELLIPTOCYTES BLD QL SMEAR: SLIGHT — SIGNIFICANT CHANGE UP
EOSINOPHIL # BLD AUTO: 0 K/UL — SIGNIFICANT CHANGE UP (ref 0–0.5)
EOSINOPHIL # BLD AUTO: 0 K/UL — SIGNIFICANT CHANGE UP (ref 0–0.5)
EOSINOPHIL # BLD AUTO: 0.01 K/UL — SIGNIFICANT CHANGE UP (ref 0–0.5)
EOSINOPHIL # BLD AUTO: 0.01 K/UL — SIGNIFICANT CHANGE UP (ref 0–0.5)
EOSINOPHIL # BLD AUTO: 0.02 K/UL — SIGNIFICANT CHANGE UP (ref 0–0.5)
EOSINOPHIL # BLD AUTO: 0.02 K/UL — SIGNIFICANT CHANGE UP (ref 0–0.5)
EOSINOPHIL # BLD AUTO: 0.05 K/UL — SIGNIFICANT CHANGE UP (ref 0–0.5)
EOSINOPHIL # BLD AUTO: 0.07 K/UL — SIGNIFICANT CHANGE UP (ref 0–0.5)
EOSINOPHIL # BLD AUTO: 0.11 K/UL — SIGNIFICANT CHANGE UP (ref 0–0.5)
EOSINOPHIL # BLD AUTO: 0.12 K/UL — SIGNIFICANT CHANGE UP (ref 0–0.5)
EOSINOPHIL # BLD AUTO: 0.15 K/UL — SIGNIFICANT CHANGE UP (ref 0–0.5)
EOSINOPHIL # BLD AUTO: 0.16 K/UL — SIGNIFICANT CHANGE UP (ref 0–0.5)
EOSINOPHIL # BLD AUTO: 0.22 K/UL — SIGNIFICANT CHANGE UP (ref 0–0.5)
EOSINOPHIL NFR BLD AUTO: 0 % — SIGNIFICANT CHANGE UP (ref 0–6)
EOSINOPHIL NFR BLD AUTO: 0 % — SIGNIFICANT CHANGE UP (ref 0–6)
EOSINOPHIL NFR BLD AUTO: 0.2 % — SIGNIFICANT CHANGE UP (ref 0–6)
EOSINOPHIL NFR BLD AUTO: 0.2 % — SIGNIFICANT CHANGE UP (ref 0–6)
EOSINOPHIL NFR BLD AUTO: 0.3 % — SIGNIFICANT CHANGE UP (ref 0–6)
EOSINOPHIL NFR BLD AUTO: 0.5 % — SIGNIFICANT CHANGE UP (ref 0–6)
EOSINOPHIL NFR BLD AUTO: 1 % — SIGNIFICANT CHANGE UP (ref 0–6)
EOSINOPHIL NFR BLD AUTO: 1.9 % — SIGNIFICANT CHANGE UP (ref 0–6)
EOSINOPHIL NFR BLD AUTO: 2 % — SIGNIFICANT CHANGE UP (ref 0–6)
EOSINOPHIL NFR BLD AUTO: 2.9 % — SIGNIFICANT CHANGE UP (ref 0–6)
EOSINOPHIL NFR BLD AUTO: 3 % — SIGNIFICANT CHANGE UP (ref 0–6)
EOSINOPHIL NFR BLD AUTO: 3.5 % — SIGNIFICANT CHANGE UP (ref 0–6)
EOSINOPHIL NFR BLD AUTO: 4.5 % — SIGNIFICANT CHANGE UP (ref 0–6)
ESTIMATED AVERAGE GLUCOSE: 120 MG/DL — HIGH (ref 68–114)
FERRITIN SERPL-MCNC: 190 NG/ML — SIGNIFICANT CHANGE UP (ref 30–400)
GAS PNL BLDV: SIGNIFICANT CHANGE UP
GLUCOSE SERPL-MCNC: 100 MG/DL — HIGH (ref 70–99)
GLUCOSE SERPL-MCNC: 100 MG/DL — HIGH (ref 70–99)
GLUCOSE SERPL-MCNC: 103 MG/DL — HIGH (ref 70–99)
GLUCOSE SERPL-MCNC: 104 MG/DL — HIGH (ref 70–99)
GLUCOSE SERPL-MCNC: 104 MG/DL — HIGH (ref 70–99)
GLUCOSE SERPL-MCNC: 107 MG/DL — HIGH (ref 70–99)
GLUCOSE SERPL-MCNC: 122 MG/DL — HIGH (ref 70–99)
GLUCOSE SERPL-MCNC: 125 MG/DL — HIGH (ref 70–99)
GLUCOSE SERPL-MCNC: 133 MG/DL — HIGH (ref 70–99)
GLUCOSE SERPL-MCNC: 135 MG/DL — HIGH (ref 70–99)
GLUCOSE SERPL-MCNC: 138 MG/DL — HIGH (ref 70–99)
GLUCOSE SERPL-MCNC: 139 MG/DL — HIGH (ref 70–99)
GLUCOSE SERPL-MCNC: 161 MG/DL — HIGH (ref 70–99)
GLUCOSE SERPL-MCNC: 163 MG/DL — HIGH (ref 70–99)
GLUCOSE SERPL-MCNC: 68 MG/DL — LOW (ref 70–99)
GLUCOSE SERPL-MCNC: 70 MG/DL — SIGNIFICANT CHANGE UP (ref 70–99)
GLUCOSE SERPL-MCNC: 76 MG/DL — SIGNIFICANT CHANGE UP (ref 70–99)
GLUCOSE SERPL-MCNC: 82 MG/DL — SIGNIFICANT CHANGE UP (ref 70–99)
GLUCOSE SERPL-MCNC: 99 MG/DL — SIGNIFICANT CHANGE UP (ref 70–99)
GLUCOSE UR QL: NEGATIVE MG/DL — SIGNIFICANT CHANGE UP
HCT VFR BLD CALC: 22.7 % — LOW (ref 39–50)
HCT VFR BLD CALC: 23 % — LOW (ref 39–50)
HCT VFR BLD CALC: 23.4 % — LOW (ref 39–50)
HCT VFR BLD CALC: 24.4 % — LOW (ref 39–50)
HCT VFR BLD CALC: 24.5 % — LOW (ref 39–50)
HCT VFR BLD CALC: 24.5 % — LOW (ref 39–50)
HCT VFR BLD CALC: 24.9 % — LOW (ref 39–50)
HCT VFR BLD CALC: 24.9 % — LOW (ref 39–50)
HCT VFR BLD CALC: 25.1 % — LOW (ref 39–50)
HCT VFR BLD CALC: 26.6 % — LOW (ref 39–50)
HCT VFR BLD CALC: 26.7 % — LOW (ref 39–50)
HCT VFR BLD CALC: 27.6 % — LOW (ref 39–50)
HDLC SERPL-MCNC: 58 MG/DL — SIGNIFICANT CHANGE UP
HGB BLD-MCNC: 7.4 G/DL — LOW (ref 13–17)
HGB BLD-MCNC: 7.5 G/DL — LOW (ref 13–17)
HGB BLD-MCNC: 7.7 G/DL — LOW (ref 13–17)
HGB BLD-MCNC: 7.7 G/DL — LOW (ref 13–17)
HGB BLD-MCNC: 7.9 G/DL — LOW (ref 13–17)
HGB BLD-MCNC: 8 G/DL — LOW (ref 13–17)
HGB BLD-MCNC: 8 G/DL — LOW (ref 13–17)
HGB BLD-MCNC: 8.2 G/DL — LOW (ref 13–17)
HGB BLD-MCNC: 8.2 G/DL — LOW (ref 13–17)
HGB BLD-MCNC: 8.4 G/DL — LOW (ref 13–17)
HGB BLD-MCNC: 8.5 G/DL — LOW (ref 13–17)
HGB BLD-MCNC: 8.9 G/DL — LOW (ref 13–17)
HGB BLD-MCNC: 9.2 G/DL — LOW (ref 13–17)
IMM GRANULOCYTES NFR BLD AUTO: 0.3 % — SIGNIFICANT CHANGE UP (ref 0–0.9)
IMM GRANULOCYTES NFR BLD AUTO: 0.4 % — SIGNIFICANT CHANGE UP (ref 0–0.9)
IMM GRANULOCYTES NFR BLD AUTO: 0.4 % — SIGNIFICANT CHANGE UP (ref 0–0.9)
IMM GRANULOCYTES NFR BLD AUTO: 0.5 % — SIGNIFICANT CHANGE UP (ref 0–0.9)
IMM GRANULOCYTES NFR BLD AUTO: 0.6 % — SIGNIFICANT CHANGE UP (ref 0–0.9)
IMM GRANULOCYTES NFR BLD AUTO: 0.7 % — SIGNIFICANT CHANGE UP (ref 0–0.9)
IMM GRANULOCYTES NFR BLD AUTO: 0.8 % — SIGNIFICANT CHANGE UP (ref 0–0.9)
IMM GRANULOCYTES NFR BLD AUTO: 1.1 % — HIGH (ref 0–0.9)
INR BLD: 1.13 RATIO — SIGNIFICANT CHANGE UP (ref 0.85–1.18)
INR BLD: 1.14 RATIO — SIGNIFICANT CHANGE UP (ref 0.85–1.18)
INR BLD: 1.33 RATIO — HIGH (ref 0.85–1.18)
INR BLD: 1.33 RATIO — HIGH (ref 0.85–1.18)
IRON SATN MFR SERPL: 18 % — SIGNIFICANT CHANGE UP (ref 16–55)
IRON SATN MFR SERPL: 61 UG/DL — SIGNIFICANT CHANGE UP (ref 45–165)
KETONES UR-MCNC: NEGATIVE MG/DL — SIGNIFICANT CHANGE UP
LACTATE SERPL-SCNC: 1.7 MMOL/L — SIGNIFICANT CHANGE UP (ref 0.5–2)
LACTATE SERPL-SCNC: 2 MMOL/L — SIGNIFICANT CHANGE UP (ref 0.5–2)
LEUKOCYTE ESTERASE UR-ACNC: ABNORMAL
LIPID PNL WITH DIRECT LDL SERPL: 29 MG/DL — SIGNIFICANT CHANGE UP
LYMPHOCYTES # BLD AUTO: 0.14 K/UL — LOW (ref 1–3.3)
LYMPHOCYTES # BLD AUTO: 0.18 K/UL — LOW (ref 1–3.3)
LYMPHOCYTES # BLD AUTO: 0.18 K/UL — LOW (ref 1–3.3)
LYMPHOCYTES # BLD AUTO: 0.21 K/UL — LOW (ref 1–3.3)
LYMPHOCYTES # BLD AUTO: 0.22 K/UL — LOW (ref 1–3.3)
LYMPHOCYTES # BLD AUTO: 0.26 K/UL — LOW (ref 1–3.3)
LYMPHOCYTES # BLD AUTO: 0.38 K/UL — LOW (ref 1–3.3)
LYMPHOCYTES # BLD AUTO: 0.43 K/UL — LOW (ref 1–3.3)
LYMPHOCYTES # BLD AUTO: 0.44 K/UL — LOW (ref 1–3.3)
LYMPHOCYTES # BLD AUTO: 0.47 K/UL — LOW (ref 1–3.3)
LYMPHOCYTES # BLD AUTO: 0.52 K/UL — LOW (ref 1–3.3)
LYMPHOCYTES # BLD AUTO: 0.54 K/UL — LOW (ref 1–3.3)
LYMPHOCYTES # BLD AUTO: 0.55 K/UL — LOW (ref 1–3.3)
LYMPHOCYTES # BLD AUTO: 10.9 % — LOW (ref 13–44)
LYMPHOCYTES # BLD AUTO: 11.2 % — LOW (ref 13–44)
LYMPHOCYTES # BLD AUTO: 12.9 % — LOW (ref 13–44)
LYMPHOCYTES # BLD AUTO: 12.9 % — LOW (ref 13–44)
LYMPHOCYTES # BLD AUTO: 2.3 % — LOW (ref 13–44)
LYMPHOCYTES # BLD AUTO: 3.2 % — LOW (ref 13–44)
LYMPHOCYTES # BLD AUTO: 3.5 % — LOW (ref 13–44)
LYMPHOCYTES # BLD AUTO: 3.5 % — LOW (ref 13–44)
LYMPHOCYTES # BLD AUTO: 3.7 % — LOW (ref 13–44)
LYMPHOCYTES # BLD AUTO: 5.4 % — LOW (ref 13–44)
LYMPHOCYTES # BLD AUTO: 7.6 % — LOW (ref 13–44)
LYMPHOCYTES # BLD AUTO: 8.8 % — LOW (ref 13–44)
LYMPHOCYTES # BLD AUTO: 9.8 % — LOW (ref 13–44)
MACROCYTES BLD QL: SLIGHT — SIGNIFICANT CHANGE UP
MACROCYTES BLD QL: SLIGHT — SIGNIFICANT CHANGE UP
MAGNESIUM SERPL-MCNC: 2 MG/DL — SIGNIFICANT CHANGE UP (ref 1.6–2.6)
MAGNESIUM SERPL-MCNC: 2.1 MG/DL — SIGNIFICANT CHANGE UP (ref 1.6–2.6)
MAGNESIUM SERPL-MCNC: 2.2 MG/DL — SIGNIFICANT CHANGE UP (ref 1.6–2.6)
MAGNESIUM SERPL-MCNC: 2.3 MG/DL — SIGNIFICANT CHANGE UP (ref 1.6–2.6)
MAGNESIUM SERPL-MCNC: 2.4 MG/DL — SIGNIFICANT CHANGE UP (ref 1.6–2.6)
MAGNESIUM SERPL-MCNC: 2.5 MG/DL — SIGNIFICANT CHANGE UP (ref 1.6–2.6)
MAGNESIUM SERPL-MCNC: 2.5 MG/DL — SIGNIFICANT CHANGE UP (ref 1.6–2.6)
MAGNESIUM SERPL-MCNC: 2.6 MG/DL — SIGNIFICANT CHANGE UP (ref 1.6–2.6)
MAGNESIUM SERPL-MCNC: 2.7 MG/DL — HIGH (ref 1.6–2.6)
MAGNESIUM SERPL-MCNC: 2.7 MG/DL — HIGH (ref 1.6–2.6)
MANUAL SMEAR VERIFICATION: SIGNIFICANT CHANGE UP
MANUAL SMEAR VERIFICATION: SIGNIFICANT CHANGE UP
MCHC RBC-ENTMCNC: 28.7 PG — SIGNIFICANT CHANGE UP (ref 27–34)
MCHC RBC-ENTMCNC: 28.8 PG — SIGNIFICANT CHANGE UP (ref 27–34)
MCHC RBC-ENTMCNC: 28.9 PG — SIGNIFICANT CHANGE UP (ref 27–34)
MCHC RBC-ENTMCNC: 29.1 PG — SIGNIFICANT CHANGE UP (ref 27–34)
MCHC RBC-ENTMCNC: 29.2 PG — SIGNIFICANT CHANGE UP (ref 27–34)
MCHC RBC-ENTMCNC: 29.4 PG — SIGNIFICANT CHANGE UP (ref 27–34)
MCHC RBC-ENTMCNC: 29.4 PG — SIGNIFICANT CHANGE UP (ref 27–34)
MCHC RBC-ENTMCNC: 29.5 PG — SIGNIFICANT CHANGE UP (ref 27–34)
MCHC RBC-ENTMCNC: 29.5 PG — SIGNIFICANT CHANGE UP (ref 27–34)
MCHC RBC-ENTMCNC: 29.6 PG — SIGNIFICANT CHANGE UP (ref 27–34)
MCHC RBC-ENTMCNC: 29.8 PG — SIGNIFICANT CHANGE UP (ref 27–34)
MCHC RBC-ENTMCNC: 29.9 PG — SIGNIFICANT CHANGE UP (ref 27–34)
MCHC RBC-ENTMCNC: 30.1 PG — SIGNIFICANT CHANGE UP (ref 27–34)
MCHC RBC-ENTMCNC: 30.1 PG — SIGNIFICANT CHANGE UP (ref 27–34)
MCHC RBC-ENTMCNC: 30.5 PG — SIGNIFICANT CHANGE UP (ref 27–34)
MCHC RBC-ENTMCNC: 31.6 GM/DL — LOW (ref 32–36)
MCHC RBC-ENTMCNC: 32 GM/DL — SIGNIFICANT CHANGE UP (ref 32–36)
MCHC RBC-ENTMCNC: 32.1 GM/DL — SIGNIFICANT CHANGE UP (ref 32–36)
MCHC RBC-ENTMCNC: 32.2 GM/DL — SIGNIFICANT CHANGE UP (ref 32–36)
MCHC RBC-ENTMCNC: 32.6 GM/DL — SIGNIFICANT CHANGE UP (ref 32–36)
MCHC RBC-ENTMCNC: 32.7 GM/DL — SIGNIFICANT CHANGE UP (ref 32–36)
MCHC RBC-ENTMCNC: 32.7 GM/DL — SIGNIFICANT CHANGE UP (ref 32–36)
MCHC RBC-ENTMCNC: 32.8 GM/DL — SIGNIFICANT CHANGE UP (ref 32–36)
MCHC RBC-ENTMCNC: 32.9 GM/DL — SIGNIFICANT CHANGE UP (ref 32–36)
MCHC RBC-ENTMCNC: 33 GM/DL — SIGNIFICANT CHANGE UP (ref 32–36)
MCHC RBC-ENTMCNC: 33.3 GM/DL — SIGNIFICANT CHANGE UP (ref 32–36)
MCHC RBC-ENTMCNC: 33.3 GM/DL — SIGNIFICANT CHANGE UP (ref 32–36)
MCHC RBC-ENTMCNC: 33.7 GM/DL — SIGNIFICANT CHANGE UP (ref 32–36)
MCV RBC AUTO: 88 FL — SIGNIFICANT CHANGE UP (ref 80–100)
MCV RBC AUTO: 88.3 FL — SIGNIFICANT CHANGE UP (ref 80–100)
MCV RBC AUTO: 89.1 FL — SIGNIFICANT CHANGE UP (ref 80–100)
MCV RBC AUTO: 89.3 FL — SIGNIFICANT CHANGE UP (ref 80–100)
MCV RBC AUTO: 89.7 FL — SIGNIFICANT CHANGE UP (ref 80–100)
MCV RBC AUTO: 90 FL — SIGNIFICANT CHANGE UP (ref 80–100)
MCV RBC AUTO: 90 FL — SIGNIFICANT CHANGE UP (ref 80–100)
MCV RBC AUTO: 90.1 FL — SIGNIFICANT CHANGE UP (ref 80–100)
MCV RBC AUTO: 90.2 FL — SIGNIFICANT CHANGE UP (ref 80–100)
MCV RBC AUTO: 90.5 FL — SIGNIFICANT CHANGE UP (ref 80–100)
MCV RBC AUTO: 90.7 FL — SIGNIFICANT CHANGE UP (ref 80–100)
MCV RBC AUTO: 90.9 FL — SIGNIFICANT CHANGE UP (ref 80–100)
MCV RBC AUTO: 91.4 FL — SIGNIFICANT CHANGE UP (ref 80–100)
MCV RBC AUTO: 91.7 FL — SIGNIFICANT CHANGE UP (ref 80–100)
MCV RBC AUTO: 92.7 FL — SIGNIFICANT CHANGE UP (ref 80–100)
MONOCYTES # BLD AUTO: 0.58 K/UL — SIGNIFICANT CHANGE UP (ref 0–0.9)
MONOCYTES # BLD AUTO: 0.59 K/UL — SIGNIFICANT CHANGE UP (ref 0–0.9)
MONOCYTES # BLD AUTO: 0.61 K/UL — SIGNIFICANT CHANGE UP (ref 0–0.9)
MONOCYTES # BLD AUTO: 0.68 K/UL — SIGNIFICANT CHANGE UP (ref 0–0.9)
MONOCYTES # BLD AUTO: 0.68 K/UL — SIGNIFICANT CHANGE UP (ref 0–0.9)
MONOCYTES # BLD AUTO: 0.72 K/UL — SIGNIFICANT CHANGE UP (ref 0–0.9)
MONOCYTES # BLD AUTO: 0.72 K/UL — SIGNIFICANT CHANGE UP (ref 0–0.9)
MONOCYTES # BLD AUTO: 0.76 K/UL — SIGNIFICANT CHANGE UP (ref 0–0.9)
MONOCYTES # BLD AUTO: 0.77 K/UL — SIGNIFICANT CHANGE UP (ref 0–0.9)
MONOCYTES # BLD AUTO: 0.79 K/UL — SIGNIFICANT CHANGE UP (ref 0–0.9)
MONOCYTES # BLD AUTO: 0.8 K/UL — SIGNIFICANT CHANGE UP (ref 0–0.9)
MONOCYTES # BLD AUTO: 0.83 K/UL — SIGNIFICANT CHANGE UP (ref 0–0.9)
MONOCYTES # BLD AUTO: 0.85 K/UL — SIGNIFICANT CHANGE UP (ref 0–0.9)
MONOCYTES NFR BLD AUTO: 12.2 % — SIGNIFICANT CHANGE UP (ref 2–14)
MONOCYTES NFR BLD AUTO: 12.8 % — SIGNIFICANT CHANGE UP (ref 2–14)
MONOCYTES NFR BLD AUTO: 13 % — SIGNIFICANT CHANGE UP (ref 2–14)
MONOCYTES NFR BLD AUTO: 13.1 % — SIGNIFICANT CHANGE UP (ref 2–14)
MONOCYTES NFR BLD AUTO: 13.4 % — SIGNIFICANT CHANGE UP (ref 2–14)
MONOCYTES NFR BLD AUTO: 14.4 % — HIGH (ref 2–14)
MONOCYTES NFR BLD AUTO: 14.6 % — HIGH (ref 2–14)
MONOCYTES NFR BLD AUTO: 14.7 % — HIGH (ref 2–14)
MONOCYTES NFR BLD AUTO: 15 % — HIGH (ref 2–14)
MONOCYTES NFR BLD AUTO: 15.2 % — HIGH (ref 2–14)
MONOCYTES NFR BLD AUTO: 15.9 % — HIGH (ref 2–14)
MONOCYTES NFR BLD AUTO: 16.5 % — HIGH (ref 2–14)
MONOCYTES NFR BLD AUTO: 17.1 % — HIGH (ref 2–14)
MRSA PCR RESULT.: SIGNIFICANT CHANGE UP
NEUTROPHILS # BLD AUTO: 2.5 K/UL — SIGNIFICANT CHANGE UP (ref 1.8–7.4)
NEUTROPHILS # BLD AUTO: 2.86 K/UL — SIGNIFICANT CHANGE UP (ref 1.8–7.4)
NEUTROPHILS # BLD AUTO: 2.87 K/UL — SIGNIFICANT CHANGE UP (ref 1.8–7.4)
NEUTROPHILS # BLD AUTO: 3.23 K/UL — SIGNIFICANT CHANGE UP (ref 1.8–7.4)
NEUTROPHILS # BLD AUTO: 3.55 K/UL — SIGNIFICANT CHANGE UP (ref 1.8–7.4)
NEUTROPHILS # BLD AUTO: 3.65 K/UL — SIGNIFICANT CHANGE UP (ref 1.8–7.4)
NEUTROPHILS # BLD AUTO: 3.78 K/UL — SIGNIFICANT CHANGE UP (ref 1.8–7.4)
NEUTROPHILS # BLD AUTO: 4.11 K/UL — SIGNIFICANT CHANGE UP (ref 1.8–7.4)
NEUTROPHILS # BLD AUTO: 4.16 K/UL — SIGNIFICANT CHANGE UP (ref 1.8–7.4)
NEUTROPHILS # BLD AUTO: 4.66 K/UL — SIGNIFICANT CHANGE UP (ref 1.8–7.4)
NEUTROPHILS # BLD AUTO: 4.85 K/UL — SIGNIFICANT CHANGE UP (ref 1.8–7.4)
NEUTROPHILS # BLD AUTO: 4.89 K/UL — SIGNIFICANT CHANGE UP (ref 1.8–7.4)
NEUTROPHILS # BLD AUTO: 5.12 K/UL — SIGNIFICANT CHANGE UP (ref 1.8–7.4)
NEUTROPHILS NFR BLD AUTO: 68.7 % — SIGNIFICANT CHANGE UP (ref 43–77)
NEUTROPHILS NFR BLD AUTO: 68.8 % — SIGNIFICANT CHANGE UP (ref 43–77)
NEUTROPHILS NFR BLD AUTO: 69.8 % — SIGNIFICANT CHANGE UP (ref 43–77)
NEUTROPHILS NFR BLD AUTO: 72.8 % — SIGNIFICANT CHANGE UP (ref 43–77)
NEUTROPHILS NFR BLD AUTO: 73 % — SIGNIFICANT CHANGE UP (ref 43–77)
NEUTROPHILS NFR BLD AUTO: 73.2 % — SIGNIFICANT CHANGE UP (ref 43–77)
NEUTROPHILS NFR BLD AUTO: 74.1 % — SIGNIFICANT CHANGE UP (ref 43–77)
NEUTROPHILS NFR BLD AUTO: 75.8 % — SIGNIFICANT CHANGE UP (ref 43–77)
NEUTROPHILS NFR BLD AUTO: 80 % — HIGH (ref 43–77)
NEUTROPHILS NFR BLD AUTO: 82.2 % — HIGH (ref 43–77)
NEUTROPHILS NFR BLD AUTO: 82.7 % — HIGH (ref 43–77)
NEUTROPHILS NFR BLD AUTO: 83.9 % — HIGH (ref 43–77)
NEUTROPHILS NFR BLD AUTO: 84.1 % — HIGH (ref 43–77)
NITRITE UR-MCNC: NEGATIVE — SIGNIFICANT CHANGE UP
NON HDL CHOLESTEROL: 41 MG/DL — SIGNIFICANT CHANGE UP
NRBC # BLD: 0 /100 WBCS — SIGNIFICANT CHANGE UP (ref 0–0)
NRBC # BLD: 1 /100 WBCS — HIGH (ref 0–0)
NRBC # BLD: 5 /100 WBCS — HIGH (ref 0–0)
NT-PROBNP SERPL-SCNC: 6500 PG/ML — HIGH (ref 0–300)
OVALOCYTES BLD QL SMEAR: SLIGHT — SIGNIFICANT CHANGE UP
PH UR: 6 — SIGNIFICANT CHANGE UP (ref 5–8)
PHOSPHATE SERPL-MCNC: 4.4 MG/DL — SIGNIFICANT CHANGE UP (ref 2.5–4.5)
PHOSPHATE SERPL-MCNC: 4.5 MG/DL — SIGNIFICANT CHANGE UP (ref 2.5–4.5)
PHOSPHATE SERPL-MCNC: 4.7 MG/DL — HIGH (ref 2.5–4.5)
PHOSPHATE SERPL-MCNC: 4.7 MG/DL — HIGH (ref 2.5–4.5)
PHOSPHATE SERPL-MCNC: 4.8 MG/DL — HIGH (ref 2.5–4.5)
PHOSPHATE SERPL-MCNC: 4.9 MG/DL — HIGH (ref 2.5–4.5)
PHOSPHATE SERPL-MCNC: 4.9 MG/DL — HIGH (ref 2.5–4.5)
PHOSPHATE SERPL-MCNC: 5 MG/DL — HIGH (ref 2.5–4.5)
PHOSPHATE SERPL-MCNC: 5.8 MG/DL — HIGH (ref 2.5–4.5)
PHOSPHATE SERPL-MCNC: 7.3 MG/DL — HIGH (ref 2.5–4.5)
PHOSPHATE SERPL-MCNC: 7.4 MG/DL — HIGH (ref 2.5–4.5)
PHOSPHATE SERPL-MCNC: 7.4 MG/DL — HIGH (ref 2.5–4.5)
PHOSPHATE SERPL-MCNC: 7.6 MG/DL — HIGH (ref 2.5–4.5)
PLAT MORPH BLD: NORMAL — SIGNIFICANT CHANGE UP
PLAT MORPH BLD: NORMAL — SIGNIFICANT CHANGE UP
PLATELET # BLD AUTO: 58 K/UL — LOW (ref 150–400)
PLATELET # BLD AUTO: 60 K/UL — LOW (ref 150–400)
PLATELET # BLD AUTO: 62 K/UL — LOW (ref 150–400)
PLATELET # BLD AUTO: 63 K/UL — LOW (ref 150–400)
PLATELET # BLD AUTO: 63 K/UL — LOW (ref 150–400)
PLATELET # BLD AUTO: 64 K/UL — LOW (ref 150–400)
PLATELET # BLD AUTO: 65 K/UL — LOW (ref 150–400)
PLATELET # BLD AUTO: 67 K/UL — LOW (ref 150–400)
PLATELET # BLD AUTO: 68 K/UL — LOW (ref 150–400)
PLATELET # BLD AUTO: 68 K/UL — LOW (ref 150–400)
PLATELET # BLD AUTO: 69 K/UL — LOW (ref 150–400)
PLATELET # BLD AUTO: 71 K/UL — LOW (ref 150–400)
PLATELET # BLD AUTO: 81 K/UL — LOW (ref 150–400)
PLATELET # BLD AUTO: 82 K/UL — LOW (ref 150–400)
PLATELET # BLD AUTO: 83 K/UL — LOW (ref 150–400)
POIKILOCYTOSIS BLD QL AUTO: SLIGHT — SIGNIFICANT CHANGE UP
POIKILOCYTOSIS BLD QL AUTO: SLIGHT — SIGNIFICANT CHANGE UP
POLYCHROMASIA BLD QL SMEAR: SLIGHT — SIGNIFICANT CHANGE UP
POTASSIUM SERPL-MCNC: 3.1 MMOL/L — LOW (ref 3.5–5.3)
POTASSIUM SERPL-MCNC: 3.4 MMOL/L — LOW (ref 3.5–5.3)
POTASSIUM SERPL-MCNC: 3.4 MMOL/L — LOW (ref 3.5–5.3)
POTASSIUM SERPL-MCNC: 3.5 MMOL/L — SIGNIFICANT CHANGE UP (ref 3.5–5.3)
POTASSIUM SERPL-MCNC: 3.5 MMOL/L — SIGNIFICANT CHANGE UP (ref 3.5–5.3)
POTASSIUM SERPL-MCNC: 3.6 MMOL/L — SIGNIFICANT CHANGE UP (ref 3.5–5.3)
POTASSIUM SERPL-MCNC: 3.7 MMOL/L — SIGNIFICANT CHANGE UP (ref 3.5–5.3)
POTASSIUM SERPL-MCNC: 3.8 MMOL/L — SIGNIFICANT CHANGE UP (ref 3.5–5.3)
POTASSIUM SERPL-MCNC: 3.9 MMOL/L — SIGNIFICANT CHANGE UP (ref 3.5–5.3)
POTASSIUM SERPL-MCNC: 5 MMOL/L — SIGNIFICANT CHANGE UP (ref 3.5–5.3)
POTASSIUM SERPL-MCNC: 5.3 MMOL/L — SIGNIFICANT CHANGE UP (ref 3.5–5.3)
POTASSIUM SERPL-MCNC: 5.3 MMOL/L — SIGNIFICANT CHANGE UP (ref 3.5–5.3)
POTASSIUM SERPL-MCNC: 5.6 MMOL/L — HIGH (ref 3.5–5.3)
POTASSIUM SERPL-SCNC: 3.1 MMOL/L — LOW (ref 3.5–5.3)
POTASSIUM SERPL-SCNC: 3.4 MMOL/L — LOW (ref 3.5–5.3)
POTASSIUM SERPL-SCNC: 3.4 MMOL/L — LOW (ref 3.5–5.3)
POTASSIUM SERPL-SCNC: 3.5 MMOL/L — SIGNIFICANT CHANGE UP (ref 3.5–5.3)
POTASSIUM SERPL-SCNC: 3.5 MMOL/L — SIGNIFICANT CHANGE UP (ref 3.5–5.3)
POTASSIUM SERPL-SCNC: 3.6 MMOL/L — SIGNIFICANT CHANGE UP (ref 3.5–5.3)
POTASSIUM SERPL-SCNC: 3.7 MMOL/L — SIGNIFICANT CHANGE UP (ref 3.5–5.3)
POTASSIUM SERPL-SCNC: 3.8 MMOL/L — SIGNIFICANT CHANGE UP (ref 3.5–5.3)
POTASSIUM SERPL-SCNC: 3.9 MMOL/L — SIGNIFICANT CHANGE UP (ref 3.5–5.3)
POTASSIUM SERPL-SCNC: 5 MMOL/L — SIGNIFICANT CHANGE UP (ref 3.5–5.3)
POTASSIUM SERPL-SCNC: 5.3 MMOL/L — SIGNIFICANT CHANGE UP (ref 3.5–5.3)
POTASSIUM SERPL-SCNC: 5.3 MMOL/L — SIGNIFICANT CHANGE UP (ref 3.5–5.3)
POTASSIUM SERPL-SCNC: 5.6 MMOL/L — HIGH (ref 3.5–5.3)
PROT SERPL-MCNC: 5.8 G/DL — LOW (ref 6–8.3)
PROT SERPL-MCNC: 5.9 G/DL — LOW (ref 6–8.3)
PROT SERPL-MCNC: 5.9 G/DL — LOW (ref 6–8.3)
PROT SERPL-MCNC: 6 G/DL — SIGNIFICANT CHANGE UP (ref 6–8.3)
PROT SERPL-MCNC: 6.2 G/DL — SIGNIFICANT CHANGE UP (ref 6–8.3)
PROT SERPL-MCNC: 6.3 G/DL — SIGNIFICANT CHANGE UP (ref 6–8.3)
PROT SERPL-MCNC: 6.4 G/DL — SIGNIFICANT CHANGE UP (ref 6–8.3)
PROT SERPL-MCNC: 6.5 G/DL — SIGNIFICANT CHANGE UP (ref 6–8.3)
PROT SERPL-MCNC: 6.6 G/DL — SIGNIFICANT CHANGE UP (ref 6–8.3)
PROT SERPL-MCNC: 6.9 G/DL — SIGNIFICANT CHANGE UP (ref 6–8.3)
PROT SERPL-MCNC: 7 G/DL — SIGNIFICANT CHANGE UP (ref 6–8.3)
PROT UR-MCNC: 100 MG/DL
PROTHROM AB SERPL-ACNC: 12.4 SEC — SIGNIFICANT CHANGE UP (ref 9.5–13)
PROTHROM AB SERPL-ACNC: 12.5 SEC — SIGNIFICANT CHANGE UP (ref 9.5–13)
PROTHROM AB SERPL-ACNC: 13.8 SEC — HIGH (ref 9.5–13)
PROTHROM AB SERPL-ACNC: 14.5 SEC — HIGH (ref 9.5–13)
RBC # BLD: 2.57 M/UL — LOW (ref 4.2–5.8)
RBC # BLD: 2.58 M/UL — LOW (ref 4.2–5.8)
RBC # BLD: 2.58 M/UL — LOW (ref 4.2–5.8)
RBC # BLD: 2.6 M/UL — LOW (ref 4.2–5.8)
RBC # BLD: 2.61 M/UL — LOW (ref 4.2–5.8)
RBC # BLD: 2.66 M/UL — LOW (ref 4.2–5.8)
RBC # BLD: 2.66 M/UL — LOW (ref 4.2–5.8)
RBC # BLD: 2.68 M/UL — LOW (ref 4.2–5.8)
RBC # BLD: 2.72 M/UL — LOW (ref 4.2–5.8)
RBC # BLD: 2.74 M/UL — LOW (ref 4.2–5.8)
RBC # BLD: 2.75 M/UL — LOW (ref 4.2–5.8)
RBC # BLD: 2.79 M/UL — LOW (ref 4.2–5.8)
RBC # BLD: 2.87 M/UL — LOW (ref 4.2–5.8)
RBC # BLD: 2.99 M/UL — LOW (ref 4.2–5.8)
RBC # BLD: 3.06 M/UL — LOW (ref 4.2–5.8)
RBC # FLD: 19.4 % — HIGH (ref 10.3–14.5)
RBC # FLD: 19.4 % — HIGH (ref 10.3–14.5)
RBC # FLD: 19.5 % — HIGH (ref 10.3–14.5)
RBC # FLD: 19.5 % — HIGH (ref 10.3–14.5)
RBC # FLD: 19.6 % — HIGH (ref 10.3–14.5)
RBC # FLD: 19.7 % — HIGH (ref 10.3–14.5)
RBC # FLD: 19.8 % — HIGH (ref 10.3–14.5)
RBC # FLD: 19.9 % — HIGH (ref 10.3–14.5)
RBC # FLD: 19.9 % — HIGH (ref 10.3–14.5)
RBC # FLD: 20 % — HIGH (ref 10.3–14.5)
RBC # FLD: 20.3 % — HIGH (ref 10.3–14.5)
RBC # FLD: 20.3 % — HIGH (ref 10.3–14.5)
RBC # FLD: 20.5 % — HIGH (ref 10.3–14.5)
RBC BLD AUTO: ABNORMAL
RBC BLD AUTO: ABNORMAL
RBC CASTS # UR COMP ASSIST: 21 /HPF — HIGH (ref 0–4)
REVIEW: SIGNIFICANT CHANGE UP
RH IG SCN BLD-IMP: POSITIVE — SIGNIFICANT CHANGE UP
S AUREUS DNA NOSE QL NAA+PROBE: DETECTED
SODIUM SERPL-SCNC: 122 MMOL/L — LOW (ref 135–145)
SODIUM SERPL-SCNC: 123 MMOL/L — LOW (ref 135–145)
SODIUM SERPL-SCNC: 126 MMOL/L — LOW (ref 135–145)
SODIUM SERPL-SCNC: 127 MMOL/L — LOW (ref 135–145)
SODIUM SERPL-SCNC: 129 MMOL/L — LOW (ref 135–145)
SODIUM SERPL-SCNC: 129 MMOL/L — LOW (ref 135–145)
SODIUM SERPL-SCNC: 130 MMOL/L — LOW (ref 135–145)
SODIUM SERPL-SCNC: 131 MMOL/L — LOW (ref 135–145)
SODIUM SERPL-SCNC: 132 MMOL/L — LOW (ref 135–145)
SODIUM SERPL-SCNC: 132 MMOL/L — LOW (ref 135–145)
SODIUM SERPL-SCNC: 134 MMOL/L — LOW (ref 135–145)
SP GR SPEC: 1.01 — SIGNIFICANT CHANGE UP (ref 1–1.03)
SPECIMEN SOURCE: SIGNIFICANT CHANGE UP
SPECIMEN SOURCE: SIGNIFICANT CHANGE UP
SQUAMOUS # UR AUTO: 6 /HPF — HIGH (ref 0–5)
TIBC SERPL-MCNC: 342 UG/DL — SIGNIFICANT CHANGE UP (ref 220–430)
TRANSFERRIN SERPL-MCNC: 262 MG/DL — SIGNIFICANT CHANGE UP (ref 200–360)
TRIGL SERPL-MCNC: 47 MG/DL — SIGNIFICANT CHANGE UP
TROPONIN T, HIGH SENSITIVITY RESULT: 152 NG/L — HIGH (ref 0–51)
TSH SERPL-MCNC: 14.5 UIU/ML — HIGH (ref 0.27–4.2)
UIBC SERPL-MCNC: 280 UG/DL — SIGNIFICANT CHANGE UP (ref 110–370)
URATE SERPL-MCNC: 9.3 MG/DL — HIGH (ref 3.4–8.8)
UROBILINOGEN FLD QL: 1 MG/DL — SIGNIFICANT CHANGE UP (ref 0.2–1)
WBC # BLD: 3.64 K/UL — LOW (ref 3.8–10.5)
WBC # BLD: 3.94 K/UL — SIGNIFICANT CHANGE UP (ref 3.8–10.5)
WBC # BLD: 4.17 K/UL — SIGNIFICANT CHANGE UP (ref 3.8–10.5)
WBC # BLD: 4.63 K/UL — SIGNIFICANT CHANGE UP (ref 3.8–10.5)
WBC # BLD: 4.74 K/UL — SIGNIFICANT CHANGE UP (ref 3.8–10.5)
WBC # BLD: 4.86 K/UL — SIGNIFICANT CHANGE UP (ref 3.8–10.5)
WBC # BLD: 4.99 K/UL — SIGNIFICANT CHANGE UP (ref 3.8–10.5)
WBC # BLD: 4.99 K/UL — SIGNIFICANT CHANGE UP (ref 3.8–10.5)
WBC # BLD: 5.14 K/UL — SIGNIFICANT CHANGE UP (ref 3.8–10.5)
WBC # BLD: 5.54 K/UL — SIGNIFICANT CHANGE UP (ref 3.8–10.5)
WBC # BLD: 5.56 K/UL — SIGNIFICANT CHANGE UP (ref 3.8–10.5)
WBC # BLD: 5.61 K/UL — SIGNIFICANT CHANGE UP (ref 3.8–10.5)
WBC # BLD: 5.9 K/UL — SIGNIFICANT CHANGE UP (ref 3.8–10.5)
WBC # BLD: 5.92 K/UL — SIGNIFICANT CHANGE UP (ref 3.8–10.5)
WBC # BLD: 6.09 K/UL — SIGNIFICANT CHANGE UP (ref 3.8–10.5)
WBC # FLD AUTO: 3.64 K/UL — LOW (ref 3.8–10.5)
WBC # FLD AUTO: 3.94 K/UL — SIGNIFICANT CHANGE UP (ref 3.8–10.5)
WBC # FLD AUTO: 4.17 K/UL — SIGNIFICANT CHANGE UP (ref 3.8–10.5)
WBC # FLD AUTO: 4.63 K/UL — SIGNIFICANT CHANGE UP (ref 3.8–10.5)
WBC # FLD AUTO: 4.74 K/UL — SIGNIFICANT CHANGE UP (ref 3.8–10.5)
WBC # FLD AUTO: 4.86 K/UL — SIGNIFICANT CHANGE UP (ref 3.8–10.5)
WBC # FLD AUTO: 4.99 K/UL — SIGNIFICANT CHANGE UP (ref 3.8–10.5)
WBC # FLD AUTO: 4.99 K/UL — SIGNIFICANT CHANGE UP (ref 3.8–10.5)
WBC # FLD AUTO: 5.14 K/UL — SIGNIFICANT CHANGE UP (ref 3.8–10.5)
WBC # FLD AUTO: 5.54 K/UL — SIGNIFICANT CHANGE UP (ref 3.8–10.5)
WBC # FLD AUTO: 5.56 K/UL — SIGNIFICANT CHANGE UP (ref 3.8–10.5)
WBC # FLD AUTO: 5.61 K/UL — SIGNIFICANT CHANGE UP (ref 3.8–10.5)
WBC # FLD AUTO: 5.9 K/UL — SIGNIFICANT CHANGE UP (ref 3.8–10.5)
WBC # FLD AUTO: 5.92 K/UL — SIGNIFICANT CHANGE UP (ref 3.8–10.5)
WBC # FLD AUTO: 6.09 K/UL — SIGNIFICANT CHANGE UP (ref 3.8–10.5)
WBC UR QL: 6 /HPF — HIGH (ref 0–5)

## 2024-01-01 PROCEDURE — 99291 CRITICAL CARE FIRST HOUR: CPT

## 2024-01-01 PROCEDURE — 36556 INSERT NON-TUNNEL CV CATH: CPT

## 2024-01-01 PROCEDURE — 99292 CRITICAL CARE ADDL 30 MIN: CPT | Mod: FS

## 2024-01-01 PROCEDURE — 93010 ELECTROCARDIOGRAM REPORT: CPT

## 2024-01-01 PROCEDURE — 99291 CRITICAL CARE FIRST HOUR: CPT | Mod: 25

## 2024-01-01 PROCEDURE — 99285 EMERGENCY DEPT VISIT HI MDM: CPT

## 2024-01-01 PROCEDURE — 36800 INSERTION OF CANNULA: CPT

## 2024-01-01 PROCEDURE — 99292 CRITICAL CARE ADDL 30 MIN: CPT | Mod: FS,25

## 2024-01-01 PROCEDURE — 99223 1ST HOSP IP/OBS HIGH 75: CPT

## 2024-01-01 PROCEDURE — 99497 ADVNCD CARE PLAN 30 MIN: CPT | Mod: 25

## 2024-01-01 PROCEDURE — 71045 X-RAY EXAM CHEST 1 VIEW: CPT | Mod: 26,77

## 2024-01-01 PROCEDURE — 99233 SBSQ HOSP IP/OBS HIGH 50: CPT | Mod: GC

## 2024-01-01 PROCEDURE — 99233 SBSQ HOSP IP/OBS HIGH 50: CPT

## 2024-01-01 PROCEDURE — 99291 CRITICAL CARE FIRST HOUR: CPT | Mod: FS

## 2024-01-01 PROCEDURE — 99498 ADVNCD CARE PLAN ADDL 30 MIN: CPT | Mod: 25

## 2024-01-01 PROCEDURE — 99291 CRITICAL CARE FIRST HOUR: CPT | Mod: FS,25

## 2024-01-01 PROCEDURE — 76775 US EXAM ABDO BACK WALL LIM: CPT | Mod: 26

## 2024-01-01 PROCEDURE — 99292 CRITICAL CARE ADDL 30 MIN: CPT

## 2024-01-01 PROCEDURE — 71045 X-RAY EXAM CHEST 1 VIEW: CPT | Mod: 26

## 2024-01-01 PROCEDURE — 93306 TTE W/DOPPLER COMPLETE: CPT | Mod: 26

## 2024-01-01 PROCEDURE — 99221 1ST HOSP IP/OBS SF/LOW 40: CPT

## 2024-01-01 PROCEDURE — 76937 US GUIDE VASCULAR ACCESS: CPT | Mod: 26

## 2024-01-01 RX ORDER — CEFEPIME 2 G/1
1000 INJECTION, POWDER, FOR SOLUTION INTRAVENOUS ONCE
Refills: 0 | Status: COMPLETED | OUTPATIENT
Start: 2024-01-01 | End: 2024-01-01

## 2024-01-01 RX ORDER — ONDANSETRON HCL/PF 4 MG/2 ML
4 VIAL (ML) INJECTION ONCE
Refills: 0 | Status: COMPLETED | OUTPATIENT
Start: 2024-01-01 | End: 2024-01-01

## 2024-01-01 RX ORDER — CEFEPIME 2 G/1
1000 INJECTION, POWDER, FOR SOLUTION INTRAVENOUS EVERY 12 HOURS
Refills: 0 | Status: DISCONTINUED | OUTPATIENT
Start: 2024-01-01 | End: 2024-01-01

## 2024-01-01 RX ORDER — INSULIN REGULAR, HUMAN 100/ML
5 VIAL (ML) INJECTION ONCE
Refills: 0 | Status: COMPLETED | OUTPATIENT
Start: 2024-01-01 | End: 2024-01-01

## 2024-01-01 RX ORDER — BUMETANIDE 2 MG/1
2 TABLET ORAL ONCE
Refills: 0 | Status: COMPLETED | OUTPATIENT
Start: 2024-01-01 | End: 2024-01-01

## 2024-01-01 RX ORDER — LIDOCAINE 50 MG/G
1 CREAM TOPICAL
Refills: 0 | Status: DISCONTINUED | OUTPATIENT
Start: 2024-01-01 | End: 2024-01-01

## 2024-01-01 RX ORDER — CEFEPIME 2 G/1
INJECTION, POWDER, FOR SOLUTION INTRAVENOUS
Refills: 0 | Status: DISCONTINUED | OUTPATIENT
Start: 2024-01-01 | End: 2024-01-01

## 2024-01-01 RX ORDER — CHLOROTHIAZIDE 500 MG
500 TABLET ORAL ONCE
Refills: 0 | Status: COMPLETED | OUTPATIENT
Start: 2024-01-01 | End: 2024-01-01

## 2024-01-01 RX ORDER — HYDROMORPHONE HYDROCHLORIDE 1 MG/ML
0.2 INJECTION, SOLUTION INTRAMUSCULAR; INTRAVENOUS; SUBCUTANEOUS
Refills: 0 | Status: DISCONTINUED | OUTPATIENT
Start: 2024-01-01 | End: 2024-01-01

## 2024-01-01 RX ORDER — BUMETANIDE 2 MG/1
2 TABLET ORAL
Refills: 0 | Status: DISCONTINUED | OUTPATIENT
Start: 2024-01-01 | End: 2024-01-01

## 2024-01-01 RX ORDER — HYDROMORPHONE HYDROCHLORIDE 1 MG/ML
0.5 INJECTION, SOLUTION INTRAMUSCULAR; INTRAVENOUS; SUBCUTANEOUS
Refills: 0 | Status: DISCONTINUED | OUTPATIENT
Start: 2024-01-01 | End: 2024-01-01

## 2024-01-01 RX ORDER — TAMSULOSIN HCL 0.4 MG
0.4 CAPSULE ORAL AT BEDTIME
Refills: 0 | Status: DISCONTINUED | OUTPATIENT
Start: 2024-01-01 | End: 2024-01-01

## 2024-01-01 RX ORDER — LATANOPROST 50 UG/ML
1 SOLUTION OPHTHALMIC
Refills: 0 | DISCHARGE

## 2024-01-01 RX ORDER — MILRINONE LACTATE 1 MG/ML
0.25 VIAL (ML) INTRAVENOUS
Qty: 20 | Refills: 0 | Status: DISCONTINUED | OUTPATIENT
Start: 2024-01-01 | End: 2024-01-01

## 2024-01-01 RX ORDER — HYDROMORPHONE HYDROCHLORIDE 1 MG/ML
0.2 INJECTION, SOLUTION INTRAMUSCULAR; INTRAVENOUS; SUBCUTANEOUS EVERY 4 HOURS
Refills: 0 | Status: DISCONTINUED | OUTPATIENT
Start: 2024-01-01 | End: 2024-01-01

## 2024-01-01 RX ORDER — CHLORHEXIDINE GLUCONATE ORAL RINSE 1.2 MG/ML
1 SOLUTION DENTAL DAILY
Refills: 0 | Status: DISCONTINUED | OUTPATIENT
Start: 2024-01-01 | End: 2024-01-01

## 2024-01-01 RX ORDER — BUMETANIDE 2 MG/1
3 TABLET ORAL
Qty: 540 | Refills: 0 | DISCHARGE
Start: 2024-01-01 | End: 2024-10-08

## 2024-01-01 RX ORDER — MUPIROCIN 20 MG/G
1 OINTMENT TOPICAL
Refills: 0 | Status: COMPLETED | OUTPATIENT
Start: 2024-01-01 | End: 2024-01-01

## 2024-01-01 RX ORDER — DOBUTAMINE HCL 250MG/20ML
VIAL (ML) INTRAVENOUS
Qty: 1 | Refills: 10
Start: 2024-01-01

## 2024-01-01 RX ORDER — DOBUTAMINE HCL 250MG/20ML
7.5 VIAL (ML) INTRAVENOUS
Qty: 1000 | Refills: 0 | Status: DISCONTINUED | OUTPATIENT
Start: 2024-01-01 | End: 2024-01-01

## 2024-01-01 RX ORDER — HEPARIN SOD,PORCINE/0.9 % NACL 10 UNIT/ML
500 KIT INTRAVENOUS ONCE
Refills: 0 | Status: DISCONTINUED | OUTPATIENT
Start: 2024-01-01 | End: 2024-01-01

## 2024-01-01 RX ORDER — SODIUM CHLORIDE 0.9 % (FLUSH) 0.9 %
1000 SYRINGE (ML) INJECTION
Refills: 0 | Status: DISCONTINUED | OUTPATIENT
Start: 2024-01-01 | End: 2024-01-01

## 2024-01-01 RX ORDER — SENNOSIDES 8.6 MG
2 TABLET ORAL AT BEDTIME
Refills: 0 | Status: DISCONTINUED | OUTPATIENT
Start: 2024-01-01 | End: 2024-01-01

## 2024-01-01 RX ORDER — HYDROMORPHONE HYDROCHLORIDE 1 MG/ML
0.5 INJECTION, SOLUTION INTRAMUSCULAR; INTRAVENOUS; SUBCUTANEOUS EVERY 6 HOURS
Refills: 0 | Status: DISCONTINUED | OUTPATIENT
Start: 2024-01-01 | End: 2024-01-01

## 2024-01-01 RX ORDER — SCOPOLAMINE 1 MG/3D
1 PATCH, EXTENDED RELEASE TRANSDERMAL
Refills: 0 | Status: DISCONTINUED | OUTPATIENT
Start: 2024-01-01 | End: 2024-01-01

## 2024-01-01 RX ORDER — ASPIRIN 325 MG
81 TABLET ORAL DAILY
Refills: 0 | Status: DISCONTINUED | OUTPATIENT
Start: 2024-01-01 | End: 2024-01-01

## 2024-01-01 RX ORDER — ALCOHOL ANTISEPTIC PADS
50 PADS, MEDICATED (EA) TOPICAL ONCE
Refills: 0 | Status: COMPLETED | OUTPATIENT
Start: 2024-01-01 | End: 2024-01-01

## 2024-01-01 RX ORDER — MIDODRINE HYDROCHLORIDE 5 MG/1
10 TABLET ORAL EVERY 8 HOURS
Refills: 0 | Status: DISCONTINUED | OUTPATIENT
Start: 2024-01-01 | End: 2024-01-01

## 2024-01-01 RX ORDER — 5-HYDROXYTRYPTOPHAN (5-HTP) 100 MG
5 TABLET,DISINTEGRATING ORAL AT BEDTIME
Refills: 0 | Status: DISCONTINUED | OUTPATIENT
Start: 2024-01-01 | End: 2024-01-01

## 2024-01-01 RX ORDER — HYDROMORPHONE HYDROCHLORIDE 1 MG/ML
0.75 INJECTION, SOLUTION INTRAMUSCULAR; INTRAVENOUS; SUBCUTANEOUS EVERY 4 HOURS
Refills: 0 | Status: DISCONTINUED | OUTPATIENT
Start: 2024-01-01 | End: 2024-01-01

## 2024-01-01 RX ORDER — BUMETANIDE 2 MG/1
4 TABLET ORAL
Qty: 20 | Refills: 0 | Status: DISCONTINUED | OUTPATIENT
Start: 2024-01-01 | End: 2024-01-01

## 2024-01-01 RX ORDER — DOBUTAMINE HCL 250MG/20ML
10 VIAL (ML) INTRAVENOUS
Qty: 1 | Refills: 0
Start: 2024-01-01 | End: 2024-10-11

## 2024-01-01 RX ORDER — HYDROMORPHONE HYDROCHLORIDE 1 MG/ML
0.2 INJECTION, SOLUTION INTRAMUSCULAR; INTRAVENOUS; SUBCUTANEOUS
Qty: 100 | Refills: 0 | Status: DISCONTINUED | OUTPATIENT
Start: 2024-01-01 | End: 2024-01-01

## 2024-01-01 RX ORDER — VANCOMYCIN HCL-SODIUM CHLORIDE IV SOLN 1.5 GM/250ML-0.9% 1.5-0.9/25 GM/ML-%
1000 SOLUTION INTRAVENOUS ONCE
Refills: 0 | Status: COMPLETED | OUTPATIENT
Start: 2024-01-01 | End: 2024-01-01

## 2024-01-01 RX ORDER — BUMETANIDE 2 MG/1
4 TABLET ORAL ONCE
Refills: 0 | Status: COMPLETED | OUTPATIENT
Start: 2024-01-01 | End: 2024-01-01

## 2024-01-01 RX ORDER — ALCOHOL ANTISEPTIC PADS
25 PADS, MEDICATED (EA) TOPICAL ONCE
Refills: 0 | Status: DISCONTINUED | OUTPATIENT
Start: 2024-01-01 | End: 2024-01-01

## 2024-01-01 RX ORDER — ACETAMINOPHEN 325 MG
1000 TABLET ORAL ONCE
Refills: 0 | Status: COMPLETED | OUTPATIENT
Start: 2024-01-01 | End: 2024-01-01

## 2024-01-01 RX ORDER — ACETAMINOPHEN 325 MG
1000 TABLET ORAL ONCE
Refills: 0 | Status: DISCONTINUED | OUTPATIENT
Start: 2024-01-01 | End: 2024-01-01

## 2024-01-01 RX ORDER — BISACODYL 5 MG/1
10 TABLET, COATED ORAL DAILY
Refills: 0 | Status: DISCONTINUED | OUTPATIENT
Start: 2024-01-01 | End: 2024-01-01

## 2024-01-01 RX ORDER — ACETAMINOPHEN 325 MG
650 TABLET ORAL EVERY 6 HOURS
Refills: 0 | Status: DISCONTINUED | OUTPATIENT
Start: 2024-01-01 | End: 2024-01-01

## 2024-01-01 RX ORDER — MIRTAZAPINE 30 MG/1
1 TABLET, FILM COATED ORAL
Refills: 0 | DISCHARGE

## 2024-01-01 RX ORDER — BUMETANIDE 2 MG/1
1 TABLET ORAL ONCE
Refills: 0 | Status: COMPLETED | OUTPATIENT
Start: 2024-01-01 | End: 2024-01-01

## 2024-01-01 RX ADMIN — HYDROMORPHONE HYDROCHLORIDE 0.2 MILLIGRAM(S): 1 INJECTION, SOLUTION INTRAMUSCULAR; INTRAVENOUS; SUBCUTANEOUS at 16:29

## 2024-01-01 RX ADMIN — HYDROMORPHONE HYDROCHLORIDE 0.5 MILLIGRAM(S): 1 INJECTION, SOLUTION INTRAMUSCULAR; INTRAVENOUS; SUBCUTANEOUS at 07:52

## 2024-01-01 RX ADMIN — HYDROMORPHONE HYDROCHLORIDE 0.5 MILLIGRAM(S): 1 INJECTION, SOLUTION INTRAMUSCULAR; INTRAVENOUS; SUBCUTANEOUS at 17:36

## 2024-01-01 RX ADMIN — Medication 8.6 MICROGRAM(S)/KG/MIN: at 07:30

## 2024-01-01 RX ADMIN — LIDOCAINE 1 APPLICATION(S): 50 CREAM TOPICAL at 12:26

## 2024-01-01 RX ADMIN — CHLORHEXIDINE GLUCONATE ORAL RINSE 1 APPLICATION(S): 1.2 SOLUTION DENTAL at 05:46

## 2024-01-01 RX ADMIN — Medication 50 MILLIEQUIVALENT(S): at 01:35

## 2024-01-01 RX ADMIN — Medication 0.5 MILLIGRAM(S): at 20:22

## 2024-01-01 RX ADMIN — Medication 1 MILLIGRAM(S): at 09:53

## 2024-01-01 RX ADMIN — HYDROMORPHONE HYDROCHLORIDE 0.2 MILLIGRAM(S): 1 INJECTION, SOLUTION INTRAMUSCULAR; INTRAVENOUS; SUBCUTANEOUS at 15:29

## 2024-01-01 RX ADMIN — Medication 8.6 MICROGRAM(S)/KG/MIN: at 12:42

## 2024-01-01 RX ADMIN — Medication 0.5 MILLIGRAM(S): at 12:22

## 2024-01-01 RX ADMIN — Medication 8.6 MICROGRAM(S)/KG/MIN: at 08:28

## 2024-01-01 RX ADMIN — Medication 0.5 MILLIGRAM(S): at 02:37

## 2024-01-01 RX ADMIN — HYDROMORPHONE HYDROCHLORIDE 0.2 MILLIGRAM(S): 1 INJECTION, SOLUTION INTRAMUSCULAR; INTRAVENOUS; SUBCUTANEOUS at 01:30

## 2024-01-01 RX ADMIN — Medication 100 MILLIEQUIVALENT(S): at 02:07

## 2024-01-01 RX ADMIN — BUMETANIDE 2 MILLIGRAM(S): 2 TABLET ORAL at 20:49

## 2024-01-01 RX ADMIN — Medication 8.6 MICROGRAM(S)/KG/MIN: at 19:36

## 2024-01-01 RX ADMIN — LIDOCAINE 1 APPLICATION(S): 50 CREAM TOPICAL at 18:44

## 2024-01-01 RX ADMIN — CEFEPIME 100 MILLIGRAM(S): 2 INJECTION, POWDER, FOR SOLUTION INTRAVENOUS at 10:44

## 2024-01-01 RX ADMIN — CHLORHEXIDINE GLUCONATE ORAL RINSE 1 APPLICATION(S): 1.2 SOLUTION DENTAL at 05:59

## 2024-01-01 RX ADMIN — Medication 8.6 MICROGRAM(S)/KG/MIN: at 05:19

## 2024-01-01 RX ADMIN — HYDROMORPHONE HYDROCHLORIDE 0.5 MILLIGRAM(S): 1 INJECTION, SOLUTION INTRAMUSCULAR; INTRAVENOUS; SUBCUTANEOUS at 13:44

## 2024-01-01 RX ADMIN — HYDROMORPHONE HYDROCHLORIDE 0.75 MILLIGRAM(S): 1 INJECTION, SOLUTION INTRAMUSCULAR; INTRAVENOUS; SUBCUTANEOUS at 05:55

## 2024-01-01 RX ADMIN — BUMETANIDE 2 MILLIGRAM(S): 2 TABLET ORAL at 05:23

## 2024-01-01 RX ADMIN — Medication 0.5 MILLIGRAM(S): at 22:40

## 2024-01-01 RX ADMIN — Medication 6 UNIT(S)/MIN: at 05:20

## 2024-01-01 RX ADMIN — HYDROMORPHONE HYDROCHLORIDE 0.2 MILLIGRAM(S): 1 INJECTION, SOLUTION INTRAMUSCULAR; INTRAVENOUS; SUBCUTANEOUS at 15:14

## 2024-01-01 RX ADMIN — Medication 12.2 MICROGRAM(S)/KG/MIN: at 13:37

## 2024-01-01 RX ADMIN — Medication 8.6 MICROGRAM(S)/KG/MIN: at 08:55

## 2024-01-01 RX ADMIN — Medication 5 MILLIGRAM(S): at 22:27

## 2024-01-01 RX ADMIN — BUMETANIDE 2 MILLIGRAM(S): 2 TABLET ORAL at 14:01

## 2024-01-01 RX ADMIN — HYDROMORPHONE HYDROCHLORIDE 0.75 MILLIGRAM(S): 1 INJECTION, SOLUTION INTRAMUSCULAR; INTRAVENOUS; SUBCUTANEOUS at 22:06

## 2024-01-01 RX ADMIN — Medication 3 UNIT(S)/MIN: at 02:34

## 2024-01-01 RX ADMIN — HYDROMORPHONE HYDROCHLORIDE 0.5 MILLIGRAM(S): 1 INJECTION, SOLUTION INTRAMUSCULAR; INTRAVENOUS; SUBCUTANEOUS at 13:59

## 2024-01-01 RX ADMIN — Medication 1 MILLIGRAM(S): at 14:17

## 2024-01-01 RX ADMIN — Medication 12.2 MICROGRAM(S)/KG/MIN: at 07:30

## 2024-01-01 RX ADMIN — BUMETANIDE 2 MILLIGRAM(S): 2 TABLET ORAL at 05:19

## 2024-01-01 RX ADMIN — HYDROMORPHONE HYDROCHLORIDE 0.2 MILLIGRAM(S): 1 INJECTION, SOLUTION INTRAMUSCULAR; INTRAVENOUS; SUBCUTANEOUS at 08:30

## 2024-01-01 RX ADMIN — Medication 81 MILLIGRAM(S): at 13:36

## 2024-01-01 RX ADMIN — Medication 50 MILLIEQUIVALENT(S): at 06:37

## 2024-01-01 RX ADMIN — Medication 81 MILLIGRAM(S): at 11:51

## 2024-01-01 RX ADMIN — Medication 6 UNIT(S)/MIN: at 18:31

## 2024-01-01 RX ADMIN — HYDROMORPHONE HYDROCHLORIDE 0.2 MILLIGRAM(S): 1 INJECTION, SOLUTION INTRAMUSCULAR; INTRAVENOUS; SUBCUTANEOUS at 20:05

## 2024-01-01 RX ADMIN — BUMETANIDE 2 MILLIGRAM(S): 2 TABLET ORAL at 19:37

## 2024-01-01 RX ADMIN — Medication 12.2 MICROGRAM(S)/KG/MIN: at 09:08

## 2024-01-01 RX ADMIN — Medication 81 MILLIGRAM(S): at 11:17

## 2024-01-01 RX ADMIN — Medication 0.5 MILLIGRAM(S): at 17:21

## 2024-01-01 RX ADMIN — Medication 5 MILLIGRAM(S): at 00:33

## 2024-01-01 RX ADMIN — Medication 8.6 MICROGRAM(S)/KG/MIN: at 07:47

## 2024-01-01 RX ADMIN — HYDROMORPHONE HYDROCHLORIDE 0.2 MG/HR: 1 INJECTION, SOLUTION INTRAMUSCULAR; INTRAVENOUS; SUBCUTANEOUS at 11:33

## 2024-01-01 RX ADMIN — HYDROMORPHONE HYDROCHLORIDE 0.5 MILLIGRAM(S): 1 INJECTION, SOLUTION INTRAMUSCULAR; INTRAVENOUS; SUBCUTANEOUS at 14:17

## 2024-01-01 RX ADMIN — HYDROMORPHONE HYDROCHLORIDE 0.2 MILLIGRAM(S): 1 INJECTION, SOLUTION INTRAMUSCULAR; INTRAVENOUS; SUBCUTANEOUS at 09:30

## 2024-01-01 RX ADMIN — HYDROMORPHONE HYDROCHLORIDE 0.75 MILLIGRAM(S): 1 INJECTION, SOLUTION INTRAMUSCULAR; INTRAVENOUS; SUBCUTANEOUS at 17:38

## 2024-01-01 RX ADMIN — Medication 1 MILLIGRAM(S): at 17:41

## 2024-01-01 RX ADMIN — MIDODRINE HYDROCHLORIDE 10 MILLIGRAM(S): 5 TABLET ORAL at 21:48

## 2024-01-01 RX ADMIN — Medication 3 UNIT(S)/MIN: at 05:26

## 2024-01-01 RX ADMIN — HYDROMORPHONE HYDROCHLORIDE 0.2 MILLIGRAM(S): 1 INJECTION, SOLUTION INTRAMUSCULAR; INTRAVENOUS; SUBCUTANEOUS at 08:45

## 2024-01-01 RX ADMIN — Medication 8.6 MICROGRAM(S)/KG/MIN: at 19:55

## 2024-01-01 RX ADMIN — HYDROMORPHONE HYDROCHLORIDE 0.2 MG/HR: 1 INJECTION, SOLUTION INTRAMUSCULAR; INTRAVENOUS; SUBCUTANEOUS at 17:53

## 2024-01-01 RX ADMIN — Medication 8.6 MICROGRAM(S)/KG/MIN: at 22:18

## 2024-01-01 RX ADMIN — CHLORHEXIDINE GLUCONATE ORAL RINSE 1 APPLICATION(S): 1.2 SOLUTION DENTAL at 05:34

## 2024-01-01 RX ADMIN — Medication 3 UNIT(S)/MIN: at 19:37

## 2024-01-01 RX ADMIN — Medication 5 MILLIGRAM(S): at 21:48

## 2024-01-01 RX ADMIN — HYDROMORPHONE HYDROCHLORIDE 0.5 MILLIGRAM(S): 1 INJECTION, SOLUTION INTRAMUSCULAR; INTRAVENOUS; SUBCUTANEOUS at 02:58

## 2024-01-01 RX ADMIN — Medication 100 MILLIEQUIVALENT(S): at 06:11

## 2024-01-01 RX ADMIN — CEFEPIME 100 MILLIGRAM(S): 2 INJECTION, POWDER, FOR SOLUTION INTRAVENOUS at 22:03

## 2024-01-01 RX ADMIN — Medication 100 MILLIEQUIVALENT(S): at 01:49

## 2024-01-01 RX ADMIN — CHLORHEXIDINE GLUCONATE ORAL RINSE 1 APPLICATION(S): 1.2 SOLUTION DENTAL at 05:24

## 2024-01-01 RX ADMIN — Medication 1 MILLIGRAM(S): at 10:35

## 2024-01-01 RX ADMIN — CEFEPIME 100 MILLIGRAM(S): 2 INJECTION, POWDER, FOR SOLUTION INTRAVENOUS at 09:13

## 2024-01-01 RX ADMIN — HYDROMORPHONE HYDROCHLORIDE 0.2 MG/HR: 1 INJECTION, SOLUTION INTRAMUSCULAR; INTRAVENOUS; SUBCUTANEOUS at 19:30

## 2024-01-01 RX ADMIN — Medication 5 MILLIGRAM(S): at 21:31

## 2024-01-01 RX ADMIN — Medication 1 MILLIGRAM(S): at 09:44

## 2024-01-01 RX ADMIN — HYDROMORPHONE HYDROCHLORIDE 0.5 MILLIGRAM(S): 1 INJECTION, SOLUTION INTRAMUSCULAR; INTRAVENOUS; SUBCUTANEOUS at 02:43

## 2024-01-01 RX ADMIN — Medication 15 UNIT(S)/MIN: at 07:08

## 2024-01-01 RX ADMIN — Medication 6 UNIT(S)/MIN: at 09:53

## 2024-01-01 RX ADMIN — Medication 8.6 MICROGRAM(S)/KG/MIN: at 11:45

## 2024-01-01 RX ADMIN — HYDROMORPHONE HYDROCHLORIDE 0.2 MILLIGRAM(S): 1 INJECTION, SOLUTION INTRAMUSCULAR; INTRAVENOUS; SUBCUTANEOUS at 22:41

## 2024-01-01 RX ADMIN — HYDROMORPHONE HYDROCHLORIDE 0.2 MILLIGRAM(S): 1 INJECTION, SOLUTION INTRAMUSCULAR; INTRAVENOUS; SUBCUTANEOUS at 20:03

## 2024-01-01 RX ADMIN — Medication 12.2 MICROGRAM(S)/KG/MIN: at 09:13

## 2024-01-01 RX ADMIN — HYDROMORPHONE HYDROCHLORIDE 0.2 MILLIGRAM(S): 1 INJECTION, SOLUTION INTRAMUSCULAR; INTRAVENOUS; SUBCUTANEOUS at 00:50

## 2024-01-01 RX ADMIN — SCOPOLAMINE 1 PATCH: 1 PATCH, EXTENDED RELEASE TRANSDERMAL at 22:00

## 2024-01-01 RX ADMIN — HYDROMORPHONE HYDROCHLORIDE 0.2 MILLIGRAM(S): 1 INJECTION, SOLUTION INTRAMUSCULAR; INTRAVENOUS; SUBCUTANEOUS at 17:45

## 2024-01-01 RX ADMIN — HYDROMORPHONE HYDROCHLORIDE 0.75 MILLIGRAM(S): 1 INJECTION, SOLUTION INTRAMUSCULAR; INTRAVENOUS; SUBCUTANEOUS at 10:00

## 2024-01-01 RX ADMIN — CEFEPIME 100 MILLIGRAM(S): 2 INJECTION, POWDER, FOR SOLUTION INTRAVENOUS at 09:16

## 2024-01-01 RX ADMIN — HYDROMORPHONE HYDROCHLORIDE 0.5 MILLIGRAM(S): 1 INJECTION, SOLUTION INTRAMUSCULAR; INTRAVENOUS; SUBCUTANEOUS at 23:17

## 2024-01-01 RX ADMIN — Medication 12.2 MICROGRAM(S)/KG/MIN: at 00:18

## 2024-01-01 RX ADMIN — Medication 2 TABLET(S): at 22:08

## 2024-01-01 RX ADMIN — Medication 1000 MILLIGRAM(S): at 18:47

## 2024-01-01 RX ADMIN — CHLORHEXIDINE GLUCONATE ORAL RINSE 1 APPLICATION(S): 1.2 SOLUTION DENTAL at 05:38

## 2024-01-01 RX ADMIN — CHLORHEXIDINE GLUCONATE ORAL RINSE 1 APPLICATION(S): 1.2 SOLUTION DENTAL at 05:55

## 2024-01-01 RX ADMIN — CHLORHEXIDINE GLUCONATE ORAL RINSE 1 APPLICATION(S): 1.2 SOLUTION DENTAL at 05:13

## 2024-01-01 RX ADMIN — BISACODYL 10 MILLIGRAM(S): 5 TABLET, COATED ORAL at 23:21

## 2024-01-01 RX ADMIN — HYDROMORPHONE HYDROCHLORIDE 0.5 MILLIGRAM(S): 1 INJECTION, SOLUTION INTRAMUSCULAR; INTRAVENOUS; SUBCUTANEOUS at 06:28

## 2024-01-01 RX ADMIN — Medication 8.6 MICROGRAM(S)/KG/MIN: at 17:45

## 2024-01-01 RX ADMIN — HYDROMORPHONE HYDROCHLORIDE 0.5 MILLIGRAM(S): 1 INJECTION, SOLUTION INTRAMUSCULAR; INTRAVENOUS; SUBCUTANEOUS at 16:25

## 2024-01-01 RX ADMIN — BUMETANIDE 2 MILLIGRAM(S): 2 TABLET ORAL at 05:44

## 2024-01-01 RX ADMIN — Medication 8.6 MICROGRAM(S)/KG/MIN: at 07:31

## 2024-01-01 RX ADMIN — HYDROMORPHONE HYDROCHLORIDE 0.2 MG/HR: 1 INJECTION, SOLUTION INTRAMUSCULAR; INTRAVENOUS; SUBCUTANEOUS at 19:28

## 2024-01-01 RX ADMIN — Medication 9.18 MICROGRAM(S)/KG/MIN: at 12:03

## 2024-01-01 RX ADMIN — HYDROMORPHONE HYDROCHLORIDE 0.5 MILLIGRAM(S): 1 INJECTION, SOLUTION INTRAMUSCULAR; INTRAVENOUS; SUBCUTANEOUS at 01:20

## 2024-01-01 RX ADMIN — BUMETANIDE 1 MILLIGRAM(S): 2 TABLET ORAL at 17:41

## 2024-01-01 RX ADMIN — Medication 8.6 MICROGRAM(S)/KG/MIN: at 19:47

## 2024-01-01 RX ADMIN — HYDROMORPHONE HYDROCHLORIDE 0.2 MG/HR: 1 INJECTION, SOLUTION INTRAMUSCULAR; INTRAVENOUS; SUBCUTANEOUS at 07:16

## 2024-01-01 RX ADMIN — Medication 6 UNIT(S)/MIN: at 13:27

## 2024-01-01 RX ADMIN — Medication 5 MILLIGRAM(S): at 21:20

## 2024-01-01 RX ADMIN — MIDODRINE HYDROCHLORIDE 10 MILLIGRAM(S): 5 TABLET ORAL at 12:28

## 2024-01-01 RX ADMIN — MUPIROCIN 1 APPLICATION(S): 20 OINTMENT TOPICAL at 17:45

## 2024-01-01 RX ADMIN — Medication 0.5 MILLIGRAM(S): at 01:59

## 2024-01-01 RX ADMIN — MIDODRINE HYDROCHLORIDE 10 MILLIGRAM(S): 5 TABLET ORAL at 15:00

## 2024-01-01 RX ADMIN — Medication 0.5 MILLIGRAM(S): at 17:22

## 2024-01-01 RX ADMIN — HYDROMORPHONE HYDROCHLORIDE 0.2 MILLIGRAM(S): 1 INJECTION, SOLUTION INTRAMUSCULAR; INTRAVENOUS; SUBCUTANEOUS at 01:00

## 2024-01-01 RX ADMIN — Medication 0.5 MILLIGRAM(S): at 05:55

## 2024-01-01 RX ADMIN — HYDROMORPHONE HYDROCHLORIDE 0.75 MILLIGRAM(S): 1 INJECTION, SOLUTION INTRAMUSCULAR; INTRAVENOUS; SUBCUTANEOUS at 13:19

## 2024-01-01 RX ADMIN — MUPIROCIN 1 APPLICATION(S): 20 OINTMENT TOPICAL at 17:28

## 2024-01-01 RX ADMIN — HYDROMORPHONE HYDROCHLORIDE 0.2 MILLIGRAM(S): 1 INJECTION, SOLUTION INTRAMUSCULAR; INTRAVENOUS; SUBCUTANEOUS at 10:15

## 2024-01-01 RX ADMIN — SCOPOLAMINE 1 PATCH: 1 PATCH, EXTENDED RELEASE TRANSDERMAL at 07:46

## 2024-01-01 RX ADMIN — Medication 3 UNIT(S)/MIN: at 07:30

## 2024-01-01 RX ADMIN — Medication 1 MILLIGRAM(S): at 17:53

## 2024-01-01 RX ADMIN — Medication 0.5 MILLIGRAM(S): at 20:03

## 2024-01-01 RX ADMIN — Medication 1 MILLIGRAM(S): at 07:52

## 2024-01-01 RX ADMIN — HYDROMORPHONE HYDROCHLORIDE 0.5 MILLIGRAM(S): 1 INJECTION, SOLUTION INTRAMUSCULAR; INTRAVENOUS; SUBCUTANEOUS at 00:58

## 2024-01-01 RX ADMIN — Medication 5 MILLIGRAM(S): at 22:49

## 2024-01-01 RX ADMIN — BUMETANIDE 2 MILLIGRAM(S): 2 TABLET ORAL at 23:41

## 2024-01-01 RX ADMIN — HYDROMORPHONE HYDROCHLORIDE 0.2 MILLIGRAM(S): 1 INJECTION, SOLUTION INTRAMUSCULAR; INTRAVENOUS; SUBCUTANEOUS at 08:28

## 2024-01-01 RX ADMIN — Medication 0.5 MILLIGRAM(S): at 11:46

## 2024-01-01 RX ADMIN — CHLORHEXIDINE GLUCONATE ORAL RINSE 1 APPLICATION(S): 1.2 SOLUTION DENTAL at 05:58

## 2024-01-01 RX ADMIN — Medication 0.5 MILLIGRAM(S): at 12:41

## 2024-01-01 RX ADMIN — BUMETANIDE 2 MILLIGRAM(S): 2 TABLET ORAL at 13:13

## 2024-01-01 RX ADMIN — HYDROMORPHONE HYDROCHLORIDE 0.2 MILLIGRAM(S): 1 INJECTION, SOLUTION INTRAMUSCULAR; INTRAVENOUS; SUBCUTANEOUS at 18:00

## 2024-01-01 RX ADMIN — Medication 8.6 MICROGRAM(S)/KG/MIN: at 07:29

## 2024-01-01 RX ADMIN — HYDROMORPHONE HYDROCHLORIDE 0.2 MILLIGRAM(S): 1 INJECTION, SOLUTION INTRAMUSCULAR; INTRAVENOUS; SUBCUTANEOUS at 20:33

## 2024-01-01 RX ADMIN — Medication 17 GRAM(S): at 05:33

## 2024-01-01 RX ADMIN — Medication 3 UNIT(S)/MIN: at 08:55

## 2024-01-01 RX ADMIN — Medication 81 MILLIGRAM(S): at 10:46

## 2024-01-01 RX ADMIN — Medication 8.6 MICROGRAM(S)/KG/MIN: at 09:53

## 2024-01-01 RX ADMIN — MUPIROCIN 1 APPLICATION(S): 20 OINTMENT TOPICAL at 17:11

## 2024-01-01 RX ADMIN — HYDROMORPHONE HYDROCHLORIDE 0.5 MILLIGRAM(S): 1 INJECTION, SOLUTION INTRAMUSCULAR; INTRAVENOUS; SUBCUTANEOUS at 23:02

## 2024-01-01 RX ADMIN — MUPIROCIN 1 APPLICATION(S): 20 OINTMENT TOPICAL at 05:32

## 2024-01-01 RX ADMIN — HYDROMORPHONE HYDROCHLORIDE 0.2 MILLIGRAM(S): 1 INJECTION, SOLUTION INTRAMUSCULAR; INTRAVENOUS; SUBCUTANEOUS at 21:53

## 2024-01-01 RX ADMIN — HYDROMORPHONE HYDROCHLORIDE 0.2 MILLIGRAM(S): 1 INJECTION, SOLUTION INTRAMUSCULAR; INTRAVENOUS; SUBCUTANEOUS at 21:38

## 2024-01-01 RX ADMIN — Medication 0.5 MILLIGRAM(S): at 23:02

## 2024-01-01 RX ADMIN — Medication 6 UNIT(S)/MIN: at 07:48

## 2024-01-01 RX ADMIN — MIDODRINE HYDROCHLORIDE 10 MILLIGRAM(S): 5 TABLET ORAL at 05:46

## 2024-01-01 RX ADMIN — HYDROMORPHONE HYDROCHLORIDE 0.75 MILLIGRAM(S): 1 INJECTION, SOLUTION INTRAMUSCULAR; INTRAVENOUS; SUBCUTANEOUS at 13:04

## 2024-01-01 RX ADMIN — Medication 81 MILLIGRAM(S): at 11:55

## 2024-01-01 RX ADMIN — HYDROMORPHONE HYDROCHLORIDE 0.2 MILLIGRAM(S): 1 INJECTION, SOLUTION INTRAMUSCULAR; INTRAVENOUS; SUBCUTANEOUS at 22:37

## 2024-01-01 RX ADMIN — Medication 3 UNIT(S)/MIN: at 07:33

## 2024-01-01 RX ADMIN — MIDODRINE HYDROCHLORIDE 10 MILLIGRAM(S): 5 TABLET ORAL at 13:31

## 2024-01-01 RX ADMIN — BUMETANIDE 2 MILLIGRAM(S): 2 TABLET ORAL at 13:26

## 2024-01-01 RX ADMIN — BUMETANIDE 2 MILLIGRAM(S): 2 TABLET ORAL at 22:06

## 2024-01-01 RX ADMIN — Medication 8.6 MICROGRAM(S)/KG/MIN: at 07:08

## 2024-01-01 RX ADMIN — MIDODRINE HYDROCHLORIDE 10 MILLIGRAM(S): 5 TABLET ORAL at 06:12

## 2024-01-01 RX ADMIN — HYDROMORPHONE HYDROCHLORIDE 0.2 MILLIGRAM(S): 1 INJECTION, SOLUTION INTRAMUSCULAR; INTRAVENOUS; SUBCUTANEOUS at 16:39

## 2024-01-01 RX ADMIN — CEFEPIME 100 MILLIGRAM(S): 2 INJECTION, POWDER, FOR SOLUTION INTRAVENOUS at 22:08

## 2024-01-01 RX ADMIN — Medication 8.6 MICROGRAM(S)/KG/MIN: at 22:55

## 2024-01-01 RX ADMIN — Medication 17 GRAM(S): at 22:27

## 2024-01-01 RX ADMIN — HYDROMORPHONE HYDROCHLORIDE 0.2 MILLIGRAM(S): 1 INJECTION, SOLUTION INTRAMUSCULAR; INTRAVENOUS; SUBCUTANEOUS at 20:22

## 2024-01-01 RX ADMIN — CHLORHEXIDINE GLUCONATE ORAL RINSE 1 APPLICATION(S): 1.2 SOLUTION DENTAL at 05:37

## 2024-01-01 RX ADMIN — Medication 75 MILLIGRAM(S): at 10:46

## 2024-01-01 RX ADMIN — SCOPOLAMINE 1 PATCH: 1 PATCH, EXTENDED RELEASE TRANSDERMAL at 07:20

## 2024-01-01 RX ADMIN — MUPIROCIN 1 APPLICATION(S): 20 OINTMENT TOPICAL at 05:33

## 2024-01-01 RX ADMIN — Medication 5 MILLIGRAM(S): at 22:03

## 2024-01-01 RX ADMIN — CHLORHEXIDINE GLUCONATE ORAL RINSE 1 APPLICATION(S): 1.2 SOLUTION DENTAL at 06:42

## 2024-01-01 RX ADMIN — Medication 1 MILLIGRAM(S): at 02:43

## 2024-01-01 RX ADMIN — Medication 0.5 MILLIGRAM(S): at 21:21

## 2024-01-01 RX ADMIN — Medication 3 UNIT(S)/MIN: at 22:18

## 2024-01-01 RX ADMIN — Medication 1 MILLIGRAM(S): at 02:46

## 2024-01-01 RX ADMIN — Medication 8.6 MICROGRAM(S)/KG/MIN: at 07:33

## 2024-01-01 RX ADMIN — Medication 1 MILLIGRAM(S): at 14:03

## 2024-01-01 RX ADMIN — MUPIROCIN 1 APPLICATION(S): 20 OINTMENT TOPICAL at 18:08

## 2024-01-01 RX ADMIN — Medication 7.9 MICROGRAM(S)/KG/MIN: at 15:46

## 2024-01-01 RX ADMIN — BUMETANIDE 2 MILLIGRAM(S): 2 TABLET ORAL at 14:39

## 2024-01-01 RX ADMIN — HYDROMORPHONE HYDROCHLORIDE 0.5 MILLIGRAM(S): 1 INJECTION, SOLUTION INTRAMUSCULAR; INTRAVENOUS; SUBCUTANEOUS at 15:50

## 2024-01-01 RX ADMIN — Medication 1 MILLIGRAM(S): at 17:38

## 2024-01-01 RX ADMIN — HYDROMORPHONE HYDROCHLORIDE 0.2 MILLIGRAM(S): 1 INJECTION, SOLUTION INTRAMUSCULAR; INTRAVENOUS; SUBCUTANEOUS at 05:30

## 2024-01-01 RX ADMIN — HYDROMORPHONE HYDROCHLORIDE 0.75 MILLIGRAM(S): 1 INJECTION, SOLUTION INTRAMUSCULAR; INTRAVENOUS; SUBCUTANEOUS at 09:45

## 2024-01-01 RX ADMIN — BUMETANIDE 2 MILLIGRAM(S): 2 TABLET ORAL at 14:12

## 2024-01-01 RX ADMIN — BUMETANIDE 2 MILLIGRAM(S): 2 TABLET ORAL at 06:28

## 2024-01-01 RX ADMIN — Medication 11.5 MICROGRAM(S)/KG/MIN: at 05:32

## 2024-01-01 RX ADMIN — Medication 100 MILLIEQUIVALENT(S): at 04:03

## 2024-01-01 RX ADMIN — Medication 0.5 MILLIGRAM(S): at 05:22

## 2024-01-01 RX ADMIN — Medication 100 MILLIEQUIVALENT(S): at 01:09

## 2024-01-01 RX ADMIN — Medication 3 UNIT(S)/MIN: at 08:28

## 2024-01-01 RX ADMIN — Medication 6 UNIT(S)/MIN: at 19:47

## 2024-01-01 RX ADMIN — BUMETANIDE 2 MILLIGRAM(S): 2 TABLET ORAL at 21:31

## 2024-01-01 RX ADMIN — HYDROMORPHONE HYDROCHLORIDE 0.75 MILLIGRAM(S): 1 INJECTION, SOLUTION INTRAMUSCULAR; INTRAVENOUS; SUBCUTANEOUS at 03:01

## 2024-01-01 RX ADMIN — HYDROMORPHONE HYDROCHLORIDE 0.2 MILLIGRAM(S): 1 INJECTION, SOLUTION INTRAMUSCULAR; INTRAVENOUS; SUBCUTANEOUS at 08:01

## 2024-01-01 RX ADMIN — BUMETANIDE 2 MILLIGRAM(S): 2 TABLET ORAL at 08:51

## 2024-01-01 RX ADMIN — HYDROMORPHONE HYDROCHLORIDE 0.2 MILLIGRAM(S): 1 INJECTION, SOLUTION INTRAMUSCULAR; INTRAVENOUS; SUBCUTANEOUS at 10:00

## 2024-01-01 RX ADMIN — Medication 1 MILLIGRAM(S): at 01:31

## 2024-01-01 RX ADMIN — HYDROMORPHONE HYDROCHLORIDE 0.2 MILLIGRAM(S): 1 INJECTION, SOLUTION INTRAMUSCULAR; INTRAVENOUS; SUBCUTANEOUS at 00:15

## 2024-01-01 RX ADMIN — BUMETANIDE 2 MILLIGRAM(S): 2 TABLET ORAL at 21:21

## 2024-01-01 RX ADMIN — VANCOMYCIN HCL-SODIUM CHLORIDE IV SOLN 1.5 GM/250ML-0.9% 250 MILLIGRAM(S): 1.5-0.9/25 SOLUTION at 13:37

## 2024-01-01 RX ADMIN — Medication 15 UNIT(S)/MIN: at 05:20

## 2024-01-01 RX ADMIN — Medication 8.6 MICROGRAM(S)/KG/MIN: at 05:25

## 2024-01-01 RX ADMIN — BUMETANIDE 2 MILLIGRAM(S): 2 TABLET ORAL at 02:13

## 2024-01-01 RX ADMIN — CHLORHEXIDINE GLUCONATE ORAL RINSE 1 APPLICATION(S): 1.2 SOLUTION DENTAL at 05:26

## 2024-01-01 RX ADMIN — BUMETANIDE 2 MILLIGRAM(S): 2 TABLET ORAL at 05:35

## 2024-01-01 RX ADMIN — Medication 12.2 MICROGRAM(S)/KG/MIN: at 19:19

## 2024-01-01 RX ADMIN — Medication 5.73 MICROGRAM(S)/KG/MIN: at 08:03

## 2024-01-01 RX ADMIN — Medication 1 MILLIGRAM(S): at 21:58

## 2024-01-01 RX ADMIN — MUPIROCIN 1 APPLICATION(S): 20 OINTMENT TOPICAL at 17:46

## 2024-01-01 RX ADMIN — SCOPOLAMINE 1 PATCH: 1 PATCH, EXTENDED RELEASE TRANSDERMAL at 18:13

## 2024-01-01 RX ADMIN — Medication 0.5 MILLIGRAM(S): at 00:13

## 2024-01-01 RX ADMIN — HYDROMORPHONE HYDROCHLORIDE 0.2 MILLIGRAM(S): 1 INJECTION, SOLUTION INTRAMUSCULAR; INTRAVENOUS; SUBCUTANEOUS at 19:01

## 2024-01-01 RX ADMIN — Medication 6 UNIT(S)/MIN: at 07:28

## 2024-01-01 RX ADMIN — HYDROMORPHONE HYDROCHLORIDE 0.75 MILLIGRAM(S): 1 INJECTION, SOLUTION INTRAMUSCULAR; INTRAVENOUS; SUBCUTANEOUS at 21:58

## 2024-01-01 RX ADMIN — Medication 6 UNIT(S)/MIN: at 22:49

## 2024-01-01 RX ADMIN — Medication 0.5 MILLIGRAM(S): at 06:42

## 2024-01-01 RX ADMIN — Medication 1 MILLIGRAM(S): at 13:57

## 2024-01-01 RX ADMIN — Medication 400 MILLIGRAM(S): at 18:32

## 2024-01-01 RX ADMIN — BUMETANIDE 2 MILLIGRAM(S): 2 TABLET ORAL at 17:09

## 2024-01-01 RX ADMIN — MUPIROCIN 1 APPLICATION(S): 20 OINTMENT TOPICAL at 05:13

## 2024-01-01 RX ADMIN — HYDROMORPHONE HYDROCHLORIDE 0.75 MILLIGRAM(S): 1 INJECTION, SOLUTION INTRAMUSCULAR; INTRAVENOUS; SUBCUTANEOUS at 02:46

## 2024-01-01 RX ADMIN — HYDROMORPHONE HYDROCHLORIDE 0.2 MILLIGRAM(S): 1 INJECTION, SOLUTION INTRAMUSCULAR; INTRAVENOUS; SUBCUTANEOUS at 02:36

## 2024-01-01 RX ADMIN — Medication 5 UNIT(S): at 00:18

## 2024-01-01 RX ADMIN — Medication 5 MILLIGRAM(S): at 22:17

## 2024-01-01 RX ADMIN — SCOPOLAMINE 1 PATCH: 1 PATCH, EXTENDED RELEASE TRANSDERMAL at 06:53

## 2024-01-01 RX ADMIN — HYDROMORPHONE HYDROCHLORIDE 0.5 MILLIGRAM(S): 1 INJECTION, SOLUTION INTRAMUSCULAR; INTRAVENOUS; SUBCUTANEOUS at 05:38

## 2024-01-01 RX ADMIN — SCOPOLAMINE 1 PATCH: 1 PATCH, EXTENDED RELEASE TRANSDERMAL at 19:14

## 2024-01-01 RX ADMIN — Medication 1 MILLIGRAM(S): at 05:43

## 2024-01-01 RX ADMIN — HYDROMORPHONE HYDROCHLORIDE 0.2 MILLIGRAM(S): 1 INJECTION, SOLUTION INTRAMUSCULAR; INTRAVENOUS; SUBCUTANEOUS at 16:11

## 2024-01-01 RX ADMIN — Medication 0.5 MILLIGRAM(S): at 05:47

## 2024-01-01 RX ADMIN — CEFEPIME 100 MILLIGRAM(S): 2 INJECTION, POWDER, FOR SOLUTION INTRAVENOUS at 10:15

## 2024-01-01 RX ADMIN — Medication 50 MILLILITER(S): at 00:12

## 2024-01-01 RX ADMIN — MUPIROCIN 1 APPLICATION(S): 20 OINTMENT TOPICAL at 05:34

## 2024-01-01 RX ADMIN — HYDROMORPHONE HYDROCHLORIDE 0.2 MILLIGRAM(S): 1 INJECTION, SOLUTION INTRAMUSCULAR; INTRAVENOUS; SUBCUTANEOUS at 06:00

## 2024-01-01 RX ADMIN — HYDROMORPHONE HYDROCHLORIDE 0.2 MG/HR: 1 INJECTION, SOLUTION INTRAMUSCULAR; INTRAVENOUS; SUBCUTANEOUS at 21:39

## 2024-01-01 RX ADMIN — HYDROMORPHONE HYDROCHLORIDE 0.2 MILLIGRAM(S): 1 INJECTION, SOLUTION INTRAMUSCULAR; INTRAVENOUS; SUBCUTANEOUS at 09:45

## 2024-01-01 RX ADMIN — Medication 5 MILLIGRAM(S): at 21:21

## 2024-01-01 RX ADMIN — Medication 0.5 MILLIGRAM(S): at 17:33

## 2024-01-01 RX ADMIN — CEFEPIME 100 MILLIGRAM(S): 2 INJECTION, POWDER, FOR SOLUTION INTRAVENOUS at 21:18

## 2024-01-01 RX ADMIN — BUMETANIDE 2 MILLIGRAM(S): 2 TABLET ORAL at 22:17

## 2024-01-01 RX ADMIN — Medication 6 UNIT(S)/MIN: at 00:52

## 2024-01-01 RX ADMIN — SCOPOLAMINE 1 PATCH: 1 PATCH, EXTENDED RELEASE TRANSDERMAL at 21:10

## 2024-01-01 RX ADMIN — Medication 4 MILLIGRAM(S): at 19:37

## 2024-01-01 RX ADMIN — Medication 81 MILLIGRAM(S): at 11:05

## 2024-01-01 RX ADMIN — MIDODRINE HYDROCHLORIDE 10 MILLIGRAM(S): 5 TABLET ORAL at 05:19

## 2024-01-01 RX ADMIN — Medication 6 UNIT(S)/MIN: at 06:11

## 2024-01-01 RX ADMIN — HYDROMORPHONE HYDROCHLORIDE 0.2 MILLIGRAM(S): 1 INJECTION, SOLUTION INTRAMUSCULAR; INTRAVENOUS; SUBCUTANEOUS at 22:17

## 2024-01-01 RX ADMIN — CHLORHEXIDINE GLUCONATE ORAL RINSE 1 APPLICATION(S): 1.2 SOLUTION DENTAL at 07:17

## 2024-01-01 RX ADMIN — Medication 1 MILLIGRAM(S): at 01:19

## 2024-01-01 RX ADMIN — Medication 1 MILLIGRAM(S): at 13:25

## 2024-01-01 RX ADMIN — Medication 100 MILLIGRAM(S): at 00:27

## 2024-01-01 RX ADMIN — Medication 6 UNIT(S)/MIN: at 18:38

## 2024-01-01 RX ADMIN — Medication 10 MILLILITER(S): at 11:35

## 2024-01-01 RX ADMIN — HYDROMORPHONE HYDROCHLORIDE 0.5 MILLIGRAM(S): 1 INJECTION, SOLUTION INTRAMUSCULAR; INTRAVENOUS; SUBCUTANEOUS at 17:21

## 2024-01-01 RX ADMIN — Medication 1 MILLIGRAM(S): at 05:55

## 2024-01-01 RX ADMIN — HYDROMORPHONE HYDROCHLORIDE 0.2 MILLIGRAM(S): 1 INJECTION, SOLUTION INTRAMUSCULAR; INTRAVENOUS; SUBCUTANEOUS at 19:35

## 2024-01-01 RX ADMIN — Medication 40 MILLIEQUIVALENT(S): at 01:26

## 2024-01-01 RX ADMIN — Medication 0.4 MILLIGRAM(S): at 18:12

## 2024-01-01 RX ADMIN — Medication 6 UNIT(S)/MIN: at 16:10

## 2024-01-01 RX ADMIN — Medication 11.5 MICROGRAM(S)/KG/MIN: at 14:45

## 2024-01-01 RX ADMIN — Medication 8.6 MICROGRAM(S)/KG/MIN: at 05:35

## 2024-01-01 RX ADMIN — CHLORHEXIDINE GLUCONATE ORAL RINSE 1 APPLICATION(S): 1.2 SOLUTION DENTAL at 06:12

## 2024-01-01 RX ADMIN — Medication 75 MILLIGRAM(S): at 11:17

## 2024-01-01 RX ADMIN — BUMETANIDE 2 MILLIGRAM(S): 2 TABLET ORAL at 06:00

## 2024-01-01 RX ADMIN — CEFEPIME 100 MILLIGRAM(S): 2 INJECTION, POWDER, FOR SOLUTION INTRAVENOUS at 21:00

## 2024-01-01 RX ADMIN — Medication 6 UNIT(S)/MIN: at 07:32

## 2024-01-01 RX ADMIN — Medication 0.5 MILLIGRAM(S): at 00:50

## 2024-01-01 RX ADMIN — Medication 12.2 MICROGRAM(S)/KG/MIN: at 22:02

## 2024-01-01 RX ADMIN — BUMETANIDE 20 MG/HR: 2 TABLET ORAL at 09:08

## 2024-01-01 RX ADMIN — HYDROMORPHONE HYDROCHLORIDE 0.2 MILLIGRAM(S): 1 INJECTION, SOLUTION INTRAMUSCULAR; INTRAVENOUS; SUBCUTANEOUS at 16:24

## 2024-01-01 RX ADMIN — HYDROMORPHONE HYDROCHLORIDE 0.2 MILLIGRAM(S): 1 INJECTION, SOLUTION INTRAMUSCULAR; INTRAVENOUS; SUBCUTANEOUS at 23:00

## 2024-01-01 RX ADMIN — Medication 3 UNIT(S)/MIN: at 19:36

## 2024-01-01 RX ADMIN — HYDROMORPHONE HYDROCHLORIDE 0.5 MILLIGRAM(S): 1 INJECTION, SOLUTION INTRAMUSCULAR; INTRAVENOUS; SUBCUTANEOUS at 06:43

## 2024-01-01 RX ADMIN — Medication 6 UNIT(S)/MIN: at 19:55

## 2024-01-01 RX ADMIN — HYDROMORPHONE HYDROCHLORIDE 0.75 MILLIGRAM(S): 1 INJECTION, SOLUTION INTRAMUSCULAR; INTRAVENOUS; SUBCUTANEOUS at 17:53

## 2024-01-01 RX ADMIN — Medication 50 MILLILITER(S): at 00:19

## 2024-01-01 RX ADMIN — Medication 8.6 MICROGRAM(S)/KG/MIN: at 17:56

## 2024-01-01 RX ADMIN — Medication 0.5 MILLIGRAM(S): at 18:37

## 2024-01-01 RX ADMIN — BUMETANIDE 20 MG/HR: 2 TABLET ORAL at 00:17

## 2024-01-01 RX ADMIN — Medication 1 MILLIGRAM(S): at 16:25

## 2024-01-01 RX ADMIN — Medication 6 UNIT(S)/MIN: at 11:32

## 2024-01-01 RX ADMIN — Medication 2 TABLET(S): at 22:27

## 2024-01-01 RX ADMIN — HYDROMORPHONE HYDROCHLORIDE 0.2 MILLIGRAM(S): 1 INJECTION, SOLUTION INTRAMUSCULAR; INTRAVENOUS; SUBCUTANEOUS at 22:47

## 2024-01-01 RX ADMIN — CHLORHEXIDINE GLUCONATE ORAL RINSE 1 APPLICATION(S): 1.2 SOLUTION DENTAL at 05:33

## 2024-01-01 RX ADMIN — HYDROMORPHONE HYDROCHLORIDE 0.5 MILLIGRAM(S): 1 INJECTION, SOLUTION INTRAMUSCULAR; INTRAVENOUS; SUBCUTANEOUS at 01:35

## 2024-01-01 RX ADMIN — CEFEPIME 100 MILLIGRAM(S): 2 INJECTION, POWDER, FOR SOLUTION INTRAVENOUS at 10:45

## 2024-01-01 RX ADMIN — MUPIROCIN 1 APPLICATION(S): 20 OINTMENT TOPICAL at 05:25

## 2024-01-01 RX ADMIN — Medication 0.5 MILLIGRAM(S): at 00:09

## 2024-01-01 RX ADMIN — Medication 75 MILLIGRAM(S): at 11:05

## 2024-01-01 RX ADMIN — CHLORHEXIDINE GLUCONATE ORAL RINSE 1 APPLICATION(S): 1.2 SOLUTION DENTAL at 05:20

## 2024-01-01 RX ADMIN — Medication 11.5 MICROGRAM(S)/KG/MIN: at 19:22

## 2024-01-01 RX ADMIN — Medication 8.6 MICROGRAM(S)/KG/MIN: at 19:37

## 2024-01-01 RX ADMIN — HYDROMORPHONE HYDROCHLORIDE 0.5 MILLIGRAM(S): 1 INJECTION, SOLUTION INTRAMUSCULAR; INTRAVENOUS; SUBCUTANEOUS at 05:23

## 2024-01-01 RX ADMIN — Medication 10 MILLILITER(S): at 19:29

## 2024-01-01 RX ADMIN — Medication 75 MILLIGRAM(S): at 11:51

## 2024-01-01 RX ADMIN — HYDROMORPHONE HYDROCHLORIDE 0.75 MILLIGRAM(S): 1 INJECTION, SOLUTION INTRAMUSCULAR; INTRAVENOUS; SUBCUTANEOUS at 06:10

## 2024-01-01 RX ADMIN — HYDROMORPHONE HYDROCHLORIDE 0.2 MILLIGRAM(S): 1 INJECTION, SOLUTION INTRAMUSCULAR; INTRAVENOUS; SUBCUTANEOUS at 00:30

## 2024-01-01 RX ADMIN — BUMETANIDE 132 MILLIGRAM(S): 2 TABLET ORAL at 00:39

## 2024-01-01 RX ADMIN — BUMETANIDE 2 MILLIGRAM(S): 2 TABLET ORAL at 15:31

## 2024-01-01 RX ADMIN — Medication 8.6 MICROGRAM(S)/KG/MIN: at 19:07

## 2024-01-01 RX ADMIN — BUMETANIDE 2 MILLIGRAM(S): 2 TABLET ORAL at 21:20

## 2024-01-01 RX ADMIN — Medication 6 UNIT(S)/MIN: at 06:07

## 2024-01-01 RX ADMIN — Medication 1 MILLIGRAM(S): at 11:31

## 2024-01-01 RX ADMIN — SCOPOLAMINE 1 PATCH: 1 PATCH, EXTENDED RELEASE TRANSDERMAL at 22:18

## 2024-01-01 RX ADMIN — Medication 75 MILLIGRAM(S): at 11:55

## 2024-01-01 RX ADMIN — Medication 8.6 MICROGRAM(S)/KG/MIN: at 12:08

## 2024-01-01 RX ADMIN — Medication 1 MILLIGRAM(S): at 21:47

## 2024-01-01 RX ADMIN — HYDROMORPHONE HYDROCHLORIDE 0.5 MILLIGRAM(S): 1 INJECTION, SOLUTION INTRAMUSCULAR; INTRAVENOUS; SUBCUTANEOUS at 16:40

## 2024-01-01 RX ADMIN — Medication 40 MILLIEQUIVALENT(S): at 16:00

## 2024-01-01 RX ADMIN — HYDROMORPHONE HYDROCHLORIDE 0.2 MILLIGRAM(S): 1 INJECTION, SOLUTION INTRAMUSCULAR; INTRAVENOUS; SUBCUTANEOUS at 02:21

## 2024-01-02 ENCOUNTER — APPOINTMENT (OUTPATIENT)
Dept: HEMATOLOGY ONCOLOGY | Facility: CLINIC | Age: 79
End: 2024-01-02
Payer: MEDICARE

## 2024-01-02 VITALS
SYSTOLIC BLOOD PRESSURE: 96 MMHG | WEIGHT: 178 LBS | DIASTOLIC BLOOD PRESSURE: 58 MMHG | BODY MASS INDEX: 24.83 KG/M2 | OXYGEN SATURATION: 97 % | TEMPERATURE: 97.9 F | HEART RATE: 64 BPM | RESPIRATION RATE: 17 BRPM

## 2024-01-02 PROCEDURE — 99214 OFFICE O/P EST MOD 30 MIN: CPT

## 2024-01-02 NOTE — HISTORY OF PRESENT ILLNESS
[Disease: _____________________] : Disease: [unfilled] [T: ___] : T[unfilled] [N: ___] : N[unfilled] [AJCC Stage: ____] : AJCC Stage: [unfilled] [de-identified] : Moderately differentiated invasive acinar adenocarcinoma [de-identified] : 9/2022-PD-L1 () TPS 0%. ALK negative.  EGFR mutation analysis-+ EGFR mutation ([id number], CTG>CGG).\par  1/31/2023-Liquid biopsy-no therapies associated with the genomic findings in this sample. KATHY, 3 Muts/Mb.  [de-identified] : 78 year old male, never smoker, w/ hx of bipolar, BPH, SCC of leg, CHF, CAD, hospitalized on May 11-17/2022 for acute MI s/p cardiac Cath with no intervention (LHC showed 100% occlusion for right coronary artery and 70% occlusion of left anterior descending artery. The arteries were not intervened upon due to viability study showing no viability in the areas supplied by the occluded arteries), cardiogenic shock, found to have new 1x 1.3cm LV thrombus on Plavix and Coumadin (Coumadin completed on 08/26/2022), who f/u with cardiologist and c/o SOB, CXR showed RUL nodule.  7/11/2022-CT chest-3.5 cm part solid mass is noted in the right upper lobe as described above. Primary consideration is lung carcinoma.  7/28/2022-PET/CT scan-FDG avid partly solid right upper lung nodule suspicious for malignancy. Small bilateral pleural effusion with minimal FDG avidity, nonspecific. Nonspecific mildly FDG avid low-attenuation left adrenal nodule, possibly an adenoma. Heterogeneous FDG activity in the soft tissue associated with surgical clips overlying the symphysis pubis, probably related from prior procedure.  9/14/20223338-CE-sqawxp biopsy of right upper lung mass-positive for malignant cells, adenocarcinoma. PD-L1 () TPS 0%. ALK negative. EGFR mutation analysis-+ EGFR mutation ([id number], CTG>CGG).  9/23/2022-Status post right VATS-right upper lobectomy-pathology revealed adenocarcinoma, acinar predominant, margin negative. One level 10 lymph node excised and 5 interlobar lymph nodes negative for carcinoma. Right pleural fluid negative for malignant cells. Intraoperative course complicated by cardiogenic shock requiring inotropic support and diuretics.  10/21/2020 2-2 cm lobulated opacity in the left upper lobe, new compared to prior exam.  1/3/2023-CT angiogram of the chest-negative for PE. Mildly increased moderate loculated right pleural effusion since 12/27/2022. Indeterminant 1.7 cm apical posterior left upper lobe nodule, enlarged since 7/2022 (4 mm).  1/25/2023-PET/CT scan-hypermetabolic left upper lobe opacity highly suspicious for malignancy. Small left pleural effusion. No hypermetabolic hilar or mediastinal nodes. Postsurgical changes in the right upper lung field. No suspicious FDG avid lesion at the surgical site. Right pleural effusion slightly decreased from the recent CT chest. Contiguous foci of activity localizing to the vertebral ends of the left 10th-12th ribs with a new fracture in the 11th rib since the recent CT angiogram of the chest. Pattern of abnormalities compatible with traumatic injury, however, in the presence of a hypermetabolic left upper lobe mass, need to follow closely.  1/9/2023-S/P Right thoracentesis, 850 ml fluid was drained. Path revealed negative for malignant cells.  3/1/2023-Completed radiation therapy. Planned Dose: 5 Fx/ 5000 cGy. Treatment Site: Left Lung.  8/8/2023-PET/CT scan-Redemonstration of a left upper lobe lobulated lung nodule that is stable in size (anatomically) and has slightly decreased metabolically (FDG uptake). Continued follow-up as clinically indicated.  12/18/2023-CT chest-1. Post right upper lobe lobectomy. No evidence of local disease recurrence. 2. Findings compatible with small large airways inflammation more pronounced when compared to prior imaging. 3. A nodular lesion within the apical posterior segment of the left upper lobe which appears more nodular configuration measuring 32 x 20 mm (333:142). Findings concerning for left upper lobe neoplasm and correlation with whole-body PET/CT is suggested. 4. Bibasilar pleural effusions which have decreased slightly in extent on the right side. 5. Mediastinal as well as paraesophageal lymphadenopath slightly more pronounced. 6. New groundglass opacity within the superior aspect of the residual right middle lobe. Short interval surveillance in 3-6 months is suggested.   Disease: Right upper lung   Pathology: Moderately differentiated invasive acinar adenocarcinoma   TNM stage: T2a (3.5 cm), N0 AJCC Stage: IB   9/2022-PD-L1 () TPS 0%. ALK negative. EGFR mutation analysis-+ EGFR mutation ([id number], CTG>CGG). 1/31/2023-Liquid biopsy-no therapies associated with the genomic findings in this sample. KATHY, 3 Muts/Mb. [de-identified] : S/P hospitalization for SOB/RSV.  Had CT chest, with f/u PET scan scheduled1/14/24. Taking 1 iron tab daily. Has f/u appt. with Dr. Alejandre 1/20/2023. No current complaints of chest pain; no hemoptysis; no fevers.  No complaints of bone pain.  No GI complaints. Remains on Plavix.  Sees Dr. Yu regularly (Psych). Has anxiety, insomnia.

## 2024-01-02 NOTE — ASSESSMENT
[FreeTextEntry1] : Lab work, CT chest report, 10/12/23 radiation oncology note reviewed. #1) 9/2022-Stage IB (pT2aN0) adenocarcinoma of the lung, moderately differentiated-status post right upper lobectomy/lymph node sampling. Intraoperative course complicated by cardiogenic shock. PD-L1 () TPS 0%. ALK negative. EGFR mutation analysis-+ EGFR mutation ([id number], CTG>CGG).  Per NCCN guidelines, may consider observation with expectant surveillance or chemotherapy for high risk patients and osimertinib (if EGFR exon 19 deletion or L858R). Examples of high risk features may include poorly differentiated tumors, vascular invasion, wedge resection, tumors greater than 4 cm, visceral pleural involvement or unknown lymph node status-these features do not apply in this case-in light of this and patient's comorbidities, favored expectant surveillance approach. Had discussed the EGFR mutation with potential implications in the future with treatment available if needed.  1/3/2023-CT angiogram of the chest-negative for PE. Mildly increased moderate loculated right pleural effusion since 12/27/2022. Indeterminant 1.7 cm apical posterior left upper lobe nodule, enlarged since 7/2022 (4 mm). 1/9/2023-S/P Right thoracentesis, 850 ml fluid was drained. Path revealed negative for malignant cells.  1/25/2023-PET/CT scan-hypermetabolic left upper lobe opacity highly suspicious for malignancy. Small left pleural effusion. No hypermetabolic hilar or mediastinal nodes. Postsurgical changes in the right upper lung field. No suspicious FDG avid lesion at the surgical site. Right pleural effusion slightly decreased from the recent CT chest. Contiguous foci of activity localizing to the vertebral ends of the left 10th-12th ribs with a new fracture in the 11th rib since the recent CT angiogram of the chest. Pattern of abnormalities compatible with traumatic injury, however, in the presence of a hypermetabolic left upper lobe mass, need to follow closely.  So, had enlarging left lung lesion suggestive of neoplastic disease-?second primary vs. met. lesion. Patient saw thoracic surgery and was deemed not to be a surgical candidate in light of comorbidities. He was referred to radiation oncology for radiation therapy-completed 3/1/2023-5 Fx/ 5000 cGy. Treatment Site: Left Lung. Had discussed potential role for systemic therapy-note +EGFR mutation with right-sided tumor. However, F/U liquid biopsy at this time -1/31/2023-no therapies associated with the genomic findings in this sample. KATHY, 3 Muts/Mb.  5/1/2023-CT chest-status post right upper lobectomy. No significant change in the 1.6 cm lobulated nodule in the left upper lobe when compared to previous exam. Once again, exact etiology is unclear. Patient with multiple comorbidities, so expectant surveillance.  8/8/2023-PET/CT scan-redemonstration of a left upper lobe lobulated lung nodule that is stable in size (anatomically) and has slightly decreased metabolically (FDG uptake). Continued follow-up as clinically indicated. 12/18/2023-CT chest-1. Post right upper lobe lobectomy. No evidence of local disease recurrence. 2. Findings compatible with small large airways inflammation more pronounced when compared to prior imaging. 3. A nodular lesion within the apical posterior segment of the left upper lobe which appears more nodular configuration measuring 32 x 20 mm (333:142). Findings concerning for left upper lobe neoplasm and correlation with whole-body PET/CT is suggested. 4. Bibasilar pleural effusions which have decreased slightly in extent on the right side. 5. Mediastinal as well as paraesophageal lymphadenopath slightly more pronounced. 6. New groundglass opacity within the superior aspect of the residual right middle lobe. Short interval surveillance in 3-6 months is suggested. --Patient to have PET/CT chest as scheduled-to then consider management options.  #2) h/o anemia/thrombocytopenia. h/o Intermittent mild epistaxis and renal insufficiency suspect contribute to the anemia. F/U lab work to be done. Should hematologic scenario worsen, can consider further evaluation to r/o evolving BM disorder, should patient consent.  #3) F/U with cardiologist for elevated creatinine/BP med adjustment as needed; f/u with PCP for primary care issues.  Patient was given the opportunity to ask questions. His questions have been answered to his apparent satisfaction at this time.  -->RTO 3 weeks.

## 2024-01-04 ENCOUNTER — APPOINTMENT (OUTPATIENT)
Dept: HEART FAILURE | Facility: CLINIC | Age: 79
End: 2024-01-04
Payer: MEDICARE

## 2024-01-04 ENCOUNTER — NON-APPOINTMENT (OUTPATIENT)
Age: 79
End: 2024-01-04

## 2024-01-04 VITALS
BODY MASS INDEX: 26.18 KG/M2 | WEIGHT: 187 LBS | HEART RATE: 60 BPM | SYSTOLIC BLOOD PRESSURE: 103 MMHG | HEIGHT: 71 IN | DIASTOLIC BLOOD PRESSURE: 66 MMHG

## 2024-01-04 PROCEDURE — 93000 ELECTROCARDIOGRAM COMPLETE: CPT

## 2024-01-04 PROCEDURE — 99214 OFFICE O/P EST MOD 30 MIN: CPT

## 2024-01-04 RX ORDER — METOLAZONE 2.5 MG/1
2.5 TABLET ORAL
Qty: 5 | Refills: 1 | Status: DISCONTINUED | COMMUNITY
Start: 2023-11-16 | End: 2024-01-04

## 2024-01-04 NOTE — PHYSICAL EXAM
[No Acute Distress] : no acute distress [No Carotid Bruit] : no carotid bruit [Normal S1, S2] : normal S1, S2 [No Gallop] : no gallop [Clear Lung Fields] : clear lung fields [Good Air Entry] : good air entry [No Respiratory Distress] : no respiratory distress  [Soft] : abdomen soft [Non Tender] : non-tender [No Rash] : no rash [Moves all extremities] : moves all extremities [Alert and Oriented] : alert and oriented [de-identified] : Frail, elderly male [de-identified] : JVP 10 cm  [de-identified] : Decreased at bases but clear with no wheezing [de-identified] : RRR [de-identified] : slow gait [de-identified] : 1+ edema of lower extremities

## 2024-01-04 NOTE — ASSESSMENT
[FreeTextEntry1] : 78-year-old Male with PMH of bipolar disorder, CAD/STEMI (5/2022), severe 3VD, RHC showing elevated filling pressures and PA sat 59%, CO 3.63, CI 1.70, PVR 2.76.  S/P left lung lesion S/P RT 3/1/23. Pt is here for a [facility] follow up after admission at [facility] 12/18-12/21/23 for RSV treatment and readmission 12/22/-12/23/23 for shortness of breath.    Adenocarcinoma, lung, right (162.9) (C34.91) Edema (782.3) (R60.9) Acute on chronic systolic congestive heart failure (428.23,428.0) (I50.23) Severe ischemic CMP Stage C HF, NYHA class 3 Lung ca CKD with last SCr~2 from 1.7 Appears mildly volume overloaded and with B/P 92/57 and with B/P 97/62

## 2024-01-04 NOTE — CARDIOLOGY SUMMARY
[de-identified] : 1/4/24 NSR with 1 st degree AVB 61, LAFB, NSST 11/16/23 NSR 61 with 1 st degree AVB 61. LAFB, PRWP, NSST 11/2/23 NSR 68 with 1 st degree AVB 61. LAFB, PRWP, NSST 10/23/23 NSR 68 with 1 st degree AVB 63. LAFB, PRWP, NSST 10/16/23  NSR 74 with 1 st degree AVB 63. LAFB, PRWP, NSST 8/10/23 NSR 67 with 1 st degree AVB 63. LAFB, PRWP, NSST 5/4/23 NSR 67 with 1 st degree AVB 63. LAFB, PRWP, NSST 3/6/23 NSR with 1 st degree AVB 63. LAFB, PRWP, NSST 1/26/23 NSR with 1 st degree AVB 74. LAFB, PRWP, NSST 11/28/22 NSR with 1 st degree AVB 70. LAFB, PRWP, NSST 10/27/22  NSR 71,  LAFB, PRWP, NSST  10/13/22 NSR 98, LAFB, PRWP, NSST with PVC's [de-identified] : 7/31/23 TTE with LVEF 23%, LVIDD 6 cm, mod severe MR, mild diastolic dysfunction, severe LV systolic dysfunction, no LV thrombus, decreased RV systolic function  TTE 1/4/23 with LVEF 27%, LVIDD 6.5 cm , no LV thrombus, severe LV systolic dysfunction, decreased RV systolic dysfunction, severe MR, mild TR.    11/28/22 TTE; LVEF 22%, LVIDD 6.1 cm, mod MR, severe global LV systolic dysfunction, moderate diastolic dysfunction Stage II, with decreased RV systolic function, severe pHTN  9/24/22 TTE; LVEF 34%, LVIDD 6.1 cm, mod MR, mild AR, normal LA, severe global LV systolic dysfunction, moderate diastolic dysfunction Stage II, normal RV systolic function. mod TR, RV systolic function 82 mmHg c/q severe pHTN [de-identified] : The left ventricle was normal in size. The mid to distal\par  anterior, mid to distal anteroseptal, apical, and inferior\par  walls do not demonstrate significant viability. The\par  septum demonstrates mild viability. The remaining segments\par  are viable (predominantly the lateral wall only).\par  He had a viability study with reported left ventricle was normal in size. The mid to distal anterior, mid to distal anteroseptal, apical, and inferior walls do not demonstrate significant viability. The septum demonstrates mild viability. The remaining segments are viable (predominantly the lateral wall only). Discussed with interventional card Dr. Helms and opted to manage medically. Since there has been a decline in LVEF to 225, it was decided to repeat Cardiac viability to see if there were any areas that could be revascularized. [de-identified] : 9/2022 RHC: RA 15, PCWP 31, PA sat 59%, CO 3.63, CI 1.70, PVR 2.76.\par  9/2022:  s/p R/LHC with IABP support which showed  mid %, LAD 70%, D1 70% and RA 15,\par  PCWP 31, PA sat 59%, CO 3.63, CI 1.70, PVR 2.76; medical management\par  \par  Cardiac Catheterization (05.11.22 @ 15:16) >\par  Intra-aortic Balloon Pump\par  An intra-aortic balloon pump was inserted.\par  Diagnostic Findings:\par  Coronary Angiography\par  The coronary circulation is right dominant.\par  LM\par  Left main artery: Angiography shows no disease.\par  LAD\par  Proximal left anterior descending: There is a 70 % stenosis. First\par  diagonal: There is a 70 % stenosis.\par  Patient: ELYSE MALAVE MRN: [id number]\par  Study Date: 05/11/2022 03:16 PM Page 1 of 4\par  CX\par  Circumflex: Angiography shows no disease.\par  RCA\par  Mid right coronary artery: There is a 100 % stenosis.\par  Exam Start Time: 03:16 PM\par  Exam End Time: 03:53 PM\par  Exam Duration: 37 min\par  Patient: ELYSE MALAVE MRN: [id number]\par  Study Date: 05/11/2022 03:16 PM Page 2 of 4\par  Hemodynamic Pressures:\par  Phase Location [mmHg] [mmHg]\par  [mmHg] HR\par  Baseline LV s 115\par  ed 36 88\par  Baseline Ao s 126 d 85\par  m 102 103\par  Baseline RV s 50\par  ed 17 81\par  Baseline PA s 56 d 31\par  m 41 81\par  Baseline PCW a 32 v 34\par  m 31 77\par  Baseline RA a 20 v 16\par  m 15 95\par  \par   [de-identified] : 1/25/23 doppler of legs with no DVT bilaterally  [de-identified] : PET/CT 1/25/23. Dr. Au reviewed results with patient and his wife notable for hypermetabolic HARRY opacity highly suspicious for malignancy, small left pleural effusion.  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 small left pleural effusion on   1/3/2023- CTA chest showing mildly loculated R pleural effusion and a 1.7cm L upper lobe  pulmonary nodule

## 2024-01-04 NOTE — DISCUSSION/SUMMARY
[Patient] : the patient [EKG obtained to assist in diagnosis and management of assessed problem(s)] : EKG obtained to assist in diagnosis and management of assessed problem(s) [FreeTextEntry2] : wife [FreeTextEntry3] : 4 weeks [FreeTextEntry1] : 1. S/P Acute on chronic systolic HFrEF - will increase bumex 2 mg bid, taking 1 mg since yesterday with goal weight < 175-177 lbs - continue brenda 25 mg po qd. - continue metoprolol 25 mg daily - pt is off losartan 12.5 mg po qd.- will consider restarting - pt is not on isosorbide  - continue daily weight and B/P log - will increase intake of K containing foods - scrotal edema has improved   2. Adenocarcinoma, lung,  - elevated alk phos on labs -  follow up with oncologist - CBC 12/27/23 with H&H 11.2/33.4  Follow up in office in 6-8 weeks, will call with weight and B/P readings

## 2024-01-04 NOTE — HISTORY OF PRESENT ILLNESS
[FreeTextEntry1] : 78-year-old Male with PMH of bipolar disorder, CAD/STEMI (5/2022), severe 3VD, RHC showing elevated filling pressures and PA sat 59%, CO 3.63, CI 1.70, PVR 2.76, S/P left lung lesion S/P RT 3/1/23. Pt is here for a post hospital follow up after admission at [facility] 12/18-12/21/23 for RSV treatment and readmission 12/22/-12/23/23 for shortness of breath.   Course in hospital:  presenting with shortness of breath x few days. Was admitted 12/18 - 12/21/23 for RSV treated solumedrol 40 iv and d/c with prednisone taper, read mitted with c/o having more difficulty breathing, likely 2/2 RSV, r/o PE. Patient was seen by pulmonology. V/Q scan was negative. Patient improved symptomatically, is not requiring oxygen and is now medically optimized for discharge home with home PT and follow up with his PCP and pulmonologist. He will continue his prednisone taper from previous admission.  Currently, he is feeling better. He had labs 12/27/23 notable for Na 130, K 4.2 BUN 60/1.45.  States he is feeling better and has been able to walk room to room in his home and can climb a flight of stairs without dyspnea. He was d/c on bumex 2 mg bid but he decreased to 1 mg qhs last night since weight was 180 lbs.   He is sleeping better with 2 pillows with no orthopnea/PND. He is taking current meds including spironolactone 25 mg daily, metoprolol 25 mg daily and was d/c off losartan 12.5 mg daily with B/P today 103/66. He is not taking isosorbide.   States he no longer has scrotal and leg swelling has improved. He is scheduled for a CT in 12/2023 and then follow up with oncologist 1/2024.    Denies, chest pain, palpitations, dizziness/LH and syncope. He does not have an ICD.

## 2024-01-14 ENCOUNTER — OUTPATIENT (OUTPATIENT)
Dept: OUTPATIENT SERVICES | Facility: HOSPITAL | Age: 79
LOS: 1 days | End: 2024-01-14
Payer: MEDICARE

## 2024-01-14 ENCOUNTER — APPOINTMENT (OUTPATIENT)
Dept: NUCLEAR MEDICINE | Facility: CLINIC | Age: 79
End: 2024-01-14
Payer: MEDICARE

## 2024-01-14 DIAGNOSIS — C34.91 MALIGNANT NEOPLASM OF UNSPECIFIED PART OF RIGHT BRONCHUS OR LUNG: ICD-10-CM

## 2024-01-14 DIAGNOSIS — Z98.890 OTHER SPECIFIED POSTPROCEDURAL STATES: Chronic | ICD-10-CM

## 2024-01-14 PROCEDURE — 78815 PET IMAGE W/CT SKULL-THIGH: CPT

## 2024-01-14 PROCEDURE — 78815 PET IMAGE W/CT SKULL-THIGH: CPT | Mod: 26,PI,MH

## 2024-01-14 PROCEDURE — A9552: CPT

## 2024-01-18 ENCOUNTER — APPOINTMENT (OUTPATIENT)
Dept: OTOLARYNGOLOGY | Facility: CLINIC | Age: 79
End: 2024-01-18

## 2024-01-19 ENCOUNTER — APPOINTMENT (OUTPATIENT)
Dept: RADIATION ONCOLOGY | Facility: CLINIC | Age: 79
End: 2024-01-19
Payer: MEDICARE

## 2024-01-19 PROCEDURE — 99213 OFFICE O/P EST LOW 20 MIN: CPT

## 2024-01-19 NOTE — HISTORY OF PRESENT ILLNESS
[FreeTextEntry1] : Mr. Becerra is a 78  year old man with history of lung cancer. 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 He has extensive cardiac comorbidities and intraoperative course was complicated by cardiogenic shock.  Subsequent imaging detected what appears to be a contralateral new primary lung cancer vs. contralateral metastasis -- 1.7cm apical posterior HARRY nodule detected on CTA 1/3/23, confirmed to be hypermetabolic on PET CT of 1/25/23. No pathologic diagnosis of the HARRY nodule has been made. At time of presentation he is physically asymptomatic apart from OGLESBY, although anxious about his diagnosis. He has been deemed a poor candidate for further surgery.  Referred for consideration of definitive radiation therapy for new clinical stage I primary cancer of the left lung vs. small contralateral oligometastasis. Patient received 50 Gy in 5 fractions to the left lung from 2/17/2023- 3/1/2023.  5/3/2023: Patient presents to clinic for PTE s/p 50 Gy in 5 fractions to the left lung from 2/17/2023- 3/1/2023, patient tolerated treatments well. Patient had CT-Chest performed 5/1/2023. Report pending. Patient reports doing well, OGLESBY and SOB much improved, back to swimming 25 min per day. States been able to mostly resume all pre diagnosis activities. Denies dysphasia, dyspepsia or esophagitis symptoms.   8/17/2023: Patient presents for follow up. PET-CT performed 8/8/2023 Impression: Redemonstration of a left upper lobe lobulated lung nodule that is stable in size (anatomically) and has slightly decreased metabolically (FDG uptake). Continued follow-up as clinically indicated.  He is doing well.  Denies new c/o.  10/13/2023: Patient presents for telephonic follow-up. He was recently admitted in [place] for pneumonia/effusion.  On 9/27/2023 a CT-Chest at that time showed left upper lung density measuring 3.2cm.  Right pleural effusion is noted with HARRY lesion of questionable etiology and significance. No gross focal infiltrate identified. Short term (3 month) follow-up CT scan or PET scan may prove useful as clinically indicated.  He was then admitted and back in  and recently seen by cardiology, a recent echo demonstrating an ejection fraction in the 15% range. Referred for consideration of ICD placement.  He was hospitalized in [facility] for heart failure and fluid retention for 5 days. Denies any new symptoms and feels well.  1/19/2024: Patient presents via telehealth for follow-up. He had a had hospitalization for SOB/RSV, 12/18/23 and discharged on 12/22/23 CT-Chest performed 12/18/2023: Impression: 1. Post right upper lobe lobectomy. No evidence of local disease recurrence. 2. Findings compatible with small large airways inflammation more pronounced when compared to prior imaging. 3. A nodular lesion within the apical posterior segment of the left upper lobe which appears more nodular configuration measuring 32 x 20 mm (333:142). Findings concerning for left upper lobe neoplasm and correlation with whole-body PET/CT is suggested. 4. Bibasilar pleural effusions which have decreased slightly in extent on the right side. 5. Mediastinal as well as paraesophageal lymphadenopath slightly more pronounced. 6. New groundglass opacity within the superior aspect of the residual right middle lobe. Short interval surveillance in 3-6 months is suggested.    PET-CT performed on 1/14/2024 IMPRESSION: 1. Nodular component of the left upper lobe apical segment changes, similar to the most recent CT chest, has increased in size since the prior PET/CT in 8/2023 with persistent increased activity. Increased left pleural effusion since the most recent CT chest. Left upper lobe nodular opacity is suspicious for malignant process; can be percutaneously biopsied medial to the left scapula under CT guidance. 2. Stable postsurgical changes in the right upper lung field with no abnormal activity; right middle lobe subpleural groundglass opacities with patchy activity may reflect inflammatory processes. Reassess on follow-up. 3. No suspicious FDG avid malignant lesion in the remaining field of view.  Today he denies worsening SOB or pain.

## 2024-01-19 NOTE — REASON FOR VISIT
[Routine Follow-Up] : routine follow-up visit for [Lung Cancer] : lung cancer [Home] : at home, [unfilled] , at the time of the visit. [Medical Office: ([facility])___] : at the medical office located in  [Spouse] : spouse [Patient] : the patient [Self] : self [This encounter was initiated by telehealth (audio with video) and converted to telephone (audio only) due to technical difficulties.] : This encounter was initiated by telehealth (audio with video) and converted to telephone (audio only) due to technical difficulties. [FreeTextEntry4] : Brenda Bermgan RN

## 2024-01-19 NOTE — REVIEW OF SYSTEMS
[Chest Pain] : no chest pain [Shortness Of Breath] : no shortness of breath [Wheezing] : no wheezing [Cough] : no cough [SOB on Exertion] : shortness of breath during exertion [Negative] : Heme/Lymph [Dyspepsia: Grade 0] : Dyspepsia: Grade 0 [Dysphagia: Grade 0] : Dysphagia: Grade 0 [Esophagitis: Grade 0] : Esophagitis: Grade 0 [Nausea: Grade 0] : Nausea: Grade 0 [Vomiting: Grade 0] : Vomiting: Grade 0 [Cognitive Disturbance: Grade 0] : Cognitive Disturbance: Grade 0 [Dizziness: Grade 0] : Dizziness: Grade 0  [Headache: Grade 0] : Headache: Grade 0 [Skin Hyperpigmentation: Grade 0] : Skin Hyperpigmentation: Grade 0 [Dermatitis Radiation: Grade 0] : Dermatitis Radiation: Grade 0 [Fatigue: Grade 1 - Fatigue relieved by rest] : Fatigue: Grade 1 - Fatigue relieved by rest [Depression: Grade 1 - Mild depressive symptoms] : Depression: Grade 1 - Mild depressive symptoms [Cough: Grade 0] : Cough: Grade 0 [Dyspnea: Grade 2 - Shortness of breath with minimal exertion; limiting instrumental ADL] : Dyspnea: Grade 2 - Shortness of breath with minimal exertion; limiting instrumental ADL [Hoarseness: Grade 1 - Mild or intermittent voice change; fully understandable; self-resolves] : Hoarseness: Grade 1 - Mild or intermittent voice change; fully understandable; self-resolves [FreeTextEntry2] : intermittent increases of OGLESBY

## 2024-01-22 ENCOUNTER — APPOINTMENT (OUTPATIENT)
Dept: HEMATOLOGY ONCOLOGY | Facility: CLINIC | Age: 79
End: 2024-01-22
Payer: MEDICARE

## 2024-01-22 ENCOUNTER — NON-APPOINTMENT (OUTPATIENT)
Age: 79
End: 2024-01-22

## 2024-01-22 PROCEDURE — 99213 OFFICE O/P EST LOW 20 MIN: CPT

## 2024-01-22 NOTE — PHYSICAL EXAM
[Fully active, able to carry on all pre-disease performance without restriction] : Status 0 - Fully active, able to carry on all pre-disease performance without restriction [Normal] : affect appropriate [de-identified] : breathing appeared unlabored [de-identified] : coloration appeared normal

## 2024-01-22 NOTE — ASSESSMENT
[FreeTextEntry1] : Lab work, PET/CT scan report, 1/19/2024 radiation oncology note reviewed. #1) 9/2022-Stage IB (pT2aN0) adenocarcinoma of the lung, moderately differentiated-status post right upper lobectomy/lymph node sampling. Intraoperative course complicated by cardiogenic shock. PD-L1 () TPS 0%. ALK negative. EGFR mutation analysis-+ EGFR mutation ([id number], CTG>CGG).  Per NCCN guidelines, may consider observation with expectant surveillance or chemotherapy for high risk patients and osimertinib (if EGFR exon 19 deletion or L858R). Examples of high risk features may include poorly differentiated tumors, vascular invasion, wedge resection, tumors greater than 4 cm, visceral pleural involvement or unknown lymph node status-these features do not apply in this case-in light of this and patient's comorbidities, favored expectant surveillance approach. Had discussed the EGFR mutation with potential implications in the future with treatment available if needed.  1/3/2023-CT angiogram of the chest-negative for PE. Mildly increased moderate loculated right pleural effusion since 12/27/2022. Indeterminant 1.7 cm apical posterior left upper lobe nodule, enlarged since 7/2022 (4 mm). 1/9/2023-S/P Right thoracentesis, 850 ml fluid was drained. Path revealed negative for malignant cells.  1/25/2023-PET/CT scan-hypermetabolic left upper lobe opacity highly suspicious for malignancy. Small left pleural effusion. No hypermetabolic hilar or mediastinal nodes. Postsurgical changes in the right upper lung field. No suspicious FDG avid lesion at the surgical site. Right pleural effusion slightly decreased from the recent CT chest. Contiguous foci of activity localizing to the vertebral ends of the left 10th-12th ribs with a new fracture in the 11th rib since the recent CT angiogram of the chest. Pattern of abnormalities compatible with traumatic injury, however, in the presence of a hypermetabolic left upper lobe mass, need to follow closely.  So, had enlarging left lung lesion suggestive of neoplastic disease-?second primary vs. met. lesion. Patient saw thoracic surgery and was deemed not to be a surgical candidate in light of comorbidities. He was referred to radiation oncology for radiation therapy-completed 3/1/2023-5 Fx/ 5000 cGy. Treatment Site: Left Lung. Had discussed potential role for systemic therapy-note +EGFR mutation with right-sided tumor. However, F/U liquid biopsy at this time -1/31/2023-no therapies associated with the genomic findings in this sample. KATHY, 3 Muts/Mb.  5/1/2023-CT chest-status post right upper lobectomy. No significant change in the 1.6 cm lobulated nodule in the left upper lobe when compared to previous exam. Once again, exact etiology is unclear. Patient with multiple comorbidities, so expectant surveillance.  8/8/2023-PET/CT scan-redemonstration of a left upper lobe lobulated lung nodule that is stable in size (anatomically) and has slightly decreased metabolically (FDG uptake). Continued follow-up as clinically indicated. 12/18/2023-CT chest-1. Post right upper lobe lobectomy. No evidence of local disease recurrence. 2. Findings compatible with small large airways inflammation more pronounced when compared to prior imaging. 3. A nodular lesion within the apical posterior segment of the left upper lobe which appears more nodular configuration measuring 32 x 20 mm (333:142). Findings concerning for left upper lobe neoplasm and correlation with whole-body PET/CT is suggested. 4. Bibasilar pleural effusions which have decreased slightly in extent on the right side. 5. Mediastinal as well as paraesophageal lymphadenopath slightly more pronounced. 6. New groundglass opacity within the superior aspect of the residual right middle lobe. Short interval surveillance in 3-6 months is suggested. 1/14/2024-PET/CT scan-  1. Nodular component of the left upper lobe apical segment changes, similar to the most recent CT chest, has increased in size since the prior PET/CT in 8/2023 with persistent increased activity. Increased left pleural effusion since the most recent CT chest. Left upper lobe nodular opacity is suspicious for malignant process; can be percutaneously biopsied medial to the left scapula under CT guidance. 2. Stable postsurgical changes in the right upper lung field with no abnormal activity; right middle lobe subpleural groundglass opacities with patchy activity may reflect inflammatory processes. Reassess on follow-up. 3. No suspicious FDG avid malignant lesion in the remaining field of view. --discussed scan results and management options.  To consider biopsy of increasing lesion.  However, radiation oncology input noted/appreciated-may have postradiation changes still, and short interval repeat CT scan of the chest to be done-patient/wife wish for this approach.  #2) h/o anemia/thrombocytopenia. h/o Intermittent mild epistaxis and renal insufficiency suspect contribute to the anemia. Follow CBC.  --Should hematologic scenario worsen, can consider further evaluation to r/o evolving BM disorder, should patient consent.  #3) F/U with cardiologist for elevated creatinine/BP med adjustment as needed; f/u with PCP for primary care issues.  #4) elevated LFT's-liver on PET/CT scan unremarkable.?  Medication related.  Patient stated he will call PCP today for recommendations regarding this issue.  Patient was given the opportunity to ask questions. His questions have been answered to his apparent satisfaction at this time.  -->RTO 12 weeks.

## 2024-01-22 NOTE — HISTORY OF PRESENT ILLNESS
[Disease: _____________________] : Disease: [unfilled] [T: ___] : T[unfilled] [N: ___] : N[unfilled] [AJCC Stage: ____] : AJCC Stage: [unfilled] [de-identified] : 9/2022-PD-L1 () TPS 0%. ALK negative.  EGFR mutation analysis-+ EGFR mutation ([id number], CTG>CGG).\par  1/31/2023-Liquid biopsy-no therapies associated with the genomic findings in this sample. KATHY, 3 Muts/Mb.  [de-identified] : Moderately differentiated invasive acinar adenocarcinoma [de-identified] : 78 year old male, never smoker, w/ hx of bipolar, BPH, SCC of leg, CHF, CAD, hospitalized on May 11-17/2022 for acute MI s/p cardiac Cath with no intervention (LHC showed 100% occlusion for right coronary artery and 70% occlusion of left anterior descending artery. The arteries were not intervened upon due to viability study showing no viability in the areas supplied by the occluded arteries), cardiogenic shock, found to have new 1x 1.3cm LV thrombus on Plavix and Coumadin (Coumadin completed on 08/26/2022), who f/u with cardiologist and c/o SOB, CXR showed RUL nodule.  7/11/2022-CT chest-3.5 cm part solid mass is noted in the right upper lobe as described above. Primary consideration is lung carcinoma.  7/28/2022-PET/CT scan-FDG avid partly solid right upper lung nodule suspicious for malignancy. Small bilateral pleural effusion with minimal FDG avidity, nonspecific. Nonspecific mildly FDG avid low-attenuation left adrenal nodule, possibly an adenoma. Heterogeneous FDG activity in the soft tissue associated with surgical clips overlying the symphysis pubis, probably related from prior procedure.  9/14/20228338-SR-wuxhdn biopsy of right upper lung mass-positive for malignant cells, adenocarcinoma. PD-L1 () TPS 0%. ALK negative. EGFR mutation analysis-+ EGFR mutation ([id number], CTG>CGG).  9/23/2022-Status post right VATS-right upper lobectomy-pathology revealed adenocarcinoma, acinar predominant, margin negative. One level 10 lymph node excised and 5 interlobar lymph nodes negative for carcinoma. Right pleural fluid negative for malignant cells. Intraoperative course complicated by cardiogenic shock requiring inotropic support and diuretics.  10/21/2020 2-2 cm lobulated opacity in the left upper lobe, new compared to prior exam.  1/3/2023-CT angiogram of the chest-negative for PE. Mildly increased moderate loculated right pleural effusion since 12/27/2022. Indeterminant 1.7 cm apical posterior left upper lobe nodule, enlarged since 7/2022 (4 mm).  1/25/2023-PET/CT scan-hypermetabolic left upper lobe opacity highly suspicious for malignancy. Small left pleural effusion. No hypermetabolic hilar or mediastinal nodes. Postsurgical changes in the right upper lung field. No suspicious FDG avid lesion at the surgical site. Right pleural effusion slightly decreased from the recent CT chest. Contiguous foci of activity localizing to the vertebral ends of the left 10th-12th ribs with a new fracture in the 11th rib since the recent CT angiogram of the chest. Pattern of abnormalities compatible with traumatic injury, however, in the presence of a hypermetabolic left upper lobe mass, need to follow closely.  1/9/2023-S/P Right thoracentesis, 850 ml fluid was drained. Path revealed negative for malignant cells.  3/1/2023-Completed radiation therapy. Planned Dose: 5 Fx/ 5000 cGy. Treatment Site: Left Lung.  8/8/2023-PET/CT scan-Redemonstration of a left upper lobe lobulated lung nodule that is stable in size (anatomically) and has slightly decreased metabolically (FDG uptake). Continued follow-up as clinically indicated.  12/18/2023-CT chest-1. Post right upper lobe lobectomy. No evidence of local disease recurrence. 2. Findings compatible with small large airways inflammation more pronounced when compared to prior imaging. 3. A nodular lesion within the apical posterior segment of the left upper lobe which appears more nodular configuration measuring 32 x 20 mm (333:142). Findings concerning for left upper lobe neoplasm and correlation with whole-body PET/CT is suggested. 4. Bibasilar pleural effusions which have decreased slightly in extent on the right side. 5. Mediastinal as well as paraesophageal lymphadenopath slightly more pronounced. 6. New groundglass opacity within the superior aspect of the residual right middle lobe. Short interval surveillance in 3-6 months is suggested.  1/14/2024-PET/CT scan-  1. Nodular component of the left upper lobe apical segment changes, similar to the most recent CT chest, has increased in size since the prior PET/CT in 8/2023 with persistent increased activity. Increased left pleural effusion since the most recent CT chest. Left upper lobe nodular opacity is suspicious for malignant process; can be percutaneously biopsied medial to the left scapula under CT guidance. 2. Stable postsurgical changes in the right upper lung field with no abnormal activity; right middle lobe subpleural groundglass opacities with patchy activity may reflect inflammatory processes. Reassess on follow-up. 3. No suspicious FDG avid malignant lesion in the remaining field of view.   [de-identified] : Disease: Right upper lung   Pathology: Moderately differentiated invasive acinar adenocarcinoma   TNM stage: T2a (3.5 cm), N0 AJCC Stage: IB  9/2022-PD-L1 () TPS 0%. ALK negative. EGFR mutation analysis-+ EGFR mutation ([id number], CTG>CGG). 1/31/2023-Liquid biopsy-no therapies associated with the genomic findings in this sample. KATHY, 3 Muts/Mb. [de-identified] : Had PET scan 1/14/24. Saw radiation oncology, and plans f/u CT chest in 3 months. Going to [place] until the end of February. No current complaints of chest pain; no hemoptysis; no fevers.  No complaints of bone pain.  No GI complaints. Remains on Plavix.  Sees Dr. Yu regularly (Psych). Has anxiety, insomnia.

## 2024-01-28 ENCOUNTER — NON-APPOINTMENT (OUTPATIENT)
Age: 79
End: 2024-01-28

## 2024-02-14 ENCOUNTER — NON-APPOINTMENT (OUTPATIENT)
Age: 79
End: 2024-02-14

## 2024-03-07 ENCOUNTER — NON-APPOINTMENT (OUTPATIENT)
Age: 79
End: 2024-03-07

## 2024-03-07 ENCOUNTER — APPOINTMENT (OUTPATIENT)
Dept: HEART FAILURE | Facility: CLINIC | Age: 79
End: 2024-03-07
Payer: MEDICARE

## 2024-03-07 ENCOUNTER — APPOINTMENT (OUTPATIENT)
Dept: FAMILY MEDICINE | Facility: CLINIC | Age: 79
End: 2024-03-07
Payer: MEDICARE

## 2024-03-07 VITALS — HEART RATE: 73 BPM | OXYGEN SATURATION: 100 % | SYSTOLIC BLOOD PRESSURE: 83 MMHG | DIASTOLIC BLOOD PRESSURE: 57 MMHG

## 2024-03-07 VITALS
SYSTOLIC BLOOD PRESSURE: 112 MMHG | RESPIRATION RATE: 20 BRPM | BODY MASS INDEX: 24.5 KG/M2 | HEART RATE: 76 BPM | DIASTOLIC BLOOD PRESSURE: 60 MMHG | WEIGHT: 175 LBS | OXYGEN SATURATION: 97 % | HEIGHT: 71 IN

## 2024-03-07 VITALS — WEIGHT: 177 LBS | BODY MASS INDEX: 24.78 KG/M2 | HEIGHT: 71 IN

## 2024-03-07 DIAGNOSIS — R73.01 IMPAIRED FASTING GLUCOSE: ICD-10-CM

## 2024-03-07 DIAGNOSIS — Z86.39 PERSONAL HISTORY OF OTHER ENDOCRINE, NUTRITIONAL AND METABOLIC DISEASE: ICD-10-CM

## 2024-03-07 DIAGNOSIS — E78.5 HYPERLIPIDEMIA, UNSPECIFIED: ICD-10-CM

## 2024-03-07 PROCEDURE — 93000 ELECTROCARDIOGRAM COMPLETE: CPT

## 2024-03-07 PROCEDURE — 99214 OFFICE O/P EST MOD 30 MIN: CPT

## 2024-03-07 PROCEDURE — 36415 COLL VENOUS BLD VENIPUNCTURE: CPT

## 2024-03-07 RX ORDER — EMPAGLIFLOZIN 10 MG/1
10 TABLET, FILM COATED ORAL
Qty: 30 | Refills: 0 | Status: DISCONTINUED | COMMUNITY
Start: 2024-03-07 | End: 2024-03-07

## 2024-03-07 RX ORDER — FLUOXETINE HYDROCHLORIDE 20 MG/1
20 CAPSULE ORAL
Qty: 45 | Refills: 0 | Status: ACTIVE | COMMUNITY
Start: 2023-12-22

## 2024-03-07 RX ORDER — FERROUS SULFATE 137(45) MG
142 (45 FE) TABLET, EXTENDED RELEASE ORAL
Refills: 0 | Status: DISCONTINUED | COMMUNITY
Start: 2023-11-02 | End: 2024-03-07

## 2024-03-07 RX ORDER — FLUOXETINE HYDROCHLORIDE 40 MG/1
40 CAPSULE ORAL EVERY OTHER DAY
Qty: 45 | Refills: 0 | Status: DISCONTINUED | COMMUNITY
Start: 2023-12-22 | End: 2024-03-07

## 2024-03-07 NOTE — ASSESSMENT
[FreeTextEntry1] : No clinical evidence of decompensation at this encounter Lab profiles drawn in office and sent To F/U with Cardiology later today

## 2024-03-07 NOTE — DISCUSSION/SUMMARY
[Patient] : the patient [FreeTextEntry2] : wife [FreeTextEntry3] : 4 weeks [FreeTextEntry1] : 1. Chronic systolic HFrEF - will continue bumex 2 mg bid, goal weight < 175-177 lbs and he is 168 lbs today - continue brenda 12.5 mg po qd. - continue metoprolol 25 mg daily- may take qhs or take 12.5 mg bid if B/P low - pt is off losartan 12.5 mg po qd.- will not restart - pt is not on isosorbide  - continue daily weight and B/P log - S/P thoracentesis with 1 liter removed bilaterally - no further scrotal edema  - pt is deferring ICD for now - will call with labs  2. Adenocarcinoma, lung,  -  follow up with oncologist  Follow up in office in 6-8 weeks, will call  [EKG obtained to assist in diagnosis and management of assessed problem(s)] : EKG obtained to assist in diagnosis and management of assessed problem(s)

## 2024-03-07 NOTE — PHYSICAL EXAM
[No Acute Distress] : no acute distress [No Carotid Bruit] : no carotid bruit [No Gallop] : no gallop [Normal S1, S2] : normal S1, S2 [Good Air Entry] : good air entry [Clear Lung Fields] : clear lung fields [Soft] : abdomen soft [Non Tender] : non-tender [No Respiratory Distress] : no respiratory distress  [Moves all extremities] : moves all extremities [No Rash] : no rash [Alert and Oriented] : alert and oriented [de-identified] : Elderly male [de-identified] : RRR [de-identified] : JVP 8 cm  [de-identified] : slow gait [de-identified] : Decreased at bases but clear with no wheezing [de-identified] : trace edema of lower extremities

## 2024-03-07 NOTE — CARDIOLOGY SUMMARY
[de-identified] : 3/7/24 NSR with 1 st degree AVB 73, LAFB, NSST 1/4/24 NSR with 1 st degree AVB 61, LAFB, NSST 11/16/23 NSR 61 with 1 st degree AVB 61. LAFB, PRWP, NSST 11/2/23 NSR 68 with 1 st degree AVB 61. LAFB, PRWP, NSST 10/23/23 NSR 68 with 1 st degree AVB 63. LAFB, PRWP, NSST 10/16/23  NSR 74 with 1 st degree AVB 63. LAFB, PRWP, NSST 8/10/23 NSR 67 with 1 st degree AVB 63. LAFB, PRWP, NSST 5/4/23 NSR 67 with 1 st degree AVB 63. LAFB, PRWP, NSST 3/6/23 NSR with 1 st degree AVB 63. LAFB, PRWP, NSST 1/26/23 NSR with 1 st degree AVB 74. LAFB, PRWP, NSST 11/28/22 NSR with 1 st degree AVB 70. LAFB, PRWP, NSST 10/27/22  NSR 71,  LAFB, PRWP, NSST  10/13/22 NSR 98, LAFB, PRWP, NSST with PVC's [de-identified] : 7/31/23 TTE with LVEF 23%, LVIDD 6 cm, mod severe MR, mild diastolic dysfunction, severe LV systolic dysfunction, no LV thrombus, decreased RV systolic function  TTE 1/4/23 with LVEF 27%, LVIDD 6.5 cm , no LV thrombus, severe LV systolic dysfunction, decreased RV systolic dysfunction, severe MR, mild TR.    11/28/22 TTE; LVEF 22%, LVIDD 6.1 cm, mod MR, severe global LV systolic dysfunction, moderate diastolic dysfunction Stage II, with decreased RV systolic function, severe pHTN  9/24/22 TTE; LVEF 34%, LVIDD 6.1 cm, mod MR, mild AR, normal LA, severe global LV systolic dysfunction, moderate diastolic dysfunction Stage II, normal RV systolic function. mod TR, RV systolic function 82 mmHg c/q severe pHTN [de-identified] : The left ventricle was normal in size. The mid to distal\par  anterior, mid to distal anteroseptal, apical, and inferior\par  walls do not demonstrate significant viability. The\par  septum demonstrates mild viability. The remaining segments\par  are viable (predominantly the lateral wall only).\par  He had a viability study with reported left ventricle was normal in size. The mid to distal anterior, mid to distal anteroseptal, apical, and inferior walls do not demonstrate significant viability. The septum demonstrates mild viability. The remaining segments are viable (predominantly the lateral wall only). Discussed with interventional card Dr. Helms and opted to manage medically. Since there has been a decline in LVEF to 225, it was decided to repeat Cardiac viability to see if there were any areas that could be revascularized. [de-identified] : 1/25/23 doppler of legs with no DVT bilaterally  [de-identified] : 9/2022 RHC: RA 15, PCWP 31, PA sat 59%, CO 3.63, CI 1.70, PVR 2.76.\par  9/2022:  s/p R/LHC with IABP support which showed  mid %, LAD 70%, D1 70% and RA 15,\par  PCWP 31, PA sat 59%, CO 3.63, CI 1.70, PVR 2.76; medical management\par  \par  Cardiac Catheterization (05.11.22 @ 15:16) >\par  Intra-aortic Balloon Pump\par  An intra-aortic balloon pump was inserted.\par  Diagnostic Findings:\par  Coronary Angiography\par  The coronary circulation is right dominant.\par  LM\par  Left main artery: Angiography shows no disease.\par  LAD\par  Proximal left anterior descending: There is a 70 % stenosis. First\par  diagonal: There is a 70 % stenosis.\par  Patient: ELYSE MALAVE MRN: [id number]\par  Study Date: 05/11/2022 03:16 PM Page 1 of 4\par  CX\par  Circumflex: Angiography shows no disease.\par  RCA\par  Mid right coronary artery: There is a 100 % stenosis.\par  Exam Start Time: 03:16 PM\par  Exam End Time: 03:53 PM\par  Exam Duration: 37 min\par  Patient: ELYSE MALAVE MRN: [id number]\par  Study Date: 05/11/2022 03:16 PM Page 2 of 4\par  Hemodynamic Pressures:\par  Phase Location [mmHg] [mmHg]\par  [mmHg] HR\par  Baseline LV s 115\par  ed 36 88\par  Baseline Ao s 126 d 85\par  m 102 103\par  Baseline RV s 50\par  ed 17 81\par  Baseline PA s 56 d 31\par  m 41 81\par  Baseline PCW a 32 v 34\par  m 31 77\par  Baseline RA a 20 v 16\par  m 15 95\par  \par   [de-identified] : PET/CT 1/25/23. Dr. Au reviewed results with patient and his wife notable for hypermetabolic HARRY opacity highly suspicious for malignancy, small left pleural effusion.  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 small left pleural effusion on   1/3/2023- CTA chest showing mildly loculated R pleural effusion and a 1.7cm L upper lobe  pulmonary nodule

## 2024-03-07 NOTE — ASSESSMENT
[FreeTextEntry1] : 78-year-old Male with PMH of bipolar disorder, CAD/STEMI (5/2022), severe 3VD, RHC showing elevated filling pressures and PA sat 59%, CO 3.63, CI 1.70, PVR 2.76, S/P left lung lesion S/P RT 3/1/23. Last admissions at [facility] 12/18-12/21/23 for RSV treatment and readmission 12/22/-12/23/23 for shortness of breath and in [place] 2/2024 for ADHF S/P thoracentesis. Pt is here for a follow-up today.  Adenocarcinoma, lung, right (162.9) (C34.91) Edema (782.3) (R60.9) Acute on chronic systolic congestive heart failure (428.23,428.0) (I50.23) Severe ischemic CMP Stage C HF, NYHA class 3 Lung ca CKD  Appears compensated and with B/P 83/57 with no dizziness and home B/P range 100

## 2024-03-07 NOTE — HISTORY OF PRESENT ILLNESS
[FreeTextEntry1] : 78-year-old Male with PMH of bipolar disorder, CAD/STEMI (5/2022), severe 3VD, RHC showing elevated filling pressures and PA sat 59%, CO 3.63, CI 1.70, PVR 2.76, S/P left lung lesion S/P RT 3/1/23. Last admissions at [facility] 12/18-12/21/23 for RSV treatment and readmission 12/22/-12/23/23 for shortness of breath and in [place] 2/2024 for ADHF S/P thoracentesis. Pt is here for a follow-up today.  Pt is here for a follow up after recent admission in hospital in [place] for CHF exacerbation with pleural effusions requiring thoracentesis with 1 liter removed bilaterally with improvement in his symptoms.   Currently, he is feeling better. Went over his meds and he was restarted on brenda 12.5 mg daily and Jardiance 5 mg daily on d/c. He was taking bumex 4 mg bid but decreased to 3 mg bid and then 2 mg bid yesterday as weight has decreased to 168 lbs. Home B/P ranage 100-110. He had labs with PCP today. He brought in records from hospitalization (see scanned data).  States he is feeling better and can walk for 20 minutes in his home without fatigue. He is able to walk outside for approx 1 block and can climb a flight of stairs without dyspnea. Adhering to low salt diet and is drinking < 1. liters of fluid. He is on lower doses of Prozac 20 mg and is taking q 3 days and feels less sleepy on lower doses.   He is sleeping better with 2 pillows with no orthopnea/PND.   States he no longer has scrotal and leg swelling has improved. He is scheduled for a follow up with oncologist.    Denies, chest pain, palpitations, dizziness/LH and syncope. He does not have an ICD.

## 2024-03-07 NOTE — PHYSICAL EXAM
[No Acute Distress] : no acute distress [No Edema] : there was no peripheral edema [Soft] : abdomen soft [Normal] : normal rate, regular rhythm, normal S1 and S2 and no murmur heard [Normal Posterior Cervical Nodes] : no posterior cervical lymphadenopathy [Non Tender] : non-tender [Normal Anterior Cervical Nodes] : no anterior cervical lymphadenopathy [No Focal Deficits] : no focal deficits [Alert and Oriented x3] : oriented to person, place, and time

## 2024-03-07 NOTE — HISTORY OF PRESENT ILLNESS
[de-identified] : Presents with wife for BP check, labs, and general follow-up.  Note was in hospital in [place] for CHF exacerbation with pleural effusions requiring thoracentesis - feeling much improved.

## 2024-03-08 LAB
ALBUMIN SERPL ELPH-MCNC: 4 G/DL
ALP BLD-CCNC: 415 U/L
ALT SERPL-CCNC: 29 U/L
ANION GAP SERPL CALC-SCNC: 12 MMOL/L
AST SERPL-CCNC: 41 U/L
BILIRUB SERPL-MCNC: 1.5 MG/DL
BUN SERPL-MCNC: 67 MG/DL
CALCIUM SERPL-MCNC: 9.4 MG/DL
CHLORIDE SERPL-SCNC: 96 MMOL/L
CHOLEST SERPL-MCNC: 163 MG/DL
CO2 SERPL-SCNC: 28 MMOL/L
CREAT SERPL-MCNC: 1.87 MG/DL
EGFR: 36 ML/MIN/1.73M2
ESTIMATED AVERAGE GLUCOSE: 134 MG/DL
GGT SERPL-CCNC: 982 U/L
GLUCOSE SERPL-MCNC: 97 MG/DL
HBA1C MFR BLD HPLC: 6.3 %
HCT VFR BLD CALC: 41.9 %
HDLC SERPL-MCNC: 96 MG/DL
HGB BLD-MCNC: 13.9 G/DL
LDLC SERPL CALC-MCNC: 56 MG/DL
MCHC RBC-ENTMCNC: 31.7 PG
MCHC RBC-ENTMCNC: 33.2 GM/DL
MCV RBC AUTO: 95.4 FL
NONHDLC SERPL-MCNC: 67 MG/DL
PLATELET # BLD AUTO: 176 K/UL
POTASSIUM SERPL-SCNC: 4.3 MMOL/L
PROT SERPL-MCNC: 7.3 G/DL
RBC # BLD: 4.39 M/UL
RBC # FLD: 15 %
SODIUM SERPL-SCNC: 136 MMOL/L
TRIGL SERPL-MCNC: 52 MG/DL
WBC # FLD AUTO: 4.75 K/UL

## 2024-03-18 NOTE — DISCHARGE NOTE PROVIDER - NSRESEARCHGRANT_HIDDEN_GEN_A_CORE
Neurology Clinic Follow up Visit Note:    Interim History:  No seizures since last visit, no other issues or concerns    Previous History:  7/17/2023  No seizures for over a year, and no side effects and no other concerns        6/21/2022  No seizures since the last visit, no side effects on Keppra and no other concerns        12/21/2021  No seizures since the September 1st, 2021. No side effects on Keppra. Compliant and no issues. Improving left arm strength     HPI: 9/14/2021  Patient is a 22 year old male patient, who just had a seizure recently ( about a week ago). Patient was talking to his mother, when he started to have GTC like activity, with up rolling of his eyes, and he also bit his tongue. The bite was on the right side. He did fall to the ground as well. The seizure lasted for about 2-3 minutes, and then he was confused, not able to talk for about 30 minutes.  Patient does have history of AVM that ruptured and was repaired in June 2020             PAST MEDICAL, SURGICAL, FAMILY AND SOCIAL HISTORY:  Patient Active Problem List   Diagnosis    Intracranial hemorrhage, spontaneous intraparenchymal, due to cerebral AVM, acute (CMD)    Arteriovenous malformation, cerebral    Acute respiratory failure (CMD)    Impaired mobility and activities of daily living    Oropharyngeal dysphagia    DVT prophylaxis    Acute post-operative pain    Left hemiparesis (CMD)    Cognitive deficit as late effect of cerebral aneurysm    Gait instability    Oral thrush    Headache, unspecified headache type     Past Surgical History:   Procedure Laterality Date    Brain surgery       Family History   Problem Relation Age of Onset    Myocardial Infarction Maternal Grandfather     Stroke Neg Hx     Aneurysm Neg Hx      Social History     Socioeconomic History    Marital status: Single     Spouse name: Not on file    Number of children: Not on file    Years of education: Not on file    Highest education level: Not on file    Occupational History    Not on file   Tobacco Use    Smoking status: Never    Smokeless tobacco: Never   Vaping Use    Vaping Use: never used   Substance and Sexual Activity    Alcohol use: Yes     Comment: Drinks socially    Drug use: Yes     Types: Marijuana     Comment: occasionally    Sexual activity: Not on file   Other Topics Concern    Not on file   Social History Narrative    Not on file     Social Determinants of Health     Financial Resource Strain: Not on file   Food Insecurity: Not on file   Transportation Needs: Not on file   Physical Activity: Not on file   Stress: Not on file   Social Connections: Not on file   Interpersonal Safety: Not on file (6/13/2020)       ALLERGIES:  No Known Allergies    MEDICATIONS:  .  Current Outpatient Medications   Medication Sig Dispense Refill    levETIRAcetam (KepPRA) 500 MG tablet Take 1 tablet by mouth in the morning and 1 tablet in the evening. 180 tablet 3     No current facility-administered medications for this visit.       (per patient report or based on medication list in EPIC)    REVIEW OF SYSTEMS:  (except as listed above in HPI)  General:  No fevers, chills, excessive fatigue, loss of appetite or unexpected weight gain  Eyes:  No eye pain, vision change or vision loss   ENT: No hearing loss, ear pain, hoarse throat/voice or nasal congestion  Cardiovascular: No chest pain, palpitations/irregular heart beat, lightheadedness or edema in LEs  Respiratory: No shortness of breath, wheezing, snoring or coughing up blood  Gastrointestinal:  No blood in stools, abdominal pain or unexplained nausea/vomiting  Genitourinary: No blood in urine, pain with urination or inability to control urine  Endocrine:  No heat/cold intolerance, excessive thirst or appetite  Skin: no new rashes or change in mole  Hematologic:  No easy bruising, bleeding or on 'blood thinners'  Musculoskeletal: No joint pain, joint swelling or muscle aches  Neurologic: per HPI  Psychiatric:  No  anxiety, depression, insomnia or suicidal thoughts      PHYSICAL EXAM:  Vitals: Blood pressure 128/68, pulse (!) 118, height 5' 7\" (1.702 m), weight 90.4 kg (199 lb 4.7 oz), SpO2 98 %.  Gen: No acute distress. Is pleasant and interactive.  Head:  Normocephalic and atraumatic.    ENT:  Normal appearing nose and ears, no exudates  Heart:  Normal rate and regular rhythm.  Lungs:  Normal respiratory rate and effort.  No wheeze.  Extremities:  No edema in lower extremities. No discoloration or deformities.  Skin: No obvious rashes noted on body. Skin warm and dry.  Musculoskeletal:  Good ROM in neck and back without any tenderness or spasm.  Psych:  Affect was appropriate to situation and mood was normal.  Neurologic:  Mental status:  Awake and alert.   Oriented to person and place.  Speech is fluent and patient is able to provide adequate history  Good insight into reason for visit and medical condition.  Cranial nerves:   Pupils are equal and briskly reactive to light.  Extraocular muscles are intact.  Fundi are benign. Normal visual fields to confrontation.  Facial sensation to pin is symmetric V1-V3 bilaterally. No facial asymmetry is present.  Hearing is grossly intact.  Soft palate elevates equally. Tongue is midline.  Shoulder shrug is symmetric.  Cerebellar exam:  FNFand rapid alternating movements are intact.  Tandem gait is normal.  Romberg steady EO and EC.  Motor exam:  Normal gait with abililty to walk unassisted.  Able to walk on heels and toes without any difficulties. Strength in all 4 extremities is 5/5.  Reflex examination was 2/4 in upper and lower extremities.  Plantar response was flexor.  Sensory exam:  Normal sensation to pin prick, temperature and vibration in all 4 extremities.    TESTING/RESULTS/RECORDS REVIEWED:  Date and Time: Exam End: 9/1/2021 19:50 Status: Final result Provider Status: Open   Priority: STAT           Study Result     Narrative & Impression   EXAM: CT HEAD WO CONTRAST      CLINICAL INDICATION: Seizure, nontraumatic (Age 18-40y)     COMPARISON: 2020     TECHNIQUE:  Multiple axial images of the head were obtained from the base of the skull  to the vertex with 5 mm slice thickness.  2-D coronal and parasagittal MIP  images are obtained.  Dose limiting software is utilized.     FINDINGS:  Postsurgical changes of right frontal temporal craniotomy with associated  encephalomalacia.  No acute intracranial hemorrhage, mass effect or midline  shift.  Mild right lateral ventricular prominence due to ex vacuo changes.     The craniocervical junction is unremarkable. There is no skull fracture or  aggressive bony lesion.     The orbital structures appear symmetric and unremarkable. The paranasal  sinuses and mastoid air cells are clear.     IMPRESSION:  Right frontal temporal postsurgical changes of encephalomalacia without  evidence for acute intracranial hemorrhage or mass effect.     Electronically Signed by: RAFAL PEREZ M.D.   Signed on: 2021 7:56 PM             EE2021  Impression  This is a mildly abnormal EEG due to the presence of focal cerebral dysfunction that was maximal over the left frontal anterior temporal region.  No seizure discharges or ictal patterns were seen.     Zafar Lutz MD   Physician   Neurology         Date and Time: Exam End: 2021 12:14 Status: Final result Provider Status: Reviewed   Priority: Routine                 Study Result     Narrative & Impression   EXAM: MRI BRAIN W WO CONTRAST     CLINICAL INDICATION: Seizure       COMPARISON: CT 2021     TECHNIQUE: Multiplanar and multisequence MR study of the brain is performed  without and with contrast.      FINDINGS: Right frontal craniotomy changes with large area of signal  alteration in the right frontal lobe unchanged from prior CT consistent  with area of prior surgery with encephalomalacia and gliosis.  No  additional signal alteration lesions otherwise seen.  No  restricted  diffusion or infarct.  No acute hemorrhage or mass seen.  Cerebral  ventricles and sulci and gyri appears stable.  Following contrast  administration no suspicious abnormal enhancing lesions are seen with small  vascular structures structures seen within the low signal intensity  abnormality.  Vascular structures show intact flow.  Sella and the orbit  appears unremarkable.  Paranasal sinuses are clear.        IMPRESSION:  1.   Large signal alteration lesion right frontal lobe consistent with area  of prior surgery with encephalomalacia and gliosis.    2.   No new focal lesion, mass or suspicious enhancing lesion.     Electronically Signed by: BILLIE ESTRADA M.D.   Signed on: 9/27/2021 9:13 AM          Date and Time: Exam End: 9/26/2021 12:14 Status: Final result Provider Status: Reviewed   Priority: Routine                 Study Result     Narrative & Impression   EXAM: MRA HEAD WO CONTRAST     CLINICAL INDICATION: History of AVM resection     COMPARISON: CTA 06/04/2020     TECHNIQUE: 3D time of flight intracranial MR angiogram is performed from  skull base to the vertex.     FINDINGS: Intracranial carotid arteries are patent.  Bilateral A1 and M1  segments patent with unremarkable anterior and middle cerebral arteries  are.  Small cluster of prominent vessels in the anterior right middle  cranial fossa questioned on prior CTA is not apparent.  The vertebral  basilar and the posterior cerebral arteries are also patent and  unremarkable.  No abnormal intracranial vascular structures or aneurysm  seen.        IMPRESSION:  1.    Unremarkable intracranial MR angiography.     Electronically Signed by: BILLIE ESTRADA M.D.   Signed on: 9/27/2021 9:20 AM          Keppra level: 13.5 (12/22/2021)        IMPRESSION/PLAN:  22-year-old male with history of AVM that ruptured and was repaired in June 2020.  Het had a seizure recently ( about a week ago). Patient was talking to his mother, when he started to have GTC like  activity, with up rolling of his eyes, and he also bit his tongue. The bite was on the right side. He did fall to the ground as well. The seizure lasted for about 2-3 minutes, and then he was confused, not able to talk for about 30 minutes.     Dx: New onset focal seizure  Last seizure: 1st September, 2021     My recommendations are as follows;     1. 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  2. Continue on Keppra 500 mg BID and counseled in detail about side effects including mood issues  3. No driving till seizure free for 6 consecutive months.  4. Epilepsy and seizure precautions given.  5. EEG completed reviewed  6. MRI brain and MRA head reviewed.  7. Call 911 for seizures lasting > 5 mins or not returning to baseline following seizure.  8. Keppra levels reviewed (12.5; 07/17/2023).         Epilepsy Education and precautions were discussed with patient.   The patient was instructed to keep a log with the frequency and descriptions of their seizures.    No driving or operating heavy machines for 6 months from the date of last seizure.    Seizure first aid and indications to call 911 or go to the emergency room were discussed.    Get immediate treatment for sickness, such as UTI or fever as it lowers seizure threshold  Avoid sleep deprivation & Avoid Alcohol  No supervising children alone  The patient has been counseled to avoid swimming or bathing alone, climbing ladders or roofs or above own height due to risk of serious injury should a seizure occur.   Avoid missing medications, Helmet during biking  Risk of SUDEP discussed with patient and recommended sleeping on back  Medical compliance with plan discussed and risks of non-compliance reviewed.    Patient expresses understanding of the plan.    Proper usage and side effects of medications reviewed & discussed.    Patient counseled in detail. RTC in 11-12 months unless otherwise indicated.     Jose Torres MD         Yes

## 2024-03-19 ENCOUNTER — OUTPATIENT (OUTPATIENT)
Dept: OUTPATIENT SERVICES | Facility: HOSPITAL | Age: 79
LOS: 1 days | Discharge: HOME | End: 2024-03-19

## 2024-03-19 DIAGNOSIS — Z98.890 OTHER SPECIFIED POSTPROCEDURAL STATES: Chronic | ICD-10-CM

## 2024-03-19 DIAGNOSIS — C34.00 MALIGNANT NEOPLASM OF UNSPECIFIED MAIN BRONCHUS: ICD-10-CM

## 2024-03-27 ENCOUNTER — APPOINTMENT (OUTPATIENT)
Dept: HEMATOLOGY ONCOLOGY | Facility: CLINIC | Age: 79
End: 2024-03-27
Payer: MEDICARE

## 2024-03-27 VITALS
BODY MASS INDEX: 23.61 KG/M2 | OXYGEN SATURATION: 100 % | HEART RATE: 86 BPM | SYSTOLIC BLOOD PRESSURE: 90 MMHG | HEIGHT: 70.98 IN | DIASTOLIC BLOOD PRESSURE: 60 MMHG | RESPIRATION RATE: 16 BRPM | TEMPERATURE: 97.7 F | WEIGHT: 168.65 LBS

## 2024-03-27 PROCEDURE — G2211 COMPLEX E/M VISIT ADD ON: CPT

## 2024-03-27 PROCEDURE — 99214 OFFICE O/P EST MOD 30 MIN: CPT

## 2024-03-27 NOTE — ASSESSMENT
[FreeTextEntry1] : 3/7/24 Cardiology note 3/7/24 medicine note reviewed. #1) 9/2022-Stage IB (pT2aN0) adenocarcinoma of the lung, moderately differentiated-status post right upper lobectomy/lymph node sampling. Intraoperative course complicated by cardiogenic shock. PD-L1 () TPS 0%. ALK negative. EGFR mutation analysis-+ EGFR mutation ([id number], CTG>CGG).  Per NCCN guidelines, may consider observation with expectant surveillance or chemotherapy for high risk patients and osimertinib (if EGFR exon 19 deletion or L858R). Examples of high risk features may include poorly differentiated tumors, vascular invasion, wedge resection, tumors greater than 4 cm, visceral pleural involvement or unknown lymph node status-these features do not apply in this case-in light of this and patient's comorbidities, favored expectant surveillance approach. Had discussed the EGFR mutation with potential implications in the future with treatment available if needed.  1/3/2023-CT angiogram of the chest-negative for PE. Mildly increased moderate loculated right pleural effusion since 12/27/2022. Indeterminant 1.7 cm apical posterior left upper lobe nodule, enlarged since 7/2022 (4 mm). 1/9/2023-S/P Right thoracentesis, 850 ml fluid was drained. Path revealed negative for malignant cells.  1/25/2023-PET/CT scan-hypermetabolic left upper lobe opacity highly suspicious for malignancy. Small left pleural effusion. No hypermetabolic hilar or mediastinal nodes. Postsurgical changes in the right upper lung field. No suspicious FDG avid lesion at the surgical site. Right pleural effusion slightly decreased from the recent CT chest. Contiguous foci of activity localizing to the vertebral ends of the left 10th-12th ribs with a new fracture in the 11th rib since the recent CT angiogram of the chest. Pattern of abnormalities compatible with traumatic injury, however, in the presence of a hypermetabolic left upper lobe mass, need to follow closely.  So, had enlarging left lung lesion suggestive of neoplastic disease-?second primary vs. met. lesion. Patient saw thoracic surgery and was deemed not to be a surgical candidate in light of comorbidities. He was referred to radiation oncology for radiation therapy-completed 3/1/2023-5 Fx/ 5000 cGy. Treatment Site: Left Lung. Had discussed potential role for systemic therapy-note +EGFR mutation with right-sided tumor. However, F/U liquid biopsy at this time -1/31/2023-no therapies associated with the genomic findings in this sample. KATHY, 3 Muts/Mb.  5/1/2023-CT chest-status post right upper lobectomy. No significant change in the 1.6 cm lobulated nodule in the left upper lobe when compared to previous exam. Once again, exact etiology is unclear. Patient with multiple comorbidities, so expectant surveillance.  8/8/2023-PET/CT scan-redemonstration of a left upper lobe lobulated lung nodule that is stable in size (anatomically) and has slightly decreased metabolically (FDG uptake). Continued follow-up as clinically indicated. 12/18/2023-CT chest-1. Post right upper lobe lobectomy. No evidence of local disease recurrence. 2. Findings compatible with small large airways inflammation more pronounced when compared to prior imaging. 3. A nodular lesion within the apical posterior segment of the left upper lobe which appears more nodular configuration measuring 32 x 20 mm (333:142). Findings concerning for left upper lobe neoplasm and correlation with whole-body PET/CT is suggested. 4. Bibasilar pleural effusions which have decreased slightly in extent on the right side. 5. Mediastinal as well as paraesophageal lymphadenopath slightly more pronounced. 6. New groundglass opacity within the superior aspect of the residual right middle lobe. Short interval surveillance in 3-6 months is suggested. 1/14/2024-PET/CT scan-  1. Nodular component of the left upper lobe apical segment changes, similar to the most recent CT chest, has increased in size since the prior PET/CT in 8/2023 with persistent increased activity. Increased left pleural effusion since the most recent CT chest. Left upper lobe nodular opacity is suspicious for malignant process; can be percutaneously biopsied medial to the left scapula under CT guidance. 2. Stable postsurgical changes in the right upper lung field with no abnormal activity; right middle lobe subpleural groundglass opacities with patchy activity may reflect inflammatory processes. Reassess on follow-up. 3. No suspicious FDG avid malignant lesion in the remaining field of view. --had discussed scan results and management options.  To consider biopsy of increasing lesion.  However, radiation oncology input noted/appreciated-may have postradiation changes still, and short interval repeat CT scan of the chest was advised-planned 4/2024-patient/wife wished for this approach. --clinically stable at present  #2) h/o anemia/thrombocytopenia. h/o Intermittent mild epistaxis and renal insufficiency suspect contribute to the anemia. Most recent hemoglobin and platelet count available, WNL. Follow CBC.  --Should hematologic scenario worsen, can consider further evaluation to r/o evolving BM disorder, should patient consent.  #3) continue F/U with cardiologist for elevated creatinine/BP med adjustment as needed; f/u with PCP for primary care issues.  #4) h/o elevated LFT's-liver on PET/CT scan unremarkable.?  Medication related, liver congestion.  Continue f/u with PCP.  Patient was given the opportunity to ask questions. His questions have been answered to his apparent satisfaction at this time.  -->RTO 1 month.

## 2024-03-27 NOTE — PHYSICAL EXAM
[Fully active, able to carry on all pre-disease performance without restriction] : Status 0 - Fully active, able to carry on all pre-disease performance without restriction [Normal] : normal appearance, no rash, nodules, vesicles, ulcers, erythema [de-identified] : coloration appeared normal [de-identified] : breathing appeared unlabored; slightly decreased BS at bases

## 2024-03-27 NOTE — HISTORY OF PRESENT ILLNESS
[T: ___] : T[unfilled] [Disease: _____________________] : Disease: [unfilled] [N: ___] : N[unfilled] [AJCC Stage: ____] : AJCC Stage: [unfilled] [de-identified] : Moderately differentiated invasive acinar adenocarcinoma [de-identified] : 9/2022-PD-L1 () TPS 0%. ALK negative.  EGFR mutation analysis-+ EGFR mutation ([id number], CTG>CGG).\par  1/31/2023-Liquid biopsy-no therapies associated with the genomic findings in this sample. KATHY, 3 Muts/Mb.  [de-identified] : 78 year old male, never smoker, w/ hx of bipolar, BPH, SCC of leg, CHF, CAD, hospitalized on May 11-17/2022 for acute MI s/p cardiac Cath with no intervention (LHC showed 100% occlusion for right coronary artery and 70% occlusion of left anterior descending artery. The arteries were not intervened upon due to viability study showing no viability in the areas supplied by the occluded arteries), cardiogenic shock, found to have new 1x 1.3cm LV thrombus on Plavix and Coumadin (Coumadin completed on 08/26/2022), who f/u with cardiologist and c/o SOB, CXR showed RUL nodule.  7/11/2022-CT chest-3.5 cm part solid mass is noted in the right upper lobe as described above. Primary consideration is lung carcinoma.  7/28/2022-PET/CT scan-FDG avid partly solid right upper lung nodule suspicious for malignancy. Small bilateral pleural effusion with minimal FDG avidity, nonspecific. Nonspecific mildly FDG avid low-attenuation left adrenal nodule, possibly an adenoma. Heterogeneous FDG activity in the soft tissue associated with surgical clips overlying the symphysis pubis, probably related from prior procedure.  9/14/20221674-GY-yelaju biopsy of right upper lung mass-positive for malignant cells, adenocarcinoma. PD-L1 () TPS 0%. ALK negative. EGFR mutation analysis-+ EGFR mutation ([id number], CTG>CGG).  9/23/2022-Status post right VATS-right upper lobectomy-pathology revealed adenocarcinoma, acinar predominant, margin negative. One level 10 lymph node excised and 5 interlobar lymph nodes negative for carcinoma. Right pleural fluid negative for malignant cells. Intraoperative course complicated by cardiogenic shock requiring inotropic support and diuretics.  10/21/2020 2-2 cm lobulated opacity in the left upper lobe, new compared to prior exam.  1/3/2023-CT angiogram of the chest-negative for PE. Mildly increased moderate loculated right pleural effusion since 12/27/2022. Indeterminant 1.7 cm apical posterior left upper lobe nodule, enlarged since 7/2022 (4 mm).  1/25/2023-PET/CT scan-hypermetabolic left upper lobe opacity highly suspicious for malignancy. Small left pleural effusion. No hypermetabolic hilar or mediastinal nodes. Postsurgical changes in the right upper lung field. No suspicious FDG avid lesion at the surgical site. Right pleural effusion slightly decreased from the recent CT chest. Contiguous foci of activity localizing to the vertebral ends of the left 10th-12th ribs with a new fracture in the 11th rib since the recent CT angiogram of the chest. Pattern of abnormalities compatible with traumatic injury, however, in the presence of a hypermetabolic left upper lobe mass, need to follow closely.  1/9/2023-S/P Right thoracentesis, 850 ml fluid was drained. Path revealed negative for malignant cells.  3/1/2023-Completed radiation therapy. Planned Dose: 5 Fx/ 5000 cGy. Treatment Site: Left Lung.  8/8/2023-PET/CT scan-Redemonstration of a left upper lobe lobulated lung nodule that is stable in size (anatomically) and has slightly decreased metabolically (FDG uptake). Continued follow-up as clinically indicated.  12/18/2023-CT chest-1. Post right upper lobe lobectomy. No evidence of local disease recurrence. 2. Findings compatible with small large airways inflammation more pronounced when compared to prior imaging. 3. A nodular lesion within the apical posterior segment of the left upper lobe which appears more nodular configuration measuring 32 x 20 mm (333:142). Findings concerning for left upper lobe neoplasm and correlation with whole-body PET/CT is suggested. 4. Bibasilar pleural effusions which have decreased slightly in extent on the right side. 5. Mediastinal as well as paraesophageal lymphadenopath slightly more pronounced. 6. New groundglass opacity within the superior aspect of the residual right middle lobe. Short interval surveillance in 3-6 months is suggested.  1/14/2024-PET/CT scan-  1. Nodular component of the left upper lobe apical segment changes, similar to the most recent CT chest, has increased in size since the prior PET/CT in 8/2023 with persistent increased activity. Increased left pleural effusion since the most recent CT chest. Left upper lobe nodular opacity is suspicious for malignant process; can be percutaneously biopsied medial to the left scapula under CT guidance. 2. Stable postsurgical changes in the right upper lung field with no abnormal activity; right middle lobe subpleural groundglass opacities with patchy activity may reflect inflammatory processes. Reassess on follow-up. 3. No suspicious FDG avid malignant lesion in the remaining field of view.   [de-identified] : Disease: Right upper lung   Pathology: Moderately differentiated invasive acinar adenocarcinoma   TNM stage: T2a (3.5 cm), N0 AJCC Stage: IB  9/2022-PD-L1 () TPS 0%. ALK negative. EGFR mutation analysis-+ EGFR mutation ([id number], CTG>CGG). 1/31/2023-Liquid biopsy-no therapies associated with the genomic findings in this sample. KATHY, 3 Muts/Mb. [de-identified] : S/P hospitalization in [place] for CHF exacerbation; required thoracentesis. Feels well currently. Sees Radiation MD next week. Has CT chest 4/1/24. No current complaints of chest pain; no hemoptysis; no fevers.  No complaints of bone pain.  No GI complaints. Remains on Plavix.  Sees Dr. Yu regularly (Psych). Has anxiety, insomnia.

## 2024-04-01 ENCOUNTER — APPOINTMENT (OUTPATIENT)
Dept: CT IMAGING | Facility: HOSPITAL | Age: 79
End: 2024-04-01
Payer: MEDICARE

## 2024-04-01 ENCOUNTER — OUTPATIENT (OUTPATIENT)
Dept: OUTPATIENT SERVICES | Facility: HOSPITAL | Age: 79
LOS: 1 days | End: 2024-04-01
Payer: COMMERCIAL

## 2024-04-01 DIAGNOSIS — Z98.890 OTHER SPECIFIED POSTPROCEDURAL STATES: Chronic | ICD-10-CM

## 2024-04-01 DIAGNOSIS — R91.1 SOLITARY PULMONARY NODULE: ICD-10-CM

## 2024-04-01 PROCEDURE — 71250 CT THORAX DX C-: CPT

## 2024-04-01 PROCEDURE — 71250 CT THORAX DX C-: CPT | Mod: 26,MH

## 2024-04-04 ENCOUNTER — APPOINTMENT (OUTPATIENT)
Dept: RADIATION ONCOLOGY | Facility: CLINIC | Age: 79
End: 2024-04-04
Payer: MEDICARE

## 2024-04-04 VITALS
WEIGHT: 169 LBS | BODY MASS INDEX: 23.58 KG/M2 | DIASTOLIC BLOOD PRESSURE: 38 MMHG | SYSTOLIC BLOOD PRESSURE: 69 MMHG | RESPIRATION RATE: 16 BRPM | TEMPERATURE: 93.4 F | HEART RATE: 81 BPM | OXYGEN SATURATION: 97 %

## 2024-04-04 PROCEDURE — 99214 OFFICE O/P EST MOD 30 MIN: CPT | Mod: GC

## 2024-04-05 NOTE — DISCHARGE NOTE NURSING/CASE MANAGEMENT/SOCIAL WORK - NURSING SECTION COMPLETE
I spoke with Fouzia, the patient's , schedule the patient's appointment for 04/10/24 at 11:00 am with the provider.   
I spoke with Fouzia, the patient's , schedule the patient's appointment for 04/10/24 at 11:00 am with the provider.   ----- Message from Sarah Cullen sent at 4/4/2024  1:41 PM CDT -----  Regarding: [id number]  Type:  Sooner Appointment Request    Patient is requesting a sooner appointment.  Patient declined first available appointment listed as well as another facility and provider .  Patient will not accept being placed on the waitlist and is requesting a message be sent to doctor.    Name of Caller: Fouzia - His [place]     When is the first available appointment? 08/05    Symptoms: Dementia     Would the patient rather a call back or a response via [facility]? Call back     Best Call Back Number: [phone] cell or [phone] office     Additional Information: pt was lost on his way to his appt that he missed back in March with Dr. Carlson. Will like a call back in regards to the seeing about being squeezed in sooner with any provider. She states he is a  and he gets lost a lot trying to make it to his destinations, so they had to take his car and license. Will like a call back from the nurse as soon as possible       
Patient/Caregiver provided printed discharge information.

## 2024-04-06 ENCOUNTER — INPATIENT (INPATIENT)
Facility: HOSPITAL | Age: 79
LOS: 3 days | Discharge: HOME | DRG: 683 | End: 2024-04-10
Attending: STUDENT IN AN ORGANIZED HEALTH CARE EDUCATION/TRAINING PROGRAM | Admitting: FAMILY MEDICINE
Payer: MEDICARE

## 2024-04-06 VITALS
HEART RATE: 83 BPM | DIASTOLIC BLOOD PRESSURE: 56 MMHG | WEIGHT: 166.89 LBS | SYSTOLIC BLOOD PRESSURE: 90 MMHG | TEMPERATURE: 98 F | OXYGEN SATURATION: 97 % | HEIGHT: 70.98 IN | RESPIRATION RATE: 18 BRPM

## 2024-04-06 DIAGNOSIS — Z98.890 OTHER SPECIFIED POSTPROCEDURAL STATES: Chronic | ICD-10-CM

## 2024-04-06 DIAGNOSIS — R42 DIZZINESS AND GIDDINESS: ICD-10-CM

## 2024-04-06 LAB
ALBUMIN SERPL ELPH-MCNC: 3.1 G/DL — LOW (ref 3.3–5)
ALP SERPL-CCNC: 204 U/L — HIGH (ref 40–120)
ALT FLD-CCNC: 29 U/L — SIGNIFICANT CHANGE UP (ref 10–45)
ANION GAP SERPL CALC-SCNC: 7 MMOL/L — SIGNIFICANT CHANGE UP (ref 5–17)
AST SERPL-CCNC: 35 U/L — SIGNIFICANT CHANGE UP (ref 10–40)
BASOPHILS # BLD AUTO: 0.03 K/UL — SIGNIFICANT CHANGE UP (ref 0–0.2)
BASOPHILS NFR BLD AUTO: 0.6 % — SIGNIFICANT CHANGE UP (ref 0–2)
BILIRUB SERPL-MCNC: 0.8 MG/DL — SIGNIFICANT CHANGE UP (ref 0.2–1.2)
BUN SERPL-MCNC: 101 MG/DL — HIGH (ref 7–23)
CALCIUM SERPL-MCNC: 8.9 MG/DL — SIGNIFICANT CHANGE UP (ref 8.4–10.5)
CHLORIDE SERPL-SCNC: 98 MMOL/L — SIGNIFICANT CHANGE UP (ref 96–108)
CO2 SERPL-SCNC: 26 MMOL/L — SIGNIFICANT CHANGE UP (ref 22–31)
CREAT SERPL-MCNC: 2.02 MG/DL — HIGH (ref 0.5–1.3)
EGFR: 33 ML/MIN/1.73M2 — LOW
EOSINOPHIL # BLD AUTO: 0.39 K/UL — SIGNIFICANT CHANGE UP (ref 0–0.5)
EOSINOPHIL NFR BLD AUTO: 7.4 % — HIGH (ref 0–6)
GLUCOSE SERPL-MCNC: 106 MG/DL — HIGH (ref 70–99)
HCT VFR BLD CALC: 36 % — LOW (ref 39–50)
HGB BLD-MCNC: 12.3 G/DL — LOW (ref 13–17)
IMM GRANULOCYTES NFR BLD AUTO: 0.6 % — SIGNIFICANT CHANGE UP (ref 0–0.9)
LYMPHOCYTES # BLD AUTO: 0.7 K/UL — LOW (ref 1–3.3)
LYMPHOCYTES # BLD AUTO: 13.2 % — SIGNIFICANT CHANGE UP (ref 13–44)
MCHC RBC-ENTMCNC: 31.2 PG — SIGNIFICANT CHANGE UP (ref 27–34)
MCHC RBC-ENTMCNC: 34.2 GM/DL — SIGNIFICANT CHANGE UP (ref 32–36)
MCV RBC AUTO: 91.4 FL — SIGNIFICANT CHANGE UP (ref 80–100)
MONOCYTES # BLD AUTO: 0.79 K/UL — SIGNIFICANT CHANGE UP (ref 0–0.9)
MONOCYTES NFR BLD AUTO: 14.9 % — HIGH (ref 2–14)
NEUTROPHILS # BLD AUTO: 3.36 K/UL — SIGNIFICANT CHANGE UP (ref 1.8–7.4)
NEUTROPHILS NFR BLD AUTO: 63.3 % — SIGNIFICANT CHANGE UP (ref 43–77)
NRBC # BLD: 0 /100 WBCS — SIGNIFICANT CHANGE UP (ref 0–0)
PLATELET # BLD AUTO: 166 K/UL — SIGNIFICANT CHANGE UP (ref 150–400)
POTASSIUM SERPL-MCNC: 5.3 MMOL/L — SIGNIFICANT CHANGE UP (ref 3.5–5.3)
POTASSIUM SERPL-SCNC: 5.3 MMOL/L — SIGNIFICANT CHANGE UP (ref 3.5–5.3)
PROT SERPL-MCNC: 7.6 G/DL — SIGNIFICANT CHANGE UP (ref 6–8.3)
RBC # BLD: 3.94 M/UL — LOW (ref 4.2–5.8)
RBC # FLD: 14.4 % — SIGNIFICANT CHANGE UP (ref 10.3–14.5)
SODIUM SERPL-SCNC: 131 MMOL/L — LOW (ref 135–145)
TROPONIN I, HIGH SENSITIVITY RESULT: 71.9 NG/L — SIGNIFICANT CHANGE UP
TROPONIN I, HIGH SENSITIVITY RESULT: 76.8 NG/L — HIGH
WBC # BLD: 5.3 K/UL — SIGNIFICANT CHANGE UP (ref 3.8–10.5)
WBC # FLD AUTO: 5.3 K/UL — SIGNIFICANT CHANGE UP (ref 3.8–10.5)

## 2024-04-06 PROCEDURE — 71045 X-RAY EXAM CHEST 1 VIEW: CPT | Mod: 26

## 2024-04-06 PROCEDURE — 93010 ELECTROCARDIOGRAM REPORT: CPT

## 2024-04-06 PROCEDURE — 99285 EMERGENCY DEPT VISIT HI MDM: CPT

## 2024-04-06 PROCEDURE — 99223 1ST HOSP IP/OBS HIGH 75: CPT

## 2024-04-06 RX ORDER — ASPIRIN/CALCIUM CARB/MAGNESIUM 324 MG
81 TABLET ORAL DAILY
Refills: 0 | Status: DISCONTINUED | OUTPATIENT
Start: 2024-04-07 | End: 2024-04-10

## 2024-04-06 RX ORDER — TEMAZEPAM 15 MG/1
1 CAPSULE ORAL
Refills: 0 | DISCHARGE

## 2024-04-06 RX ORDER — SODIUM CHLORIDE 9 MG/ML
1000 INJECTION INTRAMUSCULAR; INTRAVENOUS; SUBCUTANEOUS
Refills: 0 | Status: DISCONTINUED | OUTPATIENT
Start: 2024-04-06 | End: 2024-04-08

## 2024-04-06 RX ORDER — TAMSULOSIN HYDROCHLORIDE 0.4 MG/1
0.4 CAPSULE ORAL AT BEDTIME
Refills: 0 | Status: DISCONTINUED | OUTPATIENT
Start: 2024-04-07 | End: 2024-04-10

## 2024-04-06 RX ORDER — ACETAMINOPHEN 500 MG
650 TABLET ORAL EVERY 6 HOURS
Refills: 0 | Status: DISCONTINUED | OUTPATIENT
Start: 2024-04-06 | End: 2024-04-10

## 2024-04-06 RX ORDER — CLOPIDOGREL BISULFATE 75 MG/1
75 TABLET, FILM COATED ORAL DAILY
Refills: 0 | Status: DISCONTINUED | OUTPATIENT
Start: 2024-04-07 | End: 2024-04-10

## 2024-04-06 RX ORDER — FLUOXETINE HCL 10 MG
20 CAPSULE ORAL DAILY
Refills: 0 | Status: DISCONTINUED | OUTPATIENT
Start: 2024-04-07 | End: 2024-04-10

## 2024-04-06 RX ORDER — LANOLIN ALCOHOL/MO/W.PET/CERES
3 CREAM (GRAM) TOPICAL AT BEDTIME
Refills: 0 | Status: DISCONTINUED | OUTPATIENT
Start: 2024-04-06 | End: 2024-04-10

## 2024-04-06 RX ORDER — ONDANSETRON 8 MG/1
4 TABLET, FILM COATED ORAL EVERY 8 HOURS
Refills: 0 | Status: DISCONTINUED | OUTPATIENT
Start: 2024-04-06 | End: 2024-04-10

## 2024-04-06 RX ORDER — ATORVASTATIN CALCIUM 80 MG/1
80 TABLET, FILM COATED ORAL AT BEDTIME
Refills: 0 | Status: DISCONTINUED | OUTPATIENT
Start: 2024-04-06 | End: 2024-04-10

## 2024-04-06 RX ORDER — ZOLPIDEM TARTRATE 10 MG/1
5 TABLET ORAL ONCE
Refills: 0 | Status: DISCONTINUED | OUTPATIENT
Start: 2024-04-06 | End: 2024-04-06

## 2024-04-06 RX ORDER — METOPROLOL TARTRATE 50 MG
25 TABLET ORAL DAILY
Refills: 0 | Status: DISCONTINUED | OUTPATIENT
Start: 2024-04-07 | End: 2024-04-08

## 2024-04-06 RX ORDER — MIRTAZAPINE 45 MG/1
15 TABLET, ORALLY DISINTEGRATING ORAL AT BEDTIME
Refills: 0 | Status: DISCONTINUED | OUTPATIENT
Start: 2024-04-06 | End: 2024-04-10

## 2024-04-06 RX ORDER — SODIUM CHLORIDE 9 MG/ML
1000 INJECTION, SOLUTION INTRAVENOUS
Refills: 0 | Status: DISCONTINUED | OUTPATIENT
Start: 2024-04-06 | End: 2024-04-06

## 2024-04-06 RX ADMIN — ZOLPIDEM TARTRATE 5 MILLIGRAM(S): 10 TABLET ORAL at 22:58

## 2024-04-06 RX ADMIN — SODIUM CHLORIDE 75 MILLILITER(S): 9 INJECTION INTRAMUSCULAR; INTRAVENOUS; SUBCUTANEOUS at 17:19

## 2024-04-06 RX ADMIN — MIRTAZAPINE 15 MILLIGRAM(S): 45 TABLET, ORALLY DISINTEGRATING ORAL at 22:06

## 2024-04-06 RX ADMIN — ATORVASTATIN CALCIUM 80 MILLIGRAM(S): 80 TABLET, FILM COATED ORAL at 22:07

## 2024-04-06 NOTE — ED PROVIDER NOTE - ED STEMI HIDDEN
"Aim for the my plate model of eating; make 1/2 the plate non-starchy vegetables, 1/4 the plate lean protein, and 1/4 the plate starch    Choose healthy proteins like skinless poultry or fish.  Plant based proteins include, nuts, nut butters, seeds, beans, lentils, soy products like tofu and tempeh.    Choose whole grains like whole wheat pasta, brown rice, whole wheat bread, whole grain bread, quinoa, etc.  Look for the word \"whole\" on the label    Limit starch to no more than 1 cup (or 1 fist) per meal    Try including frozen vegetables (like green beans, broccoli, cauliflower, carrots, and spinach) for a convenient way to increase your intake    Aim for 25+ g of fiber daily.  Increase fiber gradually, over time.  As you increase your fiber intake, you may need to increase your water intake as well    Aim to eat within 1-2 hours of waking, and then again every 3-4 hours after for 3 meals and 1-2 snacks per day.    Consider hidden calories outside of meal time (beverages, condiments, sauces, added sugar, etc)    Use the nutrition facts label to compare products.  Aim for higher protein and fiber and choose products with the least amount of added sugar.  Remember, 1 teaspoon = 4 g of sugar.      Try plain greek yogurt in place of sour cream or as veggie dip.  Add seasoning of choice to plain greek yogurt.    Overnight oats:  Mix 1/2 cup rolled oats, 1/2-1 cup milk of choice (I do 1% cow milk), 1 tablespoon nut butter, 2 tablespoons plain greek yogurt, 1 tablespoon oscar seed, 1 chopped date, 1 tsp maple syrup or dark chocolate chips (optional).  Cover, refrigerate overnight, and enjoy in the morning!    Work toward increasing physical activity, aiming for 150 minutes per week.      "
hide

## 2024-04-06 NOTE — CHART NOTE - NSCHARTNOTEFT_GEN_A_CORE
Patient c/o insomnia and requested ambien. Reportedly takes benzodiazepines and ambien at home for sleep, and that melatonin does not help with sleep. Ordered 5mg ambien dose.

## 2024-04-06 NOTE — ED ADULT NURSE NOTE - NSFALLRISKINTERV_ED_ALL_ED

## 2024-04-06 NOTE — ED PROVIDER NOTE - PHYSICAL EXAMINATION
ATTENDING PHYSICAL EXAM DR. VALDES ***GEN - NAD; well appearing; A+O x3 ***HEAD - NC/AT ***EYES/NOSE - PERRL, EOMI, mucous membranes moist, no discharge ***THROAT: Oral cavity and pharynx normal. No inflammation, swelling, exudate, or lesions.  ***NECK: Neck supple, non-tender without lymphadenopathy, no masses, no thyromegaly.   ***PULMONARY - CTA b/l, symmetric breath sounds. ***CARDIAC -s1s2, RRR, no M,G,R  ***ABDOMEN - +BS, ND, NT, soft, no guarding, no rebound, no masses   ***BACK - no CVA tenderness, Normal  spine ***EXTREMITIES - symmetric pulses, 2+ dp, capillary refill < 2 seconds, no clubbing, no cyanosis, no edema ***SKIN - no rash or bruising   ***NEUROLOGIC - alert, motor 5/5 RUE/LUE/RLE/LLE/EHL/Plantar flexion, no pronator drift

## 2024-04-06 NOTE — H&P ADULT - ASSESSMENT
#pre-snycope  #acute on CKD  #orthostatic hypotension  -likely 2/2 to overdiuresis at home with bumex  -gentle IVFs x 12 hrs overnight. careful not to fluid overload pt  -hold bumex and aldactone for now  -trend Cr on am labs  -check orthostatics in AM  -monitor BP on vital checks    #BPH  -continue flomax 0.4mg once a day (wife had recently cut from 2 tabs to 1 tab 2/2 dizziness)       #pre-snycope  #acute on CKD  #orthostatic hypotension  -likely 2/2 to overdiuresis at home with bumex  -gentle IVFs x 12 hrs overnight. careful not to fluid overload pt  -hold bumex and aldactone for now  -trend Cr on am labs  -check orthostatics in AM  -monitor BP on vital checks    #CHF  #CAD  -hold diuretics tonight, re-eval in AM  daily weights  -ASA/Plavix  -Toprol with hold parameters    #BPH  -continue flomax 0.4mg once a day (wife had recently cut from 2 tabs to 1 tab 2/2 dizziness)    #HLD  -continue statin    #Depression  -continue psych meds including prozac and remeron at bedtime    #Lung Cancer  -recent CT chest showed concerning lesion  -will have f/u PET scan with their oncologist, per wife    Wife at bedside, all questions answered.    77yo male with PMHx of CAD, CHF, BPH, Anxiety, Bipolar d/o, Lung Ca, depression being admitted for acute on CKD and dizziness.     #pre-snycope  #acute on CKD  #hypotension  -likely 2/2 to overdiuresis at home with bumex  -gentle IVFs x 12 hrs overnight. careful not to fluid overload pt  -hold bumex and aldactone for now  -trend Cr on am labs  -check orthostatics in AM  -monitor BP on vital checks    #CHF  #CAD  -hold diuretics tonight, re-eval in AM  daily weights  -ASA/Plavix  -Toprol with hold parameters    #BPH  -continue flomax 0.4mg once a day (wife had recently cut from 2 tabs to 1 tab 2/2 dizziness)    #HLD  -continue statin    #Depression  -continue psych meds including prozac and remeron at bedtime    #Lung Cancer  -recent CT chest showed concerning lesion  -will have f/u PET scan with their oncologist, per wife    Wife at bedside, all questions answered.    79yo male with PMHx of CAD, CHF, BPH, Anxiety, Bipolar d/o, Lung Ca, depression being admitted for acute on CKD and dizziness.     #pre-snycope  #acute on CKD  #hypotension  -likely 2/2 to overdiuresis at home with bumex  -gentle IVFs x 12 hrs overnight. careful not to fluid overload pt  -hold bumex and aldactone for now  -trend Cr on am labs  -check orthostatics in AM  -monitor BP on vital checks    #CHF  #CAD  -hold diuretics tonight, re-eval in AM  -daily weights  -ASA/Plavix  -Toprol with hold parameters    #BPH  -continue flomax 0.4mg once a day (wife had recently cut from 2 tabs to 1 tab 2/2 dizziness)    #HLD  -continue statin    #Depression  -continue psych meds including prozac and remeron at bedtime    #Lung Cancer  -recent CT chest showed concerning lesion  -will have f/u PET scan with their oncologist, per wife    Wife at bedside, all questions answered.

## 2024-04-06 NOTE — PATIENT PROFILE ADULT - NSPROGENOTHERPROVIDER_GEN_A_NUR
-- DO NOT REPLY / DO NOT REPLY ALL --  -- Message is from Engagement Center Operations ([facility]) --    General Patient Message: Patient is calling inquiring about the salve that she was requesting. Patient was expecting a return call today. Please call back as soon as possible.    Patient would like to see if she can still see Dr. Smith before he leaves.     Patient would like the salve sent to [facility] on [address]. \[facility]"" in [place].           Caller Information       Type Contact Phone/Fax    04/27/2023 07:11 AM CDT Phone (Incoming) Liz Pitt (Self) [phone] (M)    04/27/2023 07:22 AM CDT Phone (Outgoing) Liz Pitt (Self) [phone] (M)    04/27/2023 04:14 PM CDT Phone (Incoming) Liz Pitt (Self) [phone] (M)        Alternative phone number: N/A    Can a detailed message be left? Yes    Message Turnaround: [place]:    Refer to site's KB page for routing instructions    Please give this turnaround time to the caller:   \"You can expect to receive a response 1-3 business days after your provider's clinical team reviews the message\"               none

## 2024-04-06 NOTE — ED PROVIDER NOTE - OBJECTIVE STATEMENT
78 male presents emergency department after presyncopal episode.  Patient states he was getting ready to shower and began feeling dizzy lightheaded and went down slowly to the ground.  He denies falling, no pain in his chest no preceding palpitations.  He states he was in his usual state of health prior to this episode.  He has been taking Bumex as prescribed 1 to 2 mg depending upon how he is feeling.  He states his wife  helps him to manage his medications and she is the one that instructed him how to take his Bumex.  He denies any chest pain today's feeling in usual state of health no black or bloody stool no diarrhea has been eating well.  He had breakfast this morning.  He states he has had similar episodes in the past.  He has never been recommended for a pacemaker or AICD.

## 2024-04-06 NOTE — ED PROVIDER NOTE - CLINICAL SUMMARY MEDICAL DECISION MAKING FREE TEXT BOX
72-year-old male presents to the emergency department after presyncopal episode, found to be hypotensive by EMS 60/40, given 250 normal saline with resolution of hypotension.  Given history, exam and workup, low suspicion for  decompensated HF, ICH (no trauma, headache), seizure (no witnessed seizure like activity, no postictal period, tongue laceration, bladder incontinence), stroke (no focal neuro deficits), HOCM (no murmur, family history of sudden death), ACS (neg troponin, no anginal pain), aortic dissection (no chest pain), malignant arrhythmia on ekg or any family history of sudden death, or GI bleed (stable hgb). Low suspicion for PE given normal vital signs, absence of chest pain or dyspnea, no evidence of DVT, no recent surgery/immobilization. Based on [place] syncope rule, patient is low risk and well appearing here, plan to discharge the patient home with  cardiology–PMD follow up.

## 2024-04-06 NOTE — ED PROVIDER NOTE - NSGCTIMESPENTATTENDAPP_GEN_A_CORE
Patient Identification:  Name:  Radha Joseph  Age:  30 y.o.  Sex:  female  :  1992  MRN:  [id number]  Visit Number:  [id number]  Primary Care Provider:  Jaja Monroy APRN    Length of stay:  0    Subjective/Interval History/Consultants/Procedures     Chief Complaint:   Chief Complaint   Patient presents with   • Fall       Subjective/Interval History:    30 y.o. female who was admitted on 2022 with syncope likely 2/2 prolonged QT interval    PMH is significant for hydrocephalus and transmyelitis  For complete admission information, please see history and physical.     Consultations:  Cardiology    Procedures/Scans:  CXR  XR left shoulder  XR left hand  CT head without contrast    Today, the patient was resting comfortably in no acute distress.  She states she feels about the same today as yesterday.  No new changes to report.  Denies constipation diarrhea.  Denies shortness of breath, cough.  Continue with plan of care, follow cardiology recs.    Room location at the time of evaluation was 310.    ----------------------------------------------------------------------------------------------------------------------  [facility] Meds:  gabapentin, 300 mg, Oral, TID  lidocaine, 1 patch, Transdermal, Q24H  magnesium sulfate, 4 g, Intravenous, Once  potassium chloride, 40 mEq, Oral, Q4H  sodium chloride, 10 mL, Intravenous, Q12H      Pharmacy Consult,       ----------------------------------------------------------------------------------------------------------------------      Objective     Vital Signs:  Temp:  [98 °F (36.7 °C)-99.2 °F (37.3 °C)] 99.2 °F (37.3 °C)  Heart Rate:  [] 109  Resp:  [18] 18  BP: (102-169)/(58-84) 102/63      22  1242 22  0500   Weight: 104 kg (230 lb) 105 kg (231 lb)     Body mass index is 39.65 kg/m².    Intake/Output Summary (Last 24 hours) at 2022 1054  Last data filed at 2022 0900  Gross per 24 hour   Intake 240 ml   Output --   Net 240 ml      I/O this shift:  In: 240 [P.O.:240]  Out: -   Diet: Regular/House Diet; Texture: Regular Texture (IDDSI 7); Fluid Consistency: Thin (IDDSI 0)  ----------------------------------------------------------------------------------------------------------------------    Physical Exam  Vitals and nursing note reviewed.   Constitutional:       General: She is not in acute distress.  HENT:      Head: Normocephalic and atraumatic.   Eyes:      General: No scleral icterus.     Conjunctiva/sclera: Conjunctivae normal.   Cardiovascular:      Rate and Rhythm: Regular rhythm. Tachycardia present.   Pulmonary:      Effort: Pulmonary effort is normal.      Breath sounds: Normal breath sounds.   Abdominal:      Palpations: Abdomen is soft.      Tenderness: There is no abdominal tenderness.   Musculoskeletal:      Right lower leg: No edema.      Left lower leg: No edema.   Skin:     General: Skin is warm and dry.   Neurological:      Mental Status: She is alert and oriented to person, place, and time. Mental status is at baseline.   Psychiatric:         Mood and Affect: Mood normal.         Behavior: Behavior normal.                ----------------------------------------------------------------------------------------------------------------------  Tele:        ----------------------------------------------------------------------------------------------------------------------  Results from last 7 days   Lab Units 12/22/22  1254   TROPONIN T ng/mL <0.010     Results from last 7 days   Lab Units 12/22/22  1254   WBC 10*3/mm3 10.37   HEMOGLOBIN g/dL 12.8   HEMATOCRIT % 38.6   MCV fL 83.0   MCHC g/dL 33.2   PLATELETS 10*3/mm3 336   INR  1.11*         Results from last 7 days   Lab Units 12/23/22  0217 12/22/22  1254   SODIUM mmol/L 140 140   POTASSIUM mmol/L 3.5 4.4   MAGNESIUM mg/dL 1.8  --    CHLORIDE mmol/L 105 107   CO2 mmol/L 22.5 24.3   BUN mg/dL 8 5*   CREATININE mg/dL 0.57 0.58   CALCIUM mg/dL 8.7 8.2*   GLUCOSE mg/dL 96 99    ALBUMIN g/dL  --  3.64   BILIRUBIN mg/dL  --  0.2   ALK PHOS U/L  --  79   AST (SGOT) U/L  --  11   ALT (SGPT) U/L  --  12   Estimated Creatinine Clearance: 170.4 mL/min (by C-G formula based on SCr of 0.57 mg/dL).  No results found for: AMMONIA      No results found for: BLOODCX  No results found for: URINECX  No results found for: WOUNDCX  No results found for: STOOLCX  ----------------------------------------------------------------------------------------------------------------------  Imaging Results (Last 24 Hours)     Procedure Component Value Units Date/Time    XR Chest 1 View [[id number]] Collected: 12/22/22 1446     Updated: 12/22/22 1448    Narrative:      EXAM:    XR Chest, 1 View     EXAM DATE:    12/22/2022 1:47 PM     CLINICAL HISTORY:    fall     TECHNIQUE:    Frontal view of the chest.     COMPARISON:    08/13/2022     FINDINGS:    Lungs:  Unremarkable.  No consolidation.    Pleural space:  Unremarkable.  No pneumothorax.    Heart:  Unremarkable.  No cardiomegaly.    Mediastinum:  Unremarkable.    Bones/joints:  Unremarkable.       Impression:        Unremarkable exam. No acute cardiopulmonary findings identified.     This report was finalized on 12/22/2022 2:46 PM by Dr. Rufino Johnston MD.       CT Head Without Contrast [[id number]] Collected: 12/22/22 1444     Updated: 12/22/22 1448    Narrative:      EXAM:    CT Head Without Intravenous Contrast     EXAM DATE:    12/22/2022 2:12 PM     CLINICAL HISTORY:    Syncope/presyncope, cerebrovascular cause suspected     TECHNIQUE:    Axial computed tomography images of the head/brain without intravenous  contrast.  Sagittal and coronal reformatted images were created and  reviewed.  This CT exam was performed using one or more of the following  dose reduction techniques:  automated exposure control, adjustment of  the mA and/or kV according to patient size, and/or use of iterative  reconstruction technique.     COMPARISON:    08/13/2022     FINDINGS:     Brain:  Corpus callosum agenesis again noted.  No cerebral edema  identified.  Stable appearance of cerebellum.  No hemorrhage.  No  significant white matter disease.    Midline shift:  No midline shift.    Ventricles:  Ventriculomegaly is stable.    Bones/joints:  Unremarkable.  No acute fracture.    Soft tissues:  Unremarkable.    Sinuses:  Unremarkable as visualized.  No acute sinusitis.    Mastoid air cells:  Unremarkable as visualized.  No mastoid effusion.       Impression:      1.  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  2.  No acute intracranial findings identified.     This report was finalized on 12/22/2022 2:46 PM by Dr. Rufino Johnston MD.       XR Shoulder 2+ View Left [[id number]] Collected: 12/22/22 1444     Updated: 12/22/22 1446    Narrative:      EXAM:    XR Left Shoulder Complete, 2 or More Views     EXAM DATE:    12/22/2022 1:48 PM     CLINICAL HISTORY:    Pain     TECHNIQUE:    Two or more views of the left shoulder.     COMPARISON:    No relevant prior studies available.     FINDINGS:    Bones/joints:  Unremarkable.  No acute fracture.  No dislocation.    Soft tissues:  Unremarkable.       Impression:        No acute findings in the left shoulder.     This report was finalized on 12/22/2022 2:44 PM by Dr. Rufino Johnston MD.       XR Hand 2 View Left [[id number]] Collected: 12/22/22 1443     Updated: 12/22/22 1446    Narrative:      EXAM:    XR Left Hand, 2 Views     EXAM DATE:    12/22/2022 1:48 PM     CLINICAL HISTORY:    pain     TECHNIQUE:    Frontal and lateral views of the left hand.     COMPARISON:    No relevant prior studies available.     FINDINGS:    Bones/joints:  Unremarkable.  No acute fracture.  No dislocation.    Soft tissues:  Unremarkable.  No radiopaque foreign body.       Impression:        No acute findings in the left hand.     This report was finalized on 12/22/2022 2:44 PM by Dr. Rufino Johnston MD.            ----------------------------------------------------------------------------------------------------------------------   I have reviewed the above laboratory values for 12/23/22    Assessment/Plan     Active Hospital Problems    Diagnosis  POA   • **Syncope, cardiogenic [R55]  Yes         ASSESSMENT/PLAN:    Syncope likely secondary to prolonged QT interval  Admit to telemetry for further observation with continuous cardiac monitoring  Consult cardiology  Hold meds known to prolong QT interval  Patient did have UA in the ED noted for 3+ leukocytes and 1+ bacteria.  Patient received a dose of Rocephin in the ED however given lack of symptoms in which case a UA is not diagnostic of a UTI will hold further antibiotics.  Standing orders in place for recurrent syncope or chest pain  Patient was started on electrolyte replacement protocol per night shift PA, electrolyte levels WNL this a.m. as is the CHEM panel as a whole.  New EKG obtained this a.m. revealing sinus tachycardia with biatrial enlargement and prolonged QTc interval at 528  Pending cardiology recs.  Patient concerned about muscle spasms as we have held her tizanidine to avoid prolonging the QT interval.  Discussed with patient why its important we hold that medication.  Contacted inpatient pharmacy for assistance, recommending cyclobenzaprine as it is not known to prolong QT interval.  Will initiate cyclobenzaprine 10 mg 3 times daily as needed for muscle spasm.      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

## 2024-04-06 NOTE — ED PROVIDER NOTE - NS ED ROS FT
Review of Systems:  	•	CONSTITUTIONAL - no fever, no diaphoresis, no weight change  	•	SKIN - no rash  	•	HEMATOLOGIC - no bleeding, no bruising  	•	EYES - no eye pain, no blurred vision  	•	ENT - no change in hearing, no pain  	•	RESPIRATORY - no shortness of breath, no cough  	•	CARDIAC - no chest pain, no palpitations  	•	GI - no abd pain, no nausea, no vomiting, no diarrhea, no constipation, no bleeding  	•	GENITO-URINARY -no dysuria; no hematuria   	•	MUSCULOSKELETAL - no joint paint, no swelling, no redness  	•	NEUROLOGIC - no weakness, no headache, no anesthesia, no paresthesias

## 2024-04-06 NOTE — PATIENT PROFILE ADULT - FLU SEASON?
Encounter Summary
  Created on: 2020
 
 Viviana Ricci
 External Reference #: [id number]
 : 46
 Sex: Female
 
 Demographics
 
 
+-----------------------+----------------------+
| Address               | [address]      |
|                       | [place]  [address] |
+-----------------------+----------------------+
| Home Phone            | [phone]      |
+-----------------------+----------------------+
| Preferred Language    | Unknown              |
+-----------------------+----------------------+
| Marital Status        | Single               |
+-----------------------+----------------------+
| [employer] Affiliation | [id number]                 |
+-----------------------+----------------------+
| Race                  | Unknown              |
+-----------------------+----------------------+
| Ethnic Group          | Unknown              |
+-----------------------+----------------------+
 
 
 Author
 
 
+--------------+--------------------------------------------+
| Author       | [facility]  |
|              | and [facility]                                |
+--------------+--------------------------------------------+
| Organization | [facility]  |
|              | and [facility]                                |
+--------------+--------------------------------------------+
| Address      | Unknown                                    |
+--------------+--------------------------------------------+
| Phone        | Unavailable                                |
+--------------+--------------------------------------------+
 
 
 
 Support
 
 
+----------------+--------------+---------------------+-----------------+
| Name           | Relationship | Address             | Phone           |
+----------------+--------------+---------------------+-----------------+
| Ada/Ed Radhames | ECON         | [address]              | [phone] |
|                |              | [place]  |                 |
|                |              |  [id number]              |                 |
+----------------+--------------+---------------------+-----------------+
 
 
 
 
 Care Team Providers
 
 
+-----------------------+------+-------------+
| Care Team Member Name | Role | Phone       |
+-----------------------+------+-------------+
 PCP  | Unavailable |
+-----------------------+------+-------------+
 
 
 
 Encounter Details
 
 
+--------+-----------+----------------------+----------------------+-------------+
| Date   | Type      | Department           | Care Team            | Description |
+--------+-----------+----------------------+----------------------+-------------+
| / | [facility]  |   [facility] |   Marzena Moser  |             |
|    | Encounter |  MED CTR XRAY  [address] | M, DO  [address]      |             |
|        |           |  [place]       | [place]      |             |
|        |           | [place] [address] | [place]      |             |
|        |           |   [phone]       | [address] [phone]  |             |
|        |           |                      |  [phone] (Fax)  |             |
+--------+-----------+----------------------+----------------------+-------------+
 
 
 
 Social History
 
 
+----------------+-------+-----------+--------+------+
| Tobacco Use    | Types | Packs/Day | Years  | Date |
|                |       |           | Used   |      |
+----------------+-------+-----------+--------+------+
| Never Assessed |       |           |        |      |
+----------------+-------+-----------+--------+------+
 
 
 
+------------------+---------------+
| Sex Assigned at  | Date Recorded |
| Birth            |               |
+------------------+---------------+
| Not on file      |               |
+------------------+---------------+
 
 
 
+----------------+-------------+-------------+
| Job Start Date | Occupation  | Industry    |
+----------------+-------------+-------------+
| Not on file    | Not on file | Not on file |
+----------------+-------------+-------------+
 
 
 
+----------------+--------------+------------+
| Travel History | Travel Start | Travel End |
+----------------+--------------+------------+
 
 
 
 
+-------------------------------------+
| No recent travel history available. |
+-------------------------------------+
 documented as of this encounter
 
 Plan of Treatment
 
 
+--------+-----------+------------+----------------------+-------------------+
| Date   | Type      | Specialty  | Care Team            | Description       |
+--------+-----------+------------+----------------------+-------------------+
| / | Office    | Cardiology |   Tonia Wilson,    |                   |
|    | Visit     |            | ARNJESSICA  [address]   |                   |
|        |           |            | St  [place], [place]  |                   |
|        |           |            | [address]  [phone]  |                   |
|        |           |            |  [phone] (Fax)  |                   |
+--------+-----------+------------+----------------------+-------------------+
| / | Hospital  | Radiology  |   Popeye Townsend, |                   |
|    | Encounter |            |  MD  [address] |                   |
|        |           |            |  [place],   |                   |
|        |           |            | [place] [address]             |                   |
|        |           |            | [phone]         |                   |
|        |           |            | [phone] (Fax)   |                   |
+--------+-----------+------------+----------------------+-------------------+
| / | Surgery   | Radiology  |   Popeye Townsend, | CV EP PPM SYSTEM  |
|    |           |            |  MD  [address] | IMPLANT           |
|        |           |            |  [place],   |                   |
|        |           |            | [place] [address]             |                   |
|        |           |            | [phone]         |                   |
|        |           |            | [phone] (Fax)   |                   |
+--------+-----------+------------+----------------------+-------------------+
| 02/10/ | Clinical  | Cardiology |                      |                   |
|    | Support   |            |                      |                   |
+--------+-----------+------------+----------------------+-------------------+
| / | Office    | Cardiology |   Tonia Wilson,    |                   |
|    | Visit     |            | BELKIS  [address]   |                   |
|        |           |            | [place]  |                   |
|        |           |            | [address]  [phone]  |                   |
|        |           |            |  [phone] (Fax)  |                   |
+--------+-----------+------------+----------------------+-------------------+
| / | Off-Site  | Nephrology |   Marzena Moser  |                   |
|    | Visit     |            | DO ETIENNE  [address] [place] [address]      |                   |
|        |           |            | [address]      |                   |
|        |           |            | [place]      |                   |
|        |           |            | [address] [phone]  |                   |
|        |           |            |  [phone] (Fax)  |                   |
+--------+-----------+------------+----------------------+-------------------+
 documented as of this encounter
 
 Visit Diagnoses
 Not on filedocumented in this encounter Yes...

## 2024-04-06 NOTE — H&P ADULT - NSHPLABSRESULTS_GEN_ALL_CORE
LABS:                        12.3   5.30  )-----------( 166      ( 06 Apr 2024 12:55 )             36.0     04-06    131<L>  |  98  |  101<H>  ----------------------------<  106<H>  5.3   |  26  |  2.02<H>    Ca    8.9      06 Apr 2024 12:55    TPro  7.6  /  Alb  3.1<L>  /  TBili  0.8  /  DBili  x   /  AST  35  /  ALT  29  /  AlkPhos  204<H>  04-06      Urinalysis Basic - ( 06 Apr 2024 12:55 )    Color: x / Appearance: x / SG: x / pH: x  Gluc: 106 mg/dL / Ketone: x  / Bili: x / Urobili: x   Blood: x / Protein: x / Nitrite: x   Leuk Esterase: x / RBC: x / WBC x   Sq Epi: x / Non Sq Epi: x / Bacteria: x       CAPILLARY BLOOD GLUCOSE            Urinalysis Basic - ( 06 Apr 2024 12:55 )    Color: x / Appearance: x / SG: x / pH: x  Gluc: 106 mg/dL / Ketone: x  / Bili: x / Urobili: x   Blood: x / Protein: x / Nitrite: x   Leuk Esterase: x / RBC: x / WBC x   Sq Epi: x / Non Sq Epi: x / Bacteria: x        RADIOLOGY & ADDITIONAL TESTS:    Consultant(s) Notes Reviewed:  [x ] YES  [ ] NO  Care Discussed with Consultants/Other Providers [ x] YES  [ ] NO  Imaging Personally Reviewed:  [x ] YES  [ ] NO

## 2024-04-06 NOTE — PATIENT PROFILE ADULT - VISION (WITH CORRECTIVE LENSES IF THE PATIENT USUALLY WEARS THEM):
Normal vision: sees adequately in most situations; can see medication labels, newsprint Quality 431: Preventive Care And Screening: Unhealthy Alcohol Use - Screening: Patient not identified as an unhealthy alcohol user when screened for unhealthy alcohol use using a systematic screening method Quality 110: Preventive Care And Screening: Influenza Immunization: Influenza Immunization previously received during influenza season Quality 226: Preventive Care And Screening: Tobacco Use: Screening And Cessation Intervention: Patient screened for tobacco use and is an ex/non-smoker Quality 137: Melanoma: Continuity Of Care - Recall System: Patient information entered into a recall system that includes: target date for the next exam specified AND a process to follow up with patients regarding missed or unscheduled appointments Detail Level: Detailed Quality 111:Pneumonia Vaccination Status For Older Adults: Pneumococcal Vaccination Previously Received

## 2024-04-06 NOTE — ED ADULT NURSE NOTE - OBJECTIVE STATEMENT
Pt presents to the ER complaining of dizziness around 11:30AM. Pt states that he was about to go into the shower and started to feel dizzy and lightheaded so he lowered himself onto the ground. Pt has a skin tear to the left forearm. A&OX4. Pt denies SOB, chest pain, nausea, vomiting, fever, dizziness or headache.

## 2024-04-06 NOTE — H&P ADULT - NSHPPHYSICALEXAM_GEN_ALL_CORE
T(C): 36.7 (04-06-24 @ 18:08), Max: 36.8 (04-06-24 @ 12:45)  HR: 76 (04-06-24 @ 18:08) (62 - 83)  BP: 100/58 (04-06-24 @ 18:08) (84/51 - 102/64)  RR: 18 (04-06-24 @ 18:08) (16 - 18)  SpO2: 100% (04-06-24 @ 18:08) (96% - 100%)  Wt(kg): --Vital Signs Last 24 Hrs  T(C): 36.7 (06 Apr 2024 18:08), Max: 36.8 (06 Apr 2024 12:45)  T(F): 98 (06 Apr 2024 18:08), Max: 98.3 (06 Apr 2024 12:45)  HR: 76 (06 Apr 2024 18:08) (62 - 83)  BP: 100/58 (06 Apr 2024 18:08) (84/51 - 102/64)  BP(mean): --  RR: 18 (06 Apr 2024 18:08) (16 - 18)  SpO2: 100% (06 Apr 2024 18:08) (96% - 100%)    Parameters below as of 06 Apr 2024 18:08  Patient On (Oxygen Delivery Method): room air        PHYSICAL EXAM:  GENERAL: NAD  HENT:  Atraumatic, Normocephalic; No tonsillar erythema, exudates, or enlargement; Moist mucous membranes;   EYES: EOMI, PERRLA, conjunctiva and sclera clear, no lid-lag  NECK: Supple, No JVD, Normal thyroid  NERVOUS SYSTEM:  CN II - XII intact; Sensation intact; Motor Strength 5/5 B/L upper and lower extremities. Alert and oriented x 3  CHEST/LUNG:  No rales, rhonchi, wheezing, or rubs; normal respiratory effort, no intercostal retractions; No pitting edema  HEART: Regular rate and rhythm; No murmurs, rubs, or gallops  ABDOMEN: Soft, Nontender, Nondistended; Bowel sounds present; No HSM  MUSCULOSKELETAL/EXTREMITIES:  2+ Peripheral Pulses, No clubbing, or digital cyanosis  Examination of the joints, bones, and muscles of one or more of the following six areas:  1. head and neck 2. spine, ribs, and pelvis 3. right upper extremity 4. left upper extremity 5. right lower extremity 6. left lower extremity   ROM (pain, crepitation or contracture)  SKIN: No rashes or lesions; normal texture and temperature  PSYCH: Appropriate affect

## 2024-04-06 NOTE — ED PROVIDER NOTE - FAMILY DETAILS FREE TEXT FOR MDM ADDL HISTORY OBTAINED FROM QUESTION
family arrives stating that his blood pressure this morning was systolic in the 80s prior to administration of a.m. meds patient was given half dose of his blood pressure medication given that situation. family arrives stating that his blood pressure this morning was systolic in the 80s prior to administration of a.m. meds patient was given half dose of his blood pressure medication given that situation.As per his wife patient has been receiving around 3 mg of Bumex daily for the past week and was noted to be hypotensive at the  radiation oncologist office on Friday systolic in the 80s.  Patient's blood pressure normally runs 90/60.  This morning he also received Flomax.

## 2024-04-06 NOTE — PATIENT PROFILE ADULT - FALL HARM RISK - HARM RISK INTERVENTIONS
Assistance with ambulation/Assistance OOB with selected safe patient handling equipment/Communicate Risk of Fall with Harm to all staff/Monitor gait and stability/Reinforce activity limits and safety measures with patient and family/Sit up slowly, dangle for a short time, stand at bedside before walking/Tailored Fall Risk Interventions/Visual Cue: Yellow wristband and red socks/Bed in lowest position, wheels locked, appropriate side rails in place/Call bell, personal items and telephone in reach/Instruct patient to call for assistance before getting out of bed or chair/Non-slip footwear when patient is out of bed/[place] to call system/Physically safe environment - no spills, clutter or unnecessary equipment/Purposeful Proactive Rounding/Room/bathroom lighting operational, light cord in reach

## 2024-04-06 NOTE — H&P ADULT - HISTORY OF PRESENT ILLNESS
77yo male with PMHx of CAD, CHF, BPH, Anxiety, Bipolar d/o, Lung Ca, depression, presents to the ED with dizziness this morning while taking a shower. Pt said it came on suddenly and was able to lower himself to the ground. Did not pass out or LOC. Did not injure himself. Denies cp, sob, palpitations or other complaints. Wife at bedside, also providing history. They closely watch his weight to dose his bumex. Normally he ahs been aroun 168 pounds lately. They noted a couple days ago he was 171 lbs. Bumex was increased from 1 tab BID to 2 tabs AM and 1 tab PM. Pt also took his flomax this morning.

## 2024-04-07 ENCOUNTER — RESULT REVIEW (OUTPATIENT)
Age: 79
End: 2024-04-07

## 2024-04-07 LAB
ANION GAP SERPL CALC-SCNC: 9 MMOL/L — SIGNIFICANT CHANGE UP (ref 5–17)
BUN SERPL-MCNC: 87 MG/DL — HIGH (ref 7–23)
CALCIUM SERPL-MCNC: 8.8 MG/DL — SIGNIFICANT CHANGE UP (ref 8.4–10.5)
CHLORIDE SERPL-SCNC: 103 MMOL/L — SIGNIFICANT CHANGE UP (ref 96–108)
CO2 SERPL-SCNC: 24 MMOL/L — SIGNIFICANT CHANGE UP (ref 22–31)
CREAT SERPL-MCNC: 1.62 MG/DL — HIGH (ref 0.5–1.3)
EGFR: 43 ML/MIN/1.73M2 — LOW
GLUCOSE SERPL-MCNC: 97 MG/DL — SIGNIFICANT CHANGE UP (ref 70–99)
HCT VFR BLD CALC: 33.7 % — LOW (ref 39–50)
HGB BLD-MCNC: 11.6 G/DL — LOW (ref 13–17)
MCHC RBC-ENTMCNC: 31.1 PG — SIGNIFICANT CHANGE UP (ref 27–34)
MCHC RBC-ENTMCNC: 34.4 GM/DL — SIGNIFICANT CHANGE UP (ref 32–36)
MCV RBC AUTO: 90.3 FL — SIGNIFICANT CHANGE UP (ref 80–100)
NRBC # BLD: 0 /100 WBCS — SIGNIFICANT CHANGE UP (ref 0–0)
NT-PROBNP SERPL-SCNC: 1975 PG/ML — HIGH (ref 0–300)
PLATELET # BLD AUTO: 166 K/UL — SIGNIFICANT CHANGE UP (ref 150–400)
POTASSIUM SERPL-MCNC: 4.6 MMOL/L — SIGNIFICANT CHANGE UP (ref 3.5–5.3)
POTASSIUM SERPL-SCNC: 4.6 MMOL/L — SIGNIFICANT CHANGE UP (ref 3.5–5.3)
RBC # BLD: 3.73 M/UL — LOW (ref 4.2–5.8)
RBC # FLD: 14.4 % — SIGNIFICANT CHANGE UP (ref 10.3–14.5)
SODIUM SERPL-SCNC: 136 MMOL/L — SIGNIFICANT CHANGE UP (ref 135–145)
WBC # BLD: 5.96 K/UL — SIGNIFICANT CHANGE UP (ref 3.8–10.5)
WBC # FLD AUTO: 5.96 K/UL — SIGNIFICANT CHANGE UP (ref 3.8–10.5)

## 2024-04-07 PROCEDURE — 93306 TTE W/DOPPLER COMPLETE: CPT | Mod: 26

## 2024-04-07 PROCEDURE — 99233 SBSQ HOSP IP/OBS HIGH 50: CPT

## 2024-04-07 RX ORDER — ZOLPIDEM TARTRATE 10 MG/1
5 TABLET ORAL ONCE
Refills: 0 | Status: DISCONTINUED | OUTPATIENT
Start: 2024-04-07 | End: 2024-04-07

## 2024-04-07 RX ADMIN — TAMSULOSIN HYDROCHLORIDE 0.4 MILLIGRAM(S): 0.4 CAPSULE ORAL at 22:03

## 2024-04-07 RX ADMIN — Medication 20 MILLIGRAM(S): at 12:12

## 2024-04-07 RX ADMIN — Medication 81 MILLIGRAM(S): at 12:12

## 2024-04-07 RX ADMIN — CLOPIDOGREL BISULFATE 75 MILLIGRAM(S): 75 TABLET, FILM COATED ORAL at 12:12

## 2024-04-07 RX ADMIN — MIRTAZAPINE 15 MILLIGRAM(S): 45 TABLET, ORALLY DISINTEGRATING ORAL at 22:03

## 2024-04-07 RX ADMIN — ZOLPIDEM TARTRATE 5 MILLIGRAM(S): 10 TABLET ORAL at 22:03

## 2024-04-07 NOTE — PROGRESS NOTE ADULT - SUBJECTIVE AND OBJECTIVE BOX
Patient is a 78y old  Male who presents with a chief complaint of dizziness and low BP (06 Apr 2024 16:22)      Patient seen and examined at bedside. feeling improvement in weakness though has not attempted OOB yet. denies headache, fever, chills, cp, sob, n/v, abd pain.     ALLERGIES:  No Known Allergies    MEDICATIONS  (STANDING):  aspirin enteric coated 81 milliGRAM(s) Oral daily  atorvastatin 80 milliGRAM(s) Oral at bedtime  clopidogrel Tablet 75 milliGRAM(s) Oral daily  FLUoxetine 20 milliGRAM(s) Oral daily  metoprolol succinate ER 25 milliGRAM(s) Oral daily  mirtazapine 15 milliGRAM(s) Oral at bedtime  sodium chloride 0.9%. 1000 milliLiter(s) (75 mL/Hr) IV Continuous <Continuous>  tamsulosin 0.4 milliGRAM(s) Oral at bedtime    MEDICATIONS  (PRN):  acetaminophen     Tablet .. 650 milliGRAM(s) Oral every 6 hours PRN Mild Pain (1 - 3), Moderate Pain (4 - 6), Severe Pain (7 - 10)  aluminum hydroxide/magnesium hydroxide/simethicone Suspension 30 milliLiter(s) Oral every 4 hours PRN Dyspepsia  melatonin 3 milliGRAM(s) Oral at bedtime PRN Insomnia  ondansetron Injectable 4 milliGRAM(s) IV Push every 8 hours PRN Nausea and/or Vomiting    Vital Signs Last 24 Hrs  T(F): 97.5 (07 Apr 2024 05:27), Max: 98.3 (06 Apr 2024 12:45)  HR: 74 (07 Apr 2024 05:27) (62 - 83)  BP: 95/50 (07 Apr 2024 05:27) (84/51 - 102/64)  RR: 17 (07 Apr 2024 05:27) (16 - 18)  SpO2: 99% (07 Apr 2024 05:27) (96% - 100%)  I&O's Summary    06 Apr 2024 07:01  -  07 Apr 2024 07:00  --------------------------------------------------------  IN: 785 mL / OUT: 0 mL / NET: 785 mL      BMI (kg/m2): 23.3 (04-06-24 @ 12:45)  PHYSICAL EXAM:  General: NAD, A/O x 3  ENT: MMM, no scleral icterus  Neck: Supple, No JVD  Lungs: Clear to auscultation bilaterally, no wheezes, rales, rhonchi  Cardio: RRR, S1/S2  Abdomen: Soft, Nontender, Nondistended; Bowel sounds present  Extremities: No calf tenderness, No pitting edema    LABS:                        11.6   5.96  )-----------( 166      ( 07 Apr 2024 06:00 )             33.7       04-07    136  |  103  |  87  ----------------------------<  97  4.6   |  24  |  1.62    Ca    8.8      07 Apr 2024 06:00    TPro  7.6  /  Alb  3.1  /  TBili  0.8  /  DBili  x   /  AST  35  /  ALT  29  /  AlkPhos  204  04-06            CARDIAC MARKERS ( 06 Apr 2024 13:55 )  x     / 71.9 ng/L / x     / x     / x      CARDIAC MARKERS ( 06 Apr 2024 12:55 )  x     / 76.8 ng/L / x     / x     / x                            Urinalysis Basic - ( 07 Apr 2024 06:00 )    Color: x / Appearance: x / SG: x / pH: x  Gluc: 97 mg/dL / Ketone: x  / Bili: x / Urobili: x   Blood: x / Protein: x / Nitrite: x   Leuk Esterase: x / RBC: x / WBC x   Sq Epi: x / Non Sq Epi: x / Bacteria: x            RADIOLOGY & ADDITIONAL TESTS:    Care Discussed with Consultants/Other Providers:    Patient is a 78y old  Male who presents with a chief complaint of dizziness and low BP (06 Apr 2024 16:22)      Patient seen and examined at bedside. feeling improvement in weakness though has not attempted OOB yet. denies headache, fever, chills, cp, sob, n/v, abd pain.    ALLERGIES:  No Known Allergies    MEDICATIONS  (STANDING):  aspirin enteric coated 81 milliGRAM(s) Oral daily  atorvastatin 80 milliGRAM(s) Oral at bedtime  clopidogrel Tablet 75 milliGRAM(s) Oral daily  FLUoxetine 20 milliGRAM(s) Oral daily  metoprolol succinate ER 25 milliGRAM(s) Oral daily  mirtazapine 15 milliGRAM(s) Oral at bedtime  sodium chloride 0.9%. 1000 milliLiter(s) (75 mL/Hr) IV Continuous <Continuous>  tamsulosin 0.4 milliGRAM(s) Oral at bedtime    MEDICATIONS  (PRN):  acetaminophen     Tablet .. 650 milliGRAM(s) Oral every 6 hours PRN Mild Pain (1 - 3), Moderate Pain (4 - 6), Severe Pain (7 - 10)  aluminum hydroxide/magnesium hydroxide/simethicone Suspension 30 milliLiter(s) Oral every 4 hours PRN Dyspepsia  melatonin 3 milliGRAM(s) Oral at bedtime PRN Insomnia  ondansetron Injectable 4 milliGRAM(s) IV Push every 8 hours PRN Nausea and/or Vomiting    Vital Signs Last 24 Hrs  T(F): 97.5 (07 Apr 2024 05:27), Max: 98.3 (06 Apr 2024 12:45)  HR: 74 (07 Apr 2024 05:27) (62 - 83)  BP: 95/50 (07 Apr 2024 05:27) (84/51 - 102/64)  RR: 17 (07 Apr 2024 05:27) (16 - 18)  SpO2: 99% (07 Apr 2024 05:27) (96% - 100%)  I&O's Summary    06 Apr 2024 07:01  -  07 Apr 2024 07:00  --------------------------------------------------------  IN: 785 mL / OUT: 0 mL / NET: 785 mL      BMI (kg/m2): 23.3 (04-06-24 @ 12:45)  PHYSICAL EXAM:  General: NAD, A/O x 3  ENT: MMM, no scleral icterus  Neck: Supple, No JVD  Lungs: Clear to auscultation bilaterally, no wheezes, rales, rhonchi  Cardio: RRR, S1/S2  Abdomen: Soft, Nontender, Nondistended; Bowel sounds present  Extremities: No calf tenderness, No pitting edema    LABS:                        11.6   5.96  )-----------( 166      ( 07 Apr 2024 06:00 )             33.7       04-07    136  |  103  |  87  ----------------------------<  97  4.6   |  24  |  1.62    Ca    8.8      07 Apr 2024 06:00    TPro  7.6  /  Alb  3.1  /  TBili  0.8  /  DBili  x   /  AST  35  /  ALT  29  /  AlkPhos  204  04-06            CARDIAC MARKERS ( 06 Apr 2024 13:55 )  x     / 71.9 ng/L / x     / x     / x      CARDIAC MARKERS ( 06 Apr 2024 12:55 )  x     / 76.8 ng/L / x     / x     / x                            Urinalysis Basic - ( 07 Apr 2024 06:00 )    Color: x / Appearance: x / SG: x / pH: x  Gluc: 97 mg/dL / Ketone: x  / Bili: x / Urobili: x   Blood: x / Protein: x / Nitrite: x   Leuk Esterase: x / RBC: x / WBC x   Sq Epi: x / Non Sq Epi: x / Bacteria: x            RADIOLOGY & ADDITIONAL TESTS:    Care Discussed with Consultants/Other Providers:

## 2024-04-07 NOTE — DIETITIAN INITIAL EVALUATION ADULT - PERTINENT MEDS FT
MEDICATIONS  (STANDING):  aspirin enteric coated 81 milliGRAM(s) Oral daily  atorvastatin 80 milliGRAM(s) Oral at bedtime  clopidogrel Tablet 75 milliGRAM(s) Oral daily  FLUoxetine 20 milliGRAM(s) Oral daily  metoprolol succinate ER 25 milliGRAM(s) Oral daily  mirtazapine 15 milliGRAM(s) Oral at bedtime  sodium chloride 0.9%. 1000 milliLiter(s) (75 mL/Hr) IV Continuous <Continuous>  tamsulosin 0.4 milliGRAM(s) Oral at bedtime    MEDICATIONS  (PRN):  acetaminophen     Tablet .. 650 milliGRAM(s) Oral every 6 hours PRN Mild Pain (1 - 3), Moderate Pain (4 - 6), Severe Pain (7 - 10)  aluminum hydroxide/magnesium hydroxide/simethicone Suspension 30 milliLiter(s) Oral every 4 hours PRN Dyspepsia  melatonin 3 milliGRAM(s) Oral at bedtime PRN Insomnia  ondansetron Injectable 4 milliGRAM(s) IV Push every 8 hours PRN Nausea and/or Vomiting

## 2024-04-07 NOTE — CONSULT NOTE ADULT - SUBJECTIVE AND OBJECTIVE BOX
Time of visit:    CHIEF COMPLAINT: Patient is a 78y old  Male who presents with a chief complaint of Dizziness and giddiness     (07 Apr 2024 11:18)      HPI: This is a 78 yr old man  CAD, CHF, BPH, Anxiety, Bipolar d/o, Lung Ca, depression, presents to the ED with dizziness this morning while taking a shower. Pt said it came on suddenly and was able to lower himself to the ground. Did not pass out or LOC. Did not injure himself. Denies cp, sob, palpitations or other complaints. Wife at bedside, also providing history. They closely watch his weight to dose his bumex. Normally he ahs been aroun 168 pounds lately. They noted a couple days ago he was 171 lbs. Bumex was increased from 1 tab BID to 2 tabs AM and 1 tab PM. Pt also took his flomax this morning.  (06 Apr 2024 16:22)   Patient seen and examined.     PAST MEDICAL & SURGICAL HISTORY:  BPH (benign prostatic hyperplasia)      Bipolar disorder      Depression      Anxiety      Umbilical hernia, incarcerated      Depression      Constipation      Urinary retention      CAD (coronary artery disease)      SCC (squamous cell carcinoma)      Lung mass      Other nonspecific abnormal finding of lung field      LV (left ventricular) mural thrombus      History of inguinal hernia      History of CHF (congestive heart failure)      History of arthroscopy of knee  Right, 1987      History of tonsillectomy  1952      History of hernia repair      H/O coronary angiogram          Allergies    No Known Allergies    Intolerances        MEDICATIONS  (STANDING):  aspirin enteric coated 81 milliGRAM(s) Oral daily  atorvastatin 80 milliGRAM(s) Oral at bedtime  clopidogrel Tablet 75 milliGRAM(s) Oral daily  FLUoxetine 20 milliGRAM(s) Oral daily  metoprolol succinate ER 25 milliGRAM(s) Oral daily  mirtazapine 15 milliGRAM(s) Oral at bedtime  sodium chloride 0.9%. 1000 milliLiter(s) (75 mL/Hr) IV Continuous <Continuous>  tamsulosin 0.4 milliGRAM(s) Oral at bedtime      MEDICATIONS  (PRN):  acetaminophen     Tablet .. 650 milliGRAM(s) Oral every 6 hours PRN Mild Pain (1 - 3), Moderate Pain (4 - 6), Severe Pain (7 - 10)  aluminum hydroxide/magnesium hydroxide/simethicone Suspension 30 milliLiter(s) Oral every 4 hours PRN Dyspepsia  melatonin 3 milliGRAM(s) Oral at bedtime PRN Insomnia  ondansetron Injectable 4 milliGRAM(s) IV Push every 8 hours PRN Nausea and/or Vomiting   Medications up to date at time of exam.    Medications up to date at time of exam.    FAMILY HISTORY:  Family history of depression (Mother)    FH: CAD (coronary artery disease) (Sibling, Sibling)    Family history of diabetes mellitus (DM) (Sibling)        SOCIAL HISTORY  Smoking History: [ x  ]  non smoking/smoke exposure, [   ] former smoker  Living Condition: [   ] apartment, [   ] private house  Work History:   Travel History: denies recent travel  Illicit Substance Use: denies  Alcohol Use: denies    REVIEW OF SYSTEMS:    CONSTITUTIONAL:  denies fevers, chills, sweats, weight loss    HEENT:  denies diplopia or blurred vision, sore throat or runny nose.    CARDIOVASCULAR:  denies pressure, squeezing, tightness, or heaviness about the chest; no palpitations.    RESPIRATORY:  denies SOB, cough, OGLESBY, wheezing.    GASTROINTESTINAL:  denies abdominal pain, nausea, vomiting or diarrhea.    GENITOURINARY: denies dysuria, frequency or urgency.    NEUROLOGIC:  denies numbness, tingling, seizures or weakness.    PSYCHIATRIC:  denies disorder of thought or mood.    MSK: denies swelling, redness      PHYSICAL EXAMINATION:    GENERAL: The patient is a well-developed, well-nourished, in no apparent distress.     Vital Signs Last 24 Hrs  T(C): 36.2 (07 Apr 2024 11:48), Max: 36.8 (06 Apr 2024 22:10)  T(F): 97.1 (07 Apr 2024 11:48), Max: 98.3 (06 Apr 2024 22:10)  HR: 72 (07 Apr 2024 11:48) (62 - 76)  BP: 81/48 (07 Apr 2024 11:48) (81/48 - 102/64)  BP(mean): --  RR: 15 (07 Apr 2024 11:48) (15 - 18)  SpO2: 96% (07 Apr 2024 11:48) (96% - 100%)    Parameters below as of 06 Apr 2024 18:08  Patient On (Oxygen Delivery Method): room air       (if applicable)    Chest Tube (if applicable)    HEENT: Head is normocephalic and atraumatic. Extraocular muscles are intact. Mucous membranes are moist.     NECK: Supple, no palpable adenopathy.    LUNGS: Clear to auscultation, no wheezing, rales, or rhonchi.    HEART: Regular rate and rhythm without murmur.    ABDOMEN: Soft, nontender, and nondistended.  No hepatosplenomegaly is noted.    RENAL: No difficulty voiding, no pelvic pain    EXTREMITIES: Without any cyanosis, clubbing, rash, lesions or edema.    NEUROLOGIC: Awake, alert, oriented, grossly intact    SKIN: Warm, dry, good turgor.      LABS:                        11.6   5.96  )-----------( 166      ( 07 Apr 2024 06:00 )             33.7     04-07    136  |  103  |  87<H>  ----------------------------<  97  4.6   |  24  |  1.62<H>    Ca    8.8      07 Apr 2024 06:00    TPro  7.6  /  Alb  3.1<L>  /  TBili  0.8  /  DBili  x   /  AST  35  /  ALT  29  /  AlkPhos  204<H>  04-06      Urinalysis Basic - ( 07 Apr 2024 06:00 )    Color: x / Appearance: x / SG: x / pH: x  Gluc: 97 mg/dL / Ketone: x  / Bili: x / Urobili: x   Blood: x / Protein: x / Nitrite: x   Leuk Esterase: x / RBC: x / WBC x   Sq Epi: x / Non Sq Epi: x / Bacteria: x      ICROBIOLOGY: (if applicable)    RADIOLOGY & ADDITIONAL STUDIES:  EKG:   CXR: < from: CT Chest No Cont (04.01.24 @ 11:17) >    ACC: [id number] EXAM:  CT CHEST   ORDERED BY: JASWINDER DUNBAR     PROCEDURE DATE:  04/01/2024          INTERPRETATION:  INDICATION: History of lung cancer treated with   stereotactic radiation to the left upper lobe in early 2023,   indeterminate findings on recent chest CT    TECHNIQUE: Helical acquisition images of the chest without intravenous   contrast. Maximum intensity projection images were generated.    COMPARISON: Most recent exam of PET/CT 1/14/2024.    FINDINGS:    LUNGS/AIRWAYS/PLEURA: Overall similar size of left lobe 2.3 cm solid   nodule within the radiation field, although now has a more rounded   contour (3-46), gradually becoming more discrete over multiple prior   studies. Right upper lobectomy. Unchanged mild indistinct groundglass in   the superior aspect of the right middle lobe (3-43). No pleural effusion.    LYMPH NODES/MEDIASTINUM: No lymphadenopathy.    HEART/VASCULATURE: Enlarged heart. No pericardial effusion. Coronary   artery calcifications. Normal caliber aorta.    UPPER ABDOMEN: Cholelithiasis. Unchanged subcentimeter hypodensity in the   liver, too small to characterize. Unchanged left adrenal nodule, likely   an adenoma. Partially included right renal cyst.    BONES/SOFT TISSUES: Unremarkable.        IMPRESSION:    Left upper lobe nodule within the radiation field has a more rounded   contour, and gradually become more discrete over multiple prior studies.   Neoplasm should be considered.    --- End of Report ---            YONATHAN GARCIA M.D., ATTENDING RADIOLOGIST  This document has been electronically signed. Apr 4 2024  8:55AM    < end of copied text >    ECHO:    IMPRESSION: 78y Male PAST MEDICAL & SURGICAL HISTORY:  BPH (benign prostatic hyperplasia)      Bipolar disorder      Depression      Anxiety      Umbilical hernia, incarcerated      Depression      Constipation      Urinary retention      CAD (coronary artery disease)      SCC (squamous cell carcinoma)      Lung mass      Other nonspecific abnormal finding of lung field      LV (left ventricular) mural thrombus      History of inguinal hernia      History of CHF (congestive heart failure)      History of arthroscopy of knee  Right, 1987      History of tonsillectomy  1952      History of hernia repair      H/O coronary angiogram       p/w       IMP: This is a 78 yr old man  CAD, CHF, BPH, Anxiety, Bipolar d/o, Lung Ca, depression, presents to the ED with dizziness this morning while taking a shower. CT chest with    Time of visit:    CHIEF COMPLAINT: Patient is a 78y old  Male who presents with a chief complaint of Dizziness and giddiness     (07 Apr 2024 11:18)      HPI: This is a 78 yr old man  CAD, CHF, BPH, Anxiety, Bipolar d/o, Lung Ca, depression, presents to the ED with dizziness this morning while taking a shower. Pt said it came on suddenly and was able to lower himself to the ground. Did not pass out or LOC. Did not injure himself. Denies cp, sob, palpitations or other complaints. Wife at bedside, also providing history. They closely watch his weight to dose his bumex. Normally he ahs been aroun 168 pounds lately. They noted a couple days ago he was 171 lbs. Bumex was increased from 1 tab BID to 2 tabs AM and 1 tab PM. Pt also took his flomax this morning.  (06 Apr 2024 16:22)   Patient seen and examined.     PAST MEDICAL & SURGICAL HISTORY:  BPH (benign prostatic hyperplasia)      Bipolar disorder      Depression      Anxiety      Umbilical hernia, incarcerated      Depression      Constipation      Urinary retention      CAD (coronary artery disease)      SCC (squamous cell carcinoma)      Lung mass      Other nonspecific abnormal finding of lung field      LV (left ventricular) mural thrombus      History of inguinal hernia      History of CHF (congestive heart failure)      History of arthroscopy of knee  Right, 1987      History of tonsillectomy  1952      History of hernia repair      H/O coronary angiogram          Allergies    No Known Allergies    Intolerances        MEDICATIONS  (STANDING):  aspirin enteric coated 81 milliGRAM(s) Oral daily  atorvastatin 80 milliGRAM(s) Oral at bedtime  clopidogrel Tablet 75 milliGRAM(s) Oral daily  FLUoxetine 20 milliGRAM(s) Oral daily  metoprolol succinate ER 25 milliGRAM(s) Oral daily  mirtazapine 15 milliGRAM(s) Oral at bedtime  sodium chloride 0.9%. 1000 milliLiter(s) (75 mL/Hr) IV Continuous <Continuous>  tamsulosin 0.4 milliGRAM(s) Oral at bedtime      MEDICATIONS  (PRN):  acetaminophen     Tablet .. 650 milliGRAM(s) Oral every 6 hours PRN Mild Pain (1 - 3), Moderate Pain (4 - 6), Severe Pain (7 - 10)  aluminum hydroxide/magnesium hydroxide/simethicone Suspension 30 milliLiter(s) Oral every 4 hours PRN Dyspepsia  melatonin 3 milliGRAM(s) Oral at bedtime PRN Insomnia  ondansetron Injectable 4 milliGRAM(s) IV Push every 8 hours PRN Nausea and/or Vomiting   Medications up to date at time of exam.    Medications up to date at time of exam.    FAMILY HISTORY:  Family history of depression (Mother)    FH: CAD (coronary artery disease) (Sibling, Sibling)    Family history of diabetes mellitus (DM) (Sibling)        SOCIAL HISTORY  Smoking History: [ x  ]  non smoking/smoke exposure, [   ] former smoker  Living Condition: [   ] apartment, [   ] private house  Work History:   Travel History: denies recent travel  Illicit Substance Use: denies  Alcohol Use: denies    REVIEW OF SYSTEMS:    CONSTITUTIONAL:  denies fevers, chills, sweats, weight loss    HEENT:  denies diplopia or blurred vision, sore throat or runny nose.    CARDIOVASCULAR:  denies pressure, squeezing, tightness, or heaviness about the chest; no palpitations.    RESPIRATORY:  denies SOB, cough, OGLESBY, wheezing.    GASTROINTESTINAL:  denies abdominal pain, nausea, vomiting or diarrhea.    GENITOURINARY: denies dysuria, frequency or urgency.    NEUROLOGIC:  denies numbness, tingling, seizures or weakness.    PSYCHIATRIC:  denies disorder of thought or mood.    MSK: denies swelling, redness      PHYSICAL EXAMINATION:    GENERAL: The patient is a well-developed, well-nourished, in no apparent distress.     Vital Signs Last 24 Hrs  T(C): 36.2 (07 Apr 2024 11:48), Max: 36.8 (06 Apr 2024 22:10)  T(F): 97.1 (07 Apr 2024 11:48), Max: 98.3 (06 Apr 2024 22:10)  HR: 72 (07 Apr 2024 11:48) (62 - 76)  BP: 81/48 (07 Apr 2024 11:48) (81/48 - 102/64)  BP(mean): --  RR: 15 (07 Apr 2024 11:48) (15 - 18)  SpO2: 96% (07 Apr 2024 11:48) (96% - 100%)    Parameters below as of 06 Apr 2024 18:08  Patient On (Oxygen Delivery Method): room air       (if applicable)    Chest Tube (if applicable)    HEENT: Head is normocephalic and atraumatic. Extraocular muscles are intact. Mucous membranes are moist.     NECK: Supple, no palpable adenopathy.    LUNGS: Clear to auscultation, no wheezing, rales, or rhonchi.    HEART: Regular rate and rhythm without murmur.    ABDOMEN: Soft, nontender, and nondistended.  No hepatosplenomegaly is noted.    RENAL: No difficulty voiding, no pelvic pain    EXTREMITIES: Without any cyanosis, clubbing, rash, lesions or edema.    NEUROLOGIC: Awake, alert, oriented, grossly intact    SKIN: Warm, dry, good turgor.      LABS:                        11.6   5.96  )-----------( 166      ( 07 Apr 2024 06:00 )             33.7     04-07    136  |  103  |  87<H>  ----------------------------<  97  4.6   |  24  |  1.62<H>    Ca    8.8      07 Apr 2024 06:00    TPro  7.6  /  Alb  3.1<L>  /  TBili  0.8  /  DBili  x   /  AST  35  /  ALT  29  /  AlkPhos  204<H>  04-06      Urinalysis Basic - ( 07 Apr 2024 06:00 )    Color: x / Appearance: x / SG: x / pH: x  Gluc: 97 mg/dL / Ketone: x  / Bili: x / Urobili: x   Blood: x / Protein: x / Nitrite: x   Leuk Esterase: x / RBC: x / WBC x   Sq Epi: x / Non Sq Epi: x / Bacteria: x      ICROBIOLOGY: (if applicable)    RADIOLOGY & ADDITIONAL STUDIES:  EKG:   CXR: < from: CT Chest No Cont (04.01.24 @ 11:17) >    ACC: [id number] EXAM:  CT CHEST   ORDERED BY: JASWINDER DUNBAR     PROCEDURE DATE:  04/01/2024          INTERPRETATION:  INDICATION: History of lung cancer treated with   stereotactic radiation to the left upper lobe in early 2023,   indeterminate findings on recent chest CT    TECHNIQUE: Helical acquisition images of the chest without intravenous   contrast. Maximum intensity projection images were generated.    COMPARISON: Most recent exam of PET/CT 1/14/2024.    FINDINGS:    LUNGS/AIRWAYS/PLEURA: Overall similar size of left lobe 2.3 cm solid   nodule within the radiation field, although now has a more rounded   contour (3-46), gradually becoming more discrete over multiple prior   studies. Right upper lobectomy. Unchanged mild indistinct groundglass in   the superior aspect of the right middle lobe (3-43). No pleural effusion.    LYMPH NODES/MEDIASTINUM: No lymphadenopathy.    HEART/VASCULATURE: Enlarged heart. No pericardial effusion. Coronary   artery calcifications. Normal caliber aorta.    UPPER ABDOMEN: Cholelithiasis. Unchanged subcentimeter hypodensity in the   liver, too small to characterize. Unchanged left adrenal nodule, likely   an adenoma. Partially included right renal cyst.    BONES/SOFT TISSUES: Unremarkable.        IMPRESSION:    Left upper lobe nodule within the radiation field has a more rounded   contour, and gradually become more discrete over multiple prior studies.   Neoplasm should be considered.    --- End of Report ---            YONATHAN GARCIA M.D., ATTENDING RADIOLOGIST  This document has been electronically signed. Apr 4 2024  8:55AM    < end of copied text >    ECHO:    IMPRESSION: 78y Male PAST MEDICAL & SURGICAL HISTORY:  BPH (benign prostatic hyperplasia)      Bipolar disorder      Depression      Anxiety      Umbilical hernia, incarcerated      Depression      Constipation      Urinary retention      CAD (coronary artery disease)      SCC (squamous cell carcinoma)      Lung mass      Other nonspecific abnormal finding of lung field      LV (left ventricular) mural thrombus      History of inguinal hernia      History of CHF (congestive heart failure)      History of arthroscopy of knee  Right, 1987      History of tonsillectomy  1952      History of hernia repair      H/O coronary angiogram       p/w       IMP: This is a 78 yr old man  CAD, CHF, BPH, Anxiety, Bipolar d/o, Lung Ca, depression, presents to the ED with dizziness this morning while taking a shower. CT chest with HARRY nodule . Hypotension due to combination of multiple meds       Sugg    - Continue syncope work up   - Tele monitoring   - Duoneb q6h prn  ( pat is not using any BD inhalers at present )   - Re-evaluate BP meds  - Fall precaution   - Monitor renal fx   - Out pat pulmo f/u

## 2024-04-07 NOTE — DIETITIAN INITIAL EVALUATION ADULT - PERTINENT LABORATORY DATA
04-07    136  |  103  |  87<H>  ----------------------------<  97  4.6   |  24  |  1.62<H>    Ca    8.8      07 Apr 2024 06:00    TPro  7.6  /  Alb  3.1<L>  /  TBili  0.8  /  DBili  x   /  AST  35  /  ALT  29  /  AlkPhos  204<H>  04-06

## 2024-04-07 NOTE — PROGRESS NOTE ADULT - ASSESSMENT
77 y/o M with PMHx of CAD, CHF, BPH, Anxiety, Bipolar d/o, Lung Ca, depression being admitted for acute on CKD and dizziness.     #pre-snycope  #acute on CKD  #hypotension  -likely 2/2 to overdiuresis at home with bumex  -gentle IVFs x 12 hrs overnight. careful not to fluid overload pt  -hold bumex and aldactone for now  -trend Cr on am labs  -check orthostatics in AM  -monitor BP on vital checks    #CHF  #CAD  -hold diuretics tonight, re-eval in AM  -daily weights  -ASA/Plavix  -Toprol with hold parameters    #BPH  -continue flomax 0.4mg once a day (wife had recently cut from 2 tabs to 1 tab 2/2 dizziness)    #HLD  -continue statin    #Depression  -continue psych meds including prozac and remeron at bedtime    #Lung Cancer  -recent CT chest showed concerning lesion  -will have f/u PET scan with their oncologist, per wife    Wife at bedside, all questions answered.    77 y/o M with PMHx of CAD, CHF, BPH, Anxiety, Bipolar d/o, Lung Ca, depression being admitted for acute on CKD and dizziness.    #Pre-syncope  #Acute on CKD3 - improving  #Hypotension  -Likely 2/2 to overdiuresis at home with bumex  -Gentle IVFs x 12 hrs overnight. monitor for fluid overload  -Renal function improving, cont to monitor  -Bumex, aldactone held  -Follow orthostatics  -PT/OT    #Hyponatremia  -Improved    #HFrEF (EF 25-30%)  #CAD  -Hold diuretics for now  -I/Os, daily weight  -Follow echo  -ASA/Plavix  -Toprol 25mg daily with hold parameters - did not receive 2/2 parameters    #BPH  -Continue flomax 0.4mg daily (wife had recently cut from 2 tabs to 1 tab 2/2 dizziness)    #HLD  -Continue lipitor 80mg qhs  -Follow lipid panel    #Depression  -Continue prozac 20mg daily, remeron at bedtime    #Lung Cancer  -Recent CT chest showed concerning lesion  -Will have f/u PET scan with their oncologist, per wife    Patient prefers to update wife

## 2024-04-08 LAB
ALBUMIN SERPL ELPH-MCNC: 2.7 G/DL — LOW (ref 3.3–5)
ALP SERPL-CCNC: 193 U/L — HIGH (ref 40–120)
ALT FLD-CCNC: 29 U/L — SIGNIFICANT CHANGE UP (ref 10–45)
ANION GAP SERPL CALC-SCNC: 7 MMOL/L — SIGNIFICANT CHANGE UP (ref 5–17)
AST SERPL-CCNC: 41 U/L — HIGH (ref 10–40)
BILIRUB SERPL-MCNC: 0.8 MG/DL — SIGNIFICANT CHANGE UP (ref 0.2–1.2)
BUN SERPL-MCNC: 57 MG/DL — HIGH (ref 7–23)
CALCIUM SERPL-MCNC: 9.1 MG/DL — SIGNIFICANT CHANGE UP (ref 8.4–10.5)
CHLORIDE SERPL-SCNC: 104 MMOL/L — SIGNIFICANT CHANGE UP (ref 96–108)
CO2 SERPL-SCNC: 25 MMOL/L — SIGNIFICANT CHANGE UP (ref 22–31)
CREAT SERPL-MCNC: 1.36 MG/DL — HIGH (ref 0.5–1.3)
EGFR: 53 ML/MIN/1.73M2 — LOW
GLUCOSE SERPL-MCNC: 102 MG/DL — HIGH (ref 70–99)
HCT VFR BLD CALC: 33.4 % — LOW (ref 39–50)
HGB BLD-MCNC: 11.3 G/DL — LOW (ref 13–17)
MAGNESIUM SERPL-MCNC: 2.3 MG/DL — SIGNIFICANT CHANGE UP (ref 1.6–2.6)
MCHC RBC-ENTMCNC: 31 PG — SIGNIFICANT CHANGE UP (ref 27–34)
MCHC RBC-ENTMCNC: 33.8 GM/DL — SIGNIFICANT CHANGE UP (ref 32–36)
MCV RBC AUTO: 91.5 FL — SIGNIFICANT CHANGE UP (ref 80–100)
NRBC # BLD: 0 /100 WBCS — SIGNIFICANT CHANGE UP (ref 0–0)
NT-PROBNP SERPL-SCNC: 2826 PG/ML — HIGH (ref 0–300)
PLATELET # BLD AUTO: 160 K/UL — SIGNIFICANT CHANGE UP (ref 150–400)
POTASSIUM SERPL-MCNC: 4.8 MMOL/L — SIGNIFICANT CHANGE UP (ref 3.5–5.3)
POTASSIUM SERPL-SCNC: 4.8 MMOL/L — SIGNIFICANT CHANGE UP (ref 3.5–5.3)
PROT SERPL-MCNC: 6.8 G/DL — SIGNIFICANT CHANGE UP (ref 6–8.3)
RBC # BLD: 3.65 M/UL — LOW (ref 4.2–5.8)
RBC # FLD: 14.6 % — HIGH (ref 10.3–14.5)
SODIUM SERPL-SCNC: 136 MMOL/L — SIGNIFICANT CHANGE UP (ref 135–145)
WBC # BLD: 5.76 K/UL — SIGNIFICANT CHANGE UP (ref 3.8–10.5)
WBC # FLD AUTO: 5.76 K/UL — SIGNIFICANT CHANGE UP (ref 3.8–10.5)

## 2024-04-08 PROCEDURE — 99222 1ST HOSP IP/OBS MODERATE 55: CPT

## 2024-04-08 PROCEDURE — 99233 SBSQ HOSP IP/OBS HIGH 50: CPT

## 2024-04-08 RX ORDER — SPIRONOLACTONE 25 MG/1
12.5 TABLET, FILM COATED ORAL DAILY
Refills: 0 | Status: DISCONTINUED | OUTPATIENT
Start: 2024-04-08 | End: 2024-04-10

## 2024-04-08 RX ADMIN — Medication 81 MILLIGRAM(S): at 12:06

## 2024-04-08 RX ADMIN — TAMSULOSIN HYDROCHLORIDE 0.4 MILLIGRAM(S): 0.4 CAPSULE ORAL at 21:38

## 2024-04-08 RX ADMIN — MIRTAZAPINE 15 MILLIGRAM(S): 45 TABLET, ORALLY DISINTEGRATING ORAL at 21:38

## 2024-04-08 RX ADMIN — CLOPIDOGREL BISULFATE 75 MILLIGRAM(S): 75 TABLET, FILM COATED ORAL at 12:05

## 2024-04-08 NOTE — CONSULT NOTE ADULT - SUBJECTIVE AND OBJECTIVE BOX
CHIEF COMPLAINT:  Patient is a 78y old  Male who presents with a chief complaint of dizziness and low BP (2024 07:51)    HPI:  79yo male with PMHx of CAD, CHF, BPH, Anxiety, Bipolar d/o, Lung Ca, depression, presents to the ED with dizziness this morning while taking a shower. Pt said it came on suddenly and was able to lower himself to the ground. Did not pass out or LOC. Did not injure himself. Denies cp, sob, palpitations or other complaints. Wife at bedside, also providing history. They closely watch his weight to dose his bumex. Normally he ahs been aroun 168 pounds lately. They noted a couple days ago he was 171 lbs. Bumex was increased from 1 tab BID to 2 tabs AM and 1 tab PM. Pt also took his flomax this morning.  (2024 16:22)    As above, Interviewed, examined, outpatient chart reviewed. Followed in CHF clinic and by Dr. Walden. Known ischemic cardiomyopathy, multivessel CAD. Was urinary a great deal day /prior to symptoms.   No c/p sob.      PMH:   BPH (benign prostatic hyperplasia)    Bipolar disorder    Depression    Anxiety    Umbilical hernia, incarcerated    Inguinal hernia of right side without obstruction or gangrene    Depression    Constipation    Urinary retention    CAD (coronary artery disease)    SCC (squamous cell carcinoma)    Lung mass    Other nonspecific abnormal finding of lung field    LV (left ventricular) mural thrombus    History of inguinal hernia    Severe left ventricular systolic dysfunction    History of CHF (congestive heart failure)        PSH:   Anxiety    Depression    BPH (benign prostatic hyperplasia)    History of arthroscopy of knee    History of tonsillectomy    History of hernia repair    H/O coronary angiogram        FAMILY HISTORY:  FAMILY HISTORY:  Family history of depression (Mother)    No pertinent family history in first degree relatives    FH: CAD (coronary artery disease) (Sibling, Sibling)    Family history of diabetes mellitus (DM) (Sibling)        SOCIAL HISTORY:  Smoking:  no  Alcohol:  Drugs:    ALLERGIES:  No Known Allergies      Home Medications:  Bumex 2 mg oral tablet: 1 tab(s) orally 2 times a day (2024 16:30)  Flomax 0.4 mg oral capsule: 1 cap(s) orally once a day (2024 16:28)  Metoprolol Succinate ER 25 mg oral tablet, extended release: 1 tab(s) orally once a day (2024 16:28)  mirtazapine 15 mg oral tablet: 1 tab(s) orally once a day (at bedtime) (2024 16:29)  PROzac 20 mg oral capsule: 1 cap(s) orally every other day (2024 16:30)  PROzac 40 mg oral capsule: 1 cap(s) orally every other day (2024 16:30)  spironolactone 25 mg oral tablet: 0.5 tab(s) orally once a day (2024 16:30)      MEDICATIONS:  acetaminophen     Tablet .. 650 milliGRAM(s) Oral every 6 hours PRN  aluminum hydroxide/magnesium hydroxide/simethicone Suspension 30 milliLiter(s) Oral every 4 hours PRN  aspirin enteric coated 81 milliGRAM(s) Oral daily  atorvastatin 80 milliGRAM(s) Oral at bedtime  clopidogrel Tablet 75 milliGRAM(s) Oral daily  FLUoxetine 20 milliGRAM(s) Oral daily  melatonin 3 milliGRAM(s) Oral at bedtime PRN  metoprolol succinate ER 25 milliGRAM(s) Oral daily  mirtazapine 15 milliGRAM(s) Oral at bedtime  ondansetron Injectable 4 milliGRAM(s) IV Push every 8 hours PRN  sodium chloride 0.9%. 1000 milliLiter(s) IV Continuous <Continuous>  tamsulosin 0.4 milliGRAM(s) Oral at bedtime      REVIEW OF SYSTEMS:  CONSTITUTIONAL: No fever, weight loss, or fatigue  EYES: No eye pain, visual disturbances, or discharge  ENMT:  No difficulty hearing, tinnitus, vertigo; No sinus or throat pain  NECK: No pain or stiffness  BREASTS: No pain, masses, or nipple discharge  RESPIRATORY: No cough, wheezing, chills or hemoptysis; No shortness of breath  CARDIOVASCULAR: No chest pain, palpitations, dizziness, or leg swelling  GASTROINTESTINAL: No abdominal or epigastric pain. No nausea, vomiting, or hematemesis; No diarrhea or constipation. No melena or hematochezia.  GENITOURINARY: No dysuria, frequency, hematuria, or incontinence  NEUROLOGICAL: No headaches, memory loss, loss of strength, numbness, or tremors  SKIN: No itching, burning, rashes, or lesions   LYMPH NODES: No enlarged glands  ENDOCRINE: No heat or cold intolerance; No hair loss  MUSCULOSKELETAL: No joint pain or swelling; No muscle, back, or extremity pain  PSYCHIATRIC: No depression, anxiety, mood swings, or difficulty sleeping  HEME/LYMPH: No easy bruising, or bleeding gums  ALLERGY AND IMMUNOLOGIC: No hives or eczema    PHYSICAL EXAM:  T(C): 36.3 (24 @ 04:53), Max: 36.7 (24 @ 20:30)  HR: 78 (24 @ 04:53) (70 - 78)  BP: 100/55 (24 @ 04:53) (81/48 - 100/55)  RR: 16 (24 @ 04:53) (15 - 16)  SpO2: 100% (24 @ 04:53) (96% - 100%)  Wt(kg): --    GENERAL: NAD, well-groomed, well-developed  HEAD:  Atraumatic, Normocephalic  EYES: EOMI, conjunctiva and sclera clear  ENT: Moist mucous membranes,  NECK: Supple, No JVD, no bruits  CHEST/LUNG: Clear to ausculation and percussion bilaterally; No rales, rhonchi, wheezing, or rubs  HEART: Regular rate and rhythm; No murmurs, rubs, or gallops PMI non displaced.  ABDOMEN: Soft, Nontender, Nondistended; Bowel sounds present  EXTREMITIES:  2+ Peripheral Pulses, No clubbing, cyanosis, or edema  SKIN: No rashes or lesions  NERVOUS SYSTEM:  Alert & Oriented X3, No focal deficits    Cardiovascular Diagnostic Testing:  ECG:  < from: 12 Lead ECG (24 @ 12:56) >    Ventricular Rate 70 BPM    Atrial Rate 70 BPM    P-R Interval 240 ms    QRS Duration 110 ms    Q-T Interval 450 ms    QTC Calculation(Bazett) 486 ms    P Axis 67 degrees    R Axis -73 degrees    T Axis 88 degrees    Diagnosis Line Sinus rhythm withsinus arrhythmia with 1st degree AV block with occasional and consecutive premature ventricular complexes  Possible Left atrial enlargement  Left axis deviation  Incomplete right bundle branch block  Anteroseptal infarct (cited on or before 11-MAY-2022)  Abnormal ECG  When compared with ECG of 22-DEC-2023 17:48,  No significant change was found  Confirmed by ALBIN COLON, JESSICA TABOR ([id number]) on 2024 7:37:51 AM    < end of copied text >  < from: Xray Chest 1 View- PORTABLE-Urgent (24 @ 13:51) >    ACC: [id number] EXAM:  XR CHEST PORTABLE URGENT 1V   ORDERED BY: KRISTAN VALDES     PROCEDURE DATE:  2024          INTERPRETATION:  AP chest on 2024 at 1:54 PM. 2 images. Patient   has chest pain. 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 Recent workups showed a left upper lobe mass.    Heart likely enlarged.    On 2023 there was a atelectatic process at right base which   is no longer evident.    There is a persistent densearea in the left upper lung field rather   similar to 2023. This has characteristics of a mass on  this year.    IMPRESSION: Persistent mass left upper lung field.    --- End of Report ---            SOL DOMÍNGUEZ MD; Attending Radiologist  This document has been electronically signed. 2024  5:20PM    < end of copied text >  < from: NM PET/CT Onc FDG Skull to Thigh, Inital (24 @ 10:00) >  EXAM: [id number] - PETCT [facility] ONC FDG INIT  - ORDERED BY:  REMA RINCON      PROCEDURE DATE:  2024           INTERPRETATION:  CLINICAL INFORMATION: S/P RUL lobectomy 2022 for   adenocarcinoma; RT left upper lung 3/2023; recent CT chest showed   findings concerning for left upper lobe neoplasm.    TREATMENT STRATEGY EVALUATION: Initial  FASTING BLOOD SUGAR: 70 mg/dL  RADIOPHARMACEUTICAL:  13.2 mCi F-18, FDG, I.V.  UPTAKE PERIOD: 54 minutes  SCANNER: Siemens Biograph mCT 64  ORAL CONTRAST: None  PHARMACOLOGIC INTERVENTION: None.    TECHNIQUE: Following intravenous injection of radiopharmaceutical and   above uptake period, PET/CT was obtained from base of skull  to   mid-thigh. CT protocol was optimized for PET attenuationcorrection and   anatomic localization and was not designed to produce and cannot replace   state-of-the-art diagnostic CT images with specific imaging protocols for   different body parts and indications. Images were reconstructed and   reviewed in axial, coronal and sagittal views and three-dimensional MIP.    The standardized uptake values (SUV) are normalized to patient body   weight and indicate the highest activity concentration (SUVmax) in a   given site. All image numbers refer to axial image number.    COMPARISON:  FDG-PET/CT dated  2023 and 2023.    OTHER STUDIES USED FOR CORRELATION: CT chest 2023.    FINDINGS:    HEAD/NECK: Physiologic FDG activity in visualized brain, head, and neck.    THORAX: No abnormal FDG activity. No concerning hypermetabolic   lymphadenopathy.    LUNGS: Nodular component of the left upper lobe apical segment changes   (image 87) measures 2.7 x 2.3 cm with SUV 3.1; this measured 1.6 x 1.4 cm   with SUV 3.8 on prior PET/CT and measured 2.7 x 2.2 cm on the most recent   CT chest. On the PET/CT in 2023, prior to radiotherapy, this left upper   lobe nodular opacity measured 1.7 x 1.7 cm with SUV 6.4. More elongated   opacity coursing anteriorly with no significant activity appears similar   to the most recent diagnostic CT and was previously thicker with SUV 3.7   on prior PET/CT, findings likely reflective of post radiation process.  -Postsurgical changes in the right upper lung field with no abnormal   activity.  -Inferiorly in the right middle lobe subpleural groundglass opacities,   also seen on the most recent CT chest, exhibit patchy activity of SUV 2.7   (image 135).  -Emphysematous lung changes with scattered scar/atelectasis.    PLEURA: Stable right and increased left pleural effusion since the most   recent CT.    PERICARDIUM: No abnormal FDG activity. No effusion.    HEPATOBILIARY/PANCREAS: Physiologic FDG activity.  For reference, normal   liver demonstrates SUV mean 2.3; previously 3.1.  -Cholelithiasis    SPLEEN: Physiologic FDG activity. Normal in size.    ADRENAL GLANDS: Left adrenal stable nodularity with patchy activity of   SUV 3.0 (previously 3.1).  -Stable mildly thickened right adrenal gland    KIDNEYS/URINARY BLADDER: Physiologic excreted FDG activity. Right renal   cyst    REPRODUCTIVE ORGANS: No abnormal FDG activity.    ABDOMINOPELVIC LYMPH NODES/RETROPERITONEUM: No enlarged or FDG-avid lymph   node.    ESOPHAGUS/STOMACH/BOWEL/PERITONEUM/MESENTERY: No abnormal FDG activity.    VESSELS: Few scattered atherosclerotic changes in the aorta and in the   coronary arteries. No aneurysm.  -Cardiomegaly.    BONES/SOFT TISSUES: No suspicious FDG avid malignant bone lesion.  -Stable postsurgical changes in the region of the right inguinal canal   with persistent increased activity most likely reflecting reactive   changes.    IMPRESSION:  1. Nodular component of the left upper lobe apical segment changes,   similar to the most recent CT chest, has increased in size since the   prior PET/CT in 2023 with persistent increased activity. Increased left   pleural effusion since the most recent CT chest. Left upper lobe nodular   opacity is suspicious for malignant process; can be percutaneously   biopsied medial to the left scapula under CT guidance.    2. Stable postsurgical changes in the right upper lung field with no   abnormal activity; right middle lobe subpleural groundglass opacities   with patchy activity may reflect inflammatory processes. Reassess on   follow-up.    3. No suspicious FDG avid malignant lesion in the remaining field of view.    --- End of Report ---    < end of copied text >  < from: TTE Echo Complete w/o Contrast w/ Doppler (24 @ 11:12) >    ACC: [id number] EXAM:  ECHO TTE WO CON COMP W DOPP                          PROCEDURE DATE:  2024          INTERPRETATION:  TRANSTHORACIC ECHOCARDIOGRAM REPORT        Patient Name:   ELYSE MALAVE Patient Location: [address] [place] [address] Rec #:  [id number]            Accession #:      [id number]  Account #:      [id number]             Height:           70.9 in 180.0 cm  YOB: 1945           Weight:           167.5 lb 76.00 kg  Patient Age:    78 years            BSA:              1.95 m²  Patient Gender: M                   BP:               0/0 mmHg      Date of Exam:        2024 11:12:58 AM  Sonographer:         CHELSY  Referring Physician: LIANET    Procedure:     2D Echo/Doppler/Color Doppler Complete.  Indications:   I50.9 -Heart failure, unspecified  Diagnosis:     I50.9 - Heart failure, unspecified  Study Details: Technically good study.        2D AND M-MODE MEASUREMENTS (normal ranges within parentheses):  Left Ventricle:                  Normal   Aorta/Left Atrium:             Normal  IVSd (2D):              1.33 cm (0.7-1.1) Aortic Root (2D):    3.82 cm   (2.4-3.7)  LVPWd (2D):             1.19 cm (0.7-1.1) Aortic Root (Mmode): 3.89 cm   (2.4-3.7)  LVIDd (2D):             5.81 cm (3.4-5.7) AoV Cusp Separation: 2.22 cm   (1.5-2.6)  LVIDs (2D):             4.54 cm           Left Atrium (2D):    4.24 cm   (1.9-4.0)  LV FS (2D):             21.8 %   (>25%)   Left Atrium (Mmode): 5.14 cm   (1.9-4.0)  LV EF (2D):              43 %    (>55%)  Relative Wall Thickness  0.41    (<0.42)    LV SYSTOLIC FUNCTION BY 2D PLANIMETRY (MOD):  EF-A4C View: 32.9 %    LV DIASTOLIC FUNCTION:  MV Peak E: 0.54 m/s Decel Time: 185 msec  MV Peak A: 0.60 m/s  E/A Ratio: 0.91    SPECTRAL DOPPLER ANALYSIS (where applicable):  Mitral Valve:  MV P1/2 Time: 53.56 msec  MV Area, PHT: 4.11 cm²    Aortic Valve: AoV Max Dave: 1.07 m/s AoV Peak P.6 mmHg AoV Mean PG:   3.0 mmHg    LVOT Vmax: 0.77 m/s LVOT VTI: 0.166 m LVOT Diameter: 2.51 cm    AoV Area, Vmax: 3.57 cm² AoV Area, VTI:3.51 cm² AoV Area, Vmn: 2.91 cm²  Ao VTI: 0.234  Tricuspid Valve and PA/RV Systolic Pressure: TR Max Velocity:  RA   Pressure: 10 mmHg RVSP/PASP: 53.6 mmHg      PHYSICIAN INTERPRETATION:  Left Ventricle: The left ventricular internal cavity size is mildly   increased. Left ventricular wall thickness is increased.  Global LV systolic function was severely decreased. Left ventricular   ejection fraction, by visual estimation, is 25 to 30%. Spectral Doppler   shows impaired relaxation pattern of left ventricular myocardial filling   (Grade I diastolic dysfunction).  Right Ventricle: The right ventricular size is mildly enlarged.  Left Atrium: Left atrial enlargement.  Right Atrium: Right atrial enlargement.  Pericardium: There is no evidence of pericardial effusion.  Mitral Valve: Mobility is mildly decreased for both leaflets. Mild to   moderate mitral valve regurgitation is seen.  Tricuspid Valve: Moderate tricuspid regurgitation is visualized.  Aortic Valve: The aortic valve is trileaflet. No evidence of aortic   stenosis. Mild aortic valve regurgitation is seen.  Pulmonic Valve: Structurally normal pulmonic valve, with normal leaflet   excursion. Mild pulmonic valve regurgitation.  Aorta: The aortic root and ascending aorta are structurally normal, with   no evidence of dilitation. The aortic arch was not well visualized.  Pulmonary Artery: The main pulmonary artery is normal in size.  Venous: The inferior vena cava was normal sized, with respiratory size   variation greater than50%.      Summary:   1. Left ventricular ejection fraction, by visual estimation, is 25 to   30%.   2. Severely decreased global left ventricular systolic function.   3. Left atrial enlargement.   4. Increased LV wall thickness.   5. Spectral Dopplershows impaired relaxation pattern of left   ventricular myocardial filling (Grade I diastolic dysfunction).   6. There is mild concentric left ventricular hypertrophy.   7. Mildly enlarged right ventricle.   8. Right atrial enlargement.   9. Mild to moderate mitral valve regurgitation.  10. Moderate tricuspid regurgitation.  11. Mild aortic regurgitation.  12. Mild pulmonic valve regurgitation.    [id number] Jessica Ardon MD, FACC , MD, FACC Electronically   signed on 2024 at 1:16:21 PM      < end of copied text >      LABS:                        11.3   5.76  )-----------( 160      ( 2024 07:10 )             33.4         136  |  104  |  57<H>  ----------------------------<  102<H>  4.8   |  25  |  1.36<H>    Ca    9.1      2024 07:10  Mg     2.3         TPro  6.8  /  Alb  2.7<L>  /  TBili  0.8  /  DBili  x   /  AST  41<H>  /  ALT  29  /  AlkPhos  193<H>                    Troponin I, High Sensitivity Result: 76.8 ng/L (24 @ 12:55)  Troponin I, High Sensitivity Result: 71.9 ng/L (24 @ 13:55)      Telemetry:    IMAGING:

## 2024-04-08 NOTE — PROGRESS NOTE ADULT - SUBJECTIVE AND OBJECTIVE BOX
Time of Visit:  Patient seen and examined. pat is lying in bed comfortable     MEDICATIONS  (STANDING):  aspirin enteric coated 81 milliGRAM(s) Oral daily  atorvastatin 80 milliGRAM(s) Oral at bedtime  clopidogrel Tablet 75 milliGRAM(s) Oral daily  FLUoxetine 20 milliGRAM(s) Oral daily  mirtazapine 15 milliGRAM(s) Oral at bedtime  spironolactone 12.5 milliGRAM(s) Oral daily  tamsulosin 0.4 milliGRAM(s) Oral at bedtime      MEDICATIONS  (PRN):  acetaminophen     Tablet .. 650 milliGRAM(s) Oral every 6 hours PRN Mild Pain (1 - 3), Moderate Pain (4 - 6), Severe Pain (7 - 10)  aluminum hydroxide/magnesium hydroxide/simethicone Suspension 30 milliLiter(s) Oral every 4 hours PRN Dyspepsia  melatonin 3 milliGRAM(s) Oral at bedtime PRN Insomnia  ondansetron Injectable 4 milliGRAM(s) IV Push every 8 hours PRN Nausea and/or Vomiting       Medications up to date at time of exam.      PHYSICAL EXAMINATION:  Patient has no new complaints.  GENERAL: The patient  in no apparent distress.     Vital Signs Last 24 Hrs  T(C): 36.7 (08 Apr 2024 13:55), Max: 36.7 (07 Apr 2024 20:30)  T(F): 98 (08 Apr 2024 13:55), Max: 98 (07 Apr 2024 20:30)  HR: 81 (08 Apr 2024 13:55) (70 - 81)  BP: 96/58 (08 Apr 2024 13:55) (90/50 - 100/55)  BP(mean): --  RR: 14 (08 Apr 2024 13:55) (14 - 16)  SpO2: 97% (08 Apr 2024 13:55) (97% - 100%)    Parameters below as of 08 Apr 2024 13:55  Patient On (Oxygen Delivery Method): room air       (if applicable)    Chest Tube (if applicable)    HEENT: Head is normocephalic and atraumatic. Extraocular muscles are intact. Mucous membranes are moist.     NECK: Supple, no palpable adenopathy.    LUNGS: Fair bilateral berath sounds  no wheezing, rales, or rhonchi.    HEART: Regular rate and rhythm without murmur.    ABDOMEN: Soft, nontender, and nondistended.  No hepatosplenomegaly is noted.    : No painful voiding, no pelvic pain    EXTREMITIES: Without any cyanosis, clubbing, rash, lesions or edema.    NEUROLOGIC: Awake, alert, oriented, grossly intact    SKIN: Warm, dry, good turgor.      LABS:                        11.3   5.76  )-----------( 160      ( 08 Apr 2024 07:10 )             33.4     04-08    136  |  104  |  57<H>  ----------------------------<  102<H>  4.8   |  25  |  1.36<H>    Ca    9.1      08 Apr 2024 07:10  Mg     2.3     04-08    TPro  6.8  /  Alb  2.7<L>  /  TBili  0.8  /  DBili  x   /  AST  41<H>  /  ALT  29  /  AlkPhos  193<H>  04-08      Urinalysis Basic - ( 08 Apr 2024 07:10 )    Color: x / Appearance: x / SG: x / pH: x  Gluc: 102 mg/dL / Ketone: x  / Bili: x / Urobili: x   Blood: x / Protein: x / Nitrite: x   Leuk Esterase: x / RBC: x / WBC x   Sq Epi: x / Non Sq Epi: x / Bacteria: x      MICROBIOLOGY: (if applicable)    RADIOLOGY & ADDITIONAL STUDIES:  EKG:   CXR:  ECHO:    IMPRESSION: 78y Male PAST MEDICAL & SURGICAL HISTORY:  BPH (benign prostatic hyperplasia)      Bipolar disorder      Depression      Anxiety      Umbilical hernia, incarcerated      Depression      Constipation      Urinary retention      CAD (coronary artery disease)      SCC (squamous cell carcinoma)      Lung mass      Other nonspecific abnormal finding of lung field      LV (left ventricular) mural thrombus      History of inguinal hernia      History of CHF (congestive heart failure)      History of arthroscopy of knee  Right, 1987      History of tonsillectomy  1952      History of hernia repair      H/O coronary angiogram       p/w         IMP: This is a 78 yr old man  CAD, CHF, BPH, Anxiety, Bipolar d/o, Lung Ca, depression, presents to the ED with dizziness this morning while taking a shower. CT chest with HARRY nodule . Hypotension due to combination of multiple meds       Sugg    - Continue syncope work up   - Tele monitoring   - Duoneb q6h prn  ( pat is not using any BD inhalers at present )   - Fall precaution   - Monitor renal fx   - Out pat pulmo f/u     spoke with wife Kenia at bedside

## 2024-04-08 NOTE — PROGRESS NOTE ADULT - SUBJECTIVE AND OBJECTIVE BOX
Patient is a 78y old  Male who presents with a chief complaint of dizziness and low BP (08 Apr 2024 07:51)      Patient seen and examined at bedside. No overnight events reported. Patient states he is feeling better     ALLERGIES:  No Known Allergies    MEDICATIONS  (STANDING):  aspirin enteric coated 81 milliGRAM(s) Oral daily  atorvastatin 80 milliGRAM(s) Oral at bedtime  clopidogrel Tablet 75 milliGRAM(s) Oral daily  FLUoxetine 20 milliGRAM(s) Oral daily  metoprolol succinate ER 25 milliGRAM(s) Oral daily  mirtazapine 15 milliGRAM(s) Oral at bedtime  sodium chloride 0.9%. 1000 milliLiter(s) (75 mL/Hr) IV Continuous <Continuous>  tamsulosin 0.4 milliGRAM(s) Oral at bedtime    MEDICATIONS  (PRN):  acetaminophen     Tablet .. 650 milliGRAM(s) Oral every 6 hours PRN Mild Pain (1 - 3), Moderate Pain (4 - 6), Severe Pain (7 - 10)  aluminum hydroxide/magnesium hydroxide/simethicone Suspension 30 milliLiter(s) Oral every 4 hours PRN Dyspepsia  melatonin 3 milliGRAM(s) Oral at bedtime PRN Insomnia  ondansetron Injectable 4 milliGRAM(s) IV Push every 8 hours PRN Nausea and/or Vomiting    Vital Signs Last 24 Hrs  T(F): 97.4 (08 Apr 2024 04:53), Max: 98 (07 Apr 2024 20:30)  HR: 78 (08 Apr 2024 04:53) (70 - 78)  BP: 100/55 (08 Apr 2024 04:53) (81/48 - 100/55)  RR: 16 (08 Apr 2024 04:53) (15 - 16)  SpO2: 100% (08 Apr 2024 04:53) (96% - 100%)  I&O's Summary    PHYSICAL EXAM:  General: NAD, A/O x 3  ENT: No gross hearing impairment, Moist mucous membranes, no thrush  Neck: Supple, No JVD  Lungs: Clear to auscultation bilaterally, good air entry, non-labored breathing  Cardio: RRR, S1/S2, No murmur  Abdomen: Soft, Nontender, Nondistended; Bowel sounds present  Extremities: No calf tenderness, No cyanosis, No pitting edema  Psych: Appropriate mood and affect    LABS:                        11.3   5.76  )-----------( 160      ( 08 Apr 2024 07:10 )             33.4     04-08    136  |  104  |  57  ----------------------------<  102  4.8   |  25  |  1.36    Ca    9.1      08 Apr 2024 07:10  Mg     2.3     04-08    TPro  6.8  /  Alb  2.7  /  TBili  0.8  /  DBili  x   /  AST  41  /  ALT  29  /  AlkPhos  193  04-08                CARDIAC MARKERS ( 06 Apr 2024 13:55 )  x     / 71.9 ng/L / x     / x     / x      CARDIAC MARKERS ( 06 Apr 2024 12:55 )  x     / 76.8 ng/L / x     / x     / x                            Urinalysis Basic - ( 08 Apr 2024 07:10 )    Color: x / Appearance: x / SG: x / pH: x  Gluc: 102 mg/dL / Ketone: x  / Bili: x / Urobili: x   Blood: x / Protein: x / Nitrite: x   Leuk Esterase: x / RBC: x / WBC x   Sq Epi: x / Non Sq Epi: x / Bacteria: x          RADIOLOGY & ADDITIONAL TESTS:    Care Discussed with Consultants/Other Providers:

## 2024-04-08 NOTE — CONSULT NOTE ADULT - ASSESSMENT
syncope;/near syncope due to hypotension, likely volume depletion in patient with known ischemic cardiomyopathy, also presumed malignancy.  hany improving      Suggest  continue b blocker  could resume MRA/aldactone p bnp rising  use loop diuretic prn weight gain more than 3 lb/day  statin for cad  antiplatelet rx to continue/cad prior mi  outpatient follow up with CHF clinic and Dr. Walden, Cardiology

## 2024-04-08 NOTE — PROGRESS NOTE ADULT - ASSESSMENT
77 y/o M with PMHx of CAD, CHF, BPH, Anxiety, Bipolar d/o, Lung Ca, depression being admitted for acute on CKD and dizziness.    #Pre-syncope  #Acute on CKD3 - improving  #Hypotension  - Likely 2/2 to overdiuresis at home with bumex  - Gentle IVFs x 12 hrs overnight. monitor for fluid overload  - Renal function improving, cont to monitor  - Bumex, aldactone held  - Follow orthostatics  - PT/OT pending   - Cardiology consult pending     #HFrEF (EF 25-30%)  #CAD  - Hold diuretics for now  - I/Os, daily weight  - Echo: EF 25 to 30%. Severely decreased global left ventricular systolic function.  - continue ASA/Plavix  - Toprol 25mg daily with hold parameters - did not receive 2/2 parameters    #BPH  - Continue flomax 0.4mg daily (wife had recently cut from 2 tabs to 1 tab 2/2 dizziness)    #HLD  - Continue lipitor 80mg qhs  - Follow lipid panel    #Depression  - Continue prozac 20mg daily, remeron at bedtime    #Lung Cancer  - Recent CT chest showed concerning lesion  - Will have f/u PET scan with their oncologist, per wife    #Hyponatremia  - Improved    Dispo: PT pending, pending course     4/8: Patient prefers to update wife 77 y/o M with PMHx of CAD, CHF, BPH, Anxiety, Bipolar d/o, Lung Ca, depression being admitted for acute on CKD and dizziness.    #Pre-syncope  #Acute on CKD3 - improving  #Hypotension  - Likely 2/2 to overdiuresis at home with bumex  - Gentle IVFs x 12 hrs overnight. monitor for fluid overload  - Renal function improving, cont to monitor  - Bumex, aldactone held  - Follow orthostatics  - PT/OT pending   - Cardiology consult pending     #HFrEF (EF 25-30%)  #CAD  - Hold diuretics for now  - I/Os, daily weight  - Echo: EF 25 to 30%. Severely decreased global left ventricular systolic function.  - continue ASA/Plavix  - Toprol 25mg daily with hold parameters - did not receive 2/2 parameters    #BPH  - Continue flomax 0.4mg daily (wife had recently cut from 2 tabs to 1 tab 2/2 dizziness)    #Transaminitis   - improving  - follow up labs     #HLD  - Continue lipitor 80mg qhs  - Follow lipid panel    #Depression  - Continue prozac 20mg daily, remeron at bedtime    #Lung Cancer  - Recent CT chest showed concerning lesion  - Will have f/u PET scan with their oncologist, per wife    #Hyponatremia  - Improved    Dispo: PT pending, pending course     4/8: Patient prefers to update wife 79 y/o M with PMHx of CAD, CHF, BPH, Anxiety, Bipolar d/o, Lung Ca, depression being admitted for acute on CKD and dizziness.    #Pre-syncope  #Acute on CKD3 - improving  #Hypotension  - Likely 2/2 to overdiuresis at home with bumex  - Gentle IVFs x 12 hrs overnight. monitor for fluid overload  - Renal function improving, cont to monitor  - Bumex, aldactone held  - Follow orthostatics  - PT/OT pending   - Cardiology consult pending     #HFrEF (EF 25-30%)  #CAD  - Hold diuretics for now  - I/Os, daily weight  - Echo: EF 25 to 30%. Severely decreased global left ventricular systolic function.  - continue ASA/Plavix  - Toprol 25mg daily with hold parameters - did not receive 2/2 parameters    #BPH  - Continue flomax 0.4mg daily (wife had recently cut from 2 tabs to 1 tab 2/2 dizziness)    #Transaminitis   - improving  - follow up labs     #HLD  - Continue lipitor 80mg qhs  - Follow lipid panel    #Depression  - Continue prozac 20mg daily, remeron at bedtime    #Lung Cancer  - Recent CT chest showed concerning lesion  - Will have f/u PET scan with their oncologist, per wife    #Hyponatremia  - Improved    Dispo: PT pending, pending course     4/8: Updated patient and wife at bedside

## 2024-04-09 LAB
ANION GAP SERPL CALC-SCNC: 8 MMOL/L — SIGNIFICANT CHANGE UP (ref 5–17)
BUN SERPL-MCNC: 46 MG/DL — HIGH (ref 7–23)
CALCIUM SERPL-MCNC: 9.4 MG/DL — SIGNIFICANT CHANGE UP (ref 8.4–10.5)
CHLORIDE SERPL-SCNC: 101 MMOL/L — SIGNIFICANT CHANGE UP (ref 96–108)
CO2 SERPL-SCNC: 26 MMOL/L — SIGNIFICANT CHANGE UP (ref 22–31)
CREAT SERPL-MCNC: 1.33 MG/DL — HIGH (ref 0.5–1.3)
EGFR: 55 ML/MIN/1.73M2 — LOW
GLUCOSE SERPL-MCNC: 144 MG/DL — HIGH (ref 70–99)
HCT VFR BLD CALC: 35.4 % — LOW (ref 39–50)
HGB BLD-MCNC: 11.8 G/DL — LOW (ref 13–17)
MCHC RBC-ENTMCNC: 30.8 PG — SIGNIFICANT CHANGE UP (ref 27–34)
MCHC RBC-ENTMCNC: 33.3 GM/DL — SIGNIFICANT CHANGE UP (ref 32–36)
MCV RBC AUTO: 92.4 FL — SIGNIFICANT CHANGE UP (ref 80–100)
NRBC # BLD: 0 /100 WBCS — SIGNIFICANT CHANGE UP (ref 0–0)
NT-PROBNP SERPL-SCNC: 4219 PG/ML — HIGH (ref 0–300)
PLATELET # BLD AUTO: 173 K/UL — SIGNIFICANT CHANGE UP (ref 150–400)
POTASSIUM SERPL-MCNC: 4.7 MMOL/L — SIGNIFICANT CHANGE UP (ref 3.5–5.3)
POTASSIUM SERPL-SCNC: 4.7 MMOL/L — SIGNIFICANT CHANGE UP (ref 3.5–5.3)
RBC # BLD: 3.83 M/UL — LOW (ref 4.2–5.8)
RBC # FLD: 14.6 % — HIGH (ref 10.3–14.5)
SODIUM SERPL-SCNC: 135 MMOL/L — SIGNIFICANT CHANGE UP (ref 135–145)
WBC # BLD: 5.88 K/UL — SIGNIFICANT CHANGE UP (ref 3.8–10.5)
WBC # FLD AUTO: 5.88 K/UL — SIGNIFICANT CHANGE UP (ref 3.8–10.5)

## 2024-04-09 PROCEDURE — 99233 SBSQ HOSP IP/OBS HIGH 50: CPT

## 2024-04-09 RX ORDER — ENOXAPARIN SODIUM 100 MG/ML
40 INJECTION SUBCUTANEOUS EVERY 24 HOURS
Refills: 0 | Status: DISCONTINUED | OUTPATIENT
Start: 2024-04-09 | End: 2024-04-10

## 2024-04-09 RX ORDER — BUMETANIDE 0.25 MG/ML
1 INJECTION INTRAMUSCULAR; INTRAVENOUS
Refills: 0 | Status: DISCONTINUED | OUTPATIENT
Start: 2024-04-09 | End: 2024-04-10

## 2024-04-09 RX ORDER — ENOXAPARIN SODIUM 100 MG/ML
30 INJECTION SUBCUTANEOUS EVERY 24 HOURS
Refills: 0 | Status: DISCONTINUED | OUTPATIENT
Start: 2024-04-09 | End: 2024-04-09

## 2024-04-09 RX ADMIN — BUMETANIDE 1 MILLIGRAM(S): 0.25 INJECTION INTRAMUSCULAR; INTRAVENOUS at 14:27

## 2024-04-09 RX ADMIN — SPIRONOLACTONE 12.5 MILLIGRAM(S): 25 TABLET, FILM COATED ORAL at 05:45

## 2024-04-09 RX ADMIN — ENOXAPARIN SODIUM 40 MILLIGRAM(S): 100 INJECTION SUBCUTANEOUS at 12:21

## 2024-04-09 RX ADMIN — TAMSULOSIN HYDROCHLORIDE 0.4 MILLIGRAM(S): 0.4 CAPSULE ORAL at 22:36

## 2024-04-09 RX ADMIN — MIRTAZAPINE 15 MILLIGRAM(S): 45 TABLET, ORALLY DISINTEGRATING ORAL at 22:35

## 2024-04-09 RX ADMIN — CLOPIDOGREL BISULFATE 75 MILLIGRAM(S): 75 TABLET, FILM COATED ORAL at 12:21

## 2024-04-09 RX ADMIN — Medication 81 MILLIGRAM(S): at 12:21

## 2024-04-09 NOTE — PROGRESS NOTE ADULT - SUBJECTIVE AND OBJECTIVE BOX
Patient is a 78y old  Male who presents with a chief complaint of dizziness and low BP (09 Apr 2024 07:52)      Patient seen and examined at bedside. No overnight events reported. Patient seen moving about in room.     ALLERGIES:  No Known Allergies    MEDICATIONS  (STANDING):  aspirin enteric coated 81 milliGRAM(s) Oral daily  atorvastatin 80 milliGRAM(s) Oral at bedtime  clopidogrel Tablet 75 milliGRAM(s) Oral daily  FLUoxetine 20 milliGRAM(s) Oral daily  mirtazapine 15 milliGRAM(s) Oral at bedtime  spironolactone 12.5 milliGRAM(s) Oral daily  tamsulosin 0.4 milliGRAM(s) Oral at bedtime    MEDICATIONS  (PRN):  acetaminophen     Tablet .. 650 milliGRAM(s) Oral every 6 hours PRN Mild Pain (1 - 3), Moderate Pain (4 - 6), Severe Pain (7 - 10)  aluminum hydroxide/magnesium hydroxide/simethicone Suspension 30 milliLiter(s) Oral every 4 hours PRN Dyspepsia  melatonin 3 milliGRAM(s) Oral at bedtime PRN Insomnia  ondansetron Injectable 4 milliGRAM(s) IV Push every 8 hours PRN Nausea and/or Vomiting    Vital Signs Last 24 Hrs  T(F): 98 (09 Apr 2024 05:00), Max: 98 (08 Apr 2024 13:55)  HR: 69 (09 Apr 2024 09:00) (69 - 83)  BP: 99/50 (09 Apr 2024 09:00) (96/58 - 100/56)  RR: 14 (09 Apr 2024 05:00) (14 - 17)  SpO2: 98% (09 Apr 2024 05:00) (97% - 99%)  I&O's Summary    08 Apr 2024 07:01  -  09 Apr 2024 07:00  --------------------------------------------------------  IN: 0 mL / OUT: 700 mL / NET: -700 mL      PHYSICAL EXAM:  General: NAD, A/O x 3  ENT: No gross hearing impairment, Moist mucous membranes, no thrush  Neck: Supple, No JVD  Lungs: Clear to auscultation bilaterally, good air entry, non-labored breathing  Cardio: RRR, S1/S2, No murmur  Abdomen: Soft, Nontender, Nondistended; Bowel sounds present  Extremities: No calf tenderness, No cyanosis, No pitting edema  Psych: Appropriate mood and affect    LABS:                        11.8   5.88  )-----------( 173      ( 09 Apr 2024 07:23 )             35.4     04-09    135  |  101  |  46  ----------------------------<  144  4.7   |  26  |  1.33    Ca    9.4      09 Apr 2024 07:23  Mg     2.3     04-08    TPro  6.8  /  Alb  2.7  /  TBili  0.8  /  DBili  x   /  AST  41  /  ALT  29  /  AlkPhos  193  04-08                CARDIAC MARKERS ( 06 Apr 2024 13:55 )  x     / 71.9 ng/L / x     / x     / x      CARDIAC MARKERS ( 06 Apr 2024 12:55 )  x     / 76.8 ng/L / x     / x     / x                            Urinalysis Basic - ( 09 Apr 2024 07:23 )    Color: x / Appearance: x / SG: x / pH: x  Gluc: 144 mg/dL / Ketone: x  / Bili: x / Urobili: x   Blood: x / Protein: x / Nitrite: x   Leuk Esterase: x / RBC: x / WBC x   Sq Epi: x / Non Sq Epi: x / Bacteria: x          RADIOLOGY & ADDITIONAL TESTS:    Care Discussed with Consultants/Other Providers:

## 2024-04-09 NOTE — PROGRESS NOTE ADULT - ASSESSMENT
77 y/o M with PMHx of CAD, CHF, BPH, Anxiety, Bipolar d/o, Lung Ca, depression being admitted for acute on CKD and dizziness.    #Pre-syncope  #Acute on CKD3 - improving  #Hypotension  - Likely 2/2 to overdiuresis at home with bumex  - Renal function improving, cont to monitor  - started aldactone 12.5mg with hold parameters   - Follow orthostatics  - tolerating diet   - PT pending   - Cardiology consult recs appreciated     #HFrEF (EF 25-30%)  #CAD  - aldactone   - I/Os, daily weight  - Echo: EF 25 to 30%. Severely decreased global left ventricular systolic function.  - continue ASA/Plavix  - d/c toprol due to patient not receiving. 2/2 hold parameters      #BPH  - Continue flomax 0.4mg daily (wife had recently cut from 2 tabs to 1 tab 2/2 dizziness)    #Transaminitis   - improving  - follow up labs     #HLD  - Continue lipitor 80mg qhs  - Follow lipid panel    #Depression  - Continue prozac 20mg daily, remeron at bedtime    #Lung Cancer  - Recent CT chest showed concerning lesion  - Will have f/u PET scan with their oncologist, per wife    #Hyponatremia  - Improved    Dispo: PT pending, pending course     4/9: Updated patient and wife at bedside  79 y/o M with PMHx of CAD, CHF, BPH, Anxiety, Bipolar d/o, Lung Ca, depression being admitted for acute on CKD and dizziness.    #Pre-syncope  #Acute on CKD3 - improving  #Hypotension  - Likely 2/2 to overdiuresis at home with bumex  - Renal function improving, cont to monitor  - started aldactone 12.5mg with hold parameters   - Follow orthostatics  - tolerating diet   - PT pending   - Cardiology consult recs appreciated     #HFrEF (EF 25-30%)  #CAD  - aldactone   - I/Os, daily weight  - Echo: EF 25 to 30%. Severely decreased global left ventricular systolic function.  - continue ASA/Plavix  - d/c toprol due to patient not receiving. 2/2 hold parameters      #BPH  - Continue flomax 0.4mg daily (wife had recently cut from 2 tabs to 1 tab 2/2 dizziness)    #Transaminitis   - improving  - follow up labs     #HLD  - Continue lipitor 80mg qhs  - Follow lipid panel    #Depression  - Continue prozac 20mg daily, remeron at bedtime    #Lung Cancer  - Recent CT chest showed concerning lesion  - Will have f/u PET scan with their oncologist, per wife    #Hyponatremia  - Improved    #DVTppx:  - lovenox     Dispo: PT pending, pending course     4/9: Updated patient and wife at bedside  77 y/o M with PMHx of CAD, CHF, BPH, Anxiety, Bipolar d/o, Lung Ca, depression being admitted for acute on CKD and dizziness.    #Pre-syncope  #Acute on CKD3 - improving  #Hypotension  - Likely 2/2 to overdiuresis at home with bumex  - Renal function improving, cont to monitor  - started aldactone 12.5mg with hold parameters   - Follow orthostatics  - tolerating diet   - PT pending   - Cardiology consult recs appreciated     #HFrEF (EF 25-30%)  #CAD  - aldactone   - I/Os, daily weight  - Echo: EF 25 to 30%. Severely decreased global left ventricular systolic function.  - continue ASA/Plavix  - added on bumex, 1mg every other day   - d/c Toprol due to patient not receiving. 2/2 hold parameters      #BPH  - Continue flomax 0.4mg daily (wife had recently cut from 2 tabs to 1 tab 2/2 dizziness)    #Transaminitis   - improving  - follow up labs     #HLD  - Continue lipitor 80mg qhs  - Follow lipid panel    #Depression  - Continue prozac 20mg daily, remeron at bedtime    #Lung Cancer  - Recent CT chest showed concerning lesion  - Will have f/u PET scan with their oncologist, per wife    #Hyponatremia  - Improved    #DVTppx:  - lovenox     Dispo: PT pending, pending course     4/9: Updated patient and wife at bedside

## 2024-04-10 ENCOUNTER — TRANSCRIPTION ENCOUNTER (OUTPATIENT)
Age: 79
End: 2024-04-10

## 2024-04-10 VITALS
HEART RATE: 88 BPM | TEMPERATURE: 98 F | OXYGEN SATURATION: 99 % | RESPIRATION RATE: 16 BRPM | SYSTOLIC BLOOD PRESSURE: 92 MMHG | DIASTOLIC BLOOD PRESSURE: 50 MMHG

## 2024-04-10 LAB
ANION GAP SERPL CALC-SCNC: 8 MMOL/L — SIGNIFICANT CHANGE UP (ref 5–17)
BUN SERPL-MCNC: 45 MG/DL — HIGH (ref 7–23)
CALCIUM SERPL-MCNC: 8.9 MG/DL — SIGNIFICANT CHANGE UP (ref 8.4–10.5)
CHLORIDE SERPL-SCNC: 102 MMOL/L — SIGNIFICANT CHANGE UP (ref 96–108)
CO2 SERPL-SCNC: 25 MMOL/L — SIGNIFICANT CHANGE UP (ref 22–31)
CREAT SERPL-MCNC: 1.44 MG/DL — HIGH (ref 0.5–1.3)
EGFR: 50 ML/MIN/1.73M2 — LOW
GLUCOSE SERPL-MCNC: 94 MG/DL — SIGNIFICANT CHANGE UP (ref 70–99)
HCT VFR BLD CALC: 31.6 % — LOW (ref 39–50)
HGB BLD-MCNC: 10.9 G/DL — LOW (ref 13–17)
MCHC RBC-ENTMCNC: 31 PG — SIGNIFICANT CHANGE UP (ref 27–34)
MCHC RBC-ENTMCNC: 34.5 GM/DL — SIGNIFICANT CHANGE UP (ref 32–36)
MCV RBC AUTO: 89.8 FL — SIGNIFICANT CHANGE UP (ref 80–100)
NRBC # BLD: 0 /100 WBCS — SIGNIFICANT CHANGE UP (ref 0–0)
NT-PROBNP SERPL-SCNC: 4956 PG/ML — HIGH (ref 0–300)
PLATELET # BLD AUTO: 174 K/UL — SIGNIFICANT CHANGE UP (ref 150–400)
POTASSIUM SERPL-MCNC: 4.5 MMOL/L — SIGNIFICANT CHANGE UP (ref 3.5–5.3)
POTASSIUM SERPL-SCNC: 4.5 MMOL/L — SIGNIFICANT CHANGE UP (ref 3.5–5.3)
RBC # BLD: 3.52 M/UL — LOW (ref 4.2–5.8)
RBC # FLD: 14.3 % — SIGNIFICANT CHANGE UP (ref 10.3–14.5)
SODIUM SERPL-SCNC: 135 MMOL/L — SIGNIFICANT CHANGE UP (ref 135–145)
WBC # BLD: 5.79 K/UL — SIGNIFICANT CHANGE UP (ref 3.8–10.5)
WBC # FLD AUTO: 5.79 K/UL — SIGNIFICANT CHANGE UP (ref 3.8–10.5)

## 2024-04-10 PROCEDURE — 84484 ASSAY OF TROPONIN QUANT: CPT

## 2024-04-10 PROCEDURE — 99285 EMERGENCY DEPT VISIT HI MDM: CPT | Mod: 25

## 2024-04-10 PROCEDURE — 83735 ASSAY OF MAGNESIUM: CPT

## 2024-04-10 PROCEDURE — 85025 COMPLETE CBC W/AUTO DIFF WBC: CPT

## 2024-04-10 PROCEDURE — 93306 TTE W/DOPPLER COMPLETE: CPT

## 2024-04-10 PROCEDURE — 36415 COLL VENOUS BLD VENIPUNCTURE: CPT

## 2024-04-10 PROCEDURE — 71045 X-RAY EXAM CHEST 1 VIEW: CPT

## 2024-04-10 PROCEDURE — 93005 ELECTROCARDIOGRAM TRACING: CPT

## 2024-04-10 PROCEDURE — 83880 ASSAY OF NATRIURETIC PEPTIDE: CPT

## 2024-04-10 PROCEDURE — 80048 BASIC METABOLIC PNL TOTAL CA: CPT

## 2024-04-10 PROCEDURE — 99239 HOSP IP/OBS DSCHRG MGMT >30: CPT

## 2024-04-10 PROCEDURE — 85027 COMPLETE CBC AUTOMATED: CPT

## 2024-04-10 PROCEDURE — 80053 COMPREHEN METABOLIC PANEL: CPT

## 2024-04-10 RX ORDER — BUMETANIDE 0.25 MG/ML
1 INJECTION INTRAMUSCULAR; INTRAVENOUS
Refills: 0 | DISCHARGE

## 2024-04-10 RX ORDER — SPIRONOLACTONE 25 MG/1
0.5 TABLET, FILM COATED ORAL
Refills: 0 | DISCHARGE

## 2024-04-10 RX ORDER — FLUOXETINE HCL 10 MG
1 CAPSULE ORAL
Refills: 0 | DISCHARGE
Start: 2024-04-10

## 2024-04-10 RX ORDER — BUMETANIDE 0.25 MG/ML
1 INJECTION INTRAMUSCULAR; INTRAVENOUS
Qty: 15 | Refills: 0
Start: 2024-04-10 | End: 2024-05-09

## 2024-04-10 RX ORDER — MIRTAZAPINE 45 MG/1
1 TABLET, ORALLY DISINTEGRATING ORAL
Qty: 0 | Refills: 0 | DISCHARGE
Start: 2024-04-10

## 2024-04-10 RX ORDER — CLOPIDOGREL BISULFATE 75 MG/1
1 TABLET, FILM COATED ORAL
Qty: 0 | Refills: 0 | DISCHARGE
Start: 2024-04-10

## 2024-04-10 RX ORDER — SPIRONOLACTONE 25 MG/1
0.5 TABLET, FILM COATED ORAL
Qty: 0 | Refills: 0 | DISCHARGE
Start: 2024-04-10

## 2024-04-10 RX ORDER — ASPIRIN/CALCIUM CARB/MAGNESIUM 324 MG
1 TABLET ORAL
Qty: 0 | Refills: 0 | DISCHARGE
Start: 2024-04-10

## 2024-04-10 RX ORDER — BUMETANIDE 0.25 MG/ML
1 INJECTION INTRAMUSCULAR; INTRAVENOUS
Qty: 15 | Refills: 0 | DISCHARGE
Start: 2024-04-10 | End: 2024-05-09

## 2024-04-10 RX ADMIN — ENOXAPARIN SODIUM 40 MILLIGRAM(S): 100 INJECTION SUBCUTANEOUS at 11:39

## 2024-04-10 RX ADMIN — CLOPIDOGREL BISULFATE 75 MILLIGRAM(S): 75 TABLET, FILM COATED ORAL at 11:39

## 2024-04-10 RX ADMIN — Medication 20 MILLIGRAM(S): at 11:39

## 2024-04-10 RX ADMIN — Medication 81 MILLIGRAM(S): at 11:39

## 2024-04-10 RX ADMIN — SPIRONOLACTONE 12.5 MILLIGRAM(S): 25 TABLET, FILM COATED ORAL at 05:02

## 2024-04-10 NOTE — DISCHARGE NOTE PROVIDER - NSDCFUSCHEDAPPT_GEN_ALL_CORE_FT
Pankaj Ochoa  [facility] Physician [facility]  [facility] [address]  Scheduled Appointment: 04/17/2024    Mayelin Burden  [facility] Physician [facility]  [facility]  Scheduled Appointment: 04/23/2024    aJy Shell  [facility] Physician [facility]  [facility] [address]  Scheduled Appointment: 04/29/2024    Delroy Boston  [facility] [address]  Scheduled Appointment: 05/02/2024     Pankaj Ochoa  [facility] Physician [facility]  [facility] [address]  Scheduled Appointment: 04/17/2024    Delroy Boston  [address]  Scheduled Appointment: 04/19/2024    Mayelin Burden  [facility]  [facility]  Scheduled Appointment: 04/23/2024    Jay Shell  [facility]  [facility] [address]  Scheduled Appointment: 04/29/2024    Delroy Boston  [facility] [address]  Scheduled Appointment: 05/02/2024

## 2024-04-10 NOTE — DISCHARGE NOTE PROVIDER - NSDCMRMEDTOKEN_GEN_ALL_CORE_FT
aspirin 81 mg oral capsule: 1 cap(s) orally once a day   Bumex 2 mg oral tablet: 1 tab(s) orally 2 times a day  Flomax 0.4 mg oral capsule: 1 cap(s) orally once a day  Metoprolol Succinate ER 25 mg oral tablet, extended release: 1 tab(s) orally once a day  mirtazapine 15 mg oral tablet: 1 tab(s) orally once a day (at bedtime)  Plavix 75 mg oral tablet: 1 tab(s) orally once a day   PROzac 20 mg oral capsule: 1 cap(s) orally every other day  PROzac 40 mg oral capsule: 1 cap(s) orally every other day  rosuvastatin 20 mg oral tablet: 1 tab(s) orally once a day (at bedtime)  spironolactone 25 mg oral tablet: 0.5 tab(s) orally once a day   aspirin 81 mg oral delayed release tablet: 1 tab(s) orally once a day  bumetanide 1 mg oral tablet: 1 tab(s) orally every other day Please take 1 pill every other day  clopidogrel 75 mg oral tablet: 1 tab(s) orally once a day  Flomax 0.4 mg oral capsule: 1 cap(s) orally once a day  FLUoxetine 20 mg oral capsule: 1 cap(s) orally once a day  mirtazapine 15 mg oral tablet: 1 tab(s) orally once a day (at bedtime)  rosuvastatin 20 mg oral tablet: 1 tab(s) orally once a day (at bedtime)  spironolactone 25 mg oral tablet: 0.5 tab(s) orally once a day

## 2024-04-10 NOTE — PROGRESS NOTE ADULT - NS ATTEND AMEND GEN_ALL_CORE FT
79 y/o M with PMHx of CAD, HFrEF (25-30%), BPH, Anxiety, Bipolar d/o, Lung Ca, depression being admitted for acute on CKD and presyncope likely due to polypharmacy. Cardio appreciated - tolerating aldactone 12.5mg daily with parameters. tolerating bumex 1mg every other day. asymptomatic. medically optimized for discharge home.
77 y/o M with PMHx of CAD, HFrEF (25-30%), BPH, Anxiety, Bipolar d/o, Lung Ca, depression being admitted for acute on CKD and presyncope likely due to polypharmacy. Cardio appreciated - tolerating aldactone 12.5mg daily with parameters. monitor pro-bnp - remains elevated. will resume bumex 1mg every other day, start 4/9, if well tolerated, dispo planning.
79 y/o M with PMHx of CAD, HFrEF, BPH, Anxiety, Bipolar d/o, Lung Ca, depression being admitted for acute on CKD and presyncope likely due to polypharmacy. Cardio appreciated - resume aldactone 12.5mg daily with parameters. follow pro-bnp, will resume diuretic as able. renal function improving. echo reviewed - EF 25-30%.

## 2024-04-10 NOTE — PROGRESS NOTE ADULT - ASSESSMENT
77 y/o M with PMHx of CAD, CHF, BPH, Anxiety, Bipolar d/o, Lung Ca, depression being admitted for acute on CKD and dizziness.    #Pre-syncope  #Acute on CKD3 - improving  #Hypotension  - Likely 2/2 to overdiuresis at home with bumex  - Renal function improving, cont to monitor  - started aldactone 12.5mg with hold parameters   - restarted bumex 1mg every other day   - Follow orthostatics  - Cardiology consult recs appreciated   - no PT needs, patient ambulates independently     #HFrEF (EF 25-30%)  #CAD  - continue aldactone, bumex   - I/Os, daily weight  - Echo: EF 25 to 30%. Severely decreased global left ventricular systolic function.  - continue ASA/Plavix  - d/c Toprol due to patient not receiving. 2/2 hold parameters      #BPH  - Continue flomax 0.4mg daily (wife had recently cut from 2 tabs to 1 tab 2/2 dizziness)    #Transaminitis   - improving  - follow up labs     #HLD  - Continue lipitor 80mg qhs  - Follow lipid panel    #Depression  - Continue prozac 20mg daily, remeron at bedtime    #Lung Cancer  - Recent CT chest showed concerning lesion  - Will have f/u PET scan with their oncologist, per wife    #Hyponatremia  - Improved    #DVTppx:  - lovenox     Dispo: discharge planning     4/10: Updated patient and wife at bedside

## 2024-04-10 NOTE — PROGRESS NOTE ADULT - SUBJECTIVE AND OBJECTIVE BOX
Patient is a 78y old  Male who presents with a chief complaint of dizziness and low BP (10 Apr 2024 09:08)      Patient seen and examined at bedside. No overnight events reported.     ALLERGIES:  No Known Allergies    MEDICATIONS  (STANDING):  aspirin enteric coated 81 milliGRAM(s) Oral daily  atorvastatin 80 milliGRAM(s) Oral at bedtime  buMETAnide 1 milliGRAM(s) Oral <User Schedule>  clopidogrel Tablet 75 milliGRAM(s) Oral daily  enoxaparin Injectable 40 milliGRAM(s) SubCutaneous every 24 hours  FLUoxetine 20 milliGRAM(s) Oral daily  mirtazapine 15 milliGRAM(s) Oral at bedtime  spironolactone 12.5 milliGRAM(s) Oral daily  tamsulosin 0.4 milliGRAM(s) Oral at bedtime    MEDICATIONS  (PRN):  acetaminophen     Tablet .. 650 milliGRAM(s) Oral every 6 hours PRN Mild Pain (1 - 3), Moderate Pain (4 - 6), Severe Pain (7 - 10)  aluminum hydroxide/magnesium hydroxide/simethicone Suspension 30 milliLiter(s) Oral every 4 hours PRN Dyspepsia  melatonin 3 milliGRAM(s) Oral at bedtime PRN Insomnia  ondansetron Injectable 4 milliGRAM(s) IV Push every 8 hours PRN Nausea and/or Vomiting    Vital Signs Last 24 Hrs  T(F): 97.5 (10 Apr 2024 04:58), Max: 98.6 (10 Apr 2024 01:00)  HR: 81 (10 Apr 2024 04:58) (52 - 87)  BP: 108/55 (10 Apr 2024 04:58) (92/49 - 112/56)  RR: 16 (10 Apr 2024 04:58) (16 - 20)  SpO2: 96% (10 Apr 2024 04:58) (94% - 99%)  I&O's Summary    PHYSICAL EXAM:  General: NAD, A/O x 3  ENT: No gross hearing impairment, Moist mucous membranes, no thrush  Neck: Supple, No JVD  Lungs: Clear to auscultation bilaterally, good air entry, non-labored breathing  Cardio: RRR, S1/S2, No murmur  Abdomen: Soft, Nontender, Nondistended; Bowel sounds present  Extremities: No calf tenderness, No cyanosis, No pitting edema  Psych: Appropriate mood and affect    LABS:                        10.9   5.79  )-----------( 174      ( 10 Apr 2024 05:54 )             31.6     04-10    135  |  102  |  45  ----------------------------<  94  4.5   |  25  |  1.44    Ca    8.9      10 Apr 2024 05:54  Mg     2.3     04-08    TPro  6.8  /  Alb  2.7  /  TBili  0.8  /  DBili  x   /  AST  41  /  ALT  29  /  AlkPhos  193  04-08                                      Urinalysis Basic - ( 10 Apr 2024 05:54 )    Color: x / Appearance: x / SG: x / pH: x  Gluc: 94 mg/dL / Ketone: x  / Bili: x / Urobili: x   Blood: x / Protein: x / Nitrite: x   Leuk Esterase: x / RBC: x / WBC x   Sq Epi: x / Non Sq Epi: x / Bacteria: x          RADIOLOGY & ADDITIONAL TESTS:    Care Discussed with Consultants/Other Providers:

## 2024-04-10 NOTE — DISCHARGE NOTE PROVIDER - CARE PROVIDER_API CALL
Delroy Boston.  [facility]  [address] [place] [id number]  Phone: [phone]  Fax: [phone]  Follow Up Time:

## 2024-04-10 NOTE — DISCHARGE NOTE PROVIDER - NSDCCPCAREPLAN_GEN_ALL_CORE_FT
PRINCIPAL DISCHARGE DIAGNOSIS  Diagnosis: Postural dizziness with presyncope  Assessment and Plan of Treatment: You were admitted for near syncope. You were seen by cardiology, medications were adjusted. Please continue to take as prescribed.  Aldactone 12.5mg once daily  Bumex 1mg every other day  Please stop metoprolol   Follow up with your PCP

## 2024-04-10 NOTE — PROGRESS NOTE ADULT - REASON FOR ADMISSION
dizziness and low BP

## 2024-04-10 NOTE — PROGRESS NOTE ADULT - SUBJECTIVE AND OBJECTIVE BOX
Time of Visit:  Patient seen and examined. Pat is lying inbed comfortable . Wife Kenia at bedside     MEDICATIONS  (STANDING):  aspirin enteric coated 81 milliGRAM(s) Oral daily  atorvastatin 80 milliGRAM(s) Oral at bedtime  buMETAnide 1 milliGRAM(s) Oral <User Schedule>  clopidogrel Tablet 75 milliGRAM(s) Oral daily  enoxaparin Injectable 40 milliGRAM(s) SubCutaneous every 24 hours  FLUoxetine 20 milliGRAM(s) Oral daily  mirtazapine 15 milliGRAM(s) Oral at bedtime  spironolactone 12.5 milliGRAM(s) Oral daily  tamsulosin 0.4 milliGRAM(s) Oral at bedtime      MEDICATIONS  (PRN):  acetaminophen     Tablet .. 650 milliGRAM(s) Oral every 6 hours PRN Mild Pain (1 - 3), Moderate Pain (4 - 6), Severe Pain (7 - 10)  aluminum hydroxide/magnesium hydroxide/simethicone Suspension 30 milliLiter(s) Oral every 4 hours PRN Dyspepsia  melatonin 3 milliGRAM(s) Oral at bedtime PRN Insomnia  ondansetron Injectable 4 milliGRAM(s) IV Push every 8 hours PRN Nausea and/or Vomiting       Medications up to date at time of exam.      PHYSICAL EXAMINATION:  Patient has no new complaints.  GENERAL: The patient  in no apparent distress.     Vital Signs Last 24 Hrs  T(C): 36.6 (10 Apr 2024 12:54), Max: 37 (10 Apr 2024 01:00)  T(F): 97.8 (10 Apr 2024 12:54), Max: 98.6 (10 Apr 2024 01:00)  HR: 87 (10 Apr 2024 12:54) (75 - 87)  BP: 93/54 (10 Apr 2024 12:54) (92/49 - 112/56)  BP(mean): 75 (10 Apr 2024 01:00) (75 - 75)  RR: 16 (10 Apr 2024 12:54) (16 - 18)  SpO2: 98% (10 Apr 2024 12:54) (94% - 99%)    Parameters below as of 10 Apr 2024 12:54  Patient On (Oxygen Delivery Method): room air       (if applicable)    Chest Tube (if applicable)    HEENT: Head is normocephalic and atraumatic. Extraocular muscles are intact. Mucous membranes are moist.     NECK: Supple, no palpable adenopathy.    LUNGS: Fair bilateral berath sounds  no wheezing, rales, or rhonchi.    HEART: Regular rate and rhythm without murmur.    ABDOMEN: Soft, nontender, and nondistended.  No hepatosplenomegaly is noted.    : No painful voiding, no pelvic pain    EXTREMITIES: Without any cyanosis, clubbing, rash, lesions or edema.    NEUROLOGIC: Awake, alert, oriented, grossly intact    SKIN: Warm, dry, good turgor.      LABS:                        10.9   5.79  )-----------( 174      ( 10 Apr 2024 05:54 )             31.6     04-10    135  |  102  |  45<H>  ----------------------------<  94  4.5   |  25  |  1.44<H>    Ca    8.9      10 Apr 2024 05:54        Urinalysis Basic - ( 10 Apr 2024 05:54 )    Color: x / Appearance: x / SG: x / pH: x  Gluc: 94 mg/dL / Ketone: x  / Bili: x / Urobili: x   Blood: x / Protein: x / Nitrite: x   Leuk Esterase: x / RBC: x / WBC x   Sq Epi: x / Non Sq Epi: x / Bacteria: x                      MICROBIOLOGY: (if applicable)    RADIOLOGY & ADDITIONAL STUDIES:  EKG:   CXR:  ECHO:    IMPRESSION: 78y Male PAST MEDICAL & SURGICAL HISTORY:  BPH (benign prostatic hyperplasia)      Bipolar disorder      Depression      Anxiety      Umbilical hernia, incarcerated      Depression      Constipation      Urinary retention      CAD (coronary artery disease)      SCC (squamous cell carcinoma)      Lung mass      Other nonspecific abnormal finding of lung field      LV (left ventricular) mural thrombus      History of inguinal hernia      History of CHF (congestive heart failure)      History of arthroscopy of knee  Right, 1987      History of tonsillectomy  1952      History of hernia repair      H/O coronary angiogram       p/w         IMP: This is a 78 yr old man  CAD, CHF, BPH, Anxiety, Bipolar d/o, Lung Ca, depression, presents to the ED with dizziness this morning while taking a shower. CT chest with HARRY nodule . Hypotension due combination of meds , now improved       Sugg    - Continue BP med as per primary team  - Duoneb q6h prn  ( pat is not using any BD inhalers at present )   - Fall precaution   - Monitor renal fx   - Out pat pulmo f/u     spoke with wife Kenia at bedside

## 2024-04-10 NOTE — DISCHARGE NOTE NURSING/CASE MANAGEMENT/SOCIAL WORK - NSDCVIVACCINE_GEN_ALL_CORE_FT
COVID-19, mRNA, LNP-S, PF, 100 mcg/ 0.5 mL dose (Moderna); 17-May-2022 14:07; Pankaj Velazco (RN); [employer] [id number] (Exp. Date: 05-Jun-2022); IntraMuscular; Deltoid Left.; 0.25 milliLiter(s);

## 2024-04-10 NOTE — DISCHARGE NOTE NURSING/CASE MANAGEMENT/SOCIAL WORK - NSDCPEFALRISK_GEN_ALL_CORE
For information on Fall & Injury Prevention, visit: [email or web address] OR  [email or web address] OR  https://www.cdc.gov/steadi/patient.html

## 2024-04-10 NOTE — DISCHARGE NOTE NURSING/CASE MANAGEMENT/SOCIAL WORK - PATIENT PORTAL LINK FT
You can access the FollowMyHealth Patient Portal offered by [facility] by registering at the following website: [email or web address]. By joining [facility]’s FollowMyHealth portal, you will also be able to view your health information using other applications (apps) compatible with our system.

## 2024-04-11 RX ORDER — BUMETANIDE 2 MG/1
2 TABLET ORAL
Qty: 180 | Refills: 2 | Status: COMPLETED | COMMUNITY
Start: 2023-02-21 | End: 2024-04-11

## 2024-04-11 RX ORDER — MIRTAZAPINE 15 MG/1
15 TABLET, FILM COATED ORAL
Refills: 0 | Status: ACTIVE | COMMUNITY
Start: 2024-04-11

## 2024-04-11 RX ORDER — DAPAGLIFLOZIN 5 MG/1
5 TABLET, FILM COATED ORAL
Refills: 0 | Status: COMPLETED | COMMUNITY
Start: 2024-03-07 | End: 2024-04-11

## 2024-04-11 RX ORDER — METOPROLOL SUCCINATE 25 MG/1
25 TABLET, EXTENDED RELEASE ORAL DAILY
Qty: 90 | Refills: 1 | Status: COMPLETED | COMMUNITY
Start: 2022-07-18 | End: 2024-04-11

## 2024-04-11 RX ORDER — CLOPIDOGREL BISULFATE 75 MG/1
75 TABLET, FILM COATED ORAL DAILY
Refills: 0 | Status: ACTIVE | COMMUNITY
Start: 2024-04-11

## 2024-04-14 NOTE — HISTORY OF PRESENT ILLNESS
[FreeTextEntry1] : Mr. Becerra is a 78  year old man with history of lung cancer. 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 He has extensive cardiac comorbidities and intraoperative course was complicated by cardiogenic shock.  Subsequent imaging detected what appears to be a contralateral new primary lung cancer vs. contralateral metastasis -- 1.7cm apical posterior HARRY nodule detected on CTA 1/3/23, confirmed to be hypermetabolic on PET CT of 1/25/23. No pathologic diagnosis of the HARRY nodule has been made. At time of presentation he is physically asymptomatic apart from OGLESBY, although anxious about his diagnosis. He has been deemed a poor candidate for further surgery.  Referred for consideration of definitive radiation therapy for new clinical stage I primary cancer of the left lung vs. small contralateral oligometastasis. Patient received 50 Gy in 5 fractions to the left lung from 2/17/2023- 3/1/2023.  5/3/2023: Patient presents to clinic for PTE s/p 50 Gy in 5 fractions to the left lung from 2/17/2023- 3/1/2023, patient tolerated treatments well. Patient had CT-Chest performed 5/1/2023. Report pending. Patient reports doing well, OGLESBY and SOB much improved, back to swimming 25 min per day. States been able to mostly resume all pre diagnosis activities. Denies dysphasia, dyspepsia or esophagitis symptoms.   8/17/2023: Patient presents for follow up. PET-CT performed 8/8/2023 Impression: Redemonstration of a left upper lobe lobulated lung nodule that is stable in size (anatomically) and has slightly decreased metabolically (FDG uptake). Continued follow-up as clinically indicated.  He is doing well.  Denies new c/o.  10/13/2023: Patient presents for telephonic follow-up. He was recently admitted in [place] for pneumonia/effusion.  On 9/27/2023 a CT-Chest at that time showed left upper lung density measuring 3.2cm.  Right pleural effusion is noted with HARRY lesion of questionable etiology and significance. No gross focal infiltrate identified. Short term (3 month) follow-up CT scan or PET scan may prove useful as clinically indicated.  He was then admitted and back in  and recently seen by cardiology, a recent echo demonstrating an ejection fraction in the 15% range. Referred for consideration of ICD placement.  He was hospitalized in [facility] for heart failure and fluid retention for 5 days. Denies any new symptoms and feels well.  1/19/2024: Patient presents via telehealth for follow-up. He had a had hospitalization for SOB/RSV, 12/18/23 and discharged on 12/22/23 CT-Chest performed 12/18/2023: Impression: 1. Post right upper lobe lobectomy. No evidence of local disease recurrence. 2. Findings compatible with small large airways inflammation more pronounced when compared to prior imaging. 3. A nodular lesion within the apical posterior segment of the left upper lobe which appears more nodular configuration measuring 32 x 20 mm (333:142). Findings concerning for left upper lobe neoplasm and correlation with whole-body PET/CT is suggested. 4. Bibasilar pleural effusions which have decreased slightly in extent on the right side. 5. Mediastinal as well as paraesophageal lymphadenopath slightly more pronounced. 6. New groundglass opacity within the superior aspect of the residual right middle lobe. Short interval surveillance in 3-6 months is suggested.   PET-CT performed on 1/14/2024 IMPRESSION: 1. Nodular component of the left upper lobe apical segment changes, similar to the most recent CT chest, has increased in size since the prior PET/CT in 8/2023 with persistent increased activity. Increased left pleural effusion since the most recent CT chest. Left upper lobe nodular opacity is suspicious for malignant process; can be percutaneously biopsied medial to the left scapula under CT guidance. 2. Stable postsurgical changes in the right upper lung field with no abnormal activity; right middle lobe subpleural groundglass opacities with patchy activity may reflect inflammatory processes. Reassess on follow-up. 3. No suspicious FDG avid malignant lesion in the remaining field of view.  Today he denies worsening SOB or pain.    4/4/2024: Patient presents to clinic for follow-up. CT-Chest performed on 4/1/2024 MPRESSION: Left upper lobe nodule within the radiation field has a more rounded contour, and gradually become more discrete over multiple prior studies. Neoplasm should be considered. * LUNGS/AIRWAYS/PLEURA: Overall similar size of left lobe 2.3 cm solid nodule within the radiation field, although now has a more rounded contour (3-46), gradually becoming more discrete over multiple prior studies. Right upper lobectomy. Unchanged mild indistinct groundglass in the superior aspect of the right middle lobe (3-43). No pleural effusion. He has complaints of dizziness and has a low BP on vital check. He was given PO fluids and time to recover. After ~one hour his BP returned to acceptable baseline.

## 2024-04-14 NOTE — PHYSICAL EXAM
[Sclera] : the sclera and conjunctiva were normal [Extraocular Movements] : extraocular movements were intact [Outer Ear] : the ears and nose were normal in appearance [Hearing Threshold Finger Rub Not [place]] : hearing was normal [] : no respiratory distress [Exaggerated Use Of Accessory Muscles For Inspiration] : no accessory muscle use [Heart Rate And Rhythm] : heart rate and rhythm were normal [Heart Sounds] : normal S1 and S2 [Arterial Pulses Normal] : the arterial pulses were normal [Edema] : no peripheral edema present [Nondistended] : nondistended [Abdomen Tenderness] : non-tender [Nail Clubbing] : no clubbing  or cyanosis of the fingernails [Normal] : oriented to person, place and time, the affect was normal, the mood was normal and not anxious [de-identified] : patient with dizziness

## 2024-04-14 NOTE — REVIEW OF SYSTEMS
[Dyspepsia: Grade 0] : Dyspepsia: Grade 0 [Dysphagia: Grade 0] : Dysphagia: Grade 0 [Esophagitis: Grade 0] : Esophagitis: Grade 0 [Nausea: Grade 0] : Nausea: Grade 0 [Vomiting: Grade 0] : Vomiting: Grade 0 [Fatigue: Grade 1 - Fatigue relieved by rest] : Fatigue: Grade 1 - Fatigue relieved by rest [Cognitive Disturbance: Grade 0] : Cognitive Disturbance: Grade 0 [Dizziness: Grade 0] : Dizziness: Grade 0  [Headache: Grade 0] : Headache: Grade 0 [Depression: Grade 1 - Mild depressive symptoms] : Depression: Grade 1 - Mild depressive symptoms [Cough: Grade 0] : Cough: Grade 0 [Dyspnea: Grade 2 - Shortness of breath with minimal exertion; limiting instrumental ADL] : Dyspnea: Grade 2 - Shortness of breath with minimal exertion; limiting instrumental ADL [Hoarseness: Grade 1 - Mild or intermittent voice change; fully understandable; self-resolves] : Hoarseness: Grade 1 - Mild or intermittent voice change; fully understandable; self-resolves [Skin Hyperpigmentation: Grade 0] : Skin Hyperpigmentation: Grade 0 [Dermatitis Radiation: Grade 0] : Dermatitis Radiation: Grade 0 [FreeTextEntry2] : intermittent increases of OGLESBY

## 2024-04-17 ENCOUNTER — APPOINTMENT (OUTPATIENT)
Dept: OTOLARYNGOLOGY | Facility: CLINIC | Age: 79
End: 2024-04-17
Payer: MEDICARE

## 2024-04-17 VITALS
HEIGHT: 72 IN | WEIGHT: 178 LBS | DIASTOLIC BLOOD PRESSURE: 61 MMHG | HEART RATE: 71 BPM | SYSTOLIC BLOOD PRESSURE: 96 MMHG | BODY MASS INDEX: 24.11 KG/M2

## 2024-04-17 DIAGNOSIS — H61.20 IMPACTED CERUMEN, UNSPECIFIED EAR: ICD-10-CM

## 2024-04-17 DIAGNOSIS — J34.2 DEVIATED NASAL SEPTUM: ICD-10-CM

## 2024-04-17 DIAGNOSIS — H93.8X1 OTHER SPECIFIED DISORDERS OF RIGHT EAR: ICD-10-CM

## 2024-04-17 PROCEDURE — 99204 OFFICE O/P NEW MOD 45 MIN: CPT | Mod: 25

## 2024-04-17 PROCEDURE — 31575 DIAGNOSTIC LARYNGOSCOPY: CPT

## 2024-04-17 PROCEDURE — 92567 TYMPANOMETRY: CPT

## 2024-04-17 PROCEDURE — 92557 COMPREHENSIVE HEARING TEST: CPT

## 2024-04-17 PROCEDURE — 69210 REMOVE IMPACTED EAR WAX UNI: CPT

## 2024-04-17 RX ORDER — CLOPIDOGREL BISULFATE 75 MG/1
75 TABLET, FILM COATED ORAL
Qty: 180 | Refills: 3 | Status: COMPLETED | COMMUNITY
Start: 2022-10-13 | End: 2024-04-17

## 2024-04-17 RX ORDER — SENNOSIDES 8.6 MG/1
8.6 TABLET ORAL
Refills: 0 | Status: COMPLETED | COMMUNITY
Start: 2022-10-13 | End: 2024-04-17

## 2024-04-17 RX ORDER — MIRTAZAPINE 15 MG/1
15 TABLET, FILM COATED ORAL
Refills: 0 | Status: COMPLETED | COMMUNITY
Start: 2023-01-17 | End: 2024-04-17

## 2024-04-17 RX ORDER — ROSUVASTATIN CALCIUM 20 MG/1
20 TABLET, FILM COATED ORAL
Qty: 1 | Refills: 3 | Status: COMPLETED | COMMUNITY
Start: 2022-10-13 | End: 2024-04-17

## 2024-04-17 NOTE — ADDENDUM
[FreeTextEntry1] : Scribe Attestation: I, Delbert Faulkner, am scribing for and in the presence of Dr. Pankaj Ochoa in the following sections HISTORY OF PRESENT ILLNESS, PAST MEDICAL/FAMILY/SOCIAL HISTORY; REVIEW OF SYSTEMS; VITAL SIGNS; PHYSICAL EXAM; PROCEDURES; DISCUSSION.   I, Dr. Pankaj Ochoa, personally performed the services described in the documentation, reviewed the documentation recorded by the scribe in my presence, and it accurately and completely records my words and actions.

## 2024-04-17 NOTE — PHYSICAL EXAM
[] : septum deviated bilaterally [Midline] : trachea located in midline position [Laryngoscopy Performed] : laryngoscopy was performed, see procedure section for findings [Normal] : no rashes [FreeTextEntry1] : clogged R ear [de-identified] : bilateral cerumen impaction -- debrided [de-identified] : monolayer healed portion of L tympanic membrane-- possibly from prior myringotomy tube many years ago? [de-identified] : hypertrophy

## 2024-04-17 NOTE — PROCEDURE
[Unable to Cooperate with Mirror] : patient unable to cooperate with mirror [Complicated Symptoms] : complicated symptoms requiring more thorough examination than provided by mirror [Topical Lidocaine] : topical lidocaine [Oxymetazoline HCl] : oxymetazoline HCl [Flexible Endoscope] : examined with the flexible endoscope [Serial Number: ___] : Serial Number: [unfilled] [Normal] : posterior cricoid area had healthy pink mucosa in the interarytenoid area and the esophageal inlet [Risk and Benefits Discussed] : The purpose, risks, discomforts, benefits and alternatives of the procedure have been explained to the patient including no treatment. [Cerumen Impaction] : Cerumen Impaction [Same] : same as the Pre Op Dx. [] : Removal of Cerumen [Mass ___ cm] : no masses [de-identified] : Patient was placed in the examination chair in a sitting position. The nose was decongested with oxymetazoline nasal solution. The airway was anesthetized with 4% Xylocaine.  The back of the throat was anesthetized with Cetacaine. Direct flexible/rigid video endoscopy was performed. Findings revealed:  Pt has deviated septum bilaterally, fracture line on R side, turbinate hypertrophy, larynx epiglottis and vocal cords normal. No laryngeal mass or lesions. [FreeTextEntry1] : Pt has clogged ears. [FreeTextEntry5] : Debrided using ear speculum and wax loop.

## 2024-04-17 NOTE — DATA REVIEWED
[de-identified] : 4/17/24 R ear: hearing is -1k Hz, mild to sloping to a severe SNHL, CNM at 4k Hz, type S  L ear: hearing is -1k Hz, moderate sloping to profound mixed -8k Hz, type C

## 2024-04-17 NOTE — HISTORY OF PRESENT ILLNESS
[de-identified] : 78 year old male possible fluid behind right tympanic membrane.  Patient denies otalgia, otorrhea, ear infections, hearing loss, tinnitus, dizziness, vertigo, headaches related to hearing.

## 2024-04-17 NOTE — CONSULT LETTER
[Dear  ___] : Dear  [unfilled], [Courtesy Letter:] : I had the pleasure of seeing your patient, [unfilled], in my office today. [Please see my note below.] : Please see my note below. [Sincerely,] : Sincerely, [FreeTextEntry2] : Delroy Boston [FreeTextEntry3] : Pankaj Ochoa MD, KI, FACS  Department Otolaryngology Director of [facility] Professor of Otolaryngology,  [employer] [employer] [facility]

## 2024-04-17 NOTE — REASON FOR VISIT
[Initial Evaluation] : an initial evaluation for [FreeTextEntry2] : possible fluid behind right tympanic membrane

## 2024-04-19 ENCOUNTER — APPOINTMENT (OUTPATIENT)
Dept: FAMILY MEDICINE | Facility: CLINIC | Age: 79
End: 2024-04-19
Payer: MEDICARE

## 2024-04-19 ENCOUNTER — EMERGENCY (EMERGENCY)
Facility: HOSPITAL | Age: 79
LOS: 1 days | Discharge: HOME | End: 2024-04-19
Attending: EMERGENCY MEDICINE | Admitting: EMERGENCY MEDICINE
Payer: MEDICARE

## 2024-04-19 VITALS
DIASTOLIC BLOOD PRESSURE: 62 MMHG | HEIGHT: 72 IN | WEIGHT: 185 LBS | HEART RATE: 74 BPM | RESPIRATION RATE: 20 BRPM | SYSTOLIC BLOOD PRESSURE: 120 MMHG | BODY MASS INDEX: 25.06 KG/M2

## 2024-04-19 VITALS
OXYGEN SATURATION: 96 % | HEIGHT: 70.98 IN | SYSTOLIC BLOOD PRESSURE: 103 MMHG | DIASTOLIC BLOOD PRESSURE: 61 MMHG | WEIGHT: 177.91 LBS | RESPIRATION RATE: 16 BRPM | TEMPERATURE: 97 F | HEART RATE: 81 BPM

## 2024-04-19 VITALS
OXYGEN SATURATION: 99 % | DIASTOLIC BLOOD PRESSURE: 72 MMHG | HEART RATE: 77 BPM | RESPIRATION RATE: 16 BRPM | TEMPERATURE: 98 F | SYSTOLIC BLOOD PRESSURE: 111 MMHG

## 2024-04-19 DIAGNOSIS — R55 SYNCOPE AND COLLAPSE: ICD-10-CM

## 2024-04-19 DIAGNOSIS — Z98.890 OTHER SPECIFIED POSTPROCEDURAL STATES: Chronic | ICD-10-CM

## 2024-04-19 LAB
ALBUMIN SERPL ELPH-MCNC: 3.1 G/DL — LOW (ref 3.3–5)
ALP SERPL-CCNC: 284 U/L — HIGH (ref 40–120)
ALT FLD-CCNC: 28 U/L — SIGNIFICANT CHANGE UP (ref 10–45)
ANION GAP SERPL CALC-SCNC: 7 MMOL/L — SIGNIFICANT CHANGE UP (ref 5–17)
APTT BLD: 34.7 SEC — SIGNIFICANT CHANGE UP (ref 24.5–35.6)
AST SERPL-CCNC: 31 U/L — SIGNIFICANT CHANGE UP (ref 10–40)
BASOPHILS # BLD AUTO: 0.05 K/UL — SIGNIFICANT CHANGE UP (ref 0–0.2)
BASOPHILS NFR BLD AUTO: 0.9 % — SIGNIFICANT CHANGE UP (ref 0–2)
BILIRUB SERPL-MCNC: 1.2 MG/DL — SIGNIFICANT CHANGE UP (ref 0.2–1.2)
BUN SERPL-MCNC: 44 MG/DL — HIGH (ref 7–23)
CALCIUM SERPL-MCNC: 9.2 MG/DL — SIGNIFICANT CHANGE UP (ref 8.4–10.5)
CHLORIDE SERPL-SCNC: 101 MMOL/L — SIGNIFICANT CHANGE UP (ref 96–108)
CO2 SERPL-SCNC: 27 MMOL/L — SIGNIFICANT CHANGE UP (ref 22–31)
CREAT SERPL-MCNC: 1.54 MG/DL — HIGH (ref 0.5–1.3)
EGFR: 46 ML/MIN/1.73M2 — LOW
EOSINOPHIL # BLD AUTO: 0.47 K/UL — SIGNIFICANT CHANGE UP (ref 0–0.5)
EOSINOPHIL NFR BLD AUTO: 8.6 % — HIGH (ref 0–6)
GLUCOSE SERPL-MCNC: 122 MG/DL — HIGH (ref 70–99)
HCT VFR BLD CALC: 33.1 % — LOW (ref 39–50)
HGB BLD-MCNC: 11.2 G/DL — LOW (ref 13–17)
IMM GRANULOCYTES NFR BLD AUTO: 0.2 % — SIGNIFICANT CHANGE UP (ref 0–0.9)
INR BLD: 1.04 RATIO — SIGNIFICANT CHANGE UP (ref 0.85–1.18)
LIDOCAIN IGE QN: 39 U/L — SIGNIFICANT CHANGE UP (ref 16–77)
LYMPHOCYTES # BLD AUTO: 0.9 K/UL — LOW (ref 1–3.3)
LYMPHOCYTES # BLD AUTO: 16.5 % — SIGNIFICANT CHANGE UP (ref 13–44)
MCHC RBC-ENTMCNC: 31.4 PG — SIGNIFICANT CHANGE UP (ref 27–34)
MCHC RBC-ENTMCNC: 33.8 GM/DL — SIGNIFICANT CHANGE UP (ref 32–36)
MCV RBC AUTO: 92.7 FL — SIGNIFICANT CHANGE UP (ref 80–100)
MONOCYTES # BLD AUTO: 0.73 K/UL — SIGNIFICANT CHANGE UP (ref 0–0.9)
MONOCYTES NFR BLD AUTO: 13.4 % — SIGNIFICANT CHANGE UP (ref 2–14)
NEUTROPHILS # BLD AUTO: 3.28 K/UL — SIGNIFICANT CHANGE UP (ref 1.8–7.4)
NEUTROPHILS NFR BLD AUTO: 60.4 % — SIGNIFICANT CHANGE UP (ref 43–77)
NRBC # BLD: 0 /100 WBCS — SIGNIFICANT CHANGE UP (ref 0–0)
NT-PROBNP SERPL-SCNC: 6357 PG/ML — HIGH (ref 0–300)
PLATELET # BLD AUTO: 184 K/UL — SIGNIFICANT CHANGE UP (ref 150–400)
POTASSIUM SERPL-MCNC: 4.5 MMOL/L — SIGNIFICANT CHANGE UP (ref 3.5–5.3)
POTASSIUM SERPL-SCNC: 4.5 MMOL/L — SIGNIFICANT CHANGE UP (ref 3.5–5.3)
PROT SERPL-MCNC: 7.5 G/DL — SIGNIFICANT CHANGE UP (ref 6–8.3)
PROTHROM AB SERPL-ACNC: 12.2 SEC — SIGNIFICANT CHANGE UP (ref 9.5–13)
RBC # BLD: 3.57 M/UL — LOW (ref 4.2–5.8)
RBC # FLD: 16.1 % — HIGH (ref 10.3–14.5)
SODIUM SERPL-SCNC: 135 MMOL/L — SIGNIFICANT CHANGE UP (ref 135–145)
TROPONIN I, HIGH SENSITIVITY RESULT: 55.4 NG/L — SIGNIFICANT CHANGE UP
TROPONIN I, HIGH SENSITIVITY RESULT: 61 NG/L — SIGNIFICANT CHANGE UP
WBC # BLD: 5.44 K/UL — SIGNIFICANT CHANGE UP (ref 3.8–10.5)
WBC # FLD AUTO: 5.44 K/UL — SIGNIFICANT CHANGE UP (ref 3.8–10.5)

## 2024-04-19 PROCEDURE — 71045 X-RAY EXAM CHEST 1 VIEW: CPT | Mod: 26

## 2024-04-19 PROCEDURE — 99285 EMERGENCY DEPT VISIT HI MDM: CPT | Mod: 25

## 2024-04-19 PROCEDURE — 84484 ASSAY OF TROPONIN QUANT: CPT

## 2024-04-19 PROCEDURE — 71045 X-RAY EXAM CHEST 1 VIEW: CPT

## 2024-04-19 PROCEDURE — 83880 ASSAY OF NATRIURETIC PEPTIDE: CPT

## 2024-04-19 PROCEDURE — 85025 COMPLETE CBC W/AUTO DIFF WBC: CPT

## 2024-04-19 PROCEDURE — 85610 PROTHROMBIN TIME: CPT

## 2024-04-19 PROCEDURE — 96360 HYDRATION IV INFUSION INIT: CPT

## 2024-04-19 PROCEDURE — 93005 ELECTROCARDIOGRAM TRACING: CPT

## 2024-04-19 PROCEDURE — 36415 COLL VENOUS BLD VENIPUNCTURE: CPT

## 2024-04-19 PROCEDURE — 99284 EMERGENCY DEPT VISIT MOD MDM: CPT

## 2024-04-19 PROCEDURE — 83690 ASSAY OF LIPASE: CPT

## 2024-04-19 PROCEDURE — 80053 COMPREHEN METABOLIC PANEL: CPT

## 2024-04-19 PROCEDURE — 99495 TRANSJ CARE MGMT MOD F2F 14D: CPT

## 2024-04-19 PROCEDURE — 96361 HYDRATE IV INFUSION ADD-ON: CPT

## 2024-04-19 PROCEDURE — 85730 THROMBOPLASTIN TIME PARTIAL: CPT

## 2024-04-19 PROCEDURE — 93010 ELECTROCARDIOGRAM REPORT: CPT

## 2024-04-19 RX ORDER — SODIUM CHLORIDE 9 MG/ML
500 INJECTION INTRAMUSCULAR; INTRAVENOUS; SUBCUTANEOUS ONCE
Refills: 0 | Status: COMPLETED | OUTPATIENT
Start: 2024-04-19 | End: 2024-04-19

## 2024-04-19 RX ADMIN — SODIUM CHLORIDE 500 MILLILITER(S): 9 INJECTION INTRAMUSCULAR; INTRAVENOUS; SUBCUTANEOUS at 18:15

## 2024-04-19 RX ADMIN — SODIUM CHLORIDE 1000 MILLILITER(S): 9 INJECTION INTRAMUSCULAR; INTRAVENOUS; SUBCUTANEOUS at 15:50

## 2024-04-19 NOTE — ED PROVIDER NOTE - PATIENT PORTAL LINK FT
You can access the FollowMyHealth Patient Portal offered by [facility] by registering at the following website: [email or web address]. By joining [employer]’s FollowMyHealth portal, you will also be able to view your health information using other applications (apps) compatible with our system.

## 2024-04-19 NOTE — ED PROVIDER NOTE - CLINICAL SUMMARY MEDICAL DECISION MAKING FREE TEXT BOX
78 year old male with left sided CP, resolved on arrival, initially SBP 90s, started gentle IV hydration, labs reviewed- troponin x2 negative, slight increase in proBNP, X ray- stable lesion without effusion,  wife states that SBP 90s is his baseline- confirmed from previous visits, repeat BP improved, patient feels imrpoved, wants to be discharged, will dc

## 2024-04-19 NOTE — ED PROVIDER NOTE - OBJECTIVE STATEMENT
78 year old male with left sided CP today. The pain is sharp, lasting for a few seconds. Currently pain free. Denies fever, NVD, trauma. Denies any associated symptoms. Reports history of lung CA s/p surgery also had a new lesion in the left chest, recently finished radiation, f/u with . Denies any other symptoms.

## 2024-04-19 NOTE — ED PROVIDER NOTE - NSFOLLOWUPINSTRUCTIONS_ED_ALL_ED_FT
please follow up with your cardiologist in 1-3 days to reassess symptoms    return to the nearest ED immediately with any new/worsening symptoms

## 2024-04-19 NOTE — ED PROVIDER NOTE - NORMAL, MLM
Billing Type: Third-Party Bill Bill For Surgical Tray: no Performing Laboratory: [id number] Expected Date Of Service: 08/25/2021 yandy all pertinent systems normal

## 2024-04-19 NOTE — ED ADULT NURSE NOTE - OBJECTIVE STATEMENT
Pt presents to ED with c/o chest pain.  Reports two episodes of left sided sharp chest pain, lasting a few seconds.  Pt reports that he was sitting at that time.  On arrival to ED, pt denies any chest pain, palpitations, dizziness, SOB.  EKG completed.  Continuous cardiac monitoring initiated.

## 2024-04-19 NOTE — ED ADULT TRIAGE NOTE - CHIEF COMPLAINT QUOTE
Pt has chest pain for 1 hour, history of CHF and MI.b  Dr Boston , Dr Walden. Pt has chest pain for 1 hour, history of CHF and MI,  PCP Dr Boston , cardiologist Dr Walden.

## 2024-04-22 LAB
ALBUMIN SERPL ELPH-MCNC: 4.1 G/DL
ALP BLD-CCNC: 295 U/L
ALT SERPL-CCNC: 22 U/L
ANION GAP SERPL CALC-SCNC: 13 MMOL/L
AST SERPL-CCNC: 24 U/L
BILIRUB SERPL-MCNC: 1.1 MG/DL
BUN SERPL-MCNC: 42 MG/DL
CALCIUM SERPL-MCNC: 9.3 MG/DL
CHLORIDE SERPL-SCNC: 100 MMOL/L
CO2 SERPL-SCNC: 23 MMOL/L
CREAT SERPL-MCNC: 1.51 MG/DL
EGFR: 47 ML/MIN/1.73M2
GLUCOSE SERPL-MCNC: 108 MG/DL
HCT VFR BLD CALC: 32.6 %
HGB BLD-MCNC: 10.7 G/DL
MCHC RBC-ENTMCNC: 30.7 PG
MCHC RBC-ENTMCNC: 32.8 GM/DL
MCV RBC AUTO: 93.7 FL
PLATELET # BLD AUTO: 197 K/UL
POTASSIUM SERPL-SCNC: 4.5 MMOL/L
PROT SERPL-MCNC: 7.4 G/DL
RBC # BLD: 3.48 M/UL
RBC # FLD: 16.7 %
SODIUM SERPL-SCNC: 135 MMOL/L
WBC # FLD AUTO: 5.78 K/UL

## 2024-04-23 ENCOUNTER — APPOINTMENT (OUTPATIENT)
Dept: HEMATOLOGY ONCOLOGY | Facility: CLINIC | Age: 79
End: 2024-04-23
Payer: MEDICARE

## 2024-04-23 PROCEDURE — 99213 OFFICE O/P EST LOW 20 MIN: CPT

## 2024-04-23 NOTE — REASON FOR VISIT
[Follow-Up Visit] : a follow-up [Home] : at home, [unfilled] , at the time of the visit. [Medical Office: ([facility])___] : at the medical office located in  [Spouse] : spouse [Patient] : the patient [FreeTextEntry2] : lung cancer

## 2024-04-23 NOTE — PHYSICAL EXAM
[Fully active, able to carry on all pre-disease performance without restriction] : Status 0 - Fully active, able to carry on all pre-disease performance without restriction [Normal] : affect appropriate [de-identified] : breathing appeared unlabored; slightly decreased BS at bases [de-identified] : coloration appeared normal

## 2024-04-23 NOTE — HISTORY OF PRESENT ILLNESS
[Disease: _____________________] : Disease: [unfilled] [T: ___] : T[unfilled] [N: ___] : N[unfilled] [AJCC Stage: ____] : AJCC Stage: [unfilled] [de-identified] : Moderately differentiated invasive acinar adenocarcinoma [de-identified] : 9/2022-PD-L1 () TPS 0%. ALK negative.  EGFR mutation analysis-+ EGFR mutation ([id number], CTG>CGG).\par  1/31/2023-Liquid biopsy-no therapies associated with the genomic findings in this sample. KATHY, 3 Muts/Mb.  [de-identified] : Male, never smoker, w/ hx of bipolar, BPH, SCC of leg, CHF, CAD, hospitalized on May 11-17/2022 for acute MI s/p cardiac Cath with no intervention (LHC showed 100% occlusion for right coronary artery and 70% occlusion of left anterior descending artery. The arteries were not intervened upon due to viability study showing no viability in the areas supplied by the occluded arteries), cardiogenic shock, found to have new 1x 1.3cm LV thrombus on Plavix and Coumadin (Coumadin completed on 08/26/2022), who f/u with cardiologist and c/o SOB, CXR showed RUL nodule.  7/11/2022-CT chest-3.5 cm part solid mass is noted in the right upper lobe as described above. Primary consideration is lung carcinoma.  7/28/2022-PET/CT scan-FDG avid partly solid right upper lung nodule suspicious for malignancy. Small bilateral pleural effusion with minimal FDG avidity, nonspecific. Nonspecific mildly FDG avid low-attenuation left adrenal nodule, possibly an adenoma. Heterogeneous FDG activity in the soft tissue associated with surgical clips overlying the symphysis pubis, probably related from prior procedure.  9/14/20222657-BQ-iizgac biopsy of right upper lung mass-positive for malignant cells, adenocarcinoma. PD-L1 () TPS 0%. ALK negative. EGFR mutation analysis-+ EGFR mutation ([id number], CTG>CGG).  9/23/2022-Status post right VATS-right upper lobectomy-pathology revealed adenocarcinoma, acinar predominant, margin negative. One level 10 lymph node excised and 5 interlobar lymph nodes negative for carcinoma. Right pleural fluid negative for malignant cells. Intraoperative course complicated by cardiogenic shock requiring inotropic support and diuretics.  10/21/2020 2-2 cm lobulated opacity in the left upper lobe, new compared to prior exam.  1/3/2023-CT angiogram of the chest-negative for PE. Mildly increased moderate loculated right pleural effusion since 12/27/2022. Indeterminant 1.7 cm apical posterior left upper lobe nodule, enlarged since 7/2022 (4 mm).  1/25/2023-PET/CT scan-hypermetabolic left upper lobe opacity highly suspicious for malignancy. Small left pleural effusion. No hypermetabolic hilar or mediastinal nodes. Postsurgical changes in the right upper lung field. No suspicious FDG avid lesion at the surgical site. Right pleural effusion slightly decreased from the recent CT chest. Contiguous foci of activity localizing to the vertebral ends of the left 10th-12th ribs with a new fracture in the 11th rib since the recent CT angiogram of the chest. Pattern of abnormalities compatible with traumatic injury, however, in the presence of a hypermetabolic left upper lobe mass, need to follow closely.  1/9/2023-S/P Right thoracentesis, 850 ml fluid was drained. Path revealed negative for malignant cells.  3/1/2023-Completed radiation therapy. Planned Dose: 5 Fx/ 5000 cGy. Treatment Site: Left Lung.  8/8/2023-PET/CT scan-Redemonstration of a left upper lobe lobulated lung nodule that is stable in size (anatomically) and has slightly decreased metabolically (FDG uptake). Continued follow-up as clinically indicated.  12/18/2023-CT chest-1. Post right upper lobe lobectomy. No evidence of local disease recurrence. 2. Findings compatible with small large airways inflammation more pronounced when compared to prior imaging. 3. A nodular lesion within the apical posterior segment of the left upper lobe which appears more nodular configuration measuring 32 x 20 mm (333:142). Findings concerning for left upper lobe neoplasm and correlation with whole-body PET/CT is suggested. 4. Bibasilar pleural effusions which have decreased slightly in extent on the right side. 5. Mediastinal as well as paraesophageal lymphadenopath slightly more pronounced. 6. New groundglass opacity within the superior aspect of the residual right middle lobe. Short interval surveillance in 3-6 months is suggested.  1/14/2024-PET/CT scan-  1. Nodular component of the left upper lobe apical segment changes, similar to the most recent CT chest, has increased in size since the prior PET/CT in 8/2023 with persistent increased activity. Increased left pleural effusion since the most recent CT chest. Left upper lobe nodular opacity is suspicious for malignant process; can be percutaneously biopsied medial to the left scapula under CT guidance. 2. Stable postsurgical changes in the right upper lung field with no abnormal activity; right middle lobe subpleural groundglass opacities with patchy activity may reflect inflammatory processes. Reassess on follow-up. 3. No suspicious FDG avid malignant lesion in the remaining field of view.   [de-identified] : Disease: Right upper lung   Pathology: Moderately differentiated invasive acinar adenocarcinoma   TNM stage: T2a (3.5 cm), N0 AJCC Stage: IB  9/2022-PD-L1 () TPS 0%. ALK negative. EGFR mutation analysis-+ EGFR mutation ([id number], CTG>CGG). 1/31/2023-Liquid biopsy-no therapies associated with the genomic findings in this sample. KATHY, 3 Muts/Mb. [de-identified] : S/P hospitalization for near-syncope. RT MD ordered PET scan-pending scheduling. Feels well currently.  No current complaints of chest pain; no hemoptysis; no fevers.  No complaints of bone pain.  No GI complaints. Remains on Plavix. Started slo-Fe.  Sees Dr. Yu regularly (Psych). Has anxiety, insomnia.

## 2024-04-23 NOTE — PHYSICAL EXAM
[Fully active, able to carry on all pre-disease performance without restriction] : Status 0 - Fully active, able to carry on all pre-disease performance without restriction [Normal] : affect appropriate [de-identified] : breathing appeared unlabored; slightly decreased BS at bases [de-identified] : coloration appeared normal

## 2024-04-23 NOTE — ASSESSMENT
[FreeTextEntry1] : CT chest report, lab work, 4/19/24 medicine note, 4/14/24 radiation oncology note reviewed. #1) 9/2022-Stage IB (pT2aN0) adenocarcinoma of the lung, moderately differentiated-status post right upper lobectomy/lymph node sampling. Intraoperative course complicated by cardiogenic shock. PD-L1 () TPS 0%. ALK negative. EGFR mutation analysis-+ EGFR mutation ([id number], CTG>CGG).  Per NCCN guidelines, may consider observation with expectant surveillance or chemotherapy for high risk patients and osimertinib (if EGFR exon 19 deletion or L858R). Examples of high risk features may include poorly differentiated tumors, vascular invasion, wedge resection, tumors greater than 4 cm, visceral pleural involvement or unknown lymph node status-these features do not apply in this case-in light of this and patient's comorbidities, favored expectant surveillance approach. Had discussed the EGFR mutation with potential implications in the future with treatment available if needed.  1/3/2023-CT angiogram of the chest-negative for PE. Mildly increased moderate loculated right pleural effusion since 12/27/2022. Indeterminant 1.7 cm apical posterior left upper lobe nodule, enlarged since 7/2022 (4 mm). 1/9/2023-S/P Right thoracentesis, 850 ml fluid was drained. Path revealed negative for malignant cells.  1/25/2023-PET/CT scan-hypermetabolic left upper lobe opacity highly suspicious for malignancy. Small left pleural effusion. No hypermetabolic hilar or mediastinal nodes. Postsurgical changes in the right upper lung field. No suspicious FDG avid lesion at the surgical site. Right pleural effusion slightly decreased from the recent CT chest. Contiguous foci of activity localizing to the vertebral ends of the left 10th-12th ribs with a new fracture in the 11th rib since the recent CT angiogram of the chest. Pattern of abnormalities compatible with traumatic injury, however, in the presence of a hypermetabolic left upper lobe mass, need to follow closely.  So, had enlarging left lung lesion suggestive of neoplastic disease-?second primary vs. met. lesion. Patient saw thoracic surgery and was deemed not to be a surgical candidate in light of comorbidities. He was referred to radiation oncology for radiation therapy-completed 3/1/2023-5 Fx/ 5000 cGy. Treatment Site: Left Lung. Had discussed potential role for systemic therapy-note +EGFR mutation with right-sided tumor. However, F/U liquid biopsy at this time -1/31/2023-no therapies associated with the genomic findings in this sample. KATHY, 3 Muts/Mb.  5/1/2023-CT chest-status post right upper lobectomy. No significant change in the 1.6 cm lobulated nodule in the left upper lobe when compared to previous exam. Once again, exact etiology is unclear. Patient with multiple comorbidities, so expectant surveillance.  8/8/2023-PET/CT scan-redemonstration of a left upper lobe lobulated lung nodule that is stable in size (anatomically) and has slightly decreased metabolically (FDG uptake). Continued follow-up as clinically indicated. 12/18/2023-CT chest-1. Post right upper lobe lobectomy. No evidence of local disease recurrence. 2. Findings compatible with small large airways inflammation more pronounced when compared to prior imaging. 3. A nodular lesion within the apical posterior segment of the left upper lobe which appears more nodular configuration measuring 32 x 20 mm (333:142). Findings concerning for left upper lobe neoplasm and correlation with whole-body PET/CT is suggested. 4. Bibasilar pleural effusions which have decreased slightly in extent on the right side. 5. Mediastinal as well as paraesophageal lymphadenopath slightly more pronounced. 6. New groundglass opacity within the superior aspect of the residual right middle lobe. Short interval surveillance in 3-6 months is suggested. 1/14/2024-PET/CT scan-  1. Nodular component of the left upper lobe apical segment changes, similar to the most recent CT chest, has increased in size since the prior PET/CT in 8/2023 with persistent increased activity. Increased left pleural effusion since the most recent CT chest. Left upper lobe nodular opacity is suspicious for malignant process; can be percutaneously biopsied medial to the left scapula under CT guidance. 2. Stable postsurgical changes in the right upper lung field with no abnormal activity; right middle lobe subpleural groundglass opacities with patchy activity may reflect inflammatory processes. Reassess on follow-up. 3. No suspicious FDG avid malignant lesion in the remaining field of view. --had discussed scan results and management options.  To consider biopsy of increasing lesion.  However, radiation oncology input noted/appreciated-may have postradiation changes still, and short interval repeat CT scan of the chest was advised-planned 4/2024-patient/wife wished for this approach.  1/14/2024-PET/CT scan- 1. Nodular component of the left upper lobe apical segment changes, similar to the most recent CT chest, has increased in size since the prior PET/CT in 8/2023 with persistent increased activity. Increased left pleural effusion since the most recent CT chest. Left upper lobe nodular opacity is suspicious for malignant process; can be percutaneously biopsied medial to the left scapula under CT guidance. 2. Stable postsurgical changes in the right upper lung field with no abnormal activity; right middle lobe subpleural groundglass opacities with patchy activity may reflect inflammatory processes. Reassess on follow-up. 3. No suspicious FDG avid malignant lesion in the remaining field of view.  4/1/2024-CT chest-Left upper lobe nodule within the radiation field has a more rounded contour, and gradually become more discrete over multiple prior studies. Neoplasm should be considered. --clinically stable at present --f/u PET scan per radiation oncology  #2) h/o anemia/thrombocytopenia. h/o Intermittent mild epistaxis and renal insufficiency suspect contribute to the anemia. Most recent hemoglobin and platelet count available, WNL. Follow CBC.  --Should hematologic scenario worsen, can consider further evaluation to r/o evolving BM disorder, should patient consent.  #3) continue F/U with cardiologist for elevated creatinine/BP med adjustment as needed; continue f/u with PCP for primary care issues.  #4) h/o elevated LFT's-liver on 1/17/2024 PET/CT scan unremarkable.?  Medication related, liver congestion.  F/U PET/CT scan to be done as well, as above. Continue f/u with PCP.  Patient was given the opportunity to ask questions. His questions have been answered to his apparent satisfaction at this time.  -->Slo-Fe QOD; RTO 4 weeks.

## 2024-04-23 NOTE — HISTORY OF PRESENT ILLNESS
[Disease: _____________________] : Disease: [unfilled] [T: ___] : T[unfilled] [N: ___] : N[unfilled] [AJCC Stage: ____] : AJCC Stage: [unfilled] [de-identified] : Moderately differentiated invasive acinar adenocarcinoma [de-identified] : 9/2022-PD-L1 () TPS 0%. ALK negative.  EGFR mutation analysis-+ EGFR mutation ([id number], CTG>CGG).\par  1/31/2023-Liquid biopsy-no therapies associated with the genomic findings in this sample. KATHY, 3 Muts/Mb.  [de-identified] : Male, never smoker, w/ hx of bipolar, BPH, SCC of leg, CHF, CAD, hospitalized on May 11-17/2022 for acute MI s/p cardiac Cath with no intervention (LHC showed 100% occlusion for right coronary artery and 70% occlusion of left anterior descending artery. The arteries were not intervened upon due to viability study showing no viability in the areas supplied by the occluded arteries), cardiogenic shock, found to have new 1x 1.3cm LV thrombus on Plavix and Coumadin (Coumadin completed on 08/26/2022), who f/u with cardiologist and c/o SOB, CXR showed RUL nodule.  7/11/2022-CT chest-3.5 cm part solid mass is noted in the right upper lobe as described above. Primary consideration is lung carcinoma.  7/28/2022-PET/CT scan-FDG avid partly solid right upper lung nodule suspicious for malignancy. Small bilateral pleural effusion with minimal FDG avidity, nonspecific. Nonspecific mildly FDG avid low-attenuation left adrenal nodule, possibly an adenoma. Heterogeneous FDG activity in the soft tissue associated with surgical clips overlying the symphysis pubis, probably related from prior procedure.  9/14/[id number] biopsy of right upper lung mass-positive for malignant cells, adenocarcinoma. PD-L1 () TPS 0%. ALK negative. EGFR mutation analysis-+ EGFR mutation ([id number], CTG>CGG).  9/23/2022-Status post right VATS-right upper lobectomy-pathology revealed adenocarcinoma, acinar predominant, margin negative. One level 10 lymph node excised and 5 interlobar lymph nodes negative for carcinoma. Right pleural fluid negative for malignant cells. Intraoperative course complicated by cardiogenic shock requiring inotropic support and diuretics.  10/21/2020 2-2 cm lobulated opacity in the left upper lobe, new compared to prior exam.  1/3/2023-CT angiogram of the chest-negative for PE. Mildly increased moderate loculated right pleural effusion since 12/27/2022. Indeterminant 1.7 cm apical posterior left upper lobe nodule, enlarged since 7/2022 (4 mm).  1/25/2023-PET/CT scan-hypermetabolic left upper lobe opacity highly suspicious for malignancy. Small left pleural effusion. No hypermetabolic hilar or mediastinal nodes. Postsurgical changes in the right upper lung field. No suspicious FDG avid lesion at the surgical site. Right pleural effusion slightly decreased from the recent CT chest. Contiguous foci of activity localizing to the vertebral ends of the left 10th-12th ribs with a new fracture in the 11th rib since the recent CT angiogram of the chest. Pattern of abnormalities compatible with traumatic injury, however, in the presence of a hypermetabolic left upper lobe mass, need to follow closely.  1/9/2023-S/P Right thoracentesis, 850 ml fluid was drained. Path revealed negative for malignant cells.  3/1/2023-Completed radiation therapy. Planned Dose: 5 Fx/ 5000 cGy. Treatment Site: Left Lung.  8/8/2023-PET/CT scan-Redemonstration of a left upper lobe lobulated lung nodule that is stable in size (anatomically) and has slightly decreased metabolically (FDG uptake). Continued follow-up as clinically indicated.  12/18/2023-CT chest-1. Post right upper lobe lobectomy. No evidence of local disease recurrence. 2. Findings compatible with small large airways inflammation more pronounced when compared to prior imaging. 3. A nodular lesion within the apical posterior segment of the left upper lobe which appears more nodular configuration measuring 32 x 20 mm (333:142). Findings concerning for left upper lobe neoplasm and correlation with whole-body PET/CT is suggested. 4. Bibasilar pleural effusions which have decreased slightly in extent on the right side. 5. Mediastinal as well as paraesophageal lymphadenopath slightly more pronounced. 6. New groundglass opacity within the superior aspect of the residual right middle lobe. Short interval surveillance in 3-6 months is suggested.  1/14/2024-PET/CT scan-  1. Nodular component of the left upper lobe apical segment changes, similar to the most recent CT chest, has increased in size since the prior PET/CT in 8/2023 with persistent increased activity. Increased left pleural effusion since the most recent CT chest. Left upper lobe nodular opacity is suspicious for malignant process; can be percutaneously biopsied medial to the left scapula under CT guidance. 2. Stable postsurgical changes in the right upper lung field with no abnormal activity; right middle lobe subpleural groundglass opacities with patchy activity may reflect inflammatory processes. Reassess on follow-up. 3. No suspicious FDG avid malignant lesion in the remaining field of view.   [de-identified] : Disease: Right upper lung   Pathology: Moderately differentiated invasive acinar adenocarcinoma   TNM stage: T2a (3.5 cm), N0 AJCC Stage: IB  9/2022-PD-L1 () TPS 0%. ALK negative. EGFR mutation analysis-+ EGFR mutation ([id number], CTG>CGG). 1/31/2023-Liquid biopsy-no therapies associated with the genomic findings in this sample. KATHY, 3 Muts/Mb. [de-identified] : S/P hospitalization for near-syncope. RT MD ordered PET scan-pending scheduling. Feels well currently.  No current complaints of chest pain; no hemoptysis; no fevers.  No complaints of bone pain.  No GI complaints. Remains on Plavix. Started slo-Fe.  Sees Dr. Yu regularly (Psych). Has anxiety, insomnia.

## 2024-04-23 NOTE — ASSESSMENT
[FreeTextEntry1] : CT chest report, lab work, 4/19/24 medicine note, 4/14/24 radiation oncology note reviewed. #1) 9/2022-Stage IB (pT2aN0) adenocarcinoma of the lung, moderately differentiated-status post right upper lobectomy/lymph node sampling. Intraoperative course complicated by cardiogenic shock. PD-L1 () TPS 0%. ALK negative. EGFR mutation analysis-+ EGFR mutation ([id number], CTG>CGG).  Per NCCN guidelines, may consider observation with expectant surveillance or chemotherapy for high risk patients and osimertinib (if EGFR exon 19 deletion or L858R). Examples of high risk features may include poorly differentiated tumors, vascular invasion, wedge resection, tumors greater than 4 cm, visceral pleural involvement or unknown lymph node status-these features do not apply in this case-in light of this and patient's comorbidities, favored expectant surveillance approach. Had discussed the EGFR mutation with potential implications in the future with treatment available if needed.  1/3/2023-CT angiogram of the chest-negative for PE. Mildly increased moderate loculated right pleural effusion since 12/27/2022. Indeterminant 1.7 cm apical posterior left upper lobe nodule, enlarged since 7/2022 (4 mm). 1/9/2023-S/P Right thoracentesis, 850 ml fluid was drained. Path revealed negative for malignant cells.  1/25/2023-PET/CT scan-hypermetabolic left upper lobe opacity highly suspicious for malignancy. Small left pleural effusion. No hypermetabolic hilar or mediastinal nodes. Postsurgical changes in the right upper lung field. No suspicious FDG avid lesion at the surgical site. Right pleural effusion slightly decreased from the recent CT chest. Contiguous foci of activity localizing to the vertebral ends of the left 10th-12th ribs with a new fracture in the 11th rib since the recent CT angiogram of the chest. Pattern of abnormalities compatible with traumatic injury, however, in the presence of a hypermetabolic left upper lobe mass, need to follow closely.  So, had enlarging left lung lesion suggestive of neoplastic disease-?second primary vs. met. lesion. Patient saw thoracic surgery and was deemed not to be a surgical candidate in light of comorbidities. He was referred to radiation oncology for radiation therapy-completed 3/1/2023-5 Fx/ 5000 cGy. Treatment Site: Left Lung. Had discussed potential role for systemic therapy-note +EGFR mutation with right-sided tumor. However, F/U liquid biopsy at this time -1/31/2023-no therapies associated with the genomic findings in this sample. KATHY, 3 Muts/Mb.  5/1/2023-CT chest-status post right upper lobectomy. No significant change in the 1.6 cm lobulated nodule in the left upper lobe when compared to previous exam. Once again, exact etiology is unclear. Patient with multiple comorbidities, so expectant surveillance.  8/8/2023-PET/CT scan-redemonstration of a left upper lobe lobulated lung nodule that is stable in size (anatomically) and has slightly decreased metabolically (FDG uptake). Continued follow-up as clinically indicated. 12/18/2023-CT chest-1. Post right upper lobe lobectomy. No evidence of local disease recurrence. 2. Findings compatible with small large airways inflammation more pronounced when compared to prior imaging. 3. A nodular lesion within the apical posterior segment of the left upper lobe which appears more nodular configuration measuring 32 x 20 mm (333:142). Findings concerning for left upper lobe neoplasm and correlation with whole-body PET/CT is suggested. 4. Bibasilar pleural effusions which have decreased slightly in extent on the right side. 5. Mediastinal as well as paraesophageal lymphadenopath slightly more pronounced. 6. New groundglass opacity within the superior aspect of the residual right middle lobe. Short interval surveillance in 3-6 months is suggested. 1/14/2024-PET/CT scan-  1. Nodular component of the left upper lobe apical segment changes, similar to the most recent CT chest, has increased in size since the prior PET/CT in 8/2023 with persistent increased activity. Increased left pleural effusion since the most recent CT chest. Left upper lobe nodular opacity is suspicious for malignant process; can be percutaneously biopsied medial to the left scapula under CT guidance. 2. Stable postsurgical changes in the right upper lung field with no abnormal activity; right middle lobe subpleural groundglass opacities with patchy activity may reflect inflammatory processes. Reassess on follow-up. 3. No suspicious FDG avid malignant lesion in the remaining field of view. --had discussed scan results and management options.  To consider biopsy of increasing lesion.  However, radiation oncology input noted/appreciated-may have postradiation changes still, and short interval repeat CT scan of the chest was advised-planned 4/2024-patient/wife wished for this approach.  1/14/2024-PET/CT scan- 1. Nodular component of the left upper lobe apical segment changes, similar to the most recent CT chest, has increased in size since the prior PET/CT in 8/2023 with persistent increased activity. Increased left pleural effusion since the most recent CT chest. Left upper lobe nodular opacity is suspicious for malignant process; can be percutaneously biopsied medial to the left scapula under CT guidance. 2. Stable postsurgical changes in the right upper lung field with no abnormal activity; right middle lobe subpleural groundglass opacities with patchy activity may reflect inflammatory processes. Reassess on follow-up. 3. No suspicious FDG avid malignant lesion in the remaining field of view.  4/1/2024-CT chest-Left upper lobe nodule within the radiation field has a more rounded contour, and gradually become more discrete over multiple prior studies. Neoplasm should be considered. --clinically stable at present --f/u PET scan per radiation oncology  #2) h/o anemia/thrombocytopenia. h/o Intermittent mild epistaxis and renal insufficiency suspect contribute to the anemia. Most recent hemoglobin and platelet count available, WNL. Follow CBC.  --Should hematologic scenario worsen, can consider further evaluation to r/o evolving BM disorder, should patient consent.  #3) continue F/U with cardiologist for elevated creatinine/BP med adjustment as needed; continue f/u with PCP for primary care issues.  #4) h/o elevated LFT's-liver on 1/17/2024 PET/CT scan unremarkable.?  Medication related, liver congestion.  F/U PET/CT scan to be done as well, as above. Continue f/u with PCP.  Patient was given the opportunity to ask questions. His questions have been answered to his apparent satisfaction at this time.  -->Slo-Fe QOD; RTO 4 weeks.

## 2024-04-27 NOTE — CHART NOTE - NSCHARTNOTEFT_GEN_A_CORE
SW called pt to discuss and assist with follow up care. Pt is a 78 y/o male presented to ED for chest pain.  As per [facility], pt has scheduled cardiology appt with Jay Hanna on 5/2/24.

## 2024-04-29 ENCOUNTER — NON-APPOINTMENT (OUTPATIENT)
Age: 79
End: 2024-04-29

## 2024-04-29 ENCOUNTER — APPOINTMENT (OUTPATIENT)
Dept: HEART FAILURE | Facility: CLINIC | Age: 79
End: 2024-04-29
Payer: MEDICARE

## 2024-04-29 VITALS
DIASTOLIC BLOOD PRESSURE: 64 MMHG | SYSTOLIC BLOOD PRESSURE: 101 MMHG | HEIGHT: 72 IN | WEIGHT: 183 LBS | OXYGEN SATURATION: 97 % | HEART RATE: 72 BPM | BODY MASS INDEX: 24.79 KG/M2

## 2024-04-29 DIAGNOSIS — I50.22 CHRONIC SYSTOLIC (CONGESTIVE) HEART FAILURE: ICD-10-CM

## 2024-04-29 DIAGNOSIS — I25.10 ATHEROSCLEROTIC HEART DISEASE OF NATIVE CORONARY ARTERY W/OUT ANGINA PECTORIS: ICD-10-CM

## 2024-04-29 PROCEDURE — 99214 OFFICE O/P EST MOD 30 MIN: CPT

## 2024-04-29 PROCEDURE — 93000 ELECTROCARDIOGRAM COMPLETE: CPT

## 2024-04-29 NOTE — REASON FOR VISIT
[Follow-Up Visit] : a follow-up [Home] : at home, [unfilled] , at the time of the visit. [Medical Office: ([facility])___] : at the medical office located in  [Spouse] : spouse [Patient] : the patient [FreeTextEntry2] : lung cancer Patient Does Not Follow Diet @Home  Consumes 2-3Meals a Day   Usually Get Food Delivered  Does Take Vitamin/Supplements @Home ([facility])

## 2024-04-29 NOTE — PHYSICAL EXAM
[No Acute Distress] : no acute distress [No Carotid Bruit] : no carotid bruit [Normal S1, S2] : normal S1, S2 [No Gallop] : no gallop [Clear Lung Fields] : clear lung fields [Good Air Entry] : good air entry [No Respiratory Distress] : no respiratory distress  [Soft] : abdomen soft [Non Tender] : non-tender [de-identified] : Elderly male [de-identified] : YUNIOR elevated [de-identified] : RRR [de-identified] : Decreased at bases [de-identified] : trace edema of lower extremities

## 2024-04-29 NOTE — HISTORY OF PRESENT ILLNESS
[FreeTextEntry1] : 78-year-old Male with PMH of bipolar disorder, CAD/STEMI (5/2022), severe 3VD, RHC showing elevated filling pressures and PA sat 59%, CO 3.63, CI 1.70, PVR 2.76, S/P left lung lesion S/P RT 3/1/23. Last admissions at [place] 12/18-12/21/23 for RSV treatment and readmission 12/22/-12/23/23 for shortness of breath and in [place] 2/2024 for ADHF S/P thoracentesis. Pt is here for a follow-up today.  Had been feeling well but now some CHARLEE after getting OOB for the past few days. Wt also up by 5 lbs.

## 2024-04-29 NOTE — CARDIOLOGY SUMMARY
[de-identified] : 3/7/24 NSR with 1 st degree AVB 73, LAFB, NSST 1/4/24 NSR with 1 st degree AVB 61, LAFB, NSST 11/16/23 NSR 61 with 1 st degree AVB 61. LAFB, PRWP, NSST 11/2/23 NSR 68 with 1 st degree AVB 61. LAFB, PRWP, NSST 10/23/23 NSR 68 with 1 st degree AVB 63. LAFB, PRWP, NSST 10/16/23  NSR 74 with 1 st degree AVB 63. LAFB, PRWP, NSST 8/10/23 NSR 67 with 1 st degree AVB 63. LAFB, PRWP, NSST 5/4/23 NSR 67 with 1 st degree AVB 63. LAFB, PRWP, NSST 3/6/23 NSR with 1 st degree AVB 63. LAFB, PRWP, NSST 1/26/23 NSR with 1 st degree AVB 74. LAFB, PRWP, NSST 11/28/22 NSR with 1 st degree AVB 70. LAFB, PRWP, NSST 10/27/22  NSR 71,  LAFB, PRWP, NSST  10/13/22 NSR 98, LAFB, PRWP, NSST with PVC's [de-identified] : 7/31/23 TTE with LVEF 23%, LVIDD 6 cm, mod severe MR, mild diastolic dysfunction, severe LV systolic dysfunction, no LV thrombus, decreased RV systolic function  TTE 1/4/23 with LVEF 27%, LVIDD 6.5 cm , no LV thrombus, severe LV systolic dysfunction, decreased RV systolic dysfunction, severe MR, mild TR.    11/28/22 TTE; LVEF 22%, LVIDD 6.1 cm, mod MR, severe global LV systolic dysfunction, moderate diastolic dysfunction Stage II, with decreased RV systolic function, severe pHTN  9/24/22 TTE; LVEF 34%, LVIDD 6.1 cm, mod MR, mild AR, normal LA, severe global LV systolic dysfunction, moderate diastolic dysfunction Stage II, normal RV systolic function. mod TR, RV systolic function 82 mmHg c/q severe pHTN [de-identified] : The left ventricle was normal in size. The mid to distal\par  anterior, mid to distal anteroseptal, apical, and inferior\par  walls do not demonstrate significant viability. The\par  septum demonstrates mild viability. The remaining segments\par  are viable (predominantly the lateral wall only).\par  He had a viability study with reported left ventricle was normal in size. The mid to distal anterior, mid to distal anteroseptal, apical, and inferior walls do not demonstrate significant viability. The septum demonstrates mild viability. The remaining segments are viable (predominantly the lateral wall only). Discussed with interventional card Dr. Helms and opted to manage medically. Since there has been a decline in LVEF to 225, it was decided to repeat Cardiac viability to see if there were any areas that could be revascularized. [de-identified] : 9/2022 RHC: RA 15, PCWP 31, PA sat 59%, CO 3.63, CI 1.70, PVR 2.76.\par  9/2022:  s/p R/LHC with IABP support which showed  mid %, LAD 70%, D1 70% and RA 15,\par  PCWP 31, PA sat 59%, CO 3.63, CI 1.70, PVR 2.76; medical management\par  \par  Cardiac Catheterization (05.11.22 @ 15:16) >\par  Intra-aortic Balloon Pump\par  An intra-aortic balloon pump was inserted.\par  Diagnostic Findings:\par  Coronary Angiography\par  The coronary circulation is right dominant.\par  LM\par  Left main artery: Angiography shows no disease.\par  LAD\par  Proximal left anterior descending: There is a 70 % stenosis. First\par  diagonal: There is a 70 % stenosis.\par  Patient: ELYSE MALAVE MRN: [id number]\par  Study Date: 05/11/2022 03:16 PM Page 1 of 4\par  CX\par  Circumflex: Angiography shows no disease.\par  RCA\par  Mid right coronary artery: There is a 100 % stenosis.\par  Exam Start Time: 03:16 PM\par  Exam End Time: 03:53 PM\par  Exam Duration: 37 min\par  Patient: ELYSE MALAVE MRN: [id number]\par  Study Date: 05/11/2022 03:16 PM Page 2 of 4\par  Hemodynamic Pressures:\par  Phase Location [mmHg] [mmHg]\par  [mmHg] HR\par  Baseline LV s 115\par  ed 36 88\par  Baseline Ao s 126 d 85\par  m 102 103\par  Baseline RV s 50\par  ed 17 81\par  Baseline PA s 56 d 31\par  m 41 81\par  Baseline PCW a 32 v 34\par  m 31 77\par  Baseline RA a 20 v 16\par  m 15 95\par  \par   [de-identified] : 1/25/23 doppler of legs with no DVT bilaterally  [de-identified] : PET/CT 1/25/23. Dr. Au reviewed results with patient and his wife notable for hypermetabolic HARRY opacity highly suspicious for malignancy, small left pleural effusion.  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 small left pleural effusion on   1/3/2023- CTA chest showing mildly loculated R pleural effusion and a 1.7cm L upper lobe  pulmonary nodule

## 2024-04-29 NOTE — DISCUSSION/SUMMARY
[Patient] : the patient [FreeTextEntry2] : wife [FreeTextEntry3] : 4 weeks [FreeTextEntry1] : Increase Bumex to 2 mg po qd x 3d, foll by 1 mg po qd. Start Farxiga 5 mg po qd. [EKG obtained to assist in diagnosis and management of assessed problem(s)] : EKG obtained to assist in diagnosis and management of assessed problem(s)

## 2024-05-02 ENCOUNTER — APPOINTMENT (OUTPATIENT)
Dept: FAMILY MEDICINE | Facility: CLINIC | Age: 79
End: 2024-05-02
Payer: MEDICARE

## 2024-05-02 VITALS
BODY MASS INDEX: 24.11 KG/M2 | SYSTOLIC BLOOD PRESSURE: 114 MMHG | WEIGHT: 178 LBS | HEIGHT: 72 IN | DIASTOLIC BLOOD PRESSURE: 70 MMHG | HEART RATE: 68 BPM | RESPIRATION RATE: 20 BRPM

## 2024-05-02 DIAGNOSIS — I10 ESSENTIAL (PRIMARY) HYPERTENSION: ICD-10-CM

## 2024-05-02 PROCEDURE — 36415 COLL VENOUS BLD VENIPUNCTURE: CPT

## 2024-05-02 PROCEDURE — 99214 OFFICE O/P EST MOD 30 MIN: CPT

## 2024-05-02 PROCEDURE — G2211 COMPLEX E/M VISIT ADD ON: CPT

## 2024-05-02 NOTE — HISTORY OF PRESENT ILLNESS
[de-identified] : Presents with wife for BP check and general follow-up with labs with attention to CBC.  Taking Fe; states feeling improved; increased activities. 
no

## 2024-05-02 NOTE — ASSESSMENT
[FreeTextEntry1] : Hemodynamically stable with acceptable BP Lab profiles drawn in office and sent Cardiopulmonary status stable with no clinical evidence of decompensation

## 2024-05-03 LAB
ALBUMIN SERPL ELPH-MCNC: 4.1 G/DL
ALP BLD-CCNC: 263 U/L
ALT SERPL-CCNC: 21 U/L
ANION GAP SERPL CALC-SCNC: 13 MMOL/L
AST SERPL-CCNC: 28 U/L
BILIRUB SERPL-MCNC: 1.3 MG/DL
BUN SERPL-MCNC: 37 MG/DL
CALCIUM SERPL-MCNC: 9.2 MG/DL
CHLORIDE SERPL-SCNC: 97 MMOL/L
CO2 SERPL-SCNC: 24 MMOL/L
CREAT SERPL-MCNC: 1.54 MG/DL
EGFR: 46 ML/MIN/1.73M2
FERRITIN SERPL-MCNC: 147 NG/ML
GLUCOSE SERPL-MCNC: 90 MG/DL
HCT VFR BLD CALC: 36.9 %
HGB BLD-MCNC: 11.9 G/DL
IRON SATN MFR SERPL: 81 %
IRON SERPL-MCNC: 306 UG/DL
MCHC RBC-ENTMCNC: 31.4 PG
MCHC RBC-ENTMCNC: 32.2 GM/DL
MCV RBC AUTO: 97.4 FL
PLATELET # BLD AUTO: 204 K/UL
POTASSIUM SERPL-SCNC: 4.3 MMOL/L
PROT SERPL-MCNC: 7.2 G/DL
RBC # BLD: 3.79 M/UL
RBC # FLD: 18.1 %
SODIUM SERPL-SCNC: 134 MMOL/L
TIBC SERPL-MCNC: 376 UG/DL
UIBC SERPL-MCNC: 70 UG/DL
WBC # FLD AUTO: 5.37 K/UL

## 2024-05-05 ENCOUNTER — APPOINTMENT (OUTPATIENT)
Dept: NUCLEAR MEDICINE | Facility: CLINIC | Age: 79
End: 2024-05-05
Payer: MEDICARE

## 2024-05-05 ENCOUNTER — OUTPATIENT (OUTPATIENT)
Dept: OUTPATIENT SERVICES | Facility: HOSPITAL | Age: 79
LOS: 1 days | End: 2024-05-05
Payer: MEDICARE

## 2024-05-05 DIAGNOSIS — Z98.890 OTHER SPECIFIED POSTPROCEDURAL STATES: Chronic | ICD-10-CM

## 2024-05-05 DIAGNOSIS — C34.91 MALIGNANT NEOPLASM OF UNSPECIFIED PART OF RIGHT BRONCHUS OR LUNG: ICD-10-CM

## 2024-05-05 DIAGNOSIS — R91.1 SOLITARY PULMONARY NODULE: ICD-10-CM

## 2024-05-05 PROCEDURE — A9552: CPT

## 2024-05-05 PROCEDURE — 78815 PET IMAGE W/CT SKULL-THIGH: CPT

## 2024-05-05 PROCEDURE — 78815 PET IMAGE W/CT SKULL-THIGH: CPT | Mod: 26,PS,MH

## 2024-05-06 ENCOUNTER — NON-APPOINTMENT (OUTPATIENT)
Age: 79
End: 2024-05-06

## 2024-05-09 ENCOUNTER — APPOINTMENT (OUTPATIENT)
Dept: RADIATION ONCOLOGY | Facility: CLINIC | Age: 79
End: 2024-05-09
Payer: MEDICARE

## 2024-05-09 PROCEDURE — 99442: CPT | Mod: GC,93

## 2024-05-09 NOTE — REASON FOR VISIT
[Routine Follow-Up] : routine follow-up visit for [Lung Cancer] : lung cancer [Home] : at home, [unfilled] , at the time of the visit. [Medical Office: ([facility])___] : at the medical office located in  [Spouse] : spouse [Verbal consent obtained from patient] : the patient, [unfilled]

## 2024-05-20 ENCOUNTER — LABORATORY RESULT (OUTPATIENT)
Age: 79
End: 2024-05-20

## 2024-05-21 NOTE — HISTORY OF PRESENT ILLNESS
[FreeTextEntry1] : Mr. Becerra is a 78  year old man with history of lung cancer. 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 He has extensive cardiac comorbidities and intraoperative course was complicated by cardiogenic shock.  Subsequent imaging detected what appears to be a contralateral new primary lung cancer vs. contralateral metastasis -- 1.7cm apical posterior HARRY nodule detected on CTA 1/3/23, confirmed to be hypermetabolic on PET CT of 1/25/23. No pathologic diagnosis of the HARRY nodule has been made. At time of presentation he is physically asymptomatic apart from OGLESBY, although anxious about his diagnosis. He has been deemed a poor candidate for further surgery.  Referred for consideration of definitive radiation therapy for new clinical stage I primary cancer of the left lung vs. small contralateral oligometastasis. Patient received 50 Gy in 5 fractions to the left lung from 2/17/2023- 3/1/2023.  5/3/2023: Patient presents to clinic for PTE s/p 50 Gy in 5 fractions to the left lung from 2/17/2023- 3/1/2023, patient tolerated treatments well. Patient had CT-Chest performed 5/1/2023. Report pending. Patient reports doing well, OGLESBY and SOB much improved, back to swimming 25 min per day. States been able to mostly resume all pre diagnosis activities. Denies dysphasia, dyspepsia or esophagitis symptoms.   8/17/2023: Patient presents for follow up. PET-CT performed 8/8/2023 Impression: Redemonstration of a left upper lobe lobulated lung nodule that is stable in size (anatomically) and has slightly decreased metabolically (FDG uptake). Continued follow-up as clinically indicated.  He is doing well.  Denies new c/o.  10/13/2023: Patient presents for telephonic follow-up. He was recently admitted in [place] for pneumonia/effusion.  On 9/27/2023 a CT-Chest at that time showed left upper lung density measuring 3.2cm.  Right pleural effusion is noted with HARRY lesion of questionable etiology and significance. No gross focal infiltrate identified. Short term (3 month) follow-up CT scan or PET scan may prove useful as clinically indicated.  He was then admitted and back in  and recently seen by cardiology, a recent echo demonstrating an ejection fraction in the 15% range. Referred for consideration of ICD placement.  He was hospitalized in [facility] for heart failure and fluid retention for 5 days. Denies any new symptoms and feels well.  1/19/2024: Patient presents via telehealth for follow-up. He had a had hospitalization for SOB/RSV, 12/18/23 and discharged on 12/22/23 CT-Chest performed 12/18/2023: Impression: 1. Post right upper lobe lobectomy. No evidence of local disease recurrence. 2. Findings compatible with small large airways inflammation more pronounced when compared to prior imaging. 3. A nodular lesion within the apical posterior segment of the left upper lobe which appears more nodular configuration measuring 32 x 20 mm (333:142). Findings concerning for left upper lobe neoplasm and correlation with whole-body PET/CT is suggested. 4. Bibasilar pleural effusions which have decreased slightly in extent on the right side. 5. Mediastinal as well as paraesophageal lymphadenopath slightly more pronounced. 6. New groundglass opacity within the superior aspect of the residual right middle lobe. Short interval surveillance in 3-6 months is suggested.   PET-CT performed on 1/14/2024 IMPRESSION: 1. Nodular component of the left upper lobe apical segment changes, similar to the most recent CT chest, has increased in size since the prior PET/CT in 8/2023 with persistent increased activity. Increased left pleural effusion since the most recent CT chest. Left upper lobe nodular opacity is suspicious for malignant process; can be percutaneously biopsied medial to the left scapula under CT guidance. 2. Stable postsurgical changes in the right upper lung field with no abnormal activity; right middle lobe subpleural groundglass opacities with patchy activity may reflect inflammatory processes. Reassess on follow-up. 3. No suspicious FDG avid malignant lesion in the remaining field of view.  Today he denies worsening SOB or pain.    4/4/2024: Patient presents to clinic for follow-up. CT-Chest performed on 4/1/2024 MPRESSION: Left upper lobe nodule within the radiation field has a more rounded contour, and gradually become more discrete over multiple prior studies. Neoplasm should be considered. * LUNGS/AIRWAYS/PLEURA: Overall similar size of left lobe 2.3 cm solid nodule within the radiation field, although now has a more rounded contour (3-46), gradually becoming more discrete over multiple prior studies. Right upper lobectomy. Unchanged mild indistinct groundglass in the superior aspect of the right middle lobe (3-43). No pleural effusion. He has complaints of dizziness and has a low BP on vital check. He was given PO fluids and time to recover. After ~one hour his BP returned to acceptable baseline.  5/9/2024: Patient presents for follow-up via telephonic visit. PET-CT performed on 5/5/204IMPRESSION: 1.  Left UPPER lobe pulmonary nodule appears increasingly rounded solid, with quantitatively stable uptake. Malignancy is suspected and tissue sampling may be helpful. 2.  Ill-defined hazy/groundglass findings in the lateral RIGHT LOWER lobe now demonstrates focal uptake appears more prominent than on recent CT.. Malignant involvement is not excluded. 3.  New RIGHT pleural effusion.  Since his last visit, patient s/p hospitalization 4/6/2024-4/10/2024 admitted for presyncope episode and hypotension- followed up with cardiology and meds adjusted. He had a subsequent ED visit on 4/19 with left sided chest pain and D/C'd to home same day. He is feeling well today after recent medication changes, and has no new or worsening symptoms. Wife nearby on speaker.

## 2024-05-21 NOTE — PHYSICAL EXAM
[FreeTextEntry1] : Patient present via telehealth. Speaking in clear full sentences, not in acute distress.

## 2024-05-22 ENCOUNTER — INPATIENT (INPATIENT)
Facility: HOSPITAL | Age: 79
LOS: 2 days | Discharge: HOME | DRG: 684 | End: 2024-05-25
Attending: STUDENT IN AN ORGANIZED HEALTH CARE EDUCATION/TRAINING PROGRAM | Admitting: INTERNAL MEDICINE
Payer: MEDICARE

## 2024-05-22 VITALS
DIASTOLIC BLOOD PRESSURE: 66 MMHG | HEART RATE: 83 BPM | WEIGHT: 182.1 LBS | RESPIRATION RATE: 18 BRPM | OXYGEN SATURATION: 94 % | TEMPERATURE: 98 F | SYSTOLIC BLOOD PRESSURE: 111 MMHG | HEIGHT: 72 IN

## 2024-05-22 DIAGNOSIS — Z98.890 OTHER SPECIFIED POSTPROCEDURAL STATES: Chronic | ICD-10-CM

## 2024-05-22 DIAGNOSIS — N17.9 ACUTE KIDNEY FAILURE, UNSPECIFIED: ICD-10-CM

## 2024-05-22 LAB
ALBUMIN SERPL ELPH-MCNC: 3.3 G/DL — SIGNIFICANT CHANGE UP (ref 3.3–5)
ALP SERPL-CCNC: 283 U/L — HIGH (ref 40–120)
ALT FLD-CCNC: 33 U/L — SIGNIFICANT CHANGE UP (ref 10–45)
ANION GAP SERPL CALC-SCNC: 10 MMOL/L — SIGNIFICANT CHANGE UP (ref 5–17)
AST SERPL-CCNC: 44 U/L — HIGH (ref 10–40)
BASOPHILS # BLD AUTO: 0.06 K/UL — SIGNIFICANT CHANGE UP (ref 0–0.2)
BASOPHILS NFR BLD AUTO: 1 % — SIGNIFICANT CHANGE UP (ref 0–2)
BILIRUB SERPL-MCNC: 1.9 MG/DL — HIGH (ref 0.2–1.2)
BUN SERPL-MCNC: 56 MG/DL — HIGH (ref 7–23)
CALCIUM SERPL-MCNC: 9.3 MG/DL — SIGNIFICANT CHANGE UP (ref 8.4–10.5)
CHLORIDE SERPL-SCNC: 100 MMOL/L — SIGNIFICANT CHANGE UP (ref 96–108)
CO2 SERPL-SCNC: 26 MMOL/L — SIGNIFICANT CHANGE UP (ref 22–31)
CREAT SERPL-MCNC: 1.95 MG/DL — HIGH (ref 0.5–1.3)
EGFR: 34 ML/MIN/1.73M2 — LOW
EOSINOPHIL # BLD AUTO: 0.17 K/UL — SIGNIFICANT CHANGE UP (ref 0–0.5)
EOSINOPHIL NFR BLD AUTO: 3 % — SIGNIFICANT CHANGE UP (ref 0–6)
GLUCOSE SERPL-MCNC: 94 MG/DL — SIGNIFICANT CHANGE UP (ref 70–99)
HCT VFR BLD CALC: 35.8 % — LOW (ref 39–50)
HGB BLD-MCNC: 11.6 G/DL — LOW (ref 13–17)
IMM GRANULOCYTES NFR BLD AUTO: 0.2 % — SIGNIFICANT CHANGE UP (ref 0–0.9)
LYMPHOCYTES # BLD AUTO: 0.83 K/UL — LOW (ref 1–3.3)
LYMPHOCYTES # BLD AUTO: 14.5 % — SIGNIFICANT CHANGE UP (ref 13–44)
MCHC RBC-ENTMCNC: 31.1 PG — SIGNIFICANT CHANGE UP (ref 27–34)
MCHC RBC-ENTMCNC: 32.4 GM/DL — SIGNIFICANT CHANGE UP (ref 32–36)
MCV RBC AUTO: 96 FL — SIGNIFICANT CHANGE UP (ref 80–100)
MONOCYTES # BLD AUTO: 0.9 K/UL — SIGNIFICANT CHANGE UP (ref 0–0.9)
MONOCYTES NFR BLD AUTO: 15.7 % — HIGH (ref 2–14)
NEUTROPHILS # BLD AUTO: 3.76 K/UL — SIGNIFICANT CHANGE UP (ref 1.8–7.4)
NEUTROPHILS NFR BLD AUTO: 65.6 % — SIGNIFICANT CHANGE UP (ref 43–77)
NRBC # BLD: 0 /100 WBCS — SIGNIFICANT CHANGE UP (ref 0–0)
NT-PROBNP SERPL-SCNC: 4697 PG/ML — HIGH (ref 0–300)
PLATELET # BLD AUTO: 158 K/UL — SIGNIFICANT CHANGE UP (ref 150–400)
POTASSIUM SERPL-MCNC: 4.2 MMOL/L — SIGNIFICANT CHANGE UP (ref 3.5–5.3)
POTASSIUM SERPL-SCNC: 4.2 MMOL/L — SIGNIFICANT CHANGE UP (ref 3.5–5.3)
PROT SERPL-MCNC: 7.5 G/DL — SIGNIFICANT CHANGE UP (ref 6–8.3)
RBC # BLD: 3.73 M/UL — LOW (ref 4.2–5.8)
RBC # FLD: 17.4 % — HIGH (ref 10.3–14.5)
SODIUM SERPL-SCNC: 136 MMOL/L — SIGNIFICANT CHANGE UP (ref 135–145)
TROPONIN I, HIGH SENSITIVITY RESULT: 69.4 NG/L — SIGNIFICANT CHANGE UP
TROPONIN I, HIGH SENSITIVITY RESULT: 69.7 NG/L — SIGNIFICANT CHANGE UP
WBC # BLD: 5.73 K/UL — SIGNIFICANT CHANGE UP (ref 3.8–10.5)
WBC # FLD AUTO: 5.73 K/UL — SIGNIFICANT CHANGE UP (ref 3.8–10.5)

## 2024-05-22 PROCEDURE — 99222 1ST HOSP IP/OBS MODERATE 55: CPT

## 2024-05-22 PROCEDURE — 71045 X-RAY EXAM CHEST 1 VIEW: CPT | Mod: 26

## 2024-05-22 PROCEDURE — 99223 1ST HOSP IP/OBS HIGH 75: CPT

## 2024-05-22 PROCEDURE — 99285 EMERGENCY DEPT VISIT HI MDM: CPT

## 2024-05-22 RX ORDER — BUMETANIDE 0.25 MG/ML
1 INJECTION INTRAMUSCULAR; INTRAVENOUS
Refills: 0 | Status: DISCONTINUED | OUTPATIENT
Start: 2024-05-23 | End: 2024-05-23

## 2024-05-22 RX ORDER — BUMETANIDE 0.25 MG/ML
2 INJECTION INTRAMUSCULAR; INTRAVENOUS ONCE
Refills: 0 | Status: COMPLETED | OUTPATIENT
Start: 2024-05-22 | End: 2024-05-22

## 2024-05-22 RX ORDER — HEPARIN SODIUM 5000 [USP'U]/ML
5000 INJECTION INTRAVENOUS; SUBCUTANEOUS EVERY 8 HOURS
Refills: 0 | Status: DISCONTINUED | OUTPATIENT
Start: 2024-05-23 | End: 2024-05-25

## 2024-05-22 RX ORDER — ONDANSETRON 8 MG/1
4 TABLET, FILM COATED ORAL EVERY 8 HOURS
Refills: 0 | Status: DISCONTINUED | OUTPATIENT
Start: 2024-05-22 | End: 2024-05-25

## 2024-05-22 RX ORDER — MIRTAZAPINE 45 MG/1
15 TABLET, ORALLY DISINTEGRATING ORAL AT BEDTIME
Refills: 0 | Status: DISCONTINUED | OUTPATIENT
Start: 2024-05-22 | End: 2024-05-25

## 2024-05-22 RX ORDER — LANOLIN ALCOHOL/MO/W.PET/CERES
3 CREAM (GRAM) TOPICAL AT BEDTIME
Refills: 0 | Status: DISCONTINUED | OUTPATIENT
Start: 2024-05-22 | End: 2024-05-24

## 2024-05-22 RX ORDER — CLOPIDOGREL BISULFATE 75 MG/1
75 TABLET, FILM COATED ORAL DAILY
Refills: 0 | Status: DISCONTINUED | OUTPATIENT
Start: 2024-05-23 | End: 2024-05-25

## 2024-05-22 RX ORDER — ACETAMINOPHEN 500 MG
650 TABLET ORAL EVERY 6 HOURS
Refills: 0 | Status: DISCONTINUED | OUTPATIENT
Start: 2024-05-22 | End: 2024-05-25

## 2024-05-22 RX ORDER — TAMSULOSIN HYDROCHLORIDE 0.4 MG/1
0.4 CAPSULE ORAL
Refills: 0 | Status: DISCONTINUED | OUTPATIENT
Start: 2024-05-22 | End: 2024-05-23

## 2024-05-22 RX ORDER — FLUOXETINE HCL 10 MG
20 CAPSULE ORAL
Refills: 0 | Status: DISCONTINUED | OUTPATIENT
Start: 2024-05-22 | End: 2024-05-23

## 2024-05-22 RX ORDER — TAMSULOSIN HYDROCHLORIDE 0.4 MG/1
0.8 CAPSULE ORAL
Refills: 0 | Status: DISCONTINUED | OUTPATIENT
Start: 2024-05-22 | End: 2024-05-23

## 2024-05-22 RX ORDER — ATORVASTATIN CALCIUM 80 MG/1
80 TABLET, FILM COATED ORAL AT BEDTIME
Refills: 0 | Status: DISCONTINUED | OUTPATIENT
Start: 2024-05-22 | End: 2024-05-23

## 2024-05-22 RX ORDER — ASPIRIN/CALCIUM CARB/MAGNESIUM 324 MG
81 TABLET ORAL DAILY
Refills: 0 | Status: DISCONTINUED | OUTPATIENT
Start: 2024-05-23 | End: 2024-05-25

## 2024-05-22 RX ADMIN — BUMETANIDE 2 MILLIGRAM(S): 0.25 INJECTION INTRAMUSCULAR; INTRAVENOUS at 15:20

## 2024-05-22 RX ADMIN — ATORVASTATIN CALCIUM 80 MILLIGRAM(S): 80 TABLET, FILM COATED ORAL at 22:07

## 2024-05-22 RX ADMIN — TAMSULOSIN HYDROCHLORIDE 0.8 MILLIGRAM(S): 0.4 CAPSULE ORAL at 22:07

## 2024-05-22 RX ADMIN — Medication 3 MILLIGRAM(S): at 23:26

## 2024-05-22 RX ADMIN — MIRTAZAPINE 15 MILLIGRAM(S): 45 TABLET, ORALLY DISINTEGRATING ORAL at 22:07

## 2024-05-22 NOTE — ED ADULT NURSE NOTE - OBJECTIVE STATEMENT
pt came in from home with c.o SOB and feeling more tired x 3 days. pt with O2 sat 97% on RA. in  no apparent respiratory distress on arrival. Denies any chest pain or palpations. PMHx of CAD, CHF, BPH, Anxiety, Bipolar d/o, Lung Ca, depression. pt is on plavix and ASA for h/o of MI.

## 2024-05-22 NOTE — H&P ADULT - NSHPLABSRESULTS_GEN_ALL_CORE
Diagnosis Line Sinus rhythm with 1st degree AV block with frequent premature ventricular complexes  Possible Left atrial enlargement  Left axis deviation  Anteroseptal infarct (cited on or before 11-MAY-2022)  Abnormal ECG  When compared with ECG of 19-APR-2024 14:39,  Premature ventricular complexes are now present  Confirmed by Luis Carlos Gilliam ([id number]) on 5/22/2024 3:52:42 PM LABS:                        11.6   5.73  )-----------( 158      ( 22 May 2024 14:31 )             35.8     05-22    136  |  100  |  56<H>  ----------------------------<  94  4.2   |  26  |  1.95<H>    Ca    9.3      22 May 2024 14:31    TPro  7.5  /  Alb  3.3  /  TBili  1.9<H>  /  DBili  x   /  AST  44<H>  /  ALT  33  /  AlkPhos  283<H>  05-22      Urinalysis Basic - ( 22 May 2024 14:31 )    Color: x / Appearance: x / SG: x / pH: x  Gluc: 94 mg/dL / Ketone: x  / Bili: x / Urobili: x   Blood: x / Protein: x / Nitrite: x   Leuk Esterase: x / RBC: x / WBC x   Sq Epi: x / Non Sq Epi: x / Bacteria: x      CAPILLARY BLOOD GLUCOSE    RADIOLOGY & ADDITIONAL TESTS:    CXR personally reviewed showed possible increased vascular congestion, official read pending    EKG 5/22/2024  Diagnosis Line Sinus rhythm with 1st degree AV block with frequent premature ventricular complexes  Possible Left atrial enlargement  Left axis deviation  Anteroseptal infarct (cited on or before 11-MAY-2022)  Abnormal ECG  When compared with ECG of 19-APR-2024 14:39,  Premature ventricular complexes are now present  Confirmed by Luis Carlos Gilliam ([id number]) on 5/22/2024 3:52:42 PM

## 2024-05-22 NOTE — ED PROVIDER NOTE - PHYSICAL EXAMINATION
ATTENDING PHYSICAL EXAM DR. VALDES ***GEN - NAD; well appearing; A+O x3 ***HEAD - NC/AT ***EYES/NOSE - PERRL, EOMI, mucous membranes moist, no discharge ***THROAT: Oral cavity and pharynx normal. No inflammation, swelling, exudate, or lesions.  ***NECK: Neck supple, non-tender without lymphadenopathy, no masses, no thyromegaly.   ***PULMONARY -   Decreased breath sounds right lung fields to midlung, left basilar crackles ***CARDIAC -s1s2, RRR, no M,G,R  ***ABDOMEN - +BS, ND, NT, soft, no guarding, no rebound, no masses   ***BACK - no CVA tenderness, Normal  spine ***EXTREMITIES - symmetric pulses, 2+ dp, capillary refill < 2 seconds, no clubbing, no cyanosis,  positive lower extremity edema ***SKIN - no rash or bruising   ***NEUROLOGIC - alert

## 2024-05-22 NOTE — PATIENT PROFILE ADULT - FALL HARM RISK - HARM RISK INTERVENTIONS

## 2024-05-22 NOTE — H&P ADULT - NSHPPHYSICALEXAM_GEN_ALL_CORE
T(C): 36.7 (05-22-24 @ 13:59), Max: 36.7 (05-22-24 @ 13:59)  HR: 88 (05-22-24 @ 15:52) (80 - 88)  BP: 129/82 (05-22-24 @ 15:52) (100/71 - 129/82)  RR: 14 (05-22-24 @ 15:52) (14 - 20)  SpO2: 96% (05-22-24 @ 15:52) (94% - 97%)  Wt(kg): --Vital Signs Last 24 Hrs  T(C): 36.7 (22 May 2024 13:59), Max: 36.7 (22 May 2024 13:59)  T(F): 98.1 (22 May 2024 13:59), Max: 98.1 (22 May 2024 13:59)  HR: 88 (22 May 2024 15:52) (80 - 88)  BP: 129/82 (22 May 2024 15:52) (100/71 - 129/82)  BP(mean): 92 (22 May 2024 15:52) (86 - 92)  RR: 14 (22 May 2024 15:52) (14 - 20)  SpO2: 96% (22 May 2024 15:52) (94% - 97%)    Parameters below as of 22 May 2024 15:52  Patient On (Oxygen Delivery Method): room air    PHYSICAL EXAM:  GENERAL: NAD  HEENT: NCAT  NECK: Supple, No JVD  CHEST/LUNG: Diminished at bases, no wheeze  HEART: Regular rate and rhythm; no murmur  ABDOMEN: Soft, Nontender, Nondistended; Bowel sounds present  MUSCULOSKELETAL/EXTREMITIES:  2+ Peripheral Pulses, 1-2+ pitting edema bialterally  PSYCH: Appropriate affect  NEURO: AAO x 3, nonfocal

## 2024-05-22 NOTE — H&P ADULT - ASSESSMENT
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    Acute Decompensated Heart Failure  History of Combined Systolic and Diastolic HF with EF 25-30% (Echo 4/2024)  History of CAD/Valvular Disease (Mild-Mod MR, Moderate TR)  -Trop neg x 1, BNP 4697, CXR finding of ?increase vascular congestion, clinically hypervolemic  -Continue to trend trop x 1  -s/p bumex 2mg IVP x 1 in ED, reeval in AM for further diuresis w/ close monitoring of renal function and electrolytes  -Daily weight  -Strict in's and out's  -Continue aspirin, atorvastatin  -Hold spironlactone and farxiga in setting of BRODY on CKD for now  -Recent echo 4/2024 showed EF 25-30%, grade 1 diastolic dysfunction, Mild to moderate mitral valve regurgitation, Moderate tricuspid regurgitation    BRODY on CKD    Anxiety/Depression/Bipolar Disoder    History of Lung Cancer  HARRY Lung Nodule  -s/p RUL lobectomy (9/2022) with recurrent disease s/p radiation to left lung (completed 3/2023)    Elevated T bili/Alk Phos   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    Acute Decompensated Heart Failure  History of Combined Systolic and Diastolic HF with EF 25-30% (Echo 4/2024)  History of CAD/Valvular Disease (Mild-Mod MR, Moderate TR)  -Trop neg x 1, BNP 4697, CXR finding of ?increase vascular congestion, clinically hypervolemic  -Continue to trend trop x 1  -s/p bumex 2mg IVP x 1 in ED, reeval in AM for further diuresis w/ close monitoring of renal function and electrolytes  -Daily weight  -Strict in's and out's  -Continue aspirin, plavix, atorvastatin  -Hold spironlactone and farxiga in setting of BRODY on CKD for now, resume as appropriate  -Recent echo 4/2024 showed EF 25-30%, grade 1 diastolic dysfunction, Mild to moderate mitral valve regurgitation, Moderate tricuspid regurgitation  -Cardiology consult, left message for service     BRODY on CKD stage III  -Baseline Cr ~1.3-1.5 range  -In setting of diuresis/volume overload  -s/p IV diuresis  -Avoid nephrotoxic agents  -Monitor BMP  -Nephrology consult, left message for Dr. Romeo    Anxiety/Depression/Bipolar Disorder  -Continue prozac, mirtazapine    BPH  -Continue flomax (takes alternating 0.4mg and 0.8mg)    History of Lung Cancer  HARRY Lung Nodule  -s/p RUL lobectomy (9/2022) with recurrent disease s/p radiation to left lung (completed 3/2023)  -Recent PET showed The LEFT lung nodule does appear increasingly rounded solid, and although the degree of uptake there is essentially unchanged. The RIGHT lung appears to have slowly become denser over time, with the appearance of uptake there   are also appearing slightly more prominent.   -Follows with Dr. 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    Acute Decompensated Heart Failure  History of Combined Systolic and Diastolic HF with EF 25-30% (Echo 4/2024)  History of CAD/Valvular Disease (Mild-Mod MR, Moderate TR)  -Trop neg x 1, BNP 4697, CXR finding of ?increase vascular congestion, clinically hypervolemic  -Continue to trend trop x 1  -s/p bumex 2mg IVP x 1 in ED, reeval in AM for further diuresis w/ close monitoring of renal function and electrolytes  -Daily weight  -Strict in's and out's  -Continue aspirin, plavix, atorvastatin for crestor while inpatient  -Hold spironlactone and farxiga in setting of BRODY on CKD for now, resume as appropriate  -Recent echo 4/2024 showed EF 25-30%, grade 1 diastolic dysfunction, Mild to moderate mitral valve regurgitation, Moderate tricuspid regurgitation  -Cardiology consult, left message for service     BRODY on CKD stage III  -Baseline Cr ~1.3-1.5 range  -In setting of diuresis/volume overload  -s/p IV diuresis  -Avoid nephrotoxic agents  -Monitor BMP  -Nephrology consult, left message for Dr. Romeo    Anxiety/Depression/Bipolar Disorder  -Continue prozac, mirtazapine    BPH  -Continue flomax (takes alternating 0.4mg and 0.8mg)    History of Lung Cancer  HARRY Lung Nodule  -s/p RUL lobectomy (9/2022) with recurrent disease s/p radiation to left lung (completed 3/2023)  -Recent PET showed The LEFT lung nodule does appear increasingly rounded solid, and although the degree of uptake there is essentially unchanged. The RIGHT lung appears to have slowly become denser over time, with the appearance of uptake there   are also appearing slightly more prominent.   -Follows with Dr. 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    Acute Decompensated Heart Failure  History of Combined Systolic and Diastolic HF with EF 25-30% (Echo 4/2024)  History of CAD/Valvular Disease (Mild-Mod MR, Moderate TR)  -Trop neg x 1, BNP 4697, CXR finding of ?increase vascular congestion, clinically hypervolemic  -Continue to trend trop x 1  -s/p bumex 2mg IVP x 1 in ED, reeval in AM for further diuresis w/ close monitoring of renal function and electrolytes  -Daily weight  -Strict in's and out's  -Continue aspirin, plavix, atorvastatin for crestor while inpatient  -Hold spironlactone and farxiga in setting of BRODY on CKD for now, resume as appropriate  -Recent echo 4/2024 showed EF 25-30%, grade 1 diastolic dysfunction, Mild to moderate mitral valve regurgitation, Moderate tricuspid regurgitation  -Monitor on telemetry  -Cardiology consult, left message for service     BRODY on CKD stage III  -Baseline Cr ~1.3-1.5 range  -In setting of diuresis/volume overload  -s/p IV diuresis  -Avoid nephrotoxic agents  -Monitor BMP  -Nephrology consult, left message for Dr. Romeo    Anxiety/Depression/Bipolar Disorder  -Continue prozac, mirtazapine    BPH  -Continue flomax (takes alternating 0.4mg and 0.8mg)    History of Lung Cancer  HARRY Lung Nodule  -s/p RUL lobectomy (9/2022) with recurrent disease s/p radiation to left lung (completed 3/2023)  -Recent PET showed The LEFT lung nodule does appear increasingly rounded solid, and although the degree of uptake there is essentially unchanged. The RIGHT lung appears to have slowly become denser over time, with the appearance of uptake there   are also appearing slightly more prominent.   -Follows with Dr. Alejandre for surveillance    Elevated T bili/Alk Phos  -He has no abdominal pain, possibly due to hepatic congestion  -Monitor CMP, further workup pending trend    DVT PPx  -Heparin SC    Plan of care discussed with patient and wife Kenia at bedside, they are in agreement, all questions were answered

## 2024-05-22 NOTE — ED PROVIDER NOTE - OBJECTIVE STATEMENT
77yo male with PMHx of CAD, CHF, BPH, Anxiety, Bipolar d/o, Lung Ca, depression 79yo male with PMHx of CAD, CHF, BPH, Anxiety, Bipolar d/o, Lung Ca, depression.  Patient presents emergency department with increased weight gain approximately 10 pounds over the past 2 weeks and worsening shortness of breath.  He is no longer able to ambulate up the flight of stairs like he is able to at baseline.  He has been taking Bumex and despite his wife increasing the Bumex dosing over the past week to 1 mg and 2 mg alternating he has still not been able to have any improvement in shortness of breath.  No fever no chills no chest pain.  No extremity pain.

## 2024-05-22 NOTE — ED PROVIDER NOTE - NS ED ROS FT
Review of Systems:  	•	CONSTITUTIONAL - no fever, no diaphoresis, no weight change  	•	SKIN - no rash  	•	HEMATOLOGIC - no bleeding, no bruising  	•	EYES - no eye pain, no blurred vision  	•	ENT - no change in hearing, no pain  	•	RESPIRATORY - +shortness of breath, no cough  	•	CARDIAC - no chest pain, no palpitations  	•	GI - no abd pain, no nausea, no vomiting, no diarrhea, no constipation, no bleeding  	•	GENITO-URINARY -no dysuria; no hematuria   	•	MUSCULOSKELETAL - no joint paint, no swelling, no redness  	•	NEUROLOGIC - no weakness, no headache, no anesthesia, no paresthesias

## 2024-05-22 NOTE — CONSULT NOTE ADULT - SUBJECTIVE AND OBJECTIVE BOX
ELYSE BECERRA  [id number]      HPI:    Elyse Becerra is a 79 year old man with past medical history of Bipolar disorder, Coronary artery disease (history of STEMI in 2022, with multivessel CAD with partial viability on nuclear), HFrEF (LVEF 35% in May 2022), and Lung cancer (s/p radiation and lobectomy) with recent hospitalization here last month for syncope from overdiuresis now presents with weight gain and dyspnea on exertion.     Patient follows with heart failure cardiologist, Dr. Jay Shell and was last evaluated by him in April 29, 2024 and he was started on Farxiga 5 mg daily and instructed to increase Bumex to eventually 1 mg daily.       ALLERGIES:  No Known Allergies      CURRENT MEDICATIONS:  acetaminophen     Tablet .. 650 milliGRAM(s) Oral every 6 hours PRN  aluminum hydroxide/magnesium hydroxide/simethicone Suspension 30 milliLiter(s) Oral every 4 hours PRN  melatonin 3 milliGRAM(s) Oral at bedtime PRN  ondansetron Injectable 4 milliGRAM(s) IV Push every 8 hours PRN      ROS:  All 10 systems reviewed and positives noted in HPI    OBJECTIVE:    VITAL SIGNS:  Vital Signs Last 24 Hrs  T(C): 36.7 (22 May 2024 13:59), Max: 36.7 (22 May 2024 13:59)  T(F): 98.1 (22 May 2024 13:59), Max: 98.1 (22 May 2024 13:59)  HR: 88 (22 May 2024 15:52) (80 - 88)  BP: 129/82 (22 May 2024 15:52) (100/71 - 129/82)  BP(mean): 92 (22 May 2024 15:52) (86 - 92)  RR: 14 (22 May 2024 15:52) (14 - 20)  SpO2: 96% (22 May 2024 15:52) (94% - 97%)    Parameters below as of 22 May 2024 15:52  Patient On (Oxygen Delivery Method): room air        PHYSICAL EXAM:  General: well appearing, no distress  HEENT: sclera anicteric  Neck: supple, no carotid bruits b/l  CVS: JVP ~ 7 cm H20, RRR, s1, s2, no murmurs/rubs/gallops  Chest: unlabored respirations, clear to auscultation b/l  Abdomen: non-distended  Extremities: no lower extremity edema b/l  Neuro: awake, alert & oriented x 3  Psych: normal affect      LABS:                        11.6   5.73  )-----------( 158      ( 22 May 2024 14:31 )             35.8     05-22    136  |  100  |  56<H>  ----------------------------<  94  4.2   |  26  |  1.95<H>    Ca    9.3      22 May 2024 14:31    TPro  7.5  /  Alb  3.3  /  TBili  1.9<H>  /  DBili  x   /  AST  44<H>  /  ALT  33  /  AlkPhos  283<H>  05-22      TTE (4/7/24):  LVEF 25-30%, mild LVH  Mildly enlarged right ventricle.  Mild-moderate MR, Moderate TR, Mild AR, Mild AZ    ECG (5/22/24): sinus rhythm, first degree AV block, PVCs, anteroseptal infarct ELYSE BECERRA  [id number]      HPI:    Elyse Becerra is a 79 year old man with past medical history of Bipolar disorder, Coronary artery disease (history of STEMI in 2022, with multivessel CAD with partial viability on nuclear), HFrEF (LVEF 35% in May 2022), and Lung cancer (s/p radiation and lobectomy) with recent hospitalization here last month for syncope from overdiuresis now presents with weight gain and dyspnea on exertion.     Patient follows with heart failure cardiologist, Dr. Jay Shell and was last evaluated by him in April 29, 2024 and he was started on Farxiga 5 mg daily and instructed to increase Bumex to eventually 1 mg daily.       ALLERGIES:  No Known Allergies      CURRENT MEDICATIONS:  acetaminophen     Tablet .. 650 milliGRAM(s) Oral every 6 hours PRN  aluminum hydroxide/magnesium hydroxide/simethicone Suspension 30 milliLiter(s) Oral every 4 hours PRN  melatonin 3 milliGRAM(s) Oral at bedtime PRN  ondansetron Injectable 4 milliGRAM(s) IV Push every 8 hours PRN      ROS:  All 10 systems reviewed and positives noted in HPI    OBJECTIVE:    VITAL SIGNS:  Vital Signs Last 24 Hrs  T(C): 36.7 (22 May 2024 13:59), Max: 36.7 (22 May 2024 13:59)  T(F): 98.1 (22 May 2024 13:59), Max: 98.1 (22 May 2024 13:59)  HR: 88 (22 May 2024 15:52) (80 - 88)  BP: 129/82 (22 May 2024 15:52) (100/71 - 129/82)  BP(mean): 92 (22 May 2024 15:52) (86 - 92)  RR: 14 (22 May 2024 15:52) (14 - 20)  SpO2: 96% (22 May 2024 15:52) (94% - 97%)    Parameters below as of 22 May 2024 15:52  Patient On (Oxygen Delivery Method): room air        PHYSICAL EXAM:  General: fatigued  HEENT: sclera anicteric  Neck: supple  CVS: JVP ~ 7 cm H20, RRR, s1, s2, systolic murmur  Chest: unlabored respirations, decreased breath sounds  Abdomen: non-distended  Extremities: minimal lower extremity edema b/l  Neuro: awake, alert & oriented  Psych: normal affect      LABS:                        11.6   5.73  )-----------( 158      ( 22 May 2024 14:31 )             35.8     05-22    136  |  100  |  56<H>  ----------------------------<  94  4.2   |  26  |  1.95<H>    Ca    9.3      22 May 2024 14:31    TPro  7.5  /  Alb  3.3  /  TBili  1.9<H>  /  DBili  x   /  AST  44<H>  /  ALT  33  /  AlkPhos  283<H>  05-22      TTE (4/7/24):  LVEF 25-30%, mild LVH  Mildly enlarged right ventricle.  Mild-moderate MR, Moderate TR, Mild AR, Mild DC    ECG (5/22/24): sinus rhythm, first degree AV block, PVCs, anteroseptal infarct

## 2024-05-22 NOTE — ED PROVIDER NOTE - CLINICAL SUMMARY MEDICAL DECISION MAKING FREE TEXT BOX
79 male with history of heart failure, presenting with likely acute decompensated heart failure causing volume overload and pulmonary edema versus BRODY from increased doses of diuretics.  The etiology of the decompensation is not certain but is likely due to dietary indiscretion vs medication titration. Alternative etiologies I considered include cardiac (ACS, valvular disease, arrhythmia, myocarditis/endocarditis, dissection) however given unremarkable trop, ekg, cardiac exam have low suspicion. Also considered but low risk for respiratory cause (COPD, asthma, PE, or PNA), medication noncompliance or dietary indiscretion, alcohol or drug abuse, endocrine (thyrotoxicosis), and anemia. The patient was given bumex and admitted for acute management of ADHF.

## 2024-05-22 NOTE — H&P ADULT - HISTORY OF PRESENT ILLNESS
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  Patient accompanied by his wife Kenia who provided collateral information. Patient was otherwise doing well until the last 2 weeks when she noted ~6 pound weight gain associated with increased abd girth. Over the last week, she has been giving him alternating bumex 1mg and 2mg without significant change. Over the last 3 days, patient complained of shortness of breath with minimal exertion and insomnia so was brought in for evaluation.  Patient denies chest pain/palpitation/LH/dizziness. Rest of ROS is negative.    In the ED, he was afebrile and hemodynamically stable, O2Sat 94% on RA.  Labs notable for BRODY on CKD w/ BUN/Cr 56/1.95, t bili 1.9, alk phos 283, trop neg, BNP 4697.   CXR personally reviewed showed possible increased vascular congestion, official read pending  He was given Bumex 2mg IVP x 1.  He is being admitted for further management.    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  Patient accompanied by his wife Kenia who provided collateral information. Patient was otherwise doing well until the last 2 weeks when she noted ~6 pound weight gain associated with increased abd girth. Over the last week, she has been giving him alternating bumex 1mg and 2mg without significant change. Over the last 3 days, patient complained of shortness of breath with minimal exertion and insomnia so was brought in for evaluation.  Patient denies chest pain/palpitation/LH/dizziness. Rest of ROS is negative.  He has been compliant with low salt diet and all his medications.    In the ED, he was afebrile and hemodynamically stable, O2Sat 94% on RA.  Labs notable for BRODY on CKD w/ BUN/Cr 56/1.95, t bili 1.9, alk phos 283, trop neg, BNP 4697.   CXR personally reviewed showed possible increased vascular congestion, official read pending  He was given Bumex 2mg IVP x 1.  He is being admitted for further management.

## 2024-05-22 NOTE — CONSULT NOTE ADULT - ASSESSMENT
Assessment:  John Becerra is a 79 year old man with past medical history of Bipolar disorder, Coronary artery disease (history of STEMI in 2022, with multivessel CAD with partial viability on nuclear), HFrEF (LVEF 35% in May 2022), and Lung cancer (s/p radiation and lobectomy) with recent hospitalization here last month for syncope from overdiuresis now presents with weight gain and dyspnea on exertion, found to have acute on chronic systolic heart failure.     Patient follows with heart failure cardiologist, Dr. Jay Shell and was last evaluated by him in April 29, 2024 and he was started on Farxiga 5 mg daily and instructed to increase Bumex to eventually 1 mg daily.    Assessment:  John Becerra is a 79 year old man with past medical history of Bipolar disorder, Coronary artery disease (history of STEMI in 2022, with multivessel CAD with partial viability on nuclear), HFrEF (LVEF 35% in May 2022), and Lung cancer (s/p radiation and lobectomy) with recent hospitalization here last month for syncope from overdiuresis now presents with weight gain and dyspnea on exertion, found to have acute on chronic systolic heart failure.     ECG consistent with sinus rhythm, old anteroseptal infarct and PVCs, similar to prior ECG, no acute ischemic ST abnormalities. Troponin not elevated in the range to suggest acute coronary syndrome. Most recent echo report from April consistent with LVEF 25-30%, moderate tricuspid regurgitation. Prior coronary angiogram from 2022 with normal left main, 70% stenosis of LAD, 70% stenosis of first diagonal, normal LCx and 100% stenosis of RCA that was medically managed after viability testing indicated mostly infarcted tissue.     Recommendations:  [] Acute on chronic systolic heart failure: Recommend Bumex 1 mg IVP BID with monitoring of renal function, daily weights and electrolyte repletion. In regards to guideline directed medical therapy due to BRODY on CKD would hold Farxiga and Spironolactone at this time and resume when renal function is at baseline. Patient reports mild dietary indiscretion which may have contributed to CHF, however ultimately the patient would be a good candidate for CardioMEMS given recurrent CHF hospitalizations and difficulty with volume management as outpatient. Message sent to his CHF cardiologist, Dr. Jay Shell. May also require ICD if EF remains low on GDMT.    Will sign out this case to cardiologist to follow along tomorrow.    Adam Gilliam MD  Cardiology

## 2024-05-23 ENCOUNTER — APPOINTMENT (OUTPATIENT)
Dept: CARDIOLOGY | Facility: CLINIC | Age: 79
End: 2024-05-23

## 2024-05-23 ENCOUNTER — OUTPATIENT (OUTPATIENT)
Dept: OUTPATIENT SERVICES | Facility: HOSPITAL | Age: 79
LOS: 1 days | Discharge: HOME | End: 2024-05-23

## 2024-05-23 ENCOUNTER — TRANSCRIPTION ENCOUNTER (OUTPATIENT)
Age: 79
End: 2024-05-23

## 2024-05-23 DIAGNOSIS — Z98.890 OTHER SPECIFIED POSTPROCEDURAL STATES: Chronic | ICD-10-CM

## 2024-05-23 DIAGNOSIS — C34.00 MALIGNANT NEOPLASM OF UNSPECIFIED MAIN BRONCHUS: ICD-10-CM

## 2024-05-23 LAB
ALBUMIN SERPL ELPH-MCNC: 3.3 G/DL — SIGNIFICANT CHANGE UP (ref 3.3–5)
ALP SERPL-CCNC: 316 U/L — HIGH (ref 40–120)
ALT FLD-CCNC: 37 U/L — SIGNIFICANT CHANGE UP (ref 10–45)
ANION GAP SERPL CALC-SCNC: 12 MMOL/L — SIGNIFICANT CHANGE UP (ref 5–17)
APPEARANCE UR: CLEAR — SIGNIFICANT CHANGE UP
AST SERPL-CCNC: 46 U/L — HIGH (ref 10–40)
BILIRUB DIRECT SERPL-MCNC: 0.8 MG/DL — HIGH (ref 0–0.3)
BILIRUB SERPL-MCNC: 1.9 MG/DL — HIGH (ref 0.2–1.2)
BILIRUB UR-MCNC: NEGATIVE — SIGNIFICANT CHANGE UP
BUN SERPL-MCNC: 60 MG/DL — HIGH (ref 7–23)
CALCIUM SERPL-MCNC: 9.2 MG/DL — SIGNIFICANT CHANGE UP (ref 8.4–10.5)
CHLORIDE SERPL-SCNC: 102 MMOL/L — SIGNIFICANT CHANGE UP (ref 96–108)
CO2 SERPL-SCNC: 27 MMOL/L — SIGNIFICANT CHANGE UP (ref 22–31)
COLOR SPEC: YELLOW — SIGNIFICANT CHANGE UP
CREAT SERPL-MCNC: 2.11 MG/DL — HIGH (ref 0.5–1.3)
DIFF PNL FLD: NEGATIVE — SIGNIFICANT CHANGE UP
EGFR: 31 ML/MIN/1.73M2 — LOW
GLUCOSE SERPL-MCNC: 102 MG/DL — HIGH (ref 70–99)
GLUCOSE UR QL: 250 MG/DL
HCT VFR BLD CALC: 35.9 % — LOW (ref 39–50)
HGB BLD-MCNC: 12.1 G/DL — LOW (ref 13–17)
KETONES UR-MCNC: NEGATIVE MG/DL — SIGNIFICANT CHANGE UP
LEUKOCYTE ESTERASE UR-ACNC: NEGATIVE — SIGNIFICANT CHANGE UP
LIDOCAIN IGE QN: 40 U/L — SIGNIFICANT CHANGE UP (ref 16–77)
MAGNESIUM SERPL-MCNC: 2.3 MG/DL — SIGNIFICANT CHANGE UP (ref 1.6–2.6)
MCHC RBC-ENTMCNC: 31.9 PG — SIGNIFICANT CHANGE UP (ref 27–34)
MCHC RBC-ENTMCNC: 33.7 GM/DL — SIGNIFICANT CHANGE UP (ref 32–36)
MCV RBC AUTO: 94.7 FL — SIGNIFICANT CHANGE UP (ref 80–100)
NITRITE UR-MCNC: NEGATIVE — SIGNIFICANT CHANGE UP
NRBC # BLD: 0 /100 WBCS — SIGNIFICANT CHANGE UP (ref 0–0)
PH UR: 5 — SIGNIFICANT CHANGE UP (ref 5–8)
PHOSPHATE SERPL-MCNC: 5.1 MG/DL — HIGH (ref 2.5–4.5)
PLATELET # BLD AUTO: 150 K/UL — SIGNIFICANT CHANGE UP (ref 150–400)
POTASSIUM SERPL-MCNC: 3.9 MMOL/L — SIGNIFICANT CHANGE UP (ref 3.5–5.3)
POTASSIUM SERPL-SCNC: 3.9 MMOL/L — SIGNIFICANT CHANGE UP (ref 3.5–5.3)
PROT SERPL-MCNC: 7.5 G/DL — SIGNIFICANT CHANGE UP (ref 6–8.3)
PROT UR-MCNC: NEGATIVE MG/DL — SIGNIFICANT CHANGE UP
RBC # BLD: 3.79 M/UL — LOW (ref 4.2–5.8)
RBC # FLD: 17.3 % — HIGH (ref 10.3–14.5)
SODIUM SERPL-SCNC: 141 MMOL/L — SIGNIFICANT CHANGE UP (ref 135–145)
SP GR SPEC: 1.02 — SIGNIFICANT CHANGE UP (ref 1–1.03)
UROBILINOGEN FLD QL: 1 MG/DL — SIGNIFICANT CHANGE UP (ref 0.2–1)
WBC # BLD: 5.48 K/UL — SIGNIFICANT CHANGE UP (ref 3.8–10.5)
WBC # FLD AUTO: 5.48 K/UL — SIGNIFICANT CHANGE UP (ref 3.8–10.5)

## 2024-05-23 PROCEDURE — 99233 SBSQ HOSP IP/OBS HIGH 50: CPT | Mod: GC

## 2024-05-23 PROCEDURE — 76770 US EXAM ABDO BACK WALL COMP: CPT | Mod: 26

## 2024-05-23 PROCEDURE — 99233 SBSQ HOSP IP/OBS HIGH 50: CPT

## 2024-05-23 PROCEDURE — 76705 ECHO EXAM OF ABDOMEN: CPT | Mod: 26

## 2024-05-23 RX ORDER — METOPROLOL TARTRATE 50 MG
25 TABLET ORAL DAILY
Refills: 0 | Status: DISCONTINUED | OUTPATIENT
Start: 2024-05-23 | End: 2024-05-25

## 2024-05-23 RX ORDER — POLYETHYLENE GLYCOL 3350 17 G/17G
17 POWDER, FOR SOLUTION ORAL
Refills: 0 | Status: DISCONTINUED | OUTPATIENT
Start: 2024-05-23 | End: 2024-05-25

## 2024-05-23 RX ORDER — TAMSULOSIN HYDROCHLORIDE 0.4 MG/1
0.4 CAPSULE ORAL
Refills: 0 | Status: DISCONTINUED | OUTPATIENT
Start: 2024-05-23 | End: 2024-05-23

## 2024-05-23 RX ORDER — FLUOXETINE HCL 10 MG
20 CAPSULE ORAL
Refills: 0 | Status: DISCONTINUED | OUTPATIENT
Start: 2024-05-23 | End: 2024-05-23

## 2024-05-23 RX ORDER — ATORVASTATIN CALCIUM 80 MG/1
80 TABLET, FILM COATED ORAL AT BEDTIME
Refills: 0 | Status: DISCONTINUED | OUTPATIENT
Start: 2024-05-23 | End: 2024-05-25

## 2024-05-23 RX ORDER — TAMSULOSIN HYDROCHLORIDE 0.4 MG/1
0.8 CAPSULE ORAL
Refills: 0 | Status: DISCONTINUED | OUTPATIENT
Start: 2024-05-23 | End: 2024-05-23

## 2024-05-23 RX ORDER — FLUOXETINE HCL 10 MG
20 CAPSULE ORAL
Refills: 0 | DISCHARGE

## 2024-05-23 RX ORDER — TAMSULOSIN HYDROCHLORIDE 0.4 MG/1
0.4 CAPSULE ORAL
Refills: 0 | Status: DISCONTINUED | OUTPATIENT
Start: 2024-05-23 | End: 2024-05-24

## 2024-05-23 RX ORDER — LATANOPROST 0.05 MG/ML
1 SOLUTION/ DROPS OPHTHALMIC; TOPICAL AT BEDTIME
Refills: 0 | Status: DISCONTINUED | OUTPATIENT
Start: 2024-05-23 | End: 2024-05-25

## 2024-05-23 RX ORDER — METOPROLOL TARTRATE 50 MG
1 TABLET ORAL
Refills: 0 | DISCHARGE

## 2024-05-23 RX ORDER — TAMSULOSIN HYDROCHLORIDE 0.4 MG/1
0.8 CAPSULE ORAL
Refills: 0 | Status: DISCONTINUED | OUTPATIENT
Start: 2024-05-24 | End: 2024-05-24

## 2024-05-23 RX ORDER — EMPAGLIFLOZIN 10 MG/1
1 TABLET, FILM COATED ORAL
Refills: 0 | DISCHARGE

## 2024-05-23 RX ORDER — BUMETANIDE 0.25 MG/ML
1 INJECTION INTRAMUSCULAR; INTRAVENOUS ONCE
Refills: 0 | Status: DISCONTINUED | OUTPATIENT
Start: 2024-05-23 | End: 2024-05-23

## 2024-05-23 RX ORDER — SACUBITRIL AND VALSARTAN 24; 26 MG/1; MG/1
1 TABLET, FILM COATED ORAL
Refills: 0 | DISCHARGE

## 2024-05-23 RX ORDER — FLUOXETINE HCL 10 MG
20 CAPSULE ORAL
Refills: 0 | Status: DISCONTINUED | OUTPATIENT
Start: 2024-05-26 | End: 2024-05-25

## 2024-05-23 RX ADMIN — POLYETHYLENE GLYCOL 3350 17 GRAM(S): 17 POWDER, FOR SOLUTION ORAL at 12:19

## 2024-05-23 RX ADMIN — CLOPIDOGREL BISULFATE 75 MILLIGRAM(S): 75 TABLET, FILM COATED ORAL at 10:15

## 2024-05-23 RX ADMIN — Medication 81 MILLIGRAM(S): at 10:16

## 2024-05-23 RX ADMIN — MIRTAZAPINE 15 MILLIGRAM(S): 45 TABLET, ORALLY DISINTEGRATING ORAL at 21:14

## 2024-05-23 RX ADMIN — LATANOPROST 1 DROP(S): 0.05 SOLUTION/ DROPS OPHTHALMIC; TOPICAL at 21:14

## 2024-05-23 RX ADMIN — HEPARIN SODIUM 5000 UNIT(S): 5000 INJECTION INTRAVENOUS; SUBCUTANEOUS at 21:14

## 2024-05-23 RX ADMIN — TAMSULOSIN HYDROCHLORIDE 0.4 MILLIGRAM(S): 0.4 CAPSULE ORAL at 21:14

## 2024-05-23 RX ADMIN — ATORVASTATIN CALCIUM 80 MILLIGRAM(S): 80 TABLET, FILM COATED ORAL at 21:13

## 2024-05-23 RX ADMIN — HEPARIN SODIUM 5000 UNIT(S): 5000 INJECTION INTRAVENOUS; SUBCUTANEOUS at 06:01

## 2024-05-23 RX ADMIN — Medication 3 MILLIGRAM(S): at 21:14

## 2024-05-23 RX ADMIN — BUMETANIDE 1 MILLIGRAM(S): 0.25 INJECTION INTRAMUSCULAR; INTRAVENOUS at 06:01

## 2024-05-23 RX ADMIN — HEPARIN SODIUM 5000 UNIT(S): 5000 INJECTION INTRAVENOUS; SUBCUTANEOUS at 13:49

## 2024-05-23 RX ADMIN — Medication 20 MILLIGRAM(S): at 10:16

## 2024-05-23 NOTE — PROGRESS NOTE ADULT - ASSESSMENT
79-year-old man admitted with complaints of dyspnea and weight gain, consistent with acute exacerbation of chronic systolic heart failure.  Patient diuresed with intravenous Bumex, and is now feeling better.  He has cardiac history of ischemic cardiomyopathy, HFrEF, followed by heart failure team at [facility].  Medical issues include bipolar disorder, lung cancer, status post lobectomy and radiation therapy.  On physical examination today, blood pressure stable.  Elevated JVD suggesting hypervolemia.  Echocardiogram as noted above with severe global left ventricular systolic dysfunction.  Blood work consistent with renal insufficiency.    Recommendations  -Transition to oral diuretics.  -Guideline directed medical therapy is indicated.  Hold Entresto and spironolactone currently due to renal insufficiency.  -Start long-acting beta-blocker, consider carvedilol, with hold parameters   -Monitor labs and electrolytes.  -Cardiology will continue to follow along.  -Discussed with patient and with primary team.

## 2024-05-23 NOTE — DISCHARGE NOTE PROVIDER - NSDCFUSCHEDAPPT_GEN_ALL_CORE_FT
Mayelin Burden  [facility]  [facility]  Scheduled Appointment: 05/30/2024    Jay Shell  [facility]  [facility] [address]  Scheduled Appointment: 07/08/2024     Delroy Boston  [facility]  [facility] [address]  Scheduled Appointment: 05/28/2024    Mayelin Burden  [facility]  [facility]  Scheduled Appointment: 05/30/2024    Jay Shell  [facility]  [facility] [address]  Scheduled Appointment: 07/08/2024

## 2024-05-23 NOTE — DISCHARGE NOTE PROVIDER - ATTENDING DISCHARGE PHYSICAL EXAMINATION:
A+Ox3, no murmurs, lungs CTA b/l, abd NT/ND, negative Velasco's sign, trace lower extremity swelling, no FND     A+Ox3, no murmurs, lungs CTA b/l, abd NT/ND, negative Velasco's sign, no lower extremity swelling, no FND

## 2024-05-23 NOTE — DISCHARGE NOTE PROVIDER - CARE PROVIDER_API CALL
Jay Shell  [facility]  [address] [address] [place] [address], [address]  [place] [address]  Phone: [phone]  Fax: [phone]  Follow Up Time:    Jay Shell  [facility]  [address] [address] [place] [address], [address]  [place] [address]  Phone: [phone]  Fax: [phone]  Follow Up Time:     Melissa Romeo  Nephrology  [address] [facility], [address]  [place] [address]  Phone: [phone]  Fax: [phone]  Follow Up Time:

## 2024-05-23 NOTE — DISCHARGE NOTE PROVIDER - CARE PROVIDERS DIRECT ADDRESSES
[email or web address]

## 2024-05-23 NOTE — PROGRESS NOTE ADULT - SUBJECTIVE AND OBJECTIVE BOX
SUBJ:  Patient is a 79y old  Male who presents with a chief complaint of Weight Gain, OGLESBY (23 May 2024 07:20)  Patient seen and examined  Chart is reviewed and patient examined  Case discussed with Dr. Gilliam       PAST MEDICAL & SURGICAL HISTORY:  BPH (benign prostatic hyperplasia)      Bipolar disorder      Depression      Anxiety      Umbilical hernia, incarcerated      Depression      Constipation      Urinary retention      CAD (coronary artery disease)      SCC (squamous cell carcinoma)      Lung mass      Other nonspecific abnormal finding of lung field      LV (left ventricular) mural thrombus      History of inguinal hernia      History of CHF (congestive heart failure)      History of arthroscopy of knee  Right, 1987      History of tonsillectomy  1952      History of hernia repair      H/O coronary angiogram    Home Medications:  aspirin 81 mg oral delayed release tablet: 1 tab(s) orally once a day (10 Apr 2024 11:08)  bumetanide 1 mg oral tablet: 1 tab(s) orally once a day Takes alternating 1mg and 2mg (22 May 2024 16:52)  clopidogrel 75 mg oral tablet: 1 tab(s) orally once a day (10 Apr 2024 11:08)  Farxiga 5 mg oral tablet: 1 tab(s) orally once a day (22 May 2024 16:52)  Flomax 0.4 mg oral capsule: 1 cap(s) orally once a day Takes alternating 1 cap and 2 cap (22 May 2024 16:50)  FLUoxetine 20 mg oral capsule: 1 cap(s) orally every 72 hours (22 May 2024 16:51)  mirtazapine 15 mg oral tablet: 1 tab(s) orally once a day (at bedtime) (10 Apr 2024 11:08)  spironolactone 25 mg oral tablet: 0.5 tab(s) orally once a day (10 Apr 2024 11:08)        MEDICATIONS  (STANDING):  aspirin enteric coated 81 milliGRAM(s) Oral daily  buMETAnide 1 milliGRAM(s) Oral once  clopidogrel Tablet 75 milliGRAM(s) Oral daily  FLUoxetine 20 milliGRAM(s) Oral <User Schedule>  heparin   Injectable 5000 Unit(s) SubCutaneous every 8 hours  mirtazapine 15 milliGRAM(s) Oral at bedtime  polyethylene glycol 3350 17 Gram(s) Oral two times a day  tamsulosin 0.8 milliGRAM(s) Oral <User Schedule>  tamsulosin 0.4 milliGRAM(s) Oral <User Schedule>    MEDICATIONS  (PRN):  acetaminophen     Tablet .. 650 milliGRAM(s) Oral every 6 hours PRN Temp greater or equal to 38C (100.4F), Mild Pain (1 - 3)  aluminum hydroxide/magnesium hydroxide/simethicone Suspension 30 milliLiter(s) Oral every 4 hours PRN Dyspepsia  melatonin 3 milliGRAM(s) Oral at bedtime PRN Insomnia  ondansetron Injectable 4 milliGRAM(s) IV Push every 8 hours PRN Nausea and/or Vomiting          Vital Signs Last 24 Hrs  T(C): 36.4 (23 May 2024 04:59), Max: 36.7 (22 May 2024 13:59)  T(F): 97.6 (23 May 2024 04:59), Max: 98.1 (22 May 2024 13:59)  HR: 75 (23 May 2024 04:59) (75 - 98)  BP: 100/55 (23 May 2024 04:59) (100/55 - 129/82)  BP(mean): 100 (22 May 2024 19:30) (74 - 100)  RR: 18 (23 May 2024 04:59) (14 - 20)  SpO2: 96% (23 May 2024 04:59) (94% - 97%)    Parameters below as of 23 May 2024 04:59  Patient On (Oxygen Delivery Method): room air        REVIEW OF SYSTEMS:  CONSTITUTIONAL: No fever, weight loss, or fatigue  RESPIRATORY: No cough, wheezing, chills or hemoptysis; No shortness of breath  CARDIOVASCULAR: No chest pain or chest pressure.    No palpitations, dizziness, light headedness, syncope or near syncope.  No edema, no orthopnea.   NEUROLOGICAL: No headaches, memory loss, loss of strength, numbness, or tremors      PHYSICAL EXAM  Constitutional:  WDWN. No acute distress  HEENT: normocephalic, atraumatic.  PERRLA. EOMI  Neck : + JVD   Lungs:  clear to auscultation bilaterally. no rhonchi. no wheezing  Heart:  S1 and S2. No S3, S4. I/VI systolic murmur.  Abdomen:  soft, non tender.  Extremities: No clubbing, cyanoisis or edema  Nuerologic:  A+O x 3. No focal deficits  Skin:  no rashes        LABS:                        12.1   5.48  )-----------( 150      ( 23 May 2024 06:37 )             35.9     05-23    141  |  102  |  60<H>  ----------------------------<  102<H>  3.9   |  27  |  2.11<H>    Ca    9.2      23 May 2024 06:37  Phos  5.1     05-23  Mg     2.3     05-23    TPro  7.5  /  Alb  3.3  /  TBili  1.9<H>  /  DBili  0.8<H>  /  AST  46<H>  /  ALT  37  /  AlkPhos  316<H>  05-23    I&O's Summary    23 May 2024 07:01  -  23 May 2024 11:14  --------------------------------------------------------  IN: 100 mL / OUT: 300 mL / NET: -200 mL    < from: TTE Echo Complete w/o Contrast w/ Doppler (04.07.24 @ 11:12) >   1. Left ventricular ejection fraction, by visual estimation, is 25 to   30%.   2. Severely decreased global left ventricular systolic function.   3. Left atrial enlargement.   4. Increased LV wall thickness.   5. Spectral Dopplershows impaired relaxation pattern of left   ventricular myocardial filling (Grade I diastolic dysfunction).   6. There is mild concentric left ventricular hypertrophy.   7. Mildly enlarged right ventricle.   8. Right atrial enlargement.   9. Mild to moderate mitral valve regurgitation.  10. Moderate tricuspid regurgitation.  11. Mild aortic regurgitation.  12. Mild pulmonic valve regurgitation.    < end of copied text >          < from: 12 Lead ECG (05.22.24 @ 14:25) >  Diagnosis Line Sinus rhythm [id number] degree AV block with frequent premature ventricular complexes  Possible Left atrial enlargement  Left axis deviation  Anteroseptal infarct (cited on or before 11-MAY-2022)  Abnormal ECG  When compared with ECG of 19-APR-2024 14:39,  Premature ventricular complexes are now present    < end of copied text >

## 2024-05-23 NOTE — DISCHARGE NOTE PROVIDER - NSDCCPCAREPLAN_GEN_ALL_CORE_FT
PRINCIPAL DISCHARGE DIAGNOSIS  Diagnosis: Acute on chronic systolic congestive heart failure  Assessment and Plan of Treatment: You came here with 2 weeks weight gain, associated with shortness of breath with activity, and increased abdominal girth despite compliance with bumex. Your labs showed high cardiac enzymes and your chest XR showed an enlarged heart with water in the lungs. You were given bumex and admitted for Acute Decompensated Heart Failure and also acute kidney injury. Your echo showed a low ejection fraction/poor pumping. Cardiology saw you and recommended beta blockers and restart goal directed medical therapy when acute kidney injury resolves. Your abdominal ultrasound returned showing gallstones, with gallbladder thickening and ultrasound kidney bladder returned showing enlarged prostate and a chronic bladder outlet obstruction. Nephrology also evaluated you. Today you are stable and feeling better, with no respiratory issues, and will be discharged. Please take your medications as indicated and follow up with your primary care provider and cardiologist. Thank you.       PRINCIPAL DISCHARGE DIAGNOSIS  Diagnosis: Acute on chronic systolic congestive heart failure  Assessment and Plan of Treatment: You came here with 2 weeks weight gain, associated with shortness of breath with activity, and increased abdominal girth despite compliance with bumex. Your labs showed high cardiac enzymes and your chest XR showed an enlarged heart with water in the lungs. You were given bumex and admitted for Acute Decompensated Heart Failure and also acute kidney injury. Your echo showed a low ejection fraction/poor pumping. Cardiology saw you and recommended beta blockers. Given your renal function, nephrology does not want you to restart ACE/ARB/ARNI/spironolactone at this time. If your kidney function improves, this can be reconsidered outpatient. Please follow up with your heart failure specialist. You are a good candidate for a cardiomems device which tracks your heart failure in order to better control your diuretic medications. If your heart function (EF) does not improve with medications, we may need to consider placing an AICD which is a defibrillator. Your abdominal ultrasound returned showing gallstones, with gallbladder thickening and ultrasound kidney bladder returned showing enlarged prostate and a chronic bladder outlet obstruction. Today you are stable and feeling better, with no respiratory issues, and will be discharged. Please take your medications as indicated and follow up with your primary care provider and cardiologist. Thank you.       PRINCIPAL DISCHARGE DIAGNOSIS  Diagnosis: Acute on chronic systolic congestive heart failure  Assessment and Plan of Treatment: You came here with 2 weeks weight gain, associated with shortness of breath with activity, and increased abdominal girth despite compliance with bumex. Your labs showed high cardiac enzymes and your chest XR showed an enlarged heart with water in the lungs. You were given bumex and admitted for Acute Decompensated Heart Failure and also acute kidney injury. Your echo showed a low ejection fraction/poor pumping. Cardiology saw you and recommended beta blockers. Given your renal function, nephrology does not want you to restart ACE/ARB/ARNI/spironolactone at this time. If your kidney function improves, this can be reconsidered outpatient. Please follow up with your heart failure specialist. You are a good candidate for a cardiomems device which tracks your heart failure in order to better control your diuretic medications. If your heart function (EF) does not improve with medications, we may need to consider placing an AICD which is a defibrillator. Your abdominal ultrasound returned showing gallstones, with gallbladder thickening and ultrasound kidney bladder returned showing enlarged prostate and a chronic bladder outlet obstruction. Today you are feeling better, with no respiratory issues, and will be discharged. Please take your medications as indicated and follow up with your primary care provider, heart failure specialist and cardiologist. Thank you.       PRINCIPAL DISCHARGE DIAGNOSIS  Diagnosis: Acute on chronic systolic congestive heart failure  Assessment and Plan of Treatment: You came here with 2 weeks weight gain, associated with shortness of breath with activity, and increased abdominal girth despite compliance with bumex. Your labs showed high cardiac enzymes and your chest XR showed an enlarged heart with water in the lungs. You were given bumex and admitted for Acute Decompensated Heart Failure and also acute kidney injury. Your echo showed a low ejection fraction/poor pumping. Cardiology saw you and recommended beta blockers. Given your renal function, nephrology does not want you to restart ACE/ARB/ARNI/spironolactone at this time. If your kidney function improves, this can be reconsidered outpatient. Please follow up with your heart failure specialist. You are a good candidate for a cardiomems device which tracks your heart failure in order to better control your diuretic medications. If your heart function (EF) does not improve with medications, we may need to consider placing an AICD which is a defibrillator. Your abdominal ultrasound returned showing gallstones, with gallbladder thickening and ultrasound kidney bladder returned showing enlarged prostate and a chronic bladder outlet obstruction. Today you are feeling better, with no respiratory issues, and will be discharged. Please take your medications as indicated and follow up with your primary care provider, heart failure specialist and cardiologist. Thank you.        SECONDARY DISCHARGE DIAGNOSES  Diagnosis: Elevated bilirubin  Assessment and Plan of Treatment: You bilirubin was elevated. you were seen by the GI team who assessed your labs and imaging and detrmiend that this is likely from your heart failure making your liver congested. Please follow up the liver numbers and bilirubin with your primary care doctor.    Diagnosis: Adrenal adenoma  Assessment and Plan of Treatment: You CT scan had an incidental finding of 2.4 cm left adrenal adenoma. PLease follow this up with your primary care doctor.    Diagnosis: BRODY (acute kidney injury)  Assessment and Plan of Treatment: You had kidney injury in [facility] hosptial preventing us from putting your on all of yur heart failure medications. We indlucded the name of the nephrologist who saw your here in the hospital. You CT also had an incidental finding of a right renal cyst with peripheral calcification and too small to characterize cortical hypodensity.   Please follow up with your nephrologist       PRINCIPAL DISCHARGE DIAGNOSIS  Diagnosis: Acute on chronic systolic congestive heart failure  Assessment and Plan of Treatment: You came here with 2 weeks weight gain, associated with shortness of breath with activity, and increased abdominal girth despite compliance with bumex. Your labs showed high cardiac enzymes and your chest XR showed an enlarged heart with water in the lungs. You were given bumex and admitted for Acute Decompensated Heart Failure and also acute kidney injury. Your echo showed a low ejection fraction/poor pumping. Cardiology saw you and recommended beta blockers. Given your renal function, nephrology does not want you to restart ACE/ARB/ARNI/spironolactone at this time. If your kidney function improves, this can be reconsidered outpatient. Please follow up with your heart failure specialist. You are a good candidate for a cardiomems device which tracks your heart failure in order to better control your diuretic medications. If your heart function (EF) does not improve with medications, we may need to consider placing an AICD which is a defibrillator. Your abdominal ultrasound returned showing gallstones, with gallbladder thickening and ultrasound kidney bladder returned showing enlarged prostate and a chronic bladder outlet obstruction. Today you are feeling better, with no respiratory issues, and will be discharged. Please take your medications as indicated and follow up with your primary care provider, heart failure specialist and cardiologist. Thank you.        SECONDARY DISCHARGE DIAGNOSES  Diagnosis: Elevated bilirubin  Assessment and Plan of Treatment: You bilirubin was elevated. you were seen by the GI team who assessed your labs and imaging and detrmiend that this is likely from your heart failure making your liver congested. Please follow up the liver numbers and bilirubin with your primary care doctor.    Diagnosis: Adrenal adenoma  Assessment and Plan of Treatment: You CT scan had an incidental finding of 2.4 cm left adrenal adenoma. PLease follow this up with your primary care doctor.    Diagnosis: BRODY (acute kidney injury)  Assessment and Plan of Treatment: You had kidney injury in [facility] hosptial preventing us from putting your on all of yur heart failure medications. We included the name of the nephrologist who saw your here in the hospital. You CT also had an incidental finding of a right renal cyst with peripheral calcification and too small to characterize cortical hypodensity.   Please follow up with your nephrologist.        PRINCIPAL DISCHARGE DIAGNOSIS  Diagnosis: Acute on chronic systolic congestive heart failure  Assessment and Plan of Treatment: You came here with 2 weeks weight gain, associated with shortness of breath with activity, and increased abdominal girth despite compliance with bumex. Your labs showed high cardiac enzymes and your chest XR showed an enlarged heart with water in the lungs. You were given bumex and admitted for Acute Decompensated Heart Failure and also acute kidney injury. Your echo showed a low ejection fraction/poor pumping. Cardiology saw you and recommended beta blockers. Given your renal function, nephrology does not want you to restart ACE/ARB/ARNI/spironolactone at this time. If your kidney function improves, this can be reconsidered outpatient. Please follow up with your heart failure specialist. You are a good candidate for a cardiomems device which tracks your heart failure in order to better control your diuretic medications. If your heart function (EF) does not improve with medications, we may need to consider placing an AICD which is a defibrillator. Your abdominal ultrasound returned showing gallstones, with gallbladder thickening and ultrasound kidney bladder returned showing enlarged prostate and a chronic bladder outlet obstruction. Today you are feeling better, with no respiratory issues, and will be discharged. Please take your medications as indicated and follow up with your primary care provider, heart failure specialist and cardiologist. Thank you.        SECONDARY DISCHARGE DIAGNOSES  Diagnosis: Elevated bilirubin  Assessment and Plan of Treatment: You bilirubin was elevated. you were seen by the GI team who assessed your labs and imaging and detrmiend that this is likely from your heart failure making your liver congested.   You also have gallstones but no pain, so surgery was not done to remove your gallbladder. You may need it removed in the future if you develop symptoms.   Please follow up the liver numbers and bilirubin with your primary care doctor.    Diagnosis: Adrenal adenoma  Assessment and Plan of Treatment: You CT scan had an incidental finding of 2.4 cm left adrenal adenoma. PLease follow this up with your primary care doctor.    Diagnosis: BRODY (acute kidney injury)  Assessment and Plan of Treatment: You had kidney injury in [facility] hosptial preventing us from putting your on all of yur heart failure medications. We included the name of the nephrologist who saw your here in the hospital. You CT also had an incidental finding of a right renal cyst with peripheral calcification and too small to characterize cortical hypodensity.   Please follow up with your nephrologist.       Diagnosis: Lung nodule  Assessment and Plan of Treatment: Please follow up with Dr. Alejandre

## 2024-05-23 NOTE — DISCHARGE NOTE PROVIDER - PROVIDER TOKENS
PROVIDER:[TOKEN:[[id number]]] PROVIDER:[TOKEN:[[id number]]],PROVIDER:[TOKEN:[[id number]]]

## 2024-05-23 NOTE — CHART NOTE - NSCHARTNOTEFT_GEN_A_CORE
Contacted by RN. Patient requesting ambien for insomnia. Med rec reviewed, could not confirm this is a home medication.   Discussed melatonin, order in chart

## 2024-05-23 NOTE — CHART NOTE - NSCHARTNOTEFT_GEN_A_CORE
spoke with pt and wife. Wife states the patient was hospitalized for 10 days in February 2024 due to "fluids in the lung" requiring diuresis. According pt's wife, pt was taken off on metoprolol, entresto, farxiga, and spironolactone upon discharge. Pt was placed back on farxiga and spironolactone only by Dr. Shell (last seen on 4/2024).     Wife also reports, pt takes tamsulosin .4mg and .8mg on alternating days, Prozac 20mg ever 3 days (per pt's psychiatrist Dr Yu). spoke with pt and wife. Wife states the patient was hospitalized for 10 days in February 2024 due to "fluids in the lung" requiring diuresis. According pt's wife, pt was taken off on metoprolol, entresto, farxiga, and spironolactone upon discharge. Wife states pt was placed back on farxiga and spironolactone only by Dr. Shell (last seen on 4/2024).     Wife also reports, pt takes tamsulosin .4mg and .8mg on alternating days, Prozac 20mg ever 3 days (per pt's psychiatrist Dr Yu).

## 2024-05-23 NOTE — PROGRESS NOTE ADULT - SUBJECTIVE AND OBJECTIVE BOX
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    Overnight Events:   Interval History: Patient was seen and examined by me this morning at bedside.     REVIEW OF SYSTEMS:  CONSTITUTIONAL: No weakness, fevers or chills  EYES/ENT: No visual changes;  No vertigo or throat pain   NECK: No pain or stiffness  RESPIRATORY: No cough, wheezing, hemoptysis; No shortness of breath  CARDIOVASCULAR: No chest pain or palpitations  GASTROINTESTINAL: No abdominal or epigastric pain. No nausea, vomiting; No diarrhea or constipation.   GENITOURINARY: No dysuria, frequency or hematuria  NEUROLOGICAL: No numbness or weakness  SKIN: No itching, burning, rashes, or lesions       Vital Signs Last 24 Hrs  T(C): 36.4 (23 May 2024 04:59), Max: 36.7 (22 May 2024 13:59)  T(F): 97.6 (23 May 2024 04:59), Max: 98.1 (22 May 2024 13:59)  HR: 75 (23 May 2024 04:59) (75 - 98)  BP: 100/55 (23 May 2024 04:59) (100/55 - 129/82)  BP(mean): 100 (22 May 2024 19:30) (74 - 100)  RR: 18 (23 May 2024 04:59) (14 - 20)  SpO2: 96% (23 May 2024 04:59) (94% - 97%)    Parameters below as of 23 May 2024 04:59  Patient On (Oxygen Delivery Method): room air        I&O's Summary      PHYSICAL EXAM  Constitutional: Pt lying in bed, awake and alert, NAD  HEENT: EOMI, normocephalic, moist mucous membranes  Neck: Soft and supple  Respiratory: CTABL, No wheezing, rales or rhonchi  Cardiovascular: S1S2+, RRR, no M/G/R  Gastrointestinal: BS+, soft, NT/ND, no guarding, no rebound  Extremities: No calf pain  Vascular: Peripheral pulses present  Neurological: AAOx3, no focal deficits  Musculoskeletal: Normal muscle tone, no atrophy, no rigidity, no contractions  Skin: No significant new skin lesions or rashes    MEDICATIONS:  MEDICATIONS  (STANDING):  aspirin enteric coated 81 milliGRAM(s) Oral daily  atorvastatin 80 milliGRAM(s) Oral at bedtime  buMETAnide Injectable 1 milliGRAM(s) IV Push two times a day  clopidogrel Tablet 75 milliGRAM(s) Oral daily  FLUoxetine 20 milliGRAM(s) Oral <User Schedule>  heparin   Injectable 5000 Unit(s) SubCutaneous every 8 hours  mirtazapine 15 milliGRAM(s) Oral at bedtime  tamsulosin 0.8 milliGRAM(s) Oral <User Schedule>  tamsulosin 0.4 milliGRAM(s) Oral <User Schedule>    MEDICATIONS  (PRN):  acetaminophen     Tablet .. 650 milliGRAM(s) Oral every 6 hours PRN Temp greater or equal to 38C (100.4F), Mild Pain (1 - 3)  aluminum hydroxide/magnesium hydroxide/simethicone Suspension 30 milliLiter(s) Oral every 4 hours PRN Dyspepsia  melatonin 3 milliGRAM(s) Oral at bedtime PRN Insomnia  ondansetron Injectable 4 milliGRAM(s) IV Push every 8 hours PRN Nausea and/or Vomiting      LABS: All Labs Reviewed:                        11.6   5.73  )-----------( 158      ( 22 May 2024 14:31 )             35.8     05-22    136  |  100  |  56<H>  ----------------------------<  94  4.2   |  26  |  1.95<H>    Ca    9.3      22 May 2024 14:31    TPro  7.5  /  Alb  3.3  /  TBili  1.9<H>  /  DBili  x   /  AST  44<H>  /  ALT  33  /  AlkPhos  283<H>  05-22          Blood Culture:   CAPILLARY BLOOD GLUCOSE            RADIOLOGY/EKG (personally reviewed):   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    Overnight Events: None  Interval History: Patient was seen and examined by me this morning at bedside. Says he feels significantly better this AM    REVIEW OF SYSTEMS:  CONSTITUTIONAL: No weakness, fevers or chills  EYES/ENT: No visual changes;  No vertigo or throat pain   NECK: No pain or stiffness  RESPIRATORY: No cough, wheezing, hemoptysis; No shortness of breath  CARDIOVASCULAR: No chest pain or palpitations  GASTROINTESTINAL: No abdominal or epigastric pain. No nausea, vomiting; No diarrhea or constipation.   GENITOURINARY: No dysuria, frequency or hematuria  NEUROLOGICAL: No numbness or weakness  SKIN: No itching, burning, rashes, or lesions       Vital Signs Last 24 Hrs  T(C): 36.4 (23 May 2024 04:59), Max: 36.7 (22 May 2024 13:59)  T(F): 97.6 (23 May 2024 04:59), Max: 98.1 (22 May 2024 13:59)  HR: 75 (23 May 2024 04:59) (75 - 98)  BP: 100/55 (23 May 2024 04:59) (100/55 - 129/82)  BP(mean): 100 (22 May 2024 19:30) (74 - 100)  RR: 18 (23 May 2024 04:59) (14 - 20)  SpO2: 96% (23 May 2024 04:59) (94% - 97%)    Parameters below as of 23 May 2024 04:59  Patient On (Oxygen Delivery Method): room air        I&O's Summary      PHYSICAL EXAM  Constitutional: Pt lying in bed, awake and alert, NAD  HEENT: EOMI, normocephalic, moist mucous membranes  Neck: Soft and supple  Respiratory: CTABL, No wheezing, rales or rhonchi  Cardiovascular: S1S2+, RRR, no M/G/R  Gastrointestinal: BS+, soft, NT/ND, no guarding, no rebound  Extremities: No calf pain, 1+ symmetrical b/l LE edema,   Vascular: Peripheral pulses present, b/l LE varicosities  Neurological: AAOx3, no focal deficits  Musculoskeletal: Normal muscle tone, no atrophy, no rigidity, no contractions    MEDICATIONS:  MEDICATIONS  (STANDING):  aspirin enteric coated 81 milliGRAM(s) Oral daily  atorvastatin 80 milliGRAM(s) Oral at bedtime  buMETAnide Injectable 1 milliGRAM(s) IV Push two times a day  clopidogrel Tablet 75 milliGRAM(s) Oral daily  FLUoxetine 20 milliGRAM(s) Oral <User Schedule>  heparin   Injectable 5000 Unit(s) SubCutaneous every 8 hours  mirtazapine 15 milliGRAM(s) Oral at bedtime  tamsulosin 0.8 milliGRAM(s) Oral <User Schedule>  tamsulosin 0.4 milliGRAM(s) Oral <User Schedule>    MEDICATIONS  (PRN):  acetaminophen     Tablet .. 650 milliGRAM(s) Oral every 6 hours PRN Temp greater or equal to 38C (100.4F), Mild Pain (1 - 3)  aluminum hydroxide/magnesium hydroxide/simethicone Suspension 30 milliLiter(s) Oral every 4 hours PRN Dyspepsia  melatonin 3 milliGRAM(s) Oral at bedtime PRN Insomnia  ondansetron Injectable 4 milliGRAM(s) IV Push every 8 hours PRN Nausea and/or Vomiting      LABS: All Labs Reviewed:              12.1                 141  | 27   | 60           5.48  >-----------< 150     ------------------------< 102                   35.9                 3.9  | 102  | 2.11                                         Ca 9.2   Mg x     Ph x      Urinalysis Basic - ( 23 May 2024 06:37 )    Color: x / Appearance: x / SG: x / pH: x  Gluc: 102 mg/dL / Ketone: x  / Bili: x / Urobili: x   Blood: x / Protein: x / Nitrite: x   Leuk Esterase: x / RBC: x / WBC x   Sq Epi: x / Non Sq Epi: x / Bacteria: x    RADIOLOGY/EKG (personally reviewed):  < from: 12 Lead ECG (05.22.24 @ 14:25) >  Diagnosis Line Sinus rhythm [id number] degree AV block with frequent premature ventricular complexes  Possible Left atrial enlargement  Left axis deviation  Anteroseptal infarct (cited on or before 11-MAY-2022)  Abnormal ECG  When compared with ECG of 19-APR-2024 14:39,  Premature ventricular complexes are now present  Confirmed by Luis Carlos Gilliam ([id number]) on 5/22/2024 3:52:42 PM    < end of copied text >  < from: Xray Chest 1 View- PORTABLE-Urgent (05.22.24 @ 15:09) >  IMPRESSION: Small right base effusion new since prior. Other findings   stable.    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    Overnight Events: None  Interval History: Patient was seen and examined by me this morning at bedside. Says he feels significantly better this AM    REVIEW OF SYSTEMS:  CONSTITUTIONAL: No weakness, fevers or chills  EYES/ENT: No visual changes;  No vertigo or throat pain   NECK: No pain or stiffness  RESPIRATORY: No cough, wheezing, hemoptysis; No shortness of breath  CARDIOVASCULAR: No chest pain or palpitations  GASTROINTESTINAL: No abdominal or epigastric pain. No nausea, vomiting; No diarrhea or constipation.   GENITOURINARY: No dysuria, frequency or hematuria  NEUROLOGICAL: No numbness or weakness  SKIN: No itching, burning, rashes, or lesions       Vital Signs Last 24 Hrs  T(C): 36.4 (23 May 2024 04:59), Max: 36.7 (22 May 2024 13:59)  T(F): 97.6 (23 May 2024 04:59), Max: 98.1 (22 May 2024 13:59)  HR: 75 (23 May 2024 04:59) (75 - 98)  BP: 100/55 (23 May 2024 04:59) (100/55 - 129/82)  BP(mean): 100 (22 May 2024 19:30) (74 - 100)  RR: 18 (23 May 2024 04:59) (14 - 20)  SpO2: 96% (23 May 2024 04:59) (94% - 97%)    Parameters below as of 23 May 2024 04:59  Patient On (Oxygen Delivery Method): room air    PHYSICAL EXAM  Constitutional: Pt lying in bed, awake and alert, NAD  HEENT: EOMI, normocephalic, moist mucous membranes  Neck: Soft and supple  Respiratory: CTABL, No wheezing, rales or rhonchi  Cardiovascular: S1S2+, no M/G/R  Gastrointestinal: BS+, soft, NT, no guarding, no rebound +distended (-) fluid wave  Extremities: No calf pain, 1+ symmetrical b/l LE edema,   Vascular: Peripheral pulses present, b/l LE varicosities  Neurological: AAOx3, no focal deficits  Musculoskeletal: Normal muscle tone, no atrophy, no rigidity, no contractions    MEDICATIONS:  MEDICATIONS  (STANDING):  aspirin enteric coated 81 milliGRAM(s) Oral daily  atorvastatin 80 milliGRAM(s) Oral at bedtime  buMETAnide Injectable 1 milliGRAM(s) IV Push two times a day  clopidogrel Tablet 75 milliGRAM(s) Oral daily  FLUoxetine 20 milliGRAM(s) Oral <User Schedule>  heparin   Injectable 5000 Unit(s) SubCutaneous every 8 hours  mirtazapine 15 milliGRAM(s) Oral at bedtime  tamsulosin 0.8 milliGRAM(s) Oral <User Schedule>  tamsulosin 0.4 milliGRAM(s) Oral <User Schedule>    MEDICATIONS  (PRN):  acetaminophen     Tablet .. 650 milliGRAM(s) Oral every 6 hours PRN Temp greater or equal to 38C (100.4F), Mild Pain (1 - 3)  aluminum hydroxide/magnesium hydroxide/simethicone Suspension 30 milliLiter(s) Oral every 4 hours PRN Dyspepsia  melatonin 3 milliGRAM(s) Oral at bedtime PRN Insomnia  ondansetron Injectable 4 milliGRAM(s) IV Push every 8 hours PRN Nausea and/or Vomiting      LABS: All Labs Reviewed:              12.1                 141  | 27   | 60           5.48  >-----------< 150     ------------------------< 102                   35.9                 3.9  | 102  | 2.11                                         Ca 9.2   Mg x     Ph x      Urinalysis Basic - ( 23 May 2024 06:37 )    Color: x / Appearance: x / SG: x / pH: x  Gluc: 102 mg/dL / Ketone: x  / Bili: x / Urobili: x   Blood: x / Protein: x / Nitrite: x   Leuk Esterase: x / RBC: x / WBC x   Sq Epi: x / Non Sq Epi: x / Bacteria: x    RADIOLOGY/EKG (personally reviewed):  < from: 12 Lead ECG (05.22.24 @ 14:25) >  Diagnosis Line Sinus rhythm [id number] degree AV block with frequent premature ventricular complexes  Possible Left atrial enlargement  Left axis deviation  Anteroseptal infarct (cited on or before 11-MAY-2022)  Abnormal ECG  When compared with ECG of 19-APR-2024 14:39,  Premature ventricular complexes are now present  Confirmed by Luis Carlos Gilliam ([id number]) on 5/22/2024 3:52:42 PM    < end of copied text >  < from: Xray Chest 1 View- PORTABLE-Urgent (05.22.24 @ 15:09) >  IMPRESSION: Small right base effusion new since prior. Other findings   stable.    < end of copied text >

## 2024-05-23 NOTE — DISCHARGE NOTE PROVIDER - NSDCCAREPROVSEEN_GEN_ALL_CORE_FT
Aguila, Kena Barajas, Hiren Bowser Ma. Dilan Ardon, Jarad Martinez, Ivon Gilliam, [facility]  Ava, Bridger Canales, Riri Romeo, Melissa Mota, Becka

## 2024-05-23 NOTE — DISCHARGE NOTE PROVIDER - NSDCMRMEDTOKEN_GEN_ALL_CORE_FT
aspirin 81 mg oral delayed release tablet: 1 tab(s) orally once a day  bumetanide 1 mg oral tablet: 1 tab(s) orally once a day Takes alternating 1mg and 2mg  clopidogrel 75 mg oral tablet: 1 tab(s) orally once a day  Entresto 24 mg-26 mg oral tablet: 1 tab(s) orally once a day  Farxiga 5 mg oral tablet: 1 tab(s) orally once a day  Flomax 0.4 mg oral capsule: 1 cap(s) orally once a day Takes alternating 1 cap and 2 cap  FLUoxetine (Eqv-PROzac) 20 mg oral tablet: 1 tab(s) orally once a day  Jardiance 10 mg oral tablet: 1 tab(s) orally once a day  latanoprost 0.005% ophthalmic solution: 1 drop(s) in each eye once a day  metoprolol succinate 25 mg oral tablet, extended release: 1 tab(s) orally once a day  mirtazapine 15 mg oral tablet: 1 tab(s) orally once a day (at bedtime)  rosuvastatin 20 mg oral tablet: 1 tab(s) orally once a day (at bedtime)  spironolactone 25 mg oral tablet: 0.5 tab(s) orally once a day   aspirin 81 mg oral delayed release tablet: 1 tab(s) orally once a day  bumetanide 1 mg oral tablet: 1 tab(s) orally once a day  clopidogrel 75 mg oral tablet: 1 tab(s) orally once a day  Farxiga 5 mg oral tablet: 1 tab(s) orally once a day  Flomax 0.4 mg oral capsule: 1 cap(s) orally once a day Takes alternating 1 cap and 2 cap  FLUoxetine (Eqv-PROzac) 20 mg oral tablet: 1 tab(s) orally every 3 days  latanoprost 0.005% ophthalmic solution: 1 drop(s) in each eye once a day  mirtazapine 15 mg oral tablet: 1 tab(s) orally once a day (at bedtime)  rosuvastatin 20 mg oral tablet: 1 tab(s) orally once a day (at bedtime)  spironolactone 25 mg oral tablet: 0.5 tab(s) orally once a day   aspirin 81 mg oral delayed release tablet: 1 tab(s) orally once a day  bumetanide 2 mg oral tablet: 1 tab(s) orally once a day  clopidogrel 75 mg oral tablet: 1 tab(s) orally once a day  Flomax 0.4 mg oral capsule: 1 cap(s) orally once a day Takes alternating 1 cap and 2 cap  FLUoxetine (Eqv-PROzac) 20 mg oral tablet: 1 tab(s) orally every 3 days  latanoprost 0.005% ophthalmic solution: 1 drop(s) in each eye once a day  metoprolol succinate 25 mg oral tablet, extended release: 1 tab(s) orally once a day  mirtazapine 15 mg oral tablet: 1 tab(s) orally once a day (at bedtime)  rosuvastatin 20 mg oral tablet: 1 tab(s) orally once a day (at bedtime)  temazepam 15 mg oral capsule: 1 cap(s) orally once a day (at bedtime) as needed for  insomnia

## 2024-05-23 NOTE — DISCHARGE NOTE PROVIDER - HOSPITAL COURSE
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 In the ED, pt was afebrile, and HDS however labs were notable for BNP 4697 with BRODY on CKD w/ BUN/Cr 56/1.95, t bili 1.9, alk phos 283. CXR showed enlarged heart,  persistent dense area coming off the superior left hilum and   proceeding superolaterally into the chest, persistent small densities of the rt lower hillum, and a small right base pleural effusion. Pt was given bumex and admitted for Acute Decompensated Heart Failure. TTE came back with EF 25-30, severe systolic dysfunction, grade 1 diastolic dysfunction, RV/RA/LA enlargement, TR/AR/[place]. Cardiology saw pt who recommended beta blockers and restart GDMT when BRODY resolves. Abdominal US returned showing gallstones, with gallbladder thickening and US kidney bladder returned enlarged prostate and a chronic bladder outlet obstruction. Nephrology also evaluated the pt. Today, pt is feeling better, with no respiratory issues, and hemodynamically stable. He will be discharged for outpt follow up with his PCP and cardiologist.    5/23/24: No overnight events. Pt is feeling good today and endorses good breathing.      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 In the ED, pt was afebrile, and HDS however labs were notable for BNP 4697 with BRODY on CKD w/ BUN/Cr 56/1.95, t bili 1.9, alk phos 283. CXR showed enlarged heart,  persistent dense area coming off the superior left hilum and   proceeding superolaterally into the chest, persistent small densities of the rt lower hillum, and a small right base pleural effusion. Pt was given bumex and admitted for Acute Decompensated Heart Failure. TTE came back with EF 25-30, severe systolic dysfunction, grade 1 diastolic dysfunction, RV/RA/LA enlargement, TR/AR/[place]. Cardiology saw pt who recommended beta blockers and restart GDMT when BRODY resolves. Abdominal US returned showing gallstones, with gallbladder thickening and US kidney bladder returned enlarged prostate and a chronic bladder outlet obstruction. Nephrology also evaluated the pt. Today, pt is feeling better, with no respiratory issues, and hemodynamically stable. He will be discharged for outpt follow up with his PCP and cardiologist.    5/23/24: No overnight events. Pt is feeling good today and endorses good breathing. Denies fevers, chills, SOB, cp, palpitations. No other urgent acute sxs endorsed.  ROS as stated above.    Vital Signs Last 24 Hrs  T(C): 36.4 (23 May 2024 04:59), Max: 36.7 (22 May 2024 13:59)  T(F): 97.6 (23 May 2024 04:59), Max: 98.1 (22 May 2024 13:59)  HR: 75 (23 May 2024 04:59) (75 - 98)  BP: 100/55 (23 May 2024 04:59) (100/55 - 129/82)  BP(mean): 100 (22 May 2024 19:30) (74 - 100)  RR: 18 (23 May 2024 04:59) (14 - 20)  SpO2: 96% (23 May 2024 04:59) (94% - 97%)    Parameters below as of 23 May 2024 04:59  Patient On (Oxygen Delivery Method): room air    PHYSICAL EXAM  Constitutional: Pt lying in bed, awake and alert, NAD  HEENT: EOMI, normocephalic, moist mucous membranes  Neck: Soft and supple  Respiratory: CTABL, No wheezing, rales or rhonchi  Cardiovascular: S1S2+, no M/G/R  Gastrointestinal: BS+, soft, NT, no guarding, no rebound +distended (-) fluid wave  Extremities: No calf pain, 1+ symmetrical b/l LE edema,   Vascular: Peripheral pulses present, b/l LE varicosities  Neurological: AAOx3, no focal deficits  Musculoskeletal: Normal muscle tone, no atrophy, no rigidity, no contractions    IMAGING:  < from: Xray Chest 1 View- PORTABLE-Urgent (05.22.24 @ 15:09) >  Heart is enlarged.  There is a persistent dense area coming off the superior left hilum and   proceeding superolaterally into the chest.  There are some persistent small densities off the right lower hilum.  The above findings are similar to April 19 this year.  Presentlythere is a small right base effusion which is new.  IMPRESSION: Small right base effusion new since prior. Other findings     < from: US Abdomen Upper Quadrant Right (05.23.24 @ 11:25) >  IMPRESSION:  Gallstones, gallbladder mural thickening without palpable tenderness, not   significantly changed from 5/5/2024 , when allowing for differences in   modality. . Clinical correlation may determine need for hepatobiliary   scintigraphy    < from: US Kidney and Bladder (05.23.24 @ 12:53) >  IMPRESSION:  Enlarged prostate and a possible chronic bladder outlet obstruction with   43 cc of post void residual.  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 In the ED, pt was afebrile, and HDS however labs were notable for BNP 4697 with BRODY on CKD w/ BUN/Cr 56/1.95, t bili 1.9, alk phos 283. CXR showed enlarged heart,  persistent dense area coming off the superior left hilum and   proceeding superolaterally into the chest, persistent small densities of the rt lower hillum, and a small right base pleural effusion. Pt was given bumex and admitted for Acute Decompensated Heart Failure. TTE came back with EF 25-30, severe systolic dysfunction, grade 1 diastolic dysfunction, RV/RA/LA enlargement, TR/AR/[place]. Cardiology saw pt who recommended beta blockers and restart GDMT when BRODY resolves. Abdominal US returned showing gallstones, with gallbladder thickening and US kidney bladder returned enlarged prostate and a chronic bladder outlet obstruction. Nephrology also evaluated the pt. Today, pt is feeling better, with no respiratory issues, and hemodynamically stable. He will be discharged for outpt follow up with his PCP and cardiologist.    5/23/24: No overnight events. Pt is feeling good today and endorses good breathing. Denies fevers, chills, SOB, cp, palpitations. No other urgent acute sxs endorsed.  ROS as stated above.    Vital Signs Last 24 Hrs  T(C): 36.4 (23 May 2024 04:59), Max: 36.7 (22 May 2024 13:59)  T(F): 97.6 (23 May 2024 04:59), Max: 98.1 (22 May 2024 13:59)  HR: 75 (23 May 2024 04:59) (75 - 98)  BP: 100/55 (23 May 2024 04:59) (100/55 - 129/82)  BP(mean): 100 (22 May 2024 19:30) (74 - 100)  RR: 18 (23 May 2024 04:59) (14 - 20)  SpO2: 96% (23 May 2024 04:59) (94% - 97%)    Parameters below as of 23 May 2024 04:59  Patient On (Oxygen Delivery Method): room air    PHYSICAL EXAM  Constitutional: Pt lying in bed, awake and alert, NAD  HEENT: EOMI, normocephalic, moist mucous membranes  Neck: Soft and supple  Respiratory: CTABL, No wheezing, rales or rhonchi  Cardiovascular: S1S2+, no M/G/R  Gastrointestinal: BS+, soft, NT, no guarding, no rebound +distended (-) fluid wave  Extremities: No calf pain, 1+ symmetrical b/l LE edema,   Vascular: Peripheral pulses present, b/l LE varicosities  Neurological: AAOx3, no focal deficits  Musculoskeletal: Normal muscle tone, no atrophy, no rigidity, no contractions    IMAGING:  < from: Xray Chest 1 View- PORTABLE-Urgent (05.22.24 @ 15:09) >  Heart is enlarged.  There is a persistent dense area coming off the superior left hilum and   proceeding superolaterally into the chest.  There are some persistent small densities off the right lower hilum.  The above findings are similar to April 19 this year.  Presentlythere is a small right base effusion which is new.  IMPRESSION: Small right base effusion new since prior. Other findings     < from: US Abdomen Upper Quadrant Right (05.23.24 @ 11:25) >  IMPRESSION:  Gallstones, gallbladder mural thickening without palpable tenderness, not   significantly changed from 5/5/2024 , when allowing for differences in   modality. . Clinical correlation may determine need for hepatobiliary   scintigraphy    < from: US Kidney and Bladder (05.23.24 @ 12:53) >  IMPRESSION:  Enlarged prostate and a possible chronic bladder outlet obstruction with   43 cc of post void residual.  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 In the ED, pt was afebrile, and HDS however labs were notable for BNP 4697 with BRODY on CKD w/ BUN/Cr 56/1.95, t bili 1.9, alk phos 283. CXR showed enlarged heart,  persistent dense area coming off the superior left hilum and   proceeding superolaterally into the chest, persistent small densities of the rt lower hillum, and a small right base pleural effusion. Pt was given bumex and admitted for Acute Decompensated Heart Failure. TTE came back with EF 25-30, severe systolic dysfunction, grade 1 diastolic dysfunction, RV/RA/LA enlargement, TR/AR/[place]. Cardiology saw pt who recommended beta blockers and restart GDMT when BRODY resolves. Nephrology does not want you to restart GDMT at this time given your kidney function (this can be reconsidered outpatient). Your bilirubin levels uptrended while you were in the hospital. Abdominal US returned showing gallstones, with gallbladder thickening and US kidney bladder returned enlarged prostate and a chronic bladder outlet obstruction. CT scan did not show any dilation of your biliary tree. Gastroenterology saw you and felt this was hepatic congestion from your CHF. They recommended surgery consult given gallstones. Today, pt is feeling better, with no respiratory issues, and hemodynamically stable. He will be discharged for outpt follow up with his PCP and cardiologist.    5/23/24: No overnight events. Pt is feeling good today and endorses good breathing. Denies fevers, chills, SOB, cp, palpitations. No other urgent acute sxs endorsed.  ROS as stated above.    Vital Signs Last 24 Hrs  T(C): 36.4 (23 May 2024 04:59), Max: 36.7 (22 May 2024 13:59)  T(F): 97.6 (23 May 2024 04:59), Max: 98.1 (22 May 2024 13:59)  HR: 75 (23 May 2024 04:59) (75 - 98)  BP: 100/55 (23 May 2024 04:59) (100/55 - 129/82)  BP(mean): 100 (22 May 2024 19:30) (74 - 100)  RR: 18 (23 May 2024 04:59) (14 - 20)  SpO2: 96% (23 May 2024 04:59) (94% - 97%)    Parameters below as of 23 May 2024 04:59  Patient On (Oxygen Delivery Method): room air    PHYSICAL EXAM  Constitutional: Pt lying in bed, awake and alert, NAD  HEENT: EOMI, normocephalic, moist mucous membranes  Neck: Soft and supple  Respiratory: CTABL, No wheezing, rales or rhonchi  Cardiovascular: S1S2+, no M/G/R  Gastrointestinal: BS+, soft, NT, no guarding, no rebound +distended (-) fluid wave  Extremities: No calf pain, 1+ symmetrical b/l LE edema,   Vascular: Peripheral pulses present, b/l LE varicosities  Neurological: AAOx3, no focal deficits  Musculoskeletal: Normal muscle tone, no atrophy, no rigidity, no contractions    IMAGING:  < from: Xray Chest 1 View- PORTABLE-Urgent (05.22.24 @ 15:09) >  Heart is enlarged.  There is a persistent dense area coming off the superior left hilum and   proceeding superolaterally into the chest.  There are some persistent small densities off the right lower hilum.  The above findings are similar to April 19 this year.  Presentlythere is a small right base effusion which is new.  IMPRESSION: Small right base effusion new since prior. Other findings     < from: US Abdomen Upper Quadrant Right (05.23.24 @ 11:25) >  IMPRESSION:  Gallstones, gallbladder mural thickening without palpable tenderness, not   significantly changed from 5/5/2024 , when allowing for differences in   modality. . Clinical correlation may determine need for hepatobiliary   scintigraphy    < from: US Kidney and Bladder (05.23.24 @ 12:53) >  IMPRESSION:  Enlarged prostate and a possible chronic bladder outlet obstruction with   43 cc of post void residual.  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 In the ED, pt was afebrile, and HDS however labs were notable for BNP 4697 with BRODY on CKD w/ BUN/Cr 56/1.95, t bili 1.9, alk phos 283. CXR showed enlarged heart,  persistent dense area coming off the superior left hilum and   proceeding superolaterally into the chest, persistent small densities of the rt lower hillum, and a small right base pleural effusion. Pt was given bumex and admitted for Acute Decompensated Heart Failure. TTE came back with EF 25-30, severe systolic dysfunction, grade 1 diastolic dysfunction, RV/RA/LA enlargement, TR/AR/[place]. Cardiology saw pt who recommended beta blockers and restart GDMT when BRODY resolves. Nephrology did not restart GDMT at this time given  kidney function (this can be reconsidered outpatient). Bilirubin levels uptrended while you were in the hospital. Abdominal US returned showing gallstones, with gallbladder thickening and US kidney bladder returned enlarged prostate and a chronic bladder outlet obstruction. CT scan did not show any dilation of your biliary tree. Gastroenterology saw patient and felt this was hepatic congestion from CHF. They recommended surgery consult given gallstones, but no surgical intervention indicated because patient asymptomatic.     Today, pt is feeling better, with no respiratory issues, and hemodynamically stable. He will be discharged for outpt follow up with his PCP, heart failure doctor and cardiologist.  5/25/24: No overnight events. Pt is feeling good today and endorses good breathing. Denies fevers, chills, SOB, cp, palpitations. No other acute sxs endorsed.  ROS as stated above.    Vital Signs Last 24 Hrs  T(C): 36.4 (25 May 2024 05:23), Max: 37.2 (24 May 2024 11:20)  T(F): 97.5 (25 May 2024 05:23), Max: 98.9 (24 May 2024 11:20)  HR: 87 (25 May 2024 05:23) (79 - 87)  BP: 105/65 (25 May 2024 05:23) (103/62 - 113/66)  BP(mean): --  RR: 18 (25 May 2024 05:23) (17 - 18)  SpO2: 96% (25 May 2024 05:23) (94% - 97%)    Parameters below as of 25 May 2024 05:23  Patient On (Oxygen Delivery Method): room air    PHYSICAL EXAM  Constitutional: Pt lying in bed, awake and alert, NAD  HEENT: EOMI, normocephalic, moist mucous membranes  Neck: Soft and supple  Respiratory: CTABL, No wheezing, rales or rhonchi  Cardiovascular: S1S2+, no M/G/R  Gastrointestinal: BS+, soft, NT, no guarding, no rebound +distended (-) fluid wave  Extremities: No calf pain, 1+ symmetrical b/l LE edema,   Vascular: Peripheral pulses present, b/l LE varicosities  Neurological: AAOx3, no focal deficits  Musculoskeletal: Normal muscle tone, no atrophy, no rigidity, no contractions    IMAGING:  < from: Xray Chest 1 View- PORTABLE-Urgent (05.22.24 @ 15:09) >  Heart is enlarged.  There is a persistent dense area coming off the superior left hilum and   proceeding superolaterally into the chest.  There are some persistent small densities off the right lower hilum.  The above findings are similar to April 19 this year.  Presentlythere is a small right base effusion which is new.  IMPRESSION: Small right base effusion new since prior. Other findings     < from: US Abdomen Upper Quadrant Right (05.23.24 @ 11:25) >  IMPRESSION:  Gallstones, gallbladder mural thickening without palpable tenderness, not   significantly changed from 5/5/2024 , when allowing for differences in   modality. . Clinical correlation may determine need for hepatobiliary   scintigraphy    < from: US Kidney and Bladder (05.23.24 @ 12:53) >  IMPRESSION:  Enlarged prostate and a possible chronic bladder outlet obstruction with   43 cc of post void residual.  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 In the ED, pt was afebrile, and HDS however labs were notable for BNP 4697 with BRODY on CKD w/ BUN/Cr 56/1.95, t bili 1.9, alk phos 283. CXR showed enlarged heart,  persistent dense area coming off the superior left hilum and   proceeding superolaterally into the chest, persistent small densities of the rt lower hillum, and a small right base pleural effusion. Pt was given bumex and admitted for Acute Decompensated Heart Failure. TTE came back with EF 25-30, severe systolic dysfunction, grade 1 diastolic dysfunction, RV/RA/LA enlargement, TR/AR/[place]. Cardiology saw pt who recommended beta blockers and restart GDMT when BRODY resolves. Nephrology did not restart GDMT at this time given  kidney function (this can be reconsidered outpatient). Bilirubin levels uptrended while you were in the hospital. Abdominal US returned showing gallstones, with gallbladder thickening and US kidney bladder returned enlarged prostate and a chronic bladder outlet obstruction. CT scan did not show any dilation of your biliary tree. Gastroenterology saw patient and felt this was hepatic congestion from CHF. They recommended surgery consult given gallstones, but no surgical intervention indicated because patient asymptomatic.   Message was left with Dr. Delroy Boston, PCP    Today, pt is feeling better, with no respiratory issues, and hemodynamically stable. He will be discharged for outpt follow up with his PCP, heart failure doctor and cardiologist.  5/25/24: No overnight events. Pt is feeling good today and endorses good breathing. Denies fevers, chills, SOB, cp, palpitations. No other acute sxs endorsed.  ROS as stated above.    Vital Signs Last 24 Hrs  T(C): 36.4 (25 May 2024 05:23), Max: 37.2 (24 May 2024 11:20)  T(F): 97.5 (25 May 2024 05:23), Max: 98.9 (24 May 2024 11:20)  HR: 87 (25 May 2024 05:23) (79 - 87)  BP: 105/65 (25 May 2024 05:23) (103/62 - 113/66)  BP(mean): --  RR: 18 (25 May 2024 05:23) (17 - 18)  SpO2: 96% (25 May 2024 05:23) (94% - 97%)    Parameters below as of 25 May 2024 05:23  Patient On (Oxygen Delivery Method): room air    PHYSICAL EXAM  Constitutional: Pt lying in bed, awake and alert, NAD  HEENT: EOMI, normocephalic, moist mucous membranes  Neck: Soft and supple  Respiratory: CTABL, No wheezing, rales or rhonchi  Cardiovascular: S1S2+, no M/G/R  Gastrointestinal: BS+, soft, NT, no guarding, no rebound +distended (-) fluid wave  Extremities: No calf pain, 1+ symmetrical b/l LE edema,   Vascular: Peripheral pulses present, b/l LE varicosities  Neurological: AAOx3, no focal deficits  Musculoskeletal: Normal muscle tone, no atrophy, no rigidity, no contractions    IMAGING:  < from: Xray Chest 1 View- PORTABLE-Urgent (05.22.24 @ 15:09) >  Heart is enlarged.  There is a persistent dense area coming off the superior left hilum and   proceeding superolaterally into the chest.  There are some persistent small densities off the right lower hilum.  The above findings are similar to April 19 this year.  Presentlythere is a small right base effusion which is new.  IMPRESSION: Small right base effusion new since prior. Other findings     < from: US Abdomen Upper Quadrant Right (05.23.24 @ 11:25) >  IMPRESSION:  Gallstones, gallbladder mural thickening without palpable tenderness, not   significantly changed from 5/5/2024 , when allowing for differences in   modality. . Clinical correlation may determine need for hepatobiliary   scintigraphy    < from: US Kidney and Bladder (05.23.24 @ 12:53) >  IMPRESSION:  Enlarged prostate and a possible chronic bladder outlet obstruction with   43 cc of post void residual.  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 In the ED, pt was afebrile, and HDS however labs were notable for BNP 4697 with BRODY on CKD w/ BUN/Cr 56/1.95, t bili 1.9, alk phos 283. CXR showed enlarged heart,  persistent dense area coming off the superior left hilum and   proceeding superolaterally into the chest, persistent small densities of the rt lower hillum, and a small right base pleural effusion. Pt was given bumex and admitted for Acute Decompensated Heart Failure. TTE came back with EF 25-30, severe systolic dysfunction, grade 1 diastolic dysfunction, RV/RA/LA enlargement, TR/AR/[place]. Cardiology saw pt who recommended beta blockers and restart GDMT when BRODY resolves. Nephrology stated that GDMT (ACE/ARB/ARNI/mineralocorticoid) was contraindicated at this time given kidney function (this can be reconsidered outpatient). Bilirubin levels uptrended while you were in the hospital. Abdominal US returned showing gallstones, with gallbladder thickening and US kidney bladder returned enlarged prostate and a chronic bladder outlet obstruction. CT scan did not show any dilation of your biliary tree. Gastroenterology saw patient and felt this was hepatic congestion from CHF. They recommended surgery consult given gallstones, but no surgical intervention indicated because patient is asymptomatic. He can follow up with surgery outpatient.      Message was left with Dr. Delroy Boston, PCP    Today, pt is feeling better, with no respiratory issues, and hemodynamically stable. He will be discharged for outpt follow up with his PCP, heart failure doctor and cardiologist.  5/25/24: No overnight events. Pt is feeling good today and endorses good breathing. Denies fevers, chills, SOB, cp, palpitations. No other acute sxs endorsed.  ROS as stated above.    Vital Signs Last 24 Hrs  T(C): 36.4 (25 May 2024 05:23), Max: 37.2 (24 May 2024 11:20)  T(F): 97.5 (25 May 2024 05:23), Max: 98.9 (24 May 2024 11:20)  HR: 87 (25 May 2024 05:23) (79 - 87)  BP: 105/65 (25 May 2024 05:23) (103/62 - 113/66)  BP(mean): --  RR: 18 (25 May 2024 05:23) (17 - 18)  SpO2: 96% (25 May 2024 05:23) (94% - 97%)    Parameters below as of 25 May 2024 05:23  Patient On (Oxygen Delivery Method): room air    PHYSICAL EXAM  Constitutional: Pt lying in bed, awake and alert, NAD  HEENT: EOMI, normocephalic, moist mucous membranes  Neck: Soft and supple  Respiratory: CTABL, No wheezing, rales or rhonchi  Cardiovascular: S1S2+, no M/G/R  Gastrointestinal: BS+, soft, NT, no guarding, no rebound +distended (-) fluid wave  Extremities: No calf pain, 1+ symmetrical b/l LE edema,   Vascular: Peripheral pulses present, b/l LE varicosities  Neurological: AAOx3, no focal deficits  Musculoskeletal: Normal muscle tone, no atrophy, no rigidity, no contractions    IMAGING:  < from: Xray Chest 1 View- PORTABLE-Urgent (05.22.24 @ 15:09) >  Heart is enlarged.  There is a persistent dense area coming off the superior left hilum and   proceeding superolaterally into the chest.  There are some persistent small densities off the right lower hilum.  The above findings are similar to April 19 this year.  Presentlythere is a small right base effusion which is new.  IMPRESSION: Small right base effusion new since prior. Other findings     < from: US Abdomen Upper Quadrant Right (05.23.24 @ 11:25) >  IMPRESSION:  Gallstones, gallbladder mural thickening without palpable tenderness, not   significantly changed from 5/5/2024 , when allowing for differences in   modality. . Clinical correlation may determine need for hepatobiliary   scintigraphy    < from: US Kidney and Bladder (05.23.24 @ 12:53) >  IMPRESSION:  Enlarged prostate and a possible chronic bladder outlet obstruction with   43 cc of post void residual.  No evidence of hydronephrosis

## 2024-05-23 NOTE — CONSULT NOTE ADULT - SUBJECTIVE AND OBJECTIVE BOX
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 No chest pain/palpitation/LH/dizziness. No N/V/D/C/F/C. Asked to eval for BRODY/CKD. Was on Bumex, Entresto, Farxiga, Jardiance, Aldactone as op.     ROS:  as above    Allergies:  No Known Allergies    PAST MEDICAL & SURGICAL HISTORY:  BPH (benign prostatic hyperplasia)  Bipolar disorder  Anxiety  Umbilical hernia, incarcerated  Depression  Constipation  Urinary retention  CAD (coronary artery disease)  SCC (squamous cell carcinoma)  Lung mass  LV (left ventricular) mural thrombus  History of inguinal hernia  History of CHF (congestive heart failure)  History of arthroscopy of knee  Right, 1987  History of tonsillectomy  1952  History of hernia repair  H/O coronary angiogram    SOCIAL HISTORY:  negative    FAMILY HISTORY:  depression (Mother)  CAD (coronary artery disease) (Sibling, Sibling)  diabetes mellitus (DM) (Sibling)    MEDICATIONS  (STANDING):  aspirin enteric coated 81 milliGRAM(s) Oral daily  atorvastatin 80 milliGRAM(s) Oral at bedtime  clopidogrel Tablet 75 milliGRAM(s) Oral daily  heparin   Injectable 5000 Unit(s) SubCutaneous every 8 hours  metoprolol succinate ER 25 milliGRAM(s) Oral daily  mirtazapine 15 milliGRAM(s) Oral at bedtime  polyethylene glycol 3350 17 Gram(s) Oral two times a day  tamsulosin 0.4 milliGRAM(s) Oral two times a day    Home Medications:  aspirin 81 mg oral delayed release tablet: 1 tab(s) orally once a day (23 May 2024 13:53)  bumetanide 1 mg oral tablet: 1 tab(s) orally once a day Takes alternating 1mg and 2mg (22 May 2024 16:52)  clopidogrel 75 mg oral tablet: 1 tab(s) orally once a day (23 May 2024 12:28)  Entresto 24 mg-26 mg oral tablet: 1 tab(s) orally once a day (23 May 2024 12:27)  Farxiga 5 mg oral tablet: 1 tab(s) orally once a day (22 May 2024 16:52)  Flomax 0.4 mg oral capsule: 1 cap(s) orally once a day Takes alternating 1 cap and 2 cap (22 May 2024 16:50)  FLUoxetine (Eqv-PROzac) 20 mg oral tablet: 1 tab(s) orally once a day (23 May 2024 12:27)  Jardiance 10 mg oral tablet: 1 tab(s) orally once a day (23 May 2024 12:26)  latanoprost 0.005% ophthalmic solution: 1 drop(s) in each eye once a day (23 May 2024 12:24)  metoprolol succinate 25 mg oral tablet, extended release: 1 tab(s) orally once a day (23 May 2024 12:25)  mirtazapine 15 mg oral tablet: 1 tab(s) orally once a day (at bedtime) (23 May 2024 13:53)  spironolactone 25 mg oral tablet: 0.5 tab(s) orally once a day (23 May 2024 12:28)    Vital Signs Last 24 Hrs  T(C): 36.7 (05-23-24 @ 12:34), Max: 36.7 (05-23-24 @ 12:34)  T(F): 98.1 (05-23-24 @ 12:34), Max: 98.1 (05-23-24 @ 12:34)  HR: 82 (05-23-24 @ 12:34) (75 - 98)  BP: 101/66 (05-23-24 @ 12:34) (100/55 - 129/82)  BP(mean): 100 (05-22-24 @ 19:30) (74 - 100)  RR: 18 (05-23-24 @ 12:34) (14 - 20)  SpO2: 96% (05-23-24 @ 12:34) (95% - 97%)    I&O's Detail    23 May 2024 07:01  -  23 May 2024 14:38  --------------------------------------------------------  IN:    Oral Fluid: 100 mL  Total IN: 100 mL    OUT:    Voided (mL): 300 mL  Total OUT: 300 mL    LABS:                        12.1   5.48  )-----------( 150      ( 23 May 2024 06:37 )             35.9     05-23    141  |  102  |  60<H>  ----------------------------<  102<H>  3.9   |  27  |  2.11<H>    Ca    9.2      23 May 2024 06:37  Phos  5.1     05-23  Mg     2.3     05-23    TPro  7.5  /  Alb  3.3  /  TBili  1.9<H>  /  DBili  0.8<H>  /  AST  46<H>  /  ALT  37  /  AlkPhos  316<H>  05-23    LIVER FUNCTIONS - ( 23 May 2024 06:37 )  Alb: 3.3 g/dL / Pro: 7.5 g/dL / ALK PHOS: 316 U/L / ALT: 37 U/L / AST: 46 U/L / GGT: x           A/P:    full consult to follow    [phone]         NEPHROLOGY CONSULTATION    CHIEF COMPLAINT: SOB    HPI:  Pt is 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 No chest pain/palpitation/dizziness. No N/V/D/C/F/C. Asked to eval for BRODY/CKD.     ROS:  as above    Allergies:  No Known Allergies    PAST MEDICAL & SURGICAL HISTORY:  BPH (benign prostatic hyperplasia)  Bipolar disorder  Anxiety  Umbilical hernia, incarcerated  Depression  Constipation  Urinary retention  CAD (coronary artery disease)  SCC (squamous cell carcinoma)  Lung mass  LV (left ventricular) mural thrombus  History of inguinal hernia  History of CHF (congestive heart failure)  History of arthroscopy of knee  Right, 1987  History of tonsillectomy  1952  History of hernia repair  H/O coronary angiogram    SOCIAL HISTORY:  negative    FAMILY HISTORY:  depression (Mother)  CAD (coronary artery disease) (Sibling, Sibling)  diabetes mellitus (DM) (Sibling)    MEDICATIONS  (STANDING):  aspirin enteric coated 81 milliGRAM(s) Oral daily  atorvastatin 80 milliGRAM(s) Oral at bedtime  clopidogrel Tablet 75 milliGRAM(s) Oral daily  heparin   Injectable 5000 Unit(s) SubCutaneous every 8 hours  metoprolol succinate ER 25 milliGRAM(s) Oral daily  mirtazapine 15 milliGRAM(s) Oral at bedtime  polyethylene glycol 3350 17 Gram(s) Oral two times a day  tamsulosin 0.4 milliGRAM(s) Oral two times a day    Home Medications:  aspirin 81 mg oral delayed release tablet: 1 tab(s) orally once a day (23 May 2024 13:53)  bumetanide 1 mg oral tablet: 1 tab(s) orally once a day Takes alternating 1mg and 2mg (22 May 2024 16:52)  clopidogrel 75 mg oral tablet: 1 tab(s) orally once a day (23 May 2024 12:28)  Entresto 24 mg-26 mg oral tablet: 1 tab(s) orally once a day (23 May 2024 12:27)  Farxiga 5 mg oral tablet: 1 tab(s) orally once a day (22 May 2024 16:52)  Flomax 0.4 mg oral capsule: 1 cap(s) orally once a day Takes alternating 1 cap and 2 cap (22 May 2024 16:50)  FLUoxetine (Eqv-PROzac) 20 mg oral tablet: 1 tab(s) orally once a day (23 May 2024 12:27)  Jardiance 10 mg oral tablet: 1 tab(s) orally once a day (23 May 2024 12:26)  latanoprost 0.005% ophthalmic solution: 1 drop(s) in each eye once a day (23 May 2024 12:24)  metoprolol succinate 25 mg oral tablet, extended release: 1 tab(s) orally once a day (23 May 2024 12:25)  mirtazapine 15 mg oral tablet: 1 tab(s) orally once a day (at bedtime) (23 May 2024 13:53)  spironolactone 25 mg oral tablet: 0.5 tab(s) orally once a day (23 May 2024 12:28)    Vital Signs Last 24 Hrs  T(C): 36.7 (05-23-24 @ 12:34), Max: 36.7 (05-23-24 @ 12:34)  T(F): 98.1 (05-23-24 @ 12:34), Max: 98.1 (05-23-24 @ 12:34)  HR: 82 (05-23-24 @ 12:34) (75 - 98)  BP: 101/66 (05-23-24 @ 12:34) (100/55 - 129/82)  BP(mean): 100 (05-22-24 @ 19:30) (74 - 100)  RR: 18 (05-23-24 @ 12:34) (14 - 20)  SpO2: 96% (05-23-24 @ 12:34) (95% - 97%)    I&O's Detail    23 May 2024 07:01  -  23 May 2024 14:38  --------------------------------------------------------  IN:    Oral Fluid: 100 mL  Total IN: 100 mL    OUT:    Voided (mL): 300 mL  Total OUT: 300 mL    s1s2  b/l air entry  soft, ND  tr edema      LABS:                        12.1   5.48  )-----------( 150      ( 23 May 2024 06:37 )             35.9     05-23    141  |  102  |  60<H>  ----------------------------<  102<H>  3.9   |  27  |  2.11<H>    Ca    9.2      23 May 2024 06:37  Phos  5.1     05-23  Mg     2.3     05-23    TPro  7.5  /  Alb  3.3  /  TBili  1.9<H>  /  DBili  0.8<H>  /  AST  46<H>  /  ALT  37  /  AlkPhos  316<H>  05-23    LIVER FUNCTIONS - ( 23 May 2024 06:37 )  Alb: 3.3 g/dL / Pro: 7.5 g/dL / ALK PHOS: 316 U/L / ALT: 37 U/L / AST: 46 U/L / GGT: x           A/P:    CM, EF 25 - 30%, lung ca   Cardio-renal BRODY/CKD 3  SONO w/o acute findings   No objection to diuresis as needed  Will f/u UA, BMP  Avoid nephrotoxins as able   May have to accept higher Cr to be able to optimize volume status  Consider Pulm eval    [phone]

## 2024-05-23 NOTE — PROGRESS NOTE ADULT - ASSESSMENT
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    #Acute Decompensated Heart Failure  #History of Combined Systolic and Diastolic HF with EF 25-30% (Echo 4/2024)  #History of CAD/Valvular Disease (Mild-Mod MR, Moderate TR)  Clinically Hypervolemic  Trop neg x 2, BNP 4697, CXR finding of ?increase vascular congestion  TTE 4/2024: EF 25-30%, grade1 diastolic dysfunction, Mild-moderate MR, Moderate TR  -cardiology consult  -Daily weight. Strict in's and out's, telemetry  -Continue aspirin, plavix, atorvastatin for crestor while inpatient  -Hold spironlactone and farxiga in setting of BRODY on CKD for now, resume as appropriate    #BRODY on CKD stage III  Baseline Cr ~1.3-1.5 range  In setting of diuresis/volume overload  -Cardiology Consult  -Avoid nephrotoxic agents  -Monitor BMP  -Nephrology consult, left message for Dr. Romeo    #Anxiety/Depression/Bipolar Disorder  -Continue prozac, mirtazapine    #BPH  -Continue flomax (takes alternating 0.4mg and 0.8mg)    #History of Lung Cancer  #HARRY Lung Nodule  -s/p RUL lobectomy (9/2022) with recurrent disease s/p radiation to left lung (completed 3/2023)  -Recent PET showed The LEFT lung nodule does appear increasingly rounded solid, and although the degree of uptake there is essentially unchanged. The RIGHT lung appears to have slowly become denser over time, with the appearance of uptake there   are also appearing slightly more prominent.   -Follows with Dr. Alejandre for surveillance    #Elevated T bili/Alk Phos  -He has no abdominal pain, possibly due to hepatic congestion  -Monitor CMP, further workup pending trend    #DVT PPx  -Heparin SC    #FULL CODE    Discussed with Dr. 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    #Acute Decompensated Heart Failure  #History of Combined Systolic and Diastolic HF with EF 25-30% (Echo 4/2024)  #History of CAD/Valvular Disease (Mild-Mod MR, Moderate TR)  Clinically Euvolemic  TTE 4/2024: EF 25-30%, grade1 diastolic dysfunction, Mild-moderate MR, Moderate TR  -cardiology consult  -Daily weight. Strict in's and out's, telemetry  -stop IV bumex, 1mg PO this PM then 2mg PO daily  -Continue aspirin, plavix  -will add coreg pending cardiology rec  -Hold spironolactone and farxiga in setting of BRODY on CKD for now, resume as appropriate    #BRODY on CKD stage III 2/2 possible cardio-renal syndrome  Baseline Cr ~1.3-1.5 range  Cr: 1.95>2.11  In setting of diuresis/volume overload  -Nephrology Consult  -Avoid nephrotoxic agents  -Monitor BMP    #Elevated T bili/Alk Phos/mild transaminitis  -He has no abdominal pain, possibly due to hepatic congestion  -Monitor CMP  -hold statin  -f/u direct bili  -f/u US RUQ    #Anxiety/Depression/Bipolar Disorder  -Continue prozac, mirtazapine    #BPH  -Continue flomax (takes alternating 0.4mg and 0.8mg)    #History of Lung Cancer  #HARRY Lung Nodule  -s/p RUL lobectomy (9/2022) with recurrent disease s/p radiation to left lung (completed 3/2023)  -Recent PET showed The LEFT lung nodule does appear increasingly rounded solid, and although the degree of uptake there is essentially unchanged. The RIGHT lung appears to have slowly become denser over time, with the appearance of uptake there   are also appearing slightly more prominent.   -Follows with Dr. Alejandre for surveillance    #DVT PPx  -Heparin SC    #FULL CODE    Discussed with Dr. 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    #Acute Decompensated Heart Failure  #History of Combined Systolic and Diastolic HF with EF 25-30% (Echo 4/2024)  #History of CAD/Valvular Disease (Mild-Mod MR, Moderate TR)  Clinically Euvolemic  TTE 4/2024: EF 25-30%, grade1 diastolic dysfunction, Mild-moderate MR, Moderate TR  -cardiology consult  -Daily weight. Strict in's and out's, telemetry  -stop IV bumex, 1mg PO this PM then 2mg PO daily  -Continue aspirin, plavix  -will add coreg pending cardiology rec  -Hold spironolactone and farxiga in setting of BRODY on CKD for now, resume as appropriate    #BRODY on CKD stage III 2/2 possible cardio-renal syndrome vs. over-diuresis   Baseline Cr ~1.3-1.5 range  Cr: 1.95>2.11  In setting of diuresis/volume overload  -Nephrology Consult  -Avoid nephrotoxic agents  -Monitor BMP    #Elevated T bili/Alk Phos/mild transaminitis  -He has no abdominal pain, possibly due to hepatic congestion  -Monitor CMP  -hold statin  -f/u direct bili  -f/u US RUQ    #Anxiety/Depression/Bipolar Disorder  -Continue prozac, mirtazapine    #BPH  -Continue flomax (takes alternating 0.4mg and 0.8mg)    #History of Lung Cancer  #HARRY Lung Nodule  -s/p RUL lobectomy (9/2022) with recurrent disease s/p radiation to left lung (completed 3/2023)  -Recent PET showed The LEFT lung nodule does appear increasingly rounded solid, and although the degree of uptake there is essentially unchanged. The RIGHT lung appears to have slowly become denser over time, with the appearance of uptake there   are also appearing slightly more prominent.   -Follows with Dr. Alejandre for surveillance    #DVT PPx  -Heparin SC    #FULL CODE    Med-rec re-done: patient not taking many of his medications as prescribed. Re-ordered medications as prescribed.     Discussed with Dr. Sheehan

## 2024-05-24 ENCOUNTER — TRANSCRIPTION ENCOUNTER (OUTPATIENT)
Age: 79
End: 2024-05-24

## 2024-05-24 DIAGNOSIS — R79.89 OTHER SPECIFIED ABNORMAL FINDINGS OF BLOOD CHEMISTRY: ICD-10-CM

## 2024-05-24 PROBLEM — R91.1 LUNG NODULE: Status: ACTIVE | Noted: 2022-07-05

## 2024-05-24 PROBLEM — C34.91 ADENOCARCINOMA, LUNG, RIGHT: Status: ACTIVE | Noted: 2022-09-19

## 2024-05-24 LAB
ALBUMIN SERPL ELPH-MCNC: 3 G/DL — LOW (ref 3.3–5)
ALP SERPL-CCNC: 294 U/L — HIGH (ref 40–120)
ALT FLD-CCNC: 35 U/L — SIGNIFICANT CHANGE UP (ref 10–45)
ANION GAP SERPL CALC-SCNC: 11 MMOL/L — SIGNIFICANT CHANGE UP (ref 5–17)
AST SERPL-CCNC: 44 U/L — HIGH (ref 10–40)
BILIRUB SERPL-MCNC: 2 MG/DL — HIGH (ref 0.2–1.2)
BUN SERPL-MCNC: 64 MG/DL — HIGH (ref 7–23)
CALCIUM SERPL-MCNC: 8.9 MG/DL — SIGNIFICANT CHANGE UP (ref 8.4–10.5)
CERULOPLASMIN SERPL-MCNC: 43 MG/DL — HIGH (ref 15–30)
CHLORIDE SERPL-SCNC: 102 MMOL/L — SIGNIFICANT CHANGE UP (ref 96–108)
CO2 SERPL-SCNC: 24 MMOL/L — SIGNIFICANT CHANGE UP (ref 22–31)
CREAT SERPL-MCNC: 2.1 MG/DL — HIGH (ref 0.5–1.3)
EGFR: 31 ML/MIN/1.73M2 — LOW
GGT SERPL-CCNC: 422 U/L — HIGH (ref 9–50)
GLUCOSE SERPL-MCNC: 104 MG/DL — HIGH (ref 70–99)
HCT VFR BLD CALC: 34.1 % — LOW (ref 39–50)
HGB BLD-MCNC: 11.3 G/DL — LOW (ref 13–17)
MCHC RBC-ENTMCNC: 31.5 PG — SIGNIFICANT CHANGE UP (ref 27–34)
MCHC RBC-ENTMCNC: 33.1 GM/DL — SIGNIFICANT CHANGE UP (ref 32–36)
MCV RBC AUTO: 95 FL — SIGNIFICANT CHANGE UP (ref 80–100)
NRBC # BLD: 0 /100 WBCS — SIGNIFICANT CHANGE UP (ref 0–0)
PLATELET # BLD AUTO: 136 K/UL — LOW (ref 150–400)
POTASSIUM SERPL-MCNC: 3.7 MMOL/L — SIGNIFICANT CHANGE UP (ref 3.5–5.3)
POTASSIUM SERPL-SCNC: 3.7 MMOL/L — SIGNIFICANT CHANGE UP (ref 3.5–5.3)
PROT SERPL-MCNC: 7 G/DL — SIGNIFICANT CHANGE UP (ref 6–8.3)
RBC # BLD: 3.59 M/UL — LOW (ref 4.2–5.8)
RBC # FLD: 17.2 % — HIGH (ref 10.3–14.5)
SODIUM SERPL-SCNC: 137 MMOL/L — SIGNIFICANT CHANGE UP (ref 135–145)
TRANSFERRIN SERPL-MCNC: 338 MG/DL — SIGNIFICANT CHANGE UP (ref 200–360)
WBC # BLD: 5.45 K/UL — SIGNIFICANT CHANGE UP (ref 3.8–10.5)
WBC # FLD AUTO: 5.45 K/UL — SIGNIFICANT CHANGE UP (ref 3.8–10.5)

## 2024-05-24 PROCEDURE — 99232 SBSQ HOSP IP/OBS MODERATE 35: CPT

## 2024-05-24 PROCEDURE — 74176 CT ABD & PELVIS W/O CONTRAST: CPT | Mod: 26

## 2024-05-24 PROCEDURE — 99223 1ST HOSP IP/OBS HIGH 75: CPT | Mod: FS

## 2024-05-24 PROCEDURE — 99221 1ST HOSP IP/OBS SF/LOW 40: CPT

## 2024-05-24 PROCEDURE — 99233 SBSQ HOSP IP/OBS HIGH 50: CPT | Mod: GC

## 2024-05-24 RX ORDER — TEMAZEPAM 15 MG/1
7.5 CAPSULE ORAL AT BEDTIME
Refills: 0 | Status: DISCONTINUED | OUTPATIENT
Start: 2024-05-24 | End: 2024-05-25

## 2024-05-24 RX ORDER — TAMSULOSIN HYDROCHLORIDE 0.4 MG/1
0.8 CAPSULE ORAL AT BEDTIME
Refills: 0 | Status: DISCONTINUED | OUTPATIENT
Start: 2024-05-24 | End: 2024-05-25

## 2024-05-24 RX ORDER — BUMETANIDE 0.25 MG/ML
2 INJECTION INTRAMUSCULAR; INTRAVENOUS DAILY
Refills: 0 | Status: DISCONTINUED | OUTPATIENT
Start: 2024-05-24 | End: 2024-05-25

## 2024-05-24 RX ADMIN — MIRTAZAPINE 15 MILLIGRAM(S): 45 TABLET, ORALLY DISINTEGRATING ORAL at 21:19

## 2024-05-24 RX ADMIN — BUMETANIDE 2 MILLIGRAM(S): 0.25 INJECTION INTRAMUSCULAR; INTRAVENOUS at 11:47

## 2024-05-24 RX ADMIN — HEPARIN SODIUM 5000 UNIT(S): 5000 INJECTION INTRAVENOUS; SUBCUTANEOUS at 21:20

## 2024-05-24 RX ADMIN — HEPARIN SODIUM 5000 UNIT(S): 5000 INJECTION INTRAVENOUS; SUBCUTANEOUS at 05:17

## 2024-05-24 RX ADMIN — LATANOPROST 1 DROP(S): 0.05 SOLUTION/ DROPS OPHTHALMIC; TOPICAL at 21:20

## 2024-05-24 RX ADMIN — TAMSULOSIN HYDROCHLORIDE 0.8 MILLIGRAM(S): 0.4 CAPSULE ORAL at 21:18

## 2024-05-24 RX ADMIN — HEPARIN SODIUM 5000 UNIT(S): 5000 INJECTION INTRAVENOUS; SUBCUTANEOUS at 13:20

## 2024-05-24 RX ADMIN — CLOPIDOGREL BISULFATE 75 MILLIGRAM(S): 75 TABLET, FILM COATED ORAL at 13:20

## 2024-05-24 RX ADMIN — Medication 81 MILLIGRAM(S): at 13:19

## 2024-05-24 RX ADMIN — ATORVASTATIN CALCIUM 80 MILLIGRAM(S): 80 TABLET, FILM COATED ORAL at 21:17

## 2024-05-24 NOTE — DISCHARGE NOTE NURSING/CASE MANAGEMENT/SOCIAL WORK - NSDCPEFALRISK_GEN_ALL_CORE
For information on Fall & Injury Prevention, visit: [email or web address] OR  [email or web address] OR  https://www.cdc.gov/steadi/patient.html

## 2024-05-24 NOTE — PROGRESS NOTE ADULT - ASSESSMENT
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    Reccomendations:  TTE with EF 25-30%, severely decr LV function and mod TR  XR with small right base effusion  pt s/p iv bumex, transitioned to PO  Continue BB as part of GDMT, further GDMT on hold 2/2 acute on chronic kidney disease.  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    Reccomendations:  TTE with EF 25-30%, severely decr LV function and mod TR  XR with small right base effusion  pt s/p iv bumex, transitioned to PO  Continue BB as part of GDMT, further GDMT on hold 2/2 acute on chronic kidney disease  transaminitis noted. ct ap pending as per primary team

## 2024-05-24 NOTE — CHART NOTE - NSCHARTNOTEFT_GEN_A_CORE
Contacted by RN. Pt requesting temazepan for sleep, states its home medication. Obtains Rx from [facility] at [place] (closed at time of request).  Contacted wife who confirmed patient takes temazepan 15mg, prescribed by Dr Justo Yu ([phone]). States she will bring medication in am

## 2024-05-24 NOTE — PROGRESS NOTE ADULT - SUBJECTIVE AND OBJECTIVE BOX
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    Overnight Events: None  Interval History: Patient was seen and examined by me this morning at bedside. Says he feels significantly better this AM    REVIEW OF SYSTEMS:  CONSTITUTIONAL: No weakness, fevers or chills  EYES/ENT: No visual changes;  No vertigo or throat pain   NECK: No pain or stiffness  RESPIRATORY: No cough, wheezing, hemoptysis; No shortness of breath  CARDIOVASCULAR: No chest pain or palpitations  GASTROINTESTINAL: No abdominal or epigastric pain. No nausea, vomiting; No diarrhea or constipation.   GENITOURINARY: No dysuria, frequency or hematuria  NEUROLOGICAL: No numbness or weakness  SKIN: No itching, burning, rashes, or lesions       Vital Signs Last 24 Hrs  T(C): 36.4 (23 May 2024 04:59), Max: 36.7 (22 May 2024 13:59)  T(F): 97.6 (23 May 2024 04:59), Max: 98.1 (22 May 2024 13:59)  HR: 75 (23 May 2024 04:59) (75 - 98)  BP: 100/55 (23 May 2024 04:59) (100/55 - 129/82)  BP(mean): 100 (22 May 2024 19:30) (74 - 100)  RR: 18 (23 May 2024 04:59) (14 - 20)  SpO2: 96% (23 May 2024 04:59) (94% - 97%)    Parameters below as of 23 May 2024 04:59  Patient On (Oxygen Delivery Method): room air    PHYSICAL EXAM  Constitutional: Pt lying in bed, awake and alert, NAD  HEENT: EOMI, normocephalic, moist mucous membranes  Neck: Soft and supple  Respiratory: CTABL, No wheezing, rales or rhonchi  Cardiovascular: S1S2+, no M/G/R  Gastrointestinal: BS+, soft, NT, no guarding, no rebound +distended (-) fluid wave  Extremities: No calf pain, 1+ symmetrical b/l LE edema,   Vascular: Peripheral pulses present, b/l LE varicosities  Neurological: AAOx3, no focal deficits  Musculoskeletal: Normal muscle tone, no atrophy, no rigidity, no contractions    MEDICATIONS:  MEDICATIONS  (STANDING):  aspirin enteric coated 81 milliGRAM(s) Oral daily  atorvastatin 80 milliGRAM(s) Oral at bedtime  buMETAnide Injectable 1 milliGRAM(s) IV Push two times a day  clopidogrel Tablet 75 milliGRAM(s) Oral daily  FLUoxetine 20 milliGRAM(s) Oral <User Schedule>  heparin   Injectable 5000 Unit(s) SubCutaneous every 8 hours  mirtazapine 15 milliGRAM(s) Oral at bedtime  tamsulosin 0.8 milliGRAM(s) Oral <User Schedule>  tamsulosin 0.4 milliGRAM(s) Oral <User Schedule>    MEDICATIONS  (PRN):  acetaminophen     Tablet .. 650 milliGRAM(s) Oral every 6 hours PRN Temp greater or equal to 38C (100.4F), Mild Pain (1 - 3)  aluminum hydroxide/magnesium hydroxide/simethicone Suspension 30 milliLiter(s) Oral every 4 hours PRN Dyspepsia  melatonin 3 milliGRAM(s) Oral at bedtime PRN Insomnia  ondansetron Injectable 4 milliGRAM(s) IV Push every 8 hours PRN Nausea and/or Vomiting      LABS: All Labs Reviewed:              12.1                 141  | 27   | 60           5.48  >-----------< 150     ------------------------< 102                   35.9                 3.9  | 102  | 2.11                                         Ca 9.2   Mg x     Ph x      Urinalysis Basic - ( 23 May 2024 06:37 )    Color: x / Appearance: x / SG: x / pH: x  Gluc: 102 mg/dL / Ketone: x  / Bili: x / Urobili: x   Blood: x / Protein: x / Nitrite: x   Leuk Esterase: x / RBC: x / WBC x   Sq Epi: x / Non Sq Epi: x / Bacteria: x    RADIOLOGY/EKG (personally reviewed):  < from: 12 Lead ECG (05.22.24 @ 14:25) >  Diagnosis Line Sinus rhythm [id number] degree AV block with frequent premature ventricular complexes  Possible Left atrial enlargement  Left axis deviation  Anteroseptal infarct (cited on or before 11-MAY-2022)  Abnormal ECG  When compared with ECG of 19-APR-2024 14:39,  Premature ventricular complexes are now present  Confirmed by Luis Carlos Gilliam ([id number]) on 5/22/2024 3:52:42 PM    < end of copied text >  < from: Xray Chest 1 View- PORTABLE-Urgent (05.22.24 @ 15:09) >  IMPRESSION: Small right base effusion new since prior. Other findings   stable.    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    Overnight Events: None  Interval History: Patient was seen and examined by me this morning at bedside. Says he feels significantly better this AM    REVIEW OF SYSTEMS:  CONSTITUTIONAL: No weakness, fevers or chills  EYES/ENT: No visual changes;  No vertigo or throat pain   NECK: No pain or stiffness  RESPIRATORY: No cough, wheezing, hemoptysis; No shortness of breath  CARDIOVASCULAR: No chest pain or palpitations  GASTROINTESTINAL: No abdominal or epigastric pain. No nausea, vomiting; No diarrhea or constipation.   GENITOURINARY: No dysuria, frequency or hematuria  NEUROLOGICAL: No numbness or weakness  SKIN: No itching, burning, rashes, or lesions     Vital Signs Last 24 Hrs  T(C): 36.3 (24 May 2024 05:00), Max: 36.7 (23 May 2024 12:34)  T(F): 97.4 (24 May 2024 05:00), Max: 98.1 (23 May 2024 12:34)  HR: 81 (24 May 2024 05:00) (79 - 82)  BP: 103/67 (24 May 2024 05:00) (101/66 - 114/78)  BP(mean): --  RR: 17 (24 May 2024 05:00) (17 - 18)  SpO2: 95% (24 May 2024 05:00) (95% - 96%)    Parameters below as of 24 May 2024 05:00  Patient On (Oxygen Delivery Method): room air    I&O's Summary    23 May 2024 07:01  -  24 May 2024 07:00  --------------------------------------------------------  IN: 1148 mL / OUT: 1200 mL / NET: -52 mL      PHYSICAL EXAM  Constitutional: Pt lying in bed, awake and alert, NAD  HEENT: EOMI, normocephalic, moist mucous membranes  Neck: Soft and supple  Respiratory: CTABL, No wheezing, rales or rhonchi  Cardiovascular: S1S2+, no M/G/R  Gastrointestinal: BS+, soft, NT, no guarding, no rebound +distended (-) fluid wave  Extremities: No calf pain, 1+ symmetrical b/l LE edema,   Vascular: Peripheral pulses present, b/l LE varicosities  Neurological: AAOx3, no focal deficits  Musculoskeletal: Normal muscle tone, no atrophy, no rigidity, no contractions    MEDICATIONS  (STANDING):  aspirin enteric coated 81 milliGRAM(s) Oral daily  atorvastatin 80 milliGRAM(s) Oral at bedtime  clopidogrel Tablet 75 milliGRAM(s) Oral daily  heparin   Injectable 5000 Unit(s) SubCutaneous every 8 hours  latanoprost 0.005% Ophthalmic Solution 1 Drop(s) Both EYES at bedtime  metoprolol succinate ER 25 milliGRAM(s) Oral daily  mirtazapine 15 milliGRAM(s) Oral at bedtime  polyethylene glycol 3350 17 Gram(s) Oral two times a day  tamsulosin 0.8 milliGRAM(s) Oral <User Schedule>  tamsulosin 0.4 milliGRAM(s) Oral <User Schedule>    MEDICATIONS  (PRN):  acetaminophen     Tablet .. 650 milliGRAM(s) Oral every 6 hours PRN Temp greater or equal to 38C (100.4F), Mild Pain (1 - 3)  aluminum hydroxide/magnesium hydroxide/simethicone Suspension 30 milliLiter(s) Oral every 4 hours PRN Dyspepsia  melatonin 3 milliGRAM(s) Oral at bedtime PRN Insomnia  ondansetron Injectable 4 milliGRAM(s) IV Push every 8 hours PRN Nausea and/or Vomiting      LABS: All Labs Reviewed:              12.1                 141  | 27   | 60           5.48  >-----------< 150     ------------------------< 102                   35.9                 3.9  | 102  | 2.11                                         Ca 9.2   Mg x     Ph x      Urinalysis Basic - ( 23 May 2024 06:37 )    Color: x / Appearance: x / SG: x / pH: x  Gluc: 102 mg/dL / Ketone: x  / Bili: x / Urobili: x   Blood: x / Protein: x / Nitrite: x   Leuk Esterase: x / RBC: x / WBC x   Sq Epi: x / Non Sq Epi: x / Bacteria: x    RADIOLOGY/EKG (personally reviewed):  < from: 12 Lead ECG (05.22.24 @ 14:25) >  Diagnosis Line Sinus rhythm [id number] degree AV block with frequent premature ventricular complexes  Possible Left atrial enlargement  Left axis deviation  Anteroseptal infarct (cited on or before 11-MAY-2022)  Abnormal ECG  When compared with ECG of 19-APR-2024 14:39,  Premature ventricular complexes are now present  Confirmed by Luis Carlos Gilliam ([id number]) on 5/22/2024 3:52:42 PM    < end of copied text >  < from: Xray Chest 1 View- PORTABLE-Urgent (05.22.24 @ 15:09) >  IMPRESSION: Small right base effusion new since prior. Other findings   stable.    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    Overnight Events: None  Interval History: Patient was seen and examined by me this morning at bedside. Says he feels significantly better this AM    REVIEW OF SYSTEMS:  CONSTITUTIONAL: No weakness, fevers or chills  EYES/ENT: No visual changes;  No vertigo or throat pain   NECK: No pain or stiffness  RESPIRATORY: No cough, wheezing, hemoptysis; No shortness of breath  CARDIOVASCULAR: No chest pain or palpitations  GASTROINTESTINAL: No abdominal or epigastric pain. No nausea, vomiting; No diarrhea or constipation.   GENITOURINARY: No dysuria, frequency or hematuria  NEUROLOGICAL: No numbness or weakness  SKIN: No itching, burning, rashes, or lesions     Vital Signs Last 24 Hrs  T(C): 36.3 (24 May 2024 05:00), Max: 36.7 (23 May 2024 12:34)  T(F): 97.4 (24 May 2024 05:00), Max: 98.1 (23 May 2024 12:34)  HR: 81 (24 May 2024 05:00) (79 - 82)  BP: 103/67 (24 May 2024 05:00) (101/66 - 114/78)  BP(mean): --  RR: 17 (24 May 2024 05:00) (17 - 18)  SpO2: 95% (24 May 2024 05:00) (95% - 96%)    Parameters below as of 24 May 2024 05:00  Patient On (Oxygen Delivery Method): room air    I&O's Summary    23 May 2024 07:01  -  24 May 2024 07:00  --------------------------------------------------------  IN: 1148 mL / OUT: 1200 mL / NET: -52 mL      PHYSICAL EXAM  Constitutional: Pt lying in bed, awake and alert, NAD, overall appears mildly jaundiced  HEENT: EOMI, normocephalic, moist mucous membranes  Neck: Soft and supple  Respiratory: CTABL, No wheezing, rales or rhonchi  Cardiovascular: S1S2+, no M/G/R  Gastrointestinal: BS+, soft, NT, no guarding, no rebound +distended (-) fluid wave  Extremities: No calf pain, 1+ symmetrical b/l LE edema, bilateral varicosities  Vascular: Peripheral pulses present, b/l LE varicosities  Neurological: AAOx3, no focal deficits  Musculoskeletal: Normal muscle tone, no atrophy, no rigidity, no contractions    MEDICATIONS  (STANDING):  aspirin enteric coated 81 milliGRAM(s) Oral daily  atorvastatin 80 milliGRAM(s) Oral at bedtime  clopidogrel Tablet 75 milliGRAM(s) Oral daily  heparin   Injectable 5000 Unit(s) SubCutaneous every 8 hours  latanoprost 0.005% Ophthalmic Solution 1 Drop(s) Both EYES at bedtime  metoprolol succinate ER 25 milliGRAM(s) Oral daily  mirtazapine 15 milliGRAM(s) Oral at bedtime  polyethylene glycol 3350 17 Gram(s) Oral two times a day  tamsulosin 0.8 milliGRAM(s) Oral <User Schedule>  tamsulosin 0.4 milliGRAM(s) Oral <User Schedule>    MEDICATIONS  (PRN):  acetaminophen     Tablet .. 650 milliGRAM(s) Oral every 6 hours PRN Temp greater or equal to 38C (100.4F), Mild Pain (1 - 3)  aluminum hydroxide/magnesium hydroxide/simethicone Suspension 30 milliLiter(s) Oral every 4 hours PRN Dyspepsia  melatonin 3 milliGRAM(s) Oral at bedtime PRN Insomnia  ondansetron Injectable 4 milliGRAM(s) IV Push every 8 hours PRN Nausea and/or Vomiting      LABS: All Labs Reviewed:              12.1                 141  | 27   | 60           5.48  >-----------< 150     ------------------------< 102                   35.9                 3.9  | 102  | 2.11                                         Ca 9.2   Mg x     Ph x      Urinalysis Basic - ( 23 May 2024 06:37 )    Color: x / Appearance: x / SG: x / pH: x  Gluc: 102 mg/dL / Ketone: x  / Bili: x / Urobili: x   Blood: x / Protein: x / Nitrite: x   Leuk Esterase: x / RBC: x / WBC x   Sq Epi: x / Non Sq Epi: x / Bacteria: x    RADIOLOGY/EKG (personally reviewed):  < from: 12 Lead ECG (05.22.24 @ 14:25) >  Diagnosis Line Sinus rhythm [id number] degree AV block with frequent premature ventricular complexes  Possible Left atrial enlargement  Left axis deviation  Anteroseptal infarct (cited on or before 11-MAY-2022)  Abnormal ECG  When compared with ECG of 19-APR-2024 14:39,  Premature ventricular complexes are now present  Confirmed by Luis Carlos Gilliam ([id number]) on 5/22/2024 3:52:42 PM    < end of copied text >  < from: Xray Chest 1 View- PORTABLE-Urgent (05.22.24 @ 15:09) >  IMPRESSION: Small right base effusion new since prior. Other findings   stable.    < end of copied text >

## 2024-05-24 NOTE — PROGRESS NOTE ADULT - SUBJECTIVE AND OBJECTIVE BOX
ELYSE MALAVE  [id number]    Chief Complaint: HFrEF  Interval events: pt seen and examined, labs and chart reviewed. denies sob or cp. sinus 80s on tele    ALLERGIES:  No Known Allergies      PAST MEDICAL & SURGICAL HISTORY:  BPH (benign prostatic hyperplasia)      Bipolar disorder      Depression      Anxiety      Umbilical hernia, incarcerated      Depression      Constipation      Urinary retention      CAD (coronary artery disease)      SCC (squamous cell carcinoma)      Lung mass      Other nonspecific abnormal finding of lung field      LV (left ventricular) mural thrombus      History of inguinal hernia      History of CHF (congestive heart failure)      History of arthroscopy of knee  Right, 1987      History of tonsillectomy  1952      History of hernia repair      H/O coronary angiogram            CURRENT MEDICATIONS:    MEDICATIONS  (STANDING):  aspirin enteric coated 81 milliGRAM(s) Oral daily  atorvastatin 80 milliGRAM(s) Oral at bedtime  buMETAnide 2 milliGRAM(s) Oral daily  clopidogrel Tablet 75 milliGRAM(s) Oral daily  heparin   Injectable 5000 Unit(s) SubCutaneous every 8 hours  latanoprost 0.005% Ophthalmic Solution 1 Drop(s) Both EYES at bedtime  metoprolol succinate ER 25 milliGRAM(s) Oral daily  mirtazapine 15 milliGRAM(s) Oral at bedtime  polyethylene glycol 3350 17 Gram(s) Oral two times a day  tamsulosin 0.4 milliGRAM(s) Oral <User Schedule>  tamsulosin 0.8 milliGRAM(s) Oral <User Schedule>    MEDICATIONS  (PRN):  acetaminophen     Tablet .. 650 milliGRAM(s) Oral every 6 hours PRN Temp greater or equal to 38C (100.4F), Mild Pain (1 - 3)  aluminum hydroxide/magnesium hydroxide/simethicone Suspension 30 milliLiter(s) Oral every 4 hours PRN Dyspepsia  melatonin 3 milliGRAM(s) Oral at bedtime PRN Insomnia  ondansetron Injectable 4 milliGRAM(s) IV Push every 8 hours PRN Nausea and/or Vomiting      SOCIAL HISTORY:  denies    FAMILY HISTORY:  Family history of depression (Mother)    FH: CAD (coronary artery disease) (Sibling, Sibling)    Family history of diabetes mellitus (DM) (Sibling)        ROS:  All 10 systems reviewed and positives noted in HPI    OBJECTIVE:    VITAL SIGNS:  Vital Signs Last 24 Hrs  T(C): 36.3 (24 May 2024 05:00), Max: 36.7 (23 May 2024 12:34)  T(F): 97.4 (24 May 2024 05:00), Max: 98.1 (23 May 2024 12:34)  HR: 81 (24 May 2024 05:00) (79 - 82)  BP: 103/67 (24 May 2024 05:00) (101/66 - 114/78)  BP(mean): --  RR: 17 (24 May 2024 05:00) (17 - 18)  SpO2: 95% (24 May 2024 05:00) (95% - 96%)    Parameters below as of 24 May 2024 05:00  Patient On (Oxygen Delivery Method): room air        PHYSICAL EXAM:  General: no distress  HEENT: sclera anicteric  Neck: supple, no carotid bruits b/l  CVS: JVP ~ 7 cm H20, RRR, s1, s2, no murmurs/rubs/gallops  Chest: unlabored respirations, clear to auscultation b/l  Abdomen: non-distended  Extremities: no lower extremity edema b/l  Neuro: awake, alert & oriented x 3      LABS:                        11.3   5.45  )-----------( 136      ( 24 May 2024 06:22 )             34.1     05-24    137  |  102  |  64<H>  ----------------------------<  104<H>  3.7   |  24  |  2.10<H>    Ca    8.9      24 May 2024 06:22  Phos  5.1     05-23  Mg     2.3     05-23    TPro  7.0  /  Alb  3.0<L>  /  TBili  2.0<H>  /  DBili  x   /  AST  44<H>  /  ALT  35  /  AlkPhos  294<H>  05-24          ECG: Sinus rhythm 1st degree block with PVCs      TTE: < from: TTE Echo Complete w/o Contrast w/ Doppler (04.07.24 @ 11:12) >   1. Left ventricular ejection fraction, by visual estimation, is 25 to 30%.   2. Severely decreased global left ventricular systolic function.   3. Left atrial enlargement.   4. Increased LV wall thickness.   5. Spectral Dopplershows impaired relaxation pattern of left   ventricular myocardial filling (Grade I diastolic dysfunction).   6. There is mild concentric left ventricular hypertrophy.   7. Mildly enlarged right ventricle.   8. Right atrial enlargement.   9. Mild to moderate mitral valve regurgitation.  10. Moderate tricuspid regurgitation.  11. Mild aortic regurgitation.  12. Mild pulmonic valve regurgitation.       ELYSE MALAVE  [id number]    Chief Complaint: HFrEF  Interval events: pt seen and examined, labs and chart reviewed. denies sob or cp. sinus 80s on tele    ALLERGIES:  No Known Allergies      PAST MEDICAL & SURGICAL HISTORY:  BPH (benign prostatic hyperplasia)      Bipolar disorder      Depression      Anxiety      Umbilical hernia, incarcerated      Depression      Constipation      Urinary retention      CAD (coronary artery disease)      SCC (squamous cell carcinoma)      Lung mass      Other nonspecific abnormal finding of lung field      LV (left ventricular) mural thrombus      History of inguinal hernia      History of CHF (congestive heart failure)      History of arthroscopy of knee  Right, 1987      History of tonsillectomy  1952      History of hernia repair      H/O coronary angiogram            CURRENT MEDICATIONS:    MEDICATIONS  (STANDING):  aspirin enteric coated 81 milliGRAM(s) Oral daily  atorvastatin 80 milliGRAM(s) Oral at bedtime  buMETAnide 2 milliGRAM(s) Oral daily  clopidogrel Tablet 75 milliGRAM(s) Oral daily  heparin   Injectable 5000 Unit(s) SubCutaneous every 8 hours  latanoprost 0.005% Ophthalmic Solution 1 Drop(s) Both EYES at bedtime  metoprolol succinate ER 25 milliGRAM(s) Oral daily  mirtazapine 15 milliGRAM(s) Oral at bedtime  polyethylene glycol 3350 17 Gram(s) Oral two times a day  tamsulosin 0.4 milliGRAM(s) Oral <User Schedule>  tamsulosin 0.8 milliGRAM(s) Oral <User Schedule>    MEDICATIONS  (PRN):  acetaminophen     Tablet .. 650 milliGRAM(s) Oral every 6 hours PRN Temp greater or equal to 38C (100.4F), Mild Pain (1 - 3)  aluminum hydroxide/magnesium hydroxide/simethicone Suspension 30 milliLiter(s) Oral every 4 hours PRN Dyspepsia  melatonin 3 milliGRAM(s) Oral at bedtime PRN Insomnia  ondansetron Injectable 4 milliGRAM(s) IV Push every 8 hours PRN Nausea and/or Vomiting      SOCIAL HISTORY:  denies    FAMILY HISTORY:  Family history of depression (Mother)    FH: CAD (coronary artery disease) (Sibling, Sibling)    Family history of diabetes mellitus (DM) (Sibling)        ROS:  All 10 systems reviewed and positives noted in HPI    OBJECTIVE:    VITAL SIGNS:  Vital Signs Last 24 Hrs  T(C): 36.3 (24 May 2024 05:00), Max: 36.7 (23 May 2024 12:34)  T(F): 97.4 (24 May 2024 05:00), Max: 98.1 (23 May 2024 12:34)  HR: 81 (24 May 2024 05:00) (79 - 82)  BP: 103/67 (24 May 2024 05:00) (101/66 - 114/78)  BP(mean): --  RR: 17 (24 May 2024 05:00) (17 - 18)  SpO2: 95% (24 May 2024 05:00) (95% - 96%)    Parameters below as of 24 May 2024 05:00  Patient On (Oxygen Delivery Method): room air        PHYSICAL EXAM:  General: no distress, jaundice  HEENT: sclera anicteric  Neck: supple, no carotid bruits b/l  CVS: JVP ~ 7 cm H20, RRR, s1, s2, no murmurs/rubs/gallops  Chest: unlabored respirations, clear to auscultation b/l  Abdomen: non-distended  Extremities: no lower extremity edema b/l  Neuro: awake, alert & oriented x 3      LABS:                        11.3   5.45  )-----------( 136      ( 24 May 2024 06:22 )             34.1     05-24    137  |  102  |  64<H>  ----------------------------<  104<H>  3.7   |  24  |  2.10<H>    Ca    8.9      24 May 2024 06:22  Phos  5.1     05-23  Mg     2.3     05-23    TPro  7.0  /  Alb  3.0<L>  /  TBili  2.0<H>  /  DBili  x   /  AST  44<H>  /  ALT  35  /  AlkPhos  294<H>  05-24          ECG: Sinus rhythm 1st degree block with PVCs      TTE: < from: TTE Echo Complete w/o Contrast w/ Doppler (04.07.24 @ 11:12) >   1. Left ventricular ejection fraction, by visual estimation, is 25 to 30%.   2. Severely decreased global left ventricular systolic function.   3. Left atrial enlargement.   4. Increased LV wall thickness.   5. Spectral Dopplershows impaired relaxation pattern of left   ventricular myocardial filling (Grade I diastolic dysfunction).   6. There is mild concentric left ventricular hypertrophy.   7. Mildly enlarged right ventricle.   8. Right atrial enlargement.   9. Mild to moderate mitral valve regurgitation.  10. Moderate tricuspid regurgitation.  11. Mild aortic regurgitation.  12. Mild pulmonic valve regurgitation.

## 2024-05-24 NOTE — DISCHARGE NOTE NURSING/CASE MANAGEMENT/SOCIAL WORK - NSDCVIVACCINE_GEN_ALL_CORE_FT
COVID-19, mRNA, LNP-S, PF, 100 mcg/ 0.5 mL dose (Moderna); 17-May-2022 14:07; Pankaj Velazco (RN); [employer] [id number] (Exp. Date: 05-Jun-2022); IntraMuscular; Deltoid Left.; 0.25 milliLiter(s);

## 2024-05-24 NOTE — CONSULT NOTE ADULT - NS ATTEND AMEND GEN_ALL_CORE FT
Patient seen and examined at the bedside. Agree with the assessment and plan of ELDON Campoverde.  Suspect abnormal liver tests related to CHF exacerbation/hepatic congestion. Expect improvement with CHF treatment.  Imaging did not show any acute issues, no evidence of biliary obstruction.  Monitor liver tests while inpatient.  Gallbladder findings may also reflect edema secondary to CHF. He denies abdominal pain at present - surgical evaluation at discretion of primary team but low suspicion at this time for acute cholecystitis.

## 2024-05-24 NOTE — CONSULT NOTE ADULT - SUBJECTIVE AND OBJECTIVE BOX
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  Patient accompanied by his wife Kenia who provided collateral information. Patient was otherwise doing well until the last 2 weeks when she noted ~6 pound weight gain associated with increased abd girth. Over the last week, she has been giving him alternating bumex 1mg and 2mg without significant change. Over the last 3 days, patient complained of shortness of breath with minimal exertion and insomnia so was brought in for evaluation.  Patient denies chest pain/palpitation/LH/dizziness. Rest of ROS is negative.  He has been compliant with low salt diet and all his medications.    In the ED, he was afebrile and hemodynamically stable, O2Sat 94% on RA.  Labs notable for BRODY on CKD w/ BUN/Cr 56/1.95, t bili 1.9, alk phos 283, trop neg, BNP 4697.   CXR personally reviewed showed possible increased vascular congestion, official read pending  He was given Bumex 2mg IVP x 1.  He is being admitted for further management.         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  Patient accompanied by his wife Kenia who provided collateral information. Patient was otherwise doing well until the last 2 weeks when she noted ~6 pound weight gain associated with increased abd girth. Over the last week, she has been giving him alternating bumex 1mg and 2mg without significant change. Over the last 3 days, patient complained of shortness of breath with minimal exertion and insomnia so was brought in for evaluation.  Patient denies chest pain/palpitation/LH/dizziness. Rest of ROS is negative.  He has been compliant with low salt diet and all his medications.    In the ED, he was afebrile and hemodynamically stable, O2Sat 94% on RA.  Labs notable for BRODY on CKD w/ BUN/Cr 56/1.95, t bili 1.9, alk phos 283, trop neg, BNP 4697.   CXR personally reviewed showed possible increased vascular congestion, official read pending  He was given Bumex 2mg IVP x 1.  He is being admitted for further management.         patient interviewed and examined in [facility] with his wife present      says hes never had abdominal pain      he was in a [place] hospital in February for CHF and had a "consult about gallbladder problem" but was  told not to do anything with it"    is mildly short of breath on exertion    afebrile    vitals stable    Heent-sclera anicteric    abdomen-protuberant, soft and non tender    labs:    wbc 5  h/h  11/34  plt 136    k+ 3.7    creat 2    bili 2      alk phos 296(dec from 316)    imaging(I personally reviewed):    < from: CT Abdomen and Pelvis No Cont (05.24.24 @ 08:58) >    IMPRESSION:  Cholelithiasis with  nonspecific gallbladder wall thickening. Correlate   with symptomatology right upper quadrant ultrasound    Small ascites.    Additional findings as discussed      < end of copied text >

## 2024-05-24 NOTE — CONSULT NOTE ADULT - ASSESSMENT
a/p    Doubt acute cholecystitis. Although he does have gallstones he has normal wbc, normal exam and no hx of abdominal pain.  Also, he has chf which may explain the fluid around gallbladder.    At this time, I see no indication for cholecystectomy. Although, at some point in future he may need to have repeat imaging when he is ptimized from a cardiac perspective.  I believe he should be optimized and after discharge told to followup in my office.    Thank you    dr jose gonzalez  cell#[phone]

## 2024-05-24 NOTE — CONSULT NOTE ADULT - ASSESSMENT
GI consulted to see patient due to elevated LFTs. Patient seen and examined. Denies nausea, vomiting, abdominal pain. Denies ETOH use. Patient wife reports similar episode in [facility] where patients LFTS elevated during hospitalization for CHF exacerbation. Last colonoscopy 5 years ago, reportedly normal. 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  GI consulted to see patient due to elevated LFTs.

## 2024-05-24 NOTE — DISCHARGE NOTE NURSING/CASE MANAGEMENT/SOCIAL WORK - PATIENT PORTAL LINK FT
You can access the FollowMyHealth Patient Portal offered by [facility] by registering at the following website: [email or web address]. By joining [facility]’s FollowMyHealth portal, you will also be able to view your health information using other applications (apps) compatible with our system.

## 2024-05-24 NOTE — PROGRESS NOTE ADULT - SUBJECTIVE AND OBJECTIVE BOX
Feels better    Vital Signs Last 24 Hrs  T(C): 37.2 (05-24-24 @ 11:20), Max: 37.2 (05-24-24 @ 11:20)  T(F): 98.9 (05-24-24 @ 11:20), Max: 98.9 (05-24-24 @ 11:20)  HR: 80 (05-24-24 @ 11:20) (79 - 81)  BP: 113/66 (05-24-24 @ 11:20) (103/67 - 114/78)  RR: 17 (05-24-24 @ 11:20) (17 - 18)  SpO2: 97% (05-24-24 @ 11:20) (95% - 97%)    I&O's Detail    23 May 2024 07:01  -  24 May 2024 07:00  --------------------------------------------------------  IN:    Oral Fluid: 1148 mL  Total IN: 1148 mL    OUT:    Voided (mL): 1200 mL  Total OUT: 1200 mL    24 May 2024 07:01  -  24 May 2024 18:06  --------------------------------------------------------  IN:    Oral Fluid: 200 mL  Total IN: 200 mL    OUT:    Voided (mL): 400 mL  Total OUT: 400 mL    s1s2  b/l air entry  soft, ND  tr edema                          11.3   5.45  )-----------( 136      ( 24 May 2024 06:22 )             34.1     24 May 2024 06:22    137    |  102    |  64     ----------------------------<  104    3.7     |  24     |  2.10     Ca    8.9        24 May 2024 06:22  Phos  5.1       23 May 2024 06:37  Mg     2.3       23 May 2024 06:37    TPro  7.0    /  Alb  3.0    /  TBili  2.0    /  DBili  x      /  AST  44     /  ALT  35     /  AlkPhos  294    24 May 2024 06:22    LIVER FUNCTIONS - ( 24 May 2024 06:22 )  Alb: 3.0 g/dL / Pro: 7.0 g/dL / ALK PHOS: 294 U/L / ALT: 35 U/L / AST: 44 U/L / GGT: x           acetaminophen     Tablet .. 650 milliGRAM(s) Oral every 6 hours PRN  aluminum hydroxide/magnesium hydroxide/simethicone Suspension 30 milliLiter(s) Oral every 4 hours PRN  aspirin enteric coated 81 milliGRAM(s) Oral daily  atorvastatin 80 milliGRAM(s) Oral at bedtime  buMETAnide 2 milliGRAM(s) Oral daily  clopidogrel Tablet 75 milliGRAM(s) Oral daily  heparin   Injectable 5000 Unit(s) SubCutaneous every 8 hours  latanoprost 0.005% Ophthalmic Solution 1 Drop(s) Both EYES at bedtime  metoprolol succinate ER 25 milliGRAM(s) Oral daily  mirtazapine 15 milliGRAM(s) Oral at bedtime  ondansetron Injectable 4 milliGRAM(s) IV Push every 8 hours PRN  polyethylene glycol 3350 17 Gram(s) Oral two times a day  tamsulosin 0.8 milliGRAM(s) Oral at bedtime  temazepam 7.5 milliGRAM(s) Oral at bedtime PRN    A/P:    CM, EF 25 - 30%, lung ca   Cardio-renal BRODY/CKD 3  SONO w/o acute findings   UA negative   Continue Bumex   Would avoid ACE/ARB, entresto, farxiga on this adm  Cards, renal f/u as op  F/u BMP, UO  Avoid nephrotoxins as able   May have to accept higher Cr to be able to optimize volume status  Consider Pulm eval    [phone]

## 2024-05-24 NOTE — PROGRESS NOTE ADULT - NS ATTEND AMEND GEN_ALL_CORE FT
-  -  79-year-old man admitted with complaints of dyspnea and weight gain, consistent with acute exacerbation of chronic systolic heart failure.  Patient diuresed with intravenous Bumex, and is now feeling better.  He has cardiac history of ischemic cardiomyopathy, HFrEF, followed by heart failure team at [place].  Medical issues include bipolar disorder, lung cancer, status post lobectomy and radiation therapy.  On physical examination today, blood pressure stable.  Elevated JVD suggesting hypervolemia.  Echocardiogram as noted above with severe global left ventricular systolic dysfunction.  Blood work consistent with renal insufficiency and elevated LFTs     Recommendations  -Continue  oral diuretics.  -Guideline directed medical therapy is indicated.  Hold Entresto and spironolactone currently due to renal insufficiency.  -Start long-acting beta-blocker, consider carvedilol, with hold parameters   -Follow up CT abdomen done earlier today   -Monitor labs and electrolytes.  -I will sign out for cardiology to follow along tomorrow   -Discussed with patient and with primary team.

## 2024-05-24 NOTE — CONSULT NOTE ADULT - SUBJECTIVE AND OBJECTIVE BOX
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  Patient accompanied by his wife Kenia who provided collateral information. Patient was otherwise doing well until the last 2 weeks when she noted ~6 pound weight gain associated with increased abd girth. Over the last week, she has been giving him alternating bumex 1mg and 2mg without significant change. Over the last 3 days, patient complained of shortness of breath with minimal exertion and insomnia so was brought in for evaluation.  Patient denies chest pain/palpitation/LH/dizziness. Rest of ROS is negative.  He has been compliant with low salt diet and all his medications.    In the ED, he was afebrile and hemodynamically stable, O2Sat 94% on RA.  Labs notable for BRODY on CKD w/ BUN/Cr 56/1.95, t bili 1.9, alk phos 283, trop neg, BNP 4697.   CXR personally reviewed showed possible increased vascular congestion, official read pending  He was given Bumex 2mg IVP x 1.  He is being admitted for further management.    (22 May 2024 16:39)    GI consulted to see patient due to elevated LFTs. Patient seen and examined. Denies nausea, vomiting, abdominal pain     MEDICATIONS  (STANDING):  aspirin enteric coated 81 milliGRAM(s) Oral daily  atorvastatin 80 milliGRAM(s) Oral at bedtime  buMETAnide 2 milliGRAM(s) Oral daily  clopidogrel Tablet 75 milliGRAM(s) Oral daily  heparin   Injectable 5000 Unit(s) SubCutaneous every 8 hours  latanoprost 0.005% Ophthalmic Solution 1 Drop(s) Both EYES at bedtime  metoprolol succinate ER 25 milliGRAM(s) Oral daily  mirtazapine 15 milliGRAM(s) Oral at bedtime  polyethylene glycol 3350 17 Gram(s) Oral two times a day  tamsulosin 0.8 milliGRAM(s) Oral <User Schedule>  tamsulosin 0.4 milliGRAM(s) Oral <User Schedule>    MEDICATIONS  (PRN):  acetaminophen     Tablet .. 650 milliGRAM(s) Oral every 6 hours PRN Temp greater or equal to 38C (100.4F), Mild Pain (1 - 3)  aluminum hydroxide/magnesium hydroxide/simethicone Suspension 30 milliLiter(s) Oral every 4 hours PRN Dyspepsia  melatonin 3 milliGRAM(s) Oral at bedtime PRN Insomnia  ondansetron Injectable 4 milliGRAM(s) IV Push every 8 hours PRN Nausea and/or Vomiting      Allergies    No Known Allergies    Intolerances        PAST MEDICAL & SURGICAL HISTORY:  BPH (benign prostatic hyperplasia)      Bipolar disorder      Depression      Anxiety      Umbilical hernia, incarcerated      Depression      Constipation      Urinary retention      CAD (coronary artery disease)      SCC (squamous cell carcinoma)      Lung mass      Other nonspecific abnormal finding of lung field      LV (left ventricular) mural thrombus      History of inguinal hernia      History of CHF (congestive heart failure)      History of arthroscopy of knee  Right, 1987      History of tonsillectomy  1952      History of hernia repair      H/O coronary angiogram          REVIEW OF SYSTEMS: negative unless indicated in HPI      PHYSICAL EXAM:   Vital Signs:  Vital Signs Last 24 Hrs  T(C): 37.2 (24 May 2024 11:20), Max: 37.2 (24 May 2024 11:20)  T(F): 98.9 (24 May 2024 11:20), Max: 98.9 (24 May 2024 11:20)  HR: 80 (24 May 2024 11:20) (79 - 81)  BP: 113/66 (24 May 2024 11:20) (103/67 - 114/78)  BP(mean): --  RR: 17 (24 May 2024 11:20) (17 - 18)  SpO2: 97% (24 May 2024 11:20) (95% - 97%)    Parameters below as of 24 May 2024 11:20  Patient On (Oxygen Delivery Method): room air      Daily Height in cm: 182.88 (24 May 2024 06:22)    Daily I&O's Summary    23 May 2024 07:01  -  24 May 2024 07:00  --------------------------------------------------------  IN: 1148 mL / OUT: 1200 mL / NET: -52 mL    24 May 2024 07:01  -  24 May 2024 12:38  --------------------------------------------------------  IN: 200 mL / OUT: 400 mL / NET: -200 mL        GENERAL:  Appears stated age,  HEENT:  NC/AT,  conjunctivae clear and pink, sclera -anicteric  CHEST:  Full & symmetric excursion, no increased effort, breath sounds clear  HEART:  Regular rhythm, S1, S2, no murmur  ABDOMEN:  Soft, non-tender, non-distended, normoactive bowel sounds,  EXTEREMITIES:  no edema  SKIN:  No rash/warm/dry  NEURO:  Alert, oriented, no asterixis, no tremor, no encephalopathy      LABS:                        11.3   5.45  )-----------( 136      ( 24 May 2024 06:22 )             34.1     05-24    137  |  102  |  64<H>  ----------------------------<  104<H>  3.7   |  24  |  2.10<H>    Ca    8.9      24 May 2024 06:22  Phos  5.1     05-23  Mg     2.3     05-23    TPro  7.0  /  Alb  3.0<L>  /  TBili  2.0<H>  /  DBili  x   /  AST  44<H>  /  ALT  35  /  AlkPhos  294<H>  05-24      Urinalysis Basic - ( 24 May 2024 06:22 )    Color: x / Appearance: x / SG: x / pH: x  Gluc: 104 mg/dL / Ketone: x  / Bili: x / Urobili: x   Blood: x / Protein: x / Nitrite: x   Leuk Esterase: x / RBC: x / WBC x   Sq Epi: x / Non Sq Epi: x / Bacteria: x      amylase   lipaseLipase: 40 U/L (05-23 @ 10:42)    RADIOLOGY & ADDITIONAL TESTS:   INTERVAL HPI/OVERNIGHT EVENTS:  HPI:  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  Patient accompanied by his wife Kenia who provided collateral information. Patient was otherwise doing well until the last 2 weeks when she noted ~6 pound weight gain associated with increased abd girth. Over the last week, she has been giving him alternating bumex 1mg and 2mg without significant change. Over the last 3 days, patient complained of shortness of breath with minimal exertion and insomnia so was brought in for evaluation.  Patient denies chest pain/palpitation/LH/dizziness. Rest of ROS is negative.  He has been compliant with low salt diet and all his medications.      GI consulted to see patient due to elevated LFTs. Patient seen and examined. Denies nausea, vomiting, abdominal pain. Denies ETOH use. Patient wife reports similar episode in [facility] where patients LFTS elevated during hospitalization for CHF exacerbation. Last colonoscopy 5 years ago, reportedly normal.     MEDICATIONS  (STANDING):  aspirin enteric coated 81 milliGRAM(s) Oral daily  atorvastatin 80 milliGRAM(s) Oral at bedtime  buMETAnide 2 milliGRAM(s) Oral daily  clopidogrel Tablet 75 milliGRAM(s) Oral daily  heparin   Injectable 5000 Unit(s) SubCutaneous every 8 hours  latanoprost 0.005% Ophthalmic Solution 1 Drop(s) Both EYES at bedtime  metoprolol succinate ER 25 milliGRAM(s) Oral daily  mirtazapine 15 milliGRAM(s) Oral at bedtime  polyethylene glycol 3350 17 Gram(s) Oral two times a day  tamsulosin 0.8 milliGRAM(s) Oral <User Schedule>  tamsulosin 0.4 milliGRAM(s) Oral <User Schedule>    MEDICATIONS  (PRN):  acetaminophen     Tablet .. 650 milliGRAM(s) Oral every 6 hours PRN Temp greater or equal to 38C (100.4F), Mild Pain (1 - 3)  aluminum hydroxide/magnesium hydroxide/simethicone Suspension 30 milliLiter(s) Oral every 4 hours PRN Dyspepsia  melatonin 3 milliGRAM(s) Oral at bedtime PRN Insomnia  ondansetron Injectable 4 milliGRAM(s) IV Push every 8 hours PRN Nausea and/or Vomiting      Allergies    No Known Allergies    Intolerances        PAST MEDICAL & SURGICAL HISTORY:  BPH (benign prostatic hyperplasia)      Bipolar disorder      Depression      Anxiety      Umbilical hernia, incarcerated      Depression      Constipation      Urinary retention      CAD (coronary artery disease)      SCC (squamous cell carcinoma)      Lung mass      Other nonspecific abnormal finding of lung field      LV (left ventricular) mural thrombus      History of inguinal hernia      History of CHF (congestive heart failure)      History of arthroscopy of knee  Right, 1987      History of tonsillectomy  1952      History of hernia repair      H/O coronary angiogram          REVIEW OF SYSTEMS: negative unless indicated in HPI      PHYSICAL EXAM:   Vital Signs:  Vital Signs Last 24 Hrs  T(C): 37.2 (24 May 2024 11:20), Max: 37.2 (24 May 2024 11:20)  T(F): 98.9 (24 May 2024 11:20), Max: 98.9 (24 May 2024 11:20)  HR: 80 (24 May 2024 11:20) (79 - 81)  BP: 113/66 (24 May 2024 11:20) (103/67 - 114/78)  BP(mean): --  RR: 17 (24 May 2024 11:20) (17 - 18)  SpO2: 97% (24 May 2024 11:20) (95% - 97%)    Parameters below as of 24 May 2024 11:20  Patient On (Oxygen Delivery Method): room air      Daily Height in cm: 182.88 (24 May 2024 06:22)    Daily I&O's Summary    23 May 2024 07:01  -  24 May 2024 07:00  --------------------------------------------------------  IN: 1148 mL / OUT: 1200 mL / NET: -52 mL    24 May 2024 07:01  -  24 May 2024 12:38  --------------------------------------------------------  IN: 200 mL / OUT: 400 mL / NET: -200 mL        GENERAL:  Appears stated age,  HEENT:  NC/AT,  conjunctivae clear and pink, sclera -anicteric  CHEST:  Full & symmetric excursion, no increased effort, breath sounds clear  HEART:  Regular rhythm, S1, S2, no murmur  ABDOMEN:  Soft, non-tender, non-distended, normoactive bowel sounds,  EXTEREMITIES:  no edema  SKIN:  No rash/warm/dry  NEURO:  Alert, oriented, no asterixis, no tremor, no encephalopathy      LABS:                        11.3   5.45  )-----------( 136      ( 24 May 2024 06:22 )             34.1     05-24    137  |  102  |  64<H>  ----------------------------<  104<H>  3.7   |  24  |  2.10<H>    Ca    8.9      24 May 2024 06:22  Phos  5.1     05-23  Mg     2.3     05-23    TPro  7.0  /  Alb  3.0<L>  /  TBili  2.0<H>  /  DBili  x   /  AST  44<H>  /  ALT  35  /  AlkPhos  294<H>  05-24      Urinalysis Basic - ( 24 May 2024 06:22 )    Color: x / Appearance: x / SG: x / pH: x  Gluc: 104 mg/dL / Ketone: x  / Bili: x / Urobili: x   Blood: x / Protein: x / Nitrite: x   Leuk Esterase: x / RBC: x / WBC x   Sq Epi: x / Non Sq Epi: x / Bacteria: x      amylase   lipaseLipase: 40 U/L (05-23 @ 10:42)    RADIOLOGY & ADDITIONAL TESTS:   GI Consult    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  Patient accompanied by his wife Kenia who provided collateral information. Patient was otherwise doing well until the last 2 weeks when she noted ~6 pound weight gain associated with increased abd girth. Over the last week, she has been giving him alternating bumex 1mg and 2mg without significant change. Over the last 3 days, patient complained of shortness of breath with minimal exertion and insomnia so was brought in for evaluation.  Patient denies chest pain/palpitation/LH/dizziness. Rest of ROS is negative.  He has been compliant with low salt diet and all his medications.    GI consulted to see patient due to elevated LFTs. Patient seen and examined. Denies nausea, vomiting, abdominal pain. Denies ETOH use. Patient wife reports similar episode in [place] where patient's liver tests were elevated during hospitalization for CHF exacerbation.   Last colonoscopy 5 years ago, reportedly normal.   Denies abdominal pain, pruritus, jaundice.      MEDICATIONS  (STANDING):  aspirin enteric coated 81 milliGRAM(s) Oral daily  atorvastatin 80 milliGRAM(s) Oral at bedtime  buMETAnide 2 milliGRAM(s) Oral daily  clopidogrel Tablet 75 milliGRAM(s) Oral daily  heparin   Injectable 5000 Unit(s) SubCutaneous every 8 hours  latanoprost 0.005% Ophthalmic Solution 1 Drop(s) Both EYES at bedtime  metoprolol succinate ER 25 milliGRAM(s) Oral daily  mirtazapine 15 milliGRAM(s) Oral at bedtime  polyethylene glycol 3350 17 Gram(s) Oral two times a day  tamsulosin 0.8 milliGRAM(s) Oral <User Schedule>  tamsulosin 0.4 milliGRAM(s) Oral <User Schedule>    MEDICATIONS  (PRN):  acetaminophen     Tablet .. 650 milliGRAM(s) Oral every 6 hours PRN Temp greater or equal to 38C (100.4F), Mild Pain (1 - 3)  aluminum hydroxide/magnesium hydroxide/simethicone Suspension 30 milliLiter(s) Oral every 4 hours PRN Dyspepsia  melatonin 3 milliGRAM(s) Oral at bedtime PRN Insomnia  ondansetron Injectable 4 milliGRAM(s) IV Push every 8 hours PRN Nausea and/or Vomiting      Allergies    No Known Allergies    Intolerances        PAST MEDICAL & SURGICAL HISTORY:  BPH (benign prostatic hyperplasia)      Bipolar disorder      Depression      Anxiety      Umbilical hernia, incarcerated      Depression      Constipation      Urinary retention      CAD (coronary artery disease)      SCC (squamous cell carcinoma)      Lung mass      Other nonspecific abnormal finding of lung field      LV (left ventricular) mural thrombus      History of inguinal hernia      History of CHF (congestive heart failure)      History of arthroscopy of knee  Right, 1987      History of tonsillectomy  1952      History of hernia repair      H/O coronary angiogram      Social History: no t/a/d    FAMILY HISTORY:  Family history of depression (Mother)    FH: CAD (coronary artery disease) (Sibling, Sibling)    Family history of diabetes mellitus (DM) (Sibling)            REVIEW OF SYSTEMS: GI ROS per HPI      PHYSICAL EXAM:   Vital Signs:  Vital Signs Last 24 Hrs  T(C): 37.2 (24 May 2024 11:20), Max: 37.2 (24 May 2024 11:20)  T(F): 98.9 (24 May 2024 11:20), Max: 98.9 (24 May 2024 11:20)  HR: 80 (24 May 2024 11:20) (79 - 81)  BP: 113/66 (24 May 2024 11:20) (103/67 - 114/78)  BP(mean): --  RR: 17 (24 May 2024 11:20) (17 - 18)  SpO2: 97% (24 May 2024 11:20) (95% - 97%)    Parameters below as of 24 May 2024 11:20  Patient On (Oxygen Delivery Method): room air      Daily Height in cm: 182.88 (24 May 2024 06:22)    Daily I&O's Summary    23 May 2024 07:01  -  24 May 2024 07:00  --------------------------------------------------------  IN: 1148 mL / OUT: 1200 mL / NET: -52 mL    24 May 2024 07:01  -  24 May 2024 12:38  --------------------------------------------------------  IN: 200 mL / OUT: 400 mL / NET: -200 mL        GENERAL:  Appears stated age, NAD at present, resting comfortably  HEENT:  NC/AT,  conjunctivae clear and pink, sclera anicteric  CHEST:  Full & symmetric excursion, no increased effort, breath sounds clear  HEART:  Regular rhythm, S1, S2+  ABDOMEN:  Soft, non-tender, non-distended, normoactive bowel sounds  EXTREMITIES:  no edema  SKIN:  No rash or jaundice  NEURO:  Alert, oriented, no asterixis, no tremor, no encephalopathy        LABS:                        11.3   5.45  )-----------( 136      ( 24 May 2024 06:22 )             34.1     05-24    137  |  102  |  64<H>  ----------------------------<  104<H>  3.7   |  24  |  2.10<H>    Ca    8.9      24 May 2024 06:22  Phos  5.1     05-23  Mg     2.3     05-23    TPro  7.0  /  Alb  3.0<L>  /  TBili  2.0<H>  /  DBili  x   /  AST  44<H>  /  ALT  35  /  AlkPhos  294<H>  05-24      Urinalysis Basic - ( 24 May 2024 06:22 )    Color: x / Appearance: x / SG: x / pH: x  Gluc: 104 mg/dL / Ketone: x  / Bili: x / Urobili: x   Blood: x / Protein: x / Nitrite: x   Leuk Esterase: x / RBC: x / WBC x   Sq Epi: x / Non Sq Epi: x / Bacteria: x    Lipase: 40 U/L (05-23 @ 10:42)        RADIOLOGY & ADDITIONAL TESTS:    ACC: [id number] EXAM:  CT ABDOMEN AND PELVIS   ORDERED BY:  JANETT MARTE     PROCEDURE DATE:  05/24/2024          INTERPRETATION:  CLINICAL INFORMATION: Rule out gallbladder, liver   pathology    COMPARISON: None.    CONTRAST/COMPLICATIONS:  IV Contrast: NONE  Oral Contrast: NONE  Complications: None reported at time of study completion    PROCEDURE:  CT of the Abdomen and Pelvis was performed.  Sagittal and coronal reformats were performed.    FINDINGS:  LOWER CHEST: Trace left small to moderate right pleural effusion.   Cardiomegaly with coronary artery calcification.    LIVER: Too small to characterize hypodensity left lobe.  BILE DUCTS: Normal caliber.  GALLBLADDER: Cholelithiasis with gallbladder wall thickening. Correlate   with symptomatology right upper quadrant ultrasound  SPLEEN: Within normal limits.  PANCREAS: Within normal limits.  ADRENALS: 2.4 cm left adrenal adenoma  KIDNEYS/URETERS: No hydronephrosis. Right renal cyst with peripheral   calcification and too small to characterize cortical hypodensity    BLADDER: Within normal limits.  REPRODUCTIVE ORGANS: Enlarged prostate    BOWEL: No bowel obstruction. Appendix no appendicitis  PERITONEUM: Small ascites  VESSELS: Nonaneurysmal.  RETROPERITONEUM/LYMPH NODES: Subcentimeter retroperitoneal lymph nodes   likely reactive.  ABDOMINAL WALL: Lower anterior abdominal wall hernia mesh.  BONES: Degenerative changes.    IMPRESSION:  Cholelithiasis with  nonspecific gallbladder wall thickening. Correlate   with symptomatology right upper quadrant ultrasound    Small ascites.    Additional findings as discussed    --- End of Report ---            MONI FIERRO MD; Attending Radiologist  This document has been electronically signed. May 24 2024  9:20AM          XXXXX    XXXXX      ACC: [id number] EXAM:  US ABDOMEN RT UPR QUADRANT   ORDERED BY:  JANETT MARTE     PROCEDURE DATE:  05/23/2024          INTERPRETATION:  CLINICAL INFORMATION: Elevated bilirubin    COMPARISON: PET/CT 5/5/2024    TECHNIQUE: Sonography of the right upper quadrant.    FINDINGS:  Liver: Within normal limits.  Bile ducts: Normal caliber. Common bile duct measures 4 mm.  Gallbladder: Subcentimeter size gallstones, gallbladder mural thickening   without palpable tenderness  Pancreas: Visualized portions are within normal limits.  Right kidney: 13.4 cm. No hydronephrosis. Renal cysts measure up to 9.4   cm, unchanged  Ascites: None. Small RIGHT pleural effusion  IVC: Visualized portions are within normal limits.    IMPRESSION:  Gallstones, gallbladder mural thickening without palpable tenderness, not   significantly changed from 5/5/2024 , when allowing for differences in   modality. . Clinical correlation may determine need for hepatobiliary   scintigraphy    --- End of Report ---            DAYNE ANDERS MD; Attending Radiologist  This document has been electronically signed. May 23 2024 11:45AM

## 2024-05-24 NOTE — PROGRESS NOTE ADULT - ASSESSMENT
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    #Acute Decompensated Heart Failure  #History of Combined Systolic and Diastolic HF with EF 25-30% (Echo 4/2024)  #History of CAD/Valvular Disease (Mild-Mod MR, Moderate TR)  Clinically Euvolemic  TTE 4/2024: EF 25-30%, grade1 diastolic dysfunction, Mild-moderate MR, Moderate TR  -cardiology consult  -Daily weight. Strict in's and out's, telemetry  -stop IV bumex, 1mg PO this PM then 2mg PO daily  -Continue aspirin, plavix  -will add coreg pending cardiology rec  -Hold spironolactone and farxiga in setting of BRODY on CKD for now, resume as appropriate    #BRODY on CKD stage III 2/2 possible cardio-renal syndrome vs. over-diuresis   Baseline Cr ~1.3-1.5 range  Cr: 1.95>2.11  In setting of diuresis/volume overload  -Nephrology Consult  -Avoid nephrotoxic agents  -Monitor BMP    #Elevated T bili/Alk Phos/mild transaminitis  -He has no abdominal pain, possibly due to hepatic congestion  -Monitor CMP  -hold statin  -f/u direct bili  -f/u US RUQ    #Anxiety/Depression/Bipolar Disorder  -Continue prozac, mirtazapine    #BPH  -Continue flomax (takes alternating 0.4mg and 0.8mg)    #History of Lung Cancer  #HARRY Lung Nodule  -s/p RUL lobectomy (9/2022) with recurrent disease s/p radiation to left lung (completed 3/2023)  -Recent PET showed The LEFT lung nodule does appear increasingly rounded solid, and although the degree of uptake there is essentially unchanged. The RIGHT lung appears to have slowly become denser over time, with the appearance of uptake there   are also appearing slightly more prominent.   -Follows with Dr. Alejandre for surveillance    #DVT PPx  -Heparin SC    #FULL CODE    Med-rec re-done: patient not taking many of his medications as prescribed. Re-ordered medications as prescribed.     Discussed with Dr. 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    #Acute Decompensated Heart Failure  #History of Combined Systolic and Diastolic HF with EF 25-30% (Echo 4/2024)  #History of CAD/Valvular Disease (Mild-Mod MR, Moderate TR)  Clinically Euvolemic  TTE 4/2024: EF 25-30%, grade1 diastolic dysfunction, Mild-moderate MR, Moderate TR  -cardiology consult apprec  -Daily weight. Strict in's and out's, telemetry  -c/w toprol 25mg qd  -c/w aspirin, plavix  -c/w statin  -holding bumex due to BRODY  -Hold spironolactone and farxiga in setting of BRODY on CKD for now, resume as appropriate    #BRODY on CKD stage III 2/2 possible cardio-renal syndrome vs. over-diuresis   Baseline Cr ~1.3-1.5 range  Cr: 1.95>2.11>2.10  -Nephrology Consult apprec  -Avoid nephrotoxic agents  -Monitor BMP    #Elevated T bili/Alk Phos/mild transaminitis  He has no abdominal pain, possibly due to hepatic congestion  Elevated GGT, elevated direct bili  RUQ US with gallstone  -Monitor CMP      #Anxiety/Depression/Bipolar Disorder  -Continue prozac, mirtazapine    #BPH  -Continue flomax (takes alternating 0.4mg and 0.8mg)    #History of Lung Cancer  #HARRY Lung Nodule  -s/p RUL lobectomy (9/2022) with recurrent disease s/p radiation to left lung (completed 3/2023)  -Recent PET showed The LEFT lung nodule does appear increasingly rounded solid, and although the degree of uptake there is essentially unchanged. The RIGHT lung appears to have slowly become denser over time, with the appearance of uptake there   are also appearing slightly more prominent.   -Follows with Dr. Alejandre for surveillance    #DVT PPx  -Heparin SC    #FULL CODE    Dispo: Home when optimized  Diet: DASH    Discussed with Dr. 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    #Acute Decompensated Heart Failure  #History of Combined Systolic and Diastolic HF with EF 25-30% (Echo 4/2024)  #History of CAD/Valvular Disease (Mild-Mod MR, Moderate TR)  Clinically Euvolemic  TTE 4/2024: EF 25-30%, grade1 diastolic dysfunction, Mild-moderate MR, Moderate TR  -cardiology consult apprec  -Daily weight. Strict in's and out's, telemetry  -restart bumex  -hold ARNI, mineralocorticoid, SGLT2 due to renal function - may resume as outpatient, renal function dependent  -c/w toprol 25mg qd  -c/w aspirin, plavix  -c/w statin  -holding bumex due to BRODY    #BRODY on CKD stage III 2/2 possible cardio-renal syndrome vs. over-diuresis   Baseline Cr ~1.3-1.5 range  Cr: 1.95>2.11>2.10  -Nephrology Consult apprec  -Avoid nephrotoxic agents  -hold off on ARNI, mineralocorticoid, SGLT2   -Monitor BMP    #Elevated T bili/Alk Phos/mild transaminitis  He has no abdominal pain, possibly due to hepatic congestion  Elevated GGT, elevated direct bili  RUQ US with gallstone  CTAP: Cholelithiasis with  nonspecific gallbladder wall thickening. Correlate   with symptomatology right upper quadrant ultrasound, Small ascites.  CTAP with contrast unable to be performed to do renal function  -GI consult apprec: likely 2/2 hepatic congestion  -Monitor CMP    #Anxiety/Depression/Bipolar Disorder  -Continue prozac, mirtazapine    #BPH  -Continue flomax (takes alternating 0.4mg and 0.8mg)    #History of Lung Cancer  #HARRY Lung Nodule  -s/p RUL lobectomy (9/2022) with recurrent disease s/p radiation to left lung (completed 3/2023)  -Recent PET showed The LEFT lung nodule does appear increasingly rounded solid, and although the degree of uptake there is essentially unchanged. The RIGHT lung appears to have slowly become denser over time, with the appearance of uptake there   are also appearing slightly more prominent.   -Follows with Dr. Alejandre for surveillance    #DVT PPx  -Heparin SC    #FULL CODE    Dispo: Home when optimized  Diet: DASH    Discussed with Dr. Sheehan

## 2024-05-25 VITALS
OXYGEN SATURATION: 96 % | SYSTOLIC BLOOD PRESSURE: 105 MMHG | DIASTOLIC BLOOD PRESSURE: 62 MMHG | RESPIRATION RATE: 17 BRPM | TEMPERATURE: 98 F | HEART RATE: 67 BPM

## 2024-05-25 LAB
ALBUMIN SERPL ELPH-MCNC: 3.3 G/DL — SIGNIFICANT CHANGE UP (ref 3.3–5)
ALP SERPL-CCNC: 311 U/L — HIGH (ref 40–120)
ALT FLD-CCNC: 38 U/L — SIGNIFICANT CHANGE UP (ref 10–45)
ANION GAP SERPL CALC-SCNC: 11 MMOL/L — SIGNIFICANT CHANGE UP (ref 5–17)
AST SERPL-CCNC: 42 U/L — HIGH (ref 10–40)
BILIRUB SERPL-MCNC: 2.3 MG/DL — HIGH (ref 0.2–1.2)
BUN SERPL-MCNC: 63 MG/DL — HIGH (ref 7–23)
CALCIUM SERPL-MCNC: 9 MG/DL — SIGNIFICANT CHANGE UP (ref 8.4–10.5)
CHLORIDE SERPL-SCNC: 99 MMOL/L — SIGNIFICANT CHANGE UP (ref 96–108)
CO2 SERPL-SCNC: 26 MMOL/L — SIGNIFICANT CHANGE UP (ref 22–31)
CREAT SERPL-MCNC: 1.79 MG/DL — HIGH (ref 0.5–1.3)
EGFR: 38 ML/MIN/1.73M2 — LOW
GLUCOSE SERPL-MCNC: 109 MG/DL — HIGH (ref 70–99)
HAV IGM SER-ACNC: SIGNIFICANT CHANGE UP
HBV CORE IGM SER-ACNC: SIGNIFICANT CHANGE UP
HBV SURFACE AG SER-ACNC: SIGNIFICANT CHANGE UP
HCT VFR BLD CALC: 36 % — LOW (ref 39–50)
HCV AB S/CO SERPL IA: 0.2 S/CO — SIGNIFICANT CHANGE UP (ref 0–0.99)
HCV AB SERPL-IMP: SIGNIFICANT CHANGE UP
HGB BLD-MCNC: 11.7 G/DL — LOW (ref 13–17)
MAGNESIUM SERPL-MCNC: 2.4 MG/DL — SIGNIFICANT CHANGE UP (ref 1.6–2.6)
MCHC RBC-ENTMCNC: 31.3 PG — SIGNIFICANT CHANGE UP (ref 27–34)
MCHC RBC-ENTMCNC: 32.5 GM/DL — SIGNIFICANT CHANGE UP (ref 32–36)
MCV RBC AUTO: 96.3 FL — SIGNIFICANT CHANGE UP (ref 80–100)
NRBC # BLD: 0 /100 WBCS — SIGNIFICANT CHANGE UP (ref 0–0)
PHOSPHATE SERPL-MCNC: 4.9 MG/DL — HIGH (ref 2.5–4.5)
PLATELET # BLD AUTO: 148 K/UL — LOW (ref 150–400)
POTASSIUM SERPL-MCNC: 3.7 MMOL/L — SIGNIFICANT CHANGE UP (ref 3.5–5.3)
POTASSIUM SERPL-SCNC: 3.7 MMOL/L — SIGNIFICANT CHANGE UP (ref 3.5–5.3)
PROT SERPL-MCNC: 7.4 G/DL — SIGNIFICANT CHANGE UP (ref 6–8.3)
RBC # BLD: 3.74 M/UL — LOW (ref 4.2–5.8)
RBC # FLD: 17.4 % — HIGH (ref 10.3–14.5)
SODIUM SERPL-SCNC: 136 MMOL/L — SIGNIFICANT CHANGE UP (ref 135–145)
WBC # BLD: 5.35 K/UL — SIGNIFICANT CHANGE UP (ref 3.8–10.5)
WBC # FLD AUTO: 5.35 K/UL — SIGNIFICANT CHANGE UP (ref 3.8–10.5)

## 2024-05-25 PROCEDURE — 99239 HOSP IP/OBS DSCHRG MGMT >30: CPT

## 2024-05-25 PROCEDURE — 86376 MICROSOMAL ANTIBODY EACH: CPT

## 2024-05-25 PROCEDURE — 84484 ASSAY OF TROPONIN QUANT: CPT

## 2024-05-25 PROCEDURE — 83690 ASSAY OF LIPASE: CPT

## 2024-05-25 PROCEDURE — 84466 ASSAY OF TRANSFERRIN: CPT

## 2024-05-25 PROCEDURE — 93005 ELECTROCARDIOGRAM TRACING: CPT

## 2024-05-25 PROCEDURE — 80053 COMPREHEN METABOLIC PANEL: CPT

## 2024-05-25 PROCEDURE — 96374 THER/PROPH/DIAG INJ IV PUSH: CPT

## 2024-05-25 PROCEDURE — 74176 CT ABD & PELVIS W/O CONTRAST: CPT | Mod: MC

## 2024-05-25 PROCEDURE — 76705 ECHO EXAM OF ABDOMEN: CPT

## 2024-05-25 PROCEDURE — 99285 EMERGENCY DEPT VISIT HI MDM: CPT

## 2024-05-25 PROCEDURE — 82390 ASSAY OF CERULOPLASMIN: CPT

## 2024-05-25 PROCEDURE — 84100 ASSAY OF PHOSPHORUS: CPT

## 2024-05-25 PROCEDURE — 71045 X-RAY EXAM CHEST 1 VIEW: CPT

## 2024-05-25 PROCEDURE — 85025 COMPLETE CBC W/AUTO DIFF WBC: CPT

## 2024-05-25 PROCEDURE — 86381 MITOCHONDRIAL ANTIBODY EACH: CPT

## 2024-05-25 PROCEDURE — 82977 ASSAY OF GGT: CPT

## 2024-05-25 PROCEDURE — 83735 ASSAY OF MAGNESIUM: CPT

## 2024-05-25 PROCEDURE — 83880 ASSAY OF NATRIURETIC PEPTIDE: CPT

## 2024-05-25 PROCEDURE — 85027 COMPLETE CBC AUTOMATED: CPT

## 2024-05-25 PROCEDURE — 82985 ASSAY OF GLYCATED PROTEIN: CPT

## 2024-05-25 PROCEDURE — 86038 ANTINUCLEAR ANTIBODIES: CPT

## 2024-05-25 PROCEDURE — 82248 BILIRUBIN DIRECT: CPT

## 2024-05-25 PROCEDURE — 36415 COLL VENOUS BLD VENIPUNCTURE: CPT

## 2024-05-25 PROCEDURE — 82784 ASSAY IGA/IGD/IGG/IGM EACH: CPT

## 2024-05-25 PROCEDURE — 76770 US EXAM ABDO BACK WALL COMP: CPT

## 2024-05-25 PROCEDURE — 83520 IMMUNOASSAY QUANT NOS NONAB: CPT

## 2024-05-25 PROCEDURE — 86255 FLUORESCENT ANTIBODY SCREEN: CPT

## 2024-05-25 PROCEDURE — 99233 SBSQ HOSP IP/OBS HIGH 50: CPT

## 2024-05-25 PROCEDURE — 80074 ACUTE HEPATITIS PANEL: CPT

## 2024-05-25 RX ORDER — METOPROLOL TARTRATE 50 MG
1 TABLET ORAL
Qty: 14 | Refills: 0
Start: 2024-05-25 | End: 2024-06-07

## 2024-05-25 RX ORDER — DAPAGLIFLOZIN 10 MG/1
1 TABLET, FILM COATED ORAL
Refills: 0 | DISCHARGE

## 2024-05-25 RX ORDER — BUMETANIDE 0.25 MG/ML
1 INJECTION INTRAMUSCULAR; INTRAVENOUS
Qty: 14 | Refills: 0
Start: 2024-05-25 | End: 2024-06-07

## 2024-05-25 RX ADMIN — Medication 81 MILLIGRAM(S): at 12:50

## 2024-05-25 RX ADMIN — HEPARIN SODIUM 5000 UNIT(S): 5000 INJECTION INTRAVENOUS; SUBCUTANEOUS at 05:54

## 2024-05-25 RX ADMIN — CLOPIDOGREL BISULFATE 75 MILLIGRAM(S): 75 TABLET, FILM COATED ORAL at 12:51

## 2024-05-25 RX ADMIN — BUMETANIDE 2 MILLIGRAM(S): 0.25 INJECTION INTRAMUSCULAR; INTRAVENOUS at 05:54

## 2024-05-25 RX ADMIN — Medication 25 MILLIGRAM(S): at 05:55

## 2024-05-25 NOTE — PROGRESS NOTE ADULT - ASSESSMENT
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  GI consulted to see patient due to elevated LFTs.

## 2024-05-25 NOTE — PROGRESS NOTE ADULT - ASSESSMENT
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    #Acute Decompensated Heart Failure  #History of Combined Systolic and Diastolic HF with EF 25-30% (Echo 4/2024)  #History of CAD/Valvular Disease (Mild-Mod MR, Moderate TR)  Clinically Euvolemic  TTE 4/2024: EF 25-30%, grade1 diastolic dysfunction, Mild-moderate MR, Moderate TR  -cardiology consult apprec  -Daily weight. Strict in's and out's, telemetry  -restart bumex  -hold ARNI, mineralocorticoid, SGLT2 due to renal function - may resume as outpatient, renal function dependent  -c/w toprol 25mg qd  -c/w aspirin, plavix  -c/w statin  -c/w bumex 2mg qd    #BRODY on CKD stage III 2/2 possible cardio-renal syndrome vs. over-diuresis   Baseline Cr ~1.3-1.5 range  Cr: 1.95>2.11>2.10>1.79  -Nephrology Consult apprec  -Avoid nephrotoxic agents  -hold off on ARNI, mineralocorticoid, SGLT2   -Monitor BMP    #Elevated T bili/Alk Phos/mild transaminitis  He has no abdominal pain, possibly due to hepatic congestion  Elevated GGT, elevated direct bili  RUQ US with gallstone  CTAP: Cholelithiasis with  nonspecific gallbladder wall thickening. Correlate   with symptomatology right upper quadrant ultrasound, Small ascites.  CTAP with contrast unable to be performed to do renal function  -GI consult apprec: likely 2/2 hepatic congestion  -Monitor CMP    #Anxiety/Depression/Bipolar Disorder  -Continue prozac, mirtazapine    #BPH  -Continue flomax (takes alternating 0.4mg and 0.8mg)    #History of Lung Cancer  #HARRY Lung Nodule  -s/p RUL lobectomy (9/2022) with recurrent disease s/p radiation to left lung (completed 3/2023)  -Recent PET showed The LEFT lung nodule does appear increasingly rounded solid, and although the degree of uptake there is essentially unchanged. The RIGHT lung appears to have slowly become denser over time, with the appearance of uptake there are also appearing slightly more prominent.   -Follows with Dr. Alejandre for surveillance    #DVT PPx  -Heparin SC    #FULL CODE    Dispo: Home when optimized  Diet: DASH    Discussed with Dr. Sheehan

## 2024-05-25 NOTE — PROGRESS NOTE ADULT - SUBJECTIVE AND OBJECTIVE BOX
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    Overnight Events: None  Interval History: Patient was seen and examined by me this morning at bedside. Says he feels significantly better this AM    REVIEW OF SYSTEMS:  CONSTITUTIONAL: No weakness, fevers or chills  EYES/ENT: No visual changes;  No vertigo or throat pain   NECK: No pain or stiffness  RESPIRATORY: No cough, wheezing, hemoptysis; No shortness of breath  CARDIOVASCULAR: No chest pain or palpitations  GASTROINTESTINAL: No abdominal or epigastric pain. No nausea, vomiting; No diarrhea or constipation.   GENITOURINARY: No dysuria, frequency or hematuria  NEUROLOGICAL: No numbness or weakness  SKIN: No itching, burning, rashes, or lesions     Vital Signs Last 24 Hrs  T(C): 36.4 (25 May 2024 05:23), Max: 37.2 (24 May 2024 11:20)  T(F): 97.5 (25 May 2024 05:23), Max: 98.9 (24 May 2024 11:20)  HR: 87 (25 May 2024 05:23) (79 - 87)  BP: 105/65 (25 May 2024 05:23) (103/62 - 113/66)  BP(mean): --  RR: 18 (25 May 2024 05:23) (17 - 18)  SpO2: 96% (25 May 2024 05:23) (94% - 97%)    Parameters below as of 25 May 2024 05:23  Patient On (Oxygen Delivery Method): room air    I&O's Summary    24 May 2024 07:01  -  25 May 2024 07:00  --------------------------------------------------------  IN: 200 mL / OUT: 700 mL / NET: -500 mL      PHYSICAL EXAM  Constitutional: Pt lying in bed, awake and alert, NAD, overall appears mildly jaundiced  HEENT: EOMI, normocephalic, moist mucous membranes  Neck: Soft and supple  Respiratory: CTABL, No wheezing, rales or rhonchi  Cardiovascular: S1S2+, no M/G/R  Gastrointestinal: BS+, soft, NT, no guarding, no rebound +distended (-) fluid wave  Extremities: No calf pain, 1+ symmetrical b/l LE edema, bilateral varicosities  Vascular: Peripheral pulses present, b/l LE varicosities  Neurological: AAOx3, no focal deficits  Musculoskeletal: Normal muscle tone, no atrophy, no rigidity, no contractions    MEDICATIONS  (STANDING):  aspirin enteric coated 81 milliGRAM(s) Oral daily  atorvastatin 80 milliGRAM(s) Oral at bedtime  clopidogrel Tablet 75 milliGRAM(s) Oral daily  heparin   Injectable 5000 Unit(s) SubCutaneous every 8 hours  latanoprost 0.005% Ophthalmic Solution 1 Drop(s) Both EYES at bedtime  metoprolol succinate ER 25 milliGRAM(s) Oral daily  mirtazapine 15 milliGRAM(s) Oral at bedtime  polyethylene glycol 3350 17 Gram(s) Oral two times a day  tamsulosin 0.8 milliGRAM(s) Oral <User Schedule>  tamsulosin 0.4 milliGRAM(s) Oral <User Schedule>    MEDICATIONS  (PRN):  acetaminophen     Tablet .. 650 milliGRAM(s) Oral every 6 hours PRN Temp greater or equal to 38C (100.4F), Mild Pain (1 - 3)  aluminum hydroxide/magnesium hydroxide/simethicone Suspension 30 milliLiter(s) Oral every 4 hours PRN Dyspepsia  melatonin 3 milliGRAM(s) Oral at bedtime PRN Insomnia  ondansetron Injectable 4 milliGRAM(s) IV Push every 8 hours PRN Nausea and/or Vomiting      LABS: All Labs Reviewed:                  11.7                 136  | 26   | 63           5.35  >-----------< 148     ------------------------< 109                   36.0                 3.7  | 99   | 1.79                                         Ca 9.0   Mg 2.4   Ph 4.9    Urinalysis Basic - ( 25 May 2024 06:07 )    Color: x / Appearance: x / SG: x / pH: x  Gluc: 109 mg/dL / Ketone: x  / Bili: x / Urobili: x   Blood: x / Protein: x / Nitrite: x   Leuk Esterase: x / RBC: x / WBC x   Sq Epi: x / Non Sq Epi: x / Bacteria: x    RADIOLOGY/EKG (personally reviewed):  < from: 12 Lead ECG (05.22.24 @ 14:25) >  Diagnosis Line Sinus rhythm [id number] degree AV block with frequent premature ventricular complexes  Possible Left atrial enlargement  Left axis deviation  Anteroseptal infarct (cited on or before 11-MAY-2022)  Abnormal ECG  When compared with ECG of 19-APR-2024 14:39,  Premature ventricular complexes are now present  Confirmed by Luis Carlos Gilliam ([id number]) on 5/22/2024 3:52:42 PM    < end of copied text >  < from: Xray Chest 1 View- PORTABLE-Urgent (05.22.24 @ 15:09) >  IMPRESSION: Small right base effusion new since prior. Other findings   stable.    < end of copied text >    < from: CT Abdomen and Pelvis No Cont (05.24.24 @ 08:58) >  Cholelithiasis with  nonspecific gallbladder wall thickening. Correlate   with symptomatology right upper quadrant ultrasound    Small ascites.    Additional findings as discussed    < end of copied text >  < from: US Kidney and Bladder (05.23.24 @ 12:53) >  IMPRESSION:  Enlarged prostate and a possible chronic bladder outlet obstruction with   43 cc of post void residual.  No evidence of hydronephrosis.    < end of copied text >  < from: US Abdomen Upper Quadrant Right (05.23.24 @ 11:25) >  Gallstones, gallbladder mural thickening without palpable tenderness, not   significantly changed from 5/5/2024 , when allowing for differences in   modality. . Clinical correlation may determine need for hepatobiliary   scintigraphy    < end of copied text >

## 2024-05-25 NOTE — PROGRESS NOTE ADULT - SUBJECTIVE AND OBJECTIVE BOX
GI Follow Up    Patient seen and examined at the bedside. In good spirits. Denies abdominal pain, n/v/f/c, pruritus.      MEDICATIONS  (STANDING):  aspirin enteric coated 81 milliGRAM(s) Oral daily  atorvastatin 80 milliGRAM(s) Oral at bedtime  buMETAnide 2 milliGRAM(s) Oral daily  clopidogrel Tablet 75 milliGRAM(s) Oral daily  heparin   Injectable 5000 Unit(s) SubCutaneous every 8 hours  latanoprost 0.005% Ophthalmic Solution 1 Drop(s) Both EYES at bedtime  metoprolol succinate ER 25 milliGRAM(s) Oral daily  mirtazapine 15 milliGRAM(s) Oral at bedtime  polyethylene glycol 3350 17 Gram(s) Oral two times a day  tamsulosin 0.8 milliGRAM(s) Oral at bedtime    MEDICATIONS  (PRN):  acetaminophen     Tablet .. 650 milliGRAM(s) Oral every 6 hours PRN Temp greater or equal to 38C (100.4F), Mild Pain (1 - 3)  aluminum hydroxide/magnesium hydroxide/simethicone Suspension 30 milliLiter(s) Oral every 4 hours PRN Dyspepsia  ondansetron Injectable 4 milliGRAM(s) IV Push every 8 hours PRN Nausea and/or Vomiting  temazepam 7.5 milliGRAM(s) Oral at bedtime PRN Insomnia      Diet, Regular:   DASH/TLC Sodium & Cholesterol Restricted (05-22-24 @ 16:38) [Active]          PHYSICAL EXAM:   Vital Signs:  Vital Signs Last 24 Hrs  T(C): 36.4 (25 May 2024 05:23), Max: 37.2 (24 May 2024 11:20)  T(F): 97.5 (25 May 2024 05:23), Max: 98.9 (24 May 2024 11:20)  HR: 87 (25 May 2024 05:23) (79 - 87)  BP: 105/65 (25 May 2024 05:23) (103/62 - 113/66)  BP(mean): --  RR: 18 (25 May 2024 05:23) (17 - 18)  SpO2: 96% (25 May 2024 05:23) (94% - 97%)    Parameters below as of 25 May 2024 05:23  Patient On (Oxygen Delivery Method): room air      Daily     Daily I&O's Summary    24 May 2024 07:01  -  25 May 2024 07:00  --------------------------------------------------------  IN: 200 mL / OUT: 700 mL / NET: -500 mL        GENERAL:   NAD  HEENT:  NC/AT,  anicteric sclera  CHEST:  Clear B/L  HEART:  RRR  ABDOMEN:  Soft, non-tender, non-distended, no masses palpable  EXTR:  no edema  SKIN:  No rash or jaundice  NEURO:  Awake, alert, no confusion, tremor, asterixis    LABS:                        11.7   5.35  )-----------( 148      ( 25 May 2024 06:07 )             36.0       05-25    136  |  99  |  63<H>  ----------------------------<  109<H>  3.7   |  26  |  1.79<H>    Ca    9.0      25 May 2024 06:07  Phos  4.9     05-25  Mg     2.4     05-25    TPro  7.4  /  Alb  3.3  /  TBili  2.3<H>  /  DBili  x   /  AST  42<H>  /  ALT  38  /  AlkPhos  311<H>  05-25    Ceruloplasmin, Serum: 43 mg/dL (05.24.24 @ 13:55)   Transferrin, Serum: 338 mg/dL (05.24.24 @ 13:55)   Hepatitis A IgM Antibody: Nonreact (05.24.24 @ 13:55)   Hepatitis B Surface Antigen: Nonreact (05.24.24 @ 13:55)   Hepatitis C Virus Interpretation: Nonreact:   Gamma Glutamyl Transferase, Serum: 422 U/L (05.23.24 @ 10:42)     Bilirubin Direct: 0.8 mg/dL (05.23.24 @ 06:37)

## 2024-05-25 NOTE — PROGRESS NOTE ADULT - ATTENDING COMMENTS
Please see resident note for full details regarding the service.     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% (Echo 4/2024), valvular disease (mild-moderate MR, moderate TR), CKD, anxiety/depression/bipolar disorder, history of lung cancer s/p RUL lobectomy (9/2022) with recurrent disease s/p radiation to left lung (completed 3/2023)    Plan:   - c/w PO Bumex 2 mg PO daily   - c/w Metoprolol succinate XL that pt takes at home for GDMT   - Spoke to cardiology: good candidate for CardioMEMS, may need AICD if EF low on GDMT -> can f/u with CHF team at [facility]   - Nephrology recommends holding Farxiga, Spironolactone, Entresto at this time d/t BRODY on CKD -> can f/u outpatient with nephrology and cardiology outpatient as to when to restart   - GI recs appreciated - f/u liver tests ordered outpatient, CT without biliary duct compression/obstruction -> elevated LFTs secondary to CHF   - Surgery recs appreciated - f/u with surgery outpatient   - Urology f/u outpatient prostate for renal US findings   - Dispo: c/w PO Bumex, holding Farxiga, Spironolactone, Entresto per nephrology -> d/c home today   - *Needs close follow up outpatient with CHF specialist*     I spent a total of 50 minutes on the date of this encounter coordinating the patient's care, excluding resident teaching time. This includes reviewing results/imaging and discussions with specialists, nursing, case management/social work. Further tests, medications, and procedures have been ordered as indicated. Results and the plan of care were communicated to the patient and/or their family member. Supporting documentation was completed and added to the patient's chart.
Please see resident note for full details regarding the service.     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% (Echo 4/2024), valvular disease (mild-moderate MR, moderate TR), CKD, anxiety/depression/bipolar disorder, history of lung cancer s/p RUL lobectomy (9/2022) with recurrent disease s/p radiation to left lung (completed 3/2023)    Plan:   - c/w PO Bumex 2 mg PO daily   - c/w Metoprolol succinate XL that pt takes at home for GDMT   - Spoke to cardiology: good candidate for CardioMEMS, may need AICD if EF low on GDMT -> can f/u with CHF team at [facility]   - Holding Farxiga, Spironolactone, Entresto d/t BRODY on CKD -> will discuss further with nephrology given CrCl 33.3   - Daily weights, strict I+Os   - ECHO (4/2024); EF 25-30%, mild MR, moderate TR   - Avoid nephrotoxic medications   - Ordered CT abd/pelvis giving rising total bilirubin: Cholelithiasis with nonspecific gallbladder wall thickening. Correlate with symptomatology right upper quadrant ultrasound. Small ascites. Adrenal adenoma   - GI consulted given unclear etiology of hyperbilirubinemia (may need further endoscopic workup)   - Urology f/u prostate for renal US findings   - Dispo: Active, c/w PO Bumex, will discuss additional GDMT with nephrology     I spent a total of 50 minutes on the date of this encounter coordinating the patient's care, excluding resident teaching time. This includes reviewing results/imaging and discussions with specialists, nursing, case management/social work. Further tests, medications, and procedures have been ordered as indicated. Results and the plan of care were communicated to the patient and/or their family member. Supporting documentation was completed and added to the patient's chart.
Please see resident note for full details regarding the service.     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% (Echo 4/2024), valvular disease (mild-moderate MR, moderate TR), CKD, anxiety/depression/bipolar disorder, history of lung cancer s/p RUL lobectomy (9/2022) with recurrent disease s/p radiation to left lung (completed 3/2023)    Plan:   - c/w Bumex 1 mg IVP BID -> transition to PO today   - Restart metoprolol succinate XL that pt takes at home for GDMT   - Spoke to cardiology: good candidate for CardioMEMS, may need AICD if EF low on GDMT -> can f[facility] with [facility] team at [facility]   - Holding Farxiga, Spironolactone, Entresto d/t BRODY on CKD -> will discuss further with nephrology   - Daily weights, strict I+Os   - ECHO (4/2024); EF 25-30%, mild MR, moderate TR   - Avoid nephrotoxic medications   - Renal ultrasound: Enlarged prostate and a possible chronic bladder outlet obstruction with 43 cc of post void residual. No evidence of hydronephrosis.  - RUQ US: Gallstones, gallbladder mural thickening without palpable tenderness, not significantly changed from 5/5/2024 , when allowing for differences in modality. Clinical correlation may determine need for hepatobiliary scintigraphy  - Pt without abdominal tenderness on exam, negative Velasco's sign -> if uptrending LFTs can consider HIDA but will defer at this time   - Urology f/u prostate for renal US findings   - Nephrology consult  - Ambulates well per nursing -> no PT evaluation   - Dispo: Active, transition IV to PO Bumex, nephrology consult pending -> d/c home in 24-48 hours     I spent a total of 50 minutes on the date of this encounter coordinating the patient's care, excluding resident teaching time. This includes reviewing results/imaging and discussions with specialists, nursing, case management/social work. Further tests, medications, and procedures have been ordered as indicated. Results and the plan of care were communicated to the patient and/or their family member. Supporting documentation was completed and added to the patient's chart.

## 2024-05-25 NOTE — PROGRESS NOTE ADULT - PROBLEM SELECTOR PLAN 1
Likely due to exacerbation of CHF.   Monitor liver tests daily while inpatient. Noted mildly elevated bilirubin and alk phos which appear stable. No evidence of active liver disease on blood work (elevated ceruloplasmin and transferrin=acute phase reactants). F/u remaining liver tests but this could be done as outpatient.  Abd CT and Abd US reviewed- no biliary duct compression or obstruction noted  Avoid hepatotoxic meds  If he develops RUQ pain consider surgical consult but findings on imaging may also be reflective of underlying CHF causing GB wall edema rather than true cholecystitis.

## 2024-05-25 NOTE — PROGRESS NOTE ADULT - REASON FOR ADMISSION
Weight Gain, OGLESBY

## 2024-05-26 ENCOUNTER — TRANSCRIPTION ENCOUNTER (OUTPATIENT)
Age: 79
End: 2024-05-26

## 2024-05-26 ENCOUNTER — INPATIENT (INPATIENT)
Facility: HOSPITAL | Age: 79
LOS: 3 days | Discharge: HOME | DRG: 316 | End: 2024-05-30
Attending: STUDENT IN AN ORGANIZED HEALTH CARE EDUCATION/TRAINING PROGRAM | Admitting: STUDENT IN AN ORGANIZED HEALTH CARE EDUCATION/TRAINING PROGRAM
Payer: MEDICARE

## 2024-05-26 VITALS
HEIGHT: 72 IN | WEIGHT: 179.9 LBS | DIASTOLIC BLOOD PRESSURE: 66 MMHG | OXYGEN SATURATION: 99 % | SYSTOLIC BLOOD PRESSURE: 96 MMHG | TEMPERATURE: 98 F | RESPIRATION RATE: 18 BRPM | HEART RATE: 65 BPM

## 2024-05-26 DIAGNOSIS — Z98.890 OTHER SPECIFIED POSTPROCEDURAL STATES: Chronic | ICD-10-CM

## 2024-05-26 LAB
ALBUMIN SERPL ELPH-MCNC: 3.3 G/DL — SIGNIFICANT CHANGE UP (ref 3.3–5)
ALP SERPL-CCNC: 317 U/L — HIGH (ref 40–120)
ALT FLD-CCNC: 38 U/L — SIGNIFICANT CHANGE UP (ref 10–45)
ANION GAP SERPL CALC-SCNC: 16 MMOL/L — SIGNIFICANT CHANGE UP (ref 5–17)
AST SERPL-CCNC: 76 U/L — HIGH (ref 10–40)
BASOPHILS # BLD AUTO: 0.07 K/UL — SIGNIFICANT CHANGE UP (ref 0–0.2)
BASOPHILS NFR BLD AUTO: 0.9 % — SIGNIFICANT CHANGE UP (ref 0–2)
BILIRUB SERPL-MCNC: 3.4 MG/DL — HIGH (ref 0.2–1.2)
BUN SERPL-MCNC: 68 MG/DL — HIGH (ref 7–23)
CALCIUM SERPL-MCNC: 8.8 MG/DL — SIGNIFICANT CHANGE UP (ref 8.4–10.5)
CHLORIDE SERPL-SCNC: 98 MMOL/L — SIGNIFICANT CHANGE UP (ref 96–108)
CO2 SERPL-SCNC: 21 MMOL/L — LOW (ref 22–31)
CREAT SERPL-MCNC: 2.05 MG/DL — HIGH (ref 0.5–1.3)
EGFR: 32 ML/MIN/1.73M2 — LOW
EOSINOPHIL # BLD AUTO: 0.08 K/UL — SIGNIFICANT CHANGE UP (ref 0–0.5)
EOSINOPHIL NFR BLD AUTO: 1 % — SIGNIFICANT CHANGE UP (ref 0–6)
FLUAV AG NPH QL: SIGNIFICANT CHANGE UP
FLUBV AG NPH QL: SIGNIFICANT CHANGE UP
GLUCOSE SERPL-MCNC: 112 MG/DL — HIGH (ref 70–99)
HCT VFR BLD CALC: 38.2 % — LOW (ref 39–50)
HGB BLD-MCNC: 12.6 G/DL — LOW (ref 13–17)
IMM GRANULOCYTES NFR BLD AUTO: 0.4 % — SIGNIFICANT CHANGE UP (ref 0–0.9)
LYMPHOCYTES # BLD AUTO: 1.1 K/UL — SIGNIFICANT CHANGE UP (ref 1–3.3)
LYMPHOCYTES # BLD AUTO: 13.6 % — SIGNIFICANT CHANGE UP (ref 13–44)
MCHC RBC-ENTMCNC: 31.7 PG — SIGNIFICANT CHANGE UP (ref 27–34)
MCHC RBC-ENTMCNC: 33 GM/DL — SIGNIFICANT CHANGE UP (ref 32–36)
MCV RBC AUTO: 96 FL — SIGNIFICANT CHANGE UP (ref 80–100)
MONOCYTES # BLD AUTO: 1.34 K/UL — HIGH (ref 0–0.9)
MONOCYTES NFR BLD AUTO: 16.6 % — HIGH (ref 2–14)
NEUTROPHILS # BLD AUTO: 5.47 K/UL — SIGNIFICANT CHANGE UP (ref 1.8–7.4)
NEUTROPHILS NFR BLD AUTO: 67.5 % — SIGNIFICANT CHANGE UP (ref 43–77)
NRBC # BLD: 0 /100 WBCS — SIGNIFICANT CHANGE UP (ref 0–0)
NT-PROBNP SERPL-SCNC: 5050 PG/ML — HIGH (ref 0–300)
PLATELET # BLD AUTO: 173 K/UL — SIGNIFICANT CHANGE UP (ref 150–400)
POTASSIUM SERPL-MCNC: 5.2 MMOL/L — SIGNIFICANT CHANGE UP (ref 3.5–5.3)
POTASSIUM SERPL-SCNC: 5.2 MMOL/L — SIGNIFICANT CHANGE UP (ref 3.5–5.3)
PROT SERPL-MCNC: 7.6 G/DL — SIGNIFICANT CHANGE UP (ref 6–8.3)
RBC # BLD: 3.98 M/UL — LOW (ref 4.2–5.8)
RBC # FLD: 17.5 % — HIGH (ref 10.3–14.5)
RSV RNA NPH QL NAA+NON-PROBE: SIGNIFICANT CHANGE UP
SARS-COV-2 RNA SPEC QL NAA+PROBE: SIGNIFICANT CHANGE UP
SODIUM SERPL-SCNC: 135 MMOL/L — SIGNIFICANT CHANGE UP (ref 135–145)
TROPONIN I, HIGH SENSITIVITY RESULT: 63.8 NG/L — SIGNIFICANT CHANGE UP
TROPONIN I, HIGH SENSITIVITY RESULT: 66.3 NG/L — SIGNIFICANT CHANGE UP
WBC # BLD: 8.09 K/UL — SIGNIFICANT CHANGE UP (ref 3.8–10.5)
WBC # FLD AUTO: 8.09 K/UL — SIGNIFICANT CHANGE UP (ref 3.8–10.5)

## 2024-05-26 PROCEDURE — 99285 EMERGENCY DEPT VISIT HI MDM: CPT

## 2024-05-26 RX ORDER — BUMETANIDE 0.25 MG/ML
2 INJECTION INTRAMUSCULAR; INTRAVENOUS DAILY
Refills: 0 | Status: DISCONTINUED | OUTPATIENT
Start: 2024-05-26 | End: 2024-05-26

## 2024-05-26 RX ORDER — ATORVASTATIN CALCIUM 80 MG/1
80 TABLET, FILM COATED ORAL AT BEDTIME
Refills: 0 | Status: DISCONTINUED | OUTPATIENT
Start: 2024-05-26 | End: 2024-05-30

## 2024-05-26 RX ORDER — LANOLIN ALCOHOL/MO/W.PET/CERES
3 CREAM (GRAM) TOPICAL AT BEDTIME
Refills: 0 | Status: DISCONTINUED | OUTPATIENT
Start: 2024-05-26 | End: 2024-05-30

## 2024-05-26 RX ORDER — LATANOPROST 0.05 MG/ML
1 SOLUTION/ DROPS OPHTHALMIC; TOPICAL AT BEDTIME
Refills: 0 | Status: DISCONTINUED | OUTPATIENT
Start: 2024-05-26 | End: 2024-05-30

## 2024-05-26 RX ORDER — TAMSULOSIN HYDROCHLORIDE 0.4 MG/1
0.8 CAPSULE ORAL
Refills: 0 | Status: DISCONTINUED | OUTPATIENT
Start: 2024-05-27 | End: 2024-05-27

## 2024-05-26 RX ORDER — TEMAZEPAM 15 MG/1
15 CAPSULE ORAL AT BEDTIME
Refills: 0 | Status: DISCONTINUED | OUTPATIENT
Start: 2024-05-26 | End: 2024-05-30

## 2024-05-26 RX ORDER — MIRTAZAPINE 45 MG/1
15 TABLET, ORALLY DISINTEGRATING ORAL AT BEDTIME
Refills: 0 | Status: DISCONTINUED | OUTPATIENT
Start: 2024-05-26 | End: 2024-05-30

## 2024-05-26 RX ORDER — BUMETANIDE 0.25 MG/ML
1 INJECTION INTRAMUSCULAR; INTRAVENOUS
Refills: 0 | Status: DISCONTINUED | OUTPATIENT
Start: 2024-05-26 | End: 2024-05-28

## 2024-05-26 RX ORDER — ASPIRIN/CALCIUM CARB/MAGNESIUM 324 MG
81 TABLET ORAL DAILY
Refills: 0 | Status: DISCONTINUED | OUTPATIENT
Start: 2024-05-26 | End: 2024-05-30

## 2024-05-26 RX ORDER — ACETAMINOPHEN 500 MG
650 TABLET ORAL EVERY 6 HOURS
Refills: 0 | Status: DISCONTINUED | OUTPATIENT
Start: 2024-05-26 | End: 2024-05-30

## 2024-05-26 RX ORDER — CLOPIDOGREL BISULFATE 75 MG/1
75 TABLET, FILM COATED ORAL DAILY
Refills: 0 | Status: DISCONTINUED | OUTPATIENT
Start: 2024-05-26 | End: 2024-05-30

## 2024-05-26 RX ORDER — FLUOXETINE HCL 10 MG
20 CAPSULE ORAL
Refills: 0 | Status: DISCONTINUED | OUTPATIENT
Start: 2024-05-26 | End: 2024-05-30

## 2024-05-26 RX ORDER — TAMSULOSIN HYDROCHLORIDE 0.4 MG/1
0.4 CAPSULE ORAL
Refills: 0 | Status: DISCONTINUED | OUTPATIENT
Start: 2024-05-26 | End: 2024-05-27

## 2024-05-26 RX ORDER — BUMETANIDE 0.25 MG/ML
0.5 INJECTION INTRAMUSCULAR; INTRAVENOUS ONCE
Refills: 0 | Status: COMPLETED | OUTPATIENT
Start: 2024-05-26 | End: 2024-05-26

## 2024-05-26 RX ORDER — ONDANSETRON 8 MG/1
4 TABLET, FILM COATED ORAL EVERY 8 HOURS
Refills: 0 | Status: DISCONTINUED | OUTPATIENT
Start: 2024-05-26 | End: 2024-05-30

## 2024-05-26 RX ORDER — MIDODRINE HYDROCHLORIDE 2.5 MG/1
5 TABLET ORAL THREE TIMES A DAY
Refills: 0 | Status: DISCONTINUED | OUTPATIENT
Start: 2024-05-26 | End: 2024-05-27

## 2024-05-26 RX ADMIN — ATORVASTATIN CALCIUM 80 MILLIGRAM(S): 80 TABLET, FILM COATED ORAL at 22:12

## 2024-05-26 RX ADMIN — MIRTAZAPINE 15 MILLIGRAM(S): 45 TABLET, ORALLY DISINTEGRATING ORAL at 22:12

## 2024-05-26 RX ADMIN — TEMAZEPAM 15 MILLIGRAM(S): 15 CAPSULE ORAL at 22:13

## 2024-05-26 RX ADMIN — BUMETANIDE 0.5 MILLIGRAM(S): 0.25 INJECTION INTRAMUSCULAR; INTRAVENOUS at 14:49

## 2024-05-26 RX ADMIN — LATANOPROST 1 DROP(S): 0.05 SOLUTION/ DROPS OPHTHALMIC; TOPICAL at 22:13

## 2024-05-26 RX ADMIN — TAMSULOSIN HYDROCHLORIDE 0.4 MILLIGRAM(S): 0.4 CAPSULE ORAL at 22:13

## 2024-05-26 NOTE — H&P ADULT - HISTORY OF PRESENT ILLNESS
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 Pt notes that after coming home yesterday form [facility], pt experienced worsening dyspnea associated with some weakness and leg swelling, causing him to BIBA to [facility]. In the ED, pt was found to be hypoxic and   hypotensive.  Labs were notable for h&h 12.6/38.2, BUN/Cr 68/2.05, Alkphos 317, AST 76, BNP 5050. CXR showed cardiomegaly and peripheral vascular congestion. Pt was give fluids, one dose IV bumex. 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   Pt accompanied by his wife. Pt notes that after coming home yesterday from [facility], pt experienced tiredness, nausea and dry heaving at night. Pt endorses taking Bumex, metoprolol, plavix and ASA all together late this morning and started feeling dizziness, and worsening dyspnea associated with some weakness, causing him to BIBA to [facility].     In the ED, pt was found to be hypoxic sat 90% on RA and hypotensive.  Labs were notable for h&h 12.6/38.2, BUN/Cr 68/2.05, Alkphos 317, AST 76, BNP 5050. CXR showed cardiomegaly and peripheral vascular congestion. Pt was given fluids, one dose IV bumex.. RVP negative. 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  Pt accompanied by his wife. Pt notes that after coming home yesterday from [facility], pt experienced tiredness, nausea and dry heaving at night. Pt endorses taking Bumex, metoprolol, plavix and ASA all together late this morning and started feeling dizziness, and worsening dyspnea associated with some weakness, causing him to BIBA to [facility]. In the ED, pt was found to be hypoxic sat 90% on RA and hypotensive.  Labs were notable for h&h 12.6/38.2, BUN/Cr 68/2.05, Alkphos 317, AST 76, BNP 5050. CXR showed cardiomegaly and peripheral vascular congestion. Pt was given fluids, one dose IV bumex.. RVP negative.

## 2024-05-26 NOTE — ED ADULT NURSE NOTE - OBJECTIVE STATEMENT
Pt a&ox4 BIB EMS from home c/o dyspnea since yesterday. Pt with h/o CHF. He was recently hospitalized. Denies CP, dizziness, fevers/chills.

## 2024-05-26 NOTE — H&P ADULT - ATTENDING COMMENTS
78yo male with hx of CAD, CHF w/ reduced EF, valvular disease, presented back to [facility] w/ complaints and symptoms of SOB as well as dizziness. Pt was recently discharged from the hospital due to acute decompensated CHF.     Vitals reviewed  Physical exam remarkable for jaundice appearing elderly male. Minimal rales b/l on auscultation.   Blood work reviewed  CXR reviewed; In comparison to most recent CXR on 5/22, there is an improvement in not only pleural effusion but also venous congestion    Pt w/ complaints of sob, dizziness w/ recent discharged for decompensated CHF.  This does not appear to be worsening CHF exacerbation. Symptoms are likely secondary to baseline hypotension in addition to taking Bumex + Metoprolol together in the AM.  Pt also to have elevated transaminitis + Bili levels. Prior hospital course w/ GI evaluation. This is likely secondary to hepatic congestion from CHF.   Pt will benefit from either taking Metoprolol in the morning and Bumex in the afternoon (or vice versa) or taking Bumex 1mg BID instead of 2mg daily.   If continues to be hypotensive, he may benefit from Midodrine as well  Unfortunately due to renal function as well as BP, unable to start pt on Aldactone or other GDMT  In any case, pt w/ poor prognosis and will need to be assessed/evaluated by AICD    Pt w/ poor prognosis. He is unfortunately progressing to multiorgan failure w/ CKD IV and Hepatic congestion which will ultimately lead to portal HTN, Ascites, varices and ultimately liver cirrhosis. Family and pt prefer FULL CODE status  Palliative consult for further GOC conversations

## 2024-05-26 NOTE — H&P ADULT - NSHPPHYSICALEXAM_GEN_ALL_CORE
Vital Signs Last 24 Hrs  T(C): 36.4 (26 May 2024 14:00), Max: 36.4 (26 May 2024 14:00)  T(F): 97.5 (26 May 2024 14:00), Max: 97.5 (26 May 2024 14:00)  HR: 68 (26 May 2024 16:00) (65 - 68)  BP: 95/65 (26 May 2024 16:00) (95/65 - 96/66)  BP(mean): 75 (26 May 2024 16:00) (75 - 75)  RR: 16 (26 May 2024 16:00) (16 - 18)  SpO2: 99% (26 May 2024 16:00) (99% - 99%)    Parameters below as of 26 May 2024 16:00  Patient On (Oxygen Delivery Method): room air    PHYSICAL EXAM  Constitutional: Pt lying in bed, awake and alert, NAD, overall appears mildly jaundiced  HEENT: EOMI, normocephalic, moist mucous membranes  Neck: Soft and supple  Respiratory: CTABL, slight b/l crackles  Cardiovascular: S1S2+, no M/G/R  Gastrointestinal: BS+, soft, NT, no guarding, no rebound +distended (-) fluid wave  Extremities: No calf pain, 1+ symmetrical b/l LE edema, bilateral varicosities  Vascular: Peripheral pulses present, b/l LE varicosities  Neurological: AAOx3, no focal deficits  Musculoskeletal: Normal muscle tone, no atrophy, no rigidity, no contractions Vital Signs Last 24 Hrs  T(C): 36.4 (26 May 2024 14:00), Max: 36.4 (26 May 2024 14:00)  T(F): 97.5 (26 May 2024 14:00), Max: 97.5 (26 May 2024 14:00)  HR: 68 (26 May 2024 16:00) (65 - 68)  BP: 95/65 (26 May 2024 16:00) (95/65 - 96/66)  BP(mean): 75 (26 May 2024 16:00) (75 - 75)  RR: 16 (26 May 2024 16:00) (16 - 18)  SpO2: 99% (26 May 2024 16:00) (99% - 99%)    Parameters below as of 26 May 2024 16:00  Patient On (Oxygen Delivery Method): room air    PHYSICAL EXAM  Constitutional: Pt lying in bed, awake and alert, NAD, overall appears mildly jaundiced  HEENT: EOMI, normocephalic, moist mucous membranes, +scleral icterus  Neck: Soft and supple  Respiratory: normal respiratory effort, slight b/l crackles, no wheezing  Cardiovascular: S1S2+, no M/G/R  Gastrointestinal: BS+, soft, NT, no guarding, no rebound +distended (-) fluid wave  Extremities: No calf pain, trace BLE nonpitting edema, bilateral varicosities  Vascular: Peripheral pulses present, b/l LE varicosities  Neurological: AAOx3, no focal deficits  Musculoskeletal: Normal muscle tone, no atrophy, no rigidity, no contractions Vital Signs Last 24 Hrs  T(C): 36.4 (26 May 2024 14:00), Max: 36.4 (26 May 2024 14:00)  T(F): 97.5 (26 May 2024 14:00), Max: 97.5 (26 May 2024 14:00)  HR: 68 (26 May 2024 16:00) (65 - 68)  BP: 95/65 (26 May 2024 16:00) (95/65 - 96/66)  BP(mean): 75 (26 May 2024 16:00) (75 - 75)  RR: 16 (26 May 2024 16:00) (16 - 18)  SpO2: 99% (26 May 2024 16:00) (99% - 99%)    Parameters below as of 26 May 2024 16:00  Patient On (Oxygen Delivery Method): room air    PHYSICAL EXAM  Constitutional: Pt lying in bed, awake and alert, NAD, overall appears mildly jaundiced  HEENT: EOMI, normocephalic, moist mucous membranes, +scleral icterus  Neck: Soft and supple  Respiratory: normal respiratory effort, slight b/l crackles, no wheezing  Cardiovascular: S1S2+, no M/G/R  Gastrointestinal: BS+, soft, NT, no guarding, no rebound +distended, neg fluid wave  Extremities: No calf pain, trace BLE nonpitting edema, bilateral varicosities  Vascular: Peripheral pulses present, b/l LE varicosities  Neurological: AAOx3, no focal deficits  Musculoskeletal: Normal muscle tone, no atrophy, no rigidity, no contractions

## 2024-05-26 NOTE — ED PROVIDER NOTE - PHYSICAL EXAMINATION
General:    Patient short of breath oxygen saturation 100% with 2 L nasal cannula  Head:     NC/AT, EOMI, oral mucosa moist  HEENT– EOMI pupils equal round react light positive icterus  Neck:     trachea midline  Lungs:     Bilateral basal crackles  CVS:     S1S2, RRR, no m/g/r  Abd:     +BS, s/nt/Distended abdomen positive gas no organomegaly  Ext:    2+ radial and pedal pulses, +1 pedal edema  Neuro: AAOx3, no sensory/motor deficits

## 2024-05-26 NOTE — H&P ADULT - NSHPREVIEWOFSYSTEMS_GEN_ALL_CORE
REVIEW OF SYSTEMS:    CONSTITUTIONAL: +dizziness, No fevers or chills  EYES/ENT: No visual changes;  No vertigo or throat pain   NECK: No pain or stiffness  RESPIRATORY: No cough, wheezing, or hemoptysis; + shortness of breath  CARDIOVASCULAR: No chest pain or palpitations  GASTROINTESTINAL: +distension of abdomen, +nausea, No abdominal or epigastric pain. No vomiting, or hematemesis; No diarrhea or constipation. No melena or hematochezia.  GENITOURINARY: No dysuria, frequency or hematuria  NEUROLOGICAL: No numbness or weakness  SKIN: No itching, or rashes

## 2024-05-26 NOTE — ED ADULT TRIAGE NOTE - CHIEF COMPLAINT QUOTE
Patient brought in by EMS from home with complaint of dyspnea and weakness since yesterday. Patient pulse ox 90% on RA, placed on O2 6L via nasal cannula, pulse ox 99%. Patient was hypotensive 85/55, Patient given IV fluids by EMS.

## 2024-05-26 NOTE — H&P ADULT - ASSESSMENT
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     #Acute Decompensated Heart Failure  #History of Combined Systolic and Diastolic HF with EF 25-30% (Echo 4/2024)  #History of CAD/Valvular Disease (Mild-Mod MR, Moderate TR)  Clinically Euvolemic  TTE 4/2024: EF 25-30%, grade1 diastolic dysfunction, Mild-moderate MR, Moderate TR  -cardiology consult apprec  -Daily weight. 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 Correlate   with symptomatology right upper quadrant ultrasound, Small ascites. CTAP with contrast unable to be performed to do renal function  -Monitor CMP  -Consider GI consult    #Anxiety/Depression/Bipolar Disorder  -Continue prozac, mirtazapine    #BPH  -Continue flomax (takes alternating 0.4mg and 0.8mg)    #History of Lung Cancer  #HARRY Lung Nodule  -s/p RUL lobectomy (9/2022) with recurrent disease s/p radiation to left lung (completed 3/2023)  -Recent PET showed The LEFT lung nodule does appear increasingly rounded solid, and although the degree of uptake there is essentially unchanged. The RIGHT lung appears to have slowly become denser over time, with the appearance of uptake there are also appearing slightly more prominent.   -Follows with Dr. Alejandre for surveillance    #DVT PPx  -Heparin SC    #FULL CODE    D/w Dr. 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       #History of Combined Systolic and Diastolic HF with EF 25-30% (Echo 4/2024)  #History of CAD/Valvular Disease (Mild-Mod MR, Moderate TR)  Clinically Euvolemic  TTE 4/2024: EF 25-30%, grade1 diastolic dysfunction, Mild-moderate MR, Moderate TR  -cardiology consult apprec  -Daily weight. 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 Correlate   with symptomatology right upper quadrant ultrasound, Small ascites. CTAP with contrast unable to be performed to do renal function  -bilirubin 3.4 with scleral icterus and jaundiced skin  -Monitor CMP  -Consider GI consult    #Anxiety/Depression/Bipolar Disorder  -Continue prozac, mirtazapine    #BPH  -Continue flomax (takes alternating 0.4mg and 0.8mg)    #History of Lung Cancer  #HARRY Lung Nodule  -s/p RUL lobectomy (9/2022) with recurrent disease s/p radiation to left lung (completed 3/2023)  -Recent PET showed The LEFT lung nodule does appear increasingly rounded solid, and although the degree of uptake there is essentially unchanged. The RIGHT lung appears to have slowly become denser over time, with the appearance of uptake there are also appearing slightly more prominent.   -Follows with Dr. Alejandre for surveillance  -consult Palliative    #DVT PPx  -Heparin SC    #FULL CODE        D/w Dr. 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     #Acute Hypoxic Respiratory Failure 2/2 Acute Decompensated HF  #History of Combined Systolic and Diastolic HF with EF 25-30% (Echo 4/2024)  #History of CAD/Valvular Disease (Mild-Mod MR, Moderate TR)  -Pt with shortness of breast, dizziness, weakness on admission  -TTE 4/2024: EF 25-30%, grade1 diastolic dysfunction, Mild-moderate MR, Moderate TR  - Daily weight. Strict in's and out's, telemetry  -S/p IV bumex in the ED; C/w bumex at home dose to avoid overdiuresis  -hold ARNI, mineralocorticoid, SGLT2 due to renal function  -hold toprol 25mg qd d/t hypotension  -c/w aspirin, plavix  -c/w statin  -cardio consult    #Worsening jaundice 2/2 hepatic congestion  #Elevated T bili/Alk Phos/mild transaminitis  -He has no abdominal pain, possibly due to hepatic congestion  -On last admission, Elevated GGT, elevated direct bili; RUQ US with gallstone; CTAP: Cholelithiasis with  nonspecific gallbladder wall thickening. Correlate   with symptomatology right upper quadrant ultrasound, Small ascites. 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 The RIGHT lung appears to have slowly become denser over time, with the appearance of uptake there are also appearing slightly more prominent.   -Follows with Dr. Alejandre for surveillance  -consult Palliative    #DVT PPx  -Heparin SC    #FULL CODE        D/w Dr. 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     #Acute Hypoxic Respiratory Failure 2/2 Acute Decompensated HF  #History of Combined Systolic and Diastolic HF with EF 25-30% (Echo 4/2024)  #History of CAD/Valvular Disease (Mild-Mod MR, Moderate TR)  -Pt with SOB, dizziness, weakness on admission with overmedication  -TTE 4/2024: EF 25-30%, grade1 diastolic dysfunction, Mild-moderate MR, Moderate TR  -CXR shows improvement of congestion compared to 5/22  - Daily weight. Strict in's and out's, telemetry  -cardiac monitor  -S/p IV bumex in the ED;  -start Bumex 1g bid  -consider midodrine prn  -hold ARNI, mineralocorticoid, SGLT2 due to renal function  -c/w  toprol 25mg qd with hold parameters  -c/w aspirin, plavix  -c/w statin  -cardio consult (Dr. Croft) for AICD placement    #Worsening jaundice 2/2 hepatic congestion  #Elevated T bili/Alk Phos/mild transaminitis  -He has no abdominal pain but  scleral icterus and jaundiced skin possibly due to hepatic congestion 2/2 HF  -On last admission, Elevated GGT, elevated direct bili; RUQ US with gallstone; CTAP: Cholelithiasis with nonspecific gallbladder wall thickening. Correlate   with symptomatology right upper quadrant ultrasound, Small ascites. 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  Baseline Cr ~1.3-1.5 range  Cr.2.05 on admission  -Nephrology Consult apprec  -Avoid nephrotoxic agents  -hold off on GDMT such as ARNI, mineralocorticoid, SGLT2 due to renal function  -Monitor BMP    #Anxiety/Depression/Bipolar Disorder  -Continue prozac, mirtazapine    #BPH  -Continue flomax qhs (takes alternating 0.4mg and 0.8mg)    #History of Lung Cancer  #HARRY Lung Nodule  -s/p RUL lobectomy (9/2022) with recurrent disease s/p radiation to left lung (completed 3/2023)  -Recent PET showed The LEFT lung nodule does appear increasingly rounded solid, and although the degree of uptake there is essentially unchanged. The RIGHT lung appears to have slowly become denser over time, with the appearance of uptake there are also appearing slightly more prominent.   -Follows with Dr. Alejandre for surveillance  -consult Palliative    #DVT PPx  -Heparin SC    #FULL CODE        D/w Dr. Ball

## 2024-05-26 NOTE — PATIENT PROFILE ADULT - FALL HARM RISK - HARM RISK INTERVENTIONS

## 2024-05-26 NOTE — ED ADULT NURSE NOTE - NSFALLUNIVINTERV_ED_ALL_ED
no
Bed/Stretcher in lowest position, wheels locked, appropriate side rails in place/Call bell, personal items and telephone in reach/Instruct patient to call for assistance before getting out of bed/chair/stretcher/Non-slip footwear applied when patient is off stretcher/[place] to call system/Physically safe environment - no spills, clutter or unnecessary equipment/Purposeful proactive rounding/Room/bathroom lighting operational, light cord in reach

## 2024-05-26 NOTE — ED PROVIDER NOTE - OBJECTIVE STATEMENT
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% (Echo 4/2024), valvular disease (mild-moderate MR, moderate TR), CKD, anxiety/depression/bipolar disorder, history of lung cancer s/p RUL lobectomy (9/2022) with recurrent disease s/p radiation to left lung (completed 3/2023),  admitted 4/6-4/10 for dizziness felt to be due to overdiuresis, discharged yesterday from the hospital came to ed cc sob dyspnea at rest and low bp better with Small amount of IV fluids Patient is requiring 2 L nasal cannula in the emergency room with saturation of 99%Patient's current medications are Bumex 2 mg aspirin Plavix Prozac Crestor Flomax mirtazapine and metoprolol which was recently started in the last admission according to the wife may be Metroprolol dropped the blood pressure Patient's cardiologist is Dr. 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% (Echo 4/2024), valvular disease (mild-moderate MR, moderate TR), CKD, anxiety/depression/bipolar disorder, history of lung cancer s/p RUL lobectomy (9/2022) with recurrent disease s/p radiation to left lung (completed 3/2023),  admitted 4/6-4/10 for dizziness felt to be due to overdiuresis, discharged yesterday from the hospital came to ed cc sob dyspnea at rest and low bp better with Small amount of IV fluids Patient is requiring 2 L nasal cannula in the emergency room with saturation of 99%Patient's current medications are Bumex 2 mg aspirin Plavix Prozac Crestor Flomax mirtazapine and metoprolol which was recently started in the last admission according to the wife may be Metroprolol dropped the blood pressure Patient's cardiologist is Dr. Suleman Thompson telephone #[phone]

## 2024-05-27 LAB
ALBUMIN SERPL ELPH-MCNC: 3.1 G/DL — LOW (ref 3.3–5)
ALP SERPL-CCNC: 316 U/L — HIGH (ref 40–120)
ALT FLD-CCNC: 63 U/L — HIGH (ref 10–45)
ANION GAP SERPL CALC-SCNC: 15 MMOL/L — SIGNIFICANT CHANGE UP (ref 5–17)
AST SERPL-CCNC: 90 U/L — HIGH (ref 10–40)
BASOPHILS # BLD AUTO: 0.07 K/UL — SIGNIFICANT CHANGE UP (ref 0–0.2)
BASOPHILS NFR BLD AUTO: 1 % — SIGNIFICANT CHANGE UP (ref 0–2)
BILIRUB SERPL-MCNC: 3 MG/DL — HIGH (ref 0.2–1.2)
BUN SERPL-MCNC: 81 MG/DL — HIGH (ref 7–23)
CALCIUM SERPL-MCNC: 8.8 MG/DL — SIGNIFICANT CHANGE UP (ref 8.4–10.5)
CHLORIDE SERPL-SCNC: 101 MMOL/L — SIGNIFICANT CHANGE UP (ref 96–108)
CO2 SERPL-SCNC: 20 MMOL/L — LOW (ref 22–31)
CREAT SERPL-MCNC: 2.28 MG/DL — HIGH (ref 0.5–1.3)
EGFR: 28 ML/MIN/1.73M2 — LOW
EOSINOPHIL # BLD AUTO: 0.09 K/UL — SIGNIFICANT CHANGE UP (ref 0–0.5)
EOSINOPHIL NFR BLD AUTO: 1.3 % — SIGNIFICANT CHANGE UP (ref 0–6)
GLUCOSE SERPL-MCNC: 99 MG/DL — SIGNIFICANT CHANGE UP (ref 70–99)
HCT VFR BLD CALC: 33.7 % — LOW (ref 39–50)
HGB BLD-MCNC: 11.4 G/DL — LOW (ref 13–17)
IGA FLD-MCNC: 290 MG/DL — SIGNIFICANT CHANGE UP (ref 84–499)
IGG FLD-MCNC: 1483 MG/DL — SIGNIFICANT CHANGE UP (ref 610–1660)
IGM SERPL-MCNC: 95 MG/DL — SIGNIFICANT CHANGE UP (ref 35–242)
IMM GRANULOCYTES NFR BLD AUTO: 0.4 % — SIGNIFICANT CHANGE UP (ref 0–0.9)
KAPPA LC SER QL IFE: 5.25 MG/DL — HIGH (ref 0.33–1.94)
KAPPA/LAMBDA FREE LIGHT CHAIN RATIO, SERUM: 1.88 RATIO — HIGH (ref 0.26–1.65)
LAMBDA LC SER QL IFE: 2.8 MG/DL — HIGH (ref 0.57–2.63)
LYMPHOCYTES # BLD AUTO: 1.01 K/UL — SIGNIFICANT CHANGE UP (ref 1–3.3)
LYMPHOCYTES # BLD AUTO: 14.3 % — SIGNIFICANT CHANGE UP (ref 13–44)
MCHC RBC-ENTMCNC: 31.4 PG — SIGNIFICANT CHANGE UP (ref 27–34)
MCHC RBC-ENTMCNC: 33.8 GM/DL — SIGNIFICANT CHANGE UP (ref 32–36)
MCV RBC AUTO: 92.8 FL — SIGNIFICANT CHANGE UP (ref 80–100)
MONOCYTES # BLD AUTO: 1.39 K/UL — HIGH (ref 0–0.9)
MONOCYTES NFR BLD AUTO: 19.7 % — HIGH (ref 2–14)
NEUTROPHILS # BLD AUTO: 4.48 K/UL — SIGNIFICANT CHANGE UP (ref 1.8–7.4)
NEUTROPHILS NFR BLD AUTO: 63.3 % — SIGNIFICANT CHANGE UP (ref 43–77)
NRBC # BLD: 0 /100 WBCS — SIGNIFICANT CHANGE UP (ref 0–0)
PLATELET # BLD AUTO: 161 K/UL — SIGNIFICANT CHANGE UP (ref 150–400)
POTASSIUM SERPL-MCNC: 3.8 MMOL/L — SIGNIFICANT CHANGE UP (ref 3.5–5.3)
POTASSIUM SERPL-SCNC: 3.8 MMOL/L — SIGNIFICANT CHANGE UP (ref 3.5–5.3)
PROT SERPL-MCNC: 7.1 G/DL — SIGNIFICANT CHANGE UP (ref 6–8.3)
RBC # BLD: 3.63 M/UL — LOW (ref 4.2–5.8)
RBC # FLD: 17.2 % — HIGH (ref 10.3–14.5)
SODIUM SERPL-SCNC: 136 MMOL/L — SIGNIFICANT CHANGE UP (ref 135–145)
WBC # BLD: 7.07 K/UL — SIGNIFICANT CHANGE UP (ref 3.8–10.5)
WBC # FLD AUTO: 7.07 K/UL — SIGNIFICANT CHANGE UP (ref 3.8–10.5)

## 2024-05-27 RX ORDER — MIDODRINE HYDROCHLORIDE 2.5 MG/1
5 TABLET ORAL THREE TIMES A DAY
Refills: 0 | Status: DISCONTINUED | OUTPATIENT
Start: 2024-05-27 | End: 2024-05-28

## 2024-05-27 RX ORDER — CARVEDILOL PHOSPHATE 80 MG/1
3.12 CAPSULE, EXTENDED RELEASE ORAL EVERY 12 HOURS
Refills: 0 | Status: DISCONTINUED | OUTPATIENT
Start: 2024-05-27 | End: 2024-05-28

## 2024-05-27 RX ORDER — DIAZEPAM 5 MG
2 TABLET ORAL DAILY
Refills: 0 | Status: DISCONTINUED | OUTPATIENT
Start: 2024-05-27 | End: 2024-05-28

## 2024-05-27 RX ADMIN — Medication 2 MILLIGRAM(S): at 19:42

## 2024-05-27 RX ADMIN — MIDODRINE HYDROCHLORIDE 5 MILLIGRAM(S): 2.5 TABLET ORAL at 14:02

## 2024-05-27 RX ADMIN — MIRTAZAPINE 15 MILLIGRAM(S): 45 TABLET, ORALLY DISINTEGRATING ORAL at 21:20

## 2024-05-27 RX ADMIN — MIDODRINE HYDROCHLORIDE 5 MILLIGRAM(S): 2.5 TABLET ORAL at 17:20

## 2024-05-27 RX ADMIN — Medication 81 MILLIGRAM(S): at 11:38

## 2024-05-27 RX ADMIN — Medication 20 MILLIGRAM(S): at 06:43

## 2024-05-27 RX ADMIN — CARVEDILOL PHOSPHATE 3.12 MILLIGRAM(S): 80 CAPSULE, EXTENDED RELEASE ORAL at 18:09

## 2024-05-27 RX ADMIN — TEMAZEPAM 15 MILLIGRAM(S): 15 CAPSULE ORAL at 21:20

## 2024-05-27 RX ADMIN — ATORVASTATIN CALCIUM 80 MILLIGRAM(S): 80 TABLET, FILM COATED ORAL at 21:20

## 2024-05-27 RX ADMIN — BUMETANIDE 1 MILLIGRAM(S): 0.25 INJECTION INTRAMUSCULAR; INTRAVENOUS at 05:26

## 2024-05-27 RX ADMIN — CLOPIDOGREL BISULFATE 75 MILLIGRAM(S): 75 TABLET, FILM COATED ORAL at 11:37

## 2024-05-27 RX ADMIN — BUMETANIDE 1 MILLIGRAM(S): 0.25 INJECTION INTRAMUSCULAR; INTRAVENOUS at 15:09

## 2024-05-27 RX ADMIN — LATANOPROST 1 DROP(S): 0.05 SOLUTION/ DROPS OPHTHALMIC; TOPICAL at 21:20

## 2024-05-27 NOTE — PROGRESS NOTE ADULT - ASSESSMENT
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    #Acute Decompensated Heart Failure  #History of Combined Systolic and Diastolic HF with EF 25-30% (Echo 4/2024)  #History of CAD/Valvular Disease (Mild-Mod MR, Moderate TR)  Clinically Euvolemic  TTE 4/2024: EF 25-30%, grade1 diastolic dysfunction, Mild-moderate MR, Moderate TR  -cardiology consult apprec  -Daily weight. Strict in's and out's, telemetry  -restart bumex  -hold ARNI, mineralocorticoid, SGLT2 due to renal function - may resume as outpatient, renal function dependent  -c/w toprol 25mg qd  -c/w aspirin, plavix  -c/w statin  -c/w bumex 2mg qd    #BRODY on CKD stage III 2/2 possible cardio-renal syndrome vs. over-diuresis   Baseline Cr ~1.3-1.5 range  Cr: 1.95>2.11>2.10>1.79  -Nephrology Consult apprec  -Avoid nephrotoxic agents  -hold off on ARNI, mineralocorticoid, SGLT2   -Monitor BMP    #Elevated T bili/Alk Phos/mild transaminitis  He has no abdominal pain, possibly due to hepatic congestion  Elevated GGT, elevated direct bili  RUQ US with gallstone  CTAP: Cholelithiasis with  nonspecific gallbladder wall thickening. Correlate   with symptomatology right upper quadrant ultrasound, Small ascites.  CTAP with contrast unable to be performed to do renal function  -GI consult apprec: likely 2/2 hepatic congestion  -Monitor CMP    #Anxiety/Depression/Bipolar Disorder  -Continue prozac, mirtazapine    #BPH  -Continue flomax (takes alternating 0.4mg and 0.8mg)    #History of Lung Cancer  #HARRY Lung Nodule  -s/p RUL lobectomy (9/2022) with recurrent disease s/p radiation to left lung (completed 3/2023)  -Recent PET showed The LEFT lung nodule does appear increasingly rounded solid, and although the degree of uptake there is essentially unchanged. The RIGHT lung appears to have slowly become denser over time, with the appearance of uptake there are also appearing slightly more prominent.   -Follows with Dr. Alejandre for surveillance    #DVT PPx  -Heparin SC    #FULL CODE    Dispo: Home when optimized  Diet: DASH    Discussed with Dr. 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% (Echo 4/2024), valvular disease (mild-moderate MR, moderate TR), CKD, anxiety/depression/bipolar disorder, history of lung cancer s/p RUL lobectomy (9/2022) with recurrent disease s/p radiation to left lung (completed 3/2023), recently admitted 4/6-4/10 for dizziness felt to be due to overdiuresis, who presents from home for weight gain, increased abdominal girth, and OGLESBY, in observation for:    #Acute Decompensated Heart Failure  #History of Combined Systolic and Diastolic HF with EF 25-30% (Echo 4/2024)  #History of CAD/Valvular Disease (Mild-Mod MR, Moderate TR)  Clinically Euvolemic  TTE 4/2024: EF 25-30%, grade1 diastolic dysfunction, Mild-moderate MR, Moderate TR  -cardiology consult apprec  -Palliative care consult  -Daily weight. Strict in's and out's, telemetry  -hold ARNI, mineralocorticoid, SGLT2 due to renal function - may resume as outpatient, renal function dependent  -c/w aspirin, plavix  -c/w statin  -switch toprol 25mg qd to coreg 3.125 BID  -switch bumex 2mg qd to 1mg BID    #BRODY on CKD stage III 2/2 possible cardio-renal syndrome  Baseline Cr ~1.3-1.5 range  Cr: 2.28  -Nephrology Consult apprec  -Avoid nephrotoxic agents  -hold off on ARNI, mineralocorticoid, SGLT2   -Monitor BMP    #Elevated T bili/Alk Phos/mild transaminitis  He has no abdominal pain, possibly due to hepatic congestion  Elevated GGT, elevated direct bili  RUQ US with gallstone  CTAP: Cholelithiasis with  nonspecific gallbladder wall thickening. Correlate   with symptomatology right upper quadrant ultrasound, Small ascites.  CTAP with contrast unable to be performed to do renal function  -GI consult apprec: likely 2/2 hepatic congestion  -Monitor CMP    #Anxiety/Depression/Bipolar Disorder  -Continue prozac, mirtazapine    #BPH  -stop flomax due to hypotension    #History of Lung Cancer  #HARRY Lung Nodule  -s/p RUL lobectomy (9/2022) with recurrent disease s/p radiation to left lung (completed 3/2023)  -Recent PET showed The LEFT lung nodule does appear increasingly rounded solid, and although the degree of uptake there is essentially unchanged. The RIGHT lung appears to have slowly become denser over time, with the appearance of uptake there are also appearing slightly more prominent.   -Follows with Dr. Alejandre for surveillance    #DVT PPx  -Heparin SC    #FULL CODE    Dispo: Home when optimized  Diet: DASH    Discussed with Dr. Sheehan

## 2024-05-27 NOTE — PROGRESS NOTE ADULT - SUBJECTIVE AND OBJECTIVE BOX
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    Overnight Events: None  Interval History: Patient was seen and examined by me this morning at bedside. Says he feels significantly better this AM    REVIEW OF SYSTEMS:  CONSTITUTIONAL: No weakness, fevers or chills  EYES/ENT: No visual changes;  No vertigo or throat pain   NECK: No pain or stiffness  RESPIRATORY: No cough, wheezing, hemoptysis; No shortness of breath  CARDIOVASCULAR: No chest pain or palpitations  GASTROINTESTINAL: No abdominal or epigastric pain. No nausea, vomiting; No diarrhea or constipation.   GENITOURINARY: No dysuria, frequency or hematuria  NEUROLOGICAL: No numbness or weakness  SKIN: No itching, burning, rashes, or lesions     Vital Signs Last 24 Hrs  T(C): 36.4 (25 May 2024 05:23), Max: 37.2 (24 May 2024 11:20)  T(F): 97.5 (25 May 2024 05:23), Max: 98.9 (24 May 2024 11:20)  HR: 87 (25 May 2024 05:23) (79 - 87)  BP: 105/65 (25 May 2024 05:23) (103/62 - 113/66)  BP(mean): --  RR: 18 (25 May 2024 05:23) (17 - 18)  SpO2: 96% (25 May 2024 05:23) (94% - 97%)    Parameters below as of 25 May 2024 05:23  Patient On (Oxygen Delivery Method): room air    I&O's Summary    24 May 2024 07:01  -  25 May 2024 07:00  --------------------------------------------------------  IN: 200 mL / OUT: 700 mL / NET: -500 mL      PHYSICAL EXAM  Constitutional: Pt lying in bed, awake and alert, NAD, overall appears mildly jaundiced  HEENT: EOMI, normocephalic, moist mucous membranes  Neck: Soft and supple  Respiratory: CTABL, No wheezing, rales or rhonchi  Cardiovascular: S1S2+, no M/G/R  Gastrointestinal: BS+, soft, NT, no guarding, no rebound +distended (-) fluid wave  Extremities: No calf pain, 1+ symmetrical b/l LE edema, bilateral varicosities  Vascular: Peripheral pulses present, b/l LE varicosities  Neurological: AAOx3, no focal deficits  Musculoskeletal: Normal muscle tone, no atrophy, no rigidity, no contractions    MEDICATIONS  (STANDING):  aspirin enteric coated 81 milliGRAM(s) Oral daily  atorvastatin 80 milliGRAM(s) Oral at bedtime  clopidogrel Tablet 75 milliGRAM(s) Oral daily  heparin   Injectable 5000 Unit(s) SubCutaneous every 8 hours  latanoprost 0.005% Ophthalmic Solution 1 Drop(s) Both EYES at bedtime  metoprolol succinate ER 25 milliGRAM(s) Oral daily  mirtazapine 15 milliGRAM(s) Oral at bedtime  polyethylene glycol 3350 17 Gram(s) Oral two times a day  tamsulosin 0.8 milliGRAM(s) Oral <User Schedule>  tamsulosin 0.4 milliGRAM(s) Oral <User Schedule>    MEDICATIONS  (PRN):  acetaminophen     Tablet .. 650 milliGRAM(s) Oral every 6 hours PRN Temp greater or equal to 38C (100.4F), Mild Pain (1 - 3)  aluminum hydroxide/magnesium hydroxide/simethicone Suspension 30 milliLiter(s) Oral every 4 hours PRN Dyspepsia  melatonin 3 milliGRAM(s) Oral at bedtime PRN Insomnia  ondansetron Injectable 4 milliGRAM(s) IV Push every 8 hours PRN Nausea and/or Vomiting      LABS: All Labs Reviewed:                  11.7                 136  | 26   | 63           5.35  >-----------< 148     ------------------------< 109                   36.0                 3.7  | 99   | 1.79                                         Ca 9.0   Mg 2.4   Ph 4.9    Urinalysis Basic - ( 25 May 2024 06:07 )    Color: x / Appearance: x / SG: x / pH: x  Gluc: 109 mg/dL / Ketone: x  / Bili: x / Urobili: x   Blood: x / Protein: x / Nitrite: x   Leuk Esterase: x / RBC: x / WBC x   Sq Epi: x / Non Sq Epi: x / Bacteria: x    RADIOLOGY/EKG (personally reviewed):  < from: 12 Lead ECG (05.22.24 @ 14:25) >  Diagnosis Line Sinus rhythm [id number] degree AV block with frequent premature ventricular complexes  Possible Left atrial enlargement  Left axis deviation  Anteroseptal infarct (cited on or before 11-MAY-2022)  Abnormal ECG  When compared with ECG of 19-APR-2024 14:39,  Premature ventricular complexes are now present  Confirmed by Luis Carlos Gilliam ([id number]) on 5/22/2024 3:52:42 PM    < end of copied text >  < from: Xray Chest 1 View- PORTABLE-Urgent (05.22.24 @ 15:09) >  IMPRESSION: Small right base effusion new since prior. Other findings   stable.    < end of copied text >    < from: CT Abdomen and Pelvis No Cont (05.24.24 @ 08:58) >  Cholelithiasis with  nonspecific gallbladder wall thickening. Correlate   with symptomatology right upper quadrant ultrasound    Small ascites.    Additional findings as discussed    < end of copied text >  < from: US Kidney and Bladder (05.23.24 @ 12:53) >  IMPRESSION:  Enlarged prostate and a possible chronic bladder outlet obstruction with   43 cc of post void residual.  No evidence of hydronephrosis.    < end of copied text >  < from: US Abdomen Upper Quadrant Right (05.23.24 @ 11:25) >  Gallstones, gallbladder mural thickening without palpable tenderness, not   significantly changed from 5/5/2024 , when allowing for differences in   modality. . 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    Overnight Events: None  Interval History: Patient was seen and examined by me this morning at bedside. Says he feels significantly better this AM    REVIEW OF SYSTEMS:  CONSTITUTIONAL: No weakness, fevers or chills  EYES/ENT: No visual changes;  No vertigo or throat pain   NECK: No pain or stiffness  RESPIRATORY: No cough, wheezing, hemoptysis; No shortness of breath  CARDIOVASCULAR: No chest pain or palpitations  GASTROINTESTINAL: No abdominal or epigastric pain. No nausea, vomiting; No diarrhea or constipation.   GENITOURINARY: No dysuria, frequency or hematuria  NEUROLOGICAL: No numbness or weakness  SKIN: No itching, burning, rashes, or lesions     Vital Signs Last 24 Hrs  T(C): 36.4 (27 May 2024 04:45), Max: 36.7 (26 May 2024 18:15)  T(F): 97.6 (27 May 2024 04:45), Max: 98 (26 May 2024 18:15)  HR: 74 (27 May 2024 04:45) (65 - 87)  BP: 100/63 (27 May 2024 04:45) (94/76 - 117/76)  BP(mean): 83 (26 May 2024 17:08) (75 - 83)  RR: 19 (27 May 2024 04:45) (16 - 19)  SpO2: 94% (27 May 2024 04:45) (94% - 99%)    Parameters below as of 27 May 2024 04:45  Patient On (Oxygen Delivery Method): room air      PHYSICAL EXAM  Constitutional: Pt lying in bed, awake and alert, NAD, overall appears mildly jaundiced  HEENT: EOMI, normocephalic, moist mucous membranes  Neck: Soft and supple  Respiratory: CTABL, No wheezing, rales or rhonchi  Cardiovascular: S1S2+, no M/G/R  Gastrointestinal: BS+, soft, NT, ND no guarding, no rebound   Extremities: No calf pain, no edema, bilateral varicosities  Vascular: Peripheral pulses present, b/l LE varicosities  Neurological: AAOx3, no focal deficits  Musculoskeletal: Normal muscle tone, no atrophy, no rigidity, no contractions    MEDICATIONS  (STANDING):  aspirin enteric coated 81 milliGRAM(s) Oral daily  atorvastatin 80 milliGRAM(s) Oral at bedtime  clopidogrel Tablet 75 milliGRAM(s) Oral daily  heparin   Injectable 5000 Unit(s) SubCutaneous every 8 hours  latanoprost 0.005% Ophthalmic Solution 1 Drop(s) Both EYES at bedtime  metoprolol succinate ER 25 milliGRAM(s) Oral daily  mirtazapine 15 milliGRAM(s) Oral at bedtime  polyethylene glycol 3350 17 Gram(s) Oral two times a day  tamsulosin 0.8 milliGRAM(s) Oral <User Schedule>  tamsulosin 0.4 milliGRAM(s) Oral <User Schedule>    MEDICATIONS  (PRN):  acetaminophen     Tablet .. 650 milliGRAM(s) Oral every 6 hours PRN Temp greater or equal to 38C (100.4F), Mild Pain (1 - 3)  aluminum hydroxide/magnesium hydroxide/simethicone Suspension 30 milliLiter(s) Oral every 4 hours PRN Dyspepsia  melatonin 3 milliGRAM(s) Oral at bedtime PRN Insomnia  ondansetron Injectable 4 milliGRAM(s) IV Push every 8 hours PRN Nausea and/or Vomiting      LABS: All Labs Reviewed:                11.4                 136  | 20   | 81           7.07  >-----------< 161     ------------------------< 99                    33.7                 3.8  | 101  | 2.28                                         Ca 8.8   Mg x     Ph x      Urinalysis Basic - ( 27 May 2024 06:52 )    Color: x / Appearance: x / SG: x / pH: x  Gluc: 99 mg/dL / Ketone: x  / Bili: x / Urobili: x   Blood: x / Protein: x / Nitrite: x   Leuk Esterase: x / RBC: x / WBC x   Sq Epi: x / Non Sq Epi: x / Bacteria: x    RADIOLOGY/EKG (personally reviewed):  < from: 12 Lead ECG (05.26.24 @ 14:10) >  Diagnosis Line Sinus rhythm [id number] degree AV block with occasional premature ventricular complexes  Left axis deviation  Incomplete right bundle branch block  Anteroseptal infarct (cited on or before 11-MAY-2022)  Abnormal ECG  When compared with ECG of 22-MAY-2024 14:25,  No significant change was found  Confirmed by ALBIN COLON, JESSICA TABOR ([id number]) on 5/27/2024 8:14:20 AM    < end of copied text >  < from: Xray Chest 1 View- PORTABLE-Urgent (05.26.24 @ 14:52) >    IMPRESSION: Stable findings as above.    < end of copied text >

## 2024-05-27 NOTE — PROGRESS NOTE ADULT - ATTENDING COMMENTS
Please see resident note for full details regarding the service.     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 Ardon regarding this pt -> Metoprolol was acceptable option for CHFrEF; howver, will try and switch to Coreg 3.125 mg BID, c/w 1 mg Bumex BID, d/c Flomax, Cardiomems and AICD would be considered outpatient, needs to follow up with his CHF specialist   - ARNI/ACE/ARB/mineralocorticoid/farxiga contraindicated currently d/t BRODY on CKD per conversation with nephrology last admission   - Hold Flomax   - I had consulted GI and surgery last admission, transaminitis d/t CHF, no biliary ductal dilation and no indication for emergent/urgent surgical intervention at this time   - SpO2 96-99% on room air -> exercise on ambulation   - Nephrology consult pending   - Palliative care consult   - Code status: Full Code   - Dispo: If patient improving tomorrow, will discuss with cardiology and likely d/c home     I spent a total of 50 minutes on the date of this encounter coordinating the patient's care, excluding resident teaching time. This includes reviewing results/imaging and discussions with specialists, nursing, case management/social work. Further tests, medications, and procedures have been ordered as indicated. Results and the plan of care were communicated to the patient and/or their family member. Supporting documentation was completed and added to the patient's chart. Please see resident note for full details regarding the service.     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 Ardon regarding this pt -> Metoprolol was acceptable option for CHFrEF; howver, will try and switch to Coreg 3.125 mg BID, c/w 1 mg Bumex BID, d/c Flomax, Cardiomems and AICD would be considered outpatient, needs to follow up with his CHF specialist   - ARNI/ACE/ARB/mineralocorticoid/farxiga contraindicated currently d/t BRODY on CKD per conversation with nephrology last admission   - Hold Flomax   - I had consulted GI and surgery last admission, transaminitis d/t CHF, no biliary ductal dilation and no indication for emergent/urgent surgical intervention at this time   - SpO2 96-99% on room air -> exercise on ambulation   - Nephrology consult pending   - Elevated monocytes on CBC with progressing renal dysfunction and anemia (no hypercalcemia) -> ordered SPEP, UPEP, immunofixation, light chains   - Palliative care consult   - Code status: Full Code   - Dispo: If patient improving tomorrow, will discuss with cardiology and likely d/c home     I spent a total of 50 minutes on the date of this encounter coordinating the patient's care, excluding resident teaching time. This includes reviewing results/imaging and discussions with specialists, nursing, case management/social work. Further tests, medications, and procedures have been ordered as indicated. Results and the plan of care were communicated to the patient and/or their family member. Supporting documentation was completed and added to the patient's chart.

## 2024-05-27 NOTE — PROGRESS NOTE ADULT - CONVERSATION DETAILS
I had a detailed discussion with the patient regarding their goals of care. We discussed that cardiopulmonary resuscitation (CPR) can be completed if the patient were to go into cardiac arrest. This includes chest compressions and delivering shocks if needed to restart their heart and intubation/mechanical ventilation to maintain their airway. The risks of CPR were discussed with the patient including rib fractures, damage to internal organs, and the possibility of anoxic brain injury or increased physical debility. At this time, the patient states that they are agreeable to chest compressions and intubation if needed to save their life in an emergency. Palliative care consult pending.

## 2024-05-27 NOTE — CONSULT NOTE ADULT - SUBJECTIVE AND OBJECTIVE BOX
[facility] Consultation    CHIEF COMPLAINT: Patient is a 79y old  Male who presents with a chief complaint of Acute decompensated heart failure (27 May 2024 06:35)  Asked by primary team to see patient regarding decompensated heart failure, history of HFrEF.  The patient is seen and examined, the chart is reviewed.  History from patient who is of good reliability.  Patient known to me from recent hospital admission.    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  Pt accompanied by his wife. Pt notes that after coming home yesterday from [facility], pt experienced tiredness, nausea and dry heaving at night. Pt endorses taking Bumex, metoprolol, plavix and ASA all together late this morning and started feeling dizziness, and worsening dyspnea associated with some weakness, causing him to BIBA to [facility]. In the ED, pt was found to be hypoxic sat 90% on RA and hypotensive.  Labs were notable for h&h 12.6/38.2, BUN/Cr 68/2.05, Alkphos 317, AST 76, BNP 5050. CXR showed cardiomegaly and peripheral vascular congestion. Pt was given fluids, one dose IV bumex.. RVP negative. (26 May 2024 16:10)    As above.  Patient currently sitting in chair on telemetry, no cardiopulmonary distress.    PAST MEDICAL & SURGICAL HISTORY:  BPH (benign prostatic hyperplasia)      Bipolar disorder      Depression      Anxiety      Umbilical hernia, incarcerated      Depression      Constipation      Urinary retention      CAD (coronary artery disease)      SCC (squamous cell carcinoma)      Lung mass      Other nonspecific abnormal finding of lung field      LV (left ventricular) mural thrombus      History of inguinal hernia      History of CHF (congestive heart failure)      History of arthroscopy of knee  Right, 1987      History of tonsillectomy  1952      History of hernia repair      H/O coronary angiogram          SOCIAL HISTORY:    FAMILY HISTORY: FAMILY HISTORY:  Family history of depression (Mother)    FH: CAD (coronary artery disease) (Sibling, Sibling)    Family history of diabetes mellitus (DM) (Sibling)    Home Medications:  aspirin 81 mg oral delayed release tablet: 1 tab(s) orally once a day (26 May 2024 16:22)  clopidogrel 75 mg oral tablet: 1 tab(s) orally once a day (26 May 2024 16:22)  Flomax 0.4 mg oral capsule: 1 cap(s) orally once a day Takes alternating 1 cap and 2 cap (26 May 2024 16:22)  FLUoxetine (Eqv-PROzac) 20 mg oral tablet: 1 tab(s) orally every 3 days (26 May 2024 16:22)  latanoprost 0.005% ophthalmic solution: 1 drop(s) in each eye once a day (26 May 2024 16:22)  mirtazapine 15 mg oral tablet: 1 tab(s) orally once a day (at bedtime) (26 May 2024 16:22)  temazepam 15 mg oral capsule: 1 cap(s) orally once a day (at bedtime) as needed for  insomnia (26 May 2024 16:22)      MEDICATIONS  (STANDING):  aspirin enteric coated 81 milliGRAM(s) Oral daily  atorvastatin 80 milliGRAM(s) Oral at bedtime  buMETAnide 1 milliGRAM(s) Oral two times a day  clopidogrel Tablet 75 milliGRAM(s) Oral daily  FLUoxetine 20 milliGRAM(s) Oral <User Schedule>  latanoprost 0.005% Ophthalmic Solution 1 Drop(s) Both EYES at bedtime  mirtazapine 15 milliGRAM(s) Oral at bedtime  tamsulosin 0.4 milliGRAM(s) Oral <User Schedule>  tamsulosin 0.8 milliGRAM(s) Oral <User Schedule>    MEDICATIONS  (PRN):  acetaminophen     Tablet .. 650 milliGRAM(s) Oral every 6 hours PRN Temp greater or equal to 38C (100.4F), Mild Pain (1 - 3)  aluminum hydroxide/magnesium hydroxide/simethicone Suspension 30 milliLiter(s) Oral every 4 hours PRN Dyspepsia  melatonin 3 milliGRAM(s) Oral at bedtime PRN Insomnia  midodrine. 5 milliGRAM(s) Oral three times a day PRN Hypotension  ondansetron Injectable 4 milliGRAM(s) IV Push every 8 hours PRN Nausea and/or Vomiting  temazepam 15 milliGRAM(s) Oral at bedtime PRN Insomnia      Allergies    No Known Allergies    Intolerances        REVIEW OF SYSTEMS:    CONSTITUTIONAL: weakness   EYES: No visual changes, No diplopia  ENMT: No throat pain , No exudate  NECK: No pain or stiffness  RESPIRATORY: No cough, wheezing, hemoptysis; No shortness of breath  CARDIOVASCULAR: No chest pain or chest pressure.    No palpitations, syncope or near syncope.  No edema, no orthopnea.   GASTROINTESTINAL: No abdominal pain. No nausea, vomiting, or hematemesis; No diarrhea or constipation. No melena or hematochezia.  GENITOURINARY: No dysuria, frequency or hematuria  NEUROLOGICAL: No numbness or weakness  SKIN: No itching or rash  All other review of systems is negative unless indicated above    VITAL SIGNS:   Vital Signs Last 24 Hrs  T(C): 36.4 (27 May 2024 04:45), Max: 36.7 (26 May 2024 18:15)  T(F): 97.6 (27 May 2024 04:45), Max: 98 (26 May 2024 18:15)  HR: 74 (27 May 2024 04:45) (65 - 87)  BP: 100/63 (27 May 2024 04:45) (94/76 - 117/76)  BP(mean): 83 (26 May 2024 17:08) (75 - 83)  RR: 19 (27 May 2024 04:45) (16 - 19)  SpO2: 94% (27 May 2024 04:45) (94% - 99%)    Parameters below as of 27 May 2024 04:45  Patient On (Oxygen Delivery Method): room air        I&O's Summary      PHYSICAL EXAM:    Constitutional: NAD, awake and alert, well-developed  Eyes:  EOMI,  Pupils round, no lesions  Neck: approx 10 cm JVD   Pulmonary: decreased breath sounds at bases   Cardiovascular: PMI not palpable non-displaced Regular S1 and S2 l/Vl systolic murmur   Gastrointestinal: Bowel Sounds present, soft, nontender.   Lymph: No peripheral edema. No cervical lymphadenopathy.  Neurological: Alert, no focal deficits  Skin: No rashes. Changes of chronic venous stasis. No cyanosis.  Psych:  Mood & affect appropriate    LABS: All Labs Reviewed:                        11.4   7.07  )-----------( 161      ( 27 May 2024 06:52 )             33.7                         12.6   8.09  )-----------( 173      ( 26 May 2024 14:25 )             38.2                         11.7   5.35  )-----------( 148      ( 25 May 2024 06:07 )             36.0     27 May 2024 06:52    136    |  101    |  81     ----------------------------<  99     3.8     |  20     |  2.28   26 May 2024 14:25    135    |  98     |  68     ----------------------------<  112    5.2     |  21     |  2.05   25 May 2024 06:07    136    |  99     |  63     ----------------------------<  109    3.7     |  26     |  1.79     Ca    8.8        27 May 2024 06:52  Ca    8.8        26 May 2024 14:25  Ca    9.0        25 May 2024 06:07  Phos  4.9       25 May 2024 06:07  Mg     2.4       25 May 2024 06:07    TPro  7.1    /  Alb  3.1    /  TBili  3.0    /  DBili  x      /  AST  90     /  ALT  63     /  AlkPhos  316    27 May 2024 06:52  TPro  7.6    /  Alb  3.3    /  TBili  3.4    /  DBili  x      /  AST  76     /  ALT  38     /  AlkPhos  317    26 May 2024 14:25  TPro  7.4    /  Alb  3.3    /  TBili  2.3    /  DBili  x      /  AST  42     /  ALT  38     /  AlkPhos  311    25 May 2024 06:07      tele SR    < from: 12 Lead ECG (05.26.24 @ 14:10) >  Diagnosis Line Sinus rhythm [id number] degree AV block with occasional premature ventricular complexes  Left axis deviation  Incomplete right bundle branch block  Anteroseptal infarct (cited on or before 11-MAY-2022)  Abnormal ECG  When compared with ECG of 22-MAY-2024 14:25,  No significant change was found    < end of copied text >  < from: TTE Echo Complete w/o Contrast w/ Doppler (04.07.24 @ 11:12) >   1. Left ventricular ejection fraction, by visual estimation, is 25 to   30%.   2. Severely decreased global left ventricular systolic function.   3. Left atrial enlargement.   4. Increased LV wall thickness.   5. Spectral Dopplershows impaired relaxation pattern of left   ventricular myocardial filling (Grade I diastolic dysfunction).   6. There is mild concentric left ventricular hypertrophy.   7. Mildly enlarged right ventricle.   8. Right atrial enlargement.   9. Mild to moderate mitral valve regurgitation.  10. Moderate tricuspid regurgitation.  11. Mild aortic regurgitation.  12. Mild pulmonic valve regurgitation.    < end of copied text >  < from: Xray Chest 1 View- PORTABLE-Urgent (05.26.24 @ 14:52) >  IMPRESSION: Stable findings as above.    < end of copied text >

## 2024-05-27 NOTE — CONSULT NOTE ADULT - SUBJECTIVE AND OBJECTIVE BOX
NEPHROLOGY CONSULTATION    CHIEF COMPLAINT:  BRODY/CKD      HPI:  Presents with dyspnea and new onset nausea without vomiting or abdominal pain.  Renal function slightly worse than last admission.        PAST MEDICAL & SURGICAL HISTORY:  BPH (benign prostatic hyperplasia)      Bipolar disorder      Depression      Anxiety      Umbilical hernia, incarcerated      Depression      Constipation      Urinary retention      CAD (coronary artery disease)      SCC (squamous cell carcinoma)      Lung mass      Other nonspecific abnormal finding of lung field      LV (left ventricular) mural thrombus      History of inguinal hernia      History of CHF (congestive heart failure)      History of arthroscopy of knee  Right, 1987      History of tonsillectomy  1952      History of hernia repair      H/O coronary angiogram            Social History:  No illicit drugs, or smoking. (26 May 2024 16:10)      FAMILY HISTORY:  Family history of depression (Mother)    FH: CAD (coronary artery disease) (Sibling, Sibling)    Family history of diabetes mellitus (DM) (Sibling)        Home Medications:  aspirin 81 mg oral delayed release tablet: 1 tab(s) orally once a day (26 May 2024 16:22)  clopidogrel 75 mg oral tablet: 1 tab(s) orally once a day (26 May 2024 16:22)  Flomax 0.4 mg oral capsule: 1 cap(s) orally once a day Takes alternating 1 cap and 2 cap (26 May 2024 16:22)  FLUoxetine (Eqv-PROzac) 20 mg oral tablet: 1 tab(s) orally every 3 days (26 May 2024 16:22)  latanoprost 0.005% ophthalmic solution: 1 drop(s) in each eye once a day (26 May 2024 16:22)  mirtazapine 15 mg oral tablet: 1 tab(s) orally once a day (at bedtime) (26 May 2024 16:22)  temazepam 15 mg oral capsule: 1 cap(s) orally once a day (at bedtime) as needed for  insomnia (26 May 2024 16:22)      MEDICATIONS  (STANDING):  aspirin enteric coated 81 milliGRAM(s) Oral daily  atorvastatin 80 milliGRAM(s) Oral at bedtime  buMETAnide 1 milliGRAM(s) Oral two times a day  carvedilol 3.125 milliGRAM(s) Oral every 12 hours  clopidogrel Tablet 75 milliGRAM(s) Oral daily  FLUoxetine 20 milliGRAM(s) Oral <User Schedule>  latanoprost 0.005% Ophthalmic Solution 1 Drop(s) Both EYES at bedtime  mirtazapine 15 milliGRAM(s) Oral at bedtime      PHYSICAL EXAMINATION:  /60   Conversant, no apparent distress  PERRLA, pink conjunctivae, no ptosis  Good dentition, no pharyngeal erythema  Neck non tender, no mass, no thyromegaly or nodules  Normal respiratory effort, lungs clear to auscultation  Heart with RRR, no murmurs or rubs, no peripheral edema  Abdomen softly distended, non tender  Skin no rashes, ulcers or lesions, normal turgor and temperature  Appropriate affect, AO x 3    LABS:                        11.4   7.07  )-----------( 161      ( 27 May 2024 06:52 )             33.7     05-27    136  |  101  |  81<H>  ----------------------------<  99  3.8   |  20<L>  |  2.28<H>    Ca    8.8      27 May 2024 06:52    TPro  7.1  /  Alb  3.1<L>  /  TBili  3.0<H>  /  DBili  x   /  AST  90<H>  /  ALT  63<H>  /  AlkPhos  316<H>  05-27        RADIOLOGY:  Chest X-Ray personally reviewed and shows stable small loculated pleural effusion at right lung base    CT scan shows no hydronephrosis, distended gall bladder with stone and wall thickening        ASSESSMENT:  1.  BRODY on CKD(3) - suspect prerenal azotemia  2.  Nausea, new onset, possibly biliary in orgin    PLAN:  Hold diuretics, SGLT2, ACE/ARB

## 2024-05-28 ENCOUNTER — TRANSCRIPTION ENCOUNTER (OUTPATIENT)
Age: 79
End: 2024-05-28

## 2024-05-28 ENCOUNTER — APPOINTMENT (OUTPATIENT)
Dept: FAMILY MEDICINE | Facility: CLINIC | Age: 79
End: 2024-05-28

## 2024-05-28 DIAGNOSIS — C34.90 MALIGNANT NEOPLASM OF UNSPECIFIED PART OF UNSPECIFIED BRONCHUS OR LUNG: ICD-10-CM

## 2024-05-28 DIAGNOSIS — I95.9 HYPOTENSION, UNSPECIFIED: ICD-10-CM

## 2024-05-28 LAB
ALBUMIN SERPL ELPH-MCNC: 3.2 G/DL — LOW (ref 3.3–5)
ALP SERPL-CCNC: 372 U/L — HIGH (ref 40–120)
ALT FLD-CCNC: 73 U/L — HIGH (ref 10–45)
ANION GAP SERPL CALC-SCNC: 16 MMOL/L — SIGNIFICANT CHANGE UP (ref 5–17)
AST SERPL-CCNC: 115 U/L — HIGH (ref 10–40)
BASE EXCESS BLDA CALC-SCNC: -5.3 MMOL/L — LOW (ref -2–3)
BILIRUB SERPL-MCNC: 2.9 MG/DL — HIGH (ref 0.2–1.2)
BUN SERPL-MCNC: 88 MG/DL — HIGH (ref 7–23)
CALCIUM SERPL-MCNC: 8.9 MG/DL — SIGNIFICANT CHANGE UP (ref 8.4–10.5)
CHLORIDE SERPL-SCNC: 102 MMOL/L — SIGNIFICANT CHANGE UP (ref 96–108)
CO2 BLDA-SCNC: 19 MMOL/L — SIGNIFICANT CHANGE UP (ref 19–24)
CO2 SERPL-SCNC: 20 MMOL/L — LOW (ref 22–31)
CREAT SERPL-MCNC: 2.35 MG/DL — HIGH (ref 0.5–1.3)
D DIMER BLD IA.RAPID-MCNC: 682 NG/ML DDU — HIGH
EGFR: 27 ML/MIN/1.73M2 — LOW
FERRITIN SERPL-MCNC: 93 NG/ML — SIGNIFICANT CHANGE UP (ref 30–400)
GAS PNL BLDA: SIGNIFICANT CHANGE UP
GLUCOSE SERPL-MCNC: 98 MG/DL — SIGNIFICANT CHANGE UP (ref 70–99)
HAPTOGLOB SERPL-MCNC: 30 MG/DL — LOW (ref 34–200)
HCO3 BLDA-SCNC: 18 MMOL/L — LOW (ref 21–28)
HCT VFR BLD CALC: 34.3 % — LOW (ref 39–50)
HGB BLD-MCNC: 11.6 G/DL — LOW (ref 13–17)
HOROWITZ INDEX BLDA+IHG-RTO: 28 — SIGNIFICANT CHANGE UP
IRON SATN MFR SERPL: 13 % — LOW (ref 16–55)
IRON SATN MFR SERPL: 50 UG/DL — SIGNIFICANT CHANGE UP (ref 45–165)
KAPPA LC SER QL IFE: 6.72 MG/DL — HIGH (ref 0.33–1.94)
KAPPA/LAMBDA FREE LIGHT CHAIN RATIO, SERUM: 2.07 RATIO — HIGH (ref 0.26–1.65)
LAMBDA LC SER QL IFE: 3.25 MG/DL — HIGH (ref 0.57–2.63)
LDH SERPL L TO P-CCNC: 330 U/L — HIGH (ref 50–242)
MCHC RBC-ENTMCNC: 31.6 PG — SIGNIFICANT CHANGE UP (ref 27–34)
MCHC RBC-ENTMCNC: 33.8 GM/DL — SIGNIFICANT CHANGE UP (ref 32–36)
MCV RBC AUTO: 93.5 FL — SIGNIFICANT CHANGE UP (ref 80–100)
MITOCHONDRIA AB SER-ACNC: SIGNIFICANT CHANGE UP
NRBC # BLD: 0 /100 WBCS — SIGNIFICANT CHANGE UP (ref 0–0)
PCO2 BLDA: 23 MMHG — LOW (ref 35–48)
PH BLDA: 7.5 — HIGH (ref 7.35–7.45)
PLATELET # BLD AUTO: 165 K/UL — SIGNIFICANT CHANGE UP (ref 150–400)
PO2 BLDA: 118 MMHG — HIGH (ref 83–108)
POTASSIUM SERPL-MCNC: 3.9 MMOL/L — SIGNIFICANT CHANGE UP (ref 3.5–5.3)
POTASSIUM SERPL-SCNC: 3.9 MMOL/L — SIGNIFICANT CHANGE UP (ref 3.5–5.3)
PROT SERPL-MCNC: 7 G/DL — SIGNIFICANT CHANGE UP (ref 6–8.3)
PROT SERPL-MCNC: 7.3 G/DL — SIGNIFICANT CHANGE UP (ref 6–8.3)
RBC # BLD: 3.67 M/UL — LOW (ref 4.2–5.8)
RBC # BLD: 3.67 M/UL — LOW (ref 4.2–5.8)
RBC # FLD: 17.2 % — HIGH (ref 10.3–14.5)
RETICS #: 153.4 K/UL — HIGH (ref 25–125)
RETICS/RBC NFR: 4.2 % — HIGH (ref 0.5–2.5)
SAO2 % BLDA: 98.3 % — HIGH (ref 94–98)
SMOOTH MUSCLE AB SER-ACNC: ABNORMAL
SODIUM SERPL-SCNC: 138 MMOL/L — SIGNIFICANT CHANGE UP (ref 135–145)
SOLUBLE LIVER IGG SER IA-ACNC: 1.7 — SIGNIFICANT CHANGE UP (ref 0–20)
TIBC SERPL-MCNC: 385 UG/DL — SIGNIFICANT CHANGE UP (ref 220–430)
TRANSFERRIN SERPL-MCNC: 323 MG/DL — SIGNIFICANT CHANGE UP (ref 200–360)
UIBC SERPL-MCNC: 335 UG/DL — SIGNIFICANT CHANGE UP (ref 110–370)
WBC # BLD: 6.38 K/UL — SIGNIFICANT CHANGE UP (ref 3.8–10.5)
WBC # FLD AUTO: 6.38 K/UL — SIGNIFICANT CHANGE UP (ref 3.8–10.5)

## 2024-05-28 PROCEDURE — 99223 1ST HOSP IP/OBS HIGH 75: CPT

## 2024-05-28 PROCEDURE — 71045 X-RAY EXAM CHEST 1 VIEW: CPT | Mod: 26

## 2024-05-28 PROCEDURE — 99233 SBSQ HOSP IP/OBS HIGH 50: CPT | Mod: GC

## 2024-05-28 PROCEDURE — 76705 ECHO EXAM OF ABDOMEN: CPT | Mod: 26

## 2024-05-28 PROCEDURE — 99497 ADVNCD CARE PLAN 30 MIN: CPT | Mod: 25

## 2024-05-28 RX ORDER — BUMETANIDE 0.25 MG/ML
1 INJECTION INTRAMUSCULAR; INTRAVENOUS
Refills: 0 | Status: DISCONTINUED | OUTPATIENT
Start: 2024-05-28 | End: 2024-05-28

## 2024-05-28 RX ORDER — CARVEDILOL PHOSPHATE 80 MG/1
3.12 CAPSULE, EXTENDED RELEASE ORAL EVERY 12 HOURS
Refills: 0 | Status: DISCONTINUED | OUTPATIENT
Start: 2024-05-28 | End: 2024-05-30

## 2024-05-28 RX ADMIN — ATORVASTATIN CALCIUM 80 MILLIGRAM(S): 80 TABLET, FILM COATED ORAL at 21:07

## 2024-05-28 RX ADMIN — CARVEDILOL PHOSPHATE 3.12 MILLIGRAM(S): 80 CAPSULE, EXTENDED RELEASE ORAL at 10:35

## 2024-05-28 RX ADMIN — CARVEDILOL PHOSPHATE 3.12 MILLIGRAM(S): 80 CAPSULE, EXTENDED RELEASE ORAL at 17:52

## 2024-05-28 RX ADMIN — Medication 3 MILLIGRAM(S): at 21:08

## 2024-05-28 RX ADMIN — MIRTAZAPINE 15 MILLIGRAM(S): 45 TABLET, ORALLY DISINTEGRATING ORAL at 21:08

## 2024-05-28 RX ADMIN — Medication 81 MILLIGRAM(S): at 11:09

## 2024-05-28 RX ADMIN — CLOPIDOGREL BISULFATE 75 MILLIGRAM(S): 75 TABLET, FILM COATED ORAL at 11:09

## 2024-05-28 RX ADMIN — TEMAZEPAM 15 MILLIGRAM(S): 15 CAPSULE ORAL at 21:08

## 2024-05-28 RX ADMIN — MIDODRINE HYDROCHLORIDE 5 MILLIGRAM(S): 2.5 TABLET ORAL at 06:40

## 2024-05-28 RX ADMIN — LATANOPROST 1 DROP(S): 0.05 SOLUTION/ DROPS OPHTHALMIC; TOPICAL at 21:08

## 2024-05-28 NOTE — PROGRESS NOTE ADULT - SUBJECTIVE AND OBJECTIVE BOX
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    Overnight Events: None  Interval History: Patient was seen and examined by me this morning at bedside. Says he feels significantly better this AM    REVIEW OF SYSTEMS:  CONSTITUTIONAL: No weakness, fevers or chills  EYES/ENT: No visual changes;  No vertigo or throat pain   NECK: No pain or stiffness  RESPIRATORY: No cough, wheezing, hemoptysis; No shortness of breath  CARDIOVASCULAR: No chest pain or palpitations  GASTROINTESTINAL: No abdominal or epigastric pain. No nausea, vomiting; No diarrhea or constipation.   GENITOURINARY: No dysuria, frequency or hematuria  NEUROLOGICAL: No numbness or weakness  SKIN: No itching, burning, rashes, or lesions     Vital Signs Last 24 Hrs  T(C): 36.4 (27 May 2024 04:45), Max: 36.7 (26 May 2024 18:15)  T(F): 97.6 (27 May 2024 04:45), Max: 98 (26 May 2024 18:15)  HR: 74 (27 May 2024 04:45) (65 - 87)  BP: 100/63 (27 May 2024 04:45) (94/76 - 117/76)  BP(mean): 83 (26 May 2024 17:08) (75 - 83)  RR: 19 (27 May 2024 04:45) (16 - 19)  SpO2: 94% (27 May 2024 04:45) (94% - 99%)    Parameters below as of 27 May 2024 04:45  Patient On (Oxygen Delivery Method): room air      PHYSICAL EXAM  Constitutional: Pt lying in bed, awake and alert, NAD, overall appears mildly jaundiced  HEENT: EOMI, normocephalic, moist mucous membranes  Neck: Soft and supple  Respiratory: CTABL, No wheezing, rales or rhonchi  Cardiovascular: S1S2+, no M/G/R  Gastrointestinal: BS+, soft, NT, ND no guarding, no rebound   Extremities: No calf pain, no edema, bilateral varicosities  Vascular: Peripheral pulses present, b/l LE varicosities  Neurological: AAOx3, no focal deficits  Musculoskeletal: Normal muscle tone, no atrophy, no rigidity, no contractions    MEDICATIONS  (STANDING):  aspirin enteric coated 81 milliGRAM(s) Oral daily  atorvastatin 80 milliGRAM(s) Oral at bedtime  clopidogrel Tablet 75 milliGRAM(s) Oral daily  heparin   Injectable 5000 Unit(s) SubCutaneous every 8 hours  latanoprost 0.005% Ophthalmic Solution 1 Drop(s) Both EYES at bedtime  metoprolol succinate ER 25 milliGRAM(s) Oral daily  mirtazapine 15 milliGRAM(s) Oral at bedtime  polyethylene glycol 3350 17 Gram(s) Oral two times a day  tamsulosin 0.8 milliGRAM(s) Oral <User Schedule>  tamsulosin 0.4 milliGRAM(s) Oral <User Schedule>    MEDICATIONS  (PRN):  acetaminophen     Tablet .. 650 milliGRAM(s) Oral every 6 hours PRN Temp greater or equal to 38C (100.4F), Mild Pain (1 - 3)  aluminum hydroxide/magnesium hydroxide/simethicone Suspension 30 milliLiter(s) Oral every 4 hours PRN Dyspepsia  melatonin 3 milliGRAM(s) Oral at bedtime PRN Insomnia  ondansetron Injectable 4 milliGRAM(s) IV Push every 8 hours PRN Nausea and/or Vomiting      LABS: All Labs Reviewed:                11.4                 136  | 20   | 81           7.07  >-----------< 161     ------------------------< 99                    33.7                 3.8  | 101  | 2.28                                         Ca 8.8   Mg x     Ph x      Urinalysis Basic - ( 27 May 2024 06:52 )    Color: x / Appearance: x / SG: x / pH: x  Gluc: 99 mg/dL / Ketone: x  / Bili: x / Urobili: x   Blood: x / Protein: x / Nitrite: x   Leuk Esterase: x / RBC: x / WBC x   Sq Epi: x / Non Sq Epi: x / Bacteria: x    RADIOLOGY/EKG (personally reviewed):  < from: 12 Lead ECG (05.26.24 @ 14:10) >  Diagnosis Line Sinus rhythm [id number] degree AV block with occasional premature ventricular complexes  Left axis deviation  Incomplete right bundle branch block  Anteroseptal infarct (cited on or before 11-MAY-2022)  Abnormal ECG  When compared with ECG of 22-MAY-2024 14:25,  No significant change was found  Confirmed by ALBIN COLON, JESSICA TABOR ([id number]) on 5/27/2024 8:14:20 AM    < end of copied text >  < from: Xray Chest 1 View- PORTABLE-Urgent (05.26.24 @ 14:52) >    IMPRESSION: Stable findings as above.    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    Overnight Events: None  Interval History: Patient was seen and examined by me this morning at bedside. Says he feels significantly better this AM. Denies SOB    REVIEW OF SYSTEMS:  CONSTITUTIONAL: No weakness, fevers or chills  EYES/ENT: No visual changes;  No vertigo or throat pain   NECK: No pain or stiffness  RESPIRATORY: No cough, wheezing, hemoptysis; No shortness of breath  CARDIOVASCULAR: No chest pain or palpitations  GASTROINTESTINAL: No abdominal or epigastric pain. No nausea, vomiting; No diarrhea or constipation.   GENITOURINARY: No dysuria, frequency or hematuria  NEUROLOGICAL: No numbness or weakness  SKIN: No itching, burning, rashes, or lesions                 11.4                 136  | 20   | 81           7.07  >-----------< 161     ------------------------< 99                    33.7                 3.8  | 101  | 2.28                                         Ca 8.8   Mg x     Ph x      Urinalysis Basic - ( 27 May 2024 06:52 )    Color: x / Appearance: x / SG: x / pH: x  Gluc: 99 mg/dL / Ketone: x  / Bili: x / Urobili: x   Blood: x / Protein: x / Nitrite: x   Leuk Esterase: x / RBC: x / WBC x   Sq Epi: x / Non Sq Epi: x / Bacteria: x      PHYSICAL EXAM  Constitutional: Pt lying in bed, awake and alert, NAD  HEENT: EOMI, normocephalic, moist mucous membranes  Neck: Soft and supple  Respiratory: Decreased breath sound right lower lobe, No wheezing, rales or rhonchi  Cardiovascular: S1S2+, no M/G/R  Gastrointestinal: BS+, soft, NT, ND no guarding, no rebound   Extremities: No calf pain, no edema, bilateral varicosities  Vascular: Peripheral pulses present, b/l LE varicosities  Neurological: AAOx3, no focal deficits  Musculoskeletal: Normal muscle tone, no atrophy, no rigidity, no contractions    MEDICATIONS  (STANDING):  aspirin enteric coated 81 milliGRAM(s) Oral daily  atorvastatin 80 milliGRAM(s) Oral at bedtime  clopidogrel Tablet 75 milliGRAM(s) Oral daily  heparin   Injectable 5000 Unit(s) SubCutaneous every 8 hours  latanoprost 0.005% Ophthalmic Solution 1 Drop(s) Both EYES at bedtime  metoprolol succinate ER 25 milliGRAM(s) Oral daily  mirtazapine 15 milliGRAM(s) Oral at bedtime  polyethylene glycol 3350 17 Gram(s) Oral two times a day  tamsulosin 0.8 milliGRAM(s) Oral <User Schedule>  tamsulosin 0.4 milliGRAM(s) Oral <User Schedule>    MEDICATIONS  (PRN):  acetaminophen     Tablet .. 650 milliGRAM(s) Oral every 6 hours PRN Temp greater or equal to 38C (100.4F), Mild Pain (1 - 3)  aluminum hydroxide/magnesium hydroxide/simethicone Suspension 30 milliLiter(s) Oral every 4 hours PRN Dyspepsia  melatonin 3 milliGRAM(s) Oral at bedtime PRN Insomnia  ondansetron Injectable 4 milliGRAM(s) IV Push every 8 hours PRN Nausea and/or Vomiting      LABS: All Labs Reviewed:                RADIOLOGY/EKG (personally reviewed):  < from: 12 Lead ECG (05.26.24 @ 14:10) >  Diagnosis Line Sinus rhythm [id number] degree AV block with occasional premature ventricular complexes  Left axis deviation  Incomplete right bundle branch block  Anteroseptal infarct (cited on or before 11-MAY-2022)  Abnormal ECG  When compared with ECG of 22-MAY-2024 14:25,  No significant change was found  Confirmed by ALBIN COLON, JESSICA TABOR ([id number]) on 5/27/2024 8:14:20 AM    < end of copied text >  < from: Xray Chest 1 View- PORTABLE-Urgent (05.26.24 @ 14:52) >    IMPRESSION: Stable findings as above.    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    Overnight Events: None  Interval History: Patient was seen and examined by me this morning at bedside. No acute complaints. continues to feel better. Denies SOB. ambulated with O2sat in 96-99%    REVIEW OF SYSTEMS:  CONSTITUTIONAL: No weakness, fevers or chills  EYES/ENT: No visual changes;  No vertigo or throat pain   NECK: No pain or stiffness  RESPIRATORY: No cough, wheezing, hemoptysis; No shortness of breath  CARDIOVASCULAR: No chest pain or palpitations  GASTROINTESTINAL: No abdominal or epigastric pain. No nausea, vomiting; No diarrhea or constipation.   GENITOURINARY: No dysuria, frequency or hematuria  NEUROLOGICAL: No numbness or weakness  SKIN: No itching, burning, rashes, or lesions                 11.4                 136  | 20   | 81           7.07  >-----------< 161     ------------------------< 99                    33.7                 3.8  | 101  | 2.28                                         Ca 8.8   Mg x     Ph x      Urinalysis Basic - ( 27 May 2024 06:52 )    Color: x / Appearance: x / SG: x / pH: x  Gluc: 99 mg/dL / Ketone: x  / Bili: x / Urobili: x   Blood: x / Protein: x / Nitrite: x   Leuk Esterase: x / RBC: x / WBC x   Sq Epi: x / Non Sq Epi: x / Bacteria: x      PHYSICAL EXAM  Constitutional: Pt lying in bed, awake and alert, NAD  HEENT: EOMI, normocephalic, moist mucous membranes  Neck: Soft and supple  Respiratory: Decreased breath sound right lower lobe, No wheezing, rales or rhonchi  Cardiovascular: S1S2+, no M/G/R  Gastrointestinal: BS+, soft, NT, ND no guarding, no rebound   Extremities: No calf pain, no edema, bilateral varicosities  Vascular: Peripheral pulses present, b/l LE varicosities  Neurological: AAOx3, no focal deficits  Musculoskeletal: Normal muscle tone, no atrophy, no rigidity, no contractions    MEDICATIONS  (STANDING):  aspirin enteric coated 81 milliGRAM(s) Oral daily  atorvastatin 80 milliGRAM(s) Oral at bedtime  clopidogrel Tablet 75 milliGRAM(s) Oral daily  heparin   Injectable 5000 Unit(s) SubCutaneous every 8 hours  latanoprost 0.005% Ophthalmic Solution 1 Drop(s) Both EYES at bedtime  metoprolol succinate ER 25 milliGRAM(s) Oral daily  mirtazapine 15 milliGRAM(s) Oral at bedtime  polyethylene glycol 3350 17 Gram(s) Oral two times a day  tamsulosin 0.8 milliGRAM(s) Oral <User Schedule>  tamsulosin 0.4 milliGRAM(s) Oral <User Schedule>    MEDICATIONS  (PRN):  acetaminophen     Tablet .. 650 milliGRAM(s) Oral every 6 hours PRN Temp greater or equal to 38C (100.4F), Mild Pain (1 - 3)  aluminum hydroxide/magnesium hydroxide/simethicone Suspension 30 milliLiter(s) Oral every 4 hours PRN Dyspepsia  melatonin 3 milliGRAM(s) Oral at bedtime PRN Insomnia  ondansetron Injectable 4 milliGRAM(s) IV Push every 8 hours PRN Nausea and/or Vomiting      LABS: All Labs Reviewed:                RADIOLOGY/EKG (personally reviewed):  < from: 12 Lead ECG (05.26.24 @ 14:10) >  Diagnosis Line Sinus rhythm [id number] degree AV block with occasional premature ventricular complexes  Left axis deviation  Incomplete right bundle branch block  Anteroseptal infarct (cited on or before 11-MAY-2022)  Abnormal ECG  When compared with ECG of 22-MAY-2024 14:25,  No significant change was found  Confirmed by ALBIN COLON, JESSICA TABOR ([id number]) on 5/27/2024 8:14:20 AM    < end of copied text >  < from: Xray Chest 1 View- PORTABLE-Urgent (05.26.24 @ 14:52) >    IMPRESSION: Stable findings as above.    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    Overnight Events: None  Interval History: Patient was seen and examined by me this morning at bedside. No acute complaints. continues to feel better. Denies SOB. ambulated with O2sat in 96-99%    REVIEW OF SYSTEMS:  CONSTITUTIONAL: No weakness, fevers or chills  EYES/ENT: No visual changes;  No vertigo or throat pain   NECK: No pain or stiffness  RESPIRATORY: No cough, wheezing, hemoptysis; No shortness of breath  CARDIOVASCULAR: No chest pain or palpitations  GASTROINTESTINAL: No abdominal or epigastric pain. No nausea, vomiting; No diarrhea or constipation.   GENITOURINARY: No dysuria, frequency or hematuria  NEUROLOGICAL: No numbness or weakness  SKIN: No itching, burning, rashes, or lesions                 11.4                 136  | 20   | 81           7.07  >-----------< 161     ------------------------< 99                    33.7                 3.8  | 101  | 2.28                                         Ca 8.8   Mg x     Ph x      Urinalysis Basic - ( 27 May 2024 06:52 )    Color: x / Appearance: x / SG: x / pH: x  Gluc: 99 mg/dL / Ketone: x  / Bili: x / Urobili: x   Blood: x / Protein: x / Nitrite: x   Leuk Esterase: x / RBC: x / WBC x   Sq Epi: x / Non Sq Epi: x / Bacteria: x      PHYSICAL EXAM  Constitutional: Pt lying in bed, awake and alert, NAD  HEENT: EOMI, normocephalic, moist mucous membranes  Neck: Soft and supple  Respiratory: Decreased breath sound right lower lobe, No wheezing, rales or rhonchi  Cardiovascular: S1S2+, no M/G/R  Gastrointestinal: BS+, soft, NT, ND no guarding, no rebound   Extremities: No calf pain, no edema, bilateral varicosities  Vascular: Peripheral pulses present, b/l LE varicosities  Neurological: AAOx3, no focal deficits  Musculoskeletal: Normal muscle tone, no atrophy, no rigidity, no contractions    MEDICATIONS  (STANDING):  aspirin enteric coated 81 milliGRAM(s) Oral daily  atorvastatin 80 milliGRAM(s) Oral at bedtime  clopidogrel Tablet 75 milliGRAM(s) Oral daily  heparin   Injectable 5000 Unit(s) SubCutaneous every 8 hours  latanoprost 0.005% Ophthalmic Solution 1 Drop(s) Both EYES at bedtime  metoprolol succinate ER 25 milliGRAM(s) Oral daily  mirtazapine 15 milliGRAM(s) Oral at bedtime  polyethylene glycol 3350 17 Gram(s) Oral two times a day  tamsulosin 0.8 milliGRAM(s) Oral <User Schedule>  tamsulosin 0.4 milliGRAM(s) Oral <User Schedule>    MEDICATIONS  (PRN):  acetaminophen     Tablet .. 650 milliGRAM(s) Oral every 6 hours PRN Temp greater or equal to 38C (100.4F), Mild Pain (1 - 3)  aluminum hydroxide/magnesium hydroxide/simethicone Suspension 30 milliLiter(s) Oral every 4 hours PRN Dyspepsia  melatonin 3 milliGRAM(s) Oral at bedtime PRN Insomnia  ondansetron Injectable 4 milliGRAM(s) IV Push every 8 hours PRN Nausea and/or Vomiting      LABS: All Labs Reviewed:                11.6                 138  | 20   | 88           6.38  >-----------< 165     ------------------------< 98                    34.3                 3.9  | 102  | 2.35                                         Ca 8.9   Mg x     Ph x      Urinalysis Basic - ( 28 May 2024 06:47 )    Color: x / Appearance: x / SG: x / pH: x  Gluc: 98 mg/dL / Ketone: x  / Bili: x / Urobili: x   Blood: x / Protein: x / Nitrite: x   Leuk Esterase: x / RBC: x / WBC x   Sq Epi: x / Non Sq Epi: x / Bacteria: x               RADIOLOGY/EKG (personally reviewed):  < from: 12 Lead ECG (05.26.24 @ 14:10) >  Diagnosis Line Sinus rhythm [id number] degree AV block with occasional premature ventricular complexes  Left axis deviation  Incomplete right bundle branch block  Anteroseptal infarct (cited on or before 11-MAY-2022)  Abnormal ECG  When compared with ECG of 22-MAY-2024 14:25,  No significant change was found  Confirmed by ALBIN COLON, JESSICA TABOR ([id number]) on 5/27/2024 8:14:20 AM    < end of copied text >  < from: Xray Chest 1 View- PORTABLE-Urgent (05.26.24 @ 14:52) >    IMPRESSION: Stable findings as above.    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    Overnight Events: None  Interval History: Patient was seen and examined by me this morning at bedside. No acute complaints. continues to feel better. Denies SOB. ambulated with O2sat in 96-99%    REVIEW OF SYSTEMS:  CONSTITUTIONAL: No weakness, fevers or chills  EYES/ENT: No visual changes;  No vertigo or throat pain   NECK: No pain or stiffness  RESPIRATORY: No cough, wheezing, hemoptysis; No shortness of breath  CARDIOVASCULAR: No chest pain or palpitations    Vital Signs Last 24 Hrs  T(C): 36.6 (28 May 2024 13:19), Max: 36.6 (28 May 2024 06:40)  T(F): 97.8 (28 May 2024 13:19), Max: 97.8 (28 May 2024 06:40)  HR: 80 (28 May 2024 13:19) (64 - 85)  BP: 113/67 (28 May 2024 13:19) (102/66 - 122/81)  BP(mean): --  RR: 17 (28 May 2024 13:19) (16 - 19)  SpO2: 99% (28 May 2024 13:19) (93% - 99%)    Parameters below as of 28 May 2024 13:19  Patient On (Oxygen Delivery Method): room air    GASTROINTESTINAL: No abdominal or epigastric pain. No nausea, vomiting; No diarrhea or constipation.   GENITOURINARY: No dysuria, frequency or hematuria  NEUROLOGICAL: No numbness or weakness  SKIN: No itching, burning, rashes, or lesions       PHYSICAL EXAM  Constitutional: Pt lying in bed, awake and alert, NAD  HEENT: EOMI, normocephalic, moist mucous membranes  Neck: Soft and supple  Respiratory: Decreased breath sound right lower lobe, No wheezing, rales or rhonchi  Cardiovascular: S1S2+, no M/G/R  Gastrointestinal: BS+, soft, NT, ND no guarding, no rebound   Extremities: No calf pain, no edema, bilateral varicosities  Vascular: Peripheral pulses present, b/l LE varicosities  Neurological: AAOx3, no focal deficits  Musculoskeletal: Normal muscle tone, no atrophy, no rigidity, no contractions    MEDICATIONS  (STANDING):  aspirin enteric coated 81 milliGRAM(s) Oral daily  atorvastatin 80 milliGRAM(s) Oral at bedtime  clopidogrel Tablet 75 milliGRAM(s) Oral daily  heparin   Injectable 5000 Unit(s) SubCutaneous every 8 hours  latanoprost 0.005% Ophthalmic Solution 1 Drop(s) Both EYES at bedtime  metoprolol succinate ER 25 milliGRAM(s) Oral daily  mirtazapine 15 milliGRAM(s) Oral at bedtime  polyethylene glycol 3350 17 Gram(s) Oral two times a day  tamsulosin 0.8 milliGRAM(s) Oral <User Schedule>  tamsulosin 0.4 milliGRAM(s) Oral <User Schedule>    MEDICATIONS  (PRN):  acetaminophen     Tablet .. 650 milliGRAM(s) Oral every 6 hours PRN Temp greater or equal to 38C (100.4F), Mild Pain (1 - 3)  aluminum hydroxide/magnesium hydroxide/simethicone Suspension 30 milliLiter(s) Oral every 4 hours PRN Dyspepsia  melatonin 3 milliGRAM(s) Oral at bedtime PRN Insomnia  ondansetron Injectable 4 milliGRAM(s) IV Push every 8 hours PRN Nausea and/or Vomiting      LABS: All Labs Reviewed:                11.6                 138  | 20   | 88           6.38  >-----------< 165     ------------------------< 98                    34.3                 3.9  | 102  | 2.35                                         Ca 8.9   Mg x     Ph x      Urinalysis Basic - ( 28 May 2024 06:47 )    Color: x / Appearance: x / SG: x / pH: x  Gluc: 98 mg/dL / Ketone: x  / Bili: x / Urobili: x   Blood: x / Protein: x / Nitrite: x   Leuk Esterase: x / RBC: x / WBC x   Sq Epi: x / Non Sq Epi: x / Bacteria: x               RADIOLOGY/EKG (personally reviewed):  < from: 12 Lead ECG (05.26.24 @ 14:10) >  Diagnosis Line Sinus rhythm [id number] degree AV block with occasional premature ventricular complexes  Left axis deviation  Incomplete right bundle branch block  Anteroseptal infarct (cited on or before 11-MAY-2022)  Abnormal ECG  When compared with ECG of 22-MAY-2024 14:25,  No significant change was found  Confirmed by ALBIN COLON, JESSICA TABOR ([id number]) on 5/27/2024 8:14:20 AM    < end of copied text >  < from: Xray Chest 1 View- PORTABLE-Urgent (05.26.24 @ 14:52) >    IMPRESSION: Stable findings as above.    < end of copied text >

## 2024-05-28 NOTE — CONSULT NOTE ADULT - SUBJECTIVE AND OBJECTIVE BOX
ELYSE MALAVE,  79y Male  MRN: [id number]  ATTENDING: Dr. 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  Pt accompanied by his wife. Pt notes that after coming home yesterday from [facility], pt experienced tiredness, nausea and dry heaving at night. Pt endorses taking Bumex, metoprolol, plavix and ASA all together late this morning and started feeling dizziness, and worsening dyspnea associated with some weakness, causing him to BIBA to [facility]. In the ED, pt was found to be hypoxic sat 90% on RA and hypotensive.  Labs were notable for h&h 12.6/38.2, BUN/Cr 68/2.05, Alkphos 317, AST 76, BNP 5050. CXR showed cardiomegaly and peripheral vascular congestion. Pt was given fluids, one dose IV bumex.. RVP negative. (26 May 2024 16:10)      PAST MEDICAL & SURGICAL HISTORY:  BPH (benign prostatic hyperplasia)      Bipolar disorder      Depression      Anxiety      Umbilical hernia, incarcerated      Depression      Constipation      Urinary retention      CAD (coronary artery disease)      SCC (squamous cell carcinoma)      Lung mass      Other nonspecific abnormal finding of lung field      LV (left ventricular) mural thrombus      History of inguinal hernia      History of CHF (congestive heart failure)      History of arthroscopy of knee  Right, 1987      History of tonsillectomy  1952      History of hernia repair      H/O coronary angiogram          MEDICATION:  aspirin enteric coated 81 milliGRAM(s) Oral daily  atorvastatin 80 milliGRAM(s) Oral at bedtime  carvedilol 3.125 milliGRAM(s) Oral every 12 hours  clopidogrel Tablet 75 milliGRAM(s) Oral daily  FLUoxetine 20 milliGRAM(s) Oral <User Schedule>  latanoprost 0.005% Ophthalmic Solution 1 Drop(s) Both EYES at bedtime  mirtazapine 15 milliGRAM(s) Oral at bedtime      ALLERGIES:  No Known Allergies      FAMILY HISTORY:  Reviewed, non-contributory: [ ]   Maternal-  Paternal-    SOCIAL HISTORY:  Tobacco: YES [ ]  ; NO [ ]; Former smoker [ ]  Alcohol:   YES [ ]  ; NO [ ]; Social alcohol user [ ]  Occupation/ marital status/ children:    REVIEW SYSTEMS:  Constitutional: no fever, chills, night sweats, no weight loss  HEENT: denies visual changes; no oral ulcers, dysphagia, no epistaxis;   Respiratory: no dyspnea , wheezing, cough, hemoptysis  Cardiovascular: denies acute chest pain, palpitations  GI: no loss of appetite, dark stools, or abdominal tenderness / pain; no change in bowel habits.  Musculoskeletal: no new back pain, bone/ joint pain ,no extremity swelling  Integumentary: denies pruritus; no skin rash, bruises, no  suspicious skin lesions  Neurologic: denies peripheral numbness, no dizziness, no gait problems.  Heme: no reported easy bruisability; no lymph node enlargement    VITALS:  T(C): 36.6, Max: 36.6 (05-28-24 @ 06:40)  T(F): 97.8, Max: 97.8 (05-28-24 @ 06:40)  HR: 70 (70 - 81)  BP: 104/63 (104/63 - 122/81)  SpO2: 100% (93% - 100%)    PHYSICAL EXAM:  Constitutional: alert, well developed  HEENT: normocephalic, anicteric sclerae, no mucositis or thrush  Respiratory: bilateral clear to auscultation anteriorly  Cardiovascular : S1, S2 regular, rhythmic, no murmurs, gallops or rubs  Abdomen: soft, distended, + normoactive BS, no palpable HS- megaly  Extremities: no tenderness;  -c/c/e, pulses equal bilaterally  Integumentary: no rashes, scars, or lesions suggestive of malignancy  Neurologic: no gross focal deficits    LABS:  (05-28) WBC: 6.38 K/uL,Hemoglobin: 11.6 g/dL, Hematocrit: 34.3 %,    Platelet: 165 K/uL    (05-28) Na: 138 mmol/L ; K: 3.9 mmol/L ; BUN: 88 mg/dL ; Cr: 2.35 mg/dL.          RADIOLOGY:               ELYSE MALAVE,  79y Male  MRN: [id number]  ATTENDING: Dr. 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  CT chest in early April 2024 showed left upper lobe nodule within the radiation field of more rounded contour, gradually becoming more discrete compared to prior studies.  Oncology consultation requested as above.    PAST MEDICAL & SURGICAL HISTORY:  BPH (benign prostatic hyperplasia)  Bipolar disorder  Depression  Anxiety  Umbilical hernia, incarcerated  Depression  Constipation  Urinary retention  CAD (coronary artery disease)  SCC (squamous cell carcinoma)  Lung mass  Other nonspecific abnormal finding of lung field  LV (left ventricular) mural thrombus  History of inguinal hernia  History of CHF (congestive heart failure)  History of arthroscopy of knee-Right, 1987  History of tonsillectomy -1952  History of hernia repair  H/O coronary angiogram    MEDICATION:  aspirin enteric coated 81 milliGRAM(s) Oral daily  atorvastatin 80 milliGRAM(s) Oral at bedtime  carvedilol 3.125 milliGRAM(s) Oral every 12 hours  clopidogrel Tablet 75 milliGRAM(s) Oral daily  FLUoxetine 20 milliGRAM(s) Oral <User Schedule>  latanoprost 0.005% Ophthalmic Solution 1 Drop(s) Both EYES at bedtime  mirtazapine 15 milliGRAM(s) Oral at bedtime    ALLERGIES:  No Known Allergies    FAMILY HISTORY:  Reviewed, non-contributory: [x ]     SOCIAL HISTORY:  Tobacco: YES [ ]  ; NO [ ]; Former smoker [x ]  Alcohol:   YES [ ]  ; NO [ x]; Social alcohol user [ ]    REVIEW SYSTEMS:  Constitutional: no fever, chills, night sweats, no weight loss  HEENT: denies visual changes; no oral ulcers, dysphagia, no epistaxis;   Respiratory: no dyspnea , wheezing, cough, hemoptysis  Cardiovascular: denies acute chest pain, palpitations  GI: no loss of appetite, dark stools, or abdominal tenderness / pain; no change in bowel habits.  Musculoskeletal: no new back pain, bone/ joint pain ,no extremity swelling  Integumentary: denies pruritus; no skin rash, bruises, no  suspicious skin lesions  Neurologic: denies peripheral numbness, no dizziness, no gait problems.  Heme: no reported easy bruisability; no lymph node enlargement    VITALS:  T(C): 36.6, Max: 36.6 (05-28-24 @ 06:40)  T(F): 97.8, Max: 97.8 (05-28-24 @ 06:40)  HR: 70 (70 - 81)  BP: 104/63 (104/63 - 122/81)  SpO2: 100% (93% - 100%)    PHYSICAL EXAM:  Constitutional: alert, well developed  HEENT: normocephalic, anicteric sclerae, no mucositis or thrush  Respiratory: bilateral clear to auscultation anteriorly  Cardiovascular : S1, S2 regular, rhythmic, no murmurs, gallops or rubs  Abdomen: soft, distended, + normoactive BS, no palpable HS- megaly  Extremities: no tenderness;  -c/c/e, pulses equal bilaterally  Integumentary: no rashes, scars, or lesions suggestive of malignancy  Neurologic: no gross focal deficits    LABS:  (05-28) WBC: 6.38 K/uL,Hemoglobin: 11.6 g/dL, Hematocrit: 34.3 %,  Platelet: 165 K/uL  (05-28) Na: 138 mmol/L ; K: 3.9 mmol/L ; BUN: 88 mg/dL ; Cr: 2.35 mg/dL.    RADIOLOGY:  ACC: [id number] EXAM:  CT CHEST   ORDERED BY: JASWINDER DUNBAR     PROCEDURE DATE:  04/01/2024          INTERPRETATION:  INDICATION: History of lung cancer treated with   stereotactic radiation to the left upper lobe in early 2023,   indeterminate findings on recent chest CT    TECHNIQUE: Helical acquisition images of the chest without intravenous   contrast. Maximum intensity projection images were generated.    COMPARISON: Most recent exam of PET/CT 1/14/2024.    FINDINGS:    LUNGS/AIRWAYS/PLEURA: Overall similar size of left lobe 2.3 cm solid   nodule within the radiation field, although now has a more rounded   contour (3-46), gradually becoming more discrete over multiple prior   studies. Right upper lobectomy. Unchanged mild indistinct groundglass in   the superior aspect of the right middle lobe (3-43). No pleural effusion.    LYMPH NODES/MEDIASTINUM: No lymphadenopathy.    HEART/VASCULATURE: Enlarged heart. No pericardial effusion. Coronary   artery calcifications. Normal caliber aorta.    UPPER ABDOMEN: Cholelithiasis. Unchanged subcentimeter hypodensity in the   liver, too small to characterize. Unchanged left adrenal nodule, likely   an adenoma. Partially included right renal cyst.    BONES/SOFT TISSUES: Unremarkable.        IMPRESSION:    Left upper lobe nodule within the radiation field has a more rounded   contour, and gradually become more discrete over multiple prior studies.   Neoplasm should be considered.

## 2024-05-28 NOTE — PROGRESS NOTE ADULT - ASSESSMENT
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% (Echo 4/2024), valvular disease (mild-moderate MR, moderate TR), CKD, anxiety/depression/bipolar disorder, history of lung cancer s/p RUL lobectomy (9/2022) with recurrent disease s/p radiation to left lung (completed 3/2023), recently admitted 4/6-4/10 for dizziness felt to be due to overdiuresis, who presents from home for weight gain, increased abdominal girth, and OGLESBY, in observation for:    #Acute Decompensated Heart Failure  #History of Combined Systolic and Diastolic HF with EF 25-30% (Echo 4/2024)  #History of CAD/Valvular Disease (Mild-Mod MR, Moderate TR)  Clinically Euvolemic  TTE 4/2024: EF 25-30%, grade1 diastolic dysfunction, Mild-moderate MR, Moderate TR  -cardiology consult apprec  -Palliative care consult  -Daily weight. Strict in's and out's, telemetry  -hold ARNI, mineralocorticoid, SGLT2 due to renal function - may resume as outpatient, renal function dependent  -c/w aspirin, plavix  -c/w statin  -switch toprol 25mg qd to coreg 3.125 BID  -switch bumex 2mg qd to 1mg BID    #BRODY on CKD stage III 2/2 possible cardio-renal syndrome  Baseline Cr ~1.3-1.5 range  Cr: 2.28  -Nephrology Consult apprec  -Avoid nephrotoxic agents  -hold off on ARNI, mineralocorticoid, SGLT2   -Monitor BMP    #Elevated T bili/Alk Phos/mild transaminitis  He has no abdominal pain, possibly due to hepatic congestion  Elevated GGT, elevated direct bili  RUQ US with gallstone  CTAP: Cholelithiasis with  nonspecific gallbladder wall thickening. Correlate   with symptomatology right upper quadrant ultrasound, Small ascites.  CTAP with contrast unable to be performed to do renal function  -GI consult apprec: likely 2/2 hepatic congestion  -Monitor CMP    #Anxiety/Depression/Bipolar Disorder  -Continue prozac, mirtazapine    #BPH  -stop flomax due to hypotension    #History of Lung Cancer  #HARRY Lung Nodule  -s/p RUL lobectomy (9/2022) with recurrent disease s/p radiation to left lung (completed 3/2023)  -Recent PET showed The LEFT lung nodule does appear increasingly rounded solid, and although the degree of uptake there is essentially unchanged. The RIGHT lung appears to have slowly become denser over time, with the appearance of uptake there are also appearing slightly more prominent.   -Follows with Dr. Alejandre for surveillance    #DVT PPx  -Heparin SC    #FULL CODE    Dispo: Home when optimized  Diet: DASH    Discussed with Dr. 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% (Echo 4/2024), valvular disease (mild-moderate MR, moderate TR), CKD, anxiety/depression/bipolar disorder, history of lung cancer s/p RUL lobectomy (9/2022) with recurrent disease s/p radiation to left lung (completed 3/2023), recently admitted 4/6-4/10 for dizziness felt to be due to overdiuresis, who presents from home for weight gain, increased abdominal girth, and OGLESBY, in observation for:    #Acute Decompensated Heart Failure  #History of Combined Systolic and Diastolic HF with EF 25-30% (Echo 4/2024)  #History of CAD/Valvular Disease (Mild-Mod MR, Moderate TR)  Clinically Euvolemic  TTE 4/2024: EF 25-30%, grade1 diastolic dysfunction, Mild-moderate MR, Moderate TR  -cardiology consult apprec  -Palliative care consult  -Daily weight. Strict in's and out's, telemetry  -hold ARNI, mineralocorticoid antagonist, SGLT2 due to renal function - may resume as outpatient, renal function dependent  -c/w aspirin, plavix  -c/w statin  -c/w coreg 3.125 BID  -c/w bumex 1mg BID    #BRODY on CKD stage III 2/2 possible cardio-renal syndrome  Baseline Cr ~1.3-1.5 range  -Nephrology Consult apprec  -Avoid nephrotoxic agents  -hold off on ARNI, mineralocorticoid antag, SGLT2   -Monitor BMP    #Elevated T bili/Alk Phos/mild transaminitis  He has no abdominal pain, possibly due to hepatic congestion  Elevated GGT, elevated direct bili  CTAP: Cholelithiasis with  nonspecific gallbladder wall thickening, Small ascites.  CTAP with contrast unable to be performed to do renal function  -GI consult apprec: likely 2/2 hepatic congestion  -Monitor CMP    #elevated monocytes  #elevated kappa gamma light chains  #normocytic anemia  ?MGUS  -f/u free light chains  -f/u protein electrophoresis  -f/u iron studies    #Anxiety/Depression/Bipolar Disorder  -Continue prozac, mirtazapine    #BPH  -stop flomax due to hypotension    #History of Lung Cancer  #HARRY Lung Nodule  -s/p RUL lobectomy (9/2022) with recurrent disease s/p radiation to left lung (completed 3/2023)  -Recent PET showed The LEFT lung nodule does appear increasingly rounded solid, and although the degree of uptake there is essentially unchanged. The RIGHT lung appears to have slowly become denser over time, with the appearance of uptake there are also appearing slightly more prominent.   -Follows with Dr. Alejandre for surveillance    #DVT PPx  -Heparin SC    #FULL CODE    Dispo: Home when optimized  Diet: DASH    Discussed with Dr. 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% (Echo 4/2024), valvular disease (mild-moderate MR, moderate TR), CKD, anxiety/depression/bipolar disorder, history of lung cancer s/p RUL lobectomy (9/2022) with recurrent disease s/p radiation to left lung (completed 3/2023), recently admitted 4/6-4/10 for dizziness felt to be due to overdiuresis, who presents from home for weight gain, increased abdominal girth, and OGLESBY, in observation for:    #Acute Decompensated Heart Failure  #History of Combined Systolic and Diastolic HF with EF 25-30% (Echo 4/2024)  #History of CAD/Valvular Disease (Mild-Mod MR, Moderate TR)  Clinically Euvolemic  TTE 4/2024: EF 25-30%, grade1 diastolic dysfunction, Mild-moderate MR, Moderate TR  -cardiology consult apprec  -Palliative care consult  -Daily weight. Strict in's and out's, telemetry  -hold ARNI, mineralocorticoid antagonist, SGLT2 due to renal function - may resume as outpatient, renal function dependent  -c/w aspirin, plavix  -c/w statin  -c/w coreg 3.125 BID  -c/w bumex 1mg BID    #BRODY on CKD stage III 2/2 possible cardio-renal syndrome  Baseline Cr ~1.3-1.5 range  -Nephrology Consult apprec  -Avoid nephrotoxic agents  -hold off on ARNI, mineralocorticoid antag, SGLT2   -Monitor BMP    #Elevated T bili/Alk Phos/mild transaminitis  He has no abdominal pain, possibly due to hepatic congestion  Elevated GGT, elevated direct bili  CTAP: Cholelithiasis with  nonspecific gallbladder wall thickening, Small ascites.  CTAP with contrast unable to be performed to do renal function  -GI consult apprec: likely 2/2 hepatic congestion  -Monitor CMP    #elevated monocytes  #elevated kappa/Lambda gamma light chains  #normocytic anemia  ?MGUS  -f/u protein electrophoresis  -f/u iron studies  -f/u with blood smear (slide ready for review at the lab)    #Anxiety/Depression/Bipolar Disorder  -Continue prozac, mirtazapine    #BPH  -stop flomax due to hypotension    #History of Lung Cancer  #HARRY Lung Nodule  -s/p RUL lobectomy (9/2022) with recurrent disease s/p radiation to left lung (completed 3/2023)  -Recent PET showed The LEFT lung nodule does appear increasingly rounded solid, and although the degree of uptake there is essentially unchanged. The RIGHT lung appears to have slowly become denser over time, with the appearance of uptake there are also appearing slightly more prominent.   -Follows with Dr. Alejandre for surveillance    #DVT PPx  -Heparin SC    #FULL CODE    Dispo: Home when optimized  Diet: DASH    Discussed with Dr. 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% (Echo 4/2024), valvular disease (mild-moderate MR, moderate TR), CKD, anxiety/depression/bipolar disorder, history of lung cancer s/p RUL lobectomy (9/2022) with recurrent disease s/p radiation to left lung (completed 3/2023), recently admitted 4/6-4/10 for dizziness felt to be due to overdiuresis, who presents from home for weight gain, increased abdominal girth, and OGLESBY, in observation for:    #Acute Decompensated Heart Failure  #History of Combined Systolic and Diastolic HF with EF 25-30% (Echo 4/2024)  #History of CAD/Valvular Disease (Mild-Mod MR, Moderate TR)  Clinically Euvolemic  TTE 4/2024: EF 25-30%, grade1 diastolic dysfunction, Mild-moderate MR, Moderate TR  -cardiology consult apprec  -Palliative care consult  -Daily weight. Strict in's and out's, telemetry  -hold ARNI, mineralocorticoid antagonist, SGLT2 due to renal function - may resume as outpatient, renal function dependent  -c/w aspirin, plavix  -c/w statin  -c/w coreg 3.125 BID  -HOLD bumex 1mg BID due to BRODY    #BRODY on CKD stage III 2/2 possible cardio-renal syndrome  Baseline Cr ~1.3-1.5 range  -Nephrology Consult apprec  -Avoid nephrotoxic agents  -hold off on ARNI, mineralocorticoid antag, SGLT2   -HOLD bumex  -Monitor BMP    #Elevated T bili/Alk Phos/mild transaminitis  He has no abdominal pain, possibly due to hepatic congestion  Elevated GGT, elevated direct bili  CTAP: Cholelithiasis with  nonspecific gallbladder wall thickening, Small ascites.  CTAP with contrast unable to be performed to do renal function  -GI consult apprec: likely 2/2 hepatic congestion  -Monitor CMP    #elevated monocytes  #elevated kappa/Lambda gamma light chains  #normocytic anemia  ?MGUS, possible contribution to kidney function  -Hematology consult  -f/u protein electrophoresis  -f/u iron studies  -f/u with blood smear (slide ready for review at the lab)    #Anxiety/Depression/Bipolar Disorder  -Continue prozac, mirtazapine    #BPH  -stop flomax due to hypotension    #History of Lung Cancer  #HARRY Lung Nodule  -s/p RUL lobectomy (9/2022) with recurrent disease s/p radiation to left lung (completed 3/2023)  -Recent PET showed The LEFT lung nodule does appear increasingly rounded solid, and although the degree of uptake there is essentially unchanged. The RIGHT lung appears to have slowly become denser over time, with the appearance of uptake there are also appearing slightly more prominent.   -Follows with Dr. Alejandre for surveillance    #DVT PPx  -Heparin SC    Dispo: Home when optimized  Diet: DASH  FULL CODE, Palliative consult    Discussed with Dr. 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% (Echo 4/2024), valvular disease (mild-moderate MR, moderate TR), CKD, anxiety/depression/bipolar disorder, history of lung cancer s/p RUL lobectomy (9/2022) with recurrent disease s/p radiation to left lung (completed 3/2023), recently admitted 4/6-4/10 for dizziness felt to be due to overdiuresis, who presents from home for weight gain, increased abdominal girth, and OGLESBY, in observation for:    #Acute Decompensated Heart Failure  #History of Combined Systolic and Diastolic HF with EF 25-30% (Echo 4/2024)  #History of CAD/Valvular Disease (Mild-Mod MR, Moderate TR)  Clinically Euvolemic  TTE 4/2024: EF 25-30%, grade1 diastolic dysfunction, Mild-moderate MR, Moderate TR  -cardiology consult apprec  -Palliative care consult  -Daily weight. Strict in's and out's, telemetry  -hold ARNI, mineralocorticoid antagonist, SGLT2 due to renal function - may resume as outpatient, renal function dependent  -c/w aspirin, plavix  -c/w statin  -c/w coreg 3.125 BID  -HOLD bumex 1mg BID due to BRODY  -f/u VQ scan and Xray    #BRODY on CKD stage III 2/2 possible cardio-renal syndrome  Baseline Cr ~1.3-1.5 range  -Nephrology Consult apprec  -Avoid nephrotoxic agents  -hold off on ARNI, mineralocorticoid antag, SGLT2   -HOLD bumex  -Monitor BMP    #Elevated T bili/Alk Phos/mild transaminitis  He has no abdominal pain, possibly due to hepatic congestion  Elevated GGT, elevated direct bili  CTAP: Cholelithiasis with  nonspecific gallbladder wall thickening, Small ascites.  CTAP with contrast unable to be performed to do renal function  -GI consult apprec: likely 2/2 hepatic congestion  -Monitor CMP    #elevated monocytes  #elevated kappa/Lambda gamma light chains  #normocytic anemia  ?MGUS, possible contribution to kidney function  -Hematology consult  -f/u protein electrophoresis  -f/u iron studies  -f/u with blood smear (slide ready for review at the lab)    #Anxiety/Depression/Bipolar Disorder  -Continue prozac, mirtazapine    #BPH  -stop flomax due to hypotension    #History of Lung Cancer  #HARRY Lung Nodule  -s/p RUL lobectomy (9/2022) with recurrent disease s/p radiation to left lung (completed 3/2023)  -Recent PET showed The LEFT lung nodule does appear increasingly rounded solid, and although the degree of uptake there is essentially unchanged. The RIGHT lung appears to have slowly become denser over time, with the appearance of uptake there are also appearing slightly more prominent.   -Follows with Dr. Alejandre for surveillance    #DVT PPx  -Heparin SC    Dispo: Home when optimized  Diet: DASH  FULL CODE, Palliative consult    Discussed with Dr. Sheehan

## 2024-05-28 NOTE — CONSULT NOTE ADULT - PROBLEM SELECTOR RECOMMENDATION 9
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  Additionally new right pleural effusion noted.  Treated with palliative RT, no systemic chemotherapy.  Recent staging scans raising the possibility of a metastatic pulmonary neoplasm.  Patient needs histologic diagnosis; will follow-up with oncology in ambulatory.  Discharge planning in progress.

## 2024-05-28 NOTE — CONSULT NOTE ADULT - SUBJECTIVE AND OBJECTIVE BOX
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  Pt accompanied by his wife. Pt notes that after coming home yesterday from [facility], pt experienced tiredness, nausea and dry heaving at night. Pt endorses taking Bumex, metoprolol, plavix and ASA all together late this morning and started feeling dizziness, and worsening dyspnea associated with some weakness, causing him to BIBA to [facility]. In the ED, pt was found to be hypoxic sat 90% on RA and hypotensive.  Labs were notable for h&h 12.6/38.2, BUN/Cr 68/2.05, Alkphos 317, AST 76, BNP 5050. CXR showed cardiomegaly and peripheral vascular congestion. Pt was given fluids, one dose IV bumex.. RVP negative. (26 May 2024 16:10)    Pt readmitted this past weekend 2/2 HF; palliative care c/s for [facility]. Pt seen and examined at bedside this morning. He was sleepy this morning but answering questions. He said last night was the first time he slept well in days; pt noted to get valium last night. Denies CP but reports SOB when walking around.     PERTINENT PM/SXH:   BPH (benign prostatic hyperplasia)    Bipolar disorder    Depression    Anxiety    Umbilical hernia, incarcerated    Inguinal hernia of right side without obstruction or gangrene    Depression    Constipation    Urinary retention    CAD (coronary artery disease)    SCC (squamous cell carcinoma)    Lung mass    Other nonspecific abnormal finding of lung field    LV (left ventricular) mural thrombus    History of inguinal hernia    Severe left ventricular systolic dysfunction    History of CHF (congestive heart failure)      Anxiety    Depression    BPH (benign prostatic hyperplasia)    History of arthroscopy of knee    History of tonsillectomy    History of hernia repair    H/O coronary angiogram      FAMILY HISTORY:  Family history of depression (Mother)    FH: CAD (coronary artery disease) (Sibling, Sibling)    Family history of diabetes mellitus (DM) (Sibling)      Family Hx substance abuse [ ]yes [ ]no  ITEMS NOT CHECKED ARE NOT PRESENT    SOCIAL HISTORY:   Significant other/partner[ X]  Children[ ]  Pentecostal/Spirituality: Shinto   Substance hx:  none   Tobacco hx:  none   Alcohol hx: none  Living Situation: [ ]Home       ADVANCE DIRECTIVES:    DNR/MOLST  - no - FULL CODE  DECISION MAKER(s):   Surrogate(s)  wife  Name(s): Phone Number(s): Kenia Becerra [phone]    BASELINE (I)ADL(s) (prior to admission):  Emmons: Total       Allergies  No Known Allergies    Intolerances    MEDICATIONS  (STANDING):  aspirin enteric coated 81 milliGRAM(s) Oral daily  atorvastatin 80 milliGRAM(s) Oral at bedtime  carvedilol 3.125 milliGRAM(s) Oral every 12 hours  clopidogrel Tablet 75 milliGRAM(s) Oral daily  FLUoxetine 20 milliGRAM(s) Oral <User Schedule>  latanoprost 0.005% Ophthalmic Solution 1 Drop(s) Both EYES at bedtime  mirtazapine 15 milliGRAM(s) Oral at bedtime    MEDICATIONS  (PRN):  acetaminophen     Tablet .. 650 milliGRAM(s) Oral every 6 hours PRN Temp greater or equal to 38C (100.4F), Mild Pain (1 - 3)  aluminum hydroxide/magnesium hydroxide/simethicone Suspension 30 milliLiter(s) Oral every 4 hours PRN Dyspepsia  melatonin 3 milliGRAM(s) Oral at bedtime PRN Insomnia  ondansetron Injectable 4 milliGRAM(s) IV Push every 8 hours PRN Nausea and/or Vomiting  temazepam 15 milliGRAM(s) Oral at bedtime PRN Insomnia    PRESENT SYMPTOMS:     Pain:  [X ]no  QOL impact -   Location -                    Aggravating factors -  Quality -  Radiation -  Timing-  Severity (0-10 scale):  Minimal acceptable level (0-10 scale):       Dyspnea:                         Mild    Anxiety:                            does not report  Depressed:                  does not report  Fatigue:                            Mild   Nausea:                           none  Loss of appetite:           none  Constipation:                   none      Other Symptoms:  [X ]All other review of systems negative       Chaplaincy Referral:   Deferred   Palliative Performance Status Version 2:       60  %    [email or web address]    PHYSICAL EXAM:  Vital Signs Last 24 Hrs  T(C): 36.6 (28 May 2024 06:40), Max: 36.6 (28 May 2024 06:40)  T(F): 97.8 (28 May 2024 06:40), Max: 97.8 (28 May 2024 06:40)  HR: 81 (28 May 2024 09:09) (64 - 87)  BP: 107/64 (28 May 2024 09:09) (102/66 - 122/81)  BP(mean): --  RR: 18 (28 May 2024 09:09) (16 - 19)  SpO2: 95% (28 May 2024 09:09) (93% - 96%)    Parameters below as of 28 May 2024 09:09  Patient On (Oxygen Delivery Method): room air     I&O's Summary    GENERAL: sleepy male laying in bed in NAD  HEENT: NC/AT, MMM  PULMONARY:  CTAB, no wheezing noted  CARDIOVASCULAR:  RRR, no murmur noted  GASTROINTESTINAL: soft nontender  Last BM:  MUSCULOSKELETAL: no edema to BLE; moves all ext  NEUROLOGIC: No focal deficits.   A&Ox3       LABS:                        11.6   6.38  )-----------( 165      ( 28 May 2024 06:47 )             34.3   05-28    138  |  102  |  88<H>  ----------------------------<  98  3.9   |  20<L>  |  2.35<H>    Ca    8.9      28 May 2024 06:47    TPro  7.3  /  Alb  3.2<L>  /  TBili  2.9<H>  /  DBili  x   /  AST  115<H>  /  ALT  73<H>  /  AlkPhos  372<H>  05-28      Urinalysis Basic - ( 28 May 2024 06:47 )    Color: x / Appearance: x / SG: x / pH: x  Gluc: 98 mg/dL / Ketone: x  / Bili: x / Urobili: x   Blood: x / Protein: x / Nitrite: x   Leuk Esterase: x / RBC: x / WBC x   Sq Epi: x / Non Sq Epi: x / Bacteria: x      RADIOLOGY & ADDITIONAL STUDIES:      PROCEDURE DATE:  05/26/2024          INTERPRETATION:  AP semierect chest on May 26, 2024 2:25 PM. Patient is   short of breath.    Heart enlargement again noted.    There is persistent loculated effusion with mild scarring at the right   base.    Chest is similar to May 22.    IMPRESSION: Stable findings as above.    --- End of Report ---        PROTEIN CALORIE MALNUTRITION PRESENT: [ ]mild [ ]moderate [ ]severe [ ]underweight [ ]morbid obesity  https://www.andeal.org/vault/2440/web/files/ONC/Table_Clinical%20Characteristics%20to%20Document%20Malnutrition-White%20JV%20et%20al%202012.pdf    Height (cm): 182.9 (05-28-24 @ 06:36), 182.9 (05-25-24 @ 11:08), 180.3 (04-19-24 @ 14:30)  Weight (kg): 84.4 (05-28-24 @ 06:36), 82.6 (05-25-24 @ 11:08), 80.7 (04-19-24 @ 14:30)  BMI (kg/m2): 25.2 (05-28-24 @ 06:36), 24.7 (05-25-24 @ 11:08), 24.8 (04-19-24 @ 14:30)    [ ]PPSV2 < or = to 30% [ ]significant weight loss  [ ]poor nutritional intake  [ ]anasarca[ ]Artificial Nutrition      Other REFERRALS:  [ ]Hospice  [ ]Child Life  [ ]Social Work  [ ]Case management [ ]Holistic Therapy

## 2024-05-28 NOTE — CONSULT NOTE ADULT - ASSESSMENT
79-year-old man admitted for observation due to recurrent symptoms of shortness of breath, nausea, vomiting, weakness and fatigue.  He has cardiac history of combined systolic and diastolic heart failure with low ejection fraction, valvular heart disease, follows with heart failure team at [place].  Medical issues include CKD, anxiety/depression, bipolar disorder, history of lung cancer status post lobectomy, elevated LFTs and jaundice.  Status post recent hospital admission and discharge.  He was initially noted to be hypoxic on room air with low blood pressure.  On physical examination today, blood pressure is low normal.  There is jugular venous distention, suggestive of fluid overload.    Recommendations  -Agree with oral diuresis.  -Consider rechallenging with low-dose beta-blocker, consider carvedilol 3.125 mg twice daily.  -Entresto, spironolactone and SGLT2 inhibitor currently on hold due to renal insufficiency.  -Consider holding tamsulosin ( alpha blocker that may exacerbate hypotension)   -Continue to monitor labs and electrolytes.  -He should follow-up with heart failure team, consideration to CardioMEMS and prophylactic ICD.  -Discussed with patient and with primary team.      
Assessment  1. Dyspnea on exertion in patient with Systolic CHF  2. Increased LFTs and BRODY on CKD unsure of exact cause, Right heart dysfunction vs GB/Liver needs to be considerer  3. Never smoker with history of Lung cancer- now with upper lobe Nodule increasing rounded/solid with uptake on PET with ill defined right lower lobe with new focal uptake malignant involvement not excluded  4. Underlying BPH, Bipolar d/o, Depression, Anxiety, CAD (coronary artery disease)    Plan  Taper n/c o2 to off, sat 99% on 2 L when seen  GI f/u to see if role in repeat US RUQ  hold diuretics  with h/o malignancy and at risk for re occurrence would r/o VTE check VQ scan  can consider albuterol for SOB, unsure if will help  d/w Dr. Sheehan       On D/c need to f/u for Que Alejandre for eval for above new lesions
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     Acute Decompensated Heart Failure  - History of Combined Systolic and Diastolic HF with EF 25-30% (Echo 4/2024)  - History of CAD/Valvular Disease (Mild-Mod MR, Moderate TR)  - cardio following: The patient would be a good candidate for CardioMEMS given recurrent CHF hospitalizations and difficulty with volume management as outpatient, message was sent to his CHF cardiologist, Dr. Jay Shell. May also require ICD if EF remains low on GDMT.  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 The RIGHT lung appears to have slowly become denser over time, with the appearance of uptake there are also appearing slightly more prominent.   -Follows with Dr. Alejandre for surveillance  - Agree with hematology consult    Advanced care planning  - Surrogate: Kenia Becerra  - see GOC note above, Pt is FULL CODE    Encounter for Palliative Care  - Introduced the palliative care team to pt at bedside, see GOC note above. Discussed case with Dr. Sheehan.  - Will continue to follow for ongoing GOC conversations and supportive care.

## 2024-05-28 NOTE — PHYSICAL THERAPY INITIAL EVALUATION ADULT - ADDITIONAL COMMENTS
Pt lives with wife in private home, 3 [address], flight of stairs to study but can stay on first floor.  Ambulates with RW at night, has transport w/c

## 2024-05-28 NOTE — PROGRESS NOTE ADULT - SUBJECTIVE AND OBJECTIVE BOX
ELYSE MALAVE  [id number]      Chief Complaint:      Interval History:      Tele:        acetaminophen     Tablet .. 650 milliGRAM(s) Oral every 6 hours PRN  aluminum hydroxide/magnesium hydroxide/simethicone Suspension 30 milliLiter(s) Oral every 4 hours PRN  aspirin enteric coated 81 milliGRAM(s) Oral daily  atorvastatin 80 milliGRAM(s) Oral at bedtime  buMETAnide 1 milliGRAM(s) Oral two times a day  carvedilol 3.125 milliGRAM(s) Oral every 12 hours  clopidogrel Tablet 75 milliGRAM(s) Oral daily  diazepam    Tablet 2 milliGRAM(s) Oral daily PRN  FLUoxetine 20 milliGRAM(s) Oral <User Schedule>  latanoprost 0.005% Ophthalmic Solution 1 Drop(s) Both EYES at bedtime  melatonin 3 milliGRAM(s) Oral at bedtime PRN  mirtazapine 15 milliGRAM(s) Oral at bedtime  ondansetron Injectable 4 milliGRAM(s) IV Push every 8 hours PRN  temazepam 15 milliGRAM(s) Oral at bedtime PRN      Objective:     Vital Signs:  T(C): 36.6 (05-28-24 @ 06:40), Max: 36.6 (05-28-24 @ 06:40)  HR: 81 (05-28-24 @ 09:09) (64 - 87)  BP: 107/64 (05-28-24 @ 09:09) (102/66 - 122/81)  RR: 18 (05-28-24 @ 09:09) (16 - 19)  SpO2: 95% (05-28-24 @ 09:09) (93% - 96%)  Wt(kg): --    Physical Exam:   General: fatigued  HEENT: sclera anicteric  Neck: supple  CVS: JVP ~ 7 cm H20, RRR, s1, s2, systolic murmur  Chest: unlabored respirations, CTAB  Abdomen: non-distended  Extremities: no lower extremity edema b/l  Neuro: awake, alert & oriented  Psych: normal affect      Labs:   28 May 2024 06:47    138    |  102    |  88     ----------------------------<  98     3.9     |  20     |  2.35     Ca    8.9        28 May 2024 06:47    TPro  7.3    /  Alb  3.2    /  TBili  2.9    /  DBili  x      /  AST  115    /  ALT  73     /  AlkPhos  372    28 May 2024 06:47                          11.6   6.38  )-----------( 165      ( 28 May 2024 06:47 )             34.3           TTE (4/7/24):  LVEF 25-30%, mild LVH  Mildly enlarged right ventricle.  Mild-moderate MR, Moderate TR, Mild AR, Mild IN    ECG (5/26/24): sinus rhythm, first degree AV block, PVC, incomplete RBBB, anteroseptal infarct (similar to prior ECG)     ELYSE MALAVE  [id number]      Chief Complaint: Nausea/Vomiting/Cholelithiasis/History of cardiomyopathy     Interval History: The patient reports feeling better now, breathing is stable.     Tele: sinus rhythm       Current meds:   acetaminophen     Tablet .. 650 milliGRAM(s) Oral every 6 hours PRN  aluminum hydroxide/magnesium hydroxide/simethicone Suspension 30 milliLiter(s) Oral every 4 hours PRN  aspirin enteric coated 81 milliGRAM(s) Oral daily  atorvastatin 80 milliGRAM(s) Oral at bedtime  buMETAnide 1 milliGRAM(s) Oral two times a day  carvedilol 3.125 milliGRAM(s) Oral every 12 hours  clopidogrel Tablet 75 milliGRAM(s) Oral daily  diazepam    Tablet 2 milliGRAM(s) Oral daily PRN  FLUoxetine 20 milliGRAM(s) Oral <User Schedule>  latanoprost 0.005% Ophthalmic Solution 1 Drop(s) Both EYES at bedtime  melatonin 3 milliGRAM(s) Oral at bedtime PRN  mirtazapine 15 milliGRAM(s) Oral at bedtime  ondansetron Injectable 4 milliGRAM(s) IV Push every 8 hours PRN  temazepam 15 milliGRAM(s) Oral at bedtime PRN      Objective:     Vital Signs:  T(C): 36.6 (05-28-24 @ 06:40), Max: 36.6 (05-28-24 @ 06:40)  HR: 81 (05-28-24 @ 09:09) (64 - 87)  BP: 107/64 (05-28-24 @ 09:09) (102/66 - 122/81)  RR: 18 (05-28-24 @ 09:09) (16 - 19)  SpO2: 95% (05-28-24 @ 09:09) (93% - 96%)  Wt(kg): --    Physical Exam:   General: fatigued  HEENT: sclera anicteric  Neck: supple  CVS: JVP ~ 7 cm H20, RRR, s1, s2, systolic murmur  Chest: unlabored respirations, CTAB  Abdomen: non-distended  Extremities: no lower extremity edema b/l  Neuro: awake, alert & oriented  Psych: normal affect      Labs:   28 May 2024 06:47    138    |  102    |  88     ----------------------------<  98     3.9     |  20     |  2.35     Ca    8.9        28 May 2024 06:47    TPro  7.3    /  Alb  3.2    /  TBili  2.9    /  DBili  x      /  AST  115    /  ALT  73     /  AlkPhos  372    28 May 2024 06:47                          11.6   6.38  )-----------( 165      ( 28 May 2024 06:47 )             34.3           TTE (4/7/24):  LVEF 25-30%, mild LVH  Mildly enlarged right ventricle.  Mild-moderate MR, Moderate TR, Mild AR, Mild AL    ECG (5/26/24): sinus rhythm, first degree AV block, PVC, incomplete RBBB, anteroseptal infarct (similar to prior ECG)

## 2024-05-28 NOTE — DISCHARGE NOTE NURSING/CASE MANAGEMENT/SOCIAL WORK - NSDCPEFALRISK_GEN_ALL_CORE
For information on Fall & Injury Prevention, visit: [email or web address] OR  [email or web address] OR  https://www.cdc.gov/steadi/patient.html

## 2024-05-28 NOTE — DISCHARGE NOTE NURSING/CASE MANAGEMENT/SOCIAL WORK - NSDCVIVACCINE_GEN_ALL_CORE_FT
COVID-19, mRNA, LNP-S, PF, 100 mcg/ 0.5 mL dose (Moderna); 17-May-2022 14:07; Pankaj Velazco (RN); [employer] [id number] (Exp. Date: 05-Jun-2022); IntraMuscular; Deltoid Left.; 0.25 milliLiter(s);

## 2024-05-28 NOTE — DISCHARGE NOTE NURSING/CASE MANAGEMENT/SOCIAL WORK - PATIENT PORTAL LINK FT
You can access the FollowMyHealth Patient Portal offered by [facility] by registering at the following website: [email or web address]. By joining [employer]’s FollowMyHealth portal, you will also be able to view your health information using other applications (apps) compatible with our system.

## 2024-05-28 NOTE — DISCHARGE NOTE PROVIDER - PROVIDER TOKENS
PROVIDER:[TOKEN:[[id number]]] PROVIDER:[TOKEN:[[id number]],FOLLOWUP:[1 week],ESTABLISHEDPATIENT:[T]],PROVIDER:[TOKEN:[[id number]],FOLLOWUP:[1 week],ESTABLISHEDPATIENT:[T]]

## 2024-05-28 NOTE — CONSULT NOTE ADULT - CONVERSATION DETAILS
Introduced palliative care team to pt at bedside and discussed supportive care team. Pt was sleepy but answered questions appropriately. When asked about MCKENNA as an option by Dr. Sheehan he stated he would think about this vs going home. He also was okay with needing O2 at home if needed.  We also discussed code status and he confirmed he would want chest compressions/intubation if needed; pt remains FULL CODE.

## 2024-05-28 NOTE — PROGRESS NOTE ADULT - ATTENDING COMMENTS
Please see resident note for full details regarding the service.     PE: A+Ox3, no murmurs, decreased breath sounds right lung base, abd NT/ND, no lower extremity swelling, no FND, + jaundice, negative Velasco's sign    Assessment:   - Nausea likely multifactorial from progressive CKD vs. GERD vs. medication induced   - BRODY on CKD Stage 3a likely pre-renal/ATN/volume depletion vs. 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 Gilliam (cardiology) this morning regarding the pt and she feels the patient is euvolemic -> c/w Bumex 1 mg PO BID, Coreg, cardiomems and ACID if EF remains low on GDMT, needs to follow up with his CHF specialist   - I spoke to GI over the phone -> will repeat RUQ US and if abnormal will re-consult   - I spoke to Dr. Espinal (pulmonology) today regarding the pt - V/Q scan in AM to r/o VTE, albuterol, hold diuretics  - I spoke to Dr. Romeo (nephrology) today regarding the pt -> holding Bumex today given BRODY on CKD with elevated BUN/Cr ratio suggesting pre-renal etiology   - Given anemia, CKD, elevated monocytes yesterday I ordered immunofixation, SPEP, UPEP -> light chains are elevated -> Heme/Onc consult - Dr. Schwartz   - Pt does have stones but no biliary dilation on prior imaging -> f/u repeat US -> if abnormal will reconsult GI and surgery -> pt is not a good candidate for cholecystectomy at this time and would need percutaneous cholecystostomy tube if indicated   - CXR in AM   - V/Q scan tomorrow   - Holding flomax  - ARNI/ACE/ARB/mineralocorticoid/farxiga contraindicated currently d/t BRODY on CKD per conversation with nephrology last admission   - Exercise on ambulation 95%    - Needs to f/u with radiation oncologist outpt for PET scan findings   - Palliative care consult   - PT - home PT   - Code status: Full Code   - Dispo: active, f/u above   - Prognosis: guarded, EF 25-30%, c/w above management     I spent a total of 50 minutes on the date of this encounter coordinating the patient's care, excluding resident teaching time. This includes reviewing results/imaging and discussions with specialists, nursing, case management/social work. Further tests, medications, and procedures have been ordered as indicated. Results and the plan of care were communicated to the patient and/or their family member. Supporting documentation was completed and added to the patient's chart.

## 2024-05-28 NOTE — DISCHARGE NOTE PROVIDER - NSDCFUSCHEDAPPT_GEN_ALL_CORE_FT
Mayelin Burden  [facility]  [facility]  Scheduled Appointment: 05/30/2024    Jay Shell  [facility]  [facility] [address]  Scheduled Appointment: 07/08/2024     Mayelin Burden  [facility]  [facility]  Scheduled Appointment: 05/30/2024    Delroy Boston  [facility]  [facility] [address]  Scheduled Appointment: 05/31/2024    Jay Shell  [facility]  HEARTFAIL [address]  Scheduled Appointment: 07/08/2024     Mayelin Burden  [facility] Physician [facility]  [facility]  Scheduled Appointment: 05/30/2024    Delroy Boston  [facility]  FAMILYMED [address]  Scheduled Appointment: 05/31/2024    Niles Walden  [facility]  CARDIOLOGY [address]  Scheduled Appointment: 06/05/2024    Que Alejandre  [facility]  RADMED [address]  Scheduled Appointment: 06/27/2024    Jay Shell  [facility]  HEARTFAIL [address]  Scheduled Appointment: 07/08/2024     Delroy Boston  [facility]  FAMILYMED [address]  Scheduled Appointment: 05/31/2024    Niles Walden  [facility]  CARDIOLOGY [address]  Scheduled Appointment: 06/05/2024    Que Alejandre  [facility]  RADMED [address]  Scheduled Appointment: 06/27/2024    Jay Shell  [facility]  HEARTFAIL [address]  Scheduled Appointment: 07/08/2024     Delroy Boston  [facility] Physician [facility]  FAMILYMED [address]  Scheduled Appointment: 05/31/2024    Niles Walden  [facility] Physician [facility]  CARDIOLOGY [address]  Scheduled Appointment: 06/05/2024    Jacob Vo  [facility] Physician [facility]  GENSURG [address]  Scheduled Appointment: 06/11/2024    Que Alejandre  [facility] Physician [facility]  RADMED [address]  Scheduled Appointment: 06/27/2024    Jay Shell  [facility]  HEARTFAIL [address]  Scheduled Appointment: 07/08/2024

## 2024-05-28 NOTE — DISCHARGE NOTE PROVIDER - NSDCMRMEDTOKEN_GEN_ALL_CORE_FT
aspirin 81 mg oral delayed release tablet: 1 tab(s) orally once a day  bumetanide 2 mg oral tablet: 1 tab(s) orally once a day  clopidogrel 75 mg oral tablet: 1 tab(s) orally once a day  Flomax 0.4 mg oral capsule: 1 cap(s) orally once a day Takes alternating 1 cap and 2 cap  FLUoxetine (Eqv-PROzac) 20 mg oral tablet: 1 tab(s) orally every 3 days  latanoprost 0.005% ophthalmic solution: 1 drop(s) in each eye once a day  metoprolol succinate 25 mg oral tablet, extended release: 1 tab(s) orally once a day  mirtazapine 15 mg oral tablet: 1 tab(s) orally once a day (at bedtime)  rosuvastatin 20 mg oral tablet: 1 tab(s) orally once a day (at bedtime)  temazepam 15 mg oral capsule: 1 cap(s) orally once a day (at bedtime) as needed for  insomnia   aspirin 81 mg oral delayed release tablet: 1 tab(s) orally once a day  carvedilol 3.125 mg oral tablet: 1 tab(s) orally every 12 hours  clopidogrel 75 mg oral tablet: 1 tab(s) orally once a day  FLUoxetine (Eqv-PROzac) 20 mg oral tablet: 1 tab(s) orally every 3 days  latanoprost 0.005% ophthalmic solution: 1 drop(s) in each eye once a day  mirtazapine 15 mg oral tablet: 1 tab(s) orally once a day (at bedtime)  rosuvastatin 20 mg oral tablet: 1 tab(s) orally once a day (at bedtime)  temazepam 15 mg oral capsule: 1 cap(s) orally once a day (at bedtime) as needed for  insomnia   aspirin 81 mg oral delayed release tablet: 1 tab(s) orally once a day  bumetanide 1 mg oral tablet: 1 tab(s) orally once a day  carvedilol 3.125 mg oral tablet: 1 tab(s) orally every 12 hours  clopidogrel 75 mg oral tablet: 1 tab(s) orally once a day  FLUoxetine (Eqv-PROzac) 20 mg oral tablet: 1 tab(s) orally every 3 days  latanoprost 0.005% ophthalmic solution: 1 drop(s) in each eye once a day  mirtazapine 15 mg oral tablet: 1 tab(s) orally once a day (at bedtime)  rosuvastatin 20 mg oral tablet: 1 tab(s) orally once a day (at bedtime)  temazepam 15 mg oral capsule: 1 cap(s) orally once a day (at bedtime) as needed for  insomnia

## 2024-05-28 NOTE — DISCHARGE NOTE PROVIDER - NSDCCAREPROVSEEN_GEN_ALL_CORE_FT
Porsche, Calvin Alcazar, Tio Sheehan, Kena Barajas, Hiren Bowser Ma. Dilan Ardon, Jarad Gilliam, Adam Easton, Sharita Ball, Layton Campoverde, Cathy Romeo, Becka Pride

## 2024-05-28 NOTE — PHYSICAL THERAPY INITIAL EVALUATION ADULT - PERTINENT HX OF CURRENT PROBLEM, REHAB EVAL
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  Pt accompanied by his wife. Pt notes that after coming home yesterday from [facility], pt experienced tiredness, nausea and dry heaving at night. Pt endorses taking Bumex, metoprolol, plavix and ASA all together late this morning and started feeling dizziness, and worsening dyspnea associated with some weakness, causing him to BIBA to [facility]. In the ED, pt was found to be hypoxic sat 90% on RA and hypotensive.  Labs were notable for h&h 12.6/38.2, BUN/Cr 68/2.05, Alkphos 317, AST 76, BNP 5050. CXR showed cardiomegaly and peripheral vascular congestion. Pt was given fluids, one dose IV bumex.. RVP negative.

## 2024-05-28 NOTE — DISCHARGE NOTE PROVIDER - HOSPITAL COURSE
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 In the ED, pt was found to be hypoxic sat 90% on RA and hypotensive.  Labs were notable for h&h 12.6/38.2, BUN/Cr 68/2.05, Alkphos 317, AST 76, BNP 5050. CXR showed cardiomegaly and peripheral vascular congestion. Pt was given fluids, one dose IV bumex, and started on oxygen. He was admitted for r/o heart failure. Cardiology saw the pt and started carvedilol 3.125 BID but holding Entresto, spironolactone and SGLT2 inhibitor for renal insufficiency, with recommendation for outpt heart failure f/u for cardiomems and AICD. Gastroenterologist saw pt and recommended RUQ US to r/o biliary etiology, but noted that GI sxs likely secondary to biliary dilation. Today pt is feeling better, no respiratory sxs, no current nausea or dry heaves. He will be discharged for outpt f/u with PCP and cardiology.    5/28/24: Pt feels good today with no new complaints, and is eager for discharge. Plan for discharge was endorsed; pt is amenable. All issues and questions addressed. Denies fevers, chills, palpitations, sob, n/v. No other urgent new sxs reported.  ROS as stated above.    Vital Signs Last 24 Hrs  T(C): 36.6 (28 May 2024 06:40), Max: 36.6 (28 May 2024 06:40)  T(F): 97.8 (28 May 2024 06:40), Max: 97.8 (28 May 2024 06:40)  HR: 81 (28 May 2024 09:09) (64 - 87)  BP: 107/64 (28 May 2024 09:09) (102/66 - 122/81)  BP(mean): --  RR: 18 (28 May 2024 09:09) (16 - 19)  SpO2: 95% (28 May 2024 09:09) (93% - 96%)    Parameters below as of 28 May 2024 09:09  Patient On (Oxygen Delivery Method): room air    PHYSICAL EXAM  Constitutional: Pt lying in bed, awake and alert, NAD  HEENT: EOMI, normocephalic, moist mucous membranes  Neck: Soft and supple  Respiratory: Decreased breath sound right lower lobe, No wheezing, rales or rhonchi  Cardiovascular: S1S2+, no M/G/R  Gastrointestinal: BS+, soft, NT, ND no guarding, no rebound   Extremities: No calf pain, no edema, bilateral varicosities  Vascular: Peripheral pulses present, b/l LE varicosities  Neurological: AAOx3, no focal deficits  Musculoskeletal: Normal muscle tone, no atrophy, no rigidity, no contractions    RADIOLOGY/EKG (personally reviewed):  < from: 12 Lead ECG (05.26.24 @ 14:10) >  Diagnosis Line Sinus rhythm [id number] degree AV block with occasional premature ventricular complexes  Left axis deviation  Incomplete right bundle branch block  Anteroseptal infarct (cited on or before 11-MAY-2022)  Abnormal ECG  When compared with ECG of 22-MAY-2024 14:25,  No significant change was found  Confirmed by ALBIN COLON, JESSICA S ([id number]) on 5/27/2024 8:14:20 AM    < end of copied text >  < from: Xray Chest 1 View- PORTABLE-Urgent (05.26.24 @ 14:52) >    IMPRESSION: Stable findings as above. 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 In the ED, pt was found to be hypoxic sat 90% on RA and hypotensive.  Labs were notable for h&h 12.6/38.2, BUN/Cr 68/2.05, Alkphos 317, AST 76, BNP 5050. CXR showed cardiomegaly and peripheral vascular congestion. Pt was given fluids, one dose IV bumex, and started on oxygen. He was admitted for dyspnea. Cardiology saw the pt and started carvedilol 3.125 BID but holding Entresto, spironolactone and SGLT2 inhibitor for renal insufficiency, with recommendation for outpt heart failure f/u for cardiomems and AICD. Nephrology saw the patient and Bumex was held due to renal function.  Gastroenterologist saw pt and noted that GI sxs likely secondary to biliary dilation. Patient underwent VQ scan: very low probability for PE.   Seen by Hematology-oncology, who recommended further outpatient follow up of his lung cancer, because of concern for metastases. Seen by pulmonology who recommended PRN albuterol.  Today pt is feeling better, no respiratory sxs, no current nausea or dry heaves. He will be discharged for outpt f/u with PCP and cardiology.    5/29/24: Pt feels good today with no new complaints, and is eager for discharge. He ambulated without issue. Plan for discharge was endorsed; pt is amenable. All issues and questions addressed. Denies fevers, chills, palpitations, sob, n/v. No other urgent new sxs reported.  ROS as stated above.    Vital Signs Last 24 Hrs  T(C): 36.3 (29 May 2024 13:18), Max: 36.4 (28 May 2024 20:32)  T(F): 97.4 (29 May 2024 13:18), Max: 97.6 (28 May 2024 20:32)  HR: 64 (29 May 2024 13:18) (64 - 74)  BP: 102/63 (29 May 2024 13:18) (101/65 - 109/64)  BP(mean): --  RR: 16 (29 May 2024 13:18) (16 - 18)  SpO2: 95% (29 May 2024 13:18) (93% - 100%)    Parameters below as of 29 May 2024 13:18  Patient On (Oxygen Delivery Method): room air    PHYSICAL EXAM  Constitutional: Pt lying in bed, awake and alert, NAD  HEENT: EOMI, normocephalic, moist mucous membranes  Neck: Soft and supple  Respiratory: Decreased breath sound right lower lobe, No wheezing, rales or rhonchi  Cardiovascular: S1S2+, no M/G/R  Gastrointestinal: BS+, soft, NT, ND no guarding, no rebound   Extremities: No calf pain, no edema, bilateral varicosities  Vascular: Peripheral pulses present, b/l LE varicosities  Neurological: AAOx3, no focal deficits  Musculoskeletal: Normal muscle tone, no atrophy, no rigidity, no contractions    RADIOLOGY/EKG (personally reviewed):  < from: 12 Lead ECG (05.26.24 @ 14:10) >  Diagnosis Line Sinus rhythm [id number] degree AV block with occasional premature ventricular complexes  Left axis deviation  Incomplete right bundle branch block  Anteroseptal infarct (cited on or before 11-MAY-2022)  Abnormal ECG  When compared with ECG of 22-MAY-2024 14:25,  No significant change was found  Confirmed by ALBIN COLON, JESSICA TABOR ([id number]) on 5/27/2024 8:14:20 AM    < end of copied text >  < from: Xray Chest 1 View- PORTABLE-Urgent (05.26.24 @ 14:52) >    IMPRESSION: Stable findings as above. 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 In the ED, pt was found to be hypoxic sat 90% on RA and hypotensive.  Labs were notable for h&h 12.6/38.2, BUN/Cr 68/2.05, Alkphos 317, AST 76, BNP 5050. CXR showed cardiomegaly and peripheral vascular congestion. Pt was given fluids, one dose IV bumex, and started on oxygen. He was admitted for dyspnea. Cardiology saw the pt and started carvedilol 3.125 BID but holding Entresto, spironolactone and SGLT2 inhibitor for renal insufficiency, with recommendation for outpt heart failure f/u for cardiomems and AICD. Nephrology saw the patient and Bumex was held due to renal function.  It was later restarted at a lower dose. Gastroenterologist saw pt and noted that GI sxs likely secondary to biliary dilation. Patient underwent VQ scan: very low probability for PE.   Seen by Hematology-oncology, who recommended further outpatient follow up of his lung cancer, because of concern for metastases. Seen by pulmonology who recommended PRN albuterol. Flomax was stopped due to hypotension  Today pt is feeling better, no respiratory sxs, no current nausea or dry heaves. He will be discharged for outpt f/u with PCP and cardiology.    5/30/24: Pt feels good today with no new complaints, and is eager for discharge. He ambulated without issue. Plan for discharge was endorsed; pt is amenable. All issues and questions addressed. Denies fevers, chills, palpitations, sob, n/v. No other urgent new sxs reported.  ROS as stated above.    Vital Signs Last 24 Hrs  T(C): 36.3 (30 May 2024 05:32), Max: 36.3 (29 May 2024 13:18)  T(F): 97.3 (30 May 2024 05:32), Max: 97.4 (29 May 2024 13:18)  HR: 65 (30 May 2024 05:32) (64 - 78)  BP: 105/70 (30 May 2024 05:32) (102/63 - 112/65)  BP(mean): 82 (30 May 2024 05:32) (82 - 82)  RR: 16 (30 May 2024 05:32) (16 - 16)  SpO2: 99% (30 May 2024 05:32) (95% - 99%)    Parameters below as of 30 May 2024 05:32  Patient On (Oxygen Delivery Method): nasal cannula  O2 Flow (L/min): 2    PHYSICAL EXAM  Constitutional: Pt lying in bed, awake and alert, NAD  HEENT: EOMI, normocephalic, moist mucous membranes  Neck: Soft and supple  Respiratory: Decreased breath sound right lower lobe, No wheezing, rales or rhonchi  Cardiovascular: S1S2+, no M/G/R  Gastrointestinal: BS+, soft, NT, ND no guarding, no rebound   Extremities: No calf pain, no edema, bilateral varicosities  Vascular: Peripheral pulses present, b/l LE varicosities  Neurological: AAOx3, no focal deficits  Musculoskeletal: Normal muscle tone, no atrophy, no rigidity, no contractions    RADIOLOGY/EKG (personally reviewed):  < from: 12 Lead ECG (05.26.24 @ 14:10) >  Diagnosis Line Sinus rhythm [id number] degree AV block with occasional premature ventricular complexes  Left axis deviation  Incomplete right bundle branch block  Anteroseptal infarct (cited on or before 11-MAY-2022)  Abnormal ECG  When compared with ECG of 22-MAY-2024 14:25,  No significant change was found  Confirmed by ALBIN COLON, JESSICA TABOR ([id number]) on 5/27/2024 8:14:20 AM    < end of copied text >  < from: Xray Chest 1 View- PORTABLE-Urgent (05.26.24 @ 14:52) >    IMPRESSION: Stable findings as above.

## 2024-05-28 NOTE — DISCHARGE NOTE PROVIDER - NSDCCPCAREPLAN_GEN_ALL_CORE_FT
PRINCIPAL DISCHARGE DIAGNOSIS  Diagnosis: Hypotension  Assessment and Plan of Treatment: You came here for one day of dizziness/weakness, along with nausea and dry heaving. Your kidney and heart test showed elevated enzymes, but your dizziness/weakness and nausea/dry heaving was likely from low blood pressure and liver congestion. You were given fluids, one dose IV bumex, and started on oxygen. Cardiology saw you and started carvedilol 3.125 BID but held Entresto, spironolactone and SGLT2 inhibitor for renal insufficiency, with recommendation that you see an outpatient heart failure doctor for cardiomems and AICD. Gastroenterologist saw you and recommended ultrasound to rule out biliary etiology, but noted that your symptoms were probably hepatic congestion. Today you are stable, feeling better, no respiratory symptoms, no current nausea or dry heaves, and are ready for dischage. Please take your medications as indicated and follow-up with your PCP and your heart failure specialists ASAP. Thank you.     PRINCIPAL DISCHARGE DIAGNOSIS  Diagnosis: Hypotension  Assessment and Plan of Treatment: You came here for one day of dizziness/weakness, along with nausea and dry heaving. Your kidney and heart test showed elevated enzymes, but your dizziness/weakness and nausea/dry heaving was likely from low blood pressure and liver congestion. You were given fluids, one dose IV bumex, and started on oxygen. Cardiology saw you and started carvedilol 3.125 BID but held Entresto, spironolactone and SGLT2 inhibitor for renal insufficiency, with recommendation that you see an outpatient heart failure doctor for cardiomems and AICD. You bumex was also stopped. Gastroenterologist saw you and recommended ultrasound to rule out biliary etiology, but noted that your symptoms were probably hepatic congestion. Today you are stable, feeling better, no respiratory symptoms, no current nausea or dry heaves, and are ready for dischage. Please take your medications as indicated and follow-up with your PCP and your heart failure specialists ASAP. Thank you.      SECONDARY DISCHARGE DIAGNOSES  Diagnosis: Mass of left lung  Assessment and Plan of Treatment: You have a mass of the left upper lobe suspicious for maliganancy given your history of lung cancer. Please follow up with your oncologist Dr. Que Alejandre     PRINCIPAL DISCHARGE DIAGNOSIS  Diagnosis: Hypotension  Assessment and Plan of Treatment: You came here for one day of dizziness/weakness, along with nausea and dry heaving. Your kidney and heart test showed elevated enzymes, but your dizziness/weakness and nausea/dry heaving was likely from low blood pressure and liver congestion. You were given fluids, one dose IV bumex, and started on oxygen. Cardiology saw you and started carvedilol 3.125 BID but held Entresto, spironolactone and SGLT2 inhibitor for renal insufficiency, with recommendation that you see an outpatient heart failure doctor for cardiomems and AICD. Your bumex was also decreased to 1mg daily. Your flomax was stopped.  Gastroenterologist saw you and recommended ultrasound to rule out biliary etiology, but noted that your symptoms were probably hepatic congestion. Today you are stable, feeling better, no respiratory symptoms, no current nausea or dry heaves, and are ready for dischage. Please take your medications as indicated and follow-up with your PCP and your heart failure specialists ASAP. Thank you.      SECONDARY DISCHARGE DIAGNOSES  Diagnosis: Mass of left lung  Assessment and Plan of Treatment: You have a mass of the left upper lobe suspicious for maliganancy given your history of lung cancer. Please follow up with your oncologist Dr. Que Alejandre

## 2024-05-28 NOTE — PROGRESS NOTE ADULT - ASSESSMENT
Assessment:  John Becerra is a 79 year old man with past medical history of Bipolar disorder, Coronary artery disease (history of STEMI in 2022, with multivessel CAD with partial viability on nuclear), HFrEF (LVEF 35% in May 2022), and Lung cancer (s/p radiation and lobectomy) with recent discharge last week for acute on chronic systolic heart failure now presents with nausea, vomiting and fatigue.     ECG consistent with sinus rhythm, old anteroseptal infarct and PVCs, similar to prior ECG, no acute ischemic ST abnormalities. Troponins not elevated in the range to suggest acute coronary syndrome. Most recent echo report from April consistent with LVEF 25-30%, moderate tricuspid regurgitation. Prior coronary angiogram from 2022 with normal left main, 70% stenosis of LAD, 70% stenosis of first diagonal, normal LCx and 100% stenosis of RCA that was medically managed after viability testing indicated mostly infarcted tissue. Pro BNP elevated, but appears chronically elevated. CXR with persistent loculated right pleural effusion.     Recommendations:  [] Nausea, vomiting, fatigue: Likely multifactorial, patient with cholelithiasis on recent CT abdomen, consider GI re-evaluation. Treatment of chronic cardiomyopathy as per below  [] Chronic systolic heart failure: Appears euvolemic on exam. Continue Bumex 1 mg PO BID. In regards to guideline directed medical therapy due to CKD with a GFR of 27, the patient is likely not an ideal candidate for Farxiga and Spironolactone at this time, consider Nephrology input. The patient would be a good candidate for CardioMEMS given recurrent CHF hospitalizations and difficulty with volume management as outpatient, message was sent to his CHF cardiologist, Dr. Jay Shell. May also require ICD if EF remains low on GDMT.    Discussed with Dr. Sheehan and Dr. Ardon. The patient is CV stable for discharge home today with close outpatient follow up with his cardiologist and CHF cardiologist within 1 week. Discussed with Dr. Sheehan.    Adam Gilliam MD  Cardiology                 Assessment:  John Becerra is a 79 year old man with past medical history of Bipolar disorder, Coronary artery disease (history of STEMI in 2022, with multivessel CAD with partial viability on nuclear), HFrEF (LVEF 35% in May 2022), and Lung cancer (s/p radiation and lobectomy) with recent discharge last week for acute on chronic systolic heart failure now presents with nausea, vomiting and fatigue.     ECG consistent with sinus rhythm, old anteroseptal infarct and PVCs, similar to prior ECG, no acute ischemic ST abnormalities. Troponins not elevated in the range to suggest acute coronary syndrome. Most recent echo report from April consistent with LVEF 25-30%, moderate tricuspid regurgitation. Prior coronary angiogram from 2022 with normal left main, 70% stenosis of LAD, 70% stenosis of first diagonal, normal LCx and 100% stenosis of RCA that was medically managed after viability testing indicated mostly infarcted tissue. Pro BNP elevated, but appears chronically elevated. CXR with persistent loculated right pleural effusion.     Recommendations:  [] Nausea, vomiting, fatigue: Likely multifactorial, patient with cholelithiasis on recent CT abdomen, consider GI re-evaluation. Treatment of chronic cardiomyopathy as per below  [] Chronic systolic heart failure: Appears euvolemic on exam. Continue Bumex 1 mg PO BID. In regards to guideline directed medical therapy due to CKD with a GFR of 27, the patient is likely not an ideal candidate for Farxiga and Spironolactone at this time, consider Nephrology input. The patient would be a good candidate for CardioMEMS given recurrent CHF hospitalizations and difficulty with volume management as outpatient, message was sent to his CHF cardiologist, Dr. Jay Shell. May also require ICD if EF remains low on GDMT.    Discussed with Dr. Sheehan and Dr. Ardon. The patient is CV stable for discharge home today with close outpatient follow up with his cardiologist and CHF cardiologist within 1 week.     Adam Gilliam MD  Cardiology     Assessment:  John Becerra is a 79 year old man with past medical history of Bipolar disorder, Coronary artery disease (history of STEMI in 2022, with multivessel CAD with partial viability on nuclear), HFrEF (LVEF 35% in May 2022), and Lung cancer (s/p radiation and lobectomy) with recent discharge last week for acute on chronic systolic heart failure now presents with nausea, vomiting and fatigue.     ECG consistent with sinus rhythm, old anteroseptal infarct and PVCs, similar to prior ECG, no acute ischemic ST abnormalities. Troponins not elevated in the range to suggest acute coronary syndrome. Most recent echo report from April consistent with LVEF 25-30%, moderate tricuspid regurgitation. Prior coronary angiogram from 2022 with normal left main, 70% stenosis of LAD, 70% stenosis of first diagonal, normal LCx and 100% stenosis of RCA that was medically managed after viability testing indicated mostly infarcted tissue. Pro BNP elevated, but appears chronically elevated. CXR with persistent loculated right pleural effusion.     Recommendations:  [] Nausea, vomiting, fatigue: Likely multifactorial, patient with cholelithiasis on recent CT abdomen, consider GI re-evaluation. Treatment of chronic cardiomyopathy as per below  [] Chronic systolic heart failure: Appears euvolemic on exam. Continue Bumex 1 mg PO BID. In regards to guideline directed medical therapy due to CKD with a GFR of 27, the patient is likely not an ideal candidate for Farxiga and Spironolactone at this time, consider Nephrology input, continue Coreg 3.125 mg BID. The patient would be a good candidate for CardioMEMS given recurrent CHF hospitalizations and difficulty with volume management as outpatient, message was sent to his CHF cardiologist, Dr. Jay Shell. May also require ICD if EF remains low on GDMT.    Discussed with Dr. Sheehan and Dr. Ardon. The patient is CV stable for discharge home today with close outpatient follow up with his cardiologist and CHF cardiologist within 1 week.     Adam Gilliam MD  Cardiology     Assessment:  John Becerra is a 79 year old man with past medical history of Bipolar disorder, Coronary artery disease (history of STEMI in 2022, with multivessel CAD with partial viability on nuclear), HFrEF (LVEF 35% in May 2022), and Lung cancer (s/p radiation and lobectomy) with recent discharge last week for acute on chronic systolic heart failure now presents with nausea, vomiting and fatigue.     ECG consistent with sinus rhythm, old anteroseptal infarct and PVCs, similar to prior ECG, no acute ischemic ST abnormalities. Troponins not elevated in the range to suggest acute coronary syndrome. Most recent echo report from April consistent with LVEF 25-30%, moderate tricuspid regurgitation. Prior coronary angiogram from 2022 with normal left main, 70% stenosis of LAD, 70% stenosis of first diagonal, normal LCx and 100% stenosis of RCA that was medically managed after viability testing indicated mostly infarcted tissue. Pro BNP elevated, but appears chronically elevated. CXR with persistent loculated right pleural effusion.     Recommendations:  [] Nausea, vomiting, fatigue: Likely multifactorial, patient with cholelithiasis on recent CT abdomen, consider GI re-evaluation. Treatment of chronic cardiomyopathy as per below  [] Chronic systolic heart failure: Appears euvolemic on exam. Continue Bumex 1 mg PO BID. In regards to guideline directed medical therapy due to CKD with a GFR of 27, the patient is likely not an ideal candidate for Entresto, Farxiga and Spironolactone at this time, consider Nephrology input, continue Coreg 3.125 mg BID. The patient would be a good candidate for CardioMEMS given recurrent CHF hospitalizations and difficulty with volume management as outpatient, message was sent to his CHF cardiologist, Dr. Jay Shell. May also require ICD if EF remains low on GDMT.    Discussed with Dr. Sheehan and Dr. Ardon. The patient is CV stable for discharge home today with close outpatient follow up with his cardiologist and CHF cardiologist within 1 week.     Adam Gilliam MD  Cardiology

## 2024-05-28 NOTE — CONSULT NOTE ADULT - SUBJECTIVE AND OBJECTIVE BOX
PULMONARY CONSULT  Location of Patient :  [facility] E[facility] W1 ( [facility])      Initial HPI on admission:  HPI:  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% (Echo 4/2024), valvular disease (mild-moderate MR, moderate TR), CKD, anxiety/depression/bipolar disorder, history of lung cancer s/p RUL lobectomy (9/2022) with recurrent disease s/p radiation to left lung (completed 3/2023), recent admission for 5/22-5/25 for acute decompensated heart failure, presenting for weakness/dizziness.  Pt accompanied by his wife. Pt notes that after coming home yesterday from [facility], pt experienced tiredness, nausea and dry heaving at night. Pt endorses taking Bumex, metoprolol, plavix and ASA all together late this morning and started feeling dizziness, and worsening dyspnea associated with some weakness, causing him to BIBA to [facility]. In the ED, pt was found to be hypoxic sat 90% on RA and hypotensive.  Labs were notable for h&h 12.6/38.2, BUN/Cr 68/2.05, Alkphos 317, AST 76, BNP 5050. CXR showed cardiomegaly and peripheral vascular congestion. Pt was given fluids, one dose IV bumex.. RVP negative. (26 May 2024 16:10)      BRIEF HOSPITAL COURSE:   patient known to me from office   states at home had OGLESBY with mild exertion improves with rest  denies wheezing  did not use any inhalers wiht sob      PAST MEDICAL & SURGICAL HISTORY:  BPH (benign prostatic hyperplasia)  Bipolar disorder  Depression  Anxiety  Umbilical hernia, incarcerated  Depression  Constipation  Urinary retention  CAD (coronary artery disease)  SCC (squamous cell carcinoma)  Lung mass  Other nonspecific abnormal finding of lung field  LV (left ventricular) mural thrombus  History of inguinal hernia  History of CHF (congestive heart failure)  History of arthroscopy of knee Right, 1987  History of tonsillectomy 1952  History of hernia repair   H/O coronary angiogram        Allergies    No Known Allergies    Intolerances      FAMILY HISTORY:  Family history of depression (Mother)    FH: CAD (coronary artery disease) (Sibling, Sibling)    Family history of diabetes mellitus (DM) (Sibling)      Social history: Social History:  No illicit drugs, or smoking. (26 May 2024 16:10)       Smoking: Never smoker     Review of Systems: as stated above    CONSTITUTIONAL: No fever, No chills,  + fatigue  EYES: No eye pain, No visual disturbances, No discharge  ENMT:  No difficulty hearing, No tinnitus, No vertigo; No sinus or throat pain  NECK: No pain, No stiffness  RESPIRATORY: No Cough, + SOB, No Secretions  CARDIOVASCULAR: No chest pain, No palpitations, No dizziness, or + leg swelling  GASTROINTESTINAL: No abdominal or epigastric pain. No nausea, No vomiting, No hematemesis; No diarrhea, No constipation. No melena, No hematochezia.  GENITOURINARY: No dysuria, No frequency, No hematuria, No incontinence  NEUROLOGICAL: No headaches, No memory loss, No loss of strength, No numbness, No tremors  SKIN: No itching, No burning, No rashes, No lesions   MUSCULOSKELETAL: No joint pain or swelling; No muscle, back, No extremity pain  PSYCHIATRIC: No depression, No anxiety, No mood swings, No difficulty sleeping      Medications:  MEDICATIONS  (STANDING):  aspirin enteric coated 81 milliGRAM(s) Oral daily  atorvastatin 80 milliGRAM(s) Oral at bedtime  buMETAnide 1 milliGRAM(s) Oral two times a day  carvedilol 3.125 milliGRAM(s) Oral every 12 hours  clopidogrel Tablet 75 milliGRAM(s) Oral daily  FLUoxetine 20 milliGRAM(s) Oral <User Schedule>  latanoprost 0.005% Ophthalmic Solution 1 Drop(s) Both EYES at bedtime  mirtazapine 15 milliGRAM(s) Oral at bedtime    MEDICATIONS  (PRN):  acetaminophen     Tablet .. 650 milliGRAM(s) Oral every 6 hours PRN Temp greater or equal to 38C (100.4F), Mild Pain (1 - 3)  aluminum hydroxide/magnesium hydroxide/simethicone Suspension 30 milliLiter(s) Oral every 4 hours PRN Dyspepsia  melatonin 3 milliGRAM(s) Oral at bedtime PRN Insomnia  ondansetron Injectable 4 milliGRAM(s) IV Push every 8 hours PRN Nausea and/or Vomiting  temazepam 15 milliGRAM(s) Oral at bedtime PRN Insomnia      Antibiotics History      Heme Medications   aspirin enteric coated 81 milliGRAM(s) Oral daily, 05-26-24 @ 16:32  clopidogrel Tablet 75 milliGRAM(s) Oral daily, 05-26-24 @ 16:34      GI Medications  aluminum hydroxide/magnesium hydroxide/simethicone Suspension 30 milliLiter(s) Oral every 4 hours, 05-26-24 @ 16:45, Routine PRN        Home Medications:  Last Order Reconciliation Date: 05-26-24 @ 16:35 (Admission Reconciliation)  aspirin 81 mg oral delayed release tablet: 1 tab(s) orally once a day (05-26-24 @ 16:22)  bumetanide 2 mg oral tablet: 1 tab(s) orally once a day (05-26-24 @ 16:22)  clopidogrel 75 mg oral tablet: 1 tab(s) orally once a day (05-26-24 @ 16:22)  Flomax 0.4 mg oral capsule: 1 cap(s) orally once a day Takes alternating 1 cap and 2 cap (05-26-24 @ 16:22)  FLUoxetine (Eqv-PROzac) 20 mg oral tablet: 1 tab(s) orally every 3 days (05-26-24 @ 16:22)  latanoprost 0.005% ophthalmic solution: 1 drop(s) in each eye once a day (05-26-24 @ 16:22)  metoprolol succinate 25 mg oral tablet, extended release: 1 tab(s) orally once a day (05-26-24 @ 16:22)  mirtazapine 15 mg oral tablet: 1 tab(s) orally once a day (at bedtime) (05-26-24 @ 16:22)  rosuvastatin 20 mg oral tablet: 1 tab(s) orally once a day (at bedtime) (05-26-24 @ 16:22)  temazepam 15 mg oral capsule: 1 cap(s) orally once a day (at bedtime) as needed for  insomnia (05-26-24 @ 16:22)      LABS:                        11.6   6.38  )-----------( 165      ( 28 May 2024 06:47 )             34.3     05-28    138  |  102  |  88<H>  ----------------------------<  98  3.9   |  20<L>  |  2.35<H>    Ca    8.9      28 May 2024 06:47    TPro  7.3  /  Alb  3.2<L>  /  TBili  2.9<H>  /  DBili  x   /  AST  115<H>  /  ALT  73<H>  /  AlkPhos  372<H>  05-28      PFT 8/3/22 FVC 76%, FEV1- 70%, FEV1/FVC - 0.67  DLCO 62% VA 86% DLCO/VA 71%  Emphysema vs CHF decreasing DLCO     Trend AST/ALT/ALK Phos/Bili  05-28-24 @ 06:47   115<H>/73<H>/372<H>/2.9<H>  05-27-24 @ 06:52   90<H>/63<H>/316<H>/3.0<H>  05-26-24 @ 14:25   76<H>/38/317<H>/3.4<H>  05-25-24 @ 06:07   42<H>/38/311<H>/2.3<H>  05-24-24 @ 06:22   44<H>/35/294<H>/2.0<H>  05-23-24 @ 06:37   46<H>/37/316<H>/1.9<H>  05-22-24 @ 14:31   44<H>/33/283<H>/1.9<H>  04-19-24 @ 14:45   31/28/284<H>/1.2  04-08-24 @ 07:10   41<H>/29/193<H>/0.8  04-06-24 @ 12:55   35/29/204<H>/0.8  12-23-23 @ 06:50   68<H>/76<H>/278<H>/1.7<H>  12-22-23 @ 16:35   85<H>/84<H>/295<H>/2.1<H>     Trend Bun/Cr  05-28-24 @ 06:47  BUN/CR -  88<H> / 2.35<H>  05-27-24 @ 06:52  BUN/CR -  81<H> / 2.28<H>  05-26-24 @ 14:25  BUN/CR -  68<H> / 2.05<H>  05-25-24 @ 06:07  BUN/CR -  63<H> / 1.79<H>  05-24-24 @ 06:22  BUN/CR -  64<H> / 2.10<H>  05-23-24 @ 06:37  BUN/CR -  60<H> / 2.11<H>  05-22-24 @ 14:31  BUN/CR -  56<H> / 1.95<H>  04-19-24 @ 14:45  BUN/CR -  44<H> / 1.54<H>  04-10-24 @ 05:54  BUN/CR -  45<H> / 1.44<H>  04-09-24 @ 07:23  BUN/CR -  46<H> / 1.33<H>  04-08-24 @ 07:10  BUN/CR -  57<H> / 1.36<H>  04-07-24 @ 06:00  BUN/CR -  87<H> / 1.62<H>      Trend Cardiac Enzymes  05-26-24 @ 16:10  UWQ-BXIEA-OBEKJ-CPKMM/Trop I - -- - --  - --  -  --  /  66.3  05-26-24 @ 14:25  WVI-VRTTH-ICWMP-CPKMM/Trop I - -- - --  - --  -  --  /  63.8    Trend BNP  05-26-24 @ 14:25   -  5050<H>  05-22-24 @ 14:31   -  4697<H>  04-19-24 @ 18:15   -  6357<H>  04-10-24 @ 05:54   -  4956<H>  04-09-24 @ 07:23   -  4219<H>  04-08-24 @ 07:10   -  2826<H>        RADIOLOGY  CXR:  < from: Xray Chest 1 View- PORTABLE-Urgent (05.26.24 @ 14:52) >  ACC: [id number] EXAM:  XR CHEST PORTABLE URGENT 1V   ORDERED BY: MAKENZIE CABELLO     PROCEDURE DATE:  05/26/2024          INTERPRETATION:  AP semierect chest on May 26, 2024 2:25 PM. Patient is   short of breath.    Heart enlargement again noted.    There is persistent loculated effusion with mild scarring at the right   base.    Chest is similar to May 22.    IMPRESSION: Stable findings as above.    --- End of Report ---      < end of copied text >      CT:    < from: CT Abdomen and Pelvis No Cont (05.24.24 @ 08:58) >    IMPRESSION:  Cholelithiasis with  nonspecific gallbladder wall thickening. Correlate   with symptomatology right upper quadrant ultrasound    Small ascites.    Additional findings as discussed    --- End of Report ---    < end of copied text >    < from: CT Chest No Cont (04.01.24 @ 11:17) >    FINDINGS:    LUNGS/AIRWAYS/PLEURA: Overall similar size of left lobe 2.3 cm solid   nodule within the radiation field, although now has a more rounded   contour (3-46), gradually becoming more discrete over multiple prior   studies. Right upper lobectomy. Unchanged mild indistinct groundglass in   the superior aspect of the right middle lobe (3-43). No pleural effusion.    LYMPH NODES/MEDIASTINUM: No lymphadenopathy.    HEART/VASCULATURE: Enlarged heart. No pericardial effusion. Coronary   artery calcifications. Normal caliber aorta.    UPPER ABDOMEN: Cholelithiasis. Unchanged subcentimeter hypodensity in the   liver, too small to characterize. Unchanged left adrenal nodule, likely   an adenoma. Partially included right renal cyst.    BONES/SOFT TISSUES: Unremarkable.        IMPRESSION:    Left upper lobe nodule within the radiation field has a more rounded   contour, and gradually become more discrete over multiple prior studies.   Neoplasm should be considered.    --- End of Report ---    < end of copied text >      ECHO:  < from: TTE Echo Complete w/o Contrast w/ Doppler (04.07.24 @ 11:12) >      Summary:   1. Left ventricular ejection fraction, by visual estimation, is 25 to 30%.   2. Severely decreased global left ventricular systolic function.   3. Left atrial enlargement.   4. Increased LV wall thickness.   5. Spectral Dopplershows impaired relaxation pattern of left ventricular myocardial filling (Grade I diastolic dysfunction).   6. There is mild concentric left ventricular hypertrophy.   7. Mildly enlarged right ventricle.   8. Right atrial enlargement.   9. Mild to moderate mitral valve regurgitation.  10. Moderate tricuspid regurgitation.  11. Mild aortic regurgitation.  12. Mild pulmonic valve regurgitation.    [id number] Jarad Ardon MD, FACC , MD, FACC Electronically   signed on 4/7/2024 at 1:16:21 PM      < end of copied text >    < from: NM PET/CT Onc FDG Skull to Thigh, Subsq (05.05.24 @ 12:00) >    IMPRESSION:  1.  Left UPPER lobe pulmonary nodule appears increasingly rounded solid, with quantitatively stable uptake. Malignancy is suspected and tissue sampling may be helpful.  2.  Ill-defined hazy/groundglass findings in the lateral RIGHT LOWER lobe now demonstrates focal uptake appears more prominent than on recent CT.. Malignant involvement is not excluded.  3.  New RIGHT pleural effusion.    --- End of Report ---      < end of copied text >    VITALS:  T(C): 36.6 (05-28-24 @ 06:40), Max: 36.6 (05-28-24 @ 06:40)  T(F): 97.8 (05-28-24 @ 06:40), Max: 97.8 (05-28-24 @ 06:40)  HR: 81 (05-28-24 @ 09:09) (64 - 87)  BP: 107/64 (05-28-24 @ 09:09) (102/66 - 122/81)  BP(mean): --  ABP: --  ABP(mean): --  RR: 18 (05-28-24 @ 09:09) (16 - 19)  SpO2: 95% (05-28-24 @ 09:09) (93% - 96%)  CVP(mm Hg): --  CVP(cm H2O): --    Ins and Outs       Height (cm): 182.9 (05-28-24 @ 06:36)  Weight (kg): 84.4 (05-28-24 @ 06:36)  BMI (kg/m2): 25.2 (05-28-24 @ 06:36)        I&O's Detail      Physical Examination:  GENERAL:               Alert, Oriented, No acute distress.    HEENT:                      No JVD, Moist MM  PULM:                     Bilateral air entry, Clear to auscultation bilaterally, no significant sputum production, No Rales, No Rhonchi, No Wheezing  CVS:                         S1, S2,  +Murmur  ABD:                        Soft, nondistended, nontender, normoactive bowel sounds,   EXT:                         No edema, nontender, No Cyanosis or Clubbing    NEURO:                  Alert, oriented, interactive, nonfocal, follows commands  PSYC:                      Calm, + Insight.

## 2024-05-28 NOTE — CHART NOTE - NSCHARTNOTEFT_GEN_A_CORE
Nurse called because patient progressively more SOB through the day.   I evaluated patient at bedside. He appear very low energy and lips are cyanotic.   No cyanosis of fingers or toes.   Heart s1 and s2 with occasional premature beats. no murmurs.   Lungs sound similar to this AM with decreased breath sounds in right lower lobe. No crackles.   Abdomen Soft, NT/ND  trace bilateral LE edema    He reports mild SOB, worse with exertion. Some nausea.  Denies chest pain    -chest Xray done this afternoon with small right pleural effusion, similar to previous     -STAT ABG ordered  -Patient to go for VQ scan in the AM Nurse called because patient progressively more SOB through the day.   I evaluated patient at bedside. He appear very low energy and lips are cyanotic.   No cyanosis of fingers or toes.   Heart s1 and s2 with occasional premature beats. no murmurs.   Lungs sound similar to this AM with decreased breath sounds in right lower lobe. No crackles.   Abdomen Soft, NT/ND  trace bilateral LE edema    He reports mild SOB, worse with exertion. Some nausea.  Denies chest pain    VSS, satting 96% RA    -chest Xray done this afternoon with small right pleural effusion, similar to previous     -STAT ABG ordered  -Patient to go for VQ scan in the AM  -for now supplement nasal canula oxygen for comfort

## 2024-05-28 NOTE — DISCHARGE NOTE PROVIDER - CARE PROVIDER_API CALL
Jay Shell  [facility]  [address] [address] [place] [address], [address]  [place] [address]  Phone: [phone]  Fax: [phone]  Follow Up Time:    Jay Shell  [facility]  [address] [place] [address], [address]  [place] [address]  Phone: [phone]  Fax: [phone]  Established Patient  Follow Up Time: 1 week    Que Alejandre  Radiation Oncology  [address] [place] [address]  Phone: [phone]  Fax: [phone]  Established Patient  Follow Up Time: 1 week

## 2024-05-28 NOTE — PHYSICAL THERAPY INITIAL EVALUATION ADULT - NAME OF DISCHARGE PLANNER
FOLLOW UP VISIT      2020    Patient Name: JODEE ORTIZ  MRN:  [id number]  YOB: 1965  Diagnosis: Malignant neoplasm of cervix uteri, unspecified, C53.9      CURRENT HISTORY:   Patient presents for further follow-up regarding her history of cervical cancer.   In general she continues to tolerate therapy well.  She states her appetite is okay.  Denies nausea.  Has loose stools only after eating.  Denies significant discomfort from radiation.  She denies mouth sores. Has noted an altered taste.She is scheduled to have a sleeve placed on 2020.Further complete 10 point review of system is otherwise negative. ECOG performance status 1    TREATMENT HISTORY:  1. Weekly cisplatin x 5 with radiation therapy. Last infusion 9/15/2020  NARRATIVE:  The patient is a 54-year-old female who developed postmenopausal bleeding in 2020.  She had a history of prior Pap smears and underwent a LEEP in .  She subsequently had a lapse in care secondary to loss of insurance.  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  Tumor is focally positive for p53 and synaptophysin and negative for ER, CD10 and chromogranin.  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  Pap smear demonstrated adenocarcinoma, not otherwise specified, HR HPV 18+.  Patient was referred to see Dr. Boogie in gynecologic oncology.   Per her exam the cervix was noted to be nodular enlarged with blunting of the vaginal fornices.  Biopsy at the office and ECC both demonstrate poorly differentiated squamous cell carcinoma.  Pelvic MRI, PET/CT ordered and referrals placed to medical oncology and radiation oncology.  PAST MEDICAL HISTORY:  Hyperlipidemia, diabetes, arthritis, obesity  PAST SURGICAL HISTORY:  Status post LEEP ,  emergent via midline laparotomy    OBSTETRIC/GYNECOLOGIC HISTORY:   Menarche age 15  Luciano  6, para 6;  No prior hormone replacement therapy use.     CURRENT MEDICATIONS:  Medications, doses, and frequency reviewed with documentation in the electronic medical record.    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   She is a smoker.  She does use alcohol.     PHYSICAL EXAMINATION:  Vital signs:  . Today’s vital signs were reviewed in the electronic medical record.  Constitutional: Alert, cooperative.  Mood and affect appropriate. Appears close to chronological age. Well developed. Well nourished.   Head and face: Normocephalic, no scars.   Eyes:  Pupils equal and reactive; conjunctivae clear. Sclerae anicteric.    ENT:   .  Oropharynx not examined today due to mandatory masking and absence of symptoms with pandemic  Neck:  Supple with full range of motion.  No thyromegaly.  Hematologic/Lymphatic:  No palpable cervical, axillary, or inguinal lymphadenopathy. No petechiae or purpura.   Lungs:  Normal respiratory effort.  Lungs bilaterally clear to auscultation.    Cardiovascular: Regular  rate and rhythm  Abdomen:   .  Obese soft, nontender, nondistended without palpable masses.  No hepatosplenomegaly.  Back/spine: No spinal tenderness.  Musculoskeletal: No lymphedema. No clubbing.    Extremities: No calf tenderness. No peripheral edema.   Skin: No lesions suggestive of malignancy.  Psychiatric: Oriented to time, place, and person.  Coherent speech, Verbalizes understanding of our discussions today.     LABORATORY:  CBC, CMP, Magnesium Ordered today and reviewed as documented in the electronic medical record  PATHOLOGY:  2020:  A: Cervical biopsy 6:00: Poorly differentiated squamous cell carcinoma  B: Endocervical curettage: Poorly differentiated squamous cell carcinoma.    RADIOLOGY:  2020: MRI pelvis: Ill-defined lobular cervical mass with subtle areas of parametrial enhancement and  signal abnormality suggesting parametrial invasion.  Question postbiopsy inflammatory change versus possible extension to the pelvic sidewall.  Findings represent at least FIGO stage IIb stage cervical malignancy.  Question intermediate to low signal intensity at the anterior upper third of the vagina.  Complex right ovarian cystic mass with restricted diffusion and a punctate posterior enhancing nodule.  Findings are suspicious for right ovarian involvement and neoplasm.  Enlarged bilateral external iliac nodes.  Metastatic disease is not excluded.  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  Status post 5 weekly infusions of cisplatin with chemoradiation therapy. Patient is more thrombocytopenic today, platelet count 78,000.  As the patient only has 2 additional fractions of external beam prior to his sleeve placement we will not administer chemotherapy today.  2. Thrombocytopenia: Platelet count 78,000.  Secondary to chemotherapy.  Will monitor and anticipate improvement after completion of treatment.  3. Leukopenia: White blood cell count 2.1.  ANC 1500.  Again holding further chemotherapy.  4. Hypomagnesemia: Magnesium today 1.5.     5. Anemia: Hemoglobin today 11.0.  Stable. .Secondary to chemotherapy.  6. Hyperglycemia: Serum glucose today 270.  Secondary to known diabetes and steroid usage. Management by primary care physician  7. Antiemetics: She did not require PRN usage.  8. Diarrhea: Secondary to radiation.  Adequatelycontrolled, only twice per day  9. Right cystic adnexal lesion: Discussed at multidisciplinary tumor board.  Images were reviewed.  Consensus was to proceed with chemoradiation therapy and Not delay for surgical resection at this time. Will be monitored on further imaging.    10. Tobacco abuse: Has stopped smoking.   11. Diabetes, obesity: Management by primary care physician  PLANNED FOLLOW UP:  1 month       Signed Electronically by  Dior Sharif M.D  Hematology/Oncology  [facility]    cc: Freya Andrade M.D.

## 2024-05-28 NOTE — PROGRESS NOTE ADULT - SUBJECTIVE AND OBJECTIVE BOX
C/o SOB and nausea, weak, po intake is poor    Vital Signs Last 24 Hrs  T(C): 36.6 (05-28-24 @ 13:19), Max: 36.6 (05-28-24 @ 06:40)  T(F): 97.8 (05-28-24 @ 13:19), Max: 97.8 (05-28-24 @ 06:40)  HR: 70 (05-28-24 @ 17:25) (70 - 81)  BP: 104/63 (05-28-24 @ 17:25) (104/63 - 122/81)  RR: 18 (05-28-24 @ 17:25) (16 - 19)  SpO2: 100% (05-28-24 @ 17:25) (93% - 100%)    s1s2  b/l air entry  soft, ND  tr edema                                 11.6   6.38  )-----------( 165      ( 28 May 2024 06:47 )             34.3     28 May 2024 06:47    138    |  102    |  88     ----------------------------<  98     3.9     |  20     |  2.35     Ca    8.9        28 May 2024 06:47    TPro  7.3    /  Alb  3.2    /  TBili  2.9    /  DBili  x      /  AST  115    /  ALT  73     /  AlkPhos  372    28 May 2024 06:47    LIVER FUNCTIONS - ( 28 May 2024 06:47 )  Alb: 3.2 g/dL / Pro: 7.3 g/dL / ALK PHOS: 372 U/L / ALT: 73 U/L / AST: 115 U/L / GGT: x           acetaminophen     Tablet .. 650 milliGRAM(s) Oral every 6 hours PRN  aluminum hydroxide/magnesium hydroxide/simethicone Suspension 30 milliLiter(s) Oral every 4 hours PRN  aspirin enteric coated 81 milliGRAM(s) Oral daily  atorvastatin 80 milliGRAM(s) Oral at bedtime  carvedilol 3.125 milliGRAM(s) Oral every 12 hours  clopidogrel Tablet 75 milliGRAM(s) Oral daily  FLUoxetine 20 milliGRAM(s) Oral <User Schedule>  latanoprost 0.005% Ophthalmic Solution 1 Drop(s) Both EYES at bedtime  melatonin 3 milliGRAM(s) Oral at bedtime PRN  mirtazapine 15 milliGRAM(s) Oral at bedtime  ondansetron Injectable 4 milliGRAM(s) IV Push every 8 hours PRN  temazepam 15 milliGRAM(s) Oral at bedtime PRN    A/P:    CM, EF 25 - 30%, lung ca   Cardio-renal BRODY/CKD 3 (Cr 1.79 - 5/25/24)  CT A/P w/o hydro   UA negative 5/23/24  Bumex held  Would avoid ACE/ARB, entresto, farxiga given rising Cr  SOB iso CM, lung ca  F/u V/Q scan   Cards, Pulm f/u  CBC, BMP, SPEP in am  Avoid nephrotoxins  Consider repeat echo  Prognosis is guarded    [phone]

## 2024-05-28 NOTE — DISCHARGE NOTE NURSING/CASE MANAGEMENT/SOCIAL WORK - NSDCFUADDAPPT_GEN_ALL_CORE_FT
Follow up appointment with Dr. Boston [phone] on Follow up appointment with Dr. Boston [phone] on 05/31/24 at 8:15am.

## 2024-05-28 NOTE — DISCHARGE NOTE PROVIDER - NSDCHHASSISTILLNESS_GEN_ALL_CORE
Patient says her bladder infection is back after completing her antibiotics on Thursday.    Symptoms: urgency and burning    Patient denies fever and flank pain.    Reviewed care advice with caller per RN triage protocol guideline to complete virtual visit w/i 24 hrs.  Caller verbalized understanding and agrees with plan.        Additional Information    Negative: Shock suspected (e.g., cold/pale/clammy skin, too weak to stand, low BP, rapid pulse)    Negative: Sounds like a life-threatening emergency to the triager    Negative: [1] Unable to urinate (or only a few drops) > 4 hours AND [2] bladder feels very full (e.g., palpable bladder or strong urge to urinate)    Negative: Vomiting    Negative: Patient sounds very sick or weak to the triager    Negative: [1] SEVERE pain with urination  (e.g., excruciating) AND [2] not improved after 2 hours of pain medicine and Sitz bath    Negative: Fever > 100.5 F (38.1 C)    Negative: Side (flank) or lower back pain present    Negative: Diabetes mellitus or weak immune system (e.g., HIV positive, cancer chemo, splenectomy, organ transplant, chronic steroids)    Negative: Bedridden (e.g., nursing home patient, CVA, chronic illness, recovering from surgery)    Negative: Artificial heart valve or artificial joint    Negative: Unusual vaginal discharge (e.g., bad smelling, yellow, green, or foamy-white)    Negative: Patient is worried about sexually transmitted disease (STD)    Negative: Possibility of pregnancy    Negative: Blood in urine (red, pink, or tea-colored)    Age > 50 years    Protocols used: URINATION PAIN - FEMALE-A-       Fall risk/debility

## 2024-05-28 NOTE — DISCHARGE NOTE PROVIDER - CARE PROVIDERS DIRECT ADDRESSES
[email or web address]

## 2024-05-29 LAB
% ALBUMIN: 52.6 % — SIGNIFICANT CHANGE UP
% ALBUMIN: 54.5 % — SIGNIFICANT CHANGE UP
% ALPHA 1: 5 % — SIGNIFICANT CHANGE UP
% ALPHA 1: 5.1 % — SIGNIFICANT CHANGE UP
% ALPHA 2: 7.3 % — SIGNIFICANT CHANGE UP
% ALPHA 2: 8.1 % — SIGNIFICANT CHANGE UP
% BETA: 12.5 % — SIGNIFICANT CHANGE UP
% BETA: 13.1 % — SIGNIFICANT CHANGE UP
% GAMMA: 20.7 % — SIGNIFICANT CHANGE UP
% GAMMA: 21.1 % — SIGNIFICANT CHANGE UP
A1ACID GLYCOPROTEIN FLD-MCNC: 121 MG/DL — HIGH (ref 39–115)
ALBUMIN SERPL ELPH-MCNC: 3.1 G/DL — LOW (ref 3.3–5)
ALBUMIN SERPL ELPH-MCNC: 3.4 G/DL — LOW (ref 3.6–5.5)
ALBUMIN SERPL ELPH-MCNC: 3.8 G/DL — SIGNIFICANT CHANGE UP (ref 3.6–5.5)
ALBUMIN/GLOB SERPL ELPH: 1.1 RATIO — SIGNIFICANT CHANGE UP
ALBUMIN/GLOB SERPL ELPH: 1.2 RATIO — SIGNIFICANT CHANGE UP
ALP SERPL-CCNC: 365 U/L — HIGH (ref 40–120)
ALPHA1 GLOB SERPL ELPH-MCNC: 0.3 G/DL — SIGNIFICANT CHANGE UP (ref 0.1–0.4)
ALPHA1 GLOB SERPL ELPH-MCNC: 0.4 G/DL — SIGNIFICANT CHANGE UP (ref 0.1–0.4)
ALPHA2 GLOB SERPL ELPH-MCNC: 0.5 G/DL — SIGNIFICANT CHANGE UP (ref 0.5–1)
ALPHA2 GLOB SERPL ELPH-MCNC: 0.5 G/DL — SIGNIFICANT CHANGE UP (ref 0.5–1)
ALT FLD-CCNC: 88 U/L — HIGH (ref 10–45)
ANA TITR SER: NEGATIVE — SIGNIFICANT CHANGE UP
ANION GAP SERPL CALC-SCNC: 13 MMOL/L — SIGNIFICANT CHANGE UP (ref 5–17)
AST SERPL-CCNC: 110 U/L — HIGH (ref 10–40)
B-GLOBULIN SERPL ELPH-MCNC: 0.9 G/DL — SIGNIFICANT CHANGE UP (ref 0.5–1)
B-GLOBULIN SERPL ELPH-MCNC: 0.9 G/DL — SIGNIFICANT CHANGE UP (ref 0.5–1)
BILIRUB SERPL-MCNC: 3.1 MG/DL — HIGH (ref 0.2–1.2)
BUN SERPL-MCNC: 84 MG/DL — HIGH (ref 7–23)
CALCIUM SERPL-MCNC: 8.8 MG/DL — SIGNIFICANT CHANGE UP (ref 8.4–10.5)
CHLORIDE SERPL-SCNC: 101 MMOL/L — SIGNIFICANT CHANGE UP (ref 96–108)
CO2 SERPL-SCNC: 23 MMOL/L — SIGNIFICANT CHANGE UP (ref 22–31)
CREAT SERPL-MCNC: 2.18 MG/DL — HIGH (ref 0.5–1.3)
EGFR: 30 ML/MIN/1.73M2 — LOW
GAMMA GLOBULIN: 1.4 G/DL — SIGNIFICANT CHANGE UP (ref 0.6–1.6)
GAMMA GLOBULIN: 1.4 G/DL — SIGNIFICANT CHANGE UP (ref 0.6–1.6)
GLUCOSE SERPL-MCNC: 100 MG/DL — HIGH (ref 70–99)
HCT VFR BLD CALC: 34.1 % — LOW (ref 39–50)
HGB BLD-MCNC: 11.4 G/DL — LOW (ref 13–17)
INR BLD: 1.36 RATIO — HIGH (ref 0.85–1.18)
LKM AB SER-ACNC: <20.1 UNITS — SIGNIFICANT CHANGE UP (ref 0–20)
MAGNESIUM SERPL-MCNC: 2.5 MG/DL — SIGNIFICANT CHANGE UP (ref 1.6–2.6)
MCHC RBC-ENTMCNC: 31.8 PG — SIGNIFICANT CHANGE UP (ref 27–34)
MCHC RBC-ENTMCNC: 33.4 GM/DL — SIGNIFICANT CHANGE UP (ref 32–36)
MCV RBC AUTO: 95.3 FL — SIGNIFICANT CHANGE UP (ref 80–100)
NRBC # BLD: 0 /100 WBCS — SIGNIFICANT CHANGE UP (ref 0–0)
PHOSPHATE SERPL-MCNC: 4.3 MG/DL — SIGNIFICANT CHANGE UP (ref 2.5–4.5)
PLATELET # BLD AUTO: 139 K/UL — LOW (ref 150–400)
POTASSIUM SERPL-MCNC: 3.6 MMOL/L — SIGNIFICANT CHANGE UP (ref 3.5–5.3)
POTASSIUM SERPL-SCNC: 3.6 MMOL/L — SIGNIFICANT CHANGE UP (ref 3.5–5.3)
PROT PATTERN SERPL ELPH-IMP: SIGNIFICANT CHANGE UP
PROT PATTERN SERPL ELPH-IMP: SIGNIFICANT CHANGE UP
PROT SERPL-MCNC: 6.5 G/DL — SIGNIFICANT CHANGE UP (ref 6–8.3)
PROT SERPL-MCNC: 6.5 G/DL — SIGNIFICANT CHANGE UP (ref 6–8.3)
PROT SERPL-MCNC: 7 G/DL — SIGNIFICANT CHANGE UP (ref 6–8.3)
PROT SERPL-MCNC: 7 G/DL — SIGNIFICANT CHANGE UP (ref 6–8.3)
PROTHROM AB SERPL-ACNC: 15.8 SEC — HIGH (ref 9.5–13)
RBC # BLD: 3.58 M/UL — LOW (ref 4.2–5.8)
RBC # FLD: 17.3 % — HIGH (ref 10.3–14.5)
SODIUM SERPL-SCNC: 137 MMOL/L — SIGNIFICANT CHANGE UP (ref 135–145)
URATE SERPL-MCNC: 14.4 MG/DL — HIGH (ref 3.4–8.8)
WBC # BLD: 6.12 K/UL — SIGNIFICANT CHANGE UP (ref 3.8–10.5)
WBC # FLD AUTO: 6.12 K/UL — SIGNIFICANT CHANGE UP (ref 3.8–10.5)

## 2024-05-29 PROCEDURE — 99232 SBSQ HOSP IP/OBS MODERATE 35: CPT

## 2024-05-29 PROCEDURE — 93970 EXTREMITY STUDY: CPT | Mod: 26

## 2024-05-29 PROCEDURE — 78582 LUNG VENTILAT&PERFUS IMAGING: CPT | Mod: 26

## 2024-05-29 RX ORDER — ALBUTEROL 90 UG/1
2.5 AEROSOL, METERED ORAL ONCE
Refills: 0 | Status: COMPLETED | OUTPATIENT
Start: 2024-05-29 | End: 2024-05-29

## 2024-05-29 RX ORDER — BUMETANIDE 0.25 MG/ML
1 INJECTION INTRAMUSCULAR; INTRAVENOUS ONCE
Refills: 0 | Status: COMPLETED | OUTPATIENT
Start: 2024-05-29 | End: 2024-05-29

## 2024-05-29 RX ADMIN — MIRTAZAPINE 15 MILLIGRAM(S): 45 TABLET, ORALLY DISINTEGRATING ORAL at 21:25

## 2024-05-29 RX ADMIN — ALBUTEROL 2.5 MILLIGRAM(S): 90 AEROSOL, METERED ORAL at 15:32

## 2024-05-29 RX ADMIN — LATANOPROST 1 DROP(S): 0.05 SOLUTION/ DROPS OPHTHALMIC; TOPICAL at 21:25

## 2024-05-29 RX ADMIN — Medication 81 MILLIGRAM(S): at 12:09

## 2024-05-29 RX ADMIN — CLOPIDOGREL BISULFATE 75 MILLIGRAM(S): 75 TABLET, FILM COATED ORAL at 12:09

## 2024-05-29 RX ADMIN — TEMAZEPAM 15 MILLIGRAM(S): 15 CAPSULE ORAL at 21:25

## 2024-05-29 RX ADMIN — ATORVASTATIN CALCIUM 80 MILLIGRAM(S): 80 TABLET, FILM COATED ORAL at 21:25

## 2024-05-29 RX ADMIN — BUMETANIDE 1 MILLIGRAM(S): 0.25 INJECTION INTRAMUSCULAR; INTRAVENOUS at 15:37

## 2024-05-29 NOTE — PROGRESS NOTE ADULT - SUBJECTIVE AND OBJECTIVE BOX
Follow-up Pulmonary Progress Note  Chief Complaint : Hypotension    pt complain of dyspne on exertion   on 2 L sat 100%  seen prior to VQ scan  no cp, palp, n/v      No new events overnight.  Denies SOB/CP.       Allergies :No Known Allergies      PAST MEDICAL & SURGICAL HISTORY:  BPH (benign prostatic hyperplasia)  Bipolar disorder  Depression  Anxiety  Umbilical hernia, incarcerated  Depression  Constipation  Urinary retention  CAD (coronary artery disease)  SCC (squamous cell carcinoma)  Lung mass  Other nonspecific abnormal finding of lung field  LV (left ventricular) mural thrombus  History of inguinal hernia  History of CHF (congestive heart failure)  History of arthroscopy of knee Right, 1987  History of tonsillectomy 1952  History of hernia repair  H/O coronary angiogram    Medications:  MEDICATIONS  (STANDING):  aspirin enteric coated 81 milliGRAM(s) Oral daily  atorvastatin 80 milliGRAM(s) Oral at bedtime  carvedilol 3.125 milliGRAM(s) Oral every 12 hours  clopidogrel Tablet 75 milliGRAM(s) Oral daily  FLUoxetine 20 milliGRAM(s) Oral <User Schedule>  latanoprost 0.005% Ophthalmic Solution 1 Drop(s) Both EYES at bedtime  mirtazapine 15 milliGRAM(s) Oral at bedtime    MEDICATIONS  (PRN):  acetaminophen     Tablet .. 650 milliGRAM(s) Oral every 6 hours PRN Temp greater or equal to 38C (100.4F), Mild Pain (1 - 3)  aluminum hydroxide/magnesium hydroxide/simethicone Suspension 30 milliLiter(s) Oral every 4 hours PRN Dyspepsia  melatonin 3 milliGRAM(s) Oral at bedtime PRN Insomnia  ondansetron Injectable 4 milliGRAM(s) IV Push every 8 hours PRN Nausea and/or Vomiting  temazepam 15 milliGRAM(s) Oral at bedtime PRN Insomnia      Antibiotics History      Heme Medications   aspirin enteric coated 81 milliGRAM(s) Oral daily, 05-26-24 @ 16:32  clopidogrel Tablet 75 milliGRAM(s) Oral daily, 05-26-24 @ 16:34      GI Medications  aluminum hydroxide/magnesium hydroxide/simethicone Suspension 30 milliLiter(s) Oral every 4 hours, 05-26-24 @ 16:45, Routine PRN        LABS:                        11.4   6.12  )-----------( 139      ( 29 May 2024 06:54 )             34.1     05-29    137  |  101  |  84<H>  ----------------------------<  100<H>  3.6   |  23  |  2.18<H>    Ca    8.8      29 May 2024 06:54  Phos  4.3     05-29  Mg     2.5     05-29    TPro  7.0  /  Alb  3.1<L>  /  TBili  3.1<H>  /  DBili  x   /  AST  110<H>  /  ALT  88<H>  /  AlkPhos  365<H>  05-29    ADRIA Negative 05-24 @ 13:55  Anti SS-1 --  Anti SS-2 --  Anti RNP --  RF -- 05-24 @ 13:55    Atypical ANCA -- 05-24 @ 13:55  c-ANCA titer -- 05-24 @ 13:55  c-ANCA -- 05-24 @ 13:55  p-ANCA -- 05-24 @ 13:55  HIT ab -- 05-28 @ 19:40  HIT ab EIA --  D Dimer -[id number]             RADIOLOGY  CXR:    < from: Xray Chest 1 View- PORTABLE-Routine (05.28.24 @ 14:43) >  IMPRESSION:  1. There is a spindle shaped opacity in the left upper lobe which remains unchanged. This could be related to scarring, old infection however neoplasm cannot be excluded, including carcinoid.  2. Small right pleural effusion with tenting of the inferior right diaphragmatic pleura, similar to the prior exam.  3. Cardiomegaly without pulmonary edema.    --- End of Report ---    < end of copied text >    CT:    ECHO:    US  < from: US Duplex Venous Lower Ext Complete, Bilateral (05.29.24 @ 09:06) >  IMPRESSION:  No evidence of deep venous thrombosis in either lower extremity.        < end of copied text >      VITALS:  T(C): 36.3 (05-29-24 @ 05:00), Max: 36.6 (05-28-24 @ 13:19)  T(F): 97.3 (05-29-24 @ 05:00), Max: 97.8 (05-28-24 @ 13:19)  HR: 74 (05-29-24 @ 05:00) (70 - 80)  BP: 101/65 (05-29-24 @ 05:00) (101/65 - 113/67)  BP(mean): --  ABP: --  ABP(mean): --  RR: 17 (05-29-24 @ 05:00) (17 - 18)  SpO2: 93% (05-29-24 @ 05:00) (93% - 100%)  CVP(mm Hg): --  CVP(cm H2O): --    Ins and Outs       Height (cm): 182.9 (05-29-24 @ 07:59)  Weight (kg): 84.4 (05-29-24 @ 07:59)  BMI (kg/m2): 25.2 (05-29-24 @ 07:59)

## 2024-05-29 NOTE — PROGRESS NOTE ADULT - SUBJECTIVE AND OBJECTIVE BOX
Weak, on NC O2    Vital Signs Last 24 Hrs  T(C): 36.3 (05-29-24 @ 20:00), Max: 36.3 (05-29-24 @ 05:00)  T(F): 97.3 (05-29-24 @ 20:00), Max: 97.4 (05-29-24 @ 13:18)  HR: 71 (05-29-24 @ 20:00) (64 - 78)  BP: 112/65 (05-29-24 @ 20:00) (101/65 - 112/65)  RR: 16 (05-29-24 @ 20:00) (16 - 17)  SpO2: 95% (05-29-24 @ 20:00) (93% - 95%)    s1s2  b/l air entry  soft, ND  tr edema                                         11.4   6.12  )-----------( 139      ( 29 May 2024 06:54 )             34.1     29 May 2024 06:54    137    |  101    |  84     ----------------------------<  100    3.6     |  23     |  2.18     Ca    8.8        29 May 2024 06:54  Phos  4.3       29 May 2024 06:54  Mg     2.5       29 May 2024 06:54    TPro  7.0    /  Alb  3.1    /  TBili  3.1    /  DBili  x      /  AST  110    /  ALT  88     /  AlkPhos  365    29 May 2024 06:54    LIVER FUNCTIONS - ( 29 May 2024 06:54 )  Alb: 3.1 g/dL / Pro: 7.0 g/dL / ALK PHOS: 365 U/L / ALT: 88 U/L / AST: 110 U/L / GGT: x           PT/INR - ( 29 May 2024 06:54 )   PT: 15.8 sec;   INR: 1.36 ratio      acetaminophen     Tablet .. 650 milliGRAM(s) Oral every 6 hours PRN  aluminum hydroxide/magnesium hydroxide/simethicone Suspension 30 milliLiter(s) Oral every 4 hours PRN  aspirin enteric coated 81 milliGRAM(s) Oral daily  atorvastatin 80 milliGRAM(s) Oral at bedtime  carvedilol 3.125 milliGRAM(s) Oral every 12 hours  clopidogrel Tablet 75 milliGRAM(s) Oral daily  FLUoxetine 20 milliGRAM(s) Oral <User Schedule>  latanoprost 0.005% Ophthalmic Solution 1 Drop(s) Both EYES at bedtime  melatonin 3 milliGRAM(s) Oral at bedtime PRN  mirtazapine 15 milliGRAM(s) Oral at bedtime  ondansetron Injectable 4 milliGRAM(s) IV Push every 8 hours PRN  temazepam 15 milliGRAM(s) Oral at bedtime PRN    A/P:    CM, EF 25 - 30%, lung ca   Cardio-renal BRODY/CKD 3 (Cr 1.79 - 5/25/24)  CT A/P w/o hydro   UA negative 5/23/24  Bumex held  Would avoid ACE/ARB, entresto, farxiga given rising Cr  SOB iso CM, lung ca  V/Q scan w/low prob for PE  Cards, Pulm f/u  CBC, BMP, SPEP in am  Avoid nephrotoxins  Consider repeat echo  Cr is lower today  Can restart diuretics w/close f/u  Prognosis is guarded overall    [phone]

## 2024-05-29 NOTE — PROGRESS NOTE ADULT - ATTENDING COMMENTS
Admitted for dizziness, hypotension; found to have acute kidney injury on Chronic Kidney Disease.  V/Q scan low prob of PE  patient reports feeling better, wife at bedside. Saturated well on ambulation.   Clinically euvolemic but wife states patient has had fluid overload many times when diuretic held  plan to restart bumex at 1mg daily, expectant rise in Cr tomorrow

## 2024-05-29 NOTE — PROGRESS NOTE ADULT - ASSESSMENT
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% (Echo 4/2024), valvular disease (mild-moderate MR, moderate TR), CKD, anxiety/depression/bipolar disorder, history of lung cancer s/p RUL lobectomy (9/2022) with recurrent disease s/p radiation to left lung (completed 3/2023), recently admitted 4/6-4/10 for dizziness felt to be due to overdiuresis, who presents from home for weight gain, increased abdominal girth, and OGLESBY, in observation for:    #Acute Decompensated Heart Failure  #History of Combined Systolic and Diastolic HF with EF 25-30% (Echo 4/2024)  #History of CAD/Valvular Disease (Mild-Mod MR, Moderate TR)  Clinically Euvolemic  TTE 4/2024: EF 25-30%, grade1 diastolic dysfunction, Mild-moderate MR, Moderate TR  -cardiology consult apprec  -Palliative care consult  -Daily weight. Strict in's and out's, telemetry  -hold ARNI, mineralocorticoid antagonist, SGLT2 due to renal function - may resume as outpatient, renal function dependent  -c/w aspirin, plavix  -c/w statin  -c/w coreg 3.125 BID  -HOLD bumex 1mg BID due to BRODY  -f/u VQ scan     #BRODY on CKD stage III 2/2 possible cardio-renal syndrome  Baseline Cr ~1.3-1.5 range  -Nephrology Consult apprec  -Avoid nephrotoxic agents  -hold off on ARNI, mineralocorticoid antag, SGLT2   -HOLD bumex  -Monitor BMP    #Elevated T bili/Alk Phos/mild transaminitis  He has no abdominal pain, possibly due to hepatic congestion  Elevated GGT, elevated direct bili  CTAP: Cholelithiasis with  nonspecific gallbladder wall thickening, Small ascites.  CTAP with contrast unable to be performed to do renal function  -GI consult apprec: likely 2/2 hepatic congestion  -Monitor CMP    #elevated monocytes  #elevated kappa/Lambda gamma light chains  #normocytic anemia, ?anemia of chronic disease  Protein electrophoresis WLN  Iron studies WNL  -Hematology/oncology consult apprec  -f/u with blood smear (slide ready for review at the lab)  -will need oncology follow up outpatient     #Anxiety/Depression/Bipolar Disorder  -Continue prozac, mirtazapine    #BPH  -stop flomax due to hypotension    #History of Lung Cancer  #HARRY Lung Nodule  -s/p RUL lobectomy (9/2022) with recurrent disease s/p radiation to left lung (completed 3/2023)  -Recent PET showed The LEFT lung nodule does appear increasingly rounded solid, and although the degree of uptake there is essentially unchanged. The RIGHT lung appears to have slowly become denser over time, with the appearance of uptake there are also appearing slightly more prominent.   -Hematology/oncology consult apprec  -Follows with Dr. Alejandre for surveillance - f/u otpatient    #DVT PPx  -Heparin SC    Dispo: Home when optimized  Diet: DASH  FULL CODE, Palliative consult    Discussed with Dr. 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% (Echo 4/2024), valvular disease (mild-moderate MR, moderate TR), CKD, anxiety/depression/bipolar disorder, history of lung cancer s/p RUL lobectomy (9/2022) with recurrent disease s/p radiation to left lung (completed 3/2023), recently admitted 4/6-4/10 for dizziness felt to be due to overdiuresis, who presents from home for weight gain, increased abdominal girth, and OGLESBY, in observation for:    #History of Combined Systolic and Diastolic HF with EF 25-30% (Echo 4/2024)  #History of CAD/Valvular Disease (Mild-Mod MR, Moderate TR)  #r/o PE  Clinically Euvolemic, intermittent SOB  TTE 4/2024: EF 25-30%, grade1 diastolic dysfunction, Mild-moderate MR, Moderate TR  DDimer 682  -cardiology consult apprec  -Palliative care consult  -Daily weight. Strict in's and out's, telemetry  -hold ARNI, mineralocorticoid antagonist, SGLT2 due to renal function - may resume as outpatient, renal function dependent  -c/w aspirin, plavix  -c/w statin  -c/w coreg 3.125 BID  -HOLD bumex 1mg BID due to BRODY  -f/u VQ scan   -f/u b/l LE dopplers    #BRODY on CKD stage III 2/2 possible cardio-renal syndrome  Baseline Cr ~1.3-1.5 range  Improving Cr  -Nephrology Consult apprec  -Avoid nephrotoxic agents  -hold off on ARNI, mineralocorticoid antag, SGLT2   -HOLD bumex  -Monitor BMP    #Elevated T bili/Alk Phos/mild transaminitis  He has no abdominal pain, possibly due to hepatic congestion  Elevated GGT, elevated direct bili  CTAP: Cholelithiasis with  nonspecific gallbladder wall thickening, Small ascites.  CTAP with contrast unable to be performed to do renal function  -GI consult apprec: likely 2/2 hepatic congestion  -Monitor CMP    #elevated monocytes  #elevated kappa/Lambda gamma light chains  #normocytic anemia, ?anemia of chronic disease 2/2 lung cancer  Protein electrophoresis WLN  Iron studies WNL  -Hematology/oncology consult apprec  -f/u with blood smear (slide ready for review at the lab)  -will need oncology follow up outpatient     #Anxiety/Depression/Bipolar Disorder  -Continue prozac, mirtazapine    #BPH  -stop flomax due to hypotension    #History of Lung Cancer  #HARRY Lung Nodule  -s/p RUL lobectomy (9/2022) with recurrent disease s/p radiation to left lung (completed 3/2023)  -Recent PET showed The LEFT lung nodule does appear increasingly rounded solid, and although the degree of uptake there is essentially unchanged. The RIGHT lung appears to have slowly become denser over time, with the appearance of uptake there are also appearing slightly more prominent.   -Hematology/oncology consult apprec  -Follows with Dr. Alejandre for surveillance - f/u otpatient    #DVT PPx  -Heparin SC    Dispo: Home when optimized  Diet: DASH  FULL CODE, Palliative consult    Discussed with Dr. 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 CAD  #History of CAD/Valvular Disease (Mild-Mod MR, Moderate TR)  #r/o PE  Clinically Euvolemic, intermittent SOB  TTE 4/2024: EF 25-30%, grade1 diastolic dysfunction, Mild-moderate MR, Moderate TR  DDimer 682  NO DVT in b/l LE dopplers  -cardiology consult apprec  -Palliative care consult  -Daily weight. Strict in's and out's, telemetry  -hold ARNI, mineralocorticoid antagonist, SGLT2 due to renal function - may resume as outpatient, renal function dependent  -c/w aspirin, plavix  -c/w statin  -c/w coreg 3.125 BID  -HOLD bumex 1mg BID due to BRODY  -f/u VQ scan     #BRODY on CKD stage III 2/2 possible cardio-renal syndrome  Baseline Cr ~1.3-1.5 range  Improving Cr  -Nephrology Consult apprec  -Avoid nephrotoxic agents  -hold off on ARNI, mineralocorticoid antag, SGLT2   -HOLD bumex  -Monitor BMP    #Elevated T bili/Alk Phos/mild transaminitis  He has no abdominal pain, possibly due to hepatic congestion  Elevated GGT, elevated direct bili  CTAP: Cholelithiasis with  nonspecific gallbladder wall thickening, Small ascites.  CTAP with contrast unable to be performed to do renal function  -GI consult apprec: likely 2/2 hepatic congestion  -Monitor CMP    #elevated monocytes  #elevated kappa/Lambda gamma light chains  #normocytic anemia, ?anemia of chronic disease 2/2 lung cancer  Protein electrophoresis WLN  Iron studies WNL  -Hematology/oncology consult apprec  -f/u with blood smear (slide ready for review at the lab)  -will need oncology follow up outpatient     #Anxiety/Depression/Bipolar Disorder  -Continue prozac, mirtazapine    #BPH  -stop flomax due to hypotension    #History of Lung Cancer  #HARRY Lung Nodule  -s/p RUL lobectomy (9/2022) with recurrent disease s/p radiation to left lung (completed 3/2023)  -Recent PET showed The LEFT lung nodule does appear increasingly rounded solid, and although the degree of uptake there is essentially unchanged. The RIGHT lung appears to have slowly become denser over time, with the appearance of uptake there are also appearing slightly more prominent.   -Hematology/oncology consult apprec  -Follows with Dr. Alejandre for surveillance - f/u outpatient    #DVT PPx  -Heparin SC    Dispo: Home when optimized  Diet: DASH  FULL CODE, Palliative consult    Discussed with Dr. Sánchez

## 2024-05-29 NOTE — PROGRESS NOTE ADULT - SUBJECTIVE AND OBJECTIVE BOX
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  CT chest in early April 2024 showed left upper lobe nodule within the radiation field of more rounded contour, gradually becoming more discrete compared to prior studies.  Oncology consultation requested as above.    MEDICATIONS:  acetaminophen     Tablet .. 650 milliGRAM(s) Oral every 6 hours PRN  aluminum hydroxide/magnesium hydroxide/simethicone Suspension 30 milliLiter(s) Oral every 4 hours PRN  aspirin enteric coated 81 milliGRAM(s) Oral daily  atorvastatin 80 milliGRAM(s) Oral at bedtime  carvedilol 3.125 milliGRAM(s) Oral every 12 hours  clopidogrel Tablet 75 milliGRAM(s) Oral daily  FLUoxetine 20 milliGRAM(s) Oral <User Schedule>  latanoprost 0.005% Ophthalmic Solution 1 Drop(s) Both EYES at bedtime  melatonin 3 milliGRAM(s) Oral at bedtime PRN  mirtazapine 15 milliGRAM(s) Oral at bedtime  ondansetron Injectable 4 milliGRAM(s) IV Push every 8 hours PRN  temazepam 15 milliGRAM(s) Oral at bedtime PRN    All other medications reviewed.    SUBJECTIVE:  Events this AM noted; appears in NAD.    VITALS:  T(C): 36.3 (05-29-24 @ 05:00), Max: 36.6 (05-28-24 @ 13:19)  T(F): 97.3 (05-29-24 @ 05:00), Max: 97.8 (05-28-24 @ 13:19)  HR: 74 (05-29-24 @ 05:00) (70 - 81)  BP: 101/65 (05-29-24 @ 05:00) (101/65 - 113/67)      PHYSICAL EXAM:  Constitutional: alert, well developed  HEENT: normocephalic, anicteric sclerae, no mucositis or thrush  Respiratory: bilateral clear to auscultation anteriorly  Cardiovascular : S1, S2 regular, rhythmic, no murmurs, gallops or rubs  Abdomen: soft, distended, + normoactive BS, no palpable HS- megaly  Extremities: no tenderness;  -c/c/e, pulses equal bilaterally    LABS:  (05-28) WBC: 6.38 K/uL,Hemoglobin: 11.6 g/dL, Hematocrit: 34.3 %,    Platelet: 165 K/uL    RADIOLOGY:    ACC: [id number] EXAM:  CT CHEST   ORDERED BY: JASWINDER DUNBAR     PROCEDURE DATE:  04/01/2024          INTERPRETATION:  INDICATION: History of lung cancer treated with   stereotactic radiation to the left upper lobe in early 2023,   indeterminate findings on recent chest CT    TECHNIQUE: Helical acquisition images of the chest without intravenous   contrast. Maximum intensity projection images were generated.    COMPARISON: Most recent exam of PET/CT 1/14/2024.    FINDINGS:    LUNGS/AIRWAYS/PLEURA: Overall similar size of left lobe 2.3 cm solid   nodule within the radiation field, although now has a more rounded   contour (3-46), gradually becoming more discrete over multiple prior   studies. Right upper lobectomy. Unchanged mild indistinct groundglass in   the superior aspect of the right middle lobe (3-43). No pleural effusion.    LYMPH NODES/MEDIASTINUM: No lymphadenopathy.    HEART/VASCULATURE: Enlarged heart. No pericardial effusion. Coronary   artery calcifications. Normal caliber aorta.    UPPER ABDOMEN: Cholelithiasis. Unchanged subcentimeter hypodensity in the   liver, too small to characterize. Unchanged left adrenal nodule, likely   an adenoma. Partially included right renal cyst.    BONES/SOFT TISSUES: Unremarkable.        IMPRESSION:    Left upper lobe nodule within the radiation field has a more rounded   contour, and gradually become more discrete over multiple prior studies.   Neoplasm should be considered.

## 2024-05-29 NOTE — PROGRESS NOTE ADULT - SUBJECTIVE AND OBJECTIVE BOX
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    Overnight Events: SOB overnight  Interval History: Patient was seen and examined by me this morning at bedside. Continues to be intermittently SOB    REVIEW OF SYSTEMS:  CONSTITUTIONAL: No weakness, fevers or chills  EYES/ENT: No visual changes;  No vertigo or throat pain   NECK: No pain or stiffness  RESPIRATORY: No cough, wheezing, hemoptysis; No shortness of breath  CARDIOVASCULAR: No chest pain or palpitations  GASTROINTESTINAL: No abdominal or epigastric pain. No nausea, vomiting; No diarrhea or constipation.   GENITOURINARY: No dysuria, frequency or hematuria  NEUROLOGICAL: No numbness or weakness  SKIN: No itching, burning, rashes, or lesions     Vital Signs Last 24 Hrs  T(C): 36.3 (29 May 2024 05:00), Max: 36.6 (28 May 2024 13:19)  T(F): 97.3 (29 May 2024 05:00), Max: 97.8 (28 May 2024 13:19)  HR: 74 (29 May 2024 05:00) (70 - 81)  BP: 101/65 (29 May 2024 05:00) (101/65 - 113/67)  BP(mean): --  RR: 17 (29 May 2024 05:00) (17 - 18)  SpO2: 93% (29 May 2024 05:00) (93% - 100%)    Parameters below as of 29 May 2024 05:00  Patient On (Oxygen Delivery Method): nasal cannula  O2 Flow (L/min): 2      PHYSICAL EXAM  Constitutional: Pt lying in bed, awake and alert, NAD  HEENT: EOMI, normocephalic, moist mucous membranes  Neck: Soft and supple  Respiratory: Decreased breath sound right lower lobe, No wheezing, rales or rhonchi  Cardiovascular: S1S2+, no M/G/R  Gastrointestinal: BS+, soft, NT, ND no guarding, no rebound   Extremities: No calf pain, no edema, bilateral varicosities  Vascular: Peripheral pulses present, b/l LE varicosities  Neurological: AAOx3, no focal deficits  Musculoskeletal: Normal muscle tone, no atrophy, no rigidity, no contractions    MEDICATIONS  (STANDING):  aspirin enteric coated 81 milliGRAM(s) Oral daily  atorvastatin 80 milliGRAM(s) Oral at bedtime  clopidogrel Tablet 75 milliGRAM(s) Oral daily  heparin   Injectable 5000 Unit(s) SubCutaneous every 8 hours  latanoprost 0.005% Ophthalmic Solution 1 Drop(s) Both EYES at bedtime  metoprolol succinate ER 25 milliGRAM(s) Oral daily  mirtazapine 15 milliGRAM(s) Oral at bedtime  polyethylene glycol 3350 17 Gram(s) Oral two times a day  tamsulosin 0.8 milliGRAM(s) Oral <User Schedule>  tamsulosin 0.4 milliGRAM(s) Oral <User Schedule>    MEDICATIONS  (PRN):  acetaminophen     Tablet .. 650 milliGRAM(s) Oral every 6 hours PRN Temp greater or equal to 38C (100.4F), Mild Pain (1 - 3)  aluminum hydroxide/magnesium hydroxide/simethicone Suspension 30 milliLiter(s) Oral every 4 hours PRN Dyspepsia  melatonin 3 milliGRAM(s) Oral at bedtime PRN Insomnia  ondansetron Injectable 4 milliGRAM(s) IV Push every 8 hours PRN Nausea and/or Vomiting      LABS: All Labs Reviewed:                    RADIOLOGY/EKG (personally reviewed):  < from: 12 Lead ECG (05.26.24 @ 14:10) >  Diagnosis Line Sinus rhythm [id number] degree AV block with occasional premature ventricular complexes  Left axis deviation  Incomplete right bundle branch block  Anteroseptal infarct (cited on or before 11-MAY-2022)  Abnormal ECG  When compared with ECG of 22-MAY-2024 14:25,  No significant change was found  Confirmed by ALBIN COLON, JESSICA TABOR ([id number]) on 5/27/2024 8:14:20 AM    < end of copied text >  < from: Xray Chest 1 View- PORTABLE-Urgent (05.26.24 @ 14:52) >    IMPRESSION: Stable findings as above.    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    Overnight Events: SOB overnight  Interval History: Patient was seen and examined by me this morning at bedside. Continues to be intermittently SOB    REVIEW OF SYSTEMS:  CONSTITUTIONAL: No weakness, fevers or chills  EYES/ENT: No visual changes;  No vertigo or throat pain   NECK: No pain or stiffness  RESPIRATORY: No cough, wheezing, hemoptysis; No shortness of breath  CARDIOVASCULAR: No chest pain or palpitations  GASTROINTESTINAL: No abdominal or epigastric pain. No nausea, vomiting; No diarrhea or constipation.   GENITOURINARY: No dysuria, frequency or hematuria  NEUROLOGICAL: No numbness or weakness  SKIN: No itching, burning, rashes, or lesions     Vital Signs Last 24 Hrs  T(C): 36.3 (29 May 2024 05:00), Max: 36.6 (28 May 2024 13:19)  T(F): 97.3 (29 May 2024 05:00), Max: 97.8 (28 May 2024 13:19)  HR: 74 (29 May 2024 05:00) (70 - 81)  BP: 101/65 (29 May 2024 05:00) (101/65 - 113/67)  BP(mean): --  RR: 17 (29 May 2024 05:00) (17 - 18)  SpO2: 93% (29 May 2024 05:00) (93% - 100%)    Parameters below as of 29 May 2024 05:00  Patient On (Oxygen Delivery Method): nasal cannula  O2 Flow (L/min): 2      PHYSICAL EXAM  Constitutional: Pt lying in bed, awake and alert, NAD  HEENT: EOMI, normocephalic, moist mucous membranes  Neck: Soft and supple  Respiratory: Decreased breath sound right lower lobe, No wheezing, rales or rhonchi  Cardiovascular: S1S2+, no M/G/R  Gastrointestinal: BS+, soft, NT, ND no guarding, no rebound   Extremities: No calf pain, no edema, bilateral varicosities  Vascular: Peripheral pulses present, b/l LE varicosities  Neurological: AAOx3, no focal deficits  Musculoskeletal: Normal muscle tone, no atrophy, no rigidity, no contractions    MEDICATIONS  (STANDING):  aspirin enteric coated 81 milliGRAM(s) Oral daily  atorvastatin 80 milliGRAM(s) Oral at bedtime  clopidogrel Tablet 75 milliGRAM(s) Oral daily  heparin   Injectable 5000 Unit(s) SubCutaneous every 8 hours  latanoprost 0.005% Ophthalmic Solution 1 Drop(s) Both EYES at bedtime  metoprolol succinate ER 25 milliGRAM(s) Oral daily  mirtazapine 15 milliGRAM(s) Oral at bedtime  polyethylene glycol 3350 17 Gram(s) Oral two times a day  tamsulosin 0.8 milliGRAM(s) Oral <User Schedule>  tamsulosin 0.4 milliGRAM(s) Oral <User Schedule>    MEDICATIONS  (PRN):  acetaminophen     Tablet .. 650 milliGRAM(s) Oral every 6 hours PRN Temp greater or equal to 38C (100.4F), Mild Pain (1 - 3)  aluminum hydroxide/magnesium hydroxide/simethicone Suspension 30 milliLiter(s) Oral every 4 hours PRN Dyspepsia  melatonin 3 milliGRAM(s) Oral at bedtime PRN Insomnia  ondansetron Injectable 4 milliGRAM(s) IV Push every 8 hours PRN Nausea and/or Vomiting      LABS: All Labs Reviewed:                11.4                 137  | 23   | 84           6.12  >-----------< 139     ------------------------< 100                   34.1                 3.6  | 101  | 2.18                                         Ca 8.8   Mg 2.5   Ph 4.3    Urinalysis Basic - ( 29 May 2024 06:54 )    Color: x / Appearance: x / SG: x / pH: x  Gluc: 100 mg/dL / Ketone: x  / Bili: x / Urobili: x   Blood: x / Protein: x / Nitrite: x   Leuk Esterase: x / RBC: x / WBC x   Sq Epi: x / Non Sq Epi: x / Bacteria: x         RADIOLOGY/EKG (personally reviewed):  < from: 12 Lead ECG (05.26.24 @ 14:10) >  Diagnosis Line Sinus rhythm [id number] degree AV block with occasional premature ventricular complexes  Left axis deviation  Incomplete right bundle branch block  Anteroseptal infarct (cited on or before 11-MAY-2022)  Abnormal ECG  When compared with ECG of 22-MAY-2024 14:25,  No significant change was found  Confirmed by ALBIN COLON, JESSICA TABOR ([id number]) on 5/27/2024 8:14:20 AM    < end of copied text >  < from: Xray Chest 1 View- PORTABLE-Urgent (05.26.24 @ 14:52) >    IMPRESSION: Stable findings as above.    < end of copied text >

## 2024-05-29 NOTE — PROGRESS NOTE ADULT - ASSESSMENT
Physical Examination:  GENERAL:               Alert, Oriented, No acute distress.    HEENT:                      No JVD, Moist MM  PULM:                     Bilateral air entry, Clear to auscultation bilaterally, no significant sputum production, No Rales, No Rhonchi, No Wheezing  CVS:                         S1, S2,  +Murmur  ABD:                        Soft, nondistended, nontender, normoactive bowel sounds,   EXT:                         No edema, nontender, No Cyanosis or Clubbing    NEURO:                  Alert, oriented, interactive, nonfocal, follows commands  PSYC:                      Calm, + Insight.       Assessment  1. Dyspnea on exertion in patient with Systolic CHF  2. Increased LFTs and BRODY on CKD unsure of exact cause, Right heart dysfunction vs GB/Liver needs to be considerer  3. Never smoker with history of Lung cancer- now with upper lobe Nodule increasing rounded/solid with uptake on PET with ill defined right lower lobe with new focal uptake malignant involvement not excluded  4. Elevated Ddimer, LE Duplex no dvt  4. Underlying BPH, Bipolar d/o, Depression, Anxiety, CAD (coronary artery disease)    Plan  Taper n/c o2 to off, sat 99% on 2 L when seen with sat 100%  check O2 on room air rest and ambulation    GI f/u to see if role in repeat US RUQ  diuretics based on cardio/renal input    Elevated  ddimer with h/o malignancy and at risk for re occurrence would r/o VTE check VQ scan  can consider albuterol for SOB, unsure if will help         On D/c need to f/u for [facility] Alejandre for eval for above new lesions

## 2024-05-29 NOTE — CHART NOTE - NSCHARTNOTEFT_GEN_A_CORE
Following on DDimer    in progress Following on DDimer.    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    At bedside, patient reports mild SOB. Denies chills, headache, vision changes, chest pain, palpitations, cough, N/V.   Overnight patient remained hemodynamically stable. Early morning patient seen walking in hallway without discomfort or in distress.    -VS: 109/64, HR 73 (on monitor 75), SO2 100% on 2LNC, RR 18, T97.6    Physical exam:  GENERAL: in no acute distress, sitting in chair comfortably, later seen ambulating in hallway without discomfort  HEENT: No JVD, moist mucous membranes  PULM: Bilateral air entry, Clear to auscultation bilaterally. No Rales, No Rhonchi, No Wheezing  CVS: S1, S2, +Murmur  ABD: Soft, nondistended, nontender, normoactive bowel sounds,   EXT: No edema, nontender, No Cyanosis or Clubbing. Negative jeremie's b/l. Negative calf tenderness  NEURO: Alert, oriented, interactive, nonfocal, follows commands  PSYC: Calm, + Insight    Plan:  -Patient in no respiratory distress and hemodynamically stable with minimal SOB and no other complaints  -DDimmer 682 (395 ug/L age adjusted DDimer cutoff, DDU)  -Patient hemodynamically stable  -Continue with planned VQ scan  -LE Doppler ordered  -F/up am labs, added PT/PTT/INR  -Pending nephrology work up with guarded prognosis  -Heme/Onc following. Recent scans with concern for metastatic pulmonary neoplasm. S/P treatment with palliative RT, no systemic chemotherapy Following on DDimer.    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    At bedside, patient reports mild SOB. Denies chills, headache, vision changes, chest pain, palpitations, cough, N/V.   Overnight patient remained hemodynamically stable. Early morning patient seen walking in hallway without discomfort or in distress.    -VS: 109/64, HR 73 (on monitor 75), SO2 100% on 2LNC, RR 18, T97.6    Physical exam:  GENERAL: in no acute distress, sitting in chair comfortably, later seen ambulating in hallway without discomfort  HEENT: No JVD, moist mucous membranes  PULM: Bilateral air entry, Clear to auscultation bilaterally. No Rales, No Rhonchi, No Wheezing  CVS: S1, S2, +Murmur  ABD: Soft, nondistended, nontender, normoactive bowel sounds,   EXT: No edema, nontender, No Cyanosis or Clubbing. Negative jeremie's b/l. Negative calf tenderness  NEURO: Alert, oriented, interactive, nonfocal, follows commands  PSYC: Calm, + Insight    Plan:  -Patient in no respiratory distress and hemodynamically stable with minimal SOB and no other complaints  -DDimmer 682 (395 ug/L age adjusted DDimer cutoff, DDU)  -Continue with planned VQ scan  -LE Doppler ordered  -F/up am labs including coagulation studies  -Pending nephrology work up with guarded prognosis  -Heme/Onc following. Recent scans with concern for metastatic pulmonary neoplasm. S/P treatment with palliative RT, no systemic chemotherapy  -Patient is hemodynamically stable with no complaints. Await LE doppler, VQ scan and PT/PTT/INR prior to heparin initiation

## 2024-05-30 ENCOUNTER — NON-APPOINTMENT (OUTPATIENT)
Age: 79
End: 2024-05-30

## 2024-05-30 ENCOUNTER — APPOINTMENT (OUTPATIENT)
Dept: HEMATOLOGY ONCOLOGY | Facility: CLINIC | Age: 79
End: 2024-05-30

## 2024-05-30 VITALS
SYSTOLIC BLOOD PRESSURE: 114 MMHG | OXYGEN SATURATION: 97 % | HEART RATE: 78 BPM | TEMPERATURE: 97 F | DIASTOLIC BLOOD PRESSURE: 69 MMHG | RESPIRATION RATE: 16 BRPM

## 2024-05-30 DIAGNOSIS — R91.1 SOLITARY PULMONARY NODULE: ICD-10-CM

## 2024-05-30 DIAGNOSIS — C34.91 MALIGNANT NEOPLASM OF UNSPECIFIED PART OF RIGHT BRONCHUS OR LUNG: ICD-10-CM

## 2024-05-30 LAB
ALBUMIN SERPL ELPH-MCNC: 3.2 G/DL — LOW (ref 3.3–5)
ALP SERPL-CCNC: 422 U/L — HIGH (ref 40–120)
ALT FLD-CCNC: 102 U/L — HIGH (ref 10–45)
ANION GAP SERPL CALC-SCNC: 13 MMOL/L — SIGNIFICANT CHANGE UP (ref 5–17)
APTT BLD: 31.7 SEC — SIGNIFICANT CHANGE UP (ref 24.5–35.6)
AST SERPL-CCNC: 117 U/L — HIGH (ref 10–40)
BILIRUB SERPL-MCNC: 3 MG/DL — HIGH (ref 0.2–1.2)
BUN SERPL-MCNC: 82 MG/DL — HIGH (ref 7–23)
CALCIUM SERPL-MCNC: 9 MG/DL — SIGNIFICANT CHANGE UP (ref 8.4–10.5)
CHLORIDE SERPL-SCNC: 100 MMOL/L — SIGNIFICANT CHANGE UP (ref 96–108)
CO2 SERPL-SCNC: 25 MMOL/L — SIGNIFICANT CHANGE UP (ref 22–31)
CREAT SERPL-MCNC: 2.14 MG/DL — HIGH (ref 0.5–1.3)
EGFR: 31 ML/MIN/1.73M2 — LOW
GLUCOSE SERPL-MCNC: 102 MG/DL — HIGH (ref 70–99)
HCT VFR BLD CALC: 34.1 % — LOW (ref 39–50)
HGB BLD-MCNC: 11.1 G/DL — LOW (ref 13–17)
M PROTEIN 24H UR ELPH-MRATE: SIGNIFICANT CHANGE UP
MAGNESIUM SERPL-MCNC: 2.6 MG/DL — SIGNIFICANT CHANGE UP (ref 1.6–2.6)
MCHC RBC-ENTMCNC: 31.4 PG — SIGNIFICANT CHANGE UP (ref 27–34)
MCHC RBC-ENTMCNC: 32.6 GM/DL — SIGNIFICANT CHANGE UP (ref 32–36)
MCV RBC AUTO: 96.6 FL — SIGNIFICANT CHANGE UP (ref 80–100)
NRBC # BLD: 0 /100 WBCS — SIGNIFICANT CHANGE UP (ref 0–0)
PLATELET # BLD AUTO: 145 K/UL — LOW (ref 150–400)
POTASSIUM SERPL-MCNC: 3.4 MMOL/L — LOW (ref 3.5–5.3)
POTASSIUM SERPL-SCNC: 3.4 MMOL/L — LOW (ref 3.5–5.3)
PROT ?TM UR-MCNC: 20 MG/DL — HIGH (ref 0–12)
PROT SERPL-MCNC: 7.4 G/DL — SIGNIFICANT CHANGE UP (ref 6–8.3)
RBC # BLD: 3.53 M/UL — LOW (ref 4.2–5.8)
RBC # FLD: 17.3 % — HIGH (ref 10.3–14.5)
SODIUM SERPL-SCNC: 138 MMOL/L — SIGNIFICANT CHANGE UP (ref 135–145)
WBC # BLD: 5.35 K/UL — SIGNIFICANT CHANGE UP (ref 3.8–10.5)
WBC # FLD AUTO: 5.35 K/UL — SIGNIFICANT CHANGE UP (ref 3.8–10.5)

## 2024-05-30 PROCEDURE — 83615 LACTATE (LD) (LDH) ENZYME: CPT

## 2024-05-30 PROCEDURE — 99232 SBSQ HOSP IP/OBS MODERATE 35: CPT

## 2024-05-30 PROCEDURE — 36600 WITHDRAWAL OF ARTERIAL BLOOD: CPT

## 2024-05-30 PROCEDURE — 85027 COMPLETE CBC AUTOMATED: CPT

## 2024-05-30 PROCEDURE — 99239 HOSP IP/OBS DSCHRG MGMT >30: CPT

## 2024-05-30 PROCEDURE — 99285 EMERGENCY DEPT VISIT HI MDM: CPT

## 2024-05-30 PROCEDURE — 78582 LUNG VENTILAT&PERFUS IMAGING: CPT | Mod: MC

## 2024-05-30 PROCEDURE — 87637 SARSCOV2&INF A&B&RSV AMP PRB: CPT

## 2024-05-30 PROCEDURE — 83735 ASSAY OF MAGNESIUM: CPT

## 2024-05-30 PROCEDURE — 85730 THROMBOPLASTIN TIME PARTIAL: CPT

## 2024-05-30 PROCEDURE — 83521 IG LIGHT CHAINS FREE EACH: CPT

## 2024-05-30 PROCEDURE — 80053 COMPREHEN METABOLIC PANEL: CPT

## 2024-05-30 PROCEDURE — 85379 FIBRIN DEGRADATION QUANT: CPT

## 2024-05-30 PROCEDURE — A9540: CPT

## 2024-05-30 PROCEDURE — 83540 ASSAY OF IRON: CPT

## 2024-05-30 PROCEDURE — A9539: CPT

## 2024-05-30 PROCEDURE — 84156 ASSAY OF PROTEIN URINE: CPT

## 2024-05-30 PROCEDURE — 83880 ASSAY OF NATRIURETIC PEPTIDE: CPT

## 2024-05-30 PROCEDURE — 76705 ECHO EXAM OF ABDOMEN: CPT

## 2024-05-30 PROCEDURE — 84484 ASSAY OF TROPONIN QUANT: CPT

## 2024-05-30 PROCEDURE — 85610 PROTHROMBIN TIME: CPT

## 2024-05-30 PROCEDURE — 96374 THER/PROPH/DIAG INJ IV PUSH: CPT

## 2024-05-30 PROCEDURE — 82803 BLOOD GASES ANY COMBINATION: CPT

## 2024-05-30 PROCEDURE — 84155 ASSAY OF PROTEIN SERUM: CPT

## 2024-05-30 PROCEDURE — 84165 PROTEIN E-PHORESIS SERUM: CPT

## 2024-05-30 PROCEDURE — 71045 X-RAY EXAM CHEST 1 VIEW: CPT

## 2024-05-30 PROCEDURE — 36415 COLL VENOUS BLD VENIPUNCTURE: CPT

## 2024-05-30 PROCEDURE — 84550 ASSAY OF BLOOD/URIC ACID: CPT

## 2024-05-30 PROCEDURE — 93005 ELECTROCARDIOGRAM TRACING: CPT

## 2024-05-30 PROCEDURE — 84466 ASSAY OF TRANSFERRIN: CPT

## 2024-05-30 PROCEDURE — 85045 AUTOMATED RETICULOCYTE COUNT: CPT

## 2024-05-30 PROCEDURE — 82728 ASSAY OF FERRITIN: CPT

## 2024-05-30 PROCEDURE — 85025 COMPLETE CBC W/AUTO DIFF WBC: CPT

## 2024-05-30 PROCEDURE — 86325 OTHER IMMUNOELECTROPHORESIS: CPT

## 2024-05-30 PROCEDURE — 83550 IRON BINDING TEST: CPT

## 2024-05-30 PROCEDURE — 84100 ASSAY OF PHOSPHORUS: CPT

## 2024-05-30 PROCEDURE — 83010 ASSAY OF HAPTOGLOBIN QUANT: CPT

## 2024-05-30 PROCEDURE — 94640 AIRWAY INHALATION TREATMENT: CPT

## 2024-05-30 PROCEDURE — 93970 EXTREMITY STUDY: CPT

## 2024-05-30 RX ORDER — BUMETANIDE 0.25 MG/ML
2 INJECTION INTRAMUSCULAR; INTRAVENOUS
Qty: 14 | Refills: 0 | DISCHARGE
Start: 2024-05-30

## 2024-05-30 RX ORDER — CARVEDILOL PHOSPHATE 80 MG/1
1 CAPSULE, EXTENDED RELEASE ORAL
Qty: 60 | Refills: 0
Start: 2024-05-30 | End: 2024-06-28

## 2024-05-30 RX ORDER — POTASSIUM CHLORIDE 20 MEQ
20 PACKET (EA) ORAL
Refills: 0 | Status: COMPLETED | OUTPATIENT
Start: 2024-05-30 | End: 2024-05-30

## 2024-05-30 RX ORDER — BUMETANIDE 0.25 MG/ML
1 INJECTION INTRAMUSCULAR; INTRAVENOUS DAILY
Refills: 0 | Status: DISCONTINUED | OUTPATIENT
Start: 2024-05-31 | End: 2024-05-30

## 2024-05-30 RX ORDER — BUMETANIDE 0.25 MG/ML
1 INJECTION INTRAMUSCULAR; INTRAVENOUS ONCE
Refills: 0 | Status: COMPLETED | OUTPATIENT
Start: 2024-05-30 | End: 2024-05-30

## 2024-05-30 RX ORDER — BUMETANIDE 0.25 MG/ML
1 INJECTION INTRAMUSCULAR; INTRAVENOUS
Qty: 0 | Refills: 0 | DISCHARGE
Start: 2024-05-30

## 2024-05-30 RX ORDER — TAMSULOSIN HYDROCHLORIDE 0.4 MG/1
1 CAPSULE ORAL
Refills: 0 | DISCHARGE

## 2024-05-30 RX ORDER — BUMETANIDE 0.25 MG/ML
1 INJECTION INTRAMUSCULAR; INTRAVENOUS
Qty: 14 | Refills: 0
Start: 2024-05-30

## 2024-05-30 RX ORDER — CARVEDILOL PHOSPHATE 80 MG/1
1 CAPSULE, EXTENDED RELEASE ORAL
Qty: 0 | Refills: 0 | DISCHARGE
Start: 2024-05-30

## 2024-05-30 RX ADMIN — Medication 81 MILLIGRAM(S): at 11:57

## 2024-05-30 RX ADMIN — Medication 20 MILLIEQUIVALENT(S): at 14:16

## 2024-05-30 RX ADMIN — Medication 20 MILLIGRAM(S): at 06:12

## 2024-05-30 RX ADMIN — CARVEDILOL PHOSPHATE 3.12 MILLIGRAM(S): 80 CAPSULE, EXTENDED RELEASE ORAL at 18:24

## 2024-05-30 RX ADMIN — CLOPIDOGREL BISULFATE 75 MILLIGRAM(S): 75 TABLET, FILM COATED ORAL at 11:57

## 2024-05-30 RX ADMIN — Medication 20 MILLIEQUIVALENT(S): at 11:58

## 2024-05-30 RX ADMIN — BUMETANIDE 1 MILLIGRAM(S): 0.25 INJECTION INTRAMUSCULAR; INTRAVENOUS at 14:15

## 2024-05-30 NOTE — PROGRESS NOTE ADULT - SUBJECTIVE AND OBJECTIVE BOX
Feels better today, on NC O2    Vital Signs Last 24 Hrs  T(C): 36.2 (05-30-24 @ 12:30), Max: 36.3 (05-29-24 @ 20:00)  T(F): 97.2 (05-30-24 @ 12:30), Max: 97.3 (05-29-24 @ 20:00)  HR: 78 (05-30-24 @ 12:30) (65 - 78)  BP: 114/69 (05-30-24 @ 12:30) (105/70 - 114/69)  BP(mean): 82 (05-30-24 @ 05:32) (82 - 82)  RR: 16 (05-30-24 @ 12:30) (16 - 16)  SpO2: 97% (05-30-24 @ 12:30) (95% - 99%)    s1s2  b/l air entry  soft, ND  tr edema                                                 11.1   5.35  )-----------( 145      ( 30 May 2024 07:50 )             34.1     30 May 2024 07:50    138    |  100    |  82     ----------------------------<  102    3.4     |  25     |  2.14     Ca    9.0        30 May 2024 07:50  Phos  4.3       29 May 2024 06:54  Mg     2.6       30 May 2024 07:50    TPro  7.4    /  Alb  3.2    /  TBili  3.0    /  DBili  x      /  AST  117    /  ALT  102    /  AlkPhos  422    30 May 2024 07:50    LIVER FUNCTIONS - ( 30 May 2024 07:50 )  Alb: 3.2 g/dL / Pro: 7.4 g/dL / ALK PHOS: 422 U/L / ALT: 102 U/L / AST: 117 U/L / GGT: x           PT/INR - ( 29 May 2024 06:54 )   PT: 15.8 sec;   INR: 1.36 ratio      acetaminophen     Tablet .. 650 milliGRAM(s) Oral every 6 hours PRN  aluminum hydroxide/magnesium hydroxide/simethicone Suspension 30 milliLiter(s) Oral every 4 hours PRN  aspirin enteric coated 81 milliGRAM(s) Oral daily  atorvastatin 80 milliGRAM(s) Oral at bedtime  carvedilol 3.125 milliGRAM(s) Oral every 12 hours  clopidogrel Tablet 75 milliGRAM(s) Oral daily  FLUoxetine 20 milliGRAM(s) Oral <User Schedule>  latanoprost 0.005% Ophthalmic Solution 1 Drop(s) Both EYES at bedtime  melatonin 3 milliGRAM(s) Oral at bedtime PRN  mirtazapine 15 milliGRAM(s) Oral at bedtime  ondansetron Injectable 4 milliGRAM(s) IV Push every 8 hours PRN  temazepam 15 milliGRAM(s) Oral at bedtime PRN    A/P:    CM, EF 25 - 30%, lung ca   Cardio-renal BRODY/CKD 3 vs CKD progression   CT A/P w/o hydro   UA negative 5/23/24  Bumex held  Would avoid ACE/ARB, entresto, farxiga at this time   SOB iso CM, lung ca  V/Q scan w/low prob for PE  Avoid nephrotoxins, no NSAID's  Can restart diuretics w/close f/u  Cards, Pulm, renal f/u as op     [phone]

## 2024-05-30 NOTE — PROGRESS NOTE ADULT - SUBJECTIVE AND OBJECTIVE BOX
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  CT chest in early April 2024 showed left upper lobe nodule within the radiation field of more rounded contour, gradually becoming more discrete compared to prior studies.  Oncology consultation requested as above.    MEDICATIONS:  acetaminophen     Tablet .. 650 milliGRAM(s) Oral every 6 hours PRN  aluminum hydroxide/magnesium hydroxide/simethicone Suspension 30 milliLiter(s) Oral every 4 hours PRN  aspirin enteric coated 81 milliGRAM(s) Oral daily  atorvastatin 80 milliGRAM(s) Oral at bedtime  carvedilol 3.125 milliGRAM(s) Oral every 12 hours  clopidogrel Tablet 75 milliGRAM(s) Oral daily  FLUoxetine 20 milliGRAM(s) Oral <User Schedule>  latanoprost 0.005% Ophthalmic Solution 1 Drop(s) Both EYES at bedtime  melatonin 3 milliGRAM(s) Oral at bedtime PRN  mirtazapine 15 milliGRAM(s) Oral at bedtime  ondansetron Injectable 4 milliGRAM(s) IV Push every 8 hours PRN  temazepam 15 milliGRAM(s) Oral at bedtime PRN    All other medications reviewed.    SUBJECTIVE:  Appears weak, dyspneic at rest, continues O2 supplement via nasal cannula.     VITALS:  T(C): 36.3 (05-29-24 @ 05:00), Max: 36.6 (05-28-24 @ 13:19)  T(F): 97.3 (05-29-24 @ 05:00), Max: 97.8 (05-28-24 @ 13:19)  HR: 74 (05-29-24 @ 05:00) (70 - 81)  BP: 101/65 (05-29-24 @ 05:00) (101/65 - 113/67)      PHYSICAL EXAM:  Constitutional: alert, well developed  HEENT: normocephalic, anicteric sclerae, no mucositis or thrush  Respiratory: bilateral clear to auscultation anteriorly  Cardiovascular : S1, S2 regular, rhythmic, no murmurs, gallops or rubs  Abdomen: soft, distended, + normoactive BS, no palpable HS- megaly  Extremities: no tenderness;  -c/c/e, pulses equal bilaterally    LABS:  (05-28) WBC: 6.38 K/uL,Hemoglobin: 11.6 g/dL, Hematocrit: 34.3 %,    Platelet: 165 K/uL    RADIOLOGY:    ACC: [id number] EXAM:  CT CHEST   ORDERED BY: JASWINDER DUNBAR     PROCEDURE DATE:  04/01/2024          INTERPRETATION:  INDICATION: History of lung cancer treated with   stereotactic radiation to the left upper lobe in early 2023,   indeterminate findings on recent chest CT    TECHNIQUE: Helical acquisition images of the chest without intravenous   contrast. Maximum intensity projection images were generated.    COMPARISON: Most recent exam of PET/CT 1/14/2024.    FINDINGS:    LUNGS/AIRWAYS/PLEURA: Overall similar size of left lobe 2.3 cm solid   nodule within the radiation field, although now has a more rounded   contour (3-46), gradually becoming more discrete over multiple prior   studies. Right upper lobectomy. Unchanged mild indistinct groundglass in   the superior aspect of the right middle lobe (3-43). No pleural effusion.    LYMPH NODES/MEDIASTINUM: No lymphadenopathy.    HEART/VASCULATURE: Enlarged heart. No pericardial effusion. Coronary   artery calcifications. Normal caliber aorta.    UPPER ABDOMEN: Cholelithiasis. Unchanged subcentimeter hypodensity in the   liver, too small to characterize. Unchanged left adrenal nodule, likely   an adenoma. Partially included right renal cyst.    BONES/SOFT TISSUES: Unremarkable.        IMPRESSION:    Left upper lobe nodule within the radiation field has a more rounded   contour, and gradually become more discrete over multiple prior studies.   Neoplasm should be considered.

## 2024-05-30 NOTE — PROGRESS NOTE ADULT - ASSESSMENT
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 CAD  #History of CAD/Valvular Disease (Mild-Mod MR, Moderate TR)  Clinically Euvolemic, intermittent SOB  TTE 4/2024: EF 25-30%, grade1 diastolic dysfunction  NO DVT in b/l LE dopplers, VQ scan negative  -cardiology consult apprec  -Palliative care consult  -Daily weight. Strict in's and out's, telemetry  -hold ARNI, mineralocorticoid antagonist, SGLT2 due to renal function - may resume as outpatient, renal function dependent  -c/w aspirin, plavix  -c/w statin  -c/w coreg 3.125 BID  -bumex 1mg qd started last night      #BRODY on CKD stage III 2/2 possible cardio-renal syndrome  Baseline Cr ~1.3-1.5 range  Improving Cr  -Nephrology Consult apprec  -Avoid nephrotoxic agents  -hold off on ARNI, mineralocorticoid antag, SGLT2   -HOLD bumex  -Monitor BMP    #Elevated T bili/Alk Phos/mild transaminitis  He has no abdominal pain, possibly due to hepatic congestion  Elevated GGT, elevated direct bili  CTAP: Cholelithiasis with  nonspecific gallbladder wall thickening, Small ascites.  CTAP with contrast unable to be performed to do renal function  -GI consult apprec: likely 2/2 hepatic congestion  -Monitor CMP    #elevated monocytes  #elevated kappa/Lambda gamma light chains  #normocytic anemia, ?anemia of chronic disease 2/2 lung cancer  Protein electrophoresis WLN  Iron studies WNL  -Hematology/oncology consult apprec  -f/u with blood smear (slide ready for review at the lab)  -will need oncology follow up outpatient     #Anxiety/Depression/Bipolar Disorder  -Continue prozac, mirtazapine    #BPH  -stop flomax due to hypotension    #History of Lung Cancer  #HARRY Lung Nodule  -s/p RUL lobectomy (9/2022) with recurrent disease s/p radiation to left lung (completed 3/2023)  -Recent PET showed The LEFT lung nodule does appear increasingly rounded solid, and although the degree of uptake there is essentially unchanged. The RIGHT lung appears to have slowly become denser over time, with the appearance of uptake there are also appearing slightly more prominent.   -Hematology/oncology consult apprec  -Follows with Dr. Alejandre for surveillance - f/u outpatient    #DVT PPx  -Heparin SC    Dispo: Home when optimized  Diet: DASH  FULL CODE, Palliative consult    Discussed with Dr. 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 CAD  #History of CAD/Valvular Disease (Mild-Mod MR, Moderate TR)  Clinically Euvolemic, intermittent SOB  TTE 4/2024: EF 25-30%, grade1 diastolic dysfunction  NO DVT in b/l LE dopplers, VQ scan negative  -cardiology consult apprec  -Palliative care consult  -Daily weight. Strict in's and out's, telemetry  -hold ARNI, mineralocorticoid antagonist, SGLT2 due to renal function - may resume as outpatient, renal function dependent  -c/w aspirin, plavix  -c/w statin  -c/w coreg 3.125 BID  -bumex 1mg qd started last night    #BRODY on CKD stage III 2/2 possible cardio-renal syndrome  Baseline Cr ~1.3-1.5 range  Improving Cr  -Nephrology Consult apprec  -Avoid nephrotoxic agents  -hold off on ARNI, mineralocorticoid antag, SGLT2   -HOLD bumex  -Monitor BMP    #Elevated T bili/Alk Phos/mild transaminitis  He has no abdominal pain, possibly due to hepatic congestion  Elevated GGT, elevated direct bili  CTAP: Cholelithiasis with  nonspecific gallbladder wall thickening, Small ascites.  CTAP with contrast unable to be performed to do renal function  -GI consult apprec: likely 2/2 hepatic congestion  -Monitor CMP    #elevated monocytes  #elevated kappa/Lambda gamma light chains  #normocytic anemia, ?anemia of chronic disease 2/2 lung cancer  Protein electrophoresis WLN  Iron studies WNL  -Hematology/oncology consult apprec  -f/u with blood smear (slide ready for review at the lab)  -will need oncology follow up outpatient     #Anxiety/Depression/Bipolar Disorder  -Continue prozac, mirtazapine    #BPH  -stop flomax due to hypotension    #History of Lung Cancer  #HARRY Lung Nodule  -s/p RUL lobectomy (9/2022) with recurrent disease s/p radiation to left lung (completed 3/2023)  -Recent PET showed The LEFT lung nodule does appear increasingly rounded solid, and although the degree of uptake there is essentially unchanged. The RIGHT lung appears to have slowly become denser over time, with the appearance of uptake there are also appearing slightly more prominent.   -Hematology/oncology consult apprec  -Follows with Dr. Alejandre for surveillance - f/u outpatient    #DVT PPx  -Heparin SC    Dispo: Home when optimized  Diet: DASH  FULL CODE, Palliative consult    Discussed with Dr. 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 CAD  #History of CAD/Valvular Disease (Mild-Mod MR, Moderate TR)  Clinically Euvolemic, intermittent SOB  TTE 4/2024: EF 25-30%, grade1 diastolic dysfunction  NO DVT in b/l LE dopplers, VQ scan negative  -cardiology consult apprec  -Palliative care consult  -Daily weight. Strict in's and out's, telemetry  -hold ARNI, mineralocorticoid antagonist, SGLT2 due to renal function - may resume as outpatient, renal function dependent  -c/w aspirin, plavix  -c/w statin  -c/w coreg 3.125 BID  -bumex 1mg qd started last night    #BRODY on CKD stage III 2/2 possible cardio-renal syndrome  Baseline Cr ~1.3-1.5 range  Improving Cr  -Nephrology Consult apprec  -Avoid nephrotoxic agents  -hold off on ARNI, mineralocorticoid antag, SGLT2   -Monitor BMP    #Elevated T bili/Alk Phos/mild transaminitis  He has no abdominal pain, possibly due to hepatic congestion  Elevated GGT, elevated direct bili  CTAP: Cholelithiasis with  nonspecific gallbladder wall thickening, Small ascites.  CTAP with contrast unable to be performed to do renal function  -GI consult apprec: likely 2/2 hepatic congestion  -Monitor CMP    #elevated monocytes  #elevated kappa/Lambda gamma light chains  #normocytic anemia, ?anemia of chronic disease 2/2 lung cancer  Protein electrophoresis WLN  Iron studies WNL  -Hematology/oncology consult apprec  -f/u with blood smear (slide ready for review at the lab)  -will need oncology follow up outpatient     #Anxiety/Depression/Bipolar Disorder  -Continue prozac, mirtazapine    #BPH  -stop flomax due to hypotension    #History of Lung Cancer  #HARRY Lung Nodule  -s/p RUL lobectomy (9/2022) with recurrent disease s/p radiation to left lung (completed 3/2023)  -Recent PET showed The LEFT lung nodule does appear increasingly rounded solid, and although the degree of uptake there is essentially unchanged. The RIGHT lung appears to have slowly become denser over time, with the appearance of uptake there are also appearing slightly more prominent.   -Hematology/oncology consult apprec  -Follows with Dr. Alejandre for surveillance - f/u outpatient    #DVT PPx  -Heparin SC    Dispo: Home when optimized  Diet: DASH  FULL CODE, Palliative consult    Discussed with Dr. Sánchez

## 2024-05-30 NOTE — PROGRESS NOTE ADULT - SUBJECTIVE AND OBJECTIVE BOX
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    Overnight Events:None  Interval History: Patient was seen and examined by me this morning at bedside.     REVIEW OF SYSTEMS:  CONSTITUTIONAL: No weakness, fevers or chills  EYES/ENT: No visual changes;  No vertigo or throat pain   NECK: No pain or stiffness  RESPIRATORY: No cough, wheezing, hemoptysis; No shortness of breath  CARDIOVASCULAR: No chest pain or palpitations  GASTROINTESTINAL: No abdominal or epigastric pain. No nausea, vomiting; No diarrhea or constipation.   GENITOURINARY: No dysuria, frequency or hematuria  NEUROLOGICAL: No numbness or weakness  SKIN: No itching, burning, rashes, or lesions     Vital Signs Last 24 Hrs  T(C): 36.3 (30 May 2024 05:32), Max: 36.3 (29 May 2024 13:18)  T(F): 97.3 (30 May 2024 05:32), Max: 97.4 (29 May 2024 13:18)  HR: 65 (30 May 2024 05:32) (64 - 78)  BP: 105/70 (30 May 2024 05:32) (102/63 - 112/65)  BP(mean): 82 (30 May 2024 05:32) (82 - 82)  RR: 16 (30 May 2024 05:32) (16 - 16)  SpO2: 99% (30 May 2024 05:32) (95% - 99%)    Parameters below as of 30 May 2024 05:32  Patient On (Oxygen Delivery Method): nasal cannula  O2 Flow (L/min): 2      PHYSICAL EXAM  Constitutional: Pt lying in bed, awake and alert, NAD  HEENT: EOMI, normocephalic, moist mucous membranes  Neck: Soft and supple  Respiratory: Decreased breath sound right lower lobe, No wheezing, rales or rhonchi  Cardiovascular: S1S2+, no M/G/R  Gastrointestinal: BS+, soft, NT, ND no guarding, no rebound   Extremities: No calf pain, no edema, bilateral varicosities  Vascular: Peripheral pulses present, b/l LE varicosities  Neurological: AAOx3, no focal deficits  Musculoskeletal: Normal muscle tone, no atrophy, no rigidity, no contractions    MEDICATIONS  (STANDING):  aspirin enteric coated 81 milliGRAM(s) Oral daily  atorvastatin 80 milliGRAM(s) Oral at bedtime  clopidogrel Tablet 75 milliGRAM(s) Oral daily  heparin   Injectable 5000 Unit(s) SubCutaneous every 8 hours  latanoprost 0.005% Ophthalmic Solution 1 Drop(s) Both EYES at bedtime  metoprolol succinate ER 25 milliGRAM(s) Oral daily  mirtazapine 15 milliGRAM(s) Oral at bedtime  polyethylene glycol 3350 17 Gram(s) Oral two times a day  tamsulosin 0.8 milliGRAM(s) Oral <User Schedule>  tamsulosin 0.4 milliGRAM(s) Oral <User Schedule>    MEDICATIONS  (PRN):  acetaminophen     Tablet .. 650 milliGRAM(s) Oral every 6 hours PRN Temp greater or equal to 38C (100.4F), Mild Pain (1 - 3)  aluminum hydroxide/magnesium hydroxide/simethicone Suspension 30 milliLiter(s) Oral every 4 hours PRN Dyspepsia  melatonin 3 milliGRAM(s) Oral at bedtime PRN Insomnia  ondansetron Injectable 4 milliGRAM(s) IV Push every 8 hours PRN Nausea and/or Vomiting      LABS: All Labs Reviewed:                      RADIOLOGY/EKG (personally reviewed):  < from: 12 Lead ECG (05.26.24 @ 14:10) >  Diagnosis Line Sinus rhythm pfsu7al degree AV block with occasional premature ventricular complexes  Left axis deviation  Incomplete right bundle branch block  Anteroseptal infarct (cited on or before 11-MAY-2022)  Abnormal ECG  When compared with ECG of 22-MAY-2024 14:25,  No significant change was found  Confirmed by ALBIN COLON, JESSICA TABOR ([id number]) on 5/27/2024 8:14:20 AM    < end of copied text >  < from: Xray Chest 1 View- PORTABLE-Urgent (05.26.24 @ 14:52) >    IMPRESSION: Stable findings as above.    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    Overnight Events:None  Interval History: Patient was seen and examined by me this morning at bedside. Patient says he feels well this AM and ambulating with no problem. Says he feels ready to go home.     REVIEW OF SYSTEMS:  CONSTITUTIONAL: No weakness, fevers or chills  EYES/ENT: No visual changes;  No vertigo or throat pain   NECK: No pain or stiffness  RESPIRATORY: No cough, wheezing, hemoptysis; No shortness of breath  CARDIOVASCULAR: No chest pain or palpitations  GASTROINTESTINAL: No abdominal or epigastric pain. No nausea, vomiting; No diarrhea or constipation.   GENITOURINARY: No dysuria, frequency or hematuria  NEUROLOGICAL: No numbness or weakness  SKIN: No itching, burning, rashes, or lesions     Vital Signs Last 24 Hrs  T(C): 36.3 (30 May 2024 05:32), Max: 36.3 (29 May 2024 13:18)  T(F): 97.3 (30 May 2024 05:32), Max: 97.4 (29 May 2024 13:18)  HR: 65 (30 May 2024 05:32) (64 - 78)  BP: 105/70 (30 May 2024 05:32) (102/63 - 112/65)  BP(mean): 82 (30 May 2024 05:32) (82 - 82)  RR: 16 (30 May 2024 05:32) (16 - 16)  SpO2: 99% (30 May 2024 05:32) (95% - 99%)    Parameters below as of 30 May 2024 05:32  Patient On (Oxygen Delivery Method): nasal cannula  O2 Flow (L/min): 2      PHYSICAL EXAM  Constitutional: Pt lying in bed, awake and alert, NAD  HEENT: EOMI, normocephalic, moist mucous membranes  Neck: Soft and supple  Respiratory: Decreased breath sound right lower lobe (mildly improved), No wheezing, rales or rhonchi  Cardiovascular: S1S2+, no M/G/R  Gastrointestinal: BS+, soft, NT, ND no guarding, no rebound   Extremities: No calf pain, no edema, bilateral varicosities  Vascular: Peripheral pulses present, b/l LE varicosities  Neurological: AAOx3, no focal deficits  Musculoskeletal: Normal muscle tone, no atrophy, no rigidity, no contractions    MEDICATIONS  (STANDING):  aspirin enteric coated 81 milliGRAM(s) Oral daily  atorvastatin 80 milliGRAM(s) Oral at bedtime  clopidogrel Tablet 75 milliGRAM(s) Oral daily  heparin   Injectable 5000 Unit(s) SubCutaneous every 8 hours  latanoprost 0.005% Ophthalmic Solution 1 Drop(s) Both EYES at bedtime  metoprolol succinate ER 25 milliGRAM(s) Oral daily  mirtazapine 15 milliGRAM(s) Oral at bedtime  polyethylene glycol 3350 17 Gram(s) Oral two times a day  tamsulosin 0.8 milliGRAM(s) Oral <User Schedule>  tamsulosin 0.4 milliGRAM(s) Oral <User Schedule>    MEDICATIONS  (PRN):  acetaminophen     Tablet .. 650 milliGRAM(s) Oral every 6 hours PRN Temp greater or equal to 38C (100.4F), Mild Pain (1 - 3)  aluminum hydroxide/magnesium hydroxide/simethicone Suspension 30 milliLiter(s) Oral every 4 hours PRN Dyspepsia  melatonin 3 milliGRAM(s) Oral at bedtime PRN Insomnia  ondansetron Injectable 4 milliGRAM(s) IV Push every 8 hours PRN Nausea and/or Vomiting      LABS: All Labs Reviewed:                      RADIOLOGY/EKG (personally reviewed):  < from: 12 Lead ECG (05.26.24 @ 14:10) >  Diagnosis Line Sinus rhythm [id number] degree AV block with occasional premature ventricular complexes  Left axis deviation  Incomplete right bundle branch block  Anteroseptal infarct (cited on or before 11-MAY-2022)  Abnormal ECG  When compared with ECG of 22-MAY-2024 14:25,  No significant change was found  Confirmed by ALBIN COLON, JESSICA TABOR ([id number]) on 5/27/2024 8:14:20 AM    < end of copied text >  < from: Xray Chest 1 View- PORTABLE-Urgent (05.26.24 @ 14:52) >    IMPRESSION: Stable findings as above.    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    Overnight Events:None  Interval History: Patient was seen and examined by me this morning at bedside. Patient says he feels well this AM and ambulating with no problem. Says he feels ready to go home.     REVIEW OF SYSTEMS:  CONSTITUTIONAL: No weakness, fevers or chills  EYES/ENT: No visual changes;  No vertigo or throat pain   NECK: No pain or stiffness  RESPIRATORY: No cough, wheezing, hemoptysis; No shortness of breath  CARDIOVASCULAR: No chest pain or palpitations  GASTROINTESTINAL: No abdominal or epigastric pain. No nausea, vomiting; No diarrhea or constipation.   GENITOURINARY: No dysuria, frequency or hematuria  NEUROLOGICAL: No numbness or weakness  SKIN: No itching, burning, rashes, or lesions     Vital Signs Last 24 Hrs  T(C): 36.3 (30 May 2024 05:32), Max: 36.3 (29 May 2024 13:18)  T(F): 97.3 (30 May 2024 05:32), Max: 97.4 (29 May 2024 13:18)  HR: 65 (30 May 2024 05:32) (64 - 78)  BP: 105/70 (30 May 2024 05:32) (102/63 - 112/65)  BP(mean): 82 (30 May 2024 05:32) (82 - 82)  RR: 16 (30 May 2024 05:32) (16 - 16)  SpO2: 99% (30 May 2024 05:32) (95% - 99%)    Parameters below as of 30 May 2024 05:32  Patient On (Oxygen Delivery Method): nasal cannula  O2 Flow (L/min): 2      PHYSICAL EXAM  Constitutional: Pt lying in bed, awake and alert, NAD  HEENT: EOMI, normocephalic, moist mucous membranes  Neck: Soft and supple  Respiratory: Decreased breath sound right lower lobe (mildly improved), No wheezing, rales or rhonchi  Cardiovascular: S1S2+, no M/G/R  Gastrointestinal: BS+, soft, NT, ND no guarding, no rebound   Extremities: No calf pain, no edema, bilateral varicosities  Vascular: Peripheral pulses present, b/l LE varicosities  Neurological: AAOx3, no focal deficits  Musculoskeletal: Normal muscle tone, no atrophy, no rigidity, no contractions    MEDICATIONS  (STANDING):  aspirin enteric coated 81 milliGRAM(s) Oral daily  atorvastatin 80 milliGRAM(s) Oral at bedtime  clopidogrel Tablet 75 milliGRAM(s) Oral daily  heparin   Injectable 5000 Unit(s) SubCutaneous every 8 hours  latanoprost 0.005% Ophthalmic Solution 1 Drop(s) Both EYES at bedtime  metoprolol succinate ER 25 milliGRAM(s) Oral daily  mirtazapine 15 milliGRAM(s) Oral at bedtime  polyethylene glycol 3350 17 Gram(s) Oral two times a day  tamsulosin 0.8 milliGRAM(s) Oral <User Schedule>  tamsulosin 0.4 milliGRAM(s) Oral <User Schedule>    MEDICATIONS  (PRN):  acetaminophen     Tablet .. 650 milliGRAM(s) Oral every 6 hours PRN Temp greater or equal to 38C (100.4F), Mild Pain (1 - 3)  aluminum hydroxide/magnesium hydroxide/simethicone Suspension 30 milliLiter(s) Oral every 4 hours PRN Dyspepsia  melatonin 3 milliGRAM(s) Oral at bedtime PRN Insomnia  ondansetron Injectable 4 milliGRAM(s) IV Push every 8 hours PRN Nausea and/or Vomiting      LABS: All Labs Reviewed:                11.1                 138  | 25   | 82           5.35  >-----------< 145     ------------------------< 102                   34.1                 3.4  | 100  | 2.14                                         Ca 9.0   Mg 2.6   Ph x      Urinalysis Basic - ( 30 May 2024 07:50 )    Color: x / Appearance: x / SG: x / pH: x  Gluc: 102 mg/dL / Ketone: x  / Bili: x / Urobili: x   Blood: x / Protein: x / Nitrite: x   Leuk Esterase: x / RBC: x / WBC x   Sq Epi: x / Non Sq Epi: x / Bacteria: x                        RADIOLOGY/EKG (personally reviewed):  < from: 12 Lead ECG (05.26.24 @ 14:10) >  Diagnosis Line Sinus rhythm afke9sm degree AV block with occasional premature ventricular complexes  Left axis deviation  Incomplete right bundle branch block  Anteroseptal infarct (cited on or before 11-MAY-2022)  Abnormal ECG  When compared with ECG of 22-MAY-2024 14:25,  No significant change was found  Confirmed by ALBIN COLON, JESSICA S ([id number]) on 5/27/2024 8:14:20 AM    < end of copied text >  < from: Xray Chest 1 View- PORTABLE-Urgent (05.26.24 @ 14:52) >    IMPRESSION: Stable findings as above.    < end of copied text >

## 2024-05-30 NOTE — PROGRESS NOTE ADULT - ATTENDING COMMENTS
Feels well this morning  Denies pain, chest pain, SOB  received bumex yesterday  patient has follow up with cardiologist Dr Walden early next week - ARNI and Aldactone on hold due to renal insufficiency. Can be restarted outpatient if renal improvement

## 2024-05-30 NOTE — PROGRESS NOTE ADULT - PROVIDER SPECIALTY LIST ADULT
Cardiology
Nephrology
Family Medicine
Heme/Onc
Pulmonology
Family Medicine
Family Medicine
Heme/Onc
Family Medicine

## 2024-05-30 NOTE — PROGRESS NOTE ADULT - PROBLEM SELECTOR PLAN 1
Stage Ib (pT2a N0) right lung adenocarcinoma, s/p RUL lobectomy September 2022.  Likely metastatic disease per PET from 5/5/2024 showing new pleural effusions.   Patient has never been treated systemically, just local XRT.   Had US Doppler yesterday which revealed no evidence of DVT in either lower extremities.  Continue supportive care. Will need follow-up in ambulatory.
Stage Ib (pT2a N0) right lung adenocarcinoma, s/p RUL lobectomy September 2022, with most recent restaging  PET/CT on 5/5/2024 showing increasingly rounded solid left upper lobe pulmonary nodule suggestive of malignancy and an ill-defined hazy/ groundglass findings in the lateral right lower lobe also suggestive of malignancy.  Patient with new right pleural effusion experiencing dyspnea.  Earlier this morning presenting with episode of shortness of breath, short-lived, treated supportively.  Patient is not on systemic chemotherapy.  On recent scans patient presents with likely metastatic lung cancer.  Needs further oncologic evaluation if systemic therapy contemplated.  Will follow-up in ambulatory with his oncologist (Dr. Schwartz).  In the meantime treat supportively.

## 2024-05-31 ENCOUNTER — APPOINTMENT (OUTPATIENT)
Dept: FAMILY MEDICINE | Facility: CLINIC | Age: 79
End: 2024-05-31
Payer: MEDICARE

## 2024-05-31 VITALS
DIASTOLIC BLOOD PRESSURE: 74 MMHG | SYSTOLIC BLOOD PRESSURE: 112 MMHG | WEIGHT: 193 LBS | OXYGEN SATURATION: 99 % | RESPIRATION RATE: 20 BRPM | HEIGHT: 72 IN | BODY MASS INDEX: 26.14 KG/M2 | HEART RATE: 68 BPM

## 2024-05-31 PROCEDURE — 36415 COLL VENOUS BLD VENIPUNCTURE: CPT

## 2024-05-31 PROCEDURE — 99496 TRANSJ CARE MGMT HIGH F2F 7D: CPT

## 2024-05-31 RX ORDER — SPIRONOLACTONE 25 MG/1
25 TABLET ORAL
Qty: 30 | Refills: 5 | Status: COMPLETED | COMMUNITY
Start: 2024-03-07 | End: 2024-05-31

## 2024-05-31 RX ORDER — TAMSULOSIN HYDROCHLORIDE 0.4 MG/1
0.4 CAPSULE ORAL DAILY
Qty: 90 | Refills: 0 | Status: COMPLETED | COMMUNITY
Start: 2023-12-22 | End: 2024-05-31

## 2024-05-31 RX ORDER — FLUTICASONE PROPIONATE 50 UG/1
50 SPRAY, METERED NASAL DAILY
Qty: 1 | Refills: 5 | Status: COMPLETED | COMMUNITY
Start: 2024-04-17 | End: 2024-05-31

## 2024-05-31 RX ORDER — DOCUSATE SODIUM 100 MG/1
100 CAPSULE ORAL 3 TIMES DAILY
Qty: 90 | Refills: 3 | Status: COMPLETED | COMMUNITY
Start: 2024-03-07 | End: 2024-05-31

## 2024-05-31 RX ORDER — TEMAZEPAM 15 MG/1
15 CAPSULE ORAL
Refills: 0 | Status: ACTIVE | COMMUNITY

## 2024-05-31 RX ORDER — LATANOPROST/PF 0.005 %
0.01 DROPS OPHTHALMIC (EYE)
Refills: 0 | Status: ACTIVE | COMMUNITY

## 2024-05-31 RX ORDER — ROSUVASTATIN CALCIUM 20 MG/1
20 TABLET, FILM COATED ORAL AT BEDTIME
Refills: 0 | Status: ACTIVE | COMMUNITY

## 2024-05-31 NOTE — HISTORY OF PRESENT ILLNESS
[Post-hospitalization from ___ Hospital] : Post-hospitalization from [unfilled] Hospital [Admitted on: ___] : The patient was admitted on [unfilled] [Discharged on ___] : discharged on [unfilled] [Discharge Summary] : discharge summary [Pertinent Labs] : pertinent labs [Radiology Findings] : radiology findings [Discharge Med List] : discharge medication list [Other: ____] : [unfilled] [Med Reconciliation] : medication reconciliation has been completed [Patient Contacted By: ____] : and contacted by [unfilled] [FreeTextEntry2] : Presented to the ER with increased SOB, "dry-heaving;" noted to be in acute exacerbation of CHF - of note pt had just been discharged after treatment for same issues - responded to diuresis and was stable for discharge.   Cardiology F/U scheduled.

## 2024-05-31 NOTE — PHYSICAL EXAM
[No Acute Distress] : no acute distress [No Respiratory Distress] : no respiratory distress  [No Accessory Muscle Use] : no accessory muscle use [Normal] : normal rate, regular rhythm, normal S1 and S2 and no murmur heard [No Edema] : there was no peripheral edema [Soft] : abdomen soft [Non Tender] : non-tender [Normal Posterior Cervical Nodes] : no posterior cervical lymphadenopathy [Normal Anterior Cervical Nodes] : no anterior cervical lymphadenopathy [No Focal Deficits] : no focal deficits [Alert and Oriented x3] : oriented to person, place, and time [de-identified] : decreased BS R lung field; no rales appreciated

## 2024-06-01 LAB
ALBUMIN SERPL ELPH-MCNC: 4.1 G/DL
ALP BLD-CCNC: 461 U/L
ALT SERPL-CCNC: 91 U/L
ANION GAP SERPL CALC-SCNC: 16 MMOL/L
AST SERPL-CCNC: 114 U/L
BILIRUB SERPL-MCNC: 2.8 MG/DL
BUN SERPL-MCNC: 78 MG/DL
CALCIUM SERPL-MCNC: 9.3 MG/DL
CHLORIDE SERPL-SCNC: 99 MMOL/L
CO2 SERPL-SCNC: 22 MMOL/L
CREAT SERPL-MCNC: 2.08 MG/DL
EGFR: 32 ML/MIN/1.73M2
GLUCOSE SERPL-MCNC: 150 MG/DL
HCT VFR BLD CALC: 36.4 %
HGB BLD-MCNC: 11.5 G/DL
MCHC RBC-ENTMCNC: 30.5 PG
MCHC RBC-ENTMCNC: 31.6 GM/DL
MCV RBC AUTO: 96.6 FL
PLATELET # BLD AUTO: 167 K/UL
POTASSIUM SERPL-SCNC: 4.1 MMOL/L
PROT SERPL-MCNC: 7.2 G/DL
RBC # BLD: 3.77 M/UL
RBC # FLD: 17.3 %
SODIUM SERPL-SCNC: 136 MMOL/L
WBC # FLD AUTO: 5.99 K/UL

## 2024-06-04 ENCOUNTER — NON-APPOINTMENT (OUTPATIENT)
Age: 79
End: 2024-06-04

## 2024-06-05 ENCOUNTER — NON-APPOINTMENT (OUTPATIENT)
Age: 79
End: 2024-06-05

## 2024-06-05 ENCOUNTER — APPOINTMENT (OUTPATIENT)
Dept: CARDIOLOGY | Facility: CLINIC | Age: 79
End: 2024-06-05
Payer: MEDICARE

## 2024-06-05 VITALS
HEART RATE: 73 BPM | DIASTOLIC BLOOD PRESSURE: 65 MMHG | SYSTOLIC BLOOD PRESSURE: 101 MMHG | HEIGHT: 72 IN | WEIGHT: 189 LBS | BODY MASS INDEX: 25.6 KG/M2 | OXYGEN SATURATION: 99 %

## 2024-06-05 PROCEDURE — 93000 ELECTROCARDIOGRAM COMPLETE: CPT

## 2024-06-05 PROCEDURE — 99214 OFFICE O/P EST MOD 30 MIN: CPT

## 2024-06-06 ENCOUNTER — RESULT REVIEW (OUTPATIENT)
Age: 79
End: 2024-06-06

## 2024-06-06 ENCOUNTER — INPATIENT (INPATIENT)
Facility: HOSPITAL | Age: 79
LOS: 7 days | Discharge: HOME | DRG: 291 | End: 2024-06-14
Attending: INTERNAL MEDICINE | Admitting: INTERNAL MEDICINE
Payer: MEDICARE

## 2024-06-06 VITALS
HEIGHT: 72 IN | HEART RATE: 59 BPM | TEMPERATURE: 97 F | DIASTOLIC BLOOD PRESSURE: 58 MMHG | SYSTOLIC BLOOD PRESSURE: 86 MMHG | WEIGHT: 188.94 LBS | OXYGEN SATURATION: 98 % | RESPIRATION RATE: 18 BRPM

## 2024-06-06 DIAGNOSIS — Z98.890 OTHER SPECIFIED POSTPROCEDURAL STATES: Chronic | ICD-10-CM

## 2024-06-06 DIAGNOSIS — N17.9 ACUTE KIDNEY FAILURE, UNSPECIFIED: ICD-10-CM

## 2024-06-06 LAB
ALBUMIN SERPL ELPH-MCNC: 2.8 G/DL — LOW (ref 3.3–5)
ALBUMIN SERPL ELPH-MCNC: 2.9 G/DL — LOW (ref 3.3–5)
ALP SERPL-CCNC: 415 U/L — HIGH (ref 40–120)
ALP SERPL-CCNC: 444 U/L — HIGH (ref 40–120)
ALT FLD-CCNC: 134 U/L — HIGH (ref 10–45)
ALT FLD-CCNC: 140 U/L — HIGH (ref 10–45)
AMMONIA BLD-MCNC: 26 UMOL/L — SIGNIFICANT CHANGE UP (ref 11–55)
AMPHET UR-MCNC: NEGATIVE — SIGNIFICANT CHANGE UP
ANION GAP SERPL CALC-SCNC: 16 MMOL/L — SIGNIFICANT CHANGE UP (ref 5–17)
ANION GAP SERPL CALC-SCNC: 20 MMOL/L — HIGH (ref 5–17)
AST SERPL-CCNC: 176 U/L — HIGH (ref 10–40)
AST SERPL-CCNC: 177 U/L — HIGH (ref 10–40)
BARBITURATES UR SCN-MCNC: NEGATIVE — SIGNIFICANT CHANGE UP
BASE EXCESS BLDA CALC-SCNC: -12.3 MMOL/L — LOW (ref -2–3)
BASOPHILS # BLD AUTO: 0.02 K/UL — SIGNIFICANT CHANGE UP (ref 0–0.2)
BASOPHILS NFR BLD AUTO: 0.4 % — SIGNIFICANT CHANGE UP (ref 0–2)
BENZODIAZ UR-MCNC: POSITIVE
BILIRUB SERPL-MCNC: 3.4 MG/DL — HIGH (ref 0.2–1.2)
BILIRUB SERPL-MCNC: 3.4 MG/DL — HIGH (ref 0.2–1.2)
BLOOD GAS COMMENTS ARTERIAL: SIGNIFICANT CHANGE UP
BUN SERPL-MCNC: 116 MG/DL — HIGH (ref 7–23)
BUN SERPL-MCNC: 117 MG/DL — HIGH (ref 7–23)
CALCIUM SERPL-MCNC: 8.2 MG/DL — LOW (ref 8.4–10.5)
CALCIUM SERPL-MCNC: 8.3 MG/DL — LOW (ref 8.4–10.5)
CHLORIDE SERPL-SCNC: 96 MMOL/L — SIGNIFICANT CHANGE UP (ref 96–108)
CHLORIDE SERPL-SCNC: 96 MMOL/L — SIGNIFICANT CHANGE UP (ref 96–108)
CO2 BLDA-SCNC: 13 MMOL/L — LOW (ref 19–24)
CO2 SERPL-SCNC: 15 MMOL/L — LOW (ref 22–31)
CO2 SERPL-SCNC: 19 MMOL/L — LOW (ref 22–31)
COCAINE METAB.OTHER UR-MCNC: NEGATIVE — SIGNIFICANT CHANGE UP
CREAT SERPL-MCNC: 3.22 MG/DL — HIGH (ref 0.5–1.3)
CREAT SERPL-MCNC: 3.29 MG/DL — HIGH (ref 0.5–1.3)
EGFR: 18 ML/MIN/1.73M2 — LOW
EGFR: 19 ML/MIN/1.73M2 — LOW
EOSINOPHIL # BLD AUTO: 0.03 K/UL — SIGNIFICANT CHANGE UP (ref 0–0.5)
EOSINOPHIL NFR BLD AUTO: 0.6 % — SIGNIFICANT CHANGE UP (ref 0–6)
GAS PNL BLDA: SIGNIFICANT CHANGE UP
GLUCOSE SERPL-MCNC: 112 MG/DL — HIGH (ref 70–99)
GLUCOSE SERPL-MCNC: 93 MG/DL — SIGNIFICANT CHANGE UP (ref 70–99)
HCO3 BLDA-SCNC: 13 MMOL/L — LOW (ref 21–28)
HCT VFR BLD CALC: 34.8 % — LOW (ref 39–50)
HGB BLD-MCNC: 11.6 G/DL — LOW (ref 13–17)
HOROWITZ INDEX BLDA+IHG-RTO: 21 — SIGNIFICANT CHANGE UP
IMM GRANULOCYTES NFR BLD AUTO: 0.2 % — SIGNIFICANT CHANGE UP (ref 0–0.9)
LACTATE SERPL-SCNC: 1.9 MMOL/L — SIGNIFICANT CHANGE UP (ref 0.7–2)
LACTATE SERPL-SCNC: 2.7 MMOL/L — HIGH (ref 0.7–2)
LYMPHOCYTES # BLD AUTO: 0.61 K/UL — LOW (ref 1–3.3)
LYMPHOCYTES # BLD AUTO: 12.7 % — LOW (ref 13–44)
MAGNESIUM SERPL-MCNC: 2.8 MG/DL — HIGH (ref 1.6–2.6)
MAGNESIUM SERPL-MCNC: 2.9 MG/DL — HIGH (ref 1.6–2.6)
MCHC RBC-ENTMCNC: 30.9 PG — SIGNIFICANT CHANGE UP (ref 27–34)
MCHC RBC-ENTMCNC: 33.3 GM/DL — SIGNIFICANT CHANGE UP (ref 32–36)
MCV RBC AUTO: 92.6 FL — SIGNIFICANT CHANGE UP (ref 80–100)
METHADONE UR-MCNC: NEGATIVE — SIGNIFICANT CHANGE UP
MONOCYTES # BLD AUTO: 1.08 K/UL — HIGH (ref 0–0.9)
MONOCYTES NFR BLD AUTO: 22.5 % — HIGH (ref 2–14)
NEUTROPHILS # BLD AUTO: 3.04 K/UL — SIGNIFICANT CHANGE UP (ref 1.8–7.4)
NEUTROPHILS NFR BLD AUTO: 63.6 % — SIGNIFICANT CHANGE UP (ref 43–77)
NRBC # BLD: 0 /100 WBCS — SIGNIFICANT CHANGE UP (ref 0–0)
NT-PROBNP SERPL-SCNC: 5254 PG/ML — HIGH (ref 0–300)
OPIATES UR-MCNC: NEGATIVE — SIGNIFICANT CHANGE UP
PCO2 BLDA: 21 MMHG — LOW (ref 35–48)
PCP SPEC-MCNC: SIGNIFICANT CHANGE UP
PCP UR-MCNC: NEGATIVE — SIGNIFICANT CHANGE UP
PH BLDA: 7.39 — SIGNIFICANT CHANGE UP (ref 7.35–7.45)
PHOSPHATE SERPL-MCNC: 6.3 MG/DL — HIGH (ref 2.5–4.5)
PHOSPHATE SERPL-MCNC: 6.5 MG/DL — HIGH (ref 2.5–4.5)
PLATELET # BLD AUTO: 134 K/UL — LOW (ref 150–400)
PO2 BLDA: 117 MMHG — HIGH (ref 83–108)
POTASSIUM SERPL-MCNC: 3.8 MMOL/L — SIGNIFICANT CHANGE UP (ref 3.5–5.3)
POTASSIUM SERPL-MCNC: 3.9 MMOL/L — SIGNIFICANT CHANGE UP (ref 3.5–5.3)
POTASSIUM SERPL-SCNC: 3.8 MMOL/L — SIGNIFICANT CHANGE UP (ref 3.5–5.3)
POTASSIUM SERPL-SCNC: 3.9 MMOL/L — SIGNIFICANT CHANGE UP (ref 3.5–5.3)
PROT SERPL-MCNC: 6.6 G/DL — SIGNIFICANT CHANGE UP (ref 6–8.3)
PROT SERPL-MCNC: 6.7 G/DL — SIGNIFICANT CHANGE UP (ref 6–8.3)
RBC # BLD: 3.76 M/UL — LOW (ref 4.2–5.8)
RBC # FLD: 16.7 % — HIGH (ref 10.3–14.5)
SAO2 % BLDA: 98.7 % — HIGH (ref 94–98)
SODIUM SERPL-SCNC: 131 MMOL/L — LOW (ref 135–145)
SODIUM SERPL-SCNC: 131 MMOL/L — LOW (ref 135–145)
THC UR QL: NEGATIVE — SIGNIFICANT CHANGE UP
TROPONIN I, HIGH SENSITIVITY RESULT: 105.4 NG/L — HIGH
TROPONIN I, HIGH SENSITIVITY RESULT: 95.4 NG/L — HIGH
TROPONIN I, HIGH SENSITIVITY RESULT: 97.3 NG/L — HIGH
WBC # BLD: 4.79 K/UL — SIGNIFICANT CHANGE UP (ref 3.8–10.5)
WBC # FLD AUTO: 4.79 K/UL — SIGNIFICANT CHANGE UP (ref 3.8–10.5)

## 2024-06-06 PROCEDURE — 99223 1ST HOSP IP/OBS HIGH 75: CPT

## 2024-06-06 PROCEDURE — 99285 EMERGENCY DEPT VISIT HI MDM: CPT

## 2024-06-06 PROCEDURE — 70450 CT HEAD/BRAIN W/O DYE: CPT | Mod: 26,MC

## 2024-06-06 PROCEDURE — 93010 ELECTROCARDIOGRAM REPORT: CPT

## 2024-06-06 PROCEDURE — 99223 1ST HOSP IP/OBS HIGH 75: CPT | Mod: GC

## 2024-06-06 PROCEDURE — 99223 1ST HOSP IP/OBS HIGH 75: CPT | Mod: FS

## 2024-06-06 PROCEDURE — 99498 ADVNCD CARE PLAN ADDL 30 MIN: CPT

## 2024-06-06 PROCEDURE — 71045 X-RAY EXAM CHEST 1 VIEW: CPT | Mod: 26

## 2024-06-06 PROCEDURE — 99497 ADVNCD CARE PLAN 30 MIN: CPT | Mod: 25

## 2024-06-06 PROCEDURE — 93306 TTE W/DOPPLER COMPLETE: CPT | Mod: 26

## 2024-06-06 RX ORDER — NALOXONE HYDROCHLORIDE 4 MG/.1ML
2 SPRAY NASAL ONCE
Refills: 0 | Status: COMPLETED | OUTPATIENT
Start: 2024-06-06 | End: 2024-06-06

## 2024-06-06 RX ORDER — MIDODRINE HYDROCHLORIDE 2.5 MG/1
10 TABLET ORAL THREE TIMES A DAY
Refills: 0 | Status: DISCONTINUED | OUTPATIENT
Start: 2024-06-06 | End: 2024-06-06

## 2024-06-06 RX ORDER — CARVEDILOL PHOSPHATE 80 MG/1
3.12 CAPSULE, EXTENDED RELEASE ORAL DAILY
Refills: 0 | Status: DISCONTINUED | OUTPATIENT
Start: 2024-06-06 | End: 2024-06-07

## 2024-06-06 RX ORDER — LANOLIN ALCOHOL/MO/W.PET/CERES
3 CREAM (GRAM) TOPICAL AT BEDTIME
Refills: 0 | Status: DISCONTINUED | OUTPATIENT
Start: 2024-06-06 | End: 2024-06-14

## 2024-06-06 RX ORDER — ONDANSETRON 8 MG/1
4 TABLET, FILM COATED ORAL EVERY 8 HOURS
Refills: 0 | Status: DISCONTINUED | OUTPATIENT
Start: 2024-06-06 | End: 2024-06-14

## 2024-06-06 RX ORDER — CLOPIDOGREL BISULFATE 75 MG/1
75 TABLET, FILM COATED ORAL DAILY
Refills: 0 | Status: DISCONTINUED | OUTPATIENT
Start: 2024-06-06 | End: 2024-06-14

## 2024-06-06 RX ORDER — ASPIRIN/CALCIUM CARB/MAGNESIUM 324 MG
81 TABLET ORAL DAILY
Refills: 0 | Status: DISCONTINUED | OUTPATIENT
Start: 2024-06-06 | End: 2024-06-14

## 2024-06-06 RX ORDER — SODIUM BICARBONATE 1 MEQ/ML
650 SYRINGE (ML) INTRAVENOUS THREE TIMES A DAY
Refills: 0 | Status: DISCONTINUED | OUTPATIENT
Start: 2024-06-06 | End: 2024-06-12

## 2024-06-06 RX ORDER — ATORVASTATIN CALCIUM 80 MG/1
80 TABLET, FILM COATED ORAL AT BEDTIME
Refills: 0 | Status: DISCONTINUED | OUTPATIENT
Start: 2024-06-06 | End: 2024-06-07

## 2024-06-06 RX ORDER — FLUOXETINE HCL 10 MG
20 CAPSULE ORAL
Refills: 0 | Status: DISCONTINUED | OUTPATIENT
Start: 2024-06-08 | End: 2024-06-14

## 2024-06-06 RX ORDER — ACETAMINOPHEN 500 MG
650 TABLET ORAL EVERY 6 HOURS
Refills: 0 | Status: DISCONTINUED | OUTPATIENT
Start: 2024-06-06 | End: 2024-06-14

## 2024-06-06 RX ADMIN — CLOPIDOGREL BISULFATE 75 MILLIGRAM(S): 75 TABLET, FILM COATED ORAL at 12:41

## 2024-06-06 RX ADMIN — Medication 81 MILLIGRAM(S): at 12:41

## 2024-06-06 RX ADMIN — NALOXONE HYDROCHLORIDE 2 MILLIGRAM(S): 4 SPRAY NASAL at 01:00

## 2024-06-06 RX ADMIN — MIDODRINE HYDROCHLORIDE 10 MILLIGRAM(S): 2.5 TABLET ORAL at 01:17

## 2024-06-06 RX ADMIN — ATORVASTATIN CALCIUM 80 MILLIGRAM(S): 80 TABLET, FILM COATED ORAL at 22:36

## 2024-06-06 NOTE — ASSESSMENT
[FreeTextEntry1] : 10/10/2023  Of concern was a recent echo demonstrating an ejection fraction in the 15% range which renders him a candidate for ICD placement. I have encouraged John to follow-up with Dr. Mathews in order to be considered for ICD placement. His medications are reviewed and clearly vasodilators remain a challenge given relative hypotension and symptoms such as dizziness. In the meantime agree with plans for vigorous diuretic administration and even subcutaneous administration of furosemide. John is reluctant to go back in the hospital for obvious reasons.    6/5/2024  we will make Bumex half a tab daily restrict salt fluid resuscitate return visit 2 weeks, CHF evaluation 4 weeks Dr. Hunt, weight down from 187-184 follow weights carefully  I discussed with wife Kenia at bedside.  Medical necessity This is a high risk encounter based upon medication review recommendations for defibrillator and referral to EP service.

## 2024-06-06 NOTE — CONSULT NOTE ADULT - SUBJECTIVE AND OBJECTIVE BOX
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 Adm today w/lethargy and hypotension. Reported syncope event on 5/31, from a sitting position he fell forward and hit his chin and L arm, which are bruised. Pt w/decreased appetite and FTT. No fever, chills, chest pain, abdominal pain, vomiting, diarrhea or dysuria. Noted to have BRODY/CKD.     ROS:  as above    Allergies:  No Known Allergies    PAST MEDICAL & SURGICAL HISTORY:  BPH (benign prostatic hyperplasia)  Bipolar disorder  Depression  Anxiety  Umbilical hernia, incarcerated  Constipation  Urinary retention  CAD (coronary artery disease)  SCC (squamous cell carcinoma)  Lung mass  CHF, chronic  Heart failure with reduced ejection fraction  History of arthroscopy of knee  Right, 1987  History of tonsillectomy  1952  History of hernia repair  H/O coronary angiogram    SOCIAL HISTORY:  negative    FAMILY HISTORY:  depression (Mother)  CAD (coronary artery disease) (Sibling, Sibling)  diabetes mellitus (DM) (Sibling)    MEDICATIONS  (STANDING):  aspirin enteric coated 81 milliGRAM(s) Oral daily  atorvastatin 80 milliGRAM(s) Oral at bedtime  carvedilol 3.125 milliGRAM(s) Oral daily  clopidogrel Tablet 75 milliGRAM(s) Oral daily    Home Medications:  aspirin 81 mg oral delayed release tablet: 1 tab(s) orally once a day (06 Jun 2024 02:59)  clopidogrel 75 mg oral tablet: 1 tab(s) orally once a day (06 Jun 2024 02:59)  FLUoxetine (Eqv-PROzac) 20 mg oral tablet: 1 tab(s) orally every 3 days (06 Jun 2024 02:59)  latanoprost 0.005% ophthalmic solution: 1 drop(s) in each eye once a day (06 Jun 2024 02:59)  mirtazapine 15 mg oral tablet: 1 tab(s) orally once a day (at bedtime) (06 Jun 2024 02:59)  temazepam 15 mg oral capsule: 1 cap(s) orally once a day (at bedtime) as needed for  insomnia (06 Jun 2024 02:59)    s1s2  b/l air entry  soft, NT  + edema     LABS:                        11.6   4.79  )-----------( 134      ( 06 Jun 2024 01:00 )             34.8     06-06    131<L>  |  96  |  117<H>  ----------------------------<  93  3.9   |  15<L>  |  3.22<H>    Ca    8.2<L>      06 Jun 2024 08:32  Phos  6.5     06-06  Mg     2.9     06-06    TPro  6.6  /  Alb  2.8<L>  /  TBili  3.4<H>  /  DBili  x   /  AST  177<H>  /  ALT  140<H>  /  AlkPhos  415<H>  06-06    LIVER FUNCTIONS - ( 06 Jun 2024 08:32 )  Alb: 2.8 g/dL / Pro: 6.6 g/dL / ALK PHOS: 415 U/L / ALT: 140 U/L / AST: 177 U/L / GGT: x           A/P:    CM, EF < 20%, lung ca, mod MR, mod-severe TR  Cardio-renal BRODY/CKD 3 (Cr 2.14 - 5/30/24)   CT A/P w/o hydro 5/24/24  UA negative 5/23/24  SOB iso CM, lung ca  V/Q scan w/low prob for PE 5/29/24  Avoid nephrotoxins, no NSAID's  Overall options are limited and prognosis is guarded  Palliative eval is most appropriate     [phone]

## 2024-06-06 NOTE — H&P ADULT - HISTORY OF PRESENT ILLNESS
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 Pt was BIBEMS to  ED due to lethargy and hypotension tonight after taking temazepam, Prozac, flomax and Coreg. Of note, Pt had syncope event on 5/31 from a sitting position he fell forward and hit his chin and Lt arm, which are bruised. Pt accompanied by wife, who contributed most of the history, she state Pt has been slower and had decreased appetite in the last week after falling. Pt saw Dr. Walden today and Coreg was reduced by half but Pt has not started on regimen. Denies any further falls or head trauma. Denies fever, chills, chest pain, abdominal pain, vomiting, diarrhea or dysuria.     In the ED, VS T 97.1F, HR 59, BP 86/58, RR 18, SpO2 98% on NC. As per ED provider, Pt was lethargic with pinpoint pupils, given Narcan and he woke up and became more alert. Labs pertinent for Na 131, Cr 3.29, , bilirubin 3.4, Trop 105.4, Alk phos 444, , , Mg 2.8, Phosp 6.3, and BNP 5254. CT Head neg, CXR no effusion/PNA. Pt was given midodrine 10mg.     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 Pt was BIBEMS to  ED due to lethargy and hypotension tonight after taking temazepam, Prozac, flomax and Coreg. Of note, Pt had syncope event on 5/31 from a sitting position he fell forward and hit his chin and Lt arm, which are bruised. Pt accompanied by wife, who contributed most of the history, she state Pt has been slower and had decreased appetite in the last week after falling. Pt saw Dr. Walden today and Coreg was reduced by half but Pt has not started on regimen. Denies any further falls or head trauma. Denies fever, chills, chest pain, abdominal pain, vomiting, diarrhea or dysuria.     In the ED, VS T 97.1F, HR 59, BP 86/58, RR 18, SpO2 98% on NC. As per ED provider, Pt was lethargic with pinpoint pupils, given Narcan and he woke up and became more alert. Labs pertinent for Na 131, Cr 3.29, , bilirubin 3.4, Trop 105.4, Alk phos 444, , , Mg 2.8, Phosp 6.3, and BNP 5254. CT Head neg, CXR no effusion/PNA. Pt was given midodrine 10mg with improvement of /72.

## 2024-06-06 NOTE — PROGRESS NOTE ADULT - SUBJECTIVE AND OBJECTIVE BOX
Patient is a 79y old  Male who presents with a chief complaint of hypotension, elevated trop (06 Jun 2024 02:17)      INTERVAL HPI/OVERNIGHT EVENTS: Patient seen and examined at bedside. No overnight events.    MEDICATIONS  (STANDING):  aspirin enteric coated 81 milliGRAM(s) Oral daily  atorvastatin 80 milliGRAM(s) Oral at bedtime  clopidogrel Tablet 75 milliGRAM(s) Oral daily    MEDICATIONS  (PRN):  acetaminophen     Tablet .. 650 milliGRAM(s) Oral every 6 hours PRN Temp greater or equal to 38C (100.4F), Mild Pain (1 - 3)  aluminum hydroxide/magnesium hydroxide/simethicone Suspension 30 milliLiter(s) Oral every 4 hours PRN Dyspepsia  melatonin 3 milliGRAM(s) Oral at bedtime PRN Insomnia  ondansetron Injectable 4 milliGRAM(s) IV Push every 8 hours PRN Nausea and/or Vomiting      Allergies    No Known Allergies    Intolerances        REVIEW OF SYSTEMS:  CONSTITUTIONAL: No fever or chills  HEENT:  No headache, no sore throat  RESPIRATORY: No cough, wheezing, or shortness of breath  CARDIOVASCULAR: No chest pain, palpitations  GASTROINTESTINAL: No abd pain, nausea, vomiting, or diarrhea  GENITOURINARY: No dysuria, frequency, or hematuria  NEUROLOGICAL: no focal weakness or dizziness  MUSCULOSKELETAL: no myalgias     Vital Signs Last 24 Hrs  T(C): 36.5 (06 Jun 2024 05:06), Max: 36.9 (06 Jun 2024 05:06)  T(F): 97.7 (06 Jun 2024 05:06), Max: 98.4 (06 Jun 2024 05:06)  HR: 62 (06 Jun 2024 05:06) (59 - 71)  BP: 102/64 (06 Jun 2024 05:06) (86/58 - 108/72)  BP(mean): --  RR: 18 (06 Jun 2024 05:06) (16 - 18)  SpO2: 98% (06 Jun 2024 05:06) (98% - 98%)    Parameters below as of 06 Jun 2024 05:06  Patient On (Oxygen Delivery Method): room air      I&O's Summary    BMI (kg/m2): 25.6 (06-06-24 @ 00:33)      Physical Exam: Vital Signs Last 24 Hrs  T(C): 36.3 (06 Jun 2024 01:28), Max: 36.3 (06 Jun 2024 01:28)  T(F): 97.3 (06 Jun 2024 01:28), Max: 97.3 (06 Jun 2024 01:28)  HR: 71 (06 Jun 2024 03:00) (59 - 71)  BP: 108/72 (06 Jun 2024 03:00) (86/58 - 108/72)  BP(mean): --  RR: 17 (06 Jun 2024 03:00) (16 - 18)  SpO2: 98% (06 Jun 2024 03:00) (98% - 98%)    Parameters below as of 06 Jun 2024 03:00  Patient On (Oxygen Delivery Method): room air    PHYSICAL EXAM:  Constitutional: Pt lying in bed, NAD  HEENT: +b/l scleral icterus, EOMI, normal hearing, moist mucous membranes  Neck: Soft and supple, no JVD  Respiratory: CTABL, mild decrease breath sounds at Rt base, No wheezing, rales or rhonchi  Cardiovascular: S1S2+, RRR, no M/G/R  Gastrointestinal: BS+, soft, nontender, distended, +fluid wave, no guarding, no rebound, neg Velasco's sign  Extremities: +B/L pitting edema  Vascular: 2+ peripheral pulses  Neurological: AAOx3, no focal deficits  Musculoskeletal: moving all extremities  Skin: +bruises on chin and throat, +Lt eyelid bruise, +extensive bruise on Lt forearm, No rashes      LABS: Personally reviewed  CBC                        11.6   4.79  )-----------( 134      ( 06 Jun 2024 01:00 )             34.8     CMP  06-06    131  |  96  |  116  ----------------------------<  112  3.8   |  19  |  3.29    Ca    8.3      06 Jun 2024 01:00  Phos  6.3     06-06  Mg     2.8     06-06    TPro  6.7  /  Alb  2.9  /  TBili  3.4  /  DBili  x   /  AST  176  /  ALT  134  /  AlkPhos  444  06-06            Lactate, Blood: 1.9 mmol/L (06-06 @ 05:00)  Lactate, Blood: 2.7 mmol/L (06-06 @ 01:00)      CARDIAC MARKERS ( 06 Jun 2024 03:00 )  x     / 97.3 ng/L / x     / x     / x      CARDIAC MARKERS ( 06 Jun 2024 01:00 )  x     / 105.4 ng/L / x     / x     / x                ABG - ( 06 Jun 2024 01:20 )  pH, Arterial: 7.39  pH, Blood: x     /  pCO2: 21    /  pO2: 117   / HCO3: 13    / Base Excess: -12.3 /  SaO2: 98.7                        Urinalysis Basic - ( 06 Jun 2024 01:00 )    Color: x / Appearance: x / SG: x / pH: x  Gluc: 112 mg/dL / Ketone: x  / Bili: x / Urobili: x   Blood: x / Protein: x / Nitrite: x   Leuk Esterase: x / RBC: x / WBC x   Sq Epi: x / Non Sq Epi: x / Bacteria: x                RADIOLOGY & ADDITIONAL TESTS: Personally reviewed.     Consultant(s) Notes Reviewed:  [x] YES  [ ] NO   Discussed with LOPEZ/COURTNEY, RN     Patient is a 79y old  Male who presents with a chief complaint of hypotension, elevated trop (06 Jun 2024 02:17)      INTERVAL HPI/ OVERNIGHT EVENTS: Patient seen and examined at bedside. No overnight events.    MEDICATIONS  (STANDING):  aspirin enteric coated 81 milliGRAM(s) Oral daily  atorvastatin 80 milliGRAM(s) Oral at bedtime  clopidogrel Tablet 75 milliGRAM(s) Oral daily    MEDICATIONS  (PRN):  acetaminophen     Tablet .. 650 milliGRAM(s) Oral every 6 hours PRN Temp greater or equal to 38C (100.4F), Mild Pain (1 - 3)  aluminum hydroxide/magnesium hydroxide/simethicone Suspension 30 milliLiter(s) Oral every 4 hours PRN Dyspepsia  melatonin 3 milliGRAM(s) Oral at bedtime PRN Insomnia  ondansetron Injectable 4 milliGRAM(s) IV Push every 8 hours PRN Nausea and/or Vomiting      Allergies    No Known Allergies    Intolerances        REVIEW OF SYSTEMS:  CONSTITUTIONAL: No fever or chills  HEENT:  No headache, no sore throat  RESPIRATORY: No cough, wheezing, or shortness of breath  CARDIOVASCULAR: No chest pain, palpitations  GASTROINTESTINAL: No abd pain, nausea, vomiting, or diarrhea  GENITOURINARY: No dysuria, frequency, or hematuria  NEUROLOGICAL: no focal weakness or dizziness  MUSCULOSKELETAL: no myalgias     Vital Signs Last 24 Hrs  T(C): 36.5 (06 Jun 2024 05:06), Max: 36.9 (06 Jun 2024 05:06)  T(F): 97.7 (06 Jun 2024 05:06), Max: 98.4 (06 Jun 2024 05:06)  HR: 62 (06 Jun 2024 05:06) (59 - 71)  BP: 102/64 (06 Jun 2024 05:06) (86/58 - 108/72)  BP(mean): --  RR: 18 (06 Jun 2024 05:06) (16 - 18)  SpO2: 98% (06 Jun 2024 05:06) (98% - 98%)    Parameters below as of 06 Jun 2024 05:06  Patient On (Oxygen Delivery Method): room air      I&O's Summary    BMI (kg/m2): 25.6 (06-06-24 @ 00:33)      Physical Exam: Vital Signs Last 24 Hrs  T(C): 36.3 (06 Jun 2024 01:28), Max: 36.3 (06 Jun 2024 01:28)  T(F): 97.3 (06 Jun 2024 01:28), Max: 97.3 (06 Jun 2024 01:28)  HR: 71 (06 Jun 2024 03:00) (59 - 71)  BP: 108/72 (06 Jun 2024 03:00) (86/58 - 108/72)  BP(mean): --  RR: 17 (06 Jun 2024 03:00) (16 - 18)  SpO2: 98% (06 Jun 2024 03:00) (98% - 98%)    Parameters below as of 06 Jun 2024 03:00  Patient On (Oxygen Delivery Method): room air    PHYSICAL EXAM:  Constitutional: Pt lying in bed, NAD  HEENT: +b/l scleral icterus, EOMI, normal hearing, moist mucous membranes  Neck: Soft and supple, no JVD  Respiratory: CTABL, mild decrease breath sounds at Rt base, No wheezing, rales or rhonchi  Cardiovascular: S1S2+, RRR, no M/G/R  Gastrointestinal: BS+, soft, nontender, distended, +fluid wave, no guarding, no rebound, neg Velasco's sign  Extremities: +B/L pitting edema  Vascular: 2+ peripheral pulses  Neurological: AAOx3, no focal deficits  Musculoskeletal: moving all extremities  Skin: +bruises on chin and throat, +Lt eyelid bruise, +extensive bruise on Lt forearm, No rashes      LABS: Personally reviewed  CBC                        11.6   4.79  )-----------( 134      ( 06 Jun 2024 01:00 )             34.8     CMP  06-06    131  |  96  |  116  ----------------------------<  112  3.8   |  19  |  3.29    Ca    8.3      06 Jun 2024 01:00  Phos  6.3     06-06  Mg     2.8     06-06    TPro  6.7  /  Alb  2.9  /  TBili  3.4  /  DBili  x   /  AST  176  /  ALT  134  /  AlkPhos  444  06-06            Lactate, Blood: 1.9 mmol/L (06-06 @ 05:00)  Lactate, Blood: 2.7 mmol/L (06-06 @ 01:00)      CARDIAC MARKERS ( 06 Jun 2024 03:00 )  x     / 97.3 ng/L / x     / x     / x      CARDIAC MARKERS ( 06 Jun 2024 01:00 )  x     / 105.4 ng/L / x     / x     / x                ABG - ( 06 Jun 2024 01:20 )  pH, Arterial: 7.39  pH, Blood: x     /  pCO2: 21    /  pO2: 117   / HCO3: 13    / Base Excess: -12.3 /  SaO2: 98.7                        Urinalysis Basic - ( 06 Jun 2024 01:00 )    Color: x / Appearance: x / SG: x / pH: x  Gluc: 112 mg/dL / Ketone: x  / Bili: x / Urobili: x   Blood: x / Protein: x / Nitrite: x   Leuk Esterase: x / RBC: x / WBC x   Sq Epi: x / Non Sq Epi: x / Bacteria: x                RADIOLOGY & ADDITIONAL TESTS: Personally reviewed.     Consultant(s) Notes Reviewed:  [x] YES  [ ] NO   Discussed with LOPEZ/COURTNEY, RN

## 2024-06-06 NOTE — H&P ADULT - NSICDXPASTMEDICALHX_GEN_ALL_CORE_FT
PAST MEDICAL HISTORY:  Anxiety     Bipolar disorder     BPH (benign prostatic hyperplasia)     CAD (coronary artery disease)     CHF, chronic     Constipation     Depression     Depression     Heart failure with reduced ejection fraction     Lung mass     LV (left ventricular) mural thrombus     Other nonspecific abnormal finding of lung field     SCC (squamous cell carcinoma)     Umbilical hernia, incarcerated     Urinary retention      PAST MEDICAL HISTORY:  Anxiety     Bipolar disorder     BPH (benign prostatic hyperplasia)     CAD (coronary artery disease)     CHF, chronic     Constipation     Depression     Heart failure with reduced ejection fraction     Lung mass     SCC (squamous cell carcinoma)     Umbilical hernia, incarcerated     Urinary retention

## 2024-06-06 NOTE — ED CLERICAL - NS ED CLERK NOTE PRE-ARRIVAL INFORMATION; ADDITIONAL PRE-ARRIVAL INFORMATION
This patient is enrolled in the [facility] readmission reduction program and has active care navigation. This patient can be followed up by the care navigation team within 24 hours. To arrange close follow-up or to obtain additional clinical information about this patient, please call the contact number above.

## 2024-06-06 NOTE — ED PROVIDER NOTE - NS ED MD TWO NIGHTS YN
Patient's [facility] provider recommended patient to see Dr. Jon for sclerotherapy. Patient is wondering how often does a patient need to come in for the procedure, yearly? 3-6 months?    I informed patient that it depends on the patient and encouraged her to set an appointment for sclerotherapy consult.  Patient currently lives in [place].    She shared her mother had HHT. She has 5 siblings and all 5 of them have HHT; however, she is thankful it's not as sever where you have to get blood infusion.  She is interested in the procedure; however, next available appointment is not till December.  She will send referral and records and anticipate to see Dr. Jon in the spring unless she can be seen in the fall.  I did mention that Dr. Lau may be starting the HHT procedures and she was in agreement to see him too.    Patient was appreciative of the information.   Yes

## 2024-06-06 NOTE — CONSULT NOTE ADULT - NS PANP COMMENT GEN_ALL_CORE FT
Patient seen and evaluated independently.  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  His ejection fraction is 20% at best.  He is extremely weak but has minimal dyspnea.  His BUN and creatinine are elevated.    At this time ACE ARB and Arni are contraindicated due to his renal dysfunction.    He actually may benefit from digoxin but given markedly elevated BUN and creatinine will defer at least for the time being    Would place on carvedilol 3.125 mg ONCE /day in the morning.    Would start hydralazine 10 mg once per day in the evening.    Await oncology input.    Patient most probably at his age not a candidate for transplant, but might be a candidate for an LVAD    AICD most probably not indicated in this situation and in any event would be there for prevention of sudden death and not to improve patient's symptoms

## 2024-06-06 NOTE — CONSULT NOTE ADULT - CONVERSATION DETAILS
Spoke with the patient at the bedside with his wife, Kenia, on the phone. Patient states that they have been  and living in [place] 40 years. They do not have any children. Kenia states that patient's function has been declining for the past few months- unable to drive, is able to shower/eat independently but mainly staying at home, and her life as a caregiver is influenced as he is more dependent on her assistance(PPS 40-50%)     Patient and his wife understands that his heart failure is very advanced. Patient mentioned that he was told that he might need a pacemaker. Patient has hopes to be able to drive again but Kenia does not think this is achievable. Kenia mentioned that they would not want dialysis-" it's too much", does not want to make him 'suffer' but needs more clinical information to define how much they would like to pursue. Informed them that this is a good opportunity to set a realistic expectation and discuss his GOC and they voiced good understanding.

## 2024-06-06 NOTE — CONSULT NOTE ADULT - ASSESSMENT
Assessment 79-year-old male history CAD, HFrEF, CKD, lung CA with RUL lobectomy and radiation to the left lung, had 2 recent admissions this month for acute decompensated heart failure, and a recent episode of syncope.  Patient admitted due to lethargy and hypotension post polypharmacy intake.  Cardiology consulted for elevated troponin and hypotension ISO heart failure    Recommendations  EKG sinus rhythm, first-degree AV block with RBBB, no active ischemia  trop elevated and plateued, likely 2/2 demand +/- CKD  TTE from April 2024 with EF 25 to 30% and moderate TR  pt may benefit from adv HF interventions i.e. AICD, LVAD, pending clinical course. DW pt who would like to think about it  GDMT limited due to hypotension and kidney disease Assessment 79-year-old male history CAD, HFrEF, CKD, lung CA with RUL lobectomy and radiation to the left lung, had 2 recent admissions this month for acute decompensated heart failure, and a recent episode of syncope.  Patient admitted due to lethargy and hypotension post polypharmacy intake.  Cardiology consulted for elevated troponin and hypotension ISO heart failure    Recommendations  EKG sinus rhythm, first-degree AV block with RBBB, no active ischemia  trop elevated and plateued, likely 2/2 demand +/- CKD

## 2024-06-06 NOTE — PATIENT PROFILE ADULT - FALL HARM RISK - HARM RISK INTERVENTIONS

## 2024-06-06 NOTE — ED ADULT NURSE NOTE - OBJECTIVE STATEMENT
Pt presents to the ER for hypotension and lethargy.  As per wife the pt took his night time medications with a sleeping pill and was overly drowsy and had low blood pressure. Pt is drowsy but arousable.

## 2024-06-06 NOTE — CONSULT NOTE ADULT - SUBJECTIVE AND OBJECTIVE BOX
ELYSE MALAVE  [id number]      HPI:  79M PMH CAD, combined systolic and diastolic HF with EF 25-30% (Echo 4/2024), valvular disease), CKD, BPH, anxiety/depression/bipolar disorder, h/o lung cancer s/p RUL lobectomy (9/2022) with recurrent disease s/p radiation to left lung (completed 3/2023), recent admission on 5/22-5/25 and on 5/28-5/30 for acute decompensated heart failure. Pt was BIBEMS to  ED due to lethargy and hypotension tonight after taking temazepam, Prozac, flomax and Coreg. Of note, Pt had syncope event on 5/31 from a sitting position he fell forward and hit his chin and Lt arm, which are bruised. Denies fever, chills, chest pain, abdominal pain, vomiting, diarrhea or dysuria.     ALLERGIES:  No Known Allergies      PAST MEDICAL & SURGICAL HISTORY:  BPH (benign prostatic hyperplasia)      Bipolar disorder      Depression      Anxiety      Umbilical hernia, incarcerated      Constipation      Urinary retention      CAD (coronary artery disease)      SCC (squamous cell carcinoma)      Lung mass      CHF, chronic      Heart failure with reduced ejection fraction      History of arthroscopy of knee  Right, 1987      History of tonsillectomy  1952      History of hernia repair      H/O coronary angiogram            CURRENT MEDICATIONS:  MEDICATIONS  (STANDING):  aspirin enteric coated 81 milliGRAM(s) Oral daily  atorvastatin 80 milliGRAM(s) Oral at bedtime  clopidogrel Tablet 75 milliGRAM(s) Oral daily    MEDICATIONS  (PRN):  acetaminophen     Tablet .. 650 milliGRAM(s) Oral every 6 hours PRN Temp greater or equal to 38C (100.4F), Mild Pain (1 - 3)  aluminum hydroxide/magnesium hydroxide/simethicone Suspension 30 milliLiter(s) Oral every 4 hours PRN Dyspepsia  melatonin 3 milliGRAM(s) Oral at bedtime PRN Insomnia  ondansetron Injectable 4 milliGRAM(s) IV Push every 8 hours PRN Nausea and/or Vomiting      SOCIAL HISTORY:  denies    FAMILY HISTORY:  Family history of depression (Mother)    FH: CAD (coronary artery disease) (Sibling, Sibling)    Family history of diabetes mellitus (DM) (Sibling)        ROS:  All 10 systems reviewed and positives noted in HPI    OBJECTIVE:    VITAL SIGNS:  Vital Signs Last 24 Hrs  T(C): 36.5 (06 Jun 2024 05:06), Max: 36.9 (06 Jun 2024 05:06)  T(F): 97.7 (06 Jun 2024 05:06), Max: 98.4 (06 Jun 2024 05:06)  HR: 62 (06 Jun 2024 05:06) (59 - 71)  BP: 102/64 (06 Jun 2024 05:06) (86/58 - 108/72)  BP(mean): --  RR: 18 (06 Jun 2024 05:06) (16 - 18)  SpO2: 98% (06 Jun 2024 05:06) (98% - 98%)    Parameters below as of 06 Jun 2024 05:06  Patient On (Oxygen Delivery Method): room air        PHYSICAL EXAM:  General: weak  HEENT: ecchymosis right orbital region and chin  Neck: supple, no carotid bruits b/l  CVS: JVP ~ 7 cm H20, RRR, s1, s2, no murmurs/rubs/gallops  Chest: unlabored respirations, clear to auscultation b/l  Abdomen: non-distended  Extremities: mild lower extremity edema b/l  Neuro: awake, alert & oriented x 3    LABS:                        11.6   4.79  )-----------( 134      ( 06 Jun 2024 01:00 )             34.8     06-06    131<L>  |  96  |  117<H>  ----------------------------<  93  3.9   |  15<L>  |  3.22<H>    Ca    8.2<L>      06 Jun 2024 08:32  Phos  6.5     06-06  Mg     2.9     06-06    TPro  6.6  /  Alb  2.8<L>  /  TBili  3.4<H>  /  DBili  x   /  AST  177<H>  /  ALT  140<H>  /  AlkPhos  415<H>  06-06        ECG: Sinus 1st degree AV block, RBBB      TTE: < from: TTE Echo Complete w/o Contrast w/ Doppler (04.07.24 @ 11:12) >   1. Left ventricular ejection fraction, by visual estimation, is 25 to 30%.   2. Severely decreased global left ventricular systolic function.   3. Left atrial enlargement.   4. Increased LV wall thickness.   5. Spectral Dopplershows impaired relaxation pattern of left   ventricular myocardial filling (Grade I diastolic dysfunction).   6. There is mild concentric left ventricular hypertrophy.   7. Mildly enlarged right ventricle.   8. Right atrial enlargement.   9. Mild to moderate mitral valve regurgitation.  10. Moderate tricuspid regurgitation.  11. Mild aortic regurgitation.  12. Mild pulmonic valve regurgitation.

## 2024-06-06 NOTE — CONSULT NOTE ADULT - SUBJECTIVE AND OBJECTIVE BOX
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 Pt was BIBEMS to  ED due to lethargy and hypotension tonight after taking temazepam, Prozac, flomax and Coreg. Of note, Pt had syncope event on 5/31 from a sitting position he fell forward and hit his chin and Lt arm, which are bruised. Pt accompanied by wife, who contributed most of the history, she state Pt has been slower and had decreased appetite in the last week after falling. Pt saw Dr. Walden today and Coreg was reduced by half but Pt has not started on regimen. Denies any further falls or head trauma. Denies fever, chills, chest pain, abdominal pain, vomiting, diarrhea or dysuria.     In the ED, VS T 97.1F, HR 59, BP 86/58, RR 18, SpO2 98% on NC. As per ED provider, Pt was lethargic with pinpoint pupils, given Narcan and he woke up and became more alert. Labs pertinent for Na 131, Cr 3.29, , bilirubin 3.4, Trop 105.4, Alk phos 444, , , Mg 2.8, Phosp 6.3, and BNP 5254. CT Head neg, CXR no effusion/PNA. Pt was given midodrine 10mg with improvement of /72.     (06 Jun 2024 02:17)    PERTINENT PM/SXH:   BPH (benign prostatic hyperplasia)    Bipolar disorder    Depression    Anxiety    Umbilical hernia, incarcerated    Inguinal hernia of right side without obstruction or gangrene    Depression    Constipation    Urinary retention    CAD (coronary artery disease)    SCC (squamous cell carcinoma)    Lung mass    Other nonspecific abnormal finding of lung field    LV (left ventricular) mural thrombus    History of inguinal hernia    Severe left ventricular systolic dysfunction    History of CHF (congestive heart failure)    CHF, chronic    Heart failure with reduced ejection fraction      Anxiety    Depression    BPH (benign prostatic hyperplasia)    History of arthroscopy of knee    History of tonsillectomy    History of hernia repair    H/O coronary angiogram      FAMILY HISTORY:  Family history of depression (Mother)    FH: CAD (coronary artery disease) (Sibling, Sibling)    Family history of diabetes mellitus (DM) (Sibling)      Family Hx substance abuse [ ]yes [ ]no  ITEMS NOT CHECKED ARE NOT PRESENT    SOCIAL HISTORY:   Significant other/partner[ ]  Children[ ]  Judaism/Spirituality:  Substance hx:  [ ]   Tobacco hx:  [ ]   Alcohol hx: [ ]   Home Opioid hx:  [ ] I-Stop Reference No:  Living Situation: [X ]Home  [ ]Long term care  [ ]Rehab [ ]Other    ADVANCE DIRECTIVES:    DNR/MOLST  [ ]  Living Will  [ ]   DECISION MAKER(s):  [ ] Health Care Proxy(s)  [ X] Surrogate(s)  [ ] Guardian               BASELINE (I)ADL(s) (prior to admission):  [place]: [ ]Total  [ ] Moderate [X ]Dependent    Allergies    No Known Allergies    Intolerances    MEDICATIONS  (STANDING):  aspirin enteric coated 81 milliGRAM(s) Oral daily  atorvastatin 80 milliGRAM(s) Oral at bedtime  clopidogrel Tablet 75 milliGRAM(s) Oral daily    MEDICATIONS  (PRN):  acetaminophen     Tablet .. 650 milliGRAM(s) Oral every 6 hours PRN Temp greater or equal to 38C (100.4F), Mild Pain (1 - 3)  aluminum hydroxide/magnesium hydroxide/simethicone Suspension 30 milliLiter(s) Oral every 4 hours PRN Dyspepsia  melatonin 3 milliGRAM(s) Oral at bedtime PRN Insomnia  ondansetron Injectable 4 milliGRAM(s) IV Push every 8 hours PRN Nausea and/or Vomiting    PRESENT SYMPTOMS: [ ]Unable to self-report  [ ] CPOT [ ] PAINADs [ ] RDOS  Source if other than patient:  [ ]Family   [ ]Team     Pain: [ ]yes [X ]no  QOL impact -   Location -                    Aggravating factors -  Quality -  Radiation -  Timing-  Severity (0-10 scale):  Minimal acceptable level (0-10 scale):       Dyspnea:                           [ ]Mild [X ]Moderate [ ]Severe  Anxiety:                             [ ]Mild [ ]Moderate [ ]Severe  Depressed:  Fatigue:                             [ ]Mild [ ]Moderate [ ]Severe  Nausea:                             [ ]Mild [ ]Moderate [ ]Severe  Loss of appetite:              [ ]Mild [ ]Moderate [ ]Severe  Constipation:                    [ ]Mild [ ]Moderate [ ]Severe      Other Symptoms:  [ ]All other review of systems negative       Chaplaincy Referral: [ ] yes [ ] refused [ ] following [ ] Deferred   Palliative Performance Status Version 2:         %    http://npcrc.org/files/news/palliative_performance_scale_ppsv2.pdf    PHYSICAL EXAM:  Vital Signs Last 24 Hrs  T(C): 36.5 (06 Jun 2024 05:06), Max: 36.9 (06 Jun 2024 05:06)  T(F): 97.7 (06 Jun 2024 05:06), Max: 98.4 (06 Jun 2024 05:06)  HR: 62 (06 Jun 2024 05:06) (59 - 71)  BP: 102/64 (06 Jun 2024 05:06) (86/58 - 108/72)  BP(mean): --  RR: 18 (06 Jun 2024 05:06) (16 - 18)  SpO2: 98% (06 Jun 2024 05:06) (98% - 98%)    Parameters below as of 06 Jun 2024 05:06  Patient On (Oxygen Delivery Method): room air     I&O's Summary    GENERAL: [ ]Cachexia    [X ]Alert  [X ]Oriented x 3  [ ]Lethargic  [ ]Unarousable  [ ]Verbal  [ ]Non-Verbal  Behavioral:   [ ] Anxiety  [ ] Delirium [ ] Agitation [ ] Other  HEENT:  [ ]Normal   [ ]Dry mouth   [ ]ET Tube/Trach  [ ]Oral lesions  PULMONARY:   [ ]Clear [ ]Tachypnea  [ ]Audible excessive secretions   [ ]Rhonchi        [ ]Right [ ]Left [ ]Bilateral  [ ]Crackles        [ ]Right [ ]Left [ ]Bilateral  [ ]Wheezing     [ ]Right [ ]Left [ ]Bilateral  [ ]Diminished breath sounds [ ]right [ ]left [ ]bilateral  CARDIOVASCULAR:    [ ]Regular [ ]Irregular [ ]Tachy  [ ]Christiano [ ]Murmur [ ]Other  GASTROINTESTINAL:  [ ]Soft  [ ]Distended   [ ]+BS  [ ]Non tender [ ]Tender  [ ]Other [ ]PEG [ ]OGT/ NGT  Last BM:  GENITOURINARY:  [ ]Normal [ ] Incontinent   [ ]Oliguria/Anuria   [ ]Chandler  MUSCULOSKELETAL:   [ ]Normal   [ ]Weakness  [ ]Bed/Wheelchair bound [ ]Edema  NEUROLOGIC:   [X ]No focal deficits  [ ]Cognitive impairment  [ ]Dysphagia [ ]Dysarthria [ ]Paresis [ ]Other   SKIN:   [ ]Normal  [ ]Rash  [ ]Other  [ ]Pressure ulcer(s)       Present on admission [ ]y [ ]n    CRITICAL CARE:  [ ] Shock Present  [ ]Septic [ ]Cardiogenic [ ]Neurologic [ ]Hypovolemic  [ ]  Vasopressors [ ]  Inotropes   [ ]Respiratory failure present [ ]Mechanical ventilation [ ]Non-invasive ventilatory support [ ]High flow    [ ]Acute  [ ]Chronic [ ]Hypoxic  [ ]Hypercarbic [ ]Other  [ ]Other organ failure     LABS:                        11.6   4.79  )-----------( 134      ( 06 Jun 2024 01:00 )             34.8   06-06    131<L>  |  96  |  117<H>  ----------------------------<  93  3.9   |  15<L>  |  3.22<H>    Ca    8.2<L>      06 Jun 2024 08:32  Phos  6.5     06-06  Mg     2.9     06-06    TPro  6.6  /  Alb  2.8<L>  /  TBili  3.4<H>  /  DBili  x   /  AST  177<H>  /  ALT  140<H>  /  AlkPhos  415<H>  06-06      Urinalysis Basic - ( 06 Jun 2024 08:32 )    Color: x / Appearance: x / SG: x / pH: x  Gluc: 93 mg/dL / Ketone: x  / Bili: x / Urobili: x   Blood: x / Protein: x / Nitrite: x   Leuk Esterase: x / RBC: x / WBC x   Sq Epi: x / Non Sq Epi: x / Bacteria: x      RADIOLOGY & ADDITIONAL STUDIES:    PROTEIN CALORIE MALNUTRITION PRESENT: [ ]mild [ ]moderate [ ]severe [ ]underweight [ ]morbid obesity  https://www.andeal.org/vault/2440/web/files/ONC/Table_Clinical%20Characteristics%20to%20Document%20Malnutrition-White%20JV%20et%20al%202012.pdf    Height (cm): 182.9 (06-06-24 @ 00:33), 182.9 (05-30-24 @ 07:31), 182.9 (05-25-24 @ 11:08)  Weight (kg): 85.7 (06-06-24 @ 00:33), 84.4 (05-30-24 @ 07:31), 82.6 (05-25-24 @ 11:08)  BMI (kg/m2): 25.6 (06-06-24 @ 00:33), 25.2 (05-30-24 @ 07:31), 24.7 (05-25-24 @ 11:08)    [ ]PPSV2 < or = to 30% [ ]significant weight loss  [ ]poor nutritional intake  [ ]anasarca[ ]Artificial Nutrition      Other REFERRALS:  [ ]Hospice  [ ]Child Life  [ ]Social Work  [ ]Case management [ ]Holistic Therapy

## 2024-06-06 NOTE — ED PROVIDER NOTE - CLINICAL SUMMARY MEDICAL DECISION MAKING FREE TEXT BOX
80 y/o male with h/o CAD, CHF< CKD, BIB ems c/o he is lethargic and his bP is low  , no fever, no SOb, on exam pt was unresponsive with pin point pupils, was given IV narcan to which he responded immediately, pt was found to have worsening renal function in few days and also had elevated trop, pt was admitted

## 2024-06-06 NOTE — H&P ADULT - VTE RISK ASSESSMENT
Expected Date Of Service: 08/01/2023 Billing Type: [employer] Performing Laboratory: -[id number] Bill For Surgical Tray: no Lab Facility: [id number] <<--- Click to launch

## 2024-06-06 NOTE — CARDIOLOGY SUMMARY
[___] : [unfilled] [LVEF ___%] : LVEF [unfilled]% [de-identified] : 5/11/2022 normal sinus rhythm anteroseptal pattern anterior ST segment elevation  5/27/22 anterior mi AI lafb rsr pac rsr lafb qrsd 110 5/26/2024 sinus rhythm left axis deviation incomplete right bundle branch block  [de-identified] : 5/20/22 EF .3  LVT 1x1.3 cm decreased RV function RV Enlargement MARCOS  8/25/2022 cardiomyopathy with resolution of a left ventricular thrombus 4/7/2024 ejection fraction 25 to 30% severely decreased global function [de-identified] : minimal viability in infarcted territories [de-identified] : 5/11/22 lad .7 diag .7 RCA 1.00 circ normal

## 2024-06-06 NOTE — GOALS OF CARE CONVERSATION - ADVANCED CARE PLANNING - CONVERSATION/DISCUSSION
Diagnosis/Prognosis/MOLST Discussed/Hospice Referral Diagnosis/Prognosis/MOLST Discussed/Treatment Options/Hospice Referral

## 2024-06-06 NOTE — H&P ADULT - NSHPPHYSICALEXAM_GEN_ALL_CORE
Vital Signs Last 24 Hrs  T(C): 36.3 (06 Jun 2024 01:28), Max: 36.3 (06 Jun 2024 01:28)  T(F): 97.3 (06 Jun 2024 01:28), Max: 97.3 (06 Jun 2024 01:28)  HR: 71 (06 Jun 2024 03:00) (59 - 71)  BP: 108/72 (06 Jun 2024 03:00) (86/58 - 108/72)  BP(mean): --  RR: 17 (06 Jun 2024 03:00) (16 - 18)  SpO2: 98% (06 Jun 2024 03:00) (98% - 98%)    Parameters below as of 06 Jun 2024 03:00  Patient On (Oxygen Delivery Method): room air    Constitutional: Pt lying in bed, sleepy but arousable to voice, NAD  HEENT: +b/l scleral icterus, EOMI, normal hearing, moist mucous membranes  Neck: Soft and supple, no JVD  Respiratory: CTABL, mild decrease breath sounds at Rt base, No wheezing, rales or rhonchi  Cardiovascular: S1S2+, RRR, no M/G/R  Gastrointestinal: BS+, soft, nontender, distended, +fluid wave, no guarding, no rebound, neg Velasco's sign  Extremities: +B/L pitting edema  Vascular: 2+ peripheral pulses  Neurological: AAOx3, no focal deficits  Musculoskeletal: 5/5 strength b/l upper and lower extremities  Skin: +bruises on chin and throat, +Lt eyelid bruise, +extensive bruise on Lt forearm, No rashes Vital Signs Last 24 Hrs  T(C): 36.3 (06 Jun 2024 01:28), Max: 36.3 (06 Jun 2024 01:28)  T(F): 97.3 (06 Jun 2024 01:28), Max: 97.3 (06 Jun 2024 01:28)  HR: 71 (06 Jun 2024 03:00) (59 - 71)  BP: 108/72 (06 Jun 2024 03:00) (86/58 - 108/72)  BP(mean): --  RR: 17 (06 Jun 2024 03:00) (16 - 18)  SpO2: 98% (06 Jun 2024 03:00) (98% - 98%)    Parameters below as of 06 Jun 2024 03:00  Patient On (Oxygen Delivery Method): room air    Constitutional: Pt lying in bed, sleepy but arousable to voice, NAD  HEENT: +b/l scleral icterus, EOMI, normal hearing, moist mucous membranes  Neck: Soft and supple, no JVD  Respiratory: CTABL, mild decrease breath sounds at Rt base, No wheezing, rales or rhonchi  Cardiovascular: S1S2+, RRR, no M/G/R  Gastrointestinal: BS+, soft, nontender, distended, +fluid wave, no guarding, no rebound, neg Velasco's sign  Extremities: +B/L pitting edema  Vascular: 2+ peripheral pulses  Neurological: AAOx3, no focal deficits  Musculoskeletal: moving all extremities  Skin: +bruises on chin and throat, +Lt eyelid bruise, +extensive bruise on Lt forearm, No rashes

## 2024-06-06 NOTE — PROGRESS NOTE ADULT - ASSESSMENT
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 Pt was BIBEMS to  ED due to lethargy and hypotension tonight after taking temazepam, Prozac, flomax and Coreg. Pt is admitted for:    #Lethargy  #Syncope  #hypotension  #elevated Trop  #History of Combined Systolic and Diastolic HF with EF 25-30% (Echo 4/2024)  #History of CAD/Valvular Disease   -Trop 105.4 > 97.3  -last TTE 4/2024: EF 25-30%, grade1 diastolic dysfunction, Mild-moderate MR, Moderate TR  -CXR neg  -CT head neg  -hold ARNI, mineralocorticoid, SGLT2 due to renal function  -hold bumex, coreg due to soft BP  -s/p midodrine 10mg tid  -c/w midodrine 10mg tid  -c/w aspirin, plavix  -c/w statin  - Daily weight. Strict in's and out's, telemetry  -remote Tele monitor  -cardio consult   -trend troponin  -f/u Echo  - follow up cardio consult    #Worsening jaundice 2/2 hepatic congestion  #Elevated T bili/Alk Phos/mild transaminitis  -He has no abdominal pain but  scleral icterus and jaundiced skin possibly due to hepatic congestion 2/2 HF  -Prior imaging showed RUQ US with gallstone; CTAP: Cholelithiasis with nonspecific gallbladder wall thickening.   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  -Baseline Cr 1.5  -Cr  3.29 on admission  -Avoid nephrotoxic agents  -hold off on GDMT such as ARNI, mineralocorticoid, SGLT2 due to renal function  -Monitor BMP  - follow up nephro consult    #Anxiety/Depression/Bipolar Disorder  -Continue prozac  -hold mirtazapine due to hypotension    #BPH  -hold flomax qhs   -bladder scan q6h  -monitor urine output    #History of Lung Cancer  #HARRY Lung Nodule  -s/p RUL lobectomy (9/2022) with recurrent disease s/p radiation to left lung (completed 3/2023)  -Recent PET showed The LEFT lung nodule does appear increasingly rounded solid, and although the degree of uptake there is essentially unchanged. The RIGHT lung appears to have slowly become denser over time, with the appearance of uptake there are also appearing slightly more prominent.   -Follows with Dr. Alejandre for surveillance    #DVT PPx  -hold a/c for possible procedure    #FULL CODE      Case was discussed with Dr. 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 Pt was BIBEMS to  ED due to lethargy and hypotension tonight after taking temazepam, Prozac, flomax and Coreg. Pt is admitted for:    #Lethargy  #Syncope  #hypotension  #elevated Trop  #History of Combined Systolic and Diastolic HF with EF 25-30% (Echo 4/2024)  #History of CAD/Valvular Disease   -Trop 105.4 > 97.3  -TTE 4/2024: EF 25-30%, grade1 diastolic dysfunction, Mild-moderate MR, Moderate TR  -CXR neg  -CT head neg  -hold ARNI, mineralocorticoid, SGLT2 due to renal function  -hold bumex, coreg due to soft BP  -s/p midodrine 10mg tid  -c/w midodrine 10mg tid  -c/w aspirin, plavix  -c/w statin  - Daily weight. Strict in's and out's, telemetry  -remote Tele monitor  - TTE today-  Biatrial enlargement, LV <20%, Moderate mitral valve regurgitation, Moderate-severe tricuspid regurgitation, Mild aortic regurgitation.  -cardio recommendations appreciated      #Worsening jaundice 2/2 hepatic congestion  #Elevated T bili/Alk Phos/mild transaminitis  -He has no abdominal pain but  scleral icterus and jaundiced skin possibly due to hepatic congestion 2/2 HF  -Prior imaging showed RUQ US with gallstone; CTAP: Cholelithiasis with nonspecific gallbladder wall thickening.   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  -Baseline Cr 1.5  -Cr  3.29 on admission  -Avoid nephrotoxic agents  -hold off on GDMT such as ARNI, mineralocorticoid, SGLT2 due to renal function  -Monitor BMP  - follow up nephro consult    #Anxiety/Depression/Bipolar Disorder  -Continue prozac  -hold mirtazapine due to hypotension    #BPH  -hold flomax qhs   -bladder scan q6h  -monitor urine output    #History of Lung Cancer  #HARRY Lung Nodule  -s/p RUL lobectomy (9/2022) with recurrent disease s/p radiation to left lung (completed 3/2023)  -Recent PET showed The LEFT lung nodule does appear increasingly rounded solid, and although the degree of uptake there is essentially unchanged. The RIGHT lung appears to have slowly become denser over time, with the appearance of uptake there are also appearing slightly more prominent.   -Follows with Dr. Alejandre for surveillance    #DVT PPx  -hold a/c for possible procedure    #FULL CODE      Case was discussed with Dr. 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 Pt was BIBEMS to  ED due to lethargy and hypotension tonight after taking temazepam, Prozac, flomax and Coreg. Pt is admitted for:    #Lethargy  #Syncope  #hypotension  #elevated Trop  #History of Combined Systolic and Diastolic HF with EF 25-30% (Echo 4/2024)  #History of CAD/Valvular Disease   -Trop 105.4 > 97.3  -TTE 4/2024: EF 25-30%, grade1 diastolic dysfunction, Mild-moderate MR, Moderate TR  -CXR neg  -CT head neg  -hold ARNI, mineralocorticoid, SGLT2 due to renal function  -hold bumex, coreg due to soft BP  -s/p midodrine 10mg tid  -c/w midodrine 10mg tid  -c/w aspirin, plavix  -c/w statin  - Daily weight. Strict in's and out's, telemetry  -remote Tele monitor  - TTE today-  Biatrial enlargement, LV <20%, Moderate mitral valve regurgitation, Moderate-severe tricuspid regurgitation, Mild aortic regurgitation.  - Not on GDMT- SGLT2-i for HFpEF due to CKD with GFR 19   -cardio recommendations appreciated      #Worsening jaundice 2/2 hepatic congestion  #Elevated T bili/Alk Phos/mild transaminitis  -He has no abdominal pain but  scleral icterus and jaundiced skin possibly due to hepatic congestion 2/2 HF  -Prior imaging showed RUQ US with gallstone; CTAP: Cholelithiasis with nonspecific gallbladder wall thickening.   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  -Baseline Cr 1.5  -Cr  3.29 on admission  -Avoid nephrotoxic agents  -hold off on GDMT such as ARNI, mineralocorticoid, SGLT2 due to renal function  -Monitor BMP  - follow up nephro consult    #Anxiety/Depression/Bipolar Disorder  -Continue prozac  -hold mirtazapine due to hypotension    #BPH  -hold flomax qhs   -bladder scan q6h  -monitor urine output    #History of Lung Cancer  #HARRY Lung Nodule  -s/p RUL lobectomy (9/2022) with recurrent disease s/p radiation to left lung (completed 3/2023)  -Recent PET showed The LEFT lung nodule does appear increasingly rounded solid, and although the degree of uptake there is essentially unchanged. The RIGHT lung appears to have slowly become denser over time, with the appearance of uptake there are also appearing slightly more prominent.   -Follows with Dr. Alejandre for surveillance  - consulted oncology     #DVT PPx  -hold a/c for possible procedure    #GOC  - FULL CODE  - palliative following case, ongoing discussion of GOC      Case was discussed with Dr. Saldana

## 2024-06-06 NOTE — ED PROVIDER NOTE - OBJECTIVE STATEMENT
78 y/o male with h/o CAD, CHF< CKD, BIB ems c/o he is lethargic and his bP is low  , no fever, no SOb

## 2024-06-06 NOTE — CONSULT NOTE ADULT - ASSESSMENT
79 year old male with history of combined systolic/diastolic HF with EF 25-30%(Apr/2024), CAD, CKD, BPH, lung CA s/p RUL lobectomy(Sep/2022), Rtx(Mar/2023), who was admitted on 6/6/24 for [facility]. Palliative medicine team was consulted to assist with GOC.    1. Patient denies any symptom burdens. Can use opioids for dyspnea if needed.     2. Advanced  Directives  - surrogate: Kenia(wife)  - code status: full code     3. Palliative medicine encounter   - introduced palliative medicine team to the patient and his family- see GOC note. We will continue to follow for ongoing GOC.  will need cardiology and oncology input to further discuss patient's overall prognosis.

## 2024-06-06 NOTE — GOALS OF CARE CONVERSATION - ADVANCED CARE PLANNING - CONVERSATION DETAILS
Goals of Care (GOC)/Advance Care Planning (ACP)  Date of Discussion/evaluation: 6/6/24    Purpose of Discussion: In the setting of advanced age and multiple comorbidities, discussion of patient's wished should there be any deterioration in health status.   Parties Present/Discussed with: patient and wife Kenia Becerra  Patient's Decision making capacity at the time of discussion: 17:30- AAOx3 has decision making capacity.  Presentation: As above  GOC: Code Status, HCP, Alternative Feeding Methods    PLAN:  Code Status: DNR/DNI with trial intubation  HCP: wife Kenia Becerra  Both are making decisions together without difficulty  Alternative Feeding methods:  no feeding tube      MOLST filled out and completed at bedside @ 1730 on 6/6/2024; Patient and wife aware that changes can be made by family at any time and moving forward. They verbalized understanding of above and agreeable to proceed as planned.     ACP/GOC Time Spent: 23 minutes        Case seen and discussed with Dr. Sheehan Goals of Care (GOC)/Advance Care Planning (ACP)  Date of Discussion/evaluation: 6/6/24    Purpose of Discussion: In the setting of advanced age and multiple comorbidities, discussion of patient's wished should there be any deterioration in health status.   Parties Present/Discussed with: patient and wife Kenia Becerra  Patient's Decision making capacity at the time of discussion: 17:30- AAOx3 has decision making capacity.  Presentation: As above  GOC: Code Status, HCP, Alternative Feeding Methods    PLAN:  Code Status: DNR/DNI with trial intubation  HCP: wife Kenia Becerra  Both are making decisions together without difficulty  Alternative Feeding methods:  no feeding tube      MOLST filled out and completed at bedside @ 1730 on 6/6/2024; Patient and wife aware that changes can be made by family at any time and moving forward. They verbalized understanding of above and agreeable to proceed as planned.     **Pt and wife requested hospice consult. Pt does not want any extraordinary measures to be done. Interested in being comfortable at home, but wife mentions they have several steps at home and would need ambulette among other supplies.        Case seen and discussed with Dr. Sheehan Goals of Care (GOC)/Advance Care Planning (ACP)  Date of Discussion/evaluation: 6/6/24    Purpose of Discussion: In the setting of advanced age and multiple comorbidities, discussion of patient's wished should there be any deterioration in health status.   Parties Present/Discussed with: patient and wife Kenia Becerra  Patient's Decision making capacity at the time of discussion: 17:30- AAOx3 has decision making capacity.  Presentation: As above  GOC: Code Status, HCP, Alternative Feeding Methods    PLAN:  Code Status: DNR/DNI, no trial of NIV   HCP: wife Kenia Becerra  Both are making decisions together without difficulty  Alternative Feeding methods:  no feeding tube    MOLST filled out and completed at bedside @ 1730 on 6/6/2024; Patient and wife aware that changes can be made by family at any time and moving forward. They verbalized understanding of above and agreeable to proceed as planned.     **We discussed hospice with the patient and the patient and his family may be willing to consider this, but they also wanted to see if cardiology had any further recommendations. Pt does not want any extraordinary measures to be done.**    Case seen and discussed with Dr. Sheehan Goals of Care (GOC)/Advance Care Planning (ACP)  Date of Discussion/evaluation: 6/6/24    Purpose of Discussion: In the setting of advanced age and multiple comorbidities, discussion of patient's wished should there be any deterioration in health status.   Parties Present/Discussed with: patient and wife Kenia Becerra  Patient's Decision making capacity at the time of discussion: 17:30- AAOx3 has decision making capacity.  Presentation: As above  GOC: Code Status, HCP, Alternative Feeding Methods    PLAN:  Code Status: DNR/DNI, no trial of NIV   HCP: wife Kenia Becerra  Both are making decisions together without difficulty  Alternative Feeding methods:  no feeding tube    MOLST filled out and completed at bedside @ 1730 on 6/6/2024; Patient and wife aware that changes can be made by family at any time and moving forward. They verbalized understanding of above and agreeable to proceed as planned.     **We discussed hospice with the patient and the patient and his family may be willing to consider this, but they also wanted to see if cardiology had any further recommendations. Pt does not want any extraordinary measures to be done. His wife states that they are interested in learning about hospice as the goal is to keep the patient comfortable. She would need an ambulette for the patient to go home.**    Case seen and discussed with Dr. Sheehan CXR negative - No CHF, No cardiomegaly, No pleural effusions, mildly rotated film

## 2024-06-06 NOTE — ED ADULT NURSE NOTE - CADM POA CENTRAL LINE
[FreeTextEntry1] : CAD- coronary calcification noted on CT. Discussed with pt who wants a conservative approach. LDL 53, A1c 6.0. No testing planned. He was on ASA but he elected to stop it D/T bruising. Still bruising on Prednisone 10 mg   HLD- continue current meds, LDL 53 on Rosuvastatin 10, he has a h/o a severe myopathy on Prednisone and Crestor 20 mg.  Increased to 10 alt with 20 mg.  He defers ASA.  He reduced Crestor to 10 d/t weakness.     CKD- he defers Jardiance, to discuss Kerendia  Prediabetes- manage with diet. A1c 6.0, 5.8, pt likes cake.    Weakness- Pt requests certain labs.  Labs were drawn today in Office.  No

## 2024-06-06 NOTE — H&P ADULT - ATTENDING COMMENTS
I have personally seen and examined patient on the above date.  I discussed the case with Dr Alcazar and I agree with findings and plan as detailed per note above, which I have amended where appropriate.    Superseding the above.    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 Since D/C pt has been fatigued and fell off his chair and hit his chin on the bedframe and had syncopized and wife was unable to wake him up until several minutes. Police had arrived and helped him up but did not go to hospital. Subsequently noted more progressive decline with poor po intake. Unable to tolerate Bumex with low BP in the 70s and it was held as per cardiologist for the past 2 days.  s/p coreg tonight and usual temazepam/remeron and flomax and pt became more lethargic and hypotensive and EMS dispatched. In ED, afebrile P: 59 BP: 86/58 sat 98% on RA. Pupils noted to be pinpoint and was given Narcan and reported good response and pt became more alet. Was also given 10mg of midodrine and BP now 96/66. Pt lethargic but communicative and noted to be more subdued than usual. Denied any fevers, chills, cough, SOB, CP, diarrhea dysuria.   PE:   Noted bruising at R eyelid and hematomas on chin x 2  CV:S1S2, RRR, no mgr  CL; CTA anteriorly. no rales.   abd; soft NT ND +BS  Ext: bl LE edema to prox knees  WBC: 4.79 hgb: 11.6 64% N  CO2: 19 BUN/cr: 116/3.29 ( 82/2.14 ) trop: 105.4 TB: 3.4 AP: 444 AST/ALT: 176/134 lact: 2.7 M.8 P: 6.3 BNP: 5254  EKG: personally rev. sinus osvaldo with 1 AVB at 59bpm RBBB Q v1-v4  CXR; personally rev. decreased R pleural effusion.     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   likely related to overdiuresis vs worsening EF with now positive trop   trend trops, serial EKGs. repeat ECHO, cardiology consult.   hold BP meds coreg for now. hold flomax.   monitor BP closely - may need inotropic support.   was recc to have cardiomems and proph AICD.   #Elev LFTs with known cholelithiasis and chronic GB thickening.   asx   however given elev lactate and hypotension, check hida scan to exclude occult cholecystitis to be complete  #BRODY on CKD  hold flomax, diuretics.  avoid hypotension  weights, I/Os  check PVRs.   nephrology consult.   #DVT/GI proph.     Fatou ETIENNE rev.   D/W Dr Aponte I have personally seen and examined patient on the above date.  I discussed the case with Dr Alcazar and I agree with findings and plan as detailed per note above, which I have amended where appropriate.    Superseding the above.    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 Since D/C pt has been fatigued and fell off his chair and hit his chin on the bedframe and had syncopized and wife was unable to wake him up until several minutes. Police had arrived and helped him up but did not go to hospital. Subsequently noted more progressive decline with poor po intake. Unable to tolerate Bumex with low BP in the 70s and it was held as per cardiologist for the past 2 days.  s/p coreg tonight and usual temazepam/remeron and flomax and pt became more lethargic and hypotensive and EMS dispatched. In ED, afebrile P: 59 BP: 86/58 sat 98% on RA. Pupils noted to be pinpoint and was given Narcan and reported good response and pt became more alet. Was also given 10mg of midodrine and BP now 96/66. Pt lethargic but communicative and noted to be more subdued than usual. Denied any fevers, chills, cough, SOB, CP, diarrhea dysuria.   PE:   Noted bruising at R eyelid and hematomas on chin x 2  CV:S1S2, RRR, no mgr  CL; CTA anteriorly. no rales.   abd; soft NT ND +BS  Ext: bl LE edema to prox knees  WBC: 4.79 hgb: 11.6 64% N  CO2: 19 BUN/cr: 116/3.29 ( 82/2.14 ) trop: 105.4 TB: 3.4 AP: 444 AST/ALT: 176/134 lact: 2.7 M.8 P: 6.3 BNP: 5254  EKG: personally rev. sinus osvaldo with 1 AVB at 59bpm RBBB Q v1-v4  CXR; personally rev. decreased R pleural effusion.     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   likely related to overdiuresis vs worsening EF with now positive trop   trend trops, serial EKGs. repeat ECHO, cardiology consult.   hold BP meds coreg for now. hold flomax.   monitor BP closely - may need inotropic support.   consider continuing midodrine pending cardio reccs  was recc to have cardiomems and proph AICD.   #Elev LFTs with known cholelithiasis and chronic GB thickening.   asx   however given elev lactate and hypotension, check hida scan to exclude occult cholecystitis to be complete  #BRODY on CKD  hold flomax, diuretics.  avoid hypotension  weights, I/Os  check PVRs.   nephrology consult.   #DVT/GI proph.     Fatou ETIENNE rev.   D/W Dr Aponte

## 2024-06-06 NOTE — HISTORY OF PRESENT ILLNESS
[FreeTextEntry1] : This is a 76 y/o M with pmhx of Depression, bipolar, BPH, COVID Winter 2021(recovered), hx of STEMI and cardiogenic shock in may 2022, presented to the [facility] ED for new onset hemoptysis.  Known R lung nodule, suspicious for CA, pending IR biopsy. Patient with recent hospitalization where he had a STEMI 5/2022, found to have 100% occlusion RCA and 70% occlusion LAD, needed CCU for IABP support and then weaned off. Viability showing poor salvageability and so he was medically managed. Also during this time, TTE with EF 30%, LV thrombus for which he was started on Coumadin, DAPT. He was to continue Coumadin x 3 months, last dose up until 8/26 for which he was compliant.He was hospitalized at [facility] with hemoptysis . He underwent echo 8/25/22 that showed a cardiomyopathy and resolution of the left ventricular thrombus. I coordinated care with the Hospitalist service and warfarin was discontinued. He was discharged on aspirin alone.  He comes today for clarification of  his overall cardiovascular risk prior to a planned biopsy and surgical excision of a lung mass. He was advised to undergo a complete cardiac evaluation. He denies chest pains shortness of breath or loss of consciousnes.  1/24/2023 John is seen today after prolonged hospitalization we reinstituted heart failure meds in the setting of possible metastatic carcinoma.  Concern was raised regarding edema and possible thrombosis  4/18/23 4/13/223 John was recently seen in rehab he felt fatigue his blood pressure was 90/54 and reported lightheadedness his blood pressure dropped to 78/50  6/19/23 azalia Aquino bp 88/60. will make metoprolol once per day. hold bumetanide (prn dosing as noted) and entresto. Kenia/John will call with bp readings in am.  7/7/23  John returns from [place] he had occasional short lived bout of shortness of breath 2 to 3 days ago now resolved weight 178 on PRN dosing of diuretic bumetidine  10/10/202 John was in [place]. He suffered anasarca with scrotal edema and hospitalized for at least 6 to 7 days and was placed in ICU and diuresed.He returned to [place] and was seen by the heart failure team Dr. Shell. His medications were appropriately adjusted.   6/5/2024 I received an urgent call from his wife yesterday regarding hypotension and fatigue and poor urine output Bumex was held due to a probable overdiuresis and intravascular volume depletions. He did better after "bullion" and did not require ER eval.  his fluids were also had been restricted patient recently seen in hospital May 28 after a fall it turns out he bit his tongue and there was a fair amount of bleeding the police were summoned and he was brought to the hospital hemoglobin was 11.1 GFR 31 which is low CT of the abdomen cholelithiasis small ascites nuclear perfusion lung scan low probability ultrasound negative for DVT ultrasound abdomen cholelithiasis small ascites transthoracic echo ejection fraction 25 to 30%. discharged on aspirin 81 Bumex half tablet carvedilol 3.125 every 12 clopidogrel 75 rosuvastatin 20 mg

## 2024-06-06 NOTE — ED ADULT NURSE NOTE - NSFALLHARMRISKINTERV_ED_ALL_ED
Assistance OOB with selected safe patient handling equipment if applicable/Communicate risk of Fall with Harm to all staff, patient, and family/Provide visual cue: red socks, yellow wristband, yellow gown, etc/Reinforce activity limits and safety measures with patient and family/Bed in lowest position, wheels locked, appropriate side rails in place/Call bell, personal items and telephone in reach/Instruct patient to call for assistance before getting out of bed/chair/stretcher/Non-slip footwear applied when patient is off stretcher/[place] to call system/Physically safe environment - no spills, clutter or unnecessary equipment/Purposeful Proactive Rounding/Room/bathroom lighting operational, light cord in reach

## 2024-06-06 NOTE — ED PROVIDER NOTE - NSICDXPASTMEDICALHX_GEN_ALL_CORE_FT
PAST MEDICAL HISTORY:  Anxiety     Bipolar disorder     BPH (benign prostatic hyperplasia)     CAD (coronary artery disease)     Constipation     Depression     Depression     History of CHF (congestive heart failure)     History of inguinal hernia     Lung mass     LV (left ventricular) mural thrombus     Other nonspecific abnormal finding of lung field     SCC (squamous cell carcinoma)     Umbilical hernia, incarcerated     Urinary retention     
Decr. functional mobility 2/2 decr. dynamic balance, guarded gait 2/2 pain

## 2024-06-06 NOTE — PROGRESS NOTE ADULT - ATTENDING COMMENTS
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 Pt was BIBEMS to  ED due to lethargy and hypotension tonight after taking temazepam, Prozac, flomax and Coreg. Pt is admitted for syncope and complicated care coordination PLan: apprec multidiscplinary team collaboration in prognostic evaluation for aggressive vs hospice-comfort care, pt has severe malnutrition and complex care acute on chronic

## 2024-06-06 NOTE — ED PROVIDER NOTE - CLINICAL SUMMARY MEDICAL DECISION MAKING FREE TEXT BOX
Stable
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% (Echo 4/2024), valvular disease (mild-moderate MR, moderate TR), CKD, anxiety/depression/bipolar disorder, history of lung cancer s/p RUL lobectomy (9/2022) with recurrent disease s/p radiation to left lung (completed 3/2023),  admitted 4/6-4/10 for dizziness felt to be due to overdiuresis, discharged yesterday from the hospital came to ed cc sob dyspnea at rest and low bp better with Small amount of IV fluids Patient is requiring 2 L nasal cannula in the emergency room with saturation of 99%Patient's current medications are Bumex 2 mg aspirin Plavix Prozac Crestor Flomax mirtazapine and metoprolol which was recently started in the last admission according to the wife may be Metroprolol dropped the blood pressure Patient's cardiologist is Dr. Suleman Thompson telephone #[phone]  Chest x-ray mild peripheral vascular congestion patient given 0.5 mg of Bumex IV with significant relief

## 2024-06-06 NOTE — ED PROVIDER NOTE - PHYSICAL EXAMINATION
General:     NAD, well-nourished, well-appearing  Head:     NC/AT, EOMI, oral mucosa moist  Neck:     supple  Lungs:     CTA b/l, no w/r/r  CVS:     S1S2, RRR, no m/g/r  Abd:     +BS, s/nt/nd, no organomegaly  Ext:    2+ radial and pedal pulses, no c/c/e  Neuro: lethargic , pupils pin point

## 2024-06-06 NOTE — H&P ADULT - NSHPREVIEWOFSYSTEMS_GEN_ALL_CORE
CONSTITUTIONAL: no fevers or chills  EYES/ENT: No visual changes;  No vertigo or throat pain   RESPIRATORY: No cough, wheezing, hemoptysis; No shortness of breath  CARDIOVASCULAR: No chest pain or palpitations  GASTROINTESTINAL: No abdominal or epigastric pain. No nausea, vomiting  GENITOURINARY: No dysuria, frequency or hematuria  NEUROLOGICAL: No numbness or weakness  SKIN: No itching, rashes CONSTITUTIONAL: +lethargic, no fevers or chills  EYES/ENT: No visual changes;  No vertigo or throat pain   RESPIRATORY: No cough, wheezing, or hemoptysis; No shortness of breath  CARDIOVASCULAR: No chest pain or palpitations  GASTROINTESTINAL: +decreased appetite, No abdominal or epigastric pain. No nausea, or vomiting  GENITOURINARY: No dysuria, frequency or hematuria  NEUROLOGICAL: No numbness or weakness  SKIN: No itching, or rashes

## 2024-06-06 NOTE — PATIENT PROFILE ADULT - DO YOU FEEL THREATENED BY OTHERS?
Subjective:       Patient ID: Ashley Mckinley is a 20 y.o. female.    Chief Complaint: Nasal Congestion (Patient is 19 weeks pregnant), Headache, and Cough    Cough  Associated symptoms include headaches, nasal congestion, postnasal drip and rhinorrhea. Pertinent negatives include no chest pain, chills, ear congestion, ear pain, fever, heartburn, hemoptysis, myalgias, rash, sore throat, shortness of breath, sweats, weight loss or wheezing. The symptoms are aggravated by dust, pollens and stress. Risk factors for lung disease include occupational exposure and smoking/tobacco exposure. The treatment provided mild relief. There is no history of asthma, bronchiectasis, bronchitis, COPD, environmental allergies or pneumonia.     Review of Systems   Constitutional:  Negative for activity change, appetite change, chills, diaphoresis, fatigue, fever, unexpected weight change and weight loss.   HENT:  Positive for nasal congestion, postnasal drip, rhinorrhea and sinus pressure/congestion. Negative for dental problem, drooling, ear discharge, ear pain, facial swelling, hearing loss, mouth sores, nosebleeds, sneezing, sore throat, tinnitus, trouble swallowing, voice change and goiter.    Eyes:  Negative for photophobia, discharge, itching and visual disturbance.   Respiratory:  Positive for cough. Negative for apnea, hemoptysis, choking, chest tightness, shortness of breath, wheezing and stridor.    Cardiovascular:  Negative for chest pain, palpitations, leg swelling and claudication.   Gastrointestinal:  Negative for abdominal distention, abdominal pain, anal bleeding, blood in stool, change in bowel habit, constipation, diarrhea, heartburn, nausea and vomiting.   Endocrine: Negative for cold intolerance, heat intolerance, polydipsia, polyphagia and polyuria.   Genitourinary:  Negative for bladder incontinence, decreased urine volume, difficulty urinating, dysuria, enuresis, flank pain, frequency, hematuria, nocturia,  pelvic pain and urgency.   Musculoskeletal:  Negative for arthralgias, back pain, gait problem, joint swelling, leg pain, myalgias, neck pain, neck stiffness and joint deformity.   Integumentary:  Negative for pallor, rash, wound, breast mass and breast tenderness.   Allergic/Immunologic: Negative for environmental allergies, food allergies and immunocompromised state.   Neurological:  Positive for headaches. Negative for dizziness, vertigo, tremors, seizures, syncope, facial asymmetry, speech difficulty, weakness, light-headedness, numbness, memory loss and coordination difficulties.   Hematological:  Negative for adenopathy. Does not bruise/bleed easily.   Psychiatric/Behavioral:  Negative for agitation, behavioral problems, confusion, decreased concentration, dysphoric mood, hallucinations, self-injury, sleep disturbance and suicidal ideas. The patient is not nervous/anxious and is not hyperactive.    Breast: Negative for mass and tenderness        Objective:      Physical Exam  Vitals reviewed.   Constitutional:       Appearance: Normal appearance.   HENT:      Head: Normocephalic and atraumatic.      Right Ear: Tympanic membrane, ear canal and external ear normal.      Left Ear: Tympanic membrane, ear canal and external ear normal.      Nose: Congestion and rhinorrhea present.      Mouth/Throat:      Mouth: Mucous membranes are moist.      Pharynx: Oropharynx is clear. Posterior oropharyngeal erythema present.   Eyes:      Extraocular Movements: Extraocular movements intact.      Conjunctiva/sclera: Conjunctivae normal.      Pupils: Pupils are equal, round, and reactive to light.   Cardiovascular:      Rate and Rhythm: Normal rate and regular rhythm.      Pulses: Normal pulses.      Heart sounds: Normal heart sounds.   Pulmonary:      Effort: Pulmonary effort is normal.      Breath sounds: Normal breath sounds.   Abdominal:      General: Bowel sounds are normal.      Palpations: Abdomen is soft.    Musculoskeletal:         General: Normal range of motion.      Cervical back: Normal range of motion and neck supple.   Skin:     General: Skin is warm and dry.   Neurological:      General: No focal deficit present.      Mental Status: She is alert. Mental status is at baseline.   Psychiatric:         Mood and Affect: Mood normal.         Behavior: Behavior normal.         Thought Content: Thought content normal.         Judgment: Judgment normal.         Assessment:       1. Sinusitis, unspecified chronicity, unspecified location    2. Encounter for screening laboratory testing for COVID-19 virus        Plan:     Sinusitis, unspecified chronicity, unspecified location    Encounter for screening laboratory testing for COVID-19 virus  -     POCT SARS-COV2 (COVID) with Flu Antigen    Other orders  -     cephALEXin (KEFLEX) 500 MG capsule; Take 1 capsule (500 mg total) by mouth every 12 (twelve) hours. for 5 days  Dispense: 10 capsule; Refill: 0            no

## 2024-06-06 NOTE — ED ADULT TRIAGE NOTE - CHIEF COMPLAINT QUOTE
pt  from home by ambulance with hypotension  pt  lethargic on arrival wife  states  pt  has had covid chf and spot on lung possible  ca. 2022  upper right lung  removed 5 radiation tx  for left  lung  ca

## 2024-06-07 DIAGNOSIS — I50.9 HEART FAILURE, UNSPECIFIED: ICD-10-CM

## 2024-06-07 DIAGNOSIS — N18.9 CHRONIC KIDNEY DISEASE, UNSPECIFIED: ICD-10-CM

## 2024-06-07 DIAGNOSIS — F41.9 ANXIETY DISORDER, UNSPECIFIED: ICD-10-CM

## 2024-06-07 DIAGNOSIS — C34.90 MALIGNANT NEOPLASM OF UNSPECIFIED PART OF UNSPECIFIED BRONCHUS OR LUNG: ICD-10-CM

## 2024-06-07 DIAGNOSIS — I25.10 ATHEROSCLEROTIC HEART DISEASE OF NATIVE CORONARY ARTERY WITHOUT ANGINA PECTORIS: ICD-10-CM

## 2024-06-07 DIAGNOSIS — Z29.9 ENCOUNTER FOR PROPHYLACTIC MEASURES, UNSPECIFIED: ICD-10-CM

## 2024-06-07 DIAGNOSIS — N40.0 BENIGN PROSTATIC HYPERPLASIA WITHOUT LOWER URINARY TRACT SYMPTOMS: ICD-10-CM

## 2024-06-07 LAB
ALBUMIN SERPL ELPH-MCNC: 2.8 G/DL — LOW (ref 3.3–5)
ALP SERPL-CCNC: 442 U/L — HIGH (ref 40–120)
ALT FLD-CCNC: 192 U/L — HIGH (ref 10–45)
ANION GAP SERPL CALC-SCNC: 14 MMOL/L — SIGNIFICANT CHANGE UP (ref 5–17)
AST SERPL-CCNC: 287 U/L — HIGH (ref 10–40)
BILIRUB SERPL-MCNC: 4.1 MG/DL — HIGH (ref 0.2–1.2)
BUN SERPL-MCNC: 120 MG/DL — HIGH (ref 7–23)
CALCIUM SERPL-MCNC: 8.3 MG/DL — LOW (ref 8.4–10.5)
CHLORIDE SERPL-SCNC: 91 MMOL/L — LOW (ref 96–108)
CO2 SERPL-SCNC: 20 MMOL/L — LOW (ref 22–31)
CREAT SERPL-MCNC: 3.38 MG/DL — HIGH (ref 0.5–1.3)
EGFR: 18 ML/MIN/1.73M2 — LOW
GLUCOSE SERPL-MCNC: 91 MG/DL — SIGNIFICANT CHANGE UP (ref 70–99)
HCT VFR BLD CALC: 35.6 % — LOW (ref 39–50)
HGB BLD-MCNC: 11.7 G/DL — LOW (ref 13–17)
MAGNESIUM SERPL-MCNC: 2.9 MG/DL — HIGH (ref 1.6–2.6)
MCHC RBC-ENTMCNC: 30.1 PG — SIGNIFICANT CHANGE UP (ref 27–34)
MCHC RBC-ENTMCNC: 32.9 GM/DL — SIGNIFICANT CHANGE UP (ref 32–36)
MCV RBC AUTO: 91.5 FL — SIGNIFICANT CHANGE UP (ref 80–100)
NRBC # BLD: 0 /100 WBCS — SIGNIFICANT CHANGE UP (ref 0–0)
PLATELET # BLD AUTO: 127 K/UL — LOW (ref 150–400)
POTASSIUM SERPL-MCNC: 4.6 MMOL/L — SIGNIFICANT CHANGE UP (ref 3.5–5.3)
POTASSIUM SERPL-SCNC: 4.6 MMOL/L — SIGNIFICANT CHANGE UP (ref 3.5–5.3)
PROT SERPL-MCNC: 6.6 G/DL — SIGNIFICANT CHANGE UP (ref 6–8.3)
RBC # BLD: 3.89 M/UL — LOW (ref 4.2–5.8)
RBC # FLD: 16.6 % — HIGH (ref 10.3–14.5)
SODIUM SERPL-SCNC: 125 MMOL/L — LOW (ref 135–145)
WBC # BLD: 6.12 K/UL — SIGNIFICANT CHANGE UP (ref 3.8–10.5)
WBC # FLD AUTO: 6.12 K/UL — SIGNIFICANT CHANGE UP (ref 3.8–10.5)

## 2024-06-07 PROCEDURE — 99232 SBSQ HOSP IP/OBS MODERATE 35: CPT

## 2024-06-07 PROCEDURE — 99497 ADVNCD CARE PLAN 30 MIN: CPT

## 2024-06-07 PROCEDURE — 99233 SBSQ HOSP IP/OBS HIGH 50: CPT | Mod: GC

## 2024-06-07 PROCEDURE — 99233 SBSQ HOSP IP/OBS HIGH 50: CPT

## 2024-06-07 PROCEDURE — 99223 1ST HOSP IP/OBS HIGH 75: CPT

## 2024-06-07 RX ORDER — PANTOPRAZOLE SODIUM 20 MG/1
40 TABLET, DELAYED RELEASE ORAL
Refills: 0 | Status: DISCONTINUED | OUTPATIENT
Start: 2024-06-07 | End: 2024-06-14

## 2024-06-07 RX ORDER — DOBUTAMINE HCL 250MG/20ML
2.5 VIAL (ML) INTRAVENOUS
Qty: 1000 | Refills: 0 | Status: DISCONTINUED | OUTPATIENT
Start: 2024-06-07 | End: 2024-06-07

## 2024-06-07 RX ORDER — TAMSULOSIN HYDROCHLORIDE 0.4 MG/1
0.8 CAPSULE ORAL AT BEDTIME
Refills: 0 | Status: DISCONTINUED | OUTPATIENT
Start: 2024-06-07 | End: 2024-06-14

## 2024-06-07 RX ORDER — DOBUTAMINE HCL 250MG/20ML
2.5 VIAL (ML) INTRAVENOUS
Qty: 1000 | Refills: 0 | Status: DISCONTINUED | OUTPATIENT
Start: 2024-06-07 | End: 2024-06-10

## 2024-06-07 RX ORDER — ROSUVASTATIN CALCIUM 5 MG/1
20 TABLET ORAL AT BEDTIME
Refills: 0 | Status: DISCONTINUED | OUTPATIENT
Start: 2024-06-07 | End: 2024-06-14

## 2024-06-07 RX ORDER — HEPARIN SODIUM 5000 [USP'U]/ML
5000 INJECTION INTRAVENOUS; SUBCUTANEOUS EVERY 12 HOURS
Refills: 0 | Status: DISCONTINUED | OUTPATIENT
Start: 2024-06-07 | End: 2024-06-11

## 2024-06-07 RX ORDER — CHLORHEXIDINE GLUCONATE 213 G/1000ML
1 SOLUTION TOPICAL
Refills: 0 | Status: DISCONTINUED | OUTPATIENT
Start: 2024-06-07 | End: 2024-06-14

## 2024-06-07 RX ADMIN — Medication 650 MILLIGRAM(S): at 06:46

## 2024-06-07 RX ADMIN — Medication 650 MILLIGRAM(S): at 14:12

## 2024-06-07 RX ADMIN — TAMSULOSIN HYDROCHLORIDE 0.8 MILLIGRAM(S): 0.4 CAPSULE ORAL at 22:15

## 2024-06-07 RX ADMIN — Medication 3.21 MICROGRAM(S)/KG/MIN: at 13:05

## 2024-06-07 RX ADMIN — Medication 3 MILLIGRAM(S): at 22:13

## 2024-06-07 RX ADMIN — HEPARIN SODIUM 5000 UNIT(S): 5000 INJECTION INTRAVENOUS; SUBCUTANEOUS at 17:52

## 2024-06-07 RX ADMIN — CLOPIDOGREL BISULFATE 75 MILLIGRAM(S): 75 TABLET, FILM COATED ORAL at 11:06

## 2024-06-07 RX ADMIN — Medication 81 MILLIGRAM(S): at 11:07

## 2024-06-07 RX ADMIN — ROSUVASTATIN CALCIUM 20 MILLIGRAM(S): 5 TABLET ORAL at 22:14

## 2024-06-07 RX ADMIN — Medication 650 MILLIGRAM(S): at 22:13

## 2024-06-07 NOTE — CONSULT NOTE ADULT - PROBLEM SELECTOR RECOMMENDATION 9
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  Additionally new right pleural effusion noted.  Treated with palliative RT, no systemic chemotherapy.  Recent staging scans raising the possibility of a metastatic pulmonary neoplasm.  Patient needs histologic diagnosis; will follow-up with oncology in ambulatory.  Discharge planning in progress. History of stage Ib (pT2a N0) right lung adenocarcinoma, s/p RUL lobectomy September 2022, with most recent restaging 6 PET/CT on 5/5/2024 showing increasingly rounded solid left upper lobe pulmonary nodule and an ill-defined hazy/ groundglass findings in the lateral right lower lobe suggestive of metastatic malignancy.  Additionally new right pleural effusion noted.  Though patient has frequent CHF decompensations, the issue is that of recurrent disease.  Of note, patient has never received systemic chemotherapy, but rather only palliative RT.  Currently in need of dobutamine infusion per cardiology; patient is not a candidate for advanced therapies as his heart failure is end-stage.  Dobutamine drip for comfort, diuresis as needed.  Discussed with patient's oncologist, Dr. Schwartz, who plans no systemic treatment.  Patient's wife informed.  Continue comfort/supportive care.

## 2024-06-07 NOTE — CONSULT NOTE ADULT - PROBLEM SELECTOR RECOMMENDATION 3
Per heme/onc: likely recurrence of  Stage Ib (pT2a N0) right lung adenocarcinoma, s/p RUL lobectomy September 2022, with most recent restaging 6 PET/CT on 5/5/2024 showing increasingly rounded solid left upper lobe pulmonary nodule suggestive of malignancy and an ill-defined hazy/ground-glass findings in the lateral right lower lobe also suggestive of malignancy.  Recent staging scans raising the possibility of a metastatic pulmonary neoplasm.  Patient needs histologic diagnosis; will follow-up with oncology in ambulatory

## 2024-06-07 NOTE — CONSULT NOTE ADULT - SUBJECTIVE AND OBJECTIVE BOX
Source:  Patient, chart, and providers  Reliability:  Good    CC:  "I can't catch my breath"    HPI  79yMale    PMH    PSH    Allergies    No Known Allergies    Intolerances        Current Medications  aspirin enteric coated 81 milliGRAM(s) Oral daily  atorvastatin 80 milliGRAM(s) Oral at bedtime  carvedilol 3.125 milliGRAM(s) Oral daily  chlorhexidine 2% Cloths 1 Application(s) Topical <User Schedule>  clopidogrel Tablet 75 milliGRAM(s) Oral daily  DOBUTamine Infusion 2.5 MICROgram(s)/kG/Min (3.21 mL/Hr) IV Continuous <Continuous>  sodium bicarbonate 650 milliGRAM(s) Oral three times a day  acetaminophen     Tablet .. 650 milliGRAM(s) Oral every 6 hours PRN Temp greater or equal to 38C (100.4F), Mild Pain (1 - 3)  aluminum hydroxide/magnesium hydroxide/simethicone Suspension 30 milliLiter(s) Oral every 4 hours PRN Dyspepsia  melatonin 3 milliGRAM(s) Oral at bedtime PRN Insomnia  ondansetron Injectable 4 milliGRAM(s) IV Push every 8 hours PRN Nausea and/or Vomiting    Home Medications  aspirin 81 mg oral delayed release tablet: 1 tab(s) orally once a day (06 Jun 2024 02:59)  clopidogrel 75 mg oral tablet: 1 tab(s) orally once a day (06 Jun 2024 02:59)  FLUoxetine (Eqv-PROzac) 20 mg oral tablet: 1 tab(s) orally every 3 days (06 Jun 2024 02:59)  latanoprost 0.005% ophthalmic solution: 1 drop(s) in each eye once a day (06 Jun 2024 02:59)  mirtazapine 15 mg oral tablet: 1 tab(s) orally once a day (at bedtime) (06 Jun 2024 02:59)  temazepam 15 mg oral capsule: 1 cap(s) orally once a day (at bedtime) as needed for  insomnia (06 Jun 2024 02:59)    Psoc    Pfam  Family history of depression (Mother)    FH: CAD (coronary artery disease) (Sibling, Sibling)    Family history of diabetes mellitus (DM) (Sibling)    Review of Systems  	CONSTITUTIONAL: Denies fever, weight loss, or fatigue  	ENT: Denies dysphagia, difficulty chewing, tinnitus, blurred vision, double vision  	RESPIRATORY: See HPI  	CARDIOVASCULAR: See HPI  	GASTROINTESTINAL:  Denies nausea, vomiting, abdominal pain, diarrhea, constipation, melena, BRBPR, food intolerances  	GENITOURINARY: Denies dysuria, hematuria, urinary frequency, urinary incontinence  	NEUROLOGICAL: Denies headaches, syncope, near syncope, dizziness, lightheadedness, headaches, seizures  	SKIN: Denies rashes, masses, bruising, lesions   	MUSCULOSKELETAL: Denies history of OA or gout, joint swelling  	PSYCHIATRIC: Denies depression, anxiety, mood swings, or difficulty sleeping  	HEME: Denies history of DVT/PE, blood transfusion    Labs                        11.7   6.12  )-----------( 127      ( 07 Jun 2024 06:01 )             35.6     125<L>  |  91<L>  |  120<H>  ----------------------------<  91  4.6   |  20<L>  |  3.38<H>    Ca    8.3<L>      07 Jun 2024 06:01  Phos  6.5     06-06  Mg     2.9     06-07  TPro  6.6  /  Alb  2.8<L>  /  TBili  4.1<H>  /  DBili  x   /  AST  287<H>  /  ALT  192<H>  /  AlkPhos  442<H>  06-07    Lactic Acid Trend  06-06-24 @ 05:00   -   1.9  06-06-24 @ 01:00   -   2.7<H>          Radiology    Vital Signs Last 24 Hrs  T(C): 36.8 (07 Jun 2024 06:09), Max: 36.8 (07 Jun 2024 06:09)  T(F): 98.2 (07 Jun 2024 06:09), Max: 98.2 (07 Jun 2024 06:09)  HR: 64 (07 Jun 2024 06:09) (60 - 67)  BP: 95/59 (07 Jun 2024 06:09) (88/65 - 110/58)  RR: 16 (07 Jun 2024 06:09) (16 - 18)  SpO2: 99% (07 Jun 2024 06:09) (97% - 100%)    Physical Exam  Gen:  WN/WD Male resting in bed, NAD  ENT:  NC/AT, no JVD noted  Thorax:  Symmetric, no retractions  Lung:  CTA b/l  CV:  S1, S2. RRR  Abd:  Soft, NT/ND.  BS normoactive, no masses to palp  Extrem:  No C/C/E, DP/radial pulses +2  Neuro:  No gross motor/sensory deficits  Psych:  Awake, alert and calm Source:  Patient, chart, and providers  Reliability:  Good    CC:  "I can't catch my breath"    HPI  79yMale    PMH  CAD, Proximal left anterior descending 70 % stenosis, first diagonal 70 % stenosis, Mid right coronary  100 % stenosis.    Systolic heart failure, EF < 20%  Right sided heart failure  BPH (benign prostatic hyperplasia)  Bipolar disorder  Depression/Anxiety  Chronic Constipation  Lung cancer s/p RUL lobectomy (2022) with recurrent disease s/p radiation to left lung (completed 3/2023)Urinary retention    PS  History of arthroscopy of knee Right,   History of tonsillectomy   History of hernia repair  H/O coronary angiogram    Allergies  No known allergies or intolerances  No known food or environmental allergies    Current Medications  aspirin enteric coated 81 milliGRAM(s) Oral daily  atorvastatin 80 milliGRAM(s) Oral at bedtime  carvedilol 3.125 milliGRAM(s) Oral daily  chlorhexidine 2% Cloths 1 Application(s) Topical <User Schedule>  clopidogrel Tablet 75 milliGRAM(s) Oral daily  DOBUTamine Infusion 2.5 MICROgram(s)/kG/Min (3.21 mL/Hr) IV Continuous <Continuous>  sodium bicarbonate 650 milliGRAM(s) Oral three times a day  acetaminophen     Tablet .. 650 milliGRAM(s) Oral every 6 hours PRN Temp greater or equal to 38C (100.4F), Mild Pain (1 - 3)  aluminum hydroxide/magnesium hydroxide/simethicone Suspension 30 milliLiter(s) Oral every 4 hours PRN Dyspepsia  melatonin 3 milliGRAM(s) Oral at bedtime PRN Insomnia  ondansetron Injectable 4 milliGRAM(s) IV Push every 8 hours PRN Nausea and/or Vomiting    Home Medications  aspirin 81 mg oral delayed release tablet: 1 tab(s) orally once a day (2024 02:59)  clopidogrel 75 mg oral tablet: 1 tab(s) orally once a day (2024 02:59)  FLUoxetine (Eqv-PROzac) 20 mg oral tablet: 1 tab(s) orally every 3 days (2024 02:59)  latanoprost 0.005% ophthalmic solution: 1 drop(s) in each eye once a day (2024 02:59)  mirtazapine 15 mg oral tablet: 1 tab(s) orally once a day (at bedtime) (2024 02:59)  temazepam 15 mg oral capsule: 1 cap(s) orally once a day (at bedtime) as needed for  insomnia (2024 02:59)    Psoc  No history of tobacco or ETOH use  No history of illicit drug use, substance abuse    Pfam  Family history of depression (Mother)  FH: CAD (coronary artery disease) (Sibling, Sibling)  Family history of diabetes mellitus (DM) (Sibling)    Review of Systems  	CONSTITUTIONAL: Denies fever, weight loss, or fatigue  	ENT: Denies dysphagia, difficulty chewing, tinnitus, blurred vision, double vision  	RESPIRATORY: See HPI  	CARDIOVASCULAR: See HPI  	GASTROINTESTINAL:  Denies nausea, vomiting, abdominal pain, diarrhea, constipation, melena, BRBPR, food intolerances  	GENITOURINARY: Denies dysuria, hematuria, urinary frequency, urinary incontinence  	NEUROLOGICAL: Denies headaches, syncope, near syncope, dizziness, lightheadedness, headaches, seizures  	SKIN: Denies rashes, masses, bruising, lesions   	MUSCULOSKELETAL: Denies history of OA or gout, joint swelling  	PSYCHIATRIC: Denies depression, anxiety, mood swings, or difficulty sleeping  	HEME: Denies history of DVT/PE, blood transfusion    Labs                        11.7   6.12  )-----------( 127      ( 2024 06:01 )             35.6     125<L>  |  91<L>  |  120<H>  ----------------------------<  91  4.6   |  20<L>  |  3.38<H>    Ca    8.3<L>      2024 06:01  Phos  6.5       Mg     2.9       TPro  6.6  /  Alb  2.8<L>  /  TBili  4.1<H>  /  DBili  x   /  AST  287<H>  /  ALT  192<H>  /  AlkPhos  442<H>      Lactic Acid Trend  24 @ 05:00   -   1.9  24 @ 01:00   -   2.7<H>    Cardiology  ECG  Ventricular Rate 59 BPM    Atrial Rate 59 BPM    P-R Interval 264 ms    QRS Duration 136 ms    Q-T Interval 528 ms    QTC Calculation(Bazett) 522 ms    P Axis 92 degrees    R Axis -70 degrees    T Axis 93 degrees    Diagnosis Line Sinus bradycardiawith 1st degree AV block  Left axis deviation  Right bundle branch block  Anteroseptal infarct (cited on or before 11-MAY-2022)    When compared with ECG of 26-MAY-2024 14:10,  premature ventricular complexes are no longer present  Confirmed by RIK PHIPPS MD () on 2024 9:09:54 AM    Transthoracic Echocardiogram    ACC: [id number] EXAM:  ECHO TTE WO CON COMP W DOPP                        PROCEDURE DATE:  2024    INTERPRETATION:  TRANSTHORACIC ECHOCARDIOGRAM REPORT  Patient Name:   ELYSE MALAVE Patient Location: [address] [place] [address] Rec #:  [id number]            Accession #:      [id number]  Account #:      [id number]             Height:           72.0 in 183.0 cm  YOB: 1945           Weight:           187.4 lb 85.01 kg  Patient Age:    79 years            BSA:              2.07 m²  Patient Gender: M                   BP:               102/64 mmHg  Date of Exam:        2024 8:29:18 AM  Sonographer:         Gerald Lopez  Referring Physician: ALFONZO ARIZA    Procedure:     2D Echo/Doppler/Color Doppler Complete.  Indications:   I50.9 - Heart failure, unspecified  Diagnosis:     I50.9 - Heart failure, unspecified  Study Details: Technically fair study.        2D AND M-MODE MEASUREMENTS (normal ranges within parentheses):  Left                 Normal   Aorta/Left       Normal  Ventricle:                    Atrium:  IVSd (2D):    1.42  (0.7-1.1) Aortic Root    3.90  (2.4-3.7)                 cm             (2D):           cm  LVPWd (2D):   1.33  (0.7-1.1) Left Atrium    4.70  (1.9-4.0)                 cm       (2D):           cm  LVIDd (2D):   5.66  (3.4-5.7) LA Volume      38.5                 cm             Index         ml/m²  LVIDs (2D):   4.44            Right Ventricle:                 cm             TAPSE:           0.92 cm  LV FS (2D):   21.6   (>25%)                  %  Relative Wall 0.47   (<0.42)  Thickness    LV SYSTOLIC FUNCTION BY 2D PLANIMETRY (MOD):  EF-A4C View: 21.2 % EF-A2C View: 22.1 % EF-Biplane: 23 %    LV DIASTOLIC FUNCTION:  MV Peak E: 0.59 m/s Decel Time: 186 msec  MV Peak A: 0.45 m/s  E/A Ratio: 1.30    SPECTRAL DOPPLER ANALYSIS (where applicable):  Mitral Valve:  MV P1/2 Time: 53.94 msec  MV Area, PHT: 4.08 cm²    Mitral Insufficiency by PISA:  MR Volume: 45.43 ml MR Flow Rate: 119.43 ml/s MR EROA: 28.57 mm²    Aortic Valve: AoV Max Dave: 0.72 m/s AoV Peak P.1 mmHg AoV Mean P.2 mmHg    LVOT Vmax: 0.48 m/s LVOT VTI: 0.110 m LVOT Diameter: 2.50 cm    AoV Area, Vmax: 3.26 cm² AoV Area, VTI: 3.53 cm² AoV Area, Vmn: 3.05 cm²  Ao VTI: 0.153  Aortic Insufficiency:  AI Half-time:  658 msec  AI Decel Rate: 1.51 m/s²    Tricuspid Valve and PA/RV Systolic Pressure: TR Max Velocity: 2.72 m/s RA   Pressure: 15 mmHg RVSP/PASP: 44.6 mmHg      PHYSICIAN INTERPRETATION:  Left Ventricle: The left ventricular internal cavity size is mildly   increased. Left ventricular wall thickness is increased. There is mild   concentric left ventricular hypertrophy involving the global wall. The   LVH involves global walls.  Global LV systolic function was severely decreased.Left ventricular   ejection fraction, by visual estimation, is <20%. The interventricular   septum is flattened in systole and diastole, consistent with right   ventricular pressure and volume overload. Spectral Doppler shows normal   pattern of LV diastolic filling.      LV Wall Scoring:  The RCA distribution, entire septum, and entire apex are akinetic. The mid  anterior segment is hypokinetic. All remaining scored segments are normal.    Right Ventricle: The right ventricular size is severely enlarged. RV   systolic function is severely reduced. TV S' 0.1 m/s.  Left Atrium: Mildly enlarged left atrium. LA volume Index is 38.5 ml/m²   ml/m2.  Right Atrium: Severely enlarged right atrium.  Pericardium: Trivial pericardial effusion is present. The pericardial   effusion is localized near the right ventricle.  Mitral Valve: Structurally normal mitral valve, with normal leaflet   excursion. Moderate mitral valve regurgitation is seen. The MR jet is   centrally-directed. Diastolic mitral regurgitation is present.  Tricuspid Valve: Structurally normal tricuspid valve, with normal leaflet   excursion. Moderate-severe tricuspid regurgitation is visualized.   Estimated pulmonary artery systolic pressure is 44.6 mmHg assuming a   right atrialpressure of 15 mmHg, which is consistent with mild pulmonary   hypertension.  Aortic Valve: Normal trileaflet aortic valve with normal opening. Mild   aortic valve regurgitation is seen.  Pulmonic Valve: Structurally normal pulmonic valve, with normal leaflet   excursion. Mild pulmonic valve regurgitation.  Aorta: The aortic root and ascending aorta are structurally normal, with   no evidence of dilitation. The aortic arch was not well visualized.  Pulmonary Artery: The main pulmonary artery is normal in size.  Venous: The inferior vena cava is abnormal. The inferior vena cava was   dilated, with respiratory size variation less tan 50%.      Summary:   1. Biatrial enlargement   2. Left ventricular ejection fraction, by visual estimation, is <20%.   3. Severely decreased global left ventricular systolic function.   4. Entire septum, entire apex, and mid anterior segment are abnormal as   described above.   5. Increased LV wall thickness.   6. Mildly increased left ventricular internal cavity size.   7. Right ventricular volume and pressure overload.   8. There is mild concentric left ventricular hypertrophy.   9. Severely enlarged right ventricle.  10. Severely reduced RV systolic function.  11. Moderate mitral valve regurgitation.  12. Moderate-severe tricuspid regurgitation.  13. Mild aortic regurgitation.  14. Mild pulmonic valve regurgitation.  15. Estimated pulmonary artery systolic pressure is 44.6 mmHg assuming a   right atrial pressure of 15 mmHg, which is consistent with mild pulmonary   hypertension.  16. LA volume Index is 38.5 ml/m² ml/m2.    [id number] Rik Phipps , Electronically signed on 2024   at 1:34:30 PM    Vital Signs Last 24 Hrs  T(C): 36.8 (2024 06:09), Max: 36.8 (2024 06:09)  T(F): 98.2 (2024 06:09), Max: 98.2 (2024 06:09)  HR: 64 (2024 06:09) (60 - 67)  BP: 95/59 (2024 06:09) (88/65 - 110/58)  RR: 16 (2024 06:09) (16 - 18)  SpO2: 99% (2024 06:09) (97% - 100%)    Physical Exam  Gen:  WN/WD Male resting in bed, NAD  ENT:  NC/AT, no JVD noted  Thorax:  Symmetric, no retractions  Lung:  CTA b/l  CV:  S1, S2. RRR  Abd:  Soft, NT/ND.  BS normoactive, no masses to palp  Extrem:  No C/C/E, DP/radial pulses +2  Neuro:  No gross motor/sensory deficits  Psych:  Awake, alert and calm Source:  Patient, chart, and providers  Reliability:  Good    CC:  "I can't catch my breath"    HPI  79 year old DNR/DNI male with PMH CAD, systolic HF with EF 25-30%, right heart failure, valvular disease, CKD, BPH, anxiety/depression/bipolar disorder, with history of lung cancer s/p RUL lobectomy (2022) with recurrent disease s/p radiation to left lung (completed 3/2023) who presented to ED at [facility] yesterday with lethargy and hypotension.  Of note patient had multiple admissions recently for acute decompensated heart failure, last discharged on .  Had mechanical fall on  with localized trauma to chin and arm, wife reported that since fall patient had been more lethargic with poor PO intake.  Denied any further falls, fever, chills, chest pain, abdominal pain, vomiting, diarrhea or dysuria.     ED evaluation revealed worsening renal function and transaminitis.  Troponin elevated and peaked at 105.4 with no significant T-wave changes on EKG, BMP elevated at >5000.  Patient again admitted to medicine service for acute decompensated heart failure. ICU consulted today for further management of advanced heart failure with Dobutamine.  States he is very short of breath with minimal activity.  Denies chest pain, nausea, abdominal pain, vomiting.     PMH  CAD, Proximal left anterior descending 70 % stenosis, first diagonal 70 % stenosis, Mid right coronary  100 % stenosis.    Systolic heart failure, EF < 20%  Right sided heart failure  BPH (benign prostatic hyperplasia)  Bipolar disorder  Depression/Anxiety  Chronic Constipation  Lung cancer s/p RUL RUL lobectomy (2022) with recurrent disease s/p radiation to left lung (completed 3/2023)    [facility]  RUL lobectomy (2022) with recurrent disease  History of arthroscopy of knee Right,   History of tonsillectomy   History of hernia repair  H/O coronary angiogram    Allergies  No known allergies or intolerances  No known food or environmental allergies    Current Medications  aspirin enteric coated 81 milliGRAM(s) Oral daily  atorvastatin 80 milliGRAM(s) Oral at bedtime  carvedilol 3.125 milliGRAM(s) Oral daily  chlorhexidine 2% Cloths 1 Application(s) Topical <User Schedule>  clopidogrel Tablet 75 milliGRAM(s) Oral daily  DOBUTamine Infusion 2.5 MICROgram(s)/kG/Min (3.21 mL/Hr) IV Continuous <Continuous>  sodium bicarbonate 650 milliGRAM(s) Oral three times a day  acetaminophen     Tablet .. 650 milliGRAM(s) Oral every 6 hours PRN Temp greater or equal to 38C (100.4F), Mild Pain (1 - 3)  aluminum hydroxide/magnesium hydroxide/simethicone Suspension 30 milliLiter(s) Oral every 4 hours PRN Dyspepsia  melatonin 3 milliGRAM(s) Oral at bedtime PRN Insomnia  ondansetron Injectable 4 milliGRAM(s) IV Push every 8 hours PRN Nausea and/or Vomiting    Home Medications  aspirin 81 mg oral delayed release tablet: 1 tab(s) orally once a day (2024 02:59)  clopidogrel 75 mg oral tablet: 1 tab(s) orally once a day (2024 02:59)  FLUoxetine (Eqv-PROzac) 20 mg oral tablet: 1 tab(s) orally every 3 days (2024 02:59)  latanoprost 0.005% ophthalmic solution: 1 drop(s) in each eye once a day (2024 02:59)  mirtazapine 15 mg oral tablet: 1 tab(s) orally once a day (at bedtime) (2024 02:59)  temazepam 15 mg oral capsule: 1 cap(s) orally once a day (at bedtime) as needed for  insomnia (2024 02:59)    Psoc  No history of tobacco or ETOH use  No history of illicit drug use, substance abuse    Pfam  Family history of depression (Mother)  FH: CAD (coronary artery disease) (Sibling, Sibling)  Family history of diabetes mellitus (DM) (Sibling)    Review of Systems  	CONSTITUTIONAL: Denies fever, weight loss, or fatigue  	ENT: Denies dysphagia, difficulty chewing, tinnitus, blurred vision, double vision  	RESPIRATORY: See HPI  	CARDIOVASCULAR: See HPI  	GASTROINTESTINAL:  Denies nausea, vomiting, abdominal pain, diarrhea, constipation, melena, BRBPR, food intolerances  	GENITOURINARY: Denies dysuria, hematuria, urinary frequency, urinary incontinence  	NEUROLOGICAL: Denies headaches, syncope, near syncope, dizziness, lightheadedness, headaches, seizures  	SKIN: Denies rashes, masses, bruising, lesions   	MUSCULOSKELETAL: Denies history of OA or gout, joint swelling  	PSYCHIATRIC: Denies depression, anxiety, mood swings, or difficulty sleeping  	HEME: Denies history of DVT/PE, blood transfusion    Labs                        11.7   6.12  )-----------( 127      ( 2024 06:01 )             35.6     125<L>  |  91<L>  |  120<H>  ----------------------------<  91  4.6   |  20<L>  |  3.38<H>    Ca    8.3<L>      2024 06:01  Phos  6.5       Mg     2.9       TPro  6.6  /  Alb  2.8<L>  /  TBili  4.1<H>  /  DBili  x   /  AST  287<H>  /  ALT  192<H>  /  AlkPhos  442<H>      Lactic Acid Trend  24 @ 05:00   -   1.9  24 @ 01:00   -   2.7<H>    Trend Cardiac Enzymes  24 @ 08:32  [id number]-TRWIB-CPKMM/Trop I - -- - --  - --  -  --  /  95.4<H>  24 @ 03:00  [id number]-[id number]-CFULH-CPKMM/Trop I - -- - --  - --  -  --  /  97.3<H>  24 @ 01:00  [id number]-[id number]-DISMX-CPKMM/Trop I - -- - --  - --  -  --  /  105.4<H>    Cardiology  ECG  Ventricular Rate 59 BPM    Atrial Rate 59 BPM    P-R Interval 264 ms    QRS Duration 136 ms    Q-T Interval 528 ms    QTC Calculation(Bazett) 522 ms    P Axis 92 degrees    R Axis -70 degrees    T Axis 93 degrees    Diagnosis Line Sinus bradycardiawith 1st degree AV block  Left axis deviation  Right bundle branch block  Anteroseptal infarct (cited on or before 11-MAY-2022)    When compared with ECG of 26-MAY-2024 14:10,  premature ventricular complexes are no longer present  Confirmed by RIK PHIPPS MD () on 2024 9:09:54 AM    Transthoracic Echocardiogram    ACC: [id number] EXAM:  ECHO TTE WO CON COMP W DOPP                        PROCEDURE DATE:  2024    INTERPRETATION:  TRANSTHORACIC ECHOCARDIOGRAM REPORT  Patient Name:   ELYSE MALAVE Patient Location: [address]  [facility] Rec #:  [id number]            Accession #:      [id number]  Account #:      [id number]             Height:           72.0 in 183.0 cm  YOB: 1945           Weight:           187.4 lb 85.01 kg  Patient Age:    79 years            BSA:              2.07 m²  Patient Gender: M                   BP:               102/64 mmHg  Date of Exam:        2024 8:29:18 AM  Sonographer:         Gerald Lopez  Referring Physician: ALFONZO ARIZA    Procedure:     2D Echo/Doppler/Color Doppler Complete.  Indications:   I50.9 - Heart failure, unspecified  Diagnosis:     I50.9 - Heart failure, unspecified  Study Details: Technically fair study.        2D AND M-MODE MEASUREMENTS (normal ranges within parentheses):  Left                 Normal   Aorta/Left       Normal  Ventricle:                    Atrium:  IVSd (2D):    1.42  (0.7-1.1) Aortic Root    3.90  (2.4-3.7)                 cm             (2D):           cm  LVPWd (2D):   1.33  (0.7-1.1) Left Atrium    4.70  (1.9-4.0)                 cm       (2D):           cm  LVIDd (2D):   5.66  (3.4-5.7) LA Volume      38.5                 cm             Index         ml/m²  LVIDs (2D):   4.44            Right Ventricle:                 cm             TAPSE:           0.92 cm  LV FS (2D):   21.6   (>25%)                  %  Relative Wall 0.47   (<0.42)  Thickness    LV SYSTOLIC FUNCTION BY 2D PLANIMETRY (MOD):  EF-A4C View: 21.2 % EF-A2C View: 22.1 % EF-Biplane: 23 %    LV DIASTOLIC FUNCTION:  MV Peak E: 0.59 m/s Decel Time: 186 msec  MV Peak A: 0.45 m/s  E/A Ratio: 1.30    SPECTRAL DOPPLER ANALYSIS (where applicable):  Mitral Valve:  MV P1/2 Time: 53.94 msec  MV Area, PHT: 4.08 cm²    Mitral Insufficiency by PISA:  MR Volume: 45.43 ml MR Flow Rate: 119.43 ml/s MR EROA: 28.57 mm²    Aortic Valve: AoV Max Dave: 0.72 m/s AoV Peak P.1 mmHg AoV Mean P.2 mmHg    LVOT Vmax: 0.48 m/s LVOT VTI: 0.110 m LVOT Diameter: 2.50 cm    AoV Area, Vmax: 3.26 cm² AoV Area, VTI: 3.53 cm² AoV Area, Vmn: 3.05 cm²  Ao VTI: 0.153  Aortic Insufficiency:  AI Half-time:  658 msec  AI Decel Rate: 1.51 m/s²    Tricuspid Valve and PA/RV Systolic Pressure: TR Max Velocity: 2.72 m/s RA   Pressure: 15 mmHg RVSP/PASP: 44.6 mmHg      PHYSICIAN INTERPRETATION:  Left Ventricle: The left ventricular internal cavity size is mildly   increased. Left ventricular wall thickness is increased. There is mild   concentric left ventricular hypertrophy involving the global wall. The   LVH involves global walls.  Global LV systolic function was severely decreased.Left ventricular   ejection fraction, by visual estimation, is <20%. The interventricular   septum is flattened in systole and diastole, consistent with right   ventricular pressure and volume overload. Spectral Doppler shows normal   pattern of LV diastolic filling.      LV Wall Scoring:  The RCA distribution, entire septum, and entire apex are akinetic. The mid  anterior segment is hypokinetic. All remaining scored segments are normal.    Right Ventricle: The right ventricular size is severely enlarged. RV   systolic function is severely reduced. TV S' 0.1 m/s.  Left Atrium: Mildly enlarged left atrium. LA volume Index is 38.5 ml/m²   ml/m2.  Right Atrium: Severely enlarged right atrium.  Pericardium: Trivial pericardial effusion is present. The pericardial   effusion is localized near the right ventricle.  Mitral Valve: Structurally normal mitral valve, with normal leaflet   excursion. Moderate mitral valve regurgitation is seen. The MR jet is   centrally-directed. Diastolic mitral regurgitation is present.  Tricuspid Valve: Structurally normal tricuspid valve, with normal leaflet   excursion. Moderate-severe tricuspid regurgitation is visualized.   Estimated pulmonary artery systolic pressure is 44.6 mmHg assuming a   right atrialpressure of 15 mmHg, which is consistent with mild pulmonary   hypertension.  Aortic Valve: Normal trileaflet aortic valve with normal opening. Mild   aortic valve regurgitation is seen.  Pulmonic Valve: Structurally normal pulmonic valve, with normal leaflet   excursion. Mild pulmonic valve regurgitation.  Aorta: The aortic root and ascending aorta are structurally normal, with   no evidence of dilitation. The aortic arch was not well visualized.  Pulmonary Artery: The main pulmonary artery is normal in size.  Venous: The inferior vena cava is abnormal. The inferior vena cava was   dilated, with respiratory size variation less tan 50%.      Summary:   1. Biatrial enlargement   2. Left ventricular ejection fraction, by visual estimation, is <20%.   3. Severely decreased global left ventricular systolic function.   4. Entire septum, entire apex, and mid anterior segment are abnormal as   described above.   5. Increased LV wall thickness.   6. Mildly increased left ventricular internal cavity size.   7. Right ventricular volume and pressure overload.   8. There is mild concentric left ventricular hypertrophy.   9. Severely enlarged right ventricle.  10. Severely reduced RV systolic function.  11. Moderate mitral valve regurgitation.  12. Moderate-severe tricuspid regurgitation.  13. Mild aortic regurgitation.  14. Mild pulmonic valve regurgitation.  15. Estimated pulmonary artery systolic pressure is 44.6 mmHg assuming a   right atrial pressure of 15 mmHg, which is consistent with mild pulmonary   hypertension.  16. LA volume Index is 38.5 ml/m² ml/m2.    [id number] Rik Phipps , Electronically signed on 2024   at 1:34:30 PM    Vital Signs Last 24 Hrs  T(C): 36.8 (2024 06:09), Max: 36.8 (2024 06:09)  T(F): 98.2 (2024 06:09), Max: 98.2 (2024 06:09)  HR: 64 (2024 06:09) (60 - 67)  BP: 95/59 (2024 06:09) (88/65 - 110/58)  RR: 16 (2024 06:09) (16 - 18)  SpO2: 99% 2L NC (2024 06:09) (97% - 100%)    Physical Exam  Gen:  WN/WD Male sitting in chair, appears mildly distressed  ENT:  NC/AT, no JVD noted  Thorax:  Symmetric, no retractions  Lung:  Scattered rales throughout  CV:  Heart sounds distant, S1, S2. RRR  Abd:  Soft, NT/ND.  BS normoactive, no masses to palp  Extrem:  Warm, pink.  Trace edema in LE's bilaterally to ankle  Neuro:  No gross motor/sensory deficits  Psych:  Awake, alert and calm Source:  Patient, chart, and providers  Reliability:  Good    CC:  "I can't catch my breathe"    HPI  79 year old DNR/DNI male with PMH CAD, systolic HF with EF 25-30%, right heart failure, valvular disease, CKD, BPH, anxiety/depression/bipolar disorder, with history of lung cancer s/p RUL lobectomy (2022) with recurrent disease s/p radiation to left lung (completed 3/2023) who presented to ED at [facility] yesterday with lethargy and hypotension.  Of note patient had multiple admissions recently for acute decompensated heart failure, last discharged on .  Had mechanical fall on  with localized trauma to chin and arm, wife reported that since fall patient had been more lethargic with poor PO intake.  Denied any further falls, fever, chills, chest pain, abdominal pain, vomiting, diarrhea or dysuria.     ED evaluation revealed worsening renal function and transaminitis.  Troponin elevated and peaked at 105.4 with no significant T-wave changes on EKG, BMP elevated at >5000.  Patient again admitted to medicine service for acute decompensated heart failure. ICU consulted today for further management of advanced heart failure with Dobutamine.  States he is very short of breath with minimal activity.  Denies chest pain, nausea, abdominal pain, vomiting.     PMH  CAD, Proximal left anterior descending 70 % stenosis, first diagonal 70 % stenosis, Mid right coronary  100 % stenosis.    Systolic heart failure, EF < 20%  Right sided heart failure  BPH (benign prostatic hyperplasia)  Bipolar disorder  Depression/Anxiety  Chronic Constipation  Lung cancer s/p RUL RUL lobectomy (2022) with recurrent disease s/p radiation to left lung (completed 3/2023)    [facility]  RUL lobectomy (2022) with recurrent disease  History of arthroscopy of knee Right, 1987  History of tonsillectomy   History of hernia repair  H/O coronary angiogram    Allergies  No known allergies or intolerances  No known food or environmental allergies    Current Medications  aspirin enteric coated 81 milliGRAM(s) Oral daily  atorvastatin 80 milliGRAM(s) Oral at bedtime  carvedilol 3.125 milliGRAM(s) Oral daily  chlorhexidine 2% Cloths 1 Application(s) Topical <User Schedule>  clopidogrel Tablet 75 milliGRAM(s) Oral daily  DOBUTamine Infusion 2.5 MICROgram(s)/kG/Min (3.21 mL/Hr) IV Continuous <Continuous>  sodium bicarbonate 650 milliGRAM(s) Oral three times a day  acetaminophen     Tablet .. 650 milliGRAM(s) Oral every 6 hours PRN Temp greater or equal to 38C (100.4F), Mild Pain (1 - 3)  aluminum hydroxide/magnesium hydroxide/simethicone Suspension 30 milliLiter(s) Oral every 4 hours PRN Dyspepsia  melatonin 3 milliGRAM(s) Oral at bedtime PRN Insomnia  ondansetron Injectable 4 milliGRAM(s) IV Push every 8 hours PRN Nausea and/or Vomiting    Home Medications  aspirin 81 mg oral delayed release tablet: 1 tab(s) orally once a day (2024 02:59)  clopidogrel 75 mg oral tablet: 1 tab(s) orally once a day (2024 02:59)  FLUoxetine (Eqv-PROzac) 20 mg oral tablet: 1 tab(s) orally every 3 days (2024 02:59)  latanoprost 0.005% ophthalmic solution: 1 drop(s) in each eye once a day (2024 02:59)  mirtazapine 15 mg oral tablet: 1 tab(s) orally once a day (at bedtime) (2024 02:59)  temazepam 15 mg oral capsule: 1 cap(s) orally once a day (at bedtime) as needed for  insomnia (2024 02:59)    Psoc  No history of tobacco or ETOH use  No history of illicit drug use, substance abuse    Pfam  Family history of depression (Mother)  FH: CAD (coronary artery disease) (Sibling, Sibling)  Family history of diabetes mellitus (DM) (Sibling)    Review of Systems  	CONSTITUTIONAL: Denies fever, weight loss, or fatigue  	ENT: Denies dysphagia, difficulty chewing, tinnitus, blurred vision, double vision  	RESPIRATORY: See HPI  	CARDIOVASCULAR: See HPI  	GASTROINTESTINAL:  Denies nausea, vomiting, abdominal pain, diarrhea, constipation, melena, BRBPR, food intolerances  	GENITOURINARY: Denies dysuria, hematuria, urinary frequency, urinary incontinence  	NEUROLOGICAL: Denies headaches, syncope, near syncope, dizziness, lightheadedness, headaches, seizures  	SKIN: Denies rashes, masses, bruising, lesions   	MUSCULOSKELETAL: Denies history of OA or gout, joint swelling  	PSYCHIATRIC: Denies depression, anxiety, mood swings, or difficulty sleeping  	HEME: Denies history of DVT/PE, blood transfusion    Labs                        11.7   6.12  )-----------( 127      ( 2024 06:01 )             35.6     125<L>  |  91<L>  |  120<H>  ----------------------------<  91  4.6   |  20<L>  |  3.38<H>    Ca    8.3<L>      2024 06:01  Phos  6.5       Mg     2.9       TPro  6.6  /  Alb  2.8<L>  /  TBili  4.1<H>  /  DBili  x   /  AST  287<H>  /  ALT  192<H>  /  AlkPhos  442<H>      Lactic Acid Trend  24 @ 05:00   -   1.9  24 @ 01:00   -   2.7<H>    Trend Cardiac Enzymes  24 @ 08:32  JOT-RHODC-MIUHO-CPKMM/Trop I - -- - --  - --  -  --  /  95.4<H>  24 @ 03:00  YNO-OZASO-MNAAG-CPKMM/Trop I - -- - --  - --  -  --  /  97.3<H>  24 @ 01:00  IBA-PNKIX-MEURC-CPKMM/Trop I - -- - --  - --  -  --  /  105.4<H>    Cardiology  ECG  Ventricular Rate 59 BPM    Atrial Rate 59 BPM    P-R Interval 264 ms    QRS Duration 136 ms    Q-T Interval 528 ms    QTC Calculation(Bazett) 522 ms    P Axis 92 degrees    R Axis -70 degrees    T Axis 93 degrees    Diagnosis Line Sinus bradycardiawith 1st degree AV block  Left axis deviation  Right bundle branch block  Anteroseptal infarct (cited on or before 11-MAY-2022)    When compared with ECG of 26-MAY-2024 14:10,  premature ventricular complexes are no longer present  Confirmed by RIK PHIPPS MD () on 2024 9:09:54 AM    Transthoracic Echocardiogram    ACC: [id number] EXAM:  ECHO TTE WO CON COMP W DOPP                        PROCEDURE DATE:  2024    INTERPRETATION:  TRANSTHORACIC ECHOCARDIOGRAM REPORT  Patient Name:   ELYSE MALAVE Patient Location: [address]  [facility] Rec #:  [id number]            Accession #:      [id number]  Account #:      [id number]             Height:           72.0 in 183.0 cm  YOB: 1945           Weight:           187.4 lb 85.01 kg  Patient Age:    79 years            BSA:              2.07 m²  Patient Gender: M                   BP:               102/64 mmHg  Date of Exam:        2024 8:29:18 AM  Sonographer:         Gerald Lopez  Referring Physician: ALFONZO ARIZA    Procedure:     2D Echo/Doppler/Color Doppler Complete.  Indications:   I50.9 - Heart failure, unspecified  Diagnosis:     I50.9 - Heart failure, unspecified  Study Details: Technically fair study.        2D AND M-MODE MEASUREMENTS (normal ranges within parentheses):  Left                 Normal   Aorta/Left       Normal  Ventricle:                    Atrium:  IVSd (2D):    1.42  (0.7-1.1) Aortic Root    3.90  (2.4-3.7)                 cm             (2D):           cm  LVPWd (2D):   1.33  (0.7-1.1) Left Atrium    4.70  (1.9-4.0)                 cm       (2D):           cm  LVIDd (2D):   5.66  (3.4-5.7) LA Volume      38.5                 cm             Index         ml/m²  LVIDs (2D):   4.44            Right Ventricle:                 cm             TAPSE:           0.92 cm  LV FS (2D):   21.6   (>25%)                  %  Relative Wall 0.47   (<0.42)  Thickness    LV SYSTOLIC FUNCTION BY 2D PLANIMETRY (MOD):  EF-A4C View: 21.2 % EF-A2C View: 22.1 % EF-Biplane: 23 %    LV DIASTOLIC FUNCTION:  MV Peak E: 0.59 m/s Decel Time: 186 msec  MV Peak A: 0.45 m/s  E/A Ratio: 1.30    SPECTRAL DOPPLER ANALYSIS (where applicable):  Mitral Valve:  MV P1/2 Time: 53.94 msec  MV Area, PHT: 4.08 cm²    Mitral Insufficiency by PISA:  MR Volume: 45.43 ml MR Flow Rate: 119.43 ml/s MR EROA: 28.57 mm²    Aortic Valve: AoV Max Dave: 0.72 m/s AoV Peak P.1 mmHg AoV Mean P.2 mmHg    LVOT Vmax: 0.48 m/s LVOT VTI: 0.110 m LVOT Diameter: 2.50 cm    AoV Area, Vmax: 3.26 cm² AoV Area, VTI: 3.53 cm² AoV Area, Vmn: 3.05 cm²  Ao VTI: 0.153  Aortic Insufficiency:  AI Half-time:  658 msec  AI Decel Rate: 1.51 m/s²    Tricuspid Valve and PA/RV Systolic Pressure: TR Max Velocity: 2.72 m/s RA   Pressure: 15 mmHg RVSP/PASP: 44.6 mmHg      PHYSICIAN INTERPRETATION:  Left Ventricle: The left ventricular internal cavity size is mildly   increased. Left ventricular wall thickness is increased. There is mild   concentric left ventricular hypertrophy involving the global wall. The   LVH involves global walls.  Global LV systolic function was severely decreased.Left ventricular   ejection fraction, by visual estimation, is <20%. The interventricular   septum is flattened in systole and diastole, consistent with right   ventricular pressure and volume overload. Spectral Doppler shows normal   pattern of LV diastolic filling.      LV Wall Scoring:  The RCA distribution, entire septum, and entire apex are akinetic. The mid  anterior segment is hypokinetic. All remaining scored segments are normal.    Right Ventricle: The right ventricular size is severely enlarged. RV   systolic function is severely reduced. TV S' 0.1 m/s.  Left Atrium: Mildly enlarged left atrium. LA volume Index is 38.5 ml/m²   ml/m2.  Right Atrium: Severely enlarged right atrium.  Pericardium: Trivial pericardial effusion is present. The pericardial   effusion is localized near the right ventricle.  Mitral Valve: Structurally normal mitral valve, with normal leaflet   excursion. Moderate mitral valve regurgitation is seen. The MR jet is   centrally-directed. Diastolic mitral regurgitation is present.  Tricuspid Valve: Structurally normal tricuspid valve, with normal leaflet   excursion. Moderate-severe tricuspid regurgitation is visualized.   Estimated pulmonary artery systolic pressure is 44.6 mmHg assuming a   right atrialpressure of 15 mmHg, which is consistent with mild pulmonary   hypertension.  Aortic Valve: Normal trileaflet aortic valve with normal opening. Mild   aortic valve regurgitation is seen.  Pulmonic Valve: Structurally normal pulmonic valve, with normal leaflet   excursion. Mild pulmonic valve regurgitation.  Aorta: The aortic root and ascending aorta are structurally normal, with   no evidence of dilitation. The aortic arch was not well visualized.  Pulmonary Artery: The main pulmonary artery is normal in size.  Venous: The inferior vena cava is abnormal. The inferior vena cava was   dilated, with respiratory size variation less tan 50%.      Summary:   1. Biatrial enlargement   2. Left ventricular ejection fraction, by visual estimation, is <20%.   3. Severely decreased global left ventricular systolic function.   4. Entire septum, entire apex, and mid anterior segment are abnormal as   described above.   5. Increased LV wall thickness.   6. Mildly increased left ventricular internal cavity size.   7. Right ventricular volume and pressure overload.   8. There is mild concentric left ventricular hypertrophy.   9. Severely enlarged right ventricle.  10. Severely reduced RV systolic function.  11. Moderate mitral valve regurgitation.  12. Moderate-severe tricuspid regurgitation.  13. Mild aortic regurgitation.  14. Mild pulmonic valve regurgitation.  15. Estimated pulmonary artery systolic pressure is 44.6 mmHg assuming a   right atrial pressure of 15 mmHg, which is consistent with mild pulmonary   hypertension.  16. LA volume Index is 38.5 ml/m² ml/m2.    [id number] Rik Phipps , Electronically signed on 2024   at 1:34:30 PM    Vital Signs Last 24 Hrs  T(C): 36.8 (2024 06:09), Max: 36.8 (2024 06:09)  T(F): 98.2 (2024 06:09), Max: 98.2 (2024 06:09)  HR: 64 (2024 06:09) (60 - 67)  BP: 95/59 (2024 06:09) (88/65 - 110/58)  RR: 16 (2024 06:09) (16 - 18)  SpO2: 99% 2L NC (2024 06:09) (97% - 100%)    Physical Exam  Gen:  WN/WD Male sitting in chair, appears mildly distressed  ENT:  NC/AT, no JVD noted  Thorax:  Symmetric, no retractions  Lung:  Scattered rales throughout  CV:  Heart sounds distant, S1, S2. RRR  Abd:  Soft, NT/ND.  BS normoactive, no masses to palp  Extrem:  Warm, pink.  Trace edema in LE's bilaterally to ankle  Neuro:  No gross motor/sensory deficits  Psych:  Awake, alert and calm

## 2024-06-07 NOTE — PROGRESS NOTE ADULT - SUBJECTIVE AND OBJECTIVE BOX
Seen in ICU, on NC O2, weak    Vital Signs Last 24 Hrs  T(C): 35.9 (24 @ 17:00), Max: 36.8 (24 @ 06:09)  T(F): 96.7 (24 @ 17:00), Max: 98.2 (24 @ 06:09)  HR: 68 (24 @ 20:00) (62 - 70)  BP: 104/45 (24 @ 20:00) (91/55 - 117/75)  BP(mean): 57 (24 @ 20:00) (57 - 92)  RR: 15 (24 @ 20:00) (12 - 20)  SpO2: 99% (24 @ 20:00) (99% - 100%)    I&O's Detail    2024 07:01  -  2024 07:00  --------------------------------------------------------  IN:    Oral Fluid: 945 mL  Total IN: 945 mL    OUT:    Intermittent Catheterization - Urethral (mL): 600 mL  Total OUT: 600 mL    2024 07:01  -  2024 22:35  --------------------------------------------------------  IN:    DOBUTamine: 10.5 mL    DOBUTamine: 21 mL  Total IN: 31.5 mL    OUT:    Indwelling Catheter - Urethral (mL): 600 mL  Total OUT: 600 mL    s1s2  b/l air entry  soft, NT  + edema                         11.7   6.12  )-----------( 127      ( 2024 06:01 )             35.6     2024 06:01    125    |  91     |  120    ----------------------------<  91     4.6     |  20     |  3.38     Ca    8.3        2024 06:01  Phos  6.5       2024 08:32  Mg     2.9       2024 06:01    TPro  6.6    /  Alb  2.8    /  TBili  4.1    /  DBili  x      /  AST  287    /  ALT  192    /  AlkPhos  442    2024 06:01    LIVER FUNCTIONS - ( 2024 06:01 )  Alb: 2.8 g/dL / Pro: 6.6 g/dL / ALK PHOS: 442 U/L / ALT: 192 U/L / AST: 287 U/L / GGT: x           acetaminophen     Tablet .. 650 milliGRAM(s) Oral every 6 hours PRN  aluminum hydroxide/magnesium hydroxide/simethicone Suspension 30 milliLiter(s) Oral every 4 hours PRN  aspirin enteric coated 81 milliGRAM(s) Oral daily  chlorhexidine 2% Cloths 1 Application(s) Topical <User Schedule>  clopidogrel Tablet 75 milliGRAM(s) Oral daily  DOBUTamine Infusion 5 MICROgram(s)/kG/Min IV Continuous <Continuous>  heparin   Injectable 5000 Unit(s) SubCutaneous every 12 hours  melatonin 3 milliGRAM(s) Oral at bedtime PRN  ondansetron Injectable 4 milliGRAM(s) IV Push every 8 hours PRN  pantoprazole    Tablet 40 milliGRAM(s) Oral before breakfast  rosuvastatin 20 milliGRAM(s) Oral at bedtime  sodium bicarbonate 650 milliGRAM(s) Oral three times a day  tamsulosin 0.8 milliGRAM(s) Oral at bedtime    A/P:    CM, EF < 20%, lung ca, mod MR, mod-severe TR  Cardio-renal BRODY/CKD 3 (Cr 2.14 - 24)   Hypervolemic hyponatremia iso BRODY/CKD, CHF, malignancy   CT A/P w/o hydro 24  UA negative 24  Resp distress iso CM, lung ca  V/Q scan w/low prob for PE 24  Tx to ICU, on  qtt  Avoid nephrotoxins, no NSAID's  Overall options are limited and prognosis is guarded  For pt and family most important goal is his comfort  No objections for diuretics to help w/symptoms if BP can tolerate   D/w ICU team     [phone]

## 2024-06-07 NOTE — PROGRESS NOTE ADULT - SUBJECTIVE AND OBJECTIVE BOX
Patient is a 79y old  Male who presents with a chief complaint of chf (07 Jun 2024 10:38)      INTERVAL HPI/OVERNIGHT EVENTS: Patient seen and examined at bedside. No overnight events.    MEDICATIONS  (STANDING):  aspirin enteric coated 81 milliGRAM(s) Oral daily  atorvastatin 80 milliGRAM(s) Oral at bedtime  carvedilol 3.125 milliGRAM(s) Oral daily  chlorhexidine 2% Cloths 1 Application(s) Topical <User Schedule>  clopidogrel Tablet 75 milliGRAM(s) Oral daily  DOBUTamine Infusion 2.5 MICROgram(s)/kG/Min (3.21 mL/Hr) IV Continuous <Continuous>  sodium bicarbonate 650 milliGRAM(s) Oral three times a day    MEDICATIONS  (PRN):  acetaminophen     Tablet .. 650 milliGRAM(s) Oral every 6 hours PRN Temp greater or equal to 38C (100.4F), Mild Pain (1 - 3)  aluminum hydroxide/magnesium hydroxide/simethicone Suspension 30 milliLiter(s) Oral every 4 hours PRN Dyspepsia  melatonin 3 milliGRAM(s) Oral at bedtime PRN Insomnia  ondansetron Injectable 4 milliGRAM(s) IV Push every 8 hours PRN Nausea and/or Vomiting      Allergies    No Known Allergies    Intolerances        REVIEW OF SYSTEMS:  CONSTITUTIONAL: No fever or chills  HEENT:  No headache, no sore throat  RESPIRATORY: No cough, wheezing, or shortness of breath  CARDIOVASCULAR: No chest pain, palpitations  GASTROINTESTINAL: No abd pain, nausea, vomiting, or diarrhea  GENITOURINARY: No dysuria, frequency, or hematuria  NEUROLOGICAL: no focal weakness or dizziness  MUSCULOSKELETAL: no myalgias     Vital Signs Last 24 Hrs  T(C): 36.8 (07 Jun 2024 06:09), Max: 36.8 (07 Jun 2024 06:09)  T(F): 98.2 (07 Jun 2024 06:09), Max: 98.2 (07 Jun 2024 06:09)  HR: 64 (07 Jun 2024 06:09) (60 - 67)  BP: 95/59 (07 Jun 2024 06:09) (88/65 - 110/58)  BP(mean): --  RR: 16 (07 Jun 2024 06:09) (16 - 18)  SpO2: 99% (07 Jun 2024 06:09) (97% - 100%)    Parameters below as of 07 Jun 2024 06:09  Patient On (Oxygen Delivery Method): nasal cannula  O2 Flow (L/min): 2    I&O's Summary    06 Jun 2024 07:01  -  07 Jun 2024 07:00  --------------------------------------------------------  IN: 945 mL / OUT: 600 mL / NET: 345 mL      BMI (kg/m2): 25.6 (06-06-24 @ 00:33)    PHYSICAL EXAM:  Constitutional: Pt sitting in chair, visibly uncomfortable with 2L NC  HEENT: +b/l scleral icterus, EOMI, normal hearing, moist mucous membranes  Neck: Soft and supple, no JVD  Respiratory: CTABL, mild decrease breath sounds at Rt base, No wheezing, rales or rhonchi  Cardiovascular: S1S2+, RRR, no M/G/R  Gastrointestinal: BS+, soft, nontender, distended, +fluid wave, no guarding, no rebound, neg Velasco's sign  Extremities: +B/L pitting edema  Vascular: 2+ peripheral pulses  Neurological: AAOx3, no focal deficits  Musculoskeletal: moving all extremities  Skin: +bruises on chin and throat, +Lt eyelid bruise, +extensive bruise on Lt forearm, No rashes        LABS: Personally reviewed  CBC                        11.7   6.12  )-----------( 127      ( 07 Jun 2024 06:01 )             35.6     CMP  06-07    125  |  91  |  120  ----------------------------<  91  4.6   |  20  |  3.38    Ca    8.3      07 Jun 2024 06:01  Phos  6.5     06-06  Mg     2.9     06-07    TPro  6.6  /  Alb  2.8  /  TBili  4.1  /  DBili  x   /  AST  287  /  ALT  192  /  AlkPhos  442  06-07            Lactate, Blood: 1.9 mmol/L (06-06 @ 05:00)  Lactate, Blood: 2.7 mmol/L (06-06 @ 01:00)      CARDIAC MARKERS ( 06 Jun 2024 08:32 )  x     / 95.4 ng/L / x     / x     / x      CARDIAC MARKERS ( 06 Jun 2024 03:00 )  x     / 97.3 ng/L / x     / x     / x      CARDIAC MARKERS ( 06 Jun 2024 01:00 )  x     / 105.4 ng/L / x     / x     / x                ABG - ( 06 Jun 2024 01:20 )  pH, Arterial: 7.39  pH, Blood: x     /  pCO2: 21    /  pO2: 117   / HCO3: 13    / Base Excess: -12.3 /  SaO2: 98.7                        Urinalysis Basic - ( 07 Jun 2024 06:01 )    Color: x / Appearance: x / SG: x / pH: x  Gluc: 91 mg/dL / Ketone: x  / Bili: x / Urobili: x   Blood: x / Protein: x / Nitrite: x   Leuk Esterase: x / RBC: x / WBC x   Sq Epi: x / Non Sq Epi: x / Bacteria: x                RADIOLOGY & ADDITIONAL TESTS: Personally reviewed.     Consultant(s) Notes Reviewed:  [x] YES  [ ] NO   Discussed with LOPEZ/COURTNEY, RN

## 2024-06-07 NOTE — CONSULT NOTE ADULT - SUBJECTIVE AND OBJECTIVE BOX
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  CT chest in early April 2024 showed left upper lobe nodule within the radiation field of more rounded contour, gradually becoming more discrete compared to prior studies.  Oncology consultation requested as above.    PAST MEDICAL & SURGICAL HISTORY:  BPH (benign prostatic hyperplasia)  Bipolar disorder  Depression  Anxiety  Umbilical hernia, incarcerated  Depression  Constipation  Urinary retention  CAD (coronary artery disease)  SCC (squamous cell carcinoma)  Lung mass  Other nonspecific abnormal finding of lung field  LV (left ventricular) mural thrombus  History of inguinal hernia  History of CHF (congestive heart failure)  History of arthroscopy of knee-Right, 1987  History of tonsillectomy -1952  History of hernia repair  H/O coronary angiogram    MEDICATION:  aspirin enteric coated 81 milliGRAM(s) Oral daily  atorvastatin 80 milliGRAM(s) Oral at bedtime  carvedilol 3.125 milliGRAM(s) Oral every 12 hours  clopidogrel Tablet 75 milliGRAM(s) Oral daily  FLUoxetine 20 milliGRAM(s) Oral <User Schedule>  latanoprost 0.005% Ophthalmic Solution 1 Drop(s) Both EYES at bedtime  mirtazapine 15 milliGRAM(s) Oral at bedtime    ALLERGIES:  No Known Allergies    FAMILY HISTORY:  Reviewed, non-contributory: [x ]     SOCIAL HISTORY:  Tobacco: YES [ ]  ; NO [ ]; Former smoker [x ]  Alcohol:   YES [ ]  ; NO [ x]; Social alcohol user [ ]    REVIEW SYSTEMS:  Constitutional: no fever, chills, night sweats, no weight loss  HEENT: denies visual changes; no oral ulcers, dysphagia, no epistaxis;   Respiratory: no dyspnea , wheezing, cough, hemoptysis  Cardiovascular: denies acute chest pain, palpitations  GI: no loss of appetite, dark stools, or abdominal tenderness / pain; no change in bowel habits.  Musculoskeletal: no new back pain, bone/ joint pain ,no extremity swelling  Integumentary: denies pruritus; no skin rash, bruises, no  suspicious skin lesions  Neurologic: denies peripheral numbness, no dizziness, no gait problems.  Heme: no reported easy bruisability; no lymph node enlargement    VITALS:  T(C): 36.6, Max: 36.6 (05-28-24 @ 06:40)  T(F): 97.8, Max: 97.8 (05-28-24 @ 06:40)  HR: 70 (70 - 81)  BP: 104/63 (104/63 - 122/81)  SpO2: 100% (93% - 100%)    PHYSICAL EXAM:  Constitutional: alert, well developed  HEENT: normocephalic, anicteric sclerae, no mucositis or thrush  Respiratory: bilateral clear to auscultation anteriorly  Cardiovascular : S1, S2 regular, rhythmic, no murmurs, gallops or rubs  Abdomen: soft, distended, + normoactive BS, no palpable HS- megaly  Extremities: no tenderness;  -c/c/e, pulses equal bilaterally  Integumentary: no rashes, scars, or lesions suggestive of malignancy  Neurologic: no gross focal deficits    LABS:  (05-28) WBC: 6.38 K/uL,Hemoglobin: 11.6 g/dL, Hematocrit: 34.3 %,  Platelet: 165 K/uL  (05-28) Na: 138 mmol/L ; K: 3.9 mmol/L ; BUN: 88 mg/dL ; Cr: 2.35 mg/dL.    RADIOLOGY:  ACC: [id number] EXAM:  CT CHEST   ORDERED BY: JASWINDER DUNBAR     PROCEDURE DATE:  04/01/2024          INTERPRETATION:  INDICATION: History of lung cancer treated with   stereotactic radiation to the left upper lobe in early 2023,   indeterminate findings on recent chest CT    TECHNIQUE: Helical acquisition images of the chest without intravenous   contrast. Maximum intensity projection images were generated.    COMPARISON: Most recent exam of PET/CT 1/14/2024.    FINDINGS:    LUNGS/AIRWAYS/PLEURA: Overall similar size of left lobe 2.3 cm solid   nodule within the radiation field, although now has a more rounded   contour (3-46), gradually becoming more discrete over multiple prior   studies. Right upper lobectomy. Unchanged mild indistinct groundglass in   the superior aspect of the right middle lobe (3-43). No pleural effusion.    LYMPH NODES/MEDIASTINUM: No lymphadenopathy.    HEART/VASCULATURE: Enlarged heart. No pericardial effusion. Coronary   artery calcifications. Normal caliber aorta.    UPPER ABDOMEN: Cholelithiasis. Unchanged subcentimeter hypodensity in the   liver, too small to characterize. Unchanged left adrenal nodule, likely   an adenoma. Partially included right renal cyst.    BONES/SOFT TISSUES: Unremarkable.        IMPRESSION:    Left upper lobe nodule within the radiation field has a more rounded   contour, and gradually become more discrete over multiple prior studies.   Neoplasm should be considered.               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  Patient was in the sitting position when he fell forward and hit his chin and left arm, with noticeable bruising on physical examination.  Of note, CT chest in early April 2024 showed left upper lobe nodule within the radiation field of more rounded contour, gradually becoming more discrete compared to prior studies.  Adjustment in cardiac medication done by cardiology (Dr. Walden reduced Coreg by 50%).  Oncology consulted in light of evidence of POD on CT chest.    PAST MEDICAL & SURGICAL HISTORY:  BPH (benign prostatic hyperplasia)  Bipolar disorder  Depression  Anxiety  Umbilical hernia, incarcerated  Depression  Constipation  Urinary retention  CAD (coronary artery disease)  SCC (squamous cell carcinoma)  Lung mass  Other nonspecific abnormal finding of lung field  LV (left ventricular) mural thrombus  History of inguinal hernia  History of CHF (congestive heart failure)  History of arthroscopy of knee-Right, 1987  History of tonsillectomy -1952  History of hernia repair  H/O coronary angiogram    MEDICATION:  aspirin enteric coated 81 milliGRAM(s) Oral daily  atorvastatin 80 milliGRAM(s) Oral at bedtime  carvedilol 3.125 milliGRAM(s) Oral every 12 hours  clopidogrel Tablet 75 milliGRAM(s) Oral daily  FLUoxetine 20 milliGRAM(s) Oral <User Schedule>  latanoprost 0.005% Ophthalmic Solution 1 Drop(s) Both EYES at bedtime  mirtazapine 15 milliGRAM(s) Oral at bedtime    ALLERGIES:  No Known Allergies    FAMILY HISTORY:  Reviewed, non-contributory: [x ]     SOCIAL HISTORY:  Tobacco: YES [ ]  ; NO [ ]; Former smoker [x ]  Alcohol:   YES [ ]  ; NO [ x]; Social alcohol user [ ]    REVIEW SYSTEMS:  Constitutional: no fever, chills, night sweats, no weight loss  HEENT: denies visual changes; no oral ulcers, dysphagia, no epistaxis;   Respiratory: + dyspnea , some wheezing, +cough, no hemoptysis  Cardiovascular: denies acute chest pain, palpitations  GI: no loss of appetite, dark stools, or abdominal tenderness / pain; no change in bowel habits.  Musculoskeletal: no new back pain, bone/ joint pain ,no extremity swelling  Integumentary: denies pruritus; no skin rash, bruises, no  suspicious skin lesions  Neurologic: denies peripheral numbness, no dizziness, no gait problems.  Heme: no reported easy bruisability; no lymph node enlargement    VITALS:  T(C): 36.6, Max: 36.6 (05-28-24 @ 06:40)  T(F): 97.8, Max: 97.8 (05-28-24 @ 06:40)  HR: 70 (70 - 81)  BP: 104/63 (104/63 - 122/81)  SpO2: 100% (93% - 100%)    PHYSICAL EXAM:  Constitutional: alert, well developed  HEENT: normocephalic, anicteric sclerae, no mucositis or thrush  Respiratory: bilateral clear to auscultation anteriorly  Cardiovascular : S1, S2 regular, rhythmic, no murmurs, gallops or rubs  Abdomen: soft, distended, + normoactive BS, no palpable HS- megaly  Extremities: no tenderness;  -c/c/e, pulses equal bilaterally  Integumentary: no rashes, scars, or lesions suggestive of malignancy  Neurologic: no gross focal deficits    LABS:  (05-28) WBC: 6.38 K/uL,Hemoglobin: 11.6 g/dL, Hematocrit: 34.3 %,  Platelet: 165 K/uL  (05-28) Na: 138 mmol/L ; K: 3.9 mmol/L ; BUN: 88 mg/dL ; Cr: 2.35 mg/dL.    RADIOLOGY:  ACC: [id number] EXAM:  CT BRAIN   ORDERED BY: CATHI MONTES     PROCEDURE DATE:  06/06/2024          INTERPRETATION:  NONCONTRAST CT OF THE BRAIN DATED 6/6/2024.    CLINICAL INFORMATION:  Head injury.    TECHNIQUE: Axial CT images are obtained from the cranial vertex to the   skull base without the administration of IV contrast. Images are   reformatted in sagittal and coronal planes.  The study is correlated with an MRI of the brain from 2/1/2023 and PET/CT   from 5/5/2024..  FINDINGS:  There is no acute intra-axial or extra-axial hemorrhage. There are dense   calcifications in the left basal ganglia which are seen on prior PET CTs.   There is no mass effect or shift of the midline. The basal cisterns are   not effaced. There is cerebral and cerebellar volume loss with prominence   of the ventricles, sulci, and cerebellar folia. There are mild chronic   ischemic changes in the frontoparietal white matter. There are   atherosclerotic calcifications of the intracranial carotid arteries.    There is no significant scalp soft tissue swelling or scalp hematoma. The   skull base and bony calvarium are intact. The visualized paranasal   sinuses and tympanic/mastoid cavities are clear apart from minimal   bilateral ethmoid mucosal thickening and a left middle ear and mastoid   effusion.    IMPRESSION:  No acute intracranial hemorrhage, mass effect, or acute osseous fracture.  Dense calcifications in the left basal ganglia unchanged compared to   prior PET CTs.  Left middleear and mastoid effusion. Correlate with clinical exam.

## 2024-06-07 NOTE — PROGRESS NOTE ADULT - ATTENDING COMMENTS
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 Pt was BIBEMS to  ED due to lethargy and hypotension tonight after taking temazepam, Prozac, flomax and Coreg. Pt is admitted for syncope and complicated care coordination PLan: pulm recs apprec, ICU will attempt dobumtamine infusion for symptomatic relief, pt is appropiate for home hospice and comfort measures, pt and wife agreeable, monitor clinical course, poor prognosis multiorgan failure

## 2024-06-07 NOTE — PROGRESS NOTE ADULT - SUBJECTIVE AND OBJECTIVE BOX
Follow up for chf  SUBJ:Patient continues to do poorly.  He is more weak and more dyspneic than yesterday.  Unable to tolerate  heart failure meds due to hypotension and renal insufficiency  PMH  BPH (benign prostatic hyperplasia)    Bipolar disorder    Depression    Anxiety    Umbilical hernia, incarcerated    Inguinal hernia of right side without obstruction or gangrene    Depression    Constipation    Urinary retention    CAD (coronary artery disease)    SCC (squamous cell carcinoma)    Lung mass    Other nonspecific abnormal finding of lung field    LV (left ventricular) mural thrombus    History of inguinal hernia    Severe left ventricular systolic dysfunction    History of CHF (congestive heart failure)    CHF, chronic    Heart failure with reduced ejection fraction        MEDICATIONS  (STANDING):  aspirin enteric coated 81 milliGRAM(s) Oral daily  atorvastatin 80 milliGRAM(s) Oral at bedtime  carvedilol 3.125 milliGRAM(s) Oral daily  clopidogrel Tablet 75 milliGRAM(s) Oral daily  sodium bicarbonate 650 milliGRAM(s) Oral three times a day    MEDICATIONS  (PRN):  acetaminophen     Tablet .. 650 milliGRAM(s) Oral every 6 hours PRN Temp greater or equal to 38C (100.4F), Mild Pain (1 - 3)  aluminum hydroxide/magnesium hydroxide/simethicone Suspension 30 milliLiter(s) Oral every 4 hours PRN Dyspepsia  melatonin 3 milliGRAM(s) Oral at bedtime PRN Insomnia  ondansetron Injectable 4 milliGRAM(s) IV Push every 8 hours PRN Nausea and/or Vomiting        PHYSICAL EXAM:  Vital Signs Last 24 Hrs  T(C): 36.8 (07 Jun 2024 06:09), Max: 36.8 (07 Jun 2024 06:09)  T(F): 98.2 (07 Jun 2024 06:09), Max: 98.2 (07 Jun 2024 06:09)  HR: 64 (07 Jun 2024 06:09) (60 - 67)  BP: 95/59 (07 Jun 2024 06:09) (88/65 - 110/58)  BP(mean): --  RR: 16 (07 Jun 2024 06:09) (16 - 18)  SpO2: 99% (07 Jun 2024 06:09) (97% - 100%)    Parameters below as of 07 Jun 2024 06:09  Patient On (Oxygen Delivery Method): nasal cannula  O2 Flow (L/min): 2      GENERAL: slight tachypnea, well-groomed, well-developed  HEAD:  Atraumatic, Normocephalic  EYES: EOMI, PERRLA, conjunctiva and sclera clear  ENT: Moist mucous membranes,  NECK: Supple, No JVD, no bruits  CHEST/LUNG: decreased bs bilaterally  HEART: Regular rate and rhythm; No murmurs, rubs, or gallops PMI non displaced.  ABDOMEN: Soft, Nontender, Nondistended; Bowel sounds present  EXTREMITIES:  1 plus edema  NERVOUS SYSTEM:  Alert & Oriented X3, Good concentration; Motor Strength 5/5 B/L upper and lower extremities; DTRs 2+ intact and symmetric      TELEMETRY:rsr        LABS:                        11.7   6.12  )-----------( 127      ( 07 Jun 2024 06:01 )             35.6     06-07    125<L>  |  91<L>  |  120<H>  ----------------------------<  91  4.6   |  20<L>  |  3.38<H>    Ca    8.3<L>      07 Jun 2024 06:01  Phos  6.5     06-06  Mg     2.9     06-07    TPro  6.6  /  Alb  2.8<L>  /  TBili  4.1<H>  /  DBili  x   /  AST  287<H>  /  ALT  192<H>  /  AlkPhos  442<H>  06-07            I&O's Summary    06 Jun 2024 07:01  -  07 Jun 2024 07:00  --------------------------------------------------------  IN: 945 mL / OUT: 600 mL / NET: 345 mL      BNP    RADIOLOGY & ADDITIONAL STUDIES:    ECHO:

## 2024-06-07 NOTE — CONSULT NOTE ADULT - PROBLEM SELECTOR RECOMMENDATION 9
in the setting of EF less than 20% with valvular disease and right heart failure.   Cardiogenic shock likely cause of worsening CKD, transaminitis and lactic acidosis.  Continue Coreg as tolerated, transfer to ICU for ionotropic support with Dobutamine.  Cardiac monitoring, strict intake and out puts, monitor electrolytes. in the setting of EF less than 20% with valvular disease and right heart failure.   Cardiogenic shock likely cause of worsening CKD, transaminitis and lactic acidosis.  Hold Coreg, transfer to ICU for ionotropic support with Dobutamine.  Supplemental oxygen as needed. Cardiac monitoring, strict intake and out puts, monitor electrolytes and LFTs.  Not a candidate for LVAD due to co-morbidities including likely active cancer

## 2024-06-07 NOTE — CONSULT NOTE ADULT - PROBLEM SELECTOR RECOMMENDATION 7
BMP/CBC/COVID-19 Heparin/Protonix  Activity and diet as tolerated  DNR/DNI  Prognosis poor, palliative care following  Home hospice under consideration by family, awaiting to see how patient responds to Dobutamine

## 2024-06-07 NOTE — PROGRESS NOTE ADULT - SUBJECTIVE AND OBJECTIVE BOX
SUBJECTIVE AND OBJECTIVE:  Indication for Geriatrics and Palliative Care Services/INTERVAL HPI:  Patient was transferred to ICU for dobutamine drip. Kenia(wife) agreed on home hospice, with DNR/DNI, as per the primary team. Spoke with Kenia at the bedside regarding patient prognosis and plan.       OVERNIGHT EVENTS:    Allergies    No Known Allergies    Intolerances    MEDICATIONS  (STANDING):  aspirin enteric coated 81 milliGRAM(s) Oral daily  atorvastatin 80 milliGRAM(s) Oral at bedtime  chlorhexidine 2% Cloths 1 Application(s) Topical <User Schedule>  clopidogrel Tablet 75 milliGRAM(s) Oral daily  DOBUTamine Infusion 2.5 MICROgram(s)/kG/Min (3.21 mL/Hr) IV Continuous <Continuous>  sodium bicarbonate 650 milliGRAM(s) Oral three times a day    MEDICATIONS  (PRN):  acetaminophen     Tablet .. 650 milliGRAM(s) Oral every 6 hours PRN Temp greater or equal to 38C (100.4F), Mild Pain (1 - 3)  aluminum hydroxide/magnesium hydroxide/simethicone Suspension 30 milliLiter(s) Oral every 4 hours PRN Dyspepsia  melatonin 3 milliGRAM(s) Oral at bedtime PRN Insomnia  ondansetron Injectable 4 milliGRAM(s) IV Push every 8 hours PRN Nausea and/or Vomiting      ITEMS UNCHECKED ARE NOT PRESENT    PRESENT SYMPTOMS: [X ]Unable to self-report   Source if other than patient:  [ ]Family   [ ]Team     Pain:  [ ]yes [ ]no  QOL impact -   Location -                    Aggravating factors -  Quality -  Radiation -  Timing-  Severity (0-10 scale):  Minimal acceptable level (0-10 scale):     Dyspnea:                           [ ]Mild [ ]Moderate [ ]Severe  Anxiety:                             [ ]Mild [ ]Moderate [ ]Severe  Fatigue:                             [ ]Mild [ ]Moderate [ ]Severe  Nausea:                             [ ]Mild [ ]Moderate [ ]Severe  Loss of appetite:              [ ]Mild [ ]Moderate [ ]Severe  Constipation:                    [ ]Mild [ ]Moderate [ ]Severe      Other Symptoms:  [ ]All other review of systems negative     Chaplaincy Referral: [ ] yes [ ] refused [ ] following [ ] Deferred   Palliative Performance Status Version 2:         %      http://npcrc.org/files/news/palliative_performance_scale_ppsv2.pdf    PHYSICAL EXAM:  Vital Signs Last 24 Hrs  T(C): 34.6 (07 Jun 2024 11:26), Max: 36.8 (07 Jun 2024 06:09)  T(F): 94.2 (07 Jun 2024 11:26), Max: 98.2 (07 Jun 2024 06:09)  HR: 65 (07 Jun 2024 13:00) (62 - 70)  BP: 107/74 (07 Jun 2024 13:00) (88/65 - 107/74)  BP(mean): 85 (07 Jun 2024 13:00) (85 - 86)  RR: 13 (07 Jun 2024 13:00) (13 - 18)  SpO2: 99% (07 Jun 2024 13:00) (97% - 100%)    Parameters below as of 07 Jun 2024 12:30  Patient On (Oxygen Delivery Method): nasal cannula  O2 Flow (L/min): 2   I&O's Summary    06 Jun 2024 07:01  -  07 Jun 2024 07:00  --------------------------------------------------------  IN: 945 mL / OUT: 600 mL / NET: 345 mL    07 Jun 2024 07:01  -  07 Jun 2024 14:59  --------------------------------------------------------  IN: 7 mL / OUT: 0 mL / NET: 7 mL       GENERAL: [ ]Cachexia    [ ]Alert  [ ]Oriented x   [ ]Lethargic  [ ]Unarousable  [ ]Verbal  [ ]Non-Verbal  Behavioral:   [ ]Anxiety  [ ]Delirium [ ]Agitation [ ]Other  HEENT:  [ ]Normal   [ ]Dry mouth   [ ]ET Tube/Trach  [ ]Oral lesions  PULMONARY:   [ ]Clear [ ]Tachypnea  [ ]Audible excessive secretions   [ ]Rhonchi        [ ]Right [ ]Left [ ]Bilateral  [ ]Crackles        [ ]Right [ ]Left [ ]Bilateral  [ ]Wheezing     [ ]Right [ ]Left [ ]Bilateral  [ ]Diminished BS [ ] Right [ ]Left [ ]Bilateral  CARDIOVASCULAR:    [ ]Regular [ ]Irregular [ ]Tachy  [ ]Christiano [ ]Murmur [ ]Other  GASTROINTESTINAL:  [ ]Soft  [ ]Distended   [ ]+BS  [ ]Non tender [ ]Tender  [ ]Other [ ]PEG [ ]OGT/ NGT   Last BM:   GENITOURINARY:  [ ]Normal [ ]Incontinent   [ ]Oliguria/Anuria   [ ]Chandler  MUSCULOSKELETAL:   [ ]Normal   [ ]Weakness  [ ]Bed/Wheelchair bound [ ]Edema  NEUROLOGIC:   [ ]No focal deficits  [ ] Cognitive impairment  [ ] Dysphagia [ ]Dysarthria [ ] Paresis [ ]Other   SKIN:   [ ]Normal  [ ]Rash  [ ]Other  [ ]Pressure ulcer(s) [ ]y [ ]n present on admission    CRITICAL CARE:  [ ]Shock Present  [ ]Septic [ ]Cardiogenic [ ]Neurologic [ ]Hypovolemic  [ ]Vasopressors [ ]Inotropes  [ ]Respiratory failure present [ ]Mechanical Ventilation [ ]Non-invasive ventilatory support [ ]High-Flow   [ ]Acute  [ ]Chronic [ ]Hypoxic  [ ]Hypercarbic [ ]Other  [ ]Other organ failure     LABS:                        11.7   6.12  )-----------( 127      ( 07 Jun 2024 06:01 )             35.6   06-07    125<L>  |  91<L>  |  120<H>  ----------------------------<  91  4.6   |  20<L>  |  3.38<H>    Ca    8.3<L>      07 Jun 2024 06:01  Phos  6.5     06-06  Mg     2.9     06-07    TPro  6.6  /  Alb  2.8<L>  /  TBili  4.1<H>  /  DBili  x   /  AST  287<H>  /  ALT  192<H>  /  AlkPhos  442<H>  06-07      Urinalysis Basic - ( 07 Jun 2024 06:01 )    Color: x / Appearance: x / SG: x / pH: x  Gluc: 91 mg/dL / Ketone: x  / Bili: x / Urobili: x   Blood: x / Protein: x / Nitrite: x   Leuk Esterase: x / RBC: x / WBC x   Sq Epi: x / Non Sq Epi: x / Bacteria: x      RADIOLOGY & ADDITIONAL STUDIES:    Protein Calorie Malnutrition Present: [ ]mild [ ]moderate [ ]severe [ ]underweight [ ]morbid obesity  https://www.[employer].org/vault/2440/web/files/ONC/Table_Clinical%20Characteristics%20to%20Document%20Malnutrition-White%20JV%20et%20al%202012.pdf    Height (cm): 182.9 (06-07-24 @ 12:30), 182.9 (05-30-24 @ 07:31), 182.9 (05-25-24 @ 11:08)  Weight (kg): 93.6 (06-07-24 @ 12:30), 84.4 (05-30-24 @ 07:31), 82.6 (05-25-24 @ 11:08)  BMI (kg/m2): 28 (06-07-24 @ 12:30), 25.2 (05-30-24 @ 07:31), 24.7 (05-25-24 @ 11:08)    [ ]PPSV2 < or = 30%  [ ]significant weight loss [ ]poor nutritional intake [ ]anasarca[ ]Artificial Nutrition    Other REFERRALS:  [ ]Hospice  [ ]Child Life  [ ]Social Work  [ ]Case management [ ]Holistic Therapy

## 2024-06-07 NOTE — PROGRESS NOTE ADULT - TIME BILLING
care coordination, plan of care discussed with patient and wife face to face at bedside, 2 surg IDR team
care coordination, plan of care dsicussed with patient face to face, 2 surg IDR team, wife at bedside, goc as noted in separate note, pt and wife in agreement

## 2024-06-07 NOTE — PROGRESS NOTE ADULT - ASSESSMENT
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 Pt was BIBEMS to  ED due to lethargy and hypotension tonight after taking temazepam, Prozac, flomax and Coreg. Pt is admitted for:    #Lethargy  #Syncope  #hypotension  #elevated Trop  #History of Combined Systolic and Diastolic HF with EF 25-30% (Echo 4/2024)  #History of CAD/Valvular Disease   -Trop 105.4 > 97.3  -TTE 4/2024: EF 25-30%, grade1 diastolic dysfunction, Mild-moderate MR, Moderate TR  -CXR neg  -CT head neg  -hold ARNI, mineralocorticoid, SGLT2 due to renal function  -hold bumex, coreg due to soft BP  -s/p midodrine 10mg tid  -c/w midodrine 10mg tid  -c/w aspirin, plavix  -c/w statin  - Daily weight. Strict in's and out's, telemetry  -remote Tele monitor  - TTE 6/6/24-  Biatrial enlargement, LV <20%, Moderate mitral valve regurgitation, Moderate-severe tricuspid regurgitation, Mild aortic regurgitation.  - Not on GDMT- SGLT2-i for HFpEF due to CKD with GFR 19   -cardio recommendations appreciated  - will be transferred to ICU for dobutamine drip    #Worsening jaundice 2/2 hepatic congestion  #Elevated T bili/Alk Phos/mild transaminitis  -He has no abdominal pain but  scleral icterus and jaundiced skin possibly due to hepatic congestion 2/2 HF  -Prior imaging showed RUQ US with gallstone; CTAP: Cholelithiasis with nonspecific gallbladder wall thickening.   -Bilirubin 3.4  -Monitor CMP  - ammonia level wnl    #BRODY on CKD stage III 2/2 cardio-renal syndrome  -Baseline Cr 1.5  -Cr  3.29 on admission  - Cr 3.38  -Avoid nephrotoxic agents  -hold off on GDMT such as ARNI, mineralocorticoid, SGLT2 due to renal function  -Monitor BMP  - nephro recommendations appreciated    #Anxiety/Depression/Bipolar Disorder  -Continue prozac  -hold mirtazapine due to hypotension    #BPH  -hold flomax qhs   -bladder scan q6h  -monitor urine output    #History of Lung Cancer  #HARRY Lung Nodule  -s/p RUL lobectomy (9/2022) with recurrent disease s/p radiation to left lung (completed 3/2023)  -Recent PET showed The LEFT lung nodule does appear increasingly rounded solid, and although the degree of uptake there is essentially unchanged. The RIGHT lung appears to have slowly become denser over time, with the appearance of uptake there are also appearing slightly more prominent.   -Follows with Dr. Alejandre for surveillance  - oncology recommendations appreciated    #DVT PPx  - heparin 5000subq q12    #GOC  - DNR/DNI  - palliative following case  - hospice eval and social work consult placed  - pt and wife are interested in learning about home hospice    Case was discussed with Dr. Saldana

## 2024-06-07 NOTE — CONSULT NOTE ADULT - PROBLEM SELECTOR RECOMMENDATION 2
With rising creatinine secondary to acute heart failure, appreciate renal follow up.  Monitor intake and outputs, electrolytes.  Avoid nephrotoxins, renally dose medications

## 2024-06-07 NOTE — PROGRESS NOTE ADULT - ASSESSMENT
79 year old male with history of combined systolic/diastolic HF with EF 25-30%(Apr/2024), CAD, CKD, BPH, lung CA s/p RUL lobectomy(Sep/2022), Rtx(Mar/2023), who was admitted on 6/6/24 for CHFE. Palliative medicine team was consulted to assist with GOC.    1. Patient denies any symptom burdens. Can use opioids for dyspnea if needed.     2. Advanced  Directives  - surrogate: Kenia(wife)  - code status: DNR/DNI, awaiting home hospice eval      3. Palliative medicine encounter   - pending home hospice arrangement. Please reach out to us if there is any further unmet palliative need.

## 2024-06-07 NOTE — CONSULT NOTE ADULT - CRITICAL CARE ATTENDING COMMENT
Patient seen and examined  d/w Cardio, wife and palliative care  patient known to me from out pateint.    Patient now with worsening Cardiac function leading to liver and renal dsysfunction  of note has new lung nodule that may be maligntant  further workup with imaging / biopsy may be required    Cardio requests  drip    Assessment  Cardio genic shock with multiorgan failure     not a candidate for RVAD / LVAD based on discussion with cardio    trial of  requested  h/o Lung cancer in non smoker with new lung nodules  Underlying BPH, Bipolar d/o, Depression, Anxiety, CAD (coronary artery disease)    Plan  Admit to ICU   drip  monitor bp/hr  monitor labs see if renal function and liver function improves  n/c o2  over all prognosis is poor  Palliative care following and home hospice is being considered  if not improve.   GOC DNR/I

## 2024-06-07 NOTE — CONSULT NOTE ADULT - ASSESSMENT
79 year old DNR/DNI male with PMH CAD, systolic HF with EF 25-30%, right heart failure, valvular disease, CKD, BPH, anxiety/depression/bipolar disorder, with history of lung cancer s/p RUL lobectomy  79 year old DNR/DNI male with PMH CAD, systolic HF with EF 25-30%, right heart failure, valvular disease, CKD, BPH, anxiety/depression/bipolar disorder, lung cancer presenting with acute decompensated heart failure

## 2024-06-08 LAB
ALBUMIN SERPL ELPH-MCNC: 2.7 G/DL — LOW (ref 3.3–5)
ALP SERPL-CCNC: 445 U/L — HIGH (ref 40–120)
ALT FLD-CCNC: 204 U/L — HIGH (ref 10–45)
ANION GAP SERPL CALC-SCNC: 13 MMOL/L — SIGNIFICANT CHANGE UP (ref 5–17)
ANION GAP SERPL CALC-SCNC: 14 MMOL/L — SIGNIFICANT CHANGE UP (ref 5–17)
AST SERPL-CCNC: 304 U/L — HIGH (ref 10–40)
BILIRUB SERPL-MCNC: 3.7 MG/DL — HIGH (ref 0.2–1.2)
BUN SERPL-MCNC: 107 MG/DL — HIGH (ref 7–23)
BUN SERPL-MCNC: 108 MG/DL — HIGH (ref 7–23)
CALCIUM SERPL-MCNC: 8 MG/DL — LOW (ref 8.4–10.5)
CALCIUM SERPL-MCNC: 8.1 MG/DL — LOW (ref 8.4–10.5)
CHLORIDE SERPL-SCNC: 96 MMOL/L — SIGNIFICANT CHANGE UP (ref 96–108)
CHLORIDE SERPL-SCNC: 97 MMOL/L — SIGNIFICANT CHANGE UP (ref 96–108)
CO2 SERPL-SCNC: 19 MMOL/L — LOW (ref 22–31)
CO2 SERPL-SCNC: 20 MMOL/L — LOW (ref 22–31)
CREAT SERPL-MCNC: 3.18 MG/DL — HIGH (ref 0.5–1.3)
CREAT SERPL-MCNC: 3.22 MG/DL — HIGH (ref 0.5–1.3)
EGFR: 19 ML/MIN/1.73M2 — LOW
EGFR: 19 ML/MIN/1.73M2 — LOW
GLUCOSE SERPL-MCNC: 104 MG/DL — HIGH (ref 70–99)
GLUCOSE SERPL-MCNC: 130 MG/DL — HIGH (ref 70–99)
HCT VFR BLD CALC: 32.4 % — LOW (ref 39–50)
HGB BLD-MCNC: 11.3 G/DL — LOW (ref 13–17)
MAGNESIUM SERPL-MCNC: 2.7 MG/DL — HIGH (ref 1.6–2.6)
MAGNESIUM SERPL-MCNC: 2.7 MG/DL — HIGH (ref 1.6–2.6)
MCHC RBC-ENTMCNC: 30.7 PG — SIGNIFICANT CHANGE UP (ref 27–34)
MCHC RBC-ENTMCNC: 34.9 GM/DL — SIGNIFICANT CHANGE UP (ref 32–36)
MCV RBC AUTO: 88 FL — SIGNIFICANT CHANGE UP (ref 80–100)
NRBC # BLD: 0 /100 WBCS — SIGNIFICANT CHANGE UP (ref 0–0)
NT-PROBNP SERPL-SCNC: 6057 PG/ML — HIGH (ref 0–300)
PHOSPHATE SERPL-MCNC: 5.1 MG/DL — HIGH (ref 2.5–4.5)
PLATELET # BLD AUTO: 115 K/UL — LOW (ref 150–400)
POTASSIUM SERPL-MCNC: 3.3 MMOL/L — LOW (ref 3.5–5.3)
POTASSIUM SERPL-MCNC: 3.6 MMOL/L — SIGNIFICANT CHANGE UP (ref 3.5–5.3)
POTASSIUM SERPL-SCNC: 3.3 MMOL/L — LOW (ref 3.5–5.3)
POTASSIUM SERPL-SCNC: 3.6 MMOL/L — SIGNIFICANT CHANGE UP (ref 3.5–5.3)
PROT SERPL-MCNC: 6.5 G/DL — SIGNIFICANT CHANGE UP (ref 6–8.3)
RBC # BLD: 3.68 M/UL — LOW (ref 4.2–5.8)
RBC # FLD: 16.1 % — HIGH (ref 10.3–14.5)
SODIUM SERPL-SCNC: 129 MMOL/L — LOW (ref 135–145)
SODIUM SERPL-SCNC: 130 MMOL/L — LOW (ref 135–145)
WBC # BLD: 5.76 K/UL — SIGNIFICANT CHANGE UP (ref 3.8–10.5)
WBC # FLD AUTO: 5.76 K/UL — SIGNIFICANT CHANGE UP (ref 3.8–10.5)

## 2024-06-08 PROCEDURE — 71045 X-RAY EXAM CHEST 1 VIEW: CPT | Mod: 26

## 2024-06-08 PROCEDURE — 99233 SBSQ HOSP IP/OBS HIGH 50: CPT

## 2024-06-08 RX ORDER — BUMETANIDE 0.25 MG/ML
1 INJECTION INTRAMUSCULAR; INTRAVENOUS
Refills: 0 | Status: DISCONTINUED | OUTPATIENT
Start: 2024-06-08 | End: 2024-06-08

## 2024-06-08 RX ORDER — POTASSIUM CHLORIDE 20 MEQ
40 PACKET (EA) ORAL ONCE
Refills: 0 | Status: COMPLETED | OUTPATIENT
Start: 2024-06-08 | End: 2024-06-08

## 2024-06-08 RX ORDER — POTASSIUM CHLORIDE 20 MEQ
10 PACKET (EA) ORAL
Refills: 0 | Status: COMPLETED | OUTPATIENT
Start: 2024-06-08 | End: 2024-06-08

## 2024-06-08 RX ORDER — BUMETANIDE 0.25 MG/ML
1 INJECTION INTRAMUSCULAR; INTRAVENOUS
Refills: 0 | Status: DISCONTINUED | OUTPATIENT
Start: 2024-06-08 | End: 2024-06-13

## 2024-06-08 RX ADMIN — ROSUVASTATIN CALCIUM 20 MILLIGRAM(S): 5 TABLET ORAL at 21:21

## 2024-06-08 RX ADMIN — Medication 20 MILLIGRAM(S): at 08:56

## 2024-06-08 RX ADMIN — Medication 81 MILLIGRAM(S): at 12:53

## 2024-06-08 RX ADMIN — Medication 650 MILLIGRAM(S): at 17:17

## 2024-06-08 RX ADMIN — HEPARIN SODIUM 5000 UNIT(S): 5000 INJECTION INTRAVENOUS; SUBCUTANEOUS at 05:25

## 2024-06-08 RX ADMIN — Medication 40 MILLIEQUIVALENT(S): at 12:55

## 2024-06-08 RX ADMIN — Medication 7.02 MICROGRAM(S)/KG/MIN: at 21:22

## 2024-06-08 RX ADMIN — PANTOPRAZOLE SODIUM 40 MILLIGRAM(S): 20 TABLET, DELAYED RELEASE ORAL at 05:25

## 2024-06-08 RX ADMIN — Medication 650 MILLIGRAM(S): at 21:21

## 2024-06-08 RX ADMIN — CLOPIDOGREL BISULFATE 75 MILLIGRAM(S): 75 TABLET, FILM COATED ORAL at 12:53

## 2024-06-08 RX ADMIN — BUMETANIDE 1 MILLIGRAM(S): 0.25 INJECTION INTRAMUSCULAR; INTRAVENOUS at 09:41

## 2024-06-08 RX ADMIN — Medication 650 MILLIGRAM(S): at 05:25

## 2024-06-08 RX ADMIN — CHLORHEXIDINE GLUCONATE 1 APPLICATION(S): 213 SOLUTION TOPICAL at 05:25

## 2024-06-08 RX ADMIN — TAMSULOSIN HYDROCHLORIDE 0.8 MILLIGRAM(S): 0.4 CAPSULE ORAL at 21:30

## 2024-06-08 RX ADMIN — HEPARIN SODIUM 5000 UNIT(S): 5000 INJECTION INTRAVENOUS; SUBCUTANEOUS at 17:20

## 2024-06-08 RX ADMIN — Medication 100 MILLIEQUIVALENT(S): at 09:41

## 2024-06-08 RX ADMIN — Medication 3 MILLIGRAM(S): at 21:22

## 2024-06-08 NOTE — PROGRESS NOTE ADULT - ASSESSMENT
PLAN:        on dubatime, cards following, monitor for urine output  -diursed with bumex, continue diuresis for goal net negative, follow lytes with diuresis  -patient is DNR/DNI  -AM labs  -DVT prophylaxis          TIME SPENT:  35 minutes of  time spent providing medical care for patient's acute illness/conditions that impairs at least one vital organ system and/or poses a high risk of imminent or life threatening deterioration in the patient's condition. It includes time spent evaluating and treating the patient's acute illness as well as time spent reviewing labs, radiology, discussing goals of care with patient and/or patient's family, and discussing the case with a multidisciplinary team, including the eICU, in an effort to prevent further life threatening deterioration or end organ damage. This time is independent of any procedures performed.    DATE OF ENTRY OF THIS NOTE IS EQUAL TO THE DATE OF SERVICES RENDERED

## 2024-06-08 NOTE — DIETITIAN INITIAL EVALUATION ADULT - NS FNS DIET ORDER
Diet, DASH/TLC:   Sodium & Cholesterol Restricted  1200mL Fluid Restriction ([id number]) (06-08-24 @ 09:04)

## 2024-06-08 NOTE — PROGRESS NOTE ADULT - SUBJECTIVE AND OBJECTIVE BOX
Follow-up Critical Care Progress Note  Chief Complaint : Acute renal failure    Awake and alert. States dyspneic with exertion    Allergies :No Known Allergies    PAST MEDICAL & SURGICAL HISTORY:  BPH (benign prostatic hyperplasia)    Bipolar disorder    Depression    Anxiety    Umbilical hernia, incarcerated    Constipation    Urinary retention    CAD (coronary artery disease)    SCC (squamous cell carcinoma)    Lung mass    CHF, chronic    Heart failure with reduced ejection fraction    History of arthroscopy of knee  Right, 1987    History of tonsillectomy  1952    History of hernia repair    H/O coronary angiogram    Medications:  MEDICATIONS  (STANDING):  aspirin enteric coated 81 milliGRAM(s) Oral daily  buMETAnide Injectable 1 milliGRAM(s) IV Push two times a day  chlorhexidine 2% Cloths 1 Application(s) Topical <User Schedule>  clopidogrel Tablet 75 milliGRAM(s) Oral daily  DOBUTamine Infusion 5 MICROgram(s)/kG/Min (7.02 mL/Hr) IV Continuous <Continuous>  FLUoxetine 20 milliGRAM(s) Oral <User Schedule>  heparin   Injectable 5000 Unit(s) SubCutaneous every 12 hours  pantoprazole    Tablet 40 milliGRAM(s) Oral before breakfast  potassium chloride  10 mEq/100 mL IVPB 10 milliEquivalent(s) IV Intermittent every 1 hour  rosuvastatin 20 milliGRAM(s) Oral at bedtime  sodium bicarbonate 650 milliGRAM(s) Oral three times a day  tamsulosin 0.8 milliGRAM(s) Oral at bedtime    MEDICATIONS  (PRN):  acetaminophen     Tablet .. 650 milliGRAM(s) Oral every 6 hours PRN Temp greater or equal to 38C (100.4F), Mild Pain (1 - 3)  aluminum hydroxide/magnesium hydroxide/simethicone Suspension 30 milliLiter(s) Oral every 4 hours PRN Dyspepsia  melatonin 3 milliGRAM(s) Oral at bedtime PRN Insomnia  ondansetron Injectable 4 milliGRAM(s) IV Push every 8 hours PRN Nausea and/or Vomiting    Antibiotics History    Heme Medications   aspirin enteric coated 81 milliGRAM(s) Oral daily, 06-06-24 @ 03:30  clopidogrel Tablet 75 milliGRAM(s) Oral daily, 06-06-24 @ 03:34  heparin   Injectable 5000 Unit(s) SubCutaneous every 12 hours, 06-07-24 @ 15:12    GI Medications  aluminum hydroxide/magnesium hydroxide/simethicone Suspension 30 milliLiter(s) Oral every 4 hours, 06-06-24 @ 02:58, Routine PRN  pantoprazole    Tablet 40 milliGRAM(s) Oral before breakfast, 06-07-24 @ 15:12, Routine    LABS:                        11.3   5.76  )-----------( 115      ( 08 Jun 2024 05:40 )             32.4     06-08    130<L>  |  97  |  108<H>  ----------------------------<  104<H>  3.3<L>   |  19<L>  |  3.18<H>    Ca    8.0<L>      08 Jun 2024 05:40  Phos  5.1     06-08  Mg     2.7     06-08    TPro  6.5  /  Alb  2.7<L>  /  TBili  3.7<H>  /  DBili  x   /  AST  304<H>  /  ALT  204<H>  /  AlkPhos  445<H>  06-08    ADRIA Negative 05-24 @ 13:55  Anti SS-1 --  Anti SS-2 --  Anti RNP --  RF -- 05-24 @ 13:55    Atypical ANCA -- 05-24 @ 13:55  c-ANCA titer -- 05-24 @ 13:55  c-ANCA -- 05-24 @ 13:55  p-ANCA -- 05-24 @ 13:55    Urinalysis Basic - ( 08 Jun 2024 05:40 )    Color: x / Appearance: x / SG: x / pH: x  Gluc: 104 mg/dL / Ketone: x  / Bili: x / Urobili: x   Blood: x / Protein: x / Nitrite: x   Leuk Esterase: x / RBC: x / WBC x   Sq Epi: x / Non Sq Epi: x / Bacteria: x    VITALS:  T(C): 36.4 (06-08-24 @ 05:00), Max: 36.4 (06-08-24 @ 01:00)  T(F): 97.5 (06-08-24 @ 05:00), Max: 97.5 (06-08-24 @ 01:00)  HR: 66 (06-08-24 @ 06:00) (57 - 70)  BP: 114/77 (06-08-24 @ 06:00) (94/64 - 117/75)  BP(mean): 87 (06-08-24 @ 06:00) (57 - 92)  ABP: --  ABP(mean): --  RR: 14 (06-08-24 @ 06:00) (11 - 20)  SpO2: 97% (06-08-24 @ 06:00) (94% - 100%)  CVP(mm Hg): --  CVP(cm H2O): --    Ins and Outs     06-07-24 @ 07:01  -  06-08-24 @ 07:00  --------------------------------------------------------  IN: 115.5 mL / OUT: 1535 mL / NET: -1419.5 mL    Height (cm): 182.9 (06-07-24 @ 12:30)  Weight (kg): 93.6 (06-07-24 @ 12:30)  BMI (kg/m2): 28 (06-07-24 @ 12:30)    I&O's Detail    07 Jun 2024 07:01  -  08 Jun 2024 07:00  --------------------------------------------------------  IN:    DOBUTamine: 10.5 mL    DOBUTamine: 105 mL  Total IN: 115.5 mL    OUT:    Indwelling Catheter - Urethral (mL): 1535 mL  Total OUT: 1535 mL    Total NET: -1419.5 mL    Physical Examination:  Gen:  WN/WD Male sitting in chair, not distressed  ENT:  NC/AT, no JVD noted  Thorax:  Symmetric, no retractions  Lung:  Scattered rales throughout  CV:  Heart sounds distant, S1, S2. RRR  Abd:  Soft, NT/ND.  BS normoactive, no masses to palp  Extrem:  Warm, pink.  Trace edema in LE's bilaterally to ankle  Neuro:  No gross motor/sensory deficits  Psych:  Awake, alert and calm

## 2024-06-08 NOTE — PROGRESS NOTE ADULT - SUBJECTIVE AND OBJECTIVE BOX
SUBJ:  Patient is a 79y old  Male who presents with a chief complaint of hypotension, elevated trop (07 Jun 2024 22:34)  The patient is seen and examined.  The chart is reviewed.  Case discussed with Dr. Oh and with ICU team.  Patient sitting in chair in ICU, appears comfortable at rest but reports getting dyspneic if he has to walk across the room.      PAST MEDICAL & SURGICAL HISTORY:  BPH (benign prostatic hyperplasia)      Bipolar disorder      Depression      Anxiety      Umbilical hernia, incarcerated      Constipation      Urinary retention      CAD (coronary artery disease)      SCC (squamous cell carcinoma)      Lung mass      CHF, chronic      Heart failure with reduced ejection fraction      History of arthroscopy of knee  Right, 1987      History of tonsillectomy  1952      History of hernia repair      H/O coronary angiogram          MEDICATIONS  (STANDING):  aspirin enteric coated 81 milliGRAM(s) Oral daily  buMETAnide Injectable 1 milliGRAM(s) IV Push two times a day  chlorhexidine 2% Cloths 1 Application(s) Topical <User Schedule>  clopidogrel Tablet 75 milliGRAM(s) Oral daily  DOBUTamine Infusion 5 MICROgram(s)/kG/Min (7.02 mL/Hr) IV Continuous <Continuous>  FLUoxetine 20 milliGRAM(s) Oral <User Schedule>  heparin   Injectable 5000 Unit(s) SubCutaneous every 12 hours  pantoprazole    Tablet 40 milliGRAM(s) Oral before breakfast  potassium chloride  10 mEq/100 mL IVPB 10 milliEquivalent(s) IV Intermittent every 1 hour  rosuvastatin 20 milliGRAM(s) Oral at bedtime  sodium bicarbonate 650 milliGRAM(s) Oral three times a day  tamsulosin 0.8 milliGRAM(s) Oral at bedtime    MEDICATIONS  (PRN):  acetaminophen     Tablet .. 650 milliGRAM(s) Oral every 6 hours PRN Temp greater or equal to 38C (100.4F), Mild Pain (1 - 3)  aluminum hydroxide/magnesium hydroxide/simethicone Suspension 30 milliLiter(s) Oral every 4 hours PRN Dyspepsia  melatonin 3 milliGRAM(s) Oral at bedtime PRN Insomnia  ondansetron Injectable 4 milliGRAM(s) IV Push every 8 hours PRN Nausea and/or Vomiting          Vital Signs Last 24 Hrs  T(C): 36.4 (08 Jun 2024 05:00), Max: 36.4 (08 Jun 2024 01:00)  T(F): 97.5 (08 Jun 2024 05:00), Max: 97.5 (08 Jun 2024 01:00)  HR: 66 (08 Jun 2024 06:00) (57 - 70)  BP: 114/77 (08 Jun 2024 06:00) (91/55 - 117/75)  BP(mean): 87 (08 Jun 2024 06:00) (57 - 92)  RR: 14 (08 Jun 2024 06:00) (11 - 20)  SpO2: 97% (08 Jun 2024 06:00) (94% - 100%)    Parameters below as of 08 Jun 2024 05:00  Patient On (Oxygen Delivery Method): nasal cannula  O2 Flow (L/min): 2      REVIEW OF SYSTEMS:  CONSTITUTIONAL: Weakness, fatigue.  RESPIRATORY: No cough, wheezing, chills or hemoptysis; No shortness of breath  CARDIOVASCULAR: Dyspnea on exertion, orthopnea, PND, lower extremity edema.  NEUROLOGICAL: No headaches, memory loss, loss of strength, numbness, or tremors      PHYSICAL EXAM  Constitutional:  Jaundiced appearing. No acute distress  HEENT: normocephalic, atraumatic.  PERRLA. EOMI  Neck : Elevated JVD.  Lungs:   Decreased breath sounds at both bases.  Heart:  S1 and S2  II/VI systolic murmur.  Abdomen:  soft, non tender.  Nuerologic:  A+O x 3. No focal deficits  Skin:  no rashes        LABS:                        11.3   5.76  )-----------( 115      ( 08 Jun 2024 05:40 )             32.4     06-08    130<L>  |  97  |  108<H>  ----------------------------<  104<H>  3.3<L>   |  19<L>  |  3.18<H>    Ca    8.0<L>      08 Jun 2024 05:40  Phos  5.1     06-08  Mg     2.7     06-08    TPro  6.5  /  Alb  2.7<L>  /  TBili  3.7<H>  /  DBili  x   /  AST  304<H>  /  ALT  204<H>  /  AlkPhos  445<H>  06-08    I&O's Summary    07 Jun 2024 07:01  -  08 Jun 2024 07:00  --------------------------------------------------------  IN: 115.5 mL / OUT: 1535 mL / NET: -1419.5 mL    < from: TTE Echo Complete w/o Contrast w/ Doppler (06.06.24 @ 08:29) >   1. Biatrial enlargement   2. Left ventricular ejection fraction, by visual estimation, is <20%.   3. Severely decreased global left ventricular systolic function.   4. Entire septum, entire apex, and mid anterior segment are abnormal as   described above.   5. Increased LV wall thickness.   6. Mildly increased left ventricular internal cavity size.   7. Right ventricular volume and pressure overload.   8. There is mild concentric left ventricular hypertrophy.   9. Severely enlarged right ventricle.  10. Severely reduced RV systolic function.  11. Moderate mitral valve regurgitation.  12. Moderate-severe tricuspid regurgitation.  13. Mild aortic regurgitation.  14. Mild pulmonic valve regurgitation.  15. Estimated pulmonary artery systolic pressure is 44.6 mmHg assuming a   right atrial pressure of 15 mmHg, which is consistent with mild pulmonary   hypertension.  16. LA volume Index is 38.5 ml/m² ml/m2.      < end of copied text >  < from: Xray Chest 1 View-PORTABLE IMMEDIATE (06.06.24 @ 01:32) >  MPRESSION: Again noted are bilateral lung and pleural findings with   heart enlargement. Lung findings are suspicious for malignancy.    < end of copied text >

## 2024-06-08 NOTE — PROGRESS NOTE ADULT - ASSESSMENT
79 year old DNR/DNI male with PMH CAD, systolic HF with EF 25-30%, right heart failure, valvular disease, CKD, BPH, anxiety/depression/bipolar disorder, lung cancer presenting with acute decompensated heart failure. ICU consulted for inotropic support for acute heart failure.     Assessment  1. Acute decompensation of heart failure     not a candidate for RVAD / LVAD based on discussion with cardio    trial of  requested  2. CKD   3. CAD  4. Lung cancer in non smoker with new lung nodules  Underlying BPH, Bipolar d/o, Depression, Anxiety, CAD (coronary artery disease)    Plan:  dobutamine drip  Close hemodynamic monitoring  diuresis and aim for negative fluid balance   Supplement potassium   monitor labs see if renal function and liver function improves  n/c o2  over all prognosis is poor  Cardiology and Nephrology follow up   Palliative care following and home hospice is being considered if not improve.   GOC DNR/I

## 2024-06-08 NOTE — DIETITIAN INITIAL EVALUATION ADULT - NSICDXPASTMEDICALHX_GEN_ALL_CORE_FT
PAST MEDICAL HISTORY:  Anxiety     Bipolar disorder     BPH (benign prostatic hyperplasia)     CAD (coronary artery disease)     CHF, chronic     Constipation     Depression     Heart failure with reduced ejection fraction     Lung mass     SCC (squamous cell carcinoma)     Umbilical hernia, incarcerated     Urinary retention

## 2024-06-08 NOTE — DIETITIAN INITIAL EVALUATION ADULT - ORAL INTAKE PTA/DIET HISTORY
Pt endorses consistently good appetite PTA. His wife prepares all meals at home low in salt. Typically consumes 3 meals/day. No oral nutrition supplements. Reports taking Vitamin D & and opthalmic vitamin. No chewing/swallowing issues. NKFA. Independent in feeding.  Pt endorses consistently good appetite PTA. His wife prepares all meals at home low in salt. Typically consumes 3 meals/day. No oral nutrition supplements. Reports taking Vitamin D & and opthalmic vitamin. No chewing/swallowing issues. NKFA. Independent in feeding. Pt reports typically following 1L fluid restriction at home.

## 2024-06-08 NOTE — PROGRESS NOTE ADULT - SUBJECTIVE AND OBJECTIVE BOX
NEPHROLOGY PROGRESS NOTE    CHIEF COMPLAINT:  BRODY/CKD    HPI:  Feels better since starting dobutamine yesterday.  UO 1.5 liters.    EXAM:  Vital Signs Last 24 Hrs  T(C): 36.4 (2024 05:00), Max: 36.4 (2024 01:00)  T(F): 97.5 (2024 05:00), Max: 97.5 (2024 01:00)  HR: 66 (2024 06:00) (57 - 70)  BP: 114/77 (2024 06:00) (91/55 - 117/75)  BP(mean): 87 (2024 06:00) (57 - 92)  RR: 14 (2024 06:00) (11 - 20)  SpO2: 97% (2024 06:00) (94% - 100%)    Parameters below as of 2024 05:00  Patient On (Oxygen Delivery Method): nasal cannula  O2 Flow (L/min): 2    I&O's Summary    2024 07:01  -  2024 07:00  --------------------------------------------------------  IN: 115.5 mL / OUT: 1535 mL / NET: -1419.5 mL      Daily Height in cm: 182.88 (2024 12:30)    Daily Weight in k.6 (2024 05:00)    Conversant, in no apparent distress  Normal respiratory effort, lungs clear bilaterally  Heart RRR with no murmur, no peripheral edema    LABS                        11.3   5.76  )-----------( 115      ( 2024 05:40 )             32.4     06-08    130<L>  |  97  |  108<H>  ----------------------------<  104<H>  3.3<L>   |  19<L>  |  3.18<H>    Ca    8.0<L>      2024 05:40  Phos  5.1     06-08  Mg     2.7     -08    TPro  6.5  /  Alb  2.7<L>  /  TBili  3.7<H>  /  DBili  x   /  AST  304<H>  /  ALT  204<H>  /  AlkPhos  445<H>        A/P:    CM, EF < 20%, lung ca, mod MR, mod-severe TR  Cardio-renal BRODY/CKD 3 (Cr 2.14 - 24)   Hypervolemic hyponatremia iso BRODY/CKD, CHF, malignancy   Resp distress iso CM, lung ca, improved on dobtuamine  Overall options are limited and prognosis is guarded  For pt and family most important goal is his comfort  No objections for diuretics to help w/symptoms if BP can tolerate

## 2024-06-08 NOTE — PROGRESS NOTE ADULT - ASSESSMENT
79-year-old man with admitted for acute exacerbation of chronic systolic and diastolic heart failure.  Patient transferred to ICU yesterday for dobutamine infusion.  Complex medical issues include lung cancer with recurrent disease, abnormal LFTs, worsening chronic kidney disease, anxiety/depression/bipolar disorder.  Due to hypotension and worsening renal function, he has not been able to get guideline directed medical therapy.  Case was discussed previously with heart failure team at [place] and  patient is not a candidate for advanced therapies.  Heart failure is end-stage, and he is now on a dobutamine drip for comfort.    Recommendations  -Continue dobutamine infusion.  -Diuresis as needed.  -Patient is DNR status, home hospice to be arranged.  -Discussed with patient and with ICU team

## 2024-06-08 NOTE — DIETITIAN INITIAL EVALUATION ADULT - PERTINENT LABORATORY DATA
06-08    130<L>  |  97  |  108<H>  ----------------------------<  104<H>  3.3<L>   |  19<L>  |  3.18<H>    Ca    8.0<L>      08 Jun 2024 05:40  Phos  5.1     06-08  Mg     2.7     06-08    TPro  6.5  /  Alb  2.7<L>  /  TBili  3.7<H>  /  DBili  x   /  AST  304<H>  /  ALT  204<H>  /  AlkPhos  445<H>  06-08

## 2024-06-08 NOTE — PROGRESS NOTE ADULT - SUBJECTIVE AND OBJECTIVE BOX
F/U Note:    79y Male admitted with CHF exacerbation, started on doubatime    Interval Hx  -diuresed today    Vital Signs Last 24 Hrs  T(C): 36.3 (08 Jun 2024 18:00), Max: 36.4 (08 Jun 2024 01:00)  T(F): 97.3 (08 Jun 2024 18:00), Max: 97.5 (08 Jun 2024 01:00)  HR: 71 (08 Jun 2024 19:00) (57 - 71)  BP: 106/64 (08 Jun 2024 19:00) (90/64 - 114/77)  BP(mean): 77 (08 Jun 2024 19:00) (57 - 106)  RR: 16 (08 Jun 2024 19:00) (11 - 21)  SpO2: 99% (08 Jun 2024 19:00) (94% - 100%)    Parameters below as of 08 Jun 2024 05:00  Patient On (Oxygen Delivery Method): nasal cannula  O2 Flow (L/min): 2                              11.3   5.76  )-----------( 115      ( 08 Jun 2024 05:40 )             32.4         06-08    129<L>  |  96  |  107<H>  ----------------------------<  130<H>  3.6   |  20<L>  |  3.22<H>    Ca    8.1<L>      08 Jun 2024 17:14  Phos  5.1     06-08  Mg     2.7     06-08    TPro  6.5  /  Alb  2.7<L>  /  TBili  3.7<H>  /  DBili  x   /  AST  304<H>  /  ALT  204<H>  /  AlkPhos  445<H>  06-08        NEURO: no headaches, blurry vision, tremors, depression, anxity  CV: no chest pain, palpitations, murmurs, orthopnea  Resp: no shortness of breath, cough, wheeze, sputum production  GI: no stomach pain,nausea, vomitting, flatulence, hematemesis, hematochezia  PV: no swelling of extremities, no hair loss, no coolness to extremities  ENDO: no polydypsia, polyphagia, polyuria, weight loss, night sweats          NEURO: awake and alert  CV: (+) S1/S2, rrr, no mrg  RESP: CTA b/l  GI: soft, non tender

## 2024-06-08 NOTE — DIETITIAN INITIAL EVALUATION ADULT - OTHER INFO
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 Pt was BIBEMS to  ED due to lethargy and hypotension tonight after taking temazepam, Prozac, flomax and Coreg. Pt is admitted for lethargy.   Pt seen this am sitting upright in chair & eating bfast. He reports tolerating meals with good appetite. UBW 187lbs per pt, .5lbs +2 edema bilateral legs likely contributing to weight fluctuation. NFPE with evidence of mild/moderate muscle wasting/fat loss despite report of good po intake. Pt denies N/V/D, constipation. Last BM today. Noted with hypokalemia, hyponatremia. Palliative following- noted plan for home hospice, prognosis guarded.

## 2024-06-08 NOTE — DIETITIAN INITIAL EVALUATION ADULT - PERTINENT MEDS FT
MEDICATIONS  (STANDING):  aspirin enteric coated 81 milliGRAM(s) Oral daily  buMETAnide Injectable 1 milliGRAM(s) IV Push two times a day  chlorhexidine 2% Cloths 1 Application(s) Topical <User Schedule>  clopidogrel Tablet 75 milliGRAM(s) Oral daily  DOBUTamine Infusion 5 MICROgram(s)/kG/Min (7.02 mL/Hr) IV Continuous <Continuous>  FLUoxetine 20 milliGRAM(s) Oral <User Schedule>  heparin   Injectable 5000 Unit(s) SubCutaneous every 12 hours  pantoprazole    Tablet 40 milliGRAM(s) Oral before breakfast  potassium chloride  10 mEq/100 mL IVPB 10 milliEquivalent(s) IV Intermittent every 1 hour  rosuvastatin 20 milliGRAM(s) Oral at bedtime  sodium bicarbonate 650 milliGRAM(s) Oral three times a day  tamsulosin 0.8 milliGRAM(s) Oral at bedtime    MEDICATIONS  (PRN):  acetaminophen     Tablet .. 650 milliGRAM(s) Oral every 6 hours PRN Temp greater or equal to 38C (100.4F), Mild Pain (1 - 3)  aluminum hydroxide/magnesium hydroxide/simethicone Suspension 30 milliLiter(s) Oral every 4 hours PRN Dyspepsia  melatonin 3 milliGRAM(s) Oral at bedtime PRN Insomnia  ondansetron Injectable 4 milliGRAM(s) IV Push every 8 hours PRN Nausea and/or Vomiting

## 2024-06-09 LAB
ANION GAP SERPL CALC-SCNC: 13 MMOL/L — SIGNIFICANT CHANGE UP (ref 5–17)
ANION GAP SERPL CALC-SCNC: 14 MMOL/L — SIGNIFICANT CHANGE UP (ref 5–17)
BUN SERPL-MCNC: 87 MG/DL — HIGH (ref 7–23)
BUN SERPL-MCNC: 97 MG/DL — HIGH (ref 7–23)
CALCIUM SERPL-MCNC: 8 MG/DL — LOW (ref 8.4–10.5)
CALCIUM SERPL-MCNC: 8.3 MG/DL — LOW (ref 8.4–10.5)
CHLORIDE SERPL-SCNC: 95 MMOL/L — LOW (ref 96–108)
CHLORIDE SERPL-SCNC: 96 MMOL/L — SIGNIFICANT CHANGE UP (ref 96–108)
CO2 SERPL-SCNC: 21 MMOL/L — LOW (ref 22–31)
CO2 SERPL-SCNC: 23 MMOL/L — SIGNIFICANT CHANGE UP (ref 22–31)
CREAT SERPL-MCNC: 2.86 MG/DL — HIGH (ref 0.5–1.3)
CREAT SERPL-MCNC: 2.95 MG/DL — HIGH (ref 0.5–1.3)
EGFR: 21 ML/MIN/1.73M2 — LOW
EGFR: 22 ML/MIN/1.73M2 — LOW
GLUCOSE SERPL-MCNC: 100 MG/DL — HIGH (ref 70–99)
GLUCOSE SERPL-MCNC: 139 MG/DL — HIGH (ref 70–99)
HCT VFR BLD CALC: 32.8 % — LOW (ref 39–50)
HGB BLD-MCNC: 11.3 G/DL — LOW (ref 13–17)
MAGNESIUM SERPL-MCNC: 2.6 MG/DL — SIGNIFICANT CHANGE UP (ref 1.6–2.6)
MCHC RBC-ENTMCNC: 30.4 PG — SIGNIFICANT CHANGE UP (ref 27–34)
MCHC RBC-ENTMCNC: 34.5 GM/DL — SIGNIFICANT CHANGE UP (ref 32–36)
MCV RBC AUTO: 88.2 FL — SIGNIFICANT CHANGE UP (ref 80–100)
NRBC # BLD: 0 /100 WBCS — SIGNIFICANT CHANGE UP (ref 0–0)
PHOSPHATE SERPL-MCNC: 4.6 MG/DL — HIGH (ref 2.5–4.5)
PLATELET # BLD AUTO: 104 K/UL — LOW (ref 150–400)
POTASSIUM SERPL-MCNC: 3.1 MMOL/L — LOW (ref 3.5–5.3)
POTASSIUM SERPL-MCNC: 3.8 MMOL/L — SIGNIFICANT CHANGE UP (ref 3.5–5.3)
POTASSIUM SERPL-SCNC: 3.1 MMOL/L — LOW (ref 3.5–5.3)
POTASSIUM SERPL-SCNC: 3.8 MMOL/L — SIGNIFICANT CHANGE UP (ref 3.5–5.3)
RBC # BLD: 3.72 M/UL — LOW (ref 4.2–5.8)
RBC # FLD: 16.2 % — HIGH (ref 10.3–14.5)
SODIUM SERPL-SCNC: 131 MMOL/L — LOW (ref 135–145)
SODIUM SERPL-SCNC: 131 MMOL/L — LOW (ref 135–145)
WBC # BLD: 5.28 K/UL — SIGNIFICANT CHANGE UP (ref 3.8–10.5)
WBC # FLD AUTO: 5.28 K/UL — SIGNIFICANT CHANGE UP (ref 3.8–10.5)

## 2024-06-09 PROCEDURE — 99232 SBSQ HOSP IP/OBS MODERATE 35: CPT

## 2024-06-09 RX ORDER — LANOLIN ALCOHOL/MO/W.PET/CERES
3 CREAM (GRAM) TOPICAL ONCE
Refills: 0 | Status: COMPLETED | OUTPATIENT
Start: 2024-06-09 | End: 2024-06-09

## 2024-06-09 RX ORDER — POTASSIUM CHLORIDE 20 MEQ
40 PACKET (EA) ORAL ONCE
Refills: 0 | Status: COMPLETED | OUTPATIENT
Start: 2024-06-09 | End: 2024-06-09

## 2024-06-09 RX ADMIN — Medication 3 MILLIGRAM(S): at 23:59

## 2024-06-09 RX ADMIN — Medication 3 MILLIGRAM(S): at 23:24

## 2024-06-09 RX ADMIN — Medication 650 MILLIGRAM(S): at 15:10

## 2024-06-09 RX ADMIN — HEPARIN SODIUM 5000 UNIT(S): 5000 INJECTION INTRAVENOUS; SUBCUTANEOUS at 17:27

## 2024-06-09 RX ADMIN — BUMETANIDE 1 MILLIGRAM(S): 0.25 INJECTION INTRAMUSCULAR; INTRAVENOUS at 15:54

## 2024-06-09 RX ADMIN — BUMETANIDE 1 MILLIGRAM(S): 0.25 INJECTION INTRAMUSCULAR; INTRAVENOUS at 05:07

## 2024-06-09 RX ADMIN — Medication 40 MILLIEQUIVALENT(S): at 12:32

## 2024-06-09 RX ADMIN — PANTOPRAZOLE SODIUM 40 MILLIGRAM(S): 20 TABLET, DELAYED RELEASE ORAL at 05:07

## 2024-06-09 RX ADMIN — Medication 650 MILLIGRAM(S): at 21:30

## 2024-06-09 RX ADMIN — Medication 40 MILLIEQUIVALENT(S): at 08:49

## 2024-06-09 RX ADMIN — HEPARIN SODIUM 5000 UNIT(S): 5000 INJECTION INTRAVENOUS; SUBCUTANEOUS at 05:07

## 2024-06-09 RX ADMIN — Medication 3.51 MICROGRAM(S)/KG/MIN: at 17:27

## 2024-06-09 RX ADMIN — ROSUVASTATIN CALCIUM 20 MILLIGRAM(S): 5 TABLET ORAL at 21:30

## 2024-06-09 RX ADMIN — CLOPIDOGREL BISULFATE 75 MILLIGRAM(S): 75 TABLET, FILM COATED ORAL at 12:32

## 2024-06-09 RX ADMIN — CHLORHEXIDINE GLUCONATE 1 APPLICATION(S): 213 SOLUTION TOPICAL at 05:08

## 2024-06-09 RX ADMIN — TAMSULOSIN HYDROCHLORIDE 0.8 MILLIGRAM(S): 0.4 CAPSULE ORAL at 21:30

## 2024-06-09 RX ADMIN — Medication 81 MILLIGRAM(S): at 12:32

## 2024-06-09 RX ADMIN — Medication 650 MILLIGRAM(S): at 05:07

## 2024-06-09 NOTE — PROGRESS NOTE ADULT - SUBJECTIVE AND OBJECTIVE BOX
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 Pt was BIBEMS to  ED due to lethargy and hypotension tonight after taking temazepam, Prozac, flomax and Coreg. Of note, Pt had syncope event on  from a sitting position he fell forward and hit his chin and Lt arm, which are bruised. Pt accompanied by wife, who contributed most of the history, she state Pt has been slower and had decreased appetite in the last week after falling. Pt saw Dr. Walden today and Coreg was reduced by half but Pt has not started on regimen. Denies any further falls or head trauma. Denies fever, chills, chest pain, abdominal pain, vomiting, diarrhea or dysuria.     In the ED, VS T 97.1F, HR 59, BP 86/58, RR 18, SpO2 98% on NC. As per ED provider, Pt was lethargic with pinpoint pupils, given Narcan and he woke up and became more alert. Labs pertinent for Na 131, Cr 3.29, , bilirubin 3.4, Trop 105.4, Alk phos 444, , , Mg 2.8, Phosp 6.3, and BNP 5254. CT Head neg, CXR no effusion/PNA. Pt was given midodrine 10mg with improvement of /72.     (2024 02:17)    Today:  pt  weaned to 2.5 mcg/     pt net neg on bumex q 12 hr      ============================I/O===========================   I&O's Detail    2024 07:01  -  2024 07:00  --------------------------------------------------------  IN:    DOBUTamine: 168 mL  Total IN: 168 mL    OUT:    Indwelling Catheter - Urethral (mL): 4250 mL  Total OUT: 4250 mL    Total NET: -4082 mL      2024 07:01  -  2024 20:11  --------------------------------------------------------  IN:    DOBUTamine: 7 mL    DOBUTamine: 25 mL  Total IN: 32 mL    OUT:    Indwelling Catheter - Urethral (mL): 550 mL    Voided (mL): 730 mL  Total OUT: 1280 mL    Total NET: -1248 mL        ============================ LABS =========================                        11.3   5.28  )-----------( 104      ( 2024 05:15 )             32.8     06-09    131<L>  |  95<L>  |  87<H>  ----------------------------<  139<H>  3.8   |  23  |  2.95<H>    Ca    8.0<L>      2024 15:20  Phos  4.6     06-  Mg     2.6     -    TPro  6.5  /  Alb  2.7<L>  /  TBili  3.7<H>  /  DBili  x   /  AST  304<H>  /  ALT  204<H>  /  AlkPhos  445<H>  08    LIVER FUNCTIONS - ( 2024 05:40 )  Alb: 2.7 g/dL / Pro: 6.5 g/dL / ALK PHOS: 445 U/L / ALT: 204 U/L / AST: 304 U/L / GGT: x               Urinalysis Basic - ( 2024 15:20 )    Color: x / Appearance: x / SG: x / pH: x  Gluc: 139 mg/dL / Ketone: x  / Bili: x / Urobili: x   Blood: x / Protein: x / Nitrite: x   Leuk Esterase: x / RBC: x / WBC x   Sq Epi: x / Non Sq Epi: x / Bacteria: x      ======================Micro/Rad/Cardio=================  Culture: Reviewed   CXR: Reviewed  Echo:Reviewed  ======================================================  PAST MEDICAL & SURGICAL HISTORY:  BPH (benign prostatic hyperplasia)      Bipolar disorder      Depression      Anxiety      Umbilical hernia, incarcerated      Constipation      Urinary retention      CAD (coronary artery disease)      SCC (squamous cell carcinoma)      Lung mass      CHF, chronic      Heart failure with reduced ejection fraction      History of arthroscopy of knee  Right,       History of tonsillectomy        History of hernia repair      H/O coronary angiogram      NEURO: awake and alert  CV: (+) S1/S2, rrr, no mrg  RESP: CTA b/l  GI: soft, non tender              ====================ASSESMENT ==============   79 year old DNR/DNI male with PMH CAD, systolic HF with EF 25-30%, right heart failure, valvular disease, CKD, BPH, anxiety/depression/bipolar disorder, lung cancer presenting with acute decompensated heart failure.    Plan:  ====================== NEUROLOGY=====================  acetaminophen     Tablet .. 650 milliGRAM(s) Oral every 6 hours PRN Temp greater or equal to 38C (100.4F), Mild Pain (1 - 3)  FLUoxetine 20 milliGRAM(s) Oral <User Schedule>  melatonin 3 milliGRAM(s) Oral at bedtime PRN Insomnia  ondansetron Injectable 4 milliGRAM(s) IV Push every 8 hours PRN Nausea and/or Vomiting    cont home dose of fluoxetine  neuro exam intact     ==================== RESPIRATORY======================       supp o2  aggressive chest pt     ====================CARDIOVASCULAR==================  buMETAnide Injectable 1 milliGRAM(s) IV Push two times a day  DOBUTamine Infusion 2.5 MICROgram(s)/kG/Min (3.51 mL/Hr) IV Continuous <Continuous>    Echo noted with severe lv dfx.  cont inotropic support   keep net neg daily with bumex   cardiology follow up  goal is net neg daily   should get daily weights     ===================HEMATOLOGIC/ONC ===================  aspirin enteric coated 81 milliGRAM(s) Oral daily  clopidogrel Tablet 75 milliGRAM(s) Oral daily  heparin   Injectable 5000 Unit(s) SubCutaneous every 12 hours    dapt for cardiac stents  cont statin    GI / DVT prophylaxis.   keep K>4, mag >2.0    ===================== RENAL =========================  Chandler for monitoring urine output  tamsulosin 0.8 milliGRAM(s) Oral at bedtime    monitor creatine, trending downward  keep k>4   mag >2  strict i/o    ==================== GASTROINTESTINAL===================  aluminum hydroxide/magnesium hydroxide/simethicone Suspension 30 milliLiter(s) Oral every 4 hours PRN Dyspepsia  pantoprazole    Tablet 40 milliGRAM(s) Oral before breakfast  sodium bicarbonate 650 milliGRAM(s) Oral three times a day    cont PPI   tolerating diet     =======================    ENDOCRINE  =====================  rosuvastatin 20 milliGRAM(s) Oral at bedtime    cont statin for known HLD  ========================INFECTIOUS DISEASE================  no abx at present       Patient requires continuous monitoring with bedside rhythm monitoring, pulse oximetry monitoring; and intermittent blood gas analysis.  Care plan discussed with ICU care team.  Patient remained critical and required more than usual post op care.   I have spent 35 minutes providing non-routine post op care with multiple re-evalutions throughout the evening.

## 2024-06-09 NOTE — PROGRESS NOTE ADULT - SUBJECTIVE AND OBJECTIVE BOX
Follow-up Critical Care Progress Note  Chief Complaint : Acute renal failure      Stable overnight, 4200 cc output ovenright no complaints this AM       Allergies :No Known Allergies      PAST MEDICAL & SURGICAL HISTORY:  BPH (benign prostatic hyperplasia)  Bipolar disorder  Depression  Anxiety  Umbilical hernia, incarcerated  Constipation  Urinary retention  CAD (coronary artery disease)  SCC (squamous cell carcinoma)  Lung mass  CHF, chronic  Heart failure with reduced ejection fraction  History of arthroscopy of knee Right, 1987  History of tonsillectomy 1952  History of hernia repair  H/O coronary angiogram        Medications:  MEDICATIONS  (STANDING):  aspirin enteric coated 81 milliGRAM(s) Oral daily  buMETAnide Injectable 1 milliGRAM(s) IV Push two times a day  chlorhexidine 2% Cloths 1 Application(s) Topical <User Schedule>  clopidogrel Tablet 75 milliGRAM(s) Oral daily  DOBUTamine Infusion 2.5 MICROgram(s)/kG/Min (3.51 mL/Hr) IV Continuous <Continuous>  FLUoxetine 20 milliGRAM(s) Oral <User Schedule>  heparin   Injectable 5000 Unit(s) SubCutaneous every 12 hours  pantoprazole    Tablet 40 milliGRAM(s) Oral before breakfast  potassium chloride    Tablet ER 40 milliEquivalent(s) Oral once  rosuvastatin 20 milliGRAM(s) Oral at bedtime  sodium bicarbonate 650 milliGRAM(s) Oral three times a day  tamsulosin 0.8 milliGRAM(s) Oral at bedtime    MEDICATIONS  (PRN):  acetaminophen     Tablet .. 650 milliGRAM(s) Oral every 6 hours PRN Temp greater or equal to 38C (100.4F), Mild Pain (1 - 3)  aluminum hydroxide/magnesium hydroxide/simethicone Suspension 30 milliLiter(s) Oral every 4 hours PRN Dyspepsia  melatonin 3 milliGRAM(s) Oral at bedtime PRN Insomnia  ondansetron Injectable 4 milliGRAM(s) IV Push every 8 hours PRN Nausea and/or Vomiting        Heme Medications   aspirin enteric coated 81 milliGRAM(s) Oral daily, 06-06-24 @ 03:30  clopidogrel Tablet 75 milliGRAM(s) Oral daily, 06-06-24 @ 03:34  heparin   Injectable 5000 Unit(s) SubCutaneous every 12 hours, 06-07-24 @ 15:12      GI Medications  aluminum hydroxide/magnesium hydroxide/simethicone Suspension 30 milliLiter(s) Oral every 4 hours, 06-06-24 @ 02:58, Routine PRN  pantoprazole    Tablet 40 milliGRAM(s) Oral before breakfast, 06-07-24 @ 15:12, Routine      COVID  05-26-24 @ 14:58  COVID -   NotDete  12-22-23 @ 21:20  COVID -   NotDetec  12-18-23 @ 15:55  COVID -   NotDete  02-15-23 @ 15:50  COVID -   NotDete  01-03-23 @ 17:32  COVID -   NotDete  08-22-22 @ 18:50  COVID -   NotDete  05-26-22 @ 06:36  COVID -   NotDete  05-19-22 @ 18:21  COVID -   NotDetec  05-11-22 @ 11:40  COVID -   NotDete      COVID Biomarkers    05-28-24 @ 19:40 ESR --  ---  CRP --  ---  DDimer  682<H>   ---   LDH --   ---   Ferritin --    05-28-24 @ 06:47 ESR --  ---  CRP --  ---  DDimer  --   ---   <H>   ---   Ferritin 93        Trend BNP  06-08-24 @ 05:40   -  6057<H>  06-06-24 @ 01:00   -  5254<H>  05-26-24 @ 14:25   -  5050<H>  05-22-24 @ 14:31   -  4697<H>  04-19-24 @ 18:15   -  6357<H>  04-10-24 @ 05:54   -  4956<H>      WBC Trend  06-09-24 @ 05:15   -  5.28  06-08-24 @ 05:40   -  5.76  06-07-24 @ 06:01   -  6.12    H/H Trend  06-09-24 @ 05:15   -   11.3<L>/ 32.8<L>  06-08-24 @ 05:40   -   11.3<L>/ 32.4<L>  06-07-24 @ 06:01   -   11.7<L>/ 35.6<L>  06-06-24 @ 01:00   -   11.6<L>/ 34.8<L>  05-30-24 @ 07:50   -   11.1<L>/ 34.1<L>  05-29-24 @ 06:54   -   11.4<L>/ 34.1<L>      Platelet Trend  06-09-24 @ 05:15   -  104<L>  06-08-24 @ 05:40   -  115<L>  06-07-24 @ 06:01   -  127<L>    Trend Sodium  06-09-24 @ 05:15   -  131<L>  06-08-24 @ 17:14   -  129<L>  06-08-24 @ 05:40   -  130<L>  06-07-24 @ 06:01   -  125<L>    Trend Potassium  06-09-24 @ 05:15   -  3.1<L>  06-08-24 @ 17:14   -  3.6  06-08-24 @ 05:40   -  3.3<L>  06-07-24 @ 06:01   -  4.6    Trend Bun/Cr  06-09-24 @ 05:15  BUN/CR -  97<H> / 2.86<H>  06-08-24 @ 17:14  BUN/CR -  107<H> / 3.22<H>  06-08-24 @ 05:40  BUN/CR -  108<H> / 3.18<H>  06-07-24 @ 06:01  BUN/CR -  120<H> / 3.38<H>    Lactic Acid Trend  06-06-24 @ 05:00   -   1.9  06-06-24 @ 01:00   -   2.7<H>    ABG Trend  06-06-24 @ 01:20   - 7.39/21<L>/117<H>/98.7<H>  05-28-24 @ 18:17   - 7.50<H>/23<L>/118<H>/98.3<H>  09-29-22 @ 04:33   - 7.46<H>/33<L>/77<L>/95.7  09-28-22 @ 20:02   - 7.51<H>/30<L>/103/98.7<H>  09-27-22 @ 04:20   - 7.49<H>/32<L>/136<H>/98.2<H>  09-25-22 @ 20:52   - 7.49<H>/31<L>/101/97.6  09-25-22 @ 04:45   - 7.45/35/85/96.3  09-24-22 @ 22:00   - 7.47<H>/33<L>/83/96.2  09-24-22 @ 16:40   - 7.51<H>/29<L>/83/95.8  09-24-22 @ 10:00   - 7.46<H>/30<L>/75<L>/94.1  09-24-22 @ 03:10   - 7.46<H>/29<L>/140<H>/97.9  09-23-22 @ 23:30   - 7.44/31<L>/97/98.0    Trend AST/ALT/ALK Phos/Bili  06-08-24 @ 05:40   304<H>/204<H>/445<H>/3.7<H>  06-07-24 @ 06:01   287<H>/192<H>/442<H>/4.1<H>  06-06-24 @ 08:32   177<H>/140<H>/415<H>/3.4<H>  06-06-24 @ 01:00   176<H>/134<H>/444<H>/3.4<H>  05-30-24 @ 07:50   117<H>/102<H>/422<H>/3.0<H>  05-29-24 @ 06:54   110<H>/88<H>/365<H>/3.1<H>  05-28-24 @ 06:47   115<H>/73<H>/372<H>/2.9<H>  05-27-24 @ 06:52   90<H>/63<H>/316<H>/3.0<H>  05-26-24 @ 14:25   76<H>/38/317<H>/3.4<H>  05-25-24 @ 06:07   42<H>/38/311<H>/2.3<H>  05-24-24 @ 06:22   44<H>/35/294<H>/2.0<H>  05-23-24 @ 06:37   46<H>/37/316<H>/1.9<H>      Ammonia Trend  06-06-24 @ 16:50   -   26      Amylase / Lipase Trend  05-23-24 @ 10:42   -   -- / 40  04-19-24 @ 14:45   -   -- / 39      Albumin Trend  06-08-24 @ 05:40   -   2.7<L>  06-07-24 @ 06:01   -   2.8<L>  06-06-24 @ 08:32   -   2.8<L>  06-06-24 @ 01:00   -   2.9<L>  05-30-24 @ 07:50   -   3.2<L>  05-29-24 @ 06:54   -   3.1<L>      PTT - PT - INR Trend  05-30-24 @ 07:50   -   31.7 - -- - --  05-29-24 @ 06:54   -   -- - 15.8<H> - 1.36<H>  04-19-24 @ 14:45   -   34.7 - 12.2 - 1.04  12-22-23 @ 16:35   -   29.7 - 12.7 - 1.12  01-09-23 @ 13:32   -   29.4 - 12.5 - 1.08  01-03-23 @ 17:32   -   29.5 - 15.6<H> - 1.34<H>    Glucose Trend  06-09-24 @ 05:15   -  100<H> -- --  06-08-24 @ 17:14   -  130<H> -- --  06-08-24 @ 05:40   -  104<H> -- --  06-07-24 @ 06:01   -  91 -- --        LABS:                        11.3   5.28  )-----------( 104      ( 09 Jun 2024 05:15 )             32.8     06-09    131<L>  |  96  |  97<H>  ----------------------------<  100<H>  3.1<L>   |  21<L>  |  2.86<H>    Ca    8.3<L>      09 Jun 2024 05:15  Phos  4.6     06-09  Mg     2.6     06-09    TPro  6.5  /  Alb  2.7<L>  /  TBili  3.7<H>  /  DBili  x   /  AST  304<H>  /  ALT  204<H>  /  AlkPhos  445<H>  06-08    ADRIA Negative 05-24 @ 13:55  Anti SS-1 --  Anti SS-2 --  Anti RNP --  RF -- 05-24 @ 13:55    Atypical ANCA -- 05-24 @ 13:55  c-ANCA titer -- 05-24 @ 13:55  c-ANCA -- 05-24 @ 13:55  p-ANCA -- 05-24 @ 13:55        Urinalysis Basic - ( 09 Jun 2024 05:15 )    Color: x / Appearance: x / SG: x / pH: x  Gluc: 100 mg/dL / Ketone: x  / Bili: x / Urobili: x   Blood: x / Protein: x / Nitrite: x   Leuk Esterase: x / RBC: x / WBC x   Sq Epi: x / Non Sq Epi: x / Bacteria: x        RADIOLOGY  CT:< from: CT Head No Cont (06.06.24 @ 03:08) >    IMPRESSION:    No acute intracranial hemorrhage, mass effect, or acute osseous fracture.    Dense calcifications in the left basal ganglia unchanged compared to   prior PET CTs.    Left middleear and mastoid effusion. Correlate with clinical exam.    < end of copied text >      ECHO: < from: TTE Echo Complete w/o Contrast w/ Doppler (06.06.24 @ 08:29) >      Summary:   1. Biatrial enlargement   2. Left ventricular ejection fraction, by visual estimation, is <20%.   3. Severely decreased global left ventricular systolic function.   4. Entire septum, entire apex, and mid anterior segment are abnormal as   described above.   5. Increased LV wall thickness.   6. Mildly increased left ventricular internal cavity size.   7. Right ventricular volume and pressure overload.   8. There is mild concentric left ventricular hypertrophy.   9. Severely enlarged right ventricle.  10. Severely reduced RV systolic function.  11. Moderate mitral valve regurgitation.  12. Moderate-severe tricuspid regurgitation.  13. Mild aortic regurgitation.  14. Mild pulmonic valve regurgitation.  15. Estimated pulmonary artery systolic pressure is 44.6 mmHg assuming a   right atrial pressure of 15 mmHg, which is consistent with mild pulmonary   hypertension.  16. LA volume Index is 38.5 ml/m² ml/m2.    [id number] Rik Skmonika , Electronically signed on 6/6/2024   at 1:34:30 PM    < end of copied text >        VITALS:  T(C): 36.6 (06-09-24 @ 07:00), Max: 36.6 (06-09-24 @ 07:00)  T(F): 97.9 (06-09-24 @ 07:00), Max: 97.9 (06-09-24 @ 07:00)  HR: 72 (06-09-24 @ 09:00) (63 - 74)  BP: 117/96 (06-09-24 @ 09:00) (92/58 - 123/95)  BP(mean): 105 (06-09-24 @ 09:00) (70 - 105)  RR: 16 (06-09-24 @ 09:00) (10 - 23)  SpO2: 100% (06-09-24 @ 09:00) (95% - 100%)      Ins and Outs     06-08-24 @ 07:01  -  06-09-24 @ 07:00  --------------------------------------------------------  IN: 168 mL / OUT: 4250 mL / NET: -4082 mL    06-09-24 @ 07:01  -  06-09-24 @ 10:34  --------------------------------------------------------  IN: 9.5 mL / OUT: 550 mL / NET: -540.5 mL        Height (cm): 182.9 (06-07-24 @ 12:30)  Weight (kg): 93.6 (06-07-24 @ 12:30)  BMI (kg/m2): 28 (06-07-24 @ 12:30)      I&O's Detail    08 Jun 2024 07:01  -  09 Jun 2024 07:00  --------------------------------------------------------  IN:    DOBUTamine: 168 mL  Total IN: 168 mL    OUT:    Indwelling Catheter - Urethral (mL): 4250 mL  Total OUT: 4250 mL    Total NET: -4082 mL      09 Jun 2024 07:01  -  09 Jun 2024 10:34  --------------------------------------------------------  IN:    DOBUTamine: 7 mL    DOBUTamine: 2.5 mL  Total IN: 9.5 mL    OUT:    Indwelling Catheter - Urethral (mL): 550 mL  Total OUT: 550 mL    Total NET: -540.5 mL          Physical Examination:  GENERAL:               Alert, Oriented, No acute distress.    HEENT:                    Pupils equal, reactive to light.  Symmetric.  PULM:                     Bilateral air entry, Clear to auscultation bilaterally,   CVS:                         S1, S2,  No Murmur, trace lower edema   ABD:                        Soft, nondistended, nontender, normoactive bowel sounds,   EXT:                         No edema, nontender, No Cyanosis or Clubbing   Vascular:                Warm Extremities, Normal Capillary refill, Normal Distal Pulses  SKIN:                       Warm and well perfused, no rashes noted.   NEURO:                  Alert, oriented, interactive, nonfocal, follows commands  PSYC:                      Calm, + Insight.

## 2024-06-09 NOTE — PROGRESS NOTE ADULT - SUBJECTIVE AND OBJECTIVE BOX
NEPHROLOGY PROGRESS NOTE    CHIEF COMPLAINT:  BRODY/CKD    HPI:  Renal function with small improvement.  Diuresed > 4 liters.  He still feels less dyspneic on dobutamine.    EXAM:  Vital Signs Last 24 Hrs  T(C): 36.6 (2024 07:00), Max: 36.6 (2024 07:00)  T(F): 97.9 (2024 07:00), Max: 97.9 (2024 07:00)  HR: 70 (2024 08:00) (63 - 74)  BP: 97/57 (2024 08:00) (92/58 - 123/95)  BP(mean): 71 (2024 08:00) (70 - 106)  RR: 11 (2024 08:00) (10 - 23)  SpO2: 99% (2024 08:00) (95% - 100%)    Parameters below as of 2024 08:00  Patient On (Oxygen Delivery Method): nasal cannula  O2 Flow (L/min): 2    I&O's Summary    2024 07:01  -  2024 07:00  --------------------------------------------------------  IN: 168 mL / OUT: 4250 mL / NET: -4082 mL    2024 07:01  -  2024 08:25  --------------------------------------------------------  IN: 7 mL / OUT: 300 mL / NET: -293 mL      Daily     Daily Weight in k.4 (2024 05:00)    Conversant, in no apparent distress  Normal respiratory effort, lungs clear bilaterally  Heart RRR with no murmur, no peripheral edema    LABS                        11.3   5.28  )-----------( 104      ( 2024 05:15 )             32.8     06-09    131<L>  |  96  |  97<H>  ----------------------------<  100<H>  3.1<L>   |  21<L>  |  2.86<H>    Ca    8.3<L>      2024 05:15  Phos  4.6       Mg     2.6         TPro  6.5  /  Alb  2.7<L>  /  TBili  3.7<H>  /  DBili  x   /  AST  304<H>  /  ALT  204<H>  /  AlkPhos  445<H>          A/P:    CM, EF < 20%, lung ca, mod MR, mod-severe TR  Cardio-renal BRODY/CKD 3 (Cr 2.14 - 24)   Hypervolemic hyponatremia iso BRODY/CKD, CHF, malignancy   Resp distress iso CM, lung ca, improved on dobtuamine  May try to taper diuretics to oral regimen as he is now less diuretic resistant with inotrope assist

## 2024-06-09 NOTE — PROGRESS NOTE ADULT - ASSESSMENT
79-year-old man in ICU for dobutamine infusion.  He has acute exacerbation of chronic systolic and diastolic heart failure.  Medical issues include lung cancer with recurrent disease, abnormal LFTs, chronic kidney disease, anxiety/depression/bipolar disorder.  He has not been able to receive guideline directed medical therapy due to hypotension and worsening renal function.  Patient is not felt to be a candidate for advanced therapies.  Heart failure is end-stage, and is now on a dobutamine drip for comfort.  Patient is DNR status.  He was in negative fluid balance yesterday and had a good diuresis.    Recommendations  -Continue with dobutamine infusion.  -Diuresis as needed.  -As per patient wishes, home hospice to be arranged.  Discussed with patient and with ICU team.

## 2024-06-09 NOTE — PROGRESS NOTE ADULT - SUBJECTIVE AND OBJECTIVE BOX
SUBJ:  Patient is a 79y old  Male who presents with a chief complaint of hypotension, elevated trop (09 Jun 2024 10:33)  The patient is seen and examined.  The chart is reviewed.  Patient sitting up in bed in ICU, on dobutamine drip, eating breakfast.  Feels slightly better than yesterday.    PAST MEDICAL & SURGICAL HISTORY:  BPH (benign prostatic hyperplasia)      Bipolar disorder      Depression      Anxiety      Umbilical hernia, incarcerated      Constipation      Urinary retention      CAD (coronary artery disease)      SCC (squamous cell carcinoma)      Lung mass      CHF, chronic      Heart failure with reduced ejection fraction      History of arthroscopy of knee  Right, 1987      History of tonsillectomy  1952      History of hernia repair      H/O coronary angiogram          MEDICATIONS  (STANDING):  aspirin enteric coated 81 milliGRAM(s) Oral daily  buMETAnide Injectable 1 milliGRAM(s) IV Push two times a day  chlorhexidine 2% Cloths 1 Application(s) Topical <User Schedule>  clopidogrel Tablet 75 milliGRAM(s) Oral daily  DOBUTamine Infusion 2.5 MICROgram(s)/kG/Min (3.51 mL/Hr) IV Continuous <Continuous>  FLUoxetine 20 milliGRAM(s) Oral <User Schedule>  heparin   Injectable 5000 Unit(s) SubCutaneous every 12 hours  pantoprazole    Tablet 40 milliGRAM(s) Oral before breakfast  potassium chloride    Tablet ER 40 milliEquivalent(s) Oral once  rosuvastatin 20 milliGRAM(s) Oral at bedtime  sodium bicarbonate 650 milliGRAM(s) Oral three times a day  tamsulosin 0.8 milliGRAM(s) Oral at bedtime    MEDICATIONS  (PRN):  acetaminophen     Tablet .. 650 milliGRAM(s) Oral every 6 hours PRN Temp greater or equal to 38C (100.4F), Mild Pain (1 - 3)  aluminum hydroxide/magnesium hydroxide/simethicone Suspension 30 milliLiter(s) Oral every 4 hours PRN Dyspepsia  melatonin 3 milliGRAM(s) Oral at bedtime PRN Insomnia  ondansetron Injectable 4 milliGRAM(s) IV Push every 8 hours PRN Nausea and/or Vomiting          Vital Signs Last 24 Hrs  T(C): 36.6 (09 Jun 2024 07:00), Max: 36.6 (09 Jun 2024 07:00)  T(F): 97.9 (09 Jun 2024 07:00), Max: 97.9 (09 Jun 2024 07:00)  HR: 72 (09 Jun 2024 09:00) (63 - 74)  BP: 117/96 (09 Jun 2024 09:00) (92/58 - 123/95)  BP(mean): 105 (09 Jun 2024 09:00) (70 - 105)  RR: 16 (09 Jun 2024 09:00) (10 - 23)  SpO2: 100% (09 Jun 2024 09:00) (95% - 100%)    Parameters below as of 09 Jun 2024 09:00  Patient On (Oxygen Delivery Method): room air        REVIEW OF SYSTEMS:  CONSTITUTIONAL: Fatigue.  RESPIRATORY: No cough, wheezing, chills or hemoptysis; No shortness of breath  CARDIOVASCULAR: No chest pain or chest pressure.  No shortness of breath or dyspnea on exertion.  No palpitations, dizziness, light headedness, syncope or near syncope.  No edema, no orthopnea.   NEUROLOGICAL: No headaches, memory loss, loss of strength, numbness, or tremors      PHYSICAL EXAM  Constitutional:  WDWN. No acute distress  HEENT: normocephalic, atraumatic.  PERRLA. EOMI  Neck : Elevated JVP approximately 11 cm  Lungs:Decreased breath sounds at bases.  Heart:  S1 and S2. No S3, S4. I/VI systolic murmur.  Abdomen:  soft, non tender.  Extremities: No clubbing, cyanoisis or edema  Nuerologic:  A+O x 3. No focal deficits  Skin:  no rashes        LABS:                        11.3   5.28  )-----------( 104      ( 09 Jun 2024 05:15 )             32.8     06-09    131<L>  |  96  |  97<H>  ----------------------------<  100<H>  3.1<L>   |  21<L>  |  2.86<H>    Ca    8.3<L>      09 Jun 2024 05:15  Phos  4.6     06-09  Mg     2.6     06-09    TPro  6.5  /  Alb  2.7<L>  /  TBili  3.7<H>  /  DBili  x   /  AST  304<H>  /  ALT  204<H>  /  AlkPhos  445<H>  06-08    I&O's Summary    08 Jun 2024 07:01 - 09 Jun 2024 07:00  --------------------------------------------------------  IN: 168 mL / OUT: 4250 mL / NET: -4082 mL    09 Jun 2024 07:01  - 09 Jun 2024 11:09  --------------------------------------------------------  IN: 9.5 mL / OUT: 550 mL / NET: -540.5 mL    < from: TTE Echo Complete w/o Contrast w/ Doppler (06.06.24 @ 08:29) >  1. Biatrial enlargement   2. Left ventricular ejection fraction, by visual estimation, is <20%.   3. Severely decreased global left ventricular systolic function.   4. Entire septum, entire apex, and mid anterior segment are abnormal as   described above.   5. Increased LV wall thickness.   6. Mildly increased left ventricular internal cavity size.   7. Right ventricular volume and pressure overload.   8. There is mild concentric left ventricular hypertrophy.   9. Severely enlarged right ventricle.  10. Severely reduced RV systolic function.  11. Moderate mitral valve regurgitation.  12. Moderate-severe tricuspid regurgitation.  13. Mild aortic regurgitation.  14. Mild pulmonic valve regurgitation.  15. Estimated pulmonary artery systolic pressure is 44.6 mmHg assuming a   right atrial pressure of 15 mmHg, which is consistent with mild pulmonary   hypertension.  16. LA volume Index is 38.5 ml/m² ml/m2.    < end of copied text >  < from: 12 Lead ECG (06.06.24 @ 01:07) >  Diagnosis Line Sinus bradycardiawith 1st degree AV block  Left axis deviation  Right bundle branch block  Anteroseptal infarct (cited on or before 11-MAY-2022)    When compared with ECG of 26-MAY-2024 14:10,  premature ventricular complexes are no longer present    < end of copied text >

## 2024-06-09 NOTE — PROGRESS NOTE ADULT - ASSESSMENT
79 year old DNR/DNI male with PMH CAD, systolic HF with EF 25-30%, right heart failure, valvular disease, CKD, BPH, anxiety/depression/bipolar disorder, lung cancer presenting with acute decompensated heart failure. ICU consulted for inotropic support for acute heart failure.     Assessment  1. Acute decompensation of heart failure     not a candidate for RVAD / LVAD based on discussion with cardio    trial of  requested  2. CKD   3. CAD  4. Lung cancer in non smoker with new lung nodules  Underlying BPH, Bipolar d/o, Depression, Anxiety, CAD (coronary artery disease)    Plan:  Continue dobutamine drip, titrate down as tolerated   Close hemodynamic monitoring  Continue diuresis with 1mg bumex BID  and aim for negative fluid balance   monitor labs, replace electrolytes as needed  Renal function slightly improved today, continue to monitor   dc gerardo today  continue flomax  n/c o2 titrate to maintain Spo2 > 92%   over all prognosis is poor  Cardiology and Nephrology following   Palliative care following and home hospice is being considered  OOB as tolerated  Patient is DNR/ DNI      Patient seen and evalauted with Dr Brewster who agrees with above stated plan of care

## 2024-06-09 NOTE — PROGRESS NOTE ADULT - CRITICAL CARE ATTENDING COMMENT
79 year old DNR/DNI male with PMH CAD, systolic HF with EF 25-30%, right heart failure, valvular disease, CKD, BPH, anxiety/depression/bipolar disorder, lung cancer presenting with acute decompensated heart failure. ICU consulted for inotropic support for acute heart failure.     Assessment  1. Acute decompensation of heart failure     not a candidate for RVAD / LVAD based on discussion with cardio    trial of  requested  2. CKD   3. CAD  4. Lung cancer in non smoker with new lung nodules  Underlying BPH, Bipolar d/o, Depression, Anxiety, CAD (coronary artery disease)    Plan:  dobutamine drip - decrease dose   Close hemodynamic monitoring  diuresis and aim for negative fluid balance - cont. bumex 1 mg BID  Supplement potassium   monitor labs see if renal function and liver function improves  n/c o2  over all prognosis is poor  Cardiology and Nephrology follow up   Void trial  Palliative care following and home hospice is being considered if not improve.   GOC DNR/I

## 2024-06-10 LAB
ANION GAP SERPL CALC-SCNC: 15 MMOL/L — SIGNIFICANT CHANGE UP (ref 5–17)
BUN SERPL-MCNC: 76 MG/DL — HIGH (ref 7–23)
CALCIUM SERPL-MCNC: 8.2 MG/DL — LOW (ref 8.4–10.5)
CHLORIDE SERPL-SCNC: 97 MMOL/L — SIGNIFICANT CHANGE UP (ref 96–108)
CO2 SERPL-SCNC: 20 MMOL/L — LOW (ref 22–31)
CREAT SERPL-MCNC: 2.48 MG/DL — HIGH (ref 0.5–1.3)
EGFR: 26 ML/MIN/1.73M2 — LOW
GLUCOSE SERPL-MCNC: 89 MG/DL — SIGNIFICANT CHANGE UP (ref 70–99)
HCT VFR BLD CALC: 33.8 % — LOW (ref 39–50)
HGB BLD-MCNC: 11.7 G/DL — LOW (ref 13–17)
MAGNESIUM SERPL-MCNC: 2.1 MG/DL — SIGNIFICANT CHANGE UP (ref 1.6–2.6)
MCHC RBC-ENTMCNC: 30.5 PG — SIGNIFICANT CHANGE UP (ref 27–34)
MCHC RBC-ENTMCNC: 34.6 GM/DL — SIGNIFICANT CHANGE UP (ref 32–36)
MCV RBC AUTO: 88 FL — SIGNIFICANT CHANGE UP (ref 80–100)
NRBC # BLD: 0 /100 WBCS — SIGNIFICANT CHANGE UP (ref 0–0)
PHOSPHATE SERPL-MCNC: 3.4 MG/DL — SIGNIFICANT CHANGE UP (ref 2.5–4.5)
PLATELET # BLD AUTO: 95 K/UL — LOW (ref 150–400)
POTASSIUM SERPL-MCNC: 3.5 MMOL/L — SIGNIFICANT CHANGE UP (ref 3.5–5.3)
POTASSIUM SERPL-SCNC: 3.5 MMOL/L — SIGNIFICANT CHANGE UP (ref 3.5–5.3)
RBC # BLD: 3.84 M/UL — LOW (ref 4.2–5.8)
RBC # FLD: 16.8 % — HIGH (ref 10.3–14.5)
SODIUM SERPL-SCNC: 132 MMOL/L — LOW (ref 135–145)
WBC # BLD: 4.73 K/UL — SIGNIFICANT CHANGE UP (ref 3.8–10.5)
WBC # FLD AUTO: 4.73 K/UL — SIGNIFICANT CHANGE UP (ref 3.8–10.5)

## 2024-06-10 PROCEDURE — 99497 ADVNCD CARE PLAN 30 MIN: CPT

## 2024-06-10 PROCEDURE — 99232 SBSQ HOSP IP/OBS MODERATE 35: CPT

## 2024-06-10 RX ORDER — VANCOMYCIN HCL 1 G
1000 VIAL (EA) INTRAVENOUS ONCE
Refills: 0 | Status: COMPLETED | OUTPATIENT
Start: 2024-06-10 | End: 2024-06-10

## 2024-06-10 RX ORDER — POTASSIUM CHLORIDE 20 MEQ
40 PACKET (EA) ORAL ONCE
Refills: 0 | Status: COMPLETED | OUTPATIENT
Start: 2024-06-10 | End: 2024-06-10

## 2024-06-10 RX ORDER — TRAZODONE HCL 50 MG
25 TABLET ORAL AT BEDTIME
Refills: 0 | Status: DISCONTINUED | OUTPATIENT
Start: 2024-06-10 | End: 2024-06-11

## 2024-06-10 RX ADMIN — Medication 650 MILLIGRAM(S): at 15:05

## 2024-06-10 RX ADMIN — CLOPIDOGREL BISULFATE 75 MILLIGRAM(S): 75 TABLET, FILM COATED ORAL at 12:47

## 2024-06-10 RX ADMIN — HEPARIN SODIUM 5000 UNIT(S): 5000 INJECTION INTRAVENOUS; SUBCUTANEOUS at 05:41

## 2024-06-10 RX ADMIN — Medication 650 MILLIGRAM(S): at 21:35

## 2024-06-10 RX ADMIN — Medication 40 MILLIEQUIVALENT(S): at 12:47

## 2024-06-10 RX ADMIN — BUMETANIDE 1 MILLIGRAM(S): 0.25 INJECTION INTRAMUSCULAR; INTRAVENOUS at 05:41

## 2024-06-10 RX ADMIN — Medication 25 MILLIGRAM(S): at 21:35

## 2024-06-10 RX ADMIN — TAMSULOSIN HYDROCHLORIDE 0.8 MILLIGRAM(S): 0.4 CAPSULE ORAL at 21:35

## 2024-06-10 RX ADMIN — PANTOPRAZOLE SODIUM 40 MILLIGRAM(S): 20 TABLET, DELAYED RELEASE ORAL at 05:41

## 2024-06-10 RX ADMIN — HEPARIN SODIUM 5000 UNIT(S): 5000 INJECTION INTRAVENOUS; SUBCUTANEOUS at 18:00

## 2024-06-10 RX ADMIN — Medication 650 MILLIGRAM(S): at 05:41

## 2024-06-10 RX ADMIN — Medication 81 MILLIGRAM(S): at 12:47

## 2024-06-10 RX ADMIN — BUMETANIDE 1 MILLIGRAM(S): 0.25 INJECTION INTRAMUSCULAR; INTRAVENOUS at 15:05

## 2024-06-10 RX ADMIN — ROSUVASTATIN CALCIUM 20 MILLIGRAM(S): 5 TABLET ORAL at 21:35

## 2024-06-10 RX ADMIN — CHLORHEXIDINE GLUCONATE 1 APPLICATION(S): 213 SOLUTION TOPICAL at 05:41

## 2024-06-10 RX ADMIN — Medication 250 MILLIGRAM(S): at 12:37

## 2024-06-10 NOTE — PROGRESS NOTE ADULT - SUBJECTIVE AND OBJECTIVE BOX
Seen in ICU, comfortable on RA    Vital Signs Last 24 Hrs  T(C): 36.2 (06-10-24 @ 12:00), Max: 37 (06-10-24 @ 05:00)  T(F): 97.1 (06-10-24 @ 12:00), Max: 98.6 (06-10-24 @ 05:00)  HR: 79 (06-10-24 @ 18:00) (66 - 79)  BP: 100/67 (06-10-24 @ 17:00) (88/58 - 108/59)  BP(mean): 77 (06-10-24 @ 17:00) (68 - 86)  RR: 16 (06-10-24 @ 18:00) (8 - 22)  SpO2: 100% (06-10-24 @ 18:00) (96% - 100%)    I&O's Detail    2024 07:01  -  10 Alirio 2024 07:00  --------------------------------------------------------  IN:    DOBUTamine: 70.5 mL    OUT:    Indwelling Catheter - Urethral (mL): 550 mL    Voided (mL): 2810 mL  Total OUT: 3360 mL    10 Alirio 2024 07:01  -  10 Alirio 2024 21:46  --------------------------------------------------------  OUT:    Voided (mL): 450 mL  Total OUT: 450 mL    s1s2  b/l air entry  soft, NT  + edema                                11.7   4.73  )-----------( 95       ( 10 Alirio 2024 05:00 )             33.8     10 Alirio 2024 05:00    132    |  97     |  76     ----------------------------<  89     3.5     |  20     |  2.48     Ca    8.2        10 Alirio 2024 05:00  Phos  3.4       10 Alirio 2024 05:00  Mg     2.1       10 Alirio 2024 05:00    acetaminophen     Tablet .. 650 milliGRAM(s) Oral every 6 hours PRN  aluminum hydroxide/magnesium hydroxide/simethicone Suspension 30 milliLiter(s) Oral every 4 hours PRN  aspirin enteric coated 81 milliGRAM(s) Oral daily  buMETAnide Injectable 1 milliGRAM(s) IV Push two times a day  chlorhexidine 2% Cloths 1 Application(s) Topical <User Schedule>  clopidogrel Tablet 75 milliGRAM(s) Oral daily  FLUoxetine 20 milliGRAM(s) Oral <User Schedule>  heparin   Injectable 5000 Unit(s) SubCutaneous every 12 hours  melatonin 3 milliGRAM(s) Oral at bedtime PRN  ondansetron Injectable 4 milliGRAM(s) IV Push every 8 hours PRN  pantoprazole    Tablet 40 milliGRAM(s) Oral before breakfast  rosuvastatin 20 milliGRAM(s) Oral at bedtime  sodium bicarbonate 650 milliGRAM(s) Oral three times a day  tamsulosin 0.8 milliGRAM(s) Oral at bedtime  traZODone 25 milliGRAM(s) Oral at bedtime PRN    A/P:    CM, EF < 20%, lung ca, mod MR, mod-severe TR  Cardio-renal BRODY/CKD 3 (Cr 2.14 - 24)   Hypervolemic hyponatremia iso BRODY/CKD, CHF, malignancy   CT A/P w/o hydro 24  UA negative 24  Resp distress iso CM, lung ca  V/Q scan w/low prob for PE 24  Tx to ICU, s/p  qtt  Cr is improving   Continue Bumex per ICU team   Avoid nephrotoxins, no NSAID's  D/w family at bedside      [phone]

## 2024-06-10 NOTE — PROGRESS NOTE ADULT - CONVERSATION DETAILS
Spoke with the patient and his wife, Kenia, at the bedside. Patient is off dobutamine drip, feels much improved. Kenia states that they no longer want home hospice service, and would like to get treated in the hospital if he needs to her. Informed that the impact of dobutamine is temporary and his heart function is minimal the heart failure can return any Time and they voiced good understanding. And they are interested in visiting nurse service.
Spoke with Kenia at the bedside. Explained to her that dobutamine drip is a temporary measure to improve his condition to get him home on home hospice and she voiced good understanding. She is amenable to home hospice, and would like to learn more about the details of the service. Social work consult placed.

## 2024-06-10 NOTE — PROGRESS NOTE ADULT - ASSESSMENT
79M PMHx CAD, HFrEF (EF 25-30%), right-side heart failure, valvular disease, CKD, BPH, anxiety/depression/bipolar disorder, Lung CA admitted to MICU with:   Assesment:  1. Acute Decompensated Heart Failure  2. Cardiogenic Shock  3. BRODY  4. Transaminitis    Plan:  NEURO:   -Monitor mental status closely, avoid neurosuppresants.   -Serial neurologic assessments.     CV:   -Remains off inotropic support with Dobutamine Infusion. Normotensive. Maintain goal MAP >65. Low threshold to reinstitute to maintain goal MAP. Monitor end points of organ perfusion.   -Keep K~4 and Mg>2 for optimal arrhythmia suppression.    PULM: Satting well on __, will utilize supplemental O2 PRN to maintain goal SpO2 >88%. Incentive spirometry, Chest PT/Pulmonary toilet, HOB >30'. Albuterol PRN.     GI: Diet. Protonix QD.    RENAL: Renal function currently WNL. Trend lytes/Scr daily with BMP, Strict I's and O's, goal UOP 0.5 cc/kg/hr, renal dose meds and avoid nephrotoxins.    ENDO: Aggressive glycemic control to limit FS glucose to <180mg/dl. Keep Euthyroid.     ID: ABX use and/or discontinuation based on discussion with ID in conjunction with clinical features, culture data, and judicious procalcitonin monitoring.    HEME: Pharmacologic DVT Prophylaxis in addition to SCD's w/ __    GOC:   79M PMHx CAD, HFrEF (EF 25-30%), right-side heart failure, valvular disease, CKD, BPH, anxiety/depression/bipolar disorder, Lung CA admitted to MICU with:   Assesment:  1. Acute Decompensated Heart Failure  2. Cardiogenic Shock  3. BRODY  4. Transaminitis    Plan:  NEURO:   -Monitor mental status closely, avoid neurosuppresants.   -Serial neurologic assessments.     CV:   -Remains off inotropic support with Dobutamine Infusion. Normotensive. Maintain goal MAP >65. Low threshold to reinstitute to maintain goal MAP. Monitor end points of organ perfusion.   -Keep K~4 and Mg>2 for optimal arrhythmia suppression.  -ASA/Plavix/Crestor. Bumex BID - even to negative fluid balance based on hemodynamics, diurese as able.   -Official TTE with LVEF <20%, severely reduced RV systolic function, moderate to severe TR. Cards following, recs appreciated. Not to be a candidate for advanced therapies.     PULM:   -Satting well on RA, will utilize supplemental O2 PRN to maintain goal SpO2 >88%.   -Incentive spirometry, Chest PT/Pulmonary toilet, HOB >30'.    GI:   -DASH/TLC Diet.   -Protonix QD.  -Elevated transaminases likely congestive hepatopathy. Trend pulse liver function QD.      RENAL:   -Renal function currently with BRODY on CKD. Likely cardiorenal.   -trend lytes/Scr daily with BMP  -I's and O's, goal UOP 0.5 cc/kg/hr  -renal dose meds and avoid nephrotoxins     ENDO:   -Aggressive glycemic control to limit FS glucose to <180mg/dl.      ID:   -Afebrile. No concern for infectious process. Monitor off abx.     HEME:   -VTE ppx with SQH.    GOC: DNR/DNI. Palliative Care team following.

## 2024-06-10 NOTE — PROGRESS NOTE ADULT - SUBJECTIVE AND OBJECTIVE BOX
Patient is a 79y old  Male who presents with a chief complaint of hypotension, elevated trop (10 Alirio 2024 13:19)    BRIEF HOSPITAL COURSE:   79M PMHx CAD, HFrEF (EF 25-30%), right-side heart failure, valvular disease, CKD, BPH, anxiety/depression/bipolar disorder, Lung CA admitted with acute decompensated heart failure, cardiogenic shock requiring inotropic support.     Events last 24 hours:   -Dobutamine Infusion remains off inotropic support. Normotensive.   -    PAST MEDICAL & SURGICAL HISTORY:  BPH (benign prostatic hyperplasia)  Bipolar disorder  Depression  Anxiety  Umbilical hernia, incarcerated  Constipation  Urinary retention  CAD (coronary artery disease)  SCC (squamous cell carcinoma)  Lung mass  CHF, chronic  Heart failure with reduced ejection fraction  History of arthroscopy of knee  Right, 1987  History of tonsillectomy  1952  History of hernia repair  H/O coronary angiogram    Review of Systems:  CONSTITUTIONAL: No fever, chills, or fatigue  EYES: No eye pain, visual disturbances, or discharge  ENMT:  No difficulty hearing, tinnitus, vertigo; No sinus or throat pain  NECK: No pain or stiffness  RESPIRATORY: No cough, wheezing, chills or hemoptysis; No shortness of breath  CARDIOVASCULAR: No chest pain, palpitations, dizziness, or leg swelling  GASTROINTESTINAL: No abdominal or epigastric pain. No nausea, vomiting, or hematemesis; No diarrhea or constipation. No melena or hematochezia.  GENITOURINARY: No dysuria, frequency, hematuria, or incontinence  NEUROLOGICAL: No headaches, memory loss, loss of strength, numbness, or tremors  SKIN: No itching, burning, rashes, or lesions   MUSCULOSKELETAL: No joint pain or swelling; No muscle, back, or extremity pain  PSYCHIATRIC: No depression, anxiety, mood swings, or difficulty sleeping    Medications:  buMETAnide Injectable 1 milliGRAM(s) IV Push two times a day  acetaminophen     Tablet .. 650 milliGRAM(s) Oral every 6 hours PRN  FLUoxetine 20 milliGRAM(s) Oral <User Schedule>  melatonin 3 milliGRAM(s) Oral at bedtime PRN  ondansetron Injectable 4 milliGRAM(s) IV Push every 8 hours PRN  traZODone 25 milliGRAM(s) Oral at bedtime PRN  aspirin enteric coated 81 milliGRAM(s) Oral daily  clopidogrel Tablet 75 milliGRAM(s) Oral daily  heparin   Injectable 5000 Unit(s) SubCutaneous every 12 hours  aluminum hydroxide/magnesium hydroxide/simethicone Suspension 30 milliLiter(s) Oral every 4 hours PRN  pantoprazole    Tablet 40 milliGRAM(s) Oral before breakfast  tamsulosin 0.8 milliGRAM(s) Oral at bedtime  rosuvastatin 20 milliGRAM(s) Oral at bedtime  sodium bicarbonate 650 milliGRAM(s) Oral three times a day  chlorhexidine 2% Cloths 1 Application(s) Topical <User Schedule>    ICU Vital Signs Last 24 Hrs  T(C): 36.2 (10 Alirio 2024 12:00), Max: 37 (10 Alirio 2024 05:00)  T(F): 97.1 (10 Alirio 2024 12:00), Max: 98.6 (10 Alirio 2024 05:00)  HR: 79 (10 Alirio 2024 18:00) (66 - 79)  BP: 100/67 (10 Alirio 2024 17:00) (88/58 - 108/59)  BP(mean): 77 (10 Alirio 2024 17:00) (68 - 86)  ABP: --  ABP(mean): --  RR: 16 (10 Alirio 2024 18:00) (8 - 22)  SpO2: 100% (10 Alirio 2024 18:00) (96% - 100%)  O2 Parameters below as of 10 Alirio 2024 06:00  Patient On (Oxygen Delivery Method): room air    I&O's Detail  09 Jun 2024 07:01  -  10 Alirio 2024 07:00  --------------------------------------------------------  IN:    DOBUTamine: 7 mL    DOBUTamine: 63.5 mL  Total IN: 70.5 mL  OUT:    Indwelling Catheter - Urethral (mL): 550 mL    Voided (mL): 2810 mL  Total OUT: 3360 mL  Total NET: -3289.5 mL    10 Alirio 2024 07:01  -  10 Alirio 2024 20:19  --------------------------------------------------------  IN:  Total IN: 0 mL  OUT:    Voided (mL): 450 mL  Total OUT: 450 mL  Total NET: -450 mL    LABS:                      11.7   4.73  )-----------( 95       ( 10 Alirio 2024 05:00 )             33.8     06-10  132<L>  |  97  |  76<H>  ----------------------------<  89  3.5   |  20<L>  |  2.48<H>  Ca    8.2<L>      10 Alirio 2024 05:00  Phos  3.4     06-10  Mg     2.1     06-10    CAPILLARY BLOOD GLUCOSE    Urinalysis Basic - ( 10 Alirio 2024 05:00 )  Color: x / Appearance: x / SG: x / pH: x  Gluc: 89 mg/dL / Ketone: x  / Bili: x / Urobili: x   Blood: x / Protein: x / Nitrite: x   Leuk Esterase: x / RBC: x / WBC x   Sq Epi: x / Non Sq Epi: x / Bacteria: x    CULTURES:    Physical Examination:  General: Alert, oriented, interactive, nonfocal.  HEENT: PERRL.  NECK: Supple.   PULM: Clear to auscultation bilaterally.  CVS: s1/s2.  ABD: Soft, nondistended, nontender, normoactive bowel sounds.  EXT: No edema, nontender.  SKIN: Warm.    RADIOLOGY:   < from: CT Head No Cont (06.06.24 @ 03:08) >  ACC: [id number] EXAM:  CT BRAIN   ORDERED BY: CATHI MONTES   PROCEDURE DATE:  06/06/2024    IMPRESSION:  No acute intracranial hemorrhage, mass effect, or acute osseous fracture.  Dense calcifications in the left basal ganglia unchanged compared to   prior PET CTs.  Left middleear and mastoid effusion. Correlate with clinical exam.    Time spent on this patient encounter, which includes documenting this note in the electronic medical record, was +50 minutes including assessing the presenting problems with associated risks, reviewing the medical record to prepare for the encounter, and meeting face to face with the patient to obtain additional history. I have also performed an appropriate physical exam, made interventions listed and ordered and interpreted appropriate diagnostic studies as documented. To improve communication and patient safety, I have coordinated care with the multidisciplinary team including the bedside nurse, appropriate attending of record and consultants as needed. This time is independent of any procedures performed.    Date of entry of this note is equal to the date of services rendered.  Patient is a 79y old  Male who presents with a chief complaint of hypotension, elevated trop (10 Alirio 2024 13:19)    BRIEF HOSPITAL COURSE:   79M PMHx CAD, HFrEF (EF 25-30%), right-side heart failure, valvular disease, CKD, BPH, anxiety/depression/bipolar disorder, Lung CA admitted with acute decompensated heart failure, cardiogenic shock requiring inotropic support, BRODY, Transaminitis.     Events last 24 hours:   -Remains off inotropic support with Dobutamine Infusion. Normotensive.   -Satting well on RA. Afebrile.     PAST MEDICAL & SURGICAL HISTORY:  BPH (benign prostatic hyperplasia)  Bipolar disorder  Depression  Anxiety  Umbilical hernia, incarcerated  Constipation  Urinary retention  CAD (coronary artery disease)  SCC (squamous cell carcinoma)  Lung mass  CHF, chronic  Heart failure with reduced ejection fraction  History of arthroscopy of knee  Right, 1987  History of tonsillectomy  1952  History of hernia repair  H/O coronary angiogram    Review of Systems:  CONSTITUTIONAL: No fever, chills, or fatigue  EYES: No eye pain, visual disturbances, or discharge  ENMT:  No difficulty hearing, tinnitus, vertigo; No sinus or throat pain  NECK: No pain or stiffness  RESPIRATORY: No cough, wheezing, chills or hemoptysis; No shortness of breath  CARDIOVASCULAR: No chest pain, palpitations, dizziness, or leg swelling  GASTROINTESTINAL: No abdominal or epigastric pain. No nausea, vomiting, or hematemesis; No diarrhea or constipation. No melena or hematochezia.  GENITOURINARY: No dysuria, frequency, hematuria, or incontinence  NEUROLOGICAL: No headaches, memory loss, loss of strength, numbness, or tremors  SKIN: No itching, burning, rashes, or lesions   MUSCULOSKELETAL: No joint pain or swelling; No muscle, back, or extremity pain  PSYCHIATRIC: No depression, anxiety, mood swings, or difficulty sleeping    Medications:  buMETAnide Injectable 1 milliGRAM(s) IV Push two times a day  acetaminophen     Tablet .. 650 milliGRAM(s) Oral every 6 hours PRN  FLUoxetine 20 milliGRAM(s) Oral <User Schedule>  melatonin 3 milliGRAM(s) Oral at bedtime PRN  ondansetron Injectable 4 milliGRAM(s) IV Push every 8 hours PRN  traZODone 25 milliGRAM(s) Oral at bedtime PRN  aspirin enteric coated 81 milliGRAM(s) Oral daily  clopidogrel Tablet 75 milliGRAM(s) Oral daily  heparin   Injectable 5000 Unit(s) SubCutaneous every 12 hours  aluminum hydroxide/magnesium hydroxide/simethicone Suspension 30 milliLiter(s) Oral every 4 hours PRN  pantoprazole    Tablet 40 milliGRAM(s) Oral before breakfast  tamsulosin 0.8 milliGRAM(s) Oral at bedtime  rosuvastatin 20 milliGRAM(s) Oral at bedtime  sodium bicarbonate 650 milliGRAM(s) Oral three times a day  chlorhexidine 2% Cloths 1 Application(s) Topical <User Schedule>    ICU Vital Signs Last 24 Hrs  T(C): 36.2 (10 Alirio 2024 12:00), Max: 37 (10 Alirio 2024 05:00)  T(F): 97.1 (10 Alirio 2024 12:00), Max: 98.6 (10 Alirio 2024 05:00)  HR: 79 (10 Alirio 2024 18:00) (66 - 79)  BP: 100/67 (10 Alirio 2024 17:00) (88/58 - 108/59)  BP(mean): 77 (10 Alirio 2024 17:00) (68 - 86)  ABP: --  ABP(mean): --  RR: 16 (10 Alirio 2024 18:00) (8 - 22)  SpO2: 100% (10 Alirio 2024 18:00) (96% - 100%)  O2 Parameters below as of 10 Alirio 2024 06:00  Patient On (Oxygen Delivery Method): room air    I&O's Detail  09 Jun 2024 07:01  -  10 Alirio 2024 07:00  --------------------------------------------------------  IN:    DOBUTamine: 7 mL    DOBUTamine: 63.5 mL  Total IN: 70.5 mL  OUT:    Indwelling Catheter - Urethral (mL): 550 mL    Voided (mL): 2810 mL  Total OUT: 3360 mL  Total NET: -3289.5 mL    10 Alirio 2024 07:01  -  10 Alirio 2024 20:19  --------------------------------------------------------  IN:  Total IN: 0 mL  OUT:    Voided (mL): 450 mL  Total OUT: 450 mL  Total NET: -450 mL    LABS:                      11.7   4.73  )-----------( 95       ( 10 Alirio 2024 05:00 )             33.8     06-10  132<L>  |  97  |  76<H>  ----------------------------<  89  3.5   |  20<L>  |  2.48<H>  Ca    8.2<L>      10 Alirio 2024 05:00  Phos  3.4     06-10  Mg     2.1     06-10    CAPILLARY BLOOD GLUCOSE    Urinalysis Basic - ( 10 Alirio 2024 05:00 )  Color: x / Appearance: x / SG: x / pH: x  Gluc: 89 mg/dL / Ketone: x  / Bili: x / Urobili: x   Blood: x / Protein: x / Nitrite: x   Leuk Esterase: x / RBC: x / WBC x   Sq Epi: x / Non Sq Epi: x / Bacteria: x    CULTURES:    Physical Examination:  General: Alert, oriented, interactive, nonfocal.  HEENT: PERRL.  NECK: Supple.   PULM: Clear to auscultation bilaterally.  CVS: s1/s2.  ABD: Soft, nondistended, nontender, normoactive bowel sounds.  EXT: No edema, nontender.  SKIN: Warm.    RADIOLOGY:   < from: CT Head No Cont (06.06.24 @ 03:08) >  ACC: [id number] EXAM:  CT BRAIN   ORDERED BY: CATHI MONTES   PROCEDURE DATE:  06/06/2024    IMPRESSION:  No acute intracranial hemorrhage, mass effect, or acute osseous fracture.  Dense calcifications in the left basal ganglia unchanged compared to   prior PET CTs.  Left middleear and mastoid effusion. Correlate with clinical exam.    Time spent on this patient encounter, which includes documenting this note in the electronic medical record, was +35 minutes including assessing the presenting problems with associated risks, reviewing the medical record to prepare for the encounter, and meeting face to face with the patient to obtain additional history. I have also performed an appropriate physical exam, made interventions listed and ordered and interpreted appropriate diagnostic studies as documented. To improve communication and patient safety, I have coordinated care with the multidisciplinary team including the bedside nurse, appropriate attending of record and consultants as needed. This time is independent of any procedures performed.    Date of entry of this note is equal to the date of services rendered.  Patient is a 79y old  Male who presents with a chief complaint of hypotension, elevated trop (10 Alirio 2024 13:19)    BRIEF HOSPITAL COURSE:   79M PMHx CAD, HFrEF (EF 25-30%), right-side heart failure, valvular disease, CKD, BPH, anxiety/depression/bipolar disorder, Lung CA admitted with acute decompensated heart failure, cardiogenic shock requiring inotropic support, BRODY, Transaminitis.     Events last 24 hours:   -Remains off inotropic support with Dobutamine Infusion. Normotensive.   -Satting well on RA. Afebrile.     PAST MEDICAL & SURGICAL HISTORY:  BPH (benign prostatic hyperplasia)  Bipolar disorder  Depression  Anxiety  Umbilical hernia, incarcerated  Constipation  Urinary retention  CAD (coronary artery disease)  SCC (squamous cell carcinoma)  Lung mass  CHF, chronic  Heart failure with reduced ejection fraction  History of arthroscopy of knee  Right, 1987  History of tonsillectomy  1952  History of hernia repair  H/O coronary angiogram    Review of Systems:  CONSTITUTIONAL: No fever, chills, or fatigue  EYES: No eye pain, visual disturbances, or discharge  ENMT:  No difficulty hearing, tinnitus, vertigo; No sinus or throat pain  NECK: No pain or stiffness  RESPIRATORY: No cough, wheezing, chills or hemoptysis; No shortness of breath  CARDIOVASCULAR: No chest pain, palpitations, dizziness, or leg swelling  GASTROINTESTINAL: No abdominal or epigastric pain. No nausea, vomiting, or hematemesis; No diarrhea or constipation. No melena or hematochezia.  GENITOURINARY: No dysuria, frequency, hematuria, or incontinence  NEUROLOGICAL: No headaches, memory loss, loss of strength, numbness, or tremors  SKIN: No itching, burning, rashes, or lesions   MUSCULOSKELETAL: No joint pain or swelling; No muscle, back, or extremity pain  PSYCHIATRIC: No depression, anxiety, mood swings, or difficulty sleeping    Medications:  buMETAnide Injectable 1 milliGRAM(s) IV Push two times a day  acetaminophen     Tablet .. 650 milliGRAM(s) Oral every 6 hours PRN  FLUoxetine 20 milliGRAM(s) Oral <User Schedule>  melatonin 3 milliGRAM(s) Oral at bedtime PRN  ondansetron Injectable 4 milliGRAM(s) IV Push every 8 hours PRN  traZODone 25 milliGRAM(s) Oral at bedtime PRN  aspirin enteric coated 81 milliGRAM(s) Oral daily  clopidogrel Tablet 75 milliGRAM(s) Oral daily  heparin   Injectable 5000 Unit(s) SubCutaneous every 12 hours  aluminum hydroxide/magnesium hydroxide/simethicone Suspension 30 milliLiter(s) Oral every 4 hours PRN  pantoprazole    Tablet 40 milliGRAM(s) Oral before breakfast  tamsulosin 0.8 milliGRAM(s) Oral at bedtime  rosuvastatin 20 milliGRAM(s) Oral at bedtime  sodium bicarbonate 650 milliGRAM(s) Oral three times a day  chlorhexidine 2% Cloths 1 Application(s) Topical <User Schedule>    ICU Vital Signs Last 24 Hrs  T(C): 36.2 (10 Alirio 2024 12:00), Max: 37 (10 Alirio 2024 05:00)  T(F): 97.1 (10 Alirio 2024 12:00), Max: 98.6 (10 Alirio 2024 05:00)  HR: 79 (10 Alirio 2024 18:00) (66 - 79)  BP: 100/67 (10 Alirio 2024 17:00) (88/58 - 108/59)  BP(mean): 77 (10 Alirio 2024 17:00) (68 - 86)  ABP: --  ABP(mean): --  RR: 16 (10 Alirio 2024 18:00) (8 - 22)  SpO2: 100% (10 Alirio 2024 18:00) (96% - 100%)  O2 Parameters below as of 10 Alirio 2024 06:00  Patient On (Oxygen Delivery Method): room air    I&O's Detail  09 Jun 2024 07:01  -  10 Alirio 2024 07:00  --------------------------------------------------------  IN:    DOBUTamine: 7 mL    DOBUTamine: 63.5 mL  Total IN: 70.5 mL  OUT:    Indwelling Catheter - Urethral (mL): 550 mL    Voided (mL): 2810 mL  Total OUT: 3360 mL  Total NET: -3289.5 mL    10 Alirio 2024 07:01  -  10 Alirio 2024 20:19  --------------------------------------------------------  IN:  Total IN: 0 mL  OUT:    Voided (mL): 450 mL  Total OUT: 450 mL  Total NET: -450 mL    LABS:                      11.7   4.73  )-----------( 95       ( 10 Alirio 2024 05:00 )             33.8     06-10  132<L>  |  97  |  76<H>  ----------------------------<  89  3.5   |  20<L>  |  2.48<H>  Ca    8.2<L>      10 Alirio 2024 05:00  Phos  3.4     06-10  Mg     2.1     06-10    CAPILLARY BLOOD GLUCOSE    Urinalysis Basic - ( 10 Alirio 2024 05:00 )  Color: x / Appearance: x / SG: x / pH: x  Gluc: 89 mg/dL / Ketone: x  / Bili: x / Urobili: x   Blood: x / Protein: x / Nitrite: x   Leuk Esterase: x / RBC: x / WBC x   Sq Epi: x / Non Sq Epi: x / Bacteria: x    CULTURES:    Physical Examination:  General: Alert, oriented, interactive, nonfocal.  HEENT: PERRL.  NECK: Supple.   PULM: Clear to auscultation bilaterally.  CVS: s1/s2.  ABD: Soft, nondistended, nontender, normoactive bowel sounds.  EXT: No edema, nontender.  SKIN: Warm.    RADIOLOGY:   < from: CT Head No Cont (06.06.24 @ 03:08) >  ACC: [id number] EXAM:  CT BRAIN   ORDERED BY: CATHI MONTES   PROCEDURE DATE:  06/06/2024    IMPRESSION:  No acute intracranial hemorrhage, mass effect, or acute osseous fracture.  Dense calcifications in the left basal ganglia unchanged compared to   prior PET CTs.  Left middleear and mastoid effusion. Correlate with clinical exam.    Time spent on this patient encounter, which includes documenting this note in the electronic medical record, was +50 minutes including assessing the presenting problems with associated risks, reviewing the medical record to prepare for the encounter, and meeting face to face with the patient to obtain additional history. I have also performed an appropriate physical exam, made interventions listed and ordered and interpreted appropriate diagnostic studies as documented. To improve communication and patient safety, I have coordinated care with the multidisciplinary team including the bedside nurse, appropriate attending of record and consultants as needed. This time is independent of any procedures performed.    Date of entry of this note is equal to the date of services rendered.

## 2024-06-10 NOTE — PROGRESS NOTE ADULT - ASSESSMENT
Assessment:  John Becerra is a 79 year old man with past medical history of Bipolar disorder, Coronary artery disease (history of STEMI in 2022, with multivessel CAD with partial viability on nuclear), HFrEF (LVEF 35% in May 2022), and Lung cancer (s/p radiation and lobectomy) with recent hospitalization for acute on chronic systolic heart failure now presents with hypotension and lethargy, found to have signs of end stage heart failure and cardiorenal syndrome.     ECG on admission consistent with sinus bradycardia, RBBB and old anteroseptal/anterior infarct, similar to prior ECG. Troponins elevated and have peaked, likely secondary to renal disease. Echo report with LVEF < 20%, severely reduced RV systolic function, moderate-severe tricuspid regurgitation. Prior coronary angiogram from 2022 with normal left main, 70% stenosis of LAD, 70% stenosis of first diagonal, normal LCx and 100% stenosis of RCA that was medically managed after viability testing indicated mostly infarcted tissue.    Recommendations:  [] End stage heart failure: As per discussion with Dr. Ardon and per chart review it appears that cardiologist, Dr. Oh had discussion with oncologist and patient's heart failure physician, Dr. Jay Shell and patient was deemed not to be a candidate for advanced therapies based on his lung cancer and renal disease and they recommend Dobutamine infusion for palliative support. Recommend Palliative care consult, possible home hospice if no improvement.  In regards to guideline directed medical therapy due to CKD with a GFR of 26, the patient is likely not an ideal candidate for Entresto, Farxiga and Spironolactone at this time. Continue diuresis as tolerated.     Discussed with Dr. Ardon. Prognosis is guarded. We will continue to follow along.    Adam Gilliam MD  Cardiology

## 2024-06-10 NOTE — PROGRESS NOTE ADULT - PROBLEM SELECTOR PLAN 1
Patient with advanced lung cancer, initially treated with systemic chemotherapy, CKD, recently with an episode of hypotension, requiring dobutamine drip in CCU monitoring.  Heart failure is reportedly end-stage and dobutamine drip is for comfort.  Continue to monitor CBC and electrolytes.  No plans for systemic chemotherapy.  Case discussed with both cardiology and patient's oncologist.  Family in agreement with the plan.

## 2024-06-10 NOTE — PROGRESS NOTE ADULT - SUBJECTIVE AND OBJECTIVE BOX
ELYSE MALAVE, 79y Male  MRN: [id number]  ATTENDING: Nikolas Brewster    HPI:    MEDICATIONS:  acetaminophen     Tablet .. 650 milliGRAM(s) Oral every 6 hours PRN  aluminum hydroxide/magnesium hydroxide/simethicone Suspension 30 milliLiter(s) Oral every 4 hours PRN  aspirin enteric coated 81 milliGRAM(s) Oral daily  buMETAnide Injectable 1 milliGRAM(s) IV Push two times a day  chlorhexidine 2% Cloths 1 Application(s) Topical <User Schedule>  clopidogrel Tablet 75 milliGRAM(s) Oral daily  FLUoxetine 20 milliGRAM(s) Oral <User Schedule>  heparin   Injectable 5000 Unit(s) SubCutaneous every 12 hours  melatonin 3 milliGRAM(s) Oral at bedtime PRN  ondansetron Injectable 4 milliGRAM(s) IV Push every 8 hours PRN  pantoprazole    Tablet 40 milliGRAM(s) Oral before breakfast  rosuvastatin 20 milliGRAM(s) Oral at bedtime  sodium bicarbonate 650 milliGRAM(s) Oral three times a day  tamsulosin 0.8 milliGRAM(s) Oral at bedtime  traZODone 25 milliGRAM(s) Oral at bedtime PRN  vancomycin  IVPB 1000 milliGRAM(s) IV Intermittent once    All other medications reviewed.    SUBJECTIVE:    VITALS:  T(C): 37 (06-10-24 @ 05:00), Max: 37 (06-10-24 @ 05:00)  T(F): 98.6 (06-10-24 @ 05:00), Max: 98.6 (06-10-24 @ 05:00)  HR: 68 (06-10-24 @ 06:00) (64 - 76)  BP: 100/64 (06-10-24 @ 06:00) (81/63 - 111/68)      PHYSICAL EXAM:  Constitutional: alert, well developed  HEENT: normocephalic, anicteric sclerae, no mucositis or thrush  Respiratory: bilateral clear to auscultation anteriorly  Cardiovascular : S1, S2 regular, rhythmic, no murmurs, gallops or rubs  Abdomen: soft, distended, + normoactive BS, no palpable HS- megaly  Extremities: no tenderness;  -c/c/e, pulses equal bilaterally    LABS:  (06-10) WBC: 4.73 K/uL,Hemoglobin: 11.7 g/dL, Hematocrit: 33.8 %,    Platelet: 95 K/uL    (06-10) Na: 132 mmol/L ; K: 3.5 mmol/L ; BUN: 76 mg/dL ; Cr: 2.48 mg/dL.        RADIOLOGY:     ELYSE MALAVE, 79y Male  MRN: [id number]  ATTENDING: Nikolas Brewster    HPI:    MEDICATIONS:  acetaminophen     Tablet .. 650 milliGRAM(s) Oral every 6 hours PRN  aluminum hydroxide/magnesium hydroxide/simethicone Suspension 30 milliLiter(s) Oral every 4 hours PRN  aspirin enteric coated 81 milliGRAM(s) Oral daily  buMETAnide Injectable 1 milliGRAM(s) IV Push two times a day  chlorhexidine 2% Cloths 1 Application(s) Topical <User Schedule>  clopidogrel Tablet 75 milliGRAM(s) Oral daily  FLUoxetine 20 milliGRAM(s) Oral <User Schedule>  heparin   Injectable 5000 Unit(s) SubCutaneous every 12 hours  melatonin 3 milliGRAM(s) Oral at bedtime PRN  ondansetron Injectable 4 milliGRAM(s) IV Push every 8 hours PRN  pantoprazole    Tablet 40 milliGRAM(s) Oral before breakfast  rosuvastatin 20 milliGRAM(s) Oral at bedtime  sodium bicarbonate 650 milliGRAM(s) Oral three times a day  tamsulosin 0.8 milliGRAM(s) Oral at bedtime  traZODone 25 milliGRAM(s) Oral at bedtime PRN  vancomycin  IVPB 1000 milliGRAM(s) IV Intermittent once    All other medications reviewed.    SUBJECTIVE:  Feeling better, less dyspneic, continues Dobutamine drip    VITALS:  T(C): 37 (06-10-24 @ 05:00), Max: 37 (06-10-24 @ 05:00)  T(F): 98.6 (06-10-24 @ 05:00), Max: 98.6 (06-10-24 @ 05:00)  HR: 68 (06-10-24 @ 06:00) (64 - 76)  BP: 100/64 (06-10-24 @ 06:00) (81/63 - 111/68)      PHYSICAL EXAM:  Constitutional: alert, well developed  HEENT: normocephalic, anicteric sclerae, no mucositis or thrush  Respiratory: bilateral clear to auscultation anteriorly  Cardiovascular : S1, S2 regular, rhythmic, no murmurs, gallops or rubs  Abdomen: soft, distended, + normoactive BS, no palpable HS- megaly  Extremities: no tenderness;  -c/c/e, pulses equal bilaterally    LABS:  (06-10) WBC: 4.73 K/uL,Hemoglobin: 11.7 g/dL, Hematocrit: 33.8 %,  Platelet: 95 K/uL  (06-10) Na: 132 mmol/L ; K: 3.5 mmol/L ; BUN: 76 mg/dL ; Cr: 2.48 mg/dL.    RADIOLOGY:  ACC: [id number] EXAM:  CT BRAIN   ORDERED BY: CATHI MONTES     PROCEDURE DATE:  06/06/2024          INTERPRETATION:  NONCONTRAST CT OF THE BRAIN DATED 6/6/2024.    CLINICAL INFORMATION:  Head injury.    TECHNIQUE: Axial CT images are obtained from the cranial vertex to the   skull base without the administration of IV contrast. Images are   reformatted in sagittal and coronal planes.    The study is correlated with an MRI of the brain from 2/1/2023 and PET/CT   from 5/5/2024..    FINDINGS:    There is no acute intra-axial or extra-axial hemorrhage. There are dense   calcifications in the left basal ganglia which are seen on prior PET CTs.   There is no mass effect or shift of the midline. The basal cisterns are   not effaced. There is cerebral and cerebellar volume loss with prominence   of the ventricles, sulci, and cerebellar folia. There are mild chronic   ischemic changes in the frontoparietal white matter. There are   atherosclerotic calcifications of the intracranial carotid arteries.    There is no significant scalp soft tissue swelling or scalp hematoma. The   skull base and bony calvarium are intact. The visualized paranasal   sinuses and tympanic/mastoid cavities are clear apart from minimal   bilateral ethmoid mucosal thickening and a left middle ear and mastoid   effusion.    IMPRESSION:  No acute intracranial hemorrhage, mass effect, or acute osseous fracture.  Dense calcifications in the left basal ganglia unchanged compared to prior PET CTs.  Left middleear and mastoid effusion. Correlate with clinical exam.     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  Patient was in the sitting position when he fell forward and hit his chin and left arm, with noticeable bruising on physical examination.  Of note, CT chest in early April 2024 showed left upper lobe nodule within the radiation field of more rounded contour, gradually becoming more discrete compared to prior studies.  Adjustment in cardiac medication done by cardiology (Dr. Walden reduced Coreg by 50%).  Oncology consulted in light of evidence of POD on CT chest.    MEDICATIONS:  acetaminophen     Tablet .. 650 milliGRAM(s) Oral every 6 hours PRN  aluminum hydroxide/magnesium hydroxide/simethicone Suspension 30 milliLiter(s) Oral every 4 hours PRN  aspirin enteric coated 81 milliGRAM(s) Oral daily  buMETAnide Injectable 1 milliGRAM(s) IV Push two times a day  chlorhexidine 2% Cloths 1 Application(s) Topical <User Schedule>  clopidogrel Tablet 75 milliGRAM(s) Oral daily  FLUoxetine 20 milliGRAM(s) Oral <User Schedule>  heparin   Injectable 5000 Unit(s) SubCutaneous every 12 hours  melatonin 3 milliGRAM(s) Oral at bedtime PRN  ondansetron Injectable 4 milliGRAM(s) IV Push every 8 hours PRN  pantoprazole    Tablet 40 milliGRAM(s) Oral before breakfast  rosuvastatin 20 milliGRAM(s) Oral at bedtime  sodium bicarbonate 650 milliGRAM(s) Oral three times a day  tamsulosin 0.8 milliGRAM(s) Oral at bedtime  traZODone 25 milliGRAM(s) Oral at bedtime PRN  vancomycin  IVPB 1000 milliGRAM(s) IV Intermittent once    All other medications reviewed.    SUBJECTIVE:  Feeling better, less dyspneic, continues Dobutamine drip    VITALS:  T(C): 37 (06-10-24 @ 05:00), Max: 37 (06-10-24 @ 05:00)  T(F): 98.6 (06-10-24 @ 05:00), Max: 98.6 (06-10-24 @ 05:00)  HR: 68 (06-10-24 @ 06:00) (64 - 76)  BP: 100/64 (06-10-24 @ 06:00) (81/63 - 111/68)      PHYSICAL EXAM:  Constitutional: alert, well developed  HEENT: normocephalic, anicteric sclerae, no mucositis or thrush  Respiratory: bilateral clear to auscultation anteriorly  Cardiovascular : S1, S2 regular, rhythmic, no murmurs, gallops or rubs  Abdomen: soft, distended, + normoactive BS, no palpable HS- megaly  Extremities: no tenderness;  -c/c/e, pulses equal bilaterally    LABS:  (06-10) WBC: 4.73 K/uL,Hemoglobin: 11.7 g/dL, Hematocrit: 33.8 %,  Platelet: 95 K/uL  (06-10) Na: 132 mmol/L ; K: 3.5 mmol/L ; BUN: 76 mg/dL ; Cr: 2.48 mg/dL.    RADIOLOGY:  ACC: [id number] EXAM:  CT BRAIN   ORDERED BY: CATHI MONTES     PROCEDURE DATE:  06/06/2024          INTERPRETATION:  NONCONTRAST CT OF THE BRAIN DATED 6/6/2024.    CLINICAL INFORMATION:  Head injury.    TECHNIQUE: Axial CT images are obtained from the cranial vertex to the   skull base without the administration of IV contrast. Images are   reformatted in sagittal and coronal planes.    The study is correlated with an MRI of the brain from 2/1/2023 and PET/CT   from 5/5/2024..    FINDINGS:    There is no acute intra-axial or extra-axial hemorrhage. There are dense   calcifications in the left basal ganglia which are seen on prior PET CTs.   There is no mass effect or shift of the midline. The basal cisterns are   not effaced. There is cerebral and cerebellar volume loss with prominence   of the ventricles, sulci, and cerebellar folia. There are mild chronic   ischemic changes in the frontoparietal white matter. There are   atherosclerotic calcifications of the intracranial carotid arteries.    There is no significant scalp soft tissue swelling or scalp hematoma. The   skull base and bony calvarium are intact. The visualized paranasal   sinuses and tympanic/mastoid cavities are clear apart from minimal   bilateral ethmoid mucosal thickening and a left middle ear and mastoid   effusion.    IMPRESSION:  No acute intracranial hemorrhage, mass effect, or acute osseous fracture.  Dense calcifications in the left basal ganglia unchanged compared to prior PET CTs.  Left middleear and mastoid effusion. Correlate with clinical exam.

## 2024-06-10 NOTE — PROGRESS NOTE ADULT - ASSESSMENT
79 year old male with history of combined systolic/diastolic HF with EF 25-30%(Apr/2024), CAD, CKD, BPH, lung CA s/p RUL lobectomy(Sep/2022), Rtx(Mar/2023), who was admitted on 6/6/24 for [facility]. Palliative medicine team was consulted to assist with GOC.    1. Patient denies any symptom burdens. Can use opioids for dyspnea if needed.     2. Advanced  Directives  - surrogate: Kenia(wife)  - code status: DNR/DNI     3. Palliative medicine encounter   - Patient and family declined hospice service, looking for visiting nurse service. Please reach out to us if there is any further unmet palliative need.

## 2024-06-10 NOTE — PROGRESS NOTE ADULT - SUBJECTIVE AND OBJECTIVE BOX
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 Pt was BIBEMS to  ED due to lethargy and hypotension tonight after taking temazepam, Prozac, flomax and Coreg. Of note, Pt had syncope event on  from a sitting position he fell forward and hit his chin and Lt arm, which are bruised. Pt accompanied by wife, who contributed most of the history, she state Pt has been slower and had decreased appetite in the last week after falling. Pt saw Dr. Walden today and Coreg was reduced by half but Pt has not started on regimen. Denies any further falls or head trauma. Denies fever, chills, chest pain, abdominal pain, vomiting, diarrhea or dysuria.     In the ED, VS T 97.1F, HR 59, BP 86/58, RR 18, SpO2 98% on NC. As per ED provider, Pt was lethargic with pinpoint pupils, given Narcan and he woke up and became more alert. Labs pertinent for Na 131, Cr 3.29, , bilirubin 3.4, Trop 105.4, Alk phos 444, , , Mg 2.8, Phosp 6.3, and BNP 5254. CT Head neg, CXR no effusion/PNA. Pt was given midodrine 10mg with improvement of /72.     (2024 02:17)    Today:  pt  weaned to 2.5 mcg/     pt net neg on bumex q 12 hr      ============================I/O===========================   I&O's Detail    2024 07:01  -  10 Alirio 2024 07:00  --------------------------------------------------------  IN:    DOBUTamine: 7 mL    DOBUTamine: 63.5 mL  Total IN: 70.5 mL    OUT:    Indwelling Catheter - Urethral (mL): 550 mL    Voided (mL): 2810 mL  Total OUT: 3360 mL    Total NET: -3289.5 mL            ============================ LABS =========================                        11.7   4.73  )-----------( 95       ( 10 Alirio 2024 05:00 )             33.8     06-10    132<L>  |  97  |  76<H>  ----------------------------<  89  3.5   |  20<L>  |  2.48<H>    Ca    8.2<L>      10 Alirio 2024 05:00  Phos  3.4     06-10  Mg     2.1     06-10          ======================Micro/Rad/Cardio=================  Culture: Reviewed   CXR: Reviewed  Echo:Reviewed  ======================================================  PAST MEDICAL & SURGICAL HISTORY:  BPH (benign prostatic hyperplasia)      Bipolar disorder      Depression      Anxiety      Umbilical hernia, incarcerated      Constipation      Urinary retention      CAD (coronary artery disease)      SCC (squamous cell carcinoma)      Lung mass      CHF, chronic      Heart failure with reduced ejection fraction      History of arthroscopy of knee  Right,       History of tonsillectomy        History of hernia repair      H/O coronary angiogram      NEURO: awake and alert  CV: (+) S1/S2, rrr, no mrg  RESP: CTA b/l  GI: soft, non tender              ====================ASSESMENT ==============   79 year old DNR/DNI male with PMH CAD, systolic HF with EF 25-30%, right heart failure, valvular disease, CKD, BPH, anxiety/depression/bipolar disorder, lung cancer presenting with acute decompensated heart failure.    Plan:  ====================== NEUROLOGY=====================  acetaminophen     Tablet .. 650 milliGRAM(s) Oral every 6 hours PRN Temp greater or equal to 38C (100.4F), Mild Pain (1 - 3)  FLUoxetine 20 milliGRAM(s) Oral <User Schedule>  melatonin 3 milliGRAM(s) Oral at bedtime PRN Insomnia  ondansetron Injectable 4 milliGRAM(s) IV Push every 8 hours PRN Nausea and/or Vomiting    cont home dose of fluoxetine  neuro exam intact     ==================== RESPIRATORY======================       supp o2  aggressive chest pt     ====================CARDIOVASCULAR==================  buMETAnide Injectable 1 milliGRAM(s) IV Push two times a day  DOBUTamine Infusion 2.5 MICROgram(s)/kG/Min (3.51 mL/Hr) IV Continuous <Continuous>    Echo noted with severe lv dfx.  Not on GDMT 2/2 renal insufficiency and hypotension  will attempt to wean off dobutamine today- plan for possible home hospice if remains w/o exacerbation of symptoms   keep net neg daily with bumex   cardiology follow up  goal is net neg daily   should get daily weights     ===================HEMATOLOGIC/ONC ===================  aspirin enteric coated 81 milliGRAM(s) Oral daily  clopidogrel Tablet 75 milliGRAM(s) Oral daily  heparin   Injectable 5000 Unit(s) SubCutaneous every 12 hours    lung cancer hx s/p RUL lobectomy, recurrent dz s/p radiation. Recent PET scan showed HARRY lung nodule-fu oncology consult  dapt for cardiac stents   GI / DVT prophylaxis.    ===================== RENAL =========================  Chandler for monitoring urine output  tamsulosin 0.8 milliGRAM(s) Oral at bedtime    monitor creatine, trending downward  keep k>4   mag >2  strict i/o    ==================== GASTROINTESTINAL===================  aluminum hydroxide/magnesium hydroxide/simethicone Suspension 30 milliLiter(s) Oral every 4 hours PRN Dyspepsia  pantoprazole    Tablet 40 milliGRAM(s) Oral before breakfast  sodium bicarbonate 650 milliGRAM(s) Oral three times a day    cont PPI   tolerating diet     =======================    ENDOCRINE  =====================  rosuvastatin 20 milliGRAM(s) Oral at bedtime    cont statin for known HLD  ========================INFECTIOUS DISEASE================  no abx at present                            ELYSE MALAVE  MRN-[id number]  Patient is a 79y old  Male who presents with a chief complaint of hypotension, elevated trop (2024 11:09)    HPI:  79M PMH CAD, 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 Pt was BIBEMS to  ED due to lethargy and hypotension tonight after taking temazepam, Prozac, flomax and Coreg. Of note, Pt had syncope event on  from a sitting position he fell forward and hit his chin and Lt arm, which are bruised. Pt accompanied by wife, who contributed most of the history, she state Pt has been slower and had decreased appetite in the last week after falling. Pt saw Dr. Walden today and Coreg was reduced by half but Pt has not started on regimen. Denies any further falls or head trauma. Denies fever, chills, chest pain, abdominal pain, vomiting, diarrhea or dysuria.     In the ED, VS T 97.1F, HR 59, BP 86/58, RR 18, SpO2 98% on NC. As per ED provider, Pt was lethargic with pinpoint pupils, given Narcan and he woke up and became more alert. Labs pertinent for Na 131, Cr 3.29, , bilirubin 3.4, Trop 105.4, Alk phos 444, , , Mg 2.8, Phosp 6.3, and BNP 5254. CT Head neg, CXR no effusion/PNA. Pt was given midodrine 10mg with improvement of /72.     (2024 02:17)    Today:  pt  weaned to 2.5 mcg/     pt net neg on bumex q 12 hr      ============================I/O===========================   I&O's Detail    2024 07:01  -  10 Alirio 2024 07:00  --------------------------------------------------------  IN:    DOBUTamine: 7 mL    DOBUTamine: 63.5 mL  Total IN: 70.5 mL    OUT:    Indwelling Catheter - Urethral (mL): 550 mL    Voided (mL): 2810 mL  Total OUT: 3360 mL    Total NET: -3289.5 mL            ============================ LABS =========================                        11.7   4.73  )-----------( 95       ( 10 Alirio 2024 05:00 )             33.8     06-10    132<L>  |  97  |  76<H>  ----------------------------<  89  3.5   |  20<L>  |  2.48<H>    Ca    8.2<L>      10 Alirio 2024 05:00  Phos  3.4     06-10  Mg     2.1     06-10          ======================Micro/Rad/Cardio=================  Culture: Reviewed   CXR: Reviewed  Echo:Reviewed  ======================================================  PAST MEDICAL & SURGICAL HISTORY:  BPH (benign prostatic hyperplasia)      Bipolar disorder      Depression      Anxiety      Umbilical hernia, incarcerated      Constipation      Urinary retention      CAD (coronary artery disease)      SCC (squamous cell carcinoma)      Lung mass      CHF, chronic      Heart failure with reduced ejection fraction      History of arthroscopy of knee  Right,       History of tonsillectomy        History of hernia repair      H/O coronary angiogram      NEURO: awake and alert  CV: (+) S1/S2, rrr, no mrg  RESP: CTA b/l  GI: soft, non tender    SKIN: R erythema R forearm tracking from cubital fossa            ====================ASSESMENT ==============   79 year old DNR/DNI male with PMH CAD, systolic HF with EF 25-30%, right heart failure, valvular disease, CKD, BPH, anxiety/depression/bipolar disorder, lung cancer presenting with acute decompensated heart failure.    Plan:  ====================== NEUROLOGY=====================  acetaminophen     Tablet .. 650 milliGRAM(s) Oral every 6 hours PRN Temp greater or equal to 38C (100.4F), Mild Pain (1 - 3)  FLUoxetine 20 milliGRAM(s) Oral <User Schedule>  melatonin 3 milliGRAM(s) Oral at bedtime PRN Insomnia  ondansetron Injectable 4 milliGRAM(s) IV Push every 8 hours PRN Nausea and/or Vomiting    cont home dose of fluoxetine  neuro exam intact     ==================== RESPIRATORY======================       supp o2  aggressive chest pt     ====================CARDIOVASCULAR==================  buMETAnide Injectable 1 milliGRAM(s) IV Push two times a day  DOBUTamine Infusion 2.5 MICROgram(s)/kG/Min (3.51 mL/Hr) IV Continuous <Continuous>    Echo noted with severe lv dfx.  Not on GDMT 2/2 renal insufficiency and hypotension  will attempt to wean off dobutamine today- plan for possible home hospice if remains w/o exacerbation of symptoms   keep net neg daily with bumex   cardiology follow up  goal is net neg daily   should get daily weights     ===================HEMATOLOGIC/ONC ===================  aspirin enteric coated 81 milliGRAM(s) Oral daily  clopidogrel Tablet 75 milliGRAM(s) Oral daily  heparin   Injectable 5000 Unit(s) SubCutaneous every 12 hours    lung cancer hx s/p RUL lobectomy, recurrent dz s/p radiation. Recent PET scan showed HARRY lung nodule-fu oncology consult  dapt for cardiac stents   GI / DVT prophylaxis.    ===================== RENAL =========================  Chandler for monitoring urine output  tamsulosin 0.8 milliGRAM(s) Oral at bedtime    monitor creatine, trending downward  keep k>4   mag >2  strict i/o    ==================== GASTROINTESTINAL===================  aluminum hydroxide/magnesium hydroxide/simethicone Suspension 30 milliLiter(s) Oral every 4 hours PRN Dyspepsia  pantoprazole    Tablet 40 milliGRAM(s) Oral before breakfast  sodium bicarbonate 650 milliGRAM(s) Oral three times a day    cont PPI   tolerating diet     =======================    ENDOCRINE  =====================  rosuvastatin 20 milliGRAM(s) Oral at bedtime    cont statin for known HLD  ========================INFECTIOUS DISEASE================  will order abx for R forearm thrombophlebitis  no leukocytosis on cbc  continue to monitor

## 2024-06-10 NOTE — PROGRESS NOTE ADULT - SUBJECTIVE AND OBJECTIVE BOX
ELYSE MALAVE  [id number]      Chief Complaint: End stage heart failure/Lung cancer    Interval History: The patient reports urinating well, reports breathing is stable and feels better.     Tele: sinus 70s BPM    Current meds:   acetaminophen     Tablet .. 650 milliGRAM(s) Oral every 6 hours PRN  aluminum hydroxide/magnesium hydroxide/simethicone Suspension 30 milliLiter(s) Oral every 4 hours PRN  aspirin enteric coated 81 milliGRAM(s) Oral daily  buMETAnide Injectable 1 milliGRAM(s) IV Push two times a day  chlorhexidine 2% Cloths 1 Application(s) Topical <User Schedule>  clopidogrel Tablet 75 milliGRAM(s) Oral daily  DOBUTamine Infusion 2.5 MICROgram(s)/kG/Min IV Continuous <Continuous>  FLUoxetine 20 milliGRAM(s) Oral <User Schedule>  heparin   Injectable 5000 Unit(s) SubCutaneous every 12 hours  melatonin 3 milliGRAM(s) Oral at bedtime PRN  ondansetron Injectable 4 milliGRAM(s) IV Push every 8 hours PRN  pantoprazole    Tablet 40 milliGRAM(s) Oral before breakfast  rosuvastatin 20 milliGRAM(s) Oral at bedtime  sodium bicarbonate 650 milliGRAM(s) Oral three times a day  tamsulosin 0.8 milliGRAM(s) Oral at bedtime      Objective:     Vital Signs:   T(C): 37 (06-10-24 @ 05:00), Max: 37 (06-10-24 @ 05:00)  HR: 68 (06-10-24 @ 06:00) (64 - 76)  BP: 100/64 (06-10-24 @ 06:00) (81/63 - 117/96)  RR: 14 (06-10-24 @ 06:00) (9 - 19)  SpO2: 99% (06-10-24 @ 06:00) (96% - 100%)  Wt(kg): --    Physical Exam:   General: no acute distress  HEENT: sclera anicteric  Neck: supple  CVS: JVP ~ 7 cm H20, RRR, s1, s2, systolic murmur  Chest: unlabored respirations, CTAB  Abdomen: non-distended  Extremities: no lower extremity edema b/l  Neuro: awake, alert & oriented  Psych: normal affect      Labs:   10 Alirio 2024 05:00    132    |  97     |  76     ----------------------------<  89     3.5     |  20     |  2.48     Ca    8.2        10 Alirio 2024 05:00  Phos  3.4       10 Alirio 2024 05:00  Mg     2.1       10 Alirio 2024 05:00                            11.7   4.73  )-----------( 95       ( 10 Alirio 2024 05:00 )             33.8           TTE (4/7/24):  LVEF 25-30%, mild LVH  Mildly enlarged right ventricle.  Mild-moderate MR, Moderate TR, Mild AR, Mild KS    TTE (6/6/24):  LVEF < 20%  Severely enlarged RV with severely reduced RV systolic function  RV pressure/volume overload  Moderate MR, Moderate-severe TR, Mild AR  Mild pulmonary hypertension       ECG (6/6/24): sinus bradycardia, first degree AV block, RBBB, anteroseptal/anterior infarct (similar to prior ECG)

## 2024-06-10 NOTE — PROGRESS NOTE ADULT - ATTENDING COMMENTS
79 year old DNR/DNI male with PMH CAD, systolic HF with EF 25-30%, right heart failure, valvular disease, CKD, BPH, anxiety/depression/bipolar disorder, lung cancer presenting with acute decompensated heart failure. ICU consulted for inotropic support for acute heart failure.     Assessment  1. Acute decompensation of heart failure     not a candidate for RVAD / LVAD based on discussion with cardio    trial of  requested  2. CKD   3. CAD  4. Lung cancer in non smoker with new lung nodules  Underlying BPH, Bipolar d/o, Depression, Anxiety, CAD (coronary artery disease)    Plan:  Monitor off dobutamine infusion   Close hemodynamic monitoring  diuresis and aim for negative fluid balance - cont. bumex 1 mg BID  Supplement potassium   monitor labs see if renal function and liver function improves  n/c o2  over all prognosis is poor  Cardiology and Nephrology follow up   Voiding independently  Noted with evidence of superficial phlebitis at previous IV site, will initiate Vancomycin   Palliative care following and home hospice is being considered if not improve.   C DNR/I.

## 2024-06-10 NOTE — PROGRESS NOTE ADULT - SUBJECTIVE AND OBJECTIVE BOX
SUBJECTIVE AND OBJECTIVE:  Indication for Geriatrics and Palliative Care Services/INTERVAL HPI:  Patient is off dobutamine drip. Respiratory status improved. Patient is able to ambulate without assistance.     OVERNIGHT EVENTS:    Allergies    No Known Allergies    Intolerances    MEDICATIONS  (STANDING):  aspirin enteric coated 81 milliGRAM(s) Oral daily  buMETAnide Injectable 1 milliGRAM(s) IV Push two times a day  chlorhexidine 2% Cloths 1 Application(s) Topical <User Schedule>  clopidogrel Tablet 75 milliGRAM(s) Oral daily  FLUoxetine 20 milliGRAM(s) Oral <User Schedule>  heparin   Injectable 5000 Unit(s) SubCutaneous every 12 hours  pantoprazole    Tablet 40 milliGRAM(s) Oral before breakfast  rosuvastatin 20 milliGRAM(s) Oral at bedtime  sodium bicarbonate 650 milliGRAM(s) Oral three times a day  tamsulosin 0.8 milliGRAM(s) Oral at bedtime    MEDICATIONS  (PRN):  acetaminophen     Tablet .. 650 milliGRAM(s) Oral every 6 hours PRN Temp greater or equal to 38C (100.4F), Mild Pain (1 - 3)  aluminum hydroxide/magnesium hydroxide/simethicone Suspension 30 milliLiter(s) Oral every 4 hours PRN Dyspepsia  melatonin 3 milliGRAM(s) Oral at bedtime PRN Insomnia  ondansetron Injectable 4 milliGRAM(s) IV Push every 8 hours PRN Nausea and/or Vomiting  traZODone 25 milliGRAM(s) Oral at bedtime PRN Insomnia      ITEMS UNCHECKED ARE NOT PRESENT    PRESENT SYMPTOMS: [ ]Unable to self-report   Source if other than patient:  [ ]Family   [ ]Team     Pain:  [ ]yes [X ]no  QOL impact -   Location -                    Aggravating factors -  Quality -  Radiation -  Timing-  Severity (0-10 scale):  Minimal acceptable level (0-10 scale):     Dyspnea:                           [X ]Mild [ ]Moderate [ ]Severe  Anxiety:                             [ ]Mild [ ]Moderate [ ]Severe  Fatigue:                             [ ]Mild [ ]Moderate [ ]Severe  Nausea:                             [ ]Mild [ ]Moderate [ ]Severe  Loss of appetite:              [ ]Mild [ ]Moderate [ ]Severe  Constipation:                    [ ]Mild [ ]Moderate [ ]Severe      Other Symptoms:  [X ]All other review of systems negative     Chaplaincy Referral: [ ] yes [ ] refused [ ] following [ ] Deferred   Palliative Performance Status Version 2:         %      http://npcrc.org/files/news/palliative_performance_scale_ppsv2.pdf    PHYSICAL EXAM:  Vital Signs Last 24 Hrs  T(C): 37 (10 Alirio 2024 05:00), Max: 37 (10 Alirio 2024 05:00)  T(F): 98.6 (10 Alirio 2024 05:00), Max: 98.6 (10 Alirio 2024 05:00)  HR: 76 (10 Alirio 2024 12:00) (64 - 78)  BP: 99/77 (10 Alirio 2024 09:00) (81/63 - 111/68)  BP(mean): 86 (10 Alirio 2024 09:00) (70 - 86)  RR: 13 (10 Alirio 2024 12:00) (9 - 22)  SpO2: 97% (10 Alirio 2024 12:00) (96% - 100%)    Parameters below as of 10 Alirio 2024 06:00  Patient On (Oxygen Delivery Method): room air     I&O's Summary    09 Jun 2024 07:01  -  10 Alirio 2024 07:00  --------------------------------------------------------  IN: 70.5 mL / OUT: 3360 mL / NET: -3289.5 mL       GENERAL: [ ]Cachexia    [ X]Alert  [X ]Oriented x 3  [ ]Lethargic  [ ]Unarousable  [ ]Verbal  [ ]Non-Verbal  Behavioral:   [ ]Anxiety  [ ]Delirium [ ]Agitation [ ]Other  HEENT:  [ ]Normal   [ ]Dry mouth   [ ]ET Tube/Trach  [ ]Oral lesions  PULMONARY:   [ ]Clear [ ]Tachypnea  [ ]Audible excessive secretions   [ ]Rhonchi        [ ]Right [ ]Left [ ]Bilateral  [ ]Crackles        [ ]Right [ ]Left [ ]Bilateral  [ ]Wheezing     [ ]Right [ ]Left [ ]Bilateral  [ ]Diminished BS [ ] Right [ ]Left [ ]Bilateral  CARDIOVASCULAR:    [ ]Regular [ ]Irregular [ ]Tachy  [ ]Christiano [ ]Murmur [ ]Other  GASTROINTESTINAL:  [ ]Soft  [ ]Distended   [ ]+BS  [ ]Non tender [ ]Tender  [ ]Other [ ]PEG [ ]OGT/ NGT   Last BM:   GENITOURINARY:  [ ]Normal [ ]Incontinent   [ ]Oliguria/Anuria   [ ]Chandler  MUSCULOSKELETAL:   [ ]Normal   [ ]Weakness  [ ]Bed/Wheelchair bound [ ]Edema  NEUROLOGIC:   [ ]No focal deficits  [ ] Cognitive impairment  [ ] Dysphagia [ ]Dysarthria [ ] Paresis [ ]Other   SKIN:   [ ]Normal  [ ]Rash  [ ]Other  [ ]Pressure ulcer(s) [ ]y [ ]n present on admission    CRITICAL CARE:  [ ]Shock Present  [ ]Septic [ ]Cardiogenic [ ]Neurologic [ ]Hypovolemic  [ ]Vasopressors [ ]Inotropes  [ ]Respiratory failure present [ ]Mechanical Ventilation [ ]Non-invasive ventilatory support [ ]High-Flow   [ ]Acute  [ ]Chronic [ ]Hypoxic  [ ]Hypercarbic [ ]Other  [ ]Other organ failure     LABS:                        11.7   4.73  )-----------( 95       ( 10 Alirio 2024 05:00 )             33.8   06-10    132<L>  |  97  |  76<H>  ----------------------------<  89  3.5   |  20<L>  |  2.48<H>    Ca    8.2<L>      10 Alirio 2024 05:00  Phos  3.4     06-10  Mg     2.1     06-10        Urinalysis Basic - ( 10 Alirio 2024 05:00 )    Color: x / Appearance: x / SG: x / pH: x  Gluc: 89 mg/dL / Ketone: x  / Bili: x / Urobili: x   Blood: x / Protein: x / Nitrite: x   Leuk Esterase: x / RBC: x / WBC x   Sq Epi: x / Non Sq Epi: x / Bacteria: x      RADIOLOGY & ADDITIONAL STUDIES:    Protein Calorie Malnutrition Present: [ ]mild [ ]moderate [ ]severe [ ]underweight [ ]morbid obesity  https://www.andeal.org/vault/6960/web/files/ONC/Table_Clinical%20Characteristics%20to%20Document%20Malnutrition-White%20JV%20et%20al%202012.pdf    Height (cm): 182.9 (06-07-24 @ 12:30), 182.9 (05-30-24 @ 07:31), 182.9 (05-25-24 @ 11:08)  Weight (kg): 93.6 (06-07-24 @ 12:30), 84.4 (05-30-24 @ 07:31), 82.6 (05-25-24 @ 11:08)  BMI (kg/m2): 28 (06-07-24 @ 12:30), 25.2 (05-30-24 @ 07:31), 24.7 (05-25-24 @ 11:08)    [ ]PPSV2 < or = 30%  [ ]significant weight loss [ ]poor nutritional intake [ ]anasarca[ ]Artificial Nutrition    Other REFERRALS:  [ ]Hospice  [ ]Child Life  [ ]Social Work  [ ]Case management [ ]Holistic Therapy

## 2024-06-11 ENCOUNTER — APPOINTMENT (OUTPATIENT)
Dept: SURGERY | Facility: CLINIC | Age: 79
End: 2024-06-11

## 2024-06-11 DIAGNOSIS — R04.0 EPISTAXIS: ICD-10-CM

## 2024-06-11 LAB
ALBUMIN SERPL ELPH-MCNC: 2.8 G/DL — LOW (ref 3.3–5)
ALP SERPL-CCNC: 528 U/L — HIGH (ref 40–120)
ALT FLD-CCNC: 174 U/L — HIGH (ref 10–45)
ANION GAP SERPL CALC-SCNC: 13 MMOL/L — SIGNIFICANT CHANGE UP (ref 5–17)
AST SERPL-CCNC: 239 U/L — HIGH (ref 10–40)
BILIRUB SERPL-MCNC: 3.6 MG/DL — HIGH (ref 0.2–1.2)
BUN SERPL-MCNC: 71 MG/DL — HIGH (ref 7–23)
CALCIUM SERPL-MCNC: 8.1 MG/DL — LOW (ref 8.4–10.5)
CHLORIDE SERPL-SCNC: 99 MMOL/L — SIGNIFICANT CHANGE UP (ref 96–108)
CO2 SERPL-SCNC: 22 MMOL/L — SIGNIFICANT CHANGE UP (ref 22–31)
CREAT SERPL-MCNC: 2.33 MG/DL — HIGH (ref 0.5–1.3)
EGFR: 28 ML/MIN/1.73M2 — LOW
GLUCOSE SERPL-MCNC: 96 MG/DL — SIGNIFICANT CHANGE UP (ref 70–99)
HCT VFR BLD CALC: 35.2 % — LOW (ref 39–50)
HGB BLD-MCNC: 12 G/DL — LOW (ref 13–17)
MAGNESIUM SERPL-MCNC: 2 MG/DL — SIGNIFICANT CHANGE UP (ref 1.6–2.6)
MCHC RBC-ENTMCNC: 30.8 PG — SIGNIFICANT CHANGE UP (ref 27–34)
MCHC RBC-ENTMCNC: 34.1 GM/DL — SIGNIFICANT CHANGE UP (ref 32–36)
MCV RBC AUTO: 90.3 FL — SIGNIFICANT CHANGE UP (ref 80–100)
NRBC # BLD: 0 /100 WBCS — SIGNIFICANT CHANGE UP (ref 0–0)
PHOSPHATE SERPL-MCNC: 3.6 MG/DL — SIGNIFICANT CHANGE UP (ref 2.5–4.5)
PLATELET # BLD AUTO: 89 K/UL — LOW (ref 150–400)
POTASSIUM SERPL-MCNC: 4 MMOL/L — SIGNIFICANT CHANGE UP (ref 3.5–5.3)
POTASSIUM SERPL-SCNC: 4 MMOL/L — SIGNIFICANT CHANGE UP (ref 3.5–5.3)
PROT SERPL-MCNC: 7 G/DL — SIGNIFICANT CHANGE UP (ref 6–8.3)
RBC # BLD: 3.9 M/UL — LOW (ref 4.2–5.8)
RBC # FLD: 16.9 % — HIGH (ref 10.3–14.5)
SODIUM SERPL-SCNC: 134 MMOL/L — LOW (ref 135–145)
VANCOMYCIN FLD-MCNC: 7.6 UG/ML — SIGNIFICANT CHANGE UP
WBC # BLD: 5.27 K/UL — SIGNIFICANT CHANGE UP (ref 3.8–10.5)
WBC # FLD AUTO: 5.27 K/UL — SIGNIFICANT CHANGE UP (ref 3.8–10.5)

## 2024-06-11 PROCEDURE — 71045 X-RAY EXAM CHEST 1 VIEW: CPT | Mod: 26

## 2024-06-11 PROCEDURE — 99221 1ST HOSP IP/OBS SF/LOW 40: CPT

## 2024-06-11 PROCEDURE — 99232 SBSQ HOSP IP/OBS MODERATE 35: CPT

## 2024-06-11 RX ORDER — ALPRAZOLAM 0.25 MG
0.12 TABLET ORAL AT BEDTIME
Refills: 0 | Status: DISCONTINUED | OUTPATIENT
Start: 2024-06-11 | End: 2024-06-14

## 2024-06-11 RX ORDER — DOBUTAMINE HCL 250MG/20ML
3 VIAL (ML) INTRAVENOUS
Qty: 500 | Refills: 0 | Status: DISCONTINUED | OUTPATIENT
Start: 2024-06-11 | End: 2024-06-11

## 2024-06-11 RX ORDER — DOBUTAMINE HCL 250MG/20ML
3 VIAL (ML) INTRAVENOUS
Qty: 1000 | Refills: 0 | Status: DISCONTINUED | OUTPATIENT
Start: 2024-06-11 | End: 2024-06-14

## 2024-06-11 RX ORDER — VANCOMYCIN HCL 1 G
1000 VIAL (EA) INTRAVENOUS ONCE
Refills: 0 | Status: COMPLETED | OUTPATIENT
Start: 2024-06-11 | End: 2024-06-11

## 2024-06-11 RX ORDER — VANCOMYCIN HCL 1 G
VIAL (EA) INTRAVENOUS
Refills: 0 | Status: COMPLETED | OUTPATIENT
Start: 2024-06-11 | End: 2024-06-15

## 2024-06-11 RX ORDER — OXYMETAZOLINE HYDROCHLORIDE 0.5 MG/ML
2 SPRAY NASAL EVERY 12 HOURS
Refills: 0 | Status: DISCONTINUED | OUTPATIENT
Start: 2024-06-11 | End: 2024-06-14

## 2024-06-11 RX ORDER — VANCOMYCIN HCL 1 G
1000 VIAL (EA) INTRAVENOUS EVERY 24 HOURS
Refills: 0 | Status: COMPLETED | OUTPATIENT
Start: 2024-06-12 | End: 2024-06-14

## 2024-06-11 RX ADMIN — Medication 650 MILLIGRAM(S): at 05:31

## 2024-06-11 RX ADMIN — Medication 20 MILLIGRAM(S): at 08:52

## 2024-06-11 RX ADMIN — PANTOPRAZOLE SODIUM 40 MILLIGRAM(S): 20 TABLET, DELAYED RELEASE ORAL at 05:31

## 2024-06-11 RX ADMIN — Medication 0.12 MILLIGRAM(S): at 21:45

## 2024-06-11 RX ADMIN — OXYMETAZOLINE HYDROCHLORIDE 2 SPRAY(S): 0.5 SPRAY NASAL at 08:52

## 2024-06-11 RX ADMIN — Medication 4.21 MICROGRAM(S)/KG/MIN: at 14:12

## 2024-06-11 RX ADMIN — BUMETANIDE 1 MILLIGRAM(S): 0.25 INJECTION INTRAMUSCULAR; INTRAVENOUS at 05:30

## 2024-06-11 RX ADMIN — HEPARIN SODIUM 5000 UNIT(S): 5000 INJECTION INTRAVENOUS; SUBCUTANEOUS at 05:31

## 2024-06-11 RX ADMIN — Medication 3 MILLIGRAM(S): at 23:30

## 2024-06-11 RX ADMIN — Medication 1000 MILLIGRAM(S): at 00:00

## 2024-06-11 RX ADMIN — TAMSULOSIN HYDROCHLORIDE 0.8 MILLIGRAM(S): 0.4 CAPSULE ORAL at 21:45

## 2024-06-11 RX ADMIN — Medication 81 MILLIGRAM(S): at 12:40

## 2024-06-11 RX ADMIN — BUMETANIDE 1 MILLIGRAM(S): 0.25 INJECTION INTRAMUSCULAR; INTRAVENOUS at 14:12

## 2024-06-11 RX ADMIN — Medication 3 MILLIGRAM(S): at 00:22

## 2024-06-11 RX ADMIN — Medication 650 MILLIGRAM(S): at 21:45

## 2024-06-11 RX ADMIN — CHLORHEXIDINE GLUCONATE 1 APPLICATION(S): 213 SOLUTION TOPICAL at 05:30

## 2024-06-11 RX ADMIN — CLOPIDOGREL BISULFATE 75 MILLIGRAM(S): 75 TABLET, FILM COATED ORAL at 12:41

## 2024-06-11 RX ADMIN — ROSUVASTATIN CALCIUM 20 MILLIGRAM(S): 5 TABLET ORAL at 21:44

## 2024-06-11 RX ADMIN — Medication 650 MILLIGRAM(S): at 14:13

## 2024-06-11 NOTE — PROGRESS NOTE ADULT - PROBLEM SELECTOR PLAN 1
Acute decompensated heart failure/cardiogenic shock requiring dobutamine drip.  Patient with likely progression of lung cancer, never treated with systemic therapy, only with RT.  No plans for chemotherapy at this time; focus of care on cardiac condition.  Hematologic profile stable.

## 2024-06-11 NOTE — CHART NOTE - NSCHARTNOTEFT_GEN_A_CORE
Discussed with Adv HF Dr Shell and Dr Singh- recommends to dc pt on dobutamine at 3mcg/kg with titration done at followup appointments. pt follows with Dr Shell and recommend setting up followup appt prior to DC. D/w primary team    Cathy SCHUMACHER-BC

## 2024-06-11 NOTE — CONSULT NOTE ADULT - SUBJECTIVE AND OBJECTIVE BOX
Patient is a 79y old  Male who presents with a chief complaint of hypotension, elevated trop (11 Jun 2024 09:02)      HPI:  79M PMH CAD, combined systolic and diastolic HF with EF 25-30% (Echo 4/2024), valvular disease), CKD, BPH, anxiety/depression/bipolar disorder, h/o lung cancer s/p RUL lobectomy (9/2022) with recurrent disease s/p radiation to left lung (completed 3/2023), recent admission on 5/22-5/25 and on 5/28-5/30 for acute decompensated heart failure. Pt was BIBEMS to  ED due to lethargy and hypotension tonight after taking temazepam, Prozac, flomax and Coreg. Of note, Pt had syncope event on 5/31 from a sitting position he fell forward and hit his chin and Lt arm, which are bruised. Pt accompanied by wife, who contributed most of the history, she state Pt has been slower and had decreased appetite in the last week after falling. Pt saw Dr. Walden today and Coreg was reduced by half but Pt has not started on regimen. Denies any further falls or head trauma. Denies fever, chills, chest pain, abdominal pain, vomiting, diarrhea or dysuria.     In the ED, VS T 97.1F, HR 59, BP 86/58, RR 18, SpO2 98% on NC. As per ED provider, Pt was lethargic with pinpoint pupils, given Narcan and he woke up and became more alert. Labs pertinent for Na 131, Cr 3.29, , bilirubin 3.4, Trop 105.4, Alk phos 444, , , Mg 2.8, Phosp 6.3, and BNP 5254. CT Head neg, CXR no effusion/PNA. Pt was given midodrine 10mg with improvement of /72.     (06 Jun 2024 02:17)      Interval Events:  Called to see and evaluate Mr. Becerra for epistaxis.  He is seen in his room with his wife at bedside who reports that this is the second episode of epistaxis noted that it is likely due to digital manipulation.  The current epistaxis event occurred this morning and lasted for about an hour until it was controlled with Afrin and nasal pressure.  He denies any pain, no congestion, currently without any bleeding.    PAST MEDICAL & SURGICAL HISTORY:  BPH (benign prostatic hyperplasia)      Bipolar disorder      Depression      Anxiety      Umbilical hernia, incarcerated      Constipation      Urinary retention      CAD (coronary artery disease)      SCC (squamous cell carcinoma)      Lung mass      CHF, chronic      Heart failure with reduced ejection fraction      History of arthroscopy of knee  Right, 1987      History of tonsillectomy  1952      History of hernia repair      H/O coronary angiogram          Allergies    No Known Allergies      Social History:  Lives at home with wife (06 Jun 2024 02:17)      FAMILY HISTORY:  Family history of depression (Mother)    FH: CAD (coronary artery disease) (Sibling, Sibling)    Family history of diabetes mellitus (DM) (Sibling)        REVIEW OF SYSTEMS:     CONSTITUTIONAL: No weakness, fevers or chills     EYES/ENT: No visual changes;  No vertigo or throat pain, +epistaxis     NECK: No pain or stiffness     RESPIRATORY: No cough, wheezing, hemoptysis; No shortness of breath     CARDIOVASCULAR: No chest pain or palpitations     GASTROINTESTINAL: No abdominal or epigastric pain. No nausea, vomiting, or hematemesis; No diarrhea or constipation. No melena or hematochezia.     GENITOURINARY: No dysuria, frequency or hematuria     NEUROLOGICAL: No numbness or weakness     SKIN: No itching, burning, rashes, or lesions   All other review of systems is negative unless indicated above.    MEDICATIONS  (STANDING):  aspirin enteric coated 81 milliGRAM(s) Oral daily  buMETAnide Injectable 1 milliGRAM(s) IV Push two times a day  chlorhexidine 2% Cloths 1 Application(s) Topical <User Schedule>  clopidogrel Tablet 75 milliGRAM(s) Oral daily  DOBUTamine Infusion 3 MICROgram(s)/kG/Min (4.21 mL/Hr) IV Continuous <Continuous>  FLUoxetine 20 milliGRAM(s) Oral <User Schedule>  pantoprazole    Tablet 40 milliGRAM(s) Oral before breakfast  rosuvastatin 20 milliGRAM(s) Oral at bedtime  sodium bicarbonate 650 milliGRAM(s) Oral three times a day  tamsulosin 0.8 milliGRAM(s) Oral at bedtime  vancomycin  IVPB        MEDICATIONS  (PRN):  acetaminophen     Tablet .. 650 milliGRAM(s) Oral every 6 hours PRN Temp greater or equal to 38C (100.4F), Mild Pain (1 - 3)  aluminum hydroxide/magnesium hydroxide/simethicone Suspension 30 milliLiter(s) Oral every 4 hours PRN Dyspepsia  melatonin 3 milliGRAM(s) Oral at bedtime PRN Insomnia  ondansetron Injectable 4 milliGRAM(s) IV Push every 8 hours PRN Nausea and/or Vomiting  oxymetazoline 0.05% Nasal Spray 2 Spray(s) Both Nostrils every 12 hours PRN epistatixis  traZODone 25 milliGRAM(s) Oral at bedtime PRN Insomnia                            12.0   5.27  )-----------( 89       ( 11 Jun 2024 05:00 )             35.2     06-11    134<L>  |  99  |  71<H>  ----------------------------<  96  4.0   |  22  |  2.33<H>    Ca    8.1<L>      11 Jun 2024 05:00  Phos  3.6     06-11  Mg     2.0     06-11    TPro  7.0  /  Alb  2.8<L>  /  TBili  3.6<H>  /  DBili  x   /  AST  239<H>  /  ALT  174<H>  /  AlkPhos  528<H>  06-11    I&O's Detail    10 Alirio 2024 07:01  -  11 Jun 2024 07:00  --------------------------------------------------------  IN:  Total IN: 0 mL    OUT:    Voided (mL): 1350 mL  Total OUT: 1350 mL    Total NET: -1350 mL      11 Jun 2024 07:01  -  11 Jun 2024 18:08  --------------------------------------------------------  IN:    DOBUTamine: 20 mL  Total IN: 20 mL    OUT:    Voided (mL): 1300 mL  Total OUT: 1300 mL    Total NET: -1280 mL        Vital Signs Last 24 Hrs  T(C): 37.2 (11 Jun 2024 09:00), Max: 37.2 (11 Jun 2024 09:00)  T(F): 98.9 (11 Jun 2024 09:00), Max: 98.9 (11 Jun 2024 09:00)  HR: 89 (11 Jun 2024 17:00) (71 - 89)  BP: 102/60 (11 Jun 2024 17:00) (84/63 - 112/69)  BP(mean): 72 (11 Jun 2024 17:00) (64 - 87)  RR: 16 (11 Jun 2024 17:00) (10 - 23)  SpO2: 98% (11 Jun 2024 17:00) (95% - 100%)    Parameters below as of 11 Jun 2024 17:00  Patient On (Oxygen Delivery Method): room air      CBC Full  -  ( 11 Jun 2024 05:00 )  WBC Count : 5.27 K/uL  RBC Count : 3.90 M/uL  Hemoglobin : 12.0 g/dL  Hematocrit : 35.2 %  Platelet Count - Automated : 89 K/uL  Mean Cell Volume : 90.3 fl  Mean Cell Hemoglobin : 30.8 pg  Mean Cell Hemoglobin Concentration : 34.1 gm/dL  Auto Neutrophil # : x  Auto Lymphocyte # : x  Auto Monocyte # : x  Auto Eosinophil # : x  Auto Basophil # : x  Auto Neutrophil % : x  Auto Lymphocyte % : x  Auto Monocyte % : x  Auto Eosinophil % : x  Auto Basophil % : x        PHYSICAL EXAM:     Constitutional Normal: well nourished, well developed, non-dysmorphic, no acute distress     Psychiatric: age appropriate behavior, cooperative     Skin: no obvious skin lesions     Head: Normocephalic, Atraumatic     Lymphatic: no cervical lymphadenopathy     ENT:        External Ear:  Normal, without any obvious lesions        External Nose:  Normal, no structural deformities		        Anterior Nasal Cavity: Dry mucosa, no turbinate hypertrophy, +dried blood in both nasal cavities, no active bleeding.     Oral Cavity:  Good dentition, tongue midline, no lesions or ulcerations, uvula midline     Neck: No palpable lymphadenopathy     Pulmonary: No Acute Distress.      CV: no peripheral edema/cyanosis     GI: nondistended, nontender     Peripheral vascular: no JVD or edema     Neurologic: awake and alert, no obvious facial weakness

## 2024-06-11 NOTE — PROGRESS NOTE ADULT - SUBJECTIVE AND OBJECTIVE BOX
Seen in ICU, comfortable on RA    Vital Signs Last 24 Hrs  T(C): 36.7 (24 @ 17:00), Max: 37.2 (24 @ 09:00)  T(F): 98.1 (24 @ 17:00), Max: 98.9 (24 @ 09:00)  HR: 85 (24 @ 22:00) (71 - 89)  BP: 104/61 (24 @ 22:00) (84/63 - 112/69)  BP(mean): 75 (24 @ 22:00) (64 - 87)  RR: 20 (24 @ 22:00) (10 - 23)  SpO2: 100% (24 @ 22:00) (95% - 100%)    I&O's Detail    10 Alirio 2024 07:  -  2024 07:00  --------------------------------------------------------  OUT:    Voided (mL): 1350 mL  Total OUT: 1350 mL    2024 07:  -  2024 22:47  --------------------------------------------------------  IN:    DOBUTamine: 40 mL  Total IN: 40 mL    OUT:    Voided (mL): 1800 mL  Total OUT: 1800 mL    s1s2  b/l air entry  soft, NT  sm edema                                       12.0   5.27  )-----------( 89       ( 2024 05:00 )             35.2     2024 05:00    134    |  99     |  71     ----------------------------<  96     4.0     |  22     |  2.33     Ca    8.1        2024 05:00  Phos  3.6       2024 05:00  Mg     2.0       2024 05:00    TPro  7.0    /  Alb  2.8    /  TBili  3.6    /  DBili  x      /  AST  239    /  ALT  174    /  AlkPhos  528    2024 05:00    LIVER FUNCTIONS - ( 2024 05:00 )  Alb: 2.8 g/dL / Pro: 7.0 g/dL / ALK PHOS: 528 U/L / ALT: 174 U/L / AST: 239 U/L / GGT: x           acetaminophen     Tablet .. 650 milliGRAM(s) Oral every 6 hours PRN  ALPRAZolam 0.125 milliGRAM(s) Oral at bedtime PRN  aluminum hydroxide/magnesium hydroxide/simethicone Suspension 30 milliLiter(s) Oral every 4 hours PRN  aspirin enteric coated 81 milliGRAM(s) Oral daily  buMETAnide Injectable 1 milliGRAM(s) IV Push two times a day  chlorhexidine 2% Cloths 1 Application(s) Topical <User Schedule>  clopidogrel Tablet 75 milliGRAM(s) Oral daily  DOBUTamine Infusion 3 MICROgram(s)/kG/Min IV Continuous <Continuous>  FLUoxetine 20 milliGRAM(s) Oral <User Schedule>  melatonin 3 milliGRAM(s) Oral at bedtime PRN  ondansetron Injectable 4 milliGRAM(s) IV Push every 8 hours PRN  oxymetazoline 0.05% Nasal Spray 2 Spray(s) Both Nostrils every 12 hours PRN  pantoprazole    Tablet 40 milliGRAM(s) Oral before breakfast  rosuvastatin 20 milliGRAM(s) Oral at bedtime  sodium bicarbonate 650 milliGRAM(s) Oral three times a day  tamsulosin 0.8 milliGRAM(s) Oral at bedtime  vancomycin  IVPB        A/P:    CM, EF < 20%, lung ca, mod MR, mod-severe TR  Cardio-renal BRODY/CKD 3 (Cr 2.14 - 24)   Hypervolemic hyponatremia iso BRODY/CKD, CHF, malignancy   CT A/P w/o hydro 24  UA negative 24  Resp distress iso CM, lung ca  V/Q scan w/low prob for PE 24  Tx to ICU, improved w/ qtt  Cr is improving   Continue Bumex per ICU team   Avoid nephrotoxins, no NSAID's  F/u Vanco level  D/w family at bedside      [phone]

## 2024-06-11 NOTE — PROGRESS NOTE ADULT - SUBJECTIVE AND OBJECTIVE BOX
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  Patient was in the sitting position when he fell forward and hit his chin and left arm, with noticeable bruising on physical examination.  Of note, CT chest in early April 2024 showed left upper lobe nodule within the radiation field of more rounded contour, gradually becoming more discrete compared to prior studies.  Adjustment in cardiac medication done by cardiology (Dr. Walden reduced Coreg by 50%).  Oncology consulted in light of evidence of POD on CT chest.    MEDICATIONS:  acetaminophen     Tablet .. 650 milliGRAM(s) Oral every 6 hours PRN  aluminum hydroxide/magnesium hydroxide/simethicone Suspension 30 milliLiter(s) Oral every 4 hours PRN  aspirin enteric coated 81 milliGRAM(s) Oral daily  buMETAnide Injectable 1 milliGRAM(s) IV Push two times a day  chlorhexidine 2% Cloths 1 Application(s) Topical <User Schedule>  clopidogrel Tablet 75 milliGRAM(s) Oral daily  FLUoxetine 20 milliGRAM(s) Oral <User Schedule>  heparin   Injectable 5000 Unit(s) SubCutaneous every 12 hours  melatonin 3 milliGRAM(s) Oral at bedtime PRN  ondansetron Injectable 4 milliGRAM(s) IV Push every 8 hours PRN  pantoprazole    Tablet 40 milliGRAM(s) Oral before breakfast  rosuvastatin 20 milliGRAM(s) Oral at bedtime  sodium bicarbonate 650 milliGRAM(s) Oral three times a day  tamsulosin 0.8 milliGRAM(s) Oral at bedtime  traZODone 25 milliGRAM(s) Oral at bedtime PRN  vancomycin  IVPB 1000 milliGRAM(s) IV Intermittent once    All other medications reviewed.    SUBJECTIVE:  Alert, reports no chest pain.  Remains off dobutamine infusion with normal BP    VITALS:  T(C): 37 (06-10-24 @ 05:00), Max: 37 (06-10-24 @ 05:00)  T(F): 98.6 (06-10-24 @ 05:00), Max: 98.6 (06-10-24 @ 05:00)  HR: 68 (06-10-24 @ 06:00) (64 - 76)  BP: 100/64 (06-10-24 @ 06:00) (81/63 - 111/68)      PHYSICAL EXAM:  Constitutional: alert, well developed  HEENT: normocephalic, anicteric sclerae, no mucositis or thrush  Respiratory: bilateral clear to auscultation anteriorly  Cardiovascular : S1, S2 regular, rhythmic, no murmurs, gallops or rubs  Abdomen: soft, distended, + normoactive BS, no palpable HS- megaly  Extremities: no tenderness;  -c/c/e, pulses equal bilaterally    LABS:  (06-10) WBC: 4.73 K/uL,Hemoglobin: 11.7 g/dL, Hematocrit: 33.8 %,  Platelet: 95 K/uL  (06-10) Na: 132 mmol/L ; K: 3.5 mmol/L ; BUN: 76 mg/dL ; Cr: 2.48 mg/dL.    RADIOLOGY:  ACC: [id number] EXAM:  CT BRAIN   ORDERED BY: CATHI MONTES     PROCEDURE DATE:  06/06/2024          INTERPRETATION:  NONCONTRAST CT OF THE BRAIN DATED 6/6/2024.    CLINICAL INFORMATION:  Head injury.    TECHNIQUE: Axial CT images are obtained from the cranial vertex to the   skull base without the administration of IV contrast. Images are   reformatted in sagittal and coronal planes.    The study is correlated with an MRI of the brain from 2/1/2023 and PET/CT   from 5/5/2024..    FINDINGS:    There is no acute intra-axial or extra-axial hemorrhage. There are dense   calcifications in the left basal ganglia which are seen on prior PET CTs.   There is no mass effect or shift of the midline. The basal cisterns are   not effaced. There is cerebral and cerebellar volume loss with prominence   of the ventricles, sulci, and cerebellar folia. There are mild chronic   ischemic changes in the frontoparietal white matter. There are   atherosclerotic calcifications of the intracranial carotid arteries.    There is no significant scalp soft tissue swelling or scalp hematoma. The   skull base and bony calvarium are intact. The visualized paranasal   sinuses and tympanic/mastoid cavities are clear apart from minimal   bilateral ethmoid mucosal thickening and a left middle ear and mastoid   effusion.    IMPRESSION:  No acute intracranial hemorrhage, mass effect, or acute osseous fracture.  Dense calcifications in the left basal ganglia unchanged compared to prior PET CTs.  Left middleear and mastoid effusion. Correlate with clinical exam.

## 2024-06-11 NOTE — CONSULT NOTE ADULT - CONSULT REASON
hypotension, lethargy, elevated trop
Decompensated heart failure
lung cancer
BRODY/CKD
to assist with GOC
epistaxis

## 2024-06-11 NOTE — CONSULT NOTE ADULT - REASON FOR ADMISSION
Hypotension, elevated trop

## 2024-06-11 NOTE — CONSULT NOTE ADULT - PROBLEM SELECTOR RECOMMENDATION 9
-  Currently without any active bleeding.  -  Saline spray to both nostrils 4x daily  -  Contact ENT if epistaxis recurs for cauterization.

## 2024-06-11 NOTE — CONSULT NOTE ADULT - CONSULT REQUESTED DATE/TIME
06-Jun-2024 13:53
06-Jun-2024 11:44
07-Jun-2024
07-Jun-2024 10:00
11-Jun-2024 18:08
06-Jun-2024 12:17

## 2024-06-11 NOTE — PROGRESS NOTE ADULT - SUBJECTIVE AND OBJECTIVE BOX
ELYSE MALAVE  [id number]      Chief Complaint: End stage heart failure/Lung cancer    Interval History: The patient reports breathing is stable and urinating well.     Tele: sinus 70s BPM      Current meds:   acetaminophen     Tablet .. 650 milliGRAM(s) Oral every 6 hours PRN  aluminum hydroxide/magnesium hydroxide/simethicone Suspension 30 milliLiter(s) Oral every 4 hours PRN  aspirin enteric coated 81 milliGRAM(s) Oral daily  buMETAnide Injectable 1 milliGRAM(s) IV Push two times a day  chlorhexidine 2% Cloths 1 Application(s) Topical <User Schedule>  clopidogrel Tablet 75 milliGRAM(s) Oral daily  FLUoxetine 20 milliGRAM(s) Oral <User Schedule>  heparin   Injectable 5000 Unit(s) SubCutaneous every 12 hours  melatonin 3 milliGRAM(s) Oral at bedtime PRN  ondansetron Injectable 4 milliGRAM(s) IV Push every 8 hours PRN  oxymetazoline 0.05% Nasal Spray 2 Spray(s) Both Nostrils every 12 hours PRN  pantoprazole    Tablet 40 milliGRAM(s) Oral before breakfast  rosuvastatin 20 milliGRAM(s) Oral at bedtime  sodium bicarbonate 650 milliGRAM(s) Oral three times a day  tamsulosin 0.8 milliGRAM(s) Oral at bedtime  traZODone 25 milliGRAM(s) Oral at bedtime PRN      Objective:     Vital Signs:   T(C): 37.1 (06-11-24 @ 05:00), Max: 37.1 (06-11-24 @ 05:00)  HR: 77 (06-11-24 @ 08:00) (67 - 86)  BP: 84/73 (06-11-24 @ 08:00) (84/73 - 107/61)  RR: 17 (06-11-24 @ 08:00) (8 - 23)  SpO2: 100% (06-11-24 @ 08:00) (96% - 100%)  Wt(kg): --      Physical Exam:   General: no acute distress  HEENT: sclera anicteric  Neck: supple  CVS: JVP ~ 7 cm H20, RRR, s1, s2, systolic murmur  Chest: unlabored respirations, CTAB  Abdomen: non-distended  Extremities: no lower extremity edema b/l  Neuro: awake, alert & oriented  Psych: normal affect    Labs:   11 Jun 2024 05:00    134    |  99     |  71     ----------------------------<  96     4.0     |  22     |  2.33     Ca    8.1        11 Jun 2024 05:00  Phos  3.6       11 Jun 2024 05:00  Mg     2.0       11 Jun 2024 05:00    TPro  7.0    /  Alb  2.8    /  TBili  3.6    /  DBili  x      /  AST  239    /  ALT  174    /  AlkPhos  528    11 Jun 2024 05:00                          12.0   5.27  )-----------( 89       ( 11 Jun 2024 05:00 )             35.2           TTE (4/7/24):  LVEF 25-30%, mild LVH  Mildly enlarged right ventricle.  Mild-moderate MR, Moderate TR, Mild AR, Mild PA    TTE (6/6/24):  LVEF < 20%  Severely enlarged RV with severely reduced RV systolic function  RV pressure/volume overload  Moderate MR, Moderate-severe TR, Mild AR  Mild pulmonary hypertension       ECG (6/6/24): sinus bradycardia, first degree AV block, RBBB, anteroseptal/anterior infarct (similar to prior ECG)

## 2024-06-11 NOTE — PROGRESS NOTE ADULT - ASSESSMENT
Physical Examination:  General: Alert, oriented, interactive, nonfocal.  HEENT: PERRL. No bleeding noted anteriorly in nares at time of evaluation, though noted scant blood in posterior oropharynx  NECK: Supple.   PULM: Clear to auscultation bilaterally.  CVS: s1/s2. No murmurs   ABD: Soft, nondistended, nontender, normoactive bowel sounds.  EXT: No edema, nontender.  SKIN: Warm. + right forearm IV site phlebitis     79 year old DNR/DNI male with PMH CAD, systolic HF with EF 25-30%, right heart failure, valvular disease, CKD, BPH, anxiety/depression/bipolar disorder, lung cancer presenting with acute decompensated heart failure. ICU consulted for inotropic support for acute heart failure.     Assessment  1. Acute decompensation of heart failure     not a candidate for RVAD / LVAD based on discussion with cardio  2. CKD   3. CAD  4. Lung cancer in non smoker with new lung nodules  Underlying BPH, Bipolar d/o, Depression, Anxiety, CAD (coronary artery disease)    Plan:  Monitor off dobutamine infusion   Close hemodynamic monitoring  diuresis and aim for negative fluid balance - cont. bumex 1 mg BID  Supplement potassium   monitor labs see if renal function and liver function improves  n/c o2  over all prognosis is poor  Cardiology and Nephrology follow up   Voiding independently  ENT consult for epistaxis  Afrin nasally   Noted with evidence of superficial phlebitis at previous IV site, will initiate Vancomycin for 5 days total  Palliative care following, refused hospice care   [facility] DNR/I.   ICU care for today

## 2024-06-11 NOTE — PROGRESS NOTE ADULT - ASSESSMENT
Assessment:  John Becerra is a 79 year old man with past medical history of Bipolar disorder, Coronary artery disease (history of STEMI in 2022, with multivessel CAD with partial viability on nuclear), HFrEF (LVEF 35% in May 2022), and Lung cancer (s/p radiation and lobectomy) with recent hospitalization for acute on chronic systolic heart failure now presents with hypotension and lethargy, found to have signs of end stage heart failure and cardiorenal syndrome.     ECG on admission consistent with sinus bradycardia, RBBB and old anteroseptal/anterior infarct, similar to prior ECG. Troponins elevated and have peaked, likely secondary to renal disease. Echo report with LVEF < 20%, severely reduced RV systolic function, moderate-severe tricuspid regurgitation. Prior coronary angiogram from 2022 with normal left main, 70% stenosis of LAD, 70% stenosis of first diagonal, normal LCx and 100% stenosis of RCA that was medically managed after viability testing indicated mostly infarcted tissue.    Recommendations:  [] End stage heart failure: As per discussion with Dr. Ardon and per chart review it appears that cardiologist, Dr. Oh had discussion with oncologist and patient's heart failure physician, Dr. Jay Shell and patient was deemed not to be a candidate for advanced therapies based on his lung cancer and renal disease and they recommend Dobutamine infusion for palliative support. Palliative care evaluated patient and no interest in home hospice.  In regards to guideline directed medical therapy due to CKD with a GFR of 26, the patient is likely not an ideal candidate for Entresto, Farxiga and Spironolactone at this time. Continue diuresis as tolerated.     ICU team held Dobutamine, can monitor off Dobutamine today, will reach out to patient's heart failure cardiologist regarding next steps.     We will continue to follow along.    Adam Gilliam MD  Cardiology

## 2024-06-11 NOTE — PROGRESS NOTE ADULT - SUBJECTIVE AND OBJECTIVE BOX
Follow-up Pulmonary Progress Note  Chief Complaint : Acute renal failure    No new events overnight.  Denies SOB/CP. Endorsing epistaxis this morning. States has history of intermittent epistaxis.      Allergies :No Known Allergies      PAST MEDICAL & SURGICAL HISTORY:  BPH (benign prostatic hyperplasia)    Bipolar disorder    Depression    Anxiety    Umbilical hernia, incarcerated    Constipation    Urinary retention    CAD (coronary artery disease)    SCC (squamous cell carcinoma)    Lung mass    CHF, chronic    Heart failure with reduced ejection fraction    History of arthroscopy of knee  Right, 1987    History of tonsillectomy  1952    History of hernia repair    H/O coronary angiogram        Medications:  MEDICATIONS  (STANDING):  aspirin enteric coated 81 milliGRAM(s) Oral daily  buMETAnide Injectable 1 milliGRAM(s) IV Push two times a day  chlorhexidine 2% Cloths 1 Application(s) Topical <User Schedule>  clopidogrel Tablet 75 milliGRAM(s) Oral daily  FLUoxetine 20 milliGRAM(s) Oral <User Schedule>  heparin   Injectable 5000 Unit(s) SubCutaneous every 12 hours  pantoprazole    Tablet 40 milliGRAM(s) Oral before breakfast  rosuvastatin 20 milliGRAM(s) Oral at bedtime  sodium bicarbonate 650 milliGRAM(s) Oral three times a day  tamsulosin 0.8 milliGRAM(s) Oral at bedtime    MEDICATIONS  (PRN):  acetaminophen     Tablet .. 650 milliGRAM(s) Oral every 6 hours PRN Temp greater or equal to 38C (100.4F), Mild Pain (1 - 3)  aluminum hydroxide/magnesium hydroxide/simethicone Suspension 30 milliLiter(s) Oral every 4 hours PRN Dyspepsia  melatonin 3 milliGRAM(s) Oral at bedtime PRN Insomnia  ondansetron Injectable 4 milliGRAM(s) IV Push every 8 hours PRN Nausea and/or Vomiting  oxymetazoline 0.05% Nasal Spray 2 Spray(s) Both Nostrils every 12 hours PRN epistatixis  traZODone 25 milliGRAM(s) Oral at bedtime PRN Insomnia      Antibiotics History  vancomycin  IVPB 1000 milliGRAM(s) IV Intermittent once, 06-10-24 @ 10:22, Stop order after: 1 Doses      Heme Medications   aspirin enteric coated 81 milliGRAM(s) Oral daily, 06-06-24 @ 03:30  clopidogrel Tablet 75 milliGRAM(s) Oral daily, 06-06-24 @ 03:34  heparin   Injectable 5000 Unit(s) SubCutaneous every 12 hours, 06-07-24 @ 15:12      GI Medications  aluminum hydroxide/magnesium hydroxide/simethicone Suspension 30 milliLiter(s) Oral every 4 hours, 06-06-24 @ 02:58, Routine PRN  pantoprazole    Tablet 40 milliGRAM(s) Oral before breakfast, 06-07-24 @ 15:12, Routine        LABS:                        12.0   5.27  )-----------( 89       ( 11 Jun 2024 05:00 )             35.2     06-11    134<L>  |  99  |  71<H>  ----------------------------<  96  4.0   |  22  |  2.33<H>    Ca    8.1<L>      11 Jun 2024 05:00  Phos  3.6     06-11  Mg     2.0     06-11    TPro  7.0  /  Alb  2.8<L>  /  TBili  3.6<H>  /  DBili  x   /  AST  239<H>  /  ALT  174<H>  /  AlkPhos  528<H>  06-11    ADRIA Negative 05-24 @ 13:55  Anti SS-1 --  Anti SS-2 --  Anti RNP --  RF -- 05-24 @ 13:55    Atypical ANCA -- 05-24 @ 13:55  c-ANCA titer -- 05-24 @ 13:55  c-ANCA -- 05-24 @ 13:55  p-ANCA -- 05-24 @ 13:55            Urinalysis Basic - ( 11 Jun 2024 05:00 )    Color: x / Appearance: x / SG: x / pH: x  Gluc: 96 mg/dL / Ketone: x  / Bili: x / Urobili: x   Blood: x / Protein: x / Nitrite: x   Leuk Esterase: x / RBC: x / WBC x   Sq Epi: x / Non Sq Epi: x / Bacteria: x              CULTURES: (if applicable)          CAPILLARY BLOOD GLUCOSE          RADIOLOGY  CXR:      CT:    ECHO:      VITALS:  T(C): 37.1 (06-11-24 @ 05:00), Max: 37.1 (06-11-24 @ 05:00)  T(F): 98.8 (06-11-24 @ 05:00), Max: 98.8 (06-11-24 @ 05:00)  HR: 77 (06-11-24 @ 08:00) (67 - 86)  BP: 84/73 (06-11-24 @ 08:00) (84/73 - 107/61)  BP(mean): 79 (06-11-24 @ 08:00) (68 - 81)  ABP: --  ABP(mean): --  RR: 17 (06-11-24 @ 08:00) (8 - 23)  SpO2: 100% (06-11-24 @ 08:00) (96% - 100%)  CVP(mm Hg): --  CVP(cm H2O): --    Ins and Outs     06-10-24 @ 07:01  -  06-11-24 @ 07:00  --------------------------------------------------------  IN: 0 mL / OUT: 1350 mL / NET: -1350 mL                I&O's Detail    10 Alirio 2024 07:01  -  11 Jun 2024 07:00  --------------------------------------------------------  IN:  Total IN: 0 mL    OUT:    Voided (mL): 1350 mL  Total OUT: 1350 mL    Total NET: -1350 mL

## 2024-06-12 LAB
ALBUMIN SERPL ELPH-MCNC: 2.6 G/DL — LOW (ref 3.3–5)
ALP SERPL-CCNC: 486 U/L — HIGH (ref 40–120)
ALT FLD-CCNC: 142 U/L — HIGH (ref 10–45)
ANION GAP SERPL CALC-SCNC: 13 MMOL/L — SIGNIFICANT CHANGE UP (ref 5–17)
AST SERPL-CCNC: 166 U/L — HIGH (ref 10–40)
BASOPHILS # BLD AUTO: 0.02 K/UL — SIGNIFICANT CHANGE UP (ref 0–0.2)
BASOPHILS NFR BLD AUTO: 0.4 % — SIGNIFICANT CHANGE UP (ref 0–2)
BILIRUB SERPL-MCNC: 3.1 MG/DL — HIGH (ref 0.2–1.2)
BUN SERPL-MCNC: 66 MG/DL — HIGH (ref 7–23)
CALCIUM SERPL-MCNC: 8.3 MG/DL — LOW (ref 8.4–10.5)
CHLORIDE SERPL-SCNC: 98 MMOL/L — SIGNIFICANT CHANGE UP (ref 96–108)
CO2 SERPL-SCNC: 25 MMOL/L — SIGNIFICANT CHANGE UP (ref 22–31)
CREAT SERPL-MCNC: 2.3 MG/DL — HIGH (ref 0.5–1.3)
EGFR: 28 ML/MIN/1.73M2 — LOW
EOSINOPHIL # BLD AUTO: 0.13 K/UL — SIGNIFICANT CHANGE UP (ref 0–0.5)
EOSINOPHIL NFR BLD AUTO: 2.6 % — SIGNIFICANT CHANGE UP (ref 0–6)
GLUCOSE SERPL-MCNC: 106 MG/DL — HIGH (ref 70–99)
HCT VFR BLD CALC: 33.3 % — LOW (ref 39–50)
HGB BLD-MCNC: 11 G/DL — LOW (ref 13–17)
IMM GRANULOCYTES NFR BLD AUTO: 0.2 % — SIGNIFICANT CHANGE UP (ref 0–0.9)
LYMPHOCYTES # BLD AUTO: 0.77 K/UL — LOW (ref 1–3.3)
LYMPHOCYTES # BLD AUTO: 15.2 % — SIGNIFICANT CHANGE UP (ref 13–44)
MAGNESIUM SERPL-MCNC: 1.9 MG/DL — SIGNIFICANT CHANGE UP (ref 1.6–2.6)
MCHC RBC-ENTMCNC: 30.1 PG — SIGNIFICANT CHANGE UP (ref 27–34)
MCHC RBC-ENTMCNC: 33 GM/DL — SIGNIFICANT CHANGE UP (ref 32–36)
MCV RBC AUTO: 91 FL — SIGNIFICANT CHANGE UP (ref 80–100)
MONOCYTES # BLD AUTO: 1.02 K/UL — HIGH (ref 0–0.9)
MONOCYTES NFR BLD AUTO: 20.1 % — HIGH (ref 2–14)
NEUTROPHILS # BLD AUTO: 3.13 K/UL — SIGNIFICANT CHANGE UP (ref 1.8–7.4)
NEUTROPHILS NFR BLD AUTO: 61.5 % — SIGNIFICANT CHANGE UP (ref 43–77)
NRBC # BLD: 0 /100 WBCS — SIGNIFICANT CHANGE UP (ref 0–0)
PHOSPHATE SERPL-MCNC: 2.9 MG/DL — SIGNIFICANT CHANGE UP (ref 2.5–4.5)
PLATELET # BLD AUTO: 81 K/UL — LOW (ref 150–400)
POTASSIUM SERPL-MCNC: 3.3 MMOL/L — LOW (ref 3.5–5.3)
POTASSIUM SERPL-SCNC: 3.3 MMOL/L — LOW (ref 3.5–5.3)
PROT SERPL-MCNC: 6.6 G/DL — SIGNIFICANT CHANGE UP (ref 6–8.3)
RBC # BLD: 3.66 M/UL — LOW (ref 4.2–5.8)
RBC # FLD: 17 % — HIGH (ref 10.3–14.5)
SODIUM SERPL-SCNC: 136 MMOL/L — SIGNIFICANT CHANGE UP (ref 135–145)
WBC # BLD: 5.08 K/UL — SIGNIFICANT CHANGE UP (ref 3.8–10.5)
WBC # FLD AUTO: 5.08 K/UL — SIGNIFICANT CHANGE UP (ref 3.8–10.5)

## 2024-06-12 PROCEDURE — 99232 SBSQ HOSP IP/OBS MODERATE 35: CPT

## 2024-06-12 RX ORDER — MAGNESIUM SULFATE 500 MG/ML
1 VIAL (ML) INJECTION ONCE
Refills: 0 | Status: COMPLETED | OUTPATIENT
Start: 2024-06-12 | End: 2024-06-12

## 2024-06-12 RX ORDER — POTASSIUM CHLORIDE 20 MEQ
20 PACKET (EA) ORAL EVERY 4 HOURS
Refills: 0 | Status: COMPLETED | OUTPATIENT
Start: 2024-06-12 | End: 2024-06-12

## 2024-06-12 RX ORDER — ZOLPIDEM TARTRATE 10 MG/1
5 TABLET ORAL AT BEDTIME
Refills: 0 | Status: DISCONTINUED | OUTPATIENT
Start: 2024-06-12 | End: 2024-06-14

## 2024-06-12 RX ADMIN — Medication 81 MILLIGRAM(S): at 14:19

## 2024-06-12 RX ADMIN — ROSUVASTATIN CALCIUM 20 MILLIGRAM(S): 5 TABLET ORAL at 21:31

## 2024-06-12 RX ADMIN — Medication 650 MILLIGRAM(S): at 14:20

## 2024-06-12 RX ADMIN — CLOPIDOGREL BISULFATE 75 MILLIGRAM(S): 75 TABLET, FILM COATED ORAL at 11:08

## 2024-06-12 RX ADMIN — Medication 100 GRAM(S): at 08:20

## 2024-06-12 RX ADMIN — Medication 650 MILLIGRAM(S): at 21:31

## 2024-06-12 RX ADMIN — BUMETANIDE 1 MILLIGRAM(S): 0.25 INJECTION INTRAMUSCULAR; INTRAVENOUS at 14:19

## 2024-06-12 RX ADMIN — TAMSULOSIN HYDROCHLORIDE 0.8 MILLIGRAM(S): 0.4 CAPSULE ORAL at 21:31

## 2024-06-12 RX ADMIN — Medication 20 MILLIEQUIVALENT(S): at 09:58

## 2024-06-12 RX ADMIN — CHLORHEXIDINE GLUCONATE 1 APPLICATION(S): 213 SOLUTION TOPICAL at 05:35

## 2024-06-12 RX ADMIN — ZOLPIDEM TARTRATE 5 MILLIGRAM(S): 10 TABLET ORAL at 21:30

## 2024-06-12 RX ADMIN — BUMETANIDE 1 MILLIGRAM(S): 0.25 INJECTION INTRAMUSCULAR; INTRAVENOUS at 05:35

## 2024-06-12 RX ADMIN — Medication 3 MILLIGRAM(S): at 21:31

## 2024-06-12 RX ADMIN — PANTOPRAZOLE SODIUM 40 MILLIGRAM(S): 20 TABLET, DELAYED RELEASE ORAL at 05:35

## 2024-06-12 RX ADMIN — Medication 20 MILLIEQUIVALENT(S): at 14:19

## 2024-06-12 RX ADMIN — Medication 650 MILLIGRAM(S): at 05:35

## 2024-06-12 NOTE — PROGRESS NOTE ADULT - SUBJECTIVE AND OBJECTIVE BOX
Seen in ICU, comfortable on RA    Vital Signs Last 24 Hrs  T(C): 36.7 (24 @ 15:00), Max: 36.7 (24 @ 11:00)  T(F): 98.1 (24 @ 15:00), Max: 98.1 (24 @ 15:00)  HR: 85 (24 @ 21:00) (78 - 89)  BP: 90/49 (24 @ 21:00) (83/67 - 119/61)  BP(mean): 63 (24 @ 21:00) (63 - 90)  RR: 18 (24 @ 19:00) (13 - 20)  SpO2: 100% (24 @ 19:00) (95% - 100%)    I&O's Detail    2024 07:01  -  2024 07:00  --------------------------------------------------------  IN:    DOBUTamine: 64.2 mL  Total IN: 64.2 mL    OUT:    Voided (mL): 2550 mL  Total OUT: 2550 mL    2024 07:01  -  2024 22:41  --------------------------------------------------------  IN:    DOBUTamine: 54.6 mL    IV PiggyBack: 100 mL    Oral Fluid: 1490 mL  Total IN: 1644.6 mL    OUT:    Voided (mL): 1100 mL  Total OUT: 1100 mL    s1s2  b/l air entry  soft, NT  sm edema                                              11.0   5.08  )-----------( 81       ( 2024 05:30 )             33.3     2024 05:30    136    |  98     |  66     ----------------------------<  106    3.3     |  25     |  2.30     Ca    8.3        2024 05:30  Phos  2.9       2024 05:30  Mg     1.9       2024 05:30    TPro  6.6    /  Alb  2.6    /  TBili  3.1    /  DBili  x      /  AST  166    /  ALT  142    /  AlkPhos  486    2024 05:30    LIVER FUNCTIONS - ( 2024 05:30 )  Alb: 2.6 g/dL / Pro: 6.6 g/dL / ALK PHOS: 486 U/L / ALT: 142 U/L / AST: 166 U/L / GGT: x           acetaminophen     Tablet .. 650 milliGRAM(s) Oral every 6 hours PRN  ALPRAZolam 0.125 milliGRAM(s) Oral at bedtime PRN  aluminum hydroxide/magnesium hydroxide/simethicone Suspension 30 milliLiter(s) Oral every 4 hours PRN  aspirin enteric coated 81 milliGRAM(s) Oral daily  buMETAnide Injectable 1 milliGRAM(s) IV Push two times a day  chlorhexidine 2% Cloths 1 Application(s) Topical <User Schedule>  clopidogrel Tablet 75 milliGRAM(s) Oral daily  DOBUTamine Infusion 3 MICROgram(s)/kG/Min IV Continuous <Continuous>  FLUoxetine 20 milliGRAM(s) Oral <User Schedule>  melatonin 3 milliGRAM(s) Oral at bedtime PRN  ondansetron Injectable 4 milliGRAM(s) IV Push every 8 hours PRN  oxymetazoline 0.05% Nasal Spray 2 Spray(s) Both Nostrils every 12 hours PRN  pantoprazole    Tablet 40 milliGRAM(s) Oral before breakfast  rosuvastatin 20 milliGRAM(s) Oral at bedtime  sodium bicarbonate 650 milliGRAM(s) Oral three times a day  tamsulosin 0.8 milliGRAM(s) Oral at bedtime  vancomycin  IVPB      vancomycin  IVPB 1000 milliGRAM(s) IV Intermittent every 24 hours  zolpidem 5 milliGRAM(s) Oral at bedtime PRN    A/P:    CM, EF < 20%, lung ca, mod MR, mod-severe TR  Cardio-renal BRODY/CKD 3 (Cr 2.14 - 24)   Hypervolemic hyponatremia iso BRODY/CKD, CHF, malignancy   CT A/P w/o hydro 24  UA negative 24  Resp distress iso CM, lung ca  V/Q scan w/low prob for PE 24  Tx to ICU, improved w/ qtt  On  and Bumex  Cr is improving   Continue Bumex per ICU team   Avoid nephrotoxins, no NSAID's  F/u Vanco level, BMP, Mg, UO  Suppl K  D/w family at bedside      [phone]

## 2024-06-12 NOTE — PHYSICAL THERAPY INITIAL EVALUATION ADULT - PERTINENT HX OF CURRENT PROBLEM, REHAB EVAL
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 Pt was BIBEMS to  ED due to lethargy and hypotension tonight after taking temazepam, Prozac, flomax and Coreg. Of note, Pt had syncope event on 5/31 from a sitting position he fell forward and hit his chin and Lt arm, which are bruised. Pt accompanied by wife, who contributed most of the history, she state Pt has been slower and had decreased appetite in the last week after falling. Pt saw Dr. Walden today and Coreg was reduced by half but Pt has not started on regimen.

## 2024-06-12 NOTE — PHYSICAL THERAPY INITIAL EVALUATION ADULT - ADDITIONAL COMMENTS
Pt lives with wife in  with 3 steps to enter without rail. At baseline pt is independent with ambulation, only uses RW at night

## 2024-06-12 NOTE — PROGRESS NOTE ADULT - ASSESSMENT
Assessment:  John Becerra is a 79 year old man with past medical history of Bipolar disorder, Coronary artery disease (history of STEMI in 2022, with multivessel CAD with partial viability on nuclear), HFrEF (LVEF 35% in May 2022), and Lung cancer (s/p radiation and lobectomy) with recent hospitalization for acute on chronic systolic heart failure now presents with hypotension and lethargy, found to have signs of end stage heart failure and cardiorenal syndrome.     ECG on admission consistent with sinus bradycardia, RBBB and old anteroseptal/anterior infarct, similar to prior ECG. Troponins elevated and have peaked, likely secondary to renal disease. Echo report with LVEF < 20%, severely reduced RV systolic function, moderate-severe tricuspid regurgitation. Prior coronary angiogram from 2022 with normal left main, 70% stenosis of LAD, 70% stenosis of first diagonal, normal LCx and 100% stenosis of RCA that was medically managed after viability testing indicated mostly infarcted tissue.    Recommendations:  [] End stage heart failure: As per discussion with Dr. Ardon and per chart review it appears that cardiologist, Dr. Oh had discussion with oncologist and patient's heart failure physician, Dr. Jay Shell and patient was deemed not to be a candidate for advanced therapies based on his lung cancer and renal disease and they recommend Dobutamine infusion for palliative support. Palliative care evaluated patient and no interest in home hospice.  In regards to guideline directed medical therapy due to CKD with a GFR of 26, the patient is likely not an ideal candidate for Entresto, Farxiga and Spironolactone at this time. Continue diuresis as tolerated.     As per [facility] Heart failure team and patient's cardiologist, Dr. Jay Shell plan to discharge patient on Dobutamine with close outpatient HF follow up - being arranged with case management.     Discussed with ICU attending, Dr. Espinal. Will sign out this case to cardiologist to follow along tomorrow.    Adam Gilliam MD  Cardiology

## 2024-06-12 NOTE — PROGRESS NOTE ADULT - SUBJECTIVE AND OBJECTIVE BOX
F/U Note:    79y Male admitted with CHF exacerbation, started on [id number] Male admitted with     Interval Hx:  -remains on fixed dose dobutamine, will like be d/c'ed home on     Vital Signs Last 24 Hrs  T(C): 36.7 (2024 15:00), Max: 36.7 (2024 11:00)  T(F): 98.1 (2024 15:00), Max: 98.1 (2024 15:00)  HR: 85 (2024 19:00) (78 - 89)  BP: 83/67 (2024 19:00) (83/67 - 119/61)  BP(mean): 74 (2024 19:00) (64 - 90)  RR: 18 (2024 19:00) (13 - 20)  SpO2: 100% (2024 19:00) (95% - 100%)    Parameters below as of 2024 19:00  Patient On (Oxygen Delivery Method): room air                                11.0   5.08  )-----------( 81       ( 2024 05:30 )             33.3         06-12    136  |  98  |  66<H>  ----------------------------<  106<H>  3.3<L>   |  25  |  2.30<H>    Ca    8.3<L>      2024 05:30  Phos  2.9     0612  Mg     1.9     12    TPro  6.6  /  Alb  2.6<L>  /  TBili  3.1<H>  /  DBili  x   /  AST  166<H>  /  ALT  142<H>  /  AlkPhos  486<H>  12        NEURO: no headaches, blurry vision, tremors, depression, anxity  CV: no chest pain, palpitations, murmurs, orthopnea  Resp: no shortness of breath, cough, wheeze, sputum production  GI: no stomach pain,nausea, vomitting, flatulence, hematemesis, hematochezia  PV: no swelling of extremities, no hair loss, no coolness to extremities  ENDO: no polydypsia, polyphagia, polyuria, weight loss, night sweats          NEURO: awake and alert  CV: (+) S1/S2, rrr, no mrg  RESP: CTA b/l  GI: soft, non tender

## 2024-06-12 NOTE — PROGRESS NOTE ADULT - PROBLEM SELECTOR PLAN 1
Metastatic lung cancer with evidence of progression of disease.  After short interval off dobutamine, patient resume dobutamine drip with preservation of BP, HR and O2 saturation.  Plan to discharge home on dobutamine drip.  Meanwhile no plans for systemic treatment of metastatic lung cancer, as discussed with his oncologist.  Will follow-up at [facility] once ambulatory.

## 2024-06-12 NOTE — PROGRESS NOTE ADULT - ASSESSMENT
PLAN:      on dobutamine, cards following, monitor for urine output  -diursed with bumex, continue diuresis for goal net negative, follow lytes with diuresis  -patient is DNR/DNI  -AM labs  -DVT prophylaxis          TIME SPENT:  35 minutes of  time spent providing medical care for patient's acute illness/conditions that impairs at least one vital organ system and/or poses a high risk of imminent or life threatening deterioration in the patient's condition. It includes time spent evaluating and treating the patient's acute illness as well as time spent reviewing labs, radiology, discussing goals of care with patient and/or patient's family, and discussing the case with a multidisciplinary team, including the eICU, in an effort to prevent further life threatening deterioration or end organ damage. This time is independent of any procedures performed.    DATE OF ENTRY OF THIS NOTE IS EQUAL TO THE DATE OF SERVICES RENDERED

## 2024-06-12 NOTE — PROGRESS NOTE ADULT - ASSESSMENT
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 In ICU for dobutamine drip    Problems List:  Acute decompensation of heart failure  not a candidate for RVAD / LVAD based on discussion with cardio  CKD   CAD  Lung cancer in non smoker with new lung nodules    NEURO:  -Currently A&O x 3  -Continue mobilization     CV:  #CHF: EF 20%,  Dobutamine drip 3mcg/kg/min  plan to titrate off dobutamine drip.   If continue to unable to come off dobutamine, plan for home dobutamine drip with PICC line  Need PICC line placement  Diuresis with Bumex 1mg BID  Maintain MAP >65  F/u on cardiac consult    PULM:  Currently off oxygen  Place NC if saturation <92%    RENAL:  #CKD   Monitor I&Os  24 hour diuresis 700ml    GI:  #Diet: DASH/TLC  Monitor patient's BM    METABOLIC:  #Electrolyte abnormalities  Monitor K+ as patient is getting diuresis     ID:  #Cellulitis on right arm  Vancomycin 1g    PROPHYLAXIS:  Lovenox currently held d/t episode of epistaxis   Pantoprazole for ppx    SKIN:  #Lines: PIV     DISPOSITION  ICU LEVEL OF CARE  DNR/DNI     79M PMH CAD, 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 In ICU for dobutamine drip    Problems List:  Acute decompensation of heart failure  not a candidate for RVAD / LVAD based on discussion with cardio  CKD   CAD  Lung cancer in non smoker with new lung nodules    NEURO:  Currently A&O x 3  Continue mobilization     CV:  #CHF: EF 20%,  Dobutamine drip 3mcg/kg/min  plan to titrate off dobutamine drip.   If continue to unable to come off dobutamine, plan for home dobutamine drip with PICC line  Need PICC line placement  Diuresis with Bumex 1mg BID  Maintain MAP >65  F/u on cardiac consult  f/u on heart failure team on setup for home dobutamine drip and pump setup    PULM:  Currently off oxygen  Place NC if saturation <92%    RENAL:  #CKD   Monitor I&Os  24 hour diuresis 700ml    GI:  #Diet: DASH/TLC  Monitor patient's BM    METABOLIC:  #Electrolyte abnormalities  Monitor K+ as patient is getting diuresis     ID:  #Cellulitis on right arm  Vancomycin 1g    PROPHYLAXIS:  Lovenox currently held d/t episode of epistaxis   Pantoprazole for ppx    SKIN:  #Lines: PIV     DISPOSITION  ICU LEVEL OF CARE  DNR/DNI

## 2024-06-12 NOTE — PROGRESS NOTE ADULT - SUBJECTIVE AND OBJECTIVE BOX
Critical Care Progress Note    CHIEF COMPLAINT:Patient is a 79y old  Male who presents with a chief complaint of hypotension, elevated trop (12 Jun 2024 08:56)      PMH & PSH  PAST MEDICAL & SURGICAL HISTORY:  BPH (benign prostatic hyperplasia)  Bipolar disorder  Depression  Anxiety  Umbilical hernia, incarcerated  Constipation  Urinary retention  CAD (coronary artery disease)  SCC (squamous cell carcinoma)  Lung mass  CHF, chronic  Heart failure with reduced ejection fraction  History of arthroscopy of knee Right, 1987  History of tonsillectomy 1952  History of hernia repair  H/O coronary angiogram    SOCIAL HISTORY: Negative  ALLERGIES: No Known Allergies    HOME MEDICATIONS  Home Medications:  aspirin 81 mg oral delayed release tablet: 1 tab(s) orally once a day (06 Jun 2024 02:59)  clopidogrel 75 mg oral tablet: 1 tab(s) orally once a day (06 Jun 2024 02:59)  FLUoxetine (Eqv-PROzac) 20 mg oral tablet: 1 tab(s) orally every 3 days (06 Jun 2024 02:59)  latanoprost 0.005% ophthalmic solution: 1 drop(s) in each eye once a day (06 Jun 2024 02:59)  mirtazapine 15 mg oral tablet: 1 tab(s) orally once a day (at bedtime) (06 Jun 2024 02:59)  temazepam 15 mg oral capsule: 1 cap(s) orally once a day (at bedtime) as needed for  insomnia (06 Jun 2024 02:59)    MEDICATIONS  STANDING MEDICATIONS  aspirin enteric coated 81 milliGRAM(s) Oral daily  buMETAnide Injectable 1 milliGRAM(s) IV Push two times a day  chlorhexidine 2% Cloths 1 Application(s) Topical <User Schedule>  clopidogrel Tablet 75 milliGRAM(s) Oral daily  DOBUTamine Infusion 3 MICROgram(s)/kG/Min IV Continuous <Continuous>  FLUoxetine 20 milliGRAM(s) Oral <User Schedule>  pantoprazole    Tablet 40 milliGRAM(s) Oral before breakfast  potassium chloride    Tablet ER 20 milliEquivalent(s) Oral every 4 hours  rosuvastatin 20 milliGRAM(s) Oral at bedtime  sodium bicarbonate 650 milliGRAM(s) Oral three times a day  tamsulosin 0.8 milliGRAM(s) Oral at bedtime  vancomycin  IVPB      vancomycin  IVPB 1000 milliGRAM(s) IV Intermittent every 24 hours    PRN MEDICATIONS  acetaminophen     Tablet .. 650 milliGRAM(s) Oral every 6 hours PRN  ALPRAZolam 0.125 milliGRAM(s) Oral at bedtime PRN  aluminum hydroxide/magnesium hydroxide/simethicone Suspension 30 milliLiter(s) Oral every 4 hours PRN  melatonin 3 milliGRAM(s) Oral at bedtime PRN  ondansetron Injectable 4 milliGRAM(s) IV Push every 8 hours PRN  oxymetazoline 0.05% Nasal Spray 2 Spray(s) Both Nostrils every 12 hours PRN      OVERNIGHT EVENTS/INTERVAL UPDATES:  No new events overnight.  Denies SOB/CP.     PHYSICAL EXAM:  GENERAL: Patient is alert, follow simple commands, ambulating, NAD, generalized ecchymosis  HEAD:  Atraumatic   NECK: Supple, symmetrical, tracheal midline, no JVD   CHEST/LUNG: CTA B/L with bilateral air entry  HEART: S1/S2, RRR, no mursmurs  ABDOMEN: Soft, Nontender, Nondistended; Bowel sounds present  EXTREMITIES:  2+ Peripheral Pulses, No clubbing, cyanosis, or edema      LABS:                        11.0   5.08  )-----------( 81       ( 12 Jun 2024 05:30 )             33.3     06-12    136  |  98  |  66<H>  ----------------------------<  106<H>  3.3<L>   |  25  |  2.30<H>    Ca    8.3<L>      12 Jun 2024 05:30  Phos  2.9     06-12  Mg     1.9     06-12    TPro  6.6  /  Alb  2.6<L>  /  TBili  3.1<H>  /  DBili  x   /  AST  166<H>  /  ALT  142<H>  /  AlkPhos  486<H>  06-12      Urinalysis Basic - ( 12 Jun 2024 05:30 )    Color: x / Appearance: x / SG: x / pH: x  Gluc: 106 mg/dL / Ketone: x  / Bili: x / Urobili: x   Blood: x / Protein: x / Nitrite: x   Leuk Esterase: x / RBC: x / WBC x   Sq Epi: x / Non Sq Epi: x / Bacteria: x    COVID  05-26-24 @ 14:58  COVID -   NotDete  12-22-23 @ 21:20  COVID -   NotDetec  12-18-23 @ 15:55  COVID -   NotDetec  02-15-23 @ 15:50  COVID -   NotDetec  01-03-23 @ 17:32  COVID -   NotDetec    COVID Biomarkers    05-28-24 @ 19:40 ESR --  ---  CRP --  ---  DDimer  682<H>   ---   LDH --   ---   Ferritin --    05-28-24 @ 06:47 ESR --  ---  CRP --  ---  DDimer  --   ---   <H>   ---   Ferritin 93      Trend Cardiac Enzymes    Trend BNP  06-08-24 @ 05:40   -  6057<H>  06-06-24 @ 01:00   -  5254<H>  05-26-24 @ 14:25   -  5050<H>  05-22-24 @ 14:31   -  4697<H>    WBC Trend  06-12-24 @ 05:30   -  5.08  06-11-24 @ 05:00   -  5.27  06-10-24 @ 05:00   -  4.73    H/H Trend  06-12-24 @ 05:30   -   11.0<L>/ 33.3<L>  06-11-24 @ 05:00   -   12.0<L>/ 35.2<L>  06-10-24 @ 05:00   -   11.7<L>/ 33.8<L>  06-09-24 @ 05:15   -   11.3<L>/ 32.8<L>  06-08-24 @ 05:40   -   11.3<L>/ 32.4<L>  06-07-24 @ 06:01   -   11.7<L>/ 35.6<L>      Platelet Trend  06-12-24 @ 05:30   -  81<L>  06-11-24 @ 05:00   -  89<L>  06-10-24 @ 05:00   -  95<L>    Trend Sodium  06-12-24 @ 05:30   -  136  06-11-24 @ 05:00   -  134<L>  06-10-24 @ 05:00   -  132<L>  06-09-24 @ 15:20   -  131<L>    Trend Potassium  06-12-24 @ 05:30   -  3.3<L>  06-11-24 @ 05:00   -  4.0  06-10-24 @ 05:00   -  3.5  06-09-24 @ 15:20   -  3.8    Trend Bun/Cr  06-12-24 @ 05:30  BUN/CR -  66<H> / 2.30<H>  06-11-24 @ 05:00  BUN/CR -  71<H> / 2.33<H>  06-10-24 @ 05:00  BUN/CR -  76<H> / 2.48<H>  06-09-24 @ 15:20  BUN/CR -  87<H> / 2.95<H>    Lactic Acid Trend  06-06-24 @ 05:00   -   1.9  06-06-24 @ 01:00   -   2.7<H>    ABG Trend  06-06-24 @ 01:20   - 7.39/21<L>/117<H>/98.7<H>  05-28-24 @ 18:17   - 7.50<H>/23<L>/118<H>/98.3<H>  09-29-22 @ 04:33   - 7.46<H>/33<L>/77<L>/95.7  09-28-22 @ 20:02   - 7.51<H>/30<L>/103/98.7<H>  09-27-22 @ 04:20   - 7.49<H>/32<L>/136<H>/98.2<H>  09-25-22 @ 20:52   - 7.49<H>/31<L>/101/97.6  09-25-22 @ 04:45   - 7.45/35/85/96.3  09-24-22 @ 22:00   - 7.47<H>/33<L>/83/96.2  09-24-22 @ 16:40   - 7.51<H>/29<L>/83/95.8  09-24-22 @ 10:00   - 7.46<H>/30<L>/75<L>/94.1  09-24-22 @ 03:10   - 7.46<H>/29<L>/140<H>/97.9  09-23-22 @ 23:30   - 7.44/31<L>/97/98.0    Trend AST/ALT/ALK Phos/Bili  06-12-24 @ 05:30   166<H>/142<H>/486<H>/3.1<H>  06-11-24 @ 05:00   239<H>/174<H>/528<H>/3.6<H>  06-08-24 @ 05:40   304<H>/204<H>/445<H>/3.7<H>  06-07-24 @ 06:01   287<H>/192<H>/442<H>/4.1<H>  06-06-24 @ 08:32   177<H>/140<H>/415<H>/3.4<H>  06-06-24 @ 01:00   176<H>/134<H>/444<H>/3.4<H>  05-30-24 @ 07:50   117<H>/102<H>/422<H>/3.0<H>  05-29-24 @ 06:54   110<H>/88<H>/365<H>/3.1<H>  05-28-24 @ 06:47   115<H>/73<H>/372<H>/2.9<H>  05-27-24 @ 06:52   90<H>/63<H>/316<H>/3.0<H>  05-26-24 @ 14:25   76<H>/38/317<H>/3.4<H>  05-25-24 @ 06:07   42<H>/38/311<H>/2.3<H>      Ammonia Trend  06-06-24 @ 16:50   -   26      Amylase / Lipase Trend  05-23-24 @ 10:42   -   -- / 40  04-19-24 @ 14:45   -   -- / 39      Albumin Trend  06-12-24 @ 05:30   -   2.6<L>  06-11-24 @ 05:00   -   2.8<L>  06-08-24 @ 05:40   -   2.7<L>  06-07-24 @ 06:01   -   2.8<L>  06-06-24 @ 08:32   -   2.8<L>  06-06-24 @ 01:00   -   2.9<L>      PTT - PT - INR Trend  05-30-24 @ 07:50   -   31.7 - -- - --  05-29-24 @ 06:54   -   -- - 15.8<H> - 1.36<H>  04-19-24 @ 14:45   -   34.7 - 12.2 - 1.04  12-22-23 @ 16:35   -   29.7 - 12.7 - 1.12  01-09-23 @ 13:32   -   29.4 - 12.5 - 1.08  01-03-23 @ 17:32   -   29.5 - 15.6<H> - 1.34<H>    Glucose Trend  06-12-24 @ 05:30   -  106<H> -- --  06-11-24 @ 05:00   -  96 -- --  06-10-24 @ 05:00   -  89 -- --  06-09-24 @ 15:20   -  139<H> -- --          CULTURES: (if applicable)      RADIOLOGY  CXR:      CT:    ECHO:      VITALS:  Ins and Outs   T(C): 36.6 (06-12-24 @ 07:00), Max: 37.2 (06-11-24 @ 09:00)  T(F): 97.9 (06-12-24 @ 07:00), Max: 98.9 (06-11-24 @ 09:00)  HR: 89 (06-12-24 @ 10:00) (64 - 89)  BP: 99/67 (06-12-24 @ 10:00) (81/63 - 119/61)  BP(mean): 76 (06-12-24 @ 10:00) (64 - 87)  RR: 18 (06-12-24 @ 10:00)  SpO2: 98% (06-12-24 @ 10:00)      06-11-24 @ 07:01  -  06-12-24 @ 07:00  --------------------------------------------------------  IN:    DOBUTamine: 64.2 mL  Total IN: 64.2 mL    OUT:    Voided (mL): 2550 mL  Total OUT: 2550 mL    Total NET: -2485.8 mL      06-12-24 @ 07:01  -  06-12-24 @ 10:32  --------------------------------------------------------  IN:    DOBUTamine: 8.4 mL    IV PiggyBack: 100 mL    Oral Fluid: 300 mL  Total IN: 408.4 mL    OUT:    Voided (mL): 300 mL  Total OUT: 300 mL    Total NET: 108.4 mL           Critical Care Progress Note  Patient seen and examined at bedside. Patient is seen up next to bedside, saturating well on room air. Complained of insomnia, no apparent distress. Last BM 06/10 as per RN     CHIEF COMPLAINT: Patient is a 79y old  Male who presents with a chief complaint of hypotension, elevated trop (12 Jun 2024 08:56)      PMH & PSH  PAST MEDICAL & SURGICAL HISTORY:  BPH (benign prostatic hyperplasia)  Bipolar disorder  Depression  Anxiety  Umbilical hernia, incarcerated  Constipation  Urinary retention  CAD (coronary artery disease)  SCC (squamous cell carcinoma)  Lung mass  CHF, chronic  Heart failure with reduced ejection fraction  History of arthroscopy of knee Right, 1987  History of tonsillectomy 1952  History of hernia repair  H/O coronary angiogram    SOCIAL HISTORY: Negative  ALLERGIES: No Known Allergies    HOME MEDICATIONS  Home Medications:  aspirin 81 mg oral delayed release tablet: 1 tab(s) orally once a day (06 Jun 2024 02:59)  clopidogrel 75 mg oral tablet: 1 tab(s) orally once a day (06 Jun 2024 02:59)  FLUoxetine (Eqv-PROzac) 20 mg oral tablet: 1 tab(s) orally every 3 days (06 Jun 2024 02:59)  latanoprost 0.005% ophthalmic solution: 1 drop(s) in each eye once a day (06 Jun 2024 02:59)  mirtazapine 15 mg oral tablet: 1 tab(s) orally once a day (at bedtime) (06 Jun 2024 02:59)  temazepam 15 mg oral capsule: 1 cap(s) orally once a day (at bedtime) as needed for  insomnia (06 Jun 2024 02:59)    MEDICATIONS  STANDING MEDICATIONS  aspirin enteric coated 81 milliGRAM(s) Oral daily  buMETAnide Injectable 1 milliGRAM(s) IV Push two times a day  chlorhexidine 2% Cloths 1 Application(s) Topical <User Schedule>  clopidogrel Tablet 75 milliGRAM(s) Oral daily  DOBUTamine Infusion 3 MICROgram(s)/kG/Min IV Continuous <Continuous>  FLUoxetine 20 milliGRAM(s) Oral <User Schedule>  pantoprazole    Tablet 40 milliGRAM(s) Oral before breakfast  potassium chloride    Tablet ER 20 milliEquivalent(s) Oral every 4 hours  rosuvastatin 20 milliGRAM(s) Oral at bedtime  sodium bicarbonate 650 milliGRAM(s) Oral three times a day  tamsulosin 0.8 milliGRAM(s) Oral at bedtime  vancomycin  IVPB      vancomycin  IVPB 1000 milliGRAM(s) IV Intermittent every 24 hours    PRN MEDICATIONS  acetaminophen     Tablet .. 650 milliGRAM(s) Oral every 6 hours PRN  ALPRAZolam 0.125 milliGRAM(s) Oral at bedtime PRN  aluminum hydroxide/magnesium hydroxide/simethicone Suspension 30 milliLiter(s) Oral every 4 hours PRN  melatonin 3 milliGRAM(s) Oral at bedtime PRN  ondansetron Injectable 4 milliGRAM(s) IV Push every 8 hours PRN  oxymetazoline 0.05% Nasal Spray 2 Spray(s) Both Nostrils every 12 hours PRN      OVERNIGHT EVENTS/INTERVAL UPDATES:  No new events overnight.  Denies SOB/CP.     PHYSICAL EXAM:  GENERAL: Patient is alert, follow simple commands, ambulating, NAD, generalized ecchymosis  HEAD:  Atraumatic   NECK: Supple, symmetrical, tracheal midline, no JVD   CHEST/LUNG: CTA B/L with bilateral air entry  HEART: S1/S2, RRR, no mursmurs  ABDOMEN: Soft, Nontender, Nondistended; Bowel sounds present  EXTREMITIES:  2+ Peripheral Pulses, No clubbing, cyanosis, or edema      LABS:                        11.0   5.08  )-----------( 81       ( 12 Jun 2024 05:30 )             33.3     06-12    136  |  98  |  66<H>  ----------------------------<  106<H>  3.3<L>   |  25  |  2.30<H>    Ca    8.3<L>      12 Jun 2024 05:30  Phos  2.9     06-12  Mg     1.9     06-12    TPro  6.6  /  Alb  2.6<L>  /  TBili  3.1<H>  /  DBili  x   /  AST  166<H>  /  ALT  142<H>  /  AlkPhos  486<H>  06-12      Urinalysis Basic - ( 12 Jun 2024 05:30 )    Color: x / Appearance: x / SG: x / pH: x  Gluc: 106 mg/dL / Ketone: x  / Bili: x / Urobili: x   Blood: x / Protein: x / Nitrite: x   Leuk Esterase: x / RBC: x / WBC x   Sq Epi: x / Non Sq Epi: x / Bacteria: x    COVID  05-26-24 @ 14:58  COVID -   NotDetec  12-22-23 @ 21:20  COVID -   NotDetec  12-18-23 @ 15:55  COVID -   NotDetec  02-15-23 @ 15:50  COVID -   NotDetec  01-03-23 @ 17:32  COVID -   NotDetec    COVID Biomarkers    05-28-24 @ 19:40 ESR --  ---  CRP --  ---  DDimer  682<H>   ---   LDH --   ---   Ferritin --    05-28-24 @ 06:47 ESR --  ---  CRP --  ---  DDimer  --   ---   <H>   ---   Ferritin 93      Trend Cardiac Enzymes    Trend BNP  06-08-24 @ 05:40   -  6057<H>  06-06-24 @ 01:00   -  5254<H>  05-26-24 @ 14:25   -  5050<H>  05-22-24 @ 14:31   -  4697<H>    WBC Trend  06-12-24 @ 05:30   -  5.08  06-11-24 @ 05:00   -  5.27  06-10-24 @ 05:00   -  4.73    H/H Trend  06-12-24 @ 05:30   -   11.0<L>/ 33.3<L>  06-11-24 @ 05:00   -   12.0<L>/ 35.2<L>  06-10-24 @ 05:00   -   11.7<L>/ 33.8<L>  06-09-24 @ 05:15   -   11.3<L>/ 32.8<L>  06-08-24 @ 05:40   -   11.3<L>/ 32.4<L>  06-07-24 @ 06:01   -   11.7<L>/ 35.6<L>      Platelet Trend  06-12-24 @ 05:30   -  81<L>  06-11-24 @ 05:00   -  89<L>  06-10-24 @ 05:00   -  95<L>    Trend Sodium  06-12-24 @ 05:30   -  136  06-11-24 @ 05:00   -  134<L>  06-10-24 @ 05:00   -  132<L>  06-09-24 @ 15:20   -  131<L>    Trend Potassium  06-12-24 @ 05:30   -  3.3<L>  06-11-24 @ 05:00   -  4.0  06-10-24 @ 05:00   -  3.5  06-09-24 @ 15:20   -  3.8    Trend Bun/Cr  06-12-24 @ 05:30  BUN/CR -  66<H> / 2.30<H>  06-11-24 @ 05:00  BUN/CR -  71<H> / 2.33<H>  06-10-24 @ 05:00  BUN/CR -  76<H> / 2.48<H>  06-09-24 @ 15:20  BUN/CR -  87<H> / 2.95<H>    Lactic Acid Trend  06-06-24 @ 05:00   -   1.9  06-06-24 @ 01:00   -   2.7<H>    ABG Trend  06-06-24 @ 01:20   - 7.39/21<L>/117<H>/98.7<H>  05-28-24 @ 18:17   - 7.50<H>/23<L>/118<H>/98.3<H>  09-29-22 @ 04:33   - 7.46<H>/33<L>/77<L>/95.7  09-28-22 @ 20:02   - 7.51<H>/30<L>/103/98.7<H>  09-27-22 @ 04:20   - 7.49<H>/32<L>/136<H>/98.2<H>  09-25-22 @ 20:52   - 7.49<H>/31<L>/101/97.6  09-25-22 @ 04:45   - 7.45/35/85/96.3  09-24-22 @ 22:00   - 7.47<H>/33<L>/83/96.2  09-24-22 @ 16:40   - 7.51<H>/29<L>/83/95.8  09-24-22 @ 10:00   - 7.46<H>/30<L>/75<L>/94.1  09-24-22 @ 03:10   - 7.46<H>/29<L>/140<H>/97.9  09-23-22 @ 23:30   - 7.44/31<L>/97/98.0    Trend AST/ALT/ALK Phos/Bili  06-12-24 @ 05:30   166<H>/142<H>/486<H>/3.1<H>  06-11-24 @ 05:00   239<H>/174<H>/528<H>/3.6<H>  06-08-24 @ 05:40   304<H>/204<H>/445<H>/3.7<H>  06-07-24 @ 06:01   287<H>/192<H>/442<H>/4.1<H>  06-06-24 @ 08:32   177<H>/140<H>/415<H>/3.4<H>  06-06-24 @ 01:00   176<H>/134<H>/444<H>/3.4<H>  05-30-24 @ 07:50   117<H>/102<H>/422<H>/3.0<H>  05-29-24 @ 06:54   110<H>/88<H>/365<H>/3.1<H>  05-28-24 @ 06:47   115<H>/73<H>/372<H>/2.9<H>  05-27-24 @ 06:52   90<H>/63<H>/316<H>/3.0<H>  05-26-24 @ 14:25   76<H>/38/317<H>/3.4<H>  05-25-24 @ 06:07   42<H>/38/311<H>/2.3<H>      Ammonia Trend  06-06-24 @ 16:50   -   26      Amylase / Lipase Trend  05-23-24 @ 10:42   -   -- / 40  04-19-24 @ 14:45   -   -- / 39      Albumin Trend  06-12-24 @ 05:30   -   2.6<L>  06-11-24 @ 05:00   -   2.8<L>  06-08-24 @ 05:40   -   2.7<L>  06-07-24 @ 06:01   -   2.8<L>  06-06-24 @ 08:32   -   2.8<L>  06-06-24 @ 01:00   -   2.9<L>      PTT - PT - INR Trend  05-30-24 @ 07:50   -   31.7 - -- - --  05-29-24 @ 06:54   -   -- - 15.8<H> - 1.36<H>  04-19-24 @ 14:45   -   34.7 - 12.2 - 1.04  12-22-23 @ 16:35   -   29.7 - 12.7 - 1.12  01-09-23 @ 13:32   -   29.4 - 12.5 - 1.08  01-03-23 @ 17:32   -   29.5 - 15.6<H> - 1.34<H>    Glucose Trend  06-12-24 @ 05:30   -  106<H> -- --  06-11-24 @ 05:00   -  96 -- --  06-10-24 @ 05:00   -  89 -- --  06-09-24 @ 15:20   -  139<H> -- --          CULTURES: (if applicable)      RADIOLOGY  CXR:      CT:    ECHO:      VITALS:  Ins and Outs   T(C): 36.6 (06-12-24 @ 07:00), Max: 37.2 (06-11-24 @ 09:00)  T(F): 97.9 (06-12-24 @ 07:00), Max: 98.9 (06-11-24 @ 09:00)  HR: 89 (06-12-24 @ 10:00) (64 - 89)  BP: 99/67 (06-12-24 @ 10:00) (81/63 - 119/61)  BP(mean): 76 (06-12-24 @ 10:00) (64 - 87)  RR: 18 (06-12-24 @ 10:00)  SpO2: 98% (06-12-24 @ 10:00)      06-11-24 @ 07:01  -  06-12-24 @ 07:00  --------------------------------------------------------  IN:    DOBUTamine: 64.2 mL  Total IN: 64.2 mL    OUT:    Voided (mL): 2550 mL  Total OUT: 2550 mL    Total NET: -2485.8 mL      06-12-24 @ 07:01  -  06-12-24 @ 10:32  --------------------------------------------------------  IN:    DOBUTamine: 8.4 mL    IV PiggyBack: 100 mL    Oral Fluid: 300 mL  Total IN: 408.4 mL    OUT:    Voided (mL): 300 mL  Total OUT: 300 mL    Total NET: 108.4 mL           Critical Care Progress Note  Patient seen and examined at bedside. Patient is seen up next to bedside, saturating well on room air. Complained of insomnia, no apparent distress. Last BM 06/10 as per RN     CHIEF COMPLAINT: Patient is a 79y old  Male who presents with a chief complaint of hypotension, elevated trop (12 Jun 2024 08:56)    patient seen and examined today  feels well   no complaints      PMH & PSH  PAST MEDICAL & SURGICAL HISTORY:  BPH (benign prostatic hyperplasia)  Bipolar disorder  Depression  Anxiety  Umbilical hernia, incarcerated  Constipation  Urinary retention  CAD (coronary artery disease)  SCC (squamous cell carcinoma)  Lung mass  CHF, chronic  Heart failure with reduced ejection fraction  History of arthroscopy of knee Right, 1987  History of tonsillectomy 1952  History of hernia repair  H/O coronary angiogram    SOCIAL HISTORY: Negative  ALLERGIES: No Known Allergies    HOME MEDICATIONS  Home Medications:  aspirin 81 mg oral delayed release tablet: 1 tab(s) orally once a day (06 Jun 2024 02:59)  clopidogrel 75 mg oral tablet: 1 tab(s) orally once a day (06 Jun 2024 02:59)  FLUoxetine (Eqv-PROzac) 20 mg oral tablet: 1 tab(s) orally every 3 days (06 Jun 2024 02:59)  latanoprost 0.005% ophthalmic solution: 1 drop(s) in each eye once a day (06 Jun 2024 02:59)  mirtazapine 15 mg oral tablet: 1 tab(s) orally once a day (at bedtime) (06 Jun 2024 02:59)  temazepam 15 mg oral capsule: 1 cap(s) orally once a day (at bedtime) as needed for  insomnia (06 Jun 2024 02:59)    MEDICATIONS  STANDING MEDICATIONS  aspirin enteric coated 81 milliGRAM(s) Oral daily  buMETAnide Injectable 1 milliGRAM(s) IV Push two times a day  chlorhexidine 2% Cloths 1 Application(s) Topical <User Schedule>  clopidogrel Tablet 75 milliGRAM(s) Oral daily  DOBUTamine Infusion 3 MICROgram(s)/kG/Min IV Continuous <Continuous>  FLUoxetine 20 milliGRAM(s) Oral <User Schedule>  pantoprazole    Tablet 40 milliGRAM(s) Oral before breakfast  potassium chloride    Tablet ER 20 milliEquivalent(s) Oral every 4 hours  rosuvastatin 20 milliGRAM(s) Oral at bedtime  sodium bicarbonate 650 milliGRAM(s) Oral three times a day  tamsulosin 0.8 milliGRAM(s) Oral at bedtime  vancomycin  IVPB      vancomycin  IVPB 1000 milliGRAM(s) IV Intermittent every 24 hours    PRN MEDICATIONS  acetaminophen     Tablet .. 650 milliGRAM(s) Oral every 6 hours PRN  ALPRAZolam 0.125 milliGRAM(s) Oral at bedtime PRN  aluminum hydroxide/magnesium hydroxide/simethicone Suspension 30 milliLiter(s) Oral every 4 hours PRN  melatonin 3 milliGRAM(s) Oral at bedtime PRN  ondansetron Injectable 4 milliGRAM(s) IV Push every 8 hours PRN  oxymetazoline 0.05% Nasal Spray 2 Spray(s) Both Nostrils every 12 hours PRN      OVERNIGHT EVENTS/INTERVAL UPDATES:  No new events overnight.  Denies SOB/CP.     PHYSICAL EXAM:  GENERAL: Patient is alert, follow simple commands, ambulating, NAD, generalized ecchymosis  HEAD:  Atraumatic   NECK: Supple, symmetrical, tracheal midline, no JVD   CHEST/LUNG: CTA B/L with bilateral air entry  HEART: S1/S2, RRR, no mursmurs  ABDOMEN: Soft, Nontender, Nondistended; Bowel sounds present  EXTREMITIES:  2+ Peripheral Pulses, No clubbing, cyanosis, or edema      LABS:                        11.0   5.08  )-----------( 81       ( 12 Jun 2024 05:30 )             33.3     06-12    136  |  98  |  66<H>  ----------------------------<  106<H>  3.3<L>   |  25  |  2.30<H>    Ca    8.3<L>      12 Jun 2024 05:30  Phos  2.9     06-12  Mg     1.9     06-12    TPro  6.6  /  Alb  2.6<L>  /  TBili  3.1<H>  /  DBili  x   /  AST  166<H>  /  ALT  142<H>  /  AlkPhos  486<H>  06-12      Urinalysis Basic - ( 12 Jun 2024 05:30 )    Color: x / Appearance: x / SG: x / pH: x  Gluc: 106 mg/dL / Ketone: x  / Bili: x / Urobili: x   Blood: x / Protein: x / Nitrite: x   Leuk Esterase: x / RBC: x / WBC x   Sq Epi: x / Non Sq Epi: x / Bacteria: x    COVID  05-26-24 @ 14:58  COVID -   NotDetec  12-22-23 @ 21:20  COVID -   NotDetec  12-18-23 @ 15:55  COVID -   NotDetec  02-15-23 @ 15:50  COVID -   NotDetec  01-03-23 @ 17:32  COVID -   NotDetec    COVID Biomarkers    05-28-24 @ 19:40 ESR --  ---  CRP --  ---  DDimer  682<H>   ---   LDH --   ---   Ferritin --    05-28-24 @ 06:47 ESR --  ---  CRP --  ---  DDimer  --   ---   <H>   ---   Ferritin 93      Trend Cardiac Enzymes    Trend BNP  06-08-24 @ 05:40   -  6057<H>  06-06-24 @ 01:00   -  5254<H>  05-26-24 @ 14:25   -  5050<H>  05-22-24 @ 14:31   -  4697<H>    WBC Trend  06-12-24 @ 05:30   -  5.08  06-11-24 @ 05:00   -  5.27  06-10-24 @ 05:00   -  4.73    H/H Trend  06-12-24 @ 05:30   -   11.0<L>/ 33.3<L>  06-11-24 @ 05:00   -   12.0<L>/ 35.2<L>  06-10-24 @ 05:00   -   11.7<L>/ 33.8<L>  06-09-24 @ 05:15   -   11.3<L>/ 32.8<L>  06-08-24 @ 05:40   -   11.3<L>/ 32.4<L>  06-07-24 @ 06:01   -   11.7<L>/ 35.6<L>      Platelet Trend  06-12-24 @ 05:30   -  81<L>  06-11-24 @ 05:00   -  89<L>  06-10-24 @ 05:00   -  95<L>    Trend Sodium  06-12-24 @ 05:30   -  136  06-11-24 @ 05:00   -  134<L>  06-10-24 @ 05:00   -  132<L>  06-09-24 @ 15:20   -  131<L>    Trend Potassium  06-12-24 @ 05:30   -  3.3<L>  06-11-24 @ 05:00   -  4.0  06-10-24 @ 05:00   -  3.5  06-09-24 @ 15:20   -  3.8    Trend Bun/Cr  06-12-24 @ 05:30  BUN/CR -  66<H> / 2.30<H>  06-11-24 @ 05:00  BUN/CR -  71<H> / 2.33<H>  06-10-24 @ 05:00  BUN/CR -  76<H> / 2.48<H>  06-09-24 @ 15:20  BUN/CR -  87<H> / 2.95<H>    Lactic Acid Trend  06-06-24 @ 05:00   -   1.9  06-06-24 @ 01:00   -   2.7<H>    ABG Trend  06-06-24 @ 01:20   - 7.39/21<L>/117<H>/98.7<H>  05-28-24 @ 18:17   - 7.50<H>/23<L>/118<H>/98.3<H>  09-29-22 @ 04:33   - 7.46<H>/33<L>/77<L>/95.7  09-28-22 @ 20:02   - 7.51<H>/30<L>/103/98.7<H>  09-27-22 @ 04:20   - 7.49<H>/32<L>/136<H>/98.2<H>  09-25-22 @ 20:52   - 7.49<H>/31<L>/101/97.6  09-25-22 @ 04:45   - 7.45/35/85/96.3  09-24-22 @ 22:00   - 7.47<H>/33<L>/83/96.2  09-24-22 @ 16:40   - 7.51<H>/29<L>/83/95.8  09-24-22 @ 10:00   - 7.46<H>/30<L>/75<L>/94.1  09-24-22 @ 03:10   - 7.46<H>/29<L>/140<H>/97.9  09-23-22 @ 23:30   - 7.44/31<L>/97/98.0    Trend AST/ALT/ALK Phos/Bili  06-12-24 @ 05:30   166<H>/142<H>/486<H>/3.1<H>  06-11-24 @ 05:00   239<H>/174<H>/528<H>/3.6<H>  06-08-24 @ 05:40   304<H>/204<H>/445<H>/3.7<H>  06-07-24 @ 06:01   287<H>/192<H>/442<H>/4.1<H>  06-06-24 @ 08:32   177<H>/140<H>/415<H>/3.4<H>  06-06-24 @ 01:00   176<H>/134<H>/444<H>/3.4<H>  05-30-24 @ 07:50   117<H>/102<H>/422<H>/3.0<H>  05-29-24 @ 06:54   110<H>/88<H>/365<H>/3.1<H>  05-28-24 @ 06:47   115<H>/73<H>/372<H>/2.9<H>  05-27-24 @ 06:52   90<H>/63<H>/316<H>/3.0<H>  05-26-24 @ 14:25   76<H>/38/317<H>/3.4<H>  05-25-24 @ 06:07   42<H>/38/311<H>/2.3<H>      Ammonia Trend  06-06-24 @ 16:50   -   26      Amylase / Lipase Trend  05-23-24 @ 10:42   -   -- / 40  04-19-24 @ 14:45   -   -- / 39      Albumin Trend  06-12-24 @ 05:30   -   2.6<L>  06-11-24 @ 05:00   -   2.8<L>  06-08-24 @ 05:40   -   2.7<L>  06-07-24 @ 06:01   -   2.8<L>  06-06-24 @ 08:32   -   2.8<L>  06-06-24 @ 01:00   -   2.9<L>      PTT - PT - INR Trend  05-30-24 @ 07:50   -   31.7 - -- - --  05-29-24 @ 06:54   -   -- - 15.8<H> - 1.36<H>  04-19-24 @ 14:45   -   34.7 - 12.2 - 1.04  12-22-23 @ 16:35   -   29.7 - 12.7 - 1.12  01-09-23 @ 13:32   -   29.4 - 12.5 - 1.08  01-03-23 @ 17:32   -   29.5 - 15.6<H> - 1.34<H>    Glucose Trend  06-12-24 @ 05:30   -  106<H> -- --  06-11-24 @ 05:00   -  96 -- --  06-10-24 @ 05:00   -  89 -- --  06-09-24 @ 15:20   -  139<H> -- --           VITALS:  Ins and Outs   T(C): 36.6 (06-12-24 @ 07:00), Max: 37.2 (06-11-24 @ 09:00)  T(F): 97.9 (06-12-24 @ 07:00), Max: 98.9 (06-11-24 @ 09:00)  HR: 89 (06-12-24 @ 10:00) (64 - 89)  BP: 99/67 (06-12-24 @ 10:00) (81/63 - 119/61)  BP(mean): 76 (06-12-24 @ 10:00) (64 - 87)  RR: 18 (06-12-24 @ 10:00)  SpO2: 98% (06-12-24 @ 10:00)      06-11-24 @ 07:01  -  06-12-24 @ 07:00  --------------------------------------------------------  IN:    DOBUTamine: 64.2 mL  Total IN: 64.2 mL    OUT:    Voided (mL): 2550 mL  Total OUT: 2550 mL    Total NET: -2485.8 mL      06-12-24 @ 07:01  -  06-12-24 @ 10:32  --------------------------------------------------------  IN:    DOBUTamine: 8.4 mL    IV PiggyBack: 100 mL    Oral Fluid: 300 mL  Total IN: 408.4 mL    OUT:    Voided (mL): 300 mL  Total OUT: 300 mL    Total NET: 108.4 mL

## 2024-06-12 NOTE — PROGRESS NOTE ADULT - SUBJECTIVE AND OBJECTIVE BOX
ELYSE MALAVE  [id number]      Chief Complaint: End stage heart failure/Lung cancer    Interval History: The patient reports breathing is stable and urinating well.     Tele: sinus 80s BPM, PVCs        Current meds:   acetaminophen     Tablet .. 650 milliGRAM(s) Oral every 6 hours PRN  ALPRAZolam 0.125 milliGRAM(s) Oral at bedtime PRN  aluminum hydroxide/magnesium hydroxide/simethicone Suspension 30 milliLiter(s) Oral every 4 hours PRN  aspirin enteric coated 81 milliGRAM(s) Oral daily  buMETAnide Injectable 1 milliGRAM(s) IV Push two times a day  chlorhexidine 2% Cloths 1 Application(s) Topical <User Schedule>  clopidogrel Tablet 75 milliGRAM(s) Oral daily  DOBUTamine Infusion 3 MICROgram(s)/kG/Min IV Continuous <Continuous>  FLUoxetine 20 milliGRAM(s) Oral <User Schedule>  melatonin 3 milliGRAM(s) Oral at bedtime PRN  ondansetron Injectable 4 milliGRAM(s) IV Push every 8 hours PRN  oxymetazoline 0.05% Nasal Spray 2 Spray(s) Both Nostrils every 12 hours PRN  pantoprazole    Tablet 40 milliGRAM(s) Oral before breakfast  potassium chloride    Tablet ER 20 milliEquivalent(s) Oral every 4 hours  rosuvastatin 20 milliGRAM(s) Oral at bedtime  sodium bicarbonate 650 milliGRAM(s) Oral three times a day  tamsulosin 0.8 milliGRAM(s) Oral at bedtime  vancomycin  IVPB      vancomycin  IVPB 1000 milliGRAM(s) IV Intermittent every 24 hours      Objective:     Vital Signs:   T(C): 36.6 (06-12-24 @ 07:00), Max: 37.2 (06-11-24 @ 09:00)  HR: 86 (06-12-24 @ 08:00) (71 - 89)  BP: 110/61 (06-12-24 @ 08:00) (84/63 - 119/61)  RR: 13 (06-12-24 @ 08:00) (10 - 23)  SpO2: 100% (06-12-24 @ 08:00) (95% - 100%)  Wt(kg): --      Physical Exam:   General: no acute distress  HEENT: sclera anicteric  Neck: supple  CVS: JVP ~ 7 cm H20, RRR, s1, s2, systolic murmur  Chest: unlabored respirations, CTAB  Abdomen: non-distended  Extremities: no lower extremity edema b/l  Neuro: awake, alert & oriented  Psych: normal affect        Labs:   12 Jun 2024 05:30    136    |  98     |  66     ----------------------------<  106    3.3     |  25     |  2.30     Ca    8.3        12 Jun 2024 05:30  Phos  2.9       12 Jun 2024 05:30  Mg     1.9       12 Jun 2024 05:30    TPro  6.6    /  Alb  2.6    /  TBili  3.1    /  DBili  x      /  AST  166    /  ALT  142    /  AlkPhos  486    12 Jun 2024 05:30                          11.0   5.08  )-----------( 81       ( 12 Jun 2024 05:30 )             33.3           TTE (4/7/24):  LVEF 25-30%, mild LVH  Mildly enlarged right ventricle.  Mild-moderate MR, Moderate TR, Mild AR, Mild OH    TTE (6/6/24):  LVEF < 20%  Severely enlarged RV with severely reduced RV systolic function  RV pressure/volume overload  Moderate MR, Moderate-severe TR, Mild AR  Mild pulmonary hypertension       ECG (6/6/24): sinus bradycardia, first degree AV block, RBBB, anteroseptal/anterior infarct (similar to prior ECG)

## 2024-06-12 NOTE — PROGRESS NOTE ADULT - SUBJECTIVE AND OBJECTIVE BOX
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  Patient was in the sitting position when he fell forward and hit his chin and left arm, with noticeable bruising on physical examination.  Of note, CT chest in early April 2024 showed left upper lobe nodule within the radiation field of more rounded contour, gradually becoming more discrete compared to prior studies.  Adjustment in cardiac medication done by cardiology (Dr. Walden reduced Coreg by 50%).  Oncology consulted in light of evidence of POD on CT chest.    MEDICATIONS:  acetaminophen     Tablet .. 650 milliGRAM(s) Oral every 6 hours PRN  aluminum hydroxide/magnesium hydroxide/simethicone Suspension 30 milliLiter(s) Oral every 4 hours PRN  aspirin enteric coated 81 milliGRAM(s) Oral daily  buMETAnide Injectable 1 milliGRAM(s) IV Push two times a day  chlorhexidine 2% Cloths 1 Application(s) Topical <User Schedule>  clopidogrel Tablet 75 milliGRAM(s) Oral daily  FLUoxetine 20 milliGRAM(s) Oral <User Schedule>  heparin   Injectable 5000 Unit(s) SubCutaneous every 12 hours  melatonin 3 milliGRAM(s) Oral at bedtime PRN  ondansetron Injectable 4 milliGRAM(s) IV Push every 8 hours PRN  pantoprazole    Tablet 40 milliGRAM(s) Oral before breakfast  rosuvastatin 20 milliGRAM(s) Oral at bedtime  sodium bicarbonate 650 milliGRAM(s) Oral three times a day  tamsulosin 0.8 milliGRAM(s) Oral at bedtime  traZODone 25 milliGRAM(s) Oral at bedtime PRN  vancomycin  IVPB 1000 milliGRAM(s) IV Intermittent once    All other medications reviewed.    SUBJECTIVE:  Back on Dobutamine drip; offers no new complaints    VITALS:  T(C): 37 (06-10-24 @ 05:00), Max: 37 (06-10-24 @ 05:00)  T(F): 98.6 (06-10-24 @ 05:00), Max: 98.6 (06-10-24 @ 05:00)  HR: 68 (06-10-24 @ 06:00) (64 - 76)  BP: 100/64 (06-10-24 @ 06:00) (81/63 - 111/68)      PHYSICAL EXAM:  Constitutional: alert, well developed  HEENT: normocephalic, anicteric sclerae, no mucositis or thrush  Respiratory: bilateral clear to auscultation anteriorly  Cardiovascular : S1, S2 regular, rhythmic, no murmurs, gallops or rubs  Abdomen: soft, distended, + normoactive BS, no palpable HS- megaly  Extremities: no tenderness;  -c/c/e, pulses equal bilaterally    LABS:  (06-10) WBC: 4.73 K/uL,Hemoglobin: 11.7 g/dL, Hematocrit: 33.8 %,  Platelet: 95 K/uL  (06-10) Na: 132 mmol/L ; K: 3.5 mmol/L ; BUN: 76 mg/dL ; Cr: 2.48 mg/dL.    RADIOLOGY:  ACC: [id number] EXAM:  CT BRAIN   ORDERED BY: CATHI MONTES     PROCEDURE DATE:  06/06/2024          INTERPRETATION:  NONCONTRAST CT OF THE BRAIN DATED 6/6/2024.    CLINICAL INFORMATION:  Head injury.    TECHNIQUE: Axial CT images are obtained from the cranial vertex to the   skull base without the administration of IV contrast. Images are   reformatted in sagittal and coronal planes.    The study is correlated with an MRI of the brain from 2/1/2023 and PET/CT   from 5/5/2024..    FINDINGS:    There is no acute intra-axial or extra-axial hemorrhage. There are dense   calcifications in the left basal ganglia which are seen on prior PET CTs.   There is no mass effect or shift of the midline. The basal cisterns are   not effaced. There is cerebral and cerebellar volume loss with prominence   of the ventricles, sulci, and cerebellar folia. There are mild chronic   ischemic changes in the frontoparietal white matter. There are   atherosclerotic calcifications of the intracranial carotid arteries.    There is no significant scalp soft tissue swelling or scalp hematoma. The   skull base and bony calvarium are intact. The visualized paranasal   sinuses and tympanic/mastoid cavities are clear apart from minimal   bilateral ethmoid mucosal thickening and a left middle ear and mastoid   effusion.    IMPRESSION:  No acute intracranial hemorrhage, mass effect, or acute osseous fracture.  Dense calcifications in the left basal ganglia unchanged compared to prior PET CTs.  Left middleear and mastoid effusion. Correlate with clinical exam.

## 2024-06-12 NOTE — PROGRESS NOTE ADULT - ATTENDING COMMENTS
Pt seen and examined    [facility] IV infusion agency called   will plan to place PICC  amandeep.     Assessment  1. Acute decompensation of heart failure     not a candidate for RVAD / LVAD based on discussion with cardio  2. CKD with BRODY  3. CAD  4. h/o Lung cancer in non smoker with new lung nodules possible new cancer    Underlying BPH, Bipolar d/o, Depression, Anxiety, CAD (coronary artery disease)    Plan:  Monitor off dobutamine infusion , plan to set up at home, discussed [facility] for set up for friday    Close hemodynamic monitoring  diuresis and aim for negative fluid balance - cont. bumex 1 mg BID  Supplement potassium   monitor labs see if renal function and liver function improves  n/c o2  over all prognosis is poor  Cardiology and Nephrology follow up   Voiding independently  Afrin nasally   can finish coure of vanco on friday as phelbitis is getting better    Palliative care following, refused hospice care   [facility] DNR/I.   ICU care while on dobutamine drip Pt seen and examined    [facility] IV infusion agency called   will plan to place PICC  amandeep.     Assessment  1. Acute decompensation of heart failure     not a candidate for RVAD / LVAD based on discussion with cardio  2. CKD with BRODY  3. CAD  4. h/o Lung cancer in non smoker with new lung nodules possible new cancer    Underlying BPH, Bipolar d/o, Depression, Anxiety, CAD (coronary artery disease)    Plan:  Monitor on dobutamine infusion , plan to set up at home, discussed [facility] for set up for friday    Close hemodynamic monitoring  diuresis and aim for negative fluid balance - cont. bumex 1 mg BID  Supplement potassium   monitor labs see if renal function and liver function improves  n/c o2  over all prognosis is poor  Cardiology and Nephrology follow up   Voiding independently  Afrin nasally   can finish coure of vanco on friday as phelbitis is getting better    Palliative care following, refused hospice care   [facility] DNR/I.   ICU care while on dobutamine drip

## 2024-06-13 PROBLEM — I50.9 HEART FAILURE, UNSPECIFIED: Chronic | Status: ACTIVE | Noted: 2024-06-06

## 2024-06-13 PROBLEM — I50.20 UNSPECIFIED SYSTOLIC (CONGESTIVE) HEART FAILURE: Chronic | Status: ACTIVE | Noted: 2024-06-06

## 2024-06-13 LAB
ALBUMIN SERPL ELPH-MCNC: 2.6 G/DL — LOW (ref 3.3–5)
ALP SERPL-CCNC: 474 U/L — HIGH (ref 40–120)
ALT FLD-CCNC: 121 U/L — HIGH (ref 10–45)
ANION GAP SERPL CALC-SCNC: 11 MMOL/L — SIGNIFICANT CHANGE UP (ref 5–17)
AST SERPL-CCNC: 141 U/L — HIGH (ref 10–40)
BILIRUB SERPL-MCNC: 2.8 MG/DL — HIGH (ref 0.2–1.2)
BUN SERPL-MCNC: 53 MG/DL — HIGH (ref 7–23)
CALCIUM SERPL-MCNC: 8.2 MG/DL — LOW (ref 8.4–10.5)
CHLORIDE SERPL-SCNC: 99 MMOL/L — SIGNIFICANT CHANGE UP (ref 96–108)
CO2 SERPL-SCNC: 26 MMOL/L — SIGNIFICANT CHANGE UP (ref 22–31)
CREAT SERPL-MCNC: 2.08 MG/DL — HIGH (ref 0.5–1.3)
EGFR: 32 ML/MIN/1.73M2 — LOW
GLUCOSE SERPL-MCNC: 95 MG/DL — SIGNIFICANT CHANGE UP (ref 70–99)
HCT VFR BLD CALC: 32.7 % — LOW (ref 39–50)
HGB BLD-MCNC: 10.9 G/DL — LOW (ref 13–17)
MAGNESIUM SERPL-MCNC: 1.8 MG/DL — SIGNIFICANT CHANGE UP (ref 1.6–2.6)
MCHC RBC-ENTMCNC: 30.2 PG — SIGNIFICANT CHANGE UP (ref 27–34)
MCHC RBC-ENTMCNC: 33.3 GM/DL — SIGNIFICANT CHANGE UP (ref 32–36)
MCV RBC AUTO: 90.6 FL — SIGNIFICANT CHANGE UP (ref 80–100)
NRBC # BLD: 0 /100 WBCS — SIGNIFICANT CHANGE UP (ref 0–0)
PHOSPHATE SERPL-MCNC: 2.6 MG/DL — SIGNIFICANT CHANGE UP (ref 2.5–4.5)
PLATELET # BLD AUTO: 74 K/UL — LOW (ref 150–400)
POTASSIUM SERPL-MCNC: 3.2 MMOL/L — LOW (ref 3.5–5.3)
POTASSIUM SERPL-SCNC: 3.2 MMOL/L — LOW (ref 3.5–5.3)
PROT SERPL-MCNC: 6.7 G/DL — SIGNIFICANT CHANGE UP (ref 6–8.3)
RBC # BLD: 3.61 M/UL — LOW (ref 4.2–5.8)
RBC # FLD: 16.9 % — HIGH (ref 10.3–14.5)
SODIUM SERPL-SCNC: 136 MMOL/L — SIGNIFICANT CHANGE UP (ref 135–145)
VANCOMYCIN FLD-MCNC: 19.3 UG/ML — SIGNIFICANT CHANGE UP
WBC # BLD: 5.28 K/UL — SIGNIFICANT CHANGE UP (ref 3.8–10.5)
WBC # FLD AUTO: 5.28 K/UL — SIGNIFICANT CHANGE UP (ref 3.8–10.5)

## 2024-06-13 PROCEDURE — 99232 SBSQ HOSP IP/OBS MODERATE 35: CPT

## 2024-06-13 PROCEDURE — 71045 X-RAY EXAM CHEST 1 VIEW: CPT | Mod: 26

## 2024-06-13 RX ORDER — BUMETANIDE 0.25 MG/ML
1 INJECTION INTRAMUSCULAR; INTRAVENOUS
Refills: 0 | Status: DISCONTINUED | OUTPATIENT
Start: 2024-06-13 | End: 2024-06-14

## 2024-06-13 RX ORDER — POTASSIUM CHLORIDE 20 MEQ
40 PACKET (EA) ORAL ONCE
Refills: 0 | Status: COMPLETED | OUTPATIENT
Start: 2024-06-13 | End: 2024-06-13

## 2024-06-13 RX ADMIN — TAMSULOSIN HYDROCHLORIDE 0.8 MILLIGRAM(S): 0.4 CAPSULE ORAL at 21:51

## 2024-06-13 RX ADMIN — BUMETANIDE 1 MILLIGRAM(S): 0.25 INJECTION INTRAMUSCULAR; INTRAVENOUS at 05:46

## 2024-06-13 RX ADMIN — BUMETANIDE 1 MILLIGRAM(S): 0.25 INJECTION INTRAMUSCULAR; INTRAVENOUS at 14:21

## 2024-06-13 RX ADMIN — Medication 3 MILLIGRAM(S): at 21:51

## 2024-06-13 RX ADMIN — CLOPIDOGREL BISULFATE 75 MILLIGRAM(S): 75 TABLET, FILM COATED ORAL at 17:23

## 2024-06-13 RX ADMIN — Medication 250 MILLIGRAM(S): at 10:39

## 2024-06-13 RX ADMIN — ZOLPIDEM TARTRATE 5 MILLIGRAM(S): 10 TABLET ORAL at 21:51

## 2024-06-13 RX ADMIN — Medication 4.21 MICROGRAM(S)/KG/MIN: at 21:51

## 2024-06-13 RX ADMIN — PANTOPRAZOLE SODIUM 40 MILLIGRAM(S): 20 TABLET, DELAYED RELEASE ORAL at 05:47

## 2024-06-13 RX ADMIN — CHLORHEXIDINE GLUCONATE 1 APPLICATION(S): 213 SOLUTION TOPICAL at 05:46

## 2024-06-13 RX ADMIN — ROSUVASTATIN CALCIUM 20 MILLIGRAM(S): 5 TABLET ORAL at 21:51

## 2024-06-13 RX ADMIN — Medication 81 MILLIGRAM(S): at 17:23

## 2024-06-13 RX ADMIN — Medication 40 MILLIEQUIVALENT(S): at 10:38

## 2024-06-13 RX ADMIN — Medication 250 MILLIGRAM(S): at 00:18

## 2024-06-13 NOTE — PROCEDURE NOTE - NSPROCDETAILS_GEN_ALL_CORE
sterile technique, indwelling urinary device inserted
location identified, draped/prepped, sterile technique used/sterile dressing applied/sterile technique, catheter placed/supine position/Trendelenburg position/ultrasound guidance

## 2024-06-13 NOTE — PROGRESS NOTE ADULT - SUBJECTIVE AND OBJECTIVE BOX
Date of service  is equal to the date of entry    Patient is a 79y old  Male who presents with a chief complaint of hypotension, elevated trop (13 Jun 2024 22:54)    PAST MEDICAL & SURGICAL HISTORY:  BPH (benign prostatic hyperplasia)      Bipolar disorder      Depression      Anxiety      Umbilical hernia, incarcerated      Constipation      Urinary retention      CAD (coronary artery disease)      SCC (squamous cell carcinoma)      Lung mass      CHF, chronic      Heart failure with reduced ejection fraction      History of arthroscopy of knee  Right, 1987      History of tonsillectomy  1952      History of hernia repair      H/O coronary angiogram        ELYSE MALAVE   79y    Male    BRIEF HOSPITAL COURSE:    Review of Systems:                       All other ROS are negative.    Allergies    No Known Allergies    Intolerances          ICU Vital Signs Last 24 Hrs  T(C): 36.4 (13 Jun 2024 21:00), Max: 36.8 (13 Jun 2024 16:00)  T(F): 97.5 (13 Jun 2024 21:00), Max: 98.2 (13 Jun 2024 16:00)  HR: 84 (13 Jun 2024 22:00) (80 - 97)  BP: 103/66 (13 Jun 2024 22:00) (82/42 - 109/96)  BP(mean): 77 (13 Jun 2024 22:00) (54 - 101)  ABP: --  ABP(mean): --  RR: 11 (13 Jun 2024 22:00) (11 - 24)  SpO2: 97% (13 Jun 2024 22:00) (90% - 100%)    O2 Parameters below as of 13 Jun 2024 20:00  Patient On (Oxygen Delivery Method): room air          Physical Examination:    General:     HEENT:     PULM:     CVS:     ABD:     EXT:     SKIN:     Neuro:          LABS:                        10.9   5.28  )-----------( 74       ( 13 Jun 2024 05:00 )             32.7     06-13    136  |  99  |  53<H>  ----------------------------<  95  3.2<L>   |  26  |  2.08<H>    Ca    8.2<L>      13 Jun 2024 05:00  Phos  2.6     06-13  Mg     1.8     06-13    TPro  6.7  /  Alb  2.6<L>  /  TBili  2.8<H>  /  DBili  x   /  AST  141<H>  /  ALT  121<H>  /  AlkPhos  474<H>  06-13          CAPILLARY BLOOD GLUCOSE          Urinalysis Basic - ( 13 Jun 2024 05:00 )    Color: x / Appearance: x / SG: x / pH: x  Gluc: 95 mg/dL / Ketone: x  / Bili: x / Urobili: x   Blood: x / Protein: x / Nitrite: x   Leuk Esterase: x / RBC: x / WBC x   Sq Epi: x / Non Sq Epi: x / Bacteria: x      CULTURES:      Medications:  MEDICATIONS  (STANDING):  aspirin enteric coated 81 milliGRAM(s) Oral daily  buMETAnide 1 milliGRAM(s) Oral two times a day  chlorhexidine 2% Cloths 1 Application(s) Topical <User Schedule>  clopidogrel Tablet 75 milliGRAM(s) Oral daily  DOBUTamine Infusion 3 MICROgram(s)/kG/Min (4.21 mL/Hr) IV Continuous <Continuous>  FLUoxetine 20 milliGRAM(s) Oral <User Schedule>  pantoprazole    Tablet 40 milliGRAM(s) Oral before breakfast  rosuvastatin 20 milliGRAM(s) Oral at bedtime  tamsulosin 0.8 milliGRAM(s) Oral at bedtime  vancomycin  IVPB      vancomycin  IVPB 1000 milliGRAM(s) IV Intermittent every 24 hours    MEDICATIONS  (PRN):  acetaminophen     Tablet .. 650 milliGRAM(s) Oral every 6 hours PRN Temp greater or equal to 38C (100.4F), Mild Pain (1 - 3)  ALPRAZolam 0.125 milliGRAM(s) Oral at bedtime PRN insomina  aluminum hydroxide/magnesium hydroxide/simethicone Suspension 30 milliLiter(s) Oral every 4 hours PRN Dyspepsia  melatonin 3 milliGRAM(s) Oral at bedtime PRN Insomnia  ondansetron Injectable 4 milliGRAM(s) IV Push every 8 hours PRN Nausea and/or Vomiting  oxymetazoline 0.05% Nasal Spray 2 Spray(s) Both Nostrils every 12 hours PRN epistatixis  zolpidem 5 milliGRAM(s) Oral at bedtime PRN Insomnia        06-12 @ 07:01  -  06-13 @ 07:00  --------------------------------------------------------  IN: 1930.8 mL / OUT: 2200 mL / NET: -269.2 mL    06-13 @ 07:01  - 06-13 @ 23:49  --------------------------------------------------------  IN: 187.2 mL / OUT: 350 mL / NET: -162.8 mL        RADIOLOGY/IMAGING/ECHO    Critical care point of care ultrasound:    Assessment/Plan:          CRITICAL CARE TIME SPENT:    37 minutes assessing presenting problems of acute illness, which pose high probability of life threatening deterioration or end organ damage/dysfunction, as well as medical decision making including initiating plan of care, reviewing data, reviewing radiologic exams, discussing with multidisciplinary team,  discussing goals of care with patient/family, and writing this note.  Non-inclusive of procedures performed.  The date of service for this encounter is the entered date.         Date of service  is equal to the date of entry    Patient is a 79y old  Male who presents with a chief complaint of hypotension, elevated trop (13 Jun 2024 22:54)    PAST MEDICAL & SURGICAL HISTORY:  BPH (benign prostatic hyperplasia)      Bipolar disorder      Depression      Anxiety      Umbilical hernia, incarcerated      Constipation      Urinary retention      CAD (coronary artery disease)      SCC (squamous cell carcinoma)      Lung mass      CHF, chronic      Heart failure with reduced ejection fraction      History of arthroscopy of knee  Right, 1987      History of tonsillectomy  1952      History of hernia repair      H/O coronary angiogram        ELYSE MALAVE   79y    Male    BRIEF HOSPITAL COURSE:    Review of Systems:                       All other ROS are negative.    Allergies    No Known Allergies    Intolerances          ICU Vital Signs Last 24 Hrs  T(C): 36.4 (13 Jun 2024 21:00), Max: 36.8 (13 Jun 2024 16:00)  T(F): 97.5 (13 Jun 2024 21:00), Max: 98.2 (13 Jun 2024 16:00)  HR: 84 (13 Jun 2024 22:00) (80 - 97)  BP: 103/66 (13 Jun 2024 22:00) (82/42 - 109/96)  BP(mean): 77 (13 Jun 2024 22:00) (54 - 101)  ABP: --  ABP(mean): --  RR: 11 (13 Jun 2024 22:00) (11 - 24)  SpO2: 97% (13 Jun 2024 22:00) (90% - 100%)    O2 Parameters below as of 13 Jun 2024 20:00  Patient On (Oxygen Delivery Method): room air          Physical Examination:    General:  NAD        LABS:                        10.9   5.28  )-----------( 74       ( 13 Jun 2024 05:00 )             32.7     06-13    136  |  99  |  53<H>  ----------------------------<  95  3.2<L>   |  26  |  2.08<H>    Ca    8.2<L>      13 Jun 2024 05:00  Phos  2.6     06-13  Mg     1.8     06-13    TPro  6.7  /  Alb  2.6<L>  /  TBili  2.8<H>  /  DBili  x   /  AST  141<H>  /  ALT  121<H>  /  AlkPhos  474<H>  06-13          CAPILLARY BLOOD GLUCOSE          Urinalysis Basic - ( 13 Jun 2024 05:00 )    Color: x / Appearance: x / SG: x / pH: x  Gluc: 95 mg/dL / Ketone: x  / Bili: x / Urobili: x   Blood: x / Protein: x / Nitrite: x   Leuk Esterase: x / RBC: x / WBC x   Sq Epi: x / Non Sq Epi: x / Bacteria: x      CULTURES:      Medications:  MEDICATIONS  (STANDING):  aspirin enteric coated 81 milliGRAM(s) Oral daily  buMETAnide 1 milliGRAM(s) Oral two times a day  chlorhexidine 2% Cloths 1 Application(s) Topical <User Schedule>  clopidogrel Tablet 75 milliGRAM(s) Oral daily  DOBUTamine Infusion 3 MICROgram(s)/kG/Min (4.21 mL/Hr) IV Continuous <Continuous>  FLUoxetine 20 milliGRAM(s) Oral <User Schedule>  pantoprazole    Tablet 40 milliGRAM(s) Oral before breakfast  rosuvastatin 20 milliGRAM(s) Oral at bedtime  tamsulosin 0.8 milliGRAM(s) Oral at bedtime  vancomycin  IVPB      vancomycin  IVPB 1000 milliGRAM(s) IV Intermittent every 24 hours    MEDICATIONS  (PRN):  acetaminophen     Tablet .. 650 milliGRAM(s) Oral every 6 hours PRN Temp greater or equal to 38C (100.4F), Mild Pain (1 - 3)  ALPRAZolam 0.125 milliGRAM(s) Oral at bedtime PRN insomina  aluminum hydroxide/magnesium hydroxide/simethicone Suspension 30 milliLiter(s) Oral every 4 hours PRN Dyspepsia  melatonin 3 milliGRAM(s) Oral at bedtime PRN Insomnia  ondansetron Injectable 4 milliGRAM(s) IV Push every 8 hours PRN Nausea and/or Vomiting  oxymetazoline 0.05% Nasal Spray 2 Spray(s) Both Nostrils every 12 hours PRN epistatixis  zolpidem 5 milliGRAM(s) Oral at bedtime PRN Insomnia        06-12 @ 07:01  -  06-13 @ 07:00  --------------------------------------------------------  IN: 1930.8 mL / OUT: 2200 mL / NET: -269.2 mL    06-13 @ 07:01  -  06-13 @ 23:49  --------------------------------------------------------  IN: 187.2 mL / OUT: 350 mL / NET: -162.8 mL        RADIOLOGY/IMAGING/ECHO      < from: Xray Chest 1 View- PORTABLE-Routine (06.08.24 @ 09:27) >  INTERPRETATION:  HISTORY: Admitting Dxs: N17.9 ACUTE KIDNEY FAILURE,   UNSPECIFIED;  SOB;  TECHNIQUE: Portable frontal view of the chest, 1 view.  COMPARISON: 6/6/2024.  FINDINGS/  IMPRESSION:    HEART:  Enlarged.  LUNGS: Small right effusion with scarring. Atelectasis in the left upper   lobe.  BONES: degenerative changes    < end of copied text >  < from: TTE Echo Complete w/o Contrast w/ Doppler (06.06.24 @ 08:29) >  Summary:   1. Biatrial enlargement   2. Left ventricular ejection fraction, by visual estimation, is <20%.   3. Severely decreased global left ventricular systolic function.   4. Entire septum, entire apex, and mid anterior segment are abnormal as   described above.   5. Increased LV wall thickness.   6. Mildly increased left ventricular internal cavity size.   7. Right ventricular volume and pressure overload.   8. There is mild concentric left ventricular hypertrophy.   9. Severely enlarged right ventricle.  10. Severely reduced RV systolic function.  11. Moderate mitral valve regurgitation.  12. Moderate-severe tricuspid regurgitation.  13. Mild aortic regurgitation.  14. Mild pulmonic valve regurgitation.  15. Estimated pulmonary artery systolic pressure is 44.6 mmHg assuming a   right atrial pressure of 15 mmHg, which is consistent with mild pulmonary   hypertension.  16. LA volume Index is 38.5 ml/m² ml/m2.          Assessment/Plan:    79M, PMHx CAD, combined systolic and diastolic HF with EF 25-30% (Echo 4/2024), valvular disease (mild-moderate MR, moderate TR), CKD, anxiety/depression/bipolar disorder, history of lung cancer s/p RUL lobectomy (9/2022)     Here with acute decompensated HF/BRODY on CKD  improved with medical rx.      [facility]  home dobutamine fixd dose 3 mics/kg/min    Completing vanco for phlebitis

## 2024-06-13 NOTE — CHART NOTE - NSCHARTNOTEFT_GEN_A_CORE
Nutrition Follow Up Note  Hospital Course (Per Electronic Medical Record):   Source: Medical Record [X] Patient [X] Nursing Staff [X]     Diet: DASH / TLC 1200 FR     Patient reports a good appetite consuming 50-75% , Labs reviewed ,hx of CKD, lung cancer, patient awaiting a PICC line for Dobutamine for discharge , Patient instructed & is aware of diet restrictions & encouraged diet compliance with FR , Patient noted for discharge (6/14)     Current Weight: (6/9) 201.5/91 kg                          (6/8) 208.5/94.6kg     Pertinent Medications: MEDICATIONS  (STANDING):  aspirin enteric coated 81 milliGRAM(s) Oral daily  buMETAnide 1 milliGRAM(s) Oral two times a day  chlorhexidine 2% Cloths 1 Application(s) Topical <User Schedule>  clopidogrel Tablet 75 milliGRAM(s) Oral daily  DOBUTamine Infusion 3 MICROgram(s)/kG/Min (4.21 mL/Hr) IV Continuous <Continuous>  FLUoxetine 20 milliGRAM(s) Oral <User Schedule>  pantoprazole    Tablet 40 milliGRAM(s) Oral before breakfast  rosuvastatin 20 milliGRAM(s) Oral at bedtime  tamsulosin 0.8 milliGRAM(s) Oral at bedtime  vancomycin  IVPB      vancomycin  IVPB 1000 milliGRAM(s) IV Intermittent every 24 hours    MEDICATIONS  (PRN):  acetaminophen     Tablet .. 650 milliGRAM(s) Oral every 6 hours PRN Temp greater or equal to 38C (100.4F), Mild Pain (1 - 3)  ALPRAZolam 0.125 milliGRAM(s) Oral at bedtime PRN insomina  aluminum hydroxide/magnesium hydroxide/simethicone Suspension 30 milliLiter(s) Oral every 4 hours PRN Dyspepsia  melatonin 3 milliGRAM(s) Oral at bedtime PRN Insomnia  ondansetron Injectable 4 milliGRAM(s) IV Push every 8 hours PRN Nausea and/or Vomiting  oxymetazoline 0.05% Nasal Spray 2 Spray(s) Both Nostrils every 12 hours PRN epistatixis  zolpidem 5 milliGRAM(s) Oral at bedtime PRN Insomnia      Pertinent Labs:  06-13 Na136 mmol/L Glu 95 mg/dL K+ 3.2 mmol/L<L> Cr  2.08 mg/dL<H> BUN 53 mg/dL<H> 06-13 Phos 2.6 mg/dL 06-13 Alb 2.6 g/dL<L>Hgb 10.9g/dl<L> , Hct 32.7& <L>        Skin: intact    Edema: (+2) LE     Last BM: (6/10)     Estimated Needs:   [X] No Change since Previous Assessment    Previous Nutrition Diagnosis: none       New Nutrition Diagnosis: [X] Not Applicable      Interventions:   1. continue current nutrition regimen       Monitoring & Evaluation: will monitor:  [X] Weights   [X] PO Intake   [X] Follow Up (Per Protocol)  [X] Tolerance to Diet Prescription       RD to follow as per Nutrition protocol  Racheal Mendoza RDN

## 2024-06-13 NOTE — PROCEDURE NOTE - NSICDXPROCEDURE_GEN_ALL_CORE_FT
PROCEDURES:  Insertion, PICC, using imaging guidance, older than 5 years 13-Jun-2024 12:52:49  Sulma Sarmiento N

## 2024-06-13 NOTE — PROGRESS NOTE ADULT - ATTENDING COMMENTS
Pt seen and examined    Former[facility] IV infusion agency called   will plan to place PICC  amandeep.     Assessment  1. Acute decompensation of heart failure     not a candidate for RVAD / LVAD based on discussion with cardio  2. CKD with BRODY  3. CAD  4. h/o Lung cancer in non smoker with new lung nodules possible new cancer    Underlying BPH, Bipolar d/o, Depression, Anxiety, CAD (coronary artery disease)    Plan:  Monitor off dobutamine infusion , plan to set up at home, discussed [facility] for set up for friday    Close hemodynamic monitoring  diuresis and aim for negative fluid balance - cont. bumex 1 mg BID  Supplement potassium   monitor labs see if renal function and liver function improves  n/c o2  over all prognosis is poor  Cardiology and Nephrology follow up   Voiding independently  Afrin nasally   can finish coure of vanco on friday as phelbitis is getting better    Palliative care following, refused hospice care   [facility] DNR/I.   ICU care while on dobutamine drip Pt seen and examined  [facility] care to come evaluate  PICC Line placed ACP      Assessment  1. Acute decompensation of heart failure     not a candidate for RVAD / LVAD based on discussion with cardio  2. CKD with BRODY  3. CAD  4. h/o Lung cancer in non smoker with new lung nodules possible new cancer    Underlying BPH, Bipolar d/o, Depression, Anxiety, CAD (coronary artery disease)    Plan:  Dobutamine drip  Convert bumex to oral      Close hemodynamic monitoring   monitor labs see if renal function and liver function improves  n/c o2  over all prognosis is poor  d/w wife  Cardiology and Nephrology follow up   Voiding independently  Afrin nasally   can finish course of vanco on friday as Right Upper Extremity phelbitis is resolved    Palliative care following, refused hospice care   [facility] DNR/I.   ICU care while on dobutamine drip

## 2024-06-13 NOTE — PROGRESS NOTE ADULT - SUBJECTIVE AND OBJECTIVE BOX
Seen in ICU, comfortable on RA    Vital Signs Last 24 Hrs  T(C): 36.4 (24 @ 21:00), Max: 36.8 (24 @ 16:00)  T(F): 97.5 (24 @ 21:00), Max: 98.2 (24 @ 16:00)  HR: 84 (24 @ 22:00) (80 - 97)  BP: 103/66 (24 @ 22:00) (82/42 - 109/96)  BP(mean): 77 (24 @ 22:00) (54 - 101)  RR: 11 (24 @ 22:00) (11 - 24)  SpO2: 97% (24 @ 22:00) (90% - 100%)    I&O's Detail    2024 07:01  -  2024 07:00  --------------------------------------------------------  IN:    DOBUTamine: 100.8 mL    IV PiggyBack: 100 mL    Oral Fluid: 1730 mL  Total IN: 1930.8 mL    OUT:    Voided (mL): 2200 mL  Total OUT: 2200 mL    2024 07:01  -  2024 22:54  --------------------------------------------------------  IN:    DOBUTamine: 67.2 mL    Oral Fluid: 120 mL  Total IN: 187.2 mL    OUT:    Voided (mL): 350 mL  Total OUT: 350 mL    s1s2  b/l air entry  soft, NT  sm edema                                              10.9   5.28  )-----------( 74       ( 2024 05:00 )             32.7     2024 05:00    136    |  99     |  53     ----------------------------<  95     3.2     |  26     |  2.08     Ca    8.2        2024 05:00  Phos  2.6       2024 05:00  Mg     1.8       2024 05:00    TPro  6.7    /  Alb  2.6    /  TBili  2.8    /  DBili  x      /  AST  141    /  ALT  121    /  AlkPhos  474    2024 05:00    LIVER FUNCTIONS - ( 2024 05:00 )  Alb: 2.6 g/dL / Pro: 6.7 g/dL / ALK PHOS: 474 U/L / ALT: 121 U/L / AST: 141 U/L / GGT: x           acetaminophen     Tablet .. 650 milliGRAM(s) Oral every 6 hours PRN  ALPRAZolam 0.125 milliGRAM(s) Oral at bedtime PRN  aluminum hydroxide/magnesium hydroxide/simethicone Suspension 30 milliLiter(s) Oral every 4 hours PRN  aspirin enteric coated 81 milliGRAM(s) Oral daily  buMETAnide 1 milliGRAM(s) Oral two times a day  chlorhexidine 2% Cloths 1 Application(s) Topical <User Schedule>  clopidogrel Tablet 75 milliGRAM(s) Oral daily  DOBUTamine Infusion 3 MICROgram(s)/kG/Min IV Continuous <Continuous>  FLUoxetine 20 milliGRAM(s) Oral <User Schedule>  melatonin 3 milliGRAM(s) Oral at bedtime PRN  ondansetron Injectable 4 milliGRAM(s) IV Push every 8 hours PRN  oxymetazoline 0.05% Nasal Spray 2 Spray(s) Both Nostrils every 12 hours PRN  pantoprazole    Tablet 40 milliGRAM(s) Oral before breakfast  rosuvastatin 20 milliGRAM(s) Oral at bedtime  tamsulosin 0.8 milliGRAM(s) Oral at bedtime  vancomycin  IVPB      vancomycin  IVPB 1000 milliGRAM(s) IV Intermittent every 24 hours  zolpidem 5 milliGRAM(s) Oral at bedtime PRN    A/P:    CM, EF < 20%, lung ca, mod MR, mod-severe TR  Cardio-renal BRODY/CKD 3   CT A/P w/o hydro 24  UA negative 24  Resp distress iso CM, lung ca  V/Q scan w/low prob for PE 24  Tx to ICU, improved w/ qtt  On  and Bumex  Cr is improving and is presently near baseline  Avoid nephrotoxins, no NSAID's  F/u Vanco level, BMP, Mg, UO  Suppl K  D/w family at bedside      [phone]

## 2024-06-13 NOTE — PROGRESS NOTE ADULT - ASSESSMENT
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 In ICU for dobutamine drip    Problems List:  Acute decompensation of heart failure  not a candidate for RVAD / LVAD based on discussion with cardio  CKD   CAD  Lung cancer in non smoker with new lung nodules    NEURO:  Currently A&O x 3  Continue mobilization     CV:  #CHF: EF 20%,  Dobutamine drip 3mcg/kg/min  If continue to unable to come off dobutamine, plan for home dobutamine drip with PICC line  Need PICC line placement  Diuresis with Bumex 1mg BID  Maintain MAP >65      PULM:  Currently off oxygen  Place NC if saturation <92%    RENAL:  #CKD   Monitor I&Os  24 hour diuresis 700ml    Heme/Onc   #Advanced Pulmonary Neoplasm  -oncology following, no plans for systemic chemotx, plan is for supportive care, pt and wife in agreement     GI:  #Diet: DASH/TLC  Monitor patient's BM    METABOLIC:  #Electrolyte abnormalities  Monitor K+ as patient is getting diuresis   Replete K today     ID:  #Cellulitis on right arm  Vancomycin 1g  can continue abx dosing until tomorrow     PROPHYLAXIS:  Lovenox currently held d/t episode of epistaxis   Pantoprazole for ppx    SKIN:  #Lines: PIV     DISPOSITION  ICU LEVEL OF CARE  DNR/DNI  PICC Line today, possible discharge in 24-48 hrs.      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 In ICU for dobutamine drip    Problems List:  Acute decompensation of heart failure  not a candidate for RVAD / LVAD based on discussion with cardio  CKD   CAD  Lung cancer in non smoker with new lung nodules    NEURO:  Currently A&O x 3  Continue mobilization     CV:  #CHF: EF 20%,  Dobutamine drip 3mcg/kg/min  If continue to unable to come off dobutamine, plan for home dobutamine drip with PICC line  Need PICC line placement  Diuresis with Bumex 1mg BID  Maintain MAP >65      PULM:  Currently off oxygen  Place NC if saturation <92%    RENAL:  #CKD   Monitor I&Os  24 hour diuresis 700ml    Heme/Onc   #Advanced Pulmonary Neoplasm  -oncology following, no plans for systemic chemotx, plan is for supportive care, pt and wife in agreement     GI:  #Diet: DASH/TLC  Monitor patient's BM    METABOLIC:  #Electrolyte abnormalities  Monitor K+ as patient is getting diuresis   Replete K today     ID:  #Cellulitis on right arm  Vancomycin 1g  can continue abx dosing until tomorrow     PROPHYLAXIS:  Lovenox currently held d/t episode of epistaxis   Pantoprazole for ppx    SKIN:  #Lines: PIV     DISPOSITION  ICU LEVEL OF CARE  DNR/DNI  PICC Line today, possible discharge in am

## 2024-06-13 NOTE — PROGRESS NOTE ADULT - SUBJECTIVE AND OBJECTIVE BOX
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  Patient was in the sitting position when he fell forward and hit his chin and left arm, with noticeable bruising on physical examination.  Of note, CT chest in early April 2024 showed left upper lobe nodule within the radiation field of more rounded contour, gradually becoming more discrete compared to prior studies.  Adjustment in cardiac medication done by cardiology (Dr. Walden reduced Coreg by 50%).  Oncology consulted in light of evidence of POD on CT chest.    MEDICATIONS:  acetaminophen     Tablet .. 650 milliGRAM(s) Oral every 6 hours PRN  aluminum hydroxide/magnesium hydroxide/simethicone Suspension 30 milliLiter(s) Oral every 4 hours PRN  aspirin enteric coated 81 milliGRAM(s) Oral daily  buMETAnide Injectable 1 milliGRAM(s) IV Push two times a day  chlorhexidine 2% Cloths 1 Application(s) Topical <User Schedule>  clopidogrel Tablet 75 milliGRAM(s) Oral daily  FLUoxetine 20 milliGRAM(s) Oral <User Schedule>  heparin   Injectable 5000 Unit(s) SubCutaneous every 12 hours  melatonin 3 milliGRAM(s) Oral at bedtime PRN  ondansetron Injectable 4 milliGRAM(s) IV Push every 8 hours PRN  pantoprazole    Tablet 40 milliGRAM(s) Oral before breakfast  rosuvastatin 20 milliGRAM(s) Oral at bedtime  sodium bicarbonate 650 milliGRAM(s) Oral three times a day  tamsulosin 0.8 milliGRAM(s) Oral at bedtime  traZODone 25 milliGRAM(s) Oral at bedtime PRN  vancomycin  IVPB 1000 milliGRAM(s) IV Intermittent once    All other medications reviewed.    SUBJECTIVE:  Remains on dobutamine drip in CCU; status unchanged.  VITALS:  T(C): 37 (06-10-24 @ 05:00), Max: 37 (06-10-24 @ 05:00)  T(F): 98.6 (06-10-24 @ 05:00), Max: 98.6 (06-10-24 @ 05:00)  HR: 68 (06-10-24 @ 06:00) (64 - 76)  BP: 100/64 (06-10-24 @ 06:00) (81/63 - 111/68)      PHYSICAL EXAM:  Constitutional: alert, well developed  HEENT: normocephalic, anicteric sclerae, no mucositis or thrush  Respiratory: bilateral clear to auscultation anteriorly  Cardiovascular : S1, S2 regular, rhythmic, no murmurs, gallops or rubs  Abdomen: soft, distended, + normoactive BS, no palpable HS- megaly  Extremities: no tenderness;  -c/c/e, pulses equal bilaterally    LABS:  (06-10) WBC: 4.73 K/uL,Hemoglobin: 11.7 g/dL, Hematocrit: 33.8 %,  Platelet: 95 K/uL  (06-10) Na: 132 mmol/L ; K: 3.5 mmol/L ; BUN: 76 mg/dL ; Cr: 2.48 mg/dL.    RADIOLOGY:  ACC: [id number] EXAM:  CT BRAIN   ORDERED BY: CATHI MONTES     PROCEDURE DATE:  06/06/2024          INTERPRETATION:  NONCONTRAST CT OF THE BRAIN DATED 6/6/2024.    CLINICAL INFORMATION:  Head injury.    TECHNIQUE: Axial CT images are obtained from the cranial vertex to the   skull base without the administration of IV contrast. Images are   reformatted in sagittal and coronal planes.    The study is correlated with an MRI of the brain from 2/1/2023 and PET/CT   from 5/5/2024..    FINDINGS:    There is no acute intra-axial or extra-axial hemorrhage. There are dense   calcifications in the left basal ganglia which are seen on prior PET CTs.   There is no mass effect or shift of the midline. The basal cisterns are   not effaced. There is cerebral and cerebellar volume loss with prominence   of the ventricles, sulci, and cerebellar folia. There are mild chronic   ischemic changes in the frontoparietal white matter. There are   atherosclerotic calcifications of the intracranial carotid arteries.    There is no significant scalp soft tissue swelling or scalp hematoma. The   skull base and bony calvarium are intact. The visualized paranasal   sinuses and tympanic/mastoid cavities are clear apart from minimal   bilateral ethmoid mucosal thickening and a left middle ear and mastoid   effusion.    IMPRESSION:  No acute intracranial hemorrhage, mass effect, or acute osseous fracture.  Dense calcifications in the left basal ganglia unchanged compared to prior PET CTs.  Left middleear and mastoid effusion. Correlate with clinical exam.

## 2024-06-13 NOTE — PROGRESS NOTE ADULT - PROBLEM SELECTOR PLAN 1
Advanced pulmonary neoplasm, never treated systemically, admitted with dyspnea, currently on dobutamine drip in CCU.  Plans for insertion of PICC line prior to discharge on a drip at home.  As discussed with patient's oncologist, no plans for systemic chemotherapy for lung cancer, but rather supportive care.  Patient's wife in agreement.  Case discussed with ICU team.

## 2024-06-13 NOTE — PROGRESS NOTE ADULT - SUBJECTIVE AND OBJECTIVE BOX
ELYSE MALAVE  [id number]      Chief Complaint: End stage heart failure/Lung cancer    Interval History: The patient reports breathing is stable , no CP    Tele: sinus 80s BPM      Current meds:   acetaminophen     Tablet .. 650 milliGRAM(s) Oral every 6 hours PRN  ALPRAZolam 0.125 milliGRAM(s) Oral at bedtime PRN  aluminum hydroxide/magnesium hydroxide/simethicone Suspension 30 milliLiter(s) Oral every 4 hours PRN  aspirin enteric coated 81 milliGRAM(s) Oral daily  buMETAnide Injectable 1 milliGRAM(s) IV Push two times a day  chlorhexidine 2% Cloths 1 Application(s) Topical <User Schedule>  clopidogrel Tablet 75 milliGRAM(s) Oral daily  DOBUTamine Infusion 3 MICROgram(s)/kG/Min IV Continuous <Continuous>  FLUoxetine 20 milliGRAM(s) Oral <User Schedule>  melatonin 3 milliGRAM(s) Oral at bedtime PRN  ondansetron Injectable 4 milliGRAM(s) IV Push every 8 hours PRN  oxymetazoline 0.05% Nasal Spray 2 Spray(s) Both Nostrils every 12 hours PRN  pantoprazole    Tablet 40 milliGRAM(s) Oral before breakfast  potassium chloride    Tablet ER 20 milliEquivalent(s) Oral every 4 hours  rosuvastatin 20 milliGRAM(s) Oral at bedtime  sodium bicarbonate 650 milliGRAM(s) Oral three times a day  tamsulosin 0.8 milliGRAM(s) Oral at bedtime  vancomycin  IVPB      vancomycin  IVPB 1000 milliGRAM(s) IV Intermittent every 24 hours      Objective:     Vital Signs:   T(C): 36.6 (06-12-24 @ 07:00), Max: 37.2 (06-11-24 @ 09:00)  HR: 86 (06-12-24 @ 08:00) (71 - 89)  BP: 110/61 (06-12-24 @ 08:00) (84/63 - 119/61)  RR: 13 (06-12-24 @ 08:00) (10 - 23)  SpO2: 100% (06-12-24 @ 08:00) (95% - 100%)  Wt(kg): --      Physical Exam:   General: no acute distress  HEENT: sclera anicteric  Neck: supple  CVS: JVP ~ 7 cm H20, RRR, s1, s2, systolic murmur  Chest: unlabored respirations, CTAB  Abdomen: non-distended  Extremities: no lower extremity edema b/l  Neuro: awake, alert & oriented  Psych: normal affect        Labs:   12 Jun 2024 05:30    136    |  98     |  66     ----------------------------<  106    3.3     |  25     |  2.30     Ca    8.3        12 Jun 2024 05:30  Phos  2.9       12 Jun 2024 05:30  Mg     1.9       12 Jun 2024 05:30    TPro  6.6    /  Alb  2.6    /  TBili  3.1    /  DBili  x      /  AST  166    /  ALT  142    /  AlkPhos  486    12 Jun 2024 05:30                          11.0   5.08  )-----------( 81       ( 12 Jun 2024 05:30 )             33.3           TTE (4/7/24):  LVEF 25-30%, mild LVH  Mildly enlarged right ventricle.  Mild-moderate MR, Moderate TR, Mild AR, Mild OH    TTE (6/6/24):  LVEF < 20%  Severely enlarged RV with severely reduced RV systolic function  RV pressure/volume overload  Moderate MR, Moderate-severe TR, Mild AR  Mild pulmonary hypertension       ECG (6/6/24): sinus bradycardia, first degree AV block, RBBB, anteroseptal/anterior infarct (similar to prior ECG)

## 2024-06-13 NOTE — PROCEDURAL SAFETY CHECKLIST WITH OR WITHOUT SEDATION - NSPREVERIFYSED_GEN_ALL_CORE
History  Chief Complaint   Patient presents with    Dizziness     Pt reports shes been getting hot and dizzy and was told by her dr to come get seen  40 y o  female presents with multiple episodes of lightheadedness  Patient states "my head feels like heavy" and "I get lightheaded "  Patient denies any syncopal episodes  Patient denies any vertiginous symptoms or imbalance  Patient states this occurred 3 times lasting few minutes around 1500 hours over a period of 2-3 hours  Patient states she was sitting in the car during this time  Patient notes that these occurred after her iron infusion that occurred this morning around 1000 hours  Patient states she woke up this morning "bursting energy" and subsequently became quickly fatigued that has persisted throughout the day  Patient notes these episodes began 5-6 months ago when she started to have heavy menstrual cycles  Patient was found to be anemic at that time thought to be secondary to fibroid  Patient has follow-up with OBGYN and planned partial hysterectomy in the future  Since that time, patient has had iron infusions  Patient followed recent with Cardiology with Holter monitoring without etiology identified of her symptoms  Patient has plans for 30 day monitoring but has not started this yet  Patient states she occasionally gets paresthesias in her bilateral hands that she shakes to improved symptoms but denies this today or on evaluation  Patient denies any sensory changes today or any motor weakness  The patient denies any history of congestive heart failure, valvular head disease, coronary artery disease, venous thrombotic disease, or cardiac arrhythmia  Patient denies any recent change in medications  Patient denies any use of alcohol or illicit drugs  Patient denies any family history of sudden cardiac death  The patient denies any recent illness  The patient denies any recent trauma      Objective:  PHYSICAL EXAM:  Constitutional :  Non-toxic  Well developed, well-nourished with no acute distress  Eyes:  PERRL, EOMI  No resting nystagmus or with gaze fixation  HENT: Atraumatic  Neck: no carotid bruit, no tenderness to palpitation  CV:  Regular rate and rhythm, no murmur  Peripheral pulses intact and equal   Respiratory:  Lungs clear to auscultation bilaterally  Abdomen:  Soft, non-tender, non-distended  Back:  No vertebral tenderness  Skin:  Normal color, warm and dry  Extremities:  Non-tender, no pedal edema  Neuro:  Alert and answers questions appropriately  Normal gait  Normal Romberg exam   No pronator drift  Normal finger to nose  Normal fine motor function with rapid finger movements  Normal hand tap  Cranial nerves II through XII grossly intact  Visual fields grossly intact  Upper and lower motor strength 5/5 and symmetric  Normal light touch sensory exam    Normal DTRs  MDM  Patient presenting with syncope with a broad differential     EKG obtained and demonstrates short CT interval of 106 with sinus bradycardia at a rate of 56 with an associated delta wave most notable in lead III but lesser in lead II  QRS appears normal though so doesn't meet clear diagnostic criteria for WPW, will discuss with Cardiology  CBC will be obtained to evaluate hemoglobin and hematocrit to identify potential for anemia (specifically hematocrit less than 30) due to high risk factor  Will place patient on cardiac monitor and reassess  History provided by:  Patient  Dizziness  Quality:  Lightheadedness  Severity:  Moderate  Onset quality:  Sudden  Duration: minutes  Timing:  Intermittent  Progression:  Resolved  Relieved by:  Nothing  Worsened by:   Movement  Ineffective treatments:  None tried  Associated symptoms: no blood in stool, no chest pain, no diarrhea, no headaches, no hearing loss, no nausea, no palpitations, no shortness of breath, no syncope, no tinnitus, no vision changes, no vomiting and no weakness        Prior to Admission Medications   Prescriptions Last Dose Informant Patient Reported? Taking? Bacillus Coagulans-Inulin (PROBIOTIC) 1-250 BILLION-MG CAPS   Yes No   Sig: Take 1 capsule by mouth   Cholecalciferol (Vitamin D) 125 MCG (5000 UT) CAPS   No No   Sig: Take 1 capsule by mouth daily   cyclobenzaprine (FLEXERIL) 5 mg tablet   No No   Sig: Take 1 tablet (5 mg total) by mouth 3 (three) times a day as needed for muscle spasms for up to 5 days   hydrocortisone 2 5 % lotion   No No   Sig: Apply topically 2 (two) times a day for 10 days   mupirocin (BACTROBAN) 2 % ointment   No No   Sig: Apply topically 3 (three) times a day   naproxen (NAPROSYN) 500 mg tablet   No No   Sig: Take 1 tablet (500 mg total) by mouth 2 (two) times a day with meals for 10 days   omeprazole (PriLOSEC) 40 MG capsule   No No   Sig: Take 1 capsule (40 mg total) by mouth daily   sucralfate (CARAFATE) 1 g tablet   No No   Sig: Take 1 tablet (1 g total) by mouth 4 (four) times a day      Facility-Administered Medications: None       Past Medical History:   Diagnosis Date    Anemia     Hypercholesterolemia     Sleep apnea     Vitamin deficiency        Past Surgical History:   Procedure Laterality Date    ECTOPIC PREGNANCY SURGERY      EYE SURGERY      GASTRECTOMY SLEEVE LAPAROSCOPIC         Family History   Problem Relation Age of Onset    Brain cancer Mother     Cancer Father     Asthma Brother     Diabetes Maternal Grandmother     Cancer Maternal Grandmother     Hypertension Paternal Grandmother      I have reviewed and agree with the history as documented  E-Cigarette/Vaping    E-Cigarette Use Never User      E-Cigarette/Vaping Substances    Nicotine No     THC No     CBD No     Flavoring No     Other No     Unknown No      Social History     Tobacco Use    Smoking status: Never Smoker    Smokeless tobacco: Never Used   Vaping Use    Vaping Use: Never used   Substance Use Topics    Alcohol use:  No  Drug use: No       Review of Systems   HENT: Negative for hearing loss and tinnitus  Respiratory: Negative for shortness of breath  Cardiovascular: Negative for chest pain, palpitations and syncope  Gastrointestinal: Negative for blood in stool, diarrhea, nausea and vomiting  Neurological: Positive for dizziness  Negative for weakness and headaches  All other systems reviewed and are negative  Physical Exam  Physical Exam  Vitals reviewed  Constitutional:       Appearance: Normal appearance  She is well-developed  Eyes:      Pupils: Pupils are equal, round, and reactive to light  Cardiovascular:      Rate and Rhythm: Regular rhythm  Bradycardia present  Pulmonary:      Effort: Pulmonary effort is normal    Abdominal:      Palpations: Abdomen is soft  Tenderness: There is no abdominal tenderness  There is no guarding or rebound  Musculoskeletal:         General: No swelling  Cervical back: Neck supple  Skin:     General: Skin is warm and dry  Neurological:      General: No focal deficit present  Mental Status: She is alert and oriented to person, place, and time  Cranial Nerves: No cranial nerve deficit  Sensory: No sensory deficit  Motor: No weakness        Coordination: Coordination normal       Gait: Gait normal       Deep Tendon Reflexes: Reflexes normal    Psychiatric:         Mood and Affect: Mood normal          Vital Signs  ED Triage Vitals   Temperature Pulse Respirations Blood Pressure SpO2   07/19/21 1641 07/19/21 1641 07/19/21 1641 07/19/21 1641 07/19/21 1641   (!) 97 °F (36 1 °C) 55 16 140/65 99 %      Temp Source Heart Rate Source Patient Position - Orthostatic VS BP Location FiO2 (%)   07/19/21 1641 07/19/21 1641 07/19/21 1641 07/19/21 1641 --   Temporal Monitor Sitting Left arm       Pain Score       07/19/21 2130       No Pain           Vitals:    07/19/21 1641 07/19/21 2130 07/19/21 2311   BP: 140/65 139/80 128/79   Pulse: 55 56 (!) 52 Patient Position - Orthostatic VS: Sitting Lying Lying         Visual Acuity      ED Medications  Medications - No data to display    Diagnostic Studies  Results Reviewed     Procedure Component Value Units Date/Time    Troponin I [[id number]]  (Normal) Collected: 07/19/21 1645    Lab Status: Final result Specimen: Blood from Arm, Right Updated: 07/19/21 1713     Troponin I <0 02 ng/mL     Comprehensive metabolic panel [[id number]]  (Abnormal) Collected: 07/19/21 1645    Lab Status: Final result Specimen: Blood from Arm, Right Updated: 07/19/21 1712     Sodium 139 mmol/L      Potassium 3 8 mmol/L      Chloride 102 mmol/L      CO2 29 mmol/L      ANION GAP 8 mmol/L      BUN 11 mg/dL      Creatinine 0 77 mg/dL      Glucose 95 mg/dL      Calcium 8 0 mg/dL      AST 11 U/L      ALT 15 U/L      Alkaline Phosphatase 51 U/L      Total Protein 7 1 g/dL      Albumin 3 5 g/dL      Total Bilirubin 0 27 mg/dL      eGFR 109 ml/min/1 73sq m     Narrative:      Pierre guidelines for Chronic Kidney Disease (CKD):     Stage 1 with normal or high GFR (GFR > 90 mL/min/1 73 square meters)    Stage 2 Mild CKD (GFR = 60-89 mL/min/1 73 square meters)    Stage 3A Moderate CKD (GFR = 45-59 mL/min/1 73 square meters)    Stage 3B Moderate CKD (GFR = 30-44 mL/min/1 73 square meters)    Stage 4 Severe CKD (GFR = 15-29 mL/min/1 73 square meters)    Stage 5 End Stage CKD (GFR <15 mL/min/1 73 square meters)  Note: GFR calculation is accurate only with a steady state creatinine    CBC and differential [[id number]]  (Abnormal) Collected: 07/19/21 1645    Lab Status: Final result Specimen: Blood from Arm, Right Updated: 07/19/21 1650     WBC 5 61 Thousand/uL      RBC 4 74 Million/uL      Hemoglobin 11 2 g/dL      Hematocrit 37 2 %      MCV 79 fL      MCH 23 6 pg      MCHC 30 1 g/dL      RDW 23 9 %      MPV 9 5 fL      Platelets 323 Thousands/uL      nRBC 0 /100 WBCs      Neutrophils Relative 45 %      Immat GRANS % 0 % Lymphocytes Relative 38 %      Monocytes Relative 13 %      Eosinophils Relative 3 %      Basophils Relative 1 %      Neutrophils Absolute 2 51 Thousands/µL      Immature Grans Absolute 0 01 Thousand/uL      Lymphocytes Absolute 2 13 Thousands/µL      Monocytes Absolute 0 75 Thousand/µL      Eosinophils Absolute 0 18 Thousand/µL      Basophils Absolute 0 03 Thousands/µL                  No orders to display              Procedures  Procedures         ED Course  ED Course as of Jul 22 1625   Mon Jul 19, 2021 2150 Discussed with Dr Brianna Herr about the EKG  QRS does not meet criteria as it does not appear to be greater than 120  Will give precautions and have call office in the AM       2254 Patient cardiac monitor unremarkable  There was 1 episode of bradycardia at a rate of 50, otherwise no events noted  2255 Hemoglobin(!): 11 2   2258 Discussed findings with the patient  Emphasized diagnostic uncertainty and the need for continued follow-up  Considering new findings, suggested calling Cardiology in the morning and provided information on this  Emphasized nonexertional activities until seen by them  Discussed risks  Discussed the need for continued follow-up and return precautions in detail  SBIRT 20yo+      Most Recent Value   SBIRT (24 yo +)   In order to provide better care to our patients, we are screening all of our patients for alcohol and drug use  Would it be okay to ask you these screening questions? Yes Filed at: 07/19/2021 2156   Initial Alcohol Screen: US AUDIT-C    1  How often do you have a drink containing alcohol?  0 Filed at: 07/19/2021 2156   2  How many drinks containing alcohol do you have on a typical day you are drinking? 0 Filed at: 07/19/2021 2156   3a  Male UNDER 65: How often do you have five or more drinks on one occasion? 0 Filed at: 07/19/2021 2156   3b  FEMALE Any Age, or MALE 65+:  How often do you have 4 or more drinks on one occassion? 0 Filed at: 07/19/2021 2156   Audit-C Score  0 Filed at: 07/19/2021 2156   JERMAINE: How many times in the past year have you    Used an illegal drug or used a prescription medication for non-medical reasons? Never Filed at: 07/19/2021 2156                    MDM    Disposition  Final diagnoses:   Lightheadedness     Time reflects when diagnosis was documented in both MDM as applicable and the Disposition within this note     Time User Action Codes Description Comment    7/19/2021 10:52 PM Jimenasharon Galanding Add [[id number]] [facility]       ED Disposition     ED Disposition Condition Date/Time Comment    Discharge Stable Mon Jul 19, 2021 10:52 PM Paulie Barrientos discharge to home/self care  Follow-up Information     Follow up With Specialties Details Why Contact Info Additional Information    Jorge Palacios MD Cardiology [place] in 1 day Call arm to discussed timing for follow-up and reassessment   [address] Emergency Department Emergency Medicine Go to  If symptoms worsen [address] Emergency Department, [address] [place] [address], [id number]          Discharge Medication List as of 7/19/2021 10:53 PM      CONTINUE these medications which have NOT CHANGED    Details   Bacillus Coagulans-Inulin (PROBIOTIC) 1-250 BILLION-MG CAPS Take 1 capsule by mouth, Historical Med      Cholecalciferol (Vitamin D) 125 MCG (5000 UT) CAPS Take 1 capsule by mouth daily, Starting Sun 5/9/2021, Until Sat 8/7/2021, Normal      cyclobenzaprine (FLEXERIL) 5 mg tablet Take 1 tablet (5 mg total) by mouth 3 (three) times a day as needed for muscle spasms for up to 5 days, Starting Wed 7/7/2021, Until Thu 7/15/2021 at 2359, Normal      hydrocortisone 2 5 % lotion Apply topically 2 (two) times a day for 10 days, Starting Tue 7/13/2021, Until Fri 7/23/2021, Normal      mupirocin (BACTROBAN) 2 % ointment Apply topically 3 (three) times a day, Starting Tue 7/13/2021, Normal      naproxen (NAPROSYN) 500 mg tablet Take 1 tablet (500 mg total) by mouth 2 (two) times a day with meals for 10 days, Starting Wed 7/7/2021, Until Sat 7/17/2021, Normal      omeprazole (PriLOSEC) 40 MG capsule Take 1 capsule (40 mg total) by mouth daily, Starting Wed 7/7/2021, Until Fri 8/6/2021, Normal      sucralfate (CARAFATE) 1 g tablet Take 1 tablet (1 g total) by mouth 4 (four) times a day, Starting Fri 4/30/2021, Print           No discharge procedures on file      PDMP Review     None          ED Provider  Electronically Signed by           Wilmar Paiz MD  07/22/21 1950 done

## 2024-06-13 NOTE — PROGRESS NOTE ADULT - SUBJECTIVE AND OBJECTIVE BOX
Critical Care Progress Note  Patient seen and examined at bedside. Patient is seen up next to bedside, saturating well on room air. No apparent distress.     CHIEF COMPLAINT: Patient is a 79y old  Male who presents with a chief complaint of hypotension, elevated trop (12 Jun 2024 08:56)    patient seen and examined today  feels well   no complaints      PMH & PSH  PAST MEDICAL & SURGICAL HISTORY:  BPH (benign prostatic hyperplasia)  Bipolar disorder  Depression  Anxiety  Umbilical hernia, incarcerated  Constipation  Urinary retention  CAD (coronary artery disease)  SCC (squamous cell carcinoma)  Lung mass  CHF, chronic  Heart failure with reduced ejection fraction  History of arthroscopy of knee Right, 1987  History of tonsillectomy 1952  History of hernia repair  H/O coronary angiogram    SOCIAL HISTORY: Negative  ALLERGIES: No Known Allergies    HOME MEDICATIONS  Home Medications:  aspirin 81 mg oral delayed release tablet: 1 tab(s) orally once a day (06 Jun 2024 02:59)  clopidogrel 75 mg oral tablet: 1 tab(s) orally once a day (06 Jun 2024 02:59)  FLUoxetine (Eqv-PROzac) 20 mg oral tablet: 1 tab(s) orally every 3 days (06 Jun 2024 02:59)  latanoprost 0.005% ophthalmic solution: 1 drop(s) in each eye once a day (06 Jun 2024 02:59)  mirtazapine 15 mg oral tablet: 1 tab(s) orally once a day (at bedtime) (06 Jun 2024 02:59)  temazepam 15 mg oral capsule: 1 cap(s) orally once a day (at bedtime) as needed for  insomnia (06 Jun 2024 02:59)    MEDICATIONS  STANDING MEDICATIONS  aspirin enteric coated 81 milliGRAM(s) Oral daily  buMETAnide Injectable 1 milliGRAM(s) IV Push two times a day  chlorhexidine 2% Cloths 1 Application(s) Topical <User Schedule>  clopidogrel Tablet 75 milliGRAM(s) Oral daily  DOBUTamine Infusion 3 MICROgram(s)/kG/Min IV Continuous <Continuous>  FLUoxetine 20 milliGRAM(s) Oral <User Schedule>  pantoprazole    Tablet 40 milliGRAM(s) Oral before breakfast  potassium chloride    Tablet ER 20 milliEquivalent(s) Oral every 4 hours  rosuvastatin 20 milliGRAM(s) Oral at bedtime  sodium bicarbonate 650 milliGRAM(s) Oral three times a day  tamsulosin 0.8 milliGRAM(s) Oral at bedtime  vancomycin  IVPB      vancomycin  IVPB 1000 milliGRAM(s) IV Intermittent every 24 hours    PRN MEDICATIONS  acetaminophen     Tablet .. 650 milliGRAM(s) Oral every 6 hours PRN  ALPRAZolam 0.125 milliGRAM(s) Oral at bedtime PRN  aluminum hydroxide/magnesium hydroxide/simethicone Suspension 30 milliLiter(s) Oral every 4 hours PRN  melatonin 3 milliGRAM(s) Oral at bedtime PRN  ondansetron Injectable 4 milliGRAM(s) IV Push every 8 hours PRN  oxymetazoline 0.05% Nasal Spray 2 Spray(s) Both Nostrils every 12 hours PRN      OVERNIGHT EVENTS/INTERVAL UPDATES:  No new events overnight.  Denies SOB/CP.     PHYSICAL EXAM:  GENERAL: Patient is alert, follow simple commands, ambulating, NAD, generalized ecchymosis  HEAD:  Atraumatic   NECK: Supple, symmetrical, tracheal midline, no JVD   CHEST/LUNG: CTA B/L with bilateral air entry  HEART: S1/S2, RRR, no mursmurs  ABDOMEN: Soft, Nontender, Nondistended; Bowel sounds present  EXTREMITIES:  2+ Peripheral Pulses, No clubbing, cyanosis, or edema                 VITALS:  Ins and Outs   T(C): 36.6 (06-12-24 @ 07:00), Max: 37.2 (06-11-24 @ 09:00)  06-13    136  |  99  |  53<H>  ----------------------------<  95  3.2<L>   |  26  |  2.08<H>    Ca    8.2<L>      13 Jun 2024 05:00  Phos  2.6     06-13  Mg     1.8     06-13    TPro  6.7  /  Alb  2.6<L>  /  TBili  2.8<H>  /  DBili  x   /  AST  141<H>  /  ALT  121<H>  /  AlkPhos  474<H>  06-13  T(F): 97.9 (06-12-24 @ 07:00), Max: 98.9 (06-11-24 @ 09:00)  HR: 89 (06-12-24 @ 10:00) (64 - 89)  BP: 99/67 (06-12-24 @ 10:00) (81/63 - 119/61)  BP(mean): 76 (06-12-24 @ 10:00) (64 - 87)  RR: 18 (06-12-24 @ 10:00)  SpO2: 98% (06-12-24 @ 10:00)                            10.9   5.28  )-----------( 74       ( 13 Jun 2024 05:00 )             32.7   06-11-24 @ 07:01  -  06-12-24 @ 07:00  --------------------------------------------------------  IN:    DOBUTamine: 64.2 mL  Total IN: 64.2 mL    OUT:    Voided (mL): 2550 mL  Total OUT: 2550 mL    Total NET: -2485.8 mL      06-12-24 @ 07:01  -  06-12-24 @ 10:32  --------------------------------------------------------  IN:    DOBUTamine: 8.4 mL    IV PiggyBack: 100 mL    Oral Fluid: 300 mL  Total IN: 408.4 mL    OUT:    Voided (mL): 300 mL  Total OUT: 300 mL    Total NET: 108.4 mL                 Critical Care Progress Note  Patient seen and examined at bedside. Patient is seen up next to bedside, saturating well on room air. No apparent distress.     CHIEF COMPLAINT: Patient is a 79y old  Male who presents with a chief complaint of hypotension, elevated trop (12 Jun 2024 08:56)    patient seen and examined today  feels well   no complaints      PMH & PSH  PAST MEDICAL & SURGICAL HISTORY:  BPH (benign prostatic hyperplasia)  Bipolar disorder  Depression  Anxiety  Umbilical hernia, incarcerated  Constipation  Urinary retention  CAD (coronary artery disease)  SCC (squamous cell carcinoma)  Lung mass  CHF, chronic  Heart failure with reduced ejection fraction  History of arthroscopy of knee Right, 1987  History of tonsillectomy 1952  History of hernia repair  H/O coronary angiogram    SOCIAL HISTORY: Negative  ALLERGIES: No Known Allergies    HOME MEDICATIONS  Home Medications:  aspirin 81 mg oral delayed release tablet: 1 tab(s) orally once a day (06 Jun 2024 02:59)  clopidogrel 75 mg oral tablet: 1 tab(s) orally once a day (06 Jun 2024 02:59)  FLUoxetine (Eqv-PROzac) 20 mg oral tablet: 1 tab(s) orally every 3 days (06 Jun 2024 02:59)  latanoprost 0.005% ophthalmic solution: 1 drop(s) in each eye once a day (06 Jun 2024 02:59)  mirtazapine 15 mg oral tablet: 1 tab(s) orally once a day (at bedtime) (06 Jun 2024 02:59)  temazepam 15 mg oral capsule: 1 cap(s) orally once a day (at bedtime) as needed for  insomnia (06 Jun 2024 02:59)    MEDICATIONS  STANDING MEDICATIONS  aspirin enteric coated 81 milliGRAM(s) Oral daily  buMETAnide Injectable 1 milliGRAM(s) IV Push two times a day  chlorhexidine 2% Cloths 1 Application(s) Topical <User Schedule>  clopidogrel Tablet 75 milliGRAM(s) Oral daily  DOBUTamine Infusion 3 MICROgram(s)/kG/Min IV Continuous <Continuous>  FLUoxetine 20 milliGRAM(s) Oral <User Schedule>  pantoprazole    Tablet 40 milliGRAM(s) Oral before breakfast  potassium chloride    Tablet ER 20 milliEquivalent(s) Oral every 4 hours  rosuvastatin 20 milliGRAM(s) Oral at bedtime  sodium bicarbonate 650 milliGRAM(s) Oral three times a day  tamsulosin 0.8 milliGRAM(s) Oral at bedtime  vancomycin  IVPB      vancomycin  IVPB 1000 milliGRAM(s) IV Intermittent every 24 hours    PRN MEDICATIONS  acetaminophen     Tablet .. 650 milliGRAM(s) Oral every 6 hours PRN  ALPRAZolam 0.125 milliGRAM(s) Oral at bedtime PRN  aluminum hydroxide/magnesium hydroxide/simethicone Suspension 30 milliLiter(s) Oral every 4 hours PRN  melatonin 3 milliGRAM(s) Oral at bedtime PRN  ondansetron Injectable 4 milliGRAM(s) IV Push every 8 hours PRN  oxymetazoline 0.05% Nasal Spray 2 Spray(s) Both Nostrils every 12 hours PRN      OVERNIGHT EVENTS/INTERVAL UPDATES:  No new events overnight.  Denies SOB/CP.     PHYSICAL EXAM:  GENERAL: Patient is alert, follow simple commands, ambulating, NAD, generalized ecchymosis  HEAD:  Atraumatic   NECK: Supple, symmetrical, tracheal midline, no JVD   CHEST/LUNG: CTA B/L with bilateral air entry  HEART: S1/S2, RRR, no mursmurs  ABDOMEN: Soft, Nontender, Nondistended; Bowel sounds present  EXTREMITIES:  2+ Peripheral Pulses, No clubbing, cyanosis, or edema              VITALS:  Ins and Outs   T(C): 36.6 (06-12-24 @ 07:00), Max: 37.2 (06-11-24 @ 09:00)  06-13    136  |  99  |  53<H>  ----------------------------<  95  3.2<L>   |  26  |  2.08<H>    Ca    8.2<L>      13 Jun 2024 05:00  Phos  2.6     06-13  Mg     1.8     06-13      COVID  05-26-24 @ 14:58  COVID -   NotDetec  12-22-23 @ 21:20  COVID -   NotDetec  12-18-23 @ 15:55  COVID -   NotDetec  02-15-23 @ 15:50  COVID -   NotDetec  01-03-23 @ 17:32  COVID -   NotDetec  08-22-22 @ 18:50  COVID -   NotDetec  05-26-22 @ 06:36  COVID -   NotDetec  05-19-22 @ 18:21  COVID -   NotDetec  05-11-22 @ 11:40  COVID -   NotDete      COVID Biomarkers    05-28-24 @ 19:40 ESR --  ---  CRP --  ---  DDimer  682<H>   ---   LDH --   ---   Ferritin --    05-28-24 @ 06:47 ESR --  ---  CRP --  ---  DDimer  --   ---   <H>   ---   Ferritin 93            Trend Cardiac Enzymes    Trend BNP  06-08-24 @ 05:40   -  6057<H>  06-06-24 @ 01:00   -  5254<H>  05-26-24 @ 14:25   -  5050<H>  05-22-24 @ 14:31   -  4697<H>  04-19-24 @ 18:15   -  6357<H>  04-10-24 @ 05:54   -  4956<H>    Procalcitonin Trend    WBC Trend  06-13-24 @ 05:00   -  5.28  06-12-24 @ 05:30   -  5.08  06-11-24 @ 05:00   -  5.27    H/H Trend  06-13-24 @ 05:00   -   10.9<L>/ 32.7<L>  06-12-24 @ 05:30   -   11.0<L>/ 33.3<L>  06-11-24 @ 05:00   -   12.0<L>/ 35.2<L>  06-10-24 @ 05:00   -   11.7<L>/ 33.8<L>  06-09-24 @ 05:15   -   11.3<L>/ 32.8<L>  06-08-24 @ 05:40   -   11.3<L>/ 32.4<L>    Stool Occult Blood    Platelet Trend  06-13-24 @ 05:00   -  74<L>  06-12-24 @ 05:30   -  81<L>  06-11-24 @ 05:00   -  89<L>    Trend Sodium  06-13-24 @ 05:00   -  136  06-12-24 @ 05:30   -  136  06-11-24 @ 05:00   -  134<L>    Trend Potassium  06-13-24 @ 05:00   -  3.2<L>  06-12-24 @ 05:30   -  3.3<L>  06-11-24 @ 05:00   -  4.0    Trend Bun/Cr  06-13-24 @ 05:00  BUN/CR -  53<H> / 2.08<H>  06-12-24 @ 05:30  BUN/CR -  66<H> / 2.30<H>  06-11-24 @ 05:00  BUN/CR -  71<H> / 2.33<H>    Lactic Acid Trend  06-06-24 @ 05:00   -   1.9  06-06-24 @ 01:00   -   2.7<H>    ABG Trend  06-06-24 @ 01:20   - 7.39/21<L>/117<H>/98.7<H>  05-28-24 @ 18:17   - 7.50<H>/23<L>/118<H>/98.3<H>  09-29-22 @ 04:33   - 7.46<H>/33<L>/77<L>/95.7  09-28-22 @ 20:02   - 7.51<H>/30<L>/103/98.7<H>  09-27-22 @ 04:20   - 7.49<H>/32<L>/136<H>/98.2<H>  09-25-22 @ 20:52   - 7.49<H>/31<L>/101/97.6  09-25-22 @ 04:45   - 7.45/35/85/96.3  09-24-22 @ 22:00   - 7.47<H>/33<L>/83/96.2  09-24-22 @ 16:40   - 7.51<H>/29<L>/83/95.8  09-24-22 @ 10:00   - 7.46<H>/30<L>/75<L>/94.1  09-24-22 @ 03:10   - 7.46<H>/29<L>/140<H>/97.9  09-23-22 @ 23:30   - 7.44/31<L>/97/98.0    Trend AST/ALT/ALK Phos/Bili  06-13-24 @ 05:00   141<H>/121<H>/474<H>/2.8<H>  06-12-24 @ 05:30   166<H>/142<H>/486<H>/3.1<H>  06-11-24 @ 05:00   239<H>/174<H>/528<H>/3.6<H>  06-08-24 @ 05:40   304<H>/204<H>/445<H>/3.7<H>  06-07-24 @ 06:01   287<H>/192<H>/442<H>/4.1<H>  06-06-24 @ 08:32   177<H>/140<H>/415<H>/3.4<H>  06-06-24 @ 01:00   176<H>/134<H>/444<H>/3.4<H>  05-30-24 @ 07:50   117<H>/102<H>/422<H>/3.0<H>  05-29-24 @ 06:54   110<H>/88<H>/365<H>/3.1<H>  05-28-24 @ 06:47   115<H>/73<H>/372<H>/2.9<H>  05-27-24 @ 06:52   90<H>/63<H>/316<H>/3.0<H>  05-26-24 @ 14:25   76<H>/38/317<H>/3.4<H>      Ammonia Trend  06-06-24 @ 16:50   -   26      Amylase / Lipase Trend  05-23-24 @ 10:42   -   -- / 40  04-19-24 @ 14:45   -   -- / 39      Albumin Trend  06-13-24 @ 05:00   -   2.6<L>  06-12-24 @ 05:30   -   2.6<L>  06-11-24 @ 05:00   -   2.8<L>  06-08-24 @ 05:40   -   2.7<L>  06-07-24 @ 06:01   -   2.8<L>  06-06-24 @ 08:32   -   2.8<L>      PTT - PT - INR Trend  05-30-24 @ 07:50   -   31.7 - -- - --  05-29-24 @ 06:54   -   -- - 15.8<H> - 1.36<H>  04-19-24 @ 14:45   -   34.7 - 12.2 - 1.04  12-22-23 @ 16:35   -   29.7 - 12.7 - 1.12  01-09-23 @ 13:32   -   29.4 - 12.5 - 1.08  01-03-23 @ 17:32   -   29.5 - 15.6<H> - 1.34<H>    Glucose Trend  06-13-24 @ 05:00   -  95 -- --  06-12-24 @ 05:30   -  106<H> -- --  06-11-24 @ 05:00   -  96 -- --          TPro  6.7  /  Alb  2.6<L>  /  TBili  2.8<H>  /  DBili  x   /  AST  141<H>  /  ALT  121<H>  /  AlkPhos  474<H>  06-13  T(F): 97.9 (06-12-24 @ 07:00), Max: 98.9 (06-11-24 @ 09:00)  HR: 89 (06-12-24 @ 10:00) (64 - 89)  BP: 99/67 (06-12-24 @ 10:00) (81/63 - 119/61)  BP(mean): 76 (06-12-24 @ 10:00) (64 - 87)  RR: 18 (06-12-24 @ 10:00)  SpO2: 98% (06-12-24 @ 10:00)                            10.9   5.28  )-----------( 74       ( 13 Jun 2024 05:00 )             32.7   06-11-24 @ 07:01  -  06-12-24 @ 07:00  --------------------------------------------------------  IN:    DOBUTamine: 64.2 mL  Total IN: 64.2 mL    OUT:    Voided (mL): 2550 mL  Total OUT: 2550 mL    Total NET: -7722.8 mL      06-12-24 @ 07:01  -  06-12-24 @ 10:32  --------------------------------------------------------  IN:    DOBUTamine: 8.4 mL    IV PiggyBack: 100 mL    Oral Fluid: 300 mL  Total IN: 408.4 mL    OUT:    Voided (mL): 300 mL  Total OUT: 300 mL    Total NET: 108.4 mL

## 2024-06-13 NOTE — PROGRESS NOTE ADULT - ASSESSMENT
Assessment: 79 year old man with past medical history of Bipolar disorder, Coronary artery disease (history of STEMI in 2022, with multivessel CAD with partial viability on nuclear), HFrEF (LVEF 35% in May 2022), and Lung cancer (s/p radiation and lobectomy) with recent hospitalization for acute on chronic systolic heart failure now presents with hypotension and lethargy, found to have signs of end stage heart failure and cardiorenal syndrome.     ECG with sinus bradycardia, RBBB and old anteroseptal/anterior infarct, similar to prior ECG. Troponins elevated and have peaked, likely secondary to renal disease. Echo report with LVEF < 20%, severely reduced RV systolic function, moderate-severe tricuspid regurgitation. Prior coronary angiogram from 2022 with normal left main, 70% stenosis of LAD, 70% stenosis of first diagonal, normal LCx and 100% stenosis of RCA that was medically managed after viability testing indicated mostly infarcted tissue.    Recommendations:  - pt is not a candidate for further GDMT due to CKD  - as per prior discussion with oncologist and HF attg, pt not a candidate for advanced therapies. recommended home dobutamine gtt for palliative support. pt denies hospice at this time but is amenable to visiting nurse services. pt to receive PICC today for infusion. DW ICU attg, will be followed up with case management for arranging home services       Assessment: 79 year old man with past medical history of Bipolar disorder, Coronary artery disease (history of STEMI in 2022, with multivessel CAD with partial viability on nuclear), HFrEF (LVEF 35% in May 2022), and Lung cancer (s/p radiation and lobectomy) with recent hospitalization for acute on chronic systolic heart failure now presents with hypotension and lethargy, found to have signs of end stage heart failure and cardiorenal syndrome.     ECG with sinus bradycardia, RBBB and old anteroseptal/anterior infarct, similar to prior ECG. Troponins elevated and have peaked, likely secondary to renal disease. Echo report with LVEF < 20%, severely reduced RV systolic function, moderate-severe tricuspid regurgitation. Prior coronary angiogram from 2022 with normal left main, 70% stenosis of LAD, 70% stenosis of first diagonal, normal LCx and 100% stenosis of RCA that was medically managed after viability testing indicated mostly infarcted tissue.    Recommendations:  - pt is not a candidate for further GDMT due to CKD  - as per prior discussion with oncologist and HF attg, pt not a candidate for advanced therapies. recommended home dobutamine gtt for palliative support. pt denies hospice at this time but is amenable to visiting nurse services. pt to receive PICC today for infusion. DW ICU attg, will be followed up with case management for arranging home services  - dw pt and wife the importance of close HF followup, states pt has an appt with Dr Shell on 7/8/24

## 2024-06-14 ENCOUNTER — TRANSCRIPTION ENCOUNTER (OUTPATIENT)
Age: 79
End: 2024-06-14

## 2024-06-14 VITALS
OXYGEN SATURATION: 96 % | HEART RATE: 86 BPM | TEMPERATURE: 98 F | RESPIRATION RATE: 18 BRPM | DIASTOLIC BLOOD PRESSURE: 82 MMHG | SYSTOLIC BLOOD PRESSURE: 100 MMHG

## 2024-06-14 LAB
ALBUMIN SERPL ELPH-MCNC: 2.6 G/DL — LOW (ref 3.3–5)
ALP SERPL-CCNC: 450 U/L — HIGH (ref 40–120)
ALT FLD-CCNC: 107 U/L — HIGH (ref 10–45)
ANION GAP SERPL CALC-SCNC: 10 MMOL/L — SIGNIFICANT CHANGE UP (ref 5–17)
AST SERPL-CCNC: 115 U/L — HIGH (ref 10–40)
BILIRUB SERPL-MCNC: 3 MG/DL — HIGH (ref 0.2–1.2)
BUN SERPL-MCNC: 45 MG/DL — HIGH (ref 7–23)
CALCIUM SERPL-MCNC: 8.4 MG/DL — SIGNIFICANT CHANGE UP (ref 8.4–10.5)
CHLORIDE SERPL-SCNC: 98 MMOL/L — SIGNIFICANT CHANGE UP (ref 96–108)
CO2 SERPL-SCNC: 26 MMOL/L — SIGNIFICANT CHANGE UP (ref 22–31)
CREAT SERPL-MCNC: 2.04 MG/DL — HIGH (ref 0.5–1.3)
EGFR: 33 ML/MIN/1.73M2 — LOW
GLUCOSE SERPL-MCNC: 89 MG/DL — SIGNIFICANT CHANGE UP (ref 70–99)
HCT VFR BLD CALC: 32.5 % — LOW (ref 39–50)
HGB BLD-MCNC: 10.7 G/DL — LOW (ref 13–17)
MAGNESIUM SERPL-MCNC: 1.9 MG/DL — SIGNIFICANT CHANGE UP (ref 1.6–2.6)
MCHC RBC-ENTMCNC: 30.1 PG — SIGNIFICANT CHANGE UP (ref 27–34)
MCHC RBC-ENTMCNC: 32.9 GM/DL — SIGNIFICANT CHANGE UP (ref 32–36)
MCV RBC AUTO: 91.5 FL — SIGNIFICANT CHANGE UP (ref 80–100)
NRBC # BLD: 0 /100 WBCS — SIGNIFICANT CHANGE UP (ref 0–0)
PHOSPHATE SERPL-MCNC: 2.2 MG/DL — LOW (ref 2.5–4.5)
PLATELET # BLD AUTO: 62 K/UL — LOW (ref 150–400)
POTASSIUM SERPL-MCNC: 3.4 MMOL/L — LOW (ref 3.5–5.3)
POTASSIUM SERPL-SCNC: 3.4 MMOL/L — LOW (ref 3.5–5.3)
PROT SERPL-MCNC: 6.6 G/DL — SIGNIFICANT CHANGE UP (ref 6–8.3)
RBC # BLD: 3.55 M/UL — LOW (ref 4.2–5.8)
RBC # FLD: 16.8 % — HIGH (ref 10.3–14.5)
SODIUM SERPL-SCNC: 134 MMOL/L — LOW (ref 135–145)
WBC # BLD: 5.49 K/UL — SIGNIFICANT CHANGE UP (ref 3.8–10.5)
WBC # FLD AUTO: 5.49 K/UL — SIGNIFICANT CHANGE UP (ref 3.8–10.5)

## 2024-06-14 PROCEDURE — 99232 SBSQ HOSP IP/OBS MODERATE 35: CPT

## 2024-06-14 RX ORDER — POTASSIUM CHLORIDE 20 MEQ
20 PACKET (EA) ORAL ONCE
Refills: 0 | Status: DISCONTINUED | OUTPATIENT
Start: 2024-06-14 | End: 2024-06-14

## 2024-06-14 RX ORDER — MAGNESIUM SULFATE 500 MG/ML
2 VIAL (ML) INJECTION ONCE
Refills: 0 | Status: COMPLETED | OUTPATIENT
Start: 2024-06-14 | End: 2024-06-14

## 2024-06-14 RX ORDER — DOBUTAMINE HCL 250MG/20ML
4.2 VIAL (ML) INTRAVENOUS
Qty: 0 | Refills: 0 | DISCHARGE
Start: 2024-06-14

## 2024-06-14 RX ORDER — ZOLPIDEM TARTRATE 10 MG/1
1 TABLET ORAL
Qty: 0 | Refills: 0 | DISCHARGE
Start: 2024-06-14

## 2024-06-14 RX ORDER — PANTOPRAZOLE SODIUM 20 MG/1
1 TABLET, DELAYED RELEASE ORAL
Qty: 0 | Refills: 0 | DISCHARGE
Start: 2024-06-14

## 2024-06-14 RX ORDER — TAMSULOSIN HYDROCHLORIDE 0.4 MG/1
2 CAPSULE ORAL
Qty: 0 | Refills: 0 | DISCHARGE
Start: 2024-06-14

## 2024-06-14 RX ORDER — POTASSIUM CHLORIDE 20 MEQ
20 PACKET (EA) ORAL ONCE
Refills: 0 | Status: COMPLETED | OUTPATIENT
Start: 2024-06-14 | End: 2024-06-14

## 2024-06-14 RX ORDER — POTASSIUM PHOSPHATE, MONOBASIC POTASSIUM PHOSPHATE, DIBASIC 236; 224 MG/ML; MG/ML
30 INJECTION, SOLUTION INTRAVENOUS ONCE
Refills: 0 | Status: COMPLETED | OUTPATIENT
Start: 2024-06-14 | End: 2024-06-14

## 2024-06-14 RX ADMIN — Medication 20 MILLIEQUIVALENT(S): at 13:55

## 2024-06-14 RX ADMIN — Medication 81 MILLIGRAM(S): at 12:42

## 2024-06-14 RX ADMIN — CHLORHEXIDINE GLUCONATE 1 APPLICATION(S): 213 SOLUTION TOPICAL at 05:42

## 2024-06-14 RX ADMIN — Medication 25 GRAM(S): at 09:46

## 2024-06-14 RX ADMIN — PANTOPRAZOLE SODIUM 40 MILLIGRAM(S): 20 TABLET, DELAYED RELEASE ORAL at 05:42

## 2024-06-14 RX ADMIN — BUMETANIDE 1 MILLIGRAM(S): 0.25 INJECTION INTRAMUSCULAR; INTRAVENOUS at 13:54

## 2024-06-14 RX ADMIN — Medication 20 MILLIGRAM(S): at 09:13

## 2024-06-14 RX ADMIN — Medication 250 MILLIGRAM(S): at 09:47

## 2024-06-14 RX ADMIN — POTASSIUM PHOSPHATE, MONOBASIC POTASSIUM PHOSPHATE, DIBASIC 85 MILLIMOLE(S): 236; 224 INJECTION, SOLUTION INTRAVENOUS at 09:46

## 2024-06-14 RX ADMIN — BUMETANIDE 1 MILLIGRAM(S): 0.25 INJECTION INTRAMUSCULAR; INTRAVENOUS at 05:42

## 2024-06-14 RX ADMIN — CLOPIDOGREL BISULFATE 75 MILLIGRAM(S): 75 TABLET, FILM COATED ORAL at 12:42

## 2024-06-14 NOTE — DISCHARGE NOTE PROVIDER - NSDCFUSCHEDAPPT_GEN_ALL_CORE_FT
Usama Min  [facility]  FAMILYMED [address]  Scheduled Appointment: 06/18/2024    Niles Walden  [facility]  CARDIOLOGY [address]  Scheduled Appointment: 06/18/2024    [facility]  HEARTFAIL [address]  Scheduled Appointment: 06/21/2024    Que Alejandre  [facility]  RADMED [address] [facility]  Scheduled Appointment: 06/27/2024     [facility] Usama Min  [facility]  FAMILYMED [address]  Scheduled Appointment: 06/18/2024    [facility]  HEARTFAIL [address]  Scheduled Appointment: 06/21/2024    Que Alejandre  [facility]  RADMED [address]  Scheduled Appointment: 06/27/2024    Niles Walden  [facility]  CARDIOLOGY [address]  Scheduled Appointment: 07/02/2024

## 2024-06-14 NOTE — PROGRESS NOTE ADULT - ASSESSMENT
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 In ICU for dobutamine drip    Assessment  1. Acute decompensation of heart failure     not a candidate for RVAD / LVAD based on discussion with cardio  2. CKD with BRODY  3. CAD  4. h/o Lung cancer in non smoker with new lung nodules possible new cancer    Underlying BPH, Bipolar d/o, Depression, Anxiety, CAD (coronary artery disease)      Plan:  Pt for discharge today with home care  will need continuous Dobutamine drip via PICC  Continue PO bumex  over all prognosis is poor  Right Upper Extremity Phlebitis is resolved, OK to stop vanco today       Palliative care following, refused hospice care   GOC DNR/I.     Patient seen and evaluated with Dr Espinal who agrees with above sated plan of care  Eife updated and agreeable with discharge plan

## 2024-06-14 NOTE — PROGRESS NOTE ADULT - SUBJECTIVE AND OBJECTIVE BOX
SUBJ:  Patient is a 79y old  Male who presents with a chief complaint of hypotension, elevated trop (13 Jun 2024 23:49)  The patient is seen and examined.  The chart is reviewed.  Patient had restless night but otherwise feeling comfortable.      PAST MEDICAL & SURGICAL HISTORY:  BPH (benign prostatic hyperplasia)      Bipolar disorder      Depression      Anxiety      Umbilical hernia, incarcerated      Constipation      Urinary retention      CAD (coronary artery disease)      SCC (squamous cell carcinoma)      Lung mass      CHF, chronic      Heart failure with reduced ejection fraction      History of arthroscopy of knee  Right, 1987      History of tonsillectomy  1952      History of hernia repair      H/O coronary angiogram          MEDICATIONS  (STANDING):  aspirin enteric coated 81 milliGRAM(s) Oral daily  buMETAnide 1 milliGRAM(s) Oral two times a day  chlorhexidine 2% Cloths 1 Application(s) Topical <User Schedule>  clopidogrel Tablet 75 milliGRAM(s) Oral daily  DOBUTamine Infusion 3 MICROgram(s)/kG/Min (4.21 mL/Hr) IV Continuous <Continuous>  FLUoxetine 20 milliGRAM(s) Oral <User Schedule>  pantoprazole    Tablet 40 milliGRAM(s) Oral before breakfast  rosuvastatin 20 milliGRAM(s) Oral at bedtime  tamsulosin 0.8 milliGRAM(s) Oral at bedtime    MEDICATIONS  (PRN):  acetaminophen     Tablet .. 650 milliGRAM(s) Oral every 6 hours PRN Temp greater or equal to 38C (100.4F), Mild Pain (1 - 3)  ALPRAZolam 0.125 milliGRAM(s) Oral at bedtime PRN insomina  aluminum hydroxide/magnesium hydroxide/simethicone Suspension 30 milliLiter(s) Oral every 4 hours PRN Dyspepsia  melatonin 3 milliGRAM(s) Oral at bedtime PRN Insomnia  ondansetron Injectable 4 milliGRAM(s) IV Push every 8 hours PRN Nausea and/or Vomiting  oxymetazoline 0.05% Nasal Spray 2 Spray(s) Both Nostrils every 12 hours PRN epistatixis  zolpidem 5 milliGRAM(s) Oral at bedtime PRN Insomnia          Vital Signs Last 24 Hrs  T(C): 36.6 (14 Jun 2024 05:00), Max: 36.8 (13 Jun 2024 16:00)  T(F): 97.8 (14 Jun 2024 05:00), Max: 98.2 (13 Jun 2024 16:00)  HR: 88 (14 Jun 2024 06:00) (80 - 92)  BP: 120/79 (14 Jun 2024 06:00) (88/57 - 120/79)  BP(mean): 94 (14 Jun 2024 06:00) (60 - 97)  RR: 18 (14 Jun 2024 06:00) (11 - 24)  SpO2: 98% (14 Jun 2024 06:00) (93% - 100%)    Parameters below as of 14 Jun 2024 05:00  Patient On (Oxygen Delivery Method): room air        REVIEW OF SYSTEMS:  CONSTITUTIONAL: No fever, weight loss, or fatigue  RESPIRATORY: No cough, wheezing, chills or hemoptysis; No shortness of breath  CARDIOVASCULAR: No chest pain or chest pressure.  No shortness of breath or dyspnea on exertion.  No palpitations, dizziness, light headedness, syncope or near syncope.  No edema, no orthopnea.   NEUROLOGICAL: No headaches, memory loss, loss of strength, numbness, or tremors      PHYSICAL EXAM  Constitutional:  WDWN. No acute distress  HEENT: normocephalic, atraumatic.  PERRLA. EOMI  Neck : No JVD. no carotid bruits  Lungs:  clear to auscultation bilaterally. no rhonchi. no wheezing  Heart:  S1 and S2. No S3, S4. I/VI systolic murmur.  Abdomen:  soft, non tender.  Extremities: No clubbing, cyanoisis or edema  Nuerologic:  A+O x 3. No focal deficits  Skin:  no rashes        LABS:                        10.7   5.49  )-----------( 62       ( 14 Jun 2024 05:15 )             32.5     06-14    134<L>  |  98  |  45<H>  ----------------------------<  89  3.4<L>   |  26  |  2.04<H>    Ca    8.4      14 Jun 2024 05:15  Phos  2.2     06-14  Mg     1.9     06-14    TPro  6.6  /  Alb  2.6<L>  /  TBili  3.0<H>  /  DBili  x   /  AST  115<H>  /  ALT  107<H>  /  AlkPhos  450<H>  06-14    I&O's Summary    13 Jun 2024 07:01  -  14 Jun 2024 07:00  --------------------------------------------------------  IN: 216.6 mL / OUT: 850 mL / NET: -633.4 mL    < from: TTE Echo Complete w/o Contrast w/ Doppler (06.06.24 @ 08:29) >   1. Biatrial enlargement   2. Left ventricular ejection fraction, by visual estimation, is <20%.   3. Severely decreased global left ventricular systolic function.   4. Entire septum, entire apex, and mid anterior segment are abnormal as   described above.   5. Increased LV wall thickness.   6. Mildly increased left ventricular internal cavity size.   7. Right ventricular volume and pressure overload.   8. There is mild concentric left ventricular hypertrophy.   9. Severely enlarged right ventricle.  10. Severely reduced RV systolic function.  11. Moderate mitral valve regurgitation.  12. Moderate-severe tricuspid regurgitation.  13. Mild aortic regurgitation.  14. Mild pulmonic valve regurgitation.  15. Estimated pulmonary artery systolic pressure is 44.6 mmHg assuming a   right atrial pressure of 15 mmHg, which is consistent with mild pulmonary   hypertension.  16. LA volume Index is 38.5 ml/m² ml/m2.    < end of copied text >  < from: 12 Lead ECG (06.06.24 @ 01:07) >  Diagnosis Line Sinus bradycardiawith 1st degree AV block  Left axis deviation  Right bundle branch block  Anteroseptal infarct (cited on or before 11-MAY-2022)    When compared with ECG of 26-MAY-2024 14:10,  premature ventricular complexes are no longer present    < end of copied text >

## 2024-06-14 NOTE — PROGRESS NOTE ADULT - PROBLEM SELECTOR PLAN 1
Advanced pulmonary neoplasm, never treated systemically, admitted with dyspnea, currently on dobutamine drip in CCU.  Plans for insertion of PICC line prior to discharge on a drip at home.  As discussed with patient's oncologist, no plans for systemic chemotherapy for lung cancer, but rather supportive care.  Patient's wife in agreement.  Case discussed with ICU team. Stage IV lung carcinoma, s/p local RT, never on systemic chemotherapy, admitted with dyspnea, CHF exacerbation, requiring dobutamine drip and CCU monitoring.  Patient awaiting for discharge possibly on dobutamine drip per cardiology.  Will follow-up with oncology in ambulatory.

## 2024-06-14 NOTE — PROGRESS NOTE ADULT - PROVIDER SPECIALTY LIST ADULT
Cardiology
Cardiology
Critical Care
Heme/Onc
Pulmonology
Cardiology
Cardiology
Critical Care
Heme/Onc
Nephrology
Cardiology
Critical Care
Family Medicine
Heme/Onc
Nephrology
Cardiology
Critical Care
Family Medicine
Palliative Care
Heme/Onc
Heme/Onc
Palliative Care

## 2024-06-14 NOTE — PROGRESS NOTE ADULT - NS ATTEND AMEND GEN_ALL_CORE FT
-  -  79-year-old man in ICU with end-stage heart failure on dobutamine drip.  He has history of coronary artery disease, HFrEF, chronic systolic heart failure.  Medical issues include lung cancer, status post radiation, lobectomy.  Chronic kidney disease.  Previously cardiology team had discussed with heart failure team, and patient is not deemed to be a candidate for advanced therapies.    He has symptomatically improved in the ICU on dobutamine.  He is currently not a candidate for guideline directed medical therapy due to chronic hypotension and kidney disease.  Discharge planning in progress, patient to be discharged home with dobutamine drip and then follow-up with heart failure team.  The above was reviewed with the patient, ICU team and nurse practitioner.
patient to be discharged later today with home dobutamine dirp    plan to f/u with   [facility] - Dr. SANTIAGO Shell  Cardio - Dr. Win cary onc - Dr. Hill Slade onc- Dr. Schwartz  and Me for pulm    overall prognosis poor  risk of re admission high  patient optimized for discharge today.

## 2024-06-14 NOTE — DISCHARGE NOTE PROVIDER - NSDCCPCAREPLAN_GEN_ALL_CORE_FT
PRINCIPAL DISCHARGE DIAGNOSIS  Diagnosis: Acute on chronic renal failure  Assessment and Plan of Treatment:

## 2024-06-14 NOTE — DISCHARGE NOTE PROVIDER - CARE PROVIDERS DIRECT ADDRESSES
[email or web address]

## 2024-06-14 NOTE — PROGRESS NOTE ADULT - ASSESSMENT
79-year-old man in ICU with end-stage heart failure on dobutamine drip.  He has history of coronary artery disease, HFrEF, chronic systolic heart failure.  Medical issues include lung cancer, status post radiation, lobectomy.  Chronic kidney disease.  Previously cardiology team had discussed with heart failure team, and patient is not deemed to be a candidate for advanced therapies.    He has symptomatically improved in the ICU on dobutamine.  s/p PICC yesterday   He is currently not a candidate for guideline directed medical therapy due to chronic hypotension and kidney disease.    Recommendations  -Discharge planning in progress, patient to be discharged home with dobutamine drip and then follow-up with heart failure team.  -The above was reviewed with the patient, ICU team and nurse practitioner.

## 2024-06-14 NOTE — DISCHARGE NOTE PROVIDER - HOSPITAL COURSE
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 Pt was BIBEMS to  ED 6/6 due to lethargy and hypotension. Was admitted to ICU for acute decompensated heart failure in the setting of EF less than 20% with valvular disease and right heart failure, cardiogenic shock with worsening CKD, transaminitis and lactic acidosis. He was seen by cardiology and was deemed not a candidate for RVAD / LVAD based on history of lung cancer with new lung nodules and doutamine initiated. 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 Pt was BIBEMS to  ED 6/6 due to lethargy and hypotension. Was admitted to ICU for acute decompensated heart failure in the setting of EF less than 20% with valvular disease and right heart failure, cardiogenic shock with worsening CKD, transaminitis and lactic acidosis. He was seen by cardiology and was deemed not a candidate for RVAD / LVAD based on history of lung cancer with new lung nodules and dobutamine initiated. He refused hospice. He was aggressively diuresed and followed by nephrology. His course was complicated by a superficial phlebitis for which he was given 4 days of vancomycin. PICC line placed and cleared for discharge with home dobutamine infusion. 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 Pt was BIBEMS to  ED 6/6 due to lethargy and hypotension. Was admitted to ICU for acute decompensated heart failure in the setting of EF less than 20% with valvular disease and right heart failure, cardiogenic shock with worsening CKD, transaminitis and lactic acidosis. He was seen by cardiology and was deemed not a candidate for RVAD / LVAD based on history of lung cancer with new lung nodules and dobutamine initiated. He refused hospice. He was aggressively diuresed and followed by nephrology. His course was complicated by a superficial phlebitis for which he was given 4 days of vancomycin. PICC line placed and cleared for discharge with home dobutamine infusion.     For full account of hospital course please refer to actual medical records. As this is a brief summery.

## 2024-06-14 NOTE — DISCHARGE NOTE NURSING/CASE MANAGEMENT/SOCIAL WORK - PATIENT PORTAL LINK FT
You can access the FollowMyHealth Patient Portal offered by [facility] by registering at the following website: [email or web address]. By joining [employer]’s FollowMyHealth portal, you will also be able to view your health information using other applications (apps) compatible with our system.

## 2024-06-14 NOTE — PROGRESS NOTE ADULT - SUBJECTIVE AND OBJECTIVE BOX
Online Visit    Date/Time: 8/14/2018 11:40:05 AM  To: URVASHI HERNANDEZ  From: FIORELLA MENDEZ  Subject:    You are negative for gonorrhea and chlamydia    Verified Results  CHLAMYDIA / GC BY NUCLEIC ACID AMPLIFICATION [id number] 12:01AM FIORELLA MENDEZ     Test Name Result Flag Reference   CHLAMYDIA TRACHOMATIS BY NUCLEIC ACID AMPLIFICATION NEGATIVE  NEGATIVE   Positive results are reported to the [facility] Department of Public Health.  The Aptima Combo 2 Assay is not intended for the evaluation of suspected sexual abuse or for other medico-legal indications, nor has it been evaluated in adolescents less than 14 years of age.   In these scenarios, and in clinical settings where the prevalence of infection is low, confirmatory testing on positive results is recommended.     The Aptima Combo 2 Assay is not FDA approved for testing on throat, rectal and female urine specimens. [facility] has internally validated these specimen sources. The expected normal reference range is negative.   NEISSERIA GONORRHOEAE BY NUCLEIC ACID AMPLIFICATION NEGATIVE  NEGATIVE   Positive results are reported to the [place] Department of Public Health.  The Aptima Combo 2 Assay is not intended for the evaluation of suspected sexual abuse or for other medico-legal indications, nor has it been evaluated in adolescents less than 14 years of age.   In these scenarios, and in clinical settings where the prevalence of infection is low, confirmatory testing on positive results is recommended.     The Aptima Combo 2 Assay is not FDA approved for testing on throat, rectal and female urine specimens. [facility] has internally validated these specimen sources. The expected normal reference range is negative.   SOURCE URINE          Follow-up Critical Care Progress Note  Chief Complaint : Acute renal failure    Stable overnight, s/p PICC placement. No accute issues       Allergies :No Known Allergies      PAST MEDICAL & SURGICAL HISTORY:  BPH (benign prostatic hyperplasia)  Bipolar disorder  Depression  Anxiety  Umbilical hernia, incarcerated  Constipation  Urinary retention  CAD (coronary artery disease)  SCC (squamous cell carcinoma)  Lung mass  CHF, chronic  Heart failure with reduced ejection fraction  History of arthroscopy of knee Right, 1987  History of tonsillectomy 1952  History of hernia repair  H/O coronary angiogram      Medications:  MEDICATIONS  (STANDING):  aspirin enteric coated 81 milliGRAM(s) Oral daily  buMETAnide 1 milliGRAM(s) Oral two times a day  chlorhexidine 2% Cloths 1 Application(s) Topical <User Schedule>  clopidogrel Tablet 75 milliGRAM(s) Oral daily  DOBUTamine Infusion 3 MICROgram(s)/kG/Min (4.21 mL/Hr) IV Continuous <Continuous>  FLUoxetine 20 milliGRAM(s) Oral <User Schedule>  pantoprazole    Tablet 40 milliGRAM(s) Oral before breakfast  rosuvastatin 20 milliGRAM(s) Oral at bedtime  tamsulosin 0.8 milliGRAM(s) Oral at bedtime    MEDICATIONS  (PRN):  acetaminophen     Tablet .. 650 milliGRAM(s) Oral every 6 hours PRN Temp greater or equal to 38C (100.4F), Mild Pain (1 - 3)  ALPRAZolam 0.125 milliGRAM(s) Oral at bedtime PRN insomina  aluminum hydroxide/magnesium hydroxide/simethicone Suspension 30 milliLiter(s) Oral every 4 hours PRN Dyspepsia  melatonin 3 milliGRAM(s) Oral at bedtime PRN Insomnia  ondansetron Injectable 4 milliGRAM(s) IV Push every 8 hours PRN Nausea and/or Vomiting  oxymetazoline 0.05% Nasal Spray 2 Spray(s) Both Nostrils every 12 hours PRN epistatixis  zolpidem 5 milliGRAM(s) Oral at bedtime PRN Insomnia      Antibiotics History  vancomycin  IVPB 1000 milliGRAM(s) IV Intermittent once, 06-10-24 @ 10:22, Stop order after: 1 Doses  vancomycin  IVPB    , 06-11-24 @ 12:49  vancomycin  IVPB 1000 milliGRAM(s) IV Intermittent once, 06-11-24 @ 09:13, Stop order after: 1 Doses  vancomycin  IVPB 1000 milliGRAM(s) IV Intermittent every 24 hours, 06-12-24 @ 09:13, Stop order after: 3 Days      Heme Medications   aspirin enteric coated 81 milliGRAM(s) Oral daily, 06-06-24 @ 03:30  clopidogrel Tablet 75 milliGRAM(s) Oral daily, 06-06-24 @ 03:34      GI Medications  aluminum hydroxide/magnesium hydroxide/simethicone Suspension 30 milliLiter(s) Oral every 4 hours, 06-06-24 @ 02:58, Routine PRN  pantoprazole    Tablet 40 milliGRAM(s) Oral before breakfast, 06-07-24 @ 15:12, Routine      COVID  05-26-24 @ 14:58  COVID -   NotDetec  12-22-23 @ 21:20  COVID -   NotDetec  12-18-23 @ 15:55  COVID -   NotDetec  02-15-23 @ 15:50  COVID -   NotDetec  01-03-23 @ 17:32  COVID -   NotDetec  08-22-22 @ 18:50  COVID -   NotDetec  05-26-22 @ 06:36  COVID -   NotDetec  05-19-22 @ 18:21  COVID -   NotDetec  05-11-22 @ 11:40  COVID -   NotDetec      COVID Biomarkers    05-28-24 @ 19:40 ESR --  ---  CRP --  ---  DDimer  682<H>   ---   LDH --   ---   Ferritin --    05-28-24 @ 06:47 ESR --  ---  CRP --  ---  DDimer  --   ---   <H>   ---   Ferritin 93          Trend BNP  06-08-24 @ 05:40   -  6057<H>  06-06-24 @ 01:00   -  5254<H>  05-26-24 @ 14:25   -  5050<H>  05-22-24 @ 14:31   -  4697<H>  04-19-24 @ 18:15   -  6357<H>  04-10-24 @ 05:54   -  4956<H>      WBC Trend  06-14-24 @ 05:15   -  5.49  06-13-24 @ 05:00   -  5.28  06-12-24 @ 05:30   -  5.08    H/H Trend  06-14-24 @ 05:15   -   10.7<L>/ 32.5<L>  06-13-24 @ 05:00   -   10.9<L>/ 32.7<L>  06-12-24 @ 05:30   -   11.0<L>/ 33.3<L>  06-11-24 @ 05:00   -   12.0<L>/ 35.2<L>  06-10-24 @ 05:00   -   11.7<L>/ 33.8<L>  06-09-24 @ 05:15   -   11.3<L>/ 32.8<L>      Platelet Trend  06-14-24 @ 05:15   -  62<L>  06-13-24 @ 05:00   -  74<L>  06-12-24 @ 05:30   -  81<L>    Trend Sodium  06-14-24 @ 05:15   -  134<L>  06-13-24 @ 05:00   -  136  06-12-24 @ 05:30   -  136    Trend Potassium  06-14-24 @ 05:15   -  3.4<L>  06-13-24 @ 05:00   -  3.2<L>  06-12-24 @ 05:30   -  3.3<L>    Trend Bun/Cr  06-14-24 @ 05:15  BUN/CR -  45<H> / 2.04<H>  06-13-24 @ 05:00  BUN/CR -  53<H> / 2.08<H>  06-12-24 @ 05:30  BUN/CR -  66<H> / 2.30<H>    Lactic Acid Trend  06-06-24 @ 05:00   -   1.9  06-06-24 @ 01:00   -   2.7<H>    ABG Trend  06-06-24 @ 01:20   - 7.39/21<L>/117<H>/98.7<H>  05-28-24 @ 18:17   - 7.50<H>/23<L>/118<H>/98.3<H>  09-29-22 @ 04:33   - 7.46<H>/33<L>/77<L>/95.7  09-28-22 @ 20:02   - 7.51<H>/30<L>/103/98.7<H>  09-27-22 @ 04:20   - 7.49<H>/32<L>/136<H>/98.2<H>  09-25-22 @ 20:52   - 7.49<H>/31<L>/101/97.6  09-25-22 @ 04:45   - 7.45/35/85/96.3  09-24-22 @ 22:00   - 7.47<H>/33<L>/83/96.2  09-24-22 @ 16:40   - 7.51<H>/29<L>/83/95.8  09-24-22 @ 10:00   - 7.46<H>/30<L>/75<L>/94.1  09-24-22 @ 03:10   - 7.46<H>/29<L>/140<H>/97.9  09-23-22 @ 23:30   - 7.44/31<L>/97/98.0    Trend AST/ALT/ALK Phos/Bili  06-14-24 @ 05:15   115<H>/107<H>/450<H>/3.0<H>  06-13-24 @ 05:00   141<H>/121<H>/474<H>/2.8<H>  06-12-24 @ 05:30   166<H>/142<H>/486<H>/3.1<H>  06-11-24 @ 05:00   239<H>/174<H>/528<H>/3.6<H>  06-08-24 @ 05:40   304<H>/204<H>/445<H>/3.7<H>  06-07-24 @ 06:01   287<H>/192<H>/442<H>/4.1<H>  06-06-24 @ 08:32   177<H>/140<H>/415<H>/3.4<H>  06-06-24 @ 01:00   176<H>/134<H>/444<H>/3.4<H>  05-30-24 @ 07:50   117<H>/102<H>/422<H>/3.0<H>  05-29-24 @ 06:54   110<H>/88<H>/365<H>/3.1<H>  05-28-24 @ 06:47   115<H>/73<H>/372<H>/2.9<H>  05-27-24 @ 06:52   90<H>/63<H>/316<H>/3.0<H>      Ammonia Trend  06-06-24 @ 16:50   -   26      Amylase / Lipase Trend  05-23-24 @ 10:42   -   -- / 40  04-19-24 @ 14:45   -   -- / 39      Albumin Trend  06-14-24 @ 05:15   -   2.6<L>  06-13-24 @ 05:00   -   2.6<L>  06-12-24 @ 05:30   -   2.6<L>  06-11-24 @ 05:00   -   2.8<L>  06-08-24 @ 05:40   -   2.7<L>  06-07-24 @ 06:01   -   2.8<L>      PTT - PT - INR Trend  05-30-24 @ 07:50   -   31.7 - -- - --  05-29-24 @ 06:54   -   -- - 15.8<H> - 1.36<H>  04-19-24 @ 14:45   -   34.7 - 12.2 - 1.04  12-22-23 @ 16:35   -   29.7 - 12.7 - 1.12  01-09-23 @ 13:32   -   29.4 - 12.5 - 1.08  01-03-23 @ 17:32   -   29.5 - 15.6<H> - 1.34<H>    Glucose Trend  06-14-24 @ 05:15   -  89 -- --  06-13-24 @ 05:00   -  95 -- --  06-12-24 @ 05:30   -  106<H> -- --        LABS:                        10.7   5.49  )-----------( 62       ( 14 Jun 2024 05:15 )             32.5     06-14    134<L>  |  98  |  45<H>  ----------------------------<  89  3.4<L>   |  26  |  2.04<H>    Ca    8.4      14 Jun 2024 05:15  Phos  2.2     06-14  Mg     1.9     06-14    TPro  6.6  /  Alb  2.6<L>  /  TBili  3.0<H>  /  DBili  x   /  AST  115<H>  /  ALT  107<H>  /  AlkPhos  450<H>  06-14    ADRIA Negative 05-24 @ 13:55  Anti SS-1 --  Anti SS-2 --  Anti RNP --  RF -- 05-24 @ 13:55    Atypical ANCA -- 05-24 @ 13:55  c-ANCA titer -- 05-24 @ 13:55  c-ANCA -- 05-24 @ 13:55  p-ANCA -- 05-24 @ 13:55            Urinalysis Basic - ( 14 Jun 2024 05:15 )    Color: x / Appearance: x / SG: x / pH: x  Gluc: 89 mg/dL / Ketone: x  / Bili: x / Urobili: x   Blood: x / Protein: x / Nitrite: x   Leuk Esterase: x / RBC: x / WBC x   Sq Epi: x / Non Sq Epi: x / Bacteria: x        RADIOLOGY  CXR:      CT:    ECHO:      VITALS:  T(C): 36.6 (06-14-24 @ 05:00), Max: 36.8 (06-13-24 @ 16:00)  T(F): 97.8 (06-14-24 @ 05:00), Max: 98.2 (06-13-24 @ 16:00)  HR: 88 (06-14-24 @ 06:00) (80 - 92)  BP: 120/79 (06-14-24 @ 06:00) (88/57 - 120/79)  BP(mean): 94 (06-14-24 @ 06:00) (60 - 97)  RR: 18 (06-14-24 @ 06:00) (11 - 24)  SpO2: 98% (06-14-24 @ 06:00) (93% - 100%)      Ins and Outs     06-13-24 @ 07:01  -  06-14-24 @ 07:00  --------------------------------------------------------  IN: 216.6 mL / OUT: 850 mL / NET: -633.4 mL          Weight (kg): 86.2 (06-14-24 @ 10:00)        I&O's Detail    13 Jun 2024 07:01  -  14 Jun 2024 07:00  --------------------------------------------------------  IN:    DOBUTamine: 96.6 mL    Oral Fluid: 120 mL  Total IN: 216.6 mL    OUT:    Voided (mL): 850 mL  Total OUT: 850 mL    Total NET: -633.4 mL          Physical Examination:  GENERAL:               Alert, Oriented, No acute distress.    HEENT:                    Pupils equal, reactive to light.  Symmetric.   PULM:                     Bilateral air entry, Clear to auscultation bilaterally, no significant sputum production, No Rales, No Rhonchi, No Wheezing  CVS:                         S1, S2,  No Murmur  ABD:                        Soft, nondistended, nontender, normoactive bowel sounds,   EXT:                         No edema, nontender, No Cyanosis or Clubbing   Vascular:                Warm Extremities, Normal Capillary refill, Normal Distal Pulses  SKIN:                       Warm and well perfused, no rashes noted.   NEURO:                  Alert, oriented, interactive, nonfocal, follows commands  PSYC:                      Calm, + Insight.             Follow-up Critical Care Progress Note  Chief Complaint : Acute renal failure    Stable overnight, s/p PICC placement. No accute issues       Allergies :No Known Allergies      PAST MEDICAL & SURGICAL HISTORY:  BPH (benign prostatic hyperplasia)  Bipolar disorder  Depression  Anxiety  Umbilical hernia, incarcerated  Constipation  Urinary retention  CAD (coronary artery disease)  SCC (squamous cell carcinoma)  Lung mass  CHF, chronic  Heart failure with reduced ejection fraction  History of arthroscopy of knee Right, 1987  History of tonsillectomy 1952  History of hernia repair  H/O coronary angiogram      Medications:  MEDICATIONS  (STANDING):  aspirin enteric coated 81 milliGRAM(s) Oral daily  buMETAnide 1 milliGRAM(s) Oral two times a day  chlorhexidine 2% Cloths 1 Application(s) Topical <User Schedule>  clopidogrel Tablet 75 milliGRAM(s) Oral daily  DOBUTamine Infusion 3 MICROgram(s)/kG/Min (4.21 mL/Hr) IV Continuous <Continuous>  FLUoxetine 20 milliGRAM(s) Oral <User Schedule>  pantoprazole    Tablet 40 milliGRAM(s) Oral before breakfast  rosuvastatin 20 milliGRAM(s) Oral at bedtime  tamsulosin 0.8 milliGRAM(s) Oral at bedtime    MEDICATIONS  (PRN):  acetaminophen     Tablet .. 650 milliGRAM(s) Oral every 6 hours PRN Temp greater or equal to 38C (100.4F), Mild Pain (1 - 3)  ALPRAZolam 0.125 milliGRAM(s) Oral at bedtime PRN insomina  aluminum hydroxide/magnesium hydroxide/simethicone Suspension 30 milliLiter(s) Oral every 4 hours PRN Dyspepsia  melatonin 3 milliGRAM(s) Oral at bedtime PRN Insomnia  ondansetron Injectable 4 milliGRAM(s) IV Push every 8 hours PRN Nausea and/or Vomiting  oxymetazoline 0.05% Nasal Spray 2 Spray(s) Both Nostrils every 12 hours PRN epistatixis  zolpidem 5 milliGRAM(s) Oral at bedtime PRN Insomnia      Antibiotics History  vancomycin  IVPB 1000 milliGRAM(s) IV Intermittent once, 06-10-24 @ 10:22, Stop order after: 1 Doses  vancomycin  IVPB    , 06-11-24 @ 12:49  vancomycin  IVPB 1000 milliGRAM(s) IV Intermittent once, 06-11-24 @ 09:13, Stop order after: 1 Doses  vancomycin  IVPB 1000 milliGRAM(s) IV Intermittent every 24 hours, 06-12-24 @ 09:13, Stop order after: 3 Days      Heme Medications   aspirin enteric coated 81 milliGRAM(s) Oral daily, 06-06-24 @ 03:30  clopidogrel Tablet 75 milliGRAM(s) Oral daily, 06-06-24 @ 03:34      GI Medications  aluminum hydroxide/magnesium hydroxide/simethicone Suspension 30 milliLiter(s) Oral every 4 hours, 06-06-24 @ 02:58, Routine PRN  pantoprazole    Tablet 40 milliGRAM(s) Oral before breakfast, 06-07-24 @ 15:12, Routine      COVID  05-26-24 @ 14:58  COVID -   NotDetec  12-22-23 @ 21:20  COVID -   NotDetec  12-18-23 @ 15:55  COVID -   NotDetec  02-15-23 @ 15:50  COVID -   NotDetec  01-03-23 @ 17:32  COVID -   NotDetec  08-22-22 @ 18:50  COVID -   NotDetec  05-26-22 @ 06:36  COVID -   NotDetec  05-19-22 @ 18:21  COVID -   NotDetec  05-11-22 @ 11:40  COVID -   NotDetec      COVID Biomarkers    05-28-24 @ 19:40 ESR --  ---  CRP --  ---  DDimer  682<H>   ---   LDH --   ---   Ferritin --    05-28-24 @ 06:47 ESR --  ---  CRP --  ---  DDimer  --   ---   <H>   ---   Ferritin 93          Trend BNP  06-08-24 @ 05:40   -  6057<H>  06-06-24 @ 01:00   -  5254<H>  05-26-24 @ 14:25   -  5050<H>  05-22-24 @ 14:31   -  4697<H>  04-19-24 @ 18:15   -  6357<H>  04-10-24 @ 05:54   -  4956<H>      WBC Trend  06-14-24 @ 05:15   -  5.49  06-13-24 @ 05:00   -  5.28  06-12-24 @ 05:30   -  5.08    H/H Trend  06-14-24 @ 05:15   -   10.7<L>/ 32.5<L>  06-13-24 @ 05:00   -   10.9<L>/ 32.7<L>  06-12-24 @ 05:30   -   11.0<L>/ 33.3<L>  06-11-24 @ 05:00   -   12.0<L>/ 35.2<L>  06-10-24 @ 05:00   -   11.7<L>/ 33.8<L>  06-09-24 @ 05:15   -   11.3<L>/ 32.8<L>      Platelet Trend  06-14-24 @ 05:15   -  62<L>  06-13-24 @ 05:00   -  74<L>  06-12-24 @ 05:30   -  81<L>    Trend Sodium  06-14-24 @ 05:15   -  134<L>  06-13-24 @ 05:00   -  136  06-12-24 @ 05:30   -  136    Trend Potassium  06-14-24 @ 05:15   -  3.4<L>  06-13-24 @ 05:00   -  3.2<L>  06-12-24 @ 05:30   -  3.3<L>    Trend Bun/Cr  06-14-24 @ 05:15  BUN/CR -  45<H> / 2.04<H>  06-13-24 @ 05:00  BUN/CR -  53<H> / 2.08<H>  06-12-24 @ 05:30  BUN/CR -  66<H> / 2.30<H>    Lactic Acid Trend  06-06-24 @ 05:00   -   1.9  06-06-24 @ 01:00   -   2.7<H>    ABG Trend  06-06-24 @ 01:20   - 7.39/21<L>/117<H>/98.7<H>  05-28-24 @ 18:17   - 7.50<H>/23<L>/118<H>/98.3<H>  09-29-22 @ 04:33   - 7.46<H>/33<L>/77<L>/95.7  09-28-22 @ 20:02   - 7.51<H>/30<L>/103/98.7<H>  09-27-22 @ 04:20   - 7.49<H>/32<L>/136<H>/98.2<H>  09-25-22 @ 20:52   - 7.49<H>/31<L>/101/97.6  09-25-22 @ 04:45   - 7.45/35/85/96.3  09-24-22 @ 22:00   - 7.47<H>/33<L>/83/96.2  09-24-22 @ 16:40   - 7.51<H>/29<L>/83/95.8  09-24-22 @ 10:00   - 7.46<H>/30<L>/75<L>/94.1  09-24-22 @ 03:10   - 7.46<H>/29<L>/140<H>/97.9  09-23-22 @ 23:30   - 7.44/31<L>/97/98.0    Trend AST/ALT/ALK Phos/Bili  06-14-24 @ 05:15   115<H>/107<H>/450<H>/3.0<H>  06-13-24 @ 05:00   141<H>/121<H>/474<H>/2.8<H>  06-12-24 @ 05:30   166<H>/142<H>/486<H>/3.1<H>  06-11-24 @ 05:00   239<H>/174<H>/528<H>/3.6<H>  06-08-24 @ 05:40   304<H>/204<H>/445<H>/3.7<H>  06-07-24 @ 06:01   287<H>/192<H>/442<H>/4.1<H>  06-06-24 @ 08:32   177<H>/140<H>/415<H>/3.4<H>  06-06-24 @ 01:00   176<H>/134<H>/444<H>/3.4<H>  05-30-24 @ 07:50   117<H>/102<H>/422<H>/3.0<H>  05-29-24 @ 06:54   110<H>/88<H>/365<H>/3.1<H>  05-28-24 @ 06:47   115<H>/73<H>/372<H>/2.9<H>  05-27-24 @ 06:52   90<H>/63<H>/316<H>/3.0<H>      Ammonia Trend  06-06-24 @ 16:50   -   26      Amylase / Lipase Trend  05-23-24 @ 10:42   -   -- / 40  04-19-24 @ 14:45   -   -- / 39      Albumin Trend  06-14-24 @ 05:15   -   2.6<L>  06-13-24 @ 05:00   -   2.6<L>  06-12-24 @ 05:30   -   2.6<L>  06-11-24 @ 05:00   -   2.8<L>  06-08-24 @ 05:40   -   2.7<L>  06-07-24 @ 06:01   -   2.8<L>      PTT - PT - INR Trend  05-30-24 @ 07:50   -   31.7 - -- - --  05-29-24 @ 06:54   -   -- - 15.8<H> - 1.36<H>  04-19-24 @ 14:45   -   34.7 - 12.2 - 1.04  12-22-23 @ 16:35   -   29.7 - 12.7 - 1.12  01-09-23 @ 13:32   -   29.4 - 12.5 - 1.08  01-03-23 @ 17:32   -   29.5 - 15.6<H> - 1.34<H>    Glucose Trend  06-14-24 @ 05:15   -  89 -- --  06-13-24 @ 05:00   -  95 -- --  06-12-24 @ 05:30   -  106<H> -- --        LABS:                        10.7   5.49  )-----------( 62       ( 14 Jun 2024 05:15 )             32.5     06-14    134<L>  |  98  |  45<H>  ----------------------------<  89  3.4<L>   |  26  |  2.04<H>    Ca    8.4      14 Jun 2024 05:15  Phos  2.2     06-14  Mg     1.9     06-14    TPro  6.6  /  Alb  2.6<L>  /  TBili  3.0<H>  /  DBili  x   /  AST  115<H>  /  ALT  107<H>  /  AlkPhos  450<H>  06-14    ADRIA Negative 05-24 @ 13:55  Anti SS-1 --  Anti SS-2 --  Anti RNP --  RF -- 05-24 @ 13:55    Atypical ANCA -- 05-24 @ 13:55  c-ANCA titer -- 05-24 @ 13:55  c-ANCA -- 05-24 @ 13:55  p-ANCA -- 05-24 @ 13:55          < from: Xray Chest 1 View- PORTABLE-Urgent (Xray Chest 1 View- PORTABLE-Urgent .) (06.13.24 @ 14:51) >  ACC: [id number] EXAM:  XR CHEST PORTABLE ROUTINE 1V   ORDERED BY: MIGUE LINDA     ACC: [id number] EXAM:  XR CHEST PORTABLE URGENT 1V   ORDERED BY: SAKSHI KAHN     PROCEDURE DATE:  06/13/2024          INTERPRETATION:  AP chest on June 11, 2024 at 6:36 AM. Patient is short   of breath and has renal failure.    Heart is enlarged.    There is an irregular density increased in size compared to June 8.    There is a persistent infiltrate in the right lower lung field although   there is someimprovement.    Follow-up AP chest on June 13, 2024 at 2:27 PM.    A left PICC line is been inserted. Tip is in the mid lower superior vena   cava. Other findings stable.    IMPRESSION: Persistent irregular density left upper lobe. Malignancy not   excluded. Stable right base findings.    Left PICC line inserted.    --- End of Report ---    < end of copied text >      VITALS:  T(C): 36.6 (06-14-24 @ 05:00), Max: 36.8 (06-13-24 @ 16:00)  T(F): 97.8 (06-14-24 @ 05:00), Max: 98.2 (06-13-24 @ 16:00)  HR: 88 (06-14-24 @ 06:00) (80 - 92)  BP: 120/79 (06-14-24 @ 06:00) (88/57 - 120/79)  BP(mean): 94 (06-14-24 @ 06:00) (60 - 97)  RR: 18 (06-14-24 @ 06:00) (11 - 24)  SpO2: 98% (06-14-24 @ 06:00) (93% - 100%)      Ins and Outs     06-13-24 @ 07:01  -  06-14-24 @ 07:00  --------------------------------------------------------  IN: 216.6 mL / OUT: 850 mL / NET: -633.4 mL          Weight (kg): 86.2 (06-14-24 @ 10:00)        I&O's Detail    13 Jun 2024 07:01  -  14 Jun 2024 07:00  --------------------------------------------------------  IN:    DOBUTamine: 96.6 mL    Oral Fluid: 120 mL  Total IN: 216.6 mL    OUT:    Voided (mL): 850 mL  Total OUT: 850 mL    Total NET: -633.4 mL          Physical Examination:  GENERAL:               Alert, Oriented, No acute distress.    PULM:                     Bilateral air entry, Clear to auscultation bilaterally, no significant sputum production, No Rales, No Rhonchi, No Wheezing  CVS:                         S1, S2,  No Murmur  ABD:                        Soft, nondistended, nontender, normoactive bowel sounds,   EXT:                         No edema, nontender, No Cyanosis or Clubbing   Vascular:                Warm Extremities, Normal Capillary refill, Normal Distal Pulses  SKIN:                       Warm and well perfused, no rashes noted.   NEURO:                  Alert, oriented, interactive, nonfocal, follows commands  PSYC:                      Calm, + Insight.

## 2024-06-14 NOTE — PROGRESS NOTE ADULT - REASON FOR ADMISSION
hypotension, elevated trop
chf
hypotension, elevated trop

## 2024-06-14 NOTE — DISCHARGE NOTE PROVIDER - PROVIDER TOKENS
PROVIDER:[TOKEN:[[id number]],FOLLOWUP:[2 weeks]],PROVIDER:[TOKEN:[[id number]],FOLLOWUP:[1 month]],PROVIDER:[TOKEN:[[id number]],FOLLOWUP:[2 weeks]],PROVIDER:[TOKEN:[[id number]],FOLLOWUP:[2 weeks]],PROVIDER:[TOKEN:[[id number]],FOLLOWUP:[2 weeks]]

## 2024-06-14 NOTE — PROGRESS NOTE ADULT - SUBJECTIVE AND OBJECTIVE BOX
Seen earlier in ICU, comfortable on RA    Vital Signs Last 24 Hrs  T(C): 36.6 (24 @ 16:00), Max: 36.6 (24 @ 05:00)  T(F): 97.8 (24 @ 16:00), Max: 97.8 (24 @ 05:00)  HR: 86 (24 @ 16:00) (82 - 93)  BP: 100/82 (24 @ 16:00) (88/57 - 120/79)  BP(mean): 89 (24 @ 16:00) (67 - 97)  RR: 18 (24 @ 16:00) (11 - 22)  SpO2: 96% (24 @ 16:00) (93% - 100%)    I&O's Detail    2024 07:01  -  2024 07:00  --------------------------------------------------------  IN:    DOBUTamine: 100.8 mL    Oral Fluid: 120 mL  Total IN: 220.8 mL    OUT:    Voided (mL): 850 mL  Total OUT: 850 mL    2024 07:01  -  2024 19:31  --------------------------------------------------------  IN:    DOBUTamine: 37.8 mL  Total IN: 37.8 mL    s1s2  b/l air entry  soft, NT  sm edema                                                      10.7   5.49  )-----------( 62       ( 2024 05:15 )             32.5     2024 05:15    134    |  98     |  45     ----------------------------<  89     3.4     |  26     |  2.04     Ca    8.4        2024 05:15  Phos  2.2       2024 05:15  Mg     1.9       2024 05:15    TPro  6.6    /  Alb  2.6    /  TBili  3.0    /  DBili  x      /  AST  115    /  ALT  107    /  AlkPhos  450    2024 05:15    LIVER FUNCTIONS - ( 2024 05:15 )  Alb: 2.6 g/dL / Pro: 6.6 g/dL / ALK PHOS: 450 U/L / ALT: 107 U/L / AST: 115 U/L / GGT: x           acetaminophen     Tablet .. 650 milliGRAM(s) Oral every 6 hours PRN  ALPRAZolam 0.125 milliGRAM(s) Oral at bedtime PRN  aluminum hydroxide/magnesium hydroxide/simethicone Suspension 30 milliLiter(s) Oral every 4 hours PRN  aspirin enteric coated 81 milliGRAM(s) Oral daily  buMETAnide 1 milliGRAM(s) Oral two times a day  chlorhexidine 2% Cloths 1 Application(s) Topical <User Schedule>  clopidogrel Tablet 75 milliGRAM(s) Oral daily  DOBUTamine Infusion 3 MICROgram(s)/kG/Min IV Continuous <Continuous>  FLUoxetine 20 milliGRAM(s) Oral <User Schedule>  melatonin 3 milliGRAM(s) Oral at bedtime PRN  ondansetron Injectable 4 milliGRAM(s) IV Push every 8 hours PRN  oxymetazoline 0.05% Nasal Spray 2 Spray(s) Both Nostrils every 12 hours PRN  pantoprazole    Tablet 40 milliGRAM(s) Oral before breakfast  rosuvastatin 20 milliGRAM(s) Oral at bedtime  tamsulosin 0.8 milliGRAM(s) Oral at bedtime  zolpidem 5 milliGRAM(s) Oral at bedtime PRN    A/P:    CM, EF < 20%, lung ca, mod MR, mod-severe TR  Cardio-renal BRODY/CKD 3   CT A/P w/o hydro - 24  UA negative - 24  Resp distress iso CM, lung ca  V/Q scan w/low prob for PE - 24  Tx to ICU, improved w/ qtt  For d/c on  and Bumex  Cr has improved and is presently near baseline  Avoid nephrotoxins, no NSAID's  Suppl K  Renal f/u as op    [phone]

## 2024-06-14 NOTE — PROGRESS NOTE ADULT - SUBJECTIVE AND OBJECTIVE BOX
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  Patient was in the sitting position when he fell forward and hit his chin and left arm, with noticeable bruising on physical examination.  Of note, CT chest in early April 2024 showed left upper lobe nodule within the radiation field of more rounded contour, gradually becoming more discrete compared to prior studies.  Adjustment in cardiac medication done by cardiology (Dr. Walden reduced Coreg by 50%).  Oncology consulted in light of evidence of POD on CT chest.    MEDICATIONS:  acetaminophen     Tablet .. 650 milliGRAM(s) Oral every 6 hours PRN  aluminum hydroxide/magnesium hydroxide/simethicone Suspension 30 milliLiter(s) Oral every 4 hours PRN  aspirin enteric coated 81 milliGRAM(s) Oral daily  buMETAnide Injectable 1 milliGRAM(s) IV Push two times a day  chlorhexidine 2% Cloths 1 Application(s) Topical <User Schedule>  clopidogrel Tablet 75 milliGRAM(s) Oral daily  FLUoxetine 20 milliGRAM(s) Oral <User Schedule>  heparin   Injectable 5000 Unit(s) SubCutaneous every 12 hours  melatonin 3 milliGRAM(s) Oral at bedtime PRN  ondansetron Injectable 4 milliGRAM(s) IV Push every 8 hours PRN  pantoprazole    Tablet 40 milliGRAM(s) Oral before breakfast  rosuvastatin 20 milliGRAM(s) Oral at bedtime  sodium bicarbonate 650 milliGRAM(s) Oral three times a day  tamsulosin 0.8 milliGRAM(s) Oral at bedtime  traZODone 25 milliGRAM(s) Oral at bedtime PRN  vancomycin  IVPB 1000 milliGRAM(s) IV Intermittent once  All other medications reviewed.    SUBJECTIVE:  Remains on dobutamine drip in CCU; status unchanged.    VITALS:  T(C): 37 (06-10-24 @ 05:00), Max: 37 (06-10-24 @ 05:00)  T(F): 98.6 (06-10-24 @ 05:00), Max: 98.6 (06-10-24 @ 05:00)  HR: 68 (06-10-24 @ 06:00) (64 - 76)  BP: 100/64 (06-10-24 @ 06:00) (81/63 - 111/68)      PHYSICAL EXAM:  Constitutional: alert, well developed  HEENT: normocephalic, anicteric sclerae, no mucositis or thrush  Respiratory: bilateral clear to auscultation anteriorly  Cardiovascular : S1, S2 regular, rhythmic, no murmurs, gallops or rubs  Abdomen: soft, distended, + normoactive BS, no palpable HS- megaly  Extremities: no tenderness;  -c/c/e, pulses equal bilaterally    LABS:  (06-10) WBC: 4.73 K/uL,Hemoglobin: 11.7 g/dL, Hematocrit: 33.8 %,  Platelet: 95 K/uL  (06-10) Na: 132 mmol/L ; K: 3.5 mmol/L ; BUN: 76 mg/dL ; Cr: 2.48 mg/dL.    RADIOLOGY:  ACC: [id number] EXAM:  CT BRAIN   ORDERED BY: CATHI MONTES   PROCEDURE DATE:  06/06/2024    INTERPRETATION:  NONCONTRAST CT OF THE BRAIN DATED 6/6/2024.  CLINICAL INFORMATION:  Head injury.  TECHNIQUE: Axial CT images are obtained from the cranial vertex to the   skull base without the administration of IV contrast. Images are   reformatted in sagittal and coronal planes.  The study is correlated with an MRI of the brain from 2/1/2023 and PET/CT   from 5/5/2024..  FINDINGS:  There is no acute intra-axial or extra-axial hemorrhage. There are dense   calcifications in the left basal ganglia which are seen on prior PET CTs.   There is no mass effect or shift of the midline. The basal cisterns are   not effaced. There is cerebral and cerebellar volume loss with prominence   of the ventricles, sulci, and cerebellar folia. There are mild chronic   ischemic changes in the frontoparietal white matter. There are   atherosclerotic calcifications of the intracranial carotid arteries.    There is no significant scalp soft tissue swelling or scalp hematoma. The   skull base and bony calvarium are intact. The visualized paranasal   sinuses and tympanic/mastoid cavities are clear apart from minimal   bilateral ethmoid mucosal thickening and a left middle ear and mastoid   effusion.    IMPRESSION:  No acute intracranial hemorrhage, mass effect, or acute osseous fracture.  Dense calcifications in the left basal ganglia unchanged compared to prior PET CTs.  Left middleear and mastoid effusion. Correlate with clinical exam.     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  Patient was in the sitting position when he fell forward and hit his chin and left arm, with noticeable bruising on physical examination.  Of note, CT chest in early April 2024 showed left upper lobe nodule within the radiation field of more rounded contour, gradually becoming more discrete compared to prior studies.  Adjustment in cardiac medication done by cardiology (Dr. Walden reduced Coreg by 50%).  Oncology consulted in light of evidence of POD on CT chest.    MEDICATIONS:  acetaminophen     Tablet .. 650 milliGRAM(s) Oral every 6 hours PRN  aluminum hydroxide/magnesium hydroxide/simethicone Suspension 30 milliLiter(s) Oral every 4 hours PRN  aspirin enteric coated 81 milliGRAM(s) Oral daily  buMETAnide Injectable 1 milliGRAM(s) IV Push two times a day  chlorhexidine 2% Cloths 1 Application(s) Topical <User Schedule>  clopidogrel Tablet 75 milliGRAM(s) Oral daily  FLUoxetine 20 milliGRAM(s) Oral <User Schedule>  heparin   Injectable 5000 Unit(s) SubCutaneous every 12 hours  melatonin 3 milliGRAM(s) Oral at bedtime PRN  ondansetron Injectable 4 milliGRAM(s) IV Push every 8 hours PRN  pantoprazole    Tablet 40 milliGRAM(s) Oral before breakfast  rosuvastatin 20 milliGRAM(s) Oral at bedtime  sodium bicarbonate 650 milliGRAM(s) Oral three times a day  tamsulosin 0.8 milliGRAM(s) Oral at bedtime  traZODone 25 milliGRAM(s) Oral at bedtime PRN  vancomycin  IVPB 1000 milliGRAM(s) IV Intermittent once  All other medications reviewed.    SUBJECTIVE:  Ambulating with a walker.  Appears nontoxic.    VITALS:  T(C): 37 (06-10-24 @ 05:00), Max: 37 (06-10-24 @ 05:00)  T(F): 98.6 (06-10-24 @ 05:00), Max: 98.6 (06-10-24 @ 05:00)  HR: 68 (06-10-24 @ 06:00) (64 - 76)  BP: 100/64 (06-10-24 @ 06:00) (81/63 - 111/68)      PHYSICAL EXAM:  Constitutional: alert, well developed  HEENT: normocephalic, anicteric sclerae, no mucositis or thrush  Respiratory: bilateral clear to auscultation anteriorly  Cardiovascular : S1, S2 regular, rhythmic, no murmurs, gallops or rubs  Abdomen: soft, distended, + normoactive BS, no palpable HS- megaly  Extremities: no tenderness;  -c/c/e, pulses equal bilaterally    LABS:  (06-10) WBC: 4.73 K/uL,Hemoglobin: 11.7 g/dL, Hematocrit: 33.8 %,  Platelet: 95 K/uL  (06-10) Na: 132 mmol/L ; K: 3.5 mmol/L ; BUN: 76 mg/dL ; Cr: 2.48 mg/dL.    RADIOLOGY:  ACC: [id number] EXAM:  CT BRAIN   ORDERED BY: CATHI MONTES   PROCEDURE DATE:  06/06/2024    INTERPRETATION:  NONCONTRAST CT OF THE BRAIN DATED 6/6/2024.  CLINICAL INFORMATION:  Head injury.  TECHNIQUE: Axial CT images are obtained from the cranial vertex to the   skull base without the administration of IV contrast. Images are   reformatted in sagittal and coronal planes.  The study is correlated with an MRI of the brain from 2/1/2023 and PET/CT   from 5/5/2024..  FINDINGS:  There is no acute intra-axial or extra-axial hemorrhage. There are dense   calcifications in the left basal ganglia which are seen on prior PET CTs.   There is no mass effect or shift of the midline. The basal cisterns are   not effaced. There is cerebral and cerebellar volume loss with prominence   of the ventricles, sulci, and cerebellar folia. There are mild chronic   ischemic changes in the frontoparietal white matter. There are   atherosclerotic calcifications of the intracranial carotid arteries.    There is no significant scalp soft tissue swelling or scalp hematoma. The   skull base and bony calvarium are intact. The visualized paranasal   sinuses and tympanic/mastoid cavities are clear apart from minimal   bilateral ethmoid mucosal thickening and a left middle ear and mastoid   effusion.    IMPRESSION:  No acute intracranial hemorrhage, mass effect, or acute osseous fracture.  Dense calcifications in the left basal ganglia unchanged compared to prior PET CTs.  Left middleear and mastoid effusion. Correlate with clinical exam.

## 2024-06-14 NOTE — DISCHARGE NOTE PROVIDER - NSDCMRMEDTOKEN_GEN_ALL_CORE_FT
aspirin 81 mg oral delayed release tablet: 1 tab(s) orally once a day  bumetanide 1 mg oral tablet: 1 tab(s) orally once a day  carvedilol 3.125 mg oral tablet: 1 tab(s) orally every 12 hours  clopidogrel 75 mg oral tablet: 1 tab(s) orally once a day  DOBUTamine: 4.2 milliliter(s) by continuous intravenous infusion once a day 1000mg in 250cc, infuse at 4.2ml/hr continuously daily, no stop time  FLUoxetine (Eqv-PROzac) 20 mg oral tablet: 1 tab(s) orally every 3 days  latanoprost 0.005% ophthalmic solution: 1 drop(s) in each eye once a day  mirtazapine 15 mg oral tablet: 1 tab(s) orally once a day (at bedtime)  pantoprazole 40 mg oral delayed release tablet: 1 tab(s) orally once a day (before a meal)  rosuvastatin 20 mg oral tablet: 1 tab(s) orally once a day (at bedtime)  tamsulosin 0.4 mg oral capsule: 2 cap(s) orally once a day (at bedtime)  temazepam 15 mg oral capsule: 1 cap(s) orally once a day (at bedtime) as needed for  insomnia  zolpidem 5 mg oral tablet: 1 tab(s) orally once a day (at bedtime) As needed Insomnia   aspirin 81 mg oral delayed release tablet: 1 tab(s) orally once a day  bumetanide 1 mg oral tablet: 1 tab(s) orally once a day  clopidogrel 75 mg oral tablet: 1 tab(s) orally once a day  DOBUTamine: 4.2 milliliter(s) by continuous intravenous infusion once a day 1000mg in 250cc, infuse at 4.2ml/hr continuously daily, no stop time  FLUoxetine (Eqv-PROzac) 20 mg oral tablet: 1 tab(s) orally every 3 days  latanoprost 0.005% ophthalmic solution: 1 drop(s) in each eye once a day  mirtazapine 15 mg oral tablet: 1 tab(s) orally once a day (at bedtime)  pantoprazole 40 mg oral delayed release tablet: 1 tab(s) orally once a day (before a meal)  rosuvastatin 20 mg oral tablet: 1 tab(s) orally once a day (at bedtime)  tamsulosin 0.4 mg oral capsule: 2 cap(s) orally once a day (at bedtime)  temazepam 15 mg oral capsule: 1 cap(s) orally once a day (at bedtime) as needed for  insomnia  zolpidem 5 mg oral tablet: 1 tab(s) orally once a day (at bedtime) As needed Insomnia

## 2024-06-14 NOTE — DISCHARGE NOTE NURSING/CASE MANAGEMENT/SOCIAL WORK - NSDCVIVACCINE_GEN_ALL_CORE_FT
COVID-19, mRNA, LNP-S, PF, 100 mcg/ 0.5 mL dose (Moderna); 17-May-2022 14:07; Pankaj Velazco (RN); [employer] [id number] (Exp. Date: 05-Jun-2022); IntraMuscular; Deltoid Left.; 0.25 milliLiter(s);

## 2024-06-14 NOTE — DISCHARGE NOTE PROVIDER - CARE PROVIDER_API CALL
Que Alejandre  Radiation Oncology  [address] [place] [address]  Phone: [phone]  Fax: [phone]  Follow Up Time: 2 weeks    Nicholas Espinal  Critical Care Medicine  [address] [facility], [address]  [place] [address]  Phone: [phone]  Fax: [phone]  Follow Up Time: 1 month    Niles Walden  Cardiology  [address] [facility], [address]  [place] [address]  Phone: [phone]  Fax: [phone]  Follow Up Time: 2 weeks    Mayelin Varela  Medical Oncology  [address] [place] [address]  Phone: [phone]  Fax: [phone]  Follow Up Time: 2 weeks    Que Shell  Interventional Cardiology  [address] [place] [address]  Phone: [phone]  Fax: [phone]  Follow Up Time: 2 weeks

## 2024-06-15 ENCOUNTER — NON-APPOINTMENT (OUTPATIENT)
Age: 79
End: 2024-06-15

## 2024-06-17 RX ORDER — CARVEDILOL 3.12 MG/1
3.12 TABLET, FILM COATED ORAL
Refills: 0 | Status: DISCONTINUED | COMMUNITY
End: 2024-06-17

## 2024-06-17 RX ORDER — DOBUTAMINE IN DEXTROSE 400 MG/100ML
INJECTION, SOLUTION INTRAVENOUS
Qty: 250 | Refills: 0 | Status: ACTIVE | COMMUNITY

## 2024-06-17 RX ORDER — ZOLPIDEM TARTRATE 5 MG/1
5 TABLET ORAL
Refills: 0 | Status: ACTIVE | COMMUNITY

## 2024-06-17 RX ORDER — TAMSULOSIN HYDROCHLORIDE 0.4 MG/1
0.4 CAPSULE ORAL AT BEDTIME
Refills: 0 | Status: ACTIVE | COMMUNITY

## 2024-06-17 RX ORDER — PANTOPRAZOLE 40 MG/1
40 TABLET, DELAYED RELEASE ORAL DAILY
Refills: 0 | Status: ACTIVE | COMMUNITY

## 2024-06-18 ENCOUNTER — APPOINTMENT (OUTPATIENT)
Dept: FAMILY MEDICINE | Facility: CLINIC | Age: 79
End: 2024-06-18
Payer: MEDICARE

## 2024-06-18 VITALS — BODY MASS INDEX: 25.77 KG/M2 | WEIGHT: 190 LBS

## 2024-06-18 VITALS
HEIGHT: 72 IN | SYSTOLIC BLOOD PRESSURE: 100 MMHG | DIASTOLIC BLOOD PRESSURE: 72 MMHG | TEMPERATURE: 97.7 F | HEART RATE: 94 BPM | OXYGEN SATURATION: 99 % | RESPIRATION RATE: 18 BRPM

## 2024-06-18 DIAGNOSIS — N17.9 ACUTE KIDNEY FAILURE, UNSPECIFIED: ICD-10-CM

## 2024-06-18 PROCEDURE — 99496 TRANSJ CARE MGMT HIGH F2F 7D: CPT

## 2024-06-18 RX ORDER — DAPAGLIFLOZIN 5 MG/1
5 TABLET, FILM COATED ORAL
Qty: 30 | Refills: 5 | Status: DISCONTINUED | COMMUNITY
Start: 2022-10-27 | End: 2024-06-18

## 2024-06-18 NOTE — PHYSICAL EXAM
[No Acute Distress] : no acute distress [Well Nourished] : well nourished [Well Developed] : well developed [Well-Appearing] : well-appearing [Normal Sclera/Conjunctiva] : normal sclera/conjunctiva [PERRL] : pupils equal round and reactive to light [EOMI] : extraocular movements intact [Normal Outer Ear/Nose] : the outer ears and nose were normal in appearance [Normal Oropharynx] : the oropharynx was normal [No JVD] : no jugular venous distention [No Lymphadenopathy] : no lymphadenopathy [Supple] : supple [Thyroid Normal, No Nodules] : the thyroid was normal and there were no nodules present [No Respiratory Distress] : no respiratory distress  [No Accessory Muscle Use] : no accessory muscle use [Clear to Auscultation] : lungs were clear to auscultation bilaterally [Normal Rate] : normal rate  [Regular Rhythm] : with a regular rhythm [Normal S1, S2] : normal S1 and S2 [No Murmur] : no murmur heard [No Carotid Bruits] : no carotid bruits [No Abdominal Bruit] : a ~M bruit was not heard ~T in the abdomen [No Varicosities] : no varicosities [Pedal Pulses Present] : the pedal pulses are present [No Edema] : there was no peripheral edema [No Palpable Aorta] : no palpable aorta [No Extremity Clubbing/Cyanosis] : no extremity clubbing/cyanosis [Soft] : abdomen soft [Non Tender] : non-tender [Non-distended] : non-distended [No Masses] : no abdominal mass palpated [No HSM] : no HSM [Normal Bowel Sounds] : normal bowel sounds [Normal Posterior Cervical Nodes] : no posterior cervical lymphadenopathy [Normal Anterior Cervical Nodes] : no anterior cervical lymphadenopathy [No CVA Tenderness] : no CVA  tenderness [No Spinal Tenderness] : no spinal tenderness [No Joint Swelling] : no joint swelling [Grossly Normal Strength/Tone] : grossly normal strength/tone [No Rash] : no rash [Coordination Grossly Intact] : coordination grossly intact [No Focal Deficits] : no focal deficits [Normal Gait] : normal gait [Deep Tendon Reflexes (DTR)] : deep tendon reflexes were 2+ and symmetric [Normal Affect] : the affect was normal [Normal Insight/Judgement] : insight and judgment were intact [de-identified] : +1 edema to bilateral lower extremities, noted significant cardiac murmur [de-identified] : Dusky appearance of bilateral feet [de-identified] : Ecchymosis noted in bilateral upper extremities [27379 - High Complexity requires an extensive number of possible diagnoses and/or the management options, extensive complexity of the medical data (tests, etc.) to be reviewed, and a high risk of significant complications, morbidity, and/or mortality as w] : High Complexity

## 2024-06-18 NOTE — ASSESSMENT
[FreeTextEntry1] : Heart failure with reduced ejection fraction, and diastolic dysfunction overall has been having slightly worsening OGLESBY.  Will check CMP and BMP.  Due to patient is already on dobutamine drip discussed with patient that currently we would defer to cardiology regarding increasing diuretics.  Would fear bilateral malignant outpatient, or worsening BRODY.  If OGLESBY worsens he should report to ED  Encourage follow-up with oncology and pulmonology.  Patient has declined hospice-he is DNR but wife very adamant that she wants palliative care but also treatment for persistent heart failure.  Patient was not a candidate for LVAD due to lung cancer  Encourage follow-up with PCP

## 2024-06-18 NOTE — HISTORY OF PRESENT ILLNESS
[FreeTextEntry4] : odules and dobutamine initiated. He refused hospice. He was aggressively diuresed and followed by nephrology. His course was complicated by a superficial phlebitis for which he was given 4 days of vancomycin. PICC line placed and cleared for discharge with home dobutamine infusion.  For full account of hospital course please refer to actual medical records. As this is a brief summery.  Med Reconciliation: Medication Reconciliation Status Admission Reconciliation is Completed Discharge Reconciliation is Completed Discharge Medications aspirin 81 mg oral delayed release tablet: 1 tab(s) orally once a day bumetanide 1 mg oral tablet: 1 tab(s) orally once a day clopidogrel 75 mg oral tablet: 1 tab(s) orally once a day DOBUTamine: 4.2 milliliter(s) by continuous intravenous infusion once a day 1000mg in 250cc, infuse at 4.2ml/hr continuously daily, no stop time FLUoxetine (Eqv-PROzac) 20 mg oral tablet: 1 tab(s) orally every 3 days latanoprost 0.005% ophthalmic solution: 1 drop(s) in each eye once a day mirtazapine 15 mg oral tablet: 1 tab(s) orally once a day (at bedtime) pantoprazole 40 mg oral delayed release tablet: 1 tab(s) orally once a day (before a meal) rosuvastatin 20 mg oral tablet: 1 tab(s) orally once a day (at bedtime) tamsulosin 0.4 mg oral capsule: 2 cap(s) orally once a day (at bedtime) temazepam 15 mg oral capsule: 1 cap(s) orally once a day (at bedtime) as needed for insomnia zolpidem 5 mg oral tablet: 1 tab(s) orally once a day (at bedtime) As needed Insomnia , , Care Plan/Procedures: Discharge Diagnoses, Assessment and Plan of Treatment PRINCIPAL DISCHARGE DIAGNOSIS Diagnosis: Acute on chronic renal failure Assessment and Plan of Treatment: Goal(s) To get better and follow your care plan as instructed. Follow Up: Care Providers for Follow up (PCP/Outpatient Provider) [facility] [address] [place] [address] Phone: [phone] Fax: [phone] Follow Up Time: 2 weeks  Rahmanou, [facility] [address] [facility], [address] [place] [address] Phone: [phone] Fax: [phone] Follow Up Time: 1 month  [facility] [address] [facility], [address] [place] [address] Phone: [phone] Fax: [phone] Follow Up Time: 2 weeks  [facility] [address] [place] [address] Phone: [phone] Fax: [phone] Follow Up Time: 2 weeks  [facility] [address] [place] [address] Phone: [phone] Fax: [phone] Follow Up Time: 2 weeks Additional Provider Info (For SysAdmin Use Only) PROVIDER:[TOKEN:[[id number]],FOLLOWUP:[2 weeks]],PROVIDER:[TOKEN:[[id number]],FOLLOWUP:[1 month]],PROVIDER:[TOKEN:[[id number]],FOLLOWUP:[2 weeks]],PROVIDER:[TOKEN:[[id number]],FOLLOWUP:[2 weeks]],PROVIDER:[TOKEN:[[id number]],FOLLOWUP:[2 weeks]] Care Providers Direct Addresses (For SYSAdmin Use Only) ,[email or web address] NPI number (For SysAdmin Use Only) : [[id number]],[[id number]] Patient's Scheduled Appointments Usama Min [facility] Physician [facility] FAMILYMED [address] Scheduled Appointment: 06/18/2024  [facility] Physician [facility] HEARTFAIL [address] Scheduled Appointment: 06/21/2024  Que Alej[facility] Physician [facility] RADMED [address] Scheduled Appointment: 06/27/2024  N[facility] Walden [facility] Physician [facility] CARDIOLOGY [address] Scheduled Appointment: 07/02/2024 Discharge Diet DASH Diet Activity No restrictions Quality Measures: Does the patient have difficulty running errands alone like visiting a doctors office or shopping? No Does the patient have difficulty climbing stairs? Yes Cognition: The patient has No difficulties Patient Condition Stable Hospice Patient No Core Measure Site Yes Does the patient have a principal diagnosis of ischemic stroke, hemorrhagic stroke, or TIA? No Does the patient have a principal diagnosis of Acute Myocardial Infarction? No Has the patient had a Percutaneous Coronary Intervention? No Tobacco Usage Within the Last Year No Home Health: Discharged with Home Health Care Services? Yes Face-To-Face Contact As certified below, I, or a nurse practitioner or physician assistant working with me, had a face-to-face encounter that meets the physician face-to-face encounter requirements. Need for Skilled Services Central venous access care Based on the above findings, the following intermittent skilled services are medically necessary home health services: Nursing Home Bound Status Fall risk Patient Needs Assistance to Leave Residence... Requires an assistive device to leave home: Walker Assistive device required to leave home due to: end stage heart failure Requires special transportation: Ashley Attending Certification My signature below certifies that the above stated patient is homebound and upon completion of the Face-To-Face encounter, has the need for intermittent skilled nursing, physical therapy and/or speech or occupational therapy services in their home for their current diagnosis as outlined in their initial plan of care. These services will continue to be monitored by myself or another physician. Encounter Date 14-Jun-2024 Document Complete: Care Provider Seen in [facility] Physician Section Complete This document is complete and the patient is ready for discharge. For questions about your prescriptions, please call: [phone] Is this contact telephone number correct? Yes Attending Attestation Statement I have personally seen and examined the patient. I have collaborated with and supervised the . on the discharge service for the patient. I have reviewed and made amendments to the documentation where necessary. 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 Pt was BIBEMS to  ED 6/6 due to lethargy and hypotension. Was admitted to ICU for acute decompensated heart failure in the setting of EF less than 20% with valvular disease and right heart failure, cardiogenic shock with worsening CKD, transaminitis and lactic acidosis. He was seen by cardiology and was deemed not a candidate for RVAD / LVAD based on history of lung cancer with new lung nodules and dobutamine initiated. He refused hospice. He was aggressively diuresed and followed by nephrology. His course was complicated by a superficial phlebitis for which he was given 4 days of vancomycin. PICC line placed and cleared for discharge with home dobutamine infusion.  For full account of hospital course please refer to actual medical records. As this is a brief summery.  Med Reconciliation: Medication Reconciliation Status Admission Reconciliation is Completed Discharge Reconciliation is Completed Discharge Medications aspirin 81 mg oral delayed release tablet: 1 tab(s) orally once a day bumetanide 1 mg oral tablet: 1 tab(s) orally once a day clopidogrel 75 mg oral tablet: 1 tab(s) orally once a day DOBUTamine: 4.2 milliliter(s) by continuous intravenous infusion once a day 1000mg in 250cc, infuse at 4.2ml/hr continuously daily, no stop time FLUoxetine (Eqv-PROzac) 20 mg oral tablet: 1 tab(s) orally every 3 days latanoprost 0.005% ophthalmic solution: 1 drop(s) in each eye once a day mirtazapine 15 mg oral tablet: 1 tab(s) orally once a day (at bedtime) pantoprazole 40 mg oral delayed release tablet: 1 tab(s) orally once a day (before a meal) rosuvastatin 20 mg oral tablet: 1 tab(s) orally once a day (at bedtime) tamsulosin 0.4 mg oral capsule: 2 cap(s) orally once a day (at bedtime) temazepam 15 mg oral capsule: 1 cap(s) orally once a day (at bedtime) as needed for insomnia zolpidem 5 mg oral tablet: 1 tab(s) orally once a day (at bedtime) As needed I  Brought in by wife.  Overall feeling well.  OGLESBY worsening with exertion.  Weight and edema has been unchanged since hospitalization per patient and wife.  Denies chest pain, shortness of breath at rest, or palpitations.  Dobutamine drip maintained. Has an appointment with cardiology on Friday.  Will also follow-up with oncology/pulmonology Due to recent BRODY we will check CMP

## 2024-06-18 NOTE — PHYSICAL EXAM
[No Acute Distress] : no acute distress [Well Nourished] : well nourished [Well Developed] : well developed [Well-Appearing] : well-appearing [Normal Sclera/Conjunctiva] : normal sclera/conjunctiva [PERRL] : pupils equal round and reactive to light [EOMI] : extraocular movements intact [Normal Outer Ear/Nose] : the outer ears and nose were normal in appearance [Normal Oropharynx] : the oropharynx was normal [No JVD] : no jugular venous distention [No Lymphadenopathy] : no lymphadenopathy [Supple] : supple [Thyroid Normal, No Nodules] : the thyroid was normal and there were no nodules present [No Respiratory Distress] : no respiratory distress  [No Accessory Muscle Use] : no accessory muscle use [Clear to Auscultation] : lungs were clear to auscultation bilaterally [Normal Rate] : normal rate  [Regular Rhythm] : with a regular rhythm [Normal S1, S2] : normal S1 and S2 [No Murmur] : no murmur heard [No Carotid Bruits] : no carotid bruits [No Abdominal Bruit] : a ~M bruit was not heard ~T in the abdomen [No Varicosities] : no varicosities [Pedal Pulses Present] : the pedal pulses are present [No Edema] : there was no peripheral edema [No Palpable Aorta] : no palpable aorta [No Extremity Clubbing/Cyanosis] : no extremity clubbing/cyanosis [Soft] : abdomen soft [Non Tender] : non-tender [Non-distended] : non-distended [No Masses] : no abdominal mass palpated [No HSM] : no HSM [Normal Bowel Sounds] : normal bowel sounds [Normal Posterior Cervical Nodes] : no posterior cervical lymphadenopathy [Normal Anterior Cervical Nodes] : no anterior cervical lymphadenopathy [No CVA Tenderness] : no CVA  tenderness [No Spinal Tenderness] : no spinal tenderness [No Joint Swelling] : no joint swelling [Grossly Normal Strength/Tone] : grossly normal strength/tone [No Rash] : no rash [Coordination Grossly Intact] : coordination grossly intact [No Focal Deficits] : no focal deficits [Normal Gait] : normal gait [Deep Tendon Reflexes (DTR)] : deep tendon reflexes were 2+ and symmetric [Normal Affect] : the affect was normal [Normal Insight/Judgement] : insight and judgment were intact [de-identified] : +1 edema to bilateral lower extremities, noted significant cardiac murmur [de-identified] : Dusky appearance of bilateral feet [de-identified] : Ecchymosis noted in bilateral upper extremities [33874 - High Complexity requires an extensive number of possible diagnoses and/or the management options, extensive complexity of the medical data (tests, etc.) to be reviewed, and a high risk of significant complications, morbidity, and/or mortality as w] : High Complexity

## 2024-06-18 NOTE — HISTORY OF PRESENT ILLNESS
[FreeTextEntry4] : odules and dobutamine initiated. He refused hospice. He was aggressively diuresed and followed by nephrology. His course was complicated by a superficial phlebitis for which he was given 4 days of vancomycin. PICC line placed and cleared for discharge with home dobutamine infusion.  For full account of hospital course please refer to actual medical records. As this is a brief summery.  Med Reconciliation: Medication Reconciliation Status Admission Reconciliation is Completed Discharge Reconciliation is Completed Discharge Medications aspirin 81 mg oral delayed release tablet: 1 tab(s) orally once a day bumetanide 1 mg oral tablet: 1 tab(s) orally once a day clopidogrel 75 mg oral tablet: 1 tab(s) orally once a day DOBUTamine: 4.2 milliliter(s) by continuous intravenous infusion once a day 1000mg in 250cc, infuse at 4.2ml/hr continuously daily, no stop time FLUoxetine (Eqv-PROzac) 20 mg oral tablet: 1 tab(s) orally every 3 days latanoprost 0.005% ophthalmic solution: 1 drop(s) in each eye once a day mirtazapine 15 mg oral tablet: 1 tab(s) orally once a day (at bedtime) pantoprazole 40 mg oral delayed release tablet: 1 tab(s) orally once a day (before a meal) rosuvastatin 20 mg oral tablet: 1 tab(s) orally once a day (at bedtime) tamsulosin 0.4 mg oral capsule: 2 cap(s) orally once a day (at bedtime) temazepam 15 mg oral capsule: 1 cap(s) orally once a day (at bedtime) as needed for insomnia zolpidem 5 mg oral tablet: 1 tab(s) orally once a day (at bedtime) As needed Insomnia , , Care Plan/Procedures: Discharge Diagnoses, Assessment and Plan of Treatment PRINCIPAL DISCHARGE DIAGNOSIS Diagnosis: Acute on chronic renal failure Assessment and Plan of Treatment: Goal(s) To get better and follow your care plan as instructed. Follow Up: Care Providers for Follow up (PCP/Outpatient Provider) [facility] [address] [place] [address] Phone: [phone] Fax: [phone] Follow Up Time: 2 weeks  Rahman[facility], [facility] [address] [facility], [address] [place] [address] Phone: [phone] Fax: [phone] Follow Up Time: 1 month  [facility] [address] [facility], [address] [place] [address] Phone: [phone] Fax: [phone] Follow Up Time: 2 weeks  [facility] [address] [place] [address] Phone: [phone] Fax: [phone] Follow Up Time: 2 weeks  [facility] [address] [place] [address] Phone: [phone] Fax: [phone] Follow Up Time: 2 weeks Additional Provider Info (For SysAdmin Use Only) PROVIDER:[TOKEN:[[id number]],FOLLOWUP:[2 weeks]],PROVIDER:[TOKEN:[[id number]],FOLLOWUP:[1 month]],PROVIDER:[TOKEN:[[id number]],FOLLOWUP:[2 weeks]],PROVIDER:[TOKEN:[[id number]],FOLLOWUP:[2 weeks]],PROVIDER:[TOKEN:[[id number]],FOLLOWUP:[2 weeks]] Care Providers Direct Addresses (For SYSAdmin Use Only) ,[email or web address] NPI number (For SysAdmin Use Only) : [[id number]],[[id number]] Patient's Scheduled Appointments Us[facility] Physician [facility] FAMILYMED [address] Scheduled Appointment: 06/18/2024  [facility] Physician [facility] HEARTFAIL [address] Scheduled Appointment: 06/21/2024  [facility]re [facility] Physician [facility] RADMED [address] Scheduled Appointment: 06/27/2024  [facility] Walden [facility] Physician [facility] CARDIOLOGY [address] Scheduled Appointment: 07/02/2024 Discharge Diet DASH Diet Activity No restrictions Quality Measures: Does the patient have difficulty running errands alone like visiting a doctors office or shopping? No Does the patient have difficulty climbing stairs? Yes Cognition: The patient has No difficulties Patient Condition Stable Hospice Patient No Core Measure Site Yes Does the patient have a principal diagnosis of ischemic stroke, hemorrhagic stroke, or TIA? No Does the patient have a principal diagnosis of Acute Myocardial Infarction? No Has the patient had a Percutaneous Coronary Intervention? No Tobacco Usage Within the Last Year No Home Health: Discharged with Home Health Care Services? Yes Face-To-Face Contact As certified below, I, or a nurse practitioner or physician assistant working with me, had a face-to-face encounter that meets the physician face-to-face encounter requirements. Need for Skilled Services Central venous access care Based on the above findings, the following intermittent skilled services are medically necessary home health services: Nursing Home Bound Status Fall risk Patient Needs Assistance to Leave Residence... Requires an assistive device to leave home: Walker Assistive device required to leave home due to: end stage heart failure Requires special transportation: Ashley Attending Certification My signature below certifies that the above stated patient is homebound and upon completion of the Face-To-Face encounter, has the need for intermittent skilled nursing, physical therapy and/or speech or occupational therapy services in their home for their current diagnosis as outlined in their initial plan of care. These services will continue to be monitored by myself or another physician. Encounter Date 14-Jun-2024 Document Complete: Care Provider Seen in [facility] Physician Section Complete This document is complete and the patient is ready for discharge. For questions about your prescriptions, please call: [phone] Is this contact telephone number correct? Yes Attending Attestation Statement I have personally seen and examined the patient. I have collaborated with and supervised the . on the discharge service for the patient. I have reviewed and made amendments to the documentation where necessary. 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 Pt was BIBEMS to  ED 6/6 due to lethargy and hypotension. Was admitted to ICU for acute decompensated heart failure in the setting of EF less than 20% with valvular disease and right heart failure, cardiogenic shock with worsening CKD, transaminitis and lactic acidosis. He was seen by cardiology and was deemed not a candidate for RVAD / LVAD based on history of lung cancer with new lung nodules and dobutamine initiated. He refused hospice. He was aggressively diuresed and followed by nephrology. His course was complicated by a superficial phlebitis for which he was given 4 days of vancomycin. PICC line placed and cleared for discharge with home dobutamine infusion.  For full account of hospital course please refer to actual medical records. As this is a brief summery.  Med Reconciliation: Medication Reconciliation Status Admission Reconciliation is Completed Discharge Reconciliation is Completed Discharge Medications aspirin 81 mg oral delayed release tablet: 1 tab(s) orally once a day bumetanide 1 mg oral tablet: 1 tab(s) orally once a day clopidogrel 75 mg oral tablet: 1 tab(s) orally once a day DOBUTamine: 4.2 milliliter(s) by continuous intravenous infusion once a day 1000mg in 250cc, infuse at 4.2ml/hr continuously daily, no stop time FLUoxetine (Eqv-PROzac) 20 mg oral tablet: 1 tab(s) orally every 3 days latanoprost 0.005% ophthalmic solution: 1 drop(s) in each eye once a day mirtazapine 15 mg oral tablet: 1 tab(s) orally once a day (at bedtime) pantoprazole 40 mg oral delayed release tablet: 1 tab(s) orally once a day (before a meal) rosuvastatin 20 mg oral tablet: 1 tab(s) orally once a day (at bedtime) tamsulosin 0.4 mg oral capsule: 2 cap(s) orally once a day (at bedtime) temazepam 15 mg oral capsule: 1 cap(s) orally once a day (at bedtime) as needed for insomnia zolpidem 5 mg oral tablet: 1 tab(s) orally once a day (at bedtime) As needed I  Brought in by wife.  Overall feeling well.  OGLESBY worsening with exertion.  Weight and edema has been unchanged since hospitalization per patient and wife.  Denies chest pain, shortness of breath at rest, or palpitations.  Dobutamine drip maintained. Has an appointment with cardiology on Friday.  Will also follow-up with oncology/pulmonology Due to recent BRODY we will check CMP

## 2024-06-19 LAB
ALBUMIN SERPL ELPH-MCNC: 3.7 G/DL
ALP BLD-CCNC: 476 U/L
ALT SERPL-CCNC: 50 U/L
ANION GAP SERPL CALC-SCNC: 12 MMOL/L
AST SERPL-CCNC: 57 U/L
BASOPHILS # BLD AUTO: 0.05 K/UL
BASOPHILS NFR BLD AUTO: 0.8 %
BILIRUB SERPL-MCNC: 3.1 MG/DL
BUN SERPL-MCNC: 39 MG/DL
CALCIUM SERPL-MCNC: 9.1 MG/DL
CHLORIDE SERPL-SCNC: 100 MMOL/L
CO2 SERPL-SCNC: 23 MMOL/L
CREAT SERPL-MCNC: 1.93 MG/DL
EGFR: 35 ML/MIN/1.73M2
EOSINOPHIL # BLD AUTO: 0.27 K/UL
EOSINOPHIL NFR BLD AUTO: 4.5 %
GLUCOSE SERPL-MCNC: 87 MG/DL
HCT VFR BLD CALC: 34.3 %
HGB BLD-MCNC: 10.5 G/DL
IMM GRANULOCYTES NFR BLD AUTO: 0.2 %
LYMPHOCYTES # BLD AUTO: 0.66 K/UL
LYMPHOCYTES NFR BLD AUTO: 11.1 %
MAGNESIUM SERPL-MCNC: 2.2 MG/DL
MAN DIFF?: NORMAL
MCHC RBC-ENTMCNC: 28.9 PG
MCHC RBC-ENTMCNC: 30.6 GM/DL
MCV RBC AUTO: 94.5 FL
MONOCYTES # BLD AUTO: 0.9 K/UL
MONOCYTES NFR BLD AUTO: 15.1 %
NEUTROPHILS # BLD AUTO: 4.06 K/UL
NEUTROPHILS NFR BLD AUTO: 68.3 %
PLATELET # BLD AUTO: 95 K/UL
POTASSIUM SERPL-SCNC: 3.7 MMOL/L
PROT SERPL-MCNC: 6.6 G/DL
RBC # BLD: 3.63 M/UL
RBC # FLD: 17.7 %
SODIUM SERPL-SCNC: 135 MMOL/L
WBC # FLD AUTO: 5.95 K/UL

## 2024-06-21 ENCOUNTER — APPOINTMENT (OUTPATIENT)
Dept: HEART FAILURE | Facility: CLINIC | Age: 79
End: 2024-06-21
Payer: MEDICARE

## 2024-06-21 ENCOUNTER — NON-APPOINTMENT (OUTPATIENT)
Age: 79
End: 2024-06-21

## 2024-06-21 VITALS
HEART RATE: 92 BPM | SYSTOLIC BLOOD PRESSURE: 100 MMHG | OXYGEN SATURATION: 96 % | BODY MASS INDEX: 25.73 KG/M2 | WEIGHT: 190 LBS | HEIGHT: 72 IN | DIASTOLIC BLOOD PRESSURE: 66 MMHG

## 2024-06-21 DIAGNOSIS — I42.9 CARDIOMYOPATHY, UNSPECIFIED: ICD-10-CM

## 2024-06-21 DIAGNOSIS — D64.9 ANEMIA, UNSPECIFIED: ICD-10-CM

## 2024-06-21 DIAGNOSIS — I50.9 HEART FAILURE, UNSPECIFIED: ICD-10-CM

## 2024-06-21 PROCEDURE — 93000 ELECTROCARDIOGRAM COMPLETE: CPT

## 2024-06-21 PROCEDURE — 99214 OFFICE O/P EST MOD 30 MIN: CPT

## 2024-06-23 NOTE — CONSULT NOTE ADULT - ASSESSMENT
Woke up today with sore throat. Denies fevers, any other symptoms.   
77 year old male with past medical history of HLD, bipolar disorder, anxiety, and BPH.  He presented recently  with STEMI, s/p [facility] 5/11; study remarkable for complete occlusion of RCA, 70% occlusion of LAD, CI 1.7.  Treated with IABP.  Viability scan with minimal viability in the infarcted territories.  Now representing with SOB. TTE today done showing EF 30%, UZAIR 42, severe global LV systolic dysfunction, LV thrombus measuring 1.0 by 1.3 seen in LV apex, the mid anterior septum, the basal anterior septum, the mid anterior wall, and the mid inferoseptum are akinetic. Severe reversible diastolic dysfunction. PASP 35.   Troponin 70 on Cr 1.3, low concern for ACS, no chest pain.      REC:  1.  LV thrombux  --Heparin gtt, begin warfarin dosing once stably therapeutic, goal 2-2.5    2.  CHF  --Consider diuresis, would discharge on 20 oral lasix to treat SOB/maintain euvolemia  --Would uptitrate beta blocker as tolerated.    --Please check lactate given low BP. Eventual reintroduction of losartan if tolerating.  --Strict I/Os at all times. Generally, a gerardo catheter is discouraged due to risk for nosocomial infection.  --Please monitor on telemetry  --Please obtain STANDING daily morning pre-prandial post-voiding weights  --K >4 Mg > 2      3.  CAD s/p recent STEMI  --c/w DAPT.   --Start Lipitor.       Haim Jimenez PGY4  Cardiology Fellow    Plan discussed with cardiology fellow.  Patient seen and examined.  Hx., exam and labs as above.  I agree with the assessment and recommendations. which I have reviewed and edited where appropriate.  Stanislav Luis M.D.  Cardiology Attending, Consult Service  For Cardiology consults and questions, all Cardiology service information can be found 24/7 on [email or web address] - use password: [email or web address] to log in.

## 2024-06-24 ENCOUNTER — APPOINTMENT (OUTPATIENT)
Dept: CT IMAGING | Facility: HOSPITAL | Age: 79
End: 2024-06-24
Payer: MEDICARE

## 2024-06-24 ENCOUNTER — OUTPATIENT (OUTPATIENT)
Dept: OUTPATIENT SERVICES | Facility: HOSPITAL | Age: 79
LOS: 1 days | End: 2024-06-24
Payer: MEDICARE

## 2024-06-24 DIAGNOSIS — Z98.890 OTHER SPECIFIED POSTPROCEDURAL STATES: Chronic | ICD-10-CM

## 2024-06-24 DIAGNOSIS — R91.1 SOLITARY PULMONARY NODULE: ICD-10-CM

## 2024-06-24 LAB
ALBUMIN SERPL ELPH-MCNC: 3.6 G/DL
ALP BLD-CCNC: 478 U/L
ALT SERPL-CCNC: 42 U/L
ANION GAP SERPL CALC-SCNC: 22 MMOL/L
AST SERPL-CCNC: 55 U/L
BILIRUB SERPL-MCNC: 3.3 MG/DL
BUN SERPL-MCNC: 60 MG/DL
CALCIUM SERPL-MCNC: 9.4 MG/DL
CHLORIDE SERPL-SCNC: 95 MMOL/L
CO2 SERPL-SCNC: 19 MMOL/L
CREAT SERPL-MCNC: 2.44 MG/DL
EGFR: 26 ML/MIN/1.73M2
GLUCOSE SERPL-MCNC: 95 MG/DL
MAGNESIUM SERPL-MCNC: 2.4 MG/DL
NT-PROBNP SERPL-MCNC: ABNORMAL PG/ML
POTASSIUM SERPL-SCNC: 4.4 MMOL/L
PROT SERPL-MCNC: 7.1 G/DL
SODIUM SERPL-SCNC: 136 MMOL/L

## 2024-06-24 PROCEDURE — 71250 CT THORAX DX C-: CPT | Mod: 26,MH

## 2024-06-24 PROCEDURE — 71250 CT THORAX DX C-: CPT

## 2024-06-24 NOTE — ASSESSMENT
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 pRIOR RHC showing elevated filling pressures and PA sat 59%, CO 3.63, CI 1.70, PVR 2.76, S/P left lung lesion S/P RT 3/1/23. Pt is here for a post hospital follow up today after admission at [facility] 6/6-6/14/24.  Appears volume overloaded with JVD and edema and with B/P 100/66 on dobutamine gtt

## 2024-06-24 NOTE — CARDIOLOGY SUMMARY
[de-identified] : 6/21/24  NSR 94, LAFB, NSST 3/7/24 NSR with 1 st degree AVB 73, LAFB, NSST 1/4/24 NSR with 1 st degree AVB 61, LAFB, NSST 11/16/23 NSR 61 with 1 st degree AVB 61. LAFB, PRWP, NSST 11/2/23 NSR 68 with 1 st degree AVB 61. LAFB, PRWP, NSST 10/23/23 NSR 68 with 1 st degree AVB 63. LAFB, PRWP, NSST 10/16/23  NSR 74 with 1 st degree AVB 63. LAFB, PRWP, NSST 8/10/23 NSR 67 with 1 st degree AVB 63. LAFB, PRWP, NSST 5/4/23 NSR 67 with 1 st degree AVB 63. LAFB, PRWP, NSST 3/6/23 NSR with 1 st degree AVB 63. LAFB, PRWP, NSST 1/26/23 NSR with 1 st degree AVB 74. LAFB, PRWP, NSST 11/28/22 NSR with 1 st degree AVB 70. LAFB, PRWP, NSST 10/27/22  NSR 71,  LAFB, PRWP, NSST  10/13/22 NSR 98, LAFB, PRWP, NSST with PVC's [de-identified] : 7/31/23 TTE with LVEF 23%, LVIDD 6 cm, mod severe MR, mild diastolic dysfunction, severe LV systolic dysfunction, no LV thrombus, decreased RV systolic function  TTE 1/4/23 with LVEF 27%, LVIDD 6.5 cm , no LV thrombus, severe LV systolic dysfunction, decreased RV systolic dysfunction, severe MR, mild TR.    11/28/22 TTE; LVEF 22%, LVIDD 6.1 cm, mod MR, severe global LV systolic dysfunction, moderate diastolic dysfunction Stage II, with decreased RV systolic function, severe pHTN  9/24/22 TTE; LVEF 34%, LVIDD 6.1 cm, mod MR, mild AR, normal LA, severe global LV systolic dysfunction, moderate diastolic dysfunction Stage II, normal RV systolic function. mod TR, RV systolic function 82 mmHg c/q severe pHTN [de-identified] : The left ventricle was normal in size. The mid to distal\par  anterior, mid to distal anteroseptal, apical, and inferior\par  walls do not demonstrate significant viability. The\par  septum demonstrates mild viability. The remaining segments\par  are viable (predominantly the lateral wall only).\par  He had a viability study with reported left ventricle was normal in size. The mid to distal anterior, mid to distal anteroseptal, apical, and inferior walls do not demonstrate significant viability. The septum demonstrates mild viability. The remaining segments are viable (predominantly the lateral wall only). Discussed with interventional card Dr. Helms and opted to manage medically. Since there has been a decline in LVEF to 225, it was decided to repeat Cardiac viability to see if there were any areas that could be revascularized. [de-identified] : 9/2022 RHC: RA 15, PCWP 31, PA sat 59%, CO 3.63, CI 1.70, PVR 2.76.\par  9/2022:  s/p R/LHC with IABP support which showed  mid %, LAD 70%, D1 70% and RA 15,\par  PCWP 31, PA sat 59%, CO 3.63, CI 1.70, PVR 2.76; medical management\par  \par  Cardiac Catheterization (05.11.22 @ 15:16) >\par  Intra-aortic Balloon Pump\par  An intra-aortic balloon pump was inserted.\par  Diagnostic Findings:\par  Coronary Angiography\par  The coronary circulation is right dominant.\par  LM\par  Left main artery: Angiography shows no disease.\par  LAD\par  Proximal left anterior descending: There is a 70 % stenosis. First\par  diagonal: There is a 70 % stenosis.\par  Patient: ELYSE MALAVE MRN: [id number]\par  Study Date: 05/11/2022 03:16 PM Page 1 of 4\par  CX\par  Circumflex: Angiography shows no disease.\par  RCA\par  Mid right coronary artery: There is a 100 % stenosis.\par  Exam Start Time: 03:16 PM\par  Exam End Time: 03:53 PM\par  Exam Duration: 37 min\par  Patient: ELYSE MALAVE MRN: [id number]\par  Study Date: 05/11/2022 03:16 PM Page 2 of 4\par  Hemodynamic Pressures:\par  Phase Location [mmHg] [mmHg]\par  [mmHg] HR\par  Baseline LV s 115\par  ed 36 88\par  Baseline Ao s 126 d 85\par  m 102 103\par  Baseline RV s 50\par  ed 17 81\par  Baseline PA s 56 d 31\par  m 41 81\par  Baseline PCW a 32 v 34\par  m 31 77\par  Baseline RA a 20 v 16\par  m 15 95\par  \par   [de-identified] : 1/25/23 doppler of legs with no DVT bilaterally  [de-identified] : PET/CT 1/25/23. Dr. Au reviewed results with patient and his wife notable for hypermetabolic HARRY opacity highly suspicious for malignancy, small left pleural effusion.  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 small left pleural effusion on   1/3/2023- CTA chest showing mildly loculated R pleural effusion and a 1.7cm L upper lobe  pulmonary nodule

## 2024-06-24 NOTE — HISTORY OF PRESENT ILLNESS
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 pRIOR RHC showing elevated filling pressures and PA sat 59%, CO 3.63, CI 1.70, PVR 2.76, S/P left lung lesion S/P RT 3/1/23. Pt is here for a post hospital follow up today after admission at [facility] 6/6-6/14/24.   Course in hospital: Pt was BIBEMS to  ED 6/6/24 due to lethargy and hypotension. Was admitted to ICU for acute decompensated heart failure inthe setting of EF less than 20% with valvular disease and right heart failure, cardiogenic shock with worsening CKD, transaminitis and lactic acidosis. He was seen by cardiology and was deemed not a candidate for RVAD / LVAD based on history of lung cancer with new lung nodules and dobutamine initiated.   Since d/c, pt states his weight has been in the range of 189-190 lbs but has increased swelling in his legs and scrotum  with abdominal distention. Reports he has trouble sleeping with some orthopnea. [facility] care is coming to follow up with dobutamine gtt with PICC line dressing changes.  He was taken off most medications and is no longer on temazepam at night.   States he feels better since starting the dobutamine and can walk room to room without dyspnea.   Denies chest pain, palpitations, SOB, syncope or near syncope.

## 2024-06-24 NOTE — PHYSICAL EXAM
[No Acute Distress] : no acute distress [No Carotid Bruit] : no carotid bruit [Normal S1, S2] : normal S1, S2 [No Gallop] : no gallop [Clear Lung Fields] : clear lung fields [Good Air Entry] : good air entry [No Respiratory Distress] : no respiratory distress  [Non Tender] : non-tender [Normal] : alert and oriented, normal memory [de-identified] : Elderly male [de-identified] : JVP elevated 10 cm [de-identified] : RRR [de-identified] : Decreased at bases [de-identified] : softly distended [de-identified] : slow gait [de-identified] : trace edema of lower extremities 2+ with scrotal edema

## 2024-06-24 NOTE — DISCUSSION/SUMMARY
[Patient] : the patient [EKG obtained to assist in diagnosis and management of assessed problem(s)] : EKG obtained to assist in diagnosis and management of assessed problem(s) [FreeTextEntry2] : wife [FreeTextEntry3] : 4 weeks [FreeTextEntry1] : 1. Chronic systolic HF- Stage D: f EF less than 20% with valvular disease and right heart failure, cardiogenic shock  - deemed not a candidate for RVAD / LVAD based on history of lung cancer with new lung nodules and dobutamine initiated.  - continue on IV dobutamine via LUE PICC line, will add labs to next blood draw including serum pro BNP and Mag due to leg cramps - Bumex to 1 mg po bid from qd. - prior goal weight < 175-177 lbs and he is 189 lbs today - pt is off metoprolol, losartan, iso - previously S/P thoracentesis with 1 liter removed bilaterally - pt is deferring ICD   2. Adenocarcinoma, lung, - follow up with oncologist  Follow up in office in 8 weeks with Dr. Shell

## 2024-06-28 ENCOUNTER — NON-APPOINTMENT (OUTPATIENT)
Age: 79
End: 2024-06-28

## 2024-06-28 NOTE — ASSESSMENT
[FreeTextEntry1] : PET/CT scan report, lab work, 5/9/24 radiation oncology note reviewed. #1) 9/2022-Stage IB (pT2aN0) adenocarcinoma of the lung, moderately differentiated-status post right upper lobectomy/lymph node sampling. Intraoperative course complicated by cardiogenic shock. PD-L1 () TPS 0%. ALK negative. EGFR mutation analysis-+ EGFR mutation ([id number], CTG>CGG).  Per NCCN guidelines, may consider observation with expectant surveillance or chemotherapy for high risk patients and osimertinib (if EGFR exon 19 deletion or [id number]). Examples of high risk features may include poorly differentiated tumors, vascular invasion, wedge resection, tumors greater than 4 cm, visceral pleural involvement or unknown lymph node status-these features do not apply in this case-in light of this and patient's comorbidities, favored expectant surveillance approach. Had discussed the EGFR mutation with potential implications in the future with treatment available if needed.  1/3/2023-CT angiogram of the chest-negative for PE. Mildly increased moderate loculated right pleural effusion since 12/27/2022. Indeterminant 1.7 cm apical posterior left upper lobe nodule, enlarged since 7/2022 (4 mm). 1/9/2023-S/P Right thoracentesis, 850 ml fluid was drained. Path revealed negative for malignant cells.  1/25/2023-PET/CT scan-hypermetabolic left upper lobe opacity highly suspicious for malignancy. Small left pleural effusion. No hypermetabolic hilar or mediastinal nodes. Postsurgical changes in the right upper lung field. No suspicious FDG avid lesion at the surgical site. Right pleural effusion slightly decreased from the recent CT chest. Contiguous foci of activity localizing to the vertebral ends of the left 10th-12th ribs with a new fracture in the 11th rib since the recent CT angiogram of the chest. Pattern of abnormalities compatible with traumatic injury, however, in the presence of a hypermetabolic left upper lobe mass, need to follow closely.  So, had enlarging left lung lesion suggestive of neoplastic disease-?second primary vs. met. lesion. Patient saw thoracic surgery and was deemed not to be a surgical candidate in light of comorbidities. He was referred to radiation oncology for radiation therapy-completed 3/1/2023-5 Fx/ 5000 cGy. Treatment Site: Left Lung. Had discussed potential role for systemic therapy-note +EGFR mutation with right-sided tumor. However, F/U liquid biopsy at this time -1/31/2023-no therapies associated with the genomic findings in this sample. KATHY, 3 Muts/Mb.  5/1/2023-CT chest-status post right upper lobectomy. No significant change in the 1.6 cm lobulated nodule in the left upper lobe when compared to previous exam. Once again, exact etiology is unclear. Patient with multiple comorbidities, so expectant surveillance.  8/8/2023-PET/CT scan-redemonstration of a left upper lobe lobulated lung nodule that is stable in size (anatomically) and has slightly decreased metabolically (FDG uptake). Continued follow-up as clinically indicated. 12/18/2023-CT chest-1. Post right upper lobe lobectomy. No evidence of local disease recurrence. 2. Findings compatible with small large airways inflammation more pronounced when compared to prior imaging. 3. A nodular lesion within the apical posterior segment of the left upper lobe which appears more nodular configuration measuring 32 x 20 mm (333:142). Findings concerning for left upper lobe neoplasm and correlation with whole-body PET/CT is suggested. 4. Bibasilar pleural effusions which have decreased slightly in extent on the right side. 5. Mediastinal as well as paraesophageal lymphadenopath slightly more pronounced. 6. New groundglass opacity within the superior aspect of the residual right middle lobe. Short interval surveillance in 3-6 months is suggested. 1/14/2024-PET/CT scan-  1. Nodular component of the left upper lobe apical segment changes, similar to the most recent CT chest, has increased in size since the prior PET/CT in 8/2023 with persistent increased activity. Increased left pleural effusion since the most recent CT chest. Left upper lobe nodular opacity is suspicious for malignant process; can be percutaneously biopsied medial to the left scapula under CT guidance. 2. Stable postsurgical changes in the right upper lung field with no abnormal activity; right middle lobe subpleural groundglass opacities with patchy activity may reflect inflammatory processes. Reassess on follow-up. 3. No suspicious FDG avid malignant lesion in the remaining field of view. --had discussed scan results and management options.  To consider biopsy of increasing lesion.  However, radiation oncology input noted/appreciated-may have postradiation changes still, and short interval repeat CT scan of the chest was advised-planned 4/2024-patient/wife wished for this approach.  1/14/2024-PET/CT scan- 1. Nodular component of the left upper lobe apical segment changes, similar to the most recent CT chest, has increased in size since the prior PET/CT in 8/2023 with persistent increased activity. Increased left pleural effusion since the most recent CT chest. Left upper lobe nodular opacity is suspicious for malignant process; can be percutaneously biopsied medial to the left scapula under CT guidance. 2. Stable postsurgical changes in the right upper lung field with no abnormal activity; right middle lobe subpleural groundglass opacities with patchy activity may reflect inflammatory processes. Reassess on follow-up. 3. No suspicious FDG avid malignant lesion in the remaining field of view.  4/1/2024-CT chest-Left upper lobe nodule within the radiation field has a more rounded contour, and gradually become more discrete over multiple prior studies. Neoplasm should be considered.  4/1/2024-CT chest-Left upper lobe nodule within the radiation field has a more rounded contour, and gradually become more discrete over multiple prior studies. Neoplasm should be considered. 5/5/2024-PET/CT scan- 1.  Left UPPER lobe pulmonary nodule appears increasingly rounded solid, with quantitatively stable uptake. Malignancy is suspected and tissue sampling may be helpful. 2.  Ill-defined hazy/groundglass findings in the lateral RIGHT LOWER lobe now demonstrates focal uptake appears more prominent than on recent CT.. Malignant involvement is not excluded. 3.  New RIGHT pleural effusion. The LEFT lung nodule does appear increasingly rounded solid, and although the degree of uptake there is essentially unchanged, degenerative changes on CT alone, close clinical surveillance with repeat chest CT, versus tissue sampling may be helpful. The finding in the RIGHT lung appears to have slowly become denser over time, with the appearance of uptake there are also appearing slightly more prominent. For this finding as well, tissue sampling versus close clinical surveillance may be helpful.  --clinically stable at present --short interval f/u CT chest per radiation oncology  #2) h/o anemia/thrombocytopenia. h/o Intermittent mild epistaxis and renal insufficiency suspect contribute to the anemia. Most recent hemoglobin and platelet count available, WNL. Follow CBC.  --Should hematologic scenario worsen, can consider further evaluation to r/o evolving BM disorder, should patient consent.  #3) continue F/U with cardiologist for elevated creatinine/BP med adjustment as needed; continue f/u with PCP for primary care issues.  #4) h/o elevated LFT's-liver on 1/17/2024 PET/CT scan unremarkable.?  Medication related, liver congestion.  F/U PET/CT scan to be done as well, as above. Continue f/u with PCP.  Patient was given the opportunity to ask questions. His questions have been answered to his apparent satisfaction at this time.  -->Slo-Fe QOD; RTO 6 weeks.

## 2024-06-28 NOTE — PHYSICAL EXAM
[Fully active, able to carry on all pre-disease performance without restriction] : Status 0 - Fully active, able to carry on all pre-disease performance without restriction [Normal] : affect appropriate [de-identified] : breathing appeared unlabored; slightly decreased BS at bases [de-identified] : coloration appeared normal

## 2024-06-28 NOTE — REASON FOR VISIT
[Home] : at home, [unfilled] , at the time of the visit. [Medical Office: ([facility])___] : at the medical office located in  [Spouse] : spouse [Patient] : the patient [FreeTextEntry2] : lung cancer

## 2024-06-28 NOTE — HISTORY OF PRESENT ILLNESS
[Disease: _____________________] : Disease: [unfilled] [T: ___] : T[unfilled] [N: ___] : N[unfilled] [AJCC Stage: ____] : AJCC Stage: [unfilled] [de-identified] : Moderately differentiated invasive acinar adenocarcinoma [de-identified] : 9/2022-PD-L1 () TPS 0%. ALK negative.  EGFR mutation analysis-+ EGFR mutation ([id number], CTG>CGG).\par  1/31/2023-Liquid biopsy-no therapies associated with the genomic findings in this sample. KATHY, 3 Muts/Mb.  [de-identified] : Male, never smoker, w/ hx of bipolar, BPH, SCC of leg, CHF, CAD, hospitalized on May 11-17/2022 for acute MI s/p cardiac Cath with no intervention (LHC showed 100% occlusion for right coronary artery and 70% occlusion of left anterior descending artery. The arteries were not intervened upon due to viability study showing no viability in the areas supplied by the occluded arteries), cardiogenic shock, found to have new 1x 1.3cm LV thrombus on Plavix and Coumadin (Coumadin completed on 08/26/2022), who f/u with cardiologist and c/o SOB, CXR showed RUL nodule.  7/11/2022-CT chest-3.5 cm part solid mass is noted in the right upper lobe as described above. Primary consideration is lung carcinoma.  7/28/2022-PET/CT scan-FDG avid partly solid right upper lung nodule suspicious for malignancy. Small bilateral pleural effusion with minimal FDG avidity, nonspecific. Nonspecific mildly FDG avid low-attenuation left adrenal nodule, possibly an adenoma. Heterogeneous FDG activity in the soft tissue associated with surgical clips overlying the symphysis pubis, probably related from prior procedure.  9/14/20226838-[id number] biopsy of right upper lung mass-positive for malignant cells, adenocarcinoma. PD-L1 () TPS 0%. ALK negative. EGFR mutation analysis-+ EGFR mutation ([id number], CTG>CGG).  9/23/2022-Status post right VATS-right upper lobectomy-pathology revealed adenocarcinoma, acinar predominant, margin negative. One level 10 lymph node excised and 5 interlobar lymph nodes negative for carcinoma. Right pleural fluid negative for malignant cells. Intraoperative course complicated by cardiogenic shock requiring inotropic support and diuretics.  10/21/2020 2-2 cm lobulated opacity in the left upper lobe, new compared to prior exam.  1/3/2023-CT angiogram of the chest-negative for PE. Mildly increased moderate loculated right pleural effusion since 12/27/2022. Indeterminant 1.7 cm apical posterior left upper lobe nodule, enlarged since 7/2022 (4 mm).  1/25/2023-PET/CT scan-hypermetabolic left upper lobe opacity highly suspicious for malignancy. Small left pleural effusion. No hypermetabolic hilar or mediastinal nodes. Postsurgical changes in the right upper lung field. No suspicious FDG avid lesion at the surgical site. Right pleural effusion slightly decreased from the recent CT chest. Contiguous foci of activity localizing to the vertebral ends of the left 10th-12th ribs with a new fracture in the 11th rib since the recent CT angiogram of the chest. Pattern of abnormalities compatible with traumatic injury, however, in the presence of a hypermetabolic left upper lobe mass, need to follow closely.  1/9/2023-S/P Right thoracentesis, 850 ml fluid was drained. Path revealed negative for malignant cells.  3/1/2023-Completed radiation therapy. Planned Dose: 5 Fx/ 5000 cGy. Treatment Site: Left Lung.  8/8/2023-PET/CT scan-Redemonstration of a left upper lobe lobulated lung nodule that is stable in size (anatomically) and has slightly decreased metabolically (FDG uptake). Continued follow-up as clinically indicated.  12/18/2023-CT chest-1. Post right upper lobe lobectomy. No evidence of local disease recurrence. 2. Findings compatible with small large airways inflammation more pronounced when compared to prior imaging. 3. A nodular lesion within the apical posterior segment of the left upper lobe which appears more nodular configuration measuring 32 x 20 mm (333:142). Findings concerning for left upper lobe neoplasm and correlation with whole-body PET/CT is suggested. 4. Bibasilar pleural effusions which have decreased slightly in extent on the right side. 5. Mediastinal as well as paraesophageal lymphadenopath slightly more pronounced. 6. New groundglass opacity within the superior aspect of the residual right middle lobe. Short interval surveillance in 3-6 months is suggested.  1/14/2024-PET/CT scan-  1. Nodular component of the left upper lobe apical segment changes, similar to the most recent CT chest, has increased in size since the prior PET/CT in 8/2023 with persistent increased activity. Increased left pleural effusion since the most recent CT chest. Left upper lobe nodular opacity is suspicious for malignant process; can be percutaneously biopsied medial to the left scapula under CT guidance. 2. Stable postsurgical changes in the right upper lung field with no abnormal activity; right middle lobe subpleural groundglass opacities with patchy activity may reflect inflammatory processes. Reassess on follow-up. 3. No suspicious FDG avid malignant lesion in the remaining field of view.  4/1/2024-CT chest-Left upper lobe nodule within the radiation field has a more rounded contour, and gradually become more discrete over multiple prior studies. Neoplasm should be considered. 5/5/2024-PET/CT scan- 1.  Left UPPER lobe pulmonary nodule appears increasingly rounded solid, with quantitatively stable uptake. Malignancy is suspected and tissue sampling may be helpful. 2.  Ill-defined hazy/groundglass findings in the lateral RIGHT LOWER lobe now demonstrates focal uptake appears more prominent than on recent CT.. Malignant involvement is not excluded. 3.  New RIGHT pleural effusion. The LEFT lung nodule does appear increasingly rounded solid, and although the degree of uptake there is essentially unchanged, degenerative changes on CT alone, close clinical surveillance with repeat chest CT, versus tissue sampling may be helpful. The finding in the RIGHT lung appears to have slowly become denser over time, with the appearance of uptake there are also appearing slightly more prominent. For this finding as well, tissue sampling versus close clinical surveillance may be helpful.    [de-identified] : Disease: Right upper lung   Pathology: Moderately differentiated invasive acinar adenocarcinoma   TNM stage: T2a (3.5 cm), N0 AJCC Stage: IB  9/2022-PD-L1 () TPS 0%. ALK negative. EGFR mutation analysis-+ EGFR mutation ([id number], CTG>CGG). 1/31/2023-Liquid biopsy-no therapies associated with the genomic findings in this sample. KATHY, 3 Muts/Mb. [de-identified] :   --S/P hospitalization for near-syncope. RT MD ordered PET scan-pending scheduling. Feels well currently.  No current complaints of chest pain; no hemoptysis; no fevers.  No complaints of bone pain.  No GI complaints. Remains on Plavix. Started slo-Fe.  Sees Dr. Yu regularly (Psych). Has anxiety, insomnia.

## 2024-07-01 ENCOUNTER — INPATIENT (INPATIENT)
Facility: HOSPITAL | Age: 79
LOS: 9 days | Discharge: HOME HEALTH | End: 2024-07-11
Attending: INTERNAL MEDICINE | Admitting: INTERNAL MEDICINE
Payer: MEDICARE

## 2024-07-01 VITALS
TEMPERATURE: 97 F | RESPIRATION RATE: 17 BRPM | SYSTOLIC BLOOD PRESSURE: 104 MMHG | OXYGEN SATURATION: 97 % | HEIGHT: 72 IN | DIASTOLIC BLOOD PRESSURE: 59 MMHG | HEART RATE: 81 BPM

## 2024-07-01 DIAGNOSIS — Z98.890 OTHER SPECIFIED POSTPROCEDURAL STATES: Chronic | ICD-10-CM

## 2024-07-01 DIAGNOSIS — I50.23 ACUTE ON CHRONIC SYSTOLIC (CONGESTIVE) HEART FAILURE: ICD-10-CM

## 2024-07-01 DIAGNOSIS — I50.9 HEART FAILURE, UNSPECIFIED: ICD-10-CM

## 2024-07-01 LAB
ALBUMIN SERPL ELPH-MCNC: 3.5 G/DL — SIGNIFICANT CHANGE UP (ref 3.3–5)
ALP SERPL-CCNC: 425 U/L — HIGH (ref 40–120)
ALT FLD-CCNC: 36 U/L — SIGNIFICANT CHANGE UP (ref 4–41)
ANION GAP SERPL CALC-SCNC: 23 MMOL/L — HIGH (ref 7–14)
ANISOCYTOSIS BLD QL: SLIGHT — SIGNIFICANT CHANGE UP
AST SERPL-CCNC: 75 U/L — HIGH (ref 4–40)
BASE EXCESS BLDV CALC-SCNC: -2.5 MMOL/L — LOW (ref -2–3)
BASOPHILS # BLD AUTO: 0.03 K/UL — SIGNIFICANT CHANGE UP (ref 0–0.2)
BASOPHILS NFR BLD AUTO: 0.6 % — SIGNIFICANT CHANGE UP (ref 0–2)
BILIRUB SERPL-MCNC: 3.2 MG/DL — HIGH (ref 0.2–1.2)
BLOOD GAS VENOUS COMPREHENSIVE RESULT: SIGNIFICANT CHANGE UP
BUN SERPL-MCNC: 97 MG/DL — HIGH (ref 7–23)
CALCIUM SERPL-MCNC: 8.9 MG/DL — SIGNIFICANT CHANGE UP (ref 8.4–10.5)
CHLORIDE BLDV-SCNC: 95 MMOL/L — LOW (ref 96–108)
CHLORIDE SERPL-SCNC: 92 MMOL/L — LOW (ref 98–107)
CO2 BLDV-SCNC: 22.6 MMOL/L — SIGNIFICANT CHANGE UP (ref 22–26)
CO2 SERPL-SCNC: 19 MMOL/L — LOW (ref 22–31)
CREAT SERPL-MCNC: 3.09 MG/DL — HIGH (ref 0.5–1.3)
EGFR: 20 ML/MIN/1.73M2 — LOW
EOSINOPHIL # BLD AUTO: 0.06 K/UL — SIGNIFICANT CHANGE UP (ref 0–0.5)
EOSINOPHIL NFR BLD AUTO: 1.3 % — SIGNIFICANT CHANGE UP (ref 0–6)
GAS PNL BLDV: 131 MMOL/L — LOW (ref 136–145)
GLUCOSE BLDV-MCNC: 125 MG/DL — HIGH (ref 70–99)
GLUCOSE SERPL-MCNC: 127 MG/DL — HIGH (ref 70–99)
HCO3 BLDV-SCNC: 22 MMOL/L — SIGNIFICANT CHANGE UP (ref 22–29)
HCT VFR BLD CALC: 33.5 % — LOW (ref 39–50)
HCT VFR BLDA CALC: 36 % — LOW (ref 39–51)
HGB BLD CALC-MCNC: 12 G/DL — LOW (ref 12.6–17.4)
HGB BLD-MCNC: 11.5 G/DL — LOW (ref 13–17)
IANC: 3.05 K/UL — SIGNIFICANT CHANGE UP (ref 1.8–7.4)
IMM GRANULOCYTES NFR BLD AUTO: 0.4 % — SIGNIFICANT CHANGE UP (ref 0–0.9)
LACTATE BLDV-MCNC: 3.4 MMOL/L — HIGH (ref 0.5–2)
LYMPHOCYTES # BLD AUTO: 0.57 K/UL — LOW (ref 1–3.3)
LYMPHOCYTES # BLD AUTO: 12.3 % — LOW (ref 13–44)
MAGNESIUM SERPL-MCNC: 2.5 MG/DL — SIGNIFICANT CHANGE UP (ref 1.6–2.6)
MCHC RBC-ENTMCNC: 30.1 PG — SIGNIFICANT CHANGE UP (ref 27–34)
MCHC RBC-ENTMCNC: 34.3 GM/DL — SIGNIFICANT CHANGE UP (ref 32–36)
MCV RBC AUTO: 87.7 FL — SIGNIFICANT CHANGE UP (ref 80–100)
MONOCYTES # BLD AUTO: 0.91 K/UL — HIGH (ref 0–0.9)
MONOCYTES NFR BLD AUTO: 19.6 % — HIGH (ref 2–14)
NEUTROPHILS # BLD AUTO: 3.05 K/UL — SIGNIFICANT CHANGE UP (ref 1.8–7.4)
NEUTROPHILS NFR BLD AUTO: 65.8 % — SIGNIFICANT CHANGE UP (ref 43–77)
NRBC # BLD: 0 /100 WBCS — SIGNIFICANT CHANGE UP (ref 0–0)
NRBC # FLD: 0.02 K/UL — HIGH (ref 0–0)
NT-PROBNP SERPL-SCNC: HIGH PG/ML
PCO2 BLDV: 34 MMHG — LOW (ref 42–55)
PH BLDV: 7.41 — SIGNIFICANT CHANGE UP (ref 7.32–7.43)
PHOSPHATE SERPL-MCNC: 6.2 MG/DL — HIGH (ref 2.5–4.5)
PLAT MORPH BLD: NORMAL — SIGNIFICANT CHANGE UP
PLATELET # BLD AUTO: 161 K/UL — SIGNIFICANT CHANGE UP (ref 150–400)
PLATELET COUNT - ESTIMATE: NORMAL — SIGNIFICANT CHANGE UP
PO2 BLDV: 33 MMHG — SIGNIFICANT CHANGE UP (ref 25–45)
POIKILOCYTOSIS BLD QL AUTO: SLIGHT — SIGNIFICANT CHANGE UP
POLYCHROMASIA BLD QL SMEAR: SLIGHT — SIGNIFICANT CHANGE UP
POTASSIUM BLDV-SCNC: 2.9 MMOL/L — CRITICAL LOW (ref 3.5–5.1)
POTASSIUM SERPL-MCNC: 2.8 MMOL/L — CRITICAL LOW (ref 3.5–5.3)
POTASSIUM SERPL-SCNC: 2.8 MMOL/L — CRITICAL LOW (ref 3.5–5.3)
PROT SERPL-MCNC: 7.5 G/DL — SIGNIFICANT CHANGE UP (ref 6–8.3)
RBC # BLD: 3.82 M/UL — LOW (ref 4.2–5.8)
RBC # FLD: 17.4 % — HIGH (ref 10.3–14.5)
RBC BLD AUTO: ABNORMAL
SAO2 % BLDV: 44.8 % — LOW (ref 67–88)
SODIUM SERPL-SCNC: 134 MMOL/L — LOW (ref 135–145)
TROPONIN T, HIGH SENSITIVITY RESULT: 174 NG/L — CRITICAL HIGH
WBC # BLD: 4.64 K/UL — SIGNIFICANT CHANGE UP (ref 3.8–10.5)
WBC # FLD AUTO: 4.64 K/UL — SIGNIFICANT CHANGE UP (ref 3.8–10.5)

## 2024-07-01 PROCEDURE — 71045 X-RAY EXAM CHEST 1 VIEW: CPT | Mod: 26,77

## 2024-07-01 PROCEDURE — 99291 CRITICAL CARE FIRST HOUR: CPT | Mod: FS

## 2024-07-01 PROCEDURE — 36010 PLACE CATHETER IN VEIN: CPT

## 2024-07-01 PROCEDURE — 99285 EMERGENCY DEPT VISIT HI MDM: CPT | Mod: FS

## 2024-07-01 PROCEDURE — 71045 X-RAY EXAM CHEST 1 VIEW: CPT | Mod: 26

## 2024-07-01 RX ORDER — LATANOPROST PF 0.05 MG/ML
1 SOLUTION/ DROPS OPHTHALMIC AT BEDTIME
Refills: 0 | Status: DISCONTINUED | OUTPATIENT
Start: 2024-07-01 | End: 2024-07-11

## 2024-07-01 RX ORDER — TEMAZEPAM 15 MG/1
1 CAPSULE ORAL
Refills: 0 | DISCHARGE

## 2024-07-01 RX ORDER — AMIODARONE HYDROCHLORIDE 50 MG/ML
0.5 INJECTION, SOLUTION INTRAVENOUS
Qty: 450 | Refills: 0 | Status: DISCONTINUED | OUTPATIENT
Start: 2024-07-01 | End: 2024-07-02

## 2024-07-01 RX ORDER — DOBUTAMINE HCL 250MG/20ML
5 VIAL (ML) INTRAVENOUS
Qty: 500 | Refills: 0 | Status: DISCONTINUED | OUTPATIENT
Start: 2024-07-01 | End: 2024-07-02

## 2024-07-01 RX ORDER — POTASSIUM CHLORIDE 600 MG/1
20 TABLET, FILM COATED, EXTENDED RELEASE ORAL
Refills: 0 | Status: COMPLETED | OUTPATIENT
Start: 2024-07-01 | End: 2024-07-01

## 2024-07-01 RX ORDER — NOREPINEPHRINE BITARTRATE 1 MG/ML
0.05 INJECTION INTRAVENOUS
Qty: 8 | Refills: 0 | Status: DISCONTINUED | OUTPATIENT
Start: 2024-07-01 | End: 2024-07-01

## 2024-07-01 RX ORDER — METOLAZONE 5 MG/1
1 TABLET ORAL
Refills: 0 | DISCHARGE

## 2024-07-01 RX ORDER — CLOPIDOGREL BISULFATE 75 MG/1
75 TABLET, FILM COATED ORAL DAILY
Refills: 0 | Status: DISCONTINUED | OUTPATIENT
Start: 2024-07-01 | End: 2024-07-11

## 2024-07-01 RX ORDER — FLUOXETINE HCL 40 MG
20 CAPSULE ORAL
Refills: 0 | Status: DISCONTINUED | OUTPATIENT
Start: 2024-07-01 | End: 2024-07-11

## 2024-07-01 RX ORDER — FLUOXETINE HCL 10 MG
1 CAPSULE ORAL
Refills: 0 | DISCHARGE

## 2024-07-01 RX ORDER — SODIUM CHLORIDE 0.9 % (FLUSH) 0.9 %
10 SYRINGE (ML) INJECTION
Refills: 0 | Status: DISCONTINUED | OUTPATIENT
Start: 2024-07-01 | End: 2024-07-11

## 2024-07-01 RX ORDER — ZOLPIDEM TARTRATE 10 MG
5 TABLET ORAL AT BEDTIME
Refills: 0 | Status: DISCONTINUED | OUTPATIENT
Start: 2024-07-01 | End: 2024-07-02

## 2024-07-01 RX ORDER — AMIODARONE HYDROCHLORIDE 50 MG/ML
150 INJECTION, SOLUTION INTRAVENOUS ONCE
Refills: 0 | Status: COMPLETED | OUTPATIENT
Start: 2024-07-01 | End: 2024-07-01

## 2024-07-01 RX ORDER — AMIODARONE HYDROCHLORIDE 50 MG/ML
1 INJECTION, SOLUTION INTRAVENOUS
Qty: 450 | Refills: 0 | Status: DISCONTINUED | OUTPATIENT
Start: 2024-07-01 | End: 2024-07-01

## 2024-07-01 RX ORDER — MIRTAZAPINE 15 MG/1
15 TABLET, FILM COATED ORAL AT BEDTIME
Refills: 0 | Status: DISCONTINUED | OUTPATIENT
Start: 2024-07-01 | End: 2024-07-11

## 2024-07-01 RX ORDER — TAMSULOSIN HYDROCHLORIDE 0.4 MG/1
0.8 CAPSULE ORAL AT BEDTIME
Refills: 0 | Status: DISCONTINUED | OUTPATIENT
Start: 2024-07-01 | End: 2024-07-11

## 2024-07-01 RX ORDER — BUMETANIDE 0.25 MG/ML
4 INJECTION INTRAMUSCULAR; INTRAVENOUS ONCE
Refills: 0 | Status: COMPLETED | OUTPATIENT
Start: 2024-07-01 | End: 2024-07-01

## 2024-07-01 RX ORDER — ASPIRIN 325 MG/1
81 TABLET, FILM COATED ORAL DAILY
Refills: 0 | Status: DISCONTINUED | OUTPATIENT
Start: 2024-07-01 | End: 2024-07-11

## 2024-07-01 RX ORDER — HEPARIN SODIUM 50 [USP'U]/ML
5000 INJECTION, SOLUTION INTRAVENOUS EVERY 12 HOURS
Refills: 0 | Status: DISCONTINUED | OUTPATIENT
Start: 2024-07-01 | End: 2024-07-11

## 2024-07-01 RX ORDER — PANTOPRAZOLE SODIUM 40 MG/10ML
40 INJECTION, POWDER, FOR SOLUTION INTRAVENOUS
Refills: 0 | Status: DISCONTINUED | OUTPATIENT
Start: 2024-07-01 | End: 2024-07-11

## 2024-07-01 RX ORDER — DOBUTAMINE HCL 250MG/20ML
5 VIAL (ML) INTRAVENOUS
Qty: 1000 | Refills: 0 | Status: DISCONTINUED | OUTPATIENT
Start: 2024-07-01 | End: 2024-07-01

## 2024-07-01 RX ORDER — LATANOPROST 0.05 MG/ML
1 SOLUTION/ DROPS OPHTHALMIC; TOPICAL
Refills: 0 | DISCHARGE

## 2024-07-01 RX ORDER — AMIODARONE HYDROCHLORIDE 50 MG/ML
1 INJECTION, SOLUTION INTRAVENOUS
Qty: 450 | Refills: 0 | Status: DISCONTINUED | OUTPATIENT
Start: 2024-07-01 | End: 2024-07-02

## 2024-07-01 RX ORDER — ATORVASTATIN CALCIUM 20 MG/1
40 TABLET, FILM COATED ORAL AT BEDTIME
Refills: 0 | Status: DISCONTINUED | OUTPATIENT
Start: 2024-07-01 | End: 2024-07-04

## 2024-07-01 RX ORDER — BUMETANIDE 0.25 MG/ML
1 INJECTION INTRAMUSCULAR; INTRAVENOUS
Qty: 20 | Refills: 0 | Status: DISCONTINUED | OUTPATIENT
Start: 2024-07-01 | End: 2024-07-02

## 2024-07-01 RX ADMIN — Medication 12.5 MICROGRAM(S)/KG/MIN: at 16:26

## 2024-07-01 RX ADMIN — POTASSIUM CHLORIDE 100 MILLIEQUIVALENT(S): 600 TABLET, FILM COATED, EXTENDED RELEASE ORAL at 19:01

## 2024-07-01 RX ADMIN — AMIODARONE HYDROCHLORIDE 33.3 MG/MIN: 50 INJECTION, SOLUTION INTRAVENOUS at 16:45

## 2024-07-01 RX ADMIN — LATANOPROST PF 1 DROP(S): 0.05 SOLUTION/ DROPS OPHTHALMIC at 23:17

## 2024-07-01 RX ADMIN — TAMSULOSIN HYDROCHLORIDE 0.8 MILLIGRAM(S): 0.4 CAPSULE ORAL at 22:01

## 2024-07-01 RX ADMIN — Medication 5 MILLIGRAM(S): at 22:02

## 2024-07-01 RX ADMIN — ATORVASTATIN CALCIUM 40 MILLIGRAM(S): 20 TABLET, FILM COATED ORAL at 22:01

## 2024-07-01 RX ADMIN — POTASSIUM CHLORIDE 100 MILLIEQUIVALENT(S): 600 TABLET, FILM COATED, EXTENDED RELEASE ORAL at 21:22

## 2024-07-01 RX ADMIN — POTASSIUM CHLORIDE 100 MILLIEQUIVALENT(S): 600 TABLET, FILM COATED, EXTENDED RELEASE ORAL at 20:13

## 2024-07-01 RX ADMIN — HEPARIN SODIUM 5000 UNIT(S): 50 INJECTION, SOLUTION INTRAVENOUS at 18:00

## 2024-07-01 RX ADMIN — MIRTAZAPINE 15 MILLIGRAM(S): 15 TABLET, FILM COATED ORAL at 22:02

## 2024-07-01 RX ADMIN — AMIODARONE HYDROCHLORIDE 618 MILLIGRAM(S): 50 INJECTION, SOLUTION INTRAVENOUS at 16:32

## 2024-07-01 RX ADMIN — AMIODARONE HYDROCHLORIDE 150 MILLIGRAM(S): 50 INJECTION, SOLUTION INTRAVENOUS at 16:42

## 2024-07-01 RX ADMIN — BUMETANIDE 132 MILLIGRAM(S): 0.25 INJECTION INTRAMUSCULAR; INTRAVENOUS at 16:50

## 2024-07-01 RX ADMIN — BUMETANIDE 5 MG/HR: 0.25 INJECTION INTRAMUSCULAR; INTRAVENOUS at 17:40

## 2024-07-01 RX ADMIN — AMIODARONE HYDROCHLORIDE 16.7 MG/MIN: 50 INJECTION, SOLUTION INTRAVENOUS at 22:48

## 2024-07-01 NOTE — ED ADULT TRIAGE NOTE - CHIEF COMPLAINT QUOTE
patient c/o low blood pressure, shortness of breath x2 days. patient called his cardiologist who sent him here. patient has PICC line to L arm with dobutamine drip infusing (as per home health nurse) - per wife this is a 24 hour infusion. past medical history of CHF

## 2024-07-01 NOTE — ED PROVIDER NOTE - NS ED ATTENDING STATEMENT MOD
I have seen and examined this patient and fully participated in the care of this patient as the teaching attending.  The service was shared with the TUSHAR.  I reviewed and verified the documentation.

## 2024-07-01 NOTE — H&P ADULT - NSHPLABSRESULTS_GEN_ALL_CORE
< from: Xray Chest 1 View-PORTABLE IMMEDIATE (Xray Chest 1 View-PORTABLE IMMEDIATE .) (07.01.24 @ 15:46) >    Small right pleural effusion.  Stable left upper lung nodule.    < end of copied text >    < from: TTE Echo Complete w/o Contrast w/ Doppler (06.06.24 @ 08:29) >    1. Biatrial enlargement   2. Left ventricular ejection fraction, by visual estimation, is <20%.   3. Severely decreased global left ventricular systolic function.   4. Entire septum, entire apex, and mid anterior segment are abnormal as   described above.   5. Increased LV wall thickness.   6. Mildly increased left ventricular internal cavity size.   7. Right ventricular volume and pressure overload.   8. There is mild concentric left ventricular hypertrophy.   9. Severely enlarged right ventricle.  10. Severely reduced RV systolic function.  11. Moderate mitral valve regurgitation.  12. Moderate-severe tricuspid regurgitation.  13. Mild aortic regurgitation.  14. Mild pulmonic valve regurgitation.  15. Estimated pulmonary artery systolic pressure is 44.6 mmHg assuming a   right atrial pressure of 15 mmHg, which is consistent with mild pulmonary   hypertension.  16. LA volume Index is 38.5 ml/m² ml/m2.    < end of copied text >

## 2024-07-01 NOTE — H&P ADULT - ASSESSMENT
79 year old male known to the [facility] HF clinic (Followed by Dr. Shell), with PMH of CAD with STEMI 5/20222 with cardiogenic shock and IABP placement,  but was not revascularized due to non viable myocardium, ICM (TTE 6/6/24 LVEF 20%, RV systolic dysfunction, moderate MR/TR), LV thrombus, prior lung adenocarcinoma with RUL VATS in 11/2022 s/p radiation therapy comes in with complaints of SOB, LE and hypotension. Of note- patient has been seen at [facility] 5 x this year for SOB, BRODY and was placed on Dobutamine gtt home infusion. Per patient's wife he was unable to take his diuretics due to hypotension at home for which in hopes of raising BP he was given a salt load with bullions. He continued to worsen, and patient's wife called [facility] HF clinic and he was advised to come to ED for immediate care. In ED patient was found in decompensated HF/ cardiogenic shock, tachypneic tachycardic ( SVT) 130s , hypotensive with SBP 70-90s, O2 sat 70s, and was placed on BIPAP and b/l LE 3+ edema/ cool. Started on Amiodarone drip. Dobutamine was briefly hel for tachycardia but restarted. Patient admitted to CCU.    Neuro  # bipolar disorder/depression/anxiety   - AxOX3  - Lethargic  - Prozac every 3 day     Resp  # WOB/ hypoxemia  - iso volume overload  -c/w bipap 14/5/, Fo2 50%      # history of lung cancer s/p RUL lobectomy (9/2022) with recurrent disease s/p radiation to left lung (completed 3/2023),   Stage IV lung carcinoma, s/p local RT, never on systemic chemotherapy  - continue conservative management      Cardiac  #Cardiogenic shock/VOL  -h/o  chronic systolic congestive heart failure  -Appears to be in cardiogenic shock with massive volume overload- hypotensive with SBP 70-90s, tachycardic, tachypnic, BRODY. Elevated Lactate 3.4. proBNP 58406   - Cxr shows Small right pleural effusion.Stable left upper lung nodule.  -increase Dobutamine gtt 5 mcg/kg/min.   - on Bumex 2mg bid with metolazone 2.5mg every other day at home   -Start Bumex 4 mg IVPB x 1, followed by Bumex gtt 1 mg/hr.  s/p RIJ cordis placed on 7/1  -Keep K 4.0-5.0. and Mag >2.0.   -Strict I/O:    # coronary artery disease   - [facility] on 5/2022 showed RCA  and significant LAD and diagonal lesions-but was not revascularized due to non viable myocardium  - c/w Aspirin an Plavix and Lipitor    GI  - no active        # BRODY on CKD  Baseline creatinine: 2   Scr today 3  BRODY likely due  decompensated HF  Trend BUN/Cr.  Strict I/O   Hold ACE-I in setting of BRODY . Avoid Nephrotoxic Meds/ Agents such as (NSAIDs, IV contrast, Aminoglycosides such as gentamicin, -Gadolinium contrast, Phosphate containing enemas, etc..)  -Adjust Medications according to eGFR    Hyperkalemia  -K + 2.8  - s/p KCL order          # BPH  - c/w Flomax    DVT ppx  - heparin SQ                        79 year old male known to the [facility] HF clinic (Followed by Dr. Shell), with PMH of CAD with STEMI 5/20222 with cardiogenic shock and IABP placement,  but was not revascularized due to non viable myocardium, ICM (TTE 6/6/24 LVEF 20%, RV systolic dysfunction, moderate MR/TR), LV thrombus, prior lung adenocarcinoma with RUL VATS in 11/2022 s/p radiation therapy comes in with complaints of SOB, LE and hypotension. Of note- patient has been seen at [facility] 5 x this year for SOB, BRODY and was placed on Dobutamine gtt home infusion. Per patient's wife he was unable to take his diuretics due to hypotension at home for which in hopes of raising BP he was given a salt load with bullions. He continued to worsen, and patient's wife called [facility] HF clinic and he was advised to come to ED for immediate care. In ED patient was found in decompensated HF/ cardiogenic shock, tachypneic tachycardic ( SVT) 130s , hypotensive with SBP 70-90s, O2 sat 70s, and was placed on BIPAP and b/l LE 3+ edema/ cool. Started on Amiodarone drip. Dobutamine was briefly hel for tachycardia but restarted. Patient admitted to CCU.    Neuro  # bipolar disorder/depression/anxiety   - AxOX3  - Lethargic  - Prozac every 3 day     Resp  # WOB/ hypoxemia  - iso volume overload  -c/w bipap 14/5/, Fo2 50%      # history of lung cancer s/p RUL lobectomy (9/2022) with recurrent disease s/p radiation to left lung (completed 3/2023),   -Stage IV lung carcinoma, s/p local RT, never on systemic chemotherapy  - continue conservative management      Cardiac  #Cardiogenic shock/VOL  -h/o  chronic systolic congestive heart failure  -Appears to be in cardiogenic shock with massive volume overload- hypotensive with SBP 70-90s, tachycardic, tachypnic, BRODY. Elevated Lactate 3.4. proBNP 94037   - Cxr shows Small right pleural effusion.Stable left upper lung nodule.  -increase Dobutamine gtt 5 mcg/kg/min.   - on Bumex 2mg bid with metolazone 2.5mg every other day at home   -Start Bumex 4 mg IVPB x 1, followed by Bumex gtt 1 mg/hr.  s/p RIJ cordis placed on 7/1  -Keep K 4.0-5.0. and Mag >2.0.   -Strict I/O:    # coronary artery disease   - [facility] on 5/2022 showed RCA  and significant LAD and diagonal lesions-but was not revascularized due to non viable myocardium  - c/w Aspirin an Plavix and Lipitor    GI  - no active        # BRODY on CKD  Baseline creatinine: 2   Scr today 3  BRODY likely due  decompensated HF  Trend BUN/Cr.  Strict I/O   Hold ACE-I in setting of BRODY . Avoid Nephrotoxic Meds/ Agents such as (NSAIDs, IV contrast, Aminoglycosides such as gentamicin, -Gadolinium contrast, Phosphate containing enemas, etc..)  -Adjust Medications according to eGFR    Hyperkalemia  -K + 2.8  - s/p KCL order          # BPH  - c/w Flomax    DVT ppx  - heparin SQ

## 2024-07-01 NOTE — PATIENT PROFILE ADULT - DO YOU EVER NEED HELP READING HOSPITAL MATERIALS?
Consent: The patient's consent was obtained including but not limited to risks of crusting, scabbing, blistering, scarring, darker or lighter pigmentary change, recurrence, incomplete removal and infection. Show Aperture Variable?: Yes Detail Level: Detailed Render Post-Care Instructions In Note?: no Duration Of Freeze Thaw-Cycle (Seconds): 7 Post-Care Instructions: I reviewed with the patient in detail post-care instructions. Patient is to wear sunprotection, and avoid picking at any of the treated lesions. Pt may apply Vaseline to crusted or scabbing areas. no

## 2024-07-01 NOTE — ED PROVIDER NOTE - OBJECTIVE STATEMENT
79M PMH CAD, combined systolic and diastolic HF with EF 25-30% (Echo 4/2024), valvular disease), CKD, BPH, anxiety/depression/bipolar disorder, h/o lung cancer s/p RUL lobectomy (9/2022) with recurrent disease s/p radiation to left lung (completed 3/2023), recent MICU admission for heart failure with reduced EF <20%, on dobutamine drip, DNR/DNI, presenting to ED w/ HOTN, increasing SOB and b/l lower extremity edema. Pt states he was HOTNsive systolic 70s in which his cardiologist was called recommending to go to the ER. Pt tachypneic, abdominal breathing and placed on BIPAP for respiratory support. Pt denies fevers, cough, sore throat, abdominal pain, n/v/d/c, fevers.

## 2024-07-01 NOTE — PROVIDER CONTACT NOTE (OTHER) - SITUATION
Patient adamantly refusing to wear his BiPAP machine. Educated patient on the importance and benefits of wearing the mask but still refusing.

## 2024-07-01 NOTE — H&P ADULT - HISTORY OF PRESENT ILLNESS
79 year old male known to the [facility] HF clinic (Followed by Dr. Shell), with PMH of CAD with STEMI 5/20222 with cardiogenic shock and IABP placement,  but was not revascularized due to non viable myocardium, ICM (TTE 6/6/24 LVEF 20%, RV systolic dysfunction, moderate MR/TR), LV thrombus, prior lung adenocarcinoma with RUL VATS in 11/2022 s/p radiation therapy comes in with complaints of SOB, LE and hypotension. Of note- patient has been seen at [facility] 5 x this year for SOB, BRODY and was placed on Dobutamine gtt home infusion. Per patient's wife he was unable to take his diuretics due to hypotension at home for which in hopes of raising BP he was given a salt load with bullions. He continued to worsen, and patient's wife called [facility] HF clinic and he was advised to come to ED for immediate care. In ED patient is tachypenic, tachycardic with BP 60-130s (SVT), hypotensive with SBP 70-90s, O2 sat 70s, and was placed on BIPAP. Home Dobutamine gtt was held in ED due to SVT,  Started on amiodarone drip and  restarted on Dobutamine drip . Patient admits to SOB at rest, worsening LE edema and weakness. Labs show H/H 11.5/33.5, BRODY BUN 97/3.09, Na 134, Cl 92, Alk phos 425, AST 75, ALT 36, pro BNP 37089, lactate 3.4. Patient was seen by heart failure team and received bumex 4mg IV x1 followed by Bumex drip. RIJ cordis was placed. Patient was admitted for acute on chronic heart failure with hypotension/ volume overload.

## 2024-07-01 NOTE — PATIENT PROFILE ADULT - FUNCTIONAL ASSESSMENT - BASIC MOBILITY 6.
3-calculated by average/Not able to assess (calculate score using [facility] averaging method)

## 2024-07-01 NOTE — ED ADULT NURSE REASSESSMENT NOTE - NS ED NURSE REASSESS COMMENT FT1
FLOAT RN: Pt A&Ox4, respirations equal and unlabored. Pt on BiPAP, tolerating well, O2 sat 99%. Pt home Dobutamine infusing stopped and started on Dobutamine infusion as per EMR orders. Pt heart rate labile, between 80's and 130's. MAP >65. Additional 20G IV placed to R AC and R wrist. Bilateral lower extremities with +3 pitted edema. No urine output at this time. Pt offers no complaints. Pt admitted to CCU. CCU MD and NP at bedside. Pending bed assignment. No acute distress noted. Safety maintained.

## 2024-07-01 NOTE — H&P ADULT - NSHPREVIEWOFSYSTEMS_GEN_ALL_CORE
REVIEW OF SYSTEMS    General: + fatigue/malaise,+  weight gain.  Skin: no rashes  Ophthalmologic: no blurred vision, no loss of vision. 	  ENT: no sore throat, rhinorrhea, sinus congestion.  Cardiovascular: no chest pain ,no palpitation, no dizziness, no diaphoresis, no edema  Respiratory: + SOB, cough or wheeze.  Gastrointestinal:  no N/V/D, no melena  Genitourinary: no dysuria/hesitancy or hematuria.  Musculoskeletal: no myalgias or arthralgias, b/l LE edema  Neurological: no changes in vision or hearing, no lightheadedness/dizziness, no syncope/near syncope	  Psychiatric: no unusual stress/anxiety REVIEW OF SYSTEMS    General: + fatigue/malaise,+  weight gain.  Skin: no rashes  Ophthalmologic: no blurred vision, no loss of vision. 	  ENT: no sore throat, rhinorrhea, sinus congestion.  Cardiovascular: no chest pain ,no palpitation, no dizziness, no diaphoresis, + edema  Respiratory: + SOB, cough or wheeze.  Gastrointestinal:  no N/V/D, no melena  Genitourinary: no dysuria/hesitancy or hematuria.  Musculoskeletal: no myalgias or arthralgias, b/l LE edema  Neurological: no changes in vision or hearing, no lightheadedness/dizziness, no syncope/near syncope	  Psychiatric: no unusual stress/anxiety

## 2024-07-01 NOTE — ED PROVIDER NOTE - PHYSICAL EXAMINATION
CONSTITUTIONAL: Unwell but nontoxic appearing.   HEAD: Normocephalic, atraumatic;  EYES:  conjunctiva and sclera WNL;  NECK/LYMPH: Supple  CARD: Normal S1, S2; no murmurs, rubs, or gallops noted  RESP: Pt tachypneic appearing. abdominal breathing present. Normal chest excursion with respiration; breath sounds clear and equal bilaterally; no wheezes, rhonchi, or rales noted  ABD/GI: soft, non-distended; non-tender; no palpable organomegaly, no pulsatile mass  EXT/MS: moves all extremities; distal pulses are normal. + 3+ B/L pitting edema.   SKIN: Normal for age and race; warm; dry; good turgor; no apparent lesions or exudate noted  NEURO: Awake, alert, oriented x 3, no gross deficits, CN II-XII grossly intact, no motor or sensory deficit noted  PSYCH: Normal mood; appropriate affect

## 2024-07-01 NOTE — H&P ADULT - NSHPPHYSICALEXAM_GEN_ALL_CORE
Appearance: frail elderly male 	  HEENT:   Normal oral mucosa, PERRL, EOMI	  Lymphatic: No lymphadenopathy  Cardiovascular: Normal S1 S2, JVP difficult to assess, likely severely elevated, No murmurs, 3+ b/l LE edema and  cool    Respiratory: crackles b/l tachypneic tripoding   Psychiatry: A & O x 3, Mood & affect appropriate  Gastrointestinal:  Soft, Non-tender, + BS	  Skin: No rashes, No ecchymoses, No cyanosis	  Neurologic: Non-focal  Extremities: Normal range of motion, No clubbing, cyanosis   Vascular: Peripheral pulses palpable 2+ bilaterally Appearance: frail elderly male 	  HEENT:   Normal oral mucosa, PERRL, EOMI	  Lymphatic: No lymphadenopathy  Cardiovascular: Normal S1 S2, JVP difficult to assess, likely severely elevated, No murmurs, 3+ b/l LE edema  extended to lower abdomen, scrotum, b/l thigh and b/l legs  cool  to touch  Respiratory: crackles b/l tachypneic tripoding   Psychiatry: A & O x 3, Mood & affect appropriate  Gastrointestinal:  firm, distended, Non-tender, + BS	  Skin: No rashes, No ecchymoses, No cyanosis	  Neurologic: Non-focal  Extremities: Normal range of motion, No clubbing, cyanosis   Vascular: Peripheral pulses palpable 2+ bilaterally

## 2024-07-01 NOTE — H&P ADULT - PATIENT'S SEXUAL ORIENTATION
EMERGENCY DEPARTMENT HISTORY AND PHYSICAL EXAM      Date: 6/7/2021  Patient Name: Carlos Durham    History of Presenting Illness     Chief Complaint   Patient presents with    Urinary Frequency       History Provided By: Patient    HPI: Carlos Durham, 54 y.o. female presents to the ED with a one week h/o urinary frequency. and vaginal irritation. Denies dysuria, hematuria, abdominal pain, or vaginal discharge. Does report starting new soap about a week ago. Also c/o vaginal dryness. Denies fevers or chills. No nausea or vomiting. There are no other complaints, changes, or physical findings at this time. PCP: Kirt Shvaer MD    No current facility-administered medications on file prior to encounter. Current Outpatient Medications on File Prior to Encounter   Medication Sig Dispense Refill    losartan-hydroCHLOROthiazide (HYZAAR) 100-25 mg per tablet Take 1 Tablet by mouth daily.  hydroCHLOROthiazide (HYDRODIURIL) 25 mg tablet Take 25 mg by mouth daily. (Patient not taking: Reported on 6/7/2021)      phentermine 30 mg capsule Take 1 Cap by mouth daily.  baclofen (LIORESAL) 10 mg tablet Take 1 Tab by mouth three (3) times daily. 15 Tab 0    naproxen sodium (ANAPROX) 275 mg tablet Take 1 Tab by mouth two (2) times daily (with meals). 20 Tab 0       Past History     Past Medical History:  Past Medical History:   Diagnosis Date    Hypertension        Past Surgical History:  Past Surgical History:   Procedure Laterality Date    HX GYN         Family History:  No family history on file. Social History:  Social History     Tobacco Use    Smoking status: Never Smoker    Smokeless tobacco: Never Used   Substance Use Topics    Alcohol use: Never    Drug use: Never       Allergies:  No Known Allergies      Review of Systems     Review of Systems   Constitutional: Negative for chills and fever. HENT: Negative for congestion and sore throat. Eyes: Negative for pain and redness. Respiratory: Negative for cough and shortness of breath. Cardiovascular: Negative for chest pain and leg swelling. Gastrointestinal: Negative for abdominal pain, diarrhea, nausea and vomiting. Endocrine: Negative for cold intolerance and heat intolerance. Genitourinary: Positive for frequency. Negative for dysuria, hematuria, urgency and vaginal discharge. Vaginal dryness   Musculoskeletal: Negative for arthralgias and myalgias. Skin: Negative for pallor and rash. Neurological: Negative for dizziness, numbness and headaches. Psychiatric/Behavioral: Negative for agitation and dysphoric mood. Physical Exam     Physical Exam  Vitals and nursing note reviewed. Exam conducted with a chaperone present. Constitutional:       General: She is not in acute distress. Appearance: Normal appearance. She is obese. She is not ill-appearing or toxic-appearing. HENT:      Head: Normocephalic and atraumatic. Nose: Nose normal.      Mouth/Throat:      Mouth: Mucous membranes are moist.      Pharynx: Oropharynx is clear. Eyes:      Extraocular Movements: Extraocular movements intact. Conjunctiva/sclera: Conjunctivae normal.      Pupils: Pupils are equal, round, and reactive to light. Cardiovascular:      Rate and Rhythm: Normal rate and regular rhythm. Heart sounds: Normal heart sounds. No murmur heard. No friction rub. No gallop. Pulmonary:      Effort: Pulmonary effort is normal. No respiratory distress. Breath sounds: Normal breath sounds. No stridor. No wheezing, rhonchi or rales. Abdominal:      General: There is no distension. Palpations: Abdomen is soft. Tenderness: There is no abdominal tenderness. Genitourinary:     General: Normal vulva. Comments: Normal appearing female genitalia  No erythema, no swelling, no discharge, no lesions  Musculoskeletal:         General: No swelling or tenderness. Normal range of motion.       Cervical back: Neck supple. Skin:     General: Skin is warm and dry. Findings: No erythema, lesion or rash. Neurological:      General: No focal deficit present. Mental Status: She is alert and oriented to person, place, and time. Psychiatric:         Mood and Affect: Mood normal.         Behavior: Behavior normal.         Diagnostic Study Results     Labs -     Recent Results (from the past 12 hour(s))   URINALYSIS W/ REFLEX CULTURE    Collection Time: 06/07/21 11:00 AM    Specimen: Urine   Result Value Ref Range    Color Yellow/Straw      Appearance Clear Clear      Specific gravity 1.015 1.003 - 1.030      pH (UA) 5.0 5.0 - 8.0      Protein Negative Negative mg/dL    Glucose Negative Negative mg/dL    Ketone Negative Negative mg/dL    Bilirubin Negative Negative      Blood Negative Negative      Urobilinogen 0.1 (L) 0.2 - 1.0 EU/dL    Nitrites Negative Negative      Leukocyte Esterase Small (A) Negative      WBC 0-4 0 - 4 /hpf    RBC 0-5 0 - 5 /hpf    Bacteria Negative Negative /hpf    UA:UC IF INDICATED Culture not indicated by UA result Culture not indicated by UA result         Radiologic Studies -   @lastxrresult@  CT Results  (Last 48 hours)    None        CXR Results  (Last 48 hours)    None            Medical Decision Making   I am the first provider for this patient. I reviewed the vital signs, available nursing notes, past medical history, past surgical history, family history and social history. Vital Signs-Reviewed the patient's vital signs. Patient Vitals for the past 12 hrs:   Temp Pulse Resp BP SpO2   06/07/21 1039 99.1 °F (37.3 °C) 88 15 (!) 161/84 98 %       Records Reviewed: Nursing Notes and Old Medical Records      Provider Notes (Medical Decision Making):   Discussed with patient recommend soap for sensitive skin. Also can talk with PCP about vaginal   Dryness. UA negative for UTI   Follow-up with PCP, return with worsening, new or worrisome symptoms.   [facility]         ED Course:   Initial assessment performed. The patients presenting problems have been discussed, and they are in agreement with the care plan formulated and outlined with them. I have encouraged them to ask questions as they arise throughout their visit. PROCEDURES  Procedures         PLAN:  1. Current Discharge Medication List        2. Follow-up Information     Follow up With Specialties Details Why Contact Info    Chantelle Gregg MD Internal Medicine In 2 days  [address] [facility]  [phone]          Return to ED if worse     Diagnosis     Clinical Impression:   1.  Vaginitis and vulvovaginitis Heterosexual

## 2024-07-01 NOTE — CONSULT NOTE ADULT - SUBJECTIVE AND OBJECTIVE BOX
Date of Admission:    CHIEF COMPLAINT:    HISTORY OF PRESENT ILLNESS:      Allergies    No Known Allergies    Intolerances    	    MEDICATIONS:  aMIOdarone Infusion 1 mG/Min IV Continuous <Continuous>  aMIOdarone IVPB 150 milliGRAM(s) IV Intermittent once  buMETAnide Infusion 1 mG/Hr IV Continuous <Continuous>  buMETAnide IVPB 4 milliGRAM(s) IV Intermittent Once  DOBUTamine Infusion 5 MICROgram(s)/kG/Min IV Continuous <Continuous>                  PAST MEDICAL & SURGICAL HISTORY:  BPH (benign prostatic hyperplasia)      Bipolar disorder      Depression      Anxiety      Umbilical hernia, incarcerated      Constipation      Urinary retention      CAD (coronary artery disease)      SCC (squamous cell carcinoma)      Lung mass      CHF, chronic      Heart failure with reduced ejection fraction      History of arthroscopy of knee  Right, 1987      History of tonsillectomy  1952      History of hernia repair      H/O coronary angiogram          FAMILY HISTORY:  Family history of depression (Mother)    FH: CAD (coronary artery disease) (Sibling, Sibling)    Family history of diabetes mellitus (DM) (Sibling)        SOCIAL HISTORY:    [ ] Non-smoker  [ ] Smoker  [ ] Alcohol      REVIEW OF SYSTEMS:  CONSTITUTIONAL: No fever, weight loss, or fatigue  EYES: No eye pain, visual disturbances, or discharge  ENMT:  No difficulty hearing, tinnitus, vertigo; No sinus or throat pain  NECK: No pain or stiffness  RESPIRATORY: No cough, wheezing, chills or hemoptysis; No Shortness of Breath  CARDIOVASCULAR: No chest pain, palpitations, passing out, dizziness, or leg swelling  GASTROINTESTINAL: No abdominal or epigastric pain. No nausea, vomiting, or hematemesis; No diarrhea or constipation. No melena or hematochezia.  GENITOURINARY: No dysuria, frequency, hematuria, or incontinence  NEUROLOGICAL: No headaches, memory loss, loss of strength, numbness, or tremors  SKIN: No itching, burning, rashes, or lesions   LYMPH Nodes: No enlarged glands  ENDOCRINE: No heat or cold intolerance; No hair loss  MUSCULOSKELETAL: No joint pain or swelling; No muscle, back, or extremity pain  PSYCHIATRIC: No depression, anxiety, mood swings, or difficulty sleeping  HEME/LYMPH: No easy bruising, or bleeding gums  ALLERY AND IMMUNOLOGIC: No hives or eczema	    [ ] All others negative	  [ ] Unable to obtain    PHYSICAL EXAM:  T(C): 36.3 (07-01-24 @ 13:27), Max: 36.3 (07-01-24 @ 13:27)  HR: 130 (07-01-24 @ 15:35) (80 - 138)  BP: 95/66 (07-01-24 @ 15:35) (81/55 - 104/59)  RR: 22 (07-01-24 @ 15:35) (17 - 25)  SpO2: 98% (07-01-24 @ 15:35) (90% - 98%)  Wt(kg): --  I&O's Summary      Appearance: Normal	  HEENT:   Normal oral mucosa, PERRL, EOMI	  Lymphatic: No lymphadenopathy  Cardiovascular: Normal S1 S2, No JVD, No murmurs, No edema  Respiratory: Lungs clear to auscultation	  Psychiatry: A & O x 3, Mood & affect appropriate  Gastrointestinal:  Soft, Non-tender, + BS	  Skin: No rashes, No ecchymoses, No cyanosis	  Neurologic: Non-focal  Extremities: Normal range of motion, No clubbing, cyanosis or edema  Vascular: Peripheral pulses palpable 2+ bilaterally        LABS:	 	    CBC Full  -  ( 01 Jul 2024 15:48 )  WBC Count : 4.64 K/uL  Hemoglobin : 11.5 g/dL  Hematocrit : 33.5 %  Platelet Count - Automated : 161 K/uL  Mean Cell Volume : 87.7 fL  Mean Cell Hemoglobin : 30.1 pg  Mean Cell Hemoglobin Concentration : 34.3 gm/dL  Auto Neutrophil # : x  Auto Lymphocyte # : x  Auto Monocyte # : x  Auto Eosinophil # : x  Auto Basophil # : x  Auto Neutrophil % : x  Auto Lymphocyte % : x  Auto Monocyte % : x  Auto Eosinophil % : x  Auto Basophil % : x    07-01    134<L>  |  92<L>  |  97<H>  ----------------------------<  127<H>  x    |  19<L>  |  3.09<H>    Ca    8.9      01 Jul 2024 15:48  Phos  6.2     07-01  Mg     2.50     07-01    TPro  7.5  /  Alb  3.5  /  TBili  3.2<H>  /  DBili  x   /  AST  75<H>  /  ALT  36  /  AlkPhos  425<H>  07-01      < from: TTE Echo Complete w/o Contrast w/ Doppler (06.06.24 @ 08:29) >  Summary:   1. Biatrial enlargement   2. Left ventricular ejection fraction, by visual estimation, is <20%.   3. Severely decreased global left ventricular systolic function.   4. Entire septum, entire apex, and mid anterior segment are abnormal as   described above.   5. Increased LV wall thickness.   6. Mildly increased left ventricular internal cavity size.   7. Right ventricular volume and pressure overload.   8. There is mild concentric left ventricular hypertrophy.   9. Severely enlarged right ventricle.  10. Severely reduced RV systolic function.  11. Moderate mitral valve regurgitation.  12. Moderate-severe tricuspid regurgitation.  13. Mild aortic regurgitation.  14. Mild pulmonic valve regurgitation.  15. Estimated pulmonary artery systolic pressure is 44.6 mmHg assuming a   right atrial pressure of 15 mmHg, which is consistent with mild pulmonary   hypertension.  16. LA volume Index is 38.5 ml/m² ml/m2.    [id number] Rik Oh , Electronically signed on 6/6/2024   at 1:34:30 PM    < end of copied text >    < from: Cardiac Catheterization (05.11.22 @ 15:16) >    RCA  and significant LAD and diagonal lesions with LILLIANA 3 flow.  IABP placed for elevated right sided pressures.  Consider  PCI after medical management and possible viability       < end of copied text >       Date of Admission: 7/1/24    CHIEF COMPLAINT: SOB     HISTORY OF PRESENT ILLNESS:    This is a 79 year old male known to the [facility] HF clinic (Followed by Dr. Shell), with PMH of CAD with STEMI 5/20222 with cardiogenic shock and IABP placement,  but was not revascularized due to non viable myocardium, ICM (TTE 6/6/24 LVEF 20%, RV systolic dysfunction, moderate MR/TR), LV thrombus, prior lung adenocarcinoma with RUL VATS in 11/2022 s/p radiation therapy comes in with complaints of SOB, LE and hypotension. Of note- patient has been seen at [facility] 5 x this year for SOB, BRODY and was placed on Dobutamine gtt home infusion. Per patient's wife he was unable to take his diuretics due to hypotension at home for which in hopes of raising BP he was given a salt load with bullions. He continued to worsen, and patient's wife called [facility] HF clinic and he was advised to come to ED for immediate care. In ED patient is tachypenic, tachycardic with BP 60-130s (SVT), hypotensive with SBP 70-90s, O2 sat 70s, and was placed on BIPAP. Home Dobutamine gtt was held in ED due to SVT, but was restarted s/p HF consultation. Patient admits to SOB at rest, worsening LE edema and weakness. Labs show H/h11.5/33.5, BRODY BUN 97/3.09, na 134, Cl 92, Alk phos 425, AST 75, ALT 36, proBNP 47707, lactate 3.4.         Allergies    No Known Allergies    Intolerances    	    MEDICATIONS:  aMIOdarone Infusion 1 mG/Min IV Continuous <Continuous>  aMIOdarone IVPB 150 milliGRAM(s) IV Intermittent once  buMETAnide Infusion 1 mG/Hr IV Continuous <Continuous>  buMETAnide IVPB 4 milliGRAM(s) IV Intermittent Once  DOBUTamine Infusion 5 MICROgram(s)/kG/Min IV Continuous <Continuous>                  PAST MEDICAL & SURGICAL HISTORY:  BPH (benign prostatic hyperplasia)      Bipolar disorder      Depression      Anxiety      Umbilical hernia, incarcerated      Constipation      Urinary retention      CAD (coronary artery disease)      SCC (squamous cell carcinoma)      Lung mass      CHF, chronic      Heart failure with reduced ejection fraction      History of arthroscopy of knee  Right, 1987      History of tonsillectomy  1952      History of hernia repair      H/O coronary angiogram          FAMILY HISTORY:  Family history of depression (Mother)    FH: CAD (coronary artery disease) (Sibling, Sibling)    Family history of diabetes mellitus (DM) (Sibling)        SOCIAL HISTORY:    [ ] Non-smoker  [ ] Smoker  [ ] Alcohol      REVIEW OF SYSTEMS:  CONSTITUTIONAL: admits to fatigue   EYES: No eye pain, visual disturbances, or discharge  ENMT:  No difficulty hearing, tinnitus, vertigo; No sinus or throat pain  NECK: No pain or stiffness  RESPIRATORY: No cough, wheezing, chills or hemoptysis; admits to Shortness of Breath  CARDIOVASCULAR: No chest pain, palpitations, passing out, dizziness, admits to leg swelling  GASTROINTESTINAL: No abdominal or epigastric pain. No nausea, vomiting, or hematemesis; No diarrhea or constipation. No melena or hematochezia.  GENITOURINARY: No dysuria, frequency, hematuria, or incontinence  NEUROLOGICAL: No headaches, memory loss, loss of strength, numbness, or tremors  SKIN: No itching, burning, rashes, or lesions   LYMPH Nodes: No enlarged glands  ENDOCRINE: No heat or cold intolerance; No hair loss  MUSCULOSKELETAL: No joint pain or swelling; No muscle, back, or extremity pain  PSYCHIATRIC: No depression, anxiety, mood swings, or difficulty sleeping  HEME/LYMPH: No easy bruising, or bleeding gums  ALLERY AND IMMUNOLOGIC: No hives or eczema	    [ ] All others negative	  [ ] Unable to obtain    PHYSICAL EXAM:  T(C): 36.3 (07-01-24 @ 13:27), Max: 36.3 (07-01-24 @ 13:27)  HR: 130 (07-01-24 @ 15:35) (80 - 138)  BP: 95/66 (07-01-24 @ 15:35) (81/55 - 104/59)  RR: 22 (07-01-24 @ 15:35) (17 - 25)  SpO2: 98% (07-01-24 @ 15:35) (90% - 98%)  Wt(kg): --  I&O's Summary      Appearance: frail elderly male 	  HEENT:   Normal oral mucosa, PERRL, EOMI	  Lymphatic: No lymphadenopathy  Cardiovascular: Normal S1 S2, JVP difficult to assess, likely severely elevated, No murmurs, 3+ b/l LE edema and is cool b/l.   Respiratory: crackles b/l tachypnic tripoding   Psychiatry: A & O x 3, Mood & affect appropriate  Gastrointestinal:  Soft, Non-tender, + BS	  Skin: No rashes, No ecchymoses, No cyanosis	  Neurologic: Non-focal  Extremities: Normal range of motion, No clubbing, cyanosis   Vascular: Peripheral pulses palpable 2+ bilaterally        LABS:	 	    CBC Full  -  ( 01 Jul 2024 15:48 )  WBC Count : 4.64 K/uL  Hemoglobin : 11.5 g/dL  Hematocrit : 33.5 %  Platelet Count - Automated : 161 K/uL  Mean Cell Volume : 87.7 fL  Mean Cell Hemoglobin : 30.1 pg  Mean Cell Hemoglobin Concentration : 34.3 gm/dL  Auto Neutrophil # : x  Auto Lymphocyte # : x  Auto Monocyte # : x  Auto Eosinophil # : x  Auto Basophil # : x  Auto Neutrophil % : x  Auto Lymphocyte % : x  Auto Monocyte % : x  Auto Eosinophil % : x  Auto Basophil % : x    07-01    134<L>  |  92<L>  |  97<H>  ----------------------------<  127<H>  x    |  19<L>  |  3.09<H>    Ca    8.9      01 Jul 2024 15:48  Phos  6.2     07-01  Mg     2.50     07-01    TPro  7.5  /  Alb  3.5  /  TBili  3.2<H>  /  DBili  x   /  AST  75<H>  /  ALT  36  /  AlkPhos  425<H>  07-01      < from: TTE Echo Complete w/o Contrast w/ Doppler (06.06.24 @ 08:29) >  Summary:   1. Biatrial enlargement   2. Left ventricular ejection fraction, by visual estimation, is <20%.   3. Severely decreased global left ventricular systolic function.   4. Entire septum, entire apex, and mid anterior segment are abnormal as   described above.   5. Increased LV wall thickness.   6. Mildly increased left ventricular internal cavity size.   7. Right ventricular volume and pressure overload.   8. There is mild concentric left ventricular hypertrophy.   9. Severely enlarged right ventricle.  10. Severely reduced RV systolic function.  11. Moderate mitral valve regurgitation.  12. Moderate-severe tricuspid regurgitation.  13. Mild aortic regurgitation.  14. Mild pulmonic valve regurgitation.  15. Estimated pulmonary artery systolic pressure is 44.6 mmHg assuming a   right atrial pressure of 15 mmHg, which is consistent with mild pulmonary   hypertension.  16. LA volume Index is 38.5 ml/m² ml/m2.    [id number] Rik Oh , Electronically signed on 6/6/2024   at 1:34:30 PM    < end of copied text >    < from: Cardiac Catheterization (05.11.22 @ 15:16) >    RCA  and significant LAD and diagonal lesions with LILLIANA 3 flow.  IABP placed for elevated right sided pressures.  Consider  PCI after medical management and possible viability       < end of copied text >       Date of Admission: 7/1/24    CHIEF COMPLAINT: SOB     HISTORY OF PRESENT ILLNESS:    This is a 79 year old male known to the [facility] HF clinic (Followed by Dr. Shell), with PMH of CAD with STEMI 5/20222 with cardiogenic shock and IABP placement,  but was not revascularized due to non viable myocardium, ICM (TTE 6/6/24 LVEF 20%, RV systolic dysfunction, moderate MR/TR), LV thrombus, prior lung adenocarcinoma with RUL VATS in 11/2022 s/p radiation therapy comes in with complaints of SOB, LE and hypotension. Of note- patient has been seen at [facility] 5 x this year for SOB, BRODY and was placed on Dobutamine gtt home infusion. Per patient's wife he was unable to take his diuretics due to hypotension at home for which in hopes of raising BP he was given a salt load with bullions. He continued to worsen, and patient's wife called [facility] HF clinic and he was advised to come to ED for immediate care. In ED patient is tachypenic, tachycardic with BP 60-130s (SVT), hypotensive with SBP 70-90s, O2 sat 70s, and was placed on BIPAP. Home Dobutamine gtt was held in ED due to SVT, but was restarted s/p HF consultation. Patient admits to SOB at rest, worsening LE edema and weakness. Labs show H/h11.5/33.5, BRODY BUN 97/3.09, na 134, Cl 92, Alk phos 425, AST 75, ALT 36, proBNP 48048, lactate 3.4.         Allergies    No Known Allergies    Intolerances    	    MEDICATIONS:  aMIOdarone Infusion 1 mG/Min IV Continuous <Continuous>  aMIOdarone IVPB 150 milliGRAM(s) IV Intermittent once  buMETAnide Infusion 1 mG/Hr IV Continuous <Continuous>  buMETAnide IVPB 4 milliGRAM(s) IV Intermittent Once  DOBUTamine Infusion 5 MICROgram(s)/kG/Min IV Continuous <Continuous>                  PAST MEDICAL & SURGICAL HISTORY:  BPH (benign prostatic hyperplasia)      Bipolar disorder      Depression      Anxiety      Umbilical hernia, incarcerated      Constipation      Urinary retention      CAD (coronary artery disease)      SCC (squamous cell carcinoma)      Lung mass      CHF, chronic      Heart failure with reduced ejection fraction      History of arthroscopy of knee  Right, 1987      History of tonsillectomy  1952      History of hernia repair      H/O coronary angiogram          FAMILY HISTORY:  Family history of depression (Mother)    FH: CAD (coronary artery disease) (Sibling, Sibling)    Family history of diabetes mellitus (DM) (Sibling)        SOCIAL HISTORY:    [ ] Non-smoker  [ ] Smoker  [ ] Alcohol      REVIEW OF SYSTEMS:  CONSTITUTIONAL: admits to fatigue   EYES: No eye pain, visual disturbances, or discharge  ENMT:  No difficulty hearing, tinnitus, vertigo; No sinus or throat pain  NECK: No pain or stiffness  RESPIRATORY: No cough, wheezing, chills or hemoptysis; admits to Shortness of Breath  CARDIOVASCULAR: No chest pain, palpitations, passing out, dizziness, admits to leg swelling  GASTROINTESTINAL: No abdominal or epigastric pain. No nausea, vomiting, or hematemesis; No diarrhea or constipation. No melena or hematochezia.  GENITOURINARY: No dysuria, frequency, hematuria, or incontinence  NEUROLOGICAL: No headaches, memory loss, loss of strength, numbness, or tremors  SKIN: No itching, burning, rashes, or lesions   LYMPH Nodes: No enlarged glands  ENDOCRINE: No heat or cold intolerance; No hair loss  MUSCULOSKELETAL: No joint pain or swelling; No muscle, back, or extremity pain  PSYCHIATRIC: No depression, anxiety, mood swings, or difficulty sleeping  HEME/LYMPH: No easy bruising, or bleeding gums  ALLERY AND IMMUNOLOGIC: No hives or eczema	    [ ] All others negative	  [ ] Unable to obtain    PHYSICAL EXAM:  T(C): 36.3 (07-01-24 @ 13:27), Max: 36.3 (07-01-24 @ 13:27)  HR: 130 (07-01-24 @ 15:35) (80 - 138)  BP: 95/66 (07-01-24 @ 15:35) (81/55 - 104/59)  RR: 22 (07-01-24 @ 15:35) (17 - 25)  SpO2: 98% (07-01-24 @ 15:35) (90% - 98%)  Wt(kg): --  I&O's Summary      Appearance: frail elderly male 	  HEENT:   Normal oral mucosa, PERRL, EOMI	  Lymphatic: No lymphadenopathy  Cardiovascular: Normal S1 S2, JVP difficult to assess, likely severely elevated, No murmurs, 3+ b/l LE edema and is cool b/l.   Respiratory: crackles b/l tachypnic tripoding   Psychiatry: A & O x 3, Mood & affect appropriate  Gastrointestinal:  Soft, Non-tender, + BS	  Skin: No rashes, No ecchymoses, No cyanosis	  Neurologic: Non-focal  Extremities: Normal range of motion, No clubbing, cyanosis   Vascular: Peripheral pulses palpable 2+ bilaterally        LABS:	 	    CBC Full  -  ( 01 Jul 2024 15:48 )  WBC Count : 4.64 K/uL  Hemoglobin : 11.5 g/dL  Hematocrit : 33.5 %  Platelet Count - Automated : 161 K/uL  Mean Cell Volume : 87.7 fL  Mean Cell Hemoglobin : 30.1 pg  Mean Cell Hemoglobin Concentration : 34.3 gm/dL  Auto Neutrophil # : x  Auto Lymphocyte # : x  Auto Monocyte # : x  Auto Eosinophil # : x  Auto Basophil # : x  Auto Neutrophil % : x  Auto Lymphocyte % : x  Auto Monocyte % : x  Auto Eosinophil % : x  Auto Basophil % : x    07-01    134<L>  |  92<L>  |  97<H>  ----------------------------<  127<H>  x    |  19<L>  |  3.09<H>    Ca    8.9      01 Jul 2024 15:48  Phos  6.2     07-01  Mg     2.50     07-01    TPro  7.5  /  Alb  3.5  /  TBili  3.2<H>  /  DBili  x   /  AST  75<H>  /  ALT  36  /  AlkPhos  425<H>  07-01      < from: TTE Echo Complete w/o Contrast w/ Doppler (06.06.24 @ 08:29) >  Summary:   1. Biatrial enlargement   2. Left ventricular ejection fraction, by visual estimation, is <20%.   3. Severely decreased global left ventricular systolic function.   4. Entire septum, entire apex, and mid anterior segment are abnormal as   described above.   5. Increased LV wall thickness.   6. Mildly increased left ventricular internal cavity size.   7. Right ventricular volume and pressure overload.   8. There is mild concentric left ventricular hypertrophy.   9. Severely enlarged right ventricle.  10. Severely reduced RV systolic function.  11. Moderate mitral valve regurgitation.  12. Moderate-severe tricuspid regurgitation.  13. Mild aortic regurgitation.  14. Mild pulmonic valve regurgitation.  15. Estimated pulmonary artery systolic pressure is 44.6 mmHg assuming a   right atrial pressure of 15 mmHg, which is consistent with mild pulmonary   hypertension.  16. LA volume Index is 38.5 ml/m² ml/m2.    [id number] Rik Oh , Electronically signed on 6/6/2024   at 1:34:30 PM    < end of copied text >    < from: Cardiac Catheterization (05.11.22 @ 15:16) >    RCA  and significant LAD and diagonal lesions with LILLIANA 3 flow.  IABP placed for elevated right sided pressures.  Consider  PCI after medical management and possible viability       < end of copied text >

## 2024-07-01 NOTE — ED PROVIDER NOTE - ATTENDING CONTRIBUTION TO CARE
Patient chronically il appearing NAD HEENT nml heart s1s2, lungs clear, abd soft no hepatosplenomegaly, no JVD, intermittent , SVT  with same BP. converts to sinus on own.   Discussion with wife- DNR- may use any meds but no compresions or intubations,.

## 2024-07-01 NOTE — CONSULT NOTE ADULT - CRITICAL CARE ATTENDING COMMENT
Mr. Becerra is a 79-year-old Male with h/o bipolar disorder, CAD/STEMI (2022) medically managed, stage D HFrEF/ICM (EF 15-20%, LVEDD 5.6 cm; on  3 mcg/kg/min since ) w/o ICD, CKD (b/l Cr 1.5-2), BPH, anxiety/depression/bipolar disorder, h/o lung cancer s/p RUL lobectomy (2022) with recurrent disease s/p radiation to left lung (completed 3/2023) w/ possible recurrence (?), multiple admissions for ADHF (at [place]) presented from home for persistently low BP and weight gain. Of note, had been hospitalized at  from - for decompensated heart failure found to be inotrope dependent. Had reportedly refused hospice and was discharged on dobutamine. Had persistnetly low BP at home and was being given diuretics/metolazone but due to BP of 70-80s wife was concerned about giving him diuretics. Had progressive orthopnea/PND. On arrival, BP 90/50, placed on Bipap. Had paroxysms of SVT to 130s (hemodynamically tolerated). On exam, chronically ill appearing, JvP severely elevated, RRR, dec BS at bases, nontender abdomen, b/l pitting edema to sacrum, cool to palpation. Labs reviewed - Hb 11.5, BUN/Cr 97/3.04 (40/2.05), Mr. Becerra is a 79-year-old Male with h/o bipolar disorder, CAD/STEMI (2022) medically managed, stage D HFrEF/ICM (EF 15-20%, LVEDD 5.6 cm; on  3 mcg/kg/min since ) w/o ICD, CKD (b/l Cr 1.5-2), BPH, anxiety/depression/bipolar disorder, h/o lung cancer s/p RUL lobectomy (2022) with recurrent disease s/p radiation to left lung (completed 3/2023) w/ possible recurrence (?), multiple admissions for ADHF (at [place]) presented from home for persistently low BP and weight gain. Of note, had been hospitalized at  from - for decompensated heart failure found to be inotrope dependent. Had reportedly refused hospice and was discharged on dobutamine. Had persistnetly low BP at home and was being given diuretics/metolazone but due to BP of 70-80s wife was concerned about giving him diuretics. Had progressive orthopnea/PND. On arrival, BP 90/50, placed on Bipap. Had paroxysms of SVT to 130s (hemodynamically tolerated). On exam, chronically ill appearing, JvP severely elevated, RRR, dec BS at bases, nontender abdomen, b/l pitting edema to sacrum, cool to palpation. Labs reviewed - Hb 11.5, BUN/Cr 97/3.04 (40/2.05), Tbili 3.2, alk phos 425, BNP 13k, lactat 3.4. [facility]  - 100% RCA, 70% pLAD. TTE reviewed - severe biventricular dysfunction, mod MR, mod-severe TR, IVC dilated, LVOT VTI 11 cm. Overall stage D HF, NYHA class IV, INTERMACS 3 with high-risk features and guarded prognosis.  - increase  to 5   - bumex gtt as above; goal net negative  - plan for [facility]  - can give oral amio load for SVT  - monitor electrolytes; Keep K>4, Mg>2  - discussed GOC; DNR/DNI

## 2024-07-01 NOTE — ED PROVIDER NOTE - CLINICAL SUMMARY MEDICAL DECISION MAKING FREE TEXT BOX
Admit to Dr. Erica Mcdonald when some labs back 79M PMH CAD, combined systolic and diastolic HF with EF 25-30% (Echo 4/2024), valvular disease), CKD, BPH, anxiety/depression/bipolar disorder, h/o lung cancer s/p RUL lobectomy (9/2022) with recurrent disease s/p radiation to left lung (completed 3/2023), recent MICU admission for heart failure with reduced EF <20%, on dobutamine drip, DNR/DNI, presenting to ED w/ HOTN, increasing SOB and b/l lower extremity edema. Pt states he was HOTNsive systolic 70s in which his cardiologist was called recommending to go to the ER.     Pt tachypneic, abdominal breathing and placed on BIPAP for respiratory support.     C/f decompensating heart failure  pt also noted to be in intermittent SVT HR 130s, self resolved to NSR in 80-90s.   case d/w heart failure specialist team [place], advised dobutamine drip , amio bolus/drip, and admit to CCU for further eval.

## 2024-07-02 ENCOUNTER — APPOINTMENT (OUTPATIENT)
Dept: CARDIOLOGY | Facility: CLINIC | Age: 79
End: 2024-07-02

## 2024-07-02 DIAGNOSIS — Z71.89 OTHER SPECIFIED COUNSELING: ICD-10-CM

## 2024-07-02 DIAGNOSIS — Z51.5 ENCOUNTER FOR PALLIATIVE CARE: ICD-10-CM

## 2024-07-02 DIAGNOSIS — R06.09 OTHER FORMS OF DYSPNEA: ICD-10-CM

## 2024-07-02 DIAGNOSIS — G47.00 INSOMNIA, UNSPECIFIED: ICD-10-CM

## 2024-07-02 DIAGNOSIS — F41.9 ANXIETY DISORDER, UNSPECIFIED: ICD-10-CM

## 2024-07-02 LAB
ALBUMIN SERPL ELPH-MCNC: 2.8 G/DL — LOW (ref 3.3–5)
ALBUMIN SERPL ELPH-MCNC: 3 G/DL — LOW (ref 3.3–5)
ALBUMIN SERPL ELPH-MCNC: 3.1 G/DL — LOW (ref 3.3–5)
ALBUMIN SERPL ELPH-MCNC: 3.2 G/DL — LOW (ref 3.3–5)
ALP SERPL-CCNC: 349 U/L — HIGH (ref 40–120)
ALP SERPL-CCNC: 352 U/L — HIGH (ref 40–120)
ALP SERPL-CCNC: 354 U/L — HIGH (ref 40–120)
ALP SERPL-CCNC: 363 U/L — HIGH (ref 40–120)
ALT FLD-CCNC: 34 U/L — SIGNIFICANT CHANGE UP (ref 4–41)
ALT FLD-CCNC: 39 U/L — SIGNIFICANT CHANGE UP (ref 4–41)
ALT FLD-CCNC: 64 U/L — HIGH (ref 4–41)
ALT FLD-CCNC: 76 U/L — HIGH (ref 4–41)
ANION GAP SERPL CALC-SCNC: 23 MMOL/L — HIGH (ref 7–14)
ANION GAP SERPL CALC-SCNC: 25 MMOL/L — HIGH (ref 7–14)
ANION GAP SERPL CALC-SCNC: 26 MMOL/L — HIGH (ref 7–14)
ANION GAP SERPL CALC-SCNC: 28 MMOL/L — HIGH (ref 7–14)
APPEARANCE UR: ABNORMAL
AST SERPL-CCNC: 155 U/L — HIGH (ref 4–40)
AST SERPL-CCNC: 180 U/L — HIGH (ref 4–40)
AST SERPL-CCNC: 74 U/L — HIGH (ref 4–40)
AST SERPL-CCNC: 76 U/L — HIGH (ref 4–40)
BACTERIA # UR AUTO: NEGATIVE /HPF — SIGNIFICANT CHANGE UP
BASE EXCESS BLDV CALC-SCNC: -10.5 MMOL/L — LOW (ref -2–3)
BASE EXCESS BLDV CALC-SCNC: -5.2 MMOL/L — LOW (ref -2–3)
BASE EXCESS BLDV CALC-SCNC: -6.5 MMOL/L — LOW (ref -2–3)
BASE EXCESS BLDV CALC-SCNC: -9 MMOL/L — LOW (ref -2–3)
BILIRUB SERPL-MCNC: 3.1 MG/DL — HIGH (ref 0.2–1.2)
BILIRUB SERPL-MCNC: 3.2 MG/DL — HIGH (ref 0.2–1.2)
BILIRUB SERPL-MCNC: 3.4 MG/DL — HIGH (ref 0.2–1.2)
BILIRUB SERPL-MCNC: 3.7 MG/DL — HIGH (ref 0.2–1.2)
BILIRUB UR-MCNC: ABNORMAL
BLOOD GAS VENOUS COMPREHENSIVE RESULT: SIGNIFICANT CHANGE UP
BUN SERPL-MCNC: 104 MG/DL — HIGH (ref 7–23)
BUN SERPL-MCNC: 109 MG/DL — HIGH (ref 7–23)
BUN SERPL-MCNC: 93 MG/DL — HIGH (ref 7–23)
BUN SERPL-MCNC: 95 MG/DL — HIGH (ref 7–23)
CALCIUM SERPL-MCNC: 8 MG/DL — LOW (ref 8.4–10.5)
CALCIUM SERPL-MCNC: 8.1 MG/DL — LOW (ref 8.4–10.5)
CALCIUM SERPL-MCNC: 8.5 MG/DL — SIGNIFICANT CHANGE UP (ref 8.4–10.5)
CALCIUM SERPL-MCNC: 8.8 MG/DL — SIGNIFICANT CHANGE UP (ref 8.4–10.5)
CAST: 69 /LPF — HIGH (ref 0–4)
CHLORIDE BLDV-SCNC: 91 MMOL/L — LOW (ref 96–108)
CHLORIDE BLDV-SCNC: 92 MMOL/L — LOW (ref 96–108)
CHLORIDE SERPL-SCNC: 87 MMOL/L — LOW (ref 98–107)
CHLORIDE SERPL-SCNC: 89 MMOL/L — LOW (ref 98–107)
CHLORIDE SERPL-SCNC: 90 MMOL/L — LOW (ref 98–107)
CHLORIDE SERPL-SCNC: 91 MMOL/L — LOW (ref 98–107)
CO2 BLDV-SCNC: 15.9 MMOL/L — LOW (ref 22–26)
CO2 BLDV-SCNC: 17 MMOL/L — LOW (ref 22–26)
CO2 BLDV-SCNC: 19.1 MMOL/L — LOW (ref 22–26)
CO2 BLDV-SCNC: 21 MMOL/L — LOW (ref 22–26)
CO2 SERPL-SCNC: 11 MMOL/L — LOW (ref 22–31)
CO2 SERPL-SCNC: 13 MMOL/L — LOW (ref 22–31)
CO2 SERPL-SCNC: 15 MMOL/L — LOW (ref 22–31)
CO2 SERPL-SCNC: 17 MMOL/L — LOW (ref 22–31)
COLOR SPEC: SIGNIFICANT CHANGE UP
CREAT SERPL-MCNC: 3.42 MG/DL — HIGH (ref 0.5–1.3)
CREAT SERPL-MCNC: 3.56 MG/DL — HIGH (ref 0.5–1.3)
CREAT SERPL-MCNC: 3.57 MG/DL — HIGH (ref 0.5–1.3)
CREAT SERPL-MCNC: 3.7 MG/DL — HIGH (ref 0.5–1.3)
DIFF PNL FLD: NEGATIVE — SIGNIFICANT CHANGE UP
EGFR: 16 ML/MIN/1.73M2 — LOW
EGFR: 17 ML/MIN/1.73M2 — LOW
EGFR: 17 ML/MIN/1.73M2 — LOW
EGFR: 18 ML/MIN/1.73M2 — LOW
GAS PNL BLDV: 126 MMOL/L — LOW (ref 136–145)
GAS PNL BLDV: 127 MMOL/L — LOW (ref 136–145)
GLUCOSE BLDV-MCNC: 167 MG/DL — HIGH (ref 70–99)
GLUCOSE BLDV-MCNC: 188 MG/DL — HIGH (ref 70–99)
GLUCOSE BLDV-MCNC: 81 MG/DL — SIGNIFICANT CHANGE UP (ref 70–99)
GLUCOSE BLDV-MCNC: 85 MG/DL — SIGNIFICANT CHANGE UP (ref 70–99)
GLUCOSE SERPL-MCNC: 166 MG/DL — HIGH (ref 70–99)
GLUCOSE SERPL-MCNC: 249 MG/DL — HIGH (ref 70–99)
GLUCOSE SERPL-MCNC: 82 MG/DL — SIGNIFICANT CHANGE UP (ref 70–99)
GLUCOSE SERPL-MCNC: 83 MG/DL — SIGNIFICANT CHANGE UP (ref 70–99)
GLUCOSE UR QL: NEGATIVE MG/DL — SIGNIFICANT CHANGE UP
HCO3 BLDV-SCNC: 15 MMOL/L — LOW (ref 22–29)
HCO3 BLDV-SCNC: 16 MMOL/L — LOW (ref 22–29)
HCO3 BLDV-SCNC: 18 MMOL/L — LOW (ref 22–29)
HCO3 BLDV-SCNC: 20 MMOL/L — LOW (ref 22–29)
HCT VFR BLD CALC: 32.2 % — LOW (ref 39–50)
HCT VFR BLDA CALC: 30 % — LOW (ref 39–51)
HCT VFR BLDA CALC: 31 % — LOW (ref 39–51)
HCT VFR BLDA CALC: 34 % — LOW (ref 39–51)
HCT VFR BLDA CALC: 35 % — LOW (ref 39–51)
HGB BLD CALC-MCNC: 10.1 G/DL — LOW (ref 12.6–17.4)
HGB BLD CALC-MCNC: 10.3 G/DL — LOW (ref 12.6–17.4)
HGB BLD CALC-MCNC: 11.2 G/DL — LOW (ref 12.6–17.4)
HGB BLD CALC-MCNC: 11.5 G/DL — LOW (ref 12.6–17.4)
HGB BLD-MCNC: 10.7 G/DL — LOW (ref 13–17)
HYALINE CASTS # UR AUTO: PRESENT
KETONES UR-MCNC: ABNORMAL MG/DL
LACTATE BLDV-MCNC: 2.4 MMOL/L — HIGH (ref 0.5–2)
LACTATE BLDV-MCNC: 4.3 MMOL/L — CRITICAL HIGH (ref 0.5–2)
LACTATE BLDV-MCNC: 9.4 MMOL/L — CRITICAL HIGH (ref 0.5–2)
LACTATE BLDV-MCNC: 9.8 MMOL/L — CRITICAL HIGH (ref 0.5–2)
LEUKOCYTE ESTERASE UR-ACNC: ABNORMAL
MAGNESIUM SERPL-MCNC: 2.4 MG/DL — SIGNIFICANT CHANGE UP (ref 1.6–2.6)
MAGNESIUM SERPL-MCNC: 2.6 MG/DL — SIGNIFICANT CHANGE UP (ref 1.6–2.6)
MAGNESIUM SERPL-MCNC: 2.7 MG/DL — HIGH (ref 1.6–2.6)
MCHC RBC-ENTMCNC: 29.7 PG — SIGNIFICANT CHANGE UP (ref 27–34)
MCHC RBC-ENTMCNC: 33.2 GM/DL — SIGNIFICANT CHANGE UP (ref 32–36)
MCV RBC AUTO: 89.4 FL — SIGNIFICANT CHANGE UP (ref 80–100)
NITRITE UR-MCNC: NEGATIVE — SIGNIFICANT CHANGE UP
NRBC # BLD: 1 /100 WBCS — HIGH (ref 0–0)
NRBC # FLD: 0.08 K/UL — HIGH (ref 0–0)
PCO2 BLDV: 31 MMHG — LOW (ref 42–55)
PCO2 BLDV: 31 MMHG — LOW (ref 42–55)
PCO2 BLDV: 32 MMHG — LOW (ref 42–55)
PCO2 BLDV: 36 MMHG — LOW (ref 42–55)
PH BLDV: 7.29 — LOW (ref 7.32–7.43)
PH BLDV: 7.32 — SIGNIFICANT CHANGE UP (ref 7.32–7.43)
PH BLDV: 7.35 — SIGNIFICANT CHANGE UP (ref 7.32–7.43)
PH BLDV: 7.36 — SIGNIFICANT CHANGE UP (ref 7.32–7.43)
PH UR: 5.5 — SIGNIFICANT CHANGE UP (ref 5–8)
PHOSPHATE SERPL-MCNC: 7.5 MG/DL — HIGH (ref 2.5–4.5)
PHOSPHATE SERPL-MCNC: 7.7 MG/DL — HIGH (ref 2.5–4.5)
PHOSPHATE SERPL-MCNC: 7.7 MG/DL — HIGH (ref 2.5–4.5)
PLATELET # BLD AUTO: 151 K/UL — SIGNIFICANT CHANGE UP (ref 150–400)
PO2 BLDV: 31 MMHG — SIGNIFICANT CHANGE UP (ref 25–45)
PO2 BLDV: 34 MMHG — SIGNIFICANT CHANGE UP (ref 25–45)
PO2 BLDV: 37 MMHG — SIGNIFICANT CHANGE UP (ref 25–45)
PO2 BLDV: 38 MMHG — SIGNIFICANT CHANGE UP (ref 25–45)
POTASSIUM BLDV-SCNC: 3.1 MMOL/L — LOW (ref 3.5–5.1)
POTASSIUM BLDV-SCNC: 3.3 MMOL/L — LOW (ref 3.5–5.1)
POTASSIUM BLDV-SCNC: 3.8 MMOL/L — SIGNIFICANT CHANGE UP (ref 3.5–5.1)
POTASSIUM BLDV-SCNC: 4.6 MMOL/L — SIGNIFICANT CHANGE UP (ref 3.5–5.1)
POTASSIUM SERPL-MCNC: 3.3 MMOL/L — LOW (ref 3.5–5.3)
POTASSIUM SERPL-MCNC: 3.6 MMOL/L — SIGNIFICANT CHANGE UP (ref 3.5–5.3)
POTASSIUM SERPL-MCNC: 3.8 MMOL/L — SIGNIFICANT CHANGE UP (ref 3.5–5.3)
POTASSIUM SERPL-MCNC: 9.3 MMOL/L — CRITICAL HIGH (ref 3.5–5.3)
POTASSIUM SERPL-SCNC: 3.3 MMOL/L — LOW (ref 3.5–5.3)
POTASSIUM SERPL-SCNC: 3.6 MMOL/L — SIGNIFICANT CHANGE UP (ref 3.5–5.3)
POTASSIUM SERPL-SCNC: 3.8 MMOL/L — SIGNIFICANT CHANGE UP (ref 3.5–5.3)
POTASSIUM SERPL-SCNC: 9.3 MMOL/L — CRITICAL HIGH (ref 3.5–5.3)
PROT SERPL-MCNC: 6.2 G/DL — SIGNIFICANT CHANGE UP (ref 6–8.3)
PROT SERPL-MCNC: 6.3 G/DL — SIGNIFICANT CHANGE UP (ref 6–8.3)
PROT SERPL-MCNC: 6.4 G/DL — SIGNIFICANT CHANGE UP (ref 6–8.3)
PROT SERPL-MCNC: 6.7 G/DL — SIGNIFICANT CHANGE UP (ref 6–8.3)
PROT UR-MCNC: 100 MG/DL
RBC # BLD: 3.6 M/UL — LOW (ref 4.2–5.8)
RBC # FLD: 17.6 % — HIGH (ref 10.3–14.5)
RBC CASTS # UR COMP ASSIST: 3 /HPF — SIGNIFICANT CHANGE UP (ref 0–4)
REVIEW: SIGNIFICANT CHANGE UP
SAO2 % BLDV: 30.3 % — LOW (ref 67–88)
SAO2 % BLDV: 42.6 % — LOW (ref 67–88)
SAO2 % BLDV: 48.6 % — LOW (ref 67–88)
SAO2 % BLDV: 57.2 % — LOW (ref 67–88)
SODIUM SERPL-SCNC: 127 MMOL/L — LOW (ref 135–145)
SODIUM SERPL-SCNC: 128 MMOL/L — LOW (ref 135–145)
SODIUM SERPL-SCNC: 130 MMOL/L — LOW (ref 135–145)
SODIUM SERPL-SCNC: 130 MMOL/L — LOW (ref 135–145)
SP GR SPEC: 1.02 — SIGNIFICANT CHANGE UP (ref 1–1.03)
SQUAMOUS # UR AUTO: 11 /HPF — HIGH (ref 0–5)
UROBILINOGEN FLD QL: 1 MG/DL — SIGNIFICANT CHANGE UP (ref 0.2–1)
WBC # BLD: 5.52 K/UL — SIGNIFICANT CHANGE UP (ref 3.8–10.5)
WBC # FLD AUTO: 5.52 K/UL — SIGNIFICANT CHANGE UP (ref 3.8–10.5)
WBC UR QL: 10 /HPF — HIGH (ref 0–5)

## 2024-07-02 PROCEDURE — 99291 CRITICAL CARE FIRST HOUR: CPT | Mod: FS

## 2024-07-02 PROCEDURE — 99291 CRITICAL CARE FIRST HOUR: CPT

## 2024-07-02 PROCEDURE — 36620 INSERTION CATHETER ARTERY: CPT

## 2024-07-02 PROCEDURE — G0316 PROLONG INPT EVAL ADD15 M: CPT

## 2024-07-02 PROCEDURE — 99223 1ST HOSP IP/OBS HIGH 75: CPT

## 2024-07-02 RX ORDER — BUMETANIDE 0.25 MG/ML
4 INJECTION INTRAMUSCULAR; INTRAVENOUS ONCE
Refills: 0 | Status: COMPLETED | OUTPATIENT
Start: 2024-07-02 | End: 2024-07-02

## 2024-07-02 RX ORDER — CHLOROTHIAZIDE 500 MG
500 TABLET ORAL ONCE
Refills: 0 | Status: DISCONTINUED | OUTPATIENT
Start: 2024-07-02 | End: 2024-07-02

## 2024-07-02 RX ORDER — BUMETANIDE 0.25 MG/ML
1 INJECTION INTRAMUSCULAR; INTRAVENOUS
Qty: 20 | Refills: 0 | Status: DISCONTINUED | OUTPATIENT
Start: 2024-07-02 | End: 2024-07-08

## 2024-07-02 RX ORDER — SODIUM NITROPRUSSIDE 0.2 MG/ML
0.3 INJECTION, SOLUTION INTRAVENOUS
Qty: 100 | Refills: 0 | Status: DISCONTINUED | OUTPATIENT
Start: 2024-07-02 | End: 2024-07-03

## 2024-07-02 RX ORDER — HYDRALAZINE HYDROCHLORIDE 50 MG/1
10 TABLET ORAL EVERY 8 HOURS
Refills: 0 | Status: DISCONTINUED | OUTPATIENT
Start: 2024-07-02 | End: 2024-07-08

## 2024-07-02 RX ORDER — METOCLOPRAMIDE 5 MG/5ML
10 SOLUTION ORAL ONCE
Refills: 0 | Status: COMPLETED | OUTPATIENT
Start: 2024-07-02 | End: 2024-07-02

## 2024-07-02 RX ORDER — LORAZEPAM 0.5 MG
0.25 TABLET ORAL AT BEDTIME
Refills: 0 | Status: DISCONTINUED | OUTPATIENT
Start: 2024-07-02 | End: 2024-07-04

## 2024-07-02 RX ORDER — DOBUTAMINE HCL 250MG/20ML
5 VIAL (ML) INTRAVENOUS
Qty: 500 | Refills: 0 | Status: DISCONTINUED | OUTPATIENT
Start: 2024-07-02 | End: 2024-07-03

## 2024-07-02 RX ORDER — CHLOROTHIAZIDE 500 MG
500 TABLET ORAL ONCE
Refills: 0 | Status: COMPLETED | OUTPATIENT
Start: 2024-07-02 | End: 2024-07-02

## 2024-07-02 RX ORDER — SODIUM CHLORIDE 3 G/100ML
150 INJECTION, SOLUTION INTRAVENOUS ONCE
Refills: 0 | Status: COMPLETED | OUTPATIENT
Start: 2024-07-02 | End: 2024-07-02

## 2024-07-02 RX ORDER — METOCLOPRAMIDE 5 MG/5ML
10 SOLUTION ORAL ONCE
Refills: 0 | Status: DISCONTINUED | OUTPATIENT
Start: 2024-07-02 | End: 2024-07-02

## 2024-07-02 RX ORDER — ALTEPLASE 2.2 MG/2ML
2 INJECTION, POWDER, LYOPHILIZED, FOR SOLUTION INTRAVENOUS ONCE
Refills: 0 | Status: DISCONTINUED | OUTPATIENT
Start: 2024-07-02 | End: 2024-07-02

## 2024-07-02 RX ORDER — POTASSIUM CHLORIDE 600 MG/1
10 TABLET, FILM COATED, EXTENDED RELEASE ORAL
Refills: 0 | Status: COMPLETED | OUTPATIENT
Start: 2024-07-02 | End: 2024-07-02

## 2024-07-02 RX ORDER — ONDANSETRON HYDROCHLORIDE 2 MG/ML
4 INJECTION INTRAMUSCULAR; INTRAVENOUS ONCE
Refills: 0 | Status: COMPLETED | OUTPATIENT
Start: 2024-07-02 | End: 2024-07-02

## 2024-07-02 RX ADMIN — SODIUM NITROPRUSSIDE 3.83 MICROGRAM(S)/KG/MIN: 0.2 INJECTION, SOLUTION INTRAVENOUS at 19:32

## 2024-07-02 RX ADMIN — POTASSIUM CHLORIDE 100 MILLIEQUIVALENT(S): 600 TABLET, FILM COATED, EXTENDED RELEASE ORAL at 19:31

## 2024-07-02 RX ADMIN — HYDRALAZINE HYDROCHLORIDE 10 MILLIGRAM(S): 50 TABLET ORAL at 21:22

## 2024-07-02 RX ADMIN — Medication 12.8 MICROGRAM(S)/KG/MIN: at 19:31

## 2024-07-02 RX ADMIN — BUMETANIDE 10 MG/HR: 0.25 INJECTION INTRAMUSCULAR; INTRAVENOUS at 19:31

## 2024-07-02 RX ADMIN — Medication 0.25 MILLIGRAM(S): at 22:00

## 2024-07-02 RX ADMIN — ASPIRIN 81 MILLIGRAM(S): 325 TABLET, FILM COATED ORAL at 13:01

## 2024-07-02 RX ADMIN — BUMETANIDE 132 MILLIGRAM(S): 0.25 INJECTION INTRAMUSCULAR; INTRAVENOUS at 06:12

## 2024-07-02 RX ADMIN — Medication 20 MILLIGRAM(S): at 05:43

## 2024-07-02 RX ADMIN — SODIUM NITROPRUSSIDE 3.83 MICROGRAM(S)/KG/MIN: 0.2 INJECTION, SOLUTION INTRAVENOUS at 08:50

## 2024-07-02 RX ADMIN — TAMSULOSIN HYDROCHLORIDE 0.8 MILLIGRAM(S): 0.4 CAPSULE ORAL at 21:23

## 2024-07-02 RX ADMIN — METOCLOPRAMIDE 10 MILLIGRAM(S): 5 SOLUTION ORAL at 04:36

## 2024-07-02 RX ADMIN — Medication 1 APPLICATION(S): at 05:43

## 2024-07-02 RX ADMIN — HEPARIN SODIUM 5000 UNIT(S): 50 INJECTION, SOLUTION INTRAVENOUS at 06:00

## 2024-07-02 RX ADMIN — POTASSIUM CHLORIDE 100 MILLIEQUIVALENT(S): 600 TABLET, FILM COATED, EXTENDED RELEASE ORAL at 18:19

## 2024-07-02 RX ADMIN — PANTOPRAZOLE SODIUM 40 MILLIGRAM(S): 40 INJECTION, POWDER, FOR SOLUTION INTRAVENOUS at 05:43

## 2024-07-02 RX ADMIN — CLOPIDOGREL BISULFATE 75 MILLIGRAM(S): 75 TABLET, FILM COATED ORAL at 13:01

## 2024-07-02 RX ADMIN — MIRTAZAPINE 15 MILLIGRAM(S): 15 TABLET, FILM COATED ORAL at 21:24

## 2024-07-02 RX ADMIN — Medication 200 MILLIGRAM(S): at 09:10

## 2024-07-02 RX ADMIN — HEPARIN SODIUM 5000 UNIT(S): 50 INJECTION, SOLUTION INTRAVENOUS at 17:43

## 2024-07-02 RX ADMIN — LATANOPROST PF 1 DROP(S): 0.05 SOLUTION/ DROPS OPHTHALMIC at 21:24

## 2024-07-02 RX ADMIN — ATORVASTATIN CALCIUM 40 MILLIGRAM(S): 20 TABLET, FILM COATED ORAL at 21:22

## 2024-07-02 RX ADMIN — Medication 12.5 MILLIGRAM(S): at 23:08

## 2024-07-02 RX ADMIN — Medication 3 MILLIGRAM(S): at 21:24

## 2024-07-02 RX ADMIN — POTASSIUM CHLORIDE 100 MILLIEQUIVALENT(S): 600 TABLET, FILM COATED, EXTENDED RELEASE ORAL at 20:48

## 2024-07-02 RX ADMIN — SODIUM CHLORIDE 300 MILLILITER(S): 3 INJECTION, SOLUTION INTRAVENOUS at 17:43

## 2024-07-02 NOTE — CHART NOTE - NSCHARTNOTEFT_GEN_A_CORE
The following are the REENA numbers with MAP 65 and HR 71:    Drips:   Nitroprusside @ 0.3  Bumex @2/hr    @ 5    CVP: 20  CO: 4  CI: 1.9   SVR: 920  SCvO2: 57.2%    Will add hydral to wean Niprid and cont afterload reduction and diureses

## 2024-07-02 NOTE — CONSULT NOTE ADULT - PROBLEM SELECTOR RECOMMENDATION 2
- Per patient and wife. Patient has tried ambien and melatonin with no effect. Has also tried Temazepam, but made him very lethargic the next day.   - Wife shares that patient anxious that if he closes eyes, he may not wake up and states anxiety may possibly be related to his insomnia.   - Sleep Hygiene discussed

## 2024-07-02 NOTE — PROGRESS NOTE ADULT - ASSESSMENT
79 year old male known to the [facility] HF clinic (Followed by Dr. Shell), with PMH of CAD with STEMI  with cardiogenic shock and IABP placement,  but was not revascularized due to non viable myocardium, ICM (TTE 24 LVEF 20%, RV systolic dysfunction, moderate MR/TR), LV thrombus, prior lung adenocarcinoma with RUL VATS in 2022 s/p radiation therapy comes in with complaints of SOB, LE and hypotension. Of note- patient has been seen at [facility] 5 x this year for SOB, BRODY and was placed on Dobutamine gtt home infusion. Per patient's wife he was unable to take his diuretics due to hypotension at home for which in hopes of raising BP he was given a salt load with bullions. He continued to worsen, and patient's wife called [facility] HF clinic and he was advised to come to ED for immediate care. In ED patient was found in decompensated HF/ cardiogenic shock, tachypneic tachycardic ( SVT) 130s , hypotensive with SBP 70-90s, and hypoxemic. Pt started on bipap, swan placed, pt started on ionotropes and admitted to CCU    Neuro  # bipolar disorder/depression/anxiety   - AxOX3  - intermittent lethargy likely from shock state  - cont home psych meds     Resp  #HRF  - likely from pulm edema i/s of cardiogenic shock  -weaned from NIV to 4LNC   - now breathing comfortably. titrate for sat>92%  # history of lung cancer s/p RUL lobectomy (2022) with recurrent disease s/p radiation to left lung (completed 3/2023),   -Stage IV lung carcinoma, s/p local RT, never on systemic chemotherapy        Cardiac  #Cardiogenic shock  -h/o  chronic systolic congestive heart failure with cardiogenic shock which has worsened overnight  -lactate rising to 9 overnight   - TTE  EF 20% with RV systolic dysfx and moderate MR  - 7/2 AM numbers: CVP 26, CO3.1, CI 1.5, SVR 1282, SVO2 42% this AM  -  increased to 7.5 and bumex drip increased to 2mg/hr and given diuril for oliguria   - will decrease  back to 5/hr and start Nitroprusside for afterload reduction   -will repeat numbers this afternoon       # coronary artery disease   - [facility] on 2022 showed RCA  and significant LAD and diagonal lesions-but was not revascularized due to non viable myocardium  - c/w Aspirin an Plavix and Lipitor    GI  - abd soft  - DASH diet now pt comfortable off NIV  -cont protonix for GI ppx        # BRODY on CKD  Baseline creatinine: 2  -worsening crn likely from cardiogenic shock   - inc bumex drip and stat dose of diuril for oilguria and volume overload  - monitor UO   -avoid nephrotoxins  - pt does not want HD if needed  -worsening lactatic acidosis from cardiogenic shock. see CV section for mngmt     Heme:  -cbc stable   HSQ for DVT ppx    Endo:   -A1c 6.2  -TSH on  wnl   -maintain gluc<180 on bmp    #Ethics:  -reconfirmed with pt and wife at bedside that pt is DNR/DNI and doesnt want HD. We explained that we are trying our best to optimize his cardiogenic shock but his prognosis is poor with his worsening lactic acidosis and renal failure.    d/w CCU fellow and Dr Mcdonald  79 year old male known to the [facility] HF clinic (Followed by Dr. Shell), with PMH of CAD with STEMI  with cardiogenic shock and IABP placement,  but was not revascularized due to non viable myocardium, ICM (TTE 24 LVEF 20%, RV systolic dysfunction, moderate MR/TR), LV thrombus, prior lung adenocarcinoma with RUL VATS in 2022 s/p radiation therapy comes in with complaints of SOB, LE and hypotension. Of note- patient has been seen at [facility] 5 x this year for SOB, BRODY and was placed on Dobutamine gtt home infusion. Per patient's wife he was unable to take his diuretics due to hypotension at home for which in hopes of raising BP he was given a salt load with bullions. He continued to worsen, and patient's wife called [facility] HF clinic and he was advised to come to ED for immediate care. In ED patient was found in decompensated HF/ cardiogenic shock, tachypneic tachycardic ( SVT) 130s , hypotensive with SBP 70-90s, and hypoxemic. Pt started on bipap, swan placed, pt started on ionotropes and admitted to CCU    Neuro  # bipolar disorder/depression/anxiety   - AxOX3  - intermittent lethargy likely from shock state  - cont home psych meds     Resp  #HRF  - likely from pulm edema i/s of cardiogenic shock  -weaned from NIV to 4LNC   - now breathing comfortably. titrate for sat>92%  # history of lung cancer s/p RUL lobectomy (2022) with recurrent disease s/p radiation to left lung (completed 3/2023),   -Stage IV lung carcinoma, s/p local RT, never on systemic chemotherapy        Cardiac  #Cardiogenic shock  -h/o  chronic systolic congestive heart failure with cardiogenic shock which has worsened overnight  -lactate rising to 9 overnight   - TTE  EF 20% with RV systolic dysfx and moderate MR  - 7/2 AM numbers: CVP 26, CO3.1, CI 1.5, SVR 1282, SVO2 42% this AM  -  increased to 7.5 and bumex drip increased to 2mg/hr and given diuril for oliguria   - will decrease  back to 5/hr and start Nitroprusside for afterload reduction   -will repeat numbers this afternoon       # coronary artery disease   - [facility] on 2022 showed RCA  and significant LAD and diagonal lesions-but was not revascularized due to non viable myocardium  - c/w Aspirin an Plavix and Lipitor    #SVT  - likely in setting of cardiogenic shock  -pt recieved amio and on Amio drip now. Now that pt's BP has normalized, no further episodes of SVT. Will d/c Amio.     GI  - abd soft  - DASH diet now pt comfortable off NIV  -cont protonix for GI ppx        # BRODY on CKD  Baseline creatinine: 2  -worsening crn likely from cardiogenic shock   - inc bumex drip and stat dose of diuril for oilguria and volume overload  - monitor UO   -avoid nephrotoxins  - pt does not want HD if needed  -worsening lactatic acidosis from cardiogenic shock. see CV section for mngmt     Heme:  -cbc stable   HSQ for DVT ppx    Endo:   -A1c 6.2  -TSH on  wnl   -maintain gluc<180 on bmp    #Ethics:  -reconfirmed with pt and wife at bedside that pt is DNR/DNI and doesnt want HD. We explained that we are trying our best to optimize his cardiogenic shock but his prognosis is poor with his worsening lactic acidosis and renal failure.    d/w CCU fellow and Dr Mcdonald

## 2024-07-02 NOTE — PROGRESS NOTE ADULT - SUBJECTIVE AND OBJECTIVE BOX
Medications:  aspirin enteric coated 81 milliGRAM(s) Oral daily  atorvastatin 40 milliGRAM(s) Oral at bedtime  buMETAnide Infusion 2 mG/Hr IV Continuous <Continuous>  chlorhexidine 2% Cloths 1 Application(s) Topical <User Schedule>  clopidogrel Tablet 75 milliGRAM(s) Oral daily  DOBUTamine Infusion 7.5 MICROgram(s)/kG/Min IV Continuous <Continuous>  FLUoxetine 20 milliGRAM(s) Oral <User Schedule>  heparin   Injectable 5000 Unit(s) SubCutaneous every 12 hours  latanoprost 0.005% Ophthalmic Solution 1 Drop(s) Both EYES at bedtime  melatonin 3 milliGRAM(s) Oral at bedtime  mirtazapine 15 milliGRAM(s) Oral at bedtime  nitroprusside Infusion 0.3 MICROgram(s)/kG/Min IV Continuous <Continuous>  pantoprazole    Tablet 40 milliGRAM(s) Oral before breakfast  sodium chloride 0.9% lock flush 10 milliLiter(s) IV Push every 1 hour PRN  tamsulosin 0.8 milliGRAM(s) Oral at bedtime  zolpidem 5 milliGRAM(s) Oral at bedtime PRN      Vitals:  Vital Signs Last 24 Hrs  T(C): 36.5 (02 Jul 2024 05:00), Max: 36.8 (01 Jul 2024 17:27)  T(F): 97.7 (02 Jul 2024 05:00), Max: 98.3 (01 Jul 2024 17:27)  HR: 66 (02 Jul 2024 11:55) (64 - 138)  BP: 88/53 (02 Jul 2024 07:00) (73/47 - 113/60)  BP(mean): 65 (02 Jul 2024 07:00) (55 - 88)  RR: 18 (02 Jul 2024 11:00) (12 - 28)  SpO2: 98% (02 Jul 2024 11:55) (90% - 100%)    Parameters below as of 02 Jul 2024 07:00  Patient On (Oxygen Delivery Method): nasal cannula  O2 Flow (L/min): 4      Daily Height in cm: 182.88 (01 Jul 2024 13:27)    Daily     I&O's Detail    01 Jul 2024 07:01  -  02 Jul 2024 07:00  --------------------------------------------------------  IN:    Amiodarone: 66 mL    Amiodarone: 99 mL    Amiodarone: 144.7 mL    Bumetanide: 60 mL    Bumetanide: 15 mL    DOBUTamine: 156 mL    DOBUTamine: 38 mL    IV PiggyBack: 300 mL    Nitroprusside: 3.8 mL  Total IN: 882.5 mL    OUT:    Indwelling Catheter - Urethral (mL): 40 mL    Voided (mL): 200 mL  Total OUT: 240 mL    Total NET: 642.5 mL      02 Jul 2024 07:01  -  02 Jul 2024 13:26  --------------------------------------------------------  IN:    Amiodarone: 66.8 mL    Bumetanide: 50 mL    DOBUTamine: 93.5 mL    Nitroprusside: 19.1 mL  Total IN: 229.4 mL    OUT:    Indwelling Catheter - Urethral (mL): 200 mL  Total OUT: 200 mL    Total NET: 29.4 mL          Physical Exam:     General: No distress. Comfortable.  HEENT: EOM intact.  Neck: Neck supple. JVP not elevated. No masses  Chest: Clear to auscultation bilaterally  CV: Normal S1 and S2. No murmurs, rub, or gallops. Radial pulses normal.  Abdomen: Soft, non-distended, non-tender  Skin: No rashes or skin breakdown  Neurology: Alert and oriented times three. Sensation intact  Psych: Affect normal    Labs:                        10.7   5.52  )-----------( 151      ( 02 Jul 2024 01:05 )             32.2     07-02    130<L>  |  89<L>  |  95<H>  ----------------------------<  83  3.8   |  13<L>  |  3.56<H>    Ca    8.8      02 Jul 2024 02:00  Phos  7.7     07-02  Mg     2.70     07-02    TPro  6.7  /  Alb  3.2<L>  /  TBili  3.4<H>  /  DBili  x   /  AST  76<H>  /  ALT  39  /  AlkPhos  363<H>  07-02           Medications:  aspirin enteric coated 81 milliGRAM(s) Oral daily  atorvastatin 40 milliGRAM(s) Oral at bedtime  buMETAnide Infusion 2 mG/Hr IV Continuous <Continuous>  chlorhexidine 2% Cloths 1 Application(s) Topical <User Schedule>  clopidogrel Tablet 75 milliGRAM(s) Oral daily  DOBUTamine Infusion 7.5 MICROgram(s)/kG/Min IV Continuous <Continuous>  FLUoxetine 20 milliGRAM(s) Oral <User Schedule>  heparin   Injectable 5000 Unit(s) SubCutaneous every 12 hours  latanoprost 0.005% Ophthalmic Solution 1 Drop(s) Both EYES at bedtime  melatonin 3 milliGRAM(s) Oral at bedtime  mirtazapine 15 milliGRAM(s) Oral at bedtime  nitroprusside Infusion 0.3 MICROgram(s)/kG/Min IV Continuous <Continuous>  pantoprazole    Tablet 40 milliGRAM(s) Oral before breakfast  sodium chloride 0.9% lock flush 10 milliLiter(s) IV Push every 1 hour PRN  tamsulosin 0.8 milliGRAM(s) Oral at bedtime  zolpidem 5 milliGRAM(s) Oral at bedtime PRN      Vitals:  Vital Signs Last 24 Hrs  T(C): 36.5 (02 Jul 2024 05:00), Max: 36.8 (01 Jul 2024 17:27)  T(F): 97.7 (02 Jul 2024 05:00), Max: 98.3 (01 Jul 2024 17:27)  HR: 66 (02 Jul 2024 11:55) (64 - 138)  BP: 88/53 (02 Jul 2024 07:00) (73/47 - 113/60)  BP(mean): 65 (02 Jul 2024 07:00) (55 - 88)  RR: 18 (02 Jul 2024 11:00) (12 - 28)  SpO2: 98% (02 Jul 2024 11:55) (90% - 100%)    Parameters below as of 02 Jul 2024 07:00  Patient On (Oxygen Delivery Method): nasal cannula  O2 Flow (L/min): 4      Daily Height in cm: 182.88 (01 Jul 2024 13:27)    Daily     I&O's Detail    01 Jul 2024 07:01  -  02 Jul 2024 07:00  --------------------------------------------------------  IN:    Amiodarone: 66 mL    Amiodarone: 99 mL    Amiodarone: 144.7 mL    Bumetanide: 60 mL    Bumetanide: 15 mL    DOBUTamine: 156 mL    DOBUTamine: 38 mL    IV PiggyBack: 300 mL    Nitroprusside: 3.8 mL  Total IN: 882.5 mL    OUT:    Indwelling Catheter - Urethral (mL): 40 mL    Voided (mL): 200 mL  Total OUT: 240 mL    Total NET: 642.5 mL      02 Jul 2024 07:01  -  02 Jul 2024 13:26  --------------------------------------------------------  IN:    Amiodarone: 66.8 mL    Bumetanide: 50 mL    DOBUTamine: 93.5 mL    Nitroprusside: 19.1 mL  Total IN: 229.4 mL    OUT:    Indwelling Catheter - Urethral (mL): 200 mL  Total OUT: 200 mL    Total NET: 29.4 mL          Physical Exam:     General: No distress. Comfortable.  HEENT: EOM intact.  Neck: Neck supple. JVP severely elevated. No masses  Chest: crackles b/l   CV: Normal S1 and S2. No murmurs, rub, or gallops. Radial pulses normal. 2+ b/l LE edema and is warm b/l.   Abdomen: Soft, non-distended, non-tender  Skin: No rashes or skin breakdown  Neurology: Alert and oriented times three. Sensation intact  Psych: Affect normal    Labs:                        10.7   5.52  )-----------( 151      ( 02 Jul 2024 01:05 )             32.2     07-02    130<L>  |  89<L>  |  95<H>  ----------------------------<  83  3.8   |  13<L>  |  3.56<H>    Ca    8.8      02 Jul 2024 02:00  Phos  7.7     07-02  Mg     2.70     07-02    TPro  6.7  /  Alb  3.2<L>  /  TBili  3.4<H>  /  DBili  x   /  AST  76<H>  /  ALT  39  /  AlkPhos  363<H>  07-02

## 2024-07-02 NOTE — CHART NOTE - NSCHARTNOTEFT_GEN_A_CORE
With HR 67 and MAP of 75 the following are the repeat numbers on the following drips at 11am:     5 mg/hr  Nitroprusside 0.3  Bumex: 2mg/hr    CVP: 20  CO: 3.3  CI: 1.6  SVR: 1333  SVcO2: 48.6    -will increase Nitroprusside infusion for better afterload reduction to improve CO With HR 67 and MAP of 75 the following are the repeat numbers on the following drips at 11am:     7.5 mg/hr  Nitroprusside 0.3  Bumex: 2mg/hr    CVP: 20  CO: 3.3  CI: 1.6  SVR: 1333  SVcO2: 48.6    -will dec  to 5 and  increase Nitroprusside infusion for better afterload reduction to improve CO    d/w CCU fellow

## 2024-07-02 NOTE — CONSULT NOTE ADULT - ASSESSMENT
This is a 79 year old male known to the [facility] HF clinic (Followed by Dr. Shell), with PMH of CAD with STEMI 5/20222 with cardiogenic shock and IABP placement,  but was not revascularized due to non viable myocardium, ICM (TTE 6/6/24 LVEF 20%, RV systolic dysfunction, moderate MR/TR), LV thrombus, prior lung adenocarcinoma with RUL VATS in 11/2022 s/p radiation therapy comes in with complaints of SOB, LE and hypotension. Of note- patient has been seen at [facility] 5 x this year for SOB, BRODY and was placed on Dobutamine gtt home infusion. Per patient's wife he was unable to take his diuretics due to hypotension at home for which in hopes of raising BP he was given a salt load with bullions. He continued to worsen, and patient's wife called [facility] HF clinic and he was advised to come to ED for immediate care. In ED patient is tachypenic, tachycardic with BP 60-130s (SVT), hypotensive with SBP 70-90s, O2 sat 70s, and was placed on BIPAP. Home Dobutamine gtt was held in ED due to SVT, but was restarted s/p HF consultation. Patient admits to SOB at rest, worsening LE edema and weakness. Labs show H/h11.5/33.5, BRODY BUN 97/3.09, na 134, Cl 92, Alk phos 425, AST 75, ALT 36, proBNP 30812, lactate 3.4. Overall stage D HF, NYHA class IV- is in cardiogenic shock with massive volume overload- hypotensive with SBP 70-90s, tachycardic, tachypnic, BRODY. Elevated Lactate 3.4 getting transferred to CCU. 
79 year old male known to the [facility] HF clinic (Followed by Dr. Shell), with PMH of CAD with STEMI 5/20222 with cardiogenic shock and IABP placement,  but was not revascularized due to non viable myocardium, ICM (TTE 6/6/24 LVEF 20%, RV systolic dysfunction, moderate MR/TR), LV thrombus, prior lung adenocarcinoma with RUL VATS in 11/2022 s/p radiation therapy comes in with complaints of SOB, LE and hypotension. Of note- patient has been seen at [facility] 5 x this year for SOB, BRODY and was placed on Dobutamine gtt home infusion. Per patient's wife he was unable to take his diuretics due to hypotension at home for which in hopes of raising BP he was given a salt load with bullions. He continued to worsen, and patient's wife called [facility] HF clinic. Patient was found to be in cardiogenic shock with massive volume overload.

## 2024-07-02 NOTE — CONSULT NOTE ADULT - SUBJECTIVE AND OBJECTIVE BOX
Date of Service 24 @ 16:32    HPI:   79 year old male known to the [facility] HF clinic (Followed by Dr. Shell), with PMH of CAD with STEMI  with cardiogenic shock and IABP placement,  but was not revascularized due to non viable myocardium, ICM (TTE 24 LVEF 20%, RV systolic dysfunction, moderate MR/TR), LV thrombus, prior lung adenocarcinoma with RUL VATS in 2022 s/p radiation therapy comes in with complaints of SOB, LE and hypotension. Of note- patient has been seen at [facility] 5 x this year for SOB, BRODY and was placed on Dobutamine gtt home infusion. Per patient's wife he was unable to take his diuretics due to hypotension at home for which in hopes of raising BP he was given a salt load with bullions. He continued to worsen, and patient's wife called [facility] HF clinic and he was advised to come to ED for immediate care. In ED patient is tachypenic, tachycardic with BP 60-130s (SVT), hypotensive with SBP 70-90s, O2 sat 70s, and was placed on BIPAP. Home Dobutamine gtt was held in ED due to SVT,  Started on amiodarone drip and  restarted on Dobutamine drip . Patient admits to SOB at rest, worsening LE edema and weakness. Labs show H/H 11.5/33.5, BRODY BUN 97/3.09, Na 134, Cl 92, Alk phos 425, AST 75, ALT 36, pro BNP 13085, lactate 3.4. Patient was seen by heart failure team and received bumex 4mg IV x1 followed by Bumex drip. RIJ cordis was placed. Patient was admitted for acute on chronic heart failure with hypotension/ volume overload.  (2024 17:31)    PERTINENT PM/SXH:   BPH (benign prostatic hyperplasia)  Bipolar disorder  Depression  Anxiety  Umbilical hernia, incarcerated  Inguinal hernia of right side without obstruction or gangrene  Depression  Constipation  Urinary retention  CAD (coronary artery disease)  SCC (squamous cell carcinoma)  Lung mass  Other nonspecific abnormal finding of lung field  LV (left ventricular) mural thrombus  History of inguinal hernia  Severe left ventricular systolic dysfunction  History of CHF (congestive heart failure)  CHF, chronic  Heart failure with reduced ejection fraction  Anxiety  Depression  BPH (benign prostatic hyperplasia)  History of arthroscopy of knee  History of tonsillectomy  History of hernia repair  H/O coronary angiogram    FAMILY HISTORY:  Family history of depression (Mother)  FH: CAD (coronary artery disease) (Sibling, Sibling)  Family history of diabetes mellitus (DM) (Sibling)    ITEMS NOT CHECKED ARE NOT PRESENT    SOCIAL HISTORY:   Significant other/partner[ x]  Children[ ]  Episcopal/Spirituality:  Substance hx:  [ ]   Tobacco hx:  [ ]   Alcohol hx: [ ]   Home Opioid hx:  [ ] I-Stop Reference No: [id number]  Prescription Information      PDI Filter:    PDI	Current Rx	Drug Type	Rx Written	Rx Dispensed	Drug	Quantity	Days Supply	Prescriber Name	Prescriber RADHA #	Payment Method	Dispenser  A	N	B	2024	temazepam 15 mg capsule	30	30	AimeeJusto MD, [id number]	Other	[id number]  A	N	B	2024	temazepam 15 mg capsule	30	30	AimeeJusto MD, [id number]	Other	[id number]  A	N	B	2024	temazepam 15 mg capsule	30	30	AimeeJusto MD	[id number]	Other	[id number]  A	N	B	2023	temazepam 15 mg capsule	30	30	AimeeJusto MD	[id number]	Other	[id number]  A	N	B	2023	temazepam 15 mg capsule	30	30	AimeeJusto MD	[id number]	Other	[id number]  A	N	B	10/23/2023	10/24/2023	temazepam 15 mg capsule	30	30	AimeeJusto MD	[id number]	Cash	[id number]  A	N	B	2023	lorazepam 1 mg tablet	30	30	AimeeJusto MD	[id number]	Cash	[id number]  A	N	B	2023	lorazepam 0.5 mg tablet	15	15	AimeeJusto licona MD	[id number]	Cash	[id number]  A	N	B	2023	lorazepam 0.5 mg tablet	15	15	AimeeJusto MD	[id number]	[id number]  Living Situation: [x ]Home  [ ]Long term care  [ ]Rehab [ ]Other      ADVANCE DIRECTIVES:    MOLST  [ ]  Living Will  [ ]   DECISION MAKER(s):  [ ] Health Care Proxy(s)  [ x] Surrogate(s)  [ ] Guardian           Name(s): Phone Number(s):  Wife: Kenia Becerra: [phone]    BASELINE (I)ADL(s) (prior to admission):  [place]: [ ]Total  [ x] Moderate [ ]Dependent    Allergies    No Known Allergies    Intolerances    MEDICATIONS  (STANDING):  aspirin enteric coated 81 milliGRAM(s) Oral daily  atorvastatin 40 milliGRAM(s) Oral at bedtime  buMETAnide Infusion 2 mG/Hr (10 mL/Hr) IV Continuous <Continuous>  chlorhexidine 2% Cloths 1 Application(s) Topical <User Schedule>  clopidogrel Tablet 75 milliGRAM(s) Oral daily  DOBUTamine Infusion 5 MICROgram(s)/kG/Min (12.8 mL/Hr) IV Continuous <Continuous>  FLUoxetine 20 milliGRAM(s) Oral <User Schedule>  heparin   Injectable 5000 Unit(s) SubCutaneous every 12 hours  latanoprost 0.005% Ophthalmic Solution 1 Drop(s) Both EYES at bedtime  melatonin 3 milliGRAM(s) Oral at bedtime  mirtazapine 15 milliGRAM(s) Oral at bedtime  nitroprusside Infusion 0.3 MICROgram(s)/kG/Min (3.83 mL/Hr) IV Continuous <Continuous>  pantoprazole    Tablet 40 milliGRAM(s) Oral before breakfast  tamsulosin 0.8 milliGRAM(s) Oral at bedtime    MEDICATIONS  (PRN):  LORazepam     Tablet 0.25 milliGRAM(s) Oral at bedtime PRN Anxiety  sodium chloride 0.9% lock flush 10 milliLiter(s) IV Push every 1 hour PRN Pre/post blood products, medications, blood draw, and to maintain line patency        ITEMS UNCHECKED ARE NOT PRESENT     PRESENT SYMPTOMS: [ ]Unable to self-report due to altered mental status  [ ] CPOT [ ] PAINADs [ ] RDOS  Source if other than patient:  [ ]Family   [ ]Team     Pain: [ ]yes [ x]no  QOL impact -   Location -                    Aggravating factors -  Quality -  Radiation -  Timing-  Severity (0-10 scale):  Minimal acceptable level / Pain goal (0-10 scale):     CPOT:    https://www.sccm.org/getattachment/wor12h72-7e9y-8a3g-5s1x-8992u9218o5r/Critical-Care-Pain-Observation-Tool-(CPOT)    Dyspnea:                           [x ]Mild [ ]Moderate [ ]Severe  Anxiety:                             [x ]Mild [ ]Moderate [ ]Severe  Agitation:                          [ ]Mild [ ]Moderate [ ]Severe  Fatigue:                             [ ]Mild [ ]Moderate [ ]Severe  Nausea:                             [ ]Mild [ ]Moderate [ ]Severe  Loss of appetite:              [ ]Mild [ ]Moderate [ ]Severe  Constipation:                   [ ]Mild [ ]Moderate [ ]Severe  Diarrhea:                          [ ]Mild [ ]Moderate [ ]Severe      PCSSQ[Palliative Care Spiritual Screening Question]   Severity (0-10):  Score of 4 or > indicate consideration of Chaplaincy referral.  Chaplaincy Referral: [ ] yes [ ] refused [ ] following [ x] deferred    Caregiver [place]? : [ x] yes [ ] no [ ] Declined   [ ] Deferred            Social work referral [ ] Patient & Family Centered Care Referral [ ]     Anticipatory Grief present?:  [ ] yes [ ] no  [x ] Deferred                  Social work referral [ ] Chaplaincy Referral[ ]    Other Symptoms:  [ x]All other review of systems negative   [ ] Unable to obtain due to poor mentation     PHYSICAL EXAM:  Vital Signs Last 24 Hrs  T(C): 36.5 (2024 05:00), Max: 36.8 (2024 17:27)  T(F): 97.7 (2024 05:00), Max: 98.3 (2024 17:27)  HR: 74 (2024 16:00) (64 - 89)  BP: 153/82 (2024 13:00) (73/47 - 153/82)  BP(mean): 98 (2024 13:00) (55 - 106)  RR: 16 (2024 16:00) (12 - 28)  SpO2: 98% (2024 16:00) (90% - 100%)    Parameters below as of 2024 07:00  Patient On (Oxygen Delivery Method): nasal cannula  O2 Flow (L/min): 4       I&O's Summary    2024 07:01  -  2024 07:00  --------------------------------------------------------  IN: 882.5 mL / OUT: 240 mL / NET: 642.5 mL    2024 07:01  -  2024 16:32  --------------------------------------------------------  IN: 309.3 mL / OUT: 200 mL / NET: 109.3 mL        GENERAL:  [x ]Alert  [x ]Oriented x  3 [ ]Lethargic  [ ]Cachexia  [ ]Unarousable  [ x]Verbal  [ ]Non-Verbal  [x ] No Distress  Behavioral:   [ ] Anxiety  [ ] Delirium [ ] Agitation [ ] Calm  [x ] Other- restless  HEENT:  [ x]Normal  [ ] Temporal Wasting  [ ]Dry mouth   [ ]ET Tube/Trach  [ ]Oral lesions  [ ] Mucositis  PULMONARY:   [ ]Clear [x ]Tachypnea mildly ; no accessory muscle use  [ ]Audible excessive secretions   [ ]Rhonchi        [ ]Right [ ]Left [ ]Bilateral  [ ]Crackles        [ ]Right [ ]Left [ ]Bilateral  [ ]Wheezing     [ ]Right [ ]Left [ ]Bilateral  [ ]Diminished breath sounds [ ]right [ ]left [ ]bilateral  CARDIOVASCULAR:    [x ]Regular [ ]Irregular [ ]Tachy  [ ]Christiano [ ]Murmur [ ]Other  GASTROINTESTINAL:  [ x]Soft  [ ]Distended   [ ]+BS  [ x]Non tender [ ]Tender  [ ]PEG [ ]OGT/ NGT  Last BM:   GENITOURINARY:  [x ]Normal [ ] Incontinent   [ ]Oliguria/Anuria   [ ]Chandler  MUSCULOSKELETAL:   [ ]Normal   [ ]Weakness  [ ]Bed/Wheelchair bound [x ]Edema in b/l lower extremities   [  ] contraction  NEUROLOGIC:   [ x]No focal deficits  [ ]Cognitive impairment  [ ]Dysphagia [ ]Dysarthria [ ]Paresis [ ]Other   SKIN: See Nursing Skin Assessment for further details  [x]Normal    [ ]Rash  [ ]Pressure ulcer(s)       Present on admission [ ]y [ ]n   [  ]  Wound    [  ] hyperpigmentation    CRITICAL CARE:  [ x] Shock Present  [ ]Septic [ x]Cardiogenic [ ]Neurologic [ ]Hypovolemic  [ ]  Vasopressors [x ]  Inotropes   [ ]Respiratory failure present [ ]Mechanical ventilation [ ]Non-invasive ventilatory support [ ]High flow    [ ]Acute  [ ]Chronic [ ]Hypoxic  [ ]Hypercarbic [ ]Other  [ ]Other organ failure     LABS:  reviewed                         10.7   5.52  )-----------( 151      ( 2024 01:05 )             32.2   07-    130<L>  |  89<L>  |  95<H>  ----------------------------<  83  3.8   |  13<L>  |  3.56<H>    Ca    8.8      2024 02:00  Phos  7.7       Mg     2.70         TPro  6.7  /  Alb  3.2<L>  /  TBili  3.4<H>  /  DBili  x   /  AST  76<H>  /  ALT  39  /  AlkPhos  363<H>      Urinalysis Basic - ( 2024 06:00 )    Color: Dark Yellow / Appearance: Cloudy / S.020 / pH: x  Gluc: x / Ketone: Trace mg/dL  / Bili: Small / Urobili: 1.0 mg/dL   Blood: x / Protein: 100 mg/dL / Nitrite: Negative   Leuk Esterase: Trace / RBC: 3 /HPF / WBC 10 /HPF   Sq Epi: x / Non Sq Epi: 11 /HPF / Bacteria: Negative /HPF      CAPILLARY BLOOD GLUCOSE          RADIOLOGY & ADDITIONAL STUDIES: reviewed     PROTEIN CALORIE MALNUTRITION PRESENT: [ ]mild [ ]moderate [ ]severe [ ]underweight [ ]morbid obesity  https://www.andeal.org/vault/2440/web/files/ONC/Table_Clinical%20Characteristics%20to%20Document%20Malnutrition-White%20JV%20et%20al%2020.pdf    Height (cm): 182.9 (24 @ 13:27), 182.9 (24 @ 12:30), 182.9 (24 @ 07:31)  Weight (kg): 85.2 (24 @ 18:00), 86.2 (24 @ 10:00), 84.4 (24 @ 07:31)  BMI (kg/m2): 25.5 (24 @ 18:00), 25.8 (24 @ 13:27), 25.8 (24 @ 10:00)    [ ]PPSV2 < or = to 30% [ ]significant weight loss  [ ]poor nutritional intake  [ ]anasarca [ ]Artificial Nutrition      REFERRALS:   [ ]Chaplaincy  [ ]Hospice  [ ]Child Life  [ ]Social Work  [x ]Case management [ ]Holistic Therapy

## 2024-07-02 NOTE — PROGRESS NOTE ADULT - PROBLEM SELECTOR PLAN 1
-Remains in cardiogenic shock and has severe hypervolemia. But more comfortable today, less tachypneic. Lactate is risking, 9.4. Central sat 42%, CI 1.54, CO 3.1.   -Please get standing weight. Strict I/O.   -Continue Dobutamine gtt 7.5 mcg/kg/min  -Continue Bumex gtt 2 mg/hr. s/p Diuril 500 mg x 1.  -Continue nipride.   -Keep K 4.0-5.0. and Mag >2.0.   -Strict I/O.   - Patient expressed his wishes to be DNR/DNI- wife present by bedside and agreed.  -Explained overall poor prognosis. Patient  and wife stated they understand.  -Dr. Landry discussed the above with patient, and his wife, and recommended a palliative care consult.

## 2024-07-02 NOTE — PROGRESS NOTE ADULT - SUBJECTIVE AND OBJECTIVE BOX
INTERVAL HPI/OVERNIGHT EVENTS: pt developed worsening cardiogenic shock overnight with dwindling urine output. Dobutamine increased to 7.5, Niprid started and bumex drip increased to 2/hr     SUBJECTIVE: Patient seen and examined at bedside. Pt seen and examined at bedside. Pt is awake and alert, denies any CP, SOB, palpitations, abd pain, n/v  He reconfirmed that he is DNR/DNI and does not want HD    ROS: All negative except as listed above.    VITAL SIGNS:  ICU Vital Signs Last 24 Hrs  T(C): 36.5 (2024 05:00), Max: 36.8 (2024 17:27)  T(F): 97.7 (2024 05:00), Max: 98.3 (2024 17:27)  HR: 67 (2024 08:00) (64 - 138)  BP: 88/53 (2024 07:00) (73/47 - 113/60)  BP(mean): 65 (2024 07:00) (55 - 88)  ABP: --  ABP(mean): --  RR: 26 (2024 08:00) (12 - 28)  SpO2: 93% (2024 08:00) (90% - 100%)    O2 Parameters below as of 2024 06:00  Patient On (Oxygen Delivery Method): nasal cannula  O2 Flow (L/min): 4       @ 07:01  -  07-02 @ 07:00  --------------------------------------------------------  IN: 835.5 mL / OUT: 220 mL / NET: 615.5 mL      CAPILLARY BLOOD GLUCOSE          ECG: reviewed.    PHYSICAL EXAM:    GENERAL: sitting in bed in NAD  HEAD:  Atraumatic, normocephalic  EYES: EOMI, PERRL  NECK: Supple, trachea midline, no JVD  HEART: Regular rate and rhythm  LUNGS: Unlabored respirations.  bibasilar crackles   ABDOMEN: Soft, nontender, nondistended  EXTREMITIES: cool with 2+ pitting LE edema   NERVOUS SYSTEM:  awake and alert, following commands, moving all extremities, no focal deficits     Lines: RIJ Cordis 7/1  R Radial A line 7/2      MEDICATIONS:  MEDICATIONS  (STANDING):  aMIOdarone Infusion 0.5 mG/Min (16.7 mL/Hr) IV Continuous <Continuous>  aMIOdarone Infusion 1 mG/Min (33.3 mL/Hr) IV Continuous <Continuous>  aspirin enteric coated 81 milliGRAM(s) Oral daily  atorvastatin 40 milliGRAM(s) Oral at bedtime  buMETAnide Infusion 2 mG/Hr (10 mL/Hr) IV Continuous <Continuous>  chlorhexidine 2% Cloths 1 Application(s) Topical <User Schedule>  chlorothiazide 500 milliGRAM(s) Oral once  chlorothiazide IVPB 500 milliGRAM(s) IV Intermittent once  clopidogrel Tablet 75 milliGRAM(s) Oral daily  DOBUTamine Infusion 7.5 MICROgram(s)/kG/Min (19.2 mL/Hr) IV Continuous <Continuous>  FLUoxetine 20 milliGRAM(s) Oral <User Schedule>  heparin   Injectable 5000 Unit(s) SubCutaneous every 12 hours  latanoprost 0.005% Ophthalmic Solution 1 Drop(s) Both EYES at bedtime  melatonin 3 milliGRAM(s) Oral at bedtime  mirtazapine 15 milliGRAM(s) Oral at bedtime  nitroprusside Infusion 0.3 MICROgram(s)/kG/Min (3.83 mL/Hr) IV Continuous <Continuous>  pantoprazole    Tablet 40 milliGRAM(s) Oral before breakfast  tamsulosin 0.8 milliGRAM(s) Oral at bedtime    MEDICATIONS  (PRN):  sodium chloride 0.9% lock flush 10 milliLiter(s) IV Push every 1 hour PRN Pre/post blood products, medications, blood draw, and to maintain line patency  zolpidem 5 milliGRAM(s) Oral at bedtime PRN Insomnia      ALLERGIES:  Allergies    No Known Allergies    Intolerances        LABS:                        10.7   5.52  )-----------( 151      ( 2024 01:05 )             32.2     07-02    130<L>  |  89<L>  |  95<H>  ----------------------------<  83  3.8   |  13<L>  |  3.56<H>    Ca    8.8      2024 02:00  Phos  7.7       Mg     2.70         TPro  6.7  /  Alb  3.2<L>  /  TBili  3.4<H>  /  DBili  x   /  AST  76<H>  /  ALT  39  /  AlkPhos  363<H>        Urinalysis Basic - ( 2024 06:00 )    Color: Dark Yellow / Appearance: Cloudy / S.020 / pH: x  Gluc: x / Ketone: Trace mg/dL  / Bili: Small / Urobili: 1.0 mg/dL   Blood: x / Protein: 100 mg/dL / Nitrite: Negative   Leuk Esterase: Trace / RBC: 3 /HPF / WBC 10 /HPF   Sq Epi: x / Non Sq Epi: 11 /HPF / Bacteria: Negative /HPF        vBG:  pH, Venous: 7.29 (24 @ 05:39)  pCO2, Venous: 31 mmHg (24 @ 05:39)  pO2, Venous: 37 mmHg (24 @ 05:39)  HCO3, Venous: 15 mmol/L (24 @ 05:39)  pH, Venous: 7.32 (24 @ 01:05)  pCO2, Venous: 31 mmHg (24 @ 01:05)  pO2, Venous: 31 mmHg (24 @ 01:05)  HCO3, Venous: 16 mmol/L (24 @ 01:05)  pH, Venous: 7.41 (24 @ 15:30)  pCO2, Venous: 34 mmHg (24 @ 15:30)  pO2, Venous: 33 mmHg (24 @ 15:30)  HCO3, Venous: 22 mmol/L (24 @ 15:30)    Micro:        RADIOLOGY & ADDITIONAL TESTS: Reviewed.   INTERVAL HPI/OVERNIGHT EVENTS: pt developed worsening cardiogenic shock overnight with dwindling urine output. Dobutamine increased to 7.5, Niprid started and bumex drip increased to 2/hr     SUBJECTIVE: Patient seen and examined at bedside. Pt seen and examined at bedside. Pt is awake and alert, denies any CP, SOB, palpitations, abd pain, n/v  He reconfirmed that he is DNR/DNI and does not want HD    ROS: All negative except as listed above.    VITAL SIGNS:  ICU Vital Signs Last 24 Hrs  T(C): 36.5 (2024 05:00), Max: 36.8 (2024 17:27)  T(F): 97.7 (2024 05:00), Max: 98.3 (2024 17:27)  HR: 67 (2024 08:00) (64 - 138)  BP: 88/53 (2024 07:00) (73/47 - 113/60)  BP(mean): 65 (2024 07:00) (55 - 88)  ABP: --  ABP(mean): --  RR: 26 (2024 08:00) (12 - 28)  SpO2: 93% (2024 08:00) (90% - 100%)    O2 Parameters below as of 2024 06:00  Patient On (Oxygen Delivery Method): nasal cannula  O2 Flow (L/min): 4       @ 07:01  -  07-02 @ 07:00  --------------------------------------------------------  IN: 835.5 mL / OUT: 220 mL / NET: 615.5 mL      CAPILLARY BLOOD GLUCOSE          ECG: reviewed.    PHYSICAL EXAM:    GENERAL: sitting in bed in NAD  HEAD:  Atraumatic, normocephalic  EYES: EOMI, PERRL  NECK: Supple, trachea midline, no JVD  HEART: Regular rate and rhythm  LUNGS: Unlabored respirations.  bibasilar crackles   ABDOMEN: Soft, nontender, nondistended  EXTREMITIES: cool with 2+ pitting LE edema   NERVOUS SYSTEM:  awake and alert, following commands, moving all extremities, no focal deficits     Lines: RIJ Cordis 7/1  R Radial A line 7/2      MEDICATIONS:  MEDICATIONS  (STANDING):  aMIOdarone Infusion 0.5 mG/Min (16.7 mL/Hr) IV Continuous <Continuous>  aMIOdarone Infusion 1 mG/Min (33.3 mL/Hr) IV Continuous <Continuous>  aspirin enteric coated 81 milliGRAM(s) Oral daily  atorvastatin 40 milliGRAM(s) Oral at bedtime  buMETAnide Infusion 2 mG/Hr (10 mL/Hr) IV Continuous <Continuous>  chlorhexidine 2% Cloths 1 Application(s) Topical <User Schedule>  chlorothiazide 500 milliGRAM(s) Oral once  chlorothiazide IVPB 500 milliGRAM(s) IV Intermittent once  clopidogrel Tablet 75 milliGRAM(s) Oral daily  DOBUTamine Infusion 7.5 MICROgram(s)/kG/Min (19.2 mL/Hr) IV Continuous <Continuous>  FLUoxetine 20 milliGRAM(s) Oral <User Schedule>  heparin   Injectable 5000 Unit(s) SubCutaneous every 12 hours  latanoprost 0.005% Ophthalmic Solution 1 Drop(s) Both EYES at bedtime  melatonin 3 milliGRAM(s) Oral at bedtime  mirtazapine 15 milliGRAM(s) Oral at bedtime  nitroprusside Infusion 0.3 MICROgram(s)/kG/Min (3.83 mL/Hr) IV Continuous <Continuous>  pantoprazole    Tablet 40 milliGRAM(s) Oral before breakfast  tamsulosin 0.8 milliGRAM(s) Oral at bedtime    MEDICATIONS  (PRN):  sodium chloride 0.9% lock flush 10 milliLiter(s) IV Push every 1 hour PRN Pre/post blood products, medications, blood draw, and to maintain line patency  zolpidem 5 milliGRAM(s) Oral at bedtime PRN Insomnia      ALLERGIES:  Allergies    No Known Allergies    Intolerances        LABS:                        10.7   5.52  )-----------( 151      ( 2024 01:05 )             32.2     07-02    130<L>  |  89<L>  |  95<H>  ----------------------------<  83  3.8   |  13<L>  |  3.56<H>    Ca    8.8      2024 02:00  Phos  7.7       Mg     2.70         TPro  6.7  /  Alb  3.2<L>  /  TBili  3.4<H>  /  DBili  x   /  AST  76<H>  /  ALT  39  /  AlkPhos  363<H>      Tele: some PVCs overnight. No further SVT episodes.     Urinalysis Basic - ( 2024 06:00 )    Color: Dark Yellow / Appearance: Cloudy / S.020 / pH: x  Gluc: x / Ketone: Trace mg/dL  / Bili: Small / Urobili: 1.0 mg/dL   Blood: x / Protein: 100 mg/dL / Nitrite: Negative   Leuk Esterase: Trace / RBC: 3 /HPF / WBC 10 /HPF   Sq Epi: x / Non Sq Epi: 11 /HPF / Bacteria: Negative /HPF        vBG:  pH, Venous: 7.29 (24 @ 05:39)  pCO2, Venous: 31 mmHg (24 @ 05:39)  pO2, Venous: 37 mmHg (24 @ 05:39)  HCO3, Venous: 15 mmol/L (24 @ 05:39)  pH, Venous: 7.32 (24 @ 01:05)  pCO2, Venous: 31 mmHg (24 @ 01:05)  pO2, Venous: 31 mmHg (24 @ 01:05)  HCO3, Venous: 16 mmol/L (24 @ 01:05)  pH, Venous: 7.41 (24 @ 15:30)  pCO2, Venous: 34 mmHg (24 @ 15:30)  pO2, Venous: 33 mmHg (24 @ 15:30)  HCO3, Venous: 22 mmol/L (24 @ 15:30)    Micro:        RADIOLOGY & ADDITIONAL TESTS: Reviewed.

## 2024-07-02 NOTE — CONSULT NOTE ADULT - PROBLEM SELECTOR RECOMMENDATION 6
Please provide information for outpatient palliative care office on discharge paperwork when stable for discharge. [address] [place] [address]. Phone Number: [phone].    Case reviewed with CCU team  Thank you for allowing us to participate in your patient's care. Please page [phone] for any questions/concerns.

## 2024-07-02 NOTE — PROGRESS NOTE ADULT - ASSESSMENT
This is a 79 year old male known to the [facility] HF clinic (Followed by Dr. Shell), with PMH of CAD with STEMI 5/20222 with cardiogenic shock and IABP placement,  but was not revascularized due to non viable myocardium, ICM (TTE 6/6/24 LVEF 20%, RV systolic dysfunction, moderate MR/TR), LV thrombus, prior lung adenocarcinoma with RUL VATS in 11/2022 s/p radiation therapy comes in with complaints of SOB, LE and hypotension. Of note- patient has been seen at [facility] 5 x this year for SOB, BRODY and was placed on Dobutamine gtt home infusion. Per patient's wife he was unable to take his diuretics due to hypotension at home for which in hopes of raising BP he was given a salt load with bullions. He continued to worsen, and patient's wife called [facility] HF clinic and he was advised to come to ED for immediate care. In ED patient is tachypenic, tachycardic with BP 60-130s (SVT), hypotensive with SBP 70-90s, O2 sat 70s, and was placed on BIPAP. Home Dobutamine gtt was held in ED due to SVT, but was restarted s/p HF consultation. Patient admits to SOB at rest, worsening LE edema and weakness. Labs show H/h11.5/33.5, BRODY BUN 97/3.09, na 134, Cl 92, Alk phos 425, AST 75, ALT 36, proBNP 69738, lactate 3.4. Overall stage D HF, NYHA class IV- is in cardiogenic shock with massive volume overload.

## 2024-07-02 NOTE — CONSULT NOTE ADULT - PROBLEM SELECTOR RECOMMENDATION 9
-Appears to be in cardiogenic shock with massive volume overload- hypotensive with SBP 70-90s, tachycardic, tachypnic, BRODY. Elevated Lactate 3.4.   -Recommend restarting home dobutamine gtt. When able to start hospital infusion- stop home infusion, and start Dobutamine gtt 5 mcg/kg/min.   -Start Bumex 4 mg IVPB x 1, followed by Bumex gtt 1 mg/hr.   -Transfer to CCU.   -Place Cordis- send central venous sat.   -Keep K 4.0-5.0. and Mag >2.0.   -Strict I/O.   - Patient expressed his wishes to be DNR/DNI- wife present by bedside and agreed.  -Explained overall poor prognosis. Patient  and wife stated they understand.
- TTE 6/6 -  1. Biatrial enlargement 2. Left ventricular ejection fraction, by visual estimation, is <20%.  3. Severely decreased global left ventricular systolic function.  4. Entire septum, entire apex, and mid anterior segment are abnormal as described above.  5. Increased LV wall thickness.  6. Mildly increased left ventricular internal cavity size.   7. Right ventricular volume and pressure overload. 8. There is mild concentric left ventricular hypertrophy.  9. Severely enlarged right ventricle.10. Severely reduced RV systolic function.11. Moderate mitral valve regurgitation.  12. Moderate-severe tricuspid regurgitation.13. Mild aortic regurgitation.14. Mild pulmonic valve regurgitation.  15. Estimated pulmonary artery systolic pressure is 44.6 mmHg assuming a right atrial pressure of 15 mmHg, which is consistent with mild pulmonary hypertension.16. LA volume Index is 38.5 ml/m² ml/m2.  - CCU and Heart Failure recommendations appreciated  - Per discussion with CCU team, patient in cardiogenic shock, currently on dobutamine gtt, nitroprusside gtt , and bumex gtt  - management as per primary team

## 2024-07-02 NOTE — CONSULT NOTE ADULT - PROBLEM SELECTOR RECOMMENDATION 5
- MOLST in chart reflects: DNR/DNI. No feeding tube. No HD.   - Patient and wife confirm above wishes for advanced directives. They share wish to continue with medical optimization. They defer hospice referral at this time, but are interested in palliative care followup ; provided information about outpatient [facility] for outpatient palliative follow up.   See GOC note

## 2024-07-03 DIAGNOSIS — N17.9 ACUTE KIDNEY FAILURE, UNSPECIFIED: ICD-10-CM

## 2024-07-03 LAB
ALBUMIN SERPL ELPH-MCNC: 3.1 G/DL — LOW (ref 3.3–5)
ALBUMIN SERPL ELPH-MCNC: 3.1 G/DL — LOW (ref 3.3–5)
ALP SERPL-CCNC: 339 U/L — HIGH (ref 40–120)
ALP SERPL-CCNC: 356 U/L — HIGH (ref 40–120)
ALT FLD-CCNC: 81 U/L — HIGH (ref 4–41)
ALT FLD-CCNC: 84 U/L — HIGH (ref 4–41)
ANION GAP SERPL CALC-SCNC: 21 MMOL/L — HIGH (ref 7–14)
ANION GAP SERPL CALC-SCNC: 24 MMOL/L — HIGH (ref 7–14)
AST SERPL-CCNC: 184 U/L — HIGH (ref 4–40)
AST SERPL-CCNC: 197 U/L — HIGH (ref 4–40)
BASE EXCESS BLDV CALC-SCNC: -2.5 MMOL/L — LOW (ref -2–3)
BASE EXCESS BLDV CALC-SCNC: -3.3 MMOL/L — LOW (ref -2–3)
BASE EXCESS BLDV CALC-SCNC: -3.4 MMOL/L — LOW (ref -2–3)
BILIRUB SERPL-MCNC: 3.2 MG/DL — HIGH (ref 0.2–1.2)
BILIRUB SERPL-MCNC: 3.6 MG/DL — HIGH (ref 0.2–1.2)
BLOOD GAS VENOUS COMPREHENSIVE RESULT: SIGNIFICANT CHANGE UP
BUN SERPL-MCNC: 106 MG/DL — HIGH (ref 7–23)
BUN SERPL-MCNC: 110 MG/DL — HIGH (ref 7–23)
CALCIUM SERPL-MCNC: 7.9 MG/DL — LOW (ref 8.4–10.5)
CALCIUM SERPL-MCNC: 8.5 MG/DL — SIGNIFICANT CHANGE UP (ref 8.4–10.5)
CHLORIDE BLDV-SCNC: 90 MMOL/L — LOW (ref 96–108)
CHLORIDE BLDV-SCNC: 91 MMOL/L — LOW (ref 96–108)
CHLORIDE BLDV-SCNC: 92 MMOL/L — LOW (ref 96–108)
CHLORIDE SERPL-SCNC: 89 MMOL/L — LOW (ref 98–107)
CHLORIDE SERPL-SCNC: 90 MMOL/L — LOW (ref 98–107)
CO2 BLDV-SCNC: 22.4 MMOL/L — SIGNIFICANT CHANGE UP (ref 22–26)
CO2 BLDV-SCNC: 23.3 MMOL/L — SIGNIFICANT CHANGE UP (ref 22–26)
CO2 BLDV-SCNC: 23.7 MMOL/L — SIGNIFICANT CHANGE UP (ref 22–26)
CO2 SERPL-SCNC: 16 MMOL/L — LOW (ref 22–31)
CO2 SERPL-SCNC: 20 MMOL/L — LOW (ref 22–31)
CREAT SERPL-MCNC: 3.58 MG/DL — HIGH (ref 0.5–1.3)
CREAT SERPL-MCNC: 3.83 MG/DL — HIGH (ref 0.5–1.3)
EGFR: 15 ML/MIN/1.73M2 — LOW
EGFR: 17 ML/MIN/1.73M2 — LOW
FLUAV AG NPH QL: SIGNIFICANT CHANGE UP
FLUBV AG NPH QL: SIGNIFICANT CHANGE UP
GAS PNL BLDV: 125 MMOL/L — LOW (ref 136–145)
GAS PNL BLDV: 126 MMOL/L — LOW (ref 136–145)
GAS PNL BLDV: 127 MMOL/L — LOW (ref 136–145)
GAS PNL BLDV: SIGNIFICANT CHANGE UP
GLUCOSE BLDV-MCNC: 105 MG/DL — HIGH (ref 70–99)
GLUCOSE BLDV-MCNC: 126 MG/DL — HIGH (ref 70–99)
GLUCOSE BLDV-MCNC: 131 MG/DL — HIGH (ref 70–99)
GLUCOSE SERPL-MCNC: 116 MG/DL — HIGH (ref 70–99)
GLUCOSE SERPL-MCNC: 151 MG/DL — HIGH (ref 70–99)
HCO3 BLDV-SCNC: 21 MMOL/L — LOW (ref 22–29)
HCO3 BLDV-SCNC: 22 MMOL/L — SIGNIFICANT CHANGE UP (ref 22–29)
HCO3 BLDV-SCNC: 22 MMOL/L — SIGNIFICANT CHANGE UP (ref 22–29)
HCT VFR BLD CALC: 28.9 % — LOW (ref 39–50)
HCT VFR BLD CALC: 30.1 % — LOW (ref 39–50)
HCT VFR BLDA CALC: 29 % — LOW (ref 39–51)
HCT VFR BLDA CALC: 32 % — LOW (ref 39–51)
HCT VFR BLDA CALC: 39 % — SIGNIFICANT CHANGE UP (ref 39–51)
HGB BLD CALC-MCNC: 10.6 G/DL — LOW (ref 12.6–17.4)
HGB BLD CALC-MCNC: 13 G/DL — SIGNIFICANT CHANGE UP (ref 12.6–17.4)
HGB BLD CALC-MCNC: 9.8 G/DL — LOW (ref 12.6–17.4)
HGB BLD-MCNC: 10.2 G/DL — LOW (ref 13–17)
HGB BLD-MCNC: 9.5 G/DL — LOW (ref 13–17)
LACTATE BLDV-MCNC: 1.6 MMOL/L — SIGNIFICANT CHANGE UP (ref 0.5–2)
LACTATE BLDV-MCNC: 2.2 MMOL/L — HIGH (ref 0.5–2)
LACTATE BLDV-MCNC: 2.5 MMOL/L — HIGH (ref 0.5–2)
MAGNESIUM SERPL-MCNC: 2.6 MG/DL — SIGNIFICANT CHANGE UP (ref 1.6–2.6)
MCHC RBC-ENTMCNC: 28.7 PG — SIGNIFICANT CHANGE UP (ref 27–34)
MCHC RBC-ENTMCNC: 29.2 PG — SIGNIFICANT CHANGE UP (ref 27–34)
MCHC RBC-ENTMCNC: 32.9 GM/DL — SIGNIFICANT CHANGE UP (ref 32–36)
MCHC RBC-ENTMCNC: 33.9 GM/DL — SIGNIFICANT CHANGE UP (ref 32–36)
MCV RBC AUTO: 86.2 FL — SIGNIFICANT CHANGE UP (ref 80–100)
MCV RBC AUTO: 87.3 FL — SIGNIFICANT CHANGE UP (ref 80–100)
NRBC # BLD: 0 /100 WBCS — SIGNIFICANT CHANGE UP (ref 0–0)
NRBC # BLD: 0 /100 WBCS — SIGNIFICANT CHANGE UP (ref 0–0)
NRBC # FLD: 0.03 K/UL — HIGH (ref 0–0)
NRBC # FLD: 0.05 K/UL — HIGH (ref 0–0)
PCO2 BLDV: 36 MMHG — LOW (ref 42–55)
PCO2 BLDV: 39 MMHG — LOW (ref 42–55)
PCO2 BLDV: 40 MMHG — LOW (ref 42–55)
PH BLDV: 7.35 — SIGNIFICANT CHANGE UP (ref 7.32–7.43)
PH BLDV: 7.37 — SIGNIFICANT CHANGE UP (ref 7.32–7.43)
PH BLDV: 7.38 — SIGNIFICANT CHANGE UP (ref 7.32–7.43)
PHOSPHATE SERPL-MCNC: 7.3 MG/DL — HIGH (ref 2.5–4.5)
PLATELET # BLD AUTO: 117 K/UL — LOW (ref 150–400)
PLATELET # BLD AUTO: 134 K/UL — LOW (ref 150–400)
PO2 BLDV: 35 MMHG — SIGNIFICANT CHANGE UP (ref 25–45)
POTASSIUM BLDV-SCNC: 3.1 MMOL/L — LOW (ref 3.5–5.1)
POTASSIUM BLDV-SCNC: 3.3 MMOL/L — LOW (ref 3.5–5.1)
POTASSIUM BLDV-SCNC: 3.6 MMOL/L — SIGNIFICANT CHANGE UP (ref 3.5–5.1)
POTASSIUM SERPL-MCNC: 3.2 MMOL/L — LOW (ref 3.5–5.3)
POTASSIUM SERPL-MCNC: 3.3 MMOL/L — LOW (ref 3.5–5.3)
POTASSIUM SERPL-SCNC: 3.2 MMOL/L — LOW (ref 3.5–5.3)
POTASSIUM SERPL-SCNC: 3.3 MMOL/L — LOW (ref 3.5–5.3)
PROT SERPL-MCNC: 6.5 G/DL — SIGNIFICANT CHANGE UP (ref 6–8.3)
PROT SERPL-MCNC: 6.5 G/DL — SIGNIFICANT CHANGE UP (ref 6–8.3)
RBC # BLD: 3.31 M/UL — LOW (ref 4.2–5.8)
RBC # BLD: 3.49 M/UL — LOW (ref 4.2–5.8)
RBC # FLD: 17 % — HIGH (ref 10.3–14.5)
RBC # FLD: 17.1 % — HIGH (ref 10.3–14.5)
RSV RNA NPH QL NAA+NON-PROBE: SIGNIFICANT CHANGE UP
SAO2 % BLDV: 49 % — LOW (ref 67–88)
SAO2 % BLDV: 51.5 % — LOW (ref 67–88)
SAO2 % BLDV: 52.4 % — LOW (ref 67–88)
SARS-COV-2 RNA SPEC QL NAA+PROBE: SIGNIFICANT CHANGE UP
SODIUM SERPL-SCNC: 130 MMOL/L — LOW (ref 135–145)
SODIUM SERPL-SCNC: 130 MMOL/L — LOW (ref 135–145)
WBC # BLD: 6.46 K/UL — SIGNIFICANT CHANGE UP (ref 3.8–10.5)
WBC # BLD: 6.98 K/UL — SIGNIFICANT CHANGE UP (ref 3.8–10.5)
WBC # FLD AUTO: 6.46 K/UL — SIGNIFICANT CHANGE UP (ref 3.8–10.5)
WBC # FLD AUTO: 6.98 K/UL — SIGNIFICANT CHANGE UP (ref 3.8–10.5)

## 2024-07-03 PROCEDURE — G0316 PROLONG INPT EVAL ADD15 M: CPT

## 2024-07-03 PROCEDURE — 99233 SBSQ HOSP IP/OBS HIGH 50: CPT

## 2024-07-03 PROCEDURE — 71045 X-RAY EXAM CHEST 1 VIEW: CPT | Mod: 26

## 2024-07-03 PROCEDURE — 99291 CRITICAL CARE FIRST HOUR: CPT

## 2024-07-03 RX ORDER — POTASSIUM CHLORIDE 600 MG/1
40 TABLET, FILM COATED, EXTENDED RELEASE ORAL ONCE
Refills: 0 | Status: COMPLETED | OUTPATIENT
Start: 2024-07-03 | End: 2024-07-03

## 2024-07-03 RX ORDER — DOBUTAMINE HCL 250MG/20ML
7.5 VIAL (ML) INTRAVENOUS
Qty: 500 | Refills: 0 | Status: DISCONTINUED | OUTPATIENT
Start: 2024-07-03 | End: 2024-07-11

## 2024-07-03 RX ORDER — CHLOROTHIAZIDE 500 MG
500 TABLET ORAL
Refills: 0 | Status: COMPLETED | OUTPATIENT
Start: 2024-07-03 | End: 2024-07-04

## 2024-07-03 RX ORDER — ACETAMINOPHEN 325 MG
325 TABLET ORAL EVERY 4 HOURS
Refills: 0 | Status: DISCONTINUED | OUTPATIENT
Start: 2024-07-03 | End: 2024-07-11

## 2024-07-03 RX ORDER — POTASSIUM CHLORIDE 600 MG/1
20 TABLET, FILM COATED, EXTENDED RELEASE ORAL ONCE
Refills: 0 | Status: COMPLETED | OUTPATIENT
Start: 2024-07-03 | End: 2024-07-03

## 2024-07-03 RX ORDER — SODIUM NITROPRUSSIDE 0.2 MG/ML
0.3 INJECTION, SOLUTION INTRAVENOUS
Qty: 100 | Refills: 0 | Status: DISCONTINUED | OUTPATIENT
Start: 2024-07-03 | End: 2024-07-04

## 2024-07-03 RX ORDER — SODIUM CHLORIDE 3 G/100ML
150 INJECTION, SOLUTION INTRAVENOUS ONCE
Refills: 0 | Status: COMPLETED | OUTPATIENT
Start: 2024-07-03 | End: 2024-07-03

## 2024-07-03 RX ORDER — ACETAZOLAMIDE 125 MG/1
250 TABLET ORAL ONCE
Refills: 0 | Status: COMPLETED | OUTPATIENT
Start: 2024-07-03 | End: 2024-07-03

## 2024-07-03 RX ORDER — POTASSIUM CHLORIDE 600 MG/1
10 TABLET, FILM COATED, EXTENDED RELEASE ORAL
Refills: 0 | Status: COMPLETED | OUTPATIENT
Start: 2024-07-03 | End: 2024-07-03

## 2024-07-03 RX ADMIN — MIRTAZAPINE 15 MILLIGRAM(S): 15 TABLET, FILM COATED ORAL at 21:10

## 2024-07-03 RX ADMIN — ATORVASTATIN CALCIUM 40 MILLIGRAM(S): 20 TABLET, FILM COATED ORAL at 21:11

## 2024-07-03 RX ADMIN — Medication 200 MILLIGRAM(S): at 17:05

## 2024-07-03 RX ADMIN — ASPIRIN 81 MILLIGRAM(S): 325 TABLET, FILM COATED ORAL at 11:54

## 2024-07-03 RX ADMIN — LATANOPROST PF 1 DROP(S): 0.05 SOLUTION/ DROPS OPHTHALMIC at 21:11

## 2024-07-03 RX ADMIN — SODIUM NITROPRUSSIDE 3.83 MICROGRAM(S)/KG/MIN: 0.2 INJECTION, SOLUTION INTRAVENOUS at 19:09

## 2024-07-03 RX ADMIN — PANTOPRAZOLE SODIUM 40 MILLIGRAM(S): 40 INJECTION, POWDER, FOR SOLUTION INTRAVENOUS at 10:10

## 2024-07-03 RX ADMIN — POTASSIUM CHLORIDE 100 MILLIEQUIVALENT(S): 600 TABLET, FILM COATED, EXTENDED RELEASE ORAL at 08:30

## 2024-07-03 RX ADMIN — SODIUM CHLORIDE 300 MILLILITER(S): 3 INJECTION, SOLUTION INTRAVENOUS at 11:54

## 2024-07-03 RX ADMIN — TAMSULOSIN HYDROCHLORIDE 0.8 MILLIGRAM(S): 0.4 CAPSULE ORAL at 21:10

## 2024-07-03 RX ADMIN — Medication 0.25 MILLIGRAM(S): at 21:10

## 2024-07-03 RX ADMIN — HYDRALAZINE HYDROCHLORIDE 10 MILLIGRAM(S): 50 TABLET ORAL at 21:33

## 2024-07-03 RX ADMIN — POTASSIUM CHLORIDE 100 MILLIEQUIVALENT(S): 600 TABLET, FILM COATED, EXTENDED RELEASE ORAL at 06:52

## 2024-07-03 RX ADMIN — HYDRALAZINE HYDROCHLORIDE 10 MILLIGRAM(S): 50 TABLET ORAL at 05:01

## 2024-07-03 RX ADMIN — ACETAZOLAMIDE 250 MILLIGRAM(S): 125 TABLET ORAL at 12:02

## 2024-07-03 RX ADMIN — POTASSIUM CHLORIDE 40 MILLIEQUIVALENT(S): 600 TABLET, FILM COATED, EXTENDED RELEASE ORAL at 18:12

## 2024-07-03 RX ADMIN — Medication 19.2 MICROGRAM(S)/KG/MIN: at 19:08

## 2024-07-03 RX ADMIN — HEPARIN SODIUM 5000 UNIT(S): 50 INJECTION, SOLUTION INTRAVENOUS at 17:05

## 2024-07-03 RX ADMIN — HEPARIN SODIUM 5000 UNIT(S): 50 INJECTION, SOLUTION INTRAVENOUS at 05:01

## 2024-07-03 RX ADMIN — Medication 19.2 MICROGRAM(S)/KG/MIN: at 11:55

## 2024-07-03 RX ADMIN — POTASSIUM CHLORIDE 20 MILLIEQUIVALENT(S): 600 TABLET, FILM COATED, EXTENDED RELEASE ORAL at 21:10

## 2024-07-03 RX ADMIN — Medication 3 MILLIGRAM(S): at 21:10

## 2024-07-03 RX ADMIN — BUMETANIDE 10 MG/HR: 0.25 INJECTION INTRAMUSCULAR; INTRAVENOUS at 19:09

## 2024-07-03 RX ADMIN — POTASSIUM CHLORIDE 100 MILLIEQUIVALENT(S): 600 TABLET, FILM COATED, EXTENDED RELEASE ORAL at 05:02

## 2024-07-03 RX ADMIN — CLOPIDOGREL BISULFATE 75 MILLIGRAM(S): 75 TABLET, FILM COATED ORAL at 11:54

## 2024-07-03 RX ADMIN — Medication 1 APPLICATION(S): at 06:52

## 2024-07-03 NOTE — PROGRESS NOTE ADULT - ASSESSMENT
79 year old male known to the [facility] HF clinic (Followed by Dr. Shell), with PMH of CAD with STEMI  with cardiogenic shock and IABP placement,  but was not revascularized due to non viable myocardium, ICM (TTE 24 LVEF 20%, RV systolic dysfunction, moderate MR/TR), LV thrombus, prior lung adenocarcinoma with RUL VATS in 2022 s/p radiation therapy comes in with complaints of SOB, LE and hypotension. Of note- patient has been seen at [facility] 5 x this year for SOB, BRODY and was placed on Dobutamine gtt home infusion. Per patient's wife he was unable to take his diuretics due to hypotension at home for which in hopes of raising BP he was given a salt load with bullions. He continued to worsen, and patient's wife called [facility] HF clinic and he was advised to come to ED for immediate care. In ED patient was found in decompensated HF/ cardiogenic shock, tachypneic tachycardic ( SVT) 130s , hypotensive with SBP 70-90s, and hypoxemic. Pt started on bipap, swan placed, pt started on ionotropes and admitted to CCU    Neuro  # bipolar disorder/depression/anxiety   - AxOX3  - intermittent lethargy likely from shock state  - cont home psych meds : FLUoxetine, mirtazapine     Resp  Hypoxic Resp Failure  - likely from pulm edema i/s of cardiogenic shock  -weaned from NIV to 4LNC   - now breathing comfortably. titrate for sat>92%    # history of lung cancer s/p RUL lobectomy (2022) with recurrent disease s/p radiation to left lung (completed 3/2023),   -Stage IV lung carcinoma, s/p local RT, never on systemic chemotherapy        Cardiac  #  chronic systolic congestive heart failure with cardiogenic shock   - TTE  EF 20% with RV systolic dysfx and moderate MR  -  AM numbers: CVP 26, CO3.1, CI 1.5, SVR 1282, SVO2 42% this AM  - latcate rise to 9 on    -  increased to 7.5 and bumex drip increased to 2mg/hr and given diuril for oliguria on   -  decrease  back to 5/hr and start Nitroprusside for afterload reduction on   - Swapnil from Central venous sat :CVP 25 CO 4 CI 1.9  CVO2 52.4 Lact 2.5 on  5/Bumex 2,   -  started on hydralazine and nipride drip d/c on 7/3  - strict I/o: IN: 1650.2 mL / OUT: 2180 mL / NET: -529.8 mL        # coronary artery disease   - LHC on 2022 showed RCA  and significant LAD and diagonal lesions-but was not revascularized due to non viable myocardium  - c/w Aspirin an Plavix and Lipitor    #SVT  - SVT at 130 in ED  - likely in setting of cardiogenic shock  -pt recieved amio and on Amio drip now. Now that pt's BP has normalized, no further episodes of SVT.  Amio d/c on      GI  - abd soft  - DASH diet now pt comfortable off NIV  -cont protonix for GI ppx        # BRODY on CKD  - Baseline creatinine: 2  -worsening crn likely from cardiogenic shock   - inc bumex drip and stat dose of diuril for oilguria and volume overload on   - worsening Screat 3  - monitor UO   -avoid nephrotoxins  - pt does not want HD if needed      Heme:  -cbc stable   -HSQ for DVT ppx    Endo:   -A1c 6.2  -TSH on  wnl   -maintain gluc<180 on bmp    #Ethics:  -reconfirmed with pt and wife at bedside that pt is DNR/DNI and doesnt want HD. We explained that we are trying our best to optimize his cardiogenic shock but his prognosis is poor with his worsening lactic acidosis and renal failure. 79 year old male known to the [facility] HF clinic (Followed by Dr. Shell), with PMH of CAD with STEMI  with cardiogenic shock and IABP placement,  but was not revascularized due to non viable myocardium, ICM (TTE 24 LVEF 20%, RV systolic dysfunction, moderate MR/TR), LV thrombus, prior lung adenocarcinoma with RUL VATS in 2022 s/p radiation therapy comes in with complaints of SOB, LE and hypotension. Of note- patient has been seen at [facility] 5 x this year for SOB, BRODY and was placed on Dobutamine gtt home infusion. Per patient's wife he was unable to take his diuretics due to hypotension at home for which in hopes of raising BP he was given a salt load with bullions. He continued to worsen, and patient's wife called [facility] HF clinic and he was advised to come to ED for immediate care. In ED patient was found in decompensated HF/ cardiogenic shock, tachypneic tachycardic ( SVT) 130s , hypotensive with SBP 70-90s, and hypoxemic. Pt started on bipap, swan placed, pt started on ionotropes and admitted to CCU    Neuro  # bipolar disorder/depression/anxiety   - AxOX3  - intermittent lethargy likely from shock state  - cont home psych meds : FLUoxetine, mirtazapine     Resp  Hypoxic Resp Failure  - likely from pulm edema i/s of cardiogenic shock  -weaned from NIV to 4LNC   - now breathing comfortably. titrate for sat>92%    # history of lung cancer s/p RUL lobectomy (2022) with recurrent disease s/p radiation to left lung (completed 3/2023),   -Stage IV lung carcinoma, s/p local RT, never on systemic chemotherapy        Cardiac  #  chronic systolic congestive heart failure with cardiogenic shock   - TTE  EF 20% with RV systolic dysfx and moderate MR  -  AM numbers: CVP 26, CO3.1, CI 1.5, SVR 1282, SVO2 42% this AM  - lactate rise to 9 on   -  increased to 7.5 and bumex drip increased to 2mg/hr and given diuril for oliguria on   -  decrease  back to 5/hr and start Nitroprusside for afterload reduction on   -  started on hydralazine  - lactate today 2.5  - VOL and Low flow status on exam  - Swapnil from Central venous sat 7/3  :CVP 25 CO 4 CI 1.9  CVO2 52.4 Lact 2.5 on  5/Bumex 2,   - strict I/o: IN: 1650.2 mL / OUT: 2180 mL / NET: -529.8 mL        # coronary artery disease   - LHC on 2022 showed RCA  and significant LAD and diagonal lesions-but was not revascularized due to non viable myocardium  - c/w Aspirin an Plavix and Lipitor    #SVT  - SVT at 130 in ED  - likely in setting of cardiogenic shock  -pt recieved amio and on Amio drip now. Now that pt's BP has normalized, no further episodes of SVT.  Amio d/c on      GI  #Transaminitis   - iso of VOL  - abdomen firm  - DASH diet now pt comfortable off NIV  -cont protonix for GI ppx  -            # BRODY on CKD iso cardiorenal  - Baseline creatinine: 2  -worsening crn likely from cardiogenic shock   - inc bumex drip and stat dose of diuril for oilguria and volume overload on   - worsening Screat, today  3  - monitor UO   -avoid nephrotoxins  - pt does not want HD if needed      Heme:  -cbc stable   -HSQ for DVT ppx    Endo:   -A1c 6.2  -TSH on  wnl   -maintain gluc<180 on bmp    #Ethics:  -reconfirmed with pt and wife at bedside that pt is DNR/DNI and doesnt want HD. We explained that we are trying our best to optimize his cardiogenic shock but his prognosis is poor with his worsening lactic acidosis and renal failure. Pt refuse hospice care 79 year old male known to the [facility] HF clinic (Followed by Dr. Shell), with PMH of CAD with STEMI  with cardiogenic shock and IABP placement,  but was not revascularized due to non viable myocardium, ICM (TTE 24 LVEF 20%, RV systolic dysfunction, moderate MR/TR), LV thrombus, prior lung adenocarcinoma with RUL VATS in 2022 s/p radiation therapy comes in with complaints of SOB, LE and hypotension. Of note- patient has been seen at [facility] 5 x this year for SOB, BRODY and was placed on Dobutamine gtt home infusion. Per patient's wife he was unable to take his diuretics due to hypotension at home for which in hopes of raising BP he was given a salt load with bullions. He continued to worsen, and patient's wife called [facility] HF clinic and he was advised to come to ED for immediate care. In ED patient was found in decompensated HF/ cardiogenic shock, tachypneic tachycardic ( SVT) 130s , hypotensive with SBP 70-90s, and hypoxemic. Pt started on bipap, swan placed, pt started on ionotropes and admitted to CCU    Neuro  # bipolar disorder/depression/anxiety   - AxOX3  - intermittent lethargy likely from shock state  - cont home psych meds : FLUoxetine, mirtazapine     Resp  Hypoxic Resp Failure  - likely from pulm edema i/s of cardiogenic shock  -weaned from NIV to 4LNC   - now breathing comfortably. titrate for sat>92%    # history of lung cancer s/p RUL lobectomy (2022) with recurrent disease s/p radiation to left lung (completed 3/2023),   -Stage IV lung carcinoma, s/p local RT, never on systemic chemotherapy        Cardiac  #  chronic systolic congestive heart failure with cardiogenic shock   - TTE  EF 20% with RV systolic dysfx and moderate MR  -  AM numbers: CVP 26, CO3.1, CI 1.5, SVR 1282, SVO2 42% this AM  - lactate rise to 9 on   -  increased to 7.5 and bumex drip increased to 2mg/hr and given diuril for oliguria on   -  decrease  back to 5/hr and start Nitroprusside for afterload reduction on   -  started on hydralazine  - lactate today 2.5  - VOL and Low flow status on exam  - Swapnil from Central venous sat 7/3  :CVP 25 CO 4 CI 1.9  CVO2 52.4 Lact 2.5 on  5/Bumex 2,   - strict I/o: IN: 1650.2 mL / OUT: 2180 mL / NET: -529.8 mL  - Diuril 500mg IV x2, hypertonic 3%   x1, Diamox IV 500mg x1 today      # coronary artery disease   - LHC on 2022 showed RCA  and significant LAD and diagonal lesions-but was not revascularized due to non viable myocardium  - c/w Aspirin an Plavix and Lipitor    #SVT  - SVT at 130 in ED  - likely in setting of cardiogenic shock  -pt recieved amio and on Amio drip now. Now that pt's BP has normalized, no further episodes of SVT.  Amio d/c on      GI  #Transaminitis   - iso of VOL  - abdomen firm  - DASH diet now pt comfortable off NIV  -cont protonix for GI ppx  -            # BRODY on CKD iso cardiorenal  - Baseline creatinine: 2  -worsening crn likely from cardiogenic shock   - inc bumex drip and stat dose of diuril for oilguria and volume overload on   - worsening S creat, today  3  - monitor UO   -avoid nephrotoxins  - pt does not want HD if needed      Heme:  -cbc stable   -HSQ for DVT ppx    Endo:   -A1c 6.2  -TSH on  wnl   -maintain gluc<180 on bmp    #Ethics:  -reconfirmed with pt and wife at bedside that pt is DNR/DNI and doesnt want HD. We explained that we are trying our best to optimize his cardiogenic shock but his prognosis is poor with his worsening lactic acidosis and renal failure. Pt refuse hospice care 79 year old male known to the [facility] HF clinic (Followed by Dr. Shell), with PMH of CAD with STEMI  with cardiogenic shock and IABP placement,  but was not revascularized due to non viable myocardium, ICM (TTE 24 LVEF 20%, RV systolic dysfunction, moderate MR/TR), LV thrombus, prior lung adenocarcinoma with RUL VATS in 2022 s/p radiation therapy comes in with complaints of SOB, LE and hypotension. Of note- patient has been seen at [facility] 5 x this year for SOB, BRODY and was placed on Dobutamine gtt home infusion. Per patient's wife he was unable to take his diuretics due to hypotension at home for which in hopes of raising BP he was given a salt load with bullions. He continued to worsen, and patient's wife called [facility] HF clinic and he was advised to come to ED for immediate care. In ED patient was found in decompensated HF/ cardiogenic shock, tachypneic tachycardic ( SVT) 130s , hypotensive with SBP 70-90s, and hypoxemic. Pt started on bipap, swan placed, pt started on ionotropes and admitted to CCU    Neuro  # bipolar disorder/depression/anxiety   - AxOX3  - intermittent lethargy likely from shock state  - cont home psych meds : FLUoxetine, mirtazapine     Resp  Hypoxic Resp Failure  - likely from pulm edema i/s of cardiogenic shock  -weaned from NIV to 4LNC   - now breathing comfortably. titrate for sat>92%    # history of lung cancer s/p RUL lobectomy (2022) with recurrent disease s/p radiation to left lung (completed 3/2023),   -Stage IV lung carcinoma, s/p local RT, never on systemic chemotherapy        Cardiac  #  chronic systolic congestive heart failure with cardiogenic shock   - TTE  EF 20% with RV systolic dysfx and moderate MR  -  AM numbers: CVP 26, CO3.1, CI 1.5, SVR 1282, SVO2 42% this AM  - lactate rise to 9 on   -  increased to 7.5 and bumex drip increased to 2mg/hr and given diuril for oliguria on   -  decrease  back to 5/hr and start Nitroprusside for afterload reduction on   -  started on hydralazine  - lactate today 2.5  - VOL and Low flow status on exam  - Swapnil from Central venous sat 7/3  :CVP 25 CO 4 CI 1.9  CVO2 52.4 Lact 2.5 on  5/Bumex 2,   - strict I/o: IN: 1650.2 mL / OUT: 2180 mL / NET: -529.8 mL  - Diuril 500mg IV x2, hypertonic 3%   x1, Diamox IV 500mg x1 today      # coronary artery disease   - LHC on 2022 showed RCA  and significant LAD and diagonal lesions-but was not revascularized due to non viable myocardium  - c/w Aspirin an Plavix and Lipitor    #SVT  - SVT at 130 in ED  - likely in setting of cardiogenic shock  -pt recieved amio and on Amio drip now. Now that pt's BP has normalized, no further episodes of SVT.  Amio d/c on      GI  #Transaminitis   - iso of VOL  - abdomen firm  - DASH diet now pt comfortable off NIV  -cont protonix for GI ppx  -            # BRODY on CKD iso cardiorenal  - Baseline creatinine: 2  -worsening crn likely from cardiogenic shock   - inc bumex drip and stat dose of diuril for oilguria and volume overload on   - worsening S creat, today  3  - monitor UO   -avoid nephrotoxins  - pt does not want HD if needed      Heme:  -cbc stable   -HSQ for DVT ppx    Endo:   -A1c 6.2  -TSH on  wnl   -maintain gluc<180 on bmp    LIne:   JENNIE cordis on    Left arm double lumen PIcc line with no blood return->  placed on  at [facility]    Left upper extremity PICC with tip in the brachycephalic vein on Cxr        #Ethics:  -reconfirmed with pt and wife at bedside that pt is DNR/DNI and doesnt want HD. We explained that we are trying our best to optimize his cardiogenic shock but his prognosis is poor with his worsening lactic acidosis and renal failure. Pt refuse hospice care

## 2024-07-03 NOTE — PROGRESS NOTE ADULT - SUBJECTIVE AND OBJECTIVE BOX
Interval History:  feels better  renal function still impaired with some response to diuretics  CI remains low     Medications:  acetaminophen     Tablet .. 325 milliGRAM(s) Oral every 4 hours PRN  aspirin enteric coated 81 milliGRAM(s) Oral daily  atorvastatin 40 milliGRAM(s) Oral at bedtime  buMETAnide Infusion 2 mG/Hr IV Continuous <Continuous>  chlorhexidine 2% Cloths 1 Application(s) Topical <User Schedule>  chlorothiazide IVPB 500 milliGRAM(s) IV Intermittent two times a day  clopidogrel Tablet 75 milliGRAM(s) Oral daily  DOBUTamine Infusion 7.5 MICROgram(s)/kG/Min IV Continuous <Continuous>  FLUoxetine 20 milliGRAM(s) Oral <User Schedule>  heparin   Injectable 5000 Unit(s) SubCutaneous every 12 hours  hydrALAZINE 10 milliGRAM(s) Oral every 8 hours  latanoprost 0.005% Ophthalmic Solution 1 Drop(s) Both EYES at bedtime  LORazepam     Tablet 0.25 milliGRAM(s) Oral at bedtime PRN  melatonin 3 milliGRAM(s) Oral at bedtime  mirtazapine 15 milliGRAM(s) Oral at bedtime  nitroprusside Infusion 0.3 MICROgram(s)/kG/Min IV Continuous <Continuous>  pantoprazole    Tablet 40 milliGRAM(s) Oral before breakfast  potassium chloride   Powder 20 milliEquivalent(s) Oral once  sodium chloride 0.9% lock flush 10 milliLiter(s) IV Push every 1 hour PRN  tamsulosin 0.8 milliGRAM(s) Oral at bedtime      Vitals:  T(C): 36.1 (07-03-24 @ 08:00), Max: 36.6 (07-03-24 @ 00:00)  HR: 66 (07-03-24 @ 18:00) (66 - 75)  BP: --  BP(mean): --  RR: 21 (07-03-24 @ 18:00) (10 - 28)  SpO2: 96% (07-03-24 @ 18:00) (95% - 100%)    Daily     Daily         I&O's Summary    02 Jul 2024 07:01  -  03 Jul 2024 07:00  --------------------------------------------------------  IN: 1650.2 mL / OUT: 2180 mL / NET: -529.8 mL    03 Jul 2024 07:01  -  03 Jul 2024 19:38  --------------------------------------------------------  IN: 637.5 mL / OUT: 825 mL / NET: -187.5 mL    Physical Exam:  Appearance: No Acute Distress  HEENT: PERRL  Neck: JVD elevated  Cardiovascular: Normal S1 S2, No murmurs/rubs/gallops  Respiratory: Clear to auscultation bilaterally  Gastrointestinal: Soft, Non-tender	  Skin: No cyanosis	  Neurologic: Non-focal  Extremities: b/l LE edema  Psychiatry: A & O x 3, Mood & affect appropriate    Labs:                        10.2   6.46  )-----------( 134      ( 03 Jul 2024 03:30 )             30.1     07-03    130<L>  |  89<L>  |  110<H>  ----------------------------<  151<H>  3.2<L>   |  20<L>  |  3.58<H>    Ca    7.9<L>      03 Jul 2024 16:15  Phos  7.3     07-03  Mg     2.60     07-03    TPro  6.5  /  Alb  3.1<L>  /  TBili  3.2<H>  /  DBili  x   /  AST  184<H>  /  ALT  81<H>  /  AlkPhos  356<H>  07-03

## 2024-07-03 NOTE — PROGRESS NOTE ADULT - SUBJECTIVE AND OBJECTIVE BOX
Subjective/Objective: Patient alert ,oriented . Patient reports improvement in breath and lower ext swelling. Patient still have severe orthopnea. pt unable to tolerate HOB<45 degree due sob      Tele event: NSR with PVcs at 60s    MEDICATIONS    aspirin enteric coated 81 milliGRAM(s) Oral daily  buMETAnide Infusion 2 mG/Hr IV Continuous <Continuous>  clopidogrel Tablet 75 milliGRAM(s) Oral daily  DOBUTamine Infusion 5 MICROgram(s)/kG/Min IV Continuous <Continuous>  heparin   Injectable 5000 Unit(s) SubCutaneous every 12 hours  hydrALAZINE 10 milliGRAM(s) Oral every 8 hours  nitroprusside Infusion 0.3 MICROgram(s)/kG/Min IV Continuous <Continuous>  FLUoxetine 20 milliGRAM(s) Oral <User Schedule>  LORazepam     Tablet 0.25 milliGRAM(s) Oral at bedtime PRN  melatonin 3 milliGRAM(s) Oral at bedtime  mirtazapine 15 milliGRAM(s) Oral at bedtime  pantoprazole    Tablet 40 milliGRAM(s) Oral before breakfast  atorvastatin 40 milliGRAM(s) Oral at bedtime  chlorhexidine 2% Cloths 1 Application(s) Topical <User Schedule>  latanoprost 0.005% Ophthalmic Solution 1 Drop(s) Both EYES at bedtime  potassium chloride  10 mEq/100 mL IVPB 10 milliEquivalent(s) IV Intermittent every 1 hour  sodium chloride 0.9% lock flush 10 milliLiter(s) IV Push every 1 hour PRN  tamsulosin 0.8 milliGRAM(s) Oral at bedtime            ICU Vital Signs Last 24 Hrs  T(C): 36.6 (03 Jul 2024 04:00), Max: 36.6 (02 Jul 2024 19:00)  T(F): 97.8 (03 Jul 2024 04:00), Max: 97.9 (02 Jul 2024 19:00)  HR: 68 (03 Jul 2024 08:19) (66 - 75)  BP: 153/82 (02 Jul 2024 13:00) (153/82 - 153/82)  BP(mean): 98 (02 Jul 2024 13:00) (98 - 106)  ABP: 83/47 (03 Jul 2024 07:00) (83/47 - 117/65)  ABP(mean): 59 (03 Jul 2024 07:00) (59 - 81)  RR: 14 (03 Jul 2024 07:00) (11 - 28)  SpO2: 100% (03 Jul 2024 08:19) (95% - 100%)    O2 Parameters below as of 03 Jul 2024 07:00  Patient On (Oxygen Delivery Method): nasal cannula w/ humidification  O2 Flow (L/min): 2          PHYSICAL EXAMINATION  Appearance:  no distress,frail  HEENT: Moist Mucous Membranes, Anicteric, PERRL, EOMI  Cardiovascular: Regular rate and rhythm, Normal S1 S2, No JVD, No murmurs  Respiratory: Lungs clear to auscultation. No rales, No rhonchi, No wheezing.  Gastrointestinal:  distended, Non-tender, + BS  Neurologic: Non-focal, A&Ox3  Skin: Warm and dry, No rashes, No ecchymosis, No cyanosis  Musculoskeletal: No clubbing, No cyanosis, 2+ edema mid abdomen to thigh   Vascular: Peripheral pulses palpable 2+ bilaterally  Psychiatry: Mood & affect appropriate      	    		      I&O's Summary    02 Jul 2024 07:01  -  03 Jul 2024 07:00  --------------------------------------------------------  IN: 1650.2 mL / OUT: 2180 mL / NET: -529.8 mL    	     LABS:	  LABORATORY VALUES	 	                          10.2   6.46  )-----------( 134      ( 03 Jul 2024 03:30 )             30.1       07-03    130<L>  |  90<L>  |  106<H>  ----------------------------<  116<H>  3.3<L>   |  16<L>  |  3.83<H>  07-02    130<L>  |  90<L>  |  109<H>  ----------------------------<  166<H>  3.3<L>   |  17<L>  |  3.70<H>    Ca    8.5      03 Jul 2024 03:30  Ca    8.1<L>      02 Jul 2024 17:05  Phos  7.3     07-03  Phos  7.7     07-02  Mg     2.60     07-03  Mg     2.40     07-02    TPro  6.5  /  Alb  3.1<L>  /  TBili  3.6<H>  /  DBili  x   /  AST  197<H>  /  ALT  84<H>  /  AlkPhos  339<H>  07-03  TPro  6.3  /  Alb  3.0<L>  /  TBili  3.7<H>  /  DBili  x   /  AST  180<H>  /  ALT  76<H>  /  AlkPhos  352<H>  07-02    LIVER FUNCTIONS - ( 03 Jul 2024 03:30 )  Alb: 3.1 g/dL / Pro: 6.5 g/dL / ALK PHOS: 339 U/L / ALT: 84 U/L / AST: 197 U/L / GGT: x               CARDIAC MARKERS:            Blood Gas Venous - Lactate: 2.5 mmol/L (07-03 @ 03:30)  Blood Gas Venous - Lactate: 2.4 mmol/L (07-02 @ 17:05)  Blood Gas Venous - Lactate: 4.3 mmol/L (07-02 @ 11:30)            Urinalysis Basic - ( 03 Jul 2024 03:30 )    Color: x / Appearance: x / SG: x / pH: x  Gluc: 116 mg/dL / Ketone: x  / Bili: x / Urobili: x   Blood: x / Protein: x / Nitrite: x   Leuk Esterase: x / RBC: x / WBC x   Sq Epi: x / Non Sq Epi: x / Bacteria: x      CAPILLARY BLOOD GLUCOSE          < from: TTE Echo Complete w/o Contrast w/ Doppler (06.06.24 @ 08:29) >   1. Biatrial enlargement   2. Left ventricular ejection fraction, by visual estimation, is <20%.   3. Severely decreased global left ventricular systolic function.   4. Entire septum, entire apex, and mid anterior segment are abnormal as   described above.   5. Increased LV wall thickness.   6. Mildly increased left ventricular internal cavity size.   7. Right ventricular volume and pressure overload.   8. There is mild concentric left ventricular hypertrophy.   9. Severely enlarged right ventricle.  10. Severely reduced RV systolic function.  11. Moderate mitral valve regurgitation.  12. Moderate-severe tricuspid regurgitation.  13. Mild aortic regurgitation.  14. Mild pulmonic valve regurgitation.  15. Estimated pulmonary artery systolic pressure is 44.6 mmHg assuming a   right atrial pressure of 15 mmHg, which is consistent with mild pulmonary   hypertension.  16. LA volume Index is 38.5 ml/m² ml/m2.    < end of copied text >        < from: CT Chest No Cont (06.24.24 @ 15:48) >  Since PET/CT 5/5/2024:    Unchanged left upper lobe nodule within the radiation field, however   slightly smaller compared to 4/1/2024. Recommend three-month follow-up to   assess for further change.Unchanged moderate right and new small left pleural effusions.    < end of copied text >            < from: Xray Chest 1 View-PORTABLE IMMEDIATE (Xray Chest 1 View-PORTABLE IMMEDIATE .) (07.01.24 @ 17:43) >  Right central venous catheter with tip in proximal SVC/brachiocephalic   vein area. Left upper extremity PICC with tip in the brachycephalic vein.  Trace bilateral pleural effusions with adjacent passive atelectasis.    < end of copied text >

## 2024-07-03 NOTE — PROGRESS NOTE ADULT - ASSESSMENT
79 year old male known to the [facility] HF clinic (Followed by Dr. Shell), with PMH of CAD with STEMI 5/20222 with cardiogenic shock and IABP placement,  but was not revascularized due to non viable myocardium, ICM (TTE 6/6/24 LVEF 20%, RV systolic dysfunction, moderate MR/TR), LV thrombus, prior lung adenocarcinoma with RUL VATS in 11/2022 s/p radiation therapy comes in with complaints of SOB, LE and hypotension. Of note- patient has been seen at [facility] 5 x this year for SOB, BRODY and was placed on Dobutamine gtt home infusion. Per patient's wife he was unable to take his diuretics due to hypotension at home for which in hopes of raising BP he was given a salt load with bullions. He continued to worsen, and patient's wife called [facility] HF clinic. Patient was found to be in cardiogenic shock with massive volume overload.

## 2024-07-03 NOTE — PROGRESS NOTE ADULT - SUBJECTIVE AND OBJECTIVE BOX
Date of Service  : 7/3/2024  Indication for Geriatrics and Palliative Care Services:  goals of care    SUBJECTIVE AND OBJECTIVE:  Patient seen and examined at bedside. Patient denies dyspnea at rest and pain.     INTERVAL HPI/OVERNIGHT EVENTS:  Per discussion with CCU, team, dobutamine gtt increased for aggressive diuresis.     Allergies    No Known Allergies    Intolerances    MEDICATIONS  (STANDING):  aspirin enteric coated 81 milliGRAM(s) Oral daily  atorvastatin 40 milliGRAM(s) Oral at bedtime  buMETAnide Infusion 2 mG/Hr (10 mL/Hr) IV Continuous <Continuous>  chlorhexidine 2% Cloths 1 Application(s) Topical <User Schedule>  chlorothiazide IVPB 500 milliGRAM(s) IV Intermittent two times a day  clopidogrel Tablet 75 milliGRAM(s) Oral daily  DOBUTamine Infusion 7.5 MICROgram(s)/kG/Min (19.2 mL/Hr) IV Continuous <Continuous>  FLUoxetine 20 milliGRAM(s) Oral <User Schedule>  heparin   Injectable 5000 Unit(s) SubCutaneous every 12 hours  hydrALAZINE 10 milliGRAM(s) Oral every 8 hours  latanoprost 0.005% Ophthalmic Solution 1 Drop(s) Both EYES at bedtime  melatonin 3 milliGRAM(s) Oral at bedtime  mirtazapine 15 milliGRAM(s) Oral at bedtime  nitroprusside Infusion 0.3 MICROgram(s)/kG/Min (3.83 mL/Hr) IV Continuous <Continuous>  pantoprazole    Tablet 40 milliGRAM(s) Oral before breakfast  potassium chloride   Powder 20 milliEquivalent(s) Oral once  tamsulosin 0.8 milliGRAM(s) Oral at bedtime    MEDICATIONS  (PRN):  acetaminophen     Tablet .. 325 milliGRAM(s) Oral every 4 hours PRN Moderate Pain (4 - 6)  LORazepam     Tablet 0.25 milliGRAM(s) Oral at bedtime PRN Anxiety  sodium chloride 0.9% lock flush 10 milliLiter(s) IV Push every 1 hour PRN Pre/post blood products, medications, blood draw, and to maintain line patency      ITEMS UNCHECKED ARE NOT PRESENT    PRESENT SYMPTOMS: [ ]Unable to self-report due to altered mental status- see [ ] CPOT [ ] PAINADS [ ] RDOS  Source if other than patient:  [ ]Family   [ ]Team     Pain:  [ ]yes [x ]no  QOL impact -   Location -                    Aggravating factors -  Quality -  Radiation -  Timing-  Severity (0-10 scale):  Minimal acceptable level / Pain Goal (0-10 scale):     Dyspnea:                           [ ]Mild [ ]Moderate [ ]Severe  Anxiety:                             [x ]Mild [ ]Moderate [ ]Severe  Agitation:                          [ ]Mild [ ]Moderate [ ]Severe  Fatigue:                             [ ]Mild [ ]Moderate [ ]Severe  Nausea:                             [ ]Mild [ ]Moderate [ ]Severe  Loss of appetite:              [ ]Mild [ ]Moderate [ ]Severe  Constipation:                   [ ]Mild [ ]Moderate [ ]Severe  Diarrhea:                          [ ]Mild [ ]Moderate [ ]Severe    CPOT:    https://www.[facility].org/getattachment/[email or web address]/Critical-Care-Pain-Observation-Tool-(CPOT)    PCSSQ[Palliative Care Spiritual Screening Question]   Severity (0-10):  Score of 4 or > indicate consideration of Chaplaincy referral.  Chaplaincy Referral: [ ] yes [ ] refused [x ] following [ ] deferred    Caregiver Arvilla? : [ ] yes [ ] no [ ] Declined [x ] Deferred              Social work referral [ ] Patient & Family Centered Care Referral [ ]     Anticipatory Grief present?:  [x ] yes [ ] no  [ ] Deferred                  Social work referral [ ] Chaplaincy Referral[ ]    Other Symptoms:  [x ]All other review of systems negative , except orthopnea   [ ] Unable to obtain due to poor mentation     PHYSICAL EXAM:  Vital Signs Last 24 Hrs  T(C): 36.1 (03 Jul 2024 08:00), Max: 36.6 (02 Jul 2024 19:00)  T(F): 96.9 (03 Jul 2024 08:00), Max: 97.9 (02 Jul 2024 19:00)  HR: 66 (03 Jul 2024 18:00) (66 - 75)  BP: --  BP(mean): --  RR: 21 (03 Jul 2024 18:00) (10 - 28)  SpO2: 96% (03 Jul 2024 18:00) (95% - 100%)    Parameters below as of 03 Jul 2024 08:00  Patient On (Oxygen Delivery Method): room air         I&O's Summary    02 Jul 2024 07:01  -  03 Jul 2024 07:00  --------------------------------------------------------  IN: 1650.2 mL / OUT: 2180 mL / NET: -529.8 mL    03 Jul 2024 07:01  -  03 Jul 2024 18:30  --------------------------------------------------------  IN: 637.5 mL / OUT: 525 mL / NET: 112.5 mL     GENERAL:  [x ]Alert  [x ]Oriented x  3 [ ]Lethargic  [ ]Cachexia  [ ]Unarousable  [ x]Verbal  [ ]Non-Verbal  [x ] No Distress  Behavioral:   [ ] Anxiety  [ ] Delirium [ ] Agitation [x ] Calm   HEENT:  [ x]Normal  [ ] Temporal Wasting  [ ]Dry mouth   [ ]ET Tube/Trach  [ ]Oral lesions  [ ] Mucositis  PULMONARY: unlabored breathing, no accessory muscle use   [ ]Clear [ ]Tachypnea   [ ]Audible excessive secretions   [ ]Rhonchi        [ ]Right [ ]Left [ ]Bilateral  [ ]Crackles        [ ]Right [ ]Left [ ]Bilateral  [ ]Wheezing     [ ]Right [ ]Left [ ]Bilateral  [ ]Diminished breath sounds [ ]right [ ]left [ ]bilateral  CARDIOVASCULAR:    [x ]Regular [ ]Irregular [ ]Tachy  [ ]Christiano [ ]Murmur [ ]Other  GASTROINTESTINAL:  [ x]Soft  [ ]Distended   [ ]+BS  [ x]Non tender [ ]Tender  [ ]PEG [ ]OGT/ NGT  Last BM:   GENITOURINARY:  [x ]Normal [ ] Incontinent   [ ]Oliguria/Anuria   [ ]Chandler  MUSCULOSKELETAL:   [x ]Normal   [ ]Weakness  [ ]Bed/Wheelchair bound [x ]Edema in b/l lower extremities   [  ] contraction  NEUROLOGIC:   [ x]No focal deficits  [ ]Cognitive impairment  [ ]Dysphagia [ ]Dysarthria [ ]Paresis [ ]Other   SKIN: See Nursing Skin Assessment for further details  [x]Normal    [ ]Rash  [ ]Pressure ulcer(s)       Present on admission [ ]y [ ]n   [  ]  Wound    [  ] hyperpigmentation      CRITICAL CARE:  [x ]Shock Present  [ ]Septic [x ]Cardiogenic [ ]Neurologic [ ]Hypovolemic  [ ]Vasopressors [ x]Inotropes  [ ]Respiratory failure present [ ]Mechanical Ventilation [ ]Non-invasive ventilatory support [ ]High-Flow   [ ]Acute  [ ]Chronic [ ]Hypoxic  [ ]Hypercarbic [ ]Other  [ x]Other organ failure - renal    LABS:  reviewed                         10.2   6.46  )-----------( 134      ( 03 Jul 2024 03:30 )             30.1   07-03    130<L>  |  89<L>  |  110<H>  ----------------------------<  151<H>  3.2<L>   |  20<L>  |  3.58<H>    Ca    7.9<L>      03 Jul 2024 16:15  Phos  7.3     07-03  Mg     2.60     07-03    TPro  6.5  /  Alb  3.1<L>  /  TBili  3.2<H>  /  DBili  x   /  AST  184<H>  /  ALT  81<H>  /  AlkPhos  356<H>  07-03    Urinalysis Basic - ( 03 Jul 2024 16:15 )    Color: x / Appearance: x / SG: x / pH: x  Gluc: 151 mg/dL / Ketone: x  / Bili: x / Urobili: x   Blood: x / Protein: x / Nitrite: x   Leuk Esterase: x / RBC: x / WBC x   Sq Epi: x / Non Sq Epi: x / Bacteria: x      CAPILLARY BLOOD GLUCOSE              RADIOLOGY & ADDITIONAL STUDIES: reviewed     Protein Calorie Malnutrition Present: [ ]mild [ ]moderate [ ]severe [ ]underweight [ ]morbid obesity  https://www.andeal.org/vault/2440/web/files/ONC/Table_Clinical%20Characteristics%20to%20Document%20Malnutrition-White%20JV%20et%20al%202012.pdf    Height (cm): 182.9 (07-01-24 @ 13:27), 182.9 (06-07-24 @ 12:30), 182.9 (05-30-24 @ 07:31)  Weight (kg): 85.2 (07-01-24 @ 18:00), 86.2 (06-14-24 @ 10:00), 84.4 (05-30-24 @ 07:31)  BMI (kg/m2): 25.5 (07-01-24 @ 18:00), 25.8 (07-01-24 @ 13:27), 25.8 (06-14-24 @ 10:00)    [ ]PPSV2 < or = 30%  [ ]significant weight loss [ ]poor nutritional intake [ ]anasarca   [ ]Artificial Nutrition    REFERRALS:   [ ]Chaplaincy  [ ]Hospice  [ ]Child Life  [ ]Social Work  [ x]Case management [ ]Holistic Therapy

## 2024-07-03 NOTE — PROGRESS NOTE ADULT - PROBLEM SELECTOR PLAN 2
- Per patient and wife. Patient has tried ambien and melatonin with no effect. Has also tried Temazepam, but made him very lethargic the next day.   - Wife shares that patient anxious that if he closes eyes, he may not wake up and states anxiety may possibly be related to his insomnia.   - Sleep Hygiene discussed.

## 2024-07-03 NOTE — PROGRESS NOTE ADULT - PROBLEM SELECTOR PLAN 6
Please provide information for outpatient palliative care office on discharge paperwork when stable for discharge. [address] [place] [address]. Phone Number: [phone].    Case reviewed with CCU team  Thank you for allowing us to participate in your patient's care. Please page [phone] for any questions/concerns.

## 2024-07-03 NOTE — PROGRESS NOTE ADULT - PROBLEM SELECTOR PLAN 1
- TTE 6/6 -  1. Biatrial enlargement 2. Left ventricular ejection fraction, by visual estimation, is <20%.  3. Severely decreased global left ventricular systolic function.  4. Entire septum, entire apex, and mid anterior segment are abnormal as described above.  5. Increased LV wall thickness.  6. Mildly increased left ventricular internal cavity size.   7. Right ventricular volume and pressure overload. 8. There is mild concentric left ventricular hypertrophy.  9. Severely enlarged right ventricle.10. Severely reduced RV systolic function.11. Moderate mitral valve regurgitation.  12. Moderate-severe tricuspid regurgitation.13. Mild aortic regurgitation.14. Mild pulmonic valve regurgitation.  15. Estimated pulmonary artery systolic pressure is 44.6 mmHg assuming a right atrial pressure of 15 mmHg, which is consistent with mild pulmonary hypertension.16. LA volume Index is 38.5 ml/m² ml/m2.  - CCU and Heart Failure recommendations appreciated  - Per discussion with CCU team, patient in cardiogenic shock, currently on dobutamine gtt, nitroprusside gtt , and bumex gtt  - management as per primary team.

## 2024-07-03 NOTE — PROGRESS NOTE ADULT - PROBLEM SELECTOR PLAN 1
-c/w ; increase to 7.5   - nipride if able to tolerate; titrate to SBP 85  -Continue Bumex gtt; goal net negative; give diuril and 3% saline  -Keep K 4.0-5.0. and Mag >2.0.   -Strict I/O.   - Patient expressed his wishes to be DNR/DNI- wife present by bedside and agreed.  -Explained overall poor prognosis. Patient  and wife stated they understand.  appreciate pall care

## 2024-07-03 NOTE — PROGRESS NOTE ADULT - ASSESSMENT
This is a 79 year old male known to the [facility] HF clinic (Followed by Dr. Shell), with PMH of CAD with STEMI  with cardiogenic shock and IABP placement (medically managed d/t non-viable myocardium), ICM/HFrEF (EF 20%, RV systolic dysfunction, moderate MR/TR; on  since ), LV thrombus, prior lung adenocarcinoma with RUL VATS in 2022 s/p radiation therapy presented on  with complaints of SOB, LE and hypotension. In ED patient is tachypenic, tachycardic with BP 60-130s (SVT), hypotensive with SBP 70-90s, O2 sat 70s, and was placed on BIPAP. Home Dobutamine gtt was held in ED due to SVT, but was restarted s/p HF consultation. Overall stage D HF, NYHA class IV- is in cardiogenic shock with massive volume overload. Improving wiht /bumex gtt but remains critically ill.

## 2024-07-04 LAB
ALBUMIN SERPL ELPH-MCNC: 2.9 G/DL — LOW (ref 3.3–5)
ALBUMIN SERPL ELPH-MCNC: 3 G/DL — LOW (ref 3.3–5)
ALBUMIN SERPL ELPH-MCNC: 3.1 G/DL — LOW (ref 3.3–5)
ALP SERPL-CCNC: 326 U/L — HIGH (ref 40–120)
ALP SERPL-CCNC: 329 U/L — HIGH (ref 40–120)
ALP SERPL-CCNC: 338 U/L — HIGH (ref 40–120)
ALP SERPL-CCNC: 345 U/L — HIGH (ref 40–120)
ALP SERPL-CCNC: 354 U/L — HIGH (ref 40–120)
ALT FLD-CCNC: 71 U/L — HIGH (ref 4–41)
ALT FLD-CCNC: 72 U/L — HIGH (ref 4–41)
ALT FLD-CCNC: 76 U/L — HIGH (ref 4–41)
ALT FLD-CCNC: 78 U/L — HIGH (ref 4–41)
ALT FLD-CCNC: 81 U/L — HIGH (ref 4–41)
ANION GAP SERPL CALC-SCNC: 15 MMOL/L — HIGH (ref 7–14)
ANION GAP SERPL CALC-SCNC: 20 MMOL/L — HIGH (ref 7–14)
ANION GAP SERPL CALC-SCNC: 22 MMOL/L — HIGH (ref 7–14)
AST SERPL-CCNC: 148 U/L — HIGH (ref 4–40)
AST SERPL-CCNC: 158 U/L — HIGH (ref 4–40)
AST SERPL-CCNC: 169 U/L — HIGH (ref 4–40)
AST SERPL-CCNC: 173 U/L — HIGH (ref 4–40)
AST SERPL-CCNC: 176 U/L — HIGH (ref 4–40)
BILIRUB SERPL-MCNC: 3.1 MG/DL — HIGH (ref 0.2–1.2)
BILIRUB SERPL-MCNC: 3.2 MG/DL — HIGH (ref 0.2–1.2)
BILIRUB SERPL-MCNC: 3.2 MG/DL — HIGH (ref 0.2–1.2)
BILIRUB SERPL-MCNC: 3.3 MG/DL — HIGH (ref 0.2–1.2)
BILIRUB SERPL-MCNC: 3.3 MG/DL — HIGH (ref 0.2–1.2)
BLOOD GAS ARTERIAL COMPREHENSIVE RESULT: SIGNIFICANT CHANGE UP
BUN SERPL-MCNC: 107 MG/DL — HIGH (ref 7–23)
BUN SERPL-MCNC: 108 MG/DL — HIGH (ref 7–23)
BUN SERPL-MCNC: 108 MG/DL — HIGH (ref 7–23)
BUN SERPL-MCNC: 110 MG/DL — HIGH (ref 7–23)
BUN SERPL-MCNC: 112 MG/DL — HIGH (ref 7–23)
CALCIUM SERPL-MCNC: 7.5 MG/DL — LOW (ref 8.4–10.5)
CALCIUM SERPL-MCNC: 7.6 MG/DL — LOW (ref 8.4–10.5)
CALCIUM SERPL-MCNC: 7.6 MG/DL — LOW (ref 8.4–10.5)
CALCIUM SERPL-MCNC: 7.9 MG/DL — LOW (ref 8.4–10.5)
CALCIUM SERPL-MCNC: 8 MG/DL — LOW (ref 8.4–10.5)
CHLORIDE SERPL-SCNC: 89 MMOL/L — LOW (ref 98–107)
CHLORIDE SERPL-SCNC: 90 MMOL/L — LOW (ref 98–107)
CHLORIDE SERPL-SCNC: 92 MMOL/L — LOW (ref 98–107)
CO2 SERPL-SCNC: 17 MMOL/L — LOW (ref 22–31)
CO2 SERPL-SCNC: 18 MMOL/L — LOW (ref 22–31)
CO2 SERPL-SCNC: 19 MMOL/L — LOW (ref 22–31)
CO2 SERPL-SCNC: 20 MMOL/L — LOW (ref 22–31)
CO2 SERPL-SCNC: 21 MMOL/L — LOW (ref 22–31)
CREAT SERPL-MCNC: 3.81 MG/DL — HIGH (ref 0.5–1.3)
CREAT SERPL-MCNC: 3.83 MG/DL — HIGH (ref 0.5–1.3)
CREAT SERPL-MCNC: 3.85 MG/DL — HIGH (ref 0.5–1.3)
CREAT SERPL-MCNC: 3.87 MG/DL — HIGH (ref 0.5–1.3)
CREAT SERPL-MCNC: 3.88 MG/DL — HIGH (ref 0.5–1.3)
EGFR: 15 ML/MIN/1.73M2 — LOW
GLUCOSE SERPL-MCNC: 117 MG/DL — HIGH (ref 70–99)
GLUCOSE SERPL-MCNC: 122 MG/DL — HIGH (ref 70–99)
GLUCOSE SERPL-MCNC: 129 MG/DL — HIGH (ref 70–99)
GLUCOSE SERPL-MCNC: 91 MG/DL — SIGNIFICANT CHANGE UP (ref 70–99)
GLUCOSE SERPL-MCNC: 99 MG/DL — SIGNIFICANT CHANGE UP (ref 70–99)
HCT VFR BLD CALC: 28.5 % — LOW (ref 39–50)
HGB BLD-MCNC: 10.1 G/DL — LOW (ref 13–17)
MAGNESIUM SERPL-MCNC: 2.4 MG/DL — SIGNIFICANT CHANGE UP (ref 1.6–2.6)
MCHC RBC-ENTMCNC: 29.9 PG — SIGNIFICANT CHANGE UP (ref 27–34)
MCHC RBC-ENTMCNC: 35.4 GM/DL — SIGNIFICANT CHANGE UP (ref 32–36)
MCV RBC AUTO: 84.3 FL — SIGNIFICANT CHANGE UP (ref 80–100)
NRBC # BLD: 0 /100 WBCS — SIGNIFICANT CHANGE UP (ref 0–0)
NRBC # FLD: 0.05 K/UL — HIGH (ref 0–0)
PHOSPHATE SERPL-MCNC: 6.2 MG/DL — HIGH (ref 2.5–4.5)
PHOSPHATE SERPL-MCNC: 6.6 MG/DL — HIGH (ref 2.5–4.5)
PHOSPHATE SERPL-MCNC: 6.6 MG/DL — HIGH (ref 2.5–4.5)
PLATELET # BLD AUTO: 123 K/UL — LOW (ref 150–400)
POTASSIUM SERPL-MCNC: 3 MMOL/L — LOW (ref 3.5–5.3)
POTASSIUM SERPL-MCNC: 3.2 MMOL/L — LOW (ref 3.5–5.3)
POTASSIUM SERPL-MCNC: 3.5 MMOL/L — SIGNIFICANT CHANGE UP (ref 3.5–5.3)
POTASSIUM SERPL-MCNC: 3.5 MMOL/L — SIGNIFICANT CHANGE UP (ref 3.5–5.3)
POTASSIUM SERPL-MCNC: 8.5 MMOL/L — CRITICAL HIGH (ref 3.5–5.3)
POTASSIUM SERPL-SCNC: 3 MMOL/L — LOW (ref 3.5–5.3)
POTASSIUM SERPL-SCNC: 3.2 MMOL/L — LOW (ref 3.5–5.3)
POTASSIUM SERPL-SCNC: 3.5 MMOL/L — SIGNIFICANT CHANGE UP (ref 3.5–5.3)
POTASSIUM SERPL-SCNC: 3.5 MMOL/L — SIGNIFICANT CHANGE UP (ref 3.5–5.3)
POTASSIUM SERPL-SCNC: 8.5 MMOL/L — CRITICAL HIGH (ref 3.5–5.3)
PROCALCITONIN SERPL-MCNC: 0.69 NG/ML — HIGH (ref 0.02–0.1)
PROT SERPL-MCNC: 5.9 G/DL — LOW (ref 6–8.3)
PROT SERPL-MCNC: 6.1 G/DL — SIGNIFICANT CHANGE UP (ref 6–8.3)
PROT SERPL-MCNC: 6.1 G/DL — SIGNIFICANT CHANGE UP (ref 6–8.3)
PROT SERPL-MCNC: 6.3 G/DL — SIGNIFICANT CHANGE UP (ref 6–8.3)
PROT SERPL-MCNC: 6.3 G/DL — SIGNIFICANT CHANGE UP (ref 6–8.3)
RBC # BLD: 3.38 M/UL — LOW (ref 4.2–5.8)
RBC # FLD: 16.9 % — HIGH (ref 10.3–14.5)
SODIUM SERPL-SCNC: 128 MMOL/L — LOW (ref 135–145)
SODIUM SERPL-SCNC: 129 MMOL/L — LOW (ref 135–145)
SODIUM SERPL-SCNC: 130 MMOL/L — LOW (ref 135–145)
WBC # BLD: 7.22 K/UL — SIGNIFICANT CHANGE UP (ref 3.8–10.5)
WBC # FLD AUTO: 7.22 K/UL — SIGNIFICANT CHANGE UP (ref 3.8–10.5)

## 2024-07-04 PROCEDURE — 99232 SBSQ HOSP IP/OBS MODERATE 35: CPT | Mod: FS

## 2024-07-04 RX ORDER — POTASSIUM CHLORIDE 600 MG/1
40 TABLET, FILM COATED, EXTENDED RELEASE ORAL ONCE
Refills: 0 | Status: DISCONTINUED | OUTPATIENT
Start: 2024-07-04 | End: 2024-07-04

## 2024-07-04 RX ORDER — POTASSIUM CHLORIDE 600 MG/1
20 TABLET, FILM COATED, EXTENDED RELEASE ORAL ONCE
Refills: 0 | Status: COMPLETED | OUTPATIENT
Start: 2024-07-04 | End: 2024-07-04

## 2024-07-04 RX ORDER — LIDOCAINE HCL 28 MG/G
1 GEL TOPICAL DAILY
Refills: 0 | Status: DISCONTINUED | OUTPATIENT
Start: 2024-07-04 | End: 2024-07-04

## 2024-07-04 RX ORDER — LORAZEPAM 0.5 MG
0.25 TABLET ORAL AT BEDTIME
Refills: 0 | Status: COMPLETED | OUTPATIENT
Start: 2024-07-04 | End: 2024-07-11

## 2024-07-04 RX ORDER — LIDOCAINE HCL 28 MG/G
1 GEL TOPICAL DAILY
Refills: 0 | Status: DISCONTINUED | OUTPATIENT
Start: 2024-07-04 | End: 2024-07-11

## 2024-07-04 RX ORDER — POTASSIUM CHLORIDE 600 MG/1
20 TABLET, FILM COATED, EXTENDED RELEASE ORAL
Refills: 0 | Status: COMPLETED | OUTPATIENT
Start: 2024-07-04 | End: 2024-07-04

## 2024-07-04 RX ORDER — LORAZEPAM 0.5 MG
0.5 TABLET ORAL ONCE
Refills: 0 | Status: DISCONTINUED | OUTPATIENT
Start: 2024-07-04 | End: 2024-07-04

## 2024-07-04 RX ORDER — POTASSIUM CHLORIDE 600 MG/1
40 TABLET, FILM COATED, EXTENDED RELEASE ORAL ONCE
Refills: 0 | Status: COMPLETED | OUTPATIENT
Start: 2024-07-04 | End: 2024-07-04

## 2024-07-04 RX ORDER — TRAZODONE HYDROCHLORIDE 50 MG/1
25 TABLET, FILM COATED ORAL ONCE
Refills: 0 | Status: COMPLETED | OUTPATIENT
Start: 2024-07-04 | End: 2024-07-04

## 2024-07-04 RX ADMIN — POTASSIUM CHLORIDE 40 MILLIEQUIVALENT(S): 600 TABLET, FILM COATED, EXTENDED RELEASE ORAL at 21:22

## 2024-07-04 RX ADMIN — Medication 200 MILLIGRAM(S): at 05:40

## 2024-07-04 RX ADMIN — HYDRALAZINE HYDROCHLORIDE 10 MILLIGRAM(S): 50 TABLET ORAL at 05:40

## 2024-07-04 RX ADMIN — Medication 325 MILLIGRAM(S): at 23:53

## 2024-07-04 RX ADMIN — CLOPIDOGREL BISULFATE 75 MILLIGRAM(S): 75 TABLET, FILM COATED ORAL at 11:27

## 2024-07-04 RX ADMIN — HYDRALAZINE HYDROCHLORIDE 10 MILLIGRAM(S): 50 TABLET ORAL at 13:58

## 2024-07-04 RX ADMIN — Medication 325 MILLIGRAM(S): at 08:40

## 2024-07-04 RX ADMIN — Medication 0.25 MILLIGRAM(S): at 07:40

## 2024-07-04 RX ADMIN — LATANOPROST PF 1 DROP(S): 0.05 SOLUTION/ DROPS OPHTHALMIC at 21:10

## 2024-07-04 RX ADMIN — HEPARIN SODIUM 5000 UNIT(S): 50 INJECTION, SOLUTION INTRAVENOUS at 05:41

## 2024-07-04 RX ADMIN — BUMETANIDE 10 MG/HR: 0.25 INJECTION INTRAMUSCULAR; INTRAVENOUS at 07:02

## 2024-07-04 RX ADMIN — TRAZODONE HYDROCHLORIDE 25 MILLIGRAM(S): 50 TABLET, FILM COATED ORAL at 05:40

## 2024-07-04 RX ADMIN — Medication 19.2 MICROGRAM(S)/KG/MIN: at 07:00

## 2024-07-04 RX ADMIN — BUMETANIDE 10 MG/HR: 0.25 INJECTION INTRAMUSCULAR; INTRAVENOUS at 19:31

## 2024-07-04 RX ADMIN — Medication 3 MILLIGRAM(S): at 21:10

## 2024-07-04 RX ADMIN — POTASSIUM CHLORIDE 20 MILLIEQUIVALENT(S): 600 TABLET, FILM COATED, EXTENDED RELEASE ORAL at 05:40

## 2024-07-04 RX ADMIN — Medication 1 APPLICATION(S): at 05:41

## 2024-07-04 RX ADMIN — Medication 19.2 MICROGRAM(S)/KG/MIN: at 19:30

## 2024-07-04 RX ADMIN — ASPIRIN 81 MILLIGRAM(S): 325 TABLET, FILM COATED ORAL at 11:27

## 2024-07-04 RX ADMIN — MIRTAZAPINE 15 MILLIGRAM(S): 15 TABLET, FILM COATED ORAL at 21:10

## 2024-07-04 RX ADMIN — PANTOPRAZOLE SODIUM 40 MILLIGRAM(S): 40 INJECTION, POWDER, FOR SOLUTION INTRAVENOUS at 05:40

## 2024-07-04 RX ADMIN — TAMSULOSIN HYDROCHLORIDE 0.8 MILLIGRAM(S): 0.4 CAPSULE ORAL at 21:10

## 2024-07-04 RX ADMIN — HEPARIN SODIUM 5000 UNIT(S): 50 INJECTION, SOLUTION INTRAVENOUS at 18:14

## 2024-07-04 RX ADMIN — POTASSIUM CHLORIDE 100 MILLIEQUIVALENT(S): 600 TABLET, FILM COATED, EXTENDED RELEASE ORAL at 13:51

## 2024-07-04 RX ADMIN — Medication 0.5 MILLIGRAM(S): at 01:52

## 2024-07-04 RX ADMIN — Medication 325 MILLIGRAM(S): at 07:40

## 2024-07-04 RX ADMIN — POTASSIUM CHLORIDE 100 MILLIEQUIVALENT(S): 600 TABLET, FILM COATED, EXTENDED RELEASE ORAL at 00:53

## 2024-07-04 RX ADMIN — Medication 0.25 MILLIGRAM(S): at 21:09

## 2024-07-04 RX ADMIN — POTASSIUM CHLORIDE 100 MILLIEQUIVALENT(S): 600 TABLET, FILM COATED, EXTENDED RELEASE ORAL at 11:53

## 2024-07-04 NOTE — PROGRESS NOTE ADULT - SUBJECTIVE AND OBJECTIVE BOX
Subjective/Objective: patient alert, oriented x3. Pt reports unable to sleep last night  due to anxiety. currently sleeping after receiving ativan this morning     Overnight event: received ativan 0.25mg overnight with little effect   Tele event: NSR with PVC  60-80    MEDICATIONS    aspirin enteric coated 81 milliGRAM(s) Oral daily  buMETAnide Infusion 2 mG/Hr IV Continuous <Continuous>  clopidogrel Tablet 75 milliGRAM(s) Oral daily  DOBUTamine Infusion 7.5 MICROgram(s)/kG/Min IV Continuous <Continuous>  heparin   Injectable 5000 Unit(s) SubCutaneous every 12 hours  hydrALAZINE 10 milliGRAM(s) Oral every 8 hours  nitroprusside Infusion 0.3 MICROgram(s)/kG/Min IV Continuous <Continuous>  acetaminophen     Tablet .. 325 milliGRAM(s) Oral every 4 hours PRN  FLUoxetine 20 milliGRAM(s) Oral <User Schedule>  LORazepam     Tablet 0.25 milliGRAM(s) Oral at bedtime PRN  melatonin 3 milliGRAM(s) Oral at bedtime  mirtazapine 15 milliGRAM(s) Oral at bedtime  pantoprazole    Tablet 40 milliGRAM(s) Oral before breakfast  atorvastatin 40 milliGRAM(s) Oral at bedtime  chlorhexidine 2% Cloths 1 Application(s) Topical <User Schedule>  latanoprost 0.005% Ophthalmic Solution 1 Drop(s) Both EYES at bedtime  sodium chloride 0.9% lock flush 10 milliLiter(s) IV Push every 1 hour PRN  tamsulosin 0.8 milliGRAM(s) Oral at bedtime              ICU Vital Signs Last 24 Hrs  T(C): 35 (04 Jul 2024 04:00), Max: 35 (04 Jul 2024 04:00)  T(F): 95 (04 Jul 2024 04:00), Max: 95 (04 Jul 2024 04:00)  HR: 77 (04 Jul 2024 08:00) (65 - 84)  ABP: 98/49 (04 Jul 2024 08:00) (78/42 - 107/58)  ABP(mean): 63 (04 Jul 2024 08:00) (55 - 74)  RR: 12 (04 Jul 2024 08:00) (10 - 26)  SpO2: 96% (04 Jul 2024 08:00) (96% - 100%)    O2 Parameters below as of 04 Jul 2024 08:00  Patient On (Oxygen Delivery Method): nasal cannula  O2 Flow (L/min): 2          PHYSICAL EXAMINATION  Appearance: NAD, no distress  HEENT: Moist Mucous Membranes, Anicteric, PERRL, EOMI  Cardiovascular: Regular rate and rhythm, Normal S1 S2, No JVD, No murmurs  Respiratory: Lungs clear to auscultation. No rales, No rhonchi, No wheezing.  Gastrointestinal:  Soft, Non-tender, + BS  Neurologic: Non-focal, A&Ox3  Skin: Warm and dry, No rashes, No ecchymosis, No cyanosis  Musculoskeletal: No clubbing, No cyanosis, No edema  Vascular: Peripheral pulses palpable 2+ bilaterally  Psychiatry: Mood & affect appropriate      	    		      I&O's Summary    03 Jul 2024 07:01  -  04 Jul 2024 07:00  --------------------------------------------------------  IN: 1636.1 mL / OUT: 2425 mL / NET: -788.9 mL    04 Jul 2024 07:01  -  04 Jul 2024 08:51  --------------------------------------------------------  IN: 58.4 mL / OUT: 395 mL / NET: -336.6 mL    	     LABS:	  LABORATORY VALUES	 	                          10.1   7.22  )-----------( 123      ( 04 Jul 2024 03:22 )             28.5       07-04    129<L>  |  90<L>  |  108<H>  ----------------------------<  99  3.5   |  17<L>  |  3.88<H>  07-03    130<L>  |  90<L>  |  108<H>  ----------------------------<  129<H>  3.5   |  20<L>  |  3.85<H>    Ca    8.0<L>      04 Jul 2024 03:22  Ca    7.9<L>      03 Jul 2024 23:30  Phos  6.6     07-04  Phos  6.6     07-03  Mg     2.40     07-04  Mg     2.40     07-03    TPro  6.3  /  Alb  3.0<L>  /  TBili  3.3<H>  /  DBili  x   /  AST  176<H>  /  ALT  81<H>  /  AlkPhos  338<H>  07-04  TPro  6.1  /  Alb  3.0<L>  /  TBili  3.3<H>  /  DBili  x   /  AST  173<H>  /  ALT  78<H>  /  AlkPhos  326<H>  07-03    LIVER FUNCTIONS - ( 04 Jul 2024 03:22 )  Alb: 3.0 g/dL / Pro: 6.3 g/dL / ALK PHOS: 338 U/L / ALT: 81 U/L / AST: 176 U/L / GGT: x               CARDIAC MARKERS:            Blood Gas Venous - Lactate: 1.6 mmol/L (07-03 @ 23:30)  Blood Gas Venous - Lactate: 2.2 mmol/L (07-03 @ 16:15)  Blood Gas Venous - Lactate: 2.5 mmol/L (07-03 @ 03:30)          ABG - ( 04 Jul 2024 03:22 )  pH, Arterial: 7.45  pH, Blood: x     /  pCO2: 26    /  pO2: 133   / HCO3: 18    / Base Excess: -4.9  /  SaO2: 99.0              Urinalysis Basic - ( 04 Jul 2024 03:22 )    Color: x / Appearance: x / SG: x / pH: x  Gluc: 99 mg/dL / Ketone: x  / Bili: x / Urobili: x   Blood: x / Protein: x / Nitrite: x   Leuk Esterase: x / RBC: x / WBC x   Sq Epi: x / Non Sq Epi: x / Bacteria: x      CAPILLARY BLOOD GLUCOSE                             Subjective/Objective: patient alert, oriented x3. Pt reports unable to sleep last night  due to anxiety. Currently sleeping after receiving ativan this morning     Overnight event: received ativan 0.25mg overnight with little effect   Tele event: NSR with PVC  60-80    MEDICATIONS    aspirin enteric coated 81 milliGRAM(s) Oral daily  buMETAnide Infusion 2 mG/Hr IV Continuous <Continuous>  clopidogrel Tablet 75 milliGRAM(s) Oral daily  DOBUTamine Infusion 7.5 MICROgram(s)/kG/Min IV Continuous <Continuous>  heparin   Injectable 5000 Unit(s) SubCutaneous every 12 hours  hydrALAZINE 10 milliGRAM(s) Oral every 8 hours  nitroprusside Infusion 0.3 MICROgram(s)/kG/Min IV Continuous <Continuous>  acetaminophen     Tablet .. 325 milliGRAM(s) Oral every 4 hours PRN  FLUoxetine 20 milliGRAM(s) Oral <User Schedule>  LORazepam     Tablet 0.25 milliGRAM(s) Oral at bedtime PRN  melatonin 3 milliGRAM(s) Oral at bedtime  mirtazapine 15 milliGRAM(s) Oral at bedtime  pantoprazole    Tablet 40 milliGRAM(s) Oral before breakfast  atorvastatin 40 milliGRAM(s) Oral at bedtime  chlorhexidine 2% Cloths 1 Application(s) Topical <User Schedule>  latanoprost 0.005% Ophthalmic Solution 1 Drop(s) Both EYES at bedtime  sodium chloride 0.9% lock flush 10 milliLiter(s) IV Push every 1 hour PRN  tamsulosin 0.8 milliGRAM(s) Oral at bedtime              ICU Vital Signs Last 24 Hrs  T(C): 35 (04 Jul 2024 04:00), Max: 35 (04 Jul 2024 04:00)  T(F): 95 (04 Jul 2024 04:00), Max: 95 (04 Jul 2024 04:00)  HR: 77 (04 Jul 2024 08:00) (65 - 84)  ABP: 98/49 (04 Jul 2024 08:00) (78/42 - 107/58)  ABP(mean): 63 (04 Jul 2024 08:00) (55 - 74)  RR: 12 (04 Jul 2024 08:00) (10 - 26)  SpO2: 96% (04 Jul 2024 08:00) (96% - 100%)    O2 Parameters below as of 04 Jul 2024 08:00  Patient On (Oxygen Delivery Method): nasal cannula  O2 Flow (L/min): 2          PHYSICAL EXAMINATION  Appearance: NAD, no distress, weak frail   HEENT: Moist Mucous Membranes, Anicteric, PERRL, EOMI  Cardiovascular: Regular rate and rhythm, Normal S1 S2, No JVD, + murmurs  Respiratory: b/l lungs crackles to auscultation, No rhonchi, No wheezing.  Gastrointestinal:  firm, Non-tender, + BS  Neurologic: Non-focal, A&Ox3  Skin: Warm and dry, No rashes, No ecchymosis, No cyanosis  Musculoskeletal: No clubbing, No cyanosis , 2+ edema mid abd to thigh  Vascular: Peripheral pulses palpable 2+ bilaterally  Psychiatry: Mood & affect appropriate      	    		      I&O's Summary    03 Jul 2024 07:01  -  04 Jul 2024 07:00  --------------------------------------------------------  IN: 1636.1 mL / OUT: 2425 mL / NET: -788.9 mL    04 Jul 2024 07:01  -  04 Jul 2024 08:51  --------------------------------------------------------  IN: 58.4 mL / OUT: 395 mL / NET: -336.6 mL    	     LABS:	  LABORATORY VALUES	 	                          10.1   7.22  )-----------( 123      ( 04 Jul 2024 03:22 )             28.5       07-04    129<L>  |  90<L>  |  108<H>  ----------------------------<  99  3.5   |  17<L>  |  3.88<H>  07-03    130<L>  |  90<L>  |  108<H>  ----------------------------<  129<H>  3.5   |  20<L>  |  3.85<H>    Ca    8.0<L>      04 Jul 2024 03:22  Ca    7.9<L>      03 Jul 2024 23:30  Phos  6.6     07-04  Phos  6.6     07-03  Mg     2.40     07-04  Mg     2.40     07-03    TPro  6.3  /  Alb  3.0<L>  /  TBili  3.3<H>  /  DBili  x   /  AST  176<H>  /  ALT  81<H>  /  AlkPhos  338<H>  07-04  TPro  6.1  /  Alb  3.0<L>  /  TBili  3.3<H>  /  DBili  x   /  AST  173<H>  /  ALT  78<H>  /  AlkPhos  326<H>  07-03    LIVER FUNCTIONS - ( 04 Jul 2024 03:22 )  Alb: 3.0 g/dL / Pro: 6.3 g/dL / ALK PHOS: 338 U/L / ALT: 81 U/L / AST: 176 U/L / GGT: x               CARDIAC MARKERS:            Blood Gas Venous - Lactate: 1.6 mmol/L (07-03 @ 23:30)  Blood Gas Venous - Lactate: 2.2 mmol/L (07-03 @ 16:15)  Blood Gas Venous - Lactate: 2.5 mmol/L (07-03 @ 03:30)          ABG - ( 04 Jul 2024 03:22 )  pH, Arterial: 7.45  pH, Blood: x     /  pCO2: 26    /  pO2: 133   / HCO3: 18    / Base Excess: -4.9  /  SaO2: 99.0              Urinalysis Basic - ( 04 Jul 2024 03:22 )    Color: x / Appearance: x / SG: x / pH: x  Gluc: 99 mg/dL / Ketone: x  / Bili: x / Urobili: x   Blood: x / Protein: x / Nitrite: x   Leuk Esterase: x / RBC: x / WBC x   Sq Epi: x / Non Sq Epi: x / Bacteria: x      CAPILLARY BLOOD GLUCOSE            < from: TTE Echo Complete w/o Contrast w/ Doppler (06.06.24 @ 08:29) >   1. Biatrial enlargement   2. Left ventricular ejection fraction, by visual estimation, is <20%.   3. Severely decreased global left ventricular systolic function.   4. Entire septum, entire apex, and mid anterior segment are abnormal as   described above.   5. Increased LV wall thickness.   6. Mildly increased left ventricular internal cavity size.   7. Right ventricular volume and pressure overload.   8. There is mild concentric left ventricular hypertrophy.   9. Severely enlarged right ventricle.  10. Severely reduced RV systolic function.  11. Moderate mitral valve regurgitation.  12. Moderate-severe tricuspid regurgitation.  13. Mild aortic regurgitation.  14. Mild pulmonic valve regurgitation.  15. Estimated pulmonary artery systolic pressure is 44.6 mmHg assuming a   right atrial pressure of 15 mmHg, which is consistent with mild pulmonary   hypertension.  16. LA volume Index is 38.5 ml/m² ml/m2.            < from: CT Chest No Cont (06.24.24 @ 15:48) >  Since PET/CT 5/5/2024:    Unchanged left upper lobe nodule within the radiation field, however   slightly smaller compared to 4/1/2024. Recommend three-month follow-up to   assess for further change.Unchanged moderate right and new small left pleural effusions.    < end of copied text >            < from: Xray Chest 1 View-PORTABLE IMMEDIATE (Xray Chest 1 View-PORTABLE IMMEDIATE .) (07.01.24 @ 17:43) >  Right central venous catheter with tip in proximal SVC/brachiocephalic   vein area. Left upper extremity PICC with tip in the brachycephalic vein.  Trace bilateral pleural effusions with adjacent passive atelectasis.       Subjective/Objective: patient alert, oriented x3. Pt reports unable to sleep last night  due to anxiety. Currently sleeping after receiving ativan this morning . Off Nipride drip for low Blood Pressure    Overnight event: received ativan 0.25mg overnight with little effect   Tele event: NSR with PVC  60-80    MEDICATIONS    aspirin enteric coated 81 milliGRAM(s) Oral daily  buMETAnide Infusion 2 mG/Hr IV Continuous <Continuous>  clopidogrel Tablet 75 milliGRAM(s) Oral daily  DOBUTamine Infusion 7.5 MICROgram(s)/kG/Min IV Continuous <Continuous>  heparin   Injectable 5000 Unit(s) SubCutaneous every 12 hours  hydrALAZINE 10 milliGRAM(s) Oral every 8 hours  nitroprusside Infusion 0.3 MICROgram(s)/kG/Min IV Continuous <Continuous>  acetaminophen     Tablet .. 325 milliGRAM(s) Oral every 4 hours PRN  FLUoxetine 20 milliGRAM(s) Oral <User Schedule>  LORazepam     Tablet 0.25 milliGRAM(s) Oral at bedtime PRN  melatonin 3 milliGRAM(s) Oral at bedtime  mirtazapine 15 milliGRAM(s) Oral at bedtime  pantoprazole    Tablet 40 milliGRAM(s) Oral before breakfast  atorvastatin 40 milliGRAM(s) Oral at bedtime  chlorhexidine 2% Cloths 1 Application(s) Topical <User Schedule>  latanoprost 0.005% Ophthalmic Solution 1 Drop(s) Both EYES at bedtime  sodium chloride 0.9% lock flush 10 milliLiter(s) IV Push every 1 hour PRN  tamsulosin 0.8 milliGRAM(s) Oral at bedtime              ICU Vital Signs Last 24 Hrs  T(C): 35 (04 Jul 2024 04:00), Max: 35 (04 Jul 2024 04:00)  T(F): 95 (04 Jul 2024 04:00), Max: 95 (04 Jul 2024 04:00)  HR: 77 (04 Jul 2024 08:00) (65 - 84)  ABP: 98/49 (04 Jul 2024 08:00) (78/42 - 107/58)  ABP(mean): 63 (04 Jul 2024 08:00) (55 - 74)  RR: 12 (04 Jul 2024 08:00) (10 - 26)  SpO2: 96% (04 Jul 2024 08:00) (96% - 100%)    O2 Parameters below as of 04 Jul 2024 08:00  Patient On (Oxygen Delivery Method): nasal cannula  O2 Flow (L/min): 2          PHYSICAL EXAMINATION  Appearance: NAD, no distress, weak frail   HEENT: Moist Mucous Membranes, Anicteric, PERRL, EOMI  Cardiovascular: Regular rate and rhythm, Normal S1 S2, No JVD, + murmurs  Respiratory: b/l lungs crackles to auscultation, No rhonchi, No wheezing.  Gastrointestinal:  firm, Non-tender, + BS  Neurologic: Non-focal, A&Ox3  Skin: Warm and dry, No rashes, No ecchymosis, No cyanosis  Musculoskeletal: No clubbing, No cyanosis , 2+ edema mid abd to thigh  Vascular: Peripheral pulses palpable 2+ bilaterally  Psychiatry: Mood & affect appropriate      	    		      I&O's Summary    03 Jul 2024 07:01  -  04 Jul 2024 07:00  --------------------------------------------------------  IN: 1636.1 mL / OUT: 2425 mL / NET: -788.9 mL    04 Jul 2024 07:01  -  04 Jul 2024 08:51  --------------------------------------------------------  IN: 58.4 mL / OUT: 395 mL / NET: -336.6 mL    	     LABS:	  LABORATORY VALUES	 	                          10.1   7.22  )-----------( 123      ( 04 Jul 2024 03:22 )             28.5       07-04    129<L>  |  90<L>  |  108<H>  ----------------------------<  99  3.5   |  17<L>  |  3.88<H>  07-03    130<L>  |  90<L>  |  108<H>  ----------------------------<  129<H>  3.5   |  20<L>  |  3.85<H>    Ca    8.0<L>      04 Jul 2024 03:22  Ca    7.9<L>      03 Jul 2024 23:30  Phos  6.6     07-04  Phos  6.6     07-03  Mg     2.40     07-04  Mg     2.40     07-03    TPro  6.3  /  Alb  3.0<L>  /  TBili  3.3<H>  /  DBili  x   /  AST  176<H>  /  ALT  81<H>  /  AlkPhos  338<H>  07-04  TPro  6.1  /  Alb  3.0<L>  /  TBili  3.3<H>  /  DBili  x   /  AST  173<H>  /  ALT  78<H>  /  AlkPhos  326<H>  07-03    LIVER FUNCTIONS - ( 04 Jul 2024 03:22 )  Alb: 3.0 g/dL / Pro: 6.3 g/dL / ALK PHOS: 338 U/L / ALT: 81 U/L / AST: 176 U/L / GGT: x               CARDIAC MARKERS:            Blood Gas Venous - Lactate: 1.6 mmol/L (07-03 @ 23:30)  Blood Gas Venous - Lactate: 2.2 mmol/L (07-03 @ 16:15)  Blood Gas Venous - Lactate: 2.5 mmol/L (07-03 @ 03:30)          ABG - ( 04 Jul 2024 03:22 )  pH, Arterial: 7.45  pH, Blood: x     /  pCO2: 26    /  pO2: 133   / HCO3: 18    / Base Excess: -4.9  /  SaO2: 99.0              Urinalysis Basic - ( 04 Jul 2024 03:22 )    Color: x / Appearance: x / SG: x / pH: x  Gluc: 99 mg/dL / Ketone: x  / Bili: x / Urobili: x   Blood: x / Protein: x / Nitrite: x   Leuk Esterase: x / RBC: x / WBC x   Sq Epi: x / Non Sq Epi: x / Bacteria: x      CAPILLARY BLOOD GLUCOSE            < from: TTE Echo Complete w/o Contrast w/ Doppler (06.06.24 @ 08:29) >   1. Biatrial enlargement   2. Left ventricular ejection fraction, by visual estimation, is <20%.   3. Severely decreased global left ventricular systolic function.   4. Entire septum, entire apex, and mid anterior segment are abnormal as   described above.   5. Increased LV wall thickness.   6. Mildly increased left ventricular internal cavity size.   7. Right ventricular volume and pressure overload.   8. There is mild concentric left ventricular hypertrophy.   9. Severely enlarged right ventricle.  10. Severely reduced RV systolic function.  11. Moderate mitral valve regurgitation.  12. Moderate-severe tricuspid regurgitation.  13. Mild aortic regurgitation.  14. Mild pulmonic valve regurgitation.  15. Estimated pulmonary artery systolic pressure is 44.6 mmHg assuming a   right atrial pressure of 15 mmHg, which is consistent with mild pulmonary   hypertension.  16. LA volume Index is 38.5 ml/m² ml/m2.            < from: CT Chest No Cont (06.24.24 @ 15:48) >  Since PET/CT 5/5/2024:    Unchanged left upper lobe nodule within the radiation field, however   slightly smaller compared to 4/1/2024. Recommend three-month follow-up to   assess for further change.Unchanged moderate right and new small left pleural effusions.    < end of copied text >            < from: Xray Chest 1 View-PORTABLE IMMEDIATE (Xray Chest 1 View-PORTABLE IMMEDIATE .) (07.01.24 @ 17:43) >  Right central venous catheter with tip in proximal SVC/brachiocephalic   vein area. Left upper extremity PICC with tip in the brachycephalic vein.  Trace bilateral pleural effusions with adjacent passive atelectasis.

## 2024-07-04 NOTE — CHART NOTE - NSCHARTNOTEFT_GEN_A_CORE
Pt's spouse requested discussion for plans for pt. She is aware of pt's critical illness and guarded prognosis and pt is unlikely to make meaningful recovery to be able to have an independent quality of life. She is aware his status has been grave and the medical therapy offering little in improvement. She is wanting to make the patient more comfortable so he can go home to be with family. Tonight she requested to continue current treatment plan and is requesting for pts status to be changed to Comfort measures in AM and evaluation for home hospice so she is able to take pt home with some assistance. She was able to verbalize her intentions to pt who is understanding of his illness and requested palliative to discuss his end of life care in morning.     Code status: DNR/DNI     Christian Chavez PA-C  Department of Cardiology

## 2024-07-04 NOTE — PROGRESS NOTE ADULT - ASSESSMENT
79 year old male known to the [facility] HF clinic (Followed by Dr. Shell), with PMH of CAD with STEMI  with cardiogenic shock and IABP placement,  but was not revascularized due to non viable myocardium, ICM (TTE 24 LVEF 20%, RV systolic dysfunction, moderate MR/TR), LV thrombus, prior lung adenocarcinoma with RUL VATS in 2022 s/p radiation therapy comes in with complaints of SOB, LE and hypotension. Of note- patient has been seen at [facility] 5 x this year for SOB, BRODY and was placed on Dobutamine gtt home infusion. Per patient's wife he was unable to take his diuretics due to hypotension at home for which in hopes of raising BP he was given a salt load with bullions. He continued to worsen, and patient's wife called [facility] HF clinic and he was advised to come to ED for immediate care. In ED patient was found in decompensated HF/ cardiogenic shock, tachypneic tachycardic ( SVT) 130s , hypotensive with SBP 70-90s, and hypoxemic. Pt started on bipap, swan placed, pt started on ionotropes and admitted to CCU    Neuro  # bipolar disorder/depression/anxiety   - AxOX3  - intermittent lethargy likely from shock state  - cont home psych meds : FLUoxetine, mirtazapine   - started on stivan Po as needed for anxiety    Resp  #Hypoxic Resp Failure  - likely from pulm edema i/s of cardiogenic shock  -weaned from NIV to 4LNC   - now breathing comfortably. titrate for sat>92%  - ABG today: pH, Arterial: 7.45  pH, Blood: x     /  pCO2: 26    /  pO2: 133   / HCO3: 18    / Base Excess: -4.9  /  SaO2: 99.0        # history of lung cancer s/p RUL lobectomy (2022) with recurrent disease s/p radiation to left lung (completed 3/2023),   -Stage IV lung carcinoma, s/p local RT, never on systemic chemotherapy        Cardiac  #  chronic systolic congestive heart failure with cardiogenic shock   - TTE  EF 20% with RV systolic dysfx and moderate MR  - 7 AM numbers: CVP 26, CO3.1, CI 1.5, SVR 1282, SVO2 42% this AM  - lactate rise to 9 on   -  increased to 7.5 and bumex drip increased to 2mg/hr and given diuril for oliguria on   -  decrease  back to 5/hr and start Nitroprusside for afterload reduction on   - dobutamine increase back to 7.5mg for elevated lactate  -  started on hydralazine  - lactate today 1.9  - VOL and Low flow status on exam  - Swapnil from Central venous sat 7/3  :CVP 25 CO 4 CI 1.9  CVO2 52.4 Lact 2.5 on  5/Bumex 2,   - Diuril 500mg IV x2, hypertonic 3%   x1, Diamox IV 500mg x1 on 7/3  - strict I/o: IN:  1636.1 mL / OUT: 2425 mL / NET: -788.9 mL        # coronary artery disease   - LHC on 2022 showed RCA  and significant LAD and diagonal lesions-but was not revascularized due to non viable myocardium  - c/w Aspirin an Plavix and Lipitor    #SVT  - SVT at 130 in ED  - likely in setting of cardiogenic shock  -pt recieved amio and on Amio drip now. Now that pt's BP has normalized, no further episodes of SVT.  Amio d/c on      GI  #Transaminitis   - iso of VOL  - abdomen firm  - DASH diet now pt comfortable off NIV  -cont protonix for GI ppx  -            # BRODY on CKD iso cardiorenal  - Baseline creatinine: 2  -worsening crn likely from cardiogenic shock   - inc bumex drip and stat dose of diuril for oilguria and volume overload on   - worsening S creat, today  3  - monitor UO   -avoid nephrotoxins  - pt does not want HD if needed      Heme:  -cbc stable   -HSQ for DVT ppx    Endo:   -A1c 6.2  -TSH on  wnl   -maintain gluc<180 on bmp    LIne:   RIJ cordis on    Left arm double lumen PIcc line with no blood return->  placed on  at [facility]    Left upper extremity PICC with tip in the brachycephalic vein on Cxr        #Ethics:  -reconfirmed with pt and wife at bedside that pt is DNR/DNI and doesnt want HD. We explained that we are trying our best to optimize his cardiogenic shock but his prognosis is poor with his worsening lactic acidosis and renal failure. Pt refuse hospice care 79 year old male known to the [facility] HF clinic (Followed by Dr. Shell), with PMH of CAD with STEMI  with cardiogenic shock and IABP placement,  but was not revascularized due to non viable myocardium, ICM (TTE 24 LVEF 20%, RV systolic dysfunction, moderate MR/TR), LV thrombus, prior lung adenocarcinoma with RUL VATS in 2022 s/p radiation therapy comes in with complaints of SOB, LE and hypotension. Of note- patient has been seen at [facility] 5 x this year for SOB, BRODY and was placed on Dobutamine gtt home infusion. Per patient's wife he was unable to take his diuretics due to hypotension at home for which in hopes of raising BP he was given a salt load with bullions. He continued to worsen, and patient's wife called [facility] HF clinic and he was advised to come to ED for immediate care. In ED patient was found in decompensated HF/ cardiogenic shock, tachypneic tachycardic ( SVT) 130s , hypotensive with SBP 70-90s, and hypoxemic. Pt started on bipap, swan placed, pt started on ionotropes and admitted to CCU    Neuro  # bipolar disorder/depression/anxiety   - AxOX3  - intermittent lethargy likely from shock state  - cont home psych meds : FLUoxetine, mirtazapine   - started on stivan Po as needed for anxiety    Resp  #Hypoxic Resp Failure  - likely from pulm edema i/s of cardiogenic shock  -weaned from NIV to 4LNC   - now breathing comfortably. titrate for sat>92%  - ABG today: pH, Arterial: 7.45  pH, Blood: x     /  pCO2: 26    /  pO2: 133   / HCO3: 18    / Base Excess: -4.9  /  SaO2: 99.0        # history of lung cancer s/p RUL lobectomy (2022) with recurrent disease s/p radiation to left lung (completed 3/2023),   -Stage IV lung carcinoma, s/p local RT, never on systemic chemotherapy        Cardiac  #  chronic systolic congestive heart failure with cardiogenic shock   - TTE  EF 20% with RV systolic dysfx and moderate MR  - 7 AM numbers: CVP 26, CO3.1, CI 1.5, SVR 1282, SVO2 42% this AM  - lactate rise to 9 on   -  increased to 7.5 and bumex drip increased to 2mg/hr and given diuril for oliguria on   -  decrease  back to 5/hr and start Nitroprusside for afterload reduction on   - dobutamine increase back to 7.5mg for elevated lactate  -  started on hydralazine  - lactate today 1.9  - VOL and Low flow status on exam  - Swapnil from Central venous sat 7/3  :CVP 25 CO 4 CI 1.9  CVO2 52.4 Lact 2.5 on  5/Bumex 2,   - Diuril 500mg IV x2, hypertonic 3%   x1, Diamox IV 500mg x1 on 7/3  - Swapnil ; CVP 25, CO 4, CI 2,   - strict I/o: IN:  1636.1 mL / OUT: 2425 mL / NET: -788.9 mL        # coronary artery disease   - LHC on 2022 showed RCA  and significant LAD and diagonal lesions-but was not revascularized due to non viable myocardium  - c/w Aspirin an Plavix and Lipitor    #SVT  - SVT at 130 in ED  - likely in setting of cardiogenic shock  -pt recieved amio and on Amio drip now. Now that pt's BP has normalized, no further episodes of SVT.  Amio d/c on      GI  #Transaminitis   - iso of VOL  - abdomen firm  - DASH diet now pt comfortable off NIV  -cont protonix for GI ppx  -            # BRODY on CKD iso cardiorenal  - Baseline creatinine: 2  -worsening crn likely from cardiogenic shock   - inc bumex drip and stat dose of diuril for oilguria and volume overload on   - worsening S creat, today  3  - monitor UO   -avoid nephrotoxins  - pt does not want HD if needed      Heme:  -cbc stable   -HSQ for DVT ppx    Endo:   -A1c 6.2  -TSH on  wnl   -maintain gluc<180 on bmp    LIne:   RIEMMANUEL cordis on    Left arm double lumen PIcc line with no blood return->  placed on  at [facility]    Left upper extremity PICC with tip in the brachycephalic vein on Cxr        #Ethics:  -reconfirmed with pt and wife at bedside that pt is DNR/DNI and doesnt want HD. We explained that we are trying our best to optimize his cardiogenic shock but his prognosis is poor with his worsening lactic acidosis and renal failure. Pt refuse hospice care 79 year old male known to the [facility] HF clinic (Followed by Dr. Shell), with PMH of CAD with STEMI  with cardiogenic shock and IABP placement,  but was not revascularized due to non viable myocardium, ICM (TTE 24 LVEF 20%, RV systolic dysfunction, moderate MR/TR), LV thrombus, prior lung adenocarcinoma with RUL VATS in 2022 s/p radiation therapy comes in with complaints of SOB, LE and hypotension. Of note- patient has been seen at [facility] 5 x this year for SOB, BRODY and was placed on Dobutamine gtt home infusion. Per patient's wife he was unable to take his diuretics due to hypotension at home for which in hopes of raising BP he was given a salt load with bullions. He continued to worsen, and patient's wife called [facility] HF clinic and he was advised to come to ED for immediate care. In ED patient was found in decompensated HF/ cardiogenic shock, tachypneic tachycardic ( SVT) 130s , hypotensive with SBP 70-90s, and hypoxemic. Pt started on bipap, swan placed, pt started on ionotropes and admitted to CCU    Neuro  # bipolar disorder/depression/anxiety   - AxOX3  - intermittent lethargy likely from shock state  - cont home psych meds : FLUoxetine, mirtazapine   - started on stivan Po as needed for anxiety    Resp  #Hypoxic Resp Failure  - likely from pulm edema i/s of cardiogenic shock  -weaned from NIV to 4LNC   - now breathing comfortably. titrate for sat>92%  - ABG today: pH, Arterial: 7.45  pH, Blood: x     /  pCO2: 26    /  pO2: 133   / HCO3: 18    / Base Excess: -4.9  /  SaO2: 99.0        # history of lung cancer s/p RUL lobectomy (2022) with recurrent disease s/p radiation to left lung (completed 3/2023),   -Stage IV lung carcinoma, s/p local RT, never on systemic chemotherapy        Cardiac  #  chronic systolic congestive heart failure with cardiogenic shock   - TTE  EF 20% with RV systolic dysfx and moderate MR  - 7 AM numbers: CVP 26, CO3.1, CI 1.5, SVR 1282, SVO2 42% this AM  - lactate rise to 9 on   -  increased to 7.5 and bumex drip increased to 2mg/hr and given diuril for oliguria on   -  decrease  back to 5/hr and start Nitroprusside for afterload reduction on   - dobutamine increase back to 7.5mg for elevated lactate  -  started on hydralazine  - lactate today 1.9  - VOL and Low flow status on exam  - Swapnil from Central venous sat 7/3  :CVP 25 CO 4 CI 1.9  CVO2 52.4 Lact 2.5 on  5/Bumex 2,   - Diuril 500mg IV x2, hypertonic 3%   x1, Diamox IV 500mg x1 on 7/3  - Nipride drip held  overnight on 7/3-, but restart in the morning  - Swapnil ; CVP 25, CO 4, CI 2,   - strict I/o: IN:  1636.1 mL / OUT: 2425 mL / NET: -788.9 mL        # coronary artery disease   - LHC on 2022 showed RCA  and significant LAD and diagonal lesions-but was not revascularized due to non viable myocardium  - c/w Aspirin an Plavix and Lipitor    #SVT  - SVT at 130 in ED  - likely in setting of cardiogenic shock  -pt recieved amio and on Amio drip now. Now that pt's BP has normalized, no further episodes of SVT.  Amio d/c on      GI  #Transaminitis   - iso of VOL  - abdomen firm  - DASH diet now pt comfortable off NIV  -cont protonix for GI ppx  -            # BRODY on CKD iso cardiorenal  - Baseline creatinine: 2  -worsening crn likely from cardiogenic shock   - inc bumex drip and stat dose of diuril for oilguria and volume overload on   - worsening S creat, today  3  - monitor UO   -avoid nephrotoxins  - pt does not want HD if needed      Heme:  -cbc stable   -HSQ for DVT ppx    Endo:   -A1c 6.2  -TSH on  wnl   -maintain gluc<180 on bmp    LIne:   RIJ cordis on    Left arm double lumen PIcc line with no blood return->  placed on  at [facility]    Left upper extremity PICC with tip in the brachycephalic vein on Cxr        #Ethics:  -reconfirmed with pt and wife at bedside that pt is DNR/DNI and doesnt want HD. We explained that we are trying our best to optimize his cardiogenic shock but his prognosis is poor with his worsening lactic acidosis and renal failure. Pt refuse hospice care

## 2024-07-05 ENCOUNTER — TRANSCRIPTION ENCOUNTER (OUTPATIENT)
Age: 79
End: 2024-07-05

## 2024-07-05 DIAGNOSIS — R52 PAIN, UNSPECIFIED: ICD-10-CM

## 2024-07-05 LAB
ALBUMIN SERPL ELPH-MCNC: 2.9 G/DL — LOW (ref 3.3–5)
ALBUMIN SERPL ELPH-MCNC: 3 G/DL — LOW (ref 3.3–5)
ALBUMIN SERPL ELPH-MCNC: 3.1 G/DL — LOW (ref 3.3–5)
ALP SERPL-CCNC: 329 U/L — HIGH (ref 40–120)
ALP SERPL-CCNC: 343 U/L — HIGH (ref 40–120)
ALP SERPL-CCNC: 343 U/L — HIGH (ref 40–120)
ALT FLD-CCNC: 66 U/L — HIGH (ref 4–41)
ALT FLD-CCNC: 66 U/L — HIGH (ref 4–41)
ALT FLD-CCNC: 73 U/L — HIGH (ref 4–41)
ANION GAP SERPL CALC-SCNC: 19 MMOL/L — HIGH (ref 7–14)
ANION GAP SERPL CALC-SCNC: 19 MMOL/L — HIGH (ref 7–14)
ANION GAP SERPL CALC-SCNC: 21 MMOL/L — HIGH (ref 7–14)
AST SERPL-CCNC: 127 U/L — HIGH (ref 4–40)
AST SERPL-CCNC: 133 U/L — HIGH (ref 4–40)
AST SERPL-CCNC: 150 U/L — HIGH (ref 4–40)
BASE EXCESS BLDV CALC-SCNC: -0.4 MMOL/L — SIGNIFICANT CHANGE UP (ref -2–3)
BASE EXCESS BLDV CALC-SCNC: -0.7 MMOL/L — SIGNIFICANT CHANGE UP (ref -2–3)
BILIRUB SERPL-MCNC: 3.1 MG/DL — HIGH (ref 0.2–1.2)
BILIRUB SERPL-MCNC: 3.3 MG/DL — HIGH (ref 0.2–1.2)
BILIRUB SERPL-MCNC: 3.3 MG/DL — HIGH (ref 0.2–1.2)
BLOOD GAS ARTERIAL COMPREHENSIVE RESULT: SIGNIFICANT CHANGE UP
BLOOD GAS VENOUS COMPREHENSIVE RESULT: SIGNIFICANT CHANGE UP
BLOOD GAS VENOUS COMPREHENSIVE RESULT: SIGNIFICANT CHANGE UP
BUN SERPL-MCNC: 101 MG/DL — HIGH (ref 7–23)
BUN SERPL-MCNC: 105 MG/DL — HIGH (ref 7–23)
BUN SERPL-MCNC: 108 MG/DL — HIGH (ref 7–23)
CALCIUM SERPL-MCNC: 7.3 MG/DL — LOW (ref 8.4–10.5)
CALCIUM SERPL-MCNC: 7.6 MG/DL — LOW (ref 8.4–10.5)
CALCIUM SERPL-MCNC: 7.7 MG/DL — LOW (ref 8.4–10.5)
CHLORIDE BLDV-SCNC: 89 MMOL/L — LOW (ref 96–108)
CHLORIDE BLDV-SCNC: 91 MMOL/L — LOW (ref 96–108)
CHLORIDE SERPL-SCNC: 87 MMOL/L — LOW (ref 98–107)
CHLORIDE SERPL-SCNC: 87 MMOL/L — LOW (ref 98–107)
CHLORIDE SERPL-SCNC: 89 MMOL/L — LOW (ref 98–107)
CO2 BLDV-SCNC: 25.3 MMOL/L — SIGNIFICANT CHANGE UP (ref 22–26)
CO2 BLDV-SCNC: 25.4 MMOL/L — SIGNIFICANT CHANGE UP (ref 22–26)
CO2 SERPL-SCNC: 19 MMOL/L — LOW (ref 22–31)
CO2 SERPL-SCNC: 21 MMOL/L — LOW (ref 22–31)
CO2 SERPL-SCNC: 22 MMOL/L — SIGNIFICANT CHANGE UP (ref 22–31)
CREAT SERPL-MCNC: 3.7 MG/DL — HIGH (ref 0.5–1.3)
CREAT SERPL-MCNC: 3.79 MG/DL — HIGH (ref 0.5–1.3)
CREAT SERPL-MCNC: 3.95 MG/DL — HIGH (ref 0.5–1.3)
EGFR: 15 ML/MIN/1.73M2 — LOW
EGFR: 15 ML/MIN/1.73M2 — LOW
EGFR: 16 ML/MIN/1.73M2 — LOW
GAS PNL BLDV: 125 MMOL/L — LOW (ref 136–145)
GAS PNL BLDV: 126 MMOL/L — LOW (ref 136–145)
GLUCOSE BLDV-MCNC: 124 MG/DL — HIGH (ref 70–99)
GLUCOSE BLDV-MCNC: 81 MG/DL — SIGNIFICANT CHANGE UP (ref 70–99)
GLUCOSE SERPL-MCNC: 130 MG/DL — HIGH (ref 70–99)
GLUCOSE SERPL-MCNC: 87 MG/DL — SIGNIFICANT CHANGE UP (ref 70–99)
GLUCOSE SERPL-MCNC: 97 MG/DL — SIGNIFICANT CHANGE UP (ref 70–99)
HCO3 BLDV-SCNC: 24 MMOL/L — SIGNIFICANT CHANGE UP (ref 22–29)
HCO3 BLDV-SCNC: 24 MMOL/L — SIGNIFICANT CHANGE UP (ref 22–29)
HCT VFR BLD CALC: 29.4 % — LOW (ref 39–50)
HCT VFR BLDA CALC: 29 % — LOW (ref 39–51)
HCT VFR BLDA CALC: 32 % — LOW (ref 39–51)
HGB BLD CALC-MCNC: 10.5 G/DL — LOW (ref 12.6–17.4)
HGB BLD CALC-MCNC: 9.5 G/DL — LOW (ref 12.6–17.4)
HGB BLD-MCNC: 10.1 G/DL — LOW (ref 13–17)
LACTATE BLDV-MCNC: 1.5 MMOL/L — SIGNIFICANT CHANGE UP (ref 0.5–2)
LACTATE BLDV-MCNC: 2.3 MMOL/L — HIGH (ref 0.5–2)
MAGNESIUM SERPL-MCNC: 2.1 MG/DL — SIGNIFICANT CHANGE UP (ref 1.6–2.6)
MAGNESIUM SERPL-MCNC: 2.3 MG/DL — SIGNIFICANT CHANGE UP (ref 1.6–2.6)
MAGNESIUM SERPL-MCNC: 2.3 MG/DL — SIGNIFICANT CHANGE UP (ref 1.6–2.6)
MCHC RBC-ENTMCNC: 29.3 PG — SIGNIFICANT CHANGE UP (ref 27–34)
MCHC RBC-ENTMCNC: 34.4 GM/DL — SIGNIFICANT CHANGE UP (ref 32–36)
MCV RBC AUTO: 85.2 FL — SIGNIFICANT CHANGE UP (ref 80–100)
NRBC # BLD: 0 /100 WBCS — SIGNIFICANT CHANGE UP (ref 0–0)
NRBC # FLD: 0.03 K/UL — HIGH (ref 0–0)
PCO2 BLDV: 38 MMHG — LOW (ref 42–55)
PCO2 BLDV: 40 MMHG — LOW (ref 42–55)
PH BLDV: 7.39 — SIGNIFICANT CHANGE UP (ref 7.32–7.43)
PH BLDV: 7.41 — SIGNIFICANT CHANGE UP (ref 7.32–7.43)
PHOSPHATE SERPL-MCNC: 5.4 MG/DL — HIGH (ref 2.5–4.5)
PHOSPHATE SERPL-MCNC: 6.2 MG/DL — HIGH (ref 2.5–4.5)
PHOSPHATE SERPL-MCNC: 6.2 MG/DL — HIGH (ref 2.5–4.5)
PLATELET # BLD AUTO: 116 K/UL — LOW (ref 150–400)
PO2 BLDV: 34 MMHG — SIGNIFICANT CHANGE UP (ref 25–45)
PO2 BLDV: 40 MMHG — SIGNIFICANT CHANGE UP (ref 25–45)
POTASSIUM BLDV-SCNC: 2.6 MMOL/L — CRITICAL LOW (ref 3.5–5.1)
POTASSIUM BLDV-SCNC: 3.1 MMOL/L — LOW (ref 3.5–5.1)
POTASSIUM SERPL-MCNC: 2.9 MMOL/L — CRITICAL LOW (ref 3.5–5.3)
POTASSIUM SERPL-MCNC: 3.2 MMOL/L — LOW (ref 3.5–5.3)
POTASSIUM SERPL-MCNC: 3.2 MMOL/L — LOW (ref 3.5–5.3)
POTASSIUM SERPL-SCNC: 2.9 MMOL/L — CRITICAL LOW (ref 3.5–5.3)
POTASSIUM SERPL-SCNC: 3.2 MMOL/L — LOW (ref 3.5–5.3)
POTASSIUM SERPL-SCNC: 3.2 MMOL/L — LOW (ref 3.5–5.3)
PROT SERPL-MCNC: 5.9 G/DL — LOW (ref 6–8.3)
PROT SERPL-MCNC: 6.3 G/DL — SIGNIFICANT CHANGE UP (ref 6–8.3)
PROT SERPL-MCNC: 6.5 G/DL — SIGNIFICANT CHANGE UP (ref 6–8.3)
RBC # BLD: 3.45 M/UL — LOW (ref 4.2–5.8)
RBC # FLD: 17.1 % — HIGH (ref 10.3–14.5)
SAO2 % BLDV: 57 % — LOW (ref 67–88)
SAO2 % BLDV: 66.5 % — LOW (ref 67–88)
SODIUM SERPL-SCNC: 127 MMOL/L — LOW (ref 135–145)
SODIUM SERPL-SCNC: 128 MMOL/L — LOW (ref 135–145)
SODIUM SERPL-SCNC: 129 MMOL/L — LOW (ref 135–145)
WBC # BLD: 10.6 K/UL — HIGH (ref 3.8–10.5)
WBC # FLD AUTO: 10.6 K/UL — HIGH (ref 3.8–10.5)

## 2024-07-05 PROCEDURE — 99291 CRITICAL CARE FIRST HOUR: CPT

## 2024-07-05 PROCEDURE — 99291 CRITICAL CARE FIRST HOUR: CPT | Mod: FS

## 2024-07-05 PROCEDURE — G0316 PROLONG INPT EVAL ADD15 M: CPT

## 2024-07-05 PROCEDURE — 99233 SBSQ HOSP IP/OBS HIGH 50: CPT

## 2024-07-05 RX ORDER — POTASSIUM CHLORIDE 600 MG/1
20 TABLET, FILM COATED, EXTENDED RELEASE ORAL
Refills: 0 | Status: COMPLETED | OUTPATIENT
Start: 2024-07-05 | End: 2024-07-05

## 2024-07-05 RX ORDER — ACETAMINOPHEN 325 MG
1000 TABLET ORAL ONCE
Refills: 0 | Status: COMPLETED | OUTPATIENT
Start: 2024-07-05 | End: 2024-07-05

## 2024-07-05 RX ORDER — POTASSIUM CHLORIDE 600 MG/1
40 TABLET, FILM COATED, EXTENDED RELEASE ORAL ONCE
Refills: 0 | Status: COMPLETED | OUTPATIENT
Start: 2024-07-05 | End: 2024-07-05

## 2024-07-05 RX ORDER — ROSUVASTATIN CALCIUM 20 MG/1
20 TABLET ORAL AT BEDTIME
Refills: 0 | Status: DISCONTINUED | OUTPATIENT
Start: 2024-07-05 | End: 2024-07-11

## 2024-07-05 RX ORDER — LORAZEPAM 0.5 MG
0.5 TABLET ORAL ONCE
Refills: 0 | Status: DISCONTINUED | OUTPATIENT
Start: 2024-07-05 | End: 2024-07-05

## 2024-07-05 RX ORDER — POTASSIUM CHLORIDE 600 MG/1
40 TABLET, FILM COATED, EXTENDED RELEASE ORAL EVERY 4 HOURS
Refills: 0 | Status: COMPLETED | OUTPATIENT
Start: 2024-07-05 | End: 2024-07-05

## 2024-07-05 RX ORDER — LIDOCAINE HCL 28 MG/G
1 GEL TOPICAL EVERY 24 HOURS
Refills: 0 | Status: DISCONTINUED | OUTPATIENT
Start: 2024-07-05 | End: 2024-07-11

## 2024-07-05 RX ORDER — OXYCODONE HYDROCHLORIDE 100 MG/5ML
2.5 SOLUTION ORAL ONCE
Refills: 0 | Status: DISCONTINUED | OUTPATIENT
Start: 2024-07-05 | End: 2024-07-05

## 2024-07-05 RX ADMIN — POTASSIUM CHLORIDE 40 MILLIEQUIVALENT(S): 600 TABLET, FILM COATED, EXTENDED RELEASE ORAL at 04:14

## 2024-07-05 RX ADMIN — POTASSIUM CHLORIDE 100 MILLIEQUIVALENT(S): 600 TABLET, FILM COATED, EXTENDED RELEASE ORAL at 17:19

## 2024-07-05 RX ADMIN — POTASSIUM CHLORIDE 100 MILLIEQUIVALENT(S): 600 TABLET, FILM COATED, EXTENDED RELEASE ORAL at 18:15

## 2024-07-05 RX ADMIN — POTASSIUM CHLORIDE 40 MILLIEQUIVALENT(S): 600 TABLET, FILM COATED, EXTENDED RELEASE ORAL at 11:20

## 2024-07-05 RX ADMIN — LIDOCAINE HCL 1 PATCH: 28 GEL TOPICAL at 01:08

## 2024-07-05 RX ADMIN — HEPARIN SODIUM 5000 UNIT(S): 50 INJECTION, SOLUTION INTRAVENOUS at 05:19

## 2024-07-05 RX ADMIN — LIDOCAINE HCL 1 PATCH: 28 GEL TOPICAL at 00:43

## 2024-07-05 RX ADMIN — PANTOPRAZOLE SODIUM 40 MILLIGRAM(S): 40 INJECTION, POWDER, FOR SOLUTION INTRAVENOUS at 05:19

## 2024-07-05 RX ADMIN — CLOPIDOGREL BISULFATE 75 MILLIGRAM(S): 75 TABLET, FILM COATED ORAL at 11:19

## 2024-07-05 RX ADMIN — POTASSIUM CHLORIDE 40 MILLIEQUIVALENT(S): 600 TABLET, FILM COATED, EXTENDED RELEASE ORAL at 17:34

## 2024-07-05 RX ADMIN — Medication 1000 MILLIGRAM(S): at 04:43

## 2024-07-05 RX ADMIN — Medication 19.2 MICROGRAM(S)/KG/MIN: at 13:28

## 2024-07-05 RX ADMIN — Medication 325 MILLIGRAM(S): at 00:23

## 2024-07-05 RX ADMIN — Medication 400 MILLIGRAM(S): at 04:13

## 2024-07-05 RX ADMIN — MIRTAZAPINE 15 MILLIGRAM(S): 15 TABLET, FILM COATED ORAL at 22:13

## 2024-07-05 RX ADMIN — Medication 20 MILLIGRAM(S): at 05:19

## 2024-07-05 RX ADMIN — LIDOCAINE HCL 1 PATCH: 28 GEL TOPICAL at 11:34

## 2024-07-05 RX ADMIN — Medication 1 APPLICATION(S): at 05:27

## 2024-07-05 RX ADMIN — HYDRALAZINE HYDROCHLORIDE 10 MILLIGRAM(S): 50 TABLET ORAL at 14:18

## 2024-07-05 RX ADMIN — POTASSIUM CHLORIDE 40 MILLIEQUIVALENT(S): 600 TABLET, FILM COATED, EXTENDED RELEASE ORAL at 22:13

## 2024-07-05 RX ADMIN — Medication 5 MILLIGRAM(S): at 09:23

## 2024-07-05 RX ADMIN — OXYCODONE HYDROCHLORIDE 2.5 MILLIGRAM(S): 100 SOLUTION ORAL at 14:24

## 2024-07-05 RX ADMIN — HEPARIN SODIUM 5000 UNIT(S): 50 INJECTION, SOLUTION INTRAVENOUS at 17:19

## 2024-07-05 RX ADMIN — TAMSULOSIN HYDROCHLORIDE 0.8 MILLIGRAM(S): 0.4 CAPSULE ORAL at 22:13

## 2024-07-05 RX ADMIN — Medication 0.25 MILLIGRAM(S): at 22:30

## 2024-07-05 RX ADMIN — Medication 3 MILLIGRAM(S): at 22:13

## 2024-07-05 RX ADMIN — OXYCODONE HYDROCHLORIDE 2.5 MILLIGRAM(S): 100 SOLUTION ORAL at 15:09

## 2024-07-05 RX ADMIN — ASPIRIN 81 MILLIGRAM(S): 325 TABLET, FILM COATED ORAL at 11:19

## 2024-07-05 NOTE — PROGRESS NOTE ADULT - PROBLEM SELECTOR PLAN 3
- Patient with mild anxiety impacting sleep  - Per wife, patient with good relief from ativan at night, but has intermittent anxiety during daytime  - Can Liberate to PO Ativan 0.25mg q6 PRN anxiety (hold for oversedation, respiratory depression)

## 2024-07-05 NOTE — PROGRESS NOTE ADULT - SUBJECTIVE AND OBJECTIVE BOX
Date of Service  : 7/5/2024  Indication for Geriatrics and Palliative Care Services:  goals of care    SUBJECTIVE AND OBJECTIVE:  Patient seen and examined at bedside. Patient with restlessness due to pain in his back and legs; he states pain in leg is cramping in nature and back pain is worse with certain positions. Wife also endorses patient having anxiety.     INTERVAL HPI/OVERNIGHT EVENTS:  Nitroprusside now discontinued. Per primary team, dobutamine gtt increased yesterday     Allergies    No Known Allergies    Intolerances    MEDICATIONS  (STANDING):  aspirin enteric coated 81 milliGRAM(s) Oral daily  buMETAnide Infusion 1 mG/Hr (5 mL/Hr) IV Continuous <Continuous>  chlorhexidine 2% Cloths 1 Application(s) Topical <User Schedule>  clopidogrel Tablet 75 milliGRAM(s) Oral daily  DOBUTamine Infusion 7.5 MICROgram(s)/kG/Min (19.2 mL/Hr) IV Continuous <Continuous>  FLUoxetine 20 milliGRAM(s) Oral <User Schedule>  heparin   Injectable 5000 Unit(s) SubCutaneous every 12 hours  hydrALAZINE 10 milliGRAM(s) Oral every 8 hours  latanoprost 0.005% Ophthalmic Solution 1 Drop(s) Both EYES at bedtime  lidocaine 4% Topical Solution 1 Application(s) Topical daily  LORazepam     Tablet 0.25 milliGRAM(s) Oral at bedtime  melatonin 3 milliGRAM(s) Oral at bedtime  mirtazapine 15 milliGRAM(s) Oral at bedtime  pantoprazole    Tablet 40 milliGRAM(s) Oral before breakfast  rosuvastatin 20 milliGRAM(s) Oral at bedtime  tamsulosin 0.8 milliGRAM(s) Oral at bedtime    MEDICATIONS  (PRN):  acetaminophen     Tablet .. 325 milliGRAM(s) Oral every 4 hours PRN Moderate Pain (4 - 6)  lidocaine   4% Patch 1 Patch Transdermal every 24 hours PRN pain  LORazepam     Tablet 0.25 milliGRAM(s) Oral at bedtime PRN Anxiety  sodium chloride 0.9% lock flush 10 milliLiter(s) IV Push every 1 hour PRN Pre/post blood products, medications, blood draw, and to maintain line patency      ITEMS UNCHECKED ARE NOT PRESENT    PRESENT SYMPTOMS: [ ]Unable to self-report due to altered mental status- see [ ] CPOT [ ] PAINADS [ ] RDOS  Source if other than patient:  [ ]Family   [ ]Team     Pain:  [x ]yes [ ]no  QOL impact - moderate  Location -       back, legs              Aggravating factors - positional   Quality - aching in back, cramping in leg   Radiation - as above   Timing- intermittent   Severity (0-10 scale): 5  Minimal acceptable level / Pain Goal (0-10 scale): 2    Dyspnea:                           [ ]Mild [ ]Moderate [ ]Severe  Anxiety:                             [x ]Mild [ ]Moderate [ ]Severe  Agitation:                          [ ]Mild [ ]Moderate [ ]Severe  Fatigue:                             [ ]Mild [ ]Moderate [ ]Severe  Nausea:                             [ ]Mild [ ]Moderate [ ]Severe  Loss of appetite:              [ ]Mild [ ]Moderate [ ]Severe  Constipation:                   [ ]Mild [ ]Moderate [ ]Severe  Diarrhea:                          [ ]Mild [ ]Moderate [ ]Severe    CPOT:    https://www.[facility].org/getattachment/[email or web address]/Critical-Care-Pain-Observation-Tool-(CPOT)    PCSSQ[Palliative Care Spiritual Screening Question]   Severity (0-10):  Score of 4 or > indicate consideration of Chaplaincy referral.  Chaplaincy Referral: [ ] yes [ ] refused [x ] following [ ] deferred    Caregiver Allen? : [ ] yes [ ] no [ ] Declined [x ] Deferred              Social work referral [ ] Patient & Family Centered Care Referral [ ]     Anticipatory Grief present?:  [x ] yes [ ] no  [ ] Deferred                  Social work referral [ ] Chaplaincy Referral[ ]    Other Symptoms:  [x ]All other review of systems negative , except orthopnea   [ ] Unable to obtain due to poor mentation     PHYSICAL EXAM:  Vital Signs Last 24 Hrs  T(C): 35.1 (05 Jul 2024 12:00), Max: 36.6 (04 Jul 2024 20:00)  T(F): 95.2 (05 Jul 2024 12:00), Max: 97.8 (04 Jul 2024 20:00)  HR: 70 (05 Jul 2024 15:00) (64 - 79)  BP: --  BP(mean): --  RR: 17 (05 Jul 2024 15:00) (9 - 23)  SpO2: 97% (05 Jul 2024 15:00) (95% - 100%)    Parameters below as of 05 Jul 2024 06:00  Patient On (Oxygen Delivery Method): nasal cannula  O2 Flow (L/min): 2       GENERAL:  [x ]Alert  [x ]Oriented x  3 [ ]Lethargic  [ ]Cachexia  [ ]Unarousable  [ x]Verbal  [ ]Non-Verbal  [x ] No Distress  Behavioral:   [ ] Anxiety  [ ] Delirium [ ] Agitation [x ] Restlessness   HEENT:  [ x]Normal  [ ] Temporal Wasting  [ ]Dry mouth   [ ]ET Tube/Trach  [ ]Oral lesions  [ ] Mucositis  PULMONARY: unlabored breathing, no accessory muscle use   [ ]Clear [ ]Tachypnea   [ ]Audible excessive secretions   [ ]Rhonchi        [ ]Right [ ]Left [ ]Bilateral  [ ]Crackles        [ ]Right [ ]Left [ ]Bilateral  [ ]Wheezing     [ ]Right [ ]Left [ ]Bilateral  [ ]Diminished breath sounds [ ]right [ ]left [ ]bilateral  CARDIOVASCULAR:    [x ]Regular [ ]Irregular [ ]Tachy  [ ]Christiano [ ]Murmur [ ]Other  GASTROINTESTINAL:  [ x]Soft  [ ]Distended   [ ]+BS  [ x]Non tender [ ]Tender  [ ]PEG [ ]OGT/ NGT  Last BM:   GENITOURINARY:  [x ]Normal [ ] Incontinent   [ ]Oliguria/Anuria   [ ]Chandler  MUSCULOSKELETAL:   [x ]Normal   [ ]Weakness  [ ]Bed/Wheelchair bound [x ]Edema in b/l lower extremities   [  ] contraction  NEUROLOGIC:   [ x]No focal deficits  [ ]Cognitive impairment  [ ]Dysphagia [ ]Dysarthria [ ]Paresis [ ]Other   SKIN: See Nursing Skin Assessment for further details  [x]Normal    [ ]Rash  [ ]Pressure ulcer(s)       Present on admission [ ]y [ ]n   [  ]  Wound    [  ] hyperpigmentation      CRITICAL CARE:  [x ]Shock Present  [ ]Septic [x ]Cardiogenic [ ]Neurologic [ ]Hypovolemic  [ ]Vasopressors [ x]Inotropes  [ ]Respiratory failure present [ ]Mechanical Ventilation [ ]Non-invasive ventilatory support [ ]High-Flow   [ ]Acute  [ ]Chronic [ ]Hypoxic  [ ]Hypercarbic [ ]Other  [ x]Other organ failure - renal    LABS:  reviewed                                      10.1   10.60 )-----------( 116      ( 05 Jul 2024 01:30 )             29.4       07-05    127<L>  |  87<L>  |  105<H>  ----------------------------<  130<H>  3.2<L>   |  19<L>  |  3.95<H>    Ca    7.7<L>      05 Jul 2024 01:30  Phos  6.2     07-05  Mg     2.30     07-05    TPro  6.5  /  Alb  3.0<L>  /  TBili  3.3<H>  /  DBili  x   /  AST  150<H>  /  ALT  73<H>  /  AlkPhos  343<H>  07-05        RADIOLOGY & ADDITIONAL STUDIES: reviewed     Protein Calorie Malnutrition Present: [ ]mild [ ]moderate [ ]severe [ ]underweight [ ]morbid obesity  https://www.andeal.org/vault/4130/web/files/ONC/Table_Clinical%20Characteristics%20to%20Document%20Malnutrition-White%20JV%20et%20al%202012.pdf    Height (cm): 182.9 (07-01-24 @ 13:27), 182.9 (06-07-24 @ 12:30), 182.9 (05-30-24 @ 07:31)  Weight (kg): 85.2 (07-01-24 @ 18:00), 86.2 (06-14-24 @ 10:00), 84.4 (05-30-24 @ 07:31)  BMI (kg/m2): 25.5 (07-01-24 @ 18:00), 25.8 (07-01-24 @ 13:27), 25.8 (06-14-24 @ 10:00)    [ ]PPSV2 < or = 30%  [ ]significant weight loss [ ]poor nutritional intake [ ]anasarca   [ ]Artificial Nutrition    REFERRALS:   [ ]Chaplaincy  [ ]Hospice  [ ]Child Life  [ ]Social Work  [ x]Case management [ ]Holistic Therapy

## 2024-07-05 NOTE — DISCHARGE NOTE PROVIDER - CARE PROVIDER_API CALL
Jay Shell  [facility]  [address] [place] [address], [address]  [place] [address]  Phone: [phone]  Fax: [phone]  Follow Up Time: 2 weeks   Jay Shell  [facility]  [address] [address] [place] [address], [address]  [place] [address]  Phone: [phone]  Fax: [phone]  Follow Up Time: 2 weeks    Jori Liz  Urology  [address] [place] [address], [address]  [place] [address]  Phone: [phone]  Fax: [phone]  Follow Up Time: 1 week

## 2024-07-05 NOTE — DISCHARGE NOTE PROVIDER - PROVIDER TOKENS
PROVIDER:[TOKEN:[[id number]],FOLLOWUP:[2 weeks]] PROVIDER:[TOKEN:[[id number]],FOLLOWUP:[2 weeks]],PROVIDER:[TOKEN:[[id number]],FOLLOWUP:[1 week]]

## 2024-07-05 NOTE — DISCHARGE NOTE PROVIDER - NSDCCPCAREPLAN_GEN_ALL_CORE_FT
PRINCIPAL DISCHARGE DIAGNOSIS  Diagnosis: Heart failure  Assessment and Plan of Treatment: end stage heart failure

## 2024-07-05 NOTE — PROGRESS NOTE ADULT - SUBJECTIVE AND OBJECTIVE BOX
79 year old male known to the [facility] HF clinic (Followed by Dr. Shell), with PMH of CAD with STEMI 5/20222 with cardiogenic shock and IABP placement,  but was not revascularized due to non viable myocardium, ICM (TTE 6/6/24 LVEF 20%, RV systolic dysfunction, moderate MR/TR), LV thrombus, prior lung adenocarcinoma with RUL VATS in 11/2022 s/p radiation therapy comes in with complaints of SOB, LE and hypotension. Of note- patient has been seen at [facility] 5 x this year for SOB, BRODY and was placed on Dobutamine gtt home infusion. Per patient's wife he was unable to take his diuretics due to hypotension at home for which in hopes of raising BP he was given a salt load with bullions. He continued to worsen, and patient's wife called [facility] HF clinic and he was advised to come to ED for immediate care. In ED patient was found in decompensated HF/ cardiogenic shock, tachypneic tachycardic ( SVT) 130s , hypotensive with SBP 70-90s, and hypoxemic. Pt started on bipap, swan placed, pt started on ionotropes and admitted to CCU.    Overnight events: Denies CP or SOB. c/o lower back spasms, flexeril 5mg X1 given this morning. Bumex drip decreased to 1mg/hr 2/2 SBP in 70's-80's. Temp 93.6F rectally, Jaylyn hugger in use. REENA 7/5 CVP 20, CO 5.85, CI 2.82, .1, CVO2 57%, lactate 2.3  Tele event: SR with APC's, occasional pvc's       MEDICATIONS  (STANDING):  aspirin enteric coated 81 milliGRAM(s) Oral daily  buMETAnide Infusion 1 mG/Hr (5 mL/Hr) IV Continuous <Continuous>  chlorhexidine 2% Cloths 1 Application(s) Topical <User Schedule>  clopidogrel Tablet 75 milliGRAM(s) Oral daily  cyclobenzaprine 5 milliGRAM(s) Oral once  DOBUTamine Infusion 7.5 MICROgram(s)/kG/Min (19.2 mL/Hr) IV Continuous <Continuous>  FLUoxetine 20 milliGRAM(s) Oral <User Schedule>  heparin   Injectable 5000 Unit(s) SubCutaneous every 12 hours  hydrALAZINE 10 milliGRAM(s) Oral every 8 hours  latanoprost 0.005% Ophthalmic Solution 1 Drop(s) Both EYES at bedtime  lidocaine 4% Topical Solution 1 Application(s) Topical daily  LORazepam     Tablet 0.25 milliGRAM(s) Oral at bedtime  melatonin 3 milliGRAM(s) Oral at bedtime  mirtazapine 15 milliGRAM(s) Oral at bedtime  pantoprazole    Tablet 40 milliGRAM(s) Oral before breakfast  tamsulosin 0.8 milliGRAM(s) Oral at bedtime    MEDICATIONS  (PRN):  acetaminophen     Tablet .. 325 milliGRAM(s) Oral every 4 hours PRN Moderate Pain (4 - 6)  lidocaine   4% Patch 1 Patch Transdermal every 24 hours PRN pain  LORazepam     Tablet 0.25 milliGRAM(s) Oral at bedtime PRN Anxiety  sodium chloride 0.9% lock flush 10 milliLiter(s) IV Push every 1 hour PRN Pre/post blood products, medications, blood draw, and to maintain line patency        Vital Signs Last 24 Hrs  T(C): 34.2 (05 Jul 2024 08:00), Max: 36.6 (04 Jul 2024 20:00)  T(F): 93.6 (05 Jul 2024 08:00), Max: 97.8 (04 Jul 2024 20:00)  HR: 66 (05 Jul 2024 08:00) (64 - 81)  BP: 93/45 (/41-51)  BP(mean): 58 (55-67)  RR: 20 (05 Jul 2024 08:00) (11 - 23)  SpO2: 98% (05 Jul 2024 08:00) (95% - 100%)    Parameters below as of 05 Jul 2024 06:00  Patient On (Oxygen Delivery Method): nasal cannula  O2 Flow (L/min): 2    I&O's Detail    04 Jul 2024 07:01  -  05 Jul 2024 07:00  --------------------------------------------------------  IN:    Bumetanide: 160 mL    Bumetanide: 40 mL    DOBUTamine: 460.8 mL    IV PiggyBack: 100 mL    Nitroprusside: 10 mL    Oral Fluid: 680 mL  Total IN: 1450.8 mL    OUT:    Indwelling Catheter - Urethral (mL): 3855 mL  Total OUT: 3855 mL    Total NET: -2404.2 mL      05 Jul 2024 07:01  -  05 Jul 2024 08:40  --------------------------------------------------------  IN:    Bumetanide: 5 mL    DOBUTamine: 19.2 mL  Total IN: 24.2 mL    OUT:  Total OUT: 0 mL    Total NET: 24.2 mL          Physical Exam:   Constitutional: well appearing  Neck: supple, no JVD  Respiratory: Diminished breath sounds bilaterally,  no use of extra accessory muscles.  Cardiovascular: RRR, normal S1, S2, no gallops, +murmurs.   Gastrointestinal: soft, non-tender, non-distended, normal bowel sounds,  Extremities: no edema, no clubbing, no cyanosis  Vascular: palpable peripheral pulses, no cyanosis,  Neurological: Awake, follows commands  Skin: no skin lesions, no rash, no ulceration                            10.1   10.60 )-----------( 116      ( 05 Jul 2024 01:30 )             29.4       05 Jul 2024 01:30    127<L>  |  87<L>  |  105<H>  ----------------------------<  130<H>  3.2<L>   |  19<L>  |  3.95<H>  04 Jul 2024 19:45    130<L>  |  89<L>  |  107<H>  ----------------------------<  122<H>  3.0<L>   |  19<L>  |  3.81<H>    Ca    7.7<L>      05 Jul 2024 01:30  Ca    7.6<L>      04 Jul 2024 19:45  Phos  6.2<H>     05 Jul 2024 01:30  Phos  6.2<H>     04 Jul 2024 19:45  Mg     2.30      05 Jul 2024 01:30  Mg     2.40      04 Jul 2024 19:45    TPro  6.5    /  Alb  3.0<L>  /  TBili  3.3<H>  /  DBili  x      /  AST  150<H>  /  ALT  73<H>  /  AlkPhos  343<H>  05 Jul 2024 01:30  TPro  6.1    /  Alb  3.0<L>  /  TBili  3.2<H>  /  DBili  x      /  AST  158<H>  /  ALT  72<H>  /  AlkPhos  345<H>  04 Jul 2024 19:45          < from: TTE Echo Complete w/o Contrast w/ Doppler (06.06.24 @ 08:29) >   1. Biatrial enlargement   2. Left ventricular ejection fraction, by visual estimation, is <20%.   3. Severely decreased global left ventricular systolic function.   4. Entire septum, entire apex, and mid anterior segment are abnormal as   described above.   5. Increased LV wall thickness.   6. Mildly increased left ventricular internal cavity size.   7. Right ventricular volume and pressure overload.   8. There is mild concentric left ventricular hypertrophy.   9. Severely enlarged right ventricle.  10. Severely reduced RV systolic function.  11. Moderate mitral valve regurgitation.  12. Moderate-severe tricuspid regurgitation.  13. Mild aortic regurgitation.  14. Mild pulmonic valve regurgitation.  15. Estimated pulmonary artery systolic pressure is 44.6 mmHg assuming a   right atrial pressure of 15 mmHg, which is consistent with mild pulmonary   hypertension.  16. LA volume Index is 38.5 ml/m² ml/m2.            < from: CT Chest No Cont (06.24.24 @ 15:48) >  Since PET/CT 5/5/2024:    Unchanged left upper lobe nodule within the radiation field, however   slightly smaller compared to 4/1/2024. Recommend three-month follow-up to   assess for further change.Unchanged moderate right and new small left pleural effusions.    < end of copied text >            < from: Xray Chest 1 View-PORTABLE IMMEDIATE (Xray Chest 1 View-PORTABLE IMMEDIATE .) (07.01.24 @ 17:43) >  Right central venous catheter with tip in proximal SVC/brachiocephalic   vein area. Left upper extremity PICC with tip in the brachycephalic vein.  Trace bilateral pleural effusions with adjacent passive atelectasis.

## 2024-07-05 NOTE — DISCHARGE NOTE PROVIDER - HOSPITAL COURSE
79 year old male known to the [facility] HF clinic (Followed by Dr. Shell), with PMH of CAD with STEMI  with cardiogenic shock and IABP placement,  but was not revascularized due to non viable myocardium, ICM (TTE 24 LVEF 20%, RV systolic dysfunction, moderate MR/TR), LV thrombus, prior lung adenocarcinoma with RUL VATS in 2022 s/p radiation therapy comes in with complaints of SOB, LE and hypotension. Of note- patient has been seen at [facility] 5 x this year for SOB, BRODY and was placed on Dobutamine gtt home infusion. Per patient's wife he was unable to take his diuretics due to hypotension at home for which in hopes of raising BP he was given a salt load with bullions. He continued to worsen, and patient's wife called [facility] HF clinic and he was advised to come to ED for immediate care. In ED patient is tachypenic, tachycardic with BP 60-130s (SVT), hypotensive with SBP 70-90s, O2 sat 70s, and was placed on BIPAP. Home Dobutamine gtt was held in ED due to SVT,  Started on amiodarone drip and  restarted on Dobutamine drip . Patient admits to SOB at rest, worsening LE edema and weakness. Labs show H/H 11.5/33.5, BRODY BUN 97/3.09, Na 134, Cl 92, Alk phos 425, AST 75, ALT 36, pro BNP 91679, lactate 3.4. Patient was seen by heart failure team and received bumex 4mg IV x1 followed by Bumex drip. RIJ cordis was placed. Patient was admitted for acute on chronic heart failure with hypotension/ volume overload.   While in CCU,  increased to 7.5mcg/kg/min for decompensated HF, Nipride and  hydralazine started, additionally diamox, diuril, hypertonic 3 % given for diuresis.  79 year old male known to the [facility] HF clinic (Followed by Dr. Shell), with PMH of CAD with STEMI  with cardiogenic shock and IABP placement,  but was not revascularized due to non viable myocardium, ICM (TTE 24 LVEF 20%, RV systolic dysfunction, moderate MR/TR), LV thrombus, prior lung adenocarcinoma with RUL VATS in 2022 s/p radiation therapy comes in with complaints of SOB, LE and hypotension. Of note- patient has been seen at [facility] 5 x this year for SOB, BRODY and was placed on Dobutamine gtt home infusion. Per patient's wife he was unable to take his diuretics due to hypotension at home for which in hopes of raising BP he was given a salt load with bullions. He continued to worsen, and patient's wife called [facility] HF clinic and he was advised to come to ED for immediate care. In ED patient is tachypenic, tachycardic with BP 60-130s (SVT), hypotensive with SBP 70-90s, O2 sat 70s, and was placed on BIPAP. Home Dobutamine gtt was held in ED due to SVT,  Started on amiodarone drip and  restarted on Dobutamine drip . Patient admits to SOB at rest, worsening LE edema and weakness. Labs show H/H 11.5/33.5, BRODY BUN 97/3.09, Na 134, Cl 92, Alk phos 425, AST 75, ALT 36, pro BNP 20612, lactate 3.4. Patient was seen by heart failure team and received bumex 4mg IV x1 followed by Bumex drip. RIJ cordis was placed. Patient was admitted for acute on chronic heart failure with hypotension/ volume overload.   While in CCU,  increased to 7.5mcg/kg/min for decompensated HF, Nipride and  hydralazine started, additionally diamox, diuril, hypertonic 3 % given for diuresis.     On  patient and wife requested home care be reinstated so he can continue dobutamine gtt at 7.5mcg/kg/min. Bumex was transitioned to 4mg po bid. Metolazone was added today.  Hydralazine was discontinued. Patient had multiple complaints of itching and leg pain. He was treated with lidocaine patch.  79 year old male known to the [facility] HF clinic (Followed by Dr. Shell), with PMH of CAD with STEMI  with cardiogenic shock and IABP placement,  but was not revascularized due to non viable myocardium, ICM (TTE 24 LVEF 20%, RV systolic dysfunction, moderate MR/TR), LV thrombus, prior lung adenocarcinoma with RUL VATS in 2022 s/p radiation therapy comes in with complaints of SOB, LE and hypotension. Of note- patient has been seen at [facility] 5 x this year for SOB, BRODY and was placed on Dobutamine gtt home infusion. Per patient's wife he was unable to take his diuretics due to hypotension at home for which in hopes of raising BP he was given a salt load with bullions. He continued to worsen, and patient's wife called [facility] HF clinic and he was advised to come to ED for immediate care. In ED patient is tachypenic, tachycardic with BP 60-130s (SVT), hypotensive with SBP 70-90s, O2 sat 70s, and was placed on BIPAP. Home Dobutamine gtt was held in ED due to SVT,  Started on amiodarone drip and  restarted on Dobutamine drip . Patient admits to SOB at rest, worsening LE edema and weakness. Labs show H/H 11.5/33.5, BRODY BUN 97/3.09, Na 134, Cl 92, Alk phos 425, AST 75, ALT 36, pro BNP 82501, lactate 3.4. Patient was seen by heart failure team and received bumex 4mg IV x1 followed by Bumex drip. RIJ cordis was placed. Patient was admitted for acute on chronic heart failure with hypotension/ volume overload. While in CCU,  increased to 7.5mcg/kg/min for decompensated HF, Nipride and  hydralazine started, additionally diamox, diuril, hypertonic 3 % given for diuresis.   On  patient and wife requested home care be reinstated so he can continue dobutamine gtt at 7.5mcg/kg/min. Bumex  drip was transitioned to 4mg IV bid. Metolazone was added today.  Hydralazine was discontinued.  Patient and wife decline hospice care. Plan to discharge home on Dobutamin drip at 7.5mcg/kg/min, bumex 4mg PO bid , metolazone 2.5mg PO daily.  aware, Awaiting  medical bed  79 year old male known to the [facility] HF clinic (Followed by Dr. Shell), with PMH of CAD with STEMI  with cardiogenic shock and IABP placement,  but was not revascularized due to non viable myocardium, ICM (TTE 24 LVEF 20%, RV systolic dysfunction, moderate MR/TR), LV thrombus, prior lung adenocarcinoma with RUL VATS in 2022 s/p radiation therapy comes in with complaints of SOB, LE and hypotension. Of note- patient has been seen at [facility] 5 x this year for SOB, BRODY and was placed on Dobutamine gtt home infusion. Per patient's wife he was unable to take his diuretics due to hypotension at home for which in hopes of raising BP he was given a salt load with bullions. He continued to worsen, and patient's wife called [facility] HF clinic and he was advised to come to ED for immediate care. In ED patient is tachypenic, tachycardic with BP 60-130s (SVT), hypotensive with SBP 70-90s, O2 sat 70s, and was placed on BIPAP. Home Dobutamine gtt was held in ED due to SVT,  Started on amiodarone drip and  restarted on Dobutamine drip . Patient admits to SOB at rest, worsening LE edema and weakness. Labs show H/H 11.5/33.5, BRODY BUN 97/3.09, Na 134, Cl 92, Alk phos 425, AST 75, ALT 36, pro BNP 84959, lactate 3.4. Patient was seen by heart failure team and received bumex 4mg IV x1 followed by Bumex drip. RIJ cordis was placed. Patient was admitted for acute on chronic heart failure with hypotension/ volume overload. While in CCU,  increased to 7.5mcg/kg/min for decompensated HF, Nipride and  hydralazine started, additionally diamox, diuril, hypertonic 3 % given for diuresis.   On  patient and wife requested home care be reinstated so he can continue dobutamine gtt at 7.5mcg/kg/min. Bumex  drip was transitioned to 4mg IV bid. Metolazone was added today.  Hydralazine was discontinued.  Patient and wife decline hospice care. Plan to discharge home on Dobutamine drip at 7.5mcg/kg/min, bumex 4mg PO bid , metolazone 2.5mg PO daily.  [place], Awaiting  medical bed  79 year old male known to the [facility] HF clinic (Followed by Dr. Shell), with PMH of CAD with STEMI  with cardiogenic shock and IABP placement,  but was not revascularized due to non viable myocardium, ICM (TTE 24 LVEF 20%, RV systolic dysfunction, moderate MR/TR), LV thrombus, prior lung adenocarcinoma with RUL VATS in 2022 s/p radiation therapy comes in with complaints of SOB, LE and hypotension. Of note- patient has been seen at [facility] 5 x this year for SOB, BRODY and was placed on Dobutamine gtt home infusion. Per patient's wife he was unable to take his diuretics due to hypotension at home for which in hopes of raising BP he was given a salt load with bullions. He continued to worsen, and patient's wife called [facility] HF clinic and he was advised to come to ED for immediate care. In ED patient is tachypenic, tachycardic with BP 60-130s (SVT), hypotensive with SBP 70-90s, O2 sat 70s, and was placed on BIPAP. Home Dobutamine gtt was held in ED due to SVT,  Started on amiodarone drip and  restarted on Dobutamine drip . Patient admits to SOB at rest, worsening LE edema and weakness. Labs show H/H 11.5/33.5, BRODY BUN 97/3.09, Na 134, Cl 92, Alk phos 425, AST 75, ALT 36, pro BNP 36492, lactate 3.4. Patient was seen by heart failure team and received bumex 4mg IV x1 followed by Bumex drip. RIJ cordis was placed. Patient was admitted for acute on chronic heart failure with hypotension/ volume overload. While in CCU,  increased to 7.5mcg/kg/min for decompensated HF, Nipride and  hydralazine started, additionally diamox, diuril, hypertonic 3 % given for diuresis.   On  patient and wife requested home care be reinstated so he can continue dobutamine gtt at 7.5mcg/kg/min. Bumex  drip was transitioned to 4mg IV bid. Metolazone was added today.  Hydralazine was discontinued.  Patient and wife decline hospice care. Plan to discharge home on Dobutamine drip at 7.5mcg/kg/min, bumex 6mg PO bid , metolazone 2.5mg PRN.   has arranged for a medical bed.  79 year old male known to the [facility] HF clinic (Followed by Dr. Shell), with PMH of CAD with STEMI  with cardiogenic shock and IABP placement,  but was not revascularized due to non viable myocardium, ICM (TTE 24 LVEF 20%, RV systolic dysfunction, moderate MR/TR), LV thrombus, prior lung adenocarcinoma with RUL VATS in 2022 s/p radiation therapy comes in with complaints of SOB, LE and hypotension. Of note- patient has been seen at [facility] 5 x this year for SOB, BRODY and was placed on Dobutamine gtt home infusion. Per patient's wife he was unable to take his diuretics due to hypotension at home for which in hopes of raising BP he was given a salt load with bullions. He continued to worsen, and patient's wife called [facility] HF clinic and he was advised to come to ED for immediate care. In ED patient is tachypenic, tachycardic with BP 60-130s (SVT), hypotensive with SBP 70-90s, O2 sat 70s, and was placed on BIPAP. Home Dobutamine gtt was held in ED due to SVT,  Started on amiodarone drip and  restarted on Dobutamine drip . Patient admits to SOB at rest, worsening LE edema and weakness. Labs show H/H 11.5/33.5, BRODY BUN 97/3.09, Na 134, Cl 92, Alk phos 425, AST 75, ALT 36, pro BNP 51097, lactate 3.4. Patient was seen by heart failure team and received bumex 4mg IV x1 followed by Bumex drip. RIJ cordis was placed. Patient was admitted for acute on chronic heart failure with hypotension/ volume overload. While in CCU,  increased to 7.5mcg/kg/min for decompensated HF, Nipride and  hydralazine started, additionally diamox, diuril, hypertonic 3 % given for diuresis.   On  patient and wife requested home care be reinstated so he can continue dobutamine gtt at 7.5mcg/kg/min. Bumex  drip was transitioned to 4mg IV bid. Metolazone was added today.  Hydralazine was discontinued.  Patient and wife decline hospice care. Plan to discharge home on Dobutamine drip at 7.5mcg/kg/min, bumex 6mg PO bid , metolazone 2.5mg PRN.   has arranged for a medical bed.     Patient is ready for discharge

## 2024-07-05 NOTE — GOALS OF CARE CONVERSATION - ADVANCED CARE PLANNING - CONVERSATION/DISCUSSION
Diagnosis/Prognosis/Treatment Options/Hospice Referral
Diagnosis/Prognosis/MOLST Discussed/Treatment Options/Hospice Referral
BMI: 32.1. IBW: 125#+/-10%. Pt with significant wt loss within ~2-3 months. 8/28 213#, #. RD observes moderate temporal wasting. Skin WDL, BS 18. Pt meets criteria for PCM- see bottom of note for recs./obese

## 2024-07-05 NOTE — DISCHARGE NOTE PROVIDER - CARE PROVIDERS DIRECT ADDRESSES
[email or web address]

## 2024-07-05 NOTE — DISCHARGE NOTE PROVIDER - DETAILS OF MALNUTRITION DIAGNOSIS/DIAGNOSES
This patient has been assessed with a concern for Malnutrition and was treated during this hospitalization for the following Nutrition diagnosis/diagnoses:     -  07/07/2024: Severe protein-calorie malnutrition

## 2024-07-05 NOTE — PHYSICAL THERAPY INITIAL EVALUATION ADULT - GENERAL OBSERVATIONS, REHAB EVAL
Consult received, chart reviewed. Patient received in bed, no apparent distress, +cardiac monitor, +NC, +gerardo, wife present. Pt. agreeable to participate in PT.

## 2024-07-05 NOTE — GOALS OF CARE CONVERSATION - ADVANCED CARE PLANNING - CONVERSATION DETAILS
Wife verbalized understanding of poor prognosis from his HF and tearfully shares patient has been restless due to being uncomfortable and having anxiety. She shares she does not want patient to suffer given his prognosis and wants for him to comfortably pass away at home.     Reviewed differentiation between palliative care vs hospice philosophy of care/ services. Wife shares struggle with accepting hospice as she states patient is a "fighter" and "does not want to give up" but at the same time she understands his clinical status is guarded with poor prognosis and had family members visit yesterday due to his prognosis.     Patient shared that he does not wish to return to the hospital further in the future. Reviewed recommendation for hospice philosophy of care/services. Reviewed that dobutamine gtt would be capped and cannot be escalated when on home hospice. Also reviewed that dobutamine would not be continued indefinitely and likely would not be continued by hospice services after completion of dobutamine bag; wife expressed concerns about this as she is fearful that this would hasten his time; reviewed that dobutamine is not a disease modifying treatment for his heart failure and as such unlikely to improve his overall prognosis. Reviewed that symptom management would be continued after completion of dobutamine.     Counseled further on hospice philosophy of care/ services.   Wife amenable to hospice referral for information gathering.
Patient and his wife have been  for 40+ years; they have no children. Wife shares they live in the [facility] and are well supported by their friends in the community.     Kenia acknowledges that patient has had recurrent hospitalizations due to his heart failure. She shares that patient seems weaker after each hospitalization. She shares that while they understand that his clinical status and prognosis is guarded, they wish to continue with medical optimization. They defer hospice referral at this time, but are interested in palliative care followup outpatient; provided information about outpatient [facility] for outpatient palliative follow up.    Patient and wife confirm wishes for advanced directives are DNR/DNI.    Emotional support provided  Caregiver Support referral offered; wife declined referral

## 2024-07-05 NOTE — PROGRESS NOTE ADULT - PROBLEM SELECTOR PLAN 1
Dobutamine 7.5mcg/kg/min   Bumex 1mg/hr goal net negative; give diuril and 3% saline  Keep K 4.0-5.0. and Mag >2.0.   Strict I/O.   Patient expressed his wishes to be DNR/DNI- wife present by bedside and agreed.  Explained overall poor prognosis. Patient  and wife stated they understand.  Appreciate Palliative Care consult; wife requesting home hospice   Management per CCU team  Pending final recommendations from HF attending Dobutamine 7.5mcg/kg/min   Bumex 1mg/hr goal net negative  Keep K 4.0-5.0. and Mag >2.0   Strict I/O.   Patient expressed his wishes to be DNR/DNI- wife present by bedside and agreed.  Appreciate Palliative Care consult; wife requesting home hospice   Management per CCU team  Pending final recommendations from HF attending

## 2024-07-05 NOTE — DISCHARGE NOTE PROVIDER - NSDCHHHOMEBOUND_GEN_ALL_CORE
Requires supervison due to deteriorating mental status.../Bed bound/Shortness of breath with minimal ambulation/Chest pain/weakness during/after ambulation   greater than 20 feet/Fall risk/Other, specify.../Pain greater than 7 on scale of 10 on ambulation

## 2024-07-05 NOTE — PHYSICAL THERAPY INITIAL EVALUATION ADULT - PATIENT PROFILE REVIEW, REHAB EVAL
Activity - OOB with assistance. Spoke with Catracho AGUIAR NP, pt. ok to be seen for PT Evaluation/OOB to chair./yes

## 2024-07-05 NOTE — PROGRESS NOTE ADULT - PROBLEM SELECTOR PLAN 4
Wife verbalized understanding of poor prognosis from his HF. Patient shared that he does not wish to return to the hospital further in the future. Wife shares she does not want patient to suffer, and wishes for him to pass away at home. Reviewed recommendation for hospice philosophy of care/services. Wife amenable to hospice referral for information gathering.   See GOC note

## 2024-07-05 NOTE — PHYSICAL THERAPY INITIAL EVALUATION ADULT - GAIT DEVIATIONS NOTED, PT EVAL
decreased humberto/increased time in double stance/decreased step length/decreased stride length/decreased weight-shifting ability

## 2024-07-05 NOTE — PROGRESS NOTE ADULT - PROBLEM SELECTOR PLAN 2
- Patient with restlessness due to pain in his back and legs; he states pain in leg is cramping in nature and back pain is worse with certain positions. He states flexeril is helpful for his cramping.   - Agree with Lidocaine Patch   - On Flexeril as per primary team   - PO Tylenol 650mg q6 PRN mild-moderate pain   - Can start PO Oxycodone IR 2.5mg q6 PRN severe pain (hold for hypotension, oversedation, respiratory depression) ; bowel regimen while on opiates

## 2024-07-05 NOTE — DISCHARGE NOTE PROVIDER - NSDCACTIVITY_GEN_ALL_CORE
Bathing allowed/Stairs allowed/Walking - Indoors allowed/Walking - Outdoors allowed/Follow Instructions Provided by your Surgical Team/Activity as tolerated

## 2024-07-05 NOTE — PROGRESS NOTE ADULT - ASSESSMENT
This is a 79 year old male known to the [facility] HF clinic (Followed by Dr. Shell), with PMH of CAD with STEMI  with cardiogenic shock and IABP placement (medically managed d/t non-viable myocardium), ICM/HFrEF (EF 20%, RV systolic dysfunction, moderate MR/TR; on  since ), LV thrombus, prior lung adenocarcinoma with RUL VATS in 2022 s/p radiation therapy presented on  with complaints of SOB, LE and hypotension. In ED patient is tachypenic, tachycardic with BP 60-130s (SVT), hypotensive with SBP 70-90s, O2 sat 70s, and was placed on BIPAP. Home Dobutamine gtt was held in ED due to SVT, but was restarted s/p HF consultation. Overall stage D HF, NYHA class IV- is in cardiogenic shock with massive volume overload. Improving with /bumex gtt but remains critically ill.

## 2024-07-05 NOTE — PROGRESS NOTE ADULT - PROBLEM SELECTOR PLAN 1
- TTE 6/6 -  1. Biatrial enlargement 2. Left ventricular ejection fraction, by visual estimation, is <20%.  3. Severely decreased global left ventricular systolic function.  4. Entire septum, entire apex, and mid anterior segment are abnormal as described above.  5. Increased LV wall thickness.  6. Mildly increased left ventricular internal cavity size.   7. Right ventricular volume and pressure overload. 8. There is mild concentric left ventricular hypertrophy.  9. Severely enlarged right ventricle.10. Severely reduced RV systolic function.11. Moderate mitral valve regurgitation.  12. Moderate-severe tricuspid regurgitation.13. Mild aortic regurgitation.14. Mild pulmonic valve regurgitation.  15. Estimated pulmonary artery systolic pressure is 44.6 mmHg assuming a right atrial pressure of 15 mmHg, which is consistent with mild pulmonary hypertension.16. LA volume Index is 38.5 ml/m² ml/m2.  - CCU and Heart Failure recommendations appreciated  - Per discussion with CCU team, patient in cardiogenic shock, currently on dobutamine gtt and bumex gtt  - management as per primary team.

## 2024-07-05 NOTE — PROGRESS NOTE ADULT - ASSESSMENT
79 year old male known to the [facility] HF clinic (Followed by Dr. Shell), with PMH of CAD with STEMI 5/20222 with cardiogenic shock and IABP placement,  but was not revascularized due to non viable myocardium, ICM (TTE 6/6/24 LVEF 20%, RV systolic dysfunction, moderate MR/TR), LV thrombus, prior lung adenocarcinoma with RUL VATS in 11/2022 s/p radiation therapy comes in with complaints of SOB, LE and hypotension. Of note- patient has been seen at [facility] 5 x this year for SOB, BRODY and was placed on Dobutamine gtt home infusion. Per patient's wife he was unable to take his diuretics due to hypotension at home for which in hopes of raising BP he was given a salt load with bullions. He continued to worsen, and patient's wife called [facility] HF clinic. Patient was found to be in cardiogenic shock with massive volume overload.

## 2024-07-05 NOTE — PHYSICAL THERAPY INITIAL EVALUATION ADULT - ADDITIONAL COMMENTS
Pt. reports having to use rolling walker more frequently. Pt. owns a transport wheelchair.     Pt. was left seated in recliner chair post PT Evaluation, no apparent distress, all lines/devices intact, HR 71 bpm, call bell within reach. RN made aware of pt. status and participation in PT.

## 2024-07-05 NOTE — PROGRESS NOTE ADULT - SUBJECTIVE AND OBJECTIVE BOX
Interval History:  Patient resting comfortably in bed   Denies CP/SOB/palpitations/dizziness.  No acute events overnight.      Medications:  acetaminophen     Tablet .. 325 milliGRAM(s) Oral every 4 hours PRN  aspirin enteric coated 81 milliGRAM(s) Oral daily  buMETAnide Infusion 1 mG/Hr IV Continuous <Continuous>  chlorhexidine 2% Cloths 1 Application(s) Topical <User Schedule>  clopidogrel Tablet 75 milliGRAM(s) Oral daily  DOBUTamine Infusion 7.5 MICROgram(s)/kG/Min IV Continuous <Continuous>  FLUoxetine 20 milliGRAM(s) Oral <User Schedule>  heparin   Injectable 5000 Unit(s) SubCutaneous every 12 hours  hydrALAZINE 10 milliGRAM(s) Oral every 8 hours  latanoprost 0.005% Ophthalmic Solution 1 Drop(s) Both EYES at bedtime  lidocaine   4% Patch 1 Patch Transdermal every 24 hours PRN  lidocaine 4% Topical Solution 1 Application(s) Topical daily  LORazepam     Tablet 0.25 milliGRAM(s) Oral at bedtime PRN  LORazepam     Tablet 0.25 milliGRAM(s) Oral at bedtime  melatonin 3 milliGRAM(s) Oral at bedtime  mirtazapine 15 milliGRAM(s) Oral at bedtime  pantoprazole    Tablet 40 milliGRAM(s) Oral before breakfast  rosuvastatin 20 milliGRAM(s) Oral at bedtime  sodium chloride 0.9% lock flush 10 milliLiter(s) IV Push every 1 hour PRN  tamsulosin 0.8 milliGRAM(s) Oral at bedtime      Vitals:  T(C): 34.2 (24 @ 08:00), Max: 36.6 (24 @ 20:00)  HR: 66 (24 @ 10:00) (64 - 81)  BP: --  BP(mean): --  RR: 23 (24 @ 10:00) (14 - 23)  SpO2: 98% (24 @ 10:00) (95% - 100%)    Daily     Daily Weight in k.2 (2024 04:00)        I&O's Summary    2024 07:01  -  2024 07:00  --------------------------------------------------------  IN: 1450.8 mL / OUT: 3855 mL / NET: -2404.2 mL    2024 07:01  -  2024 10:27  --------------------------------------------------------  IN: 288.4 mL / OUT: 300 mL / NET: -11.6 mL        Physical Exam:  Appearance: No Acute Distress  Neck: JVP  Cardiovascular: Normal S1 S2  Respiratory: Clear to auscultation bilaterally  Gastrointestinal: Soft, Non-tender	  Skin: No cyanosis	  Neurologic: Non-focal  Extremities: No LE edema      Labs:                        10.1   10.60 )-----------( 116      ( 2024 01:30 )             29.4         127<L>  |  87<L>  |  105<H>  ----------------------------<  130<H>  3.2<L>   |  19<L>  |  3.95<H>    Ca    7.7<L>      2024 01:30  Phos  6.2       Mg     2.30         TPro  6.5  /  Alb  3.0<L>  /  TBili  3.3<H>  /  DBili  x   /  AST  150<H>  /  ALT  73<H>  /  AlkPhos  343<H>              TELEMETRY:    Echocardiogram:  TTE Echo Complete w/o Contrast w/ Doppler (24 @ 08:29) >    LV Wall Scoring:  The RCA distribution, entire septum, and entire apex are akinetic. The mid  anterior segment is hypokinetic. All remaining scored segments are normal.    Right Ventricle: The right ventricular size is severely enlarged. RV   systolic function is severely reduced. TV S' 0.1 m/s.  Left Atrium: Mildly enlarged left atrium. LA volume Index is 38.5 ml/m²   ml/m2.  Right Atrium: Severely enlarged right atrium.  Pericardium: Trivial pericardial effusion is present. The pericardial   effusion is localized near the right ventricle.  Mitral Valve: Structurally normal mitral valve, with normal leaflet   excursion. Moderate mitral valve regurgitation is seen. The MR jet is   centrally-directed. Diastolic mitral regurgitation is present.  Tricuspid Valve: Structurally normal tricuspid valve, with normal leaflet   excursion. Moderate-severe tricuspid regurgitation is visualized.   Estimated pulmonary artery systolic pressure is 44.6 mmHg assuming a   right atrialpressure of 15 mmHg, which is consistent with mild pulmonary   hypertension.  Aortic Valve: Normal trileaflet aortic valve with normal opening. Mild   aortic valve regurgitation is seen.  Pulmonic Valve: Structurally normal pulmonic valve, with normal leaflet   excursion. Mild pulmonic valve regurgitation.  Aorta: The aortic root and ascending aorta are structurally normal, with   no evidence of dilitation. The aortic arch was not well visualized.  Pulmonary Artery: The main pulmonary artery is normal in size.  Venous: The inferior vena cava is abnormal. The inferior vena cava was   dilated, with respiratory size variation less tan 50%.      Summary:   1. Biatrial enlargement   2. Left ventricular ejection fraction, by visual estimation, is <20%.   3. Severely decreased global left ventricular systolic function.   4. Entire septum, entire apex, and mid anterior segment are abnormal as   described above.   5. Increased LV wall thickness.   6. Mildly increased left ventricular internal cavity size.   7. Right ventricular volume and pressure overload.   8. There is mild concentric left ventricular hypertrophy.   9. Severely enlarged right ventricle.  10. Severely reduced RV systolic function.  11. Moderate mitral valve regurgitation.  12. Moderate-severe tricuspid regurgitation.  13. Mild aortic regurgitation.  14. Mild pulmonic valve regurgitation.  15. Estimated pulmonary artery systolic pressure is 44.6 mmHg assuming a   right atrial pressure of 15 mmHg, which is consistent with mild pulmonary   hypertension.  16. LA volume Index is 38.5 ml/m² ml/m2.       Interval History:  Patient resting comfortably in bed   Denies CP/SOB/palpitations/dizziness.  No acute events overnight.      Medications:  acetaminophen     Tablet .. 325 milliGRAM(s) Oral every 4 hours PRN  aspirin enteric coated 81 milliGRAM(s) Oral daily  buMETAnide Infusion 1 mG/Hr IV Continuous <Continuous>  chlorhexidine 2% Cloths 1 Application(s) Topical <User Schedule>  clopidogrel Tablet 75 milliGRAM(s) Oral daily  DOBUTamine Infusion 7.5 MICROgram(s)/kG/Min IV Continuous <Continuous>  FLUoxetine 20 milliGRAM(s) Oral <User Schedule>  heparin   Injectable 5000 Unit(s) SubCutaneous every 12 hours  hydrALAZINE 10 milliGRAM(s) Oral every 8 hours  latanoprost 0.005% Ophthalmic Solution 1 Drop(s) Both EYES at bedtime  lidocaine   4% Patch 1 Patch Transdermal every 24 hours PRN  lidocaine 4% Topical Solution 1 Application(s) Topical daily  LORazepam     Tablet 0.25 milliGRAM(s) Oral at bedtime PRN  LORazepam     Tablet 0.25 milliGRAM(s) Oral at bedtime  melatonin 3 milliGRAM(s) Oral at bedtime  mirtazapine 15 milliGRAM(s) Oral at bedtime  pantoprazole    Tablet 40 milliGRAM(s) Oral before breakfast  rosuvastatin 20 milliGRAM(s) Oral at bedtime  sodium chloride 0.9% lock flush 10 milliLiter(s) IV Push every 1 hour PRN  tamsulosin 0.8 milliGRAM(s) Oral at bedtime      Vitals:  T(C): 34.2 (24 @ 08:00), Max: 36.6 (24 @ 20:00)  HR: 66 (24 @ 10:00) (64 - 81)  BP: --  BP(mean): --  RR: 23 (24 @ 10:00) (14 - 23)  SpO2: 98% (24 @ 10:00) (95% - 100%)    Daily     Daily Weight in k.2 (2024 04:00)        I&O's Summary    2024 07:01  -  2024 07:00  --------------------------------------------------------  IN: 1450.8 mL / OUT: 3855 mL / NET: -2404.2 mL    2024 07:01  -  2024 10:27  --------------------------------------------------------  IN: 288.4 mL / OUT: 300 mL / NET: -11.6 mL        Physical Exam:  Appearance: No Acute Distress  Neck: JVP~10  Cardiovascular: Normal S1 S2  Respiratory: Clear to auscultation bilaterally  Gastrointestinal: Soft, Non-tender	  Skin: No cyanosis	  Neurologic: Non-focal  Extremities: No LE edema      Labs:                        10.1   10.60 )-----------( 116      ( 2024 01:30 )             29.4     07-    127<L>  |  87<L>  |  105<H>  ----------------------------<  130<H>  3.2<L>   |  19<L>  |  3.95<H>    Ca    7.7<L>      2024 01:30  Phos  6.2     07-  Mg     2.30         TPro  6.5  /  Alb  3.0<L>  /  TBili  3.3<H>  /  DBili  x   /  AST  150<H>  /  ALT  73<H>  /  AlkPhos  343<H>          TELEMETRY: Sinus Rhythm    Echocardiogram:  TTE Echo Complete w/o Contrast w/ Doppler (24 @ 08:29)     LV Wall Scoring:  The RCA distribution, entire septum, and entire apex are akinetic. The mid  anterior segment is hypokinetic. All remaining scored segments are normal.    Right Ventricle: The right ventricular size is severely enlarged. RV   systolic function is severely reduced. TV S' 0.1 m/s.  Left Atrium: Mildly enlarged left atrium. LA volume Index is 38.5 ml/m²   ml/m2.  Right Atrium: Severely enlarged right atrium.  Pericardium: Trivial pericardial effusion is present. The pericardial   effusion is localized near the right ventricle.  Mitral Valve: Structurally normal mitral valve, with normal leaflet   excursion. Moderate mitral valve regurgitation is seen. The MR jet is   centrally-directed. Diastolic mitral regurgitation is present.  Tricuspid Valve: Structurally normal tricuspid valve, with normal leaflet   excursion. Moderate-severe tricuspid regurgitation is visualized.   Estimated pulmonary artery systolic pressure is 44.6 mmHg assuming a   right atrialpressure of 15 mmHg, which is consistent with mild pulmonary   hypertension.  Aortic Valve: Normal trileaflet aortic valve with normal opening. Mild   aortic valve regurgitation is seen.  Pulmonic Valve: Structurally normal pulmonic valve, with normal leaflet   excursion. Mild pulmonic valve regurgitation.  Aorta: The aortic root and ascending aorta are structurally normal, with   no evidence of dilitation. The aortic arch was not well visualized.  Pulmonary Artery: The main pulmonary artery is normal in size.  Venous: The inferior vena cava is abnormal. The inferior vena cava was   dilated, with respiratory size variation less tan 50%.      Summary:   1. Biatrial enlargement   2. Left ventricular ejection fraction, by visual estimation, is <20%.   3. Severely decreased global left ventricular systolic function.   4. Entire septum, entire apex, and mid anterior segment are abnormal as   described above.   5. Increased LV wall thickness.   6. Mildly increased left ventricular internal cavity size.   7. Right ventricular volume and pressure overload.   8. There is mild concentric left ventricular hypertrophy.   9. Severely enlarged right ventricle.  10. Severely reduced RV systolic function.  11. Moderate mitral valve regurgitation.  12. Moderate-severe tricuspid regurgitation.  13. Mild aortic regurgitation.  14. Mild pulmonic valve regurgitation.  15. Estimated pulmonary artery systolic pressure is 44.6 mmHg assuming a   right atrial pressure of 15 mmHg, which is consistent with mild pulmonary   hypertension.  16. LA volume Index is 38.5 ml/m² ml/m2.

## 2024-07-05 NOTE — DISCHARGE NOTE PROVIDER - NSDCMRMEDTOKEN_GEN_ALL_CORE_FT
aspirin 81 mg oral delayed release tablet: 1 tab(s) orally once a day  bumetanide 1 mg oral tablet: 2 tab(s) orally 2 times a day  clopidogrel 75 mg oral tablet: 1 tab(s) orally once a day  DOBUTamine: 4.2 milliliter(s) by continuous intravenous infusion once a day 1000mg in 250cc, infuse at 4.2ml/hr continuously daily, no stop time  FLUoxetine (Eqv-PROzac) 20 mg oral tablet: 1 tab(s) orally every 3 days  latanoprost 0.005% ophthalmic solution: 1 drop(s) in each eye once a day  metOLazone 2.5 mg oral tablet: 1 tab(s) orally every other day  mirtazapine 15 mg oral tablet: 1 tab(s) orally once a day (at bedtime)  rosuvastatin 20 mg oral tablet: 1 tab(s) orally once a day (at bedtime)  tamsulosin 0.4 mg oral capsule: 2 cap(s) orally once a day (at bedtime)  temazepam 15 mg oral capsule: 1 cap(s) orally once a day (at bedtime) as needed for  insomnia   acetaminophen 325 mg oral tablet: 1 tab(s) orally every 4 hours As needed Moderate Pain (4 - 6)  aspirin 81 mg oral delayed release tablet: 1 tab(s) orally once a day  bumetanide 1 mg oral tablet: 2 tab(s) orally 2 times a day  clopidogrel 75 mg oral tablet: 1 tab(s) orally once a day  DOBUTamine: 4.2 milliliter(s) by continuous intravenous infusion once a day 1000mg in 250cc, infuse at 4.2ml/hr continuously daily, no stop time  FLUoxetine (Eqv-PROzac) 20 mg oral tablet: 1 tab(s) orally every 3 days  latanoprost 0.005% ophthalmic solution: 1 drop(s) in each eye once a day  metOLazone 2.5 mg oral tablet: 1 tab(s) orally every other day  mirtazapine 15 mg oral tablet: 1 tab(s) orally once a day (at bedtime)  pantoprazole 40 mg oral delayed release tablet: 1 tab(s) orally once a day (before a meal)  rosuvastatin 20 mg oral tablet: 1 tab(s) orally once a day (at bedtime)  spironolactone 25 mg oral tablet: 1 tab(s) orally once a day  tamsulosin 0.4 mg oral capsule: 2 cap(s) orally once a day (at bedtime)  temazepam 15 mg oral capsule: 1 cap(s) orally once a day (at bedtime) as needed for  insomnia   acetaminophen 325 mg oral tablet: 1 tab(s) orally every 4 hours As needed Moderate Pain (4 - 6)  aspirin 81 mg oral delayed release tablet: 1 tab(s) orally once a day  bumetanide 1 mg oral tablet: 2 tab(s) orally 2 times a day  clopidogrel 75 mg oral tablet: 1 tab(s) orally once a day  DOBUTamine: 4.2 milliliter(s) by continuous intravenous infusion once a day 1000mg in 250cc, infuse at 4.2ml/hr continuously daily, no stop time  DOBUTamine:   FLUoxetine (Eqv-PROzac) 20 mg oral tablet: 1 tab(s) orally every 3 days  latanoprost 0.005% ophthalmic solution: 1 drop(s) in each eye once a day  metOLazone 2.5 mg oral tablet: 1 tab(s) orally every other day  mirtazapine 15 mg oral tablet: 1 tab(s) orally once a day (at bedtime)  pantoprazole 40 mg oral delayed release tablet: 1 tab(s) orally once a day (before a meal)  rosuvastatin 20 mg oral tablet: 1 tab(s) orally once a day (at bedtime)  spironolactone 25 mg oral tablet: 1 tab(s) orally once a day  tamsulosin 0.4 mg oral capsule: 2 cap(s) orally once a day (at bedtime)  temazepam 15 mg oral capsule: 1 cap(s) orally once a day (at bedtime) as needed for  insomnia   acetaminophen 325 mg oral tablet: 1 tab(s) orally every 4 hours as needed for Moderate Pain (4 - 6)  aspirin 81 mg oral delayed release tablet: 1 tab(s) orally once a day  bumetanide 2 mg oral tablet: 3 tab(s) orally 2 times a day  clopidogrel 75 mg oral tablet: 1 tab(s) orally once a day  DOBUTamine: 8.5 milliliter(s) by continuous intravenous infusion once a day 1000mg in 250cc, infuse at 8.5ml/hr continuously daily, no stop time  latanoprost 0.005% ophthalmic solution: 1 drop(s) in each eye once a day  mirtazapine 15 mg oral tablet: 1 tab(s) orally once a day (at bedtime)  pantoprazole 40 mg oral delayed release tablet: 1 tab(s) orally once a day (before a meal)  polyethylene glycol 3350 oral powder for reconstitution: 17 gram(s) orally 2 times a day  potassium chloride 20 mEq oral tablet, extended release: 1 tab(s) orally 2 times a day  rosuvastatin 20 mg oral tablet: 1 tab(s) orally once a day (at bedtime)  senna leaf extract oral tablet: 2 tab(s) orally once a day (at bedtime)  spironolactone 25 mg oral tablet: 1 tab(s) orally once a day  spironolactone 25 mg oral tablet: 1 tab(s) orally once a day  tamsulosin 0.4 mg oral capsule: 2 cap(s) orally once a day (at bedtime)  temazepam 15 mg oral capsule: 1 cap(s) orally once a day (at bedtime) as needed for  insomnia   acetaminophen 325 mg oral tablet: 1 tab(s) orally every 4 hours as needed for Moderate Pain (4 - 6)  aspirin 81 mg oral delayed release tablet: 1 tab(s) orally once a day  bumetanide 2 mg oral tablet: 3 tab(s) orally 2 times a day  clopidogrel 75 mg oral tablet: 1 tab(s) orally once a day  DOBUTamine: 8.5 milliliter(s) by continuous intravenous infusion once a day 1000mg in 250cc, infuse at 8.5ml/hr continuously daily, no stop time  latanoprost 0.005% ophthalmic solution: 1 drop(s) in each eye once a day  mirtazapine 15 mg oral tablet: 1 tab(s) orally once a day (at bedtime)  pantoprazole 40 mg oral delayed release tablet: 1 tab(s) orally once a day (before a meal)  polyethylene glycol 3350 oral powder for reconstitution: 17 gram(s) orally 2 times a day  potassium chloride 20 mEq oral tablet, extended release: 1 tab(s) orally 2 times a day  rosuvastatin 20 mg oral tablet: 1 tab(s) orally once a day (at bedtime)  senna leaf extract oral tablet: 2 tab(s) orally once a day (at bedtime)  spironolactone 25 mg oral tablet: 1 tab(s) orally once a day  tamsulosin 0.4 mg oral capsule: 2 cap(s) orally once a day (at bedtime)  temazepam 15 mg oral capsule: 1 cap(s) orally once a day (at bedtime) as needed for  insomnia   acetaminophen 325 mg oral tablet: 1 tab(s) orally every 4 hours as needed for Moderate Pain (4 - 6)  amoxicillin-clavulanate 500 mg-125 mg oral tablet: 500 milligram(s) orally once a day  aspirin 81 mg oral delayed release tablet: 1 tab(s) orally once a day  bumetanide 2 mg oral tablet: 3 tab(s) orally 2 times a day  clopidogrel 75 mg oral tablet: 1 tab(s) orally once a day  DOBUTamine: 8.5 milliliter(s) by continuous intravenous infusion once a day 1000mg in 250cc, infuse at 8.5ml/hr continuously daily, no stop time  latanoprost 0.005% ophthalmic solution: 1 drop(s) in each eye once a day  mirtazapine 15 mg oral tablet: 1 tab(s) orally once a day (at bedtime)  pantoprazole 40 mg oral delayed release tablet: 1 tab(s) orally once a day (before a meal)  polyethylene glycol 3350 oral powder for reconstitution: 17 gram(s) orally 2 times a day  potassium chloride 20 mEq oral tablet, extended release: 1 tab(s) orally 2 times a day  rosuvastatin 20 mg oral tablet: 1 tab(s) orally once a day (at bedtime)  senna leaf extract oral tablet: 2 tab(s) orally once a day (at bedtime)  spironolactone 25 mg oral tablet: 1 tab(s) orally once a day  tamsulosin 0.4 mg oral capsule: 2 cap(s) orally once a day (at bedtime)  temazepam 15 mg oral capsule: 1 cap(s) orally once a day (at bedtime) as needed for  insomnia

## 2024-07-05 NOTE — PHYSICAL THERAPY INITIAL EVALUATION ADULT - PERTINENT HX OF CURRENT PROBLEM, REHAB EVAL
Per documentation, pt. admitted for SOB, LE edema and hypotension. Pt started on bipap, swan placed, pt. started on ionotropes and admitted to CCU.

## 2024-07-05 NOTE — PROGRESS NOTE ADULT - ASSESSMENT
79 year old male known to the [facility] HF clinic (Followed by Dr. Shell), with PMH of CAD with STEMI  with cardiogenic shock and IABP placement,  but was not revascularized due to non viable myocardium, ICM (TTE 24 LVEF 20%, RV systolic dysfunction, moderate MR/TR), LV thrombus, prior lung adenocarcinoma with RUL VATS in 2022 s/p radiation therapy comes in with complaints of SOB, LE edema and hypotension. Pt started on bipap, swan placed, pt started on ionotropes and admitted to CCU    Neuro  # bipolar disorder/depression/anxiety   - AxOX3  - intermittent lethargy likely from shock state  - cont home psych meds : FLUoxetine, mirtazapine   - started on ativan Po as needed for anxiety    Resp  #Hypoxic Resp Failure  - likely from pulm edema i/s of cardiogenic shock  -weaned from NIV to 4LNC   - now breathing comfortably. titrate for sat>92%  - ABG today: pH, Arterial: 7.45  pH, Blood: x     /  pCO2: 26    /  pO2: 133   / HCO3: 18    / Base Excess: -4.9  /  SaO2: 99.0        # history of lung cancer s/p RUL lobectomy (2022) with recurrent disease s/p radiation to left lung (completed 3/2023),   -Stage IV lung carcinoma, s/p local RT, never on systemic chemotherapy        Cardiac  #  chronic systolic congestive heart failure with cardiogenic shock   - TTE  EF 20% with RV systolic dysfx and moderate MR  -  AM numbers: CVP 26, CO3.1, CI 1.5, SVR 1282, SVO2 42% this AM  - lactate rise to 9 on   -  increased to 7.5 and bumex drip increased to 2mg/hr and given diuril for oliguria on   -  decrease  back to 5/hr and start Nitroprusside for afterload reduction on   - dobutamine increase back to 7.5mg for elevated lactate  -  started on hydralazine  - lactate 1.9 -->2.3  - VOL and Low flow status on exam  - Reena from Central venous sat 7/3  :CVP 25 CO 4 CI 1.9  CVO2 52.4 Lact 2.5 on  5/Bumex 2,   - Diuril 500mg IV x2, hypertonic 3%   x1, Diamox IV 500mg x1 on 7/3  - Nipride drip d/c'd   - Reena ; CVP 25, CO 4, CI 2,   - REENA  CVP 20, CO 5.85, CI 2.82, .1, CVO2 57%, lactate 2.3 on  7.5mcg/kg/min and bumex 2mg/hr.  - strict I/o: IN: 1450 mL / OUT: 3855 mL / NET: -2404mL        # coronary artery disease   - LHC on 2022 showed RCA  and significant LAD and diagonal lesions-but was not revascularized due to non viable myocardium  - c/w Aspirin an Plavix and Lipitor    #SVT  - SVT at 130 in ED  - likely in setting of cardiogenic shock  -pt recieved amio and on Amio drip now. Now that pt's BP has normalized, no further episodes of SVT.  Amio d/c on      GI  #Transaminitis   - iso of VOL  - down trending  - abdomen firm  - DASH diet now pt comfortable off NIV  -cont protonix for GI ppx  -        # BRODY on CKD iso cardiorenal  - Baseline creatinine: 2  -worsening crn likely from cardiogenic shock   - inc bumex drip and stat dose of diuril for oilguria and volume overload on   - worsening S creat, today  3  - monitor UO   -avoid nephrotoxins  - pt does not want HD if needed      Heme:  -cbc stable   -HSQ for DVT ppx    Endo:   -A1c 6.2  -TSH on  wnl   -maintain gluc<180 on bmp    LIne:   RIJ cordis on    Left arm double lumen PIcc line with no blood return->  placed on  at [facility]    Left upper extremity PICC with tip in the brachiocephalic vein on Cxr        #Ethics:  -overnight, wife expressed wishing for home hospice care option.          79 year old male known to the [facility] HF clinic (Followed by Dr. Shell), with PMH of CAD with STEMI  with cardiogenic shock and IABP placement,  but was not revascularized due to non viable myocardium, ICM (TTE 24 LVEF 20%, RV systolic dysfunction, moderate MR/TR), LV thrombus, prior lung adenocarcinoma with RUL VATS in 2022 s/p radiation therapy comes in with complaints of SOB, LE edema and hypotension. Pt started on bipap, swan placed, pt started on ionotropes and admitted to CCU    Neuro  # bipolar disorder/depression/anxiety   - AxOX3  - intermittent lethargy likely from shock state  - cont home psych meds : FLUoxetine, mirtazapine   - started on ativan Po as needed for anxiety    Resp  #Hypoxic Resp Failure  - likely from pulm edema i/s of cardiogenic shock  -weaned from NIV to 4LNC   - now breathing comfortably. titrate for sat>92%  - ABG today: pH, Arterial: 7.45  pH, Blood: x     /  pCO2: 26    /  pO2: 133   / HCO3: 18    / Base Excess: -4.9  /  SaO2: 99.0        # history of lung cancer s/p RUL lobectomy (2022) with recurrent disease s/p radiation to left lung (completed 3/2023),   -Stage IV lung carcinoma, s/p local RT, never on systemic chemotherapy        Cardiac  #  chronic systolic congestive heart failure with cardiogenic shock   - TTE  EF 20% with RV systolic dysfx and moderate MR  -  AM numbers: CVP 26, CO3.1, CI 1.5, SVR 1282, SVO2 42% this AM  - lactate rise to 9 on   -  increased to 7.5 and bumex drip increased to 2mg/hr and given diuril for oliguria on   -  decrease  back to 5/hr and start Nitroprusside for afterload reduction on   - dobutamine increase back to 7.5mg for elevated lactate  -  started on hydralazine  - lactate 1.9 -->2.3  - VOL and Low flow status on exam  - Reena from Central venous sat 7/3  :CVP 25 CO 4 CI 1.9  CVO2 52.4 Lact 2.5 on  5/Bumex 2,   - Diuril 500mg IV x2, hypertonic 3%   x1, Diamox IV 500mg x1 on 7/3  - Nipride drip d/c'd   - Reena ; CVP 25, CO 4, CI 2,   -bumex drip decreased to 1mg/hr (due to sbp 70's-80's around 11 pm)  - REENA  CVP 20, CO 5.85, CI 2.82, .1, CVO2 57%, lactate 2.3 on  7.5mcg/kg/min and bumex 2mg/hr.  - strict I/o: IN: 1450 mL / OUT: 3855 mL / NET: -2404mL      # coronary artery disease   - LHC on 2022 showed RCA  and significant LAD and diagonal lesions-but was not revascularized due to non viable myocardium  - c/w Aspirin and Plavix   -resume home rosuvastatin 20mg daily (pt c/o intolerance to atorvastatin)    #SVT  - SVT at 130 in ED  - likely in setting of cardiogenic shock  -pt received amio bolus and drip, then BP has normalized, no further episodes of SVT.   -Amio d/c on        GI  #Transaminitis   - iso of VOL  - abdomen large  - DASH diet now pt comfortable off NIV  -cont protonix for GI ppx  -poor appetite  -nutrition consult        # BROYD on CKD iso cardiorenal  - Baseline creatinine: 2  -worsening crn likely from cardiogenic shock   - inc bumex drip and stat dose of diuril for oilguria and volume overload on   - worsening S creat, today  3  - monitor UO   -avoid nephrotoxins  - pt does not want HD if needed      Heme:  -cbc stable   -HSQ for DVT ppx      Endo:   -A1c 6.2  -TSH on  wnl   -maintain gluc<180 on bmp    LIne:   RIJ cordis on    Left arm double lumen PIcc line with no blood return->  placed on  at [facility]    Left upper extremity PICC with tip in the brachiocephalic vein on Cxr        #Ethics:  -Overnight, wife expressed wishing for home hospice care option, then this morning, she wants to hold off.   -Palliative team suggests scheduling hospice information session for the wife. , Rajendra on board.

## 2024-07-05 NOTE — DISCHARGE NOTE PROVIDER - NSDCFUSCHEDAPPT_GEN_ALL_CORE_FT
Que Alejandre  [facility] Physician [facility]  RADMED [address]  Scheduled Appointment: 07/11/2024    Jay Shell  [facility] Physician [facility]  HEARTFAIL [address]  Scheduled Appointment: 08/27/2024     Jay Shell  [facility] Physician [facility] [address]  Scheduled Appointment: 08/27/2024

## 2024-07-06 LAB
ALBUMIN SERPL ELPH-MCNC: 2.8 G/DL — LOW (ref 3.3–5)
ALBUMIN SERPL ELPH-MCNC: 2.9 G/DL — LOW (ref 3.3–5)
ALP SERPL-CCNC: 325 U/L — HIGH (ref 40–120)
ALP SERPL-CCNC: 353 U/L — HIGH (ref 40–120)
ALT FLD-CCNC: 59 U/L — HIGH (ref 4–41)
ALT FLD-CCNC: 63 U/L — HIGH (ref 4–41)
ANION GAP SERPL CALC-SCNC: 19 MMOL/L — HIGH (ref 7–14)
ANION GAP SERPL CALC-SCNC: 20 MMOL/L — HIGH (ref 7–14)
AST SERPL-CCNC: 106 U/L — HIGH (ref 4–40)
AST SERPL-CCNC: 120 U/L — HIGH (ref 4–40)
BILIRUB SERPL-MCNC: 3.3 MG/DL — HIGH (ref 0.2–1.2)
BILIRUB SERPL-MCNC: 3.4 MG/DL — HIGH (ref 0.2–1.2)
BLOOD GAS ARTERIAL COMPREHENSIVE RESULT: SIGNIFICANT CHANGE UP
BUN SERPL-MCNC: 100 MG/DL — HIGH (ref 7–23)
BUN SERPL-MCNC: 99 MG/DL — HIGH (ref 7–23)
CALCIUM SERPL-MCNC: 7.3 MG/DL — LOW (ref 8.4–10.5)
CALCIUM SERPL-MCNC: 7.4 MG/DL — LOW (ref 8.4–10.5)
CHLORIDE SERPL-SCNC: 89 MMOL/L — LOW (ref 98–107)
CHLORIDE SERPL-SCNC: 91 MMOL/L — LOW (ref 98–107)
CO2 SERPL-SCNC: 21 MMOL/L — LOW (ref 22–31)
CO2 SERPL-SCNC: 22 MMOL/L — SIGNIFICANT CHANGE UP (ref 22–31)
CREAT SERPL-MCNC: 3.42 MG/DL — HIGH (ref 0.5–1.3)
CREAT SERPL-MCNC: 3.6 MG/DL — HIGH (ref 0.5–1.3)
EGFR: 16 ML/MIN/1.73M2 — LOW
EGFR: 18 ML/MIN/1.73M2 — LOW
GLUCOSE SERPL-MCNC: 137 MG/DL — HIGH (ref 70–99)
GLUCOSE SERPL-MCNC: 89 MG/DL — SIGNIFICANT CHANGE UP (ref 70–99)
HCT VFR BLD CALC: 26.6 % — LOW (ref 39–50)
HGB BLD-MCNC: 9.6 G/DL — LOW (ref 13–17)
MAGNESIUM SERPL-MCNC: 2.1 MG/DL — SIGNIFICANT CHANGE UP (ref 1.6–2.6)
MAGNESIUM SERPL-MCNC: 2.1 MG/DL — SIGNIFICANT CHANGE UP (ref 1.6–2.6)
MCHC RBC-ENTMCNC: 30 PG — SIGNIFICANT CHANGE UP (ref 27–34)
MCHC RBC-ENTMCNC: 36.1 GM/DL — HIGH (ref 32–36)
MCV RBC AUTO: 83.1 FL — SIGNIFICANT CHANGE UP (ref 80–100)
NRBC # BLD: 0 /100 WBCS — SIGNIFICANT CHANGE UP (ref 0–0)
NRBC # FLD: 0 K/UL — SIGNIFICANT CHANGE UP (ref 0–0)
PHOSPHATE SERPL-MCNC: 5 MG/DL — HIGH (ref 2.5–4.5)
PHOSPHATE SERPL-MCNC: 5.4 MG/DL — HIGH (ref 2.5–4.5)
PLATELET # BLD AUTO: 93 K/UL — LOW (ref 150–400)
POTASSIUM SERPL-MCNC: 3.1 MMOL/L — LOW (ref 3.5–5.3)
POTASSIUM SERPL-MCNC: 3.6 MMOL/L — SIGNIFICANT CHANGE UP (ref 3.5–5.3)
POTASSIUM SERPL-SCNC: 3.1 MMOL/L — LOW (ref 3.5–5.3)
POTASSIUM SERPL-SCNC: 3.6 MMOL/L — SIGNIFICANT CHANGE UP (ref 3.5–5.3)
PROT SERPL-MCNC: 5.7 G/DL — LOW (ref 6–8.3)
PROT SERPL-MCNC: 6 G/DL — SIGNIFICANT CHANGE UP (ref 6–8.3)
RBC # BLD: 3.2 M/UL — LOW (ref 4.2–5.8)
RBC # FLD: 17.1 % — HIGH (ref 10.3–14.5)
SODIUM SERPL-SCNC: 130 MMOL/L — LOW (ref 135–145)
SODIUM SERPL-SCNC: 132 MMOL/L — LOW (ref 135–145)
WBC # BLD: 11.31 K/UL — HIGH (ref 3.8–10.5)
WBC # FLD AUTO: 11.31 K/UL — HIGH (ref 3.8–10.5)

## 2024-07-06 PROCEDURE — 99232 SBSQ HOSP IP/OBS MODERATE 35: CPT | Mod: FS

## 2024-07-06 RX ORDER — TRAMADOL HYDROCHLORIDE 50 MG/1
25 TABLET, FILM COATED ORAL ONCE
Refills: 0 | Status: DISCONTINUED | OUTPATIENT
Start: 2024-07-06 | End: 2024-07-06

## 2024-07-06 RX ORDER — POTASSIUM CHLORIDE 600 MG/1
40 TABLET, FILM COATED, EXTENDED RELEASE ORAL EVERY 4 HOURS
Refills: 0 | Status: COMPLETED | OUTPATIENT
Start: 2024-07-06 | End: 2024-07-07

## 2024-07-06 RX ORDER — POTASSIUM CHLORIDE 600 MG/1
40 TABLET, FILM COATED, EXTENDED RELEASE ORAL ONCE
Refills: 0 | Status: COMPLETED | OUTPATIENT
Start: 2024-07-06 | End: 2024-07-06

## 2024-07-06 RX ORDER — POTASSIUM CHLORIDE 600 MG/1
20 TABLET, FILM COATED, EXTENDED RELEASE ORAL
Refills: 0 | Status: COMPLETED | OUTPATIENT
Start: 2024-07-06 | End: 2024-07-06

## 2024-07-06 RX ADMIN — TRAMADOL HYDROCHLORIDE 25 MILLIGRAM(S): 50 TABLET, FILM COATED ORAL at 12:45

## 2024-07-06 RX ADMIN — LATANOPROST PF 1 DROP(S): 0.05 SOLUTION/ DROPS OPHTHALMIC at 00:53

## 2024-07-06 RX ADMIN — CLOPIDOGREL BISULFATE 75 MILLIGRAM(S): 75 TABLET, FILM COATED ORAL at 12:31

## 2024-07-06 RX ADMIN — PANTOPRAZOLE SODIUM 40 MILLIGRAM(S): 40 INJECTION, POWDER, FOR SOLUTION INTRAVENOUS at 06:03

## 2024-07-06 RX ADMIN — Medication 19.2 MICROGRAM(S)/KG/MIN: at 03:55

## 2024-07-06 RX ADMIN — MIRTAZAPINE 15 MILLIGRAM(S): 15 TABLET, FILM COATED ORAL at 22:07

## 2024-07-06 RX ADMIN — POTASSIUM CHLORIDE 100 MILLIEQUIVALENT(S): 600 TABLET, FILM COATED, EXTENDED RELEASE ORAL at 01:32

## 2024-07-06 RX ADMIN — Medication 1 APPLICATION(S): at 12:31

## 2024-07-06 RX ADMIN — Medication 3 MILLIGRAM(S): at 22:06

## 2024-07-06 RX ADMIN — TAMSULOSIN HYDROCHLORIDE 0.8 MILLIGRAM(S): 0.4 CAPSULE ORAL at 22:06

## 2024-07-06 RX ADMIN — ROSUVASTATIN CALCIUM 20 MILLIGRAM(S): 20 TABLET ORAL at 00:52

## 2024-07-06 RX ADMIN — POTASSIUM CHLORIDE 100 MILLIEQUIVALENT(S): 600 TABLET, FILM COATED, EXTENDED RELEASE ORAL at 02:38

## 2024-07-06 RX ADMIN — HYDRALAZINE HYDROCHLORIDE 10 MILLIGRAM(S): 50 TABLET ORAL at 06:02

## 2024-07-06 RX ADMIN — LATANOPROST PF 1 DROP(S): 0.05 SOLUTION/ DROPS OPHTHALMIC at 22:10

## 2024-07-06 RX ADMIN — HEPARIN SODIUM 5000 UNIT(S): 50 INJECTION, SOLUTION INTRAVENOUS at 18:07

## 2024-07-06 RX ADMIN — LIDOCAINE HCL 1 APPLICATION(S): 28 GEL TOPICAL at 14:38

## 2024-07-06 RX ADMIN — BUMETANIDE 5 MG/HR: 0.25 INJECTION INTRAMUSCULAR; INTRAVENOUS at 03:51

## 2024-07-06 RX ADMIN — Medication 19.2 MICROGRAM(S)/KG/MIN: at 12:45

## 2024-07-06 RX ADMIN — Medication 0.25 MILLIGRAM(S): at 22:08

## 2024-07-06 RX ADMIN — POTASSIUM CHLORIDE 40 MILLIEQUIVALENT(S): 600 TABLET, FILM COATED, EXTENDED RELEASE ORAL at 06:01

## 2024-07-06 RX ADMIN — TRAMADOL HYDROCHLORIDE 25 MILLIGRAM(S): 50 TABLET, FILM COATED ORAL at 12:55

## 2024-07-06 RX ADMIN — HEPARIN SODIUM 5000 UNIT(S): 50 INJECTION, SOLUTION INTRAVENOUS at 06:02

## 2024-07-06 RX ADMIN — Medication 19.2 MICROGRAM(S)/KG/MIN: at 20:10

## 2024-07-06 RX ADMIN — ASPIRIN 81 MILLIGRAM(S): 325 TABLET, FILM COATED ORAL at 12:30

## 2024-07-06 RX ADMIN — POTASSIUM CHLORIDE 40 MILLIEQUIVALENT(S): 600 TABLET, FILM COATED, EXTENDED RELEASE ORAL at 22:06

## 2024-07-06 RX ADMIN — ROSUVASTATIN CALCIUM 20 MILLIGRAM(S): 20 TABLET ORAL at 22:08

## 2024-07-06 RX ADMIN — BUMETANIDE 5 MG/HR: 0.25 INJECTION INTRAMUSCULAR; INTRAVENOUS at 20:10

## 2024-07-06 NOTE — PROGRESS NOTE ADULT - ASSESSMENT
79 year old male known to the [facility] HF clinic (Followed by Dr. Shell), with PMH of CAD with STEMI  with cardiogenic shock and IABP placement,  but was not revascularized due to non viable myocardium, ICM (TTE 24 LVEF 20%, RV systolic dysfunction, moderate MR/TR), LV thrombus, prior lung adenocarcinoma with RUL VATS in 2022 s/p radiation therapy comes in with complaints of SOB, LE edema and hypotension. Pt started on bipap, swan placed, pt started on ionotropes and admitted to CCU for management     Neuro  # bipolar disorder/depression/anxiety   - AxOX3  - intermittent lethargy likely from shock state > mental status improved Rass 0 w/no lethargy   - cont home psych meds : FLUoxetine, mirtazapine   - started on ativan Po as needed for anxiety    Resp  #Hypoxic Resp Failure  - likely from pulm edema i/s of cardiogenic shock  -weaned from NIV to 4LNC  > currently on 2L NC  - now breathing comfortably. titrate for sat>92%  - ABG today: pH, Arterial:  pH 7.49  Blood: x     /  pCO2: 30    /  pO2: 121   / HCO3: 23    / Base Excess: 0.1  /  SaO2: 98.0        # history of lung cancer s/p RUL lobectomy (2022) with recurrent disease s/p radiation to left lung (completed 3/2023),   -Stage IV lung carcinoma, s/p local RT, never on systemic chemotherapy        Cardiac  #  chronic systolic congestive heart failure with cardiogenic shock   - TTE  EF 20% with RV systolic dysfx and moderate MR  -  AM numbers: CVP 26, CO3.1, CI 1.5, SVR 1282, SVO2 42% this AM  - lactate rise to 9 on   -  increased to 7.5 and bumex drip increased to 2mg/hr and given diuril for oliguria on   -  decrease  back to 5/hr and start Nitroprusside for afterload reduction on   - dobutamine increase back to 7.5mg for elevated lactate  -  started on hydralazine  - lactate 1.9 -->2.3  - VOL and Low flow status on exam  - Swapnil from Central venous sat 7/3  :CVP 25 CO 4 CI 1.9  CVO2 52.4 Lact 2.5 on  5/Bumex 2,   - Diuril 500mg IV x2, hypertonic 3%   x1, Diamox IV 500mg x1 on 7/3  - Nipride drip d/c'd   - Swapnil ; CVP 25, CO 4, CI 2,   - bumex drip currently 1mg/hr (due to sbp 70's-80's around 11 pm)  - CVP 16,  7.5mcg/kg/min and bumex 1mg/hr.  - strict I/o: NET: -1.8L      # coronary artery disease   - LHC on 2022 showed RCA  and significant LAD and diagonal lesions-but was not revascularized due to non viable myocardium  - c/w Aspirin and Plavix   -resume home rosuvastatin 20mg daily (pt c/o intolerance to atorvastatin)    #SVT  - SVT at 130 in ED  - likely in setting of cardiogenic shock  -pt received amio bolus and drip, then BP has normalized, no further episodes of SVT.   -Amio d/c on        GI  #Transaminitis   - iso of VOL  - abdomen large  - DASH diet now pt comfortable off NIV  -cont protonix for GI ppx  -poor appetite  -nutrition consult        # BRODY on CKD iso cardiorenal  - Baseline creatinine: 2  -worsening crn likely from cardiogenic shock   - inc bumex drip and stat dose of diuril for oilguria and volume overload on   - worsening S creat, today  3  - monitor UO   -avoid nephrotoxins  - pt does not want HD if needed      Heme:  -cbc stable   -HSQ for DVT ppx      Endo:   -A1c 6.2  -TSH on  wnl   -maintain gluc<180 on bmp    LIne:   RIJ cordis on    Left arm double lumen PIcc line with no blood return->  placed on  at [facility]    Left upper extremity PICC with tip in the brachiocephalic vein on Cxr        #Ethics:  -Overnight, wife expressed wishing for home hospice care option, then this morning, she wants to hold off.   -Palliative team suggests scheduling hospice information session for the wife. , Rajendra on board.

## 2024-07-06 NOTE — PROGRESS NOTE ADULT - SUBJECTIVE AND OBJECTIVE BOX
Patient is a 79y old  Male who presents with a chief complaint of decompensated Heart failure (05 Jul 2024 16:59)    HPI:   79 year old male known to the [facility] HF clinic (Followed by Dr. Shell), with PMH of CAD with STEMI 5/20222 with cardiogenic shock and IABP placement,  but was not revascularized due to non viable myocardium, ICM (TTE 6/6/24 LVEF 20%, RV systolic dysfunction, moderate MR/TR), LV thrombus, prior lung adenocarcinoma with RUL VATS in 11/2022 s/p radiation therapy comes in with complaints of SOB, LE and hypotension. Of note- patient has been seen at [facility] 5 x this year for SOB, BRODY and was placed on Dobutamine gtt home infusion. Per patient's wife he was unable to take his diuretics due to hypotension at home for which in hopes of raising BP he was given a salt load with bullions. He continued to worsen, and patient's wife called [facility] HF clinic and he was advised to come to ED for immediate care. In ED patient is tachypenic, tachycardic with BP 60-130s (SVT), hypotensive with SBP 70-90s, O2 sat 70s, and was placed on BIPAP. Home Dobutamine gtt was held in ED due to SVT,  Started on amiodarone drip and  restarted on Dobutamine drip . Patient admits to SOB at rest, worsening LE edema and weakness. Labs show H/H 11.5/33.5, BRODY BUN 97/3.09, Na 134, Cl 92, Alk phos 425, AST 75, ALT 36, pro BNP 51172, lactate 3.4. Patient was seen by heart failure team and received bumex 4mg IV x1 followed by Bumex drip. RIJ cordis was placed. Patient was admitted for acute on chronic heart failure with hypotension/ volume overload.  (01 Jul 2024 17:31)       INTERVAL HPI/OVERNIGHT EVENTS:   No overnight events   Afebrile, hemodynamically stable     Subjective:    ICU Vital Signs Last 24 Hrs  T(C): 36.4 (06 Jul 2024 07:21), Max: 36.4 (06 Jul 2024 07:21)  T(F): 97.5 (06 Jul 2024 07:21), Max: 97.5 (06 Jul 2024 07:21)  HR: 86 (06 Jul 2024 08:00) (64 - 86)  BP: --  BP(mean): --  ABP: 80/46 (06 Jul 2024 08:00) (80/46 - 138/89)  ABP(mean): 58 (06 Jul 2024 08:00) (56 - 108)  RR: 15 (06 Jul 2024 08:00) (9 - 28)  SpO2: 100% (06 Jul 2024 08:00) (95% - 100%)    O2 Parameters below as of 06 Jul 2024 08:00  Patient On (Oxygen Delivery Method): nasal cannula  O2 Flow (L/min): 2        I&O's Summary    05 Jul 2024 07:01  -  06 Jul 2024 07:00  --------------------------------------------------------  IN: 1624 mL / OUT: 3430 mL / NET: -1806 mL          Daily     Daily       EKG/Telemetry Events:    MEDICATIONS  (STANDING):  aspirin enteric coated 81 milliGRAM(s) Oral daily  buMETAnide Infusion 1 mG/Hr (5 mL/Hr) IV Continuous <Continuous>  chlorhexidine 2% Cloths 1 Application(s) Topical <User Schedule>  clopidogrel Tablet 75 milliGRAM(s) Oral daily  DOBUTamine Infusion 7.5 MICROgram(s)/kG/Min (19.2 mL/Hr) IV Continuous <Continuous>  FLUoxetine 20 milliGRAM(s) Oral <User Schedule>  heparin   Injectable 5000 Unit(s) SubCutaneous every 12 hours  hydrALAZINE 10 milliGRAM(s) Oral every 8 hours  latanoprost 0.005% Ophthalmic Solution 1 Drop(s) Both EYES at bedtime  lidocaine 4% Topical Solution 1 Application(s) Topical daily  LORazepam     Tablet 0.25 milliGRAM(s) Oral at bedtime  melatonin 3 milliGRAM(s) Oral at bedtime  mirtazapine 15 milliGRAM(s) Oral at bedtime  pantoprazole    Tablet 40 milliGRAM(s) Oral before breakfast  rosuvastatin 20 milliGRAM(s) Oral at bedtime  tamsulosin 0.8 milliGRAM(s) Oral at bedtime    MEDICATIONS  (PRN):  acetaminophen     Tablet .. 325 milliGRAM(s) Oral every 4 hours PRN Moderate Pain (4 - 6)  lidocaine   4% Patch 1 Patch Transdermal every 24 hours PRN pain  LORazepam     Tablet 0.25 milliGRAM(s) Oral at bedtime PRN Anxiety  sodium chloride 0.9% lock flush 10 milliLiter(s) IV Push every 1 hour PRN Pre/post blood products, medications, blood draw, and to maintain line patency      PHYSICAL EXAM:  Constitutional: well appearing  Neck: supple, no JVD  Respiratory: Diminished breath sounds bilaterally,  no use of extra accessory muscles.  Cardiovascular: RRR, normal S1, S2, no gallops, +murmurs.   Gastrointestinal: soft, non-tender, non-distended, normal bowel sounds,  Extremities: no edema, no clubbing, no cyanosis  Vascular: palpable peripheral pulses, no cyanosis,  Neurological: Awake, follows commands  Skin: no skin lesions, no rash, no ulceration      LABS:                        9.6    11.31 )-----------( 93       ( 06 Jul 2024 04:20 )             26.6     07-06    130<L>  |  89<L>  |  99<H>  ----------------------------<  89  3.6   |  21<L>  |  3.60<H>    Ca    7.4<L>      06 Jul 2024 04:20  Phos  5.4     07-06  Mg     2.10     07-06    TPro  5.7<L>  /  Alb  2.8<L>  /  TBili  3.4<H>  /  DBili  x   /  AST  120<H>  /  ALT  63<H>  /  AlkPhos  325<H>  07-06    LIVER FUNCTIONS - ( 06 Jul 2024 04:20 )  Alb: 2.8 g/dL / Pro: 5.7 g/dL / ALK PHOS: 325 U/L / ALT: 63 U/L / AST: 120 U/L / GGT: x             CAPILLARY BLOOD GLUCOSE        ABG - ( 06 Jul 2024 04:20 )  pH, Arterial: 7.49  pH, Blood: x     /  pCO2: 30    /  pO2: 125   / HCO3: 23    / Base Excess: 0.1   /  SaO2: 98.5                    Urinalysis Basic - ( 06 Jul 2024 04:20 )    Color: x / Appearance: x / SG: x / pH: x  Gluc: 89 mg/dL / Ketone: x  / Bili: x / Urobili: x   Blood: x / Protein: x / Nitrite: x   Leuk Esterase: x / RBC: x / WBC x   Sq Epi: x / Non Sq Epi: x / Bacteria: x          RADIOLOGY & ADDITIONAL TESTS:  CXR:        Care Discussed with Consultants/Other Providers [ x] YES  [ ] NO

## 2024-07-07 LAB
ALBUMIN SERPL ELPH-MCNC: 2.9 G/DL — LOW (ref 3.3–5)
ALP SERPL-CCNC: 342 U/L — HIGH (ref 40–120)
ALT FLD-CCNC: 55 U/L — HIGH (ref 4–41)
ANION GAP SERPL CALC-SCNC: 19 MMOL/L — HIGH (ref 7–14)
APPEARANCE UR: ABNORMAL
AST SERPL-CCNC: 98 U/L — HIGH (ref 4–40)
BASE EXCESS BLDV CALC-SCNC: 3.3 MMOL/L — HIGH (ref -2–3)
BILIRUB SERPL-MCNC: 3.4 MG/DL — HIGH (ref 0.2–1.2)
BILIRUB UR-MCNC: NEGATIVE — SIGNIFICANT CHANGE UP
BLOOD GAS ARTERIAL COMPREHENSIVE RESULT: SIGNIFICANT CHANGE UP
BLOOD GAS VENOUS COMPREHENSIVE RESULT: SIGNIFICANT CHANGE UP
BUN SERPL-MCNC: 90 MG/DL — HIGH (ref 7–23)
CALCIUM SERPL-MCNC: 7.6 MG/DL — LOW (ref 8.4–10.5)
CHLORIDE BLDV-SCNC: 95 MMOL/L — LOW (ref 96–108)
CHLORIDE SERPL-SCNC: 91 MMOL/L — LOW (ref 98–107)
CO2 BLDV-SCNC: 29.1 MMOL/L — HIGH (ref 22–26)
CO2 SERPL-SCNC: 23 MMOL/L — SIGNIFICANT CHANGE UP (ref 22–31)
COLOR SPEC: ABNORMAL
CREAT SERPL-MCNC: 3.28 MG/DL — HIGH (ref 0.5–1.3)
DIFF PNL FLD: ABNORMAL
EGFR: 18 ML/MIN/1.73M2 — LOW
GAS PNL BLDV: 129 MMOL/L — LOW (ref 136–145)
GLUCOSE BLDV-MCNC: 97 MG/DL — SIGNIFICANT CHANGE UP (ref 70–99)
GLUCOSE SERPL-MCNC: 109 MG/DL — HIGH (ref 70–99)
GLUCOSE UR QL: NEGATIVE MG/DL — SIGNIFICANT CHANGE UP
HCO3 BLDV-SCNC: 28 MMOL/L — SIGNIFICANT CHANGE UP (ref 22–29)
HCT VFR BLD CALC: 26.9 % — LOW (ref 39–50)
HCT VFR BLDA CALC: 29 % — LOW (ref 39–51)
HGB BLD CALC-MCNC: 9.7 G/DL — LOW (ref 12.6–17.4)
HGB BLD-MCNC: 9.4 G/DL — LOW (ref 13–17)
KETONES UR-MCNC: NEGATIVE MG/DL — SIGNIFICANT CHANGE UP
LACTATE BLDV-MCNC: 1.5 MMOL/L — SIGNIFICANT CHANGE UP (ref 0.5–2)
LEUKOCYTE ESTERASE UR-ACNC: ABNORMAL
MAGNESIUM SERPL-MCNC: 2.1 MG/DL — SIGNIFICANT CHANGE UP (ref 1.6–2.6)
MCHC RBC-ENTMCNC: 29.6 PG — SIGNIFICANT CHANGE UP (ref 27–34)
MCHC RBC-ENTMCNC: 34.9 GM/DL — SIGNIFICANT CHANGE UP (ref 32–36)
MCV RBC AUTO: 84.6 FL — SIGNIFICANT CHANGE UP (ref 80–100)
NITRITE UR-MCNC: NEGATIVE — SIGNIFICANT CHANGE UP
NRBC # BLD: 0 /100 WBCS — SIGNIFICANT CHANGE UP (ref 0–0)
NRBC # FLD: 0 K/UL — SIGNIFICANT CHANGE UP (ref 0–0)
PCO2 BLDV: 41 MMHG — LOW (ref 42–55)
PH BLDV: 7.44 — HIGH (ref 7.32–7.43)
PH UR: 5.5 — SIGNIFICANT CHANGE UP (ref 5–8)
PHOSPHATE SERPL-MCNC: 5.3 MG/DL — HIGH (ref 2.5–4.5)
PLATELET # BLD AUTO: 93 K/UL — LOW (ref 150–400)
PO2 BLDV: 40 MMHG — SIGNIFICANT CHANGE UP (ref 25–45)
POTASSIUM BLDV-SCNC: 2.9 MMOL/L — CRITICAL LOW (ref 3.5–5.1)
POTASSIUM SERPL-MCNC: 3.2 MMOL/L — LOW (ref 3.5–5.3)
POTASSIUM SERPL-SCNC: 3.2 MMOL/L — LOW (ref 3.5–5.3)
PROT SERPL-MCNC: 5.8 G/DL — LOW (ref 6–8.3)
PROT UR-MCNC: 100 MG/DL
RBC # BLD: 3.18 M/UL — LOW (ref 4.2–5.8)
RBC # FLD: 17.3 % — HIGH (ref 10.3–14.5)
SAO2 % BLDV: 66.9 % — LOW (ref 67–88)
SODIUM SERPL-SCNC: 133 MMOL/L — LOW (ref 135–145)
SP GR SPEC: 1.01 — SIGNIFICANT CHANGE UP (ref 1–1.03)
UROBILINOGEN FLD QL: 2 MG/DL (ref 0.2–1)
WBC # BLD: 8.67 K/UL — SIGNIFICANT CHANGE UP (ref 3.8–10.5)
WBC # FLD AUTO: 8.67 K/UL — SIGNIFICANT CHANGE UP (ref 3.8–10.5)

## 2024-07-07 PROCEDURE — 99232 SBSQ HOSP IP/OBS MODERATE 35: CPT | Mod: FS

## 2024-07-07 RX ORDER — IPRATROPIUM BROMIDE AND ALBUTEROL SULFATE .5; 3 MG/3ML; MG/3ML
3 SOLUTION RESPIRATORY (INHALATION) ONCE
Refills: 0 | Status: COMPLETED | OUTPATIENT
Start: 2024-07-07 | End: 2024-07-07

## 2024-07-07 RX ORDER — POTASSIUM CHLORIDE 600 MG/1
40 TABLET, FILM COATED, EXTENDED RELEASE ORAL EVERY 8 HOURS
Refills: 0 | Status: COMPLETED | OUTPATIENT
Start: 2024-07-07 | End: 2024-07-08

## 2024-07-07 RX ORDER — POTASSIUM CHLORIDE 600 MG/1
40 TABLET, FILM COATED, EXTENDED RELEASE ORAL EVERY 8 HOURS
Refills: 0 | Status: DISCONTINUED | OUTPATIENT
Start: 2024-07-07 | End: 2024-07-07

## 2024-07-07 RX ORDER — POTASSIUM CHLORIDE 600 MG/1
40 TABLET, FILM COATED, EXTENDED RELEASE ORAL ONCE
Refills: 0 | Status: COMPLETED | OUTPATIENT
Start: 2024-07-07 | End: 2024-07-07

## 2024-07-07 RX ADMIN — HEPARIN SODIUM 5000 UNIT(S): 50 INJECTION, SOLUTION INTRAVENOUS at 05:42

## 2024-07-07 RX ADMIN — POTASSIUM CHLORIDE 40 MILLIEQUIVALENT(S): 600 TABLET, FILM COATED, EXTENDED RELEASE ORAL at 17:30

## 2024-07-07 RX ADMIN — HYDRALAZINE HYDROCHLORIDE 10 MILLIGRAM(S): 50 TABLET ORAL at 05:41

## 2024-07-07 RX ADMIN — BUMETANIDE 5 MG/HR: 0.25 INJECTION INTRAMUSCULAR; INTRAVENOUS at 19:59

## 2024-07-07 RX ADMIN — Medication 19.2 MICROGRAM(S)/KG/MIN: at 18:50

## 2024-07-07 RX ADMIN — Medication 1 APPLICATION(S): at 05:42

## 2024-07-07 RX ADMIN — PANTOPRAZOLE SODIUM 40 MILLIGRAM(S): 40 INJECTION, POWDER, FOR SOLUTION INTRAVENOUS at 06:26

## 2024-07-07 RX ADMIN — HYDRALAZINE HYDROCHLORIDE 10 MILLIGRAM(S): 50 TABLET ORAL at 14:35

## 2024-07-07 RX ADMIN — BUMETANIDE 5 MG/HR: 0.25 INJECTION INTRAMUSCULAR; INTRAVENOUS at 18:50

## 2024-07-07 RX ADMIN — LATANOPROST PF 1 DROP(S): 0.05 SOLUTION/ DROPS OPHTHALMIC at 22:20

## 2024-07-07 RX ADMIN — Medication 19.2 MICROGRAM(S)/KG/MIN: at 19:59

## 2024-07-07 RX ADMIN — POTASSIUM CHLORIDE 40 MILLIEQUIVALENT(S): 600 TABLET, FILM COATED, EXTENDED RELEASE ORAL at 10:47

## 2024-07-07 RX ADMIN — HEPARIN SODIUM 5000 UNIT(S): 50 INJECTION, SOLUTION INTRAVENOUS at 17:30

## 2024-07-07 RX ADMIN — POTASSIUM CHLORIDE 40 MILLIEQUIVALENT(S): 600 TABLET, FILM COATED, EXTENDED RELEASE ORAL at 05:41

## 2024-07-07 RX ADMIN — Medication 0.25 MILLIGRAM(S): at 22:10

## 2024-07-07 RX ADMIN — IPRATROPIUM BROMIDE AND ALBUTEROL SULFATE 3 MILLILITER(S): .5; 3 SOLUTION RESPIRATORY (INHALATION) at 17:12

## 2024-07-07 RX ADMIN — CLOPIDOGREL BISULFATE 75 MILLIGRAM(S): 75 TABLET, FILM COATED ORAL at 12:38

## 2024-07-07 RX ADMIN — TAMSULOSIN HYDROCHLORIDE 0.8 MILLIGRAM(S): 0.4 CAPSULE ORAL at 22:13

## 2024-07-07 RX ADMIN — HYDRALAZINE HYDROCHLORIDE 10 MILLIGRAM(S): 50 TABLET ORAL at 22:19

## 2024-07-07 RX ADMIN — MIRTAZAPINE 15 MILLIGRAM(S): 15 TABLET, FILM COATED ORAL at 22:19

## 2024-07-07 RX ADMIN — Medication 3 MILLIGRAM(S): at 22:19

## 2024-07-07 RX ADMIN — ASPIRIN 81 MILLIGRAM(S): 325 TABLET, FILM COATED ORAL at 12:37

## 2024-07-07 RX ADMIN — ROSUVASTATIN CALCIUM 20 MILLIGRAM(S): 20 TABLET ORAL at 22:19

## 2024-07-07 RX ADMIN — LIDOCAINE HCL 1 APPLICATION(S): 28 GEL TOPICAL at 12:38

## 2024-07-07 NOTE — CHART NOTE - NSCHARTNOTEFT_GEN_A_CORE
Pt with hematuria. Asymptomatic. Discussed with urology with plan to discontinue gerardo, trial of void and send UA to r/o infection v prostate trauma.

## 2024-07-07 NOTE — DIETITIAN INITIAL EVALUATION ADULT - FLUID ACCUMULATION
As per RN flowsheet generalized edema noted at this time.  As per RN flowsheet generalized edema 1+ noted at this time.

## 2024-07-07 NOTE — DIETITIAN INITIAL EVALUATION ADULT - OTHER INFO
Patient seen at bedside  Pt' spouse provide most of collateral. Pt reported fair intake of meals today. As per nursing flowsheet patient with variable PO intake 0-75% noted. Food and fluid preferences explored and noted. Pt amenable to nutritional supplement. No GI distress (nausea/vomiting/diarrhea/constipation) per chart. Pt reported last BM yesterday. No chewing or swallowing difficulties at this time per pt. As per previous RD note 6/13, UBW 91kg (6/9), current dosing wt 85.2 (7/1), daily wt 83.1kg (7/7). Weight loss x1 month (8.6%). Continue with daily weights to monitor weight trend. Labs noted for hyponatremia, hypokalemia, elevated BUN, Cr, and hyperphosphatemia. As per MD "pt does not want HD if needed". Palliative team suggests scheduling hospice information session for the wife. RDN services remain available as needed.

## 2024-07-07 NOTE — DIETITIAN INITIAL EVALUATION ADULT - ORAL INTAKE PTA/DIET HISTORY
Patient reported "not eating very much as he used to" PTA, consumes a regular diet at baseline. Pt confirms NKFA, food intolerance. Pt reported UBW  189-190lbs PTA.

## 2024-07-07 NOTE — DIETITIAN INITIAL EVALUATION ADULT - OTHER CALCULATIONS
Defer fluid needs to MD/Team.   Ideal Body Weight: 178lbs / 80.7kg +/-10%. IBW used to calculate estimated nutritional needs.

## 2024-07-07 NOTE — DIETITIAN INITIAL EVALUATION ADULT - REASON FOR ADMISSION
Per chart review, Pt is 79 year old male with PMH: CAD with STEMI 5/20222 with cardiogenic shock and IABP placement,  but was not revascularized due to non viable myocardium, ICM (TTE 6/6/24 LVEF 20%, RV systolic dysfunction, moderate MR/TR), LV thrombus, prior lung adenocarcinoma with RUL VATS in 11/2022 s/p radiation therapy comes in with complaints of SOB, LE edema and hypotension. Pt started on bipap, swan placed, pt started on ionotropes and admitted to CCU for management

## 2024-07-07 NOTE — DIETITIAN INITIAL EVALUATION ADULT - SIGNS/SYMPTOMS
elevated BUN, Cr, Phos Consumes </=75% of nutritional needs >/1 month. Weight loss in 1 month (8.6%).

## 2024-07-07 NOTE — DIETITIAN INITIAL EVALUATION ADULT - ADD RECOMMEND
- Continue current diet order.  Defer food and fluid consistency to SLP/Team.   - Consider Suplena 8oz, 2x daily (420kcal/10.6gm prot/serving).   - Please consistently document % PO intake in nursing flowsheets to assess adequacy of nutritional intake/monitor need for further nutritional intervention(s).   - Monitor weights, diet tolerance, skin integrity, BMs, pertinent labs.  - RDN services remain available as needed.   - Nutrition department to honor food preferences as feasible.

## 2024-07-07 NOTE — PROGRESS NOTE ADULT - SUBJECTIVE AND OBJECTIVE BOX
CCU Service  Cardiology   Spect: [phone] ([facility])     PATIENT: ELYSE MALAVE, MRN: [id number]    79 year old male known to the [facility] HF clinic (Followed by Dr. Shell), with PMH of CAD with STEMI  with cardiogenic shock and IABP placement,  but was not revascularized due to non viable myocardium, ICM (TTE 24 LVEF 20%, RV systolic dysfunction, moderate MR/TR), LV thrombus, prior lung adenocarcinoma with RUL VATS in 2022 s/p radiation therapy comes in with complaints of SOB, LE edema and hypotension. Pt started on bipap, swan placed, pt started on inotropes     transferred to CCU for inotropic support    INTERVAL HISTORY/OVERNIGHT EVENTS:     TELEMETRY:     EKG:       ALLERGIES: Allergies    No Known Allergies    Intolerances    atorvastatin (Muscle Pain (Severe))      MEDICATIONS:  MEDICATIONS  (STANDING):  aspirin enteric coated 81 milliGRAM(s) Oral daily  buMETAnide Infusion 1 mG/Hr (5 mL/Hr) IV Continuous <Continuous>  chlorhexidine 2% Cloths 1 Application(s) Topical <User Schedule>  clopidogrel Tablet 75 milliGRAM(s) Oral daily  DOBUTamine Infusion 7.5 MICROgram(s)/kG/Min (19.2 mL/Hr) IV Continuous <Continuous>  FLUoxetine 20 milliGRAM(s) Oral <User Schedule>  heparin   Injectable 5000 Unit(s) SubCutaneous every 12 hours  hydrALAZINE 10 milliGRAM(s) Oral every 8 hours  latanoprost 0.005% Ophthalmic Solution 1 Drop(s) Both EYES at bedtime  lidocaine 4% Topical Solution 1 Application(s) Topical daily  LORazepam     Tablet 0.25 milliGRAM(s) Oral at bedtime  melatonin 3 milliGRAM(s) Oral at bedtime  mirtazapine 15 milliGRAM(s) Oral at bedtime  pantoprazole    Tablet 40 milliGRAM(s) Oral before breakfast  rosuvastatin 20 milliGRAM(s) Oral at bedtime  tamsulosin 0.8 milliGRAM(s) Oral at bedtime    MEDICATIONS  (PRN):  acetaminophen     Tablet .. 325 milliGRAM(s) Oral every 4 hours PRN Moderate Pain (4 - 6)  lidocaine   4% Patch 1 Patch Transdermal every 24 hours PRN pain  LORazepam     Tablet 0.25 milliGRAM(s) Oral at bedtime PRN Anxiety  sodium chloride 0.9% lock flush 10 milliLiter(s) IV Push every 1 hour PRN Pre/post blood products, medications, blood draw, and to maintain line patency        OBJECTIVE:  ICU Vital Signs Last 24 Hrs  T(C): 35.6 (2024 00:00), Max: 36.4 (2024 07:21)  T(F): 96 (2024 00:00), Max: 97.5 (2024 07:21)  HR: 77 (2024 05:00) (74 - 86)  BP: --  BP(mean): --  ABP: 90/46 (2024 05:00) (80/46 - 105/50)  ABP(mean): 61 (2024 05:00) (52 - 68)  RR: 18 (2024 05:00) (10 - 32)  SpO2: 96% (2024 05:00) (96% - 100%)    O2 Parameters below as of 2024 05:00  Patient On (Oxygen Delivery Method): nasal cannula            I&O's Summary    2024 07:01  -  2024 07:00  --------------------------------------------------------  IN: 1624 mL / OUT: 3430 mL / NET: -1806 mL    2024 07:01  -  2024 06:16  --------------------------------------------------------  IN: 1558.2 mL / OUT: 2415 mL / NET: -856.8 mL      Daily     Daily       PHYSICAL EXAMINATION:  General: Comfortable, no acute distress, cooperative with exam.  HEENT: Moist mucous membranes.  Respiratory: CTAB, normal respiratory effort, no coughing, wheezes, crackles, or rales.  CV: RRR, S1S2, no murmurs, rubs or gallops. No JVD. Distal pulses intact.  Abdominal: Soft, nontender, nondistended, no rebound or guarding, normal bowel sounds.  Neurology: AOx3, no focal neuro defects, GARCIA x 4.  Extremities: No pitting edema, + Peripheral pulses.          LABS:  ABG - ( 2024 04:20 )  pH, Arterial: 7.49  pH, Blood: x     /  pCO2: 30    /  pO2: 125   / HCO3: 23    / Base Excess: 0.1   /  SaO2: 98.5                                    9.6    11.31 )-----------( 93       ( 2024 04:20 )             26.6     07-06    132<L>  |  91<L>  |  100<H>  ----------------------------<  137<H>  3.1<L>   |  22  |  3.42<H>    Ca    7.3<L>      2024 15:15  Phos  5.0     07-  Mg     2.10     07-    TPro  6.0  /  Alb  2.9<L>  /  TBili  3.3<H>  /  DBili  x   /  AST  106<H>  /  ALT  59<H>  /  AlkPhos  353<H>  -    LIVER FUNCTIONS - ( 2024 15:15 )  Alb: 2.9 g/dL / Pro: 6.0 g/dL / ALK PHOS: 353 U/L / ALT: 59 U/L / AST: 106 U/L / GGT: x                   Urinalysis Basic - ( 2024 15:15 )    Color: x / Appearance: x / SG: x / pH: x  Gluc: 137 mg/dL / Ketone: x  / Bili: x / Urobili: x   Blood: x / Protein: x / Nitrite: x   Leuk Esterase: x / RBC: x / WBC x   Sq Epi: x / Non Sq Epi: x / Bacteria: x        Assessment and Plan:   · Assessment	  79 year old male known to the [facility] HF clinic (Followed by Dr. Shell), with PMH of CAD with STEMI  with cardiogenic shock and IABP placement,  but was not revascularized due to non viable myocardium, ICM (TTE 24 LVEF 20%, RV systolic dysfunction, moderate MR/TR), LV thrombus, prior lung adenocarcinoma with RUL VATS in 2022 s/p radiation therapy comes in with complaints of SOB, LE edema and hypotension. Pt started on bipap, swan placed, pt started on ionotropes and admitted to CCU for management     Neuro  # bipolar disorder/depression/anxiety   - AxOX3  - intermittent lethargy likely from shock state > mental status improved Rass 0 w/no lethargy   - cont home psych meds : FLUoxetine, mirtazapine   - started on ativan Po as needed for anxiety    Resp  #Hypoxic Resp Failure  - likely from pulm edema i/s of cardiogenic shock  -weaned from NIV to 4LNC  > currently on 2L NC  - now breathing comfortably. titrate for sat>92%  - ABG today: pH, Arterial:  pH 7.49  Blood: x     /  pCO2: 30    /  pO2: 121   / HCO3: 23    / Base Excess: 0.1  /  SaO2: 98.0        # history of lung cancer s/p RUL lobectomy (2022) with recurrent disease s/p radiation to left lung (completed 3/2023),   -Stage IV lung carcinoma, s/p local RT, never on systemic chemotherapy        Cardiac  #  chronic systolic congestive heart failure with cardiogenic shock   - TTE  EF 20% with RV systolic dysfx and moderate MR  -  AM numbers: CVP 26, CO3.1, CI 1.5, SVR 1282, SVO2 42% this AM  - lactate rise to 9 on   -  increased to 7.5 and bumex drip increased to 2mg/hr and given diuril for oliguria on   -  decrease  back to 5/hr and start Nitroprusside for afterload reduction on   - dobutamine increase back to 7.5mg for elevated lactate  -  started on hydralazine  - lactate 1.9 -->2.3  - VOL and Low flow status on exam  - Swapnil from Central venous sat 7/3  :CVP 25 CO 4 CI 1.9  CVO2 52.4 Lact 2.5 on  5/Bumex 2,   - Diuril 500mg IV x2, hypertonic 3%   x1, Diamox IV 500mg x1 on 7/3  - Nipride drip d/c'd   - Swapnil ; CVP 25, CO 4, CI 2,   - bumex drip currently 1mg/hr (due to sbp 70's-80's around 11 pm)  - CVP 16,  7.5mcg/kg/min and bumex 1mg/hr.  - strict I/o: NET: -1.8L      # coronary artery disease   - [facility] on 2022 showed RCA  and significant LAD and diagonal lesions-but was not revascularized due to non viable myocardium  - c/w Aspirin and Plavix   -resume home rosuvastatin 20mg daily (pt c/o intolerance to atorvastatin)    #SVT  - SVT at 130 in ED  - likely in setting of cardiogenic shock  -pt received amio bolus and drip, then BP has normalized, no further episodes of SVT.   -Amio d/c on        GI  #Transaminitis   - iso of VOL  - abdomen large  - DASH diet now pt comfortable off NIV  -cont protonix for GI ppx  -poor appetite  -nutrition consult        # BRODY on CKD iso cardiorenal  - Baseline creatinine: 2  -worsening crn likely from cardiogenic shock   - inc bumex drip and stat dose of diuril for oilguria and volume overload on   - worsening S creat, today  3  - monitor UO   -avoid nephrotoxins  - pt does not want HD if needed      Heme:  -cbc stable   -HSQ for DVT ppx      Endo:   -A1c 6.2  -TSH on  wnl   -maintain gluc<180 on bmp    LIne:   RIJ cordis on    Left arm double lumen PIcc line with no blood return->  placed on  at [facility]    Left upper extremity PICC with tip in the brachiocephalic vein on Cxr        #Ethics:  -Overnight, wife expressed wishing for home hospice care option, then this morning, she wants to hold off.   -Palliative team suggests scheduling hospice information session for the wife. , Rajendra on board.  prophylaxis (GI and DVT)    LINES:   CCU Service  Cardiology   Spect: [phone] ([facility])     PATIENT: ELYSE MALAVE, MRN: [id number]    79 year old male known to the [facility] HF clinic (Followed by Dr. Shell), with PMH of CAD with STEMI  with cardiogenic shock and IABP placement,  but was not revascularized due to non viable myocardium, ICM (TTE 24 LVEF 20%, RV systolic dysfunction, moderate MR/TR), LV thrombus, prior lung adenocarcinoma with RUL VATS in 2022 s/p radiation therapy comes in with complaints of SOB, LE edema and hypotension. Pt started on bipap, swan placed, pt started on inotropes     transferred to CCU for inotropic support    INTERVAL HISTORY/OVERNIGHT EVENTS: Reports he slept well. Comfortable on 2 L NC. Denies cp, palp, sob    TELEMETRY: Sinus rhythm, PVCs nsvt 3-4 beats          ALLERGIES: Allergies    No Known Allergies    Intolerances    atorvastatin (Muscle Pain (Severe))      MEDICATIONS:  MEDICATIONS  (STANDING):  aspirin enteric coated 81 milliGRAM(s) Oral daily  buMETAnide Infusion 1 mG/Hr (5 mL/Hr) IV Continuous <Continuous>  chlorhexidine 2% Cloths 1 Application(s) Topical <User Schedule>  clopidogrel Tablet 75 milliGRAM(s) Oral daily  DOBUTamine Infusion 7.5 MICROgram(s)/kG/Min (19.2 mL/Hr) IV Continuous <Continuous>  FLUoxetine 20 milliGRAM(s) Oral <User Schedule>  heparin   Injectable 5000 Unit(s) SubCutaneous every 12 hours  hydrALAZINE 10 milliGRAM(s) Oral every 8 hours  latanoprost 0.005% Ophthalmic Solution 1 Drop(s) Both EYES at bedtime  lidocaine 4% Topical Solution 1 Application(s) Topical daily  LORazepam     Tablet 0.25 milliGRAM(s) Oral at bedtime  melatonin 3 milliGRAM(s) Oral at bedtime  mirtazapine 15 milliGRAM(s) Oral at bedtime  pantoprazole    Tablet 40 milliGRAM(s) Oral before breakfast  rosuvastatin 20 milliGRAM(s) Oral at bedtime  tamsulosin 0.8 milliGRAM(s) Oral at bedtime    MEDICATIONS  (PRN):  acetaminophen     Tablet .. 325 milliGRAM(s) Oral every 4 hours PRN Moderate Pain (4 - 6)  lidocaine   4% Patch 1 Patch Transdermal every 24 hours PRN pain  LORazepam     Tablet 0.25 milliGRAM(s) Oral at bedtime PRN Anxiety  sodium chloride 0.9% lock flush 10 milliLiter(s) IV Push every 1 hour PRN Pre/post blood products, medications, blood draw, and to maintain line patency        OBJECTIVE:  ICU Vital Signs Last 24 Hrs  T(C): 35.6 (2024 00:00), Max: 36.4 (2024 07:21)  T(F): 96 (2024 00:00), Max: 97.5 (2024 07:21)  HR: 77 (2024 05:00) (74 - 86)  BP: --  BP(mean): --  ABP: 90/46 (2024 05:00) (80/46 - 105/50)  ABP(mean): 61 (2024 05:00) (52 - 68)  RR: 18 (:) (10 - 32)  SpO2: 96% (:00) (96% - 100%)    O2 Parameters below as of 2024 05:00  Patient On (Oxygen Delivery Method): nasal cannula            I&O's Summary    2024 07:01  -  2024 07:00  --------------------------------------------------------  IN: 1624 mL / OUT: 3430 mL / NET: -1806 mL    2024 07:01  -  2024 06:16  --------------------------------------------------------  IN: 1558.2 mL / OUT: 2415 mL / NET: -856.8 mL      Daily     Daily       PHYSICAL EXAMINATION:  General: Comfortable, no acute distress, cooperative with exam.  HEENT: Moist mucous membranes.  Respiratory: CTAB, normal respiratory effort, no coughing, wheezes, crackles, or rales.  CV: RRR, S1S2, no murmurs, rubs or gallops. No JVD. Distal pulses intact.  Abdominal: Soft, nontender, nondistended, no rebound or guarding, normal bowel sounds.  Neurology: AOx3, no focal neuro defects, GARCIA x 4.  Extremities: No pitting edema, + Peripheral pulses.          LABS:  ABG - ( 2024 04:20 )  pH, Arterial: 7.49  pH, Blood: x     /  pCO2: 30    /  pO2: 125   / HCO3: 23    / Base Excess: 0.1   /  SaO2: 98.5                                    9.6    11.31 )-----------( 93       ( 2024 04:20 )             26.6     07-    132<L>  |  91<L>  |  100<H>  ----------------------------<  137<H>  3.1<L>   |  22  |  3.42<H>    Ca    7.3<L>      2024 15:15  Phos  5.0     07-  Mg     2.10     -    TPro  6.0  /  Alb  2.9<L>  /  TBili  3.3<H>  /  DBili  x   /  AST  106<H>  /  ALT  59<H>  /  AlkPhos  353<H>      LIVER FUNCTIONS - ( 2024 15:15 )  Alb: 2.9 g/dL / Pro: 6.0 g/dL / ALK PHOS: 353 U/L / ALT: 59 U/L / AST: 106 U/L / GGT: x                   Urinalysis Basic - ( 2024 15:15 )    Color: x / Appearance: x / SG: x / pH: x  Gluc: 137 mg/dL / Ketone: x  / Bili: x / Urobili: x   Blood: x / Protein: x / Nitrite: x   Leuk Esterase: x / RBC: x / WBC x   Sq Epi: x / Non Sq Epi: x / Bacteria: x        Assessment and Plan:   · Assessment	  79 year old male known to the [facility] HF clinic (Followed by Dr. Shell), with PMH of CAD with STEMI  with cardiogenic shock and IABP placement,  but was not revascularized due to non viable myocardium, ICM (TTE 24 LVEF 20%, RV systolic dysfunction, moderate MR/TR), LV thrombus, prior lung adenocarcinoma with RUL VATS in 2022 s/p radiation therapy comes in with complaints of SOB, LE edema and hypotension. Pt started on bipap, swan placed, pt started on ionotropes and admitted to CCU for management     Neuro  # bipolar disorder/depression/anxiety   - AxOX3  - intermittent lethargy likely from shock state > mental status improved Rass 0 w/no lethargy   - cont home psych meds : FLUoxetine, mirtazapine   - started on ativan Po as needed for anxiety    Resp  #Hypoxic Resp Failure  - likely from pulm edema i/s of cardiogenic shock  -weaned from NIV to 4LNC  > currently on 2L NC  - now breathing comfortably. titrate for sat>92%  - ABG today: pH, Arterial:  pH 7.49  Blood: x     /  pCO2: 30    /  pO2: 121   / HCO3: 23    / Base Excess: 0.1  /  SaO2: 98.0        # history of lung cancer s/p RUL lobectomy (2022) with recurrent disease s/p radiation to left lung (completed 3/2023),   -Stage IV lung carcinoma, s/p local RT, never on systemic chemotherapy        Cardiac  #  chronic systolic congestive heart failure with cardiogenic shock   - TTE  EF 20% with RV systolic dysfx and moderate MR  -  AM numbers: CVP 26, CO3.1, CI 1.5, SVR 1282, SVO2 42% this AM  - lactate rise to 9 on   -  increased to 7.5 and bumex drip increased to 2mg/hr and given diuril for oliguria on   -  decrease  back to 5/hr and start Nitroprusside for afterload reduction on   - dobutamine increase back to 7.5mg for elevated lactate  -  started on hydralazine  - lactate 1.9 -->2.3  - VOL and Low flow status on exam  - Swapnil from Central venous sat 7/3  :CVP 25 CO 4 CI 1.9  CVO2 52.4 Lact 2.5 on  5/Bumex 2,   - Diuril 500mg IV x2, hypertonic 3%   x1, Diamox IV 500mg x1 on 7/3  - Nipride drip d/c'd   - Swapnil ; CVP 25, CO 4, CI 2,   - bumex drip currently 1mg/hr (due to sbp 70's-80's around 11 pm)  - CVP 16,  7.5mcg/kg/min and bumex 1mg/hr.  - strict I/o: NET: -1.8L      # coronary artery disease   - [facility] on 2022 showed RCA  and significant LAD and diagonal lesions-but was not revascularized due to non viable myocardium  - c/w Aspirin and Plavix   -resume home rosuvastatin 20mg daily (pt c/o intolerance to atorvastatin)    #SVT  - SVT at 130 in ED  - likely in setting of cardiogenic shock  -pt received amio bolus and drip, then BP has normalized, no further episodes of SVT.   -Amio d/c on        GI  #Transaminitis   - iso of VOL  - abdomen large  - DASH diet now pt comfortable off NIV  -cont protonix for GI ppx  -poor appetite  -nutrition consult        # BRODY on CKD iso cardiorenal  - Baseline creatinine: 2  -worsening crn likely from cardiogenic shock   - inc bumex drip and stat dose of diuril for oilguria and volume overload on   - worsening S creat, today  3  - monitor UO   -avoid nephrotoxins  - pt does not want HD if needed      Heme:  -cbc stable   -HSQ for DVT ppx      Endo:   -A1c 6.2  -TSH on  wnl   -maintain gluc<180 on bmp    LIne:   RIJ cordis on    Left arm double lumen PIcc line with no blood return->  placed on  at [facility]    Left upper extremity PICC with tip in the brachiocephalic vein on Cxr        #Ethics:  -Overnight, wife expressed wishing for home hospice care option, then this morning, she wants to hold off.   -Palliative team suggests scheduling hospice information session for the wife. , Rajendra on board.  prophylaxis (GI and DVT)    LINES:   CCU Service  Cardiology   Spect: [phone] ([facility])     PATIENT: ELYSE MALAVE, MRN: [id number]    79 year old male known to the [facility] HF clinic (Followed by Dr. Shell), with PMH of CAD with STEMI  with cardiogenic shock and IABP placement,  but was not revascularized due to non viable myocardium, ICM (TTE 24 LVEF 20%, RV systolic dysfunction, moderate MR/TR), LV thrombus, prior lung adenocarcinoma with RUL VATS in 2022 s/p radiation therapy comes in with complaints of SOB, LE edema and hypotension. Pt started on bipap, swan placed, pt started on inotropes     transferred to CCU for inotropic support    INTERVAL HISTORY/OVERNIGHT EVENTS: Reports he slept well. Comfortable on 2 L NC. Denies cp, palp, sob  bumex 1mg gtt    TELEMETRY: Sinus rhythm, PVCs nsvt 3-4 beats          ALLERGIES: Allergies    No Known Allergies    Intolerances    atorvastatin (Muscle Pain (Severe))      MEDICATIONS:  MEDICATIONS  (STANDING):  aspirin enteric coated 81 milliGRAM(s) Oral daily  buMETAnide Infusion 1 mG/Hr (5 mL/Hr) IV Continuous <Continuous>  chlorhexidine 2% Cloths 1 Application(s) Topical <User Schedule>  clopidogrel Tablet 75 milliGRAM(s) Oral daily  DOBUTamine Infusion 7.5 MICROgram(s)/kG/Min (19.2 mL/Hr) IV Continuous <Continuous>  FLUoxetine 20 milliGRAM(s) Oral <User Schedule>  heparin   Injectable 5000 Unit(s) SubCutaneous every 12 hours  hydrALAZINE 10 milliGRAM(s) Oral every 8 hours  latanoprost 0.005% Ophthalmic Solution 1 Drop(s) Both EYES at bedtime  lidocaine 4% Topical Solution 1 Application(s) Topical daily  LORazepam     Tablet 0.25 milliGRAM(s) Oral at bedtime  melatonin 3 milliGRAM(s) Oral at bedtime  mirtazapine 15 milliGRAM(s) Oral at bedtime  pantoprazole    Tablet 40 milliGRAM(s) Oral before breakfast  rosuvastatin 20 milliGRAM(s) Oral at bedtime  tamsulosin 0.8 milliGRAM(s) Oral at bedtime    MEDICATIONS  (PRN):  acetaminophen     Tablet .. 325 milliGRAM(s) Oral every 4 hours PRN Moderate Pain (4 - 6)  lidocaine   4% Patch 1 Patch Transdermal every 24 hours PRN pain  LORazepam     Tablet 0.25 milliGRAM(s) Oral at bedtime PRN Anxiety  sodium chloride 0.9% lock flush 10 milliLiter(s) IV Push every 1 hour PRN Pre/post blood products, medications, blood draw, and to maintain line patency        OBJECTIVE:  ICU Vital Signs Last 24 Hrs  T(C): 35.6 (2024 00:00), Max: 36.4 (2024 07:21)  T(F): 96 (2024 00:00), Max: 97.5 (2024 07:21)  HR: 77 (2024 05:00) (74 - 86)  BP: --  BP(mean): --  ABP: 90/46 (2024 05:00) (80/46 - 105/50)  ABP(mean): 61 (2024 05:00) (52 - 68)  RR: 18 (:00) (10 - 32)  SpO2: 96% (2024 05:00) (96% - 100%)    O2 Parameters below as of 2024 05:00  Patient On (Oxygen Delivery Method): nasal cannula            I&O's Summary    2024 07:01  -  2024 07:00  --------------------------------------------------------  IN: 1624 mL / OUT: 3430 mL / NET: -1806 mL    2024 07:01  -  2024 06:16  --------------------------------------------------------  IN: 1558.2 mL / OUT: 2415 mL / NET: -856.8 mL      Daily     Daily       PHYSICAL EXAMINATION:  General: Comfortable, no acute distress, cooperative with exam.  HEENT: Moist mucous membranes.  Respiratory: CTAB, normal respiratory effort, no coughing, wheezes, crackles, or rales.  CV: RRR, S1S2, no murmurs, rubs or gallops. No JVD. Distal pulses intact.  Abdominal: Soft, nontender, nondistended, no rebound or guarding, normal bowel sounds.  Neurology: AOx3, no focal neuro defects, GARCIA x 4.  Extremities: No pitting edema, + Peripheral pulses.          LABS:  ABG - ( 2024 04:20 )  pH, Arterial: 7.49  pH, Blood: x     /  pCO2: 30    /  pO2: 125   / HCO3: 23    / Base Excess: 0.1   /  SaO2: 98.5                                    9.6    11.31 )-----------( 93       ( 2024 04:20 )             26.6     07-    132<L>  |  91<L>  |  100<H>  ----------------------------<  137<H>  3.1<L>   |  22  |  3.42<H>    Ca    7.3<L>      2024 15:15  Phos  5.0     07-  Mg     2.10     07-    TPro  6.0  /  Alb  2.9<L>  /  TBili  3.3<H>  /  DBili  x   /  AST  106<H>  /  ALT  59<H>  /  AlkPhos  353<H>  -    LIVER FUNCTIONS - ( 2024 15:15 )  Alb: 2.9 g/dL / Pro: 6.0 g/dL / ALK PHOS: 353 U/L / ALT: 59 U/L / AST: 106 U/L / GGT: x                   Urinalysis Basic - ( 2024 15:15 )    Color: x / Appearance: x / SG: x / pH: x  Gluc: 137 mg/dL / Ketone: x  / Bili: x / Urobili: x   Blood: x / Protein: x / Nitrite: x   Leuk Esterase: x / RBC: x / WBC x   Sq Epi: x / Non Sq Epi: x / Bacteria: x        Assessment and Plan:   · Assessment	  79 year old male known to the [facility] HF clinic (Followed by Dr. Shell), with PMH of CAD with STEMI  with cardiogenic shock and IABP placement,  but was not revascularized due to non viable myocardium, ICM (TTE 24 LVEF 20%, RV systolic dysfunction, moderate MR/TR), LV thrombus, prior lung adenocarcinoma with RUL VATS in 2022 s/p radiation therapy comes in with complaints of SOB, LE edema and hypotension. Pt started on bipap, swan placed, pt started on ionotropes and admitted to CCU for management     Neuro  # bipolar disorder/depression/anxiety   - AxOX3  - intermittent lethargy likely from shock state > mental status improved Rass 0 w/no lethargy   - cont home psych meds : FLUoxetine, mirtazapine   - started on ativan Po as needed for anxiety    Resp  #Hypoxic Resp Failure  - likely from pulm edema i/s of cardiogenic shock  -weaned from NIV to 4LNC  > currently on 2L NC  - now breathing comfortably. titrate for sat>92%      # history of lung cancer s/p RUL lobectomy (2022) with recurrent disease s/p radiation to left lung (completed 3/2023),   -Stage IV lung carcinoma, s/p local RT, never on systemic chemotherapy        Cardiac  #  chronic systolic congestive heart failure with cardiogenic shock   - TTE  EF 20% with RV systolic dysfx and moderate MR  -  AM numbers: CVP 26, CO3.1, CI 1.5, SVR 1282, SVO2 42% this AM  - lactate rise to 9 on   -  increased to 7.5 and bumex drip increased to 2mg/hr and given diuril for oliguria on   -  decrease  back to 5/hr and start Nitroprusside for afterload reduction on   - dobutamine increase back to 7.5mg for elevated lactate  -  started on hydralazine  - lactate 1.9 -->2.3  - VOL and Low flow status on exam  - Swapnil from Central venous sat 7/3  :CVP 25 CO 4 CI 1.9  CVO2 52.4 Lact 2.5 on  5/Bumex 2,   - Diuril 500mg IV x2, hypertonic 3%   x1, Diamox IV 500mg x1 on 7/3  - Nipride drip d/c'd   - Swapnil ; CVP 25, CO 4, CI 2,   - bumex drip currently 1mg/hr (due to sbp 70's-80's around 11 pm)  - CVP 18,  7.5mcg/kg/min and bumex 1mg/hr.        # coronary artery disease   - [facility] on 2022 showed RCA  and significant LAD and diagonal lesions-but was not revascularized due to non viable myocardium  - c/w Aspirin and Plavix   -resume home rosuvastatin 20mg daily (pt c/o intolerance to atorvastatin)    #SVT  - SVT at 130 in ED  - likely in setting of cardiogenic shock  -pt received amio bolus and drip, then BP has normalized, no further episodes of SVT.   -Amio d/c on        GI  #Transaminitis   - iso of VOL - now improving  - abdomen large  - DASH diet now pt comfortable off NIV  -cont protonix for GI ppx  -poor appetite  -nutrition consult        # BRODY on CKD iso cardiorenal  - Baseline creatinine: 2  -worsening crn likely from cardiogenic shock   - inc bumex drip and stat dose of diuril for oilguria and volume overload on   - worsening S creat, today  3  - monitor UO   -avoid nephrotoxins  - pt does not want HD if needed  -  creatinine downtrending 3.42 --> 3.28  Chandler with hematuria H&H stable      Heme:  -cbc stable   -HSQ for DVT ppx      Endo:   -A1c 6.2  -TSH on  wnl   -maintain gluc<180 on bmp    LIne:   RIJ cordis on    Left arm double lumen PIcc line with no blood return->  placed on  at [facility]    Left upper extremity PICC with tip in the brachiocephalic vein on Cxr        #Ethics:  -Overnight, wife expressed wishing for home hospice care option, then this morning, she wants to hold off.   -Palliative team suggests scheduling hospice information session for the wife. , Rajendra on board.     CCU Service  Cardiology   Spect: [id number] ([facility])     PATIENT: ELYSE MALAVE, MRN: [id number]    79 year old male known to the [facility] HF clinic (Followed by Dr. Shell), with PMH of CAD with STEMI  with cardiogenic shock and IABP placement,  but was not revascularized due to non viable myocardium, ICM (TTE 24 LVEF 20%, RV systolic dysfunction, moderate MR/TR), LV thrombus, prior lung adenocarcinoma with RUL VATS in 2022 s/p radiation therapy comes in with complaints of SOB, LE edema and hypotension. Pt started on bipap, swan placed, pt started on inotropes     transferred to CCU for inotropic support    INTERVAL HISTORY/OVERNIGHT EVENTS: Reports he slept well. Comfortable on 2 L NC. Denies cp, palp, sob  bumex 1mg gtt    TELEMETRY: Sinus rhythm, PVCs nsvt 3-4 beats          ALLERGIES: Allergies    No Known Allergies    Intolerances    atorvastatin (Muscle Pain (Severe))      MEDICATIONS:  MEDICATIONS  (STANDING):  aspirin enteric coated 81 milliGRAM(s) Oral daily  buMETAnide Infusion 1 mG/Hr (5 mL/Hr) IV Continuous <Continuous>  chlorhexidine 2% Cloths 1 Application(s) Topical <User Schedule>  clopidogrel Tablet 75 milliGRAM(s) Oral daily  DOBUTamine Infusion 7.5 MICROgram(s)/kG/Min (19.2 mL/Hr) IV Continuous <Continuous>  FLUoxetine 20 milliGRAM(s) Oral <User Schedule>  heparin   Injectable 5000 Unit(s) SubCutaneous every 12 hours  hydrALAZINE 10 milliGRAM(s) Oral every 8 hours  latanoprost 0.005% Ophthalmic Solution 1 Drop(s) Both EYES at bedtime  lidocaine 4% Topical Solution 1 Application(s) Topical daily  LORazepam     Tablet 0.25 milliGRAM(s) Oral at bedtime  melatonin 3 milliGRAM(s) Oral at bedtime  mirtazapine 15 milliGRAM(s) Oral at bedtime  pantoprazole    Tablet 40 milliGRAM(s) Oral before breakfast  rosuvastatin 20 milliGRAM(s) Oral at bedtime  tamsulosin 0.8 milliGRAM(s) Oral at bedtime    MEDICATIONS  (PRN):  acetaminophen     Tablet .. 325 milliGRAM(s) Oral every 4 hours PRN Moderate Pain (4 - 6)  lidocaine   4% Patch 1 Patch Transdermal every 24 hours PRN pain  LORazepam     Tablet 0.25 milliGRAM(s) Oral at bedtime PRN Anxiety  sodium chloride 0.9% lock flush 10 milliLiter(s) IV Push every 1 hour PRN Pre/post blood products, medications, blood draw, and to maintain line patency        OBJECTIVE:  ICU Vital Signs Last 24 Hrs  T(C): 35.6 (2024 00:00), Max: 36.4 (2024 07:21)  T(F): 96 (2024 00:00), Max: 97.5 (2024 07:21)  HR: 77 (2024 05:00) (74 - 86)  BP: --  BP(mean): --  ABP: 90/46 (2024 05:00) (80/46 - 105/50)  ABP(mean): 61 (2024 05:00) (52 - 68)  RR: 18 (:00) (10 - 32)  SpO2: 96% (2024 05:00) (96% - 100%)    O2 Parameters below as of 2024 05:00  Patient On (Oxygen Delivery Method): nasal cannula            I&O's Summary    2024 07:01  -  2024 07:00  --------------------------------------------------------  IN: 1624 mL / OUT: 3430 mL / NET: -1806 mL    2024 07:01  -  2024 06:16  --------------------------------------------------------  IN: 1558.2 mL / OUT: 2415 mL / NET: -856.8 mL      Daily     Daily       PHYSICAL EXAMINATION:  General: Comfortable, no acute distress, cooperative with exam.  HEENT: Moist mucous membranes.  Respiratory: CTAB, normal respiratory effort, no coughing, wheezes, crackles, or rales.  CV: RRR, S1S2, no murmurs, rubs or gallops. No JVD. Distal pulses intact.  Abdominal: Soft, nontender, nondistended, no rebound or guarding, normal bowel sounds.  Neurology: AOx3, no focal neuro defects, GARCIA x 4.  Extremities: No pitting edema, + Peripheral pulses.          LABS:  ABG - ( 2024 04:20 )  pH, Arterial: 7.49  pH, Blood: x     /  pCO2: 30    /  pO2: 125   / HCO3: 23    / Base Excess: 0.1   /  SaO2: 98.5                                    9.6    11.31 )-----------( 93       ( 2024 04:20 )             26.6     07-    132<L>  |  91<L>  |  100<H>  ----------------------------<  137<H>  3.1<L>   |  22  |  3.42<H>    Ca    7.3<L>      2024 15:15  Phos  5.0     07-  Mg     2.10     07-    TPro  6.0  /  Alb  2.9<L>  /  TBili  3.3<H>  /  DBili  x   /  AST  106<H>  /  ALT  59<H>  /  AlkPhos  353<H>  -    LIVER FUNCTIONS - ( 2024 15:15 )  Alb: 2.9 g/dL / Pro: 6.0 g/dL / ALK PHOS: 353 U/L / ALT: 59 U/L / AST: 106 U/L / GGT: x                   Urinalysis Basic - ( 2024 15:15 )    Color: x / Appearance: x / SG: x / pH: x  Gluc: 137 mg/dL / Ketone: x  / Bili: x / Urobili: x   Blood: x / Protein: x / Nitrite: x   Leuk Esterase: x / RBC: x / WBC x   Sq Epi: x / Non Sq Epi: x / Bacteria: x        Assessment and Plan:   · Assessment	  79 year old male known to the [facility] HF clinic (Followed by Dr. Shell), with PMH of CAD with STEMI  with cardiogenic shock and IABP placement,  but was not revascularized due to non viable myocardium, ICM (TTE 24 LVEF 20%, RV systolic dysfunction, moderate MR/TR), LV thrombus, prior lung adenocarcinoma with RUL VATS in 2022 s/p radiation therapy comes in with complaints of SOB, LE edema and hypotension. Pt started on bipap, swan placed, pt started on ionotropes and admitted to CCU for management     Neuro  # bipolar disorder/depression/anxiety   - AxOX3  - intermittent lethargy likely from shock state > mental status improved Rass 0 w/no lethargy   - cont home psych meds : FLUoxetine, mirtazapine   - started on ativan Po as needed for anxiety    Resp  #Hypoxic Resp Failure  - likely from pulm edema i/s of cardiogenic shock  -weaned from NIV to 4LNC  > currently on 2L NC  - now breathing comfortably. titrate for sat>92%      # history of lung cancer s/p RUL lobectomy (2022) with recurrent disease s/p radiation to left lung (completed 3/2023),   -Stage IV lung carcinoma, s/p local RT, never on systemic chemotherapy        Cardiac  #  chronic systolic congestive heart failure with cardiogenic shock   - TTE  EF 20% with RV systolic dysfx and moderate MR  -  AM numbers: CVP 26, CO3.1, CI 1.5, SVR 1282, SVO2 42% this AM  - lactate rise to 9 on   -  increased to 7.5 and bumex drip increased to 2mg/hr and given diuril for oliguria on   -  decrease  back to 5/hr and start Nitroprusside for afterload reduction on   - dobutamine increase back to 7.5mg for elevated lactate  -  started on hydralazine  - lactate 1.9 -->2.3  - VOL and Low flow status on exam  - Swapnil from Central venous sat 7/3  :CVP 25 CO 4 CI 1.9  CVO2 52.4 Lact 2.5 on  5/Bumex 2,   - Diuril 500mg IV x2, hypertonic 3%   x1, Diamox IV 500mg x1 on 7/3  - Nipride drip d/c'd   - Swapnil ; CVP 25, CO 4, CI 2,   - bumex drip currently 1mg/hr (due to sbp 70's-80's around 11 pm)  - CVP 18,  7.5mcg/kg/min and bumex 1mg/hr.        # coronary artery disease   - [facility] on 2022 showed RCA  and significant LAD and diagonal lesions-but was not revascularized due to non viable myocardium  - c/w Aspirin and Plavix   -resume home rosuvastatin 20mg daily (pt c/o intolerance to atorvastatin)    #SVT  - SVT at 130 in ED  - likely in setting of cardiogenic shock  -pt received amio bolus and drip, then BP has normalized, no further episodes of SVT.   -Amio d/c on        GI  #Transaminitis   - iso of VOL - now improving  - abdomen large  - DASH diet now pt comfortable off NIV  -cont protonix for GI ppx  -poor appetite  -nutrition consult        # BRODY on CKD iso cardiorenal  - Baseline creatinine: 2  -worsening crn likely from cardiogenic shock   - inc bumex drip and stat dose of diuril for oilguria and volume overload on   - worsening S creat, today  3  - monitor UO   -avoid nephrotoxins  - pt does not want HD if needed  -  creatinine downtrending 3.42 --> 3.28  Gerardo with hematuria H&H stable.  Asymptomatic.   Discussed with urology with plan to discontinue gerardo, trial of void and send UA to r/o infection v prostate trauma.      Heme:  -cbc stable   -HSQ for DVT ppx      Endo:   -A1c 6.2  -TSH on  wnl   -maintain gluc<180 on bmp    LIne:   JENNIE cordis on    Left arm double lumen PIcc line with no blood return->  placed on  at [facility]    Left upper extremity PICC with tip in the brachiocephalic vein on Cxr        #Ethics:  -Overnight, wife expressed wishing for home hospice care option, then this morning, she wants to hold off.   -Palliative team suggests scheduling hospice information session for the wife. , Rajendra on board.

## 2024-07-07 NOTE — DIETITIAN INITIAL EVALUATION ADULT - PERTINENT LABORATORY DATA
07-07    133<L>  |  91<L>  |  90<H>  ----------------------------<  109<H>  3.2<L>   |  23  |  3.28<H>    Ca    7.6<L>      07 Jul 2024 06:21  Phos  5.3     07-07  Mg     2.10     07-07    TPro  5.8<L>  /  Alb  2.9<L>  /  TBili  3.4<H>  /  DBili  x   /  AST  98<H>  /  ALT  55<H>  /  AlkPhos  342<H>  07-07

## 2024-07-08 ENCOUNTER — APPOINTMENT (OUTPATIENT)
Dept: HEART FAILURE | Facility: CLINIC | Age: 79
End: 2024-07-08

## 2024-07-08 LAB
ALBUMIN SERPL ELPH-MCNC: 2.7 G/DL — LOW (ref 3.3–5)
ALBUMIN SERPL ELPH-MCNC: 2.8 G/DL — LOW (ref 3.3–5)
ALBUMIN SERPL ELPH-MCNC: 2.8 G/DL — LOW (ref 3.3–5)
ALP SERPL-CCNC: 369 U/L — HIGH (ref 40–120)
ALP SERPL-CCNC: 373 U/L — HIGH (ref 40–120)
ALP SERPL-CCNC: 374 U/L — HIGH (ref 40–120)
ALT FLD-CCNC: 48 U/L — HIGH (ref 4–41)
ALT FLD-CCNC: 48 U/L — HIGH (ref 4–41)
ALT FLD-CCNC: 49 U/L — HIGH (ref 4–41)
ANION GAP SERPL CALC-SCNC: 16 MMOL/L — HIGH (ref 7–14)
ANION GAP SERPL CALC-SCNC: 17 MMOL/L — HIGH (ref 7–14)
ANION GAP SERPL CALC-SCNC: 18 MMOL/L — HIGH (ref 7–14)
APPEARANCE UR: ABNORMAL
AST SERPL-CCNC: 78 U/L — HIGH (ref 4–40)
AST SERPL-CCNC: 81 U/L — HIGH (ref 4–40)
AST SERPL-CCNC: 82 U/L — HIGH (ref 4–40)
BACTERIA # UR AUTO: ABNORMAL /HPF
BILIRUB SERPL-MCNC: 3.7 MG/DL — HIGH (ref 0.2–1.2)
BILIRUB SERPL-MCNC: 3.7 MG/DL — HIGH (ref 0.2–1.2)
BILIRUB SERPL-MCNC: 3.8 MG/DL — HIGH (ref 0.2–1.2)
BILIRUB UR-MCNC: NEGATIVE — SIGNIFICANT CHANGE UP
BLOOD GAS ARTERIAL COMPREHENSIVE RESULT: SIGNIFICANT CHANGE UP
BUN SERPL-MCNC: 76 MG/DL — HIGH (ref 7–23)
BUN SERPL-MCNC: 78 MG/DL — HIGH (ref 7–23)
BUN SERPL-MCNC: 83 MG/DL — HIGH (ref 7–23)
CALCIUM SERPL-MCNC: 6.8 MG/DL — LOW (ref 8.4–10.5)
CALCIUM SERPL-MCNC: 7.6 MG/DL — LOW (ref 8.4–10.5)
CALCIUM SERPL-MCNC: 7.7 MG/DL — LOW (ref 8.4–10.5)
CAST: 2 /LPF — SIGNIFICANT CHANGE UP (ref 0–4)
CHLORIDE SERPL-SCNC: 94 MMOL/L — LOW (ref 98–107)
CHLORIDE SERPL-SCNC: 95 MMOL/L — LOW (ref 98–107)
CHLORIDE SERPL-SCNC: 97 MMOL/L — LOW (ref 98–107)
CO2 SERPL-SCNC: 23 MMOL/L — SIGNIFICANT CHANGE UP (ref 22–31)
COLOR SPEC: YELLOW — SIGNIFICANT CHANGE UP
CREAT SERPL-MCNC: 2.8 MG/DL — HIGH (ref 0.5–1.3)
CREAT SERPL-MCNC: 2.88 MG/DL — HIGH (ref 0.5–1.3)
CREAT SERPL-MCNC: 2.94 MG/DL — HIGH (ref 0.5–1.3)
DIFF PNL FLD: ABNORMAL
EGFR: 21 ML/MIN/1.73M2 — LOW
EGFR: 22 ML/MIN/1.73M2 — LOW
EGFR: 22 ML/MIN/1.73M2 — LOW
GLUCOSE SERPL-MCNC: 100 MG/DL — HIGH (ref 70–99)
GLUCOSE SERPL-MCNC: 111 MG/DL — HIGH (ref 70–99)
GLUCOSE SERPL-MCNC: 182 MG/DL — HIGH (ref 70–99)
GLUCOSE UR QL: NEGATIVE MG/DL — SIGNIFICANT CHANGE UP
HCT VFR BLD CALC: 27.8 % — LOW (ref 39–50)
HGB BLD-MCNC: 9.6 G/DL — LOW (ref 13–17)
KETONES UR-MCNC: NEGATIVE MG/DL — SIGNIFICANT CHANGE UP
LEUKOCYTE ESTERASE UR-ACNC: ABNORMAL
MAGNESIUM SERPL-MCNC: 1.9 MG/DL — SIGNIFICANT CHANGE UP (ref 1.6–2.6)
MAGNESIUM SERPL-MCNC: 2.1 MG/DL — SIGNIFICANT CHANGE UP (ref 1.6–2.6)
MAGNESIUM SERPL-MCNC: 2.5 MG/DL — SIGNIFICANT CHANGE UP (ref 1.6–2.6)
MCHC RBC-ENTMCNC: 29.5 PG — SIGNIFICANT CHANGE UP (ref 27–34)
MCHC RBC-ENTMCNC: 34.5 GM/DL — SIGNIFICANT CHANGE UP (ref 32–36)
MCV RBC AUTO: 85.5 FL — SIGNIFICANT CHANGE UP (ref 80–100)
NITRITE UR-MCNC: NEGATIVE — SIGNIFICANT CHANGE UP
NRBC # BLD: 0 /100 WBCS — SIGNIFICANT CHANGE UP (ref 0–0)
NRBC # FLD: 0 K/UL — SIGNIFICANT CHANGE UP (ref 0–0)
PH UR: 6 — SIGNIFICANT CHANGE UP (ref 5–8)
PHOSPHATE SERPL-MCNC: 3.8 MG/DL — SIGNIFICANT CHANGE UP (ref 2.5–4.5)
PHOSPHATE SERPL-MCNC: 4.1 MG/DL — SIGNIFICANT CHANGE UP (ref 2.5–4.5)
PHOSPHATE SERPL-MCNC: 4.3 MG/DL — SIGNIFICANT CHANGE UP (ref 2.5–4.5)
PLATELET # BLD AUTO: 94 K/UL — LOW (ref 150–400)
POTASSIUM SERPL-MCNC: 2.8 MMOL/L — CRITICAL LOW (ref 3.5–5.3)
POTASSIUM SERPL-MCNC: 3.5 MMOL/L — SIGNIFICANT CHANGE UP (ref 3.5–5.3)
POTASSIUM SERPL-MCNC: 4.1 MMOL/L — SIGNIFICANT CHANGE UP (ref 3.5–5.3)
POTASSIUM SERPL-SCNC: 2.8 MMOL/L — CRITICAL LOW (ref 3.5–5.3)
POTASSIUM SERPL-SCNC: 3.5 MMOL/L — SIGNIFICANT CHANGE UP (ref 3.5–5.3)
POTASSIUM SERPL-SCNC: 4.1 MMOL/L — SIGNIFICANT CHANGE UP (ref 3.5–5.3)
PROT SERPL-MCNC: 6.1 G/DL — SIGNIFICANT CHANGE UP (ref 6–8.3)
PROT SERPL-MCNC: 6.3 G/DL — SIGNIFICANT CHANGE UP (ref 6–8.3)
PROT SERPL-MCNC: 6.4 G/DL — SIGNIFICANT CHANGE UP (ref 6–8.3)
PROT UR-MCNC: 30 MG/DL
RBC # BLD: 3.25 M/UL — LOW (ref 4.2–5.8)
RBC # FLD: 17.8 % — HIGH (ref 10.3–14.5)
RBC CASTS # UR COMP ASSIST: 23 /HPF — HIGH (ref 0–4)
SODIUM SERPL-SCNC: 135 MMOL/L — SIGNIFICANT CHANGE UP (ref 135–145)
SODIUM SERPL-SCNC: 135 MMOL/L — SIGNIFICANT CHANGE UP (ref 135–145)
SODIUM SERPL-SCNC: 136 MMOL/L — SIGNIFICANT CHANGE UP (ref 135–145)
SP GR SPEC: 1.01 — SIGNIFICANT CHANGE UP (ref 1–1.03)
SQUAMOUS # UR AUTO: 1 /HPF — SIGNIFICANT CHANGE UP (ref 0–5)
UROBILINOGEN FLD QL: 2 MG/DL (ref 0.2–1)
WBC # BLD: 9.17 K/UL — SIGNIFICANT CHANGE UP (ref 3.8–10.5)
WBC # FLD AUTO: 9.17 K/UL — SIGNIFICANT CHANGE UP (ref 3.8–10.5)
WBC UR QL: 37 /HPF — HIGH (ref 0–5)

## 2024-07-08 PROCEDURE — 99291 CRITICAL CARE FIRST HOUR: CPT | Mod: FS

## 2024-07-08 PROCEDURE — 99233 SBSQ HOSP IP/OBS HIGH 50: CPT

## 2024-07-08 PROCEDURE — 99232 SBSQ HOSP IP/OBS MODERATE 35: CPT | Mod: FS

## 2024-07-08 RX ORDER — SPIRONOLACTONE 25 MG/1
25 TABLET ORAL DAILY
Refills: 0 | Status: DISCONTINUED | OUTPATIENT
Start: 2024-07-08 | End: 2024-07-11

## 2024-07-08 RX ORDER — MAGNESIUM SULFATE 100 %
1 POWDER (GRAM) MISCELLANEOUS ONCE
Refills: 0 | Status: COMPLETED | OUTPATIENT
Start: 2024-07-08 | End: 2024-07-08

## 2024-07-08 RX ORDER — SPIRONOLACTONE 25 MG/1
1 TABLET ORAL
Qty: 0 | Refills: 0 | DISCHARGE
Start: 2024-07-08

## 2024-07-08 RX ORDER — POTASSIUM CHLORIDE 600 MG/1
20 TABLET, FILM COATED, EXTENDED RELEASE ORAL
Refills: 0 | Status: DISCONTINUED | OUTPATIENT
Start: 2024-07-08 | End: 2024-07-09

## 2024-07-08 RX ORDER — TRAMADOL HYDROCHLORIDE 50 MG/1
25 TABLET, FILM COATED ORAL ONCE
Refills: 0 | Status: DISCONTINUED | OUTPATIENT
Start: 2024-07-08 | End: 2024-07-11

## 2024-07-08 RX ORDER — POTASSIUM CHLORIDE 600 MG/1
20 TABLET, FILM COATED, EXTENDED RELEASE ORAL
Refills: 0 | Status: COMPLETED | OUTPATIENT
Start: 2024-07-08 | End: 2024-07-08

## 2024-07-08 RX ORDER — ACETAMINOPHEN 325 MG
1 TABLET ORAL
Qty: 0 | Refills: 0 | DISCHARGE
Start: 2024-07-08

## 2024-07-08 RX ORDER — MAGNESIUM SULFATE 100 %
2 POWDER (GRAM) MISCELLANEOUS ONCE
Refills: 0 | Status: COMPLETED | OUTPATIENT
Start: 2024-07-08 | End: 2024-07-08

## 2024-07-08 RX ORDER — PANTOPRAZOLE SODIUM 40 MG/10ML
1 INJECTION, POWDER, FOR SOLUTION INTRAVENOUS
Qty: 0 | Refills: 0 | DISCHARGE
Start: 2024-07-08

## 2024-07-08 RX ORDER — POTASSIUM CHLORIDE 600 MG/1
40 TABLET, FILM COATED, EXTENDED RELEASE ORAL EVERY 4 HOURS
Refills: 0 | Status: DISCONTINUED | OUTPATIENT
Start: 2024-07-08 | End: 2024-07-08

## 2024-07-08 RX ORDER — BUMETANIDE 0.25 MG/ML
4 INJECTION INTRAMUSCULAR; INTRAVENOUS
Refills: 0 | Status: DISCONTINUED | OUTPATIENT
Start: 2024-07-08 | End: 2024-07-09

## 2024-07-08 RX ADMIN — Medication 19.2 MICROGRAM(S)/KG/MIN: at 08:18

## 2024-07-08 RX ADMIN — Medication 25 GRAM(S): at 11:49

## 2024-07-08 RX ADMIN — Medication 19.2 MICROGRAM(S)/KG/MIN: at 10:23

## 2024-07-08 RX ADMIN — Medication 100 GRAM(S): at 06:03

## 2024-07-08 RX ADMIN — LIDOCAINE HCL 1 PATCH: 28 GEL TOPICAL at 20:18

## 2024-07-08 RX ADMIN — POTASSIUM CHLORIDE 40 MILLIEQUIVALENT(S): 600 TABLET, FILM COATED, EXTENDED RELEASE ORAL at 06:48

## 2024-07-08 RX ADMIN — PANTOPRAZOLE SODIUM 40 MILLIGRAM(S): 40 INJECTION, POWDER, FOR SOLUTION INTRAVENOUS at 08:19

## 2024-07-08 RX ADMIN — TAMSULOSIN HYDROCHLORIDE 0.8 MILLIGRAM(S): 0.4 CAPSULE ORAL at 21:28

## 2024-07-08 RX ADMIN — Medication 3 MILLIGRAM(S): at 21:28

## 2024-07-08 RX ADMIN — HEPARIN SODIUM 5000 UNIT(S): 50 INJECTION, SOLUTION INTRAVENOUS at 17:56

## 2024-07-08 RX ADMIN — Medication 325 MILLIGRAM(S): at 18:29

## 2024-07-08 RX ADMIN — Medication 19.2 MICROGRAM(S)/KG/MIN: at 19:15

## 2024-07-08 RX ADMIN — MIRTAZAPINE 15 MILLIGRAM(S): 15 TABLET, FILM COATED ORAL at 21:29

## 2024-07-08 RX ADMIN — CLOPIDOGREL BISULFATE 75 MILLIGRAM(S): 75 TABLET, FILM COATED ORAL at 11:49

## 2024-07-08 RX ADMIN — POTASSIUM CHLORIDE 40 MILLIEQUIVALENT(S): 600 TABLET, FILM COATED, EXTENDED RELEASE ORAL at 05:53

## 2024-07-08 RX ADMIN — HYDRALAZINE HYDROCHLORIDE 10 MILLIGRAM(S): 50 TABLET ORAL at 05:25

## 2024-07-08 RX ADMIN — POTASSIUM CHLORIDE 50 MILLIEQUIVALENT(S): 600 TABLET, FILM COATED, EXTENDED RELEASE ORAL at 13:23

## 2024-07-08 RX ADMIN — BUMETANIDE 5 MG/HR: 0.25 INJECTION INTRAMUSCULAR; INTRAVENOUS at 10:22

## 2024-07-08 RX ADMIN — Medication 325 MILLIGRAM(S): at 18:00

## 2024-07-08 RX ADMIN — ASPIRIN 81 MILLIGRAM(S): 325 TABLET, FILM COATED ORAL at 11:50

## 2024-07-08 RX ADMIN — ROSUVASTATIN CALCIUM 20 MILLIGRAM(S): 20 TABLET ORAL at 21:27

## 2024-07-08 RX ADMIN — POTASSIUM CHLORIDE 20 MILLIEQUIVALENT(S): 600 TABLET, FILM COATED, EXTENDED RELEASE ORAL at 17:56

## 2024-07-08 RX ADMIN — POTASSIUM CHLORIDE 50 MILLIEQUIVALENT(S): 600 TABLET, FILM COATED, EXTENDED RELEASE ORAL at 10:22

## 2024-07-08 RX ADMIN — HEPARIN SODIUM 5000 UNIT(S): 50 INJECTION, SOLUTION INTRAVENOUS at 05:21

## 2024-07-08 RX ADMIN — LATANOPROST PF 1 DROP(S): 0.05 SOLUTION/ DROPS OPHTHALMIC at 21:29

## 2024-07-08 RX ADMIN — Medication 1 APPLICATION(S): at 05:54

## 2024-07-08 RX ADMIN — BUMETANIDE 132 MILLIGRAM(S): 0.25 INJECTION INTRAMUSCULAR; INTRAVENOUS at 17:56

## 2024-07-08 RX ADMIN — LIDOCAINE HCL 1 PATCH: 28 GEL TOPICAL at 17:55

## 2024-07-08 RX ADMIN — POTASSIUM CHLORIDE 50 MILLIEQUIVALENT(S): 600 TABLET, FILM COATED, EXTENDED RELEASE ORAL at 11:11

## 2024-07-08 RX ADMIN — BUMETANIDE 5 MG/HR: 0.25 INJECTION INTRAMUSCULAR; INTRAVENOUS at 08:18

## 2024-07-08 RX ADMIN — POTASSIUM CHLORIDE 40 MILLIEQUIVALENT(S): 600 TABLET, FILM COATED, EXTENDED RELEASE ORAL at 10:22

## 2024-07-08 RX ADMIN — Medication 20 MILLIGRAM(S): at 05:54

## 2024-07-08 RX ADMIN — Medication 0.25 MILLIGRAM(S): at 21:27

## 2024-07-08 NOTE — PROGRESS NOTE ADULT - ASSESSMENT
79 year old male known to the [facility] HF clinic (Followed by Dr. Shell), with PMH of CAD with STEMI  with cardiogenic shock and IABP placement,  but was not revascularized due to non viable myocardium, ICM (TTE 24 LVEF 20%, RV systolic dysfunction, moderate MR/TR), LV thrombus, prior lung adenocarcinoma with RUL VATS in 2022 s/p radiation therapy comes in with complaints of SOB, LE edema and hypotension. Pt started on bipap, swan placed, pt started on ionotropes and admitted to CCU for management     Neuro  # bipolar disorder/depression/anxiety   - AxOX3  - intermittent lethargy likely from shock state > mental status improved Rass 0 w/no lethargy   - cont home psych meds : FLUoxetine, mirtazapine   - started on ativan Po as needed for anxiety    Resp  #Hypoxic Resp Failure  -remains on nasal cannula    # history of lung cancer s/p RUL lobectomy (2022) with recurrent disease s/p radiation to left lung (completed 3/2023),   -Stage IV lung carcinoma, s/p local RT, never on systemic chemotherapy        Cardiac  #  chronic systolic congestive heart failure with cardiogenic shock   - TTE  EF 20% with RV systolic dysfx and moderate MR  -  AM numbers: CVP 26, CO3.1, CI 1.5, SVR 1282, SVO2 42% this AM  - lactate rise to 9 on   -  increased to 7.5 and bumex drip increased to 2mg/hr and given diuril for oliguria on   -  decrease  back to 5/hr and start Nitroprusside for afterload reduction on   - dobutamine increase back to 7.5mg for elevated lactate  -  started on hydralazine  - lactate 1.9 -->2.3  - VOL and Low flow status on exam  - Swapnil from Central venous sat 7/3  :CVP 25 CO 4 CI 1.9  CVO2 52.4 Lact 2.5 on  5/Bumex 2,   - Diuril 500mg IV x2, hypertonic 3%   x1, Diamox IV 500mg x1 on 7/3  - Nipride drip d/c'd   - Swapnil 7/4; CVP 25, CO 4, CI 2,   - bumex drip currently 1mg/hr (due to sbp 70's-80's around 11 pm)  - CVP 18,  7.5mcg/kg/min and bumex 1mg/hr.        # coronary artery disease   - [facility] on 2022 showed RCA  and significant LAD and diagonal lesions-but was not revascularized due to non viable myocardium  - c/w Aspirin and Plavix   -resume home rosuvastatin 20mg daily (pt c/o intolerance to atorvastatin)    #SVT  - SVT at 130 in ED  - likely in setting of cardiogenic shock  -pt received amio bolus and drip, then BP has normalized, no further episodes of SVT.   -Amio d/c on        GI  #Transaminitis   - iso of VOL - now improving  - abdomen large  - DASH diet now pt comfortable off NIV  -cont protonix for GI ppx  -poor appetite  -nutrition consult        # BRODY on CKD iso cardiorenal  - Baseline creatinine: 2  -worsening crn likely from cardiogenic shock   - inc bumex drip and stat dose of diuril for oilguria and volume overload on   - worsening S creat, today  3  - monitor UO   -avoid nephrotoxins  - pt does not want HD if needed  -  creatinine downtrending 3.42 --> 3.28  Gerardo with hematuria H&H stable.  Asymptomatic.   Discussed with urology with plan to discontinue gerardo, trial of void and send UA to r/o infection v prostate trauma.      Heme:  -cbc stable   -HSQ for DVT ppx      Endo:   -A1c 6.2  -TSH on  wnl   -maintain gluc<180 on bmp    LIne:   RIJ cordis on    Left arm double lumen PIcc line with no blood return->  placed on  at [facility]    Left upper extremity PICC with tip in the brachiocephalic vein on Cxr        #Ethics:  -Overnight, wife expressed wishing for home hospice care option, then this morning, she wants to hold off.   -Palliative team suggests scheduling hospice information session for the wife. , Rajendra on board.   79 year old male known to the [facility] HF clinic (Followed by Dr. Shell), with PMH of CAD with STEMI 5/20222 with cardiogenic shock and IABP placement,  but was not revascularized due to non viable myocardium, ICM (TTE 6/6/24 LVEF 20%, RV systolic dysfunction, moderate MR/TR), LV thrombus, prior lung adenocarcinoma with RUL VATS in 11/2022 s/p radiation therapy comes in with complaints of SOB, LE edema and hypotension. Pt started on bipap, swan placed, pt started on ionotropes and admitted to CCU for management     Neuro  # bipolar disorder/depression/anxiety   - AxOX3  - intermittent lethargy likely from shock state > mental status improved Rass 0 w/no lethargy   - cont home psych meds : FLUoxetine, mirtazapine   - started on ativan Po as needed for anxiety    Resp  #Hypoxic Resp Failure  -remains on nasal cannula  # history of lung cancer s/p RUL lobectomy (9/2022) with recurrent disease s/p radiation to left lung (completed 3/2023),   -Stage IV lung carcinoma, s/p local RT, never on systemic chemotherapy    Cardiac:  Cardiogenic shock  -volume overloaded on exam  -end stage heart failure on exam  -palliative consulted  -patient is a DNR, but would like to continue dobutamine gtt and does not want to go home on hospice, would like to go home with his dobutamine gtt and home care  - is aware and working on his case  -for today, will continue dobutamine gtt at 7.5mcg/kg.in  -continue bumex gtt at 1mg/hr  -continue hydralazine 10 tid  -will remove the cordis today      # coronary artery disease   - [facility] on 5/2022 showed RCA  and significant LAD and diagonal lesions-but was not revascularized due to non viable myocardium  - c/w Aspirin and Plavix   -resume home rosuvastatin 20mg daily (pt c/o intolerance to atorvastatin)      GI  #Transaminitis   likely from end stage heart failure  - abdomen large  - DASH diet now pt comfortable off NIV  -cont protonix for GI ppx  -poor appetite  -nutrition consult        # BRODY  -elevated creatinine 2.94  -elevated BUN of 83  -UA sent today  -intake and output    Heme:  -cbc stable   -HSQ for DVT ppx      Endo:   -A1c 6.2  -TSH on 12/23 wnl   -maintain gluc<180 on bmp    LIne:   RIEMMANUEL cordis on 7/1 to be removed today   Left arm double lumen PIcc line with no blood return->  placed on 6/17 at [facility]    Left upper extremity PICC with tip in the brachiocephalic vein on Cxr    .   79 year old male known to the [facility] HF clinic (Followed by Dr. Shell), with PMH of CAD with STEMI 5/20222 with cardiogenic shock and IABP placement,  but was not revascularized due to non viable myocardium, ICM (TTE 6/6/24 LVEF 20%, RV systolic dysfunction, moderate MR/TR), LV thrombus, prior lung adenocarcinoma with RUL VATS in 11/2022 s/p radiation therapy comes in with complaints of SOB, LE edema and hypotension. Pt started on bipap, swan placed, pt started on ionotropes and admitted to CCU for management     Neuro  # bipolar disorder/depression/anxiety   - AxOX3  - intermittent lethargy likely from shock state > mental status improved Rass 0 w/no lethargy   - cont home psych meds : FLUoxetine, mirtazapine   - started on ativan Po as needed for anxiety    Resp  #Hypoxic Resp Failure  -remains on nasal cannula  # history of lung cancer s/p RUL lobectomy (9/2022) with recurrent disease s/p radiation to left lung (completed 3/2023),   -Stage IV lung carcinoma, s/p local RT, never on systemic chemotherapy    Cardiac:  Cardiogenic shock  -volume overloaded on exam  -end stage heart failure on exam  -palliative consulted  -patient is a DNR, but would like to continue dobutamine gtt and does not want to go home on hospice, would like to go home with his dobutamine gtt and home care  - is aware and working on his case  - continue dobutamine gtt at 7.5mcg/kg.  -dc IV bumex  -start bumex 4mg po BID  -metolazone initiated x 1          # coronary artery disease   - [facility] on 5/2022 showed RCA  and significant LAD and diagonal lesions-but was not revascularized due to non viable myocardium  - c/w Aspirin and Plavix   -resume home rosuvastatin 20mg daily (pt c/o intolerance to atorvastatin)      GI  #Transaminitis   likely from end stage heart failure  - abdomen large  - DASH diet now pt comfortable off NIV  -cont protonix for GI ppx  -poor appetite  -nutrition consult        # BRODY  -elevated creatinine 2.94  -elevated BUN of 83  -UA sent today  -intake and output    Heme:  -cbc stable   -HSQ for DVT ppx      Endo:   -A1c 6.2  -TSH on 12/23 wnl   -maintain gluc<180 on bmp    LIne:   RIJ cordis and left radial arterial line removed today   Left arm double lumen PIcc line with no blood return->  placed on 6/17 at [facility]    Left upper extremity PICC with tip in the brachiocephalic vein on Cxr    Disposition:  Dr. Shell from HF, Dr. Cruz from Palliative Services,  Dr. Nikunj Meza, CCU fellow, had discussion regarding best way to respect patient's wishes and get him home with optimal services and medications. At this time, the best plan is to discharge with home care on dobutamine gtt at 7.5mcg/kg/min.  It is important to the patient and his wife that he continues his dobutamine gtt. He will not be going home with hospice care at this time. He would like to remain comfortable on dobutamine gtt.  Cory is aware of this plan and has spoken with his wife regarding how to best support their needs when patient goes home.

## 2024-07-08 NOTE — PROGRESS NOTE ADULT - ASSESSMENT
79 year old male known to the [facility] HF clinic (Followed by Dr. Shell), with PMH of CAD with STEMI 5/20222 with cardiogenic shock and IABP placement,  but was not revascularized due to non viable myocardium, ICM (TTE 6/6/24 LVEF 20%, RV systolic dysfunction, moderate MR/TR), LV thrombus, prior lung adenocarcinoma with RUL VATS in 11/2022 s/p radiation therapy comes in with complaints of SOB, LE and hypotension. Of note- patient has been seen at [facility] 5 x this year for SOB, BRODY and was placed on Dobutamine gtt home infusion. Per patient's wife he was unable to take his diuretics due to hypotension at home for which in hopes of raising BP he was given a salt load with bullions. He continued to worsen, and patient's wife called [facility] HF clinic. Patient was found to be in cardiogenic shock with massive volume overload.    79 year old male known to the [facility] HF clinic (Followed by Dr. Shell), with PMH of CAD with STEMI 5/20222 with cardiogenic shock and IABP placement,  but was not revascularized due to non viable myocardium, ICM (TTE 6/6/24 LVEF 20%, RV systolic dysfunction, moderate MR/TR), LV thrombus, prior lung adenocarcinoma with RUL VATS in 11/2022 s/p radiation therapy comes in with complaints of SOB, LE and hypotension. Of note- patient has been seen at [facility] 5 x this year for SOB, BRODY and was placed on Dobutamine gtt home infusion. Per patient's wife he was unable to take his diuretics due to hypotension at home for which in hopes of raising BP he was given a salt load with bullions. He continued to worsen, and patient's wife called [facility] HF clinic. Patient was found to be in cardiogenic shock with massive volume overload.   Palliative Care consulted for complex decision making  related to goals of care discussions

## 2024-07-08 NOTE — PROGRESS NOTE ADULT - SUBJECTIVE AND OBJECTIVE BOX
FOLLOW UP:  Acute Decompensated Heart Failure    SUBJECTIVE/OBSERVATIONS:  Patient seen and examined. He was resting at time of exam. He was easily arousable. He remains with  INTERVAL EVENTS:  remains on bumex gtt at 4mg/hr  TELE EVENTS:     Vital Signs Last 24 Hrs  T(C): 36.6 (2024 08:00), Max: 36.6 (2024 20:00)  T(F): 97.8 (2024 08:00), Max: 97.8 (2024 20:00)  HR: 76 (2024 08:00) (72 - 88)  BP: 84/52 (2024 20:00) (84/52 - 84/52)  BP(mean): 63 (2024 20:00) (63 - 63)  RR: 19 (2024 08:00) (9 - 20)  SpO2: 99% (2024 08:00) (96% - 100%)    Parameters below as of 2024 08:00  Patient On (Oxygen Delivery Method): nasal cannula  O2 Flow (L/min): 3    I&O's Summary    2024 07:01  -  2024 07:00  --------------------------------------------------------  IN: 1274.6 mL / OUT: 1825 mL / NET: -550.4 mL    2024 07:01  -  2024 08:41  --------------------------------------------------------  IN: 48.4 mL / OUT: 250 mL / NET: -201.6 mL        MEDICATIONS:  aspirin enteric coated 81 milliGRAM(s) Oral daily  buMETAnide Infusion 1 mG/Hr IV Continuous <Continuous>  clopidogrel Tablet 75 milliGRAM(s) Oral daily  DOBUTamine Infusion 7.5 MICROgram(s)/kG/Min IV Continuous <Continuous>  heparin   Injectable 5000 Unit(s) SubCutaneous every 12 hours  hydrALAZINE 10 milliGRAM(s) Oral every 8 hours        acetaminophen     Tablet .. 325 milliGRAM(s) Oral every 4 hours PRN  FLUoxetine 20 milliGRAM(s) Oral <User Schedule>  LORazepam     Tablet 0.25 milliGRAM(s) Oral at bedtime  melatonin 3 milliGRAM(s) Oral at bedtime  mirtazapine 15 milliGRAM(s) Oral at bedtime    pantoprazole    Tablet 40 milliGRAM(s) Oral before breakfast    rosuvastatin 20 milliGRAM(s) Oral at bedtime    chlorhexidine 2% Cloths 1 Application(s) Topical <User Schedule>  latanoprost 0.005% Ophthalmic Solution 1 Drop(s) Both EYES at bedtime  lidocaine   4% Patch 1 Patch Transdermal every 24 hours PRN  lidocaine 4% Topical Solution 1 Application(s) Topical daily  sodium chloride 0.9% lock flush 10 milliLiter(s) IV Push every 1 hour PRN  tamsulosin 0.8 milliGRAM(s) Oral at bedtime    REVIEW OF SYSTEMS:  CONSTITUTIONAL: endorses weakness  EYES/ENT: No visual changes;  No vertigo or throat pain   NECK: No pain or stiffness  RESPIRATORY: endorses shortness of breath  CARDIOVASCULAR: denies chest pain  GASTROINTESTINAL: endorses abdominal swelling  GENITOURINARY: No dysuria, frequency or hematuria  NEUROLOGICAL: No numbness or weakness  SKIN: No itching, rashes      PHYSICAL EXAM:  General: sick and unstable  Cardiovascular: Normal S1 S2, No JVD, No murmurs, No edema  Respiratory: Lungs clear to auscultation	  Gastrointestinal:  Soft, Non-tender, + BS	  Skin: warm and dry, No rashes, No ecchymoses, No cyanosis	  Extremities: Normal range of motion, No clubbing, cyanosis or edema  Vascular: Peripheral pulses palpable 2+ bilaterally    LABS:	 	    CBC Full  -  ( 2024 04:17 )  WBC Count : 9.17 K/uL  Hemoglobin : 9.6 g/dL  Hematocrit : 27.8 %  Platelet Count - Automated : 94 K/uL  Mean Cell Volume : 85.5 fL  Mean Cell Hemoglobin : 29.5 pg  Mean Cell Hemoglobin Concentration : 34.5 gm/dL  Auto Neutrophil # : x  Auto Lymphocyte # : x  Auto Monocyte # : x  Auto Eosinophil # : x  Auto Basophil # : x  Auto Neutrophil % : x  Auto Lymphocyte % : x  Auto Monocyte % : x  Auto Eosinophil % : x  Auto Basophil % : x    07-    135  |  94<L>  |  83<H>  ----------------------------<  111<H>  2.8<LL>   |  23  |  2.94<H>      133<L>  |  91<L>  |  90<H>  ----------------------------<  109<H>  3.2<L>   |  23  |  3.28<H>    Ca    7.7<L>      2024 04:17  Ca    7.6<L>      2024 06:21  Phos  4.3       Phos  5.3       Mg     1.90       Mg     2.10         TPro  6.3  /  Alb  2.7<L>  /  TBili  3.7<H>  /  DBili  x   /  AST  81<H>  /  ALT  49<H>  /  AlkPhos  374<H>    TPro  5.8<L>  /  Alb  2.9<L>  /  TBili  3.4<H>  /  DBili  x   /  AST  98<H>  /  ALT  55<H>  /  AlkPhos  342<H>            	  < from: TTE Echo Complete w/o Contrast w/ Doppler (24 @ 08:29) >    ACC: [id number] EXAM:  ECHO TTE WO CON COMP W DOPP                          PROCEDURE DATE:  2024          INTERPRETATION:  TRANSTHORACIC ECHOCARDIOGRAM REPORT        Patient Name:   ELYSE MALAVE Patient Location: [address] [place] [address] Rec #:  [id number]            Accession #:      [id number]  Account #:      [id number]             Height:           72.0 in 183.0 cm  YOB: 1945           Weight:           187.4 lb 85.01 kg  Patient Age:    79 years            BSA:              2.07 m²  Patient Gender: M                   BP:               102/64 mmHg      Date of Exam:        2024 8:29:18 AM  Sonographer:         Gerald Lopez  Referring Physician: ALFONZO ARIZA    Procedure:     2D Echo/Doppler/Color Doppler Complete.  Indications:   I50.9 - Heart failure, unspecified  Diagnosis:     I50.9 - Heart failure, unspecified  Study Details: Technically fair study.        2D AND M-MODE MEASUREMENTS (normal ranges within parentheses):  Left                 Normal   Aorta/Left       Normal  Ventricle:                    Atrium:  IVSd (2D):    1.42  (0.7-1.1) Aortic Root    3.90  (2.4-3.7)                 cm             (2D):           cm  LVPWd (2D):   1.33  (0.7-1.1) Left Atrium    4.70  (1.9-4.0)                 cm       (2D):           cm  LVIDd (2D):   5.66  (3.4-5.7) LA Volume      38.5                 cm             Index         ml/m²  LVIDs (2D):   4.44            Right Ventricle:                 cm             TAPSE:           0.92 cm  LV FS (2D):   21.6   (>25%)                  %  Relative Wall 0.47   (<0.42)  Thickness    LV SYSTOLIC FUNCTION BY 2D PLANIMETRY (MOD):  EF-A4C View: 21.2 % EF-A2C View: 22.1 % EF-Biplane: 23 %    LV DIASTOLIC FUNCTION:  MV Peak E: 0.59 m/s Decel Time: 186 msec  MV Peak A: 0.45 m/s  E/A Ratio: 1.30    SPECTRAL DOPPLER ANALYSIS (where applicable):  Mitral Valve:  MV P1/2 Time: 53.94 msec  MV Area, PHT: 4.08 cm²    Mitral Insufficiency by PISA:  MR Volume: 45.43 ml MR Flow Rate: 119.43 ml/s MR EROA: 28.57 mm²    Aortic Valve: AoV Max Dave: 0.72 m/s AoV Peak P.1 mmHg AoV Mean P.2 mmHg    LVOT Vmax: 0.48 m/s LVOT VTI: 0.110 m LVOT Diameter: 2.50 cm    AoV Area, Vmax: 3.26 cm² AoV Area, VTI: 3.53 cm² AoV Area, Vmn: 3.05 cm²  Ao VTI: 0.153  Aortic Insufficiency:  AI Half-time:  658 msec  AI Decel Rate: 1.51 m/s²    Tricuspid Valve and PA/RV Systolic Pressure: TR Max Velocity: 2.72 m/s RA   Pressure: 15 mmHg RVSP/PASP: 44.6 mmHg      PHYSICIAN INTERPRETATION:  Left Ventricle: The left ventricular internal cavity size is mildly   increased. Left ventricular wall thickness is increased. There is mild   concentric left ventricular hypertrophy involving the global wall. The   LVH involves global walls.  Global LV systolic function was severely decreased.Left ventricular   ejection fraction, by visual estimation, is <20%. The interventricular   septum is flattened in systole and diastole, consistent with right   ventricular pressure and volume overload. Spectral Doppler shows normal   pattern of LV diastolic filling.      LV Wall Scoring:  The RCA distribution, entire septum, and entire apex are akinetic. The mid  anterior segment is hypokinetic. All remaining scored segments are normal.    Right Ventricle: The right ventricular size is severely enlarged. RV   systolic function is severely reduced. TV S' 0.1 m/s.  Left Atrium: Mildly enlarged left atrium. LA volume Index is 38.5 ml/m²   ml/m2.  Right Atrium: Severely enlarged right atrium.  Pericardium: Trivial pericardial effusion is present. The pericardial   effusion is localized near the right ventricle.  Mitral Valve: Structurally normal mitral valve, with normal leaflet   excursion. Moderate mitral valve regurgitation is seen. The MR jet is   centrally-directed. Diastolic mitral regurgitation is present.  Tricuspid Valve: Structurally normal tricuspid valve, with normal leaflet   excursion. Moderate-severe tricuspid regurgitation is visualized.   Estimated pulmonary artery systolic pressure is 44.6 mmHg assuming a   right atrialpressure of 15 mmHg, which is consistent with mild pulmonary   hypertension.  Aortic Valve: Normal trileaflet aortic valve with normal opening. Mild   aortic valve regurgitation is seen.  Pulmonic Valve: Structurally normal pulmonic valve, with normal leaflet   excursion. Mild pulmonic valve regurgitation.  Aorta: The aortic root and ascending aorta are structurally normal, with   no evidence of dilitation. The aortic arch was not well visualized.  Pulmonary Artery: The main pulmonary artery is normal in size.  Venous: The inferior vena cava is abnormal. The inferior vena cava was   dilated, with respiratory size variation less tan 50%.      Summary:   1. Biatrial enlargement   2. Left ventricular ejection fraction, by visual estimation, is <20%.   3. Severely decreased global left ventricular systolic function.   4. Entire septum, entire apex, and mid anterior segment are abnormal as   described above.   5. Increased LV wall thickness.   6. Mildly increased left ventricular internal cavity size.   7. Right ventricular volume and pressure overload.   8. There is mild concentric left ventricular hypertrophy.   9. Severely enlarged right ventricle.  10. Severely reduced RV systolic function.  11. Moderate mitral valve regurgitation.  12. Moderate-severe tricuspid regurgitation.  13. Mild aortic regurgitation.  14. Mild pulmonic valve regurgitation.  15. Estimated pulmonary artery systolic pressure is 44.6 mmHg assuming a   right atrial pressure of 15 mmHg, which is consistent with mild pulmonary   hypertension.  16. LA volume Index is 38.5 ml/m² ml/m2.    [id number] Rik Oh , Electronically signed on 2024   at 1:34:30 PM        < from: Cardiac Catheterization (22 @ 15:16) >   FOLLOW UP:  Acute Decompensated Heart Failure    SUBJECTIVE/OBSERVATIONS:  Patient seen and examined. He was resting at time of exam. He was easily arousable. He remains with  INTERVAL EVENTS:  Remains on dobutamine gtt at 7.5mcg/kg/min  remains on bumex gtt at 1mg/hr    TELE EVENTS:   atrial fibrillation    Vital Signs Last 24 Hrs  T(C): 36.6 (2024 08:00), Max: 36.6 (2024 20:00)  T(F): 97.8 (2024 08:00), Max: 97.8 (2024 20:00)  HR: 76 (2024 08:00) (72 - 88)  BP: 84/52 (2024 20:00) (84/52 - 84/52)  BP(mean): 63 (2024 20:00) (63 - 63)  RR: 19 (2024 08:00) (9 - 20)  SpO2: 99% (2024 08:00) (96% - 100%)    Parameters below as of 2024 08:00  Patient On (Oxygen Delivery Method): nasal cannula  O2 Flow (L/min): 3    I&O's Summary    2024 07:01  -  2024 07:00  --------------------------------------------------------  IN: 1274.6 mL / OUT: 1825 mL / NET: -550.4 mL    2024 07:01  -  2024 08:41  --------------------------------------------------------  IN: 48.4 mL / OUT: 250 mL / NET: -201.6 mL    MEDICATIONS:  aspirin enteric coated 81 milliGRAM(s) Oral daily  buMETAnide Infusion 1 mG/Hr IV Continuous <Continuous>  clopidogrel Tablet 75 milliGRAM(s) Oral daily  DOBUTamine Infusion 7.5 MICROgram(s)/kG/Min IV Continuous <Continuous>  heparin   Injectable 5000 Unit(s) SubCutaneous every 12 hours  hydrALAZINE 10 milliGRAM(s) Oral every 8 hours  acetaminophen     Tablet .. 325 milliGRAM(s) Oral every 4 hours PRN  FLUoxetine 20 milliGRAM(s) Oral <User Schedule>  LORazepam     Tablet 0.25 milliGRAM(s) Oral at bedtime  melatonin 3 milliGRAM(s) Oral at bedtime  mirtazapine 15 milliGRAM(s) Oral at bedtime  pantoprazole    Tablet 40 milliGRAM(s) Oral before breakfast  rosuvastatin 20 milliGRAM(s) Oral at bedtime  chlorhexidine 2% Cloths 1 Application(s) Topical <User Schedule>  latanoprost 0.005% Ophthalmic Solution 1 Drop(s) Both EYES at bedtime  lidocaine   4% Patch 1 Patch Transdermal every 24 hours PRN  lidocaine 4% Topical Solution 1 Application(s) Topical daily  sodium chloride 0.9% lock flush 10 milliLiter(s) IV Push every 1 hour PRN  tamsulosin 0.8 milliGRAM(s) Oral at bedtime    REVIEW OF SYSTEMS:  CONSTITUTIONAL: endorses weakness  EYES/ENT: No visual changes;  No vertigo or throat pain   NECK: No pain or stiffness  RESPIRATORY: endorses shortness of breath  CARDIOVASCULAR: denies chest pain  GASTROINTESTINAL: endorses abdominal swelling  GENITOURINARY: No dysuria, frequency or hematuria  NEUROLOGICAL: No numbness or weakness  SKIN: No itching, rashes      PHYSICAL EXAM:  General: sick and unstable  Cardiovascular: irregular rate and rhythm  Respiratory: bibasilar crackles  Gastrointestinal:  +ascites, abdominal swelling with active bowel sounds  Skin: yellowish hue,   Extremities: Normal range of motion, No clubbing, cyanosis or edema  Vascular: Peripheral pulses palpable 2+ bilaterally    LABS:	 	    CBC Full  -  ( 2024 04:17 )  WBC Count : 9.17 K/uL  Hemoglobin : 9.6 g/dL  Hematocrit : 27.8 %  Platelet Count - Automated : 94 K/uL  Mean Cell Volume : 85.5 fL  Mean Cell Hemoglobin : 29.5 pg  Mean Cell Hemoglobin Concentration : 34.5 gm/dL  Auto Neutrophil # : x  Auto Lymphocyte # : x  Auto Monocyte # : x  Auto Eosinophil # : x  Auto Basophil # : x  Auto Neutrophil % : x  Auto Lymphocyte % : x  Auto Monocyte % : x  Auto Eosinophil % : x  Auto Basophil % : x    07-08    135  |  94<L>  |  83<H>  ----------------------------<  111<H>  2.8<LL>   |  23  |  2.94<H>  07    133<L>  |  91<L>  |  90<H>  ----------------------------<  109<H>  3.2<L>   |  23  |  3.28<H>    Ca    7.7<L>      2024 04:17  Ca    7.6<L>      2024 06:21  Phos  4.3       Phos  5.3       Mg     1.90       Mg     2.10         TPro  6.3  /  Alb  2.7<L>  /  TBili  3.7<H>  /  DBili  x   /  AST  81<H>  /  ALT  49<H>  /  AlkPhos  374<H>    TPro  5.8<L>  /  Alb  2.9<L>  /  TBili  3.4<H>  /  DBili  x   /  AST  98<H>  /  ALT  55<H>  /  AlkPhos  342<H>            	  < from: TTE Echo Complete w/o Contrast w/ Doppler (24 @ 08:29) >    ACC: [id number] EXAM:  ECHO TTE WO CON COMP W DOPP                          PROCEDURE DATE:  2024          INTERPRETATION:  TRANSTHORACIC ECHOCARDIOGRAM REPORT        Patient Name:   ELYSE MALAVE Patient Location: [address] [place] [address] Rec #:  [id number]            Accession #:      [id number]  Account #:      [id number]             Height:           72.0 in 183.0 cm  YOB: 1945           Weight:           187.4 lb 85.01 kg  Patient Age:    79 years            BSA:              2.07 m²  Patient Gender: M                   BP:               102/64 mmHg      Date of Exam:        2024 8:29:18 AM  Sonographer:         Gerald Lopez  Referring Physician: ALFONZO ARIZA    Procedure:     2D Echo/Doppler/Color Doppler Complete.  Indications:   I50.9 - Heart failure, unspecified  Diagnosis:     I50.9 - Heart failure, unspecified  Study Details: Technically fair study.        2D AND M-MODE MEASUREMENTS (normal ranges within parentheses):  Left                 Normal   Aorta/Left       Normal  Ventricle:                    Atrium:  IVSd (2D):    1.42  (0.7-1.1) Aortic Root    3.90  (2.4-3.7)                 cm             (2D):           cm  LVPWd (2D):   1.33  (0.7-1.1) Left Atrium    4.70  (1.9-4.0)                 cm       (2D):           cm  LVIDd (2D):   5.66  (3.4-5.7) LA Volume      38.5                 cm             Index         ml/m²  LVIDs (2D):   4.44            Right Ventricle:                 cm             TAPSE:           0.92 cm  LV FS (2D):   21.6   (>25%)                  %  Relative Wall 0.47   (<0.42)  Thickness    LV SYSTOLIC FUNCTION BY 2D PLANIMETRY (MOD):  EF-A4C View: 21.2 % EF-A2C View: 22.1 % EF-Biplane: 23 %    LV DIASTOLIC FUNCTION:  MV Peak E: 0.59 m/s Decel Time: 186 msec  MV Peak A: 0.45 m/s  E/A Ratio: 1.30    SPECTRAL DOPPLER ANALYSIS (where applicable):  Mitral Valve:  MV P1/2 Time: 53.94 msec  MV Area, PHT: 4.08 cm²    Mitral Insufficiency by PISA:  MR Volume: 45.43 ml MR Flow Rate: 119.43 ml/s MR EROA: 28.57 mm²    Aortic Valve: AoV Max Dave: 0.72 m/s AoV Peak P.1 mmHg AoV Mean P.2 mmHg    LVOT Vmax: 0.48 m/s LVOT VTI: 0.110 m LVOT Diameter: 2.50 cm    AoV Area, Vmax: 3.26 cm² AoV Area, VTI: 3.53 cm² AoV Area, Vmn: 3.05 cm²  Ao VTI: 0.153  Aortic Insufficiency:  AI Half-time:  658 msec  AI Decel Rate: 1.51 m/s²    Tricuspid Valve and PA/RV Systolic Pressure: TR Max Velocity: 2.72 m/s RA   Pressure: 15 mmHg RVSP/PASP: 44.6 mmHg      PHYSICIAN INTERPRETATION:  Left Ventricle: The left ventricular internal cavity size is mildly   increased. Left ventricular wall thickness is increased. There is mild   concentric left ventricular hypertrophy involving the global wall. The   LVH involves global walls.  Global LV systolic function was severely decreased.Left ventricular   ejection fraction, by visual estimation, is <20%. The interventricular   septum is flattened in systole and diastole, consistent with right   ventricular pressure and volume overload. Spectral Doppler shows normal   pattern of LV diastolic filling.      LV Wall Scoring:  The RCA distribution, entire septum, and entire apex are akinetic. The mid  anterior segment is hypokinetic. All remaining scored segments are normal.    Right Ventricle: The right ventricular size is severely enlarged. RV   systolic function is severely reduced. TV S' 0.1 m/s.  Left Atrium: Mildly enlarged left atrium. LA volume Index is 38.5 ml/m²   ml/m2.  Right Atrium: Severely enlarged right atrium.  Pericardium: Trivial pericardial effusion is present. The pericardial   effusion is localized near the right ventricle.  Mitral Valve: Structurally normal mitral valve, with normal leaflet   excursion. Moderate mitral valve regurgitation is seen. The MR jet is   centrally-directed. Diastolic mitral regurgitation is present.  Tricuspid Valve: Structurally normal tricuspid valve, with normal leaflet   excursion. Moderate-severe tricuspid regurgitation is visualized.   Estimated pulmonary artery systolic pressure is 44.6 mmHg assuming a   right atrialpressure of 15 mmHg, which is consistent with mild pulmonary   hypertension.  Aortic Valve: Normal trileaflet aortic valve with normal opening. Mild   aortic valve regurgitation is seen.  Pulmonic Valve: Structurally normal pulmonic valve, with normal leaflet   excursion. Mild pulmonic valve regurgitation.  Aorta: The aortic root and ascending aorta are structurally normal, with   no evidence of dilitation. The aortic arch was not well visualized.  Pulmonary Artery: The main pulmonary artery is normal in size.  Venous: The inferior vena cava is abnormal. The inferior vena cava was   dilated, with respiratory size variation less tan 50%.      Summary:   1. Biatrial enlargement   2. Left ventricular ejection fraction, by visual estimation, is <20%.   3. Severely decreased global left ventricular systolic function.   4. Entire septum, entire apex, and mid anterior segment are abnormal as   described above.   5. Increased LV wall thickness.   6. Mildly increased left ventricular internal cavity size.   7. Right ventricular volume and pressure overload.   8. There is mild concentric left ventricular hypertrophy.   9. Severely enlarged right ventricle.  10. Severely reduced RV systolic function.  11. Moderate mitral valve regurgitation.  12. Moderate-severe tricuspid regurgitation.  13. Mild aortic regurgitation.  14. Mild pulmonic valve regurgitation.  15. Estimated pulmonary artery systolic pressure is 44.6 mmHg assuming a   right atrial pressure of 15 mmHg, which is consistent with mild pulmonary   hypertension.  16. LA volume Index is 38.5 ml/m² ml/m2.    [id number] Rik Oh , Electronically signed on 2024   at 1:34:30 PM        < from: Cardiac Catheterization (22 @ 15:16) >

## 2024-07-08 NOTE — PROGRESS NOTE ADULT - CONVERSATION/DISCUSSION
Diagnosis/Prognosis/Treatment Options/Hospice Referral
Diagnosis/Prognosis/Treatment Options/Hospice Referral

## 2024-07-08 NOTE — PROGRESS NOTE ADULT - PROBLEM SELECTOR PROBLEM 5
Encounter for palliative care
Counseling regarding advance directives and goals of care
Encounter for palliative care

## 2024-07-08 NOTE — PROGRESS NOTE ADULT - ASSESSMENT
This is a 79 year old male known to the [facility] HF clinic (Followed by Dr. Shell), with PMH of CAD with STEMI  with cardiogenic shock and IABP placement (medically managed d/t non-viable myocardium), ICM/HFrEF (EF 20%, RV systolic dysfunction, moderate MR/TR; on  since ), LV thrombus, prior lung adenocarcinoma with RUL VATS in 2022 s/p radiation therapy presented on  with complaints of SOB, LE and hypotension. In ED patient is tachypenic, tachycardic with BP 60-130s (SVT), hypotensive with SBP 70-90s, O2 sat 70s, and was placed on BIPAP. Home Dobutamine gtt was held in ED due to SVT, but was restarted s/p HF consultation. Overall stage D HF, NYHA class IV- is in cardiogenic shock with massive volume overload. Improving with Dobutamine/Bumex gtt.

## 2024-07-08 NOTE — PROGRESS NOTE ADULT - SUBJECTIVE AND OBJECTIVE BOX
Medications:  acetaminophen     Tablet .. 325 milliGRAM(s) Oral every 4 hours PRN  aspirin enteric coated 81 milliGRAM(s) Oral daily  buMETAnide Infusion 1 mG/Hr IV Continuous <Continuous>  chlorhexidine 2% Cloths 1 Application(s) Topical <User Schedule>  clopidogrel Tablet 75 milliGRAM(s) Oral daily  DOBUTamine Infusion 7.5 MICROgram(s)/kG/Min IV Continuous <Continuous>  FLUoxetine 20 milliGRAM(s) Oral <User Schedule>  heparin   Injectable 5000 Unit(s) SubCutaneous every 12 hours  hydrALAZINE 10 milliGRAM(s) Oral every 8 hours  latanoprost 0.005% Ophthalmic Solution 1 Drop(s) Both EYES at bedtime  lidocaine   4% Patch 1 Patch Transdermal every 24 hours PRN  lidocaine 4% Topical Solution 1 Application(s) Topical daily  LORazepam     Tablet 0.25 milliGRAM(s) Oral at bedtime  melatonin 3 milliGRAM(s) Oral at bedtime  mirtazapine 15 milliGRAM(s) Oral at bedtime  pantoprazole    Tablet 40 milliGRAM(s) Oral before breakfast  potassium chloride    Tablet ER 40 milliEquivalent(s) Oral every 4 hours  potassium chloride  20 mEq/100 mL IVPB 20 milliEquivalent(s) IV Intermittent every 1 hour  rosuvastatin 20 milliGRAM(s) Oral at bedtime  sodium chloride 0.9% lock flush 10 milliLiter(s) IV Push every 1 hour PRN  spironolactone 25 milliGRAM(s) Oral daily  tamsulosin 0.8 milliGRAM(s) Oral at bedtime      Vitals:  Vital Signs Last 24 Hrs  T(C): 36.6 (08 Jul 2024 08:00), Max: 36.6 (07 Jul 2024 20:00)  T(F): 97.8 (08 Jul 2024 08:00), Max: 97.8 (07 Jul 2024 20:00)  HR: 76 (08 Jul 2024 08:00) (72 - 88)  BP: 84/52 (07 Jul 2024 20:00) (84/52 - 84/52)  BP(mean): 63 (07 Jul 2024 20:00) (63 - 63)  RR: 19 (08 Jul 2024 08:00) (9 - 20)  SpO2: 99% (08 Jul 2024 08:00) (96% - 100%)    Parameters below as of 08 Jul 2024 08:00  Patient On (Oxygen Delivery Method): nasal cannula  O2 Flow (L/min): 3      Daily     Daily     I&O's Detail    07 Jul 2024 07:01  -  08 Jul 2024 07:00  --------------------------------------------------------  IN:    Bumetanide: 115 mL    DOBUTamine: 441.6 mL    IV PiggyBack: 100 mL    Oral Fluid: 618 mL  Total IN: 1274.6 mL    OUT:    Indwelling Catheter - Urethral (mL): 1275 mL    Voided (mL): 550 mL  Total OUT: 1825 mL    Total NET: -550.4 mL      08 Jul 2024 07:01  -  08 Jul 2024 09:55  --------------------------------------------------------  IN:    Bumetanide: 10 mL    DOBUTamine: 38.4 mL  Total IN: 48.4 mL    OUT:    Voided (mL): 250 mL  Total OUT: 250 mL    Total NET: -201.6 mL          Physical Exam:     General: No distress. Comfortable.  HEENT: EOM intact.  Neck: Neck supple. JVP not elevated. No masses  Chest: Clear to auscultation bilaterally  CV: Normal S1 and S2. No murmurs, rub, or gallops. Radial pulses normal.  Abdomen: Soft, non-distended, non-tender  Skin: No rashes or skin breakdown  Neurology: Alert and oriented times three. Sensation intact  Psych: Affect normal    Labs:                        9.6    9.17  )-----------( 94       ( 08 Jul 2024 04:17 )             27.8     07-08    135  |  94<L>  |  83<H>  ----------------------------<  111<H>  2.8<LL>   |  23  |  2.94<H>    Ca    7.7<L>      08 Jul 2024 04:17  Phos  4.3     07-08  Mg     1.90     07-08    TPro  6.3  /  Alb  2.7<L>  /  TBili  3.7<H>  /  DBili  x   /  AST  81<H>  /  ALT  49<H>  /  AlkPhos  374<H>  07-08           Patient seen and examined. Wife present by bedside. Patient states he feels better. No SOB at rest.   Wife deciding on hospice vs home with infusion company as prior.     Medications:  acetaminophen     Tablet .. 325 milliGRAM(s) Oral every 4 hours PRN  aspirin enteric coated 81 milliGRAM(s) Oral daily  buMETAnide Infusion 1 mG/Hr IV Continuous <Continuous>  chlorhexidine 2% Cloths 1 Application(s) Topical <User Schedule>  clopidogrel Tablet 75 milliGRAM(s) Oral daily  DOBUTamine Infusion 7.5 MICROgram(s)/kG/Min IV Continuous <Continuous>  FLUoxetine 20 milliGRAM(s) Oral <User Schedule>  heparin   Injectable 5000 Unit(s) SubCutaneous every 12 hours  hydrALAZINE 10 milliGRAM(s) Oral every 8 hours  latanoprost 0.005% Ophthalmic Solution 1 Drop(s) Both EYES at bedtime  lidocaine   4% Patch 1 Patch Transdermal every 24 hours PRN  lidocaine 4% Topical Solution 1 Application(s) Topical daily  LORazepam     Tablet 0.25 milliGRAM(s) Oral at bedtime  melatonin 3 milliGRAM(s) Oral at bedtime  mirtazapine 15 milliGRAM(s) Oral at bedtime  pantoprazole    Tablet 40 milliGRAM(s) Oral before breakfast  potassium chloride    Tablet ER 40 milliEquivalent(s) Oral every 4 hours  potassium chloride  20 mEq/100 mL IVPB 20 milliEquivalent(s) IV Intermittent every 1 hour  rosuvastatin 20 milliGRAM(s) Oral at bedtime  sodium chloride 0.9% lock flush 10 milliLiter(s) IV Push every 1 hour PRN  spironolactone 25 milliGRAM(s) Oral daily  tamsulosin 0.8 milliGRAM(s) Oral at bedtime      Vitals:  Vital Signs Last 24 Hrs  T(C): 36.6 (08 Jul 2024 08:00), Max: 36.6 (07 Jul 2024 20:00)  T(F): 97.8 (08 Jul 2024 08:00), Max: 97.8 (07 Jul 2024 20:00)  HR: 76 (08 Jul 2024 08:00) (72 - 88)  BP: 84/52 (07 Jul 2024 20:00) (84/52 - 84/52)  BP(mean): 63 (07 Jul 2024 20:00) (63 - 63)  RR: 19 (08 Jul 2024 08:00) (9 - 20)  SpO2: 99% (08 Jul 2024 08:00) (96% - 100%)    Parameters below as of 08 Jul 2024 08:00  Patient On (Oxygen Delivery Method): nasal cannula  O2 Flow (L/min): 3      Daily     Daily     I&O's Detail    07 Jul 2024 07:01  -  08 Jul 2024 07:00  --------------------------------------------------------  IN:    Bumetanide: 115 mL    DOBUTamine: 441.6 mL    IV PiggyBack: 100 mL    Oral Fluid: 618 mL  Total IN: 1274.6 mL    OUT:    Indwelling Catheter - Urethral (mL): 1275 mL    Voided (mL): 550 mL  Total OUT: 1825 mL    Total NET: -550.4 mL      08 Jul 2024 07:01  -  08 Jul 2024 09:55  --------------------------------------------------------  IN:    Bumetanide: 10 mL    DOBUTamine: 38.4 mL  Total IN: 48.4 mL    OUT:    Voided (mL): 250 mL  Total OUT: 250 mL    Total NET: -201.6 mL          Physical Exam:     General: No distress. Comfortable.  HEENT: EOM intact.  Neck: Neck supple. JVP elevated 18cm. No masses  Chest: Clear to auscultation bilaterally  CV: Normal S1 and S2. No murmurs, rub, or gallops. Radial pulses normal. No LE edema and is warm b/l.   Abdomen: Soft, non-distended, non-tender  Skin: No rashes or skin breakdown  Neurology: Alert and oriented times three. Sensation intact  Psych: Affect normal    Labs:                        9.6    9.17  )-----------( 94       ( 08 Jul 2024 04:17 )             27.8     07-08    135  |  94<L>  |  83<H>  ----------------------------<  111<H>  2.8<LL>   |  23  |  2.94<H>    Ca    7.7<L>      08 Jul 2024 04:17  Phos  4.3     07-08  Mg     1.90     07-08    TPro  6.3  /  Alb  2.7<L>  /  TBili  3.7<H>  /  DBili  x   /  AST  81<H>  /  ALT  49<H>  /  AlkPhos  374<H>  07-08

## 2024-07-08 NOTE — PROGRESS NOTE ADULT - PROBLEM SELECTOR PLAN 5
Case reviewed with CCU team and hospice team   Thank you for allowing us to participate in your patient's care. Please page [phone] for any questions/concerns.
Case reviewed with CCU team and hospice team   Thank you for allowing us to participate in your patient's care. Please page [phone] for any questions/concerns.
Per wife Kenia's request spoke outside patient's room separately. Kenia shared concerns that patient is overall with guarded prognosis due to his heart failure and BRODY. She shared that if patient's time is limited, she would want to pass away at home. Reassured her that CCU team will continue to medically optimize patient, but if patient continues to decline despite medical interventions and her goal is for patient to be home, reviewed that home hospice option can be revisited.   See GOC note

## 2024-07-08 NOTE — PROGRESS NOTE ADULT - TIME BILLING
Time spent for extensive review of the physical chart, electronic medical record, and documentation to obtain collateral information including but not limited to:  [x] Inpatient records (ED, H&P, primary team, and consultants if applicable, care coordination)  [x] Inpatient values/results (biomarkers, immunoassays, imaging, and microbiology results)  [x] Current or proposed treatment plans  [x] Pharmacotherapy review  [x] Discussion and coordinating care with primary team and interdisciplinary staff and floor staff  [x] Discussion including counseling/ education with the patient, surrogate decision maker, or family

## 2024-07-08 NOTE — PROGRESS NOTE ADULT - PROBLEM SELECTOR PLAN 2
- Patient with restlessness due to pain in his back and legs; he states pain in leg is cramping in nature and back pain is worse with certain positions. He states flexeril is helpful for his cramping.   - Agree with Lidocaine Patch   - On Flexeril as per primary team   - PO Tylenol 650mg q6 PRN mild-moderate pain   - Can start PO Oxycodone IR 2.5mg q6 PRN severe pain (hold for hypotension, oversedation, respiratory depression) ; bowel regimen while on opiates - Cr- 2.8 on 7/8  - management as per primary team

## 2024-07-08 NOTE — PROGRESS NOTE ADULT - PROBLEM SELECTOR PLAN 4
Wife verbalized understanding of poor prognosis from his HF. Patient shared that he does not wish to return to the hospital further in the future. Wife shares she does not want patient to suffer, and wishes for him to pass away at home. Reviewed recommendation for hospice philosophy of care/services. Wife amenable to hospice referral for information gathering.   See GOC note Case reviewed with CCU team and hospice team     Thank you for allowing us to participate in your patient's care.  Patient to be discharged from [facility] team consult service today.   Please re-consult if we can be of further assistance, page [phone].

## 2024-07-08 NOTE — PROGRESS NOTE ADULT - PROBLEM SELECTOR PLAN 3
- Patient with mild anxiety impacting sleep  - Per wife, patient with good relief from ativan at night, but has intermittent anxiety during daytime  - Can Liberate to PO Ativan 0.25mg q6 PRN anxiety (hold for oversedation, respiratory depression) Spoke with wife Kenia who shares that goal at this time is continue dobutamine gtt for as long as it is medically necessary. She declines home hospice services at this time.   See GOC note

## 2024-07-08 NOTE — PROGRESS NOTE ADULT - CRITICAL CARE ATTENDING COMMENT
Hypotension, hypothermia despite pressor/ inotrope, diuresis  Overall grim prognosis  Appreciate Palliative care input, advanced care plans in discussion
79 year old man with known CAD sp STEMI in , ICM with LVEF 20%, moderate MR and TR, LV thrombus. Complaining of increased dyspnea and hypotension. Admitted to CCU for acute on chronic systolic heart failure and cardiogenic shock      This AM CVO2 42/CO 3.1/CI 1.5/SVR 1280  Lactate 9  Nipride added,  at 5 and Bumex increased to 2 mg/hr with a dose of diuril    Meds  Nipride gtt  Bumex 2 mg/hr  Dobutmaine 2.5 mcg/kg/min  Amio gtt  ASA  Atorva  Plavix  Fluoxetine 20  SQ heparin  Pantoprazole    #Neuro- No active issues  #Pulm- Now on 4L NC, monitor  #CV  Cardiogenic shock  Continue  and Nipride  Continue Bumex gtt  Dc Amio  #Renal - Family and patient do not want HD/UF
HFrEF, restarted on Nipride gtt, increase Dobutamine gtt with aggressive diuresis regimen  Appreciate Palliative input, Wife is aware that if current medical therapy effort fails, overall prognosis is grim
Mr. Becerra is a 79-year-old Male with h/o bipolar disorder, CAD/STEMI (2022) medically managed, stage D HFrEF/ICM (EF 15-20%, LVEDD 5.6 cm; on  3 mcg/kg/min since ) w/o ICD, CKD (b/l Cr 1.5-2), BPH, anxiety/depression/bipolar disorder, h/o lung cancer s/p RUL lobectomy (2022) with recurrent disease s/p radiation to left lung (completed 3/2023) w/ possible recurrence (?), multiple admissions for ADHF (at [facility]) presented from home for persistently low BP and weight gain. Of note, had been hospitalized at  from - for decompensated heart failure found to be inotrope dependent. Had reportedly refused hospice and was discharged on dobutamine. Had persistently low BP at home and was being given diuretics/metolazone but due to BP of 70-80s wife was concerned about giving him diuretics. Had progressive orthopnea/PND. On arrival, BP 90/50, placed on Bipap. Had paroxysms of SVT to 130s (hemodynamically tolerated). Started on amio. Had a Cordis placed with low venous sat. Dobutamine was increased due to lactate of 9. A-line placed and started on nipride. On exam, chronically ill appearing, JvP severely elevated, RRR, dec BS at bases, nontender abdomen, b/l pitting edema to sacrum, cool to palpation. Labs reviewed - Hb 11.5, BUN/Cr 95/3.56 (40/2.05), Tbili 3.4, alk phos 425, BNP 13k, lactat 4.3 (from 9.4). C  - 100% RCA, 70% pLAD. TTE reviewed - severe biventricular dysfunction, mod MR, mod-severe TR, IVC dilated, LVOT VTI 11 cm. Overall stage D HF, NYHA class IV, INTERMACS 3 with high-risk features and guarded prognosis.  - c/w  5  - increase nipride to SBP   - bumex gtt as above; goal net negative; give diuril and 3% saline  - monitor electrolytes; Keep K>4, Mg>2  - discussed GOC; DNR/DNI; agreeable to pall care consult
D/c hydralazine.  D/c A line. D/c central line.  Change Bumex to 4 mg po bid. Start metolazone 2.5 mg po qd.  Continue dobutamine at 7.5.  Spoke with [place] medical director. Home hospice will not provide dobutamine. Will need to arrange VNS with other home services.
More lethargic and uncomforable today. Urinating to bumex gtt but overall condition guarded. Wife is understanding and considering hospice. Support provided.  - continue with current interventions w/o escalation  - transition to comfort measures

## 2024-07-08 NOTE — PROGRESS NOTE ADULT - PROBLEM SELECTOR PLAN 1
- TTE 6/6 -  1. Biatrial enlargement 2. Left ventricular ejection fraction, by visual estimation, is <20%.  3. Severely decreased global left ventricular systolic function.  4. Entire septum, entire apex, and mid anterior segment are abnormal as described above.  5. Increased LV wall thickness.  6. Mildly increased left ventricular internal cavity size.   7. Right ventricular volume and pressure overload. 8. There is mild concentric left ventricular hypertrophy.  9. Severely enlarged right ventricle.10. Severely reduced RV systolic function.11. Moderate mitral valve regurgitation.  12. Moderate-severe tricuspid regurgitation.13. Mild aortic regurgitation.14. Mild pulmonic valve regurgitation.  15. Estimated pulmonary artery systolic pressure is 44.6 mmHg assuming a right atrial pressure of 15 mmHg, which is consistent with mild pulmonary hypertension.16. LA volume Index is 38.5 ml/m² ml/m2.  - CCU and Heart Failure recommendations appreciated  - Per discussion with CCU team, patient in cardiogenic shock, currently on dobutamine gtt and bumex gtt  - management as per primary team. - TTE 6/6 -  1. Biatrial enlargement 2. Left ventricular ejection fraction, by visual estimation, is <20%.  3. Severely decreased global left ventricular systolic function.  4. Entire septum, entire apex, and mid anterior segment are abnormal as described above.  5. Increased LV wall thickness.  6. Mildly increased left ventricular internal cavity size.   7. Right ventricular volume and pressure overload. 8. There is mild concentric left ventricular hypertrophy.  9. Severely enlarged right ventricle.10. Severely reduced RV systolic function.11. Moderate mitral valve regurgitation.  12. Moderate-severe tricuspid regurgitation.13. Mild aortic regurgitation.14. Mild pulmonic valve regurgitation.  15. Estimated pulmonary artery systolic pressure is 44.6 mmHg assuming a right atrial pressure of 15 mmHg, which is consistent with mild pulmonary hypertension.16. LA volume Index is 38.5 ml/m² ml/m2.  - CCU and Heart Failure recommendations appreciated  - Patient currently on dobutamine gtt and bumex gtt  - management as per primary team.

## 2024-07-08 NOTE — PROGRESS NOTE ADULT - PROBLEM SELECTOR PLAN 1
Patient is feeling better on higher dose of Dobutamine gtt.  Am hemos on Dobutamine 7.5mcg/kg/min  : CVP 18-21, central sat 66%, CI 3.0, CO 6.26.   Continue Dobutamine 7.5mcg/kg/min   As d/w Dr. Shell. D/c Bumex gtt. Start Bumex 4 mg po BID and metolazone 2.5 mg po daily.   D/c aretha, d/c cordis.  Keep K 4.0-5.0. and Mag >2.0   Strict I/O.   Patient is DNR/DNI.   Patient needs home inotrope.  Wife and patient will decide on services depending on continuation of dobutamine gtt.

## 2024-07-08 NOTE — PROGRESS NOTE ADULT - CONVERSATION DETAILS
Per wife Kenia's request spoke outside patient's room separately. Kenia shared that while she hopes for patient to be able to improve and medically stabilize to go home, she is concerned about patient's current clinical status and overall guarded prognosis due to his heart failure and BRODY. She shared that if patient's time is limited, she would want to pass away comfortably at home.     Reassured her that CCU team will continue to medically optimize patient, which is her hope. Discussed that if patient does not improve and he declines despite medical interventions and her goal is for patient to be home, reviewed that home hospice option can be revisited. Hospice philosophy of care/ services reviewed. Reviewed that if hospice referral made, can have patient evaluated for time limited capped dobutamine gtt under hospice services.     Emotional support provided
Kenia shares while she understands that overall prognosis is guarded, goal at this time is continue dobutamine gtt for as long as it is medically necessary. She declines home hospice services at this time.

## 2024-07-08 NOTE — PROGRESS NOTE ADULT - SUBJECTIVE AND OBJECTIVE BOX
Date of Service  : 7/8/2024  Indication for Geriatrics and Palliative Care Services:  goals of care    SUBJECTIVE AND OBJECTIVE:  Patient seen and examined at bedside. Patient states he did not sleep well last night. Denied pain/ dyspnea    INTERVAL HPI/OVERNIGHT EVENTS:  No acute overnight events     Allergies    No Known Allergies    Intolerances    MEDICATIONS  (STANDING):  aspirin enteric coated 81 milliGRAM(s) Oral daily  buMETAnide IVPB 4 milliGRAM(s) IV Intermittent two times a day  chlorhexidine 2% Cloths 1 Application(s) Topical <User Schedule>  clopidogrel Tablet 75 milliGRAM(s) Oral daily  DOBUTamine Infusion 7.5 MICROgram(s)/kG/Min (19.2 mL/Hr) IV Continuous <Continuous>  FLUoxetine 20 milliGRAM(s) Oral <User Schedule>  heparin   Injectable 5000 Unit(s) SubCutaneous every 12 hours  latanoprost 0.005% Ophthalmic Solution 1 Drop(s) Both EYES at bedtime  lidocaine 4% Topical Solution 1 Application(s) Topical daily  LORazepam     Tablet 0.25 milliGRAM(s) Oral at bedtime  melatonin 3 milliGRAM(s) Oral at bedtime  metolazone 2.5 milliGRAM(s) Oral daily  mirtazapine 15 milliGRAM(s) Oral at bedtime  pantoprazole    Tablet 40 milliGRAM(s) Oral before breakfast  potassium chloride    Tablet ER 20 milliEquivalent(s) Oral two times a day  rosuvastatin 20 milliGRAM(s) Oral at bedtime  spironolactone 25 milliGRAM(s) Oral daily  tamsulosin 0.8 milliGRAM(s) Oral at bedtime    MEDICATIONS  (PRN):  acetaminophen     Tablet .. 325 milliGRAM(s) Oral every 4 hours PRN Moderate Pain (4 - 6)  lidocaine   4% Patch 1 Patch Transdermal every 24 hours PRN pain  sodium chloride 0.9% lock flush 10 milliLiter(s) IV Push every 1 hour PRN Pre/post blood products, medications, blood draw, and to maintain line patency      ITEMS UNCHECKED ARE NOT PRESENT    PRESENT SYMPTOMS: [ ]Unable to self-report due to altered mental status- see [ ] CPOT [ ] PAINADS [ ] RDOS  Source if other than patient:  [ ]Family   [ ]Team     Pain:  [ ]yes [x ]no  QOL impact -   Location -                    Aggravating factors -   Quality -   Radiation -   Timing-   Severity (0-10 scale):   Minimal acceptable level / Pain Goal (0-10 scale):     Dyspnea:                           [ ]Mild [ ]Moderate [ ]Severe  Anxiety:                             [ ]Mild [ ]Moderate [ ]Severe  Agitation:                          [ ]Mild [ ]Moderate [ ]Severe  Fatigue:                             [ ]Mild [ ]Moderate [ ]Severe  Nausea:                             [ ]Mild [ ]Moderate [ ]Severe  Loss of appetite:              [ ]Mild [ ]Moderate [ ]Severe  Constipation:                   [ ]Mild [ ]Moderate [ ]Severe  Diarrhea:                          [ ]Mild [ ]Moderate [ ]Severe    CPOT:    [email or web address]/Critical-Care-Pain-Observation-Tool-(CPOT)    PCSSQ[Palliative Care Spiritual Screening Question]   Severity (0-10):  Score of 4 or > indicate consideration of Chaplaincy referral.  Chaplaincy Referral: [ ] yes [ ] refused [x ] following [ ] deferred    Caregiver [place]? : [ ] yes [ ] no [ ] Declined [x ] Deferred              Social work referral [ ] Patient & Family Centered Care Referral [ ]     Anticipatory Grief present?:  [x ] yes [ ] no  [ ] Deferred                  Social work referral [ ] Chaplaincy Referral[ ]    Other Symptoms:  [x ]All other review of systems negative , except orthopnea   [ ] Unable to obtain due to poor mentation     PHYSICAL EXAM:  Vital Signs Last 24 Hrs  T(C): 36.4 (08 Jul 2024 17:21), Max: 36.6 (07 Jul 2024 20:00)  T(F): 97.5 (08 Jul 2024 17:21), Max: 97.8 (07 Jul 2024 20:00)  HR: 84 (08 Jul 2024 18:00) (74 - 87)  BP: 86/46 (08 Jul 2024 18:00) (83/52 - 93/57)  BP(mean): 59 (08 Jul 2024 18:00) (59 - 72)  RR: 23 (08 Jul 2024 18:00) (9 - 30)  SpO2: 97% (08 Jul 2024 18:00) (96% - 100%)    Parameters below as of 08 Jul 2024 18:00  Patient On (Oxygen Delivery Method): room air         GENERAL:  [x ]Alert  [x ]Oriented x  3 [ ]Lethargic  [ ]Cachexia  [ ]Unarousable  [ x]Verbal  [ ]Non-Verbal  [x ] No Distress  Behavioral:   [ ] Anxiety  [ ] Delirium [ ] Agitation [x ] calm  HEENT:  [ x]Normal  [ ] Temporal Wasting  [ ]Dry mouth   [ ]ET Tube/Trach  [ ]Oral lesions  [ ] Mucositis  PULMONARY: unlabored breathing, no accessory muscle use   [ ]Clear [ ]Tachypnea   [ ]Audible excessive secretions   [ ]Rhonchi        [ ]Right [ ]Left [ ]Bilateral  [ ]Crackles        [ ]Right [ ]Left [ ]Bilateral  [ ]Wheezing     [ ]Right [ ]Left [ ]Bilateral  [ ]Diminished breath sounds [ ]right [ ]left [ ]bilateral  CARDIOVASCULAR:    [x ]Regular [ ]Irregular [ ]Tachy  [ ]Christiano [ ]Murmur [ ]Other  GASTROINTESTINAL:  [ x]Soft  [ ]Distended   [ ]+BS  [ x]Non tender [ ]Tender  [ ]PEG [ ]OGT/ NGT  Last BM: 7/4  GENITOURINARY:  [x ]Normal [ ] Incontinent   [ ]Oliguria/Anuria   [ ]Chandler  MUSCULOSKELETAL:   [x ]Normal   [ ]Weakness  [ ]Bed/Wheelchair bound [x ]Edema in b/l lower extremities   [  ] contraction  NEUROLOGIC:   [ x]No focal deficits  [ ]Cognitive impairment  [ ]Dysphagia [ ]Dysarthria [ ]Paresis [ ]Other   SKIN: See Nursing Skin Assessment for further details  [x]Normal    [ ]Rash  [ ]Pressure ulcer(s)       Present on admission [ ]y [ ]n   [  ]  Wound    [  ] hyperpigmentation      CRITICAL CARE:  [x ]Shock Present  [ ]Septic [x ]Cardiogenic [ ]Neurologic [ ]Hypovolemic  [ ]Vasopressors [ x]Inotropes  [ ]Respiratory failure present [ ]Mechanical Ventilation [ ]Non-invasive ventilatory support [ ]High-Flow   [ ]Acute  [ ]Chronic [ ]Hypoxic  [ ]Hypercarbic [ ]Other  [ x]Other organ failure - renal    LABS:  reviewed                                      10.1   10.60 )-----------( 116      ( 05 Jul 2024 01:30 )             29.4       07-05    127<L>  |  87<L>  |  105<H>  ----------------------------<  130<H>  3.2<L>   |  19<L>  |  3.95<H>    Ca    7.7<L>      05 Jul 2024 01:30  Phos  6.2     07-05  Mg     2.30     07-05    TPro  6.5  /  Alb  3.0<L>  /  TBili  3.3<H>  /  DBili  x   /  AST  150<H>  /  ALT  73<H>  /  AlkPhos  343<H>  07-05        RADIOLOGY & ADDITIONAL STUDIES: reviewed     Protein Calorie Malnutrition Present: [ ]mild [ ]moderate [ ]severe [ ]underweight [ ]morbid obesity  https://www.andeal.org/vault/2440/web/files/ONC/Table_Clinical%20Characteristics%20to%20Document%20Malnutrition-White%20JV%20et%20al%202012.pdf    Height (cm): 182.9 (07-01-24 @ 13:27), 182.9 (06-07-24 @ 12:30), 182.9 (05-30-24 @ 07:31)  Weight (kg): 85.2 (07-01-24 @ 18:00), 86.2 (06-14-24 @ 10:00), 84.4 (05-30-24 @ 07:31)  BMI (kg/m2): 25.5 (07-01-24 @ 18:00), 25.8 (07-01-24 @ 13:27), 25.8 (06-14-24 @ 10:00)    [ ]PPSV2 < or = 30%  [ ]significant weight loss [ ]poor nutritional intake [ ]anasarca   [ ]Artificial Nutrition    REFERRALS:   [ ]Chaplaincy  [ ]Hospice  [ ]Child Life  [ ]Social Work  [ x]Case management [ ]Holistic Therapy          Date of Service  : 7/8/2024  Indication for Geriatrics and Palliative Care Services:  goals of care    SUBJECTIVE AND OBJECTIVE:  Patient seen and examined at bedside. Patient states he did not sleep well last night. Denied pain/ dyspnea    INTERVAL HPI/OVERNIGHT EVENTS:  No acute overnight events     Allergies    No Known Allergies    Intolerances    MEDICATIONS  (STANDING):  aspirin enteric coated 81 milliGRAM(s) Oral daily  buMETAnide IVPB 4 milliGRAM(s) IV Intermittent two times a day  chlorhexidine 2% Cloths 1 Application(s) Topical <User Schedule>  clopidogrel Tablet 75 milliGRAM(s) Oral daily  DOBUTamine Infusion 7.5 MICROgram(s)/kG/Min (19.2 mL/Hr) IV Continuous <Continuous>  FLUoxetine 20 milliGRAM(s) Oral <User Schedule>  heparin   Injectable 5000 Unit(s) SubCutaneous every 12 hours  latanoprost 0.005% Ophthalmic Solution 1 Drop(s) Both EYES at bedtime  lidocaine 4% Topical Solution 1 Application(s) Topical daily  LORazepam     Tablet 0.25 milliGRAM(s) Oral at bedtime  melatonin 3 milliGRAM(s) Oral at bedtime  metolazone 2.5 milliGRAM(s) Oral daily  mirtazapine 15 milliGRAM(s) Oral at bedtime  pantoprazole    Tablet 40 milliGRAM(s) Oral before breakfast  potassium chloride    Tablet ER 20 milliEquivalent(s) Oral two times a day  rosuvastatin 20 milliGRAM(s) Oral at bedtime  spironolactone 25 milliGRAM(s) Oral daily  tamsulosin 0.8 milliGRAM(s) Oral at bedtime    MEDICATIONS  (PRN):  acetaminophen     Tablet .. 325 milliGRAM(s) Oral every 4 hours PRN Moderate Pain (4 - 6)  lidocaine   4% Patch 1 Patch Transdermal every 24 hours PRN pain  sodium chloride 0.9% lock flush 10 milliLiter(s) IV Push every 1 hour PRN Pre/post blood products, medications, blood draw, and to maintain line patency      ITEMS UNCHECKED ARE NOT PRESENT    PRESENT SYMPTOMS: [ ]Unable to self-report due to altered mental status- see [ ] CPOT [ ] PAINADS [ ] RDOS  Source if other than patient:  [ ]Family   [ ]Team     Pain:  [ ]yes [x ]no  QOL impact -   Location -                    Aggravating factors -   Quality -   Radiation -   Timing-   Severity (0-10 scale):   Minimal acceptable level / Pain Goal (0-10 scale):     Dyspnea:                           [ ]Mild [ ]Moderate [ ]Severe  Anxiety:                             [ ]Mild [ ]Moderate [ ]Severe  Agitation:                          [ ]Mild [ ]Moderate [ ]Severe  Fatigue:                             [ ]Mild [ ]Moderate [ ]Severe  Nausea:                             [ ]Mild [ ]Moderate [ ]Severe  Loss of appetite:              [ ]Mild [ ]Moderate [ ]Severe  Constipation:                   [ ]Mild [ ]Moderate [ ]Severe  Diarrhea:                          [ ]Mild [ ]Moderate [ ]Severe    CPOT:    https://[email or web address]/getattachment/[email or web address]/Critical-Care-Pain-Observation-Tool-(CPOT)    PCSSQ[Palliative Care Spiritual Screening Question]   Severity (0-10):  Score of 4 or > indicate consideration of Chaplaincy referral.  Chaplaincy Referral: [ ] yes [ ] refused [x ] following [ ] deferred    Caregiver [place]? : [ ] yes [ ] no [ ] Declined [x ] Deferred              Social work referral [ ] Patient & Family Centered Care Referral [ ]     Anticipatory Grief present?:  [x ] yes [ ] no  [ ] Deferred                  Social work referral [ ] Chaplaincy Referral[ ]    Other Symptoms:  [x ]All other review of systems negative , except orthopnea   [ ] Unable to obtain due to poor mentation     PHYSICAL EXAM:  Vital Signs Last 24 Hrs  T(C): 36.4 (08 Jul 2024 17:21), Max: 36.6 (07 Jul 2024 20:00)  T(F): 97.5 (08 Jul 2024 17:21), Max: 97.8 (07 Jul 2024 20:00)  HR: 84 (08 Jul 2024 18:00) (74 - 87)  BP: 86/46 (08 Jul 2024 18:00) (83/52 - 93/57)  BP(mean): 59 (08 Jul 2024 18:00) (59 - 72)  RR: 23 (08 Jul 2024 18:00) (9 - 30)  SpO2: 97% (08 Jul 2024 18:00) (96% - 100%)    Parameters below as of 08 Jul 2024 18:00  Patient On (Oxygen Delivery Method): room air         GENERAL:  [x ]Alert  [x ]Oriented x  3 [ ]Lethargic  [ ]Cachexia  [ ]Unarousable  [ x]Verbal  [ ]Non-Verbal  [x ] No Distress  Behavioral:   [ ] Anxiety  [ ] Delirium [ ] Agitation [x ] calm  HEENT:  [ x]Normal  [ ] Temporal Wasting  [ ]Dry mouth   [ ]ET Tube/Trach  [ ]Oral lesions  [ ] Mucositis  PULMONARY: unlabored breathing, no accessory muscle use   [ ]Clear [ ]Tachypnea   [ ]Audible excessive secretions   [ ]Rhonchi        [ ]Right [ ]Left [ ]Bilateral  [ ]Crackles        [ ]Right [ ]Left [ ]Bilateral  [ ]Wheezing     [ ]Right [ ]Left [ ]Bilateral  [ ]Diminished breath sounds [ ]right [ ]left [ ]bilateral  CARDIOVASCULAR:    [x ]Regular [ ]Irregular [ ]Tachy  [ ]Christiano [ ]Murmur [ ]Other  GASTROINTESTINAL:  [ x]Soft  [ ]Distended   [ ]+BS  [ x]Non tender [ ]Tender  [ ]PEG [ ]OGT/ NGT  Last BM: 7/4  GENITOURINARY:  [x ]Normal [ ] Incontinent   [ ]Oliguria/Anuria   [ ]Chandler  MUSCULOSKELETAL:   [x ]Normal   [x ]Weakness  [ ]Bed/Wheelchair bound [x ]Edema in b/l lower extremities   [  ] contraction  NEUROLOGIC:   [ x]No focal deficits  [ ]Cognitive impairment  [ ]Dysphagia [ ]Dysarthria [ ]Paresis [ ]Other   SKIN: See Nursing Skin Assessment for further details  [x]Normal    [ ]Rash  [ ]Pressure ulcer(s)       Present on admission [ ]y [ ]n   [  ]  Wound    [  ] hyperpigmentation      CRITICAL CARE:  [x ]Shock Present  [ ]Septic [x ]Cardiogenic [ ]Neurologic [ ]Hypovolemic  [ ]Vasopressors [ x]Inotropes  [ ]Respiratory failure present [ ]Mechanical Ventilation [ ]Non-invasive ventilatory support [ ]High-Flow   [ ]Acute  [ ]Chronic [ ]Hypoxic  [ ]Hypercarbic [ ]Other  [ x]Other organ failure - renal    LABS:  reviewed                                             9.6    9.17  )-----------( 94       ( 08 Jul 2024 04:17 )             27.8       07-08    135  |  95<L>  |  76<H>  ----------------------------<  182<H>  4.1   |  23  |  2.80<H>    Ca    7.6<L>      08 Jul 2024 13:30  Phos  3.8     07-08  Mg     2.50     07-08    TPro  6.1  /  Alb  2.8<L>  /  TBili  3.8<H>  /  DBili  x   /  AST  78<H>  /  ALT  48<H>  /  AlkPhos  369<H>  07-08        RADIOLOGY & ADDITIONAL STUDIES: reviewed     Protein Calorie Malnutrition Present: [ ]mild [ ]moderate [ ]severe [ ]underweight [ ]morbid obesity  https://www.andeal.org/vault/2440/web/files/ONC/Table_Clinical%20Characteristics%20to%20Document%20Malnutrition-White%20JV%20et%20al%202012.pdf    Height (cm): 182.9 (07-01-24 @ 13:27), 182.9 (06-07-24 @ 12:30), 182.9 (05-30-24 @ 07:31)  Weight (kg): 85.2 (07-01-24 @ 18:00), 86.2 (06-14-24 @ 10:00), 84.4 (05-30-24 @ 07:31)  BMI (kg/m2): 25.5 (07-01-24 @ 18:00), 25.8 (07-01-24 @ 13:27), 25.8 (06-14-24 @ 10:00)    [ ]PPSV2 < or = 30%  [ ]significant weight loss [ ]poor nutritional intake [ ]anasarca   [ ]Artificial Nutrition    REFERRALS:   [ ]Chaplaincy  [ ]Hospice  [ ]Child Life  [ ]Social Work  [ x]Case management [ ]Holistic Therapy

## 2024-07-09 LAB
ALBUMIN SERPL ELPH-MCNC: 2.9 G/DL — LOW (ref 3.3–5)
ALP SERPL-CCNC: 391 U/L — HIGH (ref 40–120)
ALT FLD-CCNC: 46 U/L — HIGH (ref 4–41)
ANION GAP SERPL CALC-SCNC: 16 MMOL/L — HIGH (ref 7–14)
AST SERPL-CCNC: 74 U/L — HIGH (ref 4–40)
BILIRUB SERPL-MCNC: 3.8 MG/DL — HIGH (ref 0.2–1.2)
BUN SERPL-MCNC: 70 MG/DL — HIGH (ref 7–23)
CALCIUM SERPL-MCNC: 8.1 MG/DL — LOW (ref 8.4–10.5)
CHLORIDE SERPL-SCNC: 96 MMOL/L — LOW (ref 98–107)
CO2 SERPL-SCNC: 22 MMOL/L — SIGNIFICANT CHANGE UP (ref 22–31)
CREAT SERPL-MCNC: 2.83 MG/DL — HIGH (ref 0.5–1.3)
CULTURE RESULTS: SIGNIFICANT CHANGE UP
CULTURE RESULTS: SIGNIFICANT CHANGE UP
EGFR: 22 ML/MIN/1.73M2 — LOW
GLUCOSE BLDC GLUCOMTR-MCNC: 139 MG/DL — HIGH (ref 70–99)
GLUCOSE SERPL-MCNC: 102 MG/DL — HIGH (ref 70–99)
HCT VFR BLD CALC: 29.1 % — LOW (ref 39–50)
HGB BLD-MCNC: 9.6 G/DL — LOW (ref 13–17)
MAGNESIUM SERPL-MCNC: 2.2 MG/DL — SIGNIFICANT CHANGE UP (ref 1.6–2.6)
MCHC RBC-ENTMCNC: 29.3 PG — SIGNIFICANT CHANGE UP (ref 27–34)
MCHC RBC-ENTMCNC: 33 GM/DL — SIGNIFICANT CHANGE UP (ref 32–36)
MCV RBC AUTO: 88.7 FL — SIGNIFICANT CHANGE UP (ref 80–100)
NRBC # BLD: 0 /100 WBCS — SIGNIFICANT CHANGE UP (ref 0–0)
NRBC # FLD: 0 K/UL — SIGNIFICANT CHANGE UP (ref 0–0)
PHOSPHATE SERPL-MCNC: 3.5 MG/DL — SIGNIFICANT CHANGE UP (ref 2.5–4.5)
PLATELET # BLD AUTO: 70 K/UL — LOW (ref 150–400)
POTASSIUM SERPL-MCNC: 4.3 MMOL/L — SIGNIFICANT CHANGE UP (ref 3.5–5.3)
POTASSIUM SERPL-SCNC: 4.3 MMOL/L — SIGNIFICANT CHANGE UP (ref 3.5–5.3)
PROT SERPL-MCNC: 6.4 G/DL — SIGNIFICANT CHANGE UP (ref 6–8.3)
RBC # BLD: 3.28 M/UL — LOW (ref 4.2–5.8)
RBC # FLD: 18.3 % — HIGH (ref 10.3–14.5)
SODIUM SERPL-SCNC: 134 MMOL/L — LOW (ref 135–145)
SPECIMEN SOURCE: SIGNIFICANT CHANGE UP
SPECIMEN SOURCE: SIGNIFICANT CHANGE UP
WBC # BLD: 8.21 K/UL — SIGNIFICANT CHANGE UP (ref 3.8–10.5)
WBC # FLD AUTO: 8.21 K/UL — SIGNIFICANT CHANGE UP (ref 3.8–10.5)

## 2024-07-09 PROCEDURE — 99232 SBSQ HOSP IP/OBS MODERATE 35: CPT | Mod: FS

## 2024-07-09 PROCEDURE — 99233 SBSQ HOSP IP/OBS HIGH 50: CPT | Mod: FS

## 2024-07-09 RX ORDER — DOBUTAMINE HCL 250MG/20ML
0 VIAL (ML) INTRAVENOUS
Qty: 0 | Refills: 0 | DISCHARGE
Start: 2024-07-09

## 2024-07-09 RX ORDER — POTASSIUM CHLORIDE 600 MG/1
20 TABLET, FILM COATED, EXTENDED RELEASE ORAL DAILY
Refills: 0 | Status: DISCONTINUED | OUTPATIENT
Start: 2024-07-10 | End: 2024-07-10

## 2024-07-09 RX ORDER — CEFTRIAXONE SODIUM 500 MG
1000 VIAL (EA) INJECTION EVERY 24 HOURS
Refills: 0 | Status: DISCONTINUED | OUTPATIENT
Start: 2024-07-09 | End: 2024-07-11

## 2024-07-09 RX ORDER — POLYETHYLENE GLYCOL 3350 1 G/G
17 POWDER ORAL
Refills: 0 | Status: DISCONTINUED | OUTPATIENT
Start: 2024-07-09 | End: 2024-07-11

## 2024-07-09 RX ORDER — BUMETANIDE 0.25 MG/ML
6 INJECTION INTRAMUSCULAR; INTRAVENOUS
Refills: 0 | Status: DISCONTINUED | OUTPATIENT
Start: 2024-07-09 | End: 2024-07-11

## 2024-07-09 RX ADMIN — Medication 19.2 MICROGRAM(S)/KG/MIN: at 09:36

## 2024-07-09 RX ADMIN — Medication 1 APPLICATION(S): at 05:21

## 2024-07-09 RX ADMIN — HEPARIN SODIUM 5000 UNIT(S): 50 INJECTION, SOLUTION INTRAVENOUS at 17:09

## 2024-07-09 RX ADMIN — LATANOPROST PF 1 DROP(S): 0.05 SOLUTION/ DROPS OPHTHALMIC at 21:19

## 2024-07-09 RX ADMIN — HEPARIN SODIUM 5000 UNIT(S): 50 INJECTION, SOLUTION INTRAVENOUS at 05:09

## 2024-07-09 RX ADMIN — Medication 0.25 MILLIGRAM(S): at 21:08

## 2024-07-09 RX ADMIN — CLOPIDOGREL BISULFATE 75 MILLIGRAM(S): 75 TABLET, FILM COATED ORAL at 14:11

## 2024-07-09 RX ADMIN — Medication 100 MILLIGRAM(S): at 11:06

## 2024-07-09 RX ADMIN — Medication 3 MILLIGRAM(S): at 21:09

## 2024-07-09 RX ADMIN — BUMETANIDE 6 MILLIGRAM(S): 0.25 INJECTION INTRAMUSCULAR; INTRAVENOUS at 17:10

## 2024-07-09 RX ADMIN — LIDOCAINE HCL 1 PATCH: 28 GEL TOPICAL at 05:21

## 2024-07-09 RX ADMIN — POLYETHYLENE GLYCOL 3350 17 GRAM(S): 1 POWDER ORAL at 21:08

## 2024-07-09 RX ADMIN — SPIRONOLACTONE 25 MILLIGRAM(S): 25 TABLET ORAL at 05:11

## 2024-07-09 RX ADMIN — POTASSIUM CHLORIDE 20 MILLIEQUIVALENT(S): 600 TABLET, FILM COATED, EXTENDED RELEASE ORAL at 05:12

## 2024-07-09 RX ADMIN — ASPIRIN 81 MILLIGRAM(S): 325 TABLET, FILM COATED ORAL at 14:10

## 2024-07-09 RX ADMIN — BUMETANIDE 132 MILLIGRAM(S): 0.25 INJECTION INTRAMUSCULAR; INTRAVENOUS at 05:07

## 2024-07-09 RX ADMIN — ROSUVASTATIN CALCIUM 20 MILLIGRAM(S): 20 TABLET ORAL at 21:18

## 2024-07-09 RX ADMIN — TAMSULOSIN HYDROCHLORIDE 0.8 MILLIGRAM(S): 0.4 CAPSULE ORAL at 21:09

## 2024-07-09 RX ADMIN — PANTOPRAZOLE SODIUM 40 MILLIGRAM(S): 40 INJECTION, POWDER, FOR SOLUTION INTRAVENOUS at 06:23

## 2024-07-09 RX ADMIN — MIRTAZAPINE 15 MILLIGRAM(S): 15 TABLET, FILM COATED ORAL at 21:08

## 2024-07-09 NOTE — PROGRESS NOTE ADULT - PROBLEM SELECTOR PLAN 1
Patient is feeling better on higher dose of Dobutamine gtt.  Continue Dobutamine 7.5mcg/kg/min.  D/C IV Bumex. Start Bumex 6 mg po BID and metolazone 2.5 mg po daily.   Keep K 4.0-5.0. and Mag >2.0   Strict I/O.   Patient is DNR/DNI.   Patient unable to get dobutamine on hospice. Therefore he will go home with prior home infusion service.   D/c planning.

## 2024-07-09 NOTE — PROGRESS NOTE ADULT - ASSESSMENT
This is a 79 year old male known to the [facility] HF clinic (Followed by Dr. Shell), with PMH of CAD with STEMI  with cardiogenic shock and IABP placement (medically managed d/t non-viable myocardium), ICM/HFrEF (EF 20%, RV systolic dysfunction, moderate MR/TR; on  since ), LV thrombus, prior lung adenocarcinoma with RUL VATS in 2022 s/p radiation therapy presented on  with complaints of SOB, LE and hypotension. In ED patient is tachypenic, tachycardic with BP 60-130s (SVT), hypotensive with SBP 70-90s, O2 sat 70s, and was placed on BIPAP. Home Dobutamine gtt was held in ED due to SVT, but was restarted s/p HF consultation. Overall stage D HF, NYHA class IV- is in cardiogenic shock with massive volume overload. Improving with Dobutamine/Bumex gtt.

## 2024-07-09 NOTE — PROGRESS NOTE ADULT - ASSESSMENT
79 year old male known to the [facility] HF clinic (Followed by Dr. Shell), with PMH of CAD with STEMI 5/20222 with cardiogenic shock and IABP placement,  but was not revascularized due to non viable myocardium, ICM (TTE 6/6/24 LVEF 20%, RV systolic dysfunction, moderate MR/TR), LV thrombus, prior lung adenocarcinoma with RUL VATS in 11/2022 s/p radiation therapy comes in with complaints of SOB, LE edema and hypotension. Pt started on bipap, swan placed, pt started on ionotropes and admitted to CCU for management     Neuro  # bipolar disorder/depression/anxiety   - AxOX3  - intermittent lethargy likely from shock state > mental status improved Rass 0 w/no lethargy   - cont home psych meds : FLUoxetine, mirtazapine   - started on ativan Po as needed for anxiety    Resp  #Hypoxic Resp Failure  -wean off bipap, now on RA today  # history of lung cancer s/p RUL lobectomy (9/2022) with recurrent disease s/p radiation to left lung (completed 3/2023),   -Stage IV lung carcinoma, s/p local RT, never on systemic chemotherapy    Cardiac:  Cardiogenic shock  -volume overloaded on exam  -end stage heart failure on exam  -palliative consulted  -patient is a DNR, but would like to continue dobutamine gtt and does not want to go home on hospice, would like to go home with his dobutamine gtt and home care  - is aware and working on his case  - continue dobutamine gtt at 7.5mcg/kg.   Bumex drip switch to  bumex 4mg IV BID on 7/8            # coronary artery disease   - LHC on 5/2022 showed RCA  and significant LAD and diagonal lesions-but was not revascularized due to non viable myocardium  - c/w Aspirin and Plavix   -resume home rosuvastatin 20mg daily (pt c/o intolerance to atorvastatin)      GI  #Transaminitis   likely from end stage heart failure  - Abdomen distension iso fluid overload  - LFT down trending  -tolerating  DASH/ renal diet  diet 1500mL Fluid Restriction ([id number])  -cont protonix for GI ppx  -poor appetite  -nutrition consult- started on  Suplena 8oz, 2x daily        # BRODY  -elevated creatinine but stable 2.8  -intake and output: IN: 1516.8 mL / OUT: 2025 mL / NET: -508.2 mL    Heme:  -cbc stable   -HSQ for DVT ppx      Endo:   -A1c 6.2  -TSH on 12/23 wnl   -maintain gluc<180 on bmp    ID  -Hypothermia resolved  - UA + on 7/8  - bld cx normal  - WBC normal  - No compliant of burning urination  - will  monitor off abx     LIne:   Left arm double lumen PIcc line with no blood return->  placed on 6/17 at [facility]    Left upper extremity PICC with tip in the brachiocephalic vein on Cxr 79 year old male known to the [facility] HF clinic (Followed by Dr. Shell), with PMH of CAD with STEMI 5/20222 with cardiogenic shock and IABP placement,  but was not revascularized due to non viable myocardium, ICM (TTE 6/6/24 LVEF 20%, RV systolic dysfunction, moderate MR/TR), LV thrombus, prior lung adenocarcinoma with RUL VATS in 11/2022 s/p radiation therapy comes in with complaints of SOB, LE edema and hypotension. Pt started on bipap, swan placed, pt started on ionotropes and admitted to CCU for management     Neuro    # bipolar disorder/depression/anxiety   - AxOX3  - intermittent lethargy likely from shock state > mental status improved Rass 0 w/no lethargy   - cont home psych meds : FLUoxetine, mirtazapine   - started on ativan Po as needed for anxiety    Resp  #Hypoxic Resp Failure  -wean off bipap, now on RA today  # history of lung cancer s/p RUL lobectomy (9/2022) with recurrent disease s/p radiation to left lung (completed 3/2023),   -Stage IV lung carcinoma, s/p local RT, never on systemic chemotherapy    Cardiac:  Cardiogenic shock  -volume overloaded on exam  -end stage heart failure on exam  -palliative consulted  -patient is a DNR, but would like to continue dobutamine gtt and does not want to go home on hospice, would like to go home with his dobutamine gtt and home care  - is aware and working on his case  -continue dobutamine gtt at 7.5mcg/kg.   Bumex drip switch to  bumex 4mg IV BID on 7/8            # coronary artery disease   - LHC on 5/2022 showed RCA  and significant LAD and diagonal lesions-but was not revascularized due to non viable myocardium  - c/w Aspirin and Plavix   -resume home rosuvastatin 20mg daily (pt c/o intolerance to atorvastatin)      GI  #Transaminitis   likely from end stage heart failure  - Abdomen distension iso fluid overload  - LFT down trending  -tolerating  DASH/ renal diet  diet 1500mL Fluid Restriction ([id number])  -cont protonix for GI ppx  -poor appetite  -nutrition consult- started on  Suplena 8oz, 2x daily        # BRODY  -elevated creatinine but stable 2.8  -intake and output: IN: 1516.8 mL / OUT: 2025 mL / NET: -508.2 mL    Heme:  -cbc stable   -HSQ for DVT ppx      Endo:   -A1c 6.2  -TSH on 12/23 wnl   -maintain gluc<180 on bmp    ID  -Hypothermia resolved  - UA + on 7/8  - bld cx normal  - WBC normal  - + compliant of burning urination  - will send Ucx  - start on ceftriaxone for complicated UTI    Line:   Left arm double lumen PIcc line with no blood return->  placed on 6/17 at [facility]    Left upper extremity PICC with tip in the brachiocephalic vein on Cxr 79 year old male known to the [facility] HF clinic (Followed by Dr. Shell), with PMH of CAD with STEMI 5/20222 with cardiogenic shock and IABP placement,  but was not revascularized due to non viable myocardium, ICM (TTE 6/6/24 LVEF 20%, RV systolic dysfunction, moderate MR/TR), LV thrombus, prior lung adenocarcinoma with RUL VATS in 11/2022 s/p radiation therapy comes in with complaints of SOB, LE edema and hypotension. Pt started on bipap, swan placed, pt started on ionotropes and admitted to CCU for management     Neuro    # bipolar disorder/depression/anxiety   - AxOX3  - intermittent lethargy likely from shock state > mental status improved Rass 0 w/no lethargy   - cont home psych meds : FLUoxetine, mirtazapine   - started on ativan Po as needed for anxiety    Resp  #Hypoxic Resp Failure  -wean off bipap, now on RA today  # history of lung cancer s/p RUL lobectomy (9/2022) with recurrent disease s/p radiation to left lung (completed 3/2023),   -Stage IV lung carcinoma, s/p local RT, never on systemic chemotherapy    Cardiac:  #Cardiogenic shock  -volume overloaded on exam  -end stage heart failure on exam  -palliative consulted  -patient is a DNR, but would like to continue dobutamine gtt and does not want to go home on hospice, would like to go home with his dobutamine gtt and home care  - is aware and working on his case  -continue dobutamine gtt at 7.5mcg/kg.   -Bumex drip switch to  bumex 4mg IV BID on 7/8  - c/w  metolazone 2.5 daily  - Plan to send pt on  bumex 4mg PO bid and metolazone 2.5 on discharge            # coronary artery disease   - LHC on 5/2022 showed RCA  and significant LAD and diagonal lesions-but was not revascularized due to non viable myocardium  - c/w Aspirin and Plavix   -resume home rosuvastatin 20mg daily (pt c/o intolerance to atorvastatin)      GI  #Transaminitis   likely from end stage heart failure  - Abdomen distension iso fluid overload  - LFT down trending  -tolerating  DASH/ renal diet  diet 1500mL Fluid Restriction ([id number])  -cont protonix for GI ppx  -poor appetite  -nutrition consult- started on  Suplena 8oz, 2x daily        # BRODY  -elevated creatinine but stable 2.8  -intake and output: IN: 1516.8 mL / OUT: 2025 mL / NET: -508.2 mL    Heme:  -cbc stable   -HSQ for DVT ppx      Endo:   -A1c 6.2  -TSH on 12/23 wnl   -maintain gluc<180 on bmp    ID  -Hypothermia resolved  - UA + on 7/8  - bld cx normal  - WBC normal  - + compliant of burning urination  - will send Ucx  - start on ceftriaxone for complicated UTI    Line:   Left arm double lumen PIcc line with no blood return->  placed on 6/17 at [facility]    Left upper extremity PICC with tip in the brachiocephalic vein on Cxr 79 year old male known to the [facility] HF clinic (Followed by Dr. Shell), with PMH of CAD with STEMI 5/20222 with cardiogenic shock and IABP placement,  but was not revascularized due to non viable myocardium, ICM (TTE 6/6/24 LVEF 20%, RV systolic dysfunction, moderate MR/TR), LV thrombus, prior lung adenocarcinoma with RUL VATS in 11/2022 s/p radiation therapy comes in with complaints of SOB, LE edema and hypotension. Pt started on bipap, swan placed, pt started on ionotropes and admitted to CCU for management     Neuro    # bipolar disorder/depression/anxiety   - AxOX3  - intermittent lethargy likely from shock state > mental status improved Rass 0 w/no lethargy   - cont home psych meds : FLUoxetine, mirtazapine   - started on ativan Po as needed for anxiety    Resp  #Hypoxic Resp Failure  -wean off bipap, now on RA today  # history of lung cancer s/p RUL lobectomy (9/2022) with recurrent disease s/p radiation to left lung (completed 3/2023),   -Stage IV lung carcinoma, s/p local RT, never on systemic chemotherapy    Cardiac:  #Cardiogenic shock  -volume overloaded on exam  -end stage heart failure on exam  -palliative consulted  -patient is a DNR, but would like to continue dobutamine gtt and does not want to go home on hospice, would like to go home with his dobutamine gtt and home care  - is aware and working on his case  -continue dobutamine gtt at 7.5mcg/kg.   -Bumex drip switch to  bumex 4mg IV BID on 7/8  - c/w  metolazone 2.5 daily  - Plan to send pt on  bumex 4mg PO bid and metolazone 2.5 on discharge            # coronary artery disease   - LHC on 5/2022 showed RCA  and significant LAD and diagonal lesions-but was not revascularized due to non viable myocardium  - c/w Aspirin and Plavix   -resume home rosuvastatin 20mg daily (pt c/o intolerance to atorvastatin)      GI  #Transaminitis   likely from end stage heart failure  - Abdomen distension iso fluid overload  - LFT down trending  -tolerating  DASH/ renal diet  diet 1500mL Fluid Restriction ([id number])  -cont protonix for GI ppx  -poor appetite  -nutrition consult- started on  Suplena 8oz, 2x daily        # BRODY  -elevated creatinine but stable 2.8  -intake and output: IN: 1516.8 mL / OUT: 2025 mL / NET: -508.2 mL    Heme:  -cbc stable   -HSQ for DVT ppx      Endo:   -A1c 6.2  -TSH on 12/23 wnl   -maintain gluc<180 on bmp    ID  -Hypothermia resolved  - UA + on 7/8  - bld cx normal  - WBC normal  - + compliant of burning urination  - will send Ucx  - start on ceftriaxone for complicated UTI x 5days    Line:   Left arm double lumen PIcc line with no blood return->  placed on 6/17 at [facility]    Left upper extremity PICC with tip in the brachiocephalic vein on Cxr 79 year old male known to the [facility] HF clinic (Followed by Dr. Shell), with PMH of CAD with STEMI 5/20222 with cardiogenic shock and IABP placement,  but was not revascularized due to non viable myocardium, ICM (TTE 6/6/24 LVEF 20%, RV systolic dysfunction, moderate MR/TR), LV thrombus, prior lung adenocarcinoma with RUL VATS in 11/2022 s/p radiation therapy comes in with complaints of SOB, LE edema and hypotension. Pt started on bipap, swan placed, pt started on ionotropes and admitted to CCU for management     Neuro    # bipolar disorder/depression/anxiety   - AxOX3  - intermittent lethargy likely from shock state > mental status improved  w/no lethargy   - cont home psych meds : FLUoxetine, mirtazapine   - started on ativan Po as needed for anxiety    Resp  #Hypoxic Resp Failure  -wean off bipap, now on RA today  # history of lung cancer s/p RUL lobectomy (9/2022) with recurrent disease s/p radiation to left lung (completed 3/2023),   -Stage IV lung carcinoma, s/p local RT, never on systemic chemotherapy    Cardiac:  #Cardiogenic shock  -volume overloaded on exam  -end stage heart failure on exam  -palliative consulted  -patient is a DNR, but would like to continue dobutamine gtt and does not want to go home on hospice, would like to go home with his dobutamine gtt and home care  - is aware and working on his case  -continue dobutamine gtt at 7.5mcg/kg.   -Bumex drip switch to  bumex 4mg IV BID on 7/8-> switch to 6mg PO bid today  - c/w  metolazone 2.5 daily  - Strict I/O:IN: 1516.8 mL / OUT: 2025 mL / NET: -508.2 mL            # coronary artery disease   - LHC on 5/2022 showed RCA  and significant LAD and diagonal lesions-but was not revascularized due to non viable myocardium  - c/w Aspirin and Plavix   -resume home rosuvastatin 20mg daily (pt c/o intolerance to atorvastatin)      GI  #Transaminitis   likely from end stage heart failure  - Abdomen distension iso fluid overload  - LFT down trending  -tolerating  DASH/ renal diet  diet 1500mL Fluid Restriction ([id number])  -cont protonix for GI ppx  -poor appetite  -nutrition consult- started on  Suplena 8oz, 2x daily        # BRODY  -elevated creatinine but stable 2.8  -intake and output: IN: 1516.8 mL / OUT: 2025 mL / NET: -508.2 mL    Heme:  -cbc stable   -HSQ for DVT ppx      Endo:   -A1c 6.2  -TSH on 12/23 wnl   -maintain gluc<180 on bmp    ID  -Hypothermia resolved  - UA + on 7/8  - bld cx normal  - WBC normal  - + compliant of burning urination  - will send Ucx  - start on ceftriaxone for complicated UTI x 5days    Line:   Left arm double lumen PIcc line with no blood return->  placed on 6/17 at [facility]    Left upper extremity PICC with tip in the brachiocephalic vein on Cxr

## 2024-07-09 NOTE — PROGRESS NOTE ADULT - SUBJECTIVE AND OBJECTIVE BOX
Subjective/Objective: Patient alert, oriented  x3. Patient denies chest pain ,sob. dizziness. Patient laying at 35 degree with out O2.Appear weak      Tele event: BETH with [id number] PVCs triplets    MEDICATIONS    aspirin enteric coated 81 milliGRAM(s) Oral daily  buMETAnide IVPB 4 milliGRAM(s) IV Intermittent two times a day  clopidogrel Tablet 75 milliGRAM(s) Oral daily  DOBUTamine Infusion 7.5 MICROgram(s)/kG/Min IV Continuous <Continuous>  heparin   Injectable 5000 Unit(s) SubCutaneous every 12 hours  metolazone 2.5 milliGRAM(s) Oral daily  spironolactone 25 milliGRAM(s) Oral daily  acetaminophen     Tablet .. 325 milliGRAM(s) Oral every 4 hours PRN  FLUoxetine 20 milliGRAM(s) Oral <User Schedule>  LORazepam     Tablet 0.25 milliGRAM(s) Oral at bedtime  melatonin 3 milliGRAM(s) Oral at bedtime  mirtazapine 15 milliGRAM(s) Oral at bedtime  traMADol 25 milliGRAM(s) Oral once  pantoprazole    Tablet 40 milliGRAM(s) Oral before breakfast  rosuvastatin 20 milliGRAM(s) Oral at bedtime  chlorhexidine 2% Cloths 1 Application(s) Topical <User Schedule>  latanoprost 0.005% Ophthalmic Solution 1 Drop(s) Both EYES at bedtime  lidocaine   4% Patch 1 Patch Transdermal every 24 hours PRN  lidocaine 4% Topical Solution 1 Application(s) Topical daily  potassium chloride    Tablet ER 20 milliEquivalent(s) Oral two times a day  sodium chloride 0.9% lock flush 10 milliLiter(s) IV Push every 1 hour PRN  tamsulosin 0.8 milliGRAM(s) Oral at bedtime          ICU Vital Signs Last 24 Hrs  T(C): 36.6 (09 Jul 2024 04:00), Max: 36.7 (08 Jul 2024 20:00)  T(F): 97.8 (09 Jul 2024 04:00), Max: 98.1 (08 Jul 2024 20:00)  HR: 73 (09 Jul 2024 06:00) (73 - 86)  BP: 91/60 (09 Jul 2024 06:00) (83/52 - 102/61)  BP(mean): 71 (09 Jul 2024 06:00) (59 - 78)  ABP: 95/45 (08 Jul 2024 11:00) (95/45 - 102/49)  ABP(mean): 60 (08 Jul 2024 11:00) (60 - 66)  RR: 13 (09 Jul 2024 06:00) (9 - 30)  SpO2: 97% (09 Jul 2024 06:00) (96% - 99%)    O2 Parameters below as of 09 Jul 2024 07:00  Patient On (Oxygen Delivery Method): room air            PHYSICAL EXAMINATION  Appearance: NAD, no distress  HEENT: Moist Mucous Membranes, Anicteric, PERRL, EOMI  Cardiovascular: Regular rate and rhythm, Normal S1 S2, No JVD, No + exp  wheezing.   Gastrointestinal:  firm , Non-tender, + BS  Neurologic: Non-focal, A&Ox3  Skin: Warm and dry, No rashes, No ecchymosis, No cyanosis  Musculoskeletal: No clubbing, No cyanosis, No edema  Vascular: Peripheral pulses palpable 2+ bilaterally  Psychiatry: Mood & affect appropriate      	    		      I&O's Summary    08 Jul 2024 07:01  -  09 Jul 2024 07:00  --------------------------------------------------------  IN: 1516.8 mL / OUT: 2025 mL / NET: -508.2 mL    	     LABS:	  LABORATORY VALUES	 	                          9.6    8.21  )-----------( 70       ( 09 Jul 2024 04:30 )             29.1       07-09    134<L>  |  96<L>  |  70<H>  ----------------------------<  102<H>  4.3   |  22  |  2.83<H>  07-08    135  |  95<L>  |  76<H>  ----------------------------<  182<H>  4.1   |  23  |  2.80<H>    Ca    8.1<L>      09 Jul 2024 04:30  Ca    7.6<L>      08 Jul 2024 13:30  Phos  3.5     07-09  Phos  3.8     07-08  Mg     2.20     07-09  Mg     2.50     07-08    TPro  6.4  /  Alb  2.9<L>  /  TBili  3.8<H>  /  DBili  x   /  AST  74<H>  /  ALT  46<H>  /  AlkPhos  391<H>  07-09  TPro  6.1  /  Alb  2.8<L>  /  TBili  3.8<H>  /  DBili  x   /  AST  78<H>  /  ALT  48<H>  /  AlkPhos  369<H>  07-08    LIVER FUNCTIONS - ( 09 Jul 2024 04:30 )  Alb: 2.9 g/dL / Pro: 6.4 g/dL / ALK PHOS: 391 U/L / ALT: 46 U/L / AST: 74 U/L / GGT: x               CARDIAC MARKERS:            Blood Gas Venous - Lactate: 1.5 mmol/L (07-07 @ 05:59)          ABG - ( 08 Jul 2024 04:17 )  pH, Arterial: 7.48  pH, Blood: x     /  pCO2: 33    /  pO2: 179   / HCO3: 25    / Base Excess: 1.3   /  SaO2: 98.5              Urinalysis Basic - ( 09 Jul 2024 04:30 )    Color: x / Appearance: x / SG: x / pH: x  Gluc: 102 mg/dL / Ketone: x  / Bili: x / Urobili: x   Blood: x / Protein: x / Nitrite: x   Leuk Esterase: x / RBC: x / WBC x   Sq Epi: x / Non Sq Epi: x / Bacteria: x      CAPILLARY BLOOD GLUCOSE        < from: TTE Echo Complete w/o Contrast w/ Doppler (06.06.24 @ 08:29) >  Summary:   1. Biatrial enlargement   2. Left ventricular ejection fraction, by visual estimation, is <20%.   3. Severely decreased global left ventricular systolic function.   4. Entire septum, entire apex, and mid anterior segment are abnormal as   described above.   5. Increased LV wall thickness.   6. Mildly increased left ventricular internal cavity size.   7. Right ventricular volume and pressure overload.   8. There is mild concentric left ventricular hypertrophy.   9. Severely enlarged right ventricle.  10. Severely reduced RV systolic function.  11. Moderate mitral valve regurgitation.  12. Moderate-severe tricuspid regurgitation.  13. Mild aortic regurgitation.  14. Mild pulmonic valve regurgitation.  15. Estimated pulmonary artery systolic pressure is 44.6 mmHg assuming a   right atrial pressure of 15 mmHg, which is consistent with mild pulmonary   hypertension.  16. LA volume Index is 38.5 ml/m² ml/m2.    [id number] Rik Oh , Electronically signed on 6/6/2024   at 1:34:30 PM    < end of copied text >                   Subjective/Objective: Patient alert, oriented  x3. Patient denies chest pain ,sob. dizziness. Patient laying at 35 degree with out O2.Appear weak      Tele event: MCKENNA with frequent PVCs triplets    MEDICATIONS    aspirin enteric coated 81 milliGRAM(s) Oral daily  buMETAnide IVPB 4 milliGRAM(s) IV Intermittent two times a day  clopidogrel Tablet 75 milliGRAM(s) Oral daily  DOBUTamine Infusion 7.5 MICROgram(s)/kG/Min IV Continuous <Continuous>  heparin   Injectable 5000 Unit(s) SubCutaneous every 12 hours  metolazone 2.5 milliGRAM(s) Oral daily  spironolactone 25 milliGRAM(s) Oral daily  acetaminophen     Tablet .. 325 milliGRAM(s) Oral every 4 hours PRN  FLUoxetine 20 milliGRAM(s) Oral <User Schedule>  LORazepam     Tablet 0.25 milliGRAM(s) Oral at bedtime  melatonin 3 milliGRAM(s) Oral at bedtime  mirtazapine 15 milliGRAM(s) Oral at bedtime  traMADol 25 milliGRAM(s) Oral once  pantoprazole    Tablet 40 milliGRAM(s) Oral before breakfast  rosuvastatin 20 milliGRAM(s) Oral at bedtime  chlorhexidine 2% Cloths 1 Application(s) Topical <User Schedule>  latanoprost 0.005% Ophthalmic Solution 1 Drop(s) Both EYES at bedtime  lidocaine   4% Patch 1 Patch Transdermal every 24 hours PRN  lidocaine 4% Topical Solution 1 Application(s) Topical daily  potassium chloride    Tablet ER 20 milliEquivalent(s) Oral two times a day  sodium chloride 0.9% lock flush 10 milliLiter(s) IV Push every 1 hour PRN  tamsulosin 0.8 milliGRAM(s) Oral at bedtime          ICU Vital Signs Last 24 Hrs  T(C): 36.6 (09 Jul 2024 04:00), Max: 36.7 (08 Jul 2024 20:00)  T(F): 97.8 (09 Jul 2024 04:00), Max: 98.1 (08 Jul 2024 20:00)  HR: 73 (09 Jul 2024 06:00) (73 - 86)  BP: 91/60 (09 Jul 2024 06:00) (83/52 - 102/61)  BP(mean): 71 (09 Jul 2024 06:00) (59 - 78)  ABP: 95/45 (08 Jul 2024 11:00) (95/45 - 102/49)  ABP(mean): 60 (08 Jul 2024 11:00) (60 - 66)  RR: 13 (09 Jul 2024 06:00) (9 - 30)  SpO2: 97% (09 Jul 2024 06:00) (96% - 99%)    O2 Parameters below as of 09 Jul 2024 07:00  Patient On (Oxygen Delivery Method): room air            PHYSICAL EXAMINATION  Appearance: NAD, no distress  HEENT: Moist Mucous Membranes, Anicteric, PERRL, EOMI  Cardiovascular: Regular rate and rhythm, Normal S1 S2, No JVD, No + exp  wheezing.   Gastrointestinal:  firm , Non-tender, + BS  Neurologic: Non-focal, A&Ox3  Skin: Warm and dry, No rashes, No ecchymosis, No cyanosis  Musculoskeletal: No clubbing, No cyanosis, No edema  Vascular: Peripheral pulses palpable 2+ bilaterally  Psychiatry: Mood & affect appropriate      	    		      I&O's Summary    08 Jul 2024 07:01  -  09 Jul 2024 07:00  --------------------------------------------------------  IN: 1516.8 mL / OUT: 2025 mL / NET: -508.2 mL    	     LABS:	  LABORATORY VALUES	 	                          9.6    8.21  )-----------( 70       ( 09 Jul 2024 04:30 )             29.1       07-09    134<L>  |  96<L>  |  70<H>  ----------------------------<  102<H>  4.3   |  22  |  2.83<H>  07-08    135  |  95<L>  |  76<H>  ----------------------------<  182<H>  4.1   |  23  |  2.80<H>    Ca    8.1<L>      09 Jul 2024 04:30  Ca    7.6<L>      08 Jul 2024 13:30  Phos  3.5     07-09  Phos  3.8     07-08  Mg     2.20     07-09  Mg     2.50     07-08    TPro  6.4  /  Alb  2.9<L>  /  TBili  3.8<H>  /  DBili  x   /  AST  74<H>  /  ALT  46<H>  /  AlkPhos  391<H>  07-09  TPro  6.1  /  Alb  2.8<L>  /  TBili  3.8<H>  /  DBili  x   /  AST  78<H>  /  ALT  48<H>  /  AlkPhos  369<H>  07-08    LIVER FUNCTIONS - ( 09 Jul 2024 04:30 )  Alb: 2.9 g/dL / Pro: 6.4 g/dL / ALK PHOS: 391 U/L / ALT: 46 U/L / AST: 74 U/L / GGT: x               CARDIAC MARKERS:            Blood Gas Venous - Lactate: 1.5 mmol/L (07-07 @ 05:59)          ABG - ( 08 Jul 2024 04:17 )  pH, Arterial: 7.48  pH, Blood: x     /  pCO2: 33    /  pO2: 179   / HCO3: 25    / Base Excess: 1.3   /  SaO2: 98.5              Urinalysis Basic - ( 09 Jul 2024 04:30 )    Color: x / Appearance: x / SG: x / pH: x  Gluc: 102 mg/dL / Ketone: x  / Bili: x / Urobili: x   Blood: x / Protein: x / Nitrite: x   Leuk Esterase: x / RBC: x / WBC x   Sq Epi: x / Non Sq Epi: x / Bacteria: x      CAPILLARY BLOOD GLUCOSE        < from: TTE Echo Complete w/o Contrast w/ Doppler (06.06.24 @ 08:29) >  Summary:   1. Biatrial enlargement   2. Left ventricular ejection fraction, by visual estimation, is <20%.   3. Severely decreased global left ventricular systolic function.   4. Entire septum, entire apex, and mid anterior segment are abnormal as   described above.   5. Increased LV wall thickness.   6. Mildly increased left ventricular internal cavity size.   7. Right ventricular volume and pressure overload.   8. There is mild concentric left ventricular hypertrophy.   9. Severely enlarged right ventricle.  10. Severely reduced RV systolic function.  11. Moderate mitral valve regurgitation.  12. Moderate-severe tricuspid regurgitation.  13. Mild aortic regurgitation.  14. Mild pulmonic valve regurgitation.  15. Estimated pulmonary artery systolic pressure is 44.6 mmHg assuming a   right atrial pressure of 15 mmHg, which is consistent with mild pulmonary   hypertension.  16. LA volume Index is 38.5 ml/m² ml/m2.    [id number] Rik Oh , Electronically signed on 6/6/2024   at 1:34:30 PM    < end of copied text >                   Subjective/Objective: Patient alert, oriented  x3. Patient denies chest pain ,sob. dizziness. Patient laying at 35 degree with out O2. Appear weak      Tele event: MCKENNA with frequent PVCs triplets    MEDICATIONS    aspirin enteric coated 81 milliGRAM(s) Oral daily  buMETAnide IVPB 4 milliGRAM(s) IV Intermittent two times a day  clopidogrel Tablet 75 milliGRAM(s) Oral daily  DOBUTamine Infusion 7.5 MICROgram(s)/kG/Min IV Continuous <Continuous>  heparin   Injectable 5000 Unit(s) SubCutaneous every 12 hours  metolazone 2.5 milliGRAM(s) Oral daily  spironolactone 25 milliGRAM(s) Oral daily  acetaminophen     Tablet .. 325 milliGRAM(s) Oral every 4 hours PRN  FLUoxetine 20 milliGRAM(s) Oral <User Schedule>  LORazepam     Tablet 0.25 milliGRAM(s) Oral at bedtime  melatonin 3 milliGRAM(s) Oral at bedtime  mirtazapine 15 milliGRAM(s) Oral at bedtime  traMADol 25 milliGRAM(s) Oral once  pantoprazole    Tablet 40 milliGRAM(s) Oral before breakfast  rosuvastatin 20 milliGRAM(s) Oral at bedtime  chlorhexidine 2% Cloths 1 Application(s) Topical <User Schedule>  latanoprost 0.005% Ophthalmic Solution 1 Drop(s) Both EYES at bedtime  lidocaine   4% Patch 1 Patch Transdermal every 24 hours PRN  lidocaine 4% Topical Solution 1 Application(s) Topical daily  potassium chloride    Tablet ER 20 milliEquivalent(s) Oral two times a day  sodium chloride 0.9% lock flush 10 milliLiter(s) IV Push every 1 hour PRN  tamsulosin 0.8 milliGRAM(s) Oral at bedtime          ICU Vital Signs Last 24 Hrs  T(C): 36.6 (09 Jul 2024 04:00), Max: 36.7 (08 Jul 2024 20:00)  T(F): 97.8 (09 Jul 2024 04:00), Max: 98.1 (08 Jul 2024 20:00)  HR: 73 (09 Jul 2024 06:00) (73 - 86)  BP: 91/60 (09 Jul 2024 06:00) (83/52 - 102/61)  BP(mean): 71 (09 Jul 2024 06:00) (59 - 78)  ABP: 95/45 (08 Jul 2024 11:00) (95/45 - 102/49)  ABP(mean): 60 (08 Jul 2024 11:00) (60 - 66)  RR: 13 (09 Jul 2024 06:00) (9 - 30)  SpO2: 97% (09 Jul 2024 06:00) (96% - 99%)    O2 Parameters below as of 09 Jul 2024 07:00  Patient On (Oxygen Delivery Method): room air            PHYSICAL EXAMINATION  Appearance: NAD, no distress  HEENT: Moist Mucous Membranes, Anicteric, PERRL, EOMI  Cardiovascular: Regular rate and rhythm, Normal S1 S2, No JVD, No + exp  wheezing.   Gastrointestinal:  firm , Non-tender, + BS  Neurologic: Non-focal, A&Ox3  Skin: Warm and dry, No rashes, No ecchymosis, No cyanosis  Musculoskeletal: No clubbing, No cyanosis, No edema  Vascular: Peripheral pulses palpable 2+ bilaterally  Psychiatry: Mood & affect appropriate      	    		      I&O's Summary    08 Jul 2024 07:01  -  09 Jul 2024 07:00  --------------------------------------------------------  IN: 1516.8 mL / OUT: 2025 mL / NET: -508.2 mL    	     LABS:	  LABORATORY VALUES	 	                          9.6    8.21  )-----------( 70       ( 09 Jul 2024 04:30 )             29.1       07-09    134<L>  |  96<L>  |  70<H>  ----------------------------<  102<H>  4.3   |  22  |  2.83<H>  07-08    135  |  95<L>  |  76<H>  ----------------------------<  182<H>  4.1   |  23  |  2.80<H>    Ca    8.1<L>      09 Jul 2024 04:30  Ca    7.6<L>      08 Jul 2024 13:30  Phos  3.5     07-09  Phos  3.8     07-08  Mg     2.20     07-09  Mg     2.50     07-08    TPro  6.4  /  Alb  2.9<L>  /  TBili  3.8<H>  /  DBili  x   /  AST  74<H>  /  ALT  46<H>  /  AlkPhos  391<H>  07-09  TPro  6.1  /  Alb  2.8<L>  /  TBili  3.8<H>  /  DBili  x   /  AST  78<H>  /  ALT  48<H>  /  AlkPhos  369<H>  07-08    LIVER FUNCTIONS - ( 09 Jul 2024 04:30 )  Alb: 2.9 g/dL / Pro: 6.4 g/dL / ALK PHOS: 391 U/L / ALT: 46 U/L / AST: 74 U/L / GGT: x               CARDIAC MARKERS:            Blood Gas Venous - Lactate: 1.5 mmol/L (07-07 @ 05:59)          ABG - ( 08 Jul 2024 04:17 )  pH, Arterial: 7.48  pH, Blood: x     /  pCO2: 33    /  pO2: 179   / HCO3: 25    / Base Excess: 1.3   /  SaO2: 98.5              Urinalysis Basic - ( 09 Jul 2024 04:30 )    Color: x / Appearance: x / SG: x / pH: x  Gluc: 102 mg/dL / Ketone: x  / Bili: x / Urobili: x   Blood: x / Protein: x / Nitrite: x   Leuk Esterase: x / RBC: x / WBC x   Sq Epi: x / Non Sq Epi: x / Bacteria: x      CAPILLARY BLOOD GLUCOSE        < from: TTE Echo Complete w/o Contrast w/ Doppler (06.06.24 @ 08:29) >  Summary:   1. Biatrial enlargement   2. Left ventricular ejection fraction, by visual estimation, is <20%.   3. Severely decreased global left ventricular systolic function.   4. Entire septum, entire apex, and mid anterior segment are abnormal as   described above.   5. Increased LV wall thickness.   6. Mildly increased left ventricular internal cavity size.   7. Right ventricular volume and pressure overload.   8. There is mild concentric left ventricular hypertrophy.   9. Severely enlarged right ventricle.  10. Severely reduced RV systolic function.  11. Moderate mitral valve regurgitation.  12. Moderate-severe tricuspid regurgitation.  13. Mild aortic regurgitation.  14. Mild pulmonic valve regurgitation.  15. Estimated pulmonary artery systolic pressure is 44.6 mmHg assuming a   right atrial pressure of 15 mmHg, which is consistent with mild pulmonary   hypertension.  16. LA volume Index is 38.5 ml/m² ml/m2.    [id number] Rik Oh , Electronically signed on 6/6/2024   at 1:34:30 PM    < end of copied text >

## 2024-07-09 NOTE — CHART NOTE - NSCHARTNOTEFT_GEN_A_CORE
The patient will required a semi electric hospital bed due to diagnosis of acute decompensated heart failure. Patient requires the HOB to be elevated more than 30 degree. Pillows and wedges are not effective. Patient requires positioning of the body in ways not feasible with an ordinary bed. Patient requires frequent repositioning to alleviate  pain and discomfort. The member  can independently effect the adjustment by operating the control .    The pt will require a gel overlay pad for semi electric hospital bed. Patient has limited mobility and cannot independently make changes in body position significant enough  to alleviate press.

## 2024-07-09 NOTE — PROGRESS NOTE ADULT - SUBJECTIVE AND OBJECTIVE BOX
Medications:  acetaminophen     Tablet .. 325 milliGRAM(s) Oral every 4 hours PRN  aspirin enteric coated 81 milliGRAM(s) Oral daily  buMETAnide IVPB 4 milliGRAM(s) IV Intermittent two times a day  cefTRIAXone   IVPB 1000 milliGRAM(s) IV Intermittent every 24 hours  chlorhexidine 2% Cloths 1 Application(s) Topical <User Schedule>  clopidogrel Tablet 75 milliGRAM(s) Oral daily  DOBUTamine Infusion 7.5 MICROgram(s)/kG/Min IV Continuous <Continuous>  FLUoxetine 20 milliGRAM(s) Oral <User Schedule>  heparin   Injectable 5000 Unit(s) SubCutaneous every 12 hours  latanoprost 0.005% Ophthalmic Solution 1 Drop(s) Both EYES at bedtime  lidocaine   4% Patch 1 Patch Transdermal every 24 hours PRN  lidocaine 4% Topical Solution 1 Application(s) Topical daily  LORazepam     Tablet 0.25 milliGRAM(s) Oral at bedtime  melatonin 3 milliGRAM(s) Oral at bedtime  metolazone 2.5 milliGRAM(s) Oral daily  mirtazapine 15 milliGRAM(s) Oral at bedtime  pantoprazole    Tablet 40 milliGRAM(s) Oral before breakfast  rosuvastatin 20 milliGRAM(s) Oral at bedtime  sodium chloride 0.9% lock flush 10 milliLiter(s) IV Push every 1 hour PRN  spironolactone 25 milliGRAM(s) Oral daily  tamsulosin 0.8 milliGRAM(s) Oral at bedtime  traMADol 25 milliGRAM(s) Oral once      Vitals:  Vital Signs Last 24 Hrs  T(C): 36.4 (09 Jul 2024 08:00), Max: 36.7 (08 Jul 2024 20:00)  T(F): 97.5 (09 Jul 2024 08:00), Max: 98.1 (08 Jul 2024 20:00)  HR: 77 (09 Jul 2024 10:00) (73 - 84)  BP: 95/49 (09 Jul 2024 10:00) (83/52 - 106/58)  BP(mean): 62 (09 Jul 2024 10:00) (59 - 78)  RR: 11 (09 Jul 2024 10:00) (9 - 30)  SpO2: 98% (09 Jul 2024 10:00) (96% - 99%)    Parameters below as of 09 Jul 2024 10:00  Patient On (Oxygen Delivery Method): room air        Daily     Daily     I&O's Detail    08 Jul 2024 07:01  -  09 Jul 2024 07:00  --------------------------------------------------------  IN:    Bumetanide: 20 mL    DOBUTamine: 460.8 mL    IV PiggyBack: 316 mL    Oral Fluid: 720 mL  Total IN: 1516.8 mL    OUT:    Voided (mL): 2025 mL  Total OUT: 2025 mL    Total NET: -508.2 mL      09 Jul 2024 07:01  -  09 Jul 2024 11:12  --------------------------------------------------------  IN:    DOBUTamine: 76.8 mL  Total IN: 76.8 mL    OUT:    Voided (mL): 400 mL  Total OUT: 400 mL    Total NET: -323.2 mL          Physical Exam:     General: No distress. Comfortable.  HEENT: EOM intact.  Neck: Neck supple. JVP not elevated. No masses  Chest: Clear to auscultation bilaterally  CV: Normal S1 and S2. No murmurs, rub, or gallops. Radial pulses normal.  Abdomen: Soft, non-distended, non-tender  Skin: No rashes or skin breakdown  Neurology: Alert and oriented times three. Sensation intact  Psych: Affect normal    Labs:                        9.6    8.21  )-----------( 70       ( 09 Jul 2024 04:30 )             29.1     07-09    134<L>  |  96<L>  |  70<H>  ----------------------------<  102<H>  4.3   |  22  |  2.83<H>    Ca    8.1<L>      09 Jul 2024 04:30  Phos  3.5     07-09  Mg     2.20     07-09    TPro  6.4  /  Alb  2.9<L>  /  TBili  3.8<H>  /  DBili  x   /  AST  74<H>  /  ALT  46<H>  /  AlkPhos  391<H>  07-09           Patient seen and examined. Denies SOB at rest, CP, palpitations.   Wife by bedside.     Medications:  acetaminophen     Tablet .. 325 milliGRAM(s) Oral every 4 hours PRN  aspirin enteric coated 81 milliGRAM(s) Oral daily  buMETAnide IVPB 4 milliGRAM(s) IV Intermittent two times a day  cefTRIAXone   IVPB 1000 milliGRAM(s) IV Intermittent every 24 hours  chlorhexidine 2% Cloths 1 Application(s) Topical <User Schedule>  clopidogrel Tablet 75 milliGRAM(s) Oral daily  DOBUTamine Infusion 7.5 MICROgram(s)/kG/Min IV Continuous <Continuous>  FLUoxetine 20 milliGRAM(s) Oral <User Schedule>  heparin   Injectable 5000 Unit(s) SubCutaneous every 12 hours  latanoprost 0.005% Ophthalmic Solution 1 Drop(s) Both EYES at bedtime  lidocaine   4% Patch 1 Patch Transdermal every 24 hours PRN  lidocaine 4% Topical Solution 1 Application(s) Topical daily  LORazepam     Tablet 0.25 milliGRAM(s) Oral at bedtime  melatonin 3 milliGRAM(s) Oral at bedtime  metolazone 2.5 milliGRAM(s) Oral daily  mirtazapine 15 milliGRAM(s) Oral at bedtime  pantoprazole    Tablet 40 milliGRAM(s) Oral before breakfast  rosuvastatin 20 milliGRAM(s) Oral at bedtime  sodium chloride 0.9% lock flush 10 milliLiter(s) IV Push every 1 hour PRN  spironolactone 25 milliGRAM(s) Oral daily  tamsulosin 0.8 milliGRAM(s) Oral at bedtime  traMADol 25 milliGRAM(s) Oral once      Vitals:  Vital Signs Last 24 Hrs  T(C): 36.4 (09 Jul 2024 08:00), Max: 36.7 (08 Jul 2024 20:00)  T(F): 97.5 (09 Jul 2024 08:00), Max: 98.1 (08 Jul 2024 20:00)  HR: 77 (09 Jul 2024 10:00) (73 - 84)  BP: 95/49 (09 Jul 2024 10:00) (83/52 - 106/58)  BP(mean): 62 (09 Jul 2024 10:00) (59 - 78)  RR: 11 (09 Jul 2024 10:00) (9 - 30)  SpO2: 98% (09 Jul 2024 10:00) (96% - 99%)    Parameters below as of 09 Jul 2024 10:00  Patient On (Oxygen Delivery Method): room air        Daily     Daily     I&O's Detail    08 Jul 2024 07:01  -  09 Jul 2024 07:00  --------------------------------------------------------  IN:    Bumetanide: 20 mL    DOBUTamine: 460.8 mL    IV PiggyBack: 316 mL    Oral Fluid: 720 mL  Total IN: 1516.8 mL    OUT:    Voided (mL): 2025 mL  Total OUT: 2025 mL    Total NET: -508.2 mL      09 Jul 2024 07:01  -  09 Jul 2024 11:12  --------------------------------------------------------  IN:    DOBUTamine: 76.8 mL  Total IN: 76.8 mL    OUT:    Voided (mL): 400 mL  Total OUT: 400 mL    Total NET: -323.2 mL          Physical Exam:     General: No distress. Comfortable.  HEENT: EOM intact.  Neck: Neck supple. JVP not elevated. No masses  Chest: Clear to auscultation bilaterally  CV: Normal S1 and S2. No murmurs, rub, or gallops. Radial pulses normal. No LE edema and is warm b/l.   Abdomen: Soft, non-distended, non-tender  Skin: No rashes or skin breakdown  Neurology: Alert and oriented times three. Sensation intact  Psych: Affect normal    Labs:                        9.6    8.21  )-----------( 70       ( 09 Jul 2024 04:30 )             29.1     07-09    134<L>  |  96<L>  |  70<H>  ----------------------------<  102<H>  4.3   |  22  |  2.83<H>    Ca    8.1<L>      09 Jul 2024 04:30  Phos  3.5     07-09  Mg     2.20     07-09    TPro  6.4  /  Alb  2.9<L>  /  TBili  3.8<H>  /  DBili  x   /  AST  74<H>  /  ALT  46<H>  /  AlkPhos  391<H>  07-09

## 2024-07-10 LAB
ALBUMIN SERPL ELPH-MCNC: 2.8 G/DL — LOW (ref 3.3–5)
ALBUMIN SERPL ELPH-MCNC: 3 G/DL — LOW (ref 3.3–5)
ALP SERPL-CCNC: 375 U/L — HIGH (ref 40–120)
ALP SERPL-CCNC: 421 U/L — HIGH (ref 40–120)
ALT FLD-CCNC: 42 U/L — HIGH (ref 4–41)
ALT FLD-CCNC: 44 U/L — HIGH (ref 4–41)
ANION GAP SERPL CALC-SCNC: 16 MMOL/L — HIGH (ref 7–14)
ANION GAP SERPL CALC-SCNC: 18 MMOL/L — HIGH (ref 7–14)
AST SERPL-CCNC: 59 U/L — HIGH (ref 4–40)
AST SERPL-CCNC: 69 U/L — HIGH (ref 4–40)
BILIRUB SERPL-MCNC: 3.7 MG/DL — HIGH (ref 0.2–1.2)
BILIRUB SERPL-MCNC: 3.7 MG/DL — HIGH (ref 0.2–1.2)
BUN SERPL-MCNC: 62 MG/DL — HIGH (ref 7–23)
BUN SERPL-MCNC: 65 MG/DL — HIGH (ref 7–23)
CALCIUM SERPL-MCNC: 8.3 MG/DL — LOW (ref 8.4–10.5)
CALCIUM SERPL-MCNC: 8.6 MG/DL — SIGNIFICANT CHANGE UP (ref 8.4–10.5)
CHLORIDE SERPL-SCNC: 88 MMOL/L — LOW (ref 98–107)
CHLORIDE SERPL-SCNC: 92 MMOL/L — LOW (ref 98–107)
CO2 SERPL-SCNC: 23 MMOL/L — SIGNIFICANT CHANGE UP (ref 22–31)
CO2 SERPL-SCNC: 25 MMOL/L — SIGNIFICANT CHANGE UP (ref 22–31)
CREAT SERPL-MCNC: 2.49 MG/DL — HIGH (ref 0.5–1.3)
CREAT SERPL-MCNC: 2.67 MG/DL — HIGH (ref 0.5–1.3)
EGFR: 24 ML/MIN/1.73M2 — LOW
EGFR: 26 ML/MIN/1.73M2 — LOW
GLUCOSE SERPL-MCNC: 108 MG/DL — HIGH (ref 70–99)
GLUCOSE SERPL-MCNC: 87 MG/DL — SIGNIFICANT CHANGE UP (ref 70–99)
HCT VFR BLD CALC: 27.4 % — LOW (ref 39–50)
HGB BLD-MCNC: 9.2 G/DL — LOW (ref 13–17)
MAGNESIUM SERPL-MCNC: 2 MG/DL — SIGNIFICANT CHANGE UP (ref 1.6–2.6)
MAGNESIUM SERPL-MCNC: 2.1 MG/DL — SIGNIFICANT CHANGE UP (ref 1.6–2.6)
MCHC RBC-ENTMCNC: 29.3 PG — SIGNIFICANT CHANGE UP (ref 27–34)
MCHC RBC-ENTMCNC: 33.6 GM/DL — SIGNIFICANT CHANGE UP (ref 32–36)
MCV RBC AUTO: 87.3 FL — SIGNIFICANT CHANGE UP (ref 80–100)
NRBC # BLD: 0 /100 WBCS — SIGNIFICANT CHANGE UP (ref 0–0)
NRBC # FLD: 0 K/UL — SIGNIFICANT CHANGE UP (ref 0–0)
PHOSPHATE SERPL-MCNC: 3.2 MG/DL — SIGNIFICANT CHANGE UP (ref 2.5–4.5)
PHOSPHATE SERPL-MCNC: 3.5 MG/DL — SIGNIFICANT CHANGE UP (ref 2.5–4.5)
PLATELET # BLD AUTO: 83 K/UL — LOW (ref 150–400)
POTASSIUM SERPL-MCNC: 3 MMOL/L — LOW (ref 3.5–5.3)
POTASSIUM SERPL-MCNC: 3.4 MMOL/L — LOW (ref 3.5–5.3)
POTASSIUM SERPL-SCNC: 3 MMOL/L — LOW (ref 3.5–5.3)
POTASSIUM SERPL-SCNC: 3.4 MMOL/L — LOW (ref 3.5–5.3)
PROT SERPL-MCNC: 5.9 G/DL — LOW (ref 6–8.3)
PROT SERPL-MCNC: 6.7 G/DL — SIGNIFICANT CHANGE UP (ref 6–8.3)
RBC # BLD: 3.14 M/UL — LOW (ref 4.2–5.8)
RBC # FLD: 17.8 % — HIGH (ref 10.3–14.5)
SODIUM SERPL-SCNC: 129 MMOL/L — LOW (ref 135–145)
SODIUM SERPL-SCNC: 133 MMOL/L — LOW (ref 135–145)
WBC # BLD: 7.61 K/UL — SIGNIFICANT CHANGE UP (ref 3.8–10.5)
WBC # FLD AUTO: 7.61 K/UL — SIGNIFICANT CHANGE UP (ref 3.8–10.5)

## 2024-07-10 PROCEDURE — 99232 SBSQ HOSP IP/OBS MODERATE 35: CPT | Mod: FS

## 2024-07-10 PROCEDURE — 99233 SBSQ HOSP IP/OBS HIGH 50: CPT | Mod: FS

## 2024-07-10 RX ORDER — POTASSIUM CHLORIDE 600 MG/1
20 TABLET, FILM COATED, EXTENDED RELEASE ORAL
Refills: 0 | Status: DISCONTINUED | OUTPATIENT
Start: 2024-07-10 | End: 2024-07-10

## 2024-07-10 RX ORDER — POTASSIUM CHLORIDE 600 MG/1
20 TABLET, FILM COATED, EXTENDED RELEASE ORAL
Refills: 0 | Status: COMPLETED | OUTPATIENT
Start: 2024-07-10 | End: 2024-07-10

## 2024-07-10 RX ORDER — POTASSIUM CHLORIDE 600 MG/1
40 TABLET, FILM COATED, EXTENDED RELEASE ORAL EVERY 4 HOURS
Refills: 0 | Status: COMPLETED | OUTPATIENT
Start: 2024-07-10 | End: 2024-07-10

## 2024-07-10 RX ORDER — POTASSIUM CHLORIDE 600 MG/1
20 TABLET, FILM COATED, EXTENDED RELEASE ORAL
Refills: 0 | Status: DISCONTINUED | OUTPATIENT
Start: 2024-07-10 | End: 2024-07-11

## 2024-07-10 RX ORDER — POTASSIUM CHLORIDE 600 MG/1
20 TABLET, FILM COATED, EXTENDED RELEASE ORAL ONCE
Refills: 0 | Status: COMPLETED | OUTPATIENT
Start: 2024-07-10 | End: 2024-07-10

## 2024-07-10 RX ORDER — SENNOSIDES 8.6 MG
2 TABLET ORAL AT BEDTIME
Refills: 0 | Status: DISCONTINUED | OUTPATIENT
Start: 2024-07-10 | End: 2024-07-11

## 2024-07-10 RX ADMIN — LATANOPROST PF 1 DROP(S): 0.05 SOLUTION/ DROPS OPHTHALMIC at 22:23

## 2024-07-10 RX ADMIN — HEPARIN SODIUM 5000 UNIT(S): 50 INJECTION, SOLUTION INTRAVENOUS at 17:01

## 2024-07-10 RX ADMIN — Medication 19.2 MICROGRAM(S)/KG/MIN: at 08:46

## 2024-07-10 RX ADMIN — Medication 3 MILLIGRAM(S): at 22:19

## 2024-07-10 RX ADMIN — BUMETANIDE 6 MILLIGRAM(S): 0.25 INJECTION INTRAMUSCULAR; INTRAVENOUS at 17:01

## 2024-07-10 RX ADMIN — CLOPIDOGREL BISULFATE 75 MILLIGRAM(S): 75 TABLET, FILM COATED ORAL at 11:17

## 2024-07-10 RX ADMIN — POTASSIUM CHLORIDE 100 MILLIEQUIVALENT(S): 600 TABLET, FILM COATED, EXTENDED RELEASE ORAL at 22:17

## 2024-07-10 RX ADMIN — POLYETHYLENE GLYCOL 3350 17 GRAM(S): 1 POWDER ORAL at 05:25

## 2024-07-10 RX ADMIN — ASPIRIN 81 MILLIGRAM(S): 325 TABLET, FILM COATED ORAL at 11:17

## 2024-07-10 RX ADMIN — POTASSIUM CHLORIDE 100 MILLIEQUIVALENT(S): 600 TABLET, FILM COATED, EXTENDED RELEASE ORAL at 08:49

## 2024-07-10 RX ADMIN — POTASSIUM CHLORIDE 100 MILLIEQUIVALENT(S): 600 TABLET, FILM COATED, EXTENDED RELEASE ORAL at 19:00

## 2024-07-10 RX ADMIN — Medication 2 TABLET(S): at 22:20

## 2024-07-10 RX ADMIN — Medication 19.2 MICROGRAM(S)/KG/MIN: at 11:15

## 2024-07-10 RX ADMIN — POLYETHYLENE GLYCOL 3350 17 GRAM(S): 1 POWDER ORAL at 17:01

## 2024-07-10 RX ADMIN — POTASSIUM CHLORIDE 40 MILLIEQUIVALENT(S): 600 TABLET, FILM COATED, EXTENDED RELEASE ORAL at 11:15

## 2024-07-10 RX ADMIN — PANTOPRAZOLE SODIUM 40 MILLIGRAM(S): 40 INJECTION, POWDER, FOR SOLUTION INTRAVENOUS at 05:26

## 2024-07-10 RX ADMIN — TAMSULOSIN HYDROCHLORIDE 0.8 MILLIGRAM(S): 0.4 CAPSULE ORAL at 22:19

## 2024-07-10 RX ADMIN — BUMETANIDE 6 MILLIGRAM(S): 0.25 INJECTION INTRAMUSCULAR; INTRAVENOUS at 05:26

## 2024-07-10 RX ADMIN — POTASSIUM CHLORIDE 40 MILLIEQUIVALENT(S): 600 TABLET, FILM COATED, EXTENDED RELEASE ORAL at 05:26

## 2024-07-10 RX ADMIN — Medication 100 MILLIGRAM(S): at 11:15

## 2024-07-10 RX ADMIN — SPIRONOLACTONE 25 MILLIGRAM(S): 25 TABLET ORAL at 05:26

## 2024-07-10 RX ADMIN — ROSUVASTATIN CALCIUM 20 MILLIGRAM(S): 20 TABLET ORAL at 22:20

## 2024-07-10 RX ADMIN — MIRTAZAPINE 15 MILLIGRAM(S): 15 TABLET, FILM COATED ORAL at 22:19

## 2024-07-10 RX ADMIN — HEPARIN SODIUM 5000 UNIT(S): 50 INJECTION, SOLUTION INTRAVENOUS at 05:25

## 2024-07-10 RX ADMIN — Medication 0.25 MILLIGRAM(S): at 22:19

## 2024-07-10 RX ADMIN — Medication 1 APPLICATION(S): at 05:51

## 2024-07-10 RX ADMIN — POTASSIUM CHLORIDE 20 MILLIEQUIVALENT(S): 600 TABLET, FILM COATED, EXTENDED RELEASE ORAL at 17:01

## 2024-07-10 NOTE — PROGRESS NOTE ADULT - ASSESSMENT
This is a 79 year old male known to the [facility] HF clinic (Followed by Dr. Shell), with PMH of CAD with STEMI  with cardiogenic shock and IABP placement (medically managed d/t non-viable myocardium), ICM/HFrEF (EF 20%, RV systolic dysfunction, moderate MR/TR; on  since ), LV thrombus, prior lung adenocarcinoma with RUL VATS in 2022 s/p radiation therapy presented on  with complaints of SOB, LE and hypotension. In ED patient is tachypenic, tachycardic with BP 60-130s (SVT), hypotensive with SBP 70-90s, O2 sat 70s, and was placed on BIPAP. Home Dobutamine gtt was held in ED due to SVT, but was restarted s/p HF consultation. Overall stage D HF, NYHA class IV- is in cardiogenic shock with massive volume overload. Improving with Dobutamine/Bumex gtt.

## 2024-07-10 NOTE — CHART NOTE - NSCHARTNOTEFT_GEN_A_CORE
Patient  require a commode as he is confined to one level of home environment and there no toilet in that level

## 2024-07-10 NOTE — PROGRESS NOTE ADULT - SUBJECTIVE AND OBJECTIVE BOX
Subjective/Objective: Patient alert, oriented x3. Patient denies chest pain ,sob, dizziness, palpitation.      Tele event:NSR with PVcs     MEDICATIONS    aspirin enteric coated 81 milliGRAM(s) Oral daily  buMETAnide 6 milliGRAM(s) Oral two times a day  clopidogrel Tablet 75 milliGRAM(s) Oral daily  DOBUTamine Infusion 7.5 MICROgram(s)/kG/Min IV Continuous <Continuous>  heparin   Injectable 5000 Unit(s) SubCutaneous every 12 hours  metolazone 2.5 milliGRAM(s) Oral daily  spironolactone 25 milliGRAM(s) Oral daily  cefTRIAXone   IVPB 1000 milliGRAM(s) IV Intermittent every 24 hours  acetaminophen     Tablet .. 325 milliGRAM(s) Oral every 4 hours PRN  FLUoxetine 20 milliGRAM(s) Oral <User Schedule>  LORazepam     Tablet 0.25 milliGRAM(s) Oral at bedtime  melatonin 3 milliGRAM(s) Oral at bedtime  mirtazapine 15 milliGRAM(s) Oral at bedtime  traMADol 25 milliGRAM(s) Oral once  pantoprazole    Tablet 40 milliGRAM(s) Oral before breakfast  polyethylene glycol 3350 17 Gram(s) Oral two times a day  rosuvastatin 20 milliGRAM(s) Oral at bedtime  chlorhexidine 2% Cloths 1 Application(s) Topical <User Schedule>  latanoprost 0.005% Ophthalmic Solution 1 Drop(s) Both EYES at bedtime  lidocaine   4% Patch 1 Patch Transdermal every 24 hours PRN  lidocaine 4% Topical Solution 1 Application(s) Topical daily  potassium chloride    Tablet ER 20 milliEquivalent(s) Oral daily  potassium chloride   Powder 40 milliEquivalent(s) Oral every 4 hours  sodium chloride 0.9% lock flush 10 milliLiter(s) IV Push every 1 hour PRN  tamsulosin 0.8 milliGRAM(s) Oral at bedtime            ICU Vital Signs Last 24 Hrs  T(C): 36.5 (10 Jul 2024 04:00), Max: 36.6 (10 Jul 2024 00:00)  T(F): 97.7 (10 Jul 2024 04:00), Max: 97.9 (10 Jul 2024 00:00)  HR: 82 (10 Jul 2024 07:00) (77 - 88)  BP: 92/68 (10 Jul 2024 06:00) (83/59 - 151/117)  BP(mean): 76 (10 Jul 2024 06:00) (56 - 127)  ABP: --  ABP(mean): --  RR: 14 (10 Jul 2024 07:00) (10 - 32)  SpO2: 98% (10 Jul 2024 07:00) (95% - 100%)    O2 Parameters below as of 10 Jul 2024 07:00  Patient On (Oxygen Delivery Method): room air            PHYSICAL EXAMINATION  Appearance: NAD, no distress, frail  HEENT: Moist Mucous Membranes, Anicteric, PERRL, EOMI  Cardiovascular: Regular rate and rhythm, Normal S1 S2, No JVD, No murmurs  Respiratory:  b/l lungs fine crackles to auscultation, No rhonchi, No wheezing.  Gastrointestinal:  firm, Non-tender, + BS  Neurologic: Non-focal, A&Ox3  Skin: Warm and dry, No rashes, No ecchymosis, No cyanosis,2+ pitting edema from abdomen to thigh   Musculoskeletal: No clubbing, No cyanosis, No edema  Vascular: Peripheral pulses palpable 2+ bilaterally  Psychiatry: Mood & affect appropriate      	    		      I&O's Summary    09 Jul 2024 07:01  -  10 Jul 2024 07:00  --------------------------------------------------------  IN: 1033.2 mL / OUT: 3250 mL / NET: -2216.8 mL    	     LABS:	  LABORATORY VALUES	 	                          9.2    7.61  )-----------( 83       ( 10 Jul 2024 04:20 )             27.4       07-10    133<L>  |  92<L>  |  65<H>  ----------------------------<  87  3.0<L>   |  23  |  2.67<H>  07-09    134<L>  |  96<L>  |  70<H>  ----------------------------<  102<H>  4.3   |  22  |  2.83<H>    Ca    8.3<L>      10 Jul 2024 04:20  Ca    8.1<L>      09 Jul 2024 04:30  Phos  3.5     07-10  Phos  3.5     07-09  Mg     2.00     07-10  Mg     2.20     07-09    TPro  5.9<L>  /  Alb  2.8<L>  /  TBili  3.7<H>  /  DBili  x   /  AST  59<H>  /  ALT  42<H>  /  AlkPhos  375<H>  07-10  TPro  6.4  /  Alb  2.9<L>  /  TBili  3.8<H>  /  DBili  x   /  AST  74<H>  /  ALT  46<H>  /  AlkPhos  391<H>  07-09    LIVER FUNCTIONS - ( 10 Jul 2024 04:20 )  Alb: 2.8 g/dL / Pro: 5.9 g/dL / ALK PHOS: 375 U/L / ALT: 42 U/L / AST: 59 U/L / GGT: x               CARDIAC MARKERS:                      Urinalysis Basic - ( 10 Jul 2024 04:20 )    Color: x / Appearance: x / SG: x / pH: x  Gluc: 87 mg/dL / Ketone: x  / Bili: x / Urobili: x   Blood: x / Protein: x / Nitrite: x   Leuk Esterase: x / RBC: x / WBC x   Sq Epi: x / Non Sq Epi: x / Bacteria: x      CAPILLARY BLOOD GLUCOSE      POCT Blood Glucose.: 139 mg/dL (09 Jul 2024 22:02)              < from: TTE Echo Complete w/o Contrast w/ Doppler (06.06.24 @ 08:29) >  Summary:   1. Biatrial enlargement   2. Left ventricular ejection fraction, by visual estimation, is <20%.   3. Severely decreased global left ventricular systolic function.   4. Entire septum, entire apex, and mid anterior segment are abnormal as   described above.   5. Increased LV wall thickness.   6. Mildly increased left ventricular internal cavity size.   7. Right ventricular volume and pressure overload.   8. There is mild concentric left ventricular hypertrophy.   9. Severely enlarged right ventricle.  10. Severely reduced RV systolic function.  11. Moderate mitral valve regurgitation.  12. Moderate-severe tricuspid regurgitation.  13. Mild aortic regurgitation.  14. Mild pulmonic valve regurgitation.  15. Estimated pulmonary artery systolic pressure is 44.6 mmHg assuming a   right atrial pressure of 15 mmHg, which is consistent with mild pulmonary   hypertension.  16. LA volume Index is 38.5 ml/m² ml/m2.

## 2024-07-10 NOTE — CHART NOTE - NSCHARTNOTEFT_GEN_A_CORE
Patient found screaming. Patient appear very uncomfortable but denies pain. On exam noted to have   suprapubic distended. Bladder scan showed 800cc urine. Straight cath done. Patient on flomax 0.8mg Po daily.

## 2024-07-10 NOTE — CHART NOTE - NSCHARTNOTEFT_GEN_A_CORE
Patient again retain 400cc urine in 3 hrs and appear very uncomfortable.  Patient on diuretic .Chandler catheter placed

## 2024-07-10 NOTE — PROGRESS NOTE ADULT - SUBJECTIVE AND OBJECTIVE BOX
FOLLOW UP:    SUBJECTIVE/OBSERVATIONS:    INTERVAL EVENTS:    TELE EVENTS:     Vital Signs Last 24 Hrs  T(C): 36.4 (10 Jul 2024 08:00), Max: 36.6 (10 Jul 2024 00:00)  T(F): 97.6 (10 Jul 2024 08:00), Max: 97.9 (10 Jul 2024 00:00)  HR: 82 (10 Jul 2024 09:00) (77 - 88)  BP: 93/54 (10 Jul 2024 09:00) (83/59 - 151/117)  BP(mean): 64 (10 Jul 2024 09:00) (56 - 127)  RR: 12 (10 Jul 2024 09:00) (10 - 32)  SpO2: 94% (10 Jul 2024 09:00) (94% - 100%)    Parameters below as of 10 Jul 2024 09:00  Patient On (Oxygen Delivery Method): room air      I&O's Summary    09 Jul 2024 07:01  -  10 Jul 2024 07:00  --------------------------------------------------------  IN: 1033.2 mL / OUT: 3250 mL / NET: -2216.8 mL    10 Jul 2024 07:01  -  10 Jul 2024 09:31  --------------------------------------------------------  IN: 198.4 mL / OUT: 250 mL / NET: -51.6 mL        MEDICATIONS:  aspirin enteric coated 81 milliGRAM(s) Oral daily  buMETAnide 6 milliGRAM(s) Oral two times a day  clopidogrel Tablet 75 milliGRAM(s) Oral daily  DOBUTamine Infusion 7.5 MICROgram(s)/kG/Min IV Continuous <Continuous>  heparin   Injectable 5000 Unit(s) SubCutaneous every 12 hours  metolazone 2.5 milliGRAM(s) Oral daily  spironolactone 25 milliGRAM(s) Oral daily    cefTRIAXone   IVPB 1000 milliGRAM(s) IV Intermittent every 24 hours      acetaminophen     Tablet .. 325 milliGRAM(s) Oral every 4 hours PRN  FLUoxetine 20 milliGRAM(s) Oral <User Schedule>  LORazepam     Tablet 0.25 milliGRAM(s) Oral at bedtime  melatonin 3 milliGRAM(s) Oral at bedtime  mirtazapine 15 milliGRAM(s) Oral at bedtime  traMADol 25 milliGRAM(s) Oral once    pantoprazole    Tablet 40 milliGRAM(s) Oral before breakfast  polyethylene glycol 3350 17 Gram(s) Oral two times a day    rosuvastatin 20 milliGRAM(s) Oral at bedtime    chlorhexidine 2% Cloths 1 Application(s) Topical <User Schedule>  latanoprost 0.005% Ophthalmic Solution 1 Drop(s) Both EYES at bedtime  lidocaine   4% Patch 1 Patch Transdermal every 24 hours PRN  lidocaine 4% Topical Solution 1 Application(s) Topical daily  potassium chloride    Tablet ER 20 milliEquivalent(s) Oral two times a day  potassium chloride   Powder 40 milliEquivalent(s) Oral every 4 hours  sodium chloride 0.9% lock flush 10 milliLiter(s) IV Push every 1 hour PRN  tamsulosin 0.8 milliGRAM(s) Oral at bedtime      REVIEW OF SYSTEMS:      PHYSICAL EXAM:  General: NAD  Cardiovascular: Normal S1 S2, No JVD, No murmurs, No edema  Respiratory: Lungs clear to auscultation	  Gastrointestinal:  Soft, Non-tender, + BS	  Skin: warm and dry, No rashes, No ecchymoses, No cyanosis	  Extremities: Normal range of motion, No clubbing, cyanosis or edema  Vascular: Peripheral pulses palpable 2+ bilaterally    LABS:	 	    CBC Full  -  ( 10 Jul 2024 04:20 )  WBC Count : 7.61 K/uL  Hemoglobin : 9.2 g/dL  Hematocrit : 27.4 %  Platelet Count - Automated : 83 K/uL  Mean Cell Volume : 87.3 fL  Mean Cell Hemoglobin : 29.3 pg  Mean Cell Hemoglobin Concentration : 33.6 gm/dL  Auto Neutrophil # : x  Auto Lymphocyte # : x  Auto Monocyte # : x  Auto Eosinophil # : x  Auto Basophil # : x  Auto Neutrophil % : x  Auto Lymphocyte % : x  Auto Monocyte % : x  Auto Eosinophil % : x  Auto Basophil % : x    07-10    133<L>  |  92<L>  |  65<H>  ----------------------------<  87  3.0<L>   |  23  |  2.67<H>  07-09    134<L>  |  96<L>  |  70<H>  ----------------------------<  102<H>  4.3   |  22  |  2.83<H>    Ca    8.3<L>      10 Jul 2024 04:20  Ca    8.1<L>      09 Jul 2024 04:30  Phos  3.5     07-10  Phos  3.5     07-09  Mg     2.00     07-10  Mg     2.20     07-09    TPro  5.9<L>  /  Alb  2.8<L>  /  TBili  3.7<H>  /  DBili  x   /  AST  59<H>  /  ALT  42<H>  /  AlkPhos  375<H>  07-10  TPro  6.4  /  Alb  2.9<L>  /  TBili  3.8<H>  /  DBili  x   /  AST  74<H>  /  ALT  46<H>  /  AlkPhos  391<H>  07-09      proBNP:   Lipid Profile:   HgA1c:   TSH:       CARDIAC MARKERS:            	   FOLLOW UP:  End stage heart failure  SUBJECTIVE/OBSERVATIONS:  Patient seen and examined. He remains yellowish in appearance with ecchymosis on the abdomen, petechia on chest, overall looks sickly. He has many complaints of itchiness and cramps. He denies SOB at rest, but has with minimal physical activity.  He denies chest pain  INTERVAL EVENTS:  Awaiting discharge home, poor prognosis overall, going home on dobutamine gtt 7.5mcg/kg/min  TELE EVENTS:   NSR with frequent PVCs    Vital Signs Last 24 Hrs  T(C): 36.4 (10 Jul 2024 08:00), Max: 36.6 (10 Jul 2024 00:00)  T(F): 97.6 (10 Jul 2024 08:00), Max: 97.9 (10 Jul 2024 00:00)  HR: 82 (10 Jul 2024 09:00) (77 - 88)  BP: 93/54 (10 Jul 2024 09:00) (83/59 - 151/117)  BP(mean): 64 (10 Jul 2024 09:00) (56 - 127)  RR: 12 (10 Jul 2024 09:00) (10 - 32)  SpO2: 94% (10 Jul 2024 09:00) (94% - 100%)    Parameters below as of 10 Jul 2024 09:00  Patient On (Oxygen Delivery Method): room air      I&O's Summary    09 Jul 2024 07:01  -  10 Jul 2024 07:00  --------------------------------------------------------  IN: 1033.2 mL / OUT: 3250 mL / NET: -2216.8 mL    10 Jul 2024 07:01  -  10 Jul 2024 09:31  --------------------------------------------------------  IN: 198.4 mL / OUT: 250 mL / NET: -51.6 mL        MEDICATIONS:  aspirin enteric coated 81 milliGRAM(s) Oral daily  buMETAnide 6 milliGRAM(s) Oral two times a day  clopidogrel Tablet 75 milliGRAM(s) Oral daily  DOBUTamine Infusion 7.5 MICROgram(s)/kG/Min IV Continuous <Continuous>  heparin   Injectable 5000 Unit(s) SubCutaneous every 12 hours  metolazone 2.5 milliGRAM(s) Oral daily  spironolactone 25 milliGRAM(s) Oral daily    cefTRIAXone   IVPB 1000 milliGRAM(s) IV Intermittent every 24 hours      acetaminophen     Tablet .. 325 milliGRAM(s) Oral every 4 hours PRN  FLUoxetine 20 milliGRAM(s) Oral <User Schedule>  LORazepam     Tablet 0.25 milliGRAM(s) Oral at bedtime  melatonin 3 milliGRAM(s) Oral at bedtime  mirtazapine 15 milliGRAM(s) Oral at bedtime  traMADol 25 milliGRAM(s) Oral once    pantoprazole    Tablet 40 milliGRAM(s) Oral before breakfast  polyethylene glycol 3350 17 Gram(s) Oral two times a day    rosuvastatin 20 milliGRAM(s) Oral at bedtime    chlorhexidine 2% Cloths 1 Application(s) Topical <User Schedule>  latanoprost 0.005% Ophthalmic Solution 1 Drop(s) Both EYES at bedtime  lidocaine   4% Patch 1 Patch Transdermal every 24 hours PRN  lidocaine 4% Topical Solution 1 Application(s) Topical daily  potassium chloride    Tablet ER 20 milliEquivalent(s) Oral two times a day  potassium chloride   Powder 40 milliEquivalent(s) Oral every 4 hours  sodium chloride 0.9% lock flush 10 milliLiter(s) IV Push every 1 hour PRN  tamsulosin 0.8 milliGRAM(s) Oral at bedtime      REVIEW OF SYSTEMS:  CONSTITUTIONAL: endorses weakness  EYES/ENT: No visual changes;  No vertigo or throat pain   NECK: No pain or stiffness  RESPIRATORY: endorses SOB with activity  CARDIOVASCULAR: No chest pain or palpitations  GASTROINTESTINAL: No abdominal or epigastric pain. No nausea, vomiting, or hematemesis; No diarrhea or constipation. No melena or hematochezia.  GENITOURINARY: No dysuria, frequency or hematuria  NEUROLOGICAL: No numbness or weakness  SKIN: No itching, rashes      PHYSICAL EXAM:  General: NAD  Cardiovascular: Normal S1 S2, +1 peripheral edema  Respiratory: bibasilar crackles  Gastrointestinal:  Soft, Non-tender, + BS	  Skin: warm and dry, No rashes, No ecchymoses, No cyanosis	  Extremities: Normal range of motion, No clubbing, cyanosis or edema  Vascular: Peripheral pulses palpable 2+ bilaterally    LABS:	 	    CBC Full  -  ( 10 Jul 2024 04:20 )  WBC Count : 7.61 K/uL  Hemoglobin : 9.2 g/dL  Hematocrit : 27.4 %  Platelet Count - Automated : 83 K/uL  Mean Cell Volume : 87.3 fL  Mean Cell Hemoglobin : 29.3 pg  Mean Cell Hemoglobin Concentration : 33.6 gm/dL  Auto Neutrophil # : x  Auto Lymphocyte # : x  Auto Monocyte # : x  Auto Eosinophil # : x  Auto Basophil # : x  Auto Neutrophil % : x  Auto Lymphocyte % : x  Auto Monocyte % : x  Auto Eosinophil % : x  Auto Basophil % : x    07-10    133<L>  |  92<L>  |  65<H>  ----------------------------<  87  3.0<L>   |  23  |  2.67<H>  07-09    134<L>  |  96<L>  |  70<H>  ----------------------------<  102<H>  4.3   |  22  |  2.83<H>    Ca    8.3<L>      10 Jul 2024 04:20  Ca    8.1<L>      09 Jul 2024 04:30  Phos  3.5     07-10  Phos  3.5     07-09  Mg     2.00     07-10  Mg     2.20     07-09    TPro  5.9<L>  /  Alb  2.8<L>  /  TBili  3.7<H>  /  DBili  x   /  AST  59<H>  /  ALT  42<H>  /  AlkPhos  375<H>  07-10  TPro  6.4  /  Alb  2.9<L>  /  TBili  3.8<H>  /  DBili  x   /  AST  74<H>  /  ALT  46<H>  /  AlkPhos  391<H>  07-09

## 2024-07-10 NOTE — PROGRESS NOTE ADULT - ASSESSMENT
79 year old male known to the [facility] HF clinic (Followed by Dr. Shell), with PMH of CAD with STEMI 5/20222 with cardiogenic shock and IABP placement,  but was not revascularized due to non viable myocardium, ICM (TTE 6/6/24 LVEF 20%, RV systolic dysfunction, moderate MR/TR), LV thrombus, prior lung adenocarcinoma with RUL VATS in 11/2022 s/p radiation therapy comes in with complaints of SOB, LE edema and hypotension. Pt started on bipap, swan placed, pt started on ionotropes and admitted to CCU for management     Neuro    # bipolar disorder/depression/anxiety   - AxOX3  - intermittent lethargy likely from shock state > mental status improved  w/no lethargy   - cont home psych meds : FLUoxetine, mirtazapine   - started on ativan Po as needed for anxiety    Resp  #Hypoxic Resp Failure  -wean off bipap, now on RA today  # history of lung cancer s/p RUL lobectomy (9/2022) with recurrent disease s/p radiation to left lung (completed 3/2023),   -Stage IV lung carcinoma, s/p local RT, never on systemic chemotherapy    Cardiac:  #Cardiogenic shock  -volume overloaded on exam  -end stage heart failure on exam  -palliative consulted  -patient is a DNR, but would like to continue dobutamine gtt and does not want to go home on hospice, would like to go home with his dobutamine gtt and home care  - is aware and working on his case  -continue dobutamine gtt at 7.5mcg/kg.   -Bumex drip switch to  bumex 4mg IV BID on 7/8-> switch to 6mg PO bid today  - c/w  metolazone 2.5 daily  - Strict I/O:IN:  1033.2 mL / OUT: 3250 mL / NET: -2216.8 mL      # coronary artery disease   - LHC on 5/2022 showed RCA  and significant LAD and diagonal lesions-but was not revascularized due to non viable myocardium  - c/w Aspirin and Plavix   -resume home rosuvastatin 20mg daily (pt c/o intolerance to atorvastatin)      GI  #Transaminitis   likely from end stage heart failure  - Abdomen distension iso fluid overload  - LFT down trending  -tolerating  DASH/ renal diet  diet 1500mL Fluid Restriction ([id number])  -cont protonix for GI ppx  -poor appetite  -nutrition consult- started on  Suplena 8oz, 2x daily        # BRODY  -elevated creatinine but stable 2.8  -intake and output: IN 1033.2 mL / OUT: 3250 mL / NET: -2216.8 m    Heme:  -cbc stable   -HSQ for DVT ppx      Endo:   -A1c 6.2  -TSH on 12/23 wnl   -maintain gluc<180 o    ID  -Hypothermia resolved  - UA + on 7/8  - bld cx normal  - WBC normal  - + compliant of burning urination  - f/u Ucx  - start on ceftriaxone for complicated UTI x 5days    Line:   Left arm double lumen PIcc line with no blood return->  placed on 6/17 at [facility]    Left upper extremity PICC with tip in the brachiocephalic vein on Cxr 79 year old male known to the [facility] HF clinic (Followed by Dr. Shell), with PMH of CAD with STEMI 5/20222 with cardiogenic shock and IABP placement,  but was not revascularized due to non viable myocardium, ICM (TTE 6/6/24 LVEF 20%, RV systolic dysfunction, moderate MR/TR), LV thrombus, prior lung adenocarcinoma with RUL VATS in 11/2022 s/p radiation therapy comes in with complaints of SOB, LE edema and hypotension. Pt started on bipap, swan placed, pt started on ionotropes and admitted to CCU for management     Neuro    # bipolar disorder/depression/anxiety   - AxOX3  - intermittent lethargy likely from shock state > mental status improved  w/no lethargy   - cont home psych meds : FLUoxetine, mirtazapine   - started on ativan Po as needed for anxiety    Resp  #Hypoxic Resp Failure  -wean off bipap, now on RA today  # history of lung cancer s/p RUL lobectomy (9/2022) with recurrent disease s/p radiation to left lung (completed 3/2023),   -Stage IV lung carcinoma, s/p local RT, never on systemic chemotherapy    Cardiac:  #Cardiogenic shock  -volume overloaded on exam  -end stage heart failure on exam  -palliative consulted  -patient is a DNR, but would like to continue dobutamine gtt and does not want to go home on hospice, would like to go home with his dobutamine gtt and home care  - is aware and working on his case  -continue dobutamine gtt at 7.5mcg/kg.   -Bumex drip switch to  bumex 4mg IV BID on 7/8-> switch to 6mg PO bid on7/90  - c/w  metolazone 2.5 daily  - Strict I/O:IN:  1033.2 mL / OUT: 3250 mL / NET: -2216.8 mL      # coronary artery disease   - LHC on 5/2022 showed RCA  and significant LAD and diagonal lesions-but was not revascularized due to non viable myocardium  - c/w Aspirin and Plavix   -resume home rosuvastatin 20mg daily (pt c/o intolerance to atorvastatin)      GI  #Transaminitis   likely from end stage heart failure  - Abdomen distension iso fluid overload  - LFT down trending  -tolerating  DASH/ renal diet  diet 1500mL Fluid Restriction ([id number])  -cont protonix for GI ppx  -poor appetite  -nutrition consult- started on  Suplena 8oz, 2x daily        # BRODY on CKD  -elevated creatinine but stable 2.8  -intake and output: IN 1033.2 mL / OUT: 3250 mL / NET: -2216.8 m    Heme:  -cbc stable   -HSQ for DVT ppx      Endo:   -A1c 6.2  -TSH on 12/23 wnl   -maintain gluc<180     ID  -Hypothermia resolved  - UA + on 7/8  - bld cx normal  - WBC normal  -burning urination improved   - f/u Ucx  - start on ceftriaxone for complicated UTI x 5days    Line:   Left arm double lumen PIcc line with no blood return->  placed on 6/17 at [facility]    Left upper extremity PICC with tip in the brachiocephalic vein on Cxr 79 year old male known to the [facility] HF clinic (Followed by Dr. Shell), with PMH of CAD with STEMI 5/20222 with cardiogenic shock and IABP placement,  but was not revascularized due to non viable myocardium, ICM (TTE 6/6/24 LVEF 20%, RV systolic dysfunction, moderate MR/TR), LV thrombus, prior lung adenocarcinoma with RUL VATS in 11/2022 s/p radiation therapy comes in with complaints of SOB, LE edema and hypotension. Pt started on bipap, swan placed, pt started on ionotropes and admitted to CCU for management     Neuro    # bipolar disorder/depression/anxiety   - AxOX3  - intermittent lethargy likely from shock state > mental status improved  w/no lethargy   - cont home psych meds : FLUoxetine, mirtazapine   - started on ativan Po as needed for anxiety    Resp  #Hypoxic Resp Failure  -wean off bipap, now on RA today  # history of lung cancer s/p RUL lobectomy (9/2022) with recurrent disease s/p radiation to left lung (completed 3/2023),   -Stage IV lung carcinoma, s/p local RT, never on systemic chemotherapy    Cardiac:  #Cardiogenic shock  -volume overloaded on exam  -end stage heart failure on exam  -palliative consulted  -patient is a DNR, but would like to continue dobutamine gtt and does not want to go home on hospice, would like to go home with his dobutamine gtt and home care  - is aware and working on his case  -continue dobutamine gtt at 7.5mcg/kg.   -Bumex drip switch to  bumex 4mg IV BID on 7/8-> switch to 6mg PO bid on7/90  - change  metolazone 2.5 to every other day  - Strict I/O:IN:  1033.2 mL / OUT: 3250 mL / NET: -2216.8 mL      # coronary artery disease   - LHC on 5/2022 showed RCA  and significant LAD and diagonal lesions-but was not revascularized due to non viable myocardium  - c/w Aspirin and Plavix   -resume home rosuvastatin 20mg daily (pt c/o intolerance to atorvastatin)      GI  #Transaminitis   likely from end stage heart failure  - Abdomen distension iso fluid overload  - LFT down trending  -tolerating  DASH/ renal diet  diet 1500mL Fluid Restriction ([id number])  -cont protonix for GI ppx  -poor appetite  -nutrition consult- started on  Suplena 8oz, 2x daily        # BRODY on CKD  -elevated creatinine but stable 2.8  -intake and output: IN 1033.2 mL / OUT: 3250 mL / NET: -2216.8 m    Heme:  -cbc stable   -HSQ for DVT ppx      Endo:   -A1c 6.2  -TSH on 12/23 wnl   -maintain gluc<180     ID  -Hypothermia resolved  - UA + on 7/8  - bld cx normal  - WBC normal  -burning urination improved   - f/u Ucx  - start on ceftriaxone for complicated UTI x 5days    Line:   Left arm double lumen PIcc line with no blood return->  placed on 6/17 at [facility]    Left upper extremity PICC with tip in the brachiocephalic vein on Cxr

## 2024-07-11 ENCOUNTER — TRANSCRIPTION ENCOUNTER (OUTPATIENT)
Age: 79
End: 2024-07-11

## 2024-07-11 ENCOUNTER — APPOINTMENT (OUTPATIENT)
Dept: RADIATION ONCOLOGY | Facility: CLINIC | Age: 79
End: 2024-07-11

## 2024-07-11 VITALS — RESPIRATION RATE: 33 BRPM | DIASTOLIC BLOOD PRESSURE: 51 MMHG | SYSTOLIC BLOOD PRESSURE: 92 MMHG | HEART RATE: 80 BPM

## 2024-07-11 DIAGNOSIS — F41.9 ANXIETY DISORDER, UNSPECIFIED: ICD-10-CM

## 2024-07-11 LAB
ALBUMIN SERPL ELPH-MCNC: 2.8 G/DL — LOW (ref 3.3–5)
ALP SERPL-CCNC: 402 U/L — HIGH (ref 40–120)
ALT FLD-CCNC: 41 U/L — SIGNIFICANT CHANGE UP (ref 4–41)
ANION GAP SERPL CALC-SCNC: 15 MMOL/L — HIGH (ref 7–14)
AST SERPL-CCNC: 60 U/L — HIGH (ref 4–40)
BILIRUB SERPL-MCNC: 3.8 MG/DL — HIGH (ref 0.2–1.2)
BUN SERPL-MCNC: 60 MG/DL — HIGH (ref 7–23)
CALCIUM SERPL-MCNC: 8.6 MG/DL — SIGNIFICANT CHANGE UP (ref 8.4–10.5)
CHLORIDE SERPL-SCNC: 88 MMOL/L — LOW (ref 98–107)
CO2 SERPL-SCNC: 25 MMOL/L — SIGNIFICANT CHANGE UP (ref 22–31)
CREAT SERPL-MCNC: 2.55 MG/DL — HIGH (ref 0.5–1.3)
EGFR: 25 ML/MIN/1.73M2 — LOW
GLUCOSE SERPL-MCNC: 101 MG/DL — HIGH (ref 70–99)
HCT VFR BLD CALC: 28.2 % — LOW (ref 39–50)
HGB BLD-MCNC: 9.4 G/DL — LOW (ref 13–17)
MAGNESIUM SERPL-MCNC: 1.9 MG/DL — SIGNIFICANT CHANGE UP (ref 1.6–2.6)
MCHC RBC-ENTMCNC: 28.8 PG — SIGNIFICANT CHANGE UP (ref 27–34)
MCHC RBC-ENTMCNC: 33.3 GM/DL — SIGNIFICANT CHANGE UP (ref 32–36)
MCV RBC AUTO: 86.5 FL — SIGNIFICANT CHANGE UP (ref 80–100)
NRBC # BLD: 0 /100 WBCS — SIGNIFICANT CHANGE UP (ref 0–0)
NRBC # FLD: 0 K/UL — SIGNIFICANT CHANGE UP (ref 0–0)
PHOSPHATE SERPL-MCNC: 3.1 MG/DL — SIGNIFICANT CHANGE UP (ref 2.5–4.5)
PLATELET # BLD AUTO: 77 K/UL — LOW (ref 150–400)
POTASSIUM SERPL-MCNC: 3.9 MMOL/L — SIGNIFICANT CHANGE UP (ref 3.5–5.3)
POTASSIUM SERPL-SCNC: 3.9 MMOL/L — SIGNIFICANT CHANGE UP (ref 3.5–5.3)
PROT SERPL-MCNC: 6.6 G/DL — SIGNIFICANT CHANGE UP (ref 6–8.3)
RBC # BLD: 3.26 M/UL — LOW (ref 4.2–5.8)
RBC # FLD: 17.4 % — HIGH (ref 10.3–14.5)
SODIUM SERPL-SCNC: 128 MMOL/L — LOW (ref 135–145)
WBC # BLD: 7.43 K/UL — SIGNIFICANT CHANGE UP (ref 3.8–10.5)
WBC # FLD AUTO: 7.43 K/UL — SIGNIFICANT CHANGE UP (ref 3.8–10.5)

## 2024-07-11 PROCEDURE — 99232 SBSQ HOSP IP/OBS MODERATE 35: CPT | Mod: FS

## 2024-07-11 RX ORDER — HYDROCORTISONE ACETATE 1 %
1 OINTMENT (GRAM) RECTAL DAILY
Refills: 0 | Status: DISCONTINUED | OUTPATIENT
Start: 2024-07-11 | End: 2024-07-11

## 2024-07-11 RX ORDER — LORAZEPAM 0.5 MG
1 TABLET ORAL
Qty: 30 | Refills: 0
Start: 2024-07-11 | End: 2024-08-09

## 2024-07-11 RX ORDER — ACETAMINOPHEN 325 MG
1 TABLET ORAL
Qty: 540 | Refills: 0
Start: 2024-07-11 | End: 2024-10-08

## 2024-07-11 RX ORDER — MAGNESIUM SULFATE 100 %
1 POWDER (GRAM) MISCELLANEOUS ONCE
Refills: 0 | Status: COMPLETED | OUTPATIENT
Start: 2024-07-11 | End: 2024-07-11

## 2024-07-11 RX ORDER — POTASSIUM CHLORIDE 10 MEQ
0.5 TABLET, EXT RELEASE, PARTICLES/CRYSTALS ORAL
Qty: 60 | Refills: 0 | DISCHARGE
Start: 2024-07-11 | End: 2024-08-09

## 2024-07-11 RX ORDER — FLUOXETINE HCL 40 MG
1 CAPSULE ORAL
Qty: 10 | Refills: 0
Start: 2024-07-11 | End: 2024-08-09

## 2024-07-11 RX ORDER — AMOXICILLIN/POTASSIUM CLAV 250-125 MG
500 TABLET ORAL
Qty: 14 | Refills: 0
Start: 2024-07-11 | End: 2024-07-17

## 2024-07-11 RX ORDER — POLYETHYLENE GLYCOL 3350 1 G/G
17 POWDER ORAL
Qty: 1020 | Refills: 0
Start: 2024-07-11 | End: 2024-08-09

## 2024-07-11 RX ORDER — METOLAZONE 5 MG/1
1 TABLET ORAL
Qty: 15 | Refills: 0
Start: 2024-07-11 | End: 2024-08-09

## 2024-07-11 RX ORDER — POTASSIUM CHLORIDE 600 MG/1
20 TABLET, FILM COATED, EXTENDED RELEASE ORAL ONCE
Refills: 0 | Status: COMPLETED | OUTPATIENT
Start: 2024-07-11 | End: 2024-07-11

## 2024-07-11 RX ORDER — SENNOSIDES 8.6 MG
2 TABLET ORAL
Qty: 60 | Refills: 0
Start: 2024-07-11 | End: 2024-08-09

## 2024-07-11 RX ORDER — TAMSULOSIN HYDROCHLORIDE 0.4 MG/1
2 CAPSULE ORAL
Qty: 180 | Refills: 0
Start: 2024-07-11 | End: 2024-10-08

## 2024-07-11 RX ORDER — BUMETANIDE 0.25 MG/ML
3 INJECTION INTRAMUSCULAR; INTRAVENOUS
Qty: 540 | Refills: 0
Start: 2024-07-11 | End: 2024-10-08

## 2024-07-11 RX ORDER — SPIRONOLACTONE 25 MG/1
1 TABLET ORAL
Qty: 90 | Refills: 0
Start: 2024-07-11 | End: 2024-10-08

## 2024-07-11 RX ORDER — ALTEPLASE 2.2 MG/2ML
2 INJECTION, POWDER, LYOPHILIZED, FOR SOLUTION INTRAVENOUS ONCE
Refills: 0 | Status: COMPLETED | OUTPATIENT
Start: 2024-07-11 | End: 2024-07-11

## 2024-07-11 RX ORDER — POTASSIUM CHLORIDE 600 MG/1
1 TABLET, FILM COATED, EXTENDED RELEASE ORAL
Qty: 60 | Refills: 0
Start: 2024-07-11 | End: 2024-08-09

## 2024-07-11 RX ORDER — MIRTAZAPINE 15 MG/1
1 TABLET, FILM COATED ORAL
Qty: 30 | Refills: 0
Start: 2024-07-11 | End: 2024-08-09

## 2024-07-11 RX ORDER — LORAZEPAM 0.5 MG
0.25 TABLET ORAL ONCE
Refills: 0 | Status: DISCONTINUED | OUTPATIENT
Start: 2024-07-11 | End: 2024-07-11

## 2024-07-11 RX ADMIN — HEPARIN SODIUM 5000 UNIT(S): 50 INJECTION, SOLUTION INTRAVENOUS at 17:15

## 2024-07-11 RX ADMIN — POTASSIUM CHLORIDE 20 MILLIEQUIVALENT(S): 600 TABLET, FILM COATED, EXTENDED RELEASE ORAL at 05:51

## 2024-07-11 RX ADMIN — PANTOPRAZOLE SODIUM 40 MILLIGRAM(S): 40 INJECTION, POWDER, FOR SOLUTION INTRAVENOUS at 05:49

## 2024-07-11 RX ADMIN — Medication 19.2 MICROGRAM(S)/KG/MIN: at 16:00

## 2024-07-11 RX ADMIN — Medication 19.2 MICROGRAM(S)/KG/MIN: at 10:07

## 2024-07-11 RX ADMIN — Medication 0.25 MILLIGRAM(S): at 01:46

## 2024-07-11 RX ADMIN — BUMETANIDE 6 MILLIGRAM(S): 0.25 INJECTION INTRAMUSCULAR; INTRAVENOUS at 17:14

## 2024-07-11 RX ADMIN — SPIRONOLACTONE 25 MILLIGRAM(S): 25 TABLET ORAL at 05:50

## 2024-07-11 RX ADMIN — POTASSIUM CHLORIDE 100 MILLIEQUIVALENT(S): 600 TABLET, FILM COATED, EXTENDED RELEASE ORAL at 00:26

## 2024-07-11 RX ADMIN — Medication 1 APPLICATION(S): at 05:55

## 2024-07-11 RX ADMIN — Medication 1 APPLICATION(S): at 12:38

## 2024-07-11 RX ADMIN — ALTEPLASE 2 MILLIGRAM(S): 2.2 INJECTION, POWDER, LYOPHILIZED, FOR SOLUTION INTRAVENOUS at 17:14

## 2024-07-11 RX ADMIN — POLYETHYLENE GLYCOL 3350 17 GRAM(S): 1 POWDER ORAL at 17:15

## 2024-07-11 RX ADMIN — Medication 100 MILLIGRAM(S): at 12:15

## 2024-07-11 RX ADMIN — BUMETANIDE 6 MILLIGRAM(S): 0.25 INJECTION INTRAMUSCULAR; INTRAVENOUS at 05:49

## 2024-07-11 RX ADMIN — Medication 325 MILLIGRAM(S): at 11:00

## 2024-07-11 RX ADMIN — Medication 100 GRAM(S): at 06:11

## 2024-07-11 RX ADMIN — Medication 20 MILLIGRAM(S): at 05:51

## 2024-07-11 RX ADMIN — POLYETHYLENE GLYCOL 3350 17 GRAM(S): 1 POWDER ORAL at 05:49

## 2024-07-11 RX ADMIN — POTASSIUM CHLORIDE 20 MILLIEQUIVALENT(S): 600 TABLET, FILM COATED, EXTENDED RELEASE ORAL at 06:11

## 2024-07-11 RX ADMIN — CLOPIDOGREL BISULFATE 75 MILLIGRAM(S): 75 TABLET, FILM COATED ORAL at 12:16

## 2024-07-11 RX ADMIN — Medication 325 MILLIGRAM(S): at 10:06

## 2024-07-11 RX ADMIN — POTASSIUM CHLORIDE 20 MILLIEQUIVALENT(S): 600 TABLET, FILM COATED, EXTENDED RELEASE ORAL at 17:14

## 2024-07-11 RX ADMIN — ALTEPLASE 2 MILLIGRAM(S): 2.2 INJECTION, POWDER, LYOPHILIZED, FOR SOLUTION INTRAVENOUS at 15:48

## 2024-07-11 RX ADMIN — HEPARIN SODIUM 5000 UNIT(S): 50 INJECTION, SOLUTION INTRAVENOUS at 05:50

## 2024-07-11 RX ADMIN — ASPIRIN 81 MILLIGRAM(S): 325 TABLET, FILM COATED ORAL at 12:16

## 2024-07-11 NOTE — DISCHARGE NOTE NURSING/CASE MANAGEMENT/SOCIAL WORK - NSDCPEFALRISK_GEN_ALL_CORE
For information on Fall & Injury Prevention, visit: [email or web address] OR  [email or web address] OR  https://www.cdc.gov/steadi/patient.html

## 2024-07-11 NOTE — PROGRESS NOTE ADULT - SUBJECTIVE AND OBJECTIVE BOX
Interval History:  Patient resting comfortably in bed   Denies CP/SOB/palpitations/dizziness.  No acute events overnight.    Medications:  acetaminophen     Tablet .. 325 milliGRAM(s) Oral every 4 hours PRN  aspirin enteric coated 81 milliGRAM(s) Oral daily  buMETAnide 6 milliGRAM(s) Oral two times a day  cefTRIAXone   IVPB 1000 milliGRAM(s) IV Intermittent every 24 hours  chlorhexidine 2% Cloths 1 Application(s) Topical <User Schedule>  clopidogrel Tablet 75 milliGRAM(s) Oral daily  DOBUTamine Infusion 7.5 MICROgram(s)/kG/Min IV Continuous <Continuous>  FLUoxetine 20 milliGRAM(s) Oral <User Schedule>  heparin   Injectable 5000 Unit(s) SubCutaneous every 12 hours  latanoprost 0.005% Ophthalmic Solution 1 Drop(s) Both EYES at bedtime  lidocaine   4% Patch 1 Patch Transdermal every 24 hours PRN  lidocaine 4% Topical Solution 1 Application(s) Topical daily  LORazepam     Tablet 0.25 milliGRAM(s) Oral at bedtime  melatonin 3 milliGRAM(s) Oral at bedtime  mirtazapine 15 milliGRAM(s) Oral at bedtime  pantoprazole    Tablet 40 milliGRAM(s) Oral before breakfast  polyethylene glycol 3350 17 Gram(s) Oral two times a day  potassium chloride    Tablet ER 20 milliEquivalent(s) Oral two times a day  rosuvastatin 20 milliGRAM(s) Oral at bedtime  senna 2 Tablet(s) Oral at bedtime  sodium chloride 0.9% lock flush 10 milliLiter(s) IV Push every 1 hour PRN  spironolactone 25 milliGRAM(s) Oral daily  tamsulosin 0.8 milliGRAM(s) Oral at bedtime  traMADol 25 milliGRAM(s) Oral once      Vitals:  T(C): 36.8 (24 @ 04:00), Max: 36.8 (07-10-24 @ 20:38)  HR: 78 (24 @ 09:00) (77 - 83)  BP: 87/58 (24 @ 09:00) (83/53 - 97/62)  BP(mean): 67 (24 @ 09:00) (54 - 78)  RR: 16 (24 @ 09:00) (11 - 26)  SpO2: 98% (24 @ 09:00) (94% - 100%)    Daily     Daily Weight in k.5 (2024 08:00)        I&O's Summary    10 Jul 2024 07:01  -  2024 07:00  --------------------------------------------------------  IN: 1280.8 mL / OUT: 5260 mL / NET: -3979.2 mL    2024 07:01  -  2024 10:00  --------------------------------------------------------  IN: 38.4 mL / OUT: 550 mL / NET: -511.6 mL        Physical Exam:  Appearance: No Acute Distress  Neck: JVP  Cardiovascular: Normal S1 S2  Respiratory: Clear to auscultation bilaterally  Gastrointestinal: Soft, Non-tender	  Skin: No cyanosis	  Neurologic: Non-focal  Extremities: LE edema      Labs:                        9.4    7.43  )-----------( 77       ( 2024 04:00 )             28.2     11    128<L>  |  88<L>  |  60<H>  ----------------------------<  101<H>  3.9   |  25  |  2.55<H>    Ca    8.6      2024 04:00  Phos  3.1       Mg     1.90         TPro  6.6  /  Alb  2.8<L>  /  TBili  3.8<H>  /  DBili  x   /  AST  60<H>  /  ALT  41  /  AlkPhos  402<H>        TELEMETRY: Sinus Rhythm    Echocardiogram:  TTE Echo Complete w/o Contrast w/ Doppler (24 @ 08:29)     PHYSICIAN INTERPRETATION:  Left Ventricle: The left ventricular internal cavity size is mildly   increased. Left ventricular wall thickness is increased. There is mild   concentric left ventricular hypertrophy involving the global wall. The   LVH involves global walls.  Global LV systolic function was severely decreased.Left ventricular   ejection fraction, by visual estimation, is <20%. The interventricular   septum is flattened in systole and diastole, consistent with right   ventricular pressure and volume overload. Spectral Doppler shows normal   pattern of LV diastolic filling.      LV Wall Scoring:  The RCA distribution, entire septum, and entire apex are akinetic. The mid  anterior segment is hypokinetic. All remaining scored segments are normal.    Right Ventricle: The right ventricular size is severely enlarged. RV   systolic function is severely reduced. TV S' 0.1 m/s.  Left Atrium: Mildly enlarged left atrium. LA volume Index is 38.5 ml/m²   ml/m2.  Right Atrium: Severely enlarged right atrium.  Pericardium: Trivial pericardial effusion is present. The pericardial   effusion is localized near the right ventricle.  Mitral Valve: Structurally normal mitral valve, with normal leaflet   excursion. Moderate mitral valve regurgitation is seen. The MR jet is   centrally-directed. Diastolic mitral regurgitation is present.  Tricuspid Valve: Structurally normal tricuspid valve, with normal leaflet   excursion. Moderate-severe tricuspid regurgitation is visualized.   Estimated pulmonary artery systolic pressure is 44.6 mmHg assuming a   right atrialpressure of 15 mmHg, which is consistent with mild pulmonary   hypertension.  Aortic Valve: Normal trileaflet aortic valve with normal opening. Mild   aortic valve regurgitation is seen.  Pulmonic Valve: Structurally normal pulmonic valve, with normal leaflet   excursion. Mild pulmonic valve regurgitation.  Aorta: The aortic root and ascending aorta are structurally normal, with   no evidence of dilitation. The aortic arch was not well visualized.  Pulmonary Artery: The main pulmonary artery is normal in size.  Venous: The inferior vena cava is abnormal. The inferior vena cava was   dilated, with respiratory size variation less tan 50%.      Summary:   1. Biatrial enlargement   2. Left ventricular ejection fraction, by visual estimation, is <20%.   3. Severely decreased global left ventricular systolic function.   4. Entire septum, entire apex, and mid anterior segment are abnormal as   described above.   5. Increased LV wall thickness.   6. Mildly increased left ventricular internal cavity size.   7. Right ventricular volume and pressure overload.   8. There is mild concentric left ventricular hypertrophy.   9. Severely enlarged right ventricle.  10. Severely reduced RV systolic function.  11. Moderate mitral valve regurgitation.  12. Moderate-severe tricuspid regurgitation.  13. Mild aortic regurgitation.  14. Mild pulmonic valve regurgitation.  15. Estimated pulmonary artery systolic pressure is 44.6 mmHg assuming a   right atrial pressure of 15 mmHg, which is consistent with mild pulmonary   hypertension.  16. LA volume Index is 38.5 ml/m² ml/m2.       Interval History:  Patient resting comfortably in bed   Denies CP/SOB/palpitations/dizziness.  No acute events overnight.    Medications:  acetaminophen     Tablet .. 325 milliGRAM(s) Oral every 4 hours PRN  aspirin enteric coated 81 milliGRAM(s) Oral daily  buMETAnide 6 milliGRAM(s) Oral two times a day  cefTRIAXone   IVPB 1000 milliGRAM(s) IV Intermittent every 24 hours  chlorhexidine 2% Cloths 1 Application(s) Topical <User Schedule>  clopidogrel Tablet 75 milliGRAM(s) Oral daily  DOBUTamine Infusion 7.5 MICROgram(s)/kG/Min IV Continuous <Continuous>  FLUoxetine 20 milliGRAM(s) Oral <User Schedule>  heparin   Injectable 5000 Unit(s) SubCutaneous every 12 hours  latanoprost 0.005% Ophthalmic Solution 1 Drop(s) Both EYES at bedtime  lidocaine   4% Patch 1 Patch Transdermal every 24 hours PRN  lidocaine 4% Topical Solution 1 Application(s) Topical daily  LORazepam     Tablet 0.25 milliGRAM(s) Oral at bedtime  melatonin 3 milliGRAM(s) Oral at bedtime  mirtazapine 15 milliGRAM(s) Oral at bedtime  pantoprazole    Tablet 40 milliGRAM(s) Oral before breakfast  polyethylene glycol 3350 17 Gram(s) Oral two times a day  potassium chloride    Tablet ER 20 milliEquivalent(s) Oral two times a day  rosuvastatin 20 milliGRAM(s) Oral at bedtime  senna 2 Tablet(s) Oral at bedtime  sodium chloride 0.9% lock flush 10 milliLiter(s) IV Push every 1 hour PRN  spironolactone 25 milliGRAM(s) Oral daily  tamsulosin 0.8 milliGRAM(s) Oral at bedtime  traMADol 25 milliGRAM(s) Oral once      Vitals:  T(C): 36.8 (24 @ 04:00), Max: 36.8 (07-10-24 @ 20:38)  HR: 78 (24 @ 09:00) (77 - 83)  BP: 87/58 (24 @ 09:00) (83/53 - 97/62)  BP(mean): 67 (24 @ 09:00) (54 - 78)  RR: 16 (24 @ 09:00) (11 - 26)  SpO2: 98% (24 @ 09:00) (94% - 100%)    Daily     Daily Weight in k.5 (2024 08:00)        I&O's Summary    10 Jul 2024 07:01  -  2024 07:00  --------------------------------------------------------  IN: 1280.8 mL / OUT: 5260 mL / NET: -3979.2 mL    2024 07:01  -  2024 10:00  --------------------------------------------------------  IN: 38.4 mL / OUT: 550 mL / NET: -511.6 mL        Physical Exam:  Appearance: No Acute Distress  Neck: JVP~10  Cardiovascular: Normal S1 S2  Respiratory: Clear to auscultation diminished bilateral bases R>L  Gastrointestinal: Soft, Non-tender	  Skin: No cyanosis	  Neurologic: Non-focal  Extremities: BLE edema      Labs:                        9.4    7.43  )-----------( 77       ( 2024 04:00 )             28.2     11    128<L>  |  88<L>  |  60<H>  ----------------------------<  101<H>  3.9   |  25  |  2.55<H>    Ca    8.6      2024 04:00  Phos  3.1       Mg     1.90         TPro  6.6  /  Alb  2.8<L>  /  TBili  3.8<H>  /  DBili  x   /  AST  60<H>  /  ALT  41  /  AlkPhos  402<H>        TELEMETRY: Sinus Rhythm    Echocardiogram:  TTE Echo Complete w/o Contrast w/ Doppler (24 @ 08:29)     PHYSICIAN INTERPRETATION:  Left Ventricle: The left ventricular internal cavity size is mildly   increased. Left ventricular wall thickness is increased. There is mild   concentric left ventricular hypertrophy involving the global wall. The   LVH involves global walls.  Global LV systolic function was severely decreased.Left ventricular   ejection fraction, by visual estimation, is <20%. The interventricular   septum is flattened in systole and diastole, consistent with right   ventricular pressure and volume overload. Spectral Doppler shows normal   pattern of LV diastolic filling.      LV Wall Scoring:  The RCA distribution, entire septum, and entire apex are akinetic. The mid  anterior segment is hypokinetic. All remaining scored segments are normal.    Right Ventricle: The right ventricular size is severely enlarged. RV   systolic function is severely reduced. TV S' 0.1 m/s.  Left Atrium: Mildly enlarged left atrium. LA volume Index is 38.5 ml/m²   ml/m2.  Right Atrium: Severely enlarged right atrium.  Pericardium: Trivial pericardial effusion is present. The pericardial   effusion is localized near the right ventricle.  Mitral Valve: Structurally normal mitral valve, with normal leaflet   excursion. Moderate mitral valve regurgitation is seen. The MR jet is   centrally-directed. Diastolic mitral regurgitation is present.  Tricuspid Valve: Structurally normal tricuspid valve, with normal leaflet   excursion. Moderate-severe tricuspid regurgitation is visualized.   Estimated pulmonary artery systolic pressure is 44.6 mmHg assuming a   right atrialpressure of 15 mmHg, which is consistent with mild pulmonary   hypertension.  Aortic Valve: Normal trileaflet aortic valve with normal opening. Mild   aortic valve regurgitation is seen.  Pulmonic Valve: Structurally normal pulmonic valve, with normal leaflet   excursion. Mild pulmonic valve regurgitation.  Aorta: The aortic root and ascending aorta are structurally normal, with   no evidence of dilitation. The aortic arch was not well visualized.  Pulmonary Artery: The main pulmonary artery is normal in size.  Venous: The inferior vena cava is abnormal. The inferior vena cava was   dilated, with respiratory size variation less tan 50%.      Summary:   1. Biatrial enlargement   2. Left ventricular ejection fraction, by visual estimation, is <20%.   3. Severely decreased global left ventricular systolic function.   4. Entire septum, entire apex, and mid anterior segment are abnormal as   described above.   5. Increased LV wall thickness.   6. Mildly increased left ventricular internal cavity size.   7. Right ventricular volume and pressure overload.   8. There is mild concentric left ventricular hypertrophy.   9. Severely enlarged right ventricle.  10. Severely reduced RV systolic function.  11. Moderate mitral valve regurgitation.  12. Moderate-severe tricuspid regurgitation.  13. Mild aortic regurgitation.  14. Mild pulmonic valve regurgitation.  15. Estimated pulmonary artery systolic pressure is 44.6 mmHg assuming a   right atrial pressure of 15 mmHg, which is consistent with mild pulmonary   hypertension.  16. LA volume Index is 38.5 ml/m² ml/m2.

## 2024-07-11 NOTE — PROGRESS NOTE ADULT - NS ATTEND OPT1A GEN_ALL_CORE
History/Exam/Medical decision making
Medical decision making

## 2024-07-11 NOTE — DISCHARGE NOTE NURSING/CASE MANAGEMENT/SOCIAL WORK - NSSCTYPOFSERV_GEN_ALL_CORE
The [facility] Nurse will hook-up your Dobutamine Drip @ the Hospital prior to your Discharge Home. The [facility] Nurse will also come to your house this evening for F/U.

## 2024-07-11 NOTE — PROGRESS NOTE ADULT - NS ATTEND AMEND GEN_ALL_CORE FT
Pt seen and evaluated. Agree with findings and plans above.
Pt will need to go home with VNS on dobutamine. [facility] will not cover dobutamine indefinitely.  Change Bumex to 6 mg po bid.  D/c planning. Wife would like to maximize home services.
Pt seen and evaluated. Agree with findings and plans above.
Hold metolazone. Start 2.5 mg po qM and qF (starting next week).  Cont  gtt.  D/c planning.
Pt seen and evaluated. Agree with findings and plans above.
Good diuresis. He is clinically stable.  Renal function slowly improving.  Decrease metolazone to QOD dosing.  D/c planning.

## 2024-07-11 NOTE — PROGRESS NOTE ADULT - REASON FOR ADMISSION
decompensated Heart failure

## 2024-07-11 NOTE — PROGRESS NOTE ADULT - ASSESSMENT
This is a 79 year old male known to the [facility] HF clinic (Followed by Dr. Shell), with PMH of CAD with STEMI  with cardiogenic shock and IABP placement (medically managed d/t non-viable myocardium), ICM/HFrEF (EF 20%, RV systolic dysfunction, moderate MR/TR; on  since ), LV thrombus, prior lung adenocarcinoma with RUL VATS in 2022 s/p radiation therapy presented on  with complaints of SOB, LE and hypotension. In ED patient is tachypenic, tachycardic with BP 60-130s (SVT), hypotensive with SBP 70-90s, O2 sat 70s, and was placed on BIPAP. Home Dobutamine gtt was held in ED due to SVT, but was restarted s/p HF consultation. Overall stage D HF, NYHA class IV- is in cardiogenic shock with massive volume overload. Improving with Dobutamine/Bumex gtt.

## 2024-07-11 NOTE — DISCHARGE NOTE NURSING/CASE MANAGEMENT/SOCIAL WORK - NSDCCRTYPESERV_GEN_ALL_CORE_FT
Visiting Nurse Services, Home Physical Therapy, & Home Health Aide. The 1st Visiting RN  Home Visit will be:  Friday 7/12/24. The Visiting Nurse will call to arrange the Visit time.

## 2024-07-11 NOTE — PROGRESS NOTE ADULT - ASSESSMENT
79 year old male known to the [facility] HF clinic (Followed by Dr. Shell), with PMH of CAD with STEMI 5/20222 with cardiogenic shock and IABP placement,  but was not revascularized due to non viable myocardium, ICM (TTE 6/6/24 LVEF 20%, RV systolic dysfunction, moderate MR/TR), LV thrombus, prior lung adenocarcinoma with RUL VATS in 11/2022 s/p radiation therapy comes in with complaints of SOB, LE edema and hypotension. Pt started on bipap, swan placed, pt started on ionotropes and admitted to CCU for management     Neuro    # bipolar disorder/depression/anxiety   - AxOX3  - intermittent lethargy likely from shock state > mental status improved  w/no lethargy   - cont home psych meds : FLUoxetine, mirtazapine   - started on ativan Po as needed for anxiety    Resp  #Hypoxic Resp Failure  -on room air  # history of lung cancer s/p RUL lobectomy (9/2022) with recurrent disease s/p radiation to left lung (completed 3/2023),   -Stage IV lung carcinoma, s/p local RT, never on systemic chemotherapy    Cardiac:  #End stage Heart Failure  -going home today on palliative dobutamine gtt at 7.5mcg/kg/min  -going home on bumex 6mg twice a day  -going home on spironalactone  -standing weight to be done ( the bed weight is not accurate)        # coronary artery disease   - LHC on 5/2022 showed RCA  and significant LAD and diagonal lesions-but was not revascularized due to non viable myocardium  - c/w Aspirin and Plavix   -resume home rosuvastatin 20mg daily (pt c/o intolerance to atorvastatin)      GI  #Transaminitis        # Chronic Kidney disease  -remains on diuresis, elevated BUN and creatinine, but stable from yesterday    UTI:  remains on ceftriaxone day 3  can transition to an oral antibiotic prior to discharge    Heme:  -cbc stable   -HSQ for DVT ppx      Endo:   -A1c 6.2  -TSH on 12/23 wnl   -maintain gluc<180     ID  -Hypothermia resolved  - UA + on 7/8  - bld cx normal  - WBC normal  -burning urination improved   - f/u Ucx  - start on ceftriaxone for complicated UTI x 5days    Line:   Left arm double lumen PIcc line with no blood return->  placed on 6/17 at [facility]    Left upper extremity PICC with tip in the brachiocephalic vein on Cxr  DISCHARGE PLANNING

## 2024-07-11 NOTE — PROGRESS NOTE ADULT - NUTRITIONAL ASSESSMENT
This patient has been assessed with a concern for Malnutrition and has been determined to have a diagnosis/diagnoses of Severe protein-calorie malnutrition.    This patient is being managed with:   Diet DASH/TLC-  Sodium & Cholesterol Restricted  1500mL Fluid Restriction ([id number])  For patients receiving Renal Replacement - No Protein Restr No Conc K No Conc Phos Low Sodium (RENAL)  Supplement Feeding Modality:  Oral  Suplena Cans or Servings Per Day:  1       Frequency:  Two Times a day  Entered: Jul 9 2024  8:06AM  
This patient has been assessed with a concern for Malnutrition and has been determined to have a diagnosis/diagnoses of Severe protein-calorie malnutrition.    This patient is being managed with:   Diet DASH/TLC-  Sodium & Cholesterol Restricted  1500mL Fluid Restriction ([id number])  For patients receiving Renal Replacement - No Protein Restr No Conc K No Conc Phos Low Sodium (RENAL)  Supplement Feeding Modality:  Oral  Suplena Cans or Servings Per Day:  1       Frequency:  Two Times a day  Entered: Jul 9 2024  8:06AM  
This patient has been assessed with a concern for Malnutrition and has been determined to have a diagnosis/diagnoses of Severe protein-calorie malnutrition.    This patient is being managed with:   Diet DASH/TLC-  Sodium & Cholesterol Restricted  1500mL Fluid Restriction ([id number])  For patients receiving Renal Replacement - No Protein Restr No Conc K No Conc Phos Low Sodium (RENAL)  Entered: Jul  3 2024  2:54PM  
This patient has been assessed with a concern for Malnutrition and has been determined to have a diagnosis/diagnoses of Severe protein-calorie malnutrition.    This patient is being managed with:   Diet DASH/TLC-  Sodium & Cholesterol Restricted  1500mL Fluid Restriction ([id number])  For patients receiving Renal Replacement - No Protein Restr No Conc K No Conc Phos Low Sodium (RENAL)  Supplement Feeding Modality:  Oral  Suplena Cans or Servings Per Day:  1       Frequency:  Two Times a day  Entered: Jul 9 2024  8:06AM  
This patient has been assessed with a concern for Malnutrition and has been determined to have a diagnosis/diagnoses of Severe protein-calorie malnutrition.    This patient is being managed with:   Diet DASH/TLC-  Sodium & Cholesterol Restricted  1500mL Fluid Restriction ([id number])  For patients receiving Renal Replacement - No Protein Restr No Conc K No Conc Phos Low Sodium (RENAL)  Entered: Jul  3 2024  2:54PM  
This patient has been assessed with a concern for Malnutrition and has been determined to have a diagnosis/diagnoses of Severe protein-calorie malnutrition.    This patient is being managed with:   Diet DASH/TLC-  Sodium & Cholesterol Restricted  1500mL Fluid Restriction ([id number])  For patients receiving Renal Replacement - No Protein Restr No Conc K No Conc Phos Low Sodium (RENAL)  Supplement Feeding Modality:  Oral  Suplena Cans or Servings Per Day:  1       Frequency:  Two Times a day  Entered: Jul 9 2024  8:06AM

## 2024-07-11 NOTE — DISCHARGE NOTE NURSING/CASE MANAGEMENT/SOCIAL WORK - PATIENT PORTAL LINK FT
You can access the FollowMyHealth Patient Portal offered by [facility] by registering at the following website: [email or web address]. By joining [facility]’s FollowMyHealth portal, you will also be able to view your health information using other applications (apps) compatible with our system.

## 2024-07-11 NOTE — DISCHARGE NOTE NURSING/CASE MANAGEMENT/SOCIAL WORK - NSDCVIVACCINE_GEN_ALL_CORE_FT
COVID-19, mRNA, LNP-S, PF, 100 mcg/ 0.5 mL dose (Moderna); 17-May-2022 14:07; Pankaj Velazco (RN); [employer] [id number] (Exp. Date: 05-Jun-2022); IntraMuscular; Deltoid Left.; 0.25 milliLiter(s);

## 2024-07-11 NOTE — PROGRESS NOTE ADULT - NS ATTEND OPT1 GEN_ALL_CORE
I attest my time as attending is greater than 50% of the total combined time spent on qualifying patient care activities by the PA/NP and attending.
I independently performed the documented:

## 2024-07-11 NOTE — PROGRESS NOTE ADULT - SUBJECTIVE AND OBJECTIVE BOX
FOLLOW UP:  Heart Failure  SUBJECTIVE/OBSERVATIONS:  Patient seen and examined. He continues to have multiple complaints of leg cramps, bladder spasms. He had urinary retention and gerardo was placed back in. He is tentatively scheduled to be discharged to home with palliative care, not hospice. He will be going home with a gerardo catheter and outpatient follow up with a urologist.     TELE EVENTS:   NSR    Vital Signs Last 24 Hrs  T(C): 36.8 (11 Jul 2024 04:00), Max: 36.8 (10 Jul 2024 20:38)  T(F): 98.2 (11 Jul 2024 04:00), Max: 98.3 (10 Jul 2024 20:38)  HR: 78 (11 Jul 2024 07:00) (77 - 83)  BP: 96/53 (11 Jul 2024 07:00) (81/58 - 97/62)  BP(mean): 69 (11 Jul 2024 07:00) (54 - 78)  RR: 23 (11 Jul 2024 07:00) (11 - 23)  SpO2: 99% (11 Jul 2024 07:00) (94% - 100%)    Parameters below as of 11 Jul 2024 07:00  Patient On (Oxygen Delivery Method): room air      I&O's Summary    10 Jul 2024 07:01  -  11 Jul 2024 07:00  --------------------------------------------------------  IN: 1261.6 mL / OUT: 5260 mL / NET: -3998.4 mL        MEDICATIONS:  aspirin enteric coated 81 milliGRAM(s) Oral daily  buMETAnide 6 milliGRAM(s) Oral two times a day  clopidogrel Tablet 75 milliGRAM(s) Oral daily  DOBUTamine Infusion 7.5 MICROgram(s)/kG/Min IV Continuous <Continuous>  heparin   Injectable 5000 Unit(s) SubCutaneous every 12 hours  spironolactone 25 milliGRAM(s) Oral daily  cefTRIAXone   IVPB 1000 milliGRAM(s) IV Intermittent every 24 hours  acetaminophen     Tablet .. 325 milliGRAM(s) Oral every 4 hours PRN  FLUoxetine 20 milliGRAM(s) Oral <User Schedule>  LORazepam     Tablet 0.25 milliGRAM(s) Oral at bedtime  melatonin 3 milliGRAM(s) Oral at bedtime  mirtazapine 15 milliGRAM(s) Oral at bedtime  traMADol 25 milliGRAM(s) Oral once  pantoprazole    Tablet 40 milliGRAM(s) Oral before breakfast  polyethylene glycol 3350 17 Gram(s) Oral two times a day  senna 2 Tablet(s) Oral at bedtime  rosuvastatin 20 milliGRAM(s) Oral at bedtime  chlorhexidine 2% Cloths 1 Application(s) Topical <User Schedule>  latanoprost 0.005% Ophthalmic Solution 1 Drop(s) Both EYES at bedtime  lidocaine   4% Patch 1 Patch Transdermal every 24 hours PRN  lidocaine 4% Topical Solution 1 Application(s) Topical daily  potassium chloride    Tablet ER 20 milliEquivalent(s) Oral two times a day  sodium chloride 0.9% lock flush 10 milliLiter(s) IV Push every 1 hour PRN  tamsulosin 0.8 milliGRAM(s) Oral at bedtime    REVIEW OF SYSTEMS:  CONSTITUTIONAL: endorses weakness  EYES/ENT: No visual changes;  No vertigo or throat pain   NECK: No pain or stiffness  RESPIRATORY: endorses shortness of breath  CARDIOVASCULAR: No chest pain or palpitations  GASTROINTESTINAL: No abdominal or epigastric pain. No nausea, vomiting, or hematemesis; No diarrhea or constipation. No melena or hematochezia.  GENITOURINARY: endorses bladder spasms  NEUROLOGICAL: No numbness or weakness  SKIN: No itching, rashes      PHYSICAL EXAM:  General: NAD  Cardiovascular: Normal S1 S2, elevated JVD, No murmurs, No edema  Respiratory: bibasilar crackles  Gastrointestinal:  ascites, Soft, Non-tender, + BS	  Skin: purpura on the chest  Extremities: Normal range of motion, No clubbing, cyanosis or edema  Vascular: Peripheral pulses palpable 2+ bilaterally    LABS:	 	    CBC Full  -  ( 11 Jul 2024 04:00 )  WBC Count : 7.43 K/uL  Hemoglobin : 9.4 g/dL  Hematocrit : 28.2 %  Platelet Count - Automated : 77 K/uL  Mean Cell Volume : 86.5 fL  Mean Cell Hemoglobin : 28.8 pg  Mean Cell Hemoglobin Concentration : 33.3 gm/dL  Auto Neutrophil # : x  Auto Lymphocyte # : x  Auto Monocyte # : x  Auto Eosinophil # : x  Auto Basophil # : x  Auto Neutrophil % : x  Auto Lymphocyte % : x  Auto Monocyte % : x  Auto Eosinophil % : x  Auto Basophil % : x    07-11    128<L>  |  88<L>  |  60<H>  ----------------------------<  101<H>  3.9   |  25  |  2.55<H>  07-10    129<L>  |  88<L>  |  62<H>  ----------------------------<  108<H>  3.4<L>   |  25  |  2.49<H>    Ca    8.6      11 Jul 2024 04:00  Ca    8.6      10 Jul 2024 16:55  Phos  3.1     07-11  Phos  3.2     07-10  Mg     1.90     07-11  Mg     2.10     07-10    TPro  6.6  /  Alb  2.8<L>  /  TBili  3.8<H>  /  DBili  x   /  AST  60<H>  /  ALT  41  /  AlkPhos  402<H>  07-11  TPro  6.7  /  Alb  3.0<L>  /  TBili  3.7<H>  /  DBili  x   /  AST  69<H>  /  ALT  44<H>  /  AlkPhos  421<H>  07-10

## 2024-07-11 NOTE — PROGRESS NOTE ADULT - PROBLEM SELECTOR PLAN 1
Dobutamine 7.5mcg/kg/min.  Bumex 6mg BID  Spironolactone 25 mg daily  Potassium 20meq Bid  Monitor lytes; replete K 4.0-5.0. and Mag >2.0   Strict I/O   Daily weights  Discharge planning  Pending final recommendations from HF attending Dobutamine 7.5mcg/kg/min  Bumex 6mg BID  Spironolactone 25 mg daily  Potassium 20meq Bid  Monitor lytes; replete K 4.0-5.0. and Mag >2.0   Strict I/O   Daily weights  Discharge today   Management per CCU team   Pending final recommendations from HF attending

## 2024-07-12 DIAGNOSIS — I47.10 SUPRAVENTRICULAR TACHYCARDIA, UNSPECIFIED: ICD-10-CM

## 2024-07-12 RX ORDER — RIVAROXABAN 10 MG/1
10 TABLET, FILM COATED ORAL DAILY
Refills: 0 | Status: ACTIVE | COMMUNITY

## 2024-07-12 RX ORDER — SPIRONOLACTONE 25 MG/1
25 TABLET ORAL DAILY
Refills: 0 | Status: ACTIVE | COMMUNITY

## 2024-07-12 RX ORDER — ACETAMINOPHEN 325 MG/1
325 TABLET ORAL EVERY 4 HOURS
Refills: 0 | Status: ACTIVE | COMMUNITY

## 2024-07-12 RX ORDER — POTASSIUM CHLORIDE 1500 MG/1
20 TABLET, FILM COATED, EXTENDED RELEASE ORAL TWICE DAILY
Refills: 0 | Status: ACTIVE | COMMUNITY

## 2024-07-12 RX ORDER — SENNOSIDES 8.6 MG/1
8.6 TABLET ORAL AT BEDTIME
Refills: 0 | Status: ACTIVE | COMMUNITY

## 2024-07-12 RX ORDER — LORATADINE 5 MG
17 TABLET,CHEWABLE ORAL TWICE DAILY
Refills: 0 | Status: ACTIVE | COMMUNITY

## 2024-07-12 RX ORDER — DOBUTAMINE IN DEXTROSE 400 MG/100ML
INJECTION, SOLUTION INTRAVENOUS
Refills: 0 | Status: ACTIVE | COMMUNITY

## 2024-07-13 LAB
-  AMOXICILLIN/CLAVULANIC ACID: SIGNIFICANT CHANGE UP
-  AMPICILLIN/SULBACTAM: SIGNIFICANT CHANGE UP
-  AMPICILLIN: SIGNIFICANT CHANGE UP
-  AZTREONAM: SIGNIFICANT CHANGE UP
-  CEFAZOLIN: SIGNIFICANT CHANGE UP
-  CEFEPIME: SIGNIFICANT CHANGE UP
-  CEFOXITIN: SIGNIFICANT CHANGE UP
-  CEFTRIAXONE: SIGNIFICANT CHANGE UP
-  CIPROFLOXACIN: SIGNIFICANT CHANGE UP
-  ERTAPENEM: SIGNIFICANT CHANGE UP
-  GENTAMICIN: SIGNIFICANT CHANGE UP
-  IMIPENEM: SIGNIFICANT CHANGE UP
-  LEVOFLOXACIN: SIGNIFICANT CHANGE UP
-  MEROPENEM: SIGNIFICANT CHANGE UP
-  NITROFURANTOIN: SIGNIFICANT CHANGE UP
-  PIPERACILLIN/TAZOBACTAM: SIGNIFICANT CHANGE UP
-  TOBRAMYCIN: SIGNIFICANT CHANGE UP
-  TRIMETHOPRIM/SULFAMETHOXAZOLE: SIGNIFICANT CHANGE UP
CULTURE RESULTS: ABNORMAL
METHOD TYPE: SIGNIFICANT CHANGE UP
ORGANISM # SPEC MICROSCOPIC CNT: ABNORMAL
ORGANISM # SPEC MICROSCOPIC CNT: ABNORMAL
SPECIMEN SOURCE: SIGNIFICANT CHANGE UP

## 2024-07-16 NOTE — CHART NOTE - NSCHARTNOTEFT_GEN_A_CORE
Patient's wife counseled over the phone on heart failure diagnosis, medication indications, administration, and side effects. Also discussed fluid and salt restrictions, and maintaining a log of daily weights. Also discussed warning signs of fluid overload (SOB, orthopnea, OGLESBY, edema, etc.). Patient verbalized understanding. All questions and concerns were answered and addressed.      Sadia Giordano, PharmD, Clinical Pharmacy Specialist, [id number]

## 2024-07-16 NOTE — CHART NOTE - NSCHARTNOTESELECT_GEN_ALL_CORE
Discharge/HF Counseling/Post-Discharge Note
Event Note
FICKs numbers/Event Note
REENA numbers/Event Note
CCU/Event Note
Event Note
Event Note
hospital bed and gel overlay pad/Event Note
require commode/Event Note

## 2024-07-17 NOTE — PROGRESS NOTE ADULT - ATTENDING COMMENTS
Problem: Adult Inpatient Plan of Care  Goal: Plan of Care Review  Outcome: Progressing  Goal: Patient-Specific Goal (Individualized)  Outcome: Progressing  Goal: Absence of Hospital-Acquired Illness or Injury  Outcome: Progressing  Goal: Optimal Comfort and Wellbeing  Outcome: Progressing  Goal: Readiness for Transition of Care  Outcome: Progressing     Problem: Diabetes Comorbidity  Goal: Blood Glucose Level Within Targeted Range  Outcome: Progressing     Problem: Sepsis/Septic Shock  Goal: Optimal Coping  Outcome: Progressing  Goal: Absence of Bleeding  Outcome: Progressing  Goal: Blood Glucose Level Within Targeted Range  Outcome: Progressing  Goal: Absence of Infection Signs and Symptoms  Outcome: Progressing  Goal: Optimal Nutrition Intake  Outcome: Progressing     Problem: Acute Kidney Injury/Impairment  Goal: Fluid and Electrolyte Balance  Outcome: Progressing  Goal: Improved Oral Intake  Outcome: Progressing  Goal: Effective Renal Function  Outcome: Progressing     Problem: Infection  Goal: Absence of Infection Signs and Symptoms  Outcome: Progressing     Problem: Wound  Goal: Optimal Coping  Outcome: Progressing  Goal: Optimal Functional Ability  Outcome: Progressing  Goal: Absence of Infection Signs and Symptoms  Outcome: Progressing  Goal: Improved Oral Intake  Outcome: Progressing  Goal: Optimal Pain Control and Function  Outcome: Progressing  Goal: Skin Health and Integrity  Outcome: Progressing  Goal: Optimal Wound Healing  Outcome: Progressing     Problem: Skin Injury Risk Increased  Goal: Skin Health and Integrity  Outcome: Progressing     Problem: Fall Injury Risk  Goal: Absence of Fall and Fall-Related Injury  Outcome: Progressing      77M Hx Bipolar disorder, anxiety and BPH, and recent cardiogenic shock 2/2 IW NSTEMI p/w relative hypotension with new LV Thrombus now on heparin to coumadin bridge    #ADHF: improving on diuretics. c/w Aspirin 81mg,Clopidogrel 75mg, Metoprolol succinate 150mg daily and Losartan 25mg daily (with plans to eventually switch to Entresto). Atorvastatin was started but then held given rise in LFTs.   #CAD s/p IWSTEMI c/w DAPT, will discuss with cardiology about how long to continue triple therapy for this patient. LFts improved, start statin.  #LV thrombus: c/w heparin bride to coumadin. INR=1.77, dose coumadin 6mg Po tonight

## 2024-07-22 NOTE — ED ADULT NURSE NOTE - NS ED NOTE ABUSE RESPONSE YN
Initial urine test reveals trace blood and protein but no indicators for infection.  Urine culture is pending.  His fingerstick glucose (blood sugar) is 94 which is normal.    Please schedule follow-up with his pediatrician for recheck.    Current condition or diagnosis can change.  For any worsening symptoms, new symptoms, or other concerns, seek immediate medical attention with your Primary Care Provider or Urgent Care.  Go to the Emergency Department if an emergent situation is at all suspected.   
Yes

## 2024-07-30 ENCOUNTER — APPOINTMENT (OUTPATIENT)
Dept: CARDIOLOGY | Facility: CLINIC | Age: 79
End: 2024-07-30
Payer: MEDICARE

## 2024-07-30 VITALS
HEIGHT: 72 IN | BODY MASS INDEX: 20.32 KG/M2 | OXYGEN SATURATION: 95 % | DIASTOLIC BLOOD PRESSURE: 61 MMHG | RESPIRATION RATE: 16 BRPM | SYSTOLIC BLOOD PRESSURE: 96 MMHG | HEART RATE: 98 BPM | WEIGHT: 150 LBS

## 2024-07-30 DIAGNOSIS — I50.23 ACUTE ON CHRONIC SYSTOLIC (CONGESTIVE) HEART FAILURE: ICD-10-CM

## 2024-07-30 PROCEDURE — 99215 OFFICE O/P EST HI 40 MIN: CPT

## 2024-07-30 PROCEDURE — G2211 COMPLEX E/M VISIT ADD ON: CPT

## 2024-07-30 PROCEDURE — 93000 ELECTROCARDIOGRAM COMPLETE: CPT

## 2024-07-30 NOTE — CARDIOLOGY SUMMARY
[___] : [unfilled] [LVEF ___%] : LVEF [unfilled]% [de-identified] : 5/11/2022 normal sinus rhythm anteroseptal pattern anterior ST segment elevation  5/27/22 anterior mi AI lafb rsr pac rsr lafb qrsd 110 5/26/2024 sinus rhythm left axis deviation incomplete right bundle branch block  [de-identified] : 5/20/22 EF .3  LVT 1x1.3 cm decreased RV function RV Enlargement MARCOS  8/25/2022 cardiomyopathy with resolution of a left ventricular thrombus 4/7/2024 ejection fraction 25 to 30% severely decreased global function [de-identified] : minimal viability in infarcted territories [de-identified] : 5/11/22 lad .7 diag .7 RCA 1.00 circ normal

## 2024-07-30 NOTE — ASSESSMENT
[FreeTextEntry1] : 10/10/2023  Of concern was a recent echo demonstrating an ejection fraction in the 15% range which renders him a candidate for ICD placement. I have encouraged John to follow-up with Dr. Mathews in order to be considered for ICD placement. His medications are reviewed and clearly vasodilators remain a challenge given relative hypotension and symptoms such as dizziness. In the meantime agree with plans for vigorous diuretic administration and even subcutaneous administration of furosemide. John is reluctant to go back in the hospital for obvious reasons.    6/5/2024  we will make Bumex half a tab daily restrict salt fluid resuscitate return visit 2 weeks, CHF evaluation 4 weeks Dr. Hunt, weight down from 187-184 follow weights carefully  7/30/24 Would continue meds including dobutamine via infusion pump at prescribed doses with monitoring of weights and cardiac intervals. would also be in favor of a Cardio MEMS device given recurrent hospitalizations in the setting of heart failure which is disruptive to his lifestyle and would prefer home care is much as possible given advanced oncology and cardiac diagnoses.  will discuss with Dr. Jay Shell.  Salt restriction close monitoring of weights  I discussed with wife Kneia at bedside. Who is supportive   Medical necessity This is a high-risk encounter based upon medication review recommendations for CARDIO MEMS and assessment of advanced heart failure.

## 2024-07-30 NOTE — HISTORY OF PRESENT ILLNESS
[FreeTextEntry1] : This is a 78 y/o M with pmhx of Depression, bipolar, BPH, COVID Winter 2021(recovered), hx of STEMI and cardiogenic shock in may 2022, presented to the [facility] ED for new onset hemoptysis.  Known R lung nodule, suspicious for CA, pending IR biopsy. Patient with recent hospitalization where he had a STEMI 5/2022, found to have 100% occlusion RCA and 70% occlusion LAD, needed CCU for IABP support and then weaned off. Viability showing poor salvageability and so he was medically managed. Also during this time, TTE with EF 30%, LV thrombus for which he was started on Coumadin, DAPT. He was to continue Coumadin x 3 months, last dose up until 8/26 for which he was compliant.He was hospitalized at [facility] with hemoptysis . He underwent echo 8/25/22 that showed a cardiomyopathy and resolution of the left ventricular thrombus. I coordinated care with the Hospitalist service and warfarin was discontinued. He was discharged on aspirin alone.  He comes today for clarification of  his overall cardiovascular risk prior to a planned biopsy and surgical excision of a lung mass. He was advised to undergo a complete cardiac evaluation. He denies chest pains shortness of breath or loss of consciousnes.  1/24/2023 John is seen today after prolonged hospitalization we reinstituted heart failure meds in the setting of possible metastatic carcinoma.  Concern was raised regarding edema and possible thrombosis  4/18/23 4/13/223 John was recently seen in rehab he felt fatigue his blood pressure was 90/54 and reported lightheadedness his blood pressure dropped to 78/50  6/19/23 azalia Aquino bp 88/60. will make metoprolol once per day. hold bumetanide (prn dosing as noted) and entresto. Kenia/John will call with bp readings in am.  7/7/23  John returns from [place] he had occasional short lived bout of shortness of breath 2 to 3 days ago now resolved weight 178 on PRN dosing of diuretic bumetidine  10/10/202 John was in [place]. He suffered anasarca with scrotal edema and hospitalized for at least 6 to 7 days and was placed in ICU and diuresed.He returned to [place] and was seen by the heart failure team Dr. Shell. His medications were appropriately adjusted.   6/5/2024 I received an urgent call from his wife yesterday regarding hypotension and fatigue and poor urine output Bumex was held due to a probable overdiuresis and intravascular volume depletions. He did better after "bullion" and did not require ER eval.  his fluids were also had been restricted patient recently seen in hospital May 28 after a fall it turns out he bit his tongue and there was a fair amount of bleeding the police were summoned and he was brought to the hospital hemoglobin was 11.1 GFR 31 which is low CT of the abdomen cholelithiasis small ascites nuclear perfusion lung scan low probability ultrasound negative for DVT ultrasound abdomen cholelithiasis small ascites transthoracic echo ejection fraction 25 to 30%. discharged on aspirin 81 Bumex half tablet carvedilol 3.125 every 12 clopidogrel 75 rosuvastatin 20 mg   7/30/2024 Abimael comes today after multiple ER visits and hospitalizations. we reviewed blood work and creatinine has varied between 2.61,2.8 and 2.4 and GFR from 22 now 26 which is borderline. the QTc corrected is 416 down from 466. we note a left anterior fascicular block on EKG and John inquiries about lorazepam and Prozac. prefers "palliative care" which allows for a dobutamine infusion.

## 2024-08-01 NOTE — DIETITIAN INITIAL EVALUATION ADULT - CALCULATED TO (G/KG)
05.05.22 Last OV with Dr Kuhn    04.20.23 OV with Amina for dysphagia  05.02.23 ESMO with Dr Cason    07.29.24 went to Walk-In   \"Mid ABD left side pain ,dull , consistent ache ,sharp with palpation, LBM this am formed brown , denies any nausea chills denies bloating , tried Pepto bismol and pablo , appetite is fine .Patient was diagnosed with diverticulosis \"    Rx Cipro 500 mg x 14 days and Flagyl 500 mg every 6 hours x 14 days  Pt was advised to follow up with PCP.    Now requesting CT per GI provider.    112.98

## 2024-08-07 ENCOUNTER — TRANSCRIPTION ENCOUNTER (OUTPATIENT)
Age: 79
End: 2024-08-07

## 2024-08-23 ENCOUNTER — NON-APPOINTMENT (OUTPATIENT)
Age: 79
End: 2024-08-23

## 2024-08-27 ENCOUNTER — NON-APPOINTMENT (OUTPATIENT)
Age: 79
End: 2024-08-27

## 2024-08-27 ENCOUNTER — APPOINTMENT (OUTPATIENT)
Dept: HEART FAILURE | Facility: CLINIC | Age: 79
End: 2024-08-27
Payer: MEDICARE

## 2024-08-27 VITALS
HEART RATE: 85 BPM | HEIGHT: 72 IN | SYSTOLIC BLOOD PRESSURE: 92 MMHG | OXYGEN SATURATION: 94 % | DIASTOLIC BLOOD PRESSURE: 48 MMHG

## 2024-08-27 DIAGNOSIS — R06.09 OTHER FORMS OF DYSPNEA: ICD-10-CM

## 2024-08-27 DIAGNOSIS — I50.23 ACUTE ON CHRONIC SYSTOLIC (CONGESTIVE) HEART FAILURE: ICD-10-CM

## 2024-08-27 PROCEDURE — 99215 OFFICE O/P EST HI 40 MIN: CPT

## 2024-08-27 PROCEDURE — 93000 ELECTROCARDIOGRAM COMPLETE: CPT

## 2024-08-27 RX ORDER — LORAZEPAM 0.5 MG/1
0.5 TABLET ORAL DAILY
Refills: 0 | Status: ACTIVE | COMMUNITY
Start: 2024-08-27

## 2024-08-27 NOTE — CARDIOLOGY SUMMARY
[de-identified] : 6/21/24  NSR 94, LAFB, NSST 3/7/24 NSR with 1 st degree AVB 73, LAFB, NSST 1/4/24 NSR with 1 st degree AVB 61, LAFB, NSST 11/16/23 NSR 61 with 1 st degree AVB 61. LAFB, PRWP, NSST 11/2/23 NSR 68 with 1 st degree AVB 61. LAFB, PRWP, NSST 10/23/23 NSR 68 with 1 st degree AVB 63. LAFB, PRWP, NSST 10/16/23  NSR 74 with 1 st degree AVB 63. LAFB, PRWP, NSST 8/10/23 NSR 67 with 1 st degree AVB 63. LAFB, PRWP, NSST 5/4/23 NSR 67 with 1 st degree AVB 63. LAFB, PRWP, NSST 3/6/23 NSR with 1 st degree AVB 63. LAFB, PRWP, NSST 1/26/23 NSR with 1 st degree AVB 74. LAFB, PRWP, NSST 11/28/22 NSR with 1 st degree AVB 70. LAFB, PRWP, NSST 10/27/22  NSR 71,  LAFB, PRWP, NSST  10/13/22 NSR 98, LAFB, PRWP, NSST with PVC's [de-identified] : 7/31/23 TTE with LVEF 23%, LVIDD 6 cm, mod severe MR, mild diastolic dysfunction, severe LV systolic dysfunction, no LV thrombus, decreased RV systolic function  TTE 1/4/23 with LVEF 27%, LVIDD 6.5 cm , no LV thrombus, severe LV systolic dysfunction, decreased RV systolic dysfunction, severe MR, mild TR.    11/28/22 TTE; LVEF 22%, LVIDD 6.1 cm, mod MR, severe global LV systolic dysfunction, moderate diastolic dysfunction Stage II, with decreased RV systolic function, severe pHTN  9/24/22 TTE; LVEF 34%, LVIDD 6.1 cm, mod MR, mild AR, normal LA, severe global LV systolic dysfunction, moderate diastolic dysfunction Stage II, normal RV systolic function. mod TR, RV systolic function 82 mmHg c/q severe pHTN [de-identified] : The left ventricle was normal in size. The mid to distal\par  anterior, mid to distal anteroseptal, apical, and inferior\par  walls do not demonstrate significant viability. The\par  septum demonstrates mild viability. The remaining segments\par  are viable (predominantly the lateral wall only).\par  He had a viability study with reported left ventricle was normal in size. The mid to distal anterior, mid to distal anteroseptal, apical, and inferior walls do not demonstrate significant viability. The septum demonstrates mild viability. The remaining segments are viable (predominantly the lateral wall only). Discussed with interventional card Dr. Helms and opted to manage medically. Since there has been a decline in LVEF to 225, it was decided to repeat Cardiac viability to see if there were any areas that could be revascularized. [de-identified] : 9/2022 RHC: RA 15, PCWP 31, PA sat 59%, CO 3.63, CI 1.70, PVR 2.76.\par  9/2022:  s/p R/LHC with IABP support which showed  mid %, LAD 70%, D1 70% and RA 15,\par  PCWP 31, PA sat 59%, CO 3.63, CI 1.70, PVR 2.76; medical management\par  \par  Cardiac Catheterization (05.11.22 @ 15:16) >\par  Intra-aortic Balloon Pump\par  An intra-aortic balloon pump was inserted.\par  Diagnostic Findings:\par  Coronary Angiography\par  The coronary circulation is right dominant.\par  LM\par  Left main artery: Angiography shows no disease.\par  LAD\par  Proximal left anterior descending: There is a 70 % stenosis. First\par  diagonal: There is a 70 % stenosis.\par  Patient: ELYSE MALAVE MRN: [id number]\par  Study Date: 05/11/2022 03:16 PM Page 1 of 4\par  CX\par  Circumflex: Angiography shows no disease.\par  RCA\par  Mid right coronary artery: There is a 100 % stenosis.\par  Exam Start Time: 03:16 PM\par  Exam End Time: 03:53 PM\par  Exam Duration: 37 min\par  Patient: ELYSE MALAVE MRN: [id number]\par  Study Date: 05/11/2022 03:16 PM Page 2 of 4\par  Hemodynamic Pressures:\par  Phase Location [mmHg] [mmHg]\par  [mmHg] HR\par  Baseline LV s 115\par  ed 36 88\par  Baseline Ao s 126 d 85\par  m 102 103\par  Baseline RV s 50\par  ed 17 81\par  Baseline PA s 56 d 31\par  m 41 81\par  Baseline PCW a 32 v 34\par  m 31 77\par  Baseline RA a 20 v 16\par  m 15 95\par  \par   [de-identified] : 1/25/23 doppler of legs with no DVT bilaterally  [de-identified] : PET/CT 1/25/23. Dr. Au reviewed results with patient and his wife notable for hypermetabolic HARRY opacity highly suspicious for malignancy, small left pleural effusion.  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 small left pleural effusion on   1/3/2023- CTA chest showing mildly loculated R pleural effusion and a 1.7cm L upper lobe  pulmonary nodule

## 2024-08-27 NOTE — DISCUSSION/SUMMARY
[Patient] : the patient [FreeTextEntry2] : wife [FreeTextEntry3] : 4 weeks [FreeTextEntry1] : D/c K supplements. Cont spironolactone 25 mg qd. RTO in 8 weeks. [EKG obtained to assist in diagnosis and management of assessed problem(s)] : EKG obtained to assist in diagnosis and management of assessed problem(s)

## 2024-08-27 NOTE — HISTORY OF PRESENT ILLNESS
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  Patient stated his breathing is better, BLE slightly edematous. Weight continues to fluctuate as per wife. BP range: 90s/60s. No dizziness, Maintains <1.5L fluid intake per day.  At home, patient is able to walk short distances with walker d/t shortness of breath, fatigue. Able to do only a few steps. Denies orthopnea or PND, palpitations, syncope. Will have 1 more session of physical therapy but will start OT next. Having problems keeping neck upright. Also having difficulty holding a fork or pen. Appetite is fair. Sometimes gets nauseous eating too fast. Recent blood tests with no significant changes.        Course in hospital: Pt was BIBEMS to  ED 6/6/24 due to lethargy and hypotension. Was admitted to ICU for acute decompensated heart failure inthe setting of EF less than 20% with valvular disease and right heart failure, cardiogenic shock with worsening CKD, transaminitis and lactic acidosis. He was seen by cardiology and was deemed not a candidate for RVAD / LVAD based on history of lung cancer with new lung nodules and dobutamine initiated.   Since d/c, pt states his weight has been in the range of 189-190 lbs but has increased swelling in his legs and scrotum  with abdominal distention. Reports he has trouble sleeping with some orthopnea. [facility] care is coming to follow up with dobutamine gtt with PICC line dressing changes.  He was taken off most medications and is no longer on temazepam at night.   States he feels better since starting the dobutamine and can walk room to room without dyspnea.   Denies chest pain, palpitations, SOB, syncope or near syncope.

## 2024-08-27 NOTE — PHYSICAL EXAM
[No Carotid Bruit] : no carotid bruit [No Gallop] : no gallop [Non Tender] : non-tender [Frail] : frail [Ill-Appearing] : ill-appearing [Elevated JVD ____cm] : elevated JVD ~Vcm [Soft] : abdomen soft [Moves all extremities] : moves all extremities [Alert and Oriented] : alert and oriented [de-identified] : RRR with ectopy [de-identified] : Decreased at bases, mild resp distress [de-identified] : 1+ edema

## 2024-09-06 RX ORDER — BUMETANIDE 2 MG/1
2 TABLET ORAL TWICE DAILY
Qty: 180 | Refills: 2 | Status: ACTIVE | COMMUNITY
Start: 2024-08-27 | End: 1900-01-01

## 2024-09-06 NOTE — ASU DISCHARGE PLAN (ADULT/PEDIATRIC). - RETURN TO WORK
From: María Elena Hurtado  To: Margy Bhatia  Sent: 9/5/2024 6:29 PM CDT  Subject: Zepbound prescription     Hello, I want to know if I could get the zepbound 10.0. This isn’t working for me the 7.5. Can you please send in for the approval for the higher dosage? I will need a written prescription for the 10.0 to take to the pharmacy since I have to search for the medication. Please let me know. Thank you.   No

## 2024-09-07 ENCOUNTER — EMERGENCY (EMERGENCY)
Facility: HOSPITAL | Age: 79
LOS: 1 days | Discharge: ANOTHER HOSPITAL | End: 2024-09-07
Attending: EMERGENCY MEDICINE | Admitting: STUDENT IN AN ORGANIZED HEALTH CARE EDUCATION/TRAINING PROGRAM
Payer: MEDICARE

## 2024-09-07 VITALS
SYSTOLIC BLOOD PRESSURE: 91 MMHG | RESPIRATION RATE: 18 BRPM | HEART RATE: 66 BPM | DIASTOLIC BLOOD PRESSURE: 61 MMHG | OXYGEN SATURATION: 97 %

## 2024-09-07 VITALS
HEIGHT: 72 IN | HEART RATE: 68 BPM | WEIGHT: 164.91 LBS | OXYGEN SATURATION: 94 % | RESPIRATION RATE: 16 BRPM | DIASTOLIC BLOOD PRESSURE: 53 MMHG | SYSTOLIC BLOOD PRESSURE: 81 MMHG | TEMPERATURE: 96 F

## 2024-09-07 DIAGNOSIS — Z98.890 OTHER SPECIFIED POSTPROCEDURAL STATES: Chronic | ICD-10-CM

## 2024-09-07 LAB
ALBUMIN SERPL ELPH-MCNC: 2.7 G/DL — LOW (ref 3.3–5)
ALP SERPL-CCNC: 258 U/L — HIGH (ref 40–120)
ALT FLD-CCNC: 36 U/L — SIGNIFICANT CHANGE UP (ref 10–45)
ANION GAP SERPL CALC-SCNC: 19 MMOL/L — HIGH (ref 5–17)
ANISOCYTOSIS BLD QL: SLIGHT — SIGNIFICANT CHANGE UP
APTT BLD: 35.4 SEC — SIGNIFICANT CHANGE UP (ref 24.5–35.6)
AST SERPL-CCNC: 72 U/L — HIGH (ref 10–40)
BASOPHILS # BLD AUTO: 0.01 K/UL — SIGNIFICANT CHANGE UP (ref 0–0.2)
BASOPHILS NFR BLD AUTO: 0.2 % — SIGNIFICANT CHANGE UP (ref 0–2)
BILIRUB SERPL-MCNC: 2.5 MG/DL — HIGH (ref 0.2–1.2)
BUN SERPL-MCNC: 113 MG/DL — HIGH (ref 7–23)
CALCIUM SERPL-MCNC: 8.8 MG/DL — SIGNIFICANT CHANGE UP (ref 8.4–10.5)
CHLORIDE SERPL-SCNC: 87 MMOL/L — LOW (ref 96–108)
CO2 SERPL-SCNC: 18 MMOL/L — LOW (ref 22–31)
CREAT SERPL-MCNC: 4.6 MG/DL — HIGH (ref 0.5–1.3)
DACRYOCYTES BLD QL SMEAR: SLIGHT — SIGNIFICANT CHANGE UP
EGFR: 12 ML/MIN/1.73M2 — LOW
EOSINOPHIL # BLD AUTO: 0.01 K/UL — SIGNIFICANT CHANGE UP (ref 0–0.5)
EOSINOPHIL NFR BLD AUTO: 0.2 % — SIGNIFICANT CHANGE UP (ref 0–6)
GLUCOSE SERPL-MCNC: 86 MG/DL — SIGNIFICANT CHANGE UP (ref 70–99)
HCT VFR BLD CALC: 27.4 % — LOW (ref 39–50)
HGB BLD-MCNC: 9.2 G/DL — LOW (ref 13–17)
HYPOCHROMIA BLD QL: SLIGHT — SIGNIFICANT CHANGE UP
IMM GRANULOCYTES NFR BLD AUTO: 0.5 % — SIGNIFICANT CHANGE UP (ref 0–0.9)
INR BLD: 1.31 RATIO — HIGH (ref 0.85–1.18)
LACTATE SERPL-SCNC: 1.9 MMOL/L — SIGNIFICANT CHANGE UP (ref 0.7–2)
LIDOCAIN IGE QN: 57 U/L — SIGNIFICANT CHANGE UP (ref 16–77)
LYMPHOCYTES # BLD AUTO: 0.23 K/UL — LOW (ref 1–3.3)
LYMPHOCYTES # BLD AUTO: 4.1 % — LOW (ref 13–44)
MACROCYTES BLD QL: SLIGHT — SIGNIFICANT CHANGE UP
MCHC RBC-ENTMCNC: 29.5 PG — SIGNIFICANT CHANGE UP (ref 27–34)
MCHC RBC-ENTMCNC: 33.6 GM/DL — SIGNIFICANT CHANGE UP (ref 32–36)
MCV RBC AUTO: 87.8 FL — SIGNIFICANT CHANGE UP (ref 80–100)
MONOCYTES # BLD AUTO: 0.62 K/UL — SIGNIFICANT CHANGE UP (ref 0–0.9)
MONOCYTES NFR BLD AUTO: 11.1 % — SIGNIFICANT CHANGE UP (ref 2–14)
NEUTROPHILS # BLD AUTO: 4.67 K/UL — SIGNIFICANT CHANGE UP (ref 1.8–7.4)
NEUTROPHILS NFR BLD AUTO: 83.9 % — HIGH (ref 43–77)
NRBC # BLD: 0 /100 WBCS — SIGNIFICANT CHANGE UP (ref 0–0)
NT-PROBNP SERPL-SCNC: 5220 PG/ML — HIGH (ref 0–300)
PLAT MORPH BLD: NORMAL — SIGNIFICANT CHANGE UP
PLATELET # BLD AUTO: 88 K/UL — LOW (ref 150–400)
POLYCHROMASIA BLD QL SMEAR: SLIGHT — SIGNIFICANT CHANGE UP
POTASSIUM SERPL-MCNC: 5.2 MMOL/L — SIGNIFICANT CHANGE UP (ref 3.5–5.3)
POTASSIUM SERPL-SCNC: 5.2 MMOL/L — SIGNIFICANT CHANGE UP (ref 3.5–5.3)
PROT SERPL-MCNC: 7 G/DL — SIGNIFICANT CHANGE UP (ref 6–8.3)
PROTHROM AB SERPL-ACNC: 14.8 SEC — HIGH (ref 9.5–13)
RBC # BLD: 3.12 M/UL — LOW (ref 4.2–5.8)
RBC # FLD: 20.1 % — HIGH (ref 10.3–14.5)
RBC BLD AUTO: ABNORMAL
SCHISTOCYTES BLD QL AUTO: SLIGHT — SIGNIFICANT CHANGE UP
SODIUM SERPL-SCNC: 124 MMOL/L — LOW (ref 135–145)
TROPONIN I, HIGH SENSITIVITY RESULT: 148.8 NG/L — HIGH
WBC # BLD: 5.57 K/UL — SIGNIFICANT CHANGE UP (ref 3.8–10.5)
WBC # FLD AUTO: 5.57 K/UL — SIGNIFICANT CHANGE UP (ref 3.8–10.5)

## 2024-09-07 PROCEDURE — 70450 CT HEAD/BRAIN W/O DYE: CPT | Mod: 26,MC

## 2024-09-07 PROCEDURE — 70450 CT HEAD/BRAIN W/O DYE: CPT | Mod: MC

## 2024-09-07 PROCEDURE — 93010 ELECTROCARDIOGRAM REPORT: CPT

## 2024-09-07 PROCEDURE — 83605 ASSAY OF LACTIC ACID: CPT

## 2024-09-07 PROCEDURE — 85025 COMPLETE CBC W/AUTO DIFF WBC: CPT

## 2024-09-07 PROCEDURE — 99291 CRITICAL CARE FIRST HOUR: CPT | Mod: 25

## 2024-09-07 PROCEDURE — 85730 THROMBOPLASTIN TIME PARTIAL: CPT

## 2024-09-07 PROCEDURE — 80053 COMPREHEN METABOLIC PANEL: CPT

## 2024-09-07 PROCEDURE — 93005 ELECTROCARDIOGRAM TRACING: CPT

## 2024-09-07 PROCEDURE — 71045 X-RAY EXAM CHEST 1 VIEW: CPT | Mod: 26

## 2024-09-07 PROCEDURE — 36415 COLL VENOUS BLD VENIPUNCTURE: CPT

## 2024-09-07 PROCEDURE — 99291 CRITICAL CARE FIRST HOUR: CPT

## 2024-09-07 PROCEDURE — 85610 PROTHROMBIN TIME: CPT

## 2024-09-07 PROCEDURE — 83880 ASSAY OF NATRIURETIC PEPTIDE: CPT

## 2024-09-07 PROCEDURE — 83690 ASSAY OF LIPASE: CPT

## 2024-09-07 PROCEDURE — 71045 X-RAY EXAM CHEST 1 VIEW: CPT

## 2024-09-07 PROCEDURE — 84484 ASSAY OF TROPONIN QUANT: CPT

## 2024-09-07 NOTE — ED PROVIDER NOTE - CLINICAL SUMMARY MEDICAL DECISION MAKING FREE TEXT BOX
Patient who is 79-year-old patient with history of HFrEF on home dobutamine drip with last EF less than 20%, CAD presenting with transfer from [facility] for heart failure exacerbation management.  On arrival patient declines any symptoms.  Blood pressure is 82/58, patient and wife state he normally runs at 90s over 60s and has not heard of for him to be this low.  He is mentating appropriately he is alert and oriented.  He is hypothermic to 94.5.  He is placed on a Jaylyn hugger.  We will evaluate with cardiology and likely CCU consult and eventually get nephrology involved.  Given he is edematous on exam suspect this is some episodes of heart failure exacerbation which is why he is transferred here for further management.  Also on the differential is sepsis versus.  He also has an Kristian with Cr 4.6 so we will need to involve nephrology for further management and appropriate diuresis Cyclophosphamide Counseling:  I discussed with the patient the risks of cyclophosphamide including but not limited to hair loss, hormonal abnormalities, decreased fertility, abdominal pain, diarrhea, nausea and vomiting, bone marrow suppression and infection. The patient understands that monitoring is required while taking this medication.

## 2024-09-07 NOTE — PATIENT PROFILE ADULT - FUNCTIONAL ASSESSMENT - BASIC MOBILITY 6.
3-calculated by average/Not able to assess (calculate score using [facility] averaging method)

## 2024-09-07 NOTE — ED PROVIDER NOTE - PHYSICAL EXAMINATION
GENERAL: Awake, alert, NAD  HEAD: NC/AT, neck supple, moist mucous membranes  EYES: PERRL, EOM grossly intact, sclera anicteric  LUNGS: normal WOB on RA, CTAB, no wheezes or crackles   CARDIAC: RRR, no m/r/g  ABDOMEN: Soft, non tender, non distended, no rebound, no guarding  EXT: 2+ pitting edema bilaterally,   no deformities.  NEURO: A&Ox3. Moving all extremities.  SKIN: Warm and dry. No rash.  PSYCH: Normal affect.

## 2024-09-07 NOTE — H&P ADULT - ASSESSMENT
Assessment:  79-year-old male with a history of HFrEF on home dobutamine 7.5 with last EF less than 20%, CAD, lungs mass - SCC, and urinary retention presented transferred from clinical hospital for management failure team after having syncopal episode last night, admitted for cardiogenic shock. Assessment:  79-year-old male with a history of HFrEF on home dobutamine 7.5 with last EF less than 20%, CAD, lungs mass - SCC, and urinary retention presented transferred from clinical hospital for management failure team after having syncopal episode last night, admitted for cardiogenic shock.    Neuro  - A&0 x3  - continue to monitor mental status per protocol    Respiratoy  #hx lung mass, SCC  - saturating well on room air  - continue to monitor sp02    Cardiovascular  #HFrEF (last know EF%), acute decompensated heart failure  - on home dobutamine @ 7.5 mcg  - worsening perfusion indices with minimal UO  - swan to be placed, f/u mv02 and lactate  - s/p bumex bolus, will give diuril and start bumex gtt  - borderline BPs without room for afterload reduction  - continue strict I&O  - continue to trend lactate and perfusion indices    Renal  BRODY on CKD  - sCr >4, baseline usually around 2  - likely i/s/o ADHF  - trial diuresis, trend lytes closely, monitor for need of CVV  - strict I&O, trend renal function daily    GI  - diet as tolerated  - monitor for BM    Endo  - f/u a1c, trend glucose on cmp  - f/u tsh and lipid profile    Heme  - H/H stable  - platelets low  but appears chronic  - continue to trend  DVT ppx: heparin subq    ID  - hypothermic on admission  - f/u infectious w/u  - continue to trend wbc and fever curve    #molst not with patient on this admission, spoke with patient who wishes to be full code  Lines: PIVs, cordis to be placed    ======================= DISPOSITION  =====================  [X] Critical   [ ] Guarded    [ ] Stable    [X] Maintain in CICU  [ ] Downgrade to Telemetry  [ ] Discharge Home    Patient requires continuous monitoring with bedside rhythm monitoring, pulse ox monitoring, and intermittent blood gas analysis. Care plan discussed with ICU care team. Patient remained critical and at risk for life threatening decompensation.  Patient seen, examined and plan discussed with CCU team during rounds.     I have personally provided 75 minutes of critical care time excluding time spent on separate procedures, in addition to initial critical care time provided by the CICU Attending, Dr. Wheeler.    Ebony Lopez, [facility]-BC Assessment:  79-year-old male with a history of HFrEF on home dobutamine 7.5 with last EF less than 20%, CAD, lungs mass - SCC, and urinary retention presented transferred from clinical hospital for management failure team after having syncopal episode last night, admitted for cardiogenic shock.    Neuro  - A&0 x3  - continue to monitor mental status per protocol    Respiratoy  #hx lung mass, SCC  - saturating well on room air  - continue to monitor sp02    Cardiovascular  #HFrEF (last know EF%), acute decompensated heart failure  - on home dobutamine @ 7.5 mcg  - worsening perfusion indices with minimal UO  - swan to be placed, f/u mv02 and lactate  - s/p bumex bolus, will give diuril and start bumex gtt  - borderline BPs without room for afterload reduction  - holding GDMT i/s/o shock  - continue strict I&O  - continue to trend lactate and perfusion indices    #CAD  - on aspirin and plavix at home  - continue    Renal  BRODY on CKD  - sCr >4, baseline usually around 2  - likely i/s/o cardiogenic shock  - trial diuresis, trend lytes closely, monitor for need of CVV  - strict I&O, trend renal function daily    GI  - diet as tolerated  - monitor for BM    Endo  - f/u a1c, trend glucose on cmp  - f/u tsh and lipid profile    Heme  - H/H stable  - platelets low  but appears chronic  - continue to trend  DVT ppx: heparin subq    ID  - hypothermic on admission  - f/u infectious w/u  - continue to trend wbc and fever curve    #molst not with patient on this admission, spoke with patient who wishes to be full code  Lines: PIVs, cordis to be placed    ======================= DISPOSITION  =====================  [X] Critical   [ ] Guarded    [ ] Stable    [X] Maintain in CICU  [ ] Downgrade to Telemetry  [ ] Discharge Home    Patient requires continuous monitoring with bedside rhythm monitoring, pulse ox monitoring, and intermittent blood gas analysis. Care plan discussed with ICU care team. Patient remained critical and at risk for life threatening decompensation.  Patient seen, examined and plan discussed with CCU team during rounds.     I have personally provided 75 minutes of critical care time excluding time spent on separate procedures, in addition to initial critical care time provided by the CICU Attending, Dr. Wheeler.    WILLIE BurgosDICK-BC Assessment:  79-year-old male with a history of HFrEF on home dobutamine 7.5 with last EF less than 20%, CAD, lungs mass - SCC, and urinary retention presented transferred from clinical hospital for management failure team after having syncopal episode last night, admitted for cardiogenic shock.    Neuro  - A&0 x3  - CT head post syncopal episode with no acute injury  - continue to monitor mental status per protocol    Respiratoy  #hx lung mass, SCC  - saturating well on room air  - continue to monitor sp02    Cardiovascular  #HFrEF (last know EF%), acute decompensated heart failure  - on home dobutamine @ 7.5 mcg  - worsening perfusion indices with minimal UO  - swan to be placed, f/u mv02 and lactate  - s/p bumex bolus, will give diuril and start bumex gtt  - borderline BPs without room for afterload reduction  - holding GDMT i/s/o shock  - continue strict I&O  - continue to trend lactate and perfusion indices    #CAD  - on aspirin and plavix at home  - continue    Renal  BRODY on CKD  - sCr >4, baseline usually around 2  - likely i/s/o cardiogenic shock  - trial diuresis, trend lytes closely, monitor for need of CVV  - strict I&O, trend renal function daily    GI  - diet as tolerated  - monitor for BM    Endo  - f/u a1c, trend glucose on cmp  - f/u tsh and lipid profile    Heme  - H/H stable  - platelets low  but appears chronic  - continue to trend  DVT ppx: heparin subq    ID  - hypothermic on admission  - f/u infectious w/u  - continue to trend wbc and fever curve    #molst not with patient on this admission, spoke with patient who wishes to be full code  Lines: PIVs, cordis to be placed    ======================= DISPOSITION  =====================  [X] Critical   [ ] Guarded    [ ] Stable    [X] Maintain in CICU  [ ] Downgrade to Telemetry  [ ] Discharge Home    Patient requires continuous monitoring with bedside rhythm monitoring, pulse ox monitoring, and intermittent blood gas analysis. Care plan discussed with ICU care team. Patient remained critical and at risk for life threatening decompensation.  Patient seen, examined and plan discussed with CCU team during rounds.     I have personally provided 75 minutes of critical care time excluding time spent on separate procedures, in addition to initial critical care time provided by the CICU Attending, Dr. Wheeler.    Ebony Lopez [facility]-BC

## 2024-09-07 NOTE — H&P ADULT - HISTORY OF PRESENT ILLNESS
79-year-old male with a history of HFrEF on home dobutamine 7.5 with last EF less than 20%, CAD, lungs mass - SCC, and urinary retention presented transferred from [facility] hospital for management failure team after having syncopal episode last night.  Patient also having reduced urine output despite 6 mg of Bumex twice a day.  At [facility] patient had a workup which was notable for sodium of 124, creatinine to 4.6 from his baseline of approximately 2-2.1, proBNP 5220, lactate 1.9, lipase 57 he also had a head CT that was negative for any acute injury.  He was transferred here for further evaluation and  heart failure management, admitted to CICU for further management.    On admission to CICU he is A&O, saturating well on room air, sinus rhythm 75 /59. He denies dizziness, chest pain, sob, N&V on admission. 79-year-old male with a history of HFrEF on home dobutamine 7.5 with last EF less than 20%, CAD, lungs mass - SCC, and urinary retention presented transferred from [facility] for management of heart failure after having syncopal episode last night.  Patient also having reduced urine output despite 6 mg of Bumex twice a day.  At [facility] patient had a workup which was notable for sodium of 124, creatinine to 4.6 from his baseline of approximately 2-2.1, proBNP 5220, lactate 1.9.  He was transferred here for further evaluation and  heart failure management, admitted to CICU.    On admission to CICU he is A&O, saturating well on room air, sinus rhythm 75 /59. He denies dizziness, chest pain, sob, N&V on admission.

## 2024-09-07 NOTE — ED ADULT TRIAGE NOTE - CHIEF COMPLAINT QUOTE
Transfer from [place] for cards   Syncopal episode last night a/w hypotension, decreased urinary output and b/l leg swelling   On a dobutamine drip @7.9mL/hr Transfer from [place] for cards   Syncopal episode this morning a/w hypotension, decreased urinary output and b/l leg swelling   On a standing dobutamine drip @7.9mL/hr

## 2024-09-07 NOTE — H&P ADULT - NSHPLABSRESULTS_GEN_ALL_CORE
9.2    5.56  )-----------( 83       ( 07 Sep 2024 22:56 )             27.6   09-07    123<L>  |  88<L>  |  107<H>  ----------------------------<  70  5.6<H>   |  14<L>  |  4.68<H>    Ca    8.9      07 Sep 2024 22:57  Phos  7.6     09-07  Mg     2.7     09-07    TPro  7.0  /  Alb  3.2<L>  /  TBili  2.3<H>  /  DBili  x   /  AST  61<H>  /  ALT  29  /  AlkPhos  249<H>  09-07

## 2024-09-07 NOTE — ED PROVIDER NOTE - OBJECTIVE STATEMENT
You eat 79-year-old male with a history of HFrEF on home dobutamine drip with last EF less than 20%, CAD presented transferred from clinical hospital for management failure team after having syncopal episode last night.  Patient also having reduced urine output despite 6 mg of Bumex twice a day.  At [facility] patient had a workup which was notable for sodium of 124, creatinine to 4.6 from his baseline of approximately 2-2.1, proBNP 5220, lactate 1.9, lipase 57 he also had a head CT that was negative for any acute injury.  He was transferred here for further evaluation and management.  Patient currently denies any symptoms but specifically denies fever, cough, nausea, vomiting, chest pain, shortness of breath, dizziness. Bladder scan showed 100 cc's.

## 2024-09-07 NOTE — ED PROVIDER NOTE - CLINICAL SUMMARY MEDICAL DECISION MAKING FREE TEXT BOX
79-year-old male with worsening shortness of breath, on dobutamine drip at home, bladder scan revealed 100s cc, pt has not been urinated since last night, labs reviewed- worsening BRODY on CKD, spoke with ICU, Dr. Espinal, Patient would benefit from heart failure team at [place], will transfer

## 2024-09-07 NOTE — ED PROVIDER NOTE - OBJECTIVE STATEMENT
79-year-old male with syncopal episode.  Patient has end-stage heart failure, on dobutamine drip at home, as per wife, patient had a rough night last night, with decreased urine output.  wife found patient noting next to the bed, not responsive, brought him in.  Patient denies any fever, cough, nausea vomiting diarrhea.  Denies any other symptoms.

## 2024-09-07 NOTE — ED ADULT NURSE REASSESSMENT NOTE - NS ED NURSE REASSESS COMMENT FT1
PICC line flushing with resistance and no blood return present. Dobutamine drip initiated now infusing via PIV to RUE. MD Tesfaye made aware. pending CXR.

## 2024-09-07 NOTE — ED ADULT NURSE NOTE - OBJECTIVE STATEMENT
80yo M A&ox4, pmhx chronic heart failure on dobutamine drip at home via PICC to [facility], on plavix and aspirin, presents to ED brought in by EMS transferred from [facility] for admission to heart failure team. per EMS report pt presented to [place] ED this morning for suspected syncopal event this morning. wife at bedside reports she noticed pt had decreased urine output last night then had an episode of not responding this morning. pt and wife deny and fall or head strike during this episode. pt denies any confusion after regaining responsiveness. pt currently denying any associated pain or complaints. pt denies any dizziness, chest pain, palpitations, shortness of breath, abd pain, fevers, nausea, vomiting, or diarrhea. per EMS pt transferred to [facility] because his heart failure doctors are here. pt arrives to ED with dobutamine drip infusing via personal pump through PICC to [facility], infusing at 7.9mL/hr; wife reports pt's BP runs low at 80s-90s systolic. 18g PIV noted to RUE placed at [facility]. pt appears comfortable resting awake and alert, speaking in full sentences, breathing even and unlabored on RA, moving all extremities. pt hypotensive to 80s/50s, vital signs otherwise stable. pt 80yo M A&ox4, pmhx chronic heart failure on dobutamine drip at home via PICC to [facility], on plavix and aspirin, presents to ED brought in by EMS transferred from [facility] for admission to heart failure team. per EMS report pt presented to [place] ED this morning for suspected syncopal event this morning. wife at bedside reports she noticed pt had decreased urine output last night then had an episode of not responding this morning. pt and wife deny and fall or head strike during this episode. pt denies any confusion after regaining responsiveness. pt currently denying any associated pain or complaints. pt denies any dizziness, chest pain, palpitations, shortness of breath, abd pain, fevers, nausea, vomiting, or diarrhea. per EMS pt transferred to [facility] because his heart failure doctors are here. pt arrives to ED with dobutamine drip infusing via personal pump through PICC to [facility], infusing at 7.9mL/hr; wife reports pt's BP runs low at 80s-90s systolic. 18g PIV noted to RUE placed at [facility]. pt appears comfortable resting awake and alert, speaking in full sentences, breathing even and unlabored on RA, moving all extremities. pt hypotensive to 80s/50s, vital signs otherwise stable. abdomen soft nontender nondistended, Patient undressed and placed into gown, call bell placed within reach, bed locked in lowest position. MD at bedside for exam. 80yo M A&ox4, pmhx chronic heart failure on dobutamine drip at home via PICC to [facility], on plavix and aspirin, presents to ED brought in by EMS transferred from [facility] for admission to heart failure team. per EMS report pt presented to [place] ED this morning for suspected syncopal event this morning. wife at bedside reports she noticed pt had decreased urine output last night then had an episode of not responding this morning. pt and wife deny and fall or head strike during this episode. pt denies any confusion after regaining responsiveness. pt currently denying any associated pain or complaints. pt denies any dizziness, chest pain, palpitations, shortness of breath, abd pain, fevers, nausea, vomiting, or diarrhea. per EMS pt transferred to [facility] because his heart failure doctors are here. pt arrives to ED with dobutamine drip infusing via personal pump through PICC to [facility], infusing at 7.9mL/hr; wife reports pt's BP runs low at 80s-90s systolic. 18g PIV noted to RUE placed at [facility]. pt appears comfortable resting awake and alert, speaking in full sentences, breathing even and unlabored on RA, moving all extremities. pt BP 80s/50s. pt found to be hypothermic rectally. Fredy hugger in place. abdomen soft nontender nondistended, Patient undressed and placed into gown, call bell placed within reach, bed locked in lowest position. MD at bedside for exam.

## 2024-09-07 NOTE — ED ADULT NURSE NOTE - NSFALLRISKINTERV_ED_ALL_ED
Assistance OOB with selected safe patient handling equipment if applicable/Assistance with ambulation/Communicate fall risk and risk factors to all staff, patient, and family/Encourage patient to sit up slowly, dangle for a short time, stand at bedside before walking/Monitor gait and stability/Monitor for mental status changes and reorient to person, place, and time, as needed/Orthostatic vital signs/Provide visual cue: yellow wristband, yellow gown, etc/Reinforce activity limits and safety measures with patient and family/Review medications for side effects contributing to fall risk/Toileting schedule using arm’s reach rule for commode and bathroom/Use of alarms - bed, stretcher, chair and/or video monitoring/Call bell, personal items and telephone in reach/Instruct patient to call for assistance before getting out of bed/chair/stretcher/Non-slip footwear applied when patient is off stretcher/[place] to call system/Physically safe environment - no spills, clutter or unnecessary equipment/Purposeful Proactive Rounding/Room/bathroom lighting operational, light cord in reach

## 2024-09-07 NOTE — ED PROVIDER NOTE - CARE PLAN
1 Principal Discharge DX:	Acute exacerbation of CHF (congestive heart failure)  Secondary Diagnosis:	BRODY (acute kidney injury)  Secondary Diagnosis:	Acute kidney injury superimposed on CKD

## 2024-09-07 NOTE — PATIENT PROFILE ADULT - FALL HARM RISK - HARM RISK INTERVENTIONS

## 2024-09-07 NOTE — H&P ADULT - NSHPPHYSICALEXAM_GEN_ALL_CORE
Physical Exam:   Constitutional: NAD, well-groomed, well-developed  HEENT: PERRLA, EOMI, no drainage or redness  Neck:  JVD  Respiratory: tachypnic, diminished  Cardiovascular: Regular rate, regular rhythm, normal S1, S2; no murmurs or rub  Gastrointestinal: Soft, non-tender, non distended, + bowel sounds  Extremities: GARCIA x 4, lower extremity edema, no cyanosis, no clubbing. +peripheral pulses.   Neurological: A+O x 3; speech clear and intact; no sensory, motor  deficits, normal reflexes. Nonfocal.   Skin: warm, dry

## 2024-09-07 NOTE — ED ADULT NURSE NOTE - NSFALLHARMRISKINTERV_ED_ALL_ED

## 2024-09-07 NOTE — ED ADULT NURSE NOTE - NS ED NURSE TRANSPORT WITH
ED RN, EDT, and CICU provider at bedside for transport/Cardiac Monitor/Defib/ACLS/Rescue Kit/O2/BVM/IV pump

## 2024-09-07 NOTE — ED PROVIDER NOTE - MUSCULOSKELETAL, MLM
Spine appears normal, range of motion is not limited, no muscle or joint tenderness. 2+ pitting edema

## 2024-09-07 NOTE — PROCEDURE NOTE - NSCOMPLICATION_GEN_A_CORE
Patient c/o of abdominal pain 10/10 on pain scale.    PRN Morphine given per [facility] no complications

## 2024-09-07 NOTE — ED ADULT NURSE NOTE - OBJECTIVE STATEMENT
Patient presents to ED from home by EMS for evaluation of syncope/hypotension and decreased urine output from home. Patient with history of heart failure currently on dobutamine drip, Dobutamine 4mg/mL at 7.9mL an hour. Has approximately 10hours left of run time in home pump. PICC noted to left upper arm. EMS PIV to right arm. Alert and oriented, though drowsy. HRR to osvaldo, hypotension noted. Abdomen soft, nontender. Bladder scan  less than 130. Significant swelling to lower extremties, worsening in the last week. 10lb weight gain per wife in the past week. Per family and patient, no pressure ulcers, though due to hypotension, unable to assess posterior skin at this time.

## 2024-09-07 NOTE — ED PROVIDER NOTE - PROGRESS NOTE DETAILS
Jose Tesfaye MD PGY-1: Cardiology aware of patient, to see and evaluate in a CCU capacity. Attempted to reach Nephrology team patient sees but unable to reach at this time. Jose Tesfaye MD PGY-1: Dobutamine ordered at patients home dose. Mary PGY3: Patient accepted for CCU admission

## 2024-09-07 NOTE — ED PROVIDER NOTE - NSDESTINATION_ED_A_ED
Pending Prescriptions:                       Disp   Refills    metFORMIN (GLUCOPHAGE) 500 MG tablet [Pha*90 tab*3            Sig: TAKE 1 TABLET DAILY WITH DINNER    Medication is being filled for 1 time job refill only due to:  Patient is due for DM check.     Please call and help schedule.  Thank you!           [facility]

## 2024-09-07 NOTE — ED ADULT NURSE NOTE - CHIEF COMPLAINT QUOTE
Transfer from [place] for cards   Syncopal episode this morning a/w hypotension, decreased urinary output and b/l leg swelling   On a standing dobutamine drip @7.9mL/hr

## 2024-09-07 NOTE — ED ADULT NURSE REASSESSMENT NOTE - NS ED NURSE REASSESS COMMENT FT1
Report completed to Kaykay at [facility]. Dobutamine drip continues to run at 7.9mL/HR via LUE PICC. No additional meds or orders at this time.
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
## 2024-09-07 NOTE — ED PROVIDER NOTE - ATTENDING CONTRIBUTION TO CARE
78 yo M with PMHx of CAD s/p stents, CHF on dobutamine gtt at home, CKD presents as a transfer for worsening CHF/CHF exacerbation. He reports generalized abd distention, leg swelling and scrotal swelling. He reports compliance with Bumex and CHF meds as well. Reports decreased UOP over the past few days  PE: well appearing, nontoxic, no respiratory distress. Generalized abdominal distention, 2+ pitting edema. Neuro nonfocal.  Skin intact. Psych normal mood.    MDM: Differential diagnosis includes but is not limited to ACS, CHF exacerbation, ARF  Will consult cardiology for recommendations and nephrology   Patient will require hospital admission   EKG with no ischemic changes

## 2024-09-08 NOTE — PROGRESS NOTE ADULT - ASSESSMENT
79-year-old male with a history of HFrEF on home dobutamine 7.5 with last EF less than 20%, CAD, lungs mass - SCC, and urinary retention presented transferred from clinical hospital for management failure team after having syncopal episode last night, admitted for cardiogenic shock.    Neuro  - A&0 x3  - CT head post syncopal episode with no acute injury  - continue to monitor mental status per protocol    Respiratoy  #hx lung mass, SCC c/b fluid overload  - Required CPAP 10/5 50% now weaned off to NC 2 L  - saturating well   - continue to monitor sp02    Cardiovascular  #HFrEF (last know EF%), acute decompensated heart failure  - on home dobutamine @ 7.5 mcg increased to 10 mcg given low Venous sat of 53% which gives him a CI of 2.3   - worsening perfusion indices with minimal UO now with CRRT 100 net fluid removal. CVP 25. He was on a bumex gtt and did start making UO > 100/hour prior to initiation of CRRT. WIll continue CRRT for now for more aggressive fluid removal and correction of metabolic derangements.   - Unable to float swan however has a cordis for monitoring of venous sats  - borderline BPs without room for afterload reduction  - holding GDMT i/s/o shock  - continue strict I&O  - continue to trend lactate and perfusion indices    #CAD  - on aspirin and plavix at home  - continue    Renal  BRODY on CKD  - sCr >4, baseline usually around 2  - likely i/s/o cardiogenic shock  - trial diuresis, trend lytes closely, monitor for need of CVV  - strict I&O, trend renal function daily    GI  - diet as tolerated  - monitor for BM    Endo  - f/u a1c, trend glucose on cmp  - f/u tsh and lipid profile    Heme  - H/H stable  - platelets low  but appears chronic  - continue to trend  DVT ppx: heparin subq    ID  - hypothermic on admission  - f/u infectious w/u  - continue to trend wbc and fever curve  - Started on Vanco and cefepime empirically given hypothermia.   - Will remove home PICC line.     #molst not with patient on this admission, spoke with patient who wishes to be full code. On last admission he chose to be DNR and had wished to not return to the hospital.     Lines: PIVs, LFV Jennifer and JENNIE Cordcristóbal from 9/8    ======================= DISPOSITION  =====================  [X] Critical   [ ] Guarded    [ ] Stable    [X] Maintain in CICU  [ ] Downgrade to Telemetry  [ ] Discharge Home 79-year-old male with a history of HFrEF on home dobutamine 7.5 with last EF less than 20%, CAD, lungs mass - SCC, and urinary retention presented transferred from clinical hospital for management failure team after having syncopal episode last night, admitted for cardiogenic shock.    Neuro  - A&0 x3  - CT head post syncopal episode with no acute injury  - continue to monitor mental status per protocol    Respiratoy  #hx lung mass, SCC c/b fluid overload  - Required CPAP 10/5 50% now weaned off to NC 2 L  - saturating well   - continue to monitor sp02    Cardiovascular  #HFrEF (last know EF%), acute decompensated heart failure  - on home dobutamine @ 7.5 mcg increased to 10 mcg given low Venous sat of 53% which gives him a CI of 2.3   - worsening perfusion indices with minimal UO now with CRRT 100 net fluid removal. CVP 25. He was on a bumex gtt and did start making UO > 100/hour prior to initiation of CRRT. WIll continue CRRT for now for more aggressive fluid removal and correction of metabolic derangements.   - Unable to float swan however has a cordis for monitoring of venous sats  - borderline BPs without room for afterload reduction  - holding GDMT i/s/o shock  - continue strict I&O  - continue to trend lactate and perfusion indices    #CAD  - on aspirin and plavix at home  - continue    Renal  BRODY on CKD  - sCr >4, baseline usually around 2  - likely i/s/o cardiogenic shock  - strict I&O, trend renal function daily    GI  - diet as tolerated  - monitor for BM    Endo  - f/u a1c, trend glucose on cmp  - f/u tsh and lipid profile    Heme  - H/H stable  - platelets low  but appears chronic  - continue to trend  DVT ppx: heparin subq    ID  - hypothermic on admission  - f/u infectious w/u  - continue to trend wbc and fever curve  - Started on Vanco and cefepime empirically given hypothermia.   - Will remove home PICC line.     #molst not with patient on this admission, spoke with patient who wishes to be full code. On last admission he chose to be DNR and had wished to not return to the hospital.     Lines: PIVs, LFV Shidorys and RIJ Cordis from 9/8    ======================= DISPOSITION  =====================  [X] Critical   [ ] Guarded    [ ] Stable    [X] Maintain in CICU  [ ] Downgrade to Telemetry  [ ] Discharge Home

## 2024-09-08 NOTE — CONSULT NOTE ADULT - SUBJECTIVE AND OBJECTIVE BOX
Patient is a 79y old  Male who presents with a chief complaint of cardiogenic shock (08 Sep 2024 06:57)    HPI:  79-year-old male with a history of HFrEF on home dobutamine 7.5 with last EF less than 20%, CAD, lungs mass - SCC, and urinary retention presented transferred from [facility] for management of heart failure after having syncopal episode last night.  Patient also having reduced urine output despite 6 mg of Bumex twice a day.  At [facility] patient had a workup which was notable for sodium of 124, creatinine to 4.6 from his baseline of approximately 2-2.1, proBNP 5220, lactate 1.9.  He was transferred here for further evaluation and  heart failure management, admitted to CICU.    On admission to CICU he is A&O, saturating well on room air, sinus rhythm 75 /59. He denies dizziness, chest pain, sob, N&V on admission. (07 Sep 2024 23:06)      PAST MEDICAL HISTORY:  BPH (benign prostatic hyperplasia)    Bipolar disorder    Depression    Anxiety    Umbilical hernia, incarcerated    Inguinal hernia of right side without obstruction or gangrene    Depression    Constipation    Urinary retention    CAD (coronary artery disease)    SCC (squamous cell carcinoma)    Lung mass    Other nonspecific abnormal finding of lung field    LV (left ventricular) mural thrombus    History of inguinal hernia    Severe left ventricular systolic dysfunction    History of CHF (congestive heart failure)    CHF, chronic    Heart failure with reduced ejection fraction        PAST SURGICAL HISTORY:  Anxiety    Depression    BPH (benign prostatic hyperplasia)    History of arthroscopy of knee    History of tonsillectomy    History of hernia repair    H/O coronary angiogram        FAMILY HISTORY:  Family history of depression (Mother)    FH: CAD (coronary artery disease) (Sibling, Sibling)    Family history of diabetes mellitus (DM) (Sibling)        SOCIAL HISTORY:    Allergies    No Known Allergies    Intolerances    atorvastatin (Muscle Pain (Severe))    Home Medications:  aspirin 81 mg oral delayed release tablet: 1 tab(s) orally once a day (07 Sep 2024 11:24)  clopidogrel 75 mg oral tablet: 1 tab(s) orally once a day (07 Sep 2024 11:24)  Colace 100 mg oral capsule: 1 cap(s) orally once a day (07 Sep 2024 11:24)  DOBUTamine: 7.9 milliliter(s) by continuous intravenous infusion once a day 1000mg in 250cc, infuse at 7.9ml/hr continuously daily, no stop time (07 Sep 2024 11:20)  LORazepam 0.5 mg oral tablet: 1 tab(s) orally once a day as needed for  anxiety (07 Sep 2024 11:24)  potassium chloride 20 mEq oral tablet, extended release: 0.5 tab(s) orally once a day (07 Sep 2024 11:22)  temazepam 15 mg oral capsule: 1 cap(s) orally once a day (at bedtime) as needed for  insomnia (07 Sep 2024 11:24)    MEDICATIONS  (STANDING):  aspirin enteric coated 81 milliGRAM(s) Oral daily  buMETAnide Infusion 4 mG/Hr (20 mL/Hr) IV Continuous <Continuous>  DOBUTamine Infusion 10 MICROgram(s)/kG/Min (12.2 mL/Hr) IV Continuous <Continuous>    MEDICATIONS  (PRN):      REVIEW OF SYSTEMS:  General:   Respiratory: No cough, SOB  Cardiovascular: No CP or Palpitations	  Gastrointestinal: No nausea, Vomiting. No diarrhea  Genitourinary: No urinary complaints	  Musculoskeletal: No leg swelling, No new rash or lesions	  Neurological: 	  all other systems negative    T(F): 98.2 (09-08-24 @ 03:00), Max: 98.4 (09-07-24 @ 22:22)  HR: 69 (09-08-24 @ 06:14) (62 - 113)  BP: 106/64 (09-08-24 @ 06:00) (75/50 - 110/59)  RR: 20 (09-08-24 @ 06:00) (12 - 39)  SpO2: 100% (09-08-24 @ 06:14) (93% - 100%)  Wt(kg): --    PHYSICAL EXAM:  General: NAD  Respiratory: b/l air entry  Cardiovascular: S1 S2  Gastrointestinal: soft  Extremities: edema        09-08    123<L>  |  88<L>  |  107<H>  ----------------------------<  76  5.3   |  15<L>  |  4.74<H>    Ca    8.6      08 Sep 2024 04:47  Phos  7.4     09-08  Mg     2.6     09-08    TPro  6.6  /  Alb  2.8<L>  /  TBili  2.1<H>  /  DBili  x   /  AST  59<H>  /  ALT  27  /  AlkPhos  230<H>  09-08                          8.5    6.09  )-----------( 82       ( 08 Sep 2024 02:22 )             26.6       Potassium: 5.3 mmol/L (09-08 @ 04:47)  Calcium: 8.6 mg/dL (09-08 @ 04:47)  Blood Urea Nitrogen: 107 mg/dL (09-08 @ 04:47)  Blood Urea Nitrogen: 104 mg/dL (09-08 @ 02:22)      Creatinine, Serum: 4.74 (09-08 @ 04:47)  Creatinine, Serum: 4.68 (09-08 @ 02:22)  Creatinine, Serum: 4.68 (09-07 @ 22:57)  Creatinine, Serum: 4.59 (09-07 @ 15:43)  Creatinine, Serum: 4.60 (09-07 @ 10:45)      Urinalysis Basic - ( 08 Sep 2024 04:47 )    Color: x / Appearance: x / SG: x / pH: x  Gluc: 76 mg/dL / Ketone: x  / Bili: x / Urobili: x   Blood: x / Protein: x / Nitrite: x   Leuk Esterase: x / RBC: x / WBC x   Sq Epi: x / Non Sq Epi: x / Bacteria: x      LIVER FUNCTIONS - ( 08 Sep 2024 04:47 )  Alb: 2.8 g/dL / Pro: 6.6 g/dL / ALK PHOS: 230 U/L / ALT: 27 U/L / AST: 59 U/L / GGT: x                       I&O's Detail    07 Sep 2024 07:01  -  08 Sep 2024 07:00  --------------------------------------------------------  IN:    Bumetanide: 120 mL    DOBUTamine: 24 mL    DOBUTamine: 55 mL    IV PiggyBack: 100 mL  Total IN: 299 mL    OUT:    Indwelling Catheter - Urethral (mL): 405 mL  Total OUT: 405 mL    Total NET: -106 mL               Patient is a 79y old  Male who presents with a chief complaint of cardiogenic shock (08 Sep 2024 06:57)    HPI:  79-year-old male with a history of HFrEF on home dobutamine 7.5 with last EF less than 20%, CAD, lungs mass - SCC, and urinary retention presented transferred from [facility] for management of heart failure after having syncopal episode last night.  Patient also having reduced urine output despite 6 mg of Bumex twice a day.  At [facility] patient had a workup which was notable for sodium of 124, creatinine to 4.6 from his baseline of approximately 2-2.1, proBNP 5220, lactate 1.9.  He was transferred here for further evaluation and  heart failure management, admitted to CICU.    On admission to CICU he is A&O, saturating well on room air, sinus rhythm 75 /59. He denies dizziness, chest pain, sob, N&V on admission. (07 Sep 2024 23:06)    Renal consult called for BRODY. History obtained from chart. Pt on BIPAP.       PAST MEDICAL HISTORY:  BPH (benign prostatic hyperplasia)    Bipolar disorder    Depression    Anxiety    Umbilical hernia, incarcerated    Inguinal hernia of right side without obstruction or gangrene    Depression    Constipation    Urinary retention    CAD (coronary artery disease)    SCC (squamous cell carcinoma)    Lung mass    Other nonspecific abnormal finding of lung field    LV (left ventricular) mural thrombus    History of inguinal hernia    Severe left ventricular systolic dysfunction    History of CHF (congestive heart failure)    CHF, chronic    Heart failure with reduced ejection fraction        PAST SURGICAL HISTORY:  Anxiety    Depression    BPH (benign prostatic hyperplasia)    History of arthroscopy of knee    History of tonsillectomy    History of hernia repair    H/O coronary angiogram        FAMILY HISTORY:  Family history of depression (Mother)    FH: CAD (coronary artery disease) (Sibling, Sibling)    Family history of diabetes mellitus (DM) (Sibling)        SOCIAL HISTORY: No smoking or alcohol use     Allergies    No Known Allergies    Intolerances    atorvastatin (Muscle Pain (Severe))    Home Medications:  aspirin 81 mg oral delayed release tablet: 1 tab(s) orally once a day (07 Sep 2024 11:24)  clopidogrel 75 mg oral tablet: 1 tab(s) orally once a day (07 Sep 2024 11:24)  Colace 100 mg oral capsule: 1 cap(s) orally once a day (07 Sep 2024 11:24)  DOBUTamine: 7.9 milliliter(s) by continuous intravenous infusion once a day 1000mg in 250cc, infuse at 7.9ml/hr continuously daily, no stop time (07 Sep 2024 11:20)  LORazepam 0.5 mg oral tablet: 1 tab(s) orally once a day as needed for  anxiety (07 Sep 2024 11:24)  potassium chloride 20 mEq oral tablet, extended release: 0.5 tab(s) orally once a day (07 Sep 2024 11:22)  temazepam 15 mg oral capsule: 1 cap(s) orally once a day (at bedtime) as needed for  insomnia (07 Sep 2024 11:24)    MEDICATIONS  (STANDING):  aspirin enteric coated 81 milliGRAM(s) Oral daily  buMETAnide Infusion 4 mG/Hr (20 mL/Hr) IV Continuous <Continuous>  DOBUTamine Infusion 10 MICROgram(s)/kG/Min (12.2 mL/Hr) IV Continuous <Continuous>    MEDICATIONS  (PRN):      REVIEW OF SYSTEMS:  General: on BIPAP  Unable to obtain    T(F): 98.2 (09-08-24 @ 03:00), Max: 98.4 (09-07-24 @ 22:22)  HR: 69 (09-08-24 @ 06:14) (62 - 113)  BP: 106/64 (09-08-24 @ 06:00) (75/50 - 110/59)  RR: 20 (09-08-24 @ 06:00) (12 - 39)  SpO2: 100% (09-08-24 @ 06:14) (93% - 100%)  Wt(kg): --    PHYSICAL EXAM:  General: on BIPAP  Respiratory: b/l air entry  Cardiovascular: S1 S2  Gastrointestinal: soft  Extremities: no edema        09-08    123<L>  |  88<L>  |  107<H>  ----------------------------<  76  5.3   |  15<L>  |  4.74<H>    Ca    8.6      08 Sep 2024 04:47  Phos  7.4     09-08  Mg     2.6     09-08    TPro  6.6  /  Alb  2.8<L>  /  TBili  2.1<H>  /  DBili  x   /  AST  59<H>  /  ALT  27  /  AlkPhos  230<H>  09-08                          8.5    6.09  )-----------( 82       ( 08 Sep 2024 02:22 )             26.6       Potassium: 5.3 mmol/L (09-08 @ 04:47)  Calcium: 8.6 mg/dL (09-08 @ 04:47)  Blood Urea Nitrogen: 107 mg/dL (09-08 @ 04:47)  Blood Urea Nitrogen: 104 mg/dL (09-08 @ 02:22)      Creatinine, Serum: 4.74 (09-08 @ 04:47)  Creatinine, Serum: 4.68 (09-08 @ 02:22)  Creatinine, Serum: 4.68 (09-07 @ 22:57)  Creatinine, Serum: 4.59 (09-07 @ 15:43)  Creatinine, Serum: 4.60 (09-07 @ 10:45)      Urinalysis Basic - ( 08 Sep 2024 04:47 )    Color: x / Appearance: x / SG: x / pH: x  Gluc: 76 mg/dL / Ketone: x  / Bili: x / Urobili: x   Blood: x / Protein: x / Nitrite: x   Leuk Esterase: x / RBC: x / WBC x   Sq Epi: x / Non Sq Epi: x / Bacteria: x      LIVER FUNCTIONS - ( 08 Sep 2024 04:47 )  Alb: 2.8 g/dL / Pro: 6.6 g/dL / ALK PHOS: 230 U/L / ALT: 27 U/L / AST: 59 U/L / GGT: x                       I&O's Detail    07 Sep 2024 07:01  -  08 Sep 2024 07:00  --------------------------------------------------------  IN:    Bumetanide: 120 mL    DOBUTamine: 24 mL    DOBUTamine: 55 mL    IV PiggyBack: 100 mL  Total IN: 299 mL    OUT:    Indwelling Catheter - Urethral (mL): 405 mL  Total OUT: 405 mL    Total NET: -106 mL      < from: Xray Chest 1 View- PORTABLE-Urgent (09.07.24 @ 11:49) >    ACC: [id number] EXAM:  XR CHEST PORTABLE URGENT 1V   ORDERED BY:  DECLAN YOUNG     PROCEDURE DATE:  09/07/2024          INTERPRETATION:  AP chest on September 7, 2024 at 11:38 AM. Patient is   short of breath.    2 images.    Heart is enlarged. There is persistent irregular right base infiltrate   with adjacent atelectasis last infiltrate. There is persistent   atelectasis bandlike in nature in the left upper lung field. Left PICC   line remains.    Chest is similar to August 3.    IMPRESSION: Stable findings as above.    --- End of Report ---            SOL DOMÍNGUEZ MD; Attending Radiologist  This document has been electronically signed. Sep  7 2024  1:58PM    < end of copied text >            < from: US Renal (09.07.24 @ 18:44) >    ACC: [id number] EXAM:  US KIDNEY(S)   ORDERED BY:  SHALA OREILLY     PROCEDURE DATE:  09/07/2024          INTERPRETATION:  CLINICAL INFORMATION: Acute renal failure, evaluate   kidneys    COMPARISON: None available.    TECHNIQUE: Sonography of the kidneys and bladder.    FINDINGS:  Right kidney: 12.9 cm. Increase in echogenicity. Multiple renal cysts   largest of which measures up to 9.6 x 6.7 x 7.3 cm in the mid pole. Mild   right hydronephrosis.    Left kidney: 10.8 cm. No renal mass, hydronephrosis or calculi.    Urinary bladder: Thickened trabeculated wall with right-sided bladder   wall diverticulum.    Other: Trace ascites.    IMPRESSION:  *  Right renal cysts and bilateral renal parenchymal disease.  *  No hydronephrosis.  *  Thickened trabeculated bladder wall.  *  Trace ascites.        --- End of Report ---          MAULIK OWUSU DO; Resident Radiologist  This document has been electronically signed.  JOSE RAUL FOSTER MD; Attending Radiologist  This document has been electronically signed. Sep  7 2024  7:50PM    < end of copied text >

## 2024-09-08 NOTE — PROGRESS NOTE ADULT - SUBJECTIVE AND OBJECTIVE BOX
Patient is a 79y old  Male who presents with a chief complaint of cardiogenic shock (07 Sep 2024 23:06)          Allergies    No Known Allergies    Intolerances    atorvastatin (Muscle Pain (Severe))      Medications:  aspirin enteric coated 81 milliGRAM(s) Oral daily  buMETAnide Infusion 4 mG/Hr IV Continuous <Continuous>  DOBUTamine Infusion 10 MICROgram(s)/kG/Min IV Continuous <Continuous>      Vitals:  T(C): 36.8 (24 @ 03:00), Max: 36.9 (24 @ 22:22)  HR: 69 (24 @ 06:14) (62 - 113)  BP: 106/64 (24 @ 06:00) (75/50 - 110/59)  BP(mean): 79 (24 @ 06:00) (58 - 85)  RR: 20 (24 @ 06:00) (12 - 39)  SpO2: 100% (24 @ 06:14) (93% - 100%)  Wt(kg): --  Daily Height in cm: 182.88 (07 Sep 2024 22:22)    Daily   I&O's Summary    07 Sep 2024 07:01  -  08 Sep 2024 06:57  --------------------------------------------------------  IN: 299 mL / OUT: 405 mL / NET: -106 mL          Physical Exam:  Appearance: Normal  Eyes: PERRL, EOMI  HENT: Normal oral muscosa, NC/AT  Cardiovascular: S1S2, RRR, No M/R/G, no JVD, No Lower extremity edema  Procedural Access Site: No hematoma, Non-tender to palpation, 2+ pulse, No bruit, No Ecchymosis  Respiratory: Clear to auscultation bilaterally  Gastrointestinal: Soft, Non tender, Normal Bowel Sounds  Musculoskeletal: No clubbing, No joint deformity   Neurologic: Non-focal  Lymphatic: No lymphadenopathy  Psychiatry: AAOx3, Mood & affect appropriate  Skin: No rashes, No ecchymoses, No cyanosis        123<L>  |  88<L>  |  107<H>  ----------------------------<  76  5.3   |  15<L>  |  4.74<H>    Ca    8.6      08 Sep 2024 04:47  Phos  7.4       Mg     2.6         TPro  6.6  /  Alb  2.8<L>  /  TBili  2.1<H>  /  DBili  x   /  AST  59<H>  /  ALT  27  /  AlkPhos  230<H>      PT/INR - ( 07 Sep 2024 10:45 )   PT: 14.8 sec;   INR: 1.31 ratio         PTT - ( 07 Sep 2024 22:56 )  PTT:32.8 sec        Lipid panel Total Cholesterol: 99  LDL: --  HDL: 58  T      Hgb A1c         ECG:    Echo:    Stress Testing:     Cath:    Imaging:    Interpretation of Telemetry:   HPI:  79-year-old male with a history of HFrEF on home dobutamine 7.5 with last EF less than 20%, CAD, lungs mass - SCC, and urinary retention presented transferred from [facility] for management of heart failure after having syncopal episode last night.  Patient also having reduced urine output despite 6 mg of Bumex twice a day.  At [facility] patient had a workup which was notable for sodium of 124, creatinine to 4.6 from his baseline of approximately 2-2.1, proBNP 5220, lactate 1.9.  He was transferred here for further evaluation and  heart failure management, admitted to CICU.    On admission to CICU he is A&O, saturating well on room air, sinus rhythm 75 /59. He denies dizziness, chest pain, sob, N&V on admission. (07 Sep 2024 23:06)    Events:  Vitals:  T(C): 36.8 (24 @ 03:00), Max: 36.9 (24 @ 22:22)  HR: 69 (24 @ 06:14) (62 - 113)  BP: 106/64 (24 @ 06:00) (75/50 - 110/59)  BP(mean): 79 (24 @ 06:00) (58 - 85)  RR: 20 (24 @ 06:00) (12 - 39)  SpO2: 100% (24 @ 06:14) (93% - 100%)  Wt(kg): --  Daily Height in cm: 182.88 (07 Sep 2024 22:22)    Daily   I&O's Summary    07 Sep 2024 07:01  -  08 Sep 2024 06:57  --------------------------------------------------------  IN: 299 mL / OUT: 405 mL / NET: -106 mL          Physical Exam:  Appearance: Normal  Eyes: PERRL, EOMI  HENT: Normal oral muscosa, NC/AT  Cardiovascular: S1S2, RRR, No M/R/G, no JVD, No Lower extremity edema  Procedural Access Site: No hematoma, Non-tender to palpation, 2+ pulse, No bruit, No Ecchymosis  Respiratory: Clear to auscultation bilaterally  Gastrointestinal: Soft, Non tender, Normal Bowel Sounds  Musculoskeletal: No clubbing, No joint deformity   Neurologic: Non-focal  Lymphatic: No lymphadenopathy  Psychiatry: AAOx3, Mood & affect appropriate  Skin: No rashes, No ecchymoses, No cyanosis        123<L>  |  88<L>  |  107<H>  ----------------------------<  76  5.3   |  15<L>  |  4.74<H>    Ca    8.6      08 Sep 2024 04:47  Phos  7.4       Mg     2.6         TPro  6.6  /  Alb  2.8<L>  /  TBili  2.1<H>  /  DBili  x   /  AST  59<H>  /  ALT  27  /  AlkPhos  230<H>      PT/INR - ( 07 Sep 2024 10:45 )   PT: 14.8 sec;   INR: 1.31 ratio         PTT - ( 07 Sep 2024 22:56 )  PTT:32.8 sec        Lipid panel Total Cholesterol: 99  LDL: --  HDL: 58  T      Hgb A1c         ECG:    Echo:    Stress Testing:     Cath:    Imaging:    Interpretation of Telemetry:   HPI:  79-year-old male with a history of HFrEF on home dobutamine 7.5 with last EF less than 20%, CAD, lungs mass - SCC, and urinary retention presented transferred from [facility] for management of heart failure after having syncopal episode last night.  Patient also having reduced urine output despite 6 mg of Bumex twice a day.  At [facility] patient had a workup which was notable for sodium of 124, creatinine to 4.6 from his baseline of approximately 2-2.1, proBNP 5220, lactate 1.9.  He was transferred here for further evaluation and  heart failure management, admitted to CICU.    On admission to CICU he is A&O, saturating well on room air, sinus rhythm 75 /59. He denies dizziness, chest pain, sob, N&V on admission. (07 Sep 2024 23:06)    Events: Pt started making urine and now off BIPAP, LFV shiley placed for CRRT.    Vitals:  T(C): 36.8 (24 @ 03:00), Max: 36.9 (24 @ 22:22)  HR: 69 (24 @ 06:14) (62 - 113)  BP: 106/64 (24 @ 06:00) (75/50 - 110/59)  BP(mean): 79 (24 @ 06:00) (58 - 85)  RR: 20 (24 @ 06:00) (12 - 39)  SpO2: 100% (24 @ 06:14) (93% - 100%)    Daily Height in cm: 182.88 (07 Sep 2024 22:22)    Daily   I&O's Summary    07 Sep 2024 07:01  -  08 Sep 2024 06:57  --------------------------------------------------------  IN: 299 mL / OUT: 405 mL / NET: -106 mL    Physical Exam:  Appearance: frail appearing  Eyes: PERRL, EOMI  HENT: Normal oral muscosa, NC/AT  Cardiovascular: S1S2, RRR, No M/R/G, no JVD, No Lower extremity edema  Procedural Access Site: L groin access site c/D/I without bleeding induration or hematoma  Respiratory: Clear to auscultation bilaterally, diminished lower lobes  Gastrointestinal: Soft, Non tender, Normal Bowel Sounds  Musculoskeletal: No clubbing, No joint deformity   Neurologic: Non-focal  Lymphatic: No lymphadenopathy  Psychiatry: AAOx3, Mood & affect appropriate  Skin: No rashes, No ecchymoses, No cyanosis        123<L>  |  88<L>  |  107<H>  ----------------------------<  76  5.3   |  15<L>  |  4.74<H>    Ca    8.6      08 Sep 2024 04:47  Phos  7.4       Mg     2.6         TPro  6.6  /  Alb  2.8<L>  /  TBili  2.1<H>  /  DBili  x   /  AST  59<H>  /  ALT  27  /  AlkPhos  230<H>      PT/INR - ( 07 Sep 2024 10:45 )   PT: 14.8 sec;   INR: 1.31 ratio      PTT - ( 07 Sep 2024 22:56 )  PTT:32.8 sec    Lipid panel Total Cholesterol: 99  HDL: 58  T    ECG:Diagnosis Line Sinus rhythm with 1st degree AV block with premature ventricular complexes or fusion complexes  Low voltage QRS  Left axis deviation  Right bundle branch block    Echo: 1. Biatrial enlargement   2. Left ventricular ejection fraction, by visual estimation, is <20%.   3. Severely decreased global left ventricular systolic function.   4. Entire septum, entire apex, and mid anterior segment are abnormal as   described above.   5. Increased LV wall thickness.   6. Mildly increased left ventricular internal cavity size.   7. Right ventricular volume and pressure overload.   8. There is mild concentric left ventricular hypertrophy.   9. Severely enlarged right ventricle.  10. Severely reduced RV systolic function.  11. Moderate mitral valve regurgitation.  12. Moderate-severe tricuspid regurgitation.  13. Mild aortic regurgitation.  14. Mild pulmonic valve regurgitation.  15. Estimated pulmonary artery systolic pressure is 44.6 mmHg assuming a   right atrial pressure of 15 mmHg, which is consistent with mild pulmonary   hypertension.  16. LA volume Index is 38.5 ml/m² ml/m2.    Cath:* The left ventricle was normal in size. The mid to distal  anterior, mid to distal anteroseptal, apical, and inferior  walls do not demonstrate significant viability.  The  septum demonstrates mild viability. Theremaining segments  are viable (predominantly the lateral wall only).  * Rest gated wall motion analysis was performed (LVEF = 29  %;LVEDV = 205 ml.), revealing markedly reduced  LV  function with regional variation.  Conclusion:  The left ventricle was normal in size. The mid to distal  anterior, mid to distal anteroseptal, apical, and inferior  walls do not demonstrate significant viability.  The  septum demonstrates mild viability. The remaining segments  are viable (predominantly the lateral wall only).    IMPRESSION:  Right IJ with tip overlying right brachiocephalic, SVC junction.    Interpretation of Telemetry: SR 80   HPI:  79-year-old male with a history of HFrEF on home dobutamine 7.5 with last EF less than 20%, CAD, lungs mass - SCC, and urinary retention presented transferred from [facility] for management of heart failure after having syncopal episode last night.  Patient also having reduced urine output despite 6 mg of Bumex twice a day.  At [facility] patient had a workup which was notable for sodium of 124, creatinine to 4.6 from his baseline of approximately 2-2.1, proBNP 5220, lactate 1.9.  He was transferred here for further evaluation and  heart failure management, admitted to CICU.    On admission to CICU, saturating well on room air, sinus rhythm 75 /59. He denies dizziness, chest pain, sob, N&V on admission. (07 Sep 2024 23:06)    Events: Pt started making urine and now off BIPAP, LFV shiley placed for CRRT.    Vitals:  T(C): 36.8 (24 @ 03:00), Max: 36.9 (24 @ 22:22)  HR: 69 (24 @ 06:14) (62 - 113)  BP: 106/64 (24 @ 06:00) (75/50 - 110/59)  BP(mean): 79 (24 @ 06:00) (58 - 85)  RR: 20 (24 @ 06:00) (12 - 39)  SpO2: 100% (24 @ 06:14) (93% - 100%)    Daily Height in cm: 182.88 (07 Sep 2024 22:22)    Daily   I&O's Summary    07 Sep 2024 07:01  -  08 Sep 2024 06:57  --------------------------------------------------------  IN: 299 mL / OUT: 405 mL / NET: -106 mL    Physical Exam:  Appearance: frail appearing  Eyes: PERRL, EOMI  Cardiovascular: S1S2, RRR  Respiratory: Clear to auscultation bilaterally, diminished lower lobes  Gastrointestinal: Soft, Non tender, Normal Bowel Sounds  Neurologic: Non-focal  Psychiatry: AAOx3, Mood & affect appropriate        123<L>  |  88<L>  |  107<H>  ----------------------------<  76  5.3   |  15<L>  |  4.74<H>    Ca    8.6      08 Sep 2024 04:47  Phos  7.4       Mg     2.6         TPro  6.6  /  Alb  2.8<L>  /  TBili  2.1<H>  /  DBili  x   /  AST  59<H>  /  ALT  27  /  AlkPhos  230<H>      PT/INR - ( 07 Sep 2024 10:45 )   PT: 14.8 sec;   INR: 1.31 ratio      PTT - ( 07 Sep 2024 22:56 )  PTT:32.8 sec    Lipid panel Total Cholesterol: 99  HDL: 58  T    ECG:Diagnosis Line Sinus rhythm with 1st degree AV block with premature ventricular complexes or fusion complexes  Low voltage QRS  Left axis deviation  Right bundle branch block    Echo: 1. Biatrial enlargement   2. Left ventricular ejection fraction, by visual estimation, is <20%.   3. Severely decreased global left ventricular systolic function.   4. Entire septum, entire apex, and mid anterior segment are abnormal as   described above.   5. Increased LV wall thickness.   6. Mildly increased left ventricular internal cavity size.   7. Right ventricular volume and pressure overload.   8. There is mild concentric left ventricular hypertrophy.   9. Severely enlarged right ventricle.  10. Severely reduced RV systolic function.  11. Moderate mitral valve regurgitation.  12. Moderate-severe tricuspid regurgitation.  13. Mild aortic regurgitation.  14. Mild pulmonic valve regurgitation.  15. Estimated pulmonary artery systolic pressure is 44.6 mmHg assuming a   right atrial pressure of 15 mmHg, which is consistent with mild pulmonary   hypertension.  16. LA volume Index is 38.5 ml/m² ml/m2.    Cath:* The left ventricle was normal in size. The mid to distal  anterior, mid to distal anteroseptal, apical, and inferior  walls do not demonstrate significant viability.  The  septum demonstrates mild viability. Theremaining segments  are viable (predominantly the lateral wall only).  * Rest gated wall motion analysis was performed (LVEF = 29  %;LVEDV = 205 ml.), revealing markedly reduced  LV  function with regional variation.  Conclusion:  The left ventricle was normal in size. The mid to distal  anterior, mid to distal anteroseptal, apical, and inferior  walls do not demonstrate significant viability.  The  septum demonstrates mild viability. The remaining segments  are viable (predominantly the lateral wall only).    IMPRESSION:  Right IJ with tip overlying right brachiocephalic, SVC junction.    Interpretation of Telemetry: SR 80

## 2024-09-08 NOTE — PROCEDURE NOTE - NSPOSTPRCRAD_GEN_A_CORE
central line located in the superior vena cava/line adjusted to depth of insertion/no pneumothorax/post-procedure radiography performed
US confirmed/central line located in the

## 2024-09-08 NOTE — PROGRESS NOTE ADULT - SUBJECTIVE AND OBJECTIVE BOX
Patient is a 79y old  Male who presents with a chief complaint of cardiogenic shock (08 Sep 2024 07:33)      INTERVAL HISTORY:  -    SUBJECTIVE  - Patient seen and evaluated at bedside.     MEDICATIONS:  MEDICATIONS  (STANDING):  aspirin enteric coated 81 milliGRAM(s) Oral daily  cefepime   IVPB      cefepime   IVPB 1000 milliGRAM(s) IV Intermittent every 12 hours  chlorhexidine 2% Cloths 1 Application(s) Topical daily  CRRT Treatment    <Continuous>  DOBUTamine Infusion 10 MICROgram(s)/kG/Min (12.2 mL/Hr) IV Continuous <Continuous>  PrismaSATE Dialysate BGK 4 / 2.5 5000 milliLiter(s) (1000 mL/Hr) CRRT <Continuous>  PrismaSOL Filtration BGK 4 / 2.5 5000 milliLiter(s) (600 mL/Hr) CRRT <Continuous>  PrismaSOL Filtration BGK 4 / 2.5 5000 milliLiter(s) (800 mL/Hr) CRRT <Continuous>    MEDICATIONS  (PRN):      OBJECTIVE:  ICU Vital Signs Last 24 Hrs  T(C): 36.6 (08 Sep 2024 19:00), Max: 36.9 (07 Sep 2024 22:22)  T(F): 97.9 (08 Sep 2024 19:00), Max: 98.4 (07 Sep 2024 22:22)  HR: 79 (08 Sep 2024 19:00) (67 - 113)  BP: 90/52 (08 Sep 2024 19:00) (75/50 - 111/64)  BP(mean): 66 (08 Sep 2024 19:00) (58 - 85)  ABP: --  ABP(mean): --  RR: 23 (08 Sep 2024 19:00) (12 - 39)  SpO2: 95% (08 Sep 2024 19:00) (93% - 100%)    O2 Parameters below as of 08 Sep 2024 19:00  Patient On (Oxygen Delivery Method): nasal cannula  O2 Flow (L/min): 2          Adult Advanced Hemodynamics Last 24 Hrs  CVP(mm Hg): --  CVP(cm H2O): --  CO: --  CI: --  PA: --  PA(mean): --  PCWP: --  SVR: --  SVRI: --  PVR: --  PVRI: --  CAPILLARY BLOOD GLUCOSE      POCT Blood Glucose.: 108 mg/dL (08 Sep 2024 13:12)  POCT Blood Glucose.: 109 mg/dL (08 Sep 2024 03:24)  POCT Blood Glucose.: 81 mg/dL (08 Sep 2024 02:22)  POCT Blood Glucose.: 131 mg/dL (08 Sep 2024 00:07)  POCT Blood Glucose.: 75 mg/dL (07 Sep 2024 23:53)    CAPILLARY BLOOD GLUCOSE      POCT Blood Glucose.: 108 mg/dL (08 Sep 2024 13:12)    I&O's Summary    07 Sep 2024 07:01  -  08 Sep 2024 07:00  --------------------------------------------------------  IN: 361 mL / OUT: 405 mL / NET: -44 mL    08 Sep 2024 07:01  -  08 Sep 2024 19:57  --------------------------------------------------------  IN: 693 mL / OUT: 2716 mL / NET: -2023 mL      Daily Height in cm: 182.88 (07 Sep 2024 22:22)    Daily     PHYSICAL EXAM:  General: NAD, well-groomed, well-developed  Eyes: Conjunctiva and sclera clear  ENMT: Moist mucous membranes  Neck: Supple  Chest: Clear to auscultation bilaterally; no rales, rhonchi, or wheezing  Heart: Regular rate and rhythm; normal S1 and S2  Abd: Soft, nontender, nondistended  Nervous System: AAOX3  Ext: no peripheral LE edema bilaterally    LABS:                          8.5    6.09  )-----------( 82       ( 08 Sep 2024 02:22 )             26.6     09-08    127<L>  |  94<L>  |  88<H>  ----------------------------<  125<H>  3.8   |  17<L>  |  3.62<H>    Ca    7.8<L>      08 Sep 2024 14:23  Phos  4.9     09-08  Mg     2.3     09-08    TPro  5.8<L>  /  Alb  2.6<L>  /  TBili  2.0<H>  /  DBili  x   /  AST  55<H>  /  ALT  25  /  AlkPhos  211<H>  09-08    LIVER FUNCTIONS - ( 08 Sep 2024 14:23 )  Alb: 2.6 g/dL / Pro: 5.8 g/dL / ALK PHOS: 211 U/L / ALT: 25 U/L / AST: 55 U/L / GGT: x           PT/INR - ( 07 Sep 2024 10:45 )   PT: 14.8 sec;   INR: 1.31 ratio         PTT - ( 07 Sep 2024 22:56 )  PTT:32.8 sec        Urinalysis Basic - ( 08 Sep 2024 14:23 )    Color: x / Appearance: x / SG: x / pH: x  Gluc: 125 mg/dL / Ketone: x  / Bili: x / Urobili: x   Blood: x / Protein: x / Nitrite: x   Leuk Esterase: x / RBC: x / WBC x   Sq Epi: x / Non Sq Epi: x / Bacteria: x          Plan:  NEURO  - continue to monitor mental status as per protocol     RESPIRATORY  - continue to monitor SpO2 with goal >94%    Mechanical Vent:     CARDIO      RENAL/  - Continue monitoring urine output, lytes, SCr/ BUN  - replete lytes prn with goal K >4 and Mg >2    GI      ENDO      HEME  - Monitor H/H and plts  - DVT PPX:     ID  - monitor and trend WBC and temperature curve     Dispo: Maintain in ICU.    Patient requires continuous monitoring with bedside rhythm monitoring, arterial line, pulse oximetry, ventilator monitoring and intermittent blood gas analysis.  Care plan discussed with ICU care team.  I have spent 35 minutes providing critical care, in addition to initial critical time provided by CICU attending Dr. Wheeler, re-evaluated multiple times during the day.    Odalys Cabrera PA-C Patient is a 79y old  Male who presents with a chief complaint of cardiogenic shock (08 Sep 2024 07:33)    INTERVAL HISTORY:  - CRRT d/c'd    SUBJECTIVE  - Patient seen and evaluated at bedside.     MEDICATIONS:  MEDICATIONS  (STANDING):  aspirin enteric coated 81 milliGRAM(s) Oral daily  cefepime   IVPB      cefepime   IVPB 1000 milliGRAM(s) IV Intermittent every 12 hours  chlorhexidine 2% Cloths 1 Application(s) Topical daily  CRRT Treatment    <Continuous>  DOBUTamine Infusion 10 MICROgram(s)/kG/Min (12.2 mL/Hr) IV Continuous <Continuous>  PrismaSATE Dialysate BGK 4 / 2.5 5000 milliLiter(s) (1000 mL/Hr) CRRT <Continuous>  PrismaSOL Filtration BGK 4 / 2.5 5000 milliLiter(s) (600 mL/Hr) CRRT <Continuous>  PrismaSOL Filtration BGK 4 / 2.5 5000 milliLiter(s) (800 mL/Hr) CRRT <Continuous>    OBJECTIVE:  ICU Vital Signs Last 24 Hrs  T(C): 36.6 (08 Sep 2024 19:00), Max: 36.9 (07 Sep 2024 22:22)  T(F): 97.9 (08 Sep 2024 19:00), Max: 98.4 (07 Sep 2024 22:22)  HR: 79 (08 Sep 2024 19:00) (67 - 113)  BP: 90/52 (08 Sep 2024 19:00) (75/50 - 111/64)  BP(mean): 66 (08 Sep 2024 19:00) (58 - 85)  RR: 23 (08 Sep 2024 19:00) (12 - 39)  SpO2: 95% (08 Sep 2024 19:00) (93% - 100%)    O2 Parameters below as of 08 Sep 2024 19:00  Patient On (Oxygen Delivery Method): nasal cannula  O2 Flow (L/min): 2    CAPILLARY BLOOD GLUCOSE  POCT Blood Glucose.: 108 mg/dL (08 Sep 2024 13:12)  POCT Blood Glucose.: 109 mg/dL (08 Sep 2024 03:24)  POCT Blood Glucose.: 81 mg/dL (08 Sep 2024 02:22)  POCT Blood Glucose.: 131 mg/dL (08 Sep 2024 00:07)  POCT Blood Glucose.: 75 mg/dL (07 Sep 2024 23:53)    CAPILLARY BLOOD GLUCOSE  POCT Blood Glucose.: 108 mg/dL (08 Sep 2024 13:12)    I&O's Summary  07 Sep 2024 07:01  -  08 Sep 2024 07:00  --------------------------------------------------------  IN: 361 mL / OUT: 405 mL / NET: -44 mL    08 Sep 2024 07:01  -  08 Sep 2024 19:57  --------------------------------------------------------  IN: 693 mL / OUT: 2716 mL / NET: -2023 mL    Daily Height in cm: 182.88 (07 Sep 2024 22:22)      LABS:                   8.5    6.09  )-----------( 82       ( 08 Sep 2024 02:22 )             26.6     09-08    127<L>  |  94<L>  |  88<H>  ----------------------------<  125<H>  3.8   |  17<L>  |  3.62<H>    Ca    7.8<L>      08 Sep 2024 14:23  Phos  4.9     09-08  Mg     2.3     09-08    TPro  5.8<L>  /  Alb  2.6<L>  /  TBili  2.0<H>  /  DBili  x   /  AST  55<H>  /  ALT  25  /  AlkPhos  211<H>  09-08    LIVER FUNCTIONS - ( 08 Sep 2024 14:23 )  Alb: 2.6 g/dL / Pro: 5.8 g/dL / ALK PHOS: 211 U/L / ALT: 25 U/L / AST: 55 U/L / GGT: x           PT/INR - ( 07 Sep 2024 10:45 )   PT: 14.8 sec;   INR: 1.31 ratio    PTT - ( 07 Sep 2024 22:56 )  PTT:32.8 sec    Urinalysis Basic - ( 08 Sep 2024 14:23 )    Color: x / Appearance: x / SG: x / pH: x  Gluc: 125 mg/dL / Ketone: x  / Bili: x / Urobili: x   Blood: x / Protein: x / Nitrite: x   Leuk Esterase: x / RBC: x / WBC x   Sq Epi: x / Non Sq Epi: x / Bacteria: x    Assessment: 79M PMHx HFrEF on home dobutamine 7.5 with last EF less than 20%, CAD, lungs mass - SCC, and urinary retention presented transferred from [facility] for management failure team after having syncopal episode last night, admitted for cardiogenic shock.    Plan:  NEURO  - continue to monitor mental status as per protocol     RESPIRATORY  - continue to monitor SpO2 with goal >94%    Mechanical Vent:     CARDIO      RENAL/  - Continue monitoring urine output, lytes, SCr/ BUN  - replete lytes prn with goal K >4 and Mg >2    GI      ENDO      HEME  - Monitor H/H and plts  - DVT PPX:     ID  - monitor and trend WBC and temperature curve     Dispo: Maintain in ICU.    Patient requires continuous monitoring with bedside rhythm monitoring, arterial line, pulse oximetry, ventilator monitoring and intermittent blood gas analysis.  Care plan discussed with ICU care team.  I have spent 35 minutes providing critical care, in addition to initial critical time provided by CICU attending Dr. Wheeler, re-evaluated multiple times during the day.    Odalys Cabrera PA-C Patient is a 79y old  Male who presents with a chief complaint of cardiogenic shock (08 Sep 2024 07:33)    INTERVAL HISTORY:  - CRRT d/c'd    SUBJECTIVE  - Patient seen and evaluated at bedside.     MEDICATIONS:  MEDICATIONS  (STANDING):  aspirin enteric coated 81 milliGRAM(s) Oral daily  cefepime   IVPB      cefepime   IVPB 1000 milliGRAM(s) IV Intermittent every 12 hours  chlorhexidine 2% Cloths 1 Application(s) Topical daily  CRRT Treatment    <Continuous>  DOBUTamine Infusion 10 MICROgram(s)/kG/Min (12.2 mL/Hr) IV Continuous <Continuous>  PrismaSATE Dialysate BGK 4 / 2.5 5000 milliLiter(s) (1000 mL/Hr) CRRT <Continuous>  PrismaSOL Filtration BGK 4 / 2.5 5000 milliLiter(s) (600 mL/Hr) CRRT <Continuous>  PrismaSOL Filtration BGK 4 / 2.5 5000 milliLiter(s) (800 mL/Hr) CRRT <Continuous>    OBJECTIVE:  ICU Vital Signs Last 24 Hrs  T(C): 36.6 (08 Sep 2024 19:00), Max: 36.9 (07 Sep 2024 22:22)  T(F): 97.9 (08 Sep 2024 19:00), Max: 98.4 (07 Sep 2024 22:22)  HR: 79 (08 Sep 2024 19:00) (67 - 113)  BP: 90/52 (08 Sep 2024 19:00) (75/50 - 111/64)  BP(mean): 66 (08 Sep 2024 19:00) (58 - 85)  RR: 23 (08 Sep 2024 19:00) (12 - 39)  SpO2: 95% (08 Sep 2024 19:00) (93% - 100%)    O2 Parameters below as of 08 Sep 2024 19:00  Patient On (Oxygen Delivery Method): nasal cannula  O2 Flow (L/min): 2    CAPILLARY BLOOD GLUCOSE  POCT Blood Glucose.: 108 mg/dL (08 Sep 2024 13:12)  POCT Blood Glucose.: 109 mg/dL (08 Sep 2024 03:24)  POCT Blood Glucose.: 81 mg/dL (08 Sep 2024 02:22)  POCT Blood Glucose.: 131 mg/dL (08 Sep 2024 00:07)  POCT Blood Glucose.: 75 mg/dL (07 Sep 2024 23:53)    CAPILLARY BLOOD GLUCOSE  POCT Blood Glucose.: 108 mg/dL (08 Sep 2024 13:12)    I&O's Summary  07 Sep 2024 07:01  -  08 Sep 2024 07:00  --------------------------------------------------------  IN: 361 mL / OUT: 405 mL / NET: -44 mL    08 Sep 2024 07:01  -  08 Sep 2024 19:57  --------------------------------------------------------  IN: 693 mL / OUT: 2716 mL / NET: -2023 mL    Daily Height in cm: 182.88 (07 Sep 2024 22:22)      LABS:                   8.5    6.09  )-----------( 82       ( 08 Sep 2024 02:22 )             26.6     09-08    127<L>  |  94<L>  |  88<H>  ----------------------------<  125<H>  3.8   |  17<L>  |  3.62<H>    Ca    7.8<L>      08 Sep 2024 14:23  Phos  4.9       Mg     2.3         TPro  5.8<L>  /  Alb  2.6<L>  /  TBili  2.0<H>  /  DBili  x   /  AST  55<H>  /  ALT  25  /  AlkPhos  211<H>  0908    LIVER FUNCTIONS - ( 08 Sep 2024 14:23 )  Alb: 2.6 g/dL / Pro: 5.8 g/dL / ALK PHOS: 211 U/L / ALT: 25 U/L / AST: 55 U/L / GGT: x           PT/INR - ( 07 Sep 2024 10:45 )   PT: 14.8 sec;   INR: 1.31 ratio    PTT - ( 07 Sep 2024 22:56 )  PTT:32.8 sec    Urinalysis Basic - ( 08 Sep 2024 14:23 )    Color: x / Appearance: x / SG: x / pH: x  Gluc: 125 mg/dL / Ketone: x  / Bili: x / Urobili: x   Blood: x / Protein: x / Nitrite: x   Leuk Esterase: x / RBC: x / WBC x   Sq Epi: x / Non Sq Epi: x / Bacteria: x    Assessment: 79M PMHx HFrEF on home dobutamine 7.5 with last EF less than 20%, CAD, lungs mass - SCC, and urinary retention presented transferred from [facility] for management failure team after having syncopal episode last night, admitted for cardiogenic shock.    Plan:  NEURO  A&Ox3  - CT head post syncopal episode with no acute injury  - continue to monitor mental status as per protocol     RESPIRATORY  #Acute hypoxic respiratory failure  - currently on 2L NC, wean as tolerated  - Hx lung mass  - continue to monitor SpO2 with goal >94%    CARDIO  #Cardiogenic shock  - inotropic support:  10 (home dose 7.5)  - started on CRRT for preload reduction and severe metabolic derangements, circuit clotted x2, d/c'd ~8PM, urinating >200cc/hr    RENAL/  #BRODY on CKD  - baseline Cr ~2  - Continue monitoring urine output, lytes, SCr/ BUN  - replete lytes prn with goal K >4 and Mg >2    GI  Diet as tolerated    ENDO  A1c 5.8    HEME  - Monitor H/H and plts  - DVT PPX: HSQ    ID  Afebrile  - Hypothermic on admission  - started on Vanc & Cefepime for empiric coverage  - home PICC line d/c'd  - monitor and trend WBC and temperature curve     Dispo: Maintain in ICU.    Patient requires continuous monitoring with bedside rhythm monitoring, arterial line, pulse oximetry, ventilator monitoring and intermittent blood gas analysis.  Care plan discussed with ICU care team.  I have spent 35 minutes providing critical care, in addition to initial critical time provided by CICU attending Dr. Wheeler, re-evaluated multiple times during the day.    Odalys Cabrera PA-C

## 2024-09-08 NOTE — CONSULT NOTE ADULT - ASSESSMENT
BRODY on CKD, CRS  Dyspnea: CHF  CM, EF < 20%  h/o Lung ca    Pt seen in CICU on BIPAP and  support. UO seems to be slowly improving. Pt will benefit from RRT. Will initiate CRRT as BP is labile.   Attempted to call pt's wife x 4. Left message to call back. Monitor UO. Continue bumex drip for now. Cardiology follow up.     Further recommendations pending clinical course. Thank you for the courtesy of this referral.      BRODY on CKD, CRS  Dyspnea: CHF  CM, EF < 20%  h/o Lung ca    Pt seen in CICU on BIPAP and  support. UO seems to be slowly improving. Pt will benefit from RRT. Will initiate CRRT as BP is labile.   Attempted to call pt's wife x 4. Left message to call back. Monitor UO. Continue bumex drip for now. Cardiology follow up.     Further recommendations pending clinical course. Thank you for the courtesy of this referral.     Addendum: 8:15 am  Able to reach pt's wife. D/w pt's wife in detail regarding worsening renal function and need for initiation of renal replacement therapy.   Risks and benefits explained in detail. Pt's in agreement to start CRRT/hemodialysis. Consent obtained. D/w CICU NP.

## 2024-09-09 RX ORDER — LORAZEPAM 4 MG/ML
1 INJECTION INTRAMUSCULAR; INTRAVENOUS
Refills: 0 | DISCHARGE

## 2024-09-09 RX ORDER — DOCUSATE CALCIUM 240 MG/1
1 CAPSULE ORAL
Refills: 0 | DISCHARGE

## 2024-09-09 NOTE — PROGRESS NOTE ADULT - SUBJECTIVE AND OBJECTIVE BOX
ELYSE MALAVE  MRN-[id number]  Patient is a 79y old  Male who presents with a chief complaint of cardiogenic shock (09 Sep 2024 20:27)    HPI:  79-year-old male with a history of HFrEF on home dobutamine 7.5 with last EF less than 20%, CAD, lungs mass - SCC, and urinary retention presented transferred from [facility] for management of heart failure after having syncopal episode last night.  Patient also having reduced urine output despite 6 mg of Bumex twice a day.  At [facility] patient had a workup which was notable for sodium of 124, creatinine to 4.6 from his baseline of approximately 2-2.1, proBNP 5220, lactate 1.9.  He was transferred here for further evaluation and  heart failure management, admitted to CICU.    On admission to CICU he is A&O, saturating well on room air, sinus rhythm 75 /59. He denies dizziness, chest pain, sob, N&V on admission. (07 Sep 2024 23:06)      Hospital Course:    24 HOUR EVENTS:    REVIEW OF SYSTEMS:    CONSTITUTIONAL: No weakness, fevers or chills  EYES/ENT: No visual changes;  No vertigo or throat pain   NECK: No pain or stiffness  RESPIRATORY: No cough, wheezing, hemoptysis; No shortness of breath  CARDIOVASCULAR: No chest pain or palpitations  GASTROINTESTINAL: No abdominal or epigastric pain. No nausea, vomiting, or hematemesis; No diarrhea or constipation. No melena or hematochezia.  GENITOURINARY: No dysuria, frequency or hematuria  NEUROLOGICAL: No numbness or weakness  SKIN: No itching, rashes      ICU Vital Signs Last 24 Hrs  T(C): 36.2 (09 Sep 2024 19:00), Max: 36.5 (09 Sep 2024 03:00)  T(F): 97.2 (09 Sep 2024 19:00), Max: 97.7 (09 Sep 2024 03:00)  HR: 81 (09 Sep 2024 19:00) (78 - 89)  BP: 99/54 (09 Sep 2024 19:00) (81/53 - 116/56)  BP(mean): 68 (09 Sep 2024 19:00) (62 - 81)  ABP: --  ABP(mean): --  RR: 22 (09 Sep 2024 19:00) (14 - 25)  SpO2: 95% (09 Sep 2024 19:00) (92% - 99%)    O2 Parameters below as of 09 Sep 2024 19:00  Patient On (Oxygen Delivery Method): room air            CVP(mm Hg): 23 (09-09-24 @ 19:00) (10 - 23)  CO: --  CI: --  PA: --  PA(mean): --  PA(direct): --  PCWP: --  LA: --  RA: --  SVR: --  SVRI: --  PVR: --  PVRI: --  I&O's Summary    08 Sep 2024 07:01  -  09 Sep 2024 07:00  --------------------------------------------------------  IN: 1006 mL / OUT: 5016 mL / NET: -4010 mL    09 Sep 2024 07:01  -  09 Sep 2024 21:01  --------------------------------------------------------  IN: 859.5 mL / OUT: 1900 mL / NET: -1040.5 mL        CAPILLARY BLOOD GLUCOSE    CAPILLARY BLOOD GLUCOSE      POCT Blood Glucose.: 108 mg/dL (08 Sep 2024 13:12)      PHYSICAL EXAM:  GENERAL: No acute distress, well-developed  HEAD:  Atraumatic, Normocephalic  EYES: EOMI, PERRLA, conjunctiva and sclera clear  NECK: Supple, no lymphadenopathy, no JVD  CHEST/LUNG: CTAB; No wheezes, rales, or rhonchi  HEART: Regular rate and rhythm. Normal S1/S2. No murmurs, rubs, or gallops  ABDOMEN: Soft, non-tender, non-distended; normal bowel sounds, no organomegaly  EXTREMITIES:  2+ peripheral pulses b/l, No clubbing, cyanosis, or edema  NEUROLOGY: A&O x 3, no focal deficits  SKIN: No rashes or lesions    ============================I/O===========================   I&O's Detail    08 Sep 2024 07:01  -  09 Sep 2024 07:00  --------------------------------------------------------  IN:    Bumetanide: 40 mL    DOBUTamine: 276 mL    IV PiggyBack: 450 mL    Oral Fluid: 240 mL  Total IN: 1006 mL    OUT:    Indwelling Catheter - Urethral (mL): 4845 mL    Other (mL): 171 mL  Total OUT: 5016 mL    Total NET: -4010 mL      09 Sep 2024 07:01  -  09 Sep 2024 21:01  --------------------------------------------------------  IN:    DOBUTamine: 149.5 mL    IV PiggyBack: 110 mL    Oral Fluid: 600 mL  Total IN: 859.5 mL    OUT:    Indwelling Catheter - Urethral (mL): 1900 mL  Total OUT: 1900 mL    Total NET: -1040.5 mL        ============================ LABS =========================                        8.0    5.92  )-----------( 68       ( 09 Sep 2024 00:27 )             24.5     09-09    130<L>  |  94<L>  |  79<H>  ----------------------------<  104<H>  3.9   |  21<L>  |  3.42<H>    Ca    8.1<L>      09 Sep 2024 00:28  Phos  4.7     09-09  Mg     2.5     09-09    TPro  6.2  /  Alb  2.8<L>  /  TBili  2.2<H>  /  DBili  x   /  AST  63<H>  /  ALT  27  /  AlkPhos  235<H>  09-09    Troponin T, High Sensitivity Result: 152 ng/L (09-07-24 @ 16:38)              LIVER FUNCTIONS - ( 09 Sep 2024 00:28 )  Alb: 2.8 g/dL / Pro: 6.2 g/dL / ALK PHOS: 235 U/L / ALT: 27 U/L / AST: 63 U/L / GGT: x           PTT - ( 07 Sep 2024 22:56 )  PTT:32.8 sec    Blood Gas Venous - Lactate: 1.2 mmol/L (09-09-24 @ 00:00)  Blood Gas Venous - Lactate: 0.9 mmol/L (09-08-24 @ 15:05)  Blood Gas Venous - Lactate: 1.2 mmol/L (09-08-24 @ 08:19)  Blood Gas Venous - Lactate: 2.1 mmol/L (09-08-24 @ 04:15)  Blood Gas Venous - Lactate: 2.3 mmol/L (09-08-24 @ 02:13)  Blood Gas Venous - Lactate: 1.4 mmol/L (09-07-24 @ 23:34)  Lactate, Blood: 1.7 mmol/L (09-07-24 @ 20:07)  Lactate, Blood: 2.0 mmol/L (09-07-24 @ 16:38)  Lactate, Blood: 1.9 mmol/L (09-07-24 @ 10:45)    Urinalysis Basic - ( 09 Sep 2024 00:28 )    Color: x / Appearance: x / SG: x / pH: x  Gluc: 104 mg/dL / Ketone: x  / Bili: x / Urobili: x   Blood: x / Protein: x / Nitrite: x   Leuk Esterase: x / RBC: x / WBC x   Sq Epi: x / Non Sq Epi: x / Bacteria: x    ======================Micro/Rad/Cardio=================  Telemtry: Reviewed   EKG: Reviewed  CXR: Reviewed  Culture: Reviewed   Echo: Transthoracic Echocardiogram:   Patient name: ELYSE MALAVE  YOB: 1945   Age: 77 (M)   MR#: [id number]  Study Date: 1/4/2023  [facility] Darshana Rosenberg RDCS  Study quality: Technically good  Referring Physician: Kindra Kinney MD  Blood Pressure: 183/67 mmHg  Height: 178 cm  Weight: 79 kg  BSA: 2 m2  Heart Rate: 72 mmHg    ======================================================  PAST MEDICAL & SURGICAL HISTORY:  BPH (benign prostatic hyperplasia)      Bipolar disorder      Depression      Anxiety      Umbilical hernia, incarcerated      Constipation      Urinary retention      CAD (coronary artery disease)      SCC (squamous cell carcinoma)      Lung mass      CHF, chronic      Heart failure with reduced ejection fraction      History of arthroscopy of knee  Right, 1987      History of tonsillectomy  1952      History of hernia repair      H/O coronary angiogram        ====================ASSESSMENT ==============  79-year-old male with a history of HFrEF on home dobutamine 7.5 with last EF less than 20%, CAD, lungs mass - SCC, and urinary retention presented transferred from [facility] hospital for management failure team after having syncopal episode last night, admitted for cardiogenic shock.    Plan:  ====================== NEUROLOGY=====================  melatonin 5 milliGRAM(s) Oral at bedtime    - A&0 x3  - CT head post syncopal episode with no acute injury  - continue to monitor mental status per protocol  ==================== RESPIRATORY======================  Mechanical Ventilation:      #hx lung mass, SCC c/b fluid overload  - Required CPAP 10/5 50% now weaned off to NC 2 L  - saturating well   - continue to monitor sp02  ====================CARDIOVASCULAR==================  DOBUTamine Infusion 10 MICROgram(s)/kG/Min (12.2 mL/Hr) IV Continuous <Continuous>    #HFrEF (last know EF%), acute decompensated heart failure  - on home dobutamine @ 7.5 mcg increased to 10 mcg given low Venous sat of 53% which gives him a CI of 2.3   - Unable to float swan however has a cordis for monitoring of venous sats  - borderline BPs without room for afterload reduction  - holding GDMT i/s/o shock  - continue strict I&O  - continue to trend lactate and perfusion indices    #CAD  - on aspirin and plavix at home  - continue  ===================HEMATOLOGIC/ONC ===================  aspirin enteric coated 81 milliGRAM(s) Oral daily  clopidogrel Tablet 75 milliGRAM(s) Oral daily    - H/H stable  - platelets low  but appears chronic  - continue to trend  DVT ppx: SCDs, given thrombocytopenia  ===================== RENAL =========================  Continue monitoring urine output    BRODY on CKD  - sCr 3.42, baseline usually around 2  - likely i/s/o cardiogenic shock  - strict I&O, trend renal function daily  ==================== GASTROINTESTINAL===================  - diet as tolerated  - monitor for BM  =======================    ENDOCRINE  =====================  - f/u a1c, trend glucose on cmp  - f/u tsh and lipid profile  ========================INFECTIOUS DISEASE================  cefepime   IVPB 1000 milliGRAM(s) IV Intermittent every 12 hours  cefepime   IVPB        - hypothermic on admission  - f/u infectious w/u  - continue to trend wbc and fever curve  - Started on Vanco and cefepime empirically given hypothermia.   - Will remove home PICC line.       Patient requires continuous monitoring with bedside rhythm monitoring, pulse ox monitoring, and intermittent blood gas analysis. Care plan discussed with ICU care team. Patient remained critical and at risk for life threatening decompensation.  Patient seen, examined and plan discussed with CCU team during rounds.     I have personally provided ____ minutes of critical care time excluding time spent on separate procedures, in addition to initial critical care time provided by the CICU Attending, Dr. Mohamud     By signing my name below, I, Inessa Ospina, attest that this documentation has been prepared under the direction and in the presence of BHUMIKA Hamilton  Electronically signed: Destini Salguero, 09-09-24 @ 21:02    I, BHUMIKA Hamilton, personally performed the services described in this documentation. all medical record entries made by the priceibe were at my direction and in my presence. I have reviewed the chart and agree that the record reflects my personal performance and is accurate and complete  Electronically signed: BHUMIKA Hamilton   ELYSE MALAVE  MRN-[id number]  Patient is a 79y old  Male who presents with a chief complaint of cardiogenic shock (09 Sep 2024 20:27)    HPI:  79-year-old male with a history of HFrEF on home dobutamine 7.5 with last EF less than 20%, CAD, lungs mass - SCC, and urinary retention presented transferred from [facility] for management of heart failure after having syncopal episode last night.  Patient also having reduced urine output despite 6 mg of Bumex twice a day.  At [facility] patient had a workup which was notable for sodium of 124, creatinine to 4.6 from his baseline of approximately 2-2.1, proBNP 5220, lactate 1.9.  He was transferred here for further evaluation and  heart failure management, admitted to CICU.    On admission to CICU he is A&O, saturating well on room air, sinus rhythm 75 /59. He denies dizziness, chest pain, sob, N&V on admission. (07 Sep 2024 23:06)    24 HOUR EVENTS: Shiley d/'d, appropriate UOP.    ICU Vital Signs Last 24 Hrs  T(C): 36.2 (09 Sep 2024 19:00), Max: 36.5 (09 Sep 2024 03:00)  T(F): 97.2 (09 Sep 2024 19:00), Max: 97.7 (09 Sep 2024 03:00)  HR: 81 (09 Sep 2024 19:00) (78 - 89)  BP: 99/54 (09 Sep 2024 19:00) (81/53 - 116/56)  BP(mean): 68 (09 Sep 2024 19:00) (62 - 81)  RR: 22 (09 Sep 2024 19:00) (14 - 25)  SpO2: 95% (09 Sep 2024 19:00) (92% - 99%)    O2 Parameters below as of 09 Sep 2024 19:00  Patient On (Oxygen Delivery Method): room air    CVP(mm Hg): 23 (09-09-24 @ 19:00) (10 - 23)    I&O's Summary  08 Sep 2024 07:01  -  09 Sep 2024 07:00  --------------------------------------------------------  IN: 1006 mL / OUT: 5016 mL / NET: -4010 mL    09 Sep 2024 07:01  -  09 Sep 2024 21:01  --------------------------------------------------------  IN: 859.5 mL / OUT: 1900 mL / NET: -1040.5 mL    CAPILLARY BLOOD GLUCOSE  POCT Blood Glucose.: 108 mg/dL (08 Sep 2024 13:12)    ============================I/O===========================   I&O's Detail    08 Sep 2024 07:01  -  09 Sep 2024 07:00  --------------------------------------------------------  IN:    Bumetanide: 40 mL    DOBUTamine: 276 mL    IV PiggyBack: 450 mL    Oral Fluid: 240 mL  Total IN: 1006 mL    OUT:    Indwelling Catheter - Urethral (mL): 4845 mL    Other (mL): 171 mL  Total OUT: 5016 mL    Total NET: -4010 mL      09 Sep 2024 07:01  -  09 Sep 2024 21:01  --------------------------------------------------------  IN:    DOBUTamine: 149.5 mL    IV PiggyBack: 110 mL    Oral Fluid: 600 mL  Total IN: 859.5 mL    OUT:    Indwelling Catheter - Urethral (mL): 1900 mL  Total OUT: 1900 mL    Total NET: -1040.5 mL    ============================ LABS =========================                        8.0    5.92  )-----------( 68       ( 09 Sep 2024 00:27 )             24.5     09-09    130<L>  |  94<L>  |  79<H>  ----------------------------<  104<H>  3.9   |  21<L>  |  3.42<H>    Ca    8.1<L>      09 Sep 2024 00:28  Phos  4.7     09-09  Mg     2.5     09-09    TPro  6.2  /  Alb  2.8<L>  /  TBili  2.2<H>  /  DBili  x   /  AST  63<H>  /  ALT  27  /  AlkPhos  235<H>  09-09    Troponin T, High Sensitivity Result: 152 ng/L (09-07-24 @ 16:38)    LIVER FUNCTIONS - ( 09 Sep 2024 00:28 )  Alb: 2.8 g/dL / Pro: 6.2 g/dL / ALK PHOS: 235 U/L / ALT: 27 U/L / AST: 63 U/L / GGT: x           PTT - ( 07 Sep 2024 22:56 )  PTT:32.8 sec    Blood Gas Venous - Lactate: 1.2 mmol/L (09-09-24 @ 00:00)  Blood Gas Venous - Lactate: 0.9 mmol/L (09-08-24 @ 15:05)  Blood Gas Venous - Lactate: 1.2 mmol/L (09-08-24 @ 08:19)  Blood Gas Venous - Lactate: 2.1 mmol/L (09-08-24 @ 04:15)  Blood Gas Venous - Lactate: 2.3 mmol/L (09-08-24 @ 02:13)  Blood Gas Venous - Lactate: 1.4 mmol/L (09-07-24 @ 23:34)  Lactate, Blood: 1.7 mmol/L (09-07-24 @ 20:07)  Lactate, Blood: 2.0 mmol/L (09-07-24 @ 16:38)  Lactate, Blood: 1.9 mmol/L (09-07-24 @ 10:45)    Urinalysis Basic - ( 09 Sep 2024 00:28 )    Color: x / Appearance: x / SG: x / pH: x  Gluc: 104 mg/dL / Ketone: x  / Bili: x / Urobili: x   Blood: x / Protein: x / Nitrite: x   Leuk Esterase: x / RBC: x / WBC x   Sq Epi: x / Non Sq Epi: x / Bacteria: x    ======================================================  PAST MEDICAL & SURGICAL HISTORY:  BPH (benign prostatic hyperplasia)    Bipolar disorder    Depression    Anxiety    Umbilical hernia, incarcerated    Constipation    Urinary retention    CAD (coronary artery disease)    SCC (squamous cell carcinoma)    Lung mass    CHF, chronic    Heart failure with reduced ejection fraction    History of arthroscopy of knee  Right, 1987    History of tonsillectomy  1952    History of hernia repair    H/O coronary angiogram    ====================ASSESSMENT ==============  79-year-old male with a history of HFrEF on home dobutamine 7.5 with last EF less than 20%, CAD, lungs mass - SCC, and urinary retention presented transferred from [facility] hospital for management failure team after having syncopal episode last night, admitted for cardiogenic shock.    Plan:  ====================== NEUROLOGY=====================  - A&0 x3  - CT head post syncopal episode with no acute injury  - continue to monitor mental status per protocol    ==================== RESPIRATORY======================  #hx lung mass, SCC c/b fluid overload  - Required CPAP 10/5 50% now weaned off to NC 2 L  - saturating well   - continue to monitor sp02    ====================CARDIOVASCULAR==================  #HFrEF (last know EF%), acute decompensated heart failure  - on home dobutamine @ 7.5 mcg increased to 10 mcg given low Venous sat of 53% which gives him a CI of 2.3   - Unable to float swan however has a cordis for monitoring of venous sats  - borderline BPs without room for afterload reduction  - holding GDMT i/s/o shock  - continue strict I&O  - continue to trend lactate and perfusion indices    #CAD  - on aspirin and plavix at home  - continue    ===================HEMATOLOGIC/ONC ===================  - H/H stable  - platelets low  but appears chronic  - continue to trend  DVT ppx: SCDs, given thrombocytopenia    ===================== RENAL =========================  BRODY on CKD  - sCr 3.42, baseline usually around 2  - likely i/s/o cardiogenic shock  - strict I&O, trend renal function daily    ==================== GASTROINTESTINAL===================  - diet as tolerated  - monitor for BM    =======================    ENDOCRINE  =====================  A1c 5.8    ========================INFECTIOUS DISEASE================  hypothermic on admission  - f/u infectious w/u  - continue to trend wbc and fever curve  - Started on Vanco and cefepime empirically given hypothermia. Continue cefepime  - Will remove home PICC line.       Patient requires continuous monitoring with bedside rhythm monitoring, pulse ox monitoring, and intermittent blood gas analysis. Care plan discussed with ICU care team. Patient remained critical and at risk for life threatening decompensation.  Patient seen, examined and plan discussed with CCU team during rounds.     I have personally provided 35 minutes of critical care time excluding time spent on separate procedures, in addition to initial critical care time provided by the CICU Attending, Dr. Mohamud     By signing my name below, I, Inessa Ospina, attest that this documentation has been prepared under the direction and in the presence of BHUMIKA Hamilton  Electronically signed: Destini Salguero, 09-09-24 @ 21:02    I, BHUMIKA Hamilton, personally performed the services described in this documentation. all medical record entries made by the priceibtomer were at my direction and in my presence. I have reviewed the chart and agree that the record reflects my personal performance and is accurate and complete  Electronically signed: BHUMIKA Hamilton

## 2024-09-09 NOTE — CONSULT NOTE ADULT - SUBJECTIVE AND OBJECTIVE BOX
Wound Surgery Consult Note:    HPI:  79-year-old male with a history of HFrEF on home dobutamine 7.5 with last EF less than 20%, CAD, lungs mass - SCC, and urinary retention presented transferred from [facility] for management of heart failure after having syncopal episode last night.  Patient also having reduced urine output despite 6 mg of Bumex twice a day.  At [facility] patient had a workup which was notable for sodium of 124, creatinine to 4.6 from his baseline of approximately 2-2.1, proBNP 5220, lactate 1.9.  He was transferred here for further evaluation and  heart failure management, admitted to CICU.    On admission to CICU he is A&O, saturating well on room air, sinus rhythm 75 /59. He denies dizziness, chest pain, sob, N&V on admission. (07 Sep 2024 23:06)    Request for wound care evaluation of the sacrum and bilateral buttocks, and flank received from nursing. Mr. Becerra was encountered on an alternating air with low air loss surface. His immobility, inactivity in addition to poor nutritional status all contribute to his risk of pressure injury development and hinder healing. Principles of pressure injury prevention including but not limited to turning and positioning reviewed with patient.     PAST MEDICAL & SURGICAL HISTORY:  BPH (benign prostatic hyperplasia)  Bipolar disorder  Depression  Anxiety  Umbilical hernia, incarcerated  Constipation  Urinary retention  CAD (coronary artery disease)  SCC (squamous cell carcinoma)  Lung mass  CHF, chronic  Heart failure with reduced ejection fraction  History of arthroscopy of knee, Right, 1987  History of tonsillectomy, 1952  History of hernia repair  H/O coronary angiogram    REVIEW OF SYSTEMS  CONSTITUTIONAL: no weakness, no fevers or chills  EYES/ENT: No visual changes;  No vertigo or throat pain   MOUTH: No oral lesion, moist  NECK: No pain or stiffness  RESPIRATORY: No cough, wheezing, hemoptysis; No shortness of breath  CARDIOVASCULAR: No chest pain or palpitations  GASTROINTESTINAL: No abdominal or epigastric pain. No nausea, vomiting, or hematemesis; No diarrhea or constipation. No melena or hematochezia.  GENITOURINARY: + dysuria, no frequency or hematuria  NEUROLOGICAL: no weakness or numbness  SKIN: No itching, rashes  PSYCH: no confusion or altered mental status    MEDICATIONS  (STANDING):  aspirin enteric coated 81 milliGRAM(s) Oral daily  cefepime   IVPB 1000 milliGRAM(s) IV Intermittent every 12 hours  cefepime   IVPB      chlorhexidine 2% Cloths 1 Application(s) Topical daily  clopidogrel Tablet 75 milliGRAM(s) Oral daily  DOBUTamine Infusion 10 MICROgram(s)/kG/Min (12.2 mL/Hr) IV Continuous <Continuous>  melatonin 5 milliGRAM(s) Oral at bedtime    MEDICATIONS  (PRN):    Allergies    No Known Allergies    Intolerances    atorvastatin (Muscle Pain (Severe))    SOCIAL HISTORY:  ; Denies smoking, ETOH, drugs    FAMILY HISTORY:  Family history of depression (Mother)    FH: CAD (coronary artery disease) (Sibling, Sibling)    Family history of diabetes mellitus (DM) (Sibling)    Vital Signs Last 24 Hrs  T(C): 36.1 (09 Sep 2024 11:00), Max: 36.6 (08 Sep 2024 18:00)  T(F): 97 (09 Sep 2024 11:00), Max: 97.9 (08 Sep 2024 18:00)  HR: 80 (09 Sep 2024 12:00) (78 - 89)  BP: 87/52 (09 Sep 2024 12:00) (87/50 - 116/56)  BP(mean): 63 (09 Sep 2024 12:00) (63 - 81)  RR: 14 (09 Sep 2024 12:00) (14 - 25)  SpO2: 94% (09 Sep 2024 12:00) (92% - 99%)    Parameters below as of 09 Sep 2024 11:00  Patient On (Oxygen Delivery Method): room air    Physical Exam:  General: alert, WN  Ophthamology: sclera clear  ENMT: moist mucous membranes, trachea midline  Respiratory: equal chest rise with respirations  Gastrointestinal: soft NT/ND  Neurology: verbal,  following commands  Psych: calm, cooperative  Musculoskeletal: no contractures  Vascular: BLE edema equal  Skin:  Sacral/bilateral buttocks/ischia/hips with 100% intact skin with extensive ecchymosis, no drainage  No odor, erythema, increased warmth, tenderness, induration, fluctuance    LABS:  09-09    130<L>  |  94<L>  |  79<H>  ----------------------------<  104<H>  3.9   |  21<L>  |  3.42<H>    Ca    8.1<L>      09 Sep 2024 00:28  Phos  4.7     09-09  Mg     2.5     09-09    TPro  6.2  /  Alb  2.8<L>  /  TBili  2.2<H>  /  DBili  x   /  AST  63<H>  /  ALT  27  /  AlkPhos  235<H>  09-09                          8.0    5.92  )-----------( 68       ( 09 Sep 2024 00:27 )             24.5     PTT - ( 07 Sep 2024 22:56 )  PTT:32.8 sec  Urinalysis Basic - ( 09 Sep 2024 00:28 )    Color: x / Appearance: x / SG: x / pH: x  Gluc: 104 mg/dL / Ketone: x  / Bili: x / Urobili: x   Blood: x / Protein: x / Nitrite: x   Leuk Esterase: x / RBC: x / WBC x   Sq Epi: x / Non Sq Epi: x / Bacteria: x               Wound Surgery Consult Note:    HPI:  79-year-old male with a history of HFrEF on home dobutamine 7.5 with last EF less than 20%, CAD, lungs mass - SCC, and urinary retention presented transferred from [facility] for management of heart failure after having syncopal episode last night.  Patient also having reduced urine output despite 6 mg of Bumex twice a day.  At [facility] patient had a workup which was notable for sodium of 124, creatinine to 4.6 from his baseline of approximately 2-2.1, proBNP 5220, lactate 1.9.  He was transferred here for further evaluation and  heart failure management, admitted to CICU.    On admission to CICU he is A&O, saturating well on room air, sinus rhythm 75 /59. He denies dizziness, chest pain, sob, N&V on admission. (07 Sep 2024 23:06)    Request for wound care evaluation of the sacrum and bilateral buttocks, and flank received from nursing. Mr. Becerra was encountered on an alternating air with low air loss surface. He stated thAT he fell and bruised his buttocks. His immobility, inactivity in addition to poor nutritional status all contribute to his risk of pressure injury development and hinder healing. Principles of pressure injury prevention including but not limited to turning and positioning reviewed with patient.     PAST MEDICAL & SURGICAL HISTORY:  BPH (benign prostatic hyperplasia)  Bipolar disorder  Depression  Anxiety  Umbilical hernia, incarcerated  Constipation  Urinary retention  CAD (coronary artery disease)  SCC (squamous cell carcinoma)  Lung mass  CHF, chronic  Heart failure with reduced ejection fraction  History of arthroscopy of knee, Right, 1987  History of tonsillectomy, 1952  History of hernia repair  H/O coronary angiogram    REVIEW OF SYSTEMS  CONSTITUTIONAL: no weakness, no fevers or chills  EYES/ENT: No visual changes;  No vertigo or throat pain   MOUTH: No oral lesion, moist  NECK: No pain or stiffness  RESPIRATORY: No cough, wheezing, hemoptysis; No shortness of breath  CARDIOVASCULAR: No chest pain or palpitations  GASTROINTESTINAL: No abdominal or epigastric pain. No nausea, vomiting, or hematemesis; No diarrhea or constipation. No melena or hematochezia.  GENITOURINARY: + dysuria, no frequency or hematuria  NEUROLOGICAL: no weakness or numbness  SKIN: No itching, rashes  PSYCH: no confusion or altered mental status    MEDICATIONS  (STANDING):  aspirin enteric coated 81 milliGRAM(s) Oral daily  cefepime   IVPB 1000 milliGRAM(s) IV Intermittent every 12 hours  cefepime   IVPB      chlorhexidine 2% Cloths 1 Application(s) Topical daily  clopidogrel Tablet 75 milliGRAM(s) Oral daily  DOBUTamine Infusion 10 MICROgram(s)/kG/Min (12.2 mL/Hr) IV Continuous <Continuous>  melatonin 5 milliGRAM(s) Oral at bedtime    MEDICATIONS  (PRN):    Allergies    No Known Allergies    Intolerances    atorvastatin (Muscle Pain (Severe))    SOCIAL HISTORY:  ; Denies smoking, ETOH, drugs    FAMILY HISTORY:  Family history of depression (Mother)    FH: CAD (coronary artery disease) (Sibling, Sibling)    Family history of diabetes mellitus (DM) (Sibling)    Vital Signs Last 24 Hrs  T(C): 36.1 (09 Sep 2024 11:00), Max: 36.6 (08 Sep 2024 18:00)  T(F): 97 (09 Sep 2024 11:00), Max: 97.9 (08 Sep 2024 18:00)  HR: 80 (09 Sep 2024 12:00) (78 - 89)  BP: 87/52 (09 Sep 2024 12:00) (87/50 - 116/56)  BP(mean): 63 (09 Sep 2024 12:00) (63 - 81)  RR: 14 (09 Sep 2024 12:00) (14 - 25)  SpO2: 94% (09 Sep 2024 12:00) (92% - 99%)    Parameters below as of 09 Sep 2024 11:00  Patient On (Oxygen Delivery Method): room air    Physical Exam:  General: alert, WN  Ophthamology: sclera clear  ENMT: moist mucous membranes, trachea midline  Respiratory: equal chest rise with respirations  Gastrointestinal: soft NT/ND  Neurology: verbal,  following commands  Psych: calm, cooperative  Musculoskeletal: no contractures  Vascular: BLE edema equal  Skin:  Sacral/bilateral buttocks/ischia/hips with 100% intact skin with extensive ecchymosis, no drainage  No odor, erythema, increased warmth, tenderness, induration, fluctuance    LABS:  09-09    130<L>  |  94<L>  |  79<H>  ----------------------------<  104<H>  3.9   |  21<L>  |  3.42<H>    Ca    8.1<L>      09 Sep 2024 00:28  Phos  4.7     09-09  Mg     2.5     09-09    TPro  6.2  /  Alb  2.8<L>  /  TBili  2.2<H>  /  DBili  x   /  AST  63<H>  /  ALT  27  /  AlkPhos  235<H>  09-09                          8.0    5.92  )-----------( 68       ( 09 Sep 2024 00:27 )             24.5     PTT - ( 07 Sep 2024 22:56 )  PTT:32.8 sec  Urinalysis Basic - ( 09 Sep 2024 00:28 )    Color: x / Appearance: x / SG: x / pH: x  Gluc: 104 mg/dL / Ketone: x  / Bili: x / Urobili: x   Blood: x / Protein: x / Nitrite: x   Leuk Esterase: x / RBC: x / WBC x   Sq Epi: x / Non Sq Epi: x / Bacteria: x

## 2024-09-09 NOTE — CONSULT NOTE ADULT - ASSESSMENT
Impression:    Sacral/bilateral Buttocks/ischia/hip ecchymosis  Pressure Injury Prophylaxis    Recommend:  1.) topical therapy: sacral/buttock skin – cleanse with incontinence cleanser, pat dry, apply Homar ointment BID and PRN for incontinent episodes  2.) Incontinence Management - incontinence cleanser, pads, pericare BID  3.) Maintain on an alternating air with low air loss surface  4.) Turn and reposition Q 2 hours  5.) Nutrition optimization   6.) Offload heels/feet with complete cair air fluidized boots/pillows; ensure that the soles of the feet are not resting on the foot board of the bed.  7.) chair cushion for chair sitting    Care as per medicine. Will not actively follow but will remain available. Please recall for new issues or deterioration.  Thank you for this consult  Sol Trevino, ELDON-C, CWOCN via TEAMS

## 2024-09-09 NOTE — PROGRESS NOTE ADULT - SUBJECTIVE AND OBJECTIVE BOX
Seen in CCU, comfortable on RA    Vital Signs Last 24 Hrs  T(C): 36.2 (09-09-24 @ 19:00), Max: 36.5 (09-09-24 @ 03:00)  T(F): 97.2 (09-09-24 @ 19:00), Max: 97.7 (09-09-24 @ 03:00)  HR: 81 (09-09-24 @ 19:00) (78 - 89)  BP: 99/54 (09-09-24 @ 19:00) (81/53 - 116/56)  BP(mean): 68 (09-09-24 @ 19:00) (62 - 81)  RR: 22 (09-09-24 @ 19:00) (14 - 25)  SpO2: 95% (09-09-24 @ 19:00) (92% - 99%)    I&O's Detail    08 Sep 2024 07:01  -  09 Sep 2024 07:00  --------------------------------------------------------  IN:    Bumetanide: 40 mL    DOBUTamine: 276 mL    IV PiggyBack: 450 mL    Oral Fluid: 240 mL  Total IN: 1006 mL    OUT:    Indwelling Catheter - Urethral (mL): 4845 mL    Other (mL): 171 mL  Total OUT: 5016 mL    09 Sep 2024 07:01  -  09 Sep 2024 20:30  --------------------------------------------------------  IN:    DOBUTamine: 149.5 mL    IV PiggyBack: 110 mL    Oral Fluid: 600 mL  Total IN: 859.5 mL    OUT:    Indwelling Catheter - Urethral (mL): 1900 mL  Total OUT: 1900 mL    Respiratory b/l air entry  Cardiovascular S1 S2  Gastrointestinal soft, ND  Extremities no edema  AO                        8.0    5.92  )-----------( 68       ( 09 Sep 2024 00:27 )             24.5     09 Sep 2024 00:28    130    |  94     |  79     ----------------------------<  104    3.9     |  21     |  3.42     Ca    8.1        09 Sep 2024 00:28  Phos  4.7       09 Sep 2024 00:28  Mg     2.5       09 Sep 2024 00:28    TPro  6.2    /  Alb  2.8    /  TBili  2.2    /  DBili  x      /  AST  63     /  ALT  27     /  AlkPhos  235    09 Sep 2024 00:28    LIVER FUNCTIONS - ( 09 Sep 2024 00:28 )  Alb: 2.8 g/dL / Pro: 6.2 g/dL / ALK PHOS: 235 U/L / ALT: 27 U/L / AST: 63 U/L / GGT: x           PTT - ( 07 Sep 2024 22:56 )  PTT:32.8 sec    Culture - Blood (collected 07 Sep 2024 16:30)  Source: .Blood Blood  Preliminary Report (08 Sep 2024 22:01):    No growth at 24 hours    Culture - Blood (collected 07 Sep 2024 16:15)  Source: .Blood Blood  Preliminary Report (08 Sep 2024 22:01):    No growth at 24 hours    aspirin enteric coated 81 milliGRAM(s) Oral daily  cefepime   IVPB 1000 milliGRAM(s) IV Intermittent every 12 hours  cefepime   IVPB      chlorhexidine 2% Cloths 1 Application(s) Topical daily  clopidogrel Tablet 75 milliGRAM(s) Oral daily  DOBUTamine Infusion 10 MICROgram(s)/kG/Min IV Continuous <Continuous>  melatonin 5 milliGRAM(s) Oral at bedtime  mupirocin 2% Nasal 1 Application(s) Both Nostrils two times a day    Assessment and Recommendation:     CM, EF ~ 20 - 25%, mod MR, lung ca  Cardio-renal BRODY/CKD 3/4 (baseline Cr ~ 2.)   SONO w/mild R hydro   Good UO  Diuresis per CCU team  No further RRT is planned  Avoid nephrotoxins  F/u BMP, UO  If Cr is rising would get CT A/P (no IV dye) to eval R hydro  Will follow    [phone]

## 2024-09-09 NOTE — PROGRESS NOTE ADULT - SUBJECTIVE AND OBJECTIVE BOX
INTERVAL HPI/OVERNIGHT EVENTS:    SUBJECTIVE: Patient seen and examined at bedside.     ROS: All negative except as listed above.    VITAL SIGNS:  ICU Vital Signs Last 24 Hrs  T(C): 36.5 (09 Sep 2024 03:00), Max: 36.6 (08 Sep 2024 18:00)  T(F): 97.7 (09 Sep 2024 03:00), Max: 97.9 (08 Sep 2024 18:00)  HR: 81 (09 Sep 2024 06:00) (71 - 89)  BP: 93/55 (09 Sep 2024 06:00) (86/54 - 116/56)  BP(mean): 69 (09 Sep 2024 06:00) (63 - 81)  ABP: --  ABP(mean): --  RR: 20 (09 Sep 2024 06:00) (15 - 34)  SpO2: 95% (09 Sep 2024 06:00) (95% - 100%)    O2 Parameters below as of 09 Sep 2024 06:00  Patient On (Oxygen Delivery Method): nasal cannula  O2 Flow (L/min): 2          Plateau pressure:   P/F ratio:     09-08 @ 07:01  -  09-09 @ 07:00  --------------------------------------------------------  IN: 1006 mL / OUT: 4791 mL / NET: -3785 mL      CAPILLARY BLOOD GLUCOSE      POCT Blood Glucose.: 108 mg/dL (08 Sep 2024 13:12)      ECG: reviewed.    PHYSICAL EXAM:    GENERAL: NAD, lying in bed comfortably  HEAD:  Atraumatic, normocephalic  EYES: EOMI, PERRLA, conjunctiva and sclera clear  NECK: Supple, trachea midline, no JVD  HEART: Regular rate and rhythm, no murmurs, rubs, or gallops  LUNGS: Unlabored respirations.  Clear to auscultation bilaterally, no crackles, wheezing, or rhonchi  ABDOMEN: Soft, nontender, nondistended, +BS  EXTREMITIES: 2+ peripheral pulses bilaterally, cap refill<2 secs. No clubbing, cyanosis, or edema  NERVOUS SYSTEM:  A&Ox3, following commands, moving all extremities, no focal deficits   SKIN: No rashes or lesions    MEDICATIONS:  MEDICATIONS  (STANDING):  aspirin enteric coated 81 milliGRAM(s) Oral daily  cefepime   IVPB      cefepime   IVPB 1000 milliGRAM(s) IV Intermittent every 12 hours  chlorhexidine 2% Cloths 1 Application(s) Topical daily  clopidogrel Tablet 75 milliGRAM(s) Oral daily  CRRT Treatment    <Continuous>  DOBUTamine Infusion 10 MICROgram(s)/kG/Min (12.2 mL/Hr) IV Continuous <Continuous>  melatonin 5 milliGRAM(s) Oral at bedtime  PrismaSATE Dialysate BGK 4 / 2.5 5000 milliLiter(s) (1000 mL/Hr) CRRT <Continuous>  PrismaSOL Filtration BGK 4 / 2.5 5000 milliLiter(s) (600 mL/Hr) CRRT <Continuous>  PrismaSOL Filtration BGK 4 / 2.5 5000 milliLiter(s) (800 mL/Hr) CRRT <Continuous>    MEDICATIONS  (PRN):      ALLERGIES:  Allergies    No Known Allergies    Intolerances    atorvastatin (Muscle Pain (Severe))      LABS:                        8.0    5.92  )-----------( 68       ( 09 Sep 2024 00:27 )             24.5     09-09    130<L>  |  94<L>  |  79<H>  ----------------------------<  104<H>  3.9   |  21<L>  |  3.42<H>    Ca    8.1<L>      09 Sep 2024 00:28  Phos  4.7     09-09  Mg     2.5     09-09    TPro  6.2  /  Alb  2.8<L>  /  TBili  2.2<H>  /  DBili  x   /  AST  63<H>  /  ALT  27  /  AlkPhos  235<H>  09-09    PT/INR - ( 07 Sep 2024 10:45 )   PT: 14.8 sec;   INR: 1.31 ratio         PTT - ( 07 Sep 2024 22:56 )  PTT:32.8 sec  Urinalysis Basic - ( 09 Sep 2024 00:28 )    Color: x / Appearance: x / SG: x / pH: x  Gluc: 104 mg/dL / Ketone: x  / Bili: x / Urobili: x   Blood: x / Protein: x / Nitrite: x   Leuk Esterase: x / RBC: x / WBC x   Sq Epi: x / Non Sq Epi: x / Bacteria: x      ABG:      vBG:  pH, Venous: 7.40 (09-09-24 @ 00:00)  pCO2, Venous: 37 mmHg (09-09-24 @ 00:00)  pO2, Venous: 37 mmHg (09-09-24 @ 00:00)  HCO3, Venous: 23 mmol/L (09-09-24 @ 00:00)  pH, Venous: 7.37 (09-08-24 @ 15:05)  pCO2, Venous: 32 mmHg (09-08-24 @ 15:05)  pO2, Venous: 38 mmHg (09-08-24 @ 15:05)  HCO3, Venous: 18 mmol/L (09-08-24 @ 15:05)  pH, Venous: 7.32 (09-08-24 @ 08:19)  pCO2, Venous: 34 mmHg (09-08-24 @ 08:19)  pO2, Venous: 32 mmHg (09-08-24 @ 08:19)  HCO3, Venous: 18 mmol/L (09-08-24 @ 08:19)    Micro:    Culture - Blood (collected 09-07-24 @ 16:30)  Source: .Blood Blood  Preliminary Report (09-08-24 @ 22:01):    No growth at 24 hours    Culture - Blood (collected 09-07-24 @ 16:15)  Source: .Blood Blood  Preliminary Report (09-08-24 @ 22:01):    No growth at 24 hours          RADIOLOGY & ADDITIONAL TESTS: Reviewed.   INTERVAL HPI/OVERNIGHT EVENTS:    SUBJECTIVE: Patient seen and examined at bedside, feels well. Denies chest pain, palpitations, SOB, N/V/D.      ROS: All negative except as listed above.    VITAL SIGNS:  ICU Vital Signs Last 24 Hrs  T(C): 36.5 (09 Sep 2024 03:00), Max: 36.6 (08 Sep 2024 18:00)  T(F): 97.7 (09 Sep 2024 03:00), Max: 97.9 (08 Sep 2024 18:00)  HR: 81 (09 Sep 2024 06:00) (71 - 89)  BP: 93/55 (09 Sep 2024 06:00) (86/54 - 116/56)  BP(mean): 69 (09 Sep 2024 06:00) (63 - 81)  ABP: --  ABP(mean): --  RR: 20 (09 Sep 2024 06:00) (15 - 34)  SpO2: 95% (09 Sep 2024 06:00) (95% - 100%)    O2 Parameters below as of 09 Sep 2024 06:00  Patient On (Oxygen Delivery Method): nasal cannula  O2 Flow (L/min): 2          Plateau pressure:   P/F ratio:     09-08 @ 07:01  -  09-09 @ 07:00  --------------------------------------------------------  IN: 1006 mL / OUT: 4791 mL / NET: -3785 mL      CAPILLARY BLOOD GLUCOSE      POCT Blood Glucose.: 108 mg/dL (08 Sep 2024 13:12)      ECG: reviewed.    PHYSICAL EXAM:    GENERAL: NAD, lying in bed comfortably  HEAD:  Atraumatic, normocephalic  EYES: EOMI, PERRLA, conjunctiva and sclera clear  NECK: Supple, trachea midline, no JVD  HEART: Regular rate and rhythm, no murmurs, rubs, or gallops  LUNGS: Unlabored respirations.  Clear to auscultation bilaterally, no crackles, wheezing, or rhonchi  ABDOMEN: Soft, nontender, nondistended, +BS  EXTREMITIES: 2+ peripheral pulses bilaterally, cap refill<2 secs. No clubbing, cyanosis, or edema  NERVOUS SYSTEM:  A&Ox3, following commands, moving all extremities, no focal deficits   SKIN: No rashes or lesions    MEDICATIONS:  MEDICATIONS  (STANDING):  aspirin enteric coated 81 milliGRAM(s) Oral daily  cefepime   IVPB      cefepime   IVPB 1000 milliGRAM(s) IV Intermittent every 12 hours  chlorhexidine 2% Cloths 1 Application(s) Topical daily  clopidogrel Tablet 75 milliGRAM(s) Oral daily  CRRT Treatment    <Continuous>  DOBUTamine Infusion 10 MICROgram(s)/kG/Min (12.2 mL/Hr) IV Continuous <Continuous>  melatonin 5 milliGRAM(s) Oral at bedtime  PrismaSATE Dialysate BGK 4 / 2.5 5000 milliLiter(s) (1000 mL/Hr) CRRT <Continuous>  PrismaSOL Filtration BGK 4 / 2.5 5000 milliLiter(s) (600 mL/Hr) CRRT <Continuous>  PrismaSOL Filtration BGK 4 / 2.5 5000 milliLiter(s) (800 mL/Hr) CRRT <Continuous>    MEDICATIONS  (PRN):      ALLERGIES:  Allergies    No Known Allergies    Intolerances    atorvastatin (Muscle Pain (Severe))      LABS:                        8.0    5.92  )-----------( 68       ( 09 Sep 2024 00:27 )             24.5     09-09    130<L>  |  94<L>  |  79<H>  ----------------------------<  104<H>  3.9   |  21<L>  |  3.42<H>    Ca    8.1<L>      09 Sep 2024 00:28  Phos  4.7     09-09  Mg     2.5     09-09    TPro  6.2  /  Alb  2.8<L>  /  TBili  2.2<H>  /  DBili  x   /  AST  63<H>  /  ALT  27  /  AlkPhos  235<H>  09-09    PT/INR - ( 07 Sep 2024 10:45 )   PT: 14.8 sec;   INR: 1.31 ratio         PTT - ( 07 Sep 2024 22:56 )  PTT:32.8 sec  Urinalysis Basic - ( 09 Sep 2024 00:28 )    Color: x / Appearance: x / SG: x / pH: x  Gluc: 104 mg/dL / Ketone: x  / Bili: x / Urobili: x   Blood: x / Protein: x / Nitrite: x   Leuk Esterase: x / RBC: x / WBC x   Sq Epi: x / Non Sq Epi: x / Bacteria: x      ABG:      vBG:  pH, Venous: 7.40 (09-09-24 @ 00:00)  pCO2, Venous: 37 mmHg (09-09-24 @ 00:00)  pO2, Venous: 37 mmHg (09-09-24 @ 00:00)  HCO3, Venous: 23 mmol/L (09-09-24 @ 00:00)  pH, Venous: 7.37 (09-08-24 @ 15:05)  pCO2, Venous: 32 mmHg (09-08-24 @ 15:05)  pO2, Venous: 38 mmHg (09-08-24 @ 15:05)  HCO3, Venous: 18 mmol/L (09-08-24 @ 15:05)  pH, Venous: 7.32 (09-08-24 @ 08:19)  pCO2, Venous: 34 mmHg (09-08-24 @ 08:19)  pO2, Venous: 32 mmHg (09-08-24 @ 08:19)  HCO3, Venous: 18 mmol/L (09-08-24 @ 08:19)    Micro:    Culture - Blood (collected 09-07-24 @ 16:30)  Source: .Blood Blood  Preliminary Report (09-08-24 @ 22:01):    No growth at 24 hours    Culture - Blood (collected 09-07-24 @ 16:15)  Source: .Blood Blood  Preliminary Report (09-08-24 @ 22:01):    No growth at 24 hours          RADIOLOGY & ADDITIONAL TESTS: Reviewed.

## 2024-09-09 NOTE — PROGRESS NOTE ADULT - ASSESSMENT
79-year-old male with a history of HFrEF on home dobutamine 7.5 with last EF less than 20%, CAD, lungs mass - SCC, and urinary retention presented transferred from clinical hospital for management failure team after having syncopal episode last night, admitted for cardiogenic shock.    Neuro  - A&0 x3  - CT head post syncopal episode with no acute injury  - continue to monitor mental status per protocol    Respiratoy  #hx lung mass, SCC c/b fluid overload  - Required CPAP 10/5 50% now weaned off to NC 2 L  - saturating well   - continue to monitor sp02    Cardiovascular  #HFrEF (last know EF%), acute decompensated heart failure  - on home dobutamine @ 7.5 mcg increased to 10 mcg given low Venous sat of 53% which gives him a CI of 2.3   - worsening perfusion indices with minimal UO now with CRRT 100 net fluid removal. CVP 25. He was on a bumex gtt and did start making UO > 100/hour prior to initiation of CRRT. WIll continue CRRT for now for more aggressive fluid removal and correction of metabolic derangements.   - Unable to float swan however has a cordis for monitoring of venous sats  - borderline BPs without room for afterload reduction  - holding GDMT i/s/o shock  - continue strict I&O  - continue to trend lactate and perfusion indices    #CAD  - on aspirin and plavix at home  - continue    Renal  BRODY on CKD  - sCr >4, baseline usually around 2  - likely i/s/o cardiogenic shock  - strict I&O, trend renal function daily    GI  - diet as tolerated  - monitor for BM    Endo  - f/u a1c, trend glucose on cmp  - f/u tsh and lipid profile    Heme  - H/H stable  - platelets low  but appears chronic  - continue to trend  DVT ppx: heparin subq    ID  - hypothermic on admission  - f/u infectious w/u  - continue to trend wbc and fever curve  - Started on Vanco and cefepime empirically given hypothermia.   - Will remove home PICC line.     #molst not with patient on this admission, spoke with patient who wishes to be full code. On last admission he chose to be DNR and had wished to not return to the hospital.     Lines: PIVs, LFV Shidorys and RIJ Cordis from 9/8    ======================= DISPOSITION  =====================  [X] Critical   [ ] Guarded    [ ] Stable    [X] Maintain in CICU  [ ] Downgrade to Telemetry  [ ] Discharge Home 79-year-old male with a history of HFrEF on home dobutamine 7.5 with last EF less than 20%, CAD, lungs mass - SCC, and urinary retention presented transferred from clinical hospital for management failure team after having syncopal episode last night, admitted for cardiogenic shock.    Neuro  - A&0 x3  - CT head post syncopal episode with no acute injury  - continue to monitor mental status per protocol    Respiratoy  #hx lung mass, SCC c/b fluid overload  - Required CPAP 10/5 50% now weaned off to NC 2 L  - saturating well   - continue to monitor sp02    Cardiovascular  #HFrEF (last know EF%), acute decompensated heart failure  - on home dobutamine @ 7.5 mcg increased to 10 mcg given low Venous sat of 53% which gives him a CI of 2.3   - Unable to float swan however has a cordis for monitoring of venous sats  - borderline BPs without room for afterload reduction  - holding GDMT i/s/o shock  - continue strict I&O  - continue to trend lactate and perfusion indices    #CAD  - on aspirin and plavix at home  - continue    Renal  BRODY on CKD  - sCr 3.42, baseline usually around 2  - likely i/s/o cardiogenic shock  - strict I&O, trend renal function daily    GI  - diet as tolerated  - monitor for BM    Endo  - f/u a1c, trend glucose on cmp  - f/u tsh and lipid profile    Heme  - H/H stable  - platelets low  but appears chronic  - continue to trend  DVT ppx: SCDs, given thrombocytopenia    ID  - hypothermic on admission  - f/u infectious w/u  - continue to trend wbc and fever curve  - Started on Vanco and cefepime empirically given hypothermia.   - Will remove home PICC line.     #Ethics: FULL CODE  #molst not with patient on this admission, spoke with patient who wishes to be full code. On last admission he chose to be DNR and had wished to not return to the hospital.     Lines: PIVs, LFV Jennifer and JENNIE Bowling from 9/8    ======================= DISPOSITION  =====================  [X] Critical   [ ] Guarded    [ ] Stable    [X] Maintain in CICU  [ ] Downgrade to Telemetry  [ ] Discharge Home

## 2024-09-09 NOTE — PROGRESS NOTE ADULT - ATTENDING COMMENTS
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 Will consult Palliative

## 2024-09-10 NOTE — CONSULT NOTE ADULT - PROBLEM SELECTOR RECOMMENDATION 2
see above GOC note for details  Code status: DNR/DNI, MOLST completed  HCP: Kenia Hurleya, wife. Form present in patient window  Patient appropriate for hospice services but patient and wife not in agreement with transitioning care at this time. GOC discussions ongoing.

## 2024-09-10 NOTE — CONSULT NOTE ADULT - CRITICAL CARE ATTENDING COMMENT
79yrs.   End stage ischemic heart failure. On home milrinone .5 since June 2024followed by Dr Sellers at [facility].   home meds: asa, plavix, bumex 6 BID, ald 25,  7.5, statin   Known TVD.   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   CKD baseline Cr 2.4  At baseline he can ambulate with a walker.   One week worsening SOB LE edema.   9.7: found unresponsive and lethargic to [facility]: Na 124, Cr 4.6. lactate 1.9.   Transferred to [facility]: Na 122, lactate 2.0 Cr 4.5, NTproBNP 6500.    increased to 10. Lack of response to diuretics. Attempted CVVHD. Circuit clotted and ultimately made urine.   Empiric abs for hypothermia.   Now: making urine with diuretics. back to baseline for the most part. no further ID issues.   meds:  10, PRN IV bumex, asa plavix.  cefipime.   HR 70-80SR, 90/, afebrile.   TTE 9.8: LVEF 20-25, LVEDD 5.8, RVE severe, mod RVD tapse 1.0,  mod MR, severe TR, IVC + RVSP 40,   I/O: -250, -4L   VBG 57%   09-10    130<L>  |  91<L>  |  88<H>  ----------------------------<  133<H>  3.5   |  22  |  3.83<H> 3.4, 3.4, 4.6     Ca    8.1<L>      10 Sep 2024 00:32  Phos  4.8     09-10  Mg     2.4     09-10    TPro  6.0  /  Alb  2.7<L>  /  TBili  2.0<H>  /  DBili  x   /  AST  58<H>  /  ALT  27  /  AlkPhos  245<H>  09-10                        7.4    4.99  )-----------( 64       ( 10 Sep 2024 00:32 )             23.4   lactate 1.4. 79yrs.   End stage ischemic heart failure. On home milrinone .5 since June 2024followed by Dr Sellers at [facility].   home meds: asa, plavix, bumex 6 BID, ald 25,  7.5, statin   Known TVD.   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   CKD baseline Cr 2.4  At baseline he can ambulate with a walker.   One week worsening SOB LE edema.   9.7: found unresponsive and lethargic to [facility]: Na 124, Cr 4.6. lactate 1.9.   Transferred to [facility]: Na 122, lactate 2.0 Cr 4.5, NTproBNP 6500.    increased to 10. Lack of response to diuretics. Attempted CVVHD. Circuit clotted and ultimately made urine.   Empiric abs for hypothermia.   Now: making urine with diuretics. back to baseline for the most part. no further ID issues.   meds:  10, PRN IV bumex, asa plavix.  cefipime.   CVP 9 HR 70-80SR, 90/, afebrile.   TTE 9.8: LVEF 20-25, LVEDD 5.8, RVE severe, mod RVD tapse 1.0,  mod MR, severe TR, IVC + RVSP 40,   I/O: -250, -4L   VBG 57%   09-10    130<L>  |  91<L>  |  88<H>  ----------------------------<  133<H>  3.5   |  22  |  3.83<H> 3.4, 3.4, 4.6     Ca    8.1<L>      10 Sep 2024 00:32  Phos  4.8     09-10  Mg     2.4     09-10    TPro  6.0  /  Alb  2.7<L>  /  TBili  2.0<H>  /  DBili  x   /  AST  58<H>  /  ALT  27  /  AlkPhos  245<H>  09-10                        7.4    4.99  )-----------( 64       ( 10 Sep 2024 00:32 )             23.4   lactate 1.4.  Discussed with CICU team.   End stage HF on  at home. Now admitted with ADHF and worsening renal failure.   Now euvolemic.   Likely tachyphalaxsis to . Could consider switch to low dose milrinone when reequilibrates.   Appreciate palliative care input.   Needs PT and standing weights if possible  Otoniel Baker 79yrs.   End stage ischemic heart failure. On home milrinone .5 since June 2024followed by Dr Sellers at [facility].   home meds: asa, plavix, bumex 6 BID, ald 25,  7.5, statin   Known TVD.   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   CKD baseline Cr 2.4  At baseline he can ambulate with a walker.   One week worsening SOB LE edema.   9.7: found unresponsive and lethargic to [facility]: Na 124, Cr 4.6. lactate 1.9.   Transferred to [facility]: Na 122, lactate 2.0 Cr 4.5, NTproBNP 6500.    increased to 10. Lack of response to diuretics. Attempted CVVHD. Circuit clotted and ultimately made urine.   Empiric abs for hypothermia.   Now: making urine with diuretics. back to baseline for the most part. no further ID issues.   meds:  10, PRN IV bumex, asa plavix.  cefipime.   CVP 9 HR 70-80SR, 90/, afebrile.   TTE 9.8: LVEF 20-25, LVEDD 5.8, RVE severe, mod RVD tapse 1.0,  mod MR, severe TR, IVC + RVSP 40,   I/O: -250, -4L   VBG 57%   09-10    130<L>  |  91<L>  |  88<H>  ----------------------------<  133<H>  3.5   |  22  |  3.83<H> 3.4, 3.4, 4.6     Ca    8.1<L>      10 Sep 2024 00:32  Phos  4.8     09-10  Mg     2.4     09-10    TPro  6.0  /  Alb  2.7<L>  /  TBili  2.0<H>  /  DBili  x   /  AST  58<H>  /  ALT  27  /  AlkPhos  245<H>  09-10                        7.4    4.99  )-----------( 64       ( 10 Sep 2024 00:32 )             23.4   lactate 1.4.  Discussed with CICU team.   End stage HF on  at home. Now admitted with ADHF and worsening renal failure.   Now euvolemic.   Likely tachyphalaxsis to . Could consider switch to low dose milrinone when reequilibrates.   Appreciate palliative care input.   Needs PT and standing weights if possible  Consider PRBCS  Otoniel Baker

## 2024-09-10 NOTE — PROGRESS NOTE ADULT - SUBJECTIVE AND OBJECTIVE BOX
Seen in CCU, comfortable on RA    Vital Signs Last 24 Hrs  T(C): 36.5 (09-10-24 @ 15:00), Max: 36.7 (09-10-24 @ 03:00)  T(F): 97.7 (09-10-24 @ 15:00), Max: 98.1 (09-10-24 @ 03:00)  HR: 80 (09-10-24 @ 18:00) (79 - 123)  BP: 89/50 (09-10-24 @ 18:00) (79/51 - 103/59)  BP(mean): 64 (09-10-24 @ 18:00) (60 - 76)  RR: 12 (09-10-24 @ 18:00) (12 - 27)  SpO2: 95% (09-10-24 @ 18:00) (92% - 96%)    I&O's Detail    09 Sep 2024 07:01  -  10 Sep 2024 07:00  --------------------------------------------------------  IN:    DOBUTamine: 276 mL    IV PiggyBack: 395 mL    Oral Fluid: 720 mL  Total IN: 1391 mL    OUT:    Indwelling Catheter - Urethral (mL): 3525 mL  Total OUT: 3525 mL    10 Sep 2024 07:01  -  10 Sep 2024 18:41  --------------------------------------------------------  IN:    DOBUTamine: 57.5 mL    DOBUTamine: 17.2 mL    DOBUTamine: 57.5 mL    IV PiggyBack: 105 mL    Oral Fluid: 240 mL    PRBCs (Packed Red Blood Cells): 300 mL  Total IN: 777.2 mL    OUT:    Indwelling Catheter - Urethral (mL): 2170 mL  Total OUT: 2170 mL    Respiratory b/l air entry  Cardiovascular S1 S2  Gastrointestinal soft, ND  Extremities no edema                        7.9    4.74  )-----------( 65       ( 10 Sep 2024 15:50 )             24.5     10 Sep 2024 15:50    134    |  92     |  87     ----------------------------<  122    3.4     |  24     |  4.15     Ca    8.5        10 Sep 2024 15:50  Phos  4.8       10 Sep 2024 00:32  Mg     2.4       10 Sep 2024 00:32    TPro  6.0    /  Alb  2.7    /  TBili  2.0    /  DBili  x      /  AST  58     /  ALT  27     /  AlkPhos  245    10 Sep 2024 00:32    LIVER FUNCTIONS - ( 10 Sep 2024 00:32 )  Alb: 2.7 g/dL / Pro: 6.0 g/dL / ALK PHOS: 245 U/L / ALT: 27 U/L / AST: 58 U/L / GGT: x           aspirin enteric coated 81 milliGRAM(s) Oral daily  cefepime   IVPB 1000 milliGRAM(s) IV Intermittent every 12 hours  cefepime   IVPB      chlorhexidine 2% Cloths 1 Application(s) Topical daily  clopidogrel Tablet 75 milliGRAM(s) Oral daily  DOBUTamine Infusion 10 MICROgram(s)/kG/Min IV Continuous <Continuous>  melatonin 5 milliGRAM(s) Oral at bedtime  mupirocin 2% Nasal 1 Application(s) Both Nostrils two times a day  polyethylene glycol 3350 17 Gram(s) Oral daily  senna 2 Tablet(s) Oral at bedtime    Assessment and Recommendation:     CM, EF ~ 20 - 25%, mod MR, lung ca  Cardio-renal BRODY/CKD 3/4 (baseline Cr ~ 2.)   SONO w/mild R hydro   Good UO  Diuresis per CCU team  Rising Cr, borderline BP noted   Avoid nephrotoxins  F/u BMP, UO  Could get CT A/P (no IV dye) to eval R hydro  Prognosis is guarded overall    [phone]

## 2024-09-10 NOTE — CONSULT NOTE ADULT - SUBJECTIVE AND OBJECTIVE BOX
Date of Service: 09-10-24 @ 12:54    HPI:  79-year-old male with a history of HFrEF on home dobutamine 7.5 with last EF less than 20%, CAD, lungs mass - SCC, and urinary retention presented transferred from [facility] for management of heart failure after having syncopal episode last night.  Patient also having reduced urine output despite 6 mg of Bumex twice a day.  At [facility] patient had a workup which was notable for sodium of 124, creatinine to 4.6 from his baseline of approximately 2-2.1, proBNP 5220, lactate 1.9.  He was transferred here for further evaluation and  heart failure management, admitted to CICU.    On admission to CICU he is A&O, saturating well on room air, sinus rhythm 75 /59. He denies dizziness, chest pain, sob, N&V on admission. (07 Sep 2024 23:06)    PERTINENT PM/SXH:   BPH (benign prostatic hyperplasia)  Bipolar disorder  Depression  Anxiety  Umbilical hernia, incarcerated  Inguinal hernia of right side without obstruction or gangrene  Constipation  Urinary retention  CAD (coronary artery disease)  SCC (squamous cell carcinoma)  Lung mass  Other nonspecific abnormal finding of lung field  LV (left ventricular) mural thrombus  Severe left ventricular systolic dysfunction  Heart failure with reduced ejection fraction    History of arthroscopy of knee  History of tonsillectomy  History of hernia repair  H/O coronary angiogram      FAMILY HISTORY:  Family history of depression (Mother)  FH: CAD (coronary artery disease) (Sibling, Sibling)  Family history of diabetes mellitus (DM) (Sibling)        SOCIAL HISTORY:   Significant other/partner[x ]  Children[ ]  Yarsani/Spirituality:  Substance hx:  [ ]   Tobacco hx:  [ ]   Alcohol hx: [ ]   Home Opioid hx:  [ ] I-Stop Reference No:  Living Situation: [ x]Home  [ ]Long term care  [ ]Rehab [ ]Other    ADVANCE DIRECTIVES:    DNR/MOLST  [ ]  Living Will  [ ]   DECISION MAKER(s):  [x ] Health Care Proxy(s)  [ ] Surrogate(s)  [ ] Guardian           Name(s): Phone Number(s):  Kenia Becerra (spouse) HCP form available in patient window    BASELINE (I)ADL(s) (prior to admission):  [place]: [ ]Total  [x ] Moderate [ ]Dependent    Allergies    No Known Allergies    Intolerances    atorvastatin (Muscle Pain (Severe))  MEDICATIONS  (STANDING):  aspirin enteric coated 81 milliGRAM(s) Oral daily  cefepime   IVPB 1000 milliGRAM(s) IV Intermittent every 12 hours  cefepime   IVPB      chlorhexidine 2% Cloths 1 Application(s) Topical daily  clopidogrel Tablet 75 milliGRAM(s) Oral daily  DOBUTamine Infusion 7.5 MICROgram(s)/kG/Min (8.6 mL/Hr) IV Continuous <Continuous>  melatonin 5 milliGRAM(s) Oral at bedtime  mupirocin 2% Nasal 1 Application(s) Both Nostrils two times a day  polyethylene glycol 3350 17 Gram(s) Oral daily  polyethylene glycol 3350 17 Gram(s) Oral at bedtime  senna 2 Tablet(s) Oral at bedtime    MEDICATIONS  (PRN):      ITEMS NOT CHECKED ARE NOT PRESENT  PRESENT SYMPTOMS: [ ]Unable to self-report see CPOT, PAINADs, RDOS  Source if other than patient:  [ ]Family   [ ]Team     Pain: [ ]yes [ x]no  QOL impact -   Location -                    Aggravating factors -  Quality -  Radiation -  Timing-  Severity (0-10 scale):  Minimal acceptable level (0-10 scale):       Dyspnea:                           [x ]Mild [ ]Moderate [ ]Severe  Anxiety:                             [ ]Mild [ ]Moderate [ ]Severe  Fatigue:                             [ ]Mild [ ]Moderate [ ]Severe  Nausea:                             [ ]Mild [ ]Moderate [ ]Severe  Loss of appetite:              [ ]Mild [ ]Moderate [ ]Severe  Constipation:                    [ ]Mild [ ]Moderate [ ]Severe    PCSSQ [Palliative Care Spiritual Screening Question]   Severity (0-10):  Score of 4 or > indicate consideration of Chaplaincy referral.  Chaplaincy Referral: [ ] yes [ ] refused [ ] following [ x] deferred    Caregiver [place]? : [  ] yes [x ] no [ ] deferred: Social work referral [ ] Patient & Family Centered Care Referral [ ]     Anticipatory Grief Present?: [ x] yes [ ] no  [ ] deferred: Palliative Social work referral [ ]  Patient & Family Centered Care Referral [ ]       Other Symptoms:  [x ]All other review of systems negative   [ ] Unable to obtain due to poor mentation    PHYSICAL EXAM:  Vital Signs Last 24 Hrs  T(C): 36.3 (10 Sep 2024 11:00), Max: 36.7 (10 Sep 2024 03:00)  T(F): 97.3 (10 Sep 2024 11:00), Max: 98.1 (10 Sep 2024 03:00)  HR: 97 (10 Sep 2024 12:00) (79 - 123)  BP: 100/58 (10 Sep 2024 12:00) (79/51 - 110/55)  BP(mean): 76 (10 Sep 2024 12:00) (60 - 76)  RR: 16 (10 Sep 2024 12:00) (13 - 27)  SpO2: 93% (10 Sep 2024 12:00) (92% - 96%)    Parameters below as of 10 Sep 2024 12:00  Patient On (Oxygen Delivery Method): room air     I&O's Summary    09 Sep 2024 07:01  -  10 Sep 2024 07:00  --------------------------------------------------------  IN: 1391 mL / OUT: 3525 mL / NET: -2134 mL    10 Sep 2024 07:01  -  10 Sep 2024 12:54  --------------------------------------------------------  IN: 441.1 mL / OUT: 1090 mL / NET: -648.9 mL      GENERAL:  [x]Alert  [x]Oriented x 3  [ ]Lethargic  [ ]Cachexia  [ ]Unarousable  [x]Verbal  [ ]Non-Verbal  Behavioral:   [ ]Anxiety  [ ]Delirium [ ]Agitation [ ]Other  HEENT:  [x]Normal   [ ]Dry mouth   [ ]ET Tube/Trach  [ ]Oral lesions  PULMONARY:   [x]Clear [ ]Tachypnea  [ ]Audible excessive secretions   [ ]Rhonchi        [ ]Right [ ]Left [ ]Bilateral  [ ]Crackles        [ ]Right [ ]Left [ ]Bilateral  [ ]Wheezing     [ ]Right [ ]Left [ ]Bilateral  [ ]Diminished BS [ ] Right [ ]Left [ ]Bilateral  CARDIOVASCULAR:    [x]Regular [ ]Irregular [ ]Tachy  [ ]Christiano [ ]Murmur [ ]Other  GASTROINTESTINAL:  [x]Soft  [ ]Distended   [x]+BS  [x]Non tender [ ]Tender  [ ]PEG [ ]OGT/ NGT   Last BM:    GENITOURINARY:  [x]Normal [ ]Incontinent   [ ]Oliguria/Anuria   [ ]Chandler  MUSCULOSKELETAL:   [ ]Normal   [x]Weakness  [ ]Bed/Wheelchair bound [ ]Edema  NEUROLOGIC:   [x]No focal deficits  [ ] Cognitive impairment  [ ] Dysphagia [ ]Dysarthria [ ] Paresis [ ]Other   SKIN:   [x]Normal  [ ]Rash   [ ]Pressure ulcer(s) [ ]y [ ]n present on admission    CRITICAL CARE:  [ ] Shock Present  [ ]Septic [ ]Cardiogenic [ ]Neurologic [ ]Hypovolemic  [ ]  Vasopressors [ ]  Inotropes   [ ]Respiratory failure present [ ]Mechanical ventilation [ ]Non-invasive ventilatory support [ ]High flow    [ ]Acute  [ ]Chronic [ ]Hypoxic  [ ]Hypercarbic [ ]Other  [ ]Other organ failure     LABS:                        7.4    4.99  )-----------( 64       ( 10 Sep 2024 00:32 )             23.4   09-10    130<L>  |  91<L>  |  88<H>  ----------------------------<  133<H>  3.5   |  22  |  3.83<H>    Ca    8.1<L>      10 Sep 2024 00:32  Phos  4.8     09-10  Mg     2.4     09-10    TPro  6.0  /  Alb  2.7<L>  /  TBili  2.0<H>  /  DBili  x   /  AST  58<H>  /  ALT  27  /  AlkPhos  245<H>  09-10      Urinalysis Basic - ( 10 Sep 2024 00:32 )    Color: x / Appearance: x / SG: x / pH: x  Gluc: 133 mg/dL / Ketone: x  / Bili: x / Urobili: x   Blood: x / Protein: x / Nitrite: x   Leuk Esterase: x / RBC: x / WBC x   Sq Epi: x / Non Sq Epi: x / Bacteria: x      RADIOLOGY & ADDITIONAL STUDIES:    PROTEIN CALORIE MALNUTRITION PRESENT: [ ]mild [ ]moderate [ ]severe [ ]underweight [ ]morbid obesity  https://www.andeal.org/vault/2473/web/files/ONC/Table_Clinical%20Characteristics%20to%20Document%20Malnutrition-White%20JV%20et%20al%188598.pdf    Height (cm): 182.9 (09-07-24 @ 22:22), 182.9 (09-07-24 @ 10:27), 182.9 (08-05-24 @ 09:41)  Weight (kg): 76.4 (09-07-24 @ 22:22), 74.8 (09-07-24 @ 10:27), 70.3 (08-05-24 @ 09:41)  BMI (kg/m2): 22.8 (09-07-24 @ 22:22), 22.4 (09-07-24 @ 10:27), 21 (08-05-24 @ 09:41)    [ ]PPSV2 < or = to 30% [ ]significant weight loss  [ ]poor nutritional intake  [ ]anasarca[ ]Artificial Nutrition      Other REFERRALS:  [ ]Hospice  [ ]Child Life  [ ]Social Work  [ ]Case management [ ]Holistic Therapy    Date of Service: 09-10-24 @ 12:54    HPI:  79-year-old male with a history of HFrEF on home dobutamine 7.5 with last EF less than 20%, CAD, lungs mass - SCC, and urinary retention presented transferred from [facility] for management of heart failure after having syncopal episode last night.  Patient also having reduced urine output despite 6 mg of Bumex twice a day.  At [facility] patient had a workup which was notable for sodium of 124, creatinine to 4.6 from his baseline of approximately 2-2.1, proBNP 5220, lactate 1.9.  He was transferred here for further evaluation and  heart failure management, admitted to CICU.    On admission to CICU he is A&O, saturating well on room air, sinus rhythm 75 /59. He denies dizziness, chest pain, sob, N&V on admission. (07 Sep 2024 23:06)    PERTINENT PM/SXH:   BPH (benign prostatic hyperplasia)  Bipolar disorder  Depression  Anxiety  Umbilical hernia, incarcerated  Inguinal hernia of right side without obstruction or gangrene  Constipation  Urinary retention  CAD (coronary artery disease)  SCC (squamous cell carcinoma)  Lung mass  Other nonspecific abnormal finding of lung field  LV (left ventricular) mural thrombus  Severe left ventricular systolic dysfunction  Heart failure with reduced ejection fraction    History of arthroscopy of knee  History of tonsillectomy  History of hernia repair  H/O coronary angiogram      FAMILY HISTORY:  Family history of depression (Mother)  FH: CAD (coronary artery disease) (Sibling, Sibling)  Family history of diabetes mellitus (DM) (Sibling)        SOCIAL HISTORY:   Significant other/partner[x ]  Children[ ]  Tenriism/Spirituality:  Substance hx:  [ ]   Tobacco hx:  [ ]   Alcohol hx: [ ]   Home Opioid hx:  [ ] I-Stop Reference No:  Living Situation: [ x]Home  [ ]Long term care  [ ]Rehab [ ]Other    ADVANCE DIRECTIVES:    DNR/MOLST  [ ]  Living Will  [ ]   DECISION MAKER(s):  [x ] Health Care Proxy(s)  [ ] Surrogate(s)  [ ] Guardian           Name(s): Phone Number(s):  Kenia Becerra (spouse) HCP form available in patient window    BASELINE (I)ADL(s) (prior to admission):  [place]: [ ]Total  [x ] Moderate [ ]Dependent    Allergies    No Known Allergies    Intolerances    atorvastatin (Muscle Pain (Severe))  MEDICATIONS  (STANDING):  aspirin enteric coated 81 milliGRAM(s) Oral daily  cefepime   IVPB 1000 milliGRAM(s) IV Intermittent every 12 hours  cefepime   IVPB      chlorhexidine 2% Cloths 1 Application(s) Topical daily  clopidogrel Tablet 75 milliGRAM(s) Oral daily  DOBUTamine Infusion 7.5 MICROgram(s)/kG/Min (8.6 mL/Hr) IV Continuous <Continuous>  melatonin 5 milliGRAM(s) Oral at bedtime  mupirocin 2% Nasal 1 Application(s) Both Nostrils two times a day  polyethylene glycol 3350 17 Gram(s) Oral daily  polyethylene glycol 3350 17 Gram(s) Oral at bedtime  senna 2 Tablet(s) Oral at bedtime    MEDICATIONS  (PRN):      ITEMS NOT CHECKED ARE NOT PRESENT  PRESENT SYMPTOMS: [ ]Unable to self-report see CPOT, PAINADs, RDOS  Source if other than patient:  [ ]Family   [ ]Team     Pain: [ ]yes [ x]no  QOL impact -   Location -                    Aggravating factors -  Quality -  Radiation -  Timing-  Severity (0-10 scale):  Minimal acceptable level (0-10 scale):       Dyspnea:                           [x ]Mild [ ]Moderate [ ]Severe  Anxiety:                             [ ]Mild [ ]Moderate [ ]Severe  Fatigue:                             [ ]Mild [ ]Moderate [ ]Severe  Nausea:                             [ ]Mild [ ]Moderate [ ]Severe  Loss of appetite:              [ ]Mild [ ]Moderate [ ]Severe  Constipation:                    [ ]Mild [ ]Moderate [ ]Severe    PCSSQ [Palliative Care Spiritual Screening Question]   Severity (0-10):  Score of 4 or > indicate consideration of Chaplaincy referral.  Chaplaincy Referral: [ ] yes [ ] refused [ ] following [ x] deferred    Caregiver [place]? : [  ] yes [x ] no [ ] deferred: Social work referral [ ] Patient & Family Centered Care Referral [ ]     Anticipatory Grief Present?: [ x] yes [ ] no  [ ] deferred: Palliative Social work referral [ ]  Patient & Family Centered Care Referral [ ]       Other Symptoms:  [x ]All other review of systems negative   [ ] Unable to obtain due to poor mentation    PHYSICAL EXAM:  Vital Signs Last 24 Hrs  T(C): 36.3 (10 Sep 2024 11:00), Max: 36.7 (10 Sep 2024 03:00)  T(F): 97.3 (10 Sep 2024 11:00), Max: 98.1 (10 Sep 2024 03:00)  HR: 97 (10 Sep 2024 12:00) (79 - 123)  BP: 100/58 (10 Sep 2024 12:00) (79/51 - 110/55)  BP(mean): 76 (10 Sep 2024 12:00) (60 - 76)  RR: 16 (10 Sep 2024 12:00) (13 - 27)  SpO2: 93% (10 Sep 2024 12:00) (92% - 96%)    Parameters below as of 10 Sep 2024 12:00  Patient On (Oxygen Delivery Method): room air     I&O's Summary    09 Sep 2024 07:01  -  10 Sep 2024 07:00  --------------------------------------------------------  IN: 1391 mL / OUT: 3525 mL / NET: -2134 mL    10 Sep 2024 07:01  -  10 Sep 2024 12:54  --------------------------------------------------------  IN: 441.1 mL / OUT: 1090 mL / NET: -648.9 mL      GENERAL:  [x]Alert  [x]Oriented x 3  [ ]Lethargic  [ ]Cachexia  [ ]Unarousable  [x]Verbal  [ ]Non-Verbal  Behavioral:   [ ]Anxiety  [ ]Delirium [ ]Agitation [ ]Other  HEENT:  [x]Normal   [ ]Dry mouth   [ ]ET Tube/Trach  [ ]Oral lesions  PULMONARY:   [x]Clear [ ]Tachypnea  [ ]Audible excessive secretions   [ ]Rhonchi        [ ]Right [ ]Left [ ]Bilateral  [ ]Crackles        [ ]Right [ ]Left [ ]Bilateral  [ ]Wheezing     [ ]Right [ ]Left [ ]Bilateral  [ ]Diminished BS [ ] Right [ ]Left [ ]Bilateral  CARDIOVASCULAR:    [x]Regular [ ]Irregular [ ]Tachy  [ ]Christiano [ ]Murmur [ ]Other  GASTROINTESTINAL:  [x]Soft  [ ]Distended   [x]+BS  [x]Non tender [ ]Tender  [ ]PEG [ ]OGT/ NGT   Last BM:    GENITOURINARY:  [x]Normal [ ]Incontinent   [ ]Oliguria/Anuria   [ ]Chandler  MUSCULOSKELETAL:   [ ]Normal   [x]Weakness  [ ]Bed/Wheelchair bound [ ]Edema  NEUROLOGIC:   [x]No focal deficits  [ ] Cognitive impairment  [ ] Dysphagia [ ]Dysarthria [ ] Paresis [ ]Other   SKIN:   [x]Normal  [ ]Rash   [ ]Pressure ulcer(s) [ ]y [ ]n present on admission    CRITICAL CARE:  [x ] Shock Present  [ ]Septic [x ]Cardiogenic [ ]Neurologic [ ]Hypovolemic  [ ]  Vasopressors [x ]  Inotropes   [ ]Respiratory failure present [ ]Mechanical ventilation [ ]Non-invasive ventilatory support [ ]High flow    [ ]Acute  [ ]Chronic [ ]Hypoxic  [ ]Hypercarbic [ ]Other  [ ]Other organ failure     LABS:                        7.4    4.99  )-----------( 64       ( 10 Sep 2024 00:32 )             23.4   09-10    130<L>  |  91<L>  |  88<H>  ----------------------------<  133<H>  3.5   |  22  |  3.83<H>    Ca    8.1<L>      10 Sep 2024 00:32  Phos  4.8     09-10  Mg     2.4     09-10    TPro  6.0  /  Alb  2.7<L>  /  TBili  2.0<H>  /  DBili  x   /  AST  58<H>  /  ALT  27  /  AlkPhos  245<H>  09-10      Urinalysis Basic - ( 10 Sep 2024 00:32 )    Color: x / Appearance: x / SG: x / pH: x  Gluc: 133 mg/dL / Ketone: x  / Bili: x / Urobili: x   Blood: x / Protein: x / Nitrite: x   Leuk Esterase: x / RBC: x / WBC x   Sq Epi: x / Non Sq Epi: x / Bacteria: x      RADIOLOGY & ADDITIONAL STUDIES:  < from: TTE W or WO Ultrasound Enhancing Agent (09.08.24 @ 07:21) >    CONCLUSIONS:      1. The interventricular septum is flattened in systole and diastole consistent with right ventricular pressure and volume overload. Left ventricular systolic function is severely decreased with an ejection fraction visually estimated at 20 to 25 %. Regional wall motion abnormalities present.   2. Multiple segmental abnormalities exist. See findings.   3. Severely enlarged right ventricular cavity size, with normal wall thickness, and moderately reduced right ventricular systolic function.   4. The right atrium is severely dilated.   5. Moderate mitral regurgitation.   6. Estimated pulmonary artery systolic pressure is 49 mmHg, consistent with moderate pulmonary hypertension.   7. No prior echocardiogram is available for comparison.   8. Right pleural effusion noted.   9. The inferior vena cava is dilated measuring 2.20 cm in diameter, (dilated >2.1cm) with abnormal inspiratory collapse (abnormal <50%) consistent with elevated right atrial pressure (~15, range 10-20mmHg).  10. There is increased LV mass and eccentric hypertrophy.  11. There is no evidence of a left ventricular thrombus.    < end of copied text >    PROTEIN CALORIE MALNUTRITION PRESENT: [ ]mild [ ]moderate [ ]severe [ ]underweight [ ]morbid obesity  https://www.andeal.org/vault/2440/web/files/ONC/Table_Clinical%20Characteristics%20to%20Document%20Malnutrition-White%20JV%20et%20al%998769.pdf    Height (cm): 182.9 (09-07-24 @ 22:22), 182.9 (09-07-24 @ 10:27), 182.9 (08-05-24 @ 09:41)  Weight (kg): 76.4 (09-07-24 @ 22:22), 74.8 (09-07-24 @ 10:27), 70.3 (08-05-24 @ 09:41)  BMI (kg/m2): 22.8 (09-07-24 @ 22:22), 22.4 (09-07-24 @ 10:27), 21 (08-05-24 @ 09:41)    [ ]PPSV2 < or = to 30% [ ]significant weight loss  [ ]poor nutritional intake  [ ]anasarca[ ]Artificial Nutrition      Other REFERRALS:  [ ]Hospice  [ ]Child Life  [ ]Social Work  [ ]Case management [ ]Holistic Therapy    Date of Service: 09-10-24 @ 12:54    HPI:  79-year-old male with a history of HFrEF on home dobutamine 7.5 with last EF less than 20%, CAD, lungs mass - SCC, and urinary retention presented transferred from [facility] for management of heart failure after having syncopal episode last night.  Patient also having reduced urine output despite 6 mg of Bumex twice a day.  At [facility] patient had a workup which was notable for sodium of 124, creatinine to 4.6 from his baseline of approximately 2-2.1, proBNP 5220, lactate 1.9.  He was transferred here for further evaluation and  heart failure management, admitted to CICU.    On admission to CICU he is A&O, saturating well on room air, sinus rhythm 75 /59. He denies dizziness, chest pain, sob, N&V on admission. (07 Sep 2024 23:06)    PERTINENT PM/SXH:   BPH (benign prostatic hyperplasia)  Bipolar disorder  Depression  Anxiety  Umbilical hernia, incarcerated  Inguinal hernia of right side without obstruction or gangrene  Constipation  Urinary retention  CAD (coronary artery disease)  SCC (squamous cell carcinoma)  Lung mass  Other nonspecific abnormal finding of lung field  LV (left ventricular) mural thrombus  Severe left ventricular systolic dysfunction  Heart failure with reduced ejection fraction    History of arthroscopy of knee  History of tonsillectomy  History of hernia repair  H/O coronary angiogram      FAMILY HISTORY:  Family history of depression (Mother)  FH: CAD (coronary artery disease) (Sibling, Sibling)  Family history of diabetes mellitus (DM) (Sibling)    SOCIAL HISTORY:   Significant other/partner[x ]  Children[ ]  Judaism/Spirituality:  Substance hx:  [ ]   Tobacco hx:  [ ]   Alcohol hx: [ ]   Home Opioid hx:  [ ] I-Stop Reference No:  Living Situation: [ x]Home  [ ]Long term care  [ ]Rehab [ ]Other    ADVANCE DIRECTIVES:    DNR/MOLST  [ ]  Living Will  [ ]   DECISION MAKER(s):  [x ] Health Care Proxy(s)  [ ] Surrogate(s)  [ ] Guardian           Name(s): Phone Number(s):  Kenia Becerra (spouse) HCP form available in patient window    BASELINE (I)ADL(s) (prior to admission):  [place]: [ ]Total  [x ] Moderate [ ]Dependent    Allergies    No Known Allergies    Intolerances    atorvastatin (Muscle Pain (Severe))  MEDICATIONS  (STANDING):  aspirin enteric coated 81 milliGRAM(s) Oral daily  cefepime   IVPB 1000 milliGRAM(s) IV Intermittent every 12 hours  cefepime   IVPB      chlorhexidine 2% Cloths 1 Application(s) Topical daily  clopidogrel Tablet 75 milliGRAM(s) Oral daily  DOBUTamine Infusion 7.5 MICROgram(s)/kG/Min (8.6 mL/Hr) IV Continuous <Continuous>  melatonin 5 milliGRAM(s) Oral at bedtime  mupirocin 2% Nasal 1 Application(s) Both Nostrils two times a day  polyethylene glycol 3350 17 Gram(s) Oral daily  polyethylene glycol 3350 17 Gram(s) Oral at bedtime  senna 2 Tablet(s) Oral at bedtime    MEDICATIONS  (PRN):      ITEMS NOT CHECKED ARE NOT PRESENT  PRESENT SYMPTOMS: [ ]Unable to self-report see CPOT, PAINADs, RDOS  Source if other than patient:  [ ]Family   [ ]Team     Pain: [ ]yes [ x]no  QOL impact -   Location -                    Aggravating factors -  Quality -  Radiation -  Timing-  Severity (0-10 scale):  Minimal acceptable level (0-10 scale):       Dyspnea:                           [x ]Mild [ ]Moderate [ ]Severe  Anxiety:                             [ ]Mild [ ]Moderate [ ]Severe  Fatigue:                             [ ]Mild [ ]Moderate [ ]Severe  Nausea:                             [ ]Mild [ ]Moderate [ ]Severe  Loss of appetite:              [ ]Mild [ ]Moderate [ ]Severe  Constipation:                    [ ]Mild [ ]Moderate [ ]Severe    PCSSQ [Palliative Care Spiritual Screening Question]   Severity (0-10):  Score of 4 or > indicate consideration of Chaplaincy referral.  Chaplaincy Referral: [ ] yes [ ] refused [ ] following [ x] deferred    Caregiver [place]? : [  ] yes [x ] no [ ] deferred: Social work referral [ ] Patient & Family Centered Care Referral [ ]     Anticipatory Grief Present?: [ x] yes [ ] no  [ ] deferred: Palliative Social work referral [ ]  Patient & Family Centered Care Referral [ ]       Other Symptoms:  [x ]All other review of systems negative   [ ] Unable to obtain due to poor mentation    PHYSICAL EXAM:  Vital Signs Last 24 Hrs  T(C): 36.3 (10 Sep 2024 11:00), Max: 36.7 (10 Sep 2024 03:00)  T(F): 97.3 (10 Sep 2024 11:00), Max: 98.1 (10 Sep 2024 03:00)  HR: 97 (10 Sep 2024 12:00) (79 - 123)  BP: 100/58 (10 Sep 2024 12:00) (79/51 - 110/55)  BP(mean): 76 (10 Sep 2024 12:00) (60 - 76)  RR: 16 (10 Sep 2024 12:00) (13 - 27)  SpO2: 93% (10 Sep 2024 12:00) (92% - 96%)    Parameters below as of 10 Sep 2024 12:00  Patient On (Oxygen Delivery Method): room air     I&O's Summary    09 Sep 2024 07:01  -  10 Sep 2024 07:00  --------------------------------------------------------  IN: 1391 mL / OUT: 3525 mL / NET: -2134 mL    10 Sep 2024 07:01  -  10 Sep 2024 12:54  --------------------------------------------------------  IN: 441.1 mL / OUT: 1090 mL / NET: -648.9 mL      GENERAL:  [x]Alert  [x]Oriented x 3  [ ]Lethargic  [ ]Cachexia  [ ]Unarousable  [x]Verbal  [ ]Non-Verbal  Behavioral:   [ ]Anxiety  [ ]Delirium [ ]Agitation [ ]Other  HEENT:  [x]Normal   [ ]Dry mouth   [ ]ET Tube/Trach  [ ]Oral lesions  PULMONARY:   [x]Clear [ ]Tachypnea  [ ]Audible excessive secretions   [ ]Rhonchi        [ ]Right [ ]Left [ ]Bilateral  [ ]Crackles        [ ]Right [ ]Left [ ]Bilateral  [ ]Wheezing     [ ]Right [ ]Left [ ]Bilateral  [ ]Diminished BS [ ] Right [ ]Left [ ]Bilateral  CARDIOVASCULAR:    [x]Regular [ ]Irregular [ ]Tachy  [ ]Christiano [ ]Murmur [ ]Other  GASTROINTESTINAL:  [x]Soft  [ ]Distended   [x]+BS  [x]Non tender [ ]Tender  [ ]PEG [ ]OGT/ NGT   Last BM:    GENITOURINARY:  [x]Normal [ ]Incontinent   [ ]Oliguria/Anuria   [ ]Chandler  MUSCULOSKELETAL:   [ ]Normal   [x]Weakness  [ ]Bed/Wheelchair bound [ ]Edema  NEUROLOGIC:   [x]No focal deficits  [ ] Cognitive impairment  [ ] Dysphagia [ ]Dysarthria [ ] Paresis [ ]Other   SKIN:   [x]Normal  [ ]Rash   [ ]Pressure ulcer(s) [ ]y [ ]n present on admission    CRITICAL CARE:  [x ] Shock Present  [ ]Septic [x ]Cardiogenic [ ]Neurologic [ ]Hypovolemic  [ ]  Vasopressors [x ]  Inotropes   [ ]Respiratory failure present [ ]Mechanical ventilation [ ]Non-invasive ventilatory support [ ]High flow    [ ]Acute  [ ]Chronic [ ]Hypoxic  [ ]Hypercarbic [ ]Other  [ ]Other organ failure     LABS:                        7.4    4.99  )-----------( 64       ( 10 Sep 2024 00:32 )             23.4   09-10    130<L>  |  91<L>  |  88<H>  ----------------------------<  133<H>  3.5   |  22  |  3.83<H>    Ca    8.1<L>      10 Sep 2024 00:32  Phos  4.8     09-10  Mg     2.4     09-10    TPro  6.0  /  Alb  2.7<L>  /  TBili  2.0<H>  /  DBili  x   /  AST  58<H>  /  ALT  27  /  AlkPhos  245<H>  09-10      Urinalysis Basic - ( 10 Sep 2024 00:32 )    Color: x / Appearance: x / SG: x / pH: x  Gluc: 133 mg/dL / Ketone: x  / Bili: x / Urobili: x   Blood: x / Protein: x / Nitrite: x   Leuk Esterase: x / RBC: x / WBC x   Sq Epi: x / Non Sq Epi: x / Bacteria: x      RADIOLOGY & ADDITIONAL STUDIES:  < from: TTE W or WO Ultrasound Enhancing Agent (09.08.24 @ 07:21) >    CONCLUSIONS:      1. The interventricular septum is flattened in systole and diastole consistent with right ventricular pressure and volume overload. Left ventricular systolic function is severely decreased with an ejection fraction visually estimated at 20 to 25 %. Regional wall motion abnormalities present.   2. Multiple segmental abnormalities exist. See findings.   3. Severely enlarged right ventricular cavity size, with normal wall thickness, and moderately reduced right ventricular systolic function.   4. The right atrium is severely dilated.   5. Moderate mitral regurgitation.   6. Estimated pulmonary artery systolic pressure is 49 mmHg, consistent with moderate pulmonary hypertension.   7. No prior echocardiogram is available for comparison.   8. Right pleural effusion noted.   9. The inferior vena cava is dilated measuring 2.20 cm in diameter, (dilated >2.1cm) with abnormal inspiratory collapse (abnormal <50%) consistent with elevated right atrial pressure (~15, range 10-20mmHg).  10. There is increased LV mass and eccentric hypertrophy.  11. There is no evidence of a left ventricular thrombus.    < end of copied text >    PROTEIN CALORIE MALNUTRITION PRESENT: [ ]mild [ ]moderate [ ]severe [ ]underweight [ ]morbid obesity  https://www.andeal.org/vault/2440/web/files/ONC/Table_Clinical%20Characteristics%20to%20Document%20Malnutrition-White%20JV%20et%[email or web address].pdf    Height (cm): 182.9 (09-07-24 @ 22:22), 182.9 (09-07-24 @ 10:27), 182.9 (08-05-24 @ 09:41)  Weight (kg): 76.4 (09-07-24 @ 22:22), 74.8 (09-07-24 @ 10:27), 70.3 (08-05-24 @ 09:41)  BMI (kg/m2): 22.8 (09-07-24 @ 22:22), 22.4 (09-07-24 @ 10:27), 21 (08-05-24 @ 09:41)    [ ]PPSV2 < or = to 30% [ ]significant weight loss  [ ]poor nutritional intake  [ ]anasarca[ ]Artificial Nutrition      Other REFERRALS:  [ ]Hospice  [ ]Child Life  [ ]Social Work  [ ]Case management [ ]Holistic Therapy

## 2024-09-10 NOTE — PROGRESS NOTE ADULT - SUBJECTIVE AND OBJECTIVE BOX
PATIENT:  ELYSE MALAVE  [id number]    CHIEF COMPLAINT:  Patient is a 79y old  Male who presents with a chief complaint of cardiogenic shock (10 Sep 2024 18:40)      INTERVAL HISTORYOVERNIGHT EVENTS:      REVIEW OF SYSTEMS:    Constitutional:     [ ] negative [ ] fevers [ ] chills [ ] weight loss [ ] weight gain  HEENT:                  [ ] negative [ ] dry eyes [ ] eye irritation [ ] postnasal drip [ ] nasal congestion  CV:                         [ ] negative  [ ] chest pain [ ] orthopnea [ ] palpitations [ ] murmur  Resp:                     [ ] negative [ ] cough [ ] shortness of breath [ ] dyspnea [ ] wheezing [ ] sputum [ ] hemoptysis  GI:                          [ ] negative [ ] nausea [ ] vomiting [ ] diarrhea [ ] constipation [ ] abd pain [ ] dysphagia   :                        [ ] negative [ ] dysuria [ ] nocturia [ ] hematuria [ ] increased urinary frequency  Musculoskeletal: [ ] negative [ ] back pain [ ] myalgias [ ] arthralgias [ ] fracture  Skin:                       [ ] negative [ ] rash [ ] itch  Neurological:        [ ] negative [ ] headache [ ] dizziness [ ] syncope [ ] weakness [ ] numbness  Psychiatric:           [ ] negative [ ] anxiety [ ] depression  Endocrine:            [ ] negative [ ] diabetes [ ] thyroid problem  Heme/Lymph:      [ ] negative [ ] anemia [ ] bleeding problem  Allergic/Immune: [ ] negative [ ] itchy eyes [ ] nasal discharge [ ] hives [ ] angioedema    [ ] All other systems negative  [ ] Unable to assess ROS because ________.    MEDICATIONS:  MEDICATIONS  (STANDING):  aspirin enteric coated 81 milliGRAM(s) Oral daily  cefepime   IVPB 1000 milliGRAM(s) IV Intermittent every 12 hours  cefepime   IVPB      chlorhexidine 2% Cloths 1 Application(s) Topical daily  clopidogrel Tablet 75 milliGRAM(s) Oral daily  DOBUTamine Infusion 10 MICROgram(s)/kG/Min (11.5 mL/Hr) IV Continuous <Continuous>  melatonin 5 milliGRAM(s) Oral at bedtime  mupirocin 2% Nasal 1 Application(s) Both Nostrils two times a day  polyethylene glycol 3350 17 Gram(s) Oral daily  senna 2 Tablet(s) Oral at bedtime    MEDICATIONS  (PRN):      ALLERGIES:  Allergies    No Known Allergies    Intolerances    atorvastatin (Muscle Pain (Severe))      OBJECTIVE:  ICU Vital Signs Last 24 Hrs  T(C): 36.6 (10 Sep 2024 19:00), Max: 36.7 (10 Sep 2024 03:00)  T(F): 97.9 (10 Sep 2024 19:00), Max: 98.1 (10 Sep 2024 03:00)  HR: 86 (10 Sep 2024 20:00) (79 - 123)  BP: 93/50 (10 Sep 2024 20:00) (79/51 - 103/59)  BP(mean): 64 (10 Sep 2024 20:00) (60 - 76)  ABP: --  ABP(mean): --  RR: 14 (10 Sep 2024 20:00) (12 - 27)  SpO2: 95% (10 Sep 2024 20:00) (92% - 96%)    O2 Parameters below as of 10 Sep 2024 20:00  Patient On (Oxygen Delivery Method): room air            Adult Advanced Hemodynamics Last 24 Hrs  CVP(mm Hg): 15 (10 Sep 2024 20:00) (3 - 15)  CVP(cm H2O): --  CO: --  CI: --  PA: --  PA(mean): --  PCWP: --  SVR: --  SVRI: --  PVR: --  PVRI: --  CAPILLARY BLOOD GLUCOSE        CAPILLARY BLOOD GLUCOSE        I&O's Summary    09 Sep 2024 07:01  -  10 Sep 2024 07:00  --------------------------------------------------------  IN: 1391 mL / OUT: 3525 mL / NET: -2134 mL    10 Sep 2024 07:01  -  10 Sep 2024 20:52  --------------------------------------------------------  IN: 853.7 mL / OUT: 2520 mL / NET: -1666.3 mL      Daily     Daily     PHYSICAL EXAMINATION:  General: WN/WD NAD  HEENT: PERRLA, EOMI, moist mucous membranes  Neurology: A&Ox3, nonfocal, GARCIA x 4  Respiratory: CTA B/L, normal respiratory effort, no wheezes, crackles, rales  CV: RRR, S1S2, no murmurs, rubs or gallops  Abdominal: Soft, NT, ND +BS, Last BM  Extremities: No edema, + peripheral pulses  Incisions:   Tubes:    LABS:                          7.9    4.74  )-----------( 65       ( 10 Sep 2024 15:50 )             24.5     09-10    134<L>  |  92<L>  |  87<H>  ----------------------------<  122<H>  3.4<L>   |  24  |  4.15<H>    Ca    8.5      10 Sep 2024 15:50  Phos  4.8     09-10  Mg     2.4     09-10    TPro  6.0  /  Alb  2.7<L>  /  TBili  2.0<H>  /  DBili  x   /  AST  58<H>  /  ALT  27  /  AlkPhos  245<H>  09-10    LIVER FUNCTIONS - ( 10 Sep 2024 00:32 )  Alb: 2.7 g/dL / Pro: 6.0 g/dL / ALK PHOS: 245 U/L / ALT: 27 U/L / AST: 58 U/L / GGT: x                   Urinalysis Basic - ( 10 Sep 2024 15:50 )    Color: x / Appearance: x / SG: x / pH: x  Gluc: 122 mg/dL / Ketone: x  / Bili: x / Urobili: x   Blood: x / Protein: x / Nitrite: x   Leuk Esterase: x / RBC: x / WBC x   Sq Epi: x / Non Sq Epi: x / Bacteria: x        TELEMETRY:     EKG:     IMAGING:       PATIENT:  ELYSE MALAVE  [id number]    CHIEF COMPLAINT:  Patient is a 79y old  Male who presents with a chief complaint of cardiogenic shock (10 Sep 2024 18:40)    INTERVAL HISTORY: no acute events    REVIEW OF SYSTEMS:  Constitutional:     [x ] negative [ ] fevers [ ] chills [ ] weight loss [ ] weight gain  HEENT:                  [x ] negative [ ] dry eyes [ ] eye irritation [ ] postnasal drip [ ] nasal congestion  CV:                         [x ] negative  [ ] chest pain [ ] orthopnea [ ] palpitations [ ] murmur  Resp:                     [x ] negative [ ] cough [ ] shortness of breath [ ] dyspnea [ ] wheezing [ ] sputum [ ] hemoptysis  GI:                          [x ] negative [ ] nausea [ ] vomiting [ ] diarrhea [ ] constipation [ ] abd pain [ ] dysphagia   :                        [x ] negative [ ] dysuria [ ] nocturia [ ] hematuria [ ] increased urinary frequency  Musculoskeletal: [x ] negative [ ] back pain [ ] myalgias [ ] arthralgias [ ] fracture  Skin:                       [x ] negative [ ] rash [ ] itch  Neurological:        [ x] negative [ ] headache [ ] dizziness [ ] syncope [ ] weakness [ ] numbness    MEDICATIONS:  MEDICATIONS  (STANDING):  aspirin enteric coated 81 milliGRAM(s) Oral daily  cefepime   IVPB 1000 milliGRAM(s) IV Intermittent every 12 hours  cefepime   IVPB      chlorhexidine 2% Cloths 1 Application(s) Topical daily  clopidogrel Tablet 75 milliGRAM(s) Oral daily  DOBUTamine Infusion 10 MICROgram(s)/kG/Min (11.5 mL/Hr) IV Continuous <Continuous>  melatonin 5 milliGRAM(s) Oral at bedtime  mupirocin 2% Nasal 1 Application(s) Both Nostrils two times a day  polyethylene glycol 3350 17 Gram(s) Oral daily  senna 2 Tablet(s) Oral at bedtime    MEDICATIONS  (PRN):    ALLERGIES:  Allergies    No Known Allergies    Intolerances    atorvastatin (Muscle Pain (Severe))    OBJECTIVE:  ICU Vital Signs Last 24 Hrs  T(C): 36.6 (10 Sep 2024 19:00), Max: 36.7 (10 Sep 2024 03:00)  T(F): 97.9 (10 Sep 2024 19:00), Max: 98.1 (10 Sep 2024 03:00)  HR: 86 (10 Sep 2024 20:00) (79 - 123)  BP: 93/50 (10 Sep 2024 20:00) (79/51 - 103/59)  BP(mean): 64 (10 Sep 2024 20:00) (60 - 76)  ABP: --  ABP(mean): --  RR: 14 (10 Sep 2024 20:00) (12 - 27)  SpO2: 95% (10 Sep 2024 20:00) (92% - 96%)    O2 Parameters below as of 10 Sep 2024 20:00  Patient On (Oxygen Delivery Method): room air      Adult Advanced Hemodynamics Last 24 Hrs  CVP(mm Hg): 15 (10 Sep 2024 20:00) (3 - 15)  CVP(cm H2O): --  CO: --  CI: --  PA: --  PA(mean): --  PCWP: --  SVR: --  SVRI: --  PVR: --  PVRI: --  CAPILLARY BLOOD GLUCOSE    CAPILLARY BLOOD GLUCOSE    I&O's Summary    09 Sep 2024 07:01  -  10 Sep 2024 07:00  --------------------------------------------------------  IN: 1391 mL / OUT: 3525 mL / NET: -2134 mL    10 Sep 2024 07:01  -  10 Sep 2024 20:52  --------------------------------------------------------  IN: 853.7 mL / OUT: 2520 mL / NET: -1666.3 mL    Daily     Daily     PHYSICAL EXAMINATION:  General: WN/WD NAD  HEENT: PERRLA, EOMI, moist mucous membranes  Neurology: A&Ox3, nonfocal, GARCIA x 4  Respiratory: CTA B/L, normal respiratory effort, no wheezes, crackles, rales  CV: RRR, S1S2, no murmurs, rubs or gallops  Abdominal: Soft, NT, ND +BS, Last BM  Extremities: No edema, + peripheral pulses  Skin: ecchymosis  Lines: RIEMMANUEL cordis c/d/i    LABS:                     7.9    4.74  )-----------( 65       ( 10 Sep 2024 15:50 )             24.5     09-10    134<L>  |  92<L>  |  87<H>  ----------------------------<  122<H>  3.4<L>   |  24  |  4.15<H>    Ca    8.5      10 Sep 2024 15:50  Phos  4.8     09-10  Mg     2.4     09-10    TPro  6.0  /  Alb  2.7<L>  /  TBili  2.0<H>  /  DBili  x   /  AST  58<H>  /  ALT  27  /  AlkPhos  245<H>  09-10    LIVER FUNCTIONS - ( 10 Sep 2024 00:32 )  Alb: 2.7 g/dL / Pro: 6.0 g/dL / ALK PHOS: 245 U/L / ALT: 27 U/L / AST: 58 U/L / GGT: x           Urinalysis Basic - ( 10 Sep 2024 15:50 )    Color: x / Appearance: x / SG: x / pH: x  Gluc: 122 mg/dL / Ketone: x  / Bili: x / Urobili: x   Blood: x / Protein: x / Nitrite: x   Leuk Esterase: x / RBC: x / WBC x   Sq Epi: x / Non Sq Epi: x / Bacteria: x    TELEMETRY:     EKG:     IMAGING:  e< from: TTE W or WO Ultrasound Enhancing Agent (09.08.24 @ 07:21) >  CONCLUSIONS:      1. The interventricular septum is flattened in systole and diastole consistent with right ventricular pressure and volume overload. Left ventricular systolic function is severely decreased with an ejection fraction visually estimated at 20 to 25 %. Regional wall motion abnormalities present.   2. Multiple segmental abnormalities exist. See findings.   3. Severely enlarged right ventricular cavity size, with normal wall thickness, and moderately reduced right ventricular systolic function.   4. The right atrium is severely dilated.   5. Moderate mitral regurgitation.   6. Estimated pulmonary artery systolic pressure is 49 mmHg, consistent with moderate pulmonary hypertension.   7. No prior echocardiogram is available for comparison.   8. Right pleural effusion noted.   9. The inferior vena cava is dilated measuring 2.20 cm in diameter, (dilated >2.1cm) with abnormal inspiratory collapse (abnormal <50%) consistent with elevated right atrial pressure (~15, range 10-20mmHg).  10. There is increased LV mass and eccentric hypertrophy.  11. There is no evidence of a left ventricular thrombus.      < end of copied text >

## 2024-09-10 NOTE — CONSULT NOTE ADULT - CONVERSATION DETAILS
Teams introduced and roles in patient's care explained. Patient's wife/HCP present at bedside. Dr. Mohamud provided medical update and explained that patient's end stage heart failure continues to progress despite patient being on dobutamine at home, as patient presented to hospital in cardiogenic shock. He explained that unfortunately patient is not a candidate for advanced therapies and there are no further interventions, which can improve the function of his heart. Team expressed concern as patient has had several hospitalizations over the last 6 months and that it is expected for patient to continue to have frequent readmissions given his guarded prognosis.     Explore what matters most to patient, knowing his time is limited. Upon inquiry, patient unable to demonstrate insight into his advanced illness process. Patient's wife has good understanding of patient's condition and shared that while she understands patient's time is limited, she wants to give him every chance to improve. Validation and support provided. She did express that if it is patient's "time" then she wants him to pass peacefully. She also expressed wanting to get him home.    Educated family on the philosophy of hospice care. Discussed the services provided under hospice services emphasizing the goal of elevating patient's quality of life and optimizing symptom management and avoiding unnecessary future hospitalizations. Wife appreciative of information and agreeable to ongoing discussions about transitioning care pending clinical course.    Code status addressed as patient revoked DNR/DNI this admission. Patient unable to explained reasoning for revoking. Role of CPR and intubation in patient's care explained by team and recommendations for DNR/DNI made given end stage heart failure and concern for causing more harm than benefit with these interventions. Patient able to verbalize agreement with DNR/DNI and patient's wife/HCP also in agreement. New MOLST completed.    All questions answered and support provided.

## 2024-09-10 NOTE — PROGRESS NOTE ADULT - ATTENDING COMMENTS
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 at 7.5  Admitted with worsening cardiogenic shock  A+Ox3  Cardiogenic shock requiring high-dose Dobutamine - attempt to wean to outpatient dose  O2 sats mid 90s on nasal cannula - wean to room air  DASH diet  Non-oliguric BRODY, volume status has improved - decrease diuresis to target 500 cc overall negative  H/H low s/p PRBC transfusion; unclear source - check post transfusion CBC  Holding pharmacologic DVT prophylaxis given thrombocytopenia   Afebrile, on empiric Cefepime for concern for pneumonia - follow up cultures  Sugars controlled  JENNIE Bowling 9/7  Poor prognosis - recently was DNR but now wants to be full code.

## 2024-09-10 NOTE — CONSULT NOTE ADULT - PALLIATIVE PERFORMANCE SCALE: SCORE
Patient Quality Outreach    Patient is due for the following:       Topic Date Due    COVID-19 Vaccine (3 - Pediatric Pfizer series) 07/08/2024    Haemophilus influenzae B (HIB) Vaccine (4 of 4 - Standard series) 07/20/2024    Hepatitis A Vaccine (1 of 2 - 2-dose series) 07/20/2024       Next Steps:   Patient has upcoming appointment, these items will be addressed at that time.    Type of outreach:    Chart review performed, no outreach needed.      Questions for provider review:    None           Vibha Bobby, ESA         40%

## 2024-09-10 NOTE — CONSULT NOTE ADULT - ASSESSMENT
79-year-old male with a history of HFrEF on home dobutamine 7.5 with last EF less than 20%, CAD, lungs mass - SCC, and urinary retention presented transferred from clinical hospital for management failure team after having syncopal episode last night, admitted for cardiogenic shock. Pt not a candidate for advanced therapies. Pt with multiple hospitalizations this year in setting of advanced heart failure. Palliative team consulted for complex decision making in setting of advanced illness.

## 2024-09-10 NOTE — CONSULT NOTE ADULT - TIME BILLING
Total Time Spent 75 minutes.    This includes chart review, patient assessment, discussion and collaboration with interdisciplinary team members, excluding ACP.    COUNSELING:  Face to face meeting to discuss Advanced Care Planning- Time Spent 46 minutes

## 2024-09-10 NOTE — CONSULT NOTE ADULT - ASSESSMENT
**********INCOMPLETE NOTE*******************  **********INCOMPLETE NOTE*******************    79-year-old male with a history of HFrEF on home dobutamine 7.5 with last EF less than 20%, CAD, lungs mass - SCC, and urinary retention presented transferred from clinical hospital for management failure team after having syncopal episode last night, admitted for cardiogenic shock.    # Cardiogenic Shock   # BRODY on CKD (cardiorenal Syndrome)  # Chronic Anemia   # Hx of 3VD  - warm and well perfused; euvolemic on exam3  - Hgb 7.4 --> plan for 1U pRBCS transfusion  - Cr: 3.42 --> 3.83 (baseline 2.4)  - CO/CI: 5.6 / 2.8  - wean down dobutamine to 7.5  - c/w ASA, plavix and statin   - consider cefepime d/c   - GDMT on hold   - get iron studies prior to transfusion   - strict I&O, trend renal function daily  - not candidate for advance HF therapies     **********INCOMPLETE NOTE*******************  **********INCOMPLETE NOTE*******************   **********INCOMPLETE NOTE*******************  **********INCOMPLETE NOTE*******************    79-year-old male with a history of HFrEF on home dobutamine 7.5 with last EF less than 20%, CAD, lungs mass - SCC, and urinary retention presented transferred from clinical hospital for management failure team after having syncopal episode last night, admitted for cardiogenic shock.    # Cardiogenic Shock in the setting of End stage HF on home dobutamine@7.5  # BRODY on CKD (cardiorenal Syndrome)  # Chronic Anemia   # Hx of 3VD  - warm and well perfused; euvolemic on exam3  - Hgb 7.4 --> plan for 1U pRBCS transfusion  - Cr: 3.42 --> 3.83 (baseline 2.4)  - CO/CI: 5.6 / 2.8  - pt received IV Bumex 2mg yesterday --> diuretics on hold   - wean down dobutamine to 7.5  - c/w ASA, Plavix and statin   - consider cefepime d/c   - GDMT on hold   - get iron studies prior to transfusion   - strict I&O, trend renal function daily  - not candidate for advance HF therapies     **********INCOMPLETE NOTE*******************  **********INCOMPLETE NOTE******************* 79-year-old male with a history of HFrEF on home dobutamine 7.5 with last EF less than 20%, CAD, lungs mass - SCC, and urinary retention presented transferred from clinical hospital for management failure team after having syncopal episode last night, admitted for cardiogenic shock.    # Cardiogenic Shock in the setting of End stage HF on home dobutamine@7.5  # BRODY on CKD (cardiorenal Syndrome)  # Chronic Anemia   # Hx of 3VD  - warm and well perfused; euvolemic on exam3  - Hgb 7.4 --> plan for 1U pRBCS transfusion  - Cr: 3.42 --> 3.83 (baseline 2.4)  - CO/CI: 5.6 / 2.8  - pt received IV Bumex 2mg yesterday --> diuretics on hold   - wean down dobutamine to 7.5  - c/w ASA, Plavix and statin   - consider cefepime d/c   - GDMT on hold   - get iron studies prior to transfusion   - strict I&O, trend renal function daily  - not candidate for advance HF therapies

## 2024-09-10 NOTE — PROGRESS NOTE ADULT - SUBJECTIVE AND OBJECTIVE BOX
INTERVAL HPI/OVERNIGHT EVENTS:    SUBJECTIVE: Patient seen and examined at bedside.     ROS: All negative except as listed above.    VITAL SIGNS:  ICU Vital Signs Last 24 Hrs  T(C): 36.7 (10 Sep 2024 07:00), Max: 36.7 (10 Sep 2024 03:00)  T(F): 98.1 (10 Sep 2024 07:00), Max: 98.1 (10 Sep 2024 03:00)  HR: 81 (10 Sep 2024 07:00) (79 - 91)  BP: 87/52 (10 Sep 2024 07:00) (81/53 - 110/55)  BP(mean): 64 (10 Sep 2024 07:00) (62 - 76)  ABP: --  ABP(mean): --  RR: 14 (10 Sep 2024 07:00) (13 - 25)  SpO2: 95% (10 Sep 2024 07:00) (92% - 96%)    O2 Parameters below as of 10 Sep 2024 07:00  Patient On (Oxygen Delivery Method): room air            Plateau pressure:   P/F ratio:     09-09 @ 07:01  -  09-10 @ 07:00  --------------------------------------------------------  IN: 1391 mL / OUT: 3525 mL / NET: -2134 mL    09-10 @ 07:01  -  09-10 @ 07:37  --------------------------------------------------------  IN: 16.5 mL / OUT: 275 mL / NET: -258.5 mL      CAPILLARY BLOOD GLUCOSE      POCT Blood Glucose.: 108 mg/dL (08 Sep 2024 13:12)      ECG: reviewed.    PHYSICAL EXAM:    GENERAL: NAD, lying in bed comfortably  HEAD:  Atraumatic, normocephalic  EYES: EOMI, PERRLA, conjunctiva and sclera clear  NECK: Supple, trachea midline, no JVD  HEART: Regular rate and rhythm, no murmurs, rubs, or gallops  LUNGS: Unlabored respirations.  Clear to auscultation bilaterally, no crackles, wheezing, or rhonchi  ABDOMEN: Soft, nontender, nondistended, +BS  EXTREMITIES: 2+ peripheral pulses bilaterally, cap refill<2 secs. No clubbing, cyanosis, or edema  NERVOUS SYSTEM:  A&Ox3, following commands, moving all extremities, no focal deficits   SKIN: No rashes or lesions    MEDICATIONS:  MEDICATIONS  (STANDING):  aspirin enteric coated 81 milliGRAM(s) Oral daily  cefepime   IVPB 1000 milliGRAM(s) IV Intermittent every 12 hours  cefepime   IVPB      chlorhexidine 2% Cloths 1 Application(s) Topical daily  clopidogrel Tablet 75 milliGRAM(s) Oral daily  DOBUTamine Infusion 10 MICROgram(s)/kG/Min (12.2 mL/Hr) IV Continuous <Continuous>  melatonin 5 milliGRAM(s) Oral at bedtime  mupirocin 2% Nasal 1 Application(s) Both Nostrils two times a day    MEDICATIONS  (PRN):      ALLERGIES:  Allergies    No Known Allergies    Intolerances    atorvastatin (Muscle Pain (Severe))      LABS:                        7.4    4.99  )-----------( 64       ( 10 Sep 2024 00:32 )             23.4     09-10    130<L>  |  91<L>  |  88<H>  ----------------------------<  133<H>  3.5   |  22  |  3.83<H>    Ca    8.1<L>      10 Sep 2024 00:32  Phos  4.8     09-10  Mg     2.4     09-10    TPro  6.0  /  Alb  2.7<L>  /  TBili  2.0<H>  /  DBili  x   /  AST  58<H>  /  ALT  27  /  AlkPhos  245<H>  09-10      Urinalysis Basic - ( 10 Sep 2024 00:32 )    Color: x / Appearance: x / SG: x / pH: x  Gluc: 133 mg/dL / Ketone: x  / Bili: x / Urobili: x   Blood: x / Protein: x / Nitrite: x   Leuk Esterase: x / RBC: x / WBC x   Sq Epi: x / Non Sq Epi: x / Bacteria: x      ABG:      vBG:  pH, Venous: 7.42 (09-10-24 @ 00:02)  pCO2, Venous: 39 mmHg (09-10-24 @ 00:02)  pO2, Venous: 36 mmHg (09-10-24 @ 00:02)  HCO3, Venous: 25 mmol/L (09-10-24 @ 00:02)    Micro:    Culture - Blood (collected 09-07-24 @ 16:30)  Source: .Blood Blood  Preliminary Report (09-09-24 @ 22:03):    No growth at 48 Hours    Culture - Blood (collected 09-07-24 @ 16:15)  Source: .Blood Blood  Preliminary Report (09-09-24 @ 22:02):    No growth at 48 Hours          RADIOLOGY & ADDITIONAL TESTS: Reviewed.   INTERVAL HPI/OVERNIGHT EVENTS:    SUBJECTIVE: Patient seen and examined at bedside. Denies, chest pain, SOB, N/V/ D/C.     ROS: All negative except as listed above.    VITAL SIGNS:  ICU Vital Signs Last 24 Hrs  T(C): 36.7 (10 Sep 2024 07:00), Max: 36.7 (10 Sep 2024 03:00)  T(F): 98.1 (10 Sep 2024 07:00), Max: 98.1 (10 Sep 2024 03:00)  HR: 81 (10 Sep 2024 07:00) (79 - 91)  BP: 87/52 (10 Sep 2024 07:00) (81/53 - 110/55)  BP(mean): 64 (10 Sep 2024 07:00) (62 - 76)  ABP: --  ABP(mean): --  RR: 14 (10 Sep 2024 07:00) (13 - 25)  SpO2: 95% (10 Sep 2024 07:00) (92% - 96%)    O2 Parameters below as of 10 Sep 2024 07:00  Patient On (Oxygen Delivery Method): room air            Plateau pressure:   P/F ratio:     09-09 @ 07:01  -  09-10 @ 07:00  --------------------------------------------------------  IN: 1391 mL / OUT: 3525 mL / NET: -2134 mL    09-10 @ 07:01  -  09-10 @ 07:37  --------------------------------------------------------  IN: 16.5 mL / OUT: 275 mL / NET: -258.5 mL    CI 2.8, CVP 9. MiO2 57.8, SVR: 890     CAPILLARY BLOOD GLUCOSE    POCT Blood Glucose.: 108 mg/dL (08 Sep 2024 13:12)      ECG: reviewed.    PHYSICAL EXAM:    GENERAL: NAD, lying in bed comfortably  HEAD:  Atraumatic, normocephalic  EYES: EOMI, PERRLA, conjunctiva and sclera clear  NECK: Supple, trachea midline, no JVD  HEART: Regular rate and rhythm, no murmurs, rubs, or gallops  LUNGS: Unlabored respirations.  Clear to auscultation bilaterally, no crackles, wheezing, or rhonchi  ABDOMEN: Soft, nontender, nondistended, +BS  EXTREMITIES: 2+ peripheral pulses bilaterally, cap refill<2 secs. No clubbing, cyanosis, or edema  NERVOUS SYSTEM:  A&Ox3, following commands, moving all extremities, no focal deficits   SKIN: No rashes or lesions    MEDICATIONS:  MEDICATIONS  (STANDING):  aspirin enteric coated 81 milliGRAM(s) Oral daily  cefepime   IVPB 1000 milliGRAM(s) IV Intermittent every 12 hours  cefepime   IVPB      chlorhexidine 2% Cloths 1 Application(s) Topical daily  clopidogrel Tablet 75 milliGRAM(s) Oral daily  DOBUTamine Infusion 10 MICROgram(s)/kG/Min (12.2 mL/Hr) IV Continuous <Continuous>  melatonin 5 milliGRAM(s) Oral at bedtime  mupirocin 2% Nasal 1 Application(s) Both Nostrils two times a day    MEDICATIONS  (PRN):      ALLERGIES:  Allergies    No Known Allergies    Intolerances    atorvastatin (Muscle Pain (Severe))      LABS:                        7.4    4.99  )-----------( 64       ( 10 Sep 2024 00:32 )             23.4     09-10    130<L>  |  91<L>  |  88<H>  ----------------------------<  133<H>  3.5   |  22  |  3.83<H>    Ca    8.1<L>      10 Sep 2024 00:32  Phos  4.8     09-10  Mg     2.4     09-10    TPro  6.0  /  Alb  2.7<L>  /  TBili  2.0<H>  /  DBili  x   /  AST  58<H>  /  ALT  27  /  AlkPhos  245<H>  09-10      Urinalysis Basic - ( 10 Sep 2024 00:32 )    Color: x / Appearance: x / SG: x / pH: x  Gluc: 133 mg/dL / Ketone: x  / Bili: x / Urobili: x   Blood: x / Protein: x / Nitrite: x   Leuk Esterase: x / RBC: x / WBC x   Sq Epi: x / Non Sq Epi: x / Bacteria: x      ABG:      vBG:  pH, Venous: 7.42 (09-10-24 @ 00:02)  pCO2, Venous: 39 mmHg (09-10-24 @ 00:02)  pO2, Venous: 36 mmHg (09-10-24 @ 00:02)  HCO3, Venous: 25 mmol/L (09-10-24 @ 00:02)    Micro:    Culture - Blood (collected 09-07-24 @ 16:30)  Source: .Blood Blood  Preliminary Report (09-09-24 @ 22:03):    No growth at 48 Hours    Culture - Blood (collected 09-07-24 @ 16:15)  Source: .Blood Blood  Preliminary Report (09-09-24 @ 22:02):    No growth at 48 Hours          RADIOLOGY & ADDITIONAL TESTS: Reviewed.

## 2024-09-10 NOTE — PROGRESS NOTE ADULT - ASSESSMENT
79-year-old male with a history of HFrEF on home dobutamine 7.5 with last EF less than 20%, CAD, lungs mass - SCC, and urinary retention presented transferred from clinical hospital for management failure team after having syncopal episode last night, admitted for cardiogenic shock.    Neuro  - A&0 x3  - CT head post syncopal episode with no acute injury  - continue to monitor mental status per protocol    Respiratoy  #hx lung mass, SCC c/b fluid overload  - Required CPAP 10/5 50% now weaned off to NC 2 L  - saturating well   - continue to monitor sp02    Cardiovascular  #HFrEF (last know EF%), acute decompensated heart failure  - on home dobutamine @ 7.5 mcg increased to 10 mcg given low Venous sat of 53% which gives him a CI of 2.3   - Unable to float swan however has a cordis for monitoring of venous sats  - borderline BPs without room for afterload reduction  - holding GDMT i/s/o shock  - continue strict I&O  - continue to trend lactate and perfusion indices    #CAD  - on aspirin and plavix at home  - continue    Renal  BRODY on CKD  - sCr 3.83 from 3.42, baseline usually around 2  - likely i/s/o cardiogenic shock  - strict I&O, trend renal function daily    GI  - diet as tolerated  - monitor for BM    Endo  - f/u a1c, trend glucose on cmp  - f/u tsh and lipid profile    Heme  - Hgb 7.4, will transfuse 1 U pRBCS  - platelets low  but appears chronic  - continue to trend  DVT ppx: SCDs, given thrombocytopenia    ID  - hypothermic on admission  - f/u infectious w/u  - continue to trend wbc and fever curve  - received Vanc x1; c/w cefepime empirically given hypothermia.       #Ethics: FULL CODE    Lines: PIVs, LFV Jennifer and RIJ Cordis from 9/8    ======================= DISPOSITION  =====================  [X] Critical   [ ] Guarded    [ ] Stable    [X] Maintain in CICU  [ ] Downgrade to Telemetry  [ ] Discharge Home 79-year-old male with a history of HFrEF on home dobutamine 7.5 with last EF less than 20%, CAD, lungs mass - SCC, and urinary retention presented transferred from clinical hospital for management failure team after having syncopal episode last night, admitted for cardiogenic shock.    Neuro  - A&0 x3  - CT head post syncopal episode with no acute injury  - continue to monitor mental status per protocol    Respiratoy  #hx lung mass, SCC c/b fluid overload  - Required CPAP 10/5 50% weaned off to NC 2 L; now RA  - saturating well   - continue to monitor spO2    Cardiovascular  #HFrEF (last know EF%), acute decompensated heart failure  - on home dobutamine @ 7.5 mcg increased to 10 mcg given low Venous sat of 53% which gives him a CI of 2.3 ; now weaned down to palliative 7.5mg   - Unable to float swan however has a cordis for monitoring of venous sats  - borderline BPs without room for afterload reduction  - holding GDMT i/s/o shock  - continue strict I&O  - continue to trend lactate and perfusion indices    #CAD  - on aspirin and plavix at home  - continue    Renal  BRODY on CKD  - sCr 3.83 from 3.42, baseline usually around 2  - likely i/s/o cardiogenic shock  - strict I&O, trend renal function daily    GI  - diet as tolerated  - monitor for BM    Endo  - f/u a1c, trend glucose on cmp  - f/u tsh and lipid profile    Heme  - Hgb 7.4, will transfuse 1 U pRBCS; f/u post-transfusion CBC  - platelets low  but appears chronic  - continue to trend  DVT ppx: SCDs, given thrombocytopenia    ID  - hypothermic on admission  - f/u infectious w/u  - continue to trend wbc and fever curve  - received Vanc x1; c/w cefepime empirically given hypothermia.       #Ethics: FULL CODE    Lines: PIVs, VIOLETTEV Jennifer and JENNIE Bowling from     ======================= DISPOSITION  =====================  [X] Critical   [ ] Guarded    [ ] Stable    [X] Maintain in CICU  [ ] Downgrade to Telemetry  [ ] Discharge Home

## 2024-09-10 NOTE — CONSULT NOTE ADULT - SUBJECTIVE AND OBJECTIVE BOX
Outpt cardiologist:    HPI:  79-year-old male with a history of HFrEF on home dobutamine 7.5 with last EF less than 20%, CAD, lungs mass - SCC, and urinary retention presented transferred from [facility] for management of heart failure after having syncopal episode last night.  Patient also having reduced urine output despite 6 mg of Bumex twice a day.  At [facility] patient had a workup which was notable for sodium of 124, creatinine to 4.6 from his baseline of approximately 2-2.1, proBNP 5220, lactate 1.9.  He was transferred here for further evaluation and  heart failure management, admitted to CICU.    On admission to CICU he is A&O, saturating well on room air, sinus rhythm 75 /59. He denies dizziness, chest pain, sob, N&V on admission. (07 Sep 2024 23:06)      ---  cardio fellow additional notes:        PAST MEDICAL & SURGICAL HISTORY  BPH (benign prostatic hyperplasia)    Bipolar disorder    Depression    Anxiety    Umbilical hernia, incarcerated    Constipation    Urinary retention    CAD (coronary artery disease)    SCC (squamous cell carcinoma)    Lung mass    CHF, chronic    Heart failure with reduced ejection fraction    History of arthroscopy of knee  Right, 1987    History of tonsillectomy  1952    History of hernia repair    H/O coronary angiogram        FAMILY HISTORY:  FAMILY HISTORY:  Family history of depression (Mother)    FH: CAD (coronary artery disease) (Sibling, Sibling)    Family history of diabetes mellitus (DM) (Sibling)        SOCIAL HISTORY:  Social History:      ALLERGIES:  No Known Allergies      MEDICATIONS:  aspirin enteric coated 81 milliGRAM(s) Oral daily  cefepime   IVPB      cefepime   IVPB 1000 milliGRAM(s) IV Intermittent every 12 hours  chlorhexidine 2% Cloths 1 Application(s) Topical daily  clopidogrel Tablet 75 milliGRAM(s) Oral daily  DOBUTamine Infusion 10 MICROgram(s)/kG/Min (12.2 mL/Hr) IV Continuous <Continuous>  melatonin 5 milliGRAM(s) Oral at bedtime  mupirocin 2% Nasal 1 Application(s) Both Nostrils two times a day    PRN:      HOME MEDICATIONS:  Home Medications:  aspirin 81 mg oral delayed release tablet: 1 tab(s) orally once a day (09 Sep 2024 14:55)  bumetanide 2 mg oral tablet: 3 tab(s) orally 2 times a day NOTE: As per pharmacy 1 tablet orally 2 times a day (09 Sep 2024 14:45)  clopidogrel 75 mg oral tablet: 1 tab(s) orally once a day (09 Sep 2024 14:55)  Colace 100 mg oral capsule: 1 cap(s) orally once a day (09 Sep 2024 14:55)  DOBUTamine: 7.9 milliliter(s) by continuous intravenous infusion once a day 1000mg in 250cc, infuse at 7.9ml/hr continuously daily, no stop time (09 Sep 2024 14:55)  latanoprost 0.005% ophthalmic solution: 1 drop(s) in each eye once a day (at bedtime) (09 Sep 2024 14:54)  LORazepam 0.5 mg oral tablet: 1 tab(s) orally once a day as needed for  anxiety (09 Sep 2024 14:55)  mirtazapine 7.5 mg oral tablet: 1 tab(s) orally once a day (09 Sep 2024 14:54)  potassium chloride 20 mEq oral tablet, extended release: 0.5 tab(s) orally once a day NOTE: As per Pharmacy, 1 tablet orally 2 times a day (09 Sep 2024 14:52)  temazepam 15 mg oral capsule: 1 cap(s) orally once a day (at bedtime) as needed for  insomnia (09 Sep 2024 14:55)      VITALS:   T(F): 98.1 (09-10 @ 07:00), Max: 98.4 (09-07 @ 22:22)  HR: 81 (09-10 @ 07:00) (62 - 113)  BP: 87/52 (09-10 @ 07:00) (75/50 - 116/56)  BP(mean): 64 (09-10 @ 07:00) (58 - 85)  RR: 14 (09-10 @ 07:00) (12 - 39)  SpO2: 95% (09-10 @ 07:00) (92% - 100%)    I&O's Summary    09 Sep 2024 07:01  -  10 Sep 2024 07:00  --------------------------------------------------------  IN: 1391 mL / OUT: 3525 mL / NET: -2134 mL    10 Sep 2024 07:01  -  10 Sep 2024 07:32  --------------------------------------------------------  IN: 16.5 mL / OUT: 275 mL / NET: -258.5 mL        REVIEW OF SYSTEMS:  CONSTITUTIONAL: No weakness, fevers or chills  HEENT: No visual changes, neck/ear pain  RESPIRATORY: No cough, sob  CARDIOVASCULAR: See HPI  GASTROINTESTINAL: No abdominal pain. No nausea, vomiting, diarrhea   GENITOURINARY: No dysuria, frequency or hematuria  NEUROLOGICAL: No new focal deficits  SKIN: No new rashes    PHYSICAL EXAM:  General: Not in distress.  Non-toxic appearing.   HEENT: EOMI  Cardio: regular, S1, S2, no murmur  Pulm: B/L BS.  No wheezing / crackles / rales  Abdomen: Soft, non-tender, non-distended. Normoactive bowel sounds  Extremities: No edema b/l le  Neuro: A&O x3. No focal deficits    LABS:                        7.4    4.99  )-----------( 64       ( 10 Sep 2024 00:32 )             23.4     09-10    130<L>  |  91<L>  |  88<H>  ----------------------------<  133<H>  3.5   |  22  |  3.83<H>    Ca    8.1<L>      10 Sep 2024 00:32  Phos  4.8     09-10  Mg     2.4     09-10    TPro  6.0  /  Alb  2.7<L>  /  TBili  2.0<H>  /  DBili  x   /  AST  58<H>  /  ALT  27  /  AlkPhos  245<H>  09-10              Troponin trend:      09-07 Chol 99 LDL -- HDL 58 Trig 47      RADIOLOGY:  -CXR:  -TTE:  -CCTA:  -STRESS TEST:  -CATHETERIZATION:  -OTHER:  ECG:      TELEMETRY EVENTS: Outpt cardiologist: Dr. Shell     HPI:  79-year-old male with a history of HFrEF on home dobutamine 7.5 with last EF less than 20%, CAD, lungs mass - SCC, and urinary retention presented transferred from [facility] for management of heart failure after having syncopal episode last night.  Patient also having reduced urine output despite 6 mg of Bumex twice a day.  At [facility] patient had a workup which was notable for sodium of 124, creatinine to 4.6 from his baseline of approximately 2-2.1, proBNP 5220, lactate 1.9.  He was transferred here for further evaluation and  heart failure management, admitted to CICU.    On admission to CICU he is A&O, saturating well on room air, sinus rhythm 75 /59. He denies dizziness, chest pain, sob, N&V on admission. (07 Sep 2024 23:06)    PAST MEDICAL & SURGICAL HISTORY  BPH (benign prostatic hyperplasia)    Bipolar disorder    Depression    Anxiety    Umbilical hernia, incarcerated    Constipation    Urinary retention    CAD (coronary artery disease)    SCC (squamous cell carcinoma)    Lung mass    CHF, chronic    Heart failure with reduced ejection fraction    History of arthroscopy of knee  Right, 1987    History of tonsillectomy  1952    History of hernia repair    H/O coronary angiogram        FAMILY HISTORY:  FAMILY HISTORY:  Family history of depression (Mother)    FH: CAD (coronary artery disease) (Sibling, Sibling)    Family history of diabetes mellitus (DM) (Sibling)        SOCIAL HISTORY:  Social History: neg x3      ALLERGIES:  No Known Allergies      MEDICATIONS:  aspirin enteric coated 81 milliGRAM(s) Oral daily  cefepime   IVPB      cefepime   IVPB 1000 milliGRAM(s) IV Intermittent every 12 hours  chlorhexidine 2% Cloths 1 Application(s) Topical daily  clopidogrel Tablet 75 milliGRAM(s) Oral daily  DOBUTamine Infusion 10 MICROgram(s)/kG/Min (12.2 mL/Hr) IV Continuous <Continuous>  melatonin 5 milliGRAM(s) Oral at bedtime  mupirocin 2% Nasal 1 Application(s) Both Nostrils two times a day    PRN:      HOME MEDICATIONS:  Home Medications:  aspirin 81 mg oral delayed release tablet: 1 tab(s) orally once a day (09 Sep 2024 14:55)  bumetanide 2 mg oral tablet: 3 tab(s) orally 2 times a day NOTE: As per pharmacy 1 tablet orally 2 times a day (09 Sep 2024 14:45)  clopidogrel 75 mg oral tablet: 1 tab(s) orally once a day (09 Sep 2024 14:55)  Colace 100 mg oral capsule: 1 cap(s) orally once a day (09 Sep 2024 14:55)  DOBUTamine: 7.9 milliliter(s) by continuous intravenous infusion once a day 1000mg in 250cc, infuse at 7.9ml/hr continuously daily, no stop time (09 Sep 2024 14:55)  latanoprost 0.005% ophthalmic solution: 1 drop(s) in each eye once a day (at bedtime) (09 Sep 2024 14:54)  LORazepam 0.5 mg oral tablet: 1 tab(s) orally once a day as needed for  anxiety (09 Sep 2024 14:55)  mirtazapine 7.5 mg oral tablet: 1 tab(s) orally once a day (09 Sep 2024 14:54)  potassium chloride 20 mEq oral tablet, extended release: 0.5 tab(s) orally once a day NOTE: As per Pharmacy, 1 tablet orally 2 times a day (09 Sep 2024 14:52)  temazepam 15 mg oral capsule: 1 cap(s) orally once a day (at bedtime) as needed for  insomnia (09 Sep 2024 14:55)      VITALS:   T(F): 98.1 (09-10 @ 07:00), Max: 98.4 (09-07 @ 22:22)  HR: 81 (09-10 @ 07:00) (62 - 113)  BP: 87/52 (09-10 @ 07:00) (75/50 - 116/56)  BP(mean): 64 (09-10 @ 07:00) (58 - 85)  RR: 14 (09-10 @ 07:00) (12 - 39)  SpO2: 95% (09-10 @ 07:00) (92% - 100%)    I&O's Summary    09 Sep 2024 07:01  -  10 Sep 2024 07:00  --------------------------------------------------------  IN: 1391 mL / OUT: 3525 mL / NET: -2134 mL    10 Sep 2024 07:01  -  10 Sep 2024 07:32  --------------------------------------------------------  IN: 16.5 mL / OUT: 275 mL / NET: -258.5 mL        REVIEW OF SYSTEMS:  CONSTITUTIONAL: No weakness, fevers or chills  HEENT: No visual changes, neck/ear pain  RESPIRATORY: No cough, sob  CARDIOVASCULAR: See HPI  GASTROINTESTINAL: No abdominal pain. No nausea, vomiting, diarrhea   GENITOURINARY: No dysuria, frequency or hematuria  NEUROLOGICAL: No new focal deficits  SKIN: No new rashes    PHYSICAL EXAM:  General: frail   HEENT: EOMI  Cardio: regular, S1, S2, no murmur  Pulm: B/L BS.  No wheezing / crackles / rales  Abdomen: Soft, non-tender, non-distended. Normoactive bowel sounds  Extremities: No edema b/l le  Neuro: A&O x3. No focal deficits  SKIN: intact   (+) gerardo    LABS:                        7.4    4.99  )-----------( 64       ( 10 Sep 2024 00:32 )             23.4     09-10    130<L>  |  91<L>  |  88<H>  ----------------------------<  133<H>  3.5   |  22  |  3.83<H>    Ca    8.1<L>      10 Sep 2024 00:32  Phos  4.8     09-10  Mg     2.4     09-10    TPro  6.0  /  Alb  2.7<L>  /  TBili  2.0<H>  /  DBili  x   /  AST  58<H>  /  ALT  27  /  AlkPhos  245<H>  09-10      09-07 Chol 99 LDL -- HDL 58 Trig 47      RADIOLOGY:  TTE W or WO Ultrasound Enhancing Agent (09.08.24 @ 07:21) >   1. The interventricular septum is flattened in systole and diastole consistent with right ventricular pressure and volume overload. Left ventricular systolic function is severely decreased with an ejection fraction visually estimated at 20 to 25 %. Regional wall motion abnormalities present.   2. Multiple segmental abnormalities exist. See findings.   3. Severely enlarged right ventricular cavity size, with normal wall thickness, and moderately reduced right ventricular systolic function.   4. The right atrium is severely dilated.   5. Moderate mitral regurgitation.   6. Estimated pulmonary artery systolic pressure is 49 mmHg, consistent with moderate pulmonary hypertension.   7. No prior echocardiogram is available for comparison.   8. Right pleural effusion noted.   9. The inferior vena cava is dilated measuring 2.20 cm in diameter, (dilated >2.1cm) with abnormal inspiratory collapse (abnormal <50%) consistent with elevated right atrial pressure (~15, range 10-20mmHg).  10. There is increased LV mass and eccentric hypertrophy.  11. There is no evidence of a left ventricular thrombus.

## 2024-09-10 NOTE — PROGRESS NOTE ADULT - ASSESSMENT
Assessment	  79-year-old male with a history of HFrEF on home dobutamine 7.5 with last EF less than 20%, CAD, lungs mass - SCC, and urinary retention presented transferred from clinical hospital for management failure team after having syncopal episode last night, admitted for cardiogenic shock.    Neuro  - A&0 x3, forgetful  - CT head post syncopal episode with no acute injury  - continue to monitor mental status per protocol    Respiratoy  #hx lung mass, SCC c/b fluid overload  - Required CPAP 10/ 50% weaned off to NC 2 L; now RA  - saturating well   - continue to monitor spO2    Cardiovascular  #cardiogenic shock  - hx HFrEF (last know EF%), acute decompensated heart failure, on palliative  at home 7.5 dose  - on home dobutamine @ 7.5 mcg increased to 10 mcg given low Venous sat of 53% on admission  - Unable to float swan however has a cordis for monitoring of venous sats  - PRN diuresis, last dose , continue to trend  - borderline BPs without room for afterload reduction  - holding GDMT i/s/o shock  - continue strict I&O  - continue to trend lactate and perfusion indices    #CAD  - on aspirin and plavix at home  - continue    Renal  BRODY on CKD  - sCr > 4, baseline usually around 2  - likely i/s/o cardiogenic shock  - diuresing appropriately   - strict I&O, trend renal function daily, trend lytes    GI  - diet as tolerated  - monitor for BM    Endo  - a1c 5.8, glucose controlled  - lipid panel noted    Heme  - Hgb 7.4, will transfuse 1 U pRBCS; f/u post-transfusion CBC, no signs of bleeding  - platelets low  but appears chronic  - continue to trend  DVT ppx: SCDs, given thrombocytopenia    ID  - hypothermic on admission  - continue cefepime empirically  - infectious w/u ngtd  - continue to trend wbc and fever curve    #Ethics: DNR/DNI  Lines: JENNIE Singleton Cordis from  -    ======================= DISPOSITION  =====================  [ x] Critical   [ ] Guarded    [ ] Stable    [x ] Maintain in CICU  [ ] Downgrade to Telemetry  [ ] Discharge Home    Patient requires continuous monitoring with bedside rhythm monitoring, pulse ox monitoring, and intermittent blood gas analysis. Care plan discussed with ICU care team. Patient remained critical and at risk for life threatening decompensation.  Patient seen, examined and plan discussed with CCU team during rounds.     I have personally provided 30 minutes of critical care time excluding time spent on separate procedures, in addition to initial critical care time provided by the CICU Attending, Dr. Mohamud.     Ebony Lopez, [facility]-BC

## 2024-09-10 NOTE — CONSULT NOTE ADULT - PROBLEM SELECTOR RECOMMENDATION 9
admitted in cardiogenic shock  pt continues to require IV inotrope (on dobutamine at baseline)  not a candidate for AT  management per CICU

## 2024-09-10 NOTE — CONSULT NOTE ADULT - PROBLEM SELECTOR RECOMMENDATION 3
Case discussed with Dr. Mohamud who was present for GOC discussion.    For acute issues please page palliative team.    Amanda Chavez MD  Geriatrics and Palliative Medicine Attending  [facility] pager: [phone]     The Geriatrics and Palliative Medicine consult service has 24/7 coverage for medical recommendations, including symptom management needs.

## 2024-09-11 NOTE — PROGRESS NOTE ADULT - ASSESSMENT
Assessment	  79-year-old male with a history of HFrEF on home dobutamine 7.5 with last EF less than 20%, CAD, lungs mass - SCC, and urinary retention presented transferred from clinical hospital for management failure team after having syncopal episode last night, admitted for cardiogenic shock.    Neuro  - A&0 x3, forgetful  - CT head post syncopal episode with no acute injury  - continue to monitor mental status per protocol    Respiratoy  #hx lung mass, SCC c/b fluid overload  - Required CPAP 10/5 50% weaned off to NC 2 L; now RA  - saturating well   - continue to monitor spO2    Cardiovascular  #cardiogenic shock  - hx HFrEF (last know EF%), acute decompensated heart failure, on palliative  at home 7.5 dose  - on home dobutamine @ 7.5 mcg increased to 10 mcg given low Venous sat of 53% on admission  - Unable to float swan however has a cordis for monitoring of venous sats  -  ow @ 7.5, trend central sats  - started on standing diuresis, first dose tn given IV for cvp 21  - borderline BPs without room for afterload reduction  - holding GDMT i/s/o shock  - continue strict I&O  - continue to trend lactate and perfusion indices    #CAD  - on aspirin and plavix at home  - continue    Renal  BRODY on CKD  - sCr > 4, baseline usually around 2  - likely i/s/o cardiogenic shock  - diuresis as above  - strict I&O, trend renal function daily, trend lytes    GI  - diet as tolerated  - monitor for BM    Endo  - a1c 5.8, glucose controlled  - lipid panel noted    Heme  - Hgb 7.4, will transfuse 1 U pRBCS; f/u post-transfusion CBC, no signs of bleeding  - platelets low  but appears chronic  - continue to trend  DVT ppx: SCDs, given thrombocytopenia    ID  - hypothermic on admission  - continue cefepime empirically  - infectious w/u ngtd  - continue to trend wbc and fever curve    #Ethics: DNR/DNI  Lines: PIVs, RIJ Cordis from  -    ======================= DISPOSITION  =====================  [ x] Critical   [ ] Guarded    [ ] Stable    [x ] Maintain in CICU  [ ] Downgrade to Telemetry  [ ] Discharge Home    Patient requires continuous monitoring with bedside rhythm monitoring, pulse ox monitoring, and intermittent blood gas analysis. Care plan discussed with ICU care team. Patient remained critical and at risk for life threatening decompensation.  Patient seen, examined and plan discussed with CCU team during rounds.     I have personally provided 30 minutes of critical care time excluding time spent on separate procedures, in addition to initial critical care time provided by the CICU Attending, Dr. Mohamud.     Ebony Lopez, [facility]-BC

## 2024-09-11 NOTE — DIETITIAN INITIAL EVALUATION ADULT - REASON
Patient being transitioned in room via libby lift, patient meets criteria for malnutrition through PO intake and weight loss parameters, muscle/fat depletion observed during clinical physical observation

## 2024-09-11 NOTE — DIETITIAN INITIAL EVALUATION ADULT - PERSON TAUGHT/METHOD
adequate caloric/protein intake w/ food sources reviewed, oral nutrition supplements, low sodium/HF diet guidelines (declines literature for home), strategies to increase PO intake, all questions were answered/with patient's wife as patient being attended to by nursing staff in room/verbal instruction/teach back - (Patient repeats in own words)/spouse instructed

## 2024-09-11 NOTE — DIETITIAN INITIAL EVALUATION ADULT - ORAL INTAKE PTA/DIET HISTORY
Patient's wife reports patient had periods of decreased PO intake/appetite since 4/2024 and other times where patient had ate better; had been influenced by need for hospitalizations on/off in the past few months. Denied chewing/swallowing impairment or nausea/vomiting (apart from having dry heaves for ~3 days leading into [facility] presentation which patient's wife states being informed that may have been a sign of increased fluid retention for the patient). Patient's wife reports they adhere to a very low sodium diet at home for a long period of time and adheres to daily weights for HF. Patient and patient's wife eat/cook/prepare fresh foods/minimally processed food items, and if they ever eat out they have steamed options on the menu. Planned to try Ensure shakes for more calories/protein in setting of ongoing decreased PO intake and muscle wasting reported, hadn't been able to trial it as delivery came at time of current hospitalizations.

## 2024-09-11 NOTE — DIETITIAN INITIAL EVALUATION ADULT - OTHER INFO
NKFA per patient. Patient's wife reports patient's UBW on daily weights at home recently had been 155-157lbs, had noticed daily weights started to increase for a few days coinciding with patient's symptoms and hospital presentation, used to weigh between 175-180lbs during 4/2024 and had unintentional weight loss since then (likely mix of fluid shifts and true muscle/fat loss). [facility] growth chart unable to load when attempting to review long term anthropometric data. Will monitor weight trend.    - Hyponatremia, downtrending. Management per team.  - Recently hypokalemia, WNL as of afternoon labs today, noted at lower end of normal range.  - Hyperphosphatemic, overall downtrending and nearing normal range today. If hyperphosphatemia persists, recommend addition of low phosphorus diet to diet order.  - Inotropic support w/ dobutamine.  - Ordered for bumex.

## 2024-09-11 NOTE — PROGRESS NOTE ADULT - SUBJECTIVE AND OBJECTIVE BOX
24H events:    Patient is a 79y old Male who presents with a chief complaint of cardiogenic shock (11 Sep 2024 07:39)    Primary diagnosis of Worsening heart failure     Today is hospital day 4d. This morning patient was seen and examined at bedside, resting comfortably in bed.      PAST MEDICAL & SURGICAL HISTORY  BPH (benign prostatic hyperplasia)    Bipolar disorder    Depression    Anxiety    Umbilical hernia, incarcerated    Constipation    Urinary retention    CAD (coronary artery disease)    SCC (squamous cell carcinoma)    Lung mass    CHF, chronic    Heart failure with reduced ejection fraction    History of arthroscopy of knee  Right, 1987    History of tonsillectomy  1952    History of hernia repair    H/O coronary angiogram      SOCIAL HISTORY:  Social History:      ALLERGIES:  No Known Allergies    MEDICATIONS:  STANDING MEDICATIONS  aspirin enteric coated 81 milliGRAM(s) Oral daily  cefepime   IVPB 1000 milliGRAM(s) IV Intermittent every 12 hours  cefepime   IVPB      chlorhexidine 2% Cloths 1 Application(s) Topical daily  clopidogrel Tablet 75 milliGRAM(s) Oral daily  DOBUTamine Infusion 10 MICROgram(s)/kG/Min IV Continuous <Continuous>  melatonin 5 milliGRAM(s) Oral at bedtime  milrinone Infusion 0.25 MICROgram(s)/kG/Min IV Continuous <Continuous>  mupirocin 2% Nasal 1 Application(s) Both Nostrils two times a day  polyethylene glycol 3350 17 Gram(s) Oral daily  senna 2 Tablet(s) Oral at bedtime    PRN MEDICATIONS        LABS:                        8.4    5.61  )-----------( 68       ( 11 Sep 2024 00:30 )             24.9     09-11    132<L>  |  91<L>  |  97<H>  ----------------------------<  100<H>  3.4<L>   |  23  |  4.01<H>    Ca    8.3<L>      11 Sep 2024 05:33  Phos  4.9     09-11  Mg     2.2     09-11    TPro  6.0  /  Alb  2.7<L>  /  TBili  2.1<H>  /  DBili  x   /  AST  55<H>  /  ALT  28  /  AlkPhos  229<H>  09-11    PTT - ( 11 Sep 2024 00:30 )  PTT:31.0 sec  Urinalysis Basic - ( 11 Sep 2024 05:33 )    Color: x / Appearance: x / SG: x / pH: x  Gluc: 100 mg/dL / Ketone: x  / Bili: x / Urobili: x   Blood: x / Protein: x / Nitrite: x   Leuk Esterase: x / RBC: x / WBC x   Sq Epi: x / Non Sq Epi: x / Bacteria: x    RADIOLOGY:  TTE W or WO Ultrasound Enhancing Agent (09.08.24 @ 07:21) >   1. The interventricular septum is flattened in systole and diastole consistent with right ventricular pressure and volume overload. Left ventricular systolic function is severely decreased with an ejection fraction visually estimated at 20 to 25 %. Regional wall motion abnormalities present.   2. Multiple segmental abnormalities exist. See findings.   3. Severely enlarged right ventricular cavity size, with normal wall thickness, and moderately reduced right ventricular systolic function.   4. The right atrium is severely dilated.   5. Moderate mitral regurgitation.   6. Estimated pulmonary artery systolic pressure is 49 mmHg, consistent with moderate pulmonary hypertension.   7. No prior echocardiogram is available for comparison.   8. Right pleural effusion noted.   9. The inferior vena cava is dilated measuring 2.20 cm in diameter, (dilated >2.1cm) with abnormal inspiratory collapse (abnormal <50%) consistent with elevated right atrial pressure (~15, range 10-20mmHg).  10. There is increased LV mass and eccentric hypertrophy.  11. There is no evidence of a left ventricular thrombus.      VITALS:   T(F): 96.8  HR: 80  BP: 81/49  RR: 19  SpO2: 95%    PHYSICAL EXAM:  GENERAL: frail   HEAD:  Atraumatic, Normocephalic  EYES: EOMI  NECK: Supple  NERVOUS SYSTEM:  Alert & Oriented X3, non focal   CHEST/LUNG: Clear to auscultation bilaterally; No rales, rhonchi, wheezing, or rubs  HEART: Regular rate and rhythm; No murmurs, rubs, or gallops  ABDOMEN: Soft, Nontender, Nondistended; Bowel sounds present  EXTREMITIES:  2+ Peripheral Pulses, No clubbing, cyanosis, or edema  LYMPH: No lymphadenopathy noted  SKIN: purpuric rash on back   (+) TLC, (+) Ramesh

## 2024-09-11 NOTE — PROGRESS NOTE ADULT - SUBJECTIVE AND OBJECTIVE BOX
Seen in CCU, comfortable     Vital Signs Last 24 Hrs  T(C): 36.1 (09-11-24 @ 15:00), Max: 36.6 (09-10-24 @ 19:00)  T(F): 97 (09-11-24 @ 15:00), Max: 97.9 (09-10-24 @ 19:00)  HR: 84 (09-11-24 @ 14:00) (76 - 106)  BP: 91/51 (09-11-24 @ 14:00) (74/48 - 99/51)  BP(mean): 66 (09-11-24 @ 14:00) (55 - 72)  RR: 24 (09-11-24 @ 14:00) (12 - 27)  SpO2: 94% (09-11-24 @ 14:00) (93% - 99%)    I&O's Detail    10 Sep 2024 07:01  -  11 Sep 2024 07:00  --------------------------------------------------------  IN:    DOBUTamine: 57.5 mL    DOBUTamine: 17.2 mL    DOBUTamine: 195.5 mL    IV PiggyBack: 400 mL    Oral Fluid: 670 mL    PRBCs (Packed Red Blood Cells): 300 mL  Total IN: 1640.2 mL    OUT:    Indwelling Catheter - Urethral (mL): 4145 mL  Total OUT: 4145 mL    11 Sep 2024 07:01  -  11 Sep 2024 15:03  --------------------------------------------------------  IN:    DOBUTamine: 57.5 mL    DOBUTamine: 25.5 mL    IV PiggyBack: 85 mL    Milrinone: 22.8 mL    Oral Fluid: 120 mL  Total IN: 310.8 mL    OUT:    Indwelling Catheter - Urethral (mL): 715 mL  Total OUT: 715 mL    Respiratory b/l air entry  Cardiovascular S1 S2  Gastrointestinal soft, ND  Extremities no edema                              8.4    5.61  )-----------( 68       ( 11 Sep 2024 00:30 )             24.9     11 Sep 2024 05:33    132    |  91     |  97     ----------------------------<  100    3.4     |  23     |  4.01     Ca    8.3        11 Sep 2024 05:33  Phos  4.9       11 Sep 2024 05:33  Mg     2.2       11 Sep 2024 05:33    TPro  6.0    /  Alb  2.7    /  TBili  2.1    /  DBili  x      /  AST  55     /  ALT  28     /  AlkPhos  229    11 Sep 2024 05:33    LIVER FUNCTIONS - ( 11 Sep 2024 05:33 )  Alb: 2.7 g/dL / Pro: 6.0 g/dL / ALK PHOS: 229 U/L / ALT: 28 U/L / AST: 55 U/L / GGT: x           PTT - ( 11 Sep 2024 00:30 )  PTT:31.0 sec  CAPILLARY BLOOD GLUCOSE    aspirin enteric coated 81 milliGRAM(s) Oral daily  cefepime   IVPB      cefepime   IVPB 1000 milliGRAM(s) IV Intermittent every 12 hours  chlorhexidine 2% Cloths 1 Application(s) Topical daily  clopidogrel Tablet 75 milliGRAM(s) Oral daily  DOBUTamine Infusion 7.5 MICROgram(s)/kG/Min IV Continuous <Continuous>  melatonin 5 milliGRAM(s) Oral at bedtime  mupirocin 2% Nasal 1 Application(s) Both Nostrils two times a day  polyethylene glycol 3350 17 Gram(s) Oral daily  senna 2 Tablet(s) Oral at bedtime    Assessment and Recommendation:     CM, EF ~ 20 - 25%, mod MR, lung ca  Cardio-renal BRODY/CKD 3/4 (baseline Cr ~ 2.)   SONO w/mild R hydro   Good UO  Diuresis, hemodynamic support per CCU team  Borderline BP noted   Avoid nephrotoxins  F/u BMP, UO  Hoping to avoid the need for further RRT  Prognosis is guarded overall    [phone]

## 2024-09-11 NOTE — DIETITIAN INITIAL EVALUATION ADULT - FACTORS AFF FOOD INTAKE
none currently, strong return of appetite reported during admission currently, had to call at certain meals for alternative meal selection/none

## 2024-09-11 NOTE — DIETITIAN INITIAL EVALUATION ADULT - SIGNS/SYMPTOMS
pt w/ >10% wt loss x6mo, <75% PO >/= 3mo, muscle/fat depletion, moderate edema pt tx for acute decompensated heart failure per chart, hx HF PTA

## 2024-09-11 NOTE — DIETITIAN INITIAL EVALUATION ADULT - ADD RECOMMEND
1. continue current diet as tolerated of: DASH/TLC diet  2. hyperphosphatemia: downtrending, if remains elevated recommend addition of low phosphorus diet to diet order  3. recommend addition of Nepro Shake BID for extra caloric/protein intake, can recommend to change to Ensure Plus HP BID when phosphorus level remains within normal range  4. hypokalemia: supplementation noted, continue to follow electrolyte trends, replete levels as appropriate  5. monitor PO intake, weight trend, electrolytes, blood glucose levels, labs, BMs

## 2024-09-11 NOTE — DIETITIAN INITIAL EVALUATION ADULT - REASON FOR ADMISSION
Heart failure    Per chart, patient is a 80 y/o male with PMH including HFrEF on home dobutamine (w/ last EF less than 20%), CAD, h/o lung mass - SCC, urinary retention. Patient presents to [facility] as a transfer from [facility] after initial presentation for management of heart failure after having syncopal episode the night before admission. Transfers to [facility] for further evaluation and heart failure management, admitted to CICU.

## 2024-09-11 NOTE — PROGRESS NOTE ADULT - ATTENDING COMMENTS
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 at 7.5  Admitted with worsening cardiogenic shock  A+Ox3  Cardiogenic shock requiring high-dose Dobutamine - attempt to wean to outpatient dose  Attempted to transition to Milrinone but patient became hypotensive  O2 sats mid 90s on nasal cannula - wean to room air  DASH diet  Non-oliguric BRODY, volume status has improved - decrease diuresis to target 500 cc overall negative  H/H low but acceptable s/p PRBC transfusion  Holding pharmacologic DVT prophylaxis given thrombocytopenia   Afebrile, on empiric Cefepime for concern for pneumonia - follow up cultures  Sugars controlled  JENNIE Bowling 9/7  Poor prognosis - recently was DNR but now wants to be full code 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 at 7.5  Admitted with worsening cardiogenic shock  A+Ox3  Cardiogenic shock requiring high-dose Dobutamine - will wean to outpatient dose  Attempted to transition to Milrinone but patient became hypotensive  O2 sats mid 90s on nasal cannula - wean to room air  DASH diet  Non-oliguric BRODY, volume status has improved - decrease diuresis to target 500 cc overall negative  H/H low but acceptable s/p PRBC transfusion  Holding pharmacologic DVT prophylaxis given thrombocytopenia   Afebrile, on empiric Cefepime for concern for pneumonia - follow up cultures  Sugars controlled  RIJ Cordcristóbal 9/7  Poor prognosis - now DNR/DNI again. Palliative continues to follow.

## 2024-09-11 NOTE — PROGRESS NOTE ADULT - SUBJECTIVE AND OBJECTIVE BOX
PATIENT:  ELYSE MALAVE  [id number]    CHIEF COMPLAINT:  Patient is a 79y old  Male who presents with a chief complaint of Heart failure    Per chart, patient is a 78 y/o male with PMH including HFrEF on home dobutamine (w/ last EF less than 20%), CAD, h/o lung mass - SCC, urinary retention. Patient presents to [facility] as a transfer from [facility] after initial presentation for management of heart failure after having syncopal episode the night before admission. Transfers to [facility] for further evaluation and heart failure management, admitted to CICU. (11 Sep 2024 17:15)      INTERVAL HISTORYOVERNIGHT EVENTS:      REVIEW OF SYSTEMS:    Constitutional:     [ ] negative [ ] fevers [ ] chills [ ] weight loss [ ] weight gain  HEENT:                  [ ] negative [ ] dry eyes [ ] eye irritation [ ] postnasal drip [ ] nasal congestion  CV:                         [ ] negative  [ ] chest pain [ ] orthopnea [ ] palpitations [ ] murmur  Resp:                     [ ] negative [ ] cough [ ] shortness of breath [ ] dyspnea [ ] wheezing [ ] sputum [ ] hemoptysis  GI:                          [ ] negative [ ] nausea [ ] vomiting [ ] diarrhea [ ] constipation [ ] abd pain [ ] dysphagia   :                        [ ] negative [ ] dysuria [ ] nocturia [ ] hematuria [ ] increased urinary frequency  Musculoskeletal: [ ] negative [ ] back pain [ ] myalgias [ ] arthralgias [ ] fracture  Skin:                       [ ] negative [ ] rash [ ] itch  Neurological:        [ ] negative [ ] headache [ ] dizziness [ ] syncope [ ] weakness [ ] numbness  Psychiatric:           [ ] negative [ ] anxiety [ ] depression  Endocrine:            [ ] negative [ ] diabetes [ ] thyroid problem  Heme/Lymph:      [ ] negative [ ] anemia [ ] bleeding problem  Allergic/Immune: [ ] negative [ ] itchy eyes [ ] nasal discharge [ ] hives [ ] angioedema    [ ] All other systems negative  [ ] Unable to assess ROS because ________.    MEDICATIONS:  MEDICATIONS  (STANDING):  aspirin enteric coated 81 milliGRAM(s) Oral daily  buMETAnide 2 milliGRAM(s) Oral <User Schedule>  cefepime   IVPB      cefepime   IVPB 1000 milliGRAM(s) IV Intermittent every 12 hours  chlorhexidine 2% Cloths 1 Application(s) Topical daily  clopidogrel Tablet 75 milliGRAM(s) Oral daily  DOBUTamine Infusion 7.5 MICROgram(s)/kG/Min (8.6 mL/Hr) IV Continuous <Continuous>  melatonin 5 milliGRAM(s) Oral at bedtime  mupirocin 2% Nasal 1 Application(s) Both Nostrils two times a day  polyethylene glycol 3350 17 Gram(s) Oral daily  senna 2 Tablet(s) Oral at bedtime    MEDICATIONS  (PRN):      ALLERGIES:  Allergies    No Known Allergies    Intolerances    atorvastatin (Muscle Pain (Severe))      OBJECTIVE:  ICU Vital Signs Last 24 Hrs  T(C): 36.6 (11 Sep 2024 19:00), Max: 36.6 (11 Sep 2024 19:00)  T(F): 97.9 (11 Sep 2024 19:00), Max: 97.9 (11 Sep 2024 19:00)  HR: 77 (11 Sep 2024 20:00) (75 - 106)  BP: 84/49 (11 Sep 2024 20:00) (74/43 - 99/51)  BP(mean): 63 (11 Sep 2024 20:) (54 - 72)  ABP: --  ABP(mean): --  RR: 27 (11 Sep 2024 20:00) (14 - 27)  SpO2: 97% (11 Sep 2024 20:00) (93% - 99%)    O2 Parameters below as of 11 Sep 2024 20:00  Patient On (Oxygen Delivery Method): room air            Adult Advanced Hemodynamics Last 24 Hrs  CVP(mm Hg): 11 (11 Sep 2024 11:00) (4 - 15)  CVP(cm H2O): --  CO: --  CI: --  PA: --  PA(mean): --  PCWP: --  SVR: --  SVRI: --  PVR: --  PVRI: --  CAPILLARY BLOOD GLUCOSE        CAPILLARY BLOOD GLUCOSE        I&O's Summary    10 Sep 2024 07:01  -  11 Sep 2024 07:00  --------------------------------------------------------  IN: 1640.2 mL / OUT: 4145 mL / NET: -2504.8 mL    11 Sep 2024 07:01  -  11 Sep 2024 20:07  --------------------------------------------------------  IN: 733.4 mL / OUT: 1170 mL / NET: -436.6 mL      Daily     Daily Weight in k.3 (11 Sep 2024 05:00)    PHYSICAL EXAMINATION:  General: WN/WD NAD  HEENT: PERRLA, EOMI, moist mucous membranes  Neurology: A&Ox3, nonfocal, GARCIA x 4  Respiratory: CTA B/L, normal respiratory effort, no wheezes, crackles, rales  CV: RRR, S1S2, no murmurs, rubs or gallops  Abdominal: Soft, NT, ND +BS, Last BM  Extremities: No edema, + peripheral pulses  Incisions:   Tubes:    LABS:                          8.4    5.61  )-----------( 68       ( 11 Sep 2024 00:30 )             24.9         127<L>  |  89<L>  |  92<H>  ----------------------------<  139<H>  3.5   |  23  |  3.92<H>    Ca    8.1<L>      11 Sep 2024 14:58  Phos  4.9       Mg     2.2         TPro  5.9<L>  /  Alb  2.6<L>  /  TBili  1.9<H>  /  DBili  x   /  AST  55<H>  /  ALT  23  /  AlkPhos  223<H>      LIVER FUNCTIONS - ( 11 Sep 2024 14:58 )  Alb: 2.6 g/dL / Pro: 5.9 g/dL / ALK PHOS: 223 U/L / ALT: 23 U/L / AST: 55 U/L / GGT: x           PTT - ( 11 Sep 2024 00:30 )  PTT:31.0 sec        Urinalysis Basic - ( 11 Sep 2024 14:58 )    Color: x / Appearance: x / SG: x / pH: x  Gluc: 139 mg/dL / Ketone: x  / Bili: x / Urobili: x   Blood: x / Protein: x / Nitrite: x   Leuk Esterase: x / RBC: x / WBC x   Sq Epi: x / Non Sq Epi: x / Bacteria: x        TELEMETRY:     EKG:     IMAGING:       PATIENT:  ELYSE MALAVE  [id number]    CHIEF COMPLAINT:  Patient is a 79y old  Male who presents with a chief complaint of Heart failure    Per chart, patient is a 78 y/o male with PMH including HFrEF on home dobutamine (w/ last EF less than 20%), CAD, h/o lung mass - SCC, urinary retention. Patient presents to [facility] as a transfer from [facility] after initial presentation for management of heart failure after having syncopal episode the night before admission. Transfers to [facility] for further evaluation and heart failure management, admitted to CICU. (11 Sep 2024 17:15)      INTERVAL HISTORY:  weaned to 7.5    REVIEW OF SYSTEMS:    Constitutional:     [ ] negative [ ] fevers [ ] chills [ ] weight loss [ ] weight gain  HEENT:                  [ ] negative [ ] dry eyes [ ] eye irritation [ ] postnasal drip [ ] nasal congestion  CV:                         [ ] negative  [ ] chest pain [ ] orthopnea [ ] palpitations [ ] murmur  Resp:                     [ ] negative [ ] cough [ ] shortness of breath [ ] dyspnea [ ] wheezing [ ] sputum [ ] hemoptysis  GI:                          [ ] negative [ ] nausea [ ] vomiting [ ] diarrhea [ ] constipation [ ] abd pain [ ] dysphagia   :                        [ ] negative [ ] dysuria [ ] nocturia [ ] hematuria [ ] increased urinary frequency  Musculoskeletal: [ ] negative [ ] back pain [ ] myalgias [ ] arthralgias [ ] fracture  Skin:                       [ ] negative [ ] rash [ ] itch  Neurological:        [ ] negative [ ] headache [ ] dizziness [ ] syncope [ ] weakness [ ] numbness    MEDICATIONS:  MEDICATIONS  (STANDING):  aspirin enteric coated 81 milliGRAM(s) Oral daily  buMETAnide 2 milliGRAM(s) Oral <User Schedule>  cefepime   IVPB      cefepime   IVPB 1000 milliGRAM(s) IV Intermittent every 12 hours  chlorhexidine 2% Cloths 1 Application(s) Topical daily  clopidogrel Tablet 75 milliGRAM(s) Oral daily  DOBUTamine Infusion 7.5 MICROgram(s)/kG/Min (8.6 mL/Hr) IV Continuous <Continuous>  melatonin 5 milliGRAM(s) Oral at bedtime  mupirocin 2% Nasal 1 Application(s) Both Nostrils two times a day  polyethylene glycol 3350 17 Gram(s) Oral daily  senna 2 Tablet(s) Oral at bedtime    MEDICATIONS  (PRN):    ALLERGIES:  Allergies    No Known Allergies    Intolerances    atorvastatin (Muscle Pain (Severe))    OBJECTIVE:  ICU Vital Signs Last 24 Hrs  T(C): 36.6 (11 Sep 2024 19:00), Max: 36.6 (11 Sep 2024 19:00)  T(F): 97.9 (11 Sep 2024 19:00), Max: 97.9 (11 Sep 2024 19:00)  HR: 77 (11 Sep 2024 20:) (75 - 106)  BP: 84/49 (11 Sep 2024 20:) (74/43 - 99/51)  BP(mean): 63 (11 Sep 2024 20:) (54 - 72)  ABP: --  ABP(mean): --  RR: 27 (11 Sep 2024 20:) (14 - 27)  SpO2: 97% (11 Sep 2024 20:) (93% - 99%)    O2 Parameters below as of 11 Sep 2024 20:00  Patient On (Oxygen Delivery Method): room air      Adult Advanced Hemodynamics Last 24 Hrs  CVP(mm Hg): 11 (11 Sep 2024 11:00) (4 - 15)  CVP(cm H2O): --  CO: --  CI: --  PA: --  PA(mean): --  PCWP: --  SVR: --  SVRI: --  PVR: --  PVRI: --  CAPILLARY BLOOD GLUCOSE  CAPILLARY BLOOD GLUCOSE        I&O's Summary    10 Sep 2024 07:01  -  11 Sep 2024 07:00  --------------------------------------------------------  IN: 1640.2 mL / OUT: 4145 mL / NET: -2504.8 mL    11 Sep 2024 07:01  -  11 Sep 2024 20:07  --------------------------------------------------------  IN: 733.4 mL / OUT: 1170 mL / NET: -436.6 mL      Daily     Daily Weight in k.3 (11 Sep 2024 05:00)    LABS:                          8.4    5.61  )-----------( 68       ( 11 Sep 2024 00:30 )             24.9     -    127<L>  |  89<L>  |  92<H>  ----------------------------<  139<H>  3.5   |  23  |  3.92<H>    Ca    8.1<L>      11 Sep 2024 14:58  Phos  4.9       Mg     2.2         TPro  5.9<L>  /  Alb  2.6<L>  /  TBili  1.9<H>  /  DBili  x   /  AST  55<H>  /  ALT  23  /  AlkPhos  223<H>      LIVER FUNCTIONS - ( 11 Sep 2024 14:58 )  Alb: 2.6 g/dL / Pro: 5.9 g/dL / ALK PHOS: 223 U/L / ALT: 23 U/L / AST: 55 U/L / GGT: x           PTT - ( 11 Sep 2024 00:30 )  PTT:31.0 sec        Urinalysis Basic - ( 11 Sep 2024 14:58 )    Color: x / Appearance: x / SG: x / pH: x  Gluc: 139 mg/dL / Ketone: x  / Bili: x / Urobili: x   Blood: x / Protein: x / Nitrite: x   Leuk Esterase: x / RBC: x / WBC x   Sq Epi: x / Non Sq Epi: x / Bacteria: x        TELEMETRY:     EKG:     IMAGING:

## 2024-09-11 NOTE — CONSULT NOTE ADULT - ATTENDING COMMENTS
Purpura appears non inflammatory. May be secondary (trauma, iatrogenic) or related to systemic process (i.e. amyloidosis). Vasculitis less likely. Will monitor as above, consider biopsy if progressing.

## 2024-09-11 NOTE — CONSULT NOTE ADULT - ASSESSMENT
**Please follow-up final note once staffed with attending    Doug Ellington MD  Resident Physician, PGY3  [facility]  Pager: [phone]  Office: [phone].   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  We have discussed the nature and course of this condition.  DDx includes favoring benign dependent purpura vs less likely vasculitic process given distribution, non-palpable, no other associated symptoms  Non-inflammatory on exam today   Management options discussed conservative monitoring vs biopsy  Patient and his wife wish to monitor conservatively for now   PLAN   -- conservative management   -- will continue to monitor and can consider biopsy if new or worsening skin lesions develop    Patient can follow up with us in the [facility] located at [address] [place] [address] [address], [place] [address] upon discharge. Our office will call to schedule an appointment but if patient does not hear from us within a few days of discharge, please instruct patient to call our office. Office phone number is [phone].    Patient was seen at bedside and staffed in person with the dermatology attending Dr. Vianney Spear  Recommendations were communicated with the primary team.  Please page [phone] for further related questions.    Doug Ellington MD  Resident Physician, PGY3  [facility]  Pager: [phone]  Office: [phone].

## 2024-09-11 NOTE — PROGRESS NOTE ADULT - ASSESSMENT
79-year-old male with a history of HFrEF on home dobutamine 7.5 with last EF less than 20%, CAD, lungs mass - SCC, and urinary retention presented transferred from clinical hospital for management failure team after having syncopal episode last night, admitted for cardiogenic shock.    Neuro  - A&0 x3  - CT head post syncopal episode with no acute injury  - continue to monitor mental status per protocol    Respiratoy  #hx lung mass, SCC c/b fluid overload  - Required CPAP 10/5 50% weaned off to NC 2 L; now RA  - saturating well   - continue to monitor spO2    Cardiovascular  #HFrEF (last know EF%), acute decompensated heart failure  - on home dobutamine @ 7.5 mcg increased to 10 mcg given low Venous sat of 53% which gives him a CI of 2.3 ; now weaned down to palliative 7.5mg   - Unable to float swan however has a cordis for monitoring of venous sats  - borderline BPs without room for afterload reduction  - holding GDMT i/s/o shock  - continue strict I&O  - continue to trend lactate and perfusion indices    #CAD  - on aspirin and plavix at home  - continue    Renal  BRODY on CKD  - sCr 3.83 from 3.42, baseline usually around 2  - likely i/s/o cardiogenic shock  - strict I&O, trend renal function daily    GI  - diet as tolerated  - monitor for BM    Endo  - f/u a1c, trend glucose on cmp  - f/u tsh and lipid profile    Heme  - Hgb 7.4, will transfuse 1 U pRBCS; f/u post-transfusion CBC  - platelets low  but appears chronic  - continue to trend  DVT ppx: SCDs, given thrombocytopenia    ID  - hypothermic on admission  - f/u infectious w/u  - continue to trend wbc and fever curve  - received Vanc x1; c/w cefepime empirically given hypothermia.       #Ethics: FULL CODE    Lines: PIVs, VIOLETTEV Jennifer and JENNIE Bowling from     ======================= DISPOSITION  =====================  [X] Critical   [ ] Guarded    [ ] Stable    [X] Maintain in CICU  [ ] Downgrade to Telemetry  [ ] Discharge Home 79-year-old male with a history of HFrEF on home dobutamine 7.5 with last EF less than 20%, CAD, lungs mass - SCC, and urinary retention presented transferred from clinical hospital for management failure team after having syncopal episode last night, admitted for cardiogenic shock.    Neuro  - A&0 x3  - CT head post syncopal episode with no acute injury  - continue to monitor mental status per protocol    Respiratoy  #hx lung mass, SCC c/b fluid overload  - Required CPAP 10/5 50% weaned off to NC 2 L; now RA  - saturating well   - continue to monitor spO2    Cardiovascular  #HFrEF (last know EF%), acute decompensated heart failure  - on home dobutamine @ 7.5 mcg increased to 10 mcg given low Venous sat of 53% which gives him a CI of 2.3 ; now weaned down to palliative 7.5mg   - milrinone 20mg  - Unable to float swan however has a cordis for monitoring of venous sats  - borderline BPs without room for afterload reduction  - holding GDMT i/s/o shock  - continue strict I&O  - continue to trend lactate and perfusion indices    #CAD  - on aspirin and plavix at home  - continue    Renal  BRODY on CKD  - sCr 4.01, baseline usually around 2  - likely i/s/o cardiogenic shock  - strict I&O, trend renal function daily    GI  - diet as tolerated  - monitor for BM    Endo  - f/u a1c, trend glucose on cmp  - f/u tsh and lipid profile    Heme  - Hgb 7.4, will transfuse 1 U pRBCS; f/u post-transfusion CBC  - platelets low  but appears chronic  - continue to trend  DVT ppx: SCDs, given thrombocytopenia    ID  - hypothermic on admission  - f/u infectious w/u  - continue to trend wbc and fever curve  - received Vanc x1; c/w cefepime empirically given hypothermia.       #Ethics: FULL CODE    Lines: Mani, ROMEO Rodriguez and JENNIE Bowling from     ======================= DISPOSITION  =====================  [X] Critical   [ ] Guarded    [ ] Stable    [X] Maintain in CICU  [ ] Downgrade to Telemetry  [ ] Discharge Home 79-year-old male with a history of HFrEF on home dobutamine 7.5 with last EF less than 20%, CAD, lungs mass - SCC, and urinary retention presented transferred from clinical hospital for management failure team after having syncopal episode last night, admitted for cardiogenic shock.    Neuro  - A&0 x3  - CT head post syncopal episode with no acute injury  - continue to monitor mental status per protocol    Respiratoy  #hx lung mass, SCC c/b fluid overload  - Required CPAP 10/5 50% weaned off to NC 2 L; now RA  - saturating well   - continue to monitor spO2    Cardiovascular  #HFrEF (last know EF%), acute decompensated heart failure  - on home dobutamine @ 7.5 mcg increased to 10 mcg given low Venous sat of 53% which gives him a CI of 2.3 ; now weaned down to palliative 7.5mg   - milrinone 20mg  - Unable to float swan however has a cordis for monitoring of venous sats  - borderline BPs without room for afterload reduction  - holding GDMT i/s/o shock  - continue strict I&O  - continue to trend lactate and perfusion indices    #CAD  - on aspirin and plavix at home  - continue    Renal  BRODY on CKD  - sCr 4.01, baseline usually around 2  - likely i/s/o cardiogenic shock  - strict I&O, trend renal function daily    GI  - diet as tolerated  - monitor for BM    Endo  - f/u a1c, trend glucose on cmp  - f/u tsh and lipid profile    Heme  - Hgb 7.4, will transfuse 1 U pRBCS; f/u post-transfusion CBC  - platelets low  but appears chronic  - continue to trend  DVT ppx: SCDs, given thrombocytopenia    ID  - hypothermic on admission  - f/u infectious w/u  - continue to trend wbc and fever curve  - received Vanc x1; c/w cefepime empirically given hypothermia.       #Ethics: DNR/ DNI    Lines: JENNIE Singleton Cordcristóbal from     ======================= DISPOSITION  =====================  [X] Critical   [ ] Guarded    [ ] Stable    [X] Maintain in CICU  [ ] Downgrade to Telemetry  [ ] Discharge Home 79-year-old male with a history of HFrEF on home dobutamine 7.5 with last EF less than 20%, CAD, lungs mass - SCC, and urinary retention presented transferred from clinical hospital for management failure team after having syncopal episode last night, admitted for cardiogenic shock.    Neuro  - A&0 x3  - CT head post syncopal episode with no acute injury  - continue to monitor mental status per protocol    Respiratory  #hx lung mass, SCC c/b fluid overload  - Required CPAP 10/5 50% weaned off to NC 2 L; now RA  - saturating well   - continue to monitor spO2    Cardiovascular  #HFrEF (last know EF%), acute decompensated heart failure  - on home dobutamine @ 7.5 mcg increased to 10 mcg given low Venous; now will decrease to 7.5mg  - milrinone 20mg started, patient became hypotensive, d/c  - Unable to float swan however has a cordis for monitoring of venous sats  - borderline BPs without room for afterload reduction  - holding GDMT i/s/o shock  - continue strict I&O  - continue to trend lactate and perfusion indices    #CAD  - on aspirin and plavix at home  - continue    Renal  BRODY on CKD  - sCr 4.01, baseline usually around 2  - likely i/s/o cardiogenic shock  - strict I&O, trend renal function daily    GI  - diet as tolerated  - monitor for BM    Endo  - f/u a1c, trend glucose on cmp  - f/u tsh and lipid profile    Heme  - Hgb 8.4  - platelets low  but appears chronic  - continue to trend  DVT ppx: SCDs, given thrombocytopenia    ID  - hypothermic on admission  - f/u infectious w/u  - continue to trend wbc and fever curve  - received Vanc x1; c/w cefepime empirically given hypothermia.     SKIN:  - new purpuric papules on back , with echymosis on lower back  - derm c/s, will appreciate recs    #Ethics: DNR/ DNI    Lines: JENNIE Singleton from 9/8    ======================= DISPOSITION  =====================  [X] Critical   [ ] Guarded    [ ] Stable    [X] Maintain in CICU  [ ] Downgrade to Telemetry  [ ] Discharge Home

## 2024-09-11 NOTE — DIETITIAN INITIAL EVALUATION ADULT - ETIOLOGY
hypermetabolic demand for nutrients 2/2 acute on chronic illness chronic illness (HF) w/ reported decrease in PO intake and weight loss PTA

## 2024-09-11 NOTE — PROGRESS NOTE ADULT - SUBJECTIVE AND OBJECTIVE BOX
INTERVAL HPI/OVERNIGHT EVENTS:    SUBJECTIVE: Patient seen and examined at bedside.     ROS: All negative except as listed above.    VITAL SIGNS:  ICU Vital Signs Last 24 Hrs  T(C): 36 (11 Sep 2024 07:00), Max: 36.6 (10 Sep 2024 19:00)  T(F): 96.8 (11 Sep 2024 07:00), Max: 97.9 (10 Sep 2024 19:00)  HR: 79 (11 Sep 2024 07:00) (76 - 123)  BP: 84/52 (11 Sep 2024 07:00) (79/51 - 100/58)  BP(mean): 64 (11 Sep 2024 07:00) (60 - 76)  ABP: --  ABP(mean): --  RR: 20 (11 Sep 2024 07:00) (12 - 27)  SpO2: 99% (11 Sep 2024 07:00) (93% - 99%)    O2 Parameters below as of 11 Sep 2024 07:00  Patient On (Oxygen Delivery Method): room air            Plateau pressure:   P/F ratio:     09-10 @ 07:01 - 09-11 @ 07:00  --------------------------------------------------------  IN: 1640.2 mL / OUT: 4145 mL / NET: -2504.8 mL    09-11 @ 07:01 - 09-11 @ 07:40  --------------------------------------------------------  IN: 16.5 mL / OUT: 20 mL / NET: -3.5 mL      CAPILLARY BLOOD GLUCOSE          ECG: reviewed.    PHYSICAL EXAM:    GENERAL: NAD, lying in bed comfortably  HEAD:  Atraumatic, normocephalic  EYES: EOMI, PERRLA, conjunctiva and sclera clear  NECK: Supple, trachea midline, no JVD  HEART: Regular rate and rhythm, no murmurs, rubs, or gallops  LUNGS: Unlabored respirations.  Clear to auscultation bilaterally, no crackles, wheezing, or rhonchi  ABDOMEN: Soft, nontender, nondistended, +BS  EXTREMITIES: 2+ peripheral pulses bilaterally, cap refill<2 secs. No clubbing, cyanosis, or edema  NERVOUS SYSTEM:  A&Ox3, following commands, moving all extremities, no focal deficits   SKIN: No rashes or lesions    MEDICATIONS:  MEDICATIONS  (STANDING):  aspirin enteric coated 81 milliGRAM(s) Oral daily  cefepime   IVPB      cefepime   IVPB 1000 milliGRAM(s) IV Intermittent every 12 hours  chlorhexidine 2% Cloths 1 Application(s) Topical daily  clopidogrel Tablet 75 milliGRAM(s) Oral daily  DOBUTamine Infusion 10 MICROgram(s)/kG/Min (11.5 mL/Hr) IV Continuous <Continuous>  melatonin 5 milliGRAM(s) Oral at bedtime  mupirocin 2% Nasal 1 Application(s) Both Nostrils two times a day  polyethylene glycol 3350 17 Gram(s) Oral daily  senna 2 Tablet(s) Oral at bedtime    MEDICATIONS  (PRN):      ALLERGIES:  Allergies    No Known Allergies    Intolerances    atorvastatin (Muscle Pain (Severe))      LABS:                        8.4    5.61  )-----------( 68       ( 11 Sep 2024 00:30 )             24.9     09-11    132<L>  |  91<L>  |  97<H>  ----------------------------<  100<H>  3.4<L>   |  23  |  4.01<H>    Ca    8.3<L>      11 Sep 2024 05:33  Phos  4.9     09-11  Mg     2.2     09-11    TPro  6.0  /  Alb  2.7<L>  /  TBili  2.1<H>  /  DBili  x   /  AST  55<H>  /  ALT  28  /  AlkPhos  229<H>  09-11    PTT - ( 11 Sep 2024 00:30 )  PTT:31.0 sec  Urinalysis Basic - ( 11 Sep 2024 05:33 )    Color: x / Appearance: x / SG: x / pH: x  Gluc: 100 mg/dL / Ketone: x  / Bili: x / Urobili: x   Blood: x / Protein: x / Nitrite: x   Leuk Esterase: x / RBC: x / WBC x   Sq Epi: x / Non Sq Epi: x / Bacteria: x      ABG:      vBG:  pH, Venous: 7.44 (09-11-24 @ 00:23)  pCO2, Venous: 40 mmHg (09-11-24 @ 00:23)  pO2, Venous: 36 mmHg (09-11-24 @ 00:23)  HCO3, Venous: 27 mmol/L (09-11-24 @ 00:23)    Micro:    Culture - Blood (collected 09-07-24 @ 16:30)  Source: .Blood Blood  Preliminary Report (09-10-24 @ 22:01):    No growth at 72 Hours    Culture - Blood (collected 09-07-24 @ 16:15)  Source: .Blood Blood  Preliminary Report (09-10-24 @ 22:01):    No growth at 72 Hours          RADIOLOGY & ADDITIONAL TESTS: Reviewed.   INTERVAL HPI/OVERNIGHT EVENTS: yesterday  was decreased 10 --> 7.5    SUBJECTIVE: Patient seen and examined at bedside.     ROS: All negative except as listed above.    VITAL SIGNS:  ICU Vital Signs Last 24 Hrs  T(C): 36 (11 Sep 2024 07:00), Max: 36.6 (10 Sep 2024 19:00)  T(F): 96.8 (11 Sep 2024 07:00), Max: 97.9 (10 Sep 2024 19:00)  HR: 79 (11 Sep 2024 07:) (76 - 123)  BP: 84/52 (11 Sep 2024 07:) (79/51 - 100/58)  BP(mean): 64 (11 Sep 2024 07:) (60 - 76)  ABP: --  ABP(mean): --  RR: 20 (11 Sep 2024 07:) (12 - 27)  SpO2: 99% (11 Sep 2024 07:) (93% - 99%)    O2 Parameters below as of 11 Sep 2024 07:  Patient On (Oxygen Delivery Method): room air            Plateau pressure:   P/F ratio:     09-10 @ 07:  -   @ 07:00  --------------------------------------------------------  IN: 1640.2 mL / OUT: 4145 mL / NET: -2504.8 mL     @ 07:  -   @ 07:40  --------------------------------------------------------  IN: 16.5 mL / OUT: 20 mL / NET: -3.5 mL      CAPILLARY BLOOD GLUCOSE          ECG: reviewed.    PHYSICAL EXAM:    GENERAL: NAD, lying in bed comfortably  HEAD:  Atraumatic, normocephalic  EYES: EOMI, PERRLA, conjunctiva and sclera clear  NECK: Supple, trachea midline, no JVD  HEART: Regular rate and rhythm, no murmurs, rubs, or gallops  LUNGS: Unlabored respirations.  Clear to auscultation bilaterally, no crackles, wheezing, or rhonchi  ABDOMEN: Soft, nontender, nondistended, +BS  EXTREMITIES: 2+ peripheral pulses bilaterally, cap refill<2 secs. No clubbing, cyanosis, or edema  NERVOUS SYSTEM:  A&Ox3, following commands, moving all extremities, no focal deficits   SKIN: No rashes or lesions    MEDICATIONS:  MEDICATIONS  (STANDING):  aspirin enteric coated 81 milliGRAM(s) Oral daily  cefepime   IVPB      cefepime   IVPB 1000 milliGRAM(s) IV Intermittent every 12 hours  chlorhexidine 2% Cloths 1 Application(s) Topical daily  clopidogrel Tablet 75 milliGRAM(s) Oral daily  DOBUTamine Infusion 10 MICROgram(s)/kG/Min (11.5 mL/Hr) IV Continuous <Continuous>  melatonin 5 milliGRAM(s) Oral at bedtime  mupirocin 2% Nasal 1 Application(s) Both Nostrils two times a day  polyethylene glycol 3350 17 Gram(s) Oral daily  senna 2 Tablet(s) Oral at bedtime    MEDICATIONS  (PRN):      ALLERGIES:  Allergies    No Known Allergies    Intolerances    atorvastatin (Muscle Pain (Severe))      LABS:                        8.4    5.61  )-----------( 68       ( 11 Sep 2024 00:30 )             24.9         132<L>  |  91<L>  |  97<H>  ----------------------------<  100<H>  3.4<L>   |  23  |  4.01<H>    Ca    8.3<L>      11 Sep 2024 05:33  Phos  4.9       Mg     2.2         TPro  6.0  /  Alb  2.7<L>  /  TBili  2.1<H>  /  DBili  x   /  AST  55<H>  /  ALT  28  /  AlkPhos  229<H>      PTT - ( 11 Sep 2024 00:30 )  PTT:31.0 sec  Urinalysis Basic - ( 11 Sep 2024 05:33 )    Color: x / Appearance: x / SG: x / pH: x  Gluc: 100 mg/dL / Ketone: x  / Bili: x / Urobili: x   Blood: x / Protein: x / Nitrite: x   Leuk Esterase: x / RBC: x / WBC x   Sq Epi: x / Non Sq Epi: x / Bacteria: x      ABG:      vBG:  pH, Venous: 7.44 (24 @ 00:23)  pCO2, Venous: 40 mmHg (24 @ 00:23)  pO2, Venous: 36 mmHg (24 @ 00:23)  HCO3, Venous: 27 mmol/L (24 @ 00:23)    Micro:    Culture - Blood (collected 24 @ 16:30)  Source: .Blood Blood  Preliminary Report (09-10-24 @ 22:01):    No growth at 72 Hours    Culture - Blood (collected 24 @ 16:15)  Source: .Blood Blood  Preliminary Report (09-10-24 @ 22:01):    No growth at 72 Hours          RADIOLOGY & ADDITIONAL TESTS: Reviewed.   INTERVAL HPI/OVERNIGHT EVENTS: yesterday  was decreased 10 --> 7.5, patient could not tolerate and was increased back to 10mg    SUBJECTIVE: Patient seen and examined at bedside. He feels well, denies CP, SOB, palpitations, N/V.    ROS: All negative except as listed above.    VITAL SIGNS:  ICU Vital Signs Last 24 Hrs  T(C): 36 (11 Sep 2024 07:00), Max: 36.6 (10 Sep 2024 19:00)  T(F): 96.8 (11 Sep 2024 07:), Max: 97.9 (10 Sep 2024 19:00)  HR: 79 (11 Sep 2024 07:) (76 - 123)  BP: 84/52 (11 Sep 2024 07:) (79/51 - 100/58)  BP(mean): 64 (11 Sep 2024 07:) (60 - 76)  ABP: --  ABP(mean): --  RR: 20 (11 Sep 2024 07:) (12 - 27)  SpO2: 99% (11 Sep 2024 07:00) (93% - 99%)    O2 Parameters below as of 11 Sep 2024 07:  Patient On (Oxygen Delivery Method): room air            Plateau pressure:   P/F ratio:     09-10 @ 07:  -   @ 07:00  --------------------------------------------------------  IN: 1640.2 mL / OUT: 4145 mL / NET: -2504.8 mL     @ 07:  -   @ 07:40  --------------------------------------------------------  IN: 16.5 mL / OUT: 20 mL / NET: -3.5 mL      CAPILLARY BLOOD GLUCOSE          ECG: reviewed.    PHYSICAL EXAM:    GENERAL: NAD, lying in bed comfortably  HEAD:  Atraumatic, normocephalic  EYES: EOMI, PERRLA, conjunctiva and sclera clear  NECK: Supple, trachea midline, no JVD  HEART: Regular rate and rhythm, no murmurs, rubs, or gallops  LUNGS: Unlabored respirations.  Clear to auscultation bilaterally, no crackles, wheezing, or rhonchi  ABDOMEN: Soft, nontender, nondistended, +BS  EXTREMITIES: 2+ peripheral pulses bilaterally, cap refill<2 secs. No clubbing, cyanosis, or edema  NERVOUS SYSTEM:  A&Ox3, following commands, moving all extremities, no focal deficits   Skin: purpuric papules on back     MEDICATIONS:  MEDICATIONS  (STANDING):  aspirin enteric coated 81 milliGRAM(s) Oral daily  cefepime   IVPB      cefepime   IVPB 1000 milliGRAM(s) IV Intermittent every 12 hours  chlorhexidine 2% Cloths 1 Application(s) Topical daily  clopidogrel Tablet 75 milliGRAM(s) Oral daily  DOBUTamine Infusion 10 MICROgram(s)/kG/Min (11.5 mL/Hr) IV Continuous <Continuous>  melatonin 5 milliGRAM(s) Oral at bedtime  mupirocin 2% Nasal 1 Application(s) Both Nostrils two times a day  polyethylene glycol 3350 17 Gram(s) Oral daily  senna 2 Tablet(s) Oral at bedtime    MEDICATIONS  (PRN):      ALLERGIES:  Allergies    No Known Allergies    Intolerances    atorvastatin (Muscle Pain (Severe))      LABS:                        8.4    5.61  )-----------( 68       ( 11 Sep 2024 00:30 )             24.9     -    132<L>  |  91<L>  |  97<H>  ----------------------------<  100<H>  3.4<L>   |  23  |  4.01<H>    Ca    8.3<L>      11 Sep 2024 05:33  Phos  4.9       Mg     2.2         TPro  6.0  /  Alb  2.7<L>  /  TBili  2.1<H>  /  DBili  x   /  AST  55<H>  /  ALT  28  /  AlkPhos  229<H>      PTT - ( 11 Sep 2024 00:30 )  PTT:31.0 sec  Urinalysis Basic - ( 11 Sep 2024 05:33 )    Color: x / Appearance: x / SG: x / pH: x  Gluc: 100 mg/dL / Ketone: x  / Bili: x / Urobili: x   Blood: x / Protein: x / Nitrite: x   Leuk Esterase: x / RBC: x / WBC x   Sq Epi: x / Non Sq Epi: x / Bacteria: x      ABG:      vBG:  pH, Venous: 7.44 (24 @ 00:23)  pCO2, Venous: 40 mmHg (24 @ 00:23)  pO2, Venous: 36 mmHg (24 @ 00:23)  HCO3, Venous: 27 mmol/L (24 @ 00:23)    Micro:    Culture - Blood (collected 24 @ 16:30)  Source: .Blood Blood  Preliminary Report (09-10-24 @ 22:01):    No growth at 72 Hours    Culture - Blood (collected 24 @ 16:15)  Source: .Blood Blood  Preliminary Report (09-10-24 @ 22:01):    No growth at 72 Hours          RADIOLOGY & ADDITIONAL TESTS: Reviewed.

## 2024-09-11 NOTE — DIETITIAN INITIAL EVALUATION ADULT - REASON INDICATOR FOR ASSESSMENT
Length of stay  Source: chart, patient, patient's wife at bedside (most of information from patient's wife)  Chart reviewed, events noted

## 2024-09-11 NOTE — DIETITIAN INITIAL EVALUATION ADULT - OTHER CALCULATIONS
Defer fluid needs to team. Calculations accounting for factor of acute decompensated heart failure per chart and malnutrition

## 2024-09-11 NOTE — CONSULT NOTE ADULT - SUBJECTIVE AND OBJECTIVE BOX
HPI:  79-year-old male with a history of HFrEF on home dobutamine 7.5 with last EF less than 20%, CAD, lungs mass - SCC, and urinary retention presented transferred from [facility] for management of heart failure after having syncopal episode last night.  Patient also having reduced urine output despite 6 mg of Bumex twice a day.  At [facility] patient had a workup which was notable for sodium of 124, creatinine to 4.6 from his baseline of approximately 2-2.1, proBNP 5220, lactate 1.9.  He was transferred here for further evaluation and  heart failure management, admitted to CICU.    On admission to CICU he is A&O, saturating well on room air, sinus rhythm 75 /59. He denies dizziness, chest pain, sob, N&V on admission. (07 Sep 2024 23:06)    Derm HPI  Dermatology consulted for purpuric patches on the back, unspecified timeline. Not painful or itchy. Patient seen at bedside, per wife, endorses probable 9-10 day history of these spots on the back. Now noting them more on anterior upper chest area. Patient denies abdominal pain, eye pain, headache, vision difficulty, oral ulcers.       PAST MEDICAL & SURGICAL HISTORY:  BPH (benign prostatic hyperplasia)  Bipolar disorder  Depression  Anxiety  Umbilical hernia, incarcerated  Constipation  Urinary retention  CAD (coronary artery disease)  SCC (squamous cell carcinoma)  Lung mass  CHF, chronic  Heart failure with reduced ejection fraction  History of arthroscopy of knee  Right, 1987  History of tonsillectomy  1952  History of hernia repair  H/O coronary angiogram          REVIEW OF SYSTEMS:  General: no fevers/chills; no lethargy  Skin/Breast: see HPI  Ophthalmologic: no eye pain or changes in vision  ENT: no dysphagia or changes in hearing  Respiratory: no SOB or cough  Cardiovascular: no palpitations or chest pain  Gastrointestinal: no abdominal pain or blood in stool  Genitourinary: no dysuria or frequency  Musculoskeletal: no joint pains or weakness  Neurological: no weakness, numbness, or tingling    MEDICATIONS  (STANDING):  aspirin enteric coated 81 milliGRAM(s) Oral daily  cefepime   IVPB      cefepime   IVPB 1000 milliGRAM(s) IV Intermittent every 12 hours  chlorhexidine 2% Cloths 1 Application(s) Topical daily  clopidogrel Tablet 75 milliGRAM(s) Oral daily  DOBUTamine Infusion 7.5 MICROgram(s)/kG/Min (8.6 mL/Hr) IV Continuous <Continuous>  melatonin 5 milliGRAM(s) Oral at bedtime  mupirocin 2% Nasal 1 Application(s) Both Nostrils two times a day  polyethylene glycol 3350 17 Gram(s) Oral daily  senna 2 Tablet(s) Oral at bedtime    MEDICATIONS  (PRN):      Allergies    No Known Allergies    Intolerances    atorvastatin (Muscle Pain (Severe))      SOCIAL HISTORY:     hx smoking  non-smoker    FAMILY HISTORY:  Family history of depression (Mother)    FH: CAD (coronary artery disease) (Sibling, Sibling)    Family history of diabetes mellitus (DM) (Sibling)      noncontributory    Vital Signs Last 24 Hrs  T(C): 36.1 (11 Sep 2024 15:00), Max: 36.6 (10 Sep 2024 19:00)  T(F): 97 (11 Sep 2024 15:00), Max: 97.9 (10 Sep 2024 19:00)  HR: 77 (11 Sep 2024 15:00) (76 - 106)  BP: 81/50 (11 Sep 2024 15:00) (74/48 - 99/51)  BP(mean): 60 (11 Sep 2024 15:00) (55 - 72)  RR: 19 (11 Sep 2024 15:00) (12 - 27)  SpO2: 96% (11 Sep 2024 15:00) (93% - 99%)    Parameters below as of 11 Sep 2024 15:00  Patient On (Oxygen Delivery Method): room air        PHYSICAL EXAM:  The patient was in NAD.  OP showed no ulcerations.  There was no visible LAD.  Conjunctiva were non-injected.  There was no clubbing or edema of extremities.   The scalp, hair, face, eyebrows, lips, OP, neck, chest, back, extremities x4, and nails were examined.  There was no hyperhidrosis or bromhidrosis.     Of note on skin exam: discrete and scattered purpuric macules and patches on the back. Large purpuric patch left flank. Left forearm with large purplish patch. Small purpuric macules anterior neck.    LABS:                        8.4    5.61  )-----------( 68       ( 11 Sep 2024 00:30 )             24.9     09-11    127<L>  |  89<L>  |  92<H>  ----------------------------<  139<H>  3.5   |  23  |  3.92<H>    Ca    8.1<L>      11 Sep 2024 14:58  Phos  4.9     09-11  Mg     2.2     09-11    TPro  5.9<L>  /  Alb  2.6<L>  /  TBili  1.9<H>  /  DBili  x   /  AST  55<H>  /  ALT  23  /  AlkPhos  223<H>  09-11    PTT - ( 11 Sep 2024 00:30 )  PTT:31.0 sec  Urinalysis Basic - ( 11 Sep 2024 14:58 )    Color: x / Appearance: x / SG: x / pH: x  Gluc: 139 mg/dL / Ketone: x  / Bili: x / Urobili: x   Blood: x / Protein: x / Nitrite: x   Leuk Esterase: x / RBC: x / WBC x   Sq Epi: x / Non Sq Epi: x / Bacteria: x        RADIOLOGY & ADDITIONAL STUDIES: reviewed; no pertinent findings

## 2024-09-11 NOTE — DIETITIAN INITIAL EVALUATION ADULT - PERTINENT LABORATORY DATA
09-11    127<L>  |  89<L>  |  92<H>  ----------------------------<  139<H>  3.5   |  23  |  3.92<H>    Ca    8.1<L>      11 Sep 2024 14:58  Phos  4.9     09-11  Mg     2.2     09-11    TPro  5.9<L>  /  Alb  2.6<L>  /  TBili  1.9<H>  /  DBili  x   /  AST  55<H>  /  ALT  23  /  AlkPhos  223<H>  09-11  A1C with Estimated Average Glucose Result: 5.8 % (09-07-24 @ 22:57)

## 2024-09-11 NOTE — DIETITIAN INITIAL EVALUATION ADULT - ENERGY INTAKE
Patient reported w/ strong return of appetite during admission compared to recently at home, making their own meal selections. Patient's wife amenable for patient to trial Ensure shakes during admission for extra caloric/protein intake. Will recommend to start with Nepro Shakes due to current phosphorus trend, and will recommend change to Ensure Plus HP when phosphorus remains WNL. Adequate (%)

## 2024-09-11 NOTE — PROGRESS NOTE ADULT - ASSESSMENT
INCOMPLETE NOTE    79-year-old male with a history of HFrEF on home dobutamine 7.5 with last EF less than 20%, CAD, lungs mass - SCC, and urinary retention presented transferred [facility] clinical hospital for management failure team after having syncopal episode last night, admitted for cardiogenic shock.    # Cardiogenic Shock in the setting of End stage HF on home dobutamine@7.5  # BRODY on CKD (cardiorenal Syndrome)  # Chronic Anemia   # Hx of 3VD  - s/p pRBC yesterday  - warm and well perfused; euvolemic on exam  - Hgb 7.4 --> plan for 1U pRBCS transfusion  - Cr (baseline 2.4): 4.15 --> 4.2 --> 4.01  - CO/CI:   - started on milrione   - wean down dobutamine to 7.5  - c/w ASA, Plavix and statin   - abx as per primary team    - GDMT on hold   - strict I&O, trend renal function daily  - not candidate for advance HF therapies     *******INCOMPLETE NOTE**************

## 2024-09-11 NOTE — DIETITIAN INITIAL EVALUATION ADULT - NSFNSNUTRHOMESUPPLEMENTFT_GEN_A_CORE
Was set to trial Ensure shakes at home that they ordered from [employer] but current admissions precluded trying them, patient takes vitamin D3 and cranberry capsules at home

## 2024-09-11 NOTE — DIETITIAN INITIAL EVALUATION ADULT - PERTINENT MEDS FT
MEDICATIONS  (STANDING):  aspirin enteric coated 81 milliGRAM(s) Oral daily  buMETAnide 2 milliGRAM(s) Oral <User Schedule>  cefepime   IVPB      cefepime   IVPB 1000 milliGRAM(s) IV Intermittent every 12 hours  chlorhexidine 2% Cloths 1 Application(s) Topical daily  clopidogrel Tablet 75 milliGRAM(s) Oral daily  DOBUTamine Infusion 7.5 MICROgram(s)/kG/Min (8.6 mL/Hr) IV Continuous <Continuous>  melatonin 5 milliGRAM(s) Oral at bedtime  mupirocin 2% Nasal 1 Application(s) Both Nostrils two times a day  polyethylene glycol 3350 17 Gram(s) Oral daily  potassium chloride    Tablet ER 40 milliEquivalent(s) Oral once  senna 2 Tablet(s) Oral at bedtime    MEDICATIONS  (PRN):

## 2024-09-12 NOTE — PROGRESS NOTE ADULT - SUBJECTIVE AND OBJECTIVE BOX
Seen in CCU, on RA     Vital Signs Last 24 Hrs  T(C): 36.7 (09-12-24 @ 19:00), Max: 36.7 (09-12-24 @ 04:00)  T(F): 98.1 (09-12-24 @ 19:00), Max: 98.1 (09-12-24 @ 04:00)  HR: 81 (09-12-24 @ 21:00) (74 - 105)  BP: 103/56 (09-12-24 @ 21:00) (67/35 - 109/56)  BP(mean): 74 (09-12-24 @ 21:00) (45 - 82)  RR: 23 (09-12-24 @ 21:00) (14 - 37)  SpO2: 96% (09-12-24 @ 21:00) (85% - 100%)    I&O's Detail    11 Sep 2024 07:01  -  12 Sep 2024 07:00  --------------------------------------------------------  IN:    DOBUTamine: 57.5 mL    DOBUTamine: 162.7 mL    IV PiggyBack: 355 mL    Milrinone: 22.8 mL    Oral Fluid: 600 mL    Vasopressin: 12.8 mL  Total IN: 1210.8 mL    OUT:    Indwelling Catheter - Urethral (mL): 2445 mL  Total OUT: 2445 mL    12 Sep 2024 07:01  -  12 Sep 2024 21:29  --------------------------------------------------------  IN:    DOBUTamine: 120.4 mL    IV PiggyBack: 150 mL    Oral Fluid: 240 mL    Vasopressin: 66.1 mL  Total IN: 576.5 mL    OUT:    Indwelling Catheter - Urethral (mL): 1940 mL  Total OUT: 1940 mL    Respiratory b/l air entry  Cardiovascular S1 S2  Gastrointestinal soft, ND  Extremities no edema                                      7.5    3.64  )-----------( 60       ( 12 Sep 2024 15:56 )             22.7     12 Sep 2024 15:56    131    |  91     |  95     ----------------------------<  135    3.6     |  20     |  3.84     Ca    8.4        12 Sep 2024 15:56  Phos  4.5       12 Sep 2024 08:16  Mg     2.0       12 Sep 2024 08:16    TPro  6.0    /  Alb  2.8    /  TBili  2.3    /  DBili  x      /  AST  52     /  ALT  26     /  AlkPhos  220    12 Sep 2024 15:56    LIVER FUNCTIONS - ( 12 Sep 2024 15:56 )  Alb: 2.8 g/dL / Pro: 6.0 g/dL / ALK PHOS: 220 U/L / ALT: 26 U/L / AST: 52 U/L / GGT: x           PT/INR - ( 12 Sep 2024 00:25 )   PT: 12.4 sec;   INR: 1.13 ratio      acetaminophen   IVPB .. 1000 milliGRAM(s) IV Intermittent once  aspirin enteric coated 81 milliGRAM(s) Oral daily  buMETAnide 2 milliGRAM(s) Oral <User Schedule>  chlorhexidine 2% Cloths 1 Application(s) Topical daily  clopidogrel Tablet 75 milliGRAM(s) Oral daily  DOBUTamine Infusion 7.5 MICROgram(s)/kG/Min IV Continuous <Continuous>  melatonin 5 milliGRAM(s) Oral at bedtime  mupirocin 2% Nasal 1 Application(s) Both Nostrils two times a day  polyethylene glycol 3350 17 Gram(s) Oral daily  senna 2 Tablet(s) Oral at bedtime  vasopressin Infusion 0.02 Unit(s)/Min IV Continuous <Continuous>    Assessment and Recommendation:     CM, EF ~ 20 - 25%, mod MR, lung ca  Cardio-renal BRODY/CKD 3/4 (baseline Cr ~ 2.)   SONO w/mild R hydro   Good UO  Borderline BP   Diuresis, hemodynamic support per CCU team  Cr is slowly downtrending   Avoid nephrotoxins  F/u BMP, UO  Hoping to avoid the need for further RRT  Prognosis is guarded overall    [phone]

## 2024-09-12 NOTE — PROGRESS NOTE ADULT - ATTENDING COMMENTS
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 at 7.5  Admitted with worsening cardiogenic shock  A+Ox3  Cardiogenic shock requiring high-dose Dobutamine - will wean to outpatient dose  Attempted to transition to Milrinone but patient became hypotensive  O2 sats mid 90s on nasal cannula - wean to room air  DASH diet  Non-oliguric BRODY, volume status has improved - decrease diuresis to target 500 cc overall negative  H/H low but acceptable s/p PRBC transfusion  Holding pharmacologic DVT prophylaxis given thrombocytopenia   Afebrile, on empiric Cefepime for concern for pneumonia - follow up cultures  Sugars controlled  RIJ Cordcristóbal 9/7  Poor prognosis - now DNR/DNI again. Palliative continues to follow. 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 Palliative continues to follow.

## 2024-09-12 NOTE — PROGRESS NOTE ADULT - SUBJECTIVE AND OBJECTIVE BOX
INTERVAL HPI/OVERNIGHT EVENTS:    SUBJECTIVE: Patient seen and examined at bedside.     ROS: All negative except as listed above.    VITAL SIGNS:  ICU Vital Signs Last 24 Hrs  T(C): 36.7 (12 Sep 2024 04:00), Max: 36.7 (12 Sep 2024 04:00)  T(F): 98.1 (12 Sep 2024 04:00), Max: 98.1 (12 Sep 2024 04:00)  HR: 83 (12 Sep 2024 06:45) (74 - 104)  BP: 92/50 (12 Sep 2024 06:45) (74/43 - 99/58)  BP(mean): 65 (12 Sep 2024 06:45) (54 - 75)  ABP: --  ABP(mean): --  RR: 26 (12 Sep 2024 06:45) (15 - 28)  SpO2: 97% (12 Sep 2024 06:45) (93% - 100%)    O2 Parameters below as of 12 Sep 2024 06:00  Patient On (Oxygen Delivery Method): room air            Plateau pressure:   P/F ratio:     09-11 @ 07:01  -  09-12 @ 07:00  --------------------------------------------------------  IN: 1210.8 mL / OUT: 2445 mL / NET: -1234.2 mL      CAPILLARY BLOOD GLUCOSE          ECG: reviewed.    PHYSICAL EXAM:    GENERAL: NAD, lying in bed comfortably  HEAD:  Atraumatic, normocephalic  EYES: EOMI, PERRLA, conjunctiva and sclera clear  NECK: Supple, trachea midline, no JVD  HEART: Regular rate and rhythm, no murmurs, rubs, or gallops  LUNGS: Unlabored respirations.  Clear to auscultation bilaterally, no crackles, wheezing, or rhonchi  ABDOMEN: Soft, nontender, nondistended, +BS  EXTREMITIES: 2+ peripheral pulses bilaterally, cap refill<2 secs. No clubbing, cyanosis, or edema  NERVOUS SYSTEM:  A&Ox3, following commands, moving all extremities, no focal deficits   SKIN: No rashes or lesions    MEDICATIONS:  MEDICATIONS  (STANDING):  aspirin enteric coated 81 milliGRAM(s) Oral daily  buMETAnide 2 milliGRAM(s) Oral <User Schedule>  cefepime   IVPB 1000 milliGRAM(s) IV Intermittent every 12 hours  cefepime   IVPB      chlorhexidine 2% Cloths 1 Application(s) Topical daily  clopidogrel Tablet 75 milliGRAM(s) Oral daily  DOBUTamine Infusion 7.5 MICROgram(s)/kG/Min (8.6 mL/Hr) IV Continuous <Continuous>  melatonin 5 milliGRAM(s) Oral at bedtime  mupirocin 2% Nasal 1 Application(s) Both Nostrils two times a day  polyethylene glycol 3350 17 Gram(s) Oral daily  senna 2 Tablet(s) Oral at bedtime  vasopressin Infusion 0.02 Unit(s)/Min (3 mL/Hr) IV Continuous <Continuous>    MEDICATIONS  (PRN):      ALLERGIES:  Allergies    No Known Allergies    Intolerances    atorvastatin (Muscle Pain (Severe))      LABS:                        7.5    4.63  )-----------( 63       ( 12 Sep 2024 00:25 )             23.0     09-12    129<L>  |  87<L>  |  95<H>  ----------------------------<  99  3.6   |  23  |  3.92<H>    Ca    8.2<L>      12 Sep 2024 05:48  Phos  4.5     09-12  Mg     2.1     09-12    TPro  6.2  /  Alb  2.8<L>  /  TBili  2.1<H>  /  DBili  x   /  AST  56<H>  /  ALT  26  /  AlkPhos  224<H>  09-12    PT/INR - ( 12 Sep 2024 00:25 )   PT: 12.4 sec;   INR: 1.13 ratio         PTT - ( 12 Sep 2024 00:25 )  PTT:30.2 sec  Urinalysis Basic - ( 12 Sep 2024 05:48 )    Color: x / Appearance: x / SG: x / pH: x  Gluc: 99 mg/dL / Ketone: x  / Bili: x / Urobili: x   Blood: x / Protein: x / Nitrite: x   Leuk Esterase: x / RBC: x / WBC x   Sq Epi: x / Non Sq Epi: x / Bacteria: x      ABG:      vBG:  pH, Venous: 7.49 (09-12-24 @ 00:02)  pCO2, Venous: 36 mmHg (09-12-24 @ 00:02)  pO2, Venous: 33 mmHg (09-12-24 @ 00:02)  HCO3, Venous: 27 mmol/L (09-12-24 @ 00:02)  pH, Venous: 7.42 (09-11-24 @ 20:43)  pCO2, Venous: 40 mmHg (09-11-24 @ 20:43)  pO2, Venous: 32 mmHg (09-11-24 @ 20:43)  HCO3, Venous: 26 mmol/L (09-11-24 @ 20:43)  pH, Venous: 7.44 (09-11-24 @ 14:55)  pCO2, Venous: 40 mmHg (09-11-24 @ 14:55)  pO2, Venous: 33 mmHg (09-11-24 @ 14:55)  HCO3, Venous: 27 mmol/L (09-11-24 @ 14:55)    Micro:    Culture - Blood (collected 09-07-24 @ 16:30)  Source: .Blood Blood  Preliminary Report (09-11-24 @ 22:00):    No growth at 4 days    Culture - Blood (collected 09-07-24 @ 16:15)  Source: .Blood Blood  Preliminary Report (09-11-24 @ 22:00):    No growth at 4 days          RADIOLOGY & ADDITIONAL TESTS: Reviewed.   INTERVAL HPI/OVERNIGHT EVENTS:  was decreased 10 --> 7.5. Patient become hypotensive, started on vasopressin.    SUBJECTIVE: Patient seen and examined at bedside. Appears lethargic, Aox2. Denies chest pain, SOB, palpitations, N/V.    ROS: All negative except as listed above.    VITAL SIGNS:  ICU Vital Signs Last 24 Hrs  T(C): 36.7 (12 Sep 2024 04:00), Max: 36.7 (12 Sep 2024 04:00)  T(F): 98.1 (12 Sep 2024 04:00), Max: 98.1 (12 Sep 2024 04:00)  HR: 83 (12 Sep 2024 06:45) (74 - 104)  BP: 92/50 (12 Sep 2024 06:45) (74/43 - 99/58)  BP(mean): 65 (12 Sep 2024 06:45) (54 - 75)  ABP: --  ABP(mean): --  RR: 26 (12 Sep 2024 06:45) (15 - 28)  SpO2: 97% (12 Sep 2024 06:45) (93% - 100%)    O2 Parameters below as of 12 Sep 2024 06:00  Patient On (Oxygen Delivery Method): room air            Plateau pressure:   P/F ratio:      @ 07:01  -  - @ 07:00  --------------------------------------------------------  IN: 1210.8 mL / OUT: 2445 mL / NET: -1234.2 mL      CAPILLARY BLOOD GLUCOSE          ECG: reviewed.    PHYSICAL EXAM:    GENERAL: NAD, lying in bed comfortably  HEAD:  Atraumatic, normocephalic  EYES: EOMI, PERRLA, conjunctiva and sclera clear  NECK: Supple, trachea midline, no JVD  HEART: Regular rate and rhythm, no murmurs, rubs, or gallops  LUNGS: Unlabored respirations.  Clear to auscultation bilaterally,+ bibasilar crackles   ABDOMEN: Soft, nontender, nondistended, +BS  EXTREMITIES: 2+ peripheral pulses bilaterally, cap refill<2 secs. No clubbing, cyanosis, or edema  NERVOUS SYSTEM:  A&Ox3, following commands, moving all extremities, no focal deficits   SKIN: No rashes or lesions    MEDICATIONS:  MEDICATIONS  (STANDING):  aspirin enteric coated 81 milliGRAM(s) Oral daily  buMETAnide 2 milliGRAM(s) Oral <User Schedule>  cefepime   IVPB 1000 milliGRAM(s) IV Intermittent every 12 hours  cefepime   IVPB      chlorhexidine 2% Cloths 1 Application(s) Topical daily  clopidogrel Tablet 75 milliGRAM(s) Oral daily  DOBUTamine Infusion 7.5 MICROgram(s)/kG/Min (8.6 mL/Hr) IV Continuous <Continuous>  melatonin 5 milliGRAM(s) Oral at bedtime  mupirocin 2% Nasal 1 Application(s) Both Nostrils two times a day  polyethylene glycol 3350 17 Gram(s) Oral daily  senna 2 Tablet(s) Oral at bedtime  vasopressin Infusion 0.02 Unit(s)/Min (3 mL/Hr) IV Continuous <Continuous>    MEDICATIONS  (PRN):      ALLERGIES:  Allergies    No Known Allergies    Intolerances    atorvastatin (Muscle Pain (Severe))      LABS:                        7.5    4.63  )-----------( 63       ( 12 Sep 2024 00:25 )             23.0     -    129<L>  |  87<L>  |  95<H>  ----------------------------<  99  3.6   |  23  |  3.92<H>    Ca    8.2<L>      12 Sep 2024 05:48  Phos  4.5       Mg     2.1         TPro  6.2  /  Alb  2.8<L>  /  TBili  2.1<H>  /  DBili  x   /  AST  56<H>  /  ALT  26  /  AlkPhos  224<H>  12    PT/INR - ( 12 Sep 2024 00:25 )   PT: 12.4 sec;   INR: 1.13 ratio         PTT - ( 12 Sep 2024 00:25 )  PTT:30.2 sec  Urinalysis Basic - ( 12 Sep 2024 05:48 )    Color: x / Appearance: x / SG: x / pH: x  Gluc: 99 mg/dL / Ketone: x  / Bili: x / Urobili: x   Blood: x / Protein: x / Nitrite: x   Leuk Esterase: x / RBC: x / WBC x   Sq Epi: x / Non Sq Epi: x / Bacteria: x      ABG:      vBG:  pH, Venous: 7.49 (24 @ 00:02)  pCO2, Venous: 36 mmHg (24 @ 00:02)  pO2, Venous: 33 mmHg (24 @ 00:02)  HCO3, Venous: 27 mmol/L (24 @ 00:02)  pH, Venous: 7.42 (24 @ 20:43)  pCO2, Venous: 40 mmHg (24 @ 20:43)  pO2, Venous: 32 mmHg (24 @ 20:43)  HCO3, Venous: 26 mmol/L (24 @ 20:43)  pH, Venous: 7.44 (24 @ 14:55)  pCO2, Venous: 40 mmHg (24 @ 14:55)  pO2, Venous: 33 mmHg (24 @ 14:55)  HCO3, Venous: 27 mmol/L (24 @ 14:55)    Micro:    Culture - Blood (collected 24 @ 16:30)  Source: .Blood Blood  Preliminary Report (24 @ 22:00):    No growth at 4 days    Culture - Blood (collected 24 @ 16:15)  Source: .Blood Blood  Preliminary Report (24 @ 22:00):    No growth at 4 days          RADIOLOGY & ADDITIONAL TESTS: Reviewed.

## 2024-09-12 NOTE — PROGRESS NOTE ADULT - ASSESSMENT
79-year-old male with a history of HFrEF on home dobutamine 7.5 with last EF less than 20%, CAD, lungs mass - SCC, and urinary retention presented transferred from clinical hospital for management failure team after having syncopal episode last night, admitted for cardiogenic shock.    Neuro  - A&0 x3  - CT head post syncopal episode with no acute injury  - continue to monitor mental status per protocol    Respiratory  #hx lung mass, SCC c/b fluid overload  - Required CPAP 10/5 50% weaned off to NC 2 L; now RA  - saturating well   - continue to monitor spO2    Cardiovascular  #HFrEF (last know EF%), acute decompensated heart failure  - on home dobutamine @ 7.5 mcg increased to 10 mcg given low Venous; now will decrease to 7.5mg  - Vasopressin 0.02 MAP goal 65-70  -2 Bumex PO TIF  - Unable to float swan however has a cordis for monitoring of venous sats  - borderline BPs without room for afterload reduction  - holding GDMT i/s/o shock  - continue strict I&O  - continue to trend lactate and perfusion indices    #CAD  - on aspirin and plavix at home  - continue    Renal  BRODY on CKD  - sCr 3.92, baseline usually around 2  - likely i/s/o cardiogenic shock  - strict I&O, trend renal function daily    GI  - diet as tolerated  - monitor for BM    Endo  - f/u a1c, trend glucose on cmp  - f/u tsh and lipid profile    Heme  - Hgb 7.5, transfuse 1 U pRBCs  - platelets low  but appears chronic  - continue to trend  DVT ppx: SCDs, given thrombocytopenia    ID  - hypothermic on admission  - f/u infectious w/u  - continue to trend wbc and fever curve  - received Vanc x1; c/w cefepime empirically given hypothermia.     SKIN:  - new purpuric macules on back , with echymosis on lower back  - derm c/s, will CTM    #Ethics: DNR/ DNI    Lines: Mani, JENNIE Cordis from 9/8    ======================= DISPOSITION  =====================  [X] Critical   [ ] Guarded    [ ] Stable    [X] Maintain in CICU  [ ] Downgrade to Telemetry  [ ] Discharge Home 79-year-old male with a history of HFrEF on home dobutamine 7.5 with last EF less than 20%, CAD, lungs mass - SCC, and urinary retention presented transferred from clinical hospital for management failure team after having syncopal episode last night, admitted for cardiogenic shock.    Neuro  - A&0 x2  - CT head post syncopal episode with no acute injury  - continue to monitor mental status per protocol    Respiratory  #hx lung mass, SCC c/b fluid overload  - Required CPAP 10/5 50% weaned off to NC 2 L; now RA  - saturating well   - continue to monitor spO2    Cardiovascular  #HFrEF (last know EF%), acute decompensated heart failure  - on home dobutamine @ 7.5 mcg increased to 10 mcg given low Venous; now will decrease to 7.5mg  - Vasopressin 0.02 MAP goal 65-70  -2 Bumex PO TID, 2mg IV Push stat x 1 (CVP 25)  - Unable to float swan however has a cordis for monitoring of venous sats  - borderline BPs without room for afterload reduction  - holding GDMT i/s/o shock  - continue strict I&O  - continue to trend lactate and perfusion indices    #CAD  - on aspirin and plavix at home  - continue    Renal  BRODY on CKD  - sCr 3.92, baseline usually around 2  - likely i/s/o cardiogenic shock  - strict I&O, trend renal function daily    GI  - diet as tolerated  - monitor for BM    Endo  - f/u a1c, trend glucose on cmp  - f/u tsh and lipid profile    Heme  - Hgb 7.5, likelt 2/2 to volume overload, will ctm  - platelets low  but appears chronic  - continue to trend  DVT ppx: SCDs, given thrombocytopenia    ID  - hypothermic on admission  - f/u infectious w/u  - continue to trend wbc and fever curve  - received Vanc x1; c/w cefepime empirically given hypothermia.     SKIN:  - new purpuric macules on back , with echymosis on lower back  - derm c/s, will CTM    #Ethics: DNR/ DNI    Lines: PIVs, RIJ Cordis from 9/8    ======================= DISPOSITION  =====================  [X] Critical   [ ] Guarded    [ ] Stable    [X] Maintain in CICU  [ ] Downgrade to Telemetry  [ ] Discharge Home 79-year-old male with a history of HFrEF on home dobutamine 7.5 with last EF less than 20%, CAD, lungs mass - SCC, and urinary retention presented transferred from clinical hospital for management failure team after having syncopal episode last night, admitted for cardiogenic shock.    Neuro  - A&0 x2  - CT head post syncopal episode with no acute injury  - continue to monitor mental status per protocol    Respiratory  #hx lung mass, SCC c/b fluid overload  - Required CPAP 10/5 50% weaned off to NC 2 L; now RA  - saturating well   - continue to monitor spO2    Cardiovascular  #HFrEF (last know EF%), acute decompensated heart failure  - on home dobutamine @ 7.5 mcg increased to 10 mcg given low Venous; now will decrease to 7.5mg  - Vasopressin 0.02 MAP goal 65-70  -2 Bumex PO TID, 2mg IV Push stat x 1 (CVP 25)  - Unable to float swan however has a cordis for monitoring of venous sats  - borderline BPs without room for afterload reduction  - holding GDMT i/s/o shock  - continue strict I&O  - continue to trend lactate and perfusion indices    #CAD  - on aspirin and plavix at home  - continue    Renal  BRODY on CKD  - sCr 3.89 downtrending, baseline usually around 2  - likely i/s/o cardiogenic shock  - strict I&O, trend renal function daily    GI  - diet as tolerated  - monitor for BM    Endo  - f/u a1c, trend glucose on cmp  - f/u tsh and lipid profile    Heme  - Hgb 7.5, likelt 2/2 to volume overload, will ctm  - platelets low  but appears chronic  - continue to trend  DVT ppx: SCDs, given thrombocytopenia    ID  - hypothermic on admission  - f/u infectious w/u  - continue to trend wbc and fever curve  - received Vanc x1; c/w cefepime empirically given hypothermia.     SKIN:  - new purpuric macules on back , with echymosis on lower back  - derm c/s, will CTM    #Ethics: DNR/ DNI    Lines: PIVs, RIJ Cordis from 9/8    ======================= DISPOSITION  =====================  [X] Critical   [ ] Guarded    [ ] Stable    [X] Maintain in CICU  [ ] Downgrade to Telemetry  [ ] Discharge Home

## 2024-09-12 NOTE — PROGRESS NOTE ADULT - SUBJECTIVE AND OBJECTIVE BOX
ELYSE MALAVE  MRN-[id number]  Patient is a 79y old  Male who presents with a chief complaint of cardiogenic shock (12 Sep 2024 07:56)    HPI:  79-year-old male with a history of HFrEF on home dobutamine 7.5 with last EF less than 20%, CAD, lungs mass - SCC, and urinary retention presented transferred from [facility] for management of heart failure after having syncopal episode last night.  Patient also having reduced urine output despite 6 mg of Bumex twice a day.  At [facility] patient had a workup which was notable for sodium of 124, creatinine to 4.6 from his baseline of approximately 2-2.1, proBNP 5220, lactate 1.9.  He was transferred here for further evaluation and  heart failure management, admitted to CICU.    On admission to CICU he is A&O, saturating well on room air, sinus rhythm 75 /59. He denies dizziness, chest pain, sob, N&V on admission. (07 Sep 2024 23:06)      Hospital Course:    24 HOUR EVENTS:    REVIEW OF SYSTEMS:  All negative except as listed above.      ICU Vital Signs Last 24 Hrs  T(C): 36.7 (12 Sep 2024 19:00), Max: 36.7 (12 Sep 2024 04:00)  T(F): 98.1 (12 Sep 2024 19:00), Max: 98.1 (12 Sep 2024 04:00)  HR: 83 (12 Sep 2024 20:30) (74 - 105)  BP: 103/51 (12 Sep 2024 20:30) (67/35 - 109/56)  BP(mean): 64 (12 Sep 2024 20:30) (45 - 82)  ABP: --  ABP(mean): --  RR: 31 (12 Sep 2024 20:30) (14 - 37)  SpO2: 98% (12 Sep 2024 20:30) (85% - 100%)    O2 Parameters below as of 12 Sep 2024 20:00  Patient On (Oxygen Delivery Method): room air            CVP(mm Hg): 15 (09-12-24 @ 20:30) (4 - 25)  CO: --  CI: --  PA: --  PA(mean): --  PA(direct): --  PCWP: --  LA: --  RA: --  SVR: --  SVRI: --  PVR: --  PVRI: --  I&O's Summary    11 Sep 2024 07:01  -  12 Sep 2024 07:00  --------------------------------------------------------  IN: 1210.8 mL / OUT: 2445 mL / NET: -1234.2 mL    12 Sep 2024 07:01  -  12 Sep 2024 20:43  --------------------------------------------------------  IN: 576.5 mL / OUT: 1865 mL / NET: -1288.5 mL        CAPILLARY BLOOD GLUCOSE    CAPILLARY BLOOD GLUCOSE          PHYSICAL EXAM:  GENERAL: NAD, lying in bed comfortably  HEAD:  Atraumatic, normocephalic  EYES: EOMI, PERRLA, conjunctiva and sclera clear  NECK: Supple, trachea midline, no JVD  HEART: Regular rate and rhythm, no murmurs, rubs, or gallops  LUNGS: Unlabored respirations.  Clear to auscultation bilaterally,+ bibasilar crackles   ABDOMEN: Soft, nontender, nondistended, +BS  EXTREMITIES: 2+ peripheral pulses bilaterally, cap refill<2 secs. No clubbing, cyanosis, or edema  NERVOUS SYSTEM:  A&Ox3, following commands, moving all extremities, no focal deficits   SKIN: No rashes or lesions    ============================I/O===========================   I&O's Detail    11 Sep 2024 07:01  -  12 Sep 2024 07:00  --------------------------------------------------------  IN:    DOBUTamine: 57.5 mL    DOBUTamine: 162.7 mL    IV PiggyBack: 355 mL    Milrinone: 22.8 mL    Oral Fluid: 600 mL    Vasopressin: 12.8 mL  Total IN: 1210.8 mL    OUT:    Indwelling Catheter - Urethral (mL): 2445 mL  Total OUT: 2445 mL    Total NET: -1234.2 mL      12 Sep 2024 07:01  -  12 Sep 2024 20:43  --------------------------------------------------------  IN:    DOBUTamine: 120.4 mL    IV PiggyBack: 150 mL    Oral Fluid: 240 mL    Vasopressin: 66.1 mL  Total IN: 576.5 mL    OUT:    Indwelling Catheter - Urethral (mL): 1865 mL  Total OUT: 1865 mL    Total NET: -1288.5 mL        ============================ LABS =========================                        7.5    3.64  )-----------( 60       ( 12 Sep 2024 15:56 )             22.7     09-12    131<L>  |  91<L>  |  95<H>  ----------------------------<  135<H>  3.6   |  20<L>  |  3.84<H>    Ca    8.4      12 Sep 2024 15:56  Phos  4.5     09-12  Mg     2.0     09-12    TPro  6.0  /  Alb  2.8<L>  /  TBili  2.3<H>  /  DBili  x   /  AST  52<H>  /  ALT  26  /  AlkPhos  220<H>  09-12                LIVER FUNCTIONS - ( 12 Sep 2024 15:56 )  Alb: 2.8 g/dL / Pro: 6.0 g/dL / ALK PHOS: 220 U/L / ALT: 26 U/L / AST: 52 U/L / GGT: x           PT/INR - ( 12 Sep 2024 00:25 )   PT: 12.4 sec;   INR: 1.13 ratio         PTT - ( 12 Sep 2024 00:25 )  PTT:30.2 sec    Blood Gas Venous - Lactate: 1.7 mmol/L (09-12-24 @ 15:44)  Blood Gas Venous - Lactate: 1.4 mmol/L (09-12-24 @ 08:09)  Blood Gas Venous - Lactate: 1.4 mmol/L (09-12-24 @ 00:02)  Blood Gas Venous - Lactate: 1.7 mmol/L (09-11-24 @ 20:43)  Blood Gas Venous - Lactate: 1.6 mmol/L (09-11-24 @ 14:55)  Blood Gas Venous - Lactate: 1.0 mmol/L (09-11-24 @ 00:23)  Blood Gas Venous - Lactate: 1.5 mmol/L (09-10-24 @ 17:18)  Blood Gas Venous - Lactate: 2.2 mmol/L (09-10-24 @ 14:25)  Blood Gas Venous - Lactate: 1.4 mmol/L (09-10-24 @ 00:02)    Urinalysis Basic - ( 12 Sep 2024 15:56 )    Color: x / Appearance: x / SG: x / pH: x  Gluc: 135 mg/dL / Ketone: x  / Bili: x / Urobili: x   Blood: x / Protein: x / Nitrite: x   Leuk Esterase: x / RBC: x / WBC x   Sq Epi: x / Non Sq Epi: x / Bacteria: x      ======================Micro/Rad/Cardio=================  Telemtry: Reviewed   EKG: Reviewed  CXR: Reviewed  Culture: Reviewed   ======================================================  PAST MEDICAL & SURGICAL HISTORY:  BPH (benign prostatic hyperplasia)      Bipolar disorder      Depression      Anxiety      Umbilical hernia, incarcerated      Constipation      Urinary retention      CAD (coronary artery disease)      SCC (squamous cell carcinoma)      Lung mass      CHF, chronic      Heart failure with reduced ejection fraction      History of arthroscopy of knee  Right, 1987      History of tonsillectomy  1952      History of hernia repair      H/O coronary angiogram        ====================ASSESSMENT ==============  79-year-old male with a history of HFrEF on home dobutamine 7.5 with last EF less than 20%, CAD, lungs mass - SCC, and urinary retention presented transferred from [facility] hospital for management failure team after having syncopal episode last night, admitted for cardiogenic shock.    Plan:  ====================== NEUROLOGY=====================  - A&0 x2  - CT head post syncopal episode with no acute injury  - continue to monitor mental status per protocol    acetaminophen   IVPB .. 1000 milliGRAM(s) IV Intermittent once  melatonin 5 milliGRAM(s) Oral at bedtime    ==================== RESPIRATORY======================  #hx lung mass, SCC c/b fluid overload  - Required CPAP 10/5 50% weaned off to NC 2 L; now RA  - saturating well   - continue to monitor spO2    Mechanical Ventilation:      ====================CARDIOVASCULAR==================  #HFrEF (last know EF%), acute decompensated heart failure  - on home dobutamine @ 7.5 mcg increased to 10 mcg given low Venous; now will decrease to 7.5mg  - Vasopressin 0.02 MAP goal 65-70  -2 Bumex PO TID, 2mg IV Push stat x 1 (CVP 25)  - Unable to float swan however has a cordis for monitoring of venous sats  - borderline BPs without room for afterload reduction  - holding GDMT i/s/o shock  - continue strict I&O  - continue to trend lactate and perfusion indices    #CAD  - on aspirin and plavix at home  - continue    buMETAnide 2 milliGRAM(s) Oral <User Schedule>  DOBUTamine Infusion 7.5 MICROgram(s)/kG/Min (8.6 mL/Hr) IV Continuous <Continuous>    ===================HEMATOLOGIC/ONC ===================  - Hgb 7.5, likelt 2/2 to volume overload, will ctm  - platelets low  but appears chronic  - continue to trend  DVT ppx: SCDs, given thrombocytopenia    aspirin enteric coated 81 milliGRAM(s) Oral daily  clopidogrel Tablet 75 milliGRAM(s) Oral daily    ===================== RENAL =========================  BRODY on CKD  - sCr 3.89 downtrending, baseline usually around 2  - likely i/s/o cardiogenic shock  - strict I&O, trend renal function daily    Continue monitoring urine output    ==================== GASTROINTESTINAL===================  - diet as tolerated  - monitor for BM    polyethylene glycol 3350 17 Gram(s) Oral daily  senna 2 Tablet(s) Oral at bedtime    =======================    ENDOCRINE  =====================  - f/u a1c, trend glucose on cmp  - f/u tsh and lipid profile    vasopressin Infusion 0.02 Unit(s)/Min (3 mL/Hr) IV Continuous <Continuous>    ========================INFECTIOUS DISEASE================  - hypothermic on admission  - f/u infectious w/u  - continue to trend wbc and fever curve  - received Vanc x1; c/w cefepime empirically given hypothermia.     Patient requires continuous monitoring with bedside rhythm monitoring, pulse ox monitoring, and intermittent blood gas analysis. Care plan discussed with ICU care team. Patient remained critical and at risk for life threatening decompensation.  Patient seen, examined and plan discussed with CCU team during rounds.     I have personally provided ____ minutes of critical care time excluding time spent on separate procedures, in addition to initial critical care time provided by the CICU Attending, Dr. Mohamud.     By signing my name below, I, Daniel Colorado, attest that this documentation has been prepared under the direction and in the presence of Angle Mccoy NP  Electronically signed: Destini Lizarraga, 09-12-24 @ 20:44    I, Angle Mccoy NP, personally performed the services described in this documentation. all medical record entries made by the priceibe were at my direction and in my presence. I have reviewed the chart and agree that the record reflects my personal performance and is accurate and complete  Electronically signed: Angle Mccoy NP   ELYSE MALAVE  MRN-[id number]  Patient is a 79y old  Male who presents with a chief complaint of cardiogenic shock (12 Sep 2024 07:56)    HPI:  79-year-old male with a history of HFrEF on home dobutamine 7.5 with last EF less than 20%, CAD, lungs mass - SCC, and urinary retention presented transferred from [facility] for management of heart failure after having syncopal episode last night.  Patient also having reduced urine output despite 6 mg of Bumex twice a day.  At [facility] patient had a workup which was notable for sodium of 124, creatinine to 4.6 from his baseline of approximately 2-2.1, proBNP 5220, lactate 1.9.  He was transferred here for further evaluation and  heart failure management, admitted to CICU.    On admission to CICU he is A&O, saturating well on room air, sinus rhythm 75 /59. He denies dizziness, chest pain, sob, N&V on admission. (07 Sep 2024 23:06)    24 HOUR EVENTS:  - Dig started per HF    ICU Vital Signs Last 24 Hrs  T(C): 36.7 (12 Sep 2024 19:00), Max: 36.7 (12 Sep 2024 04:00)  T(F): 98.1 (12 Sep 2024 19:00), Max: 98.1 (12 Sep 2024 04:00)  HR: 83 (12 Sep 2024 20:30) (74 - 105)  BP: 103/51 (12 Sep 2024 20:30) (67/35 - 109/56)  BP(mean): 64 (12 Sep 2024 20:30) (45 - 82)  ABP: --  ABP(mean): --  RR: 31 (12 Sep 2024 20:30) (14 - 37)  SpO2: 98% (12 Sep 2024 20:30) (85% - 100%)    O2 Parameters below as of 12 Sep 2024 20:00  Patient On (Oxygen Delivery Method): room air    CVP(mm Hg): 15 (09-12-24 @ 20:30) (4 - 25)  CO: --  CI: --  PA: --  PA(mean): --  PA(direct): --  PCWP: --  LA: --  RA: --  SVR: --  SVRI: --  PVR: --  PVRI: --    I&O's Summary    11 Sep 2024 07:01  -  12 Sep 2024 07:00  --------------------------------------------------------  IN: 1210.8 mL / OUT: 2445 mL / NET: -1234.2 mL    12 Sep 2024 07:01  -  12 Sep 2024 20:43  --------------------------------------------------------  IN: 576.5 mL / OUT: 1865 mL / NET: -1288.5 mL    ============================I/O===========================   I&O's Detail    11 Sep 2024 07:01  -  12 Sep 2024 07:00  --------------------------------------------------------  IN:    DOBUTamine: 57.5 mL    DOBUTamine: 162.7 mL    IV PiggyBack: 355 mL    Milrinone: 22.8 mL    Oral Fluid: 600 mL    Vasopressin: 12.8 mL  Total IN: 1210.8 mL    OUT:    Indwelling Catheter - Urethral (mL): 2445 mL  Total OUT: 2445 mL    Total NET: -1234.2 mL      12 Sep 2024 07:01  -  12 Sep 2024 20:43  --------------------------------------------------------  IN:    DOBUTamine: 120.4 mL    IV PiggyBack: 150 mL    Oral Fluid: 240 mL    Vasopressin: 66.1 mL  Total IN: 576.5 mL    OUT:    Indwelling Catheter - Urethral (mL): 1865 mL  Total OUT: 1865 mL    Total NET: -1288.5 mL    =========================== LABS =========================                        7.5    3.64  )-----------( 60       ( 12 Sep 2024 15:56 )             22.7     09-12    131<L>  |  91<L>  |  95<H>  ----------------------------<  135<H>  3.6   |  20<L>  |  3.84<H>    Ca    8.4      12 Sep 2024 15:56  Phos  4.5     09-12  Mg     2.0     09-12    TPro  6.0  /  Alb  2.8<L>  /  TBili  2.3<H>  /  DBili  x   /  AST  52<H>  /  ALT  26  /  AlkPhos  220<H>  09-12    LIVER FUNCTIONS - ( 12 Sep 2024 15:56 )  Alb: 2.8 g/dL / Pro: 6.0 g/dL / ALK PHOS: 220 U/L / ALT: 26 U/L / AST: 52 U/L / GGT: x           PT/INR - ( 12 Sep 2024 00:25 )   PT: 12.4 sec;   INR: 1.13 ratio    PTT - ( 12 Sep 2024 00:25 )  PTT:30.2 sec    Blood Gas Venous - Lactate: 1.7 mmol/L (09-12-24 @ 15:44)  Blood Gas Venous - Lactate: 1.4 mmol/L (09-12-24 @ 08:09)  Blood Gas Venous - Lactate: 1.4 mmol/L (09-12-24 @ 00:02)  Blood Gas Venous - Lactate: 1.7 mmol/L (09-11-24 @ 20:43)  Blood Gas Venous - Lactate: 1.6 mmol/L (09-11-24 @ 14:55)  Blood Gas Venous - Lactate: 1.0 mmol/L (09-11-24 @ 00:23)  Blood Gas Venous - Lactate: 1.5 mmol/L (09-10-24 @ 17:18)  Blood Gas Venous - Lactate: 2.2 mmol/L (09-10-24 @ 14:25)  Blood Gas Venous - Lactate: 1.4 mmol/L (09-10-24 @ 00:02) ELYSE MALAVE  MRN-[id number]  Patient is a 79y old  Male who presents with a chief complaint of cardiogenic shock (12 Sep 2024 07:56)    HPI:  79-year-old male with a history of HFrEF on home dobutamine 7.5 with last EF less than 20%, CAD, lungs mass - SCC, and urinary retention presented transferred from [facility] for management of heart failure after having syncopal episode last night.  Patient also having reduced urine output despite 6 mg of Bumex twice a day.  At [facility] patient had a workup which was notable for sodium of 124, creatinine to 4.6 from his baseline of approximately 2-2.1, proBNP 5220, lactate 1.9.  He was transferred here for further evaluation and  heart failure management, admitted to CICU.    On admission to CICU he is A&O, saturating well on room air, sinus rhythm 75 /59. He denies dizziness, chest pain, sob, N&V on admission. (07 Sep 2024 23:06)    24 HOUR EVENTS:  - No acute events    ICU Vital Signs Last 24 Hrs  T(C): 36.7 (12 Sep 2024 19:00), Max: 36.7 (12 Sep 2024 04:00)  T(F): 98.1 (12 Sep 2024 19:00), Max: 98.1 (12 Sep 2024 04:00)  HR: 83 (12 Sep 2024 20:30) (74 - 105)  BP: 103/51 (12 Sep 2024 20:30) (67/35 - 109/56)  BP(mean): 64 (12 Sep 2024 20:30) (45 - 82)  ABP: --  ABP(mean): --  RR: 31 (12 Sep 2024 20:30) (14 - 37)  SpO2: 98% (12 Sep 2024 20:30) (85% - 100%)    O2 Parameters below as of 12 Sep 2024 20:00  Patient On (Oxygen Delivery Method): room air    CVP(mm Hg): 15 (09-12-24 @ 20:30) (4 - 25)  CO: --  CI: --  PA: --  PA(mean): --  PA(direct): --  PCWP: --  LA: --  RA: --  SVR: --  SVRI: --  PVR: --  PVRI: --    I&O's Summary    11 Sep 2024 07:01  -  12 Sep 2024 07:00  --------------------------------------------------------  IN: 1210.8 mL / OUT: 2445 mL / NET: -1234.2 mL    12 Sep 2024 07:01  -  12 Sep 2024 20:43  --------------------------------------------------------  IN: 576.5 mL / OUT: 1865 mL / NET: -1288.5 mL    ============================I/O===========================   I&O's Detail    11 Sep 2024 07:01  -  12 Sep 2024 07:00  --------------------------------------------------------  IN:    DOBUTamine: 57.5 mL    DOBUTamine: 162.7 mL    IV PiggyBack: 355 mL    Milrinone: 22.8 mL    Oral Fluid: 600 mL    Vasopressin: 12.8 mL  Total IN: 1210.8 mL    OUT:    Indwelling Catheter - Urethral (mL): 2445 mL  Total OUT: 2445 mL    Total NET: -1234.2 mL      12 Sep 2024 07:01  -  12 Sep 2024 20:43  --------------------------------------------------------  IN:    DOBUTamine: 120.4 mL    IV PiggyBack: 150 mL    Oral Fluid: 240 mL    Vasopressin: 66.1 mL  Total IN: 576.5 mL    OUT:    Indwelling Catheter - Urethral (mL): 1865 mL  Total OUT: 1865 mL    Total NET: -1288.5 mL    =========================== LABS =========================                        7.5    3.64  )-----------( 60       ( 12 Sep 2024 15:56 )             22.7     09-12    131<L>  |  91<L>  |  95<H>  ----------------------------<  135<H>  3.6   |  20<L>  |  3.84<H>    Ca    8.4      12 Sep 2024 15:56  Phos  4.5     09-12  Mg     2.0     09-12    TPro  6.0  /  Alb  2.8<L>  /  TBili  2.3<H>  /  DBili  x   /  AST  52<H>  /  ALT  26  /  AlkPhos  220<H>  09-12    LIVER FUNCTIONS - ( 12 Sep 2024 15:56 )  Alb: 2.8 g/dL / Pro: 6.0 g/dL / ALK PHOS: 220 U/L / ALT: 26 U/L / AST: 52 U/L / GGT: x           PT/INR - ( 12 Sep 2024 00:25 )   PT: 12.4 sec;   INR: 1.13 ratio    PTT - ( 12 Sep 2024 00:25 )  PTT:30.2 sec    Blood Gas Venous - Lactate: 1.7 mmol/L (09-12-24 @ 15:44)  Blood Gas Venous - Lactate: 1.4 mmol/L (09-12-24 @ 08:09)  Blood Gas Venous - Lactate: 1.4 mmol/L (09-12-24 @ 00:02)  Blood Gas Venous - Lactate: 1.7 mmol/L (09-11-24 @ 20:43)  Blood Gas Venous - Lactate: 1.6 mmol/L (09-11-24 @ 14:55)  Blood Gas Venous - Lactate: 1.0 mmol/L (09-11-24 @ 00:23)  Blood Gas Venous - Lactate: 1.5 mmol/L (09-10-24 @ 17:18)  Blood Gas Venous - Lactate: 2.2 mmol/L (09-10-24 @ 14:25)  Blood Gas Venous - Lactate: 1.4 mmol/L (09-10-24 @ 00:02)

## 2024-09-12 NOTE — PROGRESS NOTE ADULT - ASSESSMENT
====================ASSESSMENT ==============  79-year-old male with a history of HFrEF on home dobutamine 7.5 with last EF less than 20%, CAD, lungs mass - SCC, and urinary retention [facility] team after having syncopal episode last night, admitted for cardiogenic shock.    Plan:  ====================== NEUROLOGY=====================  - A&0 x2  - CT head post syncopal episode with no acute injury  - continue to monitor mental status per protocol    acetaminophen   IVPB .. 1000 milliGRAM(s) IV Intermittent once  melatonin 5 milliGRAM(s) Oral at bedtime    ==================== RESPIRATORY======================  # hx lung mass, SCC c/b fluid overload  - Required CPAP 10/5 50% weaned off to NC 2 L; now RA  - saturating well   - continue to monitor spO2    ====================CARDIOVASCULAR==================  # HFrEF, acute decompensated heart failure  - on home dobutamine @ 7.5 mcg increased to 10 mcg given low Venous; now will decrease to 7.5mg  - Vasopressin 0.02 MAP goal 65-70  - 2 Bumex PO TID  - Unable to float swan however has a cordis for monitoring of venous sats  - borderline BPs without room for afterload reduction/GDMT  - continue strict I&O  - continue to trend lactate and perfusion indices    #CAD  - on aspirin and plavix at home  - continue    buMETAnide 2 milliGRAM(s) Oral <User Schedule>  DOBUTamine Infusion 7.5 MICROgram(s)/kG/Min (8.6 mL/Hr) IV Continuous <Continuous>    ===================HEMATOLOGIC/ONC ===================  - Hgb 7.5, likelt 2/2 to volume overload, will ctm  - platelets low  but appears chronic  - continue to trend  DVT ppx: SCDs, given thrombocytopenia    aspirin enteric coated 81 milliGRAM(s) Oral daily  clopidogrel Tablet 75 milliGRAM(s) Oral daily    ===================== RENAL =========================  BRODY on CKD  - sCr 3.89 downtrending, baseline usually around 2  - likely i/s/o cardiogenic shock  - strict I&O, trend renal function daily    Continue monitoring urine output    ==================== GASTROINTESTINAL===================  - diet as tolerated  - monitor for BM    polyethylene glycol 3350 17 Gram(s) Oral daily  senna 2 Tablet(s) Oral at bedtime    =======================    ENDOCRINE  =====================  - f/u a1c, trend glucose on cmp  - f/u tsh and lipid profile    vasopressin Infusion 0.02 Unit(s)/Min (3 mL/Hr) IV Continuous <Continuous>    ========================INFECTIOUS DISEASE================  - hypothermic on admission  - f/u infectious w/u  - continue to trend wbc and fever curve  - received Vanc x1; c/w cefepime empirically given hypothermia.     Patient requires continuous monitoring with bedside rhythm monitoring, pulse ox monitoring, and intermittent blood gas analysis. Care plan discussed with ICU care team. Patient remained critical and at risk for life threatening decompensation.  Patient seen, examined and plan discussed with CCU team during rounds.     I have personally provided 35 minutes of critical care time excluding time spent on separate procedures, in addition to initial critical care time provided by the CICU Attending, Dr. Mohamud.     By signing my name below, I, Daniel Colorado, attest that this documentation has been prepared under the direction and in the presence of Angle Mccoy NP  Electronically signed: Beeia Destini [place], 09-12-24 @ 20:44    I, Angle Mccoy NP, personally performed the services described in this documentation. all medical record entries made by the priceibe were at my direction and in my presence. I have reviewed the chart and agree that the record reflects my personal performance and is accurate and complete  Electronically signed: Angle Mccoy NP

## 2024-09-13 NOTE — PROGRESS NOTE ADULT - SUBJECTIVE AND OBJECTIVE BOX
ELYSE MALAVE  [id number]  Patient is a 79y old  Male who presents with a chief complaint of cardiogenic shock (13 Sep 2024 13:57)    HPI:  79-year-old male with a history of HFrEF on home dobutamine 7.5 with last EF less than 20%, CAD, lungs mass - SCC, and urinary retention presented transferred from [facility] for management of heart failure after having syncopal episode last night.  Patient also having reduced urine output despite 6 mg of Bumex twice a day.  At [facility] patient had a workup which was notable for sodium of 124, creatinine to 4.6 from his baseline of approximately 2-2.1, proBNP 5220, lactate 1.9.  He was transferred here for further evaluation and  heart failure management, admitted to CICU.    On admission to CICU he is A&O, saturating well on room air, sinus rhythm 75 /59. He denies dizziness, chest pain, sob, N&V on admission. (07 Sep 2024 23:06)    24 HOUR EVENTS:    ICU Vital Signs Last 24 Hrs  T(C): 36.9 (13 Sep 2024 19:00), Max: 36.9 (13 Sep 2024 19:00)  T(F): 98.4 (13 Sep 2024 19:00), Max: 98.4 (13 Sep 2024 19:00)  HR: 80 (13 Sep 2024 20:00) (76 - 99)  BP: 86/49 (13 Sep 2024 20:00) (67/42 - 109/48)  BP(mean): 62 (13 Sep 2024 20:00) (48 - 80)  ABP: --  ABP(mean): --  RR: 34 (13 Sep 2024 20:00) (12 - 97)  SpO2: 95% (13 Sep 2024 20:00) (90% - 100%)    O2 Parameters below as of 13 Sep 2024 19:00  Patient On (Oxygen Delivery Method): room air    CVP(mm Hg): 17 (09-13-24 @ 20:00) (6 - 25)  CO: --  CI: --  PA: --  PA(mean): --  PA(direct): --  PCWP: --  LA: --  RA: --  SVR: --  SVRI: --  PVR: --  PVRI: --  I&O's Summary    12 Sep 2024 07:01  -  13 Sep 2024 07:00  --------------------------------------------------------  IN: 883.1 mL / OUT: 2820 mL / NET: -1936.9 mL    13 Sep 2024 07:01  -  13 Sep 2024 21:17  --------------------------------------------------------  IN: 420.4 mL / OUT: 620 mL / NET: -199.6 mL    ============================I/O===========================   I&O's Detail    12 Sep 2024 07:01  -  13 Sep 2024 07:00  --------------------------------------------------------  IN:    DOBUTamine: 206.4 mL    IV PiggyBack: 250 mL    Oral Fluid: 300 mL    Vasopressin: 126.7 mL  Total IN: 883.1 mL    OUT:    Indwelling Catheter - Urethral (mL): 2820 mL  Total OUT: 2820 mL    Total NET: -1936.9 mL    13 Sep 2024 07:01  -  13 Sep 2024 21:17  --------------------------------------------------------  IN:    DOBUTamine: 120.4 mL    Oral Fluid: 120 mL    Vasopressin: 180 mL  Total IN: 420.4 mL    OUT:    Indwelling Catheter - Urethral (mL): 620 mL  Total OUT: 620 mL    Total NET: -199.6 mL    ============================ LABS =========================             7.7    3.94  )-----------( 67       ( 13 Sep 2024 00:59 )             23.4     09-13    129<L>  |  88<L>  |  96<H>  ----------------------------<  138<H>  3.7   |  22  |  4.09<H>    Ca    8.8      13 Sep 2024 00:59  Phos  5.8     09-13  Mg     2.2     09-13    TPro  6.4  /  Alb  2.8<L>  /  TBili  2.4<H>  /  DBili  x   /  AST  57<H>  /  ALT  26  /  AlkPhos  215<H>  09-13    LIVER FUNCTIONS - ( 13 Sep 2024 00:59 )  Alb: 2.8 g/dL / Pro: 6.4 g/dL / ALK PHOS: 215 U/L / ALT: 26 U/L / AST: 57 U/L / GGT: x           PT/INR - ( 13 Sep 2024 00:59 )   PT: 13.8 sec;   INR: 1.33 ratio      PTT - ( 13 Sep 2024 00:59 )  PTT:28.6 sec    Blood Gas Venous - Lactate: 1.6 mmol/L (09-13-24 @ 01:55)  Blood Gas Venous - Lactate: 2.0 mmol/L (09-13-24 @ 00:28)  Blood Gas Venous - Lactate: 1.7 mmol/L (09-12-24 @ 15:44)  Blood Gas Venous - Lactate: 1.4 mmol/L (09-12-24 @ 08:09)  Blood Gas Venous - Lactate: 1.4 mmol/L (09-12-24 @ 00:02)  Blood Gas Venous - Lactate: 1.7 mmol/L (09-11-24 @ 20:43)  Blood Gas Venous - Lactate: 1.6 mmol/L (09-11-24 @ 14:55)  Blood Gas Venous - Lactate: 1.0 mmol/L (09-11-24 @ 00:23)    Urinalysis Basic - ( 13 Sep 2024 00:59 )    Color: x / Appearance: x / SG: x / pH: x  Gluc: 138 mg/dL / Ketone: x  / Bili: x / Urobili: x   Blood: x / Protein: x / Nitrite: x   Leuk Esterase: x / RBC: x / WBC x   Sq Epi: x / Non Sq Epi: x / Bacteria: x    ======================Micro/Rad/Cardio=================  Telemtry: Reviewed   EKG: Reviewed  CXR: Reviewed  Culture: Reviewed   ======================================================  PAST MEDICAL & SURGICAL HISTORY:  BPH (benign prostatic hyperplasia)      Bipolar disorder      Depression      Anxiety      Umbilical hernia, incarcerated      Constipation      Urinary retention      CAD (coronary artery disease)      SCC (squamous cell carcinoma)      Lung mass      CHF, chronic      Heart failure with reduced ejection fraction      History of arthroscopy of knee  Right, 1987      History of tonsillectomy  1952      History of hernia repair      H/O coronary angiogram        ====================ASSESSMENT ==============  79-year-old male with a history of HFrEF on home dobutamine 7.5 with last EF less than 20%, CAD, lungs mass - SCC, and urinary retention presented transferred from [facility] hospital for management failure team after having syncopal episode last night, admitted for cardiogenic shock.    Plan:  ====================== NEUROLOGY=====================  - A&0 x2  - CT head post syncopal episode with no acute injury  - continue to monitor mental status per protocol    HYDROmorphone  Injectable 0.2 milliGRAM(s) IV Push every 30 minutes PRN Moderate Pain (4 - 6)  LORazepam   Injectable 0.5 milliGRAM(s) IV Push every 4 hours PRN Anxiety  melatonin 5 milliGRAM(s) Oral at bedtime    ==================== RESPIRATORY======================  # hx lung mass, SCC c/b fluid overload  - Required CPAP 10/5 50% weaned off to NC 2 L; now RA  - saturating well   - continue to monitor spO2    ====================CARDIOVASCULAR==================  # HFrEF, acute decompensated heart failure  - on home dobutamine @ 7.5 mcg increased to 10 mcg given low Venous; now will decrease to 7.5mg  - Vasopressin 0.02 MAP goal 65-70  - 2 Bumex PO TID  - Unable to float swan however has a cordis for monitoring of venous sats  - borderline BPs without room for afterload reduction/GDMT  - continue strict I&O  - continue to trend lactate and perfusion indices    #CAD  - on aspirin and plavix at home  - continue    buMETAnide 2 milliGRAM(s) Oral <User Schedule>  DOBUTamine Infusion 7.5 MICROgram(s)/kG/Min (8.6 mL/Hr) IV Continuous <Continuous>    ===================HEMATOLOGIC/ONC ===================  - Hgb 7.5, likelt 2/2 to volume overload, will ctm  - platelets low  but appears chronic  - continue to trend  DVT ppx: SCDs, given thrombocytopenia    aspirin enteric coated 81 milliGRAM(s) Oral daily  clopidogrel Tablet 75 milliGRAM(s) Oral daily    ===================== RENAL =========================  BRODY on CKD  - sCr 3.89 downtrending, baseline usually around 2  - likely i/s/o cardiogenic shock  - strict I&O, trend renal function daily    ==================== GASTROINTESTINAL===================  - diet as tolerated  - monitor for BM    polyethylene glycol 3350 17 Gram(s) Oral daily  senna 2 Tablet(s) Oral at bedtime    =======================    ENDOCRINE  =====================  - f/u a1c, trend glucose on cmp  - f/u tsh and lipid profile    vasopressin Infusion 0.02 Unit(s)/Min (3 mL/Hr) IV Continuous <Continuous>    ========================INFECTIOUS DISEASE================  - hypothermic on admission  - f/u infectious w/u  - continue to trend wbc and fever curve  - received Vanc x1; c/w cefepime empirically given hypothermia.     Patient requires continuous monitoring with bedside rhythm monitoring, pulse ox monitoring, and intermittent blood gas analysis. Care plan discussed with ICU care team. Patient remained critical and at risk for life threatening decompensation.  Patient seen, examined and plan discussed with CCU team during rounds.     I have personally provided ____ minutes of critical care time excluding time spent on separate procedures, in addition to initial critical care time provided by the CICU Attending, Dr. Mohamud.     By signing my name below, I, Daniel Colorado, attest that this documentation has been prepared under the direction and in the presence of Angle Mccoy NP_____  Electronically signed: Caro Lizarraga, 09-13-24 @ 21:17    I, Angle Mccoy NP, personally performed the services described in this documentation. all medical record entries made by the caro were at my direction and in my presence. I have reviewed the chart and agree that the record reflects my personal performance and is accurate and complete  Electronically signed: Angle Mccoy NP   ELYSE MALAVE  [id number]  Patient is a 79y old  Male who presents with a chief complaint of cardiogenic shock (13 Sep 2024 13:57)    HPI:  79-year-old male with a history of HFrEF on home dobutamine 7.5 with last EF less than 20%, CAD, lungs mass - SCC, and urinary retention presented transferred from [facility] for management of heart failure after having syncopal episode last night.  Patient also having reduced urine output despite 6 mg of Bumex twice a day.  At [facility] patient had a workup which was notable for sodium of 124, creatinine to 4.6 from his baseline of approximately 2-2.1, proBNP 5220, lactate 1.9.  He was transferred here for further evaluation and  heart failure management, admitted to CICU.    On admission to CICU he is A&O, saturating well on room air, sinus rhythm 75 /59. He denies dizziness, chest pain, sob, N&V on admission. (07 Sep 2024 23:06)    24 HOUR EVENTS:  - No escalation of care    ICU Vital Signs Last 24 Hrs  T(C): 36.9 (13 Sep 2024 19:00), Max: 36.9 (13 Sep 2024 19:00)  T(F): 98.4 (13 Sep 2024 19:00), Max: 98.4 (13 Sep 2024 19:00)  HR: 80 (13 Sep 2024 20:00) (76 - 99)  BP: 86/49 (13 Sep 2024 20:00) (67/42 - 109/48)  BP(mean): 62 (13 Sep 2024 20:00) (48 - 80)  ABP: --  ABP(mean): --  RR: 34 (13 Sep 2024 20:00) (12 - 97)  SpO2: 95% (13 Sep 2024 20:00) (90% - 100%)    O2 Parameters below as of 13 Sep 2024 19:00  Patient On (Oxygen Delivery Method): room air    CVP(mm Hg): 17 (09-13-24 @ 20:00) (6 - 25)  CO: --  CI: --  PA: --  PA(mean): --  PA(direct): --  PCWP: --  LA: --  RA: --  SVR: --  SVRI: --  PVR: --  PVRI: --  I&O's Summary    12 Sep 2024 07:01  -  13 Sep 2024 07:00  --------------------------------------------------------  IN: 883.1 mL / OUT: 2820 mL / NET: -1936.9 mL    13 Sep 2024 07:01  -  13 Sep 2024 21:17  --------------------------------------------------------  IN: 420.4 mL / OUT: 620 mL / NET: -199.6 mL    ============================I/O===========================   I&O's Detail    12 Sep 2024 07:01  -  13 Sep 2024 07:00  --------------------------------------------------------  IN:    DOBUTamine: 206.4 mL    IV PiggyBack: 250 mL    Oral Fluid: 300 mL    Vasopressin: 126.7 mL  Total IN: 883.1 mL    OUT:    Indwelling Catheter - Urethral (mL): 2820 mL  Total OUT: 2820 mL    Total NET: -1936.9 mL    13 Sep 2024 07:01  -  13 Sep 2024 21:17  --------------------------------------------------------  IN:    DOBUTamine: 120.4 mL    Oral Fluid: 120 mL    Vasopressin: 180 mL  Total IN: 420.4 mL    OUT:    Indwelling Catheter - Urethral (mL): 620 mL  Total OUT: 620 mL    Total NET: -199.6 mL    ============================ LABS =========================             7.7    3.94  )-----------( 67       ( 13 Sep 2024 00:59 )             23.4     09-13    129<L>  |  88<L>  |  96<H>  ----------------------------<  138<H>  3.7   |  22  |  4.09<H>    Ca    8.8      13 Sep 2024 00:59  Phos  5.8     09-13  Mg     2.2     09-13    TPro  6.4  /  Alb  2.8<L>  /  TBili  2.4<H>  /  DBili  x   /  AST  57<H>  /  ALT  26  /  AlkPhos  215<H>  09-13    LIVER FUNCTIONS - ( 13 Sep 2024 00:59 )  Alb: 2.8 g/dL / Pro: 6.4 g/dL / ALK PHOS: 215 U/L / ALT: 26 U/L / AST: 57 U/L / GGT: x           PT/INR - ( 13 Sep 2024 00:59 )   PT: 13.8 sec;   INR: 1.33 ratio      PTT - ( 13 Sep 2024 00:59 )  PTT:28.6 sec    Blood Gas Venous - Lactate: 1.6 mmol/L (09-13-24 @ 01:55)  Blood Gas Venous - Lactate: 2.0 mmol/L (09-13-24 @ 00:28)  Blood Gas Venous - Lactate: 1.7 mmol/L (09-12-24 @ 15:44)  Blood Gas Venous - Lactate: 1.4 mmol/L (09-12-24 @ 08:09)  Blood Gas Venous - Lactate: 1.4 mmol/L (09-12-24 @ 00:02)  Blood Gas Venous - Lactate: 1.7 mmol/L (09-11-24 @ 20:43)  Blood Gas Venous - Lactate: 1.6 mmol/L (09-11-24 @ 14:55)  Blood Gas Venous - Lactate: 1.0 mmol/L (09-11-24 @ 00:23)

## 2024-09-13 NOTE — PROGRESS NOTE ADULT - SUBJECTIVE AND OBJECTIVE BOX
INTERVAL HPI/OVERNIGHT EVENTS:    SUBJECTIVE: Patient seen and examined at bedside.     ROS: All negative except as listed above.    VITAL SIGNS:  ICU Vital Signs Last 24 Hrs  T(C): 36.7 (13 Sep 2024 03:00), Max: 36.7 (12 Sep 2024 19:00)  T(F): 98.1 (13 Sep 2024 03:00), Max: 98.1 (12 Sep 2024 19:00)  HR: 80 (13 Sep 2024 07:45) (77 - 105)  BP: 97/53 (13 Sep 2024 07:30) (67/35 - 109/56)  BP(mean): 72 (13 Sep 2024 07:30) (45 - 82)  ABP: --  ABP(mean): --  RR: 26 (13 Sep 2024 07:45) (12 - 97)  SpO2: 95% (13 Sep 2024 07:45) (87% - 100%)    O2 Parameters below as of 13 Sep 2024 07:00  Patient On (Oxygen Delivery Method): room air            Plateau pressure:   P/F ratio:     09-12 @ 07:01  -  09-13 @ 07:00  --------------------------------------------------------  IN: 883.1 mL / OUT: 2820 mL / NET: -1936.9 mL      CAPILLARY BLOOD GLUCOSE          ECG: reviewed.    PHYSICAL EXAM:    GENERAL: NAD, lying in bed comfortably  HEAD:  Atraumatic, normocephalic  EYES: EOMI, PERRLA, conjunctiva and sclera clear  NECK: Supple, trachea midline, no JVD  HEART: Regular rate and rhythm, no murmurs, rubs, or gallops  LUNGS: Unlabored respirations.  Clear to auscultation bilaterally, no crackles, wheezing, or rhonchi  ABDOMEN: Soft, nontender, nondistended, +BS  EXTREMITIES: 2+ peripheral pulses bilaterally, cap refill<2 secs. No clubbing, cyanosis, or edema  NERVOUS SYSTEM:  A&Ox3, following commands, moving all extremities, no focal deficits   SKIN: No rashes or lesions    MEDICATIONS:  MEDICATIONS  (STANDING):  aspirin enteric coated 81 milliGRAM(s) Oral daily  buMETAnide 2 milliGRAM(s) Oral <User Schedule>  chlorhexidine 2% Cloths 1 Application(s) Topical daily  clopidogrel Tablet 75 milliGRAM(s) Oral daily  DOBUTamine Infusion 7.5 MICROgram(s)/kG/Min (8.6 mL/Hr) IV Continuous <Continuous>  melatonin 5 milliGRAM(s) Oral at bedtime  mupirocin 2% Nasal 1 Application(s) Both Nostrils two times a day  polyethylene glycol 3350 17 Gram(s) Oral daily  senna 2 Tablet(s) Oral at bedtime  vasopressin Infusion 0.02 Unit(s)/Min (3 mL/Hr) IV Continuous <Continuous>    MEDICATIONS  (PRN):      ALLERGIES:  Allergies    No Known Allergies    Intolerances    atorvastatin (Muscle Pain (Severe))      LABS:                        7.7    3.94  )-----------( 67       ( 13 Sep 2024 00:59 )             23.4     09-13    129<L>  |  88<L>  |  96<H>  ----------------------------<  138<H>  3.7   |  22  |  4.09<H>    Ca    8.8      13 Sep 2024 00:59  Phos  5.8     09-13  Mg     2.2     09-13    TPro  6.4  /  Alb  2.8<L>  /  TBili  2.4<H>  /  DBili  x   /  AST  57<H>  /  ALT  26  /  AlkPhos  215<H>  09-13    PT/INR - ( 13 Sep 2024 00:59 )   PT: 13.8 sec;   INR: 1.33 ratio         PTT - ( 13 Sep 2024 00:59 )  PTT:28.6 sec  Urinalysis Basic - ( 13 Sep 2024 00:59 )    Color: x / Appearance: x / SG: x / pH: x  Gluc: 138 mg/dL / Ketone: x  / Bili: x / Urobili: x   Blood: x / Protein: x / Nitrite: x   Leuk Esterase: x / RBC: x / WBC x   Sq Epi: x / Non Sq Epi: x / Bacteria: x      ABG:      vBG:  pH, Venous: 7.47 (09-13-24 @ 01:55)  pCO2, Venous: 36 mmHg (09-13-24 @ 01:55)  pO2, Venous: 28 mmHg (09-13-24 @ 01:55)  HCO3, Venous: 26 mmol/L (09-13-24 @ 01:55)    Micro:    Culture - Blood (collected 09-07-24 @ 16:30)  Source: .Blood Blood  Final Report (09-12-24 @ 22:01):    No growth at 5 days    Culture - Blood (collected 09-07-24 @ 16:15)  Source: .Blood Blood  Final Report (09-12-24 @ 22:01):    No growth at 5 days          RADIOLOGY & ADDITIONAL TESTS: Reviewed.   INTERVAL HPI/OVERNIGHT EVENTS:    SUBJECTIVE: Patient seen and examined at bedside. Aox1. Denies chest pain, SOB, palpitations, N/V.    ROS: All negative except as listed above.    VITAL SIGNS:  ICU Vital Signs Last 24 Hrs  T(C): 36.7 (13 Sep 2024 03:00), Max: 36.7 (12 Sep 2024 19:00)  T(F): 98.1 (13 Sep 2024 03:00), Max: 98.1 (12 Sep 2024 19:00)  HR: 80 (13 Sep 2024 07:45) (77 - 105)  BP: 97/53 (13 Sep 2024 07:30) (67/35 - 109/56)  BP(mean): 72 (13 Sep 2024 07:30) (45 - 82)  ABP: --  ABP(mean): --  RR: 26 (13 Sep 2024 07:45) (12 - 97)  SpO2: 95% (13 Sep 2024 07:45) (87% - 100%)    O2 Parameters below as of 13 Sep 2024 07:00  Patient On (Oxygen Delivery Method): room air            Plateau pressure:   P/F ratio:     09-12 @ 07:01  -  09-13 @ 07:00  --------------------------------------------------------  IN: 883.1 mL / OUT: 2820 mL / NET: -1936.9 mL      CAPILLARY BLOOD GLUCOSE          ECG: reviewed.    PHYSICAL EXAM:    GENERAL: NAD, lying in bed comfortably  HEAD:  Atraumatic, normocephalic  EYES: EOMI, PERRLA, conjunctiva and sclera clear  NECK: Supple, trachea midline, no JVD  HEART: Regular rate and rhythm, no murmurs, rubs, or gallops  LUNGS: Unlabored respirations.  Clear to auscultation bilaterally, no crackles, wheezing, or rhonchi  ABDOMEN: Soft, nontender, nondistended, +BS  EXTREMITIES: 2+ peripheral pulses bilaterally, cap refill<2 secs. No clubbing, cyanosis, or edema  NERVOUS SYSTEM:  A&Ox3, following commands, moving all extremities, no focal deficits   SKIN: No rashes or lesions    MEDICATIONS:  MEDICATIONS  (STANDING):  aspirin enteric coated 81 milliGRAM(s) Oral daily  buMETAnide 2 milliGRAM(s) Oral <User Schedule>  chlorhexidine 2% Cloths 1 Application(s) Topical daily  clopidogrel Tablet 75 milliGRAM(s) Oral daily  DOBUTamine Infusion 7.5 MICROgram(s)/kG/Min (8.6 mL/Hr) IV Continuous <Continuous>  melatonin 5 milliGRAM(s) Oral at bedtime  mupirocin 2% Nasal 1 Application(s) Both Nostrils two times a day  polyethylene glycol 3350 17 Gram(s) Oral daily  senna 2 Tablet(s) Oral at bedtime  vasopressin Infusion 0.02 Unit(s)/Min (3 mL/Hr) IV Continuous <Continuous>    MEDICATIONS  (PRN):      ALLERGIES:  Allergies    No Known Allergies    Intolerances    atorvastatin (Muscle Pain (Severe))      LABS:                        7.7    3.94  )-----------( 67       ( 13 Sep 2024 00:59 )             23.4     09-13    129<L>  |  88<L>  |  96<H>  ----------------------------<  138<H>  3.7   |  22  |  4.09<H>    Ca    8.8      13 Sep 2024 00:59  Phos  5.8     09-13  Mg     2.2     09-13    TPro  6.4  /  Alb  2.8<L>  /  TBili  2.4<H>  /  DBili  x   /  AST  57<H>  /  ALT  26  /  AlkPhos  215<H>  09-13    PT/INR - ( 13 Sep 2024 00:59 )   PT: 13.8 sec;   INR: 1.33 ratio         PTT - ( 13 Sep 2024 00:59 )  PTT:28.6 sec  Urinalysis Basic - ( 13 Sep 2024 00:59 )    Color: x / Appearance: x / SG: x / pH: x  Gluc: 138 mg/dL / Ketone: x  / Bili: x / Urobili: x   Blood: x / Protein: x / Nitrite: x   Leuk Esterase: x / RBC: x / WBC x   Sq Epi: x / Non Sq Epi: x / Bacteria: x      ABG:      vBG:  pH, Venous: 7.47 (09-13-24 @ 01:55)  pCO2, Venous: 36 mmHg (09-13-24 @ 01:55)  pO2, Venous: 28 mmHg (09-13-24 @ 01:55)  HCO3, Venous: 26 mmol/L (09-13-24 @ 01:55)    Micro:    Culture - Blood (collected 09-07-24 @ 16:30)  Source: .Blood Blood  Final Report (09-12-24 @ 22:01):    No growth at 5 days    Culture - Blood (collected 09-07-24 @ 16:15)  Source: .Blood Blood  Final Report (09-12-24 @ 22:01):    No growth at 5 days          RADIOLOGY & ADDITIONAL TESTS: Reviewed.

## 2024-09-13 NOTE — PROGRESS NOTE ADULT - CRITICAL CARE ATTENDING COMMENT
meds:  7.5, vaso, asa, plavix, bumex 2 PO Q 8.   CVP 13, CVP 45, Swapnil CI 2.1, HR 80SR, 80/-90/   I/o: -2L   09-13    129<L>  |  88<L>  |  96<H>  ----------------------------<  138<H>  3.7   |  22  |  4.09<H>    Ca    8.8      13 Sep 2024 00:59  Phos  5.8     09-13  Mg     2.2     09-13    TPro  6.4  /  Alb  2.8<L>  /  TBili  2.4<H>  /  DBili  x   /  AST  57<H>  /  ALT  26  /  AlkPhos  215<H>  09-13                        7.7    3.94  )-----------( 67       ( 13 Sep 2024 00:59 )             23.4   Patient deteriorating.   Now confused intermittently.   Note borderline hemodynamics.   discussed with CICU team.   Patient's wife now amenable to discuss home hospice.   Suggest:  Make  10  Continue standing diuretics for -ve balance.   consider further PRBCs  Palliative care to reingage.   Otoniel Baker
78 yo man on home inotropes transferred from OSH for cardiogenic shock     A+Ox3  Cardiogenic shock requiring Dobutamine, dose was increased from home dose, CI 2.4.  Unable to place swan only cordis   Acute hypoxic respiratory failure requiring Bipap, now on NC wean as tolerated   attempt to assess speach and swallow, given still tachypneic   oliguric BRODY requiring CVVH, CVP 25 this am thus needs aggressive fuid removal as tolerated given hemodynamics   H/H low but acceptable, thrombocytopenia at baseline   HSQ for DVT prophylaxis  Afebrile, on abx given shock, high possibility of infection, f/u cx   Sugars controlled  dc PICC, LFV leda 09/08, RIJ cordis 09/08     previously had MOLST form now wants full code and "everything done".  I have explained to him that overall his condition has not changed and the fact that he is not a candidate for AT, which he understands.  Advised to have discussions with family about his prognosis and expectations.

## 2024-09-13 NOTE — GOALS OF CARE CONVERSATION - ADVANCED CARE PLANNING - CONVERSATION DETAILS
[facility]AP continues to follow this case for ongoing GOC discussion and support. LCSW and Dr. Chavez met with patient's spouse this AM with follow up GOC discussion this afternoon regarding next steps.    Team first reintroduced selves and roles in patient care. Kenia verbalized understanding and was receptive to visit this AM. Kenia notes that she is aware that the team is looking for a decision regarding next steps but discussed wishing to engage patient in this discussion further as she does not wish to make any decisions without patient participation if possible. Team validated this with Kenia today and discussed plan for follow up this afternoon to further assess goals moving forward. Kenia verbalized agreement.    Team revisited Kenia this afternoon and Kenia informed team that she was able to engage with patient regarding his status and wishes given worsening clinical status. Kenia states that patient was able to tell her x2 that he does not find his present state to be an acceptable quality of life and wishes to instead transition the focus of his care to comfort. Kenia notes that this was patient's decision, and team validated Kenia in advocating for patient's wishes and needs, and the selfless nature of being patient's voice in such an overwhelming and difficult time. Much emotional support and active listening provided. Team encouraged continued utilization of physical touch and life review today throughout Kenia's visit, connecting patient with home and family. Kenia verbalized agreement, and notes that as much as it is distressing to her to lose patient, she is advocating for what patient wants after a long and arduous healthcare journey. Team again validated this today and assured ongoing availability and support.     Patient with capped pressors and is already DNR/I. If patient is hemodynamically stable, patient to be evaluated for PCU tomorrow, 9/14. Patient to remain on CICU at present time for ongoing EOL care and management.    GAP will continue to follow this case.     I, Adam Muse, where applicable, am scribing for and the presence of Dr. Chavez the following sections PARTICIPANTS, ADVANCED DIRECTIVES, CONVERSATION DISCUSSION, WHAT MATTERS MOST TO PATIENTS AND FAMILY, TREATMENT GUIDELINES, DATE OF MOLST, AND TIME SPENT. [facility]AP continues to follow this case for ongoing GOC discussion and support. LCSW and Dr. Chavez met with patient's spouse this AM with follow up GOC discussion this afternoon regarding next steps.    Team first reintroduced selves and roles in patient care. Kenia verbalized understanding and was receptive to visit this AM. Kenia notes that she is aware that the team is looking for a decision regarding next steps but discussed wishing to engage patient in this discussion further as she does not wish to make any decisions without patient participation if possible. Team validated this with Kenia today and discussed plan for follow up this afternoon to further assess goals moving forward. Kenia verbalized agreement.    Team revisited Kenia this afternoon and Kenia informed team that she was able to engage with patient regarding his status and wishes given worsening clinical status. Kenia states that patient was able to tell her x2 that he does not find his present state to be an acceptable quality of life and wishes to instead transition the focus of his care to comfort. Kenia notes that this was patient's decision, and team validated Kenia in advocating for patient's wishes and needs, and the selfless nature of being patient's voice in such an overwhelming and difficult time. Much emotional support and active listening provided. Team encouraged continued utilization of physical touch and life review today throughout Kenia's visit, connecting patient with home and family. Kenia verbalized agreement, and notes that as much as it is distressing to her to lose patient, she is advocating for what patient wants after a long and arduous healthcare journey. Team again validated this today and assured ongoing availability and support.     Patient with capped pressors and is already DNR/I. If patient is hemodynamically stable, patient to be evaluated for PCU tomorrow, 9/14. Patient to remain on CICU at present time for ongoing EOL care and management.    Kenia notes that patient is Advent.  called for last rites and prayer.     GAP will continue to follow this case.     IAdam, where applicable, am scribing for and the presence of Dr. Chavez the following sections PARTICIPANTS, ADVANCED DIRECTIVES, CONVERSATION DISCUSSION, WHAT MATTERS MOST TO PATIENTS AND FAMILY, TREATMENT GUIDELINES, DATE OF MOLST, AND TIME SPENT.

## 2024-09-13 NOTE — PROGRESS NOTE ADULT - ATTENDING COMMENTS
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 at 7.5  Admitted with worsening cardiogenic shock  Delirious, A+Ox1  Mixed shock requiring Dobutamine (on outpatient dose) for the cardiogenic component and Vasopressin for the vasodilatory component   Wean pressors as able  Did not tolerate trial of Milrinone  I think escalating Dobutamine at this point would not be of any benefit  O2 sats mid 90s on room air  DASH diet  Non-oliguric BRODY that is worsening, CVP remains elevated - continue to diurese to target 1-2L overall negative  H/H low but acceptable  Holding pharmacologic DVT prophylaxis given thrombocytopenia   Afebrile, s/p a course of empiric Cefepime for concern for pneumonia   Sugars controlled  RIJ Cordis 9/7  Poor prognosis - now DNR/DNI again. Palliative continues to follow. 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 at 7.5  Admitted with worsening cardiogenic shock  Delirious, A+Ox1  Mixed shock requiring Dobutamine (on outpatient dose) for the cardiogenic component and Vasopressin for the vasodilatory component   Wean pressors as able  Did not tolerate trial of Milrinone  I think escalating Dobutamine at this point would not be of any benefit  O2 sats mid 90s on room air  DASH diet  Non-oliguric BRODY that is worsening, CVP remains elevated - continue to diurese to target 1-2L overall negative  H/H low but acceptable  Holding pharmacologic DVT prophylaxis given thrombocytopenia   Afebrile, s/p a course of empiric Cefepime for concern for pneumonia   Sugars controlled  RIJ Cordis 9/7  Poor prognosis - now DNR/DNI again. Palliative continues to follow.    UPDATE: After discussions with Palliative the patient's wife does not want to escalate care and with the Dobutamine and Vasopressin on would like to the patient to otherwise be comfortable. Dilaudid and Ativan ordered. Will stop blood draws.

## 2024-09-13 NOTE — PROGRESS NOTE ADULT - PROBLEM SELECTOR PLAN 3
For acute issues or uncontrolled symptoms please page palliative team.    Amanda Chavez MD  Geriatrics and Palliative Medicine Attending  [facility] pager: [phone]     The Geriatrics and Palliative Medicine consult service has 24/7 coverage for medical recommendations, including symptom management needs. For symptoms recommend:  Dilaudid 0.5 mg IV q1h prn dyspnea or tachypnea  Dilaudid 0.5 mg IV q1h prn severe pain and Dilaudid 0.2 mg IV q1h prn moderate pain  Ativan 0.2 mg IV q2h prn agitation   Glycopyrrolate 0.4 mg IV q6h prn copious oral secretions   Dulcolax suppository daily prn constipation   Acetaminophen suppository 650 mg q6h prn fever  Zofran 4 mg IV q8h prn nausea or vomiting    Recommend discontinuation of all medications and interventions that do not serve goal of promoting patient's comfort and dignity at end-of-life.    Please page for any acute symptoms or further questions or concerns.

## 2024-09-13 NOTE — PROGRESS NOTE ADULT - ASSESSMENT
79-year-old male with a history of HFrEF on home dobutamine 7.5 with last EF less than 20%, CAD, lungs mass - SCC, and urinary retention presented transferred from clinical hospital for management failure team after having syncopal episode last night, admitted for cardiogenic shock.    Neuro  - A&0 x1  - CT head post syncopal episode with no acute injury  - continue to monitor mental status per protocol    Respiratory  #hx lung mass, SCC c/b fluid overload  - Required CPAP 10/5 50% weaned off to NC 2 L; now RA  - saturating well   - continue to monitor spO2    Cardiovascular  #HFrEF (last know EF%), acute decompensated heart failure  - on home dobutamine @ 7.5 mcg increased to 10 mcg given low Venous; now will decrease to 7.5mg  - Vasopressin 0.05 MAP goal 65-70  - 2 Bumex PO TID, 2mg IV Push stat x 1 (CVP 25) 9/12; IV push 2mg Bumex at midnight  - Unable to float swan however has a cordis for monitoring of venous sats  - borderline BPs without room for afterload reduction  - holding GDMT i/s/o shock  - continue strict I&O  - continue to trend lactate and perfusion indices    #CAD  - on aspirin and plavix at home  - continue    Renal  BRODY on CKD  - sCr 4.09 uptrending, baseline usually around 2  - likely i/s/o cardiogenic shock  - strict I&O, trend renal function daily    GI  - diet as tolerated  - monitor for BM    Endo  - f/u a1c, trend glucose on cmp  - f/u tsh and lipid profile    Heme  - Hgb 7.7, likely 2/2 to volume overload, will ctm  - platelets low  but appears chronic  - continue to trend  DVT ppx: SCDs, given thrombocytopenia    ID  - hypothermic on admission  - f/u infectious w/u  - continue to trend wbc and fever curve  - received Vanc x1; 5 day course of cefepime empirically given hypothermia.     SKIN:  - new purpuric macules on back , with ecchymosis on lower back  - derm c/s, will CTM    #Ethics: DNR/ DNI    Lines: PIVs, RIJ Cordis from 9/8    ======================= DISPOSITION  =====================  [X] Critical   [ ] Guarded    [ ] Stable    [X] Maintain in CICU  [ ] Downgrade to Telemetry  [ ] Discharge Home 79-year-old male with a history of HFrEF on home dobutamine 7.5 with last EF less than 20%, CAD, lungs mass - SCC, and urinary retention presented transferred from clinical hospital for management failure team after having syncopal episode last night, admitted for cardiogenic shock.    Neuro  - A&0 x1  - CT head post syncopal episode with no acute injury  - continue to monitor mental status per protocol    Respiratory  #hx lung mass, SCC c/b fluid overload  - Required CPAP 10/5 50% weaned off to NC 2 L; now RA  - saturating well   - continue to monitor spO2    Cardiovascular  #HFrEF (last know EF%), acute decompensated heart failure  - on home dobutamine @ 7.5 mcg increased to 10 mcg   - Vasopressin 0.1 MAP goal 65-70  - 2 Bumex PO TID, 2mg IV Push stat x 1 (CVP 25) 9/12; IV push 2mg Bumex at midnight  - Unable to float swan however has a cordis for monitoring of venous sats  - borderline BPs without room for afterload reduction  - holding GDMT i/s/o shock  - continue strict I&O  - continue to trend lactate and perfusion indices    #CAD  - on aspirin and plavix at home  - continue    Renal  BRODY on CKD  - sCr 4.09 uptrending, baseline usually around 2  - likely i/s/o cardiogenic shock  - strict I&O, trend renal function daily    GI  - diet as tolerated  - monitor for BM    Endo  - f/u a1c, trend glucose on cmp  - f/u tsh and lipid profile    Heme  - Hgb 7.7, likely 2/2 to volume overload, will ctm  - platelets low  but appears chronic  - continue to trend  DVT ppx: SCDs, given thrombocytopenia    ID  - hypothermic on admission  - f/u infectious w/u  - continue to trend wbc and fever curve  - received Vanc x1; 5 day course of cefepime empirically given hypothermia.     SKIN:  - new purpuric macules on back , with ecchymosis on lower back  - derm c/s, will CTM    #Ethics: DNR/ DNI    Lines: PIVs, RIJ Cordis from 9/8    ======================= DISPOSITION  =====================  [X] Critical   [ ] Guarded    [ ] Stable    [X] Maintain in CICU  [ ] Downgrade to Telemetry  [ ] Discharge Home

## 2024-09-13 NOTE — PROGRESS NOTE ADULT - PROBLEM SELECTOR PLAN 1
Regular solids with thin liquids as per MD order admitted in cardiogenic shock  pt continues to require IV inotrope (on dobutamine at baseline) and increasing pressor support  not a candidate for AT  guarded prognosis  wife understanding of prognosis but struggling with idea of capping/de-escalating care while patient is still awake.

## 2024-09-13 NOTE — PROGRESS NOTE ADULT - ASSESSMENT
====================ASSESSMENT ==============  79-year-old male with a history of HFrEF on home dobutamine 7.5 with last EF less than 20%, CAD, lungs mass - SCC, and urinary retention presented transferred from [facility] for management failure team after having syncopal episode last night, admitted for cardiogenic shock.    Plan:  ====================== NEUROLOGY=====================  - A&0 x2  - CT head post syncopal episode with no acute injury  - continue to monitor mental status per protocol    HYDROmorphone  Injectable 0.2 milliGRAM(s) IV Push every 30 minutes PRN Moderate Pain (4 - 6)  LORazepam   Injectable 0.5 milliGRAM(s) IV Push every 4 hours PRN Anxiety  melatonin 5 milliGRAM(s) Oral at bedtime    ==================== RESPIRATORY======================  # hx lung mass, SCC c/b fluid overload  - Required CPAP 10/5 50% weaned off to NC 2 L; now RA  - saturating well   - continue to monitor spO2    ====================CARDIOVASCULAR==================  # HFrEF, acute decompensated heart failure  - on home dobutamine @ 7.5 mcg increased to 10 mcg given low Venous; now will decrease to 7.5mg  - Vasopressin 0.02 MAP goal 65-70  - 2 Bumex PO TID  - Unable to float swan however has a cordis for monitoring of venous sats  - borderline BPs without room for afterload reduction/GDMT  - continue strict I&O  - continue to trend lactate and perfusion indices    #CAD  - on aspirin and plavix at home  - continue    buMETAnide 2 milliGRAM(s) Oral <User Schedule>  DOBUTamine Infusion 7.5 MICROgram(s)/kG/Min (8.6 mL/Hr) IV Continuous <Continuous>    ===================HEMATOLOGIC/ONC ===================  - Hgb 7.5, likelt 2/2 to volume overload, will ctm  - platelets low  but appears chronic  - continue to trend  DVT ppx: SCDs, given thrombocytopenia    aspirin enteric coated 81 milliGRAM(s) Oral daily  clopidogrel Tablet 75 milliGRAM(s) Oral daily    ===================== RENAL =========================  BRODY on CKD  - sCr 3.89 downtrending, baseline usually around 2  - likely i/s/o cardiogenic shock  - strict I&O, trend renal function daily    ==================== GASTROINTESTINAL===================  - diet as tolerated  - monitor for BM    polyethylene glycol 3350 17 Gram(s) Oral daily  senna 2 Tablet(s) Oral at bedtime    =======================    ENDOCRINE  =====================  - f/u a1c, trend glucose on cmp  - f/u tsh and lipid profile    vasopressin Infusion 0.02 Unit(s)/Min (3 mL/Hr) IV Continuous <Continuous>    ========================INFECTIOUS DISEASE================  - hypothermic on admission  - f/u infectious w/u  - continue to trend wbc and fever curve  - received Vanc x1; c/w cefepime empirically given hypothermia.    Patient requires continuous monitoring with bedside rhythm monitoring, pulse ox monitoring, and intermittent blood gas analysis. Care plan discussed with ICU care team. Patient remained critical and at risk for life threatening decompensation.  Patient seen, examined and plan discussed with CCU team during rounds.     I have personally provided 35 minutes of critical care time excluding time spent on separate procedures, in addition to initial critical care time provided by the CICU Attending, Dr. Mohamud.     By signing my name below, I, Daniel Colorado, attest that this documentation has been prepared under the direction and in the presence of Angle Mccoy NP  Electronically signed: Destini Lizarraga, 09-13-24 @ 21:17    I, Angle Mccoy NP, personally performed the services described in this documentation. all medical record entries made by the scribe were at my direction and in my presence. I have reviewed the chart and agree that the record reflects my personal performance and is accurate and complete  Electronically signed: Angle Mccoy NP

## 2024-09-13 NOTE — PROGRESS NOTE ADULT - SUBJECTIVE AND OBJECTIVE BOX
24H events:    Patient is a 79y old Male who presents with a chief complaint of cardiogenic shock (12 Sep 2024 21:28)    Primary diagnosis of Worsening heart failure       Today is hospital day 6d. This morning patient was seen and examined at bedside, resting comfortably in bed.      PAST MEDICAL & SURGICAL HISTORY  BPH (benign prostatic hyperplasia)    Bipolar disorder    Depression    Anxiety    Umbilical hernia, incarcerated    Constipation    Urinary retention    CAD (coronary artery disease)    SCC (squamous cell carcinoma)    Lung mass    CHF, chronic    Heart failure with reduced ejection fraction    History of arthroscopy of knee  Right, 1987    History of tonsillectomy  1952    History of hernia repair    H/O coronary angiogram      SOCIAL HISTORY:  Social History:      ALLERGIES:  No Known Allergies    MEDICATIONS:  STANDING MEDICATIONS  aspirin enteric coated 81 milliGRAM(s) Oral daily  buMETAnide 2 milliGRAM(s) Oral <User Schedule>  chlorhexidine 2% Cloths 1 Application(s) Topical daily  clopidogrel Tablet 75 milliGRAM(s) Oral daily  DOBUTamine Infusion 7.5 MICROgram(s)/kG/Min IV Continuous <Continuous>  melatonin 5 milliGRAM(s) Oral at bedtime  mupirocin 2% Nasal 1 Application(s) Both Nostrils two times a day  polyethylene glycol 3350 17 Gram(s) Oral daily  senna 2 Tablet(s) Oral at bedtime  vasopressin Infusion 0.02 Unit(s)/Min IV Continuous <Continuous>    PRN MEDICATIONS        LABS:                        7.7    3.94  )-----------( 67       ( 13 Sep 2024 00:59 )             23.4     09-13    129<L>  |  88<L>  |  96<H>  ----------------------------<  138<H>  3.7   |  22  |  4.09<H>    Ca    8.8      13 Sep 2024 00:59  Phos  5.8     09-13  Mg     2.2     09-13    TPro  6.4  /  Alb  2.8<L>  /  TBili  2.4<H>  /  DBili  x   /  AST  57<H>  /  ALT  26  /  AlkPhos  215<H>  09-13    PT/INR - ( 13 Sep 2024 00:59 )   PT: 13.8 sec;   INR: 1.33 ratio         PTT - ( 13 Sep 2024 00:59 )  PTT:28.6 sec  Urinalysis Basic - ( 13 Sep 2024 00:59 )    Color: x / Appearance: x / SG: x / pH: x  Gluc: 138 mg/dL / Ketone: x  / Bili: x / Urobili: x   Blood: x / Protein: x / Nitrite: x   Leuk Esterase: x / RBC: x / WBC x   Sq Epi: x / Non Sq Epi: x / Bacteria: x                RADIOLOGY:    VITALS:   T(F): 98.1  HR: 80  BP: 97/53  RR: 26  SpO2: 95%    PHYSICAL EXAM:  GENERAL: NAD, well-groomed, well-developed  HEAD:  Atraumatic, Normocephalic  EYES: EOMI  NECK: Supple  NERVOUS SYSTEM:  Alert & Oriented X3, non focal   CHEST/LUNG: Clear to auscultation bilaterally; No rales, rhonchi, wheezing, or rubs  HEART: Regular rate and rhythm; No murmurs, rubs, or gallops  ABDOMEN: Soft, Nontender, Nondistended; Bowel sounds present  EXTREMITIES:  2+ Peripheral Pulses, No clubbing, cyanosis, or edema  LYMPH: No lymphadenopathy noted  SKIN: No rashes or lesions       24H events:    Patient is a 79y old Male who presents with a chief complaint of cardiogenic shock (12 Sep 2024 21:28)    Primary diagnosis of Worsening heart failure     Today is hospital day 6d. This morning patient was seen and examined at bedside, resting comfortably in bed.  Patient appears more lethargic today. Wife at bedside.    PAST MEDICAL & SURGICAL HISTORY  BPH (benign prostatic hyperplasia)    Bipolar disorder    Depression    Anxiety    Umbilical hernia, incarcerated    Constipation    Urinary retention    CAD (coronary artery disease)    SCC (squamous cell carcinoma)    Lung mass    CHF, chronic    Heart failure with reduced ejection fraction    History of arthroscopy of knee  Right, 1987    History of tonsillectomy  1952    History of hernia repair    H/O coronary angiogram      SOCIAL HISTORY:  Social History:      ALLERGIES:  No Known Allergies    MEDICATIONS:  STANDING MEDICATIONS  aspirin enteric coated 81 milliGRAM(s) Oral daily  buMETAnide 2 milliGRAM(s) Oral <User Schedule>  chlorhexidine 2% Cloths 1 Application(s) Topical daily  clopidogrel Tablet 75 milliGRAM(s) Oral daily  DOBUTamine Infusion 7.5 MICROgram(s)/kG/Min IV Continuous <Continuous>  melatonin 5 milliGRAM(s) Oral at bedtime  mupirocin 2% Nasal 1 Application(s) Both Nostrils two times a day  polyethylene glycol 3350 17 Gram(s) Oral daily  senna 2 Tablet(s) Oral at bedtime  vasopressin Infusion 0.02 Unit(s)/Min IV Continuous <Continuous>    PRN MEDICATIONS        LABS:                        7.7    3.94  )-----------( 67       ( 13 Sep 2024 00:59 )             23.4     09-13    129<L>  |  88<L>  |  96<H>  ----------------------------<  138<H>  3.7   |  22  |  4.09<H>    Ca    8.8      13 Sep 2024 00:59  Phos  5.8     09-13  Mg     2.2     09-13    TPro  6.4  /  Alb  2.8<L>  /  TBili  2.4<H>  /  DBili  x   /  AST  57<H>  /  ALT  26  /  AlkPhos  215<H>  09-13    PT/INR - ( 13 Sep 2024 00:59 )   PT: 13.8 sec;   INR: 1.33 ratio         PTT - ( 13 Sep 2024 00:59 )  PTT:28.6 sec  Urinalysis Basic - ( 13 Sep 2024 00:59 )    Color: x / Appearance: x / SG: x / pH: x  Gluc: 138 mg/dL / Ketone: x  / Bili: x / Urobili: x   Blood: x / Protein: x / Nitrite: x   Leuk Esterase: x / RBC: x / WBC x   Sq Epi: x / Non Sq Epi: x / Bacteria: x    RADIOLOGY:  TTE W or WO Ultrasound Enhancing Agent (09.08.24 @ 07:21) >   1. The interventricular septum is flattened in systole and diastole consistent with right ventricular pressure and volume overload. Left ventricular systolic function is severely decreased with an ejection fraction visually estimated at 20 to 25 %. Regional wall motion abnormalities present.   2. Multiple segmental abnormalities exist. See findings.   3. Severely enlarged right ventricular cavity size, with normal wall thickness, and moderately reduced right ventricular systolic function.   4. The right atrium is severely dilated.   5. Moderate mitral regurgitation.   6. Estimated pulmonary artery systolic pressure is 49 mmHg, consistent with moderate pulmonary hypertension.   7. No prior echocardiogram is available for comparison.   8. Right pleural effusion noted.   9. The inferior vena cava is dilated measuring 2.20 cm in diameter, (dilated >2.1cm) with abnormal inspiratory collapse (abnormal <50%) consistent with elevated right atrial pressure (~15, range 10-20mmHg).  10. There is increased LV mass and eccentric hypertrophy.  11. There is no evidence of a left ventricular thrombus.      VITALS:   T(F): 98.1  HR: 80  BP: 97/53  RR: 26  SpO2: 95%      PHYSICAL EXAM:  GENERAL: frail   HEAD:  Atraumatic, Normocephalic  EYES: EOMI  NECK: Supple  NERVOUS SYSTEM:  Alert & Oriented x2, non focal   CHEST/LUNG: b/l crackles   HEART: Regular rate and rhythm; No murmurs, rubs, or gallops  ABDOMEN: Soft, Nontender, Nondistended; Bowel sounds present  EXTREMITIES:  2+ Peripheral Pulses, No clubbing, cyanosis, or edema  LYMPH: No lymphadenopathy noted  (+) TLC

## 2024-09-13 NOTE — PROGRESS NOTE ADULT - ASSESSMENT
79-year-old male with a history of HFrEF on home dobutamine 7.5 with last EF less than 20%, CAD, lungs mass - SCC, and urinary retention presented transferred from clinical hospital for management failure team after having syncopal episode last night, admitted for cardiogenic shock.    # End stage HF  # BRODY on CKD (cardiorenal Syndrome)  # Chronic Anemia   # Hx of 3VD  - warm and well perfused  - CVP 13   - Cr: 3.42 --> 3.83 --> 4.09 (baseline 2.4)  - CO/CI: 2.8 / 2.1  - c/w PO Bumex 2mg q8 --> last 24hrs neg 2L   - titrate up dobutamine to 10  - wean off vasopressin   - c/w ASA, Plavix  - strict I&O, trend renal function daily  - not candidate for advance HF therapies   - recall palliative care for home hospice  79-year-old male with a history of HFrEF on home dobutamine 7.5 with last EF less than 20%, CAD, lungs mass - SCC, and urinary retention presented transferred from clinical hospital for management failure team after having syncopal episode last night, admitted for cardiogenic shock.    # End stage HF  # BRODY on CKD (cardiorenal Syndrome)  # Chronic Anemia   # Hx of 3VD  - warm and well perfused  - CVP 13   - Cr: 3.42 --> 3.83 --> 4.09 (baseline 2.4)  - CO/CI: 2.8 / 2.1  - c/w PO Bumex 2mg q8 --> last 24hrs neg 2L   - titrate up dobutamine to 10  - wean off vasopressin   - c/w ASA, Plavix  - transfuse 1U pRBC  - strict I&O, trend renal function daily  - not candidate for advance HF therapies   - recall palliative care for home hospice referral

## 2024-09-13 NOTE — PROGRESS NOTE ADULT - PROBLEM SELECTOR PLAN 2
Patient's wife understanding of guarded prognosis and that patient is entering dying process but struggling with idea of capping/de-escalating care while patient is still awake. Wife had expressed possible interest in home hospice to primary team. Explained to her that given patient is requiring IV pressors and IV dobutamine which is a barrier to him going home. Patient's wife understanding of guarded prognosis and that patient is entering dying process but struggling with idea of capping/de-escalating care while patient is still awake. Wife had expressed possible interest in home hospice to primary team. Explained to her that given patient is requiring IV pressors and IV dobutamine which is a barrier to him going home.    ADDENDUM 3PM: Patient expressed wish for comfort care. Wife supportive of patient's wishes and plan is to cap pressor and inotrope support and initiate comfort measures. Pt remains hypotensive and unstable for transfer. Will continue end of life care in CICU. Discussed with Dr. Mohamud.  *see separate GOC note for details    46 minute spent on ACP.

## 2024-09-13 NOTE — PROGRESS NOTE ADULT - SUBJECTIVE AND OBJECTIVE BOX
Indication for Geriatrics and Palliative Care Services/INTERVAL HPI: Complex decision making in setting of advanced illness  SUBJECTIVE AND OBJECTIVE: Pt seen and examined at bedside with wife present. Pt confused and unable to provide history. See below for GOC discussion with wife.    OVERNIGHT EVENTS: Pt requiring increasing pressor support.     DNR on chart:DNI  DNI      Allergies    No Known Allergies    Intolerances    atorvastatin (Muscle Pain (Severe))  MEDICATIONS  (STANDING):  aspirin enteric coated 81 milliGRAM(s) Oral daily  buMETAnide 2 milliGRAM(s) Oral <User Schedule>  chlorhexidine 2% Cloths 1 Application(s) Topical daily  clopidogrel Tablet 75 milliGRAM(s) Oral daily  DOBUTamine Infusion 7.5 MICROgram(s)/kG/Min (8.6 mL/Hr) IV Continuous <Continuous>  melatonin 5 milliGRAM(s) Oral at bedtime  mupirocin 2% Nasal 1 Application(s) Both Nostrils two times a day  polyethylene glycol 3350 17 Gram(s) Oral daily  senna 2 Tablet(s) Oral at bedtime  vasopressin Infusion 0.02 Unit(s)/Min (3 mL/Hr) IV Continuous <Continuous>    MEDICATIONS  (PRN):      ITEMS UNCHECKED ARE NOT PRESENT    PRESENT SYMPTOMS: [ x]Unable to self-report - see [ ] CPOT [ ] PAINADS [ ] RDOS  Source if other than patient:  [ ]Family   [x ]Team     Pain:  [ ]yes [ ]no  QOL impact -   Location -                    Aggravating factors -  Quality -  Radiation -  Timing-  Severity (0-10 scale):  Minimal acceptable level (0-10 scale):     CPOT:    https://www.[facility].org/getattachment/[email or web address]/Critical-Care-Pain-Observation-Tool-(CPOT)    Dyspnea:                           [ ]Mild [ ]Moderate [ ]Severe  Anxiety:                             [ ]Mild [ ]Moderate [ ]Severe  Fatigue:                             [ ]Mild [ ]Moderate [ ]Severe  Nausea:                             [ ]Mild [ ]Moderate [ ]Severe  Loss of appetite:              [ ]Mild [ ]Moderate [ ]Severe  Constipation:                    [ ]Mild [ ]Moderate [ ]Severe    PCSSQ[Palliative Care Spiritual Screening Question]   Severity (0-10):  Score of 4 or > indicate consideration of Chaplaincy referral.  Chaplaincy Referral: [ ] yes [ ] refused [x ] following [ ] Deferred     Caregiver [place]? : [x ] yes [ ] no [ ] Deferred [ ] Declined             Social work referral [ x] Patient & Family Centered Care Referral [ ]     Anticipatory Grief present?:  [x ] yes [ ] no  [ ] Deferred                  Social work referral [ x] Chaplaincy Referral[ ]      Other Symptoms:  [ ]All other review of systems negative   [x ]Unable to obtain due to poor mentation    Palliative Performance Status Version 2:      30   %      http://npcrc.org/files/news/palliative_performance_scale_ppsv2.pdf  PHYSICAL EXAM:  Vital Signs Last 24 Hrs  T(C): 36.8 (13 Sep 2024 12:00), Max: 36.8 (13 Sep 2024 12:00)  T(F): 98.2 (13 Sep 2024 12:00), Max: 98.2 (13 Sep 2024 12:00)  HR: 80 (13 Sep 2024 13:30) (77 - 99)  BP: 94/54 (13 Sep 2024 13:30) (67/35 - 109/48)  BP(mean): 68 (13 Sep 2024 13:30) (45 - 82)  RR: 26 (13 Sep 2024 13:30) (12 - 97)  SpO2: 98% (13 Sep 2024 13:30) (87% - 100%)    Parameters below as of 13 Sep 2024 13:00  Patient On (Oxygen Delivery Method): room air     I&O's Summary    12 Sep 2024 07:01  -  13 Sep 2024 07:00  --------------------------------------------------------  IN: 883.1 mL / OUT: 2820 mL / NET: -1936.9 mL    13 Sep 2024 07:01  -  13 Sep 2024 13:58  --------------------------------------------------------  IN: 231.6 mL / OUT: 275 mL / NET: -43.4 mL       GENERAL:  [ ]Alert  [x ]Oriented x 1  [ ]Lethargic  [ ]Cachexia  [ ]Unarousable  [x]Verbal  [ ]Non-Verbal  Behavioral:   [ ]Anxiety  [ ]Delirium [ ]Agitation [ ]Other  HEENT:  [x]Normal   [ ]Dry mouth   [ ]ET Tube/Trach  [ ]Oral lesions  PULMONARY:   [x]Clear [ ]Tachypnea  [ ]Audible excessive secretions   [ ]Rhonchi        [ ]Right [ ]Left [ ]Bilateral  [ ]Crackles        [ ]Right [ ]Left [ ]Bilateral  [ ]Wheezing     [ ]Right [ ]Left [ ]Bilateral  [ ]Diminished BS [ ] Right [ ]Left [ ]Bilateral  CARDIOVASCULAR:    [x]Regular [ ]Irregular [ ]Tachy  [ ]Christiano [ ]Murmur [ ]Other  GASTROINTESTINAL:  [x]Soft  [ ]Distended   [x]+BS  [x]Non tender [ ]Tender  [ ]PEG [ ]OGT/ NGT   Last BM:    GENITOURINARY:  [x]Normal [ ]Incontinent   [ ]Oliguria/Anuria   [x ]Chandler  MUSCULOSKELETAL:   [ ]Normal   [x]Weakness  [ ]Bed/Wheelchair bound [ ]Edema  NEUROLOGIC:   [ ]No focal deficits  [x ] Cognitive impairment  [ ] Dysphagia [ ]Dysarthria [ ] Paresis [ ]Other   SKIN:   [x]Normal  [ ]Rash   [ ]Pressure ulcer(s) [ ]y [ ]n present on admission    CRITICAL CARE:  [x ] Shock Present  [ ]Septic [x ]Cardiogenic [ ]Neurologic [ ]Hypovolemic  [ ]  Vasopressors [x ]  Inotropes   [ ]Respiratory failure present [ ]Mechanical ventilation [ ]Non-invasive ventilatory support [ ]High flow    [ ]Acute  [ ]Chronic [ ]Hypoxic  [ ]Hypercarbic [ ]Other  [ ]Other organ failure     LABS:                        7.7    3.94  )-----------( 67       ( 13 Sep 2024 00:59 )             23.4   09-13    129<L>  |  88<L>  |  96<H>  ----------------------------<  138<H>  3.7   |  22  |  4.09<H>    Ca    8.8      13 Sep 2024 00:59  Phos  5.8     09-13  Mg     2.2     09-13    TPro  6.4  /  Alb  2.8<L>  /  TBili  2.4<H>  /  DBili  x   /  AST  57<H>  /  ALT  26  /  AlkPhos  215<H>  09-13  PT/INR - ( 13 Sep 2024 00:59 )   PT: 13.8 sec;   INR: 1.33 ratio         PTT - ( 13 Sep 2024 00:59 )  PTT:28.6 sec    Urinalysis Basic - ( 13 Sep 2024 00:59 )    Color: x / Appearance: x / SG: x / pH: x  Gluc: 138 mg/dL / Ketone: x  / Bili: x / Urobili: x   Blood: x / Protein: x / Nitrite: x   Leuk Esterase: x / RBC: x / WBC x   Sq Epi: x / Non Sq Epi: x / Bacteria: x      RADIOLOGY & ADDITIONAL STUDIES:  < from: CT Head No Cont (09.07.24 @ 11:16) >    ACC: [id number] EXAM:  CT BRAIN   ORDERED BY:  DECLAN YOUNG     PROCEDURE DATE:  09/07/2024          INTERPRETATION:  CT HEAD    CLINICAL HISTORY: fall    TECHNIQUE:  Noncontrast CT.  Axial Acquisition.  Sagittal and coronal reformations.    COMPARISON:  No prior studies for comparison    FINDINGS:  HEMORRHAGE:  No evidence of intracranial hemorrhage.  BRAIN PARENCHYMA:  Mild atrophy. Mild periventricular white matter   ischemic changes. Stable dense calcification left basal ganglia.  No mass   or mass effect.  VENTRICLES / SHIFT:  No hydrocephalus. No midline shift.  EXTRA-AXIAL / BASAL CISTERNS:  No extra-axial mass. Basal cisterns   preserved.  Atherosclerotic calcifications of the cavernous internal   carotid arteries.  CALVARIUM AND EXTRACRANIAL SOFT TISSUES:  No depressed calvarial fracture.  SINUSES, ORBITS, MASTOIDS:  The visualized paranasal sinuses and mastoid   air cells are well aerated.    IMPRESSION:  No evidence of an acute traumatic intracranial injury.    --- End of Report ---            ROMANA TA MD; Attending Radiologist  This document has been electronically signed. Sep  7 2024 11:55AM    < end of copied text >      Protein Calorie Malnutrition Present: [ ]mild [ ]moderate [ ]severe [ ]underweight [ ]morbid obesity  https://www.andeal.org/vault/2440/web/files/ONC/Table_Clinical%20Characteristics%20to%20Document%20Malnutrition-White%20JV%20et%20al%202012.pdf    Height (cm): 182.9 (09-07-24 @ 22:22), 182.9 (09-07-24 @ 10:27), 182.9 (08-05-24 @ 09:41)  Weight (kg): 76.4 (09-07-24 @ 22:22), 74.8 (09-07-24 @ 10:27), 70.3 (08-05-24 @ 09:41)  BMI (kg/m2): 22.8 (09-07-24 @ 22:22), 22.4 (09-07-24 @ 10:27), 21 (08-05-24 @ 09:41)    [ ]PPSV2 < or = 30%  [ ]significant weight loss [ ]poor nutritional intake [ ]anasarca[ ]Artificial Nutrition    Other REFERRALS:  [ ]Hospice  [ ]Child Life  [ ]Social Work  [ ]Case management [ ]Holistic Therapy     Goals of Care Document:   Indication for Geriatrics and Palliative Care Services/INTERVAL HPI: Complex decision making in setting of advanced illness  SUBJECTIVE AND OBJECTIVE: Pt seen and examined at bedside with wife present. Pt confused and unable to provide history. See below for GOC discussion with wife.    OVERNIGHT EVENTS: Pt requiring increasing pressor support.     DNR on chart:DNI    Allergies  No Known Allergies    Intolerances  atorvastatin (Muscle Pain (Severe))    MEDICATIONS  (STANDING):  aspirin enteric coated 81 milliGRAM(s) Oral daily  buMETAnide 2 milliGRAM(s) Oral <User Schedule>  chlorhexidine 2% Cloths 1 Application(s) Topical daily  clopidogrel Tablet 75 milliGRAM(s) Oral daily  DOBUTamine Infusion 7.5 MICROgram(s)/kG/Min (8.6 mL/Hr) IV Continuous <Continuous>  melatonin 5 milliGRAM(s) Oral at bedtime  mupirocin 2% Nasal 1 Application(s) Both Nostrils two times a day  polyethylene glycol 3350 17 Gram(s) Oral daily  senna 2 Tablet(s) Oral at bedtime  vasopressin Infusion 0.02 Unit(s)/Min (3 mL/Hr) IV Continuous <Continuous>    MEDICATIONS  (PRN):      ITEMS UNCHECKED ARE NOT PRESENT    PRESENT SYMPTOMS: [ x]Unable to self-report - see [ ] CPOT [ ] PAINADS [ ] RDOS  Source if other than patient:  [ ]Family   [x ]Team     Pain:  [ ]yes [ ]no  QOL impact -   Location -                    Aggravating factors -  Quality -  Radiation -  Timing-  Severity (0-10 scale):  Minimal acceptable level (0-10 scale):     CPOT:    https://www.[facility].org/getattachment/[email or web address]/Critical-Care-Pain-Observation-Tool-(CPOT)    Dyspnea:                           [ ]Mild [ ]Moderate [ ]Severe  Anxiety:                             [ ]Mild [ ]Moderate [ ]Severe  Fatigue:                             [ ]Mild [ ]Moderate [ ]Severe  Nausea:                             [ ]Mild [ ]Moderate [ ]Severe  Loss of appetite:              [ ]Mild [ ]Moderate [ ]Severe  Constipation:                    [ ]Mild [ ]Moderate [ ]Severe    PCSSQ[Palliative Care Spiritual Screening Question]   Severity (0-10):  Score of 4 or > indicate consideration of Chaplaincy referral.  Chaplaincy Referral: [ ] yes [ ] refused [x ] following [ ] Deferred     Caregiver [place]? : [x ] yes [ ] no [ ] Deferred [ ] Declined             Social work referral [ x] Patient & Family Centered Care Referral [ ]     Anticipatory Grief present?:  [x ] yes [ ] no  [ ] Deferred                  Social work referral [ x] Chaplaincy Referral[ ]      Other Symptoms:  [ ]All other review of systems negative   [x ]Unable to obtain due to poor mentation    Palliative Performance Status Version 2:      30   %      http://[facility].org/files/news/palliative_performance_scale_ppsv2.pdf  PHYSICAL EXAM:  Vital Signs Last 24 Hrs  T(C): 36.8 (13 Sep 2024 12:00), Max: 36.8 (13 Sep 2024 12:00)  T(F): 98.2 (13 Sep 2024 12:00), Max: 98.2 (13 Sep 2024 12:00)  HR: 80 (13 Sep 2024 13:30) (77 - 99)  BP: 94/54 (13 Sep 2024 13:30) (67/35 - 109/48)  BP(mean): 68 (13 Sep 2024 13:30) (45 - 82)  RR: 26 (13 Sep 2024 13:30) (12 - 97)  SpO2: 98% (13 Sep 2024 13:30) (87% - 100%)    Parameters below as of 13 Sep 2024 13:00  Patient On (Oxygen Delivery Method): room air     I&O's Summary    12 Sep 2024 07:01  -  13 Sep 2024 07:00  --------------------------------------------------------  IN: 883.1 mL / OUT: 2820 mL / NET: -1936.9 mL    13 Sep 2024 07:01  -  13 Sep 2024 13:58  --------------------------------------------------------  IN: 231.6 mL / OUT: 275 mL / NET: -43.4 mL       GENERAL:  [ ]Alert  [x ]Oriented x 1  [ ]Lethargic  [ ]Cachexia  [ ]Unarousable  [x]Verbal  [ ]Non-Verbal  Behavioral:   [ ]Anxiety  [ ]Delirium [ ]Agitation [ ]Other  HEENT:  [x]Normal   [ ]Dry mouth   [ ]ET Tube/Trach  [ ]Oral lesions  PULMONARY:   [x]Clear [ ]Tachypnea  [ ]Audible excessive secretions   [ ]Rhonchi        [ ]Right [ ]Left [ ]Bilateral  [ ]Crackles        [ ]Right [ ]Left [ ]Bilateral  [ ]Wheezing     [ ]Right [ ]Left [ ]Bilateral  [ ]Diminished BS [ ] Right [ ]Left [ ]Bilateral  CARDIOVASCULAR:    [x]Regular [ ]Irregular [ ]Tachy  [ ]Christiano [ ]Murmur [ ]Other  GASTROINTESTINAL:  [x]Soft  [ ]Distended   [x]+BS  [x]Non tender [ ]Tender  [ ]PEG [ ]OGT/ NGT   Last BM:    GENITOURINARY:  [x]Normal [ ]Incontinent   [ ]Oliguria/Anuria   [x ]Chandler  MUSCULOSKELETAL:   [ ]Normal   [x]Weakness  [ ]Bed/Wheelchair bound [ ]Edema  NEUROLOGIC:   [ ]No focal deficits  [x ] Cognitive impairment  [ ] Dysphagia [ ]Dysarthria [ ] Paresis [ ]Other   SKIN:   [x]Normal  [ ]Rash   [ ]Pressure ulcer(s) [ ]y [ ]n present on admission    CRITICAL CARE:  [x ] Shock Present  [ ]Septic [x ]Cardiogenic [ ]Neurologic [ ]Hypovolemic  [ ]  Vasopressors [x ]  Inotropes   [ ]Respiratory failure present [ ]Mechanical ventilation [ ]Non-invasive ventilatory support [ ]High flow    [ ]Acute  [ ]Chronic [ ]Hypoxic  [ ]Hypercarbic [ ]Other  [ ]Other organ failure     LABS:                        7.7    3.94  )-----------( 67       ( 13 Sep 2024 00:59 )             23.4   09-13    129<L>  |  88<L>  |  96<H>  ----------------------------<  138<H>  3.7   |  22  |  4.09<H>    Ca    8.8      13 Sep 2024 00:59  Phos  5.8     09-13  Mg     2.2     09-13    TPro  6.4  /  Alb  2.8<L>  /  TBili  2.4<H>  /  DBili  x   /  AST  57<H>  /  ALT  26  /  AlkPhos  215<H>  09-13  PT/INR - ( 13 Sep 2024 00:59 )   PT: 13.8 sec;   INR: 1.33 ratio         PTT - ( 13 Sep 2024 00:59 )  PTT:28.6 sec    Urinalysis Basic - ( 13 Sep 2024 00:59 )    Color: x / Appearance: x / SG: x / pH: x  Gluc: 138 mg/dL / Ketone: x  / Bili: x / Urobili: x   Blood: x / Protein: x / Nitrite: x   Leuk Esterase: x / RBC: x / WBC x   Sq Epi: x / Non Sq Epi: x / Bacteria: x      RADIOLOGY & ADDITIONAL STUDIES:  < from: CT Head No Cont (09.07.24 @ 11:16) >    ACC: [id number] EXAM:  CT BRAIN   ORDERED BY:  DECLAN YOUNG     PROCEDURE DATE:  09/07/2024          INTERPRETATION:  CT HEAD    CLINICAL HISTORY: fall    TECHNIQUE:  Noncontrast CT.  Axial Acquisition.  Sagittal and coronal reformations.    COMPARISON:  No prior studies for comparison    FINDINGS:  HEMORRHAGE:  No evidence of intracranial hemorrhage.  BRAIN PARENCHYMA:  Mild atrophy. Mild periventricular white matter   ischemic changes. Stable dense calcification left basal ganglia.  No mass   or mass effect.  VENTRICLES / SHIFT:  No hydrocephalus. No midline shift.  EXTRA-AXIAL / BASAL CISTERNS:  No extra-axial mass. Basal cisterns   preserved.  Atherosclerotic calcifications of the cavernous internal   carotid arteries.  CALVARIUM AND EXTRACRANIAL SOFT TISSUES:  No depressed calvarial fracture.  SINUSES, ORBITS, MASTOIDS:  The visualized paranasal sinuses and mastoid   air cells are well aerated.    IMPRESSION:  No evidence of an acute traumatic intracranial injury.    --- End of Report ---            ROMANA TA MD; Attending Radiologist  This document has been electronically signed. Sep  7 2024 11:55AM    < end of copied text >      Protein Calorie Malnutrition Present: [ ]mild [ ]moderate [ ]severe [ ]underweight [ ]morbid obesity  https://www.andeal.org/vault/2440/web/files/ONC/Table_Clinical%20Characteristics%20to%20Document%20Malnutrition-White%20JV%20et%20al%044794.pdf    Height (cm): 182.9 (09-07-24 @ 22:22), 182.9 (09-07-24 @ 10:27), 182.9 (08-05-24 @ 09:41)  Weight (kg): 76.4 (09-07-24 @ 22:22), 74.8 (09-07-24 @ 10:27), 70.3 (08-05-24 @ 09:41)  BMI (kg/m2): 22.8 (09-07-24 @ 22:22), 22.4 (09-07-24 @ 10:27), 21 (08-05-24 @ 09:41)    [ ]PPSV2 < or = 30%  [ ]significant weight loss [ ]poor nutritional intake [ ]anasarca[ ]Artificial Nutrition    Other REFERRALS:  [ ]Hospice  [ ]Child Life  [ ]Social Work  [ ]Case management [ ]Holistic Therapy     Goals of Care Document:

## 2024-09-13 NOTE — PROGRESS NOTE ADULT - SUBJECTIVE AND OBJECTIVE BOX
Seen in CCU, on RA, episodes of agitation    Vital Signs Last 24 Hrs  T(C): 36.5 (09-13-24 @ 08:00), Max: 36.7 (09-12-24 @ 19:00)  T(F): 97.7 (09-13-24 @ 08:00), Max: 98.1 (09-12-24 @ 19:00)  HR: 85 (09-13-24 @ 10:30) (77 - 99)  BP: 93/52 (09-13-24 @ 10:30) (67/35 - 108/64)  BP(mean): 68 (09-13-24 @ 10:30) (45 - 82)  RR: 30 (09-13-24 @ 10:30) (12 - 97)  SpO2: 96% (09-13-24 @ 10:30) (87% - 100%)    I&O's Detail    12 Sep 2024 07:01  -  13 Sep 2024 07:00  --------------------------------------------------------  IN:    DOBUTamine: 206.4 mL    IV PiggyBack: 250 mL    Oral Fluid: 300 mL    Vasopressin: 126.7 mL  Total IN: 883.1 mL    OUT:    Indwelling Catheter - Urethral (mL): 2820 mL  Total OUT: 2820 mL    13 Sep 2024 07:01  -  13 Sep 2024 11:02  --------------------------------------------------------  IN:    DOBUTamine: 25.8 mL    Oral Fluid: 60 mL    Vasopressin: 22.5 mL  Total IN: 108.3 mL    OUT:    Indwelling Catheter - Urethral (mL): 170 mL  Total OUT: 170 mL    Respiratory b/l air entry  Cardiovascular S1 S2  Gastrointestinal soft, ND  Extremities no edema                                              7.7    3.94  )-----------( 67       ( 13 Sep 2024 00:59 )             23.4     13 Sep 2024 00:59    129    |  88     |  96     ----------------------------<  138    3.7     |  22     |  4.09     Ca    8.8        13 Sep 2024 00:59  Phos  5.8       13 Sep 2024 00:59  Mg     2.2       13 Sep 2024 00:59    TPro  6.4    /  Alb  2.8    /  TBili  2.4    /  DBili  x      /  AST  57     /  ALT  26     /  AlkPhos  215    13 Sep 2024 00:59    LIVER FUNCTIONS - ( 13 Sep 2024 00:59 )  Alb: 2.8 g/dL / Pro: 6.4 g/dL / ALK PHOS: 215 U/L / ALT: 26 U/L / AST: 57 U/L / GGT: x           PT/INR - ( 13 Sep 2024 00:59 )   PT: 13.8 sec;   INR: 1.33 ratio      aspirin enteric coated 81 milliGRAM(s) Oral daily  buMETAnide 2 milliGRAM(s) Oral <User Schedule>  chlorhexidine 2% Cloths 1 Application(s) Topical daily  clopidogrel Tablet 75 milliGRAM(s) Oral daily  DOBUTamine Infusion 7.5 MICROgram(s)/kG/Min IV Continuous <Continuous>  melatonin 5 milliGRAM(s) Oral at bedtime  mupirocin 2% Nasal 1 Application(s) Both Nostrils two times a day  polyethylene glycol 3350 17 Gram(s) Oral daily  senna 2 Tablet(s) Oral at bedtime  vasopressin Infusion 0.02 Unit(s)/Min IV Continuous <Continuous>    Assessment and Recommendation:     CM, EF ~ 20 - 25%, mod MR, lung ca  Cardio-renal/hypotensive BRODY/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

## 2024-09-14 NOTE — PROGRESS NOTE ADULT - ATTENDING COMMENTS
Attending Addendum:    80 yo M with end stage cardiomyopathy on palliative Dobutamine at home  Admitted with worsening cardiogenic shock    Yesterday's discussions with the patient and his wife noted.  Patient is now DNR with no escalation of care.  Remains on Dobutamine and max dose Vasopressin.    More lethargic, intermittently obtunded today.  Seems comfortable with prn Dilaudid and Ativan.  NPO in setting of worsening mental status.  Will continue diuretics in setting of hypervolemia to help with respiratory comfort.  Holding lab draws/Xrays/further interventions.    Support provided to patient's wife at bedside.    Critical care time (exclusive of procedures / education): 35 minutes    -Eden Devlin MD

## 2024-09-14 NOTE — PROGRESS NOTE ADULT - ASSESSMENT
79-year-old male with a history of HFrEF on home dobutamine 7.5 with last EF less than 20%, CAD, lungs mass - SCC, and urinary retention presented transferred from clinical hospital for management failure team after having syncopal episode last night, admitted for cardiogenic shock, now DNR/DNI with no escalation of care.     Neuro  - A&0 x1  - CT head post syncopal episode with no acute injury  - continue to monitor mental status per protocol  - Dc'ed PO medications ASA, plavix, bowel regimen as patient too lethargic to tolerate  - hydromorphone & ativan prn for symptom management    Respiratory  #hx lung mass, SCC c/b fluid overload  - Required CPAP 10/5 50% weaned off to NC 2 L; now RA  - saturating well   - continue to monitor spO2    Cardiovascular  #HFrEF (last know EF%), acute decompensated heart failure  - admitted with home dobutamine @ 7.5 mcg increased to 10 mcg, weaned to 7.5  - currently on Vasopressin 0.1 MAP goal 65-70  - now capped on pressors after GOC discussion  - Bumex 2mg IV TID   - holding GDMT i/s/o shock  - continue strict I&O  - Plan for no further escalation of care  - no further lab draws, xrays, further interventions    #CAD  - d/c DAPT for inability to tolerate PO intake    Renal  BRODY on CKD  - likely i/s/o cardiogenic shock  - strict I&O, no longer trending labs    GI  - diet as tolerated  - monitor for BM    Endo  - no further glucose trending as per GOC    Heme  - Hgb 7.7, likely 2/2 to volume overload, will ctm  - platelets low  but appears chronic  DVT ppx: SCDs, given thrombocytopenia    ID  - hypothermic on admission  - f/u infectious w/u  - continue to trend wbc and fever curve  - Monitor off abx, afebrile     SKIN:  - new purpuric macules on back , with ecchymosis on lower back  - derm c/s, will CTM, conservative management per derm    #Ethics: DNR/ DNI  Lines: JENNIE Singleton from 9/8 -     ======================= DISPOSITION  =====================  [X] Critical   [ ] Guarded    [ ] Stable    [X] Maintain in CICU  [ ] Downgrade to Telemetry  [ ] Discharge Home    Patient requires continuous monitoring with bedside rhythm monitoring, pulse ox monitoring, and intermittent blood gas analysis. Care plan discussed with ICU care team. Patient remained critical and at risk for life threatening decompensation.  Patient seen, examined and plan discussed with CCU team during rounds.     I have personally provided 30 minutes of critical care time excluding time spent on separate procedures, in addition to initial critical care time provided by the CICU Attending, Dr. Devlin.     Ebony Lopez, [facility]-BC

## 2024-09-14 NOTE — PROGRESS NOTE ADULT - SUBJECTIVE AND OBJECTIVE BOX
79-year-old male with a history of HFrEF on home dobutamine 7.5 with last EF less than 20%, CAD, lungs mass - SCC, and urinary retention presented transferred from [facility] hospital for management failure team after having syncopal episode last night, admitted for cardiogenic shock.    INTERVAL HPI/OVERNIGHT EVENTS:    SUBJECTIVE: Patient seen and examined at bedside.     ROS: All negative except as listed above.    VITAL SIGNS:  ICU Vital Signs Last 24 Hrs  T(C): 36.2 (14 Sep 2024 08:00), Max: 37 (13 Sep 2024 23:00)  T(F): 97.2 (14 Sep 2024 08:00), Max: 98.6 (13 Sep 2024 23:00)  HR: 75 (14 Sep 2024 08:00) (75 - 90)  BP: 112/69 (14 Sep 2024 08:00) (67/42 - 112/69)  BP(mean): 79 (14 Sep 2024 08:00) (48 - 79)  ABP: --  ABP(mean): --  RR: 14 (14 Sep 2024 08:00) (11 - 34)  SpO2: 95% (14 Sep 2024 08:00) (90% - 100%)    O2 Parameters below as of 14 Sep 2024 08:00  Patient On (Oxygen Delivery Method): room air            Plateau pressure:   P/F ratio:     09-13 @ 07:01  -  09-14 @ 07:00  --------------------------------------------------------  IN: 716.4 mL / OUT: 1050 mL / NET: -333.6 mL    09-14 @ 07:01  -  09-14 @ 08:21  --------------------------------------------------------  IN: 23.6 mL / OUT: 0 mL / NET: 23.6 mL      CAPILLARY BLOOD GLUCOSE          ECG: reviewed.    PHYSICAL EXAM:    GENERAL: NAD, lying in bed comfortably  HEAD:  Atraumatic, normocephalic  EYES: EOMI, PERRLA, conjunctiva and sclera clear  NECK: Supple, trachea midline, no JVD  HEART: Regular rate and rhythm, no murmurs, rubs, or gallops  LUNGS: Unlabored respirations.  Clear to auscultation bilaterally, no crackles, wheezing, or rhonchi  ABDOMEN: Soft, nontender, nondistended, +BS  EXTREMITIES: 2+ peripheral pulses bilaterally, cap refill<2 secs. No clubbing, cyanosis, or edema  NERVOUS SYSTEM:  A&Ox3, following commands, moving all extremities, no focal deficits   SKIN: No rashes or lesions    MEDICATIONS:  MEDICATIONS  (STANDING):  aspirin enteric coated 81 milliGRAM(s) Oral daily  buMETAnide Injectable 2 milliGRAM(s) IV Push <User Schedule>  chlorhexidine 2% Cloths 1 Application(s) Topical daily  clopidogrel Tablet 75 milliGRAM(s) Oral daily  DOBUTamine Infusion 7.5 MICROgram(s)/kG/Min (8.6 mL/Hr) IV Continuous <Continuous>  melatonin 5 milliGRAM(s) Oral at bedtime  polyethylene glycol 3350 17 Gram(s) Oral daily  senna 2 Tablet(s) Oral at bedtime  vasopressin Infusion 0.02 Unit(s)/Min (3 mL/Hr) IV Continuous <Continuous>    MEDICATIONS  (PRN):  HYDROmorphone  Injectable 0.2 milliGRAM(s) IV Push every 30 minutes PRN Moderate Pain (4 - 6)  LORazepam   Injectable 0.5 milliGRAM(s) IV Push every 4 hours PRN Anxiety      ALLERGIES:  Allergies    No Known Allergies    Intolerances    atorvastatin (Muscle Pain (Severe))      LABS:                        8.0    5.14  )-----------( 69       ( 14 Sep 2024 00:37 )             24.4     09-14    126<L>  |  86<L>  |  100<H>  ----------------------------<  163<H>  3.8   |  22  |  4.27<H>    Ca    8.8      14 Sep 2024 00:37  Phos  7.4     09-14  Mg     2.2     09-14    TPro  6.4  /  Alb  2.9<L>  /  TBili  2.4<H>  /  DBili  x   /  AST  61<H>  /  ALT  29  /  AlkPhos  211<H>  09-14    PT/INR - ( 14 Sep 2024 00:37 )   PT: 14.5 sec;   INR: 1.33 ratio         PTT - ( 14 Sep 2024 00:37 )  PTT:26.6 sec  Urinalysis Basic - ( 14 Sep 2024 00:37 )    Color: x / Appearance: x / SG: x / pH: x  Gluc: 163 mg/dL / Ketone: x  / Bili: x / Urobili: x   Blood: x / Protein: x / Nitrite: x   Leuk Esterase: x / RBC: x / WBC x   Sq Epi: x / Non Sq Epi: x / Bacteria: x      ABG:      vBG:  pH, Venous: 7.42 (09-14-24 @ 00:23)  pCO2, Venous: 42 mmHg (09-14-24 @ 00:23)  pO2, Venous: 30 mmHg (09-14-24 @ 00:23)  HCO3, Venous: 27 mmol/L (09-14-24 @ 00:23)    Micro:    Culture - Blood (collected 09-07-24 @ 16:30)  Source: .Blood Blood  Final Report (09-12-24 @ 22:01):    No growth at 5 days    Culture - Blood (collected 09-07-24 @ 16:15)  Source: .Blood Blood  Final Report (09-12-24 @ 22:01):    No growth at 5 days          RADIOLOGY & ADDITIONAL TESTS: Reviewed. 79-year-old male with a history of HFrEF on home dobutamine 7.5 with last EF less than 20%, CAD, lungs mass - SCC, and urinary retention presented transferred from [facility] hospital for management failure team after having syncopal episode last night, admitted for cardiogenic shock.    INTERVAL HPI/OVERNIGHT EVENTS: No acute events overnight.    SUBJECTIVE: Patient seen and examined at bedside.     ROS: All negative except as listed above.    VITAL SIGNS:  ICU Vital Signs Last 24 Hrs  T(C): 36.2 (14 Sep 2024 08:00), Max: 37 (13 Sep 2024 23:00)  T(F): 97.2 (14 Sep 2024 08:00), Max: 98.6 (13 Sep 2024 23:00)  HR: 75 (14 Sep 2024 08:00) (75 - 90)  BP: 112/69 (14 Sep 2024 08:00) (67/42 - 112/69)  BP(mean): 79 (14 Sep 2024 08:00) (48 - 79)  ABP: --  ABP(mean): --  RR: 14 (14 Sep 2024 08:00) (11 - 34)  SpO2: 95% (14 Sep 2024 08:00) (90% - 100%)    O2 Parameters below as of 14 Sep 2024 08:00  Patient On (Oxygen Delivery Method): room air            Plateau pressure:   P/F ratio:     09-13 @ 07:01  -  09-14 @ 07:00  --------------------------------------------------------  IN: 716.4 mL / OUT: 1050 mL / NET: -333.6 mL    09-14 @ 07:01  -  09-14 @ 08:21  --------------------------------------------------------  IN: 23.6 mL / OUT: 0 mL / NET: 23.6 mL      CAPILLARY BLOOD GLUCOSE          ECG: reviewed.    PHYSICAL EXAM:    GENERAL: NAD, lying in bed comfortably  HEAD:  Atraumatic, normocephalic  EYES: EOMI, PERRLA, conjunctiva and sclera clear  NECK: Supple, trachea midline, no JVD  HEART: Regular rate and rhythm, no murmurs, rubs, or gallops  LUNGS: Unlabored respirations.  Clear to auscultation bilaterally, no crackles, wheezing, or rhonchi  ABDOMEN: Soft, nontender, nondistended, +BS  EXTREMITIES: 2+ peripheral pulses bilaterally, cap refill<2 secs. No clubbing, cyanosis, or edema  NERVOUS SYSTEM:  A&Ox3, following commands, moving all extremities, no focal deficits   SKIN: No rashes or lesions    MEDICATIONS:  MEDICATIONS  (STANDING):  aspirin enteric coated 81 milliGRAM(s) Oral daily  buMETAnide Injectable 2 milliGRAM(s) IV Push <User Schedule>  chlorhexidine 2% Cloths 1 Application(s) Topical daily  clopidogrel Tablet 75 milliGRAM(s) Oral daily  DOBUTamine Infusion 7.5 MICROgram(s)/kG/Min (8.6 mL/Hr) IV Continuous <Continuous>  melatonin 5 milliGRAM(s) Oral at bedtime  polyethylene glycol 3350 17 Gram(s) Oral daily  senna 2 Tablet(s) Oral at bedtime  vasopressin Infusion 0.02 Unit(s)/Min (3 mL/Hr) IV Continuous <Continuous>    MEDICATIONS  (PRN):  HYDROmorphone  Injectable 0.2 milliGRAM(s) IV Push every 30 minutes PRN Moderate Pain (4 - 6)  LORazepam   Injectable 0.5 milliGRAM(s) IV Push every 4 hours PRN Anxiety      ALLERGIES:  Allergies    No Known Allergies    Intolerances    atorvastatin (Muscle Pain (Severe))      LABS:                        8.0    5.14  )-----------( 69       ( 14 Sep 2024 00:37 )             24.4     09-14    126<L>  |  86<L>  |  100<H>  ----------------------------<  163<H>  3.8   |  22  |  4.27<H>    Ca    8.8      14 Sep 2024 00:37  Phos  7.4     09-14  Mg     2.2     09-14    TPro  6.4  /  Alb  2.9<L>  /  TBili  2.4<H>  /  DBili  x   /  AST  61<H>  /  ALT  29  /  AlkPhos  211<H>  09-14    PT/INR - ( 14 Sep 2024 00:37 )   PT: 14.5 sec;   INR: 1.33 ratio         PTT - ( 14 Sep 2024 00:37 )  PTT:26.6 sec  Urinalysis Basic - ( 14 Sep 2024 00:37 )    Color: x / Appearance: x / SG: x / pH: x  Gluc: 163 mg/dL / Ketone: x  / Bili: x / Urobili: x   Blood: x / Protein: x / Nitrite: x   Leuk Esterase: x / RBC: x / WBC x   Sq Epi: x / Non Sq Epi: x / Bacteria: x      ABG:      vBG:  pH, Venous: 7.42 (09-14-24 @ 00:23)  pCO2, Venous: 42 mmHg (09-14-24 @ 00:23)  pO2, Venous: 30 mmHg (09-14-24 @ 00:23)  HCO3, Venous: 27 mmol/L (09-14-24 @ 00:23)    Micro:    Culture - Blood (collected 09-07-24 @ 16:30)  Source: .Blood Blood  Final Report (09-12-24 @ 22:01):    No growth at 5 days    Culture - Blood (collected 09-07-24 @ 16:15)  Source: .Blood Blood  Final Report (09-12-24 @ 22:01):    No growth at 5 days          RADIOLOGY & ADDITIONAL TESTS: Reviewed. 79-year-old male with a history of HFrEF on home dobutamine 7.5 with last EF less than 20%, CAD, lungs mass - SCC, and urinary retention presented transferred from [facility] hospital for management failure team after having syncopal episode last night, admitted for cardiogenic shock.    INTERVAL HPI/OVERNIGHT EVENTS: No acute events overnight.    SUBJECTIVE: Patient seen and examined at bedside. Currently lethargic and unresponsive to commands.    ROS: All negative except as listed above.    VITAL SIGNS:  ICU Vital Signs Last 24 Hrs  T(C): 36.2 (14 Sep 2024 08:00), Max: 37 (13 Sep 2024 23:00)  T(F): 97.2 (14 Sep 2024 08:00), Max: 98.6 (13 Sep 2024 23:00)  HR: 75 (14 Sep 2024 08:00) (75 - 90)  BP: 112/69 (14 Sep 2024 08:00) (67/42 - 112/69)  BP(mean): 79 (14 Sep 2024 08:00) (48 - 79)  ABP: --  ABP(mean): --  RR: 14 (14 Sep 2024 08:00) (11 - 34)  SpO2: 95% (14 Sep 2024 08:00) (90% - 100%)    O2 Parameters below as of 14 Sep 2024 08:00  Patient On (Oxygen Delivery Method): room air            Plateau pressure:   P/F ratio:     09-13 @ 07:01  -  09-14 @ 07:00  --------------------------------------------------------  IN: 716.4 mL / OUT: 1050 mL / NET: -333.6 mL    09-14 @ 07:01  -  09-14 @ 08:21  --------------------------------------------------------  IN: 23.6 mL / OUT: 0 mL / NET: 23.6 mL      CAPILLARY BLOOD GLUCOSE          ECG: reviewed.    PHYSICAL EXAM:    GENERAL: Lethargic, somnolent, briefly arousing to touch  HEAD:  Atraumatic, normocephalic  EYES: EOMI, conjunctiva and sclera clear  NECK: Supple, trachea midline, no JVD  HEART: Regular rate and rhythm, no murmurs, rubs, or gallops  LUNGS: Unlabored respirations.  Clear to auscultation bilaterally, no crackles, wheezing, or rhonchi  ABDOMEN: Soft, nontender, nondistended  EXTREMITIES: No LE edema  NERVOUS SYSTEM: Lethargic, PERRLA  SKIN: No rashes or lesions    MEDICATIONS:  MEDICATIONS  (STANDING):  aspirin enteric coated 81 milliGRAM(s) Oral daily  buMETAnide Injectable 2 milliGRAM(s) IV Push <User Schedule>  chlorhexidine 2% Cloths 1 Application(s) Topical daily  clopidogrel Tablet 75 milliGRAM(s) Oral daily  DOBUTamine Infusion 7.5 MICROgram(s)/kG/Min (8.6 mL/Hr) IV Continuous <Continuous>  melatonin 5 milliGRAM(s) Oral at bedtime  polyethylene glycol 3350 17 Gram(s) Oral daily  senna 2 Tablet(s) Oral at bedtime  vasopressin Infusion 0.02 Unit(s)/Min (3 mL/Hr) IV Continuous <Continuous>    MEDICATIONS  (PRN):  HYDROmorphone  Injectable 0.2 milliGRAM(s) IV Push every 30 minutes PRN Moderate Pain (4 - 6)  LORazepam   Injectable 0.5 milliGRAM(s) IV Push every 4 hours PRN Anxiety      ALLERGIES:  Allergies    No Known Allergies    Intolerances    atorvastatin (Muscle Pain (Severe))      LABS:                        8.0    5.14  )-----------( 69       ( 14 Sep 2024 00:37 )             24.4     09-14    126<L>  |  86<L>  |  100<H>  ----------------------------<  163<H>  3.8   |  22  |  4.27<H>    Ca    8.8      14 Sep 2024 00:37  Phos  7.4     09-14  Mg     2.2     09-14    TPro  6.4  /  Alb  2.9<L>  /  TBili  2.4<H>  /  DBili  x   /  AST  61<H>  /  ALT  29  /  AlkPhos  211<H>  09-14    PT/INR - ( 14 Sep 2024 00:37 )   PT: 14.5 sec;   INR: 1.33 ratio         PTT - ( 14 Sep 2024 00:37 )  PTT:26.6 sec  Urinalysis Basic - ( 14 Sep 2024 00:37 )    Color: x / Appearance: x / SG: x / pH: x  Gluc: 163 mg/dL / Ketone: x  / Bili: x / Urobili: x   Blood: x / Protein: x / Nitrite: x   Leuk Esterase: x / RBC: x / WBC x   Sq Epi: x / Non Sq Epi: x / Bacteria: x      ABG:      vBG:  pH, Venous: 7.42 (09-14-24 @ 00:23)  pCO2, Venous: 42 mmHg (09-14-24 @ 00:23)  pO2, Venous: 30 mmHg (09-14-24 @ 00:23)  HCO3, Venous: 27 mmol/L (09-14-24 @ 00:23)    Micro:    Culture - Blood (collected 09-07-24 @ 16:30)  Source: .Blood Blood  Final Report (09-12-24 @ 22:01):    No growth at 5 days    Culture - Blood (collected 09-07-24 @ 16:15)  Source: .Blood Blood  Final Report (09-12-24 @ 22:01):    No growth at 5 days          RADIOLOGY & ADDITIONAL TESTS: Reviewed.

## 2024-09-14 NOTE — PROGRESS NOTE ADULT - ASSESSMENT
79-year-old male with a history of HFrEF on home dobutamine 7.5 with last EF less than 20%, CAD, lungs mass - SCC, and urinary retention presented transferred from clinical hospital for management failure team after having syncopal episode last night, admitted for cardiogenic shock.    Neuro  - A&0 x1  - CT head post syncopal episode with no acute injury  - continue to monitor mental status per protocol    Respiratory  #hx lung mass, SCC c/b fluid overload  - Required CPAP 10/5 50% weaned off to NC 2 L; now RA  - saturating well   - continue to monitor spO2    Cardiovascular  #HFrEF (last know EF%), acute decompensated heart failure  - on home dobutamine @ 7.5 mcg increased to 10 mcg   - Vasopressin 0.1 MAP goal 65-70  - 2 Bumex PO TID, 2mg IV Push stat x 1 (CVP 25) 9/12; IV push 2mg Bumex at midnight  - Unable to float swan however has a cordis for monitoring of venous sats  - borderline BPs without room for afterload reduction  - holding GDMT i/s/o shock  - continue strict I&O  - continue to trend lactate and perfusion indices    #CAD  - on aspirin and plavix at home  - continue    Renal  BRODY on CKD  - sCr 4.09 uptrending, baseline usually around 2  - likely i/s/o cardiogenic shock  - strict I&O, trend renal function daily    GI  - diet as tolerated  - monitor for BM    Endo  - f/u a1c, trend glucose on cmp  - f/u tsh and lipid profile    Heme  - Hgb 7.7, likely 2/2 to volume overload, will ctm  - platelets low  but appears chronic  - continue to trend  DVT ppx: SCDs, given thrombocytopenia    ID  - hypothermic on admission  - f/u infectious w/u  - continue to trend wbc and fever curve  - received Vanc x1; 5 day course of cefepime empirically given hypothermia.     SKIN:  - new purpuric macules on back , with ecchymosis on lower back  - derm c/s, will CTM    #Ethics: DNR/ DNI    Lines: PIVs, RIJ Cordis from 9/8   79-year-old male with a history of HFrEF on home dobutamine 7.5 with last EF less than 20%, CAD, lungs mass - SCC, and urinary retention presented transferred from clinical hospital for management failure team after having syncopal episode last night, admitted for cardiogenic shock.    Neuro  - A&0 x1  - CT head post syncopal episode with no acute injury  - continue to monitor mental status per protocol    Respiratory  #hx lung mass, SCC c/b fluid overload  - Required CPAP 10/5 50% weaned off to NC 2 L; now RA  - saturating well   - continue to monitor spO2    Cardiovascular  #HFrEF (last know EF%), acute decompensated heart failure  - on home dobutamine @ 7.5 mcg increased to 10 mcg   - Vasopressin 0.1 MAP goal 65-70  - 2 Bumex IV TID. Net negative 300cc over last 24hrs  - Unable to float swan however has a cordis for monitoring of venous sats  - borderline BPs without room for afterload reduction  - holding GDMT i/s/o shock  - continue strict I&O  - continue to trend lactate and perfusion indices  - Plan for no further escalation of care  - Lactate and creatinine worsening likely in setting of shock    #CAD  - on aspirin and plavix at home  - continue    Renal  BRODY on CKD  - sCr 4.09 uptrending, baseline usually around 2  - likely i/s/o cardiogenic shock  - strict I&O, trend renal function daily    GI  - diet as tolerated  - monitor for BM    Endo  - f/u a1c, trend glucose on cmp  - f/u tsh and lipid profile    Heme  - Hgb 7.7, likely 2/2 to volume overload, will ctm  - platelets low  but appears chronic  - continue to trend  DVT ppx: SCDs, given thrombocytopenia    ID  - hypothermic on admission  - f/u infectious w/u  - continue to trend wbc and fever curve  -Monitor off abx, afebrile and no leukocytosis    SKIN:  - new purpuric macules on back , with ecchymosis on lower back  - derm c/s, will CTM, conservative management per derm    #Ethics: DNR/ DNI    Lines: PIVs, RIJ Cordis from 9/8   79-year-old male with a history of HFrEF on home dobutamine 7.5 with last EF less than 20%, CAD, lungs mass - SCC, and urinary retention presented transferred from clinical hospital for management failure team after having syncopal episode last night, admitted for cardiogenic shock.    Neuro  - A&0 x1  - CT head post syncopal episode with no acute injury  - continue to monitor mental status per protocol  - Dc'ed PO medications ASA, plavix, bowel regimen as patient too lethargic to tolerate    Respiratory  #hx lung mass, SCC c/b fluid overload  - Required CPAP 10/5 50% weaned off to NC 2 L; now RA  - saturating well   - continue to monitor spO2    Cardiovascular  #HFrEF (last know EF%), acute decompensated heart failure  - on home dobutamine @ 7.5 mcg increased to 10 mcg   - Vasopressin 0.1 MAP goal 65-70  - Bumex 2mg IV TID  - Unable to float swan however has a cordis for monitoring of venous sats  - borderline BPs without room for afterload reduction  - holding GDMT i/s/o shock  - continue strict I&O  - continue to trend lactate and perfusion indices  - Plan for no further escalation of care  - Lactate and creatinine worsening likely in setting of shock    #CAD  - on aspirin and plavix at home  - continue    Renal  BRODY on CKD  - sCr 4.09 uptrending, baseline usually around 2  - likely i/s/o cardiogenic shock  - strict I&O, trend renal function daily    GI  - diet as tolerated  - monitor for BM    Endo  - f/u a1c, trend glucose on cmp  - f/u tsh and lipid profile    Heme  - Hgb 7.7, likely 2/2 to volume overload, will ctm  - platelets low  but appears chronic  - continue to trend  DVT ppx: SCDs, given thrombocytopenia    ID  - hypothermic on admission  - f/u infectious w/u  - continue to trend wbc and fever curve  -Monitor off abx, afebrile and no leukocytosis    SKIN:  - new purpuric macules on back , with ecchymosis on lower back  - derm c/s, will CTM, conservative management per derm    #Ethics: DNR/ DNI    Lines: PIVs, RIJ Cordis from 9/8

## 2024-09-14 NOTE — PROGRESS NOTE ADULT - SUBJECTIVE AND OBJECTIVE BOX
PATIENT:  ELYSE MALAVE  [id number]    CHIEF COMPLAINT:  Patient is a 79y old  Male who presents with a chief complaint of cardiogenic shock (14 Sep 2024 08:21)      INTERVAL HISTORY: no acute events. lehtargic throughout the day    MEDICATIONS:  MEDICATIONS  (STANDING):  buMETAnide Injectable 2 milliGRAM(s) IV Push <User Schedule>  chlorhexidine 2% Cloths 1 Application(s) Topical daily  DOBUTamine Infusion 7.5 MICROgram(s)/kG/Min (8.6 mL/Hr) IV Continuous <Continuous>  melatonin 5 milliGRAM(s) Oral at bedtime  scopolamine 1 mG/72 Hr(s) Patch 1 Patch Transdermal every 72 hours  vasopressin Infusion 0.02 Unit(s)/Min (3 mL/Hr) IV Continuous <Continuous>    MEDICATIONS  (PRN):  HYDROmorphone  Injectable 0.2 milliGRAM(s) IV Push every 30 minutes PRN Moderate Pain (4 - 6)  LORazepam   Injectable 0.5 milliGRAM(s) IV Push every 4 hours PRN Anxiety      ALLERGIES:  Allergies    No Known Allergies    Intolerances    atorvastatin (Muscle Pain (Severe))      OBJECTIVE:  ICU Vital Signs Last 24 Hrs  T(C): 36.3 (14 Sep 2024 18:00), Max: 37 (13 Sep 2024 23:00)  T(F): 97.3 (14 Sep 2024 18:00), Max: 98.6 (13 Sep 2024 23:00)  HR: 75 (14 Sep 2024 18:00) (75 - 90)  BP: 89/52 (14 Sep 2024 18:00) (80/39 - 140/61)  BP(mean): 63 (14 Sep 2024 18:00) (55 - 88)  ABP: --  ABP(mean): --  RR: 21 (14 Sep 2024 18:00) (10 - 23)  SpO2: 98% (14 Sep 2024 18:00) (91% - 99%)    O2 Parameters below as of 14 Sep 2024 18:00  Patient On (Oxygen Delivery Method): room air    Adult Advanced Hemodynamics Last 24 Hrs  CVP(mm Hg): 21 (14 Sep 2024 06:00) (14 - 21)  CVP(cm H2O): --  CO: --  CI: --  PA: --  PA(mean): --  PCWP: --  SVR: --  SVRI: --  PVR: --  PVRI: --  CAPILLARY BLOOD GLUCOSE        CAPILLARY BLOOD GLUCOSE        I&O's Summary    13 Sep 2024 07:01  -  14 Sep 2024 07:00  --------------------------------------------------------  IN: 716.4 mL / OUT: 1050 mL / NET: -333.6 mL    14 Sep 2024 07:01  -  14 Sep 2024 21:18  --------------------------------------------------------  IN: 474 mL / OUT: 625 mL / NET: -151 mL      Daily     Daily Weight in k.9 (14 Sep 2024 03:00)    LABS:                          8.0    5.14  )-----------( 69       ( 14 Sep 2024 00:37 )             24.4     09-14    126<L>  |  86<L>  |  100<H>  ----------------------------<  163<H>  3.8   |  22  |  4.27<H>    Ca    8.8      14 Sep 2024 00:37  Phos  7.4     14  Mg     2.2     14    TPro  6.4  /  Alb  2.9<L>  /  TBili  2.4<H>  /  DBili  x   /  AST  61<H>  /  ALT  29  /  AlkPhos  211<H>  14    LIVER FUNCTIONS - ( 14 Sep 2024 00:37 )  Alb: 2.9 g/dL / Pro: 6.4 g/dL / ALK PHOS: 211 U/L / ALT: 29 U/L / AST: 61 U/L / GGT: x           PT/INR - ( 14 Sep 2024 00:37 )   PT: 14.5 sec;   INR: 1.33 ratio         PTT - ( 14 Sep 2024 00:37 )  PTT:26.6 sec    Urinalysis Basic - ( 14 Sep 2024 00:37 )    Color: x / Appearance: x / SG: x / pH: x  Gluc: 163 mg/dL / Ketone: x  / Bili: x / Urobili: x   Blood: x / Protein: x / Nitrite: x   Leuk Esterase: x / RBC: x / WBC x   Sq Epi: x / Non Sq Epi: x / Bacteria: x        TELEMETRY:     EKG:     IMAGING:

## 2024-09-15 NOTE — PROGRESS NOTE ADULT - SUBJECTIVE AND OBJECTIVE BOX
Admission date:  CHIEF COMPLAINT:  HPI:  79-year-old male with a history of HFrEF on home dobutamine 7.5 with last EF less than 20%, CAD, lungs mass - SCC, and urinary retention presented transferred from [facility] for management of heart failure after having syncopal episode last night.  Patient also having reduced urine output despite 6 mg of Bumex twice a day.  At [facility] patient had a workup which was notable for sodium of 124, creatinine to 4.6 from his baseline of approximately 2-2.1, proBNP 5220, lactate 1.9.  He was transferred here for further evaluation and  heart failure management, admitted to CICU.    On admission to CICU he is A&O, saturating well on room air, sinus rhythm 75 /59. He denies dizziness, chest pain, sob, N&V on admission. (07 Sep 2024 23:06)    INTERVAL HISTORY:    REVIEW OF SYSTEMS: Denies xxxxxx; all others negative    MEDICATIONS  (STANDING):  buMETAnide Injectable 2 milliGRAM(s) IV Push <User Schedule>  chlorhexidine 2% Cloths 1 Application(s) Topical daily  DOBUTamine Infusion 7.5 MICROgram(s)/kG/Min (8.6 mL/Hr) IV Continuous <Continuous>  melatonin 5 milliGRAM(s) Oral at bedtime  scopolamine 1 mG/72 Hr(s) Patch 1 Patch Transdermal every 72 hours  vasopressin Infusion 0.1 Unit(s)/Min (15 mL/Hr) IV Continuous <Continuous>    MEDICATIONS  (PRN):  HYDROmorphone  Injectable 0.2 milliGRAM(s) IV Push every 30 minutes PRN Moderate Pain (4 - 6)  LORazepam   Injectable 0.5 milliGRAM(s) IV Push every 4 hours PRN Anxiety      Objective:  ICU Vital Signs Last 24 Hrs  T(C): 36.6 (15 Sep 2024 12:00), Max: 36.6 (15 Sep 2024 12:00)  T(F): 97.9 (15 Sep 2024 12:00), Max: 97.9 (15 Sep 2024 12:00)  HR: 77 (15 Sep 2024 20:00) (73 - 79)  BP: 106/58 (15 Sep 2024 20:00) (95/53 - 138/57)  BP(mean): 76 (15 Sep 2024 20:00) (68 - 87)  ABP: --  ABP(mean): --  RR: 20 (15 Sep 2024 20:00) (18 - 23)  SpO2: 99% (15 Sep 2024 20:00) (95% - 100%)    O2 Parameters below as of 15 Sep 2024 16:00  Patient On (Oxygen Delivery Method): nasal cannula  O2 Flow (L/min): 2              09-14 @ 07:01  -  09-15 @ 07:00  --------------------------------------------------------  IN: 926.4 mL / OUT: 1325 mL / NET: -398.6 mL    09-15 @ 07:01  -  09-15 @ 21:45  --------------------------------------------------------  IN: 499.6 mL / OUT: 500 mL / NET: -0.4 mL      Daily     Daily     PHYSICAL EXAM:      Constitutional:    HEENT:    Respiratory:    Cardiovascular:  Access site:    Gastrointestinal:    Genitourinary:    Extremities:    Vascular:    Neurological:    Skin:      TELEMETRY:     EKG:     IMAGING:    Labs:                          8.0    5.14  )-----------( 69       ( 14 Sep 2024 00:37 )             24.4     09-14    126<L>  |  86<L>  |  100<H>  ----------------------------<  163<H>  3.8   |  22  |  4.27<H>    Ca    8.8      14 Sep 2024 00:37  Phos  7.4     09-14  Mg     2.2     09-14    TPro  6.4  /  Alb  2.9<L>  /  TBili  2.4<H>  /  DBili  x   /  AST  61<H>  /  ALT  29  /  AlkPhos  211<H>  09-14    LIVER FUNCTIONS - ( 14 Sep 2024 00:37 )  Alb: 2.9 g/dL / Pro: 6.4 g/dL / ALK PHOS: 211 U/L / ALT: 29 U/L / AST: 61 U/L / GGT: x           PT/INR - ( 14 Sep 2024 00:37 )   PT: 14.5 sec;   INR: 1.33 ratio         PTT - ( 14 Sep 2024 00:37 )  PTT:26.6 sec    Urinalysis Basic - ( 14 Sep 2024 00:37 )    Color: x / Appearance: x / SG: x / pH: x  Gluc: 163 mg/dL / Ketone: x  / Bili: x / Urobili: x   Blood: x / Protein: x / Nitrite: x   Leuk Esterase: x / RBC: x / WBC x   Sq Epi: x / Non Sq Epi: x / Bacteria: x        HEALTH ISSUES - PROBLEM Dx:  End stage heart failure    ACP (advance care planning)    Encounter for palliative care          Assessment and Plan:   · Assessment	  79-year-old male with a history of HFrEF on home dobutamine 7.5 with last EF less than 20%, CAD, lungs mass - SCC, and urinary retention presented transferred from clinical hospital for management failure team after having syncopal episode last night, admitted for cardiogenic shock, now DNR/DNI with no escalation of care.     Neuro  - A&0 x1  - CT head post syncopal episode with no acute injury  - continue to monitor mental status per protocol  - Dc'ed PO medications ASA, plavix, bowel regimen as patient too lethargic to tolerate  - hydromorphone & ativan prn for symptom management    Respiratory  #hx lung mass, SCC c/b fluid overload  - Required CPAP 10/5 50% weaned off to NC 2 L; now RA  - saturating well   - continue to monitor spO2    Cardiovascular  #HFrEF (last know EF%), acute decompensated heart failure  - admitted with home dobutamine @ 7.5 mcg increased to 10 mcg, weaned to 7.5  - currently on Vasopressin 0.1 MAP goal 65-70  - now capped on pressors after GOC discussion  - Bumex 2mg IV TID   - holding GDMT i/s/o shock  - continue strict I&O  - Plan for no further escalation of care  - no further lab draws, xrays, further interventions    #CAD  - d/c DAPT for inability to tolerate PO intake    Renal  BRODY on CKD  - likely i/s/o cardiogenic shock  - strict I&O, no longer trending labs    GI  - diet as tolerated  - monitor for BM    Endo  - no further glucose trending as per GOC    Heme  - Hgb 7.7, likely 2/2 to volume overload, will ctm  - platelets low  but appears chronic  DVT ppx: SCDs, given thrombocytopenia    ID  - hypothermic on admission  - f/u infectious w/u  - continue to trend wbc and fever curve  - Monitor off abx, afebrile     Patient requires continuous monitoring with bedside rhythm monitoring, pulse ox monitoring, and intermittent blood gas analysis. Care plan discussed with ICU care team. Patient remained critical and at risk for life threatening decompensation.  Patient seen, examined and plan discussed with CCU team during rounds.     I have personally provided 30 minutes of critical care time excluding time spent on separate procedures, in addition to initial critical care time provided by the CICU Attending, Dr. Devlin.

## 2024-09-15 NOTE — PROGRESS NOTE ADULT - ASSESSMENT
79-year-old male with a history of HFrEF on home dobutamine 7.5 with last EF less than 20%, CAD, lungs mass - SCC, and urinary retention presented transferred from clinical hospital for management failure team after having syncopal episode last night, admitted for cardiogenic shock.    Neuro  - A&0 x1  - CT head post syncopal episode with no acute injury  - continue to monitor mental status per protocol  - Dc'ed PO medications ASA, plavix, bowel regimen as patient too lethargic to tolerate    Respiratory  #hx lung mass, SCC c/b fluid overload  - Required CPAP 10/5 50% weaned off to NC 2 L; now RA  - saturating well   - continue to monitor spO2    Cardiovascular  #HFrEF (last know EF%), acute decompensated heart failure  - on home dobutamine @ 7.5 mcg increased to 10 mcg   - Vasopressin 0.1 MAP goal 65-70  - Bumex 2mg IV TID  - Unable to float swan however has a cordis for monitoring of venous sats  - borderline BPs without room for afterload reduction  - holding GDMT i/s/o shock  - continue strict I&O  - continue to trend lactate and perfusion indices  - Plan for no further escalation of care  - Lactate and creatinine worsening likely in setting of shock    #CAD  - on aspirin and plavix at home  - continue    Renal  BRODY on CKD  - sCr 4.09 uptrending, baseline usually around 2  - likely i/s/o cardiogenic shock  - strict I&O, trend renal function daily    GI  - diet as tolerated  - monitor for BM    Endo  - f/u a1c, trend glucose on cmp  - f/u tsh and lipid profile    Heme  - Hgb 7.7, likely 2/2 to volume overload, will ctm  - platelets low  but appears chronic  - continue to trend  DVT ppx: SCDs, given thrombocytopenia    ID  - hypothermic on admission  - f/u infectious w/u  - continue to trend wbc and fever curve  -Monitor off abx, afebrile and no leukocytosis    SKIN:  - new purpuric macules on back , with ecchymosis on lower back  - derm c/s, will CTM, conservative management per derm    #Ethics: DNR/ DNI    Lines: PIVs, RIJ Cordis from 9/8   79-year-old male with a history of HFrEF on home dobutamine 7.5 with last EF less than 20%, CAD, lungs mass - SCC, and urinary retention presented transferred from clinical hospital for management failure team after having syncopal episode last night, admitted for cardiogenic shock.    Neuro  - A&0 x2  - CT head post syncopal episode with no acute injury  - continue to monitor mental status per protocol  - Dc'ed PO medications ASA, plavix, bowel regimen as patient too lethargic to tolerate    Respiratory  #hx lung mass, SCC c/b fluid overload  - Required CPAP 10/5 50% weaned off to NC 2 L; now RA  - saturating well   - continue to monitor spO2    Cardiovascular  #HFrEF (last know EF%), acute decompensated heart failure  - on home dobutamine @ 7.5 mcg  - Vasopressin 0.1 MAP goal 65-70  - Bumex 2mg IV TID  - Unable to float swan however has a cordis for monitoring of venous sats  - borderline BPs without room for afterload reduction  - holding GDMT i/s/o shock  - continue strict I&O  - continue to trend lactate and perfusion indices  - Plan for no further escalation of care  - Lactate and creatinine worsening likely in setting of shock    #CAD  - on aspirin and plavix at home  - continue    Renal  BRODY on CKD  - sCr 4.09 uptrending, baseline usually around 2  - likely i/s/o cardiogenic shock  - strict I&O, trend renal function daily    GI  - diet as tolerated  - monitor for BM    Endo  - f/u a1c, trend glucose on cmp  - f/u tsh and lipid profile    Heme  - Hgb 7.7, likely 2/2 to volume overload, will ctm  - platelets low  but appears chronic  - continue to trend  DVT ppx: SCDs, given thrombocytopenia    ID  - hypothermic on admission  - f/u infectious w/u  - continue to trend wbc and fever curve  -Monitor off abx, afebrile and no leukocytosis    SKIN:  - new purpuric macules on back , with ecchymosis on lower back  - derm c/s, will CTM, conservative management per derm    #Ethics: DNR/ DNI    Lines: PIVs, RIJ Cordis from 9/8

## 2024-09-15 NOTE — PROGRESS NOTE ADULT - SUBJECTIVE AND OBJECTIVE BOX
INTERVAL HPI/OVERNIGHT EVENTS:    SUBJECTIVE: Patient seen and examined at bedside.     ROS: All negative except as listed above.    VITAL SIGNS:  ICU Vital Signs Last 24 Hrs  T(C): 36.3 (14 Sep 2024 18:00), Max: 36.6 (14 Sep 2024 12:00)  T(F): 97.3 (14 Sep 2024 18:00), Max: 97.9 (14 Sep 2024 12:00)  HR: 79 (15 Sep 2024 04:00) (75 - 80)  BP: 115/72 (15 Sep 2024 04:00) (83/50 - 140/61)  BP(mean): 87 (15 Sep 2024 04:00) (62 - 88)  ABP: --  ABP(mean): --  RR: 23 (15 Sep 2024 04:00) (10 - 23)  SpO2: 96% (15 Sep 2024 04:00) (91% - 98%)    O2 Parameters below as of 14 Sep 2024 18:00  Patient On (Oxygen Delivery Method): room air            Plateau pressure:   P/F ratio:     09-14 @ 07:01  -  09-15 @ 07:00  --------------------------------------------------------  IN: 926.4 mL / OUT: 1325 mL / NET: -398.6 mL      CAPILLARY BLOOD GLUCOSE          ECG: reviewed.    PHYSICAL EXAM:    GENERAL: NAD, lying in bed comfortably  HEAD:  Atraumatic, normocephalic  EYES: EOMI, PERRLA, conjunctiva and sclera clear  NECK: Supple, trachea midline, no JVD  HEART: Regular rate and rhythm, no murmurs, rubs, or gallops  LUNGS: Unlabored respirations.  Clear to auscultation bilaterally, no crackles, wheezing, or rhonchi  ABDOMEN: Soft, nontender, nondistended, +BS  EXTREMITIES: 2+ peripheral pulses bilaterally, cap refill<2 secs. No clubbing, cyanosis, or edema  NERVOUS SYSTEM:  A&Ox3, following commands, moving all extremities, no focal deficits   SKIN: No rashes or lesions    MEDICATIONS:  MEDICATIONS  (STANDING):  buMETAnide Injectable 2 milliGRAM(s) IV Push <User Schedule>  chlorhexidine 2% Cloths 1 Application(s) Topical daily  DOBUTamine Infusion 7.5 MICROgram(s)/kG/Min (8.6 mL/Hr) IV Continuous <Continuous>  melatonin 5 milliGRAM(s) Oral at bedtime  scopolamine 1 mG/72 Hr(s) Patch 1 Patch Transdermal every 72 hours  vasopressin Infusion 0.02 Unit(s)/Min (3 mL/Hr) IV Continuous <Continuous>    MEDICATIONS  (PRN):  HYDROmorphone  Injectable 0.2 milliGRAM(s) IV Push every 30 minutes PRN Moderate Pain (4 - 6)  LORazepam   Injectable 0.5 milliGRAM(s) IV Push every 4 hours PRN Anxiety      ALLERGIES:  Allergies    No Known Allergies    Intolerances    atorvastatin (Muscle Pain (Severe))      LABS:                        8.0    5.14  )-----------( 69       ( 14 Sep 2024 00:37 )             24.4     09-14    126<L>  |  86<L>  |  100<H>  ----------------------------<  163<H>  3.8   |  22  |  4.27<H>    Ca    8.8      14 Sep 2024 00:37  Phos  7.4     09-14  Mg     2.2     09-14    TPro  6.4  /  Alb  2.9<L>  /  TBili  2.4<H>  /  DBili  x   /  AST  61<H>  /  ALT  29  /  AlkPhos  211<H>  09-14    PT/INR - ( 14 Sep 2024 00:37 )   PT: 14.5 sec;   INR: 1.33 ratio         PTT - ( 14 Sep 2024 00:37 )  PTT:26.6 sec  Urinalysis Basic - ( 14 Sep 2024 00:37 )    Color: x / Appearance: x / SG: x / pH: x  Gluc: 163 mg/dL / Ketone: x  / Bili: x / Urobili: x   Blood: x / Protein: x / Nitrite: x   Leuk Esterase: x / RBC: x / WBC x   Sq Epi: x / Non Sq Epi: x / Bacteria: x      ABG:      vBG:    Micro:    Culture - Blood (collected 09-07-24 @ 16:30)  Source: .Blood Blood  Final Report (09-12-24 @ 22:01):    No growth at 5 days    Culture - Blood (collected 09-07-24 @ 16:15)  Source: .Blood Blood  Final Report (09-12-24 @ 22:01):    No growth at 5 days          RADIOLOGY & ADDITIONAL TESTS: Reviewed.   INTERVAL HPI/OVERNIGHT EVENTS: agitated overnight    SUBJECTIVE: Patient seen and examined at bedside this morning. He is comfortable, denies chest pain, SOB, N/V.     ROS: All negative except as listed above.     VITAL SIGNS:  ICU Vital Signs Last 24 Hrs  T(C): 36.3 (14 Sep 2024 18:00), Max: 36.6 (14 Sep 2024 12:00)  T(F): 97.3 (14 Sep 2024 18:00), Max: 97.9 (14 Sep 2024 12:00)  HR: 79 (15 Sep 2024 04:00) (75 - 80)  BP: 115/72 (15 Sep 2024 04:00) (83/50 - 140/61)  BP(mean): 87 (15 Sep 2024 04:00) (62 - 88)  ABP: --  ABP(mean): --  RR: 23 (15 Sep 2024 04:00) (10 - 23)  SpO2: 96% (15 Sep 2024 04:00) (91% - 98%)    O2 Parameters below as of 14 Sep 2024 18:00  Patient On (Oxygen Delivery Method): room air            Plateau pressure:   P/F ratio:     09-14 @ 07:01  -  09-15 @ 07:00  --------------------------------------------------------  IN: 926.4 mL / OUT: 1325 mL / NET: -398.6 mL      CAPILLARY BLOOD GLUCOSE          ECG: reviewed.    PHYSICAL EXAM:    GENERAL: NAD, lying in bed comfortably  HEAD:  Atraumatic, normocephalic  EYES: EOMI, PERRLA, conjunctiva and sclera clear  NECK: Supple, trachea midline, no JVD  HEART: Regular rate and rhythm, no murmurs, rubs, or gallops  LUNGS: Unlabored respirations.  Clear to auscultation bilaterally, no crackles, wheezing, or rhonchi  ABDOMEN: Soft, nontender, nondistended, +BS  EXTREMITIES: 2+ peripheral pulses bilaterally, cap refill<2 secs. No clubbing, cyanosis, or edema  NERVOUS SYSTEM:  A&Ox2, following commands, moving all extremities, no focal deficits   SKIN: No rashes or lesions    MEDICATIONS:  MEDICATIONS  (STANDING):  buMETAnide Injectable 2 milliGRAM(s) IV Push <User Schedule>  chlorhexidine 2% Cloths 1 Application(s) Topical daily  DOBUTamine Infusion 7.5 MICROgram(s)/kG/Min (8.6 mL/Hr) IV Continuous <Continuous>  melatonin 5 milliGRAM(s) Oral at bedtime  scopolamine 1 mG/72 Hr(s) Patch 1 Patch Transdermal every 72 hours  vasopressin Infusion 0.02 Unit(s)/Min (3 mL/Hr) IV Continuous <Continuous>    MEDICATIONS  (PRN):  HYDROmorphone  Injectable 0.2 milliGRAM(s) IV Push every 30 minutes PRN Moderate Pain (4 - 6)  LORazepam   Injectable 0.5 milliGRAM(s) IV Push every 4 hours PRN Anxiety      ALLERGIES:  Allergies    No Known Allergies    Intolerances    atorvastatin (Muscle Pain (Severe))      LABS:                        8.0    5.14  )-----------( 69       ( 14 Sep 2024 00:37 )             24.4     09-14    126<L>  |  86<L>  |  100<H>  ----------------------------<  163<H>  3.8   |  22  |  4.27<H>    Ca    8.8      14 Sep 2024 00:37  Phos  7.4     09-14  Mg     2.2     09-14    TPro  6.4  /  Alb  2.9<L>  /  TBili  2.4<H>  /  DBili  x   /  AST  61<H>  /  ALT  29  /  AlkPhos  211<H>  09-14    PT/INR - ( 14 Sep 2024 00:37 )   PT: 14.5 sec;   INR: 1.33 ratio         PTT - ( 14 Sep 2024 00:37 )  PTT:26.6 sec  Urinalysis Basic - ( 14 Sep 2024 00:37 )    Color: x / Appearance: x / SG: x / pH: x  Gluc: 163 mg/dL / Ketone: x  / Bili: x / Urobili: x   Blood: x / Protein: x / Nitrite: x   Leuk Esterase: x / RBC: x / WBC x   Sq Epi: x / Non Sq Epi: x / Bacteria: x      ABG:      vBG:    Micro:    Culture - Blood (collected 09-07-24 @ 16:30)  Source: .Blood Blood  Final Report (09-12-24 @ 22:01):    No growth at 5 days    Culture - Blood (collected 09-07-24 @ 16:15)  Source: .Blood Blood  Final Report (09-12-24 @ 22:01):    No growth at 5 days          RADIOLOGY & ADDITIONAL TESTS: Reviewed.

## 2024-09-15 NOTE — PROGRESS NOTE ADULT - ATTENDING COMMENTS
I have personally seen and examined the patient.  I fully participated in the care of this patient.  I have made amendments to the documentation where necessary, and agree with the history, physical exam, and plan as documented by the Resident.     Attending Addendum:    80 yo M with end stage cardiomyopathy on palliative Dobutamine at home  Admitted with worsening cardiogenic shock    Patient is now DNR with no escalation of care.  Remains on Dobutamine and max dose Vasopressin.    More alert today and seems comfortable with prn Dilaudid and Ativan.  Diet as tolerated as mental status allows.  Will continue diuretics in setting of hypervolemia to help with respiratory comfort.  Holding lab draws/Xrays/further interventions.      -Eden Devlin MD .

## 2024-09-16 NOTE — PROGRESS NOTE ADULT - STAFF/PROVIDER
Dr. Chavez (palliative) and Dr. Mohamud ([facility])
Dr. Chavez (palliative) and Dr. Mohamud ([facility])

## 2024-09-16 NOTE — PROGRESS NOTE ADULT - ASSESSMENT
79-year-old male with a history of HFrEF on home dobutamine 7.5 with last EF less than 20%, CAD, lungs mass - SCC, and urinary retention presented transferred from clinical hospital for management failure team after having syncopal episode last night, admitted for cardiogenic shock.    Neuro  - A&0 x2  - CT head post syncopal episode with no acute injury  - continue to monitor mental status per protocol  - Dc'ed PO medications ASA, plavix, bowel regimen as patient too lethargic to tolerate    Respiratory  #hx lung mass, SCC c/b fluid overload  - Required CPAP 10/5 50% weaned off to NC 2 L; now RA  - saturating well   - continue to monitor spO2    Cardiovascular  #HFrEF (last know EF%), acute decompensated heart failure  - on home dobutamine @ 7.5 mcg  - Vasopressin 0.1 MAP goal 65-70  - Bumex 2mg IV TID  - Unable to float swan however has a cordis for monitoring of venous sats  - borderline BPs without room for afterload reduction  - holding GDMT i/s/o shock  - continue strict I&O  - continue to trend lactate and perfusion indices  - Plan for no further escalation of care  - Lactate and creatinine worsening likely in setting of shock    #CAD  - on aspirin and plavix at home  - continue    Renal  BRODY on CKD  - sCr 4.09 uptrending, baseline usually around 2  - likely i/s/o cardiogenic shock  - strict I&O, trend renal function daily    GI  - diet as tolerated  - monitor for BM    Endo  - f/u a1c, trend glucose on cmp  - f/u tsh and lipid profile    Heme  - Hgb 7.7, likely 2/2 to volume overload, will ctm  - platelets low  but appears chronic  - continue to trend  DVT ppx: SCDs, given thrombocytopenia    ID  - hypothermic on admission  - f/u infectious w/u  - continue to trend wbc and fever curve  -Monitor off abx, afebrile and no leukocytosis    SKIN:  - new purpuric macules on back , with ecchymosis on lower back  - derm c/s, will CTM, conservative management per derm    #Ethics: DNR/ DNI    Lines: PIVs, RIJ Cordis from 9/8   79-year-old male with a history of HFrEF on home dobutamine 7.5 with last EF less than 20%, CAD, lungs mass - SCC, and urinary retention presented transferred from clinical hospital for management failure team after having syncopal episode last night, admitted for cardiogenic shock.    Neuro  - A&0 x0  - CT head post syncopal episode with no acute injury  - continue to monitor mental status per protocol  - Dc'ed PO medications ASA, plavix, bowel regimen as patient too lethargic to tolerate    Respiratory  #hx lung mass, SCC c/b fluid overload  - Required CPAP 10/5 50% weaned off to NC 2 L; now RA  - saturating well   - continue to monitor spO2    Cardiovascular  #HFrEF (last know EF%), acute decompensated heart failure  - on home dobutamine @ 7.5 mcg  - Vasopressin 0.06 MAP goal 65-70  - Bumex 2mg IV TID  - Unable to float swan however has a cordis for monitoring of venous sats  - borderline BPs without room for afterload reduction  - holding GDMT i/s/o shock  - continue strict I&O  - Plan for no further escalation of care  - Lactate and creatinine worsening likely in setting of shock    #CAD  - on aspirin and plavix at home  - continue    Renal  BRODY on CKD  - sCr 4.09 uptrending, baseline usually around 2  - likely i/s/o cardiogenic shock  - strict I&O, trend renal function daily    GI  - diet as tolerated  - monitor for BM    Endo  - f/u a1c, trend glucose on cmp  - f/u tsh and lipid profile    Heme  - Hgb 7.7, likely 2/2 to volume overload, will ctm  - platelets low  but appears chronic  - continue to trend  DVT ppx: SCDs, given thrombocytopenia    ID  - hypothermic on admission  - f/u infectious w/u  - continue to trend wbc and fever curve  -Monitor off abx, afebrile and no leukocytosis    SKIN:  - new purpuric macules on back , with ecchymosis on lower back  - derm c/s, will CTM, conservative management per derm    #Ethics: DNR/ DNI    Lines: PIVs, RIJ Cordis from 9/8

## 2024-09-16 NOTE — PROGRESS NOTE ADULT - CONVERSATION DETAILS
Teams introduced and roles in patient's care explained. Patient's wife/HCP present at bedside. Dr. Mohamud provided medical update and explained that patient's end stage heart failure continues to progress despite patient being on dobutamine at home, as patient presented to hospital in cardiogenic shock. He explained that unfortunately patient is not a candidate for advanced therapies and there are no further interventions, which can improve the function of his heart. Team expressed concern as patient has had several hospitalizations over the last 6 months and that it is expected for patient to continue to have frequent readmissions given his guarded prognosis.     Explore what matters most to patient, knowing his time is limited. Upon inquiry, patient unable to demonstrate insight into his advanced illness process. Patient's wife has good understanding of patient's condition and shared that while she understands patient's time is limited, she wants to give him every chance to improve. Validation and support provided. She did express that if it is patient's "time" then she wants him to pass peacefully. She also expressed wanting to get him home.    Educated family on the philosophy of hospice care. Discussed the services provided under hospice services emphasizing the goal of elevating patient's quality of life and optimizing symptom management and avoiding unnecessary future hospitalizations. Wife appreciative of information and agreeable to ongoing discussions about transitioning care pending clinical course.    Code status addressed as patient revoked DNR/DNI this admission. Patient unable to explained reasoning for revoking. Role of CPR and intubation in patient's care explained by team and recommendations for DNR/DNI made given end stage heart failure and concern for causing more harm than benefit with these interventions. Patient able to verbalize agreement with DNR/DNI and patient's wife/HCP also in agreement. New MOLST completed.    All questions answered and support provided.
Teams introduced and roles in patient's care explained. Patient's wife/HCP present at bedside. Dr. Mohamud provided medical update and explained that patient's end stage heart failure continues to progress despite patient being on dobutamine at home, as patient presented to hospital in cardiogenic shock. He explained that unfortunately patient is not a candidate for advanced therapies and there are no further interventions, which can improve the function of his heart. Team expressed concern as patient has had several hospitalizations over the last 6 months and that it is expected for patient to continue to have frequent readmissions given his guarded prognosis.     Explore what matters most to patient, knowing his time is limited. Upon inquiry, patient unable to demonstrate insight into his advanced illness process. Patient's wife has good understanding of patient's condition and shared that while she understands patient's time is limited, she wants to give him every chance to improve. Validation and support provided. She did express that if it is patient's "time" then she wants him to pass peacefully. She also expressed wanting to get him home.    Educated family on the philosophy of hospice care. Discussed the services provided under hospice services emphasizing the goal of elevating patient's quality of life and optimizing symptom management and avoiding unnecessary future hospitalizations. Wife appreciative of information and agreeable to ongoing discussions about transitioning care pending clinical course.    Code status addressed as patient revoked DNR/DNI this admission. Patient unable to explained reasoning for revoking. Role of CPR and intubation in patient's care explained by team and recommendations for DNR/DNI made given end stage heart failure and concern for causing more harm than benefit with these interventions. Patient able to verbalize agreement with DNR/DNI and patient's wife/HCP also in agreement. New MOLST completed.    All questions answered and support provided.

## 2024-09-16 NOTE — PROGRESS NOTE ADULT - SUBJECTIVE AND OBJECTIVE BOX
Indication for Geriatrics and Palliative Care Services/INTERVAL HPI: Complex decision making in setting of advanced illness  SUBJECTIVE AND OBJECTIVE: Pt seen and examined at bedside with wife present. Pt lethargic and unable to provide history. Pt shared that patient was more alert yesterday and able to spending meaningful time with her and close family.    OVERNIGHT EVENTS: Pt required PRN IV dilaudid 0.2mg x3 and IV ativan 0.5mg x2. Pt remains on fixed doses of IV dobutamine and IV vasopressin.    DNR on chart:DNI    Allergies  No Known Allergies    Intolerances  atorvastatin (Muscle Pain (Severe))    MEDICATIONS  (STANDING):  buMETAnide Injectable 2 milliGRAM(s) IV Push <User Schedule>  chlorhexidine 2% Cloths 1 Application(s) Topical daily  DOBUTamine Infusion 7.5 MICROgram(s)/kG/Min (8.6 mL/Hr) IV Continuous <Continuous>  melatonin 5 milliGRAM(s) Oral at bedtime  scopolamine 1 mG/72 Hr(s) Patch 1 Patch Transdermal every 72 hours  vasopressin Infusion 0.1 Unit(s)/Min (15 mL/Hr) IV Continuous <Continuous>    MEDICATIONS  (PRN):  HYDROmorphone  Injectable 0.2 milliGRAM(s) IV Push every 30 minutes PRN Moderate Pain (4 - 6)  LORazepam   Injectable 0.5 milliGRAM(s) IV Push every 4 hours PRN Anxiety      ITEMS UNCHECKED ARE NOT PRESENT    PRESENT SYMPTOMS: [ x]Unable to self-report - see [ ] CPOT [ ] PAINADS [ ] RDOS  Source if other than patient:  [ ]Family   [x ]Team     Pain:  [ ]yes [ ]no  QOL impact -   Location -                    Aggravating factors -  Quality -  Radiation -  Timing-  Severity (0-10 scale):  Minimal acceptable level (0-10 scale):     CPOT:    https://www.sccm.org/getattachment/zud98f03-5s2z-1q6u-4q2q-8226l7363k0e/Critical-Care-Pain-Observation-Tool-(CPOT)    Dyspnea:                           [ ]Mild [ ]Moderate [ ]Severe  Anxiety:                             [ ]Mild [ ]Moderate [ ]Severe  Fatigue:                             [ ]Mild [ ]Moderate [ ]Severe  Nausea:                             [ ]Mild [ ]Moderate [ ]Severe  Loss of appetite:              [ ]Mild [ ]Moderate [ ]Severe  Constipation:                    [ ]Mild [ ]Moderate [ ]Severe    PCSSQ[Palliative Care Spiritual Screening Question]   Severity (0-10):  Score of 4 or > indicate consideration of Chaplaincy referral.  Chaplaincy Referral: [ ] yes [ ] refused [x ] following [ ] Deferred     Caregiver [place]? : [x ] yes [ ] no [ ] Deferred [ ] Declined             Social work referral [ x] Patient & Family Centered Care Referral [ ]     Anticipatory Grief present?:  [x ] yes [ ] no  [ ] Deferred                  Social work referral [ x] Chaplaincy Referral[ ]      Other Symptoms:  [ ]All other review of systems negative   [x ]Unable to obtain due to poor mentation    Palliative Performance Status Version 2:      30   %      http://npcrc.org/files/news/palliative_performance_scale_ppsv2.pdf  PHYSICAL EXAM:  Vital Signs Last 24 Hrs  T(C): 36.8 (13 Sep 2024 12:00), Max: 36.8 (13 Sep 2024 12:00)  T(F): 98.2 (13 Sep 2024 12:00), Max: 98.2 (13 Sep 2024 12:00)  HR: 80 (13 Sep 2024 13:30) (77 - 99)  BP: 94/54 (13 Sep 2024 13:30) (67/35 - 109/48)  BP(mean): 68 (13 Sep 2024 13:30) (45 - 82)  RR: 26 (13 Sep 2024 13:30) (12 - 97)  SpO2: 98% (13 Sep 2024 13:30) (87% - 100%)    Parameters below as of 13 Sep 2024 13:00  Patient On (Oxygen Delivery Method): room air     I&O's Summary    12 Sep 2024 07:01  -  13 Sep 2024 07:00  --------------------------------------------------------  IN: 883.1 mL / OUT: 2820 mL / NET: -1936.9 mL    13 Sep 2024 07:01  -  13 Sep 2024 13:58  --------------------------------------------------------  IN: 231.6 mL / OUT: 275 mL / NET: -43.4 mL       GENERAL:  [ ]Alert  [ ]Oriented x 1  [x ]Lethargic  [ ]Cachexia  [ ]Unarousable  [ ]Verbal  [ ]Non-Verbal  Behavioral:   [ ]Anxiety  [ ]Delirium [ ]Agitation [ ]Other  HEENT:  [x]Normal   [ ]Dry mouth   [ ]ET Tube/Trach  [ ]Oral lesions  PULMONARY:   [x]Clear [ ]Tachypnea  [ ]Audible excessive secretions   [ ]Rhonchi        [ ]Right [ ]Left [ ]Bilateral  [ ]Crackles        [ ]Right [ ]Left [ ]Bilateral  [ ]Wheezing     [ ]Right [ ]Left [ ]Bilateral  [ ]Diminished BS [ ] Right [ ]Left [ ]Bilateral  CARDIOVASCULAR:    [x]Regular [ ]Irregular [ ]Tachy  [ ]Christiano [ ]Murmur [ ]Other  GASTROINTESTINAL:  [x]Soft  [ ]Distended   [x]+BS  [x]Non tender [ ]Tender  [ ]PEG [ ]OGT/ NGT   Last BM:    GENITOURINARY:  [ ]Normal [ ]Incontinent   [ ]Oliguria/Anuria   [x ]Chandler  MUSCULOSKELETAL:   [ ]Normal   [x]Weakness  [ ]Bed/Wheelchair bound [ ]Edema  NEUROLOGIC:   [ ]No focal deficits  [x ] Cognitive impairment  [ ] Dysphagia [ ]Dysarthria [ ] Paresis [ ]Other   SKIN:   [x]Normal  [ ]Rash   [ ]Pressure ulcer(s) [ ]y [ ]n present on admission    CRITICAL CARE:  [x ] Shock Present  [ ]Septic [x ]Cardiogenic [ ]Neurologic [ ]Hypovolemic  [x ]  Vasopressors [x ]  Inotropes   [ ]Respiratory failure present [ ]Mechanical ventilation [ ]Non-invasive ventilatory support [ ]High flow    [ ]Acute  [ ]Chronic [ ]Hypoxic  [ ]Hypercarbic [ ]Other  [ ]Other organ failure     LABS: No new labs.    RADIOLOGY & ADDITIONAL STUDIES: Prior imaging reviewed.      Protein Calorie Malnutrition Present: [ ]mild [ ]moderate [ ]severe [ ]underweight [ ]morbid obesity  https://www.andeal.org/vault/6365/web/files/ONC/Table_Clinical%20Characteristics%20to%20Document%20Malnutrition-White%20JV%20et%20al%694872.pdf    Height (cm): 182.9 (09-07-24 @ 22:22), 182.9 (09-07-24 @ 10:27), 182.9 (08-05-24 @ 09:41)  Weight (kg): 76.4 (09-07-24 @ 22:22), 74.8 (09-07-24 @ 10:27), 70.3 (08-05-24 @ 09:41)  BMI (kg/m2): 22.8 (09-07-24 @ 22:22), 22.4 (09-07-24 @ 10:27), 21 (08-05-24 @ 09:41)    [ ]PPSV2 < or = 30%  [ ]significant weight loss [ ]poor nutritional intake [ ]anasarca[ ]Artificial Nutrition    Other REFERRALS:  [ ]Hospice  [ ]Child Life  [ ]Social Work  [ ]Case management [ ]Holistic Therapy     Goals of Care Document:

## 2024-09-16 NOTE — PROGRESS NOTE ADULT - SUBJECTIVE AND OBJECTIVE BOX
INTERVAL HPI/OVERNIGHT EVENTS:    SUBJECTIVE: Patient seen and examined at bedside.     ROS: All negative except as listed above.    VITAL SIGNS:  ICU Vital Signs Last 24 Hrs  T(C): 37.3 (16 Sep 2024 04:00), Max: 37.3 (16 Sep 2024 04:00)  T(F): 99.1 (16 Sep 2024 04:00), Max: 99.1 (16 Sep 2024 04:00)  HR: 68 (16 Sep 2024 04:00) (68 - 79)  BP: 89/53 (16 Sep 2024 04:00) (87/52 - 138/57)  BP(mean): 65 (16 Sep 2024 04:00) (65 - 82)  ABP: --  ABP(mean): --  RR: 20 (16 Sep 2024 04:00) (12 - 30)  SpO2: 100% (16 Sep 2024 04:00) (96% - 100%)    O2 Parameters below as of 16 Sep 2024 04:00  Patient On (Oxygen Delivery Method): nasal cannula  O2 Flow (L/min): 2          Plateau pressure:   P/F ratio:     09-15 @ 07:01  -  09-16 @ 07:00  --------------------------------------------------------  IN: 700.6 mL / OUT: 502 mL / NET: 198.6 mL      CAPILLARY BLOOD GLUCOSE          ECG: reviewed.    PHYSICAL EXAM:    GENERAL: NAD, lying in bed comfortably  HEAD:  Atraumatic, normocephalic  EYES: EOMI, PERRLA, conjunctiva and sclera clear  NECK: Supple, trachea midline, no JVD  HEART: Regular rate and rhythm, no murmurs, rubs, or gallops  LUNGS: Unlabored respirations.  Clear to auscultation bilaterally, no crackles, wheezing, or rhonchi  ABDOMEN: Soft, nontender, nondistended, +BS  EXTREMITIES: 2+ peripheral pulses bilaterally, cap refill<2 secs. No clubbing, cyanosis, or edema  NERVOUS SYSTEM:  A&Ox3, following commands, moving all extremities, no focal deficits   SKIN: No rashes or lesions    MEDICATIONS:  MEDICATIONS  (STANDING):  buMETAnide Injectable 2 milliGRAM(s) IV Push <User Schedule>  chlorhexidine 2% Cloths 1 Application(s) Topical daily  DOBUTamine Infusion 7.5 MICROgram(s)/kG/Min (8.6 mL/Hr) IV Continuous <Continuous>  melatonin 5 milliGRAM(s) Oral at bedtime  scopolamine 1 mG/72 Hr(s) Patch 1 Patch Transdermal every 72 hours  vasopressin Infusion 0.1 Unit(s)/Min (15 mL/Hr) IV Continuous <Continuous>    MEDICATIONS  (PRN):  HYDROmorphone  Injectable 0.2 milliGRAM(s) IV Push every 30 minutes PRN Moderate Pain (4 - 6)  LORazepam   Injectable 0.5 milliGRAM(s) IV Push every 4 hours PRN Anxiety      ALLERGIES:  Allergies    No Known Allergies    Intolerances    atorvastatin (Muscle Pain (Severe))      LABS:              ABG:      vBG:    Micro:    Culture - Blood (collected 09-07-24 @ 16:30)  Source: .Blood Blood  Final Report (09-12-24 @ 22:01):    No growth at 5 days    Culture - Blood (collected 09-07-24 @ 16:15)  Source: .Blood Blood  Final Report (09-12-24 @ 22:01):    No growth at 5 days          RADIOLOGY & ADDITIONAL TESTS: Reviewed.   INTERVAL HPI/OVERNIGHT EVENTS:    SUBJECTIVE: Patient seen and examined at bedside. He is not responding to verbal commands. Aox0, moaning in pain.     ROS: All negative except as listed above.    VITAL SIGNS:  ICU Vital Signs Last 24 Hrs  T(C): 37.3 (16 Sep 2024 04:00), Max: 37.3 (16 Sep 2024 04:00)  T(F): 99.1 (16 Sep 2024 04:00), Max: 99.1 (16 Sep 2024 04:00)  HR: 68 (16 Sep 2024 04:00) (68 - 79)  BP: 89/53 (16 Sep 2024 04:00) (87/52 - 138/57)  BP(mean): 65 (16 Sep 2024 04:00) (65 - 82)  ABP: --  ABP(mean): --  RR: 20 (16 Sep 2024 04:00) (12 - 30)  SpO2: 100% (16 Sep 2024 04:00) (96% - 100%)    O2 Parameters below as of 16 Sep 2024 04:00  Patient On (Oxygen Delivery Method): nasal cannula  O2 Flow (L/min): 2          Plateau pressure:   P/F ratio:     09-15 @ 07:01  -  09-16 @ 07:00  --------------------------------------------------------  IN: 700.6 mL / OUT: 502 mL / NET: 198.6 mL      CAPILLARY BLOOD GLUCOSE          ECG: reviewed.    PHYSICAL EXAM:    GENERAL: NAD, lying in bed comfortably  HEAD:  Atraumatic, normocephalic  EYES: EOMI, PERRLA, dilated, conjunctiva and sclera clear  NECK: Supple, trachea midline, no JVD  HEART: Regular rate and rhythm, no murmurs, rubs, or gallops  LUNGS: Unlabored respirations.  Clear to auscultation bilaterally, no crackles, wheezing, or rhonchi  ABDOMEN: Soft, nontender, nondistended, +BS  EXTREMITIES: 2+ peripheral pulses bilaterally, cap refill<2 secs. No clubbing, cyanosis, or edema  NERVOUS SYSTEM:  A&Ox0,   SKIN: No rashes or lesions    MEDICATIONS:  MEDICATIONS  (STANDING):  buMETAnide Injectable 2 milliGRAM(s) IV Push <User Schedule>  chlorhexidine 2% Cloths 1 Application(s) Topical daily  DOBUTamine Infusion 7.5 MICROgram(s)/kG/Min (8.6 mL/Hr) IV Continuous <Continuous>  melatonin 5 milliGRAM(s) Oral at bedtime  scopolamine 1 mG/72 Hr(s) Patch 1 Patch Transdermal every 72 hours  vasopressin Infusion 0.1 Unit(s)/Min (15 mL/Hr) IV Continuous <Continuous>    MEDICATIONS  (PRN):  HYDROmorphone  Injectable 0.2 milliGRAM(s) IV Push every 30 minutes PRN Moderate Pain (4 - 6)  LORazepam   Injectable 0.5 milliGRAM(s) IV Push every 4 hours PRN Anxiety      ALLERGIES:  Allergies    No Known Allergies    Intolerances    atorvastatin (Muscle Pain (Severe))      LABS:            ABG:      vBG:    Micro:    Culture - Blood (collected 09-07-24 @ 16:30)  Source: .Blood Blood  Final Report (09-12-24 @ 22:01):    No growth at 5 days    Culture - Blood (collected 09-07-24 @ 16:15)  Source: .Blood Blood  Final Report (09-12-24 @ 22:01):    No growth at 5 days          RADIOLOGY & ADDITIONAL TESTS: Reviewed.

## 2024-09-16 NOTE — PROGRESS NOTE ADULT - ATTENDING COMMENTS
78yo M with HFrEF on home dobutamine and squamous cell carcinoma of lung here with cardiogenic shock. Transitioning to comfort. Waiting list for PCU     Neuro intermittently alert but frequently obtunded   Pulm O2 for comfort   CV vaso less,  continues, ongoing discussion with family about weaning   Renal intermittent bumex   GI feeds as tolerated   ID no abx   Heme off AC   Endo no longer checking BG control   Lines RIJ to remain in for med administration

## 2024-09-16 NOTE — PROGRESS NOTE ADULT - SUBJECTIVE AND OBJECTIVE BOX
PATIENT:  ELYSE MALAVE  [id number]    CHIEF COMPLAINT:  Patient is a 79y old  Male who presents with a chief complaint of cardiogenic shock (16 Sep 2024 19:55)      INTERVAL HISTORYOVERNIGHT EVENTS:      REVIEW OF SYSTEMS:    Constitutional:     [ ] negative [ ] fevers [ ] chills [ ] weight loss [ ] weight gain  HEENT:                  [ ] negative [ ] dry eyes [ ] eye irritation [ ] postnasal drip [ ] nasal congestion  CV:                         [ ] negative  [ ] chest pain [ ] orthopnea [ ] palpitations [ ] murmur  Resp:                     [ ] negative [ ] cough [ ] shortness of breath [ ] dyspnea [ ] wheezing [ ] sputum [ ] hemoptysis  GI:                          [ ] negative [ ] nausea [ ] vomiting [ ] diarrhea [ ] constipation [ ] abd pain [ ] dysphagia   :                        [ ] negative [ ] dysuria [ ] nocturia [ ] hematuria [ ] increased urinary frequency  Musculoskeletal: [ ] negative [ ] back pain [ ] myalgias [ ] arthralgias [ ] fracture  Skin:                       [ ] negative [ ] rash [ ] itch  Neurological:        [ ] negative [ ] headache [ ] dizziness [ ] syncope [ ] weakness [ ] numbness  Psychiatric:           [ ] negative [ ] anxiety [ ] depression  Endocrine:            [ ] negative [ ] diabetes [ ] thyroid problem  Heme/Lymph:      [ ] negative [ ] anemia [ ] bleeding problem  Allergic/Immune: [ ] negative [ ] itchy eyes [ ] nasal discharge [ ] hives [ ] angioedema    [ ] All other systems negative  [ ] Unable to assess ROS because ________.    MEDICATIONS:  MEDICATIONS  (STANDING):  buMETAnide Injectable 2 milliGRAM(s) IV Push <User Schedule>  chlorhexidine 2% Cloths 1 Application(s) Topical daily  DOBUTamine Infusion 7.5 MICROgram(s)/kG/Min (8.6 mL/Hr) IV Continuous <Continuous>  melatonin 5 milliGRAM(s) Oral at bedtime  scopolamine 1 mG/72 Hr(s) Patch 1 Patch Transdermal every 72 hours  vasopressin Infusion 0.1 Unit(s)/Min (15 mL/Hr) IV Continuous <Continuous>    MEDICATIONS  (PRN):  HYDROmorphone  Injectable 0.2 milliGRAM(s) IV Push every 30 minutes PRN Moderate Pain (4 - 6)  LORazepam   Injectable 0.5 milliGRAM(s) IV Push every 4 hours PRN Anxiety      ALLERGIES:  Allergies    No Known Allergies    Intolerances    atorvastatin (Muscle Pain (Severe))      OBJECTIVE:  ICU Vital Signs Last 24 Hrs  T(C): 36 (16 Sep 2024 20:00), Max: 37.3 (16 Sep 2024 04:00)  T(F): 96.8 (16 Sep 2024 20:00), Max: 99.1 (16 Sep 2024 04:00)  HR: 85 (16 Sep 2024 16:00) (68 - 85)  BP: 95/60 (16 Sep 2024 16:00) (87/52 - 105/58)  BP(mean): 73 (16 Sep 2024 16:00) (65 - 79)  ABP: --  ABP(mean): --  RR: 35 (16 Sep 2024 16:00) (12 - 35)  SpO2: 98% (16 Sep 2024 16:00) (94% - 100%)    O2 Parameters below as of 16 Sep 2024 20:00  Patient On (Oxygen Delivery Method): nasal cannula  O2 Flow (L/min): 2          Adult Advanced Hemodynamics Last 24 Hrs  CVP(mm Hg): --  CVP(cm H2O): --  CO: --  CI: --  PA: --  PA(mean): --  PCWP: --  SVR: --  SVRI: --  PVR: --  PVRI: --  CAPILLARY BLOOD GLUCOSE        CAPILLARY BLOOD GLUCOSE        I&O's Summary    15 Sep 2024 07:01  -  16 Sep 2024 07:00  --------------------------------------------------------  IN: 700.6 mL / OUT: 502 mL / NET: 198.6 mL    16 Sep 2024 07:01  -  16 Sep 2024 20:21  --------------------------------------------------------  IN: 441.2 mL / OUT: 0 mL / NET: 441.2 mL      Daily     Daily     PHYSICAL EXAMINATION:  General: WN/WD NAD  HEENT: PERRLA, EOMI, moist mucous membranes  Neurology: A&Ox3, nonfocal, GARCIA x 4  Respiratory: CTA B/L, normal respiratory effort, no wheezes, crackles, rales  CV: RRR, S1S2, no murmurs, rubs or gallops  Abdominal: Soft, NT, ND +BS, Last BM  Extremities: No edema, + peripheral pulses  Incisions:   Tubes:    LABS:                          TELEMETRY:     EKG:     IMAGING:       PATIENT:  ELYSE MALAVE  [id number]    CHIEF COMPLAINT:  Patient is a 79y old male who presents with a chief complaint of cardiogenic shock (16 Sep 2024 19:55)    INTERVAL HISTORY:  - Awaiting PCU bed    MEDICATIONS:  MEDICATIONS  (STANDING):  buMETAnide Injectable 2 milliGRAM(s) IV Push <User Schedule>  chlorhexidine 2% Cloths 1 Application(s) Topical daily  DOBUTamine Infusion 7.5 MICROgram(s)/kG/Min (8.6 mL/Hr) IV Continuous <Continuous>  melatonin 5 milliGRAM(s) Oral at bedtime  scopolamine 1 mG/72 Hr(s) Patch 1 Patch Transdermal every 72 hours  vasopressin Infusion 0.1 Unit(s)/Min (15 mL/Hr) IV Continuous <Continuous>    MEDICATIONS  (PRN):  HYDROmorphone  Injectable 0.2 milliGRAM(s) IV Push every 30 minutes PRN Moderate Pain (4 - 6)  LORazepam   Injectable 0.5 milliGRAM(s) IV Push every 4 hours PRN Anxiety      ALLERGIES:  No Known Allergies    Intolerances  atorvastatin (Muscle Pain (Severe))    OBJECTIVE:  ICU Vital Signs Last 24 Hrs  T(C): 36 (16 Sep 2024 20:00), Max: 37.3 (16 Sep 2024 04:00)  T(F): 96.8 (16 Sep 2024 20:00), Max: 99.1 (16 Sep 2024 04:00)  HR: 85 (16 Sep 2024 16:00) (68 - 85)  BP: 95/60 (16 Sep 2024 16:00) (87/52 - 105/58)  BP(mean): 73 (16 Sep 2024 16:00) (65 - 79)  ABP: --  ABP(mean): --  RR: 35 (16 Sep 2024 16:00) (12 - 35)  SpO2: 98% (16 Sep 2024 16:00) (94% - 100%)    O2 Parameters below as of 16 Sep 2024 20:00  Patient On (Oxygen Delivery Method): nasal cannula  O2 Flow (L/min): 2    I&O's Summary    15 Sep 2024 07:01  -  16 Sep 2024 07:00  --------------------------------------------------------  IN: 700.6 mL / OUT: 502 mL / NET: 198.6 mL    16 Sep 2024 07:01  -  16 Sep 2024 20:21  --------------------------------------------------------  IN: 441.2 mL / OUT: 0 mL / NET: 441.2 mL

## 2024-09-16 NOTE — PROGRESS NOTE ADULT - ASSESSMENT
78 y/o male with a history of HFrEF on home dobutamine 7.5 with last EF less than 20%, CAD, lungs mass - SCC, and urinary retention presented transferred from clinical hospital for management failure team after having syncopal episode last night, admitted for cardiogenic shock.    Neuro  - A&0 x0  - CT head post syncopal episode with no acute injury  - continue to monitor mental status per protocol  - Dc'ed PO medications ASA, plavix, bowel regimen as patient too lethargic to tolerate    Respiratory  #hx lung mass, SCC c/b fluid overload  - Required CPAP 10/5 50% weaned off to NC 2 L; now RA  - saturating well   - continue to monitor spO2    Cardiovascular  #HFrEF (last know EF%), acute decompensated heart failure  - on home dobutamine @ 7.5 mcg  - Vasopressin 0.06 MAP goal 65-70  - Bumex 2mg IV TID  - holding GDMT i/s/o shock  - continue strict I&O  - Plan for no further escalation of care  - Lactate and creatinine worsening likely in setting of shock    #CAD  - on aspirin and plavix at home  - holding due to inability to swallow pills safely    Renal  BRODY on CKD  - sCr 4.09 uptrending, baseline usually around 2  - likely i/s/o cardiogenic shock  - strict I&O, trend renal function daily    GI  - diet as tolerated  - monitor for BM    Endo  - No active issues    Heme  - No longer checking labs  - platelets low  but appears chronic  - continue to trend  DVT ppx: SCDs, given thrombocytopenia    ID  - hypothermic on admission  - continue to trend wbc and fever curve  - Monitor off abx, afebrile and no leukocytosis    SKIN:  - new purpuric macules on back , with ecchymosis on lower back  - derm c/s, will CTM, conservative management per derm    Ethics: DNR/ DNI, awaiting PCU bed    Patient requires continuous monitoring with bedside rhythm monitoring, pulse ox monitoring, and intermittent blood gas analysis. Care plan discussed with ICU care team. Patient remained critical and at risk for life threatening decompensation.  Patient seen, examined and plan discussed with CCU team during rounds.     I have personally provided 35 minutes of critical care time excluding time spent on separate procedures, in addition to initial critical care time provided by the CICU Attending, Dr. Mhoamud.    Angle Mccoy, [facility]  78 y/o male with a history of HFrEF on home dobutamine 7.5 with last EF less than 20%, CAD, lungs mass - SCC, and urinary retention presented transferred from clinical hospital for management failure team after having syncopal episode last night, admitted for cardiogenic shock.    Neuro  - A&0 x0  - CT head post syncopal episode with no acute injury  - continue to monitor mental status per protocol  - Dc'ed PO medications ASA, plavix, bowel regimen as patient too lethargic to tolerate    Respiratory  #hx lung mass, SCC c/b fluid overload  - Required CPAP 10/5 50% weaned off to NC 2 L; now RA  - saturating well   - continue to monitor spO2    Cardiovascular  #HFrEF (last know EF%), acute decompensated heart failure  - on home dobutamine @ 7.5 mcg  - Vasopressin 0.06 MAP goal 65-70  - Bumex 2mg IV TID  - holding GDMT i/s/o shock  - continue strict I&O  - Plan for no further escalation of care  - Lactate and creatinine worsening likely in setting of shock    #CAD  - on aspirin and plavix at home  - holding due to inability to swallow pills safely    Renal  BRODY on CKD  - sCr 4.09 uptrending, baseline usually around 2  - likely i/s/o cardiogenic shock  - strict I&O, trend renal function daily    GI  - diet as tolerated  - monitor for BM    Endo  - No active issues    Heme  - No longer checking labs  - platelets low  but appears chronic  - continue to trend  DVT ppx: SCDs, given thrombocytopenia    ID  - hypothermic on admission  - continue to trend wbc and fever curve  - Monitor off abx, afebrile and no leukocytosis    SKIN:  - new purpuric macules on back , with ecchymosis on lower back  - derm c/s, will CTM, conservative management per derm    Ethics: DNR/ DNI, awaiting PCU bed    Patient requires continuous monitoring with bedside rhythm monitoring, pulse ox monitoring, and intermittent blood gas analysis. Care plan discussed with ICU care team. Patient remained critical and at risk for life threatening decompensation.  Patient seen, examined and plan discussed with CCU team during rounds.     I have personally provided 35 minutes of critical care time excluding time spent on separate procedures, in addition to initial critical care time provided by the CICU Attending, Dr. Castillo.    Angle Mccoy, [facility] [place]

## 2024-09-16 NOTE — PROGRESS NOTE ADULT - PROBLEM SELECTOR PLAN 1
Continue IV dilaudid 0.2mg q1h PRN severe pain  Wife expressed concern about medications sedating patient and wish for him to remain alert as long as possible. Validation provided and also provided educations to wife about risk/benefits of medications with goals being prioritizing symptoms and alleviating suffering. She is understanding and agreeable to continue symptom directed management as these are patient's wishes.

## 2024-09-16 NOTE — PROGRESS NOTE ADULT - SUBJECTIVE AND OBJECTIVE BOX
Seen in CCU, on NC O2, lethargic, confused, events noted    Vital Signs Last 24 Hrs  T(C): 37.1 (09-16-24 @ 08:00), Max: 37.3 (09-16-24 @ 04:00)  T(F): 98.8 (09-16-24 @ 08:00), Max: 99.1 (09-16-24 @ 04:00)  HR: 85 (09-16-24 @ 16:00) (68 - 85)  BP: 95/60 (09-16-24 @ 16:00) (87/52 - 106/58)  BP(mean): 73 (09-16-24 @ 16:00) (65 - 79)  RR: 35 (09-16-24 @ 16:00) (12 - 35)  SpO2: 98% (09-16-24 @ 16:00) (94% - 100%)    I&O's Detail    15 Sep 2024 07:01  -  16 Sep 2024 07:00  --------------------------------------------------------  IN:    DOBUTamine: 180.6 mL    Oral Fluid: 240 mL    Vasopressin: 60 mL    Vasopressin: 220 mL  Total IN: 700.6 mL    OUT:    Incontinent per Condom Catheter (mL): 327 mL    Indwelling Catheter - Urethral (mL): 175 mL  Total OUT: 502 mL    16 Sep 2024 07:01  -  16 Sep 2024 19:56  --------------------------------------------------------  IN:    DOBUTamine: 103.2 mL    Oral Fluid: 230 mL    Vasopressin: 108 mL  Total IN: 441.2 mL    OUT:  Total OUT: 0 mL    Respiratory b/l air entry  Cardiovascular S1 S2  Gastrointestinal soft, ND  Extremities tr edema                                   buMETAnide Injectable 2 milliGRAM(s) IV Push <User Schedule>  chlorhexidine 2% Cloths 1 Application(s) Topical daily  DOBUTamine Infusion 7.5 MICROgram(s)/kG/Min IV Continuous <Continuous>  HYDROmorphone  Injectable 0.2 milliGRAM(s) IV Push every 30 minutes PRN  LORazepam   Injectable 0.5 milliGRAM(s) IV Push every 4 hours PRN  melatonin 5 milliGRAM(s) Oral at bedtime  scopolamine 1 mG/72 Hr(s) Patch 1 Patch Transdermal every 72 hours  vasopressin Infusion 0.1 Unit(s)/Min IV Continuous <Continuous>    Assessment and Recommendation:     CM, EF ~ 20 - 25%, mod MR, lung ca  Cardio-renal/hypotensive BRODY/CKD 3/4 (baseline Cr ~ 2.)   Shock, worsening MS   Prognosis is poor overall  Palliative f/u appr, comfort directed care   No further blood work, no HD  DNR/I  Awaits tx to PCU  Will sign off     [phone]

## 2024-09-16 NOTE — PROGRESS NOTE ADULT - PROBLEM SELECTOR PLAN 4
Goals are for comfort directed care.  Continue fixed doses of pressors and inotrope. No further lab draws.  Surrogate is wife Kenia.  DNR/DNI, LEONIE in chart.  Pt to be transferred to PCU for symptom management and end of life care when bed available.

## 2024-09-16 NOTE — PROGRESS NOTE ADULT - PROBLEM SELECTOR PLAN 3
admitted in cardiogenic shock  goals are now for comfort directed care  IV dobutamine and IV vasopressin to be continued at fixed rate. No further escalation of care.

## 2024-09-16 NOTE — PROGRESS NOTE ADULT - PROBLEM SELECTOR PLAN 5
For symptoms recommend:  Dilaudid 0.2 mg IV q1h prn dyspnea or tachypnea   Dilaudid 0.2 mg IV q1h prn severe pain   Ativan 0.5 mg IV q2h prn agitation   Glycopyrrolate 0.4 mg IV q6h prn copious oral secretions   Dulcolax suppository daily prn constipation   Acetaminophen suppository 650 mg q6h prn fever and mild/moderate pain  Zofran 4 mg IV q8h prn nausea or vomiting    Recommend discontinuation of all medications and interventions that do not serve goal of promoting patient's comfort and dignity at end-of-life.    Chaplaincy and palliative SW following for additional support.  Please page for any acute symptoms or further questions or concerns.

## 2024-09-17 NOTE — PROGRESS NOTE ADULT - SUBJECTIVE AND OBJECTIVE BOX
INTERVAL HPI/OVERNIGHT EVENTS:    SUBJECTIVE: Patient seen and examined at bedside.     ROS: All negative except as listed above.    VITAL SIGNS:  ICU Vital Signs Last 24 Hrs  T(C): 36 (17 Sep 2024 04:00), Max: 37.1 (16 Sep 2024 08:00)  T(F): 96.8 (17 Sep 2024 04:00), Max: 98.8 (16 Sep 2024 08:00)  HR: 74 (17 Sep 2024 04:00) (68 - 85)  BP: 98/55 (17 Sep 2024 04:00) (78/52 - 102/55)  BP(mean): 72 (17 Sep 2024 04:00) (61 - 74)  ABP: --  ABP(mean): --  RR: 25 (17 Sep 2024 04:00) (14 - 35)  SpO2: 97% (17 Sep 2024 04:00) (94% - 100%)    O2 Parameters below as of 17 Sep 2024 04:00  Patient On (Oxygen Delivery Method): nasal cannula  O2 Flow (L/min): 2          Plateau pressure:   P/F ratio:     09-16 @ 07:01  -  09-17 @ 07:00  --------------------------------------------------------  IN: 712.4 mL / OUT: 800 mL / NET: -87.6 mL      CAPILLARY BLOOD GLUCOSE          ECG: reviewed.    PHYSICAL EXAM:    GENERAL: NAD, lying in bed comfortably  HEAD:  Atraumatic, normocephalic  EYES: EOMI, PERRLA, conjunctiva and sclera clear  NECK: Supple, trachea midline, no JVD  HEART: Regular rate and rhythm, no murmurs, rubs, or gallops  LUNGS: Unlabored respirations.  Clear to auscultation bilaterally, no crackles, wheezing, or rhonchi  ABDOMEN: Soft, nontender, nondistended, +BS  EXTREMITIES: 2+ peripheral pulses bilaterally, cap refill<2 secs. No clubbing, cyanosis, or edema  NERVOUS SYSTEM:  A&Ox3, following commands, moving all extremities, no focal deficits   SKIN: No rashes or lesions    MEDICATIONS:  MEDICATIONS  (STANDING):  buMETAnide Injectable 2 milliGRAM(s) IV Push <User Schedule>  chlorhexidine 2% Cloths 1 Application(s) Topical daily  DOBUTamine Infusion 7.5 MICROgram(s)/kG/Min (8.6 mL/Hr) IV Continuous <Continuous>  melatonin 5 milliGRAM(s) Oral at bedtime  scopolamine 1 mG/72 Hr(s) Patch 1 Patch Transdermal every 72 hours  vasopressin Infusion 0.1 Unit(s)/Min (15 mL/Hr) IV Continuous <Continuous>    MEDICATIONS  (PRN):  HYDROmorphone  Injectable 0.2 milliGRAM(s) IV Push every 30 minutes PRN Moderate Pain (4 - 6)  LORazepam   Injectable 0.5 milliGRAM(s) IV Push every 4 hours PRN Anxiety      ALLERGIES:  Allergies    No Known Allergies    Intolerances    atorvastatin (Muscle Pain (Severe))      LABS:              ABG:      vBG:    Micro:    Culture - Blood (collected 09-07-24 @ 16:30)  Source: .Blood Blood  Final Report (09-12-24 @ 22:01):    No growth at 5 days    Culture - Blood (collected 09-07-24 @ 16:15)  Source: .Blood Blood  Final Report (09-12-24 @ 22:01):    No growth at 5 days          RADIOLOGY & ADDITIONAL TESTS: Reviewed.   INTERVAL HPI/OVERNIGHT EVENTS:    SUBJECTIVE: Patient seen and examined at bedside. He is resting comfortably in bed. No chest pain, SOB, N/V.    ROS: All negative except as listed above.    VITAL SIGNS:  ICU Vital Signs Last 24 Hrs  T(C): 36 (17 Sep 2024 04:00), Max: 37.1 (16 Sep 2024 08:00)  T(F): 96.8 (17 Sep 2024 04:00), Max: 98.8 (16 Sep 2024 08:00)  HR: 74 (17 Sep 2024 04:00) (68 - 85)  BP: 98/55 (17 Sep 2024 04:00) (78/52 - 102/55)  BP(mean): 72 (17 Sep 2024 04:00) (61 - 74)  ABP: --  ABP(mean): --  RR: 25 (17 Sep 2024 04:00) (14 - 35)  SpO2: 97% (17 Sep 2024 04:00) (94% - 100%)    O2 Parameters below as of 17 Sep 2024 04:00  Patient On (Oxygen Delivery Method): nasal cannula  O2 Flow (L/min): 2          Plateau pressure:   P/F ratio:     09-16 @ 07:01  -  09-17 @ 07:00  --------------------------------------------------------  IN: 712.4 mL / OUT: 800 mL / NET: -87.6 mL      CAPILLARY BLOOD GLUCOSE          ECG: reviewed.    PHYSICAL EXAM:    GENERAL: NAD, lying in bed comfortably  HEAD:  Atraumatic, normocephalic  EYES: EOMI, PERRLA, dilates, conjunctiva and sclera clear  NECK: Supple, trachea midline, no JVD  HEART: Regular rate and rhythm, no murmurs, rubs, or gallops  LUNGS: Unlabored respirations.  Clear to auscultation bilaterally, no crackles, wheezing, or rhonchi  ABDOMEN: Soft, nontender, nondistended, +BS  EXTREMITIES: 2+ peripheral pulses bilaterally, cap refill<2 secs. No clubbing, cyanosis, or edema  NERVOUS SYSTEM:  A&Ox3, following commands, moving all extremities, no focal deficits   SKIN: No rashes or lesions    MEDICATIONS:  MEDICATIONS  (STANDING):  buMETAnide Injectable 2 milliGRAM(s) IV Push <User Schedule>  chlorhexidine 2% Cloths 1 Application(s) Topical daily  DOBUTamine Infusion 7.5 MICROgram(s)/kG/Min (8.6 mL/Hr) IV Continuous <Continuous>  melatonin 5 milliGRAM(s) Oral at bedtime  scopolamine 1 mG/72 Hr(s) Patch 1 Patch Transdermal every 72 hours  vasopressin Infusion 0.1 Unit(s)/Min (15 mL/Hr) IV Continuous <Continuous>    MEDICATIONS  (PRN):  HYDROmorphone  Injectable 0.2 milliGRAM(s) IV Push every 30 minutes PRN Moderate Pain (4 - 6)  LORazepam   Injectable 0.5 milliGRAM(s) IV Push every 4 hours PRN Anxiety      ALLERGIES:  Allergies    No Known Allergies    Intolerances    atorvastatin (Muscle Pain (Severe))      LABS:              ABG:      vBG:    Micro:    Culture - Blood (collected 09-07-24 @ 16:30)  Source: .Blood Blood  Final Report (09-12-24 @ 22:01):    No growth at 5 days    Culture - Blood (collected 09-07-24 @ 16:15)  Source: .Blood Blood  Final Report (09-12-24 @ 22:01):    No growth at 5 days          RADIOLOGY & ADDITIONAL TESTS: Reviewed.   INTERVAL HPI/OVERNIGHT EVENTS:    SUBJECTIVE: Patient seen and examined at bedside. He is resting comfortably in bed. No chest pain, SOB, N/V.    ROS: All negative except as listed above.    VITAL SIGNS:  ICU Vital Signs Last 24 Hrs  T(C): 36 (17 Sep 2024 04:00), Max: 37.1 (16 Sep 2024 08:00)  T(F): 96.8 (17 Sep 2024 04:00), Max: 98.8 (16 Sep 2024 08:00)  HR: 74 (17 Sep 2024 04:00) (68 - 85)  BP: 98/55 (17 Sep 2024 04:00) (78/52 - 102/55)  BP(mean): 72 (17 Sep 2024 04:00) (61 - 74)  ABP: --  ABP(mean): --  RR: 25 (17 Sep 2024 04:00) (14 - 35)  SpO2: 97% (17 Sep 2024 04:00) (94% - 100%)    O2 Parameters below as of 17 Sep 2024 04:00  Patient On (Oxygen Delivery Method): nasal cannula  O2 Flow (L/min): 2          Plateau pressure:   P/F ratio:     09-16 @ 07:01  -  09-17 @ 07:00  --------------------------------------------------------  IN: 712.4 mL / OUT: 800 mL / NET: -87.6 mL      CAPILLARY BLOOD GLUCOSE          ECG: reviewed.    PHYSICAL EXAM:    GENERAL: NAD, lying in bed comfortably  HEAD:  Atraumatic, normocephalic  EYES: EOMI, PERRLA, dilates, conjunctiva and sclera clear  NECK: Supple, trachea midline, no JVD  HEART: Regular rate and rhythm, no murmurs, rubs, or gallops  LUNGS: Unlabored respirations.  Clear to auscultation bilaterally, no crackles, wheezing, or rhonchi  ABDOMEN: Soft, nontender, nondistended, +BS  EXTREMITIES: 2+ peripheral pulses bilaterally, cap refill<2 secs. No clubbing, cyanosis, or edema  NERVOUS SYSTEM:  A&Ox1, following commands, moving all extremities, no focal deficits   SKIN: No rashes or lesions    MEDICATIONS:  MEDICATIONS  (STANDING):  buMETAnide Injectable 2 milliGRAM(s) IV Push <User Schedule>  chlorhexidine 2% Cloths 1 Application(s) Topical daily  DOBUTamine Infusion 7.5 MICROgram(s)/kG/Min (8.6 mL/Hr) IV Continuous <Continuous>  melatonin 5 milliGRAM(s) Oral at bedtime  scopolamine 1 mG/72 Hr(s) Patch 1 Patch Transdermal every 72 hours  vasopressin Infusion 0.1 Unit(s)/Min (15 mL/Hr) IV Continuous <Continuous>    MEDICATIONS  (PRN):  HYDROmorphone  Injectable 0.2 milliGRAM(s) IV Push every 30 minutes PRN Moderate Pain (4 - 6)  LORazepam   Injectable 0.5 milliGRAM(s) IV Push every 4 hours PRN Anxiety      ALLERGIES:  Allergies    No Known Allergies    Intolerances    atorvastatin (Muscle Pain (Severe))      LABS:              ABG:      vBG:    Micro:    Culture - Blood (collected 09-07-24 @ 16:30)  Source: .Blood Blood  Final Report (09-12-24 @ 22:01):    No growth at 5 days    Culture - Blood (collected 09-07-24 @ 16:15)  Source: .Blood Blood  Final Report (09-12-24 @ 22:01):    No growth at 5 days          RADIOLOGY & ADDITIONAL TESTS: Reviewed.

## 2024-09-17 NOTE — PROGRESS NOTE ADULT - ATTENDING COMMENTS
80yo M with HFrEF on home dobutamine and squamous cell carcinoma of lung here with cardiogenic shock. Transitioning to comfort. Waiting list for PCU but will discuss re: home hospice     Neuro more alert today   Pulm O2 for comfort   CV stop vaso and switch to midodrine,  continues   Renal intermittent bumex   GI feeds as tolerated   ID no abx   Heme off AC   Endo no longer checking BG    Lines RIJ in, will attempt midline then remove intro

## 2024-09-17 NOTE — PROGRESS NOTE ADULT - ASSESSMENT
79-year-old male with a history of HFrEF on home dobutamine 7.5 with last EF less than 20%, CAD, lungs mass - SCC, and urinary retention presented transferred from clinical hospital for management failure team after having syncopal episode last night, admitted for cardiogenic shock.    Neuro  - A&0 x0  - CT head post syncopal episode with no acute injury  - continue to monitor mental status per protocol  - Dc'ed PO medications ASA, plavix, bowel regimen as patient too lethargic to tolerate    Respiratory  #hx lung mass, SCC c/b fluid overload  - Required CPAP 10/5 50% weaned off to NC 2 L; now RA  - saturating well   - continue to monitor spO2    Cardiovascular  #HFrEF (last know EF%), acute decompensated heart failure  - on home dobutamine @ 7.5 mcg  - Vasopressin 0.06 MAP goal 65-70  - Bumex 2mg IV TID  - Unable to float swan however has a cordis for monitoring of venous sats  - borderline BPs without room for afterload reduction  - holding GDMT i/s/o shock  - continue strict I&O  - Plan for no further escalation of care  - Lactate and creatinine worsening likely in setting of shock    #CAD  - on aspirin and plavix at home  - continue    Renal  BRODY on CKD  - sCr 4.09 uptrending, baseline usually around 2  - likely i/s/o cardiogenic shock  - strict I&O, trend renal function daily    GI  - diet as tolerated  - monitor for BM    Endo  - f/u a1c, trend glucose on cmp  - f/u tsh and lipid profile    Heme  - Hgb 7.7, likely 2/2 to volume overload, will ctm  - platelets low  but appears chronic  - continue to trend  DVT ppx: SCDs, given thrombocytopenia    ID  - hypothermic on admission  - f/u infectious w/u  - continue to trend wbc and fever curve  -Monitor off abx, afebrile and no leukocytosis    SKIN:  - new purpuric macules on back , with ecchymosis on lower back  - derm c/s, will CTM, conservative management per derm    #Ethics: DNR/ DNI    Lines: PIVs, RIJ Cordis from 9/8   79-year-old male with a history of HFrEF on home dobutamine 7.5 with last EF less than 20%, CAD, lungs mass - SCC, and urinary retention presented transferred from clinical hospital for management failure team after having syncopal episode last night, admitted for cardiogenic shock.    Neuro  - A&0 x1  - CT head post syncopal episode with no acute injury  - continue to monitor mental status per protocol  - Dc'ed PO medications ASA, plavix, bowel regimen as patient too lethargic to tolerate    Respiratory  #hx lung mass, SCC c/b fluid overload  - Required CPAP 10/5 50% weaned off to NC 2 L; now RA  - saturating well   - continue to monitor spO2    Cardiovascular  #HFrEF (last know EF%), acute decompensated heart failure  - on home dobutamine @ 7.5 mcg  - Vasopressin 0.06 MAP goal 65-70  - Bumex 2mg IV TID  - Unable to float swan however has a cordis for monitoring of venous sats  - borderline BPs without room for afterload reduction  - holding GDMT i/s/o shock  - continue strict I&O  - Plan for no further escalation of care  - Lactate and creatinine worsening likely in setting of shock    #CAD  - on aspirin and plavix at home  - continue    Renal  BRODY on CKD  - sCr 4.09 uptrending, baseline usually around 2  - likely i/s/o cardiogenic shock  - strict I&O, trend renal function daily    GI  - diet as tolerated  - monitor for BM    Endo  - f/u a1c, trend glucose on cmp  - f/u tsh and lipid profile    Heme  - Hgb 7.7, likely 2/2 to volume overload, will ctm  - platelets low  but appears chronic  - continue to trend  DVT ppx: SCDs, given thrombocytopenia    ID  - hypothermic on admission  - f/u infectious w/u  - continue to trend wbc and fever curve  -Monitor off abx, afebrile and no leukocytosis    SKIN:  - new purpuric macules on back , with ecchymosis on lower back  - derm c/s, will CTM, conservative management per derm    #Ethics: DNR/ DNI    Lines: PIVs, RIJ Cordis from 9/8   79-year-old male with a history of HFrEF on home dobutamine 7.5 with last EF less than 20%, CAD, lungs mass - SCC, and urinary retention presented transferred from clinical hospital for management failure team after having syncopal episode last night, admitted for cardiogenic shock.    Neuro  - A&0 x1  - CT head post syncopal episode with no acute injury  - continue to monitor mental status per protocol  - Dc'ed PO medications ASA, plavix, bowel regimen as patient too lethargic to tolerate    Respiratory  #hx lung mass, SCC c/b fluid overload  - Required CPAP 10/5 50% weaned off to NC 2 L; now RA  - saturating well   - continue to monitor spO2    Cardiovascular  #HFrEF (last know EF%), acute decompensated heart failure  - on home dobutamine @ 7.5 mcg  - Vasopressin 0.06 MAP goal 55-60, will try to wean today; transition to midodrine 10mg TID  - Bumex 2mg IV TID  - Unable to float swan however has a cordis for monitoring of venous sats  - borderline BPs without room for afterload reduction  - holding GDMT i/s/o shock  - continue strict I&O  - Plan for no further escalation of care  - Lactate and creatinine worsening likely in setting of shock    #CAD  - on aspirin and plavix at home  - continue    Renal  BRODY on CKD  - sCr 4.09 uptrending, baseline usually around 2  - likely i/s/o cardiogenic shock  - strict I&O, trend renal function daily    GI  - diet as tolerated  - monitor for BM    Endo  - f/u a1c, trend glucose on cmp  - f/u tsh and lipid profile    Heme  - Hgb 7.7, likely 2/2 to volume overload, will ctm  - platelets low  but appears chronic  - continue to trend  DVT ppx: SCDs, given thrombocytopenia    ID  - hypothermic on admission  - f/u infectious w/u  - continue to trend wbc and fever curve  -Monitor off abx, afebrile and no leukocytosis    SKIN:  - new purpuric macules on back , with ecchymosis on lower back  - derm c/s, will CTM, conservative management per derm    #Ethics: DNR/ DNI    Lines: PIVs, RIJ Cordis from 9/8

## 2024-09-18 NOTE — PROGRESS NOTE ADULT - PROBLEM SELECTOR PLAN 6
Patient will remain in PCU for management of symptoms and disposition planning which will be guided by clinical course  - Will continue to provide supportive care to patient and family.

## 2024-09-18 NOTE — PROGRESS NOTE ADULT - SUBJECTIVE AND OBJECTIVE BOX
GAP TEAM PALLIATIVE CARE UNIT PROGRESS NOTE:      [  ] Patient on hospice program.    HPI:  79-year-old male with a history of HFrEF on home dobutamine 7.5 with last EF less than 20%, CAD, lungs mass - SCC, and urinary retention presented transferred from [facility] for management of heart failure after having syncopal episode last night.  Patient also having reduced urine output despite 6 mg of Bumex twice a day.  At [facility] patient had a workup which was notable for sodium of 124, creatinine to 4.6 from his baseline of approximately 2-2.1, proBNP 5220, lactate 1.9.  He was transferred here for further evaluation and  heart failure management, admitted to CICU.    On admission to CICU he is A&O, saturating well on room air, sinus rhythm 75 /59. He denies dizziness, chest pain, sob, N&V on admission    INDICATION FOR PALLIATIVE CARE UNIT SERVICES/Interval HPI:  Patient unable to be weaned off vasopressors and not responding to diuretics.  After discussions with wife and patient, patient transitioned to comfort care.  Patient requires inpatient services due to IV requirements of dobutamine gtt and vasopressin.    OVERNIGHT EVENTS:  Became agitated last night and required 2 PRN 0.5mg lorazepam and 2 PRN 0.5mg hydromorphone dyspnea.  There is a concern that patient may attempt to pull at his IV access due to agitation.    Subjective:  Patient difficult to arouse this morning but responding to tactile stimuli.    DNR on chart:  DNR  DNI        Allergies    No Known Allergies    Intolerances    atorvastatin (Muscle Pain (Severe))  MEDICATIONS  (STANDING):  buMETAnide Injectable 2 milliGRAM(s) IV Push <User Schedule>  chlorhexidine 2% Cloths 1 Application(s) Topical daily  DOBUTamine Infusion 7.504 MICROgram(s)/kG/Min (8.6 mL/Hr) IV Continuous <Continuous>  melatonin 5 milliGRAM(s) Oral at bedtime  midodrine 10 milliGRAM(s) Oral every 8 hours  tamsulosin 0.4 milliGRAM(s) Oral at bedtime  vasopressin Infusion 0.04 Unit(s)/Min (6 mL/Hr) IV Continuous <Continuous>    MEDICATIONS  (PRN):  acetaminophen  Suppository .. 650 milliGRAM(s) Rectal every 6 hours PRN Temp greater or equal to 38C (100.4F), Mild Pain (1 - 3)  bisacodyl Suppository 10 milliGRAM(s) Rectal daily PRN Constipation  glycopyrrolate Injectable 0.4 milliGRAM(s) IV Push every 6 hours PRN Secretions  HYDROmorphone  Injectable 0.5 milliGRAM(s) IV Push every 1 hour PRN Severe Pain (7 - 10)  HYDROmorphone  Injectable 0.2 milliGRAM(s) IV Push every 1 hour PRN Moderate Pain (4 - 6)  HYDROmorphone  Injectable 0.5 milliGRAM(s) IV Push every 1 hour PRN Dyspnea  LORazepam   Injectable 0.5 milliGRAM(s) IV Push every 1 hour PRN Anxiety    ITEMS UNCHECKED ARE NOT PRESENT    PRESENT SYMPTOMS: [X]Unable to self-report see PAINAD, RDOS below  Source if other than patient:  [ ]Family   [ ]Team     Pain: [ ] yes [ ] no  QOL impact -   Location -                    Aggravating factors -  Quality -  Radiation -  Timing-  Severity (0-10 scale):  Minimal acceptable level (0-10 scale):       PCSSQ [Palliative Care Spiritual Screening Question]   Severity (0-10):  Score of 4 or > indicate consideration of Chaplaincy referral.  Chaplaincy Referral: [ ] yes [ ] refused [ ] following [ ] deferred    Caregiver [place]? : [X] yes [ ] no [ ] deferred:  Social work referral [X] Patient & Family Centered Care Referral [ ]   Anticipatory Grief present?:  [X] yes [ ] no [ ] deferred:  Social work referral [X] Patient & Family Centered Care Referral [ ]  	  Other Symptoms:  [ ]All other review of systems negative [X] Patient unable to give ROS    PHYSICAL EXAM:   Vital Signs Last 24 Hrs  T(C): 36.2 (18 Sep 2024 08:37), Max: 36.3 (17 Sep 2024 15:57)  T(F): 97.1 (18 Sep 2024 08:37), Max: 97.4 (17 Sep 2024 15:57)  HR: 37 (18 Sep 2024 08:37) (37 - 78)  BP: 85/52 (18 Sep 2024 08:37) (85/52 - 102/52)  BP(mean): 70 (17 Sep 2024 15:15) (66 - 75)  RR: 18 (18 Sep 2024 08:37) (15 - 36)  SpO2: 93% (18 Sep 2024 08:37) (93% - 100%)    Parameters below as of 18 Sep 2024 08:37  Patient On (Oxygen Delivery Method): nasal cannula     I&O's Summary    17 Sep 2024 07:01  -  18 Sep 2024 07:00  --------------------------------------------------------  IN: 267.9 mL / OUT: 1580 mL / NET: -1312.1 mL      GENERAL: [ ] Cachexia  [ ]Alert  [ ]Oriented x   [ ]Lethargic  [ ]Unarousable  [ ]Verbal  [ ]Non-Verbal  Behavioral:   [ ] Anxiety  [ ] Delirium [ ] Agitation [ ] Other  HEENT:  [ ]Normal   [ ]Dry mouth   [ ]ET Tube/Trach  [ ]Oral lesions  PULMONARY:   [ ]Clear [ ]Tachypnea  [ ]Audible excessive secretions   [ ]Rhonchi        [ ]Right [ ]Left [ ]Bilateral  [ ]Crackles        [ ]Right [ ]Left [ ]Bilateral  [ ]Wheezing     [ ]Right [ ]Left [ ]Bilateral  [ ]Diminished BS [ ]Right [ ]Left [ ]Bilateral    CARDIOVASCULAR:    [ ]Regular [ ]Irregular [ ]Tachy  [ ]Christiano [ ]Murmur [ ]Other  GASTROINTESTINAL:  [ ]Soft  [ ]Distended   [ ]+BS  [ ]Non tender [ ]Tender  [ ]Other [ ]PEG [ ]OGT/ NGT   Last BM:    GENITOURINARY:  [ ]Normal [ ] Incontinent   [ ]Oliguria/Anuria   [ ]Chandler  MUSCULOSKELETAL:   [ ]Normal   [ ]Weakness  [ ]Bed/Wheelchair bound [ ]Edema  NEUROLOGIC:   [ ]No focal deficits  [ ] Cognitive impairment  [ ] Dysphagia [ ]Dysarthria [ ] Paresis [ ]Other   SKIN:   [ ]Normal  [ ]Rash  [ ]Other  [ ]Pressure ulcer(s)  [ ]y [ ]n  Present on admission      CRITICAL CARE:  [ ] Shock Present  [ ]Septic [ ]Cardiogenic [ ]Neurologic [ ]Hypovolemic  [ ]  Vasopressors [ ]  Inotropes   [ ] Respiratory failure present [ ] Mechanical Ventilation [ ] Non-invasive ventilatory support [ ] High-Flow    [ ] Acute  [ ] Chronic [ ] Hypoxic  [ ] Hypercarbic [ ] Other  [ ] Other organ failure     LABS:            RADIOLOGY & ADDITIONAL STUDIES:    PROTEIN CALORIE MALNUTRITION: [ ] mild [ ] moderate [ ] severe  [ ] underweight [ ] morbid obesity    https://www.andeal.org/gem/2440/web/files/ONC/Table_Clinical%20Characteristics%20to%20Document%20Malnutrition-White%20JV%20et%20al%202012.pdf        [ ] PPSV2 < or = 30% [ ] significant weight loss [ ] poor nutritional intake [ ] anasarca   Artificial Nutrition [ ]     Other REFERRALS:    [ ] Hospice  [ ]Child Life  [ ]Social Work  [ ]Case management [ ]Holistic Therapy [ ] Physical Therapy [ ] Dietary         Palliative Performance Scale:  http://npcrc.org/files/news/palliative_performance_scale_ppsv2.pdf  (Ctrl +  left click to view)  Respiratory Distress Observation Tool:  https://homecareinformation.net/handouts/hen/Respiratory_Distress_Observation_Scale.pdf (Ctrl +  left click to view)  PAINAD Score:  http://geriatrictoolkit.[place].[facility]/cog/painad.pdf (Ctrl +  left click to view)   GAP TEAM PALLIATIVE CARE UNIT PROGRESS NOTE:      [  ] Patient on hospice program.    HPI:  79-year-old male with a history of HFrEF on home dobutamine 7.5 with last EF less than 20%, CAD, lungs mass - SCC, and urinary retention presented transferred from [facility] for management of heart failure after having syncopal episode last night.  Patient also having reduced urine output despite 6 mg of Bumex twice a day.  At [facility] patient had a workup which was notable for sodium of 124, creatinine to 4.6 from his baseline of approximately 2-2.1, proBNP 5220, lactate 1.9.  He was transferred here for further evaluation and  heart failure management, admitted to CICU.    On admission to CICU he is A&O, saturating well on room air, sinus rhythm 75 /59. He denies dizziness, chest pain, sob, N&V on admission    INDICATION FOR PALLIATIVE CARE UNIT SERVICES/Interval HPI:  Patient unable to be weaned off vasopressors and not responding to diuretics.  After discussions with wife and patient, patient transitioned to comfort care.  Patient requires inpatient services due to IV requirements of dobutamine gtt and vasopressin.    OVERNIGHT EVENTS:  Became agitated last night and required 2 PRN 0.5mg lorazepam and 2 PRN 0.2mg hydromorphone for dyspnea.  There is a concern that patient may attempt to pull at his IV access due to agitation.  Agitation may have been secondary to placement of [place]    Subjective:  Patient difficult to arouse this morning but responding to tactile stimuli.    DNR on chart:  DNR  DNI        Allergies    No Known Allergies    Intolerances    atorvastatin (Muscle Pain (Severe))  MEDICATIONS  (STANDING):  buMETAnide Injectable 2 milliGRAM(s) IV Push <User Schedule>  chlorhexidine 2% Cloths 1 Application(s) Topical daily  DOBUTamine Infusion 7.504 MICROgram(s)/kG/Min (8.6 mL/Hr) IV Continuous <Continuous>  melatonin 5 milliGRAM(s) Oral at bedtime  midodrine 10 milliGRAM(s) Oral every 8 hours  tamsulosin 0.4 milliGRAM(s) Oral at bedtime  vasopressin Infusion 0.04 Unit(s)/Min (6 mL/Hr) IV Continuous <Continuous>    MEDICATIONS  (PRN):  acetaminophen  Suppository .. 650 milliGRAM(s) Rectal every 6 hours PRN Temp greater or equal to 38C (100.4F), Mild Pain (1 - 3)  bisacodyl Suppository 10 milliGRAM(s) Rectal daily PRN Constipation  glycopyrrolate Injectable 0.4 milliGRAM(s) IV Push every 6 hours PRN Secretions  HYDROmorphone  Injectable 0.5 milliGRAM(s) IV Push every 1 hour PRN Severe Pain (7 - 10)  HYDROmorphone  Injectable 0.2 milliGRAM(s) IV Push every 1 hour PRN Moderate Pain (4 - 6)  HYDROmorphone  Injectable 0.5 milliGRAM(s) IV Push every 1 hour PRN Dyspnea  LORazepam   Injectable 0.5 milliGRAM(s) IV Push every 1 hour PRN Anxiety    ITEMS UNCHECKED ARE NOT PRESENT    PRESENT SYMPTOMS: [X]Unable to self-report see PAINAD, RDOS below  Source if other than patient:  [ ]Family   [ ]Team     Pain: [ ] yes [ ] no  QOL impact -   Location -                    Aggravating factors -  Quality -  Radiation -  Timing-  Severity (0-10 scale):  Minimal acceptable level (0-10 scale):       PCSSQ [Palliative Care Spiritual Screening Question]   Severity (0-10):  Score of 4 or > indicate consideration of Chaplaincy referral.  Chaplaincy Referral: [ ] yes [ ] refused [ ] following [ ] deferred    Caregiver [place]? : [X] yes [ ] no [ ] deferred:  Social work referral [X] Patient & Family Centered Care Referral [ ]   Anticipatory Grief present?:  [X] yes [ ] no [ ] deferred:  Social work referral [X] Patient & Family Centered Care Referral [ ]  	  Other Symptoms:  [ ]All other review of systems negative [X] Patient unable to give ROS    PHYSICAL EXAM:   Vital Signs Last 24 Hrs  T(C): 36.2 (18 Sep 2024 08:37), Max: 36.3 (17 Sep 2024 15:57)  T(F): 97.1 (18 Sep 2024 08:37), Max: 97.4 (17 Sep 2024 15:57)  HR: 37 (18 Sep 2024 08:37) (37 - 78)  BP: 85/52 (18 Sep 2024 08:37) (85/52 - 102/52)  BP(mean): 70 (17 Sep 2024 15:15) (66 - 75)  RR: 18 (18 Sep 2024 08:37) (15 - 36)  SpO2: 93% (18 Sep 2024 08:37) (93% - 100%)    Parameters below as of 18 Sep 2024 08:37  Patient On (Oxygen Delivery Method): nasal cannula     I&O's Summary    17 Sep 2024 07:01  -  18 Sep 2024 07:00  --------------------------------------------------------  IN: 267.9 mL / OUT: 1580 mL / NET: -1312.1 mL      GENERAL: [X] Cachexia  [ ]Alert  [ ]Oriented x   [ ]Lethargic  [X]Unarousable  [ ]Verbal  [X]Non-Verbal [X] Responding to tactile stimuli  Behavioral:   [ ] Anxiety  [ ] Delirium [ ] Agitation [X] Other: Appears comfortable  HEENT:  [X]Normal   [ ]Dry mouth   [ ]ET Tube/Trach  [ ]Oral lesions  PULMONARY: [X] on 2L, saturating well  [X]Clear [ ]Tachypnea  [ ]Audible excessive secretions   [ ]Rhonchi        [ ]Right [ ]Left [ ]Bilateral  [ ]Crackles        [ ]Right [ ]Left [ ]Bilateral  [ ]Wheezing     [ ]Right [ ]Left [ ]Bilateral  [ ]Diminished BS [ ]Right [ ]Left [ ]Bilateral    CARDIOVASCULAR:    [ ]Regular [X]Irregular [ ]Tachy  [ ]Christiano [ ]Murmur [X]Other:  S3  GASTROINTESTINAL:  [X]Soft  [ ]Distended   [X]+BS  [X]Non tender [ ]Tender  [ ]Other [ ]PEG [ ]OGT/ NGT   Last BM:  9/15  GENITOURINARY:  [ ]Normal [ ] Incontinent   [ ]Oliguria/Anuria   [X]Chandler  MUSCULOSKELETAL:   [ ]Normal   [ ]Weakness  [X]Bed/Wheelchair bound [ ]Edema  NEUROLOGIC:   [ ]No focal deficits  [ ] Cognitive impairment  [ ] Dysphagia [ ]Dysarthria [ ] Paresis [X]Other: lethargic but responding to tactile stimuli  SKIN:   [X]Normal  [ ]Rash  [ ]Other  [ ]Pressure ulcer(s)  [ ]y [X]n  Present on admission      CRITICAL CARE:  [X] Shock Present  [ ]Septic [X]Cardiogenic [ ]Neurologic [ ]Hypovolemic  [X]  Vasopressors [X]  Inotropes   [ ] Respiratory failure present [ ] Mechanical Ventilation [ ] Non-invasive ventilatory support [ ] High-Flow    [ ] Acute  [ ] Chronic [ ] Hypoxic  [ ] Hypercarbic [ ] Other  [X] Other organ failure: Renal Failure    LABS:            RADIOLOGY & ADDITIONAL STUDIES:    PROTEIN CALORIE MALNUTRITION: [ ] mild [ ] moderate [X] severe  [X] underweight [ ] morbid obesity    https://www.andeal.org/vault/2440/web/files/ONC/Table_Clinical%20Characteristics%20to%20Document%20Malnutrition-White%20JV%20et%20al%202012.pdf        [X] PPSV2 < or = 30% [ ] significant weight loss [X] poor nutritional intake [ ] anasarca   Artificial Nutrition [ ]     Other REFERRALS:    [ ] Hospice  [ ]Child Life  [X]Social Work  [ ]Case management [ ]Holistic Therapy [ ] Physical Therapy [ ] Dietary         Palliative Performance Scale:  http://npcrc.org/files/news/palliative_performance_scale_ppsv2.pdf  (Ctrl +  left click to view)  Respiratory Distress Observation Tool:  https://homecareinformation.net/handouts/hen/Respiratory_Distress_Observation_Scale.pdf (Ctrl +  left click to view)  PAINAD Score:  http://geriatrictoolkit.[place].[facility]/cog/painad.pdf (Ctrl +  left click to view)   GAP TEAM PALLIATIVE CARE UNIT PROGRESS NOTE:      [  ] Patient on hospice program.    HPI:  79-year-old male with a history of HFrEF on home dobutamine 7.5 with last EF less than 20%, CAD, lungs mass - SCC, and urinary retention presented transferred from [facility] for management of heart failure after having syncopal episode last night.  Patient also having reduced urine output despite 6 mg of Bumex twice a day.  At [facility] patient had a workup which was notable for sodium of 124, creatinine to 4.6 from his baseline of approximately 2-2.1, proBNP 5220, lactate 1.9.  He was transferred here for further evaluation and  heart failure management, admitted to CICU.    On admission to CICU he is A&O, saturating well on room air, sinus rhythm 75 /59. He denies dizziness, chest pain, sob, N&V on admission    INDICATION FOR PALLIATIVE CARE UNIT SERVICES/Interval HPI:  Patient unable to be weaned off vasopressors and not responding to diuretics.  After discussions with wife and patient, patient transitioned to comfort care.  Patient requires inpatient services due to IV requirements of dobutamine gtt and vasopressin.    OVERNIGHT EVENTS:  Became agitated last night and required 2 PRN 0.5mg lorazepam and 2 PRN 0.2mg hydromorphone for dyspnea.  There is a concern that patient may attempt to pull at his IV access due to agitation.  Agitation may have been secondary to placement of [place]    Subjective:  Patient difficult to arouse this morning but responding to tactile stimuli.    DNR on chart: yes  DNI    Allergies    No Known Allergies    Intolerances    atorvastatin (Muscle Pain (Severe))  MEDICATIONS  (STANDING):  buMETAnide Injectable 2 milliGRAM(s) IV Push <User Schedule>  chlorhexidine 2% Cloths 1 Application(s) Topical daily  DOBUTamine Infusion 7.504 MICROgram(s)/kG/Min (8.6 mL/Hr) IV Continuous <Continuous>  melatonin 5 milliGRAM(s) Oral at bedtime  midodrine 10 milliGRAM(s) Oral every 8 hours  tamsulosin 0.4 milliGRAM(s) Oral at bedtime  vasopressin Infusion 0.04 Unit(s)/Min (6 mL/Hr) IV Continuous <Continuous>    MEDICATIONS  (PRN):  acetaminophen  Suppository .. 650 milliGRAM(s) Rectal every 6 hours PRN Temp greater or equal to 38C (100.4F), Mild Pain (1 - 3)  bisacodyl Suppository 10 milliGRAM(s) Rectal daily PRN Constipation  glycopyrrolate Injectable 0.4 milliGRAM(s) IV Push every 6 hours PRN Secretions  HYDROmorphone  Injectable 0.5 milliGRAM(s) IV Push every 1 hour PRN Severe Pain (7 - 10)  HYDROmorphone  Injectable 0.2 milliGRAM(s) IV Push every 1 hour PRN Moderate Pain (4 - 6)  HYDROmorphone  Injectable 0.5 milliGRAM(s) IV Push every 1 hour PRN Dyspnea  LORazepam   Injectable 0.5 milliGRAM(s) IV Push every 1 hour PRN Anxiety    ITEMS UNCHECKED ARE NOT PRESENT    PRESENT SYMPTOMS: [X]Unable to self-report see PAINAD, RDOS below  Source if other than patient:  [ ]Family   [ ]Team     Pain: [ ] yes [ ] no  QOL impact -   Location -                    Aggravating factors -  Quality -  Radiation -  Timing-  Severity (0-10 scale):  Minimal acceptable level (0-10 scale):       PCSSQ [Palliative Care Spiritual Screening Question]   Severity (0-10):  Score of 4 or > indicate consideration of Chaplaincy referral.  Chaplaincy Referral: [ ] yes [ ] refused [ ] following [ ] deferred    Caregiver [place]? : [X] yes [ ] no [ ] deferred:  Social work referral [X] Patient & Family Centered Care Referral [ ]   Anticipatory Grief present?:  [X] yes [ ] no [ ] deferred:  Social work referral [X] Patient & Family Centered Care Referral [ ]  	  Other Symptoms:  [ ]All other review of systems negative [X] Patient unable to give ROS    PHYSICAL EXAM:   Vital Signs Last 24 Hrs  T(C): 36.2 (18 Sep 2024 08:37), Max: 36.3 (17 Sep 2024 15:57)  T(F): 97.1 (18 Sep 2024 08:37), Max: 97.4 (17 Sep 2024 15:57)  HR: 37 (18 Sep 2024 08:37) (37 - 78)  BP: 85/52 (18 Sep 2024 08:37) (85/52 - 102/52)  BP(mean): 70 (17 Sep 2024 15:15) (66 - 75)  RR: 18 (18 Sep 2024 08:37) (15 - 36)  SpO2: 93% (18 Sep 2024 08:37) (93% - 100%)    Parameters below as of 18 Sep 2024 08:37  Patient On (Oxygen Delivery Method): nasal cannula     I&O's Summary    17 Sep 2024 07:01  -  18 Sep 2024 07:00  --------------------------------------------------------  IN: 267.9 mL / OUT: 1580 mL / NET: -1312.1 mL      GENERAL: [X] Cachexia  [ ]Alert  [ ]Oriented x   [ ]Lethargic  [X]Unarousable  [ ]Verbal  [X]Non-Verbal [X] Responding to tactile stimuli  Behavioral:   [ ] Anxiety  [ ] Delirium [ ] Agitation [X] Other: Appears comfortable  HEENT:  [X]Normal   [ ]Dry mouth   [ ]ET Tube/Trach  [ ]Oral lesions  PULMONARY: [X] on 2L, saturating well  [X]Clear [ ]Tachypnea  [ ]Audible excessive secretions   [ ]Rhonchi        [ ]Right [ ]Left [ ]Bilateral  [ ]Crackles        [ ]Right [ ]Left [ ]Bilateral  [ ]Wheezing     [ ]Right [ ]Left [ ]Bilateral  [ ]Diminished BS [ ]Right [ ]Left [ ]Bilateral    CARDIOVASCULAR:    [ ]Regular [X]Irregular [ ]Tachy  [ ]Christiano [ ]Murmur [X]Other:  S3  GASTROINTESTINAL:  [X]Soft  [ ]Distended   [X]+BS  [X]Non tender [ ]Tender  [ ]Other [ ]PEG [ ]OGT/ NGT   Last BM:  9/15  GENITOURINARY:  [ ]Normal [ ] Incontinent   [ ]Oliguria/Anuria   [X]Chandler  MUSCULOSKELETAL:   [ ]Normal   [ ]Weakness  [X]Bed/Wheelchair bound [ ]Edema  NEUROLOGIC:   [ ]No focal deficits  [ ] Cognitive impairment  [ ] Dysphagia [ ]Dysarthria [ ] Paresis [X]Other: lethargic but responding to tactile stimuli  SKIN:   [X]Normal  [ ]Rash  [ ]Other  [ ]Pressure ulcer(s)  [ ]y [X]n  Present on admission      CRITICAL CARE:  [X] Shock Present  [ ]Septic [X]Cardiogenic [ ]Neurologic [ ]Hypovolemic  [X]  Vasopressors [X]  Inotropes   [ ] Respiratory failure present [ ] Mechanical Ventilation [ ] Non-invasive ventilatory support [ ] High-Flow    [ ] Acute  [ ] Chronic [ ] Hypoxic  [ ] Hypercarbic [ ] Other  [X] Other organ failure: Renal Failure    LABS:                          8.0    5.14  )-----------( 69       ( 14 Sep 2024 00:37 )             24.4   09-14    126[L]  |  86[L]  |  100[H]  ----------------------------<  163[H]  3.8   |  22  |  4.27[H]      RADIOLOGY & ADDITIONAL STUDIES:  < from: Xray Chest 1 View- PORTABLE-Urgent (Xray Chest 1 View- PORTABLE-Urgent .) (09.08.24 @ 01:26) >  IMPRESSION:  Right IJ with tip overlying right brachiocephalic, SVC junction.    --- End of Report ---           DEANA SAAH MD; Resident Radiologist  This document has been electronically signed.  JAMES NOVAK MD; Attending Radiologist  This document has been electronically signed. Sep  8 2024  9:42AM    < end of copied text >  < from: US Renal (09.07.24 @ 18:44) >  IMPRESSION:  *  Right renal cysts and bilateral renal parenchymal disease.  *  No hydronephrosis.  *  Thickened trabeculated bladder wall.  *  Trace ascites.        --- End of Report ---          MAULIK OWUSU DO; Resident Radiologist  This document has been electronically signed.  JOSE RAUL FOSTER MD; Attending Radiologist  This document has been electronically signed. Sep  7 2024  7:50PM    < end of copied text >  < from: CT Head No Cont (09.07.24 @ 11:16) >  IMPRESSION:  No evidence of an acute traumatic intracranial injury.    --- End of Report ---            ROMANA TA MD; Attending Radiologist  This document has been electronically signed. Sep  7 2024 11:55AM    < end of copied text >  < from: TTE W or WO Ultrasound Enhancing Agent (09.08.24 @ 07:21) >     CONCLUSIONS:      1. The interventricular septum is flattened in systole and diastole consistent with right ventricular pressure and volume overload. Left ventricular systolic function is severely decreased with an ejection fraction visually estimated at 20 to 25 %. Regional wall motion abnormalities present.   2. Multiple segmental abnormalities exist. See findings.   3. Severely enlarged right ventricular cavity size, with normal wall thickness, and moderately reduced right ventricular systolic function.   4. The right atrium is severely dilated.   5. Moderate mitral regurgitation.   6. Estimated pulmonary artery systolic pressure is 49 mmHg, consistent with moderate pulmonary hypertension.   7. No prior echocardiogram is available for comparison.   8. Right pleural effusion noted.   9. The inferior vena cava is dilated measuring 2.20 cm in diameter, (dilated >2.1cm) with abnormal inspiratory collapse (abnormal <50%) consistent with elevated right atrial pressure (~15, range 10-20mmHg).  10. There is increased LV mass and eccentric hypertrophy.  11. There is no evidence of a left ventricular thrombus.    ________________________________________________________________________________________    < end of copied text >  < from: TTE W or WO Ultrasound Enhancing Agent (09.08.24 @ 07:21) >  ________________________________________________________________________________________  Electronically signed on 9/8/2024 at 2:14:28 PM by Deo Mckeon M.D.         *** Final ***    < end of copied text >      PROTEIN CALORIE MALNUTRITION: [ ] mild [ ] moderate [X] severe  [X] underweight [ ] morbid obesity    https://www.andeal.org/vault/2440/web/files/ONC/Table_Clinical%20Characteristics%20to%20Document%20Malnutrition-White%20JV%20et%20al%202012.pdf        [X] PPSV2 < or = 30% [ ] significant weight loss [X] poor nutritional intake [ ] anasarca   Artificial Nutrition [ ]     Other REFERRALS:    [ ] Hospice  [ ]Child Life  [X]Social Work  [ ]Case management [ ]Holistic Therapy [ ] Physical Therapy [ ] Dietary                                 Care Coordination Assessment 201    COGNITIVE/LEARNING 201  Mental Status    Answers: Alert,  Answers: Oriented: Person,  Answers: Oriented: Place,  Answers: Oriented: Time,  Answers: Oriented: Situation    Ability to make needs known    Answers: Understands/communicates without difficulty    ADMISSION HISTORY 201  Admitted From    Answers: Acute Hospital    Functional Status Prior to Admission    Answers: Assistive equipment,  Answers: Assistive person    Services Present on Admission    Answers: DME; specify RW,  Answers: Home Health Care,  Answers: Infusion Therapy,  Answers: Physical, occupational, speech therapist    FINANCIAL 201  Does patient have financial concerns for discharge?    Answers: None    LIVING ARRANGEMENTS/SUPPORT 201  Housing Environment    Answers: House    Living Arrangements    Answers: Lives with spouse, significant other    Sources of support/caregivers    Answers: Spouse/Significant Other    CAREGIVER CONTACT 201  Does the patient wish to identify a Caregiver?    Answers: Yes    Caregiver Contact    Answers: Caregiver Name: Kenia,  Answers: Caregiver Relationship: Wife,  Answers: Caregiver Contact Number: [phone]    EMERGENCY CONTACTS OUTSIDE HOME 201  Emergency Contact    Answers: Emergency Contact Name Kenia,  Answers: Emergency Contact Phone # [phone],  Answers: Emergency Contact Relationship Wife    DISCHARGE PLANNING 201  Potential Discharge Plan and Services    Answers: Anticipated Needs Unclear at Present    SCREENING 201  Social Work Screen and Referral    Answers: Adjustment to Illness/Difficulty Coping    SUMMARY 201  Initial Clinical Summary    Notes: Social work reviewed patients chart via high-risk screening. Patient is  a 79 year-old, English speaking male. As per H&P patient was transferred from  [facility] due to Cardiogenic shock. Patient has PMHX of HFrEF on home  dobutamine 7.5 with last EF less than 20%, CAD, lungs mass - SCC, and urinary  retention presented transferred from [facility] for management of heart  failure after having syncopal episode last night.  Patient also having reduced  urine output despite 6 mg of Bumex twice a day.  At [facility] patient had a  workup which was notable for sodium of 124, creatinine to 4.6 from his baseline  of approximately 2-2.1, proBNP 5220, lactate 1.9.  He was transferred here for  further evaluation and  heart failure management, admitted to CICU.    Social work met patient at bedside to provide supportive intervention. Social  work introduced self and role. Patient reported having clear communication with  the medical team. Patient expressed he is coping as expected. Patient resides  in in a house with his wife, Kenia([phone]). Patient reports their house  has 3 steps to enter with a railing. Patient required assistance with ADLs  prior to admission. Patients wife was assisting patient. Patient reports he  using a walker at night. Patient was also receiving home P/T via [facility]. Patient believes he was also on home dobutamine via [facility].  Social work explored if patient has a HCP. Patient reported his wife is his  hcp. Social work confirmed a healthcare proxy was completed at [facility] listing his wife as his proxy. Patient confirmed he has a previous  MOLST form requesting DNR,  non-invasive breathing trials, and no Dialysis.  Social work asked if patient still those has wishes. Patient reported that he  does not want to be intubated but if his heart was to stop he would want chest  compression and be resuscitated. Patient also stated he would like to receive  HD if needed.  Social work provided support and stated they will inform the  team of the above and stated they will follow up and to have a goals of care  conversation and complete a new MOLST to reflect his wishes. Social work  notified the medical team of the above conversation.     Patient is covered by Medicare Inpatient. Patient denied any financial,  cultural, or spiritual needs at this time. Patient denied any substance use.  Patient endorses a hx of depression. Patient reports he is connected with a  psychiatrist and is taking Prozac every 3rd day. Patient reports it is helpful  to him and declined additional resources.  PCP is Dr. Liu in [place].  Social work contact information provided. Social work will continue to  collaborate with interdisciplinary team.    Anticipated Discharge Plan and Services    Notes: Discharge plan remains unclear due to ongoing hospital course. Social  work will remain available.       Electronically signed by:  Violeta Benz  Electronically signed on:  2024-09-10  11:21      Palliative Performance Scale:  http://npcrc.org/files/news/palliative_performance_scale_ppsv2.pdf  (Ctrl +  left click to view)  Respiratory Distress Observation Tool:  https://homecareinformation.net/handouts/hen/Respiratory_Distress_Observation_Scale.pdf (Ctrl +  left click to view)  PAINAD Score:  http://geriatrictoolkit.[email or web address]/cog/painad.pdf (Ctrl +  left click to view)

## 2024-09-18 NOTE — PROGRESS NOTE ADULT - PROBLEM SELECTOR PLAN 4
- Continue Hydromorphone 0.5mg IV [id number] PRN for dyspnea  - Continue lorazepam 0.5mg IV q1hour PRN for dyspnea In part in setting of heart failure.  On 2L.    - Continue Hydromorphone 0.2mg IV [id number] PRN for dyspnea  - Continue Lorazepam 0.5mg IV q1hour PRN for dyspnea  - Bumetanide 2mg IV PRN for SOB/edema

## 2024-09-18 NOTE — PROGRESS NOTE ADULT - PROBLEM SELECTOR PLAN 3
Overnight 9/17 patient became agitated and there was a concern that he may remove his IV access, thereby disconnecting imhself from dobutamine  - Continue lorazepam 0.5mg IV q1hour PRN for agitation/anxiety Overnight 9/17 patient became agitated (likely due to placing Chandler) and there was a concern that he may remove his Shiley IV access, thereby disconnecting himself from dobutamine  - Continue lorazepam 0.5mg IV q1hour PRN for agitation/anxiety

## 2024-09-18 NOTE — PROGRESS NOTE ADULT - PROBLEM SELECTOR PLAN 2
- Continue Hydromorphone 0.2mg IV [id number] PRN for moderate pain  - Continue Hydromorphone 0.5mg IV [id number] PRN for severe pain - Continue Hydromorphone 0.2mg IV [id number] PRN for moderate pain and severe pain - Continue Hydromorphone 0.2mg IV [id number] PRN for moderate pain and severe pain  Bowel regimen while on opioids.  Monitor for constipation.

## 2024-09-18 NOTE — PROGRESS NOTE ADULT - ASSESSMENT
79-year-old male with a history of HFrEF on home dobutamine 7.5 with last EF less than 20%, CAD, lungs mass - SCC, and urinary retention presented transferred from [facility] for management of heart failure after having syncopal episode, unable to wean off pressors.  Patient has since transitioned to comfort care and transferred to PCU. 79-year-old male with a history of HFrEF on home dobutamine 7.5 with last EF less than 20%, CAD, lungs mass - SCC, and urinary retention presented transferred from [facility] for management of heart failure after having syncopal episode, unable to wean off pressors and inotropes.  Patient has since transitioned to comfort care and transferred to PCU.  Will unlikely be able to wean off vasopressin

## 2024-09-18 NOTE — PROGRESS NOTE ADULT - ATTENDING COMMENTS
78 y/o male with hx HFrEF (20%) dobutamine dependent, admitted with cardiogenic shock, deemed not a candidate for advanced heart failure therapies.  Pt with kidney failure as well.  Transferred to PCU on vaso/dobutamine with plan for titration of vaso to off if possible as goal is to home home for remainder of days.      I have reviewed all documentation from prior primary team and consultants, as well as relevant imaging and laboratory data as this patient is new to me.    In the setting of parenteral controlled substance administration, clinical monitoring required for side effects such as respiratory depression, constipation and opioid induced neurotoxicity due to organ failure.    Wife at bedside, updated as to current clinical condition and plan of care.  Educated as to what to expect, questions answered and emotional support provided.  Agrees to PICC line placement so that we can remove rt RONY tompkins for patient comfort and safety.    Patient assessment and plan discussed on interdisciplinary team rounds today.

## 2024-09-18 NOTE — PROGRESS NOTE ADULT - TIME BILLING
Total Time Spent 75 minutes.    This includes chart review, patient assessment, discussion and collaboration with interdisciplinary team members, excluding ACP.    COUNSELING:  Face to face meeting to discuss Advanced Care Planning- Time Spent 46 minutes
minutes spent on total encounter. The necessity of the time spent during the encounter on this date of service was due to:     Total time spent including the following  [ ] Physical chart review and documentation   An extensive review of the physical chart, electronic health record, and documentation was conducted to obtain collateral information including but not limited to:   - Current inpatient records (ED, H&P, primary team, and consultants [IR])   - Inpatient values/results (CBC, CMP, CT)   - Prior inpatient records (prior GaP notes when he was admitted to [facility])   - Current or proposed treatment plans   - Pharmacotherapy review   [ ]discussion with the interdisciplinary palliative care team -RN, , clinicians, trainees,   [ ]discussion with the patient/family/decision maker  [ ]Physical Exam and/or review of systems   [ ]Formulation of assessment and plan   [ ]Evaluating for response to treatment and side effects of opioids or benzodiazepines.  [ ]Review of care coordination documentation.

## 2024-09-18 NOTE — PROGRESS NOTE ADULT - PROBLEM SELECTOR PLAN 1
Patient has HFrEF (EF 20%) on home dobutamine 7.5 presenting with worsening heart failure requiring increased doses of diuretics.  Requiring dobutamine and vasopressin.  On 2L  - Continue home dobutamine 7.5 gtt  - Continue vasopressin 0.04 gtt  - Continue midodrine 10mg PO q8hour as patient tolerates  - Continue Bumetanide 2mg IV TID  - Continue 2L oxygen for comfort Patient has HFrEF (EF 20%) on home dobutamine 7.5 presenting with worsening heart failure requiring increased doses of diuretics.  Requiring dobutamine and vasopressin.  Will attempt to wean vasopressin so that patient could go home; however, patient's blood pressure currently would not be able to tolerate it.  Will not increase midodrine in setting of low heart rate, increasing afterload in heart failure, and likely impending loss of oral access.  Patient should get PICC line if wife is amenable, currently has Shiley  - Continue home dobutamine 7.5 gtt  - Continue vasopressin 0.04 gtt, wean as tolerated  - Continue midodrine 10mg PO q8hour as patient tolerates  - Discontinue Bumetanide 2mg IV TID - has been responding to it but is currently not overtly hypervolemic and is on 2L of oxygen  - Bumetanide 2mg IV PRN for SOB/edema  - Continue 2L oxygen for comfort  - Place PICC line and remove Shiley if wife is amenable

## 2024-09-19 NOTE — ADVANCED PRACTICE NURSE CONSULT - ASSESSMENT
Central Line Catheter Insertion Note  Patient or patient representative educated about central line associated blood stream infection prevention practices.  Catheter type: 4F,  DL Picc  : Bard  Power injectable: Yes  LOT#[id number]  EXP DATE 2025-08-31    Informed consent obtained by covering floor team.  Procedure assisted by: ZEENAT Davis RN  Time out was preformed, confirming the patient's first and last name, date of birth, MR #, procedure, and correct site prior to start of procedure.    Patient was placed with HOB 30 degrees. Patient placement site was prepped with chlorhexidine solution, then draped using maximum sterile barrier protection. The area was injected with 2ml of 1% lidocaine. Using the Bard Site Rite 8, the catheter was placed using the Modified Seldinger Technique. Strict adherence to outline aseptic technique including handwashing, glove and gown, utilizing mask and cap, plus draping the patient with a sterile drape was observed. Upon completion of line placement, the insertion site was covered with a sterile CHG dressing. Pt tolerated procedure well.   V/S [id number] sat 97% on RA.  All materials used for catheter insertion, including the intact guide wires, were accounted for at the end of the procedure.  Number of attempts: 1  Complications/Comments: None    Emergency Placement: No  Site: New   Anatomical Site of insertion: L Brachial vein  Catheter size/length: 4F,   35cm  US guided Bard double lumen power picc placed    Post procedure verification with chest Xray as per orders.

## 2024-09-19 NOTE — PROGRESS NOTE ADULT - ATTENDING COMMENTS
80 y/o male with hx HFrEF (20%) dobutamine dependent, admitted with cardiogenic shock, deemed not a candidate for advanced heart failure therapies.  Pt with kidney failure as well.  Transferred to PCU on vaso/dobutamine with plan for titration of vaso to off if possible as goal is to home home for remainder of days.        In the setting of parenteral controlled substance administration, clinical monitoring required for side effects such as respiratory depression, constipation and opioid induced neurotoxicity due to organ failure.    Met with patient at bedside, somewhat confused, wants to see dentist and know what the plan is.  Reoriented to place and time.  Unable to titrate down vasopressin as of yet.  Bumex dc'd yesterday.  Patient slightly more alert.      Remains in cardiogenic shock on inotrops and pressors.       Patient assessment and plan discussed on interdisciplinary team rounds today.

## 2024-09-19 NOTE — PROGRESS NOTE ADULT - PROBLEM SELECTOR PLAN 2
- Continue Hydromorphone 0.2mg IV [id number] PRN for moderate pain and severe pain  Bowel regimen while on opioids.  Monitor for constipation.

## 2024-09-19 NOTE — PROGRESS NOTE ADULT - PROBLEM SELECTOR PLAN 5
- Patient required full supportive care with all ADLs   - PPSV 10%. Per patient's wife, the patient takes Prozac 20mg q3 days and mirtazapine 15mg qHS.  Has not been receiving these medications during this hospitalization.  Patient is likely out of window for serotonin withdrawal.  However, will not restart these medications because it will take several weeks to take effect and losing oral access may precipitate serotonin withdrawal symptoms.  - Lorazepam 0.5mg IV q1hour PRN for anxiety

## 2024-09-19 NOTE — ADVANCED PRACTICE NURSE CONSULT - ASSESSMENT
Patient encountered in PCU. When wound care RN arrived on unit, patient was found lying in a low air loss pressure redistribution support surface style bed. Patient was alert and oriented and gave consent to skin consult. Mr Becerra appears thin and frail. He reports that he is feeling cold, two blankets in place, a third warmed blanket retrieved and applied to patient. On dorsal aspect of penile shaft, there is a superficial wound measuring approximately 2cm x 2cm x 0.1cm. Yellow stringy slough noted on wound base, easily removed with gauze during assessment. Cavilon recommended. Indwelling gerardo catheter noted, diverting urine - condom catheter not recommended for use on this patient. Ecchymosis noted on right flank. Once consult was complete, patient was educated regarding the need for routine turning and positioning to prevent pressure injuries, he declines changing position at this time and states that he wishes "to rest."   Patient encountered in PCU. When wound care RN arrived on unit, patient was found lying in a low air loss pressure redistribution support surface style bed. Patient was alert and oriented and gave consent to skin consult. Mr Becerra appears thin and frail. He reports that he is feeling cold, two blankets in place, a third warmed blanket retrieved and applied to patient. On dorsal aspect of penile shaft, there is a superficial wound of unknown etiology measuring approximately 2cm x 2cm x 0.1cm. Yellow stringy slough noted on wound base, easily removed with gauze during assessment. Cavilon recommended. Indwelling gerardo catheter noted, diverting urine - condom catheter not recommended for use on this patient. Ecchymosis noted on right flank. Once consult was complete, patient was educated regarding the need for routine turning and positioning to prevent pressure injuries, he declines changing position at this time and states that he wishes "to rest."

## 2024-09-19 NOTE — PROGRESS NOTE ADULT - PROBLEM SELECTOR PLAN 6
Patient will remain in PCU for management of symptoms and disposition planning which will be guided by clinical course  - Will continue to provide supportive care to patient and family. - Patient required full supportive care with all ADLs   - PPSV 10%.

## 2024-09-19 NOTE — PROGRESS NOTE ADULT - PROBLEM SELECTOR PLAN 4
In part in setting of heart failure.  On 2L.    - Continue Hydromorphone 0.2mg IV [id number] PRN for dyspnea  - Continue Lorazepam 0.5mg IV q1hour PRN for dyspnea  - Bumetanide 2mg IV PRN for SOB/edema

## 2024-09-19 NOTE — PROGRESS NOTE ADULT - ASSESSMENT
79-year-old male with a history of HFrEF on home dobutamine 7.5 with last EF less than 20%, CAD, lungs mass - SCC, and urinary retention presented transferred from [facility] for management of heart failure after having syncopal episode, unable to wean off pressors and inotropes.  Patient has since transitioned to comfort care and transferred to PCU.  Will unlikely be able to wean off vasopressin

## 2024-09-19 NOTE — PROGRESS NOTE ADULT - SUBJECTIVE AND OBJECTIVE BOX
GAP TEAM PALLIATIVE CARE UNIT PROGRESS NOTE:      [  ] Patient on hospice program.    HPI:  79-year-old male with a history of HFrEF on home dobutamine 7.5 with last EF less than 20%, CAD, lungs mass - SCC, and urinary retention presented transferred from [facility] for management of heart failure after having syncopal episode last night.  Patient also having reduced urine output despite 6 mg of Bumex twice a day.  At [facility] patient had a workup which was notable for sodium of 124, creatinine to 4.6 from his baseline of approximately 2-2.1, proBNP 5220, lactate 1.9.  He was transferred here for further evaluation and  heart failure management, admitted to CICU.    On admission to CICU he is A&O, saturating well on room air, sinus rhythm 75 /59. He denies dizziness, chest pain, sob, N&V on admission    INDICATION FOR PALLIATIVE CARE UNIT SERVICES/Interval HPI:  Patient unable to be weaned off vasopressors and not responding to diuretics.  After discussions with wife and patient, patient transitioned to comfort care.  Patient requires inpatient services due to IV requirements of dobutamine gtt and vasopressin.    DNR on chart: YES  DNR  DNI        Allergies    No Known Allergies    Intolerances    atorvastatin (Muscle Pain (Severe))  MEDICATIONS  (STANDING):  chlorhexidine 2% Cloths 1 Application(s) Topical daily  DOBUTamine Infusion 7.504 MICROgram(s)/kG/Min (8.6 mL/Hr) IV Continuous <Continuous>  melatonin 5 milliGRAM(s) Oral at bedtime  midodrine 10 milliGRAM(s) Oral every 8 hours  tamsulosin 0.4 milliGRAM(s) Oral at bedtime  vasopressin Infusion 0.04 Unit(s)/Min (6 mL/Hr) IV Continuous <Continuous>    MEDICATIONS  (PRN):  acetaminophen  Suppository .. 650 milliGRAM(s) Rectal every 6 hours PRN Temp greater or equal to 38C (100.4F), Mild Pain (1 - 3)  bisacodyl Suppository 10 milliGRAM(s) Rectal daily PRN Constipation  glycopyrrolate Injectable 0.4 milliGRAM(s) IV Push every 6 hours PRN Secretions  HYDROmorphone  Injectable 0.2 milliGRAM(s) IV Push every 1 hour PRN moderate to severe pain  HYDROmorphone  Injectable 0.2 milliGRAM(s) IV Push every 1 hour PRN Dyspnea  lidocaine 2% Gel 1 Application(s) Topical four times a day PRN Discomfort  LORazepam   Injectable 0.5 milliGRAM(s) IV Push every 1 hour PRN Anxiety    ITEMS UNCHECKED ARE NOT PRESENT    PRESENT SYMPTOMS: [ ]Unable to self-report see PAINAD, RDOS below  Source if other than patient:  [ ]Family   [ ]Team     Pain: [ ] yes [X] no  QOL impact -   Location -                    Aggravating factors -  Quality -  Radiation -  Timing-  Severity (0-10 scale):  Minimal acceptable level (0-10 scale):       PCSSQ [Palliative Care Spiritual Screening Question]   Severity (0-10):  Score of 4 or > indicate consideration of Chaplaincy referral.  Chaplaincy Referral: [ ] yes [ ] refused [ ] following [ ] deferred    Caregiver [place]? : [X] yes [ ] no [ ] deferred:  Social work referral [X] Patient & Family Centered Care Referral [ ]   Anticipatory Grief present?:  [X] yes [ ] no [ ] deferred:  Social work referral [X] Patient & Family Centered Care Referral [ ]  	  Other Symptoms:  [X]All other review of systems negative [ ] Patient unable to give ROS    PHYSICAL EXAM:   Vital Signs Last 24 Hrs  T(C): 36.7 (19 Sep 2024 08:45), Max: 36.7 (19 Sep 2024 08:45)  T(F): 98.1 (19 Sep 2024 08:45), Max: 98.1 (19 Sep 2024 08:45)  HR: 94 (19 Sep 2024 08:45) (94 - 94)  BP: 91/58 (19 Sep 2024 08:45) (91/58 - 91/58)  BP(mean): --  RR: 18 (19 Sep 2024 08:45) (18 - 18)  SpO2: 99% (19 Sep 2024 08:45) (99% - 99%)    Parameters below as of 19 Sep 2024 08:45  Patient On (Oxygen Delivery Method): nasal cannula     I&O's Summary    18 Sep 2024 07:01  -  19 Sep 2024 07:00  --------------------------------------------------------  IN: 0 mL / OUT: 1150 mL / NET: -1150 mL      GENERAL: [X] Cachexia  [X]Alert  [X]Oriented x 3  [ ]Lethargic  [ ]Unarousable  [X]Verbal  [ ]Non-Verbal   Behavioral:   [ ] Anxiety  [ ] Delirium [ ] Agitation [ ] Other: Appears comfortable  HEENT:  [X]Normal   [ ]Dry mouth   [ ]ET Tube/Trach  [ ]Oral lesions  PULMONARY: [X] on 2L, saturating well  [X]Clear [ ]Tachypnea  [ ]Audible excessive secretions   [ ]Rhonchi        [ ]Right [ ]Left [ ]Bilateral  [ ]Crackles        [ ]Right [ ]Left [ ]Bilateral  [ ]Wheezing     [ ]Right [ ]Left [ ]Bilateral  [ ]Diminished BS [ ]Right [ ]Left [ ]Bilateral    CARDIOVASCULAR:    [ ]Regular [X]Irregular [ ]Tachy  [ ]Christiano [ ]Murmur [X]Other:  S3  GASTROINTESTINAL:  [X]Soft  [ ]Distended   [X]+BS  [X]Non tender [ ]Tender  [ ]Other [ ]PEG [ ]OGT/ NGT   Last BM:  9/15  GENITOURINARY:  [ ]Normal [ ] Incontinent   [ ]Oliguria/Anuria   [X]Chandler  MUSCULOSKELETAL:   [ ]Normal   [ ]Weakness  [X]Bed/Wheelchair bound [ ]Edema  NEUROLOGIC:   [X]No focal deficits  [ ] Cognitive impairment  [ ] Dysphagia [ ]Dysarthria [ ] Paresis   SKIN:   [X]Normal  [ ]Rash  [ ]Other  [ ]Pressure ulcer(s)  [ ]y [X]n  Present on admission      CRITICAL CARE:  [X] Shock Present  [ ]Septic [X]Cardiogenic [ ]Neurologic [ ]Hypovolemic  [X]  Vasopressors [X]  Inotropes   [ ] Respiratory failure present [ ] Mechanical Ventilation [ ] Non-invasive ventilatory support [ ] High-Flow    [ ] Acute  [ ] Chronic [ ] Hypoxic  [ ] Hypercarbic [ ] Other  [X] Other organ failure: Renal Failure        LABS: No new labs    RADIOLOGY & ADDITIONAL STUDIES: No new radiology    PROTEIN CALORIE MALNUTRITION: [ ] mild [ ] moderate [X] severe  [X] underweight [ ] morbid obesity    https://www.andeal.org/vault/6866/web/files/ONC/Table_Clinical%20Characteristics%20to%20Document%20Malnutrition-White%20JV%20et%20al%202012.pdf        [X] PPSV2 < or = 30% [ ] significant weight loss [X] poor nutritional intake [ ] anasarca   Artificial Nutrition [ ]     Other REFERRALS:    [ ] Hospice  [ ]Child Life  [X]Social Work  [ ]Case management [ ]Holistic Therapy [ ] Physical Therapy [ ] Dietary       Palliative Performance Scale:  http://npcrc.org/files/news/palliative_performance_scale_ppsv2.pdf  (Ctrl +  left click to view)  Respiratory Distress Observation Tool:  https://homecareinformation.net/handouts/hen/Respiratory_Distress_Observation_Scale.pdf (Ctrl +  left click to view)  PAINAD Score:  http://geriatrictoolkit.[place].[facility]/cog/painad.pdf (Ctrl +  left click to view)   GAP TEAM PALLIATIVE CARE UNIT PROGRESS NOTE:      [  ] Patient on hospice program.    HPI:  79-year-old male with a history of HFrEF on home dobutamine 7.5 with last EF less than 20%, CAD, lungs mass - SCC, and urinary retention presented transferred from [facility] for management of heart failure after having syncopal episode last night.  Patient also having reduced urine output despite 6 mg of Bumex twice a day.  At [facility] patient had a workup which was notable for sodium of 124, creatinine to 4.6 from his baseline of approximately 2-2.1, proBNP 5220, lactate 1.9.  He was transferred here for further evaluation and  heart failure management, admitted to CICU.    On admission to CICU he is A&O, saturating well on room air, sinus rhythm 75 /59. He denies dizziness, chest pain, sob, N&V on admission    INDICATION FOR PALLIATIVE CARE UNIT SERVICES/Interval HPI:  Patient unable to be weaned off vasopressors and not responding to diuretics.  After discussions with wife and patient, patient transitioned to comfort care.  Patient requires inpatient services due to IV requirements of dobutamine gtt and vasopressin.    Patient did not receive 10mg midodrine or tamsulosin last night.  Patient much more alert this AM, eating breakfast although with some coughing.  Slow to respond but coherent.  Asking questions about his home medications.    DNR on chart: YES  DNI        Allergies    No Known Allergies    Intolerances    atorvastatin (Muscle Pain (Severe))  MEDICATIONS  (STANDING):  chlorhexidine 2% Cloths 1 Application(s) Topical daily  DOBUTamine Infusion 7.504 MICROgram(s)/kG/Min (8.6 mL/Hr) IV Continuous <Continuous>  melatonin 5 milliGRAM(s) Oral at bedtime  midodrine 10 milliGRAM(s) Oral every 8 hours  tamsulosin 0.4 milliGRAM(s) Oral at bedtime  vasopressin Infusion 0.04 Unit(s)/Min (6 mL/Hr) IV Continuous <Continuous>    MEDICATIONS  (PRN):  acetaminophen  Suppository .. 650 milliGRAM(s) Rectal every 6 hours PRN Temp greater or equal to 38C (100.4F), Mild Pain (1 - 3)  bisacodyl Suppository 10 milliGRAM(s) Rectal daily PRN Constipation  glycopyrrolate Injectable 0.4 milliGRAM(s) IV Push every 6 hours PRN Secretions  HYDROmorphone  Injectable 0.2 milliGRAM(s) IV Push every 1 hour PRN moderate to severe pain  HYDROmorphone  Injectable 0.2 milliGRAM(s) IV Push every 1 hour PRN Dyspnea  lidocaine 2% Gel 1 Application(s) Topical four times a day PRN Discomfort  LORazepam   Injectable 0.5 milliGRAM(s) IV Push every 1 hour PRN Anxiety    ITEMS UNCHECKED ARE NOT PRESENT    PRESENT SYMPTOMS: [ ]Unable to self-report see PAINAD, RDOS below  Source if other than patient:  [ ]Family   [ ]Team     Pain: [ ] yes [X] no  QOL impact -   Location -                    Aggravating factors -  Quality -  Radiation -  Timing-  Severity (0-10 scale):  Minimal acceptable level (0-10 scale):       PCSSQ [Palliative Care Spiritual Screening Question]   Severity (0-10):  Score of 4 or > indicate consideration of Chaplaincy referral.  Chaplaincy Referral: [X] yes [ ] refused [X] following [ ] deferred    Caregiver [place]? : [X] yes [ ] no [ ] deferred:  Social work referral [X] Patient & Family Centered Care Referral [ ]   Anticipatory Grief present?:  [X] yes [ ] no [ ] deferred:  Social work referral [X] Patient & Family Centered Care Referral [ ]  	  Other Symptoms:  [X]All other review of systems negative [ ] Patient unable to give ROS    PHYSICAL EXAM:   Vital Signs Last 24 Hrs  T(C): 36.7 (19 Sep 2024 08:45), Max: 36.7 (19 Sep 2024 08:45)  T(F): 98.1 (19 Sep 2024 08:45), Max: 98.1 (19 Sep 2024 08:45)  HR: 94 (19 Sep 2024 08:45) (94 - 94)  BP: 91/58 (19 Sep 2024 08:45) (91/58 - 91/58)  BP(mean): --  RR: 18 (19 Sep 2024 08:45) (18 - 18)  SpO2: 99% (19 Sep 2024 08:45) (99% - 99%)    Parameters below as of 19 Sep 2024 08:45  Patient On (Oxygen Delivery Method): nasal cannula     I&O's Summary    18 Sep 2024 07:01  -  19 Sep 2024 07:00  --------------------------------------------------------  IN: 0 mL / OUT: 1150 mL / NET: -1150 mL      GENERAL: [X] Cachexia  [X]Alert  [X]Oriented x 3  [ ]Lethargic  [ ]Unarousable  [X]Verbal  [ ]Non-Verbal   Behavioral:   [ ] Anxiety  [ ] Delirium [ ] Agitation [ ] Other: Appears comfortable  HEENT:  [X]Normal   [ ]Dry mouth   [ ]ET Tube/Trach  [ ]Oral lesions  PULMONARY: [X] on 2L, saturating well  [X]Clear [ ]Tachypnea  [ ]Audible excessive secretions   [ ]Rhonchi        [ ]Right [ ]Left [ ]Bilateral  [ ]Crackles        [ ]Right [ ]Left [ ]Bilateral  [ ]Wheezing     [ ]Right [ ]Left [ ]Bilateral  [ ]Diminished BS [ ]Right [ ]Left [ ]Bilateral    CARDIOVASCULAR:    [ ]Regular [X]Irregular [ ]Tachy  [ ]Christiano [ ]Murmur [X]Other:  S3  GASTROINTESTINAL:  [X]Soft  [ ]Distended   [X]+BS  [X]Non tender [ ]Tender  [ ]Other [ ]PEG [ ]OGT/ NGT   Last BM:  9/15  GENITOURINARY:  [ ]Normal [ ] Incontinent   [ ]Oliguria/Anuria   [X]Chandler  MUSCULOSKELETAL:   [ ]Normal   [ ]Weakness  [X]Bed/Wheelchair bound [ ]Edema  NEUROLOGIC:   [X]No focal deficits  [ ] Cognitive impairment  [ ] Dysphagia [ ]Dysarthria [ ] Paresis   SKIN:   [X]Normal  [ ]Rash  [ ]Other  [ ]Pressure ulcer(s)  [ ]y [X]n  Present on admission      CRITICAL CARE:  [X] Shock Present  [ ]Septic [X]Cardiogenic [ ]Neurologic [ ]Hypovolemic  [X]  Vasopressors [X]  Inotropes   [ ] Respiratory failure present [ ] Mechanical Ventilation [ ] Non-invasive ventilatory support [ ] High-Flow    [ ] Acute  [ ] Chronic [ ] Hypoxic  [ ] Hypercarbic [ ] Other  [X] Other organ failure: Renal Failure        LABS: No new labs    RADIOLOGY & ADDITIONAL STUDIES: No new radiology    PROTEIN CALORIE MALNUTRITION: [ ] mild [ ] moderate [X] severe  [X] underweight [ ] morbid obesity    https://www.andeal.org/vault/2440/web/files/ONC/Table_Clinical%20Characteristics%20to%20Document%20Malnutrition-White%20JV%20et%20al%202012.pdf        [X] PPSV2 < or = 30% [ ] significant weight loss [X] poor nutritional intake [ ] anasarca   Artificial Nutrition [ ]     Other REFERRALS:    [ ] Hospice  [ ]Child Life  [X]Social Work  [ ]Case management [ ]Holistic Therapy [ ] Physical Therapy [ ] Dietary       Palliative Performance Scale:  http://npcrc.org/files/news/palliative_performance_scale_ppsv2.pdf  (Ctrl +  left click to view)  Respiratory Distress Observation Tool:  https://homecareinformation.net/handouts/hen/Respiratory_Distress_Observation_Scale.pdf (Ctrl +  left click to view)  PAINAD Score:  http://geriatrictoolkit.[place].[facility]/cog/painad.pdf (Ctrl +  left click to view)

## 2024-09-19 NOTE — ADVANCED PRACTICE NURSE CONSULT - REASON FOR CONSULT
Vascular Access Team    Evaluation for: Bedside DL PICC placement  Requested by name: Justo Aguayo (18-Sep-2024 13:45)    Indication for PICC: Comfort care needs inotrope gtt to go home. Currently on dobutamine and vasopressin (attempting to wean vasopressin)    Allergies: NKDA, intolerance to atorvastatin   Platelets(>20): 69   INR(<3): 1.33  eGFR(>40): 13  Blood cultures sent: 9/7/2024 16:30   Blood culture negative in 48hrs: Yes  Anticoagulants:   Arms DVT: No  Mastectomy: No  Fistula: No  PPM/Defib: No  IR or Nephrology or ID clearance needed- No patient is for comfort care at home.     Consent obtained: Pending     Pending: Consent     Plan: Bedside picc order evaluated. Please obtain PICC placement consent and then call [phone] for the VAT RN to place the bedside picc.  
Wound care consult initiated by RN to assess patient's skin for a possible penile skin tear     Reason for Admission: cardiogenic shock  History of Present Illness:   79-year-old male with a history of HFrEF on home dobutamine 7.5 with last EF less than 20%, CAD, lungs mass - SCC, and urinary retention presented transferred from [facility] for management of heart failure after having syncopal episode last night.  Patient also having reduced urine output despite 6 mg of Bumex twice a day.  At [facility] patient had a workup which was notable for sodium of 124, creatinine to 4.6 from his baseline of approximately 2-2.1, proBNP 5220, lactate 1.9.  He was transferred here for further evaluation and  heart failure management, admitted to CICU.    On admission to CICU he is A&O, saturating well on room air, sinus rhythm 75 /59. He denies dizziness, chest pain, sob, N&V on admission.    
Bedside DL picc order placed.   Indication: DL picc placement for long term antibiotic and access

## 2024-09-19 NOTE — PROGRESS NOTE ADULT - PROBLEM SELECTOR PLAN 1
Patient has HFrEF (EF 20%) on home dobutamine 7.5 presenting with worsening heart failure requiring increased doses of diuretics.  Requiring dobutamine and vasopressin.  Will attempt to wean vasopressin so that patient could go home; however, patient's blood pressure currently would not be able to tolerate it.  Will not increase midodrine in setting of low heart rate, increasing afterload in heart failure, and likely impending loss of oral access.  Patient should get PICC line if wife is amenable, currently has Shiley  - Continue home dobutamine 7.5 gtt  - Continue vasopressin 0.04 gtt, wean as tolerated  - Continue midodrine 10mg PO q8hour as patient tolerates  - Discontinue Bumetanide 2mg IV TID - has been responding to it but is currently not overtly hypervolemic and is on 2L of oxygen  - Bumetanide 2mg IV PRN for SOB/edema  - Continue 2L oxygen for comfort  - Place PICC line and remove Shiley if wife is amenable Patient has HFrEF (EF 20%) on home dobutamine 7.5 presenting with worsening heart failure requiring increased doses of diuretics.  Requiring dobutamine and vasopressin.  Will attempt to wean vasopressin so that patient could go home; however, patient's blood pressure currently would not be able to tolerate it.  Will not increase midodrine in setting of low heart rate, increasing afterload in heart failure, and likely impending loss of oral access.  Plan for PICC placement 9/19 AM  - Continue home dobutamine 7.5 gtt  - Continue vasopressin 0.04 gtt, wean as tolerated  - Continue midodrine 10mg PO q8hour as patient tolerates  - Continue to hold Bumetanide 2mg IV TID - has been responding to it but is currently not overtly hypervolemic and is on 2L of oxygen  - Discontinue tamsulosin as patient has a Chandler and it may reduce blood pressure  - Bumetanide 2mg IV PRN for SOB/edema  - Continue 2L oxygen for comfort  - Place PICC line and remove Shiley

## 2024-09-19 NOTE — ADVANCED PRACTICE NURSE CONSULT - RECOMMEDATIONS
Post procedure verbal instructions given to the patient or patient representative. Patient or patient representative  instructed regarding signs and symptoms of infection. Patient instructed to keep dressing dry and clean. Care for catheter as per hospital protocols  
Impression:     wound/abrasion on dorsal aspect of penile shaft    Recommendations:     1) turn and position q2 and PRN utilizing offloading assistive devices  2) routine pericare daily and PRN soiling  3) encourage optimal nutrition  4) waffle cushion when oob to chair  5) B/L LE complete cair air fluidized boots or marion-lock pillow to offload heels/feet  6) donna protective barrier cream to B/L buttocks/sacrum daily and PRN soiling   7) incontinence management - consider external urinary catheter to divert urine from skin if incontinent  8) cleanse skin and pat dry then apply cavilon to penile abrasion daily (if any yellow, stringy slough is noted please place new consult)      Plan discussed with BETTY Serna on unit     For questions/comments regarding the recommendations in this consult, please contact Rae at [phone]. Wound care will not actively follow, please place future consults for new concerns. Thank you!

## 2024-09-19 NOTE — PROGRESS NOTE ADULT - PROBLEM SELECTOR PLAN 3
Overnight 9/17 patient became agitated (likely due to placing Chandler) and there was a concern that he may remove his Shiley IV access, thereby disconnecting himself from dobutamine  - Continue lorazepam 0.5mg IV q1hour PRN for agitation/anxiety

## 2024-09-20 NOTE — PROGRESS NOTE ADULT - PROBLEM SELECTOR PLAN 1
Patient has HFrEF (EF 20%) on home dobutamine 7.5 presenting with worsening heart failure requiring increased doses of diuretics.  Requiring dobutamine and vasopressin.  Will attempt to wean vasopressin so that patient could go home; however, patient's blood pressure currently would not be able to tolerate it.  Will not increase midodrine in setting of low heart rate, increasing afterload in heart failure, and likely impending loss of oral access.  Plan for PICC placement 9/19 AM  - Continue home dobutamine 7.5 gtt  - Continue vasopressin 0.04 gtt, wean as tolerated  - Continue midodrine 10mg PO q8hour as patient tolerates  - Continue to hold Bumetanide 2mg IV TID   - Bumetanide 2mg IV PRN for SOB/edema  - Continue 2L oxygen for comfort  - PICC line in place, hold off on removing Shiley due to patient's health acuity

## 2024-09-20 NOTE — PROGRESS NOTE ADULT - PROBLEM SELECTOR PLAN 2
- Dilaudid 0.2 mg q1hr PRN for Moderate Pain   - Dilaudid 0.5 mg q1hr PRN for Severe Pain   Bowel regimen while on opioids.  Monitor for constipation.  Patient's wife in agreement with PRN medications to keep patient comfortable

## 2024-09-20 NOTE — BH CONSULTATION LIAISON ASSESSMENT NOTE - NSCURPASTPSYDX_PSY_ALL_CORE
[FreeTextEntry3] :     Saw and examined patient; the above is an accurate documentation of my words and actions.   Lori Salcedo MD [facility] Pediatric Orthopedic Surgery    Mood disorder

## 2024-09-20 NOTE — PROGRESS NOTE ADULT - PROBLEM SELECTOR PLAN 4
In part in setting of heart failure.  On 2L.    - Dilaudid 0.5 mg q1hr PRN for Dyspnea   - Bumetanide 2mg IV PRN for SOB/edema

## 2024-09-20 NOTE — PROGRESS NOTE ADULT - PROBLEM SELECTOR PLAN 5
Per patient's wife, the patient takes Prozac 20mg q3 days and mirtazapine 15mg qHS.  Has not been receiving these medications during this hospitalization.  Patient is likely out of window for serotonin withdrawal.  However, will not restart these medications because it will take several weeks to take effect and losing oral access may precipitate serotonin withdrawal symptoms.  - Lorazepam 0.5mg IV q1hour PRN for anxiety

## 2024-09-20 NOTE — PROGRESS NOTE ADULT - PROBLEM SELECTOR PLAN 3
Increasing anxiety/agitation  - Lorazepam 1.0mg IV q1hour PRN for agitation/anxiety  - Lorazepam 0.5mg IV q6hr ATC

## 2024-09-20 NOTE — PROGRESS NOTE ADULT - ASSESSMENT
79-year-old male with a history of HFrEF on home dobutamine 7.5 with last EF less than 20%, CAD, lungs mass - SCC, and urinary retention presented transferred from [facility] for management of heart failure after having syncopal episode, unable to wean off pressors and inotropes.  Patient has since transitioned to comfort care and transferred to PCU.  Will unlikely be able to wean off vasopressin

## 2024-09-20 NOTE — PROGRESS NOTE ADULT - ATTENDING COMMENTS
80 y/o male with hx HFrEF (20%) dobutamine dependent, admitted with cardiogenic shock, deemed not a candidate for advanced heart failure therapies.  Pt with kidney failure as well.  Transferred to PCU on vaso/dobutamine with plan for titration of vaso to off if possible as goal is to home home for remainder of days.        In the setting of parenteral controlled substance administration, clinical monitoring required for side effects such as respiratory depression, constipation and opioid induced neurotoxicity due to organ failure.    Met with wife at bedside who understands that the dying process is underway.  Oral route lost today and not taking PO any more therefore midodrine discontinued.  To continue dobutamine/vaso capped.  I feel that engaging the wife in a discussion to discontinue would cause undo suffering at this time.  Will reassess daily.    Remains in cardiogenic shock on inotrops and pressors.       Patient assessment and plan discussed on interdisciplinary team rounds today.

## 2024-09-20 NOTE — PROGRESS NOTE ADULT - SUBJECTIVE AND OBJECTIVE BOX
GAP TEAM PALLIATIVE CARE UNIT PROGRESS NOTE:      [  ] Patient on hospice program.    HPI:  79-year-old male with a history of HFrEF on home dobutamine 7.5 with last EF less than 20%, CAD, lungs mass - SCC, and urinary retention presented transferred from [facility] for management of heart failure after having syncopal episode last night.  Patient also having reduced urine output despite 6 mg of Bumex twice a day.  At [facility] patient had a workup which was notable for sodium of 124, creatinine to 4.6 from his baseline of approximately 2-2.1, proBNP 5220, lactate 1.9.  He was transferred here for further evaluation and  heart failure management, admitted to CICU. Patient unable to be weaned off vasopressors and not responding to diuretics.  After discussions with wife and patient, patient transitioned to comfort care.     INDICATION FOR PALLIATIVE CARE UNIT SERVICES/Interval HPI:  Patient seen and examined at bedside, accompanied by wife. Patient is unarousable.     DNR on chart: YES  DNI        Allergies    No Known Allergies    Intolerances    atorvastatin (Muscle Pain (Severe))    MEDICATIONS  (STANDING):  chlorhexidine 2% Cloths 1 Application(s) Topical daily  DOBUTamine Infusion 7.504 MICROgram(s)/kG/Min (8.6 mL/Hr) IV Continuous <Continuous>  LORazepam   Injectable 0.5 milliGRAM(s) IV Push every 6 hours  melatonin 5 milliGRAM(s) Oral at bedtime  midodrine 10 milliGRAM(s) Oral every 8 hours  vasopressin Infusion 0.04 Unit(s)/Min (6 mL/Hr) IV Continuous <Continuous>    MEDICATIONS  (PRN):  acetaminophen  Suppository .. 650 milliGRAM(s) Rectal every 6 hours PRN Temp greater or equal to 38C (100.4F), Mild Pain (1 - 3)  bisacodyl Suppository 10 milliGRAM(s) Rectal daily PRN Constipation  glycopyrrolate Injectable 0.4 milliGRAM(s) IV Push every 6 hours PRN Secretions  HYDROmorphone  Injectable 0.5 milliGRAM(s) IV Push every 1 hour PRN Dyspnea  HYDROmorphone  Injectable 0.2 milliGRAM(s) IV Push every 1 hour PRN Moderate Pain (4 - 6)  HYDROmorphone  Injectable 0.5 milliGRAM(s) IV Push every 1 hour PRN Severe Pain (7 - 10)  lidocaine 2% Gel 1 Application(s) Topical four times a day PRN Discomfort  LORazepam   Injectable 1 milliGRAM(s) IV Push every 1 hour PRN Anxiety/Agitation    ITEMS UNCHECKED ARE NOT PRESENT    PRESENT SYMPTOMS: [X]Unable to self-report see PAINAD, RDOS below  Source if other than patient:  [ ]Family   [ ]Team     Pain: [ ] yes [ ] no  QOL impact -   Location -                    Aggravating factors -  Quality -  Radiation -  Timing-  Severity (0-10 scale):  Minimal acceptable level (0-10 scale):       PCSSQ [Palliative Care Spiritual Screening Question]   Severity (0-10):  Score of 4 or > indicate consideration of Chaplaincy referral.  Chaplaincy Referral: [X] yes [ ] refused [X] following [ ] deferred    Caregiver [place]? : [X] yes [ ] no [ ] deferred:  Social work referral [X] Patient & Family Centered Care Referral [ ]   Anticipatory Grief present?:  [X] yes [ ] no [ ] deferred:  Social work referral [X] Patient & Family Centered Care Referral [ ]  	  Other Symptoms:  [X]All other review of systems negative [ ] Patient unable to give ROS    PHYSICAL EXAM:     Vital Signs Last 24 Hrs  T(C): 36.2 (20 Sep 2024 08:52), Max: 36.6 (19 Sep 2024 14:41)  T(F): 97.1 (20 Sep 2024 08:52), Max: 97.9 (19 Sep 2024 14:41)  HR: 79 (20 Sep 2024 08:52) (68 - 79)  BP: 72/40 (20 Sep 2024 08:52) (72/40 - 92/51)  BP(mean): --  RR: 18 (20 Sep 2024 08:52) (18 - 18)  SpO2: 98% (20 Sep 2024 08:52) (97% - 98%)    Parameters below as of 20 Sep 2024 08:52  Patient On (Oxygen Delivery Method): nasal cannula    I&O's Summary    19 Sep 2024 07:01  -  20 Sep 2024 07:00  --------------------------------------------------------  IN: 0 mL / OUT: 900 mL / NET: -900 mL    GENERAL: [X] Cachexia  [ ]Alert  [ ]Oriented  [X]Lethargic  [X]Unarousable  [ ]Verbal  [ ]Non-Verbal   Behavioral:   [ ] Anxiety  [ ] Delirium [ ] Agitation [ ] Other: Appears comfortable  HEENT:  [ ]Normal   [X]Dry mouth   [ ]ET Tube/Trach  [ ]Oral lesions  PULMONARY:   [X]Clear [ ]Tachypnea  [ ]Audible excessive secretions   [ ]Rhonchi        [ ]Right [ ]Left [ ]Bilateral  [ ]Crackles        [ ]Right [ ]Left [ ]Bilateral  [ ]Wheezing     [ ]Right [ ]Left [ ]Bilateral  [ ]Diminished BS [ ]Right [ ]Left [ ]Bilateral    CARDIOVASCULAR:    [ ]Regular [X]Irregular [ ]Tachy  [ ]Christiano [ ]Murmur [X]Other:  S3  GASTROINTESTINAL:  [X]Soft  [ ]Distended   [X]+BS  [X]Non tender [ ]Tender  [ ]Other [ ]PEG [ ]OGT/ NGT   Last BM: Uknown  GENITOURINARY:  [ ]Normal [ ] Incontinent   [ ]Oliguria/Anuria   [X]Chandler  MUSCULOSKELETAL:   [ ]Normal   [ ]Weakness  [X]Bed/Wheelchair bound [ ]Edema  NEUROLOGIC:   [X]No focal deficits  [ ] Cognitive impairment  [ ] Dysphagia [ ]Dysarthria [ ] Paresis   SKIN:   [ ]Normal  [ ]Rash  [X]Other Please seen RN Documentation which I have reviewed   [ ]Pressure ulcer(s)  [ ]y [X]n  Present on admission      CRITICAL CARE:  [X] Shock Present  [ ]Septic [X]Cardiogenic [ ]Neurologic [ ]Hypovolemic  [X]  Vasopressors [X]  Inotropes   [ ] Respiratory failure present [ ] Mechanical Ventilation [ ] Non-invasive ventilatory support [ ] High-Flow    [ ] Acute  [ ] Chronic [ ] Hypoxic  [ ] Hypercarbic [ ] Other  [X] Other organ failure: Renal Failure    LABS: No new labs    RADIOLOGY & ADDITIONAL STUDIES: No new radiology    PROTEIN CALORIE MALNUTRITION: [ ] mild [ ] moderate [X] severe  [X] underweight [ ] morbid obesity    https://www.andeal.org/vault/9242/web/files/ONC/Table_Clinical%20Characteristics%20to%20Document%20Malnutrition-White%20JV%20et%20al%202012.pdf        [X] PPSV2 < or = 30% [ ] significant weight loss [X] poor nutritional intake [ ] anasarca   Artificial Nutrition [ ]     Other REFERRALS:    [ ] Hospice  [ ]Child Life  [X]Social Work  [ ]Case management [ ]Holistic Therapy [ ] Physical Therapy [ ] Dietary       Palliative Performance Scale:  http://[facility].org/files/news/palliative_performance_scale_ppsv2.pdf  (Ctrl +  left click to view)  Respiratory Distress Observation Tool:  https://homecareinformation.net/handouts/hen/Respiratory_Distress_Observation_Scale.pdf (Ctrl +  left click to view)  PAINAD Score:  http://geriatrictoolkit.[place].[facility]/cog/painad.pdf (Ctrl +  left click to view)   GAP TEAM PALLIATIVE CARE UNIT PROGRESS NOTE:      [  ] Patient on hospice program.    HPI:  79-year-old male with a history of HFrEF on home dobutamine 7.5 with last EF less than 20%, CAD, lungs mass - SCC, and urinary retention presented transferred from [facility] for management of heart failure after having syncopal episode last night.  Patient also having reduced urine output despite 6 mg of Bumex twice a day.  At [facility] patient had a workup which was notable for sodium of 124, creatinine to 4.6 from his baseline of approximately 2-2.1, proBNP 5220, lactate 1.9.  He was transferred here for further evaluation and  heart failure management, admitted to CICU. Patient unable to be weaned off vasopressors and not responding to diuretics.  After discussions with wife and patient, patient transitioned to comfort care.     INDICATION FOR PALLIATIVE CARE UNIT SERVICES/Interval HPI:  Patient seen and examined at bedside, accompanied by wife. Patient is unarousable.     DNR on chart: YES  DNI    Allergies    No Known Allergies    Intolerances    atorvastatin (Muscle Pain (Severe))    MEDICATIONS  (STANDING):  chlorhexidine 2% Cloths 1 Application(s) Topical daily  DOBUTamine Infusion 7.504 MICROgram(s)/kG/Min (8.6 mL/Hr) IV Continuous <Continuous>  LORazepam   Injectable 0.5 milliGRAM(s) IV Push every 6 hours  melatonin 5 milliGRAM(s) Oral at bedtime  midodrine 10 milliGRAM(s) Oral every 8 hours  vasopressin Infusion 0.04 Unit(s)/Min (6 mL/Hr) IV Continuous <Continuous>    MEDICATIONS  (PRN):  acetaminophen  Suppository .. 650 milliGRAM(s) Rectal every 6 hours PRN Temp greater or equal to 38C (100.4F), Mild Pain (1 - 3)  bisacodyl Suppository 10 milliGRAM(s) Rectal daily PRN Constipation  glycopyrrolate Injectable 0.4 milliGRAM(s) IV Push every 6 hours PRN Secretions  HYDROmorphone  Injectable 0.5 milliGRAM(s) IV Push every 1 hour PRN Dyspnea  HYDROmorphone  Injectable 0.2 milliGRAM(s) IV Push every 1 hour PRN Moderate Pain (4 - 6)  HYDROmorphone  Injectable 0.5 milliGRAM(s) IV Push every 1 hour PRN Severe Pain (7 - 10)  lidocaine 2% Gel 1 Application(s) Topical four times a day PRN Discomfort  LORazepam   Injectable 1 milliGRAM(s) IV Push every 1 hour PRN Anxiety/Agitation    ITEMS UNCHECKED ARE NOT PRESENT    PRESENT SYMPTOMS: [X]Unable to self-report see PAINAD, RDOS below  Source if other than patient:  [ ]Family   [ ]Team     Pain: [ ] yes [ ] no  QOL impact -   Location -                    Aggravating factors -  Quality -  Radiation -  Timing-  Severity (0-10 scale):  Minimal acceptable level (0-10 scale):       PCSSQ [Palliative Care Spiritual Screening Question]   Severity (0-10):  Score of 4 or > indicate consideration of Chaplaincy referral.  Chaplaincy Referral: [X] yes [ ] refused [X] following [ ] deferred    Caregiver [place]? : [X] yes [ ] no [ ] deferred:  Social work referral [X] Patient & Family Centered Care Referral [ ]   Anticipatory Grief present?:  [X] yes [ ] no [ ] deferred:  Social work referral [X] Patient & Family Centered Care Referral [ ]  	  Other Symptoms:  [X]All other review of systems negative [ ] Patient unable to give ROS    PHYSICAL EXAM:     Vital Signs Last 24 Hrs  T(C): 36.2 (20 Sep 2024 08:52), Max: 36.6 (19 Sep 2024 14:41)  T(F): 97.1 (20 Sep 2024 08:52), Max: 97.9 (19 Sep 2024 14:41)  HR: 79 (20 Sep 2024 08:52) (68 - 79)  BP: 72/40 (20 Sep 2024 08:52) (72/40 - 92/51)  BP(mean): --  RR: 18 (20 Sep 2024 08:52) (18 - 18)  SpO2: 98% (20 Sep 2024 08:52) (97% - 98%)    Parameters below as of 20 Sep 2024 08:52  Patient On (Oxygen Delivery Method): nasal cannula    I&O's Summary    19 Sep 2024 07:01  -  20 Sep 2024 07:00  --------------------------------------------------------  IN: 0 mL / OUT: 900 mL / NET: -900 mL    GENERAL: [X] Cachexia  [ ]Alert  [ ]Oriented  [X]Lethargic  [X]Unarousable  [ ]Verbal  [ ]Non-Verbal   Behavioral:   [ ] Anxiety  [ ] Delirium [ ] Agitation [ ] Other: Appears comfortable  HEENT:  [ ]Normal   [X]Dry mouth   [ ]ET Tube/Trach  [ ]Oral lesions  PULMONARY:   [X]Clear [ ]Tachypnea  [ ]Audible excessive secretions   [ ]Rhonchi        [ ]Right [ ]Left [ ]Bilateral  [ ]Crackles        [ ]Right [ ]Left [ ]Bilateral  [ ]Wheezing     [ ]Right [ ]Left [ ]Bilateral  [ ]Diminished BS [ ]Right [ ]Left [ ]Bilateral    CARDIOVASCULAR:    [ ]Regular [X]Irregular [ ]Tachy  [ ]Christiano [ ]Murmur [X]Other:  S3  GASTROINTESTINAL:  [X]Soft  [ ]Distended   [X]+BS  [X]Non tender [ ]Tender  [ ]Other [ ]PEG [ ]OGT/ NGT   Last BM: Uknown  GENITOURINARY:  [ ]Normal [ ] Incontinent   [ ]Oliguria/Anuria   [X]Chandler  MUSCULOSKELETAL:   [ ]Normal   [ ]Weakness  [X]Bed/Wheelchair bound [ ]Edema  NEUROLOGIC:   [X]No focal deficits  [ ] Cognitive impairment  [ ] Dysphagia [ ]Dysarthria [ ] Paresis   SKIN:   [ ]Normal  [ ]Rash  [X]Other Please seen RN Documentation which I have reviewed   [ ]Pressure ulcer(s)  [ ]y [X]n  Present on admission      CRITICAL CARE:  [X] Shock Present  [ ]Septic [X]Cardiogenic [ ]Neurologic [ ]Hypovolemic  [X]  Vasopressors [X]  Inotropes   [ ] Respiratory failure present [ ] Mechanical Ventilation [ ] Non-invasive ventilatory support [ ] High-Flow    [ ] Acute  [ ] Chronic [ ] Hypoxic  [ ] Hypercarbic [ ] Other  [X] Other organ failure: Renal Failure    LABS: No new labs    RADIOLOGY & ADDITIONAL STUDIES: No new radiology    PROTEIN CALORIE MALNUTRITION: [ ] mild [ ] moderate [X] severe  [X] underweight [ ] morbid obesity    https://www.andeal.org/vault/4189/web/files/ONC/Table_Clinical%20Characteristics%20to%20Document%20Malnutrition-White%20JV%20et%20al%202012.pdf        [X] PPSV2 < or = 30% [ ] significant weight loss [X] poor nutritional intake [ ] anasarca   Artificial Nutrition [ ]     Other REFERRALS:    [ ] Hospice  [ ]Child Life  [X]Social Work  [ ]Case management [ ]Holistic Therapy [ ] Physical Therapy [ ] Dietary       Palliative Performance Scale:  http://[facility].org/files/news/palliative_performance_scale_ppsv2.pdf  (Ctrl +  left click to view)  Respiratory Distress Observation Tool:  https://homecareinformation.net/handouts/hen/Respiratory_Distress_Observation_Scale.pdf (Ctrl +  left click to view)  PAINAD Score:  http://geriatrictoolkit.[place].[facility]/cog/painad.pdf (Ctrl +  left click to view)

## 2024-09-21 NOTE — PROGRESS NOTE ADULT - ASSESSMENT
79-year-old male with a history of HFrEF on home dobutamine 7.5 with last EF less than 20%, CAD, lungs mass - SCC, and urinary retention presented transferred from [facility] for management of heart failure after having syncopal episode, unable to wean off pressors and inotropes.  Patient has since transitioned to comfort care and transferred to PCU.  Will unlikely be able to wean off vasopressin

## 2024-09-21 NOTE — PROGRESS NOTE ADULT - PROBLEM SELECTOR PLAN 4
ischemic HFrEF (EF 20%) on home dobutamine 7.5 p/w ADHF.   Currently on capped inotrope/ pressor. s/p PICC placement 9/19 AM    - Continue home dobutamine 7.5 gtt  - Continue vasopressin 0.04 gtt, wean as tolerated  - Continue NC oxygen for comfort  - hold off on removing Shiley due to patient's health acuity

## 2024-09-21 NOTE — PROGRESS NOTE ADULT - PROBLEM SELECTOR PLAN 5
The patient requires nursing assistance with ADLs,  - PPS 10%  - Supportive care.  - Turn and position.  - Continue with good skin care.

## 2024-09-21 NOTE — PROGRESS NOTE ADULT - PROBLEM SELECTOR PLAN 7
- wife updated at bedside this AM, questions and concerns addressed   - c/w comfort directed care in the PCU, dispo dependent on clinical course and sxs management

## 2024-09-21 NOTE — PROGRESS NOTE ADULT - PROBLEM SELECTOR PLAN 2
likely 2/2 ADHF heart failure  prn use reviewed, as above     - start Dilaudid 0.5 mg q6hrs ATC   - c/w Dilaudid 0.5 mg q1hr PRN for Dyspnea  - c/w O2 via NC as tolerated   - c/w Robinul 0.4mg IV QID

## 2024-09-21 NOTE — PROGRESS NOTE ADULT - PROBLEM SELECTOR PLAN 6
Pt does not have capacity for complex medical decision making   Surrogate: wife  Code status: DNR/DNI   GOC: CMO see note from 9/16 for further details

## 2024-09-21 NOTE — PROGRESS NOTE ADULT - ATTENDING COMMENTS
79-year-old male with a history of HFrEF on home dobutamine 7.5 with last EF less than 20%, CAD, lungs mass - SCC, and urinary retention presented transferred from [facility] for management of heart failure after having syncopal episode, unable to wean off pressors and inotropes.  Patient has since transitioned to comfort care and transferred to PCU while continuing capped inotropics and pressors.     Due to uncontrolled respiratory distress, pain, and agitation, will:   -Start Dilaudid 0.5mg IV q 6 ATC and continuing Dilaudid 0.2-0.5mg IV q 1PRN moderate to severe pain and 0.5mg IV q 1PRN respiratory distress.   -Continue Ativan 0.5mg IV q 6 ATC and 1mg IV q 1PRN agitation   -Continue Dobutamine and Vaso but capped.   -Rest of assessment and plan as per Dr. Ye's note above.         Juan Reddy MD  Associate Chief Geriatrics and Palliative Care ([facility]) [facility]   [place] [facility]   , [employer] at \[facility]\""/[facility]      Please contact me via Teams from Monday through Friday between 9am-5pm. If not answering, please call the palliative care pager [phone]    After 5pm and on weekends, please see the contact information below:    In the event of newly developing, evolving, or worsening symptoms, please contact the Palliative Medicine team via pager (if the patient is at [facility] #[phone] or if the patient is at [facility] #[phone]) The Geriatric and Palliative Medicine service has coverage 24 hours a day/ 7 days a week to provide medical recommendations regarding symptom management needs via telephone

## 2024-09-21 NOTE — PROGRESS NOTE ADULT - PROBLEM SELECTOR PLAN 3
Pt w/ chronic ho anxiety, unable to take PO, noted increasing anxiety/agitation, likely terminal agitation     - c/w Lorazepam 0.5mg IV q6hr ATC  - c/w Lorazepam 1.0mg IV q1hour PRN for agitation/anxiety

## 2024-09-21 NOTE — PROGRESS NOTE ADULT - PROBLEM SELECTOR PLAN 1
24hr PRN use reviewed 7AM to 7AM: dilaudid .2 mg IV for moderate pain x1, .5mg for dyspnea x2, .5mg for dyspnea x2, = 2.2mg total of dilaudid     - Start Dilaudid 0.5 mg q6hrs ATC   - Dilaudid 0.2 mg q1hr PRN for Moderate Pain   - Dilaudid 0.5 mg q1hr PRN for Severe Pain   - Bowel regimen while on opioids.  Monitor for constipation.

## 2024-09-21 NOTE — PROGRESS NOTE ADULT - SUBJECTIVE AND OBJECTIVE BOX
GAP TEAM PALLIATIVE CARE UNIT PROGRESS NOTE:      [  ] Patient on hospice program.    HPI:  79-year-old male with a history of HFrEF on home dobutamine 7.5 with last EF less than 20%, CAD, lungs mass - SCC, and urinary retention presented transferred from [facility] for management of heart failure after having syncopal episode last night.  Patient also having reduced urine output despite 6 mg of Bumex twice a day.  At [facility] patient had a workup which was notable for sodium of 124, creatinine to 4.6 from his baseline of approximately 2-2.1, proBNP 5220, lactate 1.9.  He was transferred here for further evaluation and  heart failure management, admitted to CICU. Patient unable to be weaned off vasopressors and not responding to diuretics.  After discussions with wife and patient, patient transitioned to comfort care.     INDICATION FOR PALLIATIVE CARE UNIT SERVICES/Interval HPI:  Patient seen and examined at bedside, accompanied by wife. Patient is unarousable. Febrile. 24hr PRN use reviewed 7AM to 7AM: dilaudid .2 mg IV for moderate pain x1, .5mg for dyspnea x2, .5mg for dyspnea x2, = 2.2mg total of dilaudid = 44 OME, ativan 1mg for anxiety x1.     DNR on chart: YES  DNI    Allergies  No Known Allergies  Intolerances    MEDICATIONS  (STANDING):  chlorhexidine 2% Cloths 1 Application(s) Topical daily  DOBUTamine Infusion 7.504 MICROgram(s)/kG/Min (8.6 mL/Hr) IV Continuous <Continuous>  HYDROmorphone  Injectable 0.5 milliGRAM(s) IV Push every 6 hours  LORazepam   Injectable 0.5 milliGRAM(s) IV Push every 6 hours  vasopressin Infusion 0.04 Unit(s)/Min (6 mL/Hr) IV Continuous <Continuous>    MEDICATIONS  (PRN):  acetaminophen  Suppository .. 650 milliGRAM(s) Rectal every 6 hours PRN Temp greater or equal to 38C (100.4F), Mild Pain (1 - 3)  bisacodyl Suppository 10 milliGRAM(s) Rectal daily PRN Constipation  glycopyrrolate Injectable 0.4 milliGRAM(s) IV Push every 6 hours PRN Secretions  HYDROmorphone  Injectable 0.5 milliGRAM(s) IV Push every 1 hour PRN Severe Pain (7 - 10)  HYDROmorphone  Injectable 0.5 milliGRAM(s) IV Push every 1 hour PRN Dyspnea  HYDROmorphone  Injectable 0.2 milliGRAM(s) IV Push every 1 hour PRN Moderate Pain (4 - 6)  lidocaine 2% Gel 1 Application(s) Topical four times a day PRN Discomfort  LORazepam   Injectable 1 milliGRAM(s) IV Push every 1 hour PRN Anxiety/Agitation    ITEMS UNCHECKED ARE NOT PRESENT    PRESENT SYMPTOMS: [X]Unable to self-report see PAINAD, RDOS below  Source if other than patient:  [ ]Family   [ ]Team     Pain: [ ] yes [ ] no  QOL impact -   Location -                    Aggravating factors -  Quality -  Radiation -  Timing-  Severity (0-10 scale):  Minimal acceptable level (0-10 scale):       PCSSQ [Palliative Care Spiritual Screening Question]   Severity (0-10):  Score of 4 or > indicate consideration of Chaplaincy referral.  Chaplaincy Referral: [X] yes [ ] refused [X] following [ ] deferred    Caregiver [place]? : [X] yes [ ] no [ ] deferred:  Social work referral [X] Patient & Family Centered Care Referral [ ]   Anticipatory Grief present?:  [X] yes [ ] no [ ] deferred:  Social work referral [X] Patient & Family Centered Care Referral [ ]  	  Other Symptoms:  [X]All other review of systems negative [ ] Patient unable to give ROS    PHYSICAL EXAM:     Vital Signs Last 24 Hrs  T(C): 36.7 (21 Sep 2024 08:45), Max: 36.7 (21 Sep 2024 08:45)  T(F): 98 (21 Sep 2024 08:45), Max: 98 (21 Sep 2024 08:45)  HR: 63 (21 Sep 2024 08:45) (63 - 63)  BP: 101/64 (21 Sep 2024 08:45) (101/64 - 101/64)  BP(mean): --  RR: 18 (21 Sep 2024 08:45) (18 - 18)  SpO2: 99% (21 Sep 2024 08:45) (99% - 99%)    Parameters below as of 21 Sep 2024 08:45  Patient On (Oxygen Delivery Method): nasal cannula    GENERAL: [X] Cachexia  [ ]Alert  [ ]Oriented  [X]Lethargic  [X]Unarousable  [ ]Verbal  [ ]Non-Verbal   Behavioral:   [ ] Anxiety  [ ] Delirium [ ] Agitation [ ] Other: Appears comfortable  HEENT:  [ ]Normal   [X]Dry mouth   [ ]ET Tube/Trach  [ ]Oral lesions  PULMONARY:   [X]Clear [ ]Tachypnea  [ ]Audible excessive secretions   [ ]Rhonchi        [ ]Right [ ]Left [ ]Bilateral  [ ]Crackles        [ ]Right [ ]Left [ ]Bilateral  [ ]Wheezing     [ ]Right [ ]Left [ ]Bilateral  [ ]Diminished BS [ ]Right [ ]Left [ ]Bilateral    CARDIOVASCULAR:    [ ]Regular [X]Irregular [ ]Tachy  [ ]Christiano [ ]Murmur [X]Other:  S3  GASTROINTESTINAL:  [X]Soft  [ ]Distended   [X]+BS  [X]Non tender [ ]Tender  [ ]Other [ ]PEG [ ]OGT/ NGT   Last BM: 9/15  GENITOURINARY:  [ ]Normal [ ] Incontinent   [ ]Oliguria/Anuria   [X]Chandler  MUSCULOSKELETAL:   [ ]Normal   [ ]Weakness  [X]Bed/Wheelchair bound [ ]Edema  NEUROLOGIC:   [X]No focal deficits  [ ] Cognitive impairment  [ ] Dysphagia [ ]Dysarthria [ ] Paresis   SKIN:   [ ]Normal  [ ]Rash  [X]Other Please seen RN Documentation which I have reviewed, sacrum, penis   [ ]Pressure ulcer(s)  [ ]y [X]n  Present on admission      CRITICAL CARE:  [X] Shock Present  [ ]Septic [X]Cardiogenic [ ]Neurologic [ ]Hypovolemic  [X]  Vasopressors [X]  Inotropes   [ ] Respiratory failure present [ ] Mechanical Ventilation [ ] Non-invasive ventilatory support [ ] High-Flow    [ ] Acute  [ ] Chronic [ ] Hypoxic  [ ] Hypercarbic [ ] Other  [X] Other organ failure: Renal Failure, skin     LABS: No new labs    RADIOLOGY & ADDITIONAL STUDIES: No new radiology    PROTEIN CALORIE MALNUTRITION: [ ] mild [ ] moderate [X] severe  [X] underweight [ ] morbid obesity    https://www.andeal.org/vault/7182/web/files/ONC/Table_Clinical%20Characteristics%20to%20Document%20Malnutrition-White%20JV%20et%20al%202012.pdf    [X] PPSV2 < or = 30% [ ] significant weight loss [X] poor nutritional intake [ ] anasarca   Artificial Nutrition [ ]     Other REFERRALS:    [ ] Hospice  [ ]Child Life  [X]Social Work  [ ]Case management [ ]Holistic Therapy [ ] Physical Therapy [ ] Dietary       Palliative Performance Scale:  http://npcrc.org/files/news/palliative_performance_scale_ppsv2.pdf  (Ctrl +  left click to view)  Respiratory Distress Observation Tool:  https://homecareinformation.net/handouts/hen/Respiratory_Distress_Observation_Scale.pdf (Ctrl +  left click to view)  PAINAD Score:  http://geriatrictoolkit.[place].[facility]/cog/painad.pdf (Ctrl +  left click to view)

## 2024-09-22 NOTE — PROGRESS NOTE ADULT - NSPROGADDITIONALINFOA_GEN_ALL_CORE
Note incomplete until attested by attending.
Note incomplete until attested by attending.
Patient seen under the direct supervision of Dr. Arellano who was directly involved in patient care and decision making.    This note is not finalized until reviewed and signed by Attending Physician Dr Arellano.     Stevie Zuniga DO   Fellow - Hospice & Palliative Medicine

## 2024-09-22 NOTE — PROGRESS NOTE ADULT - PROBLEM SELECTOR PLAN 3
Pt w/ chronic ho anxiety, unable to take PO, noted increasing anxiety/agitation, likely terminal agitation   24hr PRN use reviewed 7AM to 7AM: required ativan 1mg for anxiety x3.     - increase ATC Lorazepam to 1.0mg IV q6hr   - c/w Lorazepam 1.0mg IV q1hour PRN for agitation/anxiety Pt w/ chronic ho anxiety, unable to take PO, noted increasing anxiety/agitation, likely terminal agitation   24hr PRN use reviewed 7AM to 7AM: required ativan 1mg for anxiety x3.     - ATC Lorazepam increased to 1.0mg IV q6hr, noted to require 3 prns during the day, increased to q4hrs.   - c/w Lorazepam 1.0mg IV q1hour PRN for agitation/anxiety

## 2024-09-22 NOTE — PROGRESS NOTE ADULT - PROBLEM SELECTOR PLAN 7
- wife updated at bedside this AM, questions and concerns addressed   - c/w comfort directed care in the PCU, dispo dependent on clinical course and sxs management - wife updated at bedside this AM, questions and concerns addressed   - later spoke w/ RN, decided to stop dobutamine and vasopressin drips d/t concern they may be causing discomfort.   - c/w comfort directed care in the PCU, dispo dependent on clinical course and sxs management

## 2024-09-22 NOTE — PROGRESS NOTE ADULT - PROBLEM SELECTOR PLAN 1
24hr PRN use reviewed 7AM to 7AM: dilaudid .5 mg IV for severe pain x1, .5mg for dyspnea x2, = 1.5 mg total of dilaudid, ativan 1mg for anxiety x3.     - increase Dilaudid ATC to 0.75mg IV q4hrs  - Dilaudid 0.2 mg q1hr PRN for Moderate Pain   - Dilaudid 0.5 mg q1hr PRN for Severe Pain   - Bowel regimen while on opioids.  Monitor for constipation.

## 2024-09-22 NOTE — PROGRESS NOTE ADULT - PROBLEM SELECTOR PLAN 2
likely 2/2 ADHF heart failure  prn use reviewed, as above     - increase Dilaudid ATC to 0.75mg IV q4hrs  - c/w Dilaudid 0.5 mg q1hr PRN for Dyspnea  - c/w O2 via NC as tolerated   - c/w Robinul 0.4mg IV QID

## 2024-09-22 NOTE — PROGRESS NOTE ADULT - ATTENDING COMMENTS
79-year-old male with a history of HFrEF on home dobutamine 7.5 with last EF less than 20%, CAD, lungs mass - SCC, and urinary retention presented transferred from [facility] for management of heart failure after having syncopal episode, unable to wean off pressors and inotropes.  Patient has since transitioned to comfort care and transferred to PCU while continuing capped inotropics and pressors.     Due to uncontrolled respiratory distress, pain, and agitation, will:   -Increase Dilaudid ATC to 0.75mg IV q 4 ATC and continuing Dilaudid 0.2-0.5mg IV q 1PRN moderate to severe pain and 0.5mg IV q 1PRN respiratory distress.   -Increase Ativan ATC to 1mg IV q 6hrs and continue 1mg IV q 1PRN agitation   -Continue Dobutamine and Vaso but capped.   -Rest of assessment and plan as per Dr. Ye's note above.         Juan Reddy MD  Associate Chief Geriatrics and Palliative Care ([facility]) [facility]   [place] [facility]   , [employer] at [facility]      Please contact me via Teams from Monday through Friday between 9am-5pm. If not answering, please call the palliative care pager [phone]    After 5pm and on weekends, please see the contact information below:    In the event of newly developing, evolving, or worsening symptoms, please contact the Palliative Medicine team via pager (if the patient is at [facility] #[phone] or if the patient is at [facility] #[phone]) The Geriatric and Palliative Medicine service has coverage 24 hours a day/ 7 days a week to provide medical recommendations regarding symptom management needs via telephone

## 2024-09-22 NOTE — PROGRESS NOTE ADULT - SUBJECTIVE AND OBJECTIVE BOX
GAP TEAM PALLIATIVE CARE UNIT PROGRESS NOTE:      [  ] Patient on hospice program.    HPI:  79-year-old male with a history of HFrEF on home dobutamine 7.5 with last EF less than 20%, CAD, lungs mass - SCC, and urinary retention presented transferred from [facility] for management of heart failure after having syncopal episode last night.  Patient also having reduced urine output despite 6 mg of Bumex twice a day.  At [facility] patient had a workup which was notable for sodium of 124, creatinine to 4.6 from his baseline of approximately 2-2.1, proBNP 5220, lactate 1.9.  He was transferred here for further evaluation and  heart failure management, admitted to CICU. Patient unable to be weaned off vasopressors and not responding to diuretics.  After discussions with wife and patient, patient transitioned to comfort care.     INDICATION FOR PALLIATIVE CARE UNIT SERVICES/Interval HPI:  Patient seen and examined at bedside, hypothermic to 35.6, w/ cold extremities, unarousable. Febrile. 24hr PRN use reviewed 7AM to 7AM: dilaudid .5 mg IV for severe pain x1, .5mg for dyspnea x2, = 1.5 mg total of dilaudid, ativan 1mg for anxiety x3.     DNR on chart: YES  DNI    Allergies  No Known Allergies  Intolerances    MEDICATIONS  (STANDING):  chlorhexidine 2% Cloths 1 Application(s) Topical daily  DOBUTamine Infusion 7.504 MICROgram(s)/kG/Min (8.6 mL/Hr) IV Continuous <Continuous>  HYDROmorphone  Injectable 0.75 milliGRAM(s) IV Push every 4 hours  LORazepam   Injectable 1 milliGRAM(s) IV Push every 6 hours  vasopressin Infusion 0.04 Unit(s)/Min (6 mL/Hr) IV Continuous <Continuous>    MEDICATIONS  (PRN):  acetaminophen  Suppository .. 650 milliGRAM(s) Rectal every 6 hours PRN Temp greater or equal to 38C (100.4F), Mild Pain (1 - 3)  bisacodyl Suppository 10 milliGRAM(s) Rectal daily PRN Constipation  glycopyrrolate Injectable 0.4 milliGRAM(s) IV Push every 6 hours PRN Secretions  HYDROmorphone  Injectable 0.5 milliGRAM(s) IV Push every 1 hour PRN Dyspnea  HYDROmorphone  Injectable 0.5 milliGRAM(s) IV Push every 1 hour PRN Severe Pain (7 - 10)  HYDROmorphone  Injectable 0.2 milliGRAM(s) IV Push every 1 hour PRN Moderate Pain (4 - 6)  lidocaine 2% Gel 1 Application(s) Topical four times a day PRN Discomfort  LORazepam   Injectable 1 milliGRAM(s) IV Push every 1 hour PRN Anxiety/Agitation    ITEMS UNCHECKED ARE NOT PRESENT    PRESENT SYMPTOMS: [X]Unable to self-report see PAINAD, RDOS below  Source if other than patient:  [ ]Family   [ ]Team     Pain: [ ] yes [ ] no  QOL impact -   Location -                    Aggravating factors -  Quality -  Radiation -  Timing-  Severity (0-10 scale):  Minimal acceptable level (0-10 scale):       PCSSQ [Palliative Care Spiritual Screening Question]   Severity (0-10):  Score of 4 or > indicate consideration of Chaplaincy referral.  Chaplaincy Referral: [X] yes [ ] refused [X] following [ ] deferred    Caregiver [place]? : [X] yes [ ] no [ ] deferred:  Social work referral [X] Patient & Family Centered Care Referral [ ]   Anticipatory Grief present?:  [X] yes [ ] no [ ] deferred:  Social work referral [X] Patient & Family Centered Care Referral [ ]  	  Other Symptoms:  [X]All other review of systems negative [ ] Patient unable to give ROS    PHYSICAL EXAM:   Vital Signs Last 24 Hrs  T(C): 35.6 (22 Sep 2024 07:25), Max: 35.6 (22 Sep 2024 07:25)  T(F): 96.1 (22 Sep 2024 07:25), Max: 96.1 (22 Sep 2024 07:25)  HR: 72 (22 Sep 2024 07:25) (72 - 72)  BP: 82/50 (22 Sep 2024 07:25) (82/50 - 82/50)  BP(mean): --  RR: 12 (22 Sep 2024 07:25) (12 - 12)  SpO2: --    GENERAL: [X] Cachexia  [ ]Alert  [ ]Oriented  [X]Lethargic  [X]Unarousable  [ ]Verbal  [ ]Non-Verbal   Behavioral:   [ ] Anxiety  [ ] Delirium [ ] Agitation [ ] Other: Appears comfortable  HEENT:  [ ]Normal   [X]Dry mouth   [ ]ET Tube/Trach  [ ]Oral lesions  PULMONARY:   [X]Clear [ ]Tachypnea  [ ]Audible excessive secretions   [ ]Rhonchi        [ ]Right [ ]Left [ ]Bilateral  [ ]Crackles        [ ]Right [ ]Left [ ]Bilateral  [ ]Wheezing     [ ]Right [ ]Left [ ]Bilateral  [ ]Diminished BS [ ]Right [ ]Left [ ]Bilateral    CARDIOVASCULAR:    [ ]Regular [X]Irregular [ ]Tachy  [ ]Christiano [ ]Murmur [X]Other:  S3  GASTROINTESTINAL:  [X]Soft  [ ]Distended   [X]+BS  [X]Non tender [ ]Tender  [ ]Other [ ]PEG [ ]OGT/ NGT   Last BM: 9/21  GENITOURINARY:  [ ]Normal [ ] Incontinent   [ ]Oliguria/Anuria   [X]Chandler  MUSCULOSKELETAL:   [ ]Normal   [ ]Weakness  [X]Bed/Wheelchair bound [ ]Edema  NEUROLOGIC:   []No focal deficits  [x ] Cognitive impairment  [ ] Dysphagia [ ]Dysarthria [ ] Paresis   SKIN:   [ ]Normal  [ ]Rash  [X]Other Please seen RN Documentation which I have reviewed, sacrum, penis   [ ]Pressure ulcer(s)  [ ]y [X]n  Present on admission      CRITICAL CARE:  [X] Shock Present  [ ]Septic [X]Cardiogenic [ ]Neurologic [ ]Hypovolemic  [X]  Vasopressors [X]  Inotropes   [ ] Respiratory failure present [ ] Mechanical Ventilation [ ] Non-invasive ventilatory support [ ] High-Flow    [ ] Acute  [ ] Chronic [ ] Hypoxic  [ ] Hypercarbic [ ] Other  [X] Other organ failure: Renal Failure, skin     LABS: No new labs    RADIOLOGY & ADDITIONAL STUDIES: No new radiology    PROTEIN CALORIE MALNUTRITION: [ ] mild [ ] moderate [X] severe  [X] underweight [ ] morbid obesity    https://www.andeal.org/vault/2440/web/files/ONC/Table_Clinical%20Characteristics%20to%20Document%20Malnutrition-White%20JV%20et%20al%202012.pdf    [X] PPSV2 < or = 30% [ ] significant weight loss [X] poor nutritional intake [ ] anasarca   Artificial Nutrition [ ]     Other REFERRALS:    [ ] Hospice  [ ]Child Life  [X]Social Work  [ ]Case management [ ]Holistic Therapy [ ] Physical Therapy [ ] Dietary       Palliative Performance Scale:  http://npcrc.org/files/news/palliative_performance_scale_ppsv2.pdf  (Ctrl +  left click to view)  Respiratory Distress Observation Tool:  https://homecareinformation.net/handouts/hen/Respiratory_Distress_Observation_Scale.pdf (Ctrl +  left click to view)  PAINAD Score:  http://geriatrictoolkit.[facility]/cog/painad.pdf (Ctrl +  left click to view)

## 2024-09-22 NOTE — PROGRESS NOTE ADULT - ASSESSMENT
79-year-old male with a history of HFrEF on home dobutamine 7.5 with last EF less than 20%, CAD, lungs mass - SCC, and urinary retention presented transferred from [facility] for management of heart failure after having syncopal episode, unable to wean off pressors and inotropes.  Patient has since transitioned to comfort care and transferred to PCU.  Will unlikely be able to wean off vasopressin

## 2024-09-23 NOTE — PROGRESS NOTE ADULT - SUBJECTIVE AND OBJECTIVE BOX
GAP TEAM PALLIATIVE CARE UNIT PROGRESS NOTE:      [  ] Patient on hospice program.    INDICATION FOR PALLIATIVE CARE UNIT SERVICES/Interval HPI:    OVERNIGHT EVENTS:    DNR on chart: DNI  DNI      Allergies    No Known Allergies    Intolerances    atorvastatin (Muscle Pain (Severe))  MEDICATIONS  (STANDING):  chlorhexidine 2% Cloths 1 Application(s) Topical daily  HYDROmorphone Infusion 0.2 mG/Hr (0.2 mL/Hr) IV Continuous <Continuous>  LORazepam   Injectable 1 milliGRAM(s) IV Push every 4 hours    MEDICATIONS  (PRN):  acetaminophen  Suppository .. 650 milliGRAM(s) Rectal every 6 hours PRN Temp greater or equal to 38C (100.4F), Mild Pain (1 - 3)  bisacodyl Suppository 10 milliGRAM(s) Rectal daily PRN Constipation  glycopyrrolate Injectable 0.4 milliGRAM(s) IV Push every 6 hours PRN Secretions  HYDROmorphone  Injectable 0.5 milliGRAM(s) IV Push every 1 hour PRN Severe Pain (7 - 10)  HYDROmorphone  Injectable 0.5 milliGRAM(s) IV Push every 1 hour PRN Dyspnea  HYDROmorphone  Injectable 0.2 milliGRAM(s) IV Push every 1 hour PRN Moderate Pain (4 - 6)  lidocaine 2% Gel 1 Application(s) Topical four times a day PRN Discomfort  LORazepam   Injectable 1 milliGRAM(s) IV Push every 1 hour PRN Anxiety/Agitation    ITEMS UNCHECKED ARE NOT PRESENT    PRESENT SYMPTOMS: [ ]Unable to self-report  [ ]PAINADs [ ]RDOS  Source if other than patient:  [ ]Family   [ ]Team     Pain: [ ] yes [ ] no  QOL impact -   Location -                    Aggravating factors -  Quality -  Radiation -  Timing-  Severity (0-10 scale):  Minimal acceptable level (0-10 scale):     PAINAD Score:  [email or web address]/cog/painad.pdf (Ctrl +  left click to view)    Dyspnea:                           [ ]Mild [ ]Moderate [ ]Severe    RDOS:  0 to 2  minimal or no respiratory distress   3  mild distress  4 to 6 moderate distress  >7 severe distress  https://[email or web address]/handouts/hen/Respiratory_Distress_Observation_Scale.pdf (Ctrl +  left click to view)    Anxiety:                             [ ]Mild [ ]Moderate [ ]Severe  Fatigue:                             [ ]Mild [ ]Moderate [ ]Severe  Nausea:                             [ ]Mild [ ]Moderate [ ]Severe  Loss of appetite:              [ ]Mild [ ]Moderate [ ]Severe  Constipation:                    [ ]Mild [ ]Moderate [ ]Severe    Other Symptoms:  [ ]All other review of systems negative    Palliative Performance Status Version 2:         %  http://[facility].org/files/news/palliative_performance_scale_ppsv2.pdf    PHYSICAL EXAM:   Vital Signs Last 24 Hrs  T(C): 35.6 (23 Sep 2024 06:45), Max: 35.6 (23 Sep 2024 06:45)  T(F): 96 (23 Sep 2024 06:45), Max: 96 (23 Sep 2024 06:45)  HR: 62 (23 Sep 2024 06:45) (62 - 62)  BP: 71/48 (23 Sep 2024 06:45) (71/48 - 71/48)  BP(mean): --  RR: 16 (23 Sep 2024 06:45) (16 - 16)  SpO2: 83% (23 Sep 2024 06:45) (83% - 83%)    Parameters below as of 23 Sep 2024 06:45  Patient On (Oxygen Delivery Method): room air     I&O's Summary    22 Sep 2024 07:01  -  23 Sep 2024 07:00  --------------------------------------------------------  IN: 0 mL / OUT: 600 mL / NET: -600 mL      GENERAL: [ ] Cachexia  [ ]Alert  [ ]Oriented x   [ ]Lethargic  [ ]Unarousable  [ ]Verbal  [ ]Non-Verbal  Behavioral:   [ ] Anxiety  [ ] Delirium [ ] Agitation [ ] Other  HEENT:  [ ]Normal   [ ]Dry mouth   [ ]ET Tube/Trach  [ ]Oral lesions  PULMONARY:   [ ]Clear [ ]Tachypnea  [ ]Audible excessive secretions   [ ]Rhonchi        [ ]Right [ ]Left [ ]Bilateral  [ ]Crackles        [ ]Right [ ]Left [ ]Bilateral  [ ]Wheezing     [ ]Right [ ]Left [ ]Bilateral  [ ]Diminished BS [ ]Right [ ]Left [ ]Bilateral    CARDIOVASCULAR:    [ ]Regular [ ]Irregular [ ]Tachy  [ ]Christiano [ ]Murmur [ ]Other  GASTROINTESTINAL:  [ ]Soft  [ ]Distended   [ ]+BS  [ ]Non tender [ ]Tender  [ ]Other [ ]PEG [ ]OGT/ NGT   Last BM:    GENITOURINARY:  [ ]Normal [ ] Incontinent   [ ]Oliguria/Anuria   [ ]Chandler  MUSCULOSKELETAL:   [ ]Normal   [ ]Weakness  [ ]Bed/Wheelchair bound [ ]Edema  NEUROLOGIC:   [ ]No focal deficits  [ ] Cognitive impairment  [ ] Dysphagia [ ]Dysarthria [ ] Paresis [ ]Other   SKIN:   [ ]Normal  [ ]Rash  [ ]Other  [ ]Pressure ulcer(s)  [ ]y [ ]n  Present on admission      CRITICAL CARE:  [ ] Shock Present  [ ]Septic [ ]Cardiogenic [ ]Neurologic [ ]Hypovolemic  [ ]  Vasopressors [ ]  Inotropes   [ ] Respiratory failure present [ ] Mechanical Ventilation [ ] Non-invasive ventilatory support [ ] High-Flow  [ ] Acute  [ ] Chronic [ ] Hypoxic  [ ] Hypercarbic [ ] Other  [ ] Other organ failure     LABS:            RADIOLOGY & ADDITIONAL STUDIES:    PROTEIN CALORIE MALNUTRITION: [ ] mild [ ] moderate [ ] severe  [ ] underweight [ ] morbid obesity    https://www.andeal.org/vault/2440/web/files/ONC/Table_Clinical%20Characteristics%20to%20Document%20Malnutrition-White%20JV%20et%20al%202012.pdf    Height (cm): 182.9 (09-07-24 @ 22:22), 182.9 (09-07-24 @ 10:27), 182.9 (08-05-24 @ 09:41)  Weight (kg): 76.4 (09-07-24 @ 22:22), 74.8 (09-07-24 @ 10:27), 70.3 (08-05-24 @ 09:41)  BMI (kg/m2): 22.8 (09-07-24 @ 22:22), 22.4 (09-07-24 @ 10:27), 21 (08-05-24 @ 09:41)    [ ] PPSV2 < or = 30% [ ] significant weight loss [ ] poor nutritional intake [ ] anasarca   Artificial Nutrition [ ]     REFERRALS:   [ ]Chaplaincy  [ ] Hospice  [ ]Child Life  [ ]Social Work  [ ]Case management [ ]Holistic Therapy [ ] Physical Therapy [ ] Dietary   Goals of Care Document: PREMA Muse (09-13-24 @ 15:36)  Goals of Care Conversation:   Participants   · Participants  Family; Staff  · Spouse  Kenia  · Provider  Dr. Chavez of [facility]  ·   Adam Muse LCSW    Advance Directives   · Does patient have Advance Directive  Yes  · Indicate Type  Do Not Resuscitate (DNR); Medical Orders for Life-Sustaining Treatment (MOLST)  · Are any of the items on the chart  Yes  · Specify which ones are on chart  Do Not Resuscitate (DNR)  Medical Orders for Life-Sustaining Treatment (MOLST)  · Does Patient Have a Surrogate  Yes  · Surrogate's Name  spouse Kenia  · Does the Patient have a Court Appointed Guardian ([address])  No  · Caregiver:  no    Conversation Discussion   · Conversation  Diagnosis; Prognosis; Treatment Options; Chaplaincy Referral; Palliative Care Referral  · Conversation Details  GAP continues to follow this case for ongoing GOC discussion and support. MOLINA and Dr. Chavez met with patient's spouse this AM with follow up GOC discussion this afternoon regarding next steps.    Team first reintroduced selves and roles in patient care. Kenia verbalized understanding and was receptive to visit this AM. Kenia notes that she is aware that the team is looking for a decision regarding next steps but discussed wishing to engage patient in this discussion further as she does not wish to make any decisions without patient participation if possible. Team validated this with Kenia today and discussed plan for follow up this afternoon to further assess goals moving forward. Kenia verbalized agreement.    Team revisited Kenia this afternoon and Kenia informed team that she was able to engage with patient regarding his status and wishes given worsening clinical status. Kenia states that patient was able to tell her x2 that he does not find his present state to be an acceptable quality of life and wishes to instead transition the focus of his care to comfort. Kenia notes that this was patient's decision, and team validated Kenia in advocating for patient's wishes and needs, and the selfless nature of being patient's voice in such an overwhelming and difficult time. Much emotional support and active listening provided. Team encouraged continued utilization of physical touch and life review today throughout Kenia's visit, connecting patient with home and family. Kenia verbalized agreement, and notes that as much as it is distressing to her to lose patient, she is advocating for what patient wants after a long and arduous healthcare journey. Team again validated this today and assured ongoing availability and support.     Patient with capped pressors and is already DNR/I. If patient is hemodynamically stable, patient to be evaluated for PCU tomorrow, 9/14. Patient to remain on CICU at present time for ongoing EOL care and management.    Kenia notes that patient is Jewish.  called for last rites and prayer.     GAP will continue to follow this case.     I, Adam Muse, where applicable, am scribing for and the presence of Dr. Chavez the following sections PARTICIPANTS, ADVANCED DIRECTIVES, CONVERSATION DISCUSSION, WHAT MATTERS MOST TO PATIENTS AND FAMILY, TREATMENT GUIDELINES, DATE OF MOLST, AND TIME SPENT.    What Matters Most To Patient and Family   · What matters most to patient and family  patient comfort, time spent with family    Personal Advance Directives Treatment Guidelines:   Treatment Guidelines   · Decision Maker  Surrogate  · Treatment Guidelines  DNR; DNI    MOLST   · Completed  10-Sep-2024    Location of Discussion:   Time Spent on Advance Care Planning   Attending or TUSHAR Only.     I personally spent 46 minutes on advance care planning services with the patient. This time is separate and distinct from any other care management services provided on this date.    Location of Discussion   · Location of discussion  Face to face    Electronic Signatures for Addendum Section:   Amanda Chavez) (Signed Addendum 13-Sep-2024 16:27)    I personally performed the services described in the documentation, reviewed the documentation recorded by the LCSW (who acted as a scribe in addition to actively participating in the meeting) in my presence and it accurately and completely records my words and actions.    Electronic Signatures:  Adam Muse ([facility] [place])  (Signed 13-Sep-2024 16:19)  	Authored: Goals of Care Conversation, Personal Advance Directives Treatment Guidelines, Location of Discussion      Last Updated: 13-Sep-2024 16:27 by Amanda Chavez)     GAP TEAM PALLIATIVE CARE UNIT PROGRESS NOTE:      [  ] Patient on hospice program.    INDICATION FOR PALLIATIVE CARE UNIT SERVICES/Interval HPI: 79-year-old male with a history of HFrEF on home dobutamine 7.5 with last EF less than 20%, CAD, lungs mass - SCC, and urinary retention presented transferred from [facility] for management of heart failure after having syncopal episode last night.  Patient also having reduced urine output despite 6 mg of Bumex twice a day.  At [facility] patient had a workup which was notable for sodium of 124, creatinine to 4.6 from his baseline of approximately 2-2.1, proBNP 5220, lactate 1.9.  He was transferred here for further evaluation and  heart failure management, admitted to CICU. Patient unable to be weaned off vasopressors and not responding to diuretics.  After discussions with wife and patient, patient transitioned to comfort care.     OVERNIGHT EVENTS:    DNR on chart: DNI  DNI      Allergies    No Known Allergies    Intolerances    atorvastatin (Muscle Pain (Severe))  MEDICATIONS  (STANDING):  chlorhexidine 2% Cloths 1 Application(s) Topical daily  HYDROmorphone Infusion 0.2 mG/Hr (0.2 mL/Hr) IV Continuous <Continuous>  LORazepam   Injectable 1 milliGRAM(s) IV Push every 4 hours    MEDICATIONS  (PRN):  acetaminophen  Suppository .. 650 milliGRAM(s) Rectal every 6 hours PRN Temp greater or equal to 38C (100.4F), Mild Pain (1 - 3)  bisacodyl Suppository 10 milliGRAM(s) Rectal daily PRN Constipation  glycopyrrolate Injectable 0.4 milliGRAM(s) IV Push every 6 hours PRN Secretions  HYDROmorphone  Injectable 0.5 milliGRAM(s) IV Push every 1 hour PRN Severe Pain (7 - 10)  HYDROmorphone  Injectable 0.5 milliGRAM(s) IV Push every 1 hour PRN Dyspnea  HYDROmorphone  Injectable 0.2 milliGRAM(s) IV Push every 1 hour PRN Moderate Pain (4 - 6)  lidocaine 2% Gel 1 Application(s) Topical four times a day PRN Discomfort  LORazepam   Injectable 1 milliGRAM(s) IV Push every 1 hour PRN Anxiety/Agitation    ITEMS UNCHECKED ARE NOT PRESENT    PRESENT SYMPTOMS: [ ]Unable to self-report  [ ]PAINADs [ ]RDOS  Source if other than patient:  [ ]Family   [ ]Team     Pain: [ ] yes [ ] no  QOL impact -   Location -                    Aggravating factors -  Quality -  Radiation -  Timing-  Severity (0-10 scale):  Minimal acceptable level (0-10 scale):     PAINAD Score:  http://[email or web address]/painad.pdf (Ctrl +  left click to view)    Dyspnea:                           [ ]Mild [ ]Moderate [ ]Severe    RDOS:  0 to 2  minimal or no respiratory distress   3  mild distress  4 to 6 moderate distress  >7 severe distress  https://[email or web address]/handouts/hen/Respiratory_Distress_Observation_Scale.pdf (Ctrl +  left click to view)    Anxiety:                             [ ]Mild [ ]Moderate [ ]Severe  Fatigue:                             [ ]Mild [ ]Moderate [ ]Severe  Nausea:                             [ ]Mild [ ]Moderate [ ]Severe  Loss of appetite:              [ ]Mild [ ]Moderate [ ]Severe  Constipation:                    [ ]Mild [ ]Moderate [ ]Severe    Other Symptoms:  [ ]All other review of systems negative    Palliative Performance Status Version 2:         %  http://npcrc.org/files/news/palliative_performance_scale_ppsv2.pdf    PHYSICAL EXAM:   Vital Signs Last 24 Hrs  T(C): 35.6 (23 Sep 2024 06:45), Max: 35.6 (23 Sep 2024 06:45)  T(F): 96 (23 Sep 2024 06:45), Max: 96 (23 Sep 2024 06:45)  HR: 62 (23 Sep 2024 06:45) (62 - 62)  BP: 71/48 (23 Sep 2024 06:45) (71/48 - 71/48)  BP(mean): --  RR: 16 (23 Sep 2024 06:45) (16 - 16)  SpO2: 83% (23 Sep 2024 06:45) (83% - 83%)    Parameters below as of 23 Sep 2024 06:45  Patient On (Oxygen Delivery Method): room air     I&O's Summary    22 Sep 2024 07:01  -  23 Sep 2024 07:00  --------------------------------------------------------  IN: 0 mL / OUT: 600 mL / NET: -600 mL      GENERAL: [ ] Cachexia  [ ]Alert  [ ]Oriented x   [ ]Lethargic  [ ]Unarousable  [ ]Verbal  [ ]Non-Verbal  Behavioral:   [ ] Anxiety  [ ] Delirium [ ] Agitation [ ] Other  HEENT:  [ ]Normal   [ ]Dry mouth   [ ]ET Tube/Trach  [ ]Oral lesions  PULMONARY:   [ ]Clear [ ]Tachypnea  [ ]Audible excessive secretions   [ ]Rhonchi        [ ]Right [ ]Left [ ]Bilateral  [ ]Crackles        [ ]Right [ ]Left [ ]Bilateral  [ ]Wheezing     [ ]Right [ ]Left [ ]Bilateral  [ ]Diminished BS [ ]Right [ ]Left [ ]Bilateral    CARDIOVASCULAR:    [ ]Regular [ ]Irregular [ ]Tachy  [ ]Christiano [ ]Murmur [ ]Other  GASTROINTESTINAL:  [ ]Soft  [ ]Distended   [ ]+BS  [ ]Non tender [ ]Tender  [ ]Other [ ]PEG [ ]OGT/ NGT   Last BM:    GENITOURINARY:  [ ]Normal [ ] Incontinent   [ ]Oliguria/Anuria   [ ]Chandler  MUSCULOSKELETAL:   [ ]Normal   [ ]Weakness  [ ]Bed/Wheelchair bound [ ]Edema  NEUROLOGIC:   [ ]No focal deficits  [ ] Cognitive impairment  [ ] Dysphagia [ ]Dysarthria [ ] Paresis [ ]Other   SKIN:   [ ]Normal  [ ]Rash  [ ]Other  [ ]Pressure ulcer(s)  [ ]y [ ]n  Present on admission      CRITICAL CARE:  [ ] Shock Present  [ ]Septic [ ]Cardiogenic [ ]Neurologic [ ]Hypovolemic  [ ]  Vasopressors [ ]  Inotropes   [ ] Respiratory failure present [ ] Mechanical Ventilation [ ] Non-invasive ventilatory support [ ] High-Flow  [ ] Acute  [ ] Chronic [ ] Hypoxic  [ ] Hypercarbic [ ] Other  [ ] Other organ failure     LABS:            RADIOLOGY & ADDITIONAL STUDIES:    PROTEIN CALORIE MALNUTRITION: [ ] mild [ ] moderate [ ] severe  [ ] underweight [ ] morbid obesity    https://www.andeal.org/vault/2016/web/files/ONC/Table_Clinical%20Characteristics%20to%20Document%20Malnutrition-White%20JV%20et%20al%202012.pdf    Height (cm): 182.9 (09-07-24 @ 22:22), 182.9 (09-07-24 @ 10:27), 182.9 (08-05-24 @ 09:41)  Weight (kg): 76.4 (09-07-24 @ 22:22), 74.8 (09-07-24 @ 10:27), 70.3 (08-05-24 @ 09:41)  BMI (kg/m2): 22.8 (09-07-24 @ 22:22), 22.4 (09-07-24 @ 10:27), 21 (08-05-24 @ 09:41)    [ ] PPSV2 < or = 30% [ ] significant weight loss [ ] poor nutritional intake [ ] anasarca   Artificial Nutrition [ ]     REFERRALS:   [ ]Chaplaincy  [ ] Hospice  [ ]Child Life  [ ]Social Work  [ ]Case management [ ]Holistic Therapy [ ] Physical Therapy [ ] Dietary   Goals of Care Document: PREMA Muse (09-13-24 @ 15:36)  Goals of Care Conversation:   Participants   · Participants  Family; Staff  · Spouse  Kenia  · Provider  Dr. Chavez of [facility]  ·   Adam Muse LCSW    Advance Directives   · Does patient have Advance Directive  Yes  · Indicate Type  Do Not Resuscitate (DNR); Medical Orders for Life-Sustaining Treatment (MOLST)  · Are any of the items on the chart  Yes  · Specify which ones are on chart  Do Not Resuscitate (DNR)  Medical Orders for Life-Sustaining Treatment (MOLST)  · Does Patient Have a Surrogate  Yes  · Surrogate's Name  spouse Kenia  · Does the Patient have a Court Appointed Guardian ([address])  No  · Caregiver:  no    Conversation Discussion   · Conversation  Diagnosis; Prognosis; Treatment Options; Chaplaincy Referral; Palliative Care Referral  · Conversation Details  GAP continues to follow this case for ongoing [facility] discussion and support. MOLINA and Dr. Chavez met with patient's spouse this AM with follow up GOC discussion this afternoon regarding next steps.    Team first reintroduced selves and roles in patient care. Kenia verbalized understanding and was receptive to visit this AM. Kenia notes that she is aware that the team is looking for a decision regarding next steps but discussed wishing to engage patient in this discussion further as she does not wish to make any decisions without patient participation if possible. Team validated this with Kenia today and discussed plan for follow up this afternoon to further assess goals moving forward. Kenia verbalized agreement.    Team revisited Kenia this afternoon and Kenia informed team that she was able to engage with patient regarding his status and wishes given worsening clinical status. Kenia states that patient was able to tell her x2 that he does not find his present state to be an acceptable quality of life and wishes to instead transition the focus of his care to comfort. Kenia notes that this was patient's decision, and team validated Kenia in advocating for patient's wishes and needs, and the selfless nature of being patient's voice in such an overwhelming and difficult time. Much emotional support and active listening provided. Team encouraged continued utilization of physical touch and life review today throughout Kenia's visit, connecting patient with home and family. Kenia verbalized agreement, and notes that as much as it is distressing to her to lose patient, she is advocating for what patient wants after a long and arduous healthcare journey. Team again validated this today and assured ongoing availability and support.     Patient with capped pressors and is already DNR/I. If patient is hemodynamically stable, patient to be evaluated for PCU tomorrow, 9/14. Patient to remain on CICU at present time for ongoing EOL care and management.    Kenia notes that patient is Zoroastrian.  called for last rites and prayer.     GAP will continue to follow this case.     I, Adam Muse, where applicable, am scribing for and the presence of Dr. Chavez the following sections PARTICIPANTS, ADVANCED DIRECTIVES, CONVERSATION DISCUSSION, WHAT MATTERS MOST TO PATIENTS AND FAMILY, TREATMENT GUIDELINES, DATE OF MOLST, AND TIME SPENT.    What Matters Most To Patient and Family   · What matters most to patient and family  patient comfort, time spent with family    Personal Advance Directives Treatment Guidelines:   Treatment Guidelines   · Decision Maker  Surrogate  · Treatment Guidelines  DNR; DNI    MOLST   · Completed  10-Sep-2024    Location of Discussion:   Time Spent on Advance Care Planning   Attending or TUSHAR Only.     I personally spent 46 minutes on advance care planning services with the patient. This time is separate and distinct from any other care management services provided on this date.    Location of Discussion   · Location of discussion  Face to face    Electronic Signatures for Addendum Section:   Amanda Chavez) (Signed Addendum 13-Sep-2024 16:27)    I personally performed the services described in the documentation, reviewed the documentation recorded by the LCSW (who acted as a scribe in addition to actively participating in the meeting) in my presence and it accurately and completely records my words and actions.    Electronic Signatures:  Adam Muse (MSW)  (Signed 13-Sep-2024 16:19)  	Authored: Goals of Care Conversation, Personal Advance Directives Treatment Guidelines, Location of Discussion      Last Updated: 13-Sep-2024 16:27 by Amanda Chavez)     GAP TEAM PALLIATIVE CARE UNIT PROGRESS NOTE:      [  ] Patient on hospice program.    INDICATION FOR PALLIATIVE CARE UNIT SERVICES/Interval HPI: 79-year-old male with a history of HFrEF on home dobutamine 7.5 with last EF less than 20%, CAD, lungs mass - SCC, and urinary retention presented transferred from [facility] for management of heart failure after having syncopal episode last night.  Patient also having reduced urine output despite 6 mg of Bumex twice a day.  At [facility] patient had a workup which was notable for sodium of 124, creatinine to 4.6 from his baseline of approximately 2-2.1, proBNP 5220, lactate 1.9.  He was transferred here for further evaluation and  heart failure management, admitted to CICU. Patient unable to be weaned off vasopressors and not responding to diuretics.  After discussions with wife and patient, patient transitioned to comfort care.   This morning BP 71/48 and T 96F.   Required IV Dialudid 0.5mg x1 and IV ativan 1mg x3 in last 24hrs 7am-7am.     DNR on chart: DNI  DNI      Allergies    No Known Allergies    Intolerances    atorvastatin (Muscle Pain (Severe))  MEDICATIONS  (STANDING):  chlorhexidine 2% Cloths 1 Application(s) Topical daily  HYDROmorphone Infusion 0.2 mG/Hr (0.2 mL/Hr) IV Continuous <Continuous>  LORazepam   Injectable 1 milliGRAM(s) IV Push every 4 hours    MEDICATIONS  (PRN):  acetaminophen  Suppository .. 650 milliGRAM(s) Rectal every 6 hours PRN Temp greater or equal to 38C (100.4F), Mild Pain (1 - 3)  bisacodyl Suppository 10 milliGRAM(s) Rectal daily PRN Constipation  glycopyrrolate Injectable 0.4 milliGRAM(s) IV Push every 6 hours PRN Secretions  HYDROmorphone  Injectable 0.5 milliGRAM(s) IV Push every 1 hour PRN Severe Pain (7 - 10)  HYDROmorphone  Injectable 0.5 milliGRAM(s) IV Push every 1 hour PRN Dyspnea  HYDROmorphone  Injectable 0.2 milliGRAM(s) IV Push every 1 hour PRN Moderate Pain (4 - 6)  lidocaine 2% Gel 1 Application(s) Topical four times a day PRN Discomfort  LORazepam   Injectable 1 milliGRAM(s) IV Push every 1 hour PRN Anxiety/Agitation    ITEMS UNCHECKED ARE NOT PRESENT    PRESENT SYMPTOMS: [ x]Unable to self-report  [x ]PAINADs [x ]RDOS  Source if other than patient:  [ ]Family   [ ]Team     Pain: [ ] yes [ ] no  QOL impact -   Location -                    Aggravating factors -  Quality -  Radiation -  Timing-  Severity (0-10 scale):  Minimal acceptable level (0-10 scale):     PAINAD Score:  http://[email or web address]/cog/painad.pdf (Ctrl +  left click to view)    Dyspnea:                           [ ]Mild [ ]Moderate [ ]Severe    RDOS:  0 to 2  minimal or no respiratory distress   3  mild distress  4 to 6 moderate distress  >7 severe distress  https://[email or web address]/handouts/hen/Respiratory_Distress_Observation_Scale.pdf (Ctrl +  left click to view)    Anxiety:                             [ ]Mild [ ]Moderate [ ]Severe  Fatigue:                             [ ]Mild [ ]Moderate [ ]Severe  Nausea:                             [ ]Mild [ ]Moderate [ ]Severe  Loss of appetite:              [ ]Mild [ ]Moderate [ ]Severe  Constipation:                    [ ]Mild [ ]Moderate [ ]Severe    Other Symptoms:  [ ]All other review of systems negative - unable to assess 2/2 lethargy    Palliative Performance Status Version 2:         %  http://npcrc.org/files/news/palliative_performance_scale_ppsv2.pdf    PHYSICAL EXAM:   Vital Signs Last 24 Hrs  T(C): 35.6 (23 Sep 2024 06:45), Max: 35.6 (23 Sep 2024 06:45)  T(F): 96 (23 Sep 2024 06:45), Max: 96 (23 Sep 2024 06:45)  HR: 62 (23 Sep 2024 06:45) (62 - 62)  BP: 71/48 (23 Sep 2024 06:45) (71/48 - 71/48)  BP(mean): --  RR: 16 (23 Sep 2024 06:45) (16 - 16)  SpO2: 83% (23 Sep 2024 06:45) (83% - 83%)    Parameters below as of 23 Sep 2024 06:45  Patient On (Oxygen Delivery Method): room air     I&O's Summary    22 Sep 2024 07:01  -  23 Sep 2024 07:00  --------------------------------------------------------  IN: 0 mL / OUT: 600 mL / NET: -600 mL      GENERAL: [ ] Cachexia  [ ]Alert  [ ]Oriented x   [ ]Lethargic  [ ]Unarousable  [ ]Verbal  [ ]Non-Verbal  Behavioral:   [ ] Anxiety  [ ] Delirium [ ] Agitation [ ] Other  HEENT:  [ ]Normal   [ ]Dry mouth   [ ]ET Tube/Trach  [ ]Oral lesions  PULMONARY:   [ ]Clear [ ]Tachypnea  [ ]Audible excessive secretions   [ ]Rhonchi        [ ]Right [ ]Left [ ]Bilateral  [ ]Crackles        [ ]Right [ ]Left [ ]Bilateral  [ ]Wheezing     [ ]Right [ ]Left [ ]Bilateral  [ ]Diminished BS [ ]Right [ ]Left [ ]Bilateral    CARDIOVASCULAR:    [ ]Regular [ ]Irregular [ ]Tachy  [ ]Christiano [ ]Murmur [ ]Other  GASTROINTESTINAL:  [ ]Soft  [ ]Distended   [ ]+BS  [ ]Non tender [ ]Tender  [ ]Other [ ]PEG [ ]OGT/ NGT   Last BM:    GENITOURINARY:  [ ]Normal [ ] Incontinent   [ ]Oliguria/Anuria   [ ]Chandler  MUSCULOSKELETAL:   [ ]Normal   [ ]Weakness  [ ]Bed/Wheelchair bound [ ]Edema  NEUROLOGIC:   [ ]No focal deficits  [ ] Cognitive impairment  [ ] Dysphagia [ ]Dysarthria [ ] Paresis [ ]Other   SKIN:   [ ]Normal  [ ]Rash  [ ]Other  [ ]Pressure ulcer(s)  [ ]y [ ]n  Present on admission      CRITICAL CARE:  [ ] Shock Present  [ ]Septic [ ]Cardiogenic [ ]Neurologic [ ]Hypovolemic  [ ]  Vasopressors [ ]  Inotropes   [ ] Respiratory failure present [ ] Mechanical Ventilation [ ] Non-invasive ventilatory support [ ] High-Flow  [ ] Acute  [ ] Chronic [ ] Hypoxic  [ ] Hypercarbic [ ] Other  [ ] Other organ failure     LABS:            RADIOLOGY & ADDITIONAL STUDIES:    PROTEIN CALORIE MALNUTRITION: [ ] mild [ ] moderate [ ] severe  [ ] underweight [ ] morbid obesity    https://www.andeal.org/vault/3227/web/files/ONC/Table_Clinical%20Characteristics%20to%20Document%20Malnutrition-White%20JV%20et%20al%880531.pdf    Height (cm): 182.9 (09-07-24 @ 22:22), 182.9 (09-07-24 @ 10:27), 182.9 (08-05-24 @ 09:41)  Weight (kg): 76.4 (09-07-24 @ 22:22), 74.8 (09-07-24 @ 10:27), 70.3 (08-05-24 @ 09:41)  BMI (kg/m2): 22.8 (09-07-24 @ 22:22), 22.4 (09-07-24 @ 10:27), 21 (08-05-24 @ 09:41)    [ ] PPSV2 < or = 30% [ ] significant weight loss [ ] poor nutritional intake [ ] anasarca   Artificial Nutrition [ ]     REFERRALS:   [ ]Chaplaincy  [ ] Hospice  [ ]Child Life  [ ]Social Work  [ ]Case management [ ]Holistic Therapy [ ] Physical Therapy [ ] Dietary   Goals of Care Document: PREMA Muse (09-13-24 @ 15:36)  Goals of Care Conversation:   Participants   · Participants  Family; Staff  · Spouse  Kenia  · Provider  Dr. Chavez of [facility]  ·   Adam Muse LCSW    Advance Directives   · Does patient have Advance Directive  Yes  · Indicate Type  Do Not Resuscitate (DNR); Medical Orders for Life-Sustaining Treatment (MOLST)  · Are any of the items on the chart  Yes  · Specify which ones are on chart  Do Not Resuscitate (DNR)  Medical Orders for Life-Sustaining Treatment (MOLST)  · Does Patient Have a Surrogate  Yes  · Surrogate's Name  spouse Kenia  · Does the Patient have a Court Appointed Guardian ([address])  No  · Caregiver:  no    Conversation Discussion   · Conversation  Diagnosis; Prognosis; Treatment Options; Chaplaincy Referral; Palliative Care Referral  · Conversation Details  GAP continues to follow this case for ongoing [facility] discussion and support. GENNAROW and Dr. Chavez met with patient's spouse this AM with follow up GOC discussion this afternoon regarding next steps.    Team first reintroduced selves and roles in patient care. Kenia verbalized understanding and was receptive to visit this AM. Kenia notes that she is aware that the team is looking for a decision regarding next steps but discussed wishing to engage patient in this discussion further as she does not wish to make any decisions without patient participation if possible. Team validated this with Kenia today and discussed plan for follow up this afternoon to further assess goals moving forward. Kenia verbalized agreement.    Team revisited Kenia this afternoon and Kenia informed team that she was able to engage with patient regarding his status and wishes given worsening clinical status. Kenia states that patient was able to tell her x2 that he does not find his present state to be an acceptable quality of life and wishes to instead transition the focus of his care to comfort. Kenia notes that this was patient's decision, and team validated Kenia in advocating for patient's wishes and needs, and the selfless nature of being patient's voice in such an overwhelming and difficult time. Much emotional support and active listening provided. Team encouraged continued utilization of physical touch and life review today throughout Kenia's visit, connecting patient with home and family. Kenia verbalized agreement, and notes that as much as it is distressing to her to lose patient, she is advocating for what patient wants after a long and arduous healthcare journey. Team again validated this today and assured ongoing availability and support.     Patient with capped pressors and is already DNR/I. If patient is hemodynamically stable, patient to be evaluated for PCU tomorrow, 9/14. Patient to remain on CICU at present time for ongoing EOL care and management.    Kenia notes that patient is Taoism.  called for last rites and prayer.     GAP will continue to follow this case.     I, Adam Muse, where applicable, am scribing for and the presence of Dr. Chavez the following sections PARTICIPANTS, ADVANCED DIRECTIVES, CONVERSATION DISCUSSION, WHAT MATTERS MOST TO PATIENTS AND FAMILY, TREATMENT GUIDELINES, DATE OF MOLST, AND TIME SPENT.    What Matters Most To Patient and Family   · What matters most to patient and family  patient comfort, time spent with family    Personal Advance Directives Treatment Guidelines:   Treatment Guidelines   · Decision Maker  Surrogate  · Treatment Guidelines  DNR; DNI    MOLST   · Completed  10-Sep-2024    Location of Discussion:   Time Spent on Advance Care Planning   Attending or TUSHAR Only.     I personally spent 46 minutes on advance care planning services with the patient. This time is separate and distinct from any other care management services provided on this date.    Location of Discussion   · Location of discussion  Face to face    Electronic Signatures for Addendum Section:   Amanda Chavez) (Signed Addendum 13-Sep-2024 16:27)    I personally performed the services described in the documentation, reviewed the documentation recorded by the LCSW (who acted as a scribe in addition to actively participating in the meeting) in my presence and it accurately and completely records my words and actions.    Electronic Signatures:  [facility])  (Signed 13-Sep-2024 16:19)  	Authored: Goals of Care Conversation, Personal Advance Directives Treatment Guidelines, Location of Discussion      Last Updated: 13-Sep-2024 16:27 by Amanda Chavez)     GAP TEAM PALLIATIVE CARE UNIT PROGRESS NOTE:      [  ] Patient on hospice program.    INDICATION FOR PALLIATIVE CARE UNIT SERVICES/Interval HPI: 79-year-old male with a history of HFrEF on home dobutamine 7.5 with last EF less than 20%, CAD, lungs mass - SCC, and urinary retention presented transferred from [facility] for management of heart failure after having syncopal episode last night.  Patient also having reduced urine output despite 6 mg of Bumex twice a day.  At [facility] patient had a workup which was notable for sodium of 124, creatinine to 4.6 from his baseline of approximately 2-2.1, proBNP 5220, lactate 1.9.  He was transferred here for further evaluation and  heart failure management, admitted to CICU. Patient unable to be weaned off vasopressors and not responding to diuretics.  After discussions with wife and patient, patient transitioned to comfort care.   This morning BP 71/48 and T 96F.   Required IV Dialudid 0.5mg x1 and IV ativan 1mg x3 in last 24hrs 7am-7am.     DNR on chart: DNI  DNI      Allergies    No Known Allergies    Intolerances    atorvastatin (Muscle Pain (Severe))  MEDICATIONS  (STANDING):  chlorhexidine 2% Cloths 1 Application(s) Topical daily  HYDROmorphone Infusion 0.2 mG/Hr (0.2 mL/Hr) IV Continuous <Continuous>  LORazepam   Injectable 1 milliGRAM(s) IV Push every 4 hours    MEDICATIONS  (PRN):  acetaminophen  Suppository .. 650 milliGRAM(s) Rectal every 6 hours PRN Temp greater or equal to 38C (100.4F), Mild Pain (1 - 3)  bisacodyl Suppository 10 milliGRAM(s) Rectal daily PRN Constipation  glycopyrrolate Injectable 0.4 milliGRAM(s) IV Push every 6 hours PRN Secretions  HYDROmorphone  Injectable 0.5 milliGRAM(s) IV Push every 1 hour PRN Severe Pain (7 - 10)  HYDROmorphone  Injectable 0.5 milliGRAM(s) IV Push every 1 hour PRN Dyspnea  HYDROmorphone  Injectable 0.2 milliGRAM(s) IV Push every 1 hour PRN Moderate Pain (4 - 6)  lidocaine 2% Gel 1 Application(s) Topical four times a day PRN Discomfort  LORazepam   Injectable 1 milliGRAM(s) IV Push every 1 hour PRN Anxiety/Agitation    ITEMS UNCHECKED ARE NOT PRESENT    PRESENT SYMPTOMS: [ x]Unable to self-report  [x ]PAINADs [x ]RDOS  Source if other than patient:  [ ]Family   [ ]Team     Pain: [ ] yes [ ] no  QOL impact -   Location -                    Aggravating factors -  Quality -  Radiation -  Timing-  Severity (0-10 scale):  Minimal acceptable level (0-10 scale):     PAINAD Score:  [email or web address]/painad.pdf (Ctrl +  left click to view)    Dyspnea:                           [ ]Mild [ ]Moderate [ ]Severe    RDOS:  0 to 2  minimal or no respiratory distress   3  mild distress  4 to 6 moderate distress  >7 severe distress  https://homecareinformation.net/handouts/hen/Respiratory_Distress_Observation_Scale.pdf (Ctrl +  left click to view)    Anxiety:                             [ ]Mild [ ]Moderate [ ]Severe  Fatigue:                             [ ]Mild [ ]Moderate [ ]Severe  Nausea:                             [ ]Mild [ ]Moderate [ ]Severe  Loss of appetite:              [ ]Mild [ ]Moderate [ ]Severe  Constipation:                    [ ]Mild [ ]Moderate [ ]Severe    Other Symptoms:  [ ]All other review of systems negative - unable to assess 2/2 lethargy    Palliative Performance Status Version 2:     10    %  http://npcrc.org/files/news/palliative_performance_scale_ppsv2.pdf    PHYSICAL EXAM:   Vital Signs Last 24 Hrs  T(C): 35.6 (23 Sep 2024 06:45), Max: 35.6 (23 Sep 2024 06:45)  T(F): 96 (23 Sep 2024 06:45), Max: 96 (23 Sep 2024 06:45)  HR: 62 (23 Sep 2024 06:45) (62 - 62)  BP: 71/48 (23 Sep 2024 06:45) (71/48 - 71/48)  BP(mean): --  RR: 16 (23 Sep 2024 06:45) (16 - 16)  SpO2: 83% (23 Sep 2024 06:45) (83% - 83%)    Parameters below as of 23 Sep 2024 06:45  Patient On (Oxygen Delivery Method): room air     I&O's Summary    22 Sep 2024 07:01  -  23 Sep 2024 07:00  --------------------------------------------------------  IN: 0 mL / OUT: 600 mL / NET: -600 mL      GENERAL: [x ] Cachexia  [ ]Alert  [ ]Oriented x   [ x]Lethargic  [ ]Unarousable  [ ]Verbal  [ ]Non-Verbal  Behavioral:   [ ] Anxiety  [ ] Delirium [ ] Agitation [ ] Other  HEENT:  [ ]Normal   [x ]Dry mouth   [ ]ET Tube/Trach  [ ]Oral lesions  PULMONARY:   [x ]Clear [ ]Tachypnea  [ ]Audible excessive secretions   [ ]Rhonchi        [ ]Right [ ]Left [ ]Bilateral  [ ]Crackles        [ ]Right [ ]Left [ ]Bilateral  [ ]Wheezing     [ ]Right [ ]Left [ ]Bilateral  [ ]Diminished BS [ ]Right [ ]Left [ ]Bilateral    CARDIOVASCULAR:    [ x]Regular [ ]Irregular [ ]Tachy  [ ]Christiano [ ]Murmur [ ]Other  GASTROINTESTINAL:  [x ]Soft  [ ]Distended   [x ]+BS  [ x]Non tender [ ]Tender  [ ]Other [ ]PEG [ ]OGT/ NGT   Last BM:  9/15  GENITOURINARY:  [ ]Normal [ ] Incontinent   [ ]Oliguria/Anuria   [x ]Chandler  MUSCULOSKELETAL:   [ ]Normal   [ ]Weakness  [ ]Bed/Wheelchair bound [ ]Edema  NEUROLOGIC:   [ ]No focal deficits  [x ] Cognitive impairment  [ ] Dysphagia [ ]Dysarthria [ ] Paresis [ ]Other   SKIN:   [ ]Normal  [ ]Rash  [X]Other Please seen RN Documentation which I have reviewed, sacrum, penis   [ ]Pressure ulcer(s)  [ ]y [X]n  Present on admission      CRITICAL CARE:  [ ] Shock Present  [ ]Septic [ ]Cardiogenic [ ]Neurologic [ ]Hypovolemic  [ ]  Vasopressors [ ]  Inotropes   [ ] Respiratory failure present [ ] Mechanical Ventilation [ ] Non-invasive ventilatory support [ ] High-Flow  [ ] Acute  [ ] Chronic [ ] Hypoxic  [ ] Hypercarbic [ ] Other  [ ] Other organ failure     LABS:            RADIOLOGY & ADDITIONAL STUDIES:    PROTEIN CALORIE MALNUTRITION: [ ] mild [ ] moderate [ ] severe  [ ] underweight [ ] morbid obesity    https://www.andeal.org/vault/8347/web/files/ONC/Table_Clinical%20Characteristics%20to%20Document%20Malnutrition-White%20JV%20et%20al%730809.pdf    Height (cm): 182.9 (09-07-24 @ 22:22), 182.9 (09-07-24 @ 10:27), 182.9 (08-05-24 @ 09:41)  Weight (kg): 76.4 (09-07-24 @ 22:22), 74.8 (09-07-24 @ 10:27), 70.3 (08-05-24 @ 09:41)  BMI (kg/m2): 22.8 (09-07-24 @ 22:22), 22.4 (09-07-24 @ 10:27), 21 (08-05-24 @ 09:41)    [ x] PPSV2 < or = 30% [ ] significant weight loss [ ] poor nutritional intake [ ] anasarca   Artificial Nutrition [ ]     REFERRALS:   [ ]Chaplaincy  [ ] Hospice  [ ]Child Life  [ ]Social Work  [ ]Case management [ ]Holistic Therapy [ ] Physical Therapy [ ] Dietary   Goals of Care Document: PREMA Muse (09-13-24 @ 15:36)  Goals of Care Conversation:   Participants   · Participants  Family; Staff  · Spouse  Kenia  · Provider  Dr. Chavez of [facility]  ·   Adam Muse LCSW    Advance Directives   · Does patient have Advance Directive  Yes  · Indicate Type  Do Not Resuscitate (DNR); Medical Orders for Life-Sustaining Treatment (MOLST)  · Are any of the items on the chart  Yes  · Specify which ones are on chart  Do Not Resuscitate (DNR)  Medical Orders for Life-Sustaining Treatment (MOLST)  · Does Patient Have a Surrogate  Yes  · Surrogate's Name  spouse Kenia  · Does the Patient have a Court Appointed Guardian ([address])  No  · Caregiver:  no    Conversation Discussion   · Conversation  Diagnosis; Prognosis; Treatment Options; Chaplaincy Referral; Palliative Care Referral  · Conversation Details  GAP continues to follow this case for ongoing GOC discussion and support. MOLINA and Dr. Chavez met with patient's spouse this AM with follow up GOC discussion this afternoon regarding next steps.    Team first reintroduced selves and roles in patient care. Kenia verbalized understanding and was receptive to visit this AM. Kenia notes that she is aware that the team is looking for a decision regarding next steps but discussed wishing to engage patient in this discussion further as she does not wish to make any decisions without patient participation if possible. Team validated this with Kenia today and discussed plan for follow up this afternoon to further assess goals moving forward. Kenia verbalized agreement.    Team revisited Kenia this afternoon and Kenia informed team that she was able to engage with patient regarding his status and wishes given worsening clinical status. Kenia states that patient was able to tell her x2 that he does not find his present state to be an acceptable quality of life and wishes to instead transition the focus of his care to comfort. Kenia notes that this was patient's decision, and team validated Kenia in advocating for patient's wishes and needs, and the selfless nature of being patient's voice in such an overwhelming and difficult time. Much emotional support and active listening provided. Team encouraged continued utilization of physical touch and life review today throughout Kenia's visit, connecting patient with home and family. Kenia verbalized agreement, and notes that as much as it is distressing to her to lose patient, she is advocating for what patient wants after a long and arduous healthcare journey. Team again validated this today and assured ongoing availability and support.     Patient with capped pressors and is already DNR/I. If patient is hemodynamically stable, patient to be evaluated for PCU tomorrow, 9/14. Patient to remain on CICU at present time for ongoing EOL care and management.    Kenia notes that patient is Cheondoism.  called for last rites and prayer.     GAP will continue to follow this case.     Adam TIJERINA, where applicable, am scribing for and the presence of Dr. Chavez the following sections PARTICIPANTS, ADVANCED DIRECTIVES, CONVERSATION DISCUSSION, WHAT MATTERS MOST TO PATIENTS AND FAMILY, TREATMENT GUIDELINES, DATE OF MOLST, AND TIME SPENT.    What Matters Most To Patient and Family   · What matters most to patient and family  patient comfort, time spent with family    Personal Advance Directives Treatment Guidelines:   Treatment Guidelines   · Decision Maker  Surrogate  · Treatment Guidelines  DNR; DNI    MOLST   · Completed  10-Sep-2024    Location of Discussion:   Time Spent on Advance Care Planning   Attending or TUSHAR Only.     I personally spent 46 minutes on advance care planning services with the patient. This time is separate and distinct from any other care management services provided on this date.    Location of Discussion   · Location of discussion  Face to face    Electronic Signatures for Addendum Section:   Amanda Chavez) (Signed Addendum 13-Sep-2024 16:27)    I personally performed the services described in the documentation, reviewed the documentation recorded by the LCSW (who acted as a scribe in addition to actively participating in the meeting) in my presence and it accurately and completely records my words and actions.    Electronic Signatures:  Adam Muse ([facility])  (Signed 13-Sep-2024 16:19)  	Authored: Goals of Care Conversation, Personal Advance Directives Treatment Guidelines, Location of Discussion      Last Updated: 13-Sep-2024 16:27 by Amanda Chavez)     GAP TEAM PALLIATIVE CARE UNIT PROGRESS NOTE:      [  ] Patient on hospice program.    INDICATION FOR PALLIATIVE CARE UNIT SERVICES/Interval HPI: 79-year-old male with a history of HFrEF on home dobutamine 7.5 with last EF less than 20%, CAD, lungs mass - SCC, and urinary retention presented transferred from [facility] for management of heart failure after having syncopal episode last night.  Patient also having reduced urine output despite 6 mg of Bumex twice a day.  At [facility] patient had a workup which was notable for sodium of 124, creatinine to 4.6 from his baseline of approximately 2-2.1, proBNP 5220, lactate 1.9.  He was transferred here for further evaluation and  heart failure management, admitted to CICU. Patient unable to be weaned off vasopressors and not responding to diuretics.  After discussions with wife and patient, patient transitioned to comfort care.   This morning BP 71/48 and T 96F.   Required IV Dialudid 0.5mg x1 and IV ativan 1mg x3 in last 24hrs 7am-7am.     DNR on chart:  Yes  DNI      Allergies    No Known Allergies    Intolerances    atorvastatin (Muscle Pain (Severe))  MEDICATIONS  (STANDING):  chlorhexidine 2% Cloths 1 Application(s) Topical daily  HYDROmorphone Infusion 0.2 mG/Hr (0.2 mL/Hr) IV Continuous <Continuous>  LORazepam   Injectable 1 milliGRAM(s) IV Push every 4 hours    MEDICATIONS  (PRN):  acetaminophen  Suppository .. 650 milliGRAM(s) Rectal every 6 hours PRN Temp greater or equal to 38C (100.4F), Mild Pain (1 - 3)  bisacodyl Suppository 10 milliGRAM(s) Rectal daily PRN Constipation  glycopyrrolate Injectable 0.4 milliGRAM(s) IV Push every 6 hours PRN Secretions  HYDROmorphone  Injectable 0.5 milliGRAM(s) IV Push every 1 hour PRN Severe Pain (7 - 10)  HYDROmorphone  Injectable 0.5 milliGRAM(s) IV Push every 1 hour PRN Dyspnea  HYDROmorphone  Injectable 0.2 milliGRAM(s) IV Push every 1 hour PRN Moderate Pain (4 - 6)  lidocaine 2% Gel 1 Application(s) Topical four times a day PRN Discomfort  LORazepam   Injectable 1 milliGRAM(s) IV Push every 1 hour PRN Anxiety/Agitation    ITEMS UNCHECKED ARE NOT PRESENT    PRESENT SYMPTOMS: [ x]Unable to self-report  [x ]PAINADs [x ]RDOS  Source if other than patient:  [ ]Family   [ ]Team     Pain: [ ] yes [ ] no  QOL impact -   Location -                    Aggravating factors -  Quality -  Radiation -  Timing-  Severity (0-10 scale):  Minimal acceptable level (0-10 scale):     Other Symptoms:  [ ]All other review of systems negative - unable to assess 2/2 lethargy    Palliative Performance Status Version 2:     10    %  http://npcrc.org/files/news/palliative_performance_scale_ppsv2.pdf    PHYSICAL EXAM:   Vital Signs Last 24 Hrs  T(C): 35.6 (23 Sep 2024 06:45), Max: 35.6 (23 Sep 2024 06:45)  T(F): 96 (23 Sep 2024 06:45), Max: 96 (23 Sep 2024 06:45)  HR: 62 (23 Sep 2024 06:45) (62 - 62)  BP: 71/48 (23 Sep 2024 06:45) (71/48 - 71/48)  BP(mean): --  RR: 16 (23 Sep 2024 06:45) (16 - 16)  SpO2: 83% (23 Sep 2024 06:45) (83% - 83%)    Parameters below as of 23 Sep 2024 06:45  Patient On (Oxygen Delivery Method): room air     I&O's Summary    22 Sep 2024 07:01  -  23 Sep 2024 07:00  --------------------------------------------------------  IN: 0 mL / OUT: 600 mL / NET: -600 mL      GENERAL: [x ] Cachexia  [ ]Alert  [ ]Oriented x   [ x]Lethargic  [ ]Unarousable  [ ]Verbal  [ x]Non-Verbal  Behavioral:   [ ] Anxiety  [ ] Delirium [ ] Agitation [ ] Other  HEENT:  [ ]Normal   [x ]Dry mouth   [ ]ET Tube/Trach  [ ]Oral lesions  PULMONARY:   [x ]Clear [ ]Tachypnea  [ ]Audible excessive secretions   [ ]Rhonchi        [ ]Right [ ]Left [ ]Bilateral  [ ]Crackles        [ ]Right [ ]Left [ ]Bilateral  [ ]Wheezing     [ ]Right [ ]Left [ ]Bilateral  [ ]Diminished BS [ ]Right [ ]Left [ ]Bilateral    CARDIOVASCULAR:    [ x]Regular [ ]Irregular [ ]Tachy  [ ]Christiano [ ]Murmur [ ]Other  GASTROINTESTINAL:  [x ]Soft  [ ]Distended   [x ]+BS  [x]Non tender [ ]Tender  [ ]Other [ ]PEG [ ]OGT/ NGT   Last BM:  9/15  GENITOURINARY:  [x ]Normal [ ] Incontinent   [x ]Oliguria/Anuria   [x ]Chandler  MUSCULOSKELETAL:   [ ]Normal   [ x]Weakness  [ x]Bed/Wheelchair bound [ ]Edema  NEUROLOGIC:   [ ]No focal deficits  [x ] Cognitive impairment  [ ] Dysphagia [ ]Dysarthria [ ] Paresis [ ]Other   SKIN:   [ ]Normal  [ ]Rash  [X]Other Please seen RN Documentation which I have reviewed, sacrum, penis   [ ]Pressure ulcer(s)  [ ]y [X]n  Present on admission      CRITICAL CARE:  [ ] Shock Present  [ ]Septic [ ]Cardiogenic [ ]Neurologic [ ]Hypovolemic  [ ]  Vasopressors [ ]  Inotropes   [ ] Respiratory failure present [ ] Mechanical Ventilation [ ] Non-invasive ventilatory support [ ] High-Flow  [ ] Acute  [ ] Chronic [ ] Hypoxic  [ ] Hypercarbic [ ] Other  [ ] Other organ failure kidneys, heart    LABS:  None new.           RADIOLOGY & ADDITIONAL STUDIES: None new.     PROTEIN CALORIE MALNUTRITION: [ ] mild [ ] moderate [x ] severe  [ ] underweight [ ] morbid obesity    https://www.andeal.org/vault/6920/web/files/ONC/Table_Clinical%20Characteristics%20to%20Document%20Malnutrition-White%20JV%20et%20al%310387.pdf    Height (cm): 182.9 (09-07-24 @ 22:22), 182.9 (09-07-24 @ 10:27), 182.9 (08-05-24 @ 09:41)  Weight (kg): 76.4 (09-07-24 @ 22:22), 74.8 (09-07-24 @ 10:27), 70.3 (08-05-24 @ 09:41)  BMI (kg/m2): 22.8 (09-07-24 @ 22:22), 22.4 (09-07-24 @ 10:27), 21 (08-05-24 @ 09:41)    [ x] PPSV2 < or = 30% [x ] significant weight loss [x ] poor nutritional intake [ ] anasarca   Artificial Nutrition [ ]     REFERRALS:   [ ]Chaplaincy  [ ] Hospice  [ ]Child Life  [ ]Social Work  [ ]Case management [ ]Holistic Therapy [ ] Physical Therapy [ ] Dietary   Goals of Care Document: PREMA Muse (09-13-24 @ 15:36)  Goals of Care Conversation:   Participants   · Participants  Family; Staff  · Spouse  Kenia  · Provider  Dr. Chavez of [facility]  ·   Adam Muse LCSW    Advance Directives   · Does patient have Advance Directive  Yes  · Indicate Type  Do Not Resuscitate (DNR); Medical Orders for Life-Sustaining Treatment (MOLST)  · Are any of the items on the chart  Yes  · Specify which ones are on chart  Do Not Resuscitate (DNR)  Medical Orders for Life-Sustaining Treatment (MOLST)  · Does Patient Have a Surrogate  Yes  · Surrogate's Name  spouse Kenia  · Does the Patient have a Court Appointed Guardian ([id number])  No  · Caregiver:  no    Conversation Discussion   · Conversation  Diagnosis; Prognosis; Treatment Options; Chaplaincy Referral; Palliative Care Referral  · Conversation Details  GAP continues to follow this case for ongoing [facility] discussion and support. MOLINA and Dr. Chavez met with patient's spouse this AM with follow up GOC discussion this afternoon regarding next steps.    Team first reintroduced selves and roles in patient care. Kenia verbalized understanding and was receptive to visit this AM. Kenia notes that she is aware that the team is looking for a decision regarding next steps but discussed wishing to engage patient in this discussion further as she does not wish to make any decisions without patient participation if possible. Team validated this with Kenia today and discussed plan for follow up this afternoon to further assess goals moving forward. Kenia verbalized agreement.    Team revisited Kenia this afternoon and Kenia informed team that she was able to engage with patient regarding his status and wishes given worsening clinical status. Kenia states that patient was able to tell her x2 that he does not find his present state to be an acceptable quality of life and wishes to instead transition the focus of his care to comfort. Kenia notes that this was patient's decision, and team validated Kenia in advocating for patient's wishes and needs, and the selfless nature of being patient's voice in such an overwhelming and difficult time. Much emotional support and active listening provided. Team encouraged continued utilization of physical touch and life review today throughout Kenia's visit, connecting patient with home and family. Kenia verbalized agreement, and notes that as much as it is distressing to her to lose patient, she is advocating for what patient wants after a long and arduous healthcare journey. Team again validated this today and assured ongoing availability and support.     Patient with capped pressors and is already DNR/I. If patient is hemodynamically stable, patient to be evaluated for PCU tomorrow, 9/14. Patient to remain on CICU at present time for ongoing EOL care and management.    Kenia notes that patient is Restorationist.  called for last rites and prayer.     GAP will continue to follow this case.     I, Adam Muse, where applicable, am scribing for and the presence of Dr. Chavez the following sections PARTICIPANTS, ADVANCED DIRECTIVES, CONVERSATION DISCUSSION, WHAT MATTERS MOST TO PATIENTS AND FAMILY, TREATMENT GUIDELINES, DATE OF MOLST, AND TIME SPENT.    What Matters Most To Patient and Family   · What matters most to patient and family  patient comfort, time spent with family    Personal Advance Directives Treatment Guidelines:   Treatment Guidelines   · Decision Maker  Surrogate  · Treatment Guidelines  DNR; DNI    MOLST   · Completed  10-Sep-2024    Location of Discussion:   Time Spent on Advance Care Planning   Attending or TUSHAR Only.     I personally spent 46 minutes on advance care planning services with the patient. This time is separate and distinct from any other care management services provided on this date.    Location of Discussion   · Location of discussion  Face to face    Electronic Signatures for Addendum Section:   Amanda Chavez) (Signed Addendum 13-Sep-2024 16:27)    I personally performed the services described in the documentation, reviewed the documentation recorded by the LCSW (who acted as a scribe in addition to actively participating in the meeting) in my presence and it accurately and completely records my words and actions.    Electronic Signatures:  [facility] MuseOK [facility] [place])  (Signed 13-Sep-2024 16:19)  	Authored: Goals of Care Conversation, Personal Advance Directives Treatment Guidelines, Location of Discussion      Last Updated: 13-Sep-2024 16:27 by Amanda Chavez)

## 2024-09-23 NOTE — PROGRESS NOTE ADULT - PROBLEM SELECTOR PLAN 2
likely 2/2 ADHF heart failure  prn use reviewed, as above     - stop Dilaudid ATC to 0.75mg IV q4hrs  - start IV dilaudid drip at 0.2mg/hr  - c/w Dilaudid 0.5 mg q1hr PRN for Dyspnea  - c/w O2 via NC as tolerated   - c/w Robinul 0.4mg IV QID

## 2024-09-23 NOTE — PROGRESS NOTE ADULT - ATTENDING COMMENTS
80 y/o male with hx HFrEF (20%) dobutamine dependent, admitted with cardiogenic shock, deemed not a candidate for advanced heart failure therapies.  Pt with kidney failure as well.  Transferred to PCU on vaso/dobutamine which was discontinued yesterday.        In the setting of parenteral controlled substance administration, clinical monitoring required for side effects such as respiratory depression, constipation and opioid induced neurotoxicity due to organ failure.  Transitioned to continuous infusion with PRNs for better control of symptoms.      Patient with imminent death diagnosis.  Family at bedside, updated as to current clinical condition and plan of care.  Educated as to what to expect, questions answered and emotional support provided.     Patient assessment and plan discussed on interdisciplinary team rounds today.

## 2024-09-23 NOTE — PROGRESS NOTE ADULT - PROBLEM SELECTOR PLAN 1
24hr PRN use reviewed 7AM to 7AM: IV dilaudid 0.5mg for dyspnea x1    - stop Dilaudid ATC to 0.75mg IV q4hrs  - start IV dilaudid drip at 0.2mg/hr  - IV Dilaudid 0.2 mg q1hr PRN for Moderate Pain   - IV Dilaudid 0.5 mg q1hr PRN for Severe Pain   - Bowel regimen while on opioids.  Monitor for constipation.

## 2024-09-23 NOTE — PROGRESS NOTE ADULT - ASSESSMENT
79-year-old male with a history of HFrEF on home dobutamine 7.5 with last EF less than 20%, CAD, lungs mass - SCC, and urinary retention presented transferred from [facility] for management of heart failure after having syncopal episode, unable to wean off pressors and inotropes.  Patient has since transitioned to comfort care and transferred to PCU. Now off vaso and

## 2024-09-23 NOTE — PROGRESS NOTE ADULT - PROBLEM SELECTOR PLAN 4
ischemic HFrEF (EF 20%) on home dobutamine 7.5 p/w ADHF.   Currently on capped inotrope/ pressor. s/p PICC placement  AM    - now off vaso and   - Continue NC oxygen for comfort  - hold off on removing Shiley due to patient's health acuity

## 2024-09-23 NOTE — PROGRESS NOTE ADULT - PROBLEM SELECTOR PLAN 3
Pt w/ chronic ho anxiety, unable to take PO, noted increasing anxiety/agitation, likely terminal agitation   24hr PRN use reviewed 7AM to 7AM: required ativan 1mg for anxiety x3.     - c/w IV ativan 1mg q4h ATC, no PRNs required after increasing ATC ativan   - c/w Lorazepam 1.0mg IV q1hour PRN for agitation/anxiety

## 2024-09-23 NOTE — CHART NOTE - NSCHARTNOTEFT_GEN_A_CORE
NUTRITION FOLLOW UP NOTE    PATIENT SEEN FOR: malnutrition initial follow up     SOURCE: [] Patient  [x] Current Medical Record  [] RN  [] Family/support person at bedside  [x] Patient unavailable/inappropriate  [] Other:    CHART REVIEWED/EVENTS NOTED.  [] No changes to nutrition care plan to note  [x] Nutrition Status:  - hx HF. Cardiogenic shock. Plan for comfort measures and PCU bed when available  - BRODY on CKD  - lung cancer    DIET ORDER:   Diet, Regular:   Soft and Bite Sized (SOFTBTSZ)  No Concentrated Phosphorus (24)    CURRENT DIET ORDER IS:  [] Appropriate:  [] Inadequate:  [x] Other: see recommendations     NUTRITION INTAKE/PROVISION:  [x] PO: Pt noted with poor PO intake per nursing flowsheets.   [] Enteral Nutrition:  [] Parenteral Nutrition:    ANTHROPOMETRICS:  Drug Dosing Weight  Height (cm): 182.9 (07 Sep 2024 22:22)  Weight (kg): 76.4 (07 Sep 2024 22:22)  BMI (kg/m2): 22.8 (07 Sep 2024 22:22)    Weights:   Daily Weight in k.9 (), 70.5 (), 72.8 (), 72.3 ()   Weight decreasing possible due to diuresis/fluid shifts vs bedscale discrepancies vs poor PO intake. RD to continue to monitor weight trends as able/available.      NUTRITIONALLY PERTINENT MEDICATIONS:  MEDICATIONS  (STANDING):  buMETAnide Injectable  DOBUTamine Infusion  vasopressin Infusion       NUTRITIONALLY PERTINENT LABS:   Na126 mmol/L<L> Glu 163 mg/dL<H> K+ 3.8 mmol/L Cr  4.27 mg/dL<H>  mg/dL<H>   Phos 7.4 mg/dL<H>    Alb 2.9 g/dL<L>    Chol 99 mg/dL LDL --    HDL 58 mg/dL Trig 47 mg/dL   ALT 29 U/L AST 61 U/L<H> Alkaline Phosphatase 211 U/L<H>  24 @ 22:57 a1c 5.8    A1C with Estimated Average Glucose Result: 5.8 % (24 @ 22:57)    NUTRITIONALLY PERTINENT MEDICATIONS/LABS:  [x] Reviewed  [x] Relevant notes on medications/labs:  - vasopressin @ 0.06 units/min for blood pressure support  - dobutamine for inotropic support   - IV bumex for diuresis   - phosphorus trending elevated    EDEMA:  [x] Reviewed  [] Relevant notes:    GI/ I&O:  [x] Reviewed  [] Relevant notes:  [] Other:    SKIN:   [x] No pressure injuries documented, per nursing flowsheet  [] Pressure injury previously noted  [] Change in pressure injury documentation:  [] Other:    ESTIMATED NEEDS:  [] No change:  [x] Updated:  Energy:  3380-5689 kcal/day (30-35 kcal/kg)  Protein:   g/day (1.2-1.5 g/kg)  Fluid:   ml/day or [x] defer to team  Based on: recent wt 69.9 kg ()    NUTRITION DIAGNOSIS:  [x] Prior Dx: Severe chronic malnutrition; increased nutrient needs   [] New Dx:    EDUCATION:  [] Yes:  [x] Not appropriate/warranted    NUTRITION CARE PLAN:  1. Would recommend continuing current No Concentrated Phosphorus restriction; however can consider Regular diet if within GOC/family/pt wishes.  2. [place] dietary preferences as stated by pt/family.    [] Achieved - Continue current nutrition intervention(s)  [] Current medical condition precludes nutrition intervention at this time.    MONITORING AND EVALUATION:   RD remains available upon request and will follow up per protocol.    aTmi Aviles RDN, CDN (Available via [employer] TEAMS)
Received message from Primary RN regarding patient exhibiting dyspnea, pain and is calling out from time to time. Primary RN states spoke with wife who acknowledges patients symptoms and would like to keep patient as comfortable as possible. Case discussed with Dr. Guevara and medication changes as listed below.    Today 7AM to 7PM (12hrs) patient used   Dilaudid 0.2mg q1hr moderate to severe pain X1  Dilaudid 0.2mg q1hr Dyspnea x2  Ativan 0.5mg q1hr Agitation x3    Recommendations  - Continue Dilaudid 0.2mg q1hr Moderate Pain  - Increase Dilaudid to 0.5mg q1hr PRN Severe Pain   - Increase Dilaudid to 0.5mg q1hr PRN Dyspnea  - Increase Ativan to 1mg q1hr PRN Anxiety  - Start Ativan 0.5mg q6hr    Patient case discussed with Dr. Turner who was directly involved in patient care and decision making.    This note is not finalized until reviewed and signed by Attending Physician Dr. Ismael Zuniga DO   Fellow - Hospice & Palliative Medicine
TRISTAN consulted to assist in GOC discussion in the setting of patient with advanced illness. LCSW and Dr. Chavez of [facility] met with patient and spouse Kenia at bedside today for supportive intervention.    Team first introduced and reintroduced selves and roles in patient care. Patient with some confusion and was eating at time of visit, unable to meaningfully participate in discussion today. Spouse Kenia verbalized understanding and was receptive to visit today. Team next inquired into patient status and family coping, and Kenia notes managing as well as possible. Kenia states that she is aware that patient with limited options and overall decline as well as patient's limited time and poor prognosis. Kenia states that her ultimate goal would be for patient to return home but notes that at present time, she is not interested in d/c planning with hospice services, as she wishes to prolong patient's life as much as possible and feels it is not yet time to transition the focus of care. Kenia states that she feels much uncertainty regarding where patient will go next and when as well as acknowledged wondering about the "what ifs" in care had things been done differently or earlier. Team validated this as well as the difficulty in waiting and watching for next steps and the lack of control over what is presently occurring. Kenia verbalized agreement and notes that patient is a fighter and has always been a fighter, and notes wishing to honor this. Team again validated this and inquired further into Kenia's support system today. Kenia notes that, during the illness processes of her mother and father, patient was her primary support and helped her navigate her parents' prolonged and complex medical issues. Kenia states that now, she is patient's primary support and notes being without substantial support herself, as the couple is without children and without significant extended family. Team provided much emotional support and active listening today and assured ongoing availability as an extra layer of support to Kenia throughout next steps. Kenia verbalized appreciation of support and visit today.    GAP will continue to follow this case.
Interim Note    Per RN staff, pt inappropriate for nutrition follow up at this time.    RD remains available.  Yancy Slaughter, MS, RD, CDN, CNSC, CDCES TEAMS
Palliative team following for GOC.   Pt lethargic and unable to provide history. Wife not present at bedside.   Will f/u with wife tomorrow regarding ongoing GOC.   Discussed with Dr. Mohamud.    Amanda Chavez MD  Geriatrics and Palliative Medicine Attending  [facility] pager: [phone]
TRISTAN and LCSW continue to follow this case. Patient remains pending PCU transfer at this time. LCSW met with spouse Kenia at bedside for supportive intervention today.    LCSW inquired into Kenia's coping and Kenia notes coping as well as possible. Kenia states that patient is having a better day today than yesterday and has been more alert, which has been a welcome change as compared to patient's lethargy yesterday. Kenia states that she and patient's nephew were at bedside yesterday doing some life review whilst patient was sleeping and discussed that patient informed them in the evening that he had heard their discussion and was comforted by the stories. Kenia states that whilst she does not wish to lose patient, she understands patient goals for comfort and will support patient throughout his EOL process. LCSW validated this today and provided much emotional support.    LCSW inquired into Kenia's self-care today and Kenia notes ensuring that she is eating and sleeping sufficiently, and notes much appreciation of care provided to patient during this admission, as it has provided her with assurance of patient's safety and care when she is not present at bedside. Kenia states that she wishes that patient would maintain his mental status better but understands trying to balance patient symptoms with good mentation, and LCSW validated this and discussed that this balance is a goal to be aimed for but cannot always be successfully achieved. Kenia verbalized understanding and agreement. LCSW encouraged continued ongoing self-care during this process and assured GAP and  support throughout as well. Kenia verbalized agreement and appreciation.    Patient remains pending PCU transfer, GAP will continue to follow this case.

## 2024-09-24 NOTE — PROGRESS NOTE ADULT - RESPIRATORY DISTRESS OBSERVATION: RESTLESSNESS
Occasional, slight movements
None
Occasional, slight movements

## 2024-09-24 NOTE — PROGRESS NOTE ADULT - [place] SYMPTOM ASSESSMENT: OTHER PROBLEM DESCRIPTION
The patient is unable to self-report.
Patient unable to participate
The patient is unable to self-report.
Patient unable to participate
The patient is unable to self-report.

## 2024-09-24 NOTE — PROVIDER CONTACT NOTE (OTHER) - RECOMMENDATIONS
Patient pronounced dead at 2020, spouse at bedside, no autopsy requested, will let us know if more family members will be coming.

## 2024-09-24 NOTE — PROGRESS NOTE ADULT - RESPIRATORY DISTRESS OBSERVATION: RESPIRATORY RATE
Less than or equal to 18 breaths

## 2024-09-24 NOTE — PROGRESS NOTE ADULT - PROBLEM SELECTOR PLAN 5
Hemodialysis 02/26/2022    Fluid Removal: 480 ml    Start Time: 14:23  End Time: 17:23    CVC locked with 4% Sodium Citrate. Endorsed to primary RN.    The patient requires nursing assistance with ADLs,  - PPS 10%  - Supportive care.  - Turn and position.  - Continue with good skin care.

## 2024-09-24 NOTE — PROGRESS NOTE ADULT - PROBLEM SELECTOR PLAN 2
likely 2/2 ADHF heart failure  prn use reviewed, as above     - c/w IV dilaudid drip at 0.2mg/hr  - c/w Dilaudid 0.5 mg q1hr PRN for Dyspnea  - c/w O2 via NC as tolerated   - c/w Robinul 0.4mg IV QID

## 2024-09-24 NOTE — PROGRESS NOTE ADULT - ATTENDING SUPERVISION STATEMENT
Resident
Fellow
Resident
Fellow
Resident
Resident

## 2024-09-24 NOTE — PROGRESS NOTE ADULT - PAIN ASSESSMENT ADVANCED DEMENTIA: FACIAL EXPRESSION
Smiling or inexpressive

## 2024-09-24 NOTE — PROGRESS NOTE ADULT - ATTENDING COMMENTS
80 y/o male with hx HFrEF (20%) dobutamine dependent, admitted with cardiogenic shock, deemed not a candidate for advanced heart failure therapies.  Pt with kidney failure as well.  Transferred to PCU on vaso/dobutamine which was discontinued over the weekend.  Patient is hypotensive.        In the setting of parenteral controlled substance administration, clinical monitoring required for side effects such as respiratory depression, constipation and opioid induced neurotoxicity due to organ failure.  Transitioned to continuous infusion with PRNs for better control of symptoms.      Patient with imminent death diagnosis.  Family at bedside, updated as to current clinical condition and plan of care.  Educated as to what to expect, questions answered and emotional support provided.     Patient assessment and plan discussed on interdisciplinary team rounds today.

## 2024-09-24 NOTE — PROGRESS NOTE ADULT - PROBLEM SELECTOR PLAN 4
ischemic HFrEF (EF 20%) on home dobutamine 7.5 p/w ADHF.   s/p PICC placement  AM    - off vaso and   - Continue NC oxygen for comfort  - hold off on removing Shiley due to patient's health acuity

## 2024-09-24 NOTE — PROGRESS NOTE ADULT - PROBLEM SELECTOR PROBLEM 4
Dyspnea
End stage heart failure
End stage heart failure
Dyspnea
End stage heart failure
End stage heart failure
Dyspnea
ACP (advance care planning)

## 2024-09-24 NOTE — PROGRESS NOTE ADULT - PROBLEM SELECTOR PLAN 3
Pt w/ chronic ho anxiety, unable to take PO, noted increasing anxiety/agitation, likely terminal agitation     - c/w IV ativan 1mg q4h ATC (no PRNs required in last 24hr 7am-7am)  - c/w Lorazepam 1.0mg IV q1hour PRN for agitation/anxiety

## 2024-09-24 NOTE — PROGRESS NOTE ADULT - NS ATTEST RISK PROBLEM GEN_ALL_CORE FT
safety/toxicity monitoring of intravenous opiates and/or benzodiazepines in end stage disease process.
safety/toxicity monitoring of intravenous opiates and/or benzodiazepines in end stage disease process.
1. Number and complexity of problems addressed for this patient:    1.1 Moderate (At least 1)  [ ] 1 or more chronic illnesses with exacerbation, progression, or side effects of treatment  [ ] 2 or more stable chronic illnesses  [ ] 1 undiagnosed new problem with uncertain prognosis  [ ] 1 acute illness with systemic symptoms  [ ] 1 acute complicated injury  1.2 High (At least 1)   [x ] 1 or more chronic illnesses with severe exacerbation, progression, or side effects of treatment  [x ] 1 acute or chronic illnesses or injuries that may pose a threat to life or bodily function    2. Amount and/or Complexity of Data that was Reviewed and Analyzed for this case:       Moderate (1 out of 3)       High (2 out of 3)  2.1. (Any combination of 3 of the following)   [ ] Prior External notes were reviewed  [ ] Each test result was reviewed (see "LABS" and "RADIOLOGY & ADDITIONAL STUDIES" above)  [ ] The following tests were ordered and/or reviewed (Only count 1 point for ordering or reviewing a unique test):  	[ ]CBC  	[ ] Chemistry   	[ ] Imaging   	[ ] Other:   [ ] Assessment requiring an independent historian   		Name of historian and relationship:   2.2  [ ] Personally review and interpretation of  image or testing   2.3  [ ] Discussion of management or test interpretation with external physician/other qualified health care professional\appropriate source (not separately reported)    3. Risk of Complications and/or Morbidity or Mortality of for this Patient’s Management:  3.1 Moderate risk of morbidity from additional diagnostic testing or treatment (At least 1):   [ ] Prescription drug management   [ ] Decision regarding minor surgery, treatment, or procedure with identified patient or procedure risk factors  [ ] Decision regarding elective major surgery, treatment, or procedure without identified patient or procedure risk factors   [ ] Diagnosis or treatment significantly limited by social determinants of health   [ ] Other:   3.2 High risk of morbidity from additional diagnostic testing or treatment (At least 1):   [x ] Drug therapy requiring intensive monitoring for toxicity   [ ] Decision regarding elective major surgery, treatment, or procedure with identified patient or procedure risk factors   [ ] Decision regarding emergency major surgery, treatment, or procedure   [ ] Decision regarding hospitalization or escalation of hospital-level of care  [ ] Decision not to resuscitate, not to intubate, or to de-escalate care because of poor prognosis   [ ] Decision to proceed or not with artificial nutrition   [ x] Parenteral controlled substance  [ ] Other:

## 2024-09-24 NOTE — PROGRESS NOTE ADULT - PROBLEM SELECTOR PROBLEM 7
Encounter for palliative care

## 2024-09-24 NOTE — DISCHARGE NOTE FOR THE EXPIRED PATIENT - HOSPITAL COURSE
79-year-old male with a history of HFrEF on home dobutamine 7.5 with last EF less than 20%, CAD, lungs mass - SCC, and urinary retention presented transferred from [facility] for management of heart failure after having syncopal episode, unable to wean off pressors and inotropes.  Patient has since transitioned to comfort care and transferred to PCU. Now off vaso and . The patient  on 24 at 20:20. 79-year-old male with a history of HFrEF on home dobutamine 7.5 with last EF less than 20%, CAD, lungs mass - SCC, and urinary retention presented transferred from [facility] for management of heart failure after having syncopal episode, unable to wean off pressors and inotropes.  Patient has since transitioned to comfort care and transferred to PCU. Now off vaso and . The patient  on 24 at 20:20.    This note and notification is a continuation of the e and m service from earlier today.      Bharat Arellano MD MSEroberta FACP  Available on Teams during regular business hours  Pager after hours [phone]  Division of Geriatrics and Palliative Medicine

## 2024-09-24 NOTE — PROGRESS NOTE ADULT - PAIN ASSESSMENT ADVANCED DEMENTIA: CONSOLABILITY
No need to console

## 2024-09-24 NOTE — PROGRESS NOTE ADULT - PROBLEM SELECTOR PROBLEM 1
End stage heart failure
End stage heart failure
Pain
End stage heart failure
Pain
End stage heart failure
Pain

## 2024-09-24 NOTE — PROGRESS NOTE ADULT - ASSESSMENT
79-year-old male with a history of HFrEF on home dobutamine 7.5 with last EF less than 20%, CAD, lungs mass - SCC, and urinary retention presented transferred from [facility] for management of heart failure after having syncopal episode, unable to wean off pressors and inotropes.  Patient has since transitioned to comfort care and transferred to PCU. Now off vaso and

## 2024-09-24 NOTE — PROGRESS NOTE ADULT - PROBLEM SELECTOR PROBLEM 3
Agitation
Encounter for palliative care
Agitation
End stage heart failure

## 2024-09-24 NOTE — PROGRESS NOTE ADULT - PROBLEM SELECTOR PLAN 1
24hr PRN use reviewed 7AM to 7AM: 0 PRNs required     - c/w IV dilaudid drip at 0.2mg/hr  - IV Dilaudid 0.2 mg q1hr PRN for Moderate Pain   - IV Dilaudid 0.5 mg q1hr PRN for Severe Pain   - Bowel regimen while on opioids.  Monitor for constipation.

## 2024-09-24 NOTE — PROGRESS NOTE ADULT - SUBJECTIVE AND OBJECTIVE BOX
GAP TEAM PALLIATIVE CARE UNIT PROGRESS NOTE:      [  ] Patient on hospice program.    INDICATION FOR PALLIATIVE CARE UNIT SERVICES/Interval HPI: 79-year-old male with a history of HFrEF on home dobutamine 7.5 with last EF less than 20%, CAD, lungs mass - SCC, and urinary retention presented transferred from [facility] for management of heart failure after having syncopal episode last night.  Patient also having reduced urine output despite 6 mg of Bumex twice a day.  At [facility] patient had a workup which was notable for sodium of 124, creatinine to 4.6 from his baseline of approximately 2-2.1, proBNP 5220, lactate 1.9.  He was transferred here for further evaluation and  heart failure management, admitted to CICU. Patient unable to be weaned off vasopressors and not responding to diuretics.  After discussions with wife and patient, patient transitioned to comfort care.     OVERNIGHT EVENTS: This morning BP 67/32 and T 96  Chart reviewed. Patient seen and examined at bedside.     DNR on chart:  DNI  DNI        Allergies    No Known Allergies    Intolerances    atorvastatin (Muscle Pain (Severe))  MEDICATIONS  (STANDING):  chlorhexidine 2% Cloths 1 Application(s) Topical daily  HYDROmorphone Infusion 0.2 mG/Hr (0.2 mL/Hr) IV Continuous <Continuous>  LORazepam   Injectable 1 milliGRAM(s) IV Push every 4 hours  sodium chloride 0.9%. 1000 milliLiter(s) (10 mL/Hr) IV Continuous <Continuous>    MEDICATIONS  (PRN):  acetaminophen  Suppository .. 650 milliGRAM(s) Rectal every 6 hours PRN Temp greater or equal to 38C (100.4F), Mild Pain (1 - 3)  bisacodyl Suppository 10 milliGRAM(s) Rectal daily PRN Constipation  glycopyrrolate Injectable 0.4 milliGRAM(s) IV Push every 6 hours PRN Secretions  HYDROmorphone  Injectable 0.2 milliGRAM(s) IV Push every 1 hour PRN Moderate Pain (4 - 6)  HYDROmorphone  Injectable 0.5 milliGRAM(s) IV Push every 1 hour PRN Severe Pain (7 - 10)  HYDROmorphone  Injectable 0.5 milliGRAM(s) IV Push every 1 hour PRN Dyspnea  lidocaine 2% Gel 1 Application(s) Topical four times a day PRN Discomfort  LORazepam   Injectable 1 milliGRAM(s) IV Push every 1 hour PRN Anxiety/Agitation    ITEMS UNCHECKED ARE NOT PRESENT    PRESENT SYMPTOMS: [ ]Unable to self-report see PAINAD, RDOS below  Source if other than patient:  [ ]Family   [ ]Team     Pain: [ ] yes [ ] no  QOL impact -   Location -                    Aggravating factors -  Quality -  Radiation -  Timing-  Severity (0-10 scale):  Minimal acceptable level (0-10 scale):       PCSSQ [Palliative Care Spiritual Screening Question]   Severity (0-10):  Score of 4 or > indicate consideration of Chaplaincy referral.  Chaplaincy Referral: [ ] yes [ ] refused [ ] following [ ] deferred    Caregiver [place]? : [ ] yes [ ] no [ ] deferred:  Social work referral [ ] Patient & Family Centered Care Referral [ ]   Anticipatory Grief present?:  [ ] yes [ ] no [ ] deferred:  Social work referral [ ] Patient & Family Centered Care Referral [ ]  	  Other Symptoms:  [ ]All other review of systems negative     PHYSICAL EXAM:   Vital Signs Last 24 Hrs  T(C): 35.6 (24 Sep 2024 09:11), Max: 35.6 (24 Sep 2024 09:11)  T(F): 96 (24 Sep 2024 09:11), Max: 96 (24 Sep 2024 09:11)  HR: 70 (24 Sep 2024 09:11) (70 - 70)  BP: 67/32 (24 Sep 2024 09:11) (67/32 - 67/32)  BP(mean): --  RR: 18 (24 Sep 2024 09:11) (18 - 18)  SpO2: 91% (24 Sep 2024 09:11) (91% - 91%)    Parameters below as of 24 Sep 2024 09:11  Patient On (Oxygen Delivery Method): room air     I&O's Summary    23 Sep 2024 07:01  -  24 Sep 2024 07:00  --------------------------------------------------------  IN: 122.4 mL / OUT: 450 mL / NET: -327.6 mL      GENERAL: [ ] Cachexia  [ ]Alert  [ ]Oriented x   [ ]Lethargic  [ ]Unarousable  [ ]Verbal  [ ]Non-Verbal  Behavioral:   [ ] Anxiety  [ ] Delirium [ ] Agitation [ ] Other  HEENT:  [ ]Normal   [ ]Dry mouth   [ ]ET Tube/Trach  [ ]Oral lesions  PULMONARY:   [ ]Clear [ ]Tachypnea  [ ]Audible excessive secretions   [ ]Rhonchi        [ ]Right [ ]Left [ ]Bilateral  [ ]Crackles        [ ]Right [ ]Left [ ]Bilateral  [ ]Wheezing     [ ]Right [ ]Left [ ]Bilateral  [ ]Diminished BS [ ]Right [ ]Left [ ]Bilateral    CARDIOVASCULAR:    [ ]Regular [ ]Irregular [ ]Tachy  [ ]Christiano [ ]Murmur [ ]Other  GASTROINTESTINAL:  [ ]Soft  [ ]Distended   [ ]+BS  [ ]Non tender [ ]Tender  [ ]Other [ ]PEG [ ]OGT/ NGT   Last BM:    GENITOURINARY:  [ ]Normal [ ] Incontinent   [ ]Oliguria/Anuria   [ ]Chandler  MUSCULOSKELETAL:   [ ]Normal   [ ]Weakness  [ ]Bed/Wheelchair bound [ ]Edema  NEUROLOGIC:   [ ]No focal deficits  [ ] Cognitive impairment  [ ] Dysphagia [ ]Dysarthria [ ] Paresis [ ]Other   SKIN:   [ ]Normal  [ ]Rash  [ ]Other  [ ]Pressure ulcer(s)  [ ]y [ ]n  Present on admission      CRITICAL CARE:  [ ] Shock Present  [ ]Septic [ ]Cardiogenic [ ]Neurologic [ ]Hypovolemic  [ ]  Vasopressors [ ]  Inotropes   [ ] Respiratory failure present [ ] Mechanical Ventilation [ ] Non-invasive ventilatory support [ ] High-Flow    [ ] Acute  [ ] Chronic [ ] Hypoxic  [ ] Hypercarbic [ ] Other  [ ] Other organ failure     LABS:            RADIOLOGY & ADDITIONAL STUDIES:    PROTEIN CALORIE MALNUTRITION: [ ] mild [ ] moderate [ ] severe  [ ] underweight [ ] morbid obesity    https://www.andeal.org/vault/2440/web/files/ONC/Table_Clinical%20Characteristics%20to%20Document%20Malnutrition-White%20JV%20et%20al%202012.pdf        [ ] PPSV2 < or = 30% [ ] significant weight loss [ ] poor nutritional intake [ ] anasarca   Artificial Nutrition [ ]     Other REFERRALS:    [ ] Hospice  [ ]Child Life  [ ]Social Work  [ ]Case management [ ]Holistic Therapy [ ] Physical Therapy [ ] Dietary         Palliative Performance Scale:  http://npcrc.org/files/news/palliative_performance_scale_ppsv2.pdf  (Ctrl +  left click to view)  Respiratory Distress Observation Tool:  https://[employer].net/handouts/hen/Respiratory_Distress_Observation_Scale.pdf (Ctrl +  left click to view)  PAINAD Score:  http://geriatrictoolkit.[facility]/cog/painad.pdf (Ctrl +  left click to view)   GAP TEAM PALLIATIVE CARE UNIT PROGRESS NOTE:      [  ] Patient on hospice program.    INDICATION FOR PALLIATIVE CARE UNIT SERVICES/Interval HPI: 79-year-old male with a history of HFrEF on home dobutamine 7.5 with last EF less than 20%, CAD, lungs mass - SCC, and urinary retention presented transferred from [facility] for management of heart failure after having syncopal episode last night.  Patient also having reduced urine output despite 6 mg of Bumex twice a day.  At [facility] patient had a workup which was notable for sodium of 124, creatinine to 4.6 from his baseline of approximately 2-2.1, proBNP 5220, lactate 1.9.  He was transferred here for further evaluation and  heart failure management, admitted to CICU. Patient unable to be weaned off vasopressors and not responding to diuretics.  After discussions with wife and patient, patient transitioned to comfort care.     OVERNIGHT EVENTS: This morning BP 67/32 and T 96. No PRNs required in last 24hrs.   Chart reviewed. Patient seen and examined at bedside.     DNR on chart:  DNI  DNI        Allergies    No Known Allergies    Intolerances    atorvastatin (Muscle Pain (Severe))  MEDICATIONS  (STANDING):  chlorhexidine 2% Cloths 1 Application(s) Topical daily  HYDROmorphone Infusion 0.2 mG/Hr (0.2 mL/Hr) IV Continuous <Continuous>  LORazepam   Injectable 1 milliGRAM(s) IV Push every 4 hours  sodium chloride 0.9%. 1000 milliLiter(s) (10 mL/Hr) IV Continuous <Continuous>    MEDICATIONS  (PRN):  acetaminophen  Suppository .. 650 milliGRAM(s) Rectal every 6 hours PRN Temp greater or equal to 38C (100.4F), Mild Pain (1 - 3)  bisacodyl Suppository 10 milliGRAM(s) Rectal daily PRN Constipation  glycopyrrolate Injectable 0.4 milliGRAM(s) IV Push every 6 hours PRN Secretions  HYDROmorphone  Injectable 0.2 milliGRAM(s) IV Push every 1 hour PRN Moderate Pain (4 - 6)  HYDROmorphone  Injectable 0.5 milliGRAM(s) IV Push every 1 hour PRN Severe Pain (7 - 10)  HYDROmorphone  Injectable 0.5 milliGRAM(s) IV Push every 1 hour PRN Dyspnea  lidocaine 2% Gel 1 Application(s) Topical four times a day PRN Discomfort  LORazepam   Injectable 1 milliGRAM(s) IV Push every 1 hour PRN Anxiety/Agitation    ITEMS UNCHECKED ARE NOT PRESENT    PRESENT SYMPTOMS: [x ]Unable to self-report see PAINAD, RDOS below  Source if other than patient:  [ ]Family   [ x]Team     Pain: [ ] yes [ ] no  QOL impact -   Location -                    Aggravating factors -  Quality -  Radiation -  Timing-  Severity (0-10 scale):  Minimal acceptable level (0-10 scale):       PCSSQ [Palliative Care Spiritual Screening Question]   Severity (0-10):  Score of 4 or > indicate consideration of Chaplaincy referral.  Chaplaincy Referral: [x ] yes [ ] refused [ ] following [ ] deferred - last seen 9/23    Caregiver [place]? : [ ] yes [ ] no [x ] deferred:  Social work referral [ ] Patient & Family Centered Care Referral [ ]   Anticipatory Grief present?:  [x ] yes [ ] no [ ] deferred:  Social work referral [x ] Patient & Family Centered Care Referral [ ]  	  Other Symptoms:  [ ]All other review of systems negative - unable to assess 2/2 lethargy     PHYSICAL EXAM:   Vital Signs Last 24 Hrs  T(C): 35.6 (24 Sep 2024 09:11), Max: 35.6 (24 Sep 2024 09:11)  T(F): 96 (24 Sep 2024 09:11), Max: 96 (24 Sep 2024 09:11)  HR: 70 (24 Sep 2024 09:11) (70 - 70)  BP: 67/32 (24 Sep 2024 09:11) (67/32 - 67/32)  BP(mean): --  RR: 18 (24 Sep 2024 09:11) (18 - 18)  SpO2: 91% (24 Sep 2024 09:11) (91% - 91%)    Parameters below as of 24 Sep 2024 09:11  Patient On (Oxygen Delivery Method): room air     I&O's Summary    23 Sep 2024 07:01  -  24 Sep 2024 07:00  --------------------------------------------------------  IN: 122.4 mL / OUT: 450 mL / NET: -327.6 mL      GENERAL: [x ] Cachexia  [ ]Alert  [ ]Oriented x   [ x]Lethargic  [ ]Unarousable  [ ]Verbal  [ x]Non-Verbal  Behavioral:   [ ] Anxiety  [ ] Delirium [ ] Agitation [ ] Other  HEENT:  [ ]Normal   [x ]Dry mouth   [ ]ET Tube/Trach  [ ]Oral lesions  PULMONARY:   [x ]Clear [ ]Tachypnea  [ ]Audible excessive secretions   [ ]Rhonchi        [ ]Right [ ]Left [ ]Bilateral  [ ]Crackles        [ ]Right [ ]Left [ ]Bilateral  [ ]Wheezing     [ ]Right [ ]Left [ ]Bilateral  [ ]Diminished BS [ ]Right [ ]Left [ ]Bilateral    CARDIOVASCULAR:    [ x]Regular [ ]Irregular [ ]Tachy  [ ]Christiano [ ]Murmur [ ]Other  GASTROINTESTINAL:  [x ]Soft  [ ]Distended   [x ]+BS  [x]Non tender [ ]Tender  [ ]Other [ ]PEG [ ]OGT/ NGT   Last BM:  9/15  GENITOURINARY:  [x ]Normal [ ] Incontinent   [x ]Oliguria/Anuria   [x ]Chandler  MUSCULOSKELETAL:   [ ]Normal   [ x]Weakness  [ x]Bed/Wheelchair bound [ ]Edema  NEUROLOGIC:   [ ]No focal deficits  [x ] Cognitive impairment  [ ] Dysphagia [ ]Dysarthria [ ] Paresis [ ]Other   SKIN:   [ ]Normal  [ ]Rash  [X]Other Please seen RN Documentation which I have reviewed, sacrum, penis   [ ]Pressure ulcer(s)  [ ]y [X]n  Present on admission      CRITICAL CARE:  [ ] Shock Present  [ ]Septic [ ]Cardiogenic [ ]Neurologic [ ]Hypovolemic  [ ]  Vasopressors [ ]  Inotropes   [ ] Respiratory failure present [ ] Mechanical Ventilation [ ] Non-invasive ventilatory support [ ] High-Flow  [ ] Acute  [ ] Chronic [ ] Hypoxic  [ ] Hypercarbic [ ] Other  [ ] Other organ failure kidneys, heart    LABS:            RADIOLOGY & ADDITIONAL STUDIES:    PROTEIN CALORIE MALNUTRITION: [ ] mild [ ] moderate [ ] severe  [ ] underweight [ ] morbid obesity    https://www.andeal.org/vault/2420/web/files/ONC/Table_Clinical%20Characteristics%20to%20Document%20Malnutrition-White%20JV%20et%20al%202012.pdf        [x ] PPSV2 < or = 30% [ ] significant weight loss [ ] poor nutritional intake [ ] anasarca   Artificial Nutrition [ ]     Other REFERRALS:    [ ] Hospice  [ ]Child Life  [ ]Social Work  [ ]Case management [ ]Holistic Therapy [ ] Physical Therapy [ ] Dietary         Palliative Performance Scale:  http://[facility].org/files/news/palliative_performance_scale_ppsv2.pdf  (Ctrl +  left click to view)  Respiratory Distress Observation Tool:  https://homecareinformation.net/handouts/hen/Respiratory_Distress_Observation_Scale.pdf (Ctrl +  left click to view)  PAINAD Score:  http://geriatrictoolkit.[place].[facility]/cog/painad.pdf (Ctrl +  left click to view)   GAP TEAM PALLIATIVE CARE UNIT PROGRESS NOTE:      [  ] Patient on hospice program.    INDICATION FOR PALLIATIVE CARE UNIT SERVICES/Interval HPI: 79-year-old male with a history of HFrEF on home dobutamine 7.5 with last EF less than 20%, CAD, lungs mass - SCC, and urinary retention presented transferred from [facility] for management of heart failure after having syncopal episode last night.  Patient also having reduced urine output despite 6 mg of Bumex twice a day.  At [facility] patient had a workup which was notable for sodium of 124, creatinine to 4.6 from his baseline of approximately 2-2.1, proBNP 5220, lactate 1.9.  He was transferred here for further evaluation and  heart failure management, admitted to CICU. Patient unable to be weaned off vasopressors and not responding to diuretics.  After discussions with wife and patient, patient transitioned to comfort care.     OVERNIGHT EVENTS: This morning BP 67/32 and T 96. No PRNs required in last 24hrs.   Chart reviewed. Patient seen and examined at bedside.     DNR on chart: yes  DNI    Allergies    No Known Allergies    Intolerances    atorvastatin (Muscle Pain (Severe))  MEDICATIONS  (STANDING):  chlorhexidine 2% Cloths 1 Application(s) Topical daily  HYDROmorphone Infusion 0.2 mG/Hr (0.2 mL/Hr) IV Continuous <Continuous>  LORazepam   Injectable 1 milliGRAM(s) IV Push every 4 hours  sodium chloride 0.9%. 1000 milliLiter(s) (10 mL/Hr) IV Continuous <Continuous>    MEDICATIONS  (PRN):  acetaminophen  Suppository .. 650 milliGRAM(s) Rectal every 6 hours PRN Temp greater or equal to 38C (100.4F), Mild Pain (1 - 3)  bisacodyl Suppository 10 milliGRAM(s) Rectal daily PRN Constipation  glycopyrrolate Injectable 0.4 milliGRAM(s) IV Push every 6 hours PRN Secretions  HYDROmorphone  Injectable 0.2 milliGRAM(s) IV Push every 1 hour PRN Moderate Pain (4 - 6)  HYDROmorphone  Injectable 0.5 milliGRAM(s) IV Push every 1 hour PRN Severe Pain (7 - 10)  HYDROmorphone  Injectable 0.5 milliGRAM(s) IV Push every 1 hour PRN Dyspnea  lidocaine 2% Gel 1 Application(s) Topical four times a day PRN Discomfort  LORazepam   Injectable 1 milliGRAM(s) IV Push every 1 hour PRN Anxiety/Agitation    ITEMS UNCHECKED ARE NOT PRESENT    PRESENT SYMPTOMS: [x ]Unable to self-report see PAINAD, RDOS below  Source if other than patient:  [ ]Family   [ x]Team     Pain: [ ] yes [ ] no  QOL impact -   Location -                    Aggravating factors -  Quality -  Radiation -  Timing-  Severity (0-10 scale):  Minimal acceptable level (0-10 scale):       PCSSQ [Palliative Care Spiritual Screening Question]   Severity (0-10):  Score of 4 or > indicate consideration of Chaplaincy referral.  Chaplaincy Referral: [x ] yes [ ] refused [ ] following [ ] deferred - last seen 9/23    Caregiver [place]? : [ ] yes [ ] no [x ] deferred:  Social work referral [ ] Patient & Family Centered Care Referral [ ]   Anticipatory Grief present?:  [x ] yes [ ] no [ ] deferred:  Social work referral [x ] Patient & Family Centered Care Referral [ ]  	  Other Symptoms:  [ ]All other review of systems negative - unable to assess 2/2 lethargy     PHYSICAL EXAM:   Vital Signs Last 24 Hrs  T(C): 35.6 (24 Sep 2024 09:11), Max: 35.6 (24 Sep 2024 09:11)  T(F): 96 (24 Sep 2024 09:11), Max: 96 (24 Sep 2024 09:11)  HR: 70 (24 Sep 2024 09:11) (70 - 70)  BP: 67/32 (24 Sep 2024 09:11) (67/32 - 67/32)  BP(mean): --  RR: 18 (24 Sep 2024 09:11) (18 - 18)  SpO2: 91% (24 Sep 2024 09:11) (91% - 91%)    Parameters below as of 24 Sep 2024 09:11  Patient On (Oxygen Delivery Method): room air     I&O's Summary    23 Sep 2024 07:01  -  24 Sep 2024 07:00  --------------------------------------------------------  IN: 122.4 mL / OUT: 450 mL / NET: -327.6 mL      GENERAL: [x ] Cachexia  [ ]Alert  [ ]Oriented x   [ x]Lethargic  [ ]Unarousable  [ ]Verbal  [ x]Non-Verbal  Behavioral:   [ ] Anxiety  [ ] Delirium [ ] Agitation [ ] Other  HEENT:  [ ]Normal   [x ]Dry mouth   [ ]ET Tube/Trach  [ ]Oral lesions  PULMONARY:   [x ]Clear [ ]Tachypnea  [ ]Audible excessive secretions   [ ]Rhonchi        [ ]Right [ ]Left [ ]Bilateral  [ ]Crackles        [ ]Right [ ]Left [ ]Bilateral  [ ]Wheezing     [ ]Right [ ]Left [ ]Bilateral  [ ]Diminished BS [ ]Right [ ]Left [ ]Bilateral    CARDIOVASCULAR:    [ x]Regular [ ]Irregular [ ]Tachy  [ ]Christiano [ ]Murmur [ ]Other  GASTROINTESTINAL:  [x ]Soft  [ ]Distended   [x ]+BS  [x]Non tender [ ]Tender  [ ]Other [ ]PEG [ ]OGT/ NGT   Last BM:  9/15  GENITOURINARY:  [x ]Normal [ ] Incontinent   [x ]Oliguria/Anuria   [x ]Chandler  MUSCULOSKELETAL:   [ ]Normal   [ x]Weakness  [ x]Bed/Wheelchair bound [ ]Edema  NEUROLOGIC:   [ ]No focal deficits  [x ] Cognitive impairment  [ ] Dysphagia [ ]Dysarthria [ ] Paresis [ ]Other   SKIN:   [ ]Normal  [ ]Rash  [X]Other Please seen RN Documentation which I have reviewed, sacrum, penis   [ ]Pressure ulcer(s)  [ ]y [X]n  Present on admission      CRITICAL CARE:  [ ] Shock Present  [ ]Septic [ ]Cardiogenic [ ]Neurologic [ ]Hypovolemic  [ ]  Vasopressors [ ]  Inotropes   [ ] Respiratory failure present [ ] Mechanical Ventilation [ ] Non-invasive ventilatory support [ ] High-Flow  [ ] Acute  [ ] Chronic [ ] Hypoxic  [ ] Hypercarbic [ ] Other  [ ] Other organ failure kidneys, heart    LABS: None new.             RADIOLOGY & ADDITIONAL STUDIES: None new.     PROTEIN CALORIE MALNUTRITION: [ ] mild [ ] moderate [ ] severe  [ ] underweight [ ] morbid obesity    https://www.andeal.org/vault/4940/web/files/ONC/Table_Clinical%20Characteristics%20to%20Document%20Malnutrition-White%20JV%20et%20al%264013.pdf        [x ] PPSV2 < or = 30% [ ] significant weight loss [ ] poor nutritional intake [ ] anasarca   Artificial Nutrition [ ]     Other REFERRALS:    [ ] Hospice  [ ]Child Life  [ ]Social Work  [ ]Case management [ ]Holistic Therapy [ ] Physical Therapy [ ] Dietary         Palliative Performance Scale:  http://[facility].org/files/news/palliative_performance_scale_ppsv2.pdf  (Ctrl +  left click to view)  Respiratory Distress Observation Tool:  https://homecareinformation.net/handouts/hen/Respiratory_Distress_Observation_Scale.pdf (Ctrl +  left click to view)  PAINAD Score:  http://geriatrictoolkit.[place].[facility]/cog/painad.pdf (Ctrl +  left click to view)

## 2024-09-27 ENCOUNTER — APPOINTMENT (OUTPATIENT)
Dept: CT IMAGING | Facility: HOSPITAL | Age: 79
End: 2024-09-27

## 2024-09-29 PROCEDURE — 86850 RBC ANTIBODY SCREEN: CPT

## 2024-09-29 PROCEDURE — 96375 TX/PRO/DX INJ NEW DRUG ADDON: CPT

## 2024-09-29 PROCEDURE — C1751: CPT

## 2024-09-29 PROCEDURE — 83880 ASSAY OF NATRIURETIC PEPTIDE: CPT

## 2024-09-29 PROCEDURE — 36415 COLL VENOUS BLD VENIPUNCTURE: CPT

## 2024-09-29 PROCEDURE — 87641 MR-STAPH DNA AMP PROBE: CPT

## 2024-09-29 PROCEDURE — 84466 ASSAY OF TRANSFERRIN: CPT

## 2024-09-29 PROCEDURE — 87640 STAPH A DNA AMP PROBE: CPT

## 2024-09-29 PROCEDURE — 82803 BLOOD GASES ANY COMBINATION: CPT

## 2024-09-29 PROCEDURE — 84443 ASSAY THYROID STIM HORMONE: CPT

## 2024-09-29 PROCEDURE — 86900 BLOOD TYPING SEROLOGIC ABO: CPT

## 2024-09-29 PROCEDURE — 71045 X-RAY EXAM CHEST 1 VIEW: CPT

## 2024-09-29 PROCEDURE — 85730 THROMBOPLASTIN TIME PARTIAL: CPT

## 2024-09-29 PROCEDURE — 84484 ASSAY OF TROPONIN QUANT: CPT

## 2024-09-29 PROCEDURE — 80048 BASIC METABOLIC PNL TOTAL CA: CPT

## 2024-09-29 PROCEDURE — 85018 HEMOGLOBIN: CPT

## 2024-09-29 PROCEDURE — 83550 IRON BINDING TEST: CPT

## 2024-09-29 PROCEDURE — 84295 ASSAY OF SERUM SODIUM: CPT

## 2024-09-29 PROCEDURE — 36569 INSJ PICC 5 YR+ W/O IMAGING: CPT

## 2024-09-29 PROCEDURE — 82435 ASSAY OF BLOOD CHLORIDE: CPT

## 2024-09-29 PROCEDURE — 93005 ELECTROCARDIOGRAM TRACING: CPT

## 2024-09-29 PROCEDURE — 83605 ASSAY OF LACTIC ACID: CPT

## 2024-09-29 PROCEDURE — C1894: CPT

## 2024-09-29 PROCEDURE — C8929: CPT

## 2024-09-29 PROCEDURE — 85027 COMPLETE CBC AUTOMATED: CPT

## 2024-09-29 PROCEDURE — 80053 COMPREHEN METABOLIC PANEL: CPT

## 2024-09-29 PROCEDURE — 84550 ASSAY OF BLOOD/URIC ACID: CPT

## 2024-09-29 PROCEDURE — 76775 US EXAM ABDO BACK WALL LIM: CPT

## 2024-09-29 PROCEDURE — 85025 COMPLETE CBC W/AUTO DIFF WBC: CPT

## 2024-09-29 PROCEDURE — 99285 EMERGENCY DEPT VISIT HI MDM: CPT

## 2024-09-29 PROCEDURE — 82140 ASSAY OF AMMONIA: CPT

## 2024-09-29 PROCEDURE — 83735 ASSAY OF MAGNESIUM: CPT

## 2024-09-29 PROCEDURE — P9016: CPT

## 2024-09-29 PROCEDURE — 36430 TRANSFUSION BLD/BLD COMPNT: CPT

## 2024-09-29 PROCEDURE — 84100 ASSAY OF PHOSPHORUS: CPT

## 2024-09-29 PROCEDURE — 70450 CT HEAD/BRAIN W/O DYE: CPT | Mod: MC

## 2024-09-29 PROCEDURE — 86923 COMPATIBILITY TEST ELECTRIC: CPT

## 2024-09-29 PROCEDURE — 80202 ASSAY OF VANCOMYCIN: CPT

## 2024-09-29 PROCEDURE — 83036 HEMOGLOBIN GLYCOSYLATED A1C: CPT

## 2024-09-29 PROCEDURE — 96374 THER/PROPH/DIAG INJ IV PUSH: CPT

## 2024-09-29 PROCEDURE — 81001 URINALYSIS AUTO W/SCOPE: CPT

## 2024-09-29 PROCEDURE — 82330 ASSAY OF CALCIUM: CPT

## 2024-09-29 PROCEDURE — 83540 ASSAY OF IRON: CPT

## 2024-09-29 PROCEDURE — 85520 HEPARIN ASSAY: CPT

## 2024-09-29 PROCEDURE — 87040 BLOOD CULTURE FOR BACTERIA: CPT

## 2024-09-29 PROCEDURE — 80307 DRUG TEST PRSMV CHEM ANLYZR: CPT

## 2024-09-29 PROCEDURE — 85014 HEMATOCRIT: CPT

## 2024-09-29 PROCEDURE — 94660 CPAP INITIATION&MGMT: CPT

## 2024-09-29 PROCEDURE — 82947 ASSAY GLUCOSE BLOOD QUANT: CPT

## 2024-09-29 PROCEDURE — 84132 ASSAY OF SERUM POTASSIUM: CPT

## 2024-09-29 PROCEDURE — 85610 PROTHROMBIN TIME: CPT

## 2024-09-29 PROCEDURE — 80061 LIPID PANEL: CPT

## 2024-09-29 PROCEDURE — 82962 GLUCOSE BLOOD TEST: CPT

## 2024-09-29 PROCEDURE — 86901 BLOOD TYPING SEROLOGIC RH(D): CPT

## 2024-09-29 PROCEDURE — 82728 ASSAY OF FERRITIN: CPT

## 2024-09-29 PROCEDURE — 93306 TTE W/DOPPLER COMPLETE: CPT

## 2024-10-08 NOTE — PROGRESS NOTE ADULT - PROBLEM SELECTOR PLAN 1
Davida from [facility] called to follow-up on this.   Volume  status is improving, and patient feeling a little better today  -will work with  to get patient home with home care and IV dobutamine  -Continue Dobutamine 7.5mcg/kg/min.  -Weight today is 79.6 kg  -continue bumex 6mg po bid  -continue spironalactone 25 qd  -he has chronically low potassium and does have PVCs  -would dc home on potassium 20meq bid  -Keep K 4.0-5.0. and Mag >2.0   -Strict I/O.   -Patient is DNR/DNI.   **OF NOTE**Patient unable to get dobutamine on hospice. Therefore he will go home with prior home infusion service.   D/c planning.

## 2024-10-14 NOTE — CONSULT NOTE ADULT - CONSULT REASON
Body Location Override (Optional - Billing Will Still Be Based On Selected Body Map Location If Applicable): viktoriya umbilical skin Detail Level: Simple Add 27199 Cpt? (Important Note: In 2017 The Use Of 77887 Is Being Tracked By Cms To Determine Future Global Period Reimbursement For Global Periods): no CHF Suture Removal Completed By (Optional): AB

## 2024-10-24 ENCOUNTER — APPOINTMENT (OUTPATIENT)
Dept: HEART FAILURE | Facility: CLINIC | Age: 79
End: 2024-10-24

## 2024-12-18 NOTE — DIETITIAN INITIAL EVALUATION ADULT - ENERGY INTAKE
Diagnosis: Anemia [277749]   Future Attending Provider: MAVIS RIVAS [[id number]]   Is the patient being sent to ED Observation?: No   Fair (50-75%)

## 2024-12-25 PROBLEM — F10.90 ALCOHOL USE: Status: ACTIVE | Noted: 2017-01-12

## 2024-12-27 NOTE — ED ADULT TRIAGE NOTE - GLASGOW COMA SCALE: BEST MOTOR RESPONSE, MLM
Plan: now has PIH patricia cheeks\\n\\nAM routine: Wash face with Cerave acne foaming cream cleanser in the morning. Informed pt of bleaching effects on clothing and towels. Apply clindamyacin benzoyl peroxide gel and follow with moisturizer and sunscreen at least SPF 30 or higher.\\n\\nPM routine: Wash face with CeraVe salicylic acid cleanser. increase to Tazorac cream .1% at bedtime every other night, increase to every night as tolerated. Ok to use Aquaphor as needed for dry skin.\\n\\nContinue Loestrin Fe 1/20 (28-Day) 1 mg-20 mcg (21)/75 mg (7) tablet FOLLOW PACKAGE INSTRUCTIONS
Detail Level: Zone
Render In Strict Bullet Format?: No
(M6) obeys commands
Defer Treatment (Provide Reason For Deferment In Text Field Below): Glycopyrrolate tablets as needed to reduce sweating

## 2025-01-08 NOTE — DIETITIAN INITIAL EVALUATION ADULT - MALNUTRITION
[facility]  Discharge Note  Short Stay    Procedure(s) (LRB):  EXTRACTION, CATARACT, WITH IOL INSERTION (Left)  BRIEF DISCHARGE NOTE:    Date of discharge: 01/08/2025    Reason for hospitalization -  Cataract surgery     Final Diagnosis - Visually significant Cataract    Procedures and treatment provided - Status post phacoemulsification with placement of intraocular lens     Diet - Advance to regular as tolerated    Activity - as tolerated    Disposition at the end of the case - Good.    Discharge: to home    The patient tolerated the procedure well and knows to follow up with me tomorrow in the eye clinic, sooner if needed.    Patient and family instructions (as appropriate) - Given to patient on discharge    Elidia Figueroa MD     Severe

## 2025-01-14 ENCOUNTER — NON-APPOINTMENT (OUTPATIENT)
Age: 80
End: 2025-01-14

## 2025-01-16 ENCOUNTER — NON-APPOINTMENT (OUTPATIENT)
Age: 80
End: 2025-01-16

## 2025-01-24 ENCOUNTER — NON-APPOINTMENT (OUTPATIENT)
Age: 80
End: 2025-01-24

## 2025-03-04 NOTE — BRIEF OPERATIVE NOTE - PROCEDURE
<<-----Click on this checkbox to enter Procedure Lipoma resection  03/20/2018    Active  BKECK  Umbilical hernia repair  03/20/2018    Active  BKECK  Laparoscopic inguinal hernia repair  03/20/2018  right  Active  BKECK unk

## 2025-03-13 NOTE — PROGRESS NOTE ADULT - SUBJECTIVE AND OBJECTIVE BOX
Name: Chucho Montenegro      : 1962      MRN: [id number]  Encounter Provider: Danielle Zambrano MD  Encounter Date: 3/13/2025   Encounter department: [facility]    Assessment & Plan  LLQ pain  Very concerned about patient having severe left lower quadrant abdominal pain.  Diverticuli versus kidney stone versus acute gastroenteritis  Having normal bowel movements just having a low-grade fever with left lower quadrant abdominal pain  Never has had a colonoscopy before  Previous CT scans did not demonstrate any diverticuli  Orders:  •  Transfer to other facility    Fever in other diseases    Orders:  •  Transfer to other facility         History of Present Illness     Abdominal Pain  Associated symptoms include a fever. Pertinent negatives include no constipation, diarrhea, nausea or vomiting.   .    62-year-old male starting with severe 10 out of 10 left lower quadrant abdominal pain not related with any diarrhea or constipation.  States he had a normal bowel movement this morning.  Has started with low-grade fevers and chills.    Review of Systems   Constitutional:  Positive for fever. Negative for activity change, appetite change, chills and fatigue.   HENT:  Negative for congestion.    Respiratory:  Negative for cough, chest tightness and shortness of breath.    Cardiovascular:  Negative for chest pain and leg swelling.   Gastrointestinal:  Positive for abdominal pain. Negative for abdominal distention, constipation, diarrhea, nausea and vomiting.   All other systems reviewed and are negative.    History reviewed. No pertinent past medical history.  Past Surgical History:   Procedure Laterality Date   • HIP SURGERY Right      History reviewed. No pertinent family history.  Social History     Tobacco Use   • Smoking status: Never   • Smokeless tobacco: Never   Vaping Use   • Vaping status: Never Used   Substance and Sexual Activity   • Alcohol use: Never   • Drug use: Not on file   •  Sexual activity: Not on file     No current outpatient medications on file prior to visit.     No Known Allergies  Immunization History   Administered Date(s) Administered   • COVID-19, unspecified 06/02/2021   • Tdap 08/11/2015, 08/04/2021     Objective   /78 (BP Location: Left arm, Patient Position: Sitting, Cuff Size: Adult)   Pulse (!) 106   Temp 100.2 °F (37.9 °C) (Tympanic)   Resp 14   Ht 6' (1.829 m)   Wt 113 kg (250 lb)   SpO2 98%   BMI 33.91 kg/m²     Physical Exam  Vitals reviewed.   Constitutional:       Appearance: He is well-developed.   HENT:      Head: Normocephalic and atraumatic.      Right Ear: Tympanic membrane, ear canal and external ear normal.      Left Ear: Tympanic membrane, ear canal and external ear normal.      Nose: Nose normal.      Mouth/Throat:      Mouth: Mucous membranes are moist.   Eyes:      Conjunctiva/sclera: Conjunctivae normal.      Pupils: Pupils are equal, round, and reactive to light.   Cardiovascular:      Rate and Rhythm: Normal rate and regular rhythm.      Heart sounds: Normal heart sounds.   Pulmonary:      Effort: Pulmonary effort is normal.      Breath sounds: Normal breath sounds. No wheezing.   Abdominal:      General: Bowel sounds are normal. There is no distension.      Palpations: Abdomen is soft. There is no mass.      Tenderness: There is abdominal tenderness (10/10 pain unable to touch) in the left lower quadrant.   Genitourinary:     Penis: Normal.    Musculoskeletal:         General: No tenderness. Normal range of motion.      Cervical back: Normal range of motion and neck supple.   Skin:     General: Skin is warm.      Capillary Refill: Capillary refill takes less than 2 seconds.   Neurological:      Mental Status: He is alert and oriented to person, place, and time.      Cranial Nerves: No cranial nerve deficit.      Sensory: No sensory deficit.      Motor: No abnormal muscle tone.      Coordination: Coordination normal.      Deep Tendon  Reflexes: Reflexes normal.   Psychiatric:         Behavior: Behavior normal.         Thought Content: Thought content normal.         Judgment: Judgment normal.          %%%%INCOMPLETE NOTE%%%%%     Dr. Bradley Herbert PGY2    ELYSE MALAVE  77y  MRN: [id number]    Subjective:    Patient is a 77y old  Male who presents with a chief complaint of HF exercerbation (20 May 2022 00:45)      MEDICATIONS  (STANDING):  aspirin enteric coated 81 milliGRAM(s) Oral daily  clopidogrel Tablet 75 milliGRAM(s) Oral daily  furosemide   Injectable 20 milliGRAM(s) IV Push daily  heparin   Injectable 5000 Unit(s) SubCutaneous every 8 hours  metoprolol tartrate 25 milliGRAM(s) Oral two times a day  mirtazapine 15 milliGRAM(s) Oral at bedtime  tamsulosin 0.4 milliGRAM(s) Oral at bedtime  venlafaxine XR. 150 milliGRAM(s) Oral every 24 hours    MEDICATIONS  (PRN):  acetaminophen     Tablet .. 650 milliGRAM(s) Oral every 6 hours PRN Temp greater or equal to 38C (100.4F), Mild Pain (1 - 3)  clonazePAM  Tablet 0.5 milliGRAM(s) Oral every 12 hours PRN anxiety        Objective:    Vitals: Vital Signs Last 24 Hrs  T(C): 36.3 (05-20-22 @ 04:39), Max: 36.6 (05-19-22 @ 18:00)  T(F): 97.4 (05-20-22 @ 04:39), Max: 97.9 (05-19-22 @ 18:00)  HR: 64 (05-20-22 @ 06:11) (64 - 78)  BP: 98/62 (05-20-22 @ 06:11) (84/61 - 106/70)  BP(mean): 71 (05-19-22 @ 21:00) (71 - 78)  RR: 17 (05-20-22 @ 04:39) (16 - 20)  SpO2: 95% (05-20-22 @ 04:39) (95% - 99%)            I&O's Summary      PHYSICAL EXAM:  GENERAL: NAD, well-groomed, well-developed  HEAD:  Atraumatic, Normocephalic  EYES: EOMI, PERRLA, conjunctiva and sclera clear  ENMT: No tonsillar erythema, exudates, or enlargement; Moist mucous membranes, Good dentition, No lesions  NECK: Supple, No JVD, Normal thyroid  CHEST/LUNG: Clear to auscultation bilaterally; No rales, rhonchi, wheezing, or rubs  HEART: Regular rate and rhythm; No murmurs, rubs, or gallops  ABDOMEN: Soft, Nontender, Nondistended; Bowel sounds present  EXTREMITIES:  2+ Peripheral Pulses, No clubbing, cyanosis, or edema  LYMPH: No lymphadenopathy noted  SKIN: No rashes or lesions  NERVOUS SYSTEM:  Alert & Oriented X4, Good concentration  PSYCH: Normal Affect. Speaking in Full Sentences. Laying in bed comfortably; not agitated     LABS:                        13.0   9.33  )-----------( 304      ( 19 May 2022 18:17 )             40.8     Hgb Trend: 13.0<--, 11.9<--, 12.2<--, 11.7<--  05-19    134<L>  |  102  |  36<H>  ----------------------------<  134<H>  5.1   |  18<L>  |  1.35<H>    Ca    9.1      19 May 2022 18:17  Mg     2.3     05-19    TPro  6.8  /  Alb  3.5  /  TBili  0.9  /  DBili  x   /  AST  60<H>  /  ALT  79<H>  /  AlkPhos  284<H>  05-19    Creatinine Trend: 1.35<--, 1.14<--, 1.04<--, 0.95<--, 0.87<--, 1.08<--                    CAPILLARY BLOOD GLUCOSE         Dr. Bradley Herbert PGY2    ELYSE MALAVE  77y  MRN: [id number]    Subjective:    Patient is a 77y old  Male who presents with a chief complaint of HF exercerbation (20 May 2022 00:45)  Spoke with Patient at Bedside. No acute events overnight. Patient still complains of shortness of breath and nausea that is unchanged from when he was discharged.     MEDICATIONS  (STANDING):  aspirin enteric coated 81 milliGRAM(s) Oral daily  clopidogrel Tablet 75 milliGRAM(s) Oral daily  furosemide   Injectable 20 milliGRAM(s) IV Push daily  heparin   Injectable 5000 Unit(s) SubCutaneous every 8 hours  metoprolol tartrate 25 milliGRAM(s) Oral two times a day  mirtazapine 15 milliGRAM(s) Oral at bedtime  tamsulosin 0.4 milliGRAM(s) Oral at bedtime  venlafaxine XR. 150 milliGRAM(s) Oral every 24 hours    MEDICATIONS  (PRN):  acetaminophen     Tablet .. 650 milliGRAM(s) Oral every 6 hours PRN Temp greater or equal to 38C (100.4F), Mild Pain (1 - 3)  clonazePAM  Tablet 0.5 milliGRAM(s) Oral every 12 hours PRN anxiety        Objective:    Vitals: Vital Signs Last 24 Hrs  T(C): 36.3 (05-20-22 @ 04:39), Max: 36.6 (05-19-22 @ 18:00)  T(F): 97.4 (05-20-22 @ 04:39), Max: 97.9 (05-19-22 @ 18:00)  HR: 64 (05-20-22 @ 06:11) (64 - 78)  BP: 98/62 (05-20-22 @ 06:11) (84/61 - 106/70)  BP(mean): 71 (05-19-22 @ 21:00) (71 - 78)  RR: 17 (05-20-22 @ 04:39) (16 - 20)  SpO2: 95% (05-20-22 @ 04:39) (95% - 99%)            I&O's Summary      PHYSICAL EXAM:  GENERAL: NAD, lying in bed comfortably  HEAD:  Atraumatic, Normocephalic  EYES: conjunctiva and sclera clear  CHEST/LUNG: on RA, mild crackles at bases bilaterally   HEART: Regular rate and rhythm; No murmurs, rubs, or gallops  ABDOMEN: Bowel sounds present; Soft, Nontender, Nondistended.   EXTREMITIES:  trace- 1+ pitting edema at LE b/l   NERVOUS SYSTEM:  Alert & Oriented X3, speech clear. No deficits   MSK: FROM all 4 extremities, full and equal strength  SKIN: No rashes or lesions    LABS:                        13.0   9.33  )-----------( 304      ( 19 May 2022 18:17 )             40.8     Hgb Trend: 13.0<--, 11.9<--, 12.2<--, 11.7<--  05-19    134<L>  |  102  |  36<H>  ----------------------------<  134<H>  5.1   |  18<L>  |  1.35<H>    Ca    9.1      19 May 2022 18:17  Mg     2.3     05-19    TPro  6.8  /  Alb  3.5  /  TBili  0.9  /  DBili  x   /  AST  60<H>  /  ALT  79<H>  /  AlkPhos  284<H>  05-19    Creatinine Trend: 1.35<--, 1.14<--, 1.04<--, 0.95<--, 0.87<--, 1.08<--                    CAPILLARY BLOOD GLUCOSE

## 2025-04-14 NOTE — ED ADULT NURSE NOTE - CADM POA CENTRAL LINE
April 14, 2025     Patient: Juve Gallagher  YOB: 1978  Date of Visit: 4/14/2025      To Whom it May Concern:    Juve Gallagher is under my professional care. Juve was seen in my office on 4/14/2025. Juve may return to school on 4/15/2025 .    If you have any questions or concerns, please don't hesitate to call.         Sincerely,          Vicky Deal DPM        CC: No Recipients   No

## 2025-05-08 ENCOUNTER — NON-APPOINTMENT (OUTPATIENT)
Age: 80
End: 2025-05-08

## 2025-06-05 NOTE — PROGRESS NOTE ADULT - PARTICIPANTS
Patient draining hazy light anne colored pleural fluid from left lower backside. Specimen collected for lab analysis.
Patient/Family/Staff
Patient/Family/Staff

## 2025-06-17 NOTE — PROGRESS NOTE ADULT - CRITICAL CARE SERVICES PROVIDED
Patient is critically ill, requiring critical care services.
[place]
Patient is critically ill, requiring critical care services.
Patient is critically ill, requiring critical care services.

## 2025-06-18 NOTE — BH CONSULTATION LIAISON ASSESSMENT NOTE - NSICDXPASTMEDICALHX_GEN_ALL_CORE_FT
Conjuntivae and eyelids appear normal,  Sclerae : White without injection
PAST MEDICAL HISTORY:  Anxiety     Bipolar disorder     BPH (benign prostatic hyperplasia)     CAD (coronary artery disease)     Constipation     Depression     Depression     History of CHF (congestive heart failure)     History of inguinal hernia     Inguinal hernia of right side without obstruction or gangrene     Lung mass     LV (left ventricular) mural thrombus     Other nonspecific abnormal finding of lung field     SCC (squamous cell carcinoma)     Severe left ventricular systolic dysfunction     Umbilical hernia, incarcerated     Urinary retention

## 2025-07-11 NOTE — H&P PST ADULT - FUNCTIONAL ASSESSMENT - BASIC MOBILITY ASSESSMENT TYPE
Message received from Vascular Neurology Team informing this CM that the P2P has been completed and [facility] has approved Rehab services. Will continue to follow.    11:10AM  Message received from [facility] informing this CM that they have received insurance authorization and they are ready for the patient. Ambulance transportation arranged for an estimated pick-up time of 12:30PM and the nurse can call report to [phone].     Call placed to patient's spouse Cedricrinshelley ([phone]) and she was updated on the above conversation.     Gloria Robertson RN  Ext [phone]    Admission

## (undated) DEVICE — DRSG TELFA 3 X 8

## (undated) DEVICE — GLV 7.5 PROTEXIS (WHITE)

## (undated) DEVICE — SOL ANTI FOG

## (undated) DEVICE — ENDOCATCH GENERAL 15MM (PURPLE)

## (undated) DEVICE — SUT VICRYL 3-0 27" SH UNDYED

## (undated) DEVICE — SYR SLIP 10CC

## (undated) DEVICE — FOLEY TRAY 16FR 5CC LF UMETER CLOSED

## (undated) DEVICE — DRSG BENZOIN 0.6CC

## (undated) DEVICE — ELCTR BOVIE TIP BLADE INSULATED 6.5" EDGE

## (undated) DEVICE — DRSG STERISTRIPS 0.5 X 4"

## (undated) DEVICE — SUT SILK 2-0 24" TIES

## (undated) DEVICE — CHEST DRAIN OASIS DRY SUCTION WATER SEAL

## (undated) DEVICE — TUBING SUCTION NONCONDUCTIVE 6MM X 12FT

## (undated) DEVICE — PACK MAJOR ABDOMINAL W ENDO DRAPE

## (undated) DEVICE — PREP CHLORAPREP HI-LITE ORANGE 26ML

## (undated) DEVICE — DRAPE IOBAN 33" X 23"

## (undated) DEVICE — Device

## (undated) DEVICE — STAPLER ECHELON FLEX POWERED ADV PLCMT TIP

## (undated) DEVICE — SUT VICRYL 2-0 27" UR-6

## (undated) DEVICE — SUT PROLENE 0 30" CT-1

## (undated) DEVICE — STAPLER SKIN VISI-STAT 35 WIDE

## (undated) DEVICE — HAND-AID ARTERIAL WRIST SUPPORT

## (undated) DEVICE — DISSECTOR ENDOSCOPIC KITTNER SINGLE TIP

## (undated) DEVICE — DRAPE LARGE SHEET 72X85"

## (undated) DEVICE — GLV 7 PROTEXIS (WHITE)

## (undated) DEVICE — DRAPE MAGNETIC INSTRUMENT MEDIUM

## (undated) DEVICE — POSITIONER STRAP ARMBOARD VELCRO TS-30

## (undated) DEVICE — DRSG TEGADERM 4X4.75"

## (undated) DEVICE — DISSECTOR ENDO PEANUT 5MM

## (undated) DEVICE — ELCTR GROUNDING PAD ADULT COVIDIEN

## (undated) DEVICE — ELCTR BOVIE TIP BLADE INSULATED 2.75" EDGE

## (undated) DEVICE — SUT MONOCRYL 4-0 27" PS-2 UNDYED

## (undated) DEVICE — WARMING BLANKET LOWER ADULT

## (undated) DEVICE — VISITEC 4X4

## (undated) DEVICE — CONNECTOR REDUCING STRAIGHT 3/8X0.25"

## (undated) DEVICE — ELCTR EXTENSION STRAIGHT

## (undated) DEVICE — TAPE SILK 3"

## (undated) DEVICE — STAPLER ECHELON FLEX POWERED PLUS 340MM

## (undated) DEVICE — SUT VICRYL 1 27" CTX

## (undated) DEVICE — SUT VICRYL 0 27" CT-1 UNDYED

## (undated) DEVICE — VENODYNE/SCD SLEEVE CALF MEDIUM

## (undated) DEVICE — SUT SILK 2-0 30" TIES

## (undated) DEVICE — SUT SILK 0 18" TIES